# Patient Record
Sex: FEMALE | Race: WHITE | Employment: OTHER | URBAN - METROPOLITAN AREA
[De-identification: names, ages, dates, MRNs, and addresses within clinical notes are randomized per-mention and may not be internally consistent; named-entity substitution may affect disease eponyms.]

---

## 2018-08-20 ENCOUNTER — OP HISTORICAL/CONVERTED ENCOUNTER (OUTPATIENT)
Dept: OTHER | Age: 68
End: 2018-08-20

## 2018-09-04 ENCOUNTER — OP HISTORICAL/CONVERTED ENCOUNTER (OUTPATIENT)
Dept: OTHER | Age: 68
End: 2018-09-04

## 2018-10-02 ENCOUNTER — OP HISTORICAL/CONVERTED ENCOUNTER (OUTPATIENT)
Dept: OTHER | Age: 68
End: 2018-10-02

## 2018-11-01 ENCOUNTER — OP HISTORICAL/CONVERTED ENCOUNTER (OUTPATIENT)
Dept: OTHER | Age: 68
End: 2018-11-01

## 2018-12-04 ENCOUNTER — OP HISTORICAL/CONVERTED ENCOUNTER (OUTPATIENT)
Dept: OTHER | Age: 68
End: 2018-12-04

## 2019-01-03 ENCOUNTER — OP HISTORICAL/CONVERTED ENCOUNTER (OUTPATIENT)
Dept: OTHER | Age: 69
End: 2019-01-03

## 2019-02-05 ENCOUNTER — OP HISTORICAL/CONVERTED ENCOUNTER (OUTPATIENT)
Dept: OTHER | Age: 69
End: 2019-02-05

## 2019-03-01 ENCOUNTER — OP HISTORICAL/CONVERTED ENCOUNTER (OUTPATIENT)
Dept: OTHER | Age: 69
End: 2019-03-01

## 2019-05-20 ENCOUNTER — OP HISTORICAL/CONVERTED ENCOUNTER (OUTPATIENT)
Dept: OTHER | Age: 69
End: 2019-05-20

## 2019-05-27 ENCOUNTER — OP HISTORICAL/CONVERTED ENCOUNTER (OUTPATIENT)
Dept: OTHER | Age: 69
End: 2019-05-27

## 2020-01-01 ENCOUNTER — TELEPHONE (OUTPATIENT)
Dept: CARDIOLOGY CLINIC | Age: 70
End: 2020-01-01

## 2020-01-01 ENCOUNTER — TELEPHONE ANTICOAG (OUTPATIENT)
Dept: CARDIOLOGY CLINIC | Age: 70
End: 2020-01-01

## 2020-01-01 ENCOUNTER — HOSPITAL ENCOUNTER (INPATIENT)
Age: 70
LOS: 11 days | Discharge: HOME HEALTH | DRG: 673 | End: 2020-11-20
Attending: EMERGENCY MEDICINE | Admitting: INTERNAL MEDICINE
Payer: MEDICARE

## 2020-01-01 ENCOUNTER — HOSPITAL ENCOUNTER (OUTPATIENT)
Dept: INFUSION THERAPY | Age: 70
Discharge: HOME | End: 2020-11-03

## 2020-01-01 ENCOUNTER — OFFICE VISIT (OUTPATIENT)
Dept: CARDIOLOGY CLINIC | Age: 70
End: 2020-01-01
Payer: MEDICARE

## 2020-01-01 ENCOUNTER — HOSPITAL ENCOUNTER (OUTPATIENT)
Dept: INFUSION THERAPY | Age: 70
Discharge: HOME | End: 2020-10-27
Payer: MEDICARE

## 2020-01-01 ENCOUNTER — APPOINTMENT (OUTPATIENT)
Dept: GENERAL RADIOLOGY | Age: 70
DRG: 287 | End: 2020-01-01
Attending: NURSE PRACTITIONER
Payer: MEDICARE

## 2020-01-01 ENCOUNTER — APPOINTMENT (OUTPATIENT)
Dept: NON INVASIVE DIAGNOSTICS | Age: 70
DRG: 287 | End: 2020-01-01
Attending: NURSE PRACTITIONER
Payer: MEDICARE

## 2020-01-01 ENCOUNTER — APPOINTMENT (OUTPATIENT)
Dept: NUCLEAR MEDICINE | Age: 70
DRG: 287 | End: 2020-01-01
Attending: NURSE PRACTITIONER
Payer: MEDICARE

## 2020-01-01 ENCOUNTER — HOSPITAL ENCOUNTER (OUTPATIENT)
Dept: CT IMAGING | Age: 70
Discharge: HOME | DRG: 287 | End: 2020-10-27
Attending: NURSE PRACTITIONER
Payer: MEDICARE

## 2020-01-01 ENCOUNTER — TELEPHONE (OUTPATIENT)
Dept: PALLATIVE CARE | Age: 70
End: 2020-01-01

## 2020-01-01 ENCOUNTER — APPOINTMENT (OUTPATIENT)
Dept: GENERAL RADIOLOGY | Age: 70
End: 2020-01-01
Attending: EMERGENCY MEDICINE
Payer: MEDICARE

## 2020-01-01 ENCOUNTER — APPOINTMENT (OUTPATIENT)
Dept: GENERAL RADIOLOGY | Age: 70
DRG: 673 | End: 2020-01-01
Attending: EMERGENCY MEDICINE
Payer: MEDICARE

## 2020-01-01 ENCOUNTER — APPOINTMENT (OUTPATIENT)
Dept: CT IMAGING | Age: 70
DRG: 673 | End: 2020-01-01
Attending: NURSE PRACTITIONER
Payer: MEDICARE

## 2020-01-01 ENCOUNTER — HOSPITAL ENCOUNTER (EMERGENCY)
Age: 70
Discharge: HOME | End: 2020-11-09
Attending: EMERGENCY MEDICINE
Payer: MEDICARE

## 2020-01-01 ENCOUNTER — HOSPITAL ENCOUNTER (OUTPATIENT)
Dept: NON INVASIVE DIAGNOSTICS | Age: 70
Discharge: HOME | DRG: 287 | End: 2020-10-27
Attending: INTERNAL MEDICINE
Payer: MEDICARE

## 2020-01-01 ENCOUNTER — APPOINTMENT (OUTPATIENT)
Dept: GENERAL RADIOLOGY | Age: 70
DRG: 673 | End: 2020-01-01
Attending: INTERNAL MEDICINE
Payer: MEDICARE

## 2020-01-01 ENCOUNTER — APPOINTMENT (OUTPATIENT)
Dept: CT IMAGING | Age: 70
End: 2020-01-01
Attending: EMERGENCY MEDICINE
Payer: MEDICARE

## 2020-01-01 ENCOUNTER — HOSPITAL ENCOUNTER (INPATIENT)
Age: 70
LOS: 11 days | Discharge: HOME HEALTH | DRG: 287 | End: 2020-11-07
Attending: EMERGENCY MEDICINE | Admitting: INTERNAL MEDICINE
Payer: MEDICARE

## 2020-01-01 VITALS
TEMPERATURE: 98 F | WEIGHT: 286.6 LBS | HEART RATE: 70 BPM | HEIGHT: 67 IN | SYSTOLIC BLOOD PRESSURE: 90 MMHG | OXYGEN SATURATION: 95 % | DIASTOLIC BLOOD PRESSURE: 58 MMHG | RESPIRATION RATE: 18 BRPM | BODY MASS INDEX: 44.98 KG/M2

## 2020-01-01 VITALS
HEART RATE: 89 BPM | WEIGHT: 284 LBS | OXYGEN SATURATION: 97 % | BODY MASS INDEX: 52.26 KG/M2 | HEIGHT: 62 IN | TEMPERATURE: 98.3 F | SYSTOLIC BLOOD PRESSURE: 138 MMHG | RESPIRATION RATE: 24 BRPM

## 2020-01-01 VITALS
BODY MASS INDEX: 45.36 KG/M2 | HEART RATE: 68 BPM | RESPIRATION RATE: 24 BRPM | TEMPERATURE: 98.1 F | WEIGHT: 289 LBS | OXYGEN SATURATION: 98 % | SYSTOLIC BLOOD PRESSURE: 66 MMHG | HEIGHT: 67 IN

## 2020-01-01 VITALS
BODY MASS INDEX: 36.8 KG/M2 | OXYGEN SATURATION: 98 % | HEIGHT: 62 IN | SYSTOLIC BLOOD PRESSURE: 147 MMHG | HEART RATE: 83 BPM | WEIGHT: 200 LBS | DIASTOLIC BLOOD PRESSURE: 82 MMHG | RESPIRATION RATE: 22 BRPM | TEMPERATURE: 98.4 F

## 2020-01-01 VITALS
WEIGHT: 293 LBS | RESPIRATION RATE: 24 BRPM | BODY MASS INDEX: 45.99 KG/M2 | OXYGEN SATURATION: 97 % | SYSTOLIC BLOOD PRESSURE: 100 MMHG | HEART RATE: 71 BPM | HEIGHT: 67 IN | TEMPERATURE: 98.2 F

## 2020-01-01 VITALS
RESPIRATION RATE: 18 BRPM | HEIGHT: 62 IN | OXYGEN SATURATION: 98 % | BODY MASS INDEX: 51.93 KG/M2 | TEMPERATURE: 98.2 F | WEIGHT: 282.19 LBS | HEART RATE: 95 BPM

## 2020-01-01 VITALS — TEMPERATURE: 97.3 F | OXYGEN SATURATION: 99 % | RESPIRATION RATE: 18 BRPM | HEART RATE: 69 BPM

## 2020-01-01 VITALS — WEIGHT: 288.8 LBS | HEIGHT: 67 IN | BODY MASS INDEX: 45.33 KG/M2

## 2020-01-01 DIAGNOSIS — D50.0 BLOOD LOSS ANEMIA: ICD-10-CM

## 2020-01-01 DIAGNOSIS — R41.0 CONFUSION: ICD-10-CM

## 2020-01-01 DIAGNOSIS — E55.9 VITAMIN D DEFICIENCY: ICD-10-CM

## 2020-01-01 DIAGNOSIS — Z95.811 LVAD (LEFT VENTRICULAR ASSIST DEVICE) PRESENT (HCC): ICD-10-CM

## 2020-01-01 DIAGNOSIS — E11.8 DM (DIABETES MELLITUS) WITH COMPLICATIONS (HCC): ICD-10-CM

## 2020-01-01 DIAGNOSIS — E66.01 OBESITY, MORBID (HCC): ICD-10-CM

## 2020-01-01 DIAGNOSIS — G93.40 ACUTE ENCEPHALOPATHY: Primary | ICD-10-CM

## 2020-01-01 DIAGNOSIS — R06.02 SHORTNESS OF BREATH: ICD-10-CM

## 2020-01-01 DIAGNOSIS — I50.9 DECOMPENSATED HEART FAILURE (HCC): ICD-10-CM

## 2020-01-01 DIAGNOSIS — L97.521 ULCER OF LEFT FOOT, LIMITED TO BREAKDOWN OF SKIN (HCC): ICD-10-CM

## 2020-01-01 DIAGNOSIS — S81.802D OPEN WOUND OF LEFT LOWER LEG, SUBSEQUENT ENCOUNTER: ICD-10-CM

## 2020-01-01 DIAGNOSIS — S81.802A OPEN WOUND OF LEFT LOWER LEG, INITIAL ENCOUNTER: ICD-10-CM

## 2020-01-01 DIAGNOSIS — L03.115 CELLULITIS OF RIGHT LEG: Primary | ICD-10-CM

## 2020-01-01 DIAGNOSIS — Z91.89: ICD-10-CM

## 2020-01-01 DIAGNOSIS — R32 INCONTINENCE IN FEMALE: ICD-10-CM

## 2020-01-01 DIAGNOSIS — S21.101A STERNAL WOUND INFECTION: ICD-10-CM

## 2020-01-01 DIAGNOSIS — I25.10 CORONARY ARTERY DISEASE INVOLVING NATIVE CORONARY ARTERY OF NATIVE HEART WITHOUT ANGINA PECTORIS: ICD-10-CM

## 2020-01-01 DIAGNOSIS — Z99.89 OSA ON CPAP: ICD-10-CM

## 2020-01-01 DIAGNOSIS — Z51.81 ANTICOAGULATION MANAGEMENT ENCOUNTER: ICD-10-CM

## 2020-01-01 DIAGNOSIS — N39.498 OTHER URINARY INCONTINENCE: ICD-10-CM

## 2020-01-01 DIAGNOSIS — L03.90 CELLULITIS, UNSPECIFIED CELLULITIS SITE: ICD-10-CM

## 2020-01-01 DIAGNOSIS — N17.9 ACUTE RENAL FAILURE, UNSPECIFIED ACUTE RENAL FAILURE TYPE (HCC): ICD-10-CM

## 2020-01-01 DIAGNOSIS — T82.7XXA INFECTION AND INFLAMMATORY REACTION DUE TO CARDIAC DEVICE, IMPLANT, AND GRAFT, INITIAL ENCOUNTER (HCC): Primary | ICD-10-CM

## 2020-01-01 DIAGNOSIS — I47.29 NSVT (NONSUSTAINED VENTRICULAR TACHYCARDIA): ICD-10-CM

## 2020-01-01 DIAGNOSIS — Z76.89 ENCOUNTER TO ESTABLISH CARE: ICD-10-CM

## 2020-01-01 DIAGNOSIS — L08.9 STERNAL WOUND INFECTION: ICD-10-CM

## 2020-01-01 DIAGNOSIS — Z09 HOSPITAL DISCHARGE FOLLOW-UP: Primary | ICD-10-CM

## 2020-01-01 DIAGNOSIS — Z95.810 AICD (AUTOMATIC CARDIOVERTER/DEFIBRILLATOR) PRESENT: ICD-10-CM

## 2020-01-01 DIAGNOSIS — G47.33 OSA ON CPAP: ICD-10-CM

## 2020-01-01 DIAGNOSIS — Z79.01 CHRONIC ANTICOAGULATION: ICD-10-CM

## 2020-01-01 DIAGNOSIS — N17.9 AKI (ACUTE KIDNEY INJURY) (HCC): ICD-10-CM

## 2020-01-01 DIAGNOSIS — E03.9 ACQUIRED HYPOTHYROIDISM: ICD-10-CM

## 2020-01-01 DIAGNOSIS — E87.79 CARDIAC VOLUME OVERLOAD: ICD-10-CM

## 2020-01-01 DIAGNOSIS — S81.802A WOUND OF LEFT LOWER EXTREMITY, INITIAL ENCOUNTER: ICD-10-CM

## 2020-01-01 DIAGNOSIS — I50.9 CHRONIC CONGESTIVE HEART FAILURE, UNSPECIFIED HEART FAILURE TYPE (HCC): ICD-10-CM

## 2020-01-01 DIAGNOSIS — R60.0 LOWER EXTREMITY EDEMA: ICD-10-CM

## 2020-01-01 DIAGNOSIS — Z95.811 LVAD (LEFT VENTRICULAR ASSIST DEVICE) PRESENT (HCC): Primary | ICD-10-CM

## 2020-01-01 DIAGNOSIS — I87.2 CHRONIC VENOUS INSUFFICIENCY: ICD-10-CM

## 2020-01-01 DIAGNOSIS — D50.9 IRON DEFICIENCY ANEMIA, UNSPECIFIED IRON DEFICIENCY ANEMIA TYPE: ICD-10-CM

## 2020-01-01 DIAGNOSIS — S20.419A ABRASION OF BACK, UNSPECIFIED LATERALITY, INITIAL ENCOUNTER: Primary | ICD-10-CM
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
## 2020-01-01 DIAGNOSIS — Z79.01 CHRONIC ANTICOAGULATION: Primary | ICD-10-CM

## 2020-01-01 DIAGNOSIS — N18.32 CHRONIC RENAL FAILURE, STAGE 3B (HCC): ICD-10-CM

## 2020-01-01 DIAGNOSIS — I42.8 NICM (NONISCHEMIC CARDIOMYOPATHY) (HCC): ICD-10-CM

## 2020-01-01 DIAGNOSIS — E85.4 CARDIAC AMYLOIDOSIS (HCC): Primary | ICD-10-CM

## 2020-01-01 DIAGNOSIS — R54 FRAILTY SYNDROME IN GERIATRIC PATIENT: ICD-10-CM

## 2020-01-01 DIAGNOSIS — E88.09 HYPOALBUMINEMIA: ICD-10-CM

## 2020-01-01 DIAGNOSIS — D50.0 IRON DEFICIENCY ANEMIA DUE TO CHRONIC BLOOD LOSS: ICD-10-CM

## 2020-01-01 DIAGNOSIS — I42.9 CARDIOMYOPATHY, UNSPECIFIED TYPE (HCC): ICD-10-CM

## 2020-01-01 DIAGNOSIS — I50.23 ACUTE ON CHRONIC SYSTOLIC CHF (CONGESTIVE HEART FAILURE) (HCC): ICD-10-CM

## 2020-01-01 DIAGNOSIS — I43 CARDIAC AMYLOIDOSIS (HCC): Primary | ICD-10-CM

## 2020-01-01 DIAGNOSIS — Z45.02 ICD (IMPLANTABLE CARDIOVERTER-DEFIBRILLATOR) BATTERY DEPLETION: ICD-10-CM

## 2020-01-01 DIAGNOSIS — D68.9 COAGULOPATHY (HCC): ICD-10-CM

## 2020-01-01 DIAGNOSIS — I47.20 VT (VENTRICULAR TACHYCARDIA): ICD-10-CM

## 2020-01-01 DIAGNOSIS — R53.81 PHYSICAL DECONDITIONING: ICD-10-CM

## 2020-01-01 DIAGNOSIS — Z79.01 ANTICOAGULATION MANAGEMENT ENCOUNTER: ICD-10-CM

## 2020-01-01 DIAGNOSIS — Z71.89 ACP (ADVANCE CARE PLANNING): ICD-10-CM

## 2020-01-01 DIAGNOSIS — R06.02 SOB (SHORTNESS OF BREATH): ICD-10-CM

## 2020-01-01 DIAGNOSIS — T82.7XXA INFECTION AND INFLAMMATORY REACTION DUE TO CARDIAC DEVICE, IMPLANT, AND GRAFT, INITIAL ENCOUNTER (HCC): ICD-10-CM

## 2020-01-01 DIAGNOSIS — D50.8 OTHER IRON DEFICIENCY ANEMIA: ICD-10-CM

## 2020-01-01 DIAGNOSIS — G62.9 NEUROPATHY: ICD-10-CM

## 2020-01-01 LAB
25(OH)D3+25(OH)D2 SERPL-MCNC: 7.8 NG/ML (ref 30–100)
ABO + RH BLD: NORMAL
ALBUMIN SERPL ELPH-MCNC: 3.3 G/DL (ref 2.9–4.4)
ALBUMIN SERPL ELPH-MCNC: 3.4 G/DL (ref 2.9–4.4)
ALBUMIN SERPL-MCNC: 2.7 G/DL (ref 3.5–5)
ALBUMIN SERPL-MCNC: 2.8 G/DL (ref 3.5–5)
ALBUMIN SERPL-MCNC: 2.8 G/DL (ref 3.5–5)
ALBUMIN SERPL-MCNC: 2.9 G/DL (ref 3.5–5)
ALBUMIN SERPL-MCNC: 3 G/DL (ref 3.5–5)
ALBUMIN SERPL-MCNC: 3.1 G/DL (ref 3.5–5)
ALBUMIN SERPL-MCNC: 3.2 G/DL (ref 3.5–5)
ALBUMIN SERPL-MCNC: 3.3 G/DL (ref 3.5–5)
ALBUMIN SERPL-MCNC: 3.8 G/DL (ref 3.8–4.8)
ALBUMIN/GLOB SERPL: 0.6 {RATIO} (ref 1.1–2.2)
ALBUMIN/GLOB SERPL: 0.7 {RATIO} (ref 1.1–2.2)
ALBUMIN/GLOB SERPL: 0.8 {RATIO} (ref 1.1–2.2)
ALBUMIN/GLOB SERPL: 0.9 {RATIO} (ref 0.7–1.7)
ALBUMIN/GLOB SERPL: 1.1 {RATIO} (ref 0.7–1.7)
ALBUMIN/GLOB SERPL: 1.1 {RATIO} (ref 1.2–2.2)
ALP SERPL-CCNC: 61 U/L (ref 45–117)
ALP SERPL-CCNC: 65 U/L (ref 45–117)
ALP SERPL-CCNC: 66 U/L (ref 45–117)
ALP SERPL-CCNC: 67 U/L (ref 45–117)
ALP SERPL-CCNC: 69 U/L (ref 45–117)
ALP SERPL-CCNC: 69 U/L (ref 45–117)
ALP SERPL-CCNC: 70 U/L (ref 45–117)
ALP SERPL-CCNC: 71 U/L (ref 45–117)
ALP SERPL-CCNC: 73 U/L (ref 45–117)
ALP SERPL-CCNC: 74 U/L (ref 45–117)
ALP SERPL-CCNC: 74 U/L (ref 45–117)
ALP SERPL-CCNC: 76 U/L (ref 45–117)
ALP SERPL-CCNC: 77 U/L (ref 45–117)
ALP SERPL-CCNC: 77 U/L (ref 45–117)
ALP SERPL-CCNC: 79 U/L (ref 45–117)
ALP SERPL-CCNC: 81 U/L (ref 45–117)
ALP SERPL-CCNC: 83 U/L (ref 45–117)
ALP SERPL-CCNC: 84 U/L (ref 45–117)
ALP SERPL-CCNC: 85 U/L (ref 45–117)
ALP SERPL-CCNC: 85 U/L (ref 45–117)
ALP SERPL-CCNC: 89 U/L (ref 45–117)
ALP SERPL-CCNC: 89 U/L (ref 45–117)
ALP SERPL-CCNC: 93 U/L (ref 45–117)
ALP SERPL-CCNC: 93 U/L (ref 45–117)
ALP SERPL-CCNC: 97 IU/L (ref 39–117)
ALPHA1 GLOB SERPL ELPH-MCNC: 0.3 G/DL (ref 0–0.4)
ALPHA1 GLOB SERPL ELPH-MCNC: 0.3 G/DL (ref 0–0.4)
ALPHA2 GLOB SERPL ELPH-MCNC: 0.6 G/DL (ref 0.4–1)
ALPHA2 GLOB SERPL ELPH-MCNC: 0.9 G/DL (ref 0.4–1)
ALT SERPL-CCNC: 10 U/L (ref 12–78)
ALT SERPL-CCNC: 11 U/L (ref 12–78)
ALT SERPL-CCNC: 11 U/L (ref 12–78)
ALT SERPL-CCNC: 12 U/L (ref 12–78)
ALT SERPL-CCNC: 4 IU/L (ref 0–32)
ALT SERPL-CCNC: 7 U/L (ref 12–78)
ALT SERPL-CCNC: 8 U/L (ref 12–78)
ALT SERPL-CCNC: 9 U/L (ref 12–78)
AMMONIA PLAS-SCNC: 24 UMOL/L
ANION GAP SERPL CALC-SCNC: 11 MMOL/L (ref 5–15)
ANION GAP SERPL CALC-SCNC: 2 MMOL/L (ref 5–15)
ANION GAP SERPL CALC-SCNC: 4 MMOL/L (ref 5–15)
ANION GAP SERPL CALC-SCNC: 5 MMOL/L (ref 5–15)
ANION GAP SERPL CALC-SCNC: 5 MMOL/L (ref 5–15)
ANION GAP SERPL CALC-SCNC: 6 MMOL/L (ref 5–15)
ANION GAP SERPL CALC-SCNC: 7 MMOL/L (ref 5–15)
ANION GAP SERPL CALC-SCNC: 8 MMOL/L (ref 5–15)
ANION GAP SERPL CALC-SCNC: 8 MMOL/L (ref 5–15)
ANION GAP SERPL CALC-SCNC: 9 MMOL/L (ref 5–15)
ANTIGENS PRESENT BLD: NORMAL
ANTIGENS PRESENT RBC DONR: NORMAL
APPEARANCE UR: ABNORMAL
APTT PPP: 46.2 SEC (ref 22.1–32)
APTT PPP: 47.3 SEC (ref 22.1–32)
APTT PPP: 47.7 SEC (ref 22.1–32)
APTT PPP: 48.6 SEC (ref 22.1–32)
APTT PPP: 48.8 SEC (ref 22.1–32)
APTT PPP: 50.5 SEC (ref 22.1–32)
APTT PPP: 51.3 SEC (ref 22.1–32)
APTT PPP: 51.8 SEC (ref 22.1–32)
APTT PPP: 52.1 SEC (ref 22.1–32)
ARTERIAL PATENCY WRIST A: YES
AST SERPL W P-5'-P-CCNC: 13 U/L (ref 15–37)
AST SERPL-CCNC: 11 U/L (ref 15–37)
AST SERPL-CCNC: 13 IU/L (ref 0–40)
AST SERPL-CCNC: 13 U/L (ref 15–37)
AST SERPL-CCNC: 13 U/L (ref 15–37)
AST SERPL-CCNC: 14 U/L (ref 15–37)
AST SERPL-CCNC: 15 U/L (ref 15–37)
AST SERPL-CCNC: 16 U/L (ref 15–37)
AST SERPL-CCNC: 17 U/L (ref 15–37)
AST SERPL-CCNC: 18 U/L (ref 15–37)
AST SERPL-CCNC: 18 U/L (ref 15–37)
AST SERPL-CCNC: 19 U/L (ref 15–37)
AST SERPL-CCNC: 22 U/L (ref 15–37)
AST SERPL-CCNC: 28 U/L (ref 15–37)
AST SERPL-CCNC: 33 U/L (ref 15–37)
AST SERPL-CCNC: 37 U/L (ref 15–37)
AST SERPL-CCNC: 46 U/L (ref 15–37)
AST SERPL-CCNC: 56 U/L (ref 15–37)
ATRIAL RATE: 102 BPM
ATRIAL RATE: 300 BPM
ATRIAL RATE: 326 BPM
ATRIAL RATE: 42 BPM
ATRIAL RATE: 70 BPM
ATRIAL RATE: 78 BPM
ATRIAL RATE: 79 BPM
B-GLOBULIN SERPL ELPH-MCNC: 1 G/DL (ref 0.7–1.3)
B-GLOBULIN SERPL ELPH-MCNC: 1.1 G/DL (ref 0.7–1.3)
BACTERIA SPEC CULT: ABNORMAL
BACTERIA SPEC CULT: NORMAL
BACTERIA URNS QL MICRO: NEGATIVE /HPF
BASE EXCESS BLD CALC-SCNC: 3 MMOL/L
BASE EXCESS BLD CALC-SCNC: 4 MMOL/L
BASE EXCESS BLD CALC-SCNC: 5 MMOL/L
BASOPHILS # BLD: 0 K/UL (ref 0–0.1)
BASOPHILS NFR BLD: 0 % (ref 0–1)
BASOPHILS NFR BLD: 1 % (ref 0–1)
BASOPHILS NFR BLD: 1 % (ref 0–1)
BDY SITE: ABNORMAL
BILIRUB SERPL-MCNC: 0.2 MG/DL (ref 0.2–1)
BILIRUB SERPL-MCNC: 0.2 MG/DL (ref 0–1.2)
BILIRUB SERPL-MCNC: 0.3 MG/DL (ref 0.2–1)
BILIRUB SERPL-MCNC: 0.4 MG/DL (ref 0.2–1)
BILIRUB SERPL-MCNC: 0.5 MG/DL (ref 0.2–1)
BILIRUB UR QL: NEGATIVE
BLD PROD TYP BPU: NORMAL
BLOOD GROUP ANTIBODIES SERPL: NORMAL
BLOOD GROUP ANTIBODIES SERPL: NORMAL
BNP SERPL-MCNC: 1032 PG/ML
BNP SERPL-MCNC: 1432 PG/ML
BNP SERPL-MCNC: 1454 PG/ML
BNP SERPL-MCNC: 1604 PG/ML
BNP SERPL-MCNC: 1626 PG/ML
BNP SERPL-MCNC: 1749 PG/ML
BNP SERPL-MCNC: 2431 PG/ML
BNP SERPL-MCNC: 3727 PG/ML
BNP SERPL-MCNC: 3794 PG/ML
BNP SERPL-MCNC: 3961 PG/ML
BNP SERPL-MCNC: 5911 PG/ML
BNP SERPL-MCNC: 6184 PG/ML
BNP SERPL-MCNC: 6530 PG/ML
BNP SERPL-MCNC: 745 PG/ML
BNP SERPL-MCNC: 852 PG/ML
BNP SERPL-MCNC: 915 PG/ML
BNP SERPL-MCNC: 929 PG/ML
BNP SERPL-MCNC: 963 PG/ML
BNP SERPL-MCNC: 974 PG/ML
BNP SERPL-MCNC: ABNORMAL PG/ML
BPU ID: NORMAL
BUN SERPL-MCNC: 24 MG/DL (ref 6–20)
BUN SERPL-MCNC: 26 MG/DL (ref 6–20)
BUN SERPL-MCNC: 26 MG/DL (ref 6–20)
BUN SERPL-MCNC: 27 MG/DL (ref 8–27)
BUN SERPL-MCNC: 28 MG/DL (ref 6–20)
BUN SERPL-MCNC: 30 MG/DL (ref 6–20)
BUN SERPL-MCNC: 35 MG/DL (ref 6–20)
BUN SERPL-MCNC: 43 MG/DL (ref 6–20)
BUN SERPL-MCNC: 47 MG/DL (ref 6–20)
BUN SERPL-MCNC: 50 MG/DL (ref 6–20)
BUN SERPL-MCNC: 51 MG/DL (ref 6–20)
BUN SERPL-MCNC: 52 MG/DL (ref 6–20)
BUN SERPL-MCNC: 52 MG/DL (ref 6–20)
BUN SERPL-MCNC: 53 MG/DL (ref 6–20)
BUN SERPL-MCNC: 55 MG/DL (ref 6–20)
BUN SERPL-MCNC: 59 MG/DL (ref 6–20)
BUN SERPL-MCNC: 61 MG/DL (ref 6–20)
BUN SERPL-MCNC: 64 MG/DL (ref 6–20)
BUN SERPL-MCNC: 69 MG/DL (ref 6–20)
BUN SERPL-MCNC: 70 MG/DL (ref 6–20)
BUN SERPL-MCNC: 75 MG/DL (ref 6–20)
BUN SERPL-MCNC: 79 MG/DL (ref 6–20)
BUN SERPL-MCNC: 90 MG/DL (ref 6–20)
BUN SERPL-MCNC: 92 MG/DL (ref 6–20)
BUN SERPL-MCNC: 94 MG/DL (ref 6–20)
BUN SERPL-MCNC: 96 MG/DL (ref 6–20)
BUN SERPL-MCNC: 98 MG/DL (ref 6–20)
BUN/CREAT SERPL: 16 (ref 12–20)
BUN/CREAT SERPL: 16 (ref 12–20)
BUN/CREAT SERPL: 17 (ref 12–20)
BUN/CREAT SERPL: 17 (ref 12–20)
BUN/CREAT SERPL: 18 (ref 12–20)
BUN/CREAT SERPL: 19 (ref 12–20)
BUN/CREAT SERPL: 20 (ref 12–20)
BUN/CREAT SERPL: 20 (ref 12–28)
BUN/CREAT SERPL: 21 (ref 12–20)
BUN/CREAT SERPL: 21 (ref 12–20)
BUN/CREAT SERPL: 23 (ref 12–20)
BUN/CREAT SERPL: 23 (ref 12–20)
BUN/CREAT SERPL: 24 (ref 12–20)
BUN/CREAT SERPL: 25 (ref 12–20)
BUN/CREAT SERPL: 26 (ref 12–20)
BUN/CREAT SERPL: 27 (ref 12–20)
BUN/CREAT SERPL: 28 (ref 12–20)
BUN/CREAT SERPL: 29 (ref 12–20)
CA-I BLD-MCNC: 8.8 MG/DL (ref 8.5–10.1)
CA-I BLD-SCNC: 1.16 MMOL/L (ref 1.12–1.32)
CA-I BLD-SCNC: 1.18 MMOL/L (ref 1.12–1.32)
CA-I BLD-SCNC: 1.19 MMOL/L (ref 1.12–1.32)
CALCIUM SERPL-MCNC: 8 MG/DL (ref 8.5–10.1)
CALCIUM SERPL-MCNC: 8.4 MG/DL (ref 8.5–10.1)
CALCIUM SERPL-MCNC: 8.4 MG/DL (ref 8.5–10.1)
CALCIUM SERPL-MCNC: 8.5 MG/DL (ref 8.5–10.1)
CALCIUM SERPL-MCNC: 8.6 MG/DL (ref 8.5–10.1)
CALCIUM SERPL-MCNC: 8.7 MG/DL (ref 8.5–10.1)
CALCIUM SERPL-MCNC: 8.8 MG/DL (ref 8.5–10.1)
CALCIUM SERPL-MCNC: 8.9 MG/DL (ref 8.5–10.1)
CALCIUM SERPL-MCNC: 9 MG/DL (ref 8.5–10.1)
CALCIUM SERPL-MCNC: 9 MG/DL (ref 8.5–10.1)
CALCIUM SERPL-MCNC: 9 MG/DL (ref 8.7–10.3)
CALCIUM SERPL-MCNC: 9.1 MG/DL (ref 8.5–10.1)
CALCIUM SERPL-MCNC: 9.5 MG/DL (ref 8.5–10.1)
CALCULATED P AXIS, ECG09: 43 DEGREES
CALCULATED P AXIS, ECG09: 55 DEGREES
CALCULATED P AXIS, ECG09: 90 DEGREES
CALCULATED R AXIS, ECG10: -66 DEGREES
CALCULATED R AXIS, ECG10: -67 DEGREES
CALCULATED R AXIS, ECG10: -67 DEGREES
CALCULATED R AXIS, ECG10: -68 DEGREES
CALCULATED R AXIS, ECG10: -69 DEGREES
CALCULATED R AXIS, ECG10: 0 DEGREES
CALCULATED R AXIS, ECG10: 0 DEGREES
CALCULATED T AXIS, ECG11: -100 DEGREES
CALCULATED T AXIS, ECG11: -70 DEGREES
CALCULATED T AXIS, ECG11: 105 DEGREES
CALCULATED T AXIS, ECG11: 107 DEGREES
CALCULATED T AXIS, ECG11: 89 DEGREES
CALCULATED T AXIS, ECG11: 91 DEGREES
CALCULATED T AXIS, ECG11: 98 DEGREES
CHLORIDE SERPL-SCNC: 100 MMOL/L (ref 97–108)
CHLORIDE SERPL-SCNC: 101 MMOL/L (ref 97–108)
CHLORIDE SERPL-SCNC: 102 MMOL/L (ref 97–108)
CHLORIDE SERPL-SCNC: 103 MMOL/L (ref 97–108)
CHLORIDE SERPL-SCNC: 104 MMOL/L (ref 96–106)
CHLORIDE SERPL-SCNC: 105 MMOL/L (ref 97–108)
CHLORIDE SERPL-SCNC: 106 MMOL/L (ref 97–108)
CHLORIDE SERPL-SCNC: 106 MMOL/L (ref 97–108)
CHLORIDE SERPL-SCNC: 107 MMOL/L (ref 97–108)
CHLORIDE SERPL-SCNC: 108 MMOL/L (ref 97–108)
CHLORIDE SERPL-SCNC: 109 MMOL/L (ref 97–108)
CHLORIDE SERPL-SCNC: 109 MMOL/L (ref 97–108)
CHLORIDE SERPL-SCNC: 110 MMOL/L (ref 97–108)
CHLORIDE UR-SCNC: <10 MMOL/L
CHOLEST SERPL-MCNC: 192 MG/DL (ref 100–199)
CK SERPL-CCNC: 135 U/L (ref 26–192)
CK SERPL-CCNC: 56 U/L (ref 32–182)
CK SERPL-CCNC: 63 U/L (ref 26–192)
CK SERPL-CCNC: 70 U/L (ref 26–192)
CO2 SERPL-SCNC: 23 MMOL/L (ref 20–29)
CO2 SERPL-SCNC: 25 MMOL/L (ref 21–32)
CO2 SERPL-SCNC: 26 MMOL/L (ref 21–32)
CO2 SERPL-SCNC: 27 MMOL/L (ref 21–32)
CO2 SERPL-SCNC: 28 MMOL/L (ref 21–32)
CO2 SERPL-SCNC: 29 MMOL/L (ref 21–32)
CO2 SERPL-SCNC: 30 MMOL/L (ref 21–32)
CO2 SERPL-SCNC: 30 MMOL/L (ref 21–32)
CO2 SERPL-SCNC: 31 MMOL/L (ref 21–32)
CO2 SERPL-SCNC: 31 MMOL/L (ref 21–32)
CO2 SERPL-SCNC: 32 MMOL/L (ref 21–32)
COLOR UR: ABNORMAL
COMMENT, HOLDF: NORMAL
CREAT SERPL-MCNC: 1.32 MG/DL (ref 0.57–1)
CREAT SERPL-MCNC: 1.38 MG/DL (ref 0.55–1.02)
CREAT SERPL-MCNC: 1.59 MG/DL (ref 0.55–1.02)
CREAT SERPL-MCNC: 1.6 MG/DL (ref 0.55–1.02)
CREAT SERPL-MCNC: 1.64 MG/DL (ref 0.55–1.02)
CREAT SERPL-MCNC: 1.72 MG/DL (ref 0.55–1.02)
CREAT SERPL-MCNC: 1.82 MG/DL (ref 0.55–1.02)
CREAT SERPL-MCNC: 2.04 MG/DL (ref 0.55–1.02)
CREAT SERPL-MCNC: 2.13 MG/DL (ref 0.55–1.02)
CREAT SERPL-MCNC: 2.14 MG/DL (ref 0.55–1.02)
CREAT SERPL-MCNC: 2.23 MG/DL (ref 0.55–1.02)
CREAT SERPL-MCNC: 2.24 MG/DL (ref 0.55–1.02)
CREAT SERPL-MCNC: 2.5 MG/DL (ref 0.55–1.02)
CREAT SERPL-MCNC: 2.51 MG/DL (ref 0.55–1.02)
CREAT SERPL-MCNC: 2.52 MG/DL (ref 0.55–1.02)
CREAT SERPL-MCNC: 2.57 MG/DL (ref 0.55–1.02)
CREAT SERPL-MCNC: 2.58 MG/DL (ref 0.55–1.02)
CREAT SERPL-MCNC: 2.6 MG/DL (ref 0.55–1.02)
CREAT SERPL-MCNC: 2.67 MG/DL (ref 0.55–1.02)
CREAT SERPL-MCNC: 2.69 MG/DL (ref 0.55–1.02)
CREAT SERPL-MCNC: 3.08 MG/DL (ref 0.55–1.02)
CREAT SERPL-MCNC: 3.46 MG/DL (ref 0.55–1.02)
CREAT SERPL-MCNC: 3.88 MG/DL (ref 0.55–1.02)
CREAT SERPL-MCNC: 4.58 MG/DL (ref 0.55–1.02)
CREAT SERPL-MCNC: 4.68 MG/DL (ref 0.55–1.02)
CREAT SERPL-MCNC: 4.89 MG/DL (ref 0.55–1.02)
CREAT SERPL-MCNC: 4.96 MG/DL (ref 0.55–1.02)
CREAT UR-MCNC: 124 MG/DL
CROSSMATCH RESULT,%XM: NORMAL
DATE LAST DOSE: ABNORMAL
DIAGNOSIS, 93000: NORMAL
DIFFERENTIAL METHOD BLD: ABNORMAL
ECHO AO ROOT DIAM: 3.46 CM
ECHO AO ROOT DIAM: 3.5 CM
ECHO AR MAX VEL PISA: 315.7 CM/S
ECHO AR MAX VEL PISA: 320.51 CM/S
ECHO AV REGURGITANT PHT: 3617.65 MS
ECHO AV REGURGITANT PHT: 5751.99 MS
ECHO LA MAJOR AXIS: 3.87 CM
ECHO LA MINOR AXIS: 1.6 CM
ECHO LV INTERNAL DIMENSION DIASTOLIC: 6.19 CM (ref 3.9–5.3)
ECHO LV INTERNAL DIMENSION DIASTOLIC: 7.26 CM (ref 3.9–5.3)
ECHO LV INTERNAL DIMENSION SYSTOLIC: 5.81 CM
ECHO LV INTERNAL DIMENSION SYSTOLIC: 6.87 CM
ECHO LV IVSD: 0.79 CM (ref 0.6–0.9)
ECHO LV IVSD: 1.21 CM (ref 0.6–0.9)
ECHO LV MASS 2D: 180.2 G (ref 67–162)
ECHO LV MASS 2D: 397.8 G (ref 67–162)
ECHO LV MASS INDEX 2D: 168.3 G/M2 (ref 43–95)
ECHO LV MASS INDEX 2D: 74.5 G/M2 (ref 43–95)
ECHO LV POSTERIOR WALL DIASTOLIC: 0.7 CM (ref 0.6–0.9)
ECHO LV POSTERIOR WALL DIASTOLIC: 1.04 CM (ref 0.6–0.9)
ECHO PV PEAK INSTANTANEOUS GRADIENT SYSTOLIC: 7.06 MMHG
ECHO PV PEAK INSTANTANEOUS GRADIENT SYSTOLIC: 7.06 MMHG
ECHO RV INTERNAL DIMENSION: 2.82 CM
ECHO RV INTERNAL DIMENSION: 3.76 CM
ECHO RV TAPSE: 1.14 CM (ref 1.5–2)
ECHO RV TAPSE: 1.28 CM (ref 1.5–2)
ECHO TV REGURGITANT MAX VELOCITY: 231.34 CM/S
ECHO TV REGURGITANT PEAK GRADIENT: 21.41 MMHG
EOSINOPHIL # BLD: 0.1 K/UL (ref 0–0.4)
EOSINOPHIL # BLD: 0.2 K/UL (ref 0–0.4)
EOSINOPHIL # BLD: 0.3 K/UL (ref 0–0.4)
EOSINOPHIL # BLD: 0.5 K/UL (ref 0–0.4)
EOSINOPHIL # BLD: 0.5 K/UL (ref 0–0.4)
EOSINOPHIL #/AREA URNS HPF: NEGATIVE /[HPF]
EOSINOPHIL NFR BLD: 1 % (ref 0–7)
EOSINOPHIL NFR BLD: 10 % (ref 0–7)
EOSINOPHIL NFR BLD: 2 % (ref 0–7)
EOSINOPHIL NFR BLD: 2 % (ref 0–7)
EOSINOPHIL NFR BLD: 3 % (ref 0–7)
ERYTHROCYTE [DISTWIDTH] IN BLOOD BY AUTOMATED COUNT: 16.1 % (ref 11.7–15.4)
ERYTHROCYTE [DISTWIDTH] IN BLOOD BY AUTOMATED COUNT: 17.1 % (ref 11.5–14.5)
ERYTHROCYTE [DISTWIDTH] IN BLOOD BY AUTOMATED COUNT: 17.2 % (ref 11.5–14.5)
ERYTHROCYTE [DISTWIDTH] IN BLOOD BY AUTOMATED COUNT: 17.3 % (ref 11.5–14.5)
ERYTHROCYTE [DISTWIDTH] IN BLOOD BY AUTOMATED COUNT: 17.3 % (ref 11.5–14.5)
ERYTHROCYTE [DISTWIDTH] IN BLOOD BY AUTOMATED COUNT: 17.5 % (ref 11.5–14.5)
ERYTHROCYTE [DISTWIDTH] IN BLOOD BY AUTOMATED COUNT: 17.6 % (ref 11.5–14.5)
ERYTHROCYTE [DISTWIDTH] IN BLOOD BY AUTOMATED COUNT: 17.7 % (ref 11.5–14.5)
ERYTHROCYTE [DISTWIDTH] IN BLOOD BY AUTOMATED COUNT: 17.8 % (ref 11.5–14.5)
ERYTHROCYTE [DISTWIDTH] IN BLOOD BY AUTOMATED COUNT: 17.9 % (ref 11.5–14.5)
ERYTHROCYTE [DISTWIDTH] IN BLOOD BY AUTOMATED COUNT: 18.1 % (ref 11.5–14.5)
ERYTHROCYTE [DISTWIDTH] IN BLOOD BY AUTOMATED COUNT: 18.1 % (ref 11.5–14.5)
ERYTHROCYTE [DISTWIDTH] IN BLOOD BY AUTOMATED COUNT: 18.2 % (ref 11.5–14.5)
ERYTHROCYTE [DISTWIDTH] IN BLOOD BY AUTOMATED COUNT: 18.3 % (ref 11.5–14.5)
ERYTHROCYTE [DISTWIDTH] IN BLOOD BY AUTOMATED COUNT: 18.4 % (ref 11.5–14.5)
ERYTHROCYTE [DISTWIDTH] IN BLOOD BY AUTOMATED COUNT: 18.5 % (ref 11.5–14.5)
ERYTHROCYTE [SEDIMENTATION RATE] IN BLOOD BY WESTERGREN METHOD: 55 MM/HR (ref 0–40)
ERYTHROCYTE [SEDIMENTATION RATE] IN BLOOD: 59 MM/HR (ref 0–30)
EST. AVERAGE GLUCOSE BLD GHB EST-MCNC: 194 MG/DL
FERRITIN SERPL-MCNC: 411 NG/ML (ref 8–252)
FERRITIN SERPL-MCNC: 431 NG/ML (ref 8–252)
FERRITIN SERPL-MCNC: 66 NG/ML (ref 15–150)
FERRITIN SERPL-MCNC: 72 NG/ML (ref 8–252)
FERRITIN SERPL-MCNC: NORMAL NG/ML (ref 8–252)
FOLATE SERPL-MCNC: 10 NG/ML (ref 5–21)
GAMMA GLOB SERPL ELPH-MCNC: 1.4 G/DL (ref 0.4–1.8)
GAMMA GLOB SERPL ELPH-MCNC: 1.6 G/DL (ref 0.4–1.8)
GAS FLOW.O2 O2 DELIVERY SYS: ABNORMAL L/MIN
GAS FLOW.O2 SETTING OXYMISER: 2 L/M
GLOBULIN SER CALC-MCNC: 3.4 G/DL (ref 1.5–4.5)
GLOBULIN SER CALC-MCNC: 3.8 G/DL (ref 2–4)
GLOBULIN SER CALC-MCNC: 3.8 G/DL (ref 2–4)
GLOBULIN SER CALC-MCNC: 3.9 G/DL (ref 2–4)
GLOBULIN SER CALC-MCNC: 4 G/DL (ref 2–4)
GLOBULIN SER CALC-MCNC: 4 G/DL (ref 2–4)
GLOBULIN SER CALC-MCNC: 4.1 G/DL (ref 2–4)
GLOBULIN SER CALC-MCNC: 4.2 G/DL (ref 2–4)
GLOBULIN SER CALC-MCNC: 4.2 G/DL (ref 2–4)
GLOBULIN SER CALC-MCNC: 4.3 G/DL (ref 2–4)
GLOBULIN SER CALC-MCNC: 4.4 G/DL (ref 2–4)
GLOBULIN SER CALC-MCNC: 4.5 G/DL (ref 2–4)
GLOBULIN SER-MCNC: 3.3 G/DL (ref 2.2–3.9)
GLOBULIN SER-MCNC: 3.8 G/DL (ref 2.2–3.9)
GLUCOSE BLD STRIP.AUTO-MCNC: 104 MG/DL (ref 65–100)
GLUCOSE BLD STRIP.AUTO-MCNC: 106 MG/DL (ref 65–100)
GLUCOSE BLD STRIP.AUTO-MCNC: 119 MG/DL (ref 65–100)
GLUCOSE BLD STRIP.AUTO-MCNC: 125 MG/DL (ref 65–100)
GLUCOSE BLD STRIP.AUTO-MCNC: 126 MG/DL (ref 65–100)
GLUCOSE BLD STRIP.AUTO-MCNC: 126 MG/DL (ref 65–100)
GLUCOSE BLD STRIP.AUTO-MCNC: 129 MG/DL (ref 65–100)
GLUCOSE BLD STRIP.AUTO-MCNC: 130 MG/DL (ref 65–100)
GLUCOSE BLD STRIP.AUTO-MCNC: 131 MG/DL (ref 65–100)
GLUCOSE BLD STRIP.AUTO-MCNC: 132 MG/DL (ref 65–100)
GLUCOSE BLD STRIP.AUTO-MCNC: 134 MG/DL (ref 65–100)
GLUCOSE BLD STRIP.AUTO-MCNC: 134 MG/DL (ref 65–100)
GLUCOSE BLD STRIP.AUTO-MCNC: 136 MG/DL (ref 65–100)
GLUCOSE BLD STRIP.AUTO-MCNC: 137 MG/DL (ref 65–100)
GLUCOSE BLD STRIP.AUTO-MCNC: 139 MG/DL (ref 65–100)
GLUCOSE BLD STRIP.AUTO-MCNC: 140 MG/DL (ref 65–100)
GLUCOSE BLD STRIP.AUTO-MCNC: 141 MG/DL (ref 65–100)
GLUCOSE BLD STRIP.AUTO-MCNC: 141 MG/DL (ref 65–100)
GLUCOSE BLD STRIP.AUTO-MCNC: 143 MG/DL (ref 65–100)
GLUCOSE BLD STRIP.AUTO-MCNC: 144 MG/DL (ref 65–100)
GLUCOSE BLD STRIP.AUTO-MCNC: 145 MG/DL (ref 65–100)
GLUCOSE BLD STRIP.AUTO-MCNC: 147 MG/DL (ref 65–100)
GLUCOSE BLD STRIP.AUTO-MCNC: 149 MG/DL (ref 65–100)
GLUCOSE BLD STRIP.AUTO-MCNC: 151 MG/DL (ref 65–100)
GLUCOSE BLD STRIP.AUTO-MCNC: 151 MG/DL (ref 65–100)
GLUCOSE BLD STRIP.AUTO-MCNC: 152 MG/DL (ref 65–100)
GLUCOSE BLD STRIP.AUTO-MCNC: 152 MG/DL (ref 65–100)
GLUCOSE BLD STRIP.AUTO-MCNC: 154 MG/DL (ref 65–100)
GLUCOSE BLD STRIP.AUTO-MCNC: 156 MG/DL (ref 65–100)
GLUCOSE BLD STRIP.AUTO-MCNC: 156 MG/DL (ref 65–100)
GLUCOSE BLD STRIP.AUTO-MCNC: 158 MG/DL (ref 65–100)
GLUCOSE BLD STRIP.AUTO-MCNC: 158 MG/DL (ref 65–100)
GLUCOSE BLD STRIP.AUTO-MCNC: 163 MG/DL (ref 65–100)
GLUCOSE BLD STRIP.AUTO-MCNC: 164 MG/DL (ref 65–100)
GLUCOSE BLD STRIP.AUTO-MCNC: 165 MG/DL (ref 65–100)
GLUCOSE BLD STRIP.AUTO-MCNC: 166 MG/DL (ref 65–100)
GLUCOSE BLD STRIP.AUTO-MCNC: 167 MG/DL (ref 65–100)
GLUCOSE BLD STRIP.AUTO-MCNC: 167 MG/DL (ref 65–100)
GLUCOSE BLD STRIP.AUTO-MCNC: 169 MG/DL (ref 65–100)
GLUCOSE BLD STRIP.AUTO-MCNC: 170 MG/DL (ref 65–100)
GLUCOSE BLD STRIP.AUTO-MCNC: 171 MG/DL (ref 65–100)
GLUCOSE BLD STRIP.AUTO-MCNC: 172 MG/DL (ref 65–100)
GLUCOSE BLD STRIP.AUTO-MCNC: 174 MG/DL (ref 65–100)
GLUCOSE BLD STRIP.AUTO-MCNC: 176 MG/DL (ref 65–100)
GLUCOSE BLD STRIP.AUTO-MCNC: 176 MG/DL (ref 65–100)
GLUCOSE BLD STRIP.AUTO-MCNC: 177 MG/DL (ref 65–100)
GLUCOSE BLD STRIP.AUTO-MCNC: 178 MG/DL (ref 65–100)
GLUCOSE BLD STRIP.AUTO-MCNC: 180 MG/DL (ref 65–100)
GLUCOSE BLD STRIP.AUTO-MCNC: 180 MG/DL (ref 65–100)
GLUCOSE BLD STRIP.AUTO-MCNC: 182 MG/DL (ref 65–100)
GLUCOSE BLD STRIP.AUTO-MCNC: 184 MG/DL (ref 65–100)
GLUCOSE BLD STRIP.AUTO-MCNC: 185 MG/DL (ref 65–100)
GLUCOSE BLD STRIP.AUTO-MCNC: 186 MG/DL (ref 65–100)
GLUCOSE BLD STRIP.AUTO-MCNC: 188 MG/DL (ref 65–100)
GLUCOSE BLD STRIP.AUTO-MCNC: 189 MG/DL (ref 65–100)
GLUCOSE BLD STRIP.AUTO-MCNC: 190 MG/DL (ref 65–100)
GLUCOSE BLD STRIP.AUTO-MCNC: 191 MG/DL (ref 65–100)
GLUCOSE BLD STRIP.AUTO-MCNC: 192 MG/DL (ref 65–100)
GLUCOSE BLD STRIP.AUTO-MCNC: 194 MG/DL (ref 65–100)
GLUCOSE BLD STRIP.AUTO-MCNC: 195 MG/DL (ref 65–100)
GLUCOSE BLD STRIP.AUTO-MCNC: 196 MG/DL (ref 65–100)
GLUCOSE BLD STRIP.AUTO-MCNC: 198 MG/DL (ref 65–100)
GLUCOSE BLD STRIP.AUTO-MCNC: 199 MG/DL (ref 65–100)
GLUCOSE BLD STRIP.AUTO-MCNC: 200 MG/DL (ref 65–100)
GLUCOSE BLD STRIP.AUTO-MCNC: 203 MG/DL (ref 65–100)
GLUCOSE BLD STRIP.AUTO-MCNC: 204 MG/DL (ref 65–100)
GLUCOSE BLD STRIP.AUTO-MCNC: 208 MG/DL (ref 65–100)
GLUCOSE BLD STRIP.AUTO-MCNC: 211 MG/DL (ref 65–100)
GLUCOSE BLD STRIP.AUTO-MCNC: 215 MG/DL (ref 65–100)
GLUCOSE BLD STRIP.AUTO-MCNC: 226 MG/DL (ref 65–100)
GLUCOSE BLD STRIP.AUTO-MCNC: 238 MG/DL (ref 65–100)
GLUCOSE BLD STRIP.AUTO-MCNC: 261 MG/DL (ref 65–100)
GLUCOSE BLD STRIP.AUTO-MCNC: 83 MG/DL (ref 65–100)
GLUCOSE BLD STRIP.AUTO-MCNC: 94 MG/DL (ref 65–100)
GLUCOSE BLD STRIP.AUTO-MCNC: 97 MG/DL (ref 65–100)
GLUCOSE BLD STRIP.AUTO-MCNC: 98 MG/DL (ref 65–100)
GLUCOSE SERPL-MCNC: 108 MG/DL (ref 65–100)
GLUCOSE SERPL-MCNC: 113 MG/DL (ref 65–100)
GLUCOSE SERPL-MCNC: 118 MG/DL (ref 65–100)
GLUCOSE SERPL-MCNC: 121 MG/DL (ref 65–100)
GLUCOSE SERPL-MCNC: 121 MG/DL (ref 65–100)
GLUCOSE SERPL-MCNC: 123 MG/DL (ref 65–100)
GLUCOSE SERPL-MCNC: 127 MG/DL (ref 65–100)
GLUCOSE SERPL-MCNC: 128 MG/DL (ref 65–100)
GLUCOSE SERPL-MCNC: 130 MG/DL (ref 65–100)
GLUCOSE SERPL-MCNC: 131 MG/DL (ref 65–100)
GLUCOSE SERPL-MCNC: 148 MG/DL (ref 65–100)
GLUCOSE SERPL-MCNC: 152 MG/DL (ref 65–100)
GLUCOSE SERPL-MCNC: 164 MG/DL (ref 65–100)
GLUCOSE SERPL-MCNC: 169 MG/DL (ref 65–100)
GLUCOSE SERPL-MCNC: 170 MG/DL (ref 65–100)
GLUCOSE SERPL-MCNC: 171 MG/DL (ref 65–100)
GLUCOSE SERPL-MCNC: 172 MG/DL (ref 65–100)
GLUCOSE SERPL-MCNC: 174 MG/DL (ref 65–100)
GLUCOSE SERPL-MCNC: 182 MG/DL (ref 65–99)
GLUCOSE SERPL-MCNC: 185 MG/DL (ref 65–100)
GLUCOSE SERPL-MCNC: 189 MG/DL (ref 65–100)
GLUCOSE SERPL-MCNC: 192 MG/DL (ref 65–100)
GLUCOSE SERPL-MCNC: 192 MG/DL (ref 65–100)
GLUCOSE SERPL-MCNC: 213 MG/DL (ref 65–100)
GLUCOSE SERPL-MCNC: 245 MG/DL (ref 65–100)
GLUCOSE SERPL-MCNC: 80 MG/DL (ref 65–100)
GLUCOSE SERPL-MCNC: 95 MG/DL (ref 65–100)
GLUCOSE UR STRIP.AUTO-MCNC: NEGATIVE MG/DL
GRAM STN SPEC: ABNORMAL
HAPTOGLOB SERPL-MCNC: 180 MG/DL (ref 30–200)
HBA1C MFR BLD: 8.4 % (ref 4–5.6)
HCO3 BLD-SCNC: 28.6 MMOL/L (ref 22–26)
HCO3 BLD-SCNC: 29.7 MMOL/L (ref 22–26)
HCO3 BLD-SCNC: 30.5 MMOL/L (ref 22–26)
HCT VFR BLD AUTO: 21 % (ref 35–47)
HCT VFR BLD AUTO: 22.6 % (ref 35–47)
HCT VFR BLD AUTO: 22.7 % (ref 35–47)
HCT VFR BLD AUTO: 23.1 % (ref 35–47)
HCT VFR BLD AUTO: 23.9 % (ref 35–47)
HCT VFR BLD AUTO: 24.3 % (ref 35–47)
HCT VFR BLD AUTO: 25.1 % (ref 35–47)
HCT VFR BLD AUTO: 25.4 % (ref 35–47)
HCT VFR BLD AUTO: 26.4 % (ref 35–47)
HCT VFR BLD AUTO: 26.4 % (ref 35–47)
HCT VFR BLD AUTO: 27.4 % (ref 35–47)
HCT VFR BLD AUTO: 27.7 % (ref 35–47)
HCT VFR BLD AUTO: 27.9 % (ref 35–47)
HCT VFR BLD AUTO: 28 % (ref 35–47)
HCT VFR BLD AUTO: 28.4 % (ref 35–47)
HCT VFR BLD AUTO: 29.7 % (ref 34–46.6)
HCT VFR BLD AUTO: 30.2 % (ref 35–47)
HCT VFR BLD AUTO: 30.8 % (ref 35–47)
HCT VFR BLD AUTO: 31.5 % (ref 35–47)
HCT VFR BLD AUTO: 31.5 % (ref 35–47)
HCT VFR BLD AUTO: 31.8 % (ref 35–47)
HCT VFR BLD AUTO: 31.8 % (ref 35–47)
HCT VFR BLD AUTO: 32 % (ref 35–47)
HCT VFR BLD AUTO: 32.1 % (ref 35–47)
HCT VFR BLD AUTO: 32.4 % (ref 35–47)
HCT VFR BLD AUTO: 32.5 % (ref 35–47)
HCT VFR BLD AUTO: 32.6 % (ref 35–47)
HCV AB S/CO SERPL IA: <0.1 S/CO RATIO (ref 0–0.9)
HCV AB SERPL QL IA: NORMAL
HDLC SERPL-MCNC: 29 MG/DL
HGB BLD-MCNC: 6.3 G/DL (ref 11.5–16)
HGB BLD-MCNC: 6.9 G/DL (ref 11.5–16)
HGB BLD-MCNC: 7 G/DL (ref 11.5–16)
HGB BLD-MCNC: 7 G/DL (ref 11.5–16)
HGB BLD-MCNC: 7.4 G/DL (ref 11.5–16)
HGB BLD-MCNC: 7.4 G/DL (ref 11.5–16)
HGB BLD-MCNC: 7.8 G/DL (ref 11.5–16)
HGB BLD-MCNC: 7.9 G/DL (ref 11.5–16)
HGB BLD-MCNC: 8.1 G/DL (ref 11.5–16)
HGB BLD-MCNC: 8.1 G/DL (ref 11.5–16)
HGB BLD-MCNC: 8.2 G/DL (ref 11.5–16)
HGB BLD-MCNC: 8.3 G/DL (ref 11.5–16)
HGB BLD-MCNC: 8.3 G/DL (ref 11.5–16)
HGB BLD-MCNC: 8.4 G/DL (ref 11.5–16)
HGB BLD-MCNC: 8.6 G/DL (ref 11.5–16)
HGB BLD-MCNC: 8.8 G/DL (ref 11.5–16)
HGB BLD-MCNC: 9.2 G/DL (ref 11.5–16)
HGB BLD-MCNC: 9.3 G/DL (ref 11.1–15.9)
HGB BLD-MCNC: 9.3 G/DL (ref 11.5–16)
HGB BLD-MCNC: 9.3 G/DL (ref 11.5–16)
HGB BLD-MCNC: 9.4 G/DL (ref 11.5–16)
HGB BLD-MCNC: 9.4 G/DL (ref 11.5–16)
HGB BLD-MCNC: 9.5 G/DL (ref 11.5–16)
HGB BLD-MCNC: 9.7 G/DL (ref 11.5–16)
HGB BLD-MCNC: 9.8 G/DL (ref 11.5–16)
HGB FREE PLAS-MCNC: 4 MG/DL (ref 0–4.9)
HGB UR QL STRIP: ABNORMAL
HISTORY CHECKED?,CKHIST: NORMAL
IGA SERPL-MCNC: 242 MG/DL (ref 87–352)
IGA SERPL-MCNC: 265 MG/DL (ref 87–352)
IGG SERPL-MCNC: 1486 MG/DL (ref 586–1602)
IGG SERPL-MCNC: 1716 MG/DL (ref 586–1602)
IGM SERPL-MCNC: 115 MG/DL (ref 26–217)
IGM SERPL-MCNC: 138 MG/DL (ref 26–217)
IMM GRANULOCYTES # BLD AUTO: 0 K/UL (ref 0–0.04)
IMM GRANULOCYTES # BLD AUTO: 0.1 K/UL (ref 0–0.04)
IMM GRANULOCYTES # BLD AUTO: 0.2 K/UL (ref 0–0.04)
IMM GRANULOCYTES NFR BLD AUTO: 0 % (ref 0–0.5)
IMM GRANULOCYTES NFR BLD AUTO: 0 % (ref 0–0.5)
IMM GRANULOCYTES NFR BLD AUTO: 1 % (ref 0–0.5)
IMM GRANULOCYTES NFR BLD AUTO: 2 % (ref 0–0.5)
INR PPP: 2 (ref 0.9–1.1)
INR PPP: 2.1 (ref 0.8–1.2)
INR PPP: 2.1 (ref 0.9–1.1)
INR PPP: 2.2 (ref 0.9–1.1)
INR PPP: 2.3 (ref 0.9–1.1)
INR PPP: 2.4 (ref 0.9–1.1)
INR PPP: 2.6 (ref 0.9–1.1)
INR PPP: 2.7 (ref 0.9–1.1)
INR PPP: 3 (ref 0.9–1.1)
INR, EXTERNAL: 1.3
INR, EXTERNAL: 1.3
INR, EXTERNAL: 1.4
INR, EXTERNAL: 1.5
INR, EXTERNAL: 1.9
INR, EXTERNAL: 2.1
INR, EXTERNAL: 2.2
INR, EXTERNAL: 2.3
INR, EXTERNAL: 2.4
INR, EXTERNAL: 2.8
INR, EXTERNAL: 2.9
INR, EXTERNAL: 3.6
INR, EXTERNAL: 3.7
INR, EXTERNAL: 3.9
INR, EXTERNAL: 4.2
INR, EXTERNAL: 4.9
INTERPRETATION SERPL IEP-IMP: ABNORMAL
INTERPRETATION SERPL IEP-IMP: ABNORMAL
IRON SATN MFR SERPL: 13 % (ref 20–50)
IRON SATN MFR SERPL: 20 % (ref 20–50)
IRON SATN MFR SERPL: 45 % (ref 20–50)
IRON SATN MFR SERPL: 63 % (ref 20–50)
IRON SATN MFR SERPL: 9 % (ref 15–55)
IRON SATN MFR SERPL: 98 % (ref 20–50)
IRON SERPL-MCNC: 134 UG/DL (ref 35–150)
IRON SERPL-MCNC: 219 UG/DL (ref 35–150)
IRON SERPL-MCNC: 25 UG/DL (ref 35–150)
IRON SERPL-MCNC: 27 UG/DL (ref 27–139)
IRON SERPL-MCNC: 341 UG/DL (ref 35–150)
IRON SERPL-MCNC: 353 UG/DL (ref 35–150)
IRON SERPL-MCNC: 47 UG/DL (ref 35–150)
KAPPA LC FREE SER-MCNC: 118.2 MG/L (ref 3.3–19.4)
KAPPA LC FREE SER-MCNC: 85.5 MG/L (ref 3.3–19.4)
KAPPA LC FREE/LAMBDA FREE SER: 1.42 {RATIO} (ref 0.26–1.65)
KAPPA LC FREE/LAMBDA FREE SER: 1.58 {RATIO} (ref 0.26–1.65)
KETONES UR QL STRIP.AUTO: NEGATIVE MG/DL
LACTATE SERPL-SCNC: 0.3 MMOL/L (ref 0.4–2)
LACTATE SERPL-SCNC: 0.4 MMOL/L (ref 0.4–2)
LACTATE SERPL-SCNC: 0.5 MMOL/L (ref 0.4–2)
LACTATE SERPL-SCNC: 0.6 MMOL/L (ref 0.4–2)
LACTATE SERPL-SCNC: 0.7 MMOL/L (ref 0.4–2)
LACTATE SERPL-SCNC: 0.7 MMOL/L (ref 0.4–2)
LACTATE SERPL-SCNC: 1 MMOL/L (ref 0.4–2)
LAMBDA LC FREE SERPL-MCNC: 54.2 MG/L (ref 5.7–26.3)
LAMBDA LC FREE SERPL-MCNC: 83.3 MG/L (ref 5.7–26.3)
LDH SERPL L TO P-CCNC: 191 U/L (ref 81–246)
LDH SERPL L TO P-CCNC: 201 U/L (ref 81–246)
LDH SERPL L TO P-CCNC: 205 U/L (ref 81–246)
LDH SERPL L TO P-CCNC: 217 U/L (ref 81–246)
LDH SERPL L TO P-CCNC: 219 U/L (ref 81–246)
LDH SERPL L TO P-CCNC: 226 U/L (ref 81–246)
LDH SERPL L TO P-CCNC: 240 U/L (ref 81–246)
LDH SERPL L TO P-CCNC: 246 U/L (ref 81–246)
LDH SERPL L TO P-CCNC: 266 U/L (ref 81–246)
LDH SERPL L TO P-CCNC: 298 U/L (ref 81–246)
LDH SERPL L TO P-CCNC: 309 U/L (ref 81–246)
LDH SERPL L TO P-CCNC: 312 U/L (ref 81–246)
LDH SERPL L TO P-CCNC: 316 U/L (ref 81–246)
LDH SERPL L TO P-CCNC: 319 U/L (ref 81–246)
LDH SERPL L TO P-CCNC: 321 U/L (ref 81–246)
LDH SERPL L TO P-CCNC: 324 U/L (ref 81–246)
LDH SERPL L TO P-CCNC: 329 U/L (ref 81–246)
LDH SERPL L TO P-CCNC: 330 U/L (ref 81–246)
LDH SERPL L TO P-CCNC: 391 U/L (ref 81–246)
LDH SERPL L TO P-CCNC: 517 U/L (ref 81–246)
LDH SERPL-CCNC: 227 IU/L (ref 119–226)
LDLC SERPL CALC-MCNC: 123 MG/DL (ref 0–99)
LEUKOCYTE ESTERASE UR QL STRIP.AUTO: ABNORMAL
LYMPHOCYTES # BLD: 0.8 K/UL (ref 0.8–3.5)
LYMPHOCYTES # BLD: 1.1 K/UL (ref 0.8–3.5)
LYMPHOCYTES # BLD: 1.2 K/UL (ref 0.8–3.5)
LYMPHOCYTES # BLD: 1.3 K/UL (ref 0.8–3.5)
LYMPHOCYTES # BLD: 1.4 K/UL (ref 0.8–3.5)
LYMPHOCYTES NFR BLD: 10 % (ref 12–49)
LYMPHOCYTES NFR BLD: 11 % (ref 12–49)
LYMPHOCYTES NFR BLD: 11 % (ref 12–49)
LYMPHOCYTES NFR BLD: 15 % (ref 12–49)
LYMPHOCYTES NFR BLD: 16 % (ref 12–49)
LYMPHOCYTES NFR BLD: 17 % (ref 12–49)
LYMPHOCYTES NFR BLD: 8 % (ref 12–49)
M PROTEIN SERPL ELPH-MCNC: ABNORMAL G/DL
M PROTEIN SERPL ELPH-MCNC: ABNORMAL G/DL
MAGNESIUM SERPL-MCNC: 1.8 MG/DL (ref 1.6–2.4)
MAGNESIUM SERPL-MCNC: 1.9 MG/DL (ref 1.6–2.4)
MAGNESIUM SERPL-MCNC: 1.9 MG/DL (ref 1.6–2.4)
MAGNESIUM SERPL-MCNC: 2 MG/DL (ref 1.6–2.4)
MAGNESIUM SERPL-MCNC: 2.1 MG/DL (ref 1.6–2.4)
MAGNESIUM SERPL-MCNC: 2.1 MG/DL (ref 1.6–2.4)
MAGNESIUM SERPL-MCNC: 2.2 MG/DL (ref 1.6–2.4)
MAGNESIUM SERPL-MCNC: 2.2 MG/DL (ref 1.6–2.4)
MAGNESIUM SERPL-MCNC: 2.3 MG/DL (ref 1.6–2.4)
MAGNESIUM SERPL-MCNC: 2.4 MG/DL (ref 1.6–2.4)
MAGNESIUM SERPL-MCNC: 2.4 MG/DL (ref 1.6–2.4)
MAGNESIUM SERPL-MCNC: 2.5 MG/DL (ref 1.6–2.4)
MAGNESIUM SERPL-MCNC: 2.6 MG/DL (ref 1.6–2.4)
MAGNESIUM SERPL-MCNC: 2.6 MG/DL (ref 1.6–2.4)
MAGNESIUM SERPL-MCNC: 2.7 MG/DL (ref 1.6–2.4)
MAGNESIUM SERPL-MCNC: 2.9 MG/DL (ref 1.6–2.4)
MAGNESIUM SERPL-MCNC: 3.3 MG/DL (ref 1.6–2.4)
MCH RBC QN AUTO: 24.6 PG (ref 26–34)
MCH RBC QN AUTO: 24.8 PG (ref 26.6–33)
MCH RBC QN AUTO: 24.9 PG (ref 26–34)
MCH RBC QN AUTO: 25.1 PG (ref 26–34)
MCH RBC QN AUTO: 25.2 PG (ref 26–34)
MCH RBC QN AUTO: 25.2 PG (ref 26–34)
MCH RBC QN AUTO: 25.3 PG (ref 26–34)
MCH RBC QN AUTO: 25.3 PG (ref 26–34)
MCH RBC QN AUTO: 25.4 PG (ref 26–34)
MCH RBC QN AUTO: 25.5 PG (ref 26–34)
MCH RBC QN AUTO: 25.5 PG (ref 26–34)
MCH RBC QN AUTO: 25.6 PG (ref 26–34)
MCH RBC QN AUTO: 25.7 PG (ref 26–34)
MCH RBC QN AUTO: 25.7 PG (ref 26–34)
MCH RBC QN AUTO: 25.8 PG (ref 26–34)
MCH RBC QN AUTO: 26.5 PG (ref 26–34)
MCH RBC QN AUTO: 27.5 PG (ref 26–34)
MCH RBC QN AUTO: 27.6 PG (ref 26–34)
MCH RBC QN AUTO: 27.8 PG (ref 26–34)
MCH RBC QN AUTO: 27.9 PG (ref 26–34)
MCH RBC QN AUTO: 27.9 PG (ref 26–34)
MCH RBC QN AUTO: 28 PG (ref 26–34)
MCH RBC QN AUTO: 28.6 PG (ref 26–34)
MCH RBC QN AUTO: 28.6 PG (ref 26–34)
MCHC RBC AUTO-ENTMCNC: 29 G/DL (ref 30–36.5)
MCHC RBC AUTO-ENTMCNC: 29.1 G/DL (ref 30–36.5)
MCHC RBC AUTO-ENTMCNC: 29.2 G/DL (ref 30–36.5)
MCHC RBC AUTO-ENTMCNC: 29.2 G/DL (ref 30–36.5)
MCHC RBC AUTO-ENTMCNC: 29.5 G/DL (ref 30–36.5)
MCHC RBC AUTO-ENTMCNC: 29.6 G/DL (ref 30–36.5)
MCHC RBC AUTO-ENTMCNC: 29.6 G/DL (ref 30–36.5)
MCHC RBC AUTO-ENTMCNC: 29.7 G/DL (ref 30–36.5)
MCHC RBC AUTO-ENTMCNC: 29.8 G/DL (ref 30–36.5)
MCHC RBC AUTO-ENTMCNC: 29.9 G/DL (ref 30–36.5)
MCHC RBC AUTO-ENTMCNC: 30 G/DL (ref 30–36.5)
MCHC RBC AUTO-ENTMCNC: 30.1 G/DL (ref 30–36.5)
MCHC RBC AUTO-ENTMCNC: 30.3 G/DL (ref 30–36.5)
MCHC RBC AUTO-ENTMCNC: 30.3 G/DL (ref 30–36.5)
MCHC RBC AUTO-ENTMCNC: 30.5 G/DL (ref 30–36.5)
MCHC RBC AUTO-ENTMCNC: 30.7 G/DL (ref 30–36.5)
MCHC RBC AUTO-ENTMCNC: 30.8 G/DL (ref 30–36.5)
MCHC RBC AUTO-ENTMCNC: 31 G/DL (ref 30–36.5)
MCHC RBC AUTO-ENTMCNC: 31.1 G/DL (ref 30–36.5)
MCHC RBC AUTO-ENTMCNC: 31.3 G/DL (ref 31.5–35.7)
MCV RBC AUTO: 79 FL (ref 79–97)
MCV RBC AUTO: 83.6 FL (ref 80–99)
MCV RBC AUTO: 84.6 FL (ref 80–99)
MCV RBC AUTO: 84.8 FL (ref 80–99)
MCV RBC AUTO: 84.9 FL (ref 80–99)
MCV RBC AUTO: 85.1 FL (ref 80–99)
MCV RBC AUTO: 85.3 FL (ref 80–99)
MCV RBC AUTO: 85.8 FL (ref 80–99)
MCV RBC AUTO: 86 FL (ref 80–99)
MCV RBC AUTO: 86.1 FL (ref 80–99)
MCV RBC AUTO: 86.1 FL (ref 80–99)
MCV RBC AUTO: 86.2 FL (ref 80–99)
MCV RBC AUTO: 86.4 FL (ref 80–99)
MCV RBC AUTO: 86.4 FL (ref 80–99)
MCV RBC AUTO: 86.5 FL (ref 80–99)
MCV RBC AUTO: 87.2 FL (ref 80–99)
MCV RBC AUTO: 89.2 FL (ref 80–99)
MCV RBC AUTO: 89.6 FL (ref 80–99)
MCV RBC AUTO: 90.7 FL (ref 80–99)
MCV RBC AUTO: 91 FL (ref 80–99)
MCV RBC AUTO: 91 FL (ref 80–99)
MCV RBC AUTO: 91.5 FL (ref 80–99)
MCV RBC AUTO: 91.9 FL (ref 80–99)
MCV RBC AUTO: 92 FL (ref 80–99)
MCV RBC AUTO: 92 FL (ref 80–99)
MCV RBC AUTO: 93.6 FL (ref 80–99)
MONOCYTES # BLD: 0.4 K/UL (ref 0–1)
MONOCYTES # BLD: 0.6 K/UL (ref 0–1)
MONOCYTES # BLD: 0.7 K/UL (ref 0–1)
MONOCYTES # BLD: 0.8 K/UL (ref 0–1)
MONOCYTES # BLD: 0.9 K/UL (ref 0–1)
MONOCYTES NFR BLD: 5 % (ref 5–13)
MONOCYTES NFR BLD: 6 % (ref 5–13)
MONOCYTES NFR BLD: 7 % (ref 5–13)
MONOCYTES NFR BLD: 8 % (ref 5–13)
MONOCYTES NFR BLD: 8 % (ref 5–13)
NEUTS SEG # BLD: 11.8 K/UL (ref 1.8–8)
NEUTS SEG # BLD: 3.3 K/UL (ref 1.8–8)
NEUTS SEG # BLD: 5.3 K/UL (ref 1.8–8)
NEUTS SEG # BLD: 6.3 K/UL (ref 1.8–8)
NEUTS SEG # BLD: 8 K/UL (ref 1.8–8)
NEUTS SEG # BLD: 8.9 K/UL (ref 1.8–8)
NEUTS SEG # BLD: 9.7 K/UL (ref 1.8–8)
NEUTS SEG NFR BLD: 64 % (ref 32–75)
NEUTS SEG NFR BLD: 73 % (ref 32–75)
NEUTS SEG NFR BLD: 77 % (ref 32–75)
NEUTS SEG NFR BLD: 78 % (ref 32–75)
NEUTS SEG NFR BLD: 79 % (ref 32–75)
NEUTS SEG NFR BLD: 82 % (ref 32–75)
NEUTS SEG NFR BLD: 83 % (ref 32–75)
NITRITE UR QL STRIP.AUTO: NEGATIVE
NRBC # BLD: 0 K/UL (ref 0–0.01)
NRBC BLD-RTO: 0 PER 100 WBC
NT-PROBNP SERPL-MCNC: 3207 PG/ML (ref 0–301)
P-R INTERVAL, ECG05: 136 MS
P-R INTERVAL, ECG05: 202 MS
P-R INTERVAL, ECG05: 48 MS
P-R INTERVAL, ECG05: 80 MS
PCO2 BLD: 48.7 MMHG (ref 35–45)
PCO2 BLD: 49.8 MMHG (ref 35–45)
PCO2 BLD: 61 MMHG (ref 35–45)
PH BLD: 7.31 [PH] (ref 7.35–7.45)
PH BLD: 7.38 [PH] (ref 7.35–7.45)
PH BLD: 7.38 [PH] (ref 7.35–7.45)
PH UR STRIP: 5 [PH] (ref 5–8)
PHOSPHATE SERPL-MCNC: 2.5 MG/DL (ref 2.6–4.7)
PHOSPHATE SERPL-MCNC: 3.4 MG/DL (ref 2.6–4.7)
PHOSPHATE SERPL-MCNC: 4.6 MG/DL (ref 2.6–4.7)
PHOSPHATE SERPL-MCNC: 5.2 MG/DL (ref 2.6–4.7)
PLATELET # BLD AUTO: 123 K/UL (ref 150–400)
PLATELET # BLD AUTO: 136 K/UL (ref 150–400)
PLATELET # BLD AUTO: 140 K/UL (ref 150–400)
PLATELET # BLD AUTO: 144 K/UL (ref 150–400)
PLATELET # BLD AUTO: 149 K/UL (ref 150–400)
PLATELET # BLD AUTO: 157 K/UL (ref 150–400)
PLATELET # BLD AUTO: 162 K/UL (ref 150–400)
PLATELET # BLD AUTO: 163 K/UL (ref 150–400)
PLATELET # BLD AUTO: 164 K/UL (ref 150–400)
PLATELET # BLD AUTO: 167 K/UL (ref 150–400)
PLATELET # BLD AUTO: 170 K/UL (ref 150–400)
PLATELET # BLD AUTO: 172 K/UL (ref 150–400)
PLATELET # BLD AUTO: 173 K/UL (ref 150–400)
PLATELET # BLD AUTO: 174 K/UL (ref 150–400)
PLATELET # BLD AUTO: 178 K/UL (ref 150–400)
PLATELET # BLD AUTO: 183 K/UL (ref 150–400)
PLATELET # BLD AUTO: 183 K/UL (ref 150–400)
PLATELET # BLD AUTO: 185 K/UL (ref 150–400)
PLATELET # BLD AUTO: 189 K/UL (ref 150–400)
PLATELET # BLD AUTO: 194 K/UL (ref 150–400)
PLATELET # BLD AUTO: 196 K/UL (ref 150–400)
PLATELET # BLD AUTO: 202 K/UL (ref 150–400)
PLATELET # BLD AUTO: 202 K/UL (ref 150–400)
PLATELET # BLD AUTO: 218 K/UL (ref 150–400)
PLATELET # BLD AUTO: 225 X10E3/UL (ref 150–450)
PLATELET # BLD AUTO: 248 K/UL (ref 150–400)
PLEASE NOTE:, 149534: ABNORMAL
PMV BLD AUTO: 10 FL (ref 8.9–12.9)
PMV BLD AUTO: 10.1 FL (ref 8.9–12.9)
PMV BLD AUTO: 10.2 FL (ref 8.9–12.9)
PMV BLD AUTO: 10.3 FL (ref 8.9–12.9)
PMV BLD AUTO: 10.4 FL (ref 8.9–12.9)
PMV BLD AUTO: 10.4 FL (ref 8.9–12.9)
PMV BLD AUTO: 10.5 FL (ref 8.9–12.9)
PMV BLD AUTO: 10.6 FL (ref 8.9–12.9)
PMV BLD AUTO: 10.7 FL (ref 8.9–12.9)
PMV BLD AUTO: 10.8 FL (ref 8.9–12.9)
PMV BLD AUTO: 10.8 FL (ref 8.9–12.9)
PMV BLD AUTO: 10.9 FL (ref 8.9–12.9)
PMV BLD AUTO: 11 FL (ref 8.9–12.9)
PMV BLD AUTO: 11.1 FL (ref 8.9–12.9)
PMV BLD AUTO: 11.1 FL (ref 8.9–12.9)
PMV BLD AUTO: 11.3 FL (ref 8.9–12.9)
PMV BLD AUTO: 9.7 FL (ref 8.9–12.9)
PMV BLD AUTO: 9.7 FL (ref 8.9–12.9)
PO2 BLD: 145 MMHG (ref 80–100)
PO2 BLD: 61 MMHG (ref 80–100)
PO2 BLD: 79 MMHG (ref 80–100)
POTASSIUM SERPL-SCNC: 3.8 MMOL/L (ref 3.5–5.1)
POTASSIUM SERPL-SCNC: 4 MMOL/L (ref 3.5–5.1)
POTASSIUM SERPL-SCNC: 4 MMOL/L (ref 3.5–5.1)
POTASSIUM SERPL-SCNC: 4.1 MMOL/L (ref 3.5–5.1)
POTASSIUM SERPL-SCNC: 4.1 MMOL/L (ref 3.5–5.1)
POTASSIUM SERPL-SCNC: 4.2 MMOL/L (ref 3.5–5.1)
POTASSIUM SERPL-SCNC: 4.2 MMOL/L (ref 3.5–5.1)
POTASSIUM SERPL-SCNC: 4.4 MMOL/L (ref 3.5–5.1)
POTASSIUM SERPL-SCNC: 4.5 MMOL/L (ref 3.5–5.1)
POTASSIUM SERPL-SCNC: 4.5 MMOL/L (ref 3.5–5.1)
POTASSIUM SERPL-SCNC: 4.6 MMOL/L (ref 3.5–5.1)
POTASSIUM SERPL-SCNC: 4.7 MMOL/L (ref 3.5–5.1)
POTASSIUM SERPL-SCNC: 4.7 MMOL/L (ref 3.5–5.1)
POTASSIUM SERPL-SCNC: 4.8 MMOL/L (ref 3.5–5.1)
POTASSIUM SERPL-SCNC: 4.9 MMOL/L (ref 3.5–5.1)
POTASSIUM SERPL-SCNC: 5 MMOL/L (ref 3.5–5.1)
POTASSIUM SERPL-SCNC: 5.1 MMOL/L (ref 3.5–5.1)
POTASSIUM SERPL-SCNC: 5.1 MMOL/L (ref 3.5–5.1)
POTASSIUM SERPL-SCNC: 5.2 MMOL/L (ref 3.5–5.1)
POTASSIUM SERPL-SCNC: 5.2 MMOL/L (ref 3.5–5.1)
POTASSIUM SERPL-SCNC: 5.4 MMOL/L (ref 3.5–5.2)
POTASSIUM SERPL-SCNC: 5.6 MMOL/L (ref 3.5–5.1)
POTASSIUM SERPL-SCNC: 6 MMOL/L (ref 3.5–5.1)
PREALB SERPL-MCNC: 16 MG/DL (ref 10–36)
PROCALCITONIN SERPL-MCNC: 0.05 NG/ML
PROCALCITONIN SERPL-MCNC: 0.13 NG/ML
PROCALCITONIN SERPL-MCNC: 0.14 NG/ML
PROCALCITONIN SERPL-MCNC: 0.14 NG/ML
PROCALCITONIN SERPL-MCNC: 0.19 NG/ML
PROCALCITONIN SERPL-MCNC: 0.27 NG/ML
PROCALCITONIN SERPL-MCNC: 0.42 NG/ML
PROCALCITONIN SERPL-MCNC: 0.95 NG/ML
PROT SERPL-MCNC: 6.6 G/DL (ref 6–8.5)
PROT SERPL-MCNC: 6.7 G/DL (ref 6.4–8.2)
PROT SERPL-MCNC: 6.8 G/DL (ref 6.4–8.2)
PROT SERPL-MCNC: 6.9 G/DL (ref 6.4–8.2)
PROT SERPL-MCNC: 6.9 G/DL (ref 6.4–8.2)
PROT SERPL-MCNC: 7 G/DL (ref 6.4–8.2)
PROT SERPL-MCNC: 7.1 G/DL (ref 6.4–8.2)
PROT SERPL-MCNC: 7.2 G/DL (ref 6.4–8.2)
PROT SERPL-MCNC: 7.2 G/DL (ref 6–8.5)
PROT SERPL-MCNC: 7.3 G/DL (ref 6.4–8.2)
PROT SERPL-MCNC: 7.4 G/DL (ref 6.4–8.2)
PROT SERPL-MCNC: 7.5 G/DL (ref 6.4–8.2)
PROT SERPL-MCNC: 7.5 G/DL (ref 6.4–8.2)
PROT SERPL-MCNC: 7.6 G/DL (ref 6.4–8.2)
PROT UR STRIP-MCNC: 100 MG/DL
PROTHROMBIN TIME: 20 SEC (ref 9–11.1)
PROTHROMBIN TIME: 20.3 SEC (ref 9–11.1)
PROTHROMBIN TIME: 20.4 SEC (ref 9–11.1)
PROTHROMBIN TIME: 20.8 SEC (ref 9–11.1)
PROTHROMBIN TIME: 21.7 SEC (ref 9.1–12)
PROTHROMBIN TIME: 22.3 SEC (ref 9–11.1)
PROTHROMBIN TIME: 22.7 SEC (ref 9–11.1)
PROTHROMBIN TIME: 22.8 SEC (ref 9–11.1)
PROTHROMBIN TIME: 23.5 SEC (ref 9–11.1)
PROTHROMBIN TIME: 23.6 SEC (ref 9–11.1)
PROTHROMBIN TIME: 23.7 SEC (ref 9–11.1)
PROTHROMBIN TIME: 24 SEC (ref 9–11.1)
PROTHROMBIN TIME: 24.3 SEC (ref 9–11.1)
PROTHROMBIN TIME: 24.5 SEC (ref 9–11.1)
PROTHROMBIN TIME: 24.5 SEC (ref 9–11.1)
PROTHROMBIN TIME: 25.7 SEC (ref 9–11.1)
PROTHROMBIN TIME: 25.8 SEC (ref 11.9–14.7)
PROTHROMBIN TIME: 25.8 SEC (ref 9–11.1)
PROTHROMBIN TIME: 25.8 SEC (ref 9–11.1)
PROTHROMBIN TIME: 25.9 SEC (ref 9–11.1)
PROTHROMBIN TIME: 26.5 SEC (ref 9–11.1)
PROTHROMBIN TIME: 26.7 SEC (ref 9–11.1)
PROTHROMBIN TIME: 27 SEC (ref 9–11.1)
PROTHROMBIN TIME: 27.4 SEC (ref 9–11.1)
PROTHROMBIN TIME: 29.9 SEC (ref 9–11.1)
Q-T INTERVAL, ECG07: 180 MS
Q-T INTERVAL, ECG07: 180 MS
Q-T INTERVAL, ECG07: 434 MS
Q-T INTERVAL, ECG07: 446 MS
Q-T INTERVAL, ECG07: 450 MS
Q-T INTERVAL, ECG07: 494 MS
Q-T INTERVAL, ECG07: 520 MS
QRS DURATION, ECG06: 136 MS
QRS DURATION, ECG06: 170 MS
QRS DURATION, ECG06: 180 MS
QRS DURATION, ECG06: 190 MS
QRS DURATION, ECG06: 198 MS
QRS DURATION, ECG06: 20 MS
QRS DURATION, ECG06: 26 MS
QTC CALCULATION (BEZET), ECG08: 262 MS
QTC CALCULATION (BEZET), ECG08: 268 MS
QTC CALCULATION (BEZET), ECG08: 488 MS
QTC CALCULATION (BEZET), ECG08: 508 MS
QTC CALCULATION (BEZET), ECG08: 516 MS
QTC CALCULATION (BEZET), ECG08: 551 MS
QTC CALCULATION (BEZET), ECG08: 677 MS
RBC # BLD AUTO: 2.44 M/UL (ref 3.8–5.2)
RBC # BLD AUTO: 2.64 M/UL (ref 3.8–5.2)
RBC # BLD AUTO: 2.68 M/UL (ref 3.8–5.2)
RBC # BLD AUTO: 2.76 M/UL (ref 3.8–5.2)
RBC # BLD AUTO: 2.8 M/UL (ref 3.8–5.2)
RBC # BLD AUTO: 2.87 M/UL (ref 3.8–5.2)
RBC # BLD AUTO: 2.9 M/UL (ref 3.8–5.2)
RBC # BLD AUTO: 2.96 M/UL (ref 3.8–5.2)
RBC # BLD AUTO: 3.01 M/UL (ref 3.8–5.2)
RBC # BLD AUTO: 3.06 M/UL (ref 3.8–5.2)
RBC # BLD AUTO: 3.09 M/UL (ref 3.8–5.2)
RBC # BLD AUTO: 3.2 M/UL (ref 3.8–5.2)
RBC # BLD AUTO: 3.57 M/UL (ref 3.8–5.2)
RBC # BLD AUTO: 3.59 M/UL (ref 3.8–5.2)
RBC # BLD AUTO: 3.64 M/UL (ref 3.8–5.2)
RBC # BLD AUTO: 3.66 M/UL (ref 3.8–5.2)
RBC # BLD AUTO: 3.68 M/UL (ref 3.8–5.2)
RBC # BLD AUTO: 3.68 M/UL (ref 3.8–5.2)
RBC # BLD AUTO: 3.75 X10E6/UL (ref 3.77–5.28)
RBC # BLD AUTO: 3.77 M/UL (ref 3.8–5.2)
RBC # BLD AUTO: 3.77 M/UL (ref 3.8–5.2)
RBC # BLD AUTO: 3.81 M/UL (ref 3.8–5.2)
RBC # BLD AUTO: 3.82 M/UL (ref 3.8–5.2)
RBC # BLD AUTO: 3.9 M/UL (ref 3.8–5.2)
RBC #/AREA URNS HPF: ABNORMAL /HPF (ref 0–5)
RBC MORPH BLD: ABNORMAL
REPORTED DOSE,DOSE: ABNORMAL UNITS
REPORTED DOSE/TIME,TMG: ABNORMAL
SAMPLES BEING HELD,HOLD: NORMAL
SAO2 % BLD: 90 % (ref 92–97)
SAO2 % BLD: 95 % (ref 92–97)
SAO2 % BLD: 99 % (ref 92–97)
SERVICE CMNT-IMP: ABNORMAL
SERVICE CMNT-IMP: NORMAL
SODIUM SERPL-SCNC: 136 MMOL/L (ref 136–145)
SODIUM SERPL-SCNC: 137 MMOL/L (ref 136–145)
SODIUM SERPL-SCNC: 137 MMOL/L (ref 136–145)
SODIUM SERPL-SCNC: 138 MMOL/L (ref 136–145)
SODIUM SERPL-SCNC: 139 MMOL/L (ref 136–145)
SODIUM SERPL-SCNC: 140 MMOL/L (ref 134–144)
SODIUM SERPL-SCNC: 140 MMOL/L (ref 136–145)
SODIUM SERPL-SCNC: 141 MMOL/L (ref 136–145)
SODIUM SERPL-SCNC: 142 MMOL/L (ref 136–145)
SODIUM SERPL-SCNC: 142 MMOL/L (ref 136–145)
SODIUM SERPL-SCNC: 143 MMOL/L (ref 136–145)
SODIUM UR-SCNC: 32 MMOL/L
SP GR UR REFRACTOMETRY: 1.01 (ref 1–1.03)
SPECIAL REQUESTS,SREQ: NORMAL
SPECIMEN EXP DATE BLD: NORMAL
SPECIMEN TYPE: ABNORMAL
STATUS OF UNIT,%ST: NORMAL
T3FREE SERPL-MCNC: 2 PG/ML (ref 2.2–4)
T4 FREE SERPL-MCNC: 1.46 NG/DL (ref 0.82–1.77)
THERAPEUTIC RANGE,PTTT: ABNORMAL SECS (ref 58–77)
TIBC SERPL-MCNC: 197 UG/DL (ref 250–450)
TIBC SERPL-MCNC: 236 UG/DL (ref 250–450)
TIBC SERPL-MCNC: 294 UG/DL (ref 250–450)
TIBC SERPL-MCNC: 301 UG/DL (ref 250–450)
TIBC SERPL-MCNC: 348 UG/DL (ref 250–450)
TIBC SERPL-MCNC: 560 UG/DL (ref 250–450)
TOTAL RESP. RATE, ITRR: 16
TOTAL RESP. RATE, ITRR: 20
TRIGL SERPL-MCNC: 222 MG/DL (ref 0–149)
TSH SERPL DL<=0.005 MIU/L-ACNC: 2.98 UIU/ML (ref 0.45–4.5)
UA: UC IF INDICATED,UAUC: ABNORMAL
UIBC SERPL-MCNC: 267 UG/DL (ref 118–369)
UNIT DIVISION, %UDIV: 0
URATE SERPL-MCNC: 12.4 MG/DL (ref 2.6–6)
URATE SERPL-MCNC: 7.3 MG/DL (ref 2.5–7.1)
URATE SERPL-MCNC: 8.2 MG/DL (ref 2.6–6)
URATE UR-MCNC: 48.5 MG/DL
UROBILINOGEN UR QL STRIP.AUTO: 0.1 EU/DL (ref 0.1–1)
UUN UR-MCNC: 737 MG/DL
VANCOMYCIN TROUGH SERPL-MCNC: 26.6 UG/ML (ref 5–10)
VENTRICULAR RATE, ECG03: 102 BPM
VENTRICULAR RATE, ECG03: 128 BPM
VENTRICULAR RATE, ECG03: 134 BPM
VENTRICULAR RATE, ECG03: 75 BPM
VENTRICULAR RATE, ECG03: 76 BPM
VENTRICULAR RATE, ECG03: 78 BPM
VENTRICULAR RATE, ECG03: 79 BPM
VIT B12 SERPL-MCNC: 283 PG/ML (ref 193–986)
VLDLC SERPL CALC-MCNC: 40 MG/DL (ref 5–40)
WBC # BLD AUTO: 10.1 K/UL (ref 3.6–11)
WBC # BLD AUTO: 11.4 K/UL (ref 3.6–11)
WBC # BLD AUTO: 11.8 K/UL (ref 3.6–11)
WBC # BLD AUTO: 14.1 K/UL (ref 3.6–11)
WBC # BLD AUTO: 5.1 K/UL (ref 3.6–11)
WBC # BLD AUTO: 5.4 K/UL (ref 3.6–11)
WBC # BLD AUTO: 5.5 K/UL (ref 3.6–11)
WBC # BLD AUTO: 6.2 K/UL (ref 3.6–11)
WBC # BLD AUTO: 6.3 K/UL (ref 3.6–11)
WBC # BLD AUTO: 6.4 K/UL (ref 3.6–11)
WBC # BLD AUTO: 6.5 K/UL (ref 3.6–11)
WBC # BLD AUTO: 6.6 K/UL (ref 3.6–11)
WBC # BLD AUTO: 6.7 K/UL (ref 3.6–11)
WBC # BLD AUTO: 6.8 K/UL (ref 3.6–11)
WBC # BLD AUTO: 6.8 K/UL (ref 3.6–11)
WBC # BLD AUTO: 6.9 K/UL (ref 3.6–11)
WBC # BLD AUTO: 6.9 K/UL (ref 3.6–11)
WBC # BLD AUTO: 7 K/UL (ref 3.6–11)
WBC # BLD AUTO: 7.1 K/UL (ref 3.6–11)
WBC # BLD AUTO: 7.2 X10E3/UL (ref 3.4–10.8)
WBC # BLD AUTO: 7.4 K/UL (ref 3.6–11)
WBC # BLD AUTO: 7.6 K/UL (ref 3.6–11)
WBC # BLD AUTO: 7.7 K/UL (ref 3.6–11)
WBC # BLD AUTO: 8.5 K/UL (ref 3.6–11)
WBC # BLD AUTO: 9.1 K/UL (ref 3.6–11)
WBC # BLD AUTO: 9.6 K/UL (ref 3.6–11)
WBC URNS QL MICRO: ABNORMAL /HPF (ref 0–4)

## 2020-01-01 PROCEDURE — 80053 COMPREHEN METABOLIC PANEL: CPT

## 2020-01-01 PROCEDURE — 93750 INTERROGATION VAD IN PERSON: CPT | Performed by: THORACIC SURGERY (CARDIOTHORACIC VASCULAR SURGERY)

## 2020-01-01 PROCEDURE — 83735 ASSAY OF MAGNESIUM: CPT

## 2020-01-01 PROCEDURE — 74011000258 HC RX REV CODE- 258: Performed by: NURSE PRACTITIONER

## 2020-01-01 PROCEDURE — 85730 THROMBOPLASTIN TIME PARTIAL: CPT

## 2020-01-01 PROCEDURE — 65270000029 HC RM PRIVATE

## 2020-01-01 PROCEDURE — 99222 1ST HOSP IP/OBS MODERATE 55: CPT | Performed by: NURSE PRACTITIONER

## 2020-01-01 PROCEDURE — 74011250637 HC RX REV CODE- 250/637: Performed by: INTERNAL MEDICINE

## 2020-01-01 PROCEDURE — 85610 PROTHROMBIN TIME: CPT

## 2020-01-01 PROCEDURE — 36415 COLL VENOUS BLD VENIPUNCTURE: CPT

## 2020-01-01 PROCEDURE — 0JBP0ZZ EXCISION OF LEFT LOWER LEG SUBCUTANEOUS TISSUE AND FASCIA, OPEN APPROACH: ICD-10-PCS | Performed by: SURGERY

## 2020-01-01 PROCEDURE — 85027 COMPLETE CBC AUTOMATED: CPT

## 2020-01-01 PROCEDURE — 82962 GLUCOSE BLOOD TEST: CPT

## 2020-01-01 PROCEDURE — 97530 THERAPEUTIC ACTIVITIES: CPT

## 2020-01-01 PROCEDURE — 74011250636 HC RX REV CODE- 250/636: Performed by: NURSE PRACTITIONER

## 2020-01-01 PROCEDURE — 74011250636 HC RX REV CODE- 250/636: Performed by: INTERNAL MEDICINE

## 2020-01-01 PROCEDURE — 83880 ASSAY OF NATRIURETIC PEPTIDE: CPT

## 2020-01-01 PROCEDURE — 74011250637 HC RX REV CODE- 250/637: Performed by: NURSE PRACTITIONER

## 2020-01-01 PROCEDURE — 93451 RIGHT HEART CATH: CPT | Performed by: INTERNAL MEDICINE

## 2020-01-01 PROCEDURE — 94760 N-INVAS EAR/PLS OXIMETRY 1: CPT

## 2020-01-01 PROCEDURE — 83615 LACTATE (LD) (LDH) ENZYME: CPT

## 2020-01-01 PROCEDURE — 94640 AIRWAY INHALATION TREATMENT: CPT

## 2020-01-01 PROCEDURE — 74011250637 HC RX REV CODE- 250/637: Performed by: HOSPITALIST

## 2020-01-01 PROCEDURE — 74011636637 HC RX REV CODE- 636/637: Performed by: NURSE PRACTITIONER

## 2020-01-01 PROCEDURE — 93005 ELECTROCARDIOGRAM TRACING: CPT

## 2020-01-01 PROCEDURE — 83051 HEMOGLOBIN PLASMA: CPT

## 2020-01-01 PROCEDURE — 93750 INTERROGATION VAD IN PERSON: CPT | Performed by: INTERNAL MEDICINE

## 2020-01-01 PROCEDURE — 83605 ASSAY OF LACTIC ACID: CPT

## 2020-01-01 PROCEDURE — 86920 COMPATIBILITY TEST SPIN: CPT

## 2020-01-01 PROCEDURE — 99233 SBSQ HOSP IP/OBS HIGH 50: CPT | Performed by: INTERNAL MEDICINE

## 2020-01-01 PROCEDURE — 99223 1ST HOSP IP/OBS HIGH 75: CPT | Performed by: SURGERY

## 2020-01-01 PROCEDURE — 87040 BLOOD CULTURE FOR BACTERIA: CPT

## 2020-01-01 PROCEDURE — 74011250637 HC RX REV CODE- 250/637: Performed by: PSYCHIATRY & NEUROLOGY

## 2020-01-01 PROCEDURE — 97535 SELF CARE MNGMENT TRAINING: CPT

## 2020-01-01 PROCEDURE — 99231 SBSQ HOSP IP/OBS SF/LOW 25: CPT | Performed by: SURGERY

## 2020-01-01 PROCEDURE — 74011000258 HC RX REV CODE- 258: Performed by: INTERNAL MEDICINE

## 2020-01-01 PROCEDURE — 86922 COMPATIBILITY TEST ANTIGLOB: CPT

## 2020-01-01 PROCEDURE — 93750 INTERROGATION VAD IN PERSON: CPT | Performed by: NURSE PRACTITIONER

## 2020-01-01 PROCEDURE — P9045 ALBUMIN (HUMAN), 5%, 250 ML: HCPCS | Performed by: NURSE PRACTITIONER

## 2020-01-01 PROCEDURE — 97116 GAIT TRAINING THERAPY: CPT

## 2020-01-01 PROCEDURE — 90471 IMMUNIZATION ADMIN: CPT

## 2020-01-01 PROCEDURE — 82803 BLOOD GASES ANY COMBINATION: CPT

## 2020-01-01 PROCEDURE — 84132 ASSAY OF SERUM POTASSIUM: CPT

## 2020-01-01 PROCEDURE — 74011636637 HC RX REV CODE- 636/637: Performed by: INTERNAL MEDICINE

## 2020-01-01 PROCEDURE — 99000 SPECIMEN HANDLING OFFICE-LAB: CPT | Performed by: INTERNAL MEDICINE

## 2020-01-01 PROCEDURE — 65660000000 HC RM CCU STEPDOWN

## 2020-01-01 PROCEDURE — 2709999900 HC NON-CHARGEABLE SUPPLY

## 2020-01-01 PROCEDURE — 86902 BLOOD TYPE ANTIGEN DONOR EA: CPT

## 2020-01-01 PROCEDURE — 99231 SBSQ HOSP IP/OBS SF/LOW 25: CPT | Performed by: THORACIC SURGERY (CARDIOTHORACIC VASCULAR SURGERY)

## 2020-01-01 PROCEDURE — G8510 SCR DEP NEG, NO PLAN REQD: HCPCS | Performed by: INTERNAL MEDICINE

## 2020-01-01 PROCEDURE — 85025 COMPLETE CBC W/AUTO DIFF WBC: CPT

## 2020-01-01 PROCEDURE — 87205 SMEAR GRAM STAIN: CPT

## 2020-01-01 PROCEDURE — 99215 OFFICE O/P EST HI 40 MIN: CPT | Performed by: NURSE PRACTITIONER

## 2020-01-01 PROCEDURE — 99285 EMERGENCY DEPT VISIT HI MDM: CPT

## 2020-01-01 PROCEDURE — G8427 DOCREV CUR MEDS BY ELIG CLIN: HCPCS | Performed by: INTERNAL MEDICINE

## 2020-01-01 PROCEDURE — 74011000250 HC RX REV CODE- 250: Performed by: INTERNAL MEDICINE

## 2020-01-01 PROCEDURE — 86334 IMMUNOFIX E-PHORESIS SERUM: CPT

## 2020-01-01 PROCEDURE — 99232 SBSQ HOSP IP/OBS MODERATE 35: CPT | Performed by: INTERNAL MEDICINE

## 2020-01-01 PROCEDURE — 84100 ASSAY OF PHOSPHORUS: CPT

## 2020-01-01 PROCEDURE — 93306 TTE W/DOPPLER COMPLETE: CPT | Performed by: SPECIALIST

## 2020-01-01 PROCEDURE — 83540 ASSAY OF IRON: CPT

## 2020-01-01 PROCEDURE — 82436 ASSAY OF URINE CHLORIDE: CPT

## 2020-01-01 PROCEDURE — G8427 DOCREV CUR MEDS BY ELIG CLIN: HCPCS | Performed by: NURSE PRACTITIONER

## 2020-01-01 PROCEDURE — 99205 OFFICE O/P NEW HI 60 MIN: CPT | Performed by: INTERNAL MEDICINE

## 2020-01-01 PROCEDURE — 3017F COLORECTAL CA SCREEN DOC REV: CPT | Performed by: INTERNAL MEDICINE

## 2020-01-01 PROCEDURE — 11042 DBRDMT SUBQ TIS 1ST 20SQCM/<: CPT | Performed by: SURGERY

## 2020-01-01 PROCEDURE — 36430 TRANSFUSION BLD/BLD COMPNT: CPT

## 2020-01-01 PROCEDURE — 65660000001 HC RM ICU INTERMED STEPDOWN

## 2020-01-01 PROCEDURE — 77030038269 HC DRN EXT URIN PURWCK BARD -A

## 2020-01-01 PROCEDURE — 77030013797 HC KT TRNSDUC PRSSR EDWD -A: Performed by: INTERNAL MEDICINE

## 2020-01-01 PROCEDURE — 80202 ASSAY OF VANCOMYCIN: CPT

## 2020-01-01 PROCEDURE — 71250 CT THORAX DX C-: CPT

## 2020-01-01 PROCEDURE — 99223 1ST HOSP IP/OBS HIGH 75: CPT | Performed by: INTERNAL MEDICINE

## 2020-01-01 PROCEDURE — 90732 PPSV23 VACC 2 YRS+ SUBQ/IM: CPT | Performed by: NURSE PRACTITIONER

## 2020-01-01 PROCEDURE — 86870 RBC ANTIBODY IDENTIFICATION: CPT

## 2020-01-01 PROCEDURE — 86905 BLOOD TYPING RBC ANTIGENS: CPT

## 2020-01-01 PROCEDURE — C1751 CATH, INF, PER/CENT/MIDLINE: HCPCS | Performed by: INTERNAL MEDICINE

## 2020-01-01 PROCEDURE — 70450 CT HEAD/BRAIN W/O DYE: CPT

## 2020-01-01 PROCEDURE — 84145 PROCALCITONIN (PCT): CPT

## 2020-01-01 PROCEDURE — 77030013406 HC CATH CTRL EDWD -B: Performed by: INTERNAL MEDICINE

## 2020-01-01 PROCEDURE — 94664 DEMO&/EVAL PT USE INHALER: CPT

## 2020-01-01 PROCEDURE — 3017F COLORECTAL CA SCREEN DOC REV: CPT | Performed by: NURSE PRACTITIONER

## 2020-01-01 PROCEDURE — 1111F DSCHRG MED/CURRENT MED MERGE: CPT | Performed by: NURSE PRACTITIONER

## 2020-01-01 PROCEDURE — 81001 URINALYSIS AUTO W/SCOPE: CPT

## 2020-01-01 PROCEDURE — 99233 SBSQ HOSP IP/OBS HIGH 50: CPT | Performed by: NURSE PRACTITIONER

## 2020-01-01 PROCEDURE — 85652 RBC SED RATE AUTOMATED: CPT

## 2020-01-01 PROCEDURE — 99232 SBSQ HOSP IP/OBS MODERATE 35: CPT | Performed by: NURSE PRACTITIONER

## 2020-01-01 PROCEDURE — 1090F PRES/ABSN URINE INCON ASSESS: CPT | Performed by: INTERNAL MEDICINE

## 2020-01-01 PROCEDURE — 87086 URINE CULTURE/COLONY COUNT: CPT

## 2020-01-01 PROCEDURE — 82728 ASSAY OF FERRITIN: CPT

## 2020-01-01 PROCEDURE — 77030013744: Performed by: INTERNAL MEDICINE

## 2020-01-01 PROCEDURE — 74011250636 HC RX REV CODE- 250/636: Performed by: HOSPITALIST

## 2020-01-01 PROCEDURE — 77030037442 HC TY LVAD MGMT SYST CMP-B

## 2020-01-01 PROCEDURE — G8417 CALC BMI ABV UP PARAM F/U: HCPCS | Performed by: INTERNAL MEDICINE

## 2020-01-01 PROCEDURE — 84550 ASSAY OF BLOOD/URIC ACID: CPT

## 2020-01-01 PROCEDURE — 84481 FREE ASSAY (FT-3): CPT

## 2020-01-01 PROCEDURE — 82550 ASSAY OF CK (CPK): CPT

## 2020-01-01 PROCEDURE — 90715 TDAP VACCINE 7 YRS/> IM: CPT | Performed by: EMERGENCY MEDICINE

## 2020-01-01 PROCEDURE — 74011250636 HC RX REV CODE- 250/636: Performed by: EMERGENCY MEDICINE

## 2020-01-01 PROCEDURE — 87077 CULTURE AEROBIC IDENTIFY: CPT

## 2020-01-01 PROCEDURE — 93306 TTE W/DOPPLER COMPLETE: CPT

## 2020-01-01 PROCEDURE — 74011000258 HC RX REV CODE- 258: Performed by: HOSPITALIST

## 2020-01-01 PROCEDURE — P9047 ALBUMIN (HUMAN), 25%, 50ML: HCPCS | Performed by: INTERNAL MEDICINE

## 2020-01-01 PROCEDURE — 71045 X-RAY EXAM CHEST 1 VIEW: CPT

## 2020-01-01 PROCEDURE — 99223 1ST HOSP IP/OBS HIGH 75: CPT | Performed by: NURSE PRACTITIONER

## 2020-01-01 PROCEDURE — G8400 PT W/DXA NO RESULTS DOC: HCPCS | Performed by: INTERNAL MEDICINE

## 2020-01-01 PROCEDURE — 99223 1ST HOSP IP/OBS HIGH 75: CPT | Performed by: PSYCHIATRY & NEUROLOGY

## 2020-01-01 PROCEDURE — 80069 RENAL FUNCTION PANEL: CPT

## 2020-01-01 PROCEDURE — 36600 WITHDRAWAL OF ARTERIAL BLOOD: CPT

## 2020-01-01 PROCEDURE — 99152 MOD SED SAME PHYS/QHP 5/>YRS: CPT | Performed by: INTERNAL MEDICINE

## 2020-01-01 PROCEDURE — 82140 ASSAY OF AMMONIA: CPT

## 2020-01-01 PROCEDURE — 90686 IIV4 VACC NO PRSV 0.5 ML IM: CPT | Performed by: INTERNAL MEDICINE

## 2020-01-01 PROCEDURE — 82746 ASSAY OF FOLIC ACID SERUM: CPT

## 2020-01-01 PROCEDURE — 1090F PRES/ABSN URINE INCON ASSESS: CPT | Performed by: NURSE PRACTITIONER

## 2020-01-01 PROCEDURE — 97535 SELF CARE MNGMENT TRAINING: CPT | Performed by: OCCUPATIONAL THERAPIST

## 2020-01-01 PROCEDURE — 93000 ELECTROCARDIOGRAM COMPLETE: CPT | Performed by: INTERNAL MEDICINE

## 2020-01-01 PROCEDURE — 4A023N6 MEASUREMENT OF CARDIAC SAMPLING AND PRESSURE, RIGHT HEART, PERCUTANEOUS APPROACH: ICD-10-PCS | Performed by: INTERNAL MEDICINE

## 2020-01-01 PROCEDURE — 86921 COMPATIBILITY TEST INCUBATE: CPT

## 2020-01-01 PROCEDURE — 99222 1ST HOSP IP/OBS MODERATE 55: CPT | Performed by: INTERNAL MEDICINE

## 2020-01-01 PROCEDURE — 99231 SBSQ HOSP IP/OBS SF/LOW 25: CPT | Performed by: NURSE PRACTITIONER

## 2020-01-01 PROCEDURE — 82607 VITAMIN B-12: CPT

## 2020-01-01 PROCEDURE — 99153 MOD SED SAME PHYS/QHP EA: CPT | Performed by: INTERNAL MEDICINE

## 2020-01-01 PROCEDURE — 74176 CT ABD & PELVIS W/O CONTRAST: CPT

## 2020-01-01 PROCEDURE — 99282 EMERGENCY DEPT VISIT SF MDM: CPT

## 2020-01-01 PROCEDURE — 82570 ASSAY OF URINE CREATININE: CPT

## 2020-01-01 PROCEDURE — P9016 RBC LEUKOCYTES REDUCED: HCPCS

## 2020-01-01 PROCEDURE — 87186 SC STD MICRODIL/AGAR DIL: CPT

## 2020-01-01 PROCEDURE — G8432 DEP SCR NOT DOC, RNG: HCPCS | Performed by: NURSE PRACTITIONER

## 2020-01-01 PROCEDURE — 97166 OT EVAL MOD COMPLEX 45 MIN: CPT

## 2020-01-01 PROCEDURE — 84300 ASSAY OF URINE SODIUM: CPT

## 2020-01-01 PROCEDURE — 74011250637 HC RX REV CODE- 250/637: Performed by: EMERGENCY MEDICINE

## 2020-01-01 PROCEDURE — 1101F PT FALLS ASSESS-DOCD LE1/YR: CPT | Performed by: NURSE PRACTITIONER

## 2020-01-01 PROCEDURE — 1101F PT FALLS ASSESS-DOCD LE1/YR: CPT | Performed by: INTERNAL MEDICINE

## 2020-01-01 PROCEDURE — 85018 HEMOGLOBIN: CPT

## 2020-01-01 PROCEDURE — P9047 ALBUMIN (HUMAN), 25%, 50ML: HCPCS | Performed by: NURSE PRACTITIONER

## 2020-01-01 PROCEDURE — 86900 BLOOD TYPING SEROLOGIC ABO: CPT

## 2020-01-01 PROCEDURE — 97165 OT EVAL LOW COMPLEX 30 MIN: CPT

## 2020-01-01 PROCEDURE — 84560 ASSAY OF URINE/URIC ACID: CPT

## 2020-01-01 PROCEDURE — 73590 X-RAY EXAM OF LOWER LEG: CPT

## 2020-01-01 PROCEDURE — 99232 SBSQ HOSP IP/OBS MODERATE 35: CPT | Performed by: SPECIALIST

## 2020-01-01 PROCEDURE — 84540 ASSAY OF URINE/UREA-N: CPT

## 2020-01-01 PROCEDURE — G8536 NO DOC ELDER MAL SCRN: HCPCS | Performed by: INTERNAL MEDICINE

## 2020-01-01 PROCEDURE — 97161 PT EVAL LOW COMPLEX 20 MIN: CPT

## 2020-01-01 PROCEDURE — 77030041071 HC DRSG COLLGN MDII -A

## 2020-01-01 PROCEDURE — C1760 CLOSURE DEV, VASC: HCPCS | Performed by: INTERNAL MEDICINE

## 2020-01-01 PROCEDURE — G8400 PT W/DXA NO RESULTS DOC: HCPCS | Performed by: NURSE PRACTITIONER

## 2020-01-01 PROCEDURE — 96374 THER/PROPH/DIAG INJ IV PUSH: CPT

## 2020-01-01 PROCEDURE — A9538 TC99M PYROPHOSPHATE: HCPCS

## 2020-01-01 PROCEDURE — 83036 HEMOGLOBIN GLYCOSYLATED A1C: CPT

## 2020-01-01 PROCEDURE — G8417 CALC BMI ABV UP PARAM F/U: HCPCS | Performed by: NURSE PRACTITIONER

## 2020-01-01 PROCEDURE — 83010 ASSAY OF HAPTOGLOBIN QUANT: CPT

## 2020-01-01 PROCEDURE — G8536 NO DOC ELDER MAL SCRN: HCPCS | Performed by: NURSE PRACTITIONER

## 2020-01-01 PROCEDURE — 99215 OFFICE O/P EST HI 40 MIN: CPT | Performed by: INTERNAL MEDICINE

## 2020-01-01 RX ORDER — DOCUSATE SODIUM 100 MG/1
100 CAPSULE, LIQUID FILLED ORAL 2 TIMES DAILY
Status: DISCONTINUED | OUTPATIENT
Start: 2020-01-01 | End: 2020-01-01 | Stop reason: SDUPTHER

## 2020-01-01 RX ORDER — HYDRALAZINE HYDROCHLORIDE 25 MG/1
25 TABLET, FILM COATED ORAL 3 TIMES DAILY
Status: DISCONTINUED | OUTPATIENT
Start: 2020-01-01 | End: 2020-01-01 | Stop reason: HOSPADM

## 2020-01-01 RX ORDER — RANOLAZINE 500 MG/1
500 TABLET, EXTENDED RELEASE ORAL 2 TIMES DAILY
Status: DISCONTINUED | OUTPATIENT
Start: 2020-01-01 | End: 2020-01-01

## 2020-01-01 RX ORDER — GABAPENTIN 300 MG/1
300 CAPSULE ORAL DAILY
Status: DISCONTINUED | OUTPATIENT
Start: 2020-01-01 | End: 2020-01-01

## 2020-01-01 RX ORDER — ACETAMINOPHEN 325 MG/1
650 TABLET ORAL
Status: DISCONTINUED | OUTPATIENT
Start: 2020-01-01 | End: 2020-01-01 | Stop reason: HOSPADM

## 2020-01-01 RX ORDER — HYDRALAZINE HYDROCHLORIDE 20 MG/ML
10 INJECTION INTRAMUSCULAR; INTRAVENOUS
Status: DISCONTINUED | OUTPATIENT
Start: 2020-01-01 | End: 2020-01-01 | Stop reason: HOSPADM

## 2020-01-01 RX ORDER — LANOLIN ALCOHOL/MO/W.PET/CERES
400 CREAM (GRAM) TOPICAL DAILY
Status: DISCONTINUED | OUTPATIENT
Start: 2020-01-01 | End: 2020-01-01

## 2020-01-01 RX ORDER — WARFARIN 4 MG/1
4 TABLET ORAL ONCE
Status: COMPLETED | OUTPATIENT
Start: 2020-01-01 | End: 2020-01-01

## 2020-01-01 RX ORDER — BUMETANIDE 1 MG/1
1 TABLET ORAL DAILY
Status: DISCONTINUED | OUTPATIENT
Start: 2020-01-01 | End: 2020-01-01

## 2020-01-01 RX ORDER — ALBUTEROL SULFATE 90 UG/1
2 AEROSOL, METERED RESPIRATORY (INHALATION)
Qty: 1 INHALER | Refills: 1 | Status: SHIPPED | OUTPATIENT
Start: 2020-01-01 | End: 2021-01-01 | Stop reason: ALTCHOICE

## 2020-01-01 RX ORDER — CARVEDILOL 12.5 MG/1
25 TABLET ORAL 2 TIMES DAILY WITH MEALS
Status: DISCONTINUED | OUTPATIENT
Start: 2020-01-01 | End: 2020-01-01

## 2020-01-01 RX ORDER — BUMETANIDE 1 MG/1
1 TABLET ORAL DAILY
Status: DISCONTINUED | OUTPATIENT
Start: 2020-01-01 | End: 2020-01-01 | Stop reason: HOSPADM

## 2020-01-01 RX ORDER — DOCUSATE SODIUM 100 MG/1
100 CAPSULE, LIQUID FILLED ORAL 2 TIMES DAILY
Status: DISCONTINUED | OUTPATIENT
Start: 2020-01-01 | End: 2020-01-01 | Stop reason: HOSPADM

## 2020-01-01 RX ORDER — WARFARIN 1 MG/1
2 TABLET ORAL ONCE
Status: COMPLETED | OUTPATIENT
Start: 2020-01-01 | End: 2020-01-01

## 2020-01-01 RX ORDER — ONDANSETRON 2 MG/ML
4 INJECTION INTRAMUSCULAR; INTRAVENOUS
Status: DISCONTINUED | OUTPATIENT
Start: 2020-01-01 | End: 2020-01-01 | Stop reason: HOSPADM

## 2020-01-01 RX ORDER — ACETAMINOPHEN 650 MG/1
650 SUPPOSITORY RECTAL
Status: DISCONTINUED | OUTPATIENT
Start: 2020-01-01 | End: 2020-01-01 | Stop reason: HOSPADM

## 2020-01-01 RX ORDER — AMLODIPINE BESYLATE 5 MG/1
2.5 TABLET ORAL DAILY
Status: DISCONTINUED | OUTPATIENT
Start: 2020-01-01 | End: 2020-01-01

## 2020-01-01 RX ORDER — HYDRALAZINE HYDROCHLORIDE 25 MG/1
50 TABLET, FILM COATED ORAL 3 TIMES DAILY
Qty: 180 TAB | Refills: 0 | Status: SHIPPED | OUTPATIENT
Start: 2020-01-01 | End: 2020-01-01

## 2020-01-01 RX ORDER — MEXILETINE HYDROCHLORIDE 150 MG/1
150 CAPSULE ORAL 3 TIMES DAILY
Status: DISCONTINUED | OUTPATIENT
Start: 2020-01-01 | End: 2020-01-01 | Stop reason: HOSPADM

## 2020-01-01 RX ORDER — POLYETHYLENE GLYCOL 3350 17 G/17G
17 POWDER, FOR SOLUTION ORAL DAILY PRN
Status: DISCONTINUED | OUTPATIENT
Start: 2020-01-01 | End: 2020-01-01 | Stop reason: HOSPADM

## 2020-01-01 RX ORDER — AMMONIUM LACTATE 12 G/100G
LOTION TOPICAL 2 TIMES DAILY
Status: DISCONTINUED | OUTPATIENT
Start: 2020-01-01 | End: 2020-01-01 | Stop reason: HOSPADM

## 2020-01-01 RX ORDER — ICOSAPENT ETHYL 1000 MG/1
1 CAPSULE ORAL 2 TIMES DAILY WITH MEALS
Status: DISCONTINUED | OUTPATIENT
Start: 2020-01-01 | End: 2020-01-01 | Stop reason: HOSPADM

## 2020-01-01 RX ORDER — RANOLAZINE 500 MG/1
1000 TABLET, EXTENDED RELEASE ORAL 2 TIMES DAILY
Status: DISCONTINUED | OUTPATIENT
Start: 2020-01-01 | End: 2020-01-01 | Stop reason: HOSPADM

## 2020-01-01 RX ORDER — SODIUM CHLORIDE 0.9 % (FLUSH) 0.9 %
5-40 SYRINGE (ML) INJECTION AS NEEDED
Status: DISCONTINUED | OUTPATIENT
Start: 2020-01-01 | End: 2020-01-01

## 2020-01-01 RX ORDER — AMOXICILLIN AND CLAVULANATE POTASSIUM 875; 125 MG/1; MG/1
1 TABLET, FILM COATED ORAL 2 TIMES DAILY
COMMUNITY
End: 2020-01-01 | Stop reason: SDUPTHER

## 2020-01-01 RX ORDER — HYDRALAZINE HYDROCHLORIDE 25 MG/1
100 TABLET, FILM COATED ORAL 3 TIMES DAILY
Qty: 360 TAB | Refills: 2 | Status: SHIPPED | OUTPATIENT
Start: 2020-01-01 | End: 2021-01-01 | Stop reason: SDUPTHER

## 2020-01-01 RX ORDER — ACETAMINOPHEN 325 MG/1
650 TABLET ORAL ONCE
Status: ACTIVE | OUTPATIENT
Start: 2020-01-01 | End: 2020-01-01

## 2020-01-01 RX ORDER — WARFARIN 1 MG/1
4 TABLET ORAL ONCE
Status: COMPLETED | OUTPATIENT
Start: 2020-01-01 | End: 2020-01-01

## 2020-01-01 RX ORDER — CEPHALEXIN 500 MG/1
500 CAPSULE ORAL 2 TIMES DAILY
Status: DISCONTINUED | OUTPATIENT
Start: 2020-01-01 | End: 2020-01-01 | Stop reason: DRUGHIGH

## 2020-01-01 RX ORDER — HYDROXYZINE HYDROCHLORIDE 10 MG/1
10 TABLET, FILM COATED ORAL
Status: DISCONTINUED | OUTPATIENT
Start: 2020-01-01 | End: 2020-01-01 | Stop reason: HOSPADM

## 2020-01-01 RX ORDER — WARFARIN SODIUM 5 MG/1
5 TABLET ORAL ONCE
Status: DISCONTINUED | OUTPATIENT
Start: 2020-01-01 | End: 2020-01-01 | Stop reason: DRUGHIGH

## 2020-01-01 RX ORDER — ICOSAPENT ETHYL 1000 MG/1
1 CAPSULE ORAL 2 TIMES DAILY WITH MEALS
Qty: 60 CAP | Refills: 1 | Status: SHIPPED | OUTPATIENT
Start: 2020-01-01 | End: 2020-01-01

## 2020-01-01 RX ORDER — PAROXETINE HYDROCHLORIDE 20 MG/1
30 TABLET, FILM COATED ORAL DAILY
Qty: 45 TAB | Refills: 0 | Status: SHIPPED | OUTPATIENT
Start: 2020-01-01 | End: 2020-01-01

## 2020-01-01 RX ORDER — LANOLIN ALCOHOL/MO/W.PET/CERES
400 CREAM (GRAM) TOPICAL 2 TIMES DAILY
Qty: 60 TAB | Refills: 2 | Status: SHIPPED | OUTPATIENT
Start: 2020-01-01 | End: 2020-01-01

## 2020-01-01 RX ORDER — DEXTROSE MONOHYDRATE 100 MG/ML
250 INJECTION, SOLUTION INTRAVENOUS ONCE
Status: COMPLETED | OUTPATIENT
Start: 2020-01-01 | End: 2020-01-01

## 2020-01-01 RX ORDER — AMLODIPINE BESYLATE 5 MG/1
2.5 TABLET ORAL 2 TIMES DAILY
COMMUNITY
End: 2020-01-01

## 2020-01-01 RX ORDER — LIDOCAINE HYDROCHLORIDE 10 MG/ML
INJECTION INFILTRATION; PERINEURAL AS NEEDED
Status: DISCONTINUED | OUTPATIENT
Start: 2020-01-01 | End: 2020-01-01 | Stop reason: HOSPADM

## 2020-01-01 RX ORDER — ERGOCALCIFEROL 1.25 MG/1
50000 CAPSULE ORAL
Status: DISCONTINUED | OUTPATIENT
Start: 2020-01-01 | End: 2020-01-01 | Stop reason: HOSPADM

## 2020-01-01 RX ORDER — SODIUM CHLORIDE 0.9 % (FLUSH) 0.9 %
5-40 SYRINGE (ML) INJECTION AS NEEDED
Status: DISCONTINUED | OUTPATIENT
Start: 2020-01-01 | End: 2020-01-01 | Stop reason: HOSPADM

## 2020-01-01 RX ORDER — ALBUMIN HUMAN 50 G/1000ML
12.5 SOLUTION INTRAVENOUS ONCE
Status: COMPLETED | OUTPATIENT
Start: 2020-01-01 | End: 2020-01-01

## 2020-01-01 RX ORDER — HYDRALAZINE HYDROCHLORIDE 20 MG/ML
5 INJECTION INTRAMUSCULAR; INTRAVENOUS
Status: DISCONTINUED | OUTPATIENT
Start: 2020-01-01 | End: 2020-01-01 | Stop reason: HOSPADM

## 2020-01-01 RX ORDER — SODIUM CHLORIDE 0.9 % (FLUSH) 0.9 %
5-40 SYRINGE (ML) INJECTION EVERY 8 HOURS
Status: DISCONTINUED | OUTPATIENT
Start: 2020-01-01 | End: 2020-01-01

## 2020-01-01 RX ORDER — ALBUTEROL SULFATE 0.83 MG/ML
2.5 SOLUTION RESPIRATORY (INHALATION)
Status: DISCONTINUED | OUTPATIENT
Start: 2020-01-01 | End: 2020-01-01 | Stop reason: HOSPADM

## 2020-01-01 RX ORDER — CEPHALEXIN 500 MG/1
500 CAPSULE ORAL 2 TIMES DAILY
Qty: 60 CAP | Refills: 0 | Status: SHIPPED | OUTPATIENT
Start: 2020-01-01 | End: 2021-01-01

## 2020-01-01 RX ORDER — WARFARIN 1 MG/1
4 TABLET ORAL DAILY
COMMUNITY
End: 2021-01-01 | Stop reason: SDUPTHER

## 2020-01-01 RX ORDER — CARVEDILOL 12.5 MG/1
12.5 TABLET ORAL 2 TIMES DAILY WITH MEALS
Status: DISCONTINUED | OUTPATIENT
Start: 2020-01-01 | End: 2020-01-01 | Stop reason: HOSPADM

## 2020-01-01 RX ORDER — ACETAMINOPHEN 325 MG/1
650 TABLET ORAL ONCE
Status: COMPLETED | OUTPATIENT
Start: 2020-01-01 | End: 2020-01-01

## 2020-01-01 RX ORDER — GABAPENTIN 300 MG/1
300 CAPSULE ORAL 3 TIMES DAILY
Status: DISCONTINUED | OUTPATIENT
Start: 2020-01-01 | End: 2020-01-01 | Stop reason: HOSPADM

## 2020-01-01 RX ORDER — BUMETANIDE 0.25 MG/ML
1.5 INJECTION INTRAMUSCULAR; INTRAVENOUS ONCE
Status: COMPLETED | OUTPATIENT
Start: 2020-01-01 | End: 2020-01-01

## 2020-01-01 RX ORDER — ALBUMIN HUMAN 250 G/1000ML
25 SOLUTION INTRAVENOUS EVERY 6 HOURS
Status: COMPLETED | OUTPATIENT
Start: 2020-01-01 | End: 2020-01-01

## 2020-01-01 RX ORDER — BUMETANIDE 1 MG/1
1 TABLET ORAL 2 TIMES DAILY
Status: DISCONTINUED | OUTPATIENT
Start: 2020-01-01 | End: 2020-01-01

## 2020-01-01 RX ORDER — AMLODIPINE BESYLATE 5 MG/1
2.5 TABLET ORAL 2 TIMES DAILY
Status: DISCONTINUED | OUTPATIENT
Start: 2020-01-01 | End: 2020-01-01 | Stop reason: HOSPADM

## 2020-01-01 RX ORDER — CARVEDILOL 25 MG/1
12.5 TABLET ORAL 2 TIMES DAILY WITH MEALS
Qty: 30 TAB | Refills: 2 | Status: SHIPPED | OUTPATIENT
Start: 2020-01-01 | End: 2020-01-01

## 2020-01-01 RX ORDER — RANOLAZINE 500 MG/1
1000 TABLET, EXTENDED RELEASE ORAL 2 TIMES DAILY
Qty: 120 TAB | Refills: 1 | Status: SHIPPED | OUTPATIENT
Start: 2020-01-01 | End: 2020-01-01

## 2020-01-01 RX ORDER — SODIUM BICARBONATE 84 MG/ML
50 INJECTION, SOLUTION INTRAVENOUS ONCE
Status: COMPLETED | OUTPATIENT
Start: 2020-01-01 | End: 2020-01-01

## 2020-01-01 RX ORDER — SODIUM CHLORIDE 9 MG/ML
75 INJECTION, SOLUTION INTRAVENOUS CONTINUOUS
Status: DISPENSED | OUTPATIENT
Start: 2020-01-01 | End: 2020-01-01

## 2020-01-01 RX ORDER — CEPHALEXIN 250 MG/1
250 CAPSULE ORAL 2 TIMES DAILY
Status: DISCONTINUED | OUTPATIENT
Start: 2020-01-01 | End: 2020-01-01 | Stop reason: HOSPADM

## 2020-01-01 RX ORDER — TOLTERODINE TARTRATE 2 MG/1
8 TABLET, EXTENDED RELEASE ORAL 2 TIMES DAILY
COMMUNITY
End: 2020-01-01

## 2020-01-01 RX ORDER — GABAPENTIN 300 MG/1
300 CAPSULE ORAL DAILY
Status: DISCONTINUED | OUTPATIENT
Start: 2020-01-01 | End: 2020-01-01 | Stop reason: HOSPADM

## 2020-01-01 RX ORDER — CLONIDINE HYDROCHLORIDE 0.1 MG/1
0.1 TABLET ORAL
Status: DISCONTINUED | OUTPATIENT
Start: 2020-01-01 | End: 2020-01-01 | Stop reason: HOSPADM

## 2020-01-01 RX ORDER — HYDRALAZINE HYDROCHLORIDE 10 MG/1
10 TABLET, FILM COATED ORAL ONCE
Status: COMPLETED | OUTPATIENT
Start: 2020-01-01 | End: 2020-01-01

## 2020-01-01 RX ORDER — MAGNESIUM SULFATE 100 %
4 CRYSTALS MISCELLANEOUS AS NEEDED
Status: DISCONTINUED | OUTPATIENT
Start: 2020-01-01 | End: 2020-01-01 | Stop reason: HOSPADM

## 2020-01-01 RX ORDER — TOLTERODINE TARTRATE 2 MG/1
8 TABLET, EXTENDED RELEASE ORAL 2 TIMES DAILY
Status: DISCONTINUED | OUTPATIENT
Start: 2020-01-01 | End: 2020-01-01 | Stop reason: HOSPADM

## 2020-01-01 RX ORDER — RANOLAZINE 500 MG/1
1000 TABLET, EXTENDED RELEASE ORAL 2 TIMES DAILY
Qty: 120 TAB | Refills: 0 | Status: SHIPPED | OUTPATIENT
Start: 2020-01-01 | End: 2020-01-01 | Stop reason: SDUPTHER

## 2020-01-01 RX ORDER — BUMETANIDE 1 MG/1
1 TABLET ORAL DAILY
Qty: 30 TAB | Refills: 0 | Status: SHIPPED | OUTPATIENT
Start: 2020-01-01 | End: 2020-01-01

## 2020-01-01 RX ORDER — PAROXETINE HYDROCHLORIDE 20 MG/1
30 TABLET, FILM COATED ORAL DAILY
Status: DISCONTINUED | OUTPATIENT
Start: 2020-01-01 | End: 2020-01-01

## 2020-01-01 RX ORDER — HYDROXYZINE HYDROCHLORIDE 10 MG/1
10 TABLET, FILM COATED ORAL
COMMUNITY
End: 2021-01-01

## 2020-01-01 RX ORDER — MAGNESIUM SULFATE 1 G/100ML
1 INJECTION INTRAVENOUS ONCE
Status: COMPLETED | OUTPATIENT
Start: 2020-01-01 | End: 2020-01-01

## 2020-01-01 RX ORDER — VANCOMYCIN 2 GRAM/500 ML IN 0.9 % SODIUM CHLORIDE INTRAVENOUS
2000 ONCE
Status: COMPLETED | OUTPATIENT
Start: 2020-01-01 | End: 2020-01-01

## 2020-01-01 RX ORDER — DIPHENHYDRAMINE HCL 25 MG
25 CAPSULE ORAL ONCE
Status: ACTIVE | OUTPATIENT
Start: 2020-01-01 | End: 2020-01-01

## 2020-01-01 RX ORDER — GABAPENTIN 300 MG/1
300 CAPSULE ORAL DAILY
Qty: 30 CAP | Refills: 2 | Status: SHIPPED | OUTPATIENT
Start: 2020-01-01 | End: 2020-01-01

## 2020-01-01 RX ORDER — CEPHALEXIN 500 MG/1
500 CAPSULE ORAL 2 TIMES DAILY
Qty: 60 CAP | Refills: 0 | Status: SHIPPED | OUTPATIENT
Start: 2020-01-01 | End: 2020-01-01 | Stop reason: SDUPTHER

## 2020-01-01 RX ORDER — HYDRALAZINE HYDROCHLORIDE 20 MG/ML
10 INJECTION INTRAMUSCULAR; INTRAVENOUS EVERY 6 HOURS
Status: DISCONTINUED | OUTPATIENT
Start: 2020-01-01 | End: 2020-01-01

## 2020-01-01 RX ORDER — INSULIN GLARGINE 100 [IU]/ML
40 INJECTION, SOLUTION SUBCUTANEOUS
Status: DISCONTINUED | OUTPATIENT
Start: 2020-01-01 | End: 2020-01-01

## 2020-01-01 RX ORDER — SODIUM CHLORIDE 0.9 % (FLUSH) 0.9 %
5-40 SYRINGE (ML) INJECTION EVERY 8 HOURS
Status: DISCONTINUED | OUTPATIENT
Start: 2020-01-01 | End: 2020-01-01 | Stop reason: HOSPADM

## 2020-01-01 RX ORDER — LANOLIN ALCOHOL/MO/W.PET/CERES
400 CREAM (GRAM) TOPICAL DAILY
Qty: 30 TAB | Refills: 5
Start: 2020-01-01 | End: 2021-01-01 | Stop reason: ALTCHOICE

## 2020-01-01 RX ORDER — PAROXETINE HYDROCHLORIDE 20 MG/1
20 TABLET, FILM COATED ORAL DAILY
Status: DISCONTINUED | OUTPATIENT
Start: 2020-01-01 | End: 2020-01-01 | Stop reason: HOSPADM

## 2020-01-01 RX ORDER — PAROXETINE 30 MG/1
30 TABLET, FILM COATED ORAL DAILY
Status: ON HOLD | COMMUNITY
End: 2020-01-01 | Stop reason: SDUPTHER

## 2020-01-01 RX ORDER — MICONAZOLE NITRATE 2 %
POWDER (GRAM) TOPICAL 2 TIMES DAILY
Status: DISCONTINUED | OUTPATIENT
Start: 2020-01-01 | End: 2020-01-01 | Stop reason: HOSPADM

## 2020-01-01 RX ORDER — WARFARIN 1 MG/1
4 TABLET ORAL
Status: COMPLETED | OUTPATIENT
Start: 2020-01-01 | End: 2020-01-01

## 2020-01-01 RX ORDER — INSULIN LISPRO 100 [IU]/ML
INJECTION, SOLUTION INTRAVENOUS; SUBCUTANEOUS
Status: DISCONTINUED | OUTPATIENT
Start: 2020-01-01 | End: 2020-01-01

## 2020-01-01 RX ORDER — FENTANYL CITRATE 50 UG/ML
INJECTION, SOLUTION INTRAMUSCULAR; INTRAVENOUS AS NEEDED
Status: DISCONTINUED | OUTPATIENT
Start: 2020-01-01 | End: 2020-01-01 | Stop reason: HOSPADM

## 2020-01-01 RX ORDER — ASPIRIN 81 MG/1
81 TABLET ORAL DAILY
Status: DISCONTINUED | OUTPATIENT
Start: 2020-01-01 | End: 2020-01-01 | Stop reason: HOSPADM

## 2020-01-01 RX ORDER — ALLOPURINOL 100 MG/1
100 TABLET ORAL DAILY
Qty: 90 TAB | Refills: 1 | Status: SHIPPED | OUTPATIENT
Start: 2020-01-01 | End: 2020-01-01

## 2020-01-01 RX ORDER — WARFARIN 1 MG/1
0.5 TABLET ORAL ONCE
Status: COMPLETED | OUTPATIENT
Start: 2020-01-01 | End: 2020-01-01

## 2020-01-01 RX ORDER — DEXTROSE MONOHYDRATE 100 MG/ML
0-250 INJECTION, SOLUTION INTRAVENOUS AS NEEDED
Status: DISCONTINUED | OUTPATIENT
Start: 2020-01-01 | End: 2020-01-01 | Stop reason: HOSPADM

## 2020-01-01 RX ORDER — VANCOMYCIN 2 GRAM/500 ML IN 0.9 % SODIUM CHLORIDE INTRAVENOUS
2000 EVERY 24 HOURS
Status: DISCONTINUED | OUTPATIENT
Start: 2020-01-01 | End: 2020-01-01

## 2020-01-01 RX ORDER — TAFAMIDIS 61 MG/1
61 CAPSULE, LIQUID FILLED ORAL DAILY
Qty: 30 CAP | Refills: 2 | Status: SHIPPED | OUTPATIENT
Start: 2020-01-01 | End: 2021-01-01 | Stop reason: ALTCHOICE

## 2020-01-01 RX ORDER — DEXTROSE MONOHYDRATE 100 MG/ML
125-250 INJECTION, SOLUTION INTRAVENOUS AS NEEDED
Status: DISCONTINUED | OUTPATIENT
Start: 2020-01-01 | End: 2020-01-01 | Stop reason: SDUPTHER

## 2020-01-01 RX ORDER — SODIUM CHLORIDE 0.9 % (FLUSH) 0.9 %
5-40 SYRINGE (ML) INJECTION EVERY 8 HOURS
Status: CANCELLED | OUTPATIENT
Start: 2020-01-01

## 2020-01-01 RX ORDER — ERGOCALCIFEROL 1.25 MG/1
50000 CAPSULE ORAL
Qty: 14 CAP | Refills: 0 | Status: SHIPPED | OUTPATIENT
Start: 2020-01-01 | End: 2020-01-01

## 2020-01-01 RX ORDER — MAGNESIUM SULFATE 100 %
4 CRYSTALS MISCELLANEOUS AS NEEDED
Status: DISCONTINUED | OUTPATIENT
Start: 2020-01-01 | End: 2020-01-01 | Stop reason: SDUPTHER

## 2020-01-01 RX ORDER — INSULIN LISPRO 100 [IU]/ML
INJECTION, SOLUTION INTRAVENOUS; SUBCUTANEOUS
Status: DISCONTINUED | OUTPATIENT
Start: 2020-01-01 | End: 2020-01-01 | Stop reason: HOSPADM

## 2020-01-01 RX ORDER — LANOLIN ALCOHOL/MO/W.PET/CERES
1 CREAM (GRAM) TOPICAL
Status: DISCONTINUED | OUTPATIENT
Start: 2020-01-01 | End: 2020-01-01 | Stop reason: HOSPADM

## 2020-01-01 RX ORDER — HYDRALAZINE HYDROCHLORIDE 10 MG/1
10 TABLET, FILM COATED ORAL 3 TIMES DAILY
Status: DISCONTINUED | OUTPATIENT
Start: 2020-01-01 | End: 2020-01-01

## 2020-01-01 RX ORDER — LANOLIN ALCOHOL/MO/W.PET/CERES
325 CREAM (GRAM) TOPICAL
COMMUNITY
End: 2020-01-01

## 2020-01-01 RX ORDER — SODIUM CHLORIDE 9 MG/ML
25 INJECTION, SOLUTION INTRAVENOUS CONTINUOUS
Status: DISCONTINUED | OUTPATIENT
Start: 2020-01-01 | End: 2020-01-01 | Stop reason: HOSPADM

## 2020-01-01 RX ORDER — LANOLIN ALCOHOL/MO/W.PET/CERES
325 CREAM (GRAM) TOPICAL
Qty: 30 TAB | Refills: 1 | Status: SHIPPED | OUTPATIENT
Start: 2020-01-01 | End: 2021-01-01

## 2020-01-01 RX ORDER — INSULIN LISPRO 100 [IU]/ML
INJECTION, SOLUTION INTRAVENOUS; SUBCUTANEOUS
Status: DISCONTINUED | OUTPATIENT
Start: 2020-01-01 | End: 2020-01-01 | Stop reason: SDUPTHER

## 2020-01-01 RX ORDER — DEXTROSE MONOHYDRATE 100 MG/ML
0-250 INJECTION, SOLUTION INTRAVENOUS AS NEEDED
Status: DISCONTINUED | OUTPATIENT
Start: 2020-01-01 | End: 2020-01-01 | Stop reason: SDUPTHER

## 2020-01-01 RX ORDER — WARFARIN 1 MG/1
2 TABLET ORAL
Status: COMPLETED | OUTPATIENT
Start: 2020-01-01 | End: 2020-01-01

## 2020-01-01 RX ORDER — BUMETANIDE 0.25 MG/ML
2 INJECTION INTRAMUSCULAR; INTRAVENOUS 2 TIMES DAILY
Status: DISCONTINUED | OUTPATIENT
Start: 2020-01-01 | End: 2020-01-01

## 2020-01-01 RX ORDER — BUMETANIDE 1 MG/1
2 TABLET ORAL DAILY
Status: DISCONTINUED | OUTPATIENT
Start: 2020-01-01 | End: 2020-01-01

## 2020-01-01 RX ORDER — ACETAMINOPHEN 500 MG
1000 TABLET ORAL ONCE
Status: COMPLETED | OUTPATIENT
Start: 2020-01-01 | End: 2020-01-01

## 2020-01-01 RX ORDER — LANOLIN ALCOHOL/MO/W.PET/CERES
325 CREAM (GRAM) TOPICAL
Status: DISCONTINUED | OUTPATIENT
Start: 2020-01-01 | End: 2020-01-01 | Stop reason: HOSPADM

## 2020-01-01 RX ORDER — ALBUTEROL SULFATE 90 UG/1
1 AEROSOL, METERED RESPIRATORY (INHALATION)
Status: DISCONTINUED | OUTPATIENT
Start: 2020-01-01 | End: 2020-01-01 | Stop reason: CLARIF

## 2020-01-01 RX ORDER — INSULIN LISPRO 100 [IU]/ML
6 INJECTION, SOLUTION INTRAVENOUS; SUBCUTANEOUS
Status: DISCONTINUED | OUTPATIENT
Start: 2020-01-01 | End: 2020-01-01 | Stop reason: HOSPADM

## 2020-01-01 RX ORDER — LEVOFLOXACIN 750 MG/1
750 TABLET ORAL EVERY OTHER DAY
Qty: 2 TAB | Refills: 0 | Status: SHIPPED | OUTPATIENT
Start: 2020-01-01 | End: 2020-01-01

## 2020-01-01 RX ORDER — AMLODIPINE BESYLATE 5 MG/1
5 TABLET ORAL DAILY
Status: DISCONTINUED | OUTPATIENT
Start: 2020-01-01 | End: 2020-01-01

## 2020-01-01 RX ORDER — WARFARIN 2.5 MG/1
2.5 TABLET ORAL ONCE
Status: COMPLETED | OUTPATIENT
Start: 2020-01-01 | End: 2020-01-01

## 2020-01-01 RX ORDER — CARVEDILOL 12.5 MG/1
12.5 TABLET ORAL 2 TIMES DAILY WITH MEALS
Status: DISCONTINUED | OUTPATIENT
Start: 2020-01-01 | End: 2020-01-01

## 2020-01-01 RX ORDER — ALBUMIN HUMAN 250 G/1000ML
12.5 SOLUTION INTRAVENOUS ONCE
Status: COMPLETED | OUTPATIENT
Start: 2020-01-01 | End: 2020-01-01

## 2020-01-01 RX ORDER — HYDRALAZINE HYDROCHLORIDE 25 MG/1
25 TABLET, FILM COATED ORAL 3 TIMES DAILY
Qty: 90 TAB | Refills: 0 | Status: SHIPPED | OUTPATIENT
Start: 2020-01-01 | End: 2020-01-01

## 2020-01-01 RX ORDER — BUMETANIDE 2 MG/1
2 TABLET ORAL DAILY
Qty: 30 TAB | Refills: 2 | Status: SHIPPED | OUTPATIENT
Start: 2020-01-01 | End: 2020-01-01

## 2020-01-01 RX ORDER — BUMETANIDE 1 MG/1
2 TABLET ORAL 2 TIMES DAILY
Status: DISCONTINUED | OUTPATIENT
Start: 2020-01-01 | End: 2020-01-01

## 2020-01-01 RX ORDER — BUMETANIDE 1 MG/1
2 TABLET ORAL DAILY
Status: DISCONTINUED | OUTPATIENT
Start: 2020-01-01 | End: 2020-01-01 | Stop reason: HOSPADM

## 2020-01-01 RX ORDER — LANOLIN ALCOHOL/MO/W.PET/CERES
325 CREAM (GRAM) TOPICAL
Qty: 30 TAB | Refills: 0 | Status: SHIPPED | OUTPATIENT
Start: 2020-01-01 | End: 2020-01-01 | Stop reason: SDUPTHER

## 2020-01-01 RX ORDER — GABAPENTIN 300 MG/1
300 CAPSULE ORAL DAILY
COMMUNITY
End: 2020-01-01 | Stop reason: SDUPTHER

## 2020-01-01 RX ORDER — BUMETANIDE 1 MG/1
1 TABLET ORAL DAILY
Qty: 30 TAB | Refills: 0 | Status: SHIPPED | OUTPATIENT
Start: 2020-01-01 | End: 2021-01-01 | Stop reason: SINTOL

## 2020-01-01 RX ORDER — SODIUM CHLORIDE 0.9 % (FLUSH) 0.9 %
SYRINGE (ML) INJECTION AS NEEDED
Status: DISCONTINUED | OUTPATIENT
Start: 2020-01-01 | End: 2020-01-01 | Stop reason: HOSPADM

## 2020-01-01 RX ORDER — PROMETHAZINE HYDROCHLORIDE 25 MG/1
12.5 TABLET ORAL
Status: DISCONTINUED | OUTPATIENT
Start: 2020-01-01 | End: 2020-01-01 | Stop reason: HOSPADM

## 2020-01-01 RX ORDER — IPRATROPIUM BROMIDE AND ALBUTEROL SULFATE 2.5; .5 MG/3ML; MG/3ML
3 SOLUTION RESPIRATORY (INHALATION)
Status: DISCONTINUED | OUTPATIENT
Start: 2020-01-01 | End: 2020-01-01 | Stop reason: HOSPADM

## 2020-01-01 RX ORDER — BUDESONIDE AND FORMOTEROL FUMARATE DIHYDRATE 160; 4.5 UG/1; UG/1
2 AEROSOL RESPIRATORY (INHALATION)
COMMUNITY
End: 2021-01-01 | Stop reason: SDUPTHER

## 2020-01-01 RX ORDER — MEXILETINE HYDROCHLORIDE 150 MG/1
150 CAPSULE ORAL 3 TIMES DAILY
Qty: 90 CAP | Refills: 0 | Status: SHIPPED | OUTPATIENT
Start: 2020-01-01 | End: 2020-01-01

## 2020-01-01 RX ORDER — SODIUM CHLORIDE 0.9 % (FLUSH) 0.9 %
5-40 SYRINGE (ML) INJECTION AS NEEDED
Status: CANCELLED | OUTPATIENT
Start: 2020-01-01

## 2020-01-01 RX ORDER — HEPARIN SODIUM 200 [USP'U]/100ML
INJECTION, SOLUTION INTRAVENOUS
Status: COMPLETED | OUTPATIENT
Start: 2020-01-01 | End: 2020-01-01

## 2020-01-01 RX ORDER — PAROXETINE HYDROCHLORIDE 20 MG/1
30 TABLET, FILM COATED ORAL DAILY
Status: DISCONTINUED | OUTPATIENT
Start: 2020-01-01 | End: 2020-01-01 | Stop reason: HOSPADM

## 2020-01-01 RX ORDER — LANOLIN ALCOHOL/MO/W.PET/CERES
400 CREAM (GRAM) TOPICAL 2 TIMES DAILY
Status: DISCONTINUED | OUTPATIENT
Start: 2020-01-01 | End: 2020-01-01

## 2020-01-01 RX ORDER — SODIUM CHLORIDE 9 MG/ML
250 INJECTION, SOLUTION INTRAVENOUS AS NEEDED
Status: DISCONTINUED | OUTPATIENT
Start: 2020-01-01 | End: 2020-01-01

## 2020-01-01 RX ORDER — DEXTROSE 50 % IN WATER (D50W) INTRAVENOUS SYRINGE
12.5-25 AS NEEDED
Status: DISCONTINUED | OUTPATIENT
Start: 2020-01-01 | End: 2020-01-01 | Stop reason: CLARIF

## 2020-01-01 RX ORDER — AMOXICILLIN AND CLAVULANATE POTASSIUM 875; 125 MG/1; MG/1
1 TABLET, FILM COATED ORAL 2 TIMES DAILY
Status: DISCONTINUED | OUTPATIENT
Start: 2020-01-01 | End: 2020-01-01

## 2020-01-01 RX ORDER — AMOXICILLIN AND CLAVULANATE POTASSIUM 875; 125 MG/1; MG/1
1 TABLET, FILM COATED ORAL 2 TIMES DAILY
Qty: 60 TAB | Refills: 2 | Status: ON HOLD | OUTPATIENT
Start: 2020-01-01 | End: 2020-01-01

## 2020-01-01 RX ADMIN — MICONAZOLE NITRATE 2 % TOPICAL POWDER: at 10:56

## 2020-01-01 RX ADMIN — FERROUS SULFATE TAB 325 MG (65 MG ELEMENTAL FE) 325 MG: 325 (65 FE) TAB at 08:23

## 2020-01-01 RX ADMIN — Medication 10 ML: at 06:27

## 2020-01-01 RX ADMIN — CARVEDILOL 25 MG: 12.5 TABLET, FILM COATED ORAL at 08:16

## 2020-01-01 RX ADMIN — Medication 10 ML: at 15:14

## 2020-01-01 RX ADMIN — AMLODIPINE BESYLATE 2.5 MG: 5 TABLET ORAL at 17:52

## 2020-01-01 RX ADMIN — INSULIN LISPRO 2 UNITS: 100 INJECTION, SOLUTION INTRAVENOUS; SUBCUTANEOUS at 12:54

## 2020-01-01 RX ADMIN — PAROXETINE HYDROCHLORIDE 30 MG: 20 TABLET, FILM COATED ORAL at 09:02

## 2020-01-01 RX ADMIN — INSULIN LISPRO 2 UNITS: 100 INJECTION, SOLUTION INTRAVENOUS; SUBCUTANEOUS at 12:18

## 2020-01-01 RX ADMIN — DOCUSATE SODIUM 100 MG: 100 CAPSULE, LIQUID FILLED ORAL at 09:45

## 2020-01-01 RX ADMIN — Medication 10 ML: at 15:54

## 2020-01-01 RX ADMIN — CALCIUM GLUCONATE 1 G: 98 INJECTION, SOLUTION INTRAVENOUS at 23:49

## 2020-01-01 RX ADMIN — AMLODIPINE BESYLATE 2.5 MG: 5 TABLET ORAL at 08:16

## 2020-01-01 RX ADMIN — INSULIN LISPRO 2 UNITS: 100 INJECTION, SOLUTION INTRAVENOUS; SUBCUTANEOUS at 17:44

## 2020-01-01 RX ADMIN — BUMETANIDE 2 MG: 1 TABLET ORAL at 08:52

## 2020-01-01 RX ADMIN — BUMETANIDE 2 MG: 1 TABLET ORAL at 09:20

## 2020-01-01 RX ADMIN — FERROUS SULFATE TAB 325 MG (65 MG ELEMENTAL FE) 325 MG: 325 (65 FE) TAB at 09:45

## 2020-01-01 RX ADMIN — Medication: at 17:39

## 2020-01-01 RX ADMIN — Medication 10 ML: at 06:47

## 2020-01-01 RX ADMIN — BUMETANIDE 2 MG: 1 TABLET ORAL at 17:34

## 2020-01-01 RX ADMIN — Medication 10 ML: at 07:09

## 2020-01-01 RX ADMIN — DOCUSATE SODIUM 100 MG: 100 CAPSULE, LIQUID FILLED ORAL at 08:09

## 2020-01-01 RX ADMIN — Medication 10 ML: at 15:58

## 2020-01-01 RX ADMIN — AMLODIPINE BESYLATE 2.5 MG: 5 TABLET ORAL at 09:16

## 2020-01-01 RX ADMIN — PAROXETINE HYDROCHLORIDE 30 MG: 20 TABLET, FILM COATED ORAL at 09:13

## 2020-01-01 RX ADMIN — IRON SUCROSE 200 MG: 20 INJECTION, SOLUTION INTRAVENOUS at 18:24

## 2020-01-01 RX ADMIN — WARFARIN SODIUM 4 MG: 4 TABLET ORAL at 17:59

## 2020-01-01 RX ADMIN — Medication: at 17:29

## 2020-01-01 RX ADMIN — HUMAN INSULIN 10 UNITS: 100 INJECTION, SUSPENSION SUBCUTANEOUS at 09:23

## 2020-01-01 RX ADMIN — BUMETANIDE 1 MG: 1 TABLET ORAL at 08:46

## 2020-01-01 RX ADMIN — AMLODIPINE BESYLATE 2.5 MG: 5 TABLET ORAL at 17:25

## 2020-01-01 RX ADMIN — Medication 10 ML: at 23:55

## 2020-01-01 RX ADMIN — FERROUS SULFATE TAB 325 MG (65 MG ELEMENTAL FE) 325 MG: 325 (65 FE) TAB at 09:20

## 2020-01-01 RX ADMIN — BUMETANIDE 2 MG: 0.25 INJECTION INTRAMUSCULAR; INTRAVENOUS at 17:21

## 2020-01-01 RX ADMIN — CEFEPIME HYDROCHLORIDE 2 G: 2 INJECTION, POWDER, FOR SOLUTION INTRAVENOUS at 17:38

## 2020-01-01 RX ADMIN — ASPIRIN 81 MG: 81 TABLET, COATED ORAL at 08:53

## 2020-01-01 RX ADMIN — CARVEDILOL 25 MG: 12.5 TABLET, FILM COATED ORAL at 09:20

## 2020-01-01 RX ADMIN — AMLODIPINE BESYLATE 2.5 MG: 5 TABLET ORAL at 08:52

## 2020-01-01 RX ADMIN — WARFARIN SODIUM 4 MG: 4 TABLET ORAL at 17:24

## 2020-01-01 RX ADMIN — CEFEPIME HYDROCHLORIDE 2 G: 2 INJECTION, POWDER, FOR SOLUTION INTRAVENOUS at 14:20

## 2020-01-01 RX ADMIN — CARVEDILOL 25 MG: 12.5 TABLET, FILM COATED ORAL at 09:12

## 2020-01-01 RX ADMIN — DAPTOMYCIN 550 MG: 500 INJECTION, POWDER, LYOPHILIZED, FOR SOLUTION INTRAVENOUS at 15:00

## 2020-01-01 RX ADMIN — Medication: at 08:53

## 2020-01-01 RX ADMIN — BUMETANIDE 2 MG: 0.25 INJECTION INTRAMUSCULAR; INTRAVENOUS at 08:23

## 2020-01-01 RX ADMIN — CARVEDILOL 25 MG: 12.5 TABLET, FILM COATED ORAL at 17:34

## 2020-01-01 RX ADMIN — Medication 10 ML: at 06:29

## 2020-01-01 RX ADMIN — CEFEPIME HYDROCHLORIDE 2 G: 2 INJECTION, POWDER, FOR SOLUTION INTRAVENOUS at 15:33

## 2020-01-01 RX ADMIN — RANOLAZINE 1000 MG: 500 TABLET, FILM COATED, EXTENDED RELEASE ORAL at 22:01

## 2020-01-01 RX ADMIN — GABAPENTIN 300 MG: 300 CAPSULE ORAL at 09:20

## 2020-01-01 RX ADMIN — WARFARIN SODIUM 2 MG: 1 TABLET ORAL at 18:22

## 2020-01-01 RX ADMIN — INSULIN LISPRO 2 UNITS: 100 INJECTION, SOLUTION INTRAVENOUS; SUBCUTANEOUS at 18:11

## 2020-01-01 RX ADMIN — MEXILETINE HYDROCHLORIDE 150 MG: 150 CAPSULE ORAL at 09:07

## 2020-01-01 RX ADMIN — ACETAMINOPHEN 650 MG: 325 TABLET ORAL at 13:55

## 2020-01-01 RX ADMIN — Medication 10 ML: at 15:00

## 2020-01-01 RX ADMIN — SACUBITRIL AND VALSARTAN 1 TABLET: 97; 103 TABLET, FILM COATED ORAL at 10:15

## 2020-01-01 RX ADMIN — SACUBITRIL AND VALSARTAN 1 TABLET: 97; 103 TABLET, FILM COATED ORAL at 09:10

## 2020-01-01 RX ADMIN — CEPHALEXIN 250 MG: 250 CAPSULE ORAL at 08:09

## 2020-01-01 RX ADMIN — DOCUSATE SODIUM 100 MG: 100 CAPSULE, LIQUID FILLED ORAL at 17:24

## 2020-01-01 RX ADMIN — Medication 10 ML: at 16:18

## 2020-01-01 RX ADMIN — INSULIN LISPRO 2 UNITS: 100 INJECTION, SOLUTION INTRAVENOUS; SUBCUTANEOUS at 17:39

## 2020-01-01 RX ADMIN — INSULIN LISPRO 2 UNITS: 100 INJECTION, SOLUTION INTRAVENOUS; SUBCUTANEOUS at 06:30

## 2020-01-01 RX ADMIN — FERROUS SULFATE TAB 325 MG (65 MG ELEMENTAL FE) 325 MG: 325 (65 FE) TAB at 10:17

## 2020-01-01 RX ADMIN — Medication 10 ML: at 06:34

## 2020-01-01 RX ADMIN — INSULIN LISPRO 6 UNITS: 100 INJECTION, SOLUTION INTRAVENOUS; SUBCUTANEOUS at 12:31

## 2020-01-01 RX ADMIN — HYDRALAZINE HYDROCHLORIDE 25 MG: 25 TABLET, FILM COATED ORAL at 15:26

## 2020-01-01 RX ADMIN — ALBUMIN (HUMAN) 12.5 G: 12.5 INJECTION, SOLUTION INTRAVENOUS at 10:00

## 2020-01-01 RX ADMIN — HUMAN INSULIN 20 UNITS: 100 INJECTION, SUSPENSION SUBCUTANEOUS at 09:19

## 2020-01-01 RX ADMIN — Medication 10 ML: at 16:14

## 2020-01-01 RX ADMIN — PIPERACILLIN AND TAZOBACTAM 3.38 G: 3; .375 INJECTION, POWDER, LYOPHILIZED, FOR SOLUTION INTRAVENOUS at 23:00

## 2020-01-01 RX ADMIN — INSULIN LISPRO 2 UNITS: 100 INJECTION, SOLUTION INTRAVENOUS; SUBCUTANEOUS at 07:11

## 2020-01-01 RX ADMIN — FERROUS SULFATE TAB 325 MG (65 MG ELEMENTAL FE) 325 MG: 325 (65 FE) TAB at 08:56

## 2020-01-01 RX ADMIN — HYDRALAZINE HYDROCHLORIDE 25 MG: 25 TABLET, FILM COATED ORAL at 23:06

## 2020-01-01 RX ADMIN — HUMAN INSULIN 20 UNITS: 100 INJECTION, SUSPENSION SUBCUTANEOUS at 17:25

## 2020-01-01 RX ADMIN — AMLODIPINE BESYLATE 2.5 MG: 5 TABLET ORAL at 08:22

## 2020-01-01 RX ADMIN — PAROXETINE HYDROCHLORIDE 20 MG: 20 TABLET, FILM COATED ORAL at 08:40

## 2020-01-01 RX ADMIN — INSULIN LISPRO 2 UNITS: 100 INJECTION, SOLUTION INTRAVENOUS; SUBCUTANEOUS at 12:26

## 2020-01-01 RX ADMIN — GABAPENTIN 300 MG: 300 CAPSULE ORAL at 10:15

## 2020-01-01 RX ADMIN — Medication 10 ML: at 22:25

## 2020-01-01 RX ADMIN — CEFEPIME HYDROCHLORIDE 2 G: 2 INJECTION, POWDER, FOR SOLUTION INTRAVENOUS at 10:39

## 2020-01-01 RX ADMIN — Medication 10 ML: at 05:40

## 2020-01-01 RX ADMIN — WARFARIN SODIUM 4 MG: 4 TABLET ORAL at 15:11

## 2020-01-01 RX ADMIN — INSULIN LISPRO 3 UNITS: 100 INJECTION, SOLUTION INTRAVENOUS; SUBCUTANEOUS at 06:33

## 2020-01-01 RX ADMIN — Medication 10 ML: at 21:50

## 2020-01-01 RX ADMIN — INSULIN LISPRO 3 UNITS: 100 INJECTION, SOLUTION INTRAVENOUS; SUBCUTANEOUS at 16:50

## 2020-01-01 RX ADMIN — CEFEPIME HYDROCHLORIDE 2 G: 2 INJECTION, POWDER, FOR SOLUTION INTRAVENOUS at 15:13

## 2020-01-01 RX ADMIN — Medication: at 18:09

## 2020-01-01 RX ADMIN — PAROXETINE HYDROCHLORIDE 30 MG: 20 TABLET, FILM COATED ORAL at 09:04

## 2020-01-01 RX ADMIN — DAPTOMYCIN 550 MG: 500 INJECTION, POWDER, LYOPHILIZED, FOR SOLUTION INTRAVENOUS at 12:41

## 2020-01-01 RX ADMIN — Medication 10 ML: at 21:15

## 2020-01-01 RX ADMIN — IRON SUCROSE 200 MG: 20 INJECTION, SOLUTION INTRAVENOUS at 11:21

## 2020-01-01 RX ADMIN — INSULIN LISPRO 2 UNITS: 100 INJECTION, SOLUTION INTRAVENOUS; SUBCUTANEOUS at 17:24

## 2020-01-01 RX ADMIN — Medication: at 09:00

## 2020-01-01 RX ADMIN — WARFARIN SODIUM 4 MG: 4 TABLET ORAL at 18:16

## 2020-01-01 RX ADMIN — Medication 10 ML: at 22:03

## 2020-01-01 RX ADMIN — SACUBITRIL AND VALSARTAN 1 TABLET: 97; 103 TABLET, FILM COATED ORAL at 08:22

## 2020-01-01 RX ADMIN — PAROXETINE HYDROCHLORIDE 30 MG: 20 TABLET, FILM COATED ORAL at 09:16

## 2020-01-01 RX ADMIN — Medication 10 ML: at 09:23

## 2020-01-01 RX ADMIN — FERROUS SULFATE TAB 325 MG (65 MG ELEMENTAL FE) 325 MG: 325 (65 FE) TAB at 09:05

## 2020-01-01 RX ADMIN — SACUBITRIL AND VALSARTAN 1 TABLET: 97; 103 TABLET, FILM COATED ORAL at 17:34

## 2020-01-01 RX ADMIN — CARVEDILOL 25 MG: 12.5 TABLET, FILM COATED ORAL at 18:09

## 2020-01-01 RX ADMIN — ASPIRIN 81 MG: 81 TABLET, COATED ORAL at 09:21

## 2020-01-01 RX ADMIN — ARFORMOTEROL TARTRATE: 15 SOLUTION RESPIRATORY (INHALATION) at 07:18

## 2020-01-01 RX ADMIN — DAPTOMYCIN 550 MG: 500 INJECTION, POWDER, LYOPHILIZED, FOR SOLUTION INTRAVENOUS at 16:35

## 2020-01-01 RX ADMIN — ARFORMOTEROL TARTRATE: 15 SOLUTION RESPIRATORY (INHALATION) at 19:25

## 2020-01-01 RX ADMIN — PIPERACILLIN AND TAZOBACTAM 3.38 G: 3; .375 INJECTION, POWDER, LYOPHILIZED, FOR SOLUTION INTRAVENOUS at 15:54

## 2020-01-01 RX ADMIN — Medication 10 ML: at 06:56

## 2020-01-01 RX ADMIN — PAROXETINE HYDROCHLORIDE 30 MG: 20 TABLET, FILM COATED ORAL at 08:46

## 2020-01-01 RX ADMIN — AMLODIPINE BESYLATE 2.5 MG: 5 TABLET ORAL at 17:24

## 2020-01-01 RX ADMIN — HYDRALAZINE HYDROCHLORIDE 25 MG: 25 TABLET, FILM COATED ORAL at 21:13

## 2020-01-01 RX ADMIN — PAROXETINE HYDROCHLORIDE 30 MG: 20 TABLET, FILM COATED ORAL at 09:05

## 2020-01-01 RX ADMIN — GABAPENTIN 300 MG: 300 CAPSULE ORAL at 09:01

## 2020-01-01 RX ADMIN — HYDRALAZINE HYDROCHLORIDE 25 MG: 25 TABLET, FILM COATED ORAL at 22:01

## 2020-01-01 RX ADMIN — HUMAN INSULIN 20 UNITS: 100 INJECTION, SUSPENSION SUBCUTANEOUS at 17:26

## 2020-01-01 RX ADMIN — GABAPENTIN 300 MG: 300 CAPSULE ORAL at 08:16

## 2020-01-01 RX ADMIN — INSULIN LISPRO 2 UNITS: 100 INJECTION, SOLUTION INTRAVENOUS; SUBCUTANEOUS at 13:03

## 2020-01-01 RX ADMIN — Medication: at 09:14

## 2020-01-01 RX ADMIN — INSULIN LISPRO 6 UNITS: 100 INJECTION, SOLUTION INTRAVENOUS; SUBCUTANEOUS at 17:50

## 2020-01-01 RX ADMIN — GABAPENTIN 300 MG: 300 CAPSULE ORAL at 09:04

## 2020-01-01 RX ADMIN — Medication 10 ML: at 14:23

## 2020-01-01 RX ADMIN — CEPHALEXIN 250 MG: 250 CAPSULE ORAL at 17:41

## 2020-01-01 RX ADMIN — SACUBITRIL AND VALSARTAN 1 TABLET: 97; 103 TABLET, FILM COATED ORAL at 17:25

## 2020-01-01 RX ADMIN — INSULIN LISPRO 2 UNITS: 100 INJECTION, SOLUTION INTRAVENOUS; SUBCUTANEOUS at 12:40

## 2020-01-01 RX ADMIN — HUMAN INSULIN 25 UNITS: 100 INJECTION, SUSPENSION SUBCUTANEOUS at 18:10

## 2020-01-01 RX ADMIN — SODIUM CHLORIDE 25 ML/HR: 900 INJECTION, SOLUTION INTRAVENOUS at 14:13

## 2020-01-01 RX ADMIN — CEPHALEXIN 250 MG: 250 CAPSULE ORAL at 17:24

## 2020-01-01 RX ADMIN — CEFEPIME HYDROCHLORIDE 1 G: 1 INJECTION, POWDER, FOR SOLUTION INTRAMUSCULAR; INTRAVENOUS at 11:38

## 2020-01-01 RX ADMIN — CARVEDILOL 12.5 MG: 12.5 TABLET, FILM COATED ORAL at 09:16

## 2020-01-01 RX ADMIN — INSULIN LISPRO 2 UNITS: 100 INJECTION, SOLUTION INTRAVENOUS; SUBCUTANEOUS at 22:24

## 2020-01-01 RX ADMIN — HUMAN INSULIN 20 UNITS: 100 INJECTION, SUSPENSION SUBCUTANEOUS at 17:22

## 2020-01-01 RX ADMIN — AMLODIPINE BESYLATE 2.5 MG: 5 TABLET ORAL at 08:46

## 2020-01-01 RX ADMIN — ARFORMOTEROL TARTRATE: 15 SOLUTION RESPIRATORY (INHALATION) at 08:13

## 2020-01-01 RX ADMIN — INSULIN LISPRO 2 UNITS: 100 INJECTION, SOLUTION INTRAVENOUS; SUBCUTANEOUS at 12:01

## 2020-01-01 RX ADMIN — RANOLAZINE 1000 MG: 500 TABLET, FILM COATED, EXTENDED RELEASE ORAL at 08:09

## 2020-01-01 RX ADMIN — FERROUS SULFATE TAB 325 MG (65 MG ELEMENTAL FE) 325 MG: 325 (65 FE) TAB at 09:03

## 2020-01-01 RX ADMIN — INSULIN LISPRO 2 UNITS: 100 INJECTION, SOLUTION INTRAVENOUS; SUBCUTANEOUS at 09:15

## 2020-01-01 RX ADMIN — ASPIRIN 81 MG: 81 TABLET, COATED ORAL at 09:20

## 2020-01-01 RX ADMIN — HYDRALAZINE HYDROCHLORIDE 25 MG: 25 TABLET, FILM COATED ORAL at 08:54

## 2020-01-01 RX ADMIN — INSULIN LISPRO 2 UNITS: 100 INJECTION, SOLUTION INTRAVENOUS; SUBCUTANEOUS at 07:30

## 2020-01-01 RX ADMIN — INSULIN LISPRO 6 UNITS: 100 INJECTION, SOLUTION INTRAVENOUS; SUBCUTANEOUS at 07:30

## 2020-01-01 RX ADMIN — FERROUS SULFATE TAB 325 MG (65 MG ELEMENTAL FE) 325 MG: 325 (65 FE) TAB at 08:16

## 2020-01-01 RX ADMIN — FERROUS SULFATE TAB 325 MG (65 MG ELEMENTAL FE) 325 MG: 325 (65 FE) TAB at 10:23

## 2020-01-01 RX ADMIN — RANOLAZINE 1000 MG: 500 TABLET, FILM COATED, EXTENDED RELEASE ORAL at 10:02

## 2020-01-01 RX ADMIN — INSULIN LISPRO 2 UNITS: 100 INJECTION, SOLUTION INTRAVENOUS; SUBCUTANEOUS at 11:38

## 2020-01-01 RX ADMIN — HYDRALAZINE HYDROCHLORIDE 25 MG: 25 TABLET, FILM COATED ORAL at 16:20

## 2020-01-01 RX ADMIN — AMLODIPINE BESYLATE 2.5 MG: 5 TABLET ORAL at 17:39

## 2020-01-01 RX ADMIN — PAROXETINE HYDROCHLORIDE 30 MG: 20 TABLET, FILM COATED ORAL at 08:52

## 2020-01-01 RX ADMIN — PAROXETINE HYDROCHLORIDE 30 MG: 20 TABLET, FILM COATED ORAL at 09:01

## 2020-01-01 RX ADMIN — INSULIN LISPRO 2 UNITS: 100 INJECTION, SOLUTION INTRAVENOUS; SUBCUTANEOUS at 17:26

## 2020-01-01 RX ADMIN — AMLODIPINE BESYLATE 2.5 MG: 5 TABLET ORAL at 08:56

## 2020-01-01 RX ADMIN — CARVEDILOL 25 MG: 12.5 TABLET, FILM COATED ORAL at 16:46

## 2020-01-01 RX ADMIN — HUMAN INSULIN 20 UNITS: 100 INJECTION, SUSPENSION SUBCUTANEOUS at 17:34

## 2020-01-01 RX ADMIN — ASPIRIN 81 MG: 81 TABLET, COATED ORAL at 09:11

## 2020-01-01 RX ADMIN — ACETAMINOPHEN 650 MG: 325 TABLET ORAL at 06:50

## 2020-01-01 RX ADMIN — MICONAZOLE NITRATE 2 % TOPICAL POWDER: at 09:00

## 2020-01-01 RX ADMIN — BUMETANIDE 1 MG: 1 TABLET ORAL at 08:54

## 2020-01-01 RX ADMIN — HUMAN INSULIN 20 UNITS: 100 INJECTION, SUSPENSION SUBCUTANEOUS at 08:22

## 2020-01-01 RX ADMIN — CEFEPIME HYDROCHLORIDE 2 G: 2 INJECTION, POWDER, FOR SOLUTION INTRAVENOUS at 17:48

## 2020-01-01 RX ADMIN — BUMETANIDE 1 MG: 1 TABLET ORAL at 08:29

## 2020-01-01 RX ADMIN — PAROXETINE HYDROCHLORIDE 30 MG: 20 TABLET, FILM COATED ORAL at 08:56

## 2020-01-01 RX ADMIN — HUMAN INSULIN 10 UNITS: 100 INJECTION, SOLUTION SUBCUTANEOUS at 22:58

## 2020-01-01 RX ADMIN — SODIUM CHLORIDE 2000 MG: 900 INJECTION, SOLUTION INTRAVENOUS at 04:55

## 2020-01-01 RX ADMIN — Medication 10 ML: at 22:35

## 2020-01-01 RX ADMIN — BUMETANIDE 2 MG: 1 TABLET ORAL at 09:02

## 2020-01-01 RX ADMIN — INSULIN LISPRO 6 UNITS: 100 INJECTION, SOLUTION INTRAVENOUS; SUBCUTANEOUS at 12:41

## 2020-01-01 RX ADMIN — BUMETANIDE 2 MG: 1 TABLET ORAL at 09:10

## 2020-01-01 RX ADMIN — BUMETANIDE 1.5 MG: 0.25 INJECTION, SOLUTION INTRAMUSCULAR; INTRAVENOUS at 17:37

## 2020-01-01 RX ADMIN — Medication 10 ML: at 22:39

## 2020-01-01 RX ADMIN — FERROUS SULFATE TAB 325 MG (65 MG ELEMENTAL FE) 325 MG: 325 (65 FE) TAB at 09:04

## 2020-01-01 RX ADMIN — MEXILETINE HYDROCHLORIDE 150 MG: 150 CAPSULE ORAL at 15:07

## 2020-01-01 RX ADMIN — FERROUS SULFATE TAB 325 MG (65 MG ELEMENTAL FE) 325 MG: 325 (65 FE) TAB at 08:29

## 2020-01-01 RX ADMIN — BUMETANIDE 2 MG: 1 TABLET ORAL at 17:26

## 2020-01-01 RX ADMIN — ARFORMOTEROL TARTRATE: 15 SOLUTION RESPIRATORY (INHALATION) at 09:58

## 2020-01-01 RX ADMIN — CEFEPIME 2 G: 2 INJECTION, POWDER, FOR SOLUTION INTRAVENOUS at 06:30

## 2020-01-01 RX ADMIN — CEPHALEXIN 250 MG: 250 CAPSULE ORAL at 08:29

## 2020-01-01 RX ADMIN — CARVEDILOL 25 MG: 12.5 TABLET, FILM COATED ORAL at 08:53

## 2020-01-01 RX ADMIN — GABAPENTIN 300 MG: 300 CAPSULE ORAL at 08:46

## 2020-01-01 RX ADMIN — SACUBITRIL AND VALSARTAN 1 TABLET: 97; 103 TABLET, FILM COATED ORAL at 17:50

## 2020-01-01 RX ADMIN — CEFEPIME 2 G: 2 INJECTION, POWDER, FOR SOLUTION INTRAVENOUS at 06:28

## 2020-01-01 RX ADMIN — INSULIN LISPRO 2 UNITS: 100 INJECTION, SOLUTION INTRAVENOUS; SUBCUTANEOUS at 13:08

## 2020-01-01 RX ADMIN — Medication 400 MG: at 09:03

## 2020-01-01 RX ADMIN — FERROUS SULFATE TAB 325 MG (65 MG ELEMENTAL FE) 325 MG: 325 (65 FE) TAB at 08:47

## 2020-01-01 RX ADMIN — CEPHALEXIN 250 MG: 250 CAPSULE ORAL at 17:00

## 2020-01-01 RX ADMIN — Medication 10 ML: at 21:49

## 2020-01-01 RX ADMIN — BUMETANIDE 1 MG: 1 TABLET ORAL at 08:40

## 2020-01-01 RX ADMIN — WARFARIN SODIUM 0.5 MG: 1 TABLET ORAL at 17:15

## 2020-01-01 RX ADMIN — CARVEDILOL 25 MG: 12.5 TABLET, FILM COATED ORAL at 17:26

## 2020-01-01 RX ADMIN — ARFORMOTEROL TARTRATE: 15 SOLUTION RESPIRATORY (INHALATION) at 21:51

## 2020-01-01 RX ADMIN — Medication 10 ML: at 07:00

## 2020-01-01 RX ADMIN — AMLODIPINE BESYLATE 2.5 MG: 5 TABLET ORAL at 09:02

## 2020-01-01 RX ADMIN — SACUBITRIL AND VALSARTAN 1 TABLET: 97; 103 TABLET, FILM COATED ORAL at 09:17

## 2020-01-01 RX ADMIN — MEXILETINE HYDROCHLORIDE 150 MG: 150 CAPSULE ORAL at 22:40

## 2020-01-01 RX ADMIN — WARFARIN SODIUM 4 MG: 4 TABLET ORAL at 17:49

## 2020-01-01 RX ADMIN — CARVEDILOL 12.5 MG: 12.5 TABLET, FILM COATED ORAL at 09:03

## 2020-01-01 RX ADMIN — SACUBITRIL AND VALSARTAN 1 TABLET: 97; 103 TABLET, FILM COATED ORAL at 17:26

## 2020-01-01 RX ADMIN — Medication: at 17:51

## 2020-01-01 RX ADMIN — WARFARIN SODIUM 4 MG: 4 TABLET ORAL at 18:07

## 2020-01-01 RX ADMIN — INSULIN LISPRO 3 UNITS: 100 INJECTION, SOLUTION INTRAVENOUS; SUBCUTANEOUS at 09:00

## 2020-01-01 RX ADMIN — INSULIN LISPRO 6 UNITS: 100 INJECTION, SOLUTION INTRAVENOUS; SUBCUTANEOUS at 13:41

## 2020-01-01 RX ADMIN — ASPIRIN 81 MG: 81 TABLET, COATED ORAL at 08:56

## 2020-01-01 RX ADMIN — MICONAZOLE NITRATE 2 % TOPICAL POWDER: at 17:53

## 2020-01-01 RX ADMIN — PNEUMOCOCCAL VACCINE POLYVALENT 0.5 ML
25; 25; 25; 25; 25; 25; 25; 25; 25; 25; 25; 25; 25; 25; 25; 25; 25; 25; 25; 25; 25; 25; 25 INJECTION, SOLUTION INTRAMUSCULAR; SUBCUTANEOUS at 12:22

## 2020-01-01 RX ADMIN — INSULIN LISPRO 6 UNITS: 100 INJECTION, SOLUTION INTRAVENOUS; SUBCUTANEOUS at 09:03

## 2020-01-01 RX ADMIN — Medication 10 ML: at 15:20

## 2020-01-01 RX ADMIN — Medication 400 MG: at 08:53

## 2020-01-01 RX ADMIN — INSULIN LISPRO 6 UNITS: 100 INJECTION, SOLUTION INTRAVENOUS; SUBCUTANEOUS at 08:15

## 2020-01-01 RX ADMIN — ARFORMOTEROL TARTRATE: 15 SOLUTION RESPIRATORY (INHALATION) at 20:27

## 2020-01-01 RX ADMIN — Medication 400 MG: at 17:50

## 2020-01-01 RX ADMIN — Medication 10 ML: at 21:33

## 2020-01-01 RX ADMIN — ASPIRIN 81 MG: 81 TABLET, COATED ORAL at 08:16

## 2020-01-01 RX ADMIN — INSULIN LISPRO 2 UNITS: 100 INJECTION, SOLUTION INTRAVENOUS; SUBCUTANEOUS at 06:51

## 2020-01-01 RX ADMIN — Medication 400 MG: at 18:09

## 2020-01-01 RX ADMIN — INSULIN LISPRO 2 UNITS: 100 INJECTION, SOLUTION INTRAVENOUS; SUBCUTANEOUS at 11:56

## 2020-01-01 RX ADMIN — DEXTROSE MONOHYDRATE 250 ML: 10 INJECTION, SOLUTION INTRAVENOUS at 22:59

## 2020-01-01 RX ADMIN — CARVEDILOL 12.5 MG: 12.5 TABLET, FILM COATED ORAL at 17:24

## 2020-01-01 RX ADMIN — Medication 10 ML: at 21:42

## 2020-01-01 RX ADMIN — RANOLAZINE 1000 MG: 500 TABLET, FILM COATED, EXTENDED RELEASE ORAL at 10:23

## 2020-01-01 RX ADMIN — WARFARIN SODIUM 2 MG: 1 TABLET ORAL at 10:22

## 2020-01-01 RX ADMIN — BUMETANIDE 1 MG: 1 TABLET ORAL at 11:56

## 2020-01-01 RX ADMIN — FERROUS SULFATE TAB 325 MG (65 MG ELEMENTAL FE) 325 MG: 325 (65 FE) TAB at 09:17

## 2020-01-01 RX ADMIN — HUMAN INSULIN 25 UNITS: 100 INJECTION, SUSPENSION SUBCUTANEOUS at 08:56

## 2020-01-01 RX ADMIN — CARVEDILOL 12.5 MG: 12.5 TABLET, FILM COATED ORAL at 08:47

## 2020-01-01 RX ADMIN — PAROXETINE HYDROCHLORIDE 20 MG: 20 TABLET, FILM COATED ORAL at 09:46

## 2020-01-01 RX ADMIN — HUMAN INSULIN 25 UNITS: 100 INJECTION, SUSPENSION SUBCUTANEOUS at 09:03

## 2020-01-01 RX ADMIN — RANOLAZINE 1000 MG: 500 TABLET, FILM COATED, EXTENDED RELEASE ORAL at 23:09

## 2020-01-01 RX ADMIN — CARVEDILOL 25 MG: 12.5 TABLET, FILM COATED ORAL at 09:16

## 2020-01-01 RX ADMIN — Medication 10 ML: at 23:00

## 2020-01-01 RX ADMIN — Medication 10 ML: at 17:41

## 2020-01-01 RX ADMIN — CEFEPIME 2 G: 2 INJECTION, POWDER, FOR SOLUTION INTRAVENOUS at 09:51

## 2020-01-01 RX ADMIN — FERROUS SULFATE TAB 325 MG (65 MG ELEMENTAL FE) 325 MG: 325 (65 FE) TAB at 08:54

## 2020-01-01 RX ADMIN — INSULIN LISPRO 6 UNITS: 100 INJECTION, SOLUTION INTRAVENOUS; SUBCUTANEOUS at 14:59

## 2020-01-01 RX ADMIN — RANOLAZINE 1000 MG: 500 TABLET, FILM COATED, EXTENDED RELEASE ORAL at 08:54

## 2020-01-01 RX ADMIN — Medication 10 ML: at 06:45

## 2020-01-01 RX ADMIN — SODIUM CHLORIDE 2000 MG: 900 INJECTION, SOLUTION INTRAVENOUS at 07:32

## 2020-01-01 RX ADMIN — CARVEDILOL 25 MG: 12.5 TABLET, FILM COATED ORAL at 10:23

## 2020-01-01 RX ADMIN — INSULIN LISPRO 2 UNITS: 100 INJECTION, SOLUTION INTRAVENOUS; SUBCUTANEOUS at 18:22

## 2020-01-01 RX ADMIN — Medication: at 09:17

## 2020-01-01 RX ADMIN — HYDRALAZINE HYDROCHLORIDE 10 MG: 20 INJECTION INTRAMUSCULAR; INTRAVENOUS at 16:58

## 2020-01-01 RX ADMIN — Medication 10 ML: at 16:39

## 2020-01-01 RX ADMIN — WARFARIN SODIUM 4 MG: 1 TABLET ORAL at 17:32

## 2020-01-01 RX ADMIN — HYDRALAZINE HYDROCHLORIDE 10 MG: 10 TABLET, FILM COATED ORAL at 08:56

## 2020-01-01 RX ADMIN — SACUBITRIL AND VALSARTAN 1 TABLET: 97; 103 TABLET, FILM COATED ORAL at 17:21

## 2020-01-01 RX ADMIN — Medication 10 ML: at 21:32

## 2020-01-01 RX ADMIN — CEPHALEXIN 250 MG: 250 CAPSULE ORAL at 08:40

## 2020-01-01 RX ADMIN — INSULIN LISPRO 6 UNITS: 100 INJECTION, SOLUTION INTRAVENOUS; SUBCUTANEOUS at 11:38

## 2020-01-01 RX ADMIN — PAROXETINE HYDROCHLORIDE 20 MG: 20 TABLET, FILM COATED ORAL at 08:53

## 2020-01-01 RX ADMIN — WARFARIN SODIUM 4 MG: 4 TABLET ORAL at 17:54

## 2020-01-01 RX ADMIN — INSULIN LISPRO 2 UNITS: 100 INJECTION, SOLUTION INTRAVENOUS; SUBCUTANEOUS at 22:21

## 2020-01-01 RX ADMIN — RANOLAZINE 1000 MG: 500 TABLET, FILM COATED, EXTENDED RELEASE ORAL at 21:14

## 2020-01-01 RX ADMIN — FERROUS SULFATE TAB 325 MG (65 MG ELEMENTAL FE) 325 MG: 325 (65 FE) TAB at 08:09

## 2020-01-01 RX ADMIN — GABAPENTIN 300 MG: 300 CAPSULE ORAL at 13:51

## 2020-01-01 RX ADMIN — ACETAMINOPHEN 1000 MG: 500 TABLET, FILM COATED ORAL at 03:16

## 2020-01-01 RX ADMIN — DOCUSATE SODIUM 100 MG: 100 CAPSULE, LIQUID FILLED ORAL at 13:51

## 2020-01-01 RX ADMIN — HUMAN INSULIN 20 UNITS: 100 INJECTION, SUSPENSION SUBCUTANEOUS at 10:20

## 2020-01-01 RX ADMIN — DOCUSATE SODIUM 100 MG: 100 CAPSULE, LIQUID FILLED ORAL at 08:41

## 2020-01-01 RX ADMIN — HUMAN INSULIN 25 UNITS: 100 INJECTION, SUSPENSION SUBCUTANEOUS at 08:47

## 2020-01-01 RX ADMIN — PAROXETINE HYDROCHLORIDE 30 MG: 20 TABLET, FILM COATED ORAL at 09:21

## 2020-01-01 RX ADMIN — HYDRALAZINE HYDROCHLORIDE 25 MG: 25 TABLET, FILM COATED ORAL at 08:46

## 2020-01-01 RX ADMIN — RANOLAZINE 1000 MG: 500 TABLET, FILM COATED, EXTENDED RELEASE ORAL at 21:53

## 2020-01-01 RX ADMIN — BUMETANIDE 2 MG: 1 TABLET ORAL at 17:50

## 2020-01-01 RX ADMIN — DOCUSATE SODIUM 100 MG: 100 CAPSULE, LIQUID FILLED ORAL at 17:41

## 2020-01-01 RX ADMIN — BUMETANIDE 2 MG: 0.25 INJECTION INTRAMUSCULAR; INTRAVENOUS at 09:21

## 2020-01-01 RX ADMIN — DOCUSATE SODIUM 100 MG: 100 CAPSULE, LIQUID FILLED ORAL at 08:46

## 2020-01-01 RX ADMIN — GABAPENTIN 300 MG: 300 CAPSULE ORAL at 09:03

## 2020-01-01 RX ADMIN — WARFARIN SODIUM 4 MG: 4 TABLET ORAL at 17:52

## 2020-01-01 RX ADMIN — ALBUMIN (HUMAN) 25 G: 0.25 INJECTION, SOLUTION INTRAVENOUS at 17:43

## 2020-01-01 RX ADMIN — INSULIN LISPRO 6 UNITS: 100 INJECTION, SOLUTION INTRAVENOUS; SUBCUTANEOUS at 17:25

## 2020-01-01 RX ADMIN — WARFARIN SODIUM 4 MG: 1 TABLET ORAL at 16:46

## 2020-01-01 RX ADMIN — DAPTOMYCIN 550 MG: 500 INJECTION, POWDER, LYOPHILIZED, FOR SOLUTION INTRAVENOUS at 13:09

## 2020-01-01 RX ADMIN — CEFEPIME HYDROCHLORIDE 2 G: 2 INJECTION, POWDER, FOR SOLUTION INTRAVENOUS at 17:39

## 2020-01-01 RX ADMIN — Medication 10 ML: at 15:07

## 2020-01-01 RX ADMIN — SACUBITRIL AND VALSARTAN 1 TABLET: 97; 103 TABLET, FILM COATED ORAL at 18:09

## 2020-01-01 RX ADMIN — HYDRALAZINE HYDROCHLORIDE 10 MG: 20 INJECTION INTRAMUSCULAR; INTRAVENOUS at 17:53

## 2020-01-01 RX ADMIN — Medication 10 ML: at 06:40

## 2020-01-01 RX ADMIN — INSULIN LISPRO 2 UNITS: 100 INJECTION, SOLUTION INTRAVENOUS; SUBCUTANEOUS at 12:31

## 2020-01-01 RX ADMIN — CARVEDILOL 12.5 MG: 12.5 TABLET, FILM COATED ORAL at 16:51

## 2020-01-01 RX ADMIN — Medication 10 ML: at 07:32

## 2020-01-01 RX ADMIN — ARFORMOTEROL TARTRATE: 15 SOLUTION RESPIRATORY (INHALATION) at 20:47

## 2020-01-01 RX ADMIN — SODIUM CHLORIDE 2000 MG: 900 INJECTION, SOLUTION INTRAVENOUS at 05:40

## 2020-01-01 RX ADMIN — SODIUM CHLORIDE 75 ML/HR: 900 INJECTION, SOLUTION INTRAVENOUS at 12:41

## 2020-01-01 RX ADMIN — BUMETANIDE 1 MG: 1 TABLET ORAL at 09:45

## 2020-01-01 RX ADMIN — INSULIN LISPRO 2 UNITS: 100 INJECTION, SOLUTION INTRAVENOUS; SUBCUTANEOUS at 09:01

## 2020-01-01 RX ADMIN — Medication 10 ML: at 05:10

## 2020-01-01 RX ADMIN — AMLODIPINE BESYLATE 2.5 MG: 5 TABLET ORAL at 17:50

## 2020-01-01 RX ADMIN — GABAPENTIN 300 MG: 300 CAPSULE ORAL at 02:29

## 2020-01-01 RX ADMIN — ACETAMINOPHEN 650 MG: 325 TABLET ORAL at 21:23

## 2020-01-01 RX ADMIN — ARFORMOTEROL TARTRATE: 15 SOLUTION RESPIRATORY (INHALATION) at 07:29

## 2020-01-01 RX ADMIN — INSULIN LISPRO 6 UNITS: 100 INJECTION, SOLUTION INTRAVENOUS; SUBCUTANEOUS at 18:11

## 2020-01-01 RX ADMIN — AMLODIPINE BESYLATE 2.5 MG: 5 TABLET ORAL at 18:09

## 2020-01-01 RX ADMIN — Medication 10 ML: at 13:42

## 2020-01-01 RX ADMIN — BUMETANIDE 2 MG: 1 TABLET ORAL at 08:56

## 2020-01-01 RX ADMIN — INSULIN LISPRO 3 UNITS: 100 INJECTION, SOLUTION INTRAVENOUS; SUBCUTANEOUS at 11:58

## 2020-01-01 RX ADMIN — AMLODIPINE BESYLATE 2.5 MG: 5 TABLET ORAL at 09:19

## 2020-01-01 RX ADMIN — WARFARIN SODIUM 2.5 MG: 2.5 TABLET ORAL at 17:57

## 2020-01-01 RX ADMIN — Medication 10 ML: at 07:02

## 2020-01-01 RX ADMIN — MAGNESIUM SULFATE HEPTAHYDRATE 1 G: 1 INJECTION, SOLUTION INTRAVENOUS at 10:17

## 2020-01-01 RX ADMIN — ACETAMINOPHEN 650 MG: 325 TABLET ORAL at 23:09

## 2020-01-01 RX ADMIN — HYDRALAZINE HYDROCHLORIDE 25 MG: 25 TABLET, FILM COATED ORAL at 15:58

## 2020-01-01 RX ADMIN — Medication 10 ML: at 21:13

## 2020-01-01 RX ADMIN — RANOLAZINE 1000 MG: 500 TABLET, FILM COATED, EXTENDED RELEASE ORAL at 11:39

## 2020-01-01 RX ADMIN — FERUMOXYTOL 510 MG: 510 INJECTION INTRAVENOUS at 14:16

## 2020-01-01 RX ADMIN — DOCUSATE SODIUM 100 MG: 100 CAPSULE, LIQUID FILLED ORAL at 09:05

## 2020-01-01 RX ADMIN — CARVEDILOL 25 MG: 12.5 TABLET, FILM COATED ORAL at 17:38

## 2020-01-01 RX ADMIN — BUMETANIDE 2 MG: 1 TABLET ORAL at 08:16

## 2020-01-01 RX ADMIN — IRON SUCROSE 200 MG: 20 INJECTION, SOLUTION INTRAVENOUS at 17:36

## 2020-01-01 RX ADMIN — Medication 10 ML: at 23:11

## 2020-01-01 RX ADMIN — HYDRALAZINE HYDROCHLORIDE 25 MG: 25 TABLET, FILM COATED ORAL at 09:45

## 2020-01-01 RX ADMIN — INSULIN LISPRO 2 UNITS: 100 INJECTION, SOLUTION INTRAVENOUS; SUBCUTANEOUS at 17:22

## 2020-01-01 RX ADMIN — INSULIN LISPRO 2 UNITS: 100 INJECTION, SOLUTION INTRAVENOUS; SUBCUTANEOUS at 07:42

## 2020-01-01 RX ADMIN — AMLODIPINE BESYLATE 2.5 MG: 5 TABLET ORAL at 10:17

## 2020-01-01 RX ADMIN — ARFORMOTEROL TARTRATE: 15 SOLUTION RESPIRATORY (INHALATION) at 07:40

## 2020-01-01 RX ADMIN — Medication 10 ML: at 06:57

## 2020-01-01 RX ADMIN — WARFARIN SODIUM 4 MG: 1 TABLET ORAL at 15:54

## 2020-01-01 RX ADMIN — Medication: at 17:52

## 2020-01-01 RX ADMIN — Medication 10 ML: at 22:36

## 2020-01-01 RX ADMIN — INSULIN LISPRO 2 UNITS: 100 INJECTION, SOLUTION INTRAVENOUS; SUBCUTANEOUS at 16:12

## 2020-01-01 RX ADMIN — PAROXETINE HYDROCHLORIDE 30 MG: 20 TABLET, FILM COATED ORAL at 08:22

## 2020-01-01 RX ADMIN — Medication 400 MG: at 08:23

## 2020-01-01 RX ADMIN — RANOLAZINE 500 MG: 500 TABLET, FILM COATED, EXTENDED RELEASE ORAL at 16:04

## 2020-01-01 RX ADMIN — CEPHALEXIN 250 MG: 250 CAPSULE ORAL at 08:54

## 2020-01-01 RX ADMIN — Medication 400 MG: at 08:16

## 2020-01-01 RX ADMIN — INSULIN LISPRO 2 UNITS: 100 INJECTION, SOLUTION INTRAVENOUS; SUBCUTANEOUS at 12:41

## 2020-01-01 RX ADMIN — INSULIN LISPRO 2 UNITS: 100 INJECTION, SOLUTION INTRAVENOUS; SUBCUTANEOUS at 07:01

## 2020-01-01 RX ADMIN — Medication 10 ML: at 23:27

## 2020-01-01 RX ADMIN — RANOLAZINE 1000 MG: 500 TABLET, FILM COATED, EXTENDED RELEASE ORAL at 08:29

## 2020-01-01 RX ADMIN — SACUBITRIL AND VALSARTAN 1 TABLET: 97; 103 TABLET, FILM COATED ORAL at 09:21

## 2020-01-01 RX ADMIN — Medication 10 ML: at 15:04

## 2020-01-01 RX ADMIN — ASPIRIN 81 MG: 81 TABLET, COATED ORAL at 10:18

## 2020-01-01 RX ADMIN — Medication 10 ML: at 07:16

## 2020-01-01 RX ADMIN — MEXILETINE HYDROCHLORIDE 150 MG: 150 CAPSULE ORAL at 09:06

## 2020-01-01 RX ADMIN — Medication 400 MG: at 17:25

## 2020-01-01 RX ADMIN — ARFORMOTEROL TARTRATE: 15 SOLUTION RESPIRATORY (INHALATION) at 19:33

## 2020-01-01 RX ADMIN — ASPIRIN 81 MG: 81 TABLET, COATED ORAL at 08:23

## 2020-01-01 RX ADMIN — DOCUSATE SODIUM 100 MG: 100 CAPSULE, LIQUID FILLED ORAL at 08:54

## 2020-01-01 RX ADMIN — Medication 10 ML: at 16:20

## 2020-01-01 RX ADMIN — FERROUS SULFATE TAB 325 MG (65 MG ELEMENTAL FE) 325 MG: 325 (65 FE) TAB at 08:46

## 2020-01-01 RX ADMIN — Medication 10 ML: at 23:03

## 2020-01-01 RX ADMIN — Medication 400 MG: at 09:20

## 2020-01-01 RX ADMIN — ARFORMOTEROL TARTRATE: 15 SOLUTION RESPIRATORY (INHALATION) at 10:08

## 2020-01-01 RX ADMIN — CEFEPIME HYDROCHLORIDE 2 G: 2 INJECTION, POWDER, FOR SOLUTION INTRAVENOUS at 15:24

## 2020-01-01 RX ADMIN — MEXILETINE HYDROCHLORIDE 150 MG: 150 CAPSULE ORAL at 00:24

## 2020-01-01 RX ADMIN — ERGOCALCIFEROL 50000 UNITS: 1.25 CAPSULE ORAL at 08:22

## 2020-01-01 RX ADMIN — GABAPENTIN 300 MG: 300 CAPSULE ORAL at 09:16

## 2020-01-01 RX ADMIN — CEPHALEXIN 250 MG: 250 CAPSULE ORAL at 08:46

## 2020-01-01 RX ADMIN — HUMAN INSULIN 25 UNITS: 100 INJECTION, SUSPENSION SUBCUTANEOUS at 17:52

## 2020-01-01 RX ADMIN — DOCUSATE SODIUM 100 MG: 100 CAPSULE, LIQUID FILLED ORAL at 17:37

## 2020-01-01 RX ADMIN — Medication 10 ML: at 09:22

## 2020-01-01 RX ADMIN — PAROXETINE HYDROCHLORIDE 20 MG: 20 TABLET, FILM COATED ORAL at 08:29

## 2020-01-01 RX ADMIN — Medication 10 ML: at 08:17

## 2020-01-01 RX ADMIN — PAROXETINE HYDROCHLORIDE 30 MG: 20 TABLET, FILM COATED ORAL at 09:19

## 2020-01-01 RX ADMIN — Medication 10 ML: at 15:01

## 2020-01-01 RX ADMIN — RANOLAZINE 1000 MG: 500 TABLET, FILM COATED, EXTENDED RELEASE ORAL at 08:41

## 2020-01-01 RX ADMIN — ASPIRIN 81 MG: 81 TABLET, COATED ORAL at 09:02

## 2020-01-01 RX ADMIN — SODIUM CHLORIDE 2000 MG: 900 INJECTION, SOLUTION INTRAVENOUS at 06:07

## 2020-01-01 RX ADMIN — ACETAMINOPHEN 650 MG: 325 TABLET ORAL at 15:26

## 2020-01-01 RX ADMIN — Medication: at 10:18

## 2020-01-01 RX ADMIN — ARFORMOTEROL TARTRATE: 15 SOLUTION RESPIRATORY (INHALATION) at 19:54

## 2020-01-01 RX ADMIN — HUMAN INSULIN 25 UNITS: 100 INJECTION, SUSPENSION SUBCUTANEOUS at 08:15

## 2020-01-01 RX ADMIN — HYDRALAZINE HYDROCHLORIDE 10 MG: 10 TABLET, FILM COATED ORAL at 09:24

## 2020-01-01 RX ADMIN — PIPERACILLIN AND TAZOBACTAM 3.38 G: 3; .375 INJECTION, POWDER, LYOPHILIZED, FOR SOLUTION INTRAVENOUS at 04:05

## 2020-01-01 RX ADMIN — PIPERACILLIN AND TAZOBACTAM 3.38 G: 3; .375 INJECTION, POWDER, LYOPHILIZED, FOR SOLUTION INTRAVENOUS at 07:17

## 2020-01-01 RX ADMIN — Medication 400 MG: at 17:27

## 2020-01-01 RX ADMIN — Medication 10 ML: at 16:13

## 2020-01-01 RX ADMIN — RANOLAZINE 500 MG: 500 TABLET, FILM COATED, EXTENDED RELEASE ORAL at 09:05

## 2020-01-01 RX ADMIN — HYDRALAZINE HYDROCHLORIDE 10 MG: 10 TABLET, FILM COATED ORAL at 22:31

## 2020-01-01 RX ADMIN — Medication 400 MG: at 09:11

## 2020-01-01 RX ADMIN — FERROUS SULFATE TAB 325 MG (65 MG ELEMENTAL FE) 325 MG: 325 (65 FE) TAB at 08:53

## 2020-01-01 RX ADMIN — IRON SUCROSE 200 MG: 20 INJECTION, SOLUTION INTRAVENOUS at 12:42

## 2020-01-01 RX ADMIN — DOCUSATE SODIUM 100 MG: 100 CAPSULE, LIQUID FILLED ORAL at 09:04

## 2020-01-01 RX ADMIN — WARFARIN SODIUM 2.5 MG: 2.5 TABLET ORAL at 17:24

## 2020-01-01 RX ADMIN — PAROXETINE HYDROCHLORIDE 20 MG: 20 TABLET, FILM COATED ORAL at 08:46

## 2020-01-01 RX ADMIN — Medication 10 ML: at 15:11

## 2020-01-01 RX ADMIN — GABAPENTIN 300 MG: 300 CAPSULE ORAL at 10:18

## 2020-01-01 RX ADMIN — AMLODIPINE BESYLATE 2.5 MG: 5 TABLET ORAL at 09:11

## 2020-01-01 RX ADMIN — RANOLAZINE 1000 MG: 500 TABLET, FILM COATED, EXTENDED RELEASE ORAL at 09:55

## 2020-01-01 RX ADMIN — ARFORMOTEROL TARTRATE: 15 SOLUTION RESPIRATORY (INHALATION) at 09:39

## 2020-01-01 RX ADMIN — DOCUSATE SODIUM 100 MG: 100 CAPSULE, LIQUID FILLED ORAL at 17:32

## 2020-01-01 RX ADMIN — Medication 10 ML: at 06:44

## 2020-01-01 RX ADMIN — MICONAZOLE NITRATE 2 % TOPICAL POWDER: at 17:25

## 2020-01-01 RX ADMIN — ARFORMOTEROL TARTRATE: 15 SOLUTION RESPIRATORY (INHALATION) at 07:23

## 2020-01-01 RX ADMIN — SODIUM CHLORIDE 75 ML/HR: 900 INJECTION, SOLUTION INTRAVENOUS at 08:56

## 2020-01-01 RX ADMIN — INSULIN LISPRO 2 UNITS: 100 INJECTION, SOLUTION INTRAVENOUS; SUBCUTANEOUS at 16:45

## 2020-01-01 RX ADMIN — RANOLAZINE 1000 MG: 500 TABLET, FILM COATED, EXTENDED RELEASE ORAL at 21:52

## 2020-01-01 RX ADMIN — IPRATROPIUM BROMIDE AND ALBUTEROL SULFATE 3 ML: .5; 3 SOLUTION RESPIRATORY (INHALATION) at 15:49

## 2020-01-01 RX ADMIN — CARVEDILOL 25 MG: 12.5 TABLET, FILM COATED ORAL at 17:21

## 2020-01-01 RX ADMIN — BUMETANIDE 2 MG: 1 TABLET ORAL at 10:18

## 2020-01-01 RX ADMIN — INSULIN LISPRO 2 UNITS: 100 INJECTION, SOLUTION INTRAVENOUS; SUBCUTANEOUS at 17:34

## 2020-01-01 RX ADMIN — CEPHALEXIN 250 MG: 250 CAPSULE ORAL at 09:45

## 2020-01-01 RX ADMIN — WARFARIN SODIUM 4.5 MG: 2.5 TABLET ORAL at 17:27

## 2020-01-01 RX ADMIN — ALBUMIN (HUMAN) 25 G: 0.25 INJECTION, SOLUTION INTRAVENOUS at 12:35

## 2020-01-01 RX ADMIN — GABAPENTIN 300 MG: 300 CAPSULE ORAL at 09:10

## 2020-01-01 RX ADMIN — CEFEPIME HYDROCHLORIDE 2 G: 2 INJECTION, POWDER, FOR SOLUTION INTRAVENOUS at 16:14

## 2020-01-01 RX ADMIN — CEFEPIME HYDROCHLORIDE 2 G: 2 INJECTION, POWDER, FOR SOLUTION INTRAVENOUS at 15:07

## 2020-01-01 RX ADMIN — PAROXETINE HYDROCHLORIDE 30 MG: 20 TABLET, FILM COATED ORAL at 10:18

## 2020-01-01 RX ADMIN — CEFEPIME HYDROCHLORIDE 1 G: 1 INJECTION, POWDER, FOR SOLUTION INTRAMUSCULAR; INTRAVENOUS at 10:18

## 2020-01-01 RX ADMIN — DOCUSATE SODIUM 100 MG: 100 CAPSULE, LIQUID FILLED ORAL at 17:00

## 2020-01-01 RX ADMIN — ERGOCALCIFEROL 50000 UNITS: 1.25 CAPSULE ORAL at 08:57

## 2020-01-01 RX ADMIN — SACUBITRIL AND VALSARTAN 1 TABLET: 97; 103 TABLET, FILM COATED ORAL at 08:52

## 2020-01-01 RX ADMIN — BUMETANIDE 2 MG: 1 TABLET ORAL at 09:16

## 2020-01-01 RX ADMIN — SACUBITRIL AND VALSARTAN 1 TABLET: 49; 51 TABLET, FILM COATED ORAL at 23:55

## 2020-01-01 RX ADMIN — INSULIN LISPRO 2 UNITS: 100 INJECTION, SOLUTION INTRAVENOUS; SUBCUTANEOUS at 07:40

## 2020-01-01 RX ADMIN — Medication 400 MG: at 09:16

## 2020-01-01 RX ADMIN — HYDRALAZINE HYDROCHLORIDE 25 MG: 25 TABLET, FILM COATED ORAL at 16:45

## 2020-01-01 RX ADMIN — AMLODIPINE BESYLATE 2.5 MG: 5 TABLET ORAL at 16:52

## 2020-01-01 RX ADMIN — Medication 10 ML: at 22:34

## 2020-01-01 RX ADMIN — SACUBITRIL AND VALSARTAN 1 TABLET: 49; 51 TABLET, FILM COATED ORAL at 08:16

## 2020-01-01 RX ADMIN — SODIUM BICARBONATE 50 MEQ: 84 INJECTION, SOLUTION INTRAVENOUS at 22:47

## 2020-01-01 RX ADMIN — DOCUSATE SODIUM 100 MG: 100 CAPSULE, LIQUID FILLED ORAL at 08:29

## 2020-01-01 RX ADMIN — HUMAN INSULIN 25 UNITS: 100 INJECTION, SUSPENSION SUBCUTANEOUS at 17:25

## 2020-01-01 RX ADMIN — INSULIN LISPRO 2 UNITS: 100 INJECTION, SOLUTION INTRAVENOUS; SUBCUTANEOUS at 11:30

## 2020-01-01 RX ADMIN — PAROXETINE HYDROCHLORIDE 30 MG: 20 TABLET, FILM COATED ORAL at 10:17

## 2020-01-01 RX ADMIN — GABAPENTIN 300 MG: 300 CAPSULE ORAL at 09:05

## 2020-01-01 RX ADMIN — FERROUS SULFATE TAB 325 MG (65 MG ELEMENTAL FE) 325 MG: 325 (65 FE) TAB at 09:11

## 2020-01-01 RX ADMIN — ASPIRIN 81 MG: 81 TABLET, COATED ORAL at 08:47

## 2020-01-01 RX ADMIN — GABAPENTIN 300 MG: 300 CAPSULE ORAL at 08:52

## 2020-01-01 RX ADMIN — MEXILETINE HYDROCHLORIDE 150 MG: 150 CAPSULE ORAL at 17:24

## 2020-01-01 RX ADMIN — ARFORMOTEROL TARTRATE: 15 SOLUTION RESPIRATORY (INHALATION) at 07:52

## 2020-01-01 RX ADMIN — CARVEDILOL 25 MG: 12.5 TABLET, FILM COATED ORAL at 16:50

## 2020-01-01 RX ADMIN — INSULIN LISPRO 2 UNITS: 100 INJECTION, SOLUTION INTRAVENOUS; SUBCUTANEOUS at 12:36

## 2020-01-01 RX ADMIN — CARVEDILOL 12.5 MG: 12.5 TABLET, FILM COATED ORAL at 08:56

## 2020-01-01 RX ADMIN — Medication 10 ML: at 18:24

## 2020-01-01 RX ADMIN — INSULIN LISPRO 6 UNITS: 100 INJECTION, SOLUTION INTRAVENOUS; SUBCUTANEOUS at 17:52

## 2020-01-01 RX ADMIN — Medication 20 ML: at 22:07

## 2020-01-01 RX ADMIN — ASPIRIN 81 MG: 81 TABLET, COATED ORAL at 09:17

## 2020-01-01 RX ADMIN — Medication 400 MG: at 10:17

## 2020-01-01 RX ADMIN — HUMAN INSULIN 25 UNITS: 100 INJECTION, SUSPENSION SUBCUTANEOUS at 17:50

## 2020-01-01 RX ADMIN — Medication 20 ML: at 05:26

## 2020-01-01 RX ADMIN — INSULIN LISPRO 2 UNITS: 100 INJECTION, SOLUTION INTRAVENOUS; SUBCUTANEOUS at 12:48

## 2020-01-01 RX ADMIN — Medication 400 MG: at 08:56

## 2020-01-01 RX ADMIN — INFLUENZA VIRUS VACCINE 0.5 ML: 15; 15; 15; 15 SUSPENSION INTRAMUSCULAR at 12:46

## 2020-01-01 RX ADMIN — HUMAN INSULIN 25 UNITS: 100 INJECTION, SUSPENSION SUBCUTANEOUS at 17:38

## 2020-01-01 RX ADMIN — Medication: at 17:26

## 2020-01-01 RX ADMIN — CARVEDILOL 25 MG: 12.5 TABLET, FILM COATED ORAL at 08:23

## 2020-01-01 RX ADMIN — HUMAN INSULIN 20 UNITS: 100 INJECTION, SUSPENSION SUBCUTANEOUS at 17:49

## 2020-01-01 RX ADMIN — PAROXETINE HYDROCHLORIDE 20 MG: 20 TABLET, FILM COATED ORAL at 08:09

## 2020-01-01 RX ADMIN — FERROUS SULFATE TAB 325 MG (65 MG ELEMENTAL FE) 325 MG: 325 (65 FE) TAB at 08:40

## 2020-01-01 RX ADMIN — INSULIN LISPRO 6 UNITS: 100 INJECTION, SOLUTION INTRAVENOUS; SUBCUTANEOUS at 17:38

## 2020-01-01 RX ADMIN — WARFARIN SODIUM 4 MG: 1 TABLET ORAL at 16:50

## 2020-01-01 RX ADMIN — RANOLAZINE 500 MG: 500 TABLET, FILM COATED, EXTENDED RELEASE ORAL at 17:15

## 2020-01-01 RX ADMIN — ARFORMOTEROL TARTRATE: 15 SOLUTION RESPIRATORY (INHALATION) at 21:09

## 2020-01-01 RX ADMIN — HYDRALAZINE HYDROCHLORIDE 10 MG: 20 INJECTION INTRAMUSCULAR; INTRAVENOUS at 02:21

## 2020-01-01 RX ADMIN — GABAPENTIN 300 MG: 300 CAPSULE ORAL at 08:22

## 2020-01-01 RX ADMIN — ALBUMIN (HUMAN) 12.5 G: 0.25 INJECTION, SOLUTION INTRAVENOUS at 11:54

## 2020-01-01 RX ADMIN — INSULIN LISPRO 2 UNITS: 100 INJECTION, SOLUTION INTRAVENOUS; SUBCUTANEOUS at 12:32

## 2020-01-01 RX ADMIN — Medication 10 ML: at 06:08

## 2020-01-01 RX ADMIN — CARVEDILOL 12.5 MG: 12.5 TABLET, FILM COATED ORAL at 17:52

## 2020-01-01 RX ADMIN — FERROUS SULFATE TAB 325 MG (65 MG ELEMENTAL FE) 325 MG: 325 (65 FE) TAB at 09:01

## 2020-01-01 RX ADMIN — PAROXETINE HYDROCHLORIDE 30 MG: 20 TABLET, FILM COATED ORAL at 08:16

## 2020-01-01 RX ADMIN — Medication 10 ML: at 21:52

## 2020-01-01 RX ADMIN — HUMAN INSULIN 20 UNITS: 100 INJECTION, SUSPENSION SUBCUTANEOUS at 09:17

## 2020-01-01 RX ADMIN — CEPHALEXIN 250 MG: 250 CAPSULE ORAL at 17:32

## 2020-01-01 RX ADMIN — Medication 10 ML: at 09:18

## 2020-01-01 RX ADMIN — INSULIN LISPRO 5 UNITS: 100 INJECTION, SOLUTION INTRAVENOUS; SUBCUTANEOUS at 12:44

## 2020-01-01 RX ADMIN — RANOLAZINE 1000 MG: 500 TABLET, FILM COATED, EXTENDED RELEASE ORAL at 22:22

## 2020-01-01 RX ADMIN — SACUBITRIL AND VALSARTAN 1 TABLET: 97; 103 TABLET, FILM COATED ORAL at 09:20

## 2020-01-01 RX ADMIN — HUMAN INSULIN 20 UNITS: 100 INJECTION, SUSPENSION SUBCUTANEOUS at 09:14

## 2020-09-01 ENCOUNTER — TELEPHONE (OUTPATIENT)
Dept: CARDIOLOGY CLINIC | Age: 70
End: 2020-09-01

## 2020-09-01 NOTE — TELEPHONE ENCOUNTER
Patient's daughter, Tracey Su called. She requested to speak to Jeffery Penn. I informed her that Jeffery Penn wasn't in the office today, but should be back in tomorrow. She states she was expecting a call back from our LVAD coordinator but wasn't sure of her name. She states she had not heard back and would like an update if her mom can be transferred to our clinic to be seen as an LVAD patient.   Patient is currently an LVAD patient with [facility].    Her call back number is [phone]

## 2020-09-01 NOTE — TELEPHONE ENCOUNTER
Called and spoke with patient's daughter. Noelle Garnett verbalized understanding that we will discuss her mother's case at [facility] on Friday.

## 2020-09-10 NOTE — TELEPHONE ENCOUNTER
New VAD patient transferring from [facility].    Left voice message for patients daughter [facility] regarding appointment on Thursday October 1st at 1pm.

## 2020-10-01 PROBLEM — E66.01 OBESITY, MORBID (HCC): Status: ACTIVE | Noted: 2020-01-01

## 2020-10-01 NOTE — PATIENT INSTRUCTIONS
No Medication changes: 
 
 
Testing Ordered: 
Labs were drawn in clinic. We will call with any abnormal results. EKG - Completed in clinic. Echo - We will schedule for 2 hours before your next appointment. ICD interrogation - completed in clinic. Other Recommendations:  
Office - [phone] [address] - [phone] LVAD Emergency pager - [phone] Wound Vac removed from Sternal Wound. Wound lightly packed and covered. Continue to change drive line exit site dressing 3 times a week using sterile technique, Ensure you are drinking an adequate amount of water with a goal of 6-8 eight ounce glasses (1.5-2 liters) of fluid daily. Your urine should be clear and light yellow straw colored. Follow up in 2- 4 weeks with Jalil Ward Please bring your daily sheets and medications to your next appointment. Please monitor your weights daily upon waking and after using the bathroom. Keep a written records of your weights and bring to your next appointment. If you have a weight gain of 3 or more pounds overnight OR 5 or more pounds in one week please contact our office. Thank you for allowing us the privilege of being a part of your healthcare team! Please do not hesitate to contact our office at [phone] with any questions or concerns.

## 2020-10-01 NOTE — PROGRESS NOTES
[address] [facility] in [place], [facility] Heart Failure Outpatient Clinic Note Patient name: Ezequiel Amin Patient : 1950 Patient MRN: [id number] Date of service: 10/01/20 Primary care physician: Carlos Alberto Madrigal NP 
Primary [facility] cardiologist: Chance Cronin MD 
 
CHIEF COMPLAINT: 
Chronic systolic heart failure PLAN: 
Continue current device speed Continue current medical therapy for heart failure Continue current dose of coreg 25mg twice daily Decrease entresto to 49/51mg twice daily Decrease norvasc to 5mg daily Review records why not on spironolactone Continue current dose of diuretic Continue mg 400mg daily Not on allopurinol or uloric, check uric acid level Continue synthroid, check TSH Chronic anticoagulation with coumadin, check INR Stop oral iron, check level and order venofer Continue baby ASA Patient not taking statin, check lipid profile, CPK and LFT 
ICD interrogation today and establish care with EP cardiologist at [facility] Continue CPAP for JED Schedule echocardiogram and EKG Labs: CBC, BMP, LFT, pro-NT-BNP, iron profile, thyroid profile, vitamin D level, lipid profile, CPK, SFC, SPEP/IF, UA with culture, LDH Will provide referral to nutritionist 
Reinforced low salt diet Reinforced fluid restriction to 6 x 8oz glasses per day Provided educational materials \"NUNGQH with heart failure\" Provided advanced care plan forms to be filled out Referral to urogynecologist 
Referral to Dr. Natalie Munoz re: sternal wound infection Follow-up with EP cardiologist 
Patient to establish PCP Recommend flu and pneumonia vaccine Return to [facility] Clinic in one month with NP/MD and  consultation (intake) IMPRESSION: 
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 Eddie Dumont at [facility]) · C/b delayed extubation due to severe COPD 
· C/b critical illness polyneuropathy · C/b prolonged hospitalization post-LVAD, 55 days · C/b sternal wound infection s/p debridement (by Dr. Eddie Dumont) s/p wound vac · C/b sacral ulcer · Would culture positive for Staph aureus, not MRSA · C/b LVAD site drainage, improved S/p CRT-ICD · ICD fired due to electrolyte imbalance () H/o breast cancer () · s/p bilateral mastectomy/chemo and endometrial cancer s/p hysterectomy · Lymphedema of LLE due to cancer treatment Severe COPD with FEV1 50% Depression Atrial fibrillation H/o \"two mini strokes\" S/p fall with hip facture · Right hip hemmiarthroplasty (18) by Dr. Thomas Ortega) · S/p removed hip hardwarare due to pain (4/15/19) COPD severe CKD, stage 3 Pulmonary hypertension Morbid obesity, BMI 45 DM2 insulin dependent Urinary incontinence, severe · no procedures due to anticoagulation · conservative management with Detrol 4 pills bid Endometrial cancer () HTN 
HL 
JED CARDIAC IMAGING: 
Echo (19) LVEF 20-25%, AV opens 1:1, no AR Echo (18) LVEF 10-%, ramps study done, report in [facility] Echo (18) ramp study done, LVEDD 7.1cm C (18) 2 vessel disease with 90% OM, 80% PDA, DSA to PDA branch HEMODYNAMICS: 
RHC not done CPEST not done 6MW not done OTHER IMAGING: 
EGD/C-scope (19) no active bleeding and polyp removed. FAMILY HISTORY: 
Mother  76 years, diagnosed with DM, HTN, CAD Father  [de-identified]years old, HTN, CAD, MI 
Positive for DM and heart disease in the family, brother with valve replacement SOCIAL HISTORY: 
Never alcohol, never smoked, , lives alone, no illicit drug use, lives in house, ambulatory status indpendedn, children: daughter, occupation retired Lives alone. ALLERGIES: benzodiazepines and quetiapine fumerate (lethargy, resp failure to both) HISTORY OF PRESENT ILLNESS: 
I had the pleasure of seeing Miguel Nguyen in [address] [facility] at [address] [facility] in [place]. Briefly, Miguel Nguyen is a 78 y.o. female with multiple comorbidities listed above presents to our clinic requesting transfer of care from [facility] LVAD program. 
 
INTERVAL HISTORY: 
Today, patient presents for initial clinic visit accompanied by her daughter. Patient is doing fairly well. She is content with her quality of life despite many medical problems. She enjoys meeting friends and going out to mall. Patient walked to our clinic with a walker for stability. Patient can walk short distance without dyspnea. Patient feels quite tired recently, which is new. Her MAP in clinic is 77. Patient can perform home activities without problem and routinely participates in daily walking for more than 15 minutes. Patient would like to remain independent as long as she can; she lives alone. Patient denies symptoms of volume overload or leg edema. Patient denies abdominal bloating or change of appetite. Patient's weight remained stable. Patient denies orthopnea, PND or nocturia. Sleeps in recliner. Patient denies irregular heart rate or palpitations. ICD has not fired this year. Patient denies other cardiac symptoms such as chest pain or leg pain with walking. Patient is compliant with fluid restriction and taking medications as prescribed. Patient manages his own medications. REVIEW OF SYSTEMS: 
General: Denies fever, night sweats. Ambulating with walker. Ear, nose and throat: Denies difficulty hearing, sinus problems, runny nose, post-nasal drip, ringing in ears, mouth sores, loose teeth, ear pain, nosebleeds, sore throate, facial pain or numbess Cardiovascular: see above in the interval history Respiratory: Denies cough, wheezing, sputum production, hemoptysis. Dyspnea at times. Gastrointestinal: Denies heartburn, constipation, intolerance to certain foods, diarrhea, abdominal pain, nausea, vomiting, difficulty swallowing, blood in stool Kidney and bladder: Denies painful urination, frequent urination, urgency, prostate problems and impotence, urinary incontinence Musculoskeletal: Denies joint pain, muscle weakness Skin and hair: Denies change in existing skin lesions, hair loss or increase, breast changes chronic skin discoloration. PHYSICAL EXAM: 
Visit Vitals BP (!) 66/0 (BP 1 Location: Right arm, BP Patient Position: Sitting) Pulse 68 Temp 98.1 °F (36.7 °C) (Oral) Resp 24 Ht 5' 7\" (1.702 m) Wt 289 lb (131.1 kg) SpO2 98% BMI 45.26 kg/m² LVAD Pump Speed (RPM): 9400 Pump Flow (LPM): 4.6 PI (Pulsitility Index): 7.2 Power: 5.5 Outpatient: Yes Testing Alarms Reviewed: Yes 
Back up SC speed: 9400 Back up Low Speed Limit: 9000 General: Patient is well developed, well-nourished in no acute distress HEENT: Normocephalic and atraumatic. No scleral icterus. Pupils are equal, round and reactive to light and accomodation. No conjunctival injection. Oropharynx is clear. Neck: Supple. No evidence of thyroid enlargements or lymphadenopathy. JVD: Cannot be appreciated Lungs: Breath sounds are equal and clear bilaterally. No wheezes, rhonchi, or rales. Heart: Regular rate and rhythm with normal S1 and S2. No murmurs, gallops or rubs. Abdomen: Soft, no mass or tenderness. No organomegaly or hernia. Bowel sounds present. Genitourinary and rectal: deferred Extremities: No cyanosis, clubbing, or edema. Neurologic: No focal sensory or motor deficits are noted. Grossly intact. Psychiatric: Awake, alert an doriented x 3. Appropriate mood and affect. Skin: Warm, dry and well perfused. No lesions, nodules or rashes are noted.  
 
PAST MEDICAL HISTORY: 
Past Medical History:  
Diagnosis Date  Asthma  Cancer ([facility] breast  
 Cancer [facility])   
 endometrial  
 Congestive heart failure, unspecified ([facility]  CRI (chronic renal insufficiency)  Depression  [facility]  Hypertension PAST SURGICAL HISTORY: 
Past Surgical History:  
Procedure Laterality Date  HX HERNIA REPAIR    
 HX HYSTERECTOMY  HX MASTECTOMY FAMILY HISTORY: 
No family history on file. SOCIAL HISTORY: 
Social History Socioeconomic History  Marital status:  Spouse name: Not on file  Number of children: Not on file  Years of education: Not on file  Highest education level: Not on file LABORATORY RESULTS: 
No flowsheet data found. ALLERGY: 
Allergies no known allergies CURRENT MEDICATIONS: 
 
Current Outpatient Medications:  
  valsartan-hydrochlorothiazide (DIOVAN HCT) 80-12.5 mg per tablet, TAKE ONE TABLET BY MOUTH ONCE A DAY, Disp: 30 Tab, Rfl: 6 
  Insulin Lisp & Lisp Prot, Hum, (HUMALOG MIX 75-25 KWIKPEN) 100 unit/mL (75-25) flexpen, INJECT 70 UNITS WITH BREAKFAST AND 80 UNITS WITH DINNER, Disp: 45 mL, Rfl: 6 
  digoxin (LANOXIN) 0.25 mg tablet, Take  by mouth daily. , Disp: , Rfl:  
  furosemide (LASIX) 40 mg tablet, Take  by mouth daily. , Disp: , Rfl:  
  carvedilol (COREG) 12.5 mg tablet, Take  by mouth two (2) times daily (with meals). , Disp: , Rfl:  
  glyBURIDE (DIABETA) 2.5 mg tablet, Take 5 mg by mouth daily (before breakfast). , Disp: , Rfl:  
  sitagliptin (JANUVIA) 100 mg tablet, Take 100 mg by mouth daily. , Disp: , Rfl:  
  aspirin 81 mg tablet, Take  by mouth., Disp: , Rfl:  
  PYRIDOXINE HCL (PYRIDOXINE PO), Take  by mouth., Disp: , Rfl:  
 
Thank you for your referral and allowing me to participate in this patient's care. Gareth Fernandez MD PhD 
[facility] [address], [address] Phone: [phone] Fax: [phone] PATIENT CARE TEAM: 
Patient Care Team: 
Graciela Oleary NP as PCP - General (Nurse Practitioner) Total visit time: 85 minutes (> 50% spent face-to-face counseling)

## 2020-10-02 NOTE — TELEPHONE ENCOUNTER
Per Loren Avendano - DAVID called patient to obtain information regarding [employer]. She states [employer] is the company she calls her test results to.  [phone].

## 2020-10-02 NOTE — PROGRESS NOTES
Tonnie Brunner is a 78 y.o. female with a history of NICM with Heartmate II implant date of 04/15/2017 [facility]). Patient was seen in clinic for a new patient visit at which time it was noted that her 11 volt Li-ion back up batteries in her  and back up  were . The 11 volt li-ion back up batteries were replaced to ensure safe operation of device per  guidelines. Drive line dressing change completed per policy. Patient stated she does not use the SorbaView dressing as she has skin irritation. Patient had a small amount of drainage and crusting at drive line exit site which patient stated was typical for her dressing changes. Patient provided a silicone foam dressing to use as a cover dressing and stat lock anchor. Wound vac removed from sternal wound. Wound measured 0.3x0. 4x0.5. Patient stated wound tunneled at 1:00, but was unable to assess tunnel without further opening wound. Reviewed wound with Pascual Noel NP (Cardiac Surgery) who agreed with decision to remove wound vac. Wound lightly packed with 1/4\" packing strip and covered with silicone foam dressing. Patient asked to have RN assess wound on buttocks. Wound appears to be a combination of pressure and moisture. Unable to fully assess. Called [employer], switched patient's account to [facility], and ordered 2 replacement back up borders. [address] and patient's remote INR services switched to [facility].

## 2020-10-02 NOTE — TELEPHONE ENCOUNTER
Lab work received notable for potassium of 5.4 and iron sat of 9%. Entresto decreased at 10/1/20 office visit. Reviewed with Dr. Mundo Gary who recommended patient be set up for Feraheme infusion 510mg X2 doses. Orders completed and signed by Dr. Mundo Gary. No further changes recommended. Garo Rebollar RN.

## 2020-10-05 NOTE — PROGRESS NOTES
Called and spoke with patient. Patient verbalized understanding of coumadin dosing and repeat inr date. Patient requested a copy of her labs and a Copay care for Vascepa. Will mail out to patient today.

## 2020-10-07 NOTE — TELEPHONE ENCOUNTER
Telephone Call RE:  Lab Reminder      Outcome:     [x] Patient verbalizes understanding    [] Unable to reach   [] Left message              []     Patient is reporting an INR today of 2.8    Will inform our nurse       Ubaldo Cassidy

## 2020-10-13 NOTE — TELEPHONE ENCOUNTER
Telephone Call RE:  Lab Reminder      Outcome:     [x] Patient verbalizes understanding    [] Unable to reach   [] Left message              []     Patient will check her INR in the AM    Monique Mcgraw

## 2020-10-20 NOTE — TELEPHONE ENCOUNTER
Telephone Call RE:  Lab Reminder      Outcome:     [x] Patient verbalizes understanding    [] Unable to reach   [] Left message              []     Patient will check her INR in the AM      [facility]

## 2020-10-21 NOTE — TELEPHONE ENCOUNTER
10/21/20 (2131) Patient states the Allopurinol that was started in 10/5/20 is causing her legs to swell and weep. She wants to know if there is an alternative. 10/22/20 (8215) Patient verbalized understanding that she may stop Allopurinol. Reminded patient of CT scan to be scheduled. Michelet Glaser asked that the CT be scheduled on the date of next appointment. Will attempt to schedule CT scan. [id number] - spoke with patient. Informed that there were no openings for CT on her appointment day. Phone number given for patient's daughter to call and schedule CT.

## 2020-10-22 NOTE — TELEPHONE ENCOUNTER
Telephone Call RE:  Lab Reminder      Outcome:     [x] Patient verbalizes understanding    [] Unable to reach   [] Left message              []     Patient will check her INR on Monday    Tish Byrne

## 2020-10-27 PROBLEM — I50.23 ACUTE ON CHRONIC SYSTOLIC CHF (CONGESTIVE HEART FAILURE) (HCC): Status: ACTIVE | Noted: 2020-01-01

## 2020-10-27 NOTE — Clinical Note
Right chest prepped with ChloraPrep and draped. Wet prep solution applied at: 1230. Wet prep solution dried at: 1234. Wet prep elapsed drying time: 4 mins.

## 2020-10-27 NOTE — PROGRESS NOTES
Outpatient Infusion Center Progress Note 1310 Pt admit to [facility] for Feraheme (1/2)  ambulatory in stable condition. Assessment completed. No new concerns voiced. PIV established to R hand; NS started at Jerardo Michelle. 1st dose education provided. Visit Vitals Pulse 69 Temp 97.3 °F (36.3 °C) Resp 18 SpO2 99% Breastfeeding No  
 
 
Medications: 
Medications Administered 0.9% sodium chloride infusion Admin Date 
10/27/2020 Action New Bag Dose 25 mL/hr Rate 25 mL/hr Route IntraVENous Administered By Barbara Cardenas, PADILLA  
  
  
 acetaminophen (TYLENOL) tablet 650 mg   
 Admin Date 
10/27/2020 Action Given Dose 650 mg Route Oral Administered By Barbara Cardenas, PADILLA  
  
  
 ferumoxytoL [facility]) 510 mg in 0.9% sodium chloride 100 mL, overfill volume 10 mL IVPB Admin Date 
10/27/2020 Action Given Dose 510 mg Rate 508 mL/hr Route IntraVENous Administered By Barbara Cardenas, PADILLA  
  
  
  
*Pt declined PO benadryl 1445 Pt tolerated treatment well. Pt unable to stay for post observation as she has a MD appt at the Heart Failure Clinic. D/c home ambulatory in no distress. Pt aware of next appointment scheduled for Future Appointments Date Time Provider Mello Pacheco 11/3/2020  2:00 PM [address]

## 2020-10-27 NOTE — PROGRESS NOTES
[address] [facility] [place] [address] [facility] [place] in [facility], [facility] Heart Failure Outpatient Clinic Note Patient name: Miguel Nguyen Patient : 1950 Patient MRN: [id number] Date of service: 10/27/20 Primary care physician: Beto Valenzuela NP 
Primary [facility] cardiologist: Steven Oquendo MD 
  
CHIEF COMPLAINT: 
Chronic systolic heart failure 
  PLAN: 
Admit patient to CVSU for acute RV failure, volume overload and RLE cellulitis · ID consult for RLE cellulitis and chronic sternal wound infection · Start antibiotics after blood cultures obtained · CT surgery consult to review chest CT for sternal wound infection · Start bumex 2mg IV twice daily; patient is not orthostatic in clinic today · Hydralazine 5mg IV q4h prn MAP >90 
· Labs: CBC, BMP, LFT, pro-NT-BNP, iron profile, thyroid profile, procalcitonin, sed rate, blood culture, check hemoccult · D/w Dr. Carla Liang, hospitalist service Continue current device speed 9400rpm; increase speed once MAPs under control Continue current medical therapy for heart failure Continue current dose of coreg 25mg twice daily Continue entresto to 49/51mg twice daily Continue norvasc 5mg daily No spironolactone due to hyperkalemia 5.4 Continue magnesium oxide 400mg daily, check Mg with labs Patient states she has reaction to allopurinol (leg edema), will hold and try colchicine or uloric Continue synthroid, TSH at goal 
Change vitamin D to high dose weekly x 12 weeks Chronic anticoagulation with coumadin, dosage per pharmacy (goal 2-2.5) Check iron level after second venofer infusion Continue baby ASA Patient not taking statin, retrieve medical records for reason ICD interrogation per routine Continue CPAP for JED Note: Needs multiple referrals prior to hospital discharge: nutritionist, GYN and urogynecologist, pulmonologist, EP cardiologist; also patient needs flu shot and review records if has pneumonia vaccine at [facility] 
  IMPRESSION: 
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 Ludwin Bedolla at [facility]) · C/b delayed extubation due to severe COPD 
· C/b critical illness polyneuropathy · C/b prolonged hospitalization post-LVAD, 55 days · C/b sternal wound infection s/p debridement (by Dr. Ludwin Bedolla) s/p wound vac · C/b sacral ulcer · Would culture positive for Staph aureus, not MRSA · C/b LVAD site drainage, improved S/p CRT-ICD · ICD fired due to electrolyte imbalance (2011) H/o breast cancer (1992) · s/p bilateral mastectomy/chemo and endometrial cancer s/p hysterectomy · Lymphedema of LLE due to cancer treatment Severe COPD with FEV1 50% Depression Atrial fibrillation H/o \"two mini strokes\" S/p fall with hip facture · Right hip hemmiarthroplasty (5/23/18) by Dr. Neena Colmenares) · S/p removed hip hardwarare due to pain (4/15/19) COPD severe CKD, stage 3 Hyperkalemia Pulmonary hypertension Cardiac risk factors: · Morbid obesity, BMI 45 
· DM2 insulin dependent · JED on CPAP 
· HL Urinary incontinence, severe · no procedures due to anticoagulation · conservative management with Detrol 4 pills bid Endometrial cancer (2002) HTN 
HL 
  
CARDIAC IMAGING: 
Echo (9/24/19) LVEF 20-25%, AV opens 1:1, no AR Echo (5/29/18) LVEF 10-%, ramps study done, report in epic Echo (1/9/18) ramp study done, LVEDD 7.1cm [facility] (11/9/18) 2 vessel disease with 90% OM, 80% PDA, DSA to PDA branch 
  
HEMODYNAMICS: 
RHC not done CPEST not done 6MW not done 
  
OTHER IMAGING: 
EGD/C-scope (4/17/19) no active bleeding and polyp removed. 
  
FAMILY HISTORY: 
Mother  76 years, diagnosed with DM, HTN, CAD Father  [de-identified]years old, HTN, CAD, MI 
Positive for DM and heart disease in the family, brother with valve replacement 
  
SOCIAL HISTORY: 
Never alcohol, never smoked, , lives alone, no illicit drug use, lives in house, ambulatory status indpendedn, children: daughter, occupation retired Lives alone. 
  
ALLERGIES: benzodiazepines and quetiapine fumerate (lethargy, resp failure to both) 
  
HISTORY OF PRESENT ILLNESS: 
I had the pleasure of seeing Minal Joaquin in [address] [facility] at [facility] in [address] [place] [address], Minal Joaquin is a 78 y.o. female with multiple comorbidities listed above presents to our clinic requesting transfer of care from [facility] LVAD program. 
  
INTERVAL HISTORY: 
Today, patient presents for second clinic visit accompanied by her daughter. 
  
Patient is doing fairly well. She is content with her quality of life despite many medical problems. She enjoys meeting friends and going out to mall. Patient reports \"reaction to allopurinol\". She noticed 5 days of RLE swelling and edema. She had no fever. 
  
Patient walked to our clinic with a walker for stability. Patient can walk short distance without dyspnea. Patient feels quite tired recently, which is new. Her MAP in clinic is in high 90s. Patient can perform home activities without problem and routinely participates in daily walking for more than 15 minutes. Patient would like to remain independent as long as she can; she lives alone. 
  
Patient states she also has more abdominal bloating. Patient's weight today is 289lbs, but she did not take her weights before.  
  
Patient denies orthopnea, PND or nocturia. Sleeps in recliner. 
  
Patient denies irregular heart rate or palpitations.  ICD has not fired this year. 
  
Patient denies other cardiac symptoms such as chest pain or leg pain with walking.  
  
Patient is compliant with fluid restriction and taking medications as prescribed. Patient manages his own medications. REVIEW OF SYSTEMS: 
General: Denies fever, night sweats. Ear, nose and throat: Denies difficulty hearing, sinus problems, runny nose, post-nasal drip, ringing in ears, mouth sores, loose teeth, ear pain, nosebleeds, sore throate, facial pain or numbess Cardiovascular: see above in the interval history Respiratory: Denies cough, wheezing, sputum production, hemoptysis. Gastrointestinal: Denies heartburn, constipation, intolerance to certain foods, diarrhea, abdominal pain, nausea, vomiting, difficulty swallowing, blood in stool Kidney and bladder: Denies painful urination, frequent urination, urgency, prostate problems and impotence Musculoskeletal: Denies joint pain, muscle weakness Skin and hair: Denies change in hair loss or increase, breast changes Redness and edema, skin breakdown RLE. PHYSICAL EXAM: 
Visit Vitals BP (!) 100/0 (BP 1 Location: Right arm, BP Patient Position: Sitting) Pulse 71 Temp 98.2 °F (36.8 °C) (Oral) Resp 24 Ht 5' 7\" (1.702 m) Wt 299 lb (135.6 kg) SpO2 97% BMI 46.83 kg/m² LVAD Pump Speed (RPM): 9400 Pump Flow (LPM): 5.1 PI (Pulsitility Index): 6.3 Power: 5.7 General: Patient is morbidly obese in no acute distress HEENT: Normocephalic and atraumatic. No scleral icterus. Pupils are equal, round and reactive to light and accomodation. No conjunctival injection. Oropharynx is clear. Neck: Supple. No evidence of thyroid enlargements or lymphadenopathy. JVD: Cannot be appreciated Lungs: Breath sounds are equal and clear bilaterally. No wheezes, rhonchi, or rales. Heart: VAD hum Abdomen: Soft, no mass or tenderness. No organomegaly or hernia. Bowel sounds present. Genitourinary and rectal: deferred Extremities: No cyanosis, clubbing, Redness and edema, skin breakdown RLE; BLE edema 3+, venous stasis Neurologic: No focal sensory or motor deficits are noted. Grossly intact. Psychiatric: Awake, alert an doriented x 3. Appropriate mood and affect. Skin: Warm, dry and well perfused. No lesions, nodules or rashes are noted. PAST MEDICAL HISTORY: 
Past Medical History:  
Diagnosis Date  Asthma  Cancer ([facility] breast  
 Cancer ([facility]   
 endometrial  
 Congestive heart failure, unspecified  CRI (chronic renal insufficiency)  Depression  Diabetes ([facility]  Hypertension PAST SURGICAL HISTORY: 
Past Surgical History:  
Procedure Laterality Date  HX HERNIA REPAIR    
 HX HYSTERECTOMY  HX MASTECTOMY FAMILY HISTORY: 
No family history on file. SOCIAL HISTORY: 
Social History Socioeconomic History  Marital status:  Spouse name: Not on file  Number of children: Not on file  Years of education: Not on file  Highest education level: Not on file Tobacco Use  Smoking status: Never Smoker  Smokeless tobacco: Never Used Substance and Sexual Activity  Alcohol use: Not Currently LABORATORY RESULTS: 
Labs Latest Ref Rng & Units 10/1/2020 WBC 3.4 - 10.8 x10E3/uL 7.2  
RBC 3.77 - 5.28 x10E6/uL 3.75(L) Hemoglobin 11.1 - 15.9 g/dL 9.3(L) Hematocrit 34.0 - 46.6 % 29. 7(L) MCV 79 - 97 fL 79 Platelets 012 - 111 O57C6/ Albumin 3.8 - 4.8 g/dL 3.8 Calcium 8.7 - 10.3 mg/dL 9.0 Glucose 65 - 99 mg/dL 182(H) BUN 8 - 27 mg/dL 27  
Creatinine 0.57 - 1.00 mg/dL 1.32(H) Sodium 134 - 144 mmol/L 140 Potassium 3.5 - 5.2 mmol/L 5.4(H) TSH 0.450 - 4.500 uIU/mL 2.980  - 226 IU/L 227(H) Some recent data might be hidden ALLERGY: 
No Known Allergies CURRENT MEDICATIONS: 
 
Current Outpatient Medications:  
  allopurinoL (ZYLOPRIM) 100 mg tablet, Take 1 Tab by mouth daily. , Disp: 90 Tab, Rfl: 1   ergocalciferol (ERGOCALCIFEROL) 1,250 mcg (50,000 unit) capsule, Take 1 Cap by mouth every seven (7) days. , Disp: 14 Cap, Rfl: 0 
  icosapent ethyL (VASCEPA) 1 gram capsule, Take 1 Cap by mouth two (2) times daily (with meals). , Disp: 60 Cap, Rfl: 1 
  amLODIPine (NORVASC) 5 mg tablet, Take 5 mg by mouth daily. , Disp: , Rfl:  
  ferrous sulfate (Iron) 325 mg (65 mg iron) tablet, Take  by mouth Daily (before breakfast). , Disp: , Rfl:  
  tolterodine (DETROL) 2 mg tablet, Take 8 mg by mouth two (2) times a day., Disp: , Rfl:  
  albuterol sulfate (PROVENTIL IN), Take 1 Puff by inhalation two (2) times daily as needed. , Disp: , Rfl:  
  budesonide-formoteroL (Symbicort) 160-4.5 mcg/actuation HFAA, Take 2 Puffs by inhalation ACB/HS. , Disp: , Rfl:  
  hydrOXYzine HCL (ATARAX) 10 mg tablet, Take  by mouth three (3) times daily as needed. , Disp: , Rfl:  
  warfarin (COUMADIN) 1 mg tablet, Take 5 mg by mouth daily. , Disp: , Rfl:  
  sacubitriL-valsartan (Entresto)  mg tablet, Take 1 Tab by mouth two (2) times a day., Disp: , Rfl:  
  PARoxetine (PAXIL) 30 mg tablet, Take 30 mg by mouth daily. , Disp: , Rfl:  
  gabapentin (NEURONTIN) 300 mg capsule, Take 1 Cap by mouth daily. Max Daily Amount: 300 mg., Disp: 30 Cap, Rfl: 2 
  amoxicillin-clavulanate (Augmentin) 875-125 mg per tablet, Take 1 Tab by mouth two (2) times a day., Disp: 60 Tab, Rfl: 2 
  insulin detemir U-100 (LEVEMIR FLEXTOUCH) 100 unit/mL (3 mL) inpn, 40 units at bedtime, Disp: 1 Adjustable Dose Pre-filled Pen Syringe, Rfl: 2   carvedilol (COREG) 12.5 mg tablet, Take 25 mg by mouth two (2) times daily (with meals). , Disp: , Rfl:  
  aspirin 81 mg tablet, Take  by mouth., Disp: , Rfl:  
No current facility-administered medications for this visit.   
 
Facility-Administered Medications Ordered in Other Visits:  
  [START ON 11/3/2020] ferumoxytoL (FERAHEME) 510 mg in 0.9% sodium chloride 100 mL, overfill volume 10 mL IVPB, 510 mg, IntraVENous, ONCE, MD Rosie Peace  [START ON 11/3/2020] cloNIDine HCL (CATAPRES) tablet 0.1 mg, 0.1 mg, Oral, Q4H PRN, Deanna Valdes MD 
[address] [place] [address]  [START ON 11/3/2020] diphenhydrAMINE (BENADRYL) capsule 25 mg, 25 mg, Oral, ONCE, Deanna Valdes MD 
  [START ON 11/3/2020] acetaminophen (TYLENOL) tablet 650 mg, 650 mg, Oral, ONCE, Deanna Valdes MD 
  0.9% sodium chloride infusion, 25 mL/hr, IntraVENous, CONTINUOUS, Deanna Valdes MD, Stopped at 10/27/20 1440   diphenhydrAMINE (BENADRYL) capsule 25 mg, 25 mg, Oral, ONCE, Deanna Valdes MD 
  cloNIDine HCL (CATAPRES) tablet 0.1 mg, 0.1 mg, Oral, Q4H PRN, Deanna Valdes MD 
 
Thank you for your referral and allowing me to participate in this patient's care. Rodney Hair MD PhD 
Beatriz Villasenor [address] [place] [address], [address] Phone: [phone] Fax: [phone] PATIENT CARE TEAM: 
Patient Care Team: 
Camila Sanchez NP as PCP - General (Nurse Practitioner) 40 min

## 2020-10-27 NOTE — ED TRIAGE NOTES
Triage Note: Patient is coming in with cellulitis to the right lower leg x 3 weeks. Patient was seen by Heart Failure and sent down to ED for further evaluation.

## 2020-10-27 NOTE — ED PROVIDER NOTES
Sandy Son is a 78 y.o. female with Hx of CHF (LVAD placement), breast/ endometrial CA, CRI, DMII, Depression, HTN, asthma who presents via WC to [facility] ED with cc of RLE cellulitis and decompensated HF admission. Pt was evaluated by HF clinic today and referred for direct admission (see Dr. Dnate Parmar note 10/27/20/ admission orders). Per Dr. Dante Parmar, she spoke w/ hospitalist team prior to patient arrival in ED. There were no beds inpatient, so the patient came to the ED. Progressively worsening RLE cellulitis w/ concern for evolving sternal wound infection based off CT Chest from today. Additionally, increased weight gain at home. Echo done today @ clinic. Patient has no acute concerns at this time. Has been self isolating since March, 2020. PCP: Suni Perkins NP There are no other complaints, changes or physical findings at this time. Past Medical History:  
Diagnosis Date  Asthma  Cancer ([facility] breast  
 Cancer ([facility]   
 endometrial  
 Congestive heart failure, unspecified  CRI (chronic renal insufficiency)  Depression  Diabetes ([facility]  Hypertension Past Surgical History:  
Procedure Laterality Date  HX HERNIA REPAIR    
 HX HYSTERECTOMY  HX MASTECTOMY History reviewed. No pertinent family history. Social History Socioeconomic History  Marital status:  Spouse name: Not on file  Number of children: Not on file  Years of education: Not on file  Highest education level: Not on file Occupational History  Not on file Social Needs  Financial resource strain: Not on file  Food insecurity Worry: Not on file Inability: Not on file  Transportation needs Medical: Not on file Non-medical: Not on file Tobacco Use  Smoking status: Never Smoker  Smokeless tobacco: Never Used Substance and Sexual Activity  Alcohol use: Not Currently  Drug use: Not on file  Sexual activity: Not on file Lifestyle  Physical activity Days per week: Not on file Minutes per session: Not on file  Stress: Not on file Relationships  Social connections Talks on phone: Not on file Gets together: Not on file Attends Yarsani service: Not on file Active member of club or organization: Not on file Attends meetings of clubs or organizations: Not on file Relationship status: Not on file  Intimate partner violence Fear of current or ex partner: Not on file Emotionally abused: Not on file Physically abused: Not on file Forced sexual activity: Not on file Other Topics Concern  Not on file Social History Narrative  Not on file ALLERGIES: Patient has no known allergies. Review of Systems Constitutional: Negative for activity change, appetite change, chills and fever. HENT: Negative for congestion, rhinorrhea, sinus pressure, sneezing and sore throat. Eyes: Negative for pain, discharge and visual disturbance. Respiratory: Positive for chest tightness and shortness of breath. Negative for cough. Cardiovascular: Positive for leg swelling. Negative for chest pain. Gastrointestinal: Negative for abdominal pain, diarrhea, nausea and vomiting. Genitourinary: Negative for dysuria, flank pain, frequency and urgency. Musculoskeletal: Positive for arthralgias and joint swelling. Negative for back pain, gait problem, myalgias and neck pain. Skin: Negative for color change and rash. Neurological: Negative for dizziness, speech difficulty, weakness, light-headedness, numbness and headaches. Psychiatric/Behavioral: Negative for agitation, behavioral problems and confusion. All other systems reviewed and are negative. Vitals:  
 10/27/20 1654 Pulse: 67 Resp: 22 Temp: 97.9 °F (36.6 °C) SpO2: 96% Weight: 131 kg (288 lb 12.8 oz) Height: 5' 7\" (1.702 m) Physical Exam 
Vitals signs and nursing note reviewed. Constitutional:   
   General: She is not in acute distress. Appearance: She is well-developed. HENT:  
   Head: Normocephalic and atraumatic. Right Ear: External ear normal.  
   Left Ear: External ear normal.  
   Nose: Nose normal.  
Eyes:  
   Conjunctiva/sclera: Conjunctivae normal.  
   Pupils: Pupils are equal, round, and reactive to light. Neck: Musculoskeletal: Normal range of motion and neck supple. Cardiovascular:  
   Comments: Mechanical hum Pulmonary:  
   Effort: Pulmonary effort is normal.  
Musculoskeletal: Normal range of motion. Skin: 
   General: Skin is warm and dry. Findings: Erythema (RLE erythema w/ swelling present; C/D/I bandage present) present. Neurological:  
   Mental Status: She is alert and oriented to person, place, and time. Psychiatric:     
   Behavior: Behavior normal.     
   Thought Content: Thought content normal.     
   Judgment: Judgment normal.  
 
  
 
MDM Number of Diagnoses or Management Options Cellulitis of right leg:  
Decompensated heart failure ([address]): LVAD (left ventricular assist device) present [facility]):  
Diagnosis management comments: DDx: cellulitis, sternal wound infection, decompensated HF Referred from cardiology for direct admission 2/2 HF exacerbation, RLE cellulitis, possible sternal wound infection. Consults/ orders for admission were placed prior to patient arrival by HF team. Hospitalist team was contacted once labs were reviewed and agreed to admission. Amount and/or Complexity of Data Reviewed Clinical lab tests: ordered and reviewed Review and summarize past medical records: yes Discuss the patient with other providers: yes (Hospitalist team ) Procedures 6:38 PM  
Lab orders placed per Dr. Jessica Valera admission note. Perfect serve note sent to hospitalist, making them aware patient in ED awaiting admission.  Placed in room 28, patient/ daughter were upset/ had concerns about ongoing time in the [address] [place] [address]. LABORATORY TESTS: 
Recent Results (from the past 12 hour(s)) CBC WITH AUTOMATED DIFF Collection Time: 10/27/20  6:31 PM  
Result Value Ref Range WBC 8.5 3.6 - 11.0 K/uL  
 RBC 3.81 3.80 - 5.20 M/uL HGB 9.5 (L) 11.5 - 16.0 g/dL HCT 32.5 (L) 35.0 - 47.0 % MCV 85.3 80.0 - 99.0 FL  
 MCH 24.9 (L) 26.0 - 34.0 PG  
 MCHC 29.2 (L) 30.0 - 36.5 g/dL  
 RDW 17.2 (H) 11.5 - 14.5 % PLATELET 471 782 - 697 K/uL MPV 10.4 8.9 - 12.9 FL  
 NRBC 0.0 0  WBC ABSOLUTE NRBC 0.00 0.00 - 0.01 K/uL NEUTROPHILS 73 32 - 75 % LYMPHOCYTES 17 12 - 49 % MONOCYTES 5 5 - 13 % EOSINOPHILS 3 0 - 7 % BASOPHILS 1 0 - 1 % IMMATURE GRANULOCYTES 1 (H) 0.0 - 0.5 % ABS. NEUTROPHILS 6.3 1.8 - 8.0 K/UL  
 ABS. LYMPHOCYTES 1.4 0.8 - 3.5 K/UL  
 ABS. MONOCYTES 0.4 0.0 - 1.0 K/UL  
 ABS. EOSINOPHILS 0.3 0.0 - 0.4 K/UL  
 ABS. BASOPHILS 0.0 0.0 - 0.1 K/UL  
 ABS. IMM. GRANS. 0.0 0.00 - 0.04 K/UL  
 DF AUTOMATED METABOLIC PANEL, COMPREHENSIVE Collection Time: 10/27/20  6:31 PM  
Result Value Ref Range Sodium 140 136 - 145 mmol/L Potassium 4.2 3.5 - 5.1 mmol/L Chloride 108 97 - 108 mmol/L  
 CO2 28 21 - 32 mmol/L Anion gap 4 (L) 5 - 15 mmol/L Glucose 192 (H) 65 - 100 mg/dL BUN 28 (H) 6 - 20 MG/DL Creatinine 1.60 (H) 0.55 - 1.02 MG/DL  
 BUN/Creatinine ratio 18 12 - 20 GFR est AA 39 (L) >60 ml/min/1.73m2 GFR est non-AA 32 (L) >60 ml/min/1.73m2 Calcium 8.7 8.5 - 10.1 MG/DL Bilirubin, total 0.2 0.2 - 1.0 MG/DL  
 ALT (SGPT) 7 (L) 12 - 78 U/L  
 AST (SGOT) 15 15 - 37 U/L Alk. phosphatase 93 45 - 117 U/L Protein, total 7.6 6.4 - 8.2 g/dL Albumin 3.3 (L) 3.5 - 5.0 g/dL Globulin 4.3 (H) 2.0 - 4.0 g/dL A-G Ratio 0.8 (L) 1.1 - 2.2 SAMPLES BEING HELD Collection Time: 10/27/20  6:31 PM  
Result Value Ref Range SAMPLES BEING HELD 1BLU   
 COMMENT   Add-on orders for these samples will be processed based on acceptable specimen integrity and analyte stability, which may vary by analyte. T3, FREE Collection Time: 10/27/20  6:31 PM  
Result Value Ref Range Free Triiodothyronine (T3) 2.0 (L) 2.2 - 4.0 pg/mL LD Collection Time: 10/27/20  6:31 PM  
Result Value Ref Range  (H) 81 - 246 U/L  
NT-PRO BNP Collection Time: 10/27/20  6:31 PM  
Result Value Ref Range NT pro-BNP 1,604 (H) <125 PG/ML  
IRON PROFILE Collection Time: 10/27/20  6:31 PM  
Result Value Ref Range Iron 353 (H) 35 - 150 ug/dL TIBC 560 (H) 250 - 450 ug/dL Iron % saturation 63 (H) 20 - 50 % PROCALCITONIN Collection Time: 10/27/20  6:31 PM  
Result Value Ref Range Procalcitonin 0.05 ng/mL SED RATE (ESR) Collection Time: 10/27/20  6:31 PM  
Result Value Ref Range Sed rate, automated 59 (H) 0 - 30 mm/hr LACTIC ACID Collection Time: 10/27/20  6:45 PM  
Result Value Ref Range Lactic acid 1.0 0.4 - 2.0 MMOL/L IMAGING RESULTS: 
No orders to display MEDICATIONS GIVEN: 
Medications  
albuterol (PROVENTIL HFA, VENTOLIN HFA, PROAIR HFA) inhaler 1 Puff (has no administration in time range) amLODIPine (NORVASC) tablet 5 mg (has no administration in time range)  
amoxicillin-clavulanate (AUGMENTIN) 875-125 mg per tablet 1 Tab (has no administration in time range) aspirin delayed-release tablet 81 mg (has no administration in time range)  
arformoterol 15 mcg/budesonide 0.5 mg neb solution (has no administration in time range)  
carvediloL (COREG) tablet 25 mg (has no administration in time range) ergocalciferol capsule 50,000 Units (has no administration in time range)  
ferrous sulfate tablet 325 mg (has no administration in time range)  
gabapentin (NEURONTIN) capsule 300 mg (has no administration in time range)  
hydrOXYzine HCL (ATARAX) tablet 10 mg (has no administration in time range)  
icosapent ethyL (VASCEPA) capsule 1 Cap (has no administration in time range)  
insulin glargine (LANTUS) injection 40 Units (has no administration in time range) PARoxetine (PAXIL) tablet 30 mg (has no administration in time range)  
sacubitriL-valsartan (ENTRESTO)  mg tablet 1 Tab (has no administration in time range)  
tolterodine (DETROL) tablet 8 mg (has no administration in time range)  
warfarin (COUMADIN) tablet 5 mg (has no administration in time range)  
hydrALAZINE (APRESOLINE) 20 mg/mL injection 5 mg (has no administration in time range) bumetanide (BUMEX) injection 2 mg (has no administration in time range)  
acetaminophen (TYLENOL) tablet 650 mg (has no administration in time range) IMPRESSION: 
1. Cellulitis of right leg 2. Decompensated heart failure ([facility] 3. [facility] (left ventricular [facility] device[facility] PLAN FOR ADMISSION: 
 
The patient is being admitted to the hospital. The results of their tests and reasons for their admission have been discussed with the patient and/or available family. The patient/family has conveyed agreement and understanding for the need to be admitted and for their admission diagnosis. Consultation has been made with the inpatient physician specialist for hospitalization.   
Jamar Hendrickson NP

## 2020-10-28 NOTE — PROGRESS NOTES
Pharmacist Note  Warfarin Dosing Consult provided for this 78 y. o.female to manage warfarin for LVAD, AFib INR Goal: 2 - 2.5 INR Trend (1 Year) Recent Labs 10/28/20 
0400 10/26/20 10/21/20 10/14/20 10/07/20 10/01/20 
0000 INR 2.7* 2.9 4.2 3.7 2.8 2.1* Home regimen/ tablet size: 1 mg tabs (patient alternates 4 mg qPM and 5 mg qPM) -- 10/21: Patient instructed to hold dose on 10/21 and then start \"to 4 mg all nights and repeat on Monday [10/26]. \" 
 -- 10/26: \"No change. Check in one week. \" (4 mg qPM) Drugs that may increase INR: None Drugs that may decrease INR: None Other current anticoagulants/ drugs that may increase bleeding risk: None Risk factors: Age > 72, Decompensated Heart Failure, LVAD Daily INR ordered: YES Recent Labs 10/28/20 
0400 10/28/20 
0330 10/27/20 
1831 10/26/20 HGB  --  8.8* 9.5*  --   
INR 2.7*  --   --  2.9 Date               INR                  Dose 
10/26  2.9  4 mg 
10/27  --  -- 
10/28  2.7  4 mg Assessment/ Plan: Will order warfarin 4 mg PO x 1 dose. WIll give dose now vs. 1700 to hopefully attenuate INR decreasing slope from held dose yesterday Pharmacy will continue to monitor daily and adjust therapy as indicated. Please contact the pharmacist at x [phone] or  for outpatient recommendations if needed. Karen Acevedo PharmD Clinical Staff Pharmacist 
[facility] ()

## 2020-10-28 NOTE — PROGRESS NOTES
Pharmacist Note - Vancomycin Dosing Consult provided for this 78 y.o. female for indication of cellulitis of the right lower extremity. Antibiotic regimen(s): Zosyn and Vancomycin Patient on vancomycin PTA? NO Recent Labs 10/28/20 
0330 10/27/20 
1831 WBC 7.0 8.5 CREA 1.38* 1.60* BUN 24* 28* Frequency of BMP: daily through 10/31/2020 Height: 170.2 cm Weight: 131 kg Est CrCl: ~55 ml/min Temp (24hrs), Av.8 °F (36.6 °C), Min:97.3 °F (36.3 °C), Max:98.2 °F (36.8 °C) Cultures: 
10/27 Blood pending Goal trough = 10 - 15 mcg/mL Therapy will be initiated with a loading dose of 2000 mg IV x 1 to be followed by a maintenance dose of 2000 mg IV every 24 hours. Pharmacy to follow patient daily and order levels / make dose adjustments as appropriate.

## 2020-10-28 NOTE — PROGRESS NOTES
[id number] [facility] Adult  Hospitalist Group Hospitalist Progress Note Aly Gaines MD 
Answering service: [phone] OR [phone] from in house phone Date of Service:  10/28/2020 NAME:  Carly Herndon :  1950 MRN:  [id number] This documentation was facilitated by a Voice Recognition software and may contain inadvertent typographical errors. Admission Summary: This is a 78year old female with a significant past medical history of chronic systolic heart failure due to ischemic cardiomyopathy with implanted left ventricular assist device. Other significant PMH:  type 2 diabetes, hypertension, obesity, COPD, depression invasion Interval history / Subjective:  
    
Patient is seen in CVICU, lying in bed comfortably without any distress. She admits some pain to the  light leg wound. She denies shortness of breath or chest pain presently. She denies fever or chills as. Assessment & Plan:  
Acute-on-chronic systolic congestive heart failure,, NYHA class IV. Ischemic cardiomyopathy supported by left ventricular assist device Hypertension 
-Being diuresed with IV Bumex. Continue other cardiac regimen: Carvedilol, Entresto. 
-Daily weight. Strict input and output. 
-Further management by advanced heart failure team. 
 
 
Cellulitis of the right lower extremity in the background of venous stasis and small wound on the right lower leg most probable portal of entry of infection 
-Continue IV Zosyn and vancomycin. -ID consulted. Suspect sternal wound infection: Antibiotics as above. Cardiac surgery consulted Chronic normocytic anemia. Hemoglobin stable between 89 Type 2 diabetes with hyperglycemia 
-Monitor blood sugar: Before meals and at bedtime 
-Sliding scale hemoglobin: AC&HS 
-Continue NPH 20 units twice daily COPD without bronchospasm 
-Continue maintenance inhaled steroids. Bronchodilators as needed. CKD 3, appears baseline. Depression, stable. Continue  Paxil Morbid obesity Body mass index is 47.68 kg/m². -Discussed healthy lifestyle healthy eating and weight loss, encouraged her to increase physical activity within her limits. Code status: Full code DVT prophylaxis: On warfarin Care Plan discussed with: Patient/Family and Nurse Anticipated Disposition: Home w/Family Anticipated Discharge: Greater than 48 hours Hospital Problems  Date Reviewed: 10/28/2020 Codes Class Noted POA * (Principal) Acute on chronic systolic CHF (congestive heart failure) (HCC) ICD-10-CM: E47.16 ICD-9-CM: 428.23, 428.0  10/27/2020 Yes Review of Systems: A comprehensive review of systems was negative except for that written in the HPI. Vital Signs:  
 Last 24hrs VS reviewed since prior progress note. Most recent are: 
Visit Vitals BP (!) 90/58 (BP 1 Location: Right arm, BP Patient Position: At rest) Pulse 63 Temp 98.1 °F (36.7 °C) Resp 22 Ht 5' 7\" (1.702 m) Wt 138.1 kg (304 lb 7.3 oz) SpO2 95% BMI 47.68 kg/m² Intake/Output Summary (Last 24 hours) at 10/28/2020 1753 Last data filed at 10/28/2020 1333 Gross per 24 hour Intake 200 ml Output  Net 200 ml Physical Examination:  
 
 
     
Constitutional:  Patient's lying flat in bed without any distress, on room air. HEENT:  Atraumatic. Oral mucosa moist,. Non icteric sclera. No pallor. Resp:  On room air. CTA bilaterally. No wheezing/rhonchi/rales. No accessory muscle use Chest Wall: No deformity CV:  Regular rhythm, normal rate, no murmurs, gallops, rubs GI:  Obese. LVAD exit to the epigastric area. Soft, non distended, non tender. normoactive bowel sounds, no hepatosplenomegaly :  No CVA or suprapubic tenderness Musculoskeletal:  Purplish erythema on the right lower extremity. Small superficial wound without discharge.   
 Neurologic:  Mental status:AAOx3,  
Cranial nerves II-XII : WNL Motor exam:Moves all extremities symmetrically Data Review:  
 Review and/or order of clinical lab test 
Review and/or order of tests in the radiology section of CPT Review and/or order of tests in the medicine section of CPT Labs:  
 
Recent Labs 10/28/20 
0330 10/27/20 
1831 WBC 7.0 8.5 HGB 8.8* 9.5* HCT 30.2* 32.5*  
 218 Recent Labs 10/28/20 
0330 10/27/20 
1831  140  
K 4.1 4.2 * 108 CO2 26 28 BUN 24* 28* CREA 1.38* 1.60* * 192* CA 8.5 8.7 MG 2.0  --   
PHOS 3.4  --   
 
Recent Labs 10/28/20 
0330 10/27/20 
1831 ALT 7* 7* AP 93 93 TBILI 0.3 0.2 TP 6.9 7.6 ALB 2.9* 3.3*  
GLOB 4.0 4.3* Recent Labs 10/28/20 
1119 10/28/20 
0400 10/26/20 INR 2.7* 2.7* 2.9 PTP 27.0* 26.7*  --   
APTT 52.1*  --   --   
  
Recent Labs 10/28/20 
0356 10/28/20 
0330 10/27/20 
1831 TIBC 348  --  560* PSAT 98*  --  63* FERR 72 PLEASE DISREGARD RESULTS  --   
  
Lab Results Component Value Date/Time Folate 10.0 10/28/2020 03:56 AM  
  
No results for input(s): PH, PCO2, PO2 in the last 72 hours. No results for input(s): CPK, CKNDX, TROIQ in the last 72 hours. No lab exists for component: CPKMB Lab Results Component Value Date/Time Cholesterol, total 192 10/01/2020 12:00 AM  
 HDL Cholesterol 29 (L) 10/01/2020 12:00 AM  
 LDL Chol Calc (NIH) 123 (H) 10/01/2020 12:00 AM  
 Triglyceride 222 (H) 10/01/2020 12:00 AM  
 
Lab Results Component Value Date/Time Glucose (POC) 154 (H) 10/28/2020 04:25 PM  
 Glucose (POC) 164 (H) 10/28/2020 11:45 AM  
 Glucose (POC) 226 (H) 10/28/2020 08:15 AM  
 
No results found for: COLOR, APPRN, SPGRU, REFSG, GENOVEVA, PROTU, GLUCU, KETU, BILU, UROU, DAVIE, LEUKU, GLUKE, EPSU, BACTU, WBCU, RBCU, CASTS, UCRY Medications Reviewed:  
 
Current Facility-Administered Medications Medication Dose Route Frequency  albuterol (PROVENTIL VENTOLIN) nebulizer solution 2.5 mg  2.5 mg Nebulization BID PRN  
 sodium chloride (NS) flush 5-40 mL  5-40 mL IntraVENous Q8H  
 sodium chloride (NS) flush 5-40 mL  5-40 mL IntraVENous PRN  polyethylene glycol (MIRALAX) packet 17 g  17 g Oral DAILY PRN  
 insulin lispro (HUMALOG) injection   SubCUTAneous AC&HS  
 glucose chewable tablet 16 g  4 Tab Oral PRN  
 glucagon (GLUCAGEN) injection 1 mg  1 mg IntraMUSCular PRN  Vancomycin dosing per pharmacy   Other Rx Dosing/Monitoring  [START ON 10/29/2020] vancomycin (VANCOCIN) 2000 mg in  ml infusion  2,000 mg IntraVENous Q24H  
 amLODIPine (NORVASC) tablet 2.5 mg  2.5 mg Oral DAILY  sodium chloride (NS) flush 5-40 mL  5-40 mL IntraVENous Q8H  
 sodium chloride (NS) flush 5-40 mL  5-40 mL IntraVENous PRN  
 Warfarin - Pharmacy to Dose   Other Rx Dosing/Monitoring  [START ON 10/29/2020] magnesium oxide (MAG-OX) tablet 400 mg  400 mg Oral DAILY  dextrose 10% infusion 0-250 mL  0-250 mL IntraVENous PRN  
 insulin NPH (NOVOLIN N, HUMULIN N) injection 20 Units  0.15 Units/kg SubCUTAneous BID  insulin lispro (HUMALOG) injection   SubCUTAneous AC&HS  piperacillin-tazobactam (ZOSYN) 3.375 g in 0.9% sodium chloride (MBP/ADV) 100 mL  3.375 g IntraVENous Q8H  
 aspirin delayed-release tablet 81 mg  81 mg Oral DAILY  arformoterol 15 mcg/budesonide 0.5 mg neb solution   Nebulization BID RT  
 carvediloL (COREG) tablet 25 mg  25 mg Oral BID WITH MEALS  
 [START ON 10/29/2020] ergocalciferol capsule 50,000 Units  50,000 Units Oral Q7D  
 ferrous sulfate tablet 325 mg  1 Tab Oral DAILY WITH BREAKFAST  gabapentin (NEURONTIN) capsule 300 mg  300 mg Oral DAILY  hydrOXYzine HCL (ATARAX) tablet 10 mg  10 mg Oral TID PRN  
 icosapent ethyL (VASCEPA) capsule 1 Cap  1 Cap Oral BID WITH MEALS  
 PARoxetine (PAXIL) tablet 30 mg  30 mg Oral DAILY  sacubitriL-valsartan (ENTRESTO)  mg tablet 1 Tab  1 Tab Oral BID  tolterodine (DETROL) tablet 8 mg  8 mg Oral BID  hydrALAZINE (APRESOLINE) 20 mg/mL injection 5 mg  5 mg IntraVENous Q4H PRN  
 bumetanide (BUMEX) injection 2 mg  2 mg IntraVENous BID  acetaminophen (TYLENOL) tablet 650 mg  650 mg Oral Q4H PRN Facility-Administered Medications Ordered in Other Encounters Medication Dose Route Frequency  [START ON 11/3/2020] ferumoxytoL (FERAHEME) 510 mg in 0.9% sodium chloride 100 mL, overfill volume 10 mL IVPB  510 mg IntraVENous ONCE  
 [START ON 11/3/2020] cloNIDine HCL (CATAPRES) tablet 0.1 mg  0.1 mg Oral Q4H PRN  
 [START ON 11/3/2020] diphenhydrAMINE (BENADRYL) capsule 25 mg  25 mg Oral ONCE  
 [START ON 11/3/2020] acetaminophen (TYLENOL) tablet 650 mg  650 mg Oral ONCE  
 
______________________________________________________________________ EXPECTED LENGTH OF STAY: - - - 
ACTUAL LENGTH OF STAY:          1 Osmin Arrington MD

## 2020-10-28 NOTE — PROGRESS NOTES
[address] [facility] [address] [facility] in [address], [place] Heart Failure Inpatient Clinic Note Patient name: Gena King Patient : 1950 Patient MRN: [id number] Date of service: 10/28/20 Primary care physician: Cade Hernandes NP 
Primary F cardiologist: Sixto Ponce MD 
  
CHIEF COMPLAINT: 
Chief Complaint Patient presents with  
 Skin Problem Chronic systolic heart failure 
  PLAN: 
Admit patient to CVSU for acute RV failure, volume overload and RLE cellulitis · ID consult for RLE cellulitis and chronic sternal wound infection · Start antibiotics after blood cultures obtained · CT surgery consult to review chest CT for sternal wound infection · Bumex 2mg IV twice daily · Hydralazine 5mg IV q4h prn MAP >90 
· Trend daily: CBC, CMP, pro-NT-BNP, mag, LD, INR · Check iron profile, ferritin, procalcitonin, sed rate, blood culture, hemoccult, U/A · Blood cultures (10/27/20) NGTD · TTE (10/27/20) LVEF 15-20% LVIDd 7.26cm RVIDd 2.82cm Tapse 1.14cm · D/w Dr. Prema Williamson, hospitalist service Continue current device speed 9400rpm; increase speed once MAPs under control Continue current medical therapy for heart failure Continue current dose of coreg 25mg twice daily Continue entresto to 49/51mg twice daily Decrease Norvasc to 2.5mg daily No spironolactone due to hyperkalemia Magnesium oxide 400mg daily, check Mg with labs Patient states she has reaction to allopurinol (leg edema), will hold and try colchicine or uloric Continue synthroid, TSH & Free T4 at goal 
Change vitamin D to high dose weekly x 12 weeks Chronic anticoagulation with coumadin, dosage per pharmacy (goal 2-2.5) Check iron level after second venofer infusion Continue baby ASA Patient not taking statin, retrieve medical records for reason ICD interrogation per routine Continue CPAP for JED   
DTC consult recs appreciated, HgbA1C -8.4% D/C lantus, started NPH and SSI per DTC recs Note: Needs multiple referrals prior to hospital discharge: nutritionist, GYN and urogynecologist, pulmonologist, EP cardiologist; also patient needs flu shot and review records if has pneumonia vaccine at [facility]: 
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 Eddie Dumont at [facility]) · C/b delayed extubation due to severe COPD 
· C/b critical illness polyneuropathy · C/b prolonged hospitalization post-LVAD, 55 days · C/b sternal wound infection s/p debridement (by Dr. Eddie Dumont) s/p wound vac · C/b sacral ulcer · Would culture positive for Staph aureus, not MRSA · C/b LVAD site drainage, improved S/p CRT-ICD · ICD fired due to electrolyte imbalance (2011) H/o breast cancer (1992) · s/p bilateral mastectomy/chemo and endometrial cancer s/p hysterectomy · Lymphedema of LLE due to cancer treatment Severe COPD with FEV1 50% Depression Atrial fibrillation H/o \"two mini strokes\" S/p fall with hip facture · Right hip hemmiarthroplasty (5/23/18) by Dr. Thomas Ortega) · S/p removed hip hardwarare due to pain (4/15/19) COPD severe CKD, stage 3 Hyperkalemia Pulmonary hypertension Cardiac risk factors: · Morbid obesity, Body mass index is 45.23 kg/m². · DM2 insulin dependent · JED on CPAP 
· HL Urinary incontinence, severe · no procedures due to anticoagulation · conservative management with Detrol 4 pills bid Endometrial cancer (2002) HTN 
HL 
  
CARDIAC IMAGING: 
Echo (9/24/19) LVEF 20-25%, AV opens 1:1, no AR Echo (5/29/18) LVEF 10-%, ramps study done, report in [facility] Echo (18) ramp study done, LVEDD 7.1cm [facility] (18) 2 vessel disease with 90% OM, 80% PDA, DSA to PDA branch 
  
HEMODYNAMICS: 
RHC not done CPEST not done 6MW not done 
  
OTHER IMAGING: 
EGD/C-scope (19) no active bleeding and polyp removed. 
  
FAMILY HISTORY: 
Mother  76 years, diagnosed with DM, HTN, CAD Father  [de-identified]years old, HTN, CAD, MI 
Positive for DM and heart disease in the family, brother with valve replacement 
  
SOCIAL HISTORY: 
Never alcohol, never smoked, , lives alone, no illicit drug use, lives in house, ambulatory status indpendedn, children: daughter, occupation retired Lives alone. 
  
ALLERGIES: benzodiazepines and quetiapine fumerate (lethargy, resp failure to both) 
  
HISTORY OF PRESENT ILLNESS: 
I had the pleasure of seeing Brisa Vasquez in [address] [place] [address] at [address] [place] [address] in [address] [place] [address], Brisa Vasquez is a 78 y.o. female with multiple comorbidities listed above presents to our clinic requesting transfer of care from [facility] LVAD program. 
  
INTERVAL HISTORY: 
Today, patient presents for second clinic visit accompanied by her daughter. 
  
Patient is doing fairly well. She is content with her quality of life despite many medical problems. She enjoys meeting friends and going out to mall. Patient reports \"reaction to allopurinol\". She noticed 5 days of RLE swelling and edema. She had no fever. 
  
Patient walked to our clinic with a walker for stability. Patient can walk short distance without dyspnea. Patient feels quite tired recently, which is new. Her MAP in clinic is in high 90s. Patient can perform home activities without problem and routinely participates in daily walking for more than 15 minutes. Patient would like to remain independent as long as she can; she lives alone. 
  
Patient states she also has more abdominal bloating.  Patient's weight today is 289lbs, but she did not take her weights before.  
  
Patient denies orthopnea, PND or nocturia. Sleeps in recliner. 
  
Patient denies irregular heart rate or palpitations. ICD has not fired this year. 
  
Patient denies other cardiac symptoms such as chest pain or leg pain with walking.  
  
Patient is compliant with fluid restriction and taking medications as prescribed. Patient manages his own medications. REVIEW OF SYSTEMS: 
General: Denies fever, night sweats, unhappy about being in ED overnight Ear, nose and throat: Denies difficulty hearing, sinus problems, runny nose, post-nasal drip, ringing in ears, mouth sores, loose teeth, ear pain, nosebleeds, sore throate, facial pain or numbess Cardiovascular: see above in the interval history Respiratory: + congested cough, denies wheezing, sputum production, or hemoptysis. Gastrointestinal: Denies heartburn, constipation, intolerance to certain foods, diarrhea, abdominal pain, nausea, vomiting, difficulty swallowing, blood in stool Kidney and bladder: Denies painful urination, frequent urination, or urgency Musculoskeletal: Denies joint pain, muscle weakness Skin and hair: Denies change in hair loss or increase, breast changes Redness and edema, skin breakdown RLE. PHYSICAL EXAM: 
Visit Vitals BP (!) 90/58 (BP 1 Location: Right arm, BP Patient Position: At rest) Pulse 71 Temp 98.1 °F (36.7 °C) Resp 19 Ht 5' 7\" (1.702 m) Wt 288 lb 12.8 oz (131 kg) SpO2 100% BMI 45.23 kg/m² General: Patient is morbidly obese in no acute distress HEENT: Normocephalic and atraumatic. No scleral icterus. Pupils are equal, round and reactive to light and accomodation. No conjunctival injection. Oropharynx is clear. Neck: Supple. No evidence of thyroid enlargements or lymphadenopathy. JVD: Cannot be appreciated Lungs: Breath sounds are equal and clear bilaterally. No wheezes, rhonchi, or rales. Heart: VAD hum Abdomen: Soft, obese, no mass or tenderness.  No organomegaly or hernia. Bowel sounds present. Genitourinary and rectal: deferred Extremities: No cyanosis, clubbing, Redness and edema, skin breakdown RLE; BLE edema 3+, venous stasis Neurologic: No focal sensory or motor deficits are noted. Grossly intact. Psychiatric: Awake, alert and oriented x 3. Appropriate mood and affect. Skin: Warm, dry, no nodules or rashes are noted. PAST MEDICAL HISTORY: 
Past Medical History:  
Diagnosis Date  Asthma  [facility]  
 [facility] ([facility]   
 endometrial  
 Congestive heart failure, unspecified  CRI (chronic renal insufficiency)  Depression  Diabetes ([facility]  Hypertension PAST SURGICAL HISTORY: 
Past Surgical History:  
Procedure Laterality Date  HX HERNIA REPAIR    
 HX HYSTERECTOMY  HX MASTECTOMY FAMILY HISTORY: 
History reviewed. No pertinent family history. SOCIAL HISTORY: 
Social History Socioeconomic History  Marital status:  Spouse name: Not on file  Number of children: Not on file  Years of education: Not on file  Highest education level: Not on file Tobacco Use  Smoking status: Never Smoker  Smokeless tobacco: Never Used Substance and Sexual Activity  Alcohol use: Not Currently LABORATORY RESULTS: 
Labs Latest Ref Rng & Units 10/28/2020 10/27/2020 10/1/2020 WBC 3.6 - 11.0 K/uL 7.0 8.5 7.2 RBC 3.80 - 5.20 M/uL 3.57(L) 3.81 3.75(L) Hemoglobin 11.5 - 16.0 g/dL 8.8(L) 9.5(L) 9.3(L) Hematocrit 35.0 - 47.0 % 30. 2(L) 32. 5(L) 29. 7(L) MCV 80.0 - 99.0 FL 84.6 85.3 79 Platelets 291 - 073 K/uL 189 218 225 Lymphocytes 12 - 49 % 15 17 - Monocytes 5 - 13 % 5 5 - Eosinophils 0 - 7 % 3 3 - Basophils 0 - 1 % 0 1 - Albumin 3.5 - 5.0 g/dL 2. 9(L) 3. 3(L) 3.8 Calcium 8.5 - 10.1 MG/DL 8.5 8.7 9.0 Glucose 65 - 100 mg/dL 245(H) 192(H) 182(H) BUN 6 - 20 MG/DL 24(H) 28(H) 27 Creatinine 0.55 - 1.02 MG/DL 1.38(H) 1.60(H) 1.32(H) Sodium 136 - 145 mmol/L 142 140 140 Potassium 3.5 - 5.1 mmol/L 4.1 4.2 5.4(H) TSH 0.450 - 4.500 uIU/mL - - 2.980 LDH 81 - 246 U/L - 324(H) 227(H) Some recent data might be hidden ALLERGY: 
No Known Allergies CURRENT MEDICATIONS: 
 
Current Facility-Administered Medications:  
  albuterol (PROVENTIL VENTOLIN) nebulizer solution 2.5 mg, 2.5 mg, Nebulization, BID PRN, Disha Hawley MD 
  sodium chloride (NS) flush 5-40 mL, 5-40 mL, IntraVENous, Q8H, Disha Hawley MD, 10 mL at 10/28/20 9376   sodium chloride (NS) flush 5-40 mL, 5-40 mL, IntraVENous, PRN, Disha Hawley MD 
  polyethylene glycol (MIRALAX) packet 17 g, 17 g, Oral, DAILY PRN, Disha Hawley MD 
  piperacillin-tazobactam (ZOSYN) 3.375 g in 0.9% sodium chloride (MBP/ADV) 100 mL, 3.375 g, IntraVENous, Q6H, Disha Hawley MD, Last Rate: 200 mL/hr at 10/28/20 0405, 3.375 g at 10/28/20 0405   insulin lispro (HUMALOG) injection, , SubCUTAneous, AC&HS, Dsiha Hawley MD, 2 Units at 10/28/20 1241 
  glucose chewable tablet 16 g, 4 Tab, Oral, PRN, Disha Hawley MD 
  glucagon (GLUCAGEN) injection 1 mg, 1 mg, IntraMUSCular, PRN, Disha Hawley MD 
  dextrose 10 % infusion 125-250 mL, 125-250 mL, IntraVENous, PRN, Disha Hawley MD 
  Vancomycin dosing per pharmacy, , Other, Rx Dosing/Monitoring, Disha Hawley MD 
  piperacillin-tazobactam (ZOSYN) 3.375 g in 0.9% sodium chloride (MBP/ADV) 100 mL, 3.375 g, IntraVENous, ONCE, Disha Hawley MD, Stopped at 10/28/20 0339 
  [START ON 10/29/2020] vancomycin (VANCOCIN) 2000 mg in  ml infusion, 2,000 mg, IntraVENous, Q24H, Disha Hawley MD 
  amLODIPine (NORVASC) tablet 2.5 mg, 2.5 mg, Oral, DAILY, Mar Flores NP 
  sodium chloride (NS) flush 5-40 mL, 5-40 mL, IntraVENous, Q8H, Mar Flores E, NP, 10 mL at 10/28/20 2875   sodium chloride (NS) flush 5-40 mL, 5-40 mL, IntraVENous, PRN, aMr Flores NP 
  Warfarin - Pharmacy to Dose, , Other, Rx Dosing/Monitoring, Naila Levy MD 
  warfarin (COUMADIN) tablet 4 mg, 4 mg, Oral, NOW, Rosemarie Ward MD 
  aspirin delayed-release tablet 81 mg, 81 mg, Oral, DAILY, Disha Hawley MD, 81 mg at 10/28/20 0921 
  arformoterol 15 mcg/budesonide 0.5 mg neb solution, , Nebulization, BID RT, Disha Hawley MD, Stopped at 10/28/20 0800   carvediloL (COREG) tablet 25 mg, 25 mg, Oral, BID WITH MEALS, Disha Hawley MD, 25 mg at 10/28/20 9779   [START ON 10/29/2020] ergocalciferol capsule 50,000 Units, 50,000 Units, Oral, Q7D, Disha Hawley MD 
  ferrous sulfate tablet 325 mg, 1 Tab, Oral, DAILY WITH BREAKFAST, Disha Hawley MD, 325 mg at 10/28/20 0920 
  gabapentin (NEURONTIN) capsule 300 mg, 300 mg, Oral, DAILY, Disha Hawley MD, 300 mg at 10/28/20 6216   hydrOXYzine HCL (ATARAX) tablet 10 mg, 10 mg, Oral, TID PRN, Disha Hawley MD 
  icosapent ethyL (VASCEPA) capsule 1 Cap, 1 Cap, Oral, BID WITH MEALS, Disha Hawley MD 
  insulin glargine (LANTUS) injection 40 Units, 40 Units, SubCUTAneous, QHS, Disha Hawley MD 
  PARoxetine (PAXIL) tablet 30 mg, 30 mg, Oral, DAILY, Disha Hawley MD, 30 mg at 10/28/20 0921 
  sacubitriL-valsartan (ENTRESTO)  mg tablet 1 Tab, 1 Tab, Oral, BID, Disha Hawley MD, 1 Tab at 10/28/20 9212   tolterodine (DETROL) tablet 8 mg, 8 mg, Oral, BID, Disha Hawley MD 
  hydrALAZINE (APRESOLINE) 20 mg/mL injection 5 mg, 5 mg, IntraVENous, Q4H PRN, Disha Hawley MD 
  bumetanide (BUMEX) injection 2 mg, 2 mg, IntraVENous, BID, Disha Hawley MD, 2 mg at 10/28/20 0321   acetaminophen (TYLENOL) tablet 650 mg, 650 mg, Oral, Q4H PRN, Disha Hawley MD 
 
Current Outpatient Medications:  
  tolterodine (DETROL) 2 mg tablet, Take 8 mg by mouth two (2) times a day., Disp: , Rfl:  
  ergocalciferol (ERGOCALCIFEROL) 1,250 mcg (50,000 unit) capsule, Take 1 Cap by mouth every seven (7) days. , Disp: 14 Cap, Rfl: 0 
  icosapent ethyL (VASCEPA) 1 gram capsule, Take 1 Cap by mouth two (2) times daily (with meals). , Disp: 60 Cap, Rfl: 1 
  amLODIPine (NORVASC) 5 mg tablet, Take 5 mg by mouth daily. , Disp: , Rfl:  
  ferrous sulfate (Iron) 325 mg (65 mg iron) tablet, Take  by mouth Daily (before breakfast). , Disp: , Rfl:  
  albuterol sulfate (PROVENTIL IN), Take 1 Puff by inhalation two (2) times daily as needed. , Disp: , Rfl:  
  budesonide-formoteroL (Symbicort) 160-4.5 mcg/actuation HFAA, Take 2 Puffs by inhalation ACB/HS. , Disp: , Rfl:  
  hydrOXYzine HCL (ATARAX) 10 mg tablet, Take  by mouth three (3) times daily as needed. , Disp: , Rfl:  
  warfarin (COUMADIN) 1 mg tablet, Take 4 mg by mouth daily. , Disp: , Rfl:  
  sacubitriL-valsartan (Entresto)  mg tablet, Take 1 Tab by mouth two (2) times a day., Disp: , Rfl:  
  PARoxetine (PAXIL) 30 mg tablet, Take 30 mg by mouth daily. , Disp: , Rfl:  
  gabapentin (NEURONTIN) 300 mg capsule, Take 1 Cap by mouth daily. Max Daily Amount: 300 mg., Disp: 30 Cap, Rfl: 2 
  amoxicillin-clavulanate (Augmentin) 875-125 mg per tablet, Take 1 Tab by mouth two (2) times a day., Disp: 60 Tab, Rfl: 2 
  insulin detemir U-100 (LEVEMIR FLEXTOUCH) 100 unit/mL (3 mL) inpn, 40 units at bedtime, Disp: 1 Adjustable Dose Pre-filled Pen Syringe, Rfl: 2   carvedilol (COREG) 12.5 mg tablet, Take 25 mg by mouth two (2) times daily (with meals). , Disp: , Rfl:  
  aspirin 81 mg tablet, Take 81 mg by mouth daily. , Disp: , Rfl:  
 
Facility-Administered Medications Ordered in Other Encounters:  
  [START ON 11/3/2020] ferumoxytoL (FERAHEME) 510 mg in 0.9% sodium chloride 100 mL, overfill volume 10 mL IVPB, 510 mg, IntraVENous, ONCE, Swati Huerta MD 
[address] [place] [address]  [START ON 11/3/2020] cloNIDine HCL (CATAPRES) tablet 0.1 mg, 0.1 mg, Oral, Q4H PRN, Swati Huerta MD 
[address] [place] [address]  [START ON 11/3/2020] diphenhydrAMINE (BENADRYL) capsule 25 mg, 25 mg, Oral, ONCE, Swati Huerta MD 
  [START ON 11/3/2020] acetaminophen (TYLENOL) tablet 650 mg, 650 mg, Oral, ONCE, Mainor Augustine MD 
  0.9% sodium chloride infusion, 25 mL/hr, IntraVENous, CONTINUOUS, Mainor Augustine MD, Stopped at 10/27/20 3953 Thank you for your referral and allowing me to participate in this patient's care. Aliyah Jay, TEA [place]as[place] [address], [address] Phone: [phone] Fax: [phone] PATIENT CARE TEAM: 
Patient Care Team: 
Fan Corrales, NP as PCP - General (Nurse Practitioner) AHF ATTENDING ADDENDUM Patient was seen and examined in person. Data and notes were reviewed. I have discussed and agree with the plan as noted in the NP note above without further additions. Maikel Romano MD PhD 
Beatriz [place] [phone] [facility]

## 2020-10-28 NOTE — PROGRESS NOTES
1335: pt arrived to unit. Tele placed and verified with monitor, assessment completed on arrival, VSS 
 
1930: Bedside and Verbal shift change report given to 1812 Cristal Phoenix (oncoming nurse) by Charly Wallace (offgoing nurse). Report included the following information SBAR, Kardex, Intake/Output, MAR, Recent Results and Cardiac Rhythm paced. Problem: Diabetes Self-Management Goal: *Disease process and treatment process Description: Define diabetes and identify own type of diabetes; list 3 options for treating diabetes. 10/28/2020 1535 by Adalberto Reliance Outcome: Progressing Towards Goal 
10/28/2020 1534 by Adalberto Reliance Outcome: Progressing Towards Goal 
Goal: *Incorporating nutritional management into lifestyle Description: Describe effect of type, amount and timing of food on blood glucose; list 3 methods for planning meals. 10/28/2020 1535 by Adalberto Reliance Outcome: Progressing Towards Goal 
10/28/2020 1534 by Adalberto Reliance Outcome: Progressing Towards Goal 
Goal: *Incorporating physical activity into lifestyle Description: State effect of exercise on blood glucose levels. 10/28/2020 1535 by Adalberto Reliance Outcome: Progressing Towards Goal 
10/28/2020 1534 by Adalberto Reliance Outcome: Progressing Towards Goal 
Goal: *Developing strategies to promote health/change behavior Description: Define the ABC's of diabetes; identify appropriate screenings, schedule and personal plan for screenings. 10/28/2020 1535 by Adalberto Reliance Outcome: Progressing Towards Goal 
10/28/2020 1534 by Adalberto Reliance Outcome: Progressing Towards Goal 
Goal: *Using medications safely Description: State effect of diabetes medications on diabetes; name diabetes medication taking, action and side effects. 10/28/2020 1535 by Adalberto Reliance Outcome: Progressing Towards Goal 
10/28/2020 1534 by Adalberto Reliance Outcome: Progressing Towards Goal 
Goal: *Monitoring blood glucose, interpreting and using results Description: Identify recommended blood glucose targets  and personal targets. 10/28/2020 1535 by Gretchen Carmona Outcome: Progressing Towards Goal 
10/28/2020 1534 by Gretchen Carmona Outcome: Progressing Towards Goal 
Goal: *Prevention, detection, treatment of acute complications Description: List symptoms of hyper- and hypoglycemia; describe how to treat low blood sugar and actions for lowering  high blood glucose level. 10/28/2020 1535 by Gretchen Carmona Outcome: Progressing Towards Goal 
10/28/2020 1534 by Gretchen Carmona Outcome: Progressing Towards Goal 
Goal: *Prevention, detection and treatment of chronic complications Description: Define the natural course of diabetes and describe the relationship of blood glucose levels to long term complications of diabetes. 10/28/2020 1535 by Gretchen Carmona Outcome: Progressing Towards Goal 
10/28/2020 1534 by Gretchen Carmona Outcome: Progressing Towards Goal 
Goal: *Developing strategies to address psychosocial issues Description: Describe feelings about living with diabetes; identify support needed and support network 10/28/2020 1535 by Gretchen Carmona Outcome: Progressing Towards Goal 
10/28/2020 1534 by Gretchen Carmona Outcome: Progressing Towards Goal 
  
Problem: Patient Education: Go to Patient Education Activity Goal: Patient/Family Education Outcome: Progressing Towards Goal 
  
Problem: Pressure Injury - Risk of 
Goal: *Prevention of pressure injury Description: Document Jaime Scale and appropriate interventions in the flowsheet. 10/28/2020 1535 by Gretchen Carmona Outcome: Progressing Towards Goal 
Note: Pressure Injury Interventions: 
Sensory Interventions: Pressure redistribution bed/mattress (bed type) Moisture Interventions: Absorbent underpads Activity Interventions: Pressure redistribution bed/mattress(bed type), Increase time out of bed Mobility Interventions: Pressure redistribution bed/mattress (bed type) Nutrition Interventions: Document food/fluid/supplement intake 10/28/2020 1534 by Shellia Mom Outcome: Progressing Towards Goal 
Note: Pressure Injury Interventions: 
Sensory Interventions: Pressure redistribution bed/mattress (bed type) Moisture Interventions: Absorbent underpads Activity Interventions: Pressure redistribution bed/mattress(bed type), Increase time out of bed Mobility Interventions: Pressure redistribution bed/mattress (bed type) Nutrition Interventions: Document food/fluid/supplement intake Problem: Patient Education: Go to Patient Education Activity Goal: Patient/Family Education 10/28/2020 1535 by Shellia Mom Outcome: Progressing Towards Goal 
10/28/2020 1534 by Shellia Mom Outcome: Progressing Towards Goal 
  
Problem: Discharge Planning Goal: *Discharge to safe environment 10/28/2020 1535 by Shellia Mom Outcome: Progressing Towards Goal 
10/28/2020 1534 by Shellia Mom Outcome: Progressing Towards Goal 
  
Problem: Patient Education: Go to Patient Education Activity Goal: Patient/Family Education Outcome: Progressing Towards Goal 
  
Problem: Heart Failure: Day 1 Goal: Activity/Safety Outcome: Progressing Towards Goal 
Goal: Consults, if ordered Outcome: Progressing Towards Goal 
Goal: Diagnostic Test/Procedures Outcome: Progressing Towards Goal 
Goal: Nutrition/Diet Outcome: Progressing Towards Goal 
Goal: Discharge Planning Outcome: Progressing Towards Goal 
Goal: Medications Outcome: Progressing Towards Goal 
Goal: Respiratory Outcome: Progressing Towards Goal 
Goal: Treatments/Interventions/Procedures Outcome: Progressing Towards Goal 
Goal: Psychosocial 
Outcome: Progressing Towards Goal 
Goal: *Oxygen saturation within defined limits Outcome: Progressing Towards Goal 
Goal: *Hemodynamically stable Outcome: Progressing Towards Goal 
Goal: *Optimal pain control at patient's stated goal 
Outcome: Progressing Towards Goal 
Goal: *Anxiety reduced or absent Outcome: Progressing Towards Goal

## 2020-10-28 NOTE — CONSULTS
[address] INITIAL NOTE Presentation Miguel Nguyen is a 78 y.o. female who was seen at the outpatient [facility] for follow of on chronic systolic heart failure. In the clinic she was noted to be in volume overload with concern for cellulitis. She was admitted for IV antibiotics and diuresis. HX: Chronic systolic heart failure s/p LVAD implantation with Heartmate II, Breast Cancer (1992) s/p bilateral mastectomy, endometrial cancer s/p hysterectomy, severe COPD, Depression, A Fib, CVA, s/p R hemiarthroplasty, CKD, pulmonary HTN, Hyperlipidemia, Diabetes DX: RV Failure, volume overload, RLE cellulitis, chronic sternal wound infection TX: ID consult, CT surgery for sternal wound consult, Diuresis Current clinical course has been uncomplicated. Diabetes: Patient has known Type two diabetes, treated with Levemir PTA. A1C 8.4% on admission. Family history positive for diabetes. Consulted by Provider for advanced diabetes nursing assessment and care, specifically related to  
[x] Inpatient management strategy [x] Home management assessment Diabetes-related medical history Acute complications: None Neurological complications Peripheral neuropathy Microvascular disease Nephropathy Macrovascular disease Cerebral vascular accident Diabetes medication history Drug class Currently in use Discontinued Never used Biguanide DDP-4 inhibitor Sulfonylurea Thiazolidinedione GLP-1 RA SGLT-2 inhibitors Basal insulin Levemir 40 units at bedtime Fixed Dose  Combinations Bolus insulin Subjective Patient has a 20+ year history of type two diabetes Patient was seen at the [address] [place] [address] and diabetes was managed internally within the group.  
Does have a PCP, Nila Abad NP who the patient has not seen in quite some time but wishes to follow up with. Has had a chronic sternal wound over the last 3 years- has had multiple I&D, tunneled, wound vac x2 (last removed 2 wks ago) Patient reports the following home diabetes self-care practices: 
Eating pattern 
[x] Breakfast Cinnamon Rasion Bagel, Banana [x] Lunch  Fruit, Cottage cheese, potato roll [x] [facility] [x] Bedtime None 
[x] Snacks Cheese Its, Fruit [x] Beverages Diet CranGrape, Milk, Water Physical activity pattern 
[x] Limited Monitoring pattern 
[x] Breakfast 180-220 Taking medications pattern 
[x] Consistent administration 
[x] Affordable Social determinants of health impacting diabetes self-management practices Concerned that you need to know more about how to stay healthy with diabetes Admitting A1C 8.4% Glucose now 226 Appetite ok GFR 30 Objective Physical exam 
General Alert, oriented and in no acute distress/ill-appearing. Conversant and cooperative. Vital Signs Visit Vitals /60 (BP 1 Location: Right arm, BP Patient Position: At rest) Pulse 82 Temp 98.1 °F (36.7 °C) Resp 24 Ht 5' 7\" (1.702 m) Wt 131 kg (288 lb 12.8 oz) SpO2 100% BMI 45.23 kg/m² Skin  Warm and dry. Acanthosis noted along neckline. No lipohypertrophy or lipoatrophy noted at injection sites Heart   Regular rate and rhythm. No murmurs, rubs or gallops Lungs  Clear to auscultation without rales or rhonchi Extremities Sacral Wound POA, small healing sternal wound, RLE cellulitis Diabetic foot exam: 
  Left Foot Visual Exam: ulcer- LLE very dry, thin, fragile skin, pitting edema Pulse DP: 1+ (weak) Filament test: 3/6 Vibratory sensation: diminished Right Foot Visual Exam: ulcer- RLE cellulitis with weaping skin, pitting edema Pulse DP: 1+ (weak) Filament test: 3/6 Vibratory sensation: diminished DP & PT pulses +2. Laboratory Lab Results Component Value Date/Time  Hemoglobin A1c 8.4 (H) 10/28/2020 03:30 AM  
 
Lab Results Component Value Date/Time LDL Chol Calc (NIH) 123 (H) 10/01/2020 12:00 AM  
 
Lab Results Component Value Date/Time Creatinine 1.38 (H) 10/28/2020 03:30 AM  
 
Lab Results Component Value Date/Time Sodium 142 10/28/2020 03:30 AM  
 Potassium 4.1 10/28/2020 03:30 AM  
 Chloride 110 (H) 10/28/2020 03:30 AM  
 CO2 26 10/28/2020 03:30 AM  
 Anion gap 6 10/28/2020 03:30 AM  
 Glucose 245 (H) 10/28/2020 03:30 AM  
 BUN 24 (H) 10/28/2020 03:30 AM  
 Creatinine 1.38 (H) 10/28/2020 03:30 AM  
 BUN/Creatinine ratio 17 10/28/2020 03:30 AM  
 GFR est AA 46 (L) 10/28/2020 03:30 AM  
 GFR est non-AA 38 (L) 10/28/2020 03:30 AM  
 Calcium 8.5 10/28/2020 03:30 AM  
 Bilirubin, total 0.3 10/28/2020 03:30 AM  
 Alk. phosphatase 93 10/28/2020 03:30 AM  
 Protein, total 6.9 10/28/2020 03:30 AM  
 Albumin 2.9 (L) 10/28/2020 03:30 AM  
 Globulin 4.0 10/28/2020 03:30 AM  
 A-G Ratio 0.7 (L) 10/28/2020 03:30 AM  
 ALT (SGPT) 7 (L) 10/28/2020 03:30 AM  
 
Lab Results Component Value Date/Time ALT (SGPT) 7 (L) 10/28/2020 03:30 AM  
 
 
Factors affecting BG pattern Factor Dose Comments Nutrition: 
Carb-controlled meals 60 grams/meal On cardiac consistent carb diet Drugs: 
IV antibiotics On Zosyn, Vancomycin Pain Sacral pain Infection Sternal wound infection RLE cellulitis Other: CKD GFR 38 Blood glucose pattern Assessment and Plan Nursing Diagnosis Risk for unstable blood glucose pattern Nursing Intervention Domain 4930 Decision-making Support Nursing Interventions Examined current inpatient diabetes control Explored factors facilitating and impeding inpatient management Identified self-management practices impeding diabetes control Explored corrective strategies with patient and responsible inpatient provider Informed patient of rational for insulin strategy while hospitalized Instructed patient in correlation with elevated glucose and wound heeling, A1C and long term complications Evaluation Rachel Ryan is a 78year old female with uncontrolled diabetes on once daily Levemir with LVAD who is admitted for RV failure, volume overload, RLE cellulitis and work-up for chronic sternal wound infection. A1C on admission is elevated at 8.4% and patient reports once daily glucose monitoring. Glucose certainly impacted by impaired insulin delivery due to perfusion, chronic infection and oral intake. Discussed the impact of elevated glucose on wound healing. It will be essential for her to have glucose control at this time due to current wounds and preventions of additional long term complications. Recommendations Recommend: 
Initiate the subcutaneous insulin order set with: 
1. Moderate dose basal insulin: 0.15 units/kg/BID. Would use NPH as this is similar in action/duration to Levemir. 18 units BID NPH.   
a. Increase to 0.2 units/kg BID for fasting glucose over 200 
b. Decrease to 0.1 units/kg BID for fasting glucose under 100 2. Correctional insulin ACHS at resistant sensitivity, start at a glucose of 200 
 
3. Add Low dose bolus insulin. 0.05 units/kg/meal. Hold if patient consumes less than 50% of carbohydrates on meal tray On Discharge: 
1. Insulin adjustment based on glucose pattern in the inpatient setting. 2. Patient to obtain glucose more frequently than fasting. Please check before meals and bedtime to ensure a rise in glucose throughout the day. A1C goal 7-7.5% 2. Patient needs a FUV with PCP within 1-2 weeks of hospital discharge for ongoing diabetes management. Patient does prefer to be seen by PCP which is reasonable at this time. If patient does not have glucose control at PCP, may benefit from specialty care. Billing Code(s) I personally reviewed chart, notes, data and current medications in the medical record, and examined the patient at bedside before making care recommendations.     
 
Thank you for including us in their care. I spent 55 minutes in direct patient care today for this patient. Time includes chart review, face to face with patient and collaboration with interdisciplinary care team. 
 
Addis Strickland, CNS [facility] Access via [facility] [phone]

## 2020-10-28 NOTE — WOUND CARE
Wound Care Note:  
 
New consult placed by physician request for leg wound. Chart shows: 
Admitted for acute on chronic systolic CHF Past Medical History:  
Diagnosis Date  Asthma  Cancer ([facility] [address] breast  
 Cancer ([facility] [address]   
 endometrial  
 Congestive heart failure, unspecified  CRI (chronic renal insufficiency)  Depression  Diabetes ([facility] [address]  Hypertension WBC = 7.0 on 10/28/20 Admitted from Heart Failure clinic Assessment:  
Patient is A&O x 4, communicative, incontinent with some assistance needed in repositioning. Bed: Total Care Diet:  Cardiac regular; 2 gm na, consistent carb 1800 kcal; FR 1800 ml Patient reports some pain in buttock depending on position. Bilateral heels and sacral skin intact and without erythema. Palpable DP pulses bilaterally. 1. POA right anterior lower leg wound measures 1.5 cm x 1.5 cm x 0.2 cm, wound bed is 60% pink and 40% superficial slough, moist, scant serous drainage, wound edges are open, tino-wound with edema and hemosiderin staining. Opticell AG and Optifoam Gentle applied. 2.  POA right medial lower leg with crusted wound measuring 3 cm x 1.5 cm, no drainage, wound edges are open, tino-wound with edema and hemosiderin staining. Left open to air. 3.  POA right buttock with area measuring 6.5 cm x 2.5 cm, wound bed is purple, slightly elevated with dry, flaky skin/crusting, no open area, tino-wound intact, not able to iron with crusted area. Patient has been using zinc oxide. Will switch to hydrocolloid and continue to monitor. 4.  POA left buttock also has a purple area but it is blanchable without any elevation or crusted area. This nurse believes both of these are from chronic moisture. Spoke with Dr. Marlena Ozuna, wound care orders obtained. Patient repositioned supine.  
  
Recommendations:   
Right anterior lower leg wound- Daily cleanse with normal saline, wipe wound bed clean and dry, apply a piece of  Opticell AG and cover. Right buttock- Please maintain Exuderm Hydrocolloid up to one week or change as needed with soiling or rolled edges. Please use adhesive remover wipes when changing. Skin Care & Pressure Prevention: 
Minimize layers of linen/pads under patient to optimize support surface. Turn/reposition approximately every 2 hours and offload heels. Manage incontinence / promote continence Nourishing Skin Cream to dry skin, minimize use of briefs when able Discussed above plan with patient & Deisy Grullon RN Transition of Care: Plan to follow as needed while admitted to hospital. 
 
Beryl Schmidt" Page ANGY Oro, RN, [facility], [facility]. 
office [phone] 
pager [phone] or call  to page

## 2020-10-28 NOTE — NURSE NAVIGATOR
Chart reviewed by Heart Failure Nurse Navigator. Heart Failure database completed. EF:  15-20% ACEi/ARB/ARNi: Entresto 49/51mg Q12hr BB: Coreg 25mg BID Aldosterone Antagonist: contraindicated d/t hyperkalemia Obstructive Sleep Apnea Screening: STOP-BANG score: 
 Referred to Sleep Medicine: previously referred; pt uses cpap CRT ICD implanted. NYHA Functional Class IV on admission. Heart Failure Teach Back in Patient Education. Heart Failure Avoiding Triggers on Discharge Instructions. Cardiologist: Dr. Narayan Bell [address] Post discharge follow up phone call to be made within 48-72 hours of discharge.

## 2020-10-28 NOTE — H&P
[address] [facility] HISTORY AND PHYSICAL Name:  Mike Amos 
MR#:  [id number] :  1950 ACCOUNT #:  [de-identified] ADMIT DATE:  10/27/2020 The patient was seen, evaluated, and admitted by me on 10/27/2020. PRIMARY CARE PHYSICIAN:  Arcenio Mendoza NP 
 
SOURCE OF INFORMATION:  The patient. CHIEF COMPLAINT:  Right lower extremity redness and swelling. 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 This became associated with significant redness and swelling, but no fever, no rigors, no chills. The patient also stated that she has worsening shortness of breath. She was seen by her cardiologist at the heart failure clinic today. She was sent from the heart failure clinic to the emergency room to be evaluated for admission. When the patient arrived at the emergency room, the patient was found to have an elevated troponin level. The LVAD team has already put in some of the home medication for the patient and CT scan of the chest was obtained. The CT scan is showing evidence of sternal wound infection. The patient was started on antibiotics and was referred to the hospitalist service for evaluation for admission. No record of prior admission to this hospital.  The patient denies sick contact or contact with any person with COVID-19 virus infection. The patient stated that she has been taking her medication as prescribed. The patient reported weight gain despite taking her oral medication for the congestive heart failure.  
 
PAST MEDICAL HISTORY:  Breast cancer status post mastectomy, endometrial cancer, type 2 diabetes, depression, hypertension, COPD, chronic kidney disease, ischemic and nonischemic cardiomyopathy status post LVAD placement, bilateral lower extremity venous stasis. ALLERGIES:  NO KNOWN DRUG ALLERGIES. MEDICATIONS: 
1. Albuterol inhalation twice daily as needed. 2.  Norvasc 5 mg daily. 3.  Augmentin 875/25 1 tablet twice daily. 4.  Aspirin 81 mg daily. 5.  Symbicort 160/4.5 2 puffs twice daily. 6.  Coreg 25 mg twice daily. 7.  Ferrous sulfate 325 mg daily. 8.  Neurontin 300 mg daily. 9.  Atarax 10 mg 3 times daily as needed. 10.  Levemir 40 units subcutaneously daily at bedtime. 11.  Paxil 30 mg daily. 12.  Entresto 1 tablet twice daily. 13.  Coumadin dosage as directed. FAMILY HISTORY:  This was reviewed. Her mother had diabetes and heart disease. Her father had heart disease. PAST SURGICAL HISTORY:  This is significant for hysterectomy, mastectomy, hernia repair. SOCIAL HISTORY:  No history of alcohol or tobacco abuse. REVIEW OF SYSTEMS: 
HEAD, EYES, EARS, NOSE, AND THROAT:  No headache, no dizziness, no blurring of vision, no photophobia. RESPIRATORY SYSTEM:  This is positive for shortness of breath. No cough, no hemoptysis. CARDIOVASCULAR SYSTEM:  This is positive for orthopnea. No chest pain, no palpitation. GASTROINTESTINAL SYSTEM:  No nausea or vomiting. No diarrhea. No constipation. GENITOURINARY SYSTEM:  No dysuria, no urgency, and no frequency. All other systems are reviewed and they are negative. PHYSICAL EXAMINATION: 
GENERAL APPEARANCE:  The patient appeared ill, in moderate distress. VITAL SIGNS:  On arrival at the emergency room, temperature 97.9, pulse 67, respiratory rate 22, oxygen saturation 96%, blood pressure not available because of the patient's LVAD. HEENT:  Head:  Normocephalic, atraumatic. Eyes:  Normal eye movement.   No redness, no drainage, no discharge. Ears:  Normal external ears with no evidence of drainage. Nose:  No deformity and no drainage. Mouth and Throat:  No visible oral lesion. NECK:  Neck is supple. Mild JVD. No thyromegaly. CHEST:  Bilateral basilar crackles and few expiratory wheezing. HEART:  LVAD sound noted. ABDOMEN:  Soft, obese, nontender. Normal bowel sounds. CNS:  Alert and oriented x3. No gross focal neurological deficit. EXTREMITIES:  Edema 2+. Pulses 2+ bilaterally. MUSCULOSKELETAL SYSTEM:  No evidence of joint deformity or swelling. SKIN:  Bilateral lower extremity venous stasis noted. Redness of the right lower extremity noted, softness at the LVAD placement suggestive of sternal wound infection noted. PSYCHIATRY:  Normal mood and affect. LYMPHATIC SYSTEM:  No cervical lymphadenopathy. DIAGNOSTIC DATA:  CT scan of the chest without contrast shows findings suggestive of sternal wound infection. LABORATORY DATA:  Hematology:  WBC 8.5, hemoglobin at 9.5, hematocrit 32.5, platelets 954. Lactic acid level 1.0. Chemistry:  Sodium 140, potassium 4.2, chloride 108, CO2 28, glucose 192, BUN 28, creatinine 1.60, calcium 8.7, total bilirubin 0.2, ALT 7, AST 15, alkaline phosphatase 93, total protein at 7.6, albumin level 3.3, globulin at 4.3. Pro-BNP level 1604. ASSESSMENT: 
1. Acute-on-chronic systolic congestive heart failure. 2.  Cellulitis, right lower extremity. 3.  Bilateral lower extremity venous stasis. 4.  Morbid obesity. 5.  Hypertension. 6.  Combined ischemic and nonischemic cardiomyopathy, status post left ventricular assist device placement. 7.  Suspected sternal wound infection. 8.  Anemia. 9.  Chronic kidney disease, stage III. 10.  Chronic obstructive pulmonary disease. 11.  Depression. 12.  Type 2 diabetes with hyperglycemia. PLAN: 
1. Acute-on-chronic systolic congestive heart failure.   Management of acute-on-chronic systolic congestive heart failure as per heart failure specialist/team. 
2.  Cellulitis, right lower extremity. We will start the patient on vancomycin and Zosyn. Infectious Disease consult has been requested. We will await further recommendation from the Infectious Disease consultant. 3.  Bilateral lower extremity venous stasis. Wound Care consult will be requested. The patient is on Coumadin, because of that, we will not order ultrasound of the lower extremity for DVT. 4.  Morbid obesity. Dietary consult will be requested for dietary counseling. 5.  Hypertension. We will continue with preadmission medication. 6.  Combined ischemic and nonischemic cardiomyopathy, status post LVAD. Management as per LVAD team. 
7.  Suspected sternal wound infection. The patient will be started on vancomycin and Zosyn as stated above. Infectious Disease consult has been requested. Thoracic Surgery consult has also been requested. We will await further recommendation from the specialist. 
8.  Anemia, most likely due to chronic disease. We will carry out anemia workup including checking a stool guaiac to rule out occult GI bleed. 9.  Chronic kidney disease, stage III. We will continue to monitor the patient's renal function. 10.  COPD. We will continue with supplemental oxygen and DuoNeb as needed. 11.  Depression. We will resume home medication. 12.  Type 2 diabetes with hyperglycemia. The patient will be placed on sliding scale with insulin coverage. We will check hemoglobin A1c level. 13.  Other Issues:  Code Status: The patient is a full code. The patient is on Coumadin, because of that, there is no need for DVT prophylaxis. FUNCTIONAL STATUS PRIOR TO ADMISSION:  The patient came from home. The patient is ambulatory with a rollator. COVID PRECAUTION:  The patient was wearing a face mask. I was wearing a face mask and gloves for this patient's encounter.  
 
 
 
Aliyah Galarza MD 
 
 
[id number] 
D:  10/28/2020 3:44 
T:  10/28/2020 5:34 JOB #:  [id number] CC:  Mackenzie Small NP

## 2020-10-28 NOTE — PROGRESS NOTES
GMT-BDG-Ljegyp home RUR-20% Moderate Reason for Admission:   Right lower extremity redness and swelling. RUR Score:     20% Moderate PCP: First and Last name:  Luis Manuel Tenorio NP Name of Practice: [facility] Are you a current patient: Yes/No: Yes Approximate date of last visit: NA 
 Can you participate in a virtual visit if needed: NA 
 
Do you (patient/family) have any concerns for transition/discharge? None Plan for utilizing home health:   TBD Current Advanced Directive/Advance Care Plan:  Patient is a Full code, there are no ACP docs on file. Patient's daughter Bessy Dumont [phone] is identified as primary medical decision maker. Transition of Care Plan:      CM met with patient to inform of CM role and to assess needs. Demographic info verified. Patient lives at home alone. She reports independence with self-care and ADLs. Patient has an LVAD, trilogy, rollator, cane and walker. Patient drives but daughter will provide transport home at discharge. Patient states that she has someone to clean her home every other week but otherwise she does not receive any care at home. Preferred pharmacy is [facility]. CM to monitor for any transitional care planning needs.   
 
Silas Avilez, MS

## 2020-10-29 NOTE — DIABETES MGMT
[facility] FOR DIABETES HEALTH 
 
DIABETES FOLLOW UP NOTE Presentation Chandra Lesch is a 78 y.o. female who was seen at the outpatient [facility] 1721 for follow of on chronic systolic heart failure. In the clinic she was noted to be in volume overload with concern for cellulitis. She was admitted for IV antibiotics and diuresis. HX: Chronic systolic heart failure s/p LVAD implantation with Heartmate II, Breast Cancer (1992) s/p bilateral mastectomy, endometrial cancer s/p hysterectomy, severe COPD, Depression, A Fib, CVA, s/p R hemiarthroplasty, CKD, pulmonary HTN, Hyperlipidemia, Diabetes DX: RV Failure, volume overload, RLE cellulitis, chronic sternal wound infection TX: ID consult, CT surgery for sternal wound consult, Diuresis Current clinical course has been uncomplicated. Diabetes: Patient has known Type two diabetes, treated with Levemir PTA. A1C 8.4% on admission. Family history positive for diabetes. Consulted by Provider for advanced diabetes nursing assessment and care, specifically related to  
[x] Inpatient management strategy [x] Home management assessment Diabetes-related medical history Acute complications: None Neurological complications Peripheral neuropathy Microvascular disease Nephropathy Macrovascular disease Cerebral vascular accident Diabetes medication history Drug class Currently in use Discontinued Never used Biguanide DDP-4 inhibitor Sulfonylurea Thiazolidinedione GLP-1 RA SGLT-2 inhibitors Basal insulin Levemir 40 units at bedtime Fixed Dose  Combinations Bolus insulin Subjective Admitting A1C 8.4% Glucose 154-194 NPH 20 units BID 
6 units correctional insulin in the past 24 hours Appetite ok GFR 30 
IV antibiotics started, Blood cultures NGTD Objective Physical exam 
General Alert, oriented and in no acute distress/ill-appearing. Conversant and cooperative. Vital Signs Visit Vitals BP (!) 90/58 (BP 1 Location: Right arm, BP Patient Position: At rest) Pulse 64 Temp 98.4 °F (36.9 °C) Resp 18 Ht 5' 7\" (1.702 m) Wt 135.8 kg (299 lb 6.2 oz) SpO2 93% BMI 46.89 kg/m² Skin  Warm and dry. Acanthosis noted along neckline. No lipohypertrophy or lipoatrophy noted at injection sites Heart   Regular rate and rhythm. No murmurs, rubs or gallops Lungs  Clear to auscultation without rales or rhonchi Extremities Sacral Wound POA, small healing sternal wound, RLE cellulitis Laboratory Lab Results Component Value Date/Time Hemoglobin A1c 8.4 (H) 10/28/2020 03:30 AM  
 
Lab Results Component Value Date/Time LDL Chol Calc (NIH) 123 (H) 10/01/2020 12:00 AM  
 
Lab Results Component Value Date/Time Creatinine 1.64 (H) 10/29/2020 06:11 AM  
 
Lab Results Component Value Date/Time Sodium 143 10/29/2020 06:11 AM  
 Potassium 4.2 10/29/2020 06:11 AM  
 Chloride 110 (H) 10/29/2020 06:11 AM  
 CO2 28 10/29/2020 06:11 AM  
 Anion gap 5 10/29/2020 06:11 AM  
 Glucose 169 (H) 10/29/2020 06:11 AM  
 BUN 26 (H) 10/29/2020 06:11 AM  
 Creatinine 1.64 (H) 10/29/2020 06:11 AM  
 BUN/Creatinine ratio 16 10/29/2020 06:11 AM  
 GFR est AA 38 (L) 10/29/2020 06:11 AM  
 GFR est non-AA 31 (L) 10/29/2020 06:11 AM  
 Calcium 8.0 (L) 10/29/2020 06:11 AM  
 Bilirubin, total 0.3 10/29/2020 06:11 AM  
 Alk. phosphatase 79 10/29/2020 06:11 AM  
 Protein, total 6.7 10/29/2020 06:11 AM  
 Albumin 2.7 (L) 10/29/2020 06:11 AM  
 Globulin 4.0 10/29/2020 06:11 AM  
 A-G Ratio 0.7 (L) 10/29/2020 06:11 AM  
 ALT (SGPT) 7 (L) 10/29/2020 06:11 AM  
 
Lab Results Component Value Date/Time ALT (SGPT) 7 (L) 10/29/2020 06:11 AM  
 
 
Factors affecting BG pattern Factor Dose Comments Nutrition: 
Carb-controlled meals 60 grams/meal On cardiac consistent carb diet Drugs: 
IV antibiotics On Zosyn, Vancomycin Pain Sacral pain Infection Sternal wound infection RLE cellulitis Other: CKD GFR 31 Blood glucose pattern Assessment and Plan Nursing Diagnosis Risk for unstable blood glucose pattern Nursing Intervention Domain 6436 Decision-making Support Nursing Interventions Examined current inpatient diabetes control Explored factors facilitating and impeding inpatient management Identified self-management practices impeding diabetes control Explored corrective strategies with patient and responsible inpatient provider Informed patient of rational for insulin strategy while hospitalized Instructed patient in correlation with elevated glucose and wound heeling, A1C and long term complications Evaluation Anisha Beltran is a 78year old female with uncontrolled diabetes on once daily Levemir with LVAD who is admitted for RV failure, volume overload, RLE cellulitis and work-up for chronic sternal wound infection. A1C on admission is elevated at 8.4% and patient reports once daily glucose monitoring. Glucose certainly impacted by impaired insulin delivery due to perfusion, chronic infection and oral intake. Discussed the impact of elevated glucose on wound healing. It will be essential for her to have glucose control at this time due to current wounds and preventions of additional long term complications. Basal and correctional insulin restarted and glucose in goal of 100-180 in the inpatient setting. Recommendations Recommend: 1. Continue moderate dose basal insulin: NPH 20 units BID (0.15 units/kg/BID). a. Increase to 0.2 units/kg BID for fasting glucose over 200 
b. Decrease to 0.1 units/kg BID for fasting glucose under 100 2. Correctional insulin ACHS at resistant sensitivity, start at a glucose of 200 
 
3. If patient experiencing pre-prandial hyperglycemia, add low dose bolus insulin.  0.05 units/kg/meal. Hold if patient consumes less than 50% of carbohydrates on meal tray On Discharge: 
1. Insulin adjustment based on glucose pattern in the inpatient setting. 2. Patient to obtain glucose more frequently than fasting. Please check before meals and bedtime to ensure a rise in glucose throughout the day. A1C goal 7-7.5% 2. Patient needs a FUV with PCP within 1-2 weeks of hospital discharge for ongoing diabetes management. Patient does prefer to be seen by PCP which is reasonable at this time. If patient does not have glucose control at PCP, may benefit from specialty care. Billing Code(s) I personally reviewed chart, notes, data and current medications in the medical record, and examined the patient at bedside before making care recommendations. Thank you for including us in their care. I spent 15 minutes in direct patient care today for this patient. Time includes chart review, face to face with patient and collaboration with interdisciplinary care team. 
 
[facility] Access via [address] [place] [address] [phone]

## 2020-10-29 NOTE — PROGRESS NOTES
0730: Bedside and Verbal shift change report given to vijay bustamante (oncoming nurse) by Venita Brand rn (offgoing nurse). Report included the following information SBAR, Kardex, Intake/Output, MAR, Recent Results and Cardiac Rhythm paced. 1430: unable to flush PIV. IV removed and RN attempted placement x3, Delilah RN attempted x2. PICC team paged with no answer 1512: pt unable to use LVAD dressing change kit provided in hospital, pt reports it creates a skin rash. Family to bring home dressing change supplies 1551:PICC team paged, message left for return call 
 
1930: Bedside and Verbal shift change report given to 1812 Cristal Phoenix (oncoming nurse) by Ruiz Kaplan (offgoing nurse). Report included the following information SBAR, Kardex, Intake/Output, MAR and Recent Results. Problem: Diabetes Self-Management Goal: *Disease process and treatment process Description: Define diabetes and identify own type of diabetes; list 3 options for treating diabetes. Outcome: Progressing Towards Goal 
Goal: *Incorporating nutritional management into lifestyle Description: Describe effect of type, amount and timing of food on blood glucose; list 3 methods for planning meals. Outcome: Progressing Towards Goal 
Goal: *Incorporating physical activity into lifestyle Description: State effect of exercise on blood glucose levels. Outcome: Progressing Towards Goal 
Goal: *Developing strategies to promote health/change behavior Description: Define the ABC's of diabetes; identify appropriate screenings, schedule and personal plan for screenings. Outcome: Progressing Towards Goal 
Goal: *Using medications safely Description: State effect of diabetes medications on diabetes; name diabetes medication taking, action and side effects. Outcome: Progressing Towards Goal 
Goal: *Monitoring blood glucose, interpreting and using results Description: Identify recommended blood glucose targets  and personal targets.  
Outcome: Progressing Towards Goal 
Goal: *Prevention, detection, treatment of acute complications Description: List symptoms of hyper- and hypoglycemia; describe how to treat low blood sugar and actions for lowering  high blood glucose level. Outcome: Progressing Towards Goal 
Goal: *Prevention, detection and treatment of chronic complications Description: Define the natural course of diabetes and describe the relationship of blood glucose levels to long term complications of diabetes. Outcome: Progressing Towards Goal 
Goal: *Developing strategies to address psychosocial issues Description: Describe feelings about living with diabetes; identify support needed and support network Outcome: Progressing Towards Goal 
Goal: *Patient Specific Goal (EDIT GOAL, INSERT TEXT) Outcome: Progressing Towards Goal 
  
Problem: Patient Education: Go to Patient Education Activity Goal: Patient/Family Education Outcome: Progressing Towards Goal 
  
Problem: Pressure Injury - Risk of 
Goal: *Prevention of pressure injury Description: Document Jaime Scale and appropriate interventions in the flowsheet. Outcome: Progressing Towards Goal 
Note: Pressure Injury Interventions: 
Sensory Interventions: Pressure redistribution bed/mattress (bed type) Moisture Interventions: Absorbent underpads Activity Interventions: Pressure redistribution bed/mattress(bed type) Mobility Interventions: Pressure redistribution bed/mattress (bed type) Nutrition Interventions: Document food/fluid/supplement intake Problem: Patient Education: Go to Patient Education Activity Goal: Patient/Family Education Outcome: Progressing Towards Goal 
  
Problem: Patient Education: Go to Patient Education Activity Goal: Patient/Family Education Outcome: Progressing Towards Goal 
  
Problem: Heart Failure: Day 2 Goal: Activity/Safety Outcome: Progressing Towards Goal 
Goal: Consults, if ordered Outcome: Progressing Towards Goal 
Goal: Diagnostic Test/Procedures Outcome: Progressing Towards Goal 
Goal: Nutrition/Diet Outcome: Progressing Towards Goal 
Goal: Discharge Planning Outcome: Progressing Towards Goal 
Goal: Medications Outcome: Progressing Towards Goal 
Goal: Respiratory Outcome: Progressing Towards Goal 
Goal: Treatments/Interventions/Procedures Outcome: Progressing Towards Goal 
Goal: Psychosocial 
Outcome: Progressing Towards Goal 
Goal: *Oxygen saturation within defined limits Outcome: Progressing Towards Goal 
Goal: *Hemodynamically stable Outcome: Progressing Towards Goal 
Goal: *Optimal pain control at patient's stated goal 
Outcome: Progressing Towards Goal 
Goal: *Anxiety reduced or absent Outcome: Progressing Towards Goal 
Goal: *Demonstrates progressive activity Outcome: Progressing Towards Goal 
  
Problem: Falls - Risk of 
Goal: *Absence of Falls Description: Document Lemboyd Muse Fall Risk and appropriate interventions in the flowsheet. Outcome: Progressing Towards Goal 
Note: Fall Risk Interventions: 
Mobility Interventions: Patient to call before getting OOB Medication Interventions: Teach patient to arise slowly, Patient to call before getting OOB Elimination Interventions: Patient to call for help with toileting needs, Call light in reach Problem: Patient Education: Go to Patient Education Activity Goal: Patient/Family Education Outcome: Progressing Towards Goal

## 2020-10-29 NOTE — PROGRESS NOTES
[address] [facility] [address] [facility] in [place], [place] Heart Failure Inpatient Clinic Note Patient name: Chandra Lesch Patient : 1950 Patient MRN: [id number] Date of service: 10/29/20 Primary care physician: Jailene Zhao NP 
Primary [facility] cardiologist: Mikaela Katz MD 
  
CHIEF COMPLAINT: 
Chief Complaint Patient presents with  
 Skin Problem Chronic systolic heart failure 
  PLAN: 
Admit patient to CVSU for acute RV failure, volume overload and RLE cellulitis · ID consult for RLE cellulitis and chronic sternal wound infection, appreciate recs · Continue antibiotics per ID- IV Zosyn changed to IV cefepime, cont IV Vanc · CT surgery consult to review chest CT for sternal wound infection · Bumex 2mg IV twice daily · Hydralazine 5mg IV q4h prn MAP >90 
· Trend daily: CBC, CMP, pro-NT-BNP, mag, LD, INR · procalcitonin 0.05, sed rate 59 · Blood cultures (10/27/20) NGTD · TTE (10/27/20) LVEF 15-20% LVIDd 7.26cm RVIDd 2.82cm Tapse 1.14cm · D/w Dr. Johnson Jones, hospitalist service Continue current device speed 9400rpm; increase speed once MAPs under control Continue current medical therapy for heart failure Continue current dose of coreg 25mg twice daily Continue entresto to 49/51mg twice daily Norvasc to 2.5mg daily No spironolactone due to hyperkalemia Magnesium oxide 400mg daily, check Mg with labs Patient states she has reaction to allopurinol (leg edema), will hold and try colchicine or uloric Continue synthroid, TSH & Free T4 at goal 
vitamin D,  high dose weekly x 12 weeks Chronic anticoagulation with coumadin, dosage per pharmacy (goal 2-2.5) Check iron profile/ferritin after second venofer infusion Continue baby ASA Patient not taking statin, retrieve medical records for reason ICD interrogation per routine Continue CPAP for JED   
DTC consult recs appreciated, HgbA1C -8.4% NPH and SSI per DTC recs Note: Needs multiple referrals prior to hospital discharge: nutritionist, GYN and urogynecologist, pulmonologist, EP cardiologist; also patient needs flu shot and review records if has pneumonia vaccine at [facility]: 
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 Radha Fritz at [facility]) · C/b delayed extubation due to severe COPD 
· C/b critical illness polyneuropathy · C/b prolonged hospitalization post-LVAD, 55 days · C/b sternal wound infection s/p debridement (by Dr. Radha Fritz) s/p wound vac · C/b sacral ulcer · Would culture positive for Staph aureus, not MRSA · C/b LVAD site drainage, improved S/p CRT-ICD · ICD fired due to electrolyte imbalance (2011) H/o breast cancer (1992) · s/p bilateral mastectomy/chemo and endometrial cancer s/p hysterectomy · Lymphedema of LLE due to cancer treatment Severe COPD with FEV1 50% Depression Atrial fibrillation H/o \"two mini strokes\" S/p fall with hip facture · Right hip hemmiarthroplasty (5/23/18) by Dr. Favian Jose) · S/p removed hip hardwarare due to pain (4/15/19) COPD severe CKD, stage 3 Hyperkalemia Pulmonary hypertension Cardiac risk factors: · Morbid obesity, Body mass index is 46.89 kg/m². · DM2 insulin dependent · JED on CPAP 
· HL Urinary incontinence, severe · no procedures due to anticoagulation · conservative management with Detrol 4 pills bid Endometrial cancer (2002) HTN 
HL 
  
CARDIAC IMAGING: 
Echo (9/24/19) LVEF 20-25%, AV opens 1:1, no AR Echo (5/29/18) LVEF 10-%, ramps study done, report in epic Echo (18) ramp study done, LVEDD 7.1cm [facility] (18) 2 vessel disease with 90% OM, 80% PDA, DSA to PDA branch 
  
HEMODYNAMICS: 
RHC not done CPEST not done 6MW not done 
  
OTHER IMAGING: 
EGD/C-scope (19) no active bleeding and polyp removed. 
  
FAMILY HISTORY: 
Mother  76 years, diagnosed with DM, HTN, CAD Father  [de-identified]years old, HTN, CAD, MI 
Positive for DM and heart disease in the family, brother with valve replacement 
  
SOCIAL HISTORY: 
Never alcohol, never smoked, , lives alone, no illicit drug use, lives in house, ambulatory status indpendedn, children: daughter, occupation retired Lives alone. 
  
ALLERGIES: benzodiazepines and quetiapine fumerate (lethargy, resp failure to both) 
  
HISTORY OF PRESENT ILLNESS: 
I had the pleasure of seeing Iraida Cruz in [address] [place] [address] at [address] [place] [address] in [address] [place] [address], Iraida Cruz is a 78 y.o. female with multiple comorbidities listed above presents to our clinic requesting transfer of care from [facility] LVAD program. 
  
INTERVAL HISTORY: 
Today, patient presents for second clinic visit accompanied by her daughter. 
  
Patient is doing fairly well. She is content with her quality of life despite many medical problems. She enjoys meeting friends and going out to [facility]. Patient reports \"reaction to allopurinol\". She noticed 5 days of RLE swelling and edema. She had no fever. 
  
Patient walked to our clinic with a walker for stability. Patient can walk short distance without dyspnea. Patient feels quite tired recently, which is new. Her MAP in clinic is in high 90s. Patient can perform home activities without problem and routinely participates in daily walking for more than 15 minutes. Patient would like to remain independent as long as she can; she lives alone. 
  
Patient states she also has more abdominal bloating. Patient's weight today is 289lbs, but she did not take her weights before.   
Patient denies orthopnea, PND or nocturia. Sleeps in recliner. 
  
Patient denies irregular heart rate or palpitations. ICD has not fired this year. 
  
Patient denies other cardiac symptoms such as chest pain or leg pain with walking.  
  
Patient is compliant with fluid restriction and taking medications as prescribed. Patient manages his own medications. REVIEW OF SYSTEMS: 
General: Denies fever, night sweats, unhappy about being in ED overnight Ear, nose and throat: Denies difficulty hearing, sinus problems, runny nose, post-nasal drip, ringing in ears, mouth sores, loose teeth, ear pain, nosebleeds, sore throate, facial pain or numbess Cardiovascular: see above in the interval history Respiratory: denies cough, wheezing, sputum production, or hemoptysis. Gastrointestinal: Denies heartburn, constipation, intolerance to certain foods, diarrhea, abdominal pain, nausea, vomiting, difficulty swallowing, blood in stool Kidney and bladder: Denies painful urination, frequent urination, or urgency Musculoskeletal: Denies joint pain, muscle weakness Skin and hair: Denies change in hair loss or increase, breast changes Redness and edema, skin breakdown RLE. PHYSICAL EXAM: 
Visit Vitals BP (!) 90/58 (BP 1 Location: Right arm, BP Patient Position: At rest) Pulse 65 Temp 98.3 °F (36.8 °C) Resp 18 Ht 5' 7\" (1.702 m) Wt 299 lb 6.2 oz (135.8 kg) SpO2 94% BMI 46.89 kg/m² LVAD Pump Speed (RPM): 9400 Pump Flow (LPM): 4.5 MAP: 69 
PI (Pulsitility Index): 2.5 Power: 5.7  Test: No 
Back Up  at Bedside & Labeled: Yes Power Module Test: No 
Driveline Site Care: No 
Driveline Dressing: Clean, Dry, and Intact Outpatient: No 
MAP in Therapeutic Range (Outpatient): Yes Testing Alarms Reviewed: Yes 
Back up SC speed: 9400 Back up Low Speed Limit: 9000 Emergency Equipment with Patient?: Yes Emergency procedures reviewed?: Yes Drive line site inspected?: No 
Drive line intergrity inspected?: Yes Drive line dressing changed?: No 
 
 
General: Patient is morbidly obese in no acute distress, sitting up in chair HEENT: Normocephalic and atraumatic. No scleral icterus. Pupils are equal, round and reactive to light and accomodation. No conjunctival injection. Oropharynx is clear. Neck: Supple. No evidence of thyroid enlargements or lymphadenopathy. JVD: Cannot be appreciated Lungs: Breath sounds are equal and clear bilaterally. No wheezes, rhonchi, or rales. Heart: VAD hum Abdomen: Soft, obese, no mass or tenderness. No organomegaly or hernia. Bowel sounds present. Genitourinary and rectal: deferred Extremities: No cyanosis, clubbing, Redness and edema, skin breakdown RLE; BLE edema 3+, venous stasis Neurologic: No focal sensory or motor deficits are noted. Grossly intact. Psychiatric: Awake, alert and oriented x 3. Appropriate mood and affect. Skin: Warm, dry, no nodules + petechiae on extremities PAST MEDICAL HISTORY: 
Past Medical History:  
Diagnosis Date  Asthma  Cancer ([facility] breast  
 Cancer ([facility]   
 endometrial  
 Congestive heart failure, unspecified  CRI (chronic renal insufficiency)  Depression  Diabetes ([facility]  Hypertension PAST SURGICAL HISTORY: 
Past Surgical History:  
Procedure Laterality Date  HX HERNIA REPAIR    
 HX HYSTERECTOMY  HX MASTECTOMY FAMILY HISTORY: 
History reviewed. No pertinent family history. SOCIAL HISTORY: 
Social History Socioeconomic History  Marital status:  Spouse name: Not on file  Number of children: Not on file  Years of education: Not on file  Highest education level: Not on file Tobacco Use  Smoking status: Never Smoker  Smokeless tobacco: Never Used Substance and Sexual Activity  Alcohol use: Not Currently LABORATORY RESULTS: 
Labs Latest Ref Rng & Units 10/29/2020 10/28/2020 10/27/2020 10/1/2020 WBC 3.6 - 11.0 K/uL - 7.0 8.5 7.2 RBC 3.80 - 5.20 M/uL - 3.57(L) 3.81 3.75(L) Hemoglobin 11.5 - 16.0 g/dL - 8. 8(L) 9.5(L) 9.3(L) Hematocrit 35.0 - 47.0 % - 30. 2(L) 32. 5(L) 29. 7(L) MCV 80.0 - 99.0 FL - 84.6 85.3 79 Platelets 572 - 242 K/uL - 189 218 225 Lymphocytes 12 - 49 % - 15 17 - Monocytes 5 - 13 % - 5 5 - Eosinophils 0 - 7 % - 3 3 - Basophils 0 - 1 % - 0 1 - Albumin 3.5 - 5.0 g/dL 2. 7(L) 2. 9(L) 3. 3(L) 3.8 Calcium 8.5 - 10.1 MG/DL 8. 0(L) 8.5 8.7 9.0 Glucose 65 - 100 mg/dL 169(H) 245(H) 192(H) 182(H) BUN 6 - 20 MG/DL 26(H) 24(H) 28(H) 27 Creatinine 0.55 - 1.02 MG/DL 1.64(H) 1.38(H) 1.60(H) 1.32(H) Sodium 136 - 145 mmol/L 143 142 140 140 Potassium 3.5 - 5.1 mmol/L 4.2 4.1 4.2 5.4(H) TSH 0.450 - 4.500 uIU/mL - - - 2.980 LDH 81 - 246 U/L 309(H) - 324(H) 227(H) Some recent data might be hidden ALLERGY: 
No Known Allergies CURRENT MEDICATIONS: 
 
Current Facility-Administered Medications:  
  bumetanide (BUMEX) tablet 2 mg, 2 mg, Oral, BID, Mihaela Christiansen NP 
  warfarin (COUMADIN) tablet 4 mg, 4 mg, Oral, ONCE, Festus Ribeiro MD 
  cefepime (MAXIPIME) 2 g in 0.9% sodium chloride (MBP/ADV) 100 mL, 2 g, IntraVENous, Q24H, Puneet Méndez MD 
  ammonium lactate (LAC-HYDRIN) 12 % lotion, , Topical, BID, Oly Belle, TEA 
  albuterol (PROVENTIL VENTOLIN) nebulizer solution 2.5 mg, 2.5 mg, Nebulization, BID PRN, Disha Hawley MD 
  sodium chloride (NS) flush 5-40 mL, 5-40 mL, IntraVENous, Q8H, Disha Hawley MD, 10 mL at 10/29/20 1423   sodium chloride (NS) flush 5-40 mL, 5-40 mL, IntraVENous, PRN, Disha Hawley MD 
  polyethylene glycol (MIRALAX) packet 17 g, 17 g, Oral, DAILY PRN, Disha Hawley MD 
  glucose chewable tablet 16 g, 4 Tab, Oral, PRN, Disha Hawley MD 
  glucagon (GLUCAGEN) injection 1 mg, 1 mg, IntraMUSCular, PRN, Disha Curiel MD 
  Vancomycin dosing per pharmacy, , Other, Rx Dosing/Monitoring, MD Nadia Gonzalez.Prows vancomycin (VANCOCIN) 2000 mg in  ml infusion, 2,000 mg, IntraVENous, Q24H, Disha Hawley MD, Last Rate: 250 mL/hr at 10/29/20 0540, 2,000 mg at 10/29/20 0540 
  amLODIPine (NORVASC) tablet 2.5 mg, 2.5 mg, Oral, DAILY, Stafford Balzarine, NP, 2.5 mg at 10/29/20 4448   sodium chloride (NS) flush 5-40 mL, 5-40 mL, IntraVENous, Q8H, Stafford Balzarine E, NP, 10 mL at 10/29/20 1423   sodium chloride (NS) flush 5-40 mL, 5-40 mL, IntraVENous, PRN, Veronica Balzarine, NP 
  Warfarin - Pharmacy to Dose, , Other, Rx Dosing/Monitoring, Troy Ward MD 
  magnesium oxide (MAG-OX) tablet 400 mg, 400 mg, Oral, DAILY, Stafford Balzarine, NP, 400 mg at 10/29/20 0128   dextrose 10% infusion 0-250 mL, 0-250 mL, IntraVENous, PRN, Veronica Balzarine, NP 
  insulin NPH (NOVOLIN N, HUMULIN N) injection 20 Units, 0.15 Units/kg, SubCUTAneous, BID, Stafford Balzarine, NP, 20 Units at 10/29/20 2178 
  insulin lispro (HUMALOG) injection, , SubCUTAneous, AC&HS, Stafford Balzarine, NP, 2 Units at 10/29/20 1201   aspirin delayed-release tablet 81 mg, 81 mg, Oral, DAILY, Disha Hawley MD, 81 mg at 10/29/20 0823 
  arformoterol 15 mcg/budesonide 0.5 mg neb solution, , Nebulization, BID RT, Disha Hawley MD 
  carvediloL (COREG) tablet 25 mg, 25 mg, Oral, BID WITH MEALS, Disha Hawley MD, 25 mg at 10/29/20 5644   ergocalciferol capsule 50,000 Units, 50,000 Units, Oral, Q7D, Disha Hawley MD, 50,000 Units at 10/29/20 7481   ferrous sulfate tablet 325 mg, 1 Tab, Oral, DAILY WITH BREAKFAST, Disha Hawley MD, 325 mg at 10/29/20 0823 
  gabapentin (NEURONTIN) capsule 300 mg, 300 mg, Oral, DAILY, Disha Hawley MD, 300 mg at 10/29/20 3520   hydrOXYzine HCL (ATARAX) tablet 10 mg, 10 mg, Oral, TID PRN, Disha Hawley MD 
  icosapent ethyL (VASCEPA) capsule 1 Cap, 1 Cap, Oral, BID WITH MEALS, Disha Hawley MD 
  PARoxetine (PAXIL) tablet 30 mg, 30 mg, Oral, DAILY, Jana Venegas MD, 30 mg at 10/29/20 4814 
  sacubitriL-valsartan (ENTRESTO)  mg tablet 1 Tab, 1 Tab, Oral, BID, Jana Venegas MD, 1 Tab at 10/29/20 8330   tolterodine (DETROL) tablet 8 mg, 8 mg, Oral, BID, Disha Hawley MD 
  hydrALAZINE (APRESOLINE) 20 mg/mL injection 5 mg, 5 mg, IntraVENous, Q4H PRN, Disha Hawley MD 
  acetaminophen (TYLENOL) tablet 650 mg, 650 mg, Oral, Q4H PRN, Disha Hawley MD 
 
Facility-Administered Medications Ordered in Other Encounters:  
  [START ON 11/3/2020] ferumoxytoL (FERAHEME) 510 mg in 0.9% sodium chloride 100 mL, overfill volume 10 mL IVPB, 510 mg, IntraVENous, ONCE, Lucero Singh MD 
[address] [place] [address]  [START ON 11/3/2020] cloNIDine HCL (CATAPRES) tablet 0.1 mg, 0.1 mg, Oral, Q4H PRN, Lucero Singh MD 
[address] [place] [address]  [START ON 11/3/2020] diphenhydrAMINE (BENADRYL) capsule 25 mg, 25 mg, Oral, ONCE, Lucero Singh MD 
[address] [place] [address]  [START ON 11/3/2020] acetaminophen (TYLENOL) tablet 650 mg, 650 mg, Oral, ONCE, Lucero Singh MD 
 
Thank you for your referral and allowing me to participate in this patient's care. TEA Herrera [address], [address] Phone: [phone] Fax: [phone] PATIENT CARE TEAM: 
Patient Care Team: 
Amber Leija NP as PCP - General (Nurse Practitioner) AHF ATTENDING ADDENDUM Patient was seen and examined in person. Data and notes were reviewed. I have discussed and agree with the plan as noted in the NP note above without further additions. Jagdish Patel MD PhD 
Beatriz Villasenor [address] [place] [address]

## 2020-10-29 NOTE — PROGRESS NOTES
Admission Medication Reconciliation: 
 
Information obtained from:  patient, RX query RxQuery data available¹:  YES Comments/Recommendations: Updated PTA meds/reviewed patient's allergies. 1)  patient is a good historian 2)  Medication changes (since last review): Added 
- n/a Adjusted 
- ferrous sulfate to 325mg every day, albuterol inhaler to qid prn Removed 
- n/a 
 
3)  verified tolterodine and Entresto doses ¹RxQuery pharmacy benefit data reflects medications filled and processed through the patient's insurance, however  
this data does NOT capture whether the medication was picked up or is currently being taken by the patient. Allergies:  Patient has no known allergies. Significant PMH/Disease States:  
Past Medical History:  
Diagnosis Date Asthma Cancer ([facility] breast  
 Cancer ([facility]   
 endometrial  
 Congestive heart failure, unspecified CRI (chronic renal insufficiency) Depression Diabetes ([facility] Hypertension Chief Complaint for this Admission: Chief Complaint Patient presents with Skin Problem Prior to Admission Medications:  
Prior to Admission Medications Prescriptions Last Dose Informant Taking? ENTRESTO 97 MG-103 MG TABLET   Yes Sig: Take 1 Tab by mouth two (2) times a day. PARoxetine (PAXIL) 30 mg tablet   Yes Sig: Take 30 mg by mouth daily. albuterol sulfate (PROVENTIL IN)   Yes Sig: Take 1 Puff by inhalation four (4) times daily as needed. amLODIPine (NORVASC) 5 mg tablet   Yes Sig: Take 5 mg by mouth daily. aspirin 81 mg tablet   Yes Sig: Take 81 mg by mouth daily. budesonide-formoteroL (Symbicort) 160-4.5 mcg/actuation HFAA   Yes Sig: Take 2 Puffs by inhalation ACB/HS. carvediloL (COREG) 25 mg tablet   Yes Sig: Take 25 mg by mouth two (2) times daily (with meals). ergocalciferol (ERGOCALCIFEROL) 1,250 mcg (50,000 unit) capsule   Yes Sig: Take 1 Cap by mouth every seven (7) days.   
ferrous sulfate (Iron) 325 mg (65 mg iron) tablet   Yes Sig: Take 325 mg by mouth Daily (before breakfast). gabapentin (NEURONTIN) 300 mg capsule   Yes Sig: Take 1 Cap by mouth daily. Max Daily Amount: 300 mg.  
hydrOXYzine HCL (ATARAX) 10 mg tablet   No  
Sig: Take 10 mg by mouth three (3) times daily as needed for Itching (hives). icosapent ethyL (VASCEPA) 1 gram capsule   No  
Sig: Take 1 Cap by mouth two (2) times daily (with meals). insulin detemir U-100 (LEVEMIR FLEXTOUCH) 100 unit/mL (3 mL) inpn   No  
Si units at bedtime  
tolterodine (DETROL) 2 mg tablet 10/27/2020 at Unknown time  Yes Sig: Take 8 mg by mouth two (2) times a day. warfarin (COUMADIN) 1 mg tablet   Yes Sig: Take 4 mg by mouth daily. Facility-Administered Medications: None Please contact the main inpatient pharmacy with any questions or concerns at [phone] and we will direct you to the clinical pharmacist covering this patient's care while in-house.   
Laquita Ahumada, [facility]

## 2020-10-29 NOTE — PROGRESS NOTES
1930: Bedside shift change report given to [address] [place] [address] [facility] by Brayan (offgoing nurse). Report included the following information SBAR, Intake/Output, MAR, Accordion, Recent Results, Med Rec Status and Cardiac Rhythm Paced. 0740: Bedside shift change report given [facility] by [address] [place] [address] (offgoing nurse). Report included the following information SBAR, Intake/Output, MAR, Accordion, Recent Results, Med Rec Status and Cardiac Rhythm Paced.

## 2020-10-29 NOTE — PROGRESS NOTES
Pharmacist Note  Warfarin Dosing Consult provided for this 78 y. o.female to manage warfarin for Atrial Fibrillation, LVAD INR Goal: 2 - 2.5 Home regimen/ tablet size: 4mg qd Drugs that may increase INR: None Drugs that may decrease INR: None Other current anticoagulants/ drugs that may increase bleeding risk: None Risk factors: Age > 65 and Decompensated Heart Failure Daily INR ordered: YES Recent Labs 10/29/20 
0611 10/28/20 
1119 10/28/20 
0400 10/28/20 
0330 10/27/20 
1831 HGB  --   --   --  8.8* 9.5* INR 2.6* 2.7* 2.7*  --   --   
 
Date               INR                  Dose 
10/26  2.9  4 mg  
10/27  --  --  
10/28  2.7  4 mg  
10/29  2.6  4 mg Assessment/ Plan: Will order warfarin 4 mg PO x 1 dose. Pharmacy will continue to monitor daily and adjust therapy as indicated. Please contact the pharmacist at x [phone] for outpatient recommendations if needed.

## 2020-10-29 NOTE — CONSULTS
Infectious Disease Consult Today's Date: 10/29/2020 Admit Date: 10/27/2020 Impression:  
Right lower extremity cellulitis in presence of venous stasis ? Sternal wound infection 
- blood cx (10/27) no growth so far Please send wound cx from sternal and right lower extremity wound LVAD/ICD Acute on chronic systolic heart failure - cardiology following EF 15-20% Type III DM with neuropathy - A1C 8.4. continue with insulin therapy Continue with neurontin ROCIO on CKD 
- creat 1.38->1.64; continue to monitor Management per primary care team 
 
Plan:  
IV Zosyn changed to IV cefepime Continue with IV Vancomycin Wound care management per wound care team 
Will adjust ABX prn Will follow up on wound and blood cx results Anti-infectives: · IV Zosyn (8/28 to present) and IV vancomycin (8/28 to present) Subjective:  
Date of Consultation:  October 29, 2020 Referring Physician: Dr. Doe Madden Patient is a 78 y.o. female was admitted to the hospital on 10/27 with the worsening of right lower extremity cellulitis and sternal wound infection. Patient has been receiving her medical care at the [address] [place] [address], this is her first time admission to [facility]. Pt's medical history include CAD, chronic systolic heart failure, cardiomyopathy, s/p LVAD and ICD, hx of breast (s/p mastoidectomies, chemo) and endometrial cancer (s/p hysterectomy), type II DM, and COPD. Pt has been dealing with sternal wound infection for the past couple months. Pt informed me that the wound has gotten smaller and there's not much drainage at this time. Pt punctured Right anterior lower leg while using the grabber reacher tool about couple weeks ago, initially there's a lot of purulent drainage which brought her to the hospital. No associated fever/chills, no unusual numbness/tingling sensation. Her sensation has not been 'good' for sometimes per pt. ID team was consulted for right lower extremity cellulitis.  
 
Patient Active Problem List  
Diagnosis Code  Obesity, morbid ([facility] E66.01  
 Acute on chronic systolic CHF (congestive heart failure) ([facility]) I50.23 Past Medical History:  
Diagnosis Date  Asthma  Cancer ([facility] breast  
 Cancer ([facility]   
 endometrial  
 Congestive heart failure, unspecified  CRI (chronic renal insufficiency)  Depression  Diabetes ([facility]  Hypertension History reviewed. No pertinent family history. Social History Tobacco Use  Smoking status: Never Smoker  Smokeless tobacco: Never Used Substance Use Topics  Alcohol use: Not Currently Past Surgical History:  
Procedure Laterality Date  HX HERNIA REPAIR    
 HX HYSTERECTOMY  HX MASTECTOMY Prior to Admission medications Medication Sig Start Date End Date Taking? Authorizing Provider  
tolterodine (DETROL) 2 mg tablet Take 8 mg by mouth two (2) times a day. Yes Provider, Historical  
ergocalciferol (ERGOCALCIFEROL) 1,250 mcg (50,000 unit) capsule Take 1 Cap by mouth every seven (7) days. 10/5/20   Niranjan Limon NP  
icosapent ethyL (VASCEPA) 1 gram capsule Take 1 Cap by mouth two (2) times daily (with meals). 10/5/20   Sandro Limon NP  
amLODIPine (NORVASC) 5 mg tablet Take 5 mg by mouth daily. Provider, Historical  
ferrous sulfate (Iron) 325 mg (65 mg iron) tablet Take  by mouth Daily (before breakfast). Provider, Historical  
albuterol sulfate (PROVENTIL IN) Take 1 Puff by inhalation two (2) times daily as needed. Provider, Historical  
budesonide-formoteroL (Symbicort) 160-4.5 mcg/actuation HFAA Take 2 Puffs by inhalation ACB/HS. Provider, Historical  
hydrOXYzine HCL (ATARAX) 10 mg tablet Take  by mouth three (3) times daily as needed. Provider, Historical  
warfarin (COUMADIN) 1 mg tablet Take 4 mg by mouth daily. Provider, Historical  
sacubitriL-valsartan (Entresto)  mg tablet Take 1 Tab by mouth two (2) times a day.     Provider, Historical PARoxetine (PAXIL) 30 mg tablet Take 30 mg by mouth daily. Provider, Historical  
gabapentin (NEURONTIN) 300 mg capsule Take 1 Cap by mouth daily. Max Daily Amount: 300 mg. 10/1/20   Sofia Walter NP  
amoxicillin-clavulanate (Augmentin) 875-125 mg per tablet Take 1 Tab by mouth two (2) times a day. 10/1/20   Sofia Walter NP  
insulin detemir U-100 (LEVEMIR FLEXTOUCH) 100 unit/mL (3 mL) inpn 40 units at bedtime 10/1/20   Sofia Walter NP  
carvedilol (COREG) 12.5 mg tablet Take 25 mg by mouth two (2) times daily (with meals). Provider, Historical  
aspirin 81 mg tablet Take 81 mg by mouth daily. Provider, Historical  
 
a No Known Allergies REVIEW OF SYSTEMS:    
Total of 12 systems reviewed as follows:  
I am not able to complete the review of systems because: The patient is intubated and sedated The patient has altered mental status due to his acute medical problems The patient has baseline aphasia from prior stroke(s) The patient has baseline dementia and is not reliable historian POSITIVE= underlined text  Negative = text not underlined General:  fever, chills, sweats, generalized weakness, weight loss/gain,  
   loss of appetite Eyes:    blurred vision, eye pain, loss of vision, double vision ENT:    rhinorrhea, pharyngitis Respiratory:   cough, sputum production, SOB, wheezing, HAIRSTON, pleuritic pain  
Cardiology:   chest pain, palpitations, orthopnea, PND, edema, syncope Gastrointestinal:  abdominal pain , N/V, dysphagia, diarrhea, constipation, bleeding Genitourinary:  frequency, urgency, dysuria, hematuria, incontinence Muskuloskeletal :  arthralgia, myalgia Hematology:  easy bruising, nose or gum bleeding, lymphadenopathy Dermatological: rash, ulceration, pruritis Endocrine:   hot flashes or polydipsia Neurological:  headache, dizziness, confusion, focal weakness, paresthesia,    Speech difficulties, memory loss, gait disturbance Psychological: Feelings of anxiety, depression, agitation Objective:  
 
Visit Vitals BP (!) 90/58 (BP 1 Location: Right arm, BP Patient Position: At rest) Pulse 67 Temp 99 °F (37.2 °C) Resp 18 Ht 5' 7\" (1.702 m) Wt 135.8 kg (299 lb 6.2 oz) SpO2 98% BMI 46.89 kg/m² Temp (24hrs), Av °F (36.7 °C), Min:97.6 °F (36.4 °C), Max:99 °F (37.2 °C) PHYSICAL EXAM:  
General:    morbidly obese, Alert, cooperative, no distress, appears older than stated age. HEENT: Atraumatic, anicteric sclerae, pink conjunctivae No oral ulcers, mucosa moist, throat clear Neck:  Supple, symmetrical,  thyroid: non tender Lungs:   Clear in apex with decreased breath sounds at bases, VAD hum,  No Wheezing or Rhonchi. No rales. Chest wall:  No tenderness  No Accessory muscle use. Clear drainage from sternal wound; size - base of Q-tip Heart:   Regular  rhythm,  No  Murmur, dependent pitting edema Abdomen:   Soft, non-tender. Not distended. Bowel sounds normal 
Extremities: No cyanosis. No clubbing Skin:     Not pale. Not Jaundiced, bumpy, dusky below knees, right anterior wound packed with nu guaze, diffuse erythema, not tender to touch Psych:  Fair insight. Not depressed. Not anxious or agitated. Neurologic: EOMs intact. No facial asymmetry. No aphasia or slurred speech. Symmetrical strength, Alert and oriented X 4. Data Review: CBC: 
Recent Labs 10/28/20 
0330 10/27/20 
183 WBC 7.0 8.5 GRANS 77* 73 MONOS 5 5 EOS 3 3 ANEU 5.3 6.3 ABL 1.1 1.4 HGB 8.8* 9.5* HCT 30.2* 32.5*  
 218 BMP: 
Recent Labs 10/29/20 
3517 10/28/20 
0330 10/27/20 
1831 CREA 1.64* 1.38* 1.60* BUN 26* 24* 28*  142 140  
K 4.2 4.1 4.2 * 110* 108 CO2 28 26 28 AGAP 5 6 4* * 245* 192* LFTS: 
Recent Labs 10/29/20 
2706 10/28/20 
0330 10/27/20 
1831 TBILI 0.3 0.3 0.2 ALT 7* 7* 7* AP 79 93 93  
TP 6.7 6.9 7.6 ALB 2.7* 2.9* 3.3*  
 
 Microbiology:  
 
All Micro Results Procedure Component Value Units Date/Time CULTURE, BLOOD, PAIRED [[id number]] Collected:  10/27/20 1845 Order Status:  Completed Specimen:  Blood Updated:  10/29/20 0557 Special Requests: NO SPECIAL REQUESTS Culture result: NO GROWTH 2 DAYS Imaging:  
Chest CT (10/27): In the midline inferior to the sternum and expected location of the manubrium 
and apparently socially with skin defect is a subcutaneous oval-shaped density 
measuring between 35 and 54 Hounsfield units. This has well-defined margins 
without adjacent stranding. This could represent high density infected fluid. This could represent granulation tissue this could represent a combination of 
both. This does extend down to the level of the left ventricular assist device 
outflow tract anteriorly. There is no associated gas collection. Slightly more 
superiorly and along the anterior aspect of the outflow tract cannula is a small 
triangular area of soft tissue density measuring 54 Hounsfield units that could 
represent granulation tissue or inflammatory reaction. This is not associated 
with fluid density or air density Signed By: Ruth Nixon NP October 29, 2020

## 2020-10-29 NOTE — PROGRESS NOTES
[facility] [facility]  Hospitalist Group Hospitalist Progress Note Dara Cerna MD 
Answering service: [phone] OR [phone] from in house phone Date of Service:  10/29/2020 NAME:  Gena King :  1950 MRN:  [id number] Admission Summary: This is a 78year old female with a significant past medical history of chronic systolic heart failure due to ischemic cardiomyopathy with implanted left ventricular assist device. Other significant PMH:  type 2 diabetes, hypertension, obesity, COPD, depression invasion Interval history / Subjective:  
Patient is seen and examined. Feels pretty well in herself. No acute events. RLE was oozing clear fluid- due to severe edema on admission. Not oozing anymore now. Feels ok with no sig sob/cp/dizziness/ n/v. Assessment & Plan:  
Acute-on-chronic systolic congestive heart failure,, NYHA class IV. Ischemic cardiomyopathy- s/p LVAD 
-Being diuresed with IV Bumex. Continue other cardiac regimen: Carvedilol, Entresto. 
-Daily weight. Strict input and output.-Further management by advanced CHF team. 
 
Suspect sternal wound infection: Antibiotics as above. Cardiac surgery consulted Cellulitis of RLE: background of venous stasis and small wound - Continue IV Zosyn and vancomycin. -ID following, blood cultures 10/27 NGTD 
 
HTN: chronic, stable, Home regimen, PRN BP meds. Monitor Chronic normocytic anemia. Hemoglobin stable between 89, iron/B12/folate wnl Type 2 diabetes with hyperglycemia: SSI- NPH 20 units twice daily. monitor COPD: stable-Continue maintenance inhaled steroids. Bronchodilators as needed. CKD 3, appears baseline Depression, stable. Continue  Paxil Morbid obesityBody mass index is 46.89 kg/m². Counseled on weight loss Code status: Full code DVT prophylaxis: On warfarin Care Plan discussed with: Patient/Family and Nurse Anticipated Disposition: Home w/Family Anticipated Discharge: Greater than 48 hours Hospital Problems  Date Reviewed: 10/28/2020 Codes Class Noted POA * (Principal) Acute on chronic systolic CHF (congestive heart failure) (HCC) ICD-10-CM: F59.10 ICD-9-CM: 428.23, 428.0  10/27/2020 Yes Review of Systems: A comprehensive review of systems was negative except for that written in the HPI. Vital Signs:  
 Last 24hrs VS reviewed since prior progress note. Most recent are: 
Visit Vitals BP (!) 90/58 (BP 1 Location: Right arm, BP Patient Position: At rest) Pulse 67 Temp 99 °F (37.2 °C) Resp 18 Ht 5' 7\" (1.702 m) Wt 135.8 kg (299 lb 6.2 oz) SpO2 98% BMI 46.89 kg/m² Intake/Output Summary (Last 24 hours) at 10/29/2020 9368 Last data filed at 10/29/2020 0962 Gross per 24 hour Intake 1320 ml Output 800 ml Net 520 ml Physical Examination:  
 
 
     
Constitutional:  Patient's lying flat in bed without any distress, on room air. HEENT:  Atraumatic. Oral mucosa moist,. Non icteric sclera. No pallor. Resp:  On room air. CTA bilaterally. No wheezing/rhonchi/rales. No accessory muscle use Chest Wall: No deformity CV:  Regular rhythm, normal rate, no murmurs, gallops, rubs GI:  Obese. LVAD exit to the epigastric area. Soft, non distended, non tender. normoactive bowel sounds, no hepatosplenomegaly :  No CVA or suprapubic tenderness Musculoskeletal:  Purplish erythema on the right lower extremity. Small superficial wound without discharge. Neurologic:  Mental status:AAOx3,  
Cranial nerves II-XII : WNL Motor exam:Moves all extremities symmetrically Data Review:  
 Review and/or order of clinical lab test 
Review and/or order of tests in the radiology section of CPT Review and/or order of tests in the medicine section of CPT Labs:  
 
Recent Labs 10/28/20 
0330 10/27/20 
1831 WBC 7.0 8.5 HGB 8.8* 9.5* HCT 30.2* 32.5*  
 218 Recent Labs 10/29/20 
0306 10/28/20 
0330 10/27/20 
1831  142 140  
K 4.2 4.1 4.2 * 110* 108 CO2 28 26 28 BUN 26* 24* 28* CREA 1.64* 1.38* 1.60* * 245* 192* CA 8.0* 8.5 8.7 MG 1.9 2.0  --   
PHOS  --  3.4  --   
 
Recent Labs 10/29/20 
2641 10/28/20 
0330 10/27/20 
1831 ALT 7* 7* 7* AP 79 93 93 TBILI 0.3 0.3 0.2 TP 6.7 6.9 7.6 ALB 2.7* 2.9* 3.3*  
GLOB 4.0 4.0 4.3* Recent Labs 10/29/20 
0611 10/28/20 
1119 10/28/20 
0400 INR 2.6* 2.7* 2.7* PTP 25.9* 27.0* 26.7* APTT 51.3* 52.1*  --   
  
Recent Labs 10/28/20 
0356 10/28/20 
0330 10/27/20 
1831 TIBC 348  --  560* PSAT 98*  --  63* FERR 72 PLEASE DISREGARD RESULTS  --   
  
Lab Results Component Value Date/Time Folate 10.0 10/28/2020 03:56 AM  
  
No results for input(s): PH, PCO2, PO2 in the last 72 hours. No results for input(s): CPK, CKNDX, TROIQ in the last 72 hours. No lab exists for component: CPKMB Lab Results Component Value Date/Time Cholesterol, total 192 10/01/2020 12:00 AM  
 HDL Cholesterol 29 (L) 10/01/2020 12:00 AM  
 LDL Chol Calc (NIH) 123 (H) 10/01/2020 12:00 AM  
 Triglyceride 222 (H) 10/01/2020 12:00 AM  
 
Lab Results Component Value Date/Time Glucose (POC) 167 (H) 10/29/2020 07:21 AM  
 Glucose (POC) 194 (H) 10/28/2020 09:35 PM  
 Glucose (POC) 154 (H) 10/28/2020 04:25 PM  
 Glucose (POC) 164 (H) 10/28/2020 11:45 AM  
 Glucose (POC) 226 (H) 10/28/2020 08:15 AM  
 
No results found for: COLOR, APPRN, SPGRU, REFSG, GENOVEVA, PROTU, GLUCU, Mary Ip, BILU, UROU, DAVIE, LEUKU, GLUKE, EPSU, BACTU, WBCU, RBCU, CASTS, UCRY Medications Reviewed:  
 
Current Facility-Administered Medications Medication Dose Route Frequency  bumetanide (BUMEX) tablet 2 mg  2 mg Oral BID  albuterol (PROVENTIL VENTOLIN) nebulizer solution 2.5 mg  2.5 mg Nebulization BID PRN  
 sodium chloride (NS) flush 5-40 mL  5-40 mL IntraVENous Q8H  
 sodium chloride (NS) flush 5-40 mL  5-40 mL IntraVENous PRN  polyethylene glycol (MIRALAX) packet 17 g  17 g Oral DAILY PRN  
 glucose chewable tablet 16 g  4 Tab Oral PRN  
 glucagon (GLUCAGEN) injection 1 mg  1 mg IntraMUSCular PRN  Vancomycin dosing per pharmacy   Other Rx Dosing/Monitoring  vancomycin (VANCOCIN) 2000 mg in  ml infusion  2,000 mg IntraVENous Q24H  
 amLODIPine (NORVASC) tablet 2.5 mg  2.5 mg Oral DAILY  sodium chloride (NS) flush 5-40 mL  5-40 mL IntraVENous Q8H  
 sodium chloride (NS) flush 5-40 mL  5-40 mL IntraVENous PRN  
 Warfarin - Pharmacy to Dose   Other Rx Dosing/Monitoring  magnesium oxide (MAG-OX) tablet 400 mg  400 mg Oral DAILY  dextrose 10% infusion 0-250 mL  0-250 mL IntraVENous PRN  
 insulin NPH (NOVOLIN N, HUMULIN N) injection 20 Units  0.15 Units/kg SubCUTAneous BID  insulin lispro (HUMALOG) injection   SubCUTAneous AC&HS  piperacillin-tazobactam (ZOSYN) 3.375 g in 0.9% sodium chloride (MBP/ADV) 100 mL  3.375 g IntraVENous Q8H  
 aspirin delayed-release tablet 81 mg  81 mg Oral DAILY  arformoterol 15 mcg/budesonide 0.5 mg neb solution   Nebulization BID RT  
 carvediloL (COREG) tablet 25 mg  25 mg Oral BID WITH MEALS  ergocalciferol capsule 50,000 Units  50,000 Units Oral Q7D  
 ferrous sulfate tablet 325 mg  1 Tab Oral DAILY WITH BREAKFAST  gabapentin (NEURONTIN) capsule 300 mg  300 mg Oral DAILY  hydrOXYzine HCL (ATARAX) tablet 10 mg  10 mg Oral TID PRN  
 icosapent ethyL (VASCEPA) capsule 1 Cap  1 Cap Oral BID WITH MEALS  
 PARoxetine (PAXIL) tablet 30 mg  30 mg Oral DAILY  sacubitriL-valsartan (ENTRESTO)  mg tablet 1 Tab  1 Tab Oral BID  tolterodine (DETROL) tablet 8 mg  8 mg Oral BID  hydrALAZINE (APRESOLINE) 20 mg/mL injection 5 mg  5 mg IntraVENous Q4H PRN  
 acetaminophen (TYLENOL) tablet 650 mg  650 mg Oral Q4H PRN Facility-Administered Medications Ordered in Other Encounters Medication Dose Route Frequency  [START ON 11/3/2020] ferumoxytoL (FERAHEME) 510 mg in 0.9% sodium chloride 100 mL, overfill volume 10 mL IVPB  510 mg IntraVENous ONCE  
 [START ON 11/3/2020] cloNIDine HCL (CATAPRES) tablet 0.1 mg  0.1 mg Oral Q4H PRN  
 [START ON 11/3/2020] diphenhydrAMINE (BENADRYL) capsule 25 mg  25 mg Oral ONCE  
 [START ON 11/3/2020] acetaminophen (TYLENOL) tablet 650 mg  650 mg Oral ONCE  
 
______________________________________________________________________ EXPECTED LENGTH OF STAY: - - - 
ACTUAL LENGTH OF STAY:          2 Isidra Hay MD

## 2020-10-29 NOTE — PROGRESS NOTES
Pharmacy renal dosing protocol Day #1 of cefepime Indication:   RLE cellulitis, sternal wound infection Current regimen:  2g Iv q8h Abx regimen:  Vanc, cefepime Recent Labs 10/29/20 
0233 10/28/20 
0330 10/27/20 
1831 WBC  --  7.0 8.5 CREA 1.64* 1.38* 1.60* BUN 26* 24* 28* Est CrCl: 47 ml/min; Temp (24hrs), Av.1 °F (36.7 °C), Min:97.6 °F (36.4 °C), Max:99 °F (37.2 °C) 
 
10/27 blood: NGTD - pend Plan: Change to 2 g IV q24h for CrCl 30-60 ml/min

## 2020-10-29 NOTE — PROGRESS NOTES
2025: Bedside shift change report given to Sherron (oncoming nurse) by Maegan Genao (offgoing nurse). Report included the following information SBAR, Intake/Output, MAR, Accordion, Recent Results, Med Rec Status and Cardiac Rhythm Paced. 0740: Bedside shift change report given to Maegan Genao (oncoming nurse) by Phillip Johnson (offgoing nurse). Report included the following information SBAR, Intake/Output, MAR, Accordion, Recent Results, Med Rec Status and Cardiac Rhythm Paced.

## 2020-10-30 NOTE — PROGRESS NOTES
Bedside and Verbal shift change report given to [facility] (oncoming nurse) by Russell Hawk RN (offgoing nurse). Report included the following information SBAR, Kardex, Procedure Summary, Intake/Output, MAR, Recent Results and Cardiac Rhythm PACED. Bedside and Verbal shift change report given to Nini (oncoming nurse) by Ella Hendrickson RN (offgoing nurse). Report included the following information SBAR, Kardex, Procedure Summary, Intake/Output, MAR, Recent Results and Cardiac Rhythm PACED.

## 2020-10-30 NOTE — PROGRESS NOTES
[facility] VAD/Heart Failure Progress Note Admit Date: 10/27/2020 POD:  * No surgery found * Procedure:      
 
 Subjective:  
Chronic sternal wound infection; getting CT scan Objective:  
Vitals: 
Blood pressure (!) 90/58, pulse 60, temperature 97.8 °F (36.6 °C), resp. rate 18, height 5' 7\" (1.702 m), weight 296 lb 1.2 oz (134.3 kg), SpO2 95 %. Temp (24hrs), Av.3 °F (36.8 °C), Min:97.8 °F (36.6 °C), Max:98.8 °F (37.1 °C) Hemodynamics: 
 CO:   
 CI:   
 CVP:   
 SVR:   
 PAP Systolic:   
 PAP Diastolic:   
 PVR:   
 KA05:   
 SCV02:   
 
VAD Interrogation: LVAD (Heartmate) Pump Speed (RPM): 9400 Pump Flow (LPM): 4.4 PI (Pulsitility Index): 3.4 Power: 5.4 MAP: 68  Test: No 
Back Up  at Bedside & Labeled: Yes Power Module Test: No 
Driveline Site Care: No 
Driveline Dressing: Clean, Dry, and Intact EKG/Rhythm:   
 
Extubation Date / Time:  
 
CT Output:  
 
Ventilator: 
  
 
Oxygen Therapy: 
Oxygen Therapy O2 Sat (%): 95 % (10/30/20 1528) Pulse via Oximetry: 75 beats per minute (10/30/20 0940) O2 Device: Room air (10/30/20 1530) CXR: 
 
Admission Weight: Last Weight Weight: 288 lb 12.8 oz (131 kg) Weight: 296 lb 1.2 oz (134.3 kg) Intake / Output / Drain: 
Current Shift: 10/30 0701 - 10/30 1900 In: 1000 [P.O.:400; I.V.:600] Out: 800 [Urine:800] Last 24 hrs.:  
 
Intake/Output Summary (Last 24 hours) at 10/30/2020 1814 Last data filed at 10/30/2020 1530 Gross per 24 hour Intake 1350 ml Output 2200 ml Net -850 ml No results for input(s): CPK, CKMB, TROIQ in the last 72 hours. Recent Labs 10/30/20 
1230 10/30/20 
0355 10/29/20 
4361 10/28/20 
0330 10/27/20 
1831 NA  --  140 143 142 140 K  --  4.1 4.2 4.1 4.2 CO2  --   28 26 28 BUN  --  26* 26* 24* 28* CREA  --  1.59* 1.64* 1.38* 1.60* GLU  --  189* 169* 245* 192* PHOS  --   --   --  3.4  --   
MG  --  1.8 1.9 2.0  --   
WBC 9.1  --   -- 7.0 8.5 HGB 9.8*  --   --  8.8* 9.5* HCT 32.6*  --   --  30.2* 32.5*  
  --   --  189 218 Recent Labs 10/30/20 
0355 10/29/20 
0359 10/28/20 
1119 INR 2.4* 2.6* 2.7* PTP 23.7* 25.9* 27.0* APTT 47.3* 51.3* 52.1* No lab exists for component: PBNP Current Facility-Administered Medications:  
  magnesium oxide (MAG-OX) tablet 400 mg, 400 mg, Oral, BID, Aniya Palin E, NP, 400 mg at 10/30/20 1727 
  [START ON 10/31/2020] Vancomycin trough at 0500 on 10/31, , Other, ONCE, Festus Ribeiro MD 
  bumetanide (BUMEX) tablet 2 mg, 2 mg, Oral, BID, Aniya Palin, NP, 2 mg at 10/30/20 1726   cefepime (MAXIPIME) 2 g in 0.9% sodium chloride (MBP/ADV) 100 mL, 2 g, IntraVENous, Q24H, Puneet Méndez MD, Last Rate: 200 mL/hr at 10/30/20 1513, 2 g at 10/30/20 1513   ammonium lactate (LAC-HYDRIN) 12 % lotion, , Topical, BID, Oly Belle, TEA 
  albuterol (PROVENTIL VENTOLIN) nebulizer solution 2.5 mg, 2.5 mg, Nebulization, BID PRN, Disha Hawley MD 
  sodium chloride (NS) flush 5-40 mL, 5-40 mL, IntraVENous, Q8H, Disha Hawley MD, 10 mL at 10/30/20 1520 
  sodium chloride (NS) flush 5-40 mL, 5-40 mL, IntraVENous, PRN, Disha Hawley MD 
  polyethylene glycol (MIRALAX) packet 17 g, 17 g, Oral, DAILY PRN, Disha Hawley MD 
  glucose chewable tablet 16 g, 4 Tab, Oral, PRN, Disha aHwley MD 
  glucagon (GLUCAGEN) injection 1 mg, 1 mg, IntraMUSCular, PRN, Disha Hawley MD 
  Vancomycin dosing per pharmacy, , Other, Rx Dosing/Monitoring, Disha Hawley MD 
  vancomycin (VANCOCIN) 2000 mg in  ml infusion, 2,000 mg, IntraVENous, Q24H, Disha Hawley MD, Last Rate: 250 mL/hr at 10/30/20 0732, 2,000 mg at 10/30/20 6863   amLODIPine (NORVASC) tablet 2.5 mg, 2.5 mg, Oral, DAILY, Aniya Palin, NP, 2.5 mg at 10/30/20 4236   sodium chloride (NS) flush 5-40 mL, 5-40 mL, IntraVENous, Q8H, Aniya Palin E, NP, 10 mL at 10/30/20 1520  sodium chloride (NS) flush 5-40 mL, 5-40 mL, IntraVENous, PRN, aSira Marie NP 
  Warfarin - Pharmacy to Dose, , Other, Rx Dosing/Monitoring, Jose R Ward MD 
  dextrose 10% infusion 0-250 mL, 0-250 mL, IntraVENous, PRN, Saira Marie NP 
  insulin NPH (NOVOLIN N, HUMULIN N) injection 20 Units, 0.15 Units/kg, SubCUTAneous, BID, Saira Marie NP, 20 Units at 10/30/20 1726 
  insulin lispro (HUMALOG) injection, , SubCUTAneous, AC&HS, Saira Marie NP, 2 Units at 10/30/20 1726   aspirin delayed-release tablet 81 mg, 81 mg, Oral, DAILY, Disha Hawley MD, 81 mg at 10/30/20 0917 
  arformoterol 15 mcg/budesonide 0.5 mg neb solution, , Nebulization, BID RT, Disha Hawley MD 
  carvediloL (COREG) tablet 25 mg, 25 mg, Oral, BID WITH MEALS, Disha Hawley MD, 25 mg at 10/30/20 1726   ergocalciferol capsule 50,000 Units, 50,000 Units, Oral, Q7D, Disha Hawley MD, 50,000 Units at 10/29/20 3535   ferrous sulfate tablet 325 mg, 1 Tab, Oral, DAILY WITH BREAKFAST, Disha Hawley MD, 325 mg at 10/30/20 0917 
  gabapentin (NEURONTIN) capsule 300 mg, 300 mg, Oral, DAILY, Disha Hawley MD, 300 mg at 10/30/20 8929   hydrOXYzine HCL (ATARAX) tablet 10 mg, 10 mg, Oral, TID PRN, Disha Hawley MD 
  icosapent ethyL (VASCEPA) capsule 1 Cap, 1 Cap, Oral, BID WITH MEALS, Disha Hawley MD 
  PARoxetine (PAXIL) tablet 30 mg, 30 mg, Oral, DAILY, Disha Hawley MD, 30 mg at 10/30/20 0916 
  sacubitriL-valsartan (ENTRESTO)  mg tablet 1 Tab, 1 Tab, Oral, BID, Disha Hawley MD, 1 Tab at 10/30/20 1726   tolterodine (DETROL) tablet 8 mg, 8 mg, Oral, BID, Disha Hawley MD 
  hydrALAZINE (APRESOLINE) 20 mg/mL injection 5 mg, 5 mg, IntraVENous, Q4H PRN, Disha Hawley MD 
  acetaminophen (TYLENOL) tablet 650 mg, 650 mg, Oral, Q4H PRN, Disha Hawley MD 
 
Facility-Administered Medications Ordered in Other Encounters:  
  [START ON 11/3/2020] ferumoxytoL (FERAHEME) 510 mg in 0.9% sodium chloride 100 mL, overfill volume 10 mL IVPB, 510 mg, IntraVENous, ONCE, MD Marques Caceres  [START ON 11/3/2020] cloNIDine HCL (CATAPRES) tablet 0.1 mg, 0.1 mg, Oral, Q4H PRN, MD Marques Caceres  [START ON 11/3/2020] diphenhydrAMINE (BENADRYL) capsule 25 mg, 25 mg, Oral, ONCE, MD Marques Caceres  [START ON 11/3/2020] acetaminophen (TYLENOL) tablet 650 mg, 650 mg, Oral, ONCE, Shira Lieberman MD 
 
A/P 
 
S/P LVAD - good flows Need for anticoagulation - coumadin Depression - home meds Chronic wound infection - Abx's Risk of morbidity and mortality - high Medical decision making - high complexity Signed By: Aj Shaffer MD

## 2020-10-30 NOTE — PROGRESS NOTES
[address] [facility] 904 [facility] in [address], [place] Heart Failure Inpatient Progress Note Patient name: Gena King Patient : 1950 Patient MRN: [id number] Date of service: 10/30/20 CHIEF COMPLAINT: 
Cellulitis 
  
PLAN: 
Continue current device speed 9400rpm; increase speed once MAPs under control Continue current medical therapy for heart failure Continue current dose of coreg 25mg twice daily Continue entresto to 49/51mg twice daily Increase norvasc to 2.5mg twice daily No spironolactone due to hyperkalemia Magnesium oxide 400mg daily, check Mg with labs Patient states she has reaction to allopurinol (leg edema), will hold and try colchicine or uloric Continue synthroid, TSH & Free T4 at goal 
vitamin D,  high dose weekly x 12 weeks Chronic anticoagulation with coumadin, dosage per pharmacy (goal 2-2.5) Check iron profile/ferritin after second venofer infusion, obtain hemoccult Continue baby ASA Patient not taking statin, retrieve medical records for reason ICD interrogation per routine Continue CPAP for JED Antibiotics per ID consult/primary team, wound cx pending Sternal wound infection per CTS 
DTC consult recs appreciated, HgbA1C -8.4% NPH and SSI per DTC recs Ambulate daily/PT/OT Plan on [address] early next week prior to discharge Note: Needs multiple referrals prior to hospital discharge: nutritionist, GYN and urogynecologist, pulmonologist, EP cardiologist; also patient needs flu shot and review records if has pneumonia vaccine at [place]: 
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 Kathrin Wells at [facility]) · C/b delayed extubation due to severe COPD 
· C/b critical illness polyneuropathy · C/b prolonged hospitalization post-LVAD, 55 days · C/b sternal wound infection s/p debridement (by Dr. Kathrin Wells) s/p wound vac · C/b sacral ulcer · Would culture positive for Staph aureus, not MRSA · C/b LVAD site drainage, improved S/p CRT-ICD · ICD fired due to electrolyte imbalance () H/o breast cancer () · s/p bilateral mastectomy/chemo and endometrial cancer s/p hysterectomy · Lymphedema of LLE due to cancer treatment Severe COPD with FEV1 50% Depression Atrial fibrillation H/o \"two mini strokes\" S/p fall with hip facture · Right hip hemmiarthroplasty (18) by Dr. Manuel Lombardo) · S/p removed hip hardwarare due to pain (4/15/19) COPD severe CKD, stage 3 Hyperkalemia Pulmonary hypertension Cardiac risk factors: · Morbid obesity, Body mass index is 46.37 kg/m². · DM2 insulin dependent · JED on CPAP 
· HL Urinary incontinence, severe · no procedures due to anticoagulation · conservative management with Detrol 4 pills bid Endometrial cancer () HTN 
HL 
  
CARDIAC IMAGING: 
Echo (19) LVEF 20-25%, AV opens 1:1, no AR Echo (18) LVEF 10-%, ramps study done, report in [facility] Echo (18) ramp study done, LVEDD 7.1cm LHC (18) 2 vessel disease with 90% OM, 80% PDA, DSA to PDA branch 
TTE (10/27/20) LVEF 15-20% LVIDd 7.26cm RVIDd 2.82cm Tapse 1.14cm 
  
HEMODYNAMICS: 
RHC not done CPEST not done 6MW not done 
  
OTHER IMAGING: 
EGD/C-scope (19) no active bleeding and polyp removed. 
  
FAMILY HISTORY: 
Mother  76 years, diagnosed with DM, HTN, CAD Father  [de-identified]years old, HTN, CAD, MI 
Positive for DM and heart disease in the family, brother with valve replacement 
  
SOCIAL HISTORY: 
Never alcohol, never smoked, , lives alone, no illicit drug use, lives in house, ambulatory status indpendedn, children: daughter, occupation retired Lives alone. 
  
ALLERGIES: benzodiazepines and quetiapine fumerate (lethargy, resp failure to both) 
  
HISTORY OF PRESENT ILLNESS: 
Briefly, Reece Ramirez is a 78 y.o. female with multiple comorbidities listed above presents to our clinic requesting transfer of care from [facility] LVAD program. 
  
REVIEW OF SYSTEMS: 
General: Denies fever Ear, nose and throat: Denies difficulty hearing, sinus problems, runny nose, post-nasal drip, ringing in ears, mouth sores, loose teeth, ear pain, nosebleeds, sore throate, facial pain or numbess Cardiovascular: see above in the interval history Respiratory: denies cough, wheezing, sputum production, or hemoptysis. Gastrointestinal: Denies heartburn, constipation, intolerance to certain foods, diarrhea, abdominal pain, nausea, vomiting, difficulty swallowing, blood in stool Kidney and bladder: Denies painful urination, frequent urination, or urgency Musculoskeletal: Denies joint pain, muscle weakness Skin and hair: Denies change in hair loss or increase, breast changes Redness and edema, skin breakdown RLE. PHYSICAL EXAM: 
Visit Vitals BP (!) 90/58 (BP 1 Location: Right arm, BP Patient Position: At rest) Pulse 60 Temp 97.8 °F (36.6 °C) Resp 18 Ht 5' 7\" (1.702 m) Wt 296 lb 1.2 oz (134.3 kg) SpO2 95% BMI 46.37 kg/m² LVAD Pump Speed (RPM): 9400 Pump Flow (LPM): 4.4 MAP: 72 
PI (Pulsitility Index): 3.4 Power: 5.4  Test: No 
Back Up  at Bedside & Labeled: Yes Power Module Test: No 
Driveline Site Care: No 
Driveline Dressing: Clean, Dry, and Intact Outpatient: No 
MAP in Therapeutic Range (Outpatient): Yes Testing Alarms Reviewed: Yes 
Back up SC speed: 9400 Back up Low Speed Limit: 9000 Emergency Equipment with Patient?: Yes Emergency procedures reviewed?: Yes Drive line site inspected?: No 
Drive line intergrity inspected?: Yes Drive line dressing changed?: No 
 
 
General: Patient is morbidly obese in no acute distress, sitting up in chair HEENT: Normocephalic and atraumatic. No scleral icterus. Pupils are equal, round and reactive to light and accomodation. No conjunctival injection. Oropharynx is clear. Neck: Supple. No evidence of thyroid enlargements or lymphadenopathy. JVD: Cannot be appreciated Lungs: Breath sounds are equal and clear bilaterally. No wheezes, rhonchi, or rales. Heart: VAD hum Abdomen: Soft, obese, no mass or tenderness. No organomegaly or hernia. Bowel sounds present. Genitourinary and rectal: deferred Extremities: No cyanosis, clubbing, Redness and edema, skin breakdown RLE; BLE edema 3+, venous stasis Neurologic: No focal sensory or motor deficits are noted. Grossly intact. Psychiatric: Awake, alert and oriented x 3. Appropriate mood and affect. Skin: Warm, dry, no nodules + petechiae on extremities PAST MEDICAL HISTORY: 
Past Medical History:  
Diagnosis Date  Asthma  Cancer ([facility] breast  
 Cancer ([facility]   
 endometrial  
 Congestive heart failure, unspecified  CRI (chronic renal insufficiency)  Depression  Diabetes ([facility]  Hypertension PAST SURGICAL HISTORY: 
Past Surgical History:  
Procedure Laterality Date  HX HERNIA REPAIR    
 HX HYSTERECTOMY  HX MASTECTOMY FAMILY HISTORY: 
History reviewed. No pertinent family history. SOCIAL HISTORY: 
Social History Socioeconomic History  Marital status:  Spouse name: Not on file  Number of children: Not on file  Years of education: Not on file  Highest education level: Not on file Tobacco Use  Smoking status: Never Smoker  Smokeless tobacco: Never Used Substance and Sexual Activity  Alcohol use: Not Currently LABORATORY RESULTS: 
Labs Latest Ref Rng & Units 10/30/2020 10/29/2020 10/28/2020 10/27/2020 10/1/2020 WBC 3.6 - 11.0 K/uL 9.1 - 7.0 8.5 7.2 RBC 3.80 - 5.20 M/uL 3.90 - 3.57(L) 3.81 3.75(L) Hemoglobin 11.5 - 16.0 g/dL 9.8(L) - 8. 8(L) 9.5(L) 9.3(L) Hematocrit 35.0 - 47.0 % 32. 6(L) - 30. 2(L) 32. 5(L) 29. 7(L) MCV 80.0 - 99.0 FL 83.6 - 84.6 85.3 79 Platelets 401 - 290 K/uL 248 - 189 218 225 Lymphocytes 12 - 49 % - - 15 17 - Monocytes 5 - 13 % - - 5 5 - Eosinophils 0 - 7 % - - 3 3 - Basophils 0 - 1 % - - 0 1 - Albumin 3.5 - 5.0 g/dL 2. 9(L) 2. 7(L) 2. 9(L) 3. 3(L) 3.8 Calcium 8.5 - 10.1 MG/DL 8.9 8. 0(L) 8.5 8.7 9.0 Glucose 65 - 100 mg/dL 189(H) 169(H) 245(H) 192(H) 182(H) BUN 6 - 20 MG/DL 26(H) 26(H) 24(H) 28(H) 27 Creatinine 0.55 - 1.02 MG/DL 1.59(H) 1.64(H) 1.38(H) 1.60(H) 1.32(H) Sodium 136 - 145 mmol/L 140 143 142 140 140 Potassium 3.5 - 5.1 mmol/L 4.1 4.2 4.1 4.2 5.4(H) TSH 0.450 - 4.500 uIU/mL - - - - 2.980 LDH 81 - 246 U/L 330(H) 309(H) - 324(H) 227(H) Some recent data might be hidden ALLERGY: 
No Known Allergies CURRENT MEDICATIONS: 
 
Current Facility-Administered Medications:  
  magnesium oxide (MAG-OX) tablet 400 mg, 400 mg, Oral, BID, Shonda OWENS NP, 400 mg at 10/30/20 0916 
  [START ON 10/31/2020] Vancomycin trough at 0500 on 10/31, , Other, ONCE, Festus Ribeiro MD 
  warfarin (COUMADIN) tablet 4.5 mg, 4.5 mg, Oral, ONCE, Festus Ribeiro MD 
  bumetanide (BUMEX) tablet 2 mg, 2 mg, Oral, BID, Shonda France NP, 2 mg at 10/30/20 4977   cefepime (MAXIPIME) 2 g in 0.9% sodium chloride (MBP/ADV) 100 mL, 2 g, IntraVENous, Q24H, Puneet Méndez MD, Last Rate: 200 mL/hr at 10/30/20 1513, 2 g at 10/30/20 1513   ammonium lactate (LAC-HYDRIN) 12 % lotion, , Topical, BID, Oly Belle NP 
  albuterol (PROVENTIL VENTOLIN) nebulizer solution 2.5 mg, 2.5 mg, Nebulization, BID PRN, Disha Hawley MD 
  sodium chloride (NS) flush 5-40 mL, 5-40 mL, IntraVENous, Q8H, Disha Hawley MD, 10 mL at 10/30/20 1520 
  sodium chloride (NS) flush 5-40 mL, 5-40 mL, IntraVENous, PRN, Disha Hawley MD 
  polyethylene glycol (MIRALAX) packet 17 g, 17 g, Oral, DAILY PRN, Disha Hawley MD 
  glucose chewable tablet 16 g, 4 Tab, Oral, PRN, Disha Hawley MD 
  glucagon (GLUCAGEN) injection 1 mg, 1 mg, IntraMUSCular, PRN, Disha Hawley MD 
  Vancomycin dosing per pharmacy, , Other, Rx Dosing/Monitoring, Disha Hawley MD 
  vancomycin (VANCOCIN) 2000 mg in  ml infusion, 2,000 mg, IntraVENous, Q24H, Disha Hawley MD, Last Rate: 250 mL/hr at 10/30/20 0732, 2,000 mg at 10/30/20 1297   amLODIPine (NORVASC) tablet 2.5 mg, 2.5 mg, Oral, DAILY, Will Dress, NP, 2.5 mg at 10/30/20 2973   sodium chloride (NS) flush 5-40 mL, 5-40 mL, IntraVENous, Q8H, Will Dress E, NP, 10 mL at 10/30/20 1520 
  sodium chloride (NS) flush 5-40 mL, 5-40 mL, IntraVENous, PRN, Will Dress, NP 
  Warfarin - Pharmacy to Dose, , Other, Rx Dosing/Monitoring, Sheri Ward MD 
  dextrose 10% infusion 0-250 mL, 0-250 mL, IntraVENous, PRN, Will Dress, NP 
  insulin NPH (NOVOLIN N, HUMULIN N) injection 20 Units, 0.15 Units/kg, SubCUTAneous, BID, Will Dress, NP, 20 Units at 10/30/20 0596 
  insulin lispro (HUMALOG) injection, , SubCUTAneous, AC&HS, Will Dress, NP, 2 Units at 10/30/20 1226   aspirin delayed-release tablet 81 mg, 81 mg, Oral, DAILY, Disha Hawley MD, 81 mg at 10/30/20 0917 
  arformoterol 15 mcg/budesonide 0.5 mg neb solution, , Nebulization, BID RT, Disha Hawley MD 
  carvediloL (COREG) tablet 25 mg, 25 mg, Oral, BID WITH MEALS, Disha Hawley MD, 25 mg at 10/30/20 2242   ergocalciferol capsule 50,000 Units, 50,000 Units, Oral, Q7D, Disha Hawley MD, 50,000 Units at 10/29/20 6031   ferrous sulfate tablet 325 mg, 1 Tab, Oral, DAILY WITH BREAKFAST, Disha Hawley MD, 325 mg at 10/30/20 0917 
  gabapentin (NEURONTIN) capsule 300 mg, 300 mg, Oral, DAILY, Disha Hawley MD, 300 mg at 10/30/20 2732   hydrOXYzine HCL (ATARAX) tablet 10 mg, 10 mg, Oral, TID PRN, Disha Hawley MD 
  icosapent ethyL (VASCEPA) capsule 1 Cap, 1 Cap, Oral, BID WITH MEALS, Disha Hawley MD 
  PARoxetine (PAXIL) tablet 30 mg, 30 mg, Oral, DAILY, Disha Hawley MD, 30 mg at 10/30/20 0916 
  sacubitriL-valsartan (ENTRESTO)  mg tablet 1 Tab, 1 Tab, Oral, BID, Mainor Koo MD, 1 Tab at 10/30/20 3750   tolterodine (DETROL) tablet 8 mg, 8 mg, Oral, BID, Disha Hawley MD 
  hydrALAZINE (APRESOLINE) 20 mg/mL injection 5 mg, 5 mg, IntraVENous, Q4H PRN, Disha Hawley MD 
  acetaminophen (TYLENOL) tablet 650 mg, 650 mg, Oral, Q4H PRN, Disha Hawley MD 
 
Facility-Administered Medications Ordered in Other Encounters:  
  [START ON 11/3/2020] ferumoxytoL (FERAHEME) 510 mg in 0.9% sodium chloride 100 mL, overfill volume 10 mL IVPB, 510 mg, IntraVENous, ONCE, Cara Rubio MD 
[facility]  [START ON 11/3/2020] cloNIDine HCL (CATAPRES) tablet 0.1 mg, 0.1 mg, Oral, Q4H PRN, Cara Rubio MD 
[facility]  [START ON 11/3/2020] diphenhydrAMINE (BENADRYL) capsule 25 mg, 25 mg, Oral, ONCE, Cara Rubio MD 
[facility]  [START ON 11/3/2020] acetaminophen (TYLENOL) tablet 650 mg, 650 mg, Oral, ONCE, Cara Rubio MD 
 
Thank you for your referral and allowing me to participate in this patient's care. TEA Blanco [address], [address] Phone: [phone] Fax: [phone] PATIENT CARE TEAM: 
Patient Care Team: 
Oliva Caldera NP as PCP - General (Nurse Practitioner) AHF ATTENDING ADDENDUM Patient was seen and examined in person. Data and notes were reviewed. I have discussed and agree with the plan as noted in the NP note above without further additions. Terence Denson MD PhD 
[facility] [address] [facility]

## 2020-10-30 NOTE — CONSULTS
Nutrition Education · Verbally reviewed information with Patient. Pt educated on Heart Failure MNT, especially in regard to sodium and fluid restriction. Also reinforced nutrition aspect of diabetes management related to diabetes education received earlier in the day. Pt had questions about her insulin, RD was able to answer. Pt understood reason for diet order and need for diabetes management. Pt was involved and attentive for education. Melvina Henry heart failure [employer] provided. Electronically signed by Pati Braxton MS, NDTR, Dietetic Intern, on 10/30/2020 at 4:23 PM 
 
Contact: Lolita Malik RD, via [facility]

## 2020-10-30 NOTE — PROGRESS NOTES
[address] [facility] Adult  Hospitalist Group Hospitalist Progress Note Lilia Lance MD 
Answering service: [phone] OR [phone] from in house phone Date of Service:  10/30/2020 NAME:  Tonnie Brunner :  1950 MRN:  [id number] Admission Summary: This is a 78year old female with a significant past medical history of chronic systolic heart failure due to ischemic cardiomyopathy with implanted left ventricular assist device. Other significant PMH:  type 2 diabetes, hypertension, obesity, COPD, depression invasion Interval history / Subjective:  
Patient is seen and examined. Feels well, says she is independently mobile, though RNs raised concern about her safety getting up, will consult PT for eval. Also RN noted dark blotch on her genital area, will examine with chaperone when RN ready. Assessment & Plan:  
Acute-on-chronic systolic congestive heart failure,, NYHA class IV. Ischemic cardiomyopathy- s/p LVAD 
-Being diuresed with IV Bumex. Continue other cardiac regimen: Carvedilol, Entresto. 
-Daily weight. Strict I&O- Further management by advanced CHF team. 
 
Suspect sternal wound infection: Antibiotics as above. Cardiac surgery consulted Cellulitis of RLE: background of venous stasis and small wound - ID following, cefepime and vancomycin- following cultures NGTD 
 
HTN: chronic, stable, Home regimen, PRN BP meds. Monitor Chronic normocytic anemia. Hemoglobin stable between 89, iron/B12/folate wnl Type 2 diabetes with hyperglycemia: SSI- NPH 20 units twice daily. monitor COPD: stable-Continue maintenance inhaled steroids. Bronchodilators as needed. CKD 3, appears baseline Depression, stable. Continue  Paxil Morbid obesityBody mass index is 46.89 kg/m². Counseled on weight loss Code status: Full code DVT prophylaxis: On warfarin Care Plan discussed with: Patient/Family and Nurse Anticipated Disposition: Home w/Family: Greater than 48 hours Hospital Problems  Date Reviewed: 10/28/2020 Codes Class Noted POA * (Principal) Acute on chronic systolic CHF (congestive heart failure) (HCC) ICD-10-CM: U84.01 ICD-9-CM: 428.23, 428.0  10/27/2020 Yes Review of Systems: A comprehensive review of systems was negative except for that written in the HPI. Vital Signs:  
 Last 24hrs VS reviewed since prior progress note. Most recent are: 
Visit Vitals BP (!) 90/58 (BP 1 Location: Right arm, BP Patient Position: At rest) Pulse 62 Temp 98.4 °F (36.9 °C) Resp 18 Ht 5' 7\" (1.702 m) Wt 135.8 kg (299 lb 6.2 oz) SpO2 96% BMI 46.89 kg/m² Intake/Output Summary (Last 24 hours) at 10/30/2020 0850 Last data filed at 10/30/2020 5057 Gross per 24 hour Intake 790 ml Output 2300 ml Net -1510 ml Physical Examination:  
 
 
     
Constitutional:  Patient's lying flat in bed without any distress, on room air. HEENT:  Atraumatic. Oral mucosa moist,. Non icteric sclera. No pallor. Resp:  On room air. CTA bilaterally. No wheezing/rhonchi/rales. No accessory muscle use Chest Wall: No deformity CV:  Regular rhythm, normal rate, no murmurs, gallops, rubs GI:  Obese. LVAD exit to the epigastric area. Soft, non distended, non tender. normoactive bowel sounds, no hepatosplenomegaly :  No CVA or suprapubic tenderness Musculoskeletal:  Purplish erythema on the right lower extremity. Small superficial wound without discharge. Neurologic:  Mental status:AAOx3,  
Cranial nerves II-XII : WNL Motor exam:Moves all extremities symmetrically Data Review:  
 Review and/or order of clinical lab test 
Review and/or order of tests in the radiology section of CPT Review and/or order of tests in the medicine section of CPT Labs:  
 
Recent Labs 10/28/20 
0330 10/27/20 
1831 WBC 7.0 8.5 HGB 8.8* 9.5* HCT 30.2* 32.5*  218 Recent Labs 10/30/20 
0355 10/29/20 
0103 10/28/20 
0330  143 142  
K 4.1 4.2 4.1  110* 110* CO2 28 28 26 BUN 26* 26* 24* CREA 1.59* 1.64* 1.38* * 169* 245* CA 8.9 8.0* 8.5 MG 1.8 1.9 2.0 PHOS  --   --  3.4 Recent Labs 10/30/20 
0355 10/29/20 
1026 10/28/20 
0330 ALT 7* 7* 7* AP 76 79 93 TBILI 0.3 0.3 0.3 TP 7.0 6.7 6.9 ALB 2.9* 2.7* 2.9*  
GLOB 4.1* 4.0 4.0 Recent Labs 10/30/20 
0355 10/29/20 
2303 10/28/20 
1119 INR 2.4* 2.6* 2.7* PTP 23.7* 25.9* 27.0* APTT 47.3* 51.3* 52.1* Recent Labs 10/28/20 
0356 10/28/20 
0330 10/27/20 
1831 TIBC 348  --  560* PSAT 98*  --  63* FERR 72 PLEASE DISREGARD RESULTS  --   
  
Lab Results Component Value Date/Time Folate 10.0 10/28/2020 03:56 AM  
  
No results for input(s): PH, PCO2, PO2 in the last 72 hours. No results for input(s): CPK, CKNDX, TROIQ in the last 72 hours. No lab exists for component: CPKMB Lab Results Component Value Date/Time Cholesterol, total 192 10/01/2020 12:00 AM  
 HDL Cholesterol 29 (L) 10/01/2020 12:00 AM  
 LDL Chol Calc (NIH) 123 (H) 10/01/2020 12:00 AM  
 Triglyceride 222 (H) 10/01/2020 12:00 AM  
 
Lab Results Component Value Date/Time Glucose (POC) 174 (H) 10/30/2020 07:31 AM  
 Glucose (POC) 167 (H) 10/29/2020 10:36 PM  
 Glucose (POC) 169 (H) 10/29/2020 04:57 PM  
 Glucose (POC) 177 (H) 10/29/2020 11:21 AM  
 Glucose (POC) 167 (H) 10/29/2020 07:21 AM  
 
No results found for: COLOR, APPRN, SPGRU, REFSG, GENOVEVA, PROTU, GLUCU, KETU, BILU, UROU, DAVIE, LEUKU, GLUKE, EPSU, BACTU, WBCU, RBCU, CASTS, UCRY Medications Reviewed:  
 
Current Facility-Administered Medications Medication Dose Route Frequency  magnesium oxide (MAG-OX) tablet 400 mg  400 mg Oral BID  magnesium sulfate 1 g/100 ml IVPB (premix or compounded)  1 g IntraVENous ONCE  
 bumetanide (BUMEX) tablet 2 mg  2 mg Oral BID  cefepime (MAXIPIME) 2 g in 0.9% sodium chloride (MBP/ADV) 100 mL  2 g IntraVENous Q24H  
 ammonium lactate (LAC-HYDRIN) 12 % lotion   Topical BID  albuterol (PROVENTIL VENTOLIN) nebulizer solution 2.5 mg  2.5 mg Nebulization BID PRN  
 sodium chloride (NS) flush 5-40 mL  5-40 mL IntraVENous Q8H  
 sodium chloride (NS) flush 5-40 mL  5-40 mL IntraVENous PRN  polyethylene glycol (MIRALAX) packet 17 g  17 g Oral DAILY PRN  
 glucose chewable tablet 16 g  4 Tab Oral PRN  
 glucagon (GLUCAGEN) injection 1 mg  1 mg IntraMUSCular PRN  Vancomycin dosing per pharmacy   Other Rx Dosing/Monitoring  vancomycin (VANCOCIN) 2000 mg in  ml infusion  2,000 mg IntraVENous Q24H  
 amLODIPine (NORVASC) tablet 2.5 mg  2.5 mg Oral DAILY  sodium chloride (NS) flush 5-40 mL  5-40 mL IntraVENous Q8H  
 sodium chloride (NS) flush 5-40 mL  5-40 mL IntraVENous PRN  
 Warfarin - Pharmacy to Dose   Other Rx Dosing/Monitoring  dextrose 10% infusion 0-250 mL  0-250 mL IntraVENous PRN  
 insulin NPH (NOVOLIN N, HUMULIN N) injection 20 Units  0.15 Units/kg SubCUTAneous BID  insulin lispro (HUMALOG) injection   SubCUTAneous AC&HS  aspirin delayed-release tablet 81 mg  81 mg Oral DAILY  arformoterol 15 mcg/budesonide 0.5 mg neb solution   Nebulization BID RT  
 carvediloL (COREG) tablet 25 mg  25 mg Oral BID WITH MEALS  ergocalciferol capsule 50,000 Units  50,000 Units Oral Q7D  
 ferrous sulfate tablet 325 mg  1 Tab Oral DAILY WITH BREAKFAST  gabapentin (NEURONTIN) capsule 300 mg  300 mg Oral DAILY  hydrOXYzine HCL (ATARAX) tablet 10 mg  10 mg Oral TID PRN  
 icosapent ethyL (VASCEPA) capsule 1 Cap  1 Cap Oral BID WITH MEALS  
 PARoxetine (PAXIL) tablet 30 mg  30 mg Oral DAILY  sacubitriL-valsartan (ENTRESTO)  mg tablet 1 Tab  1 Tab Oral BID  tolterodine (DETROL) tablet 8 mg  8 mg Oral BID  hydrALAZINE (APRESOLINE) 20 mg/mL injection 5 mg  5 mg IntraVENous Q4H PRN  
 acetaminophen (TYLENOL) tablet 650 mg  650 mg Oral Q4H PRN Facility-Administered Medications Ordered in Other Encounters Medication Dose Route Frequency  [START ON 11/3/2020] ferumoxytoL (FERAHEME) 510 mg in 0.9% sodium chloride 100 mL, overfill volume 10 mL IVPB  510 mg IntraVENous ONCE  
 [START ON 11/3/2020] cloNIDine HCL (CATAPRES) tablet 0.1 mg  0.1 mg Oral Q4H PRN  
 [START ON 11/3/2020] diphenhydrAMINE (BENADRYL) capsule 25 mg  25 mg Oral ONCE  
 [START ON 11/3/2020] acetaminophen (TYLENOL) tablet 650 mg  650 mg Oral ONCE  
 
______________________________________________________________________ EXPECTED LENGTH OF STAY: - - - 
ACTUAL LENGTH OF STAY:          3 Sterling Levi MD

## 2020-10-30 NOTE — PROGRESS NOTES
[facility] VAD/Heart Failure Progress Note Admit Date: 10/27/2020 POD:  * No surgery found * Procedure:      
 
 Subjective:  
Mild fatigue; will review CT scan Objective:  
Vitals: 
Blood pressure (!) 90/58, pulse 60, temperature 97.8 °F (36.6 °C), resp. rate 18, height 5' 7\" (1.702 m), weight 296 lb 1.2 oz (134.3 kg), SpO2 95 %. Temp (24hrs), Av.3 °F (36.8 °C), Min:97.8 °F (36.6 °C), Max:98.8 °F (37.1 °C) Hemodynamics: 
 CO:   
 CI:   
 CVP:   
 SVR:   
 PAP Systolic:   
 PAP Diastolic:   
 PVR:   
 WG73:   
 SCV02:   
 
VAD Interrogation: LVAD (Heartmate) Pump Speed (RPM): 9400 Pump Flow (LPM): 4.4 PI (Pulsitility Index): 3.4 Power: 5.4 MAP: 68  Test: No 
Back Up  at Bedside & Labeled: Yes Power Module Test: No 
Driveline Site Care: No 
Driveline Dressing: Clean, Dry, and Intact EKG/Rhythm:   
 
Extubation Date / Time:  
 
CT Output:  
 
Ventilator: 
  
 
Oxygen Therapy: 
Oxygen Therapy O2 Sat (%): 95 % (10/30/20 1528) Pulse via Oximetry: 75 beats per minute (10/30/20 0940) O2 Device: Room air (10/30/20 1530) CXR: 
 
Admission Weight: Last Weight Weight: 288 lb 12.8 oz (131 kg) Weight: 296 lb 1.2 oz (134.3 kg) Intake / Output / Drain: 
Current Shift: No intake/output data recorded. Last 24 hrs.:  
 
Intake/Output Summary (Last 24 hours) at 10/30/2020 1916 Last data filed at 10/30/2020 1530 Gross per 24 hour Intake 1350 ml Output 2200 ml Net -850 ml No results for input(s): CPK, CKMB, TROIQ in the last 72 hours. Recent Labs 10/30/20 
1230 10/30/20 
0355 10/29/20 
0871 10/28/20 
0330 NA  --  140 143 142 K  --  4.1 4.2 4.1  
CO2  --  28 28 26 BUN  --  26* 26* 24* CREA  --  1.59* 1.64* 1.38* GLU  --  189* 169* 245* PHOS  --   --   --  3.4 MG  --  1.8 1.9 2.0 WBC 9.1  --   --  7.0 HGB 9.8*  --   --  8.8* HCT 32.6*  --   --  30.2*   --   --  189 Recent Labs   10/30/20 7941 10/29/20 
8557 10/28/20 
1119 INR 2.4* 2.6* 2.7* PTP 23.7* 25.9* 27.0* APTT 47.3* 51.3* 52.1* No lab exists for component: PBNP Current Facility-Administered Medications:  
  magnesium oxide (MAG-OX) tablet 400 mg, 400 mg, Oral, BID, Adolfo Philippe E, NP, 400 mg at 10/30/20 1727 
  [START ON 10/31/2020] Vancomycin trough at 0500 on 10/31, , Other, ONCE, Festus Ribeiro MD 
  bumetanide (BUMEX) tablet 2 mg, 2 mg, Oral, BID, Adolfo Philippe, NP, 2 mg at 10/30/20 1726   cefepime (MAXIPIME) 2 g in 0.9% sodium chloride (MBP/ADV) 100 mL, 2 g, IntraVENous, Q24H, Puneet Méndez MD, Last Rate: 200 mL/hr at 10/30/20 1513, 2 g at 10/30/20 1513   ammonium lactate (LAC-HYDRIN) 12 % lotion, , Topical, BID, Oly Belle, NP 
  albuterol (PROVENTIL VENTOLIN) nebulizer solution 2.5 mg, 2.5 mg, Nebulization, BID PRN, Disha Hawley MD 
  sodium chloride (NS) flush 5-40 mL, 5-40 mL, IntraVENous, Q8H, Disha Hawley MD, 10 mL at 10/30/20 1520 
  sodium chloride (NS) flush 5-40 mL, 5-40 mL, IntraVENous, PRN, Disha Hawley MD 
  polyethylene glycol (MIRALAX) packet 17 g, 17 g, Oral, DAILY PRN, Disha Hawley MD 
  glucose chewable tablet 16 g, 4 Tab, Oral, PRN, Disha Hawley MD 
  glucagon (GLUCAGEN) injection 1 mg, 1 mg, IntraMUSCular, PRN, Disha Hawley MD 
  Vancomycin dosing per pharmacy, , Other, Rx Dosing/Monitoring, Disha Hawley MD 
  vancomycin (VANCOCIN) 2000 mg in  ml infusion, 2,000 mg, IntraVENous, Q24H, Disha Hawley MD, Last Rate: 250 mL/hr at 10/30/20 0732, 2,000 mg at 10/30/20 0930   amLODIPine (NORVASC) tablet 2.5 mg, 2.5 mg, Oral, DAILY, Adolfo Philippe NP, 2.5 mg at 10/30/20 7276   sodium chloride (NS) flush 5-40 mL, 5-40 mL, IntraVENous, Q8H, Adolfo OWENS, TEA, 10 mL at 10/30/20 1520 
  sodium chloride (NS) flush 5-40 mL, 5-40 mL, IntraVENous, PRN, Adolfo Philippe NP 
  Warfarin - Pharmacy to Dose, , Other, Rx Dosing/Monitoring, Rosemarie Ward MD 
  dextrose 10% infusion 0-250 mL, 0-250 mL, IntraVENous, PRN, Jori Esteban NP 
  insulin NPH (NOVOLIN N, HUMULIN N) injection 20 Units, 0.15 Units/kg, SubCUTAneous, BID, Jori Esteban NP, 20 Units at 10/30/20 1726 
  insulin lispro (HUMALOG) injection, , SubCUTAneous, AC&HS, Jori Esteban, NP, 2 Units at 10/30/20 1726   aspirin delayed-release tablet 81 mg, 81 mg, Oral, DAILY, Disha Hawley MD, 81 mg at 10/30/20 0917 
  arformoterol 15 mcg/budesonide 0.5 mg neb solution, , Nebulization, BID RT, Disha Hawley MD 
  carvediloL (COREG) tablet 25 mg, 25 mg, Oral, BID WITH MEALS, Disha Hawley MD, 25 mg at 10/30/20 1726   ergocalciferol capsule 50,000 Units, 50,000 Units, Oral, Q7D, Disha Hawley MD, 50,000 Units at 10/29/20 9668   ferrous sulfate tablet 325 mg, 1 Tab, Oral, DAILY WITH BREAKFAST, Disha Hawley MD, 325 mg at 10/30/20 0917 
  gabapentin (NEURONTIN) capsule 300 mg, 300 mg, Oral, DAILY, Disha Hawley MD, 300 mg at 10/30/20 5308   hydrOXYzine HCL (ATARAX) tablet 10 mg, 10 mg, Oral, TID PRN, Disha Hawley MD 
  icosapent ethyL (VASCEPA) capsule 1 Cap, 1 Cap, Oral, BID WITH MEALS, Disha Hawley MD 
  PARoxetine (PAXIL) tablet 30 mg, 30 mg, Oral, DAILY, Disha Hawley MD, 30 mg at 10/30/20 0916 
  sacubitriL-valsartan (ENTRESTO)  mg tablet 1 Tab, 1 Tab, Oral, BID, Disha Hawley MD, 1 Tab at 10/30/20 1726   tolterodine (DETROL) tablet 8 mg, 8 mg, Oral, BID, Disha Hawley MD 
  hydrALAZINE (APRESOLINE) 20 mg/mL injection 5 mg, 5 mg, IntraVENous, Q4H PRN, Disha Hawley MD 
  acetaminophen (TYLENOL) tablet 650 mg, 650 mg, Oral, Q4H PRN, Disha Hawley MD 
 
Facility-Administered Medications Ordered in Other Encounters:  
  [START ON 11/3/2020] ferumoxytoL (FERAHEME) 510 mg in 0.9% sodium chloride 100 mL, overfill volume 10 mL IVPB, 510 mg, IntraVENous, ONCE, Gaetano Weston MD 
[facility]  José Sow ON 11/3/2020] cloNIDine HCL (CATAPRES) tablet 0.1 mg, 0.1 mg, Oral, Q4H PRN, Gaetano Weston MD 
[facility]  [START ON 11/3/2020] diphenhydrAMINE (BENADRYL) capsule 25 mg, 25 mg, Oral, ONCE, Gaetano Weston MD 
[facility]  [START ON 11/3/2020] acetaminophen (TYLENOL) tablet 650 mg, 650 mg, Oral, ONCE, Gaetano Weston MD 
 
A/P 
  
S/P LVAD - good flows Need for anticoagulation - coumadin Depression - home meds Chronic wound infection - Abx's  
  
Risk of morbidity and mortality - high Medical decision making - high complexity 
  
 
Signed By: Serena Peng MD

## 2020-10-30 NOTE — PROGRESS NOTES
[facility] VAD/Heart Failure Progress Note Admit Date: 10/27/2020 POD:  * No surgery found * Procedure:      
 
 Subjective:  
Sternal wound infection; will review CT scan Objective:  
Vitals: 
Blood pressure (!) 90/58, pulse 60, temperature 97.8 °F (36.6 °C), resp. rate 18, height 5' 7\" (1.702 m), weight 296 lb 1.2 oz (134.3 kg), SpO2 95 %. Temp (24hrs), Av.3 °F (36.8 °C), Min:97.8 °F (36.6 °C), Max:98.8 °F (37.1 °C) Hemodynamics: 
 CO:   
 CI:   
 CVP:   
 SVR:   
 PAP Systolic:   
 PAP Diastolic:   
 PVR:   
 FA32:   
 SCV02:   
 
VAD Interrogation: LVAD (Heartmate) Pump Speed (RPM): 9400 Pump Flow (LPM): 4.4 PI (Pulsitility Index): 3.4 Power: 5.4 MAP: 68  Test: No 
Back Up  at Bedside & Labeled: Yes Power Module Test: No 
Driveline Site Care: No 
Driveline Dressing: Clean, Dry, and Intact EKG/Rhythm:   
 
Extubation Date / Time:  
 
CT Output:  
 
Ventilator: 
  
 
Oxygen Therapy: 
Oxygen Therapy O2 Sat (%): 95 % (10/30/20 1528) Pulse via Oximetry: 75 beats per minute (10/30/20 0940) O2 Device: Room air (10/30/20 1530) CXR: 
 
Admission Weight: Last Weight Weight: 288 lb 12.8 oz (131 kg) Weight: 296 lb 1.2 oz (134.3 kg) Intake / Output / Drain: 
Current Shift: 10/30 0701 - 10/30 1900 In: 1000 [P.O.:400; I.V.:600] Out: 800 [Urine:800] Last 24 hrs.:  
 
Intake/Output Summary (Last 24 hours) at 10/30/2020 1817 Last data filed at 10/30/2020 1530 Gross per 24 hour Intake 1350 ml Output 2200 ml Net -850 ml No results for input(s): CPK, CKMB, TROIQ in the last 72 hours. Recent Labs 10/30/20 
1230 10/30/20 
0355 10/29/20 
3918 10/28/20 
0330 10/27/20 
1831 NA  --  140 143 142 140 K  --  4.1 4.2 4.1 4.2 CO2  --  28 28 26 28 BUN  --  26* 26* 24* 28* CREA  --  1.59* 1.64* 1.38* 1.60* GLU  --  189* 169* 245* 192* PHOS  --   --   --  3.4  --   
MG  --  1.8 1.9 2.0  --   
WBC 9.1  --   --  7.0 8. 5 HGB 9.8*  --   --  8.8* 9.5* HCT 32.6*  --   --  30.2* 32.5*  
  --   --  189 218 Recent Labs 10/30/20 
0355 10/29/20 
5541 10/28/20 
1119 INR 2.4* 2.6* 2.7* PTP 23.7* 25.9* 27.0* APTT 47.3* 51.3* 52.1* No lab exists for component: PBNP Current Facility-Administered Medications:  
  magnesium oxide (MAG-OX) tablet 400 mg, 400 mg, Oral, BID, Chantal Chantel E, NP, 400 mg at 10/30/20 1727 
  [START ON 10/31/2020] Vancomycin trough at 0500 on 10/31, , Other, ONCE, Festus Ribeiro MD 
  bumetanide (BUMEX) tablet 2 mg, 2 mg, Oral, BID, Chantal Chantel, NP, 2 mg at 10/30/20 1726   cefepime (MAXIPIME) 2 g in 0.9% sodium chloride (MBP/ADV) 100 mL, 2 g, IntraVENous, Q24H, Puneet Méndez MD, Last Rate: 200 mL/hr at 10/30/20 1513, 2 g at 10/30/20 1513   ammonium lactate (LAC-HYDRIN) 12 % lotion, , Topical, BID, Oly Belle NP 
  albuterol (PROVENTIL VENTOLIN) nebulizer solution 2.5 mg, 2.5 mg, Nebulization, BID PRN, Disha Hawley MD 
  sodium chloride (NS) flush 5-40 mL, 5-40 mL, IntraVENous, Q8H, Disha Hawley MD, 10 mL at 10/30/20 1520 
  sodium chloride (NS) flush 5-40 mL, 5-40 mL, IntraVENous, PRN, Disha Hawley MD 
  polyethylene glycol (MIRALAX) packet 17 g, 17 g, Oral, DAILY PRN, Disha Hawley MD 
  glucose chewable tablet 16 g, 4 Tab, Oral, PRN, Disha Hawley MD 
  glucagon (GLUCAGEN) injection 1 mg, 1 mg, IntraMUSCular, PRN, Disha Hawley MD 
  Vancomycin dosing per pharmacy, , Other, Rx Dosing/Monitoring, Disha Hawley MD 
  vancomycin (VANCOCIN) 2000 mg in  ml infusion, 2,000 mg, IntraVENous, Q24H, Disha Hawley MD, Last Rate: 250 mL/hr at 10/30/20 0732, 2,000 mg at 10/30/20 3969   amLODIPine (NORVASC) tablet 2.5 mg, 2.5 mg, Oral, DAILY, Chantal Golden, NP, 2.5 mg at 10/30/20 3336   sodium chloride (NS) flush 5-40 mL, 5-40 mL, IntraVENous, Q8H, Chantal OWENS NP, 10 mL at 10/30/20 1520  sodium chloride (NS) flush 5-40 mL, 5-40 mL, IntraVENous, PRN, Otilia Blunt NP 
  Warfarin - Pharmacy to Dose, , Other, Rx Dosing/Monitoring, Feliberto Ward MD 
  dextrose 10% infusion 0-250 mL, 0-250 mL, IntraVENous, PRN, Otilia Blunt NP 
  insulin NPH (NOVOLIN N, HUMULIN N) injection 20 Units, 0.15 Units/kg, SubCUTAneous, BID, Otilia Blunt NP, 20 Units at 10/30/20 1726 
  insulin lispro (HUMALOG) injection, , SubCUTAneous, AC&HS, Otilia Blunt NP, 2 Units at 10/30/20 1726   aspirin delayed-release tablet 81 mg, 81 mg, Oral, DAILY, Disha Hawley MD, 81 mg at 10/30/20 0917 
  arformoterol 15 mcg/budesonide 0.5 mg neb solution, , Nebulization, BID RT, Disha Hawley MD 
  carvediloL (COREG) tablet 25 mg, 25 mg, Oral, BID WITH MEALS, Disha Hawley MD, 25 mg at 10/30/20 1726   ergocalciferol capsule 50,000 Units, 50,000 Units, Oral, Q7D, iDsha Hawley MD, 50,000 Units at 10/29/20 3567   ferrous sulfate tablet 325 mg, 1 Tab, Oral, DAILY WITH BREAKFAST, Disha Hawley MD, 325 mg at 10/30/20 0917 
  gabapentin (NEURONTIN) capsule 300 mg, 300 mg, Oral, DAILY, Disha Hawley MD, 300 mg at 10/30/20 2334   hydrOXYzine HCL (ATARAX) tablet 10 mg, 10 mg, Oral, TID PRN, Disha Hawley MD 
  icosapent ethyL (VASCEPA) capsule 1 Cap, 1 Cap, Oral, BID WITH MEALS, Disha Hawely MD 
  PARoxetine (PAXIL) tablet 30 mg, 30 mg, Oral, DAILY, Disha Hawley MD, 30 mg at 10/30/20 0916 
  sacubitriL-valsartan (ENTRESTO)  mg tablet 1 Tab, 1 Tab, Oral, BID, Disha Hawley MD, 1 Tab at 10/30/20 1726   tolterodine (DETROL) tablet 8 mg, 8 mg, Oral, BID, Disha Hawley MD 
  hydrALAZINE (APRESOLINE) 20 mg/mL injection 5 mg, 5 mg, IntraVENous, Q4H PRN, Disha Hawley MD 
  acetaminophen (TYLENOL) tablet 650 mg, 650 mg, Oral, Q4H PRN, Disha Hawley MD 
 
Facility-Administered Medications Ordered in Other Encounters:  
  [START ON 11/3/2020] ferumoxytoL (FERAHEME) 510 mg in 0.9% sodium chloride 100 mL, overfill volume 10 mL IVPB, 510 mg, IntraVENous, ONCE, MD Chanel Calhoun  [START ON 11/3/2020] cloNIDine HCL (CATAPRES) tablet 0.1 mg, 0.1 mg, Oral, Q4H PRN, MD Chanel Calhoun  [START ON 11/3/2020] diphenhydrAMINE (BENADRYL) capsule 25 mg, 25 mg, Oral, ONCE, MD Chanel Calhoun  [START ON 11/3/2020] acetaminophen (TYLENOL) tablet 650 mg, 650 mg, Oral, ONCE, Gaetano Weston MD 
 
A/P 
  
S/P LVAD - good flows Need for anticoagulation - coumadin Depression - home meds Chronic wound infection - Abx's  
  
Risk of morbidity and mortality - high Medical decision making - high complexity Signed By: Serena Peng MD

## 2020-10-30 NOTE — PROGRESS NOTES
0730: Bedside and Verbal shift change report given to Estrella Luna rn (oncoming nurse) by Chris Roman (offgoing nurse). Report included the following information SBAR, Kardex, Intake/Output, MAR, Recent Results and Cardiac Rhythm paced. 1130: Bedside and Verbal shift change report given to Brent Mcdermott (oncoming nurse) by Iraida Vaughn (offgoing nurse). Report included the following information SBAR, Kardex, Intake/Output, MAR, Recent Results and Cardiac Rhythm paced. Problem: Diabetes Self-Management Goal: *Disease process and treatment process Description: Define diabetes and identify own type of diabetes; list 3 options for treating diabetes. Outcome: Progressing Towards Goal 
Goal: *Incorporating nutritional management into lifestyle Description: Describe effect of type, amount and timing of food on blood glucose; list 3 methods for planning meals. Outcome: Progressing Towards Goal 
Goal: *Incorporating physical activity into lifestyle Description: State effect of exercise on blood glucose levels. Outcome: Progressing Towards Goal 
Goal: *Developing strategies to promote health/change behavior Description: Define the ABC's of diabetes; identify appropriate screenings, schedule and personal plan for screenings. Outcome: Progressing Towards Goal 
Goal: *Using medications safely Description: State effect of diabetes medications on diabetes; name diabetes medication taking, action and side effects. Outcome: Progressing Towards Goal 
Goal: *Monitoring blood glucose, interpreting and using results Description: Identify recommended blood glucose targets  and personal targets. Outcome: Progressing Towards Goal 
Goal: *Prevention, detection, treatment of acute complications Description: List symptoms of hyper- and hypoglycemia; describe how to treat low blood sugar and actions for lowering  high blood glucose level.  
Outcome: Progressing Towards Goal 
Goal: *Prevention, detection and treatment of chronic complications Description: Define the natural course of diabetes and describe the relationship of blood glucose levels to long term complications of diabetes. Outcome: Progressing Towards Goal 
Goal: *Developing strategies to address psychosocial issues Description: Describe feelings about living with diabetes; identify support needed and support network Outcome: Progressing Towards Goal 
Goal: *Patient Specific Goal (EDIT GOAL, INSERT TEXT) Outcome: Progressing Towards Goal 
  
Problem: Patient Education: Go to Patient Education Activity Goal: Patient/Family Education Outcome: Progressing Towards Goal 
  
Problem: Pressure Injury - Risk of 
Goal: *Prevention of pressure injury Description: Document Jaime Scale and appropriate interventions in the flowsheet. Outcome: Progressing Towards Goal 
Note: Pressure Injury Interventions: 
Sensory Interventions: Pressure redistribution bed/mattress (bed type) Moisture Interventions: Apply protective barrier, creams and emollients, Internal/External urinary devices Activity Interventions: Pressure redistribution bed/mattress(bed type) Mobility Interventions: HOB 30 degrees or less, Pressure redistribution bed/mattress (bed type) Nutrition Interventions: Document food/fluid/supplement intake Friction and Shear Interventions: Apply protective barrier, creams and emollients, Foam dressings/transparent film/skin sealants, HOB 30 degrees or less, Minimize layers Problem: Patient Education: Go to Patient Education Activity Goal: Patient/Family Education Outcome: Progressing Towards Goal 
  
Problem: Patient Education: Go to Patient Education Activity Goal: Patient/Family Education Outcome: Progressing Towards Goal 
  
Problem: Heart Failure: Day 3 Goal: Activity/Safety Outcome: Progressing Towards Goal 
Goal: Diagnostic Test/Procedures Outcome: Progressing Towards Goal 
Goal: Nutrition/Diet Outcome: Progressing Towards Goal 
Goal: Discharge Planning Outcome: Progressing Towards Goal 
Goal: Medications Outcome: Progressing Towards Goal 
Goal: Respiratory Outcome: Progressing Towards Goal 
Goal: Treatments/Interventions/Procedures Outcome: Progressing Towards Goal 
Goal: Psychosocial 
Outcome: Progressing Towards Goal 
Goal: *Oxygen saturation within defined limits Outcome: Progressing Towards Goal 
Goal: *Hemodynamically stable Outcome: Progressing Towards Goal 
Goal: *Optimal pain control at patient's stated goal 
Outcome: Progressing Towards Goal 
Goal: *Anxiety reduced or absent Outcome: Progressing Towards Goal 
Goal: *Demonstrates progressive activity Outcome: Progressing Towards Goal 
  
Problem: Falls - Risk of 
Goal: *Absence of Falls Description: Document Amber Valdivia Fall Risk and appropriate interventions in the flowsheet. Outcome: Progressing Towards Goal 
Note: Fall Risk Interventions: 
Mobility Interventions: Patient to call before getting OOB, Communicate number of staff needed for ambulation/transfer Medication Interventions: Teach patient to arise slowly, Patient to call before getting OOB Elimination Interventions: Patient to call for help with toileting needs, Call light in reach Problem: Patient Education: Go to Patient Education Activity Goal: Patient/Family Education Outcome: Progressing Towards Goal

## 2020-10-30 NOTE — DIABETES MGMT
15[address] [facility] PROGRAM FOR DIABETES HEALTH 
 
DIABETES FOLLOW UP NOTE Presentation Kanu Barger is a 78 y.o. female who was seen at the outpatient [facility] 1721 for follow of on chronic systolic heart failure. In the clinic she was noted to be in volume overload with concern for cellulitis. She was admitted for IV antibiotics and diuresis. HX: Chronic systolic heart failure s/p LVAD implantation with Heartmate II, Breast Cancer (1992) s/p bilateral mastectomy, endometrial cancer s/p hysterectomy, severe COPD, Depression, A Fib, CVA, s/p R hemiarthroplasty, CKD, pulmonary HTN, Hyperlipidemia, Diabetes DX: RV Failure, volume overload, RLE cellulitis, chronic sternal wound infection TX: ID consult, CT surgery for sternal wound consult, Diuresis Current clinical course has been uncomplicated. Diabetes: Patient has known Type two diabetes, treated with Levemir PTA. A1C 8.4% on admission. Family history positive for diabetes. Consulted by Provider for advanced diabetes nursing assessment and care, specifically related to  
[x] Inpatient management strategy [x] Home management assessment Diabetes-related medical history Acute complications: None Neurological complications Peripheral neuropathy Microvascular disease Nephropathy Macrovascular disease Cerebral vascular accident Diabetes medication history Drug class Currently in use Discontinued Never used Biguanide DDP-4 inhibitor Sulfonylurea Thiazolidinedione GLP-1 RA SGLT-2 inhibitors Basal insulin Levemir 40 units at bedtime Fixed Dose  Combinations Bolus insulin Subjective Admitting A1C 8.4% Glucose 167-199 NPH 20 units BID 
6 units correctional insulin in the past 24 hours Appetite ok GFR 30 
IV antibiotics started, Blood cultures NGTD Objective Physical exam 
General Alert, oriented and in no acute distress/ill-appearing. Conversant and cooperative. Vital Signs Visit Vitals BP (!) 90/58 (BP 1 Location: Right arm, BP Patient Position: At rest) Pulse 61 Temp 98.3 °F (36.8 °C) Resp 18 Ht 5' 7\" (1.702 m) Wt 134.3 kg (296 lb 1.2 oz) SpO2 94% BMI 46.37 kg/m² Skin  Warm and dry. Acanthosis noted along neckline. No lipohypertrophy or lipoatrophy noted at injection sites Heart   Regular rate and rhythm. No murmurs, rubs or gallops Lungs  Clear to auscultation without rales or rhonchi Extremities Sacral Wound POA, small healing sternal wound, RLE cellulitis Laboratory Lab Results Component Value Date/Time Hemoglobin A1c 8.4 (H) 10/28/2020 03:30 AM  
 
Lab Results Component Value Date/Time LDL Chol Calc (NIH) 123 (H) 10/01/2020 12:00 AM  
 
Lab Results Component Value Date/Time Creatinine 1.59 (H) 10/30/2020 03:55 AM  
 
Lab Results Component Value Date/Time Sodium 140 10/30/2020 03:55 AM  
 Potassium 4.1 10/30/2020 03:55 AM  
 Chloride 106 10/30/2020 03:55 AM  
 CO2 28 10/30/2020 03:55 AM  
 Anion gap 6 10/30/2020 03:55 AM  
 Glucose 189 (H) 10/30/2020 03:55 AM  
 BUN 26 (H) 10/30/2020 03:55 AM  
 Creatinine 1.59 (H) 10/30/2020 03:55 AM  
 BUN/Creatinine ratio 16 10/30/2020 03:55 AM  
 GFR est AA 39 (L) 10/30/2020 03:55 AM  
 GFR est non-AA 32 (L) 10/30/2020 03:55 AM  
 Calcium 8.9 10/30/2020 03:55 AM  
 Bilirubin, total 0.3 10/30/2020 03:55 AM  
 Alk. phosphatase 76 10/30/2020 03:55 AM  
 Protein, total 7.0 10/30/2020 03:55 AM  
 Albumin 2.9 (L) 10/30/2020 03:55 AM  
 Globulin 4.1 (H) 10/30/2020 03:55 AM  
 A-G Ratio 0.7 (L) 10/30/2020 03:55 AM  
 ALT (SGPT) 7 (L) 10/30/2020 03:55 AM  
 
Lab Results Component Value Date/Time ALT (SGPT) 7 (L) 10/30/2020 03:55 AM  
 
 
Factors affecting BG pattern Factor Dose Comments Nutrition: 
Carb-controlled meals 60 grams/meal On cardiac consistent carb diet Drugs: 
IV antibiotics On Zosyn, Vancomycin Pain Sacral pain   
Infection Sternal wound infection RLE cellulitis Other: CKD GFR 31 Blood glucose pattern Assessment and Plan Nursing Diagnosis Risk for unstable blood glucose pattern Nursing Intervention Domain 9633 Decision-making Support Nursing Interventions Examined current inpatient diabetes control Explored factors facilitating and impeding inpatient management Identified self-management practices impeding diabetes control Explored corrective strategies with patient and responsible inpatient provider Informed patient of rational for insulin strategy while hospitalized Instructed patient in correlation with elevated glucose and wound heeling, A1C and long term complications Evaluation King Ginette is a 78year old female with uncontrolled diabetes on once daily Levemir with LVAD who is admitted for RV failure, volume overload, RLE cellulitis and work-up for chronic sternal wound infection. A1C on admission is elevated at 8.4% and patient reports once daily glucose monitoring. Glucose certainly impacted by impaired insulin delivery due to perfusion, chronic infection and oral intake. Discussed the impact of elevated glucose on wound healing. It will be essential for her to have glucose control at this time due to current wounds and preventions of additional long term complications. Basal and correctional insulin restarted and glucose in goal of 100-180 in the inpatient setting. Recommendations Recommend: 1. Continue moderate dose basal insulin: NPH 20 units BID (0.15 units/kg/BID). a. Increase to 0.2 units/kg BID for fasting glucose over 200 
b. Decrease to 0.1 units/kg BID for fasting glucose under 100 2. Correctional insulin ACHS at resistant sensitivity, start at a glucose of 200 
 
3. If patient experiencing pre-prandial hyperglycemia, add low dose bolus insulin. 0.05 units/kg/meal. Hold if patient consumes less than 50% of carbohydrates on meal tray On Discharge: 
1. Insulin adjustment based on glucose pattern in the inpatient setting. 2. Patient to obtain glucose more frequently than fasting. Please check before meals and bedtime to ensure a rise in glucose throughout the day. A1C goal 7-7.5% 2. Patient needs a FUV with PCP within 1-2 weeks of hospital discharge for ongoing diabetes management. Patient does prefer to be seen by PCP which is reasonable at this time. If patient does not have glucose control at PCP, may benefit from specialty care. Billing Code(s) I personally reviewed chart, notes, data and current medications in the medical record, and examined the patient at bedside before making care recommendations. Thank you for including us in their care. I spent 15 minutes in direct patient care today for this patient. Time includes chart review, face to face with patient and collaboration with interdisciplinary care team. 
 
Yahaira Boles, CNS Program for Diabetes Health Access via [address] [place] [address] [phone]

## 2020-10-30 NOTE — PROGRESS NOTES
Pharmacist Note  Warfarin Dosing Consult provided for this 78 y. o.female to manage warfarin for Atrial Fibrillation, LVAD INR Goal: 2 - 2.5 Home regimen/ tablet size: 4mg qd Drugs that may increase INR: None Drugs that may decrease INR: None Other current anticoagulants/ drugs that may increase bleeding risk: None Risk factors: Age > 65 and Decompensated Heart Failure Daily INR ordered: YES Recent Labs 10/30/20 
0355 10/29/20 
2324 10/28/20 
1119  10/28/20 
0330 10/27/20 
1831 HGB  --   --   --   --  8.8* 9.5* INR 2.4* 2.6* 2.7*   < >  --   --   
 < > = values in this interval not displayed. Date               INR                  Dose 
10/26  2.9  4 mg  
10/27  --  --  
10/28  2.7  4 mg  
10/29  2.6  4 mg  
10/30  2.4  4.5 mg 
                                                                            
Assessment/ Plan: Will order warfarin 4.5 mg PO x 1 dose. Pharmacy will continue to monitor daily and adjust therapy as indicated. Please contact the pharmacist at [phone] for outpatient recommendations if needed.

## 2020-10-30 NOTE — PROGRESS NOTES
1130 Bedside shift change report given to Anthony Joyce RN (oncoming nurse) by Estrella Luna RN (offgoing nurse). Report included the following information SBAR, Kardex, Intake/Output, MAR and Recent Results. 1530 Bedside shift change report given to PADILLA Teixeira (oncoming nurse) by Antohny Joyce RN (offgoing nurse). Report included the following information SBAR, Kardex, Intake/Output, MAR and Recent Results.

## 2020-10-30 NOTE — PROGRESS NOTES
Problem: Mobility Impaired (Adult and Pediatric) Goal: *Acute Goals and Plan of Care (Insert Text) Description: FUNCTIONAL STATUS PRIOR TO ADMISSION: Patient was modified independent using a rollator walker for functional mobility. HOME SUPPORT PRIOR TO ADMISSION: The patient lived alone with daughter lives in the area available to provide assistance. Patient has groceries delivered, cleaning person every other week. Physical Therapy Goals Initiated 10/30/2020 1. Patient will move from supine to sit and sit to supine , scoot up and down, and roll side to side in bed with modified independence within 7 day(s). 2.  Patient will transfer from bed to chair and chair to bed with modified independence using the least restrictive device within 7 day(s). 3.  Patient will ambulate with modified independence for 300 feet with the least restrictive device within 7 day(s). Outcome: Progressing Towards Goal 
 PHYSICAL THERAPY EVALUATION Patient: Raf Pena (22 y.o. female) Date: 10/30/2020 Primary Diagnosis: Acute on chronic systolic CHF (congestive heart failure) ([facility] [address] [I50.23] Precautions: LVAD; fall ASSESSMENT Based on the objective data described below, the patient presents with decreased activity tolerance, increased SOB w/ activity, impaired balance (baseline), and weakness (primarily right hip d/t a prior hip surgery), BLE edema, wounds (fresh blood noted on her briefs - RN aware). Patient is independent with LVAD management. Functionally, she is modified independent with sit<->stand, supervision for bed<->chair and to ambulate. She ambulated 150 ft with one standing rest break due to fatigue, sob after walking about 100 ft. She was able to complete the distance back to the room with cues to slow down to pace self. Patient endorses improvement in her mobility as compared with yesterday. Therapy will continue to follow.   Anticipate she will be able to return home with no skilled therapy follow up needs. Current Level of Function Impacting Discharge (mobility/balance): supervision Functional Outcome Measure: The patient scored 19/28 on the Tinetti outcome measure which is indicative of moderate fall risk. Other factors to consider for discharge: lives alone Patient will benefit from skilled therapy intervention to address the above noted impairments. PLAN : 
Recommendations and Planned Interventions: bed mobility training, transfer training, gait training, patient and family training/education, and therapeutic activities Frequency/Duration: Patient will be followed by physical therapy:  3 times a week to address goals. Recommendation for discharge: (in order for the patient to meet his/her long term goals) Anticipate no skilled physical therapy/ follow up rehabilitation needs This discharge recommendation: 
Has not yet been discussed the attending provider and/or case management IF patient discharges home will need the following DME: patient owns DME required for discharge SUBJECTIVE:  
Patient stated I'm doing much better today.  OBJECTIVE DATA SUMMARY:  
HISTORY:   
Past Medical History:  
Diagnosis Date  Asthma  Cancer ([facility] [address] breast  
 [facility]   
 endometrial  
 Congestive heart failure, unspecified  CRI (chronic renal insufficiency)  Depression  Diabetes ([facility]  Hypertension Past Surgical History:  
Procedure Laterality Date  HX HERNIA REPAIR    
 HX HYSTERECTOMY  HX MASTECTOMY Personal factors and/or comorbidities impacting plan of care: PMH Home Situation Home Environment: Private residence # Steps to Enter: 1(about 3 inches high) One/Two Story Residence: One story Living Alone: Yes Support Systems: Child(ivania) Patient Expects to be Discharged to[de-identified] Private residence Current DME Used/Available at Home: Gudelia Davenport, rollator, Shower chair, Grab bars( LVAD, Trilogy, adjustable bed) Tub or Shower Type: Shower EXAMINATION/PRESENTATION/DECISION MAKING:  
Critical Behavior: 
Neurologic State: Alert Orientation Level: Oriented X4 Cognition: Appropriate decision making, Appropriate for age attention/concentration, Appropriate safety awareness, Follows commands Safety/Judgement: Awareness of environment Hearing: Auditory Auditory Impairment: None Skin:  Observed fresh blood on depends near right groin. RN made aware. Edema: BLE (pt notes improvement in swelling since admission) Range Of Motion: 
AROM: Generally decreased, functional 
  
  
  
  
  
  
  
Strength:   
Strength: Generally decreased, functional 
  
  
  
  
  
  
Tone & Sensation:  
Tone: Normal 
  
  
  
  
  
  
  
  
   
Coordination: 
Coordination: Within functional limits Vision:  
  
Functional Mobility: 
Bed Mobility: 
N/t* pt sitting up in the chair on arrival and post session. Patient notes h/o difficulty getting LE's into the bed for which she uses the grabber to assist at home. Transfers: 
Sit to Stand: Modified independent Stand to Sit: Modified independent Balance:  
Sitting: Intact; Without support Standing: Impaired; Without support Standing - Static: Fair Standing - Dynamic : Fair Ambulation/Gait Training: 
Distance (ft): 150 Feet (ft) Assistive Device: Walker, rollator;Gait belt Ambulation - Level of Assistance: Supervision;Assist x1;Adaptive equipment Gait Description (WDL): Exceptions to [facility] [place] Gait Abnormalities: Decreased step clearance; Other(slight limp from old hip replacement) Base of Support: Widened Speed/Ivana: Slow Step Length: Right shortened;Left shortened Functional Measure: 
Tinetti test: 
 
Sitting Balance: 1 Arises: 1 Attempts to Rise: 2 Immediate Standing Balance: 2 Standing Balance: 1 Nudged: 1(inferred) Eyes Closed: 0(inferred0) Turn 360 Degrees - Continuous/Discontinuous: 1 Turn 360 Degrees - Steady/Unsteady: 1(w/ walker1) Sitting Down: 2 Balance Score: 12 Balance total score Indication of Gait: 1 
R Step Length/Height: 1 L Step Length/Height: 1 
R Foot Clearance: 1 L Foot Clearance: 1 Step Symmetry: 0 Step Continuity: 1 Path: 1 Trunk: 0 Walking Time: 0 Gait Score: 7 Gait total score Total Score: 19/28 Overall total score Tinetti Tool Score Risk of Falls 
<19 = High Fall Risk 19-24 = Moderate Fall Risk 25-28 = Low Fall Risk Tinetti ME. Performance-Oriented Assessment of Mobility Problems in Elderly Patients. [facility]; [id number]. (Scoring Description: PT Bulletin Feb. 10, 1993) Older adults: Darlenemary alice Thompson et al, 2009; n = [address] [place] elderly evaluated with ABC, FELIZ, ADL, and IADL) · Mean FELIZ score for males aged 69-68 years = 26.21(3.40) · Mean FELIZ score for females age 69-68 years = 25.16(4.30) · Mean FELIZ score for males over 80 years = 23.29(6.02) · Mean FELIZ score for females over 80 years = 17.20(8.32) Physical Therapy Evaluation Charge Determination History Examination Presentation Decision-Making MEDIUM  Complexity : 1-2 comorbidities / personal factors will impact the outcome/ POC  LOW Complexity : 1-2 Standardized tests and measures addressing body structure, function, activity limitation and / or participation in recreation  LOW Complexity : Stable, uncomplicated  LOW Complexity : FOTO score of  Based on the above components, the patient evaluation is determined to be of the following complexity level: LOW Pain Rating: 
Voiced no pain (0/10) Activity Tolerance:  
Fair, requires rest breaks, and observed SOB with activity Please refer to the flowsheet for vital signs taken during this treatment. After treatment patient left in no apparent distress:  
Sitting in chair, Call bell within reach, and RN in the room COMMUNICATION/EDUCATION:  
The patients plan of care was discussed with: Registered nurse.   
 
Fall prevention education was provided and the patient/caregiver indicated understanding., Patient/family have participated as able in goal setting and plan of care. , and Patient/family agree to work toward stated goals and plan of care. Thank you for this referral. 
Dao Abernathy, PT Time Calculation: 34 mins

## 2020-10-30 NOTE — PROGRESS NOTES
ID Progress Note 10/30/2020 Subjective:  
Denies any discomfort at present time. Review of Systems: 
         
Symptom Y/N Comments   Symptom Y/N Comments Fever/Chills n      Chest Pain n      
Poor Appetite       Edema Cough       Abdominal Pain Sputum       Joint Pain SOB/HAIRSTON  n     Pruritis/Rash Nausea/vomit  n     Tolerating PT/OT Diarrhea  n     Tolerating Diet Constipation  n     Other Could NOT obtain due to:    
 
Objective:  
 
Vitals:  
Visit Vitals BP (!) 90/58 (BP 1 Location: Right arm, BP Patient Position: At rest) Pulse 64 Temp 98.4 °F (36.9 °C) Resp 16 Ht 5' 7\" (1.702 m) Wt 135.8 kg (299 lb 6.2 oz) SpO2 94% BMI 46.89 kg/m² Tmax:  Temp (24hrs), Av.5 °F (36.9 °C), Min:98.3 °F (36.8 °C), Max:99 °F (37.2 °C) PHYSICAL EXAM: 
General: Morbidly obese. Alert, cooperative, no acute distress EENT:  EOMI. Anicteric sclerae. MMM Resp:  Clear in apex with decreased breath sounds at bases, no wheezing or rales. No accessory muscle use CV:  Regular  rhythm,  trace of pitting edema, VAD hum, Clear drainage from sternal wound; size - base of Q-tip GI:  Soft, Non distended, Non tender. +Bowel sounds Neurologic:  Alert and oriented X 3, normal speech, Psych:   Good insight. Not anxious nor agitated Skin:  bumpy, dusky below knees, right anterior wound packed with nu guaze, diffuse erythema, not tender to touch . No jaundice Labs:  
Lab Results Component Value Date/Time WBC 7.0 10/28/2020 03:30 AM  
 HGB 8.8 (L) 10/28/2020 03:30 AM  
 HCT 30.2 (L) 10/28/2020 03:30 AM  
 PLATELET 972  03:30 AM  
 MCV 84.6 10/28/2020 03:30 AM  
 
Lab Results Component Value Date/Time  Sodium 140 10/30/2020 03:55 AM  
 Potassium 4.1 10/30/2020 03:55 AM  
 Chloride 106 10/30/2020 03:55 AM  
 CO2 28 10/30/2020 03:55 AM  
 Anion gap 6 10/30/2020 03:55 AM  
 Glucose 189 (H) 10/30/2020 03:55 AM  
 BUN 26 (H) 10/30/2020 03:55 AM  
 Creatinine 1.59 (H) 10/30/2020 03:55 AM  
 BUN/Creatinine ratio 16 10/30/2020 03:55 AM  
 GFR est AA 39 (L) 10/30/2020 03:55 AM  
 GFR est non-AA 32 (L) 10/30/2020 03:55 AM  
 Calcium 8.9 10/30/2020 03:55 AM  
 Bilirubin, total 0.3 10/30/2020 03:55 AM  
 Alk. phosphatase 76 10/30/2020 03:55 AM  
 Protein, total 7.0 10/30/2020 03:55 AM  
 Albumin 2.9 (L) 10/30/2020 03:55 AM  
 Globulin 4.1 (H) 10/30/2020 03:55 AM  
 A-G Ratio 0.7 (L) 10/30/2020 03:55 AM  
 ALT (SGPT) 7 (L) 10/30/2020 03:55 AM  
 
Chest CT (10/27/2020): In the midline inferior to the sternum and expected location of the manubrium 
and apparently socially with skin defect is a subcutaneous oval-shaped density 
measuring between 35 and 54 Hounsfield units. This has well-defined margins 
without adjacent stranding. This could represent high density infected fluid. This could represent granulation tissue this could represent a combination of 
both. This does extend down to the level of the left ventricular assist device 
outflow tract anteriorly. There is no associated gas collection. Slightly more 
superiorly and along the anterior aspect of the outflow tract cannula is a small 
triangular area of soft tissue density measuring 54 Hounsfield units that could 
represent granulation tissue or inflammatory reaction. This is not associated 
with fluid density or air density Assessment and Plan Right lower extremity cellulitis in presence of venous stasis ? Sternal wound infection 
- blood cx (10/27) no growth so far 
  wound cx from sternal and right lower extremity wound to be sent Continue with IV cefepime and Vancomycin 
  
LVAD/ICD Acute on chronic systolic heart failure - cardiology following EF 15-20% 
  
Type III DM with neuropathy - A1C 8.4. continue with insulin therapy Continue with neurontin 
  
ROCIO on CKD 
- creat 1.38->1.64->1.59; continue to monitor   Management per primary care team 
 
 
 
Oly Casas, NP

## 2020-10-31 NOTE — PROGRESS NOTES
[address] [facility] Heart Failure Inpatient Progress Note Patient name:   Patient : 1950 Patient MRN: [id number] Date of service: 10/31/20 CHIEF COMPLAINT: 
Cellulitis 
  
PLAN: 
Continue current device speed 9400rpm; increase speed once MAPs under control TTE today due to elevated flows (>8lpm) Continue current medical therapy for heart failure Continue current dose of coreg 25mg twice daily Continue entresto to 49/51mg twice daily Increase norvasc to 2.5mg twice daily No spironolactone due to hyperkalemia Magnesium oxide 400mg daily, check Mg with labs Patient states she has reaction to allopurinol (leg edema), will hold and try colchicine or uloric Continue synthroid, TSH & Free T4 at goal 
vitamin D,  high dose weekly x 12 weeks Chronic anticoagulation with coumadin, dosage per pharmacy (goal 2-2.5) Check iron profile/ferritin after second venofer infusion, obtain hemoccult Continue baby ASA Patient not taking statin, retrieve medical records for reason PPM interrogation today - ?inappropriately sensing Continue CPAP for JED Antibiotics per ID consult/primary team, wound cx pending Sternal wound infection per CTS 
DTC consult recs appreciated, HgbA1C -8.4% NPH and SSI per DTC recs Ambulate daily/PT/OT Plan on [address] early next week prior to discharge Note: Needs multiple referrals prior to hospital discharge: nutritionist, GYN and urogynecologist, pulmonologist, EP cardiologist; also patient needs flu shot and review records if has pneumonia vaccine at [facility]: 
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 Jay Jay Lau [facility]) · C/b delayed extubation due to severe COPD 
· C/b critical illness polyneuropathy · C/b prolonged hospitalization post-LVAD, 55 days · C/b sternal wound infection s/p debridement (by Dr. Jay Jay Lau) s/p wound vac · C/b sacral ulcer · Would culture positive for Staph aureus, not MRSA · C/b LVAD site drainage, improved S/p CRT-ICD · ICD fired due to electrolyte imbalance () H/o breast cancer () · s/p bilateral mastectomy/chemo and endometrial cancer s/p hysterectomy · Lymphedema of LLE due to cancer treatment Severe COPD with FEV1 50% Depression Atrial fibrillation H/o \"two mini strokes\" S/p fall with hip facture · Right hip hemmiarthroplasty (18) by Dr. Sheeba Greenwood) · S/p removed hip hardwarare due to pain (4/15/19) COPD severe CKD, stage 3 Hyperkalemia Pulmonary hypertension Cardiac risk factors: · Morbid obesity, Body mass index is 46.1 kg/m². · DM2 insulin dependent · JED on CPAP 
· HL Urinary incontinence, severe · no procedures due to anticoagulation · conservative management with Detrol 4 pills bid Endometrial cancer () HTN 
HL 
  
CARDIAC IMAGING: 
Echo (19) LVEF 20-25%, AV opens 1:1, no AR Echo (18) LVEF 10-%, ramps study done, report in epic Echo (18) ramp study done, LVEDD 7.1cm LHC (18) 2 vessel disease with 90% OM, 80% PDA, DSA to PDA branch 
TTE (10/27/20) LVEF 15-20% LVIDd 7.26cm RVIDd 2.82cm Tapse 1.14cm 
  
HEMODYNAMICS: 
RHC not done CPEST not done 6MW not done 
  
OTHER IMAGING: 
EGD/C-scope (19) no active bleeding and polyp removed. 
  
FAMILY HISTORY: 
Mother  76 years, diagnosed with DM, HTN, CAD Father  [de-identified]years old, HTN, CAD, MI 
Positive for DM and heart disease in the family, brother with valve replacement 
  
SOCIAL HISTORY: 
Never alcohol, never smoked, , lives alone, no illicit drug use, lives in house, ambulatory status indpendedn, children: daughter, occupation retired Lives alone. 
  
ALLERGIES: benzodiazepines and quetiapine fumerate (lethargy, resp failure to both) 
  
HISTORY OF PRESENT ILLNESS: 
Briefly, Abdirashid Gutierrez is a 78 y.o. female with multiple comorbidities listed above presents to our clinic requesting transfer of care from [facility] LVAD program. 
  
REVIEW OF SYSTEMS: 
General: Denies fever Ear, nose and throat: Denies difficulty hearing, sinus problems, runny nose, post-nasal drip, ringing in ears, mouth sores, loose teeth, ear pain, nosebleeds, sore throate, facial pain or numbess Cardiovascular: see above in the interval history Respiratory: denies cough, wheezing, sputum production, or hemoptysis. Gastrointestinal: Denies heartburn, constipation, intolerance to certain foods, diarrhea, abdominal pain, nausea, vomiting, difficulty swallowing, blood in stool Kidney and bladder: Denies painful urination, frequent urination, or urgency Musculoskeletal: Denies joint pain, muscle weakness Skin and hair: Denies change in hair loss or increase, breast changes Redness and edema, skin breakdown RLE. PHYSICAL EXAM: 
Visit Vitals BP (!) 90/58 (BP 1 Location: Right arm, BP Patient Position: At rest) Pulse 67 Temp 97.9 °F (36.6 °C) Resp 16 Ht 5' 7\" (1.702 m) Wt 294 lb 5 oz (133.5 kg) SpO2 100% BMI 46.10 kg/m² LVAD Pump Speed (RPM): 9400 Pump Flow (LPM): 7 MAP: 72 
PI (Pulsitility Index): 5.1 Power: 6.8  Test: No 
Back Up  at Bedside & Labeled: Yes Power Module Test: No 
Driveline Site Care: No 
Driveline Dressing: Clean, Dry, and Intact Outpatient: No 
MAP in Therapeutic Range (Outpatient): Yes Testing Alarms Reviewed: Yes 
Back up SC speed: 9400 Back up Low Speed Limit: 9000 Emergency Equipment with Patient?: Yes Emergency procedures reviewed?: Yes Drive line site inspected?: No 
Drive line intergrity inspected?: Yes Drive line dressing changed?: No 
 
 
General: Patient is morbidly obese in no acute distress, sitting up in chair HEENT: Normocephalic and atraumatic. No scleral icterus. Pupils are equal, round and reactive to light and accomodation. No conjunctival injection. Oropharynx is clear. Neck: Supple. No evidence of thyroid enlargements or lymphadenopathy. JVD: Cannot be appreciated Lungs: Breath sounds are equal and clear bilaterally. No wheezes, rhonchi, or rales. Heart: VAD hum Abdomen: Soft, obese, no mass or tenderness. No organomegaly or hernia. Bowel sounds present. Genitourinary and rectal: deferred Extremities: No cyanosis, clubbing, Redness and edema, skin breakdown RLE; BLE edema 3+, venous stasis Neurologic: No focal sensory or motor deficits are noted. Grossly intact. Psychiatric: Awake, alert and oriented x 3. Appropriate mood and affect. Skin: Warm, dry, no nodules + petechiae on extremities PAST MEDICAL HISTORY: 
Past Medical History:  
Diagnosis Date  Asthma  Cancer ([facility] [address] breast  
 Cancer ([facility] [address]   
 endometrial  
 Congestive heart failure, unspecified  CRI (chronic renal insufficiency)  Depression  Diabetes ([facility] [address]  Hypertension PAST SURGICAL HISTORY: 
Past Surgical History:  
Procedure Laterality Date  HX HERNIA REPAIR    
 HX HYSTERECTOMY  HX MASTECTOMY FAMILY HISTORY: 
History reviewed. No pertinent family history. SOCIAL HISTORY: 
Social History Socioeconomic History  Marital status:  Spouse name: Not on file  Number of children: Not on file  Years of education: Not on file  Highest education level: Not on file Tobacco Use  Smoking status: Never Smoker  Smokeless tobacco: Never Used Substance and Sexual Activity  Alcohol use: Not Currently LABORATORY RESULTS: 
Labs Latest Ref Rng & Units 10/31/2020 10/30/2020 10/29/2020 10/28/2020 10/27/2020 10/1/2020 WBC 3.6 - 11.0 K/uL 7.1 9.1 - 7.0 8.5 7.2 RBC 3.80 - 5.20 M/uL 3.77(L) 3.90 - 3.57(L) 3.81 3.75(L) Hemoglobin 11.5 - 16.0 g/dL 9.5(L) 9.8(L) - 8. 8(L) 9.5(L) 9.3(L) Hematocrit 35.0 - 47.0 % 32. 0(L) 32. 6(L) - 30. 2(L) 32. 5(L) 29. 7(L) MCV 80.0 - 99.0 FL 84.9 83.6 - 84.6 85.3 79 Platelets 224 - 294 K/uL 202 248 - 189 218 225 Lymphocytes 12 - 49 % - - - 15 17 - Monocytes 5 - 13 % - - - 5 5 - Eosinophils 0 - 7 % - - - 3 3 - Basophils 0 - 1 % - - - 0 1 - Albumin 3.5 - 5.0 g/dL 3. 1(L) 2. 9(L) 2. 7(L) 2. 9(L) 3. 3(L) 3.8 Calcium 8.5 - 10.1 MG/DL 8.7 8.9 8. 0(L) 8.5 8.7 9.0 Glucose 65 - 100 mg/dL 172(H) 189(H) 169(H) 245(H) 192(H) 182(H) BUN 6 - 20 MG/DL 30(H) 26(H) 26(H) 24(H) 28(H) 27 Creatinine 0.55 - 1.02 MG/DL 1.72(H) 1.59(H) 1.64(H) 1.38(H) 1.60(H) 1.32(H) Sodium 136 - 145 mmol/L 141 140 143 142 140 140 Potassium 3.5 - 5.1 mmol/L 4.0 4.1 4.2 4.1 4.2 5.4(H) TSH 0.450 - 4.500 uIU/mL - - - - - 2.980 LDH 81 - 246 U/L 316(H) 330(H) 309(H) - 324(H) 227(H) Some recent data might be hidden ALLERGY: 
No Known Allergies CURRENT MEDICATIONS: 
 
Current Facility-Administered Medications:  
  warfarin (COUMADIN) tablet 4 mg, 4 mg, Oral, ONCE, Festus Ribeiro MD 
  DAPTOmycin (CUBICIN) 550 mg in 0.9% sodium chloride 50 mL IVPB RF formulation, 4 mg/kg, IntraVENous, Q24H, Oly Belle NP, Last Rate: 100 mL/hr at 10/31/20 1309, 550 mg at 10/31/20 1309   magnesium oxide (MAG-OX) tablet 400 mg, 400 mg, Oral, BID, Ranjit OWENS NP, 400 mg at 10/31/20 1017   bumetanide (BUMEX) tablet 2 mg, 2 mg, Oral, BID, Ranjit OWENS NP, 2 mg at 10/31/20 1018   cefepime (MAXIPIME) 2 g in 0.9% sodium chloride (MBP/ADV) 100 mL, 2 g, IntraVENous, Q24H, Puneet Méndez MD, Last Rate: 200 mL/hr at 10/31/20 1308, 2 g at 10/31/20 1308   ammonium lactate (LAC-HYDRIN) 12 % lotion, , Topical, BID, Oly Belle NP 
 albuterol (PROVENTIL VENTOLIN) nebulizer solution 2.5 mg, 2.5 mg, Nebulization, BID PRN, Disha Hawley MD 
  sodium chloride (NS) flush 5-40 mL, 5-40 mL, IntraVENous, Q8H, Disha Hawley MD, 10 mL at 10/31/20 0783   sodium chloride (NS) flush 5-40 mL, 5-40 mL, IntraVENous, PRN, Disha Hawley MD 
  polyethylene glycol (MIRALAX) packet 17 g, 17 g, Oral, DAILY PRN, Disha Hawley MD 
  glucose chewable tablet 16 g, 4 Tab, Oral, PRN, Disha Hawley MD 
  glucagon (GLUCAGEN) injection 1 mg, 1 mg, IntraMUSCular, PRN, Disha Hawley MD 
  amLODIPine (NORVASC) tablet 2.5 mg, 2.5 mg, Oral, DAILY, Jannette OWENS NP, 2.5 mg at 10/31/20 1017 
  sodium chloride (NS) flush 5-40 mL, 5-40 mL, IntraVENous, Q8H, Jannette OWENS NP, 10 mL at 10/30/20 2149   sodium chloride (NS) flush 5-40 mL, 5-40 mL, IntraVENous, PRN, Jannette Ornelas NP 
  Warfarin - Pharmacy to Dose, , Other, Rx Dosing/Monitoring, Rebecca Ward MD 
  dextrose 10% infusion 0-250 mL, 0-250 mL, IntraVENous, PRN, Jannette Ornelas NP 
  insulin NPH (NOVOLIN N, HUMULIN N) injection 20 Units, 0.15 Units/kg, SubCUTAneous, BID, Jannette Ornelas NP, 20 Units at 10/31/20 1020 
  insulin lispro (HUMALOG) injection, , SubCUTAneous, AC&HS, Jannette Ornelas NP, 2 Units at 10/31/20 1308   aspirin delayed-release tablet 81 mg, 81 mg, Oral, DAILY, Disha Hawley MD, 81 mg at 10/31/20 1018   arformoterol 15 mcg/budesonide 0.5 mg neb solution, , Nebulization, BID RT, Disha Hawley MD 
  carvediloL (COREG) tablet 25 mg, 25 mg, Oral, BID WITH MEALS, Disha Hawley MD, 25 mg at 10/31/20 1023   ergocalciferol capsule 50,000 Units, 50,000 Units, Oral, Q7D, Disha Hawley MD, 50,000 Units at 10/29/20 3990   ferrous sulfate tablet 325 mg, 1 Tab, Oral, DAILY WITH BREAKFAST, Disha Hawley MD, 325 mg at 10/31/20 1023 
  gabapentin (NEURONTIN) capsule 300 mg, 300 mg, Oral, DAILY, Marleen Disha ENCARNACION MD, 300 mg at 10/31/20 1015   hydrOXYzine HCL (ATARAX) tablet 10 mg, 10 mg, Oral, TID PRN, Disha Hawley MD 
  icosapent ethyL (VASCEPA) capsule 1 Cap, 1 Cap, Oral, BID WITH MEALS, Disha Hawley MD, Stopped at 10/31/20 0800   PARoxetine (PAXIL) tablet 30 mg, 30 mg, Oral, DAILY, Disha Hawley MD, 30 mg at 10/31/20 1017 
  sacubitriL-valsartan (ENTRESTO)  mg tablet 1 Tab, 1 Tab, Oral, BID, Marian ENCARNACION MD, 1 Tab at 10/31/20 1015   tolterodine (DETROL) tablet 8 mg, 8 mg, Oral, BID, Disha Hawley MD, Stopped at 10/31/20 0900   hydrALAZINE (APRESOLINE) 20 mg/mL injection 5 mg, 5 mg, IntraVENous, Q4H PRN, Disha Hawley MD 
  acetaminophen (TYLENOL) tablet 650 mg, 650 mg, Oral, Q4H PRN, Disha Hawley MD 
 
Facility-Administered Medications Ordered in Other Encounters:  
  [START ON 11/3/2020] ferumoxytoL (FERAHEME) 510 mg in 0.9% sodium chloride 100 mL, overfill volume 10 mL IVPB, 510 mg, IntraVENous, ONCE, Mainor Augustine MD 
[address] [place] [address]  [START ON 11/3/2020] cloNIDine HCL (CATAPRES) tablet 0.1 mg, 0.1 mg, Oral, Q4H PRN, Mainor Augustine MD 
[address] [place] [address]  [START ON 11/3/2020] diphenhydrAMINE (BENADRYL) capsule 25 mg, 25 mg, Oral, ONCE, Mainor Augustine MD 
[address] [place] [address]  [START ON 11/3/2020] acetaminophen (TYLENOL) tablet 650 mg, 650 mg, Oral, ONCE, Mainor Augustine MD 
 
Thank you for your referral and allowing me to participate in this patient's care. TEA Escalante [address] [facility] 
[address] [facility], [address] Phone: [phone] Fax: [phone] PATIENT CARE TEAM: 
Patient Care Team: 
Fan Corrales, NP as PCP - General (Nurse Practitioner)

## 2020-10-31 NOTE — PROGRESS NOTES
[facility] Adult  Hospitalist Group Hospitalist Progress Note Elmira Ambriz MD 
Answering service: [phone] OR [phone] from in house phone Date of Service:  10/31/2020 NAME:  Kesha Root :  1950 MRN:  [id number] Admission Summary: This is a 78year old female with a significant past medical history of chronic systolic heart failure due to ischemic cardiomyopathy with implanted left ventricular assist device. Other significant PMH:  type 2 diabetes, hypertension, obesity, COPD, depression invasion Interval history / Subjective:  
Patient is seen and examined. Feels well, no acute events. No fever. ID took new cultures from sternal wound. Assessment & Plan:  
Acute-on-chronic systolic congestive heart failure,, NYHA class IV. Ischemic cardiomyopathy- s/p LVAD 
-Being diuresed with IV Bumex. Continue other cardiac regimen: Carvedilol, Entresto. 
-Daily weight. Strict I&O- Further management by advanced CHF team. 
 
Suspect sternal wound infection: Antibiotics as above. Cardiac surgery consulted Cellulitis of RLE: background of venous stasis and small wound - ID following, cefepime and vancomycin- following cultures NGTD 
 
HTN: chronic, stable, Home regimen, PRN BP meds. Monitor Chronic normocytic anemia. Hemoglobin stable between 89, iron/B12/folate wnl Type 2 diabetes with hyperglycemia: SSI- NPH 20 units twice daily. monitor COPD: stable-Continue maintenance inhaled steroids. Bronchodilators as needed. CKD 3, appears baseline Depression, stable. Continue  Paxil Morbid obesityBody mass index is 46.37 kg/m². Counseled on weight loss #. L Labial hematoma: 2cm ovale bruise, likely hematoma from purewick use. Monitor, f/u op with gynecology if not resolving. Code status: Full code DVT prophylaxis: On warfarin Care Plan discussed with: Patient/Family and Nurse Anticipated Disposition: Home w/Family: Greater than 48 hours Hospital Problems  Date Reviewed: 10/28/2020 Codes Class Noted POA * (Principal) Acute on chronic systolic CHF (congestive heart failure) (HCC) ICD-10-CM: O07.61 ICD-9-CM: 428.23, 428.0  10/27/2020 Yes Review of Systems: A comprehensive review of systems was negative except for that written in the HPI. Vital Signs:  
 Last 24hrs VS reviewed since prior progress note. Most recent are: 
Visit Vitals BP (!) 90/58 (BP 1 Location: Right arm, BP Patient Position: At rest) Pulse 63 Temp 98 °F (36.7 °C) Resp 18 Ht 5' 7\" (1.702 m) Wt 134.3 kg (296 lb 1.2 oz) SpO2 99% BMI 46.37 kg/m² Intake/Output Summary (Last 24 hours) at 10/31/2020 6868 Last data filed at 10/31/2020 0084 Gross per 24 hour Intake 850 ml Output 800 ml Net 50 ml Physical Examination:  
 
Constitutional:  Patient's lying flat in bed without any distress, on room air. Resp: No wheezing/rhonchi/rales. No accessory muscle use Sternal  Small wound site, covered. GI:  Obese. LVAD exit to the epigastric area. Soft, non distended, non tender. normoactive bowel sounds, no hepatosplenomegaly Musculoskeletal:  Purplish erythema on the right lower extremity. Small superficial wound without discharge. Neurologic:  Mental status:AAOx3, Motor exam:Moves all extremities symmetrically Data Review:  
 Review and/or order of clinical lab test 
Review and/or order of tests in the radiology section of CPT Review and/or order of tests in the medicine section of CPT Labs:  
 
Recent Labs 10/31/20 
0510 10/30/20 
1230 WBC 7.1 9.1 HGB 9.5* 9.8* HCT 32.0* 32.6*  
 248 Recent Labs 10/31/20 
0510 10/30/20 
0355 10/29/20 
6313  140 143 K 4.0 4.1 4.2  106 110* CO2 29 28 28 BUN 30* 26* 26* CREA 1.72* 1.59* 1.64* * 189* 169* CA 8.7 8.9 8.0*  
MG 2.1 1.8 1.9 Recent Labs 10/31/20 
0510 10/30/20 
0355 10/29/20 
0800 ALT 7* 7* 7* AP 85 76 79 TBILI 0.4 0.3 0.3 TP 7.5 7.0 6.7 ALB 3.1* 2.9* 2.7*  
GLOB 4.4* 4.1* 4.0 Recent Labs 10/31/20 
0518 10/30/20 
0355 10/29/20 
0706 10/28/20 
1119 INR 2.3* 2.4* 2.6* 2.7* PTP 23.5* 23.7* 25.9* 27.0* APTT  --  47.3* 51.3* 52.1* Recent Labs 10/30/20 
0355 TIBC 301 PSAT 45 Lab Results Component Value Date/Time Folate 10.0 10/28/2020 03:56 AM  
  
No results for input(s): PH, PCO2, PO2 in the last 72 hours. No results for input(s): CPK, CKNDX, TROIQ in the last 72 hours. No lab exists for component: CPKMB Lab Results Component Value Date/Time Cholesterol, total 192 10/01/2020 12:00 AM  
 HDL Cholesterol 29 (L) 10/01/2020 12:00 AM  
 LDL Chol Calc (NIH) 123 (H) 10/01/2020 12:00 AM  
 Triglyceride 222 (H) 10/01/2020 12:00 AM  
 
Lab Results Component Value Date/Time Glucose (POC) 182 (H) 10/31/2020 07:10 AM  
 Glucose (POC) 186 (H) 10/30/2020 09:48 PM  
 Glucose (POC) 152 (H) 10/30/2020 04:40 PM  
 Glucose (POC) 199 (H) 10/30/2020 11:29 AM  
 Glucose (POC) 174 (H) 10/30/2020 07:31 AM  
 
No results found for: COLOR, APPRN, SPGRU, REFSG, GENOVEVA, PROTU, GLUCU, Ethelle Frohlich, BILU, UROU, DAVIE, LEUKU, GLUKE, EPSU, BACTU, WBCU, RBCU, CASTS, UCRY Medications Reviewed:  
 
Current Facility-Administered Medications Medication Dose Route Frequency  warfarin (COUMADIN) tablet 4 mg  4 mg Oral ONCE  
 [START ON 11/1/2020] Vancomycin Random - Please draw level on 11/1 with AM labs, thanks! Other ONCE  
 magnesium oxide (MAG-OX) tablet 400 mg  400 mg Oral BID  bumetanide (BUMEX) tablet 2 mg  2 mg Oral BID  cefepime (MAXIPIME) 2 g in 0.9% sodium chloride (MBP/ADV) 100 mL  2 g IntraVENous Q24H  
 ammonium lactate (LAC-HYDRIN) 12 % lotion   Topical BID  albuterol (PROVENTIL VENTOLIN) nebulizer solution 2.5 mg  2.5 mg Nebulization BID PRN  
 sodium chloride (NS) flush 5-40 mL  5-40 mL IntraVENous Q8H  
 sodium chloride (NS) flush 5-40 mL  5-40 mL IntraVENous PRN  polyethylene glycol (MIRALAX) packet 17 g  17 g Oral DAILY PRN  
 glucose chewable tablet 16 g  4 Tab Oral PRN  
 glucagon (GLUCAGEN) injection 1 mg  1 mg IntraMUSCular PRN  Vancomycin dosing per pharmacy   Other Rx Dosing/Monitoring  amLODIPine (NORVASC) tablet 2.5 mg  2.5 mg Oral DAILY  sodium chloride (NS) flush 5-40 mL  5-40 mL IntraVENous Q8H  
 sodium chloride (NS) flush 5-40 mL  5-40 mL IntraVENous PRN  
 Warfarin - Pharmacy to Dose   Other Rx Dosing/Monitoring  dextrose 10% infusion 0-250 mL  0-250 mL IntraVENous PRN  
 insulin NPH (NOVOLIN N, HUMULIN N) injection 20 Units  0.15 Units/kg SubCUTAneous BID  insulin lispro (HUMALOG) injection   SubCUTAneous AC&HS  aspirin delayed-release tablet 81 mg  81 mg Oral DAILY  arformoterol 15 mcg/budesonide 0.5 mg neb solution   Nebulization BID RT  
 carvediloL (COREG) tablet 25 mg  25 mg Oral BID WITH MEALS  ergocalciferol capsule 50,000 Units  50,000 Units Oral Q7D  
 ferrous sulfate tablet 325 mg  1 Tab Oral DAILY WITH BREAKFAST  gabapentin (NEURONTIN) capsule 300 mg  300 mg Oral DAILY  hydrOXYzine HCL (ATARAX) tablet 10 mg  10 mg Oral TID PRN  
 icosapent ethyL (VASCEPA) capsule 1 Cap  1 Cap Oral BID WITH MEALS  
 PARoxetine (PAXIL) tablet 30 mg  30 mg Oral DAILY  sacubitriL-valsartan (ENTRESTO)  mg tablet 1 Tab  1 Tab Oral BID  tolterodine (DETROL) tablet 8 mg  8 mg Oral BID  hydrALAZINE (APRESOLINE) 20 mg/mL injection 5 mg  5 mg IntraVENous Q4H PRN  
 acetaminophen (TYLENOL) tablet 650 mg  650 mg Oral Q4H PRN Facility-Administered Medications Ordered in Other Encounters Medication Dose Route Frequency  [START ON 11/3/2020] ferumoxytoL (FERAHEME) 510 mg in 0.9% sodium chloride 100 mL, overfill volume 10 mL IVPB  510 mg IntraVENous ONCE  
 [START ON 11/3/2020] cloNIDine HCL (CATAPRES) tablet 0.1 mg  0.1 mg Oral Q4H PRN  
 [START ON 11/3/2020] diphenhydrAMINE (BENADRYL) capsule 25 mg  25 mg Oral ONCE  
 [START ON 11/3/2020] acetaminophen (TYLENOL) tablet 650 mg  650 mg Oral ONCE  
 
______________________________________________________________________ EXPECTED LENGTH OF STAY: - - - 
ACTUAL LENGTH OF STAY:          4 Sterling Levi MD

## 2020-10-31 NOTE — PROGRESS NOTES
ID Progress Note 10/31/2020 Subjective:  
Denies any discomfort at present time. Review of Systems: 
         
Symptom Y/N Comments   Symptom Y/N Comments Fever/Chills n      Chest Pain n      
Poor Appetite       Edema Cough       Abdominal Pain Sputum       Joint Pain SOB/HAIRSTON  n     Pruritis/Rash Nausea/vomit  n     Tolerating PT/OT Diarrhea  n     Tolerating Diet Constipation  n     Other Could NOT obtain due to:    
 
Objective:  
 
Vitals:  
Visit Vitals BP (!) 90/58 (BP 1 Location: Right arm, BP Patient Position: At rest) Pulse 63 Temp 98 °F (36.7 °C) Resp 18 Ht 5' 7\" (1.702 m) Wt 133.5 kg (294 lb 5 oz) SpO2 99% BMI 46.10 kg/m² Tmax:  Temp (24hrs), Av.7 °F (36.5 °C), Min:97.3 °F (36.3 °C), Max:98 °F (36.7 °C) PHYSICAL EXAM: 
General: Morbidly obese. Alert, cooperative, no acute distress EENT:  EOMI. Anicteric sclerae. MMM Resp:  Clear in apex with decreased breath sounds at bases, no wheezing or rales. No accessory muscle use CV:  Regular  rhythm,  trace of pitting edema, VAD hum, Clear drainage from sternal wound; size - base of Q-tip GI:  Soft, Non distended, Non tender. +Bowel sounds Neurologic:  Alert and oriented X 3, normal speech, Psych:   Good insight. Not anxious nor agitated Skin:  bumpy, dusky below knees, right anterior wound packed with nu guaze, diffuse erythema, not tender to touch . No jaundice Labs:  
Lab Results Component Value Date/Time WBC 7.1 10/31/2020 05:10 AM  
 HGB 9.5 (L) 10/31/2020 05:10 AM  
 HCT 32.0 (L) 10/31/2020 05:10 AM  
 PLATELET 706 43/10/5251 05:10 AM  
 MCV 84.9 10/31/2020 05:10 AM  
 
Lab Results Component Value Date/Time  Sodium 141 10/31/2020 05:10 AM  
 Potassium 4.0 10/31/2020 05:10 AM  
 Chloride 105 10/31/2020 05:10 AM  
 CO2 29 10/31/2020 05:10 AM  
 Anion gap 7 10/31/2020 05:10 AM  
 Glucose 172 (H) 10/31/2020 05:10 AM  
 BUN 30 (H) 10/31/2020 05:10 AM  
 Creatinine 1.72 (H) 10/31/2020 05:10 AM  
 BUN/Creatinine ratio 17 10/31/2020 05:10 AM  
 GFR est AA 36 (L) 10/31/2020 05:10 AM  
 GFR est non-AA 29 (L) 10/31/2020 05:10 AM  
 Calcium 8.7 10/31/2020 05:10 AM  
 Bilirubin, total 0.4 10/31/2020 05:10 AM  
 Alk. phosphatase 85 10/31/2020 05:10 AM  
 Protein, total 7.5 10/31/2020 05:10 AM  
 Albumin 3.1 (L) 10/31/2020 05:10 AM  
 Globulin 4.4 (H) 10/31/2020 05:10 AM  
 A-G Ratio 0.7 (L) 10/31/2020 05:10 AM  
 ALT (SGPT) 7 (L) 10/31/2020 05:10 AM  
 
Chest CT (10/27/2020): In the midline inferior to the sternum and expected location of the manubrium 
and apparently socially with skin defect is a subcutaneous oval-shaped density 
measuring between 35 and 54 Hounsfield units. This has well-defined margins 
without adjacent stranding. This could represent high density infected fluid. This could represent granulation tissue this could represent a combination of 
both. This does extend down to the level of the left ventricular assist device 
outflow tract anteriorly. There is no associated gas collection. Slightly more 
superiorly and along the anterior aspect of the outflow tract cannula is a small 
triangular area of soft tissue density measuring 54 Hounsfield units that could 
represent granulation tissue or inflammatory reaction. This is not associated 
with fluid density or air density Assessment and Plan Right lower extremity cellulitis in presence of venous stasis ? Sternal wound infection 
- blood cx (10/27) no growth so far 
  wound cx from sternal and right lower extremity wound ;beta hemolytic strep Continue with IV cefepime IV Vancomycin changed to IV daptomycin due to worsening of creat. Ordered CK level 
  
LVAD/ICD Acute on chronic systolic heart failure - cardiology following EF 15-20% 
  
Type III DM with neuropathy - A1C 8.4. continue with insulin therapy   Continue with neurontin 
  
ROCIO on CKD 
- creat 1.38->1.64->1.59->1.7 continue to monitor   Management per primary care team 
 
 
 
Oly Vasquez, NP

## 2020-10-31 NOTE — PROGRESS NOTES
Pharmacist Note - Vancomycin Dosing Therapy day 4 Indication: RLE cellulitis, possible sternal wound infection 
-LVAD Current regimen: 2000mg every 24 hours A Trough Level resulted at 26.6 mcg/mL which was obtained 22.25 hrs post-dose. The extrapolated \"true\" trough is approximately 25.4 mcg/mL based on the patient's known kinetics. Goal trough: 10 - 15 mcg/mL Plan: Patient received ~1g of the 10/31 AM dose. Due to supratherapeutic level, will hold next dose until a follow-up can be drawn 11/1 AM. Will likely need 2g IV every 36 hours dosing. Pharmacy will continue to monitor this patient daily for changes in clinical status and renal function. Jackie Baca, PharmD, BCPP, BCPS Clinical Pharmacy Specialist, [address] [place], [place] [address]

## 2020-10-31 NOTE — PROGRESS NOTES
0730: Bedside and Verbal shift change report given to Emily Prado, [facility] and Joni Ren RN (oncoming nurse) by Josi Abrams RN (offgoing nurse). Report included the following information SBAR, Kardex, Intake/Output, MAR, Recent Results and Cardiac Rhythm paced. 1930: Bedside and Verbal shift change report given to Josi Abrams RN (oncoming nurse) by Emily Prado RN and Joni Ren RN (offgoing nurse). Report included the following information SBAR, Kardex, Intake/Output, MAR, Accordion, Recent Results and Cardiac Rhythm paced. Faculty or Preceptor Review of RN Work 
 
10/31/2020  - Shift times - 0730 to Gunnar Russell The RN documentation of patient care for Sahil Gave by Joni Ren RN has been reviewed and approved. All medications have been administered under the direct supervision of the faculty or preceptor. All LVAD care and documentation completed by LVAD trained Denna Charnley Herman Lesch, RN 
 
Problem: Diabetes Self-Management Goal: *Disease process and treatment process Description: Define diabetes and identify own type of diabetes; list 3 options for treating diabetes. Outcome: Progressing Towards Goal 
Goal: *Incorporating nutritional management into lifestyle Description: Describe effect of type, amount and timing of food on blood glucose; list 3 methods for planning meals. Outcome: Progressing Towards Goal 
Goal: *Incorporating physical activity into lifestyle Description: State effect of exercise on blood glucose levels. Outcome: Progressing Towards Goal 
Goal: *Developing strategies to promote health/change behavior Description: Define the ABC's of diabetes; identify appropriate screenings, schedule and personal plan for screenings. Outcome: Progressing Towards Goal 
Goal: *Using medications safely Description: State effect of diabetes medications on diabetes; name diabetes medication taking, action and side effects.  
Outcome: Progressing Towards Goal 
Goal: *Monitoring blood glucose, interpreting and using results Description: Identify recommended blood glucose targets  and personal targets. Outcome: Progressing Towards Goal 
Goal: *Prevention, detection, treatment of acute complications Description: List symptoms of hyper- and hypoglycemia; describe how to treat low blood sugar and actions for lowering  high blood glucose level. Outcome: Progressing Towards Goal 
Goal: *Prevention, detection and treatment of chronic complications Description: Define the natural course of diabetes and describe the relationship of blood glucose levels to long term complications of diabetes. Outcome: Progressing Towards Goal 
Goal: *Developing strategies to address psychosocial issues Description: Describe feelings about living with diabetes; identify support needed and support network Outcome: Progressing Towards Goal 
Goal: *Patient Specific Goal (EDIT GOAL, INSERT TEXT) Outcome: Progressing Towards Goal 
  
Problem: Patient Education: Go to Patient Education Activity Goal: Patient/Family Education Outcome: Progressing Towards Goal 
  
Problem: Pressure Injury - Risk of 
Goal: *Prevention of pressure injury Description: Document Jaime Scale and appropriate interventions in the flowsheet. Outcome: Progressing Towards Goal 
Note: Pressure Injury Interventions: 
Sensory Interventions: Keep linens dry and wrinkle-free, Minimize linen layers Moisture Interventions: Absorbent underpads Activity Interventions: Pressure redistribution bed/mattress(bed type) Mobility Interventions: Pressure redistribution bed/mattress (bed type) Nutrition Interventions: Document food/fluid/supplement intake Friction and Shear Interventions: Apply protective barrier, creams and emollients, Foam dressings/transparent film/skin sealants, HOB 30 degrees or less, Minimize layers Problem: Patient Education: Go to Patient Education Activity Goal: Patient/Family Education Outcome: Progressing Towards Goal 
  
Problem: Discharge Planning Goal: *Discharge to safe environment Outcome: Progressing Towards Goal 
  
Problem: Heart Failure: Day 4 Goal: Activity/Safety Outcome: Progressing Towards Goal 
Goal: Diagnostic Test/Procedures Outcome: Progressing Towards Goal 
Goal: Nutrition/Diet Outcome: Progressing Towards Goal 
Goal: Discharge Planning Outcome: Progressing Towards Goal 
Goal: Medications Outcome: Progressing Towards Goal 
Goal: Respiratory Outcome: Progressing Towards Goal 
Goal: Treatments/Interventions/Procedures Outcome: Progressing Towards Goal 
Goal: Psychosocial 
Outcome: Progressing Towards Goal 
Goal: *Oxygen saturation within defined limits Outcome: Progressing Towards Goal 
Goal: *Hemodynamically stable Outcome: Progressing Towards Goal 
Goal: *Optimal pain control at patient's stated goal 
Outcome: Progressing Towards Goal 
Goal: *Anxiety reduced or absent Outcome: Progressing Towards Goal 
Goal: *Demonstrates progressive activity Outcome: Progressing Towards Goal 
  
Problem: Falls - Risk of 
Goal: *Absence of Falls Description: Document Marina Logan Fall Risk and appropriate interventions in the flowsheet. Outcome: Progressing Towards Goal 
Note: Fall Risk Interventions: 
Mobility Interventions: Communicate number of staff needed for ambulation/transfer Medication Interventions: Teach patient to arise slowly Elimination Interventions: Call light in reach, Patient to call for help with toileting needs Problem: Patient Education: Go to Patient Education Activity Goal: Patient/Family Education Outcome: Progressing Towards Goal 
  
Problem: Breathing Pattern - Ineffective Goal: *Absence of hypoxia Outcome: Progressing Towards Goal 
Goal: *Use of effective breathing techniques Outcome: Progressing Towards Goal 
Goal: *PALLIATIVE CARE:  Alleviation of Dyspnea Outcome: Progressing Towards Goal 
  
Problem: Patient Education: Go to Patient Education Activity Goal: Patient/Family Education Outcome: Progressing Towards Goal 
  
Problem: Patient Education: Go to Patient Education Activity Goal: Patient/Family Education Outcome: Progressing Towards Goal

## 2020-10-31 NOTE — PROGRESS NOTES
0730: Bedside shift change report given to [id number] Lisa Phoenix RN (oncoming nurse) by Valentina Guillen (offgoing nurse). Report included the following information SBAR, Kardex, Intake/Output, Recent Results, Med Rec Status and Cardiac Rhythm Ventricular Pacing. 1100: Infectious Disease  NP at bedside. Continue  Cefepim and change from vancomycin to Daptomycin. 1340: York Fent NP at bedside. Plan for repeat echo. Increase Norvasc 2.5mg from once a day to BID.   
 
1400: Pacemaker Interrogation at bedside. Per [employer] device functioning appropriately. 1930: Bedside shift change report given to [address] [place] Lacy (oncoming nurse) by Jenae Chester RN (offgoing nurse). Report included the following information SBAR, Kardex, Intake/Output, MAR, Recent Results, Med Rec Status and Cardiac Rhythm Ventricular Paced.

## 2020-10-31 NOTE — PROGRESS NOTES
Pharmacist Note  Warfarin Dosing Consult provided for this 78 y. o.female to manage warfarin for Atrial Fibrillation, LVAD INR Goal: 2 - 2.5 Home regimen/ tablet size: 4mg qd Drugs that may increase INR: None Drugs that may decrease INR: None Other current anticoagulants/ drugs that may increase bleeding risk: None Risk factors: Age > 65 and Decompensated Heart Failure Daily INR ordered: YES Recent Labs 10/31/20 
0518 10/31/20 
0510 10/30/20 
1230 10/30/20 
0355 10/29/20 
1526 HGB  --  9.5* 9.8*  --   --   
INR 2.3*  --   --  2.4* 2.6* Date               INR                  Dose 
10/26  2.9  4 mg  
10/27  --  --  
10/28  2.7  4 mg  
10/29  2.6  4 mg  
10/30  2.4  4.5 mg 
10/31  2.3  4 mg Assessment/ Plan: Will order warfarin 4 mg PO x 1 dose. Pharmacy will continue to monitor daily and adjust therapy as indicated. Please contact the pharmacist at x [phone] for outpatient recommendations if needed. Kate Jc, PharmD, BCPP, BCPS Clinical Pharmacy Specialist, [facility]

## 2020-10-31 NOTE — PROGRESS NOTES
Problem: Pressure Injury - Risk of 
Goal: *Prevention of pressure injury Description: Document Jaime Scale and appropriate interventions in the flowsheet. Outcome: Progressing Towards Goal 
Note: Pressure Injury Interventions: 
Sensory Interventions: Assess changes in LOC Moisture Interventions: Absorbent underpads Activity Interventions: Increase time out of bed Mobility Interventions: HOB 30 degrees or less Nutrition Interventions: Document food/fluid/supplement intake Friction and Shear Interventions: Apply protective barrier, creams and emollients, Foam dressings/transparent film/skin sealants, HOB 30 degrees or less, Minimize layers

## 2020-10-31 NOTE — PROGRESS NOTES
[facility] VAD/Heart Failure Progress Note Admit Date: 10/27/2020 POD:  * No surgery found * Procedure:      
 
 Subjective:  
Reviewed CT scan; outflow graft looks to be involved in infection; heart TXP really best option Objective:  
Vitals: 
Blood pressure (!) 90/58, pulse 65, temperature 97.8 °F (36.6 °C), resp. rate 16, height 5' 7\" (1.702 m), weight 296 lb 1.2 oz (134.3 kg), SpO2 98 %. Temp (24hrs), Av.7 °F (36.5 °C), Min:97.3 °F (36.3 °C), Max:98.3 °F (36.8 °C) Hemodynamics: 
 CO:   
 CI:   
 CVP:   
 SVR:   
 PAP Systolic:   
 PAP Diastolic:   
 PVR:   
 QT16:   
 SCV02:   
 
VAD Interrogation: LVAD (Heartmate) Pump Speed (RPM): 6.8 Pump Flow (LPM): 9400 PI (Pulsitility Index): 5 Power: 6.7 MAP: 80  Test: No 
Back Up  at Bedside & Labeled: Yes Power Module Test: No 
Driveline Site Care: No 
Driveline Dressing: (no change) EKG/Rhythm:   
 
Extubation Date / Time:  
 
CT Output:  
 
Ventilator: 
  
 
Oxygen Therapy: 
Oxygen Therapy O2 Sat (%): 98 % (10/31/20 0450) Pulse via Oximetry: 63 beats per minute (10/31/20 0724) O2 Device: Room air (10/31/20 07) CXR: 
 
Admission Weight: Last Weight Weight: 288 lb 12.8 oz (131 kg) Weight: 296 lb 1.2 oz (134.3 kg) Intake / Output / Drain: 
Current Shift: No intake/output data recorded. Last 24 hrs.:  
 
Intake/Output Summary (Last 24 hours) at 10/31/2020 0831 Last data filed at 10/31/2020 4619 Gross per 24 hour Intake 850 ml Output 800 ml Net 50 ml No results for input(s): CPK, CKMB, TROIQ in the last 72 hours. Recent Labs 10/31/20 
0510 10/30/20 
1230 10/30/20 
0355 10/29/20 
5814   --  140 143 K 4.0  --  4.1 4.2 CO2 29  --  28 28 BUN 30*  --  26* 26* CREA 1.72*  --  1.59* 1.64* *  --  189* 169* MG 2.1  --  1.8 1.9 WBC 7.1 9.1  --   --   
HGB 9.5* 9.8*  --   --   
HCT 32.0* 32.6*  --   --   
 248  --   --   
 Recent Labs 10/31/20 
0518 10/30/20 
0355 10/29/20 
3657 10/28/20 
1119 INR 2.3* 2.4* 2.6* 2.7* PTP 23.5* 23.7* 25.9* 27.0* APTT  --  47.3* 51.3* 52.1* No lab exists for component: PBNP Current Facility-Administered Medications:  
  warfarin (COUMADIN) tablet 4 mg, 4 mg, Oral, ONCE, Festus Ribeiro MD 
  [START ON 11/1/2020] Vancomycin Random - Please draw level on 11/1 with AM labs, thanks!, , Other, ONCE, Festus Ribeiro MD 
  magnesium oxide (MAG-OX) tablet 400 mg, 400 mg, Oral, BID, Jori Esteban E, NP, 400 mg at 10/30/20 1727   bumetanide (BUMEX) tablet 2 mg, 2 mg, Oral, BID, Jori Esteban NP, 2 mg at 10/30/20 1726   cefepime (MAXIPIME) 2 g in 0.9% sodium chloride (MBP/ADV) 100 mL, 2 g, IntraVENous, Q24H, Puneet Méndez MD, Last Rate: 200 mL/hr at 10/30/20 1513, 2 g at 10/30/20 1513   ammonium lactate (LAC-HYDRIN) 12 % lotion, , Topical, BID, Oly Belle, NP 
  albuterol (PROVENTIL VENTOLIN) nebulizer solution 2.5 mg, 2.5 mg, Nebulization, BID PRN, Disha Hawley MD 
  sodium chloride (NS) flush 5-40 mL, 5-40 mL, IntraVENous, Q8H, Disha Hawley MD, 10 mL at 10/31/20 6861   sodium chloride (NS) flush 5-40 mL, 5-40 mL, IntraVENous, PRN, Disha Hawley MD 
  polyethylene glycol (MIRALAX) packet 17 g, 17 g, Oral, DAILY PRN, Disha Hawley MD 
  glucose chewable tablet 16 g, 4 Tab, Oral, PRN, Disha Hawley MD 
  glucagon (GLUCAGEN) injection 1 mg, 1 mg, IntraMUSCular, PRN, Disha Hawley MD 
  Vancomycin dosing per pharmacy, , Other, Rx Dosing/Monitoring, Disha Hawley MD 
  amLODIPine (NORVASC) tablet 2.5 mg, 2.5 mg, Oral, DAILY, Jori Esteban NP, 2.5 mg at 10/30/20 1372   sodium chloride (NS) flush 5-40 mL, 5-40 mL, IntraVENous, Q8H, Jori Esteban NP, 10 mL at 10/30/20 2149   sodium chloride (NS) flush 5-40 mL, 5-40 mL, IntraVENous, PRN, Jori Esteban NP 
  Warfarin - Pharmacy to Dose, , Other, Rx Dosing/Monitoring, Rosemarie Ward MD 
  dextrose 10% infusion 0-250 mL, 0-250 mL, IntraVENous, PRN, Otilia Blunt NP 
  insulin NPH (NOVOLIN N, HUMULIN N) injection 20 Units, 0.15 Units/kg, SubCUTAneous, BID, Otilia Blunt NP, 20 Units at 10/30/20 1726 
  insulin lispro (HUMALOG) injection, , SubCUTAneous, AC&HS, Otilia Blunt NP, 2 Units at 10/31/20 0730   aspirin delayed-release tablet 81 mg, 81 mg, Oral, DAILY, Disha Hawley MD, 81 mg at 10/30/20 0917 
  arformoterol 15 mcg/budesonide 0.5 mg neb solution, , Nebulization, BID RT, Disha Hawley MD 
  carvediloL (COREG) tablet 25 mg, 25 mg, Oral, BID WITH MEALS, Disha Hawley MD, 25 mg at 10/30/20 1726   ergocalciferol capsule 50,000 Units, 50,000 Units, Oral, Q7D, Disha Hawley MD, 50,000 Units at 10/29/20 1075   ferrous sulfate tablet 325 mg, 1 Tab, Oral, DAILY WITH BREAKFAST, Disha Hawley MD, 325 mg at 10/30/20 0917 
  gabapentin (NEURONTIN) capsule 300 mg, 300 mg, Oral, DAILY, Disha Hawley MD, 300 mg at 10/30/20 8395   hydrOXYzine HCL (ATARAX) tablet 10 mg, 10 mg, Oral, TID PRN, Disha Hawley MD 
  icosapent ethyL (VASCEPA) capsule 1 Cap, 1 Cap, Oral, BID WITH MEALS, Disha Hawley MD 
  PARoxetine (PAXIL) tablet 30 mg, 30 mg, Oral, DAILY, Disha Hawley MD, 30 mg at 10/30/20 0916 
  sacubitriL-valsartan (ENTRESTO)  mg tablet 1 Tab, 1 Tab, Oral, BID, Disha Hawley MD, 1 Tab at 10/30/20 1726   tolterodine (DETROL) tablet 8 mg, 8 mg, Oral, BID, Disha Hawley MD 
  hydrALAZINE (APRESOLINE) 20 mg/mL injection 5 mg, 5 mg, IntraVENous, Q4H PRN, Disha Hawley MD 
  acetaminophen (TYLENOL) tablet 650 mg, 650 mg, Oral, Q4H PRN, Disha Hawley MD 
 
Facility-Administered Medications Ordered in Other Encounters:  
  [START ON 11/3/2020] ferumoxytoL (FERAHEME) 510 mg in 0.9% sodium chloride 100 mL, overfill volume 10 mL IVPB, 510 mg, IntraVENous, ONCE, Lois Delvalle, Leanna Patel MD 
[address] [place] [address]  Devonte Logan ON 11/3/2020] cloNIDine HCL (CATAPRES) tablet 0.1 mg, 0.1 mg, Oral, Q4H PRN, Deanna Valdes MD 
[address] [place] [address]  [START ON 11/3/2020] diphenhydrAMINE (BENADRYL) capsule 25 mg, 25 mg, Oral, ONCE, Deanna Valdes MD 
[address] [place] [address]  [START ON 11/3/2020] acetaminophen (TYLENOL) tablet 650 mg, 650 mg, Oral, ONCE, Deanna Valdes MD 
 
A/P 
  
S/P LVAD - good flows Need for anticoagulation - coumadin Depression - home meds Chronic wound infection - Abx's  
  
Risk of morbidity and mortality - high Medical decision making - high complexity 
  
Signed By: Carolina Cosby MD

## 2020-11-01 NOTE — PROGRESS NOTES
0630  MAP has been 98 all night. Pt is declining prn Hydralazine. Pt states her MAPs tend to run high when she is on antibiotics and she would prefer not to take anything for it.

## 2020-11-01 NOTE — PROGRESS NOTES
[address] Heart Failure Inpatient Progress Note Patient name: Brisa Vasquez Patient : 1950 Patient MRN: [id number] Date of service: 20 CHIEF COMPLAINT: 
Cellulitis 
  
PLAN: 
Continue current device speed 9400rpm; increase speed once MAPs under control TTE today due to elevated flows (>8lpm) Continue current medical therapy for heart failure Continue current dose of coreg 25mg twice daily Continue Entresto 97/103 mg PO BID Continue Norvasc 2.5 mg PO BID No spironolactone due to hyperkalemia Decrease Bumex to 2 mg PO daily Magnesium oxide 400mg daily, check Mg with labs Patient states she has reaction to allopurinol (leg edema), will hold Continue synthroid, TSH & Free T4 at goal 
vitamin D,  high dose weekly x 12 weeks Chronic anticoagulation with coumadin, dosage per pharmacy (goal 2-2.5) Continue baby ASA Patient not taking statin, retrieve medical records for reason Continue CPAP for JED Antibiotics per ID consult/primary team, wound cx with scant GNRs Sternal wound infection per CTS; continue medical management for now DTC consult recs appreciated, HgbA1C -8.4% NPH and SSI per DTC recs Ambulate daily/PT/OT Plan on [address] early next week prior to discharge Nutrition consult ordered Note: Needs multiple referrals prior to hospital discharge: GYN and urogynecologist, pulmonologist, EP cardiologist; also patient needs flu shot and [facility] 
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 Rufino Gomes at [facility]) · C/b delayed extubation due to severe COPD 
· C/b critical illness polyneuropathy · C/b prolonged hospitalization post-LVAD, 55 days · C/b sternal wound infection s/p debridement (by Dr. Rufino Gomes) s/p wound vac · C/b sacral ulcer · Would culture positive for Staph aureus, not MRSA · C/b LVAD site drainage, improved S/p CRT-ICD · ICD fired due to electrolyte imbalance () H/o breast cancer () · s/p bilateral mastectomy/chemo and endometrial cancer s/p hysterectomy · Lymphedema of LLE due to cancer treatment Severe COPD with FEV1 50% Depression Atrial fibrillation H/o \"two mini strokes\" S/p fall with hip facture · Right hip hemmiarthroplasty (18) by Dr. Andres Huber) · S/p removed hip hardwarare due to pain (4/15/19) COPD severe CKD, stage 3 Hyperkalemia Pulmonary hypertension Cardiac risk factors: · Morbid obesity, Body mass index is 45.75 kg/m². · DM2 insulin dependent · JED on CPAP 
· HL Urinary incontinence, severe · no procedures due to anticoagulation · conservative management with Detrol 4 pills bid Endometrial cancer () HTN 
HL 
  
CARDIAC IMAGING: 
Echo (19) LVEF 20-25%, AV opens 1:1, no AR Echo (18) LVEF 10-%, ramps study done, report in [facility] Echo (18) ramp study done, LVEDD 7.1cm LHC (18) 2 vessel disease with 90% OM, 80% PDA, DSA to PDA branch 
TTE (10/27/20) LVEF 15-20% LVIDd 7.26cm RVIDd 2.82cm Tapse 1.14cm 
  
HEMODYNAMICS: 
RHC not done CPEST not done 6MW not done 
  
OTHER IMAGING: 
EGD/C-scope (19) no active bleeding and polyp removed. 
  
FAMILY HISTORY: 
Mother  76 years, diagnosed with DM, HTN, CAD Father  [de-identified]years old, HTN, CAD, MI 
Positive for DM and heart disease in the family, brother with valve replacement 
  
SOCIAL HISTORY: 
Never alcohol, never smoked, , lives alone, no illicit drug use, lives in house, ambulatory status indpendedn, children: daughter, occupation retired Lives alone. 
  
ALLERGIES: benzodiazepines and quetiapine fumerate (lethargy, resp failure to both) 
  
HISTORY OF PRESENT ILLNESS: 
Briefly, Kai Ross is a 78 y.o. female with multiple comorbidities listed above presents to our clinic requesting transfer of care from [facility] LVAD program. 
  
REVIEW OF SYSTEMS: 
General: Denies fever Ear, nose and throat: Denies difficulty hearing, sinus problems, runny nose, post-nasal drip, ringing in ears, mouth sores, loose teeth, ear pain, nosebleeds, sore throate, facial pain or numbess Cardiovascular: see above in the interval history Respiratory: denies cough, wheezing, sputum production, or hemoptysis. Gastrointestinal: Denies heartburn, constipation, intolerance to certain foods, diarrhea, abdominal pain, nausea, vomiting, difficulty swallowing, blood in stool Kidney and bladder: Denies painful urination, frequent urination, or urgency Musculoskeletal: Denies joint pain, muscle weakness Skin and hair: Denies change in hair loss or increase, breast changes Redness and edema, skin breakdown RLE. PHYSICAL EXAM: 
Visit Vitals BP (!) 90/58 (BP 1 Location: Right arm, BP Patient Position: At rest) Pulse 64 Temp 98.1 °F (36.7 °C) Resp 18 Ht 5' 7\" (1.702 m) Wt 292 lb 1.8 oz (132.5 kg) SpO2 96% BMI 45.75 kg/m² LVAD Pump Speed (RPM): 9400 Pump Flow (LPM): 4.7 MAP: 72 
PI (Pulsitility Index): 5.9 Power: 5.4  Test: No 
Back Up  at Bedside & Labeled: Yes Power Module Test: No 
Driveline Site Care: No 
Driveline Dressing: Clean Outpatient: No 
MAP in Therapeutic Range (Outpatient): Yes Testing Alarms Reviewed: Yes 
Back up SC speed: 9400 Back up Low Speed Limit: 9000 Emergency Equipment with Patient?: Yes Emergency procedures reviewed?: Yes Drive line site inspected?: No 
Drive line intergrity inspected?: Yes 
Drive line dressing changed?: No 
 
 
General: Patient is morbidly obese in no acute distress, sitting up in chair HEENT: Normocephalic and atraumatic. No scleral icterus. Pupils are equal, round and reactive to light and accomodation. No conjunctival injection. Oropharynx is clear. Neck: Supple. No evidence of thyroid enlargements or lymphadenopathy. JVD: Cannot be appreciated Lungs: Breath sounds are equal and clear bilaterally. No wheezes, rhonchi, or rales. Heart: VAD hum Abdomen: Soft, obese, no mass or tenderness. No organomegaly or hernia. Bowel sounds present. Genitourinary and rectal: deferred Extremities: No cyanosis, clubbing, Redness and edema, skin breakdown RLE; BLE edema 3+, venous stasis Neurologic: No focal sensory or motor deficits are noted. Grossly intact. Psychiatric: Awake, alert and oriented x 3. Appropriate mood and affect. Skin: Warm, dry, no nodules + petechiae on extremities PAST MEDICAL HISTORY: 
Past Medical History:  
Diagnosis Date  Asthma  Cancer ([facility] [address] breast  
 Cancer ([facility] [address]   
 endometrial  
 Congestive heart failure, unspecified  CRI (chronic renal insufficiency)  Depression  Diabetes ([facility] [address]  Hypertension PAST SURGICAL HISTORY: 
Past Surgical History:  
Procedure Laterality Date  HX HERNIA REPAIR    
 HX HYSTERECTOMY  HX MASTECTOMY FAMILY HISTORY: 
History reviewed. No pertinent family history. SOCIAL HISTORY: 
Social History Socioeconomic History  Marital status:  Spouse name: Not on file  Number of children: Not on file  Years of education: Not on file  Highest education level: Not on file Tobacco Use  Smoking status: Never Smoker  Smokeless tobacco: Never Used Substance and Sexual Activity  Alcohol use: Not Currently LABORATORY RESULTS: 
Labs Latest Ref Rng & Units 11/1/2020 10/31/2020 10/30/2020 10/29/2020 10/28/2020 10/27/2020 10/1/2020 WBC 3.6 - 11.0 K/uL 6.9 7.1 9.1 - 7.0 8.5 7.2 RBC 3.80 - 5.20 M/uL 3.77(L) 3.77(L) 3.90 - 3.57(L) 3.81 3.75(L) Hemoglobin 11.5 - 16.0 g/dL 9.5(L) 9.5(L) 9.8(L) - 8. 8(L) 9.5(L) 9.3(L) Hematocrit 35.0 - 47.0 % 32. 1(L) 32. 0(L) 32. 6(L) - 30. 2(L) 32. 5(L) 29. 7(L) MCV 80.0 - 99.0 FL 85.1 84.9 83.6 - 84.6 85.3 79 Platelets 450 - 843 K/uL 196 202 248 - 189 218 225 Lymphocytes 12 - 49 % - - - - 15 17 - Monocytes 5 - 13 % - - - - 5 5 - Eosinophils 0 - 7 % - - - - 3 3 - Basophils 0 - 1 % - - - - 0 1 - Albumin 3.5 - 5.0 g/dL 3.2(L) 3. 1(L) 2. 9(L) 2. 7(L) 2. 9(L) 3. 3(L) 3.8 Calcium 8.5 - 10.1 MG/DL 9.1 8.7 8.9 8. 0(L) 8.5 8.7 9.0 Glucose 65 - 100 mg/dL 148(H) 172(H) 189(H) 169(H) 245(H) 192(H) 182(H) BUN 6 - 20 MG/DL 35(H) 30(H) 26(H) 26(H) 24(H) 28(H) 27 Creatinine 0.55 - 1.02 MG/DL 1.82(H) 1.72(H) 1.59(H) 1.64(H) 1.38(H) 1.60(H) 1.32(H) Sodium 136 - 145 mmol/L 138 141 140 143 142 140 140 Potassium 3.5 - 5.1 mmol/L 3.8 4.0 4.1 4.2 4.1 4.2 5.4(H) TSH 0.450 - 4.500 uIU/mL - - - - - - 2.980 LDH 81 - 246 U/L 319(H) 316(H) 330(H) 309(H) - 324(H) 227(H) Some recent data might be hidden ALLERGY: 
No Known Allergies CURRENT MEDICATIONS: 
 
Current Facility-Administered Medications:  
  warfarin (COUMADIN) tablet 4 mg, 4 mg, Oral, ONCE, Festus Ribeiro MD 
  DAPTOmycin (CUBICIN) 550 mg in 0.9% sodium chloride 50 mL IVPB RF formulation, 4 mg/kg, IntraVENous, Q24H, Oly Belle NP, Last Rate: 100 mL/hr at 10/31/20 1309, 550 mg at 10/31/20 1309 
  amLODIPine (NORVASC) tablet 2.5 mg, 2.5 mg, Oral, BID, Polliard, Cyndra Sours T, NP, 2.5 mg at 11/01/20 0919 
  magnesium oxide (MAG-OX) tablet 400 mg, 400 mg, Oral, BID, Myrla Vale E, NP, 400 mg at 11/01/20 2957   bumetanide (BUMEX) tablet 2 mg, 2 mg, Oral, BID, Myrla Vale, NP, 2 mg at 11/01/20 3158   cefepime (MAXIPIME) 2 g in 0.9% sodium chloride (MBP/ADV) 100 mL, 2 g, IntraVENous, Q24H, Puneet Méndez MD, Last Rate: 200 mL/hr at 10/31/20 1420, 2 g at 10/31/20 1420   ammonium lactate (LAC-HYDRIN) 12 % lotion, , Topical, BID, Oly Belle, TEA 
  albuterol (PROVENTIL VENTOLIN) nebulizer solution 2.5 mg, 2.5 mg, Nebulization, BID PRN, Disha Hawley MD 
  sodium chloride (NS) flush 5-40 mL, 5-40 mL, IntraVENous, Q8H, Disha Hawley MD, 10 mL at 11/01/20 0627 
  sodium chloride (NS) flush 5-40 mL, 5-40 mL, IntraVENous, PRN, Disha Hawley MD 
  polyethylene glycol (MIRALAX) packet 17 g, 17 g, Oral, DAILY PRN, Disha Hawley MD 
  glucose chewable tablet 16 g, 4 Tab, Oral, PRN, Disha Hawley MD 
  glucagon (GLUCAGEN) injection 1 mg, 1 mg, IntraMUSCular, PRN, Disha Hawley MD 
  sodium chloride (NS) flush 5-40 mL, 5-40 mL, IntraVENous, Q8H, Ivan OWENS, TEA, 10 mL at 10/31/20 4827   sodium chloride (NS) flush 5-40 mL, 5-40 mL, IntraVENous, PRN, Ivan Solorzano NP 
  Warfarin - Pharmacy to Dose, , Other, Rx Dosing/Monitoring, Юлия Ward MD 
  dextrose 10% infusion 0-250 mL, 0-250 mL, IntraVENous, PRN, Ivan Solorzano NP 
  insulin NPH (NOVOLIN N, HUMULIN N) injection 20 Units, 0.15 Units/kg, SubCUTAneous, BID, Ivan Solorzano NP, 20 Units at 11/01/20 0919 
  insulin lispro (HUMALOG) injection, , SubCUTAneous, AC&HS, Ivan Solorzano NP, 2 Units at 11/01/20 0630   aspirin delayed-release tablet 81 mg, 81 mg, Oral, DAILY, Disha Hawley MD, 81 mg at 11/01/20 0920 
  arformoterol 15 mcg/budesonide 0.5 mg neb solution, , Nebulization, BID RT, Disha Hawley MD 
  carvediloL (COREG) tablet 25 mg, 25 mg, Oral, BID WITH MEALS, Disha Hawley MD, 25 mg at 11/01/20 6988   ergocalciferol capsule 50,000 Units, 50,000 Units, Oral, Q7D, Disha Hawley MD, 50,000 Units at 10/29/20 9340   ferrous sulfate tablet 325 mg, 1 Tab, Oral, DAILY WITH BREAKFAST, Disha Hawley MD, 325 mg at 11/01/20 0920 
  gabapentin (NEURONTIN) capsule 300 mg, 300 mg, Oral, DAILY, Disha Hawley MD, 300 mg at 11/01/20 0920   hydrOXYzine HCL (ATARAX) tablet 10 mg, 10 mg, Oral, TID PRN, Disha Hawley MD 
  icosapent ethyL (VASCEPA) capsule 1 Cap, 1 Cap, Oral, BID WITH MEALS, Disha Hawley MD, Stopped at 10/31/20 0800   PARoxetine (PAXIL) tablet 30 mg, 30 mg, Oral, DAILY, Disha Hawley MD, 30 mg at 11/01/20 0919 
  sacubitriL-valsartan (ENTRESTO)  mg tablet 1 Tab, 1 Tab, Oral, BID, Michaela ENCARNACION MD, 1 Tab at 11/01/20 4259   tolterodine (DETROL) tablet 8 mg, 8 mg, Oral, BID, Disha Hawley MD, Stopped at 10/31/20 0900   hydrALAZINE (APRESOLINE) 20 mg/mL injection 5 mg, 5 mg, IntraVENous, Q4H PRN, Disha Hawley MD 
  acetaminophen (TYLENOL) tablet 650 mg, 650 mg, Oral, Q4H PRN, Disha Hawley MD 
 
Facility-Administered Medications Ordered in Other Encounters:  
  [START ON 11/3/2020] ferumoxytoL (FERAHEME) 510 mg in 0.9% sodium chloride 100 mL, overfill volume 10 mL IVPB, 510 mg, IntraVENous, ONCE, MD Azul Arreguin  [START ON 11/3/2020] cloNIDine HCL (CATAPRES) tablet 0.1 mg, 0.1 mg, Oral, Q4H PRN, MD Azul Arreguin  [START ON 11/3/2020] diphenhydrAMINE (BENADRYL) capsule 25 mg, 25 mg, Oral, ONCE, MD Azul Arreguin  [START ON 11/3/2020] acetaminophen (TYLENOL) tablet 650 mg, 650 mg, Oral, ONCE, Kenyatta Lal MD 
 
Thank you for your referral and allowing me to participate in this patient's care. TEA Jin [id number] [place] [address] Street, [address] Phone: [phone] Fax: [phone] PATIENT CARE TEAM: 
Patient Care Team: 
Suni Perkins NP as PCP - General (Nurse Practitioner)

## 2020-11-01 NOTE — PROGRESS NOTES
[facility] VAD/Heart Failure Progress Note Admit Date: 10/27/2020 POD:  * No surgery found * Procedure:      
 
 Subjective:  
Feels well; setting up for RHC Objective:  
Vitals: 
Blood pressure (!) 90/58, pulse 66, temperature 97.7 °F (36.5 °C), resp. rate 18, height 5' 7\" (1.702 m), weight 292 lb 1.8 oz (132.5 kg), SpO2 96 %. Temp (24hrs), Av.1 °F (36.7 °C), Min:97.7 °F (36.5 °C), Max:98.5 °F (36.9 °C) Hemodynamics: 
 CO:   
 CI:   
 CVP:   
 SVR:   
 PAP Systolic:   
 PAP Diastolic:   
 PVR:   
 OW91:   
 SCV02:   
 
VAD Interrogation: LVAD (Heartmate) Pump Speed (RPM): 9400 Pump Flow (LPM): 4.6 PI (Pulsitility Index): 5.1 Power: 5.5 MAP: 78  Test: No 
Back Up  at Bedside & Labeled: Yes Power Module Test: No 
Driveline Site Care: No 
Driveline Dressing: Clean EKG/Rhythm:   
 
Extubation Date / Time:  
 
CT Output:  
 
Ventilator: 
  
 
Oxygen Therapy: 
Oxygen Therapy O2 Sat (%): 96 % (20 1546) Pulse via Oximetry: 63 beats per minute (10/31/20 0724) O2 Device: Room air (20 1546) CXR: 
 
Admission Weight: Last Weight Weight: 288 lb 12.8 oz (131 kg) Weight: 292 lb 1.8 oz (132.5 kg) Intake / Mitesh Ode / Drain: 
Current Shift: 701 -  190 In: 240 [P.O.:240] Out: - Last 24 hrs.:  
 
Intake/Output Summary (Last 24 hours) at 2020 1710 Last data filed at 2020 6976 Gross per 24 hour Intake 570 ml Output 1450 ml Net -880 ml Recent Labs 10/31/20 
4196 CPK 63 Recent Labs 20 
0330 10/31/20 
0510 10/30/20 
1230 10/30/20 
0355  141  --  140  
K 3.8 4.0  --  4.1 CO2 31 29  --  28 BUN 35* 30*  --  26* CREA 1.82* 1.72*  --  1.59* * 172*  --  189* MG 2.0 2.1  --  1.8 WBC 6.9 7.1 9.1  --   
HGB 9.5* 9.5* 9.8*  --   
HCT 32.1* 32.0* 32.6*  --   
 202 248  --   
 
Recent Labs 20 
7803 10/31/20 
0518 10/30/20 
0355 INR 2.4* 2. 3* 2.4* PTP 24.0* 23.5* 23.7* APTT 48.8*  --  47.3* No lab exists for component: PBNP Current Facility-Administered Medications:  
  [START ON 11/2/2020] bumetanide (BUMEX) tablet 2 mg, 2 mg, Oral, DAILY, Sofia Walter NP 
  DAPTOmycin (CUBICIN) 550 mg in 0.9% sodium chloride 50 mL IVPB RF formulation, 4 mg/kg, IntraVENous, Q24H, Oly Belle NP, Last Rate: 100 mL/hr at 11/01/20 1635, 550 mg at 11/01/20 1635 
  amLODIPine (NORVASC) tablet 2.5 mg, 2.5 mg, Oral, BID, Sterling Walter NP, 2.5 mg at 11/01/20 0919 
  magnesium oxide (MAG-OX) tablet 400 mg, 400 mg, Oral, BID, Wood OWENS NP, 400 mg at 11/01/20 1536   cefepime (MAXIPIME) 2 g in 0.9% sodium chloride (MBP/ADV) 100 mL, 2 g, IntraVENous, Q24H, Puneet Méndez MD, Last Rate: 200 mL/hr at 10/31/20 1420, 2 g at 10/31/20 1420   ammonium lactate (LAC-HYDRIN) 12 % lotion, , Topical, BID, Oly Belle NP, Stopped at 11/01/20 0900 
  albuterol (PROVENTIL VENTOLIN) nebulizer solution 2.5 mg, 2.5 mg, Nebulization, BID PRN, Disha Hawley MD 
  sodium chloride (NS) flush 5-40 mL, 5-40 mL, IntraVENous, Q8H, Dsiha Hawley MD, 10 mL at 11/01/20 1639 
  sodium chloride (NS) flush 5-40 mL, 5-40 mL, IntraVENous, PRN, Disha Hawley MD 
  polyethylene glycol (MIRALAX) packet 17 g, 17 g, Oral, DAILY PRN, Disha Hawley MD 
  glucose chewable tablet 16 g, 4 Tab, Oral, PRN, Disha Hawley MD 
  glucagon (GLUCAGEN) injection 1 mg, 1 mg, IntraMUSCular, PRN, Disha Hawley MD 
  sodium chloride (NS) flush 5-40 mL, 5-40 mL, IntraVENous, Q8H, Wood OWENS NP, 10 mL at 11/01/20 1639 
  sodium chloride (NS) flush 5-40 mL, 5-40 mL, IntraVENous, PRN, Wood Fregoso NP 
  Warfarin - Pharmacy to Dose, , Other, Rx Dosing/Monitoring, Jayla Ward MD 
  dextrose 10% infusion 0-250 mL, 0-250 mL, IntraVENous, PRN, Wood Fregoso NP 
  insulin NPH (NOVOLIN N, HUMULIN N) injection 20 Units, 0.15 Units/kg, SubCUTAneous, BID, Sally Torres NP, 20 Units at 11/01/20 0919 
  insulin lispro (HUMALOG) injection, , SubCUTAneous, AC&HS, Sally Torres NP, 3 Units at 11/01/20 1650   aspirin delayed-release tablet 81 mg, 81 mg, Oral, DAILY, Disha Hawley MD, 81 mg at 11/01/20 0920 
  arformoterol 15 mcg/budesonide 0.5 mg neb solution, , Nebulization, BID RT, Disha Hawley MD 
  carvediloL (COREG) tablet 25 mg, 25 mg, Oral, BID WITH MEALS, Disha Hawley MD, 25 mg at 11/01/20 1650   ergocalciferol capsule 50,000 Units, 50,000 Units, Oral, Q7D, Disha Hawley MD, 50,000 Units at 10/29/20 7129   ferrous sulfate tablet 325 mg, 1 Tab, Oral, DAILY WITH BREAKFAST, Disha Hawley MD, 325 mg at 11/01/20 0920 
  gabapentin (NEURONTIN) capsule 300 mg, 300 mg, Oral, DAILY, Disha Hawley MD, 300 mg at 11/01/20 0920   hydrOXYzine HCL (ATARAX) tablet 10 mg, 10 mg, Oral, TID PRN, Disha Hawley MD 
  icosapent ethyL (VASCEPA) capsule 1 Cap, 1 Cap, Oral, BID WITH MEALS, Disha Hawley MD, Stopped at 10/31/20 0800   PARoxetine (PAXIL) tablet 30 mg, 30 mg, Oral, DAILY, Disha Hawley MD, 30 mg at 11/01/20 0919 
  sacubitriL-valsartan (ENTRESTO)  mg tablet 1 Tab, 1 Tab, Oral, BID, Bull ENCARNACION MD, 1 Tab at 11/01/20 3682   tolterodine (DETROL) tablet 8 mg, 8 mg, Oral, BID, Disha Hawley MD, Stopped at 10/31/20 0900   hydrALAZINE (APRESOLINE) 20 mg/mL injection 5 mg, 5 mg, IntraVENous, Q4H PRN, Disha Hawley MD 
  acetaminophen (TYLENOL) tablet 650 mg, 650 mg, Oral, Q4H PRN, Disha Hawley MD 
 
Facility-Administered Medications Ordered in Other Encounters:  
  [START ON 11/3/2020] ferumoxytoL (FERAHEME) 510 mg in 0.9% sodium chloride 100 mL, overfill volume 10 mL IVPB, 510 mg, IntraVENous, ONCE, Arnva Hough MD 
[address] [place] [address]  [START ON 11/3/2020] cloNIDine HCL (CATAPRES) tablet 0.1 mg, 0.1 mg, Oral, Q4H PRN, Arnav Hough MD 
  [START ON 11/3/2020] diphenhydrAMINE (BENADRYL) capsule 25 mg, 25 mg, Oral, ONCE, Sesar Richards MD 
  [START ON 11/3/2020] acetaminophen (TYLENOL) tablet 650 mg, 650 mg, Oral, ONCE, Sesar Richards MD 
 
A/P 
  
S/P LVAD - good flows Need for anticoagulation - coumadin Depression - home meds Chronic wound infection - Abx's  
  
Risk of morbidity and mortality - high Medical decision making - high complexity Signed By: Lorena Haider MD

## 2020-11-01 NOTE — PROGRESS NOTES
[facility] Hospitalist Progress Note Dragan Strange MD 
Answering service: [phone] OR [phone] from in house phone Date of Service:  2020 NAME:  Bienvenido Booker :  1950 MRN:  [id number] Admission Summary: This is a 78year old female with a significant past medical history of chronic systolic heart failure due to ischemic cardiomyopathy with implanted left ventricular assist device. Other significant PMH:  type 2 diabetes, hypertension, obesity, COPD, depression invasion Interval history / Subjective:  
Patient is seen and examined. Feels well in herself, no acute events overnight. Cardio plan for RHC next days Assessment & Plan:  
Acute-on-chronic systolic congestive heart failure,, NYHA class IV. Ischemic cardiomyopathy- s/p LVAD 
- Being diuresed with IV Bumex. Continue other cardiac regimen: Carvedilol, Entresto. - Daily weight. Strict I&O- Further management by advanced CHF team. 
- plans for RHC ? Monday Suspect sternal wound infection: Antibiotics as above. Cardiac surgery consulted Cellulitis of RLE: background of venous stasis and small wound - ID following, cefepime and vancomycin 'switched to Dapto due to Cr rise 
- following cultures NGTD, ID took more cultures from sternum on 10/31 NGTD 
 
HTN: chronic, stable, Home regimen, PRN BP meds. Monitor Chronic normocytic anemia. Hemoglobin stable between 89, iron/B12/folate wnl Type 2 diabetes with hyperglycemia: SSI- NPH 20 units twice daily. monitor COPD: stable-Continue maintenance inhaled steroids. Bronchodilators as needed. CKD 3, appears baseline Depression, stable. Continue  Paxil Morbid obesityBody mass index is 45.75 kg/m². Counseled on weight loss #. L Labial hematoma: 2cm ovale bruise, likely hematoma from purewick use. Monitor, f/u op with gynecology if not resolving. Code status: Full code DVT prophylaxis: On warfarin Care Plan discussed with: Patient/Family and Nurse Anticipated Disposition: Home w/Family: Greater than 48 hours Hospital Problems  Date Reviewed: 10/28/2020 Codes Class Noted POA * (Principal) Acute on chronic systolic CHF (congestive heart failure) (HCC) ICD-10-CM: D35.01 ICD-9-CM: 428.23, 428.0  10/27/2020 Yes Review of Systems: A comprehensive review of systems was negative except for that written in the HPI. Vital Signs:  
 Last 24hrs VS reviewed since prior progress note. Most recent are: 
Visit Vitals BP (!) 90/58 (BP 1 Location: Right arm, BP Patient Position: At rest) Pulse 68 Temp 98.2 °F (36.8 °C) Resp 17 Ht 5' 7\" (1.702 m) Wt 132.5 kg (292 lb 1.8 oz) SpO2 99% BMI 45.75 kg/m² Intake/Output Summary (Last 24 hours) at 11/1/2020 9405 Last data filed at 11/1/2020 4671 Gross per 24 hour Intake 1390 ml Output 2625 ml Net -1235 ml Physical Examination:  
 
Constitutional:  Patient's lying flat in bed without any distress, on room air. Resp: No wheezing/rhonchi/rales. No accessory muscle use Sternal  Small wound site, covered. GI:  Obese. LVAD exit to the epigastric area. Soft, non distended, non tender. normoactive bowel sounds, no hepatosplenomegaly Musculoskeletal:  Purplish erythema on the right lower extremity. Small superficial wound without discharge. Neurologic:  Mental status:AAOx3, Motor exam:Moves all extremities symmetrically Data Review:  
 Review and/or order of clinical lab test 
Review and/or order of tests in the radiology section of CPT Review and/or order of tests in the medicine section of CPT Labs:  
 
Recent Labs 11/01/20 
0330 10/31/20 
0510 WBC 6.9 7.1 HGB 9.5* 9.5* HCT 32.1* 32.0*  
 202 Recent Labs 11/01/20 
0330 10/31/20 
0510 10/30/20 
0355  141 140  
K 3.8 4.0 4.1  105 106 CO2 31 29 28 BUN 35* 30* 26* CREA 1.82* 1.72* 1.59* * 172* 189* CA 9.1 8.7 8.9 MG 2.0 2.1 1.8 Recent Labs 11/01/20 
0330 10/31/20 
0510 10/30/20 
0355 ALT 8* 7* 7* AP 77 85 76 TBILI 0.4 0.4 0.3 TP 7.6 7.5 7.0 ALB 3.2* 3.1* 2.9*  
GLOB 4.4* 4.4* 4.1* Recent Labs 11/01/20 
0345 10/31/20 
0518 10/30/20 
0355 INR 2.4* 2.3* 2.4* PTP 24.0* 23.5* 23.7* APTT 48.8*  --  47.3* Recent Labs 10/30/20 
0355 TIBC 301 PSAT 45 Lab Results Component Value Date/Time Folate 10.0 10/28/2020 03:56 AM  
  
No results for input(s): PH, PCO2, PO2 in the last 72 hours. Recent Labs 10/31/20 
3115 CPK 63 Lab Results Component Value Date/Time Cholesterol, total 192 10/01/2020 12:00 AM  
 HDL Cholesterol 29 (L) 10/01/2020 12:00 AM  
 LDL Chol Calc (NIH) 123 (H) 10/01/2020 12:00 AM  
 Triglyceride 222 (H) 10/01/2020 12:00 AM  
 
Lab Results Component Value Date/Time Glucose (POC) 163 (H) 11/01/2020 06:09 AM  
 Glucose (POC) 165 (H) 10/31/2020 08:33 PM  
 Glucose (POC) 186 (H) 10/31/2020 04:15 PM  
 Glucose (POC) 166 (H) 10/31/2020 12:29 PM  
 Glucose (POC) 182 (H) 10/31/2020 07:10 AM  
 
No results found for: COLOR, APPRN, SPGRU, REFSG, GENOVEVA, PROTU, GLUCU, [place], BILU, UROU, DAVIE, LEUKU, GLUKE, EPSU, BACTU, WBCU, RBCU, CASTS, UCRY Medications Reviewed:  
 
Current Facility-Administered Medications Medication Dose Route Frequency  warfarin (COUMADIN) tablet 4 mg  4 mg Oral ONCE  
 DAPTOmycin (CUBICIN) 550 mg in 0.9% sodium chloride 50 mL IVPB RF formulation  4 mg/kg IntraVENous Q24H  
 amLODIPine (NORVASC) tablet 2.5 mg  2.5 mg Oral BID  magnesium oxide (MAG-OX) tablet 400 mg  400 mg Oral BID  bumetanide (BUMEX) tablet 2 mg  2 mg Oral BID  cefepime (MAXIPIME) 2 g in 0.9% sodium chloride (MBP/ADV) 100 mL  2 g IntraVENous Q24H  
 ammonium lactate (LAC-HYDRIN) 12 % lotion   Topical BID  albuterol (PROVENTIL VENTOLIN) nebulizer solution 2.5 mg  2.5 mg Nebulization BID PRN  
 sodium chloride (NS) flush 5-40 mL  5-40 mL IntraVENous Q8H  
 sodium chloride (NS) flush 5-40 mL  5-40 mL IntraVENous PRN  polyethylene glycol (MIRALAX) packet 17 g  17 g Oral DAILY PRN  
 glucose chewable tablet 16 g  4 Tab Oral PRN  
 glucagon (GLUCAGEN) injection 1 mg  1 mg IntraMUSCular PRN  
 sodium chloride (NS) flush 5-40 mL  5-40 mL IntraVENous Q8H  
 sodium chloride (NS) flush 5-40 mL  5-40 mL IntraVENous PRN  
 Warfarin - Pharmacy to Dose   Other Rx Dosing/Monitoring  dextrose 10% infusion 0-250 mL  0-250 mL IntraVENous PRN  
 insulin NPH (NOVOLIN N, HUMULIN N) injection 20 Units  0.15 Units/kg SubCUTAneous BID  insulin lispro (HUMALOG) injection   SubCUTAneous AC&HS  aspirin delayed-release tablet 81 mg  81 mg Oral DAILY  arformoterol 15 mcg/budesonide 0.5 mg neb solution   Nebulization BID RT  
 carvediloL (COREG) tablet 25 mg  25 mg Oral BID WITH MEALS  ergocalciferol capsule 50,000 Units  50,000 Units Oral Q7D  
 ferrous sulfate tablet 325 mg  1 Tab Oral DAILY WITH BREAKFAST  gabapentin (NEURONTIN) capsule 300 mg  300 mg Oral DAILY  hydrOXYzine HCL (ATARAX) tablet 10 mg  10 mg Oral TID PRN  
 icosapent ethyL (VASCEPA) capsule 1 Cap  1 Cap Oral BID WITH MEALS  
 PARoxetine (PAXIL) tablet 30 mg  30 mg Oral DAILY  sacubitriL-valsartan (ENTRESTO)  mg tablet 1 Tab  1 Tab Oral BID  tolterodine (DETROL) tablet 8 mg  8 mg Oral BID  hydrALAZINE (APRESOLINE) 20 mg/mL injection 5 mg  5 mg IntraVENous Q4H PRN  
 acetaminophen (TYLENOL) tablet 650 mg  650 mg Oral Q4H PRN Facility-Administered Medications Ordered in Other Encounters Medication Dose Route Frequency  [START ON 11/3/2020] ferumoxytoL (FERAHEME) 510 mg in 0.9% sodium chloride 100 mL, overfill volume 10 mL IVPB  510 mg IntraVENous ONCE  
 [START ON 11/3/2020] cloNIDine HCL (CATAPRES) tablet 0.1 mg  0.1 mg Oral Q4H PRN  
 [START ON 11/3/2020] diphenhydrAMINE (BENADRYL) capsule 25 mg  25 mg Oral ONCE  
 [START ON 11/3/2020] acetaminophen (TYLENOL) tablet 650 mg  650 mg Oral ONCE  
 
______________________________________________________________________ EXPECTED LENGTH OF STAY: - - - 
ACTUAL LENGTH OF STAY:          5 Codie Gay MD

## 2020-11-01 NOTE — PROGRESS NOTES
Pharmacist Note  Warfarin Dosing Consult provided for this 78 y. o.female to manage warfarin for Atrial Fibrillation, LVAD INR Goal: 2 - 2.5 Home regimen/ tablet size: 4mg qd Drugs that may increase INR: None Drugs that may decrease INR: None Other current anticoagulants/ drugs that may increase bleeding risk: None Risk factors: Age > 65 and Decompensated Heart Failure Daily INR ordered: YES Recent Labs 11/01/20 
1746 11/01/20 
0330 10/31/20 
0518 10/31/20 
0510 10/30/20 
1230 10/30/20 
0355 HGB  --  9.5*  --  9.5* 9.8*  --   
INR 2.4*  --  2.3*  --   --  2.4* Date               INR                  Dose 
10/26  2.9  4 mg  
10/27  --  --  
10/28  2.7  4 mg  
10/29  2.6  4 mg  
10/30  2.4  4.5 mg 
10/31  2.3  4 mg           
11/1  2.4  4 mg Assessment/ Plan: Will order warfarin 4 mg PO x 1 dose. Pharmacy will continue to monitor daily and adjust therapy as indicated. Please contact the pharmacist at [phone] for outpatient recommendations if needed. Nacho Sanford, PharmD, BCPP, BCPS Clinical Pharmacy Specialist, [facility]

## 2020-11-01 NOTE — PROGRESS NOTES
ID Progress Note 
2020 Subjective:  
Denies any discomfort at present time. Review of Systems: 
         
Symptom Y/N Comments   Symptom Y/N Comments Fever/Chills n      Chest Pain n      
Poor Appetite       Edema Cough       Abdominal Pain Sputum       Joint Pain SOB/HAIRSTON  n     Pruritis/Rash Nausea/vomit  n     Tolerating PT/OT Diarrhea  n     Tolerating Diet Constipation  n     Other Could NOT obtain due to:    
 
Objective:  
 
Vitals:  
Visit Vitals BP (!) 90/58 (BP 1 Location: Right arm, BP Patient Position: At rest) Pulse 66 Temp 97.7 °F (36.5 °C) Resp 18 Ht 5' 7\" (1.702 m) Wt 132.5 kg (292 lb 1.8 oz) SpO2 96% BMI 45.75 kg/m² Tmax:  Temp (24hrs), Av.1 °F (36.7 °C), Min:97.7 °F (36.5 °C), Max:98.5 °F (36.9 °C) PHYSICAL EXAM: 
General: Morbidly obese. Alert, cooperative, no acute distress EENT:  EOMI. Anicteric sclerae. MMM Resp:  Clear in apex with decreased breath sounds at bases, no wheezing or rales. No accessory muscle use CV:  Regular  rhythm,  trace of pitting edema, VAD hum, Clear drainage from sternal wound; size - base of Q-tip GI:  Soft, Non distended, Non tender. +Bowel sounds Neurologic:  Alert and oriented X 3, normal speech, Psych:   Good insight. Not anxious nor agitated Skin:  bumpy, dusky below knees, right anterior wound packed with nu guaze, diffuse erythema, not tender to touch . No jaundice Labs:  
Lab Results Component Value Date/Time WBC 6.9 2020 03:30 AM  
 HGB 9.5 (L) 2020 03:30 AM  
 HCT 32.1 (L) 2020 03:30 AM  
 PLATELET 037  03:30 AM  
 MCV 85.1 2020 03:30 AM  
 
Lab Results Component Value Date/Time  Sodium 138 2020 03:30 AM  
 Potassium 3.8 2020 03:30 AM  
 Chloride 103 2020 03:30 AM  
 CO2 31 2020 03:30 AM  
 Anion gap 4 (L) 2020 03:30 AM  
 Glucose 148 (H) 2020 03:30 AM  
 BUN 35 (H) 11/01/2020 03:30 AM  
 Creatinine 1.82 (H) 11/01/2020 03:30 AM  
 BUN/Creatinine ratio 19 11/01/2020 03:30 AM  
 GFR est AA 33 (L) 11/01/2020 03:30 AM  
 GFR est non-AA 28 (L) 11/01/2020 03:30 AM  
 Calcium 9.1 11/01/2020 03:30 AM  
 Bilirubin, total 0.4 11/01/2020 03:30 AM  
 Alk. phosphatase 77 11/01/2020 03:30 AM  
 Protein, total 7.6 11/01/2020 03:30 AM  
 Albumin 3.2 (L) 11/01/2020 03:30 AM  
 Globulin 4.4 (H) 11/01/2020 03:30 AM  
 A-G Ratio 0.7 (L) 11/01/2020 03:30 AM  
 ALT (SGPT) 8 (L) 11/01/2020 03:30 AM  
 
Chest CT (10/27/2020): In the midline inferior to the sternum and expected location of the manubrium 
and apparently socially with skin defect is a subcutaneous oval-shaped density 
measuring between 35 and 54 Hounsfield units. This has well-defined margins 
without adjacent stranding. This could represent high density infected fluid. This could represent granulation tissue this could represent a combination of 
both. This does extend down to the level of the left ventricular assist device 
outflow tract anteriorly. There is no associated gas collection. Slightly more 
superiorly and along the anterior aspect of the outflow tract cannula is a small 
triangular area of soft tissue density measuring 54 Hounsfield units that could 
represent granulation tissue or inflammatory reaction. This is not associated 
with fluid density or air density Assessment and Plan Right lower extremity cellulitis in presence of venous stasis ? Sternal wound infection 
- blood cx (10/27) no growth  
  wound cx from sternal and right lower extremity wound ; RARE DIPHTHEROIDS & GNR (waiting for identification and sensitivity) Continue with IV cefepime & daptomycin. IV Vancomycin changed to IV daptomycin due to worsening of creat. ; will recheck in am. 
  
LVAD/ICD Acute on chronic systolic heart failure - cardiology following EF 15-20% 
  
Type III DM with neuropathy - A1C 8.4. continue with insulin therapy Continue with neurontin 
  
ROCIO on CKD 
- creat 1.8 continue to monitor   Management per primary care team 
 
 
 
Oly Casas, NP

## 2020-11-01 NOTE — PROGRESS NOTES
6862 Bedside and Verbal shift change report given to AARON Collins (oncoming nurse) by Benjamin Higginbotham (offgoing nurse). Report included the following information SBAR, Kardex, Intake/Output, MAR, Recent Results, Med Rec Status and Cardiac Rhythm Paced. 1215 Orthostatic MAPs obtained. See flowsheet for LVAD information. Supine MAP: 78 Sitting MAP: 78 Standing MAP: 68 
 
1300 IV access unable to flush. Attempted IV placement x2. Additional assistance requested. 1630 22g IV obtained. 1950 Bedside and Verbal shift change report given to Jacob Reese RN (oncoming nurse) by Naya Peterson (offgoing nurse). Report included the following information SBAR, Kardex, Intake/Output, MAR, Recent Results and Med Rec Status. Problem: Diabetes Self-Management Goal: *Using medications safely Description: State effect of diabetes medications on diabetes; name diabetes medication taking, action and side effects. Outcome: Progressing Towards Goal 
Goal: *Monitoring blood glucose, interpreting and using results Description: Identify recommended blood glucose targets  and personal targets. Outcome: Progressing Towards Goal 
Goal: *Prevention, detection, treatment of acute complications Description: List symptoms of hyper- and hypoglycemia; describe how to treat low blood sugar and actions for lowering  high blood glucose level. Outcome: Progressing Towards Goal 
Goal: *Prevention, detection and treatment of chronic complications Description: Define the natural course of diabetes and describe the relationship of blood glucose levels to long term complications of diabetes. Outcome: Progressing Towards Goal 
  
Problem: Pressure Injury - Risk of 
Goal: *Prevention of pressure injury Description: Document Jaime Scale and appropriate interventions in the flowsheet.  
Outcome: Progressing Towards Goal 
Note: Pressure Injury Interventions: 
Sensory Interventions: Chair cushion, Float heels, Minimize linen layers, Maintain/enhance activity level, Keep linens dry and wrinkle-free, Turn and reposition approx. every two hours (pillows and wedges if needed) Moisture Interventions: Absorbent underpads, Apply protective barrier, creams and emollients, Check for incontinence Q2 hours and as needed, Internal/External urinary devices, Minimize layers, Offer toileting Q_hr Activity Interventions: Chair cushion, Increase time out of bed, Pressure redistribution bed/mattress(bed type), PT/OT evaluation Mobility Interventions: Chair cushion, HOB 30 degrees or less, Pressure redistribution bed/mattress (bed type), PT/OT evaluation Nutrition Interventions: Document food/fluid/supplement intake Friction and Shear Interventions: Lift sheet, Minimize layers, Apply protective barrier, creams and emollients Problem: Patient Education: Go to Patient Education Activity Goal: Patient/Family Education Outcome: Progressing Towards Goal 
  
Problem: Heart Failure: Day 5 Goal: Discharge Planning Outcome: Progressing Towards Goal 
Goal: Medications Outcome: Progressing Towards Goal 
Goal: Respiratory Outcome: Progressing Towards Goal 
Goal: Treatments/Interventions/Procedures Outcome: Progressing Towards Goal 
Goal: Psychosocial 
Outcome: Progressing Towards Goal

## 2020-11-02 NOTE — PROGRESS NOTES
0957hrs:  RN spoke with Nuclear Med. New order for amyloid study in place, however, unable to perform today as Nuc. Med is booked. Test can be performed around 1000hrs tomorrow (11/3/2020). 1430hrs:  RN spoke with Alessia Willingham NP. Peripheral IV access lost.  Left limb alert. Ok for midline when PICC team calls back. Attempts at placing peripheral IV unsuccessful. PICC team paged by RN and NP. RN left voicemail. Dapto completed but patient has other IV medications that will need to be administered. 1452hrs:  RN spoke with Leyla Gorman PICC RN regarding IV access. The PICC team will be by to assess for access. Afternoon antibiotic re-timed for 1600hrs. 1600hrs:  Leyla Gorman PICC RN able to place two peripheral IV's via US in right arm. Patient may benefit from an EJ but is not a candidate for midline for dapto administration. 1613hrs:  Dr. Lopez Shoulders at bedside. 1850hrs:  RLQ LVAD driveline dressing changed using sterile technique. Site noted to have small amount of bloody drainage and dry crust.  Site cleaned with CHG and normal saline. Sterile dressing applied over site. New anchor applied. Patient encouraged to secure her .   Patient encouraged to bring personal dressing kit from home as the hospital kits have caused her some irritation in the past.

## 2020-11-02 NOTE — PROGRESS NOTES
Problem: Mobility Impaired (Adult and Pediatric) Goal: *Acute Goals and Plan of Care (Insert Text) Description: FUNCTIONAL STATUS PRIOR TO ADMISSION: Patient was modified independent using a rollator walker for functional mobility. HOME SUPPORT PRIOR TO ADMISSION: The patient lived alone with daughter lives in the area available to provide assistance. Patient has groceries delivered, cleaning person every other week. Physical Therapy Goals Initiated 10/30/2020 1. Patient will move from supine to sit and sit to supine , scoot up and down, and roll side to side in bed with modified independence within 7 day(s). 2.  Patient will transfer from bed to chair and chair to bed with modified independence using the least restrictive device within 7 day(s). 3.  Patient will ambulate with modified independence for 300 feet with the least restrictive device within 7 day(s). Outcome: Progressing Towards Goal 
 PHYSICAL THERAPY TREATMENT Patient: Raf Pena (54 y.o. female) Date: 11/2/2020 Diagnosis: Acute on chronic systolic CHF (congestive heart failure) ([facility] [I50.23] Acute on chronic systolic CHF (congestive heart failure) ([facility] [address] [facility] Precautions:   
Chart, physical therapy assessment, plan of care and goals were reviewed. ASSESSMENT Patient continues with skilled PT services and is progressing towards goals. Received patient sitting up in the chair. Patient is indep w/ LVAD management. She demonstrates independence with transfers (sit<->stand) and ambulation with her rollator walker. Therapy did not assess bed mobility though pt voiced independence with this since this hospitalization. Patient feels she is functioning at her baseline mobility. With he exception of gait distance, patient has achieved therapy goals. She voiced possibly having pacemaker implant this week. Therapy will continue to follow patient peripherally for follow up/ reassessment after.    At this time, therapy will decrease frequency to 2x/ wk. Recommend with nursing patient to complete as able in order to maintain strength, endurance and independence: OOB to chair 3x/day and walking daily in the woods with her rollator walker. Thank you for your assistance. Current Level of Function Impacting Discharge (mobility/balance): Mod Indep w/ transfers and ambulation 200 ft w/ rollator walker. Pt voiced indep getting in/ out of bed Other factors to consider for discharge: lives alone PLAN : 
Patient continues to benefit from skilled intervention to address the above impairments. Continue treatment per established plan of care. to address goals. Recommendation for discharge: (in order for the patient to meet his/her long term goals) No skilled physical therapy/ follow up rehabilitation needs identified at this time. This discharge recommendation: A follow-up discussion with the attending provider and/or case management is planned IF patient discharges home will need the following DME: patient owns DME required for discharge SUBJECTIVE:  
Patient stated I'm doing fine.  OBJECTIVE DATA SUMMARY:  
Critical Behavior: 
Neurologic State: Alert, Appropriate for age Orientation Level: Oriented X4, Appropriate for age Cognition: Appropriate decision making, Appropriate for age attention/concentration, Appropriate safety awareness, Follows commands, Recognition of people/places Safety/Judgement: Awareness of environment Functional Mobility Training: 
Bed Mobility: 
N/t* sitting up in the chair on arrival.  Pt voiced indep getting in and out of bed intermittently using belt/ cord as a leg grabber. Transfers: 
Sit to Stand: Modified independent Stand to Sit: Modified independent Balance: 
Sitting: Intact; Without support Standing: Intact; With support Ambulation/Gait Training: 
Distance (ft): 200 Feet (ft)(standing rest at 150 ft) Assistive Device: Gait belt;Walker, rollator Ambulation - Level of Assistance: Modified independent; Adaptive equipment Gait Abnormalities: Decreased step clearance;Trunk sway increased Base of Support: Widened Speed/Ivana: Slow Pain Rating: 
Voiced no pain Activity Tolerance:  
Fair Please refer to the flowsheet for vital signs taken during this treatment. After treatment patient left in no apparent distress:  
Sitting in chair and Call bell within reach COMMUNICATION/COLLABORATION:  
The patients plan of care was discussed with: Registered nurse. Ryley Patel PT Time Calculation: 20 mins

## 2020-11-02 NOTE — PROGRESS NOTES
ID Progress Note 
2020 Subjective: She is feeling ok today--for cardiac cath tomorrow Objective: Antibiotics: 1. Daptomycin 2. Cefepime Vitals:  
Visit Vitals BP (!) 90/58 Pulse 70 Temp 97.9 °F (36.6 °C) Resp 20 Ht 5' 7\" (1.702 m) Wt 138 kg (304 lb 3.8 oz) SpO2 98% BMI 47.65 kg/m² Tmax:  Temp (24hrs), Av.1 °F (36.7 °C), Min:97.9 °F (36.6 °C), Max:98.3 °F (36.8 °C) Exam:  Sternal wound is dry, still with some drainage from the right shin wound. Minimal cellulitis present. Labs:     
Recent Labs 20 
0342 20 
0330 10/31/20 
0510 WBC 7.6 6.9 7.1 HGB 9.4* 9.5* 9.5*  196 202 BUN 43* 35* 30* CREA 2.14* 1.82* 1.72* AP 89 77 85 TBILI 0.3 0.4 0.4 Cultures: No results found for: SDES Lab Results Component Value Date/Time Culture result: SCANT Morganella morganii ssp morganii (A) 10/30/2020 11:30 AM  
 Culture result: SCANT Morganella morganii ssp morganii (A) 10/30/2020 11:30 AM  
 Culture result: RARE DIPHTHEROIDS (A) 10/30/2020 11:30 AM  
 
 
Radiology:  
 
Line/Insert Date:        
 
Assessment: 1. SWI--chronic 2. Right shin wound--Morganella from culture 3. LVAD Objective: 1. Continue current therapy Opal Calabrese MD

## 2020-11-02 NOTE — PROGRESS NOTES
[facility] VAD/Heart Failure Progress Note Admit Date: 10/27/2020 POD:  * No surgery date entered * Procedure:  Procedure(s): RIGHT HEART CATH Subjective:  
Planning for RHC; want to take a look at wound at some point Objective:  
Vitals: 
Blood pressure (!) 90/58, pulse 70, temperature 97.9 °F (36.6 °C), resp. rate 20, height 5' 7\" (1.702 m), weight 304 lb 3.8 oz (138 kg), SpO2 98 %. Temp (24hrs), Av.1 °F (36.7 °C), Min:97.9 °F (36.6 °C), Max:98.3 °F (36.8 °C) Hemodynamics: 
 CO:   
 CI:   
 CVP:   
 SVR:   
 PAP Systolic:   
 PAP Diastolic:   
 PVR:   
 IE27:   
 SCV02:   
 
VAD Interrogation: LVAD (Heartmate) Pump Speed (RPM): 9400 Pump Flow (LPM): 4.4 PI (Pulsitility Index): 3.1 Power: 5.3 MAP: 90  Test: No 
Back Up  at Bedside & Labeled: Yes Power Module Test: No 
Driveline Site Care: No 
Driveline Dressing: Clean, Dry, and Intact EKG/Rhythm:   
 
Extubation Date / Time:  
 
CT Output:  
 
Ventilator: 
  
 
Oxygen Therapy: 
Oxygen Therapy O2 Sat (%): 98 % (20 1500) Pulse via Oximetry: 62 beats per minute (20 0813) O2 Device: Room air (20 1500) CXR: 
 
Admission Weight: Last Weight Weight: 288 lb 12.8 oz (131 kg) Weight: 304 lb 3.8 oz (138 kg) Intake / Output / Drain: 
Current Shift:  0701 -  1900 In: 900 [P.O.:900] Out: 650 [Urine:650] Last 24 hrs.:  
 
Intake/Output Summary (Last 24 hours) at 2020 1639 Last data filed at 2020 1500 Gross per 24 hour Intake 1310 ml Output 1300 ml Net 10 ml Recent Labs 20 
0488 10/31/20 
3475  63 Recent Labs 20 
0342 20 
0330 10/31/20 
0510  138 141  
K 4.0 3.8 4.0  
CO2 31 31 29 BUN 43* 35* 30* CREA 2.14* 1.82* 1.72* * 148* 172* MG 2.1 2.0 2.1 WBC 7.6 6.9 7.1 HGB 9.4* 9.5* 9.5* HCT 31.8* 32.1* 32.0*  
 196 202 Recent Labs   20 
0158 11/01/20 
0344 10/31/20 
0518 INR 2.4* 2.4* 2.3* PTP 24.5* 24.0* 23.5* APTT 50.5* 48.8*  -- No lab exists for component: PBNP Current Facility-Administered Medications:  
  warfarin (COUMADIN) tablet 4 mg, 4 mg, Oral, ONCE, Festus Ribeiro MD 
  influenza vaccine 2020-21 (6 mos+)(PF) (FLUARIX/FLULAVAL/FLUZONE QUAD) injection 0.5 mL, 0.5 mL, IntraMUSCular, PRIOR TO DISCHARGE, Braxton, Triny B, NP 
  pneumococcal 23-valent (PNEUMOVAX 23) injection 0.5 mL, 0.5 mL, IntraMUSCular, PRIOR TO DISCHARGE, Braxton, Triny B, NP 
  insulin NPH (NOVOLIN N, HUMULIN N) injection 25 Units, 25 Units, SubCUTAneous, BID, Braxton, Triny B, NP 
  insulin lispro (HUMALOG) injection 6 Units, 6 Units, SubCUTAneous, TIDAC, Braxton, Triny B, NP, 6 Units at 11/02/20 1231   bumetanide (BUMEX) tablet 2 mg, 2 mg, Oral, DAILY, Javi Waltern Susanne T, NP, 2 mg at 11/02/20 2078   DAPTOmycin (CUBICIN) 550 mg in 0.9% sodium chloride 50 mL IVPB RF formulation, 4 mg/kg, IntraVENous, Q24H, Oly Belle NP, Last Rate: 100 mL/hr at 11/02/20 1241, 550 mg at 11/02/20 1241 
  amLODIPine (NORVASC) tablet 2.5 mg, 2.5 mg, Oral, BID, Toni Walter T, NP, 2.5 mg at 11/02/20 0911 
  magnesium oxide (MAG-OX) tablet 400 mg, 400 mg, Oral, BID, Teja Cornejo NP, 400 mg at 11/02/20 0451   cefepime (MAXIPIME) 2 g in 0.9% sodium chloride (MBP/ADV) 100 mL, 2 g, IntraVENous, Q24H, Puneet Méndez MD, Last Rate: 200 mL/hr at 11/02/20 1614, 2 g at 11/02/20 1614   ammonium lactate (LAC-HYDRIN) 12 % lotion, , Topical, BID, Oly Belle, TEA 
  albuterol (PROVENTIL VENTOLIN) nebulizer solution 2.5 mg, 2.5 mg, Nebulization, BID PRN, Disha Hawley MD 
  sodium chloride (NS) flush 5-40 mL, 5-40 mL, IntraVENous, Q8H, Disha Hawley MD, 10 mL at 11/02/20 1614 
  sodium chloride (NS) flush 5-40 mL, 5-40 mL, IntraVENous, PRN, Disha Hawley MD 
  polyethylene glycol (MIRALAX) packet 17 g, 17 g, Oral, DAILY PRN, Marleen, Disha ENCARNACION MD 
  glucose chewable tablet 16 g, 4 Tab, Oral, PRN, Disha Hawley MD 
  glucagon (GLUCAGEN) injection 1 mg, 1 mg, IntraMUSCular, PRN, Disha Hawley MD 
  sodium chloride (NS) flush 5-40 mL, 5-40 mL, IntraVENous, Q8H, Stephanie Baires E, NP, 10 mL at 11/02/20 1613 
  sodium chloride (NS) flush 5-40 mL, 5-40 mL, IntraVENous, PRN, Stephanie Baires, TEA 
  Warfarin - Pharmacy to Dose, , Other, Rx Dosing/Monitoring, Jorge Ward MD 
  dextrose 10% infusion 0-250 mL, 0-250 mL, IntraVENous, PRN, Stephanie Baires, TEA 
  insulin lispro (HUMALOG) injection, , SubCUTAneous, AC&HS, Stephanie Baires, TEA, 2 Units at 11/02/20 1231   aspirin delayed-release tablet 81 mg, 81 mg, Oral, DAILY, Disha Hawley MD, 81 mg at 11/02/20 0911 
  arformoterol 15 mcg/budesonide 0.5 mg neb solution, , Nebulization, BID RT, Disha Hawley MD 
  carvediloL (COREG) tablet 25 mg, 25 mg, Oral, BID WITH MEALS, Disha Hawley MD, 25 mg at 11/02/20 6174   ergocalciferol capsule 50,000 Units, 50,000 Units, Oral, Q7D, Disha Hawley MD, 50,000 Units at 10/29/20 4927   ferrous sulfate tablet 325 mg, 1 Tab, Oral, DAILY WITH BREAKFAST, Disha Hawley MD, 325 mg at 11/02/20 0911 
  gabapentin (NEURONTIN) capsule 300 mg, 300 mg, Oral, DAILY, Disha Hawley MD, 300 mg at 11/02/20 4277   hydrOXYzine HCL (ATARAX) tablet 10 mg, 10 mg, Oral, TID PRN, Disha Hawley MD 
  icosapent ethyL (VASCEPA) capsule 1 Cap, 1 Cap, Oral, BID WITH MEALS, Disha Hawley MD, Stopped at 10/31/20 0800   PARoxetine (PAXIL) tablet 30 mg, 30 mg, Oral, DAILY, Disha Hawley MD, 30 mg at 11/02/20 2336   sacubitriL-valsartan (ENTRESTO)  mg tablet 1 Tab, 1 Tab, Oral, BID, [facility] Fede ENCARNACION MD, 1 Tab at 11/02/20 6407   tolterodine (DETROL) tablet 8 mg, 8 mg, Oral, BID, Disha Hawley MD, Stopped at 10/31/20 0900   hydrALAZINE (APRESOLINE) 20 mg/mL injection 5 mg, 5 mg, IntraVENous, Q4H PRN, Geo Isaac MD 
  acetaminophen (TYLENOL) tablet 650 mg, 650 mg, Oral, Q4H PRN, Disha Hawley MD 
 
Facility-Administered Medications Ordered in Other Encounters:  
  [START ON 11/3/2020] ferumoxytoL (FERAHEME) 510 mg in 0.9% sodium chloride 100 mL, overfill volume 10 mL IVPB, 510 mg, IntraVENous, ONCE, MD [facility]  [START ON 11/3/2020] cloNIDine HCL (CATAPRES) tablet 0.1 mg, 0.1 mg, Oral, Q4H PRN, MD [facility]  [START ON 11/3/2020] diphenhydrAMINE (BENADRYL) capsule 25 mg, 25 mg, Oral, ONCE, MD [facility]  [START ON 11/3/2020] acetaminophen (TYLENOL) tablet 650 mg, 650 mg, Oral, ONCE, Luis Crockett MD 
 
A/P 
  
S/P LVAD - good flows Need for anticoagulation - coumadin Depression - home meds Chronic wound infection - Abx's  
  
Risk of morbidity and mortality - high Medical decision making - high complexity 
  
 
Signed By: May Momin MD

## 2020-11-02 NOTE — CONSULTS
Cardiology Consult Note Patient Name: Modesto Bustos  : 1950 MRN: [id number] Date: 2020  Time: 2:20 PM 
 
Admit Diagnosis: Acute on chronic systolic CHF (congestive heart failure) ([facility] [address] [I50.23] Primary Cardiologist:Dr. Jannis Mcburney, MD   Consulting Cardiologist: JULIO Bernstein Miss for Consult: RHC, establish general cardiologist and EP Requesting MD: Kayli Epstein MD 
 
HPI: 
Modesto Bustos is a 78 y.o. female admitted on 10/27/2020  for Acute on chronic systolic CHF (congestive [facility] [I50.23]. Has past medical history of chronic systolic CHF status post AICD, statuscardiogenic shock, s/p impella bridge to LVAD, post LVAD implantation (4/15/2017 by Dr. Laura Tim), RV failure, CAD (left heart catheterization 2016 with high-grade ramus and small PDA disease borderline disease of LAD and takeoff of pRCA), A. fib, pulmonary hypertension, JED on CPAP, diabetes type 2, HLD, severe COPD, CKD, morbid obesity, hypertension, breast cancer status post bilateral mastectomy/chemo, and endometrial cancer status post hysterectomy, lymphedema left lower extremity, severe COPD. Had previously been followed by advanced heart failure at [facility] but now followed by Dr. Jose Diaz. Reports she has been being treated for abscess of sternal wound since late . She presented on 10/32 2020 with right lower extremity cellulitis and sternal wound infection is suspected. ID is following and patient is on daptomycin. Cardiology has been asked to perform right heart cath and to establish general cardiology care. Patient is on chronic anticoagulation for LVAD with Coumadin. Patient denies any history of MI. She does report history of Plavix resistance. Her daughter reports patient has coronary stent she believes in perhaps 2017.   Patient reports she is able to walk in the hallways without chest pain does develop shoulder pain which she attributes to leaning on walker. Has intermittent shortness of breath. Currently denies chest pain shortness of breath. States she is able to lay flat. No recent history of AICD firing she is agreeable to right heart cath tomorrow. SH: never smoker, no ETOH. ,   8 years ago FH: Mother had DM, HTN, CAD  at at age 76. Father  at [de-identified], HTN, CAD, MI. Brother valve replacement Assessment and Plan 1. Acute on chronic systolic CHF and RV failure/Cardiomyopathy -NYHA IV 
 -s/p LVAD   
 -s/p ICD  
 -Management per AHF  
 -Coreg, entresto,bumex 
 -RHC tomorrow at 8:15AM per AHF request. 
 
2. Hx of CAD:  
  -[facility] 2016 with high-grade ramus and small PDA disease borderline disease of LAD and takeoff of pRCA) -Request records from [facility]. ?hx of stents. -CAD Appears stable 
 -continue ASA, not on statin PTA (await records for reason) 3. Venous stasis, RLE venous stasis wound, RLE cellulitis 
 -plan outpatient venous ultrasound for reflux evaluation 4. Hx of Afib 
 -currently v paced 
 -on Coumadin for LVAD 5. HLD: , HDL 29. Consider statin/ await records 6. Hx of AICD: [employer] 
 -Will ask Dr. Teena Link to see to establish care/follow up 
 -Had PPM interrogated this admission- normal pacing and sensing. 7. Sternal wound infection 
 -management per ID and CT surgery. 78 y.o. female with hx of CAD, HFrEF, RV failure s/p ICD and LVAD, now presents with RLE cellulitis,venous stasis wound and ?sternal wound infection. Will proceed with RHC tomorrow at 8:15 AM. Will ask Dr. Teena Link to see to establish EP care with [facility]. CARDIAC IMAGING: 
Echo 20- pending Echo (19) LVEF 20-25%, AV opens 1:1, no AR Echo (18) LVEF 10-%, ramps study done, report in [facility] Echo (18) ramp study done, LVEDD 7.1cm LHC (18) 2 vessel disease with 90% OM, 80% PDA, DSA to PDA branch 
TTE (10/27/20) LVEF 15-20% LVIDd 7.26cm RVIDd 2.82cm Tapse 1.14cm 
  
Review of Symptoms: 
Constitutional: No fevers or chills. HEET: No trauma. No visual changes. No hearing loss. +scratchy throat Heart: No chest pain or palpitations. Lungs:No shortness of breath. No cough. Abdomen: No pain. No nausea of vomiting. No BRBPR or melena. No diarrhea. Urology: No dysuria or hematuria. Ext: +RLE edemaNo edema. Skin: +sternal wound and RLE wound Endocrine: No heat or cold intolerance. Neuro: No transient neurologic deficit Past Medical History:  
Diagnosis Date  Asthma  Cancer ([facility] [address] breast  
 [facility]   
 endometrial  
 Congestive heart failure, unspecified  CRI (chronic renal insufficiency)  Depression  Diabetes ([facility]  Hypertension Past Surgical History:  
Procedure Laterality Date  HX HERNIA REPAIR    
 HX HYSTERECTOMY  HX MASTECTOMY Current Facility-Administered Medications Medication Dose Route Frequency  warfarin (COUMADIN) tablet 4 mg  4 mg Oral ONCE  
 influenza vaccine 2020-21 (6 mos+)(PF) (FLUARIX/FLULAVAL/FLUZONE QUAD) injection 0.5 mL  0.5 mL IntraMUSCular PRIOR TO DISCHARGE  pneumococcal 23-valent (PNEUMOVAX 23) injection 0.5 mL  0.5 mL IntraMUSCular PRIOR TO DISCHARGE  insulin NPH (NOVOLIN N, HUMULIN N) injection 25 Units  25 Units SubCUTAneous BID  insulin lispro (HUMALOG) injection 6 Units  6 Units SubCUTAneous TIDAC  bumetanide (BUMEX) tablet 2 mg  2 mg Oral DAILY  DAPTOmycin (CUBICIN) 550 mg in 0.9% sodium chloride 50 mL IVPB RF formulation  4 mg/kg IntraVENous Q24H  
 amLODIPine (NORVASC) tablet 2.5 mg  2.5 mg Oral BID  magnesium oxide (MAG-OX) tablet 400 mg  400 mg Oral BID  cefepime (MAXIPIME) 2 g in 0.9% sodium chloride (MBP/ADV) 100 mL  2 g IntraVENous Q24H  
 ammonium lactate (LAC-HYDRIN) 12 % lotion   Topical BID  albuterol (PROVENTIL VENTOLIN) nebulizer solution 2.5 mg  2.5 mg Nebulization BID PRN  
 sodium chloride (NS) flush 5-40 mL  5-40 mL IntraVENous Q8H  
 sodium chloride (NS) flush 5-40 mL  5-40 mL IntraVENous PRN  polyethylene glycol (MIRALAX) packet 17 g  17 g Oral DAILY PRN  
 glucose chewable tablet 16 g  4 Tab Oral PRN  
 glucagon (GLUCAGEN) injection 1 mg  1 mg IntraMUSCular PRN  
 sodium chloride (NS) flush 5-40 mL  5-40 mL IntraVENous Q8H  
 sodium chloride (NS) flush 5-40 mL  5-40 mL IntraVENous PRN  
 Warfarin - Pharmacy to Dose   Other Rx Dosing/Monitoring  dextrose 10% infusion 0-250 mL  0-250 mL IntraVENous PRN  
 insulin lispro (HUMALOG) injection   SubCUTAneous AC&HS  aspirin delayed-release tablet 81 mg  81 mg Oral DAILY  arformoterol 15 mcg/budesonide 0.5 mg neb solution   Nebulization BID RT  
 carvediloL (COREG) tablet 25 mg  25 mg Oral BID WITH MEALS  ergocalciferol capsule 50,000 Units  50,000 Units Oral Q7D  
 ferrous sulfate tablet 325 mg  1 Tab Oral DAILY WITH BREAKFAST  gabapentin (NEURONTIN) capsule 300 mg  300 mg Oral DAILY  hydrOXYzine HCL (ATARAX) tablet 10 mg  10 mg Oral TID PRN  
 icosapent ethyL (VASCEPA) capsule 1 Cap  1 Cap Oral BID WITH MEALS  
 PARoxetine (PAXIL) tablet 30 mg  30 mg Oral DAILY  sacubitriL-valsartan (ENTRESTO)  mg tablet 1 Tab  1 Tab Oral BID  tolterodine (DETROL) tablet 8 mg  8 mg Oral BID  hydrALAZINE (APRESOLINE) 20 mg/mL injection 5 mg  5 mg IntraVENous Q4H PRN  
 acetaminophen (TYLENOL) tablet 650 mg  650 mg Oral Q4H PRN Facility-Administered Medications Ordered in Other Encounters Medication Dose Route Frequency  [START ON 11/3/2020] ferumoxytoL (FERAHEME) 510 mg in 0.9% sodium chloride 100 mL, overfill volume 10 mL IVPB  510 mg IntraVENous ONCE  
 [START ON 11/3/2020] cloNIDine HCL (CATAPRES) tablet 0.1 mg  0.1 mg Oral Q4H PRN  
 [START ON 11/3/2020] diphenhydrAMINE (BENADRYL) capsule 25 mg  25 mg Oral ONCE  
 [START ON 11/3/2020] acetaminophen (TYLENOL) tablet 650 mg  650 mg Oral ONCE No Known Allergies History reviewed. No pertinent family history. Social History Socioeconomic History  Marital status:  Spouse name: Not on file  Number of children: Not on file  Years of education: Not on file  Highest education level: Not on file Tobacco Use  Smoking status: Never Smoker  Smokeless tobacco: Never Used Substance and Sexual Activity  Alcohol use: Not Currently Objective: 
  Physical Exam 
 
Vitals:  
Vitals:  
 11/02/20 0327 11/02/20 0813 11/02/20 0820 11/02/20 1134 BP:      
Pulse: 66  68 66 Resp: 18  18 18 Temp: 98.2 °F (36.8 °C)  98.1 °F (36.7 °C) 98.3 °F (36.8 °C) SpO2: 98% 97% 98% 98% Weight: 306 lb (138.8 kg) Height:      
 
 
General:    Alert, cooperative, no distress, appears stated age. Morbidly obese Neck:   Supple, no carotid bruit and no JVD. Back:     Symmetric,   
Lungs:     Clear to auscultation bilaterally. Heart[de-identified]    VAD hum. Abdomen:     Soft, obese, non-tender. Bowel sounds normal. No masses,  No   
  organomegaly. Extremities:   RLE small wound. 1-2+BLE edema, venous stasis. Vascular:   Pulses -   
Skin:   Skin color normal. No rashes or lesions Neurologic:   MIJARES, Normal strength throughout. Telemetry:  vpaced ECG:  
 
Data Review:  
 
Radiology: XR Results (most recent): 
Results from Hospital Encounter encounter on 10/27/20 XR CHEST PORT Narrative EXAM: XR CHEST PORT INDICATION: heart failure COMPARISON: CT scan 10/27/2020 FINDINGS: A portable AP radiograph of the chest was obtained at 0932 hours. The 
patient is on a cardiac monitor. Patient status post median sternotomy. LVAD is 
in place. ICD is in place. There is moderate cardiomegaly. The lungs are clear. No pneumonia. No pulmonary edema. Impression IMPRESSION: 
1. The lungs are clear. There is no pneumonia and no pulmonary edema Recent Labs 11/02/20 
7117 10/31/20 
4679  63 Recent Labs   11/02/20 
0342 11/01/20 
0330  138  
K 4.0 3.8  103 CO2 31 31 BUN 43* 35* CREA 2.14* 1.82* * 148* CA 8.8 9.1 Recent Labs 11/02/20 
0342 11/01/20 
0330 WBC 7.6 6.9 HGB 9.4* 9.5* HCT 31.8* 32.1*  
 196 Recent Labs 11/02/20 
0342 11/01/20 0344 11/01/20 
0330 PTP 24.5* 24.0*  --   
INR 2.4* 2.4*  --   
AP 89  --  77 No results for input(s): CHOL, LDLC in the last 72 hours. No lab exists for component: TGL, HDLC,  HBA1C No results for input(s): CRP, TSH, TSHEXT in the last 72 hours. No lab exists for component: ESR Thank you very much for this referral. I appreciate the opportunity to participate in this patient's care. I will follow along with above stated plan. Zoë Swan. TEA [facility], [address] [facility] 
   [phone] Anjali Zaidi NP

## 2020-11-02 NOTE — DIABETES MGMT
[address] [facility] PROGRAM FOR DIABETES HEALTH 
 
DIABETES FOLLOW UP NOTE Presentation King Ginette is a 78 y.o. female who [facility] for follow of on chronic systolic heart failure. In the clinic she was noted to be in volume overload with concern for cellulitis. She was admitted for IV antibiotics and diuresis. HX: Chronic systolic heart failure s/p LVAD implantation with Heartmate II, Breast Cancer (1992) s/p bilateral mastectomy, endometrial cancer s/p hysterectomy, severe COPD, Depression, A Fib, CVA, s/p R hemiarthroplasty, CKD, pulmonary HTN, Hyperlipidemia, Diabetes DX: RV Failure, volume overload, RLE cellulitis, chronic sternal wound infection TX: ID consult, CT surgery for sternal wound consult, Diuresis Current clinical course has been uncomplicated. Diabetes: Patient has known Type two diabetes, treated with Levemir PTA. A1C 8.4% on admission. Family history positive for diabetes. Consulted by Provider for advanced diabetes nursing assessment and care, specifically related to  
[x] Inpatient management strategy [x] Home management assessment Diabetes-related medical history Acute complications: None Neurological complications Peripheral neuropathy Microvascular disease Nephropathy Macrovascular disease Cerebral vascular accident Diabetes medication history Drug class Currently in use Discontinued Never used Biguanide DDP-4 inhibitor Sulfonylurea Thiazolidinedione GLP-1 RA SGLT-2 inhibitors Basal insulin Levemir 40 units at bedtime Fixed Dose  Combinations Bolus insulin Subjective Admitting A1C 8.4% Glucose 104-261 NPH 20 units BID 10 units correctional insulin in the past 24 hours Appetite ok GFR 23 
IV antibiotics started, Blood cultures NGTD Plans for a RHC- today Objective Physical exam 
General Alert, oriented and in no acute distress/ill-appearing. Conversant and cooperative. Vital Signs Visit Vitals BP (!) 90/58 (BP 1 Location: Right arm, BP Patient Position: At rest) Pulse 68 Temp 98.1 °F (36.7 °C) Resp 18 Ht 5' 7\" (1.702 m) Wt 138.8 kg (306 lb) SpO2 98% BMI 47.93 kg/m² Skin  Warm and dry. Acanthosis noted along neckline. No lipohypertrophy or lipoatrophy noted at injection sites Heart   Regular rate and rhythm. No murmurs, rubs or gallops Lungs  Clear to auscultation without rales or rhonchi Extremities Sacral Wound POA, small healing sternal wound, RLE cellulitis Laboratory Lab Results Component Value Date/Time Hemoglobin A1c 8.4 (H) 10/28/2020 03:30 AM  
 
Lab Results Component Value Date/Time LDL Chol Calc (NIH) 123 (H) 10/01/2020 12:00 AM  
 
Lab Results Component Value Date/Time Creatinine 2.14 (H) 11/02/2020 03:42 AM  
 
Lab Results Component Value Date/Time Sodium 140 11/02/2020 03:42 AM  
 Potassium 4.0 11/02/2020 03:42 AM  
 Chloride 102 11/02/2020 03:42 AM  
 CO2 31 11/02/2020 03:42 AM  
 Anion gap 7 11/02/2020 03:42 AM  
 Glucose 185 (H) 11/02/2020 03:42 AM  
 BUN 43 (H) 11/02/2020 03:42 AM  
 Creatinine 2.14 (H) 11/02/2020 03:42 AM  
 BUN/Creatinine ratio 20 11/02/2020 03:42 AM  
 GFR est AA 28 (L) 11/02/2020 03:42 AM  
 GFR est non-AA 23 (L) 11/02/2020 03:42 AM  
 Calcium 8.8 11/02/2020 03:42 AM  
 Bilirubin, total 0.3 11/02/2020 03:42 AM  
 Alk. phosphatase 89 11/02/2020 03:42 AM  
 Protein, total 7.4 11/02/2020 03:42 AM  
 Albumin 3.1 (L) 11/02/2020 03:42 AM  
 Globulin 4.3 (H) 11/02/2020 03:42 AM  
 A-G Ratio 0.7 (L) 11/02/2020 03:42 AM  
 ALT (SGPT) 8 (L) 11/02/2020 03:42 AM  
 
Lab Results Component Value Date/Time ALT (SGPT) 8 (L) 11/02/2020 03:42 AM  
 
 
Factors affecting BG pattern Factor Dose Comments Nutrition: 
Carb-controlled meals 60 grams/meal On cardiac consistent carb diet Drugs: 
IV antibiotics On Zosyn, Vancomycin Pain Sacral pain Infection Sternal wound infection RLE cellulitis Other: CKD GFR 31 Blood glucose pattern Assessment and Plan Nursing Diagnosis Risk for unstable blood glucose pattern Nursing Intervention Domain 8851 Decision-making Support Nursing Interventions Examined current inpatient diabetes control Explored factors facilitating and impeding inpatient management Identified self-management practices impeding diabetes control Explored corrective strategies with patient and responsible inpatient provider Informed patient of rational for insulin strategy while hospitalized Instructed patient in correlation with elevated glucose and wound heeling, A1C and long term complications Evaluation Anisha  is a 78year old female with uncontrolled diabetes on once daily Levemir with LVAD who is admitted for RV failure, volume overload, RLE cellulitis and work-up for chronic sternal wound infection. A1C on admission is elevated at 8.4% and patient reports once daily glucose monitoring. Glucose certainly impacted by impaired insulin delivery due to perfusion, chronic infection and oral intake. Discussed the impact of elevated glucose on wound healing. It will be essential for her to have glucose control at this time due to current wounds and preventions of additional long term complications. Basal and correctional insulin restarted and glucose near goal of 100-180 in the inpatient setting. Recommendations Recommend: 1. Adjust basal insulin: NPH 25 units BID (0.2 units/kg/BID). 2. Correctional insulin ACHS at resistant sensitivity, start at a glucose of 200 
 
3. Add low dose bolus insulin. 0.05 units/kg/meal: 6 units Humalog with meals. Hold if patient consumes less than 50% of carbohydrates on meal tray. Can increase up to 14 units with meals as needed. On Discharge: 
1. Insulin adjustment based on glucose pattern in the inpatient setting.   
 
2. Patient to obtain glucose more frequently than fasting. Please check before meals and bedtime to ensure a rise in glucose throughout the day. A1C goal 7-7.5% 2. Patient needs a FUV with PCP within 1-2 weeks of hospital discharge for ongoing diabetes management. Patient does prefer to be seen by PCP which is reasonable at this time. If patient does not have glucose control at PCP, may benefit from specialty care. Billing Code(s) I personally reviewed chart, notes, data and current medications in the medical record, and examined the patient at bedside before making care recommendations. Thank you for including us in their care. I spent 15 minutes in direct patient care today for this patient. Time includes chart review, face to face with patient and collaboration with interdisciplinary care team. 
 
Krystal Rice, CNS Program for Diabetes Health Access via [facility] [phone]

## 2020-11-02 NOTE — PROGRESS NOTES
[facility] [facility]  Hospitalist Group Hospitalist Progress Note Nakita Mercado MD 
Answering service: [phone] OR [phone] from in house phone Date of Service:  2020 NAME:  Sedrick Faust :  1950 MRN:  [id number] Admission Summary: This is a 78year old female with a significant past medical history of chronic systolic heart failure due to ischemic cardiomyopathy with implanted left ventricular assist device. Other significant PMH:  type 2 diabetes, hypertension, obesity, COPD, depression invasion Interval history / Subjective:  
Patient is seen and examined. Feels pretty well, no acute events overnight, slept well. Final cultures results pending from sternum wound- scant GNR. cardio plan for RHC Assessment & Plan:  
Acute-on-chronic systolic congestive heart failure,, NYHA class IV. Ischemic cardiomyopathy- s/p LVAD 
- Being diuresed with IV Bumex. Continue other cardiac regimen: Carvedilol, Entresto. - Daily weight. Strict I&O- Further management by advanced CHF team. 
- plans for RHC ? Monday Suspect sternal wound infection: Antibiotics as above. Cardiac surgery consulted Cellulitis of RLE: background of venous stasis and small wound - ID following, cefepime and vancomycin 'switched to Dapto due to Cr rise 
- following cultures NGTD, ID took more cultures from sternum on 10/31 scant GNR 
 
HTN: chronic, stable, Home regimen, PRN BP meds. Monitor Chronic normocytic anemia. Hemoglobin stable between 89, iron/B12/folate wnl Type 2 diabetes with hyperglycemia: SSI- NPH 20 units twice daily. monitor COPD: stable-Continue maintenance inhaled steroids. Bronchodilators as needed. CKD3: avoid nephrotoxics. F/u with nephro op. Cr bumped up, titrating diuretics down Depression, stable. Continue  Paxil Morbid obesityBody mass index is 47.93 kg/m².   Counseled on weight loss #. L Labial hematoma: 2cm ovale bruise, likely hematoma from purewick use. Monitor, f/u op with gynecology if not resolving. Code status: Full code DVT prophylaxis: On warfarin Care Plan discussed with: Patient/Family and Nurse Anticipated Disposition: Home w/Family: Greater than 48 hours Hospital Problems  Date Reviewed: 10/28/2020 Codes Class Noted POA * (Principal) Acute on chronic systolic CHF (congestive heart failure) (HCC) ICD-10-CM: X01.14 ICD-9-CM: 428.23, 428.0  10/27/2020 Yes Review of Systems: A comprehensive review of systems was negative except for that written in the HPI. Vital Signs:  
 Last 24hrs VS reviewed since prior progress note. Most recent are: 
Visit Vitals BP (!) 90/58 (BP 1 Location: Right arm, BP Patient Position: At rest) Pulse 68 Temp 98.1 °F (36.7 °C) Resp 18 Ht 5' 7\" (1.702 m) Wt 138.8 kg (306 lb) SpO2 98% BMI 47.93 kg/m² Intake/Output Summary (Last 24 hours) at 11/2/2020 5689 Last data filed at 11/2/2020 0900 Gross per 24 hour Intake 650 ml Output 850 ml Net -200 ml Physical Examination:  
 
Constitutional:  Patient's lying flat in bed without any distress, on room air. Resp: No wheezing/rhonchi/rales. No accessory muscle use Sternal  Small wound site, covered. GI:  Obese. LVAD exit to the epigastric area. Soft, non distended, non tender. normoactive bowel sounds, no hepatosplenomegaly Musculoskeletal:  Purplish erythema on the right lower extremity. Small superficial wound without discharge. Neurologic:  Mental status:AAOx3, Motor exam:Moves all extremities symmetrically Data Review:  
 Review and/or order of clinical lab test 
Review and/or order of tests in the radiology section of CPT Review and/or order of tests in the medicine section of CPT Labs:  
 
Recent Labs 11/02/20 
0342 11/01/20 
0330 WBC 7.6 6.9 HGB 9.4* 9.5* HCT 31.8* 32.1*  
 196 Recent Labs 11/02/20 
0342 11/01/20 
0330 10/31/20 
0510  138 141  
K 4.0 3.8 4.0  
 103 105 CO2 31 31 29 BUN 43* 35* 30* CREA 2.14* 1.82* 1.72* * 148* 172* CA 8.8 9.1 8.7 MG 2.1 2.0 2.1 Recent Labs 11/02/20 
0342 11/01/20 
0330 10/31/20 
0510 ALT 8* 8* 7* AP 89 77 85 TBILI 0.3 0.4 0.4 TP 7.4 7.6 7.5 ALB 3.1* 3.2* 3.1*  
GLOB 4.3* 4.4* 4.4* Recent Labs 11/02/20 
4036 11/01/20 
0344 10/31/20 
0518 INR 2.4* 2.4* 2.3* PTP 24.5* 24.0* 23.5* APTT 50.5* 48.8*  -- No results for input(s): FE, TIBC, PSAT, FERR in the last 72 hours. Lab Results Component Value Date/Time Folate 10.0 10/28/2020 03:56 AM  
  
No results for input(s): PH, PCO2, PO2 in the last 72 hours. Recent Labs 11/02/20 
6652 10/31/20 
1836  63 Lab Results Component Value Date/Time Cholesterol, total 192 10/01/2020 12:00 AM  
 HDL Cholesterol 29 (L) 10/01/2020 12:00 AM  
 LDL Chol Calc (NIH) 123 (H) 10/01/2020 12:00 AM  
 Triglyceride 222 (H) 10/01/2020 12:00 AM  
 
Lab Results Component Value Date/Time Glucose (POC) 238 (H) 11/02/2020 06:19 AM  
 Glucose (POC) 104 (H) 11/01/2020 09:27 PM  
 Glucose (POC) 208 (H) 11/01/2020 04:34 PM  
 Glucose (POC) 261 (H) 11/01/2020 12:13 PM  
 Glucose (POC) 163 (H) 11/01/2020 06:09 AM  
 
No results found for: COLOR, APPRN, SPGRU, REFSG, GENOVEVA, PROTU, GLUCU, Miki Nilo, BILU, UROU, DAVIE, LEUKU, GLUKE, EPSU, BACTU, WBCU, RBCU, CASTS, UCRY Medications Reviewed:  
 
Current Facility-Administered Medications Medication Dose Route Frequency  warfarin (COUMADIN) tablet 4 mg  4 mg Oral ONCE  
 bumetanide (BUMEX) tablet 2 mg  2 mg Oral DAILY  DAPTOmycin (CUBICIN) 550 mg in 0.9% sodium chloride 50 mL IVPB RF formulation  4 mg/kg IntraVENous Q24H  
 amLODIPine (NORVASC) tablet 2.5 mg  2.5 mg Oral BID  magnesium oxide (MAG-OX) tablet 400 mg  400 mg Oral BID  cefepime (MAXIPIME) 2 g in 0.9% sodium chloride (MBP/ADV) 100 mL  2 g IntraVENous Q24H  
 ammonium lactate (LAC-HYDRIN) 12 % lotion   Topical BID  albuterol (PROVENTIL VENTOLIN) nebulizer solution 2.5 mg  2.5 mg Nebulization BID PRN  
 sodium chloride (NS) flush 5-40 mL  5-40 mL IntraVENous Q8H  
 sodium chloride (NS) flush 5-40 mL  5-40 mL IntraVENous PRN  polyethylene glycol (MIRALAX) packet 17 g  17 g Oral DAILY PRN  
 glucose chewable tablet 16 g  4 Tab Oral PRN  
 glucagon (GLUCAGEN) injection 1 mg  1 mg IntraMUSCular PRN  
 sodium chloride (NS) flush 5-40 mL  5-40 mL IntraVENous Q8H  
 sodium chloride (NS) flush 5-40 mL  5-40 mL IntraVENous PRN  
 Warfarin - Pharmacy to Dose   Other Rx Dosing/Monitoring  dextrose 10% infusion 0-250 mL  0-250 mL IntraVENous PRN  
 insulin NPH (NOVOLIN N, HUMULIN N) injection 20 Units  0.15 Units/kg SubCUTAneous BID  insulin lispro (HUMALOG) injection   SubCUTAneous AC&HS  aspirin delayed-release tablet 81 mg  81 mg Oral DAILY  arformoterol 15 mcg/budesonide 0.5 mg neb solution   Nebulization BID RT  
 carvediloL (COREG) tablet 25 mg  25 mg Oral BID WITH MEALS  ergocalciferol capsule 50,000 Units  50,000 Units Oral Q7D  
 ferrous sulfate tablet 325 mg  1 Tab Oral DAILY WITH BREAKFAST  gabapentin (NEURONTIN) capsule 300 mg  300 mg Oral DAILY  hydrOXYzine HCL (ATARAX) tablet 10 mg  10 mg Oral TID PRN  
 icosapent ethyL (VASCEPA) capsule 1 Cap  1 Cap Oral BID WITH MEALS  
 PARoxetine (PAXIL) tablet 30 mg  30 mg Oral DAILY  sacubitriL-valsartan (ENTRESTO)  mg tablet 1 Tab  1 Tab Oral BID  tolterodine (DETROL) tablet 8 mg  8 mg Oral BID  hydrALAZINE (APRESOLINE) 20 mg/mL injection 5 mg  5 mg IntraVENous Q4H PRN  
 acetaminophen (TYLENOL) tablet 650 mg  650 mg Oral Q4H PRN Facility-Administered Medications Ordered in Other Encounters Medication Dose Route Frequency  [START ON 11/3/2020] ferumoxytoL (FERAHEME) 510 mg in 0.9% sodium chloride 100 mL, overfill volume 10 mL IVPB  510 mg IntraVENous ONCE  
 [START ON 11/3/2020] cloNIDine HCL (CATAPRES) tablet 0.1 mg  0.1 mg Oral Q4H PRN  
 [START ON 11/3/2020] diphenhydrAMINE (BENADRYL) capsule 25 mg  25 mg Oral ONCE  
 [START ON 11/3/2020] acetaminophen (TYLENOL) tablet 650 mg  650 mg Oral ONCE  
 
______________________________________________________________________ EXPECTED LENGTH OF STAY: - - - 
ACTUAL LENGTH OF STAY:          6 Meghana Noble MD

## 2020-11-02 NOTE — PROGRESS NOTES
[address] [facility] in [place], [place] Heart Failure Inpatient Progress Note Patient name: Gena King Patient : 1950 Patient MRN: [id number] Date of service: 20 CHIEF COMPLAINT: 
Cellulitis 24Hr Events No acute overnight events PPM interrogated  
  PLAN: 
Continue current device speed 9400rpm 
TTE today Continue current medical therapy for heart failure Continue current dose of coreg 25mg twice daily Continue Entresto 97/103 mg PO BID Continue Norvasc 2.5 mg PO BID No spironolactone due to hyperkalemia Bumex to 2 mg PO daily Magnesium oxide 400mg daily Patient states she has reaction to allopurinol (leg edema), will hold Continue synthroid, TSH & Free T4 at goal 
vitamin D,  high dose weekly x 12 weeks Chronic anticoagulation with coumadin, dosage per pharmacy (goal 2-2.5) Continue baby ASA Patient not taking statin, retrieve medical records for reason Continue CPAP for JED Antibiotics per ID consult/primary team, wound cx with scant GNRs- will need discharge recommendations. DC in the next few days Sternal wound infection per CTS; continue medical management for now DTC consult recs appreciated, HgbA1C -8.4% NPH and SSI per DTC recs Ambulate daily/PT/OT 
RHC tomorrow with CAV Nutrition consult ordered Vaccines pre discharge Note: Needs multiple referrals prior to hospital discharge: Palliative as OP, Endocrine, GI as OP, GYN and urogynecologist, pulmonologist, EP cardiologist 
  
IMPRESSION: 
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 Malinda Collet at [facility]) · C/b delayed extubation due to severe COPD 
· C/b critical illness polyneuropathy · C/b prolonged hospitalization post-LVAD, 55 days · C/b sternal wound infection s/p debridement (by Dr. Malinda Collet) s/p wound vac · C/b sacral ulcer · Would culture positive for Staph aureus, not MRSA · C/b LVAD site drainage, improved S/p CRT-ICD · ICD fired due to electrolyte imbalance () H/o breast cancer () · s/p bilateral mastectomy/chemo and endometrial cancer s/p hysterectomy · Lymphedema of LLE due to cancer treatment Severe COPD with FEV1 50% Depression Atrial fibrillation H/o \"two mini strokes\" S/p fall with hip facture · Right hip hemmiarthroplasty (18) by Dr. Roe Reynolds) · S/p removed hip hardwarare due to pain (4/15/19) COPD severe CKD, stage 3 Hyperkalemia Pulmonary hypertension Cardiac risk factors: · Morbid obesity, Body mass index is 47.93 kg/m². · DM2 insulin dependent · JED on CPAP 
· HL Urinary incontinence, severe · no procedures due to anticoagulation · conservative management with Detrol 4 pills bid Endometrial cancer () HTN 
HL 
  
CARDIAC IMAGING: 
Echo 20- pending Echo (19) LVEF 20-25%, AV opens 1:1, no AR Echo (18) LVEF 10-%, ramps study done, report in [facility] Echo (18) ramp study done, LVEDD 7.1cm LHC (18) 2 vessel disease with 90% OM, 80% PDA, DSA to PDA branch 
TTE (10/27/20) LVEF 15-20% LVIDd 7.26cm RVIDd 2.82cm Tapse 1.14cm 
  
HEMODYNAMICS: 
RHC not done CPEST not done 6MW not done 
  
OTHER IMAGING: 
EGD/C-scope (19) no active bleeding and polyp removed. 
  
FAMILY HISTORY: 
Mother  76 years, diagnosed with DM, HTN, CAD Father  [de-identified]years old, HTN, CAD, MI 
Positive for DM and heart disease in the family, brother with valve replacement 
  
SOCIAL HISTORY: 
Never alcohol, never smoked, , lives alone, no illicit drug use, lives in house, ambulatory status indpendedn, children: daughter, occupation retired Lives alone. 
  
ALLERGIES: benzodiazepines and quetiapine fumerate (lethargy, resp failure to both) 
  
HISTORY OF PRESENT ILLNESS: 
Briefly, Anisha Creston is a 78 y.o. female with multiple comorbidities listed above presents to our clinic requesting transfer of care from [facility] program. 
  
REVIEW OF SYSTEMS: 
General: Denies fever Ear, nose and throat: Denies difficulty hearing, sinus problems, runny nose, post-nasal drip, ringing in ears, mouth sores, loose teeth, ear pain, nosebleeds, sore throate, facial pain or numbess Cardiovascular: see above in the interval history Respiratory: denies cough, wheezing, sputum production, or hemoptysis. Gastrointestinal: Denies heartburn, constipation, intolerance to certain foods, diarrhea, abdominal pain, nausea, vomiting, difficulty swallowing, blood in stool Kidney and bladder: Denies painful urination, frequent urination, or urgency Musculoskeletal: Denies joint pain, muscle weakness Skin and hair: Denies change in hair loss or increase, breast changes Redness and edema, skin breakdown RLE. PHYSICAL EXAM: 
Visit Vitals BP (!) 90/58 (BP 1 Location: Right arm, BP Patient Position: At rest) Pulse 66 Temp 98.3 °F (36.8 °C) Resp 18 Ht 5' 7\" (1.702 m) Wt 306 lb (138.8 kg) SpO2 98% BMI 47.93 kg/m² LVAD Pump Speed (RPM): 9400 Pump Flow (LPM): 4.5 MAP: 72 
PI (Pulsitility Index): 4.6 Power: 5.5  Test: Yes 
Back Up  at Bedside & Labeled: Yes Power Module Test: Yes Driveline Site Care: No 
Driveline Dressing: Clean, Dry, and Intact Outpatient: No 
MAP in Therapeutic Range (Outpatient): Yes Testing Alarms Reviewed: Yes 
Back up SC speed: 9400 Back up Low Speed Limit: 9000 Emergency Equipment with Patient?: Yes Emergency procedures reviewed?: Yes Drive line site inspected?: No 
Drive line intergrity inspected?: Yes Drive line dressing changed?: No 
 
 
General: Patient is morbidly obese in no acute distress, sitting up in chair HEENT: Normocephalic and atraumatic. No scleral icterus. Pupils are equal, round and reactive to light and accomodation. No conjunctival injection. Oropharynx is clear. Neck: Supple. No evidence of thyroid enlargements or lymphadenopathy. JVD: Cannot be appreciated Lungs: Breath sounds are equal and clear bilaterally. No wheezes, rhonchi, or rales. Heart: VAD hum Abdomen: Soft, obese, no mass or tenderness. No organomegaly or hernia. Bowel sounds present. Genitourinary and rectal: deferred Extremities: No cyanosis, clubbing, Redness and edema, skin breakdown RLE; BLE edema 3+, venous stasis Neurologic: No focal sensory or motor deficits are noted. Grossly intact. Psychiatric: Awake, alert and oriented x 3. Appropriate mood and affect. Skin: Warm, dry, no nodules + petechiae on extremities PAST MEDICAL HISTORY: 
Past Medical History:  
Diagnosis Date  Asthma  Cancer ([facility] [address] breast  
 Cancer ([facility] [address]   
 endometrial  
 Congestive heart failure, unspecified  CRI (chronic renal insufficiency)  Depression  Diabetes ([facility] [address]  Hypertension PAST SURGICAL HISTORY: 
Past Surgical History:  
Procedure Laterality Date  HX HERNIA REPAIR    
 HX HYSTERECTOMY  HX MASTECTOMY FAMILY HISTORY: 
History reviewed. No pertinent family history. SOCIAL HISTORY: 
Social History Socioeconomic History  Marital status:  Spouse name: Not on file  Number of children: Not on file  Years of education: Not on file  Highest education level: Not on file Tobacco Use  Smoking status: Never Smoker  Smokeless tobacco: Never Used Substance and Sexual Activity  Alcohol use: Not Currently LABORATORY RESULTS: 
Labs Latest Ref Rng & Units 11/2/2020 11/1/2020 10/31/2020 10/30/2020 10/29/2020 10/28/2020 10/27/2020 WBC 3.6 - 11.0 K/uL 7.6 6.9 7.1 9.1 - 7.0 8.5  
RBC 3.80 - 5.20 M/uL 3.68(L) 3.77(L) 3.77(L) 3.90 - 3.57(L) 3.81 Hemoglobin 11.5 - 16.0 g/dL 9.4(L) 9.5(L) 9.5(L) 9.8(L) - 8. 8(L) 9.5(L) Hematocrit 35.0 - 47.0 % 31. 8(L) 32. 1(L) 32. 0(L) 32. 6(L) - 30. 2(L) 32. 5(L) MCV 80.0 - 99.0 FL 86.4 85.1 84.9 83.6 - 84.6 85.3 Platelets 944 - 631 K/uL 194 196 202 248 - 189 218 Lymphocytes 12 - 49 % - - - - - 15 17 Monocytes 5 - 13 % - - - - - 5 5 Eosinophils 0 - 7 % - - - - - 3 3 Basophils 0 - 1 % - - - - - 0 1 Albumin 3.5 - 5.0 g/dL 3. 1(L) 3. 2(L) 3. 1(L) 2. 9(L) 2. 7(L) 2. 9(L) 3. 3(L) Calcium 8.5 - 10.1 MG/DL 8.8 9.1 8.7 8.9 8. 0(L) 8.5 8.7 Glucose 65 - 100 mg/dL 185(H) 148(H) 172(H) 189(H) 169(H) 245(H) 192(H) BUN 6 - 20 MG/DL 43(H) 35(H) 30(H) 26(H) 26(H) 24(H) 28(H) Creatinine 0.55 - 1.02 MG/DL 2.14(H) 1.82(H) 1.72(H) 1.59(H) 1.64(H) 1.38(H) 1.60(H) Sodium 136 - 145 mmol/L 140 138 141 140 143 142 140 Potassium 3.5 - 5.1 mmol/L 4.0 3.8 4.0 4.1 4.2 4.1 4.2 TSH 0.450 - 4.500 uIU/mL - - - - - - -  
LDH 81 - 246 U/L 321(H) 319(H) 316(H) 330(H) 309(H) - 324(H) Some recent data might be hidden ALLERGY: 
No Known Allergies CURRENT MEDICATIONS: 
 
Current Facility-Administered Medications:  
  warfarin (COUMADIN) tablet 4 mg, 4 mg, Oral, ONCE, Festus Ribeiro MD 
  influenza vaccine 2020-21 (6 mos+)(PF) (FLUARIX/FLULAVAL/FLUZONE QUAD) injection 0.5 mL, 0.5 mL, IntraMUSCular, PRIOR TO DISCHARGE, Braxton Triny B, NP 
  pneumococcal 23-valent (PNEUMOVAX 23) injection 0.5 mL, 0.5 mL, IntraMUSCular, PRIOR TO DISCHARGE, Braxton Triny B, NP 
  insulin NPH (NOVOLIN N, HUMULIN N) injection 25 Units, 25 Units, SubCUTAneous, BID, Braxton Triny B, NP 
  insulin lispro (HUMALOG) injection 6 Units, 6 Units, SubCUTAneous, TIDAC, Braxton, Triny B, NP 
  bumetanide (BUMEX) tablet 2 mg, 2 mg, Oral, DAILY, Jeison Walter NP, 2 mg at 11/02/20 2896   DAPTOmycin (CUBICIN) 550 mg in 0.9% sodium chloride 50 mL IVPB RF formulation, 4 mg/kg, IntraVENous, Q24H, Oly Belle NP, Last Rate: 100 mL/hr at 11/01/20 1635, 550 mg at 11/01/20 1635 
  amLODIPine (NORVASC) tablet 2.5 mg, 2.5 mg, Oral, BID, Rogelio Walter NP, 2.5 mg at 11/02/20 0911 
  magnesium oxide (MAG-OX) tablet 400 mg, 400 mg, Oral, BID, Brad Liang NP, 400 mg at 11/02/20 2343   cefepime (MAXIPIME) 2 g in 0.9% sodium chloride (MBP/ADV) 100 mL, 2 g, IntraVENous, Q24H, Puneet Méndez MD, Last Rate: 200 mL/hr at 11/01/20 1748, 2 g at 11/01/20 1748   ammonium lactate (LAC-HYDRIN) 12 % lotion, , Topical, BID, Oly Belle, TEA 
  albuterol (PROVENTIL VENTOLIN) nebulizer solution 2.5 mg, 2.5 mg, Nebulization, BID PRN, Disha Hawley MD 
  sodium chloride (NS) flush 5-40 mL, 5-40 mL, IntraVENous, Q8H, Disha Hawley MD, 10 mL at 11/02/20 5556   sodium chloride (NS) flush 5-40 mL, 5-40 mL, IntraVENous, PRN, Disha aHwley MD 
  polyethylene glycol (MIRALAX) packet 17 g, 17 g, Oral, DAILY PRN, Dsiha Hawley MD 
  glucose chewable tablet 16 g, 4 Tab, Oral, PRN, Disha Hawley MD 
  glucagon (GLUCAGEN) injection 1 mg, 1 mg, IntraMUSCular, PRN, Disha Hawley MD 
  sodium chloride (NS) flush 5-40 mL, 5-40 mL, IntraVENous, Q8H, Brad OWENS NP, 10 mL at 11/02/20 7612   sodium chloride (NS) flush 5-40 mL, 5-40 mL, IntraVENous, PRN, Brad Liang NP 
  Warfarin - Pharmacy to Dose, , Other, Rx Dosing/Monitoring, Debeb, Teena Snide, MD 
  dextrose 10% infusion 0-250 mL, 0-250 mL, IntraVENous, PRN, Brad Liang NP 
  insulin lispro (HUMALOG) injection, , SubCUTAneous, AC&HS, Brad Liang NP, 3 Units at 11/02/20 7452   aspirin delayed-release tablet 81 mg, 81 mg, Oral, DAILY, Disha Hawley MD, 81 mg at 11/02/20 0911 
  arformoterol 15 mcg/budesonide 0.5 mg neb solution, , Nebulization, BID RT, Disha Hawley MD 
  carvediloL (COREG) tablet 25 mg, 25 mg, Oral, BID WITH MEALS, Felecia Landry MD, 25 mg at 11/02/20 7189   ergocalciferol capsule 50,000 Units, 50,000 Units, Oral, Q7D, Disha Hawley MD, 50,000 Units at 10/29/20 8169   ferrous sulfate tablet 325 mg, 1 Tab, Oral, DAILY WITH BREAKFAST, Disha Hawley MD, 325 mg at 11/02/20 0911 
  gabapentin (NEURONTIN) capsule 300 mg, 300 mg, Oral, DAILY, Disha Hawley MD, 300 mg at 11/02/20 2179   hydrOXYzine HCL (ATARAX) tablet 10 mg, 10 mg, Oral, TID PRN, Disha Hawley MD 
  icosapent ethyL (VASCEPA) capsule 1 Cap, 1 Cap, Oral, BID WITH MEALS, Disha Hawley MD, Stopped at 10/31/20 0800   PARoxetine (PAXIL) tablet 30 mg, 30 mg, Oral, DAILY, Disha Hawley MD, 30 mg at 11/02/20 0795   sacubitriL-valsartan (ENTRESTO)  mg tablet 1 Tab, 1 Tab, Oral, BID, Adrienne ENCARNACION MD, 1 Tab at 11/02/20 8034   tolterodine (DETROL) tablet 8 mg, 8 mg, Oral, BID, Disha Hawley MD, Stopped at 10/31/20 0900   hydrALAZINE (APRESOLINE) 20 mg/mL injection 5 mg, 5 mg, IntraVENous, Q4H PRN, Disha Hawley MD 
  acetaminophen (TYLENOL) tablet 650 mg, 650 mg, Oral, Q4H PRN, Disha Hawley MD 
 
Facility-Administered Medications Ordered in Other Encounters:  
  [START ON 11/3/2020] ferumoxytoL (FERAHEME) 510 mg in 0.9% sodium chloride 100 mL, overfill volume 10 mL IVPB, 510 mg, IntraVENous, ONCE, Lisa Lee MD 
[address] [place] [address]  [START ON 11/3/2020] cloNIDine HCL (CATAPRES) tablet 0.1 mg, 0.1 mg, Oral, Q4H PRN, Lisa Lee MD 
[address] [place] [address]  [START ON 11/3/2020] diphenhydrAMINE (BENADRYL) capsule 25 mg, 25 mg, Oral, ONCE, Lisa Lee MD 
[address] [place] [address]  [START ON 11/3/2020] acetaminophen (TYLENOL) tablet 650 mg, 650 mg, Oral, ONCE, Lisa Lee MD 
 
Thank you for your referral and allowing me to participate in this patient's care. Damian Arboleda, TEA Villasenor [address] [place] [address], [address] Phone: [phone] PATIENT CARE TEAM: 
Patient Care Team: 
Lizette Steven Cheney, NP as PCP - General (Nurse Practitioner) AHF ATTENDING ADDENDUM Patient was seen and examined in person. Data and notes were reviewed. I have discussed and agree with the plan as noted in the NP note above without further additions. Nikita Branch MD PhD 
Beatriz Villasenor [address] [facility]

## 2020-11-02 NOTE — PROGRESS NOTES
Pharmacist Note  Warfarin Dosing Consult provided for this 78 y. o.female to manage warfarin for Atrial Fibrillation, LVAD INR Goal: 2 - 2.5 Home regimen/ tablet size: 4mg qd Drugs that may increase INR: None Drugs that may decrease INR: None Other current anticoagulants/ drugs that may increase bleeding risk: None Risk factors: Age > 65 and Decompensated Heart Failure Daily INR ordered: YES Recent Labs 11/02/20 
4791 11/01/20 
0344 11/01/20 
0330 10/31/20 
0518 10/31/20 
0510 HGB 9.4*  --  9.5*  --  9.5* INR 2.4* 2.4*  --  2.3*  --   
 
Date               INR                  Dose 
10/26  2.9  4 mg  
10/27  --  --  
10/28  2.7  4 mg  
10/29  2.6  4 mg  
10/30  2.4  4.5 mg 
10/31  2.3  4 mg           
11/1  2.4  4 mg    
11/2                2.4                   4 mg Assessment/ Plan: Will order warfarin 4 mg PO x 1 dose. Pharmacy will continue to monitor daily and adjust therapy as indicated. Please contact the pharmacist at x [phone] for outpatient recommendations if needed.

## 2020-11-02 NOTE — CONSULTS
Cardiac Electrophysiology Hospital Consultation Note Referring physician: Dr. Shama De Oliveira, NP Subjective:  
  
Kanu Barger is a 78 y.o. patient who is seen to establish care for management of Nathaniel Collins 82. Admitted 10/27/2020 with RLE cellulitis, venous stasis wound, possible sternal wound infection, & acute on chronic CHF. RV & LV failure, is s/p LVAD in 2017 at [facility].  NYHA III chronic systolic CHF. On appropriate GDMT. ICD check during admission showed normal device function, adequate generator longevity. Complained of chest tightness PTA, HAIRSTON, & lower extremity edema. She denies orthopnea. Receiving daptomycin & cefepime for wound infections. Wound culture showed scant Morganella morganii. 100% ventricular pacing on telemetry. Nuclear amyloid scan pending, repeat echo pending. RHC planned for tomorrow. Anticoagulated with warfarin. Previous: 
Echo (10/27/2020): LVEF 15-20%, severely dilated LV, LVAD present. Dilated LA. Mild MR. Treated for abscess of sternal wound since late . LHC (2018): 2 vessel disease with 90% OM, 80% PDA, DSA to PDA branch S/p PCI approx 2017. Reports history of Plavix resistance. LHC (2016): High grade ramus & small PDA disease, borderline disease of LAD & takeoff of pRCA. Previously followed by [facility] at [facility], now sees Dr. Cem Stuart. Mother with CAD,  age 76. Father  age [de-identified], CAD & MI.  Brother with valve replacement. Problem List  Date Reviewed: 10/28/2020 Codes Class Noted * (Principal) Acute on chronic systolic CHF (congestive heart failure) (HCC) ICD-10-CM: W07.50 ICD-9-CM: 428.23, 428.0  10/27/2020 Obesity, morbid ([facility] [address] ICD-10-CM: E66.01 
ICD-9-CM: 278.01  10/1/2020 Current Facility-Administered Medications Medication Dose Route Frequency Provider Last Rate Last Dose  warfarin (COUMADIN) tablet 4 mg  4 mg Oral ONCE Sonido Salomon MD      
 influenza vaccine 2020-21 (6 mos+)(PF) (FLUARIX/FLULAVAL/FLUZONE QUAD) injection 0.5 mL  0.5 mL IntraMUSCular PRIOR TO DISCHARGE Triny Limon NP      
 pneumococcal 23-valent (PNEUMOVAX 23) injection 0.5 mL  0.5 mL IntraMUSCular PRIOR TO DISCHARGE Triny Lmion NP      
 insulin NPH (NOVOLIN N, HUMULIN N) injection 25 Units  25 Units SubCUTAneous BID Triny Limon NP      
 insulin lispro (HUMALOG) injection 6 Units  6 Units SubCUTAneous TIDAC Triny Limon NP   6 Units at 11/02/20 1231  bumetanide (BUMEX) tablet 2 mg  2 mg Oral DAILY Polliard, Stan Homans, NP   2 mg at 11/02/20 7141  DAPTOmycin (CUBICIN) 550 mg in 0.9% sodium chloride 50 mL IVPB RF formulation  4 mg/kg IntraVENous Q24H Oly Belle  mL/hr at 11/02/20 1241 550 mg at 11/02/20 1241  
 amLODIPine (NORVASC) tablet 2.5 mg  2.5 mg Oral BID Polliard, Stan Homans, NP   2.5 mg at 11/02/20 0911  
 magnesium oxide (MAG-OX) tablet 400 mg  400 mg Oral BID Tucker Hopkins NP   400 mg at 11/02/20 4040  cefepime (MAXIPIME) 2 g in 0.9% sodium chloride (MBP/ADV) 100 mL  2 g IntraVENous Q24H Alec Cantor  mL/hr at 11/01/20 1748 2 g at 11/01/20 1748  ammonium lactate (LAC-HYDRIN) 12 % lotion   Topical BID Oly Belle NP      
 albuterol (PROVENTIL VENTOLIN) nebulizer solution 2.5 mg  2.5 mg Nebulization BID PRN Disha Hawley MD      
 sodium chloride (NS) flush 5-40 mL  5-40 mL IntraVENous Q8H Disha Hawley MD   10 mL at 11/02/20 6298  sodium chloride (NS) flush 5-40 mL  5-40 mL IntraVENous PRN Disha Hawley MD      
 polyethylene glycol (MIRALAX) packet 17 g  17 g Oral DAILY PRN Disha Hawley MD      
 glucose chewable tablet 16 g  4 Tab Oral PRN Disha Halwey MD      
 glucagon (GLUCAGEN) injection 1 mg  1 mg IntraMUSCular PRN Disha Hawley MD      
 sodium chloride (NS) flush 5-40 mL  5-40 mL IntraVENous Q8H Tucker OWENS NP   10 mL at 11/02/20 3822  sodium chloride (NS) flush 5-40 mL  5-40 mL IntraVENous PRN Melvin Martinez, TEA      
 Warfarin - Pharmacy to Dose   Other Rx Dosing/Monitoring James Cabral MD      
 dextrose 10% infusion 0-250 mL  0-250 mL IntraVENous PRN Melvin Martinez NP      
 insulin lispro (HUMALOG) injection   SubCUTAneous AC&HS Melvin Martinez, NP   2 Units at 11/02/20 1231  aspirin delayed-release tablet 81 mg  81 mg Oral DAILY Disha Hawley MD   81 mg at 11/02/20 0911  
 arformoterol 15 mcg/budesonide 0.5 mg neb solution   Nebulization BID RT Disha Hawley MD      
 carvediloL (COREG) tablet 25 mg  25 mg Oral BID WITH MEALS Disha Hawley MD   25 mg at 11/02/20 8919  ergocalciferol capsule 50,000 Units  50,000 Units Oral Q7D Kory ENCARNACION MD   50,000 Units at 10/29/20 1970  ferrous sulfate tablet 325 mg  1 Tab Oral DAILY WITH BREAKFAST Disha Hawley MD   325 mg at 11/02/20 0911  
 gabapentin (NEURONTIN) capsule 300 mg  300 mg Oral DAILY Disha Hawley MD   300 mg at 11/02/20 3487  hydrOXYzine HCL (ATARAX) tablet 10 mg  10 mg Oral TID PRN Reshma Jones MD      
 icosapent ethyL (VASCEPA) capsule 1 Cap  1 Cap Oral BID WITH MEALS Reshma Jones MD   Stopped at 10/31/20 0800  PARoxetine (PAXIL) tablet 30 mg  30 mg Oral DAILY Disha Hawley MD   30 mg at 11/02/20 6054  sacubitriL-valsartan (ENTRESTO)  mg tablet 1 Tab  1 Tab Oral BID Reshma Jones MD   1 Tab at 11/02/20 5257  tolterodine (DETROL) tablet 8 mg  8 mg Oral BID Reshma Jones MD   Stopped at 10/31/20 0900  hydrALAZINE (APRESOLINE) 20 mg/mL injection 5 mg  5 mg IntraVENous Q4H PRN Disha Hawley MD      
 acetaminophen (TYLENOL) tablet 650 mg  650 mg Oral Q4H PRN Disha Hawley A, MD      
 
Facility-Administered Medications Ordered in Other Encounters Medication Dose Route Frequency Provider Last Rate Last Dose  [START ON 11/3/2020] ferumoxytoL [facility]) 510 mg in 0.9% sodium chloride 100 mL, overfill volume 10 mL IVPB  510 mg IntraVENous Melida Fortune MD      
 [START ON 11/3/2020] cloNIDine HCL (CATAPRES) tablet 0.1 mg  0.1 mg Oral Q4H PRN Pushpa Jernigan MD      
 [START ON 11/3/2020] diphenhydrAMINE (BENADRYL) capsule 25 mg  25 mg Oral Melida Fortune MD      
 [START ON 11/3/2020] acetaminophen (TYLENOL) tablet 650 mg  650 mg Oral Melida Fortune MD      
 
No Known Allergies Past Medical History:  
Diagnosis Date  Asthma  Cancer ([facility] [address] breast  
 Cancer ([facility]   
 endometrial  
 Congestive heart failure, unspecified  CRI (chronic renal insufficiency)  Depression  Diabetes ([facility]  Hypertension Past Surgical History:  
Procedure Laterality Date  HX HERNIA REPAIR    
 HX HYSTERECTOMY  HX MASTECTOMY History reviewed. No pertinent family history. Social History Tobacco Use  Smoking status: Never Smoker  Smokeless tobacco: Never Used Substance Use Topics  Alcohol use: Not Currently Review of Systems: Review of all other systems otherwise negative. Constitutional: Negative for fever, chills, weight loss, + malaise/fatigue. HEENT: Negative for nosebleeds, vision changes. Respiratory: Negative for cough, hemoptysis. Cardiovascular: Negative for chest pain, palpitations, orthopnea, claudication, leg swelling, syncope, and PND. Gastrointestinal: Negative for nausea, vomiting, diarrhea, blood in stool and melena. Genitourinary: Negative for dysuria, and hematuria. Musculoskeletal: Negative for myalgias, arthralgia. Skin: Negative for rash. Heme: Does not bleed or bruise easily. Neurological: Negative for speech change and focal weakness Objective:  
 
Visit Vitals BP (!) 90/58 (BP 1 Location: Right arm, BP Patient Position: At rest) Pulse 70 Temp 97.9 °F (36.6 °C) Resp 20 Ht 5' 7\" (1.702 m) Wt 306 lb (138.8 kg) SpO2 98% BMI 47.93 kg/m² Physical Exam:  
Constitutional: Well-developed and well-nourished. No respiratory distress. Head: Normocephalic and atraumatic. Eyes: Pupils are equal, round. ENT: Hearing grossly normal. 
Neck: Supple. No JVD present. Cardiovascular: LVAD hum. Exam reveals no gallop and no friction rub. No murmur heard. Pulmonary/Chest: Effort normal and breath sounds normal. No wheezes. Abdominal: Soft, no tenderness. Morbidly obese. Musculoskeletal: Moves extremities independently. Vasc/lymphatic: + 1 bilateral edema Neurological: Alert,oriented. Skin: Sternal wound, RLE wound with dressing C/D/I. Psychiatric: Normal mood and affect. Behavior is normal. Judgment and thought content normal.   
 
 
Assessment/Plan: Ms. Yosi Andrea has complicated cardiac history, is admitted with bilateral heart failure (NYHA IV on admission), LVEF 15-20%, sternal wound infection, RLE venous stasis wound. [address] with proper function & adequate longevity. She plans to transfer device surveillance to [facility], previously followed at [facility].  We will arrange for outpatient monitoring. LVAD. Continue appropriate GDMT as managed by Dr. Raina Weeks' team. 
 
Continue management of wound infections per Infectious Disease. Continue anticoagulation with warfarin. DAI Bal 9 [facility] 11/02/20 Addendum from EP attending: 
 I have seen, examined patient, and discussed with nurse practitioner, registered nurse, reviewed, updated note and agree with the assessment and plan I have talked to her this pm. She said she is transferring care to [address] [place] [address] Previous EP specialist is Dr Anny Sheridan at [facility] signs as above with LVAD Exam shows regular rhythm Chest wall with right side ICD Assessment and Plan: 
Her [address] ICD was checked today and showed 20% RV pacing and 92% RV pacing with 2 years of battery left NSVT in September and July No shock She has not done remote L[place]itude check before so she agrees to have this set up in office and at home later Thank you for involving me in this patient's care and please call with further concerns or questions. Wenceslao Cardenas M.D. Electrophysiology/Cardiology 9[address] [facility] Vascular [place] [facility], [facility] [address] [place] [address], [address] [place] [address] 
[phone]

## 2020-11-03 NOTE — PROGRESS NOTES
[address] [facility] 904 [facility] [place] in [place], [place] Heart Failure Inpatient Progress Note Patient name: Chandra Lesch Patient : 1950 Patient MRN: [id number] Date of service: 20 CHIEF COMPLAINT: 
Cellulitis 
  
PLAN: 
[address] 11/3 shows CVP 14 mmHg, PA 36/18 mmHg, PCWP 15 mmHg, Sal CI 2.4 Continue current device speed increased to 9600 with low speed limit of 9200 Decrease Coreg to 12.5 mg PO BID due to RV failure Decrease Entresto to 49/51 mg PO BID due to ROCIO, orthostasis Continue Norvasc 2.5 mg PO BID No spironolactone due to hyperkalemia Decrease Bumex to 2 mg PO daily Magnesium oxide 400mg daily, check Mg with labs Patient states she has reaction to allopurinol (leg edema), will hold Continue synthroid, TSH & Free T4 at goal 
vitamin D,  high dose weekly x 12 weeks Chronic anticoagulation with coumadin, dosage per pharmacy (goal 2-2.5) Continue baby ASA Patient not taking statin, retrieve medical records for reason Continue CPAP for JED Antibiotics per ID consult/primary team, wound cx with scant Morganella Sternal wound infection per CTS; continue medical management for now DTC consult recs appreciated, HgbA1C -8.4% NPH and SSI per DTC recs Ambulate daily/PT/OT Nutrition consult ordered GI, urogynecology, and pulmonary consults as OP Note: Needs multiple referrals prior to hospital discharge: GYN and urogynecologist, pulmonologist, ; also patient needs flu shot and [facility] 
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 Moy Rizvi at [facility]) · C/b delayed extubation due to severe COPD 
· C/b critical illness polyneuropathy · C/b prolonged hospitalization post-LVAD, 55 days · C/b sternal wound infection s/p debridement (by Dr. Moy Rizvi) s/p wound vac · C/b sacral ulcer · Would culture positive for Staph aureus, not MRSA · C/b LVAD site drainage, improved S/p CRT-ICD · ICD fired due to electrolyte imbalance () H/o breast cancer () · s/p bilateral mastectomy/chemo and endometrial cancer s/p hysterectomy · Lymphedema of LLE due to cancer treatment Severe COPD with FEV1 50% Depression Atrial fibrillation H/o \"two mini strokes\" S/p fall with hip facture · Right hip hemmiarthroplasty (18) by Dr. Elizabeth Santoyo) · S/p removed hip hardwarare due to pain (4/15/19) COPD severe CKD, stage 3 Hyperkalemia Pulmonary hypertension Cardiac risk factors: · Morbid obesity, Body mass index is 46.2 kg/m². · DM2 insulin dependent · JED on CPAP 
· HL Urinary incontinence, severe · no procedures due to anticoagulation · conservative management with Detrol 4 pills bid Endometrial cancer () HTN 
HL 
  
CARDIAC IMAGING: 
Echo (19) LVEF 20-25%, AV opens 1:1, no AR Echo (18) LVEF 10-%, ramps study done, report [facility] Echo (18) ramp study done, LVEDD 7.1cm LHC (18) 2 vessel disease with 90% OM, 80% PDA, DSA to PDA branch 
TTE (10/27/20) LVEF 15-20% LVIDd 7.26cm RVIDd 2.82cm Tapse 1.14cm 
  
HEMODYNAMICS: 
RHC not done CPEST not done 6MW not done 
  
OTHER IMAGING: 
EGD/C-scope (19) no active bleeding and polyp removed. 
  
FAMILY HISTORY: 
Mother  76 years, diagnosed with DM, HTN, CAD Father  [de-identified]years old, HTN, CAD, MI 
Positive for DM and heart disease in the family, brother with valve replacement 
  
SOCIAL HISTORY: 
Never alcohol, never smoked, , lives alone, no illicit drug use, lives in house, ambulatory status indpendedn, children: daughter, occupation retired Lives alone. 
  
ALLERGIES: benzodiazepines and quetiapine fumerate (lethargy, resp failure to both) 
  
HISTORY OF PRESENT ILLNESS: 
Briefly, Kevin Ryan is a 78 y.o. female with multiple comorbidities listed above presents to our clinic requesting transfer of care from [facility] LVAD program. 
  
REVIEW OF SYSTEMS: 
General: Denies fever Ear, nose and throat: Denies difficulty hearing, sinus problems, runny nose, post-nasal drip, ringing in ears, mouth sores, loose teeth, ear pain, nosebleeds, sore throate, facial pain or numbess Cardiovascular: see above in the interval history Respiratory: denies cough, wheezing, sputum production, or hemoptysis. Gastrointestinal: Denies heartburn, constipation, intolerance to certain foods, diarrhea, abdominal pain, nausea, vomiting, difficulty swallowing, blood in stool Kidney and bladder: Denies painful urination, frequent urination, or urgency Musculoskeletal: Denies joint pain, muscle weakness Skin and hair: Denies change in hair loss or increase, breast changes Redness and edema, skin breakdown RLE. PHYSICAL EXAM: 
Visit Vitals BP (!) 90/58 Pulse 70 Temp 98 °F (36.7 °C) Resp 16 Ht 5' 7\" (1.702 m) Wt 294 lb 15.6 oz (133.8 kg) SpO2 100% BMI 46.20 kg/m² LVAD Pump Speed (RPM): 9400 Pump Flow (LPM): 4.1 MAP: 78 
PI (Pulsitility Index): 3.6 Power: 5.2  Test: Yes 
Back Up  at Bedside & Labeled: Yes Power Module Test: Yes Driveline Site Care: No 
Driveline Dressing: Clean, Dry, and Intact Outpatient: No 
MAP in Therapeutic Range (Outpatient): Yes Testing Alarms Reviewed: Yes 
Back up SC speed: 9400 Back up Low Speed Limit: 9000 Emergency Equipment with Patient?: Yes Emergency procedures reviewed?: Yes Drive line site inspected?: No(covered by dressing) Drive line intergrity inspected?: Yes Drive line dressing changed?: No 
 
 
General: Patient is morbidly obese in no acute distress, sitting up in chair HEENT: Normocephalic and atraumatic. No scleral icterus. Pupils are equal, round and reactive to light and accomodation. No conjunctival injection. Oropharynx is clear. Neck: Supple. No evidence of thyroid enlargements or lymphadenopathy. JVD: Cannot be appreciated Lungs: Breath sounds are equal and clear bilaterally. No wheezes, rhonchi, or rales. Heart: VAD hum Abdomen: Soft, obese, no mass or tenderness. No organomegaly or hernia. Bowel sounds present. Genitourinary and rectal: deferred Extremities: No cyanosis, clubbing, Redness and edema, skin breakdown RLE; BLE edema 3+, venous stasis Neurologic: No focal sensory or motor deficits are noted. Grossly intact. Psychiatric: Awake, alert and oriented x 3. Appropriate mood and affect. Skin: Warm, dry, no nodules + petechiae on extremities PAST MEDICAL HISTORY: 
Past Medical History:  
Diagnosis Date  Asthma  Cancer ([facility] breast  
 Cancer ([facility]   
 endometrial  
 Congestive heart failure, unspecified  CRI (chronic renal insufficiency)  Depression  Diabetes ([facility]  Hypertension PAST SURGICAL HISTORY: 
Past Surgical History:  
Procedure Laterality Date  HX HERNIA REPAIR    
 HX HYSTERECTOMY  HX MASTECTOMY FAMILY HISTORY: 
History reviewed. No pertinent family history. SOCIAL HISTORY: 
Social History Socioeconomic History  Marital status:  Spouse name: Not on file  Number of children: Not on file  Years of education: Not on file  Highest education level: Not on file Tobacco Use  Smoking status: Never Smoker  Smokeless tobacco: Never Used Substance and Sexual Activity  Alcohol use: Not Currently LABORATORY RESULTS: 
Labs Latest Ref Rng & Units 11/3/2020 11/2/2020 11/1/2020 10/31/2020 10/30/2020 10/29/2020 10/28/2020 WBC 3.6 - 11.0 K/uL 6.8 7.6 6.9 7.1 9.1 - 7.0 RBC 3.80 - 5.20 M/uL 3.64(L) 3.68(L) 3.77(L) 3.77(L) 3.90 - 3.57(L) Hemoglobin 11.5 - 16.0 g/dL 9.3(L) 9.4(L) 9.5(L) 9.5(L) 9.8(L) - 8. 8(L) Hematocrit 35.0 - 47.0 % 31. 5(L) 31. 8(L) 32. 1(L) 32. 0(L) 32. 6(L) - 30. 2(L) MCV 80.0 - 99.0 FL 86.5 86.4 85.1 84.9 83.6 - 84.6 Platelets 259 - 856 K/uL 163 194 196 202 248 - 189 Lymphocytes 12 - 49 % - - - - - - 15 Monocytes 5 - 13 % - - - - - - 5 Eosinophils 0 - 7 % - - - - - - 3 Basophils 0 - 1 % - - - - - - 0 Albumin 3.5 - 5.0 g/dL 2. 9(L) 3. 1(L) 3. 2(L) 3. 1(L) 2. 9(L) 2. 7(L) 2. 9(L) Calcium 8.5 - 10.1 MG/DL 8.9 8.8 9.1 8.7 8.9 8. 0(L) 8.5 Glucose 65 - 100 mg/dL 121(H) 185(H) 148(H) 172(H) 189(H) 169(H) 245(H) BUN 6 - 20 MG/DL 47(H) 43(H) 35(H) 30(H) 26(H) 26(H) 24(H) Creatinine 0.55 - 1.02 MG/DL 2.04(H) 2.14(H) 1.82(H) 1.72(H) 1.59(H) 1.64(H) 1.38(H) Sodium 136 - 145 mmol/L 138 140 138 141 140 143 142 Potassium 3.5 - 5.1 mmol/L 5.1 4.0 3.8 4.0 4.1 4.2 4.1 TSH 0.450 - 4.500 uIU/mL - - - - - - -  
LDH 81 - 246 U/L 517(H) 321(H) 319(H) 316(H) 330(H) 309(H) - Some recent data might be hidden ALLERGY: 
No Known Allergies CURRENT MEDICATIONS: 
 
Current Facility-Administered Medications:  
  warfarin (COUMADIN) tablet 4 mg, 4 mg, Oral, ONCE, Festus Ribeiro MD 
  sacubitriL-valsartan (ENTRESTO) 49-51 mg tablet 1 Tab, 1 Tab, Oral, Q12H, Nila Walter Bi, NP 
  influenza vaccine 2020-21 (6 mos+)(PF) (FLUARIX/FLULAVAL/FLUZONE QUAD) injection 0.5 mL, 0.5 mL, IntraMUSCular, PRIOR TO DISCHARGE, Everardo Limonin B, NP 
  pneumococcal 23-valent (PNEUMOVAX 23) injection 0.5 mL, 0.5 mL, IntraMUSCular, PRIOR TO DISCHARGE, Braxton Triny B, NP 
  insulin NPH (NOVOLIN N, HUMULIN N) injection 25 Units, 25 Units, SubCUTAneous, BID, Braxton Triny B, NP, 10 Units at 11/03/20 0923 
  insulin lispro (HUMALOG) injection 6 Units, 6 Units, SubCUTAneous, TIDAC, Braxton, Triny B, NP, 6 Units at 11/03/20 1459   bumetanide (BUMEX) tablet 2 mg, 2 mg, Oral, DAILY, Polliard, Raul Minder, NP, 2 mg at 11/03/20 0566   amLODIPine (NORVASC) tablet 2.5 mg, 2.5 mg, Oral, BID, Lm Walter, NP, 2.5 mg at 11/03/20 1559   magnesium oxide (MAG-OX) tablet 400 mg, 400 mg, Oral, BID, Drucilla Diver, NP, 400 mg at 11/03/20 8072   cefepime (MAXIPIME) 2 g in 0.9% sodium chloride (MBP/ADV) 100 mL, 2 g, IntraVENous, Q24H, Oly Blele, NP, Last Rate: 200 mL/hr at 11/03/20 1524, 2 g at 11/03/20 1524   ammonium lactate (LAC-HYDRIN) 12 % lotion, , Topical, BID, Oly Belle, NP 
  albuterol (PROVENTIL VENTOLIN) nebulizer solution 2.5 mg, 2.5 mg, Nebulization, BID PRN, Disha Hawley MD 
  sodium chloride (NS) flush 5-40 mL, 5-40 mL, IntraVENous, Q8H, Disha Hawley MD, 10 mL at 11/03/20 1501 
  sodium chloride (NS) flush 5-40 mL, 5-40 mL, IntraVENous, PRN, Disha Hawley MD 
  polyethylene glycol (MIRALAX) packet 17 g, 17 g, Oral, DAILY PRN, Disha Hawley MD 
  glucose chewable tablet 16 g, 4 Tab, Oral, PRN, Disha Hawley MD 
  glucagon (GLUCAGEN) injection 1 mg, 1 mg, IntraMUSCular, PRN, Disha Hawley MD 
  sodium chloride (NS) flush 5-40 mL, 5-40 mL, IntraVENous, Q8H, Clara Moy E, NP, 10 mL at 11/03/20 1500 
  sodium chloride (NS) flush 5-40 mL, 5-40 mL, IntraVENous, PRN, uckim Moy, NP 
  Warfarin - Pharmacy to Dose, , Other, Rx Dosing/Monitoring, Ruth Ward MD 
  dextrose 10% infusion 0-250 mL, 0-250 mL, IntraVENous, PRN, Clara Moy NP 
  insulin lispro (HUMALOG) injection, , SubCUTAneous, AC&HS, Clara Moy NP, Stopped at 11/03/20 0730   aspirin delayed-release tablet 81 mg, 81 mg, Oral, DAILY, Disha Hawley MD, 81 mg at 11/03/20 9469   arformoterol 15 mcg/budesonide 0.5 mg neb solution, , Nebulization, BID RT, Disha Hawley MD 
  carvediloL (COREG) tablet 25 mg, 25 mg, Oral, BID WITH MEALS, Disha Hawley MD, 25 mg at 11/03/20 2122   ergocalciferol capsule 50,000 Units, 50,000 Units, Oral, Q7D, Disha Hawley MD, 50,000 Units at 10/29/20 5783   ferrous sulfate tablet 325 mg, 1 Tab, Oral, DAILY WITH BREAKFAST, Disha Hawley MD, 325 mg at 11/03/20 0853 
  gabapentin (NEURONTIN) capsule 300 mg, 300 mg, Oral, DAILY, Disha Hawley MD, 300 mg at 11/03/20 7956   hydrOXYzine HCL (ATARAX) tablet 10 mg, 10 mg, Oral, TID PRN, Disha Hawley MD 
  icosapent ethyL (VASCEPA) capsule 1 Cap, 1 Cap, Oral, BID WITH MEALS, Disha Hawley MD, Stopped at 10/31/20 0800   PARoxetine (PAXIL) tablet 30 mg, 30 mg, Oral, DAILY, Disha Hawley MD, 30 mg at 11/03/20 1220   tolterodine (DETROL) tablet 8 mg, 8 mg, Oral, BID, Disha Hawley MD, Stopped at 10/31/20 0900   hydrALAZINE (APRESOLINE) 20 mg/mL injection 5 mg, 5 mg, IntraVENous, Q4H PRN, Disha Hawley MD 
  acetaminophen (TYLENOL) tablet 650 mg, 650 mg, Oral, Q4H PRN, Disha Hawley MD 
 
Facility-Administered Medications Ordered in Other Encounters:  
  ferumoxytoL (FERAHEME) 510 mg in 0.9% sodium chloride 100 mL, overfill volume 10 mL IVPB, 510 mg, IntraVENous, ONCE, Mainor Augustine MD 
  cloNIDine HCL (CATAPRES) tablet 0.1 mg, 0.1 mg, Oral, Q4H PRN, Mainor Augustine MD 
  diphenhydrAMINE (BENADRYL) capsule 25 mg, 25 mg, Oral, ONCE, Mainor Augustine MD 
  acetaminophen (TYLENOL) tablet 650 mg, 650 mg, Oral, ONCE, Mainor Augustine MD 
 
Thank you for your referral and allowing me to participate in this patient's care. Concepc[place] Cano, TEA [place] [address] [facility] 
[address] [facility], [address] Phone: [phone] Fax: [phone] PATIENT CARE TEAM: 
Patient Care Team: 
Fan Corrales, NP as PCP - General (Nurse Practitioner)

## 2020-11-03 NOTE — PROGRESS NOTES
[address] [place] [address] 180 [facility] in [place], [place] Procedure: Right Heart Cath 
  
Pre Procedure: 5187 Speed: 9400 Flow: 4.6 MAP: 86 
PI: 6.6 Power: 5.5 Post Procedure: 7409 [id number] Flow:4.5 
MAP: 78 
PI:6.6 Power: 5.4 VAD Coordinator managed LVAD equipment during procedure.

## 2020-11-03 NOTE — PROGRESS NOTES
ID Progress Note 
11/3/2020 Subjective:  
Denies any discomfort at present time. Review of Systems: 
         
Symptom Y/N Comments   Symptom Y/N Comments Fever/Chills n      Chest Pain n      
Poor Appetite       Edema Cough       Abdominal Pain Sputum       Joint Pain SOB/HAIRSTON  n     Pruritis/Rash Nausea/vomit  n     Tolerating PT/OT Diarrhea  n     Tolerating Diet Constipation  n     Other Could NOT obtain due to:    
 
Objective:  
 
Vitals:  
Visit Vitals BP (!) 90/58 Pulse 67 Temp 98.1 °F (36.7 °C) Resp 18 Ht 5' 7\" (1.702 m) Wt 133.8 kg (294 lb 15.6 oz) SpO2 98% BMI 46.20 kg/m² Tmax:  Temp (24hrs), Av.1 °F (36.7 °C), Min:97.9 °F (36.6 °C), Max:98.4 °F (36.9 °C) PHYSICAL EXAM: 
General: Morbidly obese. Alert, cooperative, no acute distress EENT:  EOMI. Anicteric sclerae. MMM Resp:  Clear in apex with decreased breath sounds at bases, no wheezing or rales. No accessory muscle use CV:  Regular  rhythm,  trace of pitting edema, VAD hum, Clear drainage from sternal wound; size - base of Q-tip GI:  Soft, Non distended, Non tender. +Bowel sounds Neurologic:  Alert and oriented X 3, normal speech, Psych:   Good insight. Not anxious nor agitated Skin:  bumpy, dusky below knees, right anterior wound packed with nu guaze, diffuse erythema, not tender to touch . No jaundice Labs:  
Lab Results Component Value Date/Time WBC 6.8 2020 05:28 AM  
 HGB 9.3 (L) 2020 05:28 AM  
 HCT 31.5 (L) 2020 05:28 AM  
 PLATELET 023 72/10/5477 05:28 AM  
 MCV 86.5 2020 05:28 AM  
 
Lab Results Component Value Date/Time  Sodium 138 2020 05:28 AM  
 Potassium 5.1 2020 05:28 AM  
 Chloride 102 2020 05:28 AM  
 CO2 32 2020 05:28 AM  
 Anion gap 4 (L) 2020 05:28 AM  
 Glucose 121 (H) 2020 05:28 AM  
 BUN 47 (H) 2020 05:28 AM  
 Creatinine 2.04 (H) 2020 05:28 AM BUN/Creatinine ratio 23 (H) 11/03/2020 05:28 AM  
 GFR est AA 29 (L) 11/03/2020 05:28 AM  
 GFR est non-AA 24 (L) 11/03/2020 05:28 AM  
 Calcium 8.9 11/03/2020 05:28 AM  
 Bilirubin, total 0.3 11/03/2020 05:28 AM  
 Alk. phosphatase 83 11/03/2020 05:28 AM  
 Protein, total 7.4 11/03/2020 05:28 AM  
 Albumin 2.9 (L) 11/03/2020 05:28 AM  
 Globulin 4.5 (H) 11/03/2020 05:28 AM  
 A-G Ratio 0.6 (L) 11/03/2020 05:28 AM  
 ALT (SGPT) 10 (L) 11/03/2020 05:28 AM  
 
Chest CT (10/27/2020): In the midline inferior to the sternum and expected location of the manubrium 
and apparently socially with skin defect is a subcutaneous oval-shaped density 
measuring between 35 and 54 Hounsfield units. This has well-defined margins 
without adjacent stranding. This could represent high density infected fluid. This could represent granulation tissue this could represent a combination of 
both. This does extend down to the level of the left ventricular assist device 
outflow tract anteriorly. There is no associated gas collection. Slightly more 
superiorly and along the anterior aspect of the outflow tract cannula is a small 
triangular area of soft tissue density measuring 54 Hounsfield units that could 
represent granulation tissue or inflammatory reaction. This is not associated 
with fluid density or air density Assessment and Plan Right lower extremity cellulitis in presence of venous stasis Sternal wound infection 
- blood cx (10/27) no growth  
  wound cx from sternal and right lower extremity wound ; RARE DIPHTHEROIDS & Morganella morganii ssp morganii Continue with IV cefepim, IV daptomycin was d/c'd on 11/3 . If there's no future procedure is planned then pt can complete 2 weeks of ABX with Levo 750mg every other day, last dose should be 11/11/2020. Pt's daughter asked about taking the antibiotic therapy after completion of Levofloxacin.  She was told from Dr. Lisa Morin that the pt will be on daily antibiotic therapy to prevent further sternal wound infection. Perhaps, pt can take keflex 500mg twice a day after the completion of Levo as suppressive therapy. However, this will not prevent future infection if she truly has sternal infection that involves LAVD. Cardiology team can contact me at [phone] with specific concerns.  
  
LVAD/ICD Acute on chronic systolic heart failure - cardiology following, s/p cardiac cath 11/3 EF 15-20% 
  
Type III DM with neuropathy - A1C 8.4. continue with insulin therapy Continue with neurontin 
  
ROCIO on CKD 
- creat 2.1->2.0continue to monitor Oly Macias NP

## 2020-11-03 NOTE — PROGRESS NOTES
Pharmacist Note  Warfarin Dosing Consult provided for this 78 y. o.female to manage warfarin for Atrial Fibrillation, LVAD INR Goal: 2 - 2.5 Home regimen/ tablet size: 4mg qd Drugs that may increase INR: None Drugs that may decrease INR: None Other current anticoagulants/ drugs that may increase bleeding risk: None Risk factors: Age > 65 and Decompensated Heart Failure Daily INR ordered: YES Recent Labs 11/03/20 
0528 11/02/20 
0342 11/01/20 
0344 11/01/20 
0330 HGB 9.3* 9.4*  --  9.5* INR 2.6* 2.4* 2.4*  --   
 
Date               INR                  Dose 
10/26  2.9  4 mg  
10/27  --  --  
10/28  2.7  4 mg  
10/29  2.6  4 mg  
10/30  2.4  4.5 mg 
10/31  2.3  4 mg           
11/1  2.4  4 mg    
11/2                2.4                   4 mg  
11/3                2.6                   4 mg Assessment/ Plan: Will order warfarin 4 mg PO x 1 dose. Pharmacy will continue to monitor daily and adjust therapy as indicated. Please contact the pharmacist at x [phone] for outpatient recommendations if needed.

## 2020-11-03 NOTE — TELEPHONE ENCOUNTER
Patient is scheduled to see   Dr. Jamie Marcos   (gynecology/pelvic medicine)   11-19-20  2:50pm  [address]  [phone]  Patient will be seeing him to establish as new patient as well as for urinary incontinence. This information was left on patient's voicemail.

## 2020-11-03 NOTE — PROGRESS NOTES
TRANSFER - IN REPORT: 
 
Verbal report received from randal Kirkland on Elder Johnson  being received from procedure for routine progression of care. Report consisted of patients Situation, Background, Assessment and Recommendations(SBAR). Information from the following report(s) Procedure Summary, MAR, Recent Results and Med Rec Status was reviewed with the receiving clinician. Opportunity for questions and clarification was provided. Assessment completed upon patients arrival to [address] [place] [address] and care assumed. Cardiac Cath Lab Recovery Arrival Note: 
 
Elder Johnson arrived to [facility] recovery area. Patient procedure= RHC. Patient on cardiac monitor, non-invasive blood pressure, SPO2 monitor. On room air. IV  of nacl flush right AC cath site. + blood return, fluids d/c'd. Patient status doing well without problems. Patient is A&Ox 4. Patient reports no complaints. PROCEDURE SITE CHECK: 
 
Procedure site:without any bleeding and or hematoma, no pain/discomfort reported at procedure site. No change in patient status. Continue to monitor patient and status. 1330 right IJ damp with bloody drainage, 15 min hand held compression, redressed with quik clot and gauge dsg and tegaderm 1400 no bleeding noted, right IJ dsg D&I

## 2020-11-03 NOTE — PROGRESS NOTES
TRANSFER - OUT REPORT: 
 
Verbal report given to PADILLA Mendoza(name) on Carly Herndon  being transferred to [facility] (unit) for routine progression of care Report consisted of patients Situation, Background, Assessment and  
Recommendations(SBAR). Information from the following report(s) Procedure Summary was reviewed with the receiving nurse. Lines:  
Peripheral IV 11/02/20 Antecubital (Active) Site Assessment Clean, dry, & intact 11/03/20 7686 Phlebitis Assessment 0 11/03/20 0841 Infiltration Assessment 0 11/03/20 0841 Dressing Status Clean, dry, & intact 11/03/20 0841 Dressing Type Transparent;Tape 11/03/20 0841 Hub Color/Line Status Blue;Capped 11/03/20 0841 Action Taken Open ports on tubing capped 11/03/20 0841 Alcohol Cap Used Yes 11/03/20 0841 Opportunity for questions and clarification was provided. Patient transported with: 
 Monitor Registered Nurse

## 2020-11-03 NOTE — PROGRESS NOTES
Cardiac Cath Lab Procedure Area Arrival Note: 
 
Nataly Murphy arrived to Cardiac Cath Lab, Procedure Area. Patient identifiers verified with NAME and DATE OF BIRTH. Procedure verified with patient. Consent forms verified. Allergies verified. Patient informed of procedure and plan of care. Questions answered with review. Patient voiced understanding of procedure and plan of care. Patient on cardiac monitor, non-invasive blood pressure, SPO2 monitor. On RA. IV of NS on pump at 10 ml/hr. Patient status doing well without problems. Patient is A&Ox 4. Patient reports no pain. Patient medicated during procedure with orders obtained and verified by Dr. Lakesha De La Torre. Refer to patients Cardiac Cath Lab PROCEDURE REPORT for vital signs, assessment, status, and response during procedure, printed at end of case. Printed report on chart or scanned into chart. TRANSFER - OUT REPORT: 
 
Verbal report given to LEON KirklandT on Nataly Murphy being transferred to [facility] recovery for routine progression of care Report consisted of patients Situation, Background, Assessment and  
Recommendations(SBAR). Information from the following report(s) Procedure Summary was reviewed with the receiving nurse. Opportunity for questions and clarification was provided.

## 2020-11-03 NOTE — PROCEDURES
Right Heart Catheterization Preliminary Note Patient: Sandy Son  MRN: [id number]  SSN: [id number] YOB: 1950 Age: 78 y.o. Sex: female Date of Procedure: 11/3/2020 Indications: This is a 78 y. o.  female with LVAD in situ who presents with acute on chronic systolic heart failure. Procedure: 
Informed consent was obtained. Time out was performed. The patient was brought to the cardiac catheterization lab and the right neck prepped and draped in the usual sterile fashion. The skin was infiltrated with lidocaine, and the internal jugular vein accessed using the modified Seldinger technique with ultrasound guidance. . A sheath was advanced without difficulty. A 5 Fr PA catheter was then advanced into the right heart. Hemodynamic measurements were obtained. Cardiac output measurement were obtained. At the termination of the case, the catheter and sheath were removed and a Vascade closure device deployed to facilitate manual pressure until hemostasis was achieved. The patient tolerated the procedure well and was transferred to recovery in stable condition. Blood Loss: Minimal. 
Complications: None Findings:  
RA = 17/16/14 RV = 37/11/17 PA = 36/18/26 PCWP = 17/18/15 PA Sat = 53.8 % Ao Sat = 91 % Hgb = 10.3 mg/dl CO/CI = 4.7/2.0 (Thermo) CO/CI = 5.7/2.4 (Sal) Dori Buck MD 
11/3/2020, 12:55 PM 
 
[facility] Office:   [address] [place] [address] Office: 
[address]    [address] [facility] [address]     [address] [place] [address] P: [id number]    P: [phone] F: [phone]    F: [phone]

## 2020-11-03 NOTE — PROGRESS NOTES
Cardiac Cath Lab Recovery Arrival Note: 
 
 
Cha Jones arrived to Cardiac Cath Lab, Recovery Area. Staff introduced to patient. Patient identifiers verified with NAME and DATE OF BIRTH. Procedure verified with patient. Consent forms reviewed and signed by patient or authorized representative and verified. Allergies verified. Patient and family oriented to department. Patient and family informed of procedure and plan of care. Questions answered with review. Patient prepped for procedure, per orders from physician, prior to arrival. 
 
Patient on cardiac monitor, non-invasive blood pressure, SPO2 monitor. On room air. Patient is A&Ox 4. Patient reports no complaints. Patient in stretcher, in low position, with side rails up, call bell within reach, patient instructed to call if assistance as needed. Patient prep in: [address], [place] [address]. Patient family has pager # [phone] Family in: outside hospital.  
Prep by: Vicki Young RN Pre RHC teaching completed. Attempts to get 2nd IV site blood return unable to thread. Right AC cath 22 G + blood return

## 2020-11-03 NOTE — PROGRESS NOTES
OCCUPATIONAL THERAPY EVALUATION/DISCHARGE Patient: Iraida Cruz (41 y.o. female) Date: 11/3/2020 Primary Diagnosis: Acute on chronic systolic CHF (congestive heart failure) ([facility] [I50.23] Procedure(s) (LRB): 
RIGHT HEART CATH (N/A) Day of Surgery Precautions: none ASSESSMENT Based on the objective data described below, the patient presents at functional baseline and is modified independent for ADLs and mobility using rollator. Patient's daughter present and supportive. Patient has no additional OT needs. Recommend d/c home when medically stable. Current Level of Function (ADLs/self-care): modified independent Functional Outcome Measure: The patient scored 95/100 on the Barthel Index outcome measure which is indicative of ~5% impairment in functional performance. PLAN : 
 
Recommendation for discharge: (in order for the patient to meet his/her long term goals) No skilled occupational therapy/ follow up rehabilitation needs identified at this time. This discharge recommendation: 
Has been made in collaboration with the attending provider and/or case management SUBJECTIVE:  
Patient stated I feel okay.  OBJECTIVE DATA SUMMARY:  
HISTORY:  
Past Medical History:  
Diagnosis Date  Asthma  Cancer ([facility] breast  
 Cancer ([facility]   
 endometrial  
 Congestive heart failure, unspecified  CRI (chronic renal insufficiency)  Depression  Diabetes ([facility]  Hypertension Past Surgical History:  
Procedure Laterality Date  HX HERNIA REPAIR    
 HX HYSTERECTOMY  HX MASTECTOMY Prior Level of Function/Environment/Context: Modified independent. Ambulatory with rollator. Sleeps in recliner chair primarily. Performs sponge bathing standing. Uses AE for LB dressing Expanded or extensive additional review of patient history:  
Home Situation Home Environment: Private residence # Steps to Enter: 1(about 3 inches high) One/Two Story Residence: One story Living Alone: Yes Support Systems: Child(ivania) Patient Expects to be Discharged to[de-identified] Patient room Current DME Used/Available at Home: Walker, rollator, Shower chair, Grab bars( LVAD, Trilogy, adjustable bed) Tub or Shower Type: Shower EXAMINATION OF PERFORMANCE DEFICITS: 
Cognitive/Behavioral Status: 
Neurologic State: Alert Orientation Level: Oriented X4 Cognition: Appropriate decision making; Appropriate for age attention/concentration; Appropriate safety awareness; Follows commands Perception: Appears intact Perseveration: No perseveration noted Safety/Judgement: Awareness of environment; Insight into deficits Skin: visible skin appears intact Edema: none noted Hearing: Auditory Auditory Impairment: None Vision/Perceptual:   
    
    
    
  
    
Acuity: Within Defined Limits;Able to read clock/calendar on wall without difficulty; Able to read employee name badge without difficulty Range of Motion: 
 
AROM: Generally decreased, functional 
  
  
  
  
  
  
  
 
Strength: 
 
Strength: Generally decreased, functional 
  
  
  
  
 
Coordination: 
Coordination: Within functional limits Fine Motor Skills-Upper: Left Intact; Right Intact Gross Motor Skills-Upper: Left Intact; Right Intact Tone & Sensation: 
 
Tone: Normal 
  
  
  
  
  
  
  
 
Balance: 
Sitting: Intact Standing: Intact; With support(rollator) Standing - Static: Good Standing - Dynamic : Good Functional Mobility and Transfers for ADLs: 
Bed Mobility: 
Supine to Sit: Minimum assistance(sleeps in a recliner chair at home) Transfers: 
Stand to Sit: Modified independent Bed to Chair: Modified independent ADL Assessment: 
Feeding: Independent(inferred) Oral Facial Hygiene/Grooming: Modified Independent(inferred) Bathing: Modified independent(inferred) Upper Body Dressing: Modified independent(inferred) Lower Body Dressing: Modified independent(inferred, uses LB AE at home) Toileting: Modified independent(inferred) Patient cleared for mobility s/p C earlier today. Received supine and agreeable. Patient's daughter present and supportive. Patient required minimum assistance for supine-sit but reports she sleeps in a recliner chair at home. Performed LVAD PM> battery switchover independently. Donned starr pack + holster for . Performed sit-stand and ambulated ~20 feet and to chair with modified independence using rollator. Patient reports she is at her baseline with no new concerns for ADLs/ mobility and daughter confirmed. ADL Intervention and task modifications: 
  
 
 Cognitive Retraining Safety/Judgement: Awareness of environment; Insight into deficits Functional Measure: 
Barthel Index: 
 
Bathin Bladder: 10 Bowels: 10 
Groomin Dressing: 10 Feeding: 10 Mobility: 15 
Stairs: 5 Toilet Use: 10 Transfer (Bed to Chair and Back): 15 Total: 95/100 The Barthel ADL Index: Guidelines 1. The index should be used as a record of what a patient does, not as a record of what a patient could do. 2. The main aim is to establish degree of independence from any help, physical or verbal, however minor and for whatever reason. 3. The need for supervision renders the patient not independent. 4. A patient's performance should be established using the best available evidence. Asking the patient, friends/relatives and nurses are the usual sources, but direct observation and common sense are also important. However direct testing is not needed. 5. Usually the patient's performance over the preceding 24-48 hours is important, but occasionally longer periods will be relevant. 6. Middle categories imply that the patient supplies over 50 per cent of the effort. 7. Use of aids to be independent is allowed. Stefan Ponce., Barthel, D.W. (1901). Functional evaluation: the Barthel Index. [address] [facility] (14)2.  
Florentin Deaner isaac FORREST Eagle, Abdias, FABIOLA Arguello (1999). Measuring the change indisability after inpatient rehabilitation; comparison of the responsiveness of the Barthel Index and Functional Noble Measure. Journal of Neurology, Neurosurgery, and Psychiatry, 66(4), 222-298. WILDA Mcarthur, ROSLYN Still, & Joseph Brambila M.A. (2004.) Assessment of post-stroke quality of life in cost-effectiveness studies: The usefulness of the Barthel Index and the EuroQoL-5D. [facility], 13, 486-10 Occupational Therapy Evaluation Charge Determination History Examination Decision-Making LOW Complexity : Brief history review  LOW Complexity : 1-3 performance deficits relating to physical, cognitive , or psychosocial skils that result in activity limitations and / or participation restrictions  MEDIUM Complexity : Patient may present with comorbidities that affect occupational performnce. Miniml to moderate modification of tasks or assistance (eg, physical or verbal ) with assesment(s) is necessary to enable patient to complete evaluation Based on the above components, the patient evaluation is determined to be of the following complexity level: LOW Pain Rating: 
Patient did not report pain Activity Tolerance:  
VSS, good After treatment patient left in no apparent distress:   
Sitting in chair, Call bell within reach and Caregiver / family present COMMUNICATION/EDUCATION:  
The patients plan of care was discussed with: Physical therapist and Registered nurse. Thank you for this referral. 
Santiago Houser OT Time Calculation: 34 mins

## 2020-11-03 NOTE — PROGRESS NOTES
[address] [facility] Adult  Hospitalist Group Hospitalist Progress Note Arlet Johns MD 
Answering service: [phone] OR [phone] from in house phone Date of Service:  11/3/2020 NAME:  King Ginette :  1950 MRN:  [id number] Admission Summary: This is a 78year old female with a significant past medical history of chronic systolic heart failure due to ischemic cardiomyopathy with implanted left ventricular assist device. Other significant PMH:  type 2 diabetes, hypertension, obesity, COPD, depression invasion Interval history / Subjective:  
Patient is seen and examined. Feels well, no acute events overnight, going for RHC this am, cultures growing lasha rosario Assessment & Plan:  
Acute-on-chronic systolic congestive heart failure,, NYHA class IV. Ischemic cardiomyopathy- s/p LVAD 
- Being diuresed with IV Bumex. Continue other cardiac regimen: Carvedilol, Entresto. - Daily weight. Strict I&O- Further management by advanced CHF team. 
- plans for [address] 11/3 Suspect sternal wound infection: Antibiotics as above. Cardiac surgery consulted Cellulitis of RLE: background of venous stasis and small wound - ID following, cefepime and vancomycin 'switched to Dapto due to Cr rise 
- sternum cultures on 10/31: spencer rosario HTN: chronic, stable, Home regimen, PRN BP meds. Monitor Chronic normocytic anemia. Hemoglobin stable between 89, iron/B12/folate wnl Type 2 diabetes with hyperglycemia: SSI- NPH 20 units twice daily. monitor COPD: stable-Continue maintenance inhaled steroids. Bronchodilators as needed. CKD3: avoid nephrotoxics. F/u with nephro op. Cr bumped up, titrating diuretics down Depression, stable. Continue  Paxil Morbid obesityBody mass index is 46.2 kg/m². Counseled on weight loss #. L Labial hematoma: 2cm ovale bruise, likely hematoma from purewick use. Monitor, f/u op with gynecology if not resolving. Code status: Full code DVT prophylaxis: On warfarin Care Plan discussed with: Patient/Family and Nurse Anticipated Disposition: Home w/Family: Greater than 48 hours Hospital Problems  Date Reviewed: 10/28/2020 Codes Class Noted POA * (Principal) Acute on chronic systolic CHF (congestive heart failure) (HCC) ICD-10-CM: S72.44 ICD-9-CM: 428.23, 428.0  10/27/2020 Yes Review of Systems: A comprehensive review of systems was negative except for that written in the HPI. Vital Signs:  
 Last 24hrs VS reviewed since prior progress note. Most recent are: 
Visit Vitals BP (!) 90/58 Pulse 62 Temp 98 °F (36.7 °C) Resp 18 Ht 5' 7\" (1.702 m) Wt 133.8 kg (294 lb 15.6 oz) SpO2 98% BMI 46.20 kg/m² Intake/Output Summary (Last 24 hours) at 11/3/2020 1480 Last data filed at 11/3/2020 8066 Gross per 24 hour Intake 930 ml Output 2050 ml Net -1120 ml Physical Examination:  
 
Constitutional:  Patient's lying flat in bed without any distress, on room air. Morbidly obese Sternal  Small wound site, covered. GI:  Obese. LVAD exit to the epigastric area. Soft, non distended, non tender Musculoskeletal:  signs of chronic venous insufficiency. Neurologic:  Mental status:AAOx3, Motor exam:Moves all extremities symmetrically Data Review:  
 Review and/or order of clinical lab test 
Review and/or order of tests in the radiology section of CPT Review and/or order of tests in the medicine section of CPT Labs:  
 
Recent Labs 11/03/20 
9879 11/02/20 
6673 WBC 6.8 7.6 HGB 9.3* 9.4* HCT 31.5* 31.8*  
 194 Recent Labs 11/03/20 
0528 11/02/20 
0342 11/01/20 
0330  140 138  
K 5.1 4.0 3.8  102 103 CO2 32 31 31 BUN 47* 43* 35* CREA 2.04* 2.14* 1.82* * 185* 148* CA 8.9 8.8 9.1 MG 2.3 2.1 2.0 Recent Labs   11/03/20 
0528 11/02/20 5866 11/01/20 
0330 ALT 10* 8* 8* AP 83 89 77 TBILI 0.3 0.3 0.4 TP 7.4 7.4 7.6 ALB 2.9* 3.1* 3.2*  
GLOB 4.5* 4.3* 4.4* Recent Labs 11/03/20 
6450 11/02/20 
0342 11/01/20 
0344 INR 2.6* 2.4* 2.4* PTP 26.5* 24.5* 24.0* APTT 47.7* 50.5* 48.8* No results for input(s): FE, TIBC, PSAT, FERR in the last 72 hours. Lab Results Component Value Date/Time Folate 10.0 10/28/2020 03:56 AM  
  
No results for input(s): PH, PCO2, PO2 in the last 72 hours. Recent Labs 11/02/20 
4840  Lab Results Component Value Date/Time Cholesterol, total 192 10/01/2020 12:00 AM  
 HDL Cholesterol 29 (L) 10/01/2020 12:00 AM  
 LDL Chol Calc (NIH) 123 (H) 10/01/2020 12:00 AM  
 Triglyceride 222 (H) 10/01/2020 12:00 AM  
 
Lab Results Component Value Date/Time Glucose (POC) 126 (H) 11/03/2020 06:21 AM  
 Glucose (POC) 204 (H) 11/02/2020 10:11 PM  
 Glucose (POC) 190 (H) 11/02/2020 04:44 PM  
 Glucose (POC) 188 (H) 11/02/2020 11:29 AM  
 Glucose (POC) 238 (H) 11/02/2020 06:19 AM  
 
No results found for: COLOR, APPRN, SPGRU, REFSG, GENOVEVA, PROTU, GLUCU, Pascual Noun, BILU, UROU, DAVIE, LEUKU, GLUKE, EPSU, BACTU, WBCU, RBCU, CASTS, UCRY Medications Reviewed:  
 
Current Facility-Administered Medications Medication Dose Route Frequency  influenza vaccine 2020-21 (6 mos+)(PF) (FLUARIX/FLULAVAL/FLUZONE QUAD) injection 0.5 mL  0.5 mL IntraMUSCular PRIOR TO DISCHARGE  pneumococcal 23-valent (PNEUMOVAX 23) injection 0.5 mL  0.5 mL IntraMUSCular PRIOR TO DISCHARGE  insulin NPH (NOVOLIN N, HUMULIN N) injection 25 Units  25 Units SubCUTAneous BID  insulin lispro (HUMALOG) injection 6 Units  6 Units SubCUTAneous TIDAC  bumetanide (BUMEX) tablet 2 mg  2 mg Oral DAILY  DAPTOmycin (CUBICIN) 550 mg in 0.9% sodium chloride 50 mL IVPB RF formulation  4 mg/kg IntraVENous Q24H  
 amLODIPine (NORVASC) tablet 2.5 mg  2.5 mg Oral BID  magnesium oxide (MAG-OX) tablet 400 mg  400 mg Oral BID  cefepime (MAXIPIME) 2 g in 0.9% sodium chloride (MBP/ADV) 100 mL  2 g IntraVENous Q24H  
 ammonium lactate (LAC-HYDRIN) 12 % lotion   Topical BID  albuterol (PROVENTIL VENTOLIN) nebulizer solution 2.5 mg  2.5 mg Nebulization BID PRN  
 sodium chloride (NS) flush 5-40 mL  5-40 mL IntraVENous Q8H  
 sodium chloride (NS) flush 5-40 mL  5-40 mL IntraVENous PRN  polyethylene glycol (MIRALAX) packet 17 g  17 g Oral DAILY PRN  
 glucose chewable tablet 16 g  4 Tab Oral PRN  
 glucagon (GLUCAGEN) injection 1 mg  1 mg IntraMUSCular PRN  
 sodium chloride (NS) flush 5-40 mL  5-40 mL IntraVENous Q8H  
 sodium chloride (NS) flush 5-40 mL  5-40 mL IntraVENous PRN  
 Warfarin - Pharmacy to Dose   Other Rx Dosing/Monitoring  dextrose 10% infusion 0-250 mL  0-250 mL IntraVENous PRN  
 insulin lispro (HUMALOG) injection   SubCUTAneous AC&HS  aspirin delayed-release tablet 81 mg  81 mg Oral DAILY  arformoterol 15 mcg/budesonide 0.5 mg neb solution   Nebulization BID RT  
 carvediloL (COREG) tablet 25 mg  25 mg Oral BID WITH MEALS  ergocalciferol capsule 50,000 Units  50,000 Units Oral Q7D  
 ferrous sulfate tablet 325 mg  1 Tab Oral DAILY WITH BREAKFAST  gabapentin (NEURONTIN) capsule 300 mg  300 mg Oral DAILY  hydrOXYzine HCL (ATARAX) tablet 10 mg  10 mg Oral TID PRN  
 icosapent ethyL (VASCEPA) capsule 1 Cap  1 Cap Oral BID WITH MEALS  
 PARoxetine (PAXIL) tablet 30 mg  30 mg Oral DAILY  sacubitriL-valsartan (ENTRESTO)  mg tablet 1 Tab  1 Tab Oral BID  tolterodine (DETROL) tablet 8 mg  8 mg Oral BID  hydrALAZINE (APRESOLINE) 20 mg/mL injection 5 mg  5 mg IntraVENous Q4H PRN  
 acetaminophen (TYLENOL) tablet 650 mg  650 mg Oral Q4H PRN Facility-Administered Medications Ordered in Other Encounters Medication Dose Route Frequency  ferumoxytoL (FERAHEME) 510 mg in 0.9% sodium chloride 100 mL, overfill volume 10 mL IVPB  510 mg IntraVENous ONCE  cloNIDine HCL (CATAPRES) tablet 0.1 mg  0.1 mg Oral Q4H PRN  
 diphenhydrAMINE (BENADRYL) capsule 25 mg  25 mg Oral ONCE  
 acetaminophen (TYLENOL) tablet 650 mg  650 mg Oral ONCE  
 
______________________________________________________________________ EXPECTED LENGTH OF STAY: - - - 
ACTUAL LENGTH OF STAY:          7 Nevin Matos MD

## 2020-11-03 NOTE — DIABETES MGMT
15[address] [facility] PROGRAM FOR DIABETES HEALTH 
 
DIABETES FOLLOW UP NOTE Presentation Iraida Cruz is a 78 y.o. female who was seen at the outpatient [facility] 1721 for follow of on chronic systolic heart failure. In the clinic she was noted to be in volume overload with concern for cellulitis. She was admitted for IV antibiotics and diuresis. HX: Chronic systolic heart failure s/p LVAD implantation with Heartmate II, Breast Cancer (1992) s/p bilateral mastectomy, endometrial cancer s/p hysterectomy, severe COPD, Depression, A Fib, CVA, s/p R hemiarthroplasty, CKD, pulmonary HTN, Hyperlipidemia, Diabetes DX: RV Failure, volume overload, RLE cellulitis, chronic sternal wound infection TX: ID consult, CT surgery for sternal wound consult, Diuresis Current clinical course has been uncomplicated. Diabetes: Patient has known Type two diabetes, treated with Levemir PTA. A1C 8.4% on admission. Family history positive for diabetes. Consulted by Provider for advanced diabetes nursing assessment and care, specifically related to  
[x] Inpatient management strategy [x] Home management assessment Diabetes-related medical history Acute complications: None Neurological complications Peripheral neuropathy Microvascular disease Nephropathy Macrovascular disease Cerebral vascular accident Diabetes medication history Drug class Currently in use Discontinued Never used Biguanide DDP-4 inhibitor Sulfonylurea Thiazolidinedione GLP-1 RA SGLT-2 inhibitors Basal insulin Levemir 40 units at bedtime Fixed Dose  Combinations Bolus insulin Subjective Admitting A1C 8.4% Glucose 126-204 NPH 25 units BID- 10 units given this am prior to cath 4 units correctional insulin in the past 24 hours Appetite  good GFR 23 
IV antibiotics started, cultures growing blairemagi palmaluz marina [facility]- today Objective Physical exam 
General Alert, oriented and in no acute distress/ill-appearing. Conversant and cooperative. Vital Signs Visit Vitals BP (!) 90/58 Pulse 66 Temp 97.6 °F (36.4 °C) Resp 18 Ht 5' 7\" (1.702 m) Wt 133.8 kg (294 lb 15.6 oz) SpO2 98% BMI 46.20 kg/m² Skin  Warm and dry. Acanthosis noted along neckline. No lipohypertrophy or lipoatrophy noted at injection sites Heart   Regular rate and rhythm. No murmurs, rubs or gallops Lungs  Clear to auscultation without rales or rhonchi Extremities Sacral Wound POA, small healing sternal wound, RLE cellulitis Laboratory Lab Results Component Value Date/Time Hemoglobin A1c 8.4 (H) 10/28/2020 03:30 AM  
 
Lab Results Component Value Date/Time LDL Chol Calc (NIH) 123 (H) 10/01/2020 12:00 AM  
 
Lab Results Component Value Date/Time Creatinine 2.04 (H) 11/03/2020 05:28 AM  
 
Lab Results Component Value Date/Time Sodium 138 11/03/2020 05:28 AM  
 Potassium 5.1 11/03/2020 05:28 AM  
 Chloride 102 11/03/2020 05:28 AM  
 CO2 32 11/03/2020 05:28 AM  
 Anion gap 4 (L) 11/03/2020 05:28 AM  
 Glucose 121 (H) 11/03/2020 05:28 AM  
 BUN 47 (H) 11/03/2020 05:28 AM  
 Creatinine 2.04 (H) 11/03/2020 05:28 AM  
 BUN/Creatinine ratio 23 (H) 11/03/2020 05:28 AM  
 GFR est AA 29 (L) 11/03/2020 05:28 AM  
 GFR est non-AA 24 (L) 11/03/2020 05:28 AM  
 Calcium 8.9 11/03/2020 05:28 AM  
 Bilirubin, total 0.3 11/03/2020 05:28 AM  
 Alk. phosphatase 83 11/03/2020 05:28 AM  
 Protein, total 7.4 11/03/2020 05:28 AM  
 Albumin 2.9 (L) 11/03/2020 05:28 AM  
 Globulin 4.5 (H) 11/03/2020 05:28 AM  
 A-G Ratio 0.6 (L) 11/03/2020 05:28 AM  
 ALT (SGPT) 10 (L) 11/03/2020 05:28 AM  
 
Lab Results Component Value Date/Time ALT (SGPT) 10 (L) 11/03/2020 05:28 AM  
 
 
Factors affecting BG pattern Factor Dose Comments Nutrition: 
Carb-controlled meals 60 grams/meal On cardiac consistent carb diet Drugs: 
IV antibiotics On Zosyn, Vancomycin Pain Sacral pain Infection Sternal wound infection RLE cellulitis Other: CKD GFR 31 Blood glucose pattern Assessment and Plan Nursing Diagnosis Risk for unstable blood glucose pattern Nursing Intervention Domain 3032 Decision-making Support Nursing Interventions Examined current inpatient diabetes control Explored factors facilitating and impeding inpatient management Identified self-management practices impeding diabetes control Explored corrective strategies with patient and responsible inpatient provider Informed patient of rational for insulin strategy while hospitalized Instructed patient in correlation with elevated glucose and wound heeling, A1C and long term complications Evaluation Sandy Son is a 78year old female with uncontrolled diabetes on once daily Levemir with LVAD who is admitted for RV failure, volume overload, RLE cellulitis and work-up for chronic sternal wound infection. A1C on admission is elevated at 8.4% and patient reports once daily glucose monitoring. Glucose certainly impacted by impaired insulin delivery due to perfusion, chronic infection and oral intake. Discussed the impact of elevated glucose on wound healing. It will be essential for her to have glucose control at this time due to current wounds and preventions of additional long term complications. Basal and correctional insulin restarted and glucose near goal of 100-180 in the inpatient setting. Did receive a smaller dose of NPH this morning with eating a late evening snack. May have a transiently elevated glucose this evening as a result. Recommendations Recommend: 1. Adjust basal insulin: NPH 25 units BID (0.2 units/kg/BID). Can continue current dose, even if NPO. 
 
2. Correctional insulin ACHS at resistant sensitivity, start at a glucose of 200 
 
3. Continue low dose bolus insulin. 0.05 units/kg/meal: 6 units Humalog with meals.  Hold if patient consumes less than 50% of carbohydrates on meal tray. Can increase up to 14 units with meals as needed. On Discharge: 
1. Insulin adjustment based on glucose pattern in the inpatient setting. 2. Patient to obtain glucose more frequently than fasting. Please check before meals and bedtime to ensure a rise in glucose throughout the day. A1C goal 7-7.5% 2. Patient needs a FUV with PCP within 1-2 weeks of hospital discharge for ongoing diabetes management. Patient does prefer to be seen by PCP which is reasonable at this time. If patient does not have glucose control at PCP, may benefit from specialty care. Billing Code(s) I personally reviewed chart, notes, data and current medications in the medical record, and examined the patient at bedside before making care recommendations. Thank you for including us in their care. I spent 15 minutes in direct patient care today for this patient. Time includes chart review, face to face with patient and collaboration with interdisciplinary care team. 
 
Shaina Lott, [facility] Program for Diabetes Health Access via [address] [place] [address] [phone]

## 2020-11-04 PROBLEM — I25.10 CORONARY ARTERY DISEASE INVOLVING NATIVE CORONARY ARTERY OF NATIVE HEART WITHOUT ANGINA PECTORIS: Status: ACTIVE | Noted: 2020-01-01

## 2020-11-04 PROBLEM — I87.2 CHRONIC VENOUS INSUFFICIENCY: Status: ACTIVE | Noted: 2020-01-01

## 2020-11-04 PROBLEM — Z99.89 OSA ON CPAP: Status: ACTIVE | Noted: 2020-01-01

## 2020-11-04 PROBLEM — I42.8 NICM (NONISCHEMIC CARDIOMYOPATHY) (HCC): Status: ACTIVE | Noted: 2020-01-01

## 2020-11-04 PROBLEM — L97.521 ULCER OF LEFT FOOT, LIMITED TO BREAKDOWN OF SKIN (HCC): Status: ACTIVE | Noted: 2020-01-01

## 2020-11-04 PROBLEM — G47.33 OSA ON CPAP: Status: ACTIVE | Noted: 2020-01-01

## 2020-11-04 NOTE — DIABETES MGMT
1545 [facility] PROGRAM FOR DIABETES HEALTH 
 
DIABETES FOLLOW UP NOTE Presentation Brisa Vasquez is a 78 y.o. female who was seen at the outpatient [facility] 1721 for follow of on chronic systolic heart failure. In the clinic she was noted to be in volume overload with concern for cellulitis. She was admitted for IV antibiotics and diuresis. HX: Chronic systolic heart failure s/p LVAD implantation with Heartmate II, Breast Cancer (1992) s/p bilateral mastectomy, endometrial cancer s/p hysterectomy, severe COPD, Depression, A Fib, CVA, s/p R hemiarthroplasty, CKD, pulmonary HTN, Hyperlipidemia, Diabetes DX: RV Failure, volume overload, RLE cellulitis, chronic sternal wound infection TX: ID consult, CT surgery for sternal wound consult, Diuresis Current clinical course has been uncomplicated. Diabetes: Patient has known Type two diabetes, treated with Levemir PTA. A1C 8.4% on admission. Family history positive for diabetes. Consulted by Provider for advanced diabetes nursing assessment and care, specifically related to  
[x] Inpatient management strategy [x] Home management assessment Diabetes-related medical history Acute complications: None Neurological complications Peripheral neuropathy Microvascular disease Nephropathy Macrovascular disease Cerebral vascular accident Diabetes medication history Drug class Currently in use Discontinued Never used Biguanide DDP-4 inhibitor Sulfonylurea Thiazolidinedione GLP-1 RA SGLT-2 inhibitors Basal insulin Levemir 40 units at bedtime Fixed Dose  Combinations Bolus insulin Subjective 'I feel so dizzy\" Patient with intermittent runs of PVCs Admitting A1C 8.4% Glucose 126-156 NPH 25 units BID 
6 units Humalog with meals 2 units correctional insulin in the past 24 hours Appetite  good GFR 23 
IV antibiotics started, cultures growing lasha rosario [facility]- yesterday LVAD speeds adjusted Objective Physical exam 
General Alert, oriented and in no acute distress/ill-appearing. Conversant and cooperative. Vital Signs Visit Vitals BP (!) 90/58 Pulse 73 Temp 98.2 °F (36.8 °C) Resp 17 Ht 5' 7\" (1.702 m) Wt 133.4 kg (294 lb 1.5 oz) SpO2 97% BMI 46.06 kg/m² Skin  Warm and dry. Acanthosis noted along neckline. No lipohypertrophy or lipoatrophy noted at injection sites Heart   Regular rate and rhythm. No murmurs, rubs or gallops Lungs  Clear to auscultation without rales or rhonchi Extremities Sacral Wound POA, small healing sternal wound, RLE cellulitis Laboratory Lab Results Component Value Date/Time Hemoglobin A1c 8.4 (H) 10/28/2020 03:30 AM  
 
Lab Results Component Value Date/Time LDL Chol Calc (NIH) 123 (H) 10/01/2020 12:00 AM  
 
Lab Results Component Value Date/Time Creatinine 2.13 (H) 11/04/2020 05:49 AM  
 
Lab Results Component Value Date/Time Sodium 138 11/04/2020 05:49 AM  
 Potassium 4.5 11/04/2020 05:49 AM  
 Chloride 103 11/04/2020 05:49 AM  
 CO2 29 11/04/2020 05:49 AM  
 Anion gap 6 11/04/2020 05:49 AM  
 Glucose 123 (H) 11/04/2020 05:49 AM  
 BUN 52 (H) 11/04/2020 05:49 AM  
 Creatinine 2.13 (H) 11/04/2020 05:49 AM  
 BUN/Creatinine ratio 24 (H) 11/04/2020 05:49 AM  
 GFR est AA 28 (L) 11/04/2020 05:49 AM  
 GFR est non-AA 23 (L) 11/04/2020 05:49 AM  
 Calcium 9.0 11/04/2020 05:49 AM  
 Bilirubin, total 0.3 11/04/2020 05:49 AM  
 Alk. phosphatase 89 11/04/2020 05:49 AM  
 Protein, total 7.3 11/04/2020 05:49 AM  
 Albumin 2.9 (L) 11/04/2020 05:49 AM  
 Globulin 4.4 (H) 11/04/2020 05:49 AM  
 A-G Ratio 0.7 (L) 11/04/2020 05:49 AM  
 ALT (SGPT) 8 (L) 11/04/2020 05:49 AM  
 
Lab Results Component Value Date/Time ALT (SGPT) 8 (L) 11/04/2020 05:49 AM  
 
 
Factors affecting BG pattern Factor Dose Comments Nutrition: 
Carb-controlled meals 60 grams/meal On cardiac consistent carb diet Drugs: 
IV antibiotics On Zosyn, Vancomycin Pain Sacral pain Infection Sternal wound infection RLE cellulitis Other: CKD GFR 31 Blood glucose pattern Assessment and Plan Nursing Diagnosis Risk for unstable blood glucose pattern Nursing Intervention Domain 5255 Decision-making Support Nursing Interventions Examined current inpatient diabetes control Explored factors facilitating and impeding inpatient management Identified self-management practices impeding diabetes control Explored corrective strategies with patient and responsible inpatient provider Informed patient of rational for insulin strategy while hospitalized Instructed patient in correlation with elevated glucose and wound heeling, A1C and long term complications Evaluation Modesto Bustos is a 78year old female with uncontrolled diabetes on once daily Levemir with LVAD who is admitted for RV failure, volume overload, RLE cellulitis and work-up for chronic sternal wound infection. A1C on admission is elevated at 8.4% and patient reports once daily glucose monitoring. Glucose certainly impacted by impaired insulin delivery due to perfusion, chronic infection and oral intake. Discussed the impact of elevated glucose on wound healing. It will be essential for her to have glucose control at this time due to current wounds and preventions of additional long term complications. Basal and correctional insulin restarted and glucose near goal of 100-180 in the inpatient setting. Did receive a smaller dose of NPH this morning with eating a late evening snack. May have a transiently elevated glucose this evening as a result. Recommendations Recommend: 1. Continue basal insulin: NPH 25 units BID (0.2 units/kg/BID). Can continue current dose, even if NPO. 
 
2. Correctional insulin ACHS at resistant sensitivity, start at a glucose of 200 
 
3. Continue low dose bolus insulin.  0.05 units/kg/meal: 6 units Humalog with meals. Hold if patient consumes less than 50% of carbohydrates on meal tray. Can increase up to 14 units with meals as needed. On Discharge: 1. Levemir 25 units BID 2. Patient to obtain glucose more frequently than fasting. Please check before meals and bedtime to ensure a rise in glucose throughout the day. A1C goal 7-7.5% 2. Patient needs a FUV with PCP within 1-2 weeks of hospital discharge for ongoing diabetes management. Patient does prefer to be seen by PCP which is reasonable at this time. If patient does not have glucose control at PCP, may benefit from specialty care. Billing Code(s) I personally reviewed chart, notes, data and current medications in the medical record, and examined the patient at bedside before making care recommendations. Thank you for including us in their care. I spent 15 minutes in direct patient care today for this patient. Time includes chart review, face to face with patient and collaboration with interdisciplinary care team. 
 
Doe Lindsay, [facility] Program for Diabetes Health Access via [address] [place] [address] [phone]

## 2020-11-04 NOTE — PROGRESS NOTES
Problem: Pressure Injury - Risk of 
Goal: *Prevention of pressure injury Description: Document Jaime Scale and appropriate interventions in the flowsheet. Outcome: Progressing Towards Goal 
Note: Pressure Injury Interventions: 
Sensory Interventions: Chair cushion, Discuss PT/OT consult with provider, Keep linens dry and wrinkle-free, Float heels, Maintain/enhance activity level, Minimize linen layers, Pressure redistribution bed/mattress (bed type) Moisture Interventions: Absorbent underpads, Apply protective barrier, creams and emollients, Check for incontinence Q2 hours and as needed, Internal/External urinary devices, Maintain skin hydration (lotion/cream), Minimize layers Activity Interventions: Chair cushion, Increase time out of bed, PT/OT evaluation, Pressure redistribution bed/mattress(bed type) Mobility Interventions: Chair cushion, Float heels, Pressure redistribution bed/mattress (bed type), HOB 30 degrees or less, PT/OT evaluation Nutrition Interventions: Document food/fluid/supplement intake, Offer support with meals,snacks and hydration Friction and Shear Interventions: Apply protective barrier, creams and emollients, HOB 30 degrees or less, Minimize layers Problem: Patient Education: Go to Patient Education Activity Goal: Patient/Family Education Outcome: Progressing Towards Goal 
  
Problem: Heart Failure: Day 5 Goal: Activity/Safety Outcome: Progressing Towards Goal 
Goal: Diagnostic Test/Procedures Outcome: Progressing Towards Goal 
Goal: Nutrition/Diet Outcome: Progressing Towards Goal 
Goal: Discharge Planning Outcome: Progressing Towards Goal 
Goal: Medications Outcome: Progressing Towards Goal 
Goal: Respiratory Outcome: Progressing Towards Goal 
Goal: Treatments/Interventions/Procedures Outcome: Progressing Towards Goal 
Goal: Psychosocial 
Outcome: Progressing Towards Goal 
  
Problem: Falls - Risk of 
Goal: *Absence of Falls Description: Document Veatrice Marker Fall Risk and appropriate interventions in the flowsheet. Outcome: Progressing Towards Goal 
Note: Fall Risk Interventions: 
Mobility Interventions: Patient to call before getting OOB Medication Interventions: Patient to call before getting OOB Elimination Interventions: Patient to call for help with toileting needs Problem: Patient Education: Go to Patient Education Activity Goal: Patient/Family Education Outcome: Progressing Towards Goal

## 2020-11-04 NOTE — TELEPHONE ENCOUNTER
Patient is scheduled to see  Dr. Rosario Clayton (endocrinology)    2-3-21  2:50pm  [address] [place] [address] [address]  [phone]    This information was left on patient's voicemail.

## 2020-11-04 NOTE — PROGRESS NOTES
[facility] VAD/Heart Failure Progress Note Admit Date: 10/27/2020 POD:  1 Day Post-Op Procedure:  Procedure(s): RIGHT HEART CATH Subjective:  
Feels well this am; need to check would at some point Objective:  
Vitals: 
Blood pressure (!) 90/58, pulse 68, temperature 98.1 °F (36.7 °C), resp. rate 16, height 5' 7\" (1.702 m), weight 133.8 kg (294 lb 15.6 oz), SpO2 97 %. Temp (24hrs), Av.2 °F (36.8 °C), Min:97.6 °F (36.4 °C), Max:98.9 °F (37.2 °C) Hemodynamics: 
 CO:   
 CI:   
 CVP:   
 SVR:   
 PAP Systolic:   
 PAP Diastolic:   
 PVR:   
 LP15:   
 SCV02:   
 
VAD Interrogation: LVAD (Heartmate) Pump Speed (RPM): 1066 Pump Flow (LPM): 4.7 PI (Pulsitility Index): 4.4 Power: 5.8 MAP: 84  Test: No 
Back Up  at Bedside & Labeled: Yes Power Module Test: Yes Driveline Site Care: No 
Driveline Dressing: Clean, Dry, and Intact EKG/Rhythm:   
 
Extubation Date / Time:  
 
CT Output:  
 
Ventilator: 
  
 
Oxygen Therapy: 
Oxygen Therapy O2 Sat (%): 97 % (20 2333) Pulse via Oximetry: 65 beats per minute (20 1925) O2 Device: Room air (20 0500) CXR: 
 
Admission Weight: Last Weight Weight: 131 kg (288 lb 12.8 oz) Weight: 133.8 kg (294 lb 15.6 oz) Intake / Output / Drain: 
Current Shift:  1901 -  0700 In: 720 [P.O.:720] Out: 550 [Urine:550] Last 24 hrs.:  
 
Intake/Output Summary (Last 24 hours) at 2020 3701 Last data filed at 2020 0500 Gross per 24 hour Intake 720 ml Output 1900 ml Net -1180 ml Recent Labs 20 
0288  Recent Labs 20 
1844 20 
0528 20 
9971 NA  --  138 140  
K 4.7 5.1 4.0  
CO2  --  32 31 BUN  --  47* 43* CREA  --  2.04* 2.14* GLU  --  121* 185* MG 2.2 2.3 2.1 WBC  --  6.8 7.6 HGB  --  9.3* 9.4* HCT  --  31.5* 31.8* PLT  --  163 194 Recent Labs 20 
0577 20 
5281 INR 2.6* 2.4*  
PTP 26.5* 24.5* APTT 47.7* 50.5* No lab exists for component: PBNP Current Facility-Administered Medications:  
  sacubitriL-valsartan (ENTRESTO) 49-51 mg tablet 1 Tab, 1 Tab, Oral, Q12H, Mario Walter, NP, 1 Tab at 11/03/20 2355   influenza vaccine 2020-21 (6 mos+)(PF) (FLUARIX/FLULAVAL/FLUZONE QUAD) injection 0.5 mL, 0.5 mL, IntraMUSCular, PRIOR TO DISCHARGE, Braxton Triny B, NP 
  pneumococcal 23-valent (PNEUMOVAX 23) injection 0.5 mL, 0.5 mL, IntraMUSCular, PRIOR TO DISCHARGE, Triny Limon B, NP 
  insulin NPH (NOVOLIN N, HUMULIN N) injection 25 Units, 25 Units, SubCUTAneous, BID, Braxton Triny B, NP, 25 Units at 11/03/20 1738   insulin lispro (HUMALOG) injection 6 Units, 6 Units, SubCUTAneous, TIDAC, Braxton Triny B, NP, 6 Units at 11/03/20 1738   bumetanide (BUMEX) tablet 2 mg, 2 mg, Oral, DAILY, Sara Walter NP, 2 mg at 11/03/20 8233   amLODIPine (NORVASC) tablet 2.5 mg, 2.5 mg, Oral, BID, Sara Walter NP, 2.5 mg at 11/03/20 1739 
  magnesium oxide (MAG-OX) tablet 400 mg, 400 mg, Oral, BID, Luke Monteiro NP, Stopped at 11/03/20 1739   cefepime (MAXIPIME) 2 g in 0.9% sodium chloride (MBP/ADV) 100 mL, 2 g, IntraVENous, Q24H, Oly Belle NP, Last Rate: 200 mL/hr at 11/03/20 1524, 2 g at 11/03/20 1524   ammonium lactate (LAC-HYDRIN) 12 % lotion, , Topical, BID, Oly Belle, NP 
  albuterol (PROVENTIL VENTOLIN) nebulizer solution 2.5 mg, 2.5 mg, Nebulization, BID PRN, Marleen, Razaak A, MD 
  sodium chloride (NS) flush 5-40 mL, 5-40 mL, IntraVENous, Q8H, Disha Hawley MD, 10 mL at 11/03/20 1501 
  sodium chloride (NS) flush 5-40 mL, 5-40 mL, IntraVENous, PRN, Disha Hawley MD 
  polyethylene glycol (MIRALAX) packet 17 g, 17 g, Oral, DAILY PRN, Disha Hawley MD 
  glucose chewable tablet 16 g, 4 Tab, Oral, PRN, Disha Hawley MD 
  glucagon (GLUCAGEN) injection 1 mg, 1 mg, IntraMUSCular, PRN, Franki Rodríguez MD 
 sodium chloride (NS) flush 5-40 mL, 5-40 mL, IntraVENous, Q8H, Oral Quentin E, NP, 10 mL at 11/03/20 2355   sodium chloride (NS) flush 5-40 mL, 5-40 mL, IntraVENous, PRN, Oral Alondra, NP 
  Warfarin - Pharmacy to Dose, , Other, Rx Dosing/Monitoring, Kai Ward MD 
  dextrose 10% infusion 0-250 mL, 0-250 mL, IntraVENous, PRN, Oral Alondra, TEA 
  insulin lispro (HUMALOG) injection, , SubCUTAneous, AC&HS, Oral Alondra, NP, Stopped at 11/03/20 2200   aspirin delayed-release tablet 81 mg, 81 mg, Oral, DAILY, Disha Hawley MD, 81 mg at 11/03/20 1400   arformoterol 15 mcg/budesonide 0.5 mg neb solution, , Nebulization, BID RT, Disha Hawley MD 
  carvediloL (COREG) tablet 25 mg, 25 mg, Oral, BID WITH MEALS, Disha Hawley MD, 25 mg at 11/03/20 1738   ergocalciferol capsule 50,000 Units, 50,000 Units, Oral, Q7D, Disha Hawley MD, 50,000 Units at 10/29/20 8700   ferrous sulfate tablet 325 mg, 1 Tab, Oral, DAILY WITH BREAKFAST, Disha Hawley MD, 325 mg at 11/03/20 0853 
  gabapentin (NEURONTIN) capsule 300 mg, 300 mg, Oral, DAILY, Disha Hawley MD, 300 mg at 11/03/20 4632   hydrOXYzine HCL (ATARAX) tablet 10 mg, 10 mg, Oral, TID PRN, Disha Hawley MD 
  icosapent ethyL (VASCEPA) capsule 1 Cap, 1 Cap, Oral, BID WITH MEALS, Disha Hawley MD, Stopped at 10/31/20 0800   PARoxetine (PAXIL) tablet 30 mg, 30 mg, Oral, DAILY, Disha Hawley MD, 30 mg at 11/03/20 9722   tolterodine (DETROL) tablet 8 mg, 8 mg, Oral, BID, Disha Hawley MD, Stopped at 10/31/20 0900   hydrALAZINE (APRESOLINE) 20 mg/mL injection 5 mg, 5 mg, IntraVENous, Q4H PRN, Disha Hawley MD 
  acetaminophen (TYLENOL) tablet 650 mg, 650 mg, Oral, Q4H PRN, Disha Hawley MD 
 
Facility-Administered Medications Ordered in Other Encounters:  
  cloNIDine HCL (CATAPRES) tablet 0.1 mg, 0.1 mg, Oral, Q4H PRN, Heide Maldonado MD 
 
A/P 
  
S/P LVAD - good flows Need for anticoagulation - coumadin Depression - home meds Chronic wound infection - Abx's  
  
Risk of morbidity and mortality - high Medical decision making - high complexity 
  
 
Signed By: Joyce Franz MD

## 2020-11-04 NOTE — PROGRESS NOTES
[facility]WASI-Home RUR-23% Moderate CM continuing to follow patient for transitional care planning needs. Patient remains on IV antibiotics. Will likely discharge home pending further treatment and recommendations. CM to monitor.  
 
Carrington Bhatia MS

## 2020-11-04 NOTE — PROGRESS NOTES
Pharmacist Note  Warfarin Dosing Consult provided for this 78 y. o.female to manage warfarin for Atrial Fibrillation, LVAD INR Goal: 2 - 2.5 Home regimen/ tablet size: 4mg qd Drugs that may increase INR: None Drugs that may decrease INR: None Other current anticoagulants/ drugs that may increase bleeding risk: None Risk factors: Age > 65 and Decompensated Heart Failure Daily INR ordered: YES Recent Labs 11/04/20 
0860 11/03/20 
0528 11/02/20 
0024 HGB 9.2* 9.3* 9.4* INR 2.6* 2.6* 2.4* Date               INR                  Dose 
10/26  2.9  4 mg  
10/27  --  --  
10/28  2.7  4 mg  
10/29  2.6  4 mg  
10/30  2.4  4.5 mg 
10/31  2.3  4 mg           
11/1  2.4  4 mg    
11/2                2.4                   4 mg  
11/3                2.6                   4 mg    
11/4                2.6                    4 mg Assessment/ Plan: Will order warfarin 4 mg PO x 1 dose. Pharmacy will continue to monitor daily and adjust therapy as indicated. Please contact the pharmacist at x [phone] for outpatient recommendations if needed.

## 2020-11-04 NOTE — PROGRESS NOTES
[facility] VAD/Heart Failure Progress Note Admit Date: 10/27/2020 POD:  1 Day Post-Op Procedure:  Procedure(s): RIGHT HEART CATH Subjective: No issues overnight; need to take a look at wound at some point Objective:  
Vitals: 
Blood pressure (!) 90/58, pulse 70, temperature 98.1 °F (36.7 °C), resp. rate 18, height 5' 7\" (1.702 m), weight 294 lb 1.5 oz (133.4 kg), SpO2 98 %. Temp (24hrs), Av.2 °F (36.8 °C), Min:97.6 °F (36.4 °C), Max:98.9 °F (37.2 °C) Hemodynamics: 
 CO:   
 CI:   
 CVP:   
 SVR:   
 PAP Systolic:   
 PAP Diastolic:   
 PVR:   
 CV31:   
 SCV02:   
 
VAD Interrogation: LVAD (Heartmate) Pump Speed (RPM): 0668 Pump Flow (LPM): 5 
PI (Pulsitility Index): 5.7 Power: 6 MAP: 90  Test: Yes 
Back Up  at Bedside & Labeled: Yes Power Module Test: Yes Driveline Site Care: No 
Driveline Dressing: Clean, Dry, and Intact EKG/Rhythm:   
 
Extubation Date / Time:  
 
CT Output:  
 
Ventilator: 
  
 
Oxygen Therapy: 
Oxygen Therapy O2 Sat (%): 98 % (20 1230) Pulse via Oximetry: 69 beats per minute (20 0729) O2 Device: Room air (20 1230) CXR: 
 
Admission Weight: Last Weight Weight: 288 lb 12.8 oz (131 kg) Weight: 294 lb 1.5 oz (133.4 kg) Intake / Yadira Caulk / Drain: 
Current Shift:  0701 -  1900 In: 240 [P.O.:240] Out: 250 [Urine:250] Last 24 hrs.:  
 
Intake/Output Summary (Last 24 hours) at 2020 1417 Last data filed at 2020 3691 Gross per 24 hour Intake 960 ml Output 1300 ml Net -340 ml Recent Labs 20 
0549 20 
7391 CPK 70 135 Recent Labs 20 
7959 20 
1844 20 
0528 20 
4363   --  138 140  
K 4.5 4.7 5.1 4.0  
CO2 29  --  32 31 BUN 52*  --  47* 43* CREA 2.13*  --  2.04* 2.14* *  --  121* 185* MG 2.4 2.2 2.3 2.1 WBC 6.8  --  6.8 7.6 HGB 9.2*  --  9.3* 9.4* HCT 30.8*  --  31.5* 31.8*   --  163 194 Recent Labs 11/04/20 
9953 11/03/20 
0528 11/02/20 
3435 INR 2.6* 2.6* 2.4* PTP 25.8* 26.5* 24.5* APTT 51.8* 47.7* 50.5* No lab exists for component: PBNP Current Facility-Administered Medications:  
  warfarin (COUMADIN) tablet 4 mg, 4 mg, Oral, ONCE, AlmsallamFestus MD 
  carvediloL (COREG) tablet 12.5 mg, 12.5 mg, Oral, BID WITH MEALS, Laxmi Walter NP 
  sacubitriL-valsartan (ENTRESTO) 49-51 mg tablet 1 Tab, 1 Tab, Oral, Q12H, Laxmi Walter NP, 1 Tab at 11/04/20 0816 
  influenza vaccine 2020-21 (6 mos+)(PF) (FLUARIX/FLULAVAL/FLUZONE QUAD) injection 0.5 mL, 0.5 mL, IntraMUSCular, PRIOR TO DISCHARGE, Triny Limon, NP 
  pneumococcal 23-valent (PNEUMOVAX 23) injection 0.5 mL, 0.5 mL, IntraMUSCular, PRIOR TO DISCHARGE, Triny Limon, NP 
  insulin NPH (NOVOLIN N, HUMULIN N) injection 25 Units, 25 Units, SubCUTAneous, BID, Braxton, Triny B, NP, 25 Units at 11/04/20 0815 
  insulin lispro (HUMALOG) injection 6 Units, 6 Units, SubCUTAneous, TIDAC, Triny Limon, NP, 6 Units at 11/04/20 1341 
  amLODIPine (NORVASC) tablet 2.5 mg, 2.5 mg, Oral, BID, Edna Walter Elk T, NP, 2.5 mg at 11/04/20 0816 
  magnesium oxide (MAG-OX) tablet 400 mg, 400 mg, Oral, BID, Chantal Golden [employer], 400 mg at 11/04/20 3699   cefepime (MAXIPIME) 2 g in 0.9% sodium chloride (MBP/ADV) 100 mL, 2 g, IntraVENous, Q24H, Oly Belle NP, Last Rate: 200 mL/hr at 11/03/20 1524, 2 g at 11/03/20 1524   ammonium lactate (LAC-HYDRIN) 12 % lotion, , Topical, BID, Oly Belle, TEA 
  albuterol (PROVENTIL VENTOLIN) nebulizer solution 2.5 mg, 2.5 mg, Nebulization, BID PRN, Disha Hawley MD 
  sodium chloride (NS) flush 5-40 mL, 5-40 mL, IntraVENous, Q8H, Disha Hawley MD, 10 mL at 11/04/20 1342   sodium chloride (NS) flush 5-40 mL, 5-40 mL, IntraVENous, PRN, Disha Hawley MD 
  polyethylene glycol (MIRALAX) packet 17 g, 17 g, Oral, DAILY PRN, Reshma Jones MD 
  glucose chewable tablet 16 g, 4 Tab, Oral, PRN, Disha Hawley MD 
  glucagon (GLUCAGEN) injection 1 mg, 1 mg, IntraMUSCular, PRN, Disha Hawley MD 
  sodium chloride (NS) flush 5-40 mL, 5-40 mL, IntraVENous, Q8H, Melvin Shadow E, NP, 10 mL at 11/04/20 1342   sodium chloride (NS) flush 5-40 mL, 5-40 mL, IntraVENous, PRN, Melvin Martinez, NP 
  Warfarin - Pharmacy to Dose, , Other, Rx Dosing/Monitoring, Larry Ward MD 
  dextrose 10% infusion 0-250 mL, 0-250 mL, IntraVENous, PRN, Melvin Martinez, TEA 
  insulin lispro (HUMALOG) injection, , SubCUTAneous, AC&HS, Melvin Martinez, TEA, Stopped at 11/03/20 2200   aspirin delayed-release tablet 81 mg, 81 mg, Oral, DAILY, Disha Hawley MD, 81 mg at 11/04/20 0816 
  arformoterol 15 mcg/budesonide 0.5 mg neb solution, , Nebulization, BID RT, Disha Hawley MD 
  ergocalciferol capsule 50,000 Units, 50,000 Units, Oral, Q7D, Disha Hawley MD, 50,000 Units at 10/29/20 1411   ferrous sulfate tablet 325 mg, 1 Tab, Oral, DAILY WITH BREAKFAST, Disha Hawley MD, 325 mg at 11/04/20 0816 
  gabapentin (NEURONTIN) capsule 300 mg, 300 mg, Oral, DAILY, Disha Hawley MD, 300 mg at 11/04/20 0931   hydrOXYzine HCL (ATARAX) tablet 10 mg, 10 mg, Oral, TID PRN, Disha Hawley MD 
  icosapent ethyL (VASCEPA) capsule 1 Cap, 1 Cap, Oral, BID WITH MEALS, Disha Hawley MD, Stopped at 10/31/20 0800   PARoxetine (PAXIL) tablet 30 mg, 30 mg, Oral, DAILY, Disha Hawley MD, 30 mg at 11/04/20 0000   tolterodine (DETROL) tablet 8 mg, 8 mg, Oral, BID, Disha Hawley MD, Stopped at 10/31/20 0900   hydrALAZINE (APRESOLINE) 20 mg/mL injection 5 mg, 5 mg, IntraVENous, Q4H PRN, Disha Hawley MD 
  acetaminophen (TYLENOL) tablet 650 mg, 650 mg, Oral, Q4H PRN, Disha Hawley MD 
 
 
A/P 
  
S/P LVAD - good flows Need for anticoagulation - coumadin Depression - home meds Chronic wound infection - Abx's  
  
Risk of morbidity and mortality - high Medical decision making - high complexity Signed By: Aj Shaffer MD

## 2020-11-04 NOTE — PROGRESS NOTES
1930: Bedside and Verbal shift change report given to Bethany Booker (oncoming nurse) by Meghna Rodriguez(offgoing nurse). Report included the following information SBAR, Kardex, Procedure Summary, Intake/Output, MAR, Recent Results and Cardiac Rhythm Paced. 0730: Bedside and Verbal shift change report given to Kath White (oncoming nurse) by Bethany Booker (offgoing nurse). Report included the following information SBAR, Kardex, Procedure Summary, Intake/Output, MAR, Recent Results and Cardiac Rhythm Paced. Problem: Pressure Injury - Risk of 
Goal: *Prevention of pressure injury Description: Document Jaime Scale and appropriate interventions in the flowsheet. Outcome: Progressing Towards Goal 
Note: Pressure Injury Interventions: 
Sensory Interventions: Check visual cues for pain, Avoid rigorous massage over bony prominences, Minimize linen layers, Keep linens dry and wrinkle-free, Maintain/enhance activity level Moisture Interventions: Check for incontinence Q2 hours and as needed, Internal/External urinary devices, Maintain skin hydration (lotion/cream), Offer toileting Q_hr Activity Interventions: Increase time out of bed, Pressure redistribution bed/mattress(bed type), PT/OT evaluation Mobility Interventions: Pressure redistribution bed/mattress (bed type), PT/OT evaluation, Turn and reposition approx. every two hours(pillow and wedges) Nutrition Interventions: Document food/fluid/supplement intake Friction and Shear Interventions: Apply protective barrier, creams and emollients, HOB 30 degrees or less Turns self at appropriate intervals; skin assessed Q4H; blood glucose controlled Problem: Heart Failure: Day 5 Goal: Medications Outcome: Progressing Towards Goal 
Goal: Respiratory Outcome: Progressing Towards Goal 
Goal: Treatments/Interventions/Procedures Outcome: Progressing Towards Goal 
  
Problem: Falls - Risk of 
Goal: *Absence of Falls Description: Document Godfrey Fall Risk and appropriate interventions in the flowsheet. Outcome: Progressing Towards Goal 
Note: Fall Risk Interventions: 
Mobility Interventions: OT consult for ADLs, Patient to call before getting OOB, PT Consult for mobility concerns, Strengthening exercises (ROM-active/passive), Utilize walker, cane, or other assistive device Medication Interventions: Patient to call before getting OOB, Teach patient to arise slowly Elimination Interventions: Call light in reach, Patient to call for help with toileting needs, Stay With Me (per policy) Call bell and personal effects within reach. Bed locked and in low position. Non skid footwear in place.

## 2020-11-04 NOTE — PROGRESS NOTES
0730: Bedside shift change report given to Manpreet Sutton (oncoming nurse) by Tana Esparza (offgoing nurse). Report included the following information SBAR, Kardex, Intake/Output, MAR, Accordion, Recent Results and Cardiac Rhythm paced. 0940: Patient complaining of extreme dizziness when sitting on side of bed. Frequent PVCs and occasional v tach on the monitor. Assisted patient back into bed. HR in 100s. MAP 62.  
 
0945: MAP 58. 
 
1000: LVAD pump speed decreased back to 9400.  
 
1030: MAP retaken. Albumin infusing. MAP 62. Patient states feeling dizzy and unable to complete orthostatics at this time. Less ectopy on monitor. 1115: MAP retaken while attempting to complete orthostatics. MAP while resting is 84. Unable to complete rest of orthostatics at this time d/t transport on unit for nuc med. Will complete orthostatics when returned to unit. 1230: Patient back on unit from nuc med. Orthostatics taken: 
Lying: MAP 96, HR 70 Sitting: MAP 82, HR 71 Standing: MAP 78, HR 89 
 
1720: LVAD dressing changed. Some drainage around driveline with mild redness on surrounding skin. New dressing placed via sterile technique. 1930: Bedside shift change report given to Tana Esparza (oncoming nurse) by Chris Cordoba RN (offgoing nurse). Report included the following information SBAR, Kardex, Intake/Output, MAR, Accordion, Recent Results and Cardiac Rhythm paced. Problem: Pressure Injury - Risk of 
Goal: *Prevention of pressure injury Description: Document Jaime Scale and appropriate interventions in the flowsheet. Outcome: Progressing Towards Goal 
Note: Pressure Injury Interventions: 
Sensory Interventions: Check visual cues for pain, Avoid rigorous massage over bony prominences, Minimize linen layers, Keep linens dry and wrinkle-free, Maintain/enhance activity level Moisture Interventions: Absorbent underpads, Apply protective barrier, creams and emollients, Check for incontinence Q2 hours and as needed, Internal/External urinary devices Activity Interventions: Increase time out of bed Mobility Interventions: Pressure redistribution bed/mattress (bed type) Nutrition Interventions: Document food/fluid/supplement intake Friction and Shear Interventions: HOB 30 degrees or less, Lift sheet, Minimize layers Problem: Patient Education: Go to Patient Education Activity Goal: Patient/Family Education Outcome: Progressing Towards Goal 
  
Problem: Discharge Planning Goal: *Discharge to safe environment Outcome: Progressing Towards Goal 
  
Problem: Heart Failure: Day 5 Goal: Off Pathway (Use only if patient is Off Pathway) Outcome: Progressing Towards Goal 
Goal: Activity/Safety Outcome: Progressing Towards Goal 
Goal: Diagnostic Test/Procedures Outcome: Progressing Towards Goal 
Goal: Nutrition/Diet Outcome: Progressing Towards Goal 
Goal: Discharge Planning Outcome: Progressing Towards Goal 
Goal: Medications Outcome: Progressing Towards Goal 
Goal: Respiratory Outcome: Progressing Towards Goal 
Goal: Treatments/Interventions/Procedures Outcome: Progressing Towards Goal 
Goal: Psychosocial 
Outcome: Progressing Towards Goal 
  
Problem: Falls - Risk of 
Goal: *Absence of Falls Description: Document Nguyen Yen Fall Risk and appropriate interventions in the flowsheet. Outcome: Progressing Towards Goal 
Note: Fall Risk Interventions: 
Mobility Interventions: Communicate number of staff needed for ambulation/transfer, Patient to call before getting OOB Medication Interventions: Patient to call before getting OOB, Teach patient to arise slowly Elimination Interventions: Call light in reach, Patient to call for help with toileting needs, Toileting schedule/hourly rounds Problem: Patient Education: Go to Patient Education Activity Goal: Patient/Family Education Outcome: Progressing Towards Goal

## 2020-11-04 NOTE — PROGRESS NOTES
[address] [facility] [place] [address] [facility] [place] in [place], [place] Heart Failure Inpatient Progress Note Patient name: Shyanne Thompson Patient : 1950 Patient MRN: [id number] Date of service: 20 CHIEF COMPLAINT: 
Cellulitis 
  
PLAN: 
[address] 11/3 shows CVP 14 mmHg, PA 36/18 mmHg, PCWP 15 mmHg, Sal CI 2.4 Continue current device speed increased to 9400 with low speed limit of 9000; intolerant to higher speed due to VT Appreciate Dr. Maribeth Sanchez input re: higher PPM rate (70 bpm) and VT 
PYP strongly suggestive of aTTR amyloidosis; will repeat gammopathy and if negative, will consider addition of tafamidis as OP (will require patient assistance) Decrease Coreg to 12.5 mg PO BID due to RV failure Intolerant of ACEi/ARB/ARNI due to aTTR Continue Norvasc 2.5 mg PO BID No spironolactone due to hyperkalemia Decrease Bumex to 2 mg PO daily Begin hydralazine 10 mg PO TID for MAP control Magnesium oxide 400mg daily, check Mg with labs Patient states she has reaction to allopurinol (leg edema), will hold Continue synthroid, TSH & Free T4 at goal 
vitamin D,  high dose weekly x 12 weeks Chronic anticoagulation with coumadin, dosage per pharmacy (goal 2-2.5) Continue baby ASA Patient not taking statin, retrieve medical records for reason Continue CPAP for JED Antibiotics per ID consult/primary team, wound cx with scant Morganella; plan to d/c on PO levaquin and will require lifelong suppression with Keflex Sternal wound infection per CTS; continue medical management for now DTC consult recs appreciated, HgbA1C -8.4% NPH and SSI per DTC recs Ambulate daily/PT/OT Nutrition consult ordered GI, urogynecology, and pulmonary consults as OP Note: Needs multiple referrals prior to hospital discharge: GYN and urogynecologist, pulmonologist, ; also patient needs flu shot and review records if has pneumonia vaccine at [facility]: 
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 Ludwin Bedolla at [facility]) · C/b delayed extubation due to severe COPD 
· C/b critical illness polyneuropathy · C/b prolonged hospitalization post-LVAD, 55 days · C/b sternal wound infection s/p debridement (by Dr. Ludwin Bedolla) s/p wound vac · C/b sacral ulcer · Would culture positive for Staph aureus, not MRSA · C/b LVAD site drainage, improved S/p CRT-ICD · ICD fired due to electrolyte imbalance (2011) H/o breast cancer (1992) · s/p bilateral mastectomy/chemo and endometrial cancer s/p hysterectomy · Lymphedema of LLE due to cancer treatment Severe COPD with FEV1 50% Depression Atrial fibrillation H/o \"two mini strokes\" S/p fall with hip facture · Right hip hemmiarthroplasty (5/23/18) by Dr. Neena Colmenares) · S/p removed hip hardwarare due to pain (4/15/19) COPD severe CKD, stage 3 Hyperkalemia Pulmonary hypertension Cardiac risk factors: · Morbid obesity, Body mass index is 46.06 kg/m². · DM2 insulin dependent · JED on CPAP 
· HL Urinary incontinence, severe · no procedures due to anticoagulation · conservative management with Detrol 4 pills bid Endometrial cancer (2002) HTN 
HL 
  
CARDIAC IMAGING: 
Echo (9/24/19) LVEF 20-25%, AV opens 1:1, no AR Echo (5/29/18) LVEF 10-%, ramps study done, report in [facility] Echo (1/9/18) ramp study done, LVEDD 7.1cm [facility] (11/9/18) 2 vessel disease with 90% OM, 80% PDA, DSA to PDA branch 
TTE (10/27/20) LVEF 15-20% LVIDd 7.26cm RVIDd 2.82cm Tapse 1.14cm 
  
HEMODYNAMICS: 
RHC not done CPEST not done 6MW not done   
OTHER IMAGING: 
EGD/C-scope (19) no active bleeding and polyp removed. 
  
FAMILY HISTORY: 
Mother  76 years, diagnosed with DM, HTN, CAD Father  [de-identified]years old, HTN, CAD, MI 
Positive for DM and heart disease in the family, brother with valve replacement 
  
SOCIAL HISTORY: 
Never alcohol, never smoked, , lives alone, no illicit drug use, lives in house, ambulatory status indpendedn, children: daughter, occupation retired Lives alone. 
  
ALLERGIES: benzodiazepines and quetiapine fumerate (lethargy, resp failure to both) 
  
HISTORY OF PRESENT ILLNESS: 
Briefly, Tianna Hdz is a 78 y.o. female with multiple comorbidities listed above presents to our clinic requesting transfer of care from [facility] LVAD program. 
  
REVIEW OF SYSTEMS: 
General: Denies fever Ear, nose and throat: Denies difficulty hearing, sinus problems, runny nose, post-nasal drip, ringing in ears, mouth sores, loose teeth, ear pain, nosebleeds, sore throate, facial pain or numbess Cardiovascular: see above in the interval history Respiratory: denies cough, wheezing, sputum production, or hemoptysis. Gastrointestinal: Denies heartburn, constipation, intolerance to certain foods, diarrhea, abdominal pain, nausea, vomiting, difficulty swallowing, blood in stool Kidney and bladder: Denies painful urination, frequent urination, or urgency Musculoskeletal: Denies joint pain, muscle weakness Skin and hair: Denies change in hair loss or increase, breast changes Redness and edema, skin breakdown RLE. PHYSICAL EXAM: 
Visit Vitals BP (!) 90/58 Pulse 70 Temp 98 °F (36.7 °C) Resp 18 Ht 5' 7\" (1.702 m) Wt 294 lb 1.5 oz (133.4 kg) SpO2 97% BMI 46.06 kg/m² LVAD Pump Speed (RPM): (P) 9400 Pump Flow (LPM): 5 MAP: 84 
PI (Pulsitility Index): 5.7 Power: 6  Test: Yes 
Back Up  at Bedside & Labeled: Yes Power Module Test: Yes Driveline Site Care: No 
Driveline Dressing: Clean, Dry, and Intact Outpatient: No 
MAP in Therapeutic Range (Outpatient): Yes Testing Alarms Reviewed: Yes 
Back up SC speed: 9400 Back up Low Speed Limit: 9000 Emergency Equipment with Patient?: Yes Emergency procedures reviewed?: Yes Drive line site inspected?: No(covered by dressing) Drive line intergrity inspected?: Yes Drive line dressing changed?: No 
 
 
General: Patient is morbidly obese in no acute distress, sitting up in chair HEENT: Normocephalic and atraumatic. No scleral icterus. Pupils are equal, round and reactive to light and accomodation. No conjunctival injection. Oropharynx is clear. Neck: Supple. No evidence of thyroid enlargements or lymphadenopathy. JVD: Cannot be appreciated Lungs: Breath sounds are equal and clear bilaterally. No wheezes, rhonchi, or rales. Heart: VAD hum Abdomen: Soft, obese, no mass or tenderness. No organomegaly or hernia. Bowel sounds present. Genitourinary and rectal: deferred Extremities: No cyanosis, clubbing, Redness and edema, skin breakdown RLE; BLE edema 3+, venous stasis Neurologic: No focal sensory or motor deficits are noted. Grossly intact. Psychiatric: Awake, alert and oriented x 3. Appropriate mood and affect. Skin: Warm, dry, no nodules + petechiae on extremities PAST MEDICAL HISTORY: 
Past Medical History:  
Diagnosis Date  Asthma  Cancer ([facility] [address] breast  
 Cancer ([facility] [address]   
 endometrial  
 Congestive heart failure, unspecified  CRI (chronic renal insufficiency)  Depression  Diabetes ([facility] [address]  Hypertension PAST SURGICAL HISTORY: 
Past Surgical History:  
Procedure Laterality Date  HX HERNIA REPAIR    
 HX HYSTERECTOMY  HX MASTECTOMY FAMILY HISTORY: 
History reviewed. No pertinent family history. SOCIAL HISTORY: 
Social History Socioeconomic History  Marital status:  Spouse name: Not on file  Number of children: Not on file  Years of education: Not on file  Highest education level: Not on file Tobacco Use  Smoking status: Never Smoker  Smokeless tobacco: Never Used Substance and Sexual Activity  Alcohol use: Not Currently LABORATORY RESULTS: 
Labs Latest Ref Rng & Units 11/4/2020 11/3/2020 11/3/2020 11/2/2020 11/1/2020 10/31/2020 10/30/2020 WBC 3.6 - 11.0 K/uL 6.8 - 6.8 7.6 6.9 7.1 9.1  
RBC 3.80 - 5.20 M/uL 3.59(L) - 3.64(L) 3.68(L) 3.77(L) 3.77(L) 3.90 Hemoglobin 11.5 - 16.0 g/dL 9.2(L) - 9. 3(L) 9.4(L) 9.5(L) 9.5(L) 9.8(L) Hematocrit 35.0 - 47.0 % 30. 8(L) - 31. 5(L) 31. 8(L) 32. 1(L) 32. 0(L) 32. 6(L) MCV 80.0 - 99.0 FL 85.8 - 86.5 86.4 85.1 84.9 83.6 Platelets 747 - 270 K/uL 173 - 163 194 196 202 248 Lymphocytes 12 - 49 % - - - - - - - Monocytes 5 - 13 % - - - - - - - Eosinophils 0 - 7 % - - - - - - - Basophils 0 - 1 % - - - - - - - Albumin 3.5 - 5.0 g/dL 2. 9(L) - 2. 9(L) 3. 1(L) 3. 2(L) 3. 1(L) 2. 9(L) Calcium 8.5 - 10.1 MG/DL 9.0 - 8.9 8.8 9.1 8.7 8.9 Glucose 65 - 100 mg/dL 123(H) - 121(H) 185(H) 148(H) 172(H) 189(H) BUN 6 - 20 MG/DL 52(H) - 47(H) 43(H) 35(H) 30(H) 26(H) Creatinine 0.55 - 1.02 MG/DL 2.13(H) - 2.04(H) 2.14(H) 1.82(H) 1.72(H) 1.59(H) Sodium 136 - 145 mmol/L 138 - 138 140 138 141 140 Potassium 3.5 - 5.1 mmol/L 4.5 4.7 5.1 4.0 3.8 4.0 4.1 TSH 0.450 - 4.500 uIU/mL - - - - - - -  
LDH 81 - 246 U/L 329(H) - 517(H) 321(H) 319(H) 316(H) 330(H) Some recent data might be hidden ALLERGY: 
No Known Allergies CURRENT MEDICATIONS: 
 
Current Facility-Administered Medications:  
  warfarin (COUMADIN) tablet 4 mg, 4 mg, Oral, ONCE, Festus Ribeiro MD 
  carvediloL (COREG) tablet 12.5 mg, 12.5 mg, Oral, BID WITH MEALS, Saturnino Walter NP, 12.5 mg at 11/04/20 1651   [START ON 11/5/2020] bumetanide (BUMEX) tablet 2 mg, 2 mg, Oral, DAILY, Jose Daniel Walter NP 
  hydrALAZINE (APRESOLINE) tablet 10 mg, 10 mg, Oral, TID, Jose Daniel Walter NP 
  influenza vaccine 2020-21 (6 mos+)(PF) (FLUARIX/FLULAVAL/FLUZONE QUAD) injection 0.5 mL, 0.5 mL, IntraMUSCular, PRIOR TO DISCHARGE, Triny Limon, NP 
  pneumococcal 23-valent (PNEUMOVAX 23) injection 0.5 mL, 0.5 mL, IntraMUSCular, PRIOR TO DISCHARGE, Triny Limon, NP 
  insulin NPH (NOVOLIN N, HUMULIN N) injection 25 Units, 25 Units, SubCUTAneous, BID, Braxton, Triny B, NP, 25 Units at 11/04/20 0815 
  insulin lispro (HUMALOG) injection 6 Units, 6 Units, SubCUTAneous, TIDAC, Everardo Limonin B, NP, 6 Units at 11/04/20 1341 
  amLODIPine (NORVASC) tablet 2.5 mg, 2.5 mg, Oral, BID, Kannan Walter, NP, 2.5 mg at 11/04/20 1652   magnesium oxide (MAG-OX) tablet 400 mg, 400 mg, Oral, BID, Sherif Paredes NP, 400 mg at 11/04/20 8394   cefepime (MAXIPIME) 2 g in 0.9% sodium chloride (MBP/ADV) 100 mL, 2 g, IntraVENous, Q24H, Oly Belle NP, Last Rate: 200 mL/hr at 11/04/20 1533, 2 g at 11/04/20 1533   ammonium lactate (LAC-HYDRIN) 12 % lotion, , Topical, BID, Oly Belle, TEA 
  albuterol (PROVENTIL VENTOLIN) nebulizer solution 2.5 mg, 2.5 mg, Nebulization, BID PRN, Disha Hawley MD 
  sodium chloride (NS) flush 5-40 mL, 5-40 mL, IntraVENous, Q8H, Disha Hawley MD, 10 mL at 11/04/20 1342   sodium chloride (NS) flush 5-40 mL, 5-40 mL, IntraVENous, PRN, Disha Hawley MD 
  polyethylene glycol (MIRALAX) packet 17 g, 17 g, Oral, DAILY PRN, Disha Hawley MD 
  glucose chewable tablet 16 g, 4 Tab, Oral, PRN, Disha Hawley MD 
  glucagon (GLUCAGEN) injection 1 mg, 1 mg, IntraMUSCular, PRN, Disha Hawley MD 
  sodium chloride (NS) flush 5-40 mL, 5-40 mL, IntraVENous, Q8H, Sherif OWENS NP, 10 mL at 11/04/20 1342   sodium chloride (NS) flush 5-40 mL, 5-40 mL, IntraVENous, PRN, Sherif Paredes NP 
  Warfarin - Pharmacy to Dose, , Other, Rx Dosing/Monitoring, Madelyn Ward MD 
  dextrose 10% infusion 0-250 mL, 0-250 mL, IntraVENous, PRN, Sherif Paredes NP 
  insulin lispro (HUMALOG) injection, , SubCUTAneous, AC&HS, Chantelle Solano NP, Stopped at 11/03/20 2200   aspirin delayed-release tablet 81 mg, 81 mg, Oral, DAILY, Disha Hawley MD, 81 mg at 11/04/20 0816 
  arformoterol 15 mcg/budesonide 0.5 mg neb solution, , Nebulization, BID RT, Disha Hawley MD 
  ergocalciferol capsule 50,000 Units, 50,000 Units, Oral, Q7D, Disha Hawley MD, 50,000 Units at 10/29/20 9525   ferrous sulfate tablet 325 mg, 1 Tab, Oral, DAILY WITH BREAKFAST, Disha Hawley MD, 325 mg at 11/04/20 0816 
  gabapentin (NEURONTIN) capsule 300 mg, 300 mg, Oral, DAILY, Disha Hawley MD, 300 mg at 11/04/20 8866   hydrOXYzine HCL (ATARAX) tablet 10 mg, 10 mg, Oral, TID PRN, Disha Hawley MD 
  icosapent ethyL (VASCEPA) capsule 1 Cap, 1 Cap, Oral, BID WITH MEALS, Disha Hawley MD, Stopped at 10/31/20 0800   PARoxetine (PAXIL) tablet 30 mg, 30 mg, Oral, DAILY, Disha Hawley MD, 30 mg at 11/04/20 9919   tolterodine (DETROL) tablet 8 mg, 8 mg, Oral, BID, Disha Hawley MD, Stopped at 10/31/20 0900   hydrALAZINE (APRESOLINE) 20 mg/mL injection 5 mg, 5 mg, IntraVENous, Q4H PRN, Disha Hawley MD 
  acetaminophen (TYLENOL) tablet 650 mg, 650 mg, Oral, Q4H PRN, Disha Hawley MD 
 
Thank you for your referral and allowing me to participate in this patient's care. TEA Baumann 0[address], [address] Phone: [phone] Fax: [phone] PATIENT CARE TEAM: 
Patient Care Team: 
Sujey Champion NP as PCP - General (Nurse Practitioner) AHF ATTENDING ADDENDUM Patient was seen and examined in person. Data and notes were reviewed. I have discussed and agree with the plan as noted in the NP note above without further additions. Margot Shafer MD PhD 
Beatriz Villasenor [phone] [facility]

## 2020-11-04 NOTE — PROGRESS NOTES
0730: Bedside shift change report given to Ana Tim RN (oncoming nurse) by Antonio Dickerson (offgoing nurse). Report included the following information SBAR, Kardex, Intake/Output, MAR, Accordion, Recent Results and Cardiac Rhythm paced. 0945: TRANSFER - OUT REPORT: 
 
Verbal report given to Lakisha Desouza (name) on Shyanne Thompson  being transferred to cath lab (unit) for ordered procedure Report consisted of patients Situation, Background, Assessment and  
Recommendations(SBAR). Information from the following report(s) SBAR, Kardex, Intake/Output, MAR, Accordion, Recent Results and Cardiac Rhythm paced was reviewed with the receiving nurse. Lines:  
Peripheral IV 11/02/20 Antecubital (Active) Site Assessment Clean, dry, & intact 11/03/20 3940 Phlebitis Assessment 0 11/03/20 0841 Infiltration Assessment 0 11/03/20 0841 Dressing Status Clean, dry, & intact 11/03/20 0841 Dressing Type Transparent;Tape 11/03/20 0841 Hub Color/Line Status Blue;Capped 11/03/20 0841 Action Taken Open ports on tubing capped 11/03/20 0841 Alcohol Cap Used Yes 11/03/20 0841 Opportunity for questions and clarification was provided. Patient transported with: 
 Monitor Registered Nurse 1415: TRANSFER - IN REPORT: 
 
Verbal report received from Kylee CAUSEY (name) on Shyanne Thompson  being received from cath lab (unit) for routine progression of care Report consisted of patients Situation, Background, Assessment and  
Recommendations(SBAR). Information from the following report(s) SBAR, Kardex, Procedure Summary, Intake/Output, MAR, Accordion, Recent Results and Cardiac Rhythm paced was reviewed with the receiving nurse. Opportunity for questions and clarification was provided. Assessment completed upon patients arrival to unit and care assumed. 1645: RT attempting to get blood for stat labs. 1715: Not enough blood in tube. Will follow up with RT to attempt again.  
 
1845: RT able to get blood. Sending lab down. 1930: Bedside shift change report given to [address] [place] [address] (oncoming nurse) by Nancy Levy RN (offgoing nurse). Report included the following information SBAR, Kardex, Intake/Output, MAR, Accordion, Recent Results and Cardiac Rhythm paced.

## 2020-11-04 NOTE — PROGRESS NOTES
ID Progress Note 
2020 Subjective:  
Denies any discomfort at present time. She felt dizzy earlier in the morning when she got up, but the symptoms have been resolved during our visit. Review of Systems: 
         
Symptom Y/N Comments   Symptom Y/N Comments Fever/Chills n      Chest Pain n      
Poor Appetite       Edema Cough       Abdominal Pain Sputum       Joint Pain SOB/HAIRSTON  n     Pruritis/Rash Nausea/vomit  n     Tolerating PT/OT Diarrhea  n     Tolerating Diet Constipation  n     Other Could NOT obtain due to:    
 
Objective:  
 
Vitals:  
Visit Vitals BP (!) 90/58 Pulse 68 Temp 98.1 °F (36.7 °C) Resp 16 Ht 5' 7\" (1.702 m) Wt 133.4 kg (294 lb 1.5 oz) SpO2 96% BMI 46.06 kg/m² Tmax:  Temp (24hrs), Av.2 °F (36.8 °C), Min:97.6 °F (36.4 °C), Max:98.9 °F (37.2 °C) PHYSICAL EXAM: 
General: Morbidly obese. Alert, cooperative, no acute distress EENT:  EOMI. Anicteric sclerae. MMM Resp:  Clear in apex with decreased breath sounds at bases, no wheezing or rales. No accessory muscle use CV:  Regular  rhythm,  trace of pitting edema, VAD hum, Clear drainage from sternal wound; size - base of Q-tip GI:  Soft, Non distended, Non tender. +Bowel sounds Neurologic:  Alert and oriented X 3, normal speech, Psych:   Good insight. Not anxious nor agitated Skin:  bumpy, dusky below knees, right anterior wound packed with nu guaze, diffuse erythema, not tender to touch . No jaundice Labs:  
Lab Results Component Value Date/Time WBC 6.8 2020 05:49 AM  
 HGB 9.2 (L) 2020 05:49 AM  
 HCT 30.8 (L) 2020 05:49 AM  
 PLATELET 720  05:49 AM  
 MCV 85.8 2020 05:49 AM  
 
Lab Results Component Value Date/Time  Sodium 138 2020 05:49 AM  
 Potassium 4.5 2020 05:49 AM  
 Chloride 103 2020 05:49 AM  
 CO2 29 2020 05:49 AM  
 Anion gap 6 2020 05:49 AM  
 Glucose 123 (H) 11/04/2020 05:49 AM  
 BUN 52 (H) 11/04/2020 05:49 AM  
 Creatinine 2.13 (H) 11/04/2020 05:49 AM  
 BUN/Creatinine ratio 24 (H) 11/04/2020 05:49 AM  
 GFR est AA 28 (L) 11/04/2020 05:49 AM  
 GFR est non-AA 23 (L) 11/04/2020 05:49 AM  
 Calcium 9.0 11/04/2020 05:49 AM  
 Bilirubin, total 0.3 11/04/2020 05:49 AM  
 Alk. phosphatase 89 11/04/2020 05:49 AM  
 Protein, total 7.3 11/04/2020 05:49 AM  
 Albumin 2.9 (L) 11/04/2020 05:49 AM  
 Globulin 4.4 (H) 11/04/2020 05:49 AM  
 A-G Ratio 0.7 (L) 11/04/2020 05:49 AM  
 ALT (SGPT) 8 (L) 11/04/2020 05:49 AM  
 
Chest CT (10/27/2020): In the midline inferior to the sternum and expected location of the manubrium 
and apparently socially with skin defect is a subcutaneous oval-shaped density 
measuring between 35 and 54 Hounsfield units. This has well-defined margins 
without adjacent stranding. This could represent high density infected fluid. This could represent granulation tissue this could represent a combination of 
both. This does extend down to the level of the left ventricular assist device 
outflow tract anteriorly. There is no associated gas collection. Slightly more 
superiorly and along the anterior aspect of the outflow tract cannula is a small 
triangular area of soft tissue density measuring 54 Hounsfield units that could 
represent granulation tissue or inflammatory reaction. This is not associated 
with fluid density or air density Assessment and Plan Right lower extremity cellulitis in presence of venous stasis Sternal wound infection 
- blood cx (10/27) no growth  
  wound cx from sternal and right lower extremity wound ; RARE DIPHTHEROIDS & Morganella morganii ssp morganii Continue with IV cefepim, 
  IV daptomycin was d/c'd on 11/3 . If there's no future procedure is planned then pt can complete 2 weeks of ABX with Levo 750mg every other day, last dose should be 11/11/2020.   
 
  Pt's daughter asked about taking the antibiotic therapy after completion of Levofloxacin to prevent future infection which was recommended by Dr. Andrei Zhou. Pt may take keflex 500mg twice a day after the completion of Levo as suppressive therapy. However, this will not prevent future infection if she truly has sternal infection that involves LAVD. Cardiology team can contact me at [phone] with specific concerns.  
  
LVAD/ICD Acute on chronic systolic heart failure - cardiology following, s/p cardiac cath 11/3 EF 15-20% 
  
Type III DM with neuropathy - A1C 8.4. continue with insulin therapy Continue with neurontin 
  
ROCIO on CKD 
- creat 2.1 Oly Mercado NP

## 2020-11-04 NOTE — PROGRESS NOTES
Progress Note Patient: King Ginette MRN: [id number]  SSN: [id number] YOB: 1950  Age: 78 y.o. Sex: female Admit Date: 10/27/2020 LOS: 8 days Subjective:  
 
King Ginette is a 78 y.o. female admitted on 10/27/2020  for non-ischemic cardiomyopathy,  acute on chronic systolic CHF (congestive heart failure) ([facility] [address] [I50.23]. Has past medical history of chronic systolic CHF status post AICD, cardiogenic shock, s/p impella bridge to LVAD, post LVAD implantation (4/15/2017 by Dr. Tez Catherine), RV failure, CAD (left heart catheterization 8/2016 with high-grade ramus and small PDA disease borderline disease of LAD and takeoff of pRCA), A. fib, pulmonary hypertension, JED on CPAP, diabetes type 2, HLD, severe COPD, CKD, morbid obesity, hypertension, breast cancer status post bilateral mastectomy/chemo, and endometrial cancer status post hysterectomy, lymphedema left lower extremity, severe COPD. Had previously been followed by advanced heart failure at [facility] but now followed by Dr. Michelle Mesa. Reports she has been being treated for abscess of sternal wound since late 2019. She presented on 10/32 2020 with right lower extremity cellulitis and sternal wound infection is suspected. ID is following and patient is on daptomycin. Cardiology has been asked to perform right heart cath and to establish general cardiology care. Patient is on chronic anticoagulation for LVAD with Coumadin. Patient denies any history of MI. She does report history of Plavix resistance. Her daughter reports patient has coronary stent she believes in perhaps 2017. Patient reports she is able to walk in the hallways without chest pain does develop shoulder pain which she attributes to leaning on walker. Has intermittent shortness of breath. Currently denies chest pain shortness of breath. States she is able to lay flat. No recent history of AICD firing. Today she reports feeling dizzy.  LVAD pump speed increased. No chest pain or shortness of breath.  
  
 
Objective:  
 
Vitals:  
 20 2333 20 0500 20 0729 20 0809 BP:      
Pulse: 67 68  73 Resp: 16 16  17 Temp: 98.8 °F (37.1 °C) 98.1 °F (36.7 °C)  98.2 °F (36.8 °C) SpO2: 97% 96% 96% 97% Weight:  133.4 kg (294 lb 1.5 oz) Height:      
  
Temp (24hrs), Av.2 °F (36.8 °C), Min:97.6 °F (36.4 °C), Max:98.9 °F (37.2 °C) Intake and Output: 
Current Shift: No intake/output data recorded. Last three shifts:  1901 -  0700 In: 840 [P.O.:840] Out: 3150 [FMNDF:4632] Physical Exam: Obese General:  Alert, cooperative, well nourished, well developed, appears stated age Eyes:  Conjunctivae/corneas clear Nose: Nares normal. Septum midline. Mucosa normal. No drainage or sinus tenderness. Neck: Supple, symmetrical, trachea midline, no JVD Lungs:   Clear to auscultation bilaterally, good effort Heart:  Regular rate and rhythm, no murmur, click, rub, or gallop Abdomen:   Soft, non-tender, bowel sounds normal, non-distended Extremities: 3+ edema with stasis dermatitis Skin: Skin color, texture, turgor normal.  
Neurologic: Non focal 
  
 
Current Facility-Administered Medications:  
  warfarin (COUMADIN) tablet 4 mg, 4 mg, Oral, ONCE, Festus Ribeiro MD 
  carvediloL (COREG) tablet 12.5 mg, 12.5 mg, Oral, BID WITH MEALS, Nila Walter NP 
  technetium pyrophosphate (PYP) injection 45.4 millicurie, 93.0 millicurie, IntraVENous, RAD ONCE, Festus Ribeiro MD 
  sacubitriL-valsartan (ENTRESTO) 49-51 mg tablet 1 Tab, 1 Tab, Oral, Q12H, Alpesh Walter NP, 1 Tab at 20 0816 
  influenza vaccine - (6 mos+)(PF) (FLUARIX/FLULAVAL/FLUZONE QUAD) injection 0.5 mL, 0.5 mL, IntraMUSCular, PRIOR TO DISCHARGE, Triny Limon B, NP 
  pneumococcal 23-valent (PNEUMOVAX 23) injection 0.5 mL, 0.5 mL, IntraMUSCular, PRIOR TO DISCHARGE, Triny Limon, NP 
  insulin NPH (NOVOLIN N, HUMULIN N) injection 25 Units, 25 Units, SubCUTAneous, BID, Braxton, Triny B, NP, 25 Units at 11/04/20 0815 
  insulin lispro (HUMALOG) injection 6 Units, 6 Units, SubCUTAneous, TIDAC, Braxton, Triny B, NP, 6 Units at 11/04/20 4684   bumetanide (BUMEX) tablet 2 mg, 2 mg, Oral, DAILY, Polliard, Rosalie Kanabec T, NP, 2 mg at 11/04/20 0816 
  amLODIPine (NORVASC) tablet 2.5 mg, 2.5 mg, Oral, BID, Polliard, Rosalie Kanabec T, NP, 2.5 mg at 11/04/20 0816 
  magnesium oxide (MAG-OX) tablet 400 mg, 400 mg, Oral, BID, Basil Few, NP, 400 mg at 11/04/20 3263   cefepime (MAXIPIME) 2 g in 0.9% sodium chloride (MBP/ADV) 100 mL, 2 g, IntraVENous, Q24H, Oly Belle, NP, Last Rate: 200 mL/hr at 11/03/20 1524, 2 g at 11/03/20 1524   ammonium lactate (LAC-HYDRIN) 12 % lotion, , Topical, BID, Oly Belle, TEA 
  albuterol (PROVENTIL VENTOLIN) nebulizer solution 2.5 mg, 2.5 mg, Nebulization, BID PRN, Disha Hawley MD 
  sodium chloride (NS) flush 5-40 mL, 5-40 mL, IntraVENous, Q8H, Disha Hawley MD, 10 mL at 11/03/20 1501 
  sodium chloride (NS) flush 5-40 mL, 5-40 mL, IntraVENous, PRN, Disha Hawley MD 
  polyethylene glycol (MIRALAX) packet 17 g, 17 g, Oral, DAILY PRN, Disha Hawley MD 
  glucose chewable tablet 16 g, 4 Tab, Oral, PRN, Disha Hawley MD 
  glucagon (GLUCAGEN) injection 1 mg, 1 mg, IntraMUSCular, PRN, Disha Hawley MD 
  sodium chloride (NS) flush 5-40 mL, 5-40 mL, IntraVENous, Q8H, Cr Yeboah E, NP, 10 mL at 11/04/20 0817 
  sodium chloride (NS) flush 5-40 mL, 5-40 mL, IntraVENous, PRN, Cr Yeboah NP 
  Warfarin - Pharmacy to Dose, , Other, Rx Dosing/Monitoring, Russel Ward MD 
  dextrose 10% infusion 0-250 mL, 0-250 mL, IntraVENous, PRN, Cr Yeboah NP 
  insulin lispro (HUMALOG) injection, , SubCUTAneous, AC&HS, Cr Yeboah NP, Stopped at 11/03/20 2200   aspirin delayed-release tablet 81 mg, 81 mg, Oral, DAILY, Disha Hawley, MD, 81 mg at 11/04/20 0816 
  arformoterol 15 mcg/budesonide 0.5 mg neb solution, , Nebulization, BID RT, Disha Hawley MD 
  ergocalciferol capsule 50,000 Units, 50,000 Units, Oral, Q7D, Disha Hawley MD, 50,000 Units at 10/29/20 3723   ferrous sulfate tablet 325 mg, 1 Tab, Oral, DAILY WITH BREAKFAST, Disha Hawley MD, 325 mg at 11/04/20 0816 
  gabapentin (NEURONTIN) capsule 300 mg, 300 mg, Oral, DAILY, Disha Hawley MD, 300 mg at 11/04/20 6606   hydrOXYzine HCL (ATARAX) tablet 10 mg, 10 mg, Oral, TID PRN, Disha Hawley MD 
  icosapent ethyL (VASCEPA) capsule 1 Cap, 1 Cap, Oral, BID WITH MEALS, Disha Hawley MD, Stopped at 10/31/20 0800   PARoxetine (PAXIL) tablet 30 mg, 30 mg, Oral, DAILY, Disha Hawley MD, 30 mg at 11/04/20 3316   tolterodine (DETROL) tablet 8 mg, 8 mg, Oral, BID, Disha Hawley MD, Stopped at 10/31/20 0900   hydrALAZINE (APRESOLINE) 20 mg/mL injection 5 mg, 5 mg, IntraVENous, Q4H PRN, Disha Hawley MD 
  acetaminophen (TYLENOL) tablet 650 mg, 650 mg, Oral, Q4H PRN, Aniyah Edwards MD 
 
Lab/Data Review: 
Labs Latest Ref Rng & Units 11/4/2020 11/3/2020 11/3/2020 11/2/2020 11/1/2020 10/31/2020 10/30/2020 WBC 3.6 - 11.0 K/uL 6.8 - 6.8 7.6 6.9 7.1 9.1  
RBC 3.80 - 5.20 M/uL 3.59(L) - 3.64(L) 3.68(L) 3.77(L) 3.77(L) 3.90 Hemoglobin 11.5 - 16.0 g/dL 9.2(L) - 9. 3(L) 9.4(L) 9.5(L) 9.5(L) 9.8(L) Hematocrit 35.0 - 47.0 % 30. 8(L) - 31. 5(L) 31. 8(L) 32. 1(L) 32. 0(L) 32. 6(L) MCV 80.0 - 99.0 FL 85.8 - 86.5 86.4 85.1 84.9 83.6 Platelets 611 - 227 K/uL 173 - 163 194 196 202 248 Lymphocytes 12 - 49 % - - - - - - - Monocytes 5 - 13 % - - - - - - - Eosinophils 0 - 7 % - - - - - - - Basophils 0 - 1 % - - - - - - - Albumin 3.5 - 5.0 g/dL 2. 9(L) - 2. 9(L) 3. 1(L) 3. 2(L) 3. 1(L) 2. 9(L) Calcium 8.5 - 10.1 MG/DL 9.0 - 8.9 8.8 9.1 8.7 8.9 Glucose 65 - 100 mg/dL 123(H) - 121(H) 185(H) 148(H) 172(H) 189(H) BUN 6 - 20 MG/DL 52(H) - 47(H) 43(H) 35(H) 30(H) 26(H) Creatinine 0.55 - 1.02 MG/DL 2.13(H) - 2.04(H) 2.14(H) 1.82(H) 1.72(H) 1.59(H) Sodium 136 - 145 mmol/L 138 - 138 140 138 141 140 Potassium 3.5 - 5.1 mmol/L 4.5 4.7 5.1 4.0 3.8 4.0 4.1 TSH 0.450 - 4.500 uIU/mL - - - - - - -  
LDH 81 - 246 U/L 329(H) - 517(H) 321(H) 319(H) 316(H) 330(H) Some recent data might be hidden 10/27/20 ECHO ADULT COMPLETE 11/02/2020 11/2/2020 Narrative · LVAD speed 9400-AV opens each beat, MV with decreased excursion LVIDd  
6.2 cm LVIDs 5.8 cm · LV: Estimated LVEF is 15 - 20%. · LA: Mildly dilated left atrium. · AV: Sclerosis with no significant stenosis. · RV: Mildly dilated right ventricle. Mildly reduced systolic function. Pacer/ICD present. · Image quality for this study was technically difficult. Signed by: Naomi Acevedo MD  
 
 
10/27/20 CARDIAC PROCEDURE 11/03/2020 11/3/2020 Narrative · CO/CI = 5.7/2.2 (Sal) · Wedge mean = 15 mmHg. · LVAD in situ 6Fr right internal jugular Vascade closure to facilitate manual pressure INR 2.6 on warfarin Tolerated well. Signed by: Miguelito Solano MD  
 
 
 
 
 
Assessment:  
 
Principal Problem: 
  Acute on chronic systolic CHF (congestive heart failure) ([facility] [address] (10/27/2020) Active Problems: 
  Obesity, morbid ([address] (10/1/2020) NICM (nonischemic cardiomyopathy) ([facility] [address] (11/4/2020) Coronary artery disease involving native coronary artery of native heart without angina pectoris (11/4/2020) JED on CPAP (11/4/2020) Chronic venous insufficiency (11/4/2020) Ulcer of right foot, limited to breakdown of skin ([facility] [address] (11/4/2020) Plan: 1. Acute on chronic systolic CHF and RV failure/Cardiomyopathy -NYHA IV 
            -s/p LVAD 2017  
            -s/p ICD  
            -Management per AHF  
            -Coreg, entresto,bumex 
  
2.  Hx of CAD:  
             -[facility] 8/2016 with high-grade ramus and small PDA disease borderline disease of LAD and [facility] -[facility] from [facility]. ?hx of stents. -CAD Appears stable 
            -continue ASA, not on statin PTA (await records for reason) 
  
3. Venous stasis, RLE venous stasis wound, RLE cellulitis 
            -plan outpatient venous ultrasound for reflux evaluation 4. Hx of Afib 
            -currently v paced 
            -on Coumadin for LVAD  
  
5. HLD: , HDL 29. Consider statin/ await records 
  6. Hx of AICD: [employer] device 
            -Dr. Belinda Melgar to establish care/follow up 
            -Had PPM interrogated this admission- normal pacing and sensing.  
  
7. Sternal wound infection 
            -management per ID and CT surgery. Signed By: Latoya Valdez MD   
 November 4, 2020 Interventional Cardiology [facility] [address], [address] P: [phone] F: [phone]

## 2020-11-04 NOTE — PROGRESS NOTES
[facility]  Hospitalist Group Hospitalist Progress Note Yan Mckenna MD 
Answering service: [phone] OR [phone] from in house phone Date of Service:  2020 NAME:  Minal oJaquin :  1950 MRN:  [id number] Admission Summary: This is a 78year old female with a significant past medical history of chronic systolic heart failure due to ischemic cardiomyopathy with implanted left ventricular assist device. Other significant PMH:  type 2 diabetes, hypertension, obesity, COPD, depression invasion Interval history / Subjective:  
Patient is seen and examined. Feels well in herself, though this morning when got up to toilet got frequent PVCs, and felt dizzy, cardiology increased VAD speed yesterday, now back to prev speed, feels better. ID and CT surgery following, once clarified plan for sternal chronic wound infection, will work on dc. Assessment & Plan:  
Acute-on-chronic systolic congestive heart failure,, NYHA class IV. Ischemic cardiomyopathy- s/p LVAD 
- Being diuresed with IV Bumex. Continue other cardiac regimen: Carvedilol, Entresto. - Daily weight. Strict I&O- Further management by advanced CHF team. 
- S/p RHC 11/3, reduced dose of coreg and entresto, increased speed of VAD- patient felt dizzy and had frequent PVCs, now speed back to previous level. Suspect sternal wound infection: Antibiotics as above. Cardiac surgery consulted Cellulitis of RLE: background of venous stasis and small wound - ID following, cefepime and vancomycin 'switched to Dapto due to Cr rise 
- sternum cultures on 10/31: spencer rosario HTN: chronic, stable, Home regimen, PRN BP meds. Monitor Chronic normocytic anemia. Hemoglobin stable between 89, iron/B12/folate wnl Type 2 diabetes with hyperglycemia: SSI- NPH 20 units twice daily. monitor COPD: stable-Continue maintenance inhaled steroids. Bronchodilators as needed. CKD3: avoid nephrotoxics. F/u with nephro op. Cr bumped up, titrating diuretics down Depression, stable. Continue  Paxil Morbid obesityBody mass index is 46.06 kg/m². Counseled on weight loss #. L Labial hematoma: 2cm ovale bruise, likely hematoma from purewick use. Monitor, f/u op with gynecology if not resolving. Code status: Full code DVT prophylaxis: On warfarin Care Plan discussed with: Patient/Family and Nurse Anticipated Disposition: Home w/Family: 1-2days Hospital Problems  Date Reviewed: 10/28/2020 Codes Class Noted POA  
 NICM (nonischemic cardiomyopathy) ([facility] ICD-10-CM: I42.8 ICD-9-CM: 425.4  11/4/2020 Yes Coronary artery disease involving native coronary artery of native heart without angina pectoris ICD-10-CM: I25.10 ICD-9-CM: 414.01  11/4/2020 Yes JED on CPAP ICD-10-CM: G47.33, Z99.89 ICD-9-CM: 327.23, V46.8  11/4/2020 Yes Chronic venous insufficiency ICD-10-CM: I87.2 ICD-9-CM: 459.81  11/4/2020 Yes Ulcer of right foot, limited to breakdown of skin ([facility] ICD-10-CM: G87.429 ICD-9-CM: 707.15  11/4/2020 Yes * (Principal) Acute on chronic systolic CHF (congestive heart failure) (HCC) ICD-10-CM: S29.43 ICD-9-CM: 428.23, 428.0  10/27/2020 Yes Obesity, morbid ([facility] ICD-10-CM: E66.01 
ICD-9-CM: 278.01  10/1/2020 Yes Review of Systems: A comprehensive review of systems was negative except for that written in the HPI. Vital Signs:  
 Last 24hrs VS reviewed since prior progress note. Most recent are: 
Visit Vitals BP (!) 90/58 Pulse 73 Temp 98.2 °F (36.8 °C) Resp 17 Ht 5' 7\" (1.702 m) Wt 133.4 kg (294 lb 1.5 oz) SpO2 97% BMI 46.06 kg/m² Intake/Output Summary (Last 24 hours) at 11/4/2020 1040 Last data filed at 11/4/2020 0500 Gross per 24 hour Intake 720 ml Output 1700 ml Net -980 ml Physical Examination:  
 
Constitutional:  Patient's lying flat in bed without any distress, on room air. Morbidly obese Sternal  Small wound site, covered. GI:  Obese. LVAD exit to the epigastric area. Soft, non distended, non tender Musculoskeletal:  signs of chronic venous insufficiency. Neurologic:  Mental status:AAOx3, Motor exam:Moves all extremities symmetrically Data Review:  
 Review and/or order of clinical lab test 
Review and/or order of tests in the radiology section of CPT Review and/or order of tests in the medicine section of CPT Labs:  
 
Recent Labs 11/04/20 
0549 11/03/20 
6899 WBC 6.8 6.8 HGB 9.2* 9.3* HCT 30.8* 31.5*  163 Recent Labs 11/04/20 
6416 11/03/20 
1844 11/03/20 0528 11/02/20 
7078   --  138 140  
K 4.5 4.7 5.1 4.0  
  --  102 102 CO2 29  --  32 31 BUN 52*  --  47* 43* CREA 2.13*  --  2.04* 2.14* *  --  121* 185* CA 9.0  --  8.9 8.8 MG 2.4 2.2 2.3 2.1 Recent Labs 11/04/20 
4003 11/03/20 0528 11/02/20 
1732 ALT 8* 10* 8* AP 89 83 89 TBILI 0.3 0.3 0.3 TP 7.3 7.4 7.4 ALB 2.9* 2.9* 3.1*  
GLOB 4.4* 4.5* 4.3* Recent Labs 11/04/20 
9221 11/03/20 0528 11/02/20 
7694 INR 2.6* 2.6* 2.4* PTP 25.8* 26.5* 24.5* APTT 51.8* 47.7* 50.5* No results for input(s): FE, TIBC, PSAT, FERR in the last 72 hours. Lab Results Component Value Date/Time Folate 10.0 10/28/2020 03:56 AM  
  
No results for input(s): PH, PCO2, PO2 in the last 72 hours. Recent Labs 11/04/20 
0549 11/02/20 
9492 CPK 70 135 Lab Results Component Value Date/Time Cholesterol, total 192 10/01/2020 12:00 AM  
 HDL Cholesterol 29 (L) 10/01/2020 12:00 AM  
 LDL Chol Calc (NIH) 123 (H) 10/01/2020 12:00 AM  
 Triglyceride 222 (H) 10/01/2020 12:00 AM  
 
Lab Results Component Value Date/Time  Glucose (POC) 126 (H) 11/04/2020 06:37 AM  
 Glucose (POC) 156 (H) 11/03/2020 09:43 PM  
 Glucose (POC) 141 (H) 11/03/2020 05:18 PM  
 Glucose (POC) 126 (H) 11/03/2020 06:21 AM  
 Glucose (POC) 204 (H) 11/02/2020 10:11 PM  
 
No results found for: COLOR, APPRN, SPGRU, REFSG, GENOVEVA, PROTU, GLUCU, KETU, BILU, UROU, DAVIE, LEUKU, GLUKE, EPSU, BACTU, WBCU, RBCU, CASTS, UCRY Medications Reviewed:  
 
Current Facility-Administered Medications Medication Dose Route Frequency  warfarin (COUMADIN) tablet 4 mg  4 mg Oral ONCE  carvediloL (COREG) tablet 12.5 mg  12.5 mg Oral BID WITH MEALS  
 technetium pyrophosphate (PYP) injection 86.0 millicurie  45.2 millicurie IntraVENous RAD ONCE  
 sacubitriL-valsartan (ENTRESTO) 49-51 mg tablet 1 Tab  1 Tab Oral Q12H  
 influenza vaccine 2020-21 (6 mos+)(PF) (FLUARIX/FLULAVAL/FLUZONE QUAD) injection 0.5 mL  0.5 mL IntraMUSCular PRIOR TO DISCHARGE  pneumococcal 23-valent (PNEUMOVAX 23) injection 0.5 mL  0.5 mL IntraMUSCular PRIOR TO DISCHARGE  insulin NPH (NOVOLIN N, HUMULIN N) injection 25 Units  25 Units SubCUTAneous BID  insulin lispro (HUMALOG) injection 6 Units  6 Units SubCUTAneous TIDAC  bumetanide (BUMEX) tablet 2 mg  2 mg Oral DAILY  amLODIPine (NORVASC) tablet 2.5 mg  2.5 mg Oral BID  magnesium oxide (MAG-OX) tablet 400 mg  400 mg Oral BID  cefepime (MAXIPIME) 2 g in 0.9% sodium chloride (MBP/ADV) 100 mL  2 g IntraVENous Q24H  
 ammonium lactate (LAC-HYDRIN) 12 % lotion   Topical BID  albuterol (PROVENTIL VENTOLIN) nebulizer solution 2.5 mg  2.5 mg Nebulization BID PRN  
 sodium chloride (NS) flush 5-40 mL  5-40 mL IntraVENous Q8H  
 sodium chloride (NS) flush 5-40 mL  5-40 mL IntraVENous PRN  polyethylene glycol (MIRALAX) packet 17 g  17 g Oral DAILY PRN  
 glucose chewable tablet 16 g  4 Tab Oral PRN  
 glucagon (GLUCAGEN) injection 1 mg  1 mg IntraMUSCular PRN  
 sodium chloride (NS) flush 5-40 mL  5-40 mL IntraVENous Q8H  
 sodium chloride (NS) flush 5-40 mL  5-40 mL IntraVENous PRN  
 Warfarin - Pharmacy to Dose   Other Rx Dosing/Monitoring  dextrose 10% infusion 0-250 mL  0-250 mL IntraVENous PRN  
 insulin lispro (HUMALOG) injection   SubCUTAneous AC&HS  aspirin delayed-release tablet 81 mg  81 mg Oral DAILY  arformoterol 15 mcg/budesonide 0.5 mg neb solution   Nebulization BID RT  
 ergocalciferol capsule 50,000 Units  50,000 Units Oral Q7D  
 ferrous sulfate tablet 325 mg  1 Tab Oral DAILY WITH BREAKFAST  gabapentin (NEURONTIN) capsule 300 mg  300 mg Oral DAILY  hydrOXYzine HCL (ATARAX) tablet 10 mg  10 mg Oral TID PRN  
 icosapent ethyL (VASCEPA) capsule 1 Cap  1 Cap Oral BID WITH MEALS  
 PARoxetine (PAXIL) tablet 30 mg  30 mg Oral DAILY  tolterodine (DETROL) tablet 8 mg  8 mg Oral BID  hydrALAZINE (APRESOLINE) 20 mg/mL injection 5 mg  5 mg IntraVENous Q4H PRN  
 acetaminophen (TYLENOL) tablet 650 mg  650 mg Oral Q4H PRN  
 
______________________________________________________________________ EXPECTED LENGTH OF STAY: 2d 7h 
ACTUAL LENGTH OF STAY:          8 Shelly Estrada MD

## 2020-11-05 NOTE — PROGRESS NOTES
Progress Note Patient: Marina Figueroa MRN: [id number]  SSN: [id number] YOB: 1950  Age: 78 y.o. Sex: female Admit Date: 10/27/2020 LOS: 9 days Subjective:  
 
Marina Figueroa is a 78 y.o. female admitted on 10/27/2020  for non-ischemic cardiomyopathy,  acute on chronic systolic CHF (congestive heart failure) ([facility] [address] [I50.23]. Has past medical history of chronic systolic CHF status post AICD, cardiogenic shock, s/p impella bridge to LVAD, post LVAD implantation (4/15/2017 by Dr. Radha Fritz), RV failure, CAD (left heart catheterization 8/2016 with high-grade ramus and small PDA disease borderline disease of LAD and takeoff of pRCA), A. fib, pulmonary hypertension, JED on CPAP, diabetes type 2, HLD, severe COPD, CKD, morbid obesity, hypertension, breast cancer status post bilateral mastectomy/chemo, and endometrial cancer status post hysterectomy, lymphedema left lower extremity, severe COPD. Had previously been followed by advanced heart failure at [facility] but now followed by Dr. John Osborne. Reports she has been being treated for abscess of sternal wound since late 2019. She presented on 10/32 2020 with right lower extremity cellulitis and sternal wound infection is suspected. ID is following and patient is on daptomycin. Cardiology has been asked to perform right heart cath and to establish general cardiology care. Patient is on chronic anticoagulation for LVAD with Coumadin. Patient denies any history of MI. She does report history of Plavix resistance. Her daughter reports patient has coronary stent she believes in perhaps 2017. Patient reports she is able to walk in the hallways without chest pain does develop shoulder pain which she attributes to leaning on walker. Has intermittent shortness of breath. Currently denies chest pain shortness of breath. States she is able to lay flat. No recent history of AICD firing.  
 
Unable to tolerate increase in LVAD pump speed from 9400 to 9600. Now back to 9400 and she feels better. No dizziness. No chest pain or shortness of breath. Anxious for discharge. 
  
 
Objective:  
 
Vitals:  
 20 0332 20 0701 20 0818 20 1230 BP:      
Pulse: 70  70 71 Resp: 18  18 16 Temp: 97.9 °F (36.6 °C)  98 °F (36.7 °C) 98.2 °F (36.8 °C) SpO2: 95%  93% 94% Weight:  133.7 kg (294 lb 12.1 oz) Height:      
  
Temp (24hrs), Av.1 °F (36.7 °C), Min:97.9 °F (36.6 °C), Max:98.2 °F (36.8 °C) Intake and Output: 
Current Shift: 701 - 1900 In: 640 [P.O.:440; I.V.:200] Out: 900 [Urine:900] Last three shifts: 1901 - 700 In: 1560 [P.O.:1560] Out: 1950 [URMNX:6416] Physical Exam: Obese General:  Alert, cooperative, well nourished, well developed, appears stated age Eyes:  Conjunctivae/corneas clear Nose: Nares normal. Septum midline. Mucosa normal. No drainage or sinus tenderness. Neck: Supple, symmetrical, trachea midline, no JVD Lungs:   Clear to auscultation bilaterally, good effort Heart:  Regular rate and rhythm, no murmur, click, rub, or gallop Abdomen:   Soft, non-tender, bowel sounds normal, non-distended Extremities: 2+ edema with stasis dermatitis Skin: Skin color, texture, turgor normal.  
Neurologic: Non focal 
  
 
Current Facility-Administered Medications:  
  warfarin (COUMADIN) tablet 4 mg, 4 mg, Oral, ONCE, CHARLENE [employer] M, DO 
  miconazole (MICOTIN) 2 % powder, , Topical, BID, CHARLENE [employer] M, DO 
  carvediloL (COREG) tablet 12.5 mg, 12.5 mg, Oral, BID WITH MEALS, Pa Walter NP, 12.5 mg at 20 8457   bumetanide (BUMEX) tablet 2 mg, 2 mg, Oral, DAILY, Pa Walter NP, 2 mg at 20 5153   influenza vaccine 2020- (6 mos+)(PF) (FLUARIX/FLULAVAL/FLUZONE QUAD) injection 0.5 mL, 0.5 mL, IntraMUSCular, PRIOR TO DISCHARGE, Triny Limon NP 
  pneumococcal 23-valent (PNEUMOVAX 23) injection 0.5 mL, 0.5 mL, IntraMUSCular, PRIOR TO DISCHARGE, Braxton Triny B, NP 
  insulin NPH (NOVOLIN N, HUMULIN N) injection 25 Units, 25 Units, SubCUTAneous, BID, Braxton Triny B, NP, 25 Units at 11/05/20 9317   insulin lispro (HUMALOG) injection 6 Units, 6 Units, SubCUTAneous, TIDAC, Braxton, Triny B, NP, 6 Units at 11/05/20 1241 
  amLODIPine (NORVASC) tablet 2.5 mg, 2.5 mg, Oral, BID, Polliard, Crow Sarna, NP, 2.5 mg at 11/05/20 1463   magnesium oxide (MAG-OX) tablet 400 mg, 400 mg, Oral, BID, Valora Forte, NP, 400 mg at 11/05/20 6957   cefepime (MAXIPIME) 2 g in 0.9% sodium chloride (MBP/ADV) 100 mL, 2 g, IntraVENous, Q24H, Oly Belle, NP, Last Rate: 200 mL/hr at 11/04/20 1533, 2 g at 11/04/20 1533   ammonium lactate (LAC-HYDRIN) 12 % lotion, , Topical, BID, Oly Belle, NP 
  albuterol (PROVENTIL VENTOLIN) nebulizer solution 2.5 mg, 2.5 mg, Nebulization, BID PRN, Disha Hawley MD 
  sodium chloride (NS) flush 5-40 mL, 5-40 mL, IntraVENous, Q8H, Disha Hawley MD, 10 mL at 11/04/20 2236   sodium chloride (NS) flush 5-40 mL, 5-40 mL, IntraVENous, PRN, Disha Hawley MD 
  polyethylene glycol (MIRALAX) packet 17 g, 17 g, Oral, DAILY PRN, Disha Hawley MD 
  glucose chewable tablet 16 g, 4 Tab, Oral, PRN, Disha Hawley MD 
  glucagon (GLUCAGEN) injection 1 mg, 1 mg, IntraMUSCular, PRN, Disha Hawely MD 
  sodium chloride (NS) flush 5-40 mL, 5-40 mL, IntraVENous, Q8H, Valora Forte E, NP, 10 mL at 11/05/20 0700 
  sodium chloride (NS) flush 5-40 mL, 5-40 mL, IntraVENous, PRN, Larry Traore NP 
  Warfarin - Pharmacy to Dose, , Other, Rx Dosing/Monitoring, Humble Ward MD 
  dextrose 10% infusion 0-250 mL, 0-250 mL, IntraVENous, PRN, Larry Traore, NP 
  insulin lispro (HUMALOG) injection, , SubCUTAneous, AC&HS, Larry Traore NP, Stopped at 11/03/20 2200   aspirin delayed-release tablet 81 mg, 81 mg, Oral, DAILY, Disha Hawley MD, 81 mg at 11/05/20 9410 
  arformoterol 15 mcg/budesonide 0.5 mg neb solution, , Nebulization, BID RT, Disha Hawley MD 
  ergocalciferol capsule 50,000 Units, 50,000 Units, Oral, Q7D, Disha Hawley MD, 50,000 Units at 11/05/20 7839   ferrous sulfate tablet 325 mg, 1 Tab, Oral, DAILY WITH BREAKFAST, Disha Hawley MD, 325 mg at 11/05/20 0856 
  gabapentin (NEURONTIN) capsule 300 mg, 300 mg, Oral, DAILY, Disha Hawley MD, Stopped at 11/05/20 0900   hydrOXYzine HCL (ATARAX) tablet 10 mg, 10 mg, Oral, TID PRN, Disha Hawley MD 
  icosapent ethyL (VASCEPA) capsule 1 Cap, 1 Cap, Oral, BID WITH MEALS, Disha Hawley MD, Stopped at 10/31/20 0800   PARoxetine (PAXIL) tablet 30 mg, 30 mg, Oral, DAILY, Disha Hawley MD, 30 mg at 11/05/20 5942   tolterodine (DETROL) tablet 8 mg, 8 mg, Oral, BID, Disha Hawley MD, Stopped at 10/31/20 0900   hydrALAZINE (APRESOLINE) 20 mg/mL injection 5 mg, 5 mg, IntraVENous, Q4H PRN, Disha Hawley MD 
  acetaminophen (TYLENOL) tablet 650 mg, 650 mg, Oral, Q4H PRN, Clifton Cantor MD 
 
Lab/Data Review: 
Labs Latest Ref Rng & Units 11/5/2020 11/4/2020 11/3/2020 11/3/2020 11/2/2020 11/1/2020 10/31/2020 WBC 3.6 - 11.0 K/uL 6.6 6.8 - 6.8 7.6 6.9 7.1  
RBC 3.80 - 5.20 M/uL 3.66(L) 3.59(L) - 3.64(L) 3.68(L) 3.77(L) 3.77(L) Hemoglobin 11.5 - 16.0 g/dL 9.4(L) 9.2(L) - 9. 3(L) 9.4(L) 9.5(L) 9.5(L) Hematocrit 35.0 - 47.0 % 31. 5(L) 30. 8(L) - 31. 5(L) 31. 8(L) 32. 1(L) 32. 0(L) MCV 80.0 - 99.0 FL 86.1 85.8 - 86.5 86.4 85.1 84.9 Platelets 151 - 073 K/uL 170 173 - 163 194 196 202 Lymphocytes 12 - 49 % - - - - - - - Monocytes 5 - 13 % - - - - - - - Eosinophils 0 - 7 % - - - - - - - Basophils 0 - 1 % - - - - - - - Albumin 3.5 - 5.0 g/dL 3. 0(L) 2. 9(L) - 2. 9(L) 3. 1(L) 3. 2(L) 3. 1(L) Calcium 8.5 - 10.1 MG/DL 9.1 9.0 - 8.9 8.8 9.1 8.7 Glucose 65 - 100 mg/dL 95 123(H) - 121(H) 185(H) 148(H) 172(H) BUN 6 - 20 MG/DL 61(H) 52(H) - 47(H) 43(H) 35(H) 30(H) Creatinine 0.55 - 1.02 MG/DL 2.24(H) 2.13(H) - 2.04(H) 2.14(H) 1.82(H) 1.72(H) Sodium 136 - 145 mmol/L 140 138 - 138 140 138 141 Potassium 3.5 - 5.1 mmol/L 4.5 4.5 4.7 5.1 4.0 3.8 4.0 TSH 0.450 - 4.500 uIU/mL - - - - - - -  
LDH 81 - 246 U/L 312(H) 329(H) - 517(H) 321(H) 319(H) 316(H) Some recent data might be hidden 10/27/20 ECHO ADULT COMPLETE 11/02/2020 11/2/2020 Narrative · LVAD speed 9400-AV opens each beat, MV with decreased excursion LVIDd  
6.2 cm LVIDs 5.8 cm · LV: Estimated LVEF is 15 - 20%. · LA: Mildly dilated left atrium. · AV: Sclerosis with no significant stenosis. · RV: Mildly dilated right ventricle. Mildly reduced systolic function. Pacer/ICD present. · Image quality for this study was technically difficult. Signed by: Giacomo Smith MD  
 
 
10/27/20 CARDIAC PROCEDURE 11/03/2020 11/3/2020 Narrative · CO/CI = 5.7/2.2 (Sal) · Wedge mean = 15 mmHg. · LVAD in situ 6Fr right internal jugular Vascade closure to facilitate manual pressure INR 2.6 on warfarin Tolerated well. Signed by: Veronica Saenz MD  
 
 
Assessment:  
 
Principal Problem: 
  Acute on chronic systolic CHF (congestive heart failure) ([facility] [address] (10/27/2020) Active Problems: 
  Obesity, morbid ([facility] [address] (10/1/2020) NICM (nonischemic cardiomyopathy) ([facility] [address] (11/4/2020) Coronary artery disease involving native coronary artery of native heart without angina pectoris (11/4/2020) JED on CPAP (11/4/2020) Chronic venous insufficiency (11/4/2020) Ulcer of right foot, limited to breakdown of skin ([facility] [address] (11/4/2020) Plan: 1. Acute on chronic systolic CHF and RV failure/Cardiomyopathy; Non-ischemic Cardiomyopathy -NYHA IV 
            -s/p LVAD 2017  
            -s/p ICD  
            -Management per AHF  
            -Coreg, entresto, bumex 
  
2.  Hx of CAD:  
             -[facility] 8/2016 with high-grade ramus and small PDA disease borderline disease of [facility] -[facility] from [facility]. ?hx of stents. -CAD Appears stable 
            -continue ASA, not on statin PTA (await records for reason) 
  
3. Venous stasis, RLE venous stasis wound, RLE cellulitis 
            -plan outpatient venous ultrasound for reflux evaluation 4. Hx of Afib 
            -currently v paced 
            -on Coumadin for LVAD  
  
5. HLD: , HDL 29. Consider statin/ await records 
  6. Hx of AICD: [email or web address] device 
            -Dr. John Almanza to establish care/follow up 
            -Had PPM interrogated this admission- normal pacing and sensing.  
  
7. Sternal wound infection 
            -chronic antibiotics Plans for discharge noted. Will follow up in clinic. Signed By: Chele Rodrigez MD   
 November 5, 2020 Interventional Cardiology [facility] [address], [address] P: [phone] F: [phone]

## 2020-11-05 NOTE — PROGRESS NOTES
[address] [facility] in [place], [place] Heart Failure Inpatient Progress Note Patient name: Sahil Don Patient : 1950 Patient MRN: [id number] Date of service: 20 CHIEF COMPLAINT: 
Cellulitis 
  
PLAN: 
[address] 11/3 shows CVP 14 mmHg, PA 36/18 mmHg, PCWP 15 mmHg, Sal CI 2.4 Continue current device speed increased to 9400 with low speed limit of 9000; intolerant to higher speed due to VT Appreciate Dr. Monsivais April input re: higher PPM rate (70 bpm) and VT; begin mexiletine and monitor overnight PYP strongly suggestive of aTTR amyloidosis; repeat gammopathy pending, and if negative, will consider addition of tafamidis as OP (will require patient assistance) Continue Coreg 12.5 mg PO BID; intolerant of higher dose due to RV failure Intolerant of ACEi/ARB/ARNI due to aTTR Continue Norvasc 2.5 mg PO BID No spironolactone due to hyperkalemia Bumex to 2 mg PO daily Discontinue hydralazine Decrease Mag Ox to 400 mg PO daily (2.6 today) Patient states she has reaction to allopurinol (leg edema), will hold Continue synthroid, TSH & Free T4 at goal 
Vtamin D,  high dose weekly x 12 weeks Chronic anticoagulation with coumadin, dosage per pharmacy (goal 2-2.5) Continue baby ASA Patient not taking statin, retrieve medical records for reason Continue CPAP for JED Antibiotics per ID; plan to d/c on PO levaquin and will require lifelong suppression with Keflex Sternal wound infection per CTS; continue medical management for now DTC consult recs appreciated, HgbA1C -8.4% NPH and SSI per DTC recs Ambulate daily/PT/OT Nutrition consult ordered GI, urogynecology, and pulmonary consults as OP Note: Needs multiple referrals prior to hospital discharge: GYN and urogynecologist, pulmonologist, ; also patient needs flu shot and review records if has pneumonia vaccine at [facility]: 
R leg cellulitis BLE edema Acute on chronic RV failure Coronary artery disease · [facility] (8/2016) high grade ramus and small PDA disease, borderline disease of LAD and takeoff of pRCA Chronic systolic heart failure · Stage C, NYHA class IV improved to IIIA symptoms with LVAD · Combined ischemic and non-ischemic cardiomyopathy, LVEF 15% · Mitral regurtigation, moderate to severe, resolved C/b cardiogenic shock s/p Impella bridge to LVAD S/p HeartMate 2 LVAD implantation (4/5/17 by Dr. Shantel Almanza at [facility]) · C/b delayed extubation due to severe COPD 
· C/b critical illness polyneuropathy · C/b prolonged hospitalization post-LVAD, 55 days · C/b sternal wound infection s/p debridement (by Dr. Shantel Almanza) s/p wound vac · C/b sacral ulcer · Would culture positive for Staph aureus, not MRSA · C/b LVAD site drainage, improved S/p CRT-ICD · ICD fired due to electrolyte imbalance (2011) H/o breast cancer (1992) · s/p bilateral mastectomy/chemo and endometrial cancer s/p hysterectomy · Lymphedema of LLE due to cancer treatment Severe COPD with FEV1 50% Depression Atrial fibrillation H/o \"two mini strokes\" S/p fall with hip facture · Right hip hemmiarthroplasty (5/23/18) by Dr. Joe Rosen) · S/p removed hip hardwarare due to pain (4/15/19) COPD severe CKD, stage 3 Hyperkalemia Pulmonary hypertension Cardiac risk factors: · Morbid obesity, Body mass index is 46.17 kg/m². · DM2 insulin dependent · JED on CPAP 
· HL Urinary incontinence, severe · no procedures due to anticoagulation · conservative management with Detrol 4 pills bid Endometrial cancer (2002) HTN 
HL 
aTTR amyloidosis  
  
CARDIAC IMAGING: 
Echo (9/24/19) LVEF 20-25%, AV opens 1:1, no AR Echo (5/29/18) LVEF 10-%, ramps study done, report in [facility] Echo (1/9/18) ramp study done, LVEDD 7.1cm [facility] (11/9/18) 2 vessel disease with 90% OM, 80% PDA, DSA to PDA branch 
TTE (10/27/20) LVEF 15-20% LVIDd 7.26cm RVIDd 2.82cm Tapse 1.14cm 
  
HEMODYNAMICS: 
RHC not done CPEST not done 6MW not done   
OTHER IMAGING: 
EGD/C-scope (19) no active bleeding and polyp removed. 
  
FAMILY HISTORY: 
Mother  76 years, diagnosed with DM, HTN, CAD Father  [de-identified]years old, HTN, CAD, MI 
Positive for DM and heart disease in the family, brother with valve replacement 
  
SOCIAL HISTORY: 
Never alcohol, never smoked, , lives alone, no illicit drug use, lives in house, ambulatory status indpendedn, children: daughter, occupation retired Lives alone. 
  
ALLERGIES: benzodiazepines and quetiapine fumerate (lethargy, resp failure to both) 
  
HISTORY OF PRESENT ILLNESS: 
Briefly, Beryle Baars is a 78 y.o. female with multiple comorbidities listed above presents to our clinic requesting transfer of care from [facility] LVAD program. 
  
REVIEW OF SYSTEMS: 
General: Denies fever Ear, nose and throat: Denies difficulty hearing, sinus problems, runny nose, post-nasal drip, ringing in ears, mouth sores, loose teeth, ear pain, nosebleeds, sore throate, facial pain or numbess Cardiovascular: see above in the interval history Respiratory: denies cough, wheezing, sputum production, or hemoptysis. Gastrointestinal: Denies heartburn, constipation, intolerance to certain foods, diarrhea, abdominal pain, nausea, vomiting, difficulty swallowing, blood in stool Kidney and bladder: Denies painful urination, frequent urination, or urgency Musculoskeletal: Denies joint pain, muscle weakness Skin and hair: Denies change in hair loss or increase, breast changes Redness and edema, skin breakdown RLE. PHYSICAL EXAM: 
Visit Vitals BP (!) 90/58 Pulse 71 Temp 98.2 °F (36.8 °C) Resp 16 Ht 5' 7\" (1.702 m) Wt 294 lb 12.1 oz (133.7 kg) SpO2 94% BMI 46.17 kg/m² LVAD Pump Speed (RPM): 9400 Pump Flow (LPM): 5.1 MAP: 82 
PI (Pulsitility Index): 5.7 Power: 5.7  Test: Yes 
Back Up  at Bedside & Labeled: Yes Power Module Test: Yes Driveline Site Care:  No Driveline Dressing: Clean, Dry, and Intact Outpatient: No 
MAP in Therapeutic Range (Outpatient): Yes Testing Alarms Reviewed: Yes 
Back up SC speed: 9400 Back up Low Speed Limit: 9000 Emergency Equipment with Patient?: Yes Emergency procedures reviewed?: Yes Drive line site inspected?: Yes Drive line intergrity inspected?: Yes Drive line dressing changed?: No 
 
 
General: Patient is morbidly obese in no acute distress, sitting up in chair HEENT: Normocephalic and atraumatic. No scleral icterus. Pupils are equal, round and reactive to light and accomodation. No conjunctival injection. Oropharynx is clear. Neck: Supple. No evidence of thyroid enlargements or lymphadenopathy. JVD: Cannot be appreciated Lungs: Breath sounds are equal and clear bilaterally. No wheezes, rhonchi, or rales. Heart: VAD hum Abdomen: Soft, obese, no mass or tenderness. No organomegaly or hernia. Bowel sounds present. Genitourinary and rectal: deferred Extremities: No cyanosis, clubbing, Redness and edema, skin breakdown RLE; BLE edema 2+, venous stasis Neurologic: No focal sensory or motor deficits are noted. Grossly intact. Psychiatric: Awake, alert and oriented x 3. Appropriate mood and affect. Skin: Warm, dry, no nodules + petechiae on extremities PAST MEDICAL HISTORY: 
Past Medical History:  
Diagnosis Date  Asthma  Cancer ([address] breast  
 Cancer ([facility] [address]   
 endometrial  
 Congestive heart failure, unspecified  CRI (chronic renal insufficiency)  Depression  Diabetes ([facility] [address]  Hypertension PAST SURGICAL HISTORY: 
Past Surgical History:  
Procedure Laterality Date  HX HERNIA REPAIR    
 HX HYSTERECTOMY  HX MASTECTOMY FAMILY HISTORY: 
History reviewed. No pertinent family history. SOCIAL HISTORY: 
Social History Socioeconomic History  Marital status:  Spouse name: Not on file  Number of children: Not on file  Years of education: Not on file  Highest education level: Not on file Tobacco Use  Smoking status: Never Smoker  Smokeless tobacco: Never Used Substance and Sexual Activity  Alcohol use: Not Currently LABORATORY RESULTS: 
Labs Latest Ref Rng & Units 11/5/2020 11/4/2020 11/3/2020 11/3/2020 11/2/2020 11/1/2020 10/31/2020 WBC 3.6 - 11.0 K/uL 6.6 6.8 - 6.8 7.6 6.9 7.1  
RBC 3.80 - 5.20 M/uL 3.66(L) 3.59(L) - 3.64(L) 3.68(L) 3.77(L) 3.77(L) Hemoglobin 11.5 - 16.0 g/dL 9.4(L) 9.2(L) - 9. 3(L) 9.4(L) 9.5(L) 9.5(L) Hematocrit 35.0 - 47.0 % 31. 5(L) 30. 8(L) - 31. 5(L) 31. 8(L) 32. 1(L) 32. 0(L) MCV 80.0 - 99.0 FL 86.1 85.8 - 86.5 86.4 85.1 84.9 Platelets 548 - 598 K/uL 170 173 - 163 194 196 202 Lymphocytes 12 - 49 % - - - - - - - Monocytes 5 - 13 % - - - - - - - Eosinophils 0 - 7 % - - - - - - - Basophils 0 - 1 % - - - - - - - Albumin 3.5 - 5.0 g/dL 3. 0(L) 2. 9(L) - 2. 9(L) 3. 1(L) 3. 2(L) 3. 1(L) Calcium 8.5 - 10.1 MG/DL 9.1 9.0 - 8.9 8.8 9.1 8.7 Glucose 65 - 100 mg/dL 95 123(H) - 121(H) 185(H) 148(H) 172(H) BUN 6 - 20 MG/DL 61(H) 52(H) - 47(H) 43(H) 35(H) 30(H) Creatinine 0.55 - 1.02 MG/DL 2.24(H) 2.13(H) - 2.04(H) 2.14(H) 1.82(H) 1.72(H) Sodium 136 - 145 mmol/L 140 138 - 138 140 138 141 Potassium 3.5 - 5.1 mmol/L 4.5 4.5 4.7 5.1 4.0 3.8 4.0 TSH 0.450 - 4.500 uIU/mL - - - - - - -  
LDH 81 - 246 U/L 312(H) 329(H) - 517(H) 321(H) 319(H) 316(H) Some recent data might be hidden ALLERGY: 
No Known Allergies CURRENT MEDICATIONS: 
 
Current Facility-Administered Medications:  
  warfarin (COUMADIN) tablet 4 mg, 4 mg, Oral, ONCE, CHARLENE [employer] M, DO 
  miconazole (MICOTIN) 2 % powder, , Topical, BID, CHARLENE [employer] M, DO 
  mexiletine (MEXITIL) capsule 150 mg, 150 mg, Oral, TID, Tolu Maldonado MD 
  carvediloL (COREG) tablet 12.5 mg, 12.5 mg, Oral, BID WITH MEALS, Anjali Walter NP, 12.5 mg at 11/05/20 2120   bumetanide (BUMEX) tablet 2 mg, 2 mg, Oral, DAILY, Anjali Wlater NP, 2 mg at 11/05/20 2329   influenza vaccine 2020-21 (6 mos+)(PF) (FLUARIX/FLULAVAL/FLUZONE QUAD) injection 0.5 mL, 0.5 mL, IntraMUSCular, PRIOR TO DISCHARGE, Braxton Triny B, NP 
  pneumococcal 23-valent (PNEUMOVAX 23) injection 0.5 mL, 0.5 mL, IntraMUSCular, PRIOR TO DISCHARGE, Braxton, Triny B, NP 
  insulin NPH (NOVOLIN N, HUMULIN N) injection 25 Units, 25 Units, SubCUTAneous, BID, Braxton, Triny B, NP, 25 Units at 11/05/20 5514   insulin lispro (HUMALOG) injection 6 Units, 6 Units, SubCUTAneous, TIDAC, Braxton, Triny B, NP, 6 Units at 11/05/20 1241 
  amLODIPine (NORVASC) tablet 2.5 mg, 2.5 mg, Oral, BID, Polliard, Delta Cunas, NP, 2.5 mg at 11/05/20 4874   magnesium oxide (MAG-OX) tablet 400 mg, 400 mg, Oral, BID, Will Dress, NP, 400 mg at 11/05/20 6058   cefepime (MAXIPIME) 2 g in 0.9% sodium chloride (MBP/ADV) 100 mL, 2 g, IntraVENous, Q24H, Oly Belle, NP, Last Rate: 200 mL/hr at 11/04/20 1533, 2 g at 11/04/20 1533   ammonium lactate (LAC-HYDRIN) 12 % lotion, , Topical, BID, Oly Belle, NP 
  albuterol (PROVENTIL VENTOLIN) nebulizer solution 2.5 mg, 2.5 mg, Nebulization, BID PRN, Disha Hawley MD 
  sodium chloride (NS) flush 5-40 mL, 5-40 mL, IntraVENous, Q8H, Disha Hawley MD, 10 mL at 11/04/20 2236   sodium chloride (NS) flush 5-40 mL, 5-40 mL, IntraVENous, PRN, Disha Hawley MD 
  polyethylene glycol (MIRALAX) packet 17 g, 17 g, Oral, DAILY PRN, Disha Hawley MD 
  glucose chewable tablet 16 g, 4 Tab, Oral, PRN, Disha Hawley MD 
  glucagon (GLUCAGEN) injection 1 mg, 1 mg, IntraMUSCular, PRN, Disha Hawley MD 
  sodium chloride (NS) flush 5-40 mL, 5-40 mL, IntraVENous, Q8H, Teena James NP, 10 mL at 11/05/20 0700 
  sodium chloride (NS) flush 5-40 mL, 5-40 mL, IntraVENous, PRN, Isael Isabel NP 
  Warfarin - Pharmacy to Dose, , Other, Rx Dosing/Monitoring, Sheri Ward MD 
  dextrose 10% infusion 0-250 mL, 0-250 mL, IntraVENous, PRN, Aniya Mansfield NP 
  insulin lispro (HUMALOG) injection, , SubCUTAneous, AC&HS, Aniya Mansfield NP, Stopped at 11/03/20 2200   aspirin delayed-release tablet 81 mg, 81 mg, Oral, DAILY, Disha Hawley MD, 81 mg at 11/05/20 9016   arformoterol 15 mcg/budesonide 0.5 mg neb solution, , Nebulization, BID RT, Disha Hawley MD 
  ergocalciferol capsule 50,000 Units, 50,000 Units, Oral, Q7D, Disha Hawley MD, 50,000 Units at 11/05/20 4246   ferrous sulfate tablet 325 mg, 1 Tab, Oral, DAILY WITH BREAKFAST, Disha Hawley MD, 325 mg at 11/05/20 0856 
  gabapentin (NEURONTIN) capsule 300 mg, 300 mg, Oral, DAILY, Disha Hawley MD, Stopped at 11/05/20 0900   hydrOXYzine HCL (ATARAX) tablet 10 mg, 10 mg, Oral, TID PRN, Disha Hawley MD 
  icosapent ethyL (VASCEPA) capsule 1 Cap, 1 Cap, Oral, BID WITH MEALS, Disha Hawley MD, Stopped at 10/31/20 0800   PARoxetine (PAXIL) tablet 30 mg, 30 mg, Oral, DAILY, Disha Hawley MD, 30 mg at 11/05/20 8273   tolterodine (DETROL) tablet 8 mg, 8 mg, Oral, BID, Disha Hawley MD, Stopped at 10/31/20 0900   hydrALAZINE (APRESOLINE) 20 mg/mL injection 5 mg, 5 mg, IntraVENous, Q4H PRN, Disha Hawley MD 
  acetaminophen (TYLENOL) tablet 650 mg, 650 mg, Oral, Q4H PRN, Disha Hawley MD 
 
Thank you for your referral and allowing me to participate in this patient's care. TEA Otto [address], [address] Phone: [phone] Fax: [phone] PATIENT CARE TEAM: 
Patient Care Team: 
Campos Marcano NP as PCP - General (Nurse Practitioner) AHF ATTENDING ADDENDUM Patient was seen and examined in person. Data and notes were reviewed. I have discussed and agree with the plan as noted in the NP note above without further additions. Gabe Zhou MD PhD 
Beatriz Villasenor [address] [facility] [place]

## 2020-11-05 NOTE — PROGRESS NOTES
Her nurse called that she was dizzy with 18 beat VT I start mexiletine 150 mg tid Monitor overnight ICD battery 2 years Lower pacing rate 70 bpm

## 2020-11-05 NOTE — PROGRESS NOTES
Bedside and Verbal shift change report given to [employer] (oncoming nurse) by Ugo Means (offgoing nurse). Report included the following information SBAR, Kardex, Procedure Summary, Intake/Output, MAR, Recent Results and Cardiac Rhythm Paced.

## 2020-11-05 NOTE — PROGRESS NOTES
KWASI-Home with Home Health RUR-25% Moderate CM received home health orders. Placed call to patient to discuss. Patient provided [facility]. CM to follow up in the morning. Transition of Care Plan: The Plan for Transition of Care is related to the following treatment goals: [address] [facility] The Patient was provided with a choice of provider and agrees  with the discharge plan. Yes [x] No [] A Freedom of choice list was provided with basic dialogue that supports the patient's individualized plan of care/goals and shares the quality data associated with the providers. Yes [x] No [] * Marcia Avilez

## 2020-11-05 NOTE — PROGRESS NOTES
Bedside and Verbal shift change report given to [facility] (oncoming nurse) by Teri Light (offgoing nurse). Report included the following information SBAR, Kardex, Procedure Summary, Intake/Output, MAR, Recent Results, and Cardiac Rhythm PACED . 1100: ORTHOS: supine: 76   Sittin  standin 
 
1200: Pt with some runs of VTACH post-walking to bathroom. Pt c/o SOB. [facility] team aware. ..currently in room speaking with pt. Orders given to make MD Samuel aware. 1400: Pt now complaining of dizziness while having VT. MAP: 60. Helped pt back into bed.  
 
1500: MAP: 70. Pt feeling much better. VT also less while pt is in bed. Bedside and Verbal shift change report given to Teri Light (oncoming nurse) by Adriana Balbuena RN (offgoing nurse). Report included the following information SBAR, Kardex, Procedure Summary, Intake/Output, MAR, Recent Results and Cardiac Rhythm PACED. Problem: Diabetes Self-Management Goal: *Disease process and treatment process Description: Define diabetes and identify own type of diabetes; list 3 options for treating diabetes. Outcome: Progressing Towards Goal 
Goal: *Incorporating nutritional management into lifestyle Description: Describe effect of type, amount and timing of food on blood glucose; list 3 methods for planning meals. Outcome: Progressing Towards Goal 
Goal: *Incorporating physical activity into lifestyle Description: State effect of exercise on blood glucose levels. Outcome: Progressing Towards Goal 
Goal: *Developing strategies to promote health/change behavior Description: Define the ABC's of diabetes; identify appropriate screenings, schedule and personal plan for screenings. Outcome: Progressing Towards Goal 
Goal: *Using medications safely Description: State effect of diabetes medications on diabetes; name diabetes medication taking, action and side effects.  
Outcome: Progressing Towards Goal 
  
Problem: Pressure Injury - Risk of 
Goal: *Prevention of pressure injury Description: Document Jaime Scale and appropriate interventions in the flowsheet. Outcome: Progressing Towards Goal 
Note: Pressure Injury Interventions: 
Sensory Interventions: Check visual cues for pain, Avoid rigorous massage over bony prominences, Minimize linen layers, Keep linens dry and wrinkle-free, Maintain/enhance activity level Moisture Interventions: Absorbent underpads, Internal/External urinary devices Activity Interventions: Increase time out of bed, Pressure redistribution bed/mattress(bed type), PT/OT evaluation Mobility Interventions: HOB 30 degrees or less, Pressure redistribution bed/mattress (bed type), PT/OT evaluation Nutrition Interventions: Document food/fluid/supplement intake Friction and Shear Interventions: HOB 30 degrees or less Problem: Heart Failure: Discharge Outcomes Goal: *Demonstrates ability to perform prescribed activity without shortness of breath or discomfort Outcome: Progressing Towards Goal 
Goal: *Left ventricular function assessment completed prior to or during stay, or planned for post-discharge Outcome: Progressing Towards Goal 
Goal: *ACEI prescribed if LVEF less than 40% and no contraindications or ARB prescribed Outcome: Progressing Towards Goal 
Goal: *Verbalizes understanding and describes prescribed diet Outcome: Progressing Towards Goal 
Goal: *Describes available resources and support systems Description: (eg: Home Health, Palliative Care, Advanced Medical Directive) Outcome: Progressing Towards Goal 
  
Problem: Falls - Risk of 
Goal: *Absence of Falls Description: Document Constanza Diaz Fall Risk and appropriate interventions in the flowsheet.  
Outcome: Progressing Towards Goal 
Note: Fall Risk Interventions: 
Mobility Interventions: Communicate number of staff needed for ambulation/transfer, OT consult for ADLs, Patient to call before getting OOB, PT Consult for mobility concerns, PT Consult for assist device competence, Utilize walker, cane, or other assistive device Medication Interventions: Evaluate medications/consider consulting pharmacy, Patient to call before getting OOB, Teach patient to arise slowly Elimination Interventions: Call light in reach, Patient to call for help with toileting needs Problem: Breathing Pattern - Ineffective Goal: *Absence of hypoxia Outcome: Progressing Towards Goal 
Goal: *Use of effective breathing techniques Outcome: Progressing Towards Goal

## 2020-11-05 NOTE — PROGRESS NOTES
[address] [facility] Hospitalist Progress Note [address] [facility],  Answering service: [phone] OR [phone] from in house phone Date of Service:  2020 NAME:  Flaco Davis :  1950 MRN:  [id number] Admission Summary: This is a 78year old female with a significant past medical history of chronic systolic heart failure due to ischemic cardiomyopathy with implanted left ventricular assist device. Other significant PMH:  type 2 diabetes, hypertension, obesity, COPD, depression invasion Interval history / Subjective: Follow up heart failure. Patient seen and examined earlier today. First encounter. During encounter, having recurrent Vtach. Had some intermittent dizziness. EP consulted and initiated mexiletine. Assessment & Plan:  
Acute-on-chronic systolic congestive heart failure, NYHA class IV Ischemic cardiomyopathy- s/p LVAD 
-Transitioned to oral bumex 2 mg daily. Continue Carvedilol, Entresto 
-Daily weight. Strict I&O- Further management by advanced CHF team. 
-S/p RHC 11/3, reduced dose of coreg and entresto, increased speed of VAD- patient felt dizzy and had frequent PVCs, now speed back to previous level. Recurrent Vtach, : 
-Dr. Alec Pollack with EP consulted. Initiated on mexiletine. Suspect sternal wound infection: Antibiotics as above. Cardiac surgery consulted Cellulitis of RLE: background of venous stasis and small wound - ID following, cefepime while inpatient. Plans to discharge on [address] [place] [address] then transition to long term keflex 
- sternum cultures on 10/31: Kamla Krishnamurthy HTN: chronic, stable, Home regimen, PRN BP meds. Monitor Chronic normocytic anemia. Hemoglobin stable between 89, iron/B12/folate wnl Type 2 diabetes with hyperglycemia: SSI- NPH 20 units twice daily. monitor COPD: stable-Continue maintenance inhaled steroids. Bronchodilators as needed. CKD3: avoid nephrotoxics. F/u with nephro op. Cr bumped up, titrating diuretics down Depression, stable. Continue  Paxil Morbid obesityBody mass index is 46.17 kg/m². Counseled on weight loss L Labial hematoma: 2cm ovale bruise, likely hematoma from purewick use. Monitor, f/u op with gynecology if not resolving. Code status: Full code DVT prophylaxis: On warfarin Care Plan discussed with: Patient/Family and Nurse Anticipated Disposition: Home w/Family: 1-2days Hospital Problems  Date Reviewed: 10/28/2020 Codes Class Noted POA  
 NICM (nonischemic cardiomyopathy) ([facility] ICD-10-CM: I42.8 ICD-9-CM: 425.4  11/4/2020 Yes Coronary artery disease involving native coronary artery of native heart without angina pectoris ICD-10-CM: I25.10 ICD-9-CM: 414.01  11/4/2020 Yes JED on CPAP ICD-10-CM: G47.33, Z99.89 ICD-9-CM: 327.23, V46.8  11/4/2020 Yes Chronic venous insufficiency ICD-10-CM: I87.2 ICD-9-CM: 459.81  11/4/2020 Yes Ulcer of right foot, limited to breakdown of skin ([facility] ICD-10-CM: O52.303 ICD-9-CM: 707.15  11/4/2020 Yes * (Principal) Acute on chronic systolic CHF (congestive heart failure) (HCC) ICD-10-CM: N66.47 ICD-9-CM: 428.23, 428.0  10/27/2020 Yes Obesity, morbid ([facility] ICD-10-CM: E66.01 
ICD-9-CM: 278.01  10/1/2020 Yes Review of Systems:  
Negative unless stated above Vital Signs:  
 Last 24hrs VS reviewed since prior progress note. Most recent are: 
Visit Vitals BP (!) 90/58 Pulse 72 Temp 98.1 °F (36.7 °C) Resp 18 Ht 5' 7\" (1.702 m) Wt 133.7 kg (294 lb 12.1 oz) SpO2 96% BMI 46.17 kg/m² Intake/Output Summary (Last 24 hours) at 11/5/2020 1610 Last data filed at 11/5/2020 1230 Gross per 24 hour Intake 1240 ml Output 1500 ml Net -260 ml Physical Examination:  
 
Constitutional:  sitting in chair, on room air. Morbidly obese Cardic: irregularly Lung:    
Clear auscultation bilaterally, no wheeze, no increased work of breathing Sternal  Small wound site without erythema GI:  Obese. LVAD exit to the epigastric area. Soft, non distended, non tender Musculoskeletal:  signs of chronic venous insufficiency. No edema Neurologic:  Mental status:AAOx3, Motor exam:Moves all extremities symmetrically Data Review:  
 Review and/or order of clinical lab test 
Review and/or order of tests in the radiology section of CPT Review and/or order of tests in the medicine section of CPT Labs:  
 
Recent Labs 11/05/20 
0456 11/04/20 
7340 WBC 6.6 6.8 HGB 9.4* 9.2* HCT 31.5* 30.8*  173 Recent Labs 11/05/20 
2985 11/04/20 
0549 11/03/20 
1844 11/03/20 
9146  138  --  138  
K 4.5 4.5 4.7 5.1  103  --  102 CO2 29 29  --  32 BUN 61* 52*  --  47* CREA 2.24* 2.13*  --  2.04* GLU 95 123*  --  121* CA 9.1 9.0  --  8.9 MG 2.6* 2.4 2.2 2.3 Recent Labs 11/05/20 
5983 11/04/20 
0549 11/03/20 
2529 ALT 10* 8* 10* AP 81 89 83 TBILI 0.4 0.3 0.3 TP 7.4 7.3 7.4 ALB 3.0* 2.9* 2.9*  
GLOB 4.4* 4.4* 4.5* Recent Labs 11/05/20 
1008 11/04/20 
0549 11/03/20 
5939 INR 2.3* 2.6* 2.6* PTP 22.8* 25.8* 26.5* APTT  --  51.8* 47.7* No results for input(s): FE, TIBC, PSAT, FERR in the last 72 hours. Lab Results Component Value Date/Time Folate 10.0 10/28/2020 03:56 AM  
  
No results for input(s): PH, PCO2, PO2 in the last 72 hours. Recent Labs 11/04/20 
2039 CPK 70 Lab Results Component Value Date/Time Cholesterol, total 192 10/01/2020 12:00 AM  
 HDL Cholesterol 29 (L) 10/01/2020 12:00 AM  
 LDL Chol Calc (NIH) 123 (H) 10/01/2020 12:00 AM  
 Triglyceride 222 (H) 10/01/2020 12:00 AM  
 
Lab Results Component Value Date/Time  Glucose (POC) 94 11/05/2020 12:11 PM  
 Glucose (POC) 97 11/05/2020 06:57 AM  
 Glucose (POC) 149 (H) 11/04/2020 09:27 PM  
 Glucose (POC) 130 (H) 11/04/2020 04:48 PM  
 Glucose (POC) 131 (H) 11/04/2020 11:33 AM  
 
No results found for: COLOR, APPRN, SPGRU, REFSG, GENOVEVA, PROTU, GLUCU, KETU, BILU, UROU, DAVIE, LEUKU, GLUKE, EPSU, BACTU, WBCU, RBCU, CASTS, UCRY Medications Reviewed:  
 
Current Facility-Administered Medications Medication Dose Route Frequency  warfarin (COUMADIN) tablet 4 mg  4 mg Oral ONCE  
 miconazole (MICOTIN) 2 % powder   Topical BID  mexiletine (MEXITIL) capsule 150 mg  150 mg Oral TID  [START ON 11/6/2020] magnesium oxide (MAG-OX) tablet 400 mg  400 mg Oral DAILY  carvediloL (COREG) tablet 12.5 mg  12.5 mg Oral BID WITH MEALS  
 bumetanide (BUMEX) tablet 2 mg  2 mg Oral DAILY  influenza vaccine 2020-21 (6 mos+)(PF) (FLUARIX/FLULAVAL/FLUZONE QUAD) injection 0.5 mL  0.5 mL IntraMUSCular PRIOR TO DISCHARGE  pneumococcal 23-valent (PNEUMOVAX 23) injection 0.5 mL  0.5 mL IntraMUSCular PRIOR TO DISCHARGE  insulin NPH (NOVOLIN N, HUMULIN N) injection 25 Units  25 Units SubCUTAneous BID  insulin lispro (HUMALOG) injection 6 Units  6 Units SubCUTAneous TIDAC  amLODIPine (NORVASC) tablet 2.5 mg  2.5 mg Oral BID  cefepime (MAXIPIME) 2 g in 0.9% sodium chloride (MBP/ADV) 100 mL  2 g IntraVENous Q24H  
 ammonium lactate (LAC-HYDRIN) 12 % lotion   Topical BID  albuterol (PROVENTIL VENTOLIN) nebulizer solution 2.5 mg  2.5 mg Nebulization BID PRN  
 sodium chloride (NS) flush 5-40 mL  5-40 mL IntraVENous Q8H  
 sodium chloride (NS) flush 5-40 mL  5-40 mL IntraVENous PRN  polyethylene glycol (MIRALAX) packet 17 g  17 g Oral DAILY PRN  
 glucose chewable tablet 16 g  4 Tab Oral PRN  
 glucagon (GLUCAGEN) injection 1 mg  1 mg IntraMUSCular PRN  
 sodium chloride (NS) flush 5-40 mL  5-40 mL IntraVENous Q8H  
 sodium chloride (NS) flush 5-40 mL  5-40 mL IntraVENous PRN  
 Warfarin - Pharmacy to Dose   Other Rx Dosing/Monitoring  dextrose 10% infusion 0-250 mL  0-250 mL IntraVENous PRN  
 insulin lispro (HUMALOG) injection   SubCUTAneous AC&HS  aspirin delayed-release tablet 81 mg  81 mg Oral DAILY  arformoterol 15 mcg/budesonide 0.5 mg neb solution   Nebulization BID RT  
 ergocalciferol capsule 50,000 Units  50,000 Units Oral Q7D  
 ferrous sulfate tablet 325 mg  1 Tab Oral DAILY WITH BREAKFAST  gabapentin (NEURONTIN) capsule 300 mg  300 mg Oral DAILY  hydrOXYzine HCL (ATARAX) tablet 10 mg  10 mg Oral TID PRN  
 icosapent ethyL (VASCEPA) capsule 1 Cap  1 Cap Oral BID WITH MEALS  
 PARoxetine (PAXIL) tablet 30 mg  30 mg Oral DAILY  tolterodine (DETROL) tablet 8 mg  8 mg Oral BID  hydrALAZINE (APRESOLINE) 20 mg/mL injection 5 mg  5 mg IntraVENous Q4H PRN  
 acetaminophen (TYLENOL) tablet 650 mg  650 mg Oral Q4H PRN  
 
______________________________________________________________________ EXPECTED LENGTH OF STAY: 2d 7h 
ACTUAL LENGTH OF STAY:          9 0060 [facility]

## 2020-11-05 NOTE — PROGRESS NOTES
ID Progress Note 
2020 Subjective:  
Denies any discomfort at present time. She is looking forward to going home today. Review of Systems: 
         
Symptom Y/N Comments   Symptom Y/N Comments Fever/Chills n      Chest Pain n      
Poor Appetite       Edema Cough       Abdominal Pain Sputum       Joint Pain SOB/HAIRSTON  n     Pruritis/Rash Nausea/vomit  n     Tolerating PT/OT Diarrhea  n     Tolerating Diet Constipation  n     Other Could NOT obtain due to:    
 
Objective:  
 
Vitals:  
Visit Vitals BP (!) 90/58 Pulse 70 Temp 97.9 °F (36.6 °C) Resp 18 Ht 5' 7\" (1.702 m) Wt 133.7 kg (294 lb 12.1 oz) SpO2 95% BMI 46.17 kg/m² Tmax:  Temp (24hrs), Av.1 °F (36.7 °C), Min:97.9 °F (36.6 °C), Max:98.2 °F (36.8 °C) PHYSICAL EXAM: 
General: Morbidly obese. Alert, cooperative, no acute distress EENT:  EOMI. Anicteric sclerae. MMM Resp:  Clear in apex with decreased breath sounds at bases, no wheezing or rales. No accessory muscle use CV:  Regular  rhythm,  trace of pitting edema, VAD hum, No drainage from sternal wound; size - base of Q-tip GI:  Soft, Non distended, Non tender. +Bowel sounds Neurologic:  Alert and oriented X 3, normal speech, Psych:   Good insight. Not anxious nor agitated Skin:  bumpy, dusky below knees, right anterior wound healing slowly, no drainage, with decreased erythema and edema . No jaundice Labs:  
Lab Results Component Value Date/Time WBC 6.6 2020 04:56 AM  
 HGB 9.4 (L) 2020 04:56 AM  
 HCT 31.5 (L) 2020 04:56 AM  
 PLATELET 046  04:56 AM  
 MCV 86.1 2020 04:56 AM  
 
Lab Results Component Value Date/Time  Sodium 140 2020 04:56 AM  
 Potassium 4.5 2020 04:56 AM  
 Chloride 103 2020 04:56 AM  
 CO2 29 2020 04:56 AM  
 Anion gap 8 2020 04:56 AM  
 Glucose 95 2020 04:56 AM  
 BUN 61 (H) 2020 04:56 AM  
 Creatinine 2.24 (H) 11/05/2020 04:56 AM  
 BUN/Creatinine ratio 27 (H) 11/05/2020 04:56 AM  
 GFR est AA 26 (L) 11/05/2020 04:56 AM  
 GFR est non-AA 22 (L) 11/05/2020 04:56 AM  
 Calcium 9.1 11/05/2020 04:56 AM  
 Bilirubin, total 0.4 11/05/2020 04:56 AM  
 Alk. phosphatase 81 11/05/2020 04:56 AM  
 Protein, total 7.4 11/05/2020 04:56 AM  
 Albumin 3.0 (L) 11/05/2020 04:56 AM  
 Globulin 4.4 (H) 11/05/2020 04:56 AM  
 A-G Ratio 0.7 (L) 11/05/2020 04:56 AM  
 ALT (SGPT) 10 (L) 11/05/2020 04:56 AM  
 
Chest CT (10/27/2020): In the midline inferior to the sternum and expected location of the manubrium 
and apparently socially with skin defect is a subcutaneous oval-shaped density 
measuring between 35 and 54 Hounsfield units. This has well-defined margins 
without adjacent stranding. This could represent high density infected fluid. This could represent granulation tissue this could represent a combination of 
both. This does extend down to the level of the left ventricular assist device 
outflow tract anteriorly. There is no associated gas collection. Slightly more 
superiorly and along the anterior aspect of the outflow tract cannula is a small 
triangular area of soft tissue density measuring 54 Hounsfield units that could 
represent granulation tissue or inflammatory reaction. This is not associated 
with fluid density or air density Assessment and Plan Right lower extremity cellulitis in presence of venous stasis Sternal wound infection 
- blood cx (10/27) no growth  
  wound cx from sternal and right lower extremity wound ; RARE DIPHTHEROIDS & Morganella morganii ssp morganii Continue with IV cefepime; please administer 3pm dose prior to discharge. Start taking Levo 750mg every other day, starting 11/6, last dose should be 11/11/2020.   Reviewed Dr. Goran Hua' note; Keflex 500mg twice a day, starting 11/12/2020 indefinitely as suppressive therapy--this will not resolve the infection if the LVAD is truly infected, however. Continue with daily wound care. LVAD/ICD Acute on chronic systolic heart failure - cardiology following, s/p cardiac cath 11/3 EF 15-20% 
  
Type III DM with neuropathy - A1C 8.4. continue with insulin therapy Continue with neurontin 
  
ROCIO on CKD 
- creat 2.2 ID team singing off. Please contact us with any concerns.  
 
Oly Bowers NP

## 2020-11-05 NOTE — TELEPHONE ENCOUNTER
I called Dr. Adonis Shannon office to schedule patient's appointment. Per Nadira Douglas - patient information will be forwarded to Dr. Adonis Shannon . They will contact patient and set up her appointment.

## 2020-11-05 NOTE — PROGRESS NOTES
[facility] VAD/Heart Failure Progress Note Admit Date: 10/27/2020 POD:  2 Days Post-Op Procedure:  Procedure(s): RIGHT HEART CATH Subjective:  
Minimal sternal drainage Objective:  
Vitals: 
Blood pressure (!) 90/58, pulse 70, temperature 98 °F (36.7 °C), resp. rate 18, height 5' 7\" (1.702 m), weight 294 lb 12.1 oz (133.7 kg), SpO2 93 %. Temp (24hrs), Av °F (36.7 °C), Min:97.9 °F (36.6 °C), Max:98.2 °F (36.8 °C) Hemodynamics: 
 CO:   
 CI:   
 CVP:   
 SVR:   
 PAP Systolic:   
 PAP Diastolic:   
 PVR:   
 OY39:   
 SCV02:   
 
VAD Interrogation: LVAD (Heartmate) Pump Speed (RPM): 9400 Pump Flow (LPM): 4.8 PI (Pulsitility Index): 5.1 Power: 5.7 MAP: 82  Test: Yes 
Back Up  at Bedside & Labeled: Yes Power Module Test: Yes Driveline Site Care: No 
Driveline Dressing: Clean, Dry, and Intact EKG/Rhythm:   
 
Extubation Date / Time:  
 
CT Output:  
 
Ventilator: 
  
 
Oxygen Therapy: 
Oxygen Therapy O2 Sat (%): 93 % (20 0818) Pulse via Oximetry: 70 beats per minute (20 0740) O2 Device: Room air (20 0900) CXR: 
 
Admission Weight: Last Weight Weight: 288 lb 12.8 oz (131 kg) Weight: 294 lb 12.1 oz (133.7 kg) Intake / Output / Drain: 
Current Shift: 701 -  1900 In: 400 [P.O.:200; I.V.:200] Out: - Last 24 hrs.:  
 
Intake/Output Summary (Last 24 hours) at 2020 1149 Last data filed at 2020 0900 Gross per 24 hour Intake 1000 ml Output 1150 ml Net -150 ml Recent Labs 20 
7312 CPK 70 Recent Labs 20 
2825 20 
0549 20 
1844 20 
4957  138  --  138  
K 4.5 4.5 4.7 5.1 CO2 29 29  --  32 BUN 61* 52*  --  47* CREA 2.24* 2.13*  --  2.04* GLU 95 123*  --  121* MG 2.6* 2.4 2.2 2.3 WBC 6.6 6.8  --  6.8 HGB 9.4* 9.2*  --  9.3* HCT 31.5* 30.8*  --  31.5*  173  --  163 Recent Labs   20 
1004 11/04/20 
0549 11/03/20 
5792 INR 2.3* 2.6* 2.6* PTP 22.8* 25.8* 26.5* APTT  --  51.8* 47.7* No lab exists for component: PBNP Current Facility-Administered Medications:  
  warfarin (COUMADIN) tablet 4 mg, 4 mg, Oral, ONCE, CHARLENE [employer] M, DO 
  carvediloL (COREG) tablet 12.5 mg, 12.5 mg, Oral, BID WITH MEALS, Abeba Walter NP, 12.5 mg at 11/05/20 4129   bumetanide (BUMEX) tablet 2 mg, 2 mg, Oral, DAILY, Abeba Waletr NP, 2 mg at 11/05/20 1972   influenza vaccine 2020-21 (6 mos+)(PF) (FLUARIX/FLULAVAL/FLUZONE QUAD) injection 0.5 mL, 0.5 mL, IntraMUSCular, PRIOR TO DISCHARGE, Braxton, Erin B, NP 
  pneumococcal 23-valent (PNEUMOVAX 23) injection 0.5 mL, 0.5 mL, IntraMUSCular, PRIOR TO DISCHARGE, Braxton Triny B, NP 
  insulin NPH (NOVOLIN N, HUMULIN N) injection 25 Units, 25 Units, SubCUTAneous, BID, Braxton, Triny B, NP, 25 Units at 11/05/20 6474   insulin lispro (HUMALOG) injection 6 Units, 6 Units, SubCUTAneous, TIDAC, Braxton Triny B, NP, 6 Units at 11/05/20 0730 
  amLODIPine (NORVASC) tablet 2.5 mg, 2.5 mg, Oral, BID, Mar Walter, NP, 2.5 mg at 11/05/20 9902   magnesium oxide (MAG-OX) tablet 400 mg, 400 mg, Oral, BID, Gina Rastafarian, NP, 400 mg at 11/05/20 7771   cefepime (MAXIPIME) 2 g in 0.9% sodium chloride (MBP/ADV) 100 mL, 2 g, IntraVENous, Q24H, Oly Belle NP, Last Rate: 200 mL/hr at 11/04/20 1533, 2 g at 11/04/20 1533   ammonium lactate (LAC-HYDRIN) 12 % lotion, , Topical, BID, Oly Belle, NP 
  albuterol (PROVENTIL VENTOLIN) nebulizer solution 2.5 mg, 2.5 mg, Nebulization, BID PRN, Disha Hawley MD 
  sodium chloride (NS) flush 5-40 mL, 5-40 mL, IntraVENous, Q8H, Disha Hawley MD, 10 mL at 11/04/20 2236   sodium chloride (NS) flush 5-40 mL, 5-40 mL, IntraVENous, PRN, Disha Hawley MD 
  polyethylene glycol (MIRALAX) packet 17 g, 17 g, Oral, DAILY PRN, Disha Hawley MD 
  glucose chewable tablet 16 g, 4 Tab, Oral, PRN, Disha Hawley MD 
  glucagon (GLUCAGEN) injection 1 mg, 1 mg, IntraMUSCular, PRN, Disha Hawley MD 
  sodium chloride (NS) flush 5-40 mL, 5-40 mL, IntraVENous, Q8H, Teena Nuñez NP, 10 mL at 11/05/20 0700 
  sodium chloride (NS) flush 5-40 mL, 5-40 mL, IntraVENous, PRN, Deyvi Roman NP 
  Warfarin - Pharmacy to Dose, , Other, Rx Dosing/Monitoring, Berna Ward MD 
  dextrose 10% infusion 0-250 mL, 0-250 mL, IntraVENous, PRN, Deyvi Roman NP 
  insulin lispro (HUMALOG) injection, , SubCUTAneous, AC&HS, Deyvi Roman NP, Stopped at 11/03/20 2200   aspirin delayed-release tablet 81 mg, 81 mg, Oral, DAILY, Disha Hawley MD, 81 mg at 11/05/20 1846   arformoterol 15 mcg/budesonide 0.5 mg neb solution, , Nebulization, BID RT, Disha Hawley MD 
  ergocalciferol capsule 50,000 Units, 50,000 Units, Oral, Q7D, Disha Hawley MD, 50,000 Units at 11/05/20 5947   ferrous sulfate tablet 325 mg, 1 Tab, Oral, DAILY WITH BREAKFAST, Disha Hawley MD, 325 mg at 11/05/20 0856 
  gabapentin (NEURONTIN) capsule 300 mg, 300 mg, Oral, DAILY, Disha Hawley MD, Stopped at 11/05/20 0900   hydrOXYzine HCL (ATARAX) tablet 10 mg, 10 mg, Oral, TID PRN, Disha Hawley MD 
  icosapent ethyL (VASCEPA) capsule 1 Cap, 1 Cap, Oral, BID WITH MEALS, Disha Hawley MD, Stopped at 10/31/20 0800   PARoxetine (PAXIL) tablet 30 mg, 30 mg, Oral, DAILY, Disha Hawley MD, 30 mg at 11/05/20 5855   tolterodine (DETROL) tablet 8 mg, 8 mg, Oral, BID, Disha Hawley MD, Stopped at 10/31/20 0900   hydrALAZINE (APRESOLINE) 20 mg/mL injection 5 mg, 5 mg, IntraVENous, Q4H PRN, Disha Hawley MD 
  acetaminophen (TYLENOL) tablet 650 mg, 650 mg, Oral, Q4H PRN, Disha Hawley MD 
 
A/P 
  
S/P LVAD - good flows Need for anticoagulation - coumadin Depression - home meds Chronic wound infection - Abx's  
  
Risk of morbidity and mortality - high Medical decision making - high complexity 
  
 
 
Signed By: Gabriela Roth MD

## 2020-11-05 NOTE — TELEPHONE ENCOUNTER
Verified with Dr. America Mejia that patient device was checked in the hospital and again verified with rep that patient's device has 2 years left on her battery. Patient with appropriately scheduled follow up appointments. Verified patient with two types of identifiers. Spoke with Sonia Armas and notified of above information. Notified Sonia Armas of patient's follow up appointments. Sonia Armas verbalized understanding and will call with any other questions.       Future Appointments   Date Time Provider Mello Tara   11/19/2020  2:50 PM Chrystal Mario MD [facility] BS AMB   2/2/2021  3:20 PM [id number] BS AMB   2/2/2021  4:00 PM MD ROBE Ratliff BS AMB   2/3/2021  2:50 PM Shoaib Cruz MD RDE [place] [address] BS AMB

## 2020-11-05 NOTE — TELEPHONE ENCOUNTER
Jimbo Brock with the [facility] is calling to get the patient scheduled with Dr. Susannah Benjamin for ICD battery replacement. Please advise.     Phone #: [phone]  Thanks

## 2020-11-05 NOTE — WOUND CARE
WOCN Note: Follow-up visit for assessment of abdominal fold, right low leg and buttocks. Assessed in room 458. PPE: face shield, mask and gloves. Chart reviewed. Admitted DX:  Acute on chronic systolic CHF Past Medical History:  
Diagnosis Date  Asthma  Cancer ([facility] breast  
 Cancer ([facility]   
 endometrial  
 Congestive heart failure, unspecified  CRI (chronic renal insufficiency)  Depression  Diabetes ([facility]  Hypertension Assessment:  
Patient is A&O x 4, communicative and turns independently. Bed: foam mattress on total care frame Patient reports no pain. Heels intact without erythema. Sacrum intact without erythema. 1. POA Right low leg, partial thickness wound:  1 x 1.5 x 0.1 cm; 100% red; scant serous exudate; no odor; tino wound intact. [employer] and bordered foam. 
2.  POA Right buttock, mildly raw denuded area:  5 x 5 x 0.1 cm; 2 areas of scant bleeding with an area blanching purple and raised flaky skin. [employer]. 3.  Abdominal fold:  Red moist rash consistent with Candidiasis. Wound, Pressure Prevention & Skin Care Recommendations: 1. Minimize layers of linen/pads under patient to optimize support surface. 2.  Turn/reposition approximately every 2 hours and offload heels. 3.  Manage moisture/ Keep skin folds clean and dry. 4.  Right low leg:  Daily cleanse and apply Opticel AG 
5. Right buttock:  Every 3 days cleanse and apply Marathon. 6.  Abdominal fold:  Miconazole antifungal ointment BID. Discussed above plan with patient and Oscar 6. Transition of Care: Plan to follow as needed while admitted to hospital. 
 
Sosa Xiong, HEMAN RN [facility] Inpatient Wound Care Available on [employer] Pager [phone] Office [phone]

## 2020-11-06 PROBLEM — I47.29 NSVT (NONSUSTAINED VENTRICULAR TACHYCARDIA): Status: ACTIVE | Noted: 2020-01-01

## 2020-11-06 NOTE — PROGRESS NOTES
Cardiac Electrophysiology Hospital Progress Note Referring physician: Dr. Nataly Junior, NP Subjective:  
  
Baljeet Montejo is a 78 y.o. patient who is seen to establish care for management of [address]. She is dizzy today with NSVT episodes, RBBB and superior axis Admitted 10/27/2020 with RLE cellulitis, venous stasis wound, possible sternal wound infection, & acute on chronic CHF. RV & LV failure, is s/p LVAD in 2017 at [facility].  NYHA III chronic systolic CHF. On appropriate GDMT. ICD check during admission showed normal device function, adequate generator longevity. Complained of chest tightness PTA, HAIRSTON, & lower extremity edema. She denies orthopnea. Receiving daptomycin & cefepime for wound infections. Wound culture showed scant Morganella morganii. 100% ventricular pacing on telemetry. Nuclear amyloid scan pending, repeat echo pending. RHC planned for tomorrow. Anticoagulated with warfarin. Previous: 
Echo (10/27/2020): LVEF 15-20%, severely dilated LV, LVAD present. Dilated LA. Mild MR. Treated for abscess of sternal wound since late . LHC (2018): 2 vessel disease with 90% OM, 80% PDA, DSA to PDA branch S/p PCI approx 2017. Reports history of Plavix resistance. LHC (2016): High grade ramus & small PDA disease, borderline disease of LAD & takeoff of pRCA. Previously followed by [facility] at [facility], now sees Dr. Benjamin Ku. Mother with CAD,  age 76. Father  age [de-identified], CAD & MI.  Brother with valve replacement. Problem List  Date Reviewed: 10/28/2020 Codes Class Noted NICM (nonischemic cardiomyopathy) ([facility] ICD-10-CM: I42.8 ICD-9-CM: 425.4  2020 Coronary artery disease involving native coronary artery of native heart without angina pectoris ICD-10-CM: I25.10 ICD-9-CM: 414.01  2020 JED on CPAP ICD-10-CM: G47.33, Z99.89 ICD-9-CM: 327.23, V46.8  2020 Chronic venous insufficiency ICD-10-CM: I87.2 ICD-9-CM: 459.81  11/4/2020 Ulcer of right foot, limited to breakdown of skin ([facility] ICD-10-CM: A48.889 ICD-9-CM: 707.15  11/4/2020 * (Principal) Acute on chronic systolic CHF (congestive heart failure) (HCC) ICD-10-CM: Q56.45 ICD-9-CM: 428.23, 428.0  10/27/2020 Obesity, morbid ([facility] ICD-10-CM: E66.01 
ICD-9-CM: 278.01  10/1/2020 Current Facility-Administered Medications Medication Dose Route Frequency Provider Last Rate Last Dose  miconazole (MICOTIN) 2 % powder   Topical BID Remy KATE DO      
 mexiletine (MEXITIL) capsule 150 mg  150 mg Oral TID Franklin Myles MD   150 mg at 11/05/20 1724  [START ON 11/6/2020] magnesium oxide (MAG-OX) tablet 400 mg  400 mg Oral DAILY Elizabeth Walter NP      
 carvediloL (COREG) tablet 12.5 mg  12.5 mg Oral BID WITH MEALS Elizabeth Walter NP   12.5 mg at 11/05/20 1724  bumetanide (BUMEX) tablet 2 mg  2 mg Oral DAILY Elizabeth Walter NP   2 mg at 11/05/20 9840  influenza vaccine 2020-21 (6 mos+)(PF) (FLUARIX/FLULAVAL/FLUZONE QUAD) injection 0.5 mL  0.5 mL IntraMUSCular PRIOR TO DISCHARGE Triny Limon NP      
 pneumococcal 23-valent (PNEUMOVAX 23) injection 0.5 mL  0.5 mL IntraMUSCular PRIOR TO DISCHARGE Triny Limon NP      
 insulin NPH (NOVOLIN N, HUMULIN N) injection 25 Units  25 Units SubCUTAneous BID Braxton, Triny B NP   25 Units at 11/05/20 1725  
 insulin lispro (HUMALOG) injection 6 Units  6 Units SubCUTAneous TIDAC Triny Limon NP   6 Units at 11/05/20 1725  
 amLODIPine (NORVASC) tablet 2.5 mg  2.5 mg Oral BID Elizabeth Walter NP   2.5 mg at 11/05/20 1724  cefepime (MAXIPIME) 2 g in 0.9% sodium chloride (MBP/ADV) 100 mL  2 g IntraVENous Q24H Oly Belle  mL/hr at 11/05/20 1739 2 g at 11/05/20 1739  ammonium lactate (LAC-HYDRIN) 12 % lotion   Topical BID Oly Belle NP      
 albuterol (PROVENTIL VENTOLIN) nebulizer solution 2.5 mg  2.5 mg Nebulization BID PRN Disha Hawley MD      
 sodium chloride (NS) flush 5-40 mL  5-40 mL IntraVENous Q8H Disha Hawley MD   10 mL at 11/04/20 2236  sodium chloride (NS) flush 5-40 mL  5-40 mL IntraVENous PRN Disha Hawley MD      
 polyethylene glycol (MIRALAX) packet 17 g  17 g Oral DAILY PRN Disha Hawley MD      
 glucose chewable tablet 16 g  4 Tab Oral PRN Disha Hawley MD      
 glucagon (GLUCAGEN) injection 1 mg  1 mg IntraMUSCular PRN Disha Hawley MD      
 sodium chloride (NS) flush 5-40 mL  5-40 mL IntraVENous Q8H Jina OWENS NP   10 mL at 11/05/20 0700  
 sodium chloride (NS) flush 5-40 mL  5-40 mL IntraVENous PRN Jina Villagomez NP      
 Warfarin - Pharmacy to Dose   Other Rx Dosing/Monitoring Gordo Ward MD      
 dextrose 10% infusion 0-250 mL  0-250 mL IntraVENous PRN Jina Villagomez NP      
 insulin lispro (HUMALOG) injection   SubCUTAneous AC&HS Jina Villagomez NP   Stopped at 11/03/20 2200  aspirin delayed-release tablet 81 mg  81 mg Oral DAILY Disha Hawley MD   81 mg at 11/05/20 4125  arformoterol 15 mcg/budesonide 0.5 mg neb solution   Nebulization BID RT Disha Hawley MD      
 ergocalciferol capsule 50,000 Units  50,000 Units Oral Q7D Disha Ramos MD   50,000 Units at 11/05/20 3777  ferrous sulfate tablet 325 mg  1 Tab Oral DAILY WITH BREAKFAST Disha Hawley MD   325 mg at 11/05/20 0856  
 gabapentin (NEURONTIN) capsule 300 mg  300 mg Oral DAILY Stephanie Bell MD   Stopped at 11/05/20 0900  hydrOXYzine HCL (ATARAX) tablet 10 mg  10 mg Oral TID PRN Stephanie Bell MD      
 icosapent ethyL (VASCEPA) capsule 1 Cap  1 Cap Oral BID WITH MEALS Stephanie Bell MD   Stopped at 10/31/20 0800  PARoxetine (PAXIL) tablet 30 mg  30 mg Oral DAILY Disha Hawley MD   30 mg at 11/05/20 4492  tolterodine (DETROL) tablet 8 mg  8 mg Oral BID Marleen, Analia Moya MD   Stopped at 10/31/20 0900  hydrALAZINE (APRESOLINE) 20 mg/mL injection 5 mg  5 mg IntraVENous Q4H PRN Disha Hawley MD      
 acetaminophen (TYLENOL) tablet 650 mg  650 mg Oral Q4H PRN Disha Hawley MD      
 
No Known Allergies Past Medical History:  
Diagnosis Date  Asthma  Cancer ([facility] breast  
 Cancer ([facility]   
 endometrial  
 Congestive heart failure, unspecified  CRI (chronic renal insufficiency)  Depression  Diabetes ([facility]  Hypertension Past Surgical History:  
Procedure Laterality Date  HX HERNIA REPAIR    
 HX HYSTERECTOMY  HX MASTECTOMY History reviewed. No pertinent family history. Social History Tobacco Use  Smoking status: Never Smoker  Smokeless tobacco: Never Used Substance Use Topics  Alcohol use: Not Currently Review of Systems: Review of all other systems otherwise negative. Constitutional: Negative for fever, chills, weight loss, + malaise/fatigue. HEENT: Negative for nosebleeds, vision changes. Respiratory: Negative for cough, hemoptysis. Cardiovascular: Negative for chest pain, palpitations, orthopnea, claudication, leg swelling, + near syncope, and no PND. Gastrointestinal: Negative for nausea, vomiting, diarrhea, blood in stool and melena. Genitourinary: Negative for dysuria, and hematuria. Musculoskeletal: Negative for myalgias, arthralgia. Skin: Negative for rash. Heme: Does not bleed or bruise easily. Neurological: Negative for speech change and focal weakness Objective:  
 
Visit Vitals BP (!) 90/58 Pulse 72 Temp 98.1 °F (36.7 °C) Resp 18 Ht 5' 7\" (1.702 m) Wt 294 lb 12.1 oz (133.7 kg) SpO2 96% BMI 46.17 kg/m² Physical Exam:  
Constitutional: Well-developed and well-nourished. No respiratory distress. Head: Normocephalic and atraumatic. Eyes: Pupils are equal, round. ENT: Hearing grossly normal. 
Neck: Supple. No JVD present.   
Cardiovascular: LVAD hum. Exam reveals no gallop and no friction rub. No murmur heard. Pulmonary/Chest: Effort normal and breath sounds normal. No wheezes. Abdominal: Soft, no tenderness. Morbidly obese. Musculoskeletal: Moves extremities independently. Vasc/lymphatic: + 1 bilateral edema Neurological: Alert,oriented. Skin: Sternal wound, RLE wound with dressing C/D/I. Psychiatric: Normal mood and affect. Behavior is normal. Judgment and thought content normal.   
 
 
Assessment/Plan: Ms. Sola Cummings has complicated cardiac history, is admitted with bilateral heart failure (NYHA IV on admission), LVEF 15-20%, sternal wound infection, RLE venous stasis wound. [address] with proper function & adequate longevity. 2 year battery Pacing rate is 70 bpm 
Added mexiletine 150 mg tid today for NSVT and dizziness related to it She is RV pacing LVAD. Continue appropriate GDMT as managed by Dr. Leon Lieberman' team. 
 
Continue management of wound infections per Infectious Disease. Continue anticoagulation with warfarin. Thank you for involving me in this patient's care and please call with further concerns or questions. Sp Hodge M.D. Electrophysiology/Cardiology [facility], [place] [address] [place] [address] 
[phone]

## 2020-11-06 NOTE — PROGRESS NOTES
0730:Bedside shift change report given to Tessa Alexandre and Axel Dempsey (oncoming nurse) by Sarmad Swan (offgoing nurse). Report included the following information SBAR, Kardex, [facility], Recent Results and Cardiac Rhythm Paced. 1930:Bedside shift change report given to Miko Pickens (oncoming nurse) by Tessa Alexandre (offgoing nurse). Report included the following information SBAR, Kardex, [facility], Recent Results and Cardiac Rhythm Paced. Problem: Diabetes Self-Management Goal: *Disease process and treatment process Description: Define diabetes and identify own type of diabetes; list 3 options for treating diabetes. Outcome: Progressing Towards Goal 
Goal: *Incorporating nutritional management into lifestyle Description: Describe effect of type, amount and timing of food on blood glucose; list 3 methods for planning meals. Outcome: Progressing Towards Goal 
Goal: *Incorporating physical activity into lifestyle Description: State effect of exercise on blood glucose levels. Outcome: Progressing Towards Goal 
Goal: *Developing strategies to promote health/change behavior Description: Define the ABC's of diabetes; identify appropriate screenings, schedule and personal plan for screenings. Outcome: Progressing Towards Goal 
Goal: *Using medications safely Description: State effect of diabetes medications on diabetes; name diabetes medication taking, action and side effects. Outcome: Progressing Towards Goal 
Goal: *Monitoring blood glucose, interpreting and using results Description: Identify recommended blood glucose targets  and personal targets. Outcome: Progressing Towards Goal 
Goal: *Prevention, detection, treatment of acute complications Description: List symptoms of hyper- and hypoglycemia; describe how to treat low blood sugar and actions for lowering  high blood glucose level. Outcome: Progressing Towards Goal 
Goal: *Prevention, detection and treatment of chronic complications Description: Define the natural course of diabetes and describe the relationship of blood glucose levels to long term complications of diabetes. Outcome: Progressing Towards Goal 
Goal: *Developing strategies to address psychosocial issues Description: Describe feelings about living with diabetes; identify support needed and support network Outcome: Progressing Towards Goal 
Goal: *Insulin pump training Outcome: Progressing Towards Goal 
Goal: *Sick day guidelines Outcome: Progressing Towards Goal 
Goal: *Patient Specific Goal (EDIT GOAL, INSERT TEXT) Outcome: Progressing Towards Goal 
  
Problem: Patient Education: Go to Patient Education Activity Goal: Patient/Family Education Outcome: Progressing Towards Goal 
  
Problem: Pressure Injury - Risk of 
Goal: *Prevention of pressure injury Description: Document Jaime Scale and appropriate interventions in the flowsheet. Outcome: Progressing Towards Goal 
Note: Pressure Injury Interventions: 
Sensory Interventions: Check visual cues for pain, Avoid rigorous massage over bony prominences, Minimize linen layers, Keep linens dry and wrinkle-free, Maintain/enhance activity level Moisture Interventions: Absorbent underpads, Internal/External urinary devices Activity Interventions: Increase time out of bed, Pressure redistribution bed/mattress(bed type), PT/OT evaluation Mobility Interventions: Pressure redistribution bed/mattress (bed type), PT/OT evaluation Nutrition Interventions: Document food/fluid/supplement intake, Offer support with meals,snacks and hydration Friction and Shear Interventions: HOB 30 degrees or less Problem: Patient Education: Go to Patient Education Activity Goal: Patient/Family Education Outcome: Progressing Towards Goal 
  
Problem: Discharge Planning Goal: *Discharge to safe environment Outcome: Progressing Towards Goal 
  
Problem: Heart Failure: Discharge Outcomes Goal: *Demonstrates ability to perform prescribed activity without shortness of breath or discomfort Outcome: Progressing Towards Goal 
Goal: *Left ventricular function assessment completed prior to or during stay, or planned for post-discharge Outcome: Progressing Towards Goal 
Goal: *ACEI prescribed if LVEF less than 40% and no contraindications or ARB prescribed Outcome: Progressing Towards Goal 
Goal: *Verbalizes understanding and describes prescribed diet Outcome: Progressing Towards Goal 
Goal: *Verbalizes understanding/describes prescribed medications Outcome: Progressing Towards Goal 
Goal: *Describes available resources and support systems Description: (eg: Home Health, Palliative Care, Advanced Medical Directive) Outcome: Progressing Towards Goal 
Goal: *Describes smoking cessation resources Outcome: Progressing Towards Goal 
Goal: *Understands and describes signs and symptoms to report to providers(Stroke Metric) Outcome: Progressing Towards Goal 
Goal: *Describes/verbalizes understanding of follow-up/return appt Description: (eg: to physicians, diabetes treatment coordinator, and other resources Outcome: Progressing Towards Goal 
Goal: *Describes importance of continuing daily weights and changes to report to physician Outcome: Progressing Towards Goal 
  
Problem: Falls - Risk of 
Goal: *Absence of Falls Description: Document Steven Powers Fall Risk and appropriate interventions in the flowsheet. Outcome: Progressing Towards Goal 
Note: Fall Risk Interventions: 
Mobility Interventions: OT consult for ADLs, Patient to call before getting OOB, PT Consult for mobility concerns Medication Interventions: Patient to call before getting OOB, Teach patient to arise slowly Elimination Interventions: Call light in reach, Patient to call for help with toileting needs Problem: Patient Education: Go to Patient Education Activity Goal: Patient/Family Education Outcome: Progressing Towards Goal 
  
Problem: Breathing Pattern - Ineffective Goal: *Absence of hypoxia Outcome: Progressing Towards Goal 
Goal: *Use of effective breathing techniques Outcome: Progressing Towards Goal 
Goal: *PALLIATIVE CARE:  Alleviation of Dyspnea Outcome: Progressing Towards Goal 
  
Problem: Breathing Pattern - Ineffective Goal: *Absence of hypoxia Outcome: Progressing Towards Goal 
Goal: *Use of effective breathing techniques Outcome: Progressing Towards Goal 
Goal: *PALLIATIVE CARE:  Alleviation of Dyspnea Outcome: Progressing Towards Goal 
  
Problem: Patient Education: Go to Patient Education Activity Goal: Patient/Family Education Outcome: Progressing Towards Goal

## 2020-11-06 NOTE — PROGRESS NOTES
NUTRITION Reason for Rescreen: Provider Consult and LOS       
RECOMMENDATIONS:  
Continue cardiac diet with avoidance of high potassium foods at home Interventions  
- diet order adjusted to: consistent CHO, 2200kcal, 3-4 g Na, 1800ml FR 
- diet education provided Information obtained from:   patient Past Medical History:  
Diagnosis Date  Asthma  Cancer ([facility] breast  
 [facility]   
 endometrial  
 Congestive heart failure, unspecified  CRI (chronic renal insufficiency)  Depression  Diabetes [facility]  Hypertension Pt visited for rescreen and diet education. Heart Failure Team requesting low potassium diet ed. Borderline elevated potassium noted but still WNL. Diet:  cardiac, consistent CHO, 2gm sodium 1800ml FR Supplements: None Meal intake:  
Patient Vitals for the past 168 hrs: 
 % Diet Eaten 11/06/20 0830 100 % 11/05/20 1600 100 % 11/05/20 0900 0 % 11/04/20 0809 100 % 11/03/20 0841 0 % 11/02/20 1430 100 % 11/02/20 1241 90 % 11/02/20 0900 100 % 11/01/20 0908 90 % 10/31/20 1613 90 % 10/31/20 1200 90 % 10/31/20 0832 95 % 10/30/20 1530 100 % Weight Changes:  
Gain Wt Readings from Last 10 Encounters:  
11/06/20 134.2 kg (295 lb 13.7 oz) 10/27/20 131 kg (288 lb 12.8 oz) 10/27/20 135.6 kg (299 lb) 10/01/20 131.1 kg (289 lb) Nutrition-Related Concerns Identified: 
None PLAN:  
- Follow wt trends Will revisit per policy Veronica eMyer, RD [address] [place] , Pager #[phone] or via [email or web address]

## 2020-11-06 NOTE — PROGRESS NOTES
KWASI-Home with home health RUR-24% Moderate Patient is planned for discharge tomorrow. [facility] pending with [facility]  to monitor.   
 
Kesha Hung MS

## 2020-11-06 NOTE — PROGRESS NOTES
[address]81 [facility] [place] [facility] [facility] [place] in [address], [place] Heart Failure Inpatient Progress Note Patient name: Abdirashid Gutierrez Patient : 1950 Patient MRN: [id number] Date of service: 20 CHIEF COMPLAINT: 
Cellulitis 
  
PLAN: 
[address] 11/3 shows CVP 14 mmHg, PA 36/18 mmHg, PCWP 15 mmHg, Sal CI 2.4 Continue current device speed increased to 9400 with low speed limit of 9000; intolerant to higher speed due to VT Appreciate Dr. Mariama Velasco input re: higher PPM rate (70 bpm) and VT; continue mexiletine PYP strongly suggestive of aTTR amyloidosis; repeat gammopathy pending, and if negative, will consider addition of tafamidis as OP (will require patient assistance) Continue Coreg 12.5 mg PO BID; intolerant of higher dose due to RV failure Intolerant of ACEi/ARB/ARNI due to aTTR Continue Norvasc 2.5 mg PO BID No spironolactone due to hyperkalemia Bumex 2 mg PO daily D/C mg ox Patient states she has reaction to allopurinol (leg edema), will hold Continue synthroid, TSH & Free T4 at goal 
Vtamin D,  high dose weekly x 12 weeks Chronic anticoagulation with coumadin, dosage per pharmacy (goal 2-2.5) Continue baby ASA Patient not taking statin, retrieve medical records for reason Continue CPAP for JED Antibiotics per ID; plan to d/c on PO levaquin and will require lifelong suppression with Keflex Sternal wound infection per CTS; continue medical management for now DTC consult recs appreciated, HgbA1C -8.4% NPH and SSI per DTC recs Ambulate daily/PT/OT Nutrition consult ordered GI, urogynecology, and pulmonary consults as OP Note: Needs multiple referrals prior to hospital discharge: GYN and urogynecologist, pulmonologist, ; also patient needs flu shot and review records if has pneumonia vaccine at [facility]: 
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 Iris Riggins at [facility]) · C/b delayed extubation due to severe COPD 
· C/b critical illness polyneuropathy · C/b prolonged hospitalization post-LVAD, 55 days · C/b sternal wound infection s/p debridement (by Dr. Iris Riggins) s/p wound vac · C/b sacral ulcer · Would culture positive for Staph aureus, not MRSA · C/b LVAD site drainage, improved S/p CRT-ICD · ICD fired due to electrolyte imbalance (2011) H/o breast cancer (1992) · s/p bilateral mastectomy/chemo and endometrial cancer s/p hysterectomy · Lymphedema of LLE due to cancer treatment Severe COPD with FEV1 50% Depression Atrial fibrillation H/o \"two mini strokes\" S/p fall with hip facture · Right hip hemmiarthroplasty (5/23/18) by Dr. Meredith Soares) · S/p removed hip hardwarare due to pain (4/15/19) COPD severe CKD, stage 3 Hyperkalemia Pulmonary hypertension Cardiac risk factors: · Morbid obesity, Body mass index is 46.34 kg/m². · DM2 insulin dependent · JED on CPAP 
· HL Urinary incontinence, severe · no procedures due to anticoagulation · conservative management with Detrol 4 pills bid Endometrial cancer (2002) HTN 
HL 
aTTR amyloidosis  
  
CARDIAC IMAGING: 
Echo (9/24/19) LVEF 20-25%, AV opens 1:1, no AR Echo (5/29/18) LVEF 10-%, ramps study done, report in epic Echo (1/9/18) ramp study done, LVEDD 7.1cm LHC (11/9/18) 2 vessel disease with 90% OM, 80% PDA, DSA to PDA branch 
TTE (10/27/20) LVEF 15-20% LVIDd 7.26cm RVIDd 2.82cm Tapse 1.14cm 
  
HEMODYNAMICS: 
RHC not done CPEST not done 6MW not done 
  
OTHER IMAGING: 
EGD/C-scope (4/17/19) no active bleeding and polyp removed. 
  FAMILY HISTORY: 
Mother  76 years, diagnosed with DM, HTN, CAD Father  [de-identified]years old, HTN, CAD, MI 
Positive for DM and heart disease in the family, brother with valve replacement 
  
SOCIAL HISTORY: 
Never alcohol, never smoked, , lives alone, no illicit drug use, lives in house, ambulatory status indpendedn, children: daughter, occupation retired Lives alone. 
  
ALLERGIES: benzodiazepines and quetiapine fumerate (lethargy, resp failure to both) 
  
HISTORY OF PRESENT ILLNESS: 
Briefly, Raf Pena is a 78 y.o. female with multiple comorbidities listed above presents to our clinic requesting transfer of care from [facility] LVAD [facility]. 
  
REVIEW OF SYSTEMS: 
General: Denies fever Ear, nose and throat: Denies difficulty hearing, sinus problems, runny nose, post-nasal drip, ringing in ears, mouth sores, loose teeth, ear pain, nosebleeds, sore throate, facial pain or numbess Cardiovascular: see above in the interval history Respiratory: denies cough, wheezing, sputum production, or hemoptysis. Gastrointestinal: Denies heartburn, constipation, intolerance to certain foods, diarrhea, abdominal pain, nausea, vomiting, difficulty swallowing, blood in stool Kidney and bladder: Denies painful urination, frequent urination, or urgency Musculoskeletal: Denies joint pain, muscle weakness Skin and hair: Denies change in hair loss or increase, breast changes Redness and edema, skin breakdown RLE. PHYSICAL EXAM: 
Visit Vitals BP (!) 90/58 Pulse 70 Temp 97.9 °F (36.6 °C) Resp 18 Ht 5' 7\" (1.702 m) Wt 295 lb 13.7 oz (134.2 kg) SpO2 94% BMI 46.34 kg/m² LVAD Pump Speed (RPM): 9400 Pump Flow (LPM): 4.6 MAP: 86 
PI (Pulsitility Index): 5.6 Power: 5.5  Test: No 
Back Up  at Bedside & Labeled: Yes Power Module Test: No 
Driveline Site Care: No 
Driveline Dressing: Clean, Dry, and Intact Outpatient: No 
MAP in Therapeutic Range (Outpatient): Yes Testing Alarms Reviewed: Yes 
Back up SC speed: 9400 Back up Low Speed Limit: 9200 Emergency Equipment with Patient?: Yes Emergency procedures reviewed?: Yes Drive line site inspected?: Yes Drive line intergrity inspected?: Yes Drive line dressing changed?: No 
 
 
General: Patient is morbidly obese in no acute distress, sitting up in chair HEENT: Normocephalic and atraumatic. No scleral icterus. Pupils are equal, round and reactive to light and accomodation. No conjunctival injection. Oropharynx is clear. Neck: Supple. No evidence of thyroid enlargements or lymphadenopathy. JVD: Cannot be appreciated Lungs: Breath sounds are equal and clear bilaterally. No wheezes, rhonchi, or rales. Heart: VAD hum Abdomen: Soft, obese, no mass or tenderness. No organomegaly or hernia. Bowel sounds present. Genitourinary and rectal: deferred Extremities: No cyanosis, clubbing, Redness and edema, skin breakdown RLE; BLE edema 2+, venous stasis Neurologic: No focal sensory or motor deficits are noted. Grossly intact. Psychiatric: Awake, alert and oriented x 3. Appropriate mood and affect. Skin: Warm, dry, no nodules + petechiae on extremities PAST MEDICAL HISTORY: 
Past Medical History:  
Diagnosis Date  Asthma  Cancer ([facility] breast  
 Cancer ([facility]   
 endometrial  
 Congestive heart failure, unspecified  CRI (chronic renal insufficiency)  Depression  Diabetes ([facility]  Hypertension PAST SURGICAL HISTORY: 
Past Surgical History:  
Procedure Laterality Date  HX HERNIA REPAIR    
 HX HYSTERECTOMY  HX MASTECTOMY FAMILY HISTORY: 
History reviewed. No pertinent family history. SOCIAL HISTORY: 
Social History Socioeconomic History  Marital status:  Spouse name: Not on file  Number of children: Not on file  Years of education: Not on file  Highest education level: Not on file Tobacco Use  Smoking status: Never Smoker  Smokeless tobacco: Never Used Substance and Sexual Activity  Alcohol use: Not Currently LABORATORY RESULTS: 
Labs Latest Ref Rng & Units 11/6/2020 11/5/2020 11/4/2020 11/3/2020 11/3/2020 11/2/2020 11/1/2020 WBC 3.6 - 11.0 K/uL 6.4 6.6 6.8 - 6.8 7.6 6.9  
RBC 3.80 - 5.20 M/uL 3.82 3.66(L) 3.59(L) - 3.64(L) 3.68(L) 3.77(L) Hemoglobin 11.5 - 16.0 g/dL 9.7(L) 9.4(L) 9.2(L) - 9. 3(L) 9.4(L) 9.5(L) Hematocrit 35.0 - 47.0 % 32. 4(L) 31. 5(L) 30. 8(L) - 31. 5(L) 31. 8(L) 32. 1(L) MCV 80.0 - 99.0 FL 84.8 86.1 85.8 - 86.5 86.4 85.1 Platelets 646 - 862 K/uL 183 170 173 - 163 194 196 Lymphocytes 12 - 49 % - - - - - - - Monocytes 5 - 13 % - - - - - - - Eosinophils 0 - 7 % - - - - - - - Basophils 0 - 1 % - - - - - - - Albumin 3.5 - 5.0 g/dL 3. 0(L) 3.0(L) 2. 9(L) - 2. 9(L) 3. 1(L) 3. 2(L) Calcium 8.5 - 10.1 MG/DL 9.5 9.1 9.0 - 8.9 8.8 9.1 Glucose 65 - 100 mg/dL 108(H) 95 123(H) - 121(H) 185(H) 148(H) BUN 6 - 20 MG/DL 64(H) 61(H) 52(H) - 47(H) 43(H) 35(H) Creatinine 0.55 - 1.02 MG/DL 2.23(H) 2.24(H) 2.13(H) - 2.04(H) 2.14(H) 1.82(H) Sodium 136 - 145 mmol/L 137 140 138 - 138 140 138 Potassium 3.5 - 5.1 mmol/L 4.8 4.5 4.5 4.7 5.1 4.0 3.8 TSH 0.450 - 4.500 uIU/mL - - - - - - -  
LDH 81 - 246 U/L 391(H) 312(H) 329(H) - 517(H) 321(H) 319(H) Some recent data might be hidden ALLERGY: 
No Known Allergies CURRENT MEDICATIONS: 
 
Current Facility-Administered Medications:  
  warfarin (COUMADIN) tablet 4 mg, 4 mg, Oral, ONCE, CHARLENE [employer] M, DO 
  miconazole (MICOTIN) 2 % powder, , Topical, BID, CHARLENE Ventura M, DO 
  mexiletine (MEXITIL) capsule 150 mg, 150 mg, Oral, TID, Vasiliy Karimi MD, 150 mg at 11/06/20 1507   carvediloL (COREG) tablet 12.5 mg, 12.5 mg, Oral, BID WITH MEALS, Janine Walter NP, 12.5 mg at 11/06/20 1372   bumetanide (BUMEX) tablet 2 mg, 2 mg, Oral, DAILY, Janine Walter NP, 2 mg at 11/06/20 2790   influenza vaccine 2020-21 (6 mos+)(PF) (FLUARIX/FLULAVAL/FLUZONE QUAD) injection 0.5 mL, 0.5 mL, IntraMUSCular, PRIOR TO DISCHARGE, Triny Limon, NP 
  pneumococcal 23-valent (PNEUMOVAX 23) injection 0.5 mL, 0.5 mL, IntraMUSCular, PRIOR TO DISCHARGE, Braxton Triny B, NP 
  insulin NPH (NOVOLIN N, HUMULIN N) injection 25 Units, 25 Units, SubCUTAneous, BID, Braxton Triny B, NP, 25 Units at 11/06/20 9670   insulin lispro (HUMALOG) injection 6 Units, 6 Units, SubCUTAneous, TIDAC, Braxton, Triny B, NP, 6 Units at 11/06/20 1138   amLODIPine (NORVASC) tablet 2.5 mg, 2.5 mg, Oral, BID, Polliard, Rosalie [place] T, NP, 2.5 mg at 11/06/20 6436   cefepime (MAXIPIME) 2 g in 0.9% sodium chloride (MBP/ADV) 100 mL, 2 g, IntraVENous, Q24H, Oly Belle, NP, Last Rate: 200 mL/hr at 11/06/20 1507, 2 g at 11/06/20 1507   ammonium lactate (LAC-HYDRIN) 12 % lotion, , Topical, BID, Oly Belle, TEA 
  albuterol (PROVENTIL VENTOLIN) nebulizer solution 2.5 mg, 2.5 mg, Nebulization, BID PRN, Disha Hawley MD 
  sodium chloride (NS) flush 5-40 mL, 5-40 mL, IntraVENous, Q8H, Disha Hawley MD, 10 mL at 11/06/20 1507   sodium chloride (NS) flush 5-40 mL, 5-40 mL, IntraVENous, PRN, Disha Hawley MD 
  polyethylene glycol (MIRALAX) packet 17 g, 17 g, Oral, DAILY PRN, Disha Hawley MD 
  glucose chewable tablet 16 g, 4 Tab, Oral, PRN, Disha Hawley MD 
  glucagon (GLUCAGEN) injection 1 mg, 1 mg, IntraMUSCular, PRN, Josh White MD 
  Warfarin - Pharmacy to Dose, , Other, Rx Dosing/Monitoring, Russel Ward MD 
  dextrose 10% infusion 0-250 mL, 0-250 mL, IntraVENous, PRN, Cr Yeboah NP 
  insulin lispro (HUMALOG) injection, , SubCUTAneous, AC&HS, Cr Yeboah NP, 2 Units at 11/06/20 0973   aspirin delayed-release tablet 81 mg, 81 mg, Oral, DAILY, Disha Hawley MD, 81 mg at 11/06/20 7277   arformoterol 15 mcg/budesonide 0.5 mg neb solution, , Nebulization, BID RT, Disha Hawley MD 
  ergocalciferol capsule 50,000 Units, 50,000 Units, Oral, Q7D, Disha Hawley MD, 50,000 Units at 11/05/20 9079   ferrous sulfate tablet 325 mg, 1 Tab, Oral, DAILY WITH BREAKFAST, Disha Hawley MD, 325 mg at 11/06/20 0903 
  gabapentin (NEURONTIN) capsule 300 mg, 300 mg, Oral, DAILY, Disha Hawley MD, 300 mg at 11/06/20 9014   hydrOXYzine HCL (ATARAX) tablet 10 mg, 10 mg, Oral, TID PRN, Disha Hawley MD 
  icosapent ethyL (VASCEPA) capsule 1 Cap, 1 Cap, Oral, BID WITH MEALS, Disha Hawley MD, Stopped at 10/31/20 0800   PARoxetine (PAXIL) tablet 30 mg, 30 mg, Oral, DAILY, Disha Hawley MD, 30 mg at 11/06/20 6804   tolterodine (DETROL) tablet 8 mg, 8 mg, Oral, BID, Disha Hawley MD, Stopped at 10/31/20 0900   hydrALAZINE (APRESOLINE) 20 mg/mL injection 5 mg, 5 mg, IntraVENous, Q4H PRN, Disha Hawley MD 
  acetaminophen (TYLENOL) tablet 650 mg, 650 mg, Oral, Q4H PRN, Disha Hawley MD 
 
Thank you for your referral and allowing me to participate in this patient's care. TEA [address] [place] [address], [address] Phone: [phone] Fax: [phone] PATIENT CARE TEAM: 
Patient Care Team: 
Claudia Ku NP as PCP - General (Nurse Practitioner) AHF ATTENDING ADDENDUM Patient was seen and examined in person. Data and notes were reviewed. I have discussed and agree with the plan as noted in the NP note above without further additions. Judith Molina MD PhD 
Beatriz Villasenor [address] [place] [address]

## 2020-11-06 NOTE — PROGRESS NOTES
0730: Bedside and Verbal shift change report given to Zeke (oncoming nurse) by Joaquín Garcia (offgoing nurse). Report included the following information SBAR, Kardex, Procedure Summary, Intake/Output, MAR, Recent Results and Cardiac Rhythm Paced. Problem: Pressure Injury - Risk of 
Goal: *Prevention of pressure injury Description: Document Jaime Scale and appropriate interventions in the flowsheet. Outcome: Progressing Towards Goal 
Note: Pressure Injury Interventions: 
Sensory Interventions: Check visual cues for pain, Avoid rigorous massage over bony prominences, Minimize linen layers, Keep linens dry and wrinkle-free, Maintain/enhance activity level Moisture Interventions: Absorbent underpads, Assess need for specialty bed, Internal/External urinary devices, Maintain skin hydration (lotion/cream) Activity Interventions: Increase time out of bed, Pressure redistribution bed/mattress(bed type), PT/OT evaluation Mobility Interventions: Pressure redistribution bed/mattress (bed type), PT/OT evaluation, Turn and reposition approx. every two hours(pillow and wedges) Nutrition Interventions: Document food/fluid/supplement intake Friction and Shear Interventions: HOB 30 degrees or less Problem: Heart Failure: Day 5 Goal: Diagnostic Test/Procedures Outcome: Progressing Towards Goal 
Note: Labs drawn as ordered and monitored for results. Goal: Medications Outcome: Progressing Towards Goal 
Goal: Respiratory Outcome: Progressing Towards Goal 
Goal: Treatments/Interventions/Procedures Outcome: Progressing Towards Goal 
Goal: Psychosocial 
Outcome: Progressing Towards Goal

## 2020-11-06 NOTE — PROGRESS NOTES
Physical Therapy Chart reviewed, RN cleared for activity. Noted episodes of dizziness and nausea. Received patient sitting up in the chair with eyes closed, easily wakes. Patient politely declined to participate in activity citing fatigue and dizziness. Of note, patient voiced she has not walked in the woods outside of therapy visits. Encouraged patient to allow staff to assist her with this and to perform later this evening if able. D/w RN. For the weekend, recommend with nursing patient to complete as able in order to maintain strength, endurance and independence once Egress Test complete: OOB to chair 3x/day and walking daily with walker assist. Thank you for your assistance.

## 2020-11-06 NOTE — PROGRESS NOTES
6818 [facility] Adult  Hospitalist Group Hospitalist Progress Note 4146 [facility],  Answering service: [phone] OR [phone] from in house phone Date of Service:  2020 NAME:  Carly Herndon :  1950 MRN:  [id number] Admission Summary: This is a 78year old female with a significant past medical history of chronic systolic heart failure due to ischemic cardiomyopathy with implanted left ventricular assist device. Other significant PMH:  type 2 diabetes, hypertension, obesity, COPD, depression invasion Interval history / Subjective: Follow up heart failure. Patient seen and examined. Doing well. No recurrent Vtach. Assessment & Plan:  
Acute-on-chronic systolic congestive heart failure, NYHA class IV Ischemic cardiomyopathy- s/p LVAD 
-Transitioned to oral bumex 2 mg daily. Continue Carvedilol, Entresto 
-Daily weight. Strict I&O- Further management by advanced CHF team. 
-S/p RHC 11/3, reduced dose of coreg and entresto, increased speed of VAD- patient felt dizzy and had frequent PVCs, now speed back to previous level. Recurrent Vtach, : 
-Dr. Scott Wheeler with EP consulted. Initiated on mexiletine. Will follow recommendations. Suspect sternal wound infection: 
Cellulitis of RLE: background of venous stasis and small wound - ID following, cefepime while inpatient. Plans to discharge on [address] [place] [address] Road then transition to long term keflex 
- sternum cultures on 10/31: Bennie Juliana HTN: chronic, stable, Home regimen, PRN BP meds. Monitor Chronic normocytic anemia. Hemoglobin stable between 89, iron/B12/folate wnl Type 2 diabetes with hyperglycemia: SSI- NPH 25 units twice daily, 6 units premeal, monitor COPD: stable-Continue maintenance inhaled steroids. Bronchodilators as needed. CKD3: avoid nephrotoxics. F/u with nephro op.  Cr bumped up, titrating diuretics down Depression, stable. Continue  Paxil Morbid obesityBody mass index is 46.34 kg/m². Counseled on weight loss L Labial hematoma: 2cm ovale bruise, likely hematoma from purewick use. Monitor, f/u op with gynecology if not resolving. Code status: Full code DVT prophylaxis: On warfarin Care Plan discussed with: Patient/Family and Nurse Anticipated Disposition: Home w/Family: 1-2days Hospital Problems  Date Reviewed: 10/28/2020 Codes Class Noted POA  
 NICM (nonischemic cardiomyopathy) ([facility] ICD-10-CM: I42.8 ICD-9-CM: 425.4  11/4/2020 Yes Coronary artery disease involving native coronary artery of native heart without angina pectoris ICD-10-CM: I25.10 ICD-9-CM: 414.01  11/4/2020 Yes JED on CPAP ICD-10-CM: G47.33, Z99.89 ICD-9-CM: 327.23, V46.8  11/4/2020 Yes Chronic venous insufficiency ICD-10-CM: I87.2 ICD-9-CM: 459.81  11/4/2020 Yes Ulcer of right foot, limited to breakdown of skin ([facility] ICD-10-CM: R75.425 ICD-9-CM: 707.15  11/4/2020 Yes * (Principal) Acute on chronic systolic CHF (congestive heart failure) (HCC) ICD-10-CM: M51.15 ICD-9-CM: 428.23, 428.0  10/27/2020 Yes Obesity, morbid ([facility] ICD-10-CM: E66.01 
ICD-9-CM: 278.01  10/1/2020 Yes Review of Systems:  
Negative unless stated above Vital Signs:  
 Last 24hrs VS reviewed since prior progress note. Most recent are: 
Visit Vitals BP (!) 90/58 Pulse 73 Temp 98 °F (36.7 °C) Resp 18 Ht 5' 7\" (1.702 m) Wt 134.2 kg (295 lb 13.7 oz) SpO2 98% BMI 46.34 kg/m² Intake/Output Summary (Last 24 hours) at 11/6/2020 1237 Last data filed at 11/6/2020 1892 Gross per 24 hour Intake 1640 ml Output 1200 ml Net 440 ml Physical Examination:  
 
Constitutional:  sitting in chair, on room air. Morbidly obese Cardic: irregularly Lung:    
Clear auscultation bilaterally, no wheeze, no increased work of breathing Sternal  Small wound site without erythema GI:  Obese. LVAD exit to the epigastric area. Soft, non distended, non tender Musculoskeletal:  signs of chronic venous insufficiency. No edema Neurologic:  Mental status:AAOx3, Motor exam:Moves all extremities symmetrically Data Review:  
 Review and/or order of clinical lab test 
Review and/or order of tests in the radiology section of CPT Review and/or order of tests in the medicine section of CPT Labs:  
 
Recent Labs 11/06/20 
3970 11/05/20 
1368 WBC 6.4 6.6 HGB 9.7* 9.4* HCT 32.4* 31.5*  170 Recent Labs 11/06/20 
4473 11/05/20 
0456 11/04/20 
5527  140 138  
K 4.8 4.5 4.5  
 103 103 CO2 28 29 29 BUN 64* 61* 52* CREA 2.23* 2.24* 2.13* * 95 123* CA 9.5 9.1 9.0 MG 2.5* 2.6* 2.4 Recent Labs 11/06/20 
0384 11/05/20 
0456 11/04/20 
6464 ALT 8* 10* 8* AP 84 81 89 TBILI 0.4 0.4 0.3 TP 7.4 7.4 7.3 ALB 3.0* 3.0* 2.9*  
GLOB 4.4* 4.4* 4.4* Recent Labs 11/06/20 
4914 11/05/20 
1008 11/04/20 
5654 INR  --  2.3* 2.6* PTP  --  22.8* 25.8* APTT 48.6*  --  51.8* No results for input(s): FE, TIBC, PSAT, FERR in the last 72 hours. Lab Results Component Value Date/Time Folate 10.0 10/28/2020 03:56 AM  
  
No results for input(s): PH, PCO2, PO2 in the last 72 hours. Recent Labs 11/04/20 
2743 CPK 70 Lab Results Component Value Date/Time Cholesterol, total 192 10/01/2020 12:00 AM  
 HDL Cholesterol 29 (L) 10/01/2020 12:00 AM  
 LDL Chol Calc (NIH) 123 (H) 10/01/2020 12:00 AM  
 Triglyceride 222 (H) 10/01/2020 12:00 AM  
 
Lab Results Component Value Date/Time  Glucose (POC) 186 (H) 11/06/2020 11:05 AM  
 Glucose (POC) 125 (H) 11/06/2020 06:43 AM  
 Glucose (POC) 180 (H) 11/05/2020 09:40 PM  
 Glucose (POC) 119 (H) 11/05/2020 04:23 PM  
 Glucose (POC) 94 11/05/2020 12:11 PM  
 
No results found for: COLOR, APPRN, SPGRU, REFSG, GENOVEVA, PROTU, GLUCU, Pascual Noun, BILU, Rachelle Reagin, DAVIE, Rianna January, [facility], EPSU, BACTU, WBCU, RBCU, CASTS, UCRY Medications Reviewed:  
 
Current Facility-Administered Medications Medication Dose Route Frequency  warfarin (COUMADIN) tablet 4 mg  4 mg Oral ONCE  
 miconazole (MICOTIN) 2 % powder   Topical BID  mexiletine (MEXITIL) capsule 150 mg  150 mg Oral TID  carvediloL (COREG) tablet 12.5 mg  12.5 mg Oral BID WITH MEALS  
 bumetanide (BUMEX) tablet 2 mg  2 mg Oral DAILY  influenza vaccine 2020-21 (6 mos+)(PF) (FLUARIX/FLULAVAL/FLUZONE QUAD) injection 0.5 mL  0.5 mL IntraMUSCular PRIOR TO DISCHARGE  pneumococcal 23-valent (PNEUMOVAX 23) injection 0.5 mL  0.5 mL IntraMUSCular PRIOR TO DISCHARGE  insulin NPH (NOVOLIN N, HUMULIN N) injection 25 Units  25 Units SubCUTAneous BID  insulin lispro (HUMALOG) injection 6 Units  6 Units SubCUTAneous TIDAC  amLODIPine (NORVASC) tablet 2.5 mg  2.5 mg Oral BID  cefepime (MAXIPIME) 2 g in 0.9% sodium chloride (MBP/ADV) 100 mL  2 g IntraVENous Q24H  
 ammonium lactate (LAC-HYDRIN) 12 % lotion   Topical BID  albuterol (PROVENTIL VENTOLIN) nebulizer solution 2.5 mg  2.5 mg Nebulization BID PRN  
 sodium chloride (NS) flush 5-40 mL  5-40 mL IntraVENous Q8H  
 sodium chloride (NS) flush 5-40 mL  5-40 mL IntraVENous PRN  polyethylene glycol (MIRALAX) packet 17 g  17 g Oral DAILY PRN  
 glucose chewable tablet 16 g  4 Tab Oral PRN  
 glucagon (GLUCAGEN) injection 1 mg  1 mg IntraMUSCular PRN  
 sodium chloride (NS) flush 5-40 mL  5-40 mL IntraVENous Q8H  
 sodium chloride (NS) flush 5-40 mL  5-40 mL IntraVENous PRN  
 Warfarin - Pharmacy to Dose   Other Rx Dosing/Monitoring  dextrose 10% infusion 0-250 mL  0-250 mL IntraVENous PRN  
 insulin lispro (HUMALOG) injection   SubCUTAneous AC&HS  aspirin delayed-release tablet 81 mg  81 mg Oral DAILY  arformoterol 15 mcg/budesonide 0.5 mg neb solution   Nebulization BID RT  
 ergocalciferol capsule 50,000 Units  50,000 Units Oral Q7D  
 ferrous sulfate tablet 325 mg  1 Tab Oral DAILY WITH BREAKFAST  gabapentin (NEURONTIN) capsule 300 mg  300 mg Oral DAILY  hydrOXYzine HCL (ATARAX) tablet 10 mg  10 mg Oral TID PRN  
 icosapent ethyL (VASCEPA) capsule 1 Cap  1 Cap Oral BID WITH MEALS  
 PARoxetine (PAXIL) tablet 30 mg  30 mg Oral DAILY  tolterodine (DETROL) tablet 8 mg  8 mg Oral BID  hydrALAZINE (APRESOLINE) 20 mg/mL injection 5 mg  5 mg IntraVENous Q4H PRN  
 acetaminophen (TYLENOL) tablet 650 mg  650 mg Oral Q4H PRN  
 
______________________________________________________________________ EXPECTED LENGTH OF STAY: 2d 7h 
ACTUAL LENGTH OF STAY:          10 Frandy Willams DO

## 2020-11-06 NOTE — PROGRESS NOTES
Progress Note Patient: Reece Ramirez MRN: [id number]  SSN: [id number] YOB: 1950  Age: 78 y.o. Sex: female Admit Date: 10/27/2020 LOS: 10 days Subjective:  
 
Reece Ramirez is a 78 y.o. female admitted on 10/27/2020  for non-ischemic cardiomyopathy,  acute on chronic systolic CHF (congestive heart failure) ([facility] [address] [I50.23]. Has past medical history of chronic systolic CHF status post AICD, cardiogenic shock, s/p impella bridge to LVAD, post LVAD implantation (4/15/2017 by Dr. Kashif Dimas), RV failure, CAD (left heart catheterization 8/2016 with high-grade ramus and small PDA disease borderline disease of LAD and takeoff of pRCA), A. fib, pulmonary hypertension, JED on CPAP, diabetes type 2, HLD, severe COPD, CKD, morbid obesity, hypertension, breast cancer status post bilateral mastectomy/chemo, and endometrial cancer status post hysterectomy, lymphedema left lower extremity, severe COPD. Had previously been followed by advanced heart failure at [facility] but now followed by Dr. Beau Jones. Reports she has been being treated for abscess of sternal wound since late 2019. She presented on 10/32 2020 with right lower extremity cellulitis and sternal wound infection is suspected. ID is following and patient is on daptomycin. Cardiology has been asked to perform right heart cath and to establish general cardiology care. Patient is on chronic anticoagulation for LVAD with Coumadin. Patient denies any history of MI. She does report history of Plavix resistance. Her daughter reports patient has coronary stent she believes in perhaps 2017. Patient reports she is able to walk in the hallways without chest pain does develop shoulder pain which she attributes to leaning on walker. Has intermittent shortness of breath. Currently denies chest pain shortness of breath. States she is able to lay flat. No recent history of AICD firing. Some dizziness and nausea this morning.  No chest pain or shortness of breath. Thinks it may be new medication (mexiletine). 
  
 
Objective:  
 
Vitals:  
 20 0518 20 0830 20 0954 20 1111 BP:      
Pulse: 70 75  73 Resp: 16 22  18 Temp: 98.1 °F (36.7 °C) 98.1 °F (36.7 °C)  98 °F (36.7 °C) SpO2: 95% 95% 95% 98% Weight:      
Height:      
  
Temp (24hrs), Av.1 °F (36.7 °C), Min:98 °F (36.7 °C), Max:98.1 °F (36.7 °C) Intake and Output: 
Current Shift: 701 - 1900 In: 700 [P.O.:700] Out: 200 [Urine:200] Last three shifts: 1901 - 700 In: 2180 [P.O.:1880; I.V.:300] Out: 2500 [Urine:2500] Physical Exam: Obese General:  Alert, cooperative, well nourished, well developed, appears stated age Eyes:  Conjunctivae/corneas clear Nose: Nares normal. Septum midline. Mucosa normal. No drainage or sinus tenderness. Neck: Supple, symmetrical, trachea midline, no JVD Lungs:   Clear to auscultation bilaterally, good effort Heart:  Regular rate and rhythm, no murmur, click, rub, or gallop Abdomen:   Soft, non-tender, bowel sounds normal, non-distended Extremities: 2+ edema with stasis dermatitis Skin: Skin color, texture, turgor normal.  
Neurologic: Non focal 
  
 
Current Facility-Administered Medications:  
  warfarin (COUMADIN) tablet 4 mg, 4 mg, Oral, ONCE, CHARLENE [employer] M, DO 
  miconazole (MICOTIN) 2 % powder, , Topical, BID, CHARLENE [employer] M, DO 
  mexiletine (MEXITIL) capsule 150 mg, 150 mg, Oral, TID, Mariaa Greer MD, 150 mg at 20 7925   carvediloL (COREG) tablet 12.5 mg, 12.5 mg, Oral, BID WITH MEALS, Rosalie Walter NP, 12.5 mg at 20 0758   bumetanide (BUMEX) tablet 2 mg, 2 mg, Oral, DAILY, Rosalie Walter NP, 2 mg at 20 3450   influenza vaccine 2020- (6 mos+)(PF) (FLUARIX/FLULAVAL/FLUZONE QUAD) injection 0.5 mL, 0.5 mL, IntraMUSCular, PRIOR TO DISCHARGE, Triny Limon NP 
  pneumococcal 23-valent (PNEUMOVAX 23) injection 0.5 mL, 0.5 mL, IntraMUSCular, PRIOR TO DISCHARGE, Everardo Limonin B, NP 
  insulin NPH (NOVOLIN N, HUMULIN N) injection 25 Units, 25 Units, SubCUTAneous, BID, Braxton, Triny B, NP, 25 Units at 11/06/20 2767   insulin lispro (HUMALOG) injection 6 Units, 6 Units, SubCUTAneous, TIDAC, Braxton, Triny B, NP, 6 Units at 11/06/20 1138   amLODIPine (NORVASC) tablet 2.5 mg, 2.5 mg, Oral, BID, Polliard, [place] Monk T, NP, 2.5 mg at 11/06/20 9343   cefepime (MAXIPIME) 2 g in 0.9% sodium chloride (MBP/ADV) 100 mL, 2 g, IntraVENous, Q24H, Oly Belle, NP, Last Rate: 200 mL/hr at 11/05/20 1739, 2 g at 11/05/20 1739   ammonium lactate (LAC-HYDRIN) 12 % lotion, , Topical, BID, Oly Belle, NP 
  albuterol (PROVENTIL VENTOLIN) nebulizer solution 2.5 mg, 2.5 mg, Nebulization, BID PRN, Disha Hawley MD 
  sodium chloride (NS) flush 5-40 mL, 5-40 mL, IntraVENous, Q8H, Disha Hawley MD, 10 mL at 11/05/20 2142   sodium chloride (NS) flush 5-40 mL, 5-40 mL, IntraVENous, PRN, Disha Hawley MD 
  polyethylene glycol (MIRALAX) packet 17 g, 17 g, Oral, DAILY PRN, Disha Hawley MD 
  glucose chewable tablet 16 g, 4 Tab, Oral, PRN, Disha Hawley MD 
  glucagon (GLUCAGEN) injection 1 mg, 1 mg, IntraMUSCular, PRN, Disha Hawley MD 
  sodium chloride (NS) flush 5-40 mL, 5-40 mL, IntraVENous, Q8H, Ezio Beets E, NP, 10 mL at 11/06/20 8580   sodium chloride (NS) flush 5-40 mL, 5-40 mL, IntraVENous, PRN, [place] Beets, NP 
  Warfarin - Pharmacy to Dose, , Other, Rx Dosing/Monitoring, Fide Ward MD 
  dextrose 10% infusion 0-250 mL, 0-250 mL, IntraVENous, PRN, Ezio Delaney NP 
  insulin lispro (HUMALOG) injection, , SubCUTAneous, AC&HS, Ezio Delaney NP, 2 Units at 11/06/20 6667   aspirin delayed-release tablet 81 mg, 81 mg, Oral, DAILY, Disha Hawley MD, 81 mg at 11/06/20 3910   arformoterol 15 mcg/budesonide 0.5 mg neb solution, , Nebulization, BID RT, Lise Purcell MD 
  ergocalciferol capsule 50,000 Units, 50,000 Units, Oral, Q7D, Disha Hawley MD, 50,000 Units at 11/05/20 7122   ferrous sulfate tablet 325 mg, 1 Tab, Oral, DAILY WITH BREAKFAST, Disha Hawley MD, 325 mg at 11/06/20 0903 
  gabapentin (NEURONTIN) capsule 300 mg, 300 mg, Oral, DAILY, Disha Hawley MD, 300 mg at 11/06/20 6279   hydrOXYzine HCL (ATARAX) tablet 10 mg, 10 mg, Oral, TID PRN, Disha Hawley MD 
  icosapent ethyL (VASCEPA) capsule 1 Cap, 1 Cap, Oral, BID WITH MEALS, Disha Hawley MD, Stopped at 10/31/20 0800   PARoxetine (PAXIL) tablet 30 mg, 30 mg, Oral, DAILY, Disha Hawley MD, 30 mg at 11/06/20 3865   tolterodine (DETROL) tablet 8 mg, 8 mg, Oral, BID, Disha Hawley MD, Stopped at 10/31/20 0900   hydrALAZINE (APRESOLINE) 20 mg/mL injection 5 mg, 5 mg, IntraVENous, Q4H PRN, Disha Hawley MD 
  acetaminophen (TYLENOL) tablet 650 mg, 650 mg, Oral, Q4H PRN, Lise Purcell MD 
 
Lab/Data Review: 
Labs Latest Ref Rng & Units 11/6/2020 11/5/2020 11/4/2020 11/3/2020 11/3/2020 11/2/2020 11/1/2020 WBC 3.6 - 11.0 K/uL 6.4 6.6 6.8 - 6.8 7.6 6.9  
RBC 3.80 - 5.20 M/uL 3.82 3.66(L) 3.59(L) - 3.64(L) 3.68(L) 3.77(L) Hemoglobin 11.5 - 16.0 g/dL 9.7(L) 9.4(L) 9.2(L) - 9. 3(L) 9.4(L) 9.5(L) Hematocrit 35.0 - 47.0 % 32. 4(L) 31. 5(L) 30. 8(L) - 31. 5(L) 31. 8(L) 32. 1(L) MCV 80.0 - 99.0 FL 84.8 86.1 85.8 - 86.5 86.4 85.1 Platelets 390 - 435 K/uL 183 170 173 - 163 194 196 Lymphocytes 12 - 49 % - - - - - - - Monocytes 5 - 13 % - - - - - - - Eosinophils 0 - 7 % - - - - - - - Basophils 0 - 1 % - - - - - - - Albumin 3.5 - 5.0 g/dL 3. 0(L) 3.0(L) 2. 9(L) - 2. 9(L) 3. 1(L) 3. 2(L) Calcium 8.5 - 10.1 MG/DL 9.5 9.1 9.0 - 8.9 8.8 9.1 Glucose 65 - 100 mg/dL 108(H) 95 123(H) - 121(H) 185(H) 148(H) BUN 6 - 20 MG/DL 64(H) 61(H) 52(H) - 47(H) 43(H) 35(H) Creatinine 0.55 - 1.02 MG/DL 2.23(H) 2.24(H) 2.13(H) - 2.04(H) 2.14(H) 1.82(H) Sodium 136 - 145 mmol/L 137 140 138 - 138 140 138 Potassium 3.5 - 5.1 mmol/L 4.8 4.5 4.5 4.7 5.1 4.0 3.8 TSH 0.450 - 4.500 uIU/mL - - - - - - -  
LDH 81 - 246 U/L 391(H) 312(H) 329(H) - 517(H) 321(H) 319(H) Some recent data might be hidden 10/27/20 ECHO ADULT COMPLETE 11/02/2020 11/2/2020 Narrative · LVAD speed 9400-AV opens each beat, MV with decreased excursion LVIDd  
6.2 cm LVIDs 5.8 cm · LV: Estimated LVEF is 15 - 20%. · LA: Mildly dilated left atrium. · AV: Sclerosis with no significant stenosis. · RV: Mildly dilated right ventricle. Mildly reduced systolic function. Pacer/ICD present. · Image quality for this study was technically difficult. Signed by: Augusta Kwong MD  
 
 
10/27/20 CARDIAC PROCEDURE 11/03/2020 11/3/2020 Narrative · CO/CI = 5.7/2.2 (Sal) · Wedge mean = 15 mmHg. · LVAD in situ 6Fr right internal jugular Vascade closure to facilitate manual pressure INR 2.6 on warfarin Tolerated well. Signed by: Danielle Canchola MD  
 
 
Assessment:  
 
Principal Problem: 
  Acute on chronic systolic CHF (congestive heart failure) ([facility] [address] (10/27/2020) Active Problems: 
  Obesity, morbid ([facility] [address] (10/1/2020) NICM (nonischemic cardiomyopathy) ([facility] [address] (11/4/2020) Coronary artery disease involving native coronary artery of native heart without angina pectoris (11/4/2020) JED on CPAP (11/4/2020) Chronic venous insufficiency (11/4/2020) Ulcer of right foot, limited to breakdown of skin ([facility] [address] (11/4/2020) NSVT (nonsustained ventricular tachycardia) ([facility] [address] (11/6/2020) Plan: 1. Acute on chronic systolic CHF and RV failure/Cardiomyopathy; Non-ischemic Cardiomyopathy -NYHA IV 
            -s/p LVAD 2017  
            -s/p ICD  
            -Management per AHF  
            -Coreg, entresto, bumex 
  
2. Hx of CAD:  
             -[facility] 8/2016 with high-grade ramus and small PDA disease borderline disease of LAD and takeoff of pRCA) -Request records from [facility]. ?hx of stents. -CAD Appears stable 
            -continue ASA, not on statin PTA (await records for reason) 
  
3. Venous stasis, RLE venous stasis wound, RLE cellulitis 
            -plan outpatient venous ultrasound for reflux evaluation 4. Hx of Afib 
            -currently v paced 
            -on Coumadin for LVAD  
  
5. HLD: , HDL 29. Consider statin/ await records 
  6. Hx of AICD: Thinker Thing device 
            -Dr. Scott Wheeler to establish care/follow up 
            -Had PPM interrogated this admission- normal pacing and sensing.  
  
7. Sternal wound infection 
            -chronic antibiotics 8. NS-VT 
 - Started Mexiteline. 
 - Nausea and dizziness- May need time to adjust to medication. Signed By: Latesha Cortez MD   
 November 6, 2020 Interventional Cardiology [facility] [address], [address] P: [phone] F: [phone]

## 2020-11-06 NOTE — DIABETES MGMT
[address] [facility] PROGRAM FOR DIABETES HEALTH 
 
DIABETES FOLLOW UP NOTE Presentation Flaco Davis is a 78 y.o. female who was seen at the outpatient [facility] 1721 for follow of on chronic systolic heart failure. In the clinic she was noted to be in volume overload with concern for cellulitis. She was admitted for IV antibiotics and diuresis. HX: Chronic systolic heart failure s/p LVAD implantation with Heartmate II, Breast Cancer (1992) s/p bilateral mastectomy, endometrial cancer s/p hysterectomy, severe COPD, Depression, A Fib, CVA, s/p R hemiarthroplasty, CKD, pulmonary HTN, Hyperlipidemia, Diabetes DX: RV Failure, volume overload, RLE cellulitis, chronic sternal wound infection TX: ID consult, CT surgery for sternal wound consult, Diuresis Current clinical course has been uncomplicated. Diabetes: Patient has known Type two diabetes, treated with Levemir PTA. A1C 8.4% on admission. Family history positive for diabetes. Consulted by Provider for advanced diabetes nursing assessment and care, specifically related to  
[x] Inpatient management strategy [x] Home management assessment Diabetes-related medical history Acute complications: None Neurological complications Peripheral neuropathy Microvascular disease Nephropathy Macrovascular disease Cerebral vascular accident Diabetes medication history Drug class Currently in use Discontinued Never used Biguanide DDP-4 inhibitor Sulfonylurea Thiazolidinedione GLP-1 RA SGLT-2 inhibitors Basal insulin Levemir 40 units at bedtime Fixed Dose  Combinations Bolus insulin Subjective 'I feel so dizzy\" Patient with intermittent runs of PVCs- LVAD speeds and medications adjusted Admitting A1C 8.4% Glucose 125-186 NPH 25 units BID 
6 units Humalog with meals 2 units correctional insulin in the past 24 hours Appetite  good GFR 22 
IV antibiotics started, cultures growing lasha rosario Objective Physical exam 
General Alert, oriented and in no acute distress/ill-appearing. Conversant and cooperative. Vital Signs Visit Vitals BP (!) 90/58 Pulse 73 Temp 98 °F (36.7 °C) Resp 18 Ht 5' 7\" (1.702 m) Wt 134.2 kg (295 lb 13.7 oz) SpO2 98% BMI 46.34 kg/m² Skin  Warm and dry. Acanthosis noted along neckline. No lipohypertrophy or lipoatrophy noted at injection sites Heart   Regular rate and rhythm. No murmurs, rubs or gallops Lungs  Clear to auscultation without rales or rhonchi Extremities Sacral Wound POA, small healing sternal wound, RLE cellulitis Laboratory Lab Results Component Value Date/Time Hemoglobin A1c 8.4 (H) 10/28/2020 03:30 AM  
 
Lab Results Component Value Date/Time LDL Chol Calc (NIH) 123 (H) 10/01/2020 12:00 AM  
 
Lab Results Component Value Date/Time Creatinine 2.23 (H) 11/06/2020 05:23 AM  
 
Lab Results Component Value Date/Time Sodium 137 11/06/2020 05:23 AM  
 Potassium 4.8 11/06/2020 05:23 AM  
 Chloride 102 11/06/2020 05:23 AM  
 CO2 28 11/06/2020 05:23 AM  
 Anion gap 7 11/06/2020 05:23 AM  
 Glucose 108 (H) 11/06/2020 05:23 AM  
 BUN 64 (H) 11/06/2020 05:23 AM  
 Creatinine 2.23 (H) 11/06/2020 05:23 AM  
 BUN/Creatinine ratio 29 (H) 11/06/2020 05:23 AM  
 GFR est AA 26 (L) 11/06/2020 05:23 AM  
 GFR est non-AA 22 (L) 11/06/2020 05:23 AM  
 Calcium 9.5 11/06/2020 05:23 AM  
 Bilirubin, total 0.4 11/06/2020 05:23 AM  
 Alk. phosphatase 84 11/06/2020 05:23 AM  
 Protein, total 7.4 11/06/2020 05:23 AM  
 Albumin 3.0 (L) 11/06/2020 05:23 AM  
 Globulin 4.4 (H) 11/06/2020 05:23 AM  
 A-G Ratio 0.7 (L) 11/06/2020 05:23 AM  
 ALT (SGPT) 8 (L) 11/06/2020 05:23 AM  
 
Lab Results Component Value Date/Time ALT (SGPT) 8 (L) 11/06/2020 05:23 AM  
 
 
Factors affecting BG pattern Factor Dose Comments Nutrition: 
Carb-controlled meals 60 grams/meal On cardiac consistent carb diet Drugs: 
IV antibiotics On Zosyn, Vancomycin Pain Sacral pain Infection Sternal wound infection RLE cellulitis Other: CKD GFR 31 Blood glucose pattern Assessment and Plan Nursing Diagnosis Risk for unstable blood glucose pattern Nursing Intervention Domain 5253 Decision-making Support Nursing Interventions Examined current inpatient diabetes control Explored factors facilitating and impeding inpatient management Identified self-management practices impeding diabetes control Explored corrective strategies with patient and responsible inpatient provider Informed patient of rational for insulin strategy while hospitalized Instructed patient in correlation with elevated glucose and wound heeling, A1C and long term complications Evaluation Ezequiel Amin is a 78year old female with uncontrolled diabetes on once daily Levemir with LVAD who is admitted for RV failure, volume overload, RLE cellulitis and work-up for chronic sternal wound infection. A1C on admission is elevated at 8.4% and patient reports once daily glucose monitoring. Glucose certainly impacted by impaired insulin delivery due to perfusion, chronic infection and oral intake. Discussed the impact of elevated glucose on wound healing. It will be essential for her to have glucose control at this time due to current wounds and preventions of additional long term complications. Basal, bolus and correctional insulin started and glucose in goal of 100-180 in the inpatient setting. Recommendations Recommend: 1. Continue basal insulin: NPH 25 units BID (0.2 units/kg/BID). 2. Correctional insulin ACHS at resistant sensitivity, start at a glucose of 200 
 
3. Continue low dose bolus insulin. 0.05 units/kg/meal: 6 units Humalog with meals. Hold if patient consumes less than 50% of carbohydrates on meal tray. Can increase up to 14 units with meals as needed. On Discharge: 1.  Levemir 25 units BID 2. Patient to obtain glucose more frequently than fasting. Please check before meals and bedtime to ensure a rise in glucose throughout the day. A1C goal 7-7.5% 2. Patient needs a FUV with PCP within 1-2 weeks of hospital discharge for ongoing diabetes management. Patient does prefer to be seen by PCP which is reasonable at this time. If patient does not have glucose control at PCP, may benefit from specialty care. Billing Code(s) I personally reviewed chart, notes, data and current medications in the medical record, and examined the patient at bedside before making care recommendations. Thank you for including us in their care. I spent 15 minutes in direct patient care today for this patient. Time includes chart review, face to face with patient and collaboration with interdisciplinary care team. 
 
[facility] Access via [address] [place] [address] [phone]

## 2020-11-07 NOTE — PROGRESS NOTES
[address] [facility] in [place], [place] Heart Failure Inpatient Progress Note Patient name: Kai Ross Patient : 1950 Patient MRN: [id number] Date of service: 20 CHIEF COMPLAINT: 
Cellulitis 
  
PLAN: 
[address] 11/3 shows CVP 14 mmHg, PA 36/18 mmHg, PCWP 15 mmHg, Sal CI 2.4 Continue current device speed increased to 9400 with low speed limit of 9000; intolerant to higher speed due to VT Appreciate Dr. Estella Rodriguez input re: higher PPM rate (70 bpm) and VT; continue mexiletine PYP strongly suggestive of aTTR amyloidosis; repeat gammopathy pending, and if negative, will consider addition of tafamidis as OP (will require patient assistance) Continue Coreg 12.5 mg PO BID; intolerant of higher dose due to RV failure Intolerant of ACEi/ARB/ARNI due to aTTR Continue Norvasc 2.5 mg PO BID No spironolactone due to hyperkalemia Bumex 2 mg PO daily Albumin 12.5mg IV x1 Patient states she has reaction to allopurinol (leg edema), will hold Continue synthroid, TSH & Free T4 at goal 
Vtamin D, high dose weekly x 12 weeks Chronic anticoagulation with coumadin, dosage per pharmacy (goal 2-2.5) Continue baby ASA Patient not taking statin, retrieve medical records for reason Continue CPAP for JED Antibiotics per ID; plan to d/c on PO levaquin and will require lifelong suppression with Keflex Sternal wound infection per CTS; continue medical management for now DTC consult recs appreciated, HgbA1C -8.4% NPH and SSI per DTC recs Ambulate daily/PT/OT Nutrition consult ordered GI, urogynecology, and pulmonary consults as OP Plan for discharge home today and follow-up in [facility] clinic on 20 Note: Needs multiple referrals prior to hospital discharge: GYN and urogynecologist, pulmonologist, ; also patient needs flu shot and [facility] 
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 Shavonne Sosa at [facility]) · C/b delayed extubation due to severe COPD 
· C/b critical illness polyneuropathy · C/b prolonged hospitalization post-LVAD, 55 days · C/b sternal wound infection s/p debridement (by Dr. Shavonne Sosa) s/p wound vac · C/b sacral ulcer · Would culture positive for Staph aureus, not MRSA · C/b LVAD site drainage, improved S/p CRT-ICD · ICD fired due to electrolyte imbalance (2011) H/o breast cancer (1992) · s/p bilateral mastectomy/chemo and endometrial cancer s/p hysterectomy · Lymphedema of LLE due to cancer treatment Severe COPD with FEV1 50% Depression Atrial fibrillation H/o \"two mini strokes\" S/p fall with hip facture · Right hip hemmiarthroplasty (5/23/18) by Dr. Diane Townsend) · S/p removed hip hardwarare due to pain (4/15/19) COPD severe CKD, stage 3 Hyperkalemia Pulmonary hypertension Cardiac risk factors: · Morbid obesity, Body mass index is 44.89 kg/m². · DM2 insulin dependent · JED on CPAP 
· HL Urinary incontinence, severe · no procedures due to anticoagulation · conservative management with Detrol 4 pills bid Endometrial cancer (2002) HTN 
HL 
aTTR amyloidosis  
  
CARDIAC IMAGING: 
Echo (9/24/19) LVEF 20-25%, AV opens 1:1, no AR Echo (5/29/18) LVEF 10-%, ramps study done, report in [facility] (1/9/18) ramp study done, LVEDD 7.1cm [facility] (11/9/18) 2 vessel disease with 90% OM, 80% PDA, DSA to PDA branch 
TTE (10/27/20) LVEF 15-20% LVIDd 7.26cm RVIDd 2.82cm Tapse 1.14cm 
  
HEMODYNAMICS: 
RHC not done CPEST not done 6MW not done   
OTHER IMAGING: 
EGD/C-scope (19) no active bleeding and polyp removed. 
  
FAMILY HISTORY: 
Mother  76 years, diagnosed with DM, HTN, CAD Father  [de-identified]years old, HTN, CAD, MI 
Positive for DM and heart disease in the family, brother with valve replacement 
  
SOCIAL HISTORY: 
Never alcohol, never smoked, , lives alone, no illicit drug use, lives in house, ambulatory status indpendedn, children: daughter, occupation retired Lives alone. 
  
ALLERGIES: benzodiazepines and quetiapine fumerate (lethargy, resp failure to both) 
  
HISTORY OF PRESENT ILLNESS: 
Briefly, Nataly Murphy is a 78 y.o. female with multiple comorbidities listed above presents to our clinic requesting transfer of care from [facility] LVAD program. 
  
REVIEW OF SYSTEMS: 
General: Denies fever, feels better today Ear, nose and throat: Denies difficulty hearing, sinus problems, runny nose, post-nasal drip, ringing in ears, mouth sores, loose teeth, ear pain, nosebleeds, sore throate, facial pain or numbess Cardiovascular: see above in the interval history Respiratory: denies cough, wheezing, sputum production, or hemoptysis. Gastrointestinal: Denies heartburn, constipation, intolerance to certain foods, diarrhea, abdominal pain, nausea, vomiting, difficulty swallowing, blood in stool Kidney and bladder: Denies painful urination, frequent urination, or urgency Musculoskeletal: Denies joint pain, muscle weakness, +hand tremors Skin and hair: Denies change in hair loss or increase, breast changes Redness and edema, skin breakdown RLE. PHYSICAL EXAM: 
Visit Vitals BP (!) 90/58 Pulse 70 Temp 98 °F (36.7 °C) Resp 18 Ht 5' 7\" (1.702 m) Wt 286 lb 9.6 oz (130 kg) SpO2 94% BMI 44.89 kg/m² LVAD Pump Speed (RPM): 9400 Pump Flow (LPM): 5.1 MAP: 88 
PI (Pulsitility Index): 5.8 Power: 5.8  Test: No 
Back Up  at Bedside & Labeled: Yes Power Module Test: No Driveline Site Care: No 
Driveline Dressing: Clean, Dry, and Intact Outpatient: No 
MAP in Therapeutic Range (Outpatient): Yes Testing Alarms Reviewed: Yes 
Back up SC speed: 9400 Back up Low Speed Limit: 9200 Emergency Equipment with Patient?: Yes Emergency procedures reviewed?: No 
Drive line site inspected?: Yes Drive line intergrity inspected?: Yes Drive line dressing changed?: Yes General: Patient is morbidly obese in no acute distress, sitting up in chair HEENT: Normocephalic and atraumatic. No scleral icterus. Pupils are equal, round and reactive to light and accomodation. No conjunctival injection. Oropharynx is clear. Neck: Supple. No evidence of thyroid enlargements or lymphadenopathy. JVD: Cannot be appreciated Lungs: Breath sounds are equal and clear bilaterally. No wheezes, rhonchi, or rales. Heart: VAD hum Abdomen: Soft, obese, no mass or tenderness. No organomegaly or hernia. Bowel sounds present. Genitourinary and rectal: deferred Extremities: No cyanosis, clubbing, Redness and edema, skin breakdown RLE; BLE edema 2+, venous stasis Neurologic: No focal sensory or motor deficits are noted. Grossly intact. Psychiatric: Awake, alert and oriented x 3. Appropriate mood and affect. Skin: Warm, dry, no nodules + petechiae on extremities PAST MEDICAL HISTORY: 
Past Medical History:  
Diagnosis Date  Asthma  Cancer ([facility] breast  
 Cancer ([facility] [address]   
 endometrial  
 Congestive heart failure, unspecified  CRI (chronic renal insufficiency)  Depression  Diabetes ([facility]  Hypertension PAST SURGICAL HISTORY: 
Past Surgical History:  
Procedure Laterality Date  HX HERNIA REPAIR    
 HX HYSTERECTOMY  HX MASTECTOMY FAMILY HISTORY: 
History reviewed. No pertinent family history. SOCIAL HISTORY: 
Social History Socioeconomic History  Marital status:  Spouse name: Not on file  Number of children: Not on file  Years of education: Not on file  Highest education level: Not on file Tobacco Use  Smoking status: Never Smoker  Smokeless tobacco: Never Used Substance and Sexual Activity  Alcohol use: Not Currently LABORATORY RESULTS: 
Labs Latest Ref Rng & Units 11/7/2020 11/6/2020 11/5/2020 11/4/2020 11/3/2020 11/3/2020 11/2/2020 WBC 3.6 - 11.0 K/uL 7.4 6.4 6.6 6.8 - 6.8 7.6 RBC 3.80 - 5.20 M/uL 3.68(L) 3.82 3.66(L) 3.59(L) - 3.64(L) 3.68(L) Hemoglobin 11.5 - 16.0 g/dL 9.3(L) 9.7(L) 9.4(L) 9.2(L) - 9. 3(L) 9.4(L) Hematocrit 35.0 - 47.0 % 31. 8(L) 32. 4(L) 31. 5(L) 30. 8(L) - 31. 5(L) 31. 8(L) MCV 80.0 - 99.0 FL 86.4 84.8 86.1 85.8 - 86.5 86.4 Platelets 019 - 132 K/uL 185 183 170 173 - 163 194 Lymphocytes 12 - 49 % - - - - - - - Monocytes 5 - 13 % - - - - - - - Eosinophils 0 - 7 % - - - - - - - Basophils 0 - 1 % - - - - - - - Albumin 3.5 - 5.0 g/dL 3.2(L) 3.0(L) 3.0(L) 2. 9(L) - 2. 9(L) 3. 1(L) Calcium 8.5 - 10.1 MG/DL 9.1 9.5 9.1 9.0 - 8.9 8.8 Glucose 65 - 100 mg/dL 80 108(H) 95 123(H) - 121(H) 185(H) BUN 6 - 20 MG/DL 70(H) 64(H) 61(H) 52(H) - 47(H) 43(H) Creatinine 0.55 - 1.02 MG/DL 2.52(H) 2.23(H) 2.24(H) 2.13(H) - 2.04(H) 2.14(H) Sodium 136 - 145 mmol/L 138 137 140 138 - 138 140 Potassium 3.5 - 5.1 mmol/L 4.8 4.8 4.5 4.5 4.7 5.1 4.0 TSH 0.450 - 4.500 uIU/mL - - - - - - -  
LDH 81 - 246 U/L 298(H) 391(H) 312(H) 329(H) - 517(H) 321(H) Some recent data might be hidden ALLERGY: 
No Known Allergies CURRENT MEDICATIONS: 
 
Current Facility-Administered Medications:  
  albumin human 25% (BUMINATE) solution 12.5 g, 12.5 g, IntraVENous, ONCE, Will Maame, NP 
  cefepime (MAXIPIME) 2 g in 0.9% sodium chloride (MBP/ADV) 100 mL, 2 g, IntraVENous, Q24H, Meche Ventura DO, Last Rate: 200 mL/hr at 11/07/20 1039, 2 g at 11/07/20 1039 
  miconazole (MICOTIN) 2 % powder, , Topical, BID, CHARLENE Ventura M, DO 
  mexiletine (MEXITIL) capsule 150 mg, 150 mg, Oral, TID, Samuel, Jak Shook MD, 150 mg at 11/07/20 1719   carvediloL (COREG) tablet 12.5 mg, 12.5 mg, Oral, BID WITH MEALS, Jeison Walter NP, 12.5 mg at 11/07/20 9300   bumetanide (BUMEX) tablet 2 mg, 2 mg, Oral, DAILY, Jeison Walter NP, 2 mg at 11/06/20 1415   influenza vaccine 2020-21 (6 mos+)(PF) (FLUARIX/FLULAVAL/FLUZONE QUAD) injection 0.5 mL, 0.5 mL, IntraMUSCular, PRIOR TO DISCHARGE, Braxton, Triny B, NP 
  pneumococcal 23-valent (PNEUMOVAX 23) injection 0.5 mL, 0.5 mL, IntraMUSCular, PRIOR TO DISCHARGE, Braxton Triny B, NP 
  insulin NPH (NOVOLIN N, HUMULIN N) injection 25 Units, 25 Units, SubCUTAneous, BID, Braxton Triny B, NP, 25 Units at 11/07/20 0847 
  insulin lispro (HUMALOG) injection 6 Units, 6 Units, SubCUTAneous, TIDAC, Braxton, Triny B, NP, 6 Units at 11/07/20 0730 
  amLODIPine (NORVASC) tablet 2.5 mg, 2.5 mg, Oral, BID, Inna Walter NP, 2.5 mg at 11/07/20 6425   ammonium lactate (LAC-HYDRIN) 12 % lotion, , Topical, BID, Oly Belle, NP 
  albuterol (PROVENTIL VENTOLIN) nebulizer solution 2.5 mg, 2.5 mg, Nebulization, BID PRN, Disha Hawley MD 
  sodium chloride (NS) flush 5-40 mL, 5-40 mL, IntraVENous, Q8H, Disha Hawley MD, 10 mL at 11/07/20 1801   sodium chloride (NS) flush 5-40 mL, 5-40 mL, IntraVENous, PRN, Disha Hawley MD 
  polyethylene glycol (MIRALAX) packet 17 g, 17 g, Oral, DAILY PRN, Disha Hawley MD 
  glucose chewable tablet 16 g, 4 Tab, Oral, PRN, Disha Hawley MD 
  glucagon (GLUCAGEN) injection 1 mg, 1 mg, IntraMUSCular, PRN, Oswald Lundberg MD 
  Warfarin - Pharmacy to Dose, , Other, Rx Dosing/Monitoring, Charisse Ward MD 
  dextrose 10% infusion 0-250 mL, 0-250 mL, IntraVENous, PRN, Adolfo Philippe NP 
  insulin lispro (HUMALOG) injection, , SubCUTAneous, AC&HS, Adolfo Philippe NP, Stopped at 11/06/20 1630   aspirin delayed-release tablet 81 mg, 81 mg, Oral, DAILY, Disha Hawley MD, 81 mg at 11/07/20 0847 
  arformoterol 15 mcg/budesonide 0.5 mg neb solution, , Nebulization, BID RT, Disha Hawley MD 
  ergocalciferol capsule 50,000 Units, 50,000 Units, Oral, Q7D, Disha Hawley MD, 50,000 Units at 11/05/20 1794   ferrous sulfate tablet 325 mg, 1 Tab, Oral, DAILY WITH BREAKFAST, Disha Hawley MD, 325 mg at 11/07/20 0847 
  gabapentin (NEURONTIN) capsule 300 mg, 300 mg, Oral, DAILY, Disha Hawley MD, 300 mg at 11/07/20 3078   hydrOXYzine HCL (ATARAX) tablet 10 mg, 10 mg, Oral, TID PRN, Disha Hawley MD 
  icosapent ethyL (VASCEPA) capsule 1 Cap, 1 Cap, Oral, BID WITH MEALS, Disha Hawley MD, Stopped at 10/31/20 0800   PARoxetine (PAXIL) tablet 30 mg, 30 mg, Oral, DAILY, Disha Hawley MD, 30 mg at 11/07/20 3615   tolterodine (DETROL) tablet 8 mg, 8 mg, Oral, BID, Disha Hawley MD, Stopped at 10/31/20 0900   hydrALAZINE (APRESOLINE) 20 mg/mL injection 5 mg, 5 mg, IntraVENous, Q4H PRN, Disha Hawley MD 
  acetaminophen (TYLENOL) tablet 650 mg, 650 mg, Oral, Q4H PRN, Disha Hawley MD 
 
Thank you for your referral and allowing me to participate in this patient's care. TEA Riddle [address], [address] Phone: [phone] Fax: [phone] PATIENT CARE TEAM: 
Patient Care Team: 
Graciela Oleary NP as PCP - General (Nurse Practitioner)

## 2020-11-07 NOTE — PROGRESS NOTES
Cards fu 
Requested evaluation of dc meds including anti-arrhythmic drug(s) prior to dc Complex hx of NICM and possible amyloid On GDMT with AHFS managing Pt ready for dc Denies cp, has less sob and reduced edema Had some nausea with mexilitene yesterday but that seems resolved today Hx of mild CAD, CHF , NICM 
non-ischemic cardiomyopathy,  acute on chronic systolic CHF (congestive heart failure) ([facility] [address] [I50.23].   Has past medical history of chronic systolic CHF status post AICD, cardiogenic shock, s/p impella bridge to LVAD, post LVAD implantation (4/15/2017 by Dr. Carlene Underwood failure, CAD (left heart catheterization 8/2016 with high-grade ramus and small PDA disease borderline disease of LAD and takeoff of pRCA), A. fib, pulmonary hypertension, JED on CPAP, diabetes type 2, HLD, severe COPD, CKD, morbid obesity, hypertension, breast cancer status post bilateral mastectomy/chemo, and endometrial cancer status post hysterectomy, lymphedema left lower extremity, severe COPD.  Had previously been followed by advanced heart failure at [facility] but now followed by Dr. Zulma Espitia 
Physical Exam: Obese Patient Vitals for the past 12 hrs: 
 Temp Pulse Resp SpO2  
11/07/20 0741 98 °F (36.7 °C) 70 18 94 % 11/07/20 0331 98.1 °F (36.7 °C) 70 20 99 % General:  Alert, cooperative, well nourished, well developed, appears stated age Neck: Supple, symmetrical, trachea midline, no JVD Lungs:   Clear to auscultation bilaterally, good effort Heart:  Regular rate and rhythm, no murmur, click, rub, or gallop Abdomen:   Soft,  non-distended Extremities: 1+ edema with stasis dermatitis Skin: Skin color, texture, turgor normal.  
Neurologic: Non focal 
 
 reports that she has never smoked. She has never used smokeless tobacco. She reports previous alcohol use. Lab Results Component Value Date/Time Creatinine 2.52 (H) 11/07/2020 03:54 AM  
 
Lab Results Component Value Date/Time  HGB 9.3 (L) 11/07/2020 03:54 AM  
 
Lab Results Component Value Date/Time NT pro-BNP 1,454 (H) 11/07/2020 03:54 AM  
 NT pro-BNP 1,626 (H) 11/06/2020 05:23 AM  
 NT pro-BNP 1,432 (H) 11/05/2020 04:56 AM  
 NT pro- (H) 11/04/2020 05:49 AM  
 NT pro- (H) 11/03/2020 05:28 AM  
 
  
 
 
Recs Agree that dose of 150 mg TID at dc ok for mexilitene Already on dc plans for GDMT for CHF Has fu for CHF I will clarify fu for Dr Johana Lu on Monday Discussed with Dr Samuel Estrada Future Appointments Date Time Provider Mello Pacheco 11/11/2020 10:00 AM Teja Cornejo NP [facility] BS AMB  
11/19/2020  2:50 PM MD BARRY Galaviz BS AMB  
2/2/2021  3:20 PM PACEMAKER3DORIS BS AMB  
2/2/2021  4:00 PM MD ROBE Troy BS AMB  
2/3/2021  2:50 PM Ayesha Enrique MD RDE JEFFRY [facility] BS AMB

## 2020-11-07 NOTE — PROGRESS NOTES
1930: Bedside and Verbal shift change report given to Vinicius (oncoming nurse) by Sage Bonilla (offgoing nurse). Report included the following information SBAR, Kardex, ED Summary, OR Summary, Procedure Summary, Intake/Output, MAR, Recent Results, Cardiac Rhythm Paced, and Alarm Parameters . 0730: Bedside and Verbal shift change report given to Garry Padilla (oncoming nurse) by Vinicius (offgoing nurse). Report included the following information SBAR, Kardex, ED Summary, OR Summary, Procedure Summary, Intake/Output, MAR, Recent Results, Cardiac Rhythm PACED W/ PVCs and Alarm Parameters . Problem: Pressure Injury - Risk of 
Goal: *Prevention of pressure injury Description: Document Jaime Scale and appropriate interventions in the flowsheet. Outcome: Progressing Towards Goal 
Note: Pressure Injury Interventions: 
Sensory Interventions: Check visual cues for pain, Discuss PT/OT consult with provider, Keep linens dry and wrinkle-free Moisture Interventions: Absorbent underpads, Internal/External urinary devices Activity Interventions: Increase time out of bed, Pressure redistribution bed/mattress(bed type), PT/OT evaluation Mobility Interventions: Pressure redistribution bed/mattress (bed type), PT/OT evaluation Nutrition Interventions: Document food/fluid/supplement intake, Offer support with meals,snacks and hydration Friction and Shear Interventions: HOB 30 degrees or less, Minimize layers, Foam dressings/transparent film/skin sealants Problem: Heart Failure: Day 5 Goal: Activity/Safety Outcome: Progressing Towards Goal 
Goal: Diagnostic Test/Procedures Outcome: Progressing Towards Goal 
Goal: Nutrition/Diet Outcome: Progressing Towards Goal 
Goal: Discharge Planning Outcome: Progressing Towards Goal 
Goal: Medications Outcome: Progressing Towards Goal 
Goal: Respiratory Outcome: Progressing Towards Goal 
Goal: Treatments/Interventions/Procedures Outcome: Progressing Towards Goal 
Goal: Psychosocial 
Outcome: Progressing Towards Goal

## 2020-11-07 NOTE — PROGRESS NOTES
0730 Bedside shift change report given to [facility] (oncoming nurse) by Kathryn Weiner RN (offgoing nurse). Report included the following information SBAR, Kardex, Intake/Output, MAR and Recent Results. 1300 Discharge medications reviewed with patient and appropriate educational materials and side effects teaching were provided. I have reviewed discharge instructions with the patient. The patient verbalized understanding. Problem: Diabetes Self-Management Goal: *Disease process and treatment process Description: Define diabetes and identify own type of diabetes; list 3 options for treating diabetes. Outcome: Resolved/Met Goal: *Incorporating nutritional management into lifestyle Description: Describe effect of type, amount and timing of food on blood glucose; list 3 methods for planning meals. Outcome: Resolved/Met Goal: *Incorporating physical activity into lifestyle Description: State effect of exercise on blood glucose levels. Outcome: Resolved/Met Goal: *Developing strategies to promote health/change behavior Description: Define the ABC's of diabetes; identify appropriate screenings, schedule and personal plan for screenings. Outcome: Resolved/Met Goal: *Using medications safely Description: State effect of diabetes medications on diabetes; name diabetes medication taking, action and side effects. Outcome: Resolved/Met Goal: *Monitoring blood glucose, interpreting and using results Description: Identify recommended blood glucose targets  and personal targets. Outcome: Resolved/Met Goal: *Prevention, detection, treatment of acute complications Description: List symptoms of hyper- and hypoglycemia; describe how to treat low blood sugar and actions for lowering  high blood glucose level. Outcome: Resolved/Met Goal: *Prevention, detection and treatment of chronic complications Description: Define the natural course of diabetes and describe the relationship of blood glucose levels to long term complications of diabetes. Outcome: Resolved/Met Goal: *Developing strategies to address psychosocial issues Description: Describe feelings about living with diabetes; identify support needed and support network Outcome: Resolved/Met Goal: *Insulin pump training Outcome: Resolved/Met Goal: *Sick day guidelines Outcome: Resolved/Met Goal: *Patient Specific Goal (EDIT GOAL, INSERT TEXT) Outcome: Resolved/Met Problem: Patient Education: Go to Patient Education Activity Goal: Patient/Family Education Outcome: Resolved/Met Problem: Pressure Injury - Risk of 
Goal: *Prevention of pressure injury Description: Document Jaime Scale and appropriate interventions in the flowsheet. Outcome: Resolved/Met Note: Pressure Injury Interventions: 
Sensory Interventions: Check visual cues for pain, Discuss PT/OT consult with provider, Keep linens dry and wrinkle-free Moisture Interventions: Absorbent underpads, Internal/External urinary devices Activity Interventions: Increase time out of bed, Pressure redistribution bed/mattress(bed type), PT/OT evaluation Mobility Interventions: Pressure redistribution bed/mattress (bed type), PT/OT evaluation Nutrition Interventions: Document food/fluid/supplement intake, Offer support with meals,snacks and hydration Friction and Shear Interventions: HOB 30 degrees or less, Minimize layers, Foam dressings/transparent film/skin sealants Problem: Patient Education: Go to Patient Education Activity Goal: Patient/Family Education Outcome: Resolved/Met Problem: Discharge Planning Goal: *Discharge to safe environment Outcome: Resolved/Met Problem: Patient Education: Go to Patient Education Activity Goal: Patient/Family Education Outcome: Resolved/Met Problem: Heart Failure: Day 1 Goal: Off Pathway (Use only if patient is Off Pathway) Outcome: Resolved/Met Goal: Activity/Safety Outcome: Resolved/Met Goal: Consults, if ordered Outcome: Resolved/Met Goal: Diagnostic Test/Procedures Outcome: Resolved/Met Goal: Nutrition/Diet Outcome: Resolved/Met Goal: Discharge Planning Outcome: Resolved/Met Goal: Medications Outcome: Resolved/Met Goal: Respiratory Outcome: Resolved/Met Goal: Treatments/Interventions/Procedures Outcome: Resolved/Met Goal: Psychosocial 
Outcome: Resolved/Met Goal: *Oxygen saturation within defined limits Outcome: Resolved/Met Goal: *Hemodynamically stable Outcome: Resolved/Met Goal: *Optimal pain control at patient's stated goal 
Outcome: Resolved/Met Goal: *Anxiety reduced or absent Outcome: Resolved/Met Problem: Heart Failure: Day 2 Goal: Off Pathway (Use only if patient is Off Pathway) Outcome: Resolved/Met Goal: Activity/Safety Outcome: Resolved/Met Goal: Consults, if ordered Outcome: Resolved/Met Goal: Diagnostic Test/Procedures Outcome: Resolved/Met Goal: Nutrition/Diet Outcome: Resolved/Met Goal: Discharge Planning Outcome: Resolved/Met Goal: Medications Outcome: Resolved/Met Goal: Respiratory Outcome: Resolved/Met Goal: Treatments/Interventions/Procedures Outcome: Resolved/Met Goal: Psychosocial 
Outcome: Resolved/Met Goal: *Oxygen saturation within defined limits Outcome: Resolved/Met Goal: *Hemodynamically stable Outcome: Resolved/Met Goal: *Optimal pain control at patient's stated goal 
Outcome: Resolved/Met Goal: *Anxiety reduced or absent Outcome: Resolved/Met Goal: *Demonstrates progressive activity Outcome: Resolved/Met Problem: Heart Failure: Day 3 Goal: Off Pathway (Use only if patient is Off Pathway) Outcome: Resolved/Met Goal: Activity/Safety Outcome: Resolved/Met Goal: Diagnostic Test/Procedures Outcome: Resolved/Met Goal: Nutrition/Diet Outcome: Resolved/Met Goal: Discharge Planning Outcome: Resolved/Met Goal: Medications Outcome: Resolved/Met Goal: Respiratory Outcome: Resolved/Met Goal: Treatments/Interventions/Procedures Outcome: Resolved/Met Goal: Psychosocial 
Outcome: Resolved/Met Goal: *Oxygen saturation within defined limits Outcome: Resolved/Met Goal: *Hemodynamically stable Outcome: Resolved/Met Goal: *Optimal pain control at patient's stated goal 
Outcome: Resolved/Met Goal: *Anxiety reduced or absent Outcome: Resolved/Met Goal: *Demonstrates progressive activity Outcome: Resolved/Met Problem: Heart Failure: Day 4 Goal: Off Pathway (Use only if patient is Off Pathway) Outcome: Resolved/Met Goal: Activity/Safety Outcome: Resolved/Met Goal: Diagnostic Test/Procedures Outcome: Resolved/Met Goal: Nutrition/Diet Outcome: Resolved/Met Goal: Discharge Planning Outcome: Resolved/Met Goal: Medications Outcome: Resolved/Met Goal: Respiratory Outcome: Resolved/Met Goal: Treatments/Interventions/Procedures Outcome: Resolved/Met Goal: Psychosocial 
Outcome: Resolved/Met Goal: *Oxygen saturation within defined limits Outcome: Resolved/Met Goal: *Hemodynamically stable Outcome: Resolved/Met Goal: *Optimal pain control at patient's stated goal 
Outcome: Resolved/Met Goal: *Anxiety reduced or absent Outcome: Resolved/Met Goal: *Demonstrates progressive activity Outcome: Resolved/Met Problem: Heart Failure: Day 5 Goal: Off Pathway (Use only if patient is Off Pathway) Outcome: Resolved/Met Goal: Activity/Safety Outcome: Resolved/Met Goal: Diagnostic Test/Procedures Outcome: Resolved/Met Goal: Nutrition/Diet Outcome: Resolved/Met Goal: Discharge Planning Outcome: Resolved/Met Goal: Medications Outcome: Resolved/Met Goal: Respiratory Outcome: Resolved/Met Goal: Treatments/Interventions/Procedures Outcome: Resolved/Met Goal: Psychosocial 
Outcome: Resolved/Met Problem: Heart Failure: Discharge Outcomes Goal: *Demonstrates ability to perform prescribed activity without shortness of breath or discomfort Outcome: Resolved/Met Goal: *Left ventricular function assessment completed prior to or during stay, or planned for post-discharge Outcome: Resolved/Met Goal: *ACEI prescribed if LVEF less than 40% and no contraindications or ARB prescribed Outcome: Resolved/Met Goal: *Verbalizes understanding and describes prescribed diet Outcome: Resolved/Met Goal: *Verbalizes understanding/describes prescribed medications Outcome: Resolved/Met Goal: *Describes available resources and support systems Description: (eg: Home Health, Palliative Care, Advanced Medical Directive) Outcome: Resolved/Met Goal: *Describes smoking cessation resources Outcome: Resolved/Met Goal: *Understands and describes signs and symptoms to report to providers(Stroke Metric) Outcome: Resolved/Met Goal: *Describes/verbalizes understanding of follow-up/return appt Description: (eg: to physicians, diabetes treatment coordinator, and other resources Outcome: Resolved/Met Goal: *Describes importance of continuing daily weights and changes to report to physician Outcome: Resolved/Met

## 2020-11-07 NOTE — DISCHARGE INSTRUCTIONS
Future Appointments   Date Time Provider Mello Pacheco   2/2/2021  3:20 PM Edie ELLISON   2/2/2021  4:00 PM MD Evelyn Jimenez M.D. [facility]  Electrophysiology/Cardiology  [facility]  [address] [place] [address]                              [phone]                                             Discharge Instructions       PATIENT ID: Sandy Son  MRN: [id number]   YOB: 1950    DATE OF ADMISSION: 10/27/2020  6:09 PM    DATE OF DISCHARGE: 11/7/2020    PRIMARY CARE PROVIDER: Suni Perkins NP     ATTENDING PHYSICIAN: Alyssa Johnson DO  DISCHARGING PROVIDER: Robyn Garland DO    To contact this individual call [phone] and ask the  to page. If unavailable ask to be transferred the Adult Hospitalist Department. DISCHARGE DIAGNOSES     Heart failure exacerbation  Sternal wound    CONSULTATIONS: IP CONSULT TO INFECTIOUS DISEASES  IP CONSULT TO ELECTROPHYSIOLOGY  IP CONSULT TO CARDIAC SURGERY  IP CONSULT TO CARDIOLOGY    PROCEDURES/SURGERIES: Procedure(s):  RIGHT HEART CATH    PENDING TEST RESULTS:   At the time of discharge the following test results are still pending: none    FOLLOW UP APPOINTMENTS:   Follow-up Information     Follow up With Specialties Details Why Contact Info    Suni Perkins NP Nurse Practitioner Schedule an appointment as soon as possible for a visit in 1 week Please call to schedule a FUV for ongoing diabetes management. Please bring a copy of your glucose numbers or bring glucometer for review. [address] [place] [phone]      [employer] Beatriz Villasenor 1721 Cardiology On 11/11/2020 10:00 am with Gonzalo Thurston NP [address] [place] [address]  [phone]           ADDITIONAL CARE RECOMMENDATIONS:     New medications:   1.  Take Levaquin 750 mg on 11/8/2020 and then 11/10/2020. Then start Keflex twice daily on 11/11/2020. You will need to be on Keflex long term. Check your INR Monday morning and then notify heart failure clinic with results. DISCHARGE MEDICATIONS:   See Medication Reconciliation Form    · It is important that you take the medication exactly as they are prescribed. · Keep your medication in the bottles provided by the pharmacist and keep a list of the medication names, dosages, and times to be taken in your wallet. · Do not take other medications without consulting your doctor. NOTIFY YOUR PHYSICIAN FOR ANY OF THE FOLLOWING:   Fever over 101 degrees for 24 hours. Chest pain, shortness of breath, fever, chills, nausea, vomiting, diarrhea, change in mentation, falling, weakness, bleeding. Severe pain or pain not relieved by medications. Or, any other signs or symptoms that you may have questions about.         Signed:   [address] [facility], DO  11/7/2020  9:48 AM

## 2020-11-07 NOTE — DISCHARGE SUMMARY
Discharge Summary       PATIENT ID: Marina Figueroa  MRN: [id number]   YOB: 1950    DATE OF ADMISSION: 10/27/2020  6:09 PM    DATE OF DISCHARGE: 11/7/2020  PRIMARY CARE PROVIDER: Myna Schaumann, NP     ATTENDING PHYSICIAN: Shakira Bradley DO   DIS[facility]GING PROVIDER: [facility]    To contact this individual call [phone] and ask the  to page. If unavailable ask to be transferred the Adult Hospitalist Department. CONSULTATIONS: IP CONSULT TO INFECTIOUS DISEASES  IP CONSULT TO ELECTROPHYSIOLOGY  IP CONSULT TO CARDIOLOGY    PROCEDURES/SURGERIES: Procedure(s):  RIGHT HEART CATH    ADMITTING DIAGNOSES & HOSPITAL COURSE:   78year old female with a significant past medical history of chronic systolic heart failure due to ischemic cardiomyopathy with implanted left ventricular assist device presenting to [facility] on 10/27/2020 for lower extremity swelling and redness. Found to have acute on chronic systolic heart failure. Further details below. CT Scan:   IMPRESSION:  1. In the midline inferior to the sternum and expected location of the manubrium  and apparently socially with skin defect is a subcutaneous oval-shaped density  measuring between 35 and 54 Hounsfield units. This has well-defined margins  without adjacent stranding. This could represent high density infected fluid. This could represent granulation tissue this could represent a combination of  both. This does extend down to the level of the left ventricular assist device  outflow tract anteriorly. There is no associated gas collection. Slightly more  superiorly and along the anterior aspect of the outflow tract cannula is a small  triangular area of soft tissue density measuring 54 Hounsfield units that could  represent granulation tissue or inflammatory reaction. This is not associated  with fluid density or air density. . Please see above text    DISCHARGE DIAGNOSES / PLAN:      Acute-on-chronic systolic congestive heart failure, NYHA class IV  Ischemic cardiomyopathy- s/p LVAD  -Transitioned to oral bumex 2 mg daily. Continue Carvedilol  -S/p RHC 11/3, reduced dose of coreg and entresto, increased speed of VAD- patient felt dizzy and had frequent PVCs, now speed back to previous level.     Recurrent Vtach, 11/5:  -Dr. Lukasz Salomon with EP consulted. Initiated on mexiletine.      Suspect sternal wound infection:  Cellulitis of RLE: background of venous stasis and small wound  - sternum cultures on 10/31: spencer rosario  - ID following, cefepime while inpatient. Discharge on [address] then transition to long term keflex for suppressive therapy     HTN: chronic, stable, Home regimen, PRN BP meds. Monitor  Chronic normocytic anemia. Hemoglobin stable between 89, iron/B12/folate wnl  Type 2 diabetes with hyperglycemia: home medications on discharge  COPD: stable-Continue maintenance inhaled steroids. Bronchodilators as needed. CKD3: avoid nephrotoxics. F/u with nephro op  Depression, stable. Continue  Paxil  Morbid obesity: Body mass index is 46.34 kg/m². Counseled on weight loss   L Labial hematoma: 2cm ovale bruise, likely hematoma from purewick use. Monitor, f/u op with gynecology if not resolving. ADDITIONAL CARE RECOMMENDATIONS:   New medications:   1. Take Levaquin 750 mg on 11/8/2020 and then 11/10/2020. Then start Keflex twice daily on 11/11/2020. You will need to be on Keflex long term. Check your INR Monday morning and then notify heart failure clinic with results. PENDING TEST RESULTS:   At the time of discharge the following test results are still pending: none    FOLLOW UP APPOINTMENTS:    Follow-up Information     Follow up With Specialties Details Why Contact Alexis Zhao NP Nurse Practitioner Schedule an appointment as soon as possible for a visit in 1 week Please call to schedule a FUV for ongoing diabetes management.   Please bring a copy of your glucose numbers or bring glucometer for review. [address] [address] Lizett Villasenor 1721 Cardiology On 11/11/2020 10:00 am with Lina Steele,  [place] [id number]  [address] Services   [address] [place] [address] Chester Cuellar. [address] [place] [address]  [phone]             DISCHARGE MEDICATIONS:  Discharge Medication List as of 11/7/2020 12:27 PM      START taking these medications    Details   levoFLOXacin (Levaquin) 750 mg tablet Take 1 Tab by mouth every other day for 4 days. , Print, Disp-2 Tab,R-0      cephALEXin (Keflex) 500 mg capsule Take 1 Cap by mouth two (2) times a day., Print, Disp-60 Cap,R-0      mexiletine (MEXITIL) 150 mg capsule Take 1 Cap by mouth three (3) times daily. Indications: ventricular arrhythmias, a type of abnormal heart rhythm, Print, Disp-90 Cap,R-0      bumetanide (BUMEX) 2 mg tablet Take 1 Tab by mouth daily. , Normal, Disp-30 Tab,R-2      magnesium oxide (MAG-OX) 400 mg tablet Take 1 Tab by mouth two (2) times a day., Normal, Disp-60 Tab,R-2         CONTINUE these medications which have CHANGED    Details   ferrous sulfate (Iron) 325 mg (65 mg iron) tablet Take 1 Tab by mouth daily (with breakfast). , Print, Disp-30 Tab,R-0      carvediloL (COREG) 25 mg tablet Take 0.5 Tabs by mouth two (2) times daily (with meals). , Normal, Disp-30 Tab,R-2         CONTINUE these medications which have NOT CHANGED    Details   ergocalciferol (ERGOCALCIFEROL) 1,250 mcg (50,000 unit) capsule Take 1 Cap by mouth every seven (7) days. , Normal, Disp-14 Cap,R-0      icosapent ethyL (VASCEPA) 1 gram capsule Take 1 Cap by mouth two (2) times daily (with meals). , Normal, Disp-60 Cap,R-1      amLODIPine (NORVASC) 5 mg tablet Take 5 mg by mouth daily. , Historical Med      tolterodine (DETROL) 2 mg tablet Take 8 mg by mouth two (2) times a day., Historical Med      albuterol sulfate (PROVENTIL IN) Take 1 Puff by inhalation four (4) times daily as needed., Historical Med      budesonide-formoteroL (Symbicort) 160-4.5 mcg/actuation HFAA Take 2 Puffs by inhalation ACB/HS. , Historical Med      hydrOXYzine HCL (ATARAX) 10 mg tablet Take 10 mg by mouth three (3) times daily as needed for Itching (hives). , Historical Med      warfarin (COUMADIN) 1 mg tablet Take 4 mg by mouth daily. , Historical Med      PARoxetine (PAXIL) 30 mg tablet Take 30 mg by mouth daily. , Historical Med      gabapentin (NEURONTIN) 300 mg capsule Take 1 Cap by mouth daily. Max Daily Amount: 300 mg., Normal, Disp-30 Cap,R-2      insulin detemir U-100 (LEVEMIR FLEXTOUCH) 100 unit/mL (3 mL) inpn 40 units at bedtime, Normal, Disp-1 Adjustable Dose Pre-filled Pen Syringe,R-2      aspirin 81 mg tablet Take 81 mg by mouth daily. , Historical Med         STOP taking these medications       allopurinoL (ZYLOPRIM) 100 mg tablet Comments:   Reason for Stopping:         ENTRESTO 97 MG-103 MG TABLET Comments:   Reason for Stopping:                 NOTIFY YOUR PHYSICIAN FOR ANY OF THE FOLLOWING:   Fever over 101 degrees for 24 hours. Chest pain, shortness of breath, fever, chills, nausea, vomiting, diarrhea, change in mentation, falling, weakness, bleeding. Severe pain or pain not relieved by medications. Or, any other signs or symptoms that you may have questions about. DISPOSITION:  x  Home With:   OT  PT  HH  RN       Long term SNF/Inpatient Rehab    Independent/assisted living    Hospice    Other:       PATIENT CONDITION AT DISCHARGE:     Functional status    Poor     Deconditioned    x Independent      Cognition   x  Lucid     Forgetful     Dementia      Catheters/lines (plus indication)    Fletcher     PICC     PEG    x LVAD      Code status    x Full code     DNR      PHYSICAL EXAMINATION AT DISCHARGE:  Constitutional:  sitting in chair, on room air.  Morbidly obese               Cardic: irregular, LVAD hum    Lung:        Clear auscultation bilaterally, no wheeze, no increased work of breathing   Sternal  Small wound site without erythema    GI:  Obese. LVAD exit to the epigastric area. Soft, non distended, non tender          Musculoskeletal:  signs of chronic venous insufficiency.  No edema     Neurologic:  Mental status:AAOx3,   Motor exam:Moves all extremities symmetrically           CHRONIC MEDICAL DIAGNOSES:  Problem List as of 11/7/2020 Date Reviewed: 10/28/2020          Codes Class Noted - Resolved    NSVT (nonsustained ventricular tachycardia) ([facility]) ICD-10-CM: I47.2  ICD-9-CM: 427.1  11/6/2020 - Present        NICM (nonischemic cardiomyopathy) ([facility] ICD-10-CM: I42.8  ICD-9-CM: 425.4  11/4/2020 - Present        Coronary artery disease involving native coronary artery of native heart without angina pectoris ICD-10-CM: I25.10  ICD-9-CM: 414.01  11/4/2020 - Present        JED on CPAP ICD-10-CM: G47.33, Z99.89  ICD-9-CM: 327.23, V46.8  11/4/2020 - Present        Chronic venous insufficiency ICD-10-CM: I87.2  ICD-9-CM: 459.81  11/4/2020 - Present        Ulcer of right foot, limited to breakdown of skin ([facility] ICD-10-CM: U84.506  ICD-9-CM: 707.15  11/4/2020 - Present        * (Principal) Acute on chronic systolic CHF (congestive heart failure) ([facility]) ICD-10-CM: I50.23  ICD-9-CM: 428.23, 428.0  10/27/2020 - Present        Obesity, morbid ([facility] ICD-10-CM: E66.01  ICD-9-CM: 278.01  10/1/2020 - Present              Greater than 30 minutes were spent with the patient on counseling and coordination of care    Signed:   Maria Ines Treviño DO  11/7/2020  6:48 PM

## 2020-11-07 NOTE — PROGRESS NOTES
Cm contacted [facility] ([phone]) and spoke to Blanca Idalia and she stated they can begin services on 11.8.2020. No other needs identified at this time. Medicare pt has received, reviewed, and signed 2nd IM letter informing them of their right to appeal the discharge. Signed copy has been placed on pt bedside chart.

## 2020-11-07 NOTE — PROGRESS NOTES
Faculty or Preceptor Review of Student Work 
 
11/7/2020  - Shift times - 1930 to 0800 The student documentation of patient care for Nataly Murphy has been reviewed and approved. All medications have been administered under the direct supervision of the faculty or preceptor.  
 
Lexa Medina RN

## 2020-11-07 NOTE — PROGRESS NOTES
Problem: Diabetes Self-Management Goal: *Disease process and treatment process Description: Define diabetes and identify own type of diabetes; list 3 options for treating diabetes. Outcome: Resolved/Met Goal: *Incorporating nutritional management into lifestyle Description: Describe effect of type, amount and timing of food on blood glucose; list 3 methods for planning meals. Outcome: Resolved/Met Goal: *Incorporating physical activity into lifestyle Description: State effect of exercise on blood glucose levels. Outcome: Resolved/Met Goal: *Developing strategies to promote health/change behavior Description: Define the ABC's of diabetes; identify appropriate screenings, schedule and personal plan for screenings. Outcome: Resolved/Met Goal: *Using medications safely Description: State effect of diabetes medications on diabetes; name diabetes medication taking, action and side effects. Outcome: Resolved/Met Goal: *Monitoring blood glucose, interpreting and using results Description: Identify recommended blood glucose targets  and personal targets. Outcome: Resolved/Met Goal: *Prevention, detection, treatment of acute complications Description: List symptoms of hyper- and hypoglycemia; describe how to treat low blood sugar and actions for lowering  high blood glucose level. Outcome: Resolved/Met Goal: *Prevention, detection and treatment of chronic complications Description: Define the natural course of diabetes and describe the relationship of blood glucose levels to long term complications of diabetes. Outcome: Resolved/Met Goal: *Developing strategies to address psychosocial issues Description: Describe feelings about living with diabetes; identify support needed and support network Outcome: Resolved/Met Goal: *Insulin pump training Outcome: Resolved/Met Goal: *Sick day guidelines Outcome: Resolved/Met Goal: *Patient Specific Goal (EDIT GOAL, INSERT TEXT) Outcome: Resolved/Met Problem: Patient Education: Go to Patient Education Activity Goal: Patient/Family Education Outcome: Resolved/Met Problem: Pressure Injury - Risk of 
Goal: *Prevention of pressure injury Description: Document Jaime Scale and appropriate interventions in the flowsheet. Outcome: Resolved/Met Note: Pressure Injury Interventions: 
Sensory Interventions: Check visual cues for pain, Discuss PT/OT consult with provider, Keep linens dry and wrinkle-free Moisture Interventions: Absorbent underpads, Internal/External urinary devices Activity Interventions: Increase time out of bed, Pressure redistribution bed/mattress(bed type), PT/OT evaluation Mobility Interventions: Pressure redistribution bed/mattress (bed type), PT/OT evaluation Nutrition Interventions: Document food/fluid/supplement intake, Offer support with meals,snacks and hydration Friction and Shear Interventions: HOB 30 degrees or less, Minimize layers, Foam dressings/transparent film/skin sealants Problem: Patient Education: Go to Patient Education Activity Goal: Patient/Family Education Outcome: Resolved/Met Problem: Discharge Planning Goal: *Discharge to safe environment Outcome: Resolved/Met Problem: Patient Education: Go to Patient Education Activity Goal: Patient/Family Education Outcome: Resolved/Met Problem: Heart Failure: Day 1 Goal: Off Pathway (Use only if patient is Off Pathway) Outcome: Resolved/Met Goal: Activity/Safety Outcome: Resolved/Met Goal: Consults, if ordered Outcome: Resolved/Met Goal: Diagnostic Test/Procedures Outcome: Resolved/Met Goal: Nutrition/Diet Outcome: Resolved/Met Goal: Discharge Planning Outcome: Resolved/Met Goal: Medications Outcome: Resolved/Met Goal: Respiratory Outcome: Resolved/Met Goal: Treatments/Interventions/Procedures Outcome: Resolved/Met Goal: Psychosocial 
Outcome: Resolved/Met Goal: *Oxygen saturation within defined limits Outcome: Resolved/Met Goal: *Hemodynamically stable Outcome: Resolved/Met Goal: *Optimal pain control at patient's stated goal 
Outcome: Resolved/Met Goal: *Anxiety reduced or absent Outcome: Resolved/Met Problem: Heart Failure: Day 2 Goal: Off Pathway (Use only if patient is Off Pathway) Outcome: Resolved/Met Goal: Activity/Safety Outcome: Resolved/Met Goal: Consults, if ordered Outcome: Resolved/Met Goal: Diagnostic Test/Procedures Outcome: Resolved/Met Goal: Nutrition/Diet Outcome: Resolved/Met Goal: Discharge Planning Outcome: Resolved/Met Goal: Medications Outcome: Resolved/Met Goal: Respiratory Outcome: Resolved/Met Goal: Treatments/Interventions/Procedures Outcome: Resolved/Met Goal: Psychosocial 
Outcome: Resolved/Met Goal: *Oxygen saturation within defined limits Outcome: Resolved/Met Goal: *Hemodynamically stable Outcome: Resolved/Met Goal: *Optimal pain control at patient's stated goal 
Outcome: Resolved/Met Goal: *Anxiety reduced or absent Outcome: Resolved/Met Goal: *Demonstrates progressive activity Outcome: Resolved/Met Problem: Heart Failure: Day 3 Goal: Off Pathway (Use only if patient is Off Pathway) Outcome: Resolved/Met Goal: Activity/Safety Outcome: Resolved/Met Goal: Diagnostic Test/Procedures Outcome: Resolved/Met Goal: Nutrition/Diet Outcome: Resolved/Met Goal: Discharge Planning Outcome: Resolved/Met Goal: Medications Outcome: Resolved/Met Goal: Respiratory Outcome: Resolved/Met Goal: Treatments/Interventions/Procedures Outcome: Resolved/Met Goal: Psychosocial 
Outcome: Resolved/Met Goal: *Oxygen saturation within defined limits Outcome: Resolved/Met Goal: *Hemodynamically stable Outcome: Resolved/Met Goal: *Optimal pain control at patient's stated goal 
Outcome: Resolved/Met Goal: *Anxiety reduced or absent Outcome: Resolved/Met Goal: *Demonstrates progressive activity Outcome: Resolved/Met Problem: Heart Failure: Day 4 Goal: Off Pathway (Use only if patient is Off Pathway) Outcome: Resolved/Met Goal: Activity/Safety Outcome: Resolved/Met Goal: Diagnostic Test/Procedures Outcome: Resolved/Met Goal: Nutrition/Diet Outcome: Resolved/Met Goal: Discharge Planning Outcome: Resolved/Met Goal: Medications Outcome: Resolved/Met Goal: Respiratory Outcome: Resolved/Met Goal: Treatments/Interventions/Procedures Outcome: Resolved/Met Goal: Psychosocial 
Outcome: Resolved/Met Goal: *Oxygen saturation within defined limits Outcome: Resolved/Met Goal: *Hemodynamically stable Outcome: Resolved/Met Goal: *Optimal pain control at patient's stated goal 
Outcome: Resolved/Met Goal: *Anxiety reduced or absent Outcome: Resolved/Met Goal: *Demonstrates progressive activity Outcome: Resolved/Met Problem: Heart Failure: Day 5 Goal: Off Pathway (Use only if patient is Off Pathway) Outcome: Resolved/Met Goal: Activity/Safety Outcome: Resolved/Met Goal: Diagnostic Test/Procedures Outcome: Resolved/Met Goal: Nutrition/Diet Outcome: Resolved/Met Goal: Discharge Planning Outcome: Resolved/Met Goal: Medications Outcome: Resolved/Met Goal: Respiratory Outcome: Resolved/Met Goal: Treatments/Interventions/Procedures Outcome: Resolved/Met Goal: Psychosocial 
Outcome: Resolved/Met Problem: Heart Failure: Discharge Outcomes Goal: *Demonstrates ability to perform prescribed activity without shortness of breath or discomfort Outcome: Resolved/Met Goal: *Left ventricular function assessment completed prior to or during stay, or planned for post-discharge Outcome: Resolved/Met Goal: *ACEI prescribed if LVEF less than 40% and no contraindications or ARB prescribed Outcome: Resolved/Met Goal: *Verbalizes understanding and describes prescribed diet Outcome: Resolved/Met Goal: *Verbalizes understanding/describes prescribed medications Outcome: Resolved/Met Goal: *Describes available resources and support systems Description: (eg: Home Health, Palliative Care, Advanced Medical Directive) Outcome: Resolved/Met Goal: *Describes smoking cessation resources Outcome: Resolved/Met Goal: *Understands and describes signs and symptoms to report to providers(Stroke Metric) Outcome: Resolved/Met Goal: *Describes/verbalizes understanding of follow-up/return appt Description: (eg: to physicians, diabetes treatment coordinator, and other resources Outcome: Resolved/Met Goal: *Describes importance of continuing daily weights and changes to report to physician Outcome: Resolved/Met Problem: Falls - Risk of 
Goal: *Absence of Falls Description: Document Jovanni Apo Fall Risk and appropriate interventions in the flowsheet. Outcome: Resolved/Met Note: Fall Risk Interventions: 
Mobility Interventions: Communicate number of staff needed for ambulation/transfer, Patient to call before getting OOB, Utilize walker, cane, or other assistive device, Utilize gait belt for transfers/ambulation Medication Interventions: Evaluate medications/consider consulting pharmacy, Patient to call before getting OOB, Teach patient to arise slowly Elimination Interventions: Call light in reach, Patient to call for help with toileting needs, Toilet paper/wipes in reach Problem: Patient Education: Go to Patient Education Activity Goal: Patient/Family Education Outcome: Resolved/Met Problem: Breathing Pattern - Ineffective Goal: *Absence of hypoxia Outcome: Resolved/Met Goal: *Use of effective breathing techniques Outcome: Resolved/Met Goal: *PALLIATIVE CARE:  Alleviation of Dyspnea Outcome: Resolved/Met Problem: Patient Education: Go to Patient Education Activity Goal: Patient/Family Education Outcome: Resolved/Met Problem: Breathing Pattern - Ineffective Goal: *Absence of hypoxia Outcome: Resolved/Met Goal: *Use of effective breathing techniques Outcome: Resolved/Met Goal: *PALLIATIVE CARE:  Alleviation of Dyspnea Outcome: Resolved/Met Problem: Patient Education: Go to Patient Education Activity Goal: Patient/Family Education Outcome: Resolved/Met Problem: Discharge Planning Goal: *Discharge to safe environment Outcome: Resolved/Met

## 2020-11-09 PROBLEM — N17.9 AKI (ACUTE KIDNEY INJURY) (HCC): Status: ACTIVE | Noted: 2020-01-01

## 2020-11-09 PROBLEM — D64.9 ANEMIA: Status: ACTIVE | Noted: 2020-01-01

## 2020-11-09 NOTE — ED TRIAGE NOTES
Pt arrives to triage with dressing to left and right lower legs with left leg bleeding thru dressing bulky pressure dressing applied, family also stated she has been lethargic , +LVAD pt

## 2020-11-09 NOTE — DISCHARGE INSTRUCTIONS
Patient Education        Scrapes (Abrasions): Care Instructions  Your Care Instructions  Scrapes (abrasions) are wounds where your skin has been rubbed or torn off. Most scrapes do not go deep into the skin, but some may remove several layers of skin. Scrapes usually don't bleed much, but they may ooze pinkish fluid. Scrapes on the head or face may appear worse than they are. They may bleed a lot because of the good blood supply to this area. Most scrapes heal well and may not need a bandage. They usually heal within 3 to 7 days. A large, deep scrape may take 1 to 2 weeks or longer to heal. A scab may form on some scrapes. Follow-up care is a key part of your treatment and safety. Be sure to make and go to all appointments, and call your doctor if you are having problems. It's also a good idea to know your test results and keep a list of the medicines you take. How can you care for yourself at home? · If your doctor told you how to care for your wound, follow your doctor's instructions. If you did not get instructions, follow this general advice:  ? Wash the scrape with clean water 2 times a day. Don't use hydrogen peroxide or alcohol, which can slow healing. ? You may cover the scrape with a thin layer of petroleum jelly, such as Vaseline, and a nonstick bandage. ? Apply more petroleum jelly and replace the bandage as needed. · Prop up the injured area on a pillow anytime you sit or lie down during the next 3 days. Try to keep it above the level of your heart. This will help reduce swelling. · Be safe with medicines. Take pain medicines exactly as directed. ? If the doctor gave you a prescription medicine for pain, take it as prescribed. ? If you are not taking a prescription pain medicine, ask your doctor if you can take an over-the-counter medicine. When should you call for help?    Call your doctor now or seek immediate medical care if:    · You have signs of infection, such as:  ? Increased pain, swelling, warmth, or redness around the scrape. ? Red streaks leading from the scrape. ? Pus draining from the scrape. ? A fever.     · The scrape starts to bleed, and blood soaks through the bandage. Oozing small amounts of blood is normal.   Watch closely for changes in your health, and be sure to contact your doctor if the scrape is not getting better each day. Where can you learn more? Go to http://www.gray.com/  Enter A374 in the search box to learn more about \"Scrapes (Abrasions): Care Instructions. \"  Current as of: June 26, 2019               Content Version: 12.6  © 8876-3026 [employer]. Care instructions adapted under license by [employer] (which disclaims liability or warranty for this information). If you have questions about a medical condition or this instruction, always ask your healthcare professional. [place] [address] any warranty or liability for your use of this information. Patient Education        Preventing Falls: Care Instructions  Your Care Instructions    Getting around your home safely can be a challenge if you have injuries or health problems that make it easy for you to fall. Loose rugs and furniture in walkways are among the dangers for many older people who have problems walking or who have poor eyesight. People who have conditions such as arthritis, osteoporosis, or dementia also have to be careful not to fall. You can make your home safer with a few simple measures. Follow-up care is a key part of your treatment and safety. Be sure to make and go to all appointments, and call your doctor if you are having problems. It's also a good idea to know your test results and keep a list of the medicines you take. How can you care for yourself at home? Taking care of yourself  · You may get dizzy if you do not drink enough water.  To prevent dehydration, drink plenty of fluids, enough so that your urine is light yellow or clear like water. Choose water and other caffeine-free clear liquids. If you have kidney, heart, or liver disease and have to limit fluids, talk with your doctor before you increase the amount of fluids you drink. · Exercise regularly to improve your strength, muscle tone, and balance. Walk if you can. Swimming may be a good choice if you cannot walk easily. · Have your vision and hearing checked each year or any time you notice a change. If you have trouble seeing and hearing, you might not be able to avoid objects and could lose your balance. · Know the side effects of the medicines you take. Ask your doctor or pharmacist whether the medicines you take can affect your balance. Sleeping pills or sedatives can affect your balance. · Limit the amount of alcohol you drink. Alcohol can impair your balance and other senses. · Ask your doctor whether calluses or corns on your feet need to be removed. If you wear loose-fitting shoes because of calluses or corns, you can lose your balance and fall. · Talk to your doctor if you have numbness in your feet. Preventing falls at home  · Remove raised doorway thresholds, throw rugs, and clutter. Repair loose carpet or raised areas in the floor. · Move furniture and electrical cords to keep them out of walking paths. · Use nonskid floor wax, and wipe up spills right away, especially on ceramic tile floors. · If you use a walker or cane, put rubber tips on it. If you use crutches, clean the bottoms of them regularly with an abrasive pad, such as steel wool. · Keep your house well lit, especially David Paget, and outside walkways. Use night-lights in areas such as hallways and bathrooms. Add extra light switches or use remote switches (such as switches that go on or off when you clap your hands) to make it easier to turn lights on if you have to get up during the night. · Install sturdy handrails on stairways.   · Move items in your cabinets so that the things you use a lot are on the lower shelves (about waist level). · Keep a cordless phone and a flashlight with new batteries by your bed. If possible, put a phone in each of the main rooms of your house, or carry a cell phone in case you fall and cannot reach a phone. Or, you can wear a device around your neck or wrist. You push a button that sends a signal for help. · Wear low-heeled shoes that fit well and give your feet good support. Use footwear with nonskid soles. Check the heels and soles of your shoes for wear. Repair or replace worn heels or soles. · Do not wear socks without shoes on wood floors. · Walk on the grass when the sidewalks are slippery. If you live in an area that gets snow and ice in the winter, sprinkle salt on slippery steps and sidewalks. Preventing falls in the bath  · Install grab bars and nonskid mats inside and outside your shower or tub and near the toilet and sinks. · Use shower chairs and bath benches. · Use a hand-held shower head that will allow you to sit while showering. · Get into a tub or shower by putting the weaker leg in first. Get out of a tub or shower with your strong side first.  · Repair loose toilet seats and consider installing a raised toilet seat to make getting on and off the toilet easier. · Keep your bathroom door unlocked while you are in the shower. Where can you learn more? Go to http://www.avery.com/. Enter [id number] [id number] in the search box to learn more about \"Preventing Falls: Care Instructions. \"  Current as of: March 16, 2018  Content Version: 11.8  © 4738-6945 [employer]. Care instructions adapted under license by [employer] (which disclaims liability or warranty for this information). If you have questions about a medical condition or this instruction, always ask your healthcare professional. [place] [address] any warranty or liability for your use of this information.

## 2020-11-09 NOTE — TELEPHONE ENCOUNTER
Received call from Nayeli Alvarez (RN From [facility]). She has concerns about patient's leg wound (from fall over the weekend). She states the wound is actively bleeding. She states she put a heavy dressing on the would. She states she is concerns about severe tremors, lethargy and a flat affect. Called patient's home and spoke with patient's daughter. Isacc Luis states patient's leg wound has bleed through the dressing in the 30 minutes since [facility] has left. Remamary alice Smith is concerned about \"tremors so severe she is unable to walk unassisted, even with rollator\". She states her mother is having periods where she is disoriented. Sameer Gan to bring her mother to the ED for active bleeding of leg wound. Gilford Gores NP and Rene Llanes NP made aware.

## 2020-11-09 NOTE — ED PROVIDER NOTES
EMERGENCY DEPARTMENT HISTORY AND PHYSICAL EXAM 
 
 
Date: 11/8/2020 Patient Name: Lawyer Machuca History of Presenting Illness Chief Complaint Patient presents with  Fall History Provided By: Patient and EMS 
 
HPI: Lawyer Machuca, 78 y.o. female with a past medical history significant LVAD presents to the ED with chief complaint of Fall Juan Carlos Kraft Patient has a long LVAD device. She had a mechanical fall while walking with a Rollator causing her leg to get caught on a precut. Significant bleeding on scene. Is on Coumadin. Patient presents as a trauma bravo. When she fell she did not hit her head. No loss of consciousness. No chest pain shortness of breath. Did hit her back on a dresser where she has an abrasion. No difficulty breathing. There are no other complaints, changes, or physical findings at this time. PCP: Cara Quesada NP Current Facility-Administered Medications Medication Dose Route Frequency Provider Last Rate Last Dose  gabapentin (NEURONTIN) capsule 300 mg  300 mg Oral TID Christy Negro MD   300 mg at 11/09/20 3413  diph,Perthira(AC),Tet Vac-PF (BOOSTRIX) suspension 0.5 mL  0.5 mL IntraMUSCular Konstantin Jessica MD   Stopped at 11/09/20 1422 Current Outpatient Medications Medication Sig Dispense Refill  ferrous sulfate (Iron) 325 mg (65 mg iron) tablet Take 1 Tab by mouth daily (with breakfast). 30 Tab 0  
 levoFLOXacin (Levaquin) 750 mg tablet Take 1 Tab by mouth every other day for 4 days. 2 Tab 0  cephALEXin (Keflex) 500 mg capsule Take 1 Cap by mouth two (2) times a day. 60 Cap 0  
 mexiletine (MEXITIL) 150 mg capsule Take 1 Cap by mouth three (3) times daily. Indications: ventricular arrhythmias, a type of abnormal heart rhythm 90 Cap 0  carvediloL (COREG) 25 mg tablet Take 0.5 Tabs by mouth two (2) times daily (with meals). 30 Tab 2  
 bumetanide (BUMEX) 2 mg tablet Take 1 Tab by mouth daily.  30 Tab 2  
  magnesium oxide (MAG-OX) 400 mg tablet Take 1 Tab by mouth two (2) times a day. 60 Tab 2  
 ergocalciferol (ERGOCALCIFEROL) 1,250 mcg (50,000 unit) capsule Take 1 Cap by mouth every seven (7) days. 14 Cap 0  
 icosapent ethyL (VASCEPA) 1 gram capsule Take 1 Cap by mouth two (2) times daily (with meals). 60 Cap 1  
 amLODIPine (NORVASC) 5 mg tablet Take 5 mg by mouth daily.  tolterodine (DETROL) 2 mg tablet Take 8 mg by mouth two (2) times a day.  albuterol sulfate (PROVENTIL IN) Take 1 Puff by inhalation four (4) times daily as needed.  budesonide-formoteroL (Symbicort) 160-4.5 mcg/actuation HFAA Take 2 Puffs by inhalation ACB/HS.  hydrOXYzine HCL (ATARAX) 10 mg tablet Take 10 mg by mouth three (3) times daily as needed for Itching (hives).  warfarin (COUMADIN) 1 mg tablet Take 4 mg by mouth daily.  PARoxetine (PAXIL) 30 mg tablet Take 30 mg by mouth daily.  gabapentin (NEURONTIN) 300 mg capsule Take 1 Cap by mouth daily. Max Daily Amount: 300 mg. 30 Cap 2  
 insulin detemir U-100 (LEVEMIR FLEXTOUCH) 100 unit/mL (3 mL) inpn 40 units at bedtime 1 Adjustable Dose Pre-filled Pen Syringe 2  
 aspirin 81 mg tablet Take 81 mg by mouth daily. Past History Past Medical History: 
Past Medical History:  
Diagnosis Date  Asthma  Cancer ([facility] breast  
 [facility]   
 endometrial  
 Congestive heart failure, unspecified  CRI (chronic renal insufficiency)  Depression  Diabetes [facility]  Hypertension Past Surgical History: 
Past Surgical History:  
Procedure Laterality Date  HX HERNIA REPAIR    
 HX HYSTERECTOMY  HX MASTECTOMY Family History: No family history on file. Social History: 
Social History Tobacco Use  Smoking status: Never Smoker  Smokeless tobacco: Never Used Substance Use Topics  Alcohol use: Not Currently  Drug use: Not on file Allergies: 
No Known Allergies Review of Systems Review of Systems Constitutional: Negative. Negative for chills, fatigue and fever. HENT: Negative. Negative for congestion, nosebleeds and sore throat. Eyes: Negative. Negative for pain, discharge and visual disturbance. Respiratory: Negative. Negative for cough, chest tightness and shortness of breath. Cardiovascular: Negative for chest pain, palpitations and leg swelling. Gastrointestinal: Negative for abdominal pain, blood in stool, constipation, diarrhea, nausea and vomiting. Endocrine: Negative. Genitourinary: Negative. Negative for difficulty urinating, dysuria, pelvic pain and vaginal bleeding. Musculoskeletal: Negative. Negative for arthralgias, back pain and myalgias. Skin: Positive for wound. Negative for rash. Allergic/Immunologic: Negative. Neurological: Negative. Negative for dizziness, syncope, weakness, numbness and headaches. Hematological: Negative. Psychiatric/Behavioral: Negative. Negative for agitation, confusion and suicidal ideas. All other systems reviewed and are negative. Physical Exam  
Physical Exam 
Vitals signs and nursing note reviewed. Exam conducted with a chaperone present. Constitutional:   
   Appearance: Normal appearance. She is normal weight. HENT:  
   Head: Normocephalic and atraumatic. Nose: Nose normal.  
   Mouth/Throat:  
   Mouth: Mucous membranes are moist.  
   Pharynx: Oropharynx is clear. Eyes:  
   Extraocular Movements: Extraocular movements intact. Conjunctiva/sclera: Conjunctivae normal.  
   Pupils: Pupils are equal, round, and reactive to light. Neck: Musculoskeletal: Normal range of motion and neck supple. Cardiovascular:  
   Rate and Rhythm: Normal rate and regular rhythm. Pulses: Normal pulses. Heart sounds: Normal heart sounds. Pulmonary:  
   Effort: Pulmonary effort is normal. No respiratory distress. Breath sounds: Normal breath sounds. Abdominal: General: Abdomen is flat. Bowel sounds are normal. There is no distension. Palpations: Abdomen is soft. Tenderness: There is no abdominal tenderness. There is no guarding. Musculoskeletal: Normal range of motion. General: No swelling, tenderness, deformity or signs of injury. Right lower leg: No edema. Left lower leg: No edema. Skin: 
   General: Skin is warm and dry. Capillary Refill: Capillary refill takes less than 2 seconds. Findings: No lesion or rash. Comments: Abrasion back R, LLE abulsion oozing wound Neurological:  
   General: No focal deficit present. Mental Status: She is alert and oriented to person, place, and time. Mental status is at baseline. Cranial Nerves: No cranial nerve deficit. Psychiatric:     
   Mood and Affect: Mood normal.     
   Behavior: Behavior normal.     
   Thought Content: Thought content normal.     
   Judgment: Judgment normal.  
 
 
 
Diagnostic Study Results Labs - Recent Results (from the past 12 hour(s)) URINALYSIS W/ REFLEX CULTURE Collection Time: 11/09/20 12:05 AM  
 Specimen: Urine Result Value Ref Range Color Yellow/Straw Appearance Turbid (A) Clear Specific gravity 1.013 1.003 - 1.030    
 pH (UA) 5.0 5.0 - 8.0 Protein 100 (A) Negative mg/dL Glucose Negative Negative mg/dL Ketone Negative Negative mg/dL Bilirubin Negative Negative Blood Moderate (A) Negative Urobilinogen 0.1 0.1 - 1.0 EU/dL Nitrites Negative Negative Leukocyte Esterase Trace (A) Negative UA:UC IF INDICATED Urine Culture Ordered (A) Culture not indicated by UA result WBC 0-5 0 - 4 /hpf  
 RBC 0-5 0 - 5 /hpf Bacteria Negative Negative /hpf  
CBC WITH AUTOMATED DIFF Collection Time: 11/09/20  1:15 AM  
Result Value Ref Range WBC 14.1 (H) 3.6 - 11.0 K/uL  
 RBC 3.20 (L) 3.80 - 5.20 M/uL HGB 8.1 (L) 11.5 - 16.0 g/dL HCT 27.9 (L) 35.0 - 47.0 % MCV 87.2 80.0 - 99.0 FL  
 MCH 25.3 (L) 26.0 - 34.0 PG  
 MCHC 29.0 (L) 30.0 - 36.5 g/dL  
 RDW 18.4 (H) 11.5 - 14.5 % PLATELET 327 090 - 525 K/uL MPV 11.3 8.9 - 12.9 FL  
 NEUTROPHILS 83 (H) 32 - 75 % LYMPHOCYTES 8 (L) 12 - 49 % MONOCYTES 6 5 - 13 % EOSINOPHILS 3 0 - 7 % BASOPHILS 0 0 - 1 % IMMATURE GRANULOCYTES 0 0.0 - 0.5 % ABS. NEUTROPHILS 11.8 (H) 1.8 - 8.0 K/UL  
 ABS. LYMPHOCYTES 1.1 0.8 - 3.5 K/UL  
 ABS. MONOCYTES 0.8 0.0 - 1.0 K/UL  
 ABS. EOSINOPHILS 0.5 (H) 0.0 - 0.4 K/UL  
 ABS. BASOPHILS 0.0 0.0 - 0.1 K/UL  
 ABS. IMM. GRANS. 0.1 (H) 0.00 - 0.04 K/UL  
 DF AUTOMATED METABOLIC PANEL, COMPREHENSIVE Collection Time: 11/09/20  1:15 AM  
Result Value Ref Range Sodium 138 136 - 145 mmol/L Potassium 5.6 (H) 3.5 - 5.1 mmol/L Chloride 102 97 - 108 mmol/L  
 CO2 30 21 - 32 mmol/L Anion gap 6 5 - 15 mmol/L Glucose 164 (H) 65 - 100 mg/dL BUN 79 (H) 6 - 20 mg/dL Creatinine 3.46 (H) 0.55 - 1.02 mg/dL BUN/Creatinine ratio 23 (H) 12 - 20 GFR est AA 16 (L) >60 ml/min/1.73m2 GFR est non-AA 13 (L) >60 ml/min/1.73m2 Calcium 8.8 8.5 - 10.1 mg/dL Bilirubin, total 0.3 0.2 - 1.0 mg/dL AST (SGOT) 13 (L) 15 - 37 U/L  
 ALT (SGPT) 8 (L) 12 - 78 U/L Alk. phosphatase 77 45 - 117 U/L Protein, total 7.3 6.4 - 8.2 g/dL Albumin 3.2 (L) 3.5 - 5.0 g/dL Globulin 4.1 (H) 2.0 - 4.0 g/dL A-G Ratio 0.8 (L) 1.1 - 2.2 PROTHROMBIN TIME + INR Collection Time: 11/09/20  1:15 AM  
Result Value Ref Range Prothrombin time 25.8 (H) 11.9 - 14.7 sec INR 2.4 (H) 0.9 - 1.1 Radiologic Studies -  
CT ABD PELV WO CONT Final Result IMPRESSION: No specific acute abnormality. Distended stool-filled rectum. Impaction? No evidence of obstruction proximal to  
this point. Mild colonic diverticulosis. Minimal subcutaneous fat changes right lateral abdominal wall. This may  
represent benign dependent changes if the patient lays on her right.  A mild  
contusion could also give this appearance. Correlate with history and physical  
exam.   
  
  
  
  
  
XR TIB/FIB LT Final Result IMPRESSION: No specific acute or active process. XR CHEST SNGL V Final Result Impression: No acute findings. CT Results  (Last 48 hours) 11/09/20 0158  CT ABD PELV WO CONT Final result Impression:  IMPRESSION: No specific acute abnormality. Distended stool-filled rectum. Impaction? No evidence of obstruction proximal to  
this point. Mild colonic diverticulosis. Minimal subcutaneous fat changes right lateral abdominal wall. This may  
represent benign dependent changes if the patient lays on her right. A mild  
contusion could also give this appearance. Correlate with history and physical  
exam.   
   
   
   
   
  
 Narrative:  PROCEDURE: CT ABD PELV WO CONT  
   
HISTORY:Flank pain COMPARISON:None Department policy stipulates all CT scans at this facility are performed using  
dose reduction optimization techniques as appropriate to the performed exam,  
including the following: Automated exposure control, adjustments of the mA  
and/or KVP according to the patient size, and the use of iterative  
reconstruction technique. LIMITATIONS: Respiratory motion in the upper abdomen TECHNIQUE: Axial images with multiplanar reconstruction. No IV contrast.  
   
CHEST: No acute airspace process or pleural effusion seen at the lung bases. Heart remains enlarged with left ventricular assist device in place. LIVER: Normal  
GALLBLADDER: Normal  
BILIARY TREE: Normal  
PANCREAS: Normal  
SPLEEN: Normal  
ADRENAL GLANDS: Normal  
KIDNEYS/URETERS: Both kidneys show an element of cortical thinning. No  
hydronephrosis, stone disease or renal mass. Urinary bladder is unremarkable RETROPERITONEUM/AORTA: Atherosclerotic changes in the aorta and its branches. No  
aneurysm or periaortic pathology BOWEL/MESENTERY: Stomach and small bowel are unremarkable. The colon shows mild  
diverticulosis without evidence of diverticulitis. The rectum is somewhat  
distended with fecal material. There is no bowel wall thickening. There is no  
dilatation of the colon proximal to the rectum. APPENDIX: Identified and normal  
PERITONEAL CAVITY: No free air or fluid REPRODUCTIVE: Removed BONE/TISSUES: No acute process. On the right there is mild stranding in the  
subcutaneous fat along the lateral abdominal wall at the level of the lower  
margin of the rib cage down to the iliac region. No similar finding is seen on  
the left. OTHER: None CXR Results  (Last 48 hours) 11/08/20 2248  XR CHEST SNGL V Final result Impression:  Impression: No acute findings. Narrative:  XR CHEST SNGL V    
11/8/2020 10:48 PM ; [facility] Indication: 78years old; Female ; fall. R flank pain ;  
   
Technique: Single frontal radiograph view of the chest.  
   
Comparison: 10/28/2020 Findings: Monitoring contacts and leads overlie the anterior chest wall. Median  
sternotomy wires. AICD and leads in stable position. Left ventricular assist  
device. Lungs are clear. Cardiac silhouette mildly enlarged. No large pneumothorax or  
pneumoperitoneum, or significant pleural fluid. The osseous structures are unremarkable for age. Medical Decision Making and ED Course I am the first provider for this patient. I reviewed the vital signs, available nursing notes, past medical history, past surgical history, family history and social history. Vital Signs-Reviewed the patient's vital signs. Patient Vitals for the past 12 hrs: 
 Temp Pulse Resp 11/09/20 0228  80 24  
11/08/20 2235 98.4 °F (36.9 °C) 81 22 EKG interpretation:  
 
 
 
Records Reviewed: Previous Hospital chart. EMS run report ED Course: Initial assessment performed. The patients presenting problems have been discussed, and they are in agreement with the care plan formulated and outlined with them. I have encouraged them to ask questions as they arise throughout their visit. Orders Placed This Encounter  CULTURE, URINE Standing Status:   Standing Number of Occurrences:   1  XR CHEST SNGL V Standing Status:   Standing Number of Occurrences:   1 Order Specific Question:   Transport Answer:   BED [2] Order Specific Question:   Reason for Exam  
  Answer:   fall. R flank pain  XR TIB/FIB LT  
  Standing Status:   Standing Number of Occurrences:   1 Order Specific Question:   Transport Answer:   BED [2] Order Specific Question:   Reason for Exam  
  Answer:   gen  CT ABD PELV WO CONT Standing Status:   Standing Number of Occurrences:   1 Order Specific Question:   Transport Answer:   BED [2] Order Specific Question:   Reason for Exam  
  Answer:   flank pain Order Specific Question:   Type of contrast.  PLEASE NOTE: IV contrast is NOT utilized with this order. Answer:   None  URINALYSIS W/ REFLEX CULTURE Standing Status:   Standing Number of Occurrences:   1  CBC WITH AUTOMATED DIFF Standing Status:   Standing Number of Occurrences:   1  METABOLIC PANEL, COMPREHENSIVE Standing Status:   Standing Number of Occurrences:   1  PROTHROMBIN TIME + INR Standing Status:   Standing Number of Occurrences:   1  diph,Pertuss(AC),Tet Vac-PF (BOOSTRIX) suspension 0.5 mL  gabapentin (NEURONTIN) capsule 300 mg  
 acetaminophen (TYLENOL) tablet 1,000 mg  
 
 
 
  
 
CONSULTANTS: 
Consults Provider Notes (Medical Decision Making):  
62-year-old female presents as a trauma bravo after ground-level fall.   Differential diagnosis includes fracture, wound bleeding, arterial injury, pneumothorax, internal bleeding. Negative work-up including lab work with stable vitals. Discharge stable. Procedures Disposition Emergency Department Disposition:  Discharged DISCHARGE PLAN: 
 
Patient is discharged home. Discharge instructions provided. Patient is stable and improved at time of disposition. Vitals are stable. 1.  
Current Discharge Medication List  
  
CONTINUE these medications which have NOT CHANGED Details  
ferrous sulfate (Iron) 325 mg (65 mg iron) tablet Take 1 Tab by mouth daily (with breakfast). Qty: 30 Tab, Refills: 0  
  
levoFLOXacin (Levaquin) 750 mg tablet Take 1 Tab by mouth every other day for 4 days. Qty: 2 Tab, Refills: 0  
  
cephALEXin (Keflex) 500 mg capsule Take 1 Cap by mouth two (2) times a day. Qty: 60 Cap, Refills: 0  
  
mexiletine (MEXITIL) 150 mg capsule Take 1 Cap by mouth three (3) times daily. Indications: ventricular arrhythmias, a type of abnormal heart rhythm 
Qty: 90 Cap, Refills: 0  
  
carvediloL (COREG) 25 mg tablet Take 0.5 Tabs by mouth two (2) times daily (with meals). Qty: 30 Tab, Refills: 2  
  
bumetanide (BUMEX) 2 mg tablet Take 1 Tab by mouth daily. Qty: 30 Tab, Refills: 2  
  
magnesium oxide (MAG-OX) 400 mg tablet Take 1 Tab by mouth two (2) times a day. Qty: 60 Tab, Refills: 2  
  
ergocalciferol (ERGOCALCIFEROL) 1,250 mcg (50,000 unit) capsule Take 1 Cap by mouth every seven (7) days. Qty: 14 Cap, Refills: 0  
  
icosapent ethyL (VASCEPA) 1 gram capsule Take 1 Cap by mouth two (2) times daily (with meals). Qty: 60 Cap, Refills: 1  
  
amLODIPine (NORVASC) 5 mg tablet Take 5 mg by mouth daily. tolterodine (DETROL) 2 mg tablet Take 8 mg by mouth two (2) times a day. albuterol sulfate (PROVENTIL IN) Take 1 Puff by inhalation four (4) times daily as needed. budesonide-formoteroL (Symbicort) 160-4.5 mcg/actuation HFAA Take 2 Puffs by inhalation ACB/HS. hydrOXYzine HCL (ATARAX) 10 mg tablet Take 10 mg by mouth three (3) times daily as needed for Itching (hives). warfarin (COUMADIN) 1 mg tablet Take 4 mg by mouth daily. PARoxetine (PAXIL) 30 mg tablet Take 30 mg by mouth daily. gabapentin (NEURONTIN) 300 mg capsule Take 1 Cap by mouth daily. Max Daily Amount: 300 mg. Qty: 30 Cap, Refills: 2 Associated Diagnoses: Neuropathy  
  
insulin detemir U-100 (LEVEMIR FLEXTOUCH) 100 unit/mL (3 mL) inpn 40 units at bedtime 
Qty: 1 Adjustable Dose Pre-filled Pen Syringe, Refills: 2  
  
aspirin 81 mg tablet Take 81 mg by mouth daily. 2.  
Follow-up Information Follow up With Specialties Details Why Contact Info Jailene Zhao NP Nurse Practitioner Schedule an appointment as soon as possible for a visit  [address] [place], [place] [id number] [phone] 3. Return to ED if worse Pt voiced they understand they plan and do not have questions at this time Diagnosis Clinical Impression: 1. Abrasion of back, unspecified laterality, initial encounter 2. Wound of left lower extremity, initial encounter 3. Fall, initial encounter Attestations: 
 
Olive Loco MD 
 
Please note that this dictation was completed with [employer], the computer voice recognition software. Quite often unanticipated grammatical, syntax, homophones, and other interpretive errors are inadvertently transcribed by the computer software. Please disregard these errors. Please excuse any errors that have escaped final proofreading. Thank you.

## 2020-11-09 NOTE — TELEPHONE ENCOUNTER
Received a message from patient's daughter. Deborah Reis was unable to get patient to car as her leg was bleeding and patient was unable to assist with walking. Melinda called [phone]. EMS moved patient to Melinda's car but declined to transport patient to ED. Called ED to advise of patient's pending arrival and need for assistance from the car.

## 2020-11-09 NOTE — ED PROVIDER NOTES
Ms. Kaylen Galvin is a 67yo female who presents to the ER with complaints of altered mental status and bleeding from the leg. She was discharged 2 days ago from the hospital.  Over the last 2 days, she is becoming drowsy and drowsier and less active. She has had several falls. She fell yesterday and had a cut to her left leg. She went to an outside hospital ER. Given the nature of the wound, it was not sutured. However, she has had considerable bleeding from the wound since then. No complaints of pain. No fevers. No cough. Daughter states that she has began to have his mental status changes while in the hospital but they worsened when she went home. The patient's history is noted by her current mental status. Most of the history was obtained by the daughter. Past Medical History:  
Diagnosis Date  Asthma  Cancer ([facility] [facility] breast  
 Cancer ([facility] [facility]   
 endometrial  
 Congestive heart failure, unspecified  CRI (chronic renal insufficiency)  Depression  Diabetes ([facility]  Hypertension Past Surgical History:  
Procedure Laterality Date  HX HERNIA REPAIR    
 HX HYSTERECTOMY  HX MASTECTOMY History reviewed. No pertinent family history. Social History Socioeconomic History  Marital status:  Spouse name: Not on file  Number of children: Not on file  Years of education: Not on file  Highest education level: Not on file Occupational History  Not on file Social Needs  Financial resource strain: Not on file  Food insecurity Worry: Not on file Inability: Not on file  Transportation needs Medical: Not on file Non-medical: Not on file Tobacco Use  Smoking status: Never Smoker  Smokeless tobacco: Never Used Substance and Sexual Activity  Alcohol use: Not Currently  Drug use: Not on file  Sexual activity: Not on file Lifestyle  Physical activity Days per week: Not on file Minutes per session: Not on file  Stress: Not on file Relationships  Social connections Talks on phone: Not on file Gets together: Not on file Attends Protestant service: Not on file Active member of club or organization: Not on file Attends meetings of clubs or organizations: Not on file Relationship status: Not on file  Intimate partner violence Fear of current or ex partner: Not on file Emotionally abused: Not on file Physically abused: Not on file Forced sexual activity: Not on file Other Topics Concern  Not on file Social History Narrative  Not on file ALLERGIES: Patient has no known allergies. Review of Systems Unable to perform ROS: Mental status change Constitutional: Positive for fatigue. Skin: Positive for wound. Vitals:  
 11/09/20 1435 Pulse: 67 Resp: 20 Temp: 98 °F (36.7 °C) SpO2: 96% Physical Exam  
 
Vital signs reviewed. Nursing notes reviewed. Const:  No acute distress, well developed, well nourished Head:  Atraumatic, normocephalic Eyes:  PERRL, conjunctiva normal, no scleral icterus Neck:  Supple, trachea midline Cardiovascular: Regular rate Resp:  No resp distress, no increased work of breathing Abd:  Soft, non-tender, non-distended, no rebound MSK:  No pedal edema, normal ROM Neuro: Drowsy but arousable, no cranial nerve defect Skin: Large open wound to the left leg with approximately 7 cm x 3 cm with active oozing of blood Psych: normal mood and affect, behavior is normal, judgement and thought content is normal 
 
 
 
 
MDM Number of Diagnoses or Management Options Amount and/or Complexity of Data Reviewed Clinical lab tests: ordered and reviewed Tests in the radiology section of CPT®: ordered and reviewed Review and summarize past medical records: yes Patient Progress Patient progress: stable Hemostasis Date/Time: 11/9/2020 5:10 PM 
 Performed by: Collin Ritchie MD 
Authorized by: Collin Ritchie MD  
 
Consent:  
  Consent obtained:  Verbal 
  Consent given by:  Patient Risks discussed:  Pain and bleeding Indications:  
  Indications:  Bleeding from leg wound Anesthesia (see MAR for exact dosages): Anesthesia method:  None Comments:  
   Dressings were removed from the left leg. She continued to have wheezing from that leg. The wound was quite large and when it is did not approximate easily. There may have been a partial avulsion of the skin. Quick clot was placed over the wound and was covered with Surgicel. There is no bleeding through this. The wound was covered with gauze and wrapped with an Ace wrap. Perfect Serve Consult for Admission 7:42 PM 
 
ED Room Number: ER10/10 Patient Name and age:  Tianna Hdz 78 y.o.  female Working Diagnosis: 1. Acute encephalopathy 2. Blood loss anemia 3. Acute renal failure, unspecified acute renal failure type ([facility] [address] COVID-19 Suspicion:  no 
Sepsis present:  no  Reassessment needed: no 
Code Status:  Full Code Readmission: yes Isolation Requirements:  no 
Recommended Level of Care:  med/surg Department:[facility] Adult ED - [phone] Other:  I spoke with the heart failure team.  They will follow the patient. 7:49 PM 
Her dressing is still in place without any further bleeding after several hours. Ms. Laurence Smith is a 67yo female who presents to the ER with complaints of AMS. Pt. has continued to have bleeding from her wound in her left leg. The injury occurred a little bit less than 24 hours prior to me seeing her in the ER. The wound edges do not approximate easily and there is some avulsed skin. I think that she has a considerable risk for infection if it were to close it. I was able to achieve hemostasis with quick clot gauze over the wound.   She is anemic with a hemoglobin of 6, which is likely related to bleeding. Pt. Also found to have worsening renal function. I have spoken with the heart failure team, who will follow the patient. I have begun to transfuse her with 1 unit of prbcs. Pt.  To be evaluated for admission by the hospitalist.

## 2020-11-10 PROBLEM — S81.802A OPEN WOUND OF LEFT LOWER LEG: Status: ACTIVE | Noted: 2020-01-01

## 2020-11-10 PROBLEM — D68.9 COAGULOPATHY (HCC): Status: ACTIVE | Noted: 2020-01-01

## 2020-11-10 NOTE — PROGRESS NOTES
Admission Medication Reconciliation: 
 
Information obtained from:  Medication list, family member RxQuery data available¹:  YES Comments/Recommendations: Updated PTA meds/reviewed patient's allergies. 1)  Medication list updated 2)  Medication changes (since last review): Added 
- None Adjusted - Amlodipine to 2.5 mg bid - Levemir from 40 units to 25 units - Symbicort is prn Removed 
- None 3)  Patient has stopped taking levofloxacin due to current condition 4)  Cephalexin is supposed to start tomorrow as outpatient. ¹RxQuery pharmacy benefit data reflects medications filled and processed through the patient's insurance, however  
this data does NOT capture whether the medication was picked up or is currently being taken by the patient. Allergies:  Patient has no known allergies. Significant PMH/Disease States:  
Past Medical History:  
Diagnosis Date Asthma Cancer ([facility] [address] breast  
 [facility]   
 endometrial  
 Congestive heart failure, unspecified CRI (chronic renal insufficiency) Depression Diabetes ([facility] Hypertension Chief Complaint for this Admission: Chief Complaint Patient presents with Bleeding/Bruising Lethargy Prior to Admission Medications:  
Prior to Admission Medications Prescriptions Last Dose Informant Taking? PARoxetine (PAXIL) 30 mg tablet   Yes Sig: Take 30 mg by mouth daily. albuterol sulfate (PROVENTIL IN)   Yes Sig: Take 1 Puff by inhalation four (4) times daily as needed. amLODIPine (NORVASC) 5 mg tablet 11/9/2020 at Unknown time  Yes Sig: Take 2.5 mg by mouth two (2) times a day. aspirin 81 mg tablet 11/9/2020 at Unknown time  Yes Sig: Take 81 mg by mouth daily. budesonide-formoteroL (Symbicort) 160-4.5 mcg/actuation HFAA   Yes Sig: Take 2 Puffs by inhalation two (2) times daily as needed. bumetanide (BUMEX) 2 mg tablet   Yes Sig: Take 1 Tab by mouth daily. carvediloL (COREG) 25 mg tablet   Yes Sig: Take 0.5 Tabs by mouth two (2) times daily (with meals). cephALEXin (Keflex) 500 mg capsule  at Unknown time  Yes Sig: Take 1 Cap by mouth two (2) times a day. ergocalciferol (ERGOCALCIFEROL) 1,250 mcg (50,000 unit) capsule 2020  Yes Sig: Take 1 Cap by mouth every seven (7) days. ferrous sulfate (Iron) 325 mg (65 mg iron) tablet 2020 at Unknown time  Yes Sig: Take 1 Tab by mouth daily (with breakfast). gabapentin (NEURONTIN) 300 mg capsule 2020 at Unknown time  Yes Sig: Take 1 Cap by mouth daily. Max Daily Amount: 300 mg.  
hydrOXYzine HCL (ATARAX) 10 mg tablet   Yes Sig: Take 10 mg by mouth three (3) times daily as needed for Itching (hives). icosapent ethyL (VASCEPA) 1 gram capsule 2020 at Unknown time  Yes Sig: Take 1 Cap by mouth two (2) times daily (with meals). insulin detemir U-100 (Levemir U-100 Insulin) 100 unit/mL injection   Yes Si Units by SubCUTAneous route nightly. levoFLOXacin (Levaquin) 750 mg tablet   Yes Sig: Take 1 Tab by mouth every other day for 4 days. magnesium oxide (MAG-OX) 400 mg tablet   Yes Sig: Take 1 Tab by mouth two (2) times a day. mexiletine (MEXITIL) 150 mg capsule   Yes Sig: Take 1 Cap by mouth three (3) times daily. Indications: ventricular arrhythmias, a type of abnormal heart rhythm  
tolterodine (DETROL) 2 mg tablet   Yes Sig: Take 8 mg by mouth two (2) times a day. warfarin (COUMADIN) 1 mg tablet 2020 at Unknown time  Yes Sig: Take 4 mg by mouth daily. Facility-Administered Medications: None Please contact the main inpatient pharmacy with any questions or concerns at [phone] and we will direct you to the clinical pharmacist covering this patient's care while in-house.   
Rosamaria Amanda, LUZ ELENAD

## 2020-11-10 NOTE — CONSULTS
[address] [facility] in [place], [place] Heart Failure Inpatient Progress Note Patient name: Kai Ross Patient : 1950 Patient MRN: [id number] Date of service: 11/10/20 CHIEF COMPLAINT: 
Cellulitis 
  
PLAN: 
Admitted for suspected worsening of pump infection, ROCIO on CKD, and acute blood loss anemia due to traumatic leg injury s/p fall [address] 11/3 shows CVP 14 mmHg, PA 36/18 mmHg, PCWP 15 mmHg, Sal CI 2.4 Continue current device speed increased to 9400 with low speed limit of 9000; intolerant to higher speed due to VT 
PYP strongly suggestive of aTTR amyloidosis; will consider addition of tafamidis as OP (will require patient assistance) Hold Coreg due to hypotension Intolerant of ACEi/ARB/ARNI due to aTTR Intolerant of spironolactone due to hyperkalemia Hold diuretics Renal consult pending Hold ASA and warfarin due to acute wound bleeding Goal INR 2-2.5 (2.4 today) Note General Surgery recs to hold [facility] to allow for wound healing - in setting of active infection and increased risk of thrombosis prefer to maintain some level of AC (maybe lower goal?) Transfuse to maintain Hgb > 7; management per Hospitalist  
Antibiotics per ID - consulted; trend LA and PCT, blood cultures DTC consult recs appreciated, HgbA1C -8.4% NPH and SSI per DTC recs Ambulate daily/PT/OT Nutrition consult ordered GI, urogynecology, and pulmonary consults as OP IMPRESSION: 
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 
 S/p HeartMate 2 LVAD implantation (17 by Dr. José Nichole at [facility]) · C/b delayed extubation due to severe COPD 
· C/b critical illness polyneuropathy · C/b prolonged hospitalization post-LVAD, 55 days · C/b sternal wound infection s/p debridement (by Dr. José Nichole) s/p wound vac · C/b sacral ulcer · Would culture positive for Staph aureus, not MRSA · C/b LVAD site drainage, improved S/p CRT-ICD · ICD fired due to electrolyte imbalance () H/o breast cancer () · s/p bilateral mastectomy/chemo and endometrial cancer s/p hysterectomy · Lymphedema of LLE due to cancer treatment Severe COPD with FEV1 50% Depression Atrial fibrillation H/o \"two mini strokes\" S/p fall with hip facture · Right hip hemmiarthroplasty (18) by Dr. Chaka Choe) · S/p removed hip hardwarare due to pain (4/15/19) COPD severe CKD, stage 3 Hyperkalemia Pulmonary hypertension Cardiac risk factors: · Morbid obesity, There is no height or weight on file to calculate BMI. · DM2 insulin dependent · JED on CPAP 
· HL Urinary incontinence, severe · no procedures due to anticoagulation · conservative management with Detrol 4 pills bid Endometrial cancer () HTN 
HL 
aTTR amyloidosis  
  
CARDIAC IMAGING: 
Echo (19) LVEF 20-25%, AV opens 1:1, no AR Echo (18) LVEF 10-%, ramps study done, report in [facility] Echo (18) ramp study done, LVEDD 7.1cm LHC (18) 2 vessel disease with 90% OM, 80% PDA, DSA to PDA branch 
TTE (10/27/20) LVEF 15-20% LVIDd 7.26cm RVIDd 2.82cm Tapse 1.14cm 
  
HEMODYNAMICS: 
RHC not done CPEST not done 6MW not done 
  
OTHER IMAGING: 
EGD/C-scope (19) no active bleeding and polyp removed. 
  
FAMILY HISTORY: 
Mother  76 years, diagnosed with DM, HTN, CAD Father  [de-identified]years old, HTN, CAD, MI 
Positive for DM and heart disease in the family, brother with valve replacement 
  
SOCIAL HISTORY: 
 Never alcohol, never smoked, , lives alone, no illicit drug use, lives in house, ambulatory status indpendedn, children: daughter, occupation retired Lives alone. 
  
ALLERGIES: benzodiazepines and quetiapine fumerate (lethargy, resp failure to both) 
  
HISTORY OF PRESENT ILLNESS: 
Susanne Jarquin a 78 y. o. female with multiple comorbidities who was transferred from [facility] LVAD [facility].  She recently had a prolonged hospitalization at [facility] from 10/27/20-11/7/20 for treatment of volume overload, LVAD pump infection, VT, and debility. She was discharged home on 11/7 and shortly thereafter became dizzy and fell, severely injuring her left shin. She was seen in the ER for wound care and discharged home. She continued to complain of weakness, dizziness, fatigue, and altered balance. She was brought via personal vehicle to [facility] ED where she was noted to have significant ROCIO on CKD, acute blood loss anemia, and suspected worsening pump infection. [facility] has been consulted for assistance with the management of her LVAD.  
  
REVIEW OF SYSTEMS: 
General: Denies fever, fatigue Ear, nose and throat: Denies difficulty hearing, sinus problems, runny nose, post-nasal drip, ringing in ears, mouth sores, loose teeth, ear pain, nosebleeds, sore throate, facial pain or numbess Cardiovascular: see above in the interval history Respiratory: denies cough, wheezing, sputum production, or hemoptysis. Gastrointestinal: Denies heartburn, constipation, intolerance to certain foods, diarrhea, abdominal pain, nausea, vomiting, difficulty swallowing, blood in stool Kidney and bladder: Denies painful urination, frequent urination, or urgency Musculoskeletal: Denies joint pain, muscle weakness, +hand tremors Skin and hair: Denies change in hair loss or increase, breast changes Redness and edema, LLE wound s/p fall, skin breakdown RLE. PHYSICAL EXAM: 
Visit Vitals Pulse 75 Temp 99.6 °F (37.6 °C) Resp 20 SpO2 95% LVAD Pump Speed (RPM): 9400 Pump Flow (LPM): 5.9 PI (Pulsitility Index): 4.5 Power: 6.2 General: Patient is morbidly obese in no acute distress, sitting up in chair HEENT: Normocephalic and atraumatic. No scleral icterus. Pupils are equal, round and reactive to light and accomodation. No conjunctival injection. Oropharynx is clear. Neck: Supple. No evidence of thyroid enlargements or lymphadenopathy. JVD: Cannot be appreciated Lungs: Breath sounds are equal and clear bilaterally. No wheezes, rhonchi, or rales. Heart: VAD hum Abdomen: Soft, obese, no mass or tenderness. No organomegaly or hernia. Bowel sounds present. Genitourinary and rectal: deferred Extremities: No cyanosis, clubbing, Redness and edema, skin breakdown RLE; BLE edema 2+, venous stasis, +LLE wound Neurologic: No focal sensory or motor deficits are noted. Grossly intact. Psychiatric: Awake, alert and oriented x 3. Appropriate mood and affect. Skin: Warm, dry, no nodules + petechiae on extremities PAST MEDICAL HISTORY: 
Past Medical History:  
Diagnosis Date  Asthma  Cancer ([address] breast  
 Cancer ([address]   
 endometrial  
 Congestive heart failure, unspecified  CRI (chronic renal insufficiency)  Depression  Diabetes ([address]  Hypertension PAST SURGICAL HISTORY: 
Past Surgical History:  
Procedure Laterality Date  HX HERNIA REPAIR    
 HX HYSTERECTOMY  HX MASTECTOMY FAMILY HISTORY: 
History reviewed. No pertinent family history. SOCIAL HISTORY: 
Social History Socioeconomic History  Marital status:  Spouse name: Not on file  Number of children: Not on file  Years of education: Not on file  Highest education level: Not on file Tobacco Use  Smoking status: Never Smoker  Smokeless tobacco: Never Used Substance and Sexual Activity  Alcohol use: Not Currently LABORATORY RESULTS: 
 Labs Latest Ref Rng & Units 11/10/2020 11/10/2020 11/9/2020 11/9/2020 11/7/2020 11/6/2020 11/5/2020 WBC 3.6 - 11.0 K/uL 10.1 11. 4(H) 11. 8(H) 14. 1(H) 7.4 6.4 6.6 RBC 3.80 - 5.20 M/uL 2.64(L) 2.44(L) 2.68(L) 3.20(L) 3.68(L) 3.82 3.66(L) Hemoglobin 11.5 - 16.0 g/dL 7. 0(L) 6. 3(L) 6. 9(L) 8. 1(L) 9.3(L) 9.7(L) 9.4(L) Hematocrit 35.0 - 47.0 % 22. 7(L) 21. 0(L) 23. 1(L) 27. 9(L) 31. 8(L) 32. 4(L) 31. 5(L) MCV 80.0 - 99.0 FL 86.0 86.1 86.2 87.2 86.4 84.8 86.1 Platelets 379 - 761 K/uL 144(L) 157 202 183 185 183 170 Lymphocytes 12 - 49 % 11(L) 11(L) 10(L) 8(L) - - - Monocytes 5 - 13 % 7 8 5 6 - - - Eosinophils 0 - 7 % 2 2 1 3 - - - Basophils 0 - 1 % 0 0 0 0 - - - Albumin 3.5 - 5.0 g/dL - 2. 9(L) 3.0(L) 3. 2(L) 3. 2(L) 3.0(L) 3.0(L) Calcium 8.5 - 10.1 MG/DL 8.6 9.0 8.8 8.8 9.1 9.5 9.1 Glucose 65 - 100 mg/dL 174(H) 170(H) 192(H) 164(H) 80 108(H) 95 BUN 6 - 20 MG/DL 94(H) 92(H) 90(H) 79(H) 70(H) 64(H) 61(H) Creatinine 0.55 - 1.02 MG/DL 4.89(H) 4.96(H) 4.58(H) 3.46(H) 2.52(H) 2.23(H) 2.24(H) Sodium 136 - 145 mmol/L 137 138 136 138 138 137 140 Potassium 3.5 - 5.1 mmol/L 5.0 5.2(H) 6. 0(H) 5. 6(H) 4.8 4.8 4.5 TSH 0.450 - 4.500 uIU/mL - - - - - - -  
LDH 81 - 246 U/L - - - - 298(H) 391(H) 312(H) Some recent data might be hidden ALLERGY: 
No Known Allergies CURRENT MEDICATIONS: 
 
Current Facility-Administered Medications:  
  carvediloL (COREG) tablet 12.5 mg, 12.5 mg, Oral, BID WITH MEALS, Jarred El MD, 12.5 mg at 11/10/20 8464   ferrous sulfate tablet 325 mg, 325 mg, Oral, DAILY WITH BREAKFAST, CHARLENE Ventura M, DO 
  docusate sodium (COLACE) capsule 100 mg, 100 mg, Oral, BID, Jayme KATE DO, 100 mg at 11/10/20 1351 
  gabapentin (NEURONTIN) capsule 300 mg, 300 mg, Oral, DAILY, Meche Ventura DO, 300 mg at 11/10/20 1351   hydrOXYzine HCL (ATARAX) tablet 10 mg, 10 mg, Oral, TID PRN, Meche Garcia DO 
  PARoxetine (PAXIL) tablet 30 mg, 30 mg, Oral, DAILY, Shadi Garcia, DO, Stopped at 11/10/20 1021 
  docusate sodium (COLACE) capsule 100 mg, 100 mg, Oral, BID, Bayron Ventura , DO 
  glucose chewable tablet 16 g, 4 Tab, Oral, PRN, [facility], DO 
  glucagon (GLUCAGEN) injection 1 mg, 1 mg, IntraMUSCular, PRN, [facility], DO 
  dextrose 10% infusion 0-250 mL, 0-250 mL, IntraVENous, PRN, Pedro Luis Freeman [facility], DO 
  insulin lispro (HUMALOG) injection, , SubCUTAneous, AC&HS, [facility], DO, 2 Units at 11/10/20 1130   [START ON 11/11/2020] cefepime (MAXIPIME) 2 g in 0.9% sodium chloride (MBP/ADV) 100 mL MBP, 2 g, IntraVENous, Q24H, Bayron Ventura , DO 
  0.9% sodium chloride infusion 250 mL, 250 mL, IntraVENous, PRN, Cj Harrell MD 
  sodium chloride (NS) flush 5-40 mL, 5-40 mL, IntraVENous, Q8H, Linda Mcbride MD, 10 mL at 11/10/20 3951   sodium chloride (NS) flush 5-40 mL, 5-40 mL, IntraVENous, PRN, Linda Mcbride MD 
  acetaminophen (TYLENOL) tablet 650 mg, 650 mg, Oral, Q6H PRN **OR** acetaminophen (TYLENOL) suppository 650 mg, 650 mg, Rectal, Q6H PRN, Linda Mcbride MD 
  polyethylene glycol (MIRALAX) packet 17 g, 17 g, Oral, DAILY PRN, Linda Mcbride MD 
  promethazine (PHENERGAN) tablet 12.5 mg, 12.5 mg, Oral, Q6H PRN **OR** ondansetron (ZOFRAN) injection 4 mg, 4 mg, IntraVENous, Q6H PRN, Linda Mcbride MD 
 
Current Outpatient Medications:  
  insulin detemir U-100 (Levemir U-100 Insulin) 100 unit/mL injection, 25 Units by SubCUTAneous route nightly., Disp: , Rfl:  
  ferrous sulfate (Iron) 325 mg (65 mg iron) tablet, Take 1 Tab by mouth daily (with breakfast). , Disp: 30 Tab, Rfl: 0 
  levoFLOXacin (Levaquin) 750 mg tablet, Take 1 Tab by mouth every other day for 4 days. , Disp: 2 Tab, Rfl: 0 
  cephALEXin (Keflex) 500 mg capsule, Take 1 Cap by mouth two (2) times a day., Disp: 60 Cap, Rfl: 0 
  mexiletine (MEXITIL) 150 mg capsule, Take 1 Cap by mouth three (3) times daily.  Indications: ventricular arrhythmias, a type of abnormal heart rhythm, Disp: 90 Cap, Rfl: 0 
  carvediloL (COREG) 25 mg tablet, Take 0.5 Tabs by mouth two (2) times daily (with meals). , Disp: 30 Tab, Rfl: 2 
  bumetanide (BUMEX) 2 mg tablet, Take 1 Tab by mouth daily. , Disp: 30 Tab, Rfl: 2 
  magnesium oxide (MAG-OX) 400 mg tablet, Take 1 Tab by mouth two (2) times a day., Disp: 60 Tab, Rfl: 2 
  ergocalciferol (ERGOCALCIFEROL) 1,250 mcg (50,000 unit) capsule, Take 1 Cap by mouth every seven (7) days. , Disp: 14 Cap, Rfl: 0 
  icosapent ethyL (VASCEPA) 1 gram capsule, Take 1 Cap by mouth two (2) times daily (with meals). , Disp: 60 Cap, Rfl: 1 
  amLODIPine (NORVASC) 5 mg tablet, Take 2.5 mg by mouth two (2) times a day., Disp: , Rfl:  
  tolterodine (DETROL) 2 mg tablet, Take 8 mg by mouth two (2) times a day., Disp: , Rfl:  
  albuterol sulfate (PROVENTIL IN), Take 1 Puff by inhalation four (4) times daily as needed. , Disp: , Rfl:  
  budesonide-formoteroL (Symbicort) 160-4.5 mcg/actuation HFAA, Take 2 Puffs by inhalation two (2) times daily as needed. , Disp: , Rfl:  
  hydrOXYzine HCL (ATARAX) 10 mg tablet, Take 10 mg by mouth three (3) times daily as needed for Itching (hives). , Disp: , Rfl:  
  warfarin (COUMADIN) 1 mg tablet, Take 4 mg by mouth daily. , Disp: , Rfl:  
  PARoxetine (PAXIL) 30 mg tablet, Take 30 mg by mouth daily. , Disp: , Rfl:  
  gabapentin (NEURONTIN) 300 mg capsule, Take 1 Cap by mouth daily. Max Daily Amount: 300 mg., Disp: 30 Cap, Rfl: 2 
  aspirin 81 mg tablet, Take 81 mg by mouth daily. , Disp: , Rfl:  
 
Thank you for your referral and allowing me to participate in this patient's care. [facility] [address], [address] Phone: [phone] Fax: [phone] PATIENT CARE TEAM: 
Patient Care Team: 
Eloisa Harris NP as PCP - General (Nurse Practitioner)

## 2020-11-10 NOTE — H&P
[facility] History and Physical 
 
Primary Care Provider: Brayden Hardy NP Date of Service:  11/9/2020 Subjective:  
 
Endy Ash is a 78 y.o. female with past medical history significant for chronic systolic heart failure, ischemic cardiomyopathy with LVAD in place, hypertension, diabetes mellitus type 2 presented emergency room with increased fatigue and fall. Patient was recently admitted to the hospital and discharged on November 7, 2020 for similar hospital after being treated for cellulitis of the right lower extremity. Daughter states that since he was discharged patient has been more fatigue and having involuntary movement of her body. During that hospital stay she was started on 2 new medications including Levaquin and mexiletine. Daughter said that yesterday she fell at home injuring her left leg. She has an open wound and to the emergency room. She states that she had wound packed and sent home. Today daughter noticed that the wound started bleeding significantly for which she came to the emergency room. In the emergency room her work-up showed a hemoglobin of 6.9 down from8. As well she was found to have with worsening renal function. Admission was requested for ROCIO and anemia. In the ed wound was significantly bleeding. ED physician stopped bleeding with quick clot and its currently wrapped. Review of Systems: 
 
Review of Systems Constitutional: Positive for malaise/fatigue. Negative for chills, fever and weight loss. HENT: Negative for congestion, ear discharge and hearing loss. Eyes: Negative for blurred vision and double vision. Respiratory: Negative for cough, sputum production, shortness of breath and wheezing. Cardiovascular: Negative for chest pain, palpitations, orthopnea, leg swelling and PND. Gastrointestinal: Negative for abdominal pain, constipation, diarrhea, melena, nausea and vomiting. Genitourinary: Negative for dysuria, frequency and urgency. Musculoskeletal: Positive for falls. Negative for back pain, joint pain, myalgias and neck pain. Leg pain Skin: Negative for itching and rash. Neurological: Positive for weakness (generalized weakness). Negative for dizziness, sensory change, speech change, focal weakness and headaches. Endo/Heme/Allergies: Negative for polydipsia. Does not bruise/bleed easily. Past Medical History:  
Diagnosis Date  Asthma  Cancer ([facility] breast  
 Cancer ([facility]   
 endometrial  
 Congestive heart failure, unspecified  CRI (chronic renal insufficiency)  Depression  Diabetes ([facility]  Hypertension Past Surgical History:  
Procedure Laterality Date  HX HERNIA REPAIR    
 HX HYSTERECTOMY  HX MASTECTOMY Prior to Admission medications Medication Sig Start Date End Date Taking? Authorizing Provider  
ferrous sulfate (Iron) 325 mg (65 mg iron) tablet Take 1 Tab by mouth daily (with breakfast). 11/8/20   Meche Frausto, DO  
levoFLOXacin (Levaquin) 750 mg tablet Take 1 Tab by mouth every other day for 4 days. 11/8/20 11/12/20  Mau KATE, DO  
cephALEXin (Keflex) 500 mg capsule Take 1 Cap by mouth two (2) times a day. 11/7/20   Meche Frausto, DO  
mexiletine (MEXITIL) 150 mg capsule Take 1 Cap by mouth three (3) times daily. Indications: ventricular arrhythmias, a type of abnormal heart rhythm 11/7/20   CHARLENE Frausto M, DO  
carvediloL (COREG) 25 mg tablet Take 0.5 Tabs by mouth two (2) times daily (with meals). 11/5/20   Sophia Walter NP  
bumetanide (BUMEX) 2 mg tablet Take 1 Tab by mouth daily. 11/6/20   Sophia Walter NP  
magnesium oxide (MAG-OX) 400 mg tablet Take 1 Tab by mouth two (2) times a day. 11/5/20   Sophia Walter NP  
ergocalciferol (ERGOCALCIFEROL) 1,250 mcg (50,000 unit) capsule Take 1 Cap by mouth every seven (7) days.  10/5/20   Wilson Romero NP  
 icosapent ethyL (VASCEPA) 1 gram capsule Take 1 Cap by mouth two (2) times daily (with meals). 10/5/20   Jaime Limon, NP  
amLODIPine (NORVASC) 5 mg tablet Take 5 mg by mouth daily. Provider, Historical  
tolterodine (DETROL) 2 mg tablet Take 8 mg by mouth two (2) times a day. Provider, Historical  
albuterol sulfate (PROVENTIL IN) Take 1 Puff by inhalation four (4) times daily as needed. Provider, Historical  
budesonide-formoteroL (Symbicort) 160-4.5 mcg/actuation HFAA Take 2 Puffs by inhalation ACB/HS. Provider, Historical  
hydrOXYzine HCL (ATARAX) 10 mg tablet Take 10 mg by mouth three (3) times daily as needed for Itching (hives). Provider, Historical  
warfarin (COUMADIN) 1 mg tablet Take 4 mg by mouth daily. Provider, Historical  
PARoxetine (PAXIL) 30 mg tablet Take 30 mg by mouth daily. Provider, Historical  
gabapentin (NEURONTIN) 300 mg capsule Take 1 Cap by mouth daily. Max Daily Amount: 300 mg. 10/1/20   Anthony Walter NP  
insulin detemir U-100 (LEVEMIR FLEXTOUCH) 100 unit/mL (3 mL) inpn 40 units at bedtime 10/1/20   Anthony Walter NP  
aspirin 81 mg tablet Take 81 mg by mouth daily. Provider, Historical  
 
No Known Allergies History reviewed. No pertinent family history. SOCIAL HISTORY: 
Patient resides at home with family. Patient ambulates with assistance Smoking history: None Alcohol history: none Objective:  
 
 
Physical Exam:  
Patient Vitals for the past 12 hrs: 
 Temp Pulse Resp BP SpO2  
11/09/20 2030  77 23  95 % 11/09/20 2000  84 18  95 % 11/09/20 1930  78 22  90 % 11/09/20 1900  98 22  97 % 11/09/20 1830  79 27  97 % 11/09/20 1800  79 22    
11/09/20 1730  75 26  98 % 11/09/20 1700  75 22  100 % 11/09/20 1630  83 27  94 % 11/09/20 1435 98 °F (36.7 °C) 67 20  96 % GEN APPEARANCE: Ill appearing HEENT: Conjunctiva Clear CVS: LVAD Hum heard. LUNGS: CTAB; No Wheezes; No Rhonchi: No rales ABD: Soft; No TTP; + Normoactive BS 
EXT: No edema of the right leg. Edema of the left leg, Leg is wrapped. 1+ DP bilarally. Skin exam: Hemosiderin deposits on bilateral extremities consistent with venous stasis. Bruising of the distal aspect of the left leg. MENTAL STATUS: Lethargic but arousable and responds to commands. Neuro: No gross motor or sensory deficits Data Review:  
Recent Results (from the past 24 hour(s)) AMB PT/INR EXTERNAL Collection Time: 11/09/20 12:00 AM  
Result Value Ref Range INR, External 2.4 URINALYSIS W/ REFLEX CULTURE Collection Time: 11/09/20 12:05 AM  
 Specimen: Urine Result Value Ref Range Color Yellow/Straw Appearance Turbid (A) Clear Specific gravity 1.013 1.003 - 1.030    
 pH (UA) 5.0 5.0 - 8.0 Protein 100 (A) Negative mg/dL Glucose Negative Negative mg/dL Ketone Negative Negative mg/dL Bilirubin Negative Negative Blood Moderate (A) Negative Urobilinogen 0.1 0.1 - 1.0 EU/dL Nitrites Negative Negative Leukocyte Esterase Trace (A) Negative UA:UC IF INDICATED Urine Culture Ordered (A) Culture not indicated by UA result WBC 0-5 0 - 4 /hpf  
 RBC 0-5 0 - 5 /hpf Bacteria Negative Negative /hpf  
CBC WITH AUTOMATED DIFF Collection Time: 11/09/20  1:15 AM  
Result Value Ref Range WBC 14.1 (H) 3.6 - 11.0 K/uL  
 RBC 3.20 (L) 3.80 - 5.20 M/uL HGB 8.1 (L) 11.5 - 16.0 g/dL HCT 27.9 (L) 35.0 - 47.0 % MCV 87.2 80.0 - 99.0 FL  
 MCH 25.3 (L) 26.0 - 34.0 PG  
 MCHC 29.0 (L) 30.0 - 36.5 g/dL  
 RDW 18.4 (H) 11.5 - 14.5 % PLATELET 593 316 - 610 K/uL MPV 11.3 8.9 - 12.9 FL  
 NEUTROPHILS 83 (H) 32 - 75 % LYMPHOCYTES 8 (L) 12 - 49 % MONOCYTES 6 5 - 13 % EOSINOPHILS 3 0 - 7 % BASOPHILS 0 0 - 1 % IMMATURE GRANULOCYTES 0 0.0 - 0.5 % ABS. NEUTROPHILS 11.8 (H) 1.8 - 8.0 K/UL  
 ABS. LYMPHOCYTES 1.1 0.8 - 3.5 K/UL  
 ABS. MONOCYTES 0.8 0.0 - 1.0 K/UL ABS. EOSINOPHILS 0.5 (H) 0.0 - 0.4 K/UL  
 ABS. BASOPHILS 0.0 0.0 - 0.1 K/UL  
 ABS. IMM. GRANS. 0.1 (H) 0.00 - 0.04 K/UL  
 DF AUTOMATED METABOLIC PANEL, COMPREHENSIVE Collection Time: 11/09/20  1:15 AM  
Result Value Ref Range Sodium 138 136 - 145 mmol/L Potassium 5.6 (H) 3.5 - 5.1 mmol/L Chloride 102 97 - 108 mmol/L  
 CO2 30 21 - 32 mmol/L Anion gap 6 5 - 15 mmol/L Glucose 164 (H) 65 - 100 mg/dL BUN 79 (H) 6 - 20 mg/dL Creatinine 3.46 (H) 0.55 - 1.02 mg/dL BUN/Creatinine ratio 23 (H) 12 - 20 GFR est AA 16 (L) >60 ml/min/1.73m2 GFR est non-AA 13 (L) >60 ml/min/1.73m2 Calcium 8.8 8.5 - 10.1 mg/dL Bilirubin, total 0.3 0.2 - 1.0 mg/dL AST (SGOT) 13 (L) 15 - 37 U/L  
 ALT (SGPT) 8 (L) 12 - 78 U/L Alk. phosphatase 77 45 - 117 U/L Protein, total 7.3 6.4 - 8.2 g/dL Albumin 3.2 (L) 3.5 - 5.0 g/dL Globulin 4.1 (H) 2.0 - 4.0 g/dL A-G Ratio 0.8 (L) 1.1 - 2.2 PROTHROMBIN TIME + INR Collection Time: 11/09/20  1:15 AM  
Result Value Ref Range Prothrombin time 25.8 (H) 11.9 - 14.7 sec INR 2.4 (H) 0.9 - 1.1 EKG, 12 LEAD, INITIAL Collection Time: 11/09/20  2:55 PM  
Result Value Ref Range Ventricular Rate 134 BPM  
 Atrial Rate 300 BPM  
 QRS Duration 26 ms  
 Q-T Interval 180 ms QTC Calculation (Bezet) 268 ms Calculated R Axis 0 degrees Calculated T Axis -100 degrees Diagnosis Ventricular paced rhythm Indeterminate axis When compared with ECG of 04-NOV-2020 13:56, 
 
Vent. rate has increased BY   6 BPM 
Confirmed by Pat Montiel MD, Shar Dixon ([id number]) on 11/9/2020 4:46:00 PM 
  
CBC WITH AUTOMATED DIFF Collection Time: 11/09/20  6:19 PM  
Result Value Ref Range WBC 11.8 (H) 3.6 - 11.0 K/uL  
 RBC 2.68 (L) 3.80 - 5.20 M/uL HGB 6.9 (L) 11.5 - 16.0 g/dL HCT 23.1 (L) 35.0 - 47.0 % MCV 86.2 80.0 - 99.0 FL  
 MCH 25.7 (L) 26.0 - 34.0 PG  
 MCHC 29.9 (L) 30.0 - 36.5 g/dL  
 RDW 18.4 (H) 11.5 - 14.5 % PLATELET 155 958 - 683 K/uL MPV 10.8 8.9 - 12.9 FL  
 NRBC 0.0 0  WBC ABSOLUTE NRBC 0.00 0.00 - 0.01 K/uL NEUTROPHILS 82 (H) 32 - 75 % LYMPHOCYTES 10 (L) 12 - 49 % MONOCYTES 5 5 - 13 % EOSINOPHILS 1 0 - 7 % BASOPHILS 0 0 - 1 % IMMATURE GRANULOCYTES 2 (H) 0.0 - 0.5 % ABS. NEUTROPHILS 9.7 (H) 1.8 - 8.0 K/UL  
 ABS. LYMPHOCYTES 1.2 0.8 - 3.5 K/UL  
 ABS. MONOCYTES 0.6 0.0 - 1.0 K/UL  
 ABS. EOSINOPHILS 0.1 0.0 - 0.4 K/UL  
 ABS. BASOPHILS 0.0 0.0 - 0.1 K/UL  
 ABS. IMM. GRANS. 0.2 (H) 0.00 - 0.04 K/UL  
 DF SMEAR SCANNED    
 RBC COMMENTS ANISOCYTOSIS 
1+ METABOLIC PANEL, COMPREHENSIVE Collection Time: 11/09/20  6:19 PM  
Result Value Ref Range Sodium 136 136 - 145 mmol/L Potassium 6.0 (H) 3.5 - 5.1 mmol/L Chloride 100 97 - 108 mmol/L  
 CO2 25 21 - 32 mmol/L Anion gap 11 5 - 15 mmol/L Glucose 192 (H) 65 - 100 mg/dL BUN 90 (H) 6 - 20 MG/DL Creatinine 4.58 (H) 0.55 - 1.02 MG/DL  
 BUN/Creatinine ratio 20 12 - 20 GFR est AA 12 (L) >60 ml/min/1.73m2 GFR est non-AA 10 (L) >60 ml/min/1.73m2 Calcium 8.8 8.5 - 10.1 MG/DL Bilirubin, total 0.4 0.2 - 1.0 MG/DL  
 ALT (SGPT) 7 (L) 12 - 78 U/L  
 AST (SGOT) 17 15 - 37 U/L Alk. phosphatase 74 45 - 117 U/L Protein, total 7.2 6.4 - 8.2 g/dL Albumin 3.0 (L) 3.5 - 5.0 g/dL Globulin 4.2 (H) 2.0 - 4.0 g/dL A-G Ratio 0.7 (L) 1.1 - 2.2 SAMPLES BEING HELD Collection Time: 11/09/20  6:19 PM  
Result Value Ref Range SAMPLES BEING HELD 1RED COMMENT Add-on orders for these samples will be processed based on acceptable specimen integrity and analyte stability, which may vary by analyte. PTT Collection Time: 11/09/20  6:19 PM  
Result Value Ref Range aPTT 46.2 (H) 22.1 - 32.0 sec  
 aPTT, therapeutic range     58.0 - 77.0 SECS  
PROTHROMBIN TIME + INR Collection Time: 11/09/20  6:19 PM  
Result Value Ref Range INR 2.6 (H) 0.9 - 1.1 Prothrombin time 25.7 (H) 9.0 - 11.1 sec TYPE & SCREEN Collection Time: 11/09/20  6:46 PM  
Result Value Ref Range Crossmatch Expiration 11/12/2020,2359 ABO/Rh(D) O NEGATIVE Antibody screen PENDING Antibody ID PENDING   
RBC, ALLOCATE Collection Time: 11/09/20  7:00 PM  
Result Value Ref Range HISTORY CHECKED? Historical check performed Xr Chest Sngl V Result Date: 11/8/2020 Impression: No acute findings. Xr Tib/fib Lt Result Date: 11/8/2020 IMPRESSION: No specific acute or active process. Ct Abd Pelv Wo Cont Result Date: 11/9/2020 IMPRESSION: No specific acute abnormality. Distended stool-filled rectum. Impaction? No evidence of obstruction proximal to this point. Mild colonic diverticulosis. Minimal subcutaneous fat changes right lateral abdominal wall. This may represent benign dependent changes if the patient lays on her right. A mild contusion could also give this appearance. Correlate with history and physical exam.  
 
Xr Chest [employer] Result Date: 11/9/2020 IMPRESSION: No change. Assessment:  
 
Active Problems: 
  Anemia (11/9/2020) ROCIO (acute kidney injury) ([facility] [address] (11/9/2020) Plan: 1. Acute on chronic anemia: Likely secondary to bleed from 1. The ED provider ordered 1 unit of PRBC to be given now. Repeat H&H after blood infusion complete and representative. Monitor volume status closely. 2.  Acute on chronic renal failure: Multifactorial likely secondary to hypovolemia due to bleed, versus medication induced ie levaquin - Will give fluid with blood transfusion 
- monitor UOP 4. Hyperkalemia: due to ROCIO 
- I have ordered, regular insulin plus glucose, calcium gluconate, amp of bicarb. Repeat potassium level once meds given. 5.  Chronic systolic heart failure NYHA class IV, ischemic cardiomyopathy status post LVAD Hold Bumex due to ROCIO.   Patient appears more hypovolemic on exam. 
 Continue with Coreg 12.5 mg twice daily. Patient on Coumadin. INR 2.6. Recheck in the morning. If subtherapeutic will start patient on a heparin drip given bleeding from the wound at this time.  Not on Entresto ACE inhibitor, ARB is due to CKD 6. Open wound of the left leg: Traumatic wound. No active signs of bleeding at this time. Given the erythema and swelling of the leg will cover empirically with antibiotic. Will have surgery evaluate him for a short leg. Obtain  x-ray to rule out fracture. Wound care consult placed. 7.  Diabetes mellitus type 2: 
Sliding scale insulin before meals and at bedtime. 8. H/o Recurrent Vtach, 11/5: 
-. Initiated on mexiletine which daughter thinks is causing her involuntary movement. 
-Will consult Dr. Raj Romo 
 
9. COPD: stable-Continue maintenance inhaled steroids. Bronchodilators as needed. 10. Depression: stable.  Continue  Paxil 11. Morbid obesity: Body mass index is 46.34 kg/m².  OP management DVT prophy; on coumadin Code status: full code Plan discussed with patient and daughter who is at bedside. FUNCTIONAL STATUS PRIOR TO HOSPITALIZATION Ambulatory with Use of Assistive Devices (including history of recent falls): yesterday Signed By: [place] Jaime MD   
 November 9, 2020

## 2020-11-10 NOTE — PROGRESS NOTES
[facility] [facility] Adult  Hospitalist Group Hospitalist Progress Note [address] [facility] [address],  Answering service: [phone] OR [phone] from in house phone Date of Service:  11/10/2020 NAME:  Iraida Cruz :  1950 MRN:  [id number] Admission Summary:  
78 y.o. female with past medical history significant for chronic systolic heart failure, ischemic cardiomyopathy with LVAD in place, hypertension, diabetes mellitus type 2 presented emergency room with increased fatigue and fall. Patient was recently admitted to the hospital and discharged on 2020 for similar hospital after being treated for cellulitis of the right lower extremity. Daughter states that since he was discharged patient has been more fatigue and having involuntary movement of her body. During that hospital stay she was started on 2 new medications including Levaquin and mexiletine. Daughter said that yesterday she fell at home injuring her left leg. She has an open wound and to the emergency room. She states that she had wound packed and sent home. Today daughter noticed that the wound started bleeding significantly for which she came to the emergency room. In the emergency room her work-up showed a hemoglobin of 6.9 down from8. As well she was found to have with worsening renal function. Admission was requested for ROCIO and anemia. In the ed wound was significantly bleeding. ED physician stopped bleeding with quick clot and its currently wrapped.  
  
 
Interval history / Subjective: Follow up acute anemia, wound. Patient seen and examined. Weaker from baseline. No active bleeding. Has bilateral lower extremity abnormal movements. Daughter at bedside and provides history. Reviewed hospitalization. Infusing 1 unit pRBCs today. Awaiting additional input from subspecialities. Assessment & Plan: New right leg wound, s/p fall Acute on chronic anemia: Likely secondary to bleed from leg wound -s/p 1 unit pRBCs, recheck post transfusion 
-continue cefepime  
-wound care and general surgery consult 
-Xray negative for fracture Acute on chronic renal failure: Multifactorial likely secondary to hypovolemia due to bleed 
-s/p 1 unit pRBCs 
-hold diuretics, will follow F recommendations regarding fluid management  
  
Hyperkalemia: due to ROCIO 
-s/p regular insulin plus glucose, calcium gluconate, amp of bicarb 
-repeat labs later today 
  
Chronic systolic heart failure NYHA class IV, ischemic cardiomyopathy status post LVAD Hold Bumex due to ROCIO. Patient appears more hypovolemic on exam. 
Continue with Coreg 12.5 mg twice daily. INR stable, will monitor  Not on Entresto ACE inhibitor, ARB is due to CKD 
  
Diabetes mellitus type 2: 
Sliding scale insulin before meals and at bedtime. 
  
 H/o Recurrent Vtach, 11/5: 
- Initiated on mexiletine which daughter thinks is causing her involuntary movement. 
-Will consult Dr. Lukasz Salomon 
  
COPD: stable-Continue maintenance inhaled steroids. Bronchodilators as needed. 
  
Depression: stable.  Continue Paxil Morbid obesity: Body mass index is 46.34 kg/m².  OP management 
  
Code status: full DVT prophylaxis: theurapeutic INR Care Plan discussed with: Patient/Family Anticipated Disposition: Home w/Family Anticipated Discharge: Greater than 48 hours Hospital Problems  Date Reviewed: 10/28/2020 Codes Class Noted POA Anemia ICD-10-CM: D64.9 ICD-9-CM: 285.9  11/9/2020 Unknown [facility] ICD-10-CM: N17.9 ICD-9-CM: 584.9  11/9/2020 Unknown Review of Systems:  
Negative unless stated above Vital Signs:  
 Last 24hrs VS reviewed since prior progress note. Most recent are: 
Visit Vitals Pulse 91 Temp 99.7 °F (37.6 °C) Resp 23 SpO2 93% No intake or output data in the 24 hours ending 11/10/20 0855 Physical Examination:  
 
I had a face to face encounter with this patient and independently examined them on 11/10/2020 as outlined below: 
 
     
Constitutional:  No acute distress, cooperative, pleasant, appears drowsy ENT:  Oral mucosa moist, oropharynx benign. Resp:  CTA bilaterally. Limited due to body habitus. No accessory muscle use CV:  LVAD hum, no murmurs, gallops, rubs GI:  Soft, non distended, obese, non tender. normoactive bowel sounds, no hepatosplenomegaly Musculoskeletal:  No edema, left leg wrapped, reviewed open wound pictures Neurologic:  Moves all extremities, random twitches in extremities, more prominent in lower extremities Data Review:  
 Review and/or order of clinical lab test 
Review and/or order of tests in the radiology section of CPT Review and/or order of tests in the medicine section of CPT Labs:  
 
Recent Labs 11/10/20 
0511 11/09/20 
1819 WBC 11.4* 11.8* HGB 6.3* 6.9*  
HCT 21.0* 23.1*  
 202 Recent Labs 11/10/20 
0511 11/09/20 
1819 11/09/20 
0115  136 138  
K 5.2* 6.0* 5.6*  
 100 102 CO2 28 25 30 BUN 92* 90* 79* CREA 4.96* 4.58* 3.46* * 192* 164* CA 9.0 8.8 8.8 Recent Labs 11/10/20 
0511 11/09/20 
1819 11/09/20 
0115 ALT 8* 7* 8* AP 69 74 77 TBILI 0.3 0.4 0.3 TP 6.7 7.2 7.3 ALB 2.9* 3.0* 3.2*  
GLOB 3.8 4.2* 4.1* Recent Labs 11/10/20 
0511 11/09/20 
1819 11/09/20 
0115 INR 2.4* 2.6* 2.4* PTP 24.5* 25.7* 25.8* APTT  --  46.2*  -- No results for input(s): FE, TIBC, PSAT, FERR in the last 72 hours. Lab Results Component Value Date/Time Folate 10.0 10/28/2020 03:56 AM  
  
No results for input(s): PH, PCO2, PO2 in the last 72 hours. No results for input(s): CPK, CKNDX, TROIQ in the last 72 hours. No lab exists for component: CPKMB Lab Results Component Value Date/Time Cholesterol, total 192 10/01/2020 12:00 AM  
 HDL Cholesterol 29 (L) 10/01/2020 12:00 AM  
 LDL Chol Calc (NIH) 123 (H) 10/01/2020 12:00 AM  
 Triglyceride 222 (H) 10/01/2020 12:00 AM  
 
Lab Results Component Value Date/Time Glucose (POC) 215 (H) 11/10/2020 05:06 AM  
 Glucose (POC) 184 (H) 11/10/2020 03:13 AM  
 Glucose (POC) 170 (H) 11/10/2020 01:48 AM  
 Glucose (POC) 163 (H) 11/10/2020 12:42 AM  
 Glucose (POC) 163 (H) 11/09/2020 10:44 PM  
 
Lab Results Component Value Date/Time Color Yellow/Straw 11/09/2020 12:05 AM  
 Appearance Turbid (A) 11/09/2020 12:05 AM  
 Specific gravity 1.013 11/09/2020 12:05 AM  
 pH (UA) 5.0 11/09/2020 12:05 AM  
 Protein 100 (A) 11/09/2020 12:05 AM  
 Glucose Negative 11/09/2020 12:05 AM  
 Ketone Negative 11/09/2020 12:05 AM  
 Bilirubin Negative 11/09/2020 12:05 AM  
 Urobilinogen 0.1 11/09/2020 12:05 AM  
 Nitrites Negative 11/09/2020 12:05 AM  
 Leukocyte Esterase Trace (A) 11/09/2020 12:05 AM  
 Bacteria Negative 11/09/2020 12:05 AM  
 WBC 0-5 11/09/2020 12:05 AM  
 RBC 0-5 11/09/2020 12:05 AM  
 
 
 
Medications Reviewed:  
 
Current Facility-Administered Medications Medication Dose Route Frequency  carvediloL (COREG) tablet 12.5 mg  12.5 mg Oral BID WITH MEALS  glucose chewable tablet 16 g  4 Tab Oral PRN  
 glucagon (GLUCAGEN) injection 1 mg  1 mg IntraMUSCular PRN  
 dextrose 10% infusion 0-250 mL  0-250 mL IntraVENous PRN  
 insulin lispro (HUMALOG) injection   SubCUTAneous AC&HS  [START ON 11/11/2020] cefepime (MAXIPIME) 2 g in 0.9% sodium chloride (MBP/ADV) 100 mL  2 g IntraVENous Q24H  cefepime (MAXIPIME) 2 g in 0.9% sodium chloride (MBP/ADV) 100 mL  2 g IntraVENous NOW  
 0.9% sodium chloride infusion 250 mL  250 mL IntraVENous PRN  
 sodium chloride (NS) flush 5-40 mL  5-40 mL IntraVENous Q8H  
 sodium chloride (NS) flush 5-40 mL  5-40 mL IntraVENous PRN  
 acetaminophen (TYLENOL) tablet 650 mg  650 mg Oral Q6H PRN  Or  
  acetaminophen (TYLENOL) suppository 650 mg  650 mg Rectal Q6H PRN  polyethylene glycol (MIRALAX) packet 17 g  17 g Oral DAILY PRN  promethazine (PHENERGAN) tablet 12.5 mg  12.5 mg Oral Q6H PRN Or  
 ondansetron (ZOFRAN) injection 4 mg  4 mg IntraVENous Q6H PRN  
 dextrose 10% infusion 125-250 mL  125-250 mL IntraVENous PRN Current Outpatient Medications Medication Sig  
 ferrous sulfate (Iron) 325 mg (65 mg iron) tablet Take 1 Tab by mouth daily (with breakfast).  levoFLOXacin (Levaquin) 750 mg tablet Take 1 Tab by mouth every other day for 4 days.  cephALEXin (Keflex) 500 mg capsule Take 1 Cap by mouth two (2) times a day.  mexiletine (MEXITIL) 150 mg capsule Take 1 Cap by mouth three (3) times daily. Indications: ventricular arrhythmias, a type of abnormal heart rhythm  carvediloL (COREG) 25 mg tablet Take 0.5 Tabs by mouth two (2) times daily (with meals).  bumetanide (BUMEX) 2 mg tablet Take 1 Tab by mouth daily.  magnesium oxide (MAG-OX) 400 mg tablet Take 1 Tab by mouth two (2) times a day.  ergocalciferol (ERGOCALCIFEROL) 1,250 mcg (50,000 unit) capsule Take 1 Cap by mouth every seven (7) days.  icosapent ethyL (VASCEPA) 1 gram capsule Take 1 Cap by mouth two (2) times daily (with meals).  amLODIPine (NORVASC) 5 mg tablet Take 5 mg by mouth daily.  tolterodine (DETROL) 2 mg tablet Take 8 mg by mouth two (2) times a day.  albuterol sulfate (PROVENTIL IN) Take 1 Puff by inhalation four (4) times daily as needed.  budesonide-formoteroL (Symbicort) 160-4.5 mcg/actuation HFAA Take 2 Puffs by inhalation ACB/HS.  hydrOXYzine HCL (ATARAX) 10 mg tablet Take 10 mg by mouth three (3) times daily as needed for Itching (hives).  warfarin (COUMADIN) 1 mg tablet Take 4 mg by mouth daily.  PARoxetine (PAXIL) 30 mg tablet Take 30 mg by mouth daily.  gabapentin (NEURONTIN) 300 mg capsule Take 1 Cap by mouth daily. Max Daily Amount: 300 mg.  insulin detemir U-100 (LEVEMIR FLEXTOUCH) 100 unit/mL (3 mL) inpn 40 units at bedtime  aspirin 81 mg tablet Take 81 mg by mouth daily. ______________________________________________________________________ EXPECTED LENGTH OF STAY: - - - 
ACTUAL LENGTH OF STAY:          1 [address] [facility],

## 2020-11-10 NOTE — ED NOTES
1049 Shift change report given to [facility], RN  (oncoming nurse) by Maki Amezcua RN  (offgoing nurse). Report included the following information SBAR and ED Summary. 1138 pt had BM in brief; tino care performed; linens, bed pad, socks changed 1256 Bedside shift change report given to Jose Nair RN  (oncoming nurse) by [facility], RN (offgoing nurse). Report included the following information SBAR and ED Summary.

## 2020-11-10 NOTE — PROGRESS NOTES
1300: Bedside shift change report given to Mo Ventura (oncoming nurse) by Katie Mclaughlin (offgoing nurse). Report included the following information SBAR and Kardex.

## 2020-11-10 NOTE — CONSULTS
Consult Date: 11/10/2020 Consults #1-avulsion injury left anterior calf pretibial area from fall and shear injury, leaving open wound with peripheral nonviable skin. This cannot be closed due to 48 hours of being open but in addition there is no way to close this wound at this point given its location and medical issues. #2-chronic venous stasis changes bilateral legs #3 chronic heart failure with LVAD in place and on chronic anticoagulation Coumadin #4-acute blood loss anemia from leg wound #5-history of diabetes mellitus, hypertension, morbid obesity, mobility issues Recommendations: Ideal we would hold the Coumadin for some period of time but I understand this is rather critical with her heart failure and LVAD issues. It is just that being anticoagulated will create additional wound care problems with dressing changes but probably can manage if the anticoagulation is critical.  Regarding the open wound there is nothing much surgical we can do for this at this point, I debrided the clearly nonviable skin and subcutaneous tissue see below procedure note. Reviewed with patient and daughter, will apply Xeroform gauze, roll gauze and Ace wrap from the foot up, this wound will probably take a couple of months to heal based on venous hypertension venous stasis changes diabetes chronic edema and overall poor mobility and medical issues. Should not need antibiotics no signs of infection at this point. May take months for this to heal patient already has home health from what I understand. If wound not healing may need Doppler studies of the leg but does not look grossly ischemic. I have put in for wound care consult as well. Could consider Aquacel but I am concerned that it could stick to the wound and on removal make cause more bleeding. Procedure:  With appropriate verbal consent, sharp scissors and pickups were used to excisionally debride skin and subcutaneous tissue for surgical debridement of total 5.5 cm x 2 cm of skin and nonviable subcutaneous tissue back to what appeared to be healthy tissue. I expect some of the peripheral tissue to continue with epidermal lysis over time. May need further surgical debridement later. Face-to-face time and care of patient, 70 minutes Subjective Patient is an unfortunate 80-year-old female with multiple medical problems including chronic renal disease, obesity, venous stasis changes, diabetes, poor mobility, congestive heart failure chronic, on Coumadin, who fell and sheared left anterior calf 48 hours ago, seen at outside hospital thought the bleeding was controlled but was transferred to [facility] for ongoing bleeding to the left leg wound controlled with clotting material in the emergency room. She has lost a fair amount of blood hemoglobin down to 6.9 g being admitted for blood transfusion and further management. Surgery was consulted for the wound. Past Medical History:  
Diagnosis Date  Asthma  Cancer ([facility] [address] breast  
 Cancer ([facility] [address]   
 endometrial  
 Congestive heart failure, unspecified  CRI (chronic renal insufficiency)  Depression  Diabetes ([facility] [address]  Hypertension Past Surgical History:  
Procedure Laterality Date  HX HERNIA REPAIR    
 HX HYSTERECTOMY  HX MASTECTOMY History reviewed. No pertinent family history. Social History Tobacco Use  Smoking status: Never Smoker  Smokeless tobacco: Never Used Substance Use Topics  Alcohol use: Not Currently Current Facility-Administered Medications Medication Dose Route Frequency Provider Last Rate Last Dose  carvediloL (COREG) tablet 12.5 mg  12.5 mg Oral BID WITH MEALS Ludivina Richards MD   12.5 mg at 11/10/20 0916  
 glucose chewable tablet 16 g  4 Tab Oral PRN Ludivina Richards MD      
 glucagon [facility]) injection 1 mg  1 mg IntraMUSCular PRN Ludivina Richards MD      
  dextrose 10% infusion 0-250 mL  0-250 mL IntraVENous PRN Danette Walsh MD      
 insulin lispro (HUMALOG) injection   SubCUTAneous AC&HS Danette Walsh MD   2 Units at 11/10/20 3829  [START ON 11/11/2020] cefepime (MAXIPIME) 2 g in 0.9% sodium chloride (MBP/ADV) 100 mL  2 g IntraVENous Q24H Danette Walsh MD      
 cefepime (MAXIPIME) 2 g in 0.9% sodium chloride (MBP/ADV) 100 mL  2 g IntraVENous NOW Danette Walsh  mL/hr at 11/10/20 0951 2 g at 11/10/20 7023  ferrous sulfate tablet 325 mg  325 mg Oral DAILY WITH BREAKFAST Bob Ventura,       
 gabapentin (NEURONTIN) capsule 300 mg  300 mg Oral DAILY Melvi KATE,       
 hydrOXYzine HCL (ATARAX) tablet 10 mg  10 mg Oral TID PRN Melvi KATE,       
 PARoxetine (PAXIL) tablet 30 mg  30 mg Oral DAILY Meche Funes,       
 docusate sodium (COLACE) capsule 100 mg  100 mg Oral BID Meche Ventura DO      
 0.9% sodium chloride infusion 250 mL  250 mL IntraVENous PRN Raghu Flannery MD      
 sodium chloride (NS) flush 5-40 mL  5-40 mL IntraVENous Q8H Danette Walsh MD   10 mL at 11/10/20 8128  sodium chloride (NS) flush 5-40 mL  5-40 mL IntraVENous PRN Danette Walsh MD      
 acetaminophen (TYLENOL) tablet 650 mg  650 mg Oral Q6H PRN Danette Walsh MD      
 Or  
 acetaminophen (TYLENOL) suppository 650 mg  650 mg Rectal Q6H PRN Danette Walsh MD      
 polyethylene glycol (MIRALAX) packet 17 g  17 g Oral DAILY PRN Danette Walsh MD      
 promethazine (PHENERGAN) tablet 12.5 mg  12.5 mg Oral Q6H PRN Danette Walsh MD      
 Or  
 ondansetron [facility]) injection 4 mg  4 mg IntraVENous Q6H PRN Danette Walsh MD      
 dextrose 10% infusion 125-250 mL  125-250 mL IntraVENous PRN Danette Walsh MD      
 
Current Outpatient Medications Medication Sig Dispense Refill  ferrous sulfate (Iron) 325 mg (65 mg iron) tablet Take 1 Tab by mouth daily (with breakfast).  30 Tab 0  
  levoFLOXacin (Levaquin) 750 mg tablet Take 1 Tab by mouth every other day for 4 days. 2 Tab 0  cephALEXin (Keflex) 500 mg capsule Take 1 Cap by mouth two (2) times a day. 60 Cap 0  
 mexiletine (MEXITIL) 150 mg capsule Take 1 Cap by mouth three (3) times daily. Indications: ventricular arrhythmias, a type of abnormal heart rhythm 90 Cap 0  carvediloL (COREG) 25 mg tablet Take 0.5 Tabs by mouth two (2) times daily (with meals). 30 Tab 2  
 bumetanide (BUMEX) 2 mg tablet Take 1 Tab by mouth daily. 30 Tab 2  
 magnesium oxide (MAG-OX) 400 mg tablet Take 1 Tab by mouth two (2) times a day. 60 Tab 2  
 ergocalciferol (ERGOCALCIFEROL) 1,250 mcg (50,000 unit) capsule Take 1 Cap by mouth every seven (7) days. 14 Cap 0  
 icosapent ethyL (VASCEPA) 1 gram capsule Take 1 Cap by mouth two (2) times daily (with meals). 60 Cap 1  
 amLODIPine (NORVASC) 5 mg tablet Take 5 mg by mouth daily.  tolterodine (DETROL) 2 mg tablet Take 8 mg by mouth two (2) times a day.  albuterol sulfate (PROVENTIL IN) Take 1 Puff by inhalation four (4) times daily as needed.  budesonide-formoteroL (Symbicort) 160-4.5 mcg/actuation HFAA Take 2 Puffs by inhalation ACB/HS.  hydrOXYzine HCL (ATARAX) 10 mg tablet Take 10 mg by mouth three (3) times daily as needed for Itching (hives).  warfarin (COUMADIN) 1 mg tablet Take 4 mg by mouth daily.  PARoxetine (PAXIL) 30 mg tablet Take 30 mg by mouth daily.  gabapentin (NEURONTIN) 300 mg capsule Take 1 Cap by mouth daily. Max Daily Amount: 300 mg. 30 Cap 2  
 insulin detemir U-100 (LEVEMIR FLEXTOUCH) 100 unit/mL (3 mL) inpn 40 units at bedtime 1 Adjustable Dose Pre-filled Pen Syringe 2  
 aspirin 81 mg tablet Take 81 mg by mouth daily. Review of Systems Constitutional: Positive for fatigue. Overall very poor mobility walks with a rolling walker Respiratory: Negative. Cardiovascular: Positive for leg swelling. Gastrointestinal: Negative. Genitourinary: Negative. Musculoskeletal: Positive for arthralgias and myalgias. Neurological: Negative. Psychiatric/Behavioral: Negative. Objective Vital signs for last 24 hours: 
Visit Vitals Pulse 90 Temp 99.8 °F (37.7 °C) Resp 22 SpO2 93% Intake/Output this shift: 
Current Shift: 11/10 0701 - 11/10 1900 In: 373.8 Out: - Last 3 Shifts: No intake/output data recorded. Data Review:  
Recent Results (from the past 24 hour(s)) EKG, 12 LEAD, INITIAL Collection Time: 11/09/20  2:55 PM  
Result Value Ref Range Ventricular Rate 134 BPM  
 Atrial Rate 300 BPM  
 QRS Duration 26 ms  
 Q-T Interval 180 ms QTC Calculation (Bezet) 268 ms Calculated R Axis 0 degrees Calculated T Axis -100 degrees Diagnosis Ventricular paced rhythm Indeterminate axis When compared with ECG of 04-NOV-2020 13:56, 
 
Vent. rate has increased BY   6 BPM 
Confirmed by Jaron Parks MD, Trena Adorno ([id number]) on 11/9/2020 4:46:00 PM 
  
CBC WITH AUTOMATED DIFF Collection Time: 11/09/20  6:19 PM  
Result Value Ref Range WBC 11.8 (H) 3.6 - 11.0 K/uL  
 RBC 2.68 (L) 3.80 - 5.20 M/uL HGB 6.9 (L) 11.5 - 16.0 g/dL HCT 23.1 (L) 35.0 - 47.0 % MCV 86.2 80.0 - 99.0 FL  
 MCH 25.7 (L) 26.0 - 34.0 PG  
 MCHC 29.9 (L) 30.0 - 36.5 g/dL  
 RDW 18.4 (H) 11.5 - 14.5 % PLATELET 871 409 - 944 K/uL MPV 10.8 8.9 - 12.9 FL  
 NRBC 0.0 0  WBC ABSOLUTE NRBC 0.00 0.00 - 0.01 K/uL NEUTROPHILS 82 (H) 32 - 75 % LYMPHOCYTES 10 (L) 12 - 49 % MONOCYTES 5 5 - 13 % EOSINOPHILS 1 0 - 7 % BASOPHILS 0 0 - 1 % IMMATURE GRANULOCYTES 2 (H) 0.0 - 0.5 % ABS. NEUTROPHILS 9.7 (H) 1.8 - 8.0 K/UL  
 ABS. LYMPHOCYTES 1.2 0.8 - 3.5 K/UL  
 ABS. MONOCYTES 0.6 0.0 - 1.0 K/UL  
 ABS. EOSINOPHILS 0.1 0.0 - 0.4 K/UL  
 ABS. BASOPHILS 0.0 0.0 - 0.1 K/UL  
 ABS. IMM.  GRANS. 0.2 (H) 0.00 - 0.04 K/UL  
 DF SMEAR SCANNED    
 RBC COMMENTS ANISOCYTOSIS 
1+ 
    
 METABOLIC PANEL, COMPREHENSIVE Collection Time: 11/09/20  6:19 PM  
Result Value Ref Range Sodium 136 136 - 145 mmol/L Potassium 6.0 (H) 3.5 - 5.1 mmol/L Chloride 100 97 - 108 mmol/L  
 CO2 25 21 - 32 mmol/L Anion gap 11 5 - 15 mmol/L Glucose 192 (H) 65 - 100 mg/dL BUN 90 (H) 6 - 20 MG/DL Creatinine 4.58 (H) 0.55 - 1.02 MG/DL  
 BUN/Creatinine ratio 20 12 - 20 GFR est AA 12 (L) >60 ml/min/1.73m2 GFR est non-AA 10 (L) >60 ml/min/1.73m2 Calcium 8.8 8.5 - 10.1 MG/DL Bilirubin, total 0.4 0.2 - 1.0 MG/DL  
 ALT (SGPT) 7 (L) 12 - 78 U/L  
 AST (SGOT) 17 15 - 37 U/L Alk. phosphatase 74 45 - 117 U/L Protein, total 7.2 6.4 - 8.2 g/dL Albumin 3.0 (L) 3.5 - 5.0 g/dL Globulin 4.2 (H) 2.0 - 4.0 g/dL A-G Ratio 0.7 (L) 1.1 - 2.2 SAMPLES BEING HELD Collection Time: 11/09/20  6:19 PM  
Result Value Ref Range SAMPLES BEING HELD 1RED COMMENT Add-on orders for these samples will be processed based on acceptable specimen integrity and analyte stability, which may vary by analyte. PTT Collection Time: 11/09/20  6:19 PM  
Result Value Ref Range aPTT 46.2 (H) 22.1 - 32.0 sec  
 aPTT, therapeutic range     58.0 - 77.0 SECS  
PROTHROMBIN TIME + INR Collection Time: 11/09/20  6:19 PM  
Result Value Ref Range INR 2.6 (H) 0.9 - 1.1 Prothrombin time 25.7 (H) 9.0 - 11.1 sec TYPE & SCREEN Collection Time: 11/09/20  6:46 PM  
Result Value Ref Range Crossmatch Expiration 11/12/2020,2359 ABO/Rh(D) O NEGATIVE Antibody screen POS Antibody ID ANTI-JK(B) ANTIGEN TYPING Jk(B) NEGATIVE, Unit number [id number] Blood component type RC LR,2 Unit division 00 Status of unit ISSUED ANTIGEN/ANTIBODY INFO Jk(B) NEGATIVE, Crossmatch result Compatible Unit number [id number] Blood component type RC LR,2 Unit division 00 Status of unit ALLOCATED ANTIGEN/ANTIBODY INFO Jk(B) NEGATIVE, Crossmatch result Compatible RBC, ALLOCATE Collection Time: 11/09/20  7:00 PM  
Result Value Ref Range HISTORY CHECKED? Historical check performed GLUCOSE, POC Collection Time: 11/09/20 10:44 PM  
Result Value Ref Range Glucose (POC) 163 (H) 65 - 100 mg/dL Performed by [employer] Haley GLUCOSE, POC Collection Time: 11/10/20 12:42 AM  
Result Value Ref Range Glucose (POC) 163 (H) 65 - 100 mg/dL Performed by [employer] Haley GLUCOSE, POC Collection Time: 11/10/20  1:48 AM  
Result Value Ref Range Glucose (POC) 170 (H) 65 - 100 mg/dL Performed by [employer] Haley GLUCOSE, POC Collection Time: 11/10/20  3:13 AM  
Result Value Ref Range Glucose (POC) 184 (H) 65 - 100 mg/dL Performed by [employer] Haley GLUCOSE, POC Collection Time: 11/10/20  5:06 AM  
Result Value Ref Range Glucose (POC) 215 (H) 65 - 100 mg/dL Performed by [employer]aley CBC WITH AUTOMATED DIFF Collection Time: 11/10/20  5:11 AM  
Result Value Ref Range WBC 11.4 (H) 3.6 - 11.0 K/uL  
 RBC 2.44 (L) 3.80 - 5.20 M/uL HGB 6.3 (L) 11.5 - 16.0 g/dL HCT 21.0 (L) 35.0 - 47.0 % MCV 86.1 80.0 - 99.0 FL  
 MCH 25.8 (L) 26.0 - 34.0 PG  
 MCHC 30.0 30.0 - 36.5 g/dL  
 RDW 18.4 (H) 11.5 - 14.5 % PLATELET 843 533 - 664 K/uL MPV 11.3 8.9 - 12.9 FL  
 NRBC 0.0 0  WBC ABSOLUTE NRBC 0.00 0.00 - 0.01 K/uL NEUTROPHILS 78 (H) 32 - 75 % LYMPHOCYTES 11 (L) 12 - 49 % MONOCYTES 8 5 - 13 % EOSINOPHILS 2 0 - 7 % BASOPHILS 0 0 - 1 % IMMATURE GRANULOCYTES 1 (H) 0.0 - 0.5 % ABS. NEUTROPHILS 8.9 (H) 1.8 - 8.0 K/UL  
 ABS. LYMPHOCYTES 1.3 0.8 - 3.5 K/UL  
 ABS. MONOCYTES 0.9 0.0 - 1.0 K/UL  
 ABS. EOSINOPHILS 0.2 0.0 - 0.4 K/UL  
 ABS. BASOPHILS 0.0 0.0 - 0.1 K/UL  
 ABS. IMM. GRANS. 0.1 (H) 0.00 - 0.04 K/UL  
 DF SMEAR SCANNED    
 RBC COMMENTS HYPOCHROMIA 1+ 
    
 RBC COMMENTS MICROCYTOSIS 1+ 
    
 RBC COMMENTS ANISOCYTOSIS 1+ 
    
 RBC COMMENTS SCHISTOCYTES 1+ 
    
 RBC COMMENTS OVALOCYTES 1+ RBC COMMENTS POLYCHROMASIA PRESENT 
    
 RBC COMMENTS TEARDROP CELLS 
PRESENT 
    
METABOLIC PANEL, COMPREHENSIVE Collection Time: 11/10/20  5:11 AM  
Result Value Ref Range Sodium 138 136 - 145 mmol/L Potassium 5.2 (H) 3.5 - 5.1 mmol/L Chloride 101 97 - 108 mmol/L  
 CO2 28 21 - 32 mmol/L Anion gap 9 5 - 15 mmol/L Glucose 170 (H) 65 - 100 mg/dL BUN 92 (H) 6 - 20 MG/DL Creatinine 4.96 (H) 0.55 - 1.02 MG/DL  
 BUN/Creatinine ratio 19 12 - 20 GFR est AA 11 (L) >60 ml/min/1.73m2 GFR est non-AA 9 (L) >60 ml/min/1.73m2 Calcium 9.0 8.5 - 10.1 MG/DL Bilirubin, total 0.3 0.2 - 1.0 MG/DL  
 ALT (SGPT) 8 (L) 12 - 78 U/L  
 AST (SGOT) 15 15 - 37 U/L Alk. phosphatase 69 45 - 117 U/L Protein, total 6.7 6.4 - 8.2 g/dL Albumin 2.9 (L) 3.5 - 5.0 g/dL Globulin 3.8 2.0 - 4.0 g/dL A-G Ratio 0.8 (L) 1.1 - 2.2 PROTHROMBIN TIME + INR Collection Time: 11/10/20  5:11 AM  
Result Value Ref Range INR 2.4 (H) 0.9 - 1.1 Prothrombin time 24.5 (H) 9.0 - 11.1 sec GLUCOSE, POC Collection Time: 11/10/20  8:57 AM  
Result Value Ref Range Glucose (POC) 189 (H) 65 - 100 mg/dL Performed by [facility] Physical Exam 
Vitals signs and nursing note reviewed. Constitutional:   
   Appearance: She is obese. Comments: Patient appears chronically ill and in overall poor functional status HENT:  
   Head: Normocephalic. Cardiovascular:  
   Rate and Rhythm: Normal rate. Pulmonary:  
   Effort: Pulmonary effort is normal.  
Abdominal:  
   General: Abdomen is flat. Palpations: Abdomen is soft. Comments: Obese, soft Musculoskeletal:  
   Comments: Chronic tonic jerks Skin: 
   General: Skin is warm. Neurological:  
   General: No focal deficit present. Mental Status: She is oriented to person, place, and time.   
Psychiatric:     
   Mood and Affect: Mood normal.     
 Behavior: Behavior normal.     
   Thought Content:  Thought content normal.     
   Judgment: Judgment normal.

## 2020-11-10 NOTE — CONSULTS
[address] [facility], [address] [facility] Phone: [phone]   [phone] NEPHROLOGY CONSULT NOTE Patient: King Ginette MRN: [id number]  PCP: Camila Sanchez NP  
:     1950  Age:   78 y.o. Sex:  female Referring physician: Rowdy Polo DO Reason for consultation: 78 y.o. female with Anemia [D64.9] ROCIO (acute kidney injury) ([facility] [address] [N17.9] Admission Date: 2020  4:14 PM  LOS: 1 day ASSESSMENT and PLAN :  
ROCIO on CKD 
-Etiology to be determined. -Differential diagnosis includes 1. Hypovolemia secondary to diuretics, blood loss 2. Sepsis from infection. Need to exclude bacteremia. 3.  Acute interstitial nephritis from antibiotics versus postinfectious GN. -Given her body habitus, it is difficult to assess her volume status. After discussion with advanced heart failure team we concluded that she is probably intravascularly dry. -CT abdomen and pelvis without any obstruction or pyelonephritis. -Urine analysis shows proteinuria and hematuria without any bacteriuria. 
-She appears euvolemic and also appears to have myoclonic jerks possibly from uremia. 
-No emergent need for dialysis but if she fails to improve with IV fluids overnight she would be requiring emergent dialysis. -Start NS at 75 cc an hour. Hold Bumex. 
-Avoid all potential nephrotoxic medications. CKD stage III. -Baseline creatinine prior to last admission was 1.3 mg/dL 
-Creatinine close to 2.0-2.1 when she was euvolemic. 
-Etiology of CKD is likely diabetes hypertension and cardiomyopathy. However she does have a positive PYP testing which raises the possibility of amyloidosis. Chronic systolic CHF, stage C NYHA class IV 
-Combined ischemic and nonischemic cardiomyopathy 
-S/p HM 2 LVAD implant 4/15/2017 by Dr. Klaus Rock at [facility] 
-Recent acute on chronic RV failure. -Recent sternal wound infection with staph aureus and Morganella.   Has LVAD site drainage. Severe COPD. Pulmonary hypertension. Chronic A. fib. History of endometrial answer ATTR amyloidosis Care Plan discussed with: Advanced heart failure team     
 
Thank you for consulting [facility] in the care of your patient. Subjective: HPI: Endy Ash is a 78 y.o.  female with  has a past medical history of Asthma, Cancer ([address] Cancer ([facility] [address] Congestive heart failure, unspecified, CRI (chronic renal insufficiency), Depression, Diabetes ([address] and Hypertension. She has a history of HeartMate 2 left ventricular assist device implantation for chronic systolic congestive heart failure that was secondary to combined ischemic and nonischemic cardiomyopathy. Her LVAD was implanted at [facility] in April 2017 and she was followed at the heart failure clinic at [facility] until it was closed recently. Subsequently she transferred her care to [facility] heart failure center. She presented to ER, brought in by her daughter after she had a fall and extensive bleeding from her right calf wound. On admission she was found to be in severe degree acute renal failure for which nephrology has been consulted for evaluation and management. She has a very long and complicated medical history. Patient was not able to provide any medical history during my evaluation. Henceforth I have talked to advanced heart failure clinic and obtained her information pertaining to her LVAD as well as her recent admission. She was discharged 2 days prior to this readmission. Last admission she was treated for severe degree RV failure with severe bilateral lower extremity edema where she had aggressive diuresis with significant improvement in her volume overload. Her creatinine which was 1.3 mg/dL on admission was rising at the time of discharge.   Her creatinine for 4 days prior to the discharge was 2.0, 2.1, 2.3 and 2.5 mg/dL. She was taken off her spironolactone as well as Entresto on discharge due to hyperkalemia and renal dysfunction. She was discharged on Bumex 2 mg daily. She was treated for sternal wound infection as well as lower extremity cellulitis and was discharged on Levaquin. She also had ventricular tachycardia for which she was placed on mexiletine. She has been on Coumadin for LVAD. Upon discharge from the hospital she sustained a fall and developed a deep laceration on her calf from which her she has suffered extensive bleeding. Her hemoglobin which was 9.3 g prior to the discharge was down to 6.9 on admission. She has received a unit of blood transfusion so far. Past Medical Hx:  
Past Medical History:  
Diagnosis Date  Asthma  Cancer ([facility] [address] breast  
 Cancer ([facility] [address]   
 endometrial  
 Congestive heart failure, unspecified  CRI (chronic renal insufficiency)  Depression  Diabetes ([facility] [address]  Hypertension Past Surgical Hx: 
  
Past Surgical History:  
Procedure Laterality Date  HX HERNIA REPAIR    
 HX HYSTERECTOMY  HX MASTECTOMY No Known Allergies Social Hx:  reports that she has never smoked. She has never used smokeless tobacco. She reports previous alcohol use. History reviewed. No pertinent family history. Review of Systems: A thorough twelve point review of system was performed today. Pertinent positives and negatives are mentioned in the HPI. The reminder of the ROS is negative and noncontributory. Objective:   
Vitals:   
Vitals:  
 11/10/20 0900 11/10/20 1000 11/10/20 1100 11/10/20 1325 Pulse: 90 84 76 75 Resp: 22 23 25 20 Temp: 99.8 °F (37.7 °C)   99.6 °F (37.6 °C) SpO2: 93% 92% 95% 95% I&O cancer:  No intake/output data recorded. Visit Vitals Pulse 75 Temp 99.6 °F (37.6 °C) Resp 20 SpO2 95% Physical Exam: 
General: Morbidly obese, lethargic HEENT: PERRL,  No Pallor , No Icterus Neck: Supple,no mass palpable Lungs : CTA 
CVS: RRR, VAD sounds Abdomen: Soft, NT, BS + Extremities: Bilateral lower extremity edema 1+, Ace wraps around left lower extremity. Skin: No rash or lesions. MS: No joint swelling, erythema, warmth Neurologic: Confused, myoclonic jerks Psych: Unable to assess Laboratory Results: 
 
Recent Labs 11/10/20 
7293 11/10/20 
0511 11/09/20 
1819 11/09/20 
0115  138 136 138  
K 5.0 5.2* 6.0* 5.6*  
 101 100 102 CO2 28 28 25 30 * 170* 192* 164* BUN 94* 92* 90* 79* CREA 4.89* 4.96* 4.58* 3.46* CA 8.6 9.0 8.8 8.8 ALB  --  2.9* 3.0* 3.2* ALT  --  8* 7* 8* INR  --  2.4* 2.6* 2.4* Recent Labs 11/10/20 
663 032 403 11/10/20 
0511 11/09/20 
1819 WBC 10.1 11.4* 11.8* HGB 7.0* 6.3* 6.9*  
HCT 22.7* 21.0* 23.1*  
* 157 202 No results found for: SDES Lab Results Component Value Date/Time Culture result: No Growth (<1000 cfu/mL) 11/09/2020 12:05 AM  
 Culture result: SCANT Morganella morganii ssp morganii (A) 10/30/2020 11:30 AM  
 Culture result: SCANT Morganella morganii ssp morganii (A) 10/30/2020 11:30 AM  
 Culture result: RARE DIPHTHEROIDS (A) 10/30/2020 11:30 AM  
 
Recent Results (from the past 24 hour(s)) CBC WITH AUTOMATED DIFF Collection Time: 11/09/20  6:19 PM  
Result Value Ref Range WBC 11.8 (H) 3.6 - 11.0 K/uL  
 RBC 2.68 (L) 3.80 - 5.20 M/uL HGB 6.9 (L) 11.5 - 16.0 g/dL HCT 23.1 (L) 35.0 - 47.0 % MCV 86.2 80.0 - 99.0 FL  
 MCH 25.7 (L) 26.0 - 34.0 PG  
 MCHC 29.9 (L) 30.0 - 36.5 g/dL  
 RDW 18.4 (H) 11.5 - 14.5 % PLATELET 113 334 - 304 K/uL MPV 10.8 8.9 - 12.9 FL  
 NRBC 0.0 0  WBC ABSOLUTE NRBC 0.00 0.00 - 0.01 K/uL NEUTROPHILS 82 (H) 32 - 75 % LYMPHOCYTES 10 (L) 12 - 49 % MONOCYTES 5 5 - 13 % EOSINOPHILS 1 0 - 7 % BASOPHILS 0 0 - 1 % IMMATURE GRANULOCYTES 2 (H) 0.0 - 0.5 % ABS. NEUTROPHILS 9.7 (H) 1.8 - 8.0 K/UL  
 ABS. LYMPHOCYTES 1.2 0.8 - 3.5 K/UL ABS. MONOCYTES 0.6 0.0 - 1.0 K/UL  
 ABS. EOSINOPHILS 0.1 0.0 - 0.4 K/UL  
 ABS. BASOPHILS 0.0 0.0 - 0.1 K/UL  
 ABS. IMM. GRANS. 0.2 (H) 0.00 - 0.04 K/UL  
 DF SMEAR SCANNED    
 RBC COMMENTS ANISOCYTOSIS 
1+ METABOLIC PANEL, COMPREHENSIVE Collection Time: 11/09/20  6:19 PM  
Result Value Ref Range Sodium 136 136 - 145 mmol/L Potassium 6.0 (H) 3.5 - 5.1 mmol/L Chloride 100 97 - 108 mmol/L  
 CO2 25 21 - 32 mmol/L Anion gap 11 5 - 15 mmol/L Glucose 192 (H) 65 - 100 mg/dL BUN 90 (H) 6 - 20 MG/DL Creatinine 4.58 (H) 0.55 - 1.02 MG/DL  
 BUN/Creatinine ratio 20 12 - 20 GFR est AA 12 (L) >60 ml/min/1.73m2 GFR est non-AA 10 (L) >60 ml/min/1.73m2 Calcium 8.8 8.5 - 10.1 MG/DL Bilirubin, total 0.4 0.2 - 1.0 MG/DL  
 ALT (SGPT) 7 (L) 12 - 78 U/L  
 AST (SGOT) 17 15 - 37 U/L Alk. phosphatase 74 45 - 117 U/L Protein, total 7.2 6.4 - 8.2 g/dL Albumin 3.0 (L) 3.5 - 5.0 g/dL Globulin 4.2 (H) 2.0 - 4.0 g/dL A-G Ratio 0.7 (L) 1.1 - 2.2 SAMPLES BEING HELD Collection Time: 11/09/20  6:19 PM  
Result Value Ref Range SAMPLES BEING HELD 1RED COMMENT Add-on orders for these samples will be processed based on acceptable specimen integrity and analyte stability, which may vary by analyte. PTT Collection Time: 11/09/20  6:19 PM  
Result Value Ref Range aPTT 46.2 (H) 22.1 - 32.0 sec  
 aPTT, therapeutic range     58.0 - 77.0 SECS  
PROTHROMBIN TIME + INR Collection Time: 11/09/20  6:19 PM  
Result Value Ref Range INR 2.6 (H) 0.9 - 1.1 Prothrombin time 25.7 (H) 9.0 - 11.1 sec TYPE & SCREEN Collection Time: 11/09/20  6:46 PM  
Result Value Ref Range Crossmatch Expiration 11/12/2020,2359 ABO/Rh(D) O NEGATIVE Antibody screen POS Antibody ID ANTI-JK(B) ANTIGEN TYPING Jk(B) NEGATIVE, Unit number [id number] Blood component type RC LR,2 Unit division 00  Status of unit ISSUED   
 ANTIGEN/ANTIBODY INFO Jk(B) NEGATIVE, Crossmatch result Compatible Unit number [id number] Blood component type RC LR,2 Unit division 00 Status of unit ALLOCATED ANTIGEN/ANTIBODY INFO TITOk(B) NEGATIVE, Crossmatch result Compatible RBC, ALLOCATE Collection Time: 11/09/20  7:00 PM  
Result Value Ref Range HISTORY CHECKED? Historical check performed GLUCOSE, POC Collection Time: 11/09/20 10:44 PM  
Result Value Ref Range Glucose (POC) 163 (H) 65 - 100 mg/dL Performed by Real [employer] GLUCOSE, POC Collection Time: 11/10/20 12:42 AM  
Result Value Ref Range Glucose (POC) 163 (H) 65 - 100 mg/dL Performed by [email or web address] GLUCOSE, POC Collection Time: 11/10/20  1:48 AM  
Result Value Ref Range Glucose (POC) 170 (H) 65 - 100 mg/dL Performed by [employer] GLUCOSE, POC Collection Time: 11/10/20  3:13 AM  
Result Value Ref Range Glucose (POC) 184 (H) 65 - 100 mg/dL Performed by [email or web address] GLUCOSE, POC Collection Time: 11/10/20  5:06 AM  
Result Value Ref Range Glucose (POC) 215 (H) 65 - 100 mg/dL Performed by [email or web address] CBC WITH AUTOMATED DIFF Collection Time: 11/10/20  5:11 AM  
Result Value Ref Range WBC 11.4 (H) 3.6 - 11.0 K/uL  
 RBC 2.44 (L) 3.80 - 5.20 M/uL HGB 6.3 (L) 11.5 - 16.0 g/dL HCT 21.0 (L) 35.0 - 47.0 % MCV 86.1 80.0 - 99.0 FL  
 MCH 25.8 (L) 26.0 - 34.0 PG  
 MCHC 30.0 30.0 - 36.5 g/dL  
 RDW 18.4 (H) 11.5 - 14.5 % PLATELET 040 652 - 462 K/uL MPV 11.3 8.9 - 12.9 FL  
 NRBC 0.0 0  WBC ABSOLUTE NRBC 0.00 0.00 - 0.01 K/uL NEUTROPHILS 78 (H) 32 - 75 % LYMPHOCYTES 11 (L) 12 - 49 % MONOCYTES 8 5 - 13 % EOSINOPHILS 2 0 - 7 % BASOPHILS 0 0 - 1 % IMMATURE GRANULOCYTES 1 (H) 0.0 - 0.5 % ABS. NEUTROPHILS 8.9 (H) 1.8 - 8.0 K/UL  
 ABS. LYMPHOCYTES 1.3 0.8 - 3.5 K/UL  
 ABS. MONOCYTES 0.9 0.0 - 1.0 K/UL  
 ABS. EOSINOPHILS 0.2 0.0 - 0.4 K/UL ABS. BASOPHILS 0.0 0.0 - 0.1 K/UL  
 ABS. IMM. GRANS. 0.1 (H) 0.00 - 0.04 K/UL  
 DF SMEAR SCANNED    
 RBC COMMENTS HYPOCHROMIA 1+ 
    
 RBC COMMENTS MICROCYTOSIS 1+ 
    
 RBC COMMENTS ANISOCYTOSIS 1+ 
    
 RBC COMMENTS SCHISTOCYTES 1+ RBC COMMENTS OVALOCYTES 1+ RBC COMMENTS POLYCHROMASIA PRESENT 
    
 RBC COMMENTS TEARDROP CELLS 
PRESENT 
    
METABOLIC PANEL, COMPREHENSIVE Collection Time: 11/10/20  5:11 AM  
Result Value Ref Range Sodium 138 136 - 145 mmol/L Potassium 5.2 (H) 3.5 - 5.1 mmol/L Chloride 101 97 - 108 mmol/L  
 CO2 28 21 - 32 mmol/L Anion gap 9 5 - 15 mmol/L Glucose 170 (H) 65 - 100 mg/dL BUN 92 (H) 6 - 20 MG/DL Creatinine 4.96 (H) 0.55 - 1.02 MG/DL  
 BUN/Creatinine ratio 19 12 - 20 GFR est AA 11 (L) >60 ml/min/1.73m2 GFR est non-AA 9 (L) >60 ml/min/1.73m2 Calcium 9.0 8.5 - 10.1 MG/DL Bilirubin, total 0.3 0.2 - 1.0 MG/DL  
 ALT (SGPT) 8 (L) 12 - 78 U/L  
 AST (SGOT) 15 15 - 37 U/L Alk. phosphatase 69 45 - 117 U/L Protein, total 6.7 6.4 - 8.2 g/dL Albumin 2.9 (L) 3.5 - 5.0 g/dL Globulin 3.8 2.0 - 4.0 g/dL A-G Ratio 0.8 (L) 1.1 - 2.2 PROTHROMBIN TIME + INR Collection Time: 11/10/20  5:11 AM  
Result Value Ref Range INR 2.4 (H) 0.9 - 1.1 Prothrombin time 24.5 (H) 9.0 - 11.1 sec GLUCOSE, POC Collection Time: 11/10/20  8:57 AM  
Result Value Ref Range Glucose (POC) 189 (H) 65 - 100 mg/dL Performed by Nirav Richard ACID Collection Time: 11/10/20  9:47 AM  
Result Value Ref Range Lactic acid 0.6 0.4 - 2.0 MMOL/L  
PROCALCITONIN Collection Time: 11/10/20  9:47 AM  
Result Value Ref Range Procalcitonin 0.95 ng/mL SAMPLES BEING HELD Collection Time: 11/10/20  9:47 AM  
Result Value Ref Range SAMPLES BEING HELD 1PST,1LAV   
 COMMENT   Add-on orders for these samples will be processed based on acceptable specimen integrity and analyte stability, which may vary by analyte. METABOLIC PANEL, BASIC Collection Time: 11/10/20  9:47 AM  
Result Value Ref Range Sodium 137 136 - 145 mmol/L Potassium 5.0 3.5 - 5.1 mmol/L Chloride 102 97 - 108 mmol/L  
 CO2 28 21 - 32 mmol/L Anion gap 7 5 - 15 mmol/L Glucose 174 (H) 65 - 100 mg/dL BUN 94 (H) 6 - 20 MG/DL Creatinine 4.89 (H) 0.55 - 1.02 MG/DL  
 BUN/Creatinine ratio 19 12 - 20 GFR est AA 11 (L) >60 ml/min/1.73m2 GFR est non-AA 9 (L) >60 ml/min/1.73m2 Calcium 8.6 8.5 - 10.1 MG/DL  
CBC WITH AUTOMATED DIFF Collection Time: 11/10/20  9:47 AM  
Result Value Ref Range WBC 10.1 3.6 - 11.0 K/uL  
 RBC 2.64 (L) 3.80 - 5.20 M/uL HGB 7.0 (L) 11.5 - 16.0 g/dL HCT 22.7 (L) 35.0 - 47.0 % MCV 86.0 80.0 - 99.0 FL  
 MCH 26.5 26.0 - 34.0 PG  
 MCHC 30.8 30.0 - 36.5 g/dL  
 RDW 17.9 (H) 11.5 - 14.5 % PLATELET 941 (L) 148 - 400 K/uL MPV 11.1 8.9 - 12.9 FL  
 NRBC 0.0 0  WBC ABSOLUTE NRBC 0.00 0.00 - 0.01 K/uL NEUTROPHILS 79 (H) 32 - 75 % LYMPHOCYTES 11 (L) 12 - 49 % MONOCYTES 7 5 - 13 % EOSINOPHILS 2 0 - 7 % BASOPHILS 0 0 - 1 % IMMATURE GRANULOCYTES 1 (H) 0.0 - 0.5 % ABS. NEUTROPHILS 8.0 1.8 - 8.0 K/UL  
 ABS. LYMPHOCYTES 1.1 0.8 - 3.5 K/UL  
 ABS. MONOCYTES 0.7 0.0 - 1.0 K/UL  
 ABS. EOSINOPHILS 0.2 0.0 - 0.4 K/UL  
 ABS. BASOPHILS 0.0 0.0 - 0.1 K/UL  
 ABS. IMM. GRANS. 0.1 (H) 0.00 - 0.04 K/UL  
 DF AUTOMATED    
GLUCOSE, POC Collection Time: 11/10/20  1:50 PM  
Result Value Ref Range Glucose (POC) 192 (H) 65 - 100 mg/dL Performed by Huyen Stevenson Urine dipstick:  
Lab Results Component Value Date/Time  Color Yellow/Straw 11/09/2020 12:05 AM  
 Appearance Turbid (A) 11/09/2020 12:05 AM  
 Specific gravity 1.013 11/09/2020 12:05 AM  
 pH (UA) 5.0 11/09/2020 12:05 AM  
 Protein 100 (A) 11/09/2020 12:05 AM  
 Glucose Negative 11/09/2020 12:05 AM  
 Ketone Negative 11/09/2020 12:05 AM  
 Bilirubin Negative 11/09/2020 12:05 AM  
 Urobilinogen 0.1 11/09/2020 12:05 AM  
 Nitrites Negative 11/09/2020 12:05 AM  
 Leukocyte Esterase Trace (A) 11/09/2020 12:05 AM  
 Bacteria Negative 11/09/2020 12:05 AM  
 WBC 0-5 11/09/2020 12:05 AM  
 RBC 0-5 11/09/2020 12:05 AM  
 
 
I have reviewed the following: All pertinent labs, microbiology data, radiology imaging for my assessment Medications list Personally Reviewed   [x]      Yes     []               No    
 
Medications: 
Prior to Admission medications Medication Sig Start Date End Date Taking? Authorizing Provider  
insulin detemir U-100 (Levemir U-100 Insulin) 100 unit/mL injection 25 Units by SubCUTAneous route nightly. Yes Provider, Historical  
ferrous sulfate (Iron) 325 mg (65 mg iron) tablet Take 1 Tab by mouth daily (with breakfast). 11/8/20  Yes Meche Ventura, DO  
levoFLOXacin (Levaquin) 750 mg tablet Take 1 Tab by mouth every other day for 4 days. 11/8/20 11/12/20 Yes Meche Ventura, DO  
cephALEXin (Keflex) 500 mg capsule Take 1 Cap by mouth two (2) times a day. 11/7/20  Yes Meche Ventura, DO  
mexiletine (MEXITIL) 150 mg capsule Take 1 Cap by mouth three (3) times daily. Indications: ventricular arrhythmias, a type of abnormal heart rhythm 11/7/20  Yes CHARLENE Ventura,   
carvediloL (COREG) 25 mg tablet Take 0.5 Tabs by mouth two (2) times daily (with meals). 11/5/20  Yes Flavio Rouse NP  
bumetanide (BUMEX) 2 mg tablet Take 1 Tab by mouth daily. 11/6/20  Yes Flavio Rouse NP  
magnesium oxide (MAG-OX) 400 mg tablet Take 1 Tab by mouth two (2) times a day. 11/5/20  Yes Flavio Rouse NP  
ergocalciferol (ERGOCALCIFEROL) 1,250 mcg (50,000 unit) capsule Take 1 Cap by mouth every seven (7) days. 10/5/20  Yes Braxton, Franchesca Searing, NP  
icosapent ethyL (VASCEPA) 1 gram capsule Take 1 Cap by mouth two (2) times daily (with meals).  10/5/20  Yes Yale Cooks, NP  
 amLODIPine (NORVASC) 5 mg tablet Take 2.5 mg by mouth two (2) times a day. Yes Provider, Historical  
tolterodine (DETROL) 2 mg tablet Take 8 mg by mouth two (2) times a day. Yes Provider, Historical  
albuterol sulfate (PROVENTIL IN) Take 1 Puff by inhalation four (4) times daily as needed. Yes Provider, Historical  
budesonide-formoteroL (Symbicort) 160-4.5 mcg/actuation HFAA Take 2 Puffs by inhalation two (2) times daily as needed. Yes Provider, Historical  
hydrOXYzine HCL (ATARAX) 10 mg tablet Take 10 mg by mouth three (3) times daily as needed for Itching (hives). Yes Provider, Historical  
warfarin (COUMADIN) 1 mg tablet Take 4 mg by mouth daily. Yes Provider, Historical  
PARoxetine (PAXIL) 30 mg tablet Take 30 mg by mouth daily. Yes Provider, Historical  
gabapentin (NEURONTIN) 300 mg capsule Take 1 Cap by mouth daily. Max Daily Amount: 300 mg. 10/1/20  Yes Washington Boast, NP  
aspirin 81 mg tablet Take 81 mg by mouth daily. Yes Provider, Historical  
  
 
Thank you for allowing us to participate in the care of this patient. We will follow patient. Please dont hesitate to call with any questions Madai Briones S [facility] [id number] [place] [address], [address] [facility] Phone - [phone] Fax - [phone] 
[email or web address] [email or web address]

## 2020-11-11 NOTE — PROGRESS NOTES
Admit Date: 2020 POD * No surgery found * Procedure:  * No surgery found * Subjective:  
 
Patient feeling much better, more animated, no significant leg pain Review of Systems Pain much improved Objective:  
 
Pulse 75, temperature 98.4 °F (36.9 °C), resp. rate 22, weight 297 lb 9.9 oz (135 kg), SpO2 96 %. Temp (24hrs), Av °F (37.2 °C), Min:98.4 °F (36.9 °C), Max:99.7 °F (37.6 °C) Physical Exam 
Left calf wound healthy fatty tissue with minimal partially viable skin at the periphery not loose any further, no signs of cellulitis. Chronic lower extremity bilateral venous stasis hyperpigmentation Patient alert awake and oriented Labs:  
Recent Results (from the past 24 hour(s)) GLUCOSE, POC Collection Time: 11/10/20  1:50 PM  
Result Value Ref Range Glucose (POC) 192 (H) 65 - 100 mg/dL Performed by Janusz COLEMAN, [employer] Collection Time: 11/10/20  5:15 PM  
Result Value Ref Range HISTORY CHECKED? Historical check performed CULTURE, BLOOD, PAIRED Collection Time: 11/10/20  6:42 PM  
 Specimen: Blood Result Value Ref Range Special Requests: NO SPECIAL REQUESTS Culture result: NO GROWTH AFTER 9 HOURS    
GLUCOSE, POC Collection Time: 11/10/20 10:21 PM  
Result Value Ref Range Glucose (POC) 203 (H) 65 - 100 mg/dL Performed by Dov HAJI PCT   
PHOSPHORUS Collection Time: 20  5:49 AM  
Result Value Ref Range Phosphorus 5.2 (H) 2.6 - 4.7 MG/DL  
METABOLIC PANEL, COMPREHENSIVE Collection Time: 20  5:49 AM  
Result Value Ref Range Sodium 138 136 - 145 mmol/L Potassium 4.6 3.5 - 5.1 mmol/L Chloride 103 97 - 108 mmol/L  
 CO2 27 21 - 32 mmol/L Anion gap 8 5 - 15 mmol/L Glucose 171 (H) 65 - 100 mg/dL BUN 98 (H) 6 - 20 MG/DL Creatinine 4.68 (H) 0.55 - 1.02 MG/DL  
 BUN/Creatinine ratio 21 (H) 12 - 20 GFR est AA 11 (L) >60 ml/min/1.73m2 GFR est non-AA 9 (L) >60 ml/min/1.73m2 Calcium 8.4 (L) 8.5 - 10.1 MG/DL Bilirubin, total 0.4 0.2 - 1.0 MG/DL  
 ALT (SGPT) 11 (L) 12 - 78 U/L  
 AST (SGOT) 56 (H) 15 - 37 U/L Alk. phosphatase 67 45 - 117 U/L Protein, total 6.7 6.4 - 8.2 g/dL Albumin 2.8 (L) 3.5 - 5.0 g/dL Globulin 3.9 2.0 - 4.0 g/dL A-G Ratio 0.7 (L) 1.1 - 2.2 LACTIC ACID Collection Time: 11/11/20  5:49 AM  
Result Value Ref Range Lactic acid 0.7 0.4 - 2.0 MMOL/L  
PROTHROMBIN TIME + INR Collection Time: 11/11/20  5:49 AM  
Result Value Ref Range INR 2.3 (H) 0.9 - 1.1 Prothrombin time 23.6 (H) 9.0 - 11.1 sec CBC W/O DIFF Collection Time: 11/11/20  5:50 AM  
Result Value Ref Range WBC 9.6 3.6 - 11.0 K/uL  
 RBC 2.68 (L) 3.80 - 5.20 M/uL HGB 7.4 (L) 11.5 - 16.0 g/dL HCT 23.9 (L) 35.0 - 47.0 % MCV 89.2 80.0 - 99.0 FL  
 MCH 27.6 26.0 - 34.0 PG  
 MCHC 31.0 30.0 - 36.5 g/dL  
 RDW 17.6 (H) 11.5 - 14.5 % PLATELET 393 (L) 018 - 400 K/uL MPV 11.1 8.9 - 12.9 FL  
 NRBC 0.0 0  WBC ABSOLUTE NRBC 0.00 0.00 - 0.01 K/uL GLUCOSE, POC Collection Time: 11/11/20  6:35 AM  
Result Value Ref Range Glucose (POC) 182 (H) 65 - 100 mg/dL Performed by Marina Malagon   
RBC, ALLOCATE Collection Time: 11/11/20 11:00 AM  
Result Value Ref Range HISTORY CHECKED? Historical check performed Data Review images and reports reviewed Assessment:  
 
Active Problems: 
  Obesity, morbid ([address] (10/1/2020) Acute on chronic systolic CHF (congestive heart failure) ([facility] [address] (10/27/2020) NICM (nonischemic cardiomyopathy) ([facility] [address] (11/4/2020) Chronic venous insufficiency (11/4/2020) Anemia (11/9/2020) ROCIO (acute kidney injury) ([facility] [address] (11/9/2020) Coagulopathy ([facility] [address] (11/10/2020) Open wound of left lower leg (11/10/2020) Plan/Recommendations/Medical Decision Making:  
 
Continue present treatment Reviewed with wound care, collagen dressing, dry gauze change every several days, we are available as needed we will have follow-up surgically next week or sooner as needed. This wound will take quite a while to heal and expect some epidermal lysis of the peripheral skin Lauren Fish MD , [facility]

## 2020-11-11 NOTE — CONSULTS
Infectious Disease Consult Today's Date: 2020 Admit Date: 2020 Impression: Metabolic encephalopathy Sternal wound, lower extremity infection Venous stasis 
- normal WBC, afebrile. Urine cx (-) Blood cx (11/10) pending CXR (-) 
  CT of ABD/PEL: Minimal subcutaneous fat changes right lateral abdominal wall. This may 
represent benign dependent changes if the patient lays on her right. A mild 
contusion could also give this appearance Debridement done at bedside on 11/10 by Dr. Maximiliano King, may need further surgical debridement later LVAD/ICD Acute on chronic systolic heart failure - cardiology following, s/p cardiac cath 11/3 
  EF 15-20% Type II DM with neuropathy - A1C 8.4. continue with insulin therapy 
  Continue with Neurontin ROCIO on CKD 
- creat 4.6 Avoid nephrotoxic agents, nephrology following Plan:  
Continue with IV cefepime Fever work up if temp >= 100.4 Head CT w/o contrast 
Left lower extremity wound; wound care was done by the wound care team by the time I saw the pt. Will re asses the wound with wound care team, will get wound cx if appropriate Anti-infectives: · IV cefepime 10/29 to , resumed 11/10 to present · Levo 750mg every other day; pt took one dose since the discharge Subjective:  
Date of Consultation:  2020 Referring Physician: Ms. Carlus Paget, NP Patient is a 78 y.o. female was admitted to the hospital on  with AMS and injured left lower extremity after the fall. The incident occurred while the pt was alone at home for 2 hours while pt's daughter was taking care of her  baby. According to the daughter, pt felt unsteady on her feet, nauseated, shaky after took levaquin and mexiletine.  Since the fall, pt also presenting involuntary muscular movements at rest. 
 
Pt was recently hospitalized between 10/27- after being treated for with the worsening of right lower extremity cellulitis and sternal wound infection. Wound cx grew from 10/30 morganella morganii and rare diptheroids. She was treated with IV cefepime while hospitalized then continued with recommend to complete the ABX therapy with Levofloxacin umtil 11/11. At that time, Dr. Jaime Jones requested for her to be on suppressive therapy for sternal wound infection, so we recommended her to continue with Keflex 500mg twice a day starting 11/12. No drainage from mid sternal wound, site closed with scab, right anterior leg wound has mostly healed, no exudate, no erythema, and left lower extremity was covered with ACE bandage. Daughter provided the photo of left lower extremity wound, reviewed the wound care team's note. Patient has been receiving her medical care at the [address] [place] [address], this is her second admission to [facility].  
  
Pt's medical history include CAD, chronic systolic heart failure, cardiomyopathy, s/p LVAD and ICD, hx of breast (s/p mastoidectomies, chemo) and endometrial cancer (s/p hysterectomy), type II DM, and COPD.   
  
ID team was consulted for sternal and leg wound infection. Patient Active Problem List  
Diagnosis Code  Obesity, morbid ([facility] E66.01  
 Acute on chronic systolic CHF (congestive heart failure) ([facility]) I50.23  
 NICM (nonischemic cardiomyopathy) ([facility] I42.8  Coronary artery disease involving native coronary artery of native heart without angina pectoris I25.10  
 JED on CPAP G47.33, Z99.89  Chronic venous insufficiency I87.2  Ulcer of right foot, limited to breakdown of skin ([facility]) L97.521  
 NSVT (nonsustained ventricular tachycardia) ([facility]) I47.2  Anemia D64.9  ROCIO (acute kidney injury) ([facility] N17.9  Coagulopathy ([facility] D68.9  
 Open wound of left lower leg S81.802A Past Medical History:  
Diagnosis Date  Asthma  Cancer ([address] breast  
 Cancer ([address]   
 endometrial  
 Congestive heart failure, unspecified  CRI (chronic renal insufficiency)  Depression  Diabetes ([address]  Hypertension History reviewed. No pertinent family history. Social History Tobacco Use  Smoking status: Never Smoker  Smokeless tobacco: Never Used Substance Use Topics  Alcohol use: Not Currently Past Surgical History:  
Procedure Laterality Date  HX HERNIA REPAIR    
 HX HYSTERECTOMY  HX MASTECTOMY Prior to Admission medications Medication Sig Start Date End Date Taking? Authorizing Provider  
insulin detemir U-100 (Levemir U-100 Insulin) 100 unit/mL injection 25 Units by SubCUTAneous route nightly. Yes Provider, Historical  
ferrous sulfate (Iron) 325 mg (65 mg iron) tablet Take 1 Tab by mouth daily (with breakfast). 11/8/20  Yes Meche Ventura, DO  
levoFLOXacin (Levaquin) 750 mg tablet Take 1 Tab by mouth every other day for 4 days. 11/8/20 11/12/20 Yes Meche Ventura, DO  
cephALEXin (Keflex) 500 mg capsule Take 1 Cap by mouth two (2) times a day. 11/7/20  Yes Meche Ventura, DO  
mexiletine (MEXITIL) 150 mg capsule Take 1 Cap by mouth three (3) times daily. Indications: ventricular arrhythmias, a type of abnormal heart rhythm 11/7/20  Yes CHARLENE Ventura, DO  
carvediloL (COREG) 25 mg tablet Take 0.5 Tabs by mouth two (2) times daily (with meals). 11/5/20  Yes Felipe De Dios NP  
bumetanide (BUMEX) 2 mg tablet Take 1 Tab by mouth daily. 11/6/20  Yes Felipe De Dios NP  
magnesium oxide (MAG-OX) 400 mg tablet Take 1 Tab by mouth two (2) times a day. 11/5/20  Yes Felipe De Dios NP  
ergocalciferol (ERGOCALCIFEROL) 1,250 mcg (50,000 unit) capsule Take 1 Cap by mouth every seven (7) days. 10/5/20  Yes Kris Limon NP  
icosapent ethyL (VASCEPA) 1 gram capsule Take 1 Cap by mouth two (2) times daily (with meals). 10/5/20  Yes Paris Limon NP  
amLODIPine (NORVASC) 5 mg tablet Take 2.5 mg by mouth two (2) times a day. Yes Provider, Historical  
tolterodine (DETROL) 2 mg tablet Take 8 mg by mouth two (2) times a day.    Yes Provider, Historical  
 albuterol sulfate (PROVENTIL IN) Take 1 Puff by inhalation four (4) times daily as needed. Yes Provider, Historical  
budesonide-formoteroL (Symbicort) 160-4.5 mcg/actuation HFAA Take 2 Puffs by inhalation two (2) times daily as needed. Yes Provider, Historical  
hydrOXYzine HCL (ATARAX) 10 mg tablet Take 10 mg by mouth three (3) times daily as needed for Itching (hives). Yes Provider, Historical  
warfarin (COUMADIN) 1 mg tablet Take 4 mg by mouth daily. Yes Provider, Historical  
PARoxetine (PAXIL) 30 mg tablet Take 30 mg by mouth daily. Yes Provider, Historical  
gabapentin (NEURONTIN) 300 mg capsule Take 1 Cap by mouth daily. Max Daily Amount: 300 mg. 10/1/20  Yes Jonothan Goldberg, NP  
aspirin 81 mg tablet Take 81 mg by mouth daily. Yes Provider, Historical  
 
a No Known Allergies REVIEW OF SYSTEMS:    
Total of 12 systems reviewed as follows:  
I am not able to complete the review of systems because: The patient is intubated and sedated Y The patient has altered mental status due to his acute medical problems The patient has baseline aphasia from prior stroke(s) The patient has baseline dementia and is not reliable historian POSITIVE= underlined text  Negative = text not underlined General:  fever, chills, sweats, generalized weakness, weight loss/gain,  
   loss of appetite Eyes:    blurred vision, eye pain, loss of vision, double vision ENT:    rhinorrhea, pharyngitis Respiratory:   cough, sputum production, SOB, wheezing, HAIRSTON, pleuritic pain  
Cardiology:   chest pain, palpitations, orthopnea, PND, edema, syncope Gastrointestinal:  abdominal pain , N/V, dysphagia, diarrhea, constipation, bleeding Genitourinary:  frequency, urgency, dysuria, hematuria, incontinence Muskuloskeletal :  arthralgia, myalgia Hematology:  easy bruising, nose or gum bleeding, lymphadenopathy Dermatological: rash, ulceration, pruritis Endocrine:   hot flashes or polydipsia Neurological:  headache, dizziness, confusion, focal weakness, paresthesia, Speech difficulties, memory loss, gait disturbance Psychological: Feelings of anxiety, depression, agitation Objective:  
 
Visit Vitals Pulse 75 Temp 98.4 °F (36.9 °C) Resp 22 Wt 135 kg (297 lb 9.9 oz) SpO2 96% BMI 54.44 kg/m² Temp (24hrs), Av °F (37.2 °C), Min:98.4 °F (36.9 °C), Max:99.7 °F (37.6 °C) PHYSICAL EXAM:  
General:    Chronically ill appearing, Alert, lethargic, not following commends, no distress, appears older than stated age. HEENT: Atraumatic, anicteric sclerae, pink conjunctivae,  mucosa dry Neck:  Supple, symmetrical,  thyroid: non tender Lungs:   Clear in apex with decreased breath sounds at bases. No Wheezing or Rhonchi. No rales. Chest wall:  No tenderness  No Accessory muscle use. LVAD hum, LVAD catheter site; dressing dry and intact Heart:   Regular  rhythm,  No  murmur , trace of pitting edema Abdomen:   Soft, non-tender. obese. Bowel sounds normal 
Extremities: No cyanosis. No clubbing Skin:     Not pale. Not Jaundiced  No rashes Sternal wound; base of q-tip size, scabbed over, no drainage Right anterior lower extremity wound; no exudate, very mild erythema, flat,healing Left anterior low extremity wound; 
                         
 
Psych:  Unable to assess insight. Not depressed. Appear not anxious or agitated. Neurologic: Pupils are reactive to light. Pt not following commends, involuntary muscular twitching Data Review: CBC: 
Recent Labs 20 
0550 11/10/20 
4543 11/10/20 
6732 WBC 9.6 10.1 11.4* GRANS  --  79* 78* MONOS  --  7 8 EOS  --  2 2 ANEU  --  8.0 8.9* ABL  --  1.1 1.3 HGB 7.4* 7.0* 6.3* HCT 23.9* 22.7* 21.0*  
* 144* 157 BMP: 
Recent Labs 20 
1413 11/10/20 
5778 11/10/20 
3461 CREA 4.68* 4.89* 4.96* BUN 98* 94* 92*  137 138  
K 4.6 5.0 5.2*  
 102 101 CO2 27 28 28 AGAP 8 7 9 * 174* 170* LFTS: 
Recent Labs 11/11/20 
0549 11/10/20 
0511 11/09/20 1819 TBILI 0.4 0.3 0.4 ALT 11* 8* 7* AP 67 69 74  
TP 6.7 6.7 7.2 ALB 2.8* 2.9* 3.0* Microbiology:  
 
All Micro Results Procedure Component Value Units Date/Time CULTURE, BLOOD, PAIRED [[id number]] Collected:  11/10/20 1842 Order Status:  Completed Specimen:  Blood Updated:  11/11/20 7995 Special Requests: NO SPECIAL REQUESTS Culture result: NO GROWTH AFTER 9 HOURS Signed By: Paris Ferrari NP November 11, 2020

## 2020-11-11 NOTE — WOUND CARE
WOCN Note:  
 
New consult for leg avulsion; seen with Dr. Maico Lennon at bedside today. Chart shows: 
Admitted for leg avulsion post fall with some superficial debridement done by Dr. Maico Lennon History of LVAD, DM Daughter at bedside. Assessment:  
Appropriately conversational, reports pain with wound care but tolerates it fairly well. Needs assists in repositioning fully and turned onto left side. Bed: total care with foam mattress and alarm pad Pure Wick in use. Bilateral heel and sacral skin intact and without erythema. Heels offloaded with pillows. No edema or erythema to lower legs and feet.  hemosiderin staining to bilateral lower legs. 1. POA, left lower anterior leg = 7.5 x 6 x 0.7 cm  100% moist red newly traumatized tissue; non-granular. Small amount of bleeding with clotting. Immediate periwound skin with darkening that may be lost tissue but will wait for it to declare itself. Irregular/tattered margins. Rest of periwound with no redness or calor, no edema. Cleaned with saline, applied collagen with silver, covered with Mepitel One and dry bandage. ACE wrap form toes to knee. 2. POA, right lower anterior leg from previous edema per daughter's report = 1 x 0.6 x 0.2 cm  No exudate and base is moist red. Periwound with no redness. Margins are flat and wound appears ready to resurface with some light pink epithelialization at margin. Foam dressing applied. POA, bilateral buttocks with soft bulging purple tissue that fully blanches. 3. Right = 8 x 6 x 0 cm 4. Left = 5 x 3 x 0 cm Some dry desquamation over this area. Foam dressing applied to protect. 5. POA, right lower labia majora also has purple bulging tissue that is pliable and not taut, fully blanches. Recommendations: LLL: clean with saline, apply Collagen dressing (left in room), secure collagen with Mepitel One and then cover with dry gauze. Change every 3 days. RLL: clean with saline, apply petrolatum and foam dressing. Change every 3 days. Buttocks: protect with foam dressing and change as needed with soiling Turn/reposition approximately every 2 hours and offload heels with pillows at all times in bed. EHOB cushion placed over bed alarm pad. Transition of Care: Plan to follow as needed while admitted to hospital.  Plan to check again on Friday. Margretta Osgood, BSN, RN, [employer] Certified Wound, Ostomy, Continence Nurse 
office [phone] 
pager [phone] or call  to page

## 2020-11-11 NOTE — PROGRESS NOTES
[facility] VAD/Heart Failure Progress Note Admit Date: 2020 POD:  * No surgery found * Procedure:      
 
 Subjective:  
Here for bite infection; feels good otherwise Objective:  
Vitals: 
Pulse 72, temperature 98.8 °F (37.1 °C), resp. rate 20, weight 297 lb 9.9 oz (135 kg), SpO2 94 %. Temp (24hrs), Av.9 °F (37.2 °C), Min:98.4 °F (36.9 °C), Max:99.7 °F (37.6 °C) Hemodynamics: 
 CO:   
 CI:   
 CVP:   
 SVR:   
 PAP Systolic:   
 PAP Diastolic:   
 PVR:   
 ZQ35:   
 SCV02:   
 
VAD Interrogation: LVAD (Heartmate) Pump Speed (RPM): 9400 Pump Flow (LPM): 6.1 PI (Pulsitility Index): 4.4 Power: 6.2  Test: Yes 
Back Up  at Bedside & Labeled: Yes Power Module Test: Yes Driveline Site Care: No 
Driveline Dressing: Clean, Dry, and Intact EKG/Rhythm:   
 
Extubation Date / Time:  
 
CT Output:  
 
Ventilator: 
  
 
Oxygen Therapy: 
Oxygen Therapy O2 Sat (%): 94 % (20 1231) Pulse via Oximetry: 97 beats per minute (11/10/20 1100) O2 Device: Room air (20 0802) O2 Flow Rate (L/min): 2 l/min (20 0313) CXR: 
 
Admission Weight: Last Weight Weight: 297 lb 8 oz (134.9 kg) Weight: 297 lb 9.9 oz (135 kg) Intake / Output / Drain: 
Current Shift:  0701 -  1900 In: 200 [P.O.:100; I.V.:100] Out: 200 [Urine:200] Last 24 hrs.:  
 
Intake/Output Summary (Last 24 hours) at 2020 1358 Last data filed at 2020 2142 Gross per 24 hour Intake 527.9 ml Output 550 ml Net -22.1 ml No results for input(s): CPK, CKMB, TROIQ in the last 72 hours. Recent Labs 20 
1216 20 
0550 20 
0549 11/10/20 
4387 11/10/20 
0767 NA  --   --  138 137 138 K  --   --  4.6 5.0 5.2*  
CO2  --   --  27 28 28 BUN  --   --  98* 94* 92* CREA  --   --  4.68* 4.89* 4.96* GLU  --   --  171* 174* 170* PHOS  --   --  5.2*  --   --   
WBC  --  9.6  --  10.1 11.4*  
 HGB 7.0* 7.4*  --  7.0* 6.3* HCT 22.6* 23.9*  --  22.7* 21.0*  
PLT  --  140*  --  144* 157 Recent Labs 11/11/20 
0549 11/10/20 
0511 11/09/20 
1819 INR 2.3* 2.4* 2.6* PTP 23.6* 24.5* 25.7* APTT  --   --  46.2* No lab exists for component: PBNP Current Facility-Administered Medications:  
  albumin human 25% (BUMINATE) solution 25 g, 25 g, IntraVENous, Q6H, Jaspreet Mendoza MD, 25 g at 11/11/20 1235 
  0.9% sodium chloride infusion 250 mL, 250 mL, IntraVENous, PRN, Saima Scott DO 
  [Held by provider] carvediloL (COREG) tablet 12.5 mg, 12.5 mg, Oral, BID WITH MEALS, Александр Acevedo MD, 12.5 mg at 11/10/20 3980   ferrous sulfate tablet 325 mg, 325 mg, Oral, DAILY WITH BREAKFAST, Cally KATE DO, 325 mg at 11/11/20 0901 
  docusate sodium (COLACE) capsule 100 mg, 100 mg, Oral, BID, Cally KATE DO, 100 mg at 11/10/20 1351 
  gabapentin (NEURONTIN) capsule 300 mg, 300 mg, Oral, DAILY, Cally KATE DO, 300 mg at 11/11/20 0901   hydrOXYzine HCL (ATARAX) tablet 10 mg, 10 mg, Oral, TID PRN, Saima Scott DO 
  PARoxetine (PAXIL) tablet 30 mg, 30 mg, Oral, DAILY, Cally KATE DO, 30 mg at 11/11/20 0901 
  docusate sodium (COLACE) capsule 100 mg, 100 mg, Oral, BID, Saima Ventura DO 
  glucose chewable tablet 16 g, 4 Tab, Oral, PRN, Cally KATE DO 
  glucagon (GLUCAGEN) injection 1 mg, 1 mg, IntraMUSCular, PRN, Cally KATE DO 
  dextrose 10% infusion 0-250 mL, 0-250 mL, IntraVENous, PRN, Meche Scott,  
  insulin lispro (HUMALOG) injection, , SubCUTAneous, AC&HS, Cally KATE, DO, 2 Units at 11/11/20 1236   cefepime (MAXIPIME) 2 g in 0.9% sodium chloride (MBP/ADV) 100 mL MBP, 2 g, IntraVENous, Q24H, Meche Ventura, , Last Rate: 200 mL/hr at 11/11/20 0628, 2 g at 11/11/20 0628 
  0.9% sodium chloride infusion, 75 mL/hr, IntraVENous, CONTINUOUS, Jose Miguel Lebron MD 
   0.9% sodium chloride infusion 250 mL, 250 mL, IntraVENous, PRN, Casimiro Torres MD 
  sodium chloride (NS) flush 5-40 mL, 5-40 mL, IntraVENous, Q8H, Scott Bedolla MD, 10 mL at 11/11/20 3542 
  sodium chloride (NS) flush 5-40 mL, 5-40 mL, IntraVENous, PRN, Scott Bedolla MD 
  acetaminophen (TYLENOL) tablet 650 mg, 650 mg, Oral, Q6H PRN **OR** acetaminophen (TYLENOL) suppository 650 mg, 650 mg, Rectal, Q6H PRN, Scott Bedolla MD 
  polyethylene glycol (MIRALAX) packet 17 g, 17 g, Oral, DAILY PRN, Scott Bedolla MD 
  promethazine (PHENERGAN) tablet 12.5 mg, 12.5 mg, Oral, Q6H PRN **OR** ondansetron (ZOFRAN) injection 4 mg, 4 mg, IntraVENous, Q6H PRN, Scott Bedolla MD 
 
A/P 
 
S/P LVAD - good flows Need for anticoagulation - coumadin Sternal wound infecion - abx's Bite - abx's Risk of morbidity and mortality - high Medical decision making - high complexity Signed By: Selvin Diaz MD

## 2020-11-11 NOTE — PROGRESS NOTES
[facility] 
 [address] [facility], [facility] [address] [facility] Phone: [phone]  
  
Nephrology Progress Note 
Deanna Fontenot     1950     [id number] Date of Admission : 11/9/2020 11/11/20 CC: Follow up for ARF Assessment and Plan ROCIO on CKD - DD includes : Hypovolemia, ATN, Post Infectious GN  
- No Obstruction on imaging  
- UOP and Cr improving w/ IVF. Urine lytes still pending  
- continue NS at 75 cc/ hr and hold Bumex for another 24 hrs  
- avoid all potential nephrotoxins and dose vanc by levels  
  
CKD stage III. -Baseline creatinine prior to last admission was 1.3 mg/dL 
-Creatinine close to 2.0-2.1 when she was euvolemic. 
-Etiology of CKD is likely diabetes hypertension and cardiomyopathy. However she does have a positive PYP testing which raises the possibility of amyloidosis. 
  
Chronic systolic CHF, stage C NYHA class IV 
-Combined ischemic and nonischemic cardiomyopathy 
-S/p HM 2 LVAD implant 4/15/2017 by Dr. Jay Jay Lau at [facility] 
-Recent acute on chronic RV failure. -Recent sternal wound infection with staph aureus and Morganella. Has LVAD site drainage. 
  
Left Calf laceration Blood loss anemia - per primary team/ surgery Severe COPD. Pulmonary hypertension. Chronic A. fib. History of endometrial answer ATTR amyloidosis 
  
Care Plan discussed with: Pt Interval History: 
Seen and examined She reports feeling better She is more awake and con versive today Improving UOP Still confused about events leading up to re-admission Review of Systems: A comprehensive review of systems was negative except for that written in the HPI. Current Medications:  
Current Facility-Administered Medications Medication Dose Route Frequency  albumin human 25% (BUMINATE) solution 25 g  25 g IntraVENous Q6H  
 [Held by provider] carvediloL (COREG) tablet 12.5 mg  12.5 mg Oral BID WITH MEALS  
  ferrous sulfate tablet 325 mg  325 mg Oral DAILY WITH BREAKFAST  docusate sodium (COLACE) capsule 100 mg  100 mg Oral BID  
 gabapentin (NEURONTIN) capsule 300 mg  300 mg Oral DAILY  hydrOXYzine HCL (ATARAX) tablet 10 mg  10 mg Oral TID PRN  
 PARoxetine (PAXIL) tablet 30 mg  30 mg Oral DAILY  docusate sodium (COLACE) capsule 100 mg  100 mg Oral BID  
 glucose chewable tablet 16 g  4 Tab Oral PRN  
 glucagon (GLUCAGEN) injection 1 mg  1 mg IntraMUSCular PRN  
 dextrose 10% infusion 0-250 mL  0-250 mL IntraVENous PRN  
 insulin lispro (HUMALOG) injection   SubCUTAneous AC&HS  cefepime (MAXIPIME) 2 g in 0.9% sodium chloride (MBP/ADV) 100 mL MBP  2 g IntraVENous Q24H  
 0.9% sodium chloride infusion  75 mL/hr IntraVENous CONTINUOUS  
 0.9% sodium chloride infusion 250 mL  250 mL IntraVENous PRN  
 sodium chloride (NS) flush 5-40 mL  5-40 mL IntraVENous Q8H  
 sodium chloride (NS) flush 5-40 mL  5-40 mL IntraVENous PRN  
 acetaminophen (TYLENOL) tablet 650 mg  650 mg Oral Q6H PRN Or  
 acetaminophen (TYLENOL) suppository 650 mg  650 mg Rectal Q6H PRN  polyethylene glycol (MIRALAX) packet 17 g  17 g Oral DAILY PRN  promethazine (PHENERGAN) tablet 12.5 mg  12.5 mg Oral Q6H PRN Or  
 ondansetron (ZOFRAN) injection 4 mg  4 mg IntraVENous Q6H PRN No Known Allergies Objective: 
Vitals:   
Vitals:  
 11/11/20 0145 11/11/20 0200 11/11/20 0313 11/11/20 0802 Pulse: 78 76 78 75 Resp: 18 18 18 22 Temp: 99.1 °F (37.3 °C) 99 °F (37.2 °C) 98.5 °F (36.9 °C) 98.4 °F (36.9 °C) SpO2: 100% 100% 100% 96% Weight:   135 kg (297 lb 9.9 oz) Intake and Output: 
No intake/output data recorded. 11/09 1901 - 11/11 0700 In: 701.7 Out: 350 [Urine:350] Physical Examination: 
General: Morbidly obese, HEENT: PERRL,  No Pallor , No Icterus Neck: Supple,no mass palpable Lungs : CTA 
CVS: RRR, VAD sounds Abdomen: Soft, NT, BS + 
 Extremities: Bilateral lower extremity edema 1+, Ace wraps around left lower extremity. Skin: No rash or lesions. MS: No joint swelling, erythema, warmth Neurologic: Confused, jerking movements Psych: Unable to assess 
 
[]    High complexity decision making was performed 
[]    Patient is at high-risk of decompensation with multiple organ involvement Lab Data Personally Reviewed: I have reviewed all the pertinent labs, microbiology data and radiology studies during assessment. Recent Labs 11/11/20 
0549 11/10/20 
0947 11/10/20 
0511 11/09/20 1819 11/09/20 
0115 11/09/20  137 138 136 138  --   
K 4.6 5.0 5.2* 6.0* 5.6*  --   
 102 101 100 102  --   
CO2 27 28 28 25 30  --   
* 174* 170* 192* 164*  --   
BUN 98* 94* 92* 90* 79*  --   
CREA 4.68* 4.89* 4.96* 4.58* 3.46*  --   
CA 8.4* 8.6 9.0 8.8 8.8  --   
PHOS 5.2*  --   --   --   --   --   
ALB 2.8*  --  2.9* 3.0* 3.2*  --   
ALT 11*  --  8* 7* 8*  --   
INR 2.3*  --  2.4* 2.6* 2.4* 2.4 Recent Labs 11/11/20 
0550 11/10/20 
1827 11/10/20 
0511 11/09/20 
1819 11/09/20 
0115 WBC 9.6 10.1 11.4* 11.8* 14.1* HGB 7.4* 7.0* 6.3* 6.9* 8.1* HCT 23.9* 22.7* 21.0* 23.1* 27.9*  
* 144* 157 202 183 No results found for: SDES Lab Results Component Value Date/Time Culture result: NO GROWTH AFTER 9 HOURS 11/10/2020 06:42 PM  
 Culture result: No Growth (<1000 cfu/mL) 11/09/2020 12:05 AM  
 Culture result: SCANT Morganella morganii ssp morganii (A) 10/30/2020 11:30 AM  
 Culture result: SCANT Morganella morganii ssp morganii (A) 10/30/2020 11:30 AM  
 Culture result: RARE DIPHTHEROIDS (A) 10/30/2020 11:30 AM  
 
Recent Results (from the past 24 hour(s)) GLUCOSE, POC Collection Time: 11/10/20  8:57 AM  
Result Value Ref Range Glucose (POC) 189 (H) 65 - 100 mg/dL Performed by Adalberto Gonzales ACID Collection Time: 11/10/20  9:47 AM  
Result Value Ref Range  Lactic acid 0.6 0.4 - 2.0 MMOL/L  
 PROCALCITONIN Collection Time: 11/10/20  9:47 AM  
Result Value Ref Range Procalcitonin 0.95 ng/mL SAMPLES BEING HELD Collection Time: 11/10/20  9:47 AM  
Result Value Ref Range SAMPLES BEING HELD 1PST,1LAV   
 COMMENT Add-on orders for these samples will be processed based on acceptable specimen integrity and analyte stability, which may vary by analyte. METABOLIC PANEL, BASIC Collection Time: 11/10/20  9:47 AM  
Result Value Ref Range Sodium 137 136 - 145 mmol/L Potassium 5.0 3.5 - 5.1 mmol/L Chloride 102 97 - 108 mmol/L  
 CO2 28 21 - 32 mmol/L Anion gap 7 5 - 15 mmol/L Glucose 174 (H) 65 - 100 mg/dL BUN 94 (H) 6 - 20 MG/DL Creatinine 4.89 (H) 0.55 - 1.02 MG/DL  
 BUN/Creatinine ratio 19 12 - 20 GFR est AA 11 (L) >60 ml/min/1.73m2 GFR est non-AA 9 (L) >60 ml/min/1.73m2 Calcium 8.6 8.5 - 10.1 MG/DL  
CBC WITH AUTOMATED DIFF Collection Time: 11/10/20  9:47 AM  
Result Value Ref Range WBC 10.1 3.6 - 11.0 K/uL  
 RBC 2.64 (L) 3.80 - 5.20 M/uL HGB 7.0 (L) 11.5 - 16.0 g/dL HCT 22.7 (L) 35.0 - 47.0 % MCV 86.0 80.0 - 99.0 FL  
 MCH 26.5 26.0 - 34.0 PG  
 MCHC 30.8 30.0 - 36.5 g/dL  
 RDW 17.9 (H) 11.5 - 14.5 % PLATELET 247 (L) 382 - 400 K/uL MPV 11.1 8.9 - 12.9 FL  
 NRBC 0.0 0  WBC ABSOLUTE NRBC 0.00 0.00 - 0.01 K/uL NEUTROPHILS 79 (H) 32 - 75 % LYMPHOCYTES 11 (L) 12 - 49 % MONOCYTES 7 5 - 13 % EOSINOPHILS 2 0 - 7 % BASOPHILS 0 0 - 1 % IMMATURE GRANULOCYTES 1 (H) 0.0 - 0.5 % ABS. NEUTROPHILS 8.0 1.8 - 8.0 K/UL  
 ABS. LYMPHOCYTES 1.1 0.8 - 3.5 K/UL  
 ABS. MONOCYTES 0.7 0.0 - 1.0 K/UL  
 ABS. EOSINOPHILS 0.2 0.0 - 0.4 K/UL  
 ABS. BASOPHILS 0.0 0.0 - 0.1 K/UL  
 ABS. IMM. GRANS. 0.1 (H) 0.00 - 0.04 K/UL  
 DF AUTOMATED    
GLUCOSE, POC Collection Time: 11/10/20  1:50 PM  
Result Value Ref Range Glucose (POC) 192 (H) 65 - 100 mg/dL  Performed by Lizette Sheffield   
RBC, ALLOCATE  
 Collection Time: 11/10/20  5:15 PM  
Result Value Ref Range HISTORY CHECKED? Historical check performed CULTURE, BLOOD, PAIRED Collection Time: 11/10/20  6:42 PM  
 Specimen: Blood Result Value Ref Range Special Requests: NO SPECIAL REQUESTS Culture result: NO GROWTH AFTER 9 HOURS    
GLUCOSE, POC Collection Time: 11/10/20 10:21 PM  
Result Value Ref Range Glucose (POC) 203 (H) 65 - 100 mg/dL Performed by Belinda HAJI PCT   
PHOSPHORUS Collection Time: 11/11/20  5:49 AM  
Result Value Ref Range Phosphorus 5.2 (H) 2.6 - 4.7 MG/DL  
METABOLIC PANEL, COMPREHENSIVE Collection Time: 11/11/20  5:49 AM  
Result Value Ref Range Sodium 138 136 - 145 mmol/L Potassium 4.6 3.5 - 5.1 mmol/L Chloride 103 97 - 108 mmol/L  
 CO2 27 21 - 32 mmol/L Anion gap 8 5 - 15 mmol/L Glucose 171 (H) 65 - 100 mg/dL BUN 98 (H) 6 - 20 MG/DL Creatinine 4.68 (H) 0.55 - 1.02 MG/DL  
 BUN/Creatinine ratio 21 (H) 12 - 20 GFR est AA 11 (L) >60 ml/min/1.73m2 GFR est non-AA 9 (L) >60 ml/min/1.73m2 Calcium 8.4 (L) 8.5 - 10.1 MG/DL Bilirubin, total 0.4 0.2 - 1.0 MG/DL  
 ALT (SGPT) 11 (L) 12 - 78 U/L  
 AST (SGOT) 56 (H) 15 - 37 U/L Alk. phosphatase 67 45 - 117 U/L Protein, total 6.7 6.4 - 8.2 g/dL Albumin 2.8 (L) 3.5 - 5.0 g/dL Globulin 3.9 2.0 - 4.0 g/dL A-G Ratio 0.7 (L) 1.1 - 2.2 LACTIC ACID Collection Time: 11/11/20  5:49 AM  
Result Value Ref Range Lactic acid 0.7 0.4 - 2.0 MMOL/L  
PROTHROMBIN TIME + INR Collection Time: 11/11/20  5:49 AM  
Result Value Ref Range INR 2.3 (H) 0.9 - 1.1 Prothrombin time 23.6 (H) 9.0 - 11.1 sec CBC W/O DIFF Collection Time: 11/11/20  5:50 AM  
Result Value Ref Range WBC 9.6 3.6 - 11.0 K/uL  
 RBC 2.68 (L) 3.80 - 5.20 M/uL HGB 7.4 (L) 11.5 - 16.0 g/dL HCT 23.9 (L) 35.0 - 47.0 % MCV 89.2 80.0 - 99.0 FL  
 MCH 27.6 26.0 - 34.0 PG  
 MCHC 31.0 30.0 - 36.5 g/dL  
 RDW 17.6 (H) 11.5 - 14.5 % PLATELET 461 (L) 867 - 400 K/uL MPV 11.1 8.9 - 12.9 FL  
 NRBC 0.0 0  WBC ABSOLUTE NRBC 0.00 0.00 - 0.01 K/uL GLUCOSE, POC Collection Time: 11/11/20  6:35 AM  
Result Value Ref Range Glucose (POC) 182 (H) 65 - 100 mg/dL Performed by Jeronimo Mckenzie Total time spent with patient:  xxx   min. Care Plan discussed with: 
Patient Family RN Consulting Physician [facility]     
 
I have reviewed the flowsheets. Chart and Pertinent Notes have been reviewed. No change in PMH ,family and social history from Consult note.  
 
 
Jocelynn Myers MD

## 2020-11-11 NOTE — CONSULTS
Cardiac Electrophysiology Hospital Consultation Note REFERRING PROVIDER: Dr Blanca Del Cid Subjective:  
  
Nataly Murphy is a 78 y.o. patient who is seen for evaluation of mexiletine intolerance Her daughter said she was unsteady, nauseated and shaky from taking levaquin and mexiletine She fell and suffered leg wound bleeding and is readmitted to hospital  
She is treated for leg cellulitis and has renal failure, advanced CHF systolic and anemia I had seen her 11/2/2020 at the last hospital admission for recurrent VT and dizziness She has [address] RV pacing > 90% Battery 2 years left Pacing rate was 70 bpm to suppress PVCs 
  
Admitted 10/27/2020 with RLE cellulitis, venous stasis wound, possible sternal wound infection, & acute on chronic CHF. 
  
RV & LV failure, is s/p LVAD in 2017 at [facility].  NYHA III chronic systolic CHF. On appropriate GDMT. 
  
ICD check during admission showed normal device function, adequate generator longevity. 
  
Complained of chest tightness PTA, HAIRSTON, & lower extremity edema. She denies orthopnea.   
  
Receiving daptomycin & cefepime for wound infections. Wound culture showed scant Morganella morganii. 
  
DC on levaquin 
  
Nuclear amyloid scan amyloid ATTR by PYP scan 
10/27/20 ECHO ADULT COMPLETE 11/02/2020 11/2/2020 Narrative · LVAD speed 9400-AV opens each beat, MV with decreased excursion LVIDd  
6.2 cm LVIDs 5.8 cm · LV: Estimated LVEF is 15 - 20%. · LA: Mildly dilated left atrium. · AV: Sclerosis with no significant stenosis. · RV: Mildly dilated right ventricle. Mildly reduced systolic function. Pacer/ICD present. · Image quality for this study was technically difficult. Signed by: Lynn Lui MD  
 
 
   
Previous: 
Echo (10/27/2020): LVEF 15-20%, severely dilated LV, LVAD present. Dilated LA.   Mild MR.   
  
Treated for abscess of sternal wound since late 2019. 
  
 [facility] (2018): 2 vessel disease with 90% OM, 80% PDA, DSA to PDA branch 
  
S/p PCI approx 2017. Reports history of Plavix resistance. 
  
[facility] (2016): High grade ramus & small PDA disease, borderline disease of LAD & takeoff of pRCA. 
  
Previously followed by [facility], now sees Dr. Benjamin Ku. 
  
Mother with CAD,  age 76. Father  age [de-identified], CAD & MI.  Brother with valve replacement. 
  
   
 
Patient Active Problem List  
Diagnosis Code  Obesity, morbid ([facility] E66.01  
 Acute on chronic systolic CHF (congestive heart failure) ([facility]) I50.23  
 NICM (nonischemic cardiomyopathy) ([facility] I42.8  Coronary artery disease involving native coronary artery of native heart without angina pectoris I25.10  
 JED on CPAP G47.33, Z99.89  Chronic venous insufficiency I87.2  Ulcer of right foot, limited to breakdown of skin ([facility]) L97.521  
 NSVT (nonsustained ventricular tachycardia) ([facility]) I47.2  Anemia D64.9  ROCIO (acute kidney injury) ([facility] N17.9  Coagulopathy ([facility] D68.9  
 Open wound of left lower leg S81.802A Current Facility-Administered Medications Medication Dose Route Frequency Provider Last Rate Last Dose  albumin human 25% (BUMINATE) solution 25 g  25 g IntraVENous Q6H Jaspreet Mendoza MD   25 g at 20 1235  
 0.9% sodium chloride infusion 250 mL  250 mL IntraVENous PRN Adin Nearing M, DO      
 ranolazine ER (RANEXA) tablet 500 mg  500 mg Oral BID Ryanne Johnosn MD      
 [Held by provider] carvediloL (COREG) tablet 12.5 mg  12.5 mg Oral BID WITH MEALS Dori Chapman MD   12.5 mg at 11/10/20 5059  ferrous sulfate tablet 325 mg  325 mg Oral DAILY WITH BREAKFAST Adin Nearing M, [place]   325 mg at 20 0901  
 docusate sodium (COLACE) capsule 100 mg  100 mg Oral BID Adin Nearing M, DO   100 mg at 11/10/20 1351  
 gabapentin (NEURONTIN) capsule 300 mg  300 mg Oral DAILY Adin Nearing M, DO   300 mg at 20 0579  hydrOXYzine HCL (ATARAX) tablet 10 mg  10 mg Oral TID PRN Cally Ronny M, DO      
 PARoxetine (PAXIL) tablet 30 mg  30 mg Oral DAILY Starks Ronny , DO   30 mg at 11/11/20 0901  
 docusate sodium (COLACE) capsule 100 mg  100 mg Oral BID Starks Ronny KATE, DO      
 glucose chewable tablet 16 g  4 Tab Oral PRN Cally Ronny M, DO      
 glucagon (GLUCAGEN) injection 1 mg  1 mg IntraMUSCular PRN Starks Ronny , DO      
 dextrose 10% infusion 0-250 mL  0-250 mL IntraVENous PRN Starks Ronny , DO      
 insulin lispro (HUMALOG) injection   SubCUTAneous AC&HS Starks Ronny , DO   2 Units at 11/11/20 1236  cefepime (MAXIPIME) 2 g in 0.9% sodium chloride (MBP/ADV) 100 mL MBP  2 g IntraVENous Q24H Mehce Scott,  mL/hr at 11/11/20 0628 2 g at 11/11/20 2269  
 0.9% sodium chloride infusion  75 mL/hr IntraVENous CONTINUOUS Jaspreet Mendoza MD      
 0.9% sodium chloride infusion 250 mL  250 mL IntraVENous PRN Concepcion Sullivan MD      
 sodium chloride (NS) flush 5-40 mL  5-40 mL IntraVENous Q8H Александр Acevedo MD   10 mL at 11/11/20 0629  
 sodium chloride (NS) flush 5-40 mL  5-40 mL IntraVENous PRN Александр Acevedo MD      
 acetaminophen (TYLENOL) tablet 650 mg  650 mg Oral Q6H PRN Александр Acevedo MD      
 Or  
[address] [place] [address] acetaminophen (TYLENOL) suppository 650 mg  650 mg Rectal Q6H PRN Александр Acevedo MD      
 polyethylene glycol (MIRALAX) packet 17 g  17 g Oral DAILY PRN Александр Acevedo MD      
 promethazine (PHENERGAN) tablet 12.5 mg  12.5 mg Oral Q6H PRN Александр Acevedo MD      
 Or  
 ondansetron [facility]) injection 4 mg  4 mg IntraVENous Q6H PRN Александр Acevedo MD      
 
No Known Allergies Past Medical History:  
Diagnosis Date  Asthma  Cancer ([facility] breast  
 Cancer ([facility]   
 endometrial  
 Congestive heart failure, unspecified  CRI (chronic renal insufficiency)  Depression  Diabetes ([address]  Hypertension Past Surgical History: Procedure Laterality Date  HX HERNIA REPAIR    
 HX HYSTERECTOMY  HX MASTECTOMY See HPI for family history. Social History Tobacco Use  Smoking status: Never Smoker  Smokeless tobacco: Never Used Substance Use Topics  Alcohol use: Not Currently Review of Systems:  
Constitutional: Negative for fever, chills, weight loss, + malaise/fatigue. HEENT: Negative for nosebleeds, vision changes. Respiratory: Negative for cough, hemoptysis Cardiovascular: Negative for chest pain, palpitations, orthopnea, claudication, + leg swelling, no syncope, and PND. Gastrointestinal: Negative for nausea, vomiting, diarrhea, blood in stool and melena. Genitourinary: Negative for dysuria, and hematuria. Musculoskeletal: Negative for myalgias, + arthralgia. Skin: Negative for rash. Heme: + bleed or bruise easily. Neurological: Negative for speech change and + general weakness Objective: LVAD Visit Vitals Pulse 72 Temp 98.8 °F (37.1 °C) Resp 20 Wt 297 lb 9.9 oz (135 kg) SpO2 94% BMI 54.44 kg/m² Physical Exam:  
Constitutional: well-developed and well-nourished. No respiratory distress. Head: Normocephalic and atraumatic. Eyes: Pupils are equal, round ENT: hearing normal 
Neck: supple. No JVD present. Cardiovascular: LVAD sound Pulmonary/Chest: Effort normal and breath sounds normal. No wheezes. Abdominal: morbidly obese Musculoskeletal: legs with wound dressing on Neurological: alert,oriented. Skin: Skin is warm and dry Psychiatric: normal mood and affect. Behavior is normal. Judgment and thought content normal.   
 
CMP:  
Lab Results Component Value Date/Time   11/11/2020 05:49 AM  
 K 4.6 11/11/2020 05:49 AM  
  11/11/2020 05:49 AM  
 CO2 27 11/11/2020 05:49 AM  
 AGAP 8 11/11/2020 05:49 AM  
  (H) 11/11/2020 05:49 AM  
 BUN 98 (H) 11/11/2020 05:49 AM  
 CREA 4.68 (H) 11/11/2020 05:49 AM  
 GFRAA 11 (L) 11/11/2020 05:49 AM  
 GFRNA 9 (L) 11/11/2020 05:49 AM  
 CA 8.4 (L) 11/11/2020 05:49 AM  
 PHOS 5.2 (H) 11/11/2020 05:49 AM  
 ALB 2.8 (L) 11/11/2020 05:49 AM  
 TP 6.7 11/11/2020 05:49 AM  
 GLOB 3.9 11/11/2020 05:49 AM  
 AGRAT 0.7 (L) 11/11/2020 05:49 AM  
 ALT 11 (L) 11/11/2020 05:49 AM  
 
CBC:  
Lab Results Component Value Date/Time WBC 9.6 11/11/2020 05:50 AM  
 HGB 7.0 (L) 11/11/2020 12:16 PM  
 HCT 22.6 (L) 11/11/2020 12:16 PM  
  (L) 11/11/2020 05:50 AM  
  
COAGS:  
Lab Results Component Value Date/Time PTP 23.6 (H) 11/11/2020 05:49 AM  
 INR 2.3 (H) 11/11/2020 05:49 AM  
 
 
Assessment/Plan: ICD-10-CM ICD-9-CM 1. Acute encephalopathy  G93.40 348.30   
2. Blood loss anemia  D50.0 280.0 3. Acute renal failure, unspecified acute renal failure type ([facility] [address]  N17.9 584.9 4. Open wound of left lower leg, initial encounter  S81.802A 891.0   
  891.0   
5 Acute on chronic systolic CHF (congestive heart failure) ([facility])  I50.23 428.23   
  428.0   
6       VT with dizziness She did not tolerate mexiletine last time given nausea Now it is questionable if she had neurologic side effect. Usually mexiletine would be related to GERD and ataxia. She has involuntary jerking while lying in bed I spoke to her and her daughter at length today about side effects and to know if she has these, I will have to stop mexiletine She is not good candidate for amiodarone Off label medication is ranexa 500-1000 mg BID They agree to try this for VT Ablation is not possible given her high risks with infection, LVAD, morbid obesity, severe anemia and JED Thank you for involving me in this patient's care and please call with further concerns or questions. Edy Perry M.D. Electrophysiology/Cardiology [facility] [place] [address], [address] [phone]

## 2020-11-11 NOTE — PROGRESS NOTES
Transition of Care Plan:     
Ongoing medical evaluation/ work-up. Cardiology/ Nephrology consulted. Awaiting recommendations. Anticipated LOS due to medical concerns. Open to [facility] PT/OT and SN. Need resumption orders for Home Health upon discharge. DISPOSITION--Return home when stable [place] [facility] Readmission Assessment Number of days since last admission?: 1-7 days Previous disposition: Home with Home Health Who is being interviewed?: Caregiver, Patient What was the patient's/caregiver's perception as to why they think they needed to return back to the hospital?: Did not realize care needs would be so extensive, Did not understand their medication regimen, Not enough help at home Did you visit your Primary Care Physician after you left the hospital, before you returned this time?: No 
Why weren't you able to visit your PCP?: Did not have an appointment Did you see a specialist, such as Cardiac, Pulmonary, Orthopedic Physician, etc. after you left the hospital?: Yes Who advised the patient to return to the hospital?: Physician Does the patient report anything that got in the way of taking their medications?: No 
In our efforts to provide the best possible care to you and others like you, can you think of anything that we could have done to help you after you left the hospital the first time, so that you might not have needed to return so soon?: Arrange for more help when leaving the hospital, Identify patient's health literacy needs, Education on how to continue taking medications upon discharge, Discharge instructions that are concise, clear, and non contradictory Reason for Readmission:  Increased fatigue and fall; Inpatient status 11/9 RUR Score/Risk Level:   30%/HIGH RISK   
 
PCP:    First and Last name:  Kasia Garcia NP Name of Practice: NA             Are you a current patient: Yes/No: Yes 
 Approximate date of last visit: NA 
            Can you participate in a virtual visit if needed: NA Is a Care Conference indicated: N/A Did you attend your follow up appointment (s): If not, why not: No, Patient was re-admitted prior to scheduled appointment. Resources/supports as identified by patient/family: Identified her daughter, Amparo Vance [phone] as primary Medical Decision Maker. Top Challenges facing patient (as identified by patient/family and CM): Finances/Medication cost?     Medicare Part/B and [employer]. Fills Rx at [email or web address]. Transportation      Herself and/or daughter Support system or lack thereof? Her daughter, Napoleon Leon Living arrangements? Lives alone in her own home Self-care/ADLs/Cognition? Independent with ADLs/IADLs and has an LVAD, trilogy, rollator, cane and walker. Current Advanced Directive/Advance Care Plan:  Full code Plan for utilizing home health:   Patient discharged on 11/7 with [facility]. Will need resumption orders upon discharge. Care Management Interventions PCP Verified by CM: Yes 
Palliative Care Criteria Met (RRAT>21 & CHF Dx)?: No 
Mode of Transport at Discharge: Other (see comment) Transition of Care Consult (CM Consult): Discharge Planning, Home Health [facility] - INPATIENT: No 
Reason Outside [place]: Patient already serviced by other home care/hospice agency(Open to [facility] ) Discharge Durable Medical Equipment: No 
Physical Therapy Consult: No 
Occupational Therapy Consult: No 
Speech Therapy Consult: No 
Current Support Network: Lives Alone Confirm Follow Up Transport: Family Discharge Location Discharge Placement: Home with home health(Resume HH with [facility] ) CM to follow. ENMA Ashton,CRM

## 2020-11-11 NOTE — PROGRESS NOTES
[facility] [facility] Adult  Hospitalist Group Hospitalist Progress Note [address] [facility],  Answering service: [phone] OR [phone] from in house phone Date of Service:  2020 NAME:  Shyanne Thompson :  1950 MRN:  [id number] Admission Summary:  
78 y.o. female with past medical history significant for chronic systolic heart failure, ischemic cardiomyopathy with LVAD in place, hypertension, diabetes mellitus type 2 presented emergency room with increased fatigue and fall. Patient was recently admitted to the hospital and discharged on 2020 for similar hospital after being treated for cellulitis of the right lower extremity. Daughter states that since he was discharged patient has been more fatigue and having involuntary movement of her body. During that hospital stay she was started on 2 new medications including Levaquin and mexiletine. Daughter said that yesterday she fell at home injuring her left leg. She has an open wound and to the emergency room. She states that she had wound packed and sent home. Today daughter noticed that the wound started bleeding significantly for which she came to the emergency room. In the emergency room her work-up showed a hemoglobin of 6.9 down from8. As well she was found to have with worsening renal function. Admission was requested for ROCIO and anemia. In the ed wound was significantly bleeding. ED physician stopped bleeding with quick clot and its currently wrapped.  
  
 
Interval history / Subjective: Follow up acute anemia, wound. Patient seen and examined earlier today. Awakens to voice and falls asleep easily. Hgb not responding well.  1 unit pRBCs today. Continues to have bilateral abnormal leg movements. Assessment & Plan:  
 
New right leg wound, s/p fall Acute on chronic anemia: Likely secondary to bleed from leg wound -s/p 3 unit pRBCs, no significant increase post transfusion, unable to  
-no other source of bleeding identified  
-s/p general surgery consult with bedside debridement 11/10 
-Xray negative for fracture  
-continue antibiotics Acute on chronic renal failure: Multifactorial likely secondary to hypovolemia due to bleed 
-hold diuretics 
-nephrology and Advanced heart failure following   
-continue IVFs Metabolic encephalopathy: persisent 
-likely due to multiple acute issues, consider CT head 
-ammonia negative, ABG checked with pH fine 
  
Hyperkalemia: resolved, due to ROCIO 
-s/p regular insulin plus glucose, calcium gluconate, amp of bicarb 
  
Chronic systolic heart failure NYHA class IV, ischemic cardiomyopathy status post LVAD Hold Bumex due to ROCIO. Continue with Coreg 12.5 mg twice daily. INR stable, will monitor Not on Entresto ACE inhibitor, ARB is due to CKD 
-Advanced heart failure following NSVT: recurrent 
-previously on mexiltine but patient reports dizziness 
-EP consulted. Placed on ranexa Chronic LVAD infection:  
-ID consulted, continues on cefepime 
  
Diabetes mellitus type 2: 
Sliding scale insulin before meals and at bedtime. 
  
COPD: stable-Continue maintenance inhaled steroids. Bronchodilators as needed. 
  
Depression: stable.  Continue Paxil Morbid obesity: Body mass index is 46.34 kg/m².  OP management 
  
Code status: full DVT prophylaxis: theurapeutic INR Care Plan discussed with: Patient/Family Anticipated Disposition: Home w/Family Anticipated Discharge: Greater than 48 hours Hospital Problems  Date Reviewed: 10/28/2020 Codes Class Noted POA Coagulopathy ([facility] ICD-10-CM: G84.4 ICD-9-CM: 286.9  11/10/2020 Unknown Open wound of left lower leg ICD-10-CM: M10.867Y ICD-9-CM: 891.0  11/10/2020 Unknown Anemia ICD-10-CM: D64.9 ICD-9-CM: 285.9  11/9/2020 Unknown ROCIO (acute kidney injury) ([facility] ICD-10-CM: N17.9 ICD-9-CM: 584.9  11/9/2020 Unknown NICM (nonischemic cardiomyopathy) ([facility] ICD-10-CM: I42.8 ICD-9-CM: 425.4  11/4/2020 Yes Chronic venous insufficiency ICD-10-CM: I87.2 ICD-9-CM: 459.81  11/4/2020 Yes Acute on chronic systolic CHF (congestive heart failure) (HCC) ICD-10-CM: G71.33 ICD-9-CM: 428.23, 428.0  10/27/2020 Yes Obesity, morbid ([facility] ICD-10-CM: E66.01 
ICD-9-CM: 278.01  10/1/2020 Yes Review of Systems:  
Negative unless stated above Vital Signs:  
 Last 24hrs VS reviewed since prior progress note. Most recent are: 
Visit Vitals Pulse 73 Temp 98.2 °F (36.8 °C) Resp 20 Wt 135 kg (297 lb 9.9 oz) SpO2 100% BMI 54.44 kg/m² Intake/Output Summary (Last 24 hours) at 11/11/2020 1607 Last data filed at 11/11/2020 8004 Gross per 24 hour Intake 527.9 ml Output 550 ml Net -22.1 ml Physical Examination:  
 
I had a face to face encounter with this patient and independently examined them on 11/11/2020 as outlined below: 
 
     
Constitutional:  awakens to voice, not alert ENT:  Oral mucosa moist, oropharynx benign. Resp:  CTA bilaterally. Limited due to body habitus. No accessory muscle use CV:  LVAD hum, no murmurs, gallops, rubs GI:  Soft, non distended, obese, non tender. normoactive bowel sounds, no hepatosplenomegaly Musculoskeletal:  No edema, left leg wrapped, reviewed open wound pictures Neurologic:  Moves all extremities, random twitches in extremities, more prominent in lower extremities Data Review:  
 Review and/or order of clinical lab test 
Review and/or order of tests in the radiology section of CPT Review and/or order of tests in the medicine section of CPT Labs:  
 
Recent Labs 11/11/20 
1216 11/11/20 
0550 11/10/20 
0195 WBC  --  9.6 10.1 HGB 7.0* 7.4* 7.0*  
HCT 22.6* 23.9* 22.7*  
PLT  --  140* 144* Recent Labs 11/11/20 
1712 11/10/20 
6418 11/10/20 
4888  137 138  
K 4.6 5.0 5.2*  
 102 101 CO2 27 28 28 BUN 98* 94* 92* CREA 4.68* 4.89* 4.96* * 174* 170* CA 8.4* 8.6 9.0 PHOS 5.2*  --   --   
 
Recent Labs 11/11/20 
0549 11/10/20 
0511 11/09/20 
1819 ALT 11* 8* 7* AP 67 69 74 TBILI 0.4 0.3 0.4 TP 6.7 6.7 7.2 ALB 2.8* 2.9* 3.0*  
GLOB 3.9 3.8 4.2* Recent Labs 11/11/20 0549 11/10/20 
0511 11/09/20 
1819 INR 2.3* 2.4* 2.6* PTP 23.6* 24.5* 25.7* APTT  --   --  46.2* No results for input(s): FE, TIBC, PSAT, FERR in the last 72 hours. Lab Results Component Value Date/Time Folate 10.0 10/28/2020 03:56 AM  
  
No results for input(s): PH, PCO2, PO2 in the last 72 hours. No results for input(s): CPK, CKNDX, TROIQ in the last 72 hours. No lab exists for component: CPKMB Lab Results Component Value Date/Time Cholesterol, total 192 10/01/2020 12:00 AM  
 HDL Cholesterol 29 (L) 10/01/2020 12:00 AM  
 LDL Chol Calc (NIH) 123 (H) 10/01/2020 12:00 AM  
 Triglyceride 222 (H) 10/01/2020 12:00 AM  
 
Lab Results Component Value Date/Time Glucose (POC) 195 (H) 11/11/2020 12:05 PM  
 Glucose (POC) 182 (H) 11/11/2020 06:35 AM  
 Glucose (POC) 203 (H) 11/10/2020 10:21 PM  
 Glucose (POC) 192 (H) 11/10/2020 01:50 PM  
 Glucose (POC) 189 (H) 11/10/2020 08:57 AM  
 
Lab Results Component Value Date/Time Color Yellow/Straw 11/09/2020 12:05 AM  
 Appearance Turbid (A) 11/09/2020 12:05 AM  
 Specific gravity 1.013 11/09/2020 12:05 AM  
 pH (UA) 5.0 11/09/2020 12:05 AM  
 Protein 100 (A) 11/09/2020 12:05 AM  
 Glucose Negative 11/09/2020 12:05 AM  
 Ketone Negative 11/09/2020 12:05 AM  
 Bilirubin Negative 11/09/2020 12:05 AM  
 Urobilinogen 0.1 11/09/2020 12:05 AM  
 Nitrites Negative 11/09/2020 12:05 AM  
 Leukocyte Esterase Trace (A) 11/09/2020 12:05 AM  
 Bacteria Negative 11/09/2020 12:05 AM  
 WBC 0-5 11/09/2020 12:05 AM  
 RBC 0-5 11/09/2020 12:05 AM  
 
 
 
Medications Reviewed: Current Facility-Administered Medications Medication Dose Route Frequency  albumin human 25% (BUMINATE) solution 25 g  25 g IntraVENous Q6H  
 0.9% sodium chloride infusion 250 mL  250 mL IntraVENous PRN  
 ranolazine ER (RANEXA) tablet 500 mg  500 mg Oral BID  [Held by provider] carvediloL (COREG) tablet 12.5 mg  12.5 mg Oral BID WITH MEALS  ferrous sulfate tablet 325 mg  325 mg Oral DAILY WITH BREAKFAST  docusate sodium (COLACE) capsule 100 mg  100 mg Oral BID  
 gabapentin (NEURONTIN) capsule 300 mg  300 mg Oral DAILY  hydrOXYzine HCL (ATARAX) tablet 10 mg  10 mg Oral TID PRN  
 PARoxetine (PAXIL) tablet 30 mg  30 mg Oral DAILY  docusate sodium (COLACE) capsule 100 mg  100 mg Oral BID  
 glucose chewable tablet 16 g  4 Tab Oral PRN  
 glucagon (GLUCAGEN) injection 1 mg  1 mg IntraMUSCular PRN  
 dextrose 10% infusion 0-250 mL  0-250 mL IntraVENous PRN  
 insulin lispro (HUMALOG) injection   SubCUTAneous AC&HS  cefepime (MAXIPIME) 2 g in 0.9% sodium chloride (MBP/ADV) 100 mL MBP  2 g IntraVENous Q24H  
 0.9% sodium chloride infusion  75 mL/hr IntraVENous CONTINUOUS  
 0.9% sodium chloride infusion 250 mL  250 mL IntraVENous PRN  
 sodium chloride (NS) flush 5-40 mL  5-40 mL IntraVENous Q8H  
 sodium chloride (NS) flush 5-40 mL  5-40 mL IntraVENous PRN  
 acetaminophen (TYLENOL) tablet 650 mg  650 mg Oral Q6H PRN Or  
 acetaminophen (TYLENOL) suppository 650 mg  650 mg Rectal Q6H PRN  polyethylene glycol (MIRALAX) packet 17 g  17 g Oral DAILY PRN  promethazine (PHENERGAN) tablet 12.5 mg  12.5 mg Oral Q6H PRN Or  
 ondansetron (ZOFRAN) injection 4 mg  4 mg IntraVENous Q6H PRN  
 
______________________________________________________________________ EXPECTED LENGTH OF STAY: 4d 0h 
ACTUAL LENGTH OF STAY:          2 Crystal Pounds, DO

## 2020-11-11 NOTE — ROUTINE PROCESS
TRANSFER - OUT REPORT: 
 
Verbal report [facility] to [facility](unit) for routine progression of care Report consisted of patients Situation, Background, Assessment and  
Recommendations(SBAR). Information from the following report(s) SBAR was reviewed with the receiving nurse. Lines:  
Peripheral IV 11/09/20 Right Antecubital (Active) Site Assessment Clean, dry, & intact 11/10/20 1325 Phlebitis Assessment 0 11/10/20 1325 Infiltration Assessment 0 11/10/20 1325 Dressing Status Clean, dry, & intact 11/10/20 1325 Dressing Type Transparent;Tape 11/10/20 1325 Hub Color/Line Status Pink;Capped 11/10/20 1325 Action Taken Open ports on tubing capped 11/10/20 1325 Alcohol Cap Used Yes 11/10/20 1325 Opportunity for questions and clarification was provided. Patient transported with: 
 [employer]

## 2020-11-11 NOTE — PROGRESS NOTES
[address] TRANSFER - IN REPORT: 
 
Verbal report received from Kadeem(name) on Beryle Baars  being received from ED(unit) for routine progression of care Report consisted of patients Situation, Background, Assessment and  
Recommendations(SBAR). Information from the following report(s) SBAR, Kardex, Intake/Output, MAR, Accordion, Recent Results and Cardiac Rhythm Paced was reviewed with the receiving nurse. Opportunity for questions and clarification was provided. Assessment completed upon patients arrival to unit and care assumed. [facility] Bedside shift change report given to Alfonzo Ramírez (oncoming nurse) by Mao Manzano (offgoing nurse). Report included the following information SBAR, Kardex, Intake/Output, MAR, Accordion, Recent Results and Cardiac Rhythm Paced.

## 2020-11-11 NOTE — PROGRESS NOTES
[address] [facility] [address] [facility] in [place], [place] Heart Failure Inpatient Progress Note Patient name: Chandra Lesch Patient : 1950 Patient MRN: [id number] Date of service: 20 CHIEF COMPLAINT: 
Cellulitis 
  
PLAN: 
Admitted for suspected worsening of pump infection, ROCIO on CKD, and acute blood loss anemia due to traumatic leg injury s/p fall [address] 11/3 shows CVP 14 mmHg, PA 36/18 mmHg, PCWP 15 mmHg, Sal CI 2.4 Continue current device speed increased to 9400 with low speed limit of 9000; intolerant to higher speed due to VT 
PYP strongly suggestive of aTTR amyloidosis; will consider addition of tafamidis as OP (will require patient assistance) Hold Coreg due to hypotension Intolerant of ACEi/ARB/ARNI due to aTTR Intolerant of spironolactone due to hyperkalemia Hold diuretics Renal consult appreciated; continue IVF Hold ASA and warfarin due to acute wound bleeding Goal INR 2-2.5 (2.4 today) Note General Surgery recs to hold [facility] to allow for wound healing - in setting of active infection and increased risk of thrombosis prefer to maintain some level of AC (maybe lower goal?) Transfuse to maintain Hgb > 7; management per Hospitalist  
Antibiotics per ID - consulted; trend LA and PCT, blood cultures Avoid benzos in setting of AMS  
ABG reviewed; placed on NC 2lpm  
DTC consult recs appreciated, HgbA1C -8.4% NPH and SSI per DTC recs Ambulate daily/PT/OT Nutrition consult ordered GI, urogynecology, and pulmonary consults as OP IMPRESSION: 
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 Marian Mehta at [facility]) · C/b delayed extubation due to severe COPD 
· C/b critical illness polyneuropathy · C/b prolonged hospitalization post-LVAD, 55 days · C/b sternal wound infection s/p debridement (by Dr. Marian Mehta) s/p wound vac · C/b sacral ulcer · Would culture positive for Staph aureus, not MRSA · C/b LVAD site drainage, improved S/p CRT-ICD · ICD fired due to electrolyte imbalance () H/o breast cancer () · s/p bilateral mastectomy/chemo and endometrial cancer s/p hysterectomy · Lymphedema of LLE due to cancer treatment Severe COPD with FEV1 50% Depression Atrial fibrillation H/o \"two mini strokes\" S/p fall with hip facture · Right hip hemmiarthroplasty (18) by Dr. Miguel Rivera) · S/p removed hip hardwarare due to pain (4/15/19) COPD severe CKD, stage 3 Hyperkalemia Pulmonary hypertension Cardiac risk factors: · Morbid obesity, Body mass index is 54.44 kg/m². · DM2 insulin dependent · JED on CPAP 
· HL Urinary incontinence, severe · no procedures due to anticoagulation · conservative management with Detrol 4 pills bid Endometrial cancer () HTN 
HL 
aTTR amyloidosis  
  
CARDIAC IMAGING: 
Echo (19) LVEF 20-25%, AV opens 1:1, no AR Echo (18) LVEF 10-%, ramps study done, report in [facility] Echo (18) ramp study done, LVEDD 7.1cm LHC (18) 2 vessel disease with 90% OM, 80% PDA, DSA to PDA branch 
TTE (10/27/20) LVEF 15-20% LVIDd 7.26cm RVIDd 2.82cm Tapse 1.14cm 
  
HEMODYNAMICS: 
RHC not done CPEST not done 6MW not done 
  
OTHER IMAGING: 
EGD/C-scope (19) no active bleeding and polyp removed. 
  
FAMILY HISTORY: 
Mother  76 years, diagnosed with DM, HTN, CAD Father  [de-identified]years old, HTN, CAD, MI 
Positive for DM and heart disease in the family, brother with valve replacement 
  
SOCIAL HISTORY: 
 Never alcohol, never smoked, , lives alone, no illicit drug use, lives in house, ambulatory status indpendedn, children: daughter, occupation retired Lives alone. 
  
ALLERGIES: benzodiazepines and quetiapine fumerate (lethargy, resp failure to both) 
  
HISTORY OF PRESENT ILLNESS: 
Jose Martin Copeland a 78 y. o. female with multiple comorbidities who was transferred from [facility] LV[facility].  She recently had a prolonged hospitalization at [facility] from 10/27/20-11/7/20 for treatment of volume overload, LVAD pump infection, VT, and debility. She was discharged home on 11/7 and shortly thereafter became dizzy and fell, severely injuring her left shin. She was seen in the ER for wound care and discharged home. She continued to complain of weakness, dizziness, fatigue, and altered balance. She was brought via personal vehicle to [facility] ED where she was noted to have significant ROCIO on CKD, acute blood loss anemia, and suspected worsening pump infection. The [facility] has been consulted for assistance with the management of her LVAD.  
  
REVIEW OF SYSTEMS: 
General: Denies fever, fatigue Ear, nose and throat: Denies difficulty hearing, sinus problems, runny nose, post-nasal drip, ringing in ears, mouth sores, loose teeth, ear pain, nosebleeds, sore throate, facial pain or numbess Cardiovascular: see above in the interval history Respiratory: denies cough, wheezing, sputum production, or hemoptysis. Gastrointestinal: Denies heartburn, constipation, intolerance to certain foods, diarrhea, abdominal pain, nausea, vomiting, difficulty swallowing, blood in stool Kidney and bladder: Denies painful urination, frequent urination, or urgency Musculoskeletal: Denies joint pain, muscle weakness, +hand tremors Skin and hair: Denies change in hair loss or increase, breast changes Redness and edema, LLE wound s/p fall, skin breakdown RLE. PHYSICAL EXAM: 
Visit Vitals Pulse 73 Temp 98.2 °F (36.8 °C) Resp 20 Wt 297 lb 9.9 oz (135 kg) SpO2 100% BMI 54.44 kg/m² LVAD Pump Speed (RPM): 9400 Pump Flow (LPM): 6 MAP: 70 
PI (Pulsitility Index): 3.7 Power: 6.2  Test: No 
Back Up  at Bedside & Labeled: Yes Power Module Test: No 
Driveline Site Care: No 
Driveline Dressing: Clean, Dry, and Intact Outpatient: No 
MAP in Therapeutic Range (Outpatient): Yes General: Patient is morbidly obese, lethargic, confused, +myoclonal jerking HEENT: Normocephalic and atraumatic. No scleral icterus. Pupils are equal, round and reactive to light and accomodation. No conjunctival injection. Oropharynx is clear. Neck: Supple. No evidence of thyroid enlargements or lymphadenopathy. JVD: Cannot be appreciated Lungs: Breath sounds are equal and clear bilaterally. No wheezes, rhonchi, or rales. Heart: VAD hum Abdomen: Soft, obese, no mass or tenderness. No organomegaly or hernia. Bowel sounds present. Genitourinary and rectal: deferred Extremities: No cyanosis, clubbing, Redness and edema, skin breakdown RLE; BLE edema 2+, venous stasis, +LLE wound Neurologic: No focal sensory or motor deficits are noted. Grossly intact. Psychiatric: Awake, alert and oriented x 3. Appropriate mood and affect. Skin: Warm, dry, no nodules + petechiae on extremities PAST MEDICAL HISTORY: 
Past Medical History:  
Diagnosis Date  Asthma  Cancer ([facility] breast  
 Cancer ([facility]   
 endometrial  
 Congestive heart failure, unspecified  CRI (chronic renal insufficiency)  Depression  Diabetes ([facility]  Hypertension PAST SURGICAL HISTORY: 
Past Surgical History:  
Procedure Laterality Date  HX HERNIA REPAIR    
 HX HYSTERECTOMY  HX MASTECTOMY FAMILY HISTORY: 
History reviewed. No pertinent family history. SOCIAL HISTORY: 
Social History Socioeconomic History  Marital status:  Spouse name: Not on file  Number of children: Not on file  Years of education: Not on file  Highest education level: Not on file Tobacco Use  Smoking status: Never Smoker  Smokeless tobacco: Never Used Substance and Sexual Activity  Alcohol use: Not Currently LABORATORY RESULTS: 
Labs Latest Ref Rng & Units 11/11/2020 11/11/2020 11/11/2020 11/10/2020 11/10/2020 11/9/2020 11/9/2020 WBC 3.6 - 11.0 K/uL - 9.6 - 10.1 11. 4(H) 11. 8(H) 14. 1(H)  
RBC 3.80 - 5.20 M/uL - 2.68(L) - 2.64(L) 2.44(L) 2.68(L) 3.20(L) Hemoglobin 11.5 - 16.0 g/dL 7. 0(L) 7. 4(L) - 7. 0(L) 6. 3(L) 6. 9(L) 8. 1(L) Hematocrit 35.0 - 47.0 % 22. 6(L) 23. 9(L) - 22. 7(L) 21. 0(L) 23. 1(L) 27. 9(L) MCV 80.0 - 99.0 FL - 89.2 - 86.0 86.1 86.2 87.2 Platelets 209 - 183 K/uL - 140(L) - 144(L) 157 202 183 Lymphocytes 12 - 49 % - - - 11(L) 11(L) 10(L) 8(L) Monocytes 5 - 13 % - - - 7 8 5 6 Eosinophils 0 - 7 % - - - 2 2 1 3 Basophils 0 - 1 % - - - 0 0 0 0 Albumin 3.5 - 5.0 g/dL - - 2. 8(L) - 2. 9(L) 3.0(L) 3. 2(L) Calcium 8.5 - 10.1 MG/DL - - 8. 4(L) 8.6 9.0 8.8 8.8 Glucose 65 - 100 mg/dL - - 171(H) 174(H) 170(H) 192(H) 164(H) BUN 6 - 20 MG/DL - - 98(H) 94(H) 92(H) 90(H) 79(H) Creatinine 0.55 - 1.02 MG/DL - - 4.68(H) 4.89(H) 4.96(H) 4.58(H) 3.46(H) Sodium 136 - 145 mmol/L - - 138 137 138 136 138 Potassium 3.5 - 5.1 mmol/L - - 4.6 5.0 5.2(H) 6. 0(H) 5. 6(H) TSH 0.450 - 4.500 uIU/mL - - - - - - -  
LDH 81 - 246 U/L - - - - - - - Some recent data might be hidden ALLERGY: 
No Known Allergies CURRENT MEDICATIONS: 
 
Current Facility-Administered Medications:  
  albumin human 25% (BUMINATE) solution 25 g, 25 g, IntraVENous, Q6H, Jaspreet Mendoza MD, 25 g at 11/11/20 1235 
  0.9% sodium chloride infusion 250 mL, 250 mL, IntraVENous, PRN, CHARLENE Thibodeaux M, DO 
  ranolazine ER (RANEXA) tablet 500 mg, 500 mg, Oral, BID, Jamila Chang MD, 500 mg at 11/11/20 1604   [Held by provider] carvediloL (COREG) tablet 12.5 mg, 12.5 mg, Oral, BID WITH MEALS, Yobani Gauthier MD, 12.5 mg at 11/10/20 5682   ferrous sulfate tablet 325 mg, 325 mg, Oral, DAILY WITH BREAKFAST, [facility], DO, 325 mg at 11/11/20 0901 
  docusate sodium (COLACE) capsule 100 mg, 100 mg, Oral, BID, [facility] M, DO, 100 mg at 11/10/20 1351 
  gabapentin (NEURONTIN) capsule 300 mg, 300 mg, Oral, DAILY, [facility] M, DO, 300 mg at 11/11/20 0901   hydrOXYzine HCL (ATARAX) tablet 10 mg, 10 mg, Oral, TID PRN, CHARLENE Downs M, DO 
  PARoxetine (PAXIL) tablet 30 mg, 30 mg, Oral, DAILY, Gearld Creeks M, DO, 30 mg at 11/11/20 0901 
  docusate sodium (COLACE) capsule 100 mg, 100 mg, Oral, BID, CHARLENE Ventura M, DO 
  glucose chewable tablet 16 g, 4 Tab, Oral, PRN, Gearld Creeks M, DO 
  glucagon (GLUCAGEN) injection 1 mg, 1 mg, IntraMUSCular, PRN, [facility], DO 
  dextrose 10% infusion 0-250 mL, 0-250 mL, IntraVENous, PRN, Meche Downs,  
  insulin lispro (HUMALOG) injection, , SubCUTAneous, AC&HS, [facility] M, DO, 2 Units at 11/11/20 1236   cefepime (MAXIPIME) 2 g in 0.9% sodium chloride (MBP/ADV) 100 mL MBP, 2 g, IntraVENous, Q24H, Meche Ventura, DO, Last Rate: 200 mL/hr at 11/11/20 0628, 2 g at 11/11/20 0628 
  0.9% sodium chloride infusion, 75 mL/hr, IntraVENous, CONTINUOUS, Jaspreet Mendoza MD 
  0.9% sodium chloride infusion 250 mL, 250 mL, IntraVENous, PRN, Deanna Kessler MD 
  sodium chloride (NS) flush 5-40 mL, 5-40 mL, IntraVENous, Q8H, Yobani Gauthier MD, 10 mL at 11/11/20 8375 
  sodium chloride (NS) flush 5-40 mL, 5-40 mL, IntraVENous, PRN, Yobani Gauthier MD 
  acetaminophen (TYLENOL) tablet 650 mg, 650 mg, Oral, Q6H PRN **OR** acetaminophen (TYLENOL) suppository 650 mg, 650 mg, Rectal, Q6H PRN, Yobani Gauthier MD 
  polyethylene glycol (MIRALAX) packet 17 g, 17 g, Oral, DAILY PRN, Yobani Gauthier MD 
  promethazine (PHENERGAN) tablet 12.5 mg, 12.5 mg, Oral, Q6H PRN **OR** ondansetron (ZOFRAN) injection 4 mg, 4 mg, IntraVENous, Q6H PRN, Vanessa Lujan MD 
 
Thank you for your referral and allowing me to participate in this patient's care. TEA Cohen [address] [place] [address], [address] Phone: [phone] Fax: [phone] PATIENT CARE TEAM: 
Patient Care Team: 
Kofi Jasso NP as PCP - General (Nurse Practitioner) AHF ATTENDING ADDENDUM Patient was seen and examined in person. Data and notes were reviewed. I have discussed and agree with the plan as noted in the NP note above without further additions. Adonis Lanes, MD PhD 
Beatriz Villasenor 5089 [address] [facility]

## 2020-11-11 NOTE — PROGRESS NOTES
0730: Bedside shift change report given to Mo Ventura (oncoming nurse) by Arlet No (offgoing nurse). Report included the following information SBAR and Kardex. 1000: NP Janine Vazquez aware of patient having frequent vtach. Patient asymptomatic. Dr. Cruz Borne to see patient. 1500: hospitalist is aware of patients confusion. Called respiratory to set up trilogy for sleep. Will likely get CT tonight. Continue to monitor. 1930: Bedside shift change report given to Arlet No (oncoming nurse) by Carmelo Polo RN (offgoing nurse). Report included the following information SBAR and Kardex. Problem: Diabetes Self-Management Goal: *Disease process and treatment process Description: Define diabetes and identify own type of diabetes; list 3 options for treating diabetes. Outcome: Progressing Towards Goal 
  
Problem: Falls - Risk of 
Goal: *Absence of Falls Description: Document Geovanny Chopra Fall Risk and appropriate interventions in the flowsheet. Outcome: Progressing Towards Goal 
Note: Fall Risk Interventions: 
  
 
  
 
Medication Interventions: Bed/chair exit alarm, Patient to call before getting OOB Elimination Interventions: Call light in reach History of Falls Interventions: Door open when patient unattended, Bed/chair exit alarm Problem: Pressure Injury - Risk of 
Goal: *Prevention of pressure injury Description: Document Jaime Scale and appropriate interventions in the flowsheet. Outcome: Progressing Towards Goal 
Note: Pressure Injury Interventions: 
Sensory Interventions: Assess need for specialty bed Moisture Interventions: Apply protective barrier, creams and emollients, Internal/External urinary devices Activity Interventions: Increase time out of bed, Pressure redistribution bed/mattress(bed type), PT/OT evaluation Mobility Interventions: HOB 30 degrees or less, Pressure redistribution bed/mattress (bed type), PT/OT evaluation Nutrition Interventions: Document food/fluid/supplement intake

## 2020-11-12 NOTE — PROGRESS NOTES
[facility] VAD/Heart Failure Progress Note Admit Date: 2020 POD:  * No surgery found * Procedure:      
 
 Subjective:  
Mental status improved Objective:  
Vitals: 
Pulse 77, temperature 97 °F (36.1 °C), resp. rate 19, weight 293 lb 3.4 oz (133 kg), SpO2 100 %. Temp (24hrs), Av.2 °F (36.8 °C), Min:97 °F (36.1 °C), Max:98.8 °F (37.1 °C) Hemodynamics: 
 CO:   
 CI:   
 CVP:   
 SVR:   
 PAP Systolic:   
 PAP Diastolic:   
 PVR:   
 PA68:   
 SCV02:   
 
VAD Interrogation: LVAD (Heartmate) Pump Speed (RPM): 9400 Pump Flow (LPM): 6 PI (Pulsitility Index): 4.3 Power: 6.2 MAP: 80  Test: Yes 
Back Up  at Bedside & Labeled: Yes Power Module Test: Yes Driveline Site Care: No 
Driveline Dressing: Clean, Dry, and Intact EKG/Rhythm:   
 
Extubation Date / Time:  
 
CT Output:  
 
Ventilator: 
  
 
Oxygen Therapy: 
Oxygen Therapy O2 Sat (%): 100 % (20) Pulse via Oximetry: 97 beats per minute (11/10/20 1100) O2 Device: Nasal cannula (20) O2 Flow Rate (L/min): 2 l/min (20) CXR: 
 
Admission Weight: Last Weight Weight: 297 lb 8 oz (134.9 kg) Weight: 293 lb 3.4 oz (133 kg) Intake / Output / Drain: 
Current Shift:  0701 -  190 In: 180 [P.O.:180] Out: 250 [Urine:250] Last 24 hrs.:  
 
Intake/Output Summary (Last 24 hours) at 2020 1036 Last data filed at 2020 5457 Gross per 24 hour Intake 501.3 ml Output 750 ml Net -248.7 ml No results for input(s): CPK, CKMB, TROIQ in the last 72 hours. Recent Labs 20 
2456 20 
0520 20 
1216 20 
0550 20 
0549 11/10/20 
5446 NA  --  140  --   --  138 137 K  --  4.6  --   --  4.6 5.0  
CO2  --  26  --   --  27 28 BUN  --  96*  --   --  98* 94* CREA  --  3.88*  --   --  4.68* 4.89* GLU  --  113*  --   --  171* 174* PHOS  --   --   --   --  5.2*  --   
 MG  --  3.3*  --   --   --   --   
WBC 6.5  --   --  9.6  --  10.1 HGB 7.4*  --  7.0* 7.4*  --  7.0*  
HCT 24.3*  --  22.6* 23.9*  --  22.7*  
*  --   --  140*  --  144* Recent Labs 11/12/20 
0544 11/11/20 
0549 11/10/20 
0511 11/09/20 
1819 INR 2.3* 2.3* 2.4* 2.6* PTP 22.7* 23.6* 24.5* 25.7* APTT  --   --   --  46.2* No lab exists for component: PBNP Current Facility-Administered Medications:  
  0.9% sodium chloride infusion, 75 mL/hr, IntraVENous, CONTINUOUS, Jaspreet Mendoza MD, Last Rate: 75 mL/hr at 11/12/20 0856, 75 mL/hr at 11/12/20 0856 
  0.9% sodium chloride infusion 250 mL, 250 mL, IntraVENous, PRN,  [place] M, DO 
  ranolazine ER (RANEXA) tablet 500 mg, 500 mg, Oral, BID, Kirit Guerra MD, 500 mg at 11/12/20 9835   [Held by provider] carvediloL (COREG) tablet 12.5 mg, 12.5 mg, Oral, BID WITH MEALS, Darwin Maddox MD, 12.5 mg at 11/10/20 0609   ferrous sulfate tablet 325 mg, 325 mg, Oral, DAILY WITH BREAKFAST, James KATE [place], 325 mg at 11/12/20 1148   docusate sodium (COLACE) capsule 100 mg, 100 mg, Oral, BID,  [place] M, DO, 100 mg at 11/12/20 0260   gabapentin (NEURONTIN) capsule 300 mg, 300 mg, Oral, DAILY,  [place] M, DO, 300 mg at 11/12/20 2556   hydrOXYzine HCL (ATARAX) tablet 10 mg, 10 mg, Oral, TID PRN,  [place] M, DO 
  PARoxetine (PAXIL) tablet 30 mg, 30 mg, Oral, DAILY,  [place] M, DO, 30 mg at 11/12/20 6901   glucose chewable tablet 16 g, 4 Tab, Oral, PRN, James Orozco M,  
  glucagon (GLUCAGEN) injection 1 mg, 1 mg, IntraMUSCular, PRN, James KATE,  
  dextrose 10% infusion 0-250 mL, 0-250 mL, IntraVENous, PRN, James KATE, DO 
  insulin lispro (HUMALOG) injection, , SubCUTAneous, AC&HS, James KATE, , Stopped at 11/11/20 2200   cefepime (MAXIPIME) 2 g in 0.9% sodium chloride (MBP/ADV) 100 mL MBP, 2 g, IntraVENous, Q24H, Shadi Ventura, , Last Rate: 200 mL/hr at 11/12/20 0630, 2 g at 11/12/20 0630 
  0.9% sodium chloride infusion 250 mL, 250 mL, IntraVENous, PRN, Pete Schmidt MD 
  sodium chloride (NS) flush 5-40 mL, 5-40 mL, IntraVENous, Q8H, Ventura Gerard MD, 10 mL at 11/12/20 5547   sodium chloride (NS) flush 5-40 mL, 5-40 mL, IntraVENous, PRN, Ventura Gerard MD 
  acetaminophen (TYLENOL) tablet 650 mg, 650 mg, Oral, Q6H PRN **OR** acetaminophen (TYLENOL) suppository 650 mg, 650 mg, Rectal, Q6H PRN, Ventura Gerard MD 
  polyethylene glycol (MIRALAX) packet 17 g, 17 g, Oral, DAILY PRN, Ventura Gerard MD 
  promethazine (PHENERGAN) tablet 12.5 mg, 12.5 mg, Oral, Q6H PRN **OR** ondansetron (ZOFRAN) injection 4 mg, 4 mg, IntraVENous, Q6H PRN, Ventura Gerard MD 
 
A/P 
  
S/P LVAD - good flows Need for anticoagulation - coumadin Sternal wound infecion - abx's Bite - abx's  
  
Risk of morbidity and mortality - high Medical decision making - high complexity Signed By: Colin Ramos MD

## 2020-11-12 NOTE — PROGRESS NOTES
Transition of Care Plan RUR 30% Disposition   Return home when stable/TBD Transportation  Daughter Jesica Gann [phone] [place]  [phone]   Will need resumption orders  Patient was receiving  SN-wound care and PT Medicare pt has received, reviewed, and signed 1ST IM letter informing them of their right to appeal the discharge. Signed copy has been placed on pt bedside chart. Cm reviewed CM assessment 11/11/20-- Prior to admission, patient was living independently in her own one level home. Independent with ADL's and IADL's. Has LVAD, Trilogy, rollator, and walker. Daughter, Jesica Gann, [phone] is supportive CM met with daughter, Meño Bradley, in patient's room, patient was sleeping. She confirmed the above-- Meño Bradley lives 2 hours away and just had a baby 4 weeks ago. She is probably going to hire caretakers for patient when discharged. 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  Cm provided her with a list of Private Duty agencies. Meño Bradley is also talking with the home health nurse from [facility] about private duty referrals. Patient has been to [facility] and [address] [place] [address] IPR in the past.  6 Tony Bradley is in agreement if patient is recommended for IPR when discharged. Cm will follow and assist with transition of care needs.

## 2020-11-12 NOTE — PROGRESS NOTES
[address] [facility] [address] [facility] in [place], [place] Heart Failure Inpatient Progress Note Patient name: Chandra Lesch Patient : 1950 Patient MRN: [id number] Date of service: 20 CHIEF COMPLAINT: 
Cellulitis 
  
PLAN: 
Admitted for suspected worsening of pump infection, ROCIO on CKD, and acute blood loss anemia due to traumatic leg injury s/p fall [address] 11/3 shows CVP 14 mmHg, PA 36/18 mmHg, PCWP 15 mmHg, Sal CI 2.4 Continue current device speed increased to 9400 with low speed limit of 9000; intolerant to higher speed due to VT 
PYP strongly suggestive of aTTR amyloidosis; will consider addition of tafamidis as OP (will require patient assistance) Hold Coreg due to hypotension Intolerant of ACEi/ARB/ARNI due to aTTR Intolerant of spironolactone due to hyperkalemia Continue to hold diuretics Renal consult appreciated; continue IVF Resume low dose warfarin tonight; 0.5 mg PO x 1 Goal INR 2-2.5 (2.3 today) Note General Surgery recs to hold [facility] to allow for wound healing - in setting of active infection and increased risk of thrombosis prefer to maintain some level of AC (maybe lower goal?) Check iron profile Transfuse to maintain Hgb > 7; management per Hospitalist  
Antibiotics per ID - consulted; trend LA and PCT, blood cultures Avoid benzos in setting of AMS Worsening respiratory acidosis following admin of O2 in setting of COPD; recommend avoiding supplemental O2 and using Trilogy for respiratory support PRN 
DTC consult recs appreciated, HgbA1C -8.4% NPH and SSI per DTC recs PT/OT when stable Nutrition consult ordered GI, urogynecology, and pulmonary consults as OP IMPRESSION: 
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 Jina Nielsen at [facility]) · C/b delayed extubation due to severe COPD 
· C/b critical illness polyneuropathy · C/b prolonged hospitalization post-LVAD, 55 days · C/b sternal wound infection s/p debridement (by Dr. Jina Nielsen) s/p wound vac · C/b sacral ulcer · Would culture positive for Staph aureus, not MRSA · C/b LVAD site drainage, improved S/p CRT-ICD · ICD fired due to electrolyte imbalance (2011) H/o breast cancer (1992) · s/p bilateral mastectomy/chemo and endometrial cancer s/p hysterectomy · Lymphedema of LLE due to cancer treatment Severe COPD with FEV1 50% Depression Atrial fibrillation H/o \"two mini strokes\" S/p fall with hip facture · Right hip hemmiarthroplasty (5/23/18) by Dr. Armendariz Shock) · S/p removed hip hardwarare due to pain (4/15/19) COPD severe CKD, stage 3 Hyperkalemia Pulmonary hypertension Cardiac risk factors: · Morbid obesity, Body mass index is 53.63 kg/m². · DM2 insulin dependent · JED on CPAP 
· HL Urinary incontinence, severe · no procedures due to anticoagulation · conservative management with Detrol 4 pills bid Endometrial cancer (2002) HTN 
HL 
aTTR amyloidosis Left leg wound following fall Encephalopathy Renal failure  
  
CARDIAC IMAGING: 
Echo (9/24/19) LVEF 20-25%, AV opens 1:1, no AR Echo (5/29/18) LVEF 10-%, ramps study done, report in epic Echo (1/9/18) ramp study done, LVEDD 7.1cm LHC (11/9/18) 2 vessel disease with 90% OM, 80% PDA, DSA to PDA branch 
TTE (10/27/20) LVEF 15-20% LVIDd 7.26cm RVIDd 2.82cm Tapse 1.14cm 
  
HEMODYNAMICS: 
RHC not done CPEST not done 6MW not done 
  
OTHER IMAGING: 
EGD/C-scope (4/17/19) no active bleeding and polyp removed. 
  
 FAMILY HISTORY: 
Mother  76 years, diagnosed with DM, HTN, CAD Father  [de-identified]years old, HTN, CAD, MI 
Positive for DM and heart disease in the family, brother with valve replacement 
  
SOCIAL HISTORY: 
Never alcohol, never smoked, , lives alone, no illicit drug use, lives in house, ambulatory status indpendedn, children: daughter, occupation retired Lives alone. 
  
ALLERGIES: benzodiazepines and quetiapine fumerate (lethargy, resp failure to both) 
  
HISTORY OF PRESENT ILLNESS: 
Chrissy Scot a 78 y. o. female with multiple comorbidities who was transferred from [facility] LVAD program.  She recently had a prolonged hospitalization at [facility] from 10/27/20-20 for treatment of volume overload, LVAD pump infection, VT, and debility. She was discharged home on  and shortly thereafter became dizzy and fell, severely injuring her left shin. She was seen in the ER for wound care and discharged home. She continued to complain of weakness, dizziness, fatigue, and altered balance. She was brought via personal vehicle to [facility] ED where she was noted to have significant ROCIO on CKD, acute blood loss anemia, and suspected worsening pump infection. The [facility] has been consulted for assistance with the management of her LVAD.  
  
REVIEW OF SYSTEMS: 
General: Denies fever, fatigue Ear, nose and throat: Denies difficulty hearing, sinus problems, runny nose, post-nasal drip, ringing in ears, mouth sores, loose teeth, ear pain, nosebleeds, sore throate, facial pain or numbess Cardiovascular: see above in the interval history Respiratory: denies cough, wheezing, sputum production, or hemoptysis. Gastrointestinal: Denies heartburn, constipation, intolerance to certain foods, diarrhea, abdominal pain, nausea, vomiting, difficulty swallowing, blood in stool Kidney and bladder: Denies painful urination, frequent urination, or urgency Musculoskeletal: Denies joint pain, muscle weakness, +hand tremors Skin and hair: Denies change in hair loss or increase, breast changes Redness and edema, LLE wound s/p fall, skin breakdown RLE. PHYSICAL EXAM: 
Visit Vitals Pulse 79 Temp 97.8 °F (36.6 °C) Resp 18 Wt 293 lb 3.4 oz (133 kg) SpO2 95% BMI 53.63 kg/m² LVAD Pump Speed (RPM): 9400 Pump Flow (LPM): 6 MAP: 86 
PI (Pulsitility Index): 4.2 Power: 6.3  Test: No 
Back Up  at Bedside & Labeled: Yes Power Module Test: No 
Driveline Site Care: No 
Driveline Dressing: Clean, Dry, and Intact Outpatient: No 
MAP in Therapeutic Range (Outpatient): Yes General: Patient is morbidly obese, lethargic, confused, +myoclonal jerking HEENT: Normocephalic and atraumatic. No scleral icterus. Pupils are equal, round and reactive to light and accomodation. No conjunctival injection. Oropharynx is clear. Neck: Supple. No evidence of thyroid enlargements or lymphadenopathy. JVD: Cannot be appreciated Lungs: Breath sounds are equal and clear bilaterally. No wheezes, rhonchi, or rales. Heart: VAD hum Abdomen: Soft, obese, no mass or tenderness. No organomegaly or hernia. Bowel sounds present. Genitourinary and rectal: deferred Extremities: No cyanosis, clubbing, Redness and edema, skin breakdown RLE; BLE edema 2+, venous stasis, +LLE wound Neurologic: No focal sensory or motor deficits are noted. Grossly intact. Psychiatric: Awake, alert and oriented x 3. Appropriate mood and affect. Skin: Warm, dry, no nodules + petechiae on extremities PAST MEDICAL HISTORY: 
Past Medical History:  
Diagnosis Date  Asthma  Cancer ([facility] [address] breast  
 Cancer ([facility] [address]   
 endometrial  
 Congestive heart failure, unspecified  CRI (chronic renal insufficiency)  Depression  Diabetes ([facility] [address]  Hypertension PAST SURGICAL HISTORY: 
Past Surgical History:  
Procedure Laterality Date  HX HERNIA REPAIR    
 HX HYSTERECTOMY  HX MASTECTOMY FAMILY HISTORY: 
History reviewed. No pertinent family history. SOCIAL HISTORY: 
Social History Socioeconomic History  Marital status:  Spouse name: Not on file  Number of children: Not on file  Years of education: Not on file  Highest education level: Not on file Tobacco Use  Smoking status: Never Smoker  Smokeless tobacco: Never Used Substance and Sexual Activity  Alcohol use: Not Currently LABORATORY RESULTS: 
Labs Latest Ref Rng & Units 11/12/2020 11/11/2020 11/11/2020 11/11/2020 11/10/2020 11/10/2020 11/9/2020 WBC 3.6 - 11.0 K/uL 6.5 - 9.6 - 10.1 11. 4(H) 11. 8(H)  
RBC 3.80 - 5.20 M/uL 2.68(L) - 2.68(L) - 2.64(L) 2.44(L) 2.68(L) Hemoglobin 11.5 - 16.0 g/dL 7. 4(L) 7. 0(L) 7. 4(L) - 7. 0(L) 6. 3(L) 6. 9(L) Hematocrit 35.0 - 47.0 % 24. 3(L) 22. 6(L) 23. 9(L) - 22. 7(L) 21. 0(L) 23. 1(L) MCV 80.0 - 99.0 FL 90.7 - 89.2 - 86.0 86.1 86.2 Platelets 842 - 506 K/uL 123(L) - 140(L) - 144(L) 157 202 Lymphocytes 12 - 49 % - - - - 11(L) 11(L) 10(L) Monocytes 5 - 13 % - - - - 7 8 5 Eosinophils 0 - 7 % - - - - 2 2 1 Basophils 0 - 1 % - - - - 0 0 0 Albumin 3.5 - 5.0 g/dL 3. 0(L) - - 2. 8(L) - 2. 9(L) 3.0(L) Calcium 8.5 - 10.1 MG/DL 8.7 - - 8. 4(L) 8.6 9.0 8.8 Glucose 65 - 100 mg/dL 113(H) - - 171(H) 174(H) 170(H) 192(H) BUN 6 - 20 MG/DL 96(H) - - 98(H) 94(H) 92(H) 90(H) Creatinine 0.55 - 1.02 MG/DL 3.88(H) - - 4.68(H) 4.89(H) 4.96(H) 4.58(H) Sodium 136 - 145 mmol/L 140 - - 138 137 138 136 Potassium 3.5 - 5.1 mmol/L 4.6 - - 4.6 5.0 5.2(H) 6. 0(H) TSH 0.450 - 4.500 uIU/mL - - - - - - -  
LDH 81 - 246 U/L 266(H) - - - - - - Some recent data might be hidden ALLERGY: 
No Known Allergies CURRENT MEDICATIONS: 
 
Current Facility-Administered Medications:  
  0.9% sodium chloride infusion, 75 mL/hr, IntraVENous, CONTINUOUS, Mahendra Copeland MD, Last Rate: 75 mL/hr at 11/12/20 1241, 75 mL/hr at 11/12/20 1241 
  [START ON 11/13/2020] cefepime (MAXIPIME) 1 g in 0.9% sodium chloride (MBP/ADV) 50 mL MBP, 1 g, IntraVENous, Q24H, Carolina Bills MD 
  0.9% sodium chloride infusion 250 mL, 250 mL, IntraVENous, PRN, Gustabo Mike [employer] M, DO 
  ranolazine ER (RANEXA) tablet 500 mg, 500 mg, Oral, BID, Lady Kodak MD, 500 mg at 11/12/20 8519   [Held by provider] carvediloL (COREG) tablet 12.5 mg, 12.5 mg, Oral, BID WITH MEALS, Freddie Bearden MD, 12.5 mg at 11/10/20 5361   ferrous sulfate tablet 325 mg, 325 mg, Oral, DAILY WITH BREAKFAST, [facility] [place], 325 mg at 11/12/20 4354   docusate sodium (COLACE) capsule 100 mg, 100 mg, Oral, BID, [facility], 100 mg at 11/12/20 4593   gabapentin (NEURONTIN) capsule 300 mg, 300 mg, Oral, DAILY, Fargo Pack , DO, 300 mg at 11/12/20 6152   hydrOXYzine HCL (ATARAX) tablet 10 mg, 10 mg, Oral, TID PRN, Fargo Pack M, DO 
  PARoxetine (PAXIL) tablet 30 mg, 30 mg, Oral, DAILY, [facility], DO, 30 mg at 11/12/20 5362   glucose chewable tablet 16 g, 4 Tab, Oral, PRN, Gustabo Mike, [employer] M, DO 
  glucagon (GLUCAGEN) injection 1 mg, 1 mg, IntraMUSCular, PRN, Fargo Pack , DO 
  dextrose 10% infusion 0-250 mL, 0-250 mL, IntraVENous, PRN, Gustabo Mike [employer] M, DO 
  insulin lispro (HUMALOG) injection, , SubCUTAneous, AC&HS, Fargo Pack M, DO, 2 Units at 11/12/20 1240 
  0.9% sodium chloride infusion 250 mL, 250 mL, IntraVENous, PRN, Rosa M Campa MD 
  sodium chloride (NS) flush 5-40 mL, 5-40 mL, IntraVENous, Q8H, Freddie Bearden MD, 10 mL at 11/12/20 0626   sodium chloride (NS) flush 5-40 mL, 5-40 mL, IntraVENous, PRN, Freddie Bearden MD 
  acetaminophen (TYLENOL) tablet 650 mg, 650 mg, Oral, Q6H PRN **OR** acetaminophen (TYLENOL) suppository 650 mg, 650 mg, Rectal, Q6H PRN, Freddie Bearden MD 
   polyethylene glycol (MIRALAX) packet 17 g, 17 g, Oral, DAILY PRN, Evan Wakefield MD 
  promethazine (PHENERGAN) tablet 12.5 mg, 12.5 mg, Oral, Q6H PRN **OR** ondansetron (ZOFRAN) injection 4 mg, 4 mg, IntraVENous, Q6H PRN, Evan Wakefield MD 
 
Thank you for your referral and allowing me to participate in this patient's care. TEA Epps [address] [place] [address], [address] Phone: [phone] Fax: [phone] PATIENT CARE TEAM: 
Patient Care Team: 
Jeanette Mata NP as PCP - General (Nurse Practitioner) AHF ATTENDING ADDENDUM Patient was seen and examined in person. Data and notes were reviewed. I have discussed and agree with the plan as noted in the NP note above without further additions. Golden Kurtz MD PhD 
Beatriz Villasenor [address] [place] [address]

## 2020-11-12 NOTE — PROGRESS NOTES
0730: Bedside shift change report given to 129 Rosa Sutton (oncoming nurse) by Shaw Parry (offgoing nurse). Report included the following information SBAR, Kardex, Procedure Summary, Intake/Output, MAR, Accordion, Recent Results, and Cardiac Rhythm paced . 1200: Patient had 8 beats of v tach. Additionally, HR previously increased to 130's-140's for aprox 10 seconds. Paged cardiology 1220: Informed cardiology about arrhthymias. No new orders at this time. Will continue to follow up. 
 
1930: Bedside shift change report given to [id number] [facility] (oncoming nurse) by Marley Joyce RN (offgoing nurse). Report included the following information SBAR, Kardex, MAR, Accordion, Recent Results and Cardiac Rhythm paced. Problem: Falls - Risk of 
Goal: *Absence of Falls Description: Document Marcello Noeccasin Fall Risk and appropriate interventions in the flowsheet. 11/12/2020 1001 by Gwendolyn Faulkner Outcome: Progressing Towards Goal 
Note: Fall Risk Interventions: 
Mobility Interventions: Communicate number of staff needed for ambulation/transfer, Patient to call before getting OOB, PT Consult for mobility concerns, Utilize walker, cane, or other assistive device Medication Interventions: Patient to call before getting OOB, Teach patient to arise slowly Elimination Interventions: Call light in reach, Toileting schedule/hourly rounds, Stay With Me (per policy), Patient to call for help with toileting needs History of Falls Interventions: Door open when patient unattended, Room close to nurse's station, Utilize gait belt for transfer/ambulation, Assess for delayed presentation/identification of injury for 48 hrs (comment for end date) 
 
 
11/12/2020 1000 by Gwendolyn Faulkner Outcome: Progressing Towards Goal 
Note: Fall Risk Interventions: 
Mobility Interventions: Communicate number of staff needed for ambulation/transfer, Patient to call before getting OOB, PT Consult for mobility concerns, Utilize walker, cane, or other assistive device Medication Interventions: Patient to call before getting OOB, Teach patient to arise slowly Elimination Interventions: Call light in reach, Toileting schedule/hourly rounds, Stay With Me (per policy), Patient to call for help with toileting needs History of Falls Interventions: Door open when patient unattended, Room close to nurse's station, Utilize gait belt for transfer/ambulation, Assess for delayed presentation/identification of injury for 48 hrs (comment for end date) Problem: Patient Education: Go to Patient Education Activity Goal: Patient/Family Education 11/12/2020 1001 by [facility] Outcome: Progressing Towards Goal 
11/12/2020 1000 by [facility] Outcome: Progressing Towards Goal 
  
Problem: Pressure Injury - Risk of 
Goal: *Prevention of pressure injury Description: Document Jaime Scale and appropriate interventions in the flowsheet. 11/12/2020 1001 by [facility] Outcome: Progressing Towards Goal 
Note: Pressure Injury Interventions: 
Sensory Interventions: Assess need for specialty bed Moisture Interventions: Apply protective barrier, creams and emollients, Limit adult briefs, Maintain skin hydration (lotion/cream), Internal/External urinary devices, Minimize layers, Moisture barrier Activity Interventions: Increase time out of bed, PT/OT evaluation Mobility Interventions: HOB 30 degrees or less, Pressure redistribution bed/mattress (bed type) Nutrition Interventions: Document food/fluid/supplement intake Friction and Shear Interventions: Lift sheet, Minimize layers, Apply protective barrier, creams and emollients 11/12/2020 1000 by [facility] Outcome: Progressing Towards Goal 
Note: Pressure Injury Interventions: 
Sensory Interventions: Assess need for specialty bed Moisture Interventions: Apply protective barrier, creams and emollients, Limit adult briefs, Maintain skin hydration (lotion/cream), Internal/External urinary devices, Minimize layers, Moisture barrier Activity Interventions: Increase time out of bed, PT/OT evaluation Mobility Interventions: HOB 30 degrees or less, Pressure redistribution bed/mattress (bed type) Nutrition Interventions: Document food/fluid/supplement intake Friction and Shear Interventions: Lift sheet, Minimize layers, Apply protective barrier, creams and emollients Problem: Patient Education: Go to Patient Education Activity Goal: Patient/Family Education 11/12/2020 1001 by Waleska Carrasco Outcome: Progressing Towards Goal 
11/12/2020 1000 by Waleska Carrasco Outcome: Progressing Towards Goal 
  
Problem: Discharge Planning Goal: *Discharge to safe environment 11/12/2020 1001 by Waleska Carrasco Outcome: Progressing Towards Goal 
11/12/2020 1000 by Waleska Carrasco Outcome: Progressing Towards Goal 
  
Problem: Heart Failure: Day 4 Goal: Activity/Safety Outcome: Progressing Towards Goal 
Goal: Diagnostic Test/Procedures Outcome: Progressing Towards Goal 
Goal: Nutrition/Diet Outcome: Progressing Towards Goal 
Goal: Discharge Planning Outcome: Progressing Towards Goal 
Goal: Medications Outcome: Progressing Towards Goal 
Goal: Respiratory Outcome: Progressing Towards Goal 
Goal: Treatments/Interventions/Procedures Outcome: Progressing Towards Goal 
Goal: Psychosocial 
Outcome: Progressing Towards Goal 
Goal: *Oxygen saturation within defined limits Outcome: Progressing Towards Goal 
Goal: *Hemodynamically stable Outcome: Progressing Towards Goal 
Goal: *Optimal pain control at patient's stated goal 
Outcome: Progressing Towards Goal 
Goal: *Anxiety reduced or absent Outcome: Progressing Towards Goal 
Goal: *Demonstrates progressive activity Outcome: Progressing Towards Goal

## 2020-11-12 NOTE — PROGRESS NOTES
1930 
Bedside shift change report given to Adiel Meyer (oncoming nurse) by Albania Bonilla (offgoing nurse). Report included the following information SBAR, Kardex, Intake/Output, MAR, Accordion, Recent Results and Cardiac Rhythm Paced. [facility] Bedside shift change report given to Rodrigo Forde (oncoming nurse) by Adiel Meyer (offgoing nurse). Report included the following information SBAR, Kardex, Intake/Output, MAR, Accordion, Recent Results and Cardiac Rhythm Paced.

## 2020-11-12 NOTE — PROGRESS NOTES
[facility] 
 [address] [facility], [address] [facility], [facility] Phone: [phone]  
  
Nephrology Progress Note 
Fam Jordan     1950     [id number] Date of Admission : 11/9/2020 11/12/20 CC: Follow up for ARF Assessment and Plan ROCIO on CKD 
- Possible ATN from IV volume depletion, blood loss. Less likely Post infectious GN   
- No pyuria : less likely AIN. Urine eos: pending - No obstruction on imaging  
- UOP and Cr improving w/ IVF. Urine lytes still pending. Re-ordered today  
- she was uremic on admission w/ encephalopathy, involuntary movements etc...but holding off on HD for now. D/w daughter at length this morning. She is agreeable to dialysis if needed 
- avoid all potential nephrotoxins - daily labs  
  
CKD stage III. -Baseline creatinine prior to last admission was 1.3 mg/dL 
-Creatinine close to 2.0-2.1 when she was euvolemic. 
-Etiology of CKD is likely diabetes hypertension and cardiomyopathy. However she does have a positive PYP testing which raises the possibility of amyloidosis. 
  
Chronic systolic CHF, stage C NYHA class IV 
-Combined ischemic and nonischemic cardiomyopathy 
-S/p HM 2 LVAD implant 4/15/2017 by Dr. Marlyn Pedro at [facility] 
- hx of recurrent VT : off mexiletine 
-Recent acute on chronic RV failure. -Recent sternal wound infection with staph aureus and Morganella. Has LVAD site drainage. 
  
Left Calf laceration Blood loss anemia - per primary team/ surgery Severe COPD. Pulmonary hypertension. Chronic A. fib. History of endometrial answer ATTR amyloidosis 
  
Care Plan discussed with: Pt Interval History: 
Seen and examined Sleeping w/ CPAP now No edema Confusion improving per daughter but she was hallucinating yesterday Has been off Mexiletine and no recurrence of VT  
BUN/ CR trending down UOP improving Review of Systems: A comprehensive review of systems was negative except for that written in the HPI. Current Medications:  
Current Facility-Administered Medications Medication Dose Route Frequency  0.9% sodium chloride infusion  75 mL/hr IntraVENous CONTINUOUS  
 0.9% sodium chloride infusion 250 mL  250 mL IntraVENous PRN  
 ranolazine ER (RANEXA) tablet 500 mg  500 mg Oral BID  [Held by provider] carvediloL (COREG) tablet 12.5 mg  12.5 mg Oral BID WITH MEALS  ferrous sulfate tablet 325 mg  325 mg Oral DAILY WITH BREAKFAST  docusate sodium (COLACE) capsule 100 mg  100 mg Oral BID  
 gabapentin (NEURONTIN) capsule 300 mg  300 mg Oral DAILY  hydrOXYzine HCL (ATARAX) tablet 10 mg  10 mg Oral TID PRN  
 PARoxetine (PAXIL) tablet 30 mg  30 mg Oral DAILY  docusate sodium (COLACE) capsule 100 mg  100 mg Oral BID  
 glucose chewable tablet 16 g  4 Tab Oral PRN  
 glucagon (GLUCAGEN) injection 1 mg  1 mg IntraMUSCular PRN  
 dextrose 10% infusion 0-250 mL  0-250 mL IntraVENous PRN  
 insulin lispro (HUMALOG) injection   SubCUTAneous AC&HS  cefepime (MAXIPIME) 2 g in 0.9% sodium chloride (MBP/ADV) 100 mL MBP  2 g IntraVENous Q24H  
 0.9% sodium chloride infusion 250 mL  250 mL IntraVENous PRN  
 sodium chloride (NS) flush 5-40 mL  5-40 mL IntraVENous Q8H  
 sodium chloride (NS) flush 5-40 mL  5-40 mL IntraVENous PRN  
 acetaminophen (TYLENOL) tablet 650 mg  650 mg Oral Q6H PRN Or  
 acetaminophen (TYLENOL) suppository 650 mg  650 mg Rectal Q6H PRN  polyethylene glycol (MIRALAX) packet 17 g  17 g Oral DAILY PRN  promethazine (PHENERGAN) tablet 12.5 mg  12.5 mg Oral Q6H PRN Or  
 ondansetron (ZOFRAN) injection 4 mg  4 mg IntraVENous Q6H PRN No Known Allergies Objective: 
Vitals:   
Vitals:  
 11/11/20 2030 11/11/20 2340 11/12/20 0435 11/12/20 6152 Pulse:  70 76 Resp:  19 20 Temp:  98.6 °F (37 °C) 97.9 °F (36.6 °C) SpO2: 98% 91% 100% Weight:    133 kg (293 lb 3.4 oz) Intake and Output: 
No intake/output data recorded. 11/10 1901 - 11/12 0700 In: 849.2 [P.O.:200; I.V.:100] Out: 4812 [[facility] hospitalDH:1541] Physical Examination: 
General: Morbidly obese,on CPAP HEENT: PERRL,  ++ Pallor , No Icterus Neck: Supple,no mass palpable Lungs : CTA 
CVS: RRR, VAD sounds Abdomen: Soft, NT, BS + Extremities: Bilateral lower extremity edema 1+, Ace wraps around left lower extremity. Skin: No rash or lesions. MS: No joint swelling, erythema, warmth Neurologic: Confused Psych: Unable to assess [x]    High complexity decision making was performed 
[x]    Patient is at high-risk of decompensation with multiple organ involvement Lab Data Personally Reviewed: I have reviewed all the pertinent labs, microbiology data and radiology studies during assessment. Recent Labs 11/12/20 
0498 11/12/20 
0520 11/11/20 
0549 11/10/20 
0947 11/10/20 
0511 11/09/20 
1819 NA  --  140 138 137 138 136 K  --  4.6 4.6 5.0 5.2* 6.0*  
CL  --  108 103 102 101 100 CO2  --  26 27 28 28 25 GLU  --  113* 171* 174* 170* 192* BUN  --  96* 98* 94* 92* 90* CREA  --  3.88* 4.68* 4.89* 4.96* 4.58* CA  --  8.7 8.4* 8.6 9.0 8.8 MG  --  3.3*  --   --   --   --   
PHOS  --   --  5.2*  --   --   --   
ALB  --  3.0* 2.8*  --  2.9* 3.0* ALT  --  10* 11*  --  8* 7* INR 2.3*  --  2.3*  --  2.4* 2.6* Recent Labs 11/12/20 
0544 11/11/20 
1216 11/11/20 
0550 11/10/20 
0947 11/10/20 
0511 11/09/20 
1819 WBC 6.5  --  9.6 10.1 11.4* 11.8* HGB 7.4* 7.0* 7.4* 7.0* 6.3* 6.9*  
HCT 24.3* 22.6* 23.9* 22.7* 21.0* 23.1*  
*  --  140* 144* 157 202 No results found for: SDES Lab Results Component Value Date/Time  Culture result: NO GROWTH 2 DAYS 11/10/2020 06:42 PM  
 Culture result: No Growth (<1000 cfu/mL) 11/09/2020 12:05 AM  
 Culture result: SCANT Morganella morganii ssp morganii (A) 10/30/2020 11:30 AM  
 Culture result: SCANT Morganella morganii ssp morganii (A) 10/30/2020 11:30 AM  
 Culture result: RARE DIPHTHEROIDS (A) 10/30/2020 11:30 AM  
 
Recent Results (from the past 24 hour(s)) RBC, ALLOCATE Collection Time: 11/11/20 11:00 AM  
Result Value Ref Range HISTORY CHECKED? Historical check performed GLUCOSE, POC Collection Time: 11/11/20 12:05 PM  
Result Value Ref Range Glucose (POC) 195 (H) 65 - 100 mg/dL Performed by [facility] AMMONIA Collection Time: 11/11/20 12:16 PM  
Result Value Ref Range Ammonia 24 <32 UMOL/L  
HGB & HCT Collection Time: 11/11/20 12:16 PM  
Result Value Ref Range HGB 7.0 (L) 11.5 - 16.0 g/dL HCT 22.6 (L) 35.0 - 47.0 % POC EG7 Collection Time: 11/11/20 12:35 PM  
Result Value Ref Range Calcium, ionized (POC) 1.16 1.12 - 1.32 mmol/L  
 pH (POC) 7.38 7.35 - 7.45    
 pCO2 (POC) 49.8 (H) 35.0 - 45.0 MMHG  
 pO2 (POC) 61 (L) 80 - 100 MMHG  
 HCO3 (POC) 29.7 (H) 22 - 26 MMOL/L Base excess (POC) 5 mmol/L  
 sO2 (POC) 90 (L) 92 - 97 % Site RIGHT RADIAL Device: ROOM AIR Allens test (POC) YES Specimen type (POC) ARTERIAL    
GLUCOSE, POC Collection Time: 11/11/20  5:14 PM  
Result Value Ref Range Glucose (POC) 182 (H) 65 - 100 mg/dL Performed by Jordan Townsend Collection Time: 11/11/20  8:26 PM  
Result Value Ref Range Calcium, ionized (POC) 1.18 1.12 - 1.32 mmol/L  
 pH (POC) 7.31 (L) 7.35 - 7.45    
 pCO2 (POC) 61.0 (H) 35.0 - 45.0 MMHG  
 pO2 (POC) 145 (H) 80 - 100 MMHG  
 HCO3 (POC) 30.5 (H) 22 - 26 MMOL/L Base excess (POC) 4 mmol/L  
 sO2 (POC) 99 (H) 92 - 97 % Site LEFT RADIAL Device: NASAL CANNULA Flow rate (POC) 2 L/M Allens test (POC) YES Specimen type (POC) ARTERIAL Total resp. rate 16 GLUCOSE, POC Collection Time: 11/11/20  9:41 PM  
Result Value Ref Range Glucose (POC) 158 (H) 65 - 100 mg/dL Performed by Aarti HAJI PCT METABOLIC PANEL, COMPREHENSIVE Collection Time: 11/12/20  5:20 AM  
Result Value Ref Range Sodium 140 136 - 145 mmol/L Potassium 4.6 3.5 - 5.1 mmol/L Chloride 108 97 - 108 mmol/L  
 CO2 26 21 - 32 mmol/L Anion gap 6 5 - 15 mmol/L Glucose 113 (H) 65 - 100 mg/dL BUN 96 (H) 6 - 20 MG/DL Creatinine 3.88 (H) 0.55 - 1.02 MG/DL  
 BUN/Creatinine ratio 25 (H) 12 - 20 GFR est AA 14 (L) >60 ml/min/1.73m2 GFR est non-AA 12 (L) >60 ml/min/1.73m2 Calcium 8.7 8.5 - 10.1 MG/DL Bilirubin, total 0.5 0.2 - 1.0 MG/DL  
 ALT (SGPT) 10 (L) 12 - 78 U/L  
 AST (SGOT) 46 (H) 15 - 37 U/L Alk. phosphatase 69 45 - 117 U/L Protein, total 6.8 6.4 - 8.2 g/dL Albumin 3.0 (L) 3.5 - 5.0 g/dL Globulin 3.8 2.0 - 4.0 g/dL A-G Ratio 0.8 (L) 1.1 - 2.2 LD Collection Time: 11/12/20  5:20 AM  
Result Value Ref Range  (H) 81 - 246 U/L  
MAGNESIUM Collection Time: 11/12/20  5:20 AM  
Result Value Ref Range Magnesium 3.3 (H) 1.6 - 2.4 mg/dL PROTHROMBIN TIME + INR Collection Time: 11/12/20  5:44 AM  
Result Value Ref Range INR 2.3 (H) 0.9 - 1.1 Prothrombin time 22.7 (H) 9.0 - 11.1 sec CBC W/O DIFF Collection Time: 11/12/20  5:44 AM  
Result Value Ref Range WBC 6.5 3.6 - 11.0 K/uL  
 RBC 2.68 (L) 3.80 - 5.20 M/uL HGB 7.4 (L) 11.5 - 16.0 g/dL HCT 24.3 (L) 35.0 - 47.0 % MCV 90.7 80.0 - 99.0 FL  
 MCH 27.6 26.0 - 34.0 PG  
 MCHC 30.5 30.0 - 36.5 g/dL  
 RDW 17.1 (H) 11.5 - 14.5 % PLATELET 948 (L) 188 - 400 K/uL MPV 10.2 8.9 - 12.9 FL  
 NRBC 0.0 0  WBC ABSOLUTE NRBC 0.00 0.00 - 0.01 K/uL HAPTOGLOBIN Collection Time: 11/12/20  5:44 AM  
Result Value Ref Range Haptoglobin 180 30 - 200 mg/dL GLUCOSE, POC Collection Time: 11/12/20  7:04 AM  
Result Value Ref Range Glucose (POC) 134 (H) 65 - 100 mg/dL Performed by [facility] [place] Total time spent with patient:  xxx   min. Care Plan discussed with: 
Patient Family RN Consulting Physician Juana Bonilla     
 
 I have reviewed the flowsheets. Chart and Pertinent Notes have been reviewed. No change in PMH ,family and social history from Consult note.  
 
 
Asher Williamson MD

## 2020-11-12 NOTE — PROGRESS NOTES
[facility] Progress Note REFERRING PROVIDER: Dr Lisa Morin Subjective:  
  
Marina Figueroa is a 78 y.o. patient who is seen for evaluation/management of VT & mexiletine intolerance. She was readmitted on 11/09/2020 after a fall, suffered leg wound bleeding. She had been discharged on Levaquin, still being treated for leg cellulitis (Morganella morganii), has renal failure, advanced systolic CHF, & anemia (Hgb 7.4 today). She had started mexiletine on 11/02/2020 during a recent admission for control of recurrent VT. Her daughter then reported that she was unsteady, fatigued, nauseated, & shaky with Levaquin & mexiletine. Antibiotic changed to cefepime IV. Stopped mexiletine this admission, started ranolazine 500 mg po bid on 11/11/2020. No recurrent VT noted overnight on telemetry. Atrial & ventricular pacing.   
  
RV & LV failure, is s/p LVAD in 2017 at [facility].  NYHA III chronic systolic CHF. On appropriate GDMT. 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 
  
[place] CMP.LY AV dual chamber AICD had been checked earlier this month, showed proper function. Pacing rate 70 bpm to suppress PVCs. Anticoagulated with warfarin, but recommendation of general surgery to hold to allow for leg wound healing. [facility] has lowered target INR to 2-2.5 (2.3 today). 
 
   
Previous: 
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 Echo (11/02/2020): LVEF 15-20%, LVAD in place. Mildly dilated RV with mildly reduced RVEF. Mildly dilated LA. Aortic valve sclerosis without stenosis. 
  
Treated for abscess of sternal wound since late 2019. 
  
[facility] (11/09/2018): 2 vessel disease with 90% OM, 80% PDA, DSA to PDA branch 
  
S/p PCI approx 2017. Reports history of Plavix resistance. 
  
[facility] (08/2016): High grade ramus & small PDA disease, borderline disease of LAD & takeoff of pRCA.  
  
 Previously followed by [facility], now sees Dr. Raina Weeks. 
  
Mother with CAD,  age 76. Father  age [de-identified], CAD & MI.  Brother with valve replacement. 
  
 
Patient Active Problem List  
Diagnosis Code  Obesity, morbid ([facility] E66.01  
 Acute on chronic systolic CHF (congestive heart failure) ([facility]) I50.23  
 NICM (nonischemic cardiomyopathy) ([facility] I42.8  Coronary artery disease involving native coronary artery of native heart without angina pectoris I25.10  
 JED on CPAP G47.33, Z99.89  Chronic venous insufficiency I87.2  Ulcer of right foot, limited to breakdown of skin ([facility]) L97.521  
 NSVT (nonsustained ventricular tachycardia) ([facility]) I47.2  Anemia D64.9  ROCIO (acute kidney injury) ([facility] N17.9  Coagulopathy ([facility] D68.9  
 Open wound of left lower leg S81.802A Current Facility-Administered Medications Medication Dose Route Frequency Provider Last Rate Last Dose  
 0.9% sodium chloride infusion 250 mL  250 mL IntraVENous PRN Melvi KATE DO      
 ranolazine ER (RANEXA) tablet 500 mg  500 mg Oral BID Shama Nova MD   500 mg at 20 1604  [Held by provider] carvediloL (COREG) tablet 12.5 mg  12.5 mg Oral BID WITH MEALS Danette Walsh MD   12.5 mg at 11/10/20 2651  ferrous sulfate tablet 325 mg  325 mg Oral DAILY WITH BREAKFAST Melvi KATE [place]   325 mg at 20 0901  
 docusate sodium (COLACE) capsule 100 mg  100 mg Oral BID Melvi KATE DO   100 mg at 11/10/20 1351  
 gabapentin (NEURONTIN) capsule 300 mg  300 mg Oral DAILY Melvi KATE DO   300 mg at 20 7316  hydrOXYzine HCL (ATARAX) tablet 10 mg  10 mg Oral TID PRN Melvi KATE DO      
 PARoxetine (PAXIL) tablet 30 mg  30 mg Oral DAILY Melvi KATE DO   30 mg at 20 0901  
 docusate sodium (COLACE) capsule 100 mg  100 mg Oral BID Melvi KATE DO      
 glucose chewable tablet 16 g  4 Tab Oral PRN Lazarus Shropshire, DO      
  glucagon (GLUCAGEN) injection 1 mg  1 mg IntraMUSCular PRN Rosalba Cousin M, DO      
 dextrose 10% infusion 0-250 mL  0-250 mL IntraVENous PRN Rosalba Cousin M, DO      
 insulin lispro (HUMALOG) injection   SubCUTAneous AC&HS Rosalba Cousin M, DO   Stopped at 11/11/20 2200  cefepime (MAXIPIME) 2 g in 0.9% sodium chloride (MBP/ADV) 100 mL MBP  2 g IntraVENous Q24H Meche Wheeler,  mL/hr at 11/12/20 0630 2 g at 11/12/20 0630  
 0.9% sodium chloride infusion 250 mL  250 mL IntraVENous PRN Janelle Philip MD      
 sodium chloride (NS) flush 5-40 mL  5-40 mL IntraVENous Q8H Madeleine Cisneros MD   10 mL at 11/12/20 9338  sodium chloride (NS) flush 5-40 mL  5-40 mL IntraVENous PRN Madeleine Cisneros MD      
 acetaminophen (TYLENOL) tablet 650 mg  650 mg Oral Q6H PRN Madeleine Cisneros MD      
 Or  
 acetaminophen (TYLENOL) suppository 650 mg  650 mg Rectal Q6H PRN Madeleine Cisneros MD      
 polyethylene glycol (MIRALAX) packet 17 g  17 g Oral DAILY PRN Madeleine Cisneros MD      
 promethazine (PHENERGAN) tablet 12.5 mg  12.5 mg Oral Q6H PRN Madeeline Cisneros MD      
 Or  
 ondansetron [facility]) injection 4 mg  4 mg IntraVENous Q6H PRN Madeleine Cisneros MD      
 
No Known Allergies Past Medical History:  
Diagnosis Date  Asthma  Cancer ([facility] breast  
 Cancer ([facility]   
 endometrial  
 Congestive heart failure, unspecified  CRI (chronic renal insufficiency)  Depression  Diabetes ([facility]  Hypertension Past Surgical History:  
Procedure Laterality Date  HX HERNIA REPAIR    
 HX HYSTERECTOMY  HX MASTECTOMY See HPI for family history. Social History Tobacco Use  Smoking status: Never Smoker  Smokeless tobacco: Never Used Substance Use Topics  Alcohol use: Not Currently Review of Systems: All other review of systems otherwise negative. Constitutional: Negative for fever, chills, weight loss, + malaise/fatigue. HEENT: Negative for nosebleeds, vision changes. Respiratory: Negative for cough, hemoptysis Cardiovascular: Negative for chest pain, palpitations, orthopnea, claudication, + leg swelling, no syncope, and PND. Gastrointestinal: Negative for nausea, vomiting, diarrhea, blood in stool and melena. Genitourinary: Negative for dysuria, and hematuria. Musculoskeletal: Negative for myalgias, + arthralgia. + involuntary muscle jerking Skin: Negative for rash. + left leg wound. Heme: + bleed or bruise easily. Neurological: Negative for speech change and + general weakness Objective: LVAD Visit Vitals Pulse 76 Temp 97.9 °F (36.6 °C) Resp 20 Wt 293 lb 3.4 oz (133 kg) SpO2 100% BMI 53.63 kg/m² Physical Exam:  
Constitutional: Well-developed and well-nourished. No respiratory distress. Head: Normocephalic and atraumatic. Eyes: Pupils are equal, round ENT: Hearing grossly normal 
Neck: Supple. No JVD present. Cardiovascular: LVAD sounds. Pulmonary/Chest: Effort normal and breath sounds normal. No wheezes. Abdominal: Morbidly obese Musculoskeletal: Moves extremities independently. Neurological: Alert,oriented. Skin: Skin is warm and dry. Dressings to leg wounds. Psychiatric: Normal mood and affect. Behavior is normal. Judgment and thought content normal.   
 
CMP:  
Lab Results Component Value Date/Time  11/12/2020 05:20 AM  
 K 4.6 11/12/2020 05:20 AM  
  11/12/2020 05:20 AM  
 CO2 26 11/12/2020 05:20 AM  
 AGAP 6 11/12/2020 05:20 AM  
  (H) 11/12/2020 05:20 AM  
 BUN 96 (H) 11/12/2020 05:20 AM  
 CREA 3.88 (H) 11/12/2020 05:20 AM  
 GFRAA 14 (L) 11/12/2020 05:20 AM  
 GFRNA 12 (L) 11/12/2020 05:20 AM  
 CA 8.7 11/12/2020 05:20 AM  
 ALB 3.0 (L) 11/12/2020 05:20 AM  
 TP 6.8 11/12/2020 05:20 AM  
 GLOB 3.8 11/12/2020 05:20 AM  
 AGRAT 0.8 (L) 11/12/2020 05:20 AM  
 ALT 10 (L) 11/12/2020 05:20 AM  
 
CBC:  
Lab Results Component Value Date/Time WBC 6.5 11/12/2020 05:44 AM  
 HGB 7.4 (L) 11/12/2020 05:44 AM  
 HCT 24.3 (L) 11/12/2020 05:44 AM  
  (L) 11/12/2020 05:44 AM  
  
COAGS:  
Lab Results Component Value Date/Time PTP 22.7 (H) 11/12/2020 05:44 AM  
 INR 2.3 (H) 11/12/2020 05:44 AM  
 
 
Assessment/Plan: ICD-10-CM ICD-9-CM 1. Acute encephalopathy  G93.40 348.30   
2. Blood loss anemia  D50.0 280.0 3. Acute renal failure, unspecified acute renal failure type ([facility]  N17.9 584.9 4. Open wound of left lower leg, initial encounter  S81.802A 891.0   
  891.0   
5 Acute on chronic systolic CHF (congestive heart failure) ([facility])  I50.23 428.23   
  428.0   
6       VT with dizziness Ms. Nina Tran has RV & LV failure, is s/p LVAD in 2017 at [facility].  NYHA III chronic systolic CHF. No ACEi/ARB due to CKD, but otherwise on appropriate GDMT. 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 Previously noted nausea with mexiletine for recurrent VT, now with questionable neurologic side effect (involuntary muscle jerking). Mexiletine discontinued. Not a good candidate for amiodarone. Ablation is not possible given high risk with infection, LVAD, morbid obesity, severe anemia, & JED. No recurrent VT noted since switching to ranolazine 500 mg po bid (off label use). Continue at current dose for now, could increase to 1000 mg po bid if current dose is not effective. Atrial & biventricular pacing, has [address]; recent check showed proper function.  Yue Myers, DAI [address] 11/12/20 Addendum from EP attending: 
 I have seen, examined patient, and discussed with nurse practitioner, registered nurse, reviewed, updated note and agree with the assessment and plan I have talked to her this am. She is feeling fine, no concerns, her daughter thinks medication works since she has not had more NSVT Vital signs are stable LVAD 
 Exam shows regular rhythm and LVAD sound Morbidly obese Leg dressing wrapped Assessment and Plan: 
Continue to monitor VT and effectiveness of ranexa. It is used off label because there are no other alternative meds for her It is probably too early to tell if it works Continue low dose today Thank you for involving me in this patient's care and please call with further concerns or questions. Real Kirby M.D. Electrophysiology/Cardiology [facility] [address], [address] [facility], [address] [place] [address]                               
[phone]

## 2020-11-12 NOTE — PROGRESS NOTES
ID Progress Note 2020 Subjective:  
 
Open eyes, follow commends. On Trilogy machine Review of Systems: 
         
Symptom Y/N Comments   Symptom Y/N Comments Fever/Chills       Chest Pain Poor Appetite       Edema Cough       Abdominal Pain Sputum       Joint Pain SOB/HAIRSTON       Pruritis/Rash Nausea/vomit       Tolerating PT/OT Diarrhea       Tolerating Diet Constipation       Other Could NOT obtain due to: On trilogy, not talkative Objective:  
 
Vitals:  
Visit Vitals Pulse 76 Temp 97.9 °F (36.6 °C) Resp 20 Wt 135 kg (297 lb 9.9 oz) SpO2 100% BMI 54.44 kg/m² Tmax:  Temp (24hrs), Av.3 °F (36.8 °C), Min:97.9 °F (36.6 °C), Max:98.8 °F (37.1 °C) PHYSICAL EXAM: 
General: Chronically ill appearing. WD, WN. Alert, cooperative, no acute distress EENT:  EOMI. Anicteric sclerae. MMM Resp:  Clear in apex with decreased breath sounds at bases, no wheezing or rales. No accessory muscle use CV:  Regular  rhythm,  1+ edema GI:  Soft, distended, Non tender. +Bowel sounds Neurologic:  Alert, follows commends Psych:   Difficult to assess insight this morning. Not anxious nor agitated Skin:  No rashes. No jaundice Sternal wound; base of q-tip size, scabbed over, no drainage Right anterior lower extremity wound; no exudate, very mild erythema, flat,healing Left anterior low extremity wound; 
                         Sternal wound; base of q-tip size, scabbed over, no drainage Right anterior lower extremity wound; no exudate, very mild erythema, flat,healing Left anterior low extremity wound; 
 
Labs:  
Lab Results Component Value Date/Time  WBC 9.6 2020 05:50 AM  
 HGB 7.0 (L) 2020 12:16 PM  
 HCT 22.6 (L) 2020 12:16 PM  
 PLATELET 725 (L)  05:50 AM  
 MCV 89.2 11/11/2020 05:50 AM  
 
Lab Results Component Value Date/Time Sodium 138 11/11/2020 05:49 AM  
 Potassium 4.6 11/11/2020 05:49 AM  
 Chloride 103 11/11/2020 05:49 AM  
 CO2 27 11/11/2020 05:49 AM  
 Anion gap 8 11/11/2020 05:49 AM  
 Glucose 171 (H) 11/11/2020 05:49 AM  
 BUN 98 (H) 11/11/2020 05:49 AM  
 Creatinine 4.68 (H) 11/11/2020 05:49 AM  
 BUN/Creatinine ratio 21 (H) 11/11/2020 05:49 AM  
 GFR est AA 11 (L) 11/11/2020 05:49 AM  
 GFR est non-AA 9 (L) 11/11/2020 05:49 AM  
 Calcium 8.4 (L) 11/11/2020 05:49 AM  
 Bilirubin, total 0.4 11/11/2020 05:49 AM  
 Alk. phosphatase 67 11/11/2020 05:49 AM  
 Protein, total 6.7 11/11/2020 05:49 AM  
 Albumin 2.8 (L) 11/11/2020 05:49 AM  
 Globulin 3.9 11/11/2020 05:49 AM  
 A-G Ratio 0.7 (L) 11/11/2020 05:49 AM  
 ALT (SGPT) 11 (L) 11/11/2020 05:49 AM  
 
 CT of ABD/PEL: Minimal subcutaneous fat changes right lateral abdominal wall. This may 
represent benign dependent changes if the patient lays on her right. A mild 
contusion could also give this appearance Assessment and Plan Metabolic encephalopathy (mild improvement this morning) - Head CT: No acute abnormality identified. Incidentally noted is a 1.1 cm meningioma. Sternal wound, lower extremity infection Venous stasis 
- normal WBC, afebrile. Urine cx (-) Blood cx (11/10) no growth so far CXR (-) Debridement done at bedside on 11/10 by Dr. Francine Hurtado, may need further surgical debridement later. D/w with Dr. Francine Hurtado; not concerned with infection in left anterior lower extremity wound at this time. Continue with IV cefepime Pt will resume keflex upon discharge as suppressive therapy 
  
LVAD/ICD Acute on chronic systolic heart failure - cardiology following, s/p cardiac cath 11/3 
  EF 15-20% 
  
Type II DM with neuropathy - A1C 8.4. continue with insulin therapy 
  Continue with Neurontin 
  
ROCIO on CKD 
- creat 4.6->3.8 Avoid nephrotoxic agents, nephrology following Oly Anna, NP

## 2020-11-12 NOTE — PROGRESS NOTES
Day #1 of Cefepime Indication:  SSTI Current regimen:  2 grams Q24hr Recent Labs 20 
7902 20 
0520 20 
0550 20 
0549 11/10/20 
3105 WBC 6.5  --  9.6  --  10.1 CREA  --  3.88*  --  4.68* 4.89* BUN  --  96*  --  98* 94* Est CrCl: <20 ml/min; Temp (24hrs), Av.1 °F (36.7 °C), Min:97 °F (36.1 °C), Max:98.8 °F (37.1 °C) Plan: Change to 1 gram Q 24hr per renal dosing protocol

## 2020-11-13 NOTE — PROGRESS NOTES
ID Progress Note 2020 Subjective:  
 
Sitting up in the chair, denies any discomfort. Review of Systems: 
         
Symptom Y/N Comments   Symptom Y/N Comments Fever/Chills  n     Chest Pain n      
Poor Appetite       Edema Cough       Abdominal Pain Sputum       Joint Pain y      
SOB/HAIRSTON  n     Pruritis/Rash  y     
Nausea/vomit  n     Tolerating PT/OT Diarrhea  n     Tolerating Diet Constipation  n     Other Could NOT obtain due to:   
 
Objective:  
 
Vitals:  
Visit Vitals Pulse 74 Temp 98.6 °F (37 °C) Resp 18 Wt 130.3 kg (287 lb 4.8 oz) SpO2 97% BMI 52.55 kg/m² Tmax:  Temp (24hrs), Av.2 °F (36.8 °C), Min:97 °F (36.1 °C), Max:98.7 °F (37.1 °C) PHYSICAL EXAM: 
General: Chronically ill appearing. WD, WN. Alert, cooperative, no acute distress EENT:  EOMI. Anicteric sclerae. MMM Resp:  Clear in apex with decreased breath sounds at bases, no wheezing or rales. No accessory muscle use CV:  Regular  rhythm,  1+ edema GI:  Soft, distended, Non tender. +Bowel sounds Neurologic:  Alert and orient x 4 Psych:   Fair insight. Not anxious nor agitated Skin:  No rashes. No jaundice Sternal wound; base of q-tip size, scabbed over, no drainage Right anterior lower extremity wound; dressing dry and itnact Left anterior low extremity wound; dressing dry and intact Sternal wound; base of q-tip size, scabbed over, no drainage Labs:  
Lab Results Component Value Date/Time WBC 6.7 2020 05:13 AM  
 HGB 7.8 (L) 2020 05:13 AM  
 HCT 25.1 (L) 2020 05:13 AM  
 PLATELET 115 (L)  05:13 AM  
 MCV 89.6 2020 05:13 AM  
 
Lab Results Component Value Date/Time  Sodium 139 2020 05:11 AM  
 Potassium 4.6 2020 05:11 AM  
 Chloride 108 2020 05:11 AM  
 CO2 25 11/13/2020 05:11 AM  
 Anion gap 6 11/13/2020 05:11 AM  
 Glucose 213 (H) 11/13/2020 05:11 AM  
 BUN 75 (H) 11/13/2020 05:11 AM  
 Creatinine 3.08 (H) 11/13/2020 05:11 AM  
 BUN/Creatinine ratio 24 (H) 11/13/2020 05:11 AM  
 GFR est AA 18 (L) 11/13/2020 05:11 AM  
 GFR est non-AA 15 (L) 11/13/2020 05:11 AM  
 Calcium 8.4 (L) 11/13/2020 05:11 AM  
 Bilirubin, total 0.4 11/13/2020 05:11 AM  
 Alk. phosphatase 74 11/13/2020 05:11 AM  
 Protein, total 6.7 11/13/2020 05:11 AM  
 Albumin 2.8 (L) 11/13/2020 05:11 AM  
 Globulin 3.9 11/13/2020 05:11 AM  
 A-G Ratio 0.7 (L) 11/13/2020 05:11 AM  
 ALT (SGPT) 10 (L) 11/13/2020 05:11 AM  
 
 CT of ABD/PEL: Minimal subcutaneous fat changes right lateral abdominal wall. This may 
represent benign dependent changes if the patient lays on her right. A mild 
contusion could also give this appearance Assessment and Plan Metabolic encephalopathy (resolved) - Head CT: No acute abnormality identified. Incidentally noted is a 1.1 cm meningioma. Pt is back to herself. Sternal wound, lower extremity infection Venous stasis 
- normal WBC, afebrile. Urine cx (-) Blood cx (11/10) no growth so far CXR (-) Debridement done at bedside on 11/10 by Dr. Brittany Levy, may need further surgical debridement later, but not concerned with infection in left anterior lower extremity wound at this time. Continue with IV cefepime while she is hospitalized. No concerns of active infection at this time Please resume keflex upon discharge as suppressive therapy 
  
LVAD/ICD Acute on chronic systolic heart failure - cardiology following, s/p cardiac cath 11/3 
  EF 15-20% 
  
Type II DM with neuropathy - A1C 8.4. continue with insulin therapy 
  Continue with Neurontin 
  
ROCIO on CKD 
- creat 4.6->3.0 Avoid nephrotoxic agents, nephrology following Pt is clear to be discharge in ID stand point. Recommend to continue with life long Keflex as suppressive therapy. Group will see prn this weekend, call if issues arise.  
 
 
Oly Florez Clarity, NP

## 2020-11-13 NOTE — PROGRESS NOTES
Problem: Self Care Deficits Care Plan (Adult) Goal: *Acute Goals and Plan of Care (Insert Text) Description: FUNCTIONAL STATUS PRIOR TO ADMISSION: Patient was modified independent using a rollator for functional mobility. Patient with recent hospital admission with discharge home however returned s/p GLF with left leg wound. HOME SUPPORT: The patient lived alone with daughter to provide assistance occasionally, however daughter is caring for her baby and is looking into hiring caregivers for patient per  note. Occupational Therapy Goals Initiated 11/13/2020 1. Patient will perform ADLs standing 5 mins without fatigue or LOB with contact guard assistance within 5 day(s). 2.  Patient will perform lower body ADLs with supervision/set-up within 5 day(s) using AE PRN. 3.  Patient will perform sponge bathing x supervision/set-up within 5 day(s). 4.  Patient will perform toilet transfers with contact guard assistance within 5 day(s). 5.  Patient will perform all aspects of toileting with contact guard assistance within 7 day(s). 6.  Patient will complete LVAD switchover from wall <> portable battery pack with independence within 7 day(s)  
  
11/13/2020 0959 by Velvet Coma Outcome: Not Met OCCUPATIONAL THERAPY EVALUATION Patient: Kai Ross (87 y.o. female) Date: 11/13/2020 Primary Diagnosis: Anemia [D64.9] ROCIO (acute kidney injury) ([facility] [address] [N17.9] Precautions: Fall ASSESSMENT Based on the objective data described below, the patient presents with generalized weakness, decreased endurance (episodes of VTACH in session 118-125 bpm seated EOB), decreased insight into deficits, decreased safety awareness, and with newly developed involuntary jerky and spontaneous body movements. Patient was recently hospitalized at [facility] in late October and was discharged home.  This admission, patient returned s/p GLF at home in which she reports she tripped (unaware of details of fall) and must have fallen on her rollator, resulting in a left leg wound. Noted patient also with large bruise on midback. OT initiated LVAD switchover from wall power <> portable battery power with patient repeatedly dropping batteries on the floor due to involuntary jerky movements. Patient required verbal cues for sequencing through LVAD switchover as she attempted to pull out batteries prior to switching over her leads. Patient also with lack of awareness to driveline positioning and  hanging from her abdomen, requiring OT support to manage for safety. Patient also noted to have HAIRSTON during session and unable to utilize rollator for functional mobility to the bathroom at this time due to involuntary movements. Patient returned to bed with bed pan due to safety concerns. Current Level of Function Impacting Discharge (ADLs/self-care): MIN A-MOD A for UB and LB ADLs at bed level due to safety concerns with functional mobility at this time Functional Outcome Measure: The patient scored 30/100 on the Barthel Index outcome measure which is indicative of 70% ADL impairment. Other factors to consider for discharge: LVAD management; patient lives alone; high fall risk due to new involuntary movements; HAIRSTON with episodes of VTACH in session Patient will benefit from skilled therapy intervention to address the above noted impairments. PLAN : 
Recommendations and Planned Interventions: self care training, functional mobility training, therapeutic exercise, balance training, therapeutic activities, endurance activities, patient education, home safety training, and family training/education Frequency/Duration: Patient will be followed by occupational therapy 5 times a week to address goals. Recommendation for discharge: (in order for the patient to meet his/her long term goals) Therapy 3 hours per day 5-7 days per week This discharge recommendation: Has been made in collaboration with the attending provider and/or case management IF patient discharges home will need the following DME: patient has all necessary DME, however will need assistance with all ADL tasks at bed level due to involuntary movements at this time SUBJECTIVE:  
Patient stated I am very particular about that (patient stated regarding location of LVAD batteries and clips).  
 
OBJECTIVE DATA SUMMARY:  
HISTORY:  
Past Medical History:  
Diagnosis Date Asthma Cancer ([facility] [address] breast  
 Cancer ([facility] [address]   
 endometrial  
 Congestive heart failure, unspecified CRI (chronic renal insufficiency) Depression Diabetes ([facility] [address] Hypertension Past Surgical History:  
Procedure Laterality Date HX HERNIA REPAIR    
 HX HYSTERECTOMY    
 HX MASTECTOMY Expanded or extensive additional review of patient history:  
 
Home Situation Home Environment: Private residence # Steps to Enter: 1 One/Two Story Residence: One story Living Alone: Yes Support Systems: Child(ivania)(daughter nearby) Patient Expects to be Discharged to[de-identified] Private residence Current DME Used/Available at Home: Walker, rollator, Raised toilet seat, Shower chair, Grab bars(LVAD; trilogy) Tub or Shower Type: Shower EXAMINATION OF PERFORMANCE DEFICITS: 
Cognitive/Behavioral Status: 
Neurologic State: Alert Orientation Level: Appropriate for age Cognition: Follows commands;Poor safety awareness;Decreased attention/concentration; Impaired decision making Perception: Appears intact Perseveration: No perseveration noted Safety/Judgement: Decreased insight into deficits; Decreased awareness of need for safety;Decreased awareness of need for assistance Skin: bandage on LLE; large bruise mid back Edema: none noted Hearing: Auditory Auditory Impairment: None Range of Motion: 
BUE3 
AROM: Generally decreased, functional 
  
  
  
  
  
  
  
 
Strength: BUE 
 Strength: Generally decreased, functional 
  
  
  
  
 
Coordination: 
Coordination: Generally decreased, functional(involuntary jerky movements of all extremities) Fine Motor Skills-Upper: Left Intact; Right Intact Gross Motor Skills-Upper: Left Intact; Right Intact Tone & Sensation: BUE Tone: Normal 
  
  
  
  
  
  
  
 
Balance: 
Sitting: Impaired; Without support Sitting - Static: Good (unsupported) Sitting - Dynamic: Fair (occasional)(due to involuntary spastic movements) Standing: Impaired; Without support Standing - Static: Fair;Constant support Standing - Dynamic : Fair;Constant support Functional Mobility and Transfers for ADLs: 
Bed Mobility: 
Supine to Sit: Stand-by assistance Sit to Supine: Contact guard assistance Scooting: Stand-by assistance Transfers: 
Sit to Stand: Minimum assistance Stand to Sit: Minimum assistance(A for steadying due to involuntary movements) ADL Assessment: 
Feeding: Setup Oral Facial Hygiene/Grooming: Setup;Supervision(seated at bed level) Bathing: Minimum assistance(infer A for bilateral lower legs) Upper Body Dressing: Minimum assistance(for LVAD management due to cues for sequencing) Lower Body Dressing: Moderate assistance(A for bilateral socks however reports using sockaid PTA) Toileting: Minimum assistance; Other (comment)(using bed pan due to inability to get to commode) ADL Intervention and task modifications: 
  
Patient received supine in bed agreeable to participate in OT intervention. Patient required contact guard assist for supine > sit transfer EOB. Patient completed LVAD switchover x MIN A however with maximal verbal cues for sequencing through task and assistance for picking batteries up when patient dropped due to involuntary movements. Patient attempted sit <> stand transfer x MIN A with voluntary jerky movements of BLE noted.  OT with safety concerns and patient verbalizing need to have BM. OT assisted patient with sit > supine transfer x CGA and patient required MIN A for rolling to left side for bed pan positioning. Patient left supine on bed pan with call bell in reach. Upper Body Dressing Assistance Dressing Assistance: Minimum assistance(for LVAD management) Cognitive Retraining Safety/Judgement: Decreased insight into deficits; Decreased awareness of need for safety;Decreased awareness of need for assistance Increase activity tolerance for home, work, and sexual intercourse by pacing self with increasing the arm exercises, sitting duration, frequency OOB, walking, standing, and ADLs. Instructed and indicated understanding of s/s of too much activity, how to respond to s/s safely. Patient demonstrated switchover from power module to battery in prep for ADL routine with MIN A. Demonstrated the following tasks:  
 Independent Verbal cues Physical assistance Comments Check PM & SC  x Ensure  clipped onto stabilization belt   x Remove front (green lighted) batteries from , place back batteries into front slots   x Check batteries  x Position batteries into battery clips x Don holster vest   x Disconnect power leads  x Insert power lead into battery clip x [place] batteries in holster x Secure batteries with Velcro and clips x Functional Measure: 
Barthel Index: 
 
Bathin Bladder: 0(using Purewick 2* safety concerns) Bowels: 5(smear on bed pad and requiring bed pan) Groomin Dressin Feedin Mobility: 0 Stairs: 0 Toilet Use: 5 Transfer (Bed to Chair and Back): 5 Total: 30/100 The Barthel ADL Index: Guidelines 1. The index should be used as a record of what a patient does, not as a record of what a patient could do. 2. The main aim is to establish degree of independence from any help, physical or verbal, however minor and for whatever reason. 3. The need for supervision renders the patient not independent. 4. A patient's performance should be established using the best available evidence. Asking the patient, friends/relatives and nurses are the usual sources, but direct observation and common sense are also important. However direct testing is not needed. 5. Usually the patient's performance over the preceding 24-48 hours is important, but occasionally longer periods will be relevant. 6. Middle categories imply that the patient supplies over 50 per cent of the effort. 7. Use of aids to be independent is allowed. Chris Mackenzie, Barthel, DLacieW. (2170). Functional evaluation: the Barthel Index. [address] [facility] (14)2. Adrian Thompson isaac HILDA EagleF, Dara Celestin., Mag Gan., Som Mensah, [address] [facility] (1999). Measuring the change indisability after inpatient rehabilitation; comparison of the responsiveness of the Barthel Index and Functional [place] Measure. Journal of Neurology, Neurosurgery, and Psychiatry, 66(4), 020-549. WILDA Estrella, ROSLYN Still, & Trena Ornelas MKERWIN. (2004.) Assessment of post-stroke quality of life in cost-effectiveness studies: The usefulness of the Barthel Index and the EuroQoL-5D. [facility], 22, [phone] Occupational Therapy Evaluation Charge Determination History Examination Decision-Making LOW Complexity : Brief history review  MEDIUM Complexity : 3-5 performance deficits relating to physical, cognitive , or psychosocial skils that result in activity limitations and / or participation restrictions MEDIUM Complexity : Patient may present with comorbidities that affect occupational performnce. Miniml to moderate modification of tasks or assistance (eg, physical or verbal ) with assesment(s) is necessary to enable patient to complete evaluation Based on the above components, the patient evaluation is determined to be of the following complexity level: LOW Activity Tolerance: Poor, requires rest breaks, and elevated HR with episodes of VTAC in session After treatment patient left in no apparent distress:   
Supine in bed and Call bell within reach COMMUNICATION/EDUCATION:  
The patients plan of care was discussed with: Registered nurse and Case management. Home safety education was provided and the patient/caregiver indicated understanding., Patient/family have participated as able in goal setting and plan of care. , and Patient/family agree to work toward stated goals and plan of care. This patients plan of care is appropriate for delegation to [facility] [place]. Thank you for this referral. 
Liya Khan Time Calculation: 38 mins

## 2020-11-13 NOTE — PROGRESS NOTES
[address] [facility] [place] 904 [facility] [place] in [address],  E [facility] Heart Failure Inpatient Progress Note Patient name: Deanna Fontenot Patient : 1950 Patient MRN: [id number] Date of service: 20 CHIEF COMPLAINT: 
Cellulitis 
  
PLAN: 
Admitted for suspected worsening of pump infection, ROCIO on CKD, and acute blood loss anemia due to traumatic leg injury s/p fall Continue current device speed increased to 9400 with low speed limit of 9000; intolerant to higher speed due to VT 
PYP strongly suggestive of aTTR amyloidosis; will consider addition of tafamidis as OP (will require patient assistance) Intolerant of Coreg due to hypotension Intolerant of ACEi/ARB/ARNI due to aTTR Intolerant of spironolactone due to hyperkalemia Intolerant of mexiletine; continue Ranexa (1,000 mg PO BID) per EP; will need to monitor QTc Continue to hold diuretics Renal consult appreciated; IVF D/C'd  
Continue warfarin; 2 mg this AM and 2 mg tonight Goal INR 2-2.5 (2.0 today) Note General Surgery recs to hold [facility] to allow for wound healing - can not d/c all together but can lower goal to 2-2.5) Venofer x 2 Transfuse to maintain Hgb > 7; management per Hospitalist  
Antibiotics per ID - consulted; trend LA and PCT, blood cultures Avoid benzos in setting of AMS Worsening respiratory acidosis following admin of O2 in setting of COPD; recommend avoiding supplemental O2 and using Trilogy for respiratory support PRN 
DTC consult recs appreciated, HgbA1C -8.4% NPH and SSI per DTC recs PT/OT; note OT recs for rehab - CM following Nutrition consult ordered GI, urogynecology, and pulmonary consults as OP  
 
RECENT EVENTS: 
Cr improving Mentation improved VAD flows stable Hgb stable INR 2.0 IMPRESSION: 
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 Sanna Samano at [facility]) · C/b delayed extubation due to severe COPD 
· C/b critical illness polyneuropathy · C/b prolonged hospitalization post-LVAD, 55 days · C/b sternal wound infection s/p debridement (by Dr. Sanna Samano) s/p wound vac · C/b sacral ulcer · Would culture positive for Staph aureus, not MRSA · C/b LVAD site drainage, improved S/p CRT-ICD · ICD fired due to electrolyte imbalance (2011) H/o breast cancer (1992) · s/p bilateral mastectomy/chemo and endometrial cancer s/p hysterectomy · Lymphedema of LLE due to cancer treatment Severe COPD with FEV1 50% Depression Atrial fibrillation H/o \"two mini strokes\" S/p fall with hip facture · Right hip hemmiarthroplasty (5/23/18) by Dr. Aarti Horton) · S/p removed hip hardwarare due to pain (4/15/19) COPD severe CKD, stage 3 Hyperkalemia Pulmonary hypertension Cardiac risk factors: · Morbid obesity, Body mass index is 52.55 kg/m². · DM2 insulin dependent · JED on CPAP 
· HL Urinary incontinence, severe · no procedures due to anticoagulation · conservative management with Detrol 4 pills bid Endometrial cancer (2002) HTN 
HL 
aTTR amyloidosis Left leg wound following fall Encephalopathy Renal failure  
  
CARDIAC IMAGING: 
Echo (9/24/19) LVEF 20-25%, AV opens 1:1, no AR Echo (5/29/18) LVEF 10-%, ramps study done, report in epic Echo (1/9/18) ramp study done, LVEDD 7.1cm LHC (11/9/18) 2 vessel disease with 90% OM, 80% PDA, DSA to PDA branch 
TTE (10/27/20) LVEF 15-20% LVIDd 7.26cm RVIDd 2.82cm Tapse 1.14cm 
  
HEMODYNAMICS: 
RHC not done CPEST not done 6MW not done 
  
OTHER IMAGING: 
 EGD/C-scope (19) no active bleeding and polyp removed. 
  
FAMILY HISTORY: 
Mother  76 years, diagnosed with DM, HTN, CAD Father  [de-identified]years old, HTN, CAD, MI 
Positive for DM and heart disease in the family, brother with valve replacement 
  
SOCIAL HISTORY: 
Never alcohol, never smoked, , lives alone, no illicit drug use, lives in house, ambulatory status indpendedn, children: daughter, occupation retired Lives alone. 
  
ALLERGIES: benzodiazepines and quetiapine fumerate (lethargy, resp failure to both) 
  
HISTORY OF PRESENT ILLNESS: 
Conrado Taylor a 78 y. o. female with multiple comorbidities who was transferred from [facility] LVAD [facility]  She recently had a prolonged hospitalization at [facility] from 10/27/20-20 for treatment of volume overload, LVAD pump infection, VT, and debility. She was discharged home on  and shortly thereafter became dizzy and fell, severely injuring her left shin. She was seen in the ER for wound care and discharged home. She continued to complain of weakness, dizziness, fatigue, and altered balance. She was brought via personal vehicle to [facility] ED where she was noted to have significant ROCIO on CKD, acute blood loss anemia, and suspected worsening pump infection. [facility] has been consulted for assistance with the management of her LVAD.  
  
REVIEW OF SYSTEMS: 
General: Denies fever, fatigue Ear, nose and throat: Denies difficulty hearing, sinus problems, runny nose, post-nasal drip, ringing in ears, mouth sores, loose teeth, ear pain, nosebleeds, sore throate, facial pain or numbess Cardiovascular: see above in the interval history Respiratory: denies cough, wheezing, sputum production, or hemoptysis. Gastrointestinal: Denies heartburn, constipation, intolerance to certain foods, diarrhea, abdominal pain, nausea, vomiting, difficulty swallowing, blood in stool Kidney and bladder: Denies painful urination, frequent urination, or urgency Musculoskeletal: Denies joint pain, muscle weakness, +hand tremors Skin and hair: Denies change in hair loss or increase, breast changes Redness and edema, LLE wound s/p fall, skin breakdown RLE. PHYSICAL EXAM: 
Visit Vitals Pulse 72 Temp 97.7 °F (36.5 °C) Resp 18 Wt 287 lb 4.8 oz (130.3 kg) SpO2 94% BMI 52.55 kg/m² LVAD Pump Speed (RPM): 9400 Pump Flow (LPM): 4.6 MAP: 80 
PI (Pulsitility Index): 7 Power: 5.5  Test: No 
Back Up  at Bedside & Labeled: Yes Power Module Test: No 
Driveline Site Care: Yes Driveline Dressing: Changed per order Outpatient: No 
MAP in Therapeutic Range (Outpatient): Yes General: Patient is morbidly obese, lethargic, confused, +myoclonal jerking HEENT: Normocephalic and atraumatic. No scleral icterus. Pupils are equal, round and reactive to light and accomodation. No conjunctival injection. Oropharynx is clear. Neck: Supple. No evidence of thyroid enlargements or lymphadenopathy. JVD: Cannot be appreciated Lungs: Breath sounds are equal and clear bilaterally. No wheezes, rhonchi, or rales. Heart: VAD hum Abdomen: Soft, obese, no mass or tenderness. No organomegaly or hernia. Bowel sounds present. Genitourinary and rectal: deferred Extremities: No cyanosis, clubbing, Redness and edema, skin breakdown RLE; BLE edema 2+, venous stasis, +LLE wound Neurologic: No focal sensory or motor deficits are noted. Grossly intact. Psychiatric: Awake, alert and oriented x 3. Appropriate mood and affect. Skin: Warm, dry, no nodules + petechiae on extremities PAST MEDICAL HISTORY: 
Past Medical History:  
Diagnosis Date  Asthma  Cancer ([facility] [address] breast  
 Cancer ([facility] [address]   
 endometrial  
 Congestive heart failure, unspecified  CRI (chronic renal insufficiency)  Depression  Diabetes ([facility] [address]  Hypertension PAST SURGICAL HISTORY: 
Past Surgical History:  
Procedure Laterality Date  HX HERNIA REPAIR    
 HX HYSTERECTOMY  HX MASTECTOMY FAMILY HISTORY: 
History reviewed. No pertinent family history. SOCIAL HISTORY: 
Social History Socioeconomic History  Marital status:  Spouse name: Not on file  Number of children: Not on file  Years of education: Not on file  Highest education level: Not on file Tobacco Use  Smoking status: Never Smoker  Smokeless tobacco: Never Used Substance and Sexual Activity  Alcohol use: Not Currently LABORATORY RESULTS: 
Labs Latest Ref Rng & Units 11/13/2020 11/12/2020 11/11/2020 11/11/2020 11/11/2020 11/10/2020 11/10/2020 WBC 3.6 - 11.0 K/uL 6.7 6.5 - 9.6 - 10.1 11. 4(H)  
RBC 3.80 - 5.20 M/uL 2.80(L) 2.68(L) - 2.68(L) - 2.64(L) 2.44(L) Hemoglobin 11.5 - 16.0 g/dL 7. 8(L) 7. 4(L) 7. 0(L) 7. 4(L) - 7. 0(L) 6. 3(L) Hematocrit 35.0 - 47.0 % 25. 1(L) 24. 3(L) 22. 6(L) 23. 9(L) - 22. 7(L) 21. 0(L) MCV 80.0 - 99.0 FL 89.6 90.7 - 89.2 - 86.0 86.1 Platelets 537 - 291 K/uL 136(L) 123(L) - 140(L) - 144(L) 157 Lymphocytes 12 - 49 % - - - - - 11(L) 11(L) Monocytes 5 - 13 % - - - - - 7 8 Eosinophils 0 - 7 % - - - - - 2 2 Basophils 0 - 1 % - - - - - 0 0 Albumin 3.5 - 5.0 g/dL 2. 8(L) 3.0(L) - - 2. 8(L) - 2. 9(L) Calcium 8.5 - 10.1 MG/DL 8.4(L) 8.7 - - 8. 4(L) 8.6 9.0 Glucose 65 - 100 mg/dL 213(H) 113(H) - - 171(H) 174(H) 170(H) BUN 6 - 20 MG/DL 75(H) 96(H) - - 98(H) 94(H) 92(H) Creatinine 0.55 - 1.02 MG/DL 3.08(H) 3.88(H) - - 4.68(H) 4.89(H) 4.96(H) Sodium 136 - 145 mmol/L 139 140 - - 138 137 138 Potassium 3.5 - 5.1 mmol/L 4.6 4.6 - - 4.6 5.0 5.2(H) TSH 0.450 - 4.500 uIU/mL - - - - - - -  
LDH 81 - 246 U/L 219 266(H) - - - - - Some recent data might be hidden ALLERGY: 
No Known Allergies CURRENT MEDICATIONS: 
 
Current Facility-Administered Medications:  
  ranolazine ER (RANEXA) tablet 1,000 mg, 1,000 mg, Oral, BID, Meme Easton MD, 1,000 mg at 11/13/20 5199   cefepime (MAXIPIME) 1 g in 0.9% sodium chloride (MBP/ADV) 50 mL MBP, 1 g, IntraVENous, Q24H, Krissy Thompson MD, Last Rate: 100 mL/hr at 11/13/20 1018, 1 g at 11/13/20 1018   Warfarin- MD/NP to Dose , , Other, Rx Dosing/Monitoring, Sophia Walter, TEA 
  ferrous sulfate tablet 325 mg, 325 mg, Oral, DAILY WITH BREAKFAST, Mau KATE, DO, 325 mg at 11/13/20 1017 
  docusate sodium (COLACE) capsule 100 mg, 100 mg, Oral, BID, Meche Ventura, DO, Stopped at 11/13/20 1017 
  gabapentin (NEURONTIN) capsule 300 mg, 300 mg, Oral, DAILY, Meche Ventura, DO, 300 mg at 11/13/20 1018   hydrOXYzine HCL (ATARAX) tablet 10 mg, 10 mg, Oral, TID PRN, CHARLENE Frausto M, DO 
  PARoxetine (PAXIL) tablet 30 mg, 30 mg, Oral, DAILY, CHARLENE Ventura, DO, 30 mg at 11/13/20 1018 
  glucose chewable tablet 16 g, 4 Tab, Oral, PRN, CHARLENE Frausto M, DO 
  glucagon (GLUCAGEN) injection 1 mg, 1 mg, IntraMUSCular, PRN, Mau KATE DO 
  dextrose 10% infusion 0-250 mL, 0-250 mL, IntraVENous, PRN, CHARLENE Frausto, DO 
  insulin lispro (HUMALOG) injection, , SubCUTAneous, AC&HS, Mau KATE DO, 2 Units at 11/13/20 1254   sodium chloride (NS) flush 5-40 mL, 5-40 mL, IntraVENous, Q8H, Fortino Chinchilla MD, 10 mL at 11/13/20 0640 
  sodium chloride (NS) flush 5-40 mL, 5-40 mL, IntraVENous, PRN, Fortino Chinchilla MD 
  acetaminophen (TYLENOL) tablet 650 mg, 650 mg, Oral, Q6H PRN, 650 mg at 11/13/20 0650 **OR** acetaminophen (TYLENOL) suppository 650 mg, 650 mg, Rectal, Q6H PRN, Fortino Chinchilla MD 
  polyethylene glycol (MIRALAX) packet 17 g, 17 g, Oral, DAILY PRN, Fortino Chinchilla MD 
  promethazine (PHENERGAN) tablet 12.5 mg, 12.5 mg, Oral, Q6H PRN **OR** ondansetron (ZOFRAN) injection 4 mg, 4 mg, IntraVENous, Q6H PRN, Fortino Chinchilla MD 
 
Thank you for your referral and allowing me to participate in this patient's care. Tree Loza NP [facility] [address] [address] [facility] 
 [address], [address] Phone: [phone] Fax: [phone] PATIENT CARE TEAM: 
Patient Care Team: 
Amber Leija NP as PCP - General (Nurse Practitioner) AHF ATTENDING ADDENDUM Patient was seen and examined in person. Data and notes were reviewed. I have discussed and agree with the plan as noted in the NP note above without further additions. Jagdish Patel MD PhD 
Sameer Griffin 9 [facility]

## 2020-11-13 NOTE — PROGRESS NOTES
0730: Bedside shift change report given to [address] (oncoming nurse) by Radha Valle RN (offgoing nurse). Report included the following information SBAR, Kardex, Intake/Output, MAR, Accordion, Recent Results, and Cardiac Rhythm paced . 1200: LVAD dressing changed with sterile technique. Scant draining around driveline. 1930: Bedside shift change report given to [id number] [facility] (oncoming nurse) by Estee Palacios RN (offgoing nurse). Report included the following information SBAR, Kardex, Intake/Output, MAR, Accordion, Recent Results and Cardiac Rhythm paced. Problem: Falls - Risk of 
Goal: *Absence of Falls Description: Document [place] Fall Risk and appropriate interventions in the flowsheet. Outcome: Progressing Towards Goal 
Note: Fall Risk Interventions: 
Mobility Interventions: Communicate number of staff needed for ambulation/transfer, Patient to call before getting OOB, Utilize walker, cane, or other assistive device, Utilize gait belt for transfers/ambulation Medication Interventions: Patient to call before getting OOB, Teach patient to arise slowly Elimination Interventions: Call light in reach, Patient to call for help with toileting needs, Urinal in reach, Stay With Me (per policy) History of Falls Interventions: Room close to nurse's station, Utilize gait belt for transfer/ambulation, Door open when patient unattended Problem: Patient Education: Go to Patient Education Activity Goal: Patient/Family Education Outcome: Progressing Towards Goal 
  
Problem: Pressure Injury - Risk of 
Goal: *Prevention of pressure injury Description: Document Jaime Scale and appropriate interventions in the flowsheet.  
Outcome: Progressing Towards Goal 
Note: Pressure Injury Interventions: 
Sensory Interventions: Assess changes in LOC, Assess need for specialty bed, Check visual cues for pain, Discuss PT/OT consult with provider, Keep linens dry and wrinkle-free, Maintain/enhance activity level, Pressure redistribution bed/mattress (bed type) Moisture Interventions: Check for incontinence Q2 hours and as needed, Apply protective barrier, creams and emollients, Absorbent underpads, Limit adult briefs, Maintain skin hydration (lotion/cream), Minimize layers Activity Interventions: Increase time out of bed, PT/OT evaluation Mobility Interventions: HOB 30 degrees or less, Pressure redistribution bed/mattress (bed type) Nutrition Interventions: Document food/fluid/supplement intake Friction and Shear Interventions: Lift sheet, Lift team/patient mobility team, Minimize layers Problem: Patient Education: Go to Patient Education Activity Goal: Patient/Family Education Outcome: Progressing Towards Goal 
  
Problem: Heart Failure: Day 5 Goal: Activity/Safety Outcome: Progressing Towards Goal 
Goal: Diagnostic Test/Procedures Outcome: Progressing Towards Goal 
Goal: Nutrition/Diet Outcome: Progressing Towards Goal 
Goal: Discharge Planning Outcome: Progressing Towards Goal 
Goal: Medications Outcome: Progressing Towards Goal 
Goal: Respiratory Outcome: Progressing Towards Goal 
Goal: Treatments/Interventions/Procedures Outcome: Progressing Towards Goal 
Goal: Psychosocial 
Outcome: Progressing Towards Goal

## 2020-11-13 NOTE — PROGRESS NOTES
[facility] Progress Note REFERRING PROVIDER: Dr Valerie Varghese Subjective:  
  
Rachel Ryan is a 78 y.o. patient who is seen for evaluation/management of VT & mexiletine intolerance. She was readmitted on 11/09/2020 after a fall, suffered leg wound bleeding. She had been discharged on Levaquin, still being treated for leg cellulitis (Morganella morganii), has renal failure, advanced systolic CHF, & anemia She had started mexiletine on 11/02/2020 during a recent admission for control of recurrent VT. Her daughter then reported that she was unsteady, fatigued, nauseated, & shaky with Levaquin & mexiletine. Antibiotic changed to cefepime IV. Stopped mexiletine this admission, started ranolazine 500 mg po bid on 11/11/2020. 
 
+ non sustained 8-15 beats of very wide complex VT at rate 150 bpm noted 8 20 pm on telemetry. Atrial & ventricular pacing.   
  
RV & LV failure, is s/p LVAD in 2017 at [facility].  NYHA III chronic systolic CHF. On appropriate GDMT. 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 
  
[employer] dual chamber AICD had been checked earlier this month, showed proper function. Pacing rate 70 bpm to suppress PVCs. Anticoagulated with warfarin, but recommendation of general surgery to hold to allow for leg wound healing. [facility] has lowered target INR to 2-2.5 (2.3 today). 
 
   
Previous: 
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 Echo (11/02/2020): LVEF 15-20%, LVAD in place. Mildly dilated RV with mildly reduced RVEF. Mildly dilated LA. Aortic valve sclerosis without stenosis. 
  
Treated for abscess of sternal wound since late 2019. 
  
[facility] (11/09/2018): 2 vessel disease with 90% OM, 80% PDA, DSA to PDA branch 
  
S/p PCI approx 2017. Reports history of Plavix resistance. 
  
[facility] (08/2016): High grade ramus & small PDA disease, borderline disease of LAD & takeoff of pRCA.  
  
 Previously followed by [facility], now sees Dr. Dante Parmar. 
  
Mother with CAD,  age 76. Father  age [de-identified], CAD & MI.  Brother with valve replacement. 
  
 
Patient Active Problem List  
Diagnosis Code  Obesity, morbid [facility] E66.01  
 Acute on chronic systolic CHF (congestive heart failure) ([facility]) I50.23  
 NICM (nonischemic cardiomyopathy) ([facility] I42.8  Coronary artery disease involving native coronary artery of native heart without angina pectoris I25.10  
 JED on CPAP G47.33, Z99.89  Chronic venous insufficiency I87.2  Ulcer of right foot, limited to breakdown of skin ([facility]) L97.521  
 NSVT (nonsustained ventricular tachycardia) ([facility]) I47.2  Anemia D64.9  ROCIO (acute kidney injury) ([facility] N17.9  Coagulopathy ([facility] D68.9  
 Open wound of left lower leg S81.802A Current Facility-Administered Medications Medication Dose Route Frequency Provider Last Rate Last Dose  
 0.9% sodium chloride infusion  75 mL/hr IntraVENous CONTINUOUS Jaspreet Mendoza MD 75 mL/hr at 20 1241 75 mL/hr at 20 1241  cefepime (MAXIPIME) 1 g in 0.9% sodium chloride (MBP/ADV) 50 mL MBP  1 g IntraVENous Q24H Sea Garcia MD      
 Warfarin- MD/NP to Dose    Other Rx Dosing/Monitoring Lane Walter NP      
 0.9% sodium chloride infusion 250 mL  250 mL IntraVENous PRN Nahdi KATE, DO      
 ranolazine ER (RANEXA) tablet 500 mg  500 mg Oral BID Elroy Becerra MD   500 mg at 20 1715  [Held by provider] carvediloL (COREG) tablet 12.5 mg  12.5 mg Oral BID WITH MEALS Pedrito Denise MD   12.5 mg at 11/10/20 4380  ferrous sulfate tablet 325 mg  325 mg Oral DAILY WITH Kelsy Meche Padron DO   325 mg at 20 3643  docusate sodium (COLACE) capsule 100 mg  100 mg Oral BID Nahid KATE DO   100 mg at 20 0490  gabapentin (NEURONTIN) capsule 300 mg  300 mg Oral DAILY Nahid KATE DO   300 mg at 20 9982  hydrOXYzine HCL (ATARAX) tablet 10 mg  10 mg Oral TID PRN Víctor Reynolds M, DO      
 PARoxetine (PAXIL) tablet 30 mg  30 mg Oral DAILY Víctor Reynolds M, DO   30 mg at 11/12/20 7825  glucose chewable tablet 16 g  4 Tab Oral PRN Víctor Reynolds M, DO      
 glucagon (GLUCAGEN) injection 1 mg  1 mg IntraMUSCular PRN Víctor Reynolds M, DO      
 dextrose 10% infusion 0-250 mL  0-250 mL IntraVENous PRN Arthur Thibodeaux , DO      
 insulin lispro (HUMALOG) injection   SubCUTAneous AC&HS [facility] Star[facility], DO   2 Units at 11/13/20 4673  
 0.9% sodium chloride infusion 250 mL  250 mL IntraVENous PRN Adryan Miramontes MD      
 sodium chloride (NS) flush 5-40 mL  5-40 mL IntraVENous Q8H Evan Wakefield MD   10 mL at 11/13/20 0640  
 sodium chloride (NS) flush 5-40 mL  5-40 mL IntraVENous PRN Evan Wakefield MD      
 acetaminophen (TYLENOL) tablet 650 mg  650 mg Oral Q6H PRN Evan Wakefield MD   650 mg at 11/13/20 7602 Or  acetaminophen (TYLENOL) suppository 650 mg  650 mg Rectal Q6H PRN Evan Wakefield MD      
 polyethylene glycol [facility]) packet 17 g  17 g Oral DAILY PRN Evan Wakefield MD      
 promethazine (PHENERGAN) tablet 12.5 mg  12.5 mg Oral Q6H PRN Evan Wakefield MD      
 Or  
 ondansetron [facility]) injection 4 mg  4 mg IntraVENous Q6H PRN Evan Wakefield MD      
 
No Known Allergies Past Medical History:  
Diagnosis Date  Asthma  Cancer ([facility] breast  
 Cancer ([facility]   
 endometrial  
 Congestive heart failure, unspecified  CRI (chronic renal insufficiency)  Depression  Diabetes ([facility]  Hypertension Past Surgical History:  
Procedure Laterality Date  HX HERNIA REPAIR    
 HX HYSTERECTOMY  HX MASTECTOMY See HPI for family history. Social History Tobacco Use  Smoking status: Never Smoker  Smokeless tobacco: Never Used Substance Use Topics  Alcohol use: Not Currently Review of Systems: All other review of systems otherwise negative. Constitutional: Negative for fever, chills, weight loss, + malaise/fatigue. HEENT: Negative for nosebleeds, vision changes. Respiratory: Negative for cough, hemoptysis Cardiovascular: Negative for chest pain, palpitations, orthopnea, claudication, + leg swelling, no syncope, and PND. Gastrointestinal: Negative for nausea, vomiting, diarrhea, blood in stool and melena. Genitourinary: Negative for dysuria, and hematuria. Musculoskeletal: Negative for myalgias, + arthralgia. + involuntary muscle jerking but has been less often Skin: Negative for rash. + left leg wound. Heme: + bleed or bruise easily. Neurological: Negative for speech change and + general weakness Objective: LVAD Visit Vitals Pulse 74 Temp 97.9 °F (36.6 °C) Resp 18 Wt 287 lb 4.8 oz (130.3 kg) SpO2 98% BMI 52.55 kg/m² Physical Exam:  
Constitutional: Well-developed and well-nourished. No respiratory distress. Head: Normocephalic and atraumatic. Eyes: Pupils are equal, round ENT: Hearing grossly normal 
Neck: Supple. No JVD present. Cardiovascular: LVAD sounds. Pulmonary/Chest: Effort normal and breath sounds normal. No wheezes. Abdominal: Morbidly obese Musculoskeletal: Moves extremities independently. Neurological: Alert,oriented. Skin: Skin is warm and dry. Dressings to leg wounds are clean Psychiatric: Normal mood and affect. Behavior is normal. Judgment and thought content normal.   
 
CMP:  
Lab Results Component Value Date/Time   11/13/2020 05:11 AM  
 K 4.6 11/13/2020 05:11 AM  
  11/13/2020 05:11 AM  
 CO2 25 11/13/2020 05:11 AM  
 AGAP 6 11/13/2020 05:11 AM  
  (H) 11/13/2020 05:11 AM  
 BUN 75 (H) 11/13/2020 05:11 AM  
 CREA 3.08 (H) 11/13/2020 05:11 AM  
 GFRAA 18 (L) 11/13/2020 05:11 AM  
 GFRNA 15 (L) 11/13/2020 05:11 AM  
 CA 8.4 (L) 11/13/2020 05:11 AM  
 MG 2.9 (H) 11/13/2020 05:13 AM  
 ALB 2.8 (L) 11/13/2020 05:11 AM  
 TP 6.7 11/13/2020 05:11 AM  
 GLOB 3.9 11/13/2020 05:11 AM  
 AGRAT 0.7 (L) 11/13/2020 05:11 AM  
 ALT 10 (L) 11/13/2020 05:11 AM  
 
CBC:  
Lab Results Component Value Date/Time WBC 6.7 11/13/2020 05:13 AM  
 HGB 7.8 (L) 11/13/2020 05:13 AM  
 HCT 25.1 (L) 11/13/2020 05:13 AM  
  (L) 11/13/2020 05:13 AM  
  
COAGS:  
Lab Results Component Value Date/Time PTP 20.3 (H) 11/13/2020 05:11 AM  
 INR 2.0 (H) 11/13/2020 05:11 AM  
 
 
Assessment/Plan: Ms. Astrid Baker has RV & LV failure, is s/p LVAD in 2017 at [facility].   
No ACEi/ARB due to CKD, but otherwise on appropriate GDMT. Nuclear PYP scan indicated amyloid ATTR type-await medical therapy if can get assistance with drug cost per Dr Benjamin Ku Previously noted nausea with mexiletine for recurrent VT, now with questionable neurologic side effect (involuntary muscle jerking). Mexiletine has been discontinued. Not a good candidate for amiodarone given RV failure. Ablation is not possible given ongoing infection, LVAD, morbid obesity, severe anemia, & JED. Non sustained and asymptomatic recurrent VT noted day 1 after switching mexiletine to ranolazine 500 mg po bid (off label use). will increase to 1000 mg po bid but given her chronic renal failure and creatinine 3, 12 lead ECG will be ordered today for QT measurement She had ECG 11/9 with AV pacing and QT interval 420 ms at 70 bpm pacing If QTc is longer than 550 ms with ventricular pacing, ranexa dose has to be reduced or even stopped Coreg has been stopped due to low pressure and RV failure NSVT rate was slow 150 bpm and QRS complexes are wide indicating epicardial type, potassium was normal 
She has LVAD so epicardial ablation will not be feasible even if she is stable enough for the procedure Right Atrial & right ventricular pacing, she has [employer] dual chamber ICD; recent check showed proper function. 2 years of battery left. Upgrading to [facility] ICD would not be likely helping her as she has LVAD in place and amyloid cardiomyopathy Thank you for involving me in this patient's care and please call with further concerns or questions. Payton Ibarra M.D. Electrophysiology/Cardiology [facility] and [facility] [place] [address] [facility] [address]                               
[phone]

## 2020-11-13 NOTE — WOUND CARE
WOCN Note: Follow-up visit for leg wounds. Chart shows: 
Admitted for leg avulsion post fall with some superficial debridement done by Dr. Maico Lennon History of LVAD, DM  
  
Assessment:  
Appropriately conversational; tolerates wound care well. Needs assists in repositioning fully and turned onto left side to remove bedpan. Bed: total care with foam mattress, alarm pad, & EHOB/waffle cushion 
  
Bilateral heel and sacral skin intact and without erythema. Heels offloaded with pillows. No edema or erythema to lower legs and feet.  hemosiderin staining to bilateral lower legs.  
  
1. POA, left lower anterior leg = 7.5 x 6 x 0.7 cm  100% moist red newly traumatized tissue; non-granular. Small amount of bleeding with clotting. Immediate periwound skin with darkening that may be lost tissue but will wait for it to declare itself. Irregular/tattered margins. Rest of periwound with no redness or calor, no edema. Cleaned with saline, applied collagen with silver, covered with Mepitel One and dry bandage. ACE wrap from toes to knee. 
  
2. POA, right lower anterior leg from previous edema per daughter's report = 1 x 0.6 x 0.2 cm  No exudate and base is moist red. Periwound with no redness. Margins are flat and wound appears ready to resurface with some light pink epithelialization at margin. Foam dressing applied.  
  
POA, bilateral buttocks with soft bulging purple tissue that fully blanches - no change. 3. Right = 8 x 6 x 0 cm 4. Left = 5 x 3 x 0 cm Some dry desquamation over this area. Foam dressing applied to protect. 
  
5. POA, right lower labia majora also has purple bulging tissue that is pliable and not taut, fully blanches - seen by Dr. Sarah Suarez today.  
  
Recommendations: LLL: clean with saline, apply Collagen dressing (left in room), secure collagen with Mepitel One and then cover with dry gauze. Change every 3 days. RLL: clean with saline, apply petrolatum and foam dressing. Change every 3 days. Buttocks: protect with foam dressing and change as needed with soiling Turn/reposition approximately every 2 hours and offload heels with pillows at all times in bed. EHOB cushion placed over bed alarm pad. 
  
Transition of Care: Plan to follow as needed while admitted to hospital.  
 
HEMA ReedN, RN, [facility] Certified Wound, Ostomy, Continence Nurse 
office [phone] 
pager [phone] or call  to page

## 2020-11-13 NOTE — PROGRESS NOTES
[facility] 
 [address] [facility], [address] [facility] Phone: [phone]  
  
Nephrology Progress Note 
Ezequiel Amin     1950     [id number] Date of Admission : 11/9/2020 11/13/20 CC: Follow up for ARF Assessment and Plan ROCIO on CKD 
- Possible ATN from IV volume depletion, blood loss. - No obstruction on imaging, urine eos neg 
- UOP and Cr improving  
- can d/c IVF and monitor 
- no urgent need for RRT at this time 
  
CKD stage III. -Baseline creatinine prior to last admission was 1.3 mg/dL 
-Creatinine close to 2.0-2.1 when she was euvolemic. 
-Etiology of CKD is likely diabetes hypertension and cardiomyopathy. However she does have a positive PYP testing which raises the possibility of amyloidosis. 
  
Chronic systolic CHF, stage C NYHA class IV 
-Combined ischemic and nonischemic cardiomyopathy 
-S/p HM 2 LVAD implant 4/15/2017 by Dr. Kadie Peter at [facility] 
- hx of recurrent VT : off mexiletine 
-Recent acute on chronic RV failure. -Recent sternal wound infection with staph aureus and Morganella. Has LVAD site drainage. 
  
Left Calf laceration Blood loss anemia - per primary team/ surgery Severe COPD. Pulmonary hypertension. Chronic A. fib. History of endometrial answer ATTR amyloidosis Interval History: 
Seen and examined. Awake, feeling better. Cr down to 3, UOP 1 L in the past 24 hrs. No reported cp, sob, n/v/d. Review of Systems: A comprehensive review of systems was negative except for that written in the HPI. Current Medications:  
Current Facility-Administered Medications Medication Dose Route Frequency  ranolazine ER (RANEXA) tablet 1,000 mg  1,000 mg Oral BID  cefepime (MAXIPIME) 1 g in 0.9% sodium chloride (MBP/ADV) 50 mL MBP  1 g IntraVENous Q24H  Warfarin- MD/NP to Dose    Other Rx Dosing/Monitoring  0.9% sodium chloride infusion 250 mL  250 mL IntraVENous PRN  
  [Held by provider] carvediloL (COREG) tablet 12.5 mg  12.5 mg Oral BID WITH MEALS  ferrous sulfate tablet 325 mg  325 mg Oral DAILY WITH BREAKFAST  docusate sodium (COLACE) capsule 100 mg  100 mg Oral BID  
 gabapentin (NEURONTIN) capsule 300 mg  300 mg Oral DAILY  hydrOXYzine HCL (ATARAX) tablet 10 mg  10 mg Oral TID PRN  
 PARoxetine (PAXIL) tablet 30 mg  30 mg Oral DAILY  glucose chewable tablet 16 g  4 Tab Oral PRN  
 glucagon (GLUCAGEN) injection 1 mg  1 mg IntraMUSCular PRN  
 dextrose 10% infusion 0-250 mL  0-250 mL IntraVENous PRN  
 insulin lispro (HUMALOG) injection   SubCUTAneous AC&HS  
 0.9% sodium chloride infusion 250 mL  250 mL IntraVENous PRN  
 sodium chloride (NS) flush 5-40 mL  5-40 mL IntraVENous Q8H  
 sodium chloride (NS) flush 5-40 mL  5-40 mL IntraVENous PRN  
 acetaminophen (TYLENOL) tablet 650 mg  650 mg Oral Q6H PRN Or  
 acetaminophen (TYLENOL) suppository 650 mg  650 mg Rectal Q6H PRN  polyethylene glycol (MIRALAX) packet 17 g  17 g Oral DAILY PRN  promethazine (PHENERGAN) tablet 12.5 mg  12.5 mg Oral Q6H PRN Or  
 ondansetron (ZOFRAN) injection 4 mg  4 mg IntraVENous Q6H PRN No Known Allergies Objective: 
Vitals:   
Vitals:  
 11/12/20 2319 11/13/20 7489 11/13/20 5336 11/13/20 9452 Pulse: 74 74  74 Resp: 20 18  18 Temp: 98.5 °F (36.9 °C) 98.6 °F (37 °C)  97.9 °F (36.6 °C) SpO2: 96% 97%  98% Weight:   130.3 kg (287 lb 4.8 oz) Intake and Output: 
No intake/output data recorded. 11/11 1901 - 11/13 0700 In: 2574 [P.O.:1140; I.V.:1250] Out: 1150 [KYARN:0874] Physical Examination: 
General: Morbidly obese, in NAD HEENT: PERRL, EOMI Neck: Supple,no mass palpable Lungs : CTA 
CVS: RRR, VAD sounds Abdomen: Soft, NT, BS +, obese Extremities: trace b/l LE edema Skin: No rash or lesions. MS: No joint swelling, erythema, warmth Neurologic: awake and alert [x]    High complexity decision making was performed [x]    Patient is at high-risk of decompensation with multiple organ involvement Lab Data Personally Reviewed: I have reviewed all the pertinent labs, microbiology data and radiology studies during assessment. Recent Labs 11/13/20 
1872 11/13/20 
3853 11/12/20 
0544 11/12/20 
0520 11/11/20 
0320 NA  --  139  --  140 138 K  --  4.6  --  4.6 4.6 CL  --  108  --  108 103 CO2  --  25  --  26 27 GLU  --  213*  --  113* 171* BUN  --  75*  --  96* 98* CREA  --  3.08*  --  3.88* 4.68* CA  --  8.4*  --  8.7 8.4* MG 2.9*  --   --  3.3*  --   
PHOS  --   --   --   --  5.2* ALB  --  2.8*  --  3.0* 2.8* ALT  --  10*  --  10* 11* INR  --  2.0* 2.3*  --  2.3* Recent Labs 11/13/20 
3719 11/12/20 
0544 11/11/20 
1216 11/11/20 
0550 WBC 6.7 6.5  --  9.6 HGB 7.8* 7.4* 7.0* 7.4* HCT 25.1* 24.3* 22.6* 23.9*  
* 123*  --  140* No results found for: SDES Lab Results Component Value Date/Time Culture result: NO GROWTH 3 DAYS 11/10/2020 06:42 PM  
 Culture result: No Growth (<1000 cfu/mL) 11/09/2020 12:05 AM  
 Culture result: SCANT Morganella morganii ssp morganii (A) 10/30/2020 11:30 AM  
 Culture result: SCANT Morganella morganii ssp morganii (A) 10/30/2020 11:30 AM  
 Culture result: RARE DIPHTHEROIDS (A) 10/30/2020 11:30 AM  
 
Recent Results (from the past 24 hour(s)) GLUCOSE, POC Collection Time: 11/12/20 11:55 AM  
Result Value Ref Range Glucose (POC) 185 (H) 65 - 100 mg/dL Performed by Ramya JOHNSON   
IRON PROFILE Collection Time: 11/12/20  2:46 PM  
Result Value Ref Range Iron 25 (L) 35 - 150 ug/dL TIBC 197 (L) 250 - 450 ug/dL Iron % saturation 13 (L) 20 - 50 % NT-PRO BNP Collection Time: 11/12/20  2:46 PM  
Result Value Ref Range NT pro-BNP 3,794 (H) <125 PG/ML  
PROCALCITONIN Collection Time: 11/12/20  2:46 PM  
Result Value Ref Range Procalcitonin 0.42 ng/mL SAMPLES BEING HELD  Collection Time: 11/12/20  2:46 PM  
 Result Value Ref Range SAMPLES BEING HELD 1LAV,1BLU   
 COMMENT Add-on orders for these samples will be processed based on acceptable specimen integrity and analyte stability, which may vary by analyte. LACTIC ACID Collection Time: 11/12/20  2:48 PM  
Result Value Ref Range Lactic acid 0.3 (L) 0.4 - 2.0 MMOL/L  
EOSINOPHILS, URINE Collection Time: 11/12/20  3:03 PM  
Result Value Ref Range Eosinophils,urine Negative CHLORIDE, URINE RANDOM Collection Time: 11/12/20  3:03 PM  
Result Value Ref Range Chloride,urine random <10 MMOL/L  
SODIUM, UR, RANDOM Collection Time: 11/12/20  3:03 PM  
Result Value Ref Range Sodium,urine random 32 MMOL/L  
CREATININE, UR, RANDOM Collection Time: 11/12/20  3:03 PM  
Result Value Ref Range Creatinine, urine 124.00 mg/dL UREA NITROGEN, UR, RANDOM Collection Time: 11/12/20  3:03 PM  
Result Value Ref Range Urea Nitrogen,urine random 737 MG/DL  
URIC ACID, UR, RANDOM Collection Time: 11/12/20  3:03 PM  
Result Value Ref Range Uric Acid,urine random 48.5 MG/DL  
GLUCOSE, POC Collection Time: 11/12/20  5:13 PM  
Result Value Ref Range Glucose (POC) 130 (H) 65 - 100 mg/dL Performed by Evita Santoyo GLUCOSE, POC Collection Time: 11/12/20  9:58 PM  
Result Value Ref Range Glucose (POC) 178 (H) 65 - 100 mg/dL Performed by Shital Nose METABOLIC PANEL, COMPREHENSIVE Collection Time: 11/13/20  5:11 AM  
Result Value Ref Range Sodium 139 136 - 145 mmol/L Potassium 4.6 3.5 - 5.1 mmol/L Chloride 108 97 - 108 mmol/L  
 CO2 25 21 - 32 mmol/L Anion gap 6 5 - 15 mmol/L Glucose 213 (H) 65 - 100 mg/dL BUN 75 (H) 6 - 20 MG/DL Creatinine 3.08 (H) 0.55 - 1.02 MG/DL  
 BUN/Creatinine ratio 24 (H) 12 - 20 GFR est AA 18 (L) >60 ml/min/1.73m2 GFR est non-AA 15 (L) >60 ml/min/1.73m2 Calcium 8.4 (L) 8.5 - 10.1 MG/DL  Bilirubin, total 0.4 0.2 - 1.0 MG/DL  
 ALT (SGPT) 10 (L) 12 - 78 U/L  
 AST (SGOT) 33 15 - 37 U/L Alk. phosphatase 74 45 - 117 U/L Protein, total 6.7 6.4 - 8.2 g/dL Albumin 2.8 (L) 3.5 - 5.0 g/dL Globulin 3.9 2.0 - 4.0 g/dL A-G Ratio 0.7 (L) 1.1 - 2.2 LACTIC ACID Collection Time: 11/13/20  5:11 AM  
Result Value Ref Range Lactic acid 0.7 0.4 - 2.0 MMOL/L  
LD Collection Time: 11/13/20  5:11 AM  
Result Value Ref Range  81 - 246 U/L  
PROTHROMBIN TIME + INR Collection Time: 11/13/20  5:11 AM  
Result Value Ref Range INR 2.0 (H) 0.9 - 1.1 Prothrombin time 20.3 (H) 9.0 - 11.1 sec NT-PRO BNP Collection Time: 11/13/20  5:11 AM  
Result Value Ref Range NT pro-BNP 6,530 (H) <125 PG/ML  
PROCALCITONIN Collection Time: 11/13/20  5:11 AM  
Result Value Ref Range Procalcitonin 0.27 ng/mL CBC W/O DIFF Collection Time: 11/13/20  5:13 AM  
Result Value Ref Range WBC 6.7 3.6 - 11.0 K/uL  
 RBC 2.80 (L) 3.80 - 5.20 M/uL HGB 7.8 (L) 11.5 - 16.0 g/dL HCT 25.1 (L) 35.0 - 47.0 % MCV 89.6 80.0 - 99.0 FL  
 MCH 27.9 26.0 - 34.0 PG  
 MCHC 31.1 30.0 - 36.5 g/dL  
 RDW 17.2 (H) 11.5 - 14.5 % PLATELET 515 (L) 715 - 400 K/uL MPV 10.4 8.9 - 12.9 FL  
 NRBC 0.0 0  WBC ABSOLUTE NRBC 0.00 0.00 - 0.01 K/uL MAGNESIUM Collection Time: 11/13/20  5:13 AM  
Result Value Ref Range Magnesium 2.9 (H) 1.6 - 2.4 mg/dL GLUCOSE, POC Collection Time: 11/13/20  6:39 AM  
Result Value Ref Range Glucose (POC) 196 (H) 65 - 100 mg/dL Performed by [facility] POC EG7 Collection Time: 11/13/20  8:36 AM  
Result Value Ref Range Calcium, ionized (POC) 1.19 1.12 - 1.32 mmol/L  
 pH (POC) 7.38 7.35 - 7.45    
 pCO2 (POC) 48.7 (H) 35.0 - 45.0 MMHG  
 pO2 (POC) 79 (L) 80 - 100 MMHG  
 HCO3 (POC) 28.6 (H) 22 - 26 MMOL/L Base excess (POC) 3 mmol/L  
 sO2 (POC) 95 92 - 97 % Site RIGHT RADIAL Device: ROOM AIR Allens test (POC) YES  Specimen type (POC) ARTERIAL    
 Total resp. rate 20 Total time spent with patient:  xxx   min. Care Plan discussed with: 
Patient Family RN Consulting Physician [id number] [facility],      
 
I have reviewed the flowsheets. Chart and Pertinent Notes have been reviewed. No change in PMH ,family and social history from Consult note.  
 
 
Yoon Bloom MD

## 2020-11-13 NOTE — PROGRESS NOTES
[address] [facility] Adult  Hospitalist Group Hospitalist Progress Note Brayden Osullivan MD 
Answering service: [phone] OR [phone] from in house phone Date of Service:  2020 NAME:  Carly Herndon :  1950 MRN:  [id number] Admission Summary:  
78 y.o. female with past medical history significant for chronic systolic heart failure, ischemic cardiomyopathy with LVAD in place, hypertension, diabetes mellitus type 2 presented emergency room with increased fatigue and fall. Patient was recently admitted to the hospital and discharged on 2020 for similar hospital after being treated for cellulitis of the right lower extremity. Daughter states that since he was discharged patient has been more fatigue and having involuntary movement of her body. During that hospital stay she was started on 2 new medications including Levaquin and mexiletine. Daughter said that yesterday she fell at home injuring her left leg. She has an open wound and to the emergency room. She states that she had wound packed and sent home. Today daughter noticed that the wound started bleeding significantly for which she came to the emergency room. In the emergency room her work-up showed a hemoglobin of 6.9 down from8. As well she was found to have with worsening renal function. Admission was requested for ROCIO and anemia. In the ed wound was significantly bleeding. ED physician stopped bleeding with quick clot and its currently wrapped.  
  
 
Interval history / Subjective:  
Patient seen and examined  She has . some abnormal UE and LE movements intermittently Assessment & Plan:  
 
New right leg wound, s/p fall Acute on chronic anemia: Likely secondary to bleed from leg wound -s/p 3 unit pRBCs 
-no other source of bleeding identified  
-s/p general surgery consult with bedside debridement 11/10 -Xray negative for fracture On cefepime. Acute on chronic renal failure: Multifactorial likely secondary to hypovolemia due to bleed 
-hold diuretics 
-nephrology and Advanced heart failure following   
-continue IVFs Metabolic encephalopathy:  
-ammonia negative, 
-she is alert and oriented today 
  
Hyperkalemia: resolved, due to ROCIO 
-s/p regular insulin plus glucose, calcium gluconate, amp of bicarb 
  
Chronic systolic heart failure NYHA class IV, ischemic cardiomyopathy status post LVAD Hold Bumex due to ROCIO. Continue with Coreg 12.5 mg twice daily. INR stable, will monitor Not on Entresto ACE inhibitor, ARB is due to CKD 
-Advanced heart failure following NSVT: recurrent 
-previously on mexiltine but patient reports dizziness 
-EP consulted. Placed on ranexa Chronic LVAD infection:  
-ID consulted, continues on cefepime 
  
Diabetes mellitus type 2: 
Sliding scale insulin before meals and at bedtime. 
  
COPD: stable-Continue maintenance inhaled steroids. Bronchodilators as needed. 
  
Depression: stable.  Continue Paxil Morbid obesity: Body mass index is 46.34 kg/m².  OP management 
  
Code status: full DVT prophylaxis: theurapeutic INR Care Plan discussed with: Patient/Family Anticipated Disposition: Home w/Family Anticipated Discharge: Greater than 48 hours Hospital Problems  Date Reviewed: 10/28/2020 Codes Class Noted POA Coagulopathy ([facility].) ICD-10-CM: A23.0 ICD-9-CM: 286.9  11/10/2020 Unknown Open wound of left lower leg ICD-10-CM: M02.032Q ICD-9-CM: 891.0  11/10/2020 Unknown Anemia ICD-10-CM: D64.9 ICD-9-CM: 285.9  11/9/2020 Unknown ROCIO (acute kidney injury) ([facility] ICD-10-CM: N17.9 ICD-9-CM: 584.9  11/9/2020 Unknown NICM (nonischemic cardiomyopathy) ([facility] ICD-10-CM: I42.8 ICD-9-CM: 425.4  11/4/2020 Yes Chronic venous insufficiency ICD-10-CM: I87.2 ICD-9-CM: 459.81  11/4/2020 Yes Acute on chronic systolic CHF (congestive heart failure) (HCC) ICD-10-CM: Y35.99 ICD-9-CM: 428.23, 428.0  10/27/2020 Yes Obesity, morbid ([facility] ICD-10-CM: E66.01 
ICD-9-CM: 278.01  10/1/2020 Yes Review of Systems:  
Negative unless stated above Vital Signs:  
 Last 24hrs VS reviewed since prior progress note. Most recent are: 
Visit Vitals Pulse 74 Temp 98.5 °F (36.9 °C) Resp 20 Wt 133 kg (293 lb 3.4 oz) SpO2 96% BMI 53.63 kg/m² Intake/Output Summary (Last 24 hours) at 11/13/2020 0044 Last data filed at 11/12/2020 2006 Gross per 24 hour Intake 880 ml Output 500 ml Net 380 ml Physical Examination:  
 
 
 
     
Constitutional:  no acute distress ENT:  Oral mucosa moist, oropharynx benign. Resp:  CTA bilaterally. CV:  LVAD hum, no murmurs, gallops, rubs GI:  Soft, non distended, obese, non tender. normoactive bowel sounds, no hepatosplenomegaly Musculoskeletal:  No edema, left leg wrapped, reviewed open wound pictures Neurologic:  Moves all extremities, random twitches in extremities, more prominent in lower extremities Alert and awake. Data Review:  
 Review and/or order of clinical lab test 
Review and/or order of tests in the radiology section of CPT Review and/or order of tests in the medicine section of CPT Labs:  
 
Recent Labs 11/12/20 
0544 11/11/20 
1216 11/11/20 
0550 WBC 6.5  --  9.6 HGB 7.4* 7.0* 7.4* HCT 24.3* 22.6* 23.9*  
*  --  140* Recent Labs 11/12/20 
2836 11/11/20 
0549 11/10/20 
3230  138 137  
K 4.6 4.6 5.0  
 103 102 CO2 26 27 28 BUN 96* 98* 94* CREA 3.88* 4.68* 4.89* * 171* 174* CA 8.7 8.4* 8.6 MG 3.3*  --   --   
PHOS  --  5.2*  --   
URICA 12.4*  --   --   
 
Recent Labs 11/12/20 
5486 11/11/20 
0549 11/10/20 
5659 ALT 10* 11* 8* AP 69 67 69 TBILI 0.5 0.4 0.3 TP 6.8 6.7 6.7 ALB 3.0* 2.8* 2.9*  
GLOB 3.8 3.9 3.8 Recent Labs 11/12/20 
4540 11/11/20 
0549 11/10/20 
8664 INR 2.3* 2.3* 2.4* PTP 22.7* 23.6* 24.5* Recent Labs 11/12/20 
1446 11/12/20 
0544 TIBC 197*  --   
PSAT 13*  --   
FERR  --  431* Lab Results Component Value Date/Time Folate 10.0 10/28/2020 03:56 AM  
  
No results for input(s): PH, PCO2, PO2 in the last 72 hours. No results for input(s): CPK, CKNDX, TROIQ in the last 72 hours. No lab exists for component: CPKMB Lab Results Component Value Date/Time Cholesterol, total 192 10/01/2020 12:00 AM  
 HDL Cholesterol 29 (L) 10/01/2020 12:00 AM  
 LDL Chol Calc (NIH) 123 (H) 10/01/2020 12:00 AM  
 Triglyceride 222 (H) 10/01/2020 12:00 AM  
 
Lab Results Component Value Date/Time Glucose (POC) 178 (H) 11/12/2020 09:58 PM  
 Glucose (POC) 130 (H) 11/12/2020 05:13 PM  
 Glucose (POC) 185 (H) 11/12/2020 11:55 AM  
 Glucose (POC) 134 (H) 11/12/2020 07:04 AM  
 Glucose (POC) 158 (H) 11/11/2020 09:41 PM  
 
Lab Results Component Value Date/Time Color Yellow/Straw 11/09/2020 12:05 AM  
 Appearance Turbid (A) 11/09/2020 12:05 AM  
 Specific gravity 1.013 11/09/2020 12:05 AM  
 pH (UA) 5.0 11/09/2020 12:05 AM  
 Protein 100 (A) 11/09/2020 12:05 AM  
 Glucose Negative 11/09/2020 12:05 AM  
 Ketone Negative 11/09/2020 12:05 AM  
 Bilirubin Negative 11/09/2020 12:05 AM  
 Urobilinogen 0.1 11/09/2020 12:05 AM  
 Nitrites Negative 11/09/2020 12:05 AM  
 Leukocyte Esterase Trace (A) 11/09/2020 12:05 AM  
 Bacteria Negative 11/09/2020 12:05 AM  
 WBC 0-5 11/09/2020 12:05 AM  
 RBC 0-5 11/09/2020 12:05 AM  
 
 
 
Medications Reviewed:  
 
Current Facility-Administered Medications Medication Dose Route Frequency  0.9% sodium chloride infusion  75 mL/hr IntraVENous CONTINUOUS  
 cefepime (MAXIPIME) 1 g in 0.9% sodium chloride (MBP/ADV) 50 mL MBP  1 g IntraVENous Q24H  Warfarin- MD/NP to Dose    Other Rx Dosing/Monitoring  0.9% sodium chloride infusion 250 mL  250 mL IntraVENous PRN  
 ranolazine ER (RANEXA) tablet 500 mg  500 mg Oral BID  [Held by provider] carvediloL (COREG) tablet 12.5 mg  12.5 mg Oral BID WITH MEALS  ferrous sulfate tablet 325 mg  325 mg Oral DAILY WITH BREAKFAST  docusate sodium (COLACE) capsule 100 mg  100 mg Oral BID  
 gabapentin (NEURONTIN) capsule 300 mg  300 mg Oral DAILY  hydrOXYzine HCL (ATARAX) tablet 10 mg  10 mg Oral TID PRN  
 PARoxetine (PAXIL) tablet 30 mg  30 mg Oral DAILY  glucose chewable tablet 16 g  4 Tab Oral PRN  
 glucagon (GLUCAGEN) injection 1 mg  1 mg IntraMUSCular PRN  
 dextrose 10% infusion 0-250 mL  0-250 mL IntraVENous PRN  
 insulin lispro (HUMALOG) injection   SubCUTAneous AC&HS  
 0.9% sodium chloride infusion 250 mL  250 mL IntraVENous PRN  
 sodium chloride (NS) flush 5-40 mL  5-40 mL IntraVENous Q8H  
 sodium chloride (NS) flush 5-40 mL  5-40 mL IntraVENous PRN  
 acetaminophen (TYLENOL) tablet 650 mg  650 mg Oral Q6H PRN Or  
 acetaminophen (TYLENOL) suppository 650 mg  650 mg Rectal Q6H PRN  polyethylene glycol (MIRALAX) packet 17 g  17 g Oral DAILY PRN  promethazine (PHENERGAN) tablet 12.5 mg  12.5 mg Oral Q6H PRN Or  
 ondansetron (ZOFRAN) injection 4 mg  4 mg IntraVENous Q6H PRN  
 
______________________________________________________________________ EXPECTED LENGTH OF STAY: 4d 0h 
ACTUAL LENGTH OF STAY:          4 Chanelle Fox MD

## 2020-11-14 NOTE — PROGRESS NOTES
[facility] 
 [address] [facility], [address] [facility] Phone: [phone]  
  
Nephrology Progress Note 
Sedrick Faust     1950     [id number] Date of Admission : 11/9/2020 11/14/20 CC: Follow up for ARF Assessment and Plan ROCIO on CKD 
- Possible ATN from IV volume depletion, blood loss. - No obstruction on imaging, urine eos neg 
- UOP and Cr improving  
- sob and will add bumex  
- no urgent need for RRT at this time 
  
CKD stage III. -Baseline creatinine prior to last admission was 1.3 mg/dL 
-Creatinine close to 2.0-2.1 when she was euvolemic. 
-Etiology of CKD is likely diabetes hypertension and cardiomyopathy. However she does have a positive PYP testing which raises the possibility of amyloidosis. 
  
Chronic systolic CHF, stage C NYHA class IV 
-Combined ischemic and nonischemic cardiomyopathy 
-S/p HM 2 LVAD implant 4/15/2017 by Dr. Emeka Adan at [facility] 
- hx of recurrent VT : off mexiletine 
-Recent acute on chronic RV failure. -Recent sternal wound infection with staph aureus and Morganella. Has LVAD site drainage. 
  
Left Calf laceration Blood loss anemia - per primary team/ surgery Severe COPD. Pulmonary hypertension. Chronic A. fib. History of endometrial answer ATTR amyloidosis Interval History: 
Seen and examined. Awake,sob  And has wheeze on exam . Cr down to 2.6, UOP 1 .3 L in the past 24 hrs. No reported cp, sob, n/v/d. Review of Systems: A comprehensive review of systems was negative except for that written in the HPI. Current Medications:  
Current Facility-Administered Medications Medication Dose Route Frequency  [START ON 11/15/2020] PARoxetine (PAXIL) tablet 20 mg  20 mg Oral DAILY  Warfarin Pharmacy Dosing   Other PRN  
 ranolazine ER (RANEXA) tablet 1,000 mg  1,000 mg Oral BID  iron sucrose (VENOFER) 200 mg in 0.9% sodium chloride 100 mL IVPB  200 mg IntraVENous Q24H  cefepime (MAXIPIME) 1 g in 0.9% sodium chloride (MBP/ADV) 50 mL MBP  1 g IntraVENous Q24H  Warfarin- MD/NP to Dose    Other Rx Dosing/Monitoring  ferrous sulfate tablet 325 mg  325 mg Oral DAILY WITH BREAKFAST  docusate sodium (COLACE) capsule 100 mg  100 mg Oral BID  hydrOXYzine HCL (ATARAX) tablet 10 mg  10 mg Oral TID PRN  
 glucose chewable tablet 16 g  4 Tab Oral PRN  
 glucagon (GLUCAGEN) injection 1 mg  1 mg IntraMUSCular PRN  
 dextrose 10% infusion 0-250 mL  0-250 mL IntraVENous PRN  
 insulin lispro (HUMALOG) injection   SubCUTAneous AC&HS  sodium chloride (NS) flush 5-40 mL  5-40 mL IntraVENous Q8H  
 sodium chloride (NS) flush 5-40 mL  5-40 mL IntraVENous PRN  
 acetaminophen (TYLENOL) tablet 650 mg  650 mg Oral Q6H PRN Or  
 acetaminophen (TYLENOL) suppository 650 mg  650 mg Rectal Q6H PRN  polyethylene glycol (MIRALAX) packet 17 g  17 g Oral DAILY PRN  promethazine (PHENERGAN) tablet 12.5 mg  12.5 mg Oral Q6H PRN Or  
 ondansetron (ZOFRAN) injection 4 mg  4 mg IntraVENous Q6H PRN No Known Allergies Objective: 
Vitals:   
Vitals:  
 11/14/20 0824 11/14/20 1134 11/14/20 1510 11/14/20 1535 Pulse: 78 88 89 Resp: 18 18 18 Temp: 98.1 °F (36.7 °C) 98.6 °F (37 °C) 98.5 °F (36.9 °C) SpO2: 93% 91% 92% 98% Weight:      
 
Intake and Output: 
11/14 0701 - 11/14 1900 In: 300 [P.O.:250; I.V.:50] Out: 950 [Urine:950] 11/12 1901 - 11/14 0700 In: 2030 [P.O.:1080; I.V.:950] Out: 1948 [RZHAP:6387] Physical Examination: 
General: Morbidly obese, in NAD HEENT: PERRL, EOMI Neck: Supple,no mass palpable Lungs : CTA 
CVS: RRR, VAD sounds Abdomen: Soft, NT, BS +, obese Extremities: trace b/l LE edema Skin: No rash or lesions. MS: No joint swelling, erythema, warmth Neurologic: awake and alert [x]    High complexity decision making was performed 
[x]    Patient is at high-risk of decompensation with multiple organ involvement Lab Data Personally Reviewed: I have reviewed all the pertinent labs, microbiology data and radiology studies during assessment. Recent Labs 11/14/20 
4780 11/13/20 2207 11/13/20 
1524 11/12/20 
0544 11/12/20 
7206   --  139  --  140  
K 5.1  --  4.6  --  4.6 *  --  108  --  108 CO2 25  --  25  --  26 *  --  213*  --  113* BUN 69*  --  75*  --  96* CREA 2.69*  --  3.08*  --  3.88* CA 8.6  --  8.4*  --  8.7 MG 2.7* 2.9*  --   --  3.3* ALB 2.9*  --  2.8*  --  3.0* ALT 12  --  10*  --  10* INR 2.0*  --  2.0* 2.3*  --   
 
Recent Labs 11/14/20 
2460 11/13/20 
2224 11/12/20 
0544 WBC 5.5 6.7 6.5 HGB 8.2* 7.8* 7.4* HCT 26.4* 25.1* 24.3*  
 136* 123* No results found for: SDES Lab Results Component Value Date/Time Culture result: NO GROWTH 4 DAYS 11/10/2020 06:42 PM  
 Culture result: No Growth (<1000 cfu/mL) 11/09/2020 12:05 AM  
 Culture result: SCANT Morganella morganii ssp morganii (A) 10/30/2020 11:30 AM  
 Culture result: SCANT Morganella morganii ssp morganii (A) 10/30/2020 11:30 AM  
 Culture result: RARE DIPHTHEROIDS (A) 10/30/2020 11:30 AM  
 
Recent Results (from the past 24 hour(s)) GLUCOSE, POC Collection Time: 11/13/20  3:57 PM  
Result Value Ref Range Glucose (POC) 191 (H) 65 - 100 mg/dL Performed by Sarah Maldonado GLUCOSE, POC Collection Time: 11/13/20 10:22 PM  
Result Value Ref Range Glucose (POC) 211 (H) 65 - 100 mg/dL Performed by Damian Dickerson   
METABOLIC PANEL, COMPREHENSIVE Collection Time: 11/14/20  4:33 AM  
Result Value Ref Range Sodium 142 136 - 145 mmol/L Potassium 5.1 3.5 - 5.1 mmol/L Chloride 110 (H) 97 - 108 mmol/L  
 CO2 25 21 - 32 mmol/L Anion gap 7 5 - 15 mmol/L Glucose 130 (H) 65 - 100 mg/dL BUN 69 (H) 6 - 20 MG/DL Creatinine 2.69 (H) 0.55 - 1.02 MG/DL  
 BUN/Creatinine ratio 26 (H) 12 - 20 GFR est AA 21 (L) >60 ml/min/1.73m2 GFR est non-AA 18 (L) >60 ml/min/1.73m2 Calcium 8.6 8.5 - 10.1 MG/DL Bilirubin, total 0.4 0.2 - 1.0 MG/DL  
 ALT (SGPT) 12 12 - 78 U/L  
 AST (SGOT) 28 15 - 37 U/L Alk. phosphatase 73 45 - 117 U/L Protein, total 7.2 6.4 - 8.2 g/dL Albumin 2.9 (L) 3.5 - 5.0 g/dL Globulin 4.3 (H) 2.0 - 4.0 g/dL A-G Ratio 0.7 (L) 1.1 - 2.2 LACTIC ACID Collection Time: 11/14/20  4:33 AM  
Result Value Ref Range Lactic acid 0.4 0.4 - 2.0 MMOL/L  
LD Collection Time: 11/14/20  4:33 AM  
Result Value Ref Range  81 - 246 U/L  
PROTHROMBIN TIME + INR Collection Time: 11/14/20  4:33 AM  
Result Value Ref Range INR 2.0 (H) 0.9 - 1.1 Prothrombin time 20.0 (H) 9.0 - 11.1 sec CBC W/O DIFF Collection Time: 11/14/20  4:33 AM  
Result Value Ref Range WBC 5.5 3.6 - 11.0 K/uL  
 RBC 2.87 (L) 3.80 - 5.20 M/uL HGB 8.2 (L) 11.5 - 16.0 g/dL HCT 26.4 (L) 35.0 - 47.0 % MCV 92.0 80.0 - 99.0 FL  
 MCH 28.6 26.0 - 34.0 PG  
 MCHC 31.1 30.0 - 36.5 g/dL  
 RDW 17.3 (H) 11.5 - 14.5 % PLATELET 485 424 - 651 K/uL MPV 10.9 8.9 - 12.9 FL  
 NRBC 0.0 0  WBC ABSOLUTE NRBC 0.00 0.00 - 0.01 K/uL MAGNESIUM Collection Time: 11/14/20  4:33 AM  
Result Value Ref Range Magnesium 2.7 (H) 1.6 - 2.4 mg/dL NT-PRO BNP Collection Time: 11/14/20  4:33 AM  
Result Value Ref Range NT pro-BNP 12,685 (H) <125 PG/ML  
GLUCOSE, POC Collection Time: 11/14/20  6:56 AM  
Result Value Ref Range Glucose (POC) 151 (H) 65 - 100 mg/dL Performed by Dru Andrews GLUCOSE, POC Collection Time: 11/14/20 11:25 AM  
Result Value Ref Range Glucose (POC) 171 (H) 65 - 100 mg/dL Performed by Aleksandr Mackey EKG, 12 LEAD, INITIAL Collection Time: 11/14/20 12:08 PM  
Result Value Ref Range Ventricular Rate 76 BPM  
 Atrial Rate 42 BPM  
 QRS Duration 136 ms  
 Q-T Interval 434 ms QTC Calculation (Bezet) 488 ms Calculated P Axis 90 degrees Calculated R Axis -68 degrees Calculated T Axis 91 degrees Diagnosis Marked sinus bradycardia with AV dissociation and Wide QRS rhythm with  
occasional premature ventricular complexes Left axis deviation Nonspecific intraventricular block Inferior infarct , age undetermined Anterolateral infarct , age undetermined When compared with ECG of 13-NOV-2020 12:27, 
MANUAL COMPARISON REQUIRED, DATA IS UNCONFIRMED Total time spent with patient:  xxx   min. Care Plan discussed with: 
Patient Family RN Consulting Physician [address] [facility],      
 
I have reviewed the flowsheets. Chart and Pertinent Notes have been reviewed. No change in PMH ,family and social history from Consult note.  
 
 
Tejinder Rowe MD

## 2020-11-14 NOTE — PROGRESS NOTES
0730: Bedside and Verbal shift change report given to vijay bustamante (oncoming nurse) by Kip Sousa (offgoing nurse). Report included the following information SBAR, Kardex, Intake/Output, MAR, Recent Results and Cardiac Rhythm paced. 1040: RN observed pt attempt to transfer from wall to battery power with Physical therapy. Pt needed assistance disconnecting and reconnecting power cords, reminders to first transfer white cord then black cord. Phys therapy placed pt in chair x1 assist 
 
1150: RN attempted to do ordered EKG. monitor displayed \"power cord interference\", so accuracy is unknown. EKG placed on chart and transmitted to system 1539: notified by PAULINE Spain MD of pt reporting SOB. Upon entering, pt noted to have audible wheezing, O2 sat stable >90% on RA. RN encouraged pt to cough to clear secretions. Pt had a short episode of V tach with coughing. Respiratory paged for PRN breathing treatment 1815: Attempted stand-pivot to recliner with 2 person assist, rollator, and gait belt but pt was unable to stand due to tremors, knees buckling, increased HAIRSTON, tremors, and weakness from earlier work with Phys therapy. Multiple runs of v tach noted during attempted ambulation and immediately after. Melly Marie MD paged, upon return call RN requested for attending to reassess the pt.  
 
1930: Bedside and Verbal shift change report given to [address] [place] [address],[address] (oncoming nurse) by Brigette Koehler (offgoing nurse). Report included the following information SBAR, Kardex, Intake/Output, MAR and Recent Results. Problem: Diabetes Self-Management Goal: *Disease process and treatment process Description: Define diabetes and identify own type of diabetes; list 3 options for treating diabetes. Outcome: Progressing Towards Goal 
Goal: *Incorporating nutritional management into lifestyle Description: Describe effect of type, amount and timing of food on blood glucose; list 3 methods for planning meals. Outcome: Progressing Towards Goal 
Goal: *Incorporating physical activity into lifestyle Description: State effect of exercise on blood glucose levels. Outcome: Progressing Towards Goal 
Goal: *Developing strategies to promote health/change behavior Description: Define the ABC's of diabetes; identify appropriate screenings, schedule and personal plan for screenings. Outcome: Progressing Towards Goal 
Goal: *Using medications safely Description: State effect of diabetes medications on diabetes; name diabetes medication taking, action and side effects. Outcome: Progressing Towards Goal 
Goal: *Monitoring blood glucose, interpreting and using results Description: Identify recommended blood glucose targets  and personal targets. Outcome: Progressing Towards Goal 
Goal: *Prevention, detection, treatment of acute complications Description: List symptoms of hyper- and hypoglycemia; describe how to treat low blood sugar and actions for lowering  high blood glucose level. Outcome: Progressing Towards Goal 
Goal: *Developing strategies to address psychosocial issues Description: Describe feelings about living with diabetes; identify support needed and support network Outcome: Progressing Towards Goal 
Goal: *Insulin pump training Outcome: Progressing Towards Goal 
  
Problem: Patient Education: Go to Patient Education Activity Goal: Patient/Family Education Outcome: Progressing Towards Goal 
  
Problem: Falls - Risk of 
Goal: *Absence of Falls Description: Document Morton Pipes Fall Risk and appropriate interventions in the flowsheet. Outcome: Progressing Towards Goal 
Note: Fall Risk Interventions: 
Mobility Interventions: Patient to call before getting OOB, Communicate number of staff needed for ambulation/transfer Medication Interventions: Teach patient to arise slowly, Patient to call before getting OOB Elimination Interventions: Patient to call for help with toileting needs, Call light in reach History of Falls Interventions: Door open when patient unattended, Bed/chair exit alarm Problem: Patient Education: Go to Patient Education Activity Goal: Patient/Family Education Outcome: Progressing Towards Goal 
  
Problem: Pressure Injury - Risk of 
Goal: *Prevention of pressure injury Description: Document Jaime Scale and appropriate interventions in the flowsheet. Outcome: Progressing Towards Goal 
Note: Pressure Injury Interventions: 
Sensory Interventions: Pressure redistribution bed/mattress (bed type) Moisture Interventions: Absorbent underpads, Internal/External urinary devices Activity Interventions: Increase time out of bed, Pressure redistribution bed/mattress(bed type) Mobility Interventions: Pressure redistribution bed/mattress (bed type) Nutrition Interventions: Document food/fluid/supplement intake Friction and Shear Interventions: Apply protective barrier, creams and emollients, HOB 30 degrees or less, Lift sheet Problem: Patient Education: Go to Patient Education Activity Goal: Patient/Family Education Outcome: Progressing Towards Goal 
  
Problem: Patient Education: Go to Patient Education Activity Goal: Patient/Family Education Outcome: Progressing Towards Goal 
  
Problem: Heart Failure: Day 5 Goal: Activity/Safety Outcome: Progressing Towards Goal 
Goal: Diagnostic Test/Procedures Outcome: Progressing Towards Goal 
Goal: Nutrition/Diet Outcome: Progressing Towards Goal 
Goal: Discharge Planning Outcome: Progressing Towards Goal 
Goal: Medications Outcome: Progressing Towards Goal 
Goal: Respiratory Outcome: Progressing Towards Goal 
Goal: Treatments/Interventions/Procedures Outcome: Progressing Towards Goal 
Goal: Psychosocial 
Outcome: Progressing Towards Goal

## 2020-11-14 NOTE — CONSULTS
INPATIENT NEUROLOGY CONSULTATION 
11/14/2020 Consulted by: Carolina Bills MD   
 
 
Patient ID: 
Lawyer Machuca [id number] 
78 y.o. 
1950 CC: Abnormal movements HPI Aristeo Griffin is a 45-year-old woman who has advanced heart failure with LVAD, diabetes and associated neuropathy, renal dysfunction, hypertension who was admitted to the hospital on November 9 after having a fall with increasing fatigue. I spoke with her personally and reviewed the medical record. Her hospital course has been remarkable for left lower extremity wound debridement requiring cefepime IV. She is also had some toxic metabolic encephalopathy while admitted which now has resolved and she is back to her baseline. Head CT did not show any acute issue but there was a very small right incidental meningioma noted. Neurology was consulted because she continues to have involuntary movements of the body. She denies any unusual numbness or weakness. She has no pain at the wound in question. Review of Systems Eyes: Negative for double vision. Neurological: Positive for sensory change. Twitching All other systems reviewed and are negative. Past Medical History:  
Diagnosis Date  Asthma  Cancer ([facility] breast  
 Cancer ([facility]   
 endometrial  
 Congestive heart failure, unspecified  CRI (chronic renal insufficiency)  Depression  Diabetes ([facility]  Hypertension History reviewed. No pertinent family history. Social History Socioeconomic History  Marital status:  Spouse name: Not on file  Number of children: Not on file  Years of education: Not on file  Highest education level: Not on file Occupational History  Not on file Social Needs  Financial resource strain: Not on file  Food insecurity Worry: Not on file Inability: Not on file  Transportation needs Medical: Not on file Non-medical: Not on file Tobacco Use  
  Smoking status: Never Smoker  Smokeless tobacco: Never Used Substance and Sexual Activity  Alcohol use: Not Currently  Drug use: Not on file  Sexual activity: Not on file Lifestyle  Physical activity Days per week: Not on file Minutes per session: Not on file  Stress: Not on file Relationships  Social connections Talks on phone: Not on file Gets together: Not on file Attends Yazdanism service: Not on file Active member of club or organization: Not on file Attends meetings of clubs or organizations: Not on file Relationship status: Not on file  Intimate partner violence Fear of current or ex partner: Not on file Emotionally abused: Not on file Physically abused: Not on file Forced sexual activity: Not on file Other Topics Concern  Not on file Social History Narrative  Not on file Current Facility-Administered Medications Medication Dose Route Frequency  [START ON 11/15/2020] PARoxetine (PAXIL) tablet 20 mg  20 mg Oral DAILY  ranolazine ER (RANEXA) tablet 1,000 mg  1,000 mg Oral BID  iron sucrose (VENOFER) 200 mg in 0.9% sodium chloride 100 mL IVPB  200 mg IntraVENous Q24H  cefepime (MAXIPIME) 1 g in 0.9% sodium chloride (MBP/ADV) 50 mL MBP  1 g IntraVENous Q24H  Warfarin- MD/NP to Dose    Other Rx Dosing/Monitoring  ferrous sulfate tablet 325 mg  325 mg Oral DAILY WITH BREAKFAST  docusate sodium (COLACE) capsule 100 mg  100 mg Oral BID  hydrOXYzine HCL (ATARAX) tablet 10 mg  10 mg Oral TID PRN  
 glucose chewable tablet 16 g  4 Tab Oral PRN  
 glucagon (GLUCAGEN) injection 1 mg  1 mg IntraMUSCular PRN  
 dextrose 10% infusion 0-250 mL  0-250 mL IntraVENous PRN  
 insulin lispro (HUMALOG) injection   SubCUTAneous AC&HS  sodium chloride (NS) flush 5-40 mL  5-40 mL IntraVENous Q8H  
 sodium chloride (NS) flush 5-40 mL  5-40 mL IntraVENous PRN  
  acetaminophen (TYLENOL) tablet 650 mg  650 mg Oral Q6H PRN Or  
 acetaminophen (TYLENOL) suppository 650 mg  650 mg Rectal Q6H PRN  polyethylene glycol (MIRALAX) packet 17 g  17 g Oral DAILY PRN  promethazine (PHENERGAN) tablet 12.5 mg  12.5 mg Oral Q6H PRN Or  
 ondansetron (ZOFRAN) injection 4 mg  4 mg IntraVENous Q6H PRN No Known Allergies Visit Vitals Pulse 88 Temp 98.6 °F (37 °C) Resp 18 Wt 130.9 kg (288 lb 9.6 oz) SpO2 91% BMI 52.79 kg/m² Physical Exam  
Constitutional: She appears well-developed and well-nourished. Cardiovascular: Normal rate. Pulmonary/Chest: Effort normal.  
Skin: Skin is warm and dry. There is pallor. Vitals reviewed. Neurologic Exam  
 
Mental Status Adult woman in bed who wakes up easily. She is pleasant. She can follow commands but she is hearing impaired. Pupils are equal but appear disconjugate in primary gaze however she denies diplopia Face is grossly symmetric tongue is midline speech is clear She is globally weak I would grade 4+ throughout but she can activate both lower extremities against gravity in bed. She can lift both legs. She has a wound to the left leg below the knee. Feet feel numb. Intermittently she will display head twitching or shoulder twitching or abdominal twitching nonrhythmic Gait deferred due to condition Lab Results Component Value Date/Time WBC 5.5 11/14/2020 04:33 AM  
 HGB 8.2 (L) 11/14/2020 04:33 AM  
 HCT 26.4 (L) 11/14/2020 04:33 AM  
 PLATELET 145 49/02/1563 04:33 AM  
 MCV 92.0 11/14/2020 04:33 AM  
 
Lab Results Component Value Date/Time Hemoglobin A1c 8.4 (H) 10/28/2020 03:30 AM  
 Glucose 130 (H) 11/14/2020 04:33 AM  
 Glucose (POC) 171 (H) 11/14/2020 11:25 AM  
 LDL Chol Calc (NIH) 123 (H) 10/01/2020 12:00 AM  
 Creatinine 2.69 (H) 11/14/2020 04:33 AM  
  
Lab Results Component Value Date/Time  Cholesterol, total 192 10/01/2020 12:00 AM  
 HDL Cholesterol 29 (L) 10/01/2020 12:00 AM  
 LDL Chol Calc (NIH) 123 (H) 10/01/2020 12:00 AM  
 Triglyceride 222 (H) 10/01/2020 12:00 AM  
 
Lab Results Component Value Date/Time ALT (SGPT) 12 11/14/2020 04:33 AM  
 Alk. phosphatase 73 11/14/2020 04:33 AM  
 Bilirubin, total 0.4 11/14/2020 04:33 AM  
 Albumin 2.9 (L) 11/14/2020 04:33 AM  
 Protein, total 7.2 11/14/2020 04:33 AM  
 Ammonia 24 11/11/2020 12:16 PM  
 INR 2.0 (H) 11/14/2020 04:33 AM  
 Prothrombin time 20.0 (H) 11/14/2020 04:33 AM  
 PLATELET 483 86/36/1949 04:33 AM  
  
 
CT Results (maximum last 3): Results from Hospital Encounter encounter on 11/09/20 CT HEAD WO CONT Narrative EXAM: CT HEAD WO CONT INDICATION: AMS, involuntary muscular movement COMPARISON: None. CONTRAST: None. TECHNIQUE: Unenhanced CT of the head was performed using 5 mm images. Brain and 
bone windows were generated. Coronal and sagittal reformats. CT dose reduction 
was achieved through use of a standardized protocol tailored for this 
examination and automatic exposure control for dose modulation. FINDINGS: 
Ventricles and sulci are within normal limits. No hemorrhage or acute infarction 
is identified. Is 1.1 cm high density lesion based against the posterior frontal 
bone most likely meningioma. The bone windows demonstrate no abnormalities. The visualized portions of the 
paranasal sinuses and mastoid air cells are clear. Impression IMPRESSION:  
No acute abnormality identified. Incidentally noted is a 1.1 cm meningioma. Results [place] [facility] [place] on 11/08/20 CT ABD PELV WO CONT Narrative PROCEDURE: CT ABD PELV WO CONT 
 
HISTORY:Flank pain COMPARISON:None Department policy stipulates all CT scans at this facility are performed using 
dose reduction optimization techniques as appropriate to the performed exam, 
including the following: Automated exposure control, adjustments of the mA and/or KVP according to the patient size, and the use of iterative 
reconstruction technique. LIMITATIONS: Respiratory motion in the upper abdomen TECHNIQUE: Axial images with multiplanar reconstruction. No IV contrast. 
 
CHEST: No acute airspace process or pleural effusion seen at the lung bases. Heart remains enlarged with left ventricular assist device in place. LIVER: Normal 
GALLBLADDER: Normal 
BILIARY TREE: Normal 
PANCREAS: Normal 
SPLEEN: Normal 
ADRENAL GLANDS: Normal 
KIDNEYS/URETERS: Both kidneys show an element of cortical thinning. No 
hydronephrosis, stone disease or renal mass. Urinary bladder is unremarkable RETROPERITONEUM/AORTA: Atherosclerotic changes in the aorta and its branches. No 
aneurysm or periaortic pathology BOWEL/MESENTERY: Stomach and small bowel are unremarkable. The colon shows mild 
diverticulosis without evidence of diverticulitis. The rectum is somewhat 
distended with fecal material. There is no bowel wall thickening. There is no 
dilatation of the colon proximal to the rectum. APPENDIX: Identified and normal 
PERITONEAL CAVITY: No free air or fluid REPRODUCTIVE: Removed BONE/TISSUES: No acute process. On the right there is mild stranding in the 
subcutaneous fat along the lateral abdominal wall at the level of the lower 
margin of the rib cage down to the iliac region. No similar finding is seen on 
the left. OTHER: None Impression IMPRESSION: No specific acute abnormality. Distended stool-filled rectum. Impaction? No evidence of obstruction proximal to 
this point. Mild colonic diverticulosis. Minimal subcutaneous fat changes right lateral abdominal wall. This may 
represent benign dependent changes if the patient lays on her right. A mild 
contusion could also give this appearance. Correlate with history and physical 
exam.  
 
 
 
  
 
 
MRI Results (maximum last 3): No results found for this or any previous visit. VAS/US/Carotid Doppler Results (maximum last 3): No results found for this or any previous visit. PET Results (maximum last 3): No results found for this or any previous visit. Assessment and Plan 28-year-old woman with multiple chronic medical conditions notably advanced heart failure who has been having involuntary movements. I was able to witness these events at the bedside prior to talking with her. She has intermittent nonrhythmic myoclonic twitching. No change in mentation. I do not think this is epileptic. I have suspicion this could be related to gabapentin in the setting of heart failure and renal dysfunction. I am going to stop that medication. It seems that she is taking that for diabetic neuropathy but she is having more numbness than neuropathic pain at this time. I am also going to reduce Paxil to 20 mg.  I discussed this with her. She understands why redoing this. We will follow-up. This clinical note was dictated with an electronic dictation software that can make unintentional errors. If there are any questions, please contact me directly for clarification. 2 [facility],  
NEUROLOGIST Diplomate TERRA 
11/14/2020

## 2020-11-14 NOTE — PROGRESS NOTES
[address] in [place], [place] Inpatient Progress Note Patient name: Nataly Murphy Patient : 1950 Patient MRN: [id number] Attending MD: Isidro Gonzalez MD 
Date of service: 20 CHIEF COMPLAINT: 
Cellulitis 
  
PLAN: 
Continue current device speed 9400 with low speed limit of 9000; intolerant to higher speed due to VT Expect increasing intolerance to BB/ACEi/ARB/ARNI due to aTTR; on hold for now Application for patient assistance for tafamidis as OP Intolerant of spironolactone due to hyperkalemia Diuretic management per nephrology Coumadin per pharmacy; goal INR 1.8-2.5 Transfuse to keep HGB>7 Antibiotics per ID Use home CPAP 
DTC consult appreciated PT/OT Nutrition consult GI, urogynecology, and pulmonary consults as OP  
  
IMPRESSION: 
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 
C/b cardiogenic shock s/p Impella bridge to LVAD S/p HeartMate 2 LVAD implantation (17 by Dr. James Jones) · C/b delayed extubation due to severe COPD 
· C/b critical illness polyneuropathy · C/b prolonged hospitalization post-LVAD, 55 days · C/b sternal wound infection s/p debridement (by Dr. Benjy Townsend) s/p wound vac · C/b sacral ulcer · Would culture positive for Staph aureus, not MRSA · C/b LVAD site drainage, improved S/p CRT-ICD · ICD fired due to electrolyte imbalance () H/o breast cancer () · s/p bilateral mastectomy/chemo and endometrial cancer s/p hysterectomy · Lymphedema of LLE due to cancer treatment Severe COPD with FEV1 50% Depression Atrial fibrillation H/o \"two mini strokes\" S/p fall with hip facture · Right hip hemmiarthroplasty (18) by Dr. Opal Sahu) · S/p removed hip hardwarare due to pain (4/15/19) COPD severe CKD, stage 3 Hyperkalemia Pulmonary hypertension Cardiac risk factors: · Morbid obesity, Body mass index is 53.63 kg/m². · DM2 insulin dependent · JED on CPAP 
· HL Urinary incontinence, severe · no procedures due to anticoagulation · conservative management with Detrol 4 pills bid Endometrial cancer () HTN 
HL 
aTTR amyloidosis Left leg wound following fall Encephalopathy Renal failure  
  
CARDIAC IMAGING: 
Echo (19) LVEF 20-25%, AV opens 1:1, no AR Echo (18) LVEF 10-%, ramps study done, report in [facility] Echo (18) ramp study done, LVEDD 7.1cm LHC (18) 2 vessel disease with 90% OM, 80% PDA, DSA to PDA branch 
TTE (10/27/20) LVEF 15-20% LVIDd 7.26cm RVIDd 2.82cm Tapse 1.14cm 
  
HEMODYNAMICS: 
RHC 11/3 shows CVP 14 mmHg, PA 36/18 mmHg, PCWP 15 mmHg, Sal CI 2.4 
CPEST not done 6MW not done 
  
OTHER IMAGING: 
EGD/C-scope (19) no active bleeding and polyp removed. 
  
INTERVAL HISTORY: 
Doing well Feels great Labs improving FAMILY HISTORY: 
Mother  76 years, diagnosed with DM, HTN, CAD Father  [de-identified]years old, HTN, CAD, MI 
Positive for DM and heart disease in the family, brother with valve replacement 
  
SOCIAL HISTORY: 
Never alcohol, never smoked, , lives alone, no illicit drug use, lives in house, ambulatory status indpendedn, children: daughter, occupation retired Lives alone. 
  
ALLERGIES: benzodiazepines and quetiapine fumerate (lethargy, resp failure to both) 
  
LVAD INTERROGATION: 
Device interrogated in person Device function normal, normal flow, no events LVAD Pump Speed (RPM): 9400 Pump Flow (LPM): 4.6 MAP: 90 
PI (Pulsitility Index): 6.5 Power: 5.7  Test: No 
 Back Up  at Bedside & Labeled: Yes Power Module Test: No 
Driveline Site Care: No 
Driveline Dressing: Clean, Dry, and Intact Outpatient: No 
MAP in Therapeutic Range (Outpatient): Yes Testing Back up SC speed: 9400 Back up Low Speed Limit: 9200 PHYSICAL EXAM: 
Visit Vitals Pulse 88 Temp 98.6 °F (37 °C) Resp 18 Wt 288 lb 9.6 oz (130.9 kg) SpO2 91% BMI 52.79 kg/m² General: Patient is well developed, well-nourished in no acute distress HEENT: Normocephalic and atraumatic. No scleral icterus. Pupils are equal, round and reactive to light and accomodation. No conjunctival injection. Oropharynx is clear. Neck: Supple. No evidence of thyroid enlargements or lymphadenopathy. JVD: Cannot be appreciated Lungs: Breath sounds are equal and clear bilaterally. No wheezes, rhonchi, or rales. Heart: Regular rate and rhythm with normal S1 and S2. No murmurs, gallops or rubs. Abdomen: Soft, no mass or tenderness. No organomegaly or hernia. Bowel sounds present. Genitourinary and rectal: deferred Extremities: No cyanosis, clubbing, or edema. Neurologic: No focal sensory or motor deficits are noted. Grossly intact. Psychiatric: Awake, alert an doriented x 3. Appropriate mood and affect. Skin: Warm, dry and well perfused. No lesions, nodules or rashes are noted. REVIEW OF SYSTEMS: 
General: Denies fever, night sweats. Ear, nose and throat: Denies difficulty hearing, sinus problems, runny nose, post-nasal drip, ringing in ears, mouth sores, loose teeth, ear pain, nosebleeds, sore throate, facial pain or numbess Cardiovascular: see above in the interval history Respiratory: Denies cough, wheezing, sputum production, hemoptysis. Gastrointestinal: Denies heartburn, constipation, intolerance to certain foods, diarrhea, abdominal pain, nausea, vomiting, difficulty swallowing, blood in stool Kidney and bladder: Denies painful urination, frequent urination, urgency, prostate problems and impotence Musculoskeletal: Denies joint pain, muscle weakness Skin and hair: Denies change in existing skin lesions, hair loss or increase, breast changes PAST MEDICAL HISTORY: 
Past Medical History:  
Diagnosis Date  Asthma  [facility]  
 Cancer ([facility]   
 endometrial  
 Congestive heart failure, unspecified  CRI (chronic renal insufficiency)  Depression  Diabetes ([facility]  Hypertension PAST SURGICAL HISTORY: 
Past Surgical History:  
Procedure Laterality Date  HX HERNIA REPAIR    
 HX HYSTERECTOMY  HX MASTECTOMY FAMILY HISTORY: 
History reviewed. No pertinent family history. SOCIAL HISTORY: 
Social History Socioeconomic History  Marital status:  Spouse name: Not on file  Number of children: Not on file  Years of education: Not on file  Highest education level: Not on file Tobacco Use  Smoking status: Never Smoker  Smokeless tobacco: Never Used Substance and Sexual Activity  Alcohol use: Not Currently LABORATORY RESULTS: 
  
Labs Latest Ref Rng & Units 11/14/2020 11/13/2020 11/12/2020 11/11/2020 11/11/2020 11/11/2020 11/10/2020 WBC 3.6 - 11.0 K/uL 5.5 6.7 6.5 - 9.6 - 10.1 RBC 3.80 - 5.20 M/uL 2.87(L) 2.80(L) 2.68(L) - 2.68(L) - 2.64(L) Hemoglobin 11.5 - 16.0 g/dL 8. 2(L) 7. 8(L) 7. 4(L) 7. 0(L) 7. 4(L) - 7. 0(L) Hematocrit 35.0 - 47.0 % 26. 4(L) 25. 1(L) 24. 3(L) 22. 6(L) 23. 9(L) - 22. 7(L) MCV 80.0 - 99.0 FL 92.0 89.6 90.7 - 89.2 - 86.0 Platelets 951 - 740 K/uL 164 136(L) 123(L) - 140(L) - 144(L) Lymphocytes 12 - 49 % - - - - - - 11(L) Monocytes 5 - 13 % - - - - - - 7 Eosinophils 0 - 7 % - - - - - - 2 Basophils 0 - 1 % - - - - - - 0 Albumin 3.5 - 5.0 g/dL 2. 9(L) 2. 8(L) 3.0(L) - - 2. 8(L) -  
Calcium 8.5 - 10.1 MG/DL 8.6 8.4(L) 8.7 - - 8. 4(L) 8.6 Glucose 65 - 100 mg/dL 130(H) 213(H) 113(H) - - 171(H) 174(H) BUN 6 - 20 MG/DL 69(H) 75(H) 96(H) - - 98(H) 94(H) Creatinine 0.55 - 1.02 MG/DL 2.69(H) 3.08(H) 3.88(H) - - 4.68(H) 4.89(H) Sodium 136 - 145 mmol/L 142 139 140 - - 138 137 Potassium 3.5 - 5.1 mmol/L 5.1 4.6 4.6 - - 4.6 5.0 TSH 0.450 - 4.500 uIU/mL - - - - - - -  
LDH 81 - 246 U/L 240 219 266(H) - - - - Some recent data might be hidden Lab Results Component Value Date/Time TSH 2.980 10/01/2020 12:00 AM  
 
 
ALLERGY: 
No Known Allergies CURRENT MEDICATIONS: 
 
Current Facility-Administered Medications:  
  [START ON 11/15/2020] PARoxetine (PAXIL) tablet 20 mg, 20 mg, Oral, DAILY, Albert Akhtar DO 
  ranolazine ER (RANEXA) tablet 1,000 mg, 1,000 mg, Oral, BID, Taina Mcallister MD, 1,000 mg at 11/14/20 1139 
  iron sucrose (VENOFER) 200 mg in 0.9% sodium chloride 100 mL IVPB, 200 mg, IntraVENous, Q24H, Marie Walter NP, Last Rate: 440 mL/hr at 11/13/20 1824, 200 mg at 11/13/20 1824   cefepime (MAXIPIME) 1 g in 0.9% sodium chloride (MBP/ADV) 50 mL MBP, 1 g, IntraVENous, Q24H, Pooja Huang MD, Last Rate: 100 mL/hr at 11/14/20 1138, 1 g at 11/14/20 1138   Warfarin- MD/NP to Dose , , Other, Rx Dosing/Monitoring, Marie Walter NP 
  ferrous sulfate tablet 325 mg, 325 mg, Oral, DAILY WITH BREAKFAST, Edinson Shani M, DO, 325 mg at 11/14/20 9083   docusate sodium (COLACE) capsule 100 mg, 100 mg, Oral, BID, Edinson Shani M, DO, 100 mg at 11/14/20 3560   hydrOXYzine HCL (ATARAX) tablet 10 mg, 10 mg, Oral, TID PRN, Wilhelmena Hawking, [employer] M, DO 
  glucose chewable tablet 16 g, 4 Tab, Oral, PRN, Edinson Mcleod M, DO 
  glucagon (GLUCAGEN) injection 1 mg, 1 mg, IntraMUSCular, PRN, Edinson KATE, DO 
  dextrose 10% infusion 0-250 mL, 0-250 mL, IntraVENous, PRN, CHARLENE [employer] M, DO 
  insulin lispro (HUMALOG) injection, , SubCUTAneous, AC&HS, Edinson KATE, DO, 2 Units at 11/14/20 2509   sodium chloride (NS) flush 5-40 mL, 5-40 mL, IntraVENous, Q8H, Jaki Zaidi MD, 10 mL at 11/14/20 4796   sodium chloride (NS) flush 5-40 mL, 5-40 mL, IntraVENous, PRN, Vivienne Mckeon MD 
  acetaminophen (TYLENOL) tablet 650 mg, 650 mg, Oral, Q6H PRN, 650 mg at 11/13/20 0650 **OR** acetaminophen (TYLENOL) suppository 650 mg, 650 mg, Rectal, Q6H PRN, Vivienne Mckeon MD 
  polyethylene glycol (MIRALAX) packet 17 g, 17 g, Oral, DAILY PRN, Vivienne Mckeon MD 
  promethazine (PHENERGAN) tablet 12.5 mg, 12.5 mg, Oral, Q6H PRN **OR** ondansetron (ZOFRAN) injection 4 mg, 4 mg, IntraVENous, Q6H PRN, Vivienne Mckeon MD 
 
PATIENT CARE TEAM: 
Patient Care Team: 
Myna Schaumann, NP as PCP - General (Nurse Practitioner) Thank you for allowing me to participate in this patient's care. Mando Smith MD PhD 
[address], [address] Phone: [phone] Fax: [phone]

## 2020-11-14 NOTE — PROGRESS NOTES
[facility] Adult  Hospitalist Group Hospitalist Progress Note Terril Cranker, MD 
Answering service: [phone] OR [phone] from in house phone Date of Service:  2020 NAME:  Nancy Childs :  1950 MRN:  [id number] Admission Summary:  
78 y.o. female with past medical history significant for chronic systolic heart failure, ischemic cardiomyopathy with LVAD in place, hypertension, diabetes mellitus type 2 presented emergency room with increased fatigue and fall. Patient was recently admitted to the hospital and discharged on 2020 for similar hospital after being treated for cellulitis of the right lower extremity. Daughter states that since he was discharged patient has been more fatigue and having involuntary movement of her body. During that hospital stay she was started on 2 new medications including Levaquin and mexiletine. Daughter said that yesterday she fell at home injuring her left leg. She has an open wound and to the emergency room. She states that she had wound packed and sent home. Today daughter noticed that the wound started bleeding significantly for which she came to the emergency room. In the emergency room her work-up showed a hemoglobin of 6.9 down from8. As well she was found to have with worsening renal function. Admission was requested for ROCIO and anemia. In the ed wound was significantly bleeding. ED physician stopped bleeding with quick clot and its currently wrapped.  
  
 
Interval history / Subjective:  
Patient seen and examined  Remains alert and oriented X 3,involuntary movements less today while I was in the room. Assessment & Plan:  
 
New right leg wound, s/p fall Acute on chronic anemia: Likely secondary to bleed from leg wound -s/p 3 unit pRBCs 
-no other source of bleeding identified -s/p general surgery consult with bedside debridement 11/10 
-Xray negative for fracture On cefepime- can switch to keflex per ID Acute on chronic renal failure: Multifactorial likely secondary to hypovolemia due to bleed 
-hold diuretics 
-nephrology and Advanced heart failure following   
-IVF discontinued today Metabolic encephalopathy: improved  
-ammonia wnl, CT head - no acute abnormalities except 1 cm meningioma 
-she is alert and oriented today 
  
Hyperkalemia: resolved, due to ROCIO 
-s/p regular insulin plus glucose, calcium gluconate, amp of bicarb 
  
Chronic systolic heart failure NYHA class IV, ischemic cardiomyopathy status post LVAD Hold Bumex due to ROCIO. Continue with Coreg 12.5 mg twice daily. INR stable, will monitor Not on Entresto ACE inhibitor, ARB is due to CKD 
-Advanced heart failure following NSVT: recurrent 
-previously on mexiltine but patient reports dizziness 
-EP consulted. Placed on ranexa - QTC elevated today, will discuss with cardiology tomorrow prior to ranexa. Chronic LVAD infection:  
-ID consulted, recommended keflex suppressive therapy at discharge 
  
Diabetes mellitus type 2: 
Sliding scale insulin before meals and at bedtime. 
  
COPD: stable-Continue maintenance inhaled steroids. Bronchodilators as needed. 
  
Depression: stable.  Continue Paxil Morbid obesity: Body mass index is 46.34 kg/m².  OP management Involuntary jerky movements: 
-? Metabolic encephalopathy 
-will consult neurology for further eval. 
  
Code status: full DVT prophylaxis: theurapeutic INR Care Plan discussed with: Patient/Family Anticipated Disposition: Home w/Family Anticipated Discharge: Greater than 48 hours Hospital Problems  Date Reviewed: 10/28/2020 Codes Class Noted POA Coagulopathy ([facility] ICD-10-CM: P90.0 ICD-9-CM: 286.9  11/10/2020 Unknown Open wound of left lower leg ICD-10-CM: S64.762H ICD-9-CM: 891.0  11/10/2020 Unknown Anemia ICD-10-CM: D64.9 ICD-9-CM: 285.9  11/9/2020 Unknown ROCIO (acute kidney injury) ([facility] ICD-10-CM: N17.9 ICD-9-CM: 584.9  11/9/2020 Unknown NICM (nonischemic cardiomyopathy) ([facility] ICD-10-CM: I42.8 ICD-9-CM: 425.4  11/4/2020 Yes Chronic venous insufficiency ICD-10-CM: I87.2 ICD-9-CM: 459.81  11/4/2020 Yes Acute on chronic systolic CHF (congestive heart failure) (HCC) ICD-10-CM: W76.74 ICD-9-CM: 428.23, 428.0  10/27/2020 Yes Obesity, morbid ([facility] ICD-10-CM: E66.01 
ICD-9-CM: 278.01  10/1/2020 Yes Review of Systems:  
Negative unless stated above Vital Signs:  
 Last 24hrs VS reviewed since prior progress note. Most recent are: 
Visit Vitals Pulse 86 Temp 97.8 °F (36.6 °C) Resp 18 Wt 130.3 kg (287 lb 4.8 oz) SpO2 91% BMI 52.55 kg/m² Intake/Output Summary (Last 24 hours) at 11/13/2020 2137 Last data filed at 11/13/2020 2010 Gross per 24 hour Intake 1412.5 ml Output 1250 ml Net 162.5 ml Physical Examination:  
 
 
 
     
Constitutional:  no acute distress ENT:  Oral mucosa moist, oropharynx benign. Resp:  CTA bilaterally. CV:  LVAD hum, no murmurs, gallops, rubs GI:  Soft, non distended, obese, non tender. normoactive bowel sounds, no hepatosplenomegaly Musculoskeletal:  No edema, left leg wrapped, reviewed open wound pictures Neurologic:  alert and oriented X 3,moving all extremities, less jerky movements Skin: left leg skin tear Data Review:  
 Review and/or order of clinical lab test 
Review and/or order of tests in the radiology section of CPT Review and/or order of tests in the medicine section of CPT Labs:  
 
Recent Labs 11/13/20 
4353 11/12/20 
0544 WBC 6.7 6.5 HGB 7.8* 7.4* HCT 25.1* 24.3*  
* 123* Recent Labs 11/13/20 
8308 11/13/20 
9301 11/12/20 
0520 11/11/20 
1846 NA  --  139 140 138 K  --  4.6 4.6 4.6 CL  --  108 108 103 CO2  --  25 26 27 BUN  --  75* 96* 98* CREA  --  3.08* 3.88* 4.68* GLU  --  213* 113* 171* CA  --  8.4* 8.7 8.4* MG 2.9*  --  3.3*  --   
PHOS  --   --   --  5.2*  
URICA  --   --  12.4*  --   
 
Recent Labs 11/13/20 
4930 11/12/20 
0520 11/11/20 
6141 ALT 10* 10* 11* AP 74 69 67 TBILI 0.4 0.5 0.4 TP 6.7 6.8 6.7 ALB 2.8* 3.0* 2.8*  
GLOB 3.9 3.8 3.9 Recent Labs 11/13/20 
6806 11/12/20 
0544 11/11/20 
1973 INR 2.0* 2.3* 2.3* PTP 20.3* 22.7* 23.6* Recent Labs 11/12/20 
1446 11/12/20 
0544 TIBC 197*  --   
PSAT 13*  --   
FERR  --  431* Lab Results Component Value Date/Time Folate 10.0 10/28/2020 03:56 AM  
  
No results for input(s): PH, PCO2, PO2 in the last 72 hours. No results for input(s): CPK, CKNDX, TROIQ in the last 72 hours. No lab exists for component: CPKMB Lab Results Component Value Date/Time Cholesterol, total 192 10/01/2020 12:00 AM  
 HDL Cholesterol 29 (L) 10/01/2020 12:00 AM  
 LDL Chol Calc (NIH) 123 (H) 10/01/2020 12:00 AM  
 Triglyceride 222 (H) 10/01/2020 12:00 AM  
 
Lab Results Component Value Date/Time Glucose (POC) 191 (H) 11/13/2020 03:57 PM  
 Glucose (POC) 198 (H) 11/13/2020 12:10 PM  
 Glucose (POC) 196 (H) 11/13/2020 06:39 AM  
 Glucose (POC) 178 (H) 11/12/2020 09:58 PM  
 Glucose (POC) 130 (H) 11/12/2020 05:13 PM  
 
Lab Results Component Value Date/Time  Color Yellow/Straw 11/09/2020 12:05 AM  
 Appearance Turbid (A) 11/09/2020 12:05 AM  
 Specific gravity 1.013 11/09/2020 12:05 AM  
 pH (UA) 5.0 11/09/2020 12:05 AM  
 Protein 100 (A) 11/09/2020 12:05 AM  
 Glucose Negative 11/09/2020 12:05 AM  
 Ketone Negative 11/09/2020 12:05 AM  
 Bilirubin Negative 11/09/2020 12:05 AM  
 Urobilinogen 0.1 11/09/2020 12:05 AM  
 Nitrites Negative 11/09/2020 12:05 AM  
 Leukocyte Esterase Trace (A) 11/09/2020 12:05 AM  
 Bacteria Negative 11/09/2020 12:05 AM  
 WBC 0-5 11/09/2020 12:05 AM  
 RBC 0-5 11/09/2020 12:05 AM  
 
 
 
 Medications Reviewed:  
 
Current Facility-Administered Medications Medication Dose Route Frequency  [Held by provider] ranolazine ER (RANEXA) tablet 1,000 mg  1,000 mg Oral BID  iron sucrose (VENOFER) 200 mg in 0.9% sodium chloride 100 mL IVPB  200 mg IntraVENous Q24H  cefepime (MAXIPIME) 1 g in 0.9% sodium chloride (MBP/ADV) 50 mL MBP  1 g IntraVENous Q24H  Warfarin- MD/NP to Dose    Other Rx Dosing/Monitoring  ferrous sulfate tablet 325 mg  325 mg Oral DAILY WITH BREAKFAST  docusate sodium (COLACE) capsule 100 mg  100 mg Oral BID  
 gabapentin (NEURONTIN) capsule 300 mg  300 mg Oral DAILY  hydrOXYzine HCL (ATARAX) tablet 10 mg  10 mg Oral TID PRN  
 PARoxetine (PAXIL) tablet 30 mg  30 mg Oral DAILY  glucose chewable tablet 16 g  4 Tab Oral PRN  
 glucagon (GLUCAGEN) injection 1 mg  1 mg IntraMUSCular PRN  
 dextrose 10% infusion 0-250 mL  0-250 mL IntraVENous PRN  
 insulin lispro (HUMALOG) injection   SubCUTAneous AC&HS  sodium chloride (NS) flush 5-40 mL  5-40 mL IntraVENous Q8H  
 sodium chloride (NS) flush 5-40 mL  5-40 mL IntraVENous PRN  
 acetaminophen (TYLENOL) tablet 650 mg  650 mg Oral Q6H PRN Or  
 acetaminophen (TYLENOL) suppository 650 mg  650 mg Rectal Q6H PRN  polyethylene glycol (MIRALAX) packet 17 g  17 g Oral DAILY PRN  promethazine (PHENERGAN) tablet 12.5 mg  12.5 mg Oral Q6H PRN Or  
 ondansetron (ZOFRAN) injection 4 mg  4 mg IntraVENous Q6H PRN  
 
______________________________________________________________________ EXPECTED LENGTH OF STAY: 4d 0h 
ACTUAL LENGTH OF STAY:          4 William Ayoub MD

## 2020-11-14 NOTE — PROGRESS NOTES
Problem: Mobility Impaired (Adult and Pediatric) Goal: *Acute Goals and Plan of Care (Insert Text) Description: FUNCTIONAL STATUS PRIOR TO ADMISSION: Patient was modified independent using a rollator for functional mobility. HOME SUPPORT PRIOR TO ADMISSION: The patient lived alone with daughter available to provide assistance. Physical Therapy Goals Initiated 11/13/2020 1. Patient will move from supine to sit and sit to supine , scoot up and down, and roll side to side in bed with modified independence within 7 day(s). 2.  Patient will transfer from bed to chair and chair to bed with modified independence using the least restrictive device within 7 day(s). 3.  Patient will perform sit to stand with modified independence within 7 day(s). 4.  Patient will ambulate with modified independence for 150 feet with the least restrictive device within 7 day(s). Outcome: Progressing Towards Goal 
 PHYSICAL THERAPY TREATMENT Patient: Baljeet Montejo (10 y.o. female) Date: 11/14/2020 Diagnosis: Anemia [D64.9] ROCIO (acute kidney injury) ([facility] [N17.9] <principal problem not specified> Precautions: Fall Chart, physical therapy assessment, plan of care and goals were reviewed. ASSESSMENT Patient continues with skilled PT services and is progressing towards goals. She is received supine in bed with LVAD on wall [employer]. Patient reports independence with LVAD switch over but per documentation she has been having difficulty. RN assists in LVAD change over and patient requires assistance unscrewing lines as well as VC for which line to disconnect first. She demonstrates significant UE tremors increasing difficulty of task. On first attempt at standing patient demonstrates bilateral LE buckling and jumping requiring her to sit back down at EOB. After short seated rest break patient is able to transfer from bed to bedside chair with use of RW instead of patients rollator. Current Level of Function Impacting Discharge (mobility/balance): Min A due to tremors, use of RW Other factors to consider for discharge: medical stability, increased need for assistance, decreased balance, PLOF, increased risk for falls PLAN : 
Patient continues to benefit from skilled intervention to address the above impairments. Continue treatment per established plan of care. to address goals. Recommendation for discharge: (in order for the patient to meet his/her long term goals) Therapy 3 hours per day 5-7 days per week This discharge recommendation: 
Has been made in collaboration with the attending provider and/or case management IF patient discharges home will need the following DME: to be determined (TBD) SUBJECTIVE:  
Patient stated it makes it kind of difficult if you're home alone.   - in response to tremors OBJECTIVE DATA SUMMARY:  
Critical Behavior: 
Neurologic State: Alert Orientation Level: Oriented X4 Cognition: Appropriate for age attention/concentration Safety/Judgement: Decreased insight into deficits, Decreased awareness of need for safety, Decreased awareness of need for assistance Functional Mobility Training: 
Bed Mobility: 
  
Supine to Sit: Contact guard assistance Scooting: Stand-by assistance Transfers: 
Sit to Stand: Minimum assistance Stand to Sit: Minimum assistance Balance: 
Sitting: Impaired; Without support Sitting - Static: Good (unsupported) Sitting - Dynamic: Fair (occasional) Standing: Impaired; With support Standing - Static: Fair;Constant support Standing - Dynamic : Fair;Constant support Ambulation/Gait Training: 
  
  
  
  
  
  
  
  
  
  
  
  
  
  
  
  
  
  
Stairs: 
  
  
   
Skin: Patient demonstrates R UE itching and small open wounds from scratching. Education is provided not scratching in order to reduce the risk of infection. Therapeutic Exercises:  
 
Pain Rating: Activity Tolerance:  
Fair After treatment patient left in no apparent distress:  
Sitting in chair, Heels elevated for pressure relief, and Call bell within reach COMMUNICATION/COLLABORATION:  
The patients plan of care was discussed with: Registered nurse. Freddie Mclaughlin, PT Time Calculation: 36 mins

## 2020-11-14 NOTE — PROGRESS NOTES
Cardiac   Hospital Progress Note REFERRING PROVIDER: Dr Misty Cool Subjective:  
  
Bienvenido Booker is a 78 y.o. patient who is seen for evaluation/management of VT & mexiletine intolerance. She was readmitted on 11/09/2020 after a fall, suffered leg wound bleeding. She had been discharged on Levaquin, still being treated for leg cellulitis (Morganella morganii), has renal failure, advanced systolic CHF, & anemia She had started mexiletine on 11/02/2020 during a recent admission for control of recurrent VT. Her daughter then reported that she was unsteady, fatigued, nauseated, & shaky with Levaquin & mexiletine. Antibiotic changed to cefepime IV. Stopped mexiletine this admission, started ranolazine 500 mg po bid on 11/11/2020. 
 
+ non sustained 8-15 beats of very wide complex VT at rate 150 bpm noted 8 20 pm on telemetry. Atrial & ventricular pacing.   
  
RV & LV failure, is s/p LVAD in 2017 at [facility].  NYHA III chronic systolic CHF. On appropriate GDMT. 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 
  
[employer] dual chamber AICD had been checked earlier this month, showed proper function. Pacing rate 70 bpm to suppress PVCs. Anticoagulated with warfarin, but recommendation of general surgery to hold to allow for leg wound healing. [facility] has lowered target INR to 2-2.5 (2.3 today). 
 
   
Previous: 
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 Echo (11/02/2020): LVEF 15-20%, LVAD in place. Mildly dilated RV with mildly reduced RVEF. Mildly dilated LA. Aortic valve sclerosis without stenosis. 
  
Treated for abscess of sternal wound since late 2019. 
  
[facility] (11/09/2018): 2 vessel disease with 90% OM, 80% PDA, DSA to PDA branch 
  
S/p PCI approx 2017. Reports history of Plavix resistance. 
  
[facility] (08/2016): High grade ramus & small PDA disease, borderline disease of LAD & takeoff of pRCA.  
  
 Previously followed by [facility], now sees Dr. Leon Lieberman. 
  
Mother with CAD,  age 76. Father  age [de-identified], CAD & MI.  Brother with valve replacement. 
  
 
Patient Active Problem List  
Diagnosis Code  Obesity, morbid ([facility] E66.01  
 Acute on chronic systolic CHF (congestive heart failure) ([facility]) I50.23  
 NICM (nonischemic cardiomyopathy) ([facility] I42.8  Coronary artery disease involving native coronary artery of native heart without angina pectoris I25.10  
 JED on CPAP G47.33, Z99.89  Chronic venous insufficiency I87.2  Ulcer of right foot, limited to breakdown of skin ([facility]) L97.521  
 NSVT (nonsustained ventricular tachycardia) ([facility]) I47.2  Anemia D64.9  ROCIO (acute kidney injury) ([facility] N17.9  Coagulopathy ([facility] D68.9  
 Open wound of left lower leg S81.802A Current Facility-Administered Medications Medication Dose Route Frequency Provider Last Rate Last Dose  ranolazine ER (RANEXA) tablet 1,000 mg  1,000 mg Oral BID Schuyler Cerda MD   1,000 mg at 20 1139  
 iron sucrose (VENOFER) 200 mg in 0.9% sodium chloride 100 mL IVPB  200 mg IntraVENous Q24H Rosmery Walter  mL/hr at 20 1824 200 mg at 20 1824  cefepime (MAXIPIME) 1 g in 0.9% sodium chloride (MBP/ADV) 50 mL MBP  1 g IntraVENous Q24H Sita Muñoz  mL/hr at 20 1138 1 g at 20 1138  Warfarin- MD/NP to Dose    Other Rx Dosing/Monitoring Rosmery Walter NP      
 ferrous sulfate tablet 325 mg  325 mg Oral DAILY WITH BREAKFAST Bob Ventura DO   325 mg at 20 0414  docusate sodium (COLACE) capsule 100 mg  100 mg Oral BID Jesica KATE, DO   100 mg at 20 8202  gabapentin (NEURONTIN) capsule 300 mg  300 mg Oral DAILY Jesica KTAE, DO   300 mg at 20 8334  hydrOXYzine HCL (ATARAX) tablet 10 mg  10 mg Oral TID PRN Jesica KATE DO      
 PARoxetine (PAXIL) tablet 30 mg  30 mg Oral DAILY Bob Gomez, DO 30 mg at 11/14/20 3225  
 glucose chewable tablet 16 g  4 Tab Oral PRN Forestine Buddle M, DO      
 glucagon [facility]) injection 1 mg  1 mg IntraMUSCular PRN Forestine Buddle M, DO      
 dextrose 10% infusion 0-250 mL  0-250 mL IntraVENous PRN Forestine Buddle M, DO      
 insulin lispro (HUMALOG) injection   SubCUTAneous AC&HS Porsche Knighta, DO   2 Units at 11/14/20 5252  sodium chloride (NS) flush 5-40 mL  5-40 mL IntraVENous Q8H Alpesh Toledo MD   10 mL at 11/14/20 5737  sodium chloride (NS) flush 5-40 mL  5-40 mL IntraVENous PRN Alpesh Toledo MD      
 acetaminophen (TYLENOL) tablet 650 mg  650 mg Oral Q6H PRN Alpesh Toledo MD   650 mg at 11/13/20 0262 Or  acetaminophen (TYLENOL) suppository 650 mg  650 mg Rectal Q6H PRN Alpesh Toledo MD      
 polyethylene glycol [facility]) packet 17 g  17 g Oral DAILY PRN Alpesh Toledo MD      
 promethazine (PHENERGAN) tablet 12.5 mg  12.5 mg Oral Q6H PRN Alpesh Toledo MD      
 Or  
 ondansetron [facility]) injection 4 mg  4 mg IntraVENous Q6H PRN Alpesh Toledo MD      
 
No Known Allergies Past Medical History:  
Diagnosis Date  Asthma  Cancer ([facility] breast  
 Cancer ([facility]   
 endometrial  
 Congestive heart failure, unspecified  CRI (chronic renal insufficiency)  Depression  Diabetes ([facility]  Hypertension Past Surgical History:  
Procedure Laterality Date  HX HERNIA REPAIR    
 HX HYSTERECTOMY  HX MASTECTOMY See HPI for family history. Social History Tobacco Use  Smoking status: Never Smoker  Smokeless tobacco: Never Used Substance Use Topics  Alcohol use: Not Currently Review of Systems: All other review of systems otherwise negative. Constitutional: Negative for fever, chills, weight loss, + malaise/fatigue. HEENT: Negative for nosebleeds, vision changes. Respiratory: Negative for cough, hemoptysis Cardiovascular: Negative for chest pain, palpitations, orthopnea, claudication, + leg swelling, no syncope, and PND. Gastrointestinal: Negative for nausea, vomiting, diarrhea, blood in stool and melena. Genitourinary: Negative for dysuria, and hematuria. Musculoskeletal: Negative for myalgias, + arthralgia. + involuntary muscle jerking but has been less often Skin: Negative for rash. + left leg wound. Heme: + bleed or bruise easily. Neurological: Negative for speech change and + general weakness Objective: LVAD Visit Vitals Pulse 78 Temp 98.1 °F (36.7 °C) Resp 18 Wt 288 lb 9.6 oz (130.9 kg) SpO2 93% BMI 52.79 kg/m² Physical Exam:  
Constitutional: Well-developed and well-nourished. No respiratory distress. Head: Normocephalic and atraumatic. Eyes: Pupils are equal, round ENT: Hearing grossly normal 
Neck: Supple. No JVD present. Cardiovascular: LVAD sounds. Pulmonary/Chest: Effort normal and breath sounds normal. No wheezes. Abdominal: Morbidly obese Musculoskeletal: Moves extremities independently. Neurological: Alert,oriented. Skin: Skin is warm and dry. Dressings to leg wounds are clean Psychiatric: Normal mood and affect. Behavior is normal. Judgment and thought content normal.   
 
CMP:  
Lab Results Component Value Date/Time  11/14/2020 04:33 AM  
 K 5.1 11/14/2020 04:33 AM  
  (H) 11/14/2020 04:33 AM  
 CO2 25 11/14/2020 04:33 AM  
 AGAP 7 11/14/2020 04:33 AM  
  (H) 11/14/2020 04:33 AM  
 BUN 69 (H) 11/14/2020 04:33 AM  
 CREA 2.69 (H) 11/14/2020 04:33 AM  
 GFRAA 21 (L) 11/14/2020 04:33 AM  
 GFRNA 18 (L) 11/14/2020 04:33 AM  
 CA 8.6 11/14/2020 04:33 AM  
 MG 2.7 (H) 11/14/2020 04:33 AM  
 ALB 2.9 (L) 11/14/2020 04:33 AM  
 TP 7.2 11/14/2020 04:33 AM  
 GLOB 4.3 (H) 11/14/2020 04:33 AM  
 AGRAT 0.7 (L) 11/14/2020 04:33 AM  
 ALT 12 11/14/2020 04:33 AM  
 
CBC:  
Lab Results Component Value Date/Time  WBC 5.5 11/14/2020 04:33 AM  
 HGB 8.2 (L) 11/14/2020 04:33 AM  
 HCT 26.4 (L) 11/14/2020 04:33 AM  
  11/14/2020 04:33 AM  
  
COAGS:  
Lab Results Component Value Date/Time PTP 20.0 (H) 11/14/2020 04:33 AM  
 INR 2.0 (H) 11/14/2020 04:33 AM  
 
 
Assessment/Plan: Ms. Ragini Lloyd has RV & LV failure, is s/p LVAD in 2017 at [facility].   
No ACEi/ARB due to CKD, but otherwise on appropriate GDMT. Nuclear PYP scan indicated amyloid ATTR type-await medical therapy if can get assistance with drug cost per Dr Annmarie Gonzales Previously noted nausea with mexiletine for recurrent VT, now with questionable neurologic side effect (involuntary muscle jerking). Mexiletine has been discontinued. Not a good candidate for amiodarone given RV failure. Ablation is not possible given ongoing infection, LVAD, morbid obesity, severe anemia, & JED. Non sustained and asymptomatic recurrent VT noted day 1 after switching mexiletine to ranolazine 500 mg po bid (off label use). will increase to 1000 mg po bid but given her chronic renal failure and creatinine 3, 12 lead ECG will be ordered today for QT measurement She had ECG 11/9 with AV pacing and QT interval 420 ms at 70 bpm pacing If QTc is longer than 550 ms with ventricular pacing, ranexa dose has to be reduced or even stopped->550 on telemetry measurement, EKG pending. Coreg has been stopped due to low pressure and RV failure NSVT rate was slow 150 bpm and QRS complexes are wide indicating epicardial type, potassium was normal 
She has LVAD so epicardial ablation will not be feasible even if she is stable enough for the procedure Right Atrial & right ventricular pacing, she has [employer] dual chamber ICD; recent check showed proper function. 2 years of battery left. Upgrading to [facility] ICD would not be likely helping her as she has LVAD in place and amyloid cardiomyopathy Thank you for involving me in this patient's care and please call with further concerns or questions.  
 
Barbi Heath MD 
 [phone]

## 2020-11-14 NOTE — PROGRESS NOTES
Pharmacist Note  Warfarin Dosing Consult provided for this 78 y. o.female to manage warfarin for Atrial Fibrillation INR Goal: [de-identified] Home regimen/ tablet size: 4 mg daily Drugs that may increase INR: None Drugs that may decrease INR: None Other current anticoagulants/ drugs that may increase bleeding risk: None Risk factors: Age > 72 Daily INR ordered: YES Recent Labs 11/14/20 
7040 11/13/20 
1507 11/13/20 
0511 11/12/20 
0544 HGB 8.2* 7.8*  --  7.4* INR 2.0*  --  2.0* 2.3* Date               INR                  Dose 11/14       2   4 mg Assessment/ Plan: Will order warfarin 4 mg PO x 1 dose. Pharmacy will continue to monitor daily and adjust therapy as indicated. Please contact the pharmacist at x [phone] or  for outpatient recommendations if needed.

## 2020-11-15 NOTE — PROGRESS NOTES
Day #1 of 1 Indication:  surgical prophylaxis Current regimen:  500 mg bid Abx regimen: Cephalexin Recent Labs 20 
5614 20 
0657 20 
6856 20 
0544 20 
5114 WBC 5.5 6.7  --  6.5  --   
CREA 2.69*  --  3.08*  --  3.88* BUN 69*  --  75*  --  96* Est CrCl: 26 ml/min Temp (24hrs), Av.1 °F (36.7 °C), Min:97.5 °F (36.4 °C), Max:98.6 °F (37 °C) Cultures: no new Plan: Change to 250 mg bid

## 2020-11-15 NOTE — PROGRESS NOTES
Pharmacist Note  Warfarin Dosing Consult provided for this 78 y. o.female to manage warfarin for Atrial Fibrillation; LVAD INR Goal: 1.8-2.5 Home regimen/ tablet size: 4 mg daily Drugs that may increase INR: None Drugs that may decrease INR: None Other current anticoagulants/ drugs that may increase bleeding risk: None Risk factors: Age > 72 Daily INR ordered: YES Recent Labs 11/15/20 
0335 11/14/20 
0078 11/13/20 
0341 11/13/20 
2867 HGB 8.3* 8.2* 7.8*  --   
INR 2.1* 2.0*  --  2.0* Date               INR                  Dose 11/14       2   4 mg  
11/15              2.1                   4 mg Assessment/ Plan: Will order warfarin 4 mg PO x 1 dose. Pharmacy will continue to monitor daily and adjust therapy as indicated. Please contact the pharmacist at x [phone] or  for outpatient recommendations if needed.

## 2020-11-15 NOTE — PROGRESS NOTES
[address] [facility] [address] [facility] in [address], [place] Inpatient Progress Note Patient name: Tianna Hdz Patient : 1950 Patient MRN: [id number] Attending MD: Nathaniel Cantrell MD 
Date of service: 11/15/20 CHIEF COMPLAINT: 
Cellulitis 
  
PLAN: 
Continue current device speed 9400rpm; intolerant to higher speed due to VT Expect increasing intolerance to BB/ACEi/ARB/ARNI due to aTTR; on hold for now Application for patient assistance for tafamidis as OP Intolerant of spironolactone due to hyperkalemia  
Diuretic management per nephrology; awaiting their input; gave one dose of bumex 1mg today due to proNTBNP rising > 17K Coumadin per pharmacy; goal INR lowered to 1.8-2.5 EP cardiology consult appreciate; no ectopy on ranolazine Transfuse to keep HGB>7 Antibiotics per ID Use home CPAP 
DTC consult appreciated PT/OT Nutrition consult Neurology consult appreciated; held gabapentin with ROCIO 
GI, urogynecology, and pulmonary consults as OP Palliative care consult early this week to discuss code status and goals of care, d/w patient and daughter 
  
IMPRESSION: 
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 
C/b cardiogenic shock s/p Impella bridge to LVAD S/p HeartMate 2 LVAD implantation (17 by Dr. Amirah Brand) · C/b delayed extubation due to severe COPD 
· C/b critical illness polyneuropathy · C/b prolonged hospitalization post-LVAD, 55 days · C/b sternal wound infection s/p debridement (by Dr. Errol Yeh) s/p wound vac · C/b sacral ulcer · Would culture positive for Staph aureus, not MRSA · C/b LVAD site drainage, improved S/p CRT-ICD · ICD fired due to electrolyte imbalance () H/o breast cancer () · s/p bilateral mastectomy/chemo and endometrial cancer s/p hysterectomy · Lymphedema of LLE due to cancer treatment Severe COPD with FEV1 50% Depression Atrial fibrillation H/o \"two mini strokes\" S/p fall with hip facture · Right hip hemmiarthroplasty (18) by Dr. Raquel Cai) · S/p removed hip hardwarare due to pain (4/15/19) COPD severe CKD, stage 3 Hyperkalemia Pulmonary hypertension Cardiac risk factors: · Morbid obesity, Body mass index is 53.63 kg/m². · DM2 insulin dependent · JED on CPAP 
· HL Urinary incontinence, severe · no procedures due to anticoagulation · conservative management with Detrol 4 pills bid Endometrial cancer () HTN 
HL 
aTTR amyloidosis  
Left leg wound following fall Encephalopathy Renal failure  
  
CARDIAC IMAGING: 
Echo (19) LVEF 20-25%, AV opens 1:1, no AR Echo (18) LVEF 10-%, ramps study done, report in [facility] Echo (18) ramp study done, LVEDD 7.1cm LHC (18) 2 vessel disease with 90% OM, 80% PDA, DSA to PDA branch 
TTE (10/27/20) LVEF 15-20% LVIDd 7.26cm RVIDd 2.82cm Tapse 1.14cm 
  
HEMODYNAMICS: 
[facility] 11/3 shows CVP 14 mmHg, PA 36/18 mmHg, PCWP 15 mmHg, Sal CI 2.4 
CPEST not done 6MW not done 
  
OTHER IMAGING: 
EGD/C-scope (19) no active bleeding and polyp removed. 
  
INTERVAL HISTORY: 
Doing well Feels great Labs improving 
  
FAMILY HISTORY: 
Mother  76 years, diagnosed with DM, HTN, CAD Father  [de-identified]years old, HTN, CAD, MI 
Positive for DM and heart disease in the family, brother with valve replacement 
  
SOCIAL HISTORY: 
Never alcohol, never smoked, , lives alone, no illicit drug use, lives in house, ambulatory status indpendedn, children: daughter, occupation retired Lives alone.  
 
INTERVAL HISTORY: 
 \"Not feeling well in her head\" Admits she did not use CPAP Hemodynamics stable No ectopy on ranolazine LVAD INTERROGATION: 
Device interrogated in person Device function normal, normal flow, no events LVAD Pump Speed (RPM): 3771 Pump Flow (LPM): 5.1 MAP: 70 
PI (Pulsitility Index): 6.8 Power: 5.7  Test: No 
Back Up  at Bedside & Labeled: Yes Power Module Test: No 
Driveline Site Care: No 
Driveline Dressing: Clean, Dry, and Intact Outpatient: No 
MAP in Therapeutic Range (Outpatient): Yes Testing Alarms Reviewed: Yes 
Back up SC speed: 9400 Back up Low Speed Limit: 9200 Emergency Equipment with Patient?: Yes Emergency procedures reviewed?: Yes Drive line site inspected?: No 
Drive line intergrity inspected?: Yes Drive line dressing changed?: No 
 
PHYSICAL EXAM: 
Visit Vitals Pulse 92 Temp 98.6 °F (37 °C) Resp 18 Wt 287 lb 0.6 oz (130.2 kg) SpO2 97% BMI 52.50 kg/m² General: Patient is well developed, well-nourished in no acute distress HEENT: Normocephalic and atraumatic. No scleral icterus. Pupils are equal, round and reactive to light and accomodation. No conjunctival injection. Oropharynx is clear. Neck: Supple. No evidence of thyroid enlargements or lymphadenopathy. JVD: Cannot be appreciated Lungs: Breath sounds are equal and clear bilaterally. No wheezes, rhonchi, or rales. Heart: Regular rate and rhythm with normal S1 and S2. No murmurs, gallops or rubs. Abdomen: Soft, no mass or tenderness. No organomegaly or hernia. Bowel sounds present. Genitourinary and rectal: deferred Extremities: No cyanosis, clubbing, or edema. Neurologic: No focal sensory or motor deficits are noted. Grossly intact. Psychiatric: Awake, alert an doriented x 3. Appropriate mood and affect. Skin: Warm, dry and well perfused. No lesions, nodules or rashes are noted. REVIEW OF SYSTEMS: 
General: Denies fever, night sweats. Ear, nose and throat: Denies difficulty hearing, sinus problems, runny nose, post-nasal drip, ringing in ears, mouth sores, loose teeth, ear pain, nosebleeds, sore throate, facial pain or numbess Cardiovascular: see above in the interval history Respiratory: Denies cough, wheezing, sputum production, hemoptysis. Gastrointestinal: Denies heartburn, constipation, intolerance to certain foods, diarrhea, abdominal pain, nausea, vomiting, difficulty swallowing, blood in stool Kidney and bladder: Denies painful urination, frequent urination, urgency, prostate problems and impotence Musculoskeletal: Denies joint pain, muscle weakness Skin and hair: Denies change in existing skin lesions, hair loss or increase, breast changes PAST MEDICAL HISTORY: 
Past Medical History:  
Diagnosis Date  Asthma  Cancer ([facility] [address] [facility] breast  
 [facility]   
 endometrial  
 Congestive heart failure, unspecified  CRI (chronic renal insufficiency)  Depression  [facility]  Hypertension PAST SURGICAL HISTORY: 
Past Surgical History:  
Procedure Laterality Date  HX HERNIA REPAIR    
 HX HYSTERECTOMY  HX MASTECTOMY FAMILY HISTORY: 
History reviewed. No pertinent family history. SOCIAL HISTORY: 
Social History Socioeconomic History  Marital status:  Spouse name: Not on file  Number of children: Not on file  Years of education: Not on file  Highest education level: Not on file Tobacco Use  Smoking status: Never Smoker  Smokeless tobacco: Never Used Substance and Sexual Activity  Alcohol use: Not Currently LABORATORY RESULTS: 
  
Labs Latest Ref Rng & Units 11/15/2020 11/14/2020 11/13/2020 11/12/2020 11/11/2020 11/11/2020 11/11/2020 WBC 3.6 - 11.0 K/uL 5.4 5.5 6.7 6.5 - 9.6 -  
RBC 3.80 - 5.20 M/uL 3.01(L) 2.87(L) 2.80(L) 2.68(L) - 2.68(L) - Hemoglobin 11.5 - 16.0 g/dL 8. 3(L) 8.2(L) 7. 8(L) 7. 4(L) 7. 0(L) 7. 4(L) -  
 Hematocrit 35.0 - 47.0 % 27. 4(L) 26. 4(L) 25. 1(L) 24. 3(L) 22. 6(L) 23. 9(L) -  
MCV 80.0 - 99.0 FL 91.0 92.0 89.6 90.7 - 89.2 - Platelets 973 - 219 K/uL 172 164 136(L) 123(L) - 140(L) - Lymphocytes 12 - 49 % - - - - - - - Monocytes 5 - 13 % - - - - - - - Eosinophils 0 - 7 % - - - - - - - Basophils 0 - 1 % - - - - - - - Albumin 3.5 - 5.0 g/dL 2. 9(L) 2. 9(L) 2. 8(L) 3.0(L) - - 2. 8(L) Calcium 8.5 - 10.1 MG/DL 9.1 8.6 8.4(L) 8.7 - - 8. 4(L) Glucose 65 - 100 mg/dL 127(H) 130(H) 213(H) 113(H) - - 171(H) BUN 6 - 20 MG/DL 59(H) 69(H) 75(H) 96(H) - - 98(H) Creatinine 0.55 - 1.02 MG/DL 2.50(H) 2.69(H) 3.08(H) 3.88(H) - - 4.68(H) Sodium 136 - 145 mmol/L 141 142 139 140 - - 138 Potassium 3.5 - 5.1 mmol/L 4.9 5.1 4.6 4.6 - - 4.6 TSH 0.450 - 4.500 uIU/mL - - - - - - -  
LDH 81 - 246 U/L 217 240 219 266(H) - - - Some recent data might be hidden Lab Results Component Value Date/Time TSH 2.980 10/01/2020 12:00 AM  
 
 
ALLERGY: 
No Known Allergies CURRENT MEDICATIONS: 
 
Current Facility-Administered Medications:  
  warfarin (COUMADIN) tablet 4 mg, 4 mg, Oral, ONCE, Nathalie Gr MD 
  Warfarin - Pharmacy Dosing, , Other, Rx Dosing/Monitoring, Nathalie Gr MD 
  PARoxetine (PAXIL) tablet 20 mg, 20 mg, Oral, DAILY, Albert Akhtar, DO, 20 mg at 11/15/20 0809 
  albuterol-ipratropium (DUO-NEB) 2.5 MG-0.5 MG/3 ML, 3 mL, Nebulization, Q6H PRN, Lorenzo Bailey MD, 3 mL at 11/14/20 1549   cephALEXin (KEFLEX) capsule 250 mg, 250 mg, Oral, BID, Oneal Quinn MD, 250 mg at 11/15/20 0809 
  ranolazine ER (RANEXA) tablet 1,000 mg, 1,000 mg, Oral, BID, Mariaa Greer MD, 1,000 mg at 11/15/20 3233   ferrous sulfate tablet 325 mg, 325 mg, Oral, DAILY WITH BREAKFAST, Jayme Dago M, DO, 325 mg at 11/15/20 0809 
  docusate sodium (COLACE) capsule 100 mg, 100 mg, Oral, BID, Jayme Dago M, DO, 100 mg at 11/15/20 8862   hydrOXYzine HCL (ATARAX) tablet 10 mg, 10 mg, Oral, TID PRN, Anabel Sin, [employer] M, DO 
  glucose chewable tablet 16 g, 4 Tab, Oral, PRN, Cowden Sin, [employer] M, DO 
  glucagon (GLUCAGEN) injection 1 mg, 1 mg, IntraMUSCular, PRN, Adin Nearing M, DO 
  dextrose 10% infusion 0-250 mL, 0-250 mL, IntraVENous, PRN, Cowden Sin, [employer] M, DO 
  insulin lispro (HUMALOG) injection, , SubCUTAneous, AC&HS, Adin Nearing M, DO, Stopped at 11/14/20 1630 
  sodium chloride (NS) flush 5-40 mL, 5-40 mL, IntraVENous, Q8H, Dori Chapman MD, 10 mL at 11/15/20 7984   sodium chloride (NS) flush 5-40 mL, 5-40 mL, IntraVENous, PRN, Dori Chapman MD 
  acetaminophen (TYLENOL) tablet 650 mg, 650 mg, Oral, Q6H PRN, 650 mg at 11/13/20 0650 **OR** acetaminophen (TYLENOL) suppository 650 mg, 650 mg, Rectal, Q6H PRN, Dori Chapman MD 
  polyethylene glycol (MIRALAX) packet 17 g, 17 g, Oral, DAILY PRN, Dori Chapman MD 
  promethazine (PHENERGAN) tablet 12.5 mg, 12.5 mg, Oral, Q6H PRN **OR** ondansetron (ZOFRAN) injection 4 mg, 4 mg, IntraVENous, Q6H PRN, Dori Chapman MD 
 
PATIENT CARE TEAM: 
Patient Care Team: 
Marika Mcmillan NP as PCP - General (Nurse Practitioner) Thank you for allowing me to participate in this patient's care. Charmaine Claude, MD PhD 
Beatriz Villasenor [facility] Formerly [facility] [address] 
[address] [place] [address], [address] Phone: [phone] Fax: [phone]

## 2020-11-15 NOTE — PROGRESS NOTES
0730: Bedside shift change report given to Angus Adams rn (oncoming nurse) by Huma Chavez (offgoing nurse). Report included the following information SBAR, Kardex, Intake/Output, MAR, and Recent Results. 1734: pt reporting interest in adaptive utensils to help with her eating. OT may be able to provide these as the patient is unable to obtain hers from home 
 
1930: Bedside and Verbal shift change report given to Azul (oncoming nurse) by Naomy Burgos (offgoing nurse). Report included the following information SBAR, Kardex, Intake/Output, MAR and Recent Results. Problem: Diabetes Self-Management Goal: *Disease process and treatment process Description: Define diabetes and identify own type of diabetes; list 3 options for treating diabetes. Outcome: Progressing Towards Goal 
Goal: *Incorporating nutritional management into lifestyle Description: Describe effect of type, amount and timing of food on blood glucose; list 3 methods for planning meals. Outcome: Progressing Towards Goal 
Goal: *Incorporating physical activity into lifestyle Description: State effect of exercise on blood glucose levels. Outcome: Progressing Towards Goal 
Goal: *Developing strategies to promote health/change behavior Description: Define the ABC's of diabetes; identify appropriate screenings, schedule and personal plan for screenings. Outcome: Progressing Towards Goal 
Goal: *Using medications safely Description: State effect of diabetes medications on diabetes; name diabetes medication taking, action and side effects. Outcome: Progressing Towards Goal 
Goal: *Monitoring blood glucose, interpreting and using results Description: Identify recommended blood glucose targets  and personal targets. Outcome: Progressing Towards Goal 
Goal: *Prevention, detection, treatment of acute complications Description: List symptoms of hyper- and hypoglycemia; describe how to treat low blood sugar and actions for lowering  high blood glucose level. Outcome: Progressing Towards Goal 
  
Problem: Patient Education: Go to Patient Education Activity Goal: Patient/Family Education Outcome: Progressing Towards Goal 
  
Problem: Falls - Risk of 
Goal: *Absence of Falls Description: Document Yue Heads Fall Risk and appropriate interventions in the flowsheet. Outcome: Progressing Towards Goal 
Note: Fall Risk Interventions: 
Mobility Interventions: Communicate number of staff needed for ambulation/transfer, OT consult for ADLs, Patient to call before getting OOB, PT Consult for mobility concerns Medication Interventions: Teach patient to arise slowly, Patient to call before getting OOB, Evaluate medications/consider consulting pharmacy Elimination Interventions: Call light in reach, Patient to call for help with toileting needs, Toileting schedule/hourly rounds History of Falls Interventions: Consult care management for discharge planning, Bed/chair exit alarm, Door open when patient unattended, Investigate reason for fall Problem: Patient Education: Go to Patient Education Activity Goal: Patient/Family Education Outcome: Progressing Towards Goal 
  
Problem: Pressure Injury - Risk of 
Goal: *Prevention of pressure injury Description: Document Jaime Scale and appropriate interventions in the flowsheet. Outcome: Progressing Towards Goal 
Note: Pressure Injury Interventions: 
Sensory Interventions: Check visual cues for pain, Float heels, Keep linens dry and wrinkle-free, Minimize linen layers Moisture Interventions: Check for incontinence Q2 hours and as needed, Internal/External urinary devices, Minimize layers Activity Interventions: Increase time out of bed, Pressure redistribution bed/mattress(bed type) Mobility Interventions: Pressure redistribution bed/mattress (bed type) Nutrition Interventions: Document food/fluid/supplement intake, Offer support with meals,snacks and hydration Friction and Shear Interventions: Apply protective barrier, creams and emollients, HOB 30 degrees or less, Lift sheet Problem: Patient Education: Go to Patient Education Activity Goal: Patient/Family Education Outcome: Progressing Towards Goal 
  
Problem: Patient Education: Go to Patient Education Activity Goal: Patient/Family Education Outcome: Progressing Towards Goal 
  
Problem: Heart Failure: Day 5 Goal: Activity/Safety Outcome: Progressing Towards Goal 
Goal: Diagnostic Test/Procedures Outcome: Progressing Towards Goal 
Goal: Nutrition/Diet Outcome: Progressing Towards Goal 
Goal: Discharge Planning Outcome: Progressing Towards Goal 
Goal: Medications Outcome: Progressing Towards Goal 
Goal: Respiratory Outcome: Progressing Towards Goal 
Goal: Treatments/Interventions/Procedures Outcome: Progressing Towards Goal 
Goal: Psychosocial 
Outcome: Progressing Towards Goal 
  
Problem: Heart Failure: Discharge Outcomes Goal: *Demonstrates ability to perform prescribed activity without shortness of breath or discomfort Outcome: Progressing Towards Goal 
Goal: *ACEI prescribed if LVEF less than 40% and no contraindications or ARB prescribed Outcome: Progressing Towards Goal 
Goal: *Describes available resources and support systems Description: (eg: Home Health, Palliative Care, Advanced Medical Directive) Outcome: Progressing Towards Goal 
Goal: *Describes/verbalizes understanding of follow-up/return appt Description: (eg: to physicians, diabetes treatment coordinator, and other resources Outcome: Progressing Towards Goal 
Goal: *Describes importance of continuing daily weights and changes to report to physician Outcome: Progressing Towards Goal

## 2020-11-15 NOTE — PROGRESS NOTES
[facility] 
 [address] [facility], [facility] [address] [facility] Phone: [phone]  
  
Nephrology Progress Note 
Sahil Don     1950     [id number] Date of Admission : 11/9/2020 
11/15/20 CC: Follow up for ARF Assessment and Plan ROCIO on CKD 
- Possible ATN from IV volume depletion, blood loss. - No obstruction on imaging, urine eos neg 
- UOP and Cr improving  
- SOB better and received IV bumex 1 mg in am and  1 mg po in afternnon  
-may needmore diuretic in am tomorrow  
- no urgent need for RRT at this time 
  
CKD stage III. -Baseline creatinine prior to last admission was 1.3 mg/dL 
-Creatinine close to 2.0-2.1 when she was euvolemic. 
-Etiology of CKD is likely diabetes hypertension and cardiomyopathy. However she does have a positive PYP testing which raises the possibility of amyloidosis. 
  
Chronic systolic CHF, stage C NYHA class IV 
-Combined ischemic and nonischemic cardiomyopathy 
-S/p HM 2 LVAD implant 4/15/2017 by Dr. Ludwin Bedolla at [facility] 
- hx of recurrent VT : off mexiletine 
-Recent acute on chronic RV failure. -Recent sternal wound infection with staph aureus and Morganella. Has LVAD site drainage. 
  
Left Calf laceration Blood loss anemia - per primary team/ surgery Severe COPD. Pulmonary hypertension. Chronic A. fib. History of endometrial answer ATTR amyloidosis Interval History: 
Seen and examined. Awake,sob  And has wheeze on exam . Cr down to 2.5, UOP 2 liters  L in the past 24 hrs. No reported cp, sob, n/v/d. Review of Systems: A comprehensive review of systems was negative except for that written in the HPI. Current Medications:  
Current Facility-Administered Medications Medication Dose Route Frequency  warfarin (COUMADIN) tablet 4 mg  4 mg Oral ONCE  Warfarin - Pharmacy Dosing   Other Rx Dosing/Monitoring  bumetanide (BUMEX) tablet 1 mg  1 mg Oral DAILY  PARoxetine (PAXIL) tablet 20 mg  20 mg Oral DAILY  albuterol-ipratropium (DUO-NEB) 2.5 MG-0.5 MG/3 ML  3 mL Nebulization Q6H PRN  
 cephALEXin (KEFLEX) capsule 250 mg  250 mg Oral BID  ranolazine ER (RANEXA) tablet 1,000 mg  1,000 mg Oral BID  ferrous sulfate tablet 325 mg  325 mg Oral DAILY WITH BREAKFAST  docusate sodium (COLACE) capsule 100 mg  100 mg Oral BID  hydrOXYzine HCL (ATARAX) tablet 10 mg  10 mg Oral TID PRN  
 glucose chewable tablet 16 g  4 Tab Oral PRN  
 glucagon (GLUCAGEN) injection 1 mg  1 mg IntraMUSCular PRN  
 dextrose 10% infusion 0-250 mL  0-250 mL IntraVENous PRN  
 insulin lispro (HUMALOG) injection   SubCUTAneous AC&HS  sodium chloride (NS) flush 5-40 mL  5-40 mL IntraVENous Q8H  
 sodium chloride (NS) flush 5-40 mL  5-40 mL IntraVENous PRN  
 acetaminophen (TYLENOL) tablet 650 mg  650 mg Oral Q6H PRN Or  
 acetaminophen (TYLENOL) suppository 650 mg  650 mg Rectal Q6H PRN  polyethylene glycol (MIRALAX) packet 17 g  17 g Oral DAILY PRN  promethazine (PHENERGAN) tablet 12.5 mg  12.5 mg Oral Q6H PRN Or  
 ondansetron (ZOFRAN) injection 4 mg  4 mg IntraVENous Q6H PRN No Known Allergies Objective: 
Vitals:   
Vitals:  
 11/15/20 0318 11/15/20 0743 11/15/20 1111 11/15/20 1514 Pulse: 80 88 92 80 Resp: 18 18 18 18 Temp: 98.2 °F (36.8 °C) 98.8 °F (37.1 °C) 98.6 °F (37 °C) 98.5 °F (36.9 °C) SpO2: 96% 97% 97% 96% Weight: 130.2 kg (287 lb 0.6 oz) Intake and Output: 
11/15 0701 - 11/15 1900 In: 100 [P.O.:100] Out: 800 [Urine:800] 11/13 1901 - 11/15 0700 In: 65 [P.O.:370; I.V.:160] Out: 2300 [ENZBN:[id number]] Physical Examination: 
General: Morbidly obese, in NAD HEENT: PERRL, EOMI Neck: Supple,no mass palpable Lungs : CTA 
CVS: RRR, VAD sounds Abdomen: Soft, NT, BS +, obese Extremities: trace b/l LE edema Skin: No rash or lesions. MS: No joint swelling, erythema, warmth Neurologic: awake and alert [x]    High complexity decision making was performed 
[x]    Patient is at high-risk of decompensation with multiple organ involvement Lab Data Personally Reviewed: I have reviewed all the pertinent labs, microbiology data and radiology studies during assessment. Recent Labs 11/15/20 
0335 11/14/20 
7341 11/13/20 
8105 11/13/20 
2777  142  --  139  
K 4.9 5.1  --  4.6 * 110*  --  108 CO2 29 25  --  25 * 130*  --  213* BUN 59* 69*  --  75* CREA 2.50* 2.69*  --  3.08* CA 9.1 8.6  --  8.4* MG 2.4 2.7* 2.9*  --   
ALB 2.9* 2.9*  --  2.8* ALT 11* 12  --  10* INR 2.1* 2.0*  --  2.0* Recent Labs 11/15/20 
0335 11/14/20 
8516 11/13/20 
2198 WBC 5.4 5.5 6.7 HGB 8.3* 8.2* 7.8* HCT 27.4* 26.4* 25.1*  
 164 136* No results found for: SDES Lab Results Component Value Date/Time Culture result: NO GROWTH 5 DAYS 11/10/2020 06:42 PM  
 Culture result: No Growth (<1000 cfu/mL) 11/09/2020 12:05 AM  
 Culture result: SCANT Morganella morganii ssp morganii (A) 10/30/2020 11:30 AM  
 Culture result: SCANT Morganella morganii ssp morganii (A) 10/30/2020 11:30 AM  
 Culture result: RARE DIPHTHEROIDS (A) 10/30/2020 11:30 AM  
 
Recent Results (from the past 24 hour(s)) GLUCOSE, POC Collection Time: 11/14/20  4:47 PM  
Result Value Ref Range Glucose (POC) 136 (H) 65 - 100 mg/dL Performed by Conrad Wright GLUCOSE, POC Collection Time: 11/14/20  9:12 PM  
Result Value Ref Range Glucose (POC) 152 (H) 65 - 100 mg/dL Performed by Corie Barbosa   
PROCALCITONIN Collection Time: 11/15/20  3:30 AM  
Result Value Ref Range Procalcitonin 0.19 ng/mL METABOLIC PANEL, COMPREHENSIVE Collection Time: 11/15/20  3:35 AM  
Result Value Ref Range Sodium 141 136 - 145 mmol/L Potassium 4.9 3.5 - 5.1 mmol/L Chloride 110 (H) 97 - 108 mmol/L  
 CO2 29 21 - 32 mmol/L Anion gap 2 (L) 5 - 15 mmol/L Glucose 127 (H) 65 - 100 mg/dL BUN 59 (H) 6 - 20 MG/DL Creatinine 2.50 (H) 0.55 - 1.02 MG/DL  
 BUN/Creatinine ratio 24 (H) 12 - 20 GFR est AA 23 (L) >60 ml/min/1.73m2 GFR est non-AA 19 (L) >60 ml/min/1.73m2 Calcium 9.1 8.5 - 10.1 MG/DL Bilirubin, total 0.4 0.2 - 1.0 MG/DL  
 ALT (SGPT) 11 (L) 12 - 78 U/L  
 AST (SGOT) 22 15 - 37 U/L Alk. phosphatase 70 45 - 117 U/L Protein, total 7.2 6.4 - 8.2 g/dL Albumin 2.9 (L) 3.5 - 5.0 g/dL Globulin 4.3 (H) 2.0 - 4.0 g/dL A-G Ratio 0.7 (L) 1.1 - 2.2 LACTIC ACID Collection Time: 11/15/20  3:35 AM  
Result Value Ref Range Lactic acid 0.5 0.4 - 2.0 MMOL/L  
LD Collection Time: 11/15/20  3:35 AM  
Result Value Ref Range  81 - 246 U/L  
PROTHROMBIN TIME + INR Collection Time: 11/15/20  3:35 AM  
Result Value Ref Range INR 2.1 (H) 0.9 - 1.1 Prothrombin time 20.8 (H) 9.0 - 11.1 sec CBC W/O DIFF Collection Time: 11/15/20  3:35 AM  
Result Value Ref Range WBC 5.4 3.6 - 11.0 K/uL  
 RBC 3.01 (L) 3.80 - 5.20 M/uL HGB 8.3 (L) 11.5 - 16.0 g/dL HCT 27.4 (L) 35.0 - 47.0 % MCV 91.0 80.0 - 99.0 FL  
 MCH 27.6 26.0 - 34.0 PG  
 MCHC 30.3 30.0 - 36.5 g/dL  
 RDW 17.7 (H) 11.5 - 14.5 % PLATELET 536 090 - 183 K/uL MPV 10.1 8.9 - 12.9 FL  
 NRBC 0.0 0  WBC ABSOLUTE NRBC 0.00 0.00 - 0.01 K/uL MAGNESIUM Collection Time: 11/15/20  3:35 AM  
Result Value Ref Range Magnesium 2.4 1.6 - 2.4 mg/dL NT-PRO BNP Collection Time: 11/15/20  3:35 AM  
Result Value Ref Range NT pro-BNP 17,291 (H) <125 PG/ML  
GLUCOSE, POC Collection Time: 11/15/20  7:08 AM  
Result Value Ref Range Glucose (POC) 130 (H) 65 - 100 mg/dL Performed by Ning Mijares EKG, 12 LEAD, INITIAL Collection Time: 11/15/20 10:09 AM  
Result Value Ref Range Ventricular Rate 79 BPM  
 Atrial Rate 79 BPM  
 P-R Interval 136 ms QRS Duration 190 ms Q-T Interval 450 ms QTC Calculation (Bezet) 516 ms Calculated R Axis -66 degrees Calculated T Axis 89 degrees Diagnosis Normal sinus rhythm Left axis deviation Nonspecific intraventricular block When compared with ECG of 14-NOV-2020 12:08, Previous ECG has undetermined rhythm, needs review Questionable change in QRS duration Criteria for Anterolateral infarct are no longer present GLUCOSE, POC Collection Time: 11/15/20 11:32 AM  
Result Value Ref Range Glucose (POC) 151 (H) 65 - 100 mg/dL Performed by Darryn Trinh  PCT Total time spent with patient:  xxx   min. Care Plan discussed with: 
Patient Family RN Consulting Physician [facility],      
 
I have reviewed the flowsheets. Chart and Pertinent Notes have been reviewed. No change in PMH ,family and social history from Consult note.  
 
 
Deisy Conrad MD

## 2020-11-15 NOTE — PROGRESS NOTES
[address] [facility]  Hospitalist Group Hospitalist Progress Note Isabella Jarrett MD 
Answering service: [phone] OR [phone] from in house phone Date of Service:  2020 NAME:  Kesha Root :  1950 MRN:  [id number] Admission Summary:  
78 y.o. female with past medical history significant for chronic systolic heart failure, ischemic cardiomyopathy with LVAD in place, hypertension, diabetes mellitus type 2 presented emergency room with increased fatigue and fall. Patient was recently admitted to the hospital and discharged on 2020 for similar hospital after being treated for cellulitis of the right lower extremity. Daughter states that since he was discharged patient has been more fatigue and having involuntary movement of her body. During that hospital stay she was started on 2 new medications including Levaquin and mexiletine. Daughter said that yesterday she fell at home injuring her left leg. She has an open wound and to the emergency room. She states that she had wound packed and sent home. Today daughter noticed that the wound started bleeding significantly for which she came to the emergency room. In the emergency room her work-up showed a hemoglobin of 6.9 down from8. As well she was found to have with worsening renal function. Admission was requested for ROCIO and anemia. In the ed wound was significantly bleeding. ED physician stopped bleeding with quick clot and its currently wrapped.  
  
 
Interval history / Subjective:  
Patient seen and examined  Continues to have involuntary movements. Otherwise,denied any chest pain,SOB. Assessment & Plan:  
 
New right leg wound, s/p fall Acute on chronic anemia: Likely secondary to bleed from leg wound -s/p 3 unit pRBCs 
-no other source of bleeding identified -s/p general surgery consult with bedside debridement 11/10 
-Xray negative for fracture Completed course of  cefepime-  switch to keflex  
-Hb stable. Acute on chronic renal failure: Multifactorial likely secondary to hypovolemia due to bleed 
-nephrology and Advanced heart failure following   
-diuretics resumed today by nephrology team -on bumex Involuntary movements: 
-reviewed PT/OT note 
-neurology consulted, appreciate recommendations Metabolic encephalopathy:resolved 
 
  
Hyperkalemia: resolved, due to ROCIO 
-s/p regular insulin plus glucose, calcium gluconate, amp of bicarb 
  
Chronic systolic heart failure NYHA class IV, ischemic cardiomyopathy status post LVAD Continue with Coreg 12.5 mg twice daily. On bumex INR stable Not on Entresto ACE inhibitor, ARB is due to CKD 
-Advanced heart failure following NSVT: recurrent 
-previously on mexiltine but patient reports dizziness 
-EP consulted. Placed on ranexa 
-Discussed with , manual measurement of Q tc wnl- continue ranexa. Chronic LVAD infection:  
-ID consulted, resume keflex 
  
Diabetes mellitus type 2: 
Sliding scale insulin before meals and at bedtime,blood glucose controlled 
  
COPD: stable-Continue maintenance inhaled steroids. Bronchodilators as needed. 
  
Depression: stable.  paxil dose decreased Morbid obesity: Body mass index is 46.34 kg/m².  OP management 
 
  
Code status: full DVT prophylaxis: theurapeutic INR Care Plan discussed with: Patient/Family Anticipated Disposition: Home w/Family Anticipated Discharge: Greater than 48 hours Hospital Problems  Date Reviewed: 10/28/2020 Codes Class Noted POA Coagulopathy ([facility] [address] ICD-10-CM: X33.3 ICD-9-CM: 286.9  11/10/2020 Unknown Open wound of left lower leg ICD-10-CM: V90.041W ICD-9-CM: 891.0  11/10/2020 Unknown Anemia ICD-10-CM: D64.9 ICD-9-CM: 285.9  11/9/2020 Unknown ROCIO (acute kidney injury) ([facility] ICD-10-CM: N17.9 ICD-9-CM: 584.9  11/9/2020 Unknown NICM (nonischemic cardiomyopathy) ([facility] ICD-10-CM: I42.8 ICD-9-CM: 425.4  11/4/2020 Yes Chronic venous insufficiency ICD-10-CM: I87.2 ICD-9-CM: 459.81  11/4/2020 Yes Acute on chronic systolic CHF (congestive heart failure) (HCC) ICD-10-CM: N41.66 ICD-9-CM: 428.23, 428.0  10/27/2020 Yes Obesity, morbid ([facility] ICD-10-CM: E66.01 
ICD-9-CM: 278.01  10/1/2020 Yes Review of Systems:  
Negative unless stated above Vital Signs:  
 Last 24hrs VS reviewed since prior progress note. Most recent are: 
Visit Vitals Pulse 80 Temp 97.5 °F (36.4 °C) Resp 18 Wt 130.9 kg (288 lb 9.6 oz) SpO2 95% BMI 52.79 kg/m² Intake/Output Summary (Last 24 hours) at 11/14/2020 2155 Last data filed at 11/14/2020 1510 Gross per 24 hour Intake 300 ml Output 950 ml Net -650 ml Physical Examination:  
 
 
 
     
Constitutional:  no acute distress ENT:  Oral mucosa moist, oropharynx benign. Resp:  CTA bilaterally. No wheezing,no accessory muscle use CV:  LVAD hum, no murmurs, gallops, rubs GI:  Soft, non distended, obese, non tender. Musculoskeletal:  No edema, left leg wrapped, reviewed open wound pictures Neurologic:  alert and oriented X 3,moving all extremities, invontary jerky movements UE and LE Skin: left leg skin tear Data Review:  
 Review and/or order of clinical lab test 
 
Review and/or order of tests in the medicine section of CPT Labs:  
 
Recent Labs 11/14/20 
0245 11/13/20 
0398 WBC 5.5 6.7 HGB 8.2* 7.8* HCT 26.4* 25.1*  
 136* Recent Labs 11/14/20 
0121 11/13/20 
1967 11/13/20 
2286 11/12/20 
9559   --  139 140  
K 5.1  --  4.6 4.6 *  --  108 108 CO2 25  --  25 26 BUN 69*  --  75* 96* CREA 2.69*  --  3.08* 3.88* *  --  213* 113* CA 8.6  --  8.4* 8.7 MG 2.7* 2.9*  --  3.3*  
 URICA  --   --   --  12.4* Recent Labs 11/14/20 
1829 11/13/20 
3644 11/12/20 
4592 ALT 12 10* 10* AP 73 74 69 TBILI 0.4 0.4 0.5 TP 7.2 6.7 6.8 ALB 2.9* 2.8* 3.0*  
GLOB 4.3* 3.9 3.8 Recent Labs 11/14/20 
2790 11/13/20 
3212 11/12/20 
0544 INR 2.0* 2.0* 2.3* PTP 20.0* 20.3* 22.7* Recent Labs 11/12/20 
1446 11/12/20 
0544 TIBC 197*  --   
PSAT 13*  --   
FERR  --  431* Lab Results Component Value Date/Time Folate 10.0 10/28/2020 03:56 AM  
  
No results for input(s): PH, PCO2, PO2 in the last 72 hours. No results for input(s): CPK, CKNDX, TROIQ in the last 72 hours. No lab exists for component: CPKMB Lab Results Component Value Date/Time Cholesterol, total 192 10/01/2020 12:00 AM  
 HDL Cholesterol 29 (L) 10/01/2020 12:00 AM  
 LDL Chol Calc (NIH) 123 (H) 10/01/2020 12:00 AM  
 Triglyceride 222 (H) 10/01/2020 12:00 AM  
 
Lab Results Component Value Date/Time Glucose (POC) 152 (H) 11/14/2020 09:12 PM  
 Glucose (POC) 136 (H) 11/14/2020 04:47 PM  
 Glucose (POC) 171 (H) 11/14/2020 11:25 AM  
 Glucose (POC) 151 (H) 11/14/2020 06:56 AM  
 Glucose (POC) 211 (H) 11/13/2020 10:22 PM  
 
Lab Results Component Value Date/Time Color Yellow/Straw 11/09/2020 12:05 AM  
 Appearance Turbid (A) 11/09/2020 12:05 AM  
 Specific gravity 1.013 11/09/2020 12:05 AM  
 pH (UA) 5.0 11/09/2020 12:05 AM  
 Protein 100 (A) 11/09/2020 12:05 AM  
 Glucose Negative 11/09/2020 12:05 AM  
 Ketone Negative 11/09/2020 12:05 AM  
 Bilirubin Negative 11/09/2020 12:05 AM  
 Urobilinogen 0.1 11/09/2020 12:05 AM  
 Nitrites Negative 11/09/2020 12:05 AM  
 Leukocyte Esterase Trace (A) 11/09/2020 12:05 AM  
 Bacteria Negative 11/09/2020 12:05 AM  
 WBC 0-5 11/09/2020 12:05 AM  
 RBC 0-5 11/09/2020 12:05 AM  
 
 
 
Medications Reviewed:  
 
Current Facility-Administered Medications Medication Dose Route Frequency  [START ON 11/15/2020] PARoxetine (PAXIL) tablet 20 mg  20 mg Oral DAILY  Warfarin Pharmacy Dosing   Other PRN  
 albuterol-ipratropium (DUO-NEB) 2.5 MG-0.5 MG/3 ML  3 mL Nebulization Q6H PRN  
 ranolazine ER (RANEXA) tablet 1,000 mg  1,000 mg Oral BID  cefepime (MAXIPIME) 1 g in 0.9% sodium chloride (MBP/ADV) 50 mL MBP  1 g IntraVENous Q24H  Warfarin- MD/NP to Dose    Other Rx Dosing/Monitoring  ferrous sulfate tablet 325 mg  325 mg Oral DAILY WITH BREAKFAST  docusate sodium (COLACE) capsule 100 mg  100 mg Oral BID  hydrOXYzine HCL (ATARAX) tablet 10 mg  10 mg Oral TID PRN  
 glucose chewable tablet 16 g  4 Tab Oral PRN  
 glucagon (GLUCAGEN) injection 1 mg  1 mg IntraMUSCular PRN  
 dextrose 10% infusion 0-250 mL  0-250 mL IntraVENous PRN  
 insulin lispro (HUMALOG) injection   SubCUTAneous AC&HS  sodium chloride (NS) flush 5-40 mL  5-40 mL IntraVENous Q8H  
 sodium chloride (NS) flush 5-40 mL  5-40 mL IntraVENous PRN  
 acetaminophen (TYLENOL) tablet 650 mg  650 mg Oral Q6H PRN Or  
 acetaminophen (TYLENOL) suppository 650 mg  650 mg Rectal Q6H PRN  polyethylene glycol (MIRALAX) packet 17 g  17 g Oral DAILY PRN  promethazine (PHENERGAN) tablet 12.5 mg  12.5 mg Oral Q6H PRN Or  
 ondansetron (ZOFRAN) injection 4 mg  4 mg IntraVENous Q6H PRN  
 
______________________________________________________________________ EXPECTED LENGTH OF STAY: 4d 0h 
ACTUAL LENGTH OF STAY:          5 Jagjit Krause MD

## 2020-11-15 NOTE — PROGRESS NOTES
1930: Bedside shift change report given to [employer] (oncoming nurse) by Hugh Maurer (offgoing nurse). Report included the following information SBAR, Kardex, Intake/Output, MAR, Recent Results, and Cardiac Rhythm paced . Problem: Falls - Risk of 
Goal: *Absence of Falls Description: Document Jovanni Apo Fall Risk and appropriate interventions in the flowsheet. Outcome: Progressing Towards Goal 
Note: Fall Risk Interventions: 
Mobility Interventions: Communicate number of staff needed for ambulation/transfer, PT Consult for mobility concerns, Patient to call before getting OOB, OT consult for ADLs Medication Interventions: Patient to call before getting OOB, Teach patient to arise slowly Elimination Interventions: Call light in reach, Patient to call for help with toileting needs, Toileting schedule/hourly rounds History of Falls Interventions: Consult care management for discharge planning, Bed/chair exit alarm, Door open when patient unattended, Investigate reason for fall Problem: Pressure Injury - Risk of 
Goal: *Prevention of pressure injury Description: Document Jaime Scale and appropriate interventions in the flowsheet. Outcome: Progressing Towards Goal 
Note: Pressure Injury Interventions: 
Sensory Interventions: Assess need for specialty bed, Minimize linen layers, Keep linens dry and wrinkle-free, Monitor skin under medical devices Moisture Interventions: Check for incontinence Q2 hours and as needed, Internal/External urinary devices Activity Interventions: Increase time out of bed, Pressure redistribution bed/mattress(bed type) Mobility Interventions: Pressure redistribution bed/mattress (bed type) Nutrition Interventions: Document food/fluid/supplement intake, Offer support with meals,snacks and hydration Friction and Shear Interventions: Apply protective barrier, creams and emollients, HOB 30 degrees or less, Lift sheet

## 2020-11-15 NOTE — PROGRESS NOTES
[address] [facility] Adult  Hospitalist Group Hospitalist Progress Note Usha Boswell MD 
Answering service: [phone] OR [phone] from in house phone Date of Service:  11/15/2020 NAME:  Chandra Lesch :  1950 MRN:  [id number] Admission Summary:  
78 y.o. female with past medical history significant for chronic systolic heart failure, ischemic cardiomyopathy with LVAD in place, hypertension, diabetes mellitus type 2 presented emergency room with increased fatigue and fall. Patient was recently admitted to the hospital and discharged on 2020 for similar hospital after being treated for cellulitis of the right lower extremity. Daughter states that since he was discharged patient has been more fatigue and having involuntary movement of her body. During that hospital stay she was started on 2 new medications including Levaquin and mexiletine. Daughter said that yesterday she fell at home injuring her left leg. She has an open wound and to the emergency room. She states that she had wound packed and sent home. Today daughter noticed that the wound started bleeding significantly for which she came to the emergency room. In the emergency room her work-up showed a hemoglobin of 6.9 down from8. As well she was found to have with worsening renal function. Admission was requested for ROCIO and anemia. In the ed wound was significantly bleeding. ED physician stopped bleeding with quick clot and its currently wrapped.  
  
 
Interval history / Subjective:  
Patient seen and examined 11/15 She still has jerky movements when attempting to holding objects. Denied chest pain,SOB. Assessment & Plan:  
 
Right leg wound, s/p fall prior to admisison Acute on chronic anemia: Likely secondary to bleed from leg wound -s/p 3 unit pRBCs 
-no other source of bleeding identified -s/p general surgery consult with bedside debridement 11/10 
-Xray negative for fracture Completed course of  cefepime-  switch to keflex  
-Hb stable. Acute on chronic renal failure:  
-ATN secondary to hypovolemia due to bleed 
-nephrology and Advanced heart failure following   
-she is on IV bumex 1 mg Involuntary movements: 
--neurology consulted, appreciate recommendations- gabapentin held and paxil dose reduced. Metabolic encephalopathy:resolved 
 
  
Hyperkalemia: resolved, due to ROCIO 
 
  
Chronic systolic heart failure NYHA class IV, ischemic cardiomyopathy status post LVAD Continue with Coreg 12.5 mg twice daily. On bumex INR stable Not on Entresto ACE inhibitor, ARB is due to CKD 
-Advanced heart failure following NSVT: recurrent 
-previously on mexiltine but patient reported dizziness 
-EP consulted-continue  Ranexa, Qtc acceptable. Chronic LVAD infection:  
-ID consulted, resumed keflex 
  
Diabetes mellitus type 2: 
Sliding scale insulin before meals and at bedtime,blood glucose controlled 
  
COPD: stable-Continue maintenance inhaled steroids. Bronchodilators as needed. 
  
Depression: stable.  paxil dose decreased Morbid obesity: Body mass index is 46.34 kg/m².  OP management 
 
  
Code status: full DVT prophylaxis: theurapeutic INR Care Plan discussed with: Patient/Family Anticipated Disposition: rehab Anticipated Discharge: Greater than 48 hours Hospital Problems  Date Reviewed: 10/28/2020 Codes Class Noted POA [facility] ICD-10-CM: K86.6 ICD-9-CM: 286.9  11/10/2020 Unknown Open wound of left lower leg ICD-10-CM: B67.227Q ICD-9-CM: 891.0  11/10/2020 Unknown Anemia ICD-10-CM: D64.9 ICD-9-CM: 285.9  11/9/2020 Unknown ROCIO (acute kidney injury) ([facility] ICD-10-CM: N17.9 ICD-9-CM: 584.9  11/9/2020 Unknown NICM (nonischemic cardiomyopathy) ([facility] ICD-10-CM: I42.8 ICD-9-CM: 425.4  11/4/2020 Yes Chronic venous insufficiency ICD-10-CM: I87.2 ICD-9-CM: 459.81  11/4/2020 Yes Acute on chronic systolic CHF (congestive heart failure) (HCC) ICD-10-CM: E90.58 ICD-9-CM: 428.23, 428.0  10/27/2020 Yes Obesity, morbid ([facility] ICD-10-CM: E66.01 
ICD-9-CM: 278.01  10/1/2020 Yes Review of Systems:  
Negative unless stated above Vital Signs:  
 Last 24hrs VS reviewed since prior progress note. Most recent are: 
Visit Vitals Pulse 80 Temp 98.5 °F (36.9 °C) Resp 18 Wt 130.2 kg (287 lb 0.6 oz) SpO2 96% BMI 52.50 kg/m² Intake/Output Summary (Last 24 hours) at 11/15/2020 1757 Last data filed at 11/15/2020 1514 Gross per 24 hour Intake 100 ml Output 1850 ml Net -1750 ml Physical Examination:  
 
 
 
     
Constitutional:  no acute distress ENT:  Oral mucosa moist, oropharynx benign. Resp:  CTA bilaterally. No wheezing,no accessory muscle use CV:  LVAD hum, no murmurs, gallops, rubs GI:  Soft, non distended, obese, non tender. Musculoskeletal:  No edema, left leg wrapped, reviewed open wound pictures Neurologic:  alert and oriented X 3,moving all extremities, jerky movements UE and LE with action Skin: left leg skin tear Data Review:  
 Review and/or order of clinical lab test 
 
Review and/or order of tests in the medicine section of [facility] Labs:  
 
Recent Labs 11/15/20 
0335 11/14/20 
9807 WBC 5.4 5.5 HGB 8.3* 8.2* HCT 27.4* 26.4*  
 164 Recent Labs 11/15/20 
0335 11/14/20 
3363 11/13/20 
4406 11/13/20 
7329  142  --  139  
K 4.9 5.1  --  4.6 * 110*  --  108 CO2 29 25  --  25  
BUN 59* 69*  --  75* CREA 2.50* 2.69*  --  3.08* * 130*  --  213* CA 9.1 8.6  --  8.4* MG 2.4 2.7* 2.9*  --   
 
Recent Labs 11/15/20 
0335 11/14/20 
6424 11/13/20 
6493 ALT 11* 12 10* AP 70 73 74 TBILI 0.4 0.4 0.4 TP 7.2 7.2 6.7 ALB 2.9* 2.9* 2.8*  
GLOB 4.3* 4.3* 3.9 Recent Labs 11/15/20 
0335 11/14/20 
2428 11/13/20 
5823 INR 2.1* 2.0* 2.0*  
PTP 20.8* 20.0* 20.3* No results for input(s): FE, TIBC, PSAT, FERR in the last 72 hours. Lab Results Component Value Date/Time Folate 10.0 10/28/2020 03:56 AM  
  
No results for input(s): PH, PCO2, PO2 in the last 72 hours. No results for input(s): CPK, CKNDX, TROIQ in the last 72 hours. No lab exists for component: CPKMB Lab Results Component Value Date/Time Cholesterol, total 192 10/01/2020 12:00 AM  
 HDL Cholesterol 29 (L) 10/01/2020 12:00 AM  
 LDL Chol Calc (NIH) 123 (H) 10/01/2020 12:00 AM  
 Triglyceride 222 (H) 10/01/2020 12:00 AM  
 
Lab Results Component Value Date/Time Glucose (POC) 140 (H) 11/15/2020 04:34 PM  
 Glucose (POC) 151 (H) 11/15/2020 11:32 AM  
 Glucose (POC) 130 (H) 11/15/2020 07:08 AM  
 Glucose (POC) 152 (H) 11/14/2020 09:12 PM  
 Glucose (POC) 136 (H) 11/14/2020 04:47 PM  
 
Lab Results Component Value Date/Time Color Yellow/Straw 11/09/2020 12:05 AM  
 Appearance Turbid (A) 11/09/2020 12:05 AM  
 Specific gravity 1.013 11/09/2020 12:05 AM  
 pH (UA) 5.0 11/09/2020 12:05 AM  
 Protein 100 (A) 11/09/2020 12:05 AM  
 Glucose Negative 11/09/2020 12:05 AM  
 Ketone Negative 11/09/2020 12:05 AM  
 Bilirubin Negative 11/09/2020 12:05 AM  
 Urobilinogen 0.1 11/09/2020 12:05 AM  
 Nitrites Negative 11/09/2020 12:05 AM  
 Leukocyte Esterase Trace (A) 11/09/2020 12:05 AM  
 Bacteria Negative 11/09/2020 12:05 AM  
 WBC 0-5 11/09/2020 12:05 AM  
 RBC 0-5 11/09/2020 12:05 AM  
 
 
 
Medications Reviewed:  
 
Current Facility-Administered Medications Medication Dose Route Frequency  Warfarin - Pharmacy Dosing   Other Rx Dosing/Monitoring  bumetanide (BUMEX) tablet 1 mg  1 mg Oral DAILY  PARoxetine (PAXIL) tablet 20 mg  20 mg Oral DAILY  albuterol-ipratropium (DUO-NEB) 2.5 MG-0.5 MG/3 ML  3 mL Nebulization Q6H PRN  
 cephALEXin (KEFLEX) capsule 250 mg  250 mg Oral BID  
  ranolazine ER (RANEXA) tablet 1,000 mg  1,000 mg Oral BID  ferrous sulfate tablet 325 mg  325 mg Oral DAILY WITH BREAKFAST  docusate sodium (COLACE) capsule 100 mg  100 mg Oral BID  hydrOXYzine HCL (ATARAX) tablet 10 mg  10 mg Oral TID PRN  
 glucose chewable tablet 16 g  4 Tab Oral PRN  
 glucagon (GLUCAGEN) injection 1 mg  1 mg IntraMUSCular PRN  
 dextrose 10% infusion 0-250 mL  0-250 mL IntraVENous PRN  
 insulin lispro (HUMALOG) injection   SubCUTAneous AC&HS  sodium chloride (NS) flush 5-40 mL  5-40 mL IntraVENous Q8H  
 sodium chloride (NS) flush 5-40 mL  5-40 mL IntraVENous PRN  
 acetaminophen (TYLENOL) tablet 650 mg  650 mg Oral Q6H PRN Or  
 acetaminophen (TYLENOL) suppository 650 mg  650 mg Rectal Q6H PRN  polyethylene glycol (MIRALAX) packet 17 g  17 g Oral DAILY PRN  promethazine (PHENERGAN) tablet 12.5 mg  12.5 mg Oral Q6H PRN Or  
 ondansetron (ZOFRAN) injection 4 mg  4 mg IntraVENous Q6H PRN  
 
______________________________________________________________________ EXPECTED LENGTH OF STAY: 4d 0h 
ACTUAL LENGTH OF STAY:          6 Rose Platt MD

## 2020-11-15 NOTE — PROGRESS NOTES
Cardiac   Hospital Progress Note REFERRING PROVIDER: Dr Nettie Landeros Subjective:  
  
Reece Ramirez is a 78 y.o. patient who is seen for evaluation/management of VT & mexiletine intolerance. She was readmitted on 11/09/2020 after a fall, suffered leg wound bleeding. She had been discharged on Levaquin, still being treated for leg cellulitis (Morganella morganii), has renal failure, advanced systolic CHF, & anemia She had started mexiletine on 11/02/2020 during a recent admission for control of recurrent VT. Her daughter then reported that she was unsteady, fatigued, nauseated, & shaky with Levaquin & mexiletine. Antibiotic changed to cefepime IV. Stopped mexiletine this admission, started ranolazine 500 mg po bid on 11/11/2020. No more NSVT last night, and QTc .51 only jsut occ PVC. Atrial & ventricular pacing.   
  
RV & LV failure, is s/p LVAD in 2017 at [employer] [facility].  NYHA III chronic systolic CHF. On appropriate GDMT. 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 
  
[employer] dual chamber AICD had been checked earlier this month, showed proper function. Pacing rate 70 bpm to suppress PVCs. Anticoagulated with warfarin, but recommendation of general surgery to hold to allow for leg wound healing. [facility] has lowered target INR to 2-2.5 (2.3 today). 
 
   
Previous: 
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 Echo (11/02/2020): LVEF 15-20%, LVAD in place. Mildly dilated RV with mildly reduced RVEF. Mildly dilated LA. Aortic valve sclerosis without stenosis. 
  
Treated for abscess of sternal wound since late 2019. 
  
[facility] (11/09/2018): 2 vessel disease with 90% OM, 80% PDA, DSA to PDA branch 
  
S/p PCI approx 2017. Reports history of Plavix resistance. 
  
[facility] (08/2016): High grade ramus & small PDA disease, borderline disease of LAD & takeoff of pRCA.  
  
 Previously followed by [facility], now sees Dr. Sydelle Bosworth. 
  
Mother with CAD,  age 76. Father  age [de-identified], CAD & MI.  Brother with valve replacement. 
  
 
Patient Active Problem List  
Diagnosis Code  Ob[facility], morbid [facility] E66.01  
 Acute on chronic systolic CHF (congestive heart failure) ([facility]) I50.23  
 NICM (nonischemic cardiomyopathy) ([facility] I42.8  Coronary artery disease involving native coronary artery of native heart without angina pectoris I25.10  
 JED on CPAP G47.33, Z99.89  Chronic venous insufficiency I87.2  Ulcer of right foot, limited to breakdown of skin ([facility]) L97.521  
 NSVT (nonsustained ventricular tachycardia) ([facility]) I47.2  Anemia D64.9  ROCIO (acute kidney injury) ([facility] N17.9  Coagulopathy ([facility] D68.9  
 Open wound of left lower leg S81.802A Current Facility-Administered Medications Medication Dose Route Frequency Provider Last Rate Last Dose  PARoxetine (PAXIL) tablet 20 mg  20 mg Oral DAILY Albetr Akhtar K, DO   20 mg at 11/15/20 0809  Warfarin Pharmacy Dosing   Other PRN Sailaja Martinez MD      
 albuterol-ipratropium (DUO-NEB) 2.5 MG-0.5 MG/3 ML  3 mL Nebulization Q6H PRN Lam Gipson MD   3 mL at 20   cephALEXin (KEFLEX) capsule 250 mg  250 mg Oral BID Damaso Castillo MD   250 mg at 11/15/20 0809  
 ranolazine ER (RANEXA) tablet 1,000 mg  1,000 mg Oral BID Denise Ford MD   1,000 mg at 11/15/20 3170  Warfarin- MD/NP to Dose    Other Rx Dosing/Monitoring Kenny Walter NP      
 ferrous sulfate tablet 325 mg  325 mg Oral DAILY WITH BREAKFAST Meche Ventura, DO   325 mg at 11/15/20 0809  
 docusate sodium (COLACE) capsule 100 mg  100 mg Oral BID Algbrett Dumont M, DO   100 mg at 11/15/20 5778  hydrOXYzine HCL (ATARAX) tablet 10 mg  10 mg Oral TID PRN Algbrett Rocker M, DO      
 glucose chewable tablet 16 g  4 Tab Oral PRN Ruth Brooks, DO      
  glucagon (GLUCAGEN) injection 1 mg  1 mg IntraMUSCular PRN Adin Nearing M, DO      
 dextrose 10% infusion 0-250 mL  0-250 mL IntraVENous PRN Adin Nearing M, DO      
 insulin lispro (HUMALOG) injection   SubCUTAneous AC&HS Adin Nearing M, DO   Stopped at 11/14/20 1630  
 sodium chloride (NS) flush 5-40 mL  5-40 mL IntraVENous Q8H Dori Chapman MD   10 mL at 11/15/20 8255  sodium chloride (NS) flush 5-40 mL  5-40 mL IntraVENous PRN Dori Chapman MD      
 acetaminophen (TYLENOL) tablet 650 mg  650 mg Oral Q6H PRN Dori Chapman MD   650 mg at 11/13/20 1004 Or  acetaminophen (TYLENOL) suppository 650 mg  650 mg Rectal Q6H PRN Dori Chapman MD      
 polyethylene glycol [facility]) packet 17 g  17 g Oral DAILY PRN Dori Chapman MD      
 promethazine (PHENERGAN) tablet 12.5 mg  12.5 mg Oral Q6H PRN Dori Chapman MD      
 Or  
 ondansetron [facility]) injection 4 mg  4 mg IntraVENous Q6H PRN Dori Chapman MD      
 
No Known Allergies Past Medical History:  
Diagnosis Date  Asthma  Cancer ([facility] breast  
 Cancer ([facility]   
 endometrial  
 Congestive heart failure, unspecified  CRI (chronic renal insufficiency)  Depression  Diabetes ([facility]  Hypertension Past Surgical History:  
Procedure Laterality Date  HX HERNIA REPAIR    
 HX HYSTERECTOMY  HX MASTECTOMY See HPI for family history. Social History Tobacco Use  Smoking status: Never Smoker  Smokeless tobacco: Never Used Substance Use Topics  Alcohol use: Not Currently Review of Systems: All other review of systems otherwise negative. Constitutional: Negative for fever, chills, weight loss, + malaise/fatigue. HEENT: Negative for nosebleeds, vision changes. Respiratory: Negative for cough, hemoptysis Cardiovascular: Negative for chest pain, palpitations, orthopnea, claudication, + leg swelling, no syncope, and PND. Gastrointestinal: Negative for nausea, vomiting, diarrhea, blood in stool and melena. Genitourinary: Negative for dysuria, and hematuria. Musculoskeletal: Negative for myalgias, + arthralgia. + involuntary muscle jerking but has been less often Skin: Negative for rash. + left leg wound. Heme: + bleed or bruise easily. Neurological: Negative for speech change and + general weakness Objective: LVAD Visit Vitals Pulse 88 Temp 98.8 °F (37.1 °C) Resp 18 Wt 287 lb 0.6 oz (130.2 kg) SpO2 97% BMI 52.50 kg/m² Physical Exam:  
Constitutional: Well-developed and well-nourished. No respiratory distress. Head: Normocephalic and atraumatic. Eyes: Pupils are equal, round ENT: Hearing grossly normal 
Neck: Supple. No JVD present. Cardiovascular: LVAD sounds. Pulmonary/Chest: Effort normal and breath sounds normal. No wheezes. Abdominal: Morbidly obese Musculoskeletal: Moves extremities independently. Neurological: Alert,oriented. Skin: Skin is warm and dry. Dressings to leg wounds are clean Psychiatric: Normal mood and affect. Behavior is normal. Judgment and thought content normal.   
 
CMP:  
Lab Results Component Value Date/Time  11/15/2020 03:35 AM  
 K 4.9 11/15/2020 03:35 AM  
  (H) 11/15/2020 03:35 AM  
 CO2 29 11/15/2020 03:35 AM  
 AGAP 2 (L) 11/15/2020 03:35 AM  
  (H) 11/15/2020 03:35 AM  
 BUN 59 (H) 11/15/2020 03:35 AM  
 CREA 2.50 (H) 11/15/2020 03:35 AM  
 GFRAA 23 (L) 11/15/2020 03:35 AM  
 GFRNA 19 (L) 11/15/2020 03:35 AM  
 CA 9.1 11/15/2020 03:35 AM  
 MG 2.4 11/15/2020 03:35 AM  
 ALB 2.9 (L) 11/15/2020 03:35 AM  
 TP 7.2 11/15/2020 03:35 AM  
 GLOB 4.3 (H) 11/15/2020 03:35 AM  
 AGRAT 0.7 (L) 11/15/2020 03:35 AM  
 ALT 11 (L) 11/15/2020 03:35 AM  
 
CBC:  
Lab Results Component Value Date/Time  WBC 5.4 11/15/2020 03:35 AM  
 HGB 8.3 (L) 11/15/2020 03:35 AM  
 HCT 27.4 (L) 11/15/2020 03:35 AM  
  11/15/2020 03:35 AM  
  
COAGS:  
 Lab Results Component Value Date/Time PTP 20.8 (H) 11/15/2020 03:35 AM  
 INR 2.1 (H) 11/15/2020 03:35 AM  
 
 
Assessment/Plan: Ms. Jayant Underwood has RV & LV failure, is s/p LVAD in 2017 at [facility].   
No ACEi/ARB due to CKD, but otherwise on appropriate GDMT. Nuclear PYP scan indicated amyloid ATTR type-await medical therapy if can get assistance with drug cost per Dr Jessica Valera Previously noted nausea with mexiletine for recurrent VT, now with questionable neurologic side effect (involuntary muscle jerking). Mexiletine has been discontinued. Not a good candidate for amiodarone given RV failure. Ablation is not possible given ongoing infection, LVAD, morbid obesity, severe anemia, & JED. Non sustained and asymptomatic recurrent VT noted day 1 after switching mexiletine to ranolazine 500 mg po bid (off label use). QT ok today on 1000 mg po bid but given her chronic renal failure and creatinine 3, 12 lead ECG will be ordered today for QT measurement She had ECG 11/9 with AV pacing and QT interval 420 ms at 70 bpm pacing If QTc is longer than 550 ms with ventricular pacing, ranexa dose has to be reduced or even stopped->550 on telemetry measurement, EKG pending again today but tele . 51measurement. Coreg has been stopped due to low pressure and RV failure NSVT rate was slow 150 bpm and QRS complexes are wide indicating epicardial type, potassium was normal 
She has LVAD so epicardial ablation will not be feasible even if she is stable enough for the procedure Right Atrial & right ventricular pacing, she has [employer] dual chamber ICD; recent check showed proper function. 2 years of battery left. Upgrading to [facility] ICD would not be likely helping her as she has LVAD in place and amyloid cardiomyopathy Thank you for involving me in this patient's care and please call with further concerns or questions.  
 
Andrew Wetzel MD 
            
 [phone]

## 2020-11-16 NOTE — PROGRESS NOTES
0730: Bedside and Verbal shift change report given to vijay bustamante (oncoming nurse) by cordelia rn (offgoing nurse). Report included the following information SBAR, Kardex, Intake/Output, MAR and Recent Results. 1225: LVAD dressing changed using sterile procedure, pt tolerated well, no abnormalities noted Problem: Diabetes Self-Management Goal: *Disease process and treatment process Description: Define diabetes and identify own type of diabetes; list 3 options for treating diabetes. Outcome: Progressing Towards Goal 
Goal: *Incorporating nutritional management into lifestyle Description: Describe effect of type, amount and timing of food on blood glucose; list 3 methods for planning meals. Outcome: Progressing Towards Goal 
Goal: *Incorporating physical activity into lifestyle Description: State effect of exercise on blood glucose levels. Outcome: Progressing Towards Goal 
Goal: *Developing strategies to promote health/change behavior Description: Define the ABC's of diabetes; identify appropriate screenings, schedule and personal plan for screenings. Outcome: Progressing Towards Goal 
Goal: *Using medications safely Description: State effect of diabetes medications on diabetes; name diabetes medication taking, action and side effects. Outcome: Progressing Towards Goal 
Goal: *Monitoring blood glucose, interpreting and using results Description: Identify recommended blood glucose targets  and personal targets. Outcome: Progressing Towards Goal 
Goal: *Prevention, detection, treatment of acute complications Description: List symptoms of hyper- and hypoglycemia; describe how to treat low blood sugar and actions for lowering  high blood glucose level. Outcome: Progressing Towards Goal 
Goal: *Prevention, detection and treatment of chronic complications Description: Define the natural course of diabetes and describe the relationship of blood glucose levels to long term complications of diabetes. Outcome: Progressing Towards Goal 
Goal: *Developing strategies to address psychosocial issues Description: Describe feelings about living with diabetes; identify support needed and support network Outcome: Progressing Towards Goal 
Goal: *Patient Specific Goal (EDIT GOAL, INSERT TEXT) Outcome: Progressing Towards Goal 
  
Problem: Patient Education: Go to Patient Education Activity Goal: Patient/Family Education Outcome: Progressing Towards Goal 
  
Problem: Falls - Risk of 
Goal: *Absence of Falls Description: Document Pati Yao Fall Risk and appropriate interventions in the flowsheet. Outcome: Progressing Towards Goal 
Note: Fall Risk Interventions: 
Mobility Interventions: Patient to call before getting OOB, Communicate number of staff needed for ambulation/transfer Medication Interventions: Teach patient to arise slowly, Patient to call before getting OOB Elimination Interventions: Patient to call for help with toileting needs, Call light in reach History of Falls Interventions: Door open when patient unattended, Investigate reason for fall Problem: Patient Education: Go to Patient Education Activity Goal: Patient/Family Education Outcome: Progressing Towards Goal 
  
Problem: Pressure Injury - Risk of 
Goal: *Prevention of pressure injury Description: Document Jaime Scale and appropriate interventions in the flowsheet. Outcome: Progressing Towards Goal 
Note: Pressure Injury Interventions: 
Sensory Interventions: Pressure redistribution bed/mattress (bed type) Moisture Interventions: Internal/External urinary devices Activity Interventions: Increase time out of bed, Pressure redistribution bed/mattress(bed type) Mobility Interventions: Pressure redistribution bed/mattress (bed type) Nutrition Interventions: Document food/fluid/supplement intake Friction and Shear Interventions: HOB 30 degrees or less, Foam dressings/transparent film/skin sealants, Minimize layers Problem: Patient Education: Go to Patient Education Activity Goal: Patient/Family Education Outcome: Progressing Towards Goal 
  
Problem: Heart Failure: Day 5 Goal: Activity/Safety Outcome: Progressing Towards Goal 
Goal: Diagnostic Test/Procedures Outcome: Progressing Towards Goal 
Goal: Nutrition/Diet Outcome: Progressing Towards Goal 
Goal: Discharge Planning Outcome: Progressing Towards Goal 
Goal: Medications Outcome: Progressing Towards Goal 
Goal: Respiratory Outcome: Progressing Towards Goal 
Goal: Treatments/Interventions/Procedures Outcome: Progressing Towards Goal 
Goal: Psychosocial 
Outcome: Progressing Towards Goal 
  
Problem: Heart Failure: Discharge Outcomes Goal: *Demonstrates ability to perform prescribed activity without shortness of breath or discomfort Outcome: Progressing Towards Goal 
Goal: *Left ventricular function assessment completed prior to or during stay, or planned for post-discharge Outcome: Progressing Towards Goal 
Goal: *ACEI prescribed if LVEF less than 40% and no contraindications or ARB prescribed Outcome: Progressing Towards Goal 
Goal: *Verbalizes understanding and describes prescribed diet Outcome: Progressing Towards Goal 
Goal: *Verbalizes understanding/describes prescribed medications Outcome: Progressing Towards Goal 
Goal: *Describes available resources and support systems Description: (eg: Home Health, Palliative Care, Advanced Medical Directive) Outcome: Progressing Towards Goal 
Goal: *Describes smoking cessation resources Outcome: Progressing Towards Goal 
Goal: *Understands and describes signs and symptoms to report to providers(Stroke Metric) Outcome: Progressing Towards Goal 
Goal: *Describes/verbalizes understanding of follow-up/return appt Description: (eg: to physicians, diabetes treatment coordinator, and other resources Outcome: Progressing Towards Goal 
 Goal: *Describes importance of continuing daily weights and changes to report to physician Outcome: Progressing Towards Goal

## 2020-11-16 NOTE — PROGRESS NOTES
[facility] Progress Note REFERRING PROVIDER: Dr Jannis Mcburney Subjective:  
  
Modesto Bustos is a 78 y.o. patient who is seen for evaluation/management of VT & mexiletine intolerance. She was readmitted on 11/09/2020 after a fall, suffered leg wound bleeding. She had been discharged on Levaquin, still being treated for leg cellulitis (Morganella morganii), has renal failure, advanced systolic CHF, & anemia She had started mexiletine on 11/02/2020 during a recent admission for control of recurrent VT. Her daughter then reported that she was unsteady, fatigued, nauseated, & shaky with Levaquin & mexiletine. Antibiotic changed to cefepime IV. Stopped mexiletine this admission, started ranolazine 500 mg po bid on 11/11/2020. She did have subsequent run NSVT (8-15 beats, very wide complex, rate 150 bpm). 
  
RV & LV failure, is s/p LVAD in 2017 at [facility].  NYHA III chronic systolic CHF. On appropriate GDMT. 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 
  
Air[employer] dual chamber AICD had been checked earlier this month, showed proper function. Pacing rate 70 bpm to suppress PVCs. No recurrent VT over the last couple days. ECG on 11/15/2020 showed QTc 516 ms, but QRS approx 200 ms with AV pacing. Anticoagulated with warfarin, but recommendation of general surgery to hold to allow for leg wound healing. [facility] has lowered target INR to 1.8-2.5 (2.2 today). 
 
   
Previous: 
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 Echo (11/02/2020): LVEF 15-20%, LVAD in place. Mildly dilated RV with mildly reduced RVEF. Mildly dilated LA. Aortic valve sclerosis without stenosis. 
  
Treated for abscess of sternal wound since late 2019. 
  
LHC (11/09/2018): 2 vessel disease with 90% OM, 80% PDA, DSA to PDA branch 
  
S/p PCI approx 2017.   Reports history of Plavix resistance. 
  
 [facility] (2016): High grade ramus & small PDA disease, borderline disease of LAD & takeoff of pRCA. 
  
Previously followed by [facility], now sees Dr. Tommy Kelly. 
  
Mother with CAD,  age 76. Father  age [de-identified], CAD & MI.  Brother with valve replacement. 
  
 
Patient Active Problem List  
Diagnosis Code  Obesity, morbid ([facility] E66.01  
 Acute on chronic systolic CHF (congestive heart failure) ([facility]) I50.23  
 NICM (nonischemic cardiomyopathy) ([facility] I42.8  Coronary artery disease involving native coronary artery of native heart without angina pectoris I25.10  
 JED on CPAP G47.33, Z99.89  Chronic venous insufficiency I87.2  Ulcer of right foot, limited to breakdown of skin ([facility]) L97.521  
 NSVT (nonsustained ventricular tachycardia) ([facility]) I47.2  Anemia D64.9  ROCIO (acute kidney injury) ([facility] N17.9  Coagulopathy [facility] D68.9  
 Open wound of left lower leg S81.802A Current Facility-Administered Medications Medication Dose Route Frequency Provider Last Rate Last Dose  warfarin (COUMADIN) tablet 4 mg  4 mg Oral ONCE Charity Collins MD      
 Warfarin - Pharmacy Dosing   Other Rx Dosing/Monitoring Charity Collins MD      
 [facility]) tablet 1 mg  1 mg Oral DAILY Charity Collins MD   1 mg at 20 8931  PARoxetine (PAXIL) tablet 20 mg  20 mg Oral DAILY Albert Akhtar, DO   20 mg at 20 0829  
 albuterol-ipratropium (DUO-NEB) 2.5 MG-0.5 MG/3 ML  3 mL Nebulization Q6H PRN Eliana Hernandez MD   3 mL at 20 1549  cephALEXin (KEFLEX) capsule 250 mg  250 mg Oral BID Meryle Sluder, MD   250 mg at 20 5441  
 ranolazine ER (RANEXA) tablet 1,000 mg  1,000 mg Oral BID Vasiliy Karimi MD   1,000 mg at 20 5238  ferrous sulfate tablet 325 mg  325 mg Oral DAILY WITH BREAKFAST Meche Ventura DO   325 mg at 20 0701  
 docusate sodium (COLACE) capsule 100 mg  100 mg Oral BID Cally KATE DO   100 mg at 20 7358  hydrOXYzine HCL (ATARAX) tablet 10 mg  10 mg Oral TID PRN Maradan Palacios M, DO      
 glucose chewable tablet 16 g  4 Tab Oral PRN Sharona Palacios M, DO      
 glucagon (GLUCAGEN) injection 1 mg  1 mg IntraMUSCular PRN Shaorna Palacios M, DO      
 dextrose 10% infusion 0-250 mL  0-250 mL IntraVENous PRN Sharona Palacios M, DO      
 insulin lispro (HUMALOG) injection   SubCUTAneous AC&HS Sharona Palacios M, DO   Stopped at 11/15/20 1630  
 sodium chloride (NS) flush 5-40 mL  5-40 mL IntraVENous Q8H Eleni Webb MD   10 mL at 11/16/20 1769  sodium chloride (NS) flush 5-40 mL  5-40 mL IntraVENous PRN Eleni Webb MD      
 acetaminophen (TYLENOL) tablet 650 mg  650 mg Oral Q6H PRN Eleni Webb MD   650 mg at 11/13/20 3493 Or  acetaminophen (TYLENOL) suppository 650 mg  650 mg Rectal Q6H PRN Eleni Webb MD      
 polyethylene glycol [facility]) packet 17 g  17 g Oral DAILY PRN Eleni Webb MD      
 promethazine (PHENERGAN) tablet 12.5 mg  12.5 mg Oral Q6H PRN Eleni Webb MD      
 Or  
 ondansetron [facility]) injection 4 mg  4 mg IntraVENous Q6H PRN Eleni Webb MD      
 
No Known Allergies Past Medical History:  
Diagnosis Date  Asthma  Cancer ([facility] breast  
 Cancer ([facility]   
 endometrial  
 Congestive heart failure, unspecified  CRI (chronic renal insufficiency)  Depression  Diabetes ([facility]  Hypertension Past Surgical History:  
Procedure Laterality Date  HX HERNIA REPAIR    
 HX HYSTERECTOMY  HX MASTECTOMY See HPI for family history. Social History Tobacco Use  Smoking status: Never Smoker  Smokeless tobacco: Never Used Substance Use Topics  Alcohol use: Not Currently Review of Systems: All other review of systems otherwise negative. Constitutional: Negative for fever, chills, weight loss, + malaise/fatigue. HEENT: Negative for nosebleeds, vision changes. Respiratory: Negative for cough, hemoptysis Cardiovascular: Negative for chest pain, palpitations, orthopnea, claudication, + leg swelling, no syncope, and PND. Gastrointestinal: Negative for nausea, vomiting, diarrhea, blood in stool and melena. Genitourinary: Negative for dysuria, and hematuria. Musculoskeletal: Negative for myalgias, + arthralgia. + involuntary muscle jerking Skin: Negative for rash. + bilat leg wounds, + sternal wound. Heme: + bleed or bruise easily. Neurological: Negative for speech change and + general weakness Objective: LVAD Visit Vitals Pulse 79 Temp 98.6 °F (37 °C) Resp 20 Wt 287 lb (130.2 kg) SpO2 94% BMI 52.49 kg/m² Physical Exam:  
Constitutional: Well-developed and well-nourished. No respiratory distress. Head: Normocephalic and atraumatic. Eyes: Pupils are equal, round ENT: Hearing grossly normal 
Neck: Supple. No JVD present. Cardiovascular: LVAD sounds. Pulmonary/Chest: Effort normal and breath sounds normal. No wheezes. Abdominal: Morbidly obese. Musculoskeletal: Moves extremities independently. Neurological: Alert,oriented. Skin: Skin is warm and dry. Dressings to leg wounds are clean & dry. Psychiatric: Normal mood and affect. Behavior is normal. Judgment and thought content normal.   
 
CMP:  
Lab Results Component Value Date/Time  11/16/2020 04:59 AM  
 K 5.2 (H) 11/16/2020 04:59 AM  
  (H) 11/16/2020 04:59 AM  
 CO2 26 11/16/2020 04:59 AM  
 AGAP 4 (L) 11/16/2020 04:59 AM  
  (H) 11/16/2020 04:59 AM  
 BUN 55 (H) 11/16/2020 04:59 AM  
 CREA 2.67 (H) 11/16/2020 04:59 AM  
 GFRAA 22 (L) 11/16/2020 04:59 AM  
 GFRNA 18 (L) 11/16/2020 04:59 AM  
 CA 8.9 11/16/2020 04:59 AM  
 MG 2.2 11/16/2020 04:58 AM  
 ALB 3.1 (L) 11/16/2020 04:59 AM  
 TP 7.5 11/16/2020 04:59 AM  
 GLOB 4.4 (H) 11/16/2020 04:59 AM  
 AGRAT 0.7 (L) 11/16/2020 04:59 AM  
 ALT 10 (L) 11/16/2020 04:59 AM  
 
CBC:  
Lab Results Component Value Date/Time WBC 7.7 11/16/2020 04:58 AM  
 HGB 8.6 (L) 11/16/2020 04:58 AM  
 HCT 28.4 (L) 11/16/2020 04:58 AM  
  11/16/2020 04:58 AM  
  
COAGS:  
Lab Results Component Value Date/Time PTP 22.3 (H) 11/16/2020 04:59 AM  
 INR 2.2 (H) 11/16/2020 04:59 AM  
 
 
Assessment/Plan: Ms. Denia Marie has RV & LV failure, is s/p LVAD in 2017 at [facility].   
No ACEi/ARB due to CKD, but otherwise on appropriate GDMT. Nuclear PYP scan indicated amyloid ATTR type-awaiting medical therapy if she can get assistance with drug cost per Dr Nettie Landeros. Previously noted nausea with mexiletine for recurrent VT, now with questionable neurologic side effect (involuntary muscle jerking). Mexiletine has been discontinued. Not a good candidate for amiodarone given RV failure. Ablation is not possible given ongoing infection, LVAD, morbid obesity, severe anemia, & JED. Very wide complex slow VT indicates epicardial type. Though she is now stable enough for procedure, LVAD makes epicardial ablation unfeasible. OK to continue off label use of ranolazine 1000 mg po bid to suppress asymptomatic VT. She does, however, have CKD (Cr 2.67). ECG on 11/15/2020 showed AV pacing with QTc 516 ms, QRS approx 200 ms. Continue to monitor, repeat ECG ordered for today. If QTc is longer than 550 ms with ventricular pacing, would have to reduce ranolazine or discontinue. Coreg has been stopped due to low pressure and RV failure. Continuing warfarin with lower INR target (1.8-2.5 per [facility]) to allow for better wound healing. Anemia improved (Hgb 8.6 today). Right Atrial & right ventricular pacing, she has [employer] dual chamber ICD; recent check showed proper function. 2 years of battery left. 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 DAI Jones [address] [facility] 11/16/20 Addendum from EP attending: I have seen, examined patient, and discussed with nurse practitioner, registered nurse, reviewed, updated note and agree with the assessment and plan I have talked to her this am. Her daughter said she still has leg jerking at times LVAD Exam shows regular rhythm Assessment and Plan: 
RV pacing with QRS widening leading to QTc less than 550 ms. Continue with current dose of ranexa No further significant VT I did not think she had mexiletine induced neuropathy but daughter thought she might have had She had GERD and nausea so mexiletine was stopped I will see her in clinic for ICD check ups Future Appointments Date Time Provider Mello Pacheco 11/19/2020  2:50 PM Meryle Ohm, MD [id number] BS AMB  
2/2/2021  3:20 PM PACEMAKER3, DORIS NAGEL BS AMB  
2/2/2021  4:00 PM MD ROBE Walker BS AMB  
2/3/2021  2:50 PM Haleigh Armendariz MD RDE JEFFRY 332 BS AMB Thank you for involving me in this patient's care and please call with further concerns or questions. Vivian Garcia M.D. Electrophysiology/Cardiology [facility] [place] [address]                               
[phone]

## 2020-11-16 NOTE — PROGRESS NOTES
[facility] VAD/Heart Failure Progress Note Admit Date: 2020 POD:  * No surgery found * Procedure:      
 
 Subjective:  
Feels better;less tremors; Abx's Objective:  
Vitals: 
Pulse 83, temperature 97.6 °F (36.4 °C), resp. rate 20, weight 287 lb (130.2 kg), SpO2 94 %. Temp (24hrs), Av.5 °F (36.9 °C), Min:97.6 °F (36.4 °C), Max:99.4 °F (37.4 °C) Hemodynamics: 
 CO:   
 CI:   
 CVP:   
 SVR:   
 PAP Systolic:   
 PAP Diastolic:   
 PVR:   
 FB05:   
 SCV02:   
 
VAD Interrogation: LVAD (Heartmate) Pump Speed (RPM): 9400 Pump Flow (LPM): 5.1 PI (Pulsitility Index): 6.5 Power: 5.7 MAP: 88  Test: No 
Back Up  at Bedside & Labeled: Yes Power Module Test: No 
Driveline Site Care: No 
Driveline Dressing: Clean, Dry, and Intact EKG/Rhythm:   
 
Extubation Date / Time:  
 
CT Output:  
 
Ventilator: 
  
 
Oxygen Therapy: 
Oxygen Therapy O2 Sat (%): 94 % (20 1532) Pulse via Oximetry: 81 beats per minute (20 1550) O2 Device: Room air (20 1112) O2 Flow Rate (L/min): 2 l/min (20 0848) CXR: 
 
Admission Weight: Last Weight Weight: 297 lb 8 oz (134.9 kg) Weight: 287 lb (130.2 kg) Intake / Denice Stovall / Drain: 
Current Shift:  0701 -  1900 In: 316 [P.O.:316] Out: 300 [Urine:300] Last 24 hrs.:  
 
Intake/Output Summary (Last 24 hours) at 2020 1750 Last data filed at 2020 1112 Gross per 24 hour Intake 316 ml Output 900 ml Net -584 ml No results for input(s): CPK, CKMB, TROIQ in the last 72 hours. Recent Labs 20 
5831 20 
0458 11/15/20 
0335 20 
1450   --  141 142  
K 5.2*  --  4.9 5.1 CO2 26  --  29 25 BUN 55*  --  59* 69* CREA 2.67*  --  2.50* 2.69* *  --  127* 130* MG  --  2.2 2.4 2.7* WBC  --  7.7 5.4 5.5 HGB  --  8.6* 8.3* 8.2* HCT  --  28.4* 27.4* 26.4*  
PLT  --  174 172 164 Recent Labs 20 
0459 11/15/20 7920 11/14/20 
5280 INR 2.2* 2.1* 2.0*  
PTP 22.3* 20.8* 20.0* No lab exists for component: PBNP Current Facility-Administered Medications:  
  Warfarin - Pharmacy Dosing, , Other, Rx Dosing/Monitoring, Kaden Painting MD 
  bumetanide (BUMEX) tablet 1 mg, 1 mg, Oral, DAILY, Kaden Painting MD, 1 mg at 11/16/20 7854   PARoxetine (PAXIL) tablet 20 mg, 20 mg, Oral, DAILY, Albert Akhtar K, DO, 20 mg at 11/16/20 0829 
  albuterol-ipratropium (DUO-NEB) 2.5 MG-0.5 MG/3 ML, 3 mL, Nebulization, Q6H PRN, Daniella Rivera MD, 3 mL at 11/14/20 1549   cephALEXin (KEFLEX) capsule 250 mg, 250 mg, Oral, BID, Marie Reveles MD, 250 mg at 11/16/20 1724 
  ranolazine ER (RANEXA) tablet 1,000 mg, 1,000 mg, Oral, BID, Kirit Guerra MD, 1,000 mg at 11/16/20 5297   ferrous sulfate tablet 325 mg, 325 mg, Oral, DAILY WITH BREAKFAST,  [place] M, DO, 325 mg at 11/16/20 7025 
  docusate sodium (COLACE) capsule 100 mg, 100 mg, Oral, BID,  [place] M, DO, 100 mg at 11/16/20 1724   hydrOXYzine HCL (ATARAX) tablet 10 mg, 10 mg, Oral, TID PRN, Flora Padgettu, [employer] M, DO 
  glucose chewable tablet 16 g, 4 Tab, Oral, PRN, Sherkiera Miu, [employer] M, DO 
  glucagon (GLUCAGEN) injection 1 mg, 1 mg, IntraMUSCular, PRN,  [place] M, DO 
  dextrose 10% infusion 0-250 mL, 0-250 mL, IntraVENous, PRN, Sherlialana Miu, [employer] M, DO 
  insulin lispro (HUMALOG) injection, , SubCUTAneous, AC&HS,  [place] M, DO, 2 Units at 11/16/20 1724 
  sodium chloride (NS) flush 5-40 mL, 5-40 mL, IntraVENous, Q8H, Darwin Maddox MD, 10 mL at 11/16/20 1520 
  sodium chloride (NS) flush 5-40 mL, 5-40 mL, IntraVENous, PRN, Darwin Maddox MD 
  acetaminophen (TYLENOL) tablet 650 mg, 650 mg, Oral, Q6H PRN, 650 mg at 11/13/20 0650 **OR** acetaminophen (TYLENOL) suppository 650 mg, 650 mg, Rectal, Q6H PRN, Darwin Maddox MD 
  polyethylene glycol (MIRALAX) packet 17 g, 17 g, Oral, DAILY PRN, Darwin Maddox MD 
   promethazine (PHENERGAN) tablet 12.5 mg, 12.5 mg, Oral, Q6H PRN **OR** ondansetron (ZOFRAN) injection 4 mg, 4 mg, IntraVENous, Q6H PRN, Ranjan Crook MD 
 
A/P 
  
S/P LVAD - good flows Need for anticoagulation - coumadin Sternal wound infecion - abx's Bite - abx's  
  
Risk of morbidity and mortality - high Medical decision making - high complexity Signed By: Mary Salmeron MD

## 2020-11-16 NOTE — PROGRESS NOTES
Pharmacist Note  Warfarin Dosing Consult provided for this 78 y. o.female to manage warfarin for Atrial Fibrillation; LVAD INR Goal: 1.8-2.5 Home regimen/ tablet size: 4 mg daily Drugs that may increase INR: None Drugs that may decrease INR: None Other current anticoagulants/ drugs that may increase bleeding risk: None Risk factors: Age > 72 Daily INR ordered: YES Recent Labs 11/16/20 
2456 11/16/20 
0458 11/15/20 
0335 11/14/20 
8647 HGB  --  8.6* 8.3* 8.2* INR 2.2*  --  2.1* 2.0* Date               INR                  Dose 11/14       2   4 mg  
11/15              2.1                   4 mg       
11/16              2.2                   4 mg Assessment/ Plan: Will order warfarin 4 mg PO x 1 dose. Pharmacy will continue to monitor daily and adjust therapy as indicated. Please contact the pharmacist at p [phone] or  for outpatient recommendations if needed.

## 2020-11-16 NOTE — WOUND CARE
WOCN Note: Follow-up visit for leg wounds.  
  
Chart shows: 
Admitted for leg avulsion post fall with some superficial debridement done by Dr. Denisha Boogie History of LVAD, DM  
  
Assessment:  
Appropriately conversational; tolerates wound care well. Sitting up in chair with legs elevated Bed: total care with foam mattress, alarm pad, & EHOB/waffle cushion 
  
1. POA, left lower anterior leg = 7.5 x 6 x 0.7 cm  100% moist burgundy of traumatized tissue; non-granular - appears to still be evolving from primary trauma. no bleeding with small clots to wound bed. Immediate periwound skin with darkening that may be lost tissue but will wait for it to declare itself - no extension of this tissue. Irregular/tattered margins.  Rest of periwound with no redness or calor, no edema. Cleaned with saline, applied collagen with silver, covered with Mepitel One and dry bandage. ACE wrap from toes to knee. 
  
2. POA, right lower anterior leg from previous edema per daughter's report = 1 x 0.6 x 0.2 cm  No exudate and base is moist red. Periwound with no redness.  Margins are flat and wound appears ready to resurface with some light pink epithelialization at margin. Foam dressing applied.  
  
Recommendations:   
LLL: clean with saline, apply Collagen dressing (left in room), secure collagen with Mepitel One and then cover with dry gauze. Change every 3 days. RLL: clean with saline, apply petrolatum and foam dressing.  Change every 3 days. Buttocks: protect with foam dressing and change as needed with soiling Turn/reposition approximately every 2 hours and offload heels with pillows at all times in bed. EHOB cushion placed over bed alarm pad. 
  
Transition of Care: Plan to follow as needed while admitted to hospital.  
 
ANGY Frankel, RN, [facility] Certified Wound, Ostomy, Continence Nurse 
office [phone] 
pager [phone] or call  to page

## 2020-11-16 NOTE — PROGRESS NOTES
[address] [facility] [address] [facility] in [place], [place] Inpatient Progress Note Patient name: Marina Figueroa Patient : 1950 Patient MRN: [id number] Attending MD: Ga Wen MD 
Date of service: 20 CHIEF COMPLAINT: 
Cellulitis 
  
24 hr events Feels better today Tremors better this morning Hemodynamics stable No ectopy on ranolazine PLAN: 
Continue current device speed 9400rpm; intolerant to higher speed due to VT Expect increasing intolerance to BB/ACEi/ARB/ARNI due to aTTR; on hold for now Application for patient assistance for tafamidis as OP Intolerant of spironolactone due to hyperkalemia  
Continue Bumex 1mg daily;  Diuretic management per nephrology Coumadin per pharmacy; goal INR lowered to 1.8-2.5 EP cardiology consult appreciate; no ectopy on ranolazine Transfuse to keep HGB>7 Antibiotics per ID Use home CPAP when napping and QHS 
DTC consult appreciated PT/OT Nutrition consult appreciated Neurology consult appreciated; held gabapentin with ROCIO 
GI, urogynecology, and pulmonary consults as OP Case management to assist with discharge planning Palliative care consult today to discuss code status and goals of care, d/w patient and daughter 
  
IMPRESSION: 
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 
C/b cardiogenic shock s/p Impella bridge to LVAD S/p HeartMate 2 LVAD implantation (17 by Dr. Jeo Oates) · C/b delayed extubation due to severe COPD 
· C/b critical illness polyneuropathy · C/b prolonged hospitalization post-LVAD, 55 days · C/b sternal wound infection s/p debridement (by Dr. Kadie Peter) s/p wound vac · C/b sacral ulcer · Would culture positive for Staph aureus, not MRSA · C/b LVAD site drainage, improved S/p CRT-ICD · ICD fired due to electrolyte imbalance () H/o breast cancer () · s/p bilateral mastectomy/chemo and endometrial cancer s/p hysterectomy · Lymphedema of LLE due to cancer treatment Severe COPD with FEV1 50% Depression Atrial fibrillation H/o \"two mini strokes\" S/p fall with hip facture · Right hip hemmiarthroplasty (18) by Dr. Bennett Gaming) · S/p removed hip hardwarare due to pain (4/15/19) COPD severe CKD, stage 3 Hyperkalemia Pulmonary hypertension Cardiac risk factors: · Morbid obesity, Body mass index is 53.63 kg/m². · DM2 insulin dependent · JED on CPAP 
· HL Urinary incontinence, severe · no procedures due to anticoagulation · conservative management with Detrol 4 pills bid Endometrial cancer () HTN 
HL 
aTTR amyloidosis  
Left leg wound following fall Encephalopathy Renal failure  
  
CARDIAC IMAGING: 
Echo (19) LVEF 20-25%, AV opens 1:1, no AR Echo (18) LVEF 10-%, ramps study done, report in [facility] Echo (18) ramp study done, LVEDD 7.1cm LHC (18) 2 vessel disease with 90% OM, 80% PDA, DSA to PDA branch 
TTE (10/27/20) LVEF 15-20% LVIDd 7.26cm RVIDd 2.82cm Tapse 1.14cm 
  
HEMODYNAMICS: 
RHC 11/3 shows CVP 14 mmHg, PA 36/18 mmHg, PCWP 15 mmHg, Sal CI 2.4 
CPEST not done 6MW not done 
  
OTHER IMAGING: 
EGD/C-scope (19) no active bleeding and polyp removed. 
  
INTERVAL HISTORY: 
Doing well Feels great Labs improving 
  
FAMILY HISTORY: 
Mother  76 years, diagnosed with DM, HTN, CAD Father  [de-identified]years old, HTN, CAD, MI 
Positive for DM and heart disease in the family, brother with valve replacement 
  
SOCIAL HISTORY: 
 Never alcohol, never smoked, , lives alone, no illicit drug use, lives in house, ambulatory status indpendedn, children: daughter, occupation retired Lives alone. LVAD INTERROGATION: 
Device interrogated in person Device function normal, normal flow, no events LVAD Pump Speed (RPM): 9400 Pump Flow (LPM): 5.8 MAP: 88 
PI (Pulsitility Index): 5.9 Power: 5.9  Test: No 
Back Up  at Bedside & Labeled: Yes Power Module Test: No 
Driveline Site Care: No 
Driveline Dressing: Clean, Dry, and Intact Outpatient: No 
MAP in Therapeutic Range (Outpatient): Yes Testing Alarms Reviewed: Yes 
Back up SC speed: 9400 Back up Low Speed Limit: 9200 Emergency Equipment with Patient?: Yes Emergency procedures reviewed?: Yes Drive line site inspected?: No 
Drive line intergrity inspected?: Yes Drive line dressing changed?: No 
 
PHYSICAL EXAM: 
Visit Vitals Pulse 83 Temp 98.6 °F (37 °C) Resp 20 Wt 287 lb (130.2 kg) SpO2 94% BMI 52.49 kg/m² General: Patient is well developed, well-nourished in no acute distress, sitting up in chair HEENT: Normocephalic and atraumatic. No scleral icterus. Pupils are equal, round and reactive to light and accomodation. No conjunctival injection. Oropharynx is clear. Neck: Supple. No evidence of thyroid enlargements or lymphadenopathy. JVD: Cannot be appreciated Lungs: Breath sounds are equal and clear bilaterally. No wheezes, rhonchi, or rales. Heart: Regular rate and rhythm with normal S1 and S2. No murmurs, gallops or rubs. +ICD DARRELL chest  
Abdomen: Soft, obese, no mass or tenderness. No organomegaly or hernia. Bowel sounds present. Genitourinary and rectal: deferred Extremities: No cyanosis, clubbing, + edema. Neurologic:  +hand tremors Psychiatric: Awake, alert and oriented x 3. Appropriate mood and affect.  
Skin: Redness, ruddiness and edema, LLE wound s/p fall, skin breakdown RLE. 
  
 
REVIEW OF SYSTEMS: 
 General: Denies fever, night sweats. Ear, nose and throat: Denies difficulty hearing, sinus problems, runny nose, post-nasal drip, ringing in ears, mouth sores, loose teeth, ear pain, nosebleeds, sore throate, facial pain or numbess Cardiovascular: see above in the interval history Respiratory: Denies cough, wheezing, sputum production, hemoptysis. Gastrointestinal: Denies heartburn, constipation, intolerance to certain foods, diarrhea, abdominal pain, nausea, vomiting, difficulty swallowing, blood in stool Kidney and bladder: Denies painful urination, frequent urination, urgency, prostate problems and impotence Musculoskeletal: Denies joint pain, +muscle weakness Skin and hair: Denies change in existing skin lesions, hair loss or increase, breast changes PAST MEDICAL HISTORY: 
Past Medical History:  
Diagnosis Date  Asthma  Cancer ([facility] [address] breast  
 Cancer ([facility] [address]   
 endometrial  
 Congestive heart failure, unspecified  CRI (chronic renal insufficiency)  Depression  [facility]  Hypertension PAST SURGICAL HISTORY: 
Past Surgical History:  
Procedure Laterality Date  HX HERNIA REPAIR    
 HX HYSTERECTOMY  HX MASTECTOMY FAMILY HISTORY: 
History reviewed. No pertinent family history. SOCIAL HISTORY: 
Social History Socioeconomic History  Marital status:  Spouse name: Not on file  Number of children: Not on file  Years of education: Not on file  Highest education level: Not on file Tobacco Use  Smoking status: Never Smoker  Smokeless tobacco: Never Used Substance and Sexual Activity  Alcohol use: Not Currently LABORATORY RESULTS: 
  
Labs Latest Ref Rng & Units 11/16/2020 11/15/2020 11/14/2020 11/13/2020 11/12/2020 11/11/2020 11/11/2020 WBC 3.6 - 11.0 K/uL 7.7 5.4 5.5 6.7 6.5 - 9.6  
RBC 3.80 - 5.20 M/uL 3.09(L) 3.01(L) 2.87(L) 2.80(L) 2.68(L) - 2.68(L) Hemoglobin 11.5 - 16.0 g/dL 8.6(L) 8. 3(L) 8.2(L) 7. 8(L) 7. 4(L) 7. 0(L) 7. 4(L) Hematocrit 35.0 - 47.0 % 28. 4(L) 27. 4(L) 26. 4(L) 25. 1(L) 24. 3(L) 22. 6(L) 23. 9(L) MCV 80.0 - 99.0 FL 91.9 91.0 92.0 89.6 90.7 - 89.2 Platelets 645 - 415 K/uL 174 172 164 136(L) 123(L) - 140(L) Lymphocytes 12 - 49 % - - - - - - - Monocytes 5 - 13 % - - - - - - - Eosinophils 0 - 7 % - - - - - - - Basophils 0 - 1 % - - - - - - - Albumin 3.5 - 5.0 g/dL 3. 1(L) 2. 9(L) 2. 9(L) 2. 8(L) 3.0(L) - -  
Calcium 8.5 - 10.1 MG/DL 8.9 9.1 8.6 8.4(L) 8.7 - -  
Glucose 65 - 100 mg/dL 128(H) 127(H) 130(H) 213(H) 113(H) - -  
BUN 6 - 20 MG/DL 55(H) 59(H) 69(H) 75(H) 96(H) - - Creatinine 0.55 - 1.02 MG/DL 2.67(H) 2.50(H) 2.69(H) 3.08(H) 3.88(H) - - Sodium 136 - 145 mmol/L 140 141 142 139 140 - - Potassium 3.5 - 5.1 mmol/L 5.2(H) 4.9 5.1 4.6 4.6 - -  
TSH 0.450 - 4.500 uIU/mL - - - - - - -  
LDH 81 - 246 U/L 226 217 240 219 266(H) - - Some recent data might be hidden Lab Results Component Value Date/Time TSH 2.980 10/01/2020 12:00 AM  
 
 
ALLERGY: 
No Known Allergies CURRENT MEDICATIONS: 
 
Current Facility-Administered Medications:  
  warfarin (COUMADIN) tablet 4 mg, 4 mg, Oral, ONCE, Gaetano Weston MD 
  Warfarin - Pharmacy Dosing, , Other, Rx Dosing/Monitoring, Gaetano Weston MD 
  [facility]) tablet 1 mg, 1 mg, Oral, DAILY, Gaetano Weston MD, 1 mg at 11/16/20 3581   PARoxetine (PAXIL) tablet 20 mg, 20 mg, Oral, DAILY, Albert Akhtar DO, 20 mg at 11/16/20 0829 
  albuterol-ipratropium (DUO-NEB) 2.5 MG-0.5 MG/3 ML, 3 mL, Nebulization, Q6H PRN, Jenni Torres MD, 3 mL at 11/14/20 1549   cephALEXin (KEFLEX) capsule 250 mg, 250 mg, Oral, BID, Catherine Kwong MD, 250 mg at 11/16/20 1372 
  ranolazine ER (RANEXA) tablet 1,000 mg, 1,000 mg, Oral, BID, Shama Nova MD, 1,000 mg at 11/16/20 4099   ferrous sulfate tablet 325 mg, 325 mg, Oral, DAILY WITH BREAKFAST, Meche Jean M, DO, 325 mg at 11/16/20 8524 
  docusate sodium (COLACE) capsule 100 mg, 100 mg, Oral, BID, Curtistine Righter M, DO, 100 mg at 11/16/20 1669   hydrOXYzine HCL (ATARAX) tablet 10 mg, 10 mg, Oral, TID PRN, Paulla Lemon, [employer] M, DO 
  glucose chewable tablet 16 g, 4 Tab, Oral, PRN, Curtistine Righter M, DO 
  glucagon (GLUCAGEN) injection 1 mg, 1 mg, IntraMUSCular, PRN, Curtistine Righter M, DO 
  dextrose 10% infusion 0-250 mL, 0-250 mL, IntraVENous, PRN, Mesha Lemon, [employer] M, DO 
  insulin lispro (HUMALOG) injection, , SubCUTAneous, AC&HS, Bernis [place], DO, 3 Units at 11/16/20 1158   sodium chloride (NS) flush 5-40 mL, 5-40 mL, IntraVENous, Q8H, Ivanna Wright MD, 10 mL at 11/16/20 9620   sodium chloride (NS) flush 5-40 mL, 5-40 mL, IntraVENous, PRN, Ivanna Wright MD 
  acetaminophen (TYLENOL) tablet 650 mg, 650 mg, Oral, Q6H PRN, 650 mg at 11/13/20 0650 **OR** acetaminophen (TYLENOL) suppository 650 mg, 650 mg, Rectal, Q6H PRN, Ivanna Wright MD 
  polyethylene glycol (MIRALAX) packet 17 g, 17 g, Oral, DAILY PRN, Ivanna Wright MD 
  promethazine (PHENERGAN) tablet 12.5 mg, 12.5 mg, Oral, Q6H PRN **OR** ondansetron (ZOFRAN) injection 4 mg, 4 mg, IntraVENous, Q6H PRN, Ivanna Wright MD 
 
PATIENT CARE TEAM: 
Patient Care Team: 
Oliva Caldera NP as PCP - General (Nurse Practitioner) Thank you for allowing me to participate in this patient's care. TEA [place] [address], [address] Phone: [phone] Fax: [phone]

## 2020-11-16 NOTE — PROGRESS NOTES
Transition of Care Plan RUR 30% Palliative Care Consult Disposition  IPR recommended. Referral sent to [facility] Transportation BLS to [employer]   [facility] Shekhar Ontiveros) phone [phone] Home Health  Open to [facility]-- If patient returns home CM will need resumption order for SN  Wound care and PT Contact  Cherie Jones [phone] Patient having involuntary movements and cognition is not at baseline Note   Prior to admission, patient was livin independently in her own one level home. Had LVAD, trilogy , rollator and and walker. Daughter Chencho Alston is supportive. She lives 2 hours away and had a baby 4 weeks ago. She is willing to hire caretakers for patient when discharged if she returns home. A list of private duty agencies was provided to daughter. Patient has been to  [facility] IPR in the past.  
 
Therapy evaluated and  IPR recommended. [facility] accepts LVAD patient's. CM talked with patient and freedom of choice signed for referral to be sent to [facility]. UPDATE 3 pm 
 
[phone] informing CM that [facility] does accept LAVD patient's but no Trilogy She stated that if  patient can be placed on V 60 Bipap , [facility] would likely  be able accept patient for rehab. Malaika talked with Corey Wayne, with heart failure about this and she is going to let CM know if this would be acceptable

## 2020-11-16 NOTE — PROGRESS NOTES
Problem: Mobility Impaired (Adult and Pediatric) Goal: *Acute Goals and Plan of Care (Insert Text) Description: FUNCTIONAL STATUS PRIOR TO ADMISSION: Patient was modified independent using a rollator for functional mobility. HOME SUPPORT PRIOR TO ADMISSION: The patient lived alone with daughter available to provide assistance. Physical Therapy Goals Initiated 2020 1. Patient will move from supine to sit and sit to supine , scoot up and down, and roll side to side in bed with modified independence within 7 day(s). 2.  Patient will transfer from bed to chair and chair to bed with modified independence using the least restrictive device within 7 day(s). 3.  Patient will perform sit to stand with modified independence within 7 day(s). 4.  Patient will ambulate with modified independence for 150 feet with the least restrictive device within 7 day(s). Outcome: Progressing Towards Goal 
  
PHYSICAL THERAPY TREATMENT Patient: Anisha  (18 y.o. female) Date: 2020 Diagnosis: Anemia [D64.9] ROCIO (acute kidney injury) ([facility] [address] [N17.9] <principal problem not specified> Precautions: Fall Chart, physical therapy assessment, plan of care and goals were reviewed. ASSESSMENT Patient continues with skilled PT services and is progressing towards goals. Received patient sitting up in the chair, LVAD switchover completed during OT session this morning. Noted pt continues to have difficulty with switchover. Patient agreeable to progression to gait training though endorses ambulation was attempted yesterday evening with staff assist and that involuntary jerking movements and legs giving way made walking to the bathroom difficult. Jerking movements were not appreciated during this PT visit.   Patient was able to complete sit<->stand with min assist and ambulate 75 ft with her rollator walker with min assist x1 to 2 with no LE giving way and no involuntary jerking movements. Assist of a second person was for safety d/t reports of legs giving way yesterday evening. Gait was slow with shortened strides, mildly unsteady. Assist was needed to manage the walker, gian with turns. Patient was fatigued, SOB and tachycardic with distance walked. Also note, pt's dtr plans to purchase another rollator for pt. Given patient's ongoing difficulty with LVAD management, her fall risk, decreased activity tolerance, weakness, memory impairment, and gait impairment, she is not safe to return home alone. Continue to recommend inpatient rehab prior to returning home where she lives alone. Acute therapy will continue to follow. Current Level of Function Impacting Discharge (mobility/balance): Min assist x1-2 Other factors to consider for discharge: PLOF indep, fall risk/ history, intermittent involuntary jerking movements, lives alone PLAN : 
Patient continues to benefit from skilled intervention to address the above impairments. Continue treatment per established plan of care. to address goals. Recommendation for discharge: (in order for the patient to meet his/her long term goals) Therapy 3 hours per day 5-7 days per week This discharge recommendation: 
Has been made in collaboration with the attending provider and/or case management IF patient discharges home will need the following DME: patient owns DME required for discharge SUBJECTIVE:  
Patient stated I tried to walk last night. I didn't do so good OBJECTIVE DATA SUMMARY:  
Critical Behavior: 
Neurologic State: Alert Orientation Level: Oriented to person, Oriented to place Cognition: Poor safety awareness, Decreased attention/concentration Safety/Judgement: Decreased awareness of need for safety, Decreased insight into deficits, Decreased awareness of need for assistance Functional Mobility Training: 
Bed Mobility: N/t - pt sitting up in chair Transfers: 
Sit to Stand: Minimum assistance;Assist x2 Stand to Sit: Minimum assistance;Assist x2 Bed to Chair: Minimum assistance;Assist x2; Additional time; Adaptive equipment Cues provided to push up from chair, to control descent, and for appropriate body position in front of the chair before sitting Balance: 
Sitting: Impaired; Without support Sitting - Static: Fair (occasional) Sitting - Dynamic: Fair (occasional)(high guard) Standing: Impaired; Without support Standing - Static: Fair;Constant support Standing - Dynamic : Fair;Constant support Ambulation/Gait Training: 
Distance (ft): 75 Feet (ft) Assistive Device: Walker, rollator;Gait belt Ambulation - Level of Assistance: Minimal assistance;Assist x1(w/ SBA of another for safety d/t h/o tremors) Gait Abnormalities: Decreased step clearance;Trunk sway increased Base of Support: Widened Speed/Ivana: Slow Step Length: Right shortened;Left shortened Instructed in activity pacing taking rest breaks when fatigued. Cues provided not to rush gait speed when fatigued but rather to pace self or take standing rest breaks. Pain Rating: 
Voiced no pain Activity Tolerance:  
Fair and requires rest breaks, SOB w/ activity, tachycardic (up to 130's) during this visit. After treatment patient left in no apparent distress:  
Sitting in chair, Call bell within reach, and Caregiver / family present COMMUNICATION/COLLABORATION:  
The patients plan of care was discussed with: Registered nurse. Nohemy Hooper, PT Time Calculation: 30 mins

## 2020-11-16 NOTE — PROGRESS NOTES
ID Progress Note 2020 Subjective:  
 
Sitting up in the chair, denies any discomfort. Review of Systems: 
         
Symptom Y/N Comments   Symptom Y/N Comments Fever/Chills  n     Chest Pain n      
Poor Appetite       Edema Cough       Abdominal Pain Sputum       Joint Pain y      
SOB/HAIRSTON  n     Pruritis/Rash  y     
Nausea/vomit  n     Tolerating PT/OT Diarrhea  n     Tolerating Diet Constipation  n     Other Could NOT obtain due to:   
 
Objective:  
 
Vitals:  
Visit Vitals Pulse 83 Temp 99.4 °F (37.4 °C) Resp 20 Wt 130.2 kg (287 lb) SpO2 93% BMI 52.49 kg/m² Tmax:  Temp (24hrs), Av.7 °F (37.1 °C), Min:98.4 °F (36.9 °C), Max:99.4 °F (37.4 °C) PHYSICAL EXAM: 
General: Chronically ill appearing. WD, WN. Alert, cooperative, no acute distress EENT:  EOMI. Anicteric sclerae. MMM Resp:  Clear in apex with decreased breath sounds at bases, no wheezing or rales. No accessory muscle use CV:  Regular  rhythm,  1+ edema GI:  Soft, distended, Non tender. +Bowel sounds Neurologic:  Alert and orient x 4 Psych:   Fair insight. Not anxious nor agitated Skin:  No rashes. No jaundice Sternal wound; base of q-tip size, scabbed over, no drainage Right anterior lower extremity wound; incision healed Left anterior low extremity wound; dressing dry and intact Sternal wound; base of q-tip size, scabbed over, no drainage Labs:  
Lab Results Component Value Date/Time WBC 7.7 2020 04:58 AM  
 HGB 8.6 (L) 2020 04:58 AM  
 HCT 28.4 (L) 2020 04:58 AM  
 PLATELET 028  04:58 AM  
 MCV 91.9 2020 04:58 AM  
 
Lab Results Component Value Date/Time  Sodium 140 2020 04:59 AM  
 Potassium 5.2 (H) 2020 04:59 AM  
 Chloride 110 (H) 2020 04:59 AM  
 CO2 26 11/16/2020 04:59 AM  
 Anion gap 4 (L) 11/16/2020 04:59 AM  
 Glucose 128 (H) 11/16/2020 04:59 AM  
 BUN 55 (H) 11/16/2020 04:59 AM  
 Creatinine 2.67 (H) 11/16/2020 04:59 AM  
 BUN/Creatinine ratio 21 (H) 11/16/2020 04:59 AM  
 GFR est AA 22 (L) 11/16/2020 04:59 AM  
 GFR est non-AA 18 (L) 11/16/2020 04:59 AM  
 Calcium 8.9 11/16/2020 04:59 AM  
 Bilirubin, total 0.5 11/16/2020 04:59 AM  
 Alk. phosphatase 71 11/16/2020 04:59 AM  
 Protein, total 7.5 11/16/2020 04:59 AM  
 Albumin 3.1 (L) 11/16/2020 04:59 AM  
 Globulin 4.4 (H) 11/16/2020 04:59 AM  
 A-G Ratio 0.7 (L) 11/16/2020 04:59 AM  
 ALT (SGPT) 10 (L) 11/16/2020 04:59 AM  
 
 CT of ABD/PEL: Minimal subcutaneous fat changes right lateral abdominal wall. This may 
represent benign dependent changes if the patient lays on her right. A mild 
contusion could also give this appearance Assessment and Plan Metabolic encephalopathy (resolved) - Head CT: No acute abnormality identified. Incidentally noted is a 1.1 cm meningioma. Pt is back to herself. Daughter at bedside Sternal wound, lower extremity infection Venous stasis 
- normal WBC, afebrile. Urine cx (-) Blood cx (11/10) no growth so far CXR (-) Debridement done at bedside on 11/10 by Dr. Radha Contreras, may need further surgical debridement later, but not concerned with infection in left anterior lower extremity wound at this time. Continue with IV cefepime while she is hospitalized. No concerns of active infection at this time Please resume keflex upon discharge as suppressive therapy; agree with 250mg twice a day 
  
LVAD/ICD Acute on chronic systolic heart failure - cardiology following, s/p cardiac cath 11/3 
  EF 15-20% 
  
Type II DM with neuropathy - A1C 8.4. continue with insulin therapy 
  Continue with Neurontin 
  
ROCIO on CKD 
- creat 4.6->3.0->2. 6 Avoid nephrotoxic agents, nephrology following ID signing off. Please contact us with any questions or concerns.   
 
Oly Steward NP

## 2020-11-16 NOTE — PROGRESS NOTES
[facility] 
 [address] [facility], [facility] [address] [facility] Phone: [phone]  
  
Nephrology Progress Note 
Carly Herndon     1950     [id number] Date of Admission : 11/9/2020 11/16/20 CC: Follow up for ARF Assessment and Plan ROCIO on CKD 
- Possible ATN from IV volume depletion, blood loss. - No obstruction on imaging, urine eos neg 
- UOP and Cr stable 
- cont current diuretics 
- daily labs 
  
CKD stage III. - baseline around 2 
-Etiology of CKD is likely diabetes hypertension and cardiomyopathy. However she does have a positive PYP testing which raises the possibility of amyloidosis. 
  
Chronic systolic CHF, stage C NYHA class IV 
-Combined ischemic and nonischemic cardiomyopathy 
-S/p HM 2 LVAD implant 4/15/2017 by Dr. Umberto Jimenez at [facility] 
- hx of recurrent VT : off mexiletine 
-Recent acute on chronic RV failure. -Recent sternal wound infection with staph aureus and Morganella. Has LVAD site drainage. 
  
Left Calf laceration Blood loss anemia - per primary team/ surgery Severe COPD. Pulmonary hypertension. Chronic A. fib. History of endometrial answer ATTR amyloidosis Interval History: 
Seen and examined. Cr stable. Stable UOP. Oxygenation stable. No cp, sob, n/v/d reported this AM.  Daughter at bedside. Review of Systems: A comprehensive review of systems was negative except for that written in the HPI. Current Medications:  
Current Facility-Administered Medications Medication Dose Route Frequency  warfarin (COUMADIN) tablet 4 mg  4 mg Oral ONCE  Warfarin - Pharmacy Dosing   Other Rx Dosing/Monitoring  bumetanide (BUMEX) tablet 1 mg  1 mg Oral DAILY  PARoxetine (PAXIL) tablet 20 mg  20 mg Oral DAILY  albuterol-ipratropium (DUO-NEB) 2.5 MG-0.5 MG/3 ML  3 mL Nebulization Q6H PRN  
 cephALEXin (KEFLEX) capsule 250 mg  250 mg Oral BID  ranolazine ER (RANEXA) tablet 1,000 mg  1,000 mg Oral BID  
  ferrous sulfate tablet 325 mg  325 mg Oral DAILY WITH BREAKFAST  docusate sodium (COLACE) capsule 100 mg  100 mg Oral BID  hydrOXYzine HCL (ATARAX) tablet 10 mg  10 mg Oral TID PRN  
 glucose chewable tablet 16 g  4 Tab Oral PRN  
 glucagon (GLUCAGEN) injection 1 mg  1 mg IntraMUSCular PRN  
 dextrose 10% infusion 0-250 mL  0-250 mL IntraVENous PRN  
 insulin lispro (HUMALOG) injection   SubCUTAneous AC&HS  sodium chloride (NS) flush 5-40 mL  5-40 mL IntraVENous Q8H  
 sodium chloride (NS) flush 5-40 mL  5-40 mL IntraVENous PRN  
 acetaminophen (TYLENOL) tablet 650 mg  650 mg Oral Q6H PRN Or  
 acetaminophen (TYLENOL) suppository 650 mg  650 mg Rectal Q6H PRN  polyethylene glycol (MIRALAX) packet 17 g  17 g Oral DAILY PRN  promethazine (PHENERGAN) tablet 12.5 mg  12.5 mg Oral Q6H PRN Or  
 ondansetron (ZOFRAN) injection 4 mg  4 mg IntraVENous Q6H PRN No Known Allergies Objective: 
Vitals:   
Vitals:  
 11/16/20 0475 11/16/20 0430 11/16/20 0746 11/16/20 8194 Pulse: 80 83 80 79 Resp: 18 20  20 Temp: 98.4 °F (36.9 °C) 99.4 °F (37.4 °C)  98.6 °F (37 °C) SpO2: 99% 93%  94% Weight:  130.2 kg (287 lb) Intake and Output: 
11/16 0701 - 11/16 1900 In: 18 [P.O.:216] Out: 200 [Urine:200] 11/14 1901 - 11/16 0700 In: 100 [P.O.:100] Out: 2450 [Urine:2450] Physical Examination: 
General: Morbidly obese, in NAD HEENT: PERRL, EOMI Neck: Supple,no mass palpable Lungs : CTA b/l CVS: RRR, VAD sounds Abdomen: Soft, NT, BS +, obese Extremities: 1+ b/l LE edema Skin: No rash or lesions. MS: No joint swelling, erythema, warmth Neurologic: awake and alert [x]    High complexity decision making was performed 
[x]    Patient is at high-risk of decompensation with multiple organ involvement Lab Data Personally Reviewed: I have reviewed all the pertinent labs, microbiology data and radiology studies during assessment. Recent Labs 11/16/20 
0459 11/16/20 8683 11/15/20 
0335 11/14/20 
6858   --  141 142  
K 5.2*  --  4.9 5.1 *  --  110* 110* CO2 26  --  29 25 *  --  127* 130* BUN 55*  --  59* 69* CREA 2.67*  --  2.50* 2.69* CA 8.9  --  9.1 8.6 MG  --  2.2 2.4 2.7* ALB 3.1*  --  2.9* 2.9* ALT 10*  --  11* 12 INR 2.2*  --  2.1* 2.0* Recent Labs 11/16/20 
2028 11/15/20 
0335 11/14/20 
3537 WBC 7.7 5.4 5.5 HGB 8.6* 8.3* 8.2* HCT 28.4* 27.4* 26.4*  
 172 164 No results found for: SDES Lab Results Component Value Date/Time Culture result: NO GROWTH 5 DAYS 11/10/2020 06:42 PM  
 Culture result: No Growth (<1000 cfu/mL) 11/09/2020 12:05 AM  
 Culture result: SCANT Morganella morganii ssp morganii (A) 10/30/2020 11:30 AM  
 Culture result: SCANT Morganella morganii ssp morganii (A) 10/30/2020 11:30 AM  
 Culture result: RARE DIPHTHEROIDS (A) 10/30/2020 11:30 AM  
 
Recent Results (from the past 24 hour(s)) GLUCOSE, POC Collection Time: 11/15/20 11:32 AM  
Result Value Ref Range Glucose (POC) 151 (H) 65 - 100 mg/dL Performed by Prashant JOHNSON GLUCOSE, POC Collection Time: 11/15/20  4:34 PM  
Result Value Ref Range Glucose (POC) 140 (H) 65 - 100 mg/dL Performed by Lisette Hassan GLUCOSE, POC Collection Time: 11/15/20 10:21 PM  
Result Value Ref Range Glucose (POC) 189 (H) 65 - 100 mg/dL Performed by Kenji Hoskins LACTIC ACID Collection Time: 11/16/20  4:58 AM  
Result Value Ref Range Lactic acid 0.5 0.4 - 2.0 MMOL/L  
CBC W/O DIFF Collection Time: 11/16/20  4:58 AM  
Result Value Ref Range WBC 7.7 3.6 - 11.0 K/uL  
 RBC 3.09 (L) 3.80 - 5.20 M/uL HGB 8.6 (L) 11.5 - 16.0 g/dL HCT 28.4 (L) 35.0 - 47.0 % MCV 91.9 80.0 - 99.0 FL  
 MCH 27.8 26.0 - 34.0 PG  
 MCHC 30.3 30.0 - 36.5 g/dL  
 RDW 17.8 (H) 11.5 - 14.5 % PLATELET 552 561 - 346 K/uL MPV 9.7 8.9 - 12.9 FL  
 NRBC 0.0 0  WBC ABSOLUTE NRBC 0.00 0.00 - 0.01 K/uL MAGNESIUM Collection Time: 11/16/20  4:58 AM  
Result Value Ref Range Magnesium 2.2 1.6 - 2.4 mg/dL METABOLIC PANEL, COMPREHENSIVE Collection Time: 11/16/20  4:59 AM  
Result Value Ref Range Sodium 140 136 - 145 mmol/L Potassium 5.2 (H) 3.5 - 5.1 mmol/L Chloride 110 (H) 97 - 108 mmol/L  
 CO2 26 21 - 32 mmol/L Anion gap 4 (L) 5 - 15 mmol/L Glucose 128 (H) 65 - 100 mg/dL BUN 55 (H) 6 - 20 MG/DL Creatinine 2.67 (H) 0.55 - 1.02 MG/DL  
 BUN/Creatinine ratio 21 (H) 12 - 20 GFR est AA 22 (L) >60 ml/min/1.73m2 GFR est non-AA 18 (L) >60 ml/min/1.73m2 Calcium 8.9 8.5 - 10.1 MG/DL Bilirubin, total 0.5 0.2 - 1.0 MG/DL  
 ALT (SGPT) 10 (L) 12 - 78 U/L  
 AST (SGOT) 18 15 - 37 U/L Alk. phosphatase 71 45 - 117 U/L Protein, total 7.5 6.4 - 8.2 g/dL Albumin 3.1 (L) 3.5 - 5.0 g/dL Globulin 4.4 (H) 2.0 - 4.0 g/dL A-G Ratio 0.7 (L) 1.1 - 2.2 LD Collection Time: 11/16/20  4:59 AM  
Result Value Ref Range  81 - 246 U/L  
PROTHROMBIN TIME + INR Collection Time: 11/16/20  4:59 AM  
Result Value Ref Range INR 2.2 (H) 0.9 - 1.1 Prothrombin time 22.3 (H) 9.0 - 11.1 sec NT-PRO BNP Collection Time: 11/16/20  4:59 AM  
Result Value Ref Range NT pro-BNP 16,776 (H) <125 PG/ML  
GLUCOSE, POC Collection Time: 11/16/20  7:15 AM  
Result Value Ref Range Glucose (POC) 139 (H) 65 - 100 mg/dL Performed by Kenji Hoskins Total time spent with patient:  xxx   min. Care Plan discussed with: 
Patient Family RN Consulting Physician [address] [place] [address],      
 
I have reviewed the flowsheets. Chart and Pertinent Notes have been reviewed. No change in PMH ,family and social history from Consult note.  
 
 
Briseyda Griffiths MD

## 2020-11-16 NOTE — PROGRESS NOTES
Admit Date: 2020 POD * No surgery found * Procedure:  * No surgery found * ANTIBIOTICS:  
 
Subjective:  
 
Patient with no significant leg pain. Review of Systems No significant left leg pain. Patient is ambulatory with walker by her report Objective:  
 
Pulse 83, temperature 98.6 °F (37 °C), resp. rate 20, weight 287 lb (130.2 kg), SpO2 94 %. Temp (24hrs), Av.7 °F (37.1 °C), Min:98.4 °F (36.9 °C), Max:99.4 °F (37.4 °C) Physical Exam 
Patient alert awake oriented, daughter present, patient no acute distress, left anterior calf wound visualized prior to wound care dressing change, has normal fibrinous exudate from collagen dressing, some tattered irregular wound edges with some probably partial thickness tissue loss, no evidence of cellulitis ecchymotic peripheral skin Labs:  
Recent Results (from the past 24 hour(s)) GLUCOSE, POC Collection Time: 11/15/20 11:32 AM  
Result Value Ref Range Glucose (POC) 151 (H) 65 - 100 mg/dL Performed by Sarah JOHNSON GLUCOSE, POC Collection Time: 11/15/20  4:34 PM  
Result Value Ref Range Glucose (POC) 140 (H) 65 - 100 mg/dL Performed by David Amado GLUCOSE, POC Collection Time: 11/15/20 10:21 PM  
Result Value Ref Range Glucose (POC) 189 (H) 65 - 100 mg/dL Performed by Leena Jameson LACTIC ACID Collection Time: 20  4:58 AM  
Result Value Ref Range Lactic acid 0.5 0.4 - 2.0 MMOL/L  
CBC W/O DIFF Collection Time: 20  4:58 AM  
Result Value Ref Range WBC 7.7 3.6 - 11.0 K/uL  
 RBC 3.09 (L) 3.80 - 5.20 M/uL HGB 8.6 (L) 11.5 - 16.0 g/dL HCT 28.4 (L) 35.0 - 47.0 % MCV 91.9 80.0 - 99.0 FL  
 MCH 27.8 26.0 - 34.0 PG  
 MCHC 30.3 30.0 - 36.5 g/dL  
 RDW 17.8 (H) 11.5 - 14.5 % PLATELET 011 605 - 910 K/uL MPV 9.7 8.9 - 12.9 FL  
 NRBC 0.0 0  WBC ABSOLUTE NRBC 0.00 0.00 - 0.01 K/uL MAGNESIUM  Collection Time: 20  4:58 AM  
 Result Value Ref Range Magnesium 2.2 1.6 - 2.4 mg/dL METABOLIC PANEL, COMPREHENSIVE Collection Time: 11/16/20  4:59 AM  
Result Value Ref Range Sodium 140 136 - 145 mmol/L Potassium 5.2 (H) 3.5 - 5.1 mmol/L Chloride 110 (H) 97 - 108 mmol/L  
 CO2 26 21 - 32 mmol/L Anion gap 4 (L) 5 - 15 mmol/L Glucose 128 (H) 65 - 100 mg/dL BUN 55 (H) 6 - 20 MG/DL Creatinine 2.67 (H) 0.55 - 1.02 MG/DL  
 BUN/Creatinine ratio 21 (H) 12 - 20 GFR est AA 22 (L) >60 ml/min/1.73m2 GFR est non-AA 18 (L) >60 ml/min/1.73m2 Calcium 8.9 8.5 - 10.1 MG/DL Bilirubin, total 0.5 0.2 - 1.0 MG/DL  
 ALT (SGPT) 10 (L) 12 - 78 U/L  
 AST (SGOT) 18 15 - 37 U/L Alk. phosphatase 71 45 - 117 U/L Protein, total 7.5 6.4 - 8.2 g/dL Albumin 3.1 (L) 3.5 - 5.0 g/dL Globulin 4.4 (H) 2.0 - 4.0 g/dL A-G Ratio 0.7 (L) 1.1 - 2.2 LD Collection Time: 11/16/20  4:59 AM  
Result Value Ref Range  81 - 246 U/L  
PROTHROMBIN TIME + INR Collection Time: 11/16/20  4:59 AM  
Result Value Ref Range INR 2.2 (H) 0.9 - 1.1 Prothrombin time 22.3 (H) 9.0 - 11.1 sec NT-PRO BNP Collection Time: 11/16/20  4:59 AM  
Result Value Ref Range NT pro-BNP 16,776 (H) <125 PG/ML  
GLUCOSE, POC Collection Time: 11/16/20  7:15 AM  
Result Value Ref Range Glucose (POC) 139 (H) 65 - 100 mg/dL Performed by Kenji Hoskins GLUCOSE, POC Collection Time: 11/16/20 11:17 AM  
Result Value Ref Range Glucose (POC) 200 (H) 65 - 100 mg/dL Performed by [facility] Data Review images and reports reviewed Assessment:  
 
Active Problems: 
  Obesity, morbid ([facility] [address] (10/1/2020) Acute on chronic systolic CHF (congestive heart failure) ([facility] [address] (10/27/2020) NICM (nonischemic cardiomyopathy) ([facility] [address] (11/4/2020) Chronic venous insufficiency (11/4/2020) Anemia (11/9/2020) ROCIO (acute kidney injury) ([facility] [address] (11/9/2020) Coagulopathy ([facility] [address] (11/10/2020) Open wound of left lower leg (11/10/2020) Plan/Recommendations/Medical Decision Making:  
 
Continue present treatment Left leg wound , smaller, overall improving, still some evidence of tissue loss, defer to wound care recommendations as to whether any Santyl should be applied at the wound edges but will likely auto desquamate and auto debride, continue local wound care Rivera Miles MD , [facility]

## 2020-11-17 NOTE — PROGRESS NOTES
Comprehensive Nutrition Assessment Type and Reason for Visit: Initial, Wound, RD nutrition re-screen/LOS Nutrition Recommendations/Plan: 1. Encourage PO with meals 
 - family may bring foods from home 2. Consider appetite stimulant if poor intake remains on issue for several weeks 3. Consider Nepro-cap for wound healing Nutrition Assessment:   
Pt admitted for CHF. PMHx: LVAD, COPD, afib, depression, HTN, DM. Cardiac meds being adjusted. LE wound on admit with wound care following. Plans to follow with renal after d/c for CKD. Borderline high K+ but Phos low - diet liberalized for more options. Pt visited today. Known from recent admit with education provided at that time. Eating very well then but appetite down this admit. Some nausea initially on admit but has since been resolved. Pt attributes poor intake to poor mood/malise this admit since she is missing out on visiting her new grandchild. Hx of depression noted. Paxil started on 11/15. Encouraged focus on good intake to work toward goal of discharge. Importance of protein for wound healing discussed. Pt acknowledging understanding of need to work on intake. Declining supplements/snacks at this time. Malnutrition Assessment: 
Malnutrition Status: At risk for malnutrition (specify) Context:  Acute illness Nutritionally Significant Medications: bumex, colace, iron, paxil, ranexa, coumadin Estimated Daily Nutrient Needs: 
Energy (kcal): 5074-1190(MSJ x 1.2 - 250kcal); Weight Used for Energy Requirements: Current(129kg) Protein (g): 103-129g (0.8-1g/kg); Weight Used for Protein Requirements: Current(129kg) Fluid (ml/day): 1900; Method Used for Fluid Requirements: 1 ml/kcal 
 
Nutrition Related Findings:      
BM: 11/15 Edema: none Wounds:  Skin tears, Open wounds Current Nutrition Therapies:  
Diet: renal 
Supplements: none Meal intake:  
Patient Vitals for the past 168 hrs: 
 % Diet Eaten 11/17/20 0830 50 % 11/16/20 0746 50 % 11/15/20 1514 50 % 11/15/20 0743 0 % 11/14/20 1134 50 % 11/14/20 0824 100 % 11/12/20 0848 100 % 11/11/20 1556 25 % 11/11/20 0802 50 % Anthropometric Measures: 
· Height:  5' 2\" (157.5 cm) · Current Body Wt:  129 kg (284 lb 6.3 oz) · Admission Body Wt:  295 lb 6.7 oz · Usual Body Wt:       
· Ideal Body Wt:   :  258.5 % Wt Readings from Last 10 Encounters:  
11/17/20 129 kg (284 lb 6.3 oz) 11/08/20 90.7 kg (200 lb) 11/07/20 130 kg (286 lb 9.6 oz) 10/27/20 131 kg (288 lb 12.8 oz) 10/27/20 135.6 kg (299 lb) 10/01/20 131.1 kg (289 lb) Nutrition Diagnosis:  
· Inadequate protein-energy intake related to (poor appetite) as evidenced by intake 26-50%(pt reports) Nutrition Interventions:  
Food and/or Nutrient Delivery: Modify current diet Nutrition Education and Counseling: Education initiated Coordination of Nutrition Care: Continue to monitor while inpatient Goals: 
Consumption of at least 75% meals Nutrition Monitoring and Evaluation:  
Behavioral-Environmental Outcomes: Beliefs and attitudes Food/Nutrient Intake Outcomes: Food and nutrient intake Physical Signs/Symptoms Outcomes: Biochemical data, Weight, Skin Discharge Planning: Too soon to determine Raghavendra Samano, RD  [address] , Pager #[phone] or via [employer]

## 2020-11-17 NOTE — PROGRESS NOTES
Pharmacist Note  Warfarin Dosing Consult provided for this 78 y. o.female to manage warfarin for Atrial Fibrillation; LVAD INR Goal: 1.8-2.5 Home regimen/ tablet size: 4 mg daily Drugs that may increase INR: None Drugs that may decrease INR: None Other current anticoagulants/ drugs that may increase bleeding risk: None Risk factors: Age > 72 Daily INR ordered: YES Recent Labs 11/17/20 
0510 11/16/20 
0459 11/16/20 
0458 11/15/20 
0335 HGB 8.1*  --  8.6* 8.3* INR 2.4* 2.2*  --  2.1* Date               INR                  Dose 11/14       2   4 mg  
11/15              2.1                   4 mg       
11/16              2.2                   4 mg      
11/17              2.4                   4 mg Assessment/ Plan: Will order warfarin 4 mg PO x 1 dose. Pharmacy will continue to monitor daily and adjust therapy as indicated. Please contact the pharmacist at x [phone] or  for outpatient recommendations if needed.

## 2020-11-17 NOTE — PROGRESS NOTES
Problem: Falls - Risk of 
Goal: *Absence of Falls Description: Document Shawn Click Fall Risk and appropriate interventions in the flowsheet. Outcome: Progressing Towards Goal 
Note: Fall Risk Interventions: 
Mobility Interventions: Patient to call before getting OOB, Communicate number of staff needed for ambulation/transfer, Utilize walker, cane, or other assistive device, Utilize gait belt for transfers/ambulation Medication Interventions: Teach patient to arise slowly, Patient to call before getting OOB Elimination Interventions: Call light in reach, Toileting schedule/hourly rounds, Stay With Me (per policy), Patient to call for help with toileting needs History of Falls Interventions: Utilize gait belt for transfer/ambulation, Door open when patient unattended, Evaluate medications/consider consulting pharmacy Problem: Patient Education: Go to Patient Education Activity Goal: Patient/Family Education Outcome: Progressing Towards Goal 
  
Problem: Pressure Injury - Risk of 
Goal: *Prevention of pressure injury Description: Document Jaime Scale and appropriate interventions in the flowsheet. Outcome: Progressing Towards Goal 
Note: Pressure Injury Interventions: 
Sensory Interventions: Assess changes in LOC, Float heels, Keep linens dry and wrinkle-free, Maintain/enhance activity level Moisture Interventions: Minimize layers, Moisture barrier, Maintain skin hydration (lotion/cream), Limit adult briefs, Internal/External urinary devices Activity Interventions: Increase time out of bed Mobility Interventions: HOB 30 degrees or less, Float heels, Chair cushion Nutrition Interventions: Document food/fluid/supplement intake Friction and Shear Interventions: HOB 30 degrees or less, Lift sheet, Minimize layers, Apply protective barrier, creams and emollients Problem: Patient Education: Go to Patient Education Activity Goal: Patient/Family Education Outcome: Progressing Towards Goal 
  
Problem: Heart Failure: Day 5 Goal: Off Pathway (Use only if patient is Off Pathway) Outcome: Progressing Towards Goal 
Goal: Activity/Safety Outcome: Progressing Towards Goal 
Goal: Diagnostic Test/Procedures Outcome: Progressing Towards Goal 
Goal: Nutrition/Diet Outcome: Progressing Towards Goal 
Goal: Discharge Planning Outcome: Progressing Towards Goal 
Goal: Medications Outcome: Progressing Towards Goal 
Goal: Respiratory Outcome: Progressing Towards Goal 
Goal: Treatments/Interventions/Procedures Outcome: Progressing Towards Goal 
Goal: Psychosocial 
Outcome: Progressing Towards Goal

## 2020-11-17 NOTE — PROGRESS NOTES
1530: Bedside and Verbal shift change report given to Daiana Mcdonald RN (oncoming nurse) by Nohemi Quezada RN (offgoing nurse). Report included the following information SBAR, Kardex, ED Summary, Procedure Summary, Intake/Output, MAR, Recent Results and Med Rec Status. 0000: Bedside and Verbal shift change report given to Casper Elaine RN (oncoming nurse) by Daiana Mcdonald RN (offgoing nurse). Report included the following information SBAR, Kardex, ED Summary, Procedure Summary, Intake/Output, MAR, Accordion, Recent Results and Med Rec Status.

## 2020-11-17 NOTE — CONSULTS
Palliative Medicine Consult Charlie: [phone]) Patient Name: Lorraine Parnell YOB: 1950 Date of Initial Consult: November 17, 2020 Reason for Consult: Care Decisions Requesting Provider: David Mackey NP Primary Care Physician: Aliza Reynoso NP 
 
 SUMMARY:  
Lorraine Parnell is a 78 y.o. female admitted on 11/9/2020 from home of note was dc from the hospital 2 days prior to readmission. Pt had a fall injuring left leg prior to admission and was seen in the Ed for the wound and it was packed and the pt was released home. Admitting diagnosis: bleeding of left leg wound, anemia (hgb 6.9), ROCIO, tremors, chronic SCHF NYHA IV, NSVT, metabolic encephalopathy, hyperkalemia, coagulopathy. PMH: LVAD (2017), COPD, afib, systolic heart failure, ICM, chronic LVAD infection, recurrent admission, cellulitis of right lower leg,DM2,  
 
Current medical issues leading to Palliative Medicine involvement include: support with care decions given functional decline and recurrent admission after 2 days at home and per CHF protocol. Social: , one daughter, lives at home alone, daughter lives 2 hours away,  Worked at [address] [place] [address] before leaving to care for her ill  PALLIATIVE DIAGNOSES:  
1. Hypoalbuminemia 2. Leg wound 3. Confusion~ resolved 4. ACP 5. Goals of care PLAN:  
1. Pt seen with daughter Levis Meigs at the bedside. Services introduced 2. Pt and daughter have a clear understanding of the patient's condition. They feel changes in medications which lead to side effects contributed to her fall at home 3. We discussed care goals, ACP, code status, questions, concerns 4. Goals: continue current level of care. They are waiting to hear if she qualifies for inpatient rehab 5. Code status: we discussed in detail and full code status affirmed 6. ACP: suggested AMD completion.   Daughter says that they already have mpoa docs completed by attorneys. Document explained in detail. They asked for copies to review before agreeing to complete. I provided them with a blank AMD as requested. 7. Dispo: rehab then home. The daughter's home is an option but not ideal due to stairs and renovations that are needed. Daughter still works and has an infant child. They have already discussed some options of hiring additional care in the home for assistance with meals, bathing, and other needs. 6. Daughter will contact me if the pt decides to complete the AMD 
9. Initial consult note routed to primary continuity provider and/or primary health care team members 10. Communicated plan of care with: Palliative IDT, Milan Garnett Team 
 
 GOALS OF CARE / TREATMENT PREFERENCES:  
 
GOALS OF CARE: 
Patient/Health Care Proxy Stated Goals: Prolong life TREATMENT PREFERENCES:  
Code Status: Full Code Advance Care Planning: 
[x] [facility] Interdisciplinary Team has updated the ACP Navigator with [place] and Patient Capacity Primary Decision Maker: Ani Kwon - Daughter - [phone] Advance Care Planning 11/10/2020 Patient's Healthcare Decision Maker is: - Confirm Advance Directive None Patient Would Like to Complete Advance Directive No  
 
 
Medical Interventions: Full interventions Other Instructions:  
Artificially Administered Nutrition: No feeding tube Other: As far as possible, the palliative care team has discussed with patient / health care proxy about goals of care / treatment preferences for patient. HISTORY:  
 
History obtained from: chart, pt, team, dtr CHIEF COMPLAINT: pt admitted with aforementioned history and issues HPI/SUBJECTIVE: The patient is:  
[x] Verbal and participatory [] Non-participatory due to: No complaints. Says tremors are better Clinical Pain Assessment (nonverbal scale for severity on nonverbal patients):  
 Clinical Pain Assessment Severity: 0 Duration: for how long has pt been experiencing pain (e.g., 2 days, 1 month, years) Frequency: how often pain is an issue (e.g., several times per day, once every few days, constant) FUNCTIONAL ASSESSMENT:  
 
Palliative Performance Scale (PPS): PPS: 60 PSYCHOSOCIAL/SPIRITUAL SCREENING:  
 
Palliative IDT has assessed this patient for cultural preferences / practices and a referral made as appropriate to needs (Cultural Services, Patient Advocacy, Ethics, etc.) Any spiritual / Latter-day concerns: 
[] Yes /  [x] No 
 
Caregiver Burnout: 
[] Yes /  [x] No /  [] No Caregiver Present Anticipatory grief assessment:  
[x] Normal  / [] Maladaptive ESAS Anxiety: Anxiety: 0 
 
ESAS Depression: Depression: 0 REVIEW OF SYSTEMS:  
 
Positive and pertinent negative findings in ROS are noted above in HPI. The following systems were [x] reviewed / [] unable to be reviewed as noted in HPI Other findings are noted below. Systems: constitutional, ears/nose/mouth/throat, respiratory, gastrointestinal, genitourinary, musculoskeletal, integumentary, neurologic, psychiatric, endocrine. Positive findings noted below. Modified ESAS Completed by: provider Fatigue: 0 Drowsiness: 0 Depression: 0 Pain: 0 Anxiety: 0 Nausea: 0 Anorexia: 0 Dyspnea: 0 Stool Occurrence(s): 1 PHYSICAL EXAM:  
 
From RN flowsheet: 
Wt Readings from Last 3 Encounters:  
11/17/20 284 lb 6.3 oz (129 kg) 11/08/20 200 lb (90.7 kg) 11/07/20 286 lb 9.6 oz (130 kg) Pulse 82, temperature 98.1 °F (36.7 °C), resp. rate 17, weight 284 lb 6.3 oz (129 kg), SpO2 96 %. Pain Scale 1: Numeric (0 - 10) Pain Intensity 1: 0 Last bowel movement, if known:  
 
Constitutional: alert, conversant, nad, obese Eyes: pupils equal, anicteric ENMT: no nasal discharge, moist mucous membranes Cardiovascular:  
Respiratory: breathing not labored, symmetric Gastrointestinal: gross Musculoskeletal: no deformity Skin: chronic lvad infection, left leg wound Neurologic: following commands, moving all extremities Psychiatric: full affect, no hallucinations Other: 
 
 
 HISTORY:  
 
Active Problems: 
  Obesity, morbid ([facility] (10/1/2020) Acute on chronic systolic CHF (congestive heart failure) ([facility] (10/27/2020) NICM (nonischemic cardiomyopathy) ([facility] (11/4/2020) Chronic venous insufficiency (11/4/2020) Anemia (11/9/2020) ROCIO (acute kidney injury) ([facility] (11/9/2020) Coagulopathy ([facility] (11/10/2020) Open wound of left lower leg (11/10/2020) Past Medical History:  
Diagnosis Date  Asthma  Cancer ([facility] breast  
 Cancer ([facility]   
 endometrial  
 Congestive heart failure, unspecified  CRI (chronic renal insufficiency)  Depression  Diabetes ([facility]  Hypertension Past Surgical History:  
Procedure Laterality Date  HX HERNIA REPAIR    
 HX HYSTERECTOMY  HX MASTECTOMY History reviewed. No pertinent family history. History reviewed, no pertinent family history. Social History Tobacco Use  Smoking status: Never Smoker  Smokeless tobacco: Never Used Substance Use Topics  Alcohol use: Not Currently No Known Allergies Current Facility-Administered Medications Medication Dose Route Frequency  hydrALAZINE (APRESOLINE) 20 mg/mL injection 10 mg  10 mg IntraVENous Q6H PRN  
 warfarin (COUMADIN) tablet 4 mg  4 mg Oral ONCE  hydrALAZINE (APRESOLINE) tablet 25 mg  25 mg Oral TID  Warfarin - Pharmacy Dosing   Other Rx Dosing/Monitoring  bumetanide (BUMEX) tablet 1 mg  1 mg Oral DAILY  PARoxetine (PAXIL) tablet 20 mg  20 mg Oral DAILY  albuterol-ipratropium (DUO-NEB) 2.5 MG-0.5 MG/3 ML  3 mL Nebulization Q6H PRN  
 cephALEXin (KEFLEX) capsule 250 mg  250 mg Oral BID  ranolazine ER (RANEXA) tablet 1,000 mg  1,000 mg Oral BID  
  ferrous sulfate tablet 325 mg  325 mg Oral DAILY WITH BREAKFAST  docusate sodium (COLACE) capsule 100 mg  100 mg Oral BID  hydrOXYzine HCL (ATARAX) tablet 10 mg  10 mg Oral TID PRN  
 glucose chewable tablet 16 g  4 Tab Oral PRN  
 glucagon (GLUCAGEN) injection 1 mg  1 mg IntraMUSCular PRN  
 dextrose 10% infusion 0-250 mL  0-250 mL IntraVENous PRN  
 insulin lispro (HUMALOG) injection   SubCUTAneous AC&HS  sodium chloride (NS) flush 5-40 mL  5-40 mL IntraVENous Q8H  
 sodium chloride (NS) flush 5-40 mL  5-40 mL IntraVENous PRN  
 acetaminophen (TYLENOL) tablet 650 mg  650 mg Oral Q6H PRN Or  
 acetaminophen (TYLENOL) suppository 650 mg  650 mg Rectal Q6H PRN  polyethylene glycol (MIRALAX) packet 17 g  17 g Oral DAILY PRN  promethazine (PHENERGAN) tablet 12.5 mg  12.5 mg Oral Q6H PRN Or  
 ondansetron (ZOFRAN) injection 4 mg  4 mg IntraVENous Q6H PRN  
 
 
 
 LAB AND IMAGING FINDINGS:  
 
Lab Results Component Value Date/Time WBC 6.9 11/17/2020 05:10 AM  
 HGB 8.1 (L) 11/17/2020 05:10 AM  
 PLATELET 358 08/31/6693 05:10 AM  
 
Lab Results Component Value Date/Time Sodium 140 11/17/2020 05:15 AM  
 Potassium 4.6 11/17/2020 05:15 AM  
 Chloride 107 11/17/2020 05:15 AM  
 CO2 29 11/17/2020 05:15 AM  
 BUN 50 (H) 11/17/2020 05:15 AM  
 Creatinine 2.51 (H) 11/17/2020 05:15 AM  
 Calcium 8.8 11/17/2020 05:15 AM  
 Magnesium 2.0 11/17/2020 05:15 AM  
 Phosphorus 2.5 (L) 11/17/2020 05:15 AM  
  
Lab Results Component Value Date/Time Alk. phosphatase 71 11/16/2020 04:59 AM  
 Protein, total 7.5 11/16/2020 04:59 AM  
 Albumin 2.9 (L) 11/17/2020 05:15 AM  
 Globulin 4.4 (H) 11/16/2020 04:59 AM  
 
Lab Results Component Value Date/Time INR 2.4 (H) 11/17/2020 05:10 AM  
 Prothrombin time 24.3 (H) 11/17/2020 05:10 AM  
 aPTT 46.2 (H) 11/09/2020 06:19 PM  
  
Lab Results Component Value Date/Time  Iron 25 (L) 11/12/2020 02:46 PM  
 TIBC 197 (L) 11/12/2020 02:46 PM  
 Iron % saturation 13 (L) 11/12/2020 02:46 PM  
 Ferritin 431 (H) 11/12/2020 05:44 AM  
  
No results found for: PH, PCO2, PO2 No components found for: [place] Lab Results Component Value Date/Time CK 70 11/04/2020 05:49 AM  
  
 
 
   
 
Total time:  70 min Counseling / coordination time, spent as noted above: 60 min 
> 50% counseling / coordination?: y 
 
Prolonged service was provided for  []30 min   []75 min in face to face time in the presence of the patient, spent as noted above. Time Start:  
Time End:  
Note: this can only be billed with 29699 (initial) or 50398 (follow up). If multiple start / stop times, list each separately.

## 2020-11-17 NOTE — PROGRESS NOTES
[facility] 
 [address] [address] [facility] Phone: [phone]  
  
Nephrology Progress Note 
Minal Joaquin     1950     [id number] Date of Admission : 11/9/2020 11/17/20 CC: Follow up for ARF Assessment and Plan ROCIO on CKD 
- resolving ATN  
- continue Bumex 1 mg daily on d/c - I will see her in f/u in 1 week as telehealth visit. She needs to continue w/ weekly labs through home health - we can transition her to our CKD clinic in [address] [place] [address] area once she is deemed stable   
  
CKD stage III. - baseline around 2 
-Etiology of CKD is likely diabetes hypertension and cardiomyopathy. However she does have a positive PYP testing which raises the possibility of amyloidosis. 
  
Chronic systolic CHF, stage C NYHA class IV 
-Combined ischemic and nonischemic cardiomyopathy 
-S/p HM 2 LVAD implant 4/15/2017 by Dr. Mariano Clayton at [facility] 
- hx of recurrent VT : off mexiletine 
-Recent acute on chronic RV failure. -Recent sternal wound infection with staph aureus and Morganella. Has LVAD site drainage. 
  
Left Calf laceration Blood loss anemia - per primary team/ surgery Severe COPD. Pulmonary hypertension. Chronic A. fib. History of endometrial answer ATTR amyloidosis Interval History: 
Seen and examined. Cr stable. Stable UOP. Oxygenation stable. No cp, sob, n/v/d reported this AM.  Daughter at bedside. Jerking movements significantly better today Review of Systems: A comprehensive review of systems was negative except for that written in the HPI. Current Medications:  
Current Facility-Administered Medications Medication Dose Route Frequency  hydrALAZINE (APRESOLINE) 20 mg/mL injection 10 mg  10 mg IntraVENous Q6H PRN  
 warfarin (COUMADIN) tablet 4 mg  4 mg Oral ONCE  hydrALAZINE (APRESOLINE) tablet 25 mg  25 mg Oral TID  Warfarin - Pharmacy Dosing   Other Rx Dosing/Monitoring  bumetanide (BUMEX) tablet 1 mg  1 mg Oral DAILY  PARoxetine (PAXIL) tablet 20 mg  20 mg Oral DAILY  albuterol-ipratropium (DUO-NEB) 2.5 MG-0.5 MG/3 ML  3 mL Nebulization Q6H PRN  
 cephALEXin (KEFLEX) capsule 250 mg  250 mg Oral BID  ranolazine ER (RANEXA) tablet 1,000 mg  1,000 mg Oral BID  ferrous sulfate tablet 325 mg  325 mg Oral DAILY WITH BREAKFAST  docusate sodium (COLACE) capsule 100 mg  100 mg Oral BID  hydrOXYzine HCL (ATARAX) tablet 10 mg  10 mg Oral TID PRN  
 glucose chewable tablet 16 g  4 Tab Oral PRN  
 glucagon (GLUCAGEN) injection 1 mg  1 mg IntraMUSCular PRN  
 dextrose 10% infusion 0-250 mL  0-250 mL IntraVENous PRN  
 insulin lispro (HUMALOG) injection   SubCUTAneous AC&HS  sodium chloride (NS) flush 5-40 mL  5-40 mL IntraVENous Q8H  
 sodium chloride (NS) flush 5-40 mL  5-40 mL IntraVENous PRN  
 acetaminophen (TYLENOL) tablet 650 mg  650 mg Oral Q6H PRN Or  
 acetaminophen (TYLENOL) suppository 650 mg  650 mg Rectal Q6H PRN  polyethylene glycol (MIRALAX) packet 17 g  17 g Oral DAILY PRN  promethazine (PHENERGAN) tablet 12.5 mg  12.5 mg Oral Q6H PRN Or  
 ondansetron (ZOFRAN) injection 4 mg  4 mg IntraVENous Q6H PRN No Known Allergies Objective: 
Vitals:   
Vitals:  
 11/17/20 0340 11/17/20 7273 11/17/20 0659 11/17/20 0830 Pulse: 90 83  82 Resp: 20 18 17 Temp: 96.9 °F (36.1 °C) 98 °F (36.7 °C)  98.1 °F (36.7 °C) SpO2: 96% 96% Weight:   129 kg (284 lb 6.3 oz) Intake and Output: 
11/17 0701 - 11/17 1900 In: -  
Out: 200 [Urine:200] 11/15 1901 - 11/17 0700 In: 796 [P.O.:796] Out: 900 [Urine:900] Physical Examination: 
General: Morbidly obese, in NAD HEENT: PERRL, EOMI Neck: Supple,no mass palpable Lungs : CTA b/l CVS: RRR, VAD sounds Abdomen: Soft, NT, BS +, obese Extremities: 1+ b/l LE edema Skin: No rash or lesions. MS: No joint swelling, erythema, warmth Neurologic: awake and alert [x]    High complexity decision making was performed 
[x]    Patient is at high-risk of decompensation with multiple organ involvement Lab Data Personally Reviewed: I have reviewed all the pertinent labs, microbiology data and radiology studies during assessment. Recent Labs 11/17/20 
0137 11/17/20 
0510 11/16/20 
0459 11/16/20 
0458 11/15/20 
0335   --  140  --  141  
K 4.6  --  5.2*  --  4.9   --  110*  --  110* CO2 29  --  26  --  29 *  --  128*  --  127* BUN 50*  --  55*  --  59* CREA 2.51*  --  2.67*  --  2.50* CA 8.8  --  8.9  --  9.1 MG 2.0  --   --  2.2 2.4 PHOS 2.5*  --   --   --   --   
ALB 2.9*  --  3.1*  --  2.9* ALT  --   --  10*  --  11* INR  --  2.4* 2.2*  --  2.1* Recent Labs 11/17/20 
0510 11/16/20 
0458 11/15/20 
0335 WBC 6.9 7.7 5.4 HGB 8.1* 8.6* 8.3* HCT 26.4* 28.4* 27.4*  
 174 172 No results found for: SDES Lab Results Component Value Date/Time Culture result: NO GROWTH 5 DAYS 11/10/2020 06:42 PM  
 Culture result: No Growth (<1000 cfu/mL) 11/09/2020 12:05 AM  
 Culture result: SCANT Morganella morganii ssp morganii (A) 10/30/2020 11:30 AM  
 Culture result: SCANT Morganella morganii ssp morganii (A) 10/30/2020 11:30 AM  
 Culture result: RARE DIPHTHEROIDS (A) 10/30/2020 11:30 AM  
 
Recent Results (from the past 24 hour(s)) GLUCOSE, POC Collection Time: 11/16/20 11:17 AM  
Result Value Ref Range Glucose (POC) 200 (H) 65 - 100 mg/dL Performed by Viwilliam Randle   
EKG, 12 LEAD, INITIAL Collection Time: 11/16/20 12:03 PM  
Result Value Ref Range Ventricular Rate 78 BPM  
 Atrial Rate 78 BPM  
 P-R Interval 202 ms QRS Duration 170 ms Q-T Interval 446 ms  
 QTC Calculation (Bezet) 508 ms Calculated P Axis 55 degrees Calculated R Axis -67 degrees Calculated T Axis 98 degrees Diagnosis Sinus rhythm with occasional premature ventricular complexes and ventricular  
pacing When compared with ECG of 15-NOV-2020 10:09, 
Ventricular premature beats are now present Confirmed by [facility], MD, Eduarda Yang ([id number]) on 11/16/2020 3:21:02 PM 
  
GLUCOSE, POC Collection Time: 11/16/20  5:02 PM  
Result Value Ref Range Glucose (POC) 189 (H) 65 - 100 mg/dL Performed by Franklin Huerta, POC Collection Time: 11/16/20  9:12 PM  
Result Value Ref Range Glucose (POC) 176 (H) 65 - 100 mg/dL Performed by PORSCHE ALDRICH   
LACTIC ACID Collection Time: 11/17/20  5:10 AM  
Result Value Ref Range Lactic acid 0.3 (L) 0.4 - 2.0 MMOL/L  
CBC W/O DIFF Collection Time: 11/17/20  5:10 AM  
Result Value Ref Range WBC 6.9 3.6 - 11.0 K/uL  
 RBC 2.90 (L) 3.80 - 5.20 M/uL HGB 8.1 (L) 11.5 - 16.0 g/dL HCT 26.4 (L) 35.0 - 47.0 % MCV 91.0 80.0 - 99.0 FL  
 MCH 27.9 26.0 - 34.0 PG  
 MCHC 30.7 30.0 - 36.5 g/dL  
 RDW 17.8 (H) 11.5 - 14.5 % PLATELET 391 132 - 019 K/uL MPV 9.7 8.9 - 12.9 FL  
 NRBC 0.0 0  WBC ABSOLUTE NRBC 0.00 0.00 - 0.01 K/uL PROTHROMBIN TIME + INR Collection Time: 11/17/20  5:10 AM  
Result Value Ref Range INR 2.4 (H) 0.9 - 1.1 Prothrombin time 24.3 (H) 9.0 - 11.1 sec LD Collection Time: 11/17/20  5:15 AM  
Result Value Ref Range  81 - 246 U/L  
MAGNESIUM Collection Time: 11/17/20  5:15 AM  
Result Value Ref Range Magnesium 2.0 1.6 - 2.4 mg/dL NT-PRO BNP Collection Time: 11/17/20  5:15 AM  
Result Value Ref Range NT pro-BNP 10,382 (H) <125 PG/ML  
RENAL FUNCTION PANEL Collection Time: 11/17/20  5:15 AM  
Result Value Ref Range Sodium 140 136 - 145 mmol/L Potassium 4.6 3.5 - 5.1 mmol/L Chloride 107 97 - 108 mmol/L  
 CO2 29 21 - 32 mmol/L Anion gap 4 (L) 5 - 15 mmol/L Glucose 131 (H) 65 - 100 mg/dL BUN 50 (H) 6 - 20 MG/DL Creatinine 2.51 (H) 0.55 - 1.02 MG/DL  
 BUN/Creatinine ratio 20 12 - 20 GFR est AA 23 (L) >60 ml/min/1.73m2 GFR est non-AA 19 (L) >60 ml/min/1.73m2 Calcium 8.8 8.5 - 10.1 MG/DL Phosphorus 2.5 (L) 2.6 - 4.7 MG/DL Albumin 2.9 (L) 3.5 - 5.0 g/dL GLUCOSE, POC Collection Time: 11/17/20  6:47 AM  
Result Value Ref Range Glucose (POC) 137 (H) 65 - 100 mg/dL Performed by Rachell Bryant Total time spent with patient:  xxx   min. Care Plan discussed with: 
Patient Family RN Consulting Physician [facility]     
 
I have reviewed the flowsheets. Chart and Pertinent Notes have been reviewed. No change in PMH ,family and social history from Consult note.  
 
 
Shawn Dubon MD

## 2020-11-17 NOTE — PROGRESS NOTES
2330 Bedside and Verbal shift change report given to Noah Pepe (oncoming nurse) by Radha Nixon RN (offgoing nurse). Report included the following information SBAR, Kardex, MAR and Alarm Parameters . 0025 paged on call MD for MAP of 102. Spoke with Anthony Head, received order for hydralazine prn. 
 
0730 Bedside and Verbal shift change report given to RN (oncoming nurse) by Erik Hensley RN (offgoing nurse). Report included the following information SBAR, Kardex, MAR and Alarm Parameters .

## 2020-11-17 NOTE — PROGRESS NOTES
[address] [facility] Adult  Hospitalist Group Hospitalist Progress Note Rose Platt MD 
Answering service: [phone] OR [phone] from in house phone Date of Service:  2020 NAME:  Miguel Nguyen :  1950 MRN:  [id number] Admission Summary:  
78 y.o. female with past medical history significant for chronic systolic heart failure, ischemic cardiomyopathy with LVAD in place, hypertension, diabetes mellitus type 2 presented emergency room with increased fatigue and fall. Patient was recently admitted to the hospital and discharged on 2020 for similar hospital after being treated for cellulitis of the right lower extremity. Daughter states that since he was discharged patient has been more fatigue and having involuntary movement of her body. During that hospital stay she was started on 2 new medications including Levaquin and mexiletine. Daughter said that yesterday she fell at home injuring her left leg. She has an open wound and to the emergency room. She states that she had wound packed and sent home. Today daughter noticed that the wound started bleeding significantly for which she came to the emergency room. In the emergency room her work-up showed a hemoglobin of 6.9 down from8. As well she was found to have with worsening renal function. Admission was requested for ROCIO and anemia. In the ed wound was significantly bleeding. ED physician stopped bleeding with quick clot and its currently wrapped.  
  
 
Interval history / Subjective:  
Patient seen and examined  Daughter at bedside. She intermittently has action tremors. Some cognitive difficulties  while performing tasks per occupational therapist. 
Carol Primes pain,nausea/vomiting Assessment & Plan:  
 
Acute on chronic renal failure:  
-ATN secondary to hypovolemia due to bleed -nephrology and Advanced heart failure following   
-she is on IV bumex 1 mg  
-renal function stable, potassium high normal 
 
Involuntary movements: 
-Some improvement in tremors but still has difficulty in performing ADLs. Per daughter,she was independent prior to 2 admissions Right leg wound, s/p fall prior to [facility] Acute on chronic anemia: Likely secondary to bleed from leg wound -s/p 3 unit pRBCs 
-no other source of bleeding identified  
-s/p general surgery consult with bedside debridement 11/10 
-Xray negative for fracture Completed course of  cefepime-  switch to keflex  
-Hb stable. Chronic systolic heart failure NYHA class IV, ischemic cardiomyopathy status post LVAD Continue with Coreg 12.5 mg twice daily. On bumex INR stable Not on Entresto ACE inhibitor, ARB is due to CKD 
-Advanced heart failure following NSVT: improved 
-previously on mexiltine but patient reported nausea/GERD 
-EP consulted-continue  Ranexa, Qtc acceptable. Chronic LVAD infection:  
-ID consulted, resumed keflex 
  
Diabetes mellitus type 2: 
Sliding scale insulin before meals and at bedtime,blood glucose controlled 
  
COPD: stable-Continue maintenance inhaled steroids. Bronchodilators as needed. 
  
Depression: stable.  paxil dose decreased Morbid obesity: Body mass index is 46.34 kg/m².  OP management 
 
 Metabolic encephalopathy:resolved 
 
  
Hyperkalemia: resolved, due to ROCIO Code status: full DVT prophylaxis: theurapeutic INR Care Plan discussed with: Patient/Family Anticipated Disposition: rehab Anticipated Discharge: Greater than 48 hours Hospital Problems  Date Reviewed: 10/28/2020 Codes Class Noted POA Coagulopathy ([facility] ICD-10-CM: D69.4 ICD-9-CM: 286.9  11/10/2020 Unknown Open wound of left lower leg ICD-10-CM: S57.028E ICD-9-CM: 891.0  11/10/2020 Unknown Anemia ICD-10-CM: D64.9 ICD-9-CM: 285.9  11/9/2020 Unknown ROCIO (acute kidney injury) ([facility] ICD-10-CM: N17.9 ICD-9-CM: 584.9  11/9/2020 Unknown NICM (nonischemic cardiomyopathy) ([facility] ICD-10-CM: I42.8 ICD-9-CM: 425.4  11/4/2020 Yes Chronic venous insufficiency ICD-10-CM: I87.2 ICD-9-CM: 459.81  11/4/2020 Yes Acute on chronic systolic CHF (congestive heart failure) (HCC) ICD-10-CM: G85.47 ICD-9-CM: 428.23, 428.0  10/27/2020 Yes Obesity, morbid ([facility] ICD-10-CM: E66.01 
ICD-9-CM: 278.01  10/1/2020 Yes Review of Systems:  
Negative unless stated above Vital Signs:  
 Last 24hrs VS reviewed since prior progress note. Most recent are: 
Visit Vitals Pulse 85 Temp 97.9 °F (36.6 °C) Resp 20 Wt 130.2 kg (287 lb) SpO2 95% BMI 52.49 kg/m² Intake/Output Summary (Last 24 hours) at 11/16/2020 2321 Last data filed at 11/16/2020 2219 Gross per 24 hour Intake 796 ml Output 500 ml Net 296 ml Physical Examination:  
 
 
 
     
Constitutional:  no acute distress ENT:  Oral mucosa moist, oropharynx benign. Resp:  CTA bilaterally. No wheezing,no accessory muscle use CV:  LVAD hum, no murmurs, gallops, rubs GI:  Soft, non distended, obese, non tender. Musculoskeletal:  No edema, left leg wrapped, reviewed open wound pictures Neurologic:  alert and oriented X 3,moving all extremities,action tremors + Skin: left leg skin tear, dry dressing on wound Data Review:  
 Review and/or order of clinical lab test 
 
Review and/or order of tests in the medicine section of CPT Labs:  
 
Recent Labs 11/16/20 
0458 11/15/20 
0335 WBC 7.7 5.4 HGB 8.6* 8.3* HCT 28.4* 27.4*  
 172 Recent Labs 11/16/20 
4743 11/16/20 
0458 11/15/20 
0335 11/14/20 
5336   --  141 142  
K 5.2*  --  4.9 5.1 *  --  110* 110* CO2 26  --  29 25 BUN 55*  --  59* 69* CREA 2.67*  --  2.50* 2.69* *  --  127* 130* CA 8.9  --  9.1 8.6 MG  --  2.2 2.4 2.7*  
 
 Recent Labs 11/16/20 
3707 11/15/20 
0335 11/14/20 
0406 ALT 10* 11* 12  
AP 71 70 73 TBILI 0.5 0.4 0.4 TP 7.5 7.2 7.2 ALB 3.1* 2.9* 2.9*  
GLOB 4.4* 4.3* 4.3* Recent Labs 11/16/20 
3873 11/15/20 
0335 11/14/20 
2211 INR 2.2* 2.1* 2.0*  
PTP 22.3* 20.8* 20.0* No results for input(s): FE, TIBC, PSAT, FERR in the last 72 hours. Lab Results Component Value Date/Time Folate 10.0 10/28/2020 03:56 AM  
  
No results for input(s): PH, PCO2, PO2 in the last 72 hours. No results for input(s): CPK, CKNDX, TROIQ in the last 72 hours. No lab exists for component: CPKMB Lab Results Component Value Date/Time Cholesterol, total 192 10/01/2020 12:00 AM  
 HDL Cholesterol 29 (L) 10/01/2020 12:00 AM  
 LDL Chol Calc (NIH) 123 (H) 10/01/2020 12:00 AM  
 Triglyceride 222 (H) 10/01/2020 12:00 AM  
 
Lab Results Component Value Date/Time Glucose (POC) 176 (H) 11/16/2020 09:12 PM  
 Glucose (POC) 189 (H) 11/16/2020 05:02 PM  
 Glucose (POC) 200 (H) 11/16/2020 11:17 AM  
 Glucose (POC) 139 (H) 11/16/2020 07:15 AM  
 Glucose (POC) 189 (H) 11/15/2020 10:21 PM  
 
Lab Results Component Value Date/Time Color Yellow/Straw 11/09/2020 12:05 AM  
 Appearance Turbid (A) 11/09/2020 12:05 AM  
 Specific gravity 1.013 11/09/2020 12:05 AM  
 pH (UA) 5.0 11/09/2020 12:05 AM  
 Protein 100 (A) 11/09/2020 12:05 AM  
 Glucose Negative 11/09/2020 12:05 AM  
 Ketone Negative 11/09/2020 12:05 AM  
 Bilirubin Negative 11/09/2020 12:05 AM  
 Urobilinogen 0.1 11/09/2020 12:05 AM  
 Nitrites Negative 11/09/2020 12:05 AM  
 Leukocyte Esterase Trace (A) 11/09/2020 12:05 AM  
 Bacteria Negative 11/09/2020 12:05 AM  
 WBC 0-5 11/09/2020 12:05 AM  
 RBC 0-5 11/09/2020 12:05 AM  
 
 
 
Medications Reviewed:  
 
Current Facility-Administered Medications Medication Dose Route Frequency  Warfarin - Pharmacy Dosing   Other Rx Dosing/Monitoring  bumetanide (BUMEX) tablet 1 mg  1 mg Oral DAILY  PARoxetine (PAXIL) tablet 20 mg  20 mg Oral DAILY  albuterol-ipratropium (DUO-NEB) 2.5 MG-0.5 MG/3 ML  3 mL Nebulization Q6H PRN  
 cephALEXin (KEFLEX) capsule 250 mg  250 mg Oral BID  ranolazine ER (RANEXA) tablet 1,000 mg  1,000 mg Oral BID  ferrous sulfate tablet 325 mg  325 mg Oral DAILY WITH BREAKFAST  docusate sodium (COLACE) capsule 100 mg  100 mg Oral BID  hydrOXYzine HCL (ATARAX) tablet 10 mg  10 mg Oral TID PRN  
 glucose chewable tablet 16 g  4 Tab Oral PRN  
 glucagon (GLUCAGEN) injection 1 mg  1 mg IntraMUSCular PRN  
 dextrose 10% infusion 0-250 mL  0-250 mL IntraVENous PRN  
 insulin lispro (HUMALOG) injection   SubCUTAneous AC&HS  sodium chloride (NS) flush 5-40 mL  5-40 mL IntraVENous Q8H  
 sodium chloride (NS) flush 5-40 mL  5-40 mL IntraVENous PRN  
 acetaminophen (TYLENOL) tablet 650 mg  650 mg Oral Q6H PRN Or  
 acetaminophen (TYLENOL) suppository 650 mg  650 mg Rectal Q6H PRN  polyethylene glycol (MIRALAX) packet 17 g  17 g Oral DAILY PRN  promethazine (PHENERGAN) tablet 12.5 mg  12.5 mg Oral Q6H PRN Or  
 ondansetron (ZOFRAN) injection 4 mg  4 mg IntraVENous Q6H PRN  
 
______________________________________________________________________ EXPECTED LENGTH OF STAY: 4d 0h 
ACTUAL LENGTH OF STAY:          7 Mikel Mota MD

## 2020-11-17 NOTE — PROGRESS NOTES
Neurology Progress Note Patient ID: 
Lawyer Machuca [id number] 
78 y.o. 
1950 Chief Complaint: 
 
Subjective:  
Aristeo Griffin is a 45-year-old woman who has advanced heart failure with LVAD, diabetes and associated neuropathy, renal dysfunction, hypertension who was admitted to the hospital on November 9 after having a fall with increasing fatigue. Neurology was consulted for involuntary movements of the body. Gabapentin was discontinued and Paxil was decreased to 20 mg daily. Patient reports that involuntary movement has resolved. No other neurologic concerns Objective: All records in [facility] reviewed and noted ROS: 
Per HPI All other 12 pt ROS negative Meds: 
Current Facility-Administered Medications Medication Dose Route Frequency  hydrALAZINE (APRESOLINE) 20 mg/mL injection 10 mg  10 mg IntraVENous Q6H PRN  
 warfarin (COUMADIN) tablet 4 mg  4 mg Oral ONCE  hydrALAZINE (APRESOLINE) tablet 25 mg  25 mg Oral TID  Warfarin - Pharmacy Dosing   Other Rx Dosing/Monitoring  bumetanide (BUMEX) tablet 1 mg  1 mg Oral DAILY  PARoxetine (PAXIL) tablet 20 mg  20 mg Oral DAILY  albuterol-ipratropium (DUO-NEB) 2.5 MG-0.5 MG/3 ML  3 mL Nebulization Q6H PRN  
 cephALEXin (KEFLEX) capsule 250 mg  250 mg Oral BID  ranolazine ER (RANEXA) tablet 1,000 mg  1,000 mg Oral BID  ferrous sulfate tablet 325 mg  325 mg Oral DAILY WITH BREAKFAST  docusate sodium (COLACE) capsule 100 mg  100 mg Oral BID  hydrOXYzine HCL (ATARAX) tablet 10 mg  10 mg Oral TID PRN  
 glucose chewable tablet 16 g  4 Tab Oral PRN  
 glucagon (GLUCAGEN) injection 1 mg  1 mg IntraMUSCular PRN  
 dextrose 10% infusion 0-250 mL  0-250 mL IntraVENous PRN  
 insulin lispro (HUMALOG) injection   SubCUTAneous AC&HS  sodium chloride (NS) flush 5-40 mL  5-40 mL IntraVENous Q8H  
 sodium chloride (NS) flush 5-40 mL  5-40 mL IntraVENous PRN  
 acetaminophen (TYLENOL) tablet 650 mg  650 mg Oral Q6H PRN  
 Or  
 acetaminophen (TYLENOL) suppository 650 mg  650 mg Rectal Q6H PRN  polyethylene glycol (MIRALAX) packet 17 g  17 g Oral DAILY PRN  promethazine (PHENERGAN) tablet 12.5 mg  12.5 mg Oral Q6H PRN Or  
 ondansetron (ZOFRAN) injection 4 mg  4 mg IntraVENous Q6H PRN Imaging: 
 
 
Lab Review Recent Results (from the past 24 hour(s)) EKG, 12 LEAD, INITIAL Collection Time: 11/16/20 12:03 PM  
Result Value Ref Range Ventricular Rate 78 BPM  
 Atrial Rate 78 BPM  
 P-R Interval 202 ms QRS Duration 170 ms Q-T Interval 446 ms  
 QTC Calculation (Bezet) 508 ms Calculated P Axis 55 degrees Calculated R Axis -67 degrees Calculated T Axis 98 degrees Diagnosis Sinus rhythm with occasional premature ventricular complexes and ventricular  
pacing When compared with ECG of 15-NOV-2020 10:09, 
Ventricular premature beats are now present Confirmed by Florentin Booth MD, Mitesh Del Rosario ([id number]) on 11/16/2020 3:21:02 PM 
  
GLUCOSE, POC Collection Time: 11/16/20  5:02 PM  
Result Value Ref Range Glucose (POC) 189 (H) 65 - 100 mg/dL Performed by Daniel Griffith, POC Collection Time: 11/16/20  9:12 PM  
Result Value Ref Range Glucose (POC) 176 (H) 65 - 100 mg/dL Performed by PORSCHE ALDRICH   
LACTIC ACID Collection Time: 11/17/20  5:10 AM  
Result Value Ref Range Lactic acid 0.3 (L) 0.4 - 2.0 MMOL/L  
CBC W/O DIFF Collection Time: 11/17/20  5:10 AM  
Result Value Ref Range WBC 6.9 3.6 - 11.0 K/uL  
 RBC 2.90 (L) 3.80 - 5.20 M/uL HGB 8.1 (L) 11.5 - 16.0 g/dL HCT 26.4 (L) 35.0 - 47.0 % MCV 91.0 80.0 - 99.0 FL  
 MCH 27.9 26.0 - 34.0 PG  
 MCHC 30.7 30.0 - 36.5 g/dL  
 RDW 17.8 (H) 11.5 - 14.5 % PLATELET 819 253 - 762 K/uL MPV 9.7 8.9 - 12.9 FL  
 NRBC 0.0 0  WBC ABSOLUTE NRBC 0.00 0.00 - 0.01 K/uL PROTHROMBIN TIME + INR Collection Time: 11/17/20  5:10 AM  
Result Value Ref Range INR 2.4 (H) 0.9 - 1.1 Prothrombin time 24.3 (H) 9.0 - 11.1 sec LD Collection Time: 11/17/20  5:15 AM  
Result Value Ref Range  81 - 246 U/L  
MAGNESIUM Collection Time: 11/17/20  5:15 AM  
Result Value Ref Range Magnesium 2.0 1.6 - 2.4 mg/dL NT-PRO BNP Collection Time: 11/17/20  5:15 AM  
Result Value Ref Range NT pro-BNP 10,382 (H) <125 PG/ML  
RENAL FUNCTION PANEL Collection Time: 11/17/20  5:15 AM  
Result Value Ref Range Sodium 140 136 - 145 mmol/L Potassium 4.6 3.5 - 5.1 mmol/L Chloride 107 97 - 108 mmol/L  
 CO2 29 21 - 32 mmol/L Anion gap 4 (L) 5 - 15 mmol/L Glucose 131 (H) 65 - 100 mg/dL BUN 50 (H) 6 - 20 MG/DL Creatinine 2.51 (H) 0.55 - 1.02 MG/DL  
 BUN/Creatinine ratio 20 12 - 20 GFR est AA 23 (L) >60 ml/min/1.73m2 GFR est non-AA 19 (L) >60 ml/min/1.73m2 Calcium 8.8 8.5 - 10.1 MG/DL Phosphorus 2.5 (L) 2.6 - 4.7 MG/DL Albumin 2.9 (L) 3.5 - 5.0 g/dL GLUCOSE, POC Collection Time: 11/17/20  6:47 AM  
Result Value Ref Range Glucose (POC) 137 (H) 65 - 100 mg/dL Performed by [facility] Manny Exam: 
Visit Vitals Pulse 82 Temp 98.1 °F (36.7 °C) Resp 17 Wt 129 kg (284 lb 6.3 oz) SpO2 96% BMI 52.02 kg/m² Gen: Well developed CV: RRR Lungs: non labored breathing Abd: non distending Neuro: A&O x 3, no dysarthria or aphasia CN II-XII: PERRL, EOMI, face symmetric, tongue/palate midline Motor: strength 5/5 all four ext Gait: deferred Assessment:  
 
Patient Active Problem List  
Diagnosis Code  Ob[facility], morbid ([facility] E66.01  
 Acute on chronic systolic CHF (congestive heart failure) (HCC) I50.23  
 NICM (nonischemic cardiomyopathy) ([facility] I42.8  Coronary artery disease involving native coronary artery of native heart without angina pectoris I25.10  
 JED on CPAP G47.33, Z99.89  Chronic venous insufficiency I87.2  Ulcer of right foot, limited to breakdown of skin ([facility] L97.525  NSVT (nonsustained ventricular tachycardia) ([facility]) I47.2  Anemia D64.9  ROCIO (acute kidney injury) ([facility] N17.9  Coagulopathy ([facility] D68.9  
 Open wound of left lower leg S81.802A Plan:  
21-year-old woman with multiple chronic medical conditions notably advanced heart failure with involuntary movement which have resolved after gabapentin was discontinue. Involuntary movement was related to gabapentin in the setting of heart failure and renal dysfunction. We discussed continuing Paxil 20 mg daily for now. If there is a need later for an  increased dose, would monitor for tremors. If tremors were to return she would need to decrease the dose again and try another antidepressant, under the care of her PCP. We discuss she should try to void using gabapentin altogether. Thank you very much for this consultation. No further neurologic recommendations at this time. Will sign off but please call with questions.  
 
. 
 
 
Signed: 
Ashley Ramirez NP 
11/17/2020 
11:32 AM

## 2020-11-17 NOTE — PROGRESS NOTES
0730: Bedside shift change report given to [address] Wesley[address] Sutton (oncoming nurse) by Chester Rosas (offgoing nurse). Report included the following information SBAR, Kardex, Intake/Output, MAR, Accordion, Recent Results and Cardiac Rhythm paced. 6449:  this AM. No BP medications scheduled but has prn hydralazine. Peripheral IV infiltrated when attempting to give IV hydralazine. Paged HF.  
 
3506: NP ordered PO hydralazine 10mg for now. To attempt to regain IV access. 0940: Requested PICC team to come by if able to attempt IV access. 1030: MAP 84 upon recheck after PO hydralazine. Will continue to follow up. 
 
1555:  up 1600 VS check. Scheduled PO hydralazine given. 1700: Retook MAP: 110. PRN hydralazine IV given. 1759: Retook MAP: 88.  
 
1930: Bedside shift change report given to [id number] [facility] (oncoming nurse) by Nancy Levy RN (offgoing nurse). Report included the following information SBAR, Kardex, Intake/Output, MAR, Accordion, Recent Results and Cardiac Rhythm paced.

## 2020-11-17 NOTE — PROGRESS NOTES
[address] [facility] [place] [address] [facility] [place] in [place], [place] Inpatient Progress Note Patient name: Baljeet Montejo Patient : 1950 Patient MRN: [id number] Attending MD: Opal Carrera MD 
Date of service: 20 CHIEF COMPLAINT: 
Cellulitis 
  
24 hr events Hypertensive, MAPs 100s Tremors continue to improve No ectopy on ranolazine PLAN: 
Continue current device speed 9400rpm; intolerant to higher speed due to VT Expect increasing intolerance to BB/ACEi/ARB/ARNI due to aTTR; on hold for now Application for patient assistance for tafamidis as OP Start Hydralazine 25mg po TID, maintain MAP 70-90mmHg Intolerant of spironolactone due to hyperkalemia  
Continue Bumex 1mg daily;  Diuretic management per nephrology Coumadin per pharmacy; goal INR lowered to 1.8-2.5 EP cardiology consult appreciate; no ectopy on ranolazine Transfuse to keep HGB>7 Antibiotics per ID Use home Trilogy when napping and QHS 
DTC consult appreciated PT/OT- recommendations for discharge Nutrition consult appreciated Neurology consult appreciated; held gabapentin with ROCIO 
GI, urogynecology, and pulmonary consults as OP Palliative care consult appreciated; patient remains full code, AMD given to patient/daughter to complete Case management to assist with discharge planning Pulmonary consult to assist with discharge planning- patient uses Trilogy for COPD, IPR does not accept Trilogy but will accept V60 BiPAP? Hematology consult; elevated free light chains, evaluation for possible BM bx 
  
IMPRESSION: 
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 
C/b cardiogenic shock s/p Impella bridge to LVAD S/p HeartMate 2 LVAD implantation (4/5/17 by Dr. Serg Bates) · C/b delayed extubation due to severe COPD 
· C/b critical illness polyneuropathy · C/b prolonged hospitalization post-LVAD, 55 days · C/b sternal wound infection s/p debridement (by Dr. Harjeet Amador) s/p wound vac · C/b sacral ulcer · Would culture positive for Staph aureus, not MRSA · C/b LVAD site drainage, improved S/p CRT-ICD · ICD fired due to electrolyte imbalance (2011) H/o breast cancer (1992) · s/p bilateral mastectomy/chemo and endometrial cancer s/p hysterectomy · Lymphedema of LLE due to cancer treatment Severe COPD with FEV1 50% Depression Atrial fibrillation H/o \"two mini strokes\" S/p fall with hip facture · Right hip hemmiarthroplasty (5/23/18) by Dr. Yosvany Ha) · S/p removed hip hardwarare due to pain (4/15/19) COPD severe CKD, stage 3 Hyperkalemia Pulmonary hypertension Cardiac risk factors: · Morbid obesity, Body mass index is 53.63 kg/m². · DM2 insulin dependent · JED on CPAP 
· HL Urinary incontinence, severe · no procedures due to anticoagulation · conservative management with Detrol 4 pills bid Endometrial cancer (2002) HTN 
HL 
aTTR amyloidosis  
Left leg wound following fall Encephalopathy Renal failure  
  
CARDIAC IMAGING: 
Echo (9/24/19) LVEF 20-25%, AV opens 1:1, no AR Echo (5/29/18) LVEF 10-%, ramps study done, report in [facility] Echo (1/9/18) ramp study done, LVEDD 7.1cm LHC (11/9/18) 2 vessel disease with 90% OM, 80% PDA, DSA to PDA branch 
TTE (10/27/20) LVEF 15-20% LVIDd 7.26cm RVIDd 2.82cm Tapse 1.14cm 
  
HEMODYNAMICS: 
RHC 11/3 shows CVP 14 mmHg, PA 36/18 mmHg, PCWP 15 mmHg, Sal CI 2.4 
CPEST not done 6MW not done 
  
OTHER IMAGING: 
EGD/C-scope (4/17/19) no active bleeding and polyp removed. 
  
INTERVAL HISTORY: 
Doing well Feels great Labs improving 
  
FAMILY HISTORY: 
Mother  76 years, diagnosed with DM, HTN, CAD Father  [de-identified]years old, HTN, CAD, MI 
Positive for DM and heart disease in the family, brother with valve replacement 
  
SOCIAL HISTORY: 
Never alcohol, never smoked, , lives alone, no illicit drug use, lives in house, ambulatory status indpendedn, children: daughter, occupation retired Lives alone. LVAD INTERROGATION: 
Device interrogated in person Device function normal, normal flow, no events LVAD Pump Speed (RPM): 9400 Pump Flow (LPM): 5.1 MAP: 100 PI (Pulsitility Index): 6.1 Power: 5.7  Test: Yes 
Back Up  at Bedside & Labeled: Yes Power Module Test: Yes Driveline Site Care: No 
Driveline Dressing: Clean, Dry, and Intact Outpatient: No 
MAP in Therapeutic Range (Outpatient): Yes Testing Alarms Reviewed: Yes 
Back up SC speed: 9400 Back up Low Speed Limit: 9200 Emergency Equipment with Patient?: Yes Emergency procedures reviewed?: Yes Drive line site inspected?: No 
Drive line intergrity inspected?: Yes Drive line dressing changed?: No 
 
PHYSICAL EXAM: 
Visit Vitals Pulse (P) 88 Temp 98.4 °F (36.9 °C) Resp (P) 18 Wt 284 lb 6.3 oz (129 kg) SpO2 (P) 97% BMI 52.02 kg/m² General: Patient is well developed, well-nourished in no acute distress,getting ready to work w/ PT/OT 
HEENT: Normocephalic and atraumatic. No scleral icterus. Pupils are equal, round and reactive to light and accomodation. No conjunctival injection. Oropharynx is clear. Neck: Supple. No evidence of thyroid enlargements or lymphadenopathy. JVD: Cannot be appreciated Lungs: Breath sounds are equal and clear bilaterally. No wheezes, rhonchi, or rales. Heart: Regular rate and rhythm with normal S1 and S2. No murmurs, gallops or rubs. +ICD DARRELL chest  
Abdomen: Soft, obese, no mass or tenderness. No organomegaly or hernia. Bowel sounds present. Genitourinary and rectal: deferred Extremities: No cyanosis, clubbing, + edema. Neurologic:  +hand tremors Psychiatric: Awake, alert and oriented x 3. Appropriate mood and affect. Skin: Redness, ruddiness and edema, LLE wound s/p fall, skin breakdown RLE. 
  
 
REVIEW OF SYSTEMS: 
General: Denies fever, night sweats. Ear, nose and throat: Denies difficulty hearing, sinus problems, runny nose, post-nasal drip, ringing in ears, mouth sores, loose teeth, ear pain, nosebleeds, sore throate, facial pain or numbess Cardiovascular: see above in the interval history Respiratory: Denies cough, wheezing, sputum production, hemoptysis. Gastrointestinal: Denies heartburn, constipation, intolerance to certain foods, diarrhea, abdominal pain, nausea, vomiting, difficulty swallowing, blood in stool Kidney and bladder: Denies painful urination, frequent urination, urgency, prostate problems and impotence Musculoskeletal: Denies joint pain, +muscle weakness Skin and hair: Denies change in existing skin lesions, hair loss or increase, breast changes PAST MEDICAL HISTORY: 
Past Medical History:  
Diagnosis Date  Asthma  Cancer ([facility] [address] breast  
 Cancer ([facility] [address]   
 endometrial  
 Congestive heart failure, unspecified  CRI (chronic renal insufficiency)  Depression  Diabetes ([facility] [address]  Hypertension PAST SURGICAL HISTORY: 
Past Surgical History:  
Procedure Laterality Date  HX HERNIA REPAIR    
 HX HYSTERECTOMY  HX MASTECTOMY FAMILY HISTORY: 
History reviewed. No pertinent family history. SOCIAL HISTORY: 
Social History Socioeconomic History  Marital status:  Spouse name: Not on file  Number of children: Not on file  Years of education: Not on file  Highest education level: Not on file Tobacco Use  Smoking status: Never Smoker  Smokeless tobacco: Never Used Substance and Sexual Activity  Alcohol use: Not Currently LABORATORY RESULTS: 
  
Labs Latest Ref Rng & Units 11/17/2020 11/16/2020 11/15/2020 11/14/2020 11/13/2020 11/12/2020 11/11/2020 WBC 3.6 - 11.0 K/uL 6.9 7.7 5.4 5.5 6.7 6.5 -  
RBC 3.80 - 5.20 M/uL 2.90(L) 3.09(L) 3.01(L) 2.87(L) 2.80(L) 2.68(L) - Hemoglobin 11.5 - 16.0 g/dL 8. 1(L) 8.6(L) 8. 3(L) 8.2(L) 7. 8(L) 7. 4(L) 7. 0(L) Hematocrit 35.0 - 47.0 % 26. 4(L) 28. 4(L) 27. 4(L) 26. 4(L) 25. 1(L) 24. 3(L) 22. 6(L) MCV 80.0 - 99.0 FL 91.0 91.9 91.0 92.0 89.6 90.7 - Platelets 452 - 243 K/uL 162 174 172 164 136(L) 123(L) - Lymphocytes 12 - 49 % - - - - - - - Monocytes 5 - 13 % - - - - - - - Eosinophils 0 - 7 % - - - - - - - Basophils 0 - 1 % - - - - - - - Albumin 3.5 - 5.0 g/dL 2. 9(L) 3. 1(L) 2. 9(L) 2. 9(L) 2. 8(L) 3.0(L) -  
Calcium 8.5 - 10.1 MG/DL 8.8 8.9 9.1 8.6 8.4(L) 8.7 - Glucose 65 - 100 mg/dL 131(H) 128(H) 127(H) 130(H) 213(H) 113(H) -  
BUN 6 - 20 MG/DL 50(H) 55(H) 59(H) 69(H) 75(H) 96(H) -  
Creatinine 0.55 - 1.02 MG/DL 2.51(H) 2.67(H) 2.50(H) 2.69(H) 3.08(H) 3.88(H) - Sodium 136 - 145 mmol/L 140 140 141 142 139 140 - Potassium 3.5 - 5.1 mmol/L 4.6 5.2(H) 4.9 5.1 4.6 4.6 -  
TSH 0.450 - 4.500 uIU/mL - - - - - - -  
LDH 81 - 246 U/L 205 226 217 240 219 266(H) - Some recent data might be hidden Lab Results Component Value Date/Time TSH 2.980 10/01/2020 12:00 AM  
 
 
ALLERGY: 
No Known Allergies CURRENT MEDICATIONS: 
 
Current Facility-Administered Medications:  
  hydrALAZINE (APRESOLINE) 20 mg/mL injection 10 mg, 10 mg, IntraVENous, Q6H PRN, Triny Limon NP, 10 mg at 11/17/20 0221   warfarin (COUMADIN) tablet 4 mg, 4 mg, Oral, ONCE, Arnav Hough MD 
  hydrALAZINE (APRESOLINE) tablet 25 mg, 25 mg, Oral, TID, Sally Torres NP, Stopped at 11/17/20 1100   Warfarin - Pharmacy Dosing, , Other, Rx Dosing/Monitoring, Arnav Hough MD 
  bumetanide (BUMEX) tablet 1 mg, 1 mg, Oral, DAILY, Arnav Hough MD, 1 mg at 11/17/20 0840   PARoxetine (PAXIL) tablet 20 mg, 20 mg, Oral, DAILY, Albert Akhtar, DO, 20 mg at 11/17/20 0840 
  albuterol-ipratropium (DUO-NEB) 2.5 MG-0.5 MG/3 ML, 3 mL, Nebulization, Q6H PRN, Mike Roberson MD, 3 mL at 11/14/20 1549   cephALEXin (KEFLEX) capsule 250 mg, 250 mg, Oral, BID, Noah Spaulding MD, 250 mg at 11/17/20 0840 
  ranolazine ER (RANEXA) tablet 1,000 mg, 1,000 mg, Oral, BID, Nikko Bates MD, 1,000 mg at 11/17/20 8347   ferrous sulfate tablet 325 mg, 325 mg, Oral, DAILY WITH BREAKFAST, Britta KATE DO, 325 mg at 11/17/20 0840 
  docusate sodium (COLACE) capsule 100 mg, 100 mg, Oral, BID, Britta KATE DO, 100 mg at 11/17/20 0749   hydrOXYzine HCL (ATARAX) tablet 10 mg, 10 mg, Oral, TID PRN, Ezequiel [employer] M, DO 
  glucose chewable tablet 16 g, 4 Tab, Oral, PRN, Britta KATE DO 
  glucagon (GLUCAGEN) injection 1 mg, 1 mg, IntraMUSCular, PRN, Britta KATE DO 
  dextrose 10% infusion 0-250 mL, 0-250 mL, IntraVENous, PRN, Ezequiel [employer] M, DO 
  insulin lispro (HUMALOG) injection, , SubCUTAneous, AC&HS, Britta KATE DO, 2 Units at 11/17/20 1218   sodium chloride (NS) flush 5-40 mL, 5-40 mL, IntraVENous, Q8H, Nilson Mina MD, 10 mL at 11/17/20 0647 
  sodium chloride (NS) flush 5-40 mL, 5-40 mL, IntraVENous, PRN, Nilson Mina MD 
  acetaminophen (TYLENOL) tablet 650 mg, 650 mg, Oral, Q6H PRN, 650 mg at 11/13/20 0650 **OR** acetaminophen (TYLENOL) suppository 650 mg, 650 mg, Rectal, Q6H PRN, Nilson Mina MD 
  polyethylene glycol (MIRALAX) packet 17 g, 17 g, Oral, DAILY PRN, Nilson Mina MD 
  promethazine (PHENERGAN) tablet 12.5 mg, 12.5 mg, Oral, Q6H PRN **OR** ondansetron (ZOFRAN) injection 4 mg, 4 mg, IntraVENous, Q6H PRN, Nilson Mina MD 
 
PATIENT CARE TEAM: 
Patient Care Team: 
Claudia Ku NP as PCP - General (Nurse Practitioner) Thank you for allowing me to participate in this patient's care. TEA Guzman [phone] [facility] 
[address] [facility], [address] Phone: [phone] Fax: [phone] [facility] ATTENDING ADDENDUM Patient was seen and examined in person. Data and notes were reviewed. I have discussed and agree with the plan as noted in the NP note above without further additions. Golden Kurtz MD PhD 
Beatriz Villasenor [phone] [facility]

## 2020-11-17 NOTE — CONSULTS
Name: [facility]: [facility]  
: 1950 Admit Date: 2020 Phone:   Room: 45/01 PCP: Amber Leija NP  MRN: [id number] Date: 2020  Code: Full Code HPI: 
 
1:12 PM    
 
History was obtained from patient. I was asked by Anna Quiros MD to see Nancy Childs in consultation for Trelegy. History of Present Illness: 78year old female with past medical history of NICMP (suspect to be amyloid heart disease - ATTR based on Pema PYP scan) s/p LVAD (2017 by Dr Sanna Samano), COPD, afib, chronic LVAD infection admitted with cellulitis of right lower leg. Pulmonary requested for trelegy. Patient has Trelegy machine. Last time seen in the [address] office in [facility]. Does not remember Trelegy settings. Initial Trelegy seems like was ordered by advanced heart failure team at [address] [place] [address] (Dr Mau Vidales). Past Medical History:  
Diagnosis Date  Asthma  Cancer ([address] breast  
 Cancer ([address]   
 endometrial  
 Congestive heart failure, unspecified  CRI (chronic renal insufficiency)  Depression  Diabetes ([address]  Hypertension Past Surgical History:  
Procedure Laterality Date  HX HERNIA REPAIR    
 HX HYSTERECTOMY  HX MASTECTOMY History reviewed. No pertinent family history. Social History Tobacco Use  Smoking status: Never Smoker  Smokeless tobacco: Never Used Substance Use Topics  Alcohol use: Not Currently No Known Allergies Current Facility-Administered Medications Medication Dose Route Frequency  hydrALAZINE (APRESOLINE) 20 mg/mL injection 10 mg  10 mg IntraVENous Q6H PRN  
 warfarin (COUMADIN) tablet 4 mg  4 mg Oral ONCE  hydrALAZINE (APRESOLINE) tablet 25 mg  25 mg Oral TID  Warfarin - Pharmacy Dosing   Other Rx Dosing/Monitoring  bumetanide (BUMEX) tablet 1 mg  1 mg Oral DAILY  PARoxetine (PAXIL) tablet 20 mg  20 mg Oral DAILY  albuterol-ipratropium (DUO-NEB) 2.5 MG-0.5 MG/3 ML  3 mL Nebulization Q6H PRN  
 cephALEXin (KEFLEX) capsule 250 mg  250 mg Oral BID  ranolazine ER (RANEXA) tablet 1,000 mg  1,000 mg Oral BID  ferrous sulfate tablet 325 mg  325 mg Oral DAILY WITH BREAKFAST  docusate sodium (COLACE) capsule 100 mg  100 mg Oral BID  hydrOXYzine HCL (ATARAX) tablet 10 mg  10 mg Oral TID PRN  
 glucose chewable tablet 16 g  4 Tab Oral PRN  
 glucagon (GLUCAGEN) injection 1 mg  1 mg IntraMUSCular PRN  
 dextrose 10% infusion 0-250 mL  0-250 mL IntraVENous PRN  
 insulin lispro (HUMALOG) injection   SubCUTAneous AC&HS  sodium chloride (NS) flush 5-40 mL  5-40 mL IntraVENous Q8H  
 sodium chloride (NS) flush 5-40 mL  5-40 mL IntraVENous PRN  
 acetaminophen (TYLENOL) tablet 650 mg  650 mg Oral Q6H PRN Or  
 acetaminophen (TYLENOL) suppository 650 mg  650 mg Rectal Q6H PRN  polyethylene glycol (MIRALAX) packet 17 g  17 g Oral DAILY PRN  promethazine (PHENERGAN) tablet 12.5 mg  12.5 mg Oral Q6H PRN Or  
 ondansetron (ZOFRAN) injection 4 mg  4 mg IntraVENous Q6H PRN  
 
 
 
REVIEW OF SYSTEMS Negative except as stated in the HPI. Physical Exam:  
Visit Vitals Pulse (P) 88 Temp 98.4 °F (36.9 °C) Resp (P) 18 Wt 129 kg (284 lb 6.3 oz) SpO2 97% BMI 52.02 kg/m² General:  Alert, cooperative, no distress, appears stated age. Head:  Normocephalic, without obvious abnormality, atraumatic. Eyes:  Conjunctivae/corneas clear. PERRL, EOMs intact. Nose: Nares normal. Septum midline. Mucosa normal. No drainage or sinus tenderness. Throat: Lips, mucosa, and tongue normal. Teeth and gums normal.  
Neck: Supple, symmetrical, trachea midline, no adenopathy, thyroid: no enlargment/tenderness/nodules, no carotid bruit and no JVD. Back:   Symmetric, no curvature. ROM normal.  
Lungs:   Clear to auscultation bilaterally. Chest wall:  No tenderness or deformity. Heart:  Regular rate and rhythm, S1, S2 normal, no murmur, click, rub or gallop. Abdomen:   Soft, non-tender. Bowel sounds normal. No masses,  No organomegaly. Extremities: Extremities normal, atraumatic, no cyanosis or edema. Pulses: 2+ and symmetric all extremities. Skin: Skin color, texture, turgor normal. No rashes or lesions Lymph nodes: Cervical, supraclavicular, and axillary nodes normal.  
Neurologic: Grossly nonfocal  
 
 
Lab Results Component Value Date/Time Sodium 140 11/17/2020 05:15 AM  
 Potassium 4.6 11/17/2020 05:15 AM  
 Chloride 107 11/17/2020 05:15 AM  
 CO2 29 11/17/2020 05:15 AM  
 BUN 50 (H) 11/17/2020 05:15 AM  
 Creatinine 2.51 (H) 11/17/2020 05:15 AM  
 Glucose 131 (H) 11/17/2020 05:15 AM  
 Calcium 8.8 11/17/2020 05:15 AM  
 Magnesium 2.0 11/17/2020 05:15 AM  
 Phosphorus 2.5 (L) 11/17/2020 05:15 AM  
 Lactic acid 0.3 (L) 11/17/2020 05:10 AM  
 
 
Lab Results Component Value Date/Time WBC 6.9 11/17/2020 05:10 AM  
 HGB 8.1 (L) 11/17/2020 05:10 AM  
 PLATELET 238 83/69/8299 05:10 AM  
 MCV 91.0 11/17/2020 05:10 AM  
 
 
Lab Results Component Value Date/Time INR 2.4 (H) 11/17/2020 05:10 AM  
 aPTT 46.2 (H) 11/09/2020 06:19 PM  
 Alk. phosphatase 71 11/16/2020 04:59 AM  
 Protein, total 7.5 11/16/2020 04:59 AM  
 Albumin 2.9 (L) 11/17/2020 05:15 AM  
 Globulin 4.4 (H) 11/16/2020 04:59 AM  
 
 
Lab Results Component Value Date/Time Iron 25 (L) 11/12/2020 02:46 PM  
 TIBC 197 (L) 11/12/2020 02:46 PM  
 Iron % saturation 13 (L) 11/12/2020 02:46 PM  
 Ferritin 431 (H) 11/12/2020 05:44 AM  
 
 
Lab Results Component Value Date/Time Sed rate (ESR) 55 (H) 10/01/2020 12:00 AM  
 TSH 2.980 10/01/2020 12:00 AM  
  
 
No results found for: PH, PHI, PCO2, PCO2I, PO2, PO2I, HCO3, HCO3I, FIO2, FIO2I Lab Results Component Value Date/Time CK 70 11/04/2020 05:49 AM  
  
 
Lab Results Component Value Date/Time Culture result: NO GROWTH 5 DAYS 11/10/2020 06:42 PM  
 Culture result: No Growth (<1000 cfu/mL) 11/09/2020 12:05 AM  
 Culture result: SCANT Morganella morganii ssp morganii (A) 10/30/2020 11:30 AM  
 Culture result: SCANT Morganella morganii ssp morganii (A) 10/30/2020 11:30 AM  
 Culture result: RARE DIPHTHEROIDS (A) 10/30/2020 11:30 AM  
 
 
No results found for: TOXA1, RPR, HBCM, HBSAG, HAAB, HCAB1, HAAT, G6PD, CRYAC, HIVGT, HIVR, HIV1, HIV12, HIVPC, HIVRPI Lab Results Component Value Date/Time Vancomycin,trough 26.6 (HH) 10/31/2020 05:18 AM  
 CK 70 11/04/2020 05:49 AM  
  11/02/2020 03:42 AM  
 
 
Lab Results Component Value Date/Time Color Yellow/Straw 11/09/2020 12:05 AM  
 Appearance Turbid (A) 11/09/2020 12:05 AM  
 Specific gravity 1.013 11/09/2020 12:05 AM  
 pH (UA) 5.0 11/09/2020 12:05 AM  
 Protein 100 (A) 11/09/2020 12:05 AM  
 Glucose Negative 11/09/2020 12:05 AM  
 Ketone Negative 11/09/2020 12:05 AM  
 Bilirubin Negative 11/09/2020 12:05 AM  
 Blood Moderate (A) 11/09/2020 12:05 AM  
 Urobilinogen 0.1 11/09/2020 12:05 AM  
 Nitrites Negative 11/09/2020 12:05 AM  
 Leukocyte Esterase Trace (A) 11/09/2020 12:05 AM  
 WBC 0-5 11/09/2020 12:05 AM  
 RBC 0-5 11/09/2020 12:05 AM  
 Bacteria Negative 11/09/2020 12:05 AM  
 
 
IMPRESSION: 
=========== 
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 -Chronic respiratory failure: on trelegy as out patient. 
-Asthma. 
-afib 
-DM2 
-Hx of Breast cancer. PLAN: 
===== 
-Trelegy provided by [facility]. Will get the settings from [facility]. Refer to Trelogy clinic with Dr Sarah Ornelas at [facility] office. Patient lives [place] and would like to follow at [facility]. Thank you for the consult. No objection to discharge from pulmonary stand point.  
 
Matt Avery MD

## 2020-11-17 NOTE — PROGRESS NOTES
Problem: Mobility Impaired (Adult and Pediatric) Goal: *Acute Goals and Plan of Care (Insert Text) Description: FUNCTIONAL STATUS PRIOR TO ADMISSION: Patient was modified independent using a rollator for functional mobility. HOME SUPPORT PRIOR TO ADMISSION: The patient lived alone with daughter available to provide assistance. Physical Therapy Goals Initiated 11/13/2020 1. Patient will move from supine to sit and sit to supine , scoot up and down, and roll side to side in bed with modified independence within 7 day(s). 2.  Patient will transfer from bed to chair and chair to bed with modified independence using the least restrictive device within 7 day(s). 3.  Patient will perform sit to stand with modified independence within 7 day(s). 4.  Patient will ambulate with modified independence for 150 feet with the least restrictive device within 7 day(s). Outcome: Progressing Towards Goal 
 
PHYSICAL THERAPY TREATMENT Patient: Kevin Ryan (12 y.o. female) Date: 11/17/2020 Diagnosis: Anemia [D64.9] ROCIO (acute kidney injury) ([facility] [address] [N17.9] <principal problem not specified> Precautions: Fall Chart, physical therapy assessment, plan of care and goals were reviewed. ASSESSMENT Patient continues with skilled PT services and is progressing towards goals. Pt reports feeling better with less tremors today. Pt required CGA to min A to mobilize. Pt changed over with OT noted required assistance. Pt had difficulties standing requiring several attempts  and an elevated surface. Gait tolerance was limited by increase 's BPM. PTA pt was Mod I with rollator with a fall at home. Pt does have daughter that can assist some. Pt nay benefit from inpt rehab to optimize mobility and independence prior to returning home. Discussed with pt's daughter about rollator. She is aware of poor rollator brakes and needs replacement or to be fixed.  It will be an out of pocket cost due to amount of time of owning rollator Current Level of Function Impacting Discharge (mobility/balance): min A Other factors to consider for discharge: lives alone, LVAD, recent fall, resolving tremors PLAN : 
Patient continues to benefit from skilled intervention to address the above impairments. Continue treatment per established plan of care. to address goals. Recommendation for discharge: (in order for the patient to meet his/her long term goals) Therapy 3 hours per day 5-7 days per week if unable HHPT with assistance This discharge recommendation: 
Has not yet been discussed the attending provider and/or case management IF patient discharges home will need the following DME: new rollator- daughter is addressing SUBJECTIVE:  
Patient stated I am better.  OBJECTIVE DATA SUMMARY:  
Critical Behavior: 
Neurologic State: Alert Orientation Level: Oriented to person, Oriented to place, Oriented to situation, Disoriented to time Cognition: Other (comment), Follows commands(decreased insight into deficits) Safety/Judgement: Decreased insight into deficits Functional Mobility Training: 
Bed Mobility: 
  
Supine to Sit: Contact guard assistance Transfers: 
Sit to Stand: Contact guard assistance(elevated surface, required several attmpts) Stand to Sit: Contact guard assistance(decrease control) Bed to Chair: Assist x1;Additional time; Adaptive equipment;Minimum assistance(using rollator) Balance: 
Sitting: Without support; Intact Sitting - Static: Good (unsupported) Sitting - Dynamic: Good (unsupported) Standing: Impaired; With support Standing - Static: Fair Standing - Dynamic : Fair;Constant support Ambulation/Gait Training: 
Distance (ft): 40 Feet (ft) Assistive Device: Gait belt;Walker, rollator Ambulation - Level of Assistance: Contact guard assistance;Minimal assistance Gait Abnormalities: Decreased step clearance Base of Support: Widened Step Length: Left shortened;Right shortened Stairs: Therapeutic Exercises:  
 
Pain Rating: No complaints Activity Tolerance:  
Limited by HR After treatment patient left in no apparent distress:  
Sitting in chair, Call bell within reach, and Caregiver / family present COMMUNICATION/COLLABORATION:  
The patients plan of care was discussed with: Registered nurse. YENIFER Gaspar, RAINER Time Calculation: 25 mins

## 2020-11-17 NOTE — PROGRESS NOTES
Problem: Self Care Deficits Care Plan (Adult) Goal: *Acute Goals and Plan of Care (Insert Text) Description: FUNCTIONAL STATUS PRIOR TO ADMISSION: Patient was modified independent using a rollator for functional mobility. Patient with recent hospital admission with discharge home however returned s/p GLF with left leg wound. HOME SUPPORT: The patient lived alone with daughter to provide assistance occasionally, however daughter is caring for her baby and is looking into hiring caregivers for patient per  note. Occupational Therapy Goals Initiated 11/13/2020 1. Patient will perform ADLs standing 5 mins without fatigue or LOB with contact guard assistance within 5 day(s). 2.  Patient will perform lower body ADLs with supervision/set-up within 5 day(s) using AE PRN. 3.  Patient will perform sponge bathing x supervision/set-up within 5 day(s). 4.  Patient will perform toilet transfers with contact guard assistance within 5 day(s). 5.  Patient will perform all aspects of toileting with contact guard assistance within 7 day(s). 6.  Patient will complete LVAD switchover from wall <> portable battery pack with independence within 7 day(s) Outcome: Progressing Towards Goal 
 OCCUPATIONAL THERAPY TREATMENT Patient: Deanna Fontenot (88 y.o. female) Date: 11/17/2020 Diagnosis: Anemia [D64.9] ROCIO (acute kidney injury) ([facility] [address] [N17.9] <principal problem not specified> Precautions: Fall Chart, occupational therapy assessment, plan of care, and goals were reviewed. ASSESSMENT Patient continues with skilled OT services and is progressing towards goals. Patient not limited by tremors this session and demonstrating improved balance with functional mobility and transfers.  Patient continues to be limited by decreased insight into deficits, difficulty sequencing through LVAD switchovers, and decreased cardiopulmonary tolerance with episodes of VTAC this session and HR ranging from 90s-160s. Patient's daughter present and voicing concern regarding patient's long term plan. Continue to recommend inpatient rehab at this time to maximize patient safety and cardiopulmonary tolerance for participation in ADL and IADL tasks. If patient unable to discharge to IPR facility, recommend SNF or home with supervision for ADL tasks and physical assistance for IADL tasks due to patient's heart rate and decreased insight into deficits. Current Level of Function Impacting Discharge (ADLs): MIN A for functional transfers; SBA for ADLs seated; CGA for LB dressing at chair level using AE Other factors to consider for discharge: activity tolerance; cognition; safety awareness PLAN : 
Patient continues to benefit from skilled intervention to address the above impairments. Continue treatment per established plan of care. to address goals. Recommend with staff: OOB x 3 to chair w/ 2 assist for safety; bedside commode transfers x 2 for bowel movements Recommend next OT session: standing tolerance for grooming tasks Recommendation for discharge: (in order for the patient to meet his/her long term goals) Therapy 3 hours per day 5-7 days per week; if discharge to IPR facility is not possible, recommend SNF vs home with caregiver to provide assistance with ADL tasks and IADL tasks as needed This discharge recommendation: 
Has been made in collaboration with the attending provider and/or case management IF patient discharges home will need the following DME: patient will need a new rollator (daughter aware) SUBJECTIVE:  
Patient stated Camilla boyce (in regards to improved balance today).  OBJECTIVE DATA SUMMARY:  
Cognitive/Behavioral Status: 
Neurologic State: Alert Orientation Level: Oriented to person;Oriented to place;Oriented to situation;Disoriented to time Cognition: Other (comment); Follows commands(decreased insight into deficits) Perception: Appears intact Perseveration: No perseveration noted Safety/Judgement: Decreased insight into deficits Functional Mobility and Transfers for ADLs: 
Bed Mobility: 
Supine to Sit: Contact guard assistance Transfers: 
Sit to Stand: Contact guard assistance(from elevated surface) Bed to Chair: Assist x1;Additional time; Adaptive equipment;Minimum assistance(using rollator) Balance: 
Sitting: Without support; Intact Sitting - Static: Good (unsupported) Sitting - Dynamic: Good (unsupported) Standing: Impaired; With support Standing - Static: Fair Standing - Dynamic : Fair;Constant support ADL Intervention: 
  
 
Grooming Brushing Teeth: Contact guard assistance(standing at table top) Upper Body Dressing Assistance Dressing Assistance: Stand-by assistance(heavy verbal and visual cueing for LVAD management) Lower Body Dressing Assistance Underpants: Contact guard assistance(using reacher) Socks: Total assistance (dependent)(patient utilizes a sockaid at home) Toileting Toileting Assistance: (urinary urgency; utilizing purewick at this time) Cognitive Retraining Safety/Judgement: Decreased insight into deficits Activity Tolerance:  
Fair, requires rest breaks, and tachycardia with little activity (patient with no awareness of this happening) After treatment patient left in no apparent distress:  
Sitting in chair, Call bell within reach, and Caregiver / family present COMMUNICATION/COLLABORATION:  
The patients plan of care was discussed with: Physical therapist and Registered nurse. [employer] Time Calculation: 42 mins

## 2020-11-17 NOTE — CONSULTS
Labs reviewed and chart reviewed Consulted for abnormal gammopathy evaluation The immunofixation pattern appears unremarkable. Evidence of  
monoclonal protein is not apparent Component Latest Ref Rng & Units 11/5/2020  
 
      4:56 AM  
Free Kappa Lt Chains, serum 3.3 - 19.4 mg/L 118.2 (H) Free Lambda Lt Chains, serum 5.7 - 26.3 mg/L 83.3 (H) Kappa/Lambda ratio, serum 
    0.26 - 1.65   1.42 His labs do not indicate a paraproteinemia His light chains are elevated but the ratio is normal and hence the labs are secondary to his renal disease No further evaluation is necessary Thank you for the consult Courtney Blunt MD, MS [facility]

## 2020-11-18 NOTE — PROGRESS NOTES
[facility] Adult  Hospitalist Group Hospitalist Progress Note Kim Louis MD 
Answering service: [phone] OR [phone] from in house phone Date of Service:  2020 NAME:  Elder Johnson :  1950 MRN:  [id number] Admission Summary:  
78 y.o. female with past medical history significant for chronic systolic heart failure, ischemic cardiomyopathy with LVAD in place, hypertension, diabetes mellitus type 2 presented emergency room with increased fatigue and fall. Patient was recently admitted to the hospital and discharged on 2020 for similar hospital after being treated for cellulitis of the right lower extremity. Daughter states that since he was discharged patient has been more fatigue and having involuntary movement of her body. During that hospital stay she was started on 2 new medications including Levaquin and mexiletine. Daughter said that yesterday she fell at home injuring her left leg. She has an open wound and to the emergency room. She states that she had wound packed and sent home. Today daughter noticed that the wound started bleeding significantly for which she came to the emergency room. In the emergency room her work-up showed a hemoglobin of 6.9 down from8. As well she was found to have with worsening renal function. Admission was requested for ROCIO and anemia. In the ed wound was significantly bleeding. ED physician stopped bleeding with quick clot and its currently wrapped.  
  
 
Interval history / Subjective:  
Patient seen and examined  States that she is doing better and tremors has decreased signficantly. Assessment & Plan:  
 
Acute on chronic renal failure: stable 
-ATN secondary to hypovolemia due to bleed 
-nephrology and Advanced heart failure following   
-she is on oral bumex 1 mg Involuntary movements:improved -appreciate neurology input 
-gabapentin stopped and paxil dose reduced Right leg wound, s/p fall prior to [facility] Acute on chronic anemia: Likely secondary to bleed from leg wound -s/p 3 unit pRBCs 
-no other source of bleeding identified  
-s/p general surgery consult with bedside debridement 11/10 
-Xray negative for fracture Completed course of  cefepime-  switch to keflex  
-Hb stable. Chronic systolic heart failure NYHA class IV, ischemic cardiomyopathy status post LVAD Continue with Coreg 12.5 mg twice daily. On bumex INR stable Not on Entresto ACE inhibitor, ARB is due to CKD 
-Advanced heart failure following 
-Nuclear scan showed cardiac amyloidosis,hematology consulted. NSVT: improved 
-previously on mexiltine but patient reported nausea/GERD 
-EP consulted-continue  Ranexa Chronic LVAD infection:  
-ID consulted, resumed keflex 
  
Diabetes mellitus type 2: 
Sliding scale insulin before meals and at bedtime,blood glucose controlled 
  
COPD: stable-Continue maintenance inhaled steroids. Bronchodilators as needed. 
  
Depression: stable.  paxil dose decreased Morbid obesity: Body mass index is 46.34 kg/m².  OP management 
 
 Metabolic encephalopathy:resolved 
 
  
Hyperkalemia: resolved, due to ROCIO Code status: full DVT prophylaxis: theurapeutic INR Care Plan discussed with: Patient/Family Anticipated Disposition: rehab Anticipated Discharge: Greater than 48 hours Hospital Problems  Date Reviewed: 10/28/2020 Codes Class Noted POA Coagulopathy ([facility] [address] ICD-10-CM: A84.8 ICD-9-CM: 286.9  11/10/2020 Unknown Open wound of left lower leg ICD-10-CM: X00.681W ICD-9-CM: 891.0  11/10/2020 Unknown Anemia ICD-10-CM: D64.9 ICD-9-CM: 285.9  11/9/2020 Unknown ROCIO (acute kidney injury) ([facility] [address] ICD-10-CM: N17.9 ICD-9-CM: 584.9  11/9/2020 Unknown NICM (nonischemic cardiomyopathy) ([facility] [address] ICD-10-CM: I42.8 ICD-9-CM: 425.4  11/4/2020 Yes Chronic venous insufficiency ICD-10-CM: I87.2 ICD-9-CM: 459.81  11/4/2020 Yes Acute on chronic systolic CHF (congestive heart failure) (HCC) ICD-10-CM: M96.48 ICD-9-CM: 428.23, 428.0  10/27/2020 Yes Obesity, morbid ([facility] [address] ICD-10-CM: E66.01 
ICD-9-CM: 278.01  10/1/2020 Yes Review of Systems:  
Negative unless stated above Vital Signs:  
 Last 24hrs VS reviewed since prior progress note. Most recent are: 
Visit Vitals Pulse 84 Temp 98.6 °F (37 °C) Resp 16 Ht 5' 2\" (1.575 m) Wt 129 kg (284 lb 6.3 oz) SpO2 99% BMI 52.02 kg/m² Intake/Output Summary (Last 24 hours) at 11/17/2020 2052 Last data filed at 11/17/2020 1600 Gross per 24 hour Intake 730 ml Output 450 ml Net 280 ml Physical Examination:  
 
 
 
     
Constitutional:  no acute distress ENT:  Oral mucosa moist, oropharynx benign. Resp:  CTA bilaterally. No wheezing,no accessory muscle use CV:  LVAD hum GI:  Soft, non distended, obese, non tender. Musculoskeletal:  No edema, left leg wrapped, reviewed open wound pictures Neurologic:  alert and oriented X 3,moving all extremities, 
Skin: left leg skin tear, dry dressing on wound Data Review:  
 Review and/or order of clinical lab test 
 
Review and/or order of tests in the medicine section of [facility] Labs:  
 
Recent Labs 11/17/20 
0510 11/16/20 
2270 WBC 6.9 7.7 HGB 8.1* 8.6* HCT 26.4* 28.4*  
 174 Recent Labs 11/17/20 
0515 11/16/20 
0459 11/16/20 
0458 11/15/20 
0335  140  --  141  
K 4.6 5.2*  --  4.9  110*  --  110* CO2 29 26  --  29  
BUN 50* 55*  --  59* CREA 2.51* 2.67*  --  2.50* * 128*  --  127* CA 8.8 8.9  --  9.1 MG 2.0  --  2.2 2.4 PHOS 2.5*  --   --   --   
 
Recent Labs 11/17/20 
0515 11/16/20 
0459 11/15/20 
0335 ALT  --  10* 11* AP  --  71 70 TBILI  --  0.5 0.4 TP  --  7.5 7.2 ALB 2.9* 3.1* 2.9*  
GLOB  --  4.4* 4.3*  
 
 Recent Labs 11/17/20 
0510 11/16/20 
0459 11/15/20 
0335 INR 2.4* 2.2* 2.1* PTP 24.3* 22.3* 20.8* No results for input(s): FE, TIBC, PSAT, FERR in the last 72 hours. Lab Results Component Value Date/Time Folate 10.0 10/28/2020 03:56 AM  
  
No results for input(s): PH, PCO2, PO2 in the last 72 hours. No results for input(s): CPK, CKNDX, TROIQ in the last 72 hours. No lab exists for component: CPKMB Lab Results Component Value Date/Time Cholesterol, total 192 10/01/2020 12:00 AM  
 HDL Cholesterol 29 (L) 10/01/2020 12:00 AM  
 LDL Chol Calc (NIH) 123 (H) 10/01/2020 12:00 AM  
 Triglyceride 222 (H) 10/01/2020 12:00 AM  
 
Lab Results Component Value Date/Time Glucose (POC) 172 (H) 11/17/2020 03:59 PM  
 Glucose (POC) 180 (H) 11/17/2020 11:48 AM  
 Glucose (POC) 137 (H) 11/17/2020 06:47 AM  
 Glucose (POC) 176 (H) 11/16/2020 09:12 PM  
 Glucose (POC) 189 (H) 11/16/2020 05:02 PM  
 
Lab Results Component Value Date/Time Color Yellow/Straw 11/09/2020 12:05 AM  
 Appearance Turbid (A) 11/09/2020 12:05 AM  
 Specific gravity 1.013 11/09/2020 12:05 AM  
 pH (UA) 5.0 11/09/2020 12:05 AM  
 Protein 100 (A) 11/09/2020 12:05 AM  
 Glucose Negative 11/09/2020 12:05 AM  
 Ketone Negative 11/09/2020 12:05 AM  
 Bilirubin Negative 11/09/2020 12:05 AM  
 Urobilinogen 0.1 11/09/2020 12:05 AM  
 Nitrites Negative 11/09/2020 12:05 AM  
 Leukocyte Esterase Trace (A) 11/09/2020 12:05 AM  
 Bacteria Negative 11/09/2020 12:05 AM  
 WBC 0-5 11/09/2020 12:05 AM  
 RBC 0-5 11/09/2020 12:05 AM  
 
 
 
Medications Reviewed:  
 
Current Facility-Administered Medications Medication Dose Route Frequency  hydrALAZINE (APRESOLINE) 20 mg/mL injection 10 mg  10 mg IntraVENous Q6H PRN  
 hydrALAZINE (APRESOLINE) tablet 25 mg  25 mg Oral TID  Warfarin - Pharmacy Dosing   Other Rx Dosing/Monitoring  bumetanide (BUMEX) tablet 1 mg  1 mg Oral DAILY  PARoxetine (PAXIL) tablet 20 mg  20 mg Oral DAILY  albuterol-ipratropium (DUO-NEB) 2.5 MG-0.5 MG/3 ML  3 mL Nebulization Q6H PRN  
 cephALEXin (KEFLEX) capsule 250 mg  250 mg Oral BID  ranolazine ER (RANEXA) tablet 1,000 mg  1,000 mg Oral BID  ferrous sulfate tablet 325 mg  325 mg Oral DAILY WITH BREAKFAST  docusate sodium (COLACE) capsule 100 mg  100 mg Oral BID  hydrOXYzine HCL (ATARAX) tablet 10 mg  10 mg Oral TID PRN  
 glucose chewable tablet 16 g  4 Tab Oral PRN  
 glucagon (GLUCAGEN) injection 1 mg  1 mg IntraMUSCular PRN  
 dextrose 10% infusion 0-250 mL  0-250 mL IntraVENous PRN  
 insulin lispro (HUMALOG) injection   SubCUTAneous AC&HS  sodium chloride (NS) flush 5-40 mL  5-40 mL IntraVENous Q8H  
 sodium chloride (NS) flush 5-40 mL  5-40 mL IntraVENous PRN  
 acetaminophen (TYLENOL) tablet 650 mg  650 mg Oral Q6H PRN Or  
 acetaminophen (TYLENOL) suppository 650 mg  650 mg Rectal Q6H PRN  polyethylene glycol (MIRALAX) packet 17 g  17 g Oral DAILY PRN  promethazine (PHENERGAN) tablet 12.5 mg  12.5 mg Oral Q6H PRN Or  
 ondansetron (ZOFRAN) injection 4 mg  4 mg IntraVENous Q6H PRN  
 
______________________________________________________________________ EXPECTED LENGTH OF STAY: 4d 0h 
ACTUAL LENGTH OF STAY:          8 Jagjit Krause MD

## 2020-11-18 NOTE — PROGRESS NOTES
Pharmacist Note  Warfarin Dosing Consult provided for this 78 y. o.female to manage warfarin for Atrial Fibrillation; LVAD INR Goal: 1.8-2.5 Home regimen/ tablet size: 4 mg daily Drugs that may increase INR: None Drugs that may decrease INR: None Other current anticoagulants/ drugs that may increase bleeding risk: None Risk factors: Age > 72 Daily INR ordered: YES Recent Labs 11/18/20 
0515 11/17/20 
0510 11/16/20 
0459 11/16/20 
6511 HGB  --  8.1*  --  8.6* INR 2.6* 2.4* 2.2*  --   
 
Date               INR                  Dose 11/14       2   4 mg  
11/15              2.1                   4 mg       
11/16              2.2                   4 mg      
11/17              2.4                   4 mg  
11/18              2.6                   2.5 mg    
                                                                            
Assessment/ Plan: Will order warfarin 2.5 mg PO x 1 dose. Pharmacy will continue to monitor daily and adjust therapy as indicated. Please contact the pharmacist at f [phone] or  for outpatient recommendations if needed.

## 2020-11-18 NOTE — PROGRESS NOTES
[address] [facility]  Hospitalist Group Hospitalist Progress Note Casandra Negrete MD 
Answering service: [phone] or [phone] from in house phone Date of Service:  2020 NAME:  Tianna Hdz :  1950 MRN:  [id number] Admission Summary:  
78 y.o. female with past medical history significant for chronic systolic heart failure, ischemic cardiomyopathy with LVAD in place, hypertension, diabetes mellitus type 2 presented emergency room with increased fatigue and fall. Patient was recently admitted to the hospital and discharged on 2020 for similar hospital after being treated for cellulitis of the right lower extremity. Daughter states that since he was discharged patient has been more fatigue and having involuntary movement of her body. During that hospital stay she was started on 2 new medications including Levaquin and mexiletine. Daughter said that yesterday she fell at home injuring her left leg. She has an open wound and to the emergency room. She states that she had wound packed and sent home. Today daughter noticed that the wound started bleeding significantly for which she came to the emergency room. In the emergency room her work-up showed a hemoglobin of 6.9 down from8. As well she was found to have with worsening renal function. Admission was requested for ROCIO and anemia. In the ed wound was significantly bleeding. ED physician stopped bleeding with quick clot and its currently wrapped.  
  
 
Interval history / Subjective:  
Patient seen and examined  She was sitting in the chair,denied any chest pain,SOB,nausea/vomiting. Did not have tremors today Assessment & Plan:  
 
Acute on chronic renal failure: stable 
-ATN secondary to hypovolemia due to bleed 
-nephrology and Advanced heart failure following   
-she is on oral bumex 1 mg Involuntary movements:resolved -appreciate neurology input 
-gabapentin stopped and paxil dose reduced Right leg wound, s/p fall prior to [facility] Acute on chronic anemia: Likely secondary to bleed from leg wound -s/p 3 unit pRBCs 
-no other source of bleeding identified  
-s/p general surgery consult with bedside debridement 11/10 
-Xray negative for fracture Completed course of  cefepime-  switch to keflex  
-Hb stable. Chronic systolic heart failure NYHA class IV, ischemic cardiomyopathy status post LVAD Continue with Coreg 12.5 mg twice daily. On bumex INR stable Not on Entresto ACE inhibitor, ARB is due to CKD 
-Advanced heart failure following 
-Nuclear scan showed cardiac amyloidosis,hematology consulted. NSVT: improved 
-previously on mexiltine but patient reported nausea/GERD 
-EP consulted-continue  Ranexa Chronic LVAD infection:  
-ID consulted, resumed keflex 
  
Diabetes mellitus type 2: 
Sliding scale insulin before meals and at bedtime,blood glucose controlled 
  
COPD: stable-Continue maintenance inhaled steroids. Bronchodilators as needed. 
  
Depression: stable.  paxil dose decreased Morbid obesity: Body mass index is 46.34 kg/m².  OP management 
 
 Metabolic encephalopathy:resolved 
 
  
Hyperkalemia: resolved, due to ROCIO Code status: full DVT prophylaxis: theurapeutic INR Care Plan discussed with: Patient/Family Anticipated Disposition: rehab Anticipated Discharge: Greater than 48 hours Hospital Problems  Date Reviewed: 10/28/2020 Codes Class Noted POA Coagulopathy ([facility] [address] ICD-10-CM: X21.6 ICD-9-CM: 286.9  11/10/2020 Unknown Open wound of left lower leg ICD-10-CM: F08.469X ICD-9-CM: 891.0  11/10/2020 Unknown Anemia ICD-10-CM: D64.9 ICD-9-CM: 285.9  11/9/2020 Unknown ROCIO (acute kidney injury) ([facility] [address] ICD-10-CM: N17.9 ICD-9-CM: 584.9  11/9/2020 Unknown NICM (nonischemic cardiomyopathy) ([facility] [address] ICD-10-CM: I42.8 ICD-9-CM: 425.4  11/4/2020 Yes Chronic venous insufficiency ICD-10-CM: I87.2 ICD-9-CM: 459.81  11/4/2020 Yes Acute on chronic systolic CHF (congestive heart failure) (HCC) ICD-10-CM: Y22.90 ICD-9-CM: 428.23, 428.0  10/27/2020 Yes Obesity, morbid ([facility] [address] ICD-10-CM: E66.01 
ICD-9-CM: 278.01  10/1/2020 Yes Review of Systems:  
Negative unless stated above Vital Signs:  
 Last 24hrs VS reviewed since prior progress note. Most recent are: 
Visit Vitals Pulse 87 Temp 98.1 °F (36.7 °C) Resp 18 Ht 5' 2\" (1.575 m) Wt 128.2 kg (282 lb 10.1 oz) SpO2 95% BMI 51.69 kg/m² Intake/Output Summary (Last 24 hours) at 11/18/2020 1518 Last data filed at 11/18/2020 1215 Gross per 24 hour Intake 830 ml Output 425 ml Net 405 ml Physical Examination:  
 
 
 
     
Constitutional:  no acute distress ENT:  Oral mucosa moist, oropharynx benign. Resp:  CTA bilaterally. No wheezing,no accessory muscle use CV:  LVAD hum GI:  Soft, non distended, obese, non tender. Musculoskeletal:  No edema, left leg wrapped, reviewed open wound pictures Neurologic:  alert and oriented X 3,moving all extremities, 
Skin: left leg skin tear, dry dressing on wound Data Review:  
 Review and/or order of clinical lab test 
 
Review and/or order of tests in the medicine section of CPT Labs:  
 
Recent Labs 11/18/20 
0374 11/17/20 
0510 WBC 6.3 6.9 HGB 8.3* 8.1* HCT 27.7* 26.4*  
 162 Recent Labs 11/18/20 
1358 11/18/20 
2659 11/18/20 
0515 11/17/20 
0515 11/16/20 
0459 11/16/20 
6954 NA  --   --  140 140 140  --   
K  --   --  4.8 4.6 5.2*  --   
CL  --   --  109* 107 110*  --   
CO2  --   --  27 29 26  --   
BUN  --   --  51* 50* 55*  --   
CREA  --   --  2.58* 2.51* 2.67*  --   
GLU  --   --  121* 131* 128*  --   
CA  --   --  8.6 8.8 8.9  --   
MG  --  2.0  --  2.0  --  2.2 PHOS  --   --   --  2.5*  --   --   
 URICA 8.2*  --   --   --   --   --   
 
Recent Labs 11/18/20 
0515 11/17/20 
0515 11/16/20 
0459 ALT 9*  --  10* AP 66  --  71  
TBILI 0.4  --  0.5 TP 6.9  --  7.5 ALB 3.0* 2.9* 3.1*  
GLOB 3.9  --  4.4* Recent Labs 11/18/20 
0515 11/17/20 
0510 11/16/20 
0459 INR 2.6* 2.4* 2.2* PTP 25.8* 24.3* 22.3* Recent Labs 11/18/20 
1358 TIBC 236* PSAT 20 FERR 411* Lab Results Component Value Date/Time Folate 10.0 10/28/2020 03:56 AM  
  
No results for input(s): PH, PCO2, PO2 in the last 72 hours. No results for input(s): CPK, CKNDX, TROIQ in the last 72 hours. No lab exists for component: CPKMB Lab Results Component Value Date/Time Cholesterol, total 192 10/01/2020 12:00 AM  
 HDL Cholesterol 29 (L) 10/01/2020 12:00 AM  
 LDL Chol Calc (NIH) 123 (H) 10/01/2020 12:00 AM  
 Triglyceride 222 (H) 10/01/2020 12:00 AM  
 
Lab Results Component Value Date/Time Glucose (POC) 158 (H) 11/18/2020 11:57 AM  
 Glucose (POC) 141 (H) 11/18/2020 07:07 AM  
 Glucose (POC) 145 (H) 11/17/2020 09:16 PM  
 Glucose (POC) 172 (H) 11/17/2020 03:59 PM  
 Glucose (POC) 180 (H) 11/17/2020 11:48 AM  
 
Lab Results Component Value Date/Time Color Yellow/Straw 11/09/2020 12:05 AM  
 Appearance Turbid (A) 11/09/2020 12:05 AM  
 Specific gravity 1.013 11/09/2020 12:05 AM  
 pH (UA) 5.0 11/09/2020 12:05 AM  
 Protein 100 (A) 11/09/2020 12:05 AM  
 Glucose Negative 11/09/2020 12:05 AM  
 Ketone Negative 11/09/2020 12:05 AM  
 Bilirubin Negative 11/09/2020 12:05 AM  
 Urobilinogen 0.1 11/09/2020 12:05 AM  
 Nitrites Negative 11/09/2020 12:05 AM  
 Leukocyte Esterase Trace (A) 11/09/2020 12:05 AM  
 Bacteria Negative 11/09/2020 12:05 AM  
 WBC 0-5 11/09/2020 12:05 AM  
 RBC 0-5 11/09/2020 12:05 AM  
 
 
 
Medications Reviewed:  
 
Current Facility-Administered Medications Medication Dose Route Frequency  warfarin (COUMADIN) tablet 2.5 mg  2.5 mg Oral ONCE  
  [START ON 11/19/2020] bumetanide (BUMEX) tablet 1 mg  1 mg Oral DAILY  hydrALAZINE (APRESOLINE) 20 mg/mL injection 10 mg  10 mg IntraVENous Q6H PRN  
 hydrALAZINE (APRESOLINE) tablet 25 mg  25 mg Oral TID  Warfarin - Pharmacy Dosing   Other Rx Dosing/Monitoring  PARoxetine (PAXIL) tablet 20 mg  20 mg Oral DAILY  albuterol-ipratropium (DUO-NEB) 2.5 MG-0.5 MG/3 ML  3 mL Nebulization Q6H PRN  
 cephALEXin (KEFLEX) capsule 250 mg  250 mg Oral BID  ranolazine ER (RANEXA) tablet 1,000 mg  1,000 mg Oral BID  ferrous sulfate tablet 325 mg  325 mg Oral DAILY WITH BREAKFAST  docusate sodium (COLACE) capsule 100 mg  100 mg Oral BID  hydrOXYzine HCL (ATARAX) tablet 10 mg  10 mg Oral TID PRN  
 glucose chewable tablet 16 g  4 Tab Oral PRN  
 glucagon (GLUCAGEN) injection 1 mg  1 mg IntraMUSCular PRN  
 dextrose 10% infusion 0-250 mL  0-250 mL IntraVENous PRN  
 insulin lispro (HUMALOG) injection   SubCUTAneous AC&HS  sodium chloride (NS) flush 5-40 mL  5-40 mL IntraVENous Q8H  
 sodium chloride (NS) flush 5-40 mL  5-40 mL IntraVENous PRN  
 acetaminophen (TYLENOL) tablet 650 mg  650 mg Oral Q6H PRN Or  
 acetaminophen (TYLENOL) suppository 650 mg  650 mg Rectal Q6H PRN  polyethylene glycol (MIRALAX) packet 17 g  17 g Oral DAILY PRN  promethazine (PHENERGAN) tablet 12.5 mg  12.5 mg Oral Q6H PRN Or  
 ondansetron (ZOFRAN) injection 4 mg  4 mg IntraVENous Q6H PRN  
 
______________________________________________________________________ EXPECTED LENGTH OF STAY: 6d 12h ACTUAL LENGTH OF STAY:          9 Usha Boswell MD

## 2020-11-18 NOTE — PROGRESS NOTES
[address] [facility] [address] [facility] in [place], [place] Inpatient Progress Note Patient name: Gena King Patient : 1950 Patient MRN: [id number] Attending MD: Demond Aguilar MD 
Date of service: 20 CHIEF COMPLAINT: 
Cellulitis 
  
24 hr events Hypertensive, MAPs 100s Tremors continue to improve No ectopy on ranolazine PLAN: 
Continue current device speed 9400rpm; intolerant to higher speed due to VT Expect increasing intolerance to BB/ACEi/ARB/ARNI due to aTTR; on hold for now Application for patient assistance for tafamidis as OP Hydralazine 25mg po TID Hydralazine 10mg prn IV Q6hr, maintain MAP 70-90mmHg Intolerant of spironolactone due to hyperkalemia  
Continue Bumex 1mg daily;  Diuretic management per nephrology Coumadin per pharmacy; goal INR lowered to 1.8-2.5 EP cardiology consult appreciate; no ectopy on ranolazine Transfuse to keep HGB>7 Antibiotics per ID- discussed discharge ABX regimen Use home Trilogy when napping and QHS 
DTC consult appreciated PT/OT- recommendations for discharge Check uric acid, iron profile, ferritin, invitae Nutrition consult appreciated Neurology consult appreciated; held gabapentin with ROCIO 
GI, urogynecology, and pulmonary consults as OP Palliative care consult appreciated; patient remains full code, AMD given to patient/daughter to complete Hematology consult appreciate input; elevated free light chains Pulmonary consult appreciated; Need assistance with discharge planning- patient uses Trilogy for COPD, IPR does not accept Trilogy but will accept V60 BiPAP? Case management to assist with discharge planning- No IPR will accept Trilogy machines and LVAD. Discharge TBD 
 
  
IMPRESSION: 
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 
C/b cardiogenic shock s/p Impella bridge to LVAD S/p HeartMate 2 LVAD implantation (4/5/17 by Dr. Alvarez Lara) · C/b delayed extubation due to severe COPD 
· C/b critical illness polyneuropathy · C/b prolonged hospitalization post-LVAD, 55 days · C/b sternal wound infection s/p debridement (by Dr. Kathrin Wells) s/p wound vac · C/b sacral ulcer · Would culture positive for Staph aureus, not MRSA · C/b LVAD site drainage, improved S/p CRT-ICD · ICD fired due to electrolyte imbalance (2011) H/o breast cancer (1992) · s/p bilateral mastectomy/chemo and endometrial cancer s/p hysterectomy · Lymphedema of LLE due to cancer treatment Severe COPD with FEV1 50% Depression Atrial fibrillation H/o \"two mini strokes\" S/p fall with hip facture · Right hip hemmiarthroplasty (5/23/18) by Dr. Manuel Lombardo) · S/p removed hip hardwarare due to pain (4/15/19) COPD severe CKD, stage 3 Hyperkalemia Pulmonary hypertension Cardiac risk factors: · Morbid obesity, Body mass index is 53.63 kg/m². · DM2 insulin dependent · JED on CPAP 
· HL Urinary incontinence, severe · no procedures due to anticoagulation · conservative management with Detrol 4 pills bid Endometrial cancer (2002) HTN 
HL 
aTTR amyloidosis  
Left leg wound following fall Encephalopathy Renal failure  
  
CARDIAC IMAGING: 
Echo (9/24/19) LVEF 20-25%, AV opens 1:1, no AR Echo (5/29/18) LVEF 10-%, ramps study done, report in [facility] Echo (1/9/18) ramp study done, LVEDD 7.1cm LHC (11/9/18) 2 vessel disease with 90% OM, 80% PDA, DSA to PDA branch 
TTE (10/27/20) LVEF 15-20% LVIDd 7.26cm RVIDd 2.82cm Tapse 1.14cm 
  
HEMODYNAMICS: 
 [address] 11/3 shows CVP 14 mmHg, PA 36/18 mmHg, PCWP 15 mmHg, Sal CI 2.4 
CPEST not done 6MW not done 
  
OTHER IMAGING: 
EGD/C-scope (19) no active bleeding and polyp removed. 
  
INTERVAL HISTORY: 
Doing well Feels great Labs improving 
  
FAMILY HISTORY: 
Mother  76 years, diagnosed with DM, HTN, CAD Father  [de-identified]years old, HTN, CAD, MI 
Positive for DM and heart disease in the family, brother with valve replacement 
  
SOCIAL HISTORY: 
Never alcohol, never smoked, , lives alone, no illicit drug use, lives in house, ambulatory status indpendedn, children: daughter, occupation retired Lives alone. LVAD INTERROGATION: 
Device interrogated in person Device function normal, normal flow, no events LVAD Pump Speed (RPM): (P) 9400 Pump Flow (LPM): (P) 4.9 MAP: 80 
PI (Pulsitility Index): (P) 6.5 Power: (P) 5.7  Test: No 
Back Up  at Bedside & Labeled: Yes Power Module Test: No 
Driveline Site Care: No 
Driveline Dressing: Clean, Dry, and Intact Outpatient: No 
MAP in Therapeutic Range (Outpatient): Yes Testing Alarms Reviewed: Yes 
Back up SC speed: 9400 Back up Low Speed Limit: 9200 Emergency Equipment with Patient?: Yes Emergency procedures reviewed?: Yes Drive line site inspected?: No 
Drive line intergrity inspected?: Yes Drive line dressing changed?: No 
 
PHYSICAL EXAM: 
Visit Vitals Pulse 85 Temp 97.7 °F (36.5 °C) Resp 19 Ht 5' 2\" (1.575 m) Wt 282 lb 10.1 oz (128.2 kg) SpO2 99% BMI 51.69 kg/m² General: Patient is well developed, well-nourished in no acute distress,getting ready to work w/ PT/OT 
HEENT: Normocephalic and atraumatic. No scleral icterus. Pupils are equal, round and reactive to light and accomodation. No conjunctival injection. Oropharynx is clear. Neck: Supple. No evidence of thyroid enlargements or lymphadenopathy. JVD: Cannot be appreciated Lungs: Breath sounds are equal and clear bilaterally. No wheezes, rhonchi, or rales. Heart: Regular rate and rhythm with normal S1 and S2. No murmurs, gallops or rubs. +ICD DARRELL chest  
Abdomen: Soft, obese, no mass or tenderness. No organomegaly or hernia. Bowel sounds present. Genitourinary and rectal: deferred Extremities: No cyanosis, clubbing, + edema. Neurologic:  +hand tremors Psychiatric: Awake, alert and oriented x 3. Appropriate mood and affect. Skin: Redness, ruddiness and edema, LLE wound s/p fall, skin breakdown RLE. 
  
 
REVIEW OF SYSTEMS: 
General: Denies fever, night sweats. Ear, nose and throat: Denies difficulty hearing, sinus problems, runny nose, post-nasal drip, ringing in ears, mouth sores, loose teeth, ear pain, nosebleeds, sore throate, facial pain or numbess Cardiovascular: see above in the interval history Respiratory: Denies cough, wheezing, sputum production, hemoptysis. Gastrointestinal: Denies heartburn, constipation, intolerance to certain foods, diarrhea, abdominal pain, nausea, vomiting, difficulty swallowing, blood in stool Kidney and bladder: Denies painful urination, frequent urination, urgency, prostate problems and impotence Musculoskeletal: Denies joint pain, +muscle weakness Skin and hair: Denies change in existing skin lesions, hair loss or increase, breast changes PAST MEDICAL HISTORY: 
Past Medical History:  
Diagnosis Date  Asthma  Cancer ([facility] [address] breast  
 Cancer ([facility] [address]   
 endometrial  
 Congestive heart failure, unspecified  CRI (chronic renal insufficiency)  Depression  Diabetes ([facility] [address]  Hypertension PAST SURGICAL HISTORY: 
Past Surgical History:  
Procedure Laterality Date  HX HERNIA REPAIR    
 HX HYSTERECTOMY  HX MASTECTOMY FAMILY HISTORY: 
History reviewed. No pertinent family history. SOCIAL HISTORY: 
Social History Socioeconomic History  Marital status:  Spouse name: Not on file  Number of children: Not on file  Years of education: Not on file  Highest education level: Not on file Tobacco Use  Smoking status: Never Smoker  Smokeless tobacco: Never Used Substance and Sexual Activity  Alcohol use: Not Currently LABORATORY RESULTS: 
  
Labs Latest Ref Rng & Units 11/18/2020 11/17/2020 11/16/2020 11/15/2020 11/14/2020 11/13/2020 11/12/2020 WBC 3.6 - 11.0 K/uL 6.3 6.9 7.7 5.4 5.5 6.7 6.5  
RBC 3.80 - 5.20 M/uL 2.96(L) 2.90(L) 3.09(L) 3.01(L) 2.87(L) 2.80(L) 2.68(L) Hemoglobin 11.5 - 16.0 g/dL 8. 3(L) 8. 1(L) 8.6(L) 8. 3(L) 8.2(L) 7. 8(L) 7. 4(L) Hematocrit 35.0 - 47.0 % 27. 7(L) 26. 4(L) 28. 4(L) 27. 4(L) 26. 4(L) 25. 1(L) 24. 3(L) MCV 80.0 - 99.0 FL 93.6 91.0 91.9 91.0 92.0 89.6 90.7 Platelets 752 - 395 K/uL 167 162 174 172 164 136(L) 123(L) Lymphocytes 12 - 49 % - - - - - - - Monocytes 5 - 13 % - - - - - - - Eosinophils 0 - 7 % - - - - - - - Basophils 0 - 1 % - - - - - - - Albumin 3.5 - 5.0 g/dL 3. 0(L) 2. 9(L) 3. 1(L) 2. 9(L) 2. 9(L) 2. 8(L) 3.0(L) Calcium 8.5 - 10.1 MG/DL 8.6 8.8 8.9 9.1 8.6 8.4(L) 8.7 Glucose 65 - 100 mg/dL 121(H) 131(H) 128(H) 127(H) 130(H) 213(H) 113(H) BUN 6 - 20 MG/DL 51(H) 50(H) 55(H) 59(H) 69(H) 75(H) 96(H) Creatinine 0.55 - 1.02 MG/DL 2.58(H) 2.51(H) 2.67(H) 2.50(H) 2.69(H) 3.08(H) 3.88(H) Sodium 136 - 145 mmol/L 140 140 140 141 142 139 140 Potassium 3.5 - 5.1 mmol/L 4.8 4.6 5.2(H) 4.9 5.1 4.6 4.6 TSH 0.450 - 4.500 uIU/mL - - - - - - -  
LDH 81 - 246 U/L 246 205 226 217 240 219 266(H) Some recent data might be hidden Lab Results Component Value Date/Time TSH 2.980 10/01/2020 12:00 AM  
 
 
ALLERGY: 
No Known Allergies CURRENT MEDICATIONS: 
 
Current Facility-Administered Medications:  
  warfarin (COUMADIN) tablet 2.5 mg, 2.5 mg, Oral, ONCE, Charan Corbett MD 
  [START ON 11/19/2020] bumetanide (BUMEX) tablet 1 mg, 1 mg, Oral, DAILY, Yandy Davies MD 
   hydrALAZINE (APRESOLINE) 20 mg/mL injection 10 mg, 10 mg, IntraVENous, Q6H PRN, Triny Limon NP, 10 mg at 11/17/20 1658   hydrALAZINE (APRESOLINE) tablet 25 mg, 25 mg, Oral, TID, Mihaela Christiansen NP, 25 mg at 11/18/20 1620   Warfarin - Pharmacy Dosing, , Other, Rx Dosing/Monitoring, Chrissy Henriquez MD 
  PARoxetine (PAXIL) tablet 20 mg, 20 mg, Oral, DAILY, Albert Akhtar, DO, 20 mg at 11/18/20 1459   albuterol-ipratropium (DUO-NEB) 2.5 MG-0.5 MG/3 ML, 3 mL, Nebulization, Q6H PRN, Lelia Johnson MD, 3 mL at 11/14/20 1549   cephALEXin (KEFLEX) capsule 250 mg, 250 mg, Oral, BID, Nicholas Rubio MD, 250 mg at 11/18/20 5661   ranolazine ER (RANEXA) tablet 1,000 mg, 1,000 mg, Oral, BID, Joe Morales MD, 1,000 mg at 11/18/20 2265   ferrous sulfate tablet 325 mg, 325 mg, Oral, DAILY WITH BREAKFAST, Rosalba Valenzuela M, DO, 325 mg at 11/18/20 9697   docusate sodium (COLACE) capsule 100 mg, 100 mg, Oral, BID, Rosalba Valenzuela M, DO, 100 mg at 11/18/20 3506   hydrOXYzine HCL (ATARAX) tablet 10 mg, 10 mg, Oral, TID PRN, Phuc [employer] M, DO 
  glucose chewable tablet 16 g, 4 Tab, Oral, PRN, Rosalba Valenzuela M, DO 
  glucagon (GLUCAGEN) injection 1 mg, 1 mg, IntraMUSCular, PRN, Rosalba KATE, DO 
  dextrose 10% infusion 0-250 mL, 0-250 mL, IntraVENous, PRN, Phuc Guy, [employer] M, DO 
  insulin lispro (HUMALOG) injection, , SubCUTAneous, AC&HS, Rosalba KATE, DO, 2 Units at 11/18/20 1248   sodium chloride (NS) flush 5-40 mL, 5-40 mL, IntraVENous, Q8H, Madeleine Cisneros MD, 10 mL at 11/18/20 1620 
  sodium chloride (NS) flush 5-40 mL, 5-40 mL, IntraVENous, PRN, Madeleine Cisneros MD 
  acetaminophen (TYLENOL) tablet 650 mg, 650 mg, Oral, Q6H PRN, 650 mg at 11/17/20 2123 **OR** acetaminophen (TYLENOL) suppository 650 mg, 650 mg, Rectal, Q6H PRN, Madeleine Cisneros MD 
  polyethylene glycol (MIRALAX) packet 17 g, 17 g, Oral, DAILY PRN, Madeleine Cisneros MD 
   promethazine (PHENERGAN) tablet 12.5 mg, 12.5 mg, Oral, Q6H PRN **OR** ondansetron (ZOFRAN) injection 4 mg, 4 mg, IntraVENous, Q6H PRN, Dori Chapman MD 
 
PATIENT CARE TEAM: 
Patient Care Team: 
Marika Mcmillan NP as PCP - General (Nurse Practitioner) Thank you for allowing me to participate in this patient's care. TEA Lawrence [address] [place] [address], [address] Phone: [phone] Fax: [phone] [facility] ATTENDING ADDENDUM Patient was seen and examined in person. Data and notes were reviewed. I have discussed and agree with the plan as noted in the NP note above without further additions. Charmaine Claude, MD PhD 
Beatriz Villasenor [phone] [facility]

## 2020-11-18 NOTE — PROGRESS NOTES
[facility] will not accept trelegy- no other IPRs in the [facility] are able to accept LVAD patient's- other option is SNF, CM will need to update daughter and talk further with [facility] team. ENMA Cummings

## 2020-11-18 NOTE — PROGRESS NOTES
Transitions of Care: 32% risk of re-admission  
 
 -[facility] referral, pending- issues concerning Trelegy machine  
 -ALS transport (LVAD) The CM reviewed previous CM notes- patient at baseline lives independently in own one-level home, however, has a history of multiple falls, daughter Nohemi Saini is very involved- owns rollator, walker, and trelegy. [facility] is following patient, however, they cannot accept Trelegy- inquiring if patient can be placed on V 60 BiPAP,  
 
CM discussed with providers- Per AHF, hospitalist, and Pulmonary- BiPAP is not medically equivlent, patient will need Trelegy-  
 
CM called [facility], and discussed above- she will call CM back, TBD if they can accept patient due to trelegy barriers. The patient requires rehab- no other IPR in the [facility] can accept due to LVAD needs- [facility] no longer is accept LVADs at this time, CM called Liaison to confirm they are not currently accepting LVADs. CM will await call from Patricia Leonard with [address] [place] [address] regarding trelegy- if accepted, patient will need COVID-19 testing. Patient was open to [facility] prior to admission, skilled nursing, PT/OT. 11:38 a.m.- The CM called Melinda, the patient's daughter, and provided update- she is aware that [facility] is still pending due to trelegy needs.   
 
CM discussed resources s/p rehab- Nohemi Saini lives in [place] [place], she comes to [place] frequently to help her mom, has groceries delivered for the patient, Laura Melendez is interested/looking into hiring additional in-home help, also exploring the idea of the patient coming to live with her in [place] eventually (this would not be immediate- she would have to get the basement finished with a contractor), etc. The CM will e-mail Melinda resources on private duty, transportation resources in [facility], and Qnary resources for possible OTILIO placement in the future (may be difficult due to LVAD, but will provide the free resource to family). Neftali Newsome is supportive and involved in patient's care, needs rehab placement- CM will send resources to family for LTC planning in the future. 12:36 p.m.- The CM received call from [facility] with [facility]- they are unable to accept the trelegy. [facility] is hoping that they can start accepting in 2021, but currently cannot accept the patient due to trelegy need. CM will need to discuss with AHF- no other IPR in [facility] is LVAD trained. Disposition TBD- will need to discuss with AHF.

## 2020-11-18 NOTE — PROGRESS NOTES
[facility] 
 [address] [facility], [address] [facility] Phone: [phone]  
  
Nephrology Progress Note 
Flaco Davis     1950     [id number] Date of Admission : 11/9/2020 11/18/20 CC: Follow up for ARF Assessment and Plan ROCIO on CKD 
- resolving ATN  
- continue Bumex 1 mg daily on d/c - I will see her in f/u in 1 week as telehealth visit. She needs to continue w/ weekly labs through home health - we can transition her to our CKD clinic in [facility] area once she is deemed stable   
  
CKD stage III. - baseline around 2 
-Etiology of CKD is likely diabetes hypertension and cardiomyopathy. However she does have a positive PYP testing which raises the possibility of amyloidosis. 
  
Chronic systolic CHF, stage C NYHA class IV 
-Combined ischemic and nonischemic cardiomyopathy 
-S/p HM 2 LVAD implant 4/15/2017 by Dr. Estella Fried at [facility] 
- hx of recurrent VT : off mexiletine - Recent acute on chronic RV failure. - Recent sternal wound infection with staph aureus and Morganella. Has LVAD site drainage. 
  
Left Calf laceration Blood loss anemia - per primary team 
 
Chronic anemia : +ve PYP testing. Consider Bone marrow Bx Severe COPD. Pulmonary hypertension. Chronic A. fib. History of endometrial Ca ATTR amyloidosis Interval History: 
Seen and examined. No more involuntary movements Cr stable Cough + 
SOB + Has R basal rales on exam today Review of Systems: A comprehensive review of systems was negative except for that written in the HPI. Current Medications:  
Current Facility-Administered Medications Medication Dose Route Frequency  warfarin (COUMADIN) tablet 2.5 mg  2.5 mg Oral ONCE  hydrALAZINE (APRESOLINE) 20 mg/mL injection 10 mg  10 mg IntraVENous Q6H PRN  
 hydrALAZINE (APRESOLINE) tablet 25 mg  25 mg Oral TID  Warfarin - Pharmacy Dosing   Other Rx Dosing/Monitoring  bumetanide (BUMEX) tablet 1 mg  1 mg Oral DAILY  PARoxetine (PAXIL) tablet 20 mg  20 mg Oral DAILY  albuterol-ipratropium (DUO-NEB) 2.5 MG-0.5 MG/3 ML  3 mL Nebulization Q6H PRN  
 cephALEXin (KEFLEX) capsule 250 mg  250 mg Oral BID  ranolazine ER (RANEXA) tablet 1,000 mg  1,000 mg Oral BID  ferrous sulfate tablet 325 mg  325 mg Oral DAILY WITH BREAKFAST  docusate sodium (COLACE) capsule 100 mg  100 mg Oral BID  hydrOXYzine HCL (ATARAX) tablet 10 mg  10 mg Oral TID PRN  
 glucose chewable tablet 16 g  4 Tab Oral PRN  
 glucagon (GLUCAGEN) injection 1 mg  1 mg IntraMUSCular PRN  
 dextrose 10% infusion 0-250 mL  0-250 mL IntraVENous PRN  
 insulin lispro (HUMALOG) injection   SubCUTAneous AC&HS  sodium chloride (NS) flush 5-40 mL  5-40 mL IntraVENous Q8H  
 sodium chloride (NS) flush 5-40 mL  5-40 mL IntraVENous PRN  
 acetaminophen (TYLENOL) tablet 650 mg  650 mg Oral Q6H PRN Or  
 acetaminophen (TYLENOL) suppository 650 mg  650 mg Rectal Q6H PRN  polyethylene glycol (MIRALAX) packet 17 g  17 g Oral DAILY PRN  promethazine (PHENERGAN) tablet 12.5 mg  12.5 mg Oral Q6H PRN Or  
 ondansetron (ZOFRAN) injection 4 mg  4 mg IntraVENous Q6H PRN No Known Allergies Objective: 
Vitals:   
Vitals:  
 11/17/20 2317 11/18/20 0701 11/18/20 2270 11/18/20 0845 Pulse: 84 79  82 Resp: 16 16  17 Temp: 98 °F (36.7 °C) 97.8 °F (36.6 °C)  98 °F (36.7 °C) SpO2: 99% 95%  95% Weight:   128.2 kg (282 lb 10.1 oz) Height:      
 
Intake and Output: 
No intake/output data recorded. 11/16 1901 - 11/18 0700 In: 80 [P.O.:930] Out: 550 [Urine:550] Physical Examination: 
General: Morbidly obese, in NAD HEENT: PERRL, EOMI Neck: Supple,no mass palpable Lungs : R basal rales + CVS: RRR, VAD sounds Abdomen: Soft, NT, BS +, obese Extremities: no b/l LE edema Skin: No rash or lesions. MS: No joint swelling, erythema, warmth Neurologic: awake and alert [x]    High complexity decision making was performed 
[x]    Patient is at high-risk of decompensation with multiple organ involvement Lab Data Personally Reviewed: I have reviewed all the pertinent labs, microbiology data and radiology studies during assessment. Recent Labs 11/18/20 
1426 11/18/20 
0515 11/17/20 
0515 11/17/20 
0510 11/16/20 
0459 11/16/20 
4462 NA  --  140 140  --  140  --   
K  --  4.8 4.6  --  5.2*  --   
CL  --  109* 107  --  110*  --   
CO2  --  27 29  --  26  --   
GLU  --  121* 131*  --  128*  --   
BUN  --  51* 50*  --  55*  --   
CREA  --  2.58* 2.51*  --  2.67*  --   
CA  --  8.6 8.8  --  8.9  --   
MG 2.0  --  2.0  --   --  2.2 PHOS  --   --  2.5*  --   --   --   
ALB  --  3.0* 2.9*  --  3.1*  --   
ALT  --  9*  --   --  10*  --   
INR  --  2.6*  --  2.4* 2.2*  --   
 
Recent Labs 11/18/20 
9822 11/17/20 
0510 11/16/20 
5709 WBC 6.3 6.9 7.7 HGB 8.3* 8.1* 8.6* HCT 27.7* 26.4* 28.4*  
 162 174 No results found for: SDES Lab Results Component Value Date/Time Culture result: NO GROWTH 5 DAYS 11/10/2020 06:42 PM  
 Culture result: No Growth (<1000 cfu/mL) 11/09/2020 12:05 AM  
 Culture result: SCANT Morganella morganii ssp morganii (A) 10/30/2020 11:30 AM  
 Culture result: SCANT Morganella morganii ssp morganii (A) 10/30/2020 11:30 AM  
 Culture result: RARE DIPHTHEROIDS (A) 10/30/2020 11:30 AM  
 
Recent Results (from the past 24 hour(s)) GLUCOSE, POC Collection Time: 11/17/20 11:48 AM  
Result Value Ref Range Glucose (POC) 180 (H) 65 - 100 mg/dL Performed by Carrington JOHNSON GLUCOSE, POC Collection Time: 11/17/20  3:59 PM  
Result Value Ref Range Glucose (POC) 172 (H) 65 - 100 mg/dL Performed by Erik Payne GLUCOSE, POC Collection Time: 11/17/20  9:16 PM  
Result Value Ref Range Glucose (POC) 145 (H) 65 - 100 mg/dL Performed by [facility] METABOLIC PANEL, COMPREHENSIVE  
 Collection Time: 11/18/20  5:15 AM  
Result Value Ref Range Sodium 140 136 - 145 mmol/L Potassium 4.8 3.5 - 5.1 mmol/L Chloride 109 (H) 97 - 108 mmol/L  
 CO2 27 21 - 32 mmol/L Anion gap 4 (L) 5 - 15 mmol/L Glucose 121 (H) 65 - 100 mg/dL BUN 51 (H) 6 - 20 MG/DL Creatinine 2.58 (H) 0.55 - 1.02 MG/DL  
 BUN/Creatinine ratio 20 12 - 20 GFR est AA 22 (L) >60 ml/min/1.73m2 GFR est non-AA 18 (L) >60 ml/min/1.73m2 Calcium 8.6 8.5 - 10.1 MG/DL Bilirubin, total 0.4 0.2 - 1.0 MG/DL  
 ALT (SGPT) 9 (L) 12 - 78 U/L  
 AST (SGOT) 13 (L) 15 - 37 U/L Alk. phosphatase 66 45 - 117 U/L Protein, total 6.9 6.4 - 8.2 g/dL Albumin 3.0 (L) 3.5 - 5.0 g/dL Globulin 3.9 2.0 - 4.0 g/dL A-G Ratio 0.8 (L) 1.1 - 2.2 LD Collection Time: 11/18/20  5:15 AM  
Result Value Ref Range  81 - 246 U/L  
PROTHROMBIN TIME + INR Collection Time: 11/18/20  5:15 AM  
Result Value Ref Range INR 2.6 (H) 0.9 - 1.1 Prothrombin time 25.8 (H) 9.0 - 11.1 sec NT-PRO BNP Collection Time: 11/18/20  5:15 AM  
Result Value Ref Range NT pro-BNP 6,184 (H) <125 PG/ML  
LACTIC ACID Collection Time: 11/18/20  5:23 AM  
Result Value Ref Range Lactic acid 0.3 (L) 0.4 - 2.0 MMOL/L  
CBC W/O DIFF Collection Time: 11/18/20  5:23 AM  
Result Value Ref Range WBC 6.3 3.6 - 11.0 K/uL  
 RBC 2.96 (L) 3.80 - 5.20 M/uL HGB 8.3 (L) 11.5 - 16.0 g/dL HCT 27.7 (L) 35.0 - 47.0 % MCV 93.6 80.0 - 99.0 FL  
 MCH 28.0 26.0 - 34.0 PG  
 MCHC 30.0 30.0 - 36.5 g/dL  
 RDW 18.3 (H) 11.5 - 14.5 % PLATELET 015 832 - 195 K/uL NRBC 0.0 0  WBC ABSOLUTE NRBC 0.00 0.00 - 0.01 K/uL MAGNESIUM Collection Time: 11/18/20  5:23 AM  
Result Value Ref Range Magnesium 2.0 1.6 - 2.4 mg/dL PROCALCITONIN Collection Time: 11/18/20  5:25 AM  
Result Value Ref Range Procalcitonin 0.14 ng/mL GLUCOSE, POC Collection Time: 11/18/20  7:07 AM  
Result Value Ref Range Glucose (POC) 141 (H) 65 - 100 mg/dL Performed by [facility] Total time spent with patient:  xxx   min. Care Plan discussed with: 
Patient Family RN Consulting Physician [address] [facility],      
 
I have reviewed the flowsheets. Chart and Pertinent Notes have been reviewed. No change in PMH ,family and social history from Consult note.  
 
 
Robert Valenzuela MD

## 2020-11-18 NOTE — PROGRESS NOTES
[facility] VAD/Heart Failure Progress Note Admit Date: 2020 POD:  * No surgery found * Procedure:      
 
 Subjective:  
Some HTN overnight; abx's Objective:  
Vitals: 
Pulse 82, temperature 98 °F (36.7 °C), resp. rate 17, height 5' 2\" (1.575 m), weight 282 lb 10.1 oz (128.2 kg), SpO2 95 %. Temp (24hrs), Av.1 °F (36.7 °C), Min:97.8 °F (36.6 °C), Max:98.6 °F (37 °C) Hemodynamics: 
 CO:   
 CI:   
 CVP:   
 SVR:   
 PAP Systolic:   
 PAP Diastolic:   
 PVR:   
 IQ82:   
 SCV02:   
 
VAD Interrogation: LVAD (Heartmate) Pump Speed (RPM): 9400 Pump Flow (LPM): 5.2 PI (Pulsitility Index): 6 Power: 5.8 MAP: 108  Test: Yes 
Back Up  at Bedside & Labeled: Yes Power Module Test: Yes Driveline Site Care: No 
Driveline Dressing: Clean, Dry, and Intact EKG/Rhythm:   
 
Extubation Date / Time:  
 
CT Output:  
 
Ventilator: 
  
 
Oxygen Therapy: 
Oxygen Therapy O2 Sat (%): 95 % (20 08) Pulse via Oximetry: 81 beats per minute (20 1550) O2 Device: Room air (20 0845) O2 Flow Rate (L/min): 2 l/min (20 0848) CXR: 
 
Admission Weight: Last Weight Weight: 297 lb 8 oz (134.9 kg) Weight: 282 lb 10.1 oz (128.2 kg) Intake / Laurie Quiver / Clearnce Lever: 
Current Shift:  0701 -  1900 In: 240 [P.O.:240] Out: 125 [Urine:125] Last 24 hrs.:  
 
Intake/Output Summary (Last 24 hours) at 2020 1045 Last data filed at 2020 6025 Gross per 24 hour Intake 690 ml Output 475 ml Net 215 ml No results for input(s): CPK, CKMB, TROIQ in the last 72 hours. Recent Labs 20 
5095 20 
0515 20 
0515 20 
0510 20 
0459 20 
4976 NA  --  140 140  --  140  --   
K  --  4.8 4.6  --  5.2*  --   
CO2  --  27 29  --  26  --   
BUN  --  51* 50*  --  55*  --   
CREA  --  2.58* 2.51*  --  2.67*  --   
GLU  --  121* 131*  --  128*  --   
PHOS  --   --  2.5*  --   --   --   
 MG 2.0  --  2.0  --   --  2.2 WBC 6.3  --   --  6.9  --  7.7 HGB 8.3*  --   --  8.1*  --  8.6* HCT 27.7*  --   --  26.4*  --  28.4*  
  --   --  162  --  174 Recent Labs 11/18/20 
0515 11/17/20 
0510 11/16/20 
0459 INR 2.6* 2.4* 2.2* PTP 25.8* 24.3* 22.3* No lab exists for component: PBNP Current Facility-Administered Medications:  
  warfarin (COUMADIN) tablet 2.5 mg, 2.5 mg, Oral, ONCE, Yissel Rivera MD 
[address] [place] [address]  [START ON 11/19/2020] bumetanide (BUMEX) tablet 1 mg, 1 mg, Oral, DAILY, Jaspreet Mendoza MD 
  hydrALAZINE (APRESOLINE) 20 mg/mL injection 10 mg, 10 mg, IntraVENous, Q6H PRN, Triny Limon NP, 10 mg at 11/17/20 1658   hydrALAZINE (APRESOLINE) tablet 25 mg, 25 mg, Oral, TID, Chantelle Solano NP, 25 mg at 11/18/20 2884   Warfarin - Pharmacy Dosing, , Other, Rx Dosing/Monitoring, Yissel Rivera MD 
  PARoxetine (PAXIL) tablet 20 mg, 20 mg, Oral, DAILY, Albert Akhtar, DO, 20 mg at 11/18/20 0849   albuterol-ipratropium (DUO-NEB) 2.5 MG-0.5 MG/3 ML, 3 mL, Nebulization, Q6H PRN, Andi Oropeza MD, 3 mL at 11/14/20 1549   cephALEXin (KEFLEX) capsule 250 mg, 250 mg, Oral, BID, Lanette Wade MD, 250 mg at 11/18/20 7333   ranolazine ER (RANEXA) tablet 1,000 mg, 1,000 mg, Oral, BID, Amy Marion MD, 1,000 mg at 11/18/20 3644   ferrous sulfate tablet 325 mg, 325 mg, Oral, DAILY WITH BREAKFAST, Eugene KATE DO, 325 mg at 11/18/20 2109   docusate sodium (COLACE) capsule 100 mg, 100 mg, Oral, BID, Eugene KATE DO, 100 mg at 11/18/20 8412   hydrOXYzine HCL (ATARAX) tablet 10 mg, 10 mg, Oral, TID PRN, Gayla Sainz DO 
  glucose chewable tablet 16 g, 4 Tab, Oral, PRN, Eugene KATE DO 
  glucagon (GLUCAGEN) injection 1 mg, 1 mg, IntraMUSCular, PRN, Eugene KATE DO 
  dextrose 10% infusion 0-250 mL, 0-250 mL, IntraVENous, PRN, Shadi Sainz DO 
   insulin lispro (HUMALOG) injection, , SubCUTAneous, AC&HS, Cally KATE, DO, 2 Units at 11/18/20 0711 
  sodium chloride (NS) flush 5-40 mL, 5-40 mL, IntraVENous, Q8H, Александр Acevedo MD, 10 mL at 11/18/20 4965   sodium chloride (NS) flush 5-40 mL, 5-40 mL, IntraVENous, PRN, Александр Acevedo MD 
  acetaminophen (TYLENOL) tablet 650 mg, 650 mg, Oral, Q6H PRN, 650 mg at 11/17/20 2123 **OR** acetaminophen (TYLENOL) suppository 650 mg, 650 mg, Rectal, Q6H PRN, Александр Acevedo MD 
  polyethylene glycol (MIRALAX) packet 17 g, 17 g, Oral, DAILY PRN, Александр Acevedo MD 
  promethazine (PHENERGAN) tablet 12.5 mg, 12.5 mg, Oral, Q6H PRN **OR** ondansetron (ZOFRAN) injection 4 mg, 4 mg, IntraVENous, Q6H PRN, Александр Acevedo MD 
 
A/P 
  
S/P LVAD - good flows Need for anticoagulation - coumadin Sternal wound infecion - abx's Bite - abx's  
  
Risk of morbidity and mortality - high Medical decision making - high complexity 
  
 
Signed By: Adrián Way MD

## 2020-11-18 NOTE — PROGRESS NOTES
Name: [facility]: [address]  
: 1950 Admit Date: 2020 Phone:   Room: [facility]/01 PCP: Sujey Champion NP  MRN: [id number] Date: 2020  Code: Full Code HPI: 
 
 Doing ok. On room air  
1:12 PM    
 
History was obtained from patient. I was asked by Mariella Avina MD to see Beryle Baars in consultation for Trilogy. History of Present Illness: 78year old female with past medical history of NICMP (suspect to be amyloid heart disease - ATTR based on Pema PYP scan) s/p LVAD (2017 by Dr Nadia Kowalski), COPD, afib, chronic LVAD infection admitted with cellulitis of right lower leg. Pulmonary requested for trelegy. Patient has Trelegy machine. Last time seen in the [facility] office in [facility] 2018. Does not remember Trelegy settings. Initial Trelegy seems like was ordered by advanced heart failure team at [facility] (Dr Eugene Medina). Past Medical History:  
Diagnosis Date  Asthma  Cancer ([address] breast  
 Cancer ([address]   
 endometrial  
 Congestive heart failure, unspecified  CRI (chronic renal insufficiency)  Depression  Diabetes ([address]  Hypertension Past Surgical History:  
Procedure Laterality Date  HX HERNIA REPAIR    
 HX HYSTERECTOMY  HX MASTECTOMY History reviewed. No pertinent family history. Social History Tobacco Use  Smoking status: Never Smoker  Smokeless tobacco: Never Used Substance Use Topics  Alcohol use: Not Currently No Known Allergies Current Facility-Administered Medications Medication Dose Route Frequency  warfarin (COUMADIN) tablet 2.5 mg  2.5 mg Oral ONCE  
 [START ON 2020] bumetanide (BUMEX) tablet 1 mg  1 mg Oral DAILY  hydrALAZINE (APRESOLINE) 20 mg/mL injection 10 mg  10 mg IntraVENous Q6H PRN  
 hydrALAZINE (APRESOLINE) tablet 25 mg  25 mg Oral TID  Warfarin - Pharmacy Dosing   Other Rx Dosing/Monitoring  PARoxetine (PAXIL) tablet 20 mg  20 mg Oral DAILY  albuterol-ipratropium (DUO-NEB) 2.5 MG-0.5 MG/3 ML  3 mL Nebulization Q6H PRN  
 cephALEXin (KEFLEX) capsule 250 mg  250 mg Oral BID  ranolazine ER (RANEXA) tablet 1,000 mg  1,000 mg Oral BID  ferrous sulfate tablet 325 mg  325 mg Oral DAILY WITH BREAKFAST  docusate sodium (COLACE) capsule 100 mg  100 mg Oral BID  hydrOXYzine HCL (ATARAX) tablet 10 mg  10 mg Oral TID PRN  
 glucose chewable tablet 16 g  4 Tab Oral PRN  
 glucagon (GLUCAGEN) injection 1 mg  1 mg IntraMUSCular PRN  
 dextrose 10% infusion 0-250 mL  0-250 mL IntraVENous PRN  
 insulin lispro (HUMALOG) injection   SubCUTAneous AC&HS  sodium chloride (NS) flush 5-40 mL  5-40 mL IntraVENous Q8H  
 sodium chloride (NS) flush 5-40 mL  5-40 mL IntraVENous PRN  
 acetaminophen (TYLENOL) tablet 650 mg  650 mg Oral Q6H PRN Or  
 acetaminophen (TYLENOL) suppository 650 mg  650 mg Rectal Q6H PRN  polyethylene glycol (MIRALAX) packet 17 g  17 g Oral DAILY PRN  promethazine (PHENERGAN) tablet 12.5 mg  12.5 mg Oral Q6H PRN Or  
 ondansetron (ZOFRAN) injection 4 mg  4 mg IntraVENous Q6H PRN  
 
 
 
REVIEW OF SYSTEMS Negative except as stated in the HPI. Physical Exam:  
Visit Vitals Pulse 82 Temp 98 °F (36.7 °C) Resp 17 Ht 5' 2\" (1.575 m) Wt 128.2 kg (282 lb 10.1 oz) SpO2 95% BMI 51.69 kg/m² General:  Alert, cooperative, no distress, appears stated age. Head:  Normocephalic, without obvious abnormality, atraumatic. Eyes:  Conjunctivae/corneas clear. PERRL, EOMs intact. Nose: Nares normal. Septum midline. Mucosa normal. No drainage or sinus tenderness. Throat: Lips, mucosa, and tongue normal. Teeth and gums normal.  
Neck: Supple, symmetrical, trachea midline, no adenopathy, thyroid: no enlargment/tenderness/nodules, no carotid bruit and no JVD. Back:   Symmetric, no curvature. ROM normal.  
Lungs:   Clear to auscultation bilaterally. Chest wall:  No tenderness or deformity. Heart:  Regular rate and rhythm, S1, S2 normal, no murmur, click, rub or gallop. Abdomen:   Soft, non-tender. Bowel sounds normal. No masses,  No organomegaly. Extremities: Extremities normal, atraumatic, no cyanosis or edema. Pulses: 2+ and symmetric all extremities. Skin: Skin color, texture, turgor normal. No rashes or lesions Lymph nodes: Cervical, supraclavicular, and axillary nodes normal.  
Neurologic: Grossly nonfocal  
 
 
Lab Results Component Value Date/Time Sodium 140 11/18/2020 05:15 AM  
 Potassium 4.8 11/18/2020 05:15 AM  
 Chloride 109 (H) 11/18/2020 05:15 AM  
 CO2 27 11/18/2020 05:15 AM  
 BUN 51 (H) 11/18/2020 05:15 AM  
 Creatinine 2.58 (H) 11/18/2020 05:15 AM  
 Glucose 121 (H) 11/18/2020 05:15 AM  
 Calcium 8.6 11/18/2020 05:15 AM  
 Magnesium 2.0 11/18/2020 05:23 AM  
 Phosphorus 2.5 (L) 11/17/2020 05:15 AM  
 Lactic acid 0.3 (L) 11/18/2020 05:23 AM  
 
 
Lab Results Component Value Date/Time WBC 6.3 11/18/2020 05:23 AM  
 HGB 8.3 (L) 11/18/2020 05:23 AM  
 PLATELET 412 33/72/5621 05:23 AM  
 MCV 93.6 11/18/2020 05:23 AM  
 
 
Lab Results Component Value Date/Time INR 2.6 (H) 11/18/2020 05:15 AM  
 aPTT 46.2 (H) 11/09/2020 06:19 PM  
 Alk. phosphatase 66 11/18/2020 05:15 AM  
 Protein, total 6.9 11/18/2020 05:15 AM  
 Albumin 3.0 (L) 11/18/2020 05:15 AM  
 Globulin 3.9 11/18/2020 05:15 AM  
 
 
Lab Results Component Value Date/Time Iron 25 (L) 11/12/2020 02:46 PM  
 TIBC 197 (L) 11/12/2020 02:46 PM  
 Iron % saturation 13 (L) 11/12/2020 02:46 PM  
 Ferritin 431 (H) 11/12/2020 05:44 AM  
 
 
Lab Results Component Value Date/Time Sed rate (ESR) 55 (H) 10/01/2020 12:00 AM  
 TSH 2.980 10/01/2020 12:00 AM  
  
 
No results found for: PH, PHI, PCO2, PCO2I, PO2, PO2I, HCO3, HCO3I, FIO2, FIO2I Lab Results Component Value Date/Time CK 70 11/04/2020 05:49 AM  
  
 
Lab Results Component Value Date/Time Culture result: NO GROWTH 5 DAYS 11/10/2020 06:42 PM  
 Culture result: No Growth (<1000 cfu/mL) 11/09/2020 12:05 AM  
 Culture result: SCANT Morganella morganii ssp morganii (A) 10/30/2020 11:30 AM  
 Culture result: SCANT Morganella morganii ssp morganii (A) 10/30/2020 11:30 AM  
 Culture result: RARE DIPHTHEROIDS (A) 10/30/2020 11:30 AM  
 
 
No results found for: TOXA1, RPR, HBCM, HBSAG, HAAB, HCAB1, HAAT, G6PD, CRYAC, HIVGT, HIVR, HIV1, HIV12, HIVPC, HIVRPI Lab Results Component Value Date/Time Vancomycin,trough 26.6 (HH) 10/31/2020 05:18 AM  
 CK 70 11/04/2020 05:49 AM  
  11/02/2020 03:42 AM  
 
 
Lab Results Component Value Date/Time Color Yellow/Straw 11/09/2020 12:05 AM  
 Appearance Turbid (A) 11/09/2020 12:05 AM  
 Specific gravity 1.013 11/09/2020 12:05 AM  
 pH (UA) 5.0 11/09/2020 12:05 AM  
 Protein 100 (A) 11/09/2020 12:05 AM  
 Glucose Negative 11/09/2020 12:05 AM  
 Ketone Negative 11/09/2020 12:05 AM  
 Bilirubin Negative 11/09/2020 12:05 AM  
 Blood Moderate (A) 11/09/2020 12:05 AM  
 Urobilinogen 0.1 11/09/2020 12:05 AM  
 Nitrites Negative 11/09/2020 12:05 AM  
 Leukocyte Esterase Trace (A) 11/09/2020 12:05 AM  
 WBC 0-5 11/09/2020 12:05 AM  
 RBC 0-5 11/09/2020 12:05 AM  
 Bacteria Negative 11/09/2020 12:05 AM  
 
 
IMPRESSION: 
=========== 
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 -Chronic respiratory failure: on trilogy as out patient. 
-Asthma. 
-afib 
-DM2 
-Hx of Breast cancer. PLAN: 
===== 
-Trilogy provided by [facility]. Will refer to Dr Phuong Bell [facility]. [facility]. -we will be available again to see if needed Thomas Paulino PA-C

## 2020-11-18 NOTE — PROGRESS NOTES
[facility] VAD/Heart Failure Progress Note Admit Date: 2020 POD:  * No surgery found * Procedure:      
 
 Subjective:  
Tremors improved; abx's Objective:  
Vitals: 
Pulse 85, temperature 97.7 °F (36.5 °C), resp. rate 19, height 5' 2\" (1.575 m), weight 282 lb 10.1 oz (128.2 kg), SpO2 99 %. Temp (24hrs), Av.9 °F (36.6 °C), Min:97.7 °F (36.5 °C), Max:98.1 °F (36.7 °C) Hemodynamics: 
 CO:   
 CI:   
 CVP:   
 SVR:   
 PAP Systolic:   
 PAP Diastolic:   
 PVR:   
 DT76:   
 SCV02:   
 
VAD Interrogation: LVAD (Heartmate) Pump Speed (RPM): 9400 Pump Flow (LPM): 4.9 PI (Pulsitility Index): 6.5 Power: 5.7 MAP: 106  Test: No 
Back Up  at Bedside & Labeled: Yes Power Module Test: No 
Driveline Site Care: No 
Driveline Dressing: Clean, Dry, and Intact EKG/Rhythm:   
 
Extubation Date / Time:  
 
CT Output:  
 
Ventilator: 
  
 
Oxygen Therapy: 
Oxygen Therapy O2 Sat (%): 99 % (20 1549) Pulse via Oximetry: 81 beats per minute (20 1550) O2 Device: Room air (20 1215) O2 Flow Rate (L/min): 2 l/min (20 0848) CXR: 
 
Admission Weight: Last Weight Weight: 297 lb 8 oz (134.9 kg) Weight: 282 lb 10.1 oz (128.2 kg) Intake / [place] / Rayne Ishaan: 
Current Shift:  0701 -  1900 In: 480 [P.O.:480] Out: 175 [Urine:175] Last 24 hrs.:  
 
Intake/Output Summary (Last 24 hours) at 2020 1757 Last data filed at 2020 1215 Gross per 24 hour Intake 680 ml Output 275 ml Net 405 ml No results for input(s): CPK, CKMB, TROIQ in the last 72 hours. Recent Labs 20 
3150 20 
0515 20 
0515 20 
0510 20 
0459 20 
5100 NA  --  140 140  --  140  --   
K  --  4.8 4.6  --  5.2*  --   
CO2  --  27 29  --  26  --   
BUN  --  51* 50*  --  55*  --   
CREA  --  2.58* 2.51*  --  2.67*  --   
GLU  --  121* 131*  --  128*  --   
PHOS  --   --  2.5*  --   --   --   
 MG 2.0  --  2.0  --   --  2.2 WBC 6.3  --   --  6.9  --  7.7 HGB 8.3*  --   --  8.1*  --  8.6* HCT 27.7*  --   --  26.4*  --  28.4*  
  --   --  162  --  174 Recent Labs 11/18/20 
0515 11/17/20 
0510 11/16/20 
0459 INR 2.6* 2.4* 2.2* PTP 25.8* 24.3* 22.3* No lab exists for component: PBNP Current Facility-Administered Medications:  
  [START ON 11/19/2020] bumetanide (BUMEX) tablet 1 mg, 1 mg, Oral, DAILY, Jaspreet Mendoza MD 
  hydrALAZINE (APRESOLINE) 20 mg/mL injection 10 mg, 10 mg, IntraVENous, Q6H PRN, Triny Limon NP, 10 mg at 11/18/20 1753   hydrALAZINE (APRESOLINE) tablet 25 mg, 25 mg, Oral, TID, Jannette Ornelas NP, 25 mg at 11/18/20 1620   Warfarin - Pharmacy Dosing, , Other, Rx Dosing/Monitoring, Sisi Ahmadi MD 
  PARoxetine (PAXIL) tablet 20 mg, 20 mg, Oral, DAILY, Albert Akhtar DO, 20 mg at 11/18/20 6410   albuterol-ipratropium (DUO-NEB) 2.5 MG-0.5 MG/3 ML, 3 mL, Nebulization, Q6H PRN, Damion Lovell MD, 3 mL at 11/14/20 1549   cephALEXin (KEFLEX) capsule 250 mg, 250 mg, Oral, BID, Jana Gutierrez MD, 250 mg at 11/18/20 1724 
  ranolazine ER (RANEXA) tablet 1,000 mg, 1,000 mg, Oral, BID, Lucila Peace MD, 1,000 mg at 11/18/20 5382   ferrous sulfate tablet 325 mg, 325 mg, Oral, DAILY WITH BREAKFAST, Vik KATE DO, 325 mg at 11/18/20 7250   docusate sodium (COLACE) capsule 100 mg, 100 mg, Oral, BID, Vik Tran M, DO, 100 mg at 11/18/20 1724   hydrOXYzine HCL (ATARAX) tablet 10 mg, 10 mg, Oral, TID PRN, Tim Conway, [employer] M, DO 
  glucose chewable tablet 16 g, 4 Tab, Oral, PRN, Maxmiguel Tran M, DO 
  glucagon (GLUCAGEN) injection 1 mg, 1 mg, IntraMUSCular, PRN, Vik KATE, DO 
  dextrose 10% infusion 0-250 mL, 0-250 mL, IntraVENous, PRN, Tim Conway, [employer] M, DO 
  insulin lispro (HUMALOG) injection, , SubCUTAneous, AC&HS, Vik KATE, DO, 2 Units at 11/18/20 1724   sodium chloride (NS) flush 5-40 mL, 5-40 mL, IntraVENous, Q8H, Leslie Saleh MD, 10 mL at 11/18/20 1620 
  sodium chloride (NS) flush 5-40 mL, 5-40 mL, IntraVENous, PRN, Leslie Saleh MD 
  acetaminophen (TYLENOL) tablet 650 mg, 650 mg, Oral, Q6H PRN, 650 mg at 11/17/20 2123 **OR** acetaminophen (TYLENOL) suppository 650 mg, 650 mg, Rectal, Q6H PRN, Leslie Saleh MD 
  polyethylene glycol (MIRALAX) packet 17 g, 17 g, Oral, DAILY PRN, Leslie Saleh MD 
  promethazine (PHENERGAN) tablet 12.5 mg, 12.5 mg, Oral, Q6H PRN **OR** ondansetron (ZOFRAN) injection 4 mg, 4 mg, IntraVENous, Q6H PRN, Leslie Saleh MD 
 
A/P 
  
S/P LVAD - good flows Need for anticoagulation - coumadin Sternal wound infecion - abx's Bite - abx's  
  
Risk of morbidity and mortality - high Medical decision making - high complexity Signed By: Shayan Quiroz MD

## 2020-11-18 NOTE — PROGRESS NOTES
Problem: Mobility Impaired (Adult and Pediatric) Goal: *Acute Goals and Plan of Care (Insert Text) Description: FUNCTIONAL STATUS PRIOR TO ADMISSION: Patient was modified independent using a rollator for functional mobility. HOME SUPPORT PRIOR TO ADMISSION: The patient lived alone with daughter available to provide assistance. Physical Therapy Goals Initiated 11/13/2020 1. Patient will move from supine to sit and sit to supine , scoot up and down, and roll side to side in bed with modified independence within 7 day(s). 2.  Patient will transfer from bed to chair and chair to bed with modified independence using the least restrictive device within 7 day(s). 3.  Patient will perform sit to stand with modified independence within 7 day(s). 4.  Patient will ambulate with modified independence for 150 feet with the least restrictive device within 7 day(s). Outcome: Progressing Towards Goal 
 
PHYSICAL THERAPY TREATMENT Patient: Kanu Barger (47 y.o. female) Date: 11/18/2020 Diagnosis: Anemia [D64.9] ROCIO (acute kidney injury) ([facility] [address] [N17.9] <principal problem not specified> Precautions: Fall Chart, physical therapy assessment, plan of care and goals were reviewed. ASSESSMENT Patient continues with skilled PT services and is progressing towards goals. Pt in the chair on arrival agreeable to mobilize. Pt had better coordination with change over with improved recall. Pt was able to increase gait tolerance but with noticeable fatigue. 's with gait. Pt is improving but could continue to benefit from inpt rehab to progress activity tolerance, strength and mobility. No tremors noted Current Level of Function Impacting Discharge (mobility/balance): Duy Other factors to consider for discharge: lives alone, has a supportive daughter, fall history.   
    
 
PLAN : 
Patient continues to benefit from skilled intervention to address the above impairments. Continue treatment per established plan of care. to address goals. Recommendation for discharge: (in order for the patient to meet his/her long term goals) Therapy 3 hours per day 5-7 days per week This discharge recommendation: 
Has not yet been discussed the attending provider and/or case management IF patient discharges home will need the following DME: needs a replacement rollator. Pt's daughter is aware SUBJECTIVE:  
Patient stated I am loveable .  OBJECTIVE DATA SUMMARY:  
 
 
Critical Behavior: 
Neurologic State: Alert Orientation Level: Oriented X4 Cognition: Follows commands Safety/Judgement: Decreased awareness of need for assistance, Decreased awareness of need for safety Functional Mobility Training: 
Bed Mobility: 
  
  
  
  
  
  
Transfers: 
Sit to Stand: Contact guard assistance Stand to Sit: Contact guard assistance Balance: 
Sitting: Intact; With support Sitting - Static: Good (unsupported) Sitting - Dynamic: Good (unsupported) Standing: Impaired; With support Standing - Static: Good;Fair 
Standing - Dynamic : Fair;Constant support Ambulation/Gait Training: 
Distance (ft): 90 Feet (ft) Assistive Device: Gait belt;Walker, rollator Ambulation - Level of Assistance: Contact guard assistance;Minimal assistance Gait Abnormalities: Decreased step clearance Base of Support: Widened Step Length: Left shortened;Right shortened Stairs: VAD power transition Patient performed switchover from power module to battery. Completed the following tasks: 
 Independent Verbal cues Physical assist Comments Don holster vest      
Check batteries x Check  Ensure  clipped onto stabilization belt   x Pt utilizing Bum Bag Position batteries in clips x Disconnect power leads x Insert power lead into battery x [place] batteries in holster  x Secure batteries with velcro and clips x Pain Rating: No complaints Activity Tolerance:  
requires frequent rest breaks After treatment patient left in no apparent distress:  
Sitting in chair and Call bell within reach COMMUNICATION/COLLABORATION:  
The patients plan of care was discussed with: Occupational therapist and Registered nurse. Ricky Rosario PTA Time Calculation: 32 mins

## 2020-11-18 NOTE — PROGRESS NOTES
0730: Bedside shift change report given to [employer] (oncoming nurse) by Parker Mario (offgoing nurse). Report included the following information SBAR, Kardex, Intake/Output, MAR, Accordion, Recent Results and Cardiac Rhythm paced. 1025: Spoke with Dr. Juan Jose Ernst with pulmonolgoy. Stating that he would prefer patient continue to use Trelegy and not thgge V60 bipap that would be available at [facility]. 1620: Spoke with HF regarding MAP of 106. Stated to give hydralazine ordered and follow up if not improved. 1630: LVAD dressing changed with patient's sterile dressing change kit, per patient preference, using sterile technique. . Small amount of drainage at site, fully cleaned during dressing change. Some redness at site around driveline. 1750: MAP not improved after PO hydralazine. Administering prn hydralazine. 1830: MAP 90. 
 
1930: Bedside shift change report given to Parker Mario (oncoming nurse) by Fam Chacon RN (offgoing nurse). Report included the following information SBAR, Kardex, Intake/Output, MAR, Accordion, Recent Results and Cardiac Rhythm paced. Problem: Falls - Risk of 
Goal: *Absence of Falls Description: Document Eladio Prado Fall Risk and appropriate interventions in the flowsheet. Outcome: Progressing Towards Goal 
Note: Fall Risk Interventions: 
Mobility Interventions: Communicate number of staff needed for ambulation/transfer, OT consult for ADLs, Patient to call before getting OOB, PT Consult for mobility concerns, PT Consult for assist device competence, Strengthening exercises (ROM-active/passive), Utilize walker, cane, or other assistive device Medication Interventions: Evaluate medications/consider consulting pharmacy, Patient to call before getting OOB, Teach patient to arise slowly, Utilize gait belt for transfers/ambulation Elimination Interventions: Call light in reach, Patient to call for help with toileting needs, Stay With Me (per policy), Toilet paper/wipes in reach History of Falls Interventions: Consult care management for discharge planning, Evaluate medications/consider consulting pharmacy, Door open when patient unattended, Utilize gait belt for transfer/ambulation Problem: Patient Education: Go to Patient Education Activity Goal: Patient/Family Education Outcome: Progressing Towards Goal 
  
Problem: Pressure Injury - Risk of 
Goal: *Prevention of pressure injury Description: Document Jaime Scale and appropriate interventions in the flowsheet. Outcome: Progressing Towards Goal 
Note: Pressure Injury Interventions: 
Sensory Interventions: Assess changes in LOC, Assess need for specialty bed, Check visual cues for pain, Keep linens dry and wrinkle-free, Maintain/enhance activity level, Pressure redistribution bed/mattress (bed type) Moisture Interventions: Absorbent underpads, Assess need for specialty bed, Apply protective barrier, creams and emollients, Internal/External urinary devices, Maintain skin hydration (lotion/cream) Activity Interventions: Assess need for specialty bed, Increase time out of bed, Pressure redistribution bed/mattress(bed type), PT/OT evaluation Mobility Interventions: Assess need for specialty bed, Pressure redistribution bed/mattress (bed type), PT/OT evaluation Nutrition Interventions: Document food/fluid/supplement intake, Discuss nutritional consult with provider, Offer support with meals,snacks and hydration Friction and Shear Interventions: Apply protective barrier, creams and emollients, Foam dressings/transparent film/skin sealants, Lift sheet, Lift team/patient mobility team 
 
  
 
 
 
  
Problem: Patient Education: Go to Patient Education Activity Goal: Patient/Family Education Outcome: Progressing Towards Goal 
  
Problem: Discharge Planning Goal: *Discharge to safe environment Outcome: Progressing Towards Goal 
  
 Problem: Heart Failure: Day 5 Goal: Off Pathway (Use only if patient is Off Pathway) Outcome: Progressing Towards Goal 
Goal: Activity/Safety Outcome: Progressing Towards Goal 
Goal: Diagnostic Test/Procedures Outcome: Progressing Towards Goal 
Goal: Nutrition/Diet Outcome: Progressing Towards Goal 
Goal: Discharge Planning Outcome: Progressing Towards Goal 
Goal: Medications Outcome: Progressing Towards Goal 
Goal: Respiratory Outcome: Progressing Towards Goal 
Goal: Treatments/Interventions/Procedures Outcome: Progressing Towards Goal 
Goal: Psychosocial 
Outcome: Progressing Towards Goal

## 2020-11-18 NOTE — TELEPHONE ENCOUNTER
Genetic testing ordered. Sample and order shipped by [employer] to [facility].  Jocelin Ghosh, RN

## 2020-11-18 NOTE — PROGRESS NOTES
Problem: Self Care Deficits Care Plan (Adult) Goal: *Acute Goals and Plan of Care (Insert Text) Description: FUNCTIONAL STATUS PRIOR TO ADMISSION: Patient was modified independent using a rollator for functional mobility. Patient with recent hospital admission with discharge home however returned s/p GLF with left leg wound. HOME SUPPORT: The patient lived alone with daughter to provide assistance occasionally, however daughter is caring for her baby and is looking into hiring caregivers for patient per  note. Occupational Therapy Goals Initiated 11/13/2020 1. Patient will perform ADLs standing 5 mins without fatigue or LOB with contact guard assistance within 5 day(s). 2.  Patient will perform lower body ADLs with supervision/set-up within 5 day(s) using AE PRN. 3.  Patient will perform sponge bathing x supervision/set-up within 5 day(s). 4.  Patient will perform toilet transfers with contact guard assistance within 5 day(s). 5.  Patient will perform all aspects of toileting with contact guard assistance within 7 day(s). 6.  Patient will complete LVAD switchover from wall <> portable battery pack with independence within 7 day(s) Outcome: Progressing Towards Goal 
 OCCUPATIONAL THERAPY TREATMENT Patient: Fam Jordan (86 y.o. female) Date: 11/18/2020 Diagnosis: Anemia [D64.9] ROCIO (acute kidney injury) ([facility] [address] [N17.9] <principal problem not specified> Precautions: Fall Chart, occupational therapy assessment, plan of care, and goals were reviewed. ASSESSMENT Patient continues with skilled OT services and is progressing towards goals. Patient A&Ox4 today and seemingly more energetic than previous sessions. Patient required <2 verbal cues for sequencing through LVAD switchover today and required minimal assistance for managing LVAD waistbag.  Patient also with improved cardiopulmonary tolerance demonstrating ability to utilize rollator for functional mobility with hospital room distances and activity tolerance for grooming tasks standing at the sink with standby assistance. Patient continues to require largely CGA for transfers and HR ranged from 90s-130s in session. Continue to recommend IPR if triology issue can be resolved; if unable to discharge to rehab facility, [place] [facility] [place] caregiver for assistance with ADL and IADL tasks as needed for safety. Current Level of Function Impacting Discharge (ADLs): MIN A UB dressing (with A for LVAD waistbag); Other factors to consider for discharge: LVAD management (namely safety with driveline/ and battery exchange); endurance; activity tolerance PLAN : 
Patient continues to benefit from skilled intervention to address the above impairments. Continue treatment per established plan of care. to address goals. Recommend with staff: OOB x 3 to chair; A x 2 for safety Recommend next OT session: UB dressing with no verbal cues; toileting tasks Recommendation for discharge: (in order for the patient to meet his/her long term goals) Therapy 3 hours per day 5-7 days per week; if unable, patient will need HHOT and a caregiver daily for assistance with ADL and IADL tasks as needed This discharge recommendation: 
Has been made in collaboration with the attending provider and/or case management IF patient discharges home will need the following DME: spoke to daughter about purchasing new rollator (brakes broken) SUBJECTIVE:  
Patient stated His name is Laquita Portillo (regarding grandchild).  OBJECTIVE DATA SUMMARY:  
Cognitive/Behavioral Status: 
Neurologic State: Alert Orientation Level: Oriented X4 Cognition: Follows commands Perception: Appears intact Perseveration: No perseveration noted Safety/Judgement: Decreased awareness of need for assistance;Decreased awareness of need for safety Functional Mobility and Transfers for ADLs: 
Bed Mobility: Not observed this session. Transfers: 
Sit to Stand: Contact guard assistance Balance: 
Sitting: Intact; With support Sitting - Static: Good (unsupported) Sitting - Dynamic: Good (unsupported) Standing: Impaired; With support Standing - Static: Good;Fair 
Standing - Dynamic : Fair;Constant support ADL Intervention: 
  
 
Grooming Brushing Teeth: Stand-by assistance(standing at the sink) Upper Body Dressing Assistance Dressing Assistance: Minimum assistance(for management of LVAD waistbag) Cognitive Retraining Safety/Judgement: Decreased awareness of need for assistance;Decreased awareness of need for safety Activity Tolerance:  
Fair, requires rest breaks, and elevated HR with activity (90s-130s this session) After treatment patient left in no apparent distress:  
Sitting in chair and Call bell within reach COMMUNICATION/COLLABORATION:  
The patients plan of care was discussed with: Physical therapist and Registered nurse. Ely Crockett Time Calculation: 33 mins

## 2020-11-18 NOTE — PROGRESS NOTES
ID Progress Note 2020 Subjective:  
 
Sitting up in the chair, denies any discomfort. Review of Systems: 
         
Symptom Y/N Comments   Symptom Y/N Comments Fever/Chills  n     Chest Pain n      
Poor Appetite       Edema Cough       Abdominal Pain Sputum       Joint Pain n     
SOB/HAIRSTON  n     Pruritis/Rash  y     
Nausea/vomit  n     Tolerating PT/OT Diarrhea  n     Tolerating Diet Constipation  n     Other Could NOT obtain due to:   
 
Objective:  
 
Vitals:  
Visit Vitals Pulse 87 Temp 98.1 °F (36.7 °C) Resp 18 Ht 5' 2\" (1.575 m) Wt 128.2 kg (282 lb 10.1 oz) SpO2 95% BMI 51.69 kg/m² Tmax:  Temp (24hrs), Av.1 °F (36.7 °C), Min:97.8 °F (36.6 °C), Max:98.6 °F (37 °C) PHYSICAL EXAM: 
General: Chronically ill appearing. WD, WN. Alert, cooperative, no acute distress EENT:  EOMI. Anicteric sclerae. MMM Resp:  Clear in apex with decreased breath sounds at bases, no wheezing or rales. No accessory muscle use CV:  Regular  rhythm,  1+ edema GI:  Soft, distended, Non tender. +Bowel sounds Neurologic:  Alert and orient x 4 Psych:   Fair insight. Not anxious nor agitated Skin:  No rashes. No jaundice Sternal wound; base of q-tip size, scabbed over, healed. no drainage Right anterior lower extremity wound; incision healed Left anterior low extremity wound; dressing dry and intact Sternal wound; base of q-tip size, scabbed over, no drainage Labs:  
Lab Results Component Value Date/Time WBC 6.3 2020 05:23 AM  
 HGB 8.3 (L) 2020 05:23 AM  
 HCT 27.7 (L) 2020 05:23 AM  
 PLATELET 199  05:23 AM  
 MCV 93.6 2020 05:23 AM  
 
Lab Results Component Value Date/Time  Sodium 140 2020 05:15 AM  
 Potassium 4.8 2020 05:15 AM  
 Chloride 109 (H) 11/18/2020 05:15 AM  
 CO2 27 11/18/2020 05:15 AM  
 Anion gap 4 (L) 11/18/2020 05:15 AM  
 Glucose 121 (H) 11/18/2020 05:15 AM  
 BUN 51 (H) 11/18/2020 05:15 AM  
 Creatinine 2.58 (H) 11/18/2020 05:15 AM  
 BUN/Creatinine ratio 20 11/18/2020 05:15 AM  
 GFR est AA 22 (L) 11/18/2020 05:15 AM  
 GFR est non-AA 18 (L) 11/18/2020 05:15 AM  
 Calcium 8.6 11/18/2020 05:15 AM  
 Bilirubin, total 0.4 11/18/2020 05:15 AM  
 Alk. phosphatase 66 11/18/2020 05:15 AM  
 Protein, total 6.9 11/18/2020 05:15 AM  
 Albumin 3.0 (L) 11/18/2020 05:15 AM  
 Globulin 3.9 11/18/2020 05:15 AM  
 A-G Ratio 0.8 (L) 11/18/2020 05:15 AM  
 ALT (SGPT) 9 (L) 11/18/2020 05:15 AM  
 
 CT of ABD/PEL: Minimal subcutaneous fat changes right lateral abdominal wall. This may 
represent benign dependent changes if the patient lays on her right. A mild 
contusion could also give this appearance Assessment and Plan Metabolic encephalopathy (resolved) - Head CT: No acute abnormality identified. Incidentally noted is a 1.1 cm meningioma. Pt is alert and orient x 4 Sternal wound, lower extremity infection Venous stasis 
- normal WBC, afebrile. Urine cx (-) Blood cx (11/10) no growth so far CXR (-) Debridement done at bedside on 11/10 by Dr. Pushpa Mckeon, may need further surgical debridement later, but not concerned with infection in left anterior lower extremity wound at this time. Received IV cefepime while she is hospitalization. No concerns of active infection at this time Please resume keflex upon discharge as suppressive therapy; agree with 250mg twice a day, CrCl 26. 8mL/min now. The dose can increase to 500mg twice a day as renal function continue to improve. Discussed with Ms. Marilyn Tate, heart failure practice NP. 
  
LVAD/ICD Acute on chronic systolic heart failure - cardiology following, s/p cardiac cath 11/3 
  EF 15-20% 
  
Type II DM with neuropathy - A1C 8.4. continue with insulin therapy   Continue with Neurontin 
  
ROCIO on CKD 
- creat 4.6->3.0->2. 5 Avoid nephrotoxic agents, nephrology following Pt was seen today to address on suppressive therapy with the team. 
 
ID signing off. Please contact us with any questions or concerns.   
 
Oly Steward, TEA

## 2020-11-19 NOTE — PROGRESS NOTES
[address] [facility] Adult  Hospitalist Group Hospitalist Progress Note Bjorn Verduzco MD 
Answering service: [phone] or [phone] from in house phone Date of Service:  2020 NAME:  Deanna Fontenot :  1950 MRN:  [id number] Admission Summary:  
78 y.o. female with past medical history significant for chronic systolic heart failure, ischemic cardiomyopathy with LVAD in place, hypertension, diabetes mellitus type 2 presented emergency room with increased fatigue and fall. Patient was recently admitted to the hospital and discharged on 2020 for similar hospital after being treated for cellulitis of the right lower extremity. Daughter states that since he was discharged patient has been more fatigue and having involuntary movement of her body. During that hospital stay she was started on 2 new medications including Levaquin and mexiletine. Daughter said that yesterday she fell at home injuring her left leg. She has an open wound and to the emergency room. She states that she had wound packed and sent home. Today daughter noticed that the wound started bleeding significantly for which she came to the emergency room. In the emergency room her work-up showed a hemoglobin of 6.9 down from8. As well she was found to have with worsening renal function. Admission was requested for ROCIO and anemia. In the ed wound was significantly bleeding. ED physician stopped bleeding with quick clot and its currently wrapped.  
  
 
Interval history / Subjective:  
Patient seen and examined  F/U for Acute on chronic CHF, renal failure No complaints today, denies any SOB. Sharon Khan to leave the hospital. David Mayst me today she does not want to go to SNF or [facility], but wants to go home.    
 
Assessment & Plan:  
 
Chronic systolic heart failure NYHA class IV, ischemic cardiomyopathy status post LVAD Continue with Coreg 12.5 mg twice daily. On bumex 1mg daily INR stable, continue anticoagulation Not on Entresto ACE inhibitor, ARB is due to CKD 
-Advanced heart failure following 
-Nuclear scan showed cardiac amyloidosis,hematology consulted. Acute on CKD stage 3: stable 
-ATN secondary to hypovolemia due to bleed 
-nephrology and Advanced heart failure following   
-she is on oral bumex 1 mg - Will need weekly labs post DC Acute on chronic anemia: Likely secondary to bleed from leg wound -s/p 3 unit pRBCs 
-no other source of bleeding identified  
-s/p general surgery consult with bedside debridement 11/10 
-Xray negative for fracture Completed course of  cefepime-  switch to keflex  
-Hb stable. Right leg wound, s/p fall prior to [facility] NSVT: improved 
-previously on mexiltine but patient reported nausea/GERD 
-EP consulted-continue  Ranexa Chronic LVAD infection/Sternal wound: Staph and [place]ganella  
-ID consulted, resumed keflex, and should continue at home.  
  
Diabetes mellitus type 2: A1c 8.4% Sliding scale insulin before meals and at bedtime,blood glucose controlled 
  
COPD: stable-Continue maintenance inhaled steroids. Bronchodilators as needed. Depression: stable.  paxil dose decreased Morbid obesity: Body mass index is 46.34 kg/m².  OP management Metabolic encephalopathy:resolved Involuntary movements: resolved -appreciate neurology input 
-gabapentin stopped and paxil dose reduced Hyperkalemia: resolved, due to ROCIO Code status: full DVT prophylaxis: theurapeutic INR Care Plan discussed with: Patient/Family Anticipated Disposition: HH vs. SNF, need to discuss with daughter tomorrow Anticipated Discharge: Greater than 48 hours, Hospital Problems  Date Reviewed: 10/28/2020 Codes Class Noted POA Coagulopathy ([facility] [address] ICD-10-CM: H95.3 ICD-9-CM: 286.9  11/10/2020 Unknown Open wound of left lower leg ICD-10-CM: D56.972Z ICD-9-CM: 891.0  11/10/2020 Unknown Anemia ICD-10-CM: D64.9 ICD-9-CM: 285.9  11/9/2020 Unknown ROCIO (acute kidney injury) ([facility] ICD-10-CM: N17.9 ICD-9-CM: 584.9  11/9/2020 Unknown NICM (nonischemic cardiomyopathy) ([facility] ICD-10-CM: I42.8 ICD-9-CM: 425.4  11/4/2020 Yes Chronic venous insufficiency ICD-10-CM: I87.2 ICD-9-CM: 459.81  11/4/2020 Yes Acute on chronic systolic CHF (congestive heart failure) ([facility]) ICD-10-CM: H43.06 ICD-9-CM: 428.23, 428.0  10/27/2020 Yes Obesity, morbid ([facility] ICD-10-CM: E66.01 
ICD-9-CM: 278.01  10/1/2020 Yes Review of Systems:  
Negative unless stated above Vital Signs:  
 Last 24hrs VS reviewed since prior progress note. Most recent are: 
Visit Vitals Pulse 89 Temp 98.2 °F (36.8 °C) Resp 18 Ht 5' 2\" (1.575 m) Wt 128.8 kg (283 lb 15.2 oz) SpO2 95% BMI 51.94 kg/m² Intake/Output Summary (Last 24 hours) at 11/19/2020 1600 Last data filed at 11/18/2020 2102 Gross per 24 hour Intake 462 ml Output 300 ml Net 162 ml Physical Examination:  
 
Seen and examined on 11/19 Constitutional:  no acute distress ENT:  Oral mucosa moist, oropharynx benign. Resp:  CTA bilaterally. No wheezing,no accessory muscle use CV:  LVAD hum GI:  Soft, non distended, obese, non tender. Musculoskeletal:  No edema, left leg wrapped, reviewed open wound pictures Neurologic:  alert and oriented X 3,moving all extremities, answering all questions appropriately Skin: left leg skin tear, dry dressing on wound Data Review:  
 Review and/or order of clinical lab test 
 
Review and/or order of tests in the medicine section of CPT Labs:  
 
Recent Labs 11/19/20 
2896 11/18/20 
1968 WBC 6.2 6.3 HGB 8.4* 8.3* HCT 28.0* 27.7*  
 167 Recent Labs 11/19/20 
5751 11/18/20 
1358 11/18/20 
0523 11/18/20 
0515 11/17/20 
0515   --   --  140 140  
K 4.7  --   --  4.8 4.6 *  --   --  109* 107 CO2 27  --   --  27 29 BUN 52*  --   --  51* 50* CREA 2.57*  --   --  2.58* 2.51* *  --   --  121* 131* CA 8.9  --   --  8.6 8.8 MG 1.9  --  2.0  --  2.0 PHOS  --   --   --   --  2.5* URICA  --  8.2*  --   --   --   
 
Recent Labs 11/19/20 
8716 11/18/20 
0515 11/17/20 
0515 ALT 8* 9*  --   
AP 65 66  --   
TBILI 0.4 0.4  --   
TP 7.3 6.9  --   
ALB 3.1* 3.0* 2.9*  
GLOB 4.2* 3.9  --   
 
Recent Labs 11/19/20 
0290 11/18/20 
0515 11/17/20 
0510 INR 2.7* 2.6* 2.4* PTP 27.4* 25.8* 24.3* Recent Labs 11/18/20 
1358 TIBC 236* PSAT 20 FERR 411* Lab Results Component Value Date/Time Folate 10.0 10/28/2020 03:56 AM  
  
No results for input(s): PH, PCO2, PO2 in the last 72 hours. No results for input(s): CPK, CKNDX, TROIQ in the last 72 hours. No lab exists for component: CPKMB Lab Results Component Value Date/Time Cholesterol, total 192 10/01/2020 12:00 AM  
 HDL Cholesterol 29 (L) 10/01/2020 12:00 AM  
 LDL Chol Calc (NIH) 123 (H) 10/01/2020 12:00 AM  
 Triglyceride 222 (H) 10/01/2020 12:00 AM  
 
Lab Results Component Value Date/Time Glucose (POC) 176 (H) 11/19/2020 12:00 PM  
 Glucose (POC) 134 (H) 11/19/2020 07:15 AM  
 Glucose (POC) 129 (H) 11/18/2020 11:01 PM  
 Glucose (POC) 144 (H) 11/18/2020 04:19 PM  
 Glucose (POC) 158 (H) 11/18/2020 11:57 AM  
 
Lab Results Component Value Date/Time  Color Yellow/Straw 11/09/2020 12:05 AM  
 Appearance Turbid (A) 11/09/2020 12:05 AM  
 Specific gravity 1.013 11/09/2020 12:05 AM  
 pH (UA) 5.0 11/09/2020 12:05 AM  
 Protein 100 (A) 11/09/2020 12:05 AM  
 Glucose Negative 11/09/2020 12:05 AM  
 Ketone Negative 11/09/2020 12:05 AM  
 Bilirubin Negative 11/09/2020 12:05 AM  
 Urobilinogen 0.1 11/09/2020 12:05 AM  
 Nitrites Negative 11/09/2020 12:05 AM  
 Leukocyte Esterase Trace (A) 11/09/2020 12:05 AM  
 Bacteria Negative 11/09/2020 12:05 AM  
 WBC 0-5 11/09/2020 12:05 AM  
 RBC 0-5 11/09/2020 12:05 AM  
 
 
 
Medications Reviewed:  
 
Current Facility-Administered Medications Medication Dose Route Frequency  warfarin (COUMADIN) tablet 2.5 mg  2.5 mg Oral ONCE  
 iron sucrose (VENOFER) 200 mg in 0.9% sodium chloride 100 mL IVPB  200 mg IntraVENous Q24H  
 bumetanide (BUMEX) tablet 1 mg  1 mg Oral DAILY  hydrALAZINE (APRESOLINE) 20 mg/mL injection 10 mg  10 mg IntraVENous Q6H PRN  
 hydrALAZINE (APRESOLINE) tablet 25 mg  25 mg Oral TID  Warfarin - Pharmacy Dosing   Other Rx Dosing/Monitoring  PARoxetine (PAXIL) tablet 20 mg  20 mg Oral DAILY  albuterol-ipratropium (DUO-NEB) 2.5 MG-0.5 MG/3 ML  3 mL Nebulization Q6H PRN  
 cephALEXin (KEFLEX) capsule 250 mg  250 mg Oral BID  ranolazine ER (RANEXA) tablet 1,000 mg  1,000 mg Oral BID  ferrous sulfate tablet 325 mg  325 mg Oral DAILY WITH BREAKFAST  docusate sodium (COLACE) capsule 100 mg  100 mg Oral BID  hydrOXYzine HCL (ATARAX) tablet 10 mg  10 mg Oral TID PRN  
 glucose chewable tablet 16 g  4 Tab Oral PRN  
 glucagon (GLUCAGEN) injection 1 mg  1 mg IntraMUSCular PRN  
 dextrose 10% infusion 0-250 mL  0-250 mL IntraVENous PRN  
 insulin lispro (HUMALOG) injection   SubCUTAneous AC&HS  sodium chloride (NS) flush 5-40 mL  5-40 mL IntraVENous Q8H  
 sodium chloride (NS) flush 5-40 mL  5-40 mL IntraVENous PRN  
 acetaminophen (TYLENOL) tablet 650 mg  650 mg Oral Q6H PRN Or  
 acetaminophen (TYLENOL) suppository 650 mg  650 mg Rectal Q6H PRN  polyethylene glycol (MIRALAX) packet 17 g  17 g Oral DAILY PRN  promethazine (PHENERGAN) tablet 12.5 mg  12.5 mg Oral Q6H PRN Or  
 ondansetron (ZOFRAN) injection 4 mg  4 mg IntraVENous Q6H PRN  
 
______________________________________________________________________ EXPECTED LENGTH OF STAY: 6d 12h ACTUAL LENGTH OF STAY:          10 
 
            
 Sergey Castro MD

## 2020-11-19 NOTE — PROGRESS NOTES
Problem: Self Care Deficits Care Plan (Adult) Goal: *Acute Goals and Plan of Care (Insert Text) Description: FUNCTIONAL STATUS PRIOR TO ADMISSION: Patient was modified independent using a rollator for functional mobility. Patient with recent hospital admission with discharge home however returned s/p GLF with left leg wound. HOME SUPPORT: The patient lived alone with daughter to provide assistance occasionally, however daughter is caring for her baby and is looking into hiring caregivers for patient per  note. Occupational Therapy Goals Initiated 11/13/2020 1. Patient will perform ADLs standing 5 mins without fatigue or LOB with contact guard assistance within 5 day(s). 2.  Patient will perform lower body ADLs with supervision/set-up within 5 day(s) using AE PRN. 3.  Patient will perform sponge bathing x supervision/set-up within 5 day(s). 4.  Patient will perform toilet transfers with contact guard assistance within 5 day(s). 5.  Patient will perform all aspects of toileting with contact guard assistance within 7 day(s). 6.  Patient will complete LVAD switchover from wall <> portable battery pack with independence within 7 day(s) Outcome: Progressing Towards Goal 
 OCCUPATIONAL THERAPY TREATMENT Patient: Ezequiel Amin (86 y.o. female) Date: 11/19/2020 Diagnosis: Anemia [D64.9] ROCIO (acute kidney injury) ([facility] [address] [N17.9] <principal problem not specified> Precautions: Fall Chart, occupational therapy assessment, plan of care, and goals were reviewed. ASSESSMENT Patient continues with skilled OT services and is progressing towards goals.   This session, patient demonstrated improved balance and improved cognition with sequencing of LVAD equipment during switchover, however patient continues to be limited by cardiopulmonary tolerance (HR 120s-130s with functional mobility short distances), decreased safety awareness, and difficulty problem-solving through complex tasks and emergency scenarios. Patient adamant about returning home this session, stating she will have the things she needs. If patient returns home, recommend a daily caregiver to assist with LVAD switchover, management of LVAD waistbag, and all other ADL and IADL tasks including changing out bedside commode pan, assist with bathing, dressing, laundry, cooking and cleaning. Patient will also need a rollator that has an intact braking system. If this cannot be provided, recommend inpatient rehab to maximize patient safety and endurance with AD Current Level of Function Impacting Discharge (ADLs): MIN A for seated dressing tasks; supervision for all basic ADLs; anticipate inability to complete any higher level IADL task due to poor activity tolerance and safety awareness Other factors to consider for discharge: patient has good support from daughter; patient needs assistance managing LVAD equipment PLAN : 
Patient continues to benefit from skilled intervention to address the above impairments. Continue treatment per established plan of care. to address goals. Recommend with staff: OOB x 3 to chair Recommend next OT session: continued progression with ADL tolerance Recommendation for discharge: (in order for the patient to meet his/her long term goals) Occupational therapy at least 2 days/week in the home AND ensure assist and/or supervision for safety with all ADL and IADL tasks; if daily supervision/physical assistance with ADL and IADL tasks cannot be provided by hired caregiver, patient will need IPR to maximize safety and independence with daily tasks This discharge recommendation: 
Has been made in collaboration with the attending provider and/or case management IF patient discharges home will need the following DME: walker: rollator SUBJECTIVE:  
Patient stated I need to eat my own food.  OBJECTIVE DATA SUMMARY:  
 Cognitive/Behavioral Status: 
Neurologic State: Alert Orientation Level: Oriented X4 Cognition: Appropriate for age attention/concentration Perception: Appears intact Perseveration: No perseveration noted Safety/Judgement: Decreased insight into deficits; Decreased awareness of need for safety Functional Mobility and Transfers for ADLs: 
Bed Mobility: 
 Not observed this session. Transfers: 
Sit to Stand: Stand-by assistance Functional Transfers Bathroom Mobility: Stand-by assistance(with rollator) Balance: 
Sitting: Intact; Without support Sitting - Static: Good (unsupported) Sitting - Dynamic: Good (unsupported) Standing: Impaired; With support Standing - Static: Good;Occasional 
Standing - Dynamic : Fair;Constant support ADL Intervention: 
  
 
Grooming Brushing Teeth: Stand-by assistance(HR elevated) Upper Body Dressing Assistance Dressing Assistance: Minimum assistance Patient continues to require verbal cues for management of LVAD waistbag. Patient continues to require verbal cues for complex problem-solving. Cognitive Retraining Safety/Judgement: Decreased insight into deficits; Decreased awareness of need for safety; Patient verbalized understanding of need for reaching back for seat prior to sitting however did not demonstrate understanding. Patient verbalizing that she plans to utilize bedside commodes at home and will have a better time managing LVAD equipment when she is not wearing a hospital gown. OT agrees that patient will be more acclimated in her home environment, however patient continues to present with safety concerns during session, specifically with complex problem-solving. However, balance and basic sequencing with LVAD equipment has significantly improved to date. Activity Tolerance:  
Poor, requires frequent rest breaks, observed SOB with activity and HR elevates to 120s with minimal activity After treatment patient left in no apparent distress:  
Sitting in chair and Call bell within reach COMMUNICATION/COLLABORATION:  
The patients plan of care was discussed with: Physical therapist and Registered nurse. Shaneka Odom Time Calculation: 28 mins

## 2020-11-19 NOTE — PROGRESS NOTES
Transitions of Care: 33% risk of re-admission  
 
 -TBD, [facility] cannot accept due to trilogy needs- [facility] is on quarantine, [facility] endorses they accept LVADs and Trilogy The CM discussed with [facility] team- will pursue SNF options, will also need to continue to discuss LTC plan with daughter Lisa Munguia further (CM provided resources to family yesterday), will need to further discuss plan. CM called [facility]- they endorse they are able to accept LVADs as well as Trilogy, will discuss with daughter to confirm patient/daughter agreeable for [facility] referral.  
 
10:16 a.m.- CM attempted to call daughter Lisa Munguia and provide update that [facility] cannot accept patient, and discuss alternative options and again discuss LTC plan for the patient. Voicemail box is full- unable to leave voicemail, will make additional attempts 11:31 a.m.- CM made additional attempt to call patient's daughter Lisa Munguia- unable to reach, voicemail box full. [facility] plans to establish family meeting with patient and daughter to discuss needs, etc. Awaiting further PT/OT evaluations to assist in determining specific needs. CM has made 2x attempts to reach daughter, unable to reach at this time and voicemail box full. CM will send referral to [facility] to review as an option for SNF placement- to review clinically, will need to discuss further with daughter. The patient endorses that she wants to go home. 12:51 p.m.- The CM called Irlanda Sullivan with [facility]- they have accepted LVADs in the past from [facility], however, she does not know if they would be willing to consider from other hospitals outside of [facility] system- CM inquired about Trilogy, [facility] is unable to accept any patient that requires Trilogy- not an option.

## 2020-11-19 NOTE — PROGRESS NOTES
Problem: Mobility Impaired (Adult and Pediatric) Goal: *Acute Goals and Plan of Care (Insert Text) Description: FUNCTIONAL STATUS PRIOR TO ADMISSION: Patient was modified independent using a rollator for functional mobility. HOME SUPPORT PRIOR TO ADMISSION: The patient lived alone with daughter available to provide assistance. Physical Therapy Goals Initiated 11/13/2020 1. Patient will move from supine to sit and sit to supine , scoot up and down, and roll side to side in bed with modified independence within 7 day(s). 2.  Patient will transfer from bed to chair and chair to bed with modified independence using the least restrictive device within 7 day(s). 3.  Patient will perform sit to stand with modified independence within 7 day(s). 4.  Patient will ambulate with modified independence for 150 feet with the least restrictive device within 7 day(s). Outcome: Progressing Towards Goal 
 
PHYSICAL THERAPY TREATMENT Patient: Cha Jones (56 y.o. female) Date: 11/19/2020 Diagnosis: Anemia [D64.9] ROCIO (acute kidney injury) ([facility] [address] [N17.9] <principal problem not specified> Precautions: Fall Chart, physical therapy assessment, plan of care and goals were reviewed. ASSESSMENT Patient continues with skilled PT services and is progressing towards goals. Pt was able to progress to supervision level with. Pt does have decrease gait tolerance requiring standing rest breaks. Attempted to able to reviewed low battery scenario and power disconnect. Pt had inability to recall the correct action. Pt stating that she would change to backup . Pt describing how she would do it by unscrewing a screw to change over to new controller. Pt unable to Identity power leads. Pt did report she would look in her LVAD handbook and contact the hospital. Reviewed system control failure.   Pt uses power module at a very limited basis due to concerns of having to leave the house in emergency. She verbalized how to check batteries and that she change them [id number]. At this point pt could benefit from supervision with HHPT. Concerns with decrease activity tolerance, recent fall, and pt ability to manage LVAD in emergency situations. Current Level of Function Impacting Discharge (mobility/balance): supervision Other factors to consider for discharge: Safety, recent fall PLAN : 
Patient continues to benefit from skilled intervention to address the above impairments. Continue treatment per established plan of care. to address goals. Recommendation for discharge: (in order for the patient to meet his/her long term goals) HHPT with supervision if unable then SNF Pt is too high level for inpt rehab This discharge recommendation: 
Has not yet been discussed the attending provider and/or case management IF patient discharges home will need the following DME: pt is in the need for a new rollator. Unable to go through insurance. Pt daughter is aware and is preparing to order through [employer] SUBJECTIVE:  
Patient stated I am going home.  OBJECTIVE DATA SUMMARY:  
Critical Behavior: 
Neurologic State: Alert Orientation Level: Oriented X4 Cognition: Appropriate for age attention/concentration Safety/Judgement: Decreased insight into deficits, Decreased awareness of need for safety Functional Mobility Training: 
Bed Mobility: 
  
  
  
  
  
  
Transfers: 
Sit to Stand: Stand-by assistance Stand to Sit: Stand-by assistance Balance: 
Sitting: Intact; Without support Sitting - Static: Good (unsupported) Sitting - Dynamic: Good (unsupported) Standing: Impaired; With support Standing - Static: Good;Occasional 
Standing - Dynamic : Fair;Constant support Ambulation/Gait Training: 
Distance (ft): 90 Feet (ft)(3 standing rest breaks) Assistive Device: Gait belt;Walker, rollator Ambulation - Level of Assistance: Stand-by assistance Gait Abnormalities: Decreased step clearance Base of Support: Widened Step Length: Left shortened;Right shortened Stairs: Therapeutic Exercises:  
 
Pain Rating: No complaints Activity Tolerance:  
requires rest breaks and elevated 's with gait After treatment patient left in no apparent distress:  
Sitting in chair and Call bell within reach COMMUNICATION/COLLABORATION:  
The patients plan of care was discussed with: Registered nurse. Wily Soliman PTA Time Calculation: 24 mins

## 2020-11-19 NOTE — PROGRESS NOTES
6954 Bedside and Verbal shift change report given to Steven Baugh (oncoming nurse) by Ayde Fish RN (offgoing nurse). Report included the following information SBAR, Kardex, Intake/Output, MAR, Recent Results and Med Rec Status. [facility] [address] [facility] for IV placement assistance. 1145 22g IV obtained. 1525 MAP: 100. Scheduled Hydralazine administered. 1935 Bedside and Verbal shift change report given to Ayde Fish RN (oncoming nurse) by Steven Baugh (offgoing nurse). Report included the following information SBAR, Kardex, Intake/Output, MAR, Recent Results and Med Rec Status. Problem: Diabetes Self-Management Goal: *Monitoring blood glucose, interpreting and using results Description: Identify recommended blood glucose targets  and personal targets. Outcome: Progressing Towards Goal 
Goal: *Prevention, detection, treatment of acute complications Description: List symptoms of hyper- and hypoglycemia; describe how to treat low blood sugar and actions for lowering  high blood glucose level. Outcome: Progressing Towards Goal 
Goal: *Prevention, detection and treatment of chronic complications Description: Define the natural course of diabetes and describe the relationship of blood glucose levels to long term complications of diabetes. Outcome: Progressing Towards Goal 
Goal: *Developing strategies to address psychosocial issues Description: Describe feelings about living with diabetes; identify support needed and support network Outcome: Progressing Towards Goal 
  
Problem: Falls - Risk of 
Goal: *Absence of Falls Description: Document Jovanni Apo Fall Risk and appropriate interventions in the flowsheet.  
Outcome: Progressing Towards Goal 
Note: Fall Risk Interventions: 
Mobility Interventions: Communicate number of staff needed for ambulation/transfer, OT consult for ADLs, Patient to call before getting OOB, PT Consult for mobility concerns, PT Consult for assist device competence, Strengthening exercises (ROM-active/passive), Utilize walker, cane, or other assistive device, Utilize gait belt for transfers/ambulation Medication Interventions: Patient to call before getting OOB, Evaluate medications/consider consulting pharmacy, Teach patient to arise slowly, Utilize gait belt for transfers/ambulation Elimination Interventions: Call light in reach, Patient to call for help with toileting needs, Stay With Me (per policy), Toilet paper/wipes in reach, Toileting schedule/hourly rounds History of Falls Interventions: Consult care management for discharge planning, Evaluate medications/consider consulting pharmacy, Door open when patient unattended, Investigate reason for fall, Utilize gait belt for transfer/ambulation Problem: Patient Education: Go to Patient Education Activity Goal: Patient/Family Education Outcome: Progressing Towards Goal 
  
Problem: Pressure Injury - Risk of 
Goal: *Prevention of pressure injury Description: Document Jaime Scale and appropriate interventions in the flowsheet. Outcome: Progressing Towards Goal 
Note: Pressure Injury Interventions: 
Sensory Interventions: Assess changes in LOC, Assess need for specialty bed, Check visual cues for pain, Discuss PT/OT consult with provider, Keep linens dry and wrinkle-free, Maintain/enhance activity level, Pressure redistribution bed/mattress (bed type) Moisture Interventions: Absorbent underpads, Apply protective barrier, creams and emollients, Assess need for specialty bed, Check for incontinence Q2 hours and as needed, Internal/External urinary devices, Maintain skin hydration (lotion/cream) Activity Interventions: Assess need for specialty bed, Increase time out of bed, Pressure redistribution bed/mattress(bed type), PT/OT evaluation Mobility Interventions: Assess need for specialty bed, Pressure redistribution bed/mattress (bed type), PT/OT evaluation Nutrition Interventions: Document food/fluid/supplement intake, Discuss nutritional consult with provider, Offer support with meals,snacks and hydration Friction and Shear Interventions: Lift sheet Problem: Patient Education: Go to Patient Education Activity Goal: Patient/Family Education Outcome: Progressing Towards Goal

## 2020-11-19 NOTE — PROGRESS NOTES
Pharmacist Note  Warfarin Dosing Consult provided for this 78 y. o.female to manage warfarin for Atrial Fibrillation; LVAD INR Goal: 1.8-2.5 Home regimen/ tablet size: 4 mg daily Drugs that may increase INR: None Drugs that may decrease INR: None Other current anticoagulants/ drugs that may increase bleeding risk: None Risk factors: Age > 72 Daily INR ordered: YES Recent Labs 11/19/20 
6247 11/18/20 
3114 11/18/20 
0515 11/17/20 
0510 HGB 8.4* 8.3*  --  8.1* INR 2.7*  --  2.6* 2.4* Date               INR                  Dose 11/14       2   4 mg  
11/15              2.1                   4 mg       
11/16              2.2                   4 mg      
11/17              2.4                   4 mg  
11/18              2.6                   2.5 mg    
 11/19             2.7                   2.5 mg                                                                          
Assessment/ Plan: Will order warfarin 2.5 mg PO x 1 dose. Pharmacy will continue to monitor daily and adjust therapy as indicated. Please contact the pharmacist at x [phone] or  for outpatient recommendations if needed.

## 2020-11-19 NOTE — PROGRESS NOTES
[address] [facility] [place] [address] in  [place], [place] Inpatient Progress Note Patient name: Nataly Murphy Patient : 1950 Patient MRN: [id number] Attending MD: Gladys Da Silva I* Date of service: 20 CHIEF COMPLAINT: 
Cellulitis 
  
24 hr events MAPs 88-100s mmHg Tremors continue to improve No ectopy on ranolazine Wants to go home PLAN: 
Continue current device speed 9400rpm; intolerant to higher speed due to VT Expect increasing intolerance to BB/ACEi/ARB/ARNI due to aTTR; on hold for now Application for patient assistance for tafamidis as OP Hydralazine 25mg po TID Hydralazine 10mg prn IV Q6hr, maintain MAP 70-90mmHg Intolerant of spironolactone due to hyperkalemia  
Continue Bumex 1mg daily;  Diuretic management per nephrology Coumadin per pharmacy; goal INR lowered to 1.8-2.5 EP cardiology consult appreciate; no ectopy on ranolazine Transfuse to keep HGB>7 Antibiotics per ID- Keflex po, monitor CBC Use home Trilogy when napping and QHS 
DTC consult appreciated PT/OT- specific recommendations for discharge Check uric acid 8.2 Venofer 200mg IV x 2 doses - iron sat 20%, ferritin 411 [employer] pending Nutrition consult appreciated Neurology consult appreciated; held gabapentin with ROCIO 
GI, urogynecology, and pulmonary consults as OP Palliative care consult appreciated; patient remains full code, AMD given to patient/daughter to complete Hematology consult appreciate input; elevated free light chains Pulmonary consult appreciated; assistance with discharge planning- patient uses Trilogy for COPD, no IPR in [facility] area will accept Trilogy DEBORAH, accepts V60 BiPAP Case management to assist with discharge planning- No IPR will accept Trilogy machines and LVAD Discharge TBD- will plan for family meeting with daughter in next few days to discuss goals of care and discharge planning.   
 
  
IMPRESSION: 
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 
C/b cardiogenic shock s/p Impella bridge to LVAD S/p HeartMate 2 LVAD implantation (4/5/17 by Dr. Charlotte Castillo) · C/b delayed extubation due to severe COPD 
· C/b critical illness polyneuropathy · C/b prolonged hospitalization post-LVAD, 55 days · C/b sternal wound infection s/p debridement (by Dr. Ed Vaughan) s/p wound vac · C/b sacral ulcer · Would culture positive for Staph aureus, not MRSA · C/b LVAD site drainage, improved S/p CRT-ICD · ICD fired due to electrolyte imbalance (2011) H/o breast cancer (1992) · s/p bilateral mastectomy/chemo and endometrial cancer s/p hysterectomy · Lymphedema of LLE due to cancer treatment Severe COPD with FEV1 50% Depression Atrial fibrillation H/o \"two mini strokes\" S/p fall with hip facture · Right hip hemmiarthroplasty (5/23/18) by Dr. Mcgovern December) · S/p removed hip hardwarare due to pain (4/15/19) COPD severe CKD, stage 3 Hyperkalemia Pulmonary hypertension Cardiac risk factors: · Morbid obesity, Body mass index is 53.63 kg/m². · DM2 insulin dependent · JED on CPAP 
· HL Urinary incontinence, severe · no procedures due to anticoagulation · conservative management with Detrol 4 pills bid Endometrial cancer (2002) HTN 
HL 
aTTR amyloidosis  
Left leg wound following fall Encephalopathy Renal failure  
  
CARDIAC IMAGING: 
Echo (9/24/19) LVEF 20-25%, AV opens 1:1, no AR Echo (5/29/18) LVEF 10-%, ramps study done, report in [facility] Echo (1/9/18) ramp study done, LVEDD 7.1cm C (18) 2 vessel disease with 90% OM, 80% PDA, DSA to PDA branch 
TTE (10/27/20) LVEF 15-20% LVIDd 7.26cm RVIDd 2.82cm Tapse 1.14cm 
  
HEMODYNAMICS: 
[facility] 11/3 shows CVP 14 mmHg, PA 36/18 mmHg, PCWP 15 mmHg, Sal CI 2.4 
CPEST not done 6MW not done 
  
OTHER IMAGING: 
EGD/C-scope (19) no active bleeding and polyp removed. 
  
INTERVAL HISTORY: 
Doing well Feels great Labs improving 
  
FAMILY HISTORY: 
Mother  76 years, diagnosed with DM, HTN, CAD Father  [de-identified]years old, HTN, CAD, MI 
Positive for DM and heart disease in the family, brother with valve replacement 
  
SOCIAL HISTORY: 
Never alcohol, never smoked, , lives alone, no illicit drug use, lives in house, ambulatory status indpendedn, children: daughter, occupation retired Lives alone. LVAD INTERROGATION: 
Device interrogated in person Device function normal, normal flow, no events LVAD Pump Speed (RPM): 9400 Pump Flow (LPM): 5.3 MAP: 110 PI (Pulsitility Index): 5.9 Power: 5.8  Test: No 
Back Up  at Bedside & Labeled: Yes Power Module Test: No 
Driveline Site Care: No 
Driveline Dressing: Clean, Dry, and Intact Outpatient: No 
MAP in Therapeutic Range (Outpatient): Yes Testing Alarms Reviewed: Yes 
Back up SC speed: 9400 Back up Low Speed Limit: 9200 Emergency Equipment with Patient?: Yes Emergency procedures reviewed?: Yes Drive line site inspected?: No 
Drive line intergrity inspected?: Yes Drive line dressing changed?: No 
 
PHYSICAL EXAM: 
Visit Vitals Pulse 90 Temp 98.3 °F (36.8 °C) Resp 18 Ht 5' 2\" (1.575 m) Wt 283 lb 15.2 oz (128.8 kg) SpO2 93% BMI 51.94 kg/m² General: Patient is well developed, well-nourished in no acute distress HEENT: Normocephalic and atraumatic. No scleral icterus. Pupils are equal, round and reactive to light and accomodation. No conjunctival injection. Oropharynx is clear. Neck: Supple. No evidence of thyroid enlargements or lymphadenopathy. JVD: Cannot be appreciated Lungs: Breath sounds are equal and clear bilaterally. No wheezes, rhonchi, or rales. Heart: Regular rate and rhythm with normal S1 and S2. No murmurs, gallops or rubs. +ICD DARRELL chest  
Abdomen: Soft, obese, no mass or tenderness. No organomegaly or hernia. Bowel sounds present. Genitourinary and rectal: deferred Extremities: No cyanosis, clubbing, + edema. Neurologic:  +hand tremors Psychiatric: Awake, alert and oriented x 3. Appropriate mood and affect. Skin: Redness, ruddiness and trace edema, LLE wound s/p fall, skin breakdown RLE. BLE covered with ace wraps 
  
 
REVIEW OF SYSTEMS: 
General: Denies fever, night sweats. Ready to go home Ear, nose and throat: Denies difficulty hearing, sinus problems, runny nose, post-nasal drip, ringing in ears, mouth sores, loose teeth, ear pain, nosebleeds, sore throate, facial pain or numbess Cardiovascular: see above in the interval history Respiratory: Denies cough, wheezing, sputum production, hemoptysis. Gastrointestinal: Denies heartburn, constipation, intolerance to certain foods, diarrhea, abdominal pain, nausea, vomiting, difficulty swallowing, blood in stool Kidney and bladder: Denies painful urination, frequent urination Musculoskeletal: Denies joint pain, +muscle weakness Skin and hair: Denies change in existing skin lesions, hair loss or increase, breast changes PAST MEDICAL HISTORY: 
Past Medical History:  
Diagnosis Date  Asthma  Cancer ([facility] [address] breast  
 Cancer ([facility] [address]   
 endometrial  
 Congestive heart failure, unspecified  CRI (chronic renal insufficiency)  Depression  Diabetes ([facility] [address]  Hypertension PAST SURGICAL HISTORY: 
Past Surgical History:  
Procedure Laterality Date  HX HERNIA REPAIR    
 HX HYSTERECTOMY  HX MASTECTOMY FAMILY HISTORY: 
History reviewed. No pertinent family history. SOCIAL HISTORY: 
Social History Socioeconomic History  Marital status:  Spouse name: Not on file  Number of children: Not on file  Years of education: Not on file  Highest education level: Not on file Tobacco Use  Smoking status: Never Smoker  Smokeless tobacco: Never Used Substance and Sexual Activity  Alcohol use: Not Currently LABORATORY RESULTS: 
  
Labs Latest Ref Rng & Units 11/19/2020 11/18/2020 11/17/2020 11/16/2020 11/15/2020 11/14/2020 11/13/2020 WBC 3.6 - 11.0 K/uL 6.2 6.3 6.9 7.7 5.4 5.5 6.7  
RBC 3.80 - 5.20 M/uL 3.06(L) 2.96(L) 2.90(L) 3.09(L) 3.01(L) 2.87(L) 2.80(L) Hemoglobin 11.5 - 16.0 g/dL 8.4(L) 8. 3(L) 8. 1(L) 8.6(L) 8. 3(L) 8.2(L) 7. 8(L) Hematocrit 35.0 - 47.0 % 28. 0(L) 27. 7(L) 26. 4(L) 28. 4(L) 27. 4(L) 26. 4(L) 25. 1(L) MCV 80.0 - 99.0 FL 91.5 93.6 91.0 91.9 91.0 92.0 89.6 Platelets 147 - 545 K/uL 178 167 162 174 172 164 136(L) Lymphocytes 12 - 49 % - - - - - - - Monocytes 5 - 13 % - - - - - - - Eosinophils 0 - 7 % - - - - - - - Basophils 0 - 1 % - - - - - - - Albumin 3.5 - 5.0 g/dL 3. 1(L) 3.0(L) 2. 9(L) 3. 1(L) 2. 9(L) 2. 9(L) 2. 8(L) Calcium 8.5 - 10.1 MG/DL 8.9 8.6 8.8 8.9 9.1 8.6 8.4(L) Glucose 65 - 100 mg/dL 118(H) 121(H) 131(H) 128(H) 127(H) 130(H) 213(H) BUN 6 - 20 MG/DL 52(H) 51(H) 50(H) 55(H) 59(H) 69(H) 75(H) Creatinine 0.55 - 1.02 MG/DL 2.57(H) 2.58(H) 2.51(H) 2.67(H) 2.50(H) 2.69(H) 3.08(H) Sodium 136 - 145 mmol/L 141 140 140 140 141 142 139 Potassium 3.5 - 5.1 mmol/L 4.7 4.8 4.6 5.2(H) 4.9 5.1 4.6 TSH 0.450 - 4.500 uIU/mL - - - - - - -  
LDH 81 - 246 U/L 201 246 205 226 217 240 219 Some recent data might be hidden Lab Results Component Value Date/Time TSH 2.980 10/01/2020 12:00 AM  
 
 
ALLERGY: 
No Known Allergies CURRENT MEDICATIONS: 
 
Current Facility-Administered Medications:  
  warfarin (COUMADIN) tablet 2.5 mg, 2.5 mg, Oral, ONCE, Concetta Alex MD 
   iron sucrose (VENOFER) 200 mg in 0.9% sodium chloride 100 mL IVPB, 200 mg, IntraVENous, Q24H, Teena Franco NP 
  bumetanide (BUMEX) tablet 1 mg, 1 mg, Oral, DAILY, Jaspreet Mendoza MD, 1 mg at 11/19/20 0945   hydrALAZINE (APRESOLINE) 20 mg/mL injection 10 mg, 10 mg, IntraVENous, Q6H PRN, Triny Limon NP, 10 mg at 11/18/20 1753   hydrALAZINE (APRESOLINE) tablet 25 mg, 25 mg, Oral, TID, Chantelle Solano NP, 25 mg at 11/19/20 0945   Warfarin - Pharmacy Dosing, , Other, Rx Dosing/Monitoring, Yissel Rivera MD 
  PARoxetine (PAXIL) tablet 20 mg, 20 mg, Oral, DAILY, Albert Akhtar DO, 20 mg at 11/19/20 4403   albuterol-ipratropium (DUO-NEB) 2.5 MG-0.5 MG/3 ML, 3 mL, Nebulization, Q6H PRN, Andi Oropeza MD, 3 mL at 11/14/20 1549   cephALEXin (KEFLEX) capsule 250 mg, 250 mg, Oral, BID, Lanette Wade MD, 250 mg at 11/19/20 0945   ranolazine ER (RANEXA) tablet 1,000 mg, 1,000 mg, Oral, BID, Amy Marion MD, 1,000 mg at 11/19/20 1079   ferrous sulfate tablet 325 mg, 325 mg, Oral, DAILY WITH BREAKFAST, Eugene KATE DO, 325 mg at 11/19/20 0945   docusate sodium (COLACE) capsule 100 mg, 100 mg, Oral, BID, Eugene KATE DO, 100 mg at 11/19/20 0945   hydrOXYzine HCL (ATARAX) tablet 10 mg, 10 mg, Oral, TID PRN, CHARLENE [employer] M, DO 
  glucose chewable tablet 16 g, 4 Tab, Oral, PRN, CHARLENE Sainz M, DO 
  glucagon (GLUCAGEN) injection 1 mg, 1 mg, IntraMUSCular, PRN, Eugene KATE DO 
  dextrose 10% infusion 0-250 mL, 0-250 mL, IntraVENous, PRN, CHARLENE Sainz MDO 
  insulin lispro (HUMALOG) injection, , SubCUTAneous, AC&HS, Eugene KATE DO, Stopped at 11/18/20 2200 
  sodium chloride (NS) flush 5-40 mL, 5-40 mL, IntraVENous, Q8H, Scott Bedolla MD, 10 mL at 11/19/20 0510 
  sodium chloride (NS) flush 5-40 mL, 5-40 mL, IntraVENous, PRN, Scott Bedolla MD 
  acetaminophen (TYLENOL) tablet 650 mg, 650 mg, Oral, Q6H PRN, 650 mg at 11/18/20 2309 **OR** acetaminophen (TYLENOL) suppository 650 mg, 650 mg, Rectal, Q6H PRN, Elizabeth Iverson MD 
  polyethylene glycol (MIRALAX) packet 17 g, 17 g, Oral, DAILY PRN, Elizabeth Iverson MD 
  promethazine (PHENERGAN) tablet 12.5 mg, 12.5 mg, Oral, Q6H PRN **OR** ondansetron (ZOFRAN) injection 4 mg, 4 mg, IntraVENous, Q6H PRN, Elizabeth Iverson MD 
 
PATIENT CARE TEAM: 
Patient Care Team: 
Joann Birch NP as PCP - General (Nurse Practitioner) Thank you for allowing me to participate in this patient's care. ETA [place] [address] [place] [address], [address] Phone: [phone] Fax: [phone] [facility] ATTENDING ADDENDUM Patient was seen and examined in person. Data and notes were reviewed. I have discussed and agree with the plan as noted in the NP note above without further additions. Yuliana Hernández MD PhD 
Beatriz Villasenor [address] [place] [address]

## 2020-11-19 NOTE — PROGRESS NOTES
[facility] VAD/Heart Failure Progress Note Admit Date: 2020 POD:  * No surgery found * Procedure:      
 
 Subjective:  
Some issues with HTN; Abx's Objective:  
Vitals: 
Pulse 96, temperature 98.1 °F (36.7 °C), resp. rate 18, height 5' 2\" (1.575 m), weight 283 lb 15.2 oz (128.8 kg), SpO2 94 %. Temp (24hrs), Av °F (36.7 °C), Min:97.6 °F (36.4 °C), Max:98.3 °F (36.8 °C) Hemodynamics: 
 CO:   
 CI:   
 CVP:   
 SVR:   
 PAP Systolic:   
 PAP Diastolic:   
 PVR:   
 MK86:   
 SCV02:   
 
VAD Interrogation: LVAD (Heartmate) Pump Speed (RPM): 9400 Pump Flow (LPM): 5.1 PI (Pulsitility Index): 5.1 Power: 5.7 MAP: 88  Test: No 
Back Up  at Bedside & Labeled: Yes Power Module Test: No 
Driveline Site Care: No 
Driveline Dressing: Clean, Dry, and Intact EKG/Rhythm:   
 
Extubation Date / Time:  
 
CT Output:  
 
Ventilator: 
  
 
Oxygen Therapy: 
Oxygen Therapy O2 Sat (%): 94 % (20 1236) Pulse via Oximetry: 81 beats per minute (20 1550) O2 Device: Room air (20 0831) O2 Flow Rate (L/min): 2 l/min (20 0848) CXR: 
 
Admission Weight: Last Weight Weight: 297 lb 8 oz (134.9 kg) Weight: 283 lb 15.2 oz (128.8 kg) Intake / Output / Drain: 
Current Shift: No intake/output data recorded. Last 24 hrs.:  
 
Intake/Output Summary (Last 24 hours) at 2020 1404 Last data filed at 2020 2102 Gross per 24 hour Intake 462 ml Output 600 ml Net -138 ml No results for input(s): CPK, CKMB, TROIQ in the last 72 hours. Recent Labs 20 
1092 20 
3416 20 
0515 20 
0515 20 
0510   --  140 140  --   
K 4.7  --  4.8 4.6  --   
CO2 27  --  27 29  --   
BUN 52*  --  51* 50*  --   
CREA 2.57*  --  2.58* 2.51*  --   
*  --  121* 131*  --   
PHOS  --   --   --  2.5*  --   
MG 1.9 2.0  --  2.0  --   
WBC 6.2 6.3  --   --  6.9 HGB 8.4* 8.3*  --   --  8.1*  
 HCT 28.0* 27.7*  --   --  26.4*  
 167  --   --  162 Recent Labs 11/19/20 
3517 11/18/20 
0515 11/17/20 
0510 INR 2.7* 2.6* 2.4* PTP 27.4* 25.8* 24.3* No lab exists for component: PBNP Current Facility-Administered Medications:  
  warfarin (COUMADIN) tablet 2.5 mg, 2.5 mg, Oral, ONCE, Cara Rubio MD 
  iron sucrose (VENOFER) 200 mg in 0.9% sodium chloride 100 mL IVPB, 200 mg, IntraVENous, Q24H, Roxy Lacey NP, Last Rate: 440 mL/hr at 11/19/20 1242, 200 mg at 11/19/20 1242   bumetanide (BUMEX) tablet 1 mg, 1 mg, Oral, DAILY, Jaspreet Mendoza MD, 1 mg at 11/19/20 0945   hydrALAZINE (APRESOLINE) 20 mg/mL injection 10 mg, 10 mg, IntraVENous, Q6H PRN, Triny Limon NP, 10 mg at 11/18/20 1753   hydrALAZINE (APRESOLINE) tablet 25 mg, 25 mg, Oral, TID, Roxy Lacey NP, 25 mg at 11/19/20 0945   Warfarin - Pharmacy Dosing, , Other, Rx Dosing/Monitoring, Cara Rubio MD 
  PARoxetine (PAXIL) tablet 20 mg, 20 mg, Oral, DAILY, Albert Akhtar DO, 20 mg at 11/19/20 0893   albuterol-ipratropium (DUO-NEB) 2.5 MG-0.5 MG/3 ML, 3 mL, Nebulization, Q6H PRN, Daniel Galeano MD, 3 mL at 11/14/20 1549   cephALEXin (KEFLEX) capsule 250 mg, 250 mg, Oral, BID, Yazmin Wolf MD, 250 mg at 11/19/20 0945   ranolazine ER (RANEXA) tablet 1,000 mg, 1,000 mg, Oral, BID, Jerry Mosher MD, 1,000 mg at 11/19/20 9271   ferrous sulfate tablet 325 mg, 325 mg, Oral, DAILY WITH BREAKFAST, Stephanetine Cheslea M, DO, 325 mg at 11/19/20 0945   docusate sodium (COLACE) capsule 100 mg, 100 mg, Oral, BID, Marcotistine Righter M, DO, 100 mg at 11/19/20 0945   hydrOXYzine HCL (ATARAX) tablet 10 mg, 10 mg, Oral, TID PRN, Meche Jean M, DO 
  glucose chewable tablet 16 g, 4 Tab, Oral, PRN, Ya Tran M, DO 
  glucagon (GLUCAGEN) injection 1 mg, 1 mg, IntraMUSCular, PRN, Shadi Jean, DO 
   dextrose 10% infusion 0-250 mL, 0-250 mL, IntraVENous, PRN, Alpa KATE DO 
  insulin lispro (HUMALOG) injection, , SubCUTAneous, AC&HS, Caitlin Gallagher DO, 2 Units at 11/19/20 1232   sodium chloride (NS) flush 5-40 mL, 5-40 mL, IntraVENous, Q8H, Shanae Lay MD, 10 mL at 11/19/20 0510 
  sodium chloride (NS) flush 5-40 mL, 5-40 mL, IntraVENous, PRN, Shanae Lay MD 
  acetaminophen (TYLENOL) tablet 650 mg, 650 mg, Oral, Q6H PRN, 650 mg at 11/18/20 2309 **OR** acetaminophen (TYLENOL) suppository 650 mg, 650 mg, Rectal, Q6H PRN, Shanae Lay MD 
  polyethylene glycol (MIRALAX) packet 17 g, 17 g, Oral, DAILY PRN, Shanae Lay MD 
  promethazine (PHENERGAN) tablet 12.5 mg, 12.5 mg, Oral, Q6H PRN **OR** ondansetron (ZOFRAN) injection 4 mg, 4 mg, IntraVENous, Q6H PRN, Shanae Lay MD 
 
A/P 
  
S/P LVAD - good flows Need for anticoagulation - coumadin Sternal wound infecion - abx's Bite - abx's  
  
Risk of morbidity and mortality - high Medical decision making - high complexity Signed By: Ave Harmon MD

## 2020-11-19 NOTE — WOUND CARE
WOCN Note: Follow-up visit for leg wounds.  
  
Chart shows: 
Admitted for leg avulsion post fall with some superficial debridement done by Dr. Jesus Frank History of LVAD, DM  
  
Assessment:  
Appropriately conversational; tolerates wound care well. Moved from bedside commode to chair with legs elevated Bed: total care with foam mattress, alarm pad, & EHOB/waffle cushion 
  
1. POA, left lower anterior leg = 7.5 x 6 x 0.5 cm  90% moist red/burgundy of traumatized tissue with a few areas of granulation buds noted 10% scattered yellow. Still evolving from primary trauma. No active bleeding. Immediate periwound skin with devitalized tissue declaring itself - able to gently removed some of devitalized tissue with saline and gauze. Anticipate the wound to get bigger with loss of dead tissue. Irregular/tattered margins.  Rest of periwound with no redness or calor, no edema. Cleaned with saline, applied collagen with silver, covered with Mepitel One and dry bandage. ACE wrap from toes to knee. 
  
2. POA, right lower anterior leg from previous edema per daughter's report = 0.6 x 0.3 x 0.1 cm  No exudate and base is moist red. Periwound with no redness.  Margins are flat and wound appears ready to resurface with some light pink epithelialization at margin. Foam dressing applied. Bilateral buttocks and labia are unchanged from previous assessment. Foam dressings in use to buttocks for protection. 
  
Recommendations:   
LLL: clean with saline, apply Collagen dressing (left in room), secure collagen with Mepitel One and then cover with dry gauze. Change every 3 days. RLL: clean with saline, apply petrolatum and foam dressing.  Change every 3 days. Buttocks: protect with foam dressing and change as needed with soiling Turn/reposition approximately every 2 hours and offload heels with pillows at all times in bed. EHOB cushion placed over bed alarm pad. 
  
Transition of Care: Plan to follow as needed while admitted to hospital. Discussed need to be followed at [facility] with patient and . [address] [facility] [address] is likely the closest @ [phone] & she can make appointment herself. HEMA LevyN, RN, [employer] Certified Wound, Ostomy, Continence Nurse 
office [phone] 
pager [phone] or call  to page

## 2020-11-19 NOTE — PROGRESS NOTES
[facility] 
 [address] [facility], [facility] [address] [facility] Phone: [phone]  
  
Nephrology Progress Note 
Kesha Root     1950     [id number] Date of Admission : 11/9/2020 11/19/20 CC: Follow up for ARF Assessment and Plan ROCIO on CKD 
- resolving ATN  
- continue Bumex 1 mg daily on d/c - I will see her in f/u in 1 week as telehealth visit. She needs to continue w/ weekly labs through home health - we can transition her to our CKD clinic in [address] [place] [address] area once she is deemed stable   
  
CKD stage III. - baseline around 2 
-Etiology of CKD is likely diabetes hypertension and cardiomyopathy. However she does have a positive PYP testing which raises the possibility of amyloidosis. 
  
Chronic systolic CHF, stage C NYHA class IV 
-Combined ischemic and nonischemic cardiomyopathy 
-S/p HM 2 LVAD implant 4/15/2017 by Dr. Sukumar Olmos at [facility] 
- hx of recurrent VT : off mexiletine - Recent acute on chronic RV failure. - Recent sternal wound infection with staph aureus and Morganella.  
  
Left Calf laceration Blood loss anemia - per primary team 
 
Chronic anemia : +ve PYP testing. Consider Bone marrow Bx Severe COPD. Pulmonary hypertension. Chronic A. fib. History of endometrial Ca ATTR amyloidosis Interval History: 
Seen and examined. No new sx Resolved involuntary movements Good UOP Labs pending Review of Systems: A comprehensive review of systems was negative except for that written in the HPI. Current Medications:  
Current Facility-Administered Medications Medication Dose Route Frequency  bumetanide (BUMEX) tablet 1 mg  1 mg Oral DAILY  hydrALAZINE (APRESOLINE) 20 mg/mL injection 10 mg  10 mg IntraVENous Q6H PRN  
 hydrALAZINE (APRESOLINE) tablet 25 mg  25 mg Oral TID  Warfarin - Pharmacy Dosing   Other Rx Dosing/Monitoring  PARoxetine (PAXIL) tablet 20 mg  20 mg Oral DAILY  albuterol-ipratropium (DUO-NEB) 2.5 MG-0.5 MG/3 ML  3 mL Nebulization Q6H PRN  
 cephALEXin (KEFLEX) capsule 250 mg  250 mg Oral BID  ranolazine ER (RANEXA) tablet 1,000 mg  1,000 mg Oral BID  ferrous sulfate tablet 325 mg  325 mg Oral DAILY WITH BREAKFAST  docusate sodium (COLACE) capsule 100 mg  100 mg Oral BID  hydrOXYzine HCL (ATARAX) tablet 10 mg  10 mg Oral TID PRN  
 glucose chewable tablet 16 g  4 Tab Oral PRN  
 glucagon (GLUCAGEN) injection 1 mg  1 mg IntraMUSCular PRN  
 dextrose 10% infusion 0-250 mL  0-250 mL IntraVENous PRN  
 insulin lispro (HUMALOG) injection   SubCUTAneous AC&HS  sodium chloride (NS) flush 5-40 mL  5-40 mL IntraVENous Q8H  
 sodium chloride (NS) flush 5-40 mL  5-40 mL IntraVENous PRN  
 acetaminophen (TYLENOL) tablet 650 mg  650 mg Oral Q6H PRN Or  
 acetaminophen (TYLENOL) suppository 650 mg  650 mg Rectal Q6H PRN  polyethylene glycol (MIRALAX) packet 17 g  17 g Oral DAILY PRN  promethazine (PHENERGAN) tablet 12.5 mg  12.5 mg Oral Q6H PRN Or  
 ondansetron (ZOFRAN) injection 4 mg  4 mg IntraVENous Q6H PRN No Known Allergies Objective: 
Vitals:   
Vitals:  
 11/18/20 1549 11/18/20 2102 11/18/20 2302 11/19/20 0430 Pulse: 85 91 93 95 Resp: 19 20 18 18 Temp: 97.7 °F (36.5 °C) 97.6 °F (36.4 °C) 97.9 °F (36.6 °C) 98.3 °F (36.8 °C) SpO2: 99% 100% 100% 96% Weight:      
Height:      
 
Intake and Output: 
11/18 1901 - 11/19 0700 In: 222 [P.O.:222] Out: 300 [Urine:300] 11/17 0701 - 11/18 1900 In: 1410 [P.O.:1410] Out: 1025 [Urine:1025] Physical Examination: 
General: Morbidly obese, in NAD HEENT: PERRL, EOMI Neck: Supple,no mass palpable Lungs : R basal rales + CVS: RRR, VAD sounds Abdomen: Soft, NT, BS +, obese Extremities: no b/l LE edema Skin: No rash or lesions. MS: No joint swelling, erythema, warmth Neurologic: awake and alert [x]    High complexity decision making was performed [x]    Patient is at high-risk of decompensation with multiple organ involvement Lab Data Personally Reviewed: I have reviewed all the pertinent labs, microbiology data and radiology studies during assessment. Recent Labs 11/19/20 
5379 11/18/20 
1777 11/18/20 
0515 11/17/20 
0515 11/17/20 
0510 NA  --   --  140 140  --   
K  --   --  4.8 4.6  --   
CL  --   --  109* 107  --   
CO2  --   --  27 29  --   
GLU  --   --  121* 131*  --   
BUN  --   --  51* 50*  --   
CREA  --   --  2.58* 2.51*  --   
CA  --   --  8.6 8.8  --   
MG  --  2.0  --  2.0  --   
PHOS  --   --   --  2.5*  --   
ALB  --   --  3.0* 2.9*  --   
ALT  --   --  9*  --   --   
INR 2.7*  --  2.6*  --  2.4* Recent Labs 11/19/20 
7524 11/18/20 
6868 11/17/20 
0510 WBC 6.2 6.3 6.9 HGB 8.4* 8.3* 8.1* HCT 28.0* 27.7* 26.4*  
 167 162 No results found for: SDES Lab Results Component Value Date/Time Culture result: NO GROWTH 5 DAYS 11/10/2020 06:42 PM  
 Culture result: No Growth (<1000 cfu/mL) 11/09/2020 12:05 AM  
 Culture result: SCANT Morganella morganii ssp morganii (A) 10/30/2020 11:30 AM  
 Culture result: SCANT Morganella morganii ssp morganii (A) 10/30/2020 11:30 AM  
 Culture result: RARE DIPHTHEROIDS (A) 10/30/2020 11:30 AM  
 
Recent Results (from the past 24 hour(s)) GLUCOSE, POC Collection Time: 11/18/20  7:07 AM  
Result Value Ref Range Glucose (POC) 141 (H) 65 - 100 mg/dL Performed by [facility] GLUCOSE, POC Collection Time: 11/18/20 11:57 AM  
Result Value Ref Range Glucose (POC) 158 (H) 65 - 100 mg/dL Performed by Norman Li (CON) SAMPLES BEING HELD Collection Time: 11/18/20  1:57 PM  
Result Value Ref Range SAMPLES BEING HELD 1LAV,1RED COMMENT Add-on orders for these samples will be processed based on acceptable specimen integrity and analyte stability, which may vary by analyte. IRON PROFILE  Collection Time: 11/18/20  1:58 PM  
 Result Value Ref Range Iron 47 35 - 150 ug/dL TIBC 236 (L) 250 - 450 ug/dL Iron % saturation 20 20 - 50 % FERRITIN Collection Time: 11/18/20  1:58 PM  
Result Value Ref Range Ferritin 411 (H) 8 - 252 NG/ML  
URIC ACID Collection Time: 11/18/20  1:58 PM  
Result Value Ref Range Uric acid 8.2 (H) 2.6 - 6.0 MG/DL  
GLUCOSE, POC Collection Time: 11/18/20  4:19 PM  
Result Value Ref Range Glucose (POC) 144 (H) 65 - 100 mg/dL Performed by Walt Quintanilla GLUCOSE, POC Collection Time: 11/18/20 11:01 PM  
Result Value Ref Range Glucose (POC) 129 (H) 65 - 100 mg/dL Performed by [facility] LACTIC ACID Collection Time: 11/19/20  5:20 AM  
Result Value Ref Range Lactic acid 0.6 0.4 - 2.0 MMOL/L  
PROTHROMBIN TIME + INR Collection Time: 11/19/20  5:20 AM  
Result Value Ref Range INR 2.7 (H) 0.9 - 1.1 Prothrombin time 27.4 (H) 9.0 - 11.1 sec CBC W/O DIFF Collection Time: 11/19/20  5:20 AM  
Result Value Ref Range WBC 6.2 3.6 - 11.0 K/uL  
 RBC 3.06 (L) 3.80 - 5.20 M/uL HGB 8.4 (L) 11.5 - 16.0 g/dL HCT 28.0 (L) 35.0 - 47.0 % MCV 91.5 80.0 - 99.0 FL  
 MCH 27.5 26.0 - 34.0 PG  
 MCHC 30.0 30.0 - 36.5 g/dL  
 RDW 18.5 (H) 11.5 - 14.5 % PLATELET 818 201 - 659 K/uL MPV 10.0 8.9 - 12.9 FL  
 NRBC 0.0 0  WBC ABSOLUTE NRBC 0.00 0.00 - 0.01 K/uL Total time spent with patient:  xxx   min. Care Plan discussed with: 
Patient Family RN Consulting Physician [id number] [facility],      
 
I have reviewed the flowsheets. Chart and Pertinent Notes have been reviewed. No change in PMH ,family and social history from Consult note.  
 
 
Beto Mckeon MD

## 2020-11-19 NOTE — PROGRESS NOTES
The CM received call back from Bouchra Boswell, the patient's daughter- CM provided update that [facility] due to Trilogy needs- Bouchra Boswell has talked to her mother and endorses that her mother wants to go home. Bouchra Boswell asked about F recommendations, etc.- CM explained that AHF will follow-up with family directly, AHF and CM awaiting therapy evaluations for today. Melinda plans to come to [address] [place] [address] will notify F team that daughter plans to come to [place]. CM did discuss the concern that patient lives alone and that the patient will need supervision. Bouchra Navageoff endorses they have financial resources and can hire help- she confirmed that they received the resources provided by this writer. CM did mention [facility] may be an option- they have accepted LVAD patients, Bouchra Navageoff said that it is good to know this is an option. CM notified F NP that daughter plans to come to [place] tomorrow. Bouchra Boswell confirmed patient was open to [facility] prior to admission for home health. [facility] referral has been sent just for clinical review given patient's LVAD and trilogy needs.   
 
Feliberto Lua, MSW

## 2020-11-20 NOTE — PROGRESS NOTES
Transitions of Care: 33% risk of re-admission  
 
 -TBD, home with home health and family hire assistance vs. SNF (IPR unable to accept patient, how too high level) The CM spoke with F NP/MD- they spoke with patient and daughter Layla Co at bedside regarding concerns about patient living home alone, LVAD management, etc.- family and patient are deciding what they would like to do. Patient has verbalized she wants to return home- CM called Niki Noe, spoke with [facility], she confirmed they can continue LVAD education at home and can resume home health services with patient. [facility] accepted patient- will need COVID-19 testing. CM called [facility]- she confirmed ability to accept Trilogy and LVAD, no bed available until Monday however. Ness checked patient's SNF days- per Kecia Kunz, the patient has her 100 days- 20 days covered at 100%, day 21 covered at 80%. Patient has her full 100 days as long as there remains a skilled need for SNF. CM notified NP of above, awaiting family decision. CM also called and left voicemail for Rosalia hSeehan, [facility] liaison, inquiring about personal care/LVAD training through their agency. 12:37 p.m.- CM received call from [facility]- their staff members that were LVAD trained are no longer with the company, however, they are willing to get re-trained.   
 
 
ENMA Sahu

## 2020-11-20 NOTE — DISCHARGE INSTRUCTIONS
Dear Gena King,    Thank you for choosing [address] [place] [address] for your healthcare needs. We strive to provide EXCELLENT care to you and your family. In an effort to explain clearly why you were here in the hospital, I've also written a very brief summary below. Other details including formal diagnosis, medication changes, and follow up appointment recommendations can also be found in this packet. You were admitted for bleeding in yoru leg after a fall due to CHF exacerbation and bleeding from elevated INR for which you were started on blood thinners and monitored closely. You also received care from specialist physicians in the following specialties:  [address] [place] [address]  IP CONSULT TO INFECTIOUS DISEASES  IP CONSULT TO NEPHROLOGY  IP CONSULT TO NEUROLOGY  IP CONSULT TO PALLIATIVE CARE - PROVIDER  IP CONSULT TO HEMATOLOGY  IP CONSULT TO INFECTIOUS DISEASES  IP CONSULT TO GENERAL SURGERY  IP CONSULT TO CARDIOLOGY  IP CONSULT TO PULMONOLOGY    Here are the updates to your medication list:  ** discontinue your Gabapentin, Coreg, aspirin, amlodipine, Levemir, levofloxacin, mexiletine, Detrol. **Start hydralazine, ranolazine  **Reduce your Paxil dose, reduce your Bumex to 1 mg daily    Remember that it is important for you to take your medications exactly as they are prescribed. It is helpful to keep a list of your medication with the names, dosages, and times to be taken in your wallet. Additionally,   - Please make sure to follow up with your primary care physician within 1-2 weeks of discharge for hospital follow up. You also need to follow-up with your advanced heart failure team.  - Please make sure to continue to monitor for: Worsening shortness of breath, worsening abdominal pain, worsening lower extremity swelling, increased falling and return to the Emergency Department with any of these symptoms:   - Please get up slowly from a seated or laying position, avoid falls. - Avoid tobacco, alcohol and other illicit drug use. -Regarding your INR, your level was 3.0 today. Please hold your dose of Coumadin today, and resume tomorrow pending recommendations from your INR team.  Garry Gonzalez should check your INR tomorrow, and then call the advanced heart failure team on call for further recommendations. Make sure to also see your primary care doctor for follow-up. Bring these papers with you and be sure to review your medication list with your doctor. I cannot stress the importance of follow up enough. I've included the information for your follow-up appointments below: Follow-up Information     Follow up With Specialties Details Why Contact Info    Claudia Ku NP Nurse Practitioner In 1 week  [address] [facility]  [phone]      [facility] [address] Cardiology  Call tomorrow for follow up appt, and for repeat INR check  [address] [place]  [phone]          Should you have any fever over 101 degrees for 24 hours, chest pain, shortness of breath, fever, chills, nausea, vomiting, diarrhea, change in mentation, falling, weakness, bleeding, or worsening pain, please seek medical attention immediately. Finally, as your discharging physician, you may be receiving a survey regarding my care. I would greatly value and appreciate your input in the survey as we strive for excellence. If you have any questions, I can be reached at [phone].   Thank you so much again for allowing me to care for you at [facility].    Respectfully yours,  Leighann Miranda MD

## 2020-11-20 NOTE — PROGRESS NOTES
Palliative Medicine Consult Charlie: [phone]) Patient Name: Modesto Bustos YOB: 1950 Date of Initial Consult: November 17, 2020 Reason for Consult: Care Decisions Requesting Provider: Trinity Cooley NP Primary Care Physician: Chantelle Vázquez NP 
 
 SUMMARY:  
Modesto Bustos is a 78 y.o. female admitted on 11/9/2020 from home of note was dc from the hospital 2 days prior to readmission. Pt had a fall injuring left leg prior to admission and was seen in the Ed for the wound and it was packed and the pt was released home. Admitting diagnosis: bleeding of left leg wound, anemia (hgb 6.9), ROCIO, tremors, chronic SCHF NYHA IV, NSVT, metabolic encephalopathy, hyperkalemia, coagulopathy. PMH: LVAD (2017), COPD, afib, systolic heart failure, ICM, chronic LVAD infection, recurrent admission, cellulitis of right lower leg,DM2,  
 
Current medical issues leading to Palliative Medicine involvement include: support with care decions given functional decline and recurrent admission after 2 days at home and per CHF protocol. Social: , one daughter, lives at home alone, daughter lives 2 hours away,  Worked at [employer] before leaving to care for her ill  PALLIATIVE DIAGNOSES:  
1. Hypoalbuminemia 2. Leg wound 3. shortness of breath 4. Lower ext edema bilat 5. ACP 6. Goals of care PLAN:  
1. Pt seen with daughter Ina Alvarado at the bedside. Meeting held along with Dr. Jannis Mcburney and Veronica Majano NP 2. Pt and daughter have a clear understanding of the patient's condition. 3. Patient's condition reviewed and concerns regarding discharge discussed in great detail 4. Presented options for [facility] or home with 24 hour care. Pt also has the option to switch to [facility] 5. All questions and concerns address 6. Pt and daughter asked for time to reflect on the options before making decisions 7. Initial consult note routed to primary continuity provider and/or primary health care team members 8. Communicated plan of care with: Palliative IDTCyndee 192 Team 
 
 GOALS OF CARE / TREATMENT PREFERENCES:  
 
GOALS OF CARE: 
Patient/Health Care Proxy Stated Goals: Prolong life TREATMENT PREFERENCES:  
Code Status: Full Code Advance Care Planning: 
[x] The [facility] Interdisciplinary Team has updated the ACP Navigator with [place] and Patient Capacity Primary Decision Maker: Jen Cortes - Daughter - [phone] Advance Care Planning 11/10/2020 Patient's Healthcare Decision Maker is: - Confirm Advance Directive None Patient Would Like to Complete Advance Directive No  
 
 
Medical Interventions: Full interventions Other Instructions:  
Artificially Administered Nutrition: No feeding tube Other: As far as possible, the palliative care team has discussed with patient / health care proxy about goals of care / treatment preferences for patient. HISTORY:  
 
History obtained from: chart, pt, team, dtr CHIEF COMPLAINT: pt admitted with aforementioned history and issues HPI/SUBJECTIVE: The patient is:  
[x] Verbal and participatory [] Non-participatory due to: No complaints. Wants to go home Clinical Pain Assessment (nonverbal scale for severity on nonverbal patients):  
Clinical Pain Assessment Severity: 0 Duration: for how long has pt been experiencing pain (e.g., 2 days, 1 month, years) Frequency: how often pain is an issue (e.g., several times per day, once every few days, constant) FUNCTIONAL ASSESSMENT:  
 
Palliative Performance Scale (PPS): PPS: 40 PSYCHOSOCIAL/SPIRITUAL SCREENING:  
 
Palliative IDT has assessed this patient for cultural preferences / practices and a referral made as appropriate to needs (Cultural Services, Patient Advocacy, Ethics, etc.) Any spiritual / Faith concerns: [] Yes /  [x] No 
 
Caregiver Burnout: 
[] Yes /  [x] No /  [] No Caregiver Present Anticipatory grief assessment:  
[x] Normal  / [] Maladaptive ESAS Anxiety: Anxiety: 0 
 
ESAS Depression: Depression: 0 REVIEW OF SYSTEMS:  
 
Positive and pertinent negative findings in ROS are noted above in HPI. The following systems were [x] reviewed / [] unable to be reviewed as noted in HPI Other findings are noted below. Systems: constitutional, ears/nose/mouth/throat, respiratory, gastrointestinal, genitourinary, musculoskeletal, integumentary, neurologic, psychiatric, endocrine. Positive findings noted below. Modified ESAS Completed by: provider Fatigue: 1 Drowsiness: 0 Depression: 0 Pain: 0 Anxiety: 0 Nausea: 0 Anorexia: 0 Dyspnea: 0 Stool Occurrence(s): 1 PHYSICAL EXAM:  
 
From RN flowsheet: 
Wt Readings from Last 3 Encounters:  
11/20/20 282 lb 3 oz (128 kg) 11/08/20 200 lb (90.7 kg) 11/07/20 286 lb 9.6 oz (130 kg) Pulse 95, temperature 98.2 °F (36.8 °C), resp. rate 18, height 5' 2\" (1.575 m), weight 282 lb 3 oz (128 kg), SpO2 98 %. Pain Scale 1: Numeric (0 - 10) Pain Intensity 1: 0 Pain Onset 1: acute Pain Location 1: Head 
Pain Orientation 1: Anterior Pain Description 1: Aching Pain Intervention(s) 1: Medication (see MAR) Last bowel movement, if known:  
 
Constitutional: alert, conversant, nad, obese Eyes: pupils equal, anicteric ENMT: no nasal discharge, moist mucous membranes Cardiovascular:  
Respiratory: breathing not labored, symmetric Gastrointestinal: gross Musculoskeletal: no deformity Skin: chronic lvad infection, left leg wound Neurologic: following commands, moving all extremities Psychiatric: full affect, no hallucinations Other: 
 
 
 HISTORY:  
 
Active Problems: 
  Obesity, morbid ([facility] (10/1/2020) Acute on chronic systolic CHF (congestive heart failure) ([facility] (10/27/2020) NICM (nonischemic cardiomyopathy) ([facility] (11/4/2020) Chronic venous insufficiency (11/4/2020) Anemia (11/9/2020) ROCIO (acute kidney injury) ([facility] (11/9/2020) Coagulopathy ([facility] (11/10/2020) Open wound of left lower leg (11/10/2020) Past Medical History:  
Diagnosis Date  Asthma  Cancer ([facility] breast  
 Cancer ([place] [address].)   
 endometrial  
 Congestive heart failure, unspecified  CRI (chronic renal insufficiency)  Depression  Diabetes ([facility]  Hypertension Past Surgical History:  
Procedure Laterality Date  HX HERNIA REPAIR    
 HX HYSTERECTOMY  HX MASTECTOMY History reviewed. No pertinent family history. History reviewed, no pertinent family history. Social History Tobacco Use  Smoking status: Never Smoker  Smokeless tobacco: Never Used Substance Use Topics  Alcohol use: Not Currently No Known Allergies Current Facility-Administered Medications Medication Dose Route Frequency  bumetanide (BUMEX) tablet 1 mg  1 mg Oral DAILY  hydrALAZINE (APRESOLINE) 20 mg/mL injection 10 mg  10 mg IntraVENous Q6H PRN  
 hydrALAZINE (APRESOLINE) tablet 25 mg  25 mg Oral TID  Warfarin - Pharmacy Dosing   Other Rx Dosing/Monitoring  PARoxetine (PAXIL) tablet 20 mg  20 mg Oral DAILY  albuterol-ipratropium (DUO-NEB) 2.5 MG-0.5 MG/3 ML  3 mL Nebulization Q6H PRN  
 cephALEXin (KEFLEX) capsule 250 mg  250 mg Oral BID  ranolazine ER (RANEXA) tablet 1,000 mg  1,000 mg Oral BID  ferrous sulfate tablet 325 mg  325 mg Oral DAILY WITH BREAKFAST  docusate sodium (COLACE) capsule 100 mg  100 mg Oral BID  hydrOXYzine HCL (ATARAX) tablet 10 mg  10 mg Oral TID PRN  
 glucose chewable tablet 16 g  4 Tab Oral PRN  
 glucagon (GLUCAGEN) injection 1 mg  1 mg IntraMUSCular PRN  
 dextrose 10% infusion 0-250 mL  0-250 mL IntraVENous PRN  
 insulin lispro (HUMALOG) injection   SubCUTAneous AC&HS  
  sodium chloride (NS) flush 5-40 mL  5-40 mL IntraVENous Q8H  
 sodium chloride (NS) flush 5-40 mL  5-40 mL IntraVENous PRN  
 acetaminophen (TYLENOL) tablet 650 mg  650 mg Oral Q6H PRN Or  
 acetaminophen (TYLENOL) suppository 650 mg  650 mg Rectal Q6H PRN  polyethylene glycol (MIRALAX) packet 17 g  17 g Oral DAILY PRN  promethazine (PHENERGAN) tablet 12.5 mg  12.5 mg Oral Q6H PRN Or  
 ondansetron (ZOFRAN) injection 4 mg  4 mg IntraVENous Q6H PRN  
 
 
 
 LAB AND IMAGING FINDINGS:  
 
Lab Results Component Value Date/Time WBC 5.1 11/20/2020 05:47 AM  
 HGB 7.9 (L) 11/20/2020 05:47 AM  
 PLATELET 812 (L) 55/96/2214 05:47 AM  
 
Lab Results Component Value Date/Time Sodium 140 11/20/2020 05:47 AM  
 Potassium 4.4 11/20/2020 05:47 AM  
 Chloride 106 11/20/2020 05:47 AM  
 CO2 27 11/20/2020 05:47 AM  
 BUN 53 (H) 11/20/2020 05:47 AM  
 Creatinine 2.60 (H) 11/20/2020 05:47 AM  
 Calcium 8.9 11/20/2020 05:47 AM  
 Magnesium 2.0 11/20/2020 05:47 AM  
 Phosphorus 4.6 11/20/2020 05:47 AM  
  
Lab Results Component Value Date/Time Alk. phosphatase 61 11/20/2020 05:47 AM  
 Protein, total 7.1 11/20/2020 05:47 AM  
 Albumin 3.0 (L) 11/20/2020 05:47 AM  
 Globulin 4.1 (H) 11/20/2020 05:47 AM  
 
Lab Results Component Value Date/Time INR 3.0 (H) 11/20/2020 05:47 AM  
 Prothrombin time 29.9 (H) 11/20/2020 05:47 AM  
 aPTT 46.2 (H) 11/09/2020 06:19 PM  
  
Lab Results Component Value Date/Time Iron 47 11/18/2020 01:58 PM  
 TIBC 236 (L) 11/18/2020 01:58 PM  
 Iron % saturation 20 11/18/2020 01:58 PM  
 Ferritin 411 (H) 11/18/2020 01:58 PM  
  
No results found for: PH, PCO2, PO2 No components found for: [place] Lab Results Component Value Date/Time CK 70 11/04/2020 05:49 AM  
  
 
 
   
 
Total time:45 min Counseling / coordination time, spent as noted above: 40 min 
> 50% counseling / coordination?: y 
 
Prolonged service was provided for  []30 min   []75 min in face to face time in the presence of the patient, spent as noted above. Time Start:  
Time End:  
Note: this can only be billed with 07036 (initial) or 03421 (follow up). If multiple start / stop times, list each separately.

## 2020-11-20 NOTE — PROGRESS NOTES
11/19/20 1954 CPAP/BIPAP  
CPAP/BIPAP Start/Stop Off (See Progress Note) Pt's Home Machine Yes (type/vendor) (Trilogy) Patient has home Trilogy in house to use during her hospital stay. Patient states she is self sufficient. RT offered to help if she needed anything.

## 2020-11-20 NOTE — PROGRESS NOTES
46- assumed care of patient. She is in bed without complaints at this time. 0830- this RN assisted patient out of bed to chair. She required assistance to sit up and sat on the side of the bed to regain balance before standing. She was able to shuffle her feet to the recliner (3 feet away). Tolerated moderately (slight dyspneic), VSS. 
 
1130- patient and daughter voiced their concerns with future care of patient. This RN asked her to place herself on battery to show competence. Patient requested her bag that has her batteries and clips. Daughter told her the batteries are on the charge and not her bag; RN retrieved the batteries as requested. Patient then was able to put her batteries into the clip without complications, but then hooked them up to the backup . Patient stated she did that so she didn't have to take herself off the power module. This RN stated she still needs to place herself on battery to prove she can. Patient did not know how to turn off back up  when batteries were unplugged; daughter struggled with turning it off and stated others have had trouble turning controller off as well. Patient was able to then place herself on battery power; however she was slightly confused when the cords where tangled together and pulled on her driveline \"trying to untangle it\". This RN intervened and assisted her in untangled the cords. Daughter was present the whole time and stated patient does not have trouble normally, but is not in her normal environment. [address] in room discussing plan of care. She stated multiple times, that if patient was discharged home today that 24/7 home care has to be available. Daughter and patient both stated it will not be an issue to arrange care after discharge. Daughter will be staying with patient until 24/7 care is available.  
 
1555- orthostatics completed;laying 86 MAP PI 6, sitting 88 MAP PI 6.2, standing 82 MAP PI 4.6. Patient tolerated well. 65- Dr. Melissa Miranda will discharge home with daughter. HF team aware and okay with discharge. 1715- patient's IV removed without difficulty. Instructions, medications, and follow up went over and questions addressed. Copy of discharge instructions and prescription medications given. Patient dressed and taken to ED entrance by wheelchair.

## 2020-11-20 NOTE — DISCHARGE SUMMARY
Discharge Summary PATIENT ID: Baljeet Montejo MRN: [id number] YOB: 1950 DATE OF ADMISSION: 11/9/2020  4:14 PM   
DATE OF DISCHARGE: 11/20/2020 PRIMARY CARE PROVIDER: Marika Mcmillan NP  
 
DISCHARGING PROVIDER: Fadumo Boyd MD   
To contact this individual call [phone] and ask the  to page. If unavailable ask to be transferred the Adult Hospitalist Department. CONSULTATIONS: IP CONSULT TO ADVANCED HEART FAILURE 
IP CONSULT TO INFECTIOUS DISEASES 
IP CONSULT TO NEPHROLOGY 
IP CONSULT TO NEUROLOGY 
IP CONSULT TO PALLIATIVE CARE - PROVIDER 
IP CONSULT TO HEMATOLOGY 
IP CONSULT TO INFECTIOUS DISEASES 
IP CONSULT TO GENERAL SURGERY 
IP CONSULT TO CARDIOLOGY 
IP CONSULT TO PULMONOLOGY PROCEDURES/SURGERIES: * No surgery found * ADMITTING DIAGNOSES & HOSPITAL COURSE:  
Chronic systolic heart failure status post LVAD Acute kidney injury on chronic kidney disease Acute on chronic anemia Chronic sternal wound infection Type 2 diabetes Per H and P  
78 y. o. female with past medical history significant for chronic systolic heart failure, ischemic cardiomyopathy with LVAD in place, hypertension, diabetes mellitus type 2 presented emergency room with increased fatigue and fall.  Patient was recently admitted to the hospital and discharged on November 7, 2020 for similar hospital after being treated for cellulitis of the right lower extremity.  Daughter states that since he was discharged patient has been more fatigue and having involuntary movement of her body.  During that hospital stay she was started on 2 new medications including Levaquin and mexiletine.  Daughter said that yesterday she fell at home injuring her left leg.  She has an open wound and to the emergency room. Stefani Jose states that she had wound packed and sent home. Rico Rockwell daughter noticed that the wound started bleeding significantly for which she came to the emergency room.  In the emergency room her work-up showed a hemoglobin of 6.9 down from8.  As well she was found to have with worsening renal function.  Admission was requested for ROCIO and anemia. In the ed wound was significantly bleeding. ED physician stopped bleeding with quick clot and its currently wrapped.  
 
Patient was admitted and hospital stay was complicated by acute kidney injury, required down titrating her Bumex. There was concern for patient being able to care for self at home without 24-hour day supervision. Some concern that she would be able to attend her LVAD and change her batteries appropriately. Daughter is willing to stay with her in her home, provide support until they can hire help. She had some involuntary movements on admission which have resolved after discontinuing her gabapentin and reducing her home Paxil dose. 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  She is off aspirin due to her bleeding on admission. Her INR is at 3 today, she should check her INR at home tonight hold the dose tonight and then discuss with advanced heart failure team on-call over the weekend. DISCHARGE DIAGNOSES / PLAN:   
 
Chronic systolic heart failure NYHA class IV on admit and DC, ischemic cardiomyopathy status post LVAD 
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 On bumex 1mg daily INR stable, continue anticoagulation, hold tonight. Not on Entresto ACE inhibitor, ARB is due to CKD 
-Advanced heart failure following 
-Nuclear scan showed cardiac amyloidosis,hematology consulted. 
  
Acute on CKD stage 3: stable 
-ATN secondary to hypovolemia due to bleed 
-nephrology and Advanced heart failure following   
-she is on oral bumex 1 mg - Will need weekly labs post DC, and follow up with Dr. Fox Moore 
  
Acute on chronic anemia: Likely secondary to bleed from leg wound -s/p 3 unit pRBCs 
-no other source of bleeding identified -s/p general surgery consult with bedside debridement 11/10 
-Xray negative for fracture Completed course of  cefepime-  switch to keflex  
-Hb stable. 
  
Right leg wound, s/p fall prior to admisison 
  
NSVT: improved 
-previously on mexiltine but patient reported nausea/GERD, now DC 
-EP consulted-start  Ranexa 
  
Chronic LVAD infection/Sternal wound: Kyle and Tavo  
-ID consulted, resumed keflex, and should continue at home.  
  
Diabetes mellitus type 2: A1c 8.4% Sliding scale insulin before meals and at bedtime,blood glucose controlled 
- hold home levemir  
  
COPD: stable-Continue maintenance inhaled steroids. Bronchodilators as needed. Depression: stable.  paxil dose decreased Morbid obesity: Body mass index is 46.34 kg/m².  OP management Metabolic encephalopathy:resolved 
  
Involuntary movements: resolved -appreciate neurology input 
-gabapentin stopped and paxil dose reduced Hyperkalemia: resolved, due to ROCIO ADDITIONAL CARE RECOMMENDATIONS:  
- Please take all medications as prescribed. Note changes as below. ** discontinue your Gabapentin, Coreg, aspirin, amlodipine, Levemir, levofloxacin, mexiletine, Detrol. **Start hydralazine, ranolazine **Reduce your Paxil dose, reduce your Bumex to 1 mg daily - Please make sure to follow up with your primary care physician within 1-2 weeks of discharge for hospital follow up. You also need to follow-up with your advanced heart failure team. 
- Please make sure to continue to monitor for: Worsening shortness of breath, worsening abdominal pain, worsening lower extremity swelling, increased falling and return to the Emergency Department with any of these symptoms:  
- Please get up slowly from a seated or laying position, avoid falls. - Avoid tobacco, alcohol and other illicit drug use. -Regarding your INR, your level was 3.0 today.   Please hold your dose of Coumadin today, and resume tomorrow pending recommendations from your INR team.  Young Perez should check your INR tomorrow, and then call the advanced heart failure team on call for further recommendations. PENDING TEST RESULTS:  
At the time of discharge the following test results are still pending: None FOLLOW UP APPOINTMENTS:   
Follow-up Information Follow up With Specialties Details Why Contact Alexis Birch NP Nurse Practitioner   [address] [facility] [address] [phone] DIET: Diabetic Diet ACTIVITY: Activity as tolerated WOUND CARE: None EQUIPMENT needed: rollator DISCHARGE MEDICATIONS: 
Current Discharge Medication List  
  
START taking these medications Details  
hydrALAZINE (APRESOLINE) 25 mg tablet Take 1 Tab by mouth three (3) times daily for 30 days. Qty: 90 Tab, Refills: 0  
  
ranolazine ER (RANEXA) 500 mg SR tablet Take 2 Tabs by mouth two (2) times a day for 30 days. Indications: rapid ventricular heartbeat Qty: 120 Tab, Refills: 0 CONTINUE these medications which have CHANGED Details  
bumetanide (BUMEX) 1 mg tablet Take 1 Tab by mouth daily for 30 days. Qty: 30 Tab, Refills: 0 PARoxetine (PAXIL) 20 mg tablet Take 1.5 Tabs by mouth daily for 30 days. Qty: 45 Tab, Refills: 0 CONTINUE these medications which have NOT CHANGED Details  
ferrous sulfate (Iron) 325 mg (65 mg iron) tablet Take 1 Tab by mouth daily (with breakfast). Qty: 30 Tab, Refills: 0  
  
cephALEXin (Keflex) 500 mg capsule Take 1 Cap by mouth two (2) times a day. Qty: 60 Cap, Refills: 0  
  
albuterol sulfate (PROVENTIL IN) Take 1 Puff by inhalation four (4) times daily as needed. budesonide-formoteroL (Symbicort) 160-4.5 mcg/actuation HFAA Take 2 Puffs by inhalation two (2) times daily as needed. hydrOXYzine HCL (ATARAX) 10 mg tablet Take 10 mg by mouth three (3) times daily as needed for Itching (hives). warfarin (COUMADIN) 1 mg tablet Take 4 mg by mouth daily. STOP taking these medications  
  
 insulin detemir U-100 (Levemir U-100 Insulin) 100 unit/mL injection Comments:  
Reason for Stopping:   
   
 levoFLOXacin (Levaquin) 750 mg tablet Comments:  
Reason for Stopping:   
   
 mexiletine (MEXITIL) 150 mg capsule Comments:  
Reason for Stopping:   
   
 carvediloL (COREG) 25 mg tablet Comments:  
Reason for Stopping:   
   
 magnesium oxide (MAG-OX) 400 mg tablet Comments:  
Reason for Stopping:   
   
 ergocalciferol (ERGOCALCIFEROL) 1,250 mcg (50,000 unit) capsule Comments:  
Reason for Stopping:   
   
 icosapent ethyL (VASCEPA) 1 gram capsule Comments:  
Reason for Stopping:   
   
 amLODIPine (NORVASC) 5 mg tablet Comments:  
Reason for Stopping:   
   
 tolterodine (DETROL) 2 mg tablet Comments:  
Reason for Stopping:   
   
 gabapentin (NEURONTIN) 300 mg capsule Comments:  
Reason for Stopping:   
   
 aspirin 81 mg tablet Comments:  
Reason for Stopping:   
   
  
 
 
 
NOTIFY YOUR PHYSICIAN FOR ANY OF THE FOLLOWING:  
Fever over 101 degrees for 24 hours. Chest pain, shortness of breath, fever, chills, nausea, vomiting, diarrhea, change in mentation, falling, weakness, bleeding. Severe pain or pain not relieved by medications. Or, any other signs or symptoms that you may have questions about. DISPOSITION: 
X  Home With: 
 OT X PT X HH  RN  
  
 Long term SNF/Inpatient Rehab Independent/assisted living Hospice Other:  
 
 
PATIENT CONDITION AT DISCHARGE:  
 
Functional status X Poor Deconditioned Independent Cognition X  Lucid Forgetful Dementia Catheters/lines (plus indication) Fletcher PICC   
 PEG   
X None Code status X Full code DNR   
 
PHYSICAL EXAMINATION AT DISCHARGE: 
Visit Vitals Pulse 95 Temp 98.2 °F (36.8 °C) Resp 18 Ht 5' 2\" (1.575 m) Wt 128 kg (282 lb 3 oz) SpO2 98% BMI 51.61 kg/m² Gen: NAD, awake in bed HEENT: NC/AT, sclera anicteric, PERRL, EOMI CV: VAD humm, no pedal edema Resp: CTA b/l no increased work of breathing, no wheezing or rhonchi, speaking in full sentences Abd: NT/ND, normal bowel sounds, no rebound or guarding Ext: 2+ pulses, no edema Skin: No rashes or lesions CHRONIC MEDICAL DIAGNOSES: 
Problem List as of 11/20/2020 Date Reviewed: 10/28/2020 Codes Class Noted - Resolved Coagulopathy ([facility] ICD-10-CM: G76.3 ICD-9-CM: 286.9  11/10/2020 - Present Open wound of left lower leg ICD-10-CM: X72.758C ICD-9-CM: 891.0  11/10/2020 - Present Anemia ICD-10-CM: D64.9 ICD-9-CM: 285.9  11/9/2020 - Present ROCIO (acute kidney injury) ([facility] ICD-10-CM: N17.9 ICD-9-CM: 584.9  11/9/2020 - Present NSVT (nonsustained ventricular tachycardia) ([facility]) ICD-10-CM: I47.2 ICD-9-CM: 427.1  11/6/2020 - Present NICM (nonischemic cardiomyopathy) ([facility] ICD-10-CM: I42.8 ICD-9-CM: 425.4  11/4/2020 - Present Coronary artery disease involving native coronary artery of native heart without angina pectoris ICD-10-CM: I25.10 ICD-9-CM: 414.01  11/4/2020 - Present JED on CPAP ICD-10-CM: G47.33, Z99.89 ICD-9-CM: 327.23, V46.8  11/4/2020 - Present Chronic venous insufficiency ICD-10-CM: I87.2 ICD-9-CM: 459.81  11/4/2020 - Present Ulcer of right foot, limited to breakdown of skin ([facility] ICD-10-CM: U18.809 ICD-9-CM: 707.15  11/4/2020 - Present Acute on chronic systolic CHF (congestive heart failure) ([facility]) ICD-10-CM: D80.29 ICD-9-CM: 428.23, 428.0  10/27/2020 - Present Obesity, morbid ([facility] ICD-10-CM: E66.01 
ICD-9-CM: 278.01  10/1/2020 - Present Greater than 30 minutes were spent with the patient on counseling and coordination of care Signed:  
Brenda Arias MD 
11/20/2020 
4:04 PM

## 2020-11-20 NOTE — PROGRESS NOTES
[address] in [place], [place] Inpatient Progress Note Patient name: Nataly Murphy Patient : 1950 Patient MRN: [id number] Attending MD: Gladys Da Silva I* Date of service: 20 CHIEF COMPLAINT: 
Cellulitis 
  
24 hr events MAPs 88-100s mmHg Tremors resolved No ectopy on ranolazine Wants to go home Family meeting today PLAN: 
Continue current device speed 9400rpm; intolerant to higher speed due to VT Expect increasing intolerance to BB/ACEi/ARB/ARNI due to aTTR; on hold for now Application for patient assistance for tafamidis as OP Hydralazine 25mg po TID Hydralazine 10mg prn IV Q6hr, maintain MAP 70-90mmHg Intolerant of spironolactone due to hyperkalemia  
Continue Bumex 1mg daily;  Diuretic management per nephrology Coumadin per pharmacy; goal INR lowered to 1.8-2.5; INR 3, hold dose today EP cardiology consult appreciate; no ectopy on ranolazine Transfuse to keep HGB>7 Antibiotics per ID- Keflex po, monitor CBC Use home Trilogy when napping and QHS 
DTC consult appreciated PT/OT- specific recommendations for discharge [facility] pending Nutrition consult appreciated Neurology consult appreciated; held gabapentin with ROCIO 
GI, urogynecology, and pulmonary consults as OP Palliative care consult appreciated; patient remains full code, AMD given to patient/daughter to complete Hematology consult appreciate input; elevated free light chains Pulmonary consult appreciated; assistance with discharge planning- patient uses Trilogy for COPD, no IPR in [facility] will accept Trilogy DEBORAH, accepts V60 BiPAP Case management to assist with discharge planning- No IPR will accept Trilogy machines and LVAD, Discharge TBD- will plan for  
  
Family meeting with daughter Brianna Horton) today; [facility] (Dr. Grabiel Baker.  Maryann Manriquez NP) and Palliative care Quoc Hall NP) in attendance  to discuss goals of care and discharge planning. Noted that Michelet Glaser had difficulty with LVAD exchanges with PT/OT, high risk falls, decreased insight into her deficits, decreased awareness of need for safety. Team recommending 24/7 care upon discharge either home with 24/7 caregiver support or to SNF. Time given to ask and answer questions. Michelet Glaser and Adonis Siegel would like to think about options and get back to team.   
 
IMPRESSION: 
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 
C/b cardiogenic shock s/p Impella bridge to LVAD S/p HeartMate 2 LVAD implantation (4/5/17 by Dr. Nadia Kelley) · C/b delayed extubation due to severe COPD 
· C/b critical illness polyneuropathy · C/b prolonged hospitalization post-LVAD, 55 days · C/b sternal wound infection s/p debridement (by Dr. Emeka Adan) s/p wound vac · C/b sacral ulcer · Would culture positive for Staph aureus, not MRSA · C/b LVAD site drainage, improved S/p CRT-ICD · ICD fired due to electrolyte imbalance (2011) H/o breast cancer (1992) · s/p bilateral mastectomy/chemo and endometrial cancer s/p hysterectomy · Lymphedema of LLE due to cancer treatment Severe COPD with FEV1 50% Depression Atrial fibrillation H/o \"two mini strokes\" S/p fall with hip facture · Right hip hemmiarthroplasty (5/23/18) by Dr. Mónica Cortez) · S/p removed hip hardwarare due to pain (4/15/19) COPD severe CKD, stage 3 Hyperkalemia Pulmonary hypertension Cardiac risk factors: · Morbid obesity, Body mass index is 53.63 kg/m². · DM2 insulin dependent · JED on CPAP 
· HL 
 Urinary incontinence, severe · no procedures due to anticoagulation · conservative management with Detrol 4 pills bid Endometrial cancer () HTN 
HL 
aTTR amyloidosis  
Left leg wound following fall Encephalopathy Renal failure  
  
CARDIAC IMAGING: 
Echo (19) LVEF 20-25%, AV opens 1:1, no AR Echo (18) LVEF 10-%, ramps study done, report in [facility] Echo (18) ramp study done, LVEDD 7.1cm LHC (18) 2 vessel disease with 90% OM, 80% PDA, DSA to PDA branch 
TTE (10/27/20) LVEF 15-20% LVIDd 7.26cm RVIDd 2.82cm Tapse 1.14cm 
  
HEMODYNAMICS: 
RHC 11/3 shows CVP 14 mmHg, PA 36/18 mmHg, PCWP 15 mmHg, Sal CI 2.4 
CPEST not done 6MW not done 
  
OTHER IMAGING: 
EGD/C-scope (19) no active bleeding and polyp removed. 
  
INTERVAL HISTORY: 
Doing well Feels great Labs improving 
  
FAMILY HISTORY: 
Mother  76 years, diagnosed with DM, HTN, CAD Father  [de-identified]years old, HTN, CAD, MI 
Positive for DM and heart disease in the family, brother with valve replacement 
  
SOCIAL HISTORY: 
Never alcohol, never smoked, , lives alone, no illicit drug use, lives in house, ambulatory status indpendedn, children: daughter, occupation retired Lives alone. LVAD INTERROGATION: 
Device interrogated in person Device function normal, normal flow, no events LVAD Pump Speed (RPM): 9400 Pump Flow (LPM): 4.5 MAP: 78 
PI (Pulsitility Index): 6.5 Power: 5.8  Test: Yes 
Back Up  at Bedside & Labeled: Yes Power Module Test: Yes Driveline Site Care: No 
Driveline Dressing: Clean, Dry, and Intact Outpatient: No 
MAP in Therapeutic Range (Outpatient): Yes Testing Alarms Reviewed: Yes 
Back up SC speed: 9400 Back up Low Speed Limit: 9200 Emergency Equipment with Patient?: Yes Emergency procedures reviewed?: Yes Drive line site inspected?: No 
Drive line intergrity inspected?: Yes Drive line dressing changed?: No 
 
PHYSICAL EXAM: 
Visit Vitals Pulse 95 Temp 98.2 °F (36.8 °C) Resp 18 Ht 5' 2\" (1.575 m) Wt 282 lb 3 oz (128 kg) SpO2 98% BMI 51.61 kg/m² General: Patient is well developed, well-nourished in no acute distress HEENT: Normocephalic and atraumatic. No scleral icterus. Pupils are equal, round and reactive to light and accomodation. No conjunctival injection. Oropharynx is clear. Neck: Supple. No evidence of thyroid enlargements or lymphadenopathy. JVD: Cannot be appreciated Lungs: Breath sounds are equal and clear bilaterally. No wheezes, rhonchi, or rales. Heart: Regular rate and rhythm with normal S1 and S2. No murmurs, gallops or rubs. +ICD DARRELL chest  
Abdomen: Soft, obese, no mass or tenderness. No organomegaly or hernia. Bowel sounds present. Genitourinary and rectal: deferred Extremities: No cyanosis, clubbing, + edema. Neurologic:  +hand tremors Psychiatric: Awake, alert and oriented x 3. Appropriate mood and affect. Skin: Redness, ruddiness and trace edema, LLE wound s/p fall, skin breakdown RLE. BLE covered with ace wraps 
  
 
REVIEW OF SYSTEMS: 
General: Denies fever, night sweats. Ready to go home Ear, nose and throat: Denies difficulty hearing, sinus problems, runny nose, post-nasal drip, ringing in ears, mouth sores, loose teeth, ear pain, nosebleeds, sore throate, facial pain or numbess Cardiovascular: see above in the interval history Respiratory: Denies cough, wheezing, sputum production, hemoptysis. Gastrointestinal: Denies heartburn, constipation, intolerance to certain foods, diarrhea, abdominal pain, nausea, vomiting, difficulty swallowing, blood in stool Kidney and bladder: Denies painful urination, frequent urination Musculoskeletal: Denies joint pain, +muscle weakness Skin and hair: Denies change in existing skin lesions, hair loss or increase, breast changes PAST MEDICAL HISTORY: 
Past Medical History:  
Diagnosis Date  Asthma  [facility] [address] [facility]  
 [facility]  
 Congestive heart failure, unspecified  CRI (chronic renal insufficiency)  Depression  Diabetes ([facility] [address]  Hypertension PAST SURGICAL HISTORY: 
Past Surgical History:  
Procedure Laterality Date  HX HERNIA REPAIR    
 HX HYSTERECTOMY  HX MASTECTOMY FAMILY HISTORY: 
History reviewed. No pertinent family history. SOCIAL HISTORY: 
Social History Socioeconomic History  Marital status:  Spouse name: Not on file  Number of children: Not on file  Years of education: Not on file  Highest education level: Not on file Tobacco Use  Smoking status: Never Smoker  Smokeless tobacco: Never Used Substance and Sexual Activity  Alcohol use: Not Currently LABORATORY RESULTS: 
  
Labs Latest Ref Rng & Units 11/20/2020 11/19/2020 11/18/2020 11/17/2020 11/16/2020 11/15/2020 11/14/2020 WBC 3.6 - 11.0 K/uL 5.1 6.2 6.3 6.9 7.7 5.4 5.5  
RBC 3.80 - 5.20 M/uL 2.76(L) 3.06(L) 2.96(L) 2.90(L) 3.09(L) 3.01(L) 2.87(L) Hemoglobin 11.5 - 16.0 g/dL 7. 9(L) 8.4(L) 8. 3(L) 8. 1(L) 8.6(L) 8. 3(L) 8.2(L) Hematocrit 35.0 - 47.0 % 25. 4(L) 28. 0(L) 27. 7(L) 26. 4(L) 28. 4(L) 27. 4(L) 26. 4(L) MCV 80.0 - 99.0 FL 92.0 91.5 93.6 91.0 91.9 91.0 92.0 Platelets 425 - 290 K/uL 149(L) 178 167 162 174 172 164 Lymphocytes 12 - 49 % 16 - - - - - - Monocytes 5 - 13 % 8 - - - - - - Eosinophils 0 - 7 % 10(H) - - - - - - Basophils 0 - 1 % 1 - - - - - - Albumin 3.5 - 5.0 g/dL 3. 0(L) 3. 1(L) 3.0(L) 2. 9(L) 3. 1(L) 2. 9(L) 2. 9(L) Calcium 8.5 - 10.1 MG/DL 8.9 8.9 8.6 8.8 8.9 9.1 8.6 Glucose 65 - 100 mg/dL 152(H) 118(H) 121(H) 131(H) 128(H) 127(H) 130(H) BUN 6 - 20 MG/DL 53(H) 52(H) 51(H) 50(H) 55(H) 59(H) 69(H) Creatinine 0.55 - 1.02 MG/DL 2.60(H) 2.57(H) 2.58(H) 2.51(H) 2.67(H) 2.50(H) 2.69(H) Sodium 136 - 145 mmol/L 140 141 140 140 140 141 142 Potassium 3.5 - 5.1 mmol/L 4.4 4.7 4.8 4.6 5.2(H) 4.9 5.1 TSH 0.450 - 4.500 uIU/mL - - - - - - -  
 LDH 81 - 246 U/L 191 201 246 205 226 217 240 Some recent data might be hidden Lab Results Component Value Date/Time TSH 2.980 10/01/2020 12:00 AM  
 
 
ALLERGY: 
No Known Allergies CURRENT MEDICATIONS: 
 
Current Facility-Administered Medications:  
  bumetanide (BUMEX) tablet 1 mg, 1 mg, Oral, DAILY, Jaspreet Mendoza MD, 1 mg at 11/20/20 7902   hydrALAZINE (APRESOLINE) 20 mg/mL injection 10 mg, 10 mg, IntraVENous, Q6H PRN, Triny Limon, NP, 10 mg at 11/18/20 1753   hydrALAZINE (APRESOLINE) tablet 25 mg, 25 mg, Oral, TID, Gina Jones, NP, 25 mg at 11/20/20 3743   Warfarin - Pharmacy Dosing, , Other, Rx Dosing/Monitoring, Florentino Xavier MD 
  PARoxetine (PAXIL) tablet 20 mg, 20 mg, Oral, DAILY, Albert Akhtar DO, 20 mg at 11/20/20 0846 
  albuterol-ipratropium (DUO-NEB) 2.5 MG-0.5 MG/3 ML, 3 mL, Nebulization, Q6H PRN, Luci Pereira MD, 3 mL at 11/14/20 1549   cephALEXin (KEFLEX) capsule 250 mg, 250 mg, Oral, BID, Isidro Gonzalez MD, 250 mg at 11/20/20 0846 
  ranolazine ER (RANEXA) tablet 1,000 mg, 1,000 mg, Oral, BID, Hitesh Gaston MD, 1,000 mg at 11/20/20 1002   ferrous sulfate tablet 325 mg, 325 mg, Oral, DAILY WITH BREAKFAST, Nelta Louisa M, DO, 325 mg at 11/20/20 0846 
  docusate sodium (COLACE) capsule 100 mg, 100 mg, Oral, BID, Nelta Louisa M, DO, 100 mg at 11/20/20 7242   hydrOXYzine HCL (ATARAX) tablet 10 mg, 10 mg, Oral, TID PRN, CHARLENE Montenegro M, DO 
  glucose chewable tablet 16 g, 4 Tab, Oral, PRN, Nelta Katiana M, DO 
  glucagon (GLUCAGEN) injection 1 mg, 1 mg, IntraMUSCular, PRN, Manjeet KATE, DO 
  dextrose 10% infusion 0-250 mL, 0-250 mL, IntraVENous, PRN, CHARLENE Montenegro M, DO 
  insulin lispro (HUMALOG) injection, , SubCUTAneous, AC&HS, Manjeet KATE, DO, 2 Units at 11/20/20 1303   sodium chloride (NS) flush 5-40 mL, 5-40 mL, IntraVENous, Q8H, Enzo Cortez MD, 10 mL at 11/20/20 7069   sodium chloride (NS) flush 5-40 mL, 5-40 mL, IntraVENous, PRN, Nilson Mina MD 
  acetaminophen (TYLENOL) tablet 650 mg, 650 mg, Oral, Q6H PRN, 650 mg at 11/19/20 1526 **OR** acetaminophen (TYLENOL) suppository 650 mg, 650 mg, Rectal, Q6H PRN, Nilson Mina MD 
  polyethylene glycol (MIRALAX) packet 17 g, 17 g, Oral, DAILY PRN, Nilson Mina MD 
  promethazine (PHENERGAN) tablet 12.5 mg, 12.5 mg, Oral, Q6H PRN **OR** ondansetron (ZOFRAN) injection 4 mg, 4 mg, IntraVENous, Q6H PRN, Nilson Mina MD 
 
PATIENT CARE TEAM: 
Patient Care Team: 
Claudia Ku NP as PCP - General (Nurse Practitioner) Thank you for allowing me to participate in this patient's care. TEA Mcwilliams [address] [place] [address], [address] Phone: [phone] Fax: [phone] [facility] ATTENDING ADDENDUM Patient was seen and examined in person. Data and notes were reviewed. I have discussed and agree with the plan as noted in the NP note above without further additions. Judith Molina MD PhD 
Beatriz Villasenor [address] [place] [address]

## 2020-11-20 NOTE — PROGRESS NOTES
Problem: Self Care Deficits Care Plan (Adult) Goal: *Acute Goals and Plan of Care (Insert Text) Description: FUNCTIONAL STATUS PRIOR TO ADMISSION: Patient was modified independent using a rollator for functional mobility. Patient with recent hospital admission with discharge home however returned s/p GLF with left leg wound. HOME SUPPORT: The patient lived alone with daughter to provide assistance occasionally, however daughter is caring for her baby and is looking into hiring caregivers for patient per  note. Occupational Therapy Goals Initiated 11/13/2020, Weekly goals 11/20/20, goals updated below 1. Patient will perform ADLs standing 5 mins without fatigue or LOB with contact guard assistance within 5 day(s). -met increase to Mod I consistently 2. Patient will perform lower body ADLs with supervision/set-up within 5 day(s) using AE PRN. -continue 3. Patient will perform sponge bathing x supervision/set-up within 5 day(s). -met 4. Patient will perform toilet transfers with contact guard assistance within 5 day(s). -Met, increase to mod I 
5. Patient will perform all aspects of toileting with contact guard assistance within 7 day(s). -met, increase to mod I 
6. Patient will complete LVAD switchover from wall <> portable battery pack with independence within 7 day(s) -continue Outcome: Progressing Towards Goal 
 
OCCUPATIONAL THERAPY TREATMENT Patient: Tianna Hdz (47 y.o. female) Date: 11/20/2020 Diagnosis: Anemia [D64.9] ROCIO (acute kidney injury) ([facility] [address] [N17.9] <principal problem not specified> Precautions: Fall Chart, occupational therapy assessment, plan of care, and goals were reviewed. ASSESSMENT Patient continues with skilled OT services and is progressing towards goals.   Patient demonstrated increased activity tolerance, increased safety awareness during functional transfers and increased independence with basic ADL's. Patient on batteries on arrival and recommend re-assess her changing over. Patient verbalized understanding she needs right brake on rollator fixed. Patient feeling like she is close to baseline and verbalized plan to initially go to her daughter's. Current Level of Function Impacting Discharge (ADLs): mod I to supervision for functional transfers, mod I standing at the sink for grooming. Other factors to consider for discharge: has supportive daughter who is working on finding assist at home PLAN : 
Patient continues to benefit from skilled intervention to address the above impairments. Continue treatment per established plan of care. to address goals. Recommend with staff: toileting schedule and transfer into and out of bathroom Recommend next OT session: reassess her ability to change to battery and back Recommendation for discharge: (in order for the patient to meet his/her long term goals) Occupational therapy at least 2 days/week in the home AND ensure assist and/or supervision for safety with LVAD management, ADL's and IADL's. This discharge recommendation: 
Has been made in collaboration with the attending provider and/or case management IF patient discharges home will need the following DME: needs maintenance on her rollator or new one SUBJECTIVE:  
Patient stated I do all that at home, been doing it for 4 years.  when asked about switching to battery and back OBJECTIVE DATA SUMMARY:  
Cognitive/Behavioral Status: 
Neurologic State: Alert Orientation Level: Oriented X4 Perseveration: No perseveration noted Safety/Judgement: Awareness of environment; Fall prevention;Home safety; Insight into deficits Functional Mobility and Transfers for ADLs: 
Bed Mobility: 
  
 
Transfers: 
Sit to Stand: Supervision Functional Transfers Bathroom Mobility: Supervision/set up(with rollator) Bed to Chair: Supervision Balance: Sitting: Intact; Without support Standing: Impaired; Without support Standing - Static: Good;Constant support Standing - Dynamic : Good;Constant support ADL Intervention: 
  educated on fall prevention, benefit of toileting schedule due to hx of incontinence. Re-educated on need to have brake on rollator fixed Grooming Grooming Assistance: Supervision;Modified independent Position Performed: Standing Washing Face: Modified independent Washing Hands: Modified independent Brushing Teeth: Modified independent No sign or symptoms of shortness of breath Toileting Toileting Assistance: Stand-by assistance Clothing Management: Stand-by assistance Cognitive Retraining Safety/Judgement: Awareness of environment; Fall prevention;Home safety; Insight into deficits Pain: No complaint of pain Activity Tolerance:  
Good After treatment patient left in no apparent distress:  
Sitting in chair, Call bell within reach, and Caregiver / family present COMMUNICATION/COLLABORATION:  
The patients plan of care was discussed with: Registered nurse. ORESTES Kimbrough/BOAZ Time Calculation: 21 mins

## 2020-11-20 NOTE — PROGRESS NOTES
Medicare pt has received, reviewed, and signed 2nd IM letter informing them of their right to appeal the discharge. Signed copied has been placed on pt bedside chart.  
ENMA Mcdonnell

## 2020-11-20 NOTE — PROGRESS NOTES
Pharmacist Note  Warfarin Dosing Consult provided for this 78 y. o.female to manage warfarin for Atrial Fibrillation; LVAD INR Goal: 1.8-2.5 Home regimen/ tablet size: 4 mg daily Drugs that may increase INR: None Drugs that may decrease INR: None Other current anticoagulants/ drugs that may increase bleeding risk: None Risk factors: Age > 72 Daily INR ordered: YES Recent Labs  
  11/20/20 
0547 11/19/20 
0520 11/18/20 
0523 11/18/20 
0515 HGB 7.9* 8.4* 8.3*  --   
INR 3.0* 2.7*  --  2.6* Date               INR                  Dose 11/14       2   4 mg  
11/15              2.1                   4 mg       
11/16              2.2                   4 mg      
11/17              2.4                   4 mg  
11/18              2.6                   2.5 mg    
 11/19             2.7                   2.5 mg           
11/20              3                       hold Assessment/ Plan: Will hold  warfarin today Pharmacy will continue to monitor daily and adjust therapy as indicated. Please contact the pharmacist at h [phone] or  for outpatient recommendations if needed.

## 2020-11-20 NOTE — PROGRESS NOTES
Problem: Breathing Pattern - Ineffective Goal: *Absence of hypoxia Outcome: Resolved/Met Goal: *Use of effective breathing techniques Outcome: Resolved/Met Problem: Patient Education: Go to Patient Education Activity Goal: Patient/Family Education Outcome: Resolved/Met

## 2020-11-20 NOTE — ROUTINE PROCESS
Attempted to schedule [facility] PCP appt with TEA Welch,  informed me that the patient or patient family must call to make appt. [facility] does not service the patients home address.

## 2020-11-20 NOTE — PROGRESS NOTES
[facility] 
 [address] [facility], [facility] [address] [facility] Phone: [phone]  
  
Nephrology Progress Note 
Iraida Cruz     1950     [id number] Date of Admission : 11/9/2020 11/20/20 CC: Follow up for ARF Assessment and Plan ROCIO on CKD 
- resolving ATN  
- continue Bumex 1 mg daily on d/c  
- f/u with me in 1-2 weeks post D/C  
- we can transition her to our CKD clinic in [facility] area eventually   
  
CKD stage III. - baseline around 2 
- presumed 2/2 DM, HTN. Amyloidosis needs to be excluded. 
  
Chronic systolic CHF, stage C NYHA class IV 
-Combined ischemic and nonischemic cardiomyopathy 
-S/p HM 2 LVAD implant 4/15/2017 by Dr. Jina Nielsen at [facility] 
- hx of recurrent VT : off mexiletine - Recent acute on chronic RV failure. - Recent sternal wound infection with staph aureus and Morganella.  
  
Left Calf laceration Blood loss anemia 
-improving Chronic anemia : +ve PYP testing. Consider Bone marrow Bx  
- iron def : Mild Severe COPD. Pulmonary hypertension. Chronic A. fib. History of endometrial Ca ATTR amyloidosis Interval History: 
Seen and examined. No new sx UOP not charted Weights not accurate Overall doing well Got one dose venofer yesterday LLE wounds got debrided Review of Systems: A comprehensive review of systems was negative except for that written in the HPI. Current Medications:  
Current Facility-Administered Medications Medication Dose Route Frequency  iron sucrose (VENOFER) 200 mg in 0.9% sodium chloride 100 mL IVPB  200 mg IntraVENous Q24H  
 bumetanide (BUMEX) tablet 1 mg  1 mg Oral DAILY  hydrALAZINE (APRESOLINE) 20 mg/mL injection 10 mg  10 mg IntraVENous Q6H PRN  
 hydrALAZINE (APRESOLINE) tablet 25 mg  25 mg Oral TID  Warfarin - Pharmacy Dosing   Other Rx Dosing/Monitoring  PARoxetine (PAXIL) tablet 20 mg  20 mg Oral DAILY  albuterol-ipratropium (DUO-NEB) 2.5 MG-0.5 MG/3 ML  3 mL Nebulization Q6H PRN  
 cephALEXin (KEFLEX) capsule 250 mg  250 mg Oral BID  ranolazine ER (RANEXA) tablet 1,000 mg  1,000 mg Oral BID  ferrous sulfate tablet 325 mg  325 mg Oral DAILY WITH BREAKFAST  docusate sodium (COLACE) capsule 100 mg  100 mg Oral BID  hydrOXYzine HCL (ATARAX) tablet 10 mg  10 mg Oral TID PRN  
 glucose chewable tablet 16 g  4 Tab Oral PRN  
 glucagon (GLUCAGEN) injection 1 mg  1 mg IntraMUSCular PRN  
 dextrose 10% infusion 0-250 mL  0-250 mL IntraVENous PRN  
 insulin lispro (HUMALOG) injection   SubCUTAneous AC&HS  sodium chloride (NS) flush 5-40 mL  5-40 mL IntraVENous Q8H  
 sodium chloride (NS) flush 5-40 mL  5-40 mL IntraVENous PRN  
 acetaminophen (TYLENOL) tablet 650 mg  650 mg Oral Q6H PRN Or  
 acetaminophen (TYLENOL) suppository 650 mg  650 mg Rectal Q6H PRN  polyethylene glycol (MIRALAX) packet 17 g  17 g Oral DAILY PRN  promethazine (PHENERGAN) tablet 12.5 mg  12.5 mg Oral Q6H PRN Or  
 ondansetron (ZOFRAN) injection 4 mg  4 mg IntraVENous Q6H PRN No Known Allergies Objective: 
Vitals:   
Vitals:  
 11/19/20 2012 11/19/20 2311 11/20/20 1064 11/20/20 8145 Pulse: 90 85 86 Resp: 18 16 16 Temp: 98.2 °F (36.8 °C) 98.1 °F (36.7 °C) 98.1 °F (36.7 °C) SpO2: 96% 99% 98% Weight:    128 kg (282 lb 3 oz) Height:      
 
Intake and Output: 
No intake/output data recorded. 11/18 1901 - 11/20 0700 In: 80 [P.O.:666; I.V.:110] Out: 600 [Urine:600] Physical Examination: 
General: Morbidly obese, in NAD HEENT: PERRL, EOMI Neck: Supple,no mass palpable Lungs : CTA 
CVS: RRR, VAD sounds Abdomen: Soft, NT, BS +, obese Extremities: no b/l LE edema, legs in dressing Skin: No rash or lesions. MS: No joint swelling, erythema, warmth Neurologic: awake and alert [x]    High complexity decision making was performed [x]    Patient is at high-risk of decompensation with multiple organ involvement Lab Data Personally Reviewed: I have reviewed all the pertinent labs, microbiology data and radiology studies during assessment. Recent Labs  
  11/20/20 0547 11/19/20 0520 11/18/20 0523 11/18/20 
0515  141  --  140  
K 4.4 4.7  --  4.8  
 109*  --  109* CO2 27 27  --  27 * 118*  --  121* BUN 53* 52*  --  51* CREA 2.60* 2.57*  --  2.58* CA 8.9 8.9  --  8.6 MG  --  1.9 2.0  --   
PHOS 4.6  --   --   --   
ALB 3.0* 3.1*  --  3.0* ALT 8* 8*  --  9* INR 3.0* 2.7*  --  2.6* Recent Labs  
  11/20/20 
0547 11/19/20 0520 11/18/20 
3444 WBC 5.1 6.2 6.3 HGB 7.9* 8.4* 8.3* HCT 25.4* 28.0* 27.7*  
* 178 167 No results found for: SDES Lab Results Component Value Date/Time Culture result: NO GROWTH 5 DAYS 11/10/2020 06:42 PM  
 Culture result: No Growth (<1000 cfu/mL) 11/09/2020 12:05 AM  
 Culture result: SCANT Morganella morganii ssp morganii (A) 10/30/2020 11:30 AM  
 Culture result: SCANT Morganella morganii ssp morganii (A) 10/30/2020 11:30 AM  
 Culture result: RARE DIPHTHEROIDS (A) 10/30/2020 11:30 AM  
 
Recent Results (from the past 24 hour(s)) GLUCOSE, POC Collection Time: 11/19/20  7:15 AM  
Result Value Ref Range Glucose (POC) 134 (H) 65 - 100 mg/dL Performed by Sung Thurston, POC Collection Time: 11/19/20 12:00 PM  
Result Value Ref Range Glucose (POC) 176 (H) 65 - 100 mg/dL Performed by [facility] GLUCOSE, POC Collection Time: 11/19/20  5:11 PM  
Result Value Ref Range Glucose (POC) 147 (H) 65 - 100 mg/dL Performed by Jobie Boast GLUCOSE, POC Collection Time: 11/19/20  9:24 PM  
Result Value Ref Range Glucose (POC) 132 (H) 65 - 100 mg/dL Performed by Apoorva Rice METABOLIC PANEL, COMPREHENSIVE Collection Time: 11/20/20  5:47 AM  
Result Value Ref Range  Sodium 140 136 - 145 mmol/L  
 Potassium 4.4 3.5 - 5.1 mmol/L Chloride 106 97 - 108 mmol/L  
 CO2 27 21 - 32 mmol/L Anion gap 7 5 - 15 mmol/L Glucose 152 (H) 65 - 100 mg/dL BUN 53 (H) 6 - 20 MG/DL Creatinine 2.60 (H) 0.55 - 1.02 MG/DL  
 BUN/Creatinine ratio 20 12 - 20 GFR est AA 22 (L) >60 ml/min/1.73m2 GFR est non-AA 18 (L) >60 ml/min/1.73m2 Calcium 8.9 8.5 - 10.1 MG/DL Bilirubin, total 0.5 0.2 - 1.0 MG/DL  
 ALT (SGPT) 8 (L) 12 - 78 U/L  
 AST (SGOT) 11 (L) 15 - 37 U/L Alk. phosphatase 61 45 - 117 U/L Protein, total 7.1 6.4 - 8.2 g/dL Albumin 3.0 (L) 3.5 - 5.0 g/dL Globulin 4.1 (H) 2.0 - 4.0 g/dL A-G Ratio 0.7 (L) 1.1 - 2.2 LACTIC ACID Collection Time: 11/20/20  5:47 AM  
Result Value Ref Range Lactic acid 0.6 0.4 - 2.0 MMOL/L  
LD Collection Time: 11/20/20  5:47 AM  
Result Value Ref Range  81 - 246 U/L  
PROTHROMBIN TIME + INR Collection Time: 11/20/20  5:47 AM  
Result Value Ref Range INR 3.0 (H) 0.9 - 1.1 Prothrombin time 29.9 (H) 9.0 - 11.1 sec NT-PRO BNP Collection Time: 11/20/20  5:47 AM  
Result Value Ref Range NT pro-BNP 3,727 (H) <125 PG/ML  
PHOSPHORUS Collection Time: 11/20/20  5:47 AM  
Result Value Ref Range Phosphorus 4.6 2.6 - 4.7 MG/DL  
CBC WITH AUTOMATED DIFF Collection Time: 11/20/20  5:47 AM  
Result Value Ref Range WBC 5.1 3.6 - 11.0 K/uL  
 RBC 2.76 (L) 3.80 - 5.20 M/uL HGB 7.9 (L) 11.5 - 16.0 g/dL HCT 25.4 (L) 35.0 - 47.0 % MCV 92.0 80.0 - 99.0 FL  
 MCH 28.6 26.0 - 34.0 PG  
 MCHC 31.1 30.0 - 36.5 g/dL  
 RDW 18.2 (H) 11.5 - 14.5 % PLATELET 729 (L) 230 - 400 K/uL MPV 10.3 8.9 - 12.9 FL  
 NRBC 0.0 0  WBC ABSOLUTE NRBC 0.00 0.00 - 0.01 K/uL NEUTROPHILS 64 32 - 75 % LYMPHOCYTES 16 12 - 49 % MONOCYTES 8 5 - 13 % EOSINOPHILS 10 (H) 0 - 7 % BASOPHILS 1 0 - 1 % IMMATURE GRANULOCYTES 1 (H) 0.0 - 0.5 % ABS. NEUTROPHILS 3.3 1.8 - 8.0 K/UL  
 ABS. LYMPHOCYTES 0.8 0.8 - 3.5 K/UL ABS. MONOCYTES 0.4 0.0 - 1.0 K/UL  
 ABS. EOSINOPHILS 0.5 (H) 0.0 - 0.4 K/UL  
 ABS. BASOPHILS 0.0 0.0 - 0.1 K/UL  
 ABS. IMM. GRANS. 0.0 0.00 - 0.04 K/UL  
 DF AUTOMATED    
GLUCOSE, POC Collection Time: 11/20/20  6:45 AM  
Result Value Ref Range Glucose (POC) 143 (H) 65 - 100 mg/dL Performed by [facility] Total time spent with patient:  xxx   min. Care Plan discussed with: 
Patient Family RN Consulting Physician [address] [facility],      
 
I have reviewed the flowsheets. Chart and Pertinent Notes have been reviewed. No change in PMH ,family and social history from Consult note.  
 
 
Jossue Ace MD

## 2020-11-20 NOTE — PROGRESS NOTES
Problem: Mobility Impaired (Adult and Pediatric) Goal: *Acute Goals and Plan of Care (Insert Text) Description: FUNCTIONAL STATUS PRIOR TO ADMISSION: Patient was modified independent using a rollator for functional mobility. HOME SUPPORT PRIOR TO ADMISSION: The patient lived alone with daughter available to provide assistance. Physical Therapy Goals Initiated 11/13/2020 1. Patient will move from supine to sit and sit to supine , scoot up and down, and roll side to side in bed with modified independence within 7 day(s). 2.  Patient will transfer from bed to chair and chair to bed with modified independence using the least restrictive device within 7 day(s). 3.  Patient will perform sit to stand with modified independence within 7 day(s). 4.  Patient will ambulate with modified independence for 150 feet with the least restrictive device within 7 day(s). Outcome: Progressing Towards Goal 
 PHYSICAL THERAPY TREATMENT: WEEKLY REASSESSMENT Patient: Reece Ramirez (71 y.o. female) Date: 11/20/2020 Primary Diagnosis: Anemia [D64.9] ROCIO (acute kidney injury) ([facility] [N17.9] Precautions:  Fall ASSESSMENT Patient continues with skilled PT services and is progressing towards goals. RN assisted with LVAD switchover. Received patient sitting up in the chair, on battery power, dtr in the room. Discussed w/ pt and her dtr, patient's desire to return home. Daughter plans to stay with patient until additional help is in place in the home. On assessment today, patient is demonstrating overall improvement in mobility as compared with initial evaluation. She is no longer presenting with tremors, able to perform sit<->stand and ambulate with SBA/ supervision. She continues to be most limited by her decreased endurance and fall risk when fatigued.  She tolerated 100 ft ambulation with one standing rest break at which time she leaned forward onto her unlocked rollator followed by rushed walking back to her room. Educated pt in her fall risk with this and with the malfunctioning rollator wheel and brake. Pt's daughter plans to purchase a new rollator. Agree that home with caregiver assist and home health therapy to address endurance, safety concerns and endurance. Therapy will continue to follow. Patient's progression toward goals since last assessment: Ambulating 100 ft w/ CGA with one standing rest; no longer having tremors Current Level of Function Impacting Discharge (mobility/balance): Decreased activity tolerance: SOB w/ activity, elevated HR (130's) rest break Functional Outcome Measure: The patient scored 19/28 on the Tinetti outcome measure which is indicative of moderate fall risk. Other factors to consider for discharge: Daughter planning to stay with her until caregivers are hired and LVAD trained PLAN : 
Goals have been updated based on progression since last assessment. Patient continues to benefit from skilled intervention to address the above impairments. Recommendations and Planned Interventions: gait training, therapeutic exercises, patient and family training/education, and therapeutic activities Frequency/Duration: Patient will be followed by physical therapy:  5 times a week to address goals. Recommendation for discharge: (in order for the patient to meet his/her long term goals) Physical therapy at least 2 days/week in the home AND ensure assist and/or supervision for safety with LVAD switchover and functional mobility This discharge recommendation: 
Has been made in collaboration with the attending provider and/or case management IF patient discharges home will need the following DME: Dtr planning to purchase a new rollator walker SUBJECTIVE:  
Patient stated I'm mad today.   Referring to outcome of care meeting.  
 
OBJECTIVE DATA SUMMARY:  
HISTORY:   
 Past Medical History:  
Diagnosis Date  Asthma  Cancer ([facility] breast  
 Cancer ([facility]   
 endometrial  
 Congestive heart failure, unspecified  CRI (chronic renal insufficiency)  Depression  [facility]  Hypertension Past Surgical History:  
Procedure Laterality Date  HX HERNIA REPAIR    
 HX HYSTERECTOMY  HX MASTECTOMY Personal factors and/or comorbidities impacting plan of care:  
 
Home Situation Home Environment: Private residence # Steps to Enter: 1 One/Two Story Residence: One story Living Alone: Yes Support Systems: Child(ivania)(daughter nearby) Patient Expects to be Discharged to[de-identified] Private residence Current DME Used/Available at Home: Walker, rollator, Raised toilet seat, Shower chair, Grab bars(LVAD; trilogy) Tub or Shower Type: Shower EXAMINATION/PRESENTATION/DECISION MAKING:  
Critical Behavior: 
Neurologic State: Alert Orientation Level: Oriented X4 Cognition: Appropriate decision making, Appropriate for age attention/concentration, Appropriate safety awareness, Follows commands Safety/Judgement: Decreased insight into deficits, Decreased awareness of need for safety Hearing: Auditory Auditory Impairment: None Range Of Motion: 
Decreased, funcitonal 
  
  
  
  
  
  
  
Strength:   
Decreased, functional 
  
  
  
  
  
  
  
  
  
  
  
  
   
Coordination: 
Functional (writer did not observed LVAD switchover) Functional Mobility: 
Bed Mobility: N/t - sitting up pre and post session Transfers: 
Sit to Stand: Stand-by assistance to gil LVAD belt with cues to sit for safety. Stand to Sit: Supervision Balance:  
Sitting: Intact; Without support Standing: Impaired; Without support Standing - Static: Good;Constant support Standing - Dynamic : Good;Constant support Ambulation/Gait Training: 
Distance (ft): 100 Feet (ft)(one standing rest break leaning on walker) Assistive Device: Gait belt;Walker, rollator Ambulation - Level of Assistance: Contact guard assistance;Stand-by assistance Gait Abnormalities: Decreased step clearance Base of Support: Widened Step Length: Right shortened;Left shortened Educated in benefit of paced activity and taking rest breaks when fatigued vs rushing to return to her room. Took one standing rest break leaning forward onto the walker. Educated in fall risk with this. Observed right walker wheel malfunctioning in addition to brake not engaging. Made pt and her dtr aware. Dtr to purchase a new walker. Functional Measure: 
Tinetti test: 
 
Sitting Balance: 1 Arises: 1 Attempts to Rise: 2 Immediate Standing Balance: 2 Standing Balance: 1 Nudged: 1 Eyes Closed: 0(inferred) Turn 360 Degrees - Continuous/Discontinuous: 1 Turn 360 Degrees - Steady/Unsteady: 1(w/ walker) Sitting Down: 1 Balance Score: 11 Balance total score Indication of Gait: 1 
R Step Length/Height: 1 L Step Length/Height: 1 
R Foot Clearance: 1 L Foot Clearance: 1 Step Symmetry: 1 Step Continuity: 1 Path: 1 Trunk: 0 Walking Time: 0 Gait Score: 8 Gait total score Total Score: 19/28 Overall total score Tinetti Tool Score Risk of Falls 
<19 = High Fall Risk 19-24 = Moderate Fall Risk 25-28 = Low Fall Risk Tinetti ME. Performance-Oriented Assessment of Mobility Problems in Elderly Patients. [address]; [id number]. (Scoring Description: PT Bulletin Feb. 10, 1993) Older adults: Moncho Long et al, 2009; n = [address] [facility] elderly evaluated with ABC, FELIZ, ADL, and IADL) · Mean FELIZ score for males aged 69-68 years = 26.21(3.40) · Mean FELIZ score for females age 69-68 years = 25.16(4.30) · Mean FELIZ score for males over 80 years = 23.29(6.02) · Mean FLEIZ score for females over 80 years = 17.20(8.32) Pain Rating: 
Voiced no pain Activity Tolerance:  
Fair, requires rest breaks, and observed SOB with activity After treatment patient left in no apparent distress:  
Sitting in chair, Call bell within reach, Caregiver / family present, and RN arrived into the room COMMUNICATION/EDUCATION:  
The patients plan of care was discussed with: Physical therapy assistant, Registered nurse, and Case management. Fall prevention education was provided and the patient/caregiver indicated understanding., Patient/family have participated as able in goal setting and plan of care. , and Patient/family agree to work toward stated goals and plan of care. Thank you for this referral. 
Lou Siegel, PT Time Calculation: 25 mins

## 2020-11-20 NOTE — PROGRESS NOTES
The CM met with the patient and daughter Saima Barnes at bedside- they are deciding between SNF vs. Home with home health. Patient and family verbalized frustration, they are concerned about COVID-19 exposure at SNF, and have multiple questions regarding getting caregivers trained in the LVAD- they want to speak further with [facility] team, CM notified team of concerns. The patient and family are leaning towards discharging home with additional support/help- daughter Saima Barnes endorses she could stay with patient until the hired help could be in-place, CM suggested possible [facility] or [employer] [facility] (both would need refreshing on LVAD training/ [employer] [facility] would need to be re-trained, their LVAD staff are no longer with the company). CM notified [facility] of patient/family questions. CM also called [facility]- asked them to call CM back regarding PT/OT benefits. 13:15 p.m.- CM received call back from [facility] with Surinder Dwyer- endorses patient does have PT benefits through [address] 15:26 p.m.- CM met with patient and daughter at bedside- patient and daughter Saima Barnes prefer to go home, Saima Barnes endorses that she will be willing to stay with patient to provide support even up to 10 days until help is hired. She asked if her mother has to stay in the hospital until help is hired- will need to defer to MD. Saima Barnes endorses she will be with the patient upon discharge, and willing to hire additional help. Family would also like home health services- Surinder Dwyer has accepted (will need orders), and CM called intake in the case patient discharges over-the-weekend- if patient discharges over-the-weekend, CM will need to call main number and ask to speak with RN on call- will need to update referral with orders. CM notified NP of above- Saima Barnes endorses she will be staying with the patient, actively working on hiring additional in-home help, also has information on [employer] as an option. 16:04 p.m.- The CM notified NP of above- endorses that if patient wants to discharge medically okay to discharge, patient will need the 24/7-  
 
CM met with the patient and daughter at bedside- CM again reiterated the need for 24/7 care, patient wants to discharge home. Rema Navarro endorses she will stay with patient until the help is hired- RN also present. CM called [facility]- also faxed orders via [employer]- they will see patient on 11/21 for Alec. CM made [facility] NP aware and Hospitalist- daughter endorses that she will be with patient, working on hiring additional help- aware that she will need to be with patient until help is hired. [facility]-spoke with Marilin, confirmed receipt of orders and all necessary medication, will see patient 11/21 ENMA Cueto

## 2020-11-23 NOTE — TELEPHONE ENCOUNTER
[facility] Palliative Medicine Office  Nursing Note  [phone]  Fax [phone]      Name:  King Ginette  YOB: 1950    Received outpatient Palliative Medicine referral from Dariela Head RN, [facility] coordinator to see pt for symptom management and supportive care. The referral was sent while pt was in the hospital.  After the outpatient referral was ordered, an inpatient Palliative consult was ordered and pt was seen prior to her hospital discharge by Silvestre Gloria NP with the inpatient Palliative Medicine team.       Chart  reviewed. King Ginette is a 78 y.o. female with chronic systolic heart failure status post LVAD, chronic kidney disease, chronic anemia, chronic sternal wound infection, type 2 diabetes. Pt's most recent hospitalization was 11/9/20-11/20/20 at [facility]. ACP:     No ACP documents on file    Nurse called pt to introduce outpatient Palliative Medicine services and to offer appt. No answer, left message. Nurse called pt's daughter and current caregiver Nesha Vásquez. Nurse explained outpatient Palliative Medicine services and the role of Palliative Medicine (\"extra layer of support\", symptom management, care decisions, improving quality of life, etc.)  Ms. Uli Mosqueda states that they met with Silvestre Gloria NP on 11/20/20. At this time she would like to hold off on scheduling an outpatient Palliative Medicine appt. She states she will call our office if pt would like to schedule and appt.

## 2020-12-04 NOTE — TELEPHONE ENCOUNTER
Telephone Call RE:  Lab Reminder      Outcome:     [x] Patient verbalizes understanding    [] Unable to reach   [] Left message              []     Requesting patient check her INR on Monday      Monique Mcgraw

## 2020-12-08 PROBLEM — Z09 HOSPITAL DISCHARGE FOLLOW-UP: Status: ACTIVE | Noted: 2020-01-01

## 2020-12-08 PROBLEM — R32 INCONTINENCE IN FEMALE: Status: ACTIVE | Noted: 2020-01-01

## 2020-12-08 PROBLEM — Z45.02 ICD (IMPLANTABLE CARDIOVERTER-DEFIBRILLATOR) BATTERY DEPLETION: Status: ACTIVE | Noted: 2020-01-01

## 2020-12-08 PROBLEM — Z95.811 LVAD (LEFT VENTRICULAR ASSIST DEVICE) PRESENT (HCC): Status: ACTIVE | Noted: 2020-01-01

## 2020-12-08 NOTE — TELEPHONE ENCOUNTER
Telephone Call RE:  Lab Reminder      Outcome:     [] Patient verbalizes understanding    [] Unable to reach   [x] Left message              []     Requesting patient check her INR in the AM    [facility]

## 2020-12-08 NOTE — PATIENT INSTRUCTIONS
Medication changes:    Increase hydralazine to 50mg three times daily  Check hold coumadin tonight, INR tomorrow    Please take this to your pharmacy to notify them of the change in medications. Other Recommendations:     Ensure your drinking an adequate amount of water with a goal of 6-8 eight ounce glasses (1.5-2 liters) of fluid daily. Your urine should be clear and light yellow straw colored. If your blood pressure begins to consistently run below 90/60 and/or you begin to experience dizziness or lightheadedness, please contact the [place] [address] at [phone]. Follow up in 1 month with [facility]    Please monitor your weights daily upon waking and after using the bathroom. Keep a written records of your weights and bring to your next appointment. If you have a weight gain of 3 or more pounds overnight OR 5 or more pounds in one week please contact our office. Thank you for allowing us the privilege of being a part of your healthcare team! Please do not hesitate to contact our office at [phone] with any questions or concerns. Virtual Heart Failure [address] invites you to learn more about heart failure and to share your questions, ideas, and experiences with others. Each month, the Heart Failure Support Group features a new educational topic and a guest speaker, followed by an interactive discussion. Our Heart Failure Nurse Navigator will moderate each session. You will be able to participate by phone, tablet or computer through [address] [place] [address]. This support group takes place on the 3rd Thursday of each month from 6:00-7:30PM. All individuals living with heart failure and their caregivers are welcome to join. If you are interested in participating, please contact us at [email or web address] and you will be sent the link to join the [facility].      MD Myrna Little             Needs fuv with dr holloway for [employer] for qt in the next week. Could be swap[facility]   Then fuv with him in Feb   Then npv with me on a morning when Sabra Crook is here with me in march thanks!       12/28/2020 at 10:50 left message for patient to call to schedule as requested by Juan Martinez MD

## 2020-12-08 NOTE — LETTER
12/8/20 Patient: Fam Jordan YOB: 1950 Date of Visit: 12/8/2020 Marlene Zepeda NP 
[address] [place] [id number] VIA Facsimile: [phone] Dear Marlene Zepeda NP, Thank you for referring Ms. Fam Jordan to [address] [place] [address] for evaluation. My notes for this consultation are attached. If you have questions, please do not hesitate to call me. I look forward to following your patient along with you. Sincerely, Momo Betancourt NP

## 2020-12-08 NOTE — Clinical Note
Needs fuv with dr holloway for [employer] for qt in the next week. Could be swapnil  Then fuv with him in Feb  Then npv with me on a morning when [place] is here with me in march thanks!

## 2020-12-08 NOTE — PROGRESS NOTES
[facility] in [place], [facility] Note    Patient name: Raf Pena  Patient : 1950  Patient MRN: [id number]  Date of service: 20    Primary care physician: Meredith Houston NP  Primary [facility] cardiologist: Devan Boogie MD     CHIEF COMPLAINT:  Chronic systolic heart failure     PLAN:  Continue current device speed 9400rpm; intolerant to higher speed due to VT  Expect increasing intolerance to BB/ACEi/ARB/ARNI due to aTTR; on hold for now  Application for patient assistance for tafamidis as OP- will mail to patient to complete  Increase Hydralazine to 50 mg po TID, MAPs today 124-138mmHg  Intolerant of spironolactone due to hyperkalemia   Continue Bumex 1mg daily;  Diuretic management per nephrology  Coumadin per pharmacy; goal INR lowered to 1.8-2.5; INR 4.9, hold dose today  EP cardiology consult appreciate; no ectopy on ranolazine  Antibiotics per ID- continue Keflex po, monitor CBC  Use home Trilogy when napping and QHS  Continue home health for wound care and weekly labs  Continue home PT/OT 3 times weekly  Invitae pending  Will send referral for Nutrition   held gabapentin with ROCIO  Referral for urogynecology; persistent urinary incontinence h/o high doses of detrol,   Follow up with pulmonary re: COPD, trilogy management   Palliative care consult appreciated; patient remains full code, AMD given to patient/daughter to complete  Follow up with Meredith Houston NP re:blood glucose management  Received flu/PNA vaccines at discharge  Patient requires 24hr care giver as discussed at discharge, daughter is staying with patient at this time but has also hired EMT as a caregiver. Will need basic LVAD education.     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 Joe Yoo at [facility])  · C/b delayed extubation due to severe COPD  · C/b critical illness polyneuropathy  · C/b prolonged hospitalization post-LVAD, 55 days  · C/b sternal wound infection s/p debridement (by Dr. Joe Yoo) s/p wound vac  · C/b sacral ulcer  · Would culture positive for Staph aureus, not MRSA  · C/b LVAD site drainage, improved  S/p CRT-ICD  · ICD fired due to electrolyte imbalance ()  H/o breast cancer ()   · s/p bilateral mastectomy/chemo and endometrial cancer s/p hysterectomy  · Lymphedema of LLE due to cancer treatment  Severe COPD with FEV1 50%  Depression  Atrial fibrillation  H/o \"two mini strokes\"  S/p fall with hip facture   · Right hip hemmiarthroplasty (18) by Dr. Puneet Long)  · S/p removed hip hardwarare due to pain (4/15/19)  COPD severe  CKD, stage 3  Hyperkalemia  Pulmonary hypertension  Cardiac risk factors:  · Morbid obesity, Body mass index is 51.94 kg/m².   · DM2 insulin dependent  · JED on Trilogy  · HL  Urinary incontinence, severe  · no procedures due to anticoagulation  · conservative management with Detrol 4 pills bid   Endometrial cancer ()  HTN  HL     CARDIAC IMAGING:  Echo (19) LVEF 20-25%, AV opens 1:1, no AR  Echo (18) LVEF 10-%, ramps study done, report in epic  Echo (18) ramp study done, LVEDD 7.1cm  LHC (18) 2 vessel disease with 90% OM, 80% PDA, DSA to PDA branch     HEMODYNAMICS:  RHC not done  CPEST not done  6MW not done     OTHER IMAGING:  EGD/C-scope (19) no active bleeding and polyp removed.     FAMILY HISTORY:  Mother  76 years, diagnosed with DM, HTN, CAD  Father  [de-identified] years old, HTN, CAD, MI  Positive for DM and heart disease in the family, brother with valve replacement     SOCIAL HISTORY:  Never alcohol, never smoked, , lives alone, no illicit drug use, lives in house, ambulatory status indpendedn, children: daughter, occupation retired  Lives alone.     ALLERGIES: benzodiazepines and quetiapine fumerate (lethargy, resp failure to both)     HISTORY OF PRESENT ILLNESS:  I had the pleasure of seeing Lawyer Machuca in [facility] [address] [place] [address], Lawyer Machuca is a 78 y.o. female with multiple comorbidities listed above presents to our clinic requesting transfer of care from [facility] LVAD program.     INTERVAL HISTORY:  Today, patient presents for hospital follow up accompanied by her daughter. She was recently hospitalized following a fall at home resulting in significant blood loss from a wound in her leg secondary from the fall.       Patient is doing fairly well. She is happy to be home. She arrived in a wheel chair and required 3 people to assist getting on [facility] standing scale. She was fairly dyspneic after obtaining weight.      She reports she is able to walk around her house with w walker and has home PT coming 3 days a week and home OT coming 2 days a week. Patient is unable to walk short distances without dyspnea. Her MAP in clinic is in 138mmHg, she has not had her afternoon hydralazine. Patient reports she can perform home activities without problem. Patient would like to remain independent as long as she can; she lives alone. Her daughter has been staying with her for 24/7 care and has recently hired a care giver trained as an EMT.        Patient denies abdominal bloating or BLE edema. Patient's weight today is 284lbs, she has increased appetite.     Patient denies orthopnea, PND or nocturia. Sleeps in recliner.     Patient denies irregular heart rate or palpitations. ICD has not fired.    Patient denies other cardiac symptoms such as chest pain or leg pain with walking.      Patient is compliant with fluid restriction and taking medications as prescribed. Patient manages her own medications. REVIEW OF SYSTEMS:  General: Denies fever, night sweats. Ear, nose and throat: Denies difficulty hearing, sinus problems, runny nose, post-nasal drip, ringing in ears, mouth sores, loose teeth, ear pain, nosebleeds, sore throate, facial pain or numbess  Cardiovascular: see above in the interval history  Respiratory: Denies cough, wheezing, sputum production, hemoptysis. Gastrointestinal: Denies heartburn, constipation, intolerance to certain foods, diarrhea, abdominal pain, nausea, vomiting, difficulty swallowing, blood in stool  Kidney and bladder: Denies painful urination, frequent urination   Musculoskeletal: Denies joint pain, muscle weakness  Skin and hair: Denies change in hair loss or increase, breast changes Redness and edema, skin breakdown RLE, LLE with wound    PHYSICAL EXAM:  Visit Vitals  BP (!) 138/0 (BP 1 Location: Right arm, BP Patient Position: Sitting) Comment: using forearm and radial pulse   Pulse 89   Temp 98.3 °F (36.8 °C) (Oral)   Resp 24   Ht 5' 2\" (1.575 m)   Wt 284 lb (128.8 kg)   SpO2 97%   BMI 51.94 kg/m²        LVAD   Pump Speed (RPM): 9400  Pump Flow (LPM): 4.7  PI (Pulsitility Index): 7.1  Power: 5.5     General: Patient is morbidly obese in no acute distress  HEENT: Normocephalic and atraumatic. No scleral icterus. Pupils are equal, round and reactive to light and accomodation. No conjunctival injection. Oropharynx is clear. Neck: Supple. No evidence of thyroid enlargements or lymphadenopathy. JVD: Cannot be appreciated   Lungs: Breath sounds are equal and clear bilaterally. No wheezes, rhonchi, or rales. Heart: VAD hum  Abdomen: Soft, pannus, no mass or tenderness. No organomegaly or hernia. Bowel sounds present.   Genitourinary and rectal: deferred  Extremities: No cyanosis, clubbing, Redness and edema, skin breakdown RLE; BLE edema 2+, venous stasis, LLE wrapped with ACE wrap  Neurologic: No focal sensory or motor deficits are noted. Grossly intact. Psychiatric: Awake, alert an doriented x 3. Appropriate mood and affect. Skin: Warm, dry and well perfused. No lesions, nodules or rashes are noted. PAST MEDICAL HISTORY:  Past Medical History:   Diagnosis Date    Asthma     [facility] ([facility]     breast    [facility]     endometrial    Congestive heart failure, unspecified     CRI (chronic renal insufficiency)     Depression     Diabetes (HCC)     Hypertension        PAST SURGICAL HISTORY:  Past Surgical History:   Procedure Laterality Date    HX HERNIA REPAIR      HX HYSTERECTOMY      HX MASTECTOMY         FAMILY HISTORY:  No family history on file. SOCIAL HISTORY:  Social History     Socioeconomic History    Marital status:      Spouse name: Not on file    Number of children: Not on file    Years of education: Not on file    Highest education level: Not on file   Tobacco Use    Smoking status: Never Smoker    Smokeless tobacco: Never Used   Substance and Sexual Activity    Alcohol use: Not Currently       LABORATORY RESULTS:  Labs Latest Ref Rng & Units 11/20/2020 11/19/2020 11/18/2020 11/17/2020 11/16/2020 11/15/2020 11/14/2020   WBC 3.6 - 11.0 K/uL 5.1 6.2 6.3 6.9 7.7 5.4 5.5   RBC 3.80 - 5.20 M/uL 2.76(L) 3.06(L) 2.96(L) 2.90(L) 3.09(L) 3.01(L) 2.87(L)   Hemoglobin 11.5 - 16.0 g/dL 7. 9(L) 8.4(L) 8. 3(L) 8. 1(L) 8.6(L) 8. 3(L) 8.2(L)   Hematocrit 35.0 - 47.0 % 25. 4(L) 28. 0(L) 27. 7(L) 26. 4(L) 28. 4(L) 27. 4(L) 26. 4(L)   MCV 80.0 - 99.0 FL 92.0 91.5 93.6 91.0 91.9 91.0 92.0   Platelets 761 - 613 K/uL 149(L) 178 167 162 174 172 164   Lymphocytes 12 - 49 % 16 - - - - - -   Monocytes 5 - 13 % 8 - - - - - -   Eosinophils 0 - 7 % 10(H) - - - - - -   Basophils 0 - 1 % 1 - - - - - -   Albumin 3.5 - 5.0 g/dL 3. 0(L) 3. 1(L) 3.0(L) 2. 9(L) 3. 1(L) 2. 9(L) 2. 9(L) Calcium 8.5 - 10.1 MG/DL 8.9 8.9 8.6 8.8 8.9 9.1 8.6   Glucose 65 - 100 mg/dL 152(H) 118(H) 121(H) 131(H) 128(H) 127(H) 130(H)   BUN 6 - 20 MG/DL 53(H) 52(H) 51(H) 50(H) 55(H) 59(H) 69(H)   Creatinine 0.55 - 1.02 MG/DL 2.60(H) 2.57(H) 2.58(H) 2.51(H) 2.67(H) 2.50(H) 2.69(H)   Sodium 136 - 145 mmol/L 140 141 140 140 140 141 142   Potassium 3.5 - 5.1 mmol/L 4.4 4.7 4.8 4.6 5.2(H) 4.9 5.1   LDH 81 - 246 U/L 191 201 246 205 226 217 240   Some recent data might be hidden       ALLERGY:  No Known Allergies     CURRENT MEDICATIONS:    Current Outpatient Medications:     hydrALAZINE (APRESOLINE) 25 mg tablet, Take 2 Tabs by mouth three (3) times daily for 30 days. , Disp: 180 Tab, Rfl: 0    bumetanide (BUMEX) 1 mg tablet, Take 1 Tab by mouth daily for 30 days. , Disp: 30 Tab, Rfl: 0    PARoxetine (PAXIL) 20 mg tablet, Take 1.5 Tabs by mouth daily for 30 days. , Disp: 45 Tab, Rfl: 0    ranolazine ER (RANEXA) 500 mg SR tablet, Take 2 Tabs by mouth two (2) times a day for 30 days. Indications: rapid ventricular heartbeat, Disp: 120 Tab, Rfl: 0    ferrous sulfate (Iron) 325 mg (65 mg iron) tablet, Take 1 Tab by mouth daily (with breakfast). , Disp: 30 Tab, Rfl: 0    cephALEXin (Keflex) 500 mg capsule, Take 1 Cap by mouth two (2) times a day., Disp: 60 Cap, Rfl: 0    albuterol sulfate (PROVENTIL IN), Take 1 Puff by inhalation four (4) times daily as needed. , Disp: , Rfl:     budesonide-formoteroL (Symbicort) 160-4.5 mcg/actuation HFAA, Take 2 Puffs by inhalation two (2) times daily as needed. , Disp: , Rfl:     hydrOXYzine HCL (ATARAX) 10 mg tablet, Take 10 mg by mouth three (3) times daily as needed for Itching (hives). , Disp: , Rfl:     warfarin (COUMADIN) 1 mg tablet, Take 4 mg by mouth daily. , Disp: , Rfl:     magnesium oxide (MAG-OX) 400 mg tablet, Take 1 Tab by mouth daily. , Disp: 30 Tab, Rfl: 5    Thank you for your referral and allowing me to participate in this patient's care.     Alida Lopez, TEA  [address] [place] [address], [address]  Phone: [phone]  Fax: [phone]    PATIENT CARE TEAM:  Patient Care Team:  Suni Perkins NP as PCP - General (Nurse Practitioner)     40 min

## 2020-12-09 NOTE — TELEPHONE ENCOUNTER
Reached out to [facility] per KASSIDY Maria NP to request labs be redrawn STAT as only CBC and PT/INR received from 12/3/20 lab draw. According to lab results specimens for ordered CMP, LDH, and NT proBNP were not received and therefore could not be ran. Ara David also requested clarification from home health on patient's MAP as it was brought up by patient they have been checking regular BP. Per KASSIDY Maria NP home health company to be educated MAP must be done on patient's with LVAD to ensure accuracy. She stated that training can be provided by [facility] if needed. Patient's MAP in clinic high yesterday. Additionally, KASSIDY Maria requested home health PT/OT notes to monitor patient's progress. Spoke with Tameka Bellamy with [facility] sent. Informed her of above. She stated she will ensure labs redrawn. In regards to MAP, Tameka Bellamy stated she was unsure if visiting nurse was checking MAPs. Inquired if nursing staff had been trained on taking a MAP. Tameka Bellamy stated, \"Honestly, I don't think so. \" Requested she verify with nursing staff and report back. Informed her that training is available from the [address] [place] [address] team and can be set up to ensure appropriate care of patient. She stated she will clarify with nursing staff and return call. She also stated she will sent PT/OT notes and ensure weekly updates sent. She had no further questions at this time. Per KASSIDY Maria NP [employer]  patient to be contacted to discuss new prescription for tafamidis 61mg by mouth daily for treatment of cardiac amyloid. Contacted patient to discuss. Informed her medication will likely require insurance authorization which will be completed by the [facility]. She was also informed that, even after authorization, the medication may be expensive. Informed her will mail patient assistance application for her to complete and return in the event it is needed.  Requested she also include a copy of financial verification (W2, previous year's tax return, social security letter, or last 3 months of bank statements) as information is needed to determine qualification. She verbalized understanding and had no further questions at this time. Chelsey Ornelas RN.

## 2020-12-09 NOTE — TELEPHONE ENCOUNTER
Spoke with patient's daughter Ganesh Patel release auth verified. She requested the [employer] patient assistance application be emailed to her at [email or web address]. Informed her would send encrypted email with application as well as informational handout on medication. Encouraged her to contact the office with any further questions. She verbalized understanding and had no further questions. Application emailed this date. Sienna Beatty RN.

## 2020-12-09 NOTE — TELEPHONE ENCOUNTER
PT/OT notes from [facility] sent received via fax and given to KASSIDY Santamaria NP for review. Cristhian Pillai RN.

## 2020-12-10 NOTE — TELEPHONE ENCOUNTER
Prior auth for Vyndamax completed via [email or web address] com this date. Will await return response. Julien Don RN.

## 2020-12-10 NOTE — TELEPHONE ENCOUNTER
"Subjective     Andree Kennedy is a 46 year old female who presents to clinic today for the following health issues:    HPI   Back Pain       Duration: Thursday         Specific cause: none    Description:   Location of pain: low back both sides  Character of pain: dull ache, fullness and constant  Pain radiation:radiates into the right leg and radiates into the left leg. Right now tightness in her hips  New numbness or weakness in legs, not attributed to pain:  no - but feeling muscles spasms    Intensity: Currently 7-8/10, At its worst 10+/10    History:   Pain interferes with job: Not applicable. She's doesn't work.   History of back problems: previous degenerative joint disease and previous herniated disc. Years ago though nothing recent.  Any previous MRI or X-rays: Yes-  Sees a specialist for back pain:  No  Therapies tried without relief: biofreeze, high powered hand held massaging unit, ibuprofen, stretching    Alleviating factors:   Improved by: none      Precipitating factors:  Worsened by: Lifting, Bending, Standing, Sitting, Lying Flat and Walking      Accompanying Signs & Symptoms:  Risk of Fracture:  None  Risk of Cauda Equina:  None  Risk of Infection:  None  Risk of Cancer:  None  Risk of Ankylosing Spondylitis:  Onset at age <35, male, AND morning back stiffness. no     -Patient went to [facility] on Wednesday and used the elliptical and bike. She was unsure whether she \"twisted something.\"   -She then woke up on Thursday morning in immense pain. Since then, her pain has slowly improved, but is still very painful.   -Her pain is primarily located in her low back and right buttocks region. She also endorses pain in her thighs with movement. She describes her pain as more of a \"pulling and spasm\" sensation.   -Her pain is aggravated by standing, walking, and twisting motions.   -She denies fevers, chills, numbness, tingling, paresthesias, or LE weakness. She denies loss of bowel/bladder function or saddle " Received an email @ [phone]  from patient's daughter. She states she spoke with Dr. Raina Weeks last evening. She states Dr. Raina Weeks wants her MAP under 110. Patient's MAP was 115 so Hydralazine was increase to 75mg last evening and this morning. She was instructed to call around noon with MAP. MAP this morning was 120. Further pt was instructed to take additional Bumex dose yesterday to to check weight today. She is requesting refill on Keflex 500 (originally ordered by Dr. Melvi Pelaez), Bumex 1mg and Coumadin 5mg. Received 2nd email at [phone] - Map is 110-112. Please advise on Hydralazine.  Weight is down 2.5lbs - aslking if she should return to Bumex 1mg daily ( or continue 2mg in am and 1 mg in PM) paresthesias.   -She denies any recent back trauma.     -Patient endorses a history of back pain in the past that responded to deep heat, massage, tens unit, and chiropractic.     Mood:  -In completing her PHQ-9, patient endorsed having suicidal ideations. She did not want to discuss this further in much detail. She denied any active suicidal ideations and stated that she had resources if she developed these. She sees a therapist weekly.     Patient Active Problem List   Diagnosis     Anxiety state     Headache, post-traumatic     Neck pain     Headaches, tension     Cervical radiculopathy     Neural foraminal stenosis of cervical spine     Cervical spondylosis     Postpartum hypertension     HTN, goal below 140/90     Obesity     Papanicolaou smear of cervix with low grade squamous intraepithelial lesion (LGSIL)     Hyperlipidemia LDL goal <130     Mild episode of recurrent major depressive disorder (H)     Bipolar I disorder (H)     Seasonal affective disorder (H)     Morbid obesity (H)     Chronic seasonal allergic rhinitis     Past Surgical History:   Procedure Laterality Date     NO HISTORY OF SURGERY         Social History     Tobacco Use     Smoking status: Never Smoker     Smokeless tobacco: Never Used   Substance Use Topics     Alcohol use: Yes     Comment: social     Family History   Problem Relation Age of Onset     Cancer - colorectal Mother         polyp removal     Diabetes Father      Cancer Father         Skin     Hypertension Father      Heart Disease Father      Unknown/Adopted Maternal Grandmother      Unknown/Adopted Maternal Grandfather      Unknown/Adopted Paternal Grandmother      Unknown/Adopted Paternal Grandfather          Current Outpatient Medications   Medication Sig Dispense Refill     ALPRAZolam (XANAX PO) Take by mouth every 4 hours as needed for anxiety       cyclobenzaprine (FLEXERIL) 5 MG tablet Take 1-2 tablets (5-10 mg) by mouth 3 times daily as needed for muscle spasms 30 tablet  "0     FLUoxetine (PROZAC) 20 MG capsule TAKE 1 CAPSULE BY MOUTH ONCE DAILY  1     naproxen (NAPROSYN) 500 MG tablet Take 1 tablet (500 mg) by mouth 2 times daily (with meals) 60 tablet 0     Sertraline HCl (ZOLOFT PO) Take 100 mg by mouth daily        VRAYLAR 3 MG CAPS capsule TAKE 1 CAPSULE BY MOUTH ONCE DAILY  1     Lurasidone HCl (LATUDA PO) Take 40 mg by mouth daily        No Known Allergies    Reviewed and updated as needed this visit by Provider  Tobacco  Allergies  Meds  Problems  Med Hx  Surg Hx  Fam Hx         Review of Systems   ROS COMP: Constitutional, GI, , musculoskeletal, neuro, skin, and psych systems are negative, except as otherwise noted.      Objective    BP (!) 132/92   Pulse 85   Temp 98.1  F (36.7  C) (Temporal)   Resp 16   Ht 1.695 m (5' 6.73\")   Wt 109.2 kg (240 lb 12.8 oz)   LMP 10/01/2019 (Exact Date)   SpO2 98%   BMI 38.02 kg/m    Body mass index is 38.02 kg/m .  Physical Exam   GENERAL: healthy, alert and no distress  EYES: Eyes grossly normal to inspection, PERRL and conjunctivae and sclerae normal  RESP: lungs clear to auscultation - no rales, rhonchi or wheezes  CV: regular rate and rhythm, normal S1 S2, no S3 or S4, no murmur, click or rub  MS: TTP in low back (bilateral paraspinal region) and right SI joint. No spinous process tenderness. Lower extremity strength and sensation intact. Patellar and plantar reflex 2+ bilaterally. Left straight leg raise illicited tenderness in right gluteal region. SYLVAIN illicited mild tenderness in SI joints bilaterally. 5/5 lower extremities bilaterally.  Full active ROM of the lumbar spine in all directions with pain elicited with rotation bilaterally.   NEURO: mentation intact and speech normal  PSYCH: mentation appears normal, appropriate affect     Diagnostic Test Results:  Labs reviewed in Epic  none         Assessment & Plan       ICD-10-CM    1. Acute bilateral low back pain without sciatica M54.5 cyclobenzaprine (FLEXERIL) 5 " MG tablet     naproxen (NAPROSYN) 500 MG tablet     [facility]   2. Mild episode of recurrent major depressive disorder (H) F33.0      1. The etiology of the patient's back pain is likely musculoskeletal in origin, as her pain developed after exercise (ellipital and bike). Nerve impingement is less likely as the patient denies any radicular symptoms nor was there any appreciable weakness noted on exam. Cauda equina is less likely as the patient denies any bowel/bladder dysfunction or saddle paresthesia. Systemic illness is less likely, as the patient denies fevers or chills. Advised the patient to take 5-10 mg cyclobenzaprine as needed three times a day for muscle spasms. Informed patient that this medication causes drowsiness and to not take while driving or operating machinery. Also, instructed her to take 500 mg naproxen twice per day with food and monitor for upset stomach and dark tarry stools. Informed her that taking this medication while on SSRIs increases her risk for a upper GI bleed. Informed patient that she can take tyenlol 500 mg every 4-6 hours as needed for pain and to continue with ice, heat, stretching exercises (as provided). Lastly, placed referral for [facility]. Instructed patient to notify clinic in 2-3 weeks if her symptoms do not improve.     2. Patient sees psychiatry and a therapist for management of her depression. She did not want to further discuss her SI or depressive symptoms today. She denied any active thoughts of suicide and endorsed having resources that she can refer to if these develop.     Return in about 2 weeks (around 10/21/2019) for if not improving, sooner if worse .    Patient seen with DILEEP Gill-S3.     I, Barbara Zamudio PA-C, was present with the Physician Assistant student who participated in the service and in the documentation of the note.  I have verified the history and personally performed the physical  exam and medical decision making.  I agree with the assessment and plan of care as documented in the note.     Options for treatment and follow-up care were reviewed with the patient and/or guardian. Patient and/or guardian engaged in the decision making process and verbalized understanding of the options discussed and agreed with the final plan.     Barbara Zamudio PA-C  [facility]    Answers for HPI/ROS submitted by the patient on 10/7/2019   If you checked off any problems, how difficult have these problems made it for you to do your work, take care of things at home, or get along with other people?: Extremely difficult  PHQ9 TOTAL SCORE: 27  MICA 7 TOTAL SCORE: 21

## 2020-12-10 NOTE — TELEPHONE ENCOUNTER
Prior [facility] approval received from [facility]. Contacted [facility] to verify Copay. Copay $60 for 30 day supply. Per Sophia Rosario at the pharmacy medication not available through local pharmacy and [facility] will be reaching out directly to patient. Inquired about copay for 90 day supply. Per [employer] will only authorize 30 days at a time. Contacted patient's daughter Lois Ramos to notify. She stated they would like to proceed with the patient assistance application and did receive it via email. She stated she will complete and return. She had no further questions at this time. Will await completed application. Lorie Williamson RN.

## 2020-12-10 NOTE — TELEPHONE ENCOUNTER
Telephone Call RE:  Lab Reminder      Outcome:     [] Patient verbalizes understanding    [] Unable to reach   [x] Left message              []     Requesting patient check her INR in the AM      [facility]

## 2020-12-10 NOTE — TELEPHONE ENCOUNTER
I called patient's daughter and advised her per KASSIDY James:  Continue hydralazine 75mg three times daily, she can take extra 25mg of hydralazine prn for MAP greater than 110mmHg.  She can resume her normal Bumex dose of 1mg po BID, take extra dose for weight gain 2lbs over night or 5 lbs in 7 days.  Continue daily weights and low Na+ diet. Daughter states \"normal\" daily bumex dose is 1 mg daily-please confirm what dose you would like patient to take. Also, daughter would like refill of Keflex. Please advise, thank you.

## 2020-12-11 NOTE — TELEPHONE ENCOUNTER
Sally Torres NP  You 49 minutes ago (10:24 AM)       Can we call Mrs. Franklin to see how her weight is today on the bumex 1 mg BID?  I sent refill yesterday for her keflex.      Message text      Contacted patient for update. Spoke with patient's daughter Layla Barkley. She stated patient's weight is down another 2lbs today. She stated patient did take bumex 1mg BID yesterday and 2mg in AM and 1mg in PM as advised on Wednesday. She is inquire if patient is to continue on Bumex 1mg BID or reduce back to regular dosing of Bumex 1mg daily as she was previously doing. She reported INR 2.3 today. She is inquiring if patient to continue 2mg of coumadin daily or increase back to previous dosing of 4mg daily. Anticoag tracker forwarded to KASSIDY Quach NP. See telephone anticoag encounter 12/11/20. Layla Barkley then reported that she had an extensive conversation with Dr. Valerie Varghese earlier this week who recommended patient take hydralazine 75mg TID with an additional 25mg for MAP greater than 110. Per Layla Barkley patient's Map has been \"consistently above that,\" at about \"120-122. \" Layla Barkley reported patient has been taking hydralazine 100mg for the last 4 doses. Layla Barkley reported patient \"feels great,\" and denied any swelling or shortness of breath. She is wondering if they should continue the hydralazine 100mg TID. Informed her will review information with provider and will contact her with further recommendations. She verbalized understanding and had no further questions. Routed to KASSIDY Quach NP for review. Fan Anderson RN.

## 2020-12-11 NOTE — TELEPHONE ENCOUNTER
Spoke with patient's daughter who inquired if they would still be eligible for patient assistance if they picked up the first dose of Vyndamax from the pharmacy to trial the medication. She stated they are concerned about receiving too much of the medication as they have had experiences in the past where patient is started on the medication and it is discontinued after only a short period of time. Informed her they will still be eligible to apply for assistance if they receive the first prescription from the pharmacy. Encouraged her to complete the application and will send in when they request. She verbalized understanding and had no further questions. Maria D Alva RN.

## 2020-12-15 NOTE — TELEPHONE ENCOUNTER
Telephone Call RE:  Lab Reminder      Outcome:     [x] Patient verbalizes understanding    [] Unable to reach   [] Left message              []     Requesting patient to check her INR in the AM    [facility]

## 2020-12-16 NOTE — PATIENT INSTRUCTIONS
No Medication changes. Testing Ordered: 
 
Lab work has been drawn today. You will be contacted with any abnormal results requiring changes to your current plan of care. Other Recommendations: [facility] genetic testing results have been reviewed today. Your testing results qualify for complimentary genetic counseling through [facility]. Please visit [email or web address] to schedule your telephone genetic counseling appointment. Your RQ number from your test is [id number]. You will be asked for this number when you schedule the appointment. Your [facility] Genetic Testing results qualify for complimentary family testing for up to two blood relatives. If you would like to proceed with family testing, please contact the [address] [place] [address] at [phone] Option 2 to provide the following information: 
 
-Name of Family Member 
-Date of Birth 
-Home Address 
-Phone Number 
-Email (Optional) -Any cardiac history (Optional) Our office will order the test and a testing kit will be sent directly to the home of your family member. Instructions for specimen collection and shipping material to return the specimen to [employer] will be provided with the kit. This test is complimentary and is at no cost to you or your family members. Ensure your drinking an adequate amount of water with a goal of 6-8 eight ounce glasses (1.5-2 liters) of fluid daily. Your urine should be clear and light yellow straw colored. Follow up in 1 month with Jalil Ward. Please monitor your blood pressures daily prior to medications and 2 hours after taking medications. Bring a written record of your blood pressures to your next appointment. Please monitor your weights daily upon waking and after using the bathroom. Keep a written records of your weights and bring to your next appointment. If you have a weight gain of 3 or more pounds overnight OR 5 or more pounds in one week please contact our office. Thank you for allowing us the privilege of being a part of your healthcare team! Please do not hesitate to contact our office at [phone] with any questions or concerns. [employer] Thank you for requesting access to [employer]. Please follow the instructions below to securely access and download your online medical record. [employer] allows you to send messages to your doctor, view your test results, renew your prescriptions, schedule appointments, and more. How Do I Sign Up? 1. In your internet browser, go to [email or web address]. 2. Click on the First Time User? Click Here link in the Sign In box. You will see the New Member Sign Up page. 3. Enter your [employer] Access Code exactly as it appears below. You will not need to use this code after youve completed the sign-up process. If you do not sign up before the expiration date, you must request a new code. [employer] Access Code: [email or web address] Expires: 2021 10:06 AM (This is the date your [employer] access code will ) 4. Enter the last four digits of your Social Security Number (xxxx) and Date of Birth (mm/dd/yyyy) as indicated and click Submit. You will be taken to the next sign-up page. 5. Create a [employer]ine ID. This will be your [employer] login ID and cannot be changed, so think of one that is secure and easy to remember. 6. Create a [employer] password. You can change your password at any time. 7. Enter your Password Reset Question and Answer. This can be used at a later time if you forget your password. 8. Enter your e-mail address. You will receive e-mail notification when new information is available in [address] 9. Click Sign Up. You can now view and download portions of your medical record. 10. Click the Download Summary menu link to download a portable copy of your medical information. Additional Information If you have questions, please visit the Frequently Asked Questions section of the [employer] website at [email or web address] Exhibition A. The Mother [email or web address] Remember, [employer] is NOT to be used for urgent needs. For medical emergencies, dial 911.

## 2020-12-22 NOTE — TELEPHONE ENCOUNTER
Telephone Call RE:  Lab Reminder      Outcome:     [x] Patient verbalizes understanding    [] Unable to reach   [] Left message              []     Patient will check her INR in the AM    [facility]

## 2020-12-27 NOTE — PROGRESS NOTES
Patient's daughter called the on-call for general cardiology today. She has been unable to obtain refills on her prescriptions from the advanced heart failure team and is little bit frustrated with trying to transition her care over to [facility]. She is a very complicated case with some unusual features including refractory VT requiring mexiletine for management. She was placed on Ranexa after perhaps having side effects to the mexiletine this was monitored during her last hospital visit. However she was discharged home with prescription for Ranexa 1000 mg twice daily and is now out of that and unable to get refills. This was prescribed by Dr. Wilfrido Moya and she does need to have a repeat EKG to check her QT interval.  I will refill it for now make her a appointment sooner. And then verify her upcoming visits with advanced heart failure and with EP clinic. Her daughter is also interested in establishing general cardiology care with me so I will make that arrangement. For now today she needs her Ranexa her albuterol and her iron supplement refilled.

## 2020-12-28 NOTE — TELEPHONE ENCOUNTER
Hilda Armstrong from [facility] called to let Dr Eamon Dumont know that the patient's MAP pressure was 120 today, she stated that it has been in normal range lately but a little high today.     She can be reached at  [phone]

## 2020-12-28 NOTE — PROGRESS NOTES
MD David Salazar, [address] with dr holloway for ekg for qt in the next week. Could be [employer] Then fuv with him in Feb  
Then npv with me on a morning when Remy Canada is here with me in march thanks!    
 
12/28/2020 at 10:50 left message on patients voicemail to please call to schedule appointment with Remy Canada for possibly week of 12/28/2020 and at appointment will need to have EKG for QT - needs to keep scheduled appointments in February 2021 - needs established patient appointment with Dr. Sonya Messina, not new patient due to saw in hospital

## 2020-12-28 NOTE — PROGRESS NOTES
Appointments scheduled with patients daughter. Unable to schedule to see [facility] or Dr. Alec Pollack for week of 12/28/2020. Patients daughter requested to schedule to see Dr. Suresh Marmolejo when seeing Advanced Heart Failure while daughter is in town on 1/7/2021. Future Appointments Date Time Provider Mello Pacheco 1/7/2021 10:00 AM Zen Antunez NP [facility] BS AMB  
1/7/2021  1:00 PM MD ROBE Walker BS AMB  
2/2/2021  3:20 PM [email or web address] BS AMB  
2/2/2021  4:00 PM MD ROBE Walker BS AMB  
2/3/2021  2:50 PM Haleigh Armendariz MD [place]DE [place] [id number] BS AMB  
3/16/2021  2:20 PM MD ROBE Walker BS AMB

## 2020-12-28 NOTE — TELEPHONE ENCOUNTER
Spoke with Ok Rollins from [facility]. She states MAP of 120 was after exertion and not patient's norm. Will continue to monitor.

## 2021-01-01 ENCOUNTER — TELEPHONE ANTICOAG (OUTPATIENT)
Dept: CARDIOLOGY CLINIC | Age: 71
End: 2021-01-01

## 2021-01-01 ENCOUNTER — HOME CARE VISIT (OUTPATIENT)
Dept: SCHEDULING | Facility: HOME HEALTH | Age: 71
End: 2021-01-01
Payer: MEDICARE

## 2021-01-01 ENCOUNTER — APPOINTMENT (OUTPATIENT)
Dept: NON INVASIVE DIAGNOSTICS | Age: 71
DRG: 314 | End: 2021-01-01
Attending: INTERNAL MEDICINE
Payer: MEDICARE

## 2021-01-01 ENCOUNTER — TELEPHONE (OUTPATIENT)
Dept: CARDIOLOGY CLINIC | Age: 71
End: 2021-01-01

## 2021-01-01 ENCOUNTER — APPOINTMENT (OUTPATIENT)
Dept: VASCULAR SURGERY | Age: 71
DRG: 291 | End: 2021-01-01
Attending: NURSE PRACTITIONER
Payer: MEDICARE

## 2021-01-01 ENCOUNTER — DOCUMENTATION ONLY (OUTPATIENT)
Dept: CARDIOLOGY CLINIC | Age: 71
End: 2021-01-01

## 2021-01-01 ENCOUNTER — APPOINTMENT (OUTPATIENT)
Dept: GENERAL RADIOLOGY | Age: 71
DRG: 314 | End: 2021-01-01
Attending: STUDENT IN AN ORGANIZED HEALTH CARE EDUCATION/TRAINING PROGRAM
Payer: MEDICARE

## 2021-01-01 ENCOUNTER — APPOINTMENT (OUTPATIENT)
Dept: ULTRASOUND IMAGING | Age: 71
DRG: 286 | End: 2021-01-01
Attending: NURSE PRACTITIONER
Payer: MEDICARE

## 2021-01-01 ENCOUNTER — HOME CARE VISIT (OUTPATIENT)
Dept: HOSPICE | Facility: HOSPICE | Age: 71
End: 2021-01-01
Payer: MEDICARE

## 2021-01-01 ENCOUNTER — HOSPITAL ENCOUNTER (INPATIENT)
Age: 71
LOS: 20 days | Discharge: HOME | DRG: 286 | End: 2021-05-06
Attending: INTERNAL MEDICINE | Admitting: FAMILY MEDICINE
Payer: MEDICARE

## 2021-01-01 ENCOUNTER — OFFICE VISIT (OUTPATIENT)
Dept: CARDIOLOGY CLINIC | Age: 71
End: 2021-01-01
Payer: MEDICARE

## 2021-01-01 ENCOUNTER — HOSPITAL ENCOUNTER (INPATIENT)
Age: 71
LOS: 17 days | Discharge: HOME | DRG: 314 | End: 2021-05-28
Attending: EMERGENCY MEDICINE | Admitting: INTERNAL MEDICINE
Payer: MEDICARE

## 2021-01-01 ENCOUNTER — APPOINTMENT (OUTPATIENT)
Dept: GENERAL RADIOLOGY | Age: 71
DRG: 286 | End: 2021-01-01
Attending: NURSE PRACTITIONER
Payer: MEDICARE

## 2021-01-01 ENCOUNTER — HOSPICE ADMISSION (OUTPATIENT)
Dept: HOSPICE | Facility: HOSPICE | Age: 71
End: 2021-01-01
Payer: MEDICARE

## 2021-01-01 ENCOUNTER — APPOINTMENT (OUTPATIENT)
Dept: GENERAL RADIOLOGY | Age: 71
DRG: 314 | End: 2021-01-01
Attending: EMERGENCY MEDICINE
Payer: MEDICARE

## 2021-01-01 ENCOUNTER — APPOINTMENT (OUTPATIENT)
Dept: NON INVASIVE DIAGNOSTICS | Age: 71
DRG: 291 | End: 2021-01-01
Attending: NURSE PRACTITIONER
Payer: MEDICARE

## 2021-01-01 ENCOUNTER — HOSPITAL ENCOUNTER (OUTPATIENT)
Dept: INTERVENTIONAL RADIOLOGY/VASCULAR | Age: 71
Discharge: HOME | End: 2021-05-04
Attending: INTERNAL MEDICINE

## 2021-01-01 ENCOUNTER — APPOINTMENT (OUTPATIENT)
Dept: NON INVASIVE DIAGNOSTICS | Age: 71
DRG: 286 | End: 2021-01-01
Attending: NURSE PRACTITIONER
Payer: MEDICARE

## 2021-01-01 ENCOUNTER — APPOINTMENT (OUTPATIENT)
Dept: ULTRASOUND IMAGING | Age: 71
DRG: 291 | End: 2021-01-01
Attending: INTERNAL MEDICINE
Payer: MEDICARE

## 2021-01-01 ENCOUNTER — PATIENT OUTREACH (OUTPATIENT)
Dept: CASE MANAGEMENT | Age: 71
End: 2021-01-01

## 2021-01-01 ENCOUNTER — CLINICAL SUPPORT (OUTPATIENT)
Dept: CARDIOLOGY CLINIC | Age: 71
End: 2021-01-01
Payer: MEDICARE

## 2021-01-01 ENCOUNTER — HOSPICE ADMISSION (OUTPATIENT)
Dept: HOSPICE | Facility: HOSPICE | Age: 71
End: 2021-01-01

## 2021-01-01 ENCOUNTER — APPOINTMENT (OUTPATIENT)
Dept: VASCULAR SURGERY | Age: 71
DRG: 286 | End: 2021-01-01
Attending: INTERNAL MEDICINE
Payer: MEDICARE

## 2021-01-01 ENCOUNTER — APPOINTMENT (OUTPATIENT)
Dept: GENERAL RADIOLOGY | Age: 71
DRG: 314 | End: 2021-01-01
Attending: ANESTHESIOLOGY
Payer: MEDICARE

## 2021-01-01 ENCOUNTER — APPOINTMENT (OUTPATIENT)
Dept: NON INVASIVE DIAGNOSTICS | Age: 71
DRG: 291 | End: 2021-01-01
Attending: HOSPITALIST
Payer: MEDICARE

## 2021-01-01 ENCOUNTER — APPOINTMENT (OUTPATIENT)
Dept: GENERAL RADIOLOGY | Age: 71
DRG: 291 | End: 2021-01-01
Attending: HOSPITALIST
Payer: MEDICARE

## 2021-01-01 ENCOUNTER — APPOINTMENT (OUTPATIENT)
Dept: GENERAL RADIOLOGY | Age: 71
DRG: 286 | End: 2021-01-01
Attending: INTERNAL MEDICINE
Payer: MEDICARE

## 2021-01-01 ENCOUNTER — APPOINTMENT (OUTPATIENT)
Dept: CT IMAGING | Age: 71
DRG: 314 | End: 2021-01-01
Attending: EMERGENCY MEDICINE
Payer: MEDICARE

## 2021-01-01 ENCOUNTER — APPOINTMENT (OUTPATIENT)
Dept: GENERAL RADIOLOGY | Age: 71
DRG: 286 | End: 2021-01-01
Attending: FAMILY MEDICINE
Payer: MEDICARE

## 2021-01-01 ENCOUNTER — HOSPITAL ENCOUNTER (INPATIENT)
Age: 71
LOS: 5 days | DRG: 291 | End: 2021-09-07
Attending: EMERGENCY MEDICINE | Admitting: FAMILY MEDICINE
Payer: MEDICARE

## 2021-01-01 ENCOUNTER — APPOINTMENT (OUTPATIENT)
Dept: INTERVENTIONAL RADIOLOGY/VASCULAR | Age: 71
DRG: 286 | End: 2021-01-01
Attending: INTERNAL MEDICINE
Payer: MEDICARE

## 2021-01-01 ENCOUNTER — APPOINTMENT (OUTPATIENT)
Dept: ULTRASOUND IMAGING | Age: 71
DRG: 286 | End: 2021-01-01
Attending: INTERNAL MEDICINE
Payer: MEDICARE

## 2021-01-01 ENCOUNTER — PATIENT MESSAGE (OUTPATIENT)
Dept: CARDIOLOGY CLINIC | Age: 71
End: 2021-01-01

## 2021-01-01 ENCOUNTER — VIRTUAL VISIT (OUTPATIENT)
Dept: CARDIOLOGY CLINIC | Age: 71
End: 2021-01-01
Payer: OTHER MISCELLANEOUS

## 2021-01-01 ENCOUNTER — APPOINTMENT (OUTPATIENT)
Dept: GENERAL RADIOLOGY | Age: 71
DRG: 291 | End: 2021-01-01
Attending: STUDENT IN AN ORGANIZED HEALTH CARE EDUCATION/TRAINING PROGRAM
Payer: MEDICARE

## 2021-01-01 ENCOUNTER — APPOINTMENT (OUTPATIENT)
Dept: GENERAL RADIOLOGY | Age: 71
DRG: 314 | End: 2021-01-01
Attending: HOSPITALIST
Payer: MEDICARE

## 2021-01-01 ENCOUNTER — APPOINTMENT (OUTPATIENT)
Dept: CT IMAGING | Age: 71
DRG: 314 | End: 2021-01-01
Attending: NURSE PRACTITIONER
Payer: MEDICARE

## 2021-01-01 ENCOUNTER — OFFICE VISIT (OUTPATIENT)
Dept: CARDIOLOGY CLINIC | Age: 71
DRG: 286 | End: 2021-01-01
Payer: MEDICARE

## 2021-01-01 VITALS
OXYGEN SATURATION: 99 % | TEMPERATURE: 98 F | HEIGHT: 62 IN | WEIGHT: 282 LBS | HEART RATE: 91 BPM | RESPIRATION RATE: 20 BRPM | BODY MASS INDEX: 51.89 KG/M2 | SYSTOLIC BLOOD PRESSURE: 98 MMHG

## 2021-01-01 VITALS — RESPIRATION RATE: 20 BRPM

## 2021-01-01 VITALS — WEIGHT: 271 LBS | BODY MASS INDEX: 43.74 KG/M2 | HEART RATE: 72 BPM | OXYGEN SATURATION: 94 %

## 2021-01-01 VITALS
OXYGEN SATURATION: 96 % | BODY MASS INDEX: 44.2 KG/M2 | SYSTOLIC BLOOD PRESSURE: 100 MMHG | RESPIRATION RATE: 18 BRPM | HEIGHT: 66 IN | WEIGHT: 275 LBS | TEMPERATURE: 97.7 F | HEART RATE: 88 BPM

## 2021-01-01 VITALS
RESPIRATION RATE: 18 BRPM | BODY MASS INDEX: 38.72 KG/M2 | OXYGEN SATURATION: 95 % | HEIGHT: 67 IN | SYSTOLIC BLOOD PRESSURE: 80 MMHG | TEMPERATURE: 98.1 F | WEIGHT: 246.69 LBS | HEART RATE: 85 BPM

## 2021-01-01 VITALS
SYSTOLIC BLOOD PRESSURE: 82 MMHG | BODY MASS INDEX: 42.53 KG/M2 | RESPIRATION RATE: 20 BRPM | HEIGHT: 67 IN | TEMPERATURE: 97.7 F | WEIGHT: 271 LBS | OXYGEN SATURATION: 97 % | HEART RATE: 75 BPM

## 2021-01-01 VITALS — OXYGEN SATURATION: 94 % | TEMPERATURE: 98.5 F | RESPIRATION RATE: 14 BRPM | HEART RATE: 71 BPM

## 2021-01-01 VITALS
WEIGHT: 293 LBS | BODY MASS INDEX: 45.99 KG/M2 | RESPIRATION RATE: 31 BRPM | DIASTOLIC BLOOD PRESSURE: 82 MMHG | OXYGEN SATURATION: 86 % | TEMPERATURE: 103.8 F | HEART RATE: 134 BPM | SYSTOLIC BLOOD PRESSURE: 135 MMHG | HEIGHT: 67 IN

## 2021-01-01 VITALS
WEIGHT: 282 LBS | WEIGHT: 234.1 LBS | TEMPERATURE: 98.5 F | RESPIRATION RATE: 20 BRPM | RESPIRATION RATE: 18 BRPM | SYSTOLIC BLOOD PRESSURE: 82 MMHG | SYSTOLIC BLOOD PRESSURE: 78 MMHG | OXYGEN SATURATION: 95 % | HEART RATE: 86 BPM | HEART RATE: 88 BPM | OXYGEN SATURATION: 98 % | TEMPERATURE: 98.7 F | BODY MASS INDEX: 36.74 KG/M2 | BODY MASS INDEX: 51.58 KG/M2 | HEIGHT: 67 IN

## 2021-01-01 VITALS — HEART RATE: 94 BPM | RESPIRATION RATE: 18 BRPM | TEMPERATURE: 98.7 F

## 2021-01-01 VITALS
HEART RATE: 81 BPM | HEART RATE: 56 BPM | RESPIRATION RATE: 14 BRPM | TEMPERATURE: 98.3 F | HEIGHT: 66 IN | OXYGEN SATURATION: 92 % | RESPIRATION RATE: 16 BRPM | SYSTOLIC BLOOD PRESSURE: 88 MMHG | WEIGHT: 275 LBS | OXYGEN SATURATION: 96 % | BODY MASS INDEX: 44.2 KG/M2

## 2021-01-01 VITALS — OXYGEN SATURATION: 93 % | HEART RATE: 95 BPM | RESPIRATION RATE: 20 BRPM | TEMPERATURE: 97.8 F

## 2021-01-01 VITALS — RESPIRATION RATE: 20 BRPM | TEMPERATURE: 97.7 F | HEART RATE: 81 BPM | OXYGEN SATURATION: 88 %

## 2021-01-01 VITALS — HEART RATE: 100 BPM

## 2021-01-01 VITALS — OXYGEN SATURATION: 93 % | HEART RATE: 105 BPM | TEMPERATURE: 98.3 F | RESPIRATION RATE: 20 BRPM

## 2021-01-01 VITALS — TEMPERATURE: 97.8 F | HEART RATE: 41 BPM | OXYGEN SATURATION: 91 % | RESPIRATION RATE: 20 BRPM

## 2021-01-01 VITALS
SYSTOLIC BLOOD PRESSURE: 130 MMHG | HEART RATE: 99 BPM | BODY MASS INDEX: 35.05 KG/M2 | TEMPERATURE: 98.9 F | HEIGHT: 67 IN | DIASTOLIC BLOOD PRESSURE: 93 MMHG | OXYGEN SATURATION: 100 % | RESPIRATION RATE: 28 BRPM | WEIGHT: 223.3 LBS

## 2021-01-01 VITALS — TEMPERATURE: 98 F | HEART RATE: 86 BPM | RESPIRATION RATE: 20 BRPM | OXYGEN SATURATION: 96 %

## 2021-01-01 VITALS — OXYGEN SATURATION: 96 % | RESPIRATION RATE: 18 BRPM | TEMPERATURE: 97.7 F | HEART RATE: 96 BPM

## 2021-01-01 VITALS — OXYGEN SATURATION: 93 % | HEART RATE: 67 BPM | TEMPERATURE: 98.7 F | RESPIRATION RATE: 20 BRPM

## 2021-01-01 VITALS — RESPIRATION RATE: 20 BRPM | OXYGEN SATURATION: 95 % | TEMPERATURE: 98.7 F | HEART RATE: 95 BPM

## 2021-01-01 VITALS — RESPIRATION RATE: 20 BRPM | HEART RATE: 91 BPM | OXYGEN SATURATION: 95 % | TEMPERATURE: 98.3 F

## 2021-01-01 DIAGNOSIS — R32 INCONTINENCE IN FEMALE: ICD-10-CM

## 2021-01-01 DIAGNOSIS — I43 CARDIAC AMYLOIDOSIS (HCC): ICD-10-CM

## 2021-01-01 DIAGNOSIS — Z97.8 ENDOTRACHEALLY INTUBATED: ICD-10-CM

## 2021-01-01 DIAGNOSIS — Z95.811 LVAD (LEFT VENTRICULAR ASSIST DEVICE) PRESENT (HCC): ICD-10-CM

## 2021-01-01 DIAGNOSIS — R63.30 FEEDING DIFFICULTIES: ICD-10-CM

## 2021-01-01 DIAGNOSIS — D61.818 PANCYTOPENIA (HCC): ICD-10-CM

## 2021-01-01 DIAGNOSIS — Z79.01 CHRONIC ANTICOAGULATION: ICD-10-CM

## 2021-01-01 DIAGNOSIS — Z45.02 ICD (IMPLANTABLE CARDIOVERTER-DEFIBRILLATOR) BATTERY DEPLETION: ICD-10-CM

## 2021-01-01 DIAGNOSIS — N39.0 URINARY TRACT INFECTION WITHOUT HEMATURIA, SITE UNSPECIFIED: ICD-10-CM

## 2021-01-01 DIAGNOSIS — R53.81 PHYSICAL DEBILITY: ICD-10-CM

## 2021-01-01 DIAGNOSIS — I25.83 CORONARY ARTERY DISEASE DUE TO LIPID RICH PLAQUE: ICD-10-CM

## 2021-01-01 DIAGNOSIS — R06.02 SHORTNESS OF BREATH: ICD-10-CM

## 2021-01-01 DIAGNOSIS — N17.9 ACUTE RENAL FAILURE, UNSPECIFIED ACUTE RENAL FAILURE TYPE (HCC): ICD-10-CM

## 2021-01-01 DIAGNOSIS — I50.9 CHRONIC CONGESTIVE HEART FAILURE, UNSPECIFIED HEART FAILURE TYPE (HCC): ICD-10-CM

## 2021-01-01 DIAGNOSIS — I49.3 PVC (PREMATURE VENTRICULAR CONTRACTION): ICD-10-CM

## 2021-01-01 DIAGNOSIS — I50.9 CONGESTIVE HEART FAILURE, UNSPECIFIED HF CHRONICITY, UNSPECIFIED HEART FAILURE TYPE (HCC): ICD-10-CM

## 2021-01-01 DIAGNOSIS — Z99.2 ESRD (END STAGE RENAL DISEASE) ON DIALYSIS (HCC): ICD-10-CM

## 2021-01-01 DIAGNOSIS — J96.01 ACUTE RESPIRATORY FAILURE WITH HYPOXIA (HCC): ICD-10-CM

## 2021-01-01 DIAGNOSIS — R56.9 SEIZURE (HCC): ICD-10-CM

## 2021-01-01 DIAGNOSIS — R06.02 SOB (SHORTNESS OF BREATH): ICD-10-CM

## 2021-01-01 DIAGNOSIS — I25.10 CORONARY ARTERY DISEASE DUE TO LIPID RICH PLAQUE: ICD-10-CM

## 2021-01-01 DIAGNOSIS — I50.43 ACUTE ON CHRONIC COMBINED SYSTOLIC AND DIASTOLIC ACC/AHA STAGE C CONGESTIVE HEART FAILURE (HCC): Primary | ICD-10-CM

## 2021-01-01 DIAGNOSIS — I47.29 NSVT (NONSUSTAINED VENTRICULAR TACHYCARDIA): ICD-10-CM

## 2021-01-01 DIAGNOSIS — I89.0 SECONDARY LYMPHEDEMA: ICD-10-CM

## 2021-01-01 DIAGNOSIS — R65.20 SEVERE SEPSIS WITH ACUTE ORGAN DYSFUNCTION (HCC): ICD-10-CM

## 2021-01-01 DIAGNOSIS — Z95.811 LVAD (LEFT VENTRICULAR ASSIST DEVICE) PRESENT (HCC): Primary | ICD-10-CM

## 2021-01-01 DIAGNOSIS — Z79.01 CHRONIC ANTICOAGULATION: Primary | ICD-10-CM

## 2021-01-01 DIAGNOSIS — I50.23 ACUTE ON CHRONIC SYSTOLIC CHF (CONGESTIVE HEART FAILURE) (HCC): ICD-10-CM

## 2021-01-01 DIAGNOSIS — R50.9 FEBRILE ILLNESS, ACUTE: ICD-10-CM

## 2021-01-01 DIAGNOSIS — E66.01 OBESITY, MORBID (HCC): ICD-10-CM

## 2021-01-01 DIAGNOSIS — I50.22 CHRONIC SYSTOLIC CONGESTIVE HEART FAILURE (HCC): Primary | ICD-10-CM

## 2021-01-01 DIAGNOSIS — R06.09 DOE (DYSPNEA ON EXERTION): ICD-10-CM

## 2021-01-01 DIAGNOSIS — I47.20 VT (VENTRICULAR TACHYCARDIA): ICD-10-CM

## 2021-01-01 DIAGNOSIS — E87.79 CARDIAC VOLUME OVERLOAD: ICD-10-CM

## 2021-01-01 DIAGNOSIS — G93.40 ACUTE ENCEPHALOPATHY: ICD-10-CM

## 2021-01-01 DIAGNOSIS — I50.23 ACUTE ON CHRONIC SYSTOLIC (CONGESTIVE) HEART FAILURE (HCC): ICD-10-CM

## 2021-01-01 DIAGNOSIS — Z99.89 OSA ON CPAP: ICD-10-CM

## 2021-01-01 DIAGNOSIS — G93.40 ENCEPHALOPATHY: ICD-10-CM

## 2021-01-01 DIAGNOSIS — I42.8 NICM (NONISCHEMIC CARDIOMYOPATHY) (HCC): ICD-10-CM

## 2021-01-01 DIAGNOSIS — I87.2 CHRONIC VENOUS INSUFFICIENCY: ICD-10-CM

## 2021-01-01 DIAGNOSIS — E85.4 CARDIAC AMYLOIDOSIS (HCC): ICD-10-CM

## 2021-01-01 DIAGNOSIS — G62.9 NEUROPATHY: ICD-10-CM

## 2021-01-01 DIAGNOSIS — Z51.81 ANTICOAGULATION MANAGEMENT ENCOUNTER: ICD-10-CM

## 2021-01-01 DIAGNOSIS — I50.23 ACUTE ON CHRONIC SYSTOLIC CHF (CONGESTIVE HEART FAILURE) (HCC): Primary | ICD-10-CM

## 2021-01-01 DIAGNOSIS — D68.9 COAGULOPATHY (HCC): ICD-10-CM

## 2021-01-01 DIAGNOSIS — G47.33 OSA ON CPAP: ICD-10-CM

## 2021-01-01 DIAGNOSIS — E03.9 ACQUIRED HYPOTHYROIDISM: ICD-10-CM

## 2021-01-01 DIAGNOSIS — N18.6 ESRD (END STAGE RENAL DISEASE) ON DIALYSIS (HCC): ICD-10-CM

## 2021-01-01 DIAGNOSIS — Z71.89 GOALS OF CARE, COUNSELING/DISCUSSION: ICD-10-CM

## 2021-01-01 DIAGNOSIS — J96.01 SEPSIS WITH ACUTE HYPOXIC RESPIRATORY FAILURE WITHOUT SEPTIC SHOCK, DUE TO UNSPECIFIED ORGANISM (HCC): Primary | ICD-10-CM

## 2021-01-01 DIAGNOSIS — R13.10 DYSPHAGIA, UNSPECIFIED TYPE: ICD-10-CM

## 2021-01-01 DIAGNOSIS — N30.00 ACUTE CYSTITIS WITHOUT HEMATURIA: ICD-10-CM

## 2021-01-01 DIAGNOSIS — R41.82 ALTERED MENTAL STATUS, UNSPECIFIED ALTERED MENTAL STATUS TYPE: ICD-10-CM

## 2021-01-01 DIAGNOSIS — Z99.2 ESRD NEEDING DIALYSIS (HCC): ICD-10-CM

## 2021-01-01 DIAGNOSIS — Z95.810 BIVENTRICULAR ICD (IMPLANTABLE CARDIOVERTER-DEFIBRILLATOR) IN PLACE: Primary | ICD-10-CM

## 2021-01-01 DIAGNOSIS — R65.20 SEPSIS WITH ACUTE HYPOXIC RESPIRATORY FAILURE WITHOUT SEPTIC SHOCK, DUE TO UNSPECIFIED ORGANISM (HCC): Primary | ICD-10-CM

## 2021-01-01 DIAGNOSIS — N18.4 ACUTE RENAL FAILURE SUPERIMPOSED ON STAGE 4 CHRONIC KIDNEY DISEASE, UNSPECIFIED ACUTE RENAL FAILURE TYPE (HCC): Primary | ICD-10-CM

## 2021-01-01 DIAGNOSIS — N17.9 ACUTE RENAL FAILURE SUPERIMPOSED ON STAGE 4 CHRONIC KIDNEY DISEASE, UNSPECIFIED ACUTE RENAL FAILURE TYPE (HCC): Primary | ICD-10-CM

## 2021-01-01 DIAGNOSIS — D50.0 IRON DEFICIENCY ANEMIA DUE TO CHRONIC BLOOD LOSS: ICD-10-CM

## 2021-01-01 DIAGNOSIS — A41.9 SEVERE SEPSIS WITH ACUTE ORGAN DYSFUNCTION (HCC): ICD-10-CM

## 2021-01-01 DIAGNOSIS — R06.09 DYSPNEA ON EXERTION: ICD-10-CM

## 2021-01-01 DIAGNOSIS — R14.0 ABDOMINAL DISTENSION: ICD-10-CM

## 2021-01-01 DIAGNOSIS — S81.802D OPEN WOUND OF LEFT LOWER LEG, SUBSEQUENT ENCOUNTER: ICD-10-CM

## 2021-01-01 DIAGNOSIS — Z79.01 ANTICOAGULATION MANAGEMENT ENCOUNTER: ICD-10-CM

## 2021-01-01 DIAGNOSIS — N18.6 ESRD NEEDING DIALYSIS (HCC): ICD-10-CM

## 2021-01-01 DIAGNOSIS — R41.0 DELIRIUM: ICD-10-CM

## 2021-01-01 DIAGNOSIS — A41.9 SEPSIS WITH ACUTE HYPOXIC RESPIRATORY FAILURE WITHOUT SEPTIC SHOCK, DUE TO UNSPECIFIED ORGANISM (HCC): Primary | ICD-10-CM

## 2021-01-01 DIAGNOSIS — I50.22 CHRONIC SYSTOLIC CONGESTIVE HEART FAILURE (HCC): ICD-10-CM

## 2021-01-01 DIAGNOSIS — E88.09 HYPOALBUMINEMIA: ICD-10-CM

## 2021-01-01 DIAGNOSIS — I25.5 ISCHEMIC CARDIOMYOPATHY: ICD-10-CM

## 2021-01-01 DIAGNOSIS — G25.3 MYOCLONUS: ICD-10-CM

## 2021-01-01 LAB
25(OH)D3 SERPL-MCNC: 11 NG/ML (ref 30–100)
ABO + RH BLD: NORMAL
ALBUMIN SERPL-MCNC: 1.5 G/DL (ref 3.5–5)
ALBUMIN SERPL-MCNC: 1.5 G/DL (ref 3.5–5)
ALBUMIN SERPL-MCNC: 1.6 G/DL (ref 3.5–5)
ALBUMIN SERPL-MCNC: 1.6 G/DL (ref 3.5–5)
ALBUMIN SERPL-MCNC: 1.9 G/DL (ref 3.5–5)
ALBUMIN SERPL-MCNC: 2.5 G/DL (ref 3.8–4.8)
ALBUMIN SERPL-MCNC: 2.7 G/DL (ref 3.8–4.8)
ALBUMIN SERPL-MCNC: 2.9 G/DL (ref 3.5–5)
ALBUMIN SERPL-MCNC: 2.9 G/DL (ref 3.5–5)
ALBUMIN SERPL-MCNC: 3 G/DL (ref 3.5–5)
ALBUMIN SERPL-MCNC: 3.1 G/DL (ref 3.5–5)
ALBUMIN SERPL-MCNC: 3.1 G/DL (ref 3.5–5)
ALBUMIN SERPL-MCNC: 3.2 G/DL (ref 3.5–5)
ALBUMIN SERPL-MCNC: 3.3 G/DL (ref 3.5–5)
ALBUMIN SERPL-MCNC: 3.4 G/DL (ref 3.5–5)
ALBUMIN SERPL-MCNC: 3.5 G/DL (ref 3.5–5)
ALBUMIN SERPL-MCNC: 3.6 G/DL (ref 3.5–5)
ALBUMIN SERPL-MCNC: 3.8 G/DL (ref 3.5–5)
ALBUMIN SERPL-MCNC: 3.9 G/DL (ref 3.5–5)
ALBUMIN SERPL-MCNC: 3.9 G/DL (ref 3.5–5)
ALBUMIN SERPL-MCNC: 3.9 G/DL (ref 3.8–4.8)
ALBUMIN SERPL-MCNC: 4 G/DL (ref 3.5–5)
ALBUMIN SERPL-MCNC: 4 G/DL (ref 3.8–4.8)
ALBUMIN SERPL-MCNC: 4.2 G/DL (ref 3.5–5)
ALBUMIN SERPL-MCNC: 4.2 G/DL (ref 3.8–4.8)
ALBUMIN/GLOB SERPL: 0.3 {RATIO} (ref 1.1–2.2)
ALBUMIN/GLOB SERPL: 0.6 {RATIO} (ref 1.2–2.2)
ALBUMIN/GLOB SERPL: 0.6 {RATIO} (ref 1.2–2.2)
ALBUMIN/GLOB SERPL: 0.7 {RATIO} (ref 1.1–2.2)
ALBUMIN/GLOB SERPL: 0.8 {RATIO} (ref 1.1–2.2)
ALBUMIN/GLOB SERPL: 0.9 {RATIO} (ref 1.1–2.2)
ALBUMIN/GLOB SERPL: 0.9 {RATIO} (ref 1.1–2.2)
ALBUMIN/GLOB SERPL: 1 {RATIO} (ref 1.1–2.2)
ALBUMIN/GLOB SERPL: 1 {RATIO} (ref 1.2–2.2)
ALBUMIN/GLOB SERPL: 1.1 {RATIO} (ref 1.2–2.2)
ALBUMIN/GLOB SERPL: 1.1 {RATIO} (ref 1.2–2.2)
ALP SERPL-CCNC: 100 IU/L (ref 48–121)
ALP SERPL-CCNC: 43 U/L (ref 45–117)
ALP SERPL-CCNC: 46 U/L (ref 45–117)
ALP SERPL-CCNC: 46 U/L (ref 45–117)
ALP SERPL-CCNC: 47 U/L (ref 45–117)
ALP SERPL-CCNC: 47 U/L (ref 45–117)
ALP SERPL-CCNC: 48 U/L (ref 45–117)
ALP SERPL-CCNC: 49 U/L (ref 45–117)
ALP SERPL-CCNC: 50 U/L (ref 45–117)
ALP SERPL-CCNC: 50 U/L (ref 45–117)
ALP SERPL-CCNC: 52 U/L (ref 45–117)
ALP SERPL-CCNC: 53 U/L (ref 45–117)
ALP SERPL-CCNC: 54 IU/L (ref 39–117)
ALP SERPL-CCNC: 54 U/L (ref 45–117)
ALP SERPL-CCNC: 55 IU/L (ref 39–117)
ALP SERPL-CCNC: 55 U/L (ref 45–117)
ALP SERPL-CCNC: 56 U/L (ref 45–117)
ALP SERPL-CCNC: 58 U/L (ref 45–117)
ALP SERPL-CCNC: 59 U/L (ref 45–117)
ALP SERPL-CCNC: 59 U/L (ref 45–117)
ALP SERPL-CCNC: 60 U/L (ref 45–117)
ALP SERPL-CCNC: 61 U/L (ref 45–117)
ALP SERPL-CCNC: 66 U/L (ref 45–117)
ALP SERPL-CCNC: 66 U/L (ref 45–117)
ALP SERPL-CCNC: 68 U/L (ref 45–117)
ALP SERPL-CCNC: 71 U/L (ref 45–117)
ALP SERPL-CCNC: 77 IU/L (ref 39–117)
ALP SERPL-CCNC: 80 U/L (ref 45–117)
ALP SERPL-CCNC: 83 U/L (ref 45–117)
ALP SERPL-CCNC: 95 IU/L (ref 48–121)
ALT SERPL-CCNC: 10 U/L (ref 12–78)
ALT SERPL-CCNC: 11 U/L (ref 12–78)
ALT SERPL-CCNC: 12 U/L (ref 12–78)
ALT SERPL-CCNC: 12 U/L (ref 12–78)
ALT SERPL-CCNC: 6 IU/L (ref 0–32)
ALT SERPL-CCNC: 6 U/L (ref 12–78)
ALT SERPL-CCNC: 6 U/L (ref 12–78)
ALT SERPL-CCNC: 7 IU/L (ref 0–32)
ALT SERPL-CCNC: 7 U/L (ref 12–78)
ALT SERPL-CCNC: 8 U/L (ref 12–78)
ALT SERPL-CCNC: 9 U/L (ref 12–78)
ALT SERPL-CCNC: <6 U/L (ref 12–78)
ALT SERPL-CCNC: ABNORMAL IU/L (ref 0–32)
AMMONIA PLAS-SCNC: 17 UMOL/L
AMMONIA PLAS-SCNC: 18 UMOL/L
AMMONIA PLAS-SCNC: 22 UMOL/L
AMMONIA PLAS-SCNC: 23 UMOL/L
AMMONIA PLAS-SCNC: 25 UMOL/L
AMMONIA PLAS-SCNC: 25 UMOL/L
AMMONIA PLAS-SCNC: 26 UMOL/L
AMMONIA PLAS-SCNC: 30 UMOL/L
AMMONIA PLAS-SCNC: 33 UMOL/L
AMMONIA PLAS-SCNC: 38 UMOL/L
AMMONIA PLAS-SCNC: 38 UMOL/L
AMMONIA PLAS-SCNC: 56 UMOL/L
AMMONIA PLAS-SCNC: <10 UMOL/L
ANION GAP SERPL CALC-SCNC: 10 MMOL/L (ref 5–15)
ANION GAP SERPL CALC-SCNC: 11 MMOL/L (ref 5–15)
ANION GAP SERPL CALC-SCNC: 11 MMOL/L (ref 5–15)
ANION GAP SERPL CALC-SCNC: 12 MMOL/L (ref 5–15)
ANION GAP SERPL CALC-SCNC: 13 MMOL/L (ref 5–15)
ANION GAP SERPL CALC-SCNC: 3 MMOL/L (ref 5–15)
ANION GAP SERPL CALC-SCNC: 4 MMOL/L (ref 5–15)
ANION GAP SERPL CALC-SCNC: 5 MMOL/L (ref 5–15)
ANION GAP SERPL CALC-SCNC: 6 MMOL/L (ref 5–15)
ANION GAP SERPL CALC-SCNC: 7 MMOL/L (ref 5–15)
ANION GAP SERPL CALC-SCNC: 8 MMOL/L (ref 5–15)
ANION GAP SERPL CALC-SCNC: 9 MMOL/L (ref 5–15)
ANTI-COMPLEMENT (C3B,C3D): NORMAL
ANTIGENS PRESENT BLD: NORMAL
ANTIGENS PRESENT RBC DONR: NORMAL
APPEARANCE UR: ABNORMAL
APPEARANCE UR: CLEAR
APPEARANCE UR: CLEAR
APTT 1H P INC PPP: 36.2 SEC
APTT IMM NP PPP: 33 SEC
APTT IMM NP PPP: ABNORMAL SEC
APTT IMM NP PPP: NORMAL SEC
APTT PPP: 32.8 SEC (ref 22.1–31)
APTT PPP: 33.3 SEC (ref 22.1–31)
APTT PPP: 36.5 SEC (ref 22.1–31)
APTT PPP: 40.7 SEC (ref 22.1–31)
APTT PPP: 61.1 SEC (ref 22.1–31)
APTT PPP: 74.5 SEC (ref 22.1–31)
APTT PPP: 76.7 SEC (ref 22.1–31)
APTT PPP: NORMAL SEC (ref 26.5–37.3)
ARTERIAL PATENCY WRIST A: ABNORMAL
ARTERIAL PATENCY WRIST A: ABNORMAL
ARTERIAL PATENCY WRIST A: POSITIVE
ARTERIAL PATENCY WRIST A: YES
AST SERPL-CCNC: 12 U/L (ref 15–37)
AST SERPL-CCNC: 12 U/L (ref 15–37)
AST SERPL-CCNC: 13 IU/L (ref 0–40)
AST SERPL-CCNC: 13 U/L (ref 15–37)
AST SERPL-CCNC: 14 IU/L (ref 0–40)
AST SERPL-CCNC: 14 U/L (ref 15–37)
AST SERPL-CCNC: 15 U/L (ref 15–37)
AST SERPL-CCNC: 16 U/L (ref 15–37)
AST SERPL-CCNC: 17 IU/L (ref 0–40)
AST SERPL-CCNC: 17 U/L (ref 15–37)
AST SERPL-CCNC: 18 U/L (ref 15–37)
AST SERPL-CCNC: 19 IU/L (ref 0–40)
AST SERPL-CCNC: 19 U/L (ref 15–37)
AST SERPL-CCNC: 20 U/L (ref 15–37)
AST SERPL-CCNC: 20 U/L (ref 15–37)
AST SERPL-CCNC: 21 U/L (ref 15–37)
AST SERPL-CCNC: 22 IU/L (ref 0–40)
AST SERPL-CCNC: 22 U/L (ref 15–37)
AST SERPL-CCNC: 23 U/L (ref 15–37)
AST SERPL-CCNC: 25 U/L (ref 15–37)
AST SERPL-CCNC: 26 U/L (ref 15–37)
AST SERPL-CCNC: 30 U/L (ref 15–37)
AST SERPL-CCNC: 46 U/L (ref 15–37)
ATRIAL RATE: 102 BPM
ATRIAL RATE: 110 BPM
ATRIAL RATE: 117 BPM
ATRIAL RATE: 170 BPM
ATRIAL RATE: 71 BPM
ATRIAL RATE: 96 BPM
ATRIAL RATE: 98 BPM
AV R PG: 40.24 MMHG
B PERT DNA SPEC QL NAA+PROBE: NOT DETECTED
BACTERIA SPEC CULT: ABNORMAL
BACTERIA SPEC CULT: NORMAL
BACTERIA URNS QL MICRO: ABNORMAL /HPF
BACTERIA URNS QL MICRO: ABNORMAL /HPF
BACTERIA URNS QL MICRO: NEGATIVE /HPF
BASE DEFICIT BLD-SCNC: 0.7 MMOL/L
BASE DEFICIT BLD-SCNC: 0.8 MMOL/L
BASE DEFICIT BLD-SCNC: 1 MMOL/L
BASE DEFICIT BLDA-SCNC: 6 MMOL/L
BASE EXCESS BLD CALC-SCNC: 0 MMOL/L
BASE EXCESS BLD CALC-SCNC: 0 MMOL/L
BASE EXCESS BLD CALC-SCNC: 0.5 MMOL/L
BASE EXCESS BLD CALC-SCNC: 2 MMOL/L
BASE EXCESS BLD CALC-SCNC: 2 MMOL/L
BASE EXCESS BLD CALC-SCNC: 3 MMOL/L
BASE EXCESS BLD CALC-SCNC: 7 MMOL/L
BASE EXCESS BLD CALC-SCNC: 8 MMOL/L
BASE EXCESS BLD CALC-SCNC: 8 MMOL/L
BASOPHILS # BLD AUTO: 0 X10E3/UL (ref 0–0.2)
BASOPHILS # BLD: 0 K/UL (ref 0–0.1)
BASOPHILS # BLD: 0.1 K/UL (ref 0–0.1)
BASOPHILS # BLD: 0.1 K/UL (ref 0–0.1)
BASOPHILS NFR BLD AUTO: 1 %
BASOPHILS NFR BLD: 0 % (ref 0–1)
BASOPHILS NFR BLD: 1 % (ref 0–1)
BDY SITE: ABNORMAL
BILIRUB SERPL-MCNC: 0.3 MG/DL (ref 0–1.2)
BILIRUB SERPL-MCNC: 0.4 MG/DL (ref 0.2–1)
BILIRUB SERPL-MCNC: 0.4 MG/DL (ref 0.2–1)
BILIRUB SERPL-MCNC: 0.4 MG/DL (ref 0–1.2)
BILIRUB SERPL-MCNC: 0.4 MG/DL (ref 0–1.2)
BILIRUB SERPL-MCNC: 0.5 MG/DL (ref 0.2–1)
BILIRUB SERPL-MCNC: 0.5 MG/DL (ref 0.2–1)
BILIRUB SERPL-MCNC: 0.6 MG/DL (ref 0.2–1)
BILIRUB SERPL-MCNC: 0.6 MG/DL (ref 0–1.2)
BILIRUB SERPL-MCNC: 0.7 MG/DL (ref 0.2–1)
BILIRUB SERPL-MCNC: 0.7 MG/DL (ref 0–1.2)
BILIRUB SERPL-MCNC: 0.8 MG/DL (ref 0.2–1)
BILIRUB SERPL-MCNC: 0.9 MG/DL (ref 0.2–1)
BILIRUB UR QL CFM: NEGATIVE
BILIRUB UR QL CFM: NEGATIVE
BILIRUB UR QL: NEGATIVE
BLD GP AB SCN SERPL QL ELUTION: NORMAL
BLD PROD TYP BPU: NORMAL
BLOOD BANK CMNT PATIENT-IMP: NORMAL
BLOOD GROUP ANTIBODIES SERPL: NORMAL
BNP SERPL-MCNC: 7545 PG/ML
BNP SERPL-MCNC: 7688 PG/ML
BNP SERPL-MCNC: 8182 PG/ML
BNP SERPL-MCNC: 8751 PG/ML
BNP SERPL-MCNC: 9005 PG/ML
BNP SERPL-MCNC: 9282 PG/ML
BNP SERPL-MCNC: 9464 PG/ML
BNP SERPL-MCNC: 9473 PG/ML
BNP SERPL-MCNC: 9994 PG/ML
BNP SERPL-MCNC: 9994 PG/ML
BNP SERPL-MCNC: ABNORMAL PG/ML
BODY TEMPERATURE: 37
BORDETELLA PARAPERTUSSIS PCR, BORPAR: NOT DETECTED
BPU ID: NORMAL
BUN SERPL-MCNC: 10 MG/DL (ref 6–20)
BUN SERPL-MCNC: 103 MG/DL (ref 6–20)
BUN SERPL-MCNC: 14 MG/DL (ref 6–20)
BUN SERPL-MCNC: 16 MG/DL (ref 6–20)
BUN SERPL-MCNC: 20 MG/DL (ref 6–20)
BUN SERPL-MCNC: 20 MG/DL (ref 6–20)
BUN SERPL-MCNC: 22 MG/DL (ref 6–20)
BUN SERPL-MCNC: 22 MG/DL (ref 6–20)
BUN SERPL-MCNC: 23 MG/DL (ref 6–20)
BUN SERPL-MCNC: 23 MG/DL (ref 6–20)
BUN SERPL-MCNC: 25 MG/DL (ref 6–20)
BUN SERPL-MCNC: 27 MG/DL (ref 6–20)
BUN SERPL-MCNC: 28 MG/DL (ref 6–20)
BUN SERPL-MCNC: 28 MG/DL (ref 6–20)
BUN SERPL-MCNC: 31 MG/DL (ref 6–20)
BUN SERPL-MCNC: 31 MG/DL (ref 6–20)
BUN SERPL-MCNC: 31 MG/DL (ref 8–27)
BUN SERPL-MCNC: 33 MG/DL (ref 6–20)
BUN SERPL-MCNC: 33 MG/DL (ref 6–20)
BUN SERPL-MCNC: 34 MG/DL (ref 6–20)
BUN SERPL-MCNC: 35 MG/DL (ref 6–20)
BUN SERPL-MCNC: 35 MG/DL (ref 8–27)
BUN SERPL-MCNC: 39 MG/DL (ref 6–20)
BUN SERPL-MCNC: 41 MG/DL (ref 6–20)
BUN SERPL-MCNC: 42 MG/DL (ref 6–20)
BUN SERPL-MCNC: 43 MG/DL (ref 6–20)
BUN SERPL-MCNC: 43 MG/DL (ref 8–27)
BUN SERPL-MCNC: 44 MG/DL (ref 6–20)
BUN SERPL-MCNC: 45 MG/DL (ref 6–20)
BUN SERPL-MCNC: 45 MG/DL (ref 6–20)
BUN SERPL-MCNC: 47 MG/DL (ref 6–20)
BUN SERPL-MCNC: 49 MG/DL (ref 6–20)
BUN SERPL-MCNC: 49 MG/DL (ref 6–20)
BUN SERPL-MCNC: 50 MG/DL (ref 6–20)
BUN SERPL-MCNC: 50 MG/DL (ref 6–20)
BUN SERPL-MCNC: 51 MG/DL (ref 6–20)
BUN SERPL-MCNC: 52 MG/DL (ref 6–20)
BUN SERPL-MCNC: 55 MG/DL (ref 6–20)
BUN SERPL-MCNC: 56 MG/DL (ref 6–20)
BUN SERPL-MCNC: 56 MG/DL (ref 6–20)
BUN SERPL-MCNC: 57 MG/DL (ref 6–20)
BUN SERPL-MCNC: 60 MG/DL (ref 6–20)
BUN SERPL-MCNC: 63 MG/DL (ref 6–20)
BUN SERPL-MCNC: 66 MG/DL (ref 6–20)
BUN SERPL-MCNC: 66 MG/DL (ref 6–20)
BUN SERPL-MCNC: 66 MG/DL (ref 8–27)
BUN SERPL-MCNC: 69 MG/DL (ref 8–27)
BUN SERPL-MCNC: 75 MG/DL (ref 6–20)
BUN SERPL-MCNC: 77 MG/DL (ref 6–20)
BUN SERPL-MCNC: 97 MG/DL (ref 6–20)
BUN SERPL-MCNC: 98 MG/DL (ref 6–20)
BUN SERPL-MCNC: 99 MG/DL (ref 6–20)
BUN/CREAT SERPL: 10 (ref 12–20)
BUN/CREAT SERPL: 11 (ref 12–20)
BUN/CREAT SERPL: 12 (ref 12–20)
BUN/CREAT SERPL: 13 (ref 12–20)
BUN/CREAT SERPL: 13 (ref 12–20)
BUN/CREAT SERPL: 14 (ref 12–20)
BUN/CREAT SERPL: 14 (ref 12–28)
BUN/CREAT SERPL: 15 (ref 12–20)
BUN/CREAT SERPL: 15 (ref 12–28)
BUN/CREAT SERPL: 15 (ref 12–28)
BUN/CREAT SERPL: 16 (ref 12–20)
BUN/CREAT SERPL: 17 (ref 12–20)
BUN/CREAT SERPL: 17 (ref 12–20)
BUN/CREAT SERPL: 18 (ref 12–20)
BUN/CREAT SERPL: 18 (ref 12–20)
BUN/CREAT SERPL: 19 (ref 12–20)
BUN/CREAT SERPL: 32 (ref 12–28)
BUN/CREAT SERPL: 33 (ref 12–28)
BUN/CREAT SERPL: 4 (ref 12–20)
BUN/CREAT SERPL: 4 (ref 12–20)
BUN/CREAT SERPL: 5 (ref 12–20)
BUN/CREAT SERPL: 6 (ref 12–20)
BUN/CREAT SERPL: 7 (ref 12–20)
BUN/CREAT SERPL: 8 (ref 12–20)
BUN/CREAT SERPL: 9 (ref 12–20)
C PNEUM DNA SPEC QL NAA+PROBE: NOT DETECTED
CA-I BLD-SCNC: 1.06 MMOL/L (ref 1.12–1.32)
CA-I BLD-SCNC: 1.13 MMOL/L (ref 1.12–1.32)
CA-I BLD-SCNC: 1.14 MMOL/L (ref 1.12–1.32)
CA-I BLD-SCNC: 1.16 MMOL/L (ref 1.12–1.32)
CA-I BLD-SCNC: 1.16 MMOL/L (ref 1.12–1.32)
CA-I BLD-SCNC: 1.17 MMOL/L (ref 1.12–1.32)
CA-I BLD-SCNC: 1.18 MMOL/L (ref 1.12–1.32)
CA-I BLD-SCNC: 1.19 MMOL/L (ref 1.13–1.32)
CA-I BLD-SCNC: 1.22 MMOL/L (ref 1.12–1.32)
CA-I BLD-SCNC: 1.22 MMOL/L (ref 1.12–1.32)
CA-I BLD-SCNC: 1.23 MMOL/L (ref 1.12–1.32)
CALCIUM SERPL-MCNC: 10 MG/DL (ref 8.5–10.1)
CALCIUM SERPL-MCNC: 6.4 MG/DL (ref 8.5–10.1)
CALCIUM SERPL-MCNC: 6.7 MG/DL (ref 8.5–10.1)
CALCIUM SERPL-MCNC: 6.8 MG/DL (ref 8.5–10.1)
CALCIUM SERPL-MCNC: 7.2 MG/DL (ref 8.5–10.1)
CALCIUM SERPL-MCNC: 7.3 MG/DL (ref 8.5–10.1)
CALCIUM SERPL-MCNC: 7.5 MG/DL (ref 8.5–10.1)
CALCIUM SERPL-MCNC: 7.8 MG/DL (ref 8.7–10.3)
CALCIUM SERPL-MCNC: 8.2 MG/DL (ref 8.5–10.1)
CALCIUM SERPL-MCNC: 8.2 MG/DL (ref 8.7–10.3)
CALCIUM SERPL-MCNC: 8.4 MG/DL (ref 8.5–10.1)
CALCIUM SERPL-MCNC: 8.6 MG/DL (ref 8.5–10.1)
CALCIUM SERPL-MCNC: 8.7 MG/DL (ref 8.5–10.1)
CALCIUM SERPL-MCNC: 8.8 MG/DL (ref 8.5–10.1)
CALCIUM SERPL-MCNC: 8.9 MG/DL (ref 8.5–10.1)
CALCIUM SERPL-MCNC: 8.9 MG/DL (ref 8.7–10.3)
CALCIUM SERPL-MCNC: 9 MG/DL (ref 8.5–10.1)
CALCIUM SERPL-MCNC: 9 MG/DL (ref 8.7–10.3)
CALCIUM SERPL-MCNC: 9.1 MG/DL (ref 8.5–10.1)
CALCIUM SERPL-MCNC: 9.2 MG/DL (ref 8.5–10.1)
CALCIUM SERPL-MCNC: 9.3 MG/DL (ref 8.5–10.1)
CALCIUM SERPL-MCNC: 9.4 MG/DL (ref 8.5–10.1)
CALCIUM SERPL-MCNC: 9.5 MG/DL (ref 8.7–10.3)
CALCULATED P AXIS, ECG09: 113 DEGREES
CALCULATED R AXIS, ECG10: -69 DEGREES
CALCULATED R AXIS, ECG10: -71 DEGREES
CALCULATED R AXIS, ECG10: -71 DEGREES
CALCULATED R AXIS, ECG10: -73 DEGREES
CALCULATED R AXIS, ECG10: -73 DEGREES
CALCULATED R AXIS, ECG10: -78 DEGREES
CALCULATED R AXIS, ECG10: 180 DEGREES
CALCULATED T AXIS, ECG11: -63 DEGREES
CALCULATED T AXIS, ECG11: 100 DEGREES
CALCULATED T AXIS, ECG11: 106 DEGREES
CALCULATED T AXIS, ECG11: 86 DEGREES
CALCULATED T AXIS, ECG11: 88 DEGREES
CALCULATED T AXIS, ECG11: 91 DEGREES
CALCULATED T AXIS, ECG11: 99 DEGREES
CC UR VC: ABNORMAL
CC UR VC: ABNORMAL
CHLORIDE SERPL-SCNC: 100 MMOL/L (ref 97–108)
CHLORIDE SERPL-SCNC: 101 MMOL/L (ref 97–108)
CHLORIDE SERPL-SCNC: 102 MMOL/L (ref 97–108)
CHLORIDE SERPL-SCNC: 103 MMOL/L (ref 96–106)
CHLORIDE SERPL-SCNC: 103 MMOL/L (ref 97–108)
CHLORIDE SERPL-SCNC: 104 MMOL/L (ref 97–108)
CHLORIDE SERPL-SCNC: 105 MMOL/L (ref 97–108)
CHLORIDE SERPL-SCNC: 105 MMOL/L (ref 97–108)
CHLORIDE SERPL-SCNC: 106 MMOL/L (ref 97–108)
CHLORIDE SERPL-SCNC: 107 MMOL/L (ref 97–108)
CHLORIDE SERPL-SCNC: 109 MMOL/L (ref 97–108)
CHLORIDE SERPL-SCNC: 94 MMOL/L (ref 96–106)
CHLORIDE SERPL-SCNC: 96 MMOL/L (ref 96–106)
CHLORIDE SERPL-SCNC: 97 MMOL/L (ref 97–108)
CHLORIDE SERPL-SCNC: 98 MMOL/L (ref 96–106)
CHLORIDE SERPL-SCNC: 98 MMOL/L (ref 97–108)
CHLORIDE SERPL-SCNC: 98 MMOL/L (ref 97–108)
CHLORIDE SERPL-SCNC: 99 MMOL/L (ref 96–106)
CHLORIDE SERPL-SCNC: 99 MMOL/L (ref 97–108)
CHLORIDE UR-SCNC: 44 MMOL/L
CHOLEST SERPL-MCNC: 129 MG/DL
CK SERPL-CCNC: 15 U/L (ref 26–192)
CK SERPL-CCNC: 20 U/L (ref 26–192)
CK SERPL-CCNC: 25 U/L (ref 26–192)
CK SERPL-CCNC: 27 U/L (ref 26–192)
CK SERPL-CCNC: 61 U/L (ref 26–192)
CK SERPL-CCNC: 72 U/L (ref 26–192)
CO2 SERPL-SCNC: 20 MMOL/L (ref 21–32)
CO2 SERPL-SCNC: 21 MMOL/L (ref 21–32)
CO2 SERPL-SCNC: 22 MMOL/L (ref 20–29)
CO2 SERPL-SCNC: 22 MMOL/L (ref 21–32)
CO2 SERPL-SCNC: 22 MMOL/L (ref 21–32)
CO2 SERPL-SCNC: 23 MMOL/L (ref 21–32)
CO2 SERPL-SCNC: 24 MMOL/L (ref 21–32)
CO2 SERPL-SCNC: 25 MMOL/L (ref 21–32)
CO2 SERPL-SCNC: 26 MMOL/L (ref 21–32)
CO2 SERPL-SCNC: 27 MMOL/L (ref 20–29)
CO2 SERPL-SCNC: 27 MMOL/L (ref 21–32)
CO2 SERPL-SCNC: 28 MMOL/L (ref 20–29)
CO2 SERPL-SCNC: 28 MMOL/L (ref 21–32)
CO2 SERPL-SCNC: 29 MMOL/L (ref 20–29)
CO2 SERPL-SCNC: 29 MMOL/L (ref 20–29)
CO2 SERPL-SCNC: 29 MMOL/L (ref 21–32)
CO2 SERPL-SCNC: 30 MMOL/L (ref 21–32)
CO2 SERPL-SCNC: 31 MMOL/L (ref 21–32)
CO2 SERPL-SCNC: 32 MMOL/L (ref 21–32)
CO2 SERPL-SCNC: 33 MMOL/L (ref 21–32)
CO2 SERPL-SCNC: 33 MMOL/L (ref 21–32)
CO2 SERPL-SCNC: 34 MMOL/L (ref 21–32)
CO2 SERPL-SCNC: 34 MMOL/L (ref 21–32)
CO2 SERPL-SCNC: 35 MMOL/L (ref 21–32)
CO2 SERPL-SCNC: 35 MMOL/L (ref 21–32)
COLOR UR: ABNORMAL
COMMENT, HOLDF: NORMAL
CORTIS SERPL-MCNC: 12.3 UG/DL
COVID-19 RAPID TEST, COVR: NOT DETECTED
COVID-19 RAPID TEST, COVR: NOT DETECTED
CREAT SERPL-MCNC: 1.98 MG/DL (ref 0.57–1)
CREAT SERPL-MCNC: 2.04 MG/DL (ref 0.55–1.02)
CREAT SERPL-MCNC: 2.1 MG/DL (ref 0.57–1)
CREAT SERPL-MCNC: 2.18 MG/DL (ref 0.57–1)
CREAT SERPL-MCNC: 2.25 MG/DL (ref 0.55–1.02)
CREAT SERPL-MCNC: 2.27 MG/DL (ref 0.55–1.02)
CREAT SERPL-MCNC: 2.44 MG/DL (ref 0.57–1)
CREAT SERPL-MCNC: 2.5 MG/DL (ref 0.55–1.02)
CREAT SERPL-MCNC: 2.52 MG/DL (ref 0.55–1.02)
CREAT SERPL-MCNC: 2.64 MG/DL (ref 0.55–1.02)
CREAT SERPL-MCNC: 2.76 MG/DL (ref 0.55–1.02)
CREAT SERPL-MCNC: 2.77 MG/DL (ref 0.55–1.02)
CREAT SERPL-MCNC: 2.87 MG/DL (ref 0.55–1.02)
CREAT SERPL-MCNC: 2.88 MG/DL (ref 0.55–1.02)
CREAT SERPL-MCNC: 2.89 MG/DL (ref 0.57–1)
CREAT SERPL-MCNC: 2.9 MG/DL (ref 0.55–1.02)
CREAT SERPL-MCNC: 2.94 MG/DL (ref 0.55–1.02)
CREAT SERPL-MCNC: 3.02 MG/DL (ref 0.55–1.02)
CREAT SERPL-MCNC: 3.03 MG/DL (ref 0.55–1.02)
CREAT SERPL-MCNC: 3.13 MG/DL (ref 0.55–1.02)
CREAT SERPL-MCNC: 3.14 MG/DL (ref 0.55–1.02)
CREAT SERPL-MCNC: 3.16 MG/DL (ref 0.55–1.02)
CREAT SERPL-MCNC: 3.16 MG/DL (ref 0.55–1.02)
CREAT SERPL-MCNC: 3.22 MG/DL (ref 0.55–1.02)
CREAT SERPL-MCNC: 3.28 MG/DL (ref 0.55–1.02)
CREAT SERPL-MCNC: 3.38 MG/DL (ref 0.55–1.02)
CREAT SERPL-MCNC: 3.45 MG/DL (ref 0.55–1.02)
CREAT SERPL-MCNC: 3.47 MG/DL (ref 0.55–1.02)
CREAT SERPL-MCNC: 3.48 MG/DL (ref 0.55–1.02)
CREAT SERPL-MCNC: 3.51 MG/DL (ref 0.55–1.02)
CREAT SERPL-MCNC: 3.63 MG/DL (ref 0.55–1.02)
CREAT SERPL-MCNC: 3.63 MG/DL (ref 0.55–1.02)
CREAT SERPL-MCNC: 3.67 MG/DL (ref 0.55–1.02)
CREAT SERPL-MCNC: 3.67 MG/DL (ref 0.55–1.02)
CREAT SERPL-MCNC: 3.68 MG/DL (ref 0.55–1.02)
CREAT SERPL-MCNC: 3.76 MG/DL (ref 0.55–1.02)
CREAT SERPL-MCNC: 3.79 MG/DL (ref 0.55–1.02)
CREAT SERPL-MCNC: 3.86 MG/DL (ref 0.55–1.02)
CREAT SERPL-MCNC: 3.91 MG/DL (ref 0.55–1.02)
CREAT SERPL-MCNC: 4.02 MG/DL (ref 0.55–1.02)
CREAT SERPL-MCNC: 4.04 MG/DL (ref 0.55–1.02)
CREAT SERPL-MCNC: 4.11 MG/DL (ref 0.55–1.02)
CREAT SERPL-MCNC: 4.15 MG/DL (ref 0.55–1.02)
CREAT SERPL-MCNC: 4.15 MG/DL (ref 0.55–1.02)
CREAT SERPL-MCNC: 4.29 MG/DL (ref 0.55–1.02)
CREAT SERPL-MCNC: 4.77 MG/DL (ref 0.55–1.02)
CREAT SERPL-MCNC: 4.78 MG/DL (ref 0.55–1.02)
CREAT SERPL-MCNC: 4.79 MG/DL (ref 0.55–1.02)
CREAT SERPL-MCNC: 4.88 MG/DL (ref 0.55–1.02)
CREAT SERPL-MCNC: 4.97 MG/DL (ref 0.55–1.02)
CREAT SERPL-MCNC: 4.98 MG/DL (ref 0.55–1.02)
CREAT SERPL-MCNC: 5.11 MG/DL (ref 0.55–1.02)
CREAT SERPL-MCNC: 5.74 MG/DL (ref 0.55–1.02)
CREAT SERPL-MCNC: 6.5 MG/DL (ref 0.55–1.02)
CREAT SERPL-MCNC: 6.51 MG/DL (ref 0.55–1.02)
CREAT SERPL-MCNC: 7.12 MG/DL (ref 0.55–1.02)
CREAT SERPL-MCNC: 7.38 MG/DL (ref 0.55–1.02)
CREAT SERPL-MCNC: 7.91 MG/DL (ref 0.55–1.02)
CREAT SERPL-MCNC: 8 MG/DL (ref 0.55–1.02)
CREAT UR-MCNC: 90.1 MG/DL
CROSSMATCH RESULT,%XM: NORMAL
DAT IGG-SP REAG RBC QL: NORMAL
DAT POLY-SP REAG RBC QL: NORMAL
DIAGNOSIS, 93000: NORMAL
DIFFERENTIAL METHOD BLD: ABNORMAL
ECHO AO ROOT DIAM: 2.94 CM
ECHO AO ROOT DIAM: 2.97 CM
ECHO AO ROOT DIAM: 3.24 CM
ECHO AO ROOT DIAM: 3.63 CM
ECHO AO ROOT DIAM: 3.65 CM
ECHO AR MAX VEL PISA: 317.18 CM/S
ECHO AV PEAK GRADIENT: 1.24 MMHG
ECHO AV PEAK VELOCITY: 55.78 CM/S
ECHO AV REGURGITANT PHT: 1917.15 MS
ECHO EST RA PRESSURE: 5 MMHG
ECHO EST RA PRESSURE: 8 MMHG
ECHO EST RA PRESSURE: 8 MMHG
ECHO LA AREA 4C: 27.11 CM2
ECHO LA AREA 4C: 28.81 CM2
ECHO LA AREA 4C: 31.9 CM2
ECHO LA MAJOR AXIS: 3.22 CM
ECHO LA MAJOR AXIS: 4.27 CM
ECHO LA MAJOR AXIS: 4.46 CM
ECHO LA MAJOR AXIS: 5.73 CM
ECHO LA MINOR AXIS: 1.4 CM
ECHO LA MINOR AXIS: 1.91 CM
ECHO LA MINOR AXIS: 1.92 CM
ECHO LA MINOR AXIS: 2.49 CM
ECHO LA VOL 2C: 100.55 ML (ref 22–52)
ECHO LA VOL 2C: 114.55 ML (ref 22–52)
ECHO LA VOL 2C: 84.18 ML (ref 22–52)
ECHO LA VOL 4C: 109.46 ML (ref 22–52)
ECHO LA VOL 4C: 90.67 ML (ref 22–52)
ECHO LA VOL 4C: 91.65 ML (ref 22–52)
ECHO LA VOL BP: 101.48 ML (ref 22–52)
ECHO LA VOL BP: 115 ML (ref 22–52)
ECHO LA VOL BP: 115.48 ML (ref 22–52)
ECHO LA VOL/BSA BIPLANE: 44.89 ML/M2 (ref 16–28)
ECHO LA VOL/BSA BIPLANE: 50.27 ML/M2 (ref 16–28)
ECHO LA VOL/BSA BIPLANE: 51.57 ML/M2 (ref 16–28)
ECHO LA VOLUME INDEX A2C: 36.65 ML/M2 (ref 16–28)
ECHO LA VOLUME INDEX A2C: 44.48 ML/M2 (ref 16–28)
ECHO LA VOLUME INDEX A2C: 51.37 ML/M2 (ref 16–28)
ECHO LA VOLUME INDEX A4C: 40.11 ML/M2 (ref 16–28)
ECHO LA VOLUME INDEX A4C: 41.1 ML/M2 (ref 16–28)
ECHO LA VOLUME INDEX A4C: 47.65 ML/M2 (ref 16–28)
ECHO LV E' LATERAL VELOCITY: 5.12 CM/S
ECHO LV E' SEPTAL VELOCITY: 3.67 CM/S
ECHO LV EDV A2C: 216.01 ML
ECHO LV EDV A4C: 158.24 ML
ECHO LV EDV BP: 193.47 ML (ref 56–104)
ECHO LV EDV INDEX A4C: 68.9 ML/M2
ECHO LV EDV INDEX BP: 84.2 ML/M2
ECHO LV EDV NDEX A2C: 94 ML/M2
ECHO LV EJECTION FRACTION A2C: 19 PERCENT
ECHO LV EJECTION FRACTION A4C: 19 PERCENT
ECHO LV EJECTION FRACTION BIPLANE: 21.4 PERCENT (ref 55–100)
ECHO LV ESV A2C: 175.55 ML
ECHO LV ESV A4C: 128.13 ML
ECHO LV ESV BP: 152.13 ML (ref 19–49)
ECHO LV ESV INDEX A2C: 76.4 ML/M2
ECHO LV ESV INDEX A4C: 55.8 ML/M2
ECHO LV ESV INDEX BP: 66.2 ML/M2
ECHO LV INTERNAL DIMENSION DIASTOLIC: 5.96 CM (ref 3.9–5.3)
ECHO LV INTERNAL DIMENSION DIASTOLIC: 6.68 CM (ref 3.9–5.3)
ECHO LV INTERNAL DIMENSION DIASTOLIC: 6.76 CM (ref 3.9–5.3)
ECHO LV INTERNAL DIMENSION DIASTOLIC: 6.82 CM (ref 3.9–5.3)
ECHO LV INTERNAL DIMENSION DIASTOLIC: 7.37 CM (ref 3.9–5.3)
ECHO LV INTERNAL DIMENSION DIASTOLIC: 8.24 CM (ref 3.9–5.3)
ECHO LV INTERNAL DIMENSION SYSTOLIC: 5.49 CM
ECHO LV INTERNAL DIMENSION SYSTOLIC: 5.72 CM
ECHO LV INTERNAL DIMENSION SYSTOLIC: 6.06 CM
ECHO LV INTERNAL DIMENSION SYSTOLIC: 6.49 CM
ECHO LV INTERNAL DIMENSION SYSTOLIC: 6.88 CM
ECHO LV INTERNAL DIMENSION SYSTOLIC: 6.91 CM
ECHO LV IVSD: 0.7 CM (ref 0.6–0.9)
ECHO LV IVSD: 0.79 CM (ref 0.6–0.9)
ECHO LV IVSD: 0.82 CM (ref 0.6–0.9)
ECHO LV IVSD: 0.98 CM (ref 0.6–0.9)
ECHO LV IVSD: 1.09 CM (ref 0.6–0.9)
ECHO LV IVSD: 1.09 CM (ref 0.6–0.9)
ECHO LV MASS 2D: 252 G (ref 67–162)
ECHO LV MASS 2D: 267.3 G (ref 67–162)
ECHO LV MASS 2D: 284.6 G (ref 67–162)
ECHO LV MASS 2D: 298.7 G (ref 67–162)
ECHO LV MASS 2D: 318.2 G (ref 67–162)
ECHO LV MASS 2D: 321.5 G (ref 67–162)
ECHO LV MASS INDEX 2D: 109.9 G/M2 (ref 43–95)
ECHO LV MASS INDEX 2D: 117.1 G/M2 (ref 43–95)
ECHO LV MASS INDEX 2D: 119.8 G/M2 (ref 43–95)
ECHO LV MASS INDEX 2D: 132.1 G/M2 (ref 43–95)
ECHO LV MASS INDEX 2D: 138.5 G/M2 (ref 43–95)
ECHO LV MASS INDEX 2D: 138.8 G/M2 (ref 43–95)
ECHO LV POSTERIOR WALL DIASTOLIC: 0.79 CM (ref 0.6–0.9)
ECHO LV POSTERIOR WALL DIASTOLIC: 0.86 CM (ref 0.6–0.9)
ECHO LV POSTERIOR WALL DIASTOLIC: 0.93 CM (ref 0.6–0.9)
ECHO LV POSTERIOR WALL DIASTOLIC: 1.02 CM (ref 0.6–0.9)
ECHO LV POSTERIOR WALL DIASTOLIC: 1.04 CM (ref 0.6–0.9)
ECHO LV POSTERIOR WALL DIASTOLIC: 1.07 CM (ref 0.6–0.9)
ECHO LVOT DIAM: 2.28 CM
ECHO LVOT DIAM: 2.3 CM
ECHO MV A VELOCITY: 48.97 CM/S
ECHO MV A VELOCITY: 84.87 CM/S
ECHO MV AREA PHT: 3 CM2
ECHO MV AREA PHT: 3.83 CM2
ECHO MV E DECELERATION TIME (DT): 197.98 MS
ECHO MV E DECELERATION TIME (DT): 252.57 MS
ECHO MV E VELOCITY: 83.13 CM/S
ECHO MV E VELOCITY: 83.96 CM/S
ECHO MV E/A RATIO: 0.99
ECHO MV E/A RATIO: 1.7
ECHO MV E/E' LATERAL: 16.24
ECHO MV E/E' RATIO (AVERAGED): 19.44
ECHO MV E/E' SEPTAL: 22.65
ECHO MV PRESSURE HALF TIME (PHT): 57.41 MS
ECHO MV PRESSURE HALF TIME (PHT): 73.24 MS
ECHO PV MAX VELOCITY: 108.51 CM/S
ECHO PV MAX VELOCITY: 113.94 CM/S
ECHO PV PEAK INSTANTANEOUS GRADIENT SYSTOLIC: 2.2 MMHG
ECHO PV PEAK INSTANTANEOUS GRADIENT SYSTOLIC: 4.71 MMHG
ECHO PV PEAK INSTANTANEOUS GRADIENT SYSTOLIC: 4.75 MMHG
ECHO PV PEAK INSTANTANEOUS GRADIENT SYSTOLIC: 5.19 MMHG
ECHO PV REGURGITANT MAX VELOCITY: 129.88 CM/S
ECHO RIGHT VENTRICULAR SYSTOLIC PRESSURE (RVSP): 39.31 MMHG
ECHO RIGHT VENTRICULAR SYSTOLIC PRESSURE (RVSP): 39.72 MMHG
ECHO RIGHT VENTRICULAR SYSTOLIC PRESSURE (RVSP): 40.62 MMHG
ECHO RV INTERNAL DIMENSION: 4.14 CM
ECHO RV INTERNAL DIMENSION: 5.14 CM
ECHO RV INTERNAL DIMENSION: 5.27 CM
ECHO RV INTERNAL DIMENSION: 5.73 CM
ECHO RV INTERNAL DIMENSION: 6.66 CM
ECHO RV TAPSE: 0.88 CM (ref 1.5–2)
ECHO RV TAPSE: 0.99 CM (ref 1.5–2)
ECHO RV TAPSE: 1.1 CM (ref 1.5–2)
ECHO RV TAPSE: 1.44 CM (ref 1.5–2)
ECHO RV TAPSE: 1.91 CM (ref 1.5–2)
ECHO RV TAPSE: 2.51 CM (ref 1.5–2)
ECHO TV REGURGITANT MAX VELOCITY: 262.49 CM/S
ECHO TV REGURGITANT MAX VELOCITY: 274.87 CM/S
ECHO TV REGURGITANT MAX VELOCITY: 279.78 CM/S
ECHO TV REGURGITANT MAX VELOCITY: 285.57 CM/S
ECHO TV REGURGITANT MAX VELOCITY: 294.62 CM/S
ECHO TV REGURGITANT MAX VELOCITY: 310.45 CM/S
ECHO TV REGURGITANT PEAK GRADIENT: 27.56 MMHG
ECHO TV REGURGITANT PEAK GRADIENT: 30.22 MMHG
ECHO TV REGURGITANT PEAK GRADIENT: 31.31 MMHG
ECHO TV REGURGITANT PEAK GRADIENT: 32.62 MMHG
ECHO TV REGURGITANT PEAK GRADIENT: 34.72 MMHG
ECHO TV REGURGITANT PEAK GRADIENT: 38.55 MMHG
EOSINOPHIL # BLD AUTO: 0 X10E3/UL (ref 0–0.4)
EOSINOPHIL # BLD: 0 K/UL (ref 0–0.4)
EOSINOPHIL NFR BLD AUTO: 0 %
EOSINOPHIL NFR BLD: 0 % (ref 0–7)
EPITH CASTS URNS QL MICRO: ABNORMAL /LPF
EPO SERPL-ACNC: 39.8 MIU/ML (ref 2.6–18.5)
ERYTHROCYTE [DISTWIDTH] IN BLOOD BY AUTOMATED COUNT: 14 % (ref 11.7–15.4)
ERYTHROCYTE [DISTWIDTH] IN BLOOD BY AUTOMATED COUNT: 16.2 % (ref 11.7–15.4)
ERYTHROCYTE [DISTWIDTH] IN BLOOD BY AUTOMATED COUNT: 16.9 % (ref 11.7–15.4)
ERYTHROCYTE [DISTWIDTH] IN BLOOD BY AUTOMATED COUNT: 17.2 % (ref 11.5–14.5)
ERYTHROCYTE [DISTWIDTH] IN BLOOD BY AUTOMATED COUNT: 17.3 % (ref 11.5–14.5)
ERYTHROCYTE [DISTWIDTH] IN BLOOD BY AUTOMATED COUNT: 17.3 % (ref 11.7–15.4)
ERYTHROCYTE [DISTWIDTH] IN BLOOD BY AUTOMATED COUNT: 17.4 % (ref 11.5–14.5)
ERYTHROCYTE [DISTWIDTH] IN BLOOD BY AUTOMATED COUNT: 17.4 % (ref 11.5–14.5)
ERYTHROCYTE [DISTWIDTH] IN BLOOD BY AUTOMATED COUNT: 17.5 % (ref 11.5–14.5)
ERYTHROCYTE [DISTWIDTH] IN BLOOD BY AUTOMATED COUNT: 17.8 % (ref 11.5–14.5)
ERYTHROCYTE [DISTWIDTH] IN BLOOD BY AUTOMATED COUNT: 17.9 % (ref 11.5–14.5)
ERYTHROCYTE [DISTWIDTH] IN BLOOD BY AUTOMATED COUNT: 18 % (ref 11.5–14.5)
ERYTHROCYTE [DISTWIDTH] IN BLOOD BY AUTOMATED COUNT: 18.1 % (ref 11.5–14.5)
ERYTHROCYTE [DISTWIDTH] IN BLOOD BY AUTOMATED COUNT: 18.2 % (ref 11.5–14.5)
ERYTHROCYTE [DISTWIDTH] IN BLOOD BY AUTOMATED COUNT: 18.2 % (ref 11.5–14.5)
ERYTHROCYTE [DISTWIDTH] IN BLOOD BY AUTOMATED COUNT: 18.4 % (ref 11.5–14.5)
ERYTHROCYTE [DISTWIDTH] IN BLOOD BY AUTOMATED COUNT: 18.5 % (ref 11.5–14.5)
ERYTHROCYTE [DISTWIDTH] IN BLOOD BY AUTOMATED COUNT: 18.6 % (ref 11.5–14.5)
ERYTHROCYTE [DISTWIDTH] IN BLOOD BY AUTOMATED COUNT: 18.6 % (ref 11.5–14.5)
ERYTHROCYTE [DISTWIDTH] IN BLOOD BY AUTOMATED COUNT: 19.4 % (ref 11.5–14.5)
ERYTHROCYTE [DISTWIDTH] IN BLOOD BY AUTOMATED COUNT: 19.5 % (ref 11.5–14.5)
ERYTHROCYTE [DISTWIDTH] IN BLOOD BY AUTOMATED COUNT: 19.5 % (ref 11.5–14.5)
ERYTHROCYTE [DISTWIDTH] IN BLOOD BY AUTOMATED COUNT: 19.6 % (ref 11.5–14.5)
ERYTHROCYTE [DISTWIDTH] IN BLOOD BY AUTOMATED COUNT: 19.6 % (ref 11.5–14.5)
ERYTHROCYTE [DISTWIDTH] IN BLOOD BY AUTOMATED COUNT: 19.7 % (ref 11.5–14.5)
ERYTHROCYTE [DISTWIDTH] IN BLOOD BY AUTOMATED COUNT: 19.7 % (ref 11.5–14.5)
ERYTHROCYTE [DISTWIDTH] IN BLOOD BY AUTOMATED COUNT: 19.8 % (ref 11.5–14.5)
ERYTHROCYTE [DISTWIDTH] IN BLOOD BY AUTOMATED COUNT: 19.8 % (ref 11.5–14.5)
ERYTHROCYTE [DISTWIDTH] IN BLOOD BY AUTOMATED COUNT: 19.9 % (ref 11.5–14.5)
ERYTHROCYTE [DISTWIDTH] IN BLOOD BY AUTOMATED COUNT: 20 % (ref 11.5–14.5)
ERYTHROCYTE [DISTWIDTH] IN BLOOD BY AUTOMATED COUNT: 20.1 % (ref 11.5–14.5)
ERYTHROCYTE [DISTWIDTH] IN BLOOD BY AUTOMATED COUNT: 20.1 % (ref 11.5–14.5)
ERYTHROCYTE [DISTWIDTH] IN BLOOD BY AUTOMATED COUNT: 20.2 % (ref 11.5–14.5)
ERYTHROCYTE [DISTWIDTH] IN BLOOD BY AUTOMATED COUNT: 20.4 % (ref 11.5–14.5)
ERYTHROCYTE [DISTWIDTH] IN BLOOD BY AUTOMATED COUNT: 20.5 % (ref 11.5–14.5)
ERYTHROCYTE [DISTWIDTH] IN BLOOD BY AUTOMATED COUNT: 20.6 % (ref 11.5–14.5)
ERYTHROCYTE [DISTWIDTH] IN BLOOD BY AUTOMATED COUNT: 20.7 % (ref 11.5–14.5)
ERYTHROCYTE [DISTWIDTH] IN BLOOD BY AUTOMATED COUNT: 20.8 % (ref 11.5–14.5)
ERYTHROCYTE [DISTWIDTH] IN BLOOD BY AUTOMATED COUNT: 20.8 % (ref 11.5–14.5)
ERYTHROCYTE [DISTWIDTH] IN BLOOD BY AUTOMATED COUNT: 20.9 % (ref 11.5–14.5)
ERYTHROCYTE [DISTWIDTH] IN BLOOD BY AUTOMATED COUNT: 21.2 % (ref 11.5–14.5)
ERYTHROCYTE [SEDIMENTATION RATE] IN BLOOD: 59 MM/HR (ref 0–30)
ERYTHROCYTE [SEDIMENTATION RATE] IN BLOOD: 71 MM/HR (ref 0–30)
EST. AVERAGE GLUCOSE BLD GHB EST-MCNC: 160 MG/DL
FERRITIN SERPL-MCNC: 150 NG/ML (ref 8–252)
FERRITIN SERPL-MCNC: 212 NG/ML (ref 8–252)
FERRITIN SERPL-MCNC: 78 NG/ML (ref 26–388)
FIO2 ON VENT: 100 %
FLUAV H1 2009 PAND RNA SPEC QL NAA+PROBE: NOT DETECTED
FLUAV H1 RNA SPEC QL NAA+PROBE: NOT DETECTED
FLUAV H3 RNA SPEC QL NAA+PROBE: NOT DETECTED
FLUAV SUBTYP SPEC NAA+PROBE: NOT DETECTED
FLUBV RNA SPEC QL NAA+PROBE: NOT DETECTED
GAS FLOW.O2 O2 DELIVERY SYS: ABNORMAL L/MIN
GAS FLOW.O2 SETTING OXYMISER: 1.5 L/M
GAS FLOW.O2 SETTING OXYMISER: 18 L/MIN
GAS FLOW.O2 SETTING OXYMISER: 24 BPM
GAS FLOW.O2 SETTING OXYMISER: 3 L/M
GAS FLOW.O2 SETTING OXYMISER: 30 BPM
GAS FLOW.O2 SETTING OXYMISER: 5 L/M
GLOBULIN SER CALC-MCNC: 3.4 G/DL (ref 1.5–4.5)
GLOBULIN SER CALC-MCNC: 3.6 G/DL (ref 2–4)
GLOBULIN SER CALC-MCNC: 3.8 G/DL (ref 2–4)
GLOBULIN SER CALC-MCNC: 3.9 G/DL (ref 1.5–4.5)
GLOBULIN SER CALC-MCNC: 3.9 G/DL (ref 1.5–4.5)
GLOBULIN SER CALC-MCNC: 3.9 G/DL (ref 2–4)
GLOBULIN SER CALC-MCNC: 3.9 G/DL (ref 2–4)
GLOBULIN SER CALC-MCNC: 4 G/DL (ref 2–4)
GLOBULIN SER CALC-MCNC: 4.2 G/DL (ref 2–4)
GLOBULIN SER CALC-MCNC: 4.3 G/DL (ref 2–4)
GLOBULIN SER CALC-MCNC: 4.4 G/DL (ref 2–4)
GLOBULIN SER CALC-MCNC: 4.5 G/DL (ref 1.5–4.5)
GLOBULIN SER CALC-MCNC: 4.5 G/DL (ref 2–4)
GLOBULIN SER CALC-MCNC: 4.6 G/DL (ref 1.5–4.5)
GLOBULIN SER CALC-MCNC: 4.6 G/DL (ref 2–4)
GLOBULIN SER CALC-MCNC: 4.6 G/DL (ref 2–4)
GLOBULIN SER CALC-MCNC: 4.7 G/DL (ref 2–4)
GLOBULIN SER CALC-MCNC: 4.7 G/DL (ref 2–4)
GLOBULIN SER CALC-MCNC: 5 G/DL (ref 2–4)
GLOBULIN SER CALC-MCNC: 5.1 G/DL (ref 2–4)
GLOBULIN SER CALC-MCNC: 5.1 G/DL (ref 2–4)
GLOBULIN SER CALC-MCNC: 5.3 G/DL (ref 2–4)
GLOBULIN SER CALC-MCNC: 5.3 G/DL (ref 2–4)
GLOBULIN SER CALC-MCNC: 5.8 G/DL (ref 2–4)
GLUCOSE BLD STRIP.AUTO-MCNC: 100 MG/DL (ref 65–100)
GLUCOSE BLD STRIP.AUTO-MCNC: 100 MG/DL (ref 65–100)
GLUCOSE BLD STRIP.AUTO-MCNC: 102 MG/DL (ref 65–100)
GLUCOSE BLD STRIP.AUTO-MCNC: 103 MG/DL (ref 65–100)
GLUCOSE BLD STRIP.AUTO-MCNC: 104 MG/DL (ref 65–100)
GLUCOSE BLD STRIP.AUTO-MCNC: 105 MG/DL (ref 65–117)
GLUCOSE BLD STRIP.AUTO-MCNC: 108 MG/DL (ref 65–100)
GLUCOSE BLD STRIP.AUTO-MCNC: 108 MG/DL (ref 65–117)
GLUCOSE BLD STRIP.AUTO-MCNC: 109 MG/DL (ref 65–100)
GLUCOSE BLD STRIP.AUTO-MCNC: 110 MG/DL (ref 65–100)
GLUCOSE BLD STRIP.AUTO-MCNC: 110 MG/DL (ref 65–117)
GLUCOSE BLD STRIP.AUTO-MCNC: 111 MG/DL (ref 65–100)
GLUCOSE BLD STRIP.AUTO-MCNC: 111 MG/DL (ref 65–117)
GLUCOSE BLD STRIP.AUTO-MCNC: 112 MG/DL (ref 65–100)
GLUCOSE BLD STRIP.AUTO-MCNC: 113 MG/DL (ref 65–100)
GLUCOSE BLD STRIP.AUTO-MCNC: 113 MG/DL (ref 65–117)
GLUCOSE BLD STRIP.AUTO-MCNC: 114 MG/DL (ref 65–100)
GLUCOSE BLD STRIP.AUTO-MCNC: 114 MG/DL (ref 65–117)
GLUCOSE BLD STRIP.AUTO-MCNC: 115 MG/DL (ref 65–100)
GLUCOSE BLD STRIP.AUTO-MCNC: 115 MG/DL (ref 65–117)
GLUCOSE BLD STRIP.AUTO-MCNC: 116 MG/DL (ref 65–117)
GLUCOSE BLD STRIP.AUTO-MCNC: 118 MG/DL (ref 65–117)
GLUCOSE BLD STRIP.AUTO-MCNC: 119 MG/DL (ref 65–100)
GLUCOSE BLD STRIP.AUTO-MCNC: 119 MG/DL (ref 65–117)
GLUCOSE BLD STRIP.AUTO-MCNC: 120 MG/DL (ref 65–100)
GLUCOSE BLD STRIP.AUTO-MCNC: 120 MG/DL (ref 65–117)
GLUCOSE BLD STRIP.AUTO-MCNC: 121 MG/DL (ref 65–100)
GLUCOSE BLD STRIP.AUTO-MCNC: 122 MG/DL (ref 65–100)
GLUCOSE BLD STRIP.AUTO-MCNC: 122 MG/DL (ref 65–117)
GLUCOSE BLD STRIP.AUTO-MCNC: 123 MG/DL (ref 65–117)
GLUCOSE BLD STRIP.AUTO-MCNC: 124 MG/DL (ref 65–100)
GLUCOSE BLD STRIP.AUTO-MCNC: 124 MG/DL (ref 65–100)
GLUCOSE BLD STRIP.AUTO-MCNC: 124 MG/DL (ref 65–117)
GLUCOSE BLD STRIP.AUTO-MCNC: 125 MG/DL (ref 65–100)
GLUCOSE BLD STRIP.AUTO-MCNC: 125 MG/DL (ref 65–117)
GLUCOSE BLD STRIP.AUTO-MCNC: 127 MG/DL (ref 65–100)
GLUCOSE BLD STRIP.AUTO-MCNC: 127 MG/DL (ref 65–117)
GLUCOSE BLD STRIP.AUTO-MCNC: 128 MG/DL (ref 65–100)
GLUCOSE BLD STRIP.AUTO-MCNC: 128 MG/DL (ref 65–117)
GLUCOSE BLD STRIP.AUTO-MCNC: 128 MG/DL (ref 65–117)
GLUCOSE BLD STRIP.AUTO-MCNC: 129 MG/DL (ref 65–100)
GLUCOSE BLD STRIP.AUTO-MCNC: 129 MG/DL (ref 65–117)
GLUCOSE BLD STRIP.AUTO-MCNC: 130 MG/DL (ref 65–100)
GLUCOSE BLD STRIP.AUTO-MCNC: 131 MG/DL (ref 65–117)
GLUCOSE BLD STRIP.AUTO-MCNC: 132 MG/DL (ref 65–100)
GLUCOSE BLD STRIP.AUTO-MCNC: 132 MG/DL (ref 65–117)
GLUCOSE BLD STRIP.AUTO-MCNC: 136 MG/DL (ref 65–100)
GLUCOSE BLD STRIP.AUTO-MCNC: 136 MG/DL (ref 65–100)
GLUCOSE BLD STRIP.AUTO-MCNC: 136 MG/DL (ref 65–117)
GLUCOSE BLD STRIP.AUTO-MCNC: 138 MG/DL (ref 65–100)
GLUCOSE BLD STRIP.AUTO-MCNC: 139 MG/DL (ref 65–100)
GLUCOSE BLD STRIP.AUTO-MCNC: 139 MG/DL (ref 65–100)
GLUCOSE BLD STRIP.AUTO-MCNC: 141 MG/DL (ref 65–100)
GLUCOSE BLD STRIP.AUTO-MCNC: 141 MG/DL (ref 65–117)
GLUCOSE BLD STRIP.AUTO-MCNC: 143 MG/DL (ref 65–117)
GLUCOSE BLD STRIP.AUTO-MCNC: 144 MG/DL (ref 65–117)
GLUCOSE BLD STRIP.AUTO-MCNC: 145 MG/DL (ref 65–117)
GLUCOSE BLD STRIP.AUTO-MCNC: 146 MG/DL (ref 65–100)
GLUCOSE BLD STRIP.AUTO-MCNC: 147 MG/DL (ref 65–100)
GLUCOSE BLD STRIP.AUTO-MCNC: 150 MG/DL (ref 65–100)
GLUCOSE BLD STRIP.AUTO-MCNC: 152 MG/DL (ref 65–100)
GLUCOSE BLD STRIP.AUTO-MCNC: 152 MG/DL (ref 65–117)
GLUCOSE BLD STRIP.AUTO-MCNC: 154 MG/DL (ref 65–117)
GLUCOSE BLD STRIP.AUTO-MCNC: 156 MG/DL (ref 65–100)
GLUCOSE BLD STRIP.AUTO-MCNC: 156 MG/DL (ref 65–117)
GLUCOSE BLD STRIP.AUTO-MCNC: 158 MG/DL (ref 65–100)
GLUCOSE BLD STRIP.AUTO-MCNC: 161 MG/DL (ref 65–117)
GLUCOSE BLD STRIP.AUTO-MCNC: 162 MG/DL (ref 65–117)
GLUCOSE BLD STRIP.AUTO-MCNC: 166 MG/DL (ref 65–100)
GLUCOSE BLD STRIP.AUTO-MCNC: 168 MG/DL (ref 65–100)
GLUCOSE BLD STRIP.AUTO-MCNC: 168 MG/DL (ref 65–100)
GLUCOSE BLD STRIP.AUTO-MCNC: 168 MG/DL (ref 65–117)
GLUCOSE BLD STRIP.AUTO-MCNC: 170 MG/DL (ref 65–117)
GLUCOSE BLD STRIP.AUTO-MCNC: 175 MG/DL (ref 65–117)
GLUCOSE BLD STRIP.AUTO-MCNC: 178 MG/DL (ref 65–100)
GLUCOSE BLD STRIP.AUTO-MCNC: 178 MG/DL (ref 65–117)
GLUCOSE BLD STRIP.AUTO-MCNC: 180 MG/DL (ref 65–100)
GLUCOSE BLD STRIP.AUTO-MCNC: 184 MG/DL (ref 65–100)
GLUCOSE BLD STRIP.AUTO-MCNC: 190 MG/DL (ref 65–100)
GLUCOSE BLD STRIP.AUTO-MCNC: 199 MG/DL (ref 65–100)
GLUCOSE BLD STRIP.AUTO-MCNC: 216 MG/DL (ref 65–100)
GLUCOSE BLD STRIP.AUTO-MCNC: 273 MG/DL (ref 65–117)
GLUCOSE BLD STRIP.AUTO-MCNC: 65 MG/DL (ref 65–100)
GLUCOSE BLD STRIP.AUTO-MCNC: 68 MG/DL (ref 65–100)
GLUCOSE BLD STRIP.AUTO-MCNC: 72 MG/DL (ref 65–100)
GLUCOSE BLD STRIP.AUTO-MCNC: 79 MG/DL (ref 65–100)
GLUCOSE BLD STRIP.AUTO-MCNC: 83 MG/DL (ref 65–100)
GLUCOSE BLD STRIP.AUTO-MCNC: 83 MG/DL (ref 65–100)
GLUCOSE BLD STRIP.AUTO-MCNC: 83 MG/DL (ref 65–117)
GLUCOSE BLD STRIP.AUTO-MCNC: 85 MG/DL (ref 65–100)
GLUCOSE BLD STRIP.AUTO-MCNC: 86 MG/DL (ref 65–100)
GLUCOSE BLD STRIP.AUTO-MCNC: 87 MG/DL (ref 65–100)
GLUCOSE BLD STRIP.AUTO-MCNC: 87 MG/DL (ref 65–100)
GLUCOSE BLD STRIP.AUTO-MCNC: 89 MG/DL (ref 65–100)
GLUCOSE BLD STRIP.AUTO-MCNC: 89 MG/DL (ref 65–100)
GLUCOSE BLD STRIP.AUTO-MCNC: 90 MG/DL (ref 65–100)
GLUCOSE BLD STRIP.AUTO-MCNC: 91 MG/DL (ref 65–100)
GLUCOSE BLD STRIP.AUTO-MCNC: 92 MG/DL (ref 65–100)
GLUCOSE BLD STRIP.AUTO-MCNC: 92 MG/DL (ref 65–117)
GLUCOSE BLD STRIP.AUTO-MCNC: 93 MG/DL (ref 65–100)
GLUCOSE BLD STRIP.AUTO-MCNC: 94 MG/DL (ref 65–100)
GLUCOSE BLD STRIP.AUTO-MCNC: 96 MG/DL (ref 65–100)
GLUCOSE BLD STRIP.AUTO-MCNC: 96 MG/DL (ref 65–117)
GLUCOSE BLD STRIP.AUTO-MCNC: 97 MG/DL (ref 65–100)
GLUCOSE BLD STRIP.AUTO-MCNC: 98 MG/DL (ref 65–100)
GLUCOSE BLD STRIP.AUTO-MCNC: 98 MG/DL (ref 65–117)
GLUCOSE BLD STRIP.AUTO-MCNC: 99 MG/DL (ref 65–100)
GLUCOSE BLD STRIP.AUTO-MCNC: 99 MG/DL (ref 65–117)
GLUCOSE SERPL-MCNC: 100 MG/DL (ref 65–100)
GLUCOSE SERPL-MCNC: 101 MG/DL (ref 65–100)
GLUCOSE SERPL-MCNC: 104 MG/DL (ref 65–100)
GLUCOSE SERPL-MCNC: 105 MG/DL (ref 65–99)
GLUCOSE SERPL-MCNC: 107 MG/DL (ref 65–100)
GLUCOSE SERPL-MCNC: 108 MG/DL (ref 65–100)
GLUCOSE SERPL-MCNC: 109 MG/DL (ref 65–100)
GLUCOSE SERPL-MCNC: 110 MG/DL (ref 65–100)
GLUCOSE SERPL-MCNC: 110 MG/DL (ref 65–100)
GLUCOSE SERPL-MCNC: 111 MG/DL (ref 65–100)
GLUCOSE SERPL-MCNC: 112 MG/DL (ref 65–100)
GLUCOSE SERPL-MCNC: 113 MG/DL (ref 65–100)
GLUCOSE SERPL-MCNC: 116 MG/DL (ref 65–100)
GLUCOSE SERPL-MCNC: 118 MG/DL (ref 65–100)
GLUCOSE SERPL-MCNC: 119 MG/DL (ref 65–100)
GLUCOSE SERPL-MCNC: 119 MG/DL (ref 65–100)
GLUCOSE SERPL-MCNC: 121 MG/DL (ref 65–100)
GLUCOSE SERPL-MCNC: 123 MG/DL (ref 65–100)
GLUCOSE SERPL-MCNC: 124 MG/DL (ref 65–100)
GLUCOSE SERPL-MCNC: 125 MG/DL (ref 65–100)
GLUCOSE SERPL-MCNC: 125 MG/DL (ref 65–100)
GLUCOSE SERPL-MCNC: 130 MG/DL (ref 65–100)
GLUCOSE SERPL-MCNC: 130 MG/DL (ref 65–100)
GLUCOSE SERPL-MCNC: 134 MG/DL (ref 65–100)
GLUCOSE SERPL-MCNC: 135 MG/DL (ref 65–100)
GLUCOSE SERPL-MCNC: 136 MG/DL (ref 65–100)
GLUCOSE SERPL-MCNC: 141 MG/DL (ref 65–100)
GLUCOSE SERPL-MCNC: 142 MG/DL (ref 65–100)
GLUCOSE SERPL-MCNC: 147 MG/DL (ref 65–100)
GLUCOSE SERPL-MCNC: 155 MG/DL (ref 65–99)
GLUCOSE SERPL-MCNC: 159 MG/DL (ref 65–99)
GLUCOSE SERPL-MCNC: 164 MG/DL (ref 65–99)
GLUCOSE SERPL-MCNC: 172 MG/DL (ref 65–100)
GLUCOSE SERPL-MCNC: 172 MG/DL (ref 65–100)
GLUCOSE SERPL-MCNC: 179 MG/DL (ref 65–99)
GLUCOSE SERPL-MCNC: 54 MG/DL (ref 65–100)
GLUCOSE SERPL-MCNC: 54 MG/DL (ref 65–100)
GLUCOSE SERPL-MCNC: 62 MG/DL (ref 65–100)
GLUCOSE SERPL-MCNC: 73 MG/DL (ref 65–100)
GLUCOSE SERPL-MCNC: 77 MG/DL (ref 65–100)
GLUCOSE SERPL-MCNC: 79 MG/DL (ref 65–100)
GLUCOSE SERPL-MCNC: 80 MG/DL (ref 65–100)
GLUCOSE SERPL-MCNC: 81 MG/DL (ref 65–100)
GLUCOSE SERPL-MCNC: 83 MG/DL (ref 65–100)
GLUCOSE SERPL-MCNC: 84 MG/DL (ref 65–100)
GLUCOSE SERPL-MCNC: 84 MG/DL (ref 65–100)
GLUCOSE SERPL-MCNC: 86 MG/DL (ref 65–100)
GLUCOSE SERPL-MCNC: 86 MG/DL (ref 65–100)
GLUCOSE SERPL-MCNC: 87 MG/DL (ref 65–100)
GLUCOSE SERPL-MCNC: 89 MG/DL (ref 65–100)
GLUCOSE SERPL-MCNC: 89 MG/DL (ref 65–100)
GLUCOSE SERPL-MCNC: 91 MG/DL (ref 65–100)
GLUCOSE SERPL-MCNC: 91 MG/DL (ref 65–100)
GLUCOSE SERPL-MCNC: 92 MG/DL (ref 65–100)
GLUCOSE SERPL-MCNC: 96 MG/DL (ref 65–100)
GLUCOSE UR STRIP.AUTO-MCNC: NEGATIVE MG/DL
GRAM STN SPEC: ABNORMAL
HADV DNA SPEC QL NAA+PROBE: NOT DETECTED
HAV IGM SER QL: NONREACTIVE
HBA1C MFR BLD: 7.2 % (ref 4–5.6)
HBV CORE IGM SER QL: NONREACTIVE
HBV SURFACE AB SER QL: NONREACTIVE
HBV SURFACE AB SER-ACNC: <3.1 MIU/ML
HBV SURFACE AG SER QL: <0.1 INDEX
HBV SURFACE AG SER QL: NEGATIVE
HCO3 BLD-SCNC: 23.9 MMOL/L (ref 22–26)
HCO3 BLD-SCNC: 23.9 MMOL/L (ref 22–26)
HCO3 BLD-SCNC: 24.3 MMOL/L (ref 22–26)
HCO3 BLD-SCNC: 24.3 MMOL/L (ref 22–26)
HCO3 BLD-SCNC: 24.5 MMOL/L (ref 22–26)
HCO3 BLD-SCNC: 25.2 MMOL/L (ref 22–26)
HCO3 BLD-SCNC: 27.4 MMOL/L (ref 22–26)
HCO3 BLD-SCNC: 27.8 MMOL/L (ref 22–26)
HCO3 BLD-SCNC: 28.1 MMOL/L (ref 22–26)
HCO3 BLD-SCNC: 31.5 MMOL/L (ref 22–26)
HCO3 BLD-SCNC: 31.5 MMOL/L (ref 22–26)
HCO3 BLD-SCNC: 33.3 MMOL/L (ref 22–26)
HCO3 BLDA-SCNC: 22 MMOL/L (ref 22–26)
HCOV 229E RNA SPEC QL NAA+PROBE: NOT DETECTED
HCOV HKU1 RNA SPEC QL NAA+PROBE: NOT DETECTED
HCOV NL63 RNA SPEC QL NAA+PROBE: NOT DETECTED
HCOV OC43 RNA SPEC QL NAA+PROBE: NOT DETECTED
HCT VFR BLD AUTO: 20.1 % (ref 35–47)
HCT VFR BLD AUTO: 20.2 % (ref 35–47)
HCT VFR BLD AUTO: 20.5 % (ref 35–47)
HCT VFR BLD AUTO: 21.4 % (ref 35–47)
HCT VFR BLD AUTO: 23 % (ref 35–47)
HCT VFR BLD AUTO: 23.5 % (ref 35–47)
HCT VFR BLD AUTO: 24.5 % (ref 35–47)
HCT VFR BLD AUTO: 25 % (ref 35–47)
HCT VFR BLD AUTO: 25.5 % (ref 35–47)
HCT VFR BLD AUTO: 25.5 % (ref 35–47)
HCT VFR BLD AUTO: 26 % (ref 35–47)
HCT VFR BLD AUTO: 26.3 % (ref 35–47)
HCT VFR BLD AUTO: 26.4 % (ref 35–47)
HCT VFR BLD AUTO: 26.8 % (ref 35–47)
HCT VFR BLD AUTO: 26.8 % (ref 35–47)
HCT VFR BLD AUTO: 27 % (ref 35–47)
HCT VFR BLD AUTO: 27.2 % (ref 35–47)
HCT VFR BLD AUTO: 27.5 % (ref 35–47)
HCT VFR BLD AUTO: 27.6 % (ref 35–47)
HCT VFR BLD AUTO: 27.6 % (ref 35–47)
HCT VFR BLD AUTO: 27.7 % (ref 35–47)
HCT VFR BLD AUTO: 27.8 % (ref 35–47)
HCT VFR BLD AUTO: 28.1 % (ref 35–47)
HCT VFR BLD AUTO: 28.3 % (ref 34–46.6)
HCT VFR BLD AUTO: 28.3 % (ref 35–47)
HCT VFR BLD AUTO: 28.5 % (ref 34–46.6)
HCT VFR BLD AUTO: 28.5 % (ref 35–47)
HCT VFR BLD AUTO: 28.6 % (ref 34–46.6)
HCT VFR BLD AUTO: 28.7 % (ref 34–46.6)
HCT VFR BLD AUTO: 28.7 % (ref 35–47)
HCT VFR BLD AUTO: 28.9 % (ref 35–47)
HCT VFR BLD AUTO: 29 % (ref 35–47)
HCT VFR BLD AUTO: 29.2 % (ref 35–47)
HCT VFR BLD AUTO: 29.5 % (ref 35–47)
HCT VFR BLD AUTO: 29.7 % (ref 35–47)
HCT VFR BLD AUTO: 29.8 % (ref 35–47)
HCT VFR BLD AUTO: 29.9 % (ref 35–47)
HCT VFR BLD AUTO: 29.9 % (ref 35–47)
HCT VFR BLD AUTO: 30 % (ref 35–47)
HCT VFR BLD AUTO: 30.1 % (ref 35–47)
HCT VFR BLD AUTO: 30.2 % (ref 35–47)
HCT VFR BLD AUTO: 30.4 % (ref 35–47)
HCT VFR BLD AUTO: 30.4 % (ref 35–47)
HCT VFR BLD AUTO: 31.1 % (ref 35–47)
HCT VFR BLD AUTO: 31.2 % (ref 35–47)
HCT VFR BLD AUTO: 31.5 % (ref 35–47)
HCT VFR BLD AUTO: 31.9 % (ref 35–47)
HCT VFR BLD AUTO: 35.7 % (ref 35–47)
HCT VFR BLD AUTO: 36.8 % (ref 35–47)
HCV AB SERPL QL IA: NONREACTIVE
HCV COMMENT,HCGAC: NORMAL
HDLC SERPL-MCNC: 33 MG/DL
HDLC SERPL: 3.9 {RATIO} (ref 0–5)
HEMOCCULT STL QL: NEGATIVE
HGB BLD-MCNC: 10.5 G/DL (ref 11.5–16)
HGB BLD-MCNC: 10.9 G/DL (ref 11.5–16)
HGB BLD-MCNC: 6.3 G/DL (ref 11.5–16)
HGB BLD-MCNC: 6.4 G/DL (ref 11.5–16)
HGB BLD-MCNC: 6.5 G/DL (ref 11.5–16)
HGB BLD-MCNC: 6.7 G/DL (ref 11.5–16)
HGB BLD-MCNC: 6.8 G/DL (ref 11.5–16)
HGB BLD-MCNC: 7.4 G/DL (ref 11.5–16)
HGB BLD-MCNC: 7.4 G/DL (ref 11.5–16)
HGB BLD-MCNC: 7.5 G/DL (ref 11.5–16)
HGB BLD-MCNC: 7.6 G/DL (ref 11.5–16)
HGB BLD-MCNC: 7.7 G/DL (ref 11.5–16)
HGB BLD-MCNC: 7.8 G/DL (ref 11.5–16)
HGB BLD-MCNC: 8 G/DL (ref 11.5–16)
HGB BLD-MCNC: 8.1 G/DL (ref 11.5–16)
HGB BLD-MCNC: 8.2 G/DL (ref 11.5–16)
HGB BLD-MCNC: 8.3 G/DL (ref 11.5–16)
HGB BLD-MCNC: 8.4 G/DL (ref 11.5–16)
HGB BLD-MCNC: 8.5 G/DL (ref 11.5–16)
HGB BLD-MCNC: 8.6 G/DL (ref 11.5–16)
HGB BLD-MCNC: 8.7 G/DL (ref 11.5–16)
HGB BLD-MCNC: 8.8 G/DL (ref 11.5–16)
HGB BLD-MCNC: 8.9 G/DL (ref 11.5–16)
HGB BLD-MCNC: 9 G/DL (ref 11.1–15.9)
HGB BLD-MCNC: 9 G/DL (ref 11.5–16)
HGB BLD-MCNC: 9 G/DL (ref 11.5–16)
HGB BLD-MCNC: 9.1 G/DL (ref 11.1–15.9)
HGB BLD-MCNC: 9.1 G/DL (ref 11.1–15.9)
HGB BLD-MCNC: 9.1 G/DL (ref 11.5–16)
HGB BLD-MCNC: 9.1 G/DL (ref 11.5–16)
HGB BLD-MCNC: 9.3 G/DL (ref 11.5–16)
HGB BLD-MCNC: 9.3 G/DL (ref 11.5–16)
HGB BLD-MCNC: 9.4 G/DL (ref 11.1–15.9)
HGB FREE PLAS-MCNC: 0.6 MG/DL (ref 0–4.9)
HGB UR QL STRIP: ABNORMAL
HGB UR QL STRIP: NEGATIVE
HISTORY CHECKED?,CKHIST: NORMAL
HIV 1+2 AB+HIV1 P24 AG SERPL QL IA: NONREACTIVE
HIV12 RESULT COMMENT, HHIVC: NORMAL
HMPV RNA SPEC QL NAA+PROBE: NOT DETECTED
HPIV1 RNA SPEC QL NAA+PROBE: NOT DETECTED
HPIV2 RNA SPEC QL NAA+PROBE: NOT DETECTED
HPIV3 RNA SPEC QL NAA+PROBE: NOT DETECTED
HPIV4 RNA SPEC QL NAA+PROBE: NOT DETECTED
HYALINE CASTS URNS QL MICRO: ABNORMAL /LPF (ref 0–5)
HYALINE CASTS URNS QL MICRO: ABNORMAL /LPF (ref 0–5)
IMM GRANULOCYTES # BLD AUTO: 0 K/UL
IMM GRANULOCYTES # BLD AUTO: 0 K/UL (ref 0–0.04)
IMM GRANULOCYTES # BLD AUTO: 0.1 K/UL (ref 0–0.04)
IMM GRANULOCYTES NFR BLD AUTO: 0 %
IMM GRANULOCYTES NFR BLD AUTO: 0 % (ref 0–0.5)
IMM GRANULOCYTES NFR BLD AUTO: 1 % (ref 0–0.5)
IMM GRANULOCYTES NFR BLD AUTO: 2 % (ref 0–0.5)
INR PPP: 1.3 (ref 0.9–1.1)
INR PPP: 1.3 (ref 0.9–1.1)
INR PPP: 1.4 (ref 0.9–1.1)
INR PPP: 1.4 (ref 0.9–1.1)
INR PPP: 1.5 (ref 0.9–1.1)
INR PPP: 1.5 (ref 0.9–1.1)
INR PPP: 1.6 (ref 0.9–1.1)
INR PPP: 1.7 (ref 0.9–1.2)
INR PPP: 1.8 (ref 0.9–1.1)
INR PPP: 1.9 (ref 0.9–1.1)
INR PPP: 2 (ref 0.9–1.1)
INR PPP: 2 (ref 0.9–1.1)
INR PPP: 2 (ref 0.9–1.2)
INR PPP: 2.1 (ref 0.9–1.1)
INR PPP: 2.2 (ref 0.9–1.1)
INR PPP: 2.3 (ref 0.9–1.1)
INR PPP: 2.4 (ref 0.9–1.1)
INR PPP: 2.4 (ref 0.9–1.1)
INR PPP: 2.5 (ref 0.9–1.1)
INR PPP: 2.5 (ref 0.9–1.1)
INR PPP: 2.6 (ref 0.9–1.1)
INR PPP: 2.7 (ref 0.9–1.1)
INR PPP: 2.9 (ref 0.9–1.1)
INR PPP: 3 (ref 0.9–1.1)
INR PPP: 3.1 (ref 0.9–1.1)
INR PPP: 3.2 (ref 0.9–1.1)
INR PPP: 3.3 (ref 0.9–1.1)
INR PPP: 3.3 (ref 0.9–1.1)
INR PPP: 3.4 (ref 0.9–1.1)
INR PPP: 3.4 (ref 0.9–1.2)
INR PPP: 3.6 (ref 0.9–1.1)
INR PPP: 3.6 (ref 0.9–1.1)
INR PPP: 3.8 (ref 0.9–1.1)
INR PPP: 3.8 (ref 0.9–1.1)
INR PPP: 3.9 (ref 0.9–1.1)
INR PPP: 3.9 (ref 0.9–1.1)
INR PPP: 4.1 (ref 0.9–1.1)
INR PPP: 4.2 (ref 0.9–1.1)
INR, EXTERNAL: 1.4
INR, EXTERNAL: 1.5
INR, EXTERNAL: 1.6
INR, EXTERNAL: 1.7
INR, EXTERNAL: 1.8
INR, EXTERNAL: 2
INR, EXTERNAL: 2.1
INR, EXTERNAL: 2.2
INR, EXTERNAL: 2.2
INR, EXTERNAL: 2.8
INR, EXTERNAL: 3.2
INR, EXTERNAL: 3.2
INR, EXTERNAL: 3.4
INR, EXTERNAL: 3.5
INR, EXTERNAL: 3.9
INR, EXTERNAL: 4.9
IRON SATN MFR SERPL: 10 % (ref 20–50)
IRON SATN MFR SERPL: 10 % (ref 20–50)
IRON SATN MFR SERPL: 40 % (ref 20–50)
IRON SATN MFR SERPL: 9 % (ref 20–50)
IRON SERPL-MCNC: 22 UG/DL (ref 35–150)
IRON SERPL-MCNC: 22 UG/DL (ref 35–150)
IRON SERPL-MCNC: 25 UG/DL (ref 35–150)
IRON SERPL-MCNC: 51 UG/DL (ref 35–150)
KETONES UR QL STRIP.AUTO: ABNORMAL MG/DL
KETONES UR QL STRIP.AUTO: NEGATIVE MG/DL
L PNEUMO1 AG UR QL IA: NEGATIVE
LACTATE SERPL-SCNC: 0.4 MMOL/L (ref 0.4–2)
LACTATE SERPL-SCNC: 0.4 MMOL/L (ref 0.4–2)
LACTATE SERPL-SCNC: 0.5 MMOL/L (ref 0.4–2)
LACTATE SERPL-SCNC: 0.6 MMOL/L (ref 0.4–2)
LACTATE SERPL-SCNC: 0.7 MMOL/L (ref 0.4–2)
LACTATE SERPL-SCNC: 0.8 MMOL/L (ref 0.4–2)
LACTATE SERPL-SCNC: 0.9 MMOL/L (ref 0.4–2)
LACTATE SERPL-SCNC: 1 MMOL/L (ref 0.4–2)
LACTATE SERPL-SCNC: 1.1 MMOL/L (ref 0.4–2)
LACTATE SERPL-SCNC: 1.1 MMOL/L (ref 0.4–2)
LACTATE SERPL-SCNC: 1.4 MMOL/L (ref 0.4–2)
LACTATE SERPL-SCNC: 1.9 MMOL/L (ref 0.4–2)
LACTATE SERPL-SCNC: 2.2 MMOL/L (ref 0.4–2)
LDH SERPL L TO P-CCNC: 151 U/L (ref 81–246)
LDH SERPL L TO P-CCNC: 156 U/L (ref 81–246)
LDH SERPL L TO P-CCNC: 156 U/L (ref 81–246)
LDH SERPL L TO P-CCNC: 160 U/L (ref 81–246)
LDH SERPL L TO P-CCNC: 164 U/L (ref 81–246)
LDH SERPL L TO P-CCNC: 168 U/L (ref 81–246)
LDH SERPL L TO P-CCNC: 169 U/L (ref 81–246)
LDH SERPL L TO P-CCNC: 172 U/L (ref 81–246)
LDH SERPL L TO P-CCNC: 173 U/L (ref 81–246)
LDH SERPL L TO P-CCNC: 174 U/L (ref 81–246)
LDH SERPL L TO P-CCNC: 176 U/L (ref 81–246)
LDH SERPL L TO P-CCNC: 176 U/L (ref 81–246)
LDH SERPL L TO P-CCNC: 177 U/L (ref 81–246)
LDH SERPL L TO P-CCNC: 178 U/L (ref 81–246)
LDH SERPL L TO P-CCNC: 178 U/L (ref 81–246)
LDH SERPL L TO P-CCNC: 179 U/L (ref 81–246)
LDH SERPL L TO P-CCNC: 185 U/L (ref 81–246)
LDH SERPL L TO P-CCNC: 186 U/L (ref 81–246)
LDH SERPL L TO P-CCNC: 188 U/L (ref 81–246)
LDH SERPL L TO P-CCNC: 189 U/L (ref 81–246)
LDH SERPL L TO P-CCNC: 189 U/L (ref 81–246)
LDH SERPL L TO P-CCNC: 192 U/L (ref 81–246)
LDH SERPL L TO P-CCNC: 194 U/L (ref 81–246)
LDH SERPL L TO P-CCNC: 195 U/L (ref 81–246)
LDH SERPL L TO P-CCNC: 197 U/L (ref 81–246)
LDH SERPL L TO P-CCNC: 199 U/L (ref 81–246)
LDH SERPL L TO P-CCNC: 204 U/L (ref 81–246)
LDH SERPL L TO P-CCNC: 210 U/L (ref 81–246)
LDH SERPL L TO P-CCNC: 214 U/L (ref 81–246)
LDH SERPL L TO P-CCNC: 215 U/L (ref 81–246)
LDH SERPL L TO P-CCNC: 216 U/L (ref 81–246)
LDH SERPL L TO P-CCNC: 228 U/L (ref 81–246)
LDH SERPL L TO P-CCNC: 231 U/L (ref 81–246)
LDH SERPL L TO P-CCNC: 279 U/L (ref 81–246)
LDH SERPL L TO P-CCNC: 299 U/L (ref 81–246)
LDH SERPL L TO P-CCNC: 319 U/L (ref 81–246)
LDH SERPL L TO P-CCNC: 354 U/L (ref 81–246)
LDH SERPL L TO P-CCNC: 402 U/L (ref 81–246)
LDH SERPL-CCNC: 208 IU/L (ref 119–226)
LDH SERPL-CCNC: 226 IU/L (ref 119–226)
LDH SERPL-CCNC: 297 IU/L (ref 119–226)
LDLC SERPL CALC-MCNC: 74 MG/DL (ref 0–100)
LEUKOCYTE ESTERASE UR QL STRIP.AUTO: ABNORMAL
LEUKOCYTE ESTERASE UR QL STRIP.AUTO: NEGATIVE
LIPID PROFILE,FLP: NORMAL
LYMPHOCYTES # BLD AUTO: 0.4 X10E3/UL (ref 0.7–3.1)
LYMPHOCYTES # BLD: 0.2 K/UL (ref 0.8–3.5)
LYMPHOCYTES # BLD: 0.3 K/UL (ref 0.8–3.5)
LYMPHOCYTES # BLD: 0.3 K/UL (ref 0.8–3.5)
LYMPHOCYTES # BLD: 0.4 K/UL (ref 0.8–3.5)
LYMPHOCYTES # BLD: 0.5 K/UL (ref 0.8–3.5)
LYMPHOCYTES # BLD: 0.6 K/UL (ref 0.8–3.5)
LYMPHOCYTES # BLD: 0.7 K/UL (ref 0.8–3.5)
LYMPHOCYTES # BLD: 0.7 K/UL (ref 0.8–3.5)
LYMPHOCYTES # BLD: 0.8 K/UL (ref 0.8–3.5)
LYMPHOCYTES # BLD: 0.9 K/UL (ref 0.8–3.5)
LYMPHOCYTES # BLD: 1.3 K/UL (ref 0.8–3.5)
LYMPHOCYTES NFR BLD AUTO: 16 %
LYMPHOCYTES NFR BLD: 10 % (ref 12–49)
LYMPHOCYTES NFR BLD: 11 % (ref 12–49)
LYMPHOCYTES NFR BLD: 12 % (ref 12–49)
LYMPHOCYTES NFR BLD: 13 % (ref 12–49)
LYMPHOCYTES NFR BLD: 14 % (ref 12–49)
LYMPHOCYTES NFR BLD: 15 % (ref 12–49)
LYMPHOCYTES NFR BLD: 15 % (ref 12–49)
LYMPHOCYTES NFR BLD: 16 % (ref 12–49)
LYMPHOCYTES NFR BLD: 17 % (ref 12–49)
LYMPHOCYTES NFR BLD: 18 % (ref 12–49)
LYMPHOCYTES NFR BLD: 22 % (ref 12–49)
LYMPHOCYTES NFR BLD: 24 % (ref 12–49)
LYMPHOCYTES NFR BLD: 4 % (ref 12–49)
LYMPHOCYTES NFR BLD: 5 % (ref 12–49)
LYMPHOCYTES NFR BLD: 5 % (ref 12–49)
LYMPHOCYTES NFR BLD: 7 % (ref 12–49)
LYMPHOCYTES NFR BLD: 9 % (ref 12–49)
LYMPHOCYTES NFR BLD: 9 % (ref 12–49)
M PNEUMO DNA SPEC QL NAA+PROBE: NOT DETECTED
MAGNESIUM SERPL-MCNC: 1.8 MG/DL (ref 1.6–2.3)
MAGNESIUM SERPL-MCNC: 1.9 MG/DL (ref 1.6–2.3)
MAGNESIUM SERPL-MCNC: 1.9 MG/DL (ref 1.6–2.4)
MAGNESIUM SERPL-MCNC: 2.1 MG/DL (ref 1.6–2.3)
MAGNESIUM SERPL-MCNC: 2.1 MG/DL (ref 1.6–2.3)
MAGNESIUM SERPL-MCNC: 2.1 MG/DL (ref 1.6–2.4)
MAGNESIUM SERPL-MCNC: 2.1 MG/DL (ref 1.6–2.4)
MAGNESIUM SERPL-MCNC: 2.2 MG/DL (ref 1.6–2.4)
MAGNESIUM SERPL-MCNC: 2.2 MG/DL (ref 1.6–2.4)
MAGNESIUM SERPL-MCNC: 2.3 MG/DL (ref 1.6–2.3)
MAGNESIUM SERPL-MCNC: 2.3 MG/DL (ref 1.6–2.4)
MAGNESIUM SERPL-MCNC: 2.4 MG/DL (ref 1.6–2.4)
MAGNESIUM SERPL-MCNC: 2.5 MG/DL (ref 1.6–2.4)
MAGNESIUM SERPL-MCNC: 2.6 MG/DL (ref 1.6–2.4)
MAGNESIUM SERPL-MCNC: 2.7 MG/DL (ref 1.6–2.4)
MAGNESIUM SERPL-MCNC: 2.8 MG/DL (ref 1.6–2.4)
MAGNESIUM SERPL-MCNC: 2.8 MG/DL (ref 1.6–2.4)
MAGNESIUM SERPL-MCNC: 2.9 MG/DL (ref 1.6–2.4)
MAGNESIUM SERPL-MCNC: 3.2 MG/DL (ref 1.6–2.4)
MAGNESIUM SERPL-MCNC: 3.4 MG/DL (ref 1.6–2.4)
MCH RBC QN AUTO: 25.5 PG (ref 26–34)
MCH RBC QN AUTO: 25.8 PG (ref 26–34)
MCH RBC QN AUTO: 25.8 PG (ref 26–34)
MCH RBC QN AUTO: 25.9 PG (ref 26–34)
MCH RBC QN AUTO: 26 PG (ref 26–34)
MCH RBC QN AUTO: 26.1 PG (ref 26.6–33)
MCH RBC QN AUTO: 26.1 PG (ref 26–34)
MCH RBC QN AUTO: 26.2 PG (ref 26–34)
MCH RBC QN AUTO: 26.3 PG (ref 26–34)
MCH RBC QN AUTO: 26.3 PG (ref 26–34)
MCH RBC QN AUTO: 26.4 PG (ref 26–34)
MCH RBC QN AUTO: 26.4 PG (ref 26–34)
MCH RBC QN AUTO: 26.6 PG (ref 26–34)
MCH RBC QN AUTO: 26.7 PG (ref 26–34)
MCH RBC QN AUTO: 26.8 PG (ref 26–34)
MCH RBC QN AUTO: 26.9 PG (ref 26–34)
MCH RBC QN AUTO: 26.9 PG (ref 26–34)
MCH RBC QN AUTO: 27 PG (ref 26–34)
MCH RBC QN AUTO: 27.1 PG (ref 26–34)
MCH RBC QN AUTO: 27.3 PG (ref 26–34)
MCH RBC QN AUTO: 27.4 PG (ref 26–34)
MCH RBC QN AUTO: 27.4 PG (ref 26–34)
MCH RBC QN AUTO: 27.5 PG (ref 26–34)
MCH RBC QN AUTO: 27.6 PG (ref 26–34)
MCH RBC QN AUTO: 27.6 PG (ref 26–34)
MCH RBC QN AUTO: 27.9 PG (ref 26–34)
MCH RBC QN AUTO: 28.4 PG (ref 26.6–33)
MCH RBC QN AUTO: 28.5 PG (ref 26.6–33)
MCH RBC QN AUTO: 29.6 PG (ref 26.6–33)
MCH RBC QN AUTO: 29.8 PG (ref 26–34)
MCH RBC QN AUTO: 29.9 PG (ref 26–34)
MCH RBC QN AUTO: 30.3 PG (ref 26–34)
MCH RBC QN AUTO: 30.5 PG (ref 26–34)
MCHC RBC AUTO-ENTMCNC: 28.5 G/DL (ref 30–36.5)
MCHC RBC AUTO-ENTMCNC: 28.7 G/DL (ref 30–36.5)
MCHC RBC AUTO-ENTMCNC: 28.8 G/DL (ref 30–36.5)
MCHC RBC AUTO-ENTMCNC: 28.8 G/DL (ref 30–36.5)
MCHC RBC AUTO-ENTMCNC: 28.9 G/DL (ref 30–36.5)
MCHC RBC AUTO-ENTMCNC: 29 G/DL (ref 30–36.5)
MCHC RBC AUTO-ENTMCNC: 29 G/DL (ref 30–36.5)
MCHC RBC AUTO-ENTMCNC: 29.1 G/DL (ref 30–36.5)
MCHC RBC AUTO-ENTMCNC: 29.2 G/DL (ref 30–36.5)
MCHC RBC AUTO-ENTMCNC: 29.3 G/DL (ref 30–36.5)
MCHC RBC AUTO-ENTMCNC: 29.4 G/DL (ref 30–36.5)
MCHC RBC AUTO-ENTMCNC: 29.5 G/DL (ref 30–36.5)
MCHC RBC AUTO-ENTMCNC: 29.5 G/DL (ref 30–36.5)
MCHC RBC AUTO-ENTMCNC: 29.6 G/DL (ref 30–36.5)
MCHC RBC AUTO-ENTMCNC: 29.7 G/DL (ref 30–36.5)
MCHC RBC AUTO-ENTMCNC: 29.8 G/DL (ref 30–36.5)
MCHC RBC AUTO-ENTMCNC: 29.9 G/DL (ref 30–36.5)
MCHC RBC AUTO-ENTMCNC: 29.9 G/DL (ref 30–36.5)
MCHC RBC AUTO-ENTMCNC: 30 G/DL (ref 30–36.5)
MCHC RBC AUTO-ENTMCNC: 30 G/DL (ref 30–36.5)
MCHC RBC AUTO-ENTMCNC: 30.1 G/DL (ref 30–36.5)
MCHC RBC AUTO-ENTMCNC: 30.4 G/DL (ref 30–36.5)
MCHC RBC AUTO-ENTMCNC: 31.3 G/DL (ref 30–36.5)
MCHC RBC AUTO-ENTMCNC: 31.4 G/DL (ref 31.5–35.7)
MCHC RBC AUTO-ENTMCNC: 31.8 G/DL (ref 30–36.5)
MCHC RBC AUTO-ENTMCNC: 31.8 G/DL (ref 31.5–35.7)
MCHC RBC AUTO-ENTMCNC: 32.2 G/DL (ref 30–36.5)
MCHC RBC AUTO-ENTMCNC: 32.2 G/DL (ref 30–36.5)
MCHC RBC AUTO-ENTMCNC: 32.2 G/DL (ref 31.5–35.7)
MCHC RBC AUTO-ENTMCNC: 32.4 G/DL (ref 30–36.5)
MCHC RBC AUTO-ENTMCNC: 32.7 G/DL (ref 30–36.5)
MCHC RBC AUTO-ENTMCNC: 33 G/DL (ref 31.5–35.7)
MCV RBC AUTO: 83 FL (ref 79–97)
MCV RBC AUTO: 88.2 FL (ref 80–99)
MCV RBC AUTO: 88.3 FL (ref 80–99)
MCV RBC AUTO: 88.7 FL (ref 80–99)
MCV RBC AUTO: 88.8 FL (ref 80–99)
MCV RBC AUTO: 88.9 FL (ref 80–99)
MCV RBC AUTO: 89 FL (ref 79–97)
MCV RBC AUTO: 89 FL (ref 79–97)
MCV RBC AUTO: 89.4 FL (ref 80–99)
MCV RBC AUTO: 89.5 FL (ref 80–99)
MCV RBC AUTO: 89.5 FL (ref 80–99)
MCV RBC AUTO: 89.6 FL (ref 80–99)
MCV RBC AUTO: 90 FL (ref 79–97)
MCV RBC AUTO: 90.1 FL (ref 80–99)
MCV RBC AUTO: 90.2 FL (ref 80–99)
MCV RBC AUTO: 90.3 FL (ref 80–99)
MCV RBC AUTO: 90.3 FL (ref 80–99)
MCV RBC AUTO: 90.5 FL (ref 80–99)
MCV RBC AUTO: 90.6 FL (ref 80–99)
MCV RBC AUTO: 90.7 FL (ref 80–99)
MCV RBC AUTO: 90.7 FL (ref 80–99)
MCV RBC AUTO: 90.9 FL (ref 80–99)
MCV RBC AUTO: 91 FL (ref 80–99)
MCV RBC AUTO: 91.1 FL (ref 80–99)
MCV RBC AUTO: 91.2 FL (ref 80–99)
MCV RBC AUTO: 91.2 FL (ref 80–99)
MCV RBC AUTO: 91.4 FL (ref 80–99)
MCV RBC AUTO: 91.4 FL (ref 80–99)
MCV RBC AUTO: 91.7 FL (ref 80–99)
MCV RBC AUTO: 91.8 FL (ref 80–99)
MCV RBC AUTO: 91.9 FL (ref 80–99)
MCV RBC AUTO: 92 FL (ref 80–99)
MCV RBC AUTO: 92 FL (ref 80–99)
MCV RBC AUTO: 92.2 FL (ref 80–99)
MCV RBC AUTO: 92.3 FL (ref 80–99)
MCV RBC AUTO: 92.3 FL (ref 80–99)
MCV RBC AUTO: 92.8 FL (ref 80–99)
MCV RBC AUTO: 92.9 FL (ref 80–99)
MCV RBC AUTO: 93.1 FL (ref 80–99)
MCV RBC AUTO: 93.3 FL (ref 80–99)
MCV RBC AUTO: 93.9 FL (ref 80–99)
MCV RBC AUTO: 94 FL (ref 80–99)
MCV RBC AUTO: 94.1 FL (ref 80–99)
MCV RBC AUTO: 94.3 FL (ref 80–99)
MCV RBC AUTO: 94.7 FL (ref 80–99)
MCV RBC AUTO: 94.8 FL (ref 80–99)
MCV RBC AUTO: 95.3 FL (ref 80–99)
METAMYELOCYTES NFR BLD MANUAL: 1 %
MICROALBUMIN UR-MCNC: 16.5 MG/DL
MICROALBUMIN/CREAT UR-RTO: 183 MG/G (ref 0–30)
MIXING STUDIES PPP-IMP: ABNORMAL
MONOCYTES # BLD AUTO: 0.2 X10E3/UL (ref 0.1–0.9)
MONOCYTES # BLD: 0.1 K/UL (ref 0–1)
MONOCYTES # BLD: 0.2 K/UL (ref 0–1)
MONOCYTES # BLD: 0.3 K/UL (ref 0–1)
MONOCYTES # BLD: 0.4 K/UL (ref 0–1)
MONOCYTES # BLD: 0.5 K/UL (ref 0–1)
MONOCYTES # BLD: 0.6 K/UL (ref 0–1)
MONOCYTES # BLD: 0.6 K/UL (ref 0–1)
MONOCYTES # BLD: 0.9 K/UL (ref 0–1)
MONOCYTES NFR BLD AUTO: 7 %
MONOCYTES NFR BLD: 10 % (ref 5–13)
MONOCYTES NFR BLD: 11 % (ref 5–13)
MONOCYTES NFR BLD: 12 % (ref 5–13)
MONOCYTES NFR BLD: 13 % (ref 5–13)
MONOCYTES NFR BLD: 4 % (ref 5–13)
MONOCYTES NFR BLD: 5 % (ref 5–13)
MONOCYTES NFR BLD: 6 % (ref 5–13)
MONOCYTES NFR BLD: 7 % (ref 5–13)
MONOCYTES NFR BLD: 7 % (ref 5–13)
MONOCYTES NFR BLD: 8 % (ref 5–13)
MONOCYTES NFR BLD: 9 % (ref 5–13)
NEUTROPHILS # BLD AUTO: 2 X10E3/UL (ref 1.4–7)
NEUTROPHILS NFR BLD AUTO: 76 %
NEUTS BAND NFR BLD MANUAL: 11 % (ref 0–6)
NEUTS BAND NFR BLD MANUAL: 8 % (ref 0–6)
NEUTS BAND NFR BLD MANUAL: 8 % (ref 0–6)
NEUTS SEG # BLD: 1.9 K/UL (ref 1.8–8)
NEUTS SEG # BLD: 1.9 K/UL (ref 1.8–8)
NEUTS SEG # BLD: 2.1 K/UL (ref 1.8–8)
NEUTS SEG # BLD: 2.3 K/UL (ref 1.8–8)
NEUTS SEG # BLD: 2.4 K/UL (ref 1.8–8)
NEUTS SEG # BLD: 2.5 K/UL (ref 1.8–8)
NEUTS SEG # BLD: 2.6 K/UL (ref 1.8–8)
NEUTS SEG # BLD: 2.7 K/UL (ref 1.8–8)
NEUTS SEG # BLD: 2.8 K/UL (ref 1.8–8)
NEUTS SEG # BLD: 2.9 K/UL (ref 1.8–8)
NEUTS SEG # BLD: 3 K/UL (ref 1.8–8)
NEUTS SEG # BLD: 3.2 K/UL (ref 1.8–8)
NEUTS SEG # BLD: 3.2 K/UL (ref 1.8–8)
NEUTS SEG # BLD: 3.4 K/UL (ref 1.8–8)
NEUTS SEG # BLD: 3.5 K/UL (ref 1.8–8)
NEUTS SEG # BLD: 3.6 K/UL (ref 1.8–8)
NEUTS SEG # BLD: 3.7 K/UL (ref 1.8–8)
NEUTS SEG # BLD: 3.8 K/UL (ref 1.8–8)
NEUTS SEG # BLD: 3.8 K/UL (ref 1.8–8)
NEUTS SEG # BLD: 3.9 K/UL (ref 1.8–8)
NEUTS SEG # BLD: 6.2 K/UL (ref 1.8–8)
NEUTS SEG # BLD: 6.3 K/UL (ref 1.8–8)
NEUTS SEG # BLD: 6.5 K/UL (ref 1.8–8)
NEUTS SEG # BLD: 7 K/UL (ref 1.8–8)
NEUTS SEG # BLD: 9.9 K/UL (ref 1.8–8)
NEUTS SEG NFR BLD: 62 % (ref 32–75)
NEUTS SEG NFR BLD: 65 % (ref 32–75)
NEUTS SEG NFR BLD: 68 % (ref 32–75)
NEUTS SEG NFR BLD: 70 % (ref 32–75)
NEUTS SEG NFR BLD: 70 % (ref 32–75)
NEUTS SEG NFR BLD: 71 % (ref 32–75)
NEUTS SEG NFR BLD: 71 % (ref 32–75)
NEUTS SEG NFR BLD: 72 % (ref 32–75)
NEUTS SEG NFR BLD: 73 % (ref 32–75)
NEUTS SEG NFR BLD: 74 % (ref 32–75)
NEUTS SEG NFR BLD: 75 % (ref 32–75)
NEUTS SEG NFR BLD: 76 % (ref 32–75)
NEUTS SEG NFR BLD: 78 % (ref 32–75)
NEUTS SEG NFR BLD: 79 % (ref 32–75)
NEUTS SEG NFR BLD: 80 % (ref 32–75)
NEUTS SEG NFR BLD: 80 % (ref 32–75)
NEUTS SEG NFR BLD: 82 % (ref 32–75)
NEUTS SEG NFR BLD: 83 % (ref 32–75)
NEUTS SEG NFR BLD: 87 % (ref 32–75)
NEUTS SEG NFR BLD: 88 % (ref 32–75)
NITRITE UR QL STRIP.AUTO: NEGATIVE
NITRITE UR QL STRIP.AUTO: POSITIVE
NITRITE UR QL STRIP.AUTO: POSITIVE
NRBC # BLD: 0 K/UL (ref 0–0.01)
NRBC BLD-RTO: 0 PER 100 WBC
NT-PROBNP SERPL-MCNC: 5272 PG/ML (ref 0–301)
NT-PROBNP SERPL-MCNC: 7361 PG/ML (ref 0–301)
NT-PROBNP SERPL-MCNC: 7720 PG/ML (ref 0–301)
NT-PROBNP SERPL-MCNC: 8957 PG/ML (ref 0–301)
NT-PROBNP SERPL-MCNC: ABNORMAL PG/ML (ref 0–301)
O2/TOTAL GAS SETTING VFR VENT: 21 %
O2/TOTAL GAS SETTING VFR VENT: 21 %
O2/TOTAL GAS SETTING VFR VENT: 40 %
O2/TOTAL GAS SETTING VFR VENT: 40 %
P-R INTERVAL, ECG05: 232 MS
PCO2 BLD: 32 MMHG (ref 35–45)
PCO2 BLD: 37.5 MMHG (ref 35–45)
PCO2 BLD: 38.3 MMHG (ref 35–45)
PCO2 BLD: 40.2 MMHG (ref 35–45)
PCO2 BLD: 40.2 MMHG (ref 35–45)
PCO2 BLD: 41.1 MMHG (ref 35–45)
PCO2 BLD: 44.3 MMHG (ref 35–45)
PCO2 BLD: 44.8 MMHG (ref 35–45)
PCO2 BLD: 47.7 MMHG (ref 35–45)
PCO2 BLD: 48.9 MMHG (ref 35–45)
PCO2 BLD: 49.5 MMHG (ref 35–45)
PCO2 BLD: 57.7 MMHG (ref 35–45)
PCO2 BLDA: 56 MMHG (ref 35–45)
PEEP RESPIRATORY: 10 CMH2O
PEEP RESPIRATORY: 10 CM[H2O]
PEEP RESPIRATORY: 5 CMH2O
PEEP RESPIRATORY: 8 CMH2O
PERIPHERAL SMEAR,PSM: NORMAL
PH BLD: 7.35 [PH] (ref 7.35–7.45)
PH BLD: 7.37 [PH] (ref 7.35–7.45)
PH BLD: 7.37 [PH] (ref 7.35–7.45)
PH BLD: 7.38 [PH] (ref 7.35–7.45)
PH BLD: 7.39 [PH] (ref 7.35–7.45)
PH BLD: 7.41 [PH] (ref 7.35–7.45)
PH BLD: 7.42 [PH] (ref 7.35–7.45)
PH BLD: 7.46 [PH] (ref 7.35–7.45)
PH BLD: 7.48 [PH] (ref 7.35–7.45)
PH BLDA: 7.22 [PH] (ref 7.35–7.45)
PH UR STRIP: 5 [PH] (ref 5–8)
PH UR STRIP: 6.5 [PH] (ref 5–8)
PH UR STRIP: 6.5 [PH] (ref 5–8)
PHOSPHATE SERPL-MCNC: 1.8 MG/DL (ref 2.6–4.7)
PHOSPHATE SERPL-MCNC: 2.2 MG/DL (ref 2.6–4.7)
PHOSPHATE SERPL-MCNC: 2.7 MG/DL (ref 2.6–4.7)
PHOSPHATE SERPL-MCNC: 2.8 MG/DL (ref 2.6–4.7)
PHOSPHATE SERPL-MCNC: 3.3 MG/DL (ref 2.6–4.7)
PHOSPHATE SERPL-MCNC: 3.4 MG/DL (ref 2.6–4.7)
PHOSPHATE SERPL-MCNC: 3.5 MG/DL (ref 2.6–4.7)
PHOSPHATE SERPL-MCNC: 3.5 MG/DL (ref 2.6–4.7)
PHOSPHATE SERPL-MCNC: 3.6 MG/DL (ref 2.6–4.7)
PHOSPHATE SERPL-MCNC: 3.7 MG/DL (ref 2.6–4.7)
PHOSPHATE SERPL-MCNC: 3.8 MG/DL (ref 2.6–4.7)
PHOSPHATE SERPL-MCNC: 3.8 MG/DL (ref 2.6–4.7)
PHOSPHATE SERPL-MCNC: 3.9 MG/DL (ref 2.6–4.7)
PHOSPHATE SERPL-MCNC: 3.9 MG/DL (ref 2.6–4.7)
PHOSPHATE SERPL-MCNC: 4.1 MG/DL (ref 2.6–4.7)
PHOSPHATE SERPL-MCNC: 4.2 MG/DL (ref 2.6–4.7)
PHOSPHATE SERPL-MCNC: 4.4 MG/DL (ref 2.6–4.7)
PHOSPHATE SERPL-MCNC: 4.5 MG/DL (ref 2.6–4.7)
PHOSPHATE SERPL-MCNC: 4.5 MG/DL (ref 2.6–4.7)
PHOSPHATE SERPL-MCNC: 4.6 MG/DL (ref 2.6–4.7)
PHOSPHATE SERPL-MCNC: 4.7 MG/DL (ref 2.6–4.7)
PHOSPHATE SERPL-MCNC: 4.7 MG/DL (ref 2.6–4.7)
PHOSPHATE SERPL-MCNC: 4.8 MG/DL (ref 2.6–4.7)
PHOSPHATE SERPL-MCNC: 5 MG/DL (ref 2.6–4.7)
PHOSPHATE SERPL-MCNC: 5.1 MG/DL (ref 2.6–4.7)
PHOSPHATE SERPL-MCNC: 5.8 MG/DL (ref 2.6–4.7)
PHOSPHATE SERPL-MCNC: 6 MG/DL (ref 2.6–4.7)
PLATELET # BLD AUTO: 109 K/UL (ref 150–400)
PLATELET # BLD AUTO: 114 K/UL (ref 150–400)
PLATELET # BLD AUTO: 117 K/UL (ref 150–400)
PLATELET # BLD AUTO: 118 K/UL (ref 150–400)
PLATELET # BLD AUTO: 121 K/UL (ref 150–400)
PLATELET # BLD AUTO: 122 K/UL (ref 150–400)
PLATELET # BLD AUTO: 123 K/UL (ref 150–400)
PLATELET # BLD AUTO: 125 K/UL (ref 150–400)
PLATELET # BLD AUTO: 126 K/UL (ref 150–400)
PLATELET # BLD AUTO: 127 K/UL (ref 150–400)
PLATELET # BLD AUTO: 127 K/UL (ref 150–400)
PLATELET # BLD AUTO: 128 K/UL (ref 150–400)
PLATELET # BLD AUTO: 128 K/UL (ref 150–400)
PLATELET # BLD AUTO: 129 K/UL (ref 150–400)
PLATELET # BLD AUTO: 129 K/UL (ref 150–400)
PLATELET # BLD AUTO: 130 K/UL (ref 150–400)
PLATELET # BLD AUTO: 132 K/UL (ref 150–400)
PLATELET # BLD AUTO: 133 K/UL (ref 150–400)
PLATELET # BLD AUTO: 134 K/UL (ref 150–400)
PLATELET # BLD AUTO: 137 K/UL (ref 150–400)
PLATELET # BLD AUTO: 138 K/UL (ref 150–400)
PLATELET # BLD AUTO: 139 K/UL (ref 150–400)
PLATELET # BLD AUTO: 139 K/UL (ref 150–400)
PLATELET # BLD AUTO: 140 K/UL (ref 150–400)
PLATELET # BLD AUTO: 143 K/UL (ref 150–400)
PLATELET # BLD AUTO: 143 K/UL (ref 150–400)
PLATELET # BLD AUTO: 145 K/UL (ref 150–400)
PLATELET # BLD AUTO: 149 K/UL (ref 150–400)
PLATELET # BLD AUTO: 150 X10E3/UL (ref 150–450)
PLATELET # BLD AUTO: 152 K/UL (ref 150–400)
PLATELET # BLD AUTO: 152 X10E3/UL (ref 150–450)
PLATELET # BLD AUTO: 154 K/UL (ref 150–400)
PLATELET # BLD AUTO: 155 K/UL (ref 150–400)
PLATELET # BLD AUTO: 155 K/UL (ref 150–400)
PLATELET # BLD AUTO: 160 K/UL (ref 150–400)
PLATELET # BLD AUTO: 160 K/UL (ref 150–400)
PLATELET # BLD AUTO: 163 K/UL (ref 150–400)
PLATELET # BLD AUTO: 167 K/UL (ref 150–400)
PLATELET # BLD AUTO: 169 K/UL (ref 150–400)
PLATELET # BLD AUTO: 179 K/UL (ref 150–400)
PLATELET # BLD AUTO: 181 X10E3/UL (ref 150–450)
PLATELET # BLD AUTO: 193 K/UL (ref 150–400)
PLATELET # BLD AUTO: 213 X10E3/UL (ref 150–450)
PLATELET # BLD AUTO: 248 K/UL (ref 150–400)
PLATELET COMMENTS,PCOM: ABNORMAL
PMV BLD AUTO: 8.6 FL (ref 8.9–12.9)
PMV BLD AUTO: 8.7 FL (ref 8.9–12.9)
PMV BLD AUTO: 8.8 FL (ref 8.9–12.9)
PMV BLD AUTO: 8.9 FL (ref 8.9–12.9)
PMV BLD AUTO: 9 FL (ref 8.9–12.9)
PMV BLD AUTO: 9.1 FL (ref 8.9–12.9)
PMV BLD AUTO: 9.1 FL (ref 8.9–12.9)
PMV BLD AUTO: 9.2 FL (ref 8.9–12.9)
PMV BLD AUTO: 9.3 FL (ref 8.9–12.9)
PMV BLD AUTO: 9.4 FL (ref 8.9–12.9)
PMV BLD AUTO: 9.5 FL (ref 8.9–12.9)
PMV BLD AUTO: 9.7 FL (ref 8.9–12.9)
PMV BLD AUTO: 9.8 FL (ref 8.9–12.9)
PMV BLD AUTO: 9.9 FL (ref 8.9–12.9)
PO2 BLD: 17 MMHG (ref 80–100)
PO2 BLD: 49 MMHG (ref 80–100)
PO2 BLD: 60 MMHG (ref 80–100)
PO2 BLD: 62 MMHG (ref 80–100)
PO2 BLD: 63 MMHG (ref 80–100)
PO2 BLD: 64 MMHG (ref 80–100)
PO2 BLD: 65 MMHG (ref 80–100)
PO2 BLD: 72 MMHG (ref 80–100)
PO2 BLD: 74 MMHG (ref 80–100)
PO2 BLD: 76 MMHG (ref 80–100)
PO2 BLD: 84 MMHG (ref 80–100)
PO2 BLD: 90 MMHG (ref 80–100)
PO2 BLDA: 297 MMHG (ref 80–100)
POTASSIUM SERPL-SCNC: 3.2 MMOL/L (ref 3.5–5.1)
POTASSIUM SERPL-SCNC: 3.3 MMOL/L (ref 3.5–5.1)
POTASSIUM SERPL-SCNC: 3.4 MMOL/L (ref 3.5–5.1)
POTASSIUM SERPL-SCNC: 3.4 MMOL/L (ref 3.5–5.2)
POTASSIUM SERPL-SCNC: 3.6 MMOL/L (ref 3.5–5.1)
POTASSIUM SERPL-SCNC: 3.7 MMOL/L (ref 3.5–5.1)
POTASSIUM SERPL-SCNC: 3.7 MMOL/L (ref 3.5–5.1)
POTASSIUM SERPL-SCNC: 3.8 MMOL/L (ref 3.5–5.1)
POTASSIUM SERPL-SCNC: 3.9 MMOL/L (ref 3.5–5.1)
POTASSIUM SERPL-SCNC: 4 MMOL/L (ref 3.5–5.1)
POTASSIUM SERPL-SCNC: 4 MMOL/L (ref 3.5–5.2)
POTASSIUM SERPL-SCNC: 4.1 MMOL/L (ref 3.5–5.1)
POTASSIUM SERPL-SCNC: 4.2 MMOL/L (ref 3.5–5.1)
POTASSIUM SERPL-SCNC: 4.3 MMOL/L (ref 3.5–5.1)
POTASSIUM SERPL-SCNC: 4.4 MMOL/L (ref 3.5–5.1)
POTASSIUM SERPL-SCNC: 4.5 MMOL/L (ref 3.5–5.1)
POTASSIUM SERPL-SCNC: 4.6 MMOL/L (ref 3.5–5.1)
POTASSIUM SERPL-SCNC: 4.6 MMOL/L (ref 3.5–5.1)
POTASSIUM SERPL-SCNC: 4.7 MMOL/L (ref 3.5–5.1)
POTASSIUM SERPL-SCNC: 4.9 MMOL/L (ref 3.5–5.2)
POTASSIUM SERPL-SCNC: 4.9 MMOL/L (ref 3.5–5.2)
POTASSIUM SERPL-SCNC: 5 MMOL/L (ref 3.5–5.1)
POTASSIUM SERPL-SCNC: 5 MMOL/L (ref 3.5–5.2)
PRESSURE SUPPORT SETTING VENT: 15 CMH2O
PRESSURE SUPPORT SETTING VENT: 5 CMH2O
PROCALCITONIN SERPL-MCNC: 0.23 NG/ML
PROCALCITONIN SERPL-MCNC: 0.24 NG/ML
PROCALCITONIN SERPL-MCNC: 0.25 NG/ML
PROCALCITONIN SERPL-MCNC: 0.27 NG/ML
PROCALCITONIN SERPL-MCNC: 0.28 NG/ML
PROCALCITONIN SERPL-MCNC: 0.29 NG/ML
PROCALCITONIN SERPL-MCNC: 0.29 NG/ML
PROCALCITONIN SERPL-MCNC: 0.43 NG/ML
PROCALCITONIN SERPL-MCNC: 0.43 NG/ML
PROCALCITONIN SERPL-MCNC: 0.55 NG/ML
PROCALCITONIN SERPL-MCNC: 0.57 NG/ML
PROCALCITONIN SERPL-MCNC: 0.85 NG/ML
PROCALCITONIN SERPL-MCNC: 0.88 NG/ML
PROCALCITONIN SERPL-MCNC: 0.91 NG/ML
PROCALCITONIN SERPL-MCNC: 1 NG/ML
PROCALCITONIN SERPL-MCNC: 1.08 NG/ML
PROCALCITONIN SERPL-MCNC: 1.55 NG/ML
PROCALCITONIN SERPL-MCNC: 13.21 NG/ML
PROCALCITONIN SERPL-MCNC: 14.94 NG/ML
PROCALCITONIN SERPL-MCNC: 2.21 NG/ML
PROCALCITONIN SERPL-MCNC: 22.72 NG/ML
PROCALCITONIN SERPL-MCNC: 3.56 NG/ML
PROCALCITONIN SERPL-MCNC: 6.19 NG/ML
PROCALCITONIN SERPL-MCNC: 8.89 NG/ML
PROT SERPL-MCNC: 6.6 G/DL (ref 6.4–8.2)
PROT SERPL-MCNC: 6.8 G/DL (ref 6.4–8.2)
PROT SERPL-MCNC: 6.9 G/DL (ref 6.4–8.2)
PROT SERPL-MCNC: 7 G/DL (ref 6–8.5)
PROT SERPL-MCNC: 7.1 G/DL (ref 6.4–8.2)
PROT SERPL-MCNC: 7.2 G/DL (ref 6.4–8.2)
PROT SERPL-MCNC: 7.2 G/DL (ref 6.4–8.2)
PROT SERPL-MCNC: 7.3 G/DL (ref 6.4–8.2)
PROT SERPL-MCNC: 7.3 G/DL (ref 6–8.5)
PROT SERPL-MCNC: 7.3 G/DL (ref 6–8.5)
PROT SERPL-MCNC: 7.4 G/DL (ref 6.4–8.2)
PROT SERPL-MCNC: 7.5 G/DL (ref 6.4–8.2)
PROT SERPL-MCNC: 7.6 G/DL (ref 6.4–8.2)
PROT SERPL-MCNC: 7.6 G/DL (ref 6.4–8.2)
PROT SERPL-MCNC: 7.7 G/DL (ref 6.4–8.2)
PROT SERPL-MCNC: 7.7 G/DL (ref 6.4–8.2)
PROT SERPL-MCNC: 7.8 G/DL (ref 6.4–8.2)
PROT SERPL-MCNC: 7.9 G/DL (ref 6.4–8.2)
PROT SERPL-MCNC: 7.9 G/DL (ref 6–8.5)
PROT SERPL-MCNC: 8 G/DL (ref 6.4–8.2)
PROT SERPL-MCNC: 8.1 G/DL (ref 6.4–8.2)
PROT SERPL-MCNC: 8.1 G/DL (ref 6–8.5)
PROT SERPL-MCNC: 8.6 G/DL (ref 6.4–8.2)
PROT SERPL-MCNC: 9.5 G/DL (ref 6.4–8.2)
PROT UR STRIP-MCNC: 100 MG/DL
PROT UR STRIP-MCNC: 100 MG/DL
PROT UR STRIP-MCNC: 30 MG/DL
PROT UR STRIP-MCNC: 300 MG/DL
PROT UR STRIP-MCNC: ABNORMAL MG/DL
PROTHROMBIN TIME: 13.5 SEC (ref 9–11.1)
PROTHROMBIN TIME: 13.8 SEC (ref 9–11.1)
PROTHROMBIN TIME: 14.5 SEC (ref 9–11.1)
PROTHROMBIN TIME: 14.9 SEC (ref 9–11.1)
PROTHROMBIN TIME: 15.6 SEC (ref 9–11.1)
PROTHROMBIN TIME: 15.8 SEC (ref 9–11.1)
PROTHROMBIN TIME: 16.5 SEC (ref 9–11.1)
PROTHROMBIN TIME: 17.9 SEC (ref 9–11.1)
PROTHROMBIN TIME: 18 SEC (ref 9–11.1)
PROTHROMBIN TIME: 18.1 SEC (ref 9–11.1)
PROTHROMBIN TIME: 18.2 SEC (ref 9.1–12)
PROTHROMBIN TIME: 18.3 SEC (ref 9–11.1)
PROTHROMBIN TIME: 18.7 SEC (ref 9–11.1)
PROTHROMBIN TIME: 18.9 SEC (ref 9–11.1)
PROTHROMBIN TIME: 18.9 SEC (ref 9–11.1)
PROTHROMBIN TIME: 19.1 SEC (ref 9–11.1)
PROTHROMBIN TIME: 19.8 SEC (ref 9–11.1)
PROTHROMBIN TIME: 20.3 SEC (ref 9–11.1)
PROTHROMBIN TIME: 20.4 SEC (ref 9.1–12)
PROTHROMBIN TIME: 21 SEC (ref 9–11.1)
PROTHROMBIN TIME: 21.2 SEC (ref 9–11.1)
PROTHROMBIN TIME: 21.3 SEC (ref 9–11.1)
PROTHROMBIN TIME: 22.4 SEC (ref 9–11.1)
PROTHROMBIN TIME: 22.9 SEC (ref 9–11.1)
PROTHROMBIN TIME: 23.4 SEC (ref 9–11.1)
PROTHROMBIN TIME: 23.5 SEC (ref 9–11.1)
PROTHROMBIN TIME: 24 SEC (ref 9–11.1)
PROTHROMBIN TIME: 24.2 SEC (ref 9–11.1)
PROTHROMBIN TIME: 24.9 SEC (ref 9–11.1)
PROTHROMBIN TIME: 24.9 SEC (ref 9–11.1)
PROTHROMBIN TIME: 25.6 SEC (ref 9–11.1)
PROTHROMBIN TIME: 26.6 SEC (ref 9–11.1)
PROTHROMBIN TIME: 28.5 SEC (ref 9–11.1)
PROTHROMBIN TIME: 29.5 SEC (ref 9–11.1)
PROTHROMBIN TIME: 31 SEC (ref 9–11.1)
PROTHROMBIN TIME: 32 SEC (ref 9–11.1)
PROTHROMBIN TIME: 32.7 SEC (ref 9–11.1)
PROTHROMBIN TIME: 33 SEC (ref 9–11.1)
PROTHROMBIN TIME: 33.5 SEC (ref 9–11.1)
PROTHROMBIN TIME: 33.7 SEC (ref 9.1–12)
PROTHROMBIN TIME: 35.4 SEC (ref 9–11.1)
PROTHROMBIN TIME: 35.6 SEC (ref 9–11.1)
PROTHROMBIN TIME: 37.6 SEC (ref 9–11.1)
PROTHROMBIN TIME: 37.6 SEC (ref 9–11.1)
PROTHROMBIN TIME: 38.2 SEC (ref 9–11.1)
PROTHROMBIN TIME: 38.7 SEC (ref 9–11.1)
PROTHROMBIN TIME: 40.6 SEC (ref 9–11.1)
PROTHROMBIN TIME: 40.9 SEC (ref 9–11.1)
PTT MIXING STUDY,CMS2: ABNORMAL
PTT MIXING STUDY,CMS2: NORMAL
Q-T INTERVAL, ECG07: 260 MS
Q-T INTERVAL, ECG07: 430 MS
Q-T INTERVAL, ECG07: 442 MS
Q-T INTERVAL, ECG07: 464 MS
Q-T INTERVAL, ECG07: 468 MS
Q-T INTERVAL, ECG07: 490 MS
Q-T INTERVAL, ECG07: 506 MS
QRS DURATION, ECG06: 158 MS
QRS DURATION, ECG06: 162 MS
QRS DURATION, ECG06: 180 MS
QRS DURATION, ECG06: 202 MS
QTC CALCULATION (BEZET), ECG08: 396 MS
QTC CALCULATION (BEZET), ECG08: 549 MS
QTC CALCULATION (BEZET), ECG08: 564 MS
QTC CALCULATION (BEZET), ECG08: 595 MS
QTC CALCULATION (BEZET), ECG08: 599 MS
QTC CALCULATION (BEZET), ECG08: 688 MS
QTC CALCULATION (BEZET), ECG08: 726 MS
RBC # BLD AUTO: 2.11 M/UL (ref 3.8–5.2)
RBC # BLD AUTO: 2.13 M/UL (ref 3.8–5.2)
RBC # BLD AUTO: 2.28 M/UL (ref 3.8–5.2)
RBC # BLD AUTO: 2.43 M/UL (ref 3.8–5.2)
RBC # BLD AUTO: 2.63 M/UL (ref 3.8–5.2)
RBC # BLD AUTO: 2.64 M/UL (ref 3.8–5.2)
RBC # BLD AUTO: 2.66 M/UL (ref 3.8–5.2)
RBC # BLD AUTO: 2.85 M/UL (ref 3.8–5.2)
RBC # BLD AUTO: 2.87 M/UL (ref 3.8–5.2)
RBC # BLD AUTO: 2.88 M/UL (ref 3.8–5.2)
RBC # BLD AUTO: 2.88 M/UL (ref 3.8–5.2)
RBC # BLD AUTO: 2.89 M/UL (ref 3.8–5.2)
RBC # BLD AUTO: 2.94 M/UL (ref 3.8–5.2)
RBC # BLD AUTO: 2.97 M/UL (ref 3.8–5.2)
RBC # BLD AUTO: 2.97 M/UL (ref 3.8–5.2)
RBC # BLD AUTO: 2.98 M/UL (ref 3.8–5.2)
RBC # BLD AUTO: 3.02 M/UL (ref 3.8–5.2)
RBC # BLD AUTO: 3.02 M/UL (ref 3.8–5.2)
RBC # BLD AUTO: 3.03 M/UL (ref 3.8–5.2)
RBC # BLD AUTO: 3.08 M/UL (ref 3.8–5.2)
RBC # BLD AUTO: 3.08 M/UL (ref 3.8–5.2)
RBC # BLD AUTO: 3.12 M/UL (ref 3.8–5.2)
RBC # BLD AUTO: 3.13 M/UL (ref 3.8–5.2)
RBC # BLD AUTO: 3.14 M/UL (ref 3.8–5.2)
RBC # BLD AUTO: 3.15 M/UL (ref 3.8–5.2)
RBC # BLD AUTO: 3.18 M/UL (ref 3.8–5.2)
RBC # BLD AUTO: 3.18 X10E6/UL (ref 3.77–5.28)
RBC # BLD AUTO: 3.19 X10E6/UL (ref 3.77–5.28)
RBC # BLD AUTO: 3.2 X10E6/UL (ref 3.77–5.28)
RBC # BLD AUTO: 3.22 M/UL (ref 3.8–5.2)
RBC # BLD AUTO: 3.22 M/UL (ref 3.8–5.2)
RBC # BLD AUTO: 3.24 M/UL (ref 3.8–5.2)
RBC # BLD AUTO: 3.24 M/UL (ref 3.8–5.2)
RBC # BLD AUTO: 3.26 M/UL (ref 3.8–5.2)
RBC # BLD AUTO: 3.29 M/UL (ref 3.8–5.2)
RBC # BLD AUTO: 3.29 M/UL (ref 3.8–5.2)
RBC # BLD AUTO: 3.32 M/UL (ref 3.8–5.2)
RBC # BLD AUTO: 3.35 M/UL (ref 3.8–5.2)
RBC # BLD AUTO: 3.37 M/UL (ref 3.8–5.2)
RBC # BLD AUTO: 3.38 M/UL (ref 3.8–5.2)
RBC # BLD AUTO: 3.45 X10E6/UL (ref 3.77–5.28)
RBC # BLD AUTO: 3.48 M/UL (ref 3.8–5.2)
RBC # BLD AUTO: 3.49 M/UL (ref 3.8–5.2)
RBC # BLD AUTO: 3.89 M/UL (ref 3.8–5.2)
RBC # BLD AUTO: 4.06 M/UL (ref 3.8–5.2)
RBC #/AREA URNS HPF: ABNORMAL /HPF (ref 0–5)
RBC MORPH BLD: ABNORMAL
RETICS # AUTO: 0.05 M/UL (ref 0.02–0.08)
RETICS/RBC NFR AUTO: 1.8 % (ref 0.7–2.1)
RSV RNA SPEC QL NAA+PROBE: NOT DETECTED
RV+EV RNA SPEC QL NAA+PROBE: NOT DETECTED
SAMPLES BEING HELD,HOLD: NORMAL
SAO2 % BLD: 100 % (ref 92–97)
SAO2 % BLD: 24.5 % (ref 92–97)
SAO2 % BLD: 82.3 % (ref 92–97)
SAO2 % BLD: 91 % (ref 92–97)
SAO2 % BLD: 92 % (ref 92–97)
SAO2 % BLD: 94 % (ref 92–97)
SAO2 % BLD: 94 % (ref 92–97)
SAO2 % BLD: 94.1 % (ref 92–97)
SAO2 % BLD: 95 % (ref 92–97)
SAO2 % BLD: 96 % (ref 92–97)
SAO2 % BLD: 96 % (ref 92–97)
SAO2% DEVICE SAO2% SENSOR NAME: ABNORMAL
SARS-COV-2 PCR, COVPCR: NOT DETECTED
SARS-COV-2, COV2: NORMAL
SARS-COV-2, COV2: NORMAL
SARS-COV-2, XPLCVT: NOT DETECTED
SERVICE CMNT-IMP: ABNORMAL
SERVICE CMNT-IMP: NORMAL
SODIUM SERPL-SCNC: 133 MMOL/L (ref 136–145)
SODIUM SERPL-SCNC: 134 MMOL/L (ref 136–145)
SODIUM SERPL-SCNC: 135 MMOL/L (ref 134–144)
SODIUM SERPL-SCNC: 135 MMOL/L (ref 136–145)
SODIUM SERPL-SCNC: 136 MMOL/L (ref 136–145)
SODIUM SERPL-SCNC: 137 MMOL/L (ref 136–145)
SODIUM SERPL-SCNC: 138 MMOL/L (ref 134–144)
SODIUM SERPL-SCNC: 138 MMOL/L (ref 136–145)
SODIUM SERPL-SCNC: 139 MMOL/L (ref 136–145)
SODIUM SERPL-SCNC: 140 MMOL/L (ref 134–144)
SODIUM SERPL-SCNC: 140 MMOL/L (ref 134–144)
SODIUM SERPL-SCNC: 140 MMOL/L (ref 136–145)
SODIUM SERPL-SCNC: 140 MMOL/L (ref 136–145)
SODIUM SERPL-SCNC: 142 MMOL/L (ref 134–144)
SODIUM UR-SCNC: 41 MMOL/L
SOURCE, COVRS: NORMAL
SP GR UR REFRACTOMETRY: 1.01 (ref 1–1.03)
SP GR UR REFRACTOMETRY: 1.01 (ref 1–1.03)
SP GR UR REFRACTOMETRY: 1.02 (ref 1–1.03)
SP GR UR REFRACTOMETRY: 1.02 (ref 1–1.03)
SP GR UR REFRACTOMETRY: 1.03 (ref 1–1.03)
SP1: NORMAL
SP2: NORMAL
SP3: NORMAL
SPECIMEN EXP DATE BLD: NORMAL
SPECIMEN SITE: ABNORMAL
SPECIMEN SOURCE: NORMAL
SPECIMEN STATUS REPORT, ROLRST: NORMAL
SPECIMEN TYPE: ABNORMAL
STATUS OF UNIT,%ST: NORMAL
STRESS TARGET HR: 152 BPM
T4 FREE SERPL-MCNC: 1.1 NG/DL (ref 0.8–1.5)
T4 FREE SERPL-MCNC: 1.2 NG/DL (ref 0.8–1.5)
T4 FREE SERPL-MCNC: 1.3 NG/DL (ref 0.8–1.5)
THERAPEUTIC RANGE,PTTT: ABNORMAL SECS (ref 58–77)
TIBC SERPL-MCNC: 129 UG/DL (ref 250–450)
TIBC SERPL-MCNC: 213 UG/DL (ref 250–450)
TIBC SERPL-MCNC: 246 UG/DL (ref 250–450)
TIBC SERPL-MCNC: 252 UG/DL (ref 250–450)
TOTAL RESP. RATE, ITRR: 23
TRIGL SERPL-MCNC: 110 MG/DL (ref ?–150)
TROPONIN I SERPL-MCNC: 0.09 NG/ML
TSH SERPL DL<=0.05 MIU/L-ACNC: 1.6 UIU/ML (ref 0.36–3.74)
TSH SERPL DL<=0.05 MIU/L-ACNC: 3.99 UIU/ML (ref 0.36–3.74)
TSH SERPL DL<=0.05 MIU/L-ACNC: 5.24 UIU/ML (ref 0.36–3.74)
TSH SERPL DL<=0.05 MIU/L-ACNC: 5.36 UIU/ML (ref 0.36–3.74)
UA: UC IF INDICATED,UAUC: ABNORMAL
UNIT DIVISION, %UDIV: 0
UR CULT HOLD, URHOLD: NORMAL
UR CULT HOLD, URHOLD: NORMAL
URATE SERPL-MCNC: 10.9 MG/DL (ref 2.6–6)
URATE SERPL-MCNC: 4.6 MG/DL (ref 2.6–6)
UROBILINOGEN UR QL STRIP.AUTO: 0.2 EU/DL (ref 0.2–1)
VANCOMYCIN SERPL-MCNC: 18 UG/ML
VANCOMYCIN SERPL-MCNC: 19.4 UG/ML
VENTILATION MODE VENT: ABNORMAL
VENTRICULAR RATE, ECG03: 117 BPM
VENTRICULAR RATE, ECG03: 130 BPM
VENTRICULAR RATE, ECG03: 132 BPM
VENTRICULAR RATE, ECG03: 140 BPM
VENTRICULAR RATE, ECG03: 71 BPM
VENTRICULAR RATE, ECG03: 98 BPM
VENTRICULAR RATE, ECG03: 99 BPM
VLDLC SERPL CALC-MCNC: 22 MG/DL
VT SETTING VENT: 450 ML
VT SETTING VENT: 450 ML
VT SETTING VENT: 516 ML
WBC # BLD AUTO: 1.8 X10E3/UL (ref 3.4–10.8)
WBC # BLD AUTO: 1.9 K/UL (ref 3.6–11)
WBC # BLD AUTO: 11.4 K/UL (ref 3.6–11)
WBC # BLD AUTO: 2.6 X10E3/UL (ref 3.4–10.8)
WBC # BLD AUTO: 2.7 K/UL (ref 3.6–11)
WBC # BLD AUTO: 2.7 K/UL (ref 3.6–11)
WBC # BLD AUTO: 2.8 K/UL (ref 3.6–11)
WBC # BLD AUTO: 2.8 K/UL (ref 3.6–11)
WBC # BLD AUTO: 2.9 K/UL (ref 3.6–11)
WBC # BLD AUTO: 3 K/UL (ref 3.6–11)
WBC # BLD AUTO: 3.1 K/UL (ref 3.6–11)
WBC # BLD AUTO: 3.2 K/UL (ref 3.6–11)
WBC # BLD AUTO: 3.3 K/UL (ref 3.6–11)
WBC # BLD AUTO: 3.4 K/UL (ref 3.6–11)
WBC # BLD AUTO: 3.4 K/UL (ref 3.6–11)
WBC # BLD AUTO: 3.5 K/UL (ref 3.6–11)
WBC # BLD AUTO: 3.6 K/UL (ref 3.6–11)
WBC # BLD AUTO: 3.6 K/UL (ref 3.6–11)
WBC # BLD AUTO: 3.7 K/UL (ref 3.6–11)
WBC # BLD AUTO: 4.1 K/UL (ref 3.6–11)
WBC # BLD AUTO: 4.1 K/UL (ref 3.6–11)
WBC # BLD AUTO: 4.2 K/UL (ref 3.6–11)
WBC # BLD AUTO: 4.2 K/UL (ref 3.6–11)
WBC # BLD AUTO: 4.5 K/UL (ref 3.6–11)
WBC # BLD AUTO: 4.6 K/UL (ref 3.6–11)
WBC # BLD AUTO: 4.7 K/UL (ref 3.6–11)
WBC # BLD AUTO: 4.8 K/UL (ref 3.6–11)
WBC # BLD AUTO: 4.9 K/UL (ref 3.6–11)
WBC # BLD AUTO: 4.9 X10E3/UL (ref 3.4–10.8)
WBC # BLD AUTO: 5.4 K/UL (ref 3.6–11)
WBC # BLD AUTO: 6.5 X10E3/UL (ref 3.4–10.8)
WBC # BLD AUTO: 7.4 K/UL (ref 3.6–11)
WBC # BLD AUTO: 7.8 K/UL (ref 3.6–11)
WBC # BLD AUTO: 8 K/UL (ref 3.6–11)
WBC # BLD AUTO: 8.5 K/UL (ref 3.6–11)
WBC # BLD AUTO: 9 K/UL (ref 3.6–11)
WBC URNS QL MICRO: >100 /HPF (ref 0–4)
WBC URNS QL MICRO: ABNORMAL /HPF (ref 0–4)
YEAST BUDDING URNS QL: PRESENT
YEAST URNS QL MICRO: PRESENT
YEAST URNS QL MICRO: PRESENT

## 2021-01-01 PROCEDURE — 86870 RBC ANTIBODY IDENTIFICATION: CPT

## 2021-01-01 PROCEDURE — 83605 ASSAY OF LACTIC ACID: CPT

## 2021-01-01 PROCEDURE — 74011636637 HC RX REV CODE- 636/637: Performed by: NURSE PRACTITIONER

## 2021-01-01 PROCEDURE — 80053 COMPREHEN METABOLIC PANEL: CPT

## 2021-01-01 PROCEDURE — 99231 SBSQ HOSP IP/OBS SF/LOW 25: CPT | Performed by: THORACIC SURGERY (CARDIOTHORACIC VASCULAR SURGERY)

## 2021-01-01 PROCEDURE — 74011250636 HC RX REV CODE- 250/636: Performed by: NURSE PRACTITIONER

## 2021-01-01 PROCEDURE — 83735 ASSAY OF MAGNESIUM: CPT

## 2021-01-01 PROCEDURE — G0156 HHCP-SVS OF AIDE,EA 15 MIN: HCPCS

## 2021-01-01 PROCEDURE — 74011250636 HC RX REV CODE- 250/636: Performed by: INTERNAL MEDICINE

## 2021-01-01 PROCEDURE — 84100 ASSAY OF PHOSPHORUS: CPT

## 2021-01-01 PROCEDURE — 77030019905 HC CATH URETH INTMIT MDII -A

## 2021-01-01 PROCEDURE — 85610 PROTHROMBIN TIME: CPT

## 2021-01-01 PROCEDURE — 99233 SBSQ HOSP IP/OBS HIGH 50: CPT | Performed by: NURSE PRACTITIONER

## 2021-01-01 PROCEDURE — 94640 AIRWAY INHALATION TREATMENT: CPT

## 2021-01-01 PROCEDURE — 74011250637 HC RX REV CODE- 250/637: Performed by: INTERNAL MEDICINE

## 2021-01-01 PROCEDURE — G8427 DOCREV CUR MEDS BY ELIG CLIN: HCPCS | Performed by: NURSE PRACTITIONER

## 2021-01-01 PROCEDURE — 80202 ASSAY OF VANCOMYCIN: CPT

## 2021-01-01 PROCEDURE — 82140 ASSAY OF AMMONIA: CPT

## 2021-01-01 PROCEDURE — 74011000258 HC RX REV CODE- 258: Performed by: NURSE PRACTITIONER

## 2021-01-01 PROCEDURE — 77030037442 HC TY LVAD MGMT SYST CMP-B

## 2021-01-01 PROCEDURE — 36415 COLL VENOUS BLD VENIPUNCTURE: CPT

## 2021-01-01 PROCEDURE — 65610000003 HC RM ICU SURGICAL

## 2021-01-01 PROCEDURE — 0651 HSPC ROUTINE HOME CARE

## 2021-01-01 PROCEDURE — 3017F COLORECTAL CA SCREEN DOC REV: CPT | Performed by: NURSE PRACTITIONER

## 2021-01-01 PROCEDURE — 94664 DEMO&/EVAL PT USE INHALER: CPT

## 2021-01-01 PROCEDURE — 82550 ASSAY OF CK (CPK): CPT

## 2021-01-01 PROCEDURE — 36591 DRAW BLOOD OFF VENOUS DEVICE: CPT

## 2021-01-01 PROCEDURE — 86706 HEP B SURFACE ANTIBODY: CPT

## 2021-01-01 PROCEDURE — 83615 LACTATE (LD) (LDH) ENZYME: CPT

## 2021-01-01 PROCEDURE — 74011250636 HC RX REV CODE- 250/636: Performed by: ANESTHESIOLOGY

## 2021-01-01 PROCEDURE — 85025 COMPLETE CBC W/AUTO DIFF WBC: CPT

## 2021-01-01 PROCEDURE — 82728 ASSAY OF FERRITIN: CPT

## 2021-01-01 PROCEDURE — C1894 INTRO/SHEATH, NON-LASER: HCPCS | Performed by: INTERNAL MEDICINE

## 2021-01-01 PROCEDURE — 82803 BLOOD GASES ANY COMBINATION: CPT

## 2021-01-01 PROCEDURE — 77030038269 HC DRN EXT URIN PURWCK BARD -A

## 2021-01-01 PROCEDURE — 84145 PROCALCITONIN (PCT): CPT

## 2021-01-01 PROCEDURE — 36430 TRANSFUSION BLD/BLD COMPNT: CPT

## 2021-01-01 PROCEDURE — 77010033678 HC OXYGEN DAILY

## 2021-01-01 PROCEDURE — 83880 ASSAY OF NATRIURETIC PEPTIDE: CPT

## 2021-01-01 PROCEDURE — 77030013797 HC KT TRNSDUC PRSSR EDWD -A

## 2021-01-01 PROCEDURE — 36600 WITHDRAWAL OF ARTERIAL BLOOD: CPT

## 2021-01-01 PROCEDURE — 74011250636 HC RX REV CODE- 250/636: Performed by: HOSPITALIST

## 2021-01-01 PROCEDURE — P9047 ALBUMIN (HUMAN), 25%, 50ML: HCPCS | Performed by: INTERNAL MEDICINE

## 2021-01-01 PROCEDURE — 80069 RENAL FUNCTION PANEL: CPT

## 2021-01-01 PROCEDURE — 86880 COOMBS TEST DIRECT: CPT

## 2021-01-01 PROCEDURE — 99233 SBSQ HOSP IP/OBS HIGH 50: CPT | Performed by: INTERNAL MEDICINE

## 2021-01-01 PROCEDURE — C9113 INJ PANTOPRAZOLE SODIUM, VIA: HCPCS | Performed by: HOSPITALIST

## 2021-01-01 PROCEDURE — G8417 CALC BMI ABV UP PARAM F/U: HCPCS | Performed by: NURSE PRACTITIONER

## 2021-01-01 PROCEDURE — G8536 NO DOC ELDER MAL SCRN: HCPCS | Performed by: NURSE PRACTITIONER

## 2021-01-01 PROCEDURE — 97530 THERAPEUTIC ACTIVITIES: CPT

## 2021-01-01 PROCEDURE — 65660000001 HC RM ICU INTERMED STEPDOWN

## 2021-01-01 PROCEDURE — G8427 DOCREV CUR MEDS BY ELIG CLIN: HCPCS | Performed by: INTERNAL MEDICINE

## 2021-01-01 PROCEDURE — 82962 GLUCOSE BLOOD TEST: CPT

## 2021-01-01 PROCEDURE — 74011250637 HC RX REV CODE- 250/637: Performed by: NURSE PRACTITIONER

## 2021-01-01 PROCEDURE — 93005 ELECTROCARDIOGRAM TRACING: CPT

## 2021-01-01 PROCEDURE — 97535 SELF CARE MNGMENT TRAINING: CPT

## 2021-01-01 PROCEDURE — 84439 ASSAY OF FREE THYROXINE: CPT

## 2021-01-01 PROCEDURE — 93750 INTERROGATION VAD IN PERSON: CPT | Performed by: NURSE PRACTITIONER

## 2021-01-01 PROCEDURE — 74011000250 HC RX REV CODE- 250: Performed by: INTERNAL MEDICINE

## 2021-01-01 PROCEDURE — 77030010507 HC ADH SKN DERMBND J&J -B

## 2021-01-01 PROCEDURE — 85027 COMPLETE CBC AUTOMATED: CPT

## 2021-01-01 PROCEDURE — 74011000250 HC RX REV CODE- 250: Performed by: NURSE PRACTITIONER

## 2021-01-01 PROCEDURE — 99223 1ST HOSP IP/OBS HIGH 75: CPT | Performed by: INTERNAL MEDICINE

## 2021-01-01 PROCEDURE — 77030037878 HC DRSG MEPILEX >48IN BORD MOLN -B

## 2021-01-01 PROCEDURE — 93971 EXTREMITY STUDY: CPT

## 2021-01-01 PROCEDURE — 93750 INTERROGATION VAD IN PERSON: CPT | Performed by: INTERNAL MEDICINE

## 2021-01-01 PROCEDURE — 99222 1ST HOSP IP/OBS MODERATE 55: CPT | Performed by: INTERNAL MEDICINE

## 2021-01-01 PROCEDURE — 93750 INTERROGATION VAD IN PERSON: CPT | Performed by: THORACIC SURGERY (CARDIOTHORACIC VASCULAR SURGERY)

## 2021-01-01 PROCEDURE — P9045 ALBUMIN (HUMAN), 5%, 250 ML: HCPCS | Performed by: NURSE PRACTITIONER

## 2021-01-01 PROCEDURE — 93325 DOPPLER ECHO COLOR FLOW MAPG: CPT | Performed by: SPECIALIST

## 2021-01-01 PROCEDURE — 99231 SBSQ HOSP IP/OBS SF/LOW 25: CPT | Performed by: INTERNAL MEDICINE

## 2021-01-01 PROCEDURE — 77030029065 HC DRSG HEMO QCLOT ZMED -B

## 2021-01-01 PROCEDURE — 99233 SBSQ HOSP IP/OBS HIGH 50: CPT | Performed by: PSYCHIATRY & NEUROLOGY

## 2021-01-01 PROCEDURE — 71045 X-RAY EXAM CHEST 1 VIEW: CPT

## 2021-01-01 PROCEDURE — 74011250636 HC RX REV CODE- 250/636: Performed by: EMERGENCY MEDICINE

## 2021-01-01 PROCEDURE — 97166 OT EVAL MOD COMPLEX 45 MIN: CPT

## 2021-01-01 PROCEDURE — 1101F PT FALLS ASSESS-DOCD LE1/YR: CPT | Performed by: NURSE PRACTITIONER

## 2021-01-01 PROCEDURE — 87106 FUNGI IDENTIFICATION YEAST: CPT

## 2021-01-01 PROCEDURE — 74011250636 HC RX REV CODE- 250/636: Performed by: STUDENT IN AN ORGANIZED HEALTH CARE EDUCATION/TRAINING PROGRAM

## 2021-01-01 PROCEDURE — 05HM33Z INSERTION OF INFUSION DEVICE INTO RIGHT INTERNAL JUGULAR VEIN, PERCUTANEOUS APPROACH: ICD-10-PCS | Performed by: ANESTHESIOLOGY

## 2021-01-01 PROCEDURE — 90935 HEMODIALYSIS ONE EVALUATION: CPT

## 2021-01-01 PROCEDURE — 74011000258 HC RX REV CODE- 258: Performed by: STUDENT IN AN ORGANIZED HEALTH CARE EDUCATION/TRAINING PROGRAM

## 2021-01-01 PROCEDURE — 84484 ASSAY OF TROPONIN QUANT: CPT

## 2021-01-01 PROCEDURE — 74011000250 HC RX REV CODE- 250: Performed by: FAMILY MEDICINE

## 2021-01-01 PROCEDURE — 86922 COMPATIBILITY TEST ANTIGLOB: CPT

## 2021-01-01 PROCEDURE — 87086 URINE CULTURE/COLONY COUNT: CPT

## 2021-01-01 PROCEDURE — 93321 DOPPLER ECHO F-UP/LMTD STD: CPT

## 2021-01-01 PROCEDURE — 99232 SBSQ HOSP IP/OBS MODERATE 35: CPT | Performed by: INTERNAL MEDICINE

## 2021-01-01 PROCEDURE — 81001 URINALYSIS AUTO W/SCOPE: CPT

## 2021-01-01 PROCEDURE — 80048 BASIC METABOLIC PNL TOTAL CA: CPT

## 2021-01-01 PROCEDURE — 1101F PT FALLS ASSESS-DOCD LE1/YR: CPT | Performed by: INTERNAL MEDICINE

## 2021-01-01 PROCEDURE — HOSPICE MEDICATION HC HH HOSPICE MEDICATION

## 2021-01-01 PROCEDURE — 74011000250 HC RX REV CODE- 250: Performed by: RADIOLOGY

## 2021-01-01 PROCEDURE — 83036 HEMOGLOBIN GLYCOSYLATED A1C: CPT

## 2021-01-01 PROCEDURE — 70450 CT HEAD/BRAIN W/O DYE: CPT

## 2021-01-01 PROCEDURE — 74011636637 HC RX REV CODE- 636/637: Performed by: HOSPITALIST

## 2021-01-01 PROCEDURE — 74011250636 HC RX REV CODE- 250/636

## 2021-01-01 PROCEDURE — 85730 THROMBOPLASTIN TIME PARTIAL: CPT

## 2021-01-01 PROCEDURE — 77030013406 HC CATH CTRL EDWD -B: Performed by: INTERNAL MEDICINE

## 2021-01-01 PROCEDURE — 83540 ASSAY OF IRON: CPT

## 2021-01-01 PROCEDURE — 95714 VEEG EA 12-26 HR UNMNTR: CPT | Performed by: PSYCHIATRY & NEUROLOGY

## 2021-01-01 PROCEDURE — 77030014008 HC SPNG HEMSTAT J&J -C

## 2021-01-01 PROCEDURE — P9047 ALBUMIN (HUMAN), 25%, 50ML: HCPCS | Performed by: FAMILY MEDICINE

## 2021-01-01 PROCEDURE — G8400 PT W/DXA NO RESULTS DOC: HCPCS | Performed by: NURSE PRACTITIONER

## 2021-01-01 PROCEDURE — G0299 HHS/HOSPICE OF RN EA 15 MIN: HCPCS

## 2021-01-01 PROCEDURE — 93306 TTE W/DOPPLER COMPLETE: CPT

## 2021-01-01 PROCEDURE — 94760 N-INVAS EAR/PLS OXIMETRY 1: CPT

## 2021-01-01 PROCEDURE — 2709999900 HC NON-CHARGEABLE SUPPLY

## 2021-01-01 PROCEDURE — 74011000636 HC RX REV CODE- 636: Performed by: INTERNAL MEDICINE

## 2021-01-01 PROCEDURE — 02HV33Z INSERTION OF INFUSION DEVICE INTO SUPERIOR VENA CAVA, PERCUTANEOUS APPROACH: ICD-10-PCS | Performed by: RADIOLOGY

## 2021-01-01 PROCEDURE — P9016 RBC LEUKOCYTES REDUCED: HCPCS

## 2021-01-01 PROCEDURE — 76705 ECHO EXAM OF ABDOMEN: CPT

## 2021-01-01 PROCEDURE — 77030022017 HC DRSG HEMO QCLOT ZMED -A

## 2021-01-01 PROCEDURE — 02HV33Z INSERTION OF INFUSION DEVICE INTO SUPERIOR VENA CAVA, PERCUTANEOUS APPROACH: ICD-10-PCS | Performed by: STUDENT IN AN ORGANIZED HEALTH CARE EDUCATION/TRAINING PROGRAM

## 2021-01-01 PROCEDURE — 86920 COMPATIBILITY TEST SPIN: CPT

## 2021-01-01 PROCEDURE — 86921 COMPATIBILITY TEST INCUBATE: CPT

## 2021-01-01 PROCEDURE — 74011250637 HC RX REV CODE- 250/637: Performed by: FAMILY MEDICINE

## 2021-01-01 PROCEDURE — 97116 GAIT TRAINING THERAPY: CPT

## 2021-01-01 PROCEDURE — 80051 ELECTROLYTE PANEL: CPT

## 2021-01-01 PROCEDURE — 99232 SBSQ HOSP IP/OBS MODERATE 35: CPT | Performed by: PSYCHIATRY & NEUROLOGY

## 2021-01-01 PROCEDURE — 87040 BLOOD CULTURE FOR BACTERIA: CPT

## 2021-01-01 PROCEDURE — 94660 CPAP INITIATION&MGMT: CPT

## 2021-01-01 PROCEDURE — C1769 GUIDE WIRE: HCPCS

## 2021-01-01 PROCEDURE — 77030002996 HC SUT SLK J&J -A

## 2021-01-01 PROCEDURE — 74011000250 HC RX REV CODE- 250: Performed by: STUDENT IN AN ORGANIZED HEALTH CARE EDUCATION/TRAINING PROGRAM

## 2021-01-01 PROCEDURE — 77030013797 HC KT TRNSDUC PRSSR EDWD -A: Performed by: INTERNAL MEDICINE

## 2021-01-01 PROCEDURE — 74011000250 HC RX REV CODE- 250: Performed by: HOSPITALIST

## 2021-01-01 PROCEDURE — 93451 RIGHT HEART CATH: CPT | Performed by: INTERNAL MEDICINE

## 2021-01-01 PROCEDURE — 93308 TTE F-UP OR LMTD: CPT | Performed by: SPECIALIST

## 2021-01-01 PROCEDURE — 74011000258 HC RX REV CODE- 258: Performed by: INTERNAL MEDICINE

## 2021-01-01 PROCEDURE — 0202U NFCT DS 22 TRGT SARS-COV-2: CPT

## 2021-01-01 PROCEDURE — 86900 BLOOD TYPING SEROLOGIC ABO: CPT

## 2021-01-01 PROCEDURE — 51798 US URINE CAPACITY MEASURE: CPT

## 2021-01-01 PROCEDURE — 99215 OFFICE O/P EST HI 40 MIN: CPT | Performed by: NURSE PRACTITIONER

## 2021-01-01 PROCEDURE — P9045 ALBUMIN (HUMAN), 5%, 250 ML: HCPCS | Performed by: HOSPITALIST

## 2021-01-01 PROCEDURE — 84300 ASSAY OF URINE SODIUM: CPT

## 2021-01-01 PROCEDURE — 99291 CRITICAL CARE FIRST HOUR: CPT

## 2021-01-01 PROCEDURE — 84443 ASSAY THYROID STIM HORMONE: CPT

## 2021-01-01 PROCEDURE — 93503 INSERT/PLACE HEART CATHETER: CPT | Performed by: INTERNAL MEDICINE

## 2021-01-01 PROCEDURE — C1750 CATH, HEMODIALYSIS,LONG-TERM: HCPCS

## 2021-01-01 PROCEDURE — 80074 ACUTE HEPATITIS PANEL: CPT

## 2021-01-01 PROCEDURE — 5A1945Z RESPIRATORY VENTILATION, 24-96 CONSECUTIVE HOURS: ICD-10-PCS | Performed by: EMERGENCY MEDICINE

## 2021-01-01 PROCEDURE — G8400 PT W/DXA NO RESULTS DOC: HCPCS | Performed by: INTERNAL MEDICINE

## 2021-01-01 PROCEDURE — 87635 SARS-COV-2 COVID-19 AMP PRB: CPT

## 2021-01-01 PROCEDURE — 93308 TTE F-UP OR LMTD: CPT

## 2021-01-01 PROCEDURE — 99292 CRITICAL CARE ADDL 30 MIN: CPT | Performed by: INTERNAL MEDICINE

## 2021-01-01 PROCEDURE — 92610 EVALUATE SWALLOWING FUNCTION: CPT

## 2021-01-01 PROCEDURE — 5A09357 ASSISTANCE WITH RESPIRATORY VENTILATION, LESS THAN 24 CONSECUTIVE HOURS, CONTINUOUS POSITIVE AIRWAY PRESSURE: ICD-10-PCS | Performed by: INTERNAL MEDICINE

## 2021-01-01 PROCEDURE — 99231 SBSQ HOSP IP/OBS SF/LOW 25: CPT | Performed by: NURSE PRACTITIONER

## 2021-01-01 PROCEDURE — 1090F PRES/ABSN URINE INCON ASSESS: CPT | Performed by: NURSE PRACTITIONER

## 2021-01-01 PROCEDURE — 5A1D70Z PERFORMANCE OF URINARY FILTRATION, INTERMITTENT, LESS THAN 6 HOURS PER DAY: ICD-10-PCS | Performed by: INTERNAL MEDICINE

## 2021-01-01 PROCEDURE — 82668 ASSAY OF ERYTHROPOIETIN: CPT

## 2021-01-01 PROCEDURE — 74011250637 HC RX REV CODE- 250/637: Performed by: STUDENT IN AN ORGANIZED HEALTH CARE EDUCATION/TRAINING PROGRAM

## 2021-01-01 PROCEDURE — 86850 RBC ANTIBODY SCREEN: CPT

## 2021-01-01 PROCEDURE — 74011000250 HC RX REV CODE- 250: Performed by: ANESTHESIOLOGY

## 2021-01-01 PROCEDURE — 94003 VENT MGMT INPAT SUBQ DAY: CPT

## 2021-01-01 PROCEDURE — 74011250636 HC RX REV CODE- 250/636: Performed by: SURGERY

## 2021-01-01 PROCEDURE — 3336500001 HSPC ELECTION

## 2021-01-01 PROCEDURE — 99214 OFFICE O/P EST MOD 30 MIN: CPT | Performed by: NURSE PRACTITIONER

## 2021-01-01 PROCEDURE — 74011250636 HC RX REV CODE- 250/636: Performed by: RADIOLOGY

## 2021-01-01 PROCEDURE — 97161 PT EVAL LOW COMPLEX 20 MIN: CPT

## 2021-01-01 PROCEDURE — G8417 CALC BMI ABV UP PARAM F/U: HCPCS | Performed by: INTERNAL MEDICINE

## 2021-01-01 PROCEDURE — 74011250637 HC RX REV CODE- 250/637: Performed by: HOSPITALIST

## 2021-01-01 PROCEDURE — 85045 AUTOMATED RETICULOCYTE COUNT: CPT

## 2021-01-01 PROCEDURE — 74011250637 HC RX REV CODE- 250/637

## 2021-01-01 PROCEDURE — 65660000000 HC RM CCU STEPDOWN

## 2021-01-01 PROCEDURE — 87205 SMEAR GRAM STAIN: CPT

## 2021-01-01 PROCEDURE — 95816 EEG AWAKE AND DROWSY: CPT | Performed by: NURSE PRACTITIONER

## 2021-01-01 PROCEDURE — 86978 RBC PRETREATMENT SERUM: CPT

## 2021-01-01 PROCEDURE — G8432 DEP SCR NOT DOC, RNG: HCPCS | Performed by: INTERNAL MEDICINE

## 2021-01-01 PROCEDURE — 84132 ASSAY OF SERUM POTASSIUM: CPT

## 2021-01-01 PROCEDURE — 99214 OFFICE O/P EST MOD 30 MIN: CPT | Performed by: INTERNAL MEDICINE

## 2021-01-01 PROCEDURE — 77030011746 HC SEAL HEMSTAT TELE -B

## 2021-01-01 PROCEDURE — C1751 CATH, INF, PER/CENT/MIDLINE: HCPCS | Performed by: INTERNAL MEDICINE

## 2021-01-01 PROCEDURE — 36558 INSERT TUNNELED CV CATH: CPT

## 2021-01-01 PROCEDURE — P9045 ALBUMIN (HUMAN), 5%, 250 ML: HCPCS

## 2021-01-01 PROCEDURE — G0463 HOSPITAL OUTPT CLINIC VISIT: HCPCS | Performed by: INTERNAL MEDICINE

## 2021-01-01 PROCEDURE — G8432 DEP SCR NOT DOC, RNG: HCPCS | Performed by: NURSE PRACTITIONER

## 2021-01-01 PROCEDURE — 82043 UR ALBUMIN QUANTITATIVE: CPT

## 2021-01-01 PROCEDURE — 86901 BLOOD TYPING SEROLOGIC RH(D): CPT

## 2021-01-01 PROCEDURE — 74011000258 HC RX REV CODE- 258: Performed by: ANESTHESIOLOGY

## 2021-01-01 PROCEDURE — 82272 OCCULT BLD FECES 1-3 TESTS: CPT

## 2021-01-01 PROCEDURE — 77030040830 HC CATH URETH FOL MDII -A

## 2021-01-01 PROCEDURE — 74011000250 HC RX REV CODE- 250: Performed by: PSYCHIATRY & NEUROLOGY

## 2021-01-01 PROCEDURE — G0155 HHCP-SVS OF CSW,EA 15 MIN: HCPCS

## 2021-01-01 PROCEDURE — 85018 HEMOGLOBIN: CPT

## 2021-01-01 PROCEDURE — 85732 THROMBOPLASTIN TIME PARTIAL: CPT

## 2021-01-01 PROCEDURE — 94761 N-INVAS EAR/PLS OXIMETRY MLT: CPT

## 2021-01-01 PROCEDURE — 99291 CRITICAL CARE FIRST HOUR: CPT | Performed by: THORACIC SURGERY (CARDIOTHORACIC VASCULAR SURGERY)

## 2021-01-01 PROCEDURE — 74018 RADEX ABDOMEN 1 VIEW: CPT

## 2021-01-01 PROCEDURE — 87186 SC STD MICRODIL/AGAR DIL: CPT

## 2021-01-01 PROCEDURE — 99232 SBSQ HOSP IP/OBS MODERATE 35: CPT | Performed by: NURSE PRACTITIONER

## 2021-01-01 PROCEDURE — 82533 TOTAL CORTISOL: CPT

## 2021-01-01 PROCEDURE — 65270000029 HC RM PRIVATE

## 2021-01-01 PROCEDURE — 74011250636 HC RX REV CODE- 250/636: Performed by: FAMILY MEDICINE

## 2021-01-01 PROCEDURE — 96374 THER/PROPH/DIAG INJ IV PUSH: CPT

## 2021-01-01 PROCEDURE — 99000 SPECIMEN HANDLING OFFICE-LAB: CPT | Performed by: NURSE PRACTITIONER

## 2021-01-01 PROCEDURE — C1751 CATH, INF, PER/CENT/MIDLINE: HCPCS

## 2021-01-01 PROCEDURE — 99233 SBSQ HOSP IP/OBS HIGH 50: CPT | Performed by: THORACIC SURGERY (CARDIOTHORACIC VASCULAR SURGERY)

## 2021-01-01 PROCEDURE — 87340 HEPATITIS B SURFACE AG IA: CPT

## 2021-01-01 PROCEDURE — 86902 BLOOD TYPE ANTIGEN DONOR EA: CPT

## 2021-01-01 PROCEDURE — 74011636637 HC RX REV CODE- 636/637: Performed by: INTERNAL MEDICINE

## 2021-01-01 PROCEDURE — 99284 EMERGENCY DEPT VISIT MOD MDM: CPT

## 2021-01-01 PROCEDURE — G0463 HOSPITAL OUTPT CLINIC VISIT: HCPCS | Performed by: NURSE PRACTITIONER

## 2021-01-01 PROCEDURE — C1769 GUIDE WIRE: HCPCS | Performed by: INTERNAL MEDICINE

## 2021-01-01 PROCEDURE — 80061 LIPID PANEL: CPT

## 2021-01-01 PROCEDURE — 77030013744: Performed by: INTERNAL MEDICINE

## 2021-01-01 PROCEDURE — 99223 1ST HOSP IP/OBS HIGH 75: CPT | Performed by: PSYCHIATRY & NEUROLOGY

## 2021-01-01 PROCEDURE — 96375 TX/PRO/DX INJ NEW DRUG ADDON: CPT

## 2021-01-01 PROCEDURE — 93306 TTE W/DOPPLER COMPLETE: CPT | Performed by: SPECIALIST

## 2021-01-01 PROCEDURE — 93284 PRGRMG EVAL IMPLANTABLE DFB: CPT | Performed by: INTERNAL MEDICINE

## 2021-01-01 PROCEDURE — 4A023N6 MEASUREMENT OF CARDIAC SAMPLING AND PRESSURE, RIGHT HEART, PERCUTANEOUS APPROACH: ICD-10-PCS | Performed by: INTERNAL MEDICINE

## 2021-01-01 PROCEDURE — 51702 INSERT TEMP BLADDER CATH: CPT

## 2021-01-01 PROCEDURE — 31500 INSERT EMERGENCY AIRWAY: CPT

## 2021-01-01 PROCEDURE — 03HY32Z INSERTION OF MONITORING DEVICE INTO UPPER ARTERY, PERCUTANEOUS APPROACH: ICD-10-PCS | Performed by: STUDENT IN AN ORGANIZED HEALTH CARE EDUCATION/TRAINING PROGRAM

## 2021-01-01 PROCEDURE — 86905 BLOOD TYPING RBC ANTIGENS: CPT

## 2021-01-01 PROCEDURE — C1752 CATH,HEMODIALYSIS,SHORT-TERM: HCPCS

## 2021-01-01 PROCEDURE — 94002 VENT MGMT INPAT INIT DAY: CPT

## 2021-01-01 PROCEDURE — 87389 HIV-1 AG W/HIV-1&-2 AB AG IA: CPT

## 2021-01-01 PROCEDURE — 76770 US EXAM ABDO BACK WALL COMP: CPT

## 2021-01-01 PROCEDURE — 87077 CULTURE AEROBIC IDENTIFY: CPT

## 2021-01-01 PROCEDURE — 93306 TTE W/DOPPLER COMPLETE: CPT | Performed by: INTERNAL MEDICINE

## 2021-01-01 PROCEDURE — 74011000250 HC RX REV CODE- 250

## 2021-01-01 PROCEDURE — 84550 ASSAY OF BLOOD/URIC ACID: CPT

## 2021-01-01 PROCEDURE — C1894 INTRO/SHEATH, NON-LASER: HCPCS

## 2021-01-01 PROCEDURE — 36556 INSERT NON-TUNNEL CV CATH: CPT

## 2021-01-01 PROCEDURE — U0005 INFEC AGEN DETEC AMPLI PROBE: HCPCS

## 2021-01-01 PROCEDURE — 85652 RBC SED RATE AUTOMATED: CPT

## 2021-01-01 PROCEDURE — 77030005513 HC CATH URETH FOL11 MDII -B

## 2021-01-01 PROCEDURE — 74011250637 HC RX REV CODE- 250/637: Performed by: ANESTHESIOLOGY

## 2021-01-01 PROCEDURE — 86860 RBC ANTIBODY ELUTION: CPT

## 2021-01-01 PROCEDURE — 99223 1ST HOSP IP/OBS HIGH 75: CPT | Performed by: NURSE PRACTITIONER

## 2021-01-01 PROCEDURE — 74011000250 HC RX REV CODE- 250: Performed by: EMERGENCY MEDICINE

## 2021-01-01 PROCEDURE — 87070 CULTURE OTHR SPECIMN AEROBIC: CPT

## 2021-01-01 PROCEDURE — G0300 HHS/HOSPICE OF LPN EA 15 MIN: HCPCS

## 2021-01-01 PROCEDURE — 99212 OFFICE O/P EST SF 10 MIN: CPT | Performed by: NURSE PRACTITIONER

## 2021-01-01 PROCEDURE — 82306 VITAMIN D 25 HYDROXY: CPT

## 2021-01-01 PROCEDURE — 3017F COLORECTAL CA SCREEN DOC REV: CPT | Performed by: INTERNAL MEDICINE

## 2021-01-01 PROCEDURE — 0BH17EZ INSERTION OF ENDOTRACHEAL AIRWAY INTO TRACHEA, VIA NATURAL OR ARTIFICIAL OPENING: ICD-10-PCS | Performed by: EMERGENCY MEDICINE

## 2021-01-01 PROCEDURE — 74011250637 HC RX REV CODE- 250/637: Performed by: EMERGENCY MEDICINE

## 2021-01-01 PROCEDURE — 30233N1 TRANSFUSION OF NONAUTOLOGOUS RED BLOOD CELLS INTO PERIPHERAL VEIN, PERCUTANEOUS APPROACH: ICD-10-PCS | Performed by: INTERNAL MEDICINE

## 2021-01-01 PROCEDURE — 0JH63XZ INSERTION OF TUNNELED VASCULAR ACCESS DEVICE INTO CHEST SUBCUTANEOUS TISSUE AND FASCIA, PERCUTANEOUS APPROACH: ICD-10-PCS | Performed by: RADIOLOGY

## 2021-01-01 PROCEDURE — 97110 THERAPEUTIC EXERCISES: CPT

## 2021-01-01 PROCEDURE — 86904 BLOOD TYPING PATIENT SERUM: CPT

## 2021-01-01 PROCEDURE — APPSS60 APP SPLIT SHARED TIME 46-60 MINUTES: Performed by: NURSE PRACTITIONER

## 2021-01-01 PROCEDURE — 82436 ASSAY OF URINE CHLORIDE: CPT

## 2021-01-01 PROCEDURE — G8428 CUR MEDS NOT DOCUMENT: HCPCS | Performed by: NURSE PRACTITIONER

## 2021-01-01 PROCEDURE — 99219 PR INITIAL OBSERVATION CARE/DAY 50 MINUTES: CPT | Performed by: NURSE PRACTITIONER

## 2021-01-01 PROCEDURE — 93321 DOPPLER ECHO F-UP/LMTD STD: CPT | Performed by: SPECIALIST

## 2021-01-01 PROCEDURE — 83051 HEMOGLOBIN PLASMA: CPT

## 2021-01-01 PROCEDURE — 95816 EEG AWAKE AND DROWSY: CPT | Performed by: PSYCHIATRY & NEUROLOGY

## 2021-01-01 PROCEDURE — 92526 ORAL FUNCTION THERAPY: CPT

## 2021-01-01 PROCEDURE — 99222 1ST HOSP IP/OBS MODERATE 55: CPT | Performed by: NURSE PRACTITIONER

## 2021-01-01 PROCEDURE — 74011000258 HC RX REV CODE- 258: Performed by: EMERGENCY MEDICINE

## 2021-01-01 PROCEDURE — 1090F PRES/ABSN URINE INCON ASSESS: CPT | Performed by: INTERNAL MEDICINE

## 2021-01-01 PROCEDURE — 95720 EEG PHY/QHP EA INCR W/VEEG: CPT | Performed by: PSYCHIATRY & NEUROLOGY

## 2021-01-01 PROCEDURE — APPSS45 APP SPLIT SHARED TIME 31-45 MINUTES: Performed by: NURSE PRACTITIONER

## 2021-01-01 PROCEDURE — 87449 NOS EACH ORGANISM AG IA: CPT

## 2021-01-01 PROCEDURE — 30233N1 TRANSFUSION OF NONAUTOLOGOUS RED BLOOD CELLS INTO PERIPHERAL VEIN, PERCUTANEOUS APPROACH: ICD-10-PCS | Performed by: NURSE PRACTITIONER

## 2021-01-01 PROCEDURE — P9045 ALBUMIN (HUMAN), 5%, 250 ML: HCPCS | Performed by: INTERNAL MEDICINE

## 2021-01-01 PROCEDURE — 86970 RBC PRETX INCUBATJ W/CHEMICL: CPT

## 2021-01-01 PROCEDURE — 97165 OT EVAL LOW COMPLEX 30 MIN: CPT

## 2021-01-01 PROCEDURE — G8536 NO DOC ELDER MAL SCRN: HCPCS | Performed by: INTERNAL MEDICINE

## 2021-01-01 RX ORDER — WARFARIN SODIUM 5 MG/1
5 TABLET ORAL DAILY
COMMUNITY
End: 2021-01-01 | Stop reason: SDUPTHER

## 2021-01-01 RX ORDER — ISOSORBIDE MONONITRATE 30 MG/1
30 TABLET, EXTENDED RELEASE ORAL DAILY
Status: DISCONTINUED | OUTPATIENT
Start: 2021-01-01 | End: 2021-01-01

## 2021-01-01 RX ORDER — WARFARIN 4 MG/1
4 TABLET ORAL EVERY OTHER DAY
COMMUNITY
End: 2021-01-01 | Stop reason: SDUPTHER

## 2021-01-01 RX ORDER — ALBUMIN HUMAN 50 G/1000ML
12.5 SOLUTION INTRAVENOUS ONCE
Status: COMPLETED | OUTPATIENT
Start: 2021-01-01 | End: 2021-01-01

## 2021-01-01 RX ORDER — WARFARIN 1 MG/1
4 TABLET ORAL ONCE
Status: COMPLETED | OUTPATIENT
Start: 2021-01-01 | End: 2021-01-01

## 2021-01-01 RX ORDER — ACETAMINOPHEN 325 MG/1
650 TABLET ORAL
Status: DISCONTINUED | OUTPATIENT
Start: 2021-01-01 | End: 2021-01-01 | Stop reason: HOSPADM

## 2021-01-01 RX ORDER — LEVOTHYROXINE SODIUM 25 UG/1
25 TABLET ORAL
Status: DISCONTINUED | OUTPATIENT
Start: 2021-01-01 | End: 2021-01-01

## 2021-01-01 RX ORDER — ALBUMIN HUMAN 50 G/1000ML
12.5 SOLUTION INTRAVENOUS EVERY 4 HOURS
Status: COMPLETED | OUTPATIENT
Start: 2021-01-01 | End: 2021-01-01

## 2021-01-01 RX ORDER — HYDROXYZINE HYDROCHLORIDE 10 MG/1
10 TABLET, FILM COATED ORAL
Status: DISCONTINUED | OUTPATIENT
Start: 2021-01-01 | End: 2021-01-01 | Stop reason: HOSPADM

## 2021-01-01 RX ORDER — MAGNESIUM SULFATE 100 %
4 CRYSTALS MISCELLANEOUS AS NEEDED
Status: DISCONTINUED | OUTPATIENT
Start: 2021-01-01 | End: 2021-01-01 | Stop reason: HOSPADM

## 2021-01-01 RX ORDER — BUDESONIDE AND FORMOTEROL FUMARATE DIHYDRATE 160; 4.5 UG/1; UG/1
1 AEROSOL RESPIRATORY (INHALATION)
Status: DISCONTINUED | OUTPATIENT
Start: 2021-01-01 | End: 2021-01-01 | Stop reason: HOSPADM

## 2021-01-01 RX ORDER — POLYETHYLENE GLYCOL 3350 17 G/17G
17 POWDER, FOR SOLUTION ORAL DAILY PRN
Status: DISCONTINUED | OUTPATIENT
Start: 2021-01-01 | End: 2021-01-01

## 2021-01-01 RX ORDER — ONDANSETRON 2 MG/ML
4 INJECTION INTRAMUSCULAR; INTRAVENOUS
Status: DISPENSED | OUTPATIENT
Start: 2021-01-01 | End: 2021-01-01

## 2021-01-01 RX ORDER — HYDROCORTISONE 1 %
CREAM (GRAM) TOPICAL AS NEEDED
Status: DISCONTINUED | OUTPATIENT
Start: 2021-01-01 | End: 2021-01-01 | Stop reason: HOSPADM

## 2021-01-01 RX ORDER — WARFARIN 2.5 MG/1
2.5 TABLET ORAL ONCE
Status: COMPLETED | OUTPATIENT
Start: 2021-01-01 | End: 2021-01-01

## 2021-01-01 RX ORDER — SODIUM CHLORIDE 0.9 % (FLUSH) 0.9 %
5-10 SYRINGE (ML) INJECTION AS NEEDED
Status: DISCONTINUED | OUTPATIENT
Start: 2021-01-01 | End: 2021-01-01 | Stop reason: HOSPADM

## 2021-01-01 RX ORDER — SODIUM CHLORIDE 9 MG/ML
250 INJECTION, SOLUTION INTRAVENOUS AS NEEDED
Status: DISCONTINUED | OUTPATIENT
Start: 2021-01-01 | End: 2021-01-01 | Stop reason: HOSPADM

## 2021-01-01 RX ORDER — SODIUM CHLORIDE 9 MG/ML
50 INJECTION, SOLUTION INTRAVENOUS CONTINUOUS
Status: DISCONTINUED | OUTPATIENT
Start: 2021-01-01 | End: 2021-01-01 | Stop reason: HOSPADM

## 2021-01-01 RX ORDER — HYDRALAZINE HYDROCHLORIDE 20 MG/ML
20 INJECTION INTRAMUSCULAR; INTRAVENOUS
Status: DISCONTINUED | OUTPATIENT
Start: 2021-01-01 | End: 2021-01-01 | Stop reason: HOSPADM

## 2021-01-01 RX ORDER — IPRATROPIUM BROMIDE AND ALBUTEROL SULFATE 2.5; .5 MG/3ML; MG/3ML
3 SOLUTION RESPIRATORY (INHALATION)
Status: DISCONTINUED | OUTPATIENT
Start: 2021-01-01 | End: 2021-01-01

## 2021-01-01 RX ORDER — LIDOCAINE HCL/PF 100 MG/5ML
SYRINGE (ML) INTRAVENOUS
Status: DISPENSED
Start: 2021-01-01 | End: 2021-01-01

## 2021-01-01 RX ORDER — BUMETANIDE 0.25 MG/ML
2 INJECTION INTRAMUSCULAR; INTRAVENOUS 2 TIMES DAILY
Status: DISCONTINUED | OUTPATIENT
Start: 2021-01-01 | End: 2021-01-01

## 2021-01-01 RX ORDER — ROCURONIUM BROMIDE 10 MG/ML
100 INJECTION, SOLUTION INTRAVENOUS
Status: COMPLETED | OUTPATIENT
Start: 2021-01-01 | End: 2021-01-01

## 2021-01-01 RX ORDER — BUMETANIDE 2 MG/1
2 TABLET ORAL 3 TIMES DAILY
Qty: 90 TAB | Refills: 0 | Status: SHIPPED | OUTPATIENT
Start: 2021-01-01 | End: 2021-01-01

## 2021-01-01 RX ORDER — HYDROCODONE BITARTRATE AND ACETAMINOPHEN 5; 325 MG/1; MG/1
1 TABLET ORAL
Status: DISCONTINUED | OUTPATIENT
Start: 2021-01-01 | End: 2021-01-01

## 2021-01-01 RX ORDER — HEPARIN SODIUM 200 [USP'U]/100ML
INJECTION, SOLUTION INTRAVENOUS
Status: COMPLETED | OUTPATIENT
Start: 2021-01-01 | End: 2021-01-01

## 2021-01-01 RX ORDER — SODIUM CHLORIDE 0.9 % (FLUSH) 0.9 %
5-40 SYRINGE (ML) INJECTION AS NEEDED
Status: DISCONTINUED | OUTPATIENT
Start: 2021-01-01 | End: 2021-09-08 | Stop reason: HOSPADM

## 2021-01-01 RX ORDER — HYDROCODONE BITARTRATE AND ACETAMINOPHEN 5; 325 MG/1; MG/1
2 TABLET ORAL
Status: DISCONTINUED | OUTPATIENT
Start: 2021-01-01 | End: 2021-01-01

## 2021-01-01 RX ORDER — WARFARIN 1 MG/1
3 TABLET ORAL ONCE
Status: COMPLETED | OUTPATIENT
Start: 2021-01-01 | End: 2021-01-01

## 2021-01-01 RX ORDER — HYDROCODONE BITARTRATE AND ACETAMINOPHEN 5; 325 MG/1; MG/1
1 TABLET ORAL
Status: COMPLETED | OUTPATIENT
Start: 2021-01-01 | End: 2021-01-01

## 2021-01-01 RX ORDER — SODIUM CHLORIDE 9 MG/ML
250 INJECTION, SOLUTION INTRAVENOUS AS NEEDED
Status: DISCONTINUED | OUTPATIENT
Start: 2021-01-01 | End: 2021-01-01

## 2021-01-01 RX ORDER — PAROXETINE HYDROCHLORIDE 20 MG/1
20 TABLET, FILM COATED ORAL DAILY
Qty: 30 TABLET | Refills: 2 | Status: SHIPPED | OUTPATIENT
Start: 2021-01-01

## 2021-01-01 RX ORDER — HYDRALAZINE HYDROCHLORIDE 20 MG/ML
10 INJECTION INTRAMUSCULAR; INTRAVENOUS EVERY 6 HOURS
Status: DISCONTINUED | OUTPATIENT
Start: 2021-01-01 | End: 2021-09-08 | Stop reason: HOSPADM

## 2021-01-01 RX ORDER — ROCURONIUM BROMIDE 10 MG/ML
INJECTION, SOLUTION INTRAVENOUS
Status: COMPLETED
Start: 2021-01-01 | End: 2021-01-01

## 2021-01-01 RX ORDER — BUMETANIDE 0.25 MG/ML
2 INJECTION INTRAMUSCULAR; INTRAVENOUS 3 TIMES DAILY
Status: DISCONTINUED | OUTPATIENT
Start: 2021-01-01 | End: 2021-01-01

## 2021-01-01 RX ORDER — FERROUS SULFATE TAB 325 MG (65 MG ELEMENTAL FE) 325 (65 FE) MG
TAB ORAL
Qty: 30 TAB | Refills: 1 | Status: SHIPPED | OUTPATIENT
Start: 2021-01-01 | End: 2021-01-01

## 2021-01-01 RX ORDER — VANCOMYCIN 2 GRAM/500 ML IN 0.9 % SODIUM CHLORIDE INTRAVENOUS
2000 ONCE
Status: COMPLETED | OUTPATIENT
Start: 2021-01-01 | End: 2021-01-01

## 2021-01-01 RX ORDER — ENOXAPARIN SODIUM 150 MG/ML
120 INJECTION SUBCUTANEOUS EVERY 12 HOURS
Qty: 12 SYRINGE | Refills: 2 | Status: ON HOLD | OUTPATIENT
Start: 2021-01-01 | End: 2021-01-01

## 2021-01-01 RX ORDER — WARFARIN 4 MG/1
4 TABLET ORAL ONCE
Status: COMPLETED | OUTPATIENT
Start: 2021-01-01 | End: 2021-01-01

## 2021-01-01 RX ORDER — LEVETIRACETAM 500 MG/1
250 TABLET ORAL 2 TIMES DAILY
Qty: 60 TABLET | Refills: 2 | Status: SHIPPED | OUTPATIENT
Start: 2021-01-01

## 2021-01-01 RX ORDER — SODIUM CHLORIDE 0.9 % (FLUSH) 0.9 %
5-40 SYRINGE (ML) INJECTION EVERY 8 HOURS
Status: DISCONTINUED | OUTPATIENT
Start: 2021-01-01 | End: 2021-01-01 | Stop reason: HOSPADM

## 2021-01-01 RX ORDER — LORAZEPAM 2 MG/ML
INJECTION INTRAMUSCULAR
Status: DISCONTINUED
Start: 2021-01-01 | End: 2021-01-01 | Stop reason: WASHOUT

## 2021-01-01 RX ORDER — HYDROCODONE BITARTRATE AND ACETAMINOPHEN 5; 325 MG/1; MG/1
1 TABLET ORAL ONCE
Status: COMPLETED | OUTPATIENT
Start: 2021-01-01 | End: 2021-01-01

## 2021-01-01 RX ORDER — FACIAL-BODY WIPES
10 EACH TOPICAL DAILY PRN
Status: DISCONTINUED | OUTPATIENT
Start: 2021-01-01 | End: 2021-01-01 | Stop reason: SDUPTHER

## 2021-01-01 RX ORDER — HEPARIN SODIUM 1000 [USP'U]/ML
4000 INJECTION, SOLUTION INTRAVENOUS; SUBCUTANEOUS ONCE
Status: COMPLETED | OUTPATIENT
Start: 2021-01-01 | End: 2021-01-01

## 2021-01-01 RX ORDER — ISOSORBIDE MONONITRATE 30 MG/1
30 TABLET, EXTENDED RELEASE ORAL DAILY
Qty: 30 TABLET | Refills: 2 | Status: SHIPPED | OUTPATIENT
Start: 2021-01-01

## 2021-01-01 RX ORDER — CEPHALEXIN 500 MG/1
500 CAPSULE ORAL 2 TIMES DAILY
Status: DISCONTINUED | OUTPATIENT
Start: 2021-01-01 | End: 2021-01-01

## 2021-01-01 RX ORDER — FLUCONAZOLE 100 MG/1
100 TABLET ORAL DAILY
Status: DISCONTINUED | OUTPATIENT
Start: 2021-01-01 | End: 2021-09-08 | Stop reason: HOSPADM

## 2021-01-01 RX ORDER — FAMOTIDINE 20 MG/1
20 TABLET, FILM COATED ORAL DAILY
Status: DISCONTINUED | OUTPATIENT
Start: 2021-01-01 | End: 2021-01-01

## 2021-01-01 RX ORDER — ALBUMIN HUMAN 250 G/1000ML
50 SOLUTION INTRAVENOUS
Status: DISCONTINUED | OUTPATIENT
Start: 2021-01-01 | End: 2021-01-01 | Stop reason: HOSPADM

## 2021-01-01 RX ORDER — MORPHINE SULFATE 4 MG/ML
4 INJECTION INTRAVENOUS
Status: DISCONTINUED | OUTPATIENT
Start: 2021-01-01 | End: 2021-01-01

## 2021-01-01 RX ORDER — AMOXICILLIN 250 MG
1 CAPSULE ORAL DAILY
Status: DISCONTINUED | OUTPATIENT
Start: 2021-01-01 | End: 2021-01-01

## 2021-01-01 RX ORDER — ALBUTEROL SULFATE 0.83 MG/ML
2.5 SOLUTION RESPIRATORY (INHALATION)
Status: DISCONTINUED | OUTPATIENT
Start: 2021-01-01 | End: 2021-01-01

## 2021-01-01 RX ORDER — HEPARIN SODIUM 1000 [USP'U]/ML
10000 INJECTION, SOLUTION INTRAVENOUS; SUBCUTANEOUS
Status: COMPLETED | OUTPATIENT
Start: 2021-01-01 | End: 2021-01-01

## 2021-01-01 RX ORDER — RANOLAZINE 500 MG/1
1000 TABLET, EXTENDED RELEASE ORAL 2 TIMES DAILY
Refills: 3 | Status: DISCONTINUED | OUTPATIENT
Start: 2021-01-01 | End: 2021-01-01 | Stop reason: HOSPADM

## 2021-01-01 RX ORDER — ALBUMIN HUMAN 250 G/1000ML
25 SOLUTION INTRAVENOUS EVERY 6 HOURS
Status: COMPLETED | OUTPATIENT
Start: 2021-01-01 | End: 2021-01-01

## 2021-01-01 RX ORDER — SODIUM CHLORIDE 0.9 % (FLUSH) 0.9 %
5-40 SYRINGE (ML) INJECTION AS NEEDED
Status: DISCONTINUED | OUTPATIENT
Start: 2021-01-01 | End: 2021-01-01 | Stop reason: HOSPADM

## 2021-01-01 RX ORDER — HYDROCODONE BITARTRATE AND ACETAMINOPHEN 5; 325 MG/1; MG/1
2 TABLET ORAL
Status: DISCONTINUED | OUTPATIENT
Start: 2021-01-01 | End: 2021-09-08 | Stop reason: HOSPADM

## 2021-01-01 RX ORDER — HEPARIN SODIUM 10000 [USP'U]/100ML
9-25 INJECTION, SOLUTION INTRAVENOUS
Status: DISCONTINUED | OUTPATIENT
Start: 2021-01-01 | End: 2021-01-01

## 2021-01-01 RX ORDER — HYDROXYZINE 25 MG/1
25 TABLET, FILM COATED ORAL
Status: DISCONTINUED | OUTPATIENT
Start: 2021-01-01 | End: 2021-01-01

## 2021-01-01 RX ORDER — PAROXETINE HYDROCHLORIDE 20 MG/1
20 TABLET, FILM COATED ORAL DAILY
Status: DISCONTINUED | OUTPATIENT
Start: 2021-01-01 | End: 2021-01-01 | Stop reason: HOSPADM

## 2021-01-01 RX ORDER — WARFARIN SODIUM 5 MG/1
5 TABLET ORAL DAILY
Qty: 45 TAB | Refills: 5 | Status: SHIPPED | OUTPATIENT
Start: 2021-01-01 | End: 2021-01-01 | Stop reason: SDUPTHER

## 2021-01-01 RX ORDER — ACETAMINOPHEN 650 MG/1
650 SUPPOSITORY RECTAL
Status: DISCONTINUED | OUTPATIENT
Start: 2021-01-01 | End: 2021-01-01 | Stop reason: HOSPADM

## 2021-01-01 RX ORDER — SODIUM CHLORIDE 0.9 % (FLUSH) 0.9 %
5-40 SYRINGE (ML) INJECTION EVERY 8 HOURS
Status: DISCONTINUED | OUTPATIENT
Start: 2021-01-01 | End: 2021-09-08 | Stop reason: HOSPADM

## 2021-01-01 RX ORDER — ONDANSETRON 2 MG/ML
4 INJECTION INTRAMUSCULAR; INTRAVENOUS
Status: DISCONTINUED | OUTPATIENT
Start: 2021-01-01 | End: 2021-09-08 | Stop reason: HOSPADM

## 2021-01-01 RX ORDER — HYDROCORTISONE 1 %
CREAM (GRAM) TOPICAL 2 TIMES DAILY
Status: DISCONTINUED | OUTPATIENT
Start: 2021-01-01 | End: 2021-01-01 | Stop reason: HOSPADM

## 2021-01-01 RX ORDER — SODIUM CHLORIDE 9 MG/ML
50 INJECTION, SOLUTION INTRAVENOUS CONTINUOUS
Status: DISCONTINUED | OUTPATIENT
Start: 2021-01-01 | End: 2021-01-01

## 2021-01-01 RX ORDER — ALBUMIN HUMAN 50 G/1000ML
SOLUTION INTRAVENOUS
Status: COMPLETED
Start: 2021-01-01 | End: 2021-01-01

## 2021-01-01 RX ORDER — DIPHENHYDRAMINE HYDROCHLORIDE 50 MG/ML
25 INJECTION, SOLUTION INTRAMUSCULAR; INTRAVENOUS ONCE
Status: COMPLETED | OUTPATIENT
Start: 2021-01-01 | End: 2021-01-01

## 2021-01-01 RX ORDER — CEPHALEXIN 500 MG/1
500 CAPSULE ORAL 2 TIMES DAILY
Status: DISCONTINUED | OUTPATIENT
Start: 2021-01-01 | End: 2021-09-08 | Stop reason: HOSPADM

## 2021-01-01 RX ORDER — GABAPENTIN 100 MG/1
100 CAPSULE ORAL 3 TIMES DAILY
Status: DISCONTINUED | OUTPATIENT
Start: 2021-01-01 | End: 2021-01-01

## 2021-01-01 RX ORDER — WARFARIN 1 MG/1
4 TABLET ORAL DAILY
Qty: 180 TAB | Refills: 5 | Status: ON HOLD | OUTPATIENT
Start: 2021-01-01 | End: 2021-01-01 | Stop reason: DRUGHIGH

## 2021-01-01 RX ORDER — ALBUMIN HUMAN 50 G/1000ML
12.5 SOLUTION INTRAVENOUS ONCE
Status: DISCONTINUED | OUTPATIENT
Start: 2021-01-01 | End: 2021-01-01

## 2021-01-01 RX ORDER — KETAMINE HYDROCHLORIDE 50 MG/ML
100 INJECTION, SOLUTION INTRAMUSCULAR; INTRAVENOUS
Status: COMPLETED | OUTPATIENT
Start: 2021-01-01 | End: 2021-01-01

## 2021-01-01 RX ORDER — OXYMETAZOLINE HCL 0.05 %
2 SPRAY, NON-AEROSOL (ML) NASAL
Status: DISCONTINUED | OUTPATIENT
Start: 2021-01-01 | End: 2021-01-01 | Stop reason: HOSPADM

## 2021-01-01 RX ORDER — GABAPENTIN 100 MG/1
200 CAPSULE ORAL 3 TIMES DAILY
Status: DISCONTINUED | OUTPATIENT
Start: 2021-01-01 | End: 2021-01-01

## 2021-01-01 RX ORDER — IPRATROPIUM BROMIDE AND ALBUTEROL SULFATE 2.5; .5 MG/3ML; MG/3ML
3 SOLUTION RESPIRATORY (INHALATION)
Status: COMPLETED | OUTPATIENT
Start: 2021-01-01 | End: 2021-01-01

## 2021-01-01 RX ORDER — WARFARIN 3 MG/1
3 TABLET ORAL EVERY OTHER DAY
COMMUNITY
End: 2021-01-01 | Stop reason: SDUPTHER

## 2021-01-01 RX ORDER — MICONAZOLE NITRATE 2 %
POWDER (GRAM) TOPICAL 2 TIMES DAILY
Status: DISCONTINUED | OUTPATIENT
Start: 2021-01-01 | End: 2021-01-01 | Stop reason: HOSPADM

## 2021-01-01 RX ORDER — HYDROCODONE BITARTRATE AND ACETAMINOPHEN 5; 325 MG/1; MG/1
1 TABLET ORAL
Status: DISCONTINUED | OUTPATIENT
Start: 2021-01-01 | End: 2021-09-08 | Stop reason: HOSPADM

## 2021-01-01 RX ORDER — TORSEMIDE 100 MG/1
50 TABLET ORAL DAILY
Qty: 30 TAB | Refills: 2 | Status: SHIPPED | OUTPATIENT
Start: 2021-01-01 | End: 2021-01-01

## 2021-01-01 RX ORDER — RANOLAZINE 500 MG/1
TABLET, EXTENDED RELEASE ORAL
Qty: 360 TAB | Refills: 0 | Status: SHIPPED | OUTPATIENT
Start: 2021-01-01 | End: 2021-01-01

## 2021-01-01 RX ORDER — FENTANYL CITRATE 50 UG/ML
200 INJECTION, SOLUTION INTRAMUSCULAR; INTRAVENOUS
Status: DISCONTINUED | OUTPATIENT
Start: 2021-01-01 | End: 2021-01-01

## 2021-01-01 RX ORDER — CEPHALEXIN 250 MG/1
250 CAPSULE ORAL 2 TIMES DAILY
Status: DISCONTINUED | OUTPATIENT
Start: 2021-01-01 | End: 2021-01-01 | Stop reason: HOSPADM

## 2021-01-01 RX ORDER — WARFARIN 2 MG/1
2 TABLET ORAL ONCE
Status: COMPLETED | OUTPATIENT
Start: 2021-01-01 | End: 2021-01-01

## 2021-01-01 RX ORDER — LIDOCAINE HYDROCHLORIDE 10 MG/ML
INJECTION INFILTRATION; PERINEURAL AS NEEDED
Status: DISCONTINUED | OUTPATIENT
Start: 2021-01-01 | End: 2021-01-01 | Stop reason: HOSPADM

## 2021-01-01 RX ORDER — PROPOFOL 10 MG/ML
0-50 VIAL (ML) INTRAVENOUS
Status: DISCONTINUED | OUTPATIENT
Start: 2021-01-01 | End: 2021-01-01

## 2021-01-01 RX ORDER — LEVETIRACETAM 250 MG/1
250 TABLET ORAL 2 TIMES DAILY
Status: DISCONTINUED | OUTPATIENT
Start: 2021-01-01 | End: 2021-09-08 | Stop reason: HOSPADM

## 2021-01-01 RX ORDER — CALCIUM GLUCONATE 20 MG/ML
2 INJECTION, SOLUTION INTRAVENOUS ONCE
Status: COMPLETED | OUTPATIENT
Start: 2021-01-01 | End: 2021-01-01

## 2021-01-01 RX ORDER — INSULIN GLARGINE 100 [IU]/ML
12 INJECTION, SOLUTION SUBCUTANEOUS
Status: DISCONTINUED | OUTPATIENT
Start: 2021-01-01 | End: 2021-01-01 | Stop reason: HOSPADM

## 2021-01-01 RX ORDER — PAROXETINE HYDROCHLORIDE 20 MG/1
10 TABLET, FILM COATED ORAL DAILY
Status: DISCONTINUED | OUTPATIENT
Start: 2021-01-01 | End: 2021-01-01 | Stop reason: HOSPADM

## 2021-01-01 RX ORDER — ONDANSETRON 4 MG/1
4 TABLET, ORALLY DISINTEGRATING ORAL
Status: DISCONTINUED | OUTPATIENT
Start: 2021-01-01 | End: 2021-09-08 | Stop reason: HOSPADM

## 2021-01-01 RX ORDER — ISOSORBIDE MONONITRATE 30 MG/1
30 TABLET, EXTENDED RELEASE ORAL 2 TIMES DAILY
Status: DISCONTINUED | OUTPATIENT
Start: 2021-01-01 | End: 2021-01-01 | Stop reason: HOSPADM

## 2021-01-01 RX ORDER — ISOSORBIDE MONONITRATE 30 MG/1
30 TABLET, EXTENDED RELEASE ORAL DAILY
Qty: 30 TAB | Refills: 0 | Status: SHIPPED | OUTPATIENT
Start: 2021-01-01 | End: 2021-01-01 | Stop reason: ALTCHOICE

## 2021-01-01 RX ORDER — BUMETANIDE 0.25 MG/ML
1 INJECTION INTRAMUSCULAR; INTRAVENOUS 2 TIMES DAILY
Status: DISCONTINUED | OUTPATIENT
Start: 2021-01-01 | End: 2021-01-01

## 2021-01-01 RX ORDER — PROPOFOL 10 MG/ML
INJECTION, EMULSION INTRAVENOUS
Status: COMPLETED
Start: 2021-01-01 | End: 2021-01-01

## 2021-01-01 RX ORDER — LIDOCAINE HYDROCHLORIDE 20 MG/ML
20 INJECTION, SOLUTION INFILTRATION; PERINEURAL
Status: COMPLETED | OUTPATIENT
Start: 2021-01-01 | End: 2021-01-01

## 2021-01-01 RX ORDER — SIMETHICONE 80 MG
80 TABLET,CHEWABLE ORAL
Status: DISCONTINUED | OUTPATIENT
Start: 2021-01-01 | End: 2021-01-01 | Stop reason: HOSPADM

## 2021-01-01 RX ORDER — INSULIN LISPRO 100 [IU]/ML
INJECTION, SOLUTION INTRAVENOUS; SUBCUTANEOUS
Status: DISCONTINUED | OUTPATIENT
Start: 2021-01-01 | End: 2021-01-01 | Stop reason: HOSPADM

## 2021-01-01 RX ORDER — DEXTROSE 50 % IN WATER (D50W) INTRAVENOUS SYRINGE
12.5-25 AS NEEDED
Status: DISCONTINUED | OUTPATIENT
Start: 2021-01-01 | End: 2021-01-01 | Stop reason: HOSPADM

## 2021-01-01 RX ORDER — HYDRALAZINE HYDROCHLORIDE 50 MG/1
100 TABLET, FILM COATED ORAL 3 TIMES DAILY
Status: DISCONTINUED | OUTPATIENT
Start: 2021-01-01 | End: 2021-01-01 | Stop reason: HOSPADM

## 2021-01-01 RX ORDER — RANOLAZINE 500 MG/1
TABLET, EXTENDED RELEASE ORAL
Qty: 120 TAB | Refills: 3 | Status: SHIPPED | OUTPATIENT
Start: 2021-01-01 | End: 2021-01-01

## 2021-01-01 RX ORDER — BUMETANIDE 1 MG/1
1 TABLET ORAL DAILY
Qty: 45 TABLET | Refills: 1 | Status: SHIPPED | OUTPATIENT
Start: 2021-01-01

## 2021-01-01 RX ORDER — ALBUMIN HUMAN 250 G/1000ML
12.5 SOLUTION INTRAVENOUS EVERY 6 HOURS
Status: COMPLETED | OUTPATIENT
Start: 2021-01-01 | End: 2021-01-01

## 2021-01-01 RX ORDER — NYSTATIN 100000 [USP'U]/G
POWDER TOPICAL AS NEEDED
Status: DISCONTINUED | OUTPATIENT
Start: 2021-01-01 | End: 2021-01-01 | Stop reason: HOSPADM

## 2021-01-01 RX ORDER — ACETAMINOPHEN 325 MG/1
650 TABLET ORAL
Status: DISCONTINUED | OUTPATIENT
Start: 2021-01-01 | End: 2021-09-08 | Stop reason: HOSPADM

## 2021-01-01 RX ORDER — LOPERAMIDE HYDROCHLORIDE 2 MG/1
2 CAPSULE ORAL
Status: DISCONTINUED | OUTPATIENT
Start: 2021-01-01 | End: 2021-01-01 | Stop reason: HOSPADM

## 2021-01-01 RX ORDER — ALBUTEROL SULFATE 90 UG/1
2 AEROSOL, METERED RESPIRATORY (INHALATION)
Status: DISCONTINUED | OUTPATIENT
Start: 2021-01-01 | End: 2021-01-01 | Stop reason: HOSPADM

## 2021-01-01 RX ORDER — LANOLIN ALCOHOL/MO/W.PET/CERES
400 CREAM (GRAM) TOPICAL DAILY
Qty: 30 TABLET | Refills: 2 | Status: SHIPPED | OUTPATIENT
Start: 2021-01-01

## 2021-01-01 RX ORDER — HEPARIN SODIUM 1000 [USP'U]/ML
5000 INJECTION, SOLUTION INTRAVENOUS; SUBCUTANEOUS
Status: COMPLETED | OUTPATIENT
Start: 2021-01-01 | End: 2021-01-01

## 2021-01-01 RX ORDER — PAROXETINE HYDROCHLORIDE 20 MG/1
20 TABLET, FILM COATED ORAL DAILY
Status: ON HOLD | COMMUNITY
End: 2021-01-01 | Stop reason: SDUPTHER

## 2021-01-01 RX ORDER — POTASSIUM CHLORIDE 750 MG/1
20 TABLET, FILM COATED, EXTENDED RELEASE ORAL DAILY
Status: DISCONTINUED | OUTPATIENT
Start: 2021-01-01 | End: 2021-01-01 | Stop reason: HOSPADM

## 2021-01-01 RX ORDER — HYDRALAZINE HYDROCHLORIDE 50 MG/1
100 TABLET, FILM COATED ORAL 3 TIMES DAILY
Status: DISCONTINUED | OUTPATIENT
Start: 2021-01-01 | End: 2021-09-08 | Stop reason: HOSPADM

## 2021-01-01 RX ORDER — PAROXETINE HYDROCHLORIDE 20 MG/1
20 TABLET, FILM COATED ORAL DAILY
Status: DISCONTINUED | OUTPATIENT
Start: 2021-01-01 | End: 2021-01-01

## 2021-01-01 RX ORDER — WARFARIN 1 MG/1
4 TABLET ORAL DAILY
Qty: 180 TAB | Refills: 5 | Status: SHIPPED | OUTPATIENT
Start: 2021-01-01 | End: 2021-01-01 | Stop reason: SDUPTHER

## 2021-01-01 RX ORDER — HYDRALAZINE HYDROCHLORIDE 20 MG/ML
10 INJECTION INTRAMUSCULAR; INTRAVENOUS
Status: DISCONTINUED | OUTPATIENT
Start: 2021-01-01 | End: 2021-01-01 | Stop reason: HOSPADM

## 2021-01-01 RX ORDER — DEXTROSE 50 % IN WATER (D50W) INTRAVENOUS SYRINGE
12.5-25 AS NEEDED
Status: DISCONTINUED | OUTPATIENT
Start: 2021-01-01 | End: 2021-01-01 | Stop reason: SDUPTHER

## 2021-01-01 RX ORDER — NOREPINEPHRINE BITARTRATE/D5W 8 MG/250ML
.5-16 PLASTIC BAG, INJECTION (ML) INTRAVENOUS
Status: DISCONTINUED | OUTPATIENT
Start: 2021-01-01 | End: 2021-01-01

## 2021-01-01 RX ORDER — DEXMEDETOMIDINE HYDROCHLORIDE 4 UG/ML
.1-1.5 INJECTION, SOLUTION INTRAVENOUS
Status: DISCONTINUED | OUTPATIENT
Start: 2021-01-01 | End: 2021-01-01 | Stop reason: HOSPADM

## 2021-01-01 RX ORDER — FENTANYL CITRATE 50 UG/ML
25 INJECTION, SOLUTION INTRAMUSCULAR; INTRAVENOUS ONCE
Status: COMPLETED | OUTPATIENT
Start: 2021-01-01 | End: 2021-01-01

## 2021-01-01 RX ORDER — LANOLIN ALCOHOL/MO/W.PET/CERES
3 CREAM (GRAM) TOPICAL
Qty: 30 TABLET | Refills: 1 | Status: SHIPPED | OUTPATIENT
Start: 2021-01-01

## 2021-01-01 RX ORDER — THERA TABS 400 MCG
1 TAB ORAL DAILY
Qty: 30 TAB | Refills: 0 | Status: SHIPPED | OUTPATIENT
Start: 2021-01-01 | End: 2021-01-01

## 2021-01-01 RX ORDER — WARFARIN 1 MG/1
2 TABLET ORAL ONCE
Status: DISCONTINUED | OUTPATIENT
Start: 2021-01-01 | End: 2021-01-01

## 2021-01-01 RX ORDER — THERA TABS 400 MCG
1 TAB ORAL DAILY
Status: DISCONTINUED | OUTPATIENT
Start: 2021-01-01 | End: 2021-01-01 | Stop reason: HOSPADM

## 2021-01-01 RX ORDER — CEPHALEXIN 250 MG/1
250 CAPSULE ORAL 2 TIMES DAILY
Qty: 60 CAP | Refills: 0 | Status: SHIPPED | OUTPATIENT
Start: 2021-01-01 | End: 2021-01-01

## 2021-01-01 RX ORDER — SODIUM CHLORIDE 9 MG/ML
250 INJECTION, SOLUTION INTRAVENOUS AS NEEDED
Status: DISCONTINUED | OUTPATIENT
Start: 2021-01-01 | End: 2021-09-08 | Stop reason: HOSPADM

## 2021-01-01 RX ORDER — DEXTROSE 50 % IN WATER (D50W) INTRAVENOUS SYRINGE
25-50 AS NEEDED
Status: DISCONTINUED | OUTPATIENT
Start: 2021-01-01 | End: 2021-01-01 | Stop reason: HOSPADM

## 2021-01-01 RX ORDER — INSULIN GLARGINE 100 [IU]/ML
20 INJECTION, SOLUTION SUBCUTANEOUS 2 TIMES DAILY
Status: DISCONTINUED | OUTPATIENT
Start: 2021-01-01 | End: 2021-01-01

## 2021-01-01 RX ORDER — IPRATROPIUM BROMIDE AND ALBUTEROL SULFATE 2.5; .5 MG/3ML; MG/3ML
3 SOLUTION RESPIRATORY (INHALATION)
Status: DISCONTINUED | OUTPATIENT
Start: 2021-01-01 | End: 2021-01-01 | Stop reason: HOSPADM

## 2021-01-01 RX ORDER — ONDANSETRON 2 MG/ML
4 INJECTION INTRAMUSCULAR; INTRAVENOUS
Status: DISCONTINUED | OUTPATIENT
Start: 2021-01-01 | End: 2021-01-01 | Stop reason: HOSPADM

## 2021-01-01 RX ORDER — HYDRALAZINE HYDROCHLORIDE 25 MG/1
100 TABLET, FILM COATED ORAL 3 TIMES DAILY
Qty: 1080 TAB | Refills: 0 | Status: SHIPPED | OUTPATIENT
Start: 2021-01-01 | End: 2021-01-01

## 2021-01-01 RX ORDER — FACIAL-BODY WIPES
10 EACH TOPICAL
Status: COMPLETED | OUTPATIENT
Start: 2021-01-01 | End: 2021-01-01

## 2021-01-01 RX ORDER — POTASSIUM CHLORIDE 29.8 MG/ML
20 INJECTION INTRAVENOUS ONCE
Status: COMPLETED | OUTPATIENT
Start: 2021-01-01 | End: 2021-01-01

## 2021-01-01 RX ORDER — WARFARIN 2 MG/1
4 TABLET ORAL ONCE
Status: COMPLETED | OUTPATIENT
Start: 2021-01-01 | End: 2021-01-01

## 2021-01-01 RX ORDER — WARFARIN SODIUM 5 MG/1
5 TABLET ORAL DAILY
Qty: 45 TAB | Refills: 5 | Status: ON HOLD | OUTPATIENT
Start: 2021-01-01 | End: 2021-01-01 | Stop reason: DRUGHIGH

## 2021-01-01 RX ORDER — PAROXETINE HYDROCHLORIDE 20 MG/1
10 TABLET, FILM COATED ORAL DAILY
Qty: 30 TABLET | Refills: 1 | Status: SHIPPED | OUTPATIENT
Start: 2021-01-01 | End: 2021-01-01 | Stop reason: ALTCHOICE

## 2021-01-01 RX ORDER — DEXTROMETHORPHAN POLISTIREX 30 MG/5 ML
SUSPENSION, EXTENDED RELEASE 12 HR ORAL AS NEEDED
Status: DISCONTINUED | OUTPATIENT
Start: 2021-01-01 | End: 2021-01-01 | Stop reason: HOSPADM

## 2021-01-01 RX ORDER — GUAIFENESIN 600 MG/1
600 TABLET, EXTENDED RELEASE ORAL EVERY 12 HOURS
Status: DISCONTINUED | OUTPATIENT
Start: 2021-01-01 | End: 2021-09-08 | Stop reason: HOSPADM

## 2021-01-01 RX ORDER — PSEUDOEPHED/ACETAMINOPHEN/CPM 30-500-2MG
2 TABLET ORAL
Status: DISCONTINUED | OUTPATIENT
Start: 2021-01-01 | End: 2021-01-01

## 2021-01-01 RX ORDER — HYDROCODONE BITARTRATE AND ACETAMINOPHEN 5; 325 MG/1; MG/1
1 TABLET ORAL
Status: DISCONTINUED | OUTPATIENT
Start: 2021-01-01 | End: 2021-01-01 | Stop reason: SDUPTHER

## 2021-01-01 RX ORDER — RANOLAZINE 500 MG/1
1000 TABLET, EXTENDED RELEASE ORAL 2 TIMES DAILY
Refills: 3 | Status: DISCONTINUED | OUTPATIENT
Start: 2021-01-01 | End: 2021-01-01

## 2021-01-01 RX ORDER — HEPARIN SODIUM 1000 [USP'U]/ML
INJECTION, SOLUTION INTRAVENOUS; SUBCUTANEOUS
Status: COMPLETED
Start: 2021-01-01 | End: 2021-01-01

## 2021-01-01 RX ORDER — POTASSIUM CHLORIDE 750 MG/1
40 TABLET, EXTENDED RELEASE ORAL DAILY
Qty: 60 TABLET | Refills: 2 | Status: SHIPPED | OUTPATIENT
Start: 2021-01-01

## 2021-01-01 RX ORDER — HEPARIN SODIUM 1000 [USP'U]/ML
3800 INJECTION, SOLUTION INTRAVENOUS; SUBCUTANEOUS
Status: DISCONTINUED | OUTPATIENT
Start: 2021-01-01 | End: 2021-01-01 | Stop reason: DRUGHIGH

## 2021-01-01 RX ORDER — WARFARIN 2 MG/1
4 TABLET ORAL ONCE
Status: ACTIVE | OUTPATIENT
Start: 2021-01-01 | End: 2021-01-01

## 2021-01-01 RX ORDER — SODIUM CHLORIDE 9 MG/ML
11 INJECTION, SOLUTION INTRAVENOUS CONTINUOUS
Status: DISCONTINUED | OUTPATIENT
Start: 2021-01-01 | End: 2021-01-01

## 2021-01-01 RX ORDER — MAGNESIUM SULFATE 1 G/100ML
1 INJECTION INTRAVENOUS ONCE
Status: COMPLETED | OUTPATIENT
Start: 2021-01-01 | End: 2021-01-01

## 2021-01-01 RX ORDER — BALSAM PERU/CASTOR OIL
OINTMENT (GRAM) TOPICAL 2 TIMES DAILY
Status: DISCONTINUED | OUTPATIENT
Start: 2021-01-01 | End: 2021-01-01 | Stop reason: HOSPADM

## 2021-01-01 RX ORDER — ACETAMINOPHEN 650 MG/1
650 SUPPOSITORY RECTAL
Status: DISCONTINUED | OUTPATIENT
Start: 2021-01-01 | End: 2021-09-08 | Stop reason: HOSPADM

## 2021-01-01 RX ORDER — PAROXETINE HYDROCHLORIDE 20 MG/1
20 TABLET, FILM COATED ORAL DAILY
Status: DISCONTINUED | OUTPATIENT
Start: 2021-01-01 | End: 2021-09-08 | Stop reason: HOSPADM

## 2021-01-01 RX ORDER — ISOSORBIDE MONONITRATE 30 MG/1
30 TABLET, EXTENDED RELEASE ORAL DAILY
Status: DISCONTINUED | OUTPATIENT
Start: 2021-01-01 | End: 2021-01-01 | Stop reason: HOSPADM

## 2021-01-01 RX ORDER — SENNOSIDES 8.6 MG/1
1 TABLET ORAL DAILY PRN
Status: DISCONTINUED | OUTPATIENT
Start: 2021-01-01 | End: 2021-09-08 | Stop reason: HOSPADM

## 2021-01-01 RX ORDER — POLYETHYLENE GLYCOL 3350 17 G/17G
17 POWDER, FOR SOLUTION ORAL DAILY
Status: DISCONTINUED | OUTPATIENT
Start: 2021-01-01 | End: 2021-01-01

## 2021-01-01 RX ORDER — BUMETANIDE 1 MG/1
2 TABLET ORAL 3 TIMES DAILY
Status: DISCONTINUED | OUTPATIENT
Start: 2021-01-01 | End: 2021-01-01 | Stop reason: HOSPADM

## 2021-01-01 RX ORDER — HYDRALAZINE HYDROCHLORIDE 20 MG/ML
10 INJECTION INTRAMUSCULAR; INTRAVENOUS
Status: DISCONTINUED | OUTPATIENT
Start: 2021-01-01 | End: 2021-09-08 | Stop reason: HOSPADM

## 2021-01-01 RX ORDER — BACITRACIN 500 UNIT/G
PACKET (EA) TOPICAL
Status: COMPLETED
Start: 2021-01-01 | End: 2021-01-01

## 2021-01-01 RX ORDER — HYDRALAZINE HYDROCHLORIDE 20 MG/ML
20 INJECTION INTRAMUSCULAR; INTRAVENOUS
Status: DISCONTINUED | OUTPATIENT
Start: 2021-01-01 | End: 2021-01-01

## 2021-01-01 RX ORDER — ACETAMINOPHEN 650 MG/1
650 SUPPOSITORY RECTAL
Status: COMPLETED | OUTPATIENT
Start: 2021-01-01 | End: 2021-01-01

## 2021-01-01 RX ADMIN — Medication 10 ML: at 08:23

## 2021-01-01 RX ADMIN — DIPHENHYDRAMINE HYDROCHLORIDE 25 MG: 50 INJECTION, SOLUTION INTRAMUSCULAR; INTRAVENOUS at 16:00

## 2021-01-01 RX ADMIN — ACETAMINOPHEN 650 MG: 325 TABLET ORAL at 14:13

## 2021-01-01 RX ADMIN — PIPERACILLIN AND TAZOBACTAM 3.38 G: 3; .375 INJECTION, POWDER, LYOPHILIZED, FOR SOLUTION INTRAVENOUS at 06:42

## 2021-01-01 RX ADMIN — PAROXETINE 20 MG: 20 TABLET, FILM COATED ORAL at 09:41

## 2021-01-01 RX ADMIN — CEPHALEXIN 500 MG: 500 CAPSULE ORAL at 21:55

## 2021-01-01 RX ADMIN — HEPARIN SODIUM 3800 UNITS: 1000 INJECTION, SOLUTION INTRAVENOUS; SUBCUTANEOUS at 13:41

## 2021-01-01 RX ADMIN — RANOLAZINE 1000 MG: 500 TABLET, FILM COATED, EXTENDED RELEASE ORAL at 10:27

## 2021-01-01 RX ADMIN — WARFARIN SODIUM 4 MG: 2 TABLET ORAL at 16:59

## 2021-01-01 RX ADMIN — CEPHALEXIN 250 MG: 250 CAPSULE ORAL at 09:30

## 2021-01-01 RX ADMIN — POLYETHYLENE GLYCOL 3350 17 G: 17 POWDER, FOR SOLUTION ORAL at 10:09

## 2021-01-01 RX ADMIN — HEPARIN SODIUM 1400 UNITS: 1000 INJECTION INTRAVENOUS; SUBCUTANEOUS at 19:42

## 2021-01-01 RX ADMIN — Medication 10 ML: at 23:02

## 2021-01-01 RX ADMIN — CALCIUM GLUCONATE 2 G: 20 INJECTION, SOLUTION INTRAVENOUS at 10:29

## 2021-01-01 RX ADMIN — RANOLAZINE 1000 MG: 500 TABLET, FILM COATED, EXTENDED RELEASE ORAL at 19:25

## 2021-01-01 RX ADMIN — WARFARIN SODIUM 2.5 MG: 2.5 TABLET ORAL at 17:44

## 2021-01-01 RX ADMIN — ARFORMOTEROL TARTRATE: 15 SOLUTION RESPIRATORY (INHALATION) at 07:17

## 2021-01-01 RX ADMIN — Medication 1 CAPSULE: at 16:32

## 2021-01-01 RX ADMIN — HYDRALAZINE HYDROCHLORIDE 100 MG: 50 TABLET, FILM COATED ORAL at 09:32

## 2021-01-01 RX ADMIN — Medication 10 ML: at 12:16

## 2021-01-01 RX ADMIN — PROPOFOL 35 MCG/KG/MIN: 10 INJECTION, EMULSION INTRAVENOUS at 02:07

## 2021-01-01 RX ADMIN — Medication 30 ML: at 07:16

## 2021-01-01 RX ADMIN — BRIVARACETAM 100 MG: 50 INJECTION, SUSPENSION INTRAVENOUS at 05:15

## 2021-01-01 RX ADMIN — Medication: at 10:28

## 2021-01-01 RX ADMIN — INSULIN GLARGINE 20 UNITS: 100 INJECTION, SOLUTION SUBCUTANEOUS at 09:58

## 2021-01-01 RX ADMIN — POTASSIUM CHLORIDE 20 MEQ: 750 TABLET, FILM COATED, EXTENDED RELEASE ORAL at 10:15

## 2021-01-01 RX ADMIN — CEPHALEXIN 250 MG: 250 CAPSULE ORAL at 10:16

## 2021-01-01 RX ADMIN — RANOLAZINE 1000 MG: 500 TABLET, FILM COATED, EXTENDED RELEASE ORAL at 17:34

## 2021-01-01 RX ADMIN — BUMETANIDE 2 MG: 0.25 INJECTION INTRAMUSCULAR; INTRAVENOUS at 12:37

## 2021-01-01 RX ADMIN — BUMETANIDE 1 MG/HR: 0.25 INJECTION INTRAMUSCULAR; INTRAVENOUS at 11:47

## 2021-01-01 RX ADMIN — Medication 10 ML: at 07:53

## 2021-01-01 RX ADMIN — ISOSORBIDE MONONITRATE 30 MG: 30 TABLET, EXTENDED RELEASE ORAL at 08:20

## 2021-01-01 RX ADMIN — FAMOTIDINE 20 MG: 20 TABLET ORAL at 08:40

## 2021-01-01 RX ADMIN — MICONAZOLE NITRATE 2 % TOPICAL POWDER: at 17:57

## 2021-01-01 RX ADMIN — RANOLAZINE 1000 MG: 500 TABLET, FILM COATED, EXTENDED RELEASE ORAL at 21:17

## 2021-01-01 RX ADMIN — ALBUMIN (HUMAN) 25 G: 12.5 INJECTION, SOLUTION INTRAVENOUS at 11:47

## 2021-01-01 RX ADMIN — ACETAMINOPHEN 650 MG: 325 TABLET ORAL at 01:13

## 2021-01-01 RX ADMIN — HEPARIN SODIUM 4000 UNITS: 1000 INJECTION INTRAVENOUS; SUBCUTANEOUS at 18:00

## 2021-01-01 RX ADMIN — RANOLAZINE 1000 MG: 500 TABLET, FILM COATED, EXTENDED RELEASE ORAL at 09:02

## 2021-01-01 RX ADMIN — HYDRALAZINE HYDROCHLORIDE 100 MG: 50 TABLET, FILM COATED ORAL at 17:01

## 2021-01-01 RX ADMIN — Medication 10 ML: at 21:49

## 2021-01-01 RX ADMIN — Medication 40 ML: at 13:04

## 2021-01-01 RX ADMIN — HYDROCODONE BITARTRATE AND ACETAMINOPHEN 1 TABLET: 5; 325 TABLET ORAL at 04:49

## 2021-01-01 RX ADMIN — PIPERACILLIN AND TAZOBACTAM 3.38 G: 3; .375 INJECTION, POWDER, LYOPHILIZED, FOR SOLUTION INTRAVENOUS at 18:35

## 2021-01-01 RX ADMIN — PROPOFOL 25 MCG/KG/MIN: 10 INJECTION, EMULSION INTRAVENOUS at 06:04

## 2021-01-01 RX ADMIN — CEPHALEXIN 250 MG: 250 CAPSULE ORAL at 09:20

## 2021-01-01 RX ADMIN — EPOETIN ALFA-EPBX 20000 UNITS: 20000 INJECTION, SOLUTION INTRAVENOUS; SUBCUTANEOUS at 23:10

## 2021-01-01 RX ADMIN — ISOSORBIDE MONONITRATE 30 MG: 30 TABLET ORAL at 09:41

## 2021-01-01 RX ADMIN — Medication: at 18:52

## 2021-01-01 RX ADMIN — PIPERACILLIN AND TAZOBACTAM 3.38 G: 3; .375 INJECTION, POWDER, LYOPHILIZED, FOR SOLUTION INTRAVENOUS at 18:04

## 2021-01-01 RX ADMIN — CEPHALEXIN 250 MG: 250 CAPSULE ORAL at 21:20

## 2021-01-01 RX ADMIN — Medication 10 ML: at 21:24

## 2021-01-01 RX ADMIN — BRIVARACETAM 100 MG: 50 INJECTION, SUSPENSION INTRAVENOUS at 19:38

## 2021-01-01 RX ADMIN — ISOSORBIDE MONONITRATE 30 MG: 30 TABLET ORAL at 10:51

## 2021-01-01 RX ADMIN — WATER 2 G: 1 INJECTION INTRAMUSCULAR; INTRAVENOUS; SUBCUTANEOUS at 13:00

## 2021-01-01 RX ADMIN — ISOSORBIDE MONONITRATE 30 MG: 30 TABLET ORAL at 08:36

## 2021-01-01 RX ADMIN — POLYETHYLENE GLYCOL 3350 17 G: 17 POWDER, FOR SOLUTION ORAL at 10:05

## 2021-01-01 RX ADMIN — HYDRALAZINE HYDROCHLORIDE 100 MG: 50 TABLET, FILM COATED ORAL at 17:51

## 2021-01-01 RX ADMIN — CEPHALEXIN 250 MG: 250 CAPSULE ORAL at 17:00

## 2021-01-01 RX ADMIN — Medication: at 08:39

## 2021-01-01 RX ADMIN — GABAPENTIN 200 MG: 100 CAPSULE ORAL at 12:28

## 2021-01-01 RX ADMIN — Medication: at 17:22

## 2021-01-01 RX ADMIN — Medication: at 08:57

## 2021-01-01 RX ADMIN — HYDRALAZINE HYDROCHLORIDE 100 MG: 50 TABLET, FILM COATED ORAL at 16:48

## 2021-01-01 RX ADMIN — CEPHALEXIN 250 MG: 250 CAPSULE ORAL at 17:51

## 2021-01-01 RX ADMIN — MICONAZOLE NITRATE 2 % TOPICAL POWDER: at 17:54

## 2021-01-01 RX ADMIN — PAROXETINE 20 MG: 20 TABLET, FILM COATED ORAL at 08:03

## 2021-01-01 RX ADMIN — BUDESONIDE AND FORMOTEROL FUMARATE DIHYDRATE 1 PUFF: 160; 4.5 AEROSOL RESPIRATORY (INHALATION) at 09:35

## 2021-01-01 RX ADMIN — HYDRALAZINE HYDROCHLORIDE 100 MG: 50 TABLET, FILM COATED ORAL at 22:26

## 2021-01-01 RX ADMIN — HYDRALAZINE HYDROCHLORIDE 100 MG: 50 TABLET, FILM COATED ORAL at 21:16

## 2021-01-01 RX ADMIN — RANOLAZINE 1000 MG: 500 TABLET, FILM COATED, EXTENDED RELEASE ORAL at 08:20

## 2021-01-01 RX ADMIN — MICONAZOLE NITRATE 2 % TOPICAL POWDER: at 09:54

## 2021-01-01 RX ADMIN — HYDRALAZINE HYDROCHLORIDE 100 MG: 50 TABLET, FILM COATED ORAL at 16:19

## 2021-01-01 RX ADMIN — Medication: at 08:03

## 2021-01-01 RX ADMIN — BUMETANIDE 2 MG: 0.25 INJECTION INTRAMUSCULAR; INTRAVENOUS at 08:48

## 2021-01-01 RX ADMIN — INSULIN GLARGINE 20 UNITS: 100 INJECTION, SOLUTION SUBCUTANEOUS at 20:59

## 2021-01-01 RX ADMIN — PAROXETINE 20 MG: 20 TABLET, FILM COATED ORAL at 10:15

## 2021-01-01 RX ADMIN — HYDROCORTISONE: 1 CREAM TOPICAL at 09:00

## 2021-01-01 RX ADMIN — FLUCONAZOLE 100 MG: 200 INJECTION, SOLUTION INTRAVENOUS at 12:46

## 2021-01-01 RX ADMIN — BUMETANIDE 2 MG: 0.25 INJECTION INTRAMUSCULAR; INTRAVENOUS at 22:31

## 2021-01-01 RX ADMIN — HEPARIN SODIUM 1900 UNITS: 1000 INJECTION INTRAVENOUS; SUBCUTANEOUS at 11:15

## 2021-01-01 RX ADMIN — WARFARIN SODIUM 4 MG: 1 TABLET ORAL at 17:29

## 2021-01-01 RX ADMIN — FAMOTIDINE 20 MG: 20 TABLET ORAL at 09:55

## 2021-01-01 RX ADMIN — BUMETANIDE 2 MG: 0.25 INJECTION INTRAMUSCULAR; INTRAVENOUS at 17:43

## 2021-01-01 RX ADMIN — CEPHALEXIN 500 MG: 500 CAPSULE ORAL at 00:02

## 2021-01-01 RX ADMIN — HYDRALAZINE HYDROCHLORIDE 100 MG: 50 TABLET, FILM COATED ORAL at 17:11

## 2021-01-01 RX ADMIN — ACETAMINOPHEN 650 MG: 325 TABLET, FILM COATED ORAL at 15:55

## 2021-01-01 RX ADMIN — MICONAZOLE NITRATE 2 % TOPICAL POWDER: at 17:29

## 2021-01-01 RX ADMIN — ACETAMINOPHEN 650 MG: 325 TABLET, FILM COATED ORAL at 00:09

## 2021-01-01 RX ADMIN — LEVETIRACETAM 250 MG: 250 TABLET, FILM COATED ORAL at 17:20

## 2021-01-01 RX ADMIN — Medication 10 ML: at 21:37

## 2021-01-01 RX ADMIN — IPRATROPIUM BROMIDE AND ALBUTEROL SULFATE 3 ML: .5; 3 SOLUTION RESPIRATORY (INHALATION) at 10:45

## 2021-01-01 RX ADMIN — PAROXETINE HYDROCHLORIDE 20 MG: 20 TABLET, FILM COATED ORAL at 09:56

## 2021-01-01 RX ADMIN — ALTEPLASE 0.5 MG: 2.2 INJECTION, POWDER, LYOPHILIZED, FOR SOLUTION INTRAVENOUS at 05:20

## 2021-01-01 RX ADMIN — IPRATROPIUM BROMIDE AND ALBUTEROL SULFATE 3 ML: .5; 3 SOLUTION RESPIRATORY (INHALATION) at 21:03

## 2021-01-01 RX ADMIN — INSULIN LISPRO 2 UNITS: 100 INJECTION, SOLUTION INTRAVENOUS; SUBCUTANEOUS at 17:24

## 2021-01-01 RX ADMIN — SODIUM CHLORIDE 100 MG: 9 INJECTION, SOLUTION INTRAVENOUS at 20:46

## 2021-01-01 RX ADMIN — BUDESONIDE AND FORMOTEROL FUMARATE DIHYDRATE 1 PUFF: 160; 4.5 AEROSOL RESPIRATORY (INHALATION) at 19:56

## 2021-01-01 RX ADMIN — IRON SUCROSE 200 MG: 20 INJECTION, SOLUTION INTRAVENOUS at 09:42

## 2021-01-01 RX ADMIN — ARFORMOTEROL TARTRATE: 15 SOLUTION RESPIRATORY (INHALATION) at 21:26

## 2021-01-01 RX ADMIN — ISOSORBIDE MONONITRATE 30 MG: 30 TABLET, EXTENDED RELEASE ORAL at 12:44

## 2021-01-01 RX ADMIN — HYDRALAZINE HYDROCHLORIDE 100 MG: 50 TABLET, FILM COATED ORAL at 17:48

## 2021-01-01 RX ADMIN — LEVOTHYROXINE SODIUM 25 MCG: 0.03 TABLET ORAL at 05:58

## 2021-01-01 RX ADMIN — POTASSIUM CHLORIDE 20 MEQ: 750 TABLET, FILM COATED, EXTENDED RELEASE ORAL at 08:36

## 2021-01-01 RX ADMIN — ISOSORBIDE MONONITRATE 30 MG: 30 TABLET, EXTENDED RELEASE ORAL at 17:13

## 2021-01-01 RX ADMIN — HYDRALAZINE HYDROCHLORIDE 100 MG: 50 TABLET, FILM COATED ORAL at 21:17

## 2021-01-01 RX ADMIN — HYDROCODONE BITARTRATE AND ACETAMINOPHEN 1 TABLET: 5; 325 TABLET ORAL at 00:23

## 2021-01-01 RX ADMIN — PROPOFOL 25 MCG/KG/MIN: 10 INJECTION, EMULSION INTRAVENOUS at 19:15

## 2021-01-01 RX ADMIN — CEFAZOLIN 1 G: 1 INJECTION, POWDER, FOR SOLUTION INTRAMUSCULAR; INTRAVENOUS at 13:00

## 2021-01-01 RX ADMIN — Medication 10 ML: at 17:18

## 2021-01-01 RX ADMIN — BUDESONIDE AND FORMOTEROL FUMARATE DIHYDRATE 1 PUFF: 160; 4.5 AEROSOL RESPIRATORY (INHALATION) at 07:26

## 2021-01-01 RX ADMIN — RANOLAZINE 1000 MG: 500 TABLET, FILM COATED, EXTENDED RELEASE ORAL at 09:58

## 2021-01-01 RX ADMIN — MICONAZOLE NITRATE 2 % TOPICAL POWDER: at 18:34

## 2021-01-01 RX ADMIN — EPOETIN ALFA-EPBX 20000 UNITS: 20000 INJECTION, SOLUTION INTRAVENOUS; SUBCUTANEOUS at 20:38

## 2021-01-01 RX ADMIN — POLYETHYLENE GLYCOL 3350 17 G: 17 POWDER, FOR SOLUTION ORAL at 16:18

## 2021-01-01 RX ADMIN — PAROXETINE HYDROCHLORIDE 20 MG: 20 TABLET, FILM COATED ORAL at 09:32

## 2021-01-01 RX ADMIN — ISOSORBIDE MONONITRATE 30 MG: 30 TABLET, EXTENDED RELEASE ORAL at 17:48

## 2021-01-01 RX ADMIN — Medication: at 08:26

## 2021-01-01 RX ADMIN — HYDRALAZINE HYDROCHLORIDE 100 MG: 50 TABLET, FILM COATED ORAL at 21:38

## 2021-01-01 RX ADMIN — GABAPENTIN 200 MG: 100 CAPSULE ORAL at 10:04

## 2021-01-01 RX ADMIN — Medication 10 ML: at 05:58

## 2021-01-01 RX ADMIN — CEPHALEXIN 500 MG: 500 CAPSULE ORAL at 08:07

## 2021-01-01 RX ADMIN — HYDRALAZINE HYDROCHLORIDE 100 MG: 50 TABLET, FILM COATED ORAL at 10:04

## 2021-01-01 RX ADMIN — ALBUMIN (HUMAN) 12.5 G: 0.25 INJECTION, SOLUTION INTRAVENOUS at 12:17

## 2021-01-01 RX ADMIN — Medication 10 ML: at 06:00

## 2021-01-01 RX ADMIN — BUMETANIDE 2 MG: 0.25 INJECTION INTRAMUSCULAR; INTRAVENOUS at 23:10

## 2021-01-01 RX ADMIN — HYDRALAZINE HYDROCHLORIDE 20 MG: 20 INJECTION INTRAMUSCULAR; INTRAVENOUS at 00:10

## 2021-01-01 RX ADMIN — RANOLAZINE 1000 MG: 500 TABLET, FILM COATED, EXTENDED RELEASE ORAL at 21:35

## 2021-01-01 RX ADMIN — INSULIN GLARGINE 20 UNITS: 100 INJECTION, SOLUTION SUBCUTANEOUS at 21:33

## 2021-01-01 RX ADMIN — INSULIN GLARGINE 20 UNITS: 100 INJECTION, SOLUTION SUBCUTANEOUS at 21:38

## 2021-01-01 RX ADMIN — IPRATROPIUM BROMIDE AND ALBUTEROL SULFATE 3 ML: .5; 3 SOLUTION RESPIRATORY (INHALATION) at 14:10

## 2021-01-01 RX ADMIN — SODIUM CHLORIDE 50 ML/HR: 9 INJECTION, SOLUTION INTRAVENOUS at 09:46

## 2021-01-01 RX ADMIN — FLUCONAZOLE 100 MG: 100 TABLET ORAL at 08:41

## 2021-01-01 RX ADMIN — ARFORMOTEROL TARTRATE: 15 SOLUTION RESPIRATORY (INHALATION) at 07:20

## 2021-01-01 RX ADMIN — EPOETIN ALFA-EPBX 20000 UNITS: 20000 INJECTION, SOLUTION INTRAVENOUS; SUBCUTANEOUS at 22:57

## 2021-01-01 RX ADMIN — HYDRALAZINE HYDROCHLORIDE 100 MG: 50 TABLET, FILM COATED ORAL at 17:00

## 2021-01-01 RX ADMIN — Medication 10 ML: at 06:52

## 2021-01-01 RX ADMIN — INSULIN GLARGINE 20 UNITS: 100 INJECTION, SOLUTION SUBCUTANEOUS at 21:41

## 2021-01-01 RX ADMIN — BUDESONIDE AND FORMOTEROL FUMARATE DIHYDRATE 1 PUFF: 160; 4.5 AEROSOL RESPIRATORY (INHALATION) at 19:52

## 2021-01-01 RX ADMIN — BRIVARACETAM 100 MG: 50 INJECTION, SUSPENSION INTRAVENOUS at 17:09

## 2021-01-01 RX ADMIN — CEPHALEXIN 250 MG: 250 CAPSULE ORAL at 12:39

## 2021-01-01 RX ADMIN — HYDRALAZINE HYDROCHLORIDE 100 MG: 50 TABLET, FILM COATED ORAL at 09:13

## 2021-01-01 RX ADMIN — HYDRALAZINE HYDROCHLORIDE 100 MG: 50 TABLET, FILM COATED ORAL at 17:14

## 2021-01-01 RX ADMIN — POLYETHYLENE GLYCOL 3350 17 G: 17 POWDER, FOR SOLUTION ORAL at 09:35

## 2021-01-01 RX ADMIN — RANOLAZINE 1000 MG: 500 TABLET, FILM COATED, EXTENDED RELEASE ORAL at 17:00

## 2021-01-01 RX ADMIN — THERA TABS 1 TABLET: TAB at 09:20

## 2021-01-01 RX ADMIN — INSULIN LISPRO 2 UNITS: 100 INJECTION, SOLUTION INTRAVENOUS; SUBCUTANEOUS at 11:52

## 2021-01-01 RX ADMIN — BUDESONIDE AND FORMOTEROL FUMARATE DIHYDRATE 1 PUFF: 160; 4.5 AEROSOL RESPIRATORY (INHALATION) at 09:00

## 2021-01-01 RX ADMIN — ARFORMOTEROL TARTRATE: 15 SOLUTION RESPIRATORY (INHALATION) at 21:05

## 2021-01-01 RX ADMIN — INSULIN GLARGINE 20 UNITS: 100 INJECTION, SOLUTION SUBCUTANEOUS at 09:41

## 2021-01-01 RX ADMIN — ISOSORBIDE MONONITRATE 30 MG: 30 TABLET, EXTENDED RELEASE ORAL at 10:27

## 2021-01-01 RX ADMIN — MICONAZOLE NITRATE 2 % TOPICAL POWDER: at 08:53

## 2021-01-01 RX ADMIN — BUMETANIDE 2 MG: 0.25 INJECTION INTRAMUSCULAR; INTRAVENOUS at 21:20

## 2021-01-01 RX ADMIN — Medication: at 08:38

## 2021-01-01 RX ADMIN — PIPERACILLIN AND TAZOBACTAM 3.38 G: 3; .375 INJECTION, POWDER, LYOPHILIZED, FOR SOLUTION INTRAVENOUS at 18:52

## 2021-01-01 RX ADMIN — MICONAZOLE NITRATE 2 % TOPICAL POWDER: at 17:44

## 2021-01-01 RX ADMIN — INSULIN GLARGINE 20 UNITS: 100 INJECTION, SOLUTION SUBCUTANEOUS at 21:00

## 2021-01-01 RX ADMIN — HEPARIN SODIUM 1100 UNITS: 1000 INJECTION INTRAVENOUS; SUBCUTANEOUS at 19:41

## 2021-01-01 RX ADMIN — Medication 10 ML: at 06:49

## 2021-01-01 RX ADMIN — GABAPENTIN 200 MG: 100 CAPSULE ORAL at 17:57

## 2021-01-01 RX ADMIN — HYDROCORTISONE: 1 CREAM TOPICAL at 18:00

## 2021-01-01 RX ADMIN — PAROXETINE HYDROCHLORIDE 20 MG: 20 TABLET, FILM COATED ORAL at 08:20

## 2021-01-01 RX ADMIN — INSULIN GLARGINE 12 UNITS: 100 INJECTION, SOLUTION SUBCUTANEOUS at 21:27

## 2021-01-01 RX ADMIN — Medication 1 CAPSULE: at 09:55

## 2021-01-01 RX ADMIN — Medication 10 ML: at 13:21

## 2021-01-01 RX ADMIN — THERA TABS 1 TABLET: TAB at 12:41

## 2021-01-01 RX ADMIN — HYDRALAZINE HYDROCHLORIDE 100 MG: 50 TABLET, FILM COATED ORAL at 22:07

## 2021-01-01 RX ADMIN — LIDOCAINE HYDROCHLORIDE 6 ML: 20 INJECTION, SOLUTION INFILTRATION; PERINEURAL at 13:00

## 2021-01-01 RX ADMIN — PIPERACILLIN AND TAZOBACTAM 3.38 G: 3; .375 INJECTION, POWDER, LYOPHILIZED, FOR SOLUTION INTRAVENOUS at 07:27

## 2021-01-01 RX ADMIN — Medication 10 ML: at 05:17

## 2021-01-01 RX ADMIN — HEPARIN SODIUM 1900 UNITS: 1000 INJECTION INTRAVENOUS; SUBCUTANEOUS at 15:42

## 2021-01-01 RX ADMIN — BISACODYL 10 MG: 10 SUPPOSITORY RECTAL at 15:20

## 2021-01-01 RX ADMIN — ACETAMINOPHEN 650 MG: 325 TABLET, FILM COATED ORAL at 23:27

## 2021-01-01 RX ADMIN — WARFARIN SODIUM 3 MG: 1 TABLET ORAL at 17:40

## 2021-01-01 RX ADMIN — RANOLAZINE 1000 MG: 500 TABLET, FILM COATED, EXTENDED RELEASE ORAL at 11:54

## 2021-01-01 RX ADMIN — PAROXETINE HYDROCHLORIDE 10 MG: 20 TABLET, FILM COATED ORAL at 09:16

## 2021-01-01 RX ADMIN — FAMOTIDINE 20 MG: 20 TABLET ORAL at 09:46

## 2021-01-01 RX ADMIN — Medication 40 ML: at 15:28

## 2021-01-01 RX ADMIN — HEPARIN SODIUM 2500 UNITS: 1000 INJECTION, SOLUTION INTRAVENOUS; SUBCUTANEOUS at 13:00

## 2021-01-01 RX ADMIN — Medication 40 ML: at 16:33

## 2021-01-01 RX ADMIN — RANOLAZINE 1000 MG: 500 TABLET, FILM COATED, EXTENDED RELEASE ORAL at 17:49

## 2021-01-01 RX ADMIN — Medication 10 ML: at 16:19

## 2021-01-01 RX ADMIN — PAROXETINE 20 MG: 20 TABLET, FILM COATED ORAL at 10:51

## 2021-01-01 RX ADMIN — WARFARIN SODIUM 3 MG: 2 TABLET ORAL at 21:37

## 2021-01-01 RX ADMIN — PIPERACILLIN AND TAZOBACTAM 3.38 G: 3; .375 INJECTION, POWDER, LYOPHILIZED, FOR SOLUTION INTRAVENOUS at 18:16

## 2021-01-01 RX ADMIN — RANOLAZINE 1000 MG: 500 TABLET, FILM COATED, EXTENDED RELEASE ORAL at 16:59

## 2021-01-01 RX ADMIN — Medication 10 ML: at 06:06

## 2021-01-01 RX ADMIN — DEXTROSE MONOHYDRATE 25 G: 25 INJECTION, SOLUTION INTRAVENOUS at 22:46

## 2021-01-01 RX ADMIN — CEPHALEXIN 250 MG: 250 CAPSULE ORAL at 17:50

## 2021-01-01 RX ADMIN — Medication 10 ML: at 06:29

## 2021-01-01 RX ADMIN — THERA TABS 1 TABLET: TAB at 09:35

## 2021-01-01 RX ADMIN — CEPHALEXIN 250 MG: 250 CAPSULE ORAL at 08:25

## 2021-01-01 RX ADMIN — RANOLAZINE 1000 MG: 500 TABLET, FILM COATED, EXTENDED RELEASE ORAL at 21:38

## 2021-01-01 RX ADMIN — BUMETANIDE 2 MG: 0.25 INJECTION INTRAMUSCULAR; INTRAVENOUS at 21:25

## 2021-01-01 RX ADMIN — HYDRALAZINE HYDROCHLORIDE 100 MG: 50 TABLET, FILM COATED ORAL at 17:15

## 2021-01-01 RX ADMIN — HYDROCODONE BITARTRATE AND ACETAMINOPHEN 1 TABLET: 5; 325 TABLET ORAL at 08:40

## 2021-01-01 RX ADMIN — POTASSIUM CHLORIDE 20 MEQ: 750 TABLET, FILM COATED, EXTENDED RELEASE ORAL at 12:41

## 2021-01-01 RX ADMIN — Medication 10 ML: at 04:46

## 2021-01-01 RX ADMIN — HYDRALAZINE HYDROCHLORIDE 100 MG: 50 TABLET, FILM COATED ORAL at 21:25

## 2021-01-01 RX ADMIN — BUDESONIDE AND FORMOTEROL FUMARATE DIHYDRATE 1 PUFF: 160; 4.5 AEROSOL RESPIRATORY (INHALATION) at 21:00

## 2021-01-01 RX ADMIN — CEPHALEXIN 500 MG: 500 CAPSULE ORAL at 21:44

## 2021-01-01 RX ADMIN — Medication 1 CAPSULE: at 09:16

## 2021-01-01 RX ADMIN — MICONAZOLE NITRATE 2 % TOPICAL POWDER: at 09:23

## 2021-01-01 RX ADMIN — Medication 10 ML: at 17:40

## 2021-01-01 RX ADMIN — BACITRACIN 1 PACKET: 500 OINTMENT TOPICAL at 13:14

## 2021-01-01 RX ADMIN — POLYETHYLENE GLYCOL 3350 17 G: 17 POWDER, FOR SOLUTION ORAL at 09:00

## 2021-01-01 RX ADMIN — BUMETANIDE 1 MG: 0.25 INJECTION, SOLUTION INTRAMUSCULAR; INTRAVENOUS at 08:05

## 2021-01-01 RX ADMIN — Medication 10 ML: at 05:16

## 2021-01-01 RX ADMIN — ISOSORBIDE MONONITRATE 30 MG: 30 TABLET, EXTENDED RELEASE ORAL at 16:59

## 2021-01-01 RX ADMIN — SODIUM CHLORIDE 100 MG: 9 INJECTION, SOLUTION INTRAVENOUS at 20:40

## 2021-01-01 RX ADMIN — RANOLAZINE 1000 MG: 500 TABLET, FILM COATED, EXTENDED RELEASE ORAL at 10:15

## 2021-01-01 RX ADMIN — Medication 10 ML: at 05:11

## 2021-01-01 RX ADMIN — Medication 10 ML: at 21:35

## 2021-01-01 RX ADMIN — HYDRALAZINE HYDROCHLORIDE 100 MG: 50 TABLET, FILM COATED ORAL at 08:38

## 2021-01-01 RX ADMIN — Medication 10 ML: at 21:38

## 2021-01-01 RX ADMIN — POLYETHYLENE GLYCOL 3350 17 G: 17 POWDER, FOR SOLUTION ORAL at 12:46

## 2021-01-01 RX ADMIN — WARFARIN SODIUM 4 MG: 1 TABLET ORAL at 17:49

## 2021-01-01 RX ADMIN — MICONAZOLE NITRATE 2 % TOPICAL POWDER: at 08:10

## 2021-01-01 RX ADMIN — LACTULOSE 30 ML: 20 SOLUTION ORAL at 18:41

## 2021-01-01 RX ADMIN — LACTULOSE 30 ML: 20 SOLUTION ORAL at 08:38

## 2021-01-01 RX ADMIN — MICONAZOLE NITRATE 2 % TOPICAL POWDER: at 08:19

## 2021-01-01 RX ADMIN — Medication: at 09:00

## 2021-01-01 RX ADMIN — RANOLAZINE 1000 MG: 500 TABLET, FILM COATED, EXTENDED RELEASE ORAL at 10:33

## 2021-01-01 RX ADMIN — CEPHALEXIN 250 MG: 250 CAPSULE ORAL at 21:30

## 2021-01-01 RX ADMIN — IPRATROPIUM BROMIDE AND ALBUTEROL SULFATE 3 ML: .5; 3 SOLUTION RESPIRATORY (INHALATION) at 10:43

## 2021-01-01 RX ADMIN — LEVOTHYROXINE SODIUM 25 MCG: 0.03 TABLET ORAL at 06:58

## 2021-01-01 RX ADMIN — PIPERACILLIN AND TAZOBACTAM 3.38 G: 3; .375 INJECTION, POWDER, LYOPHILIZED, FOR SOLUTION INTRAVENOUS at 07:54

## 2021-01-01 RX ADMIN — HYDRALAZINE HYDROCHLORIDE 100 MG: 50 TABLET, FILM COATED ORAL at 08:52

## 2021-01-01 RX ADMIN — RANOLAZINE 1000 MG: 500 TABLET, FILM COATED, EXTENDED RELEASE ORAL at 09:28

## 2021-01-01 RX ADMIN — RANOLAZINE 1000 MG: 500 TABLET, FILM COATED, EXTENDED RELEASE ORAL at 17:22

## 2021-01-01 RX ADMIN — IPRATROPIUM BROMIDE AND ALBUTEROL SULFATE 3 ML: .5; 3 SOLUTION RESPIRATORY (INHALATION) at 04:37

## 2021-01-01 RX ADMIN — Medication: at 17:14

## 2021-01-01 RX ADMIN — Medication 10 ML: at 07:09

## 2021-01-01 RX ADMIN — PAROXETINE HYDROCHLORIDE 20 MG: 20 TABLET, FILM COATED ORAL at 09:07

## 2021-01-01 RX ADMIN — PIPERACILLIN AND TAZOBACTAM 3.38 G: 3; .375 INJECTION, POWDER, LYOPHILIZED, FOR SOLUTION INTRAVENOUS at 18:40

## 2021-01-01 RX ADMIN — ACETAMINOPHEN 650 MG: 325 TABLET, FILM COATED ORAL at 11:38

## 2021-01-01 RX ADMIN — FAMOTIDINE 20 MG: 10 INJECTION, SOLUTION INTRAVENOUS at 08:28

## 2021-01-01 RX ADMIN — Medication 20 ML: at 06:26

## 2021-01-01 RX ADMIN — ACETAMINOPHEN 650 MG: 650 SUPPOSITORY RECTAL at 02:41

## 2021-01-01 RX ADMIN — MICONAZOLE NITRATE 2 % TOPICAL POWDER: at 17:09

## 2021-01-01 RX ADMIN — RANOLAZINE 1000 MG: 500 TABLET, FILM COATED, EXTENDED RELEASE ORAL at 19:16

## 2021-01-01 RX ADMIN — WARFARIN SODIUM 4 MG: 1 TABLET ORAL at 17:15

## 2021-01-01 RX ADMIN — HYDROCODONE BITARTRATE AND ACETAMINOPHEN 1 TABLET: 5; 325 TABLET ORAL at 23:37

## 2021-01-01 RX ADMIN — BUMETANIDE 1 MG/HR: 0.25 INJECTION INTRAMUSCULAR; INTRAVENOUS at 12:17

## 2021-01-01 RX ADMIN — RANOLAZINE 1000 MG: 500 TABLET, FILM COATED, EXTENDED RELEASE ORAL at 18:21

## 2021-01-01 RX ADMIN — POTASSIUM CHLORIDE 20 MEQ: 750 TABLET, FILM COATED, EXTENDED RELEASE ORAL at 09:35

## 2021-01-01 RX ADMIN — PIPERACILLIN AND TAZOBACTAM 3.38 G: 3; .375 INJECTION, POWDER, LYOPHILIZED, FOR SOLUTION INTRAVENOUS at 06:22

## 2021-01-01 RX ADMIN — PAROXETINE 20 MG: 20 TABLET, FILM COATED ORAL at 08:36

## 2021-01-01 RX ADMIN — CEPHALEXIN 250 MG: 250 CAPSULE ORAL at 09:55

## 2021-01-01 RX ADMIN — FENTANYL CITRATE 25 MCG: 50 INJECTION, SOLUTION INTRAMUSCULAR; INTRAVENOUS at 10:51

## 2021-01-01 RX ADMIN — Medication: at 10:35

## 2021-01-01 RX ADMIN — RANOLAZINE 1000 MG: 500 TABLET, FILM COATED, EXTENDED RELEASE ORAL at 11:00

## 2021-01-01 RX ADMIN — THERA TABS 1 TABLET: TAB at 09:00

## 2021-01-01 RX ADMIN — Medication 10 ML: at 21:28

## 2021-01-01 RX ADMIN — SODIUM CHLORIDE 100 MG: 9 INJECTION, SOLUTION INTRAVENOUS at 18:40

## 2021-01-01 RX ADMIN — ACETAMINOPHEN 650 MG: 325 TABLET, FILM COATED ORAL at 01:13

## 2021-01-01 RX ADMIN — HYDRALAZINE HYDROCHLORIDE 100 MG: 50 TABLET, FILM COATED ORAL at 22:14

## 2021-01-01 RX ADMIN — CEPHALEXIN 500 MG: 500 CAPSULE ORAL at 11:59

## 2021-01-01 RX ADMIN — RANOLAZINE 1000 MG: 500 TABLET, FILM COATED, EXTENDED RELEASE ORAL at 20:37

## 2021-01-01 RX ADMIN — RANOLAZINE 1000 MG: 500 TABLET, FILM COATED, EXTENDED RELEASE ORAL at 10:05

## 2021-01-01 RX ADMIN — WARFARIN SODIUM 2.5 MG: 2.5 TABLET ORAL at 17:51

## 2021-01-01 RX ADMIN — INSULIN GLARGINE 20 UNITS: 100 INJECTION, SOLUTION SUBCUTANEOUS at 08:01

## 2021-01-01 RX ADMIN — Medication 10 ML: at 06:31

## 2021-01-01 RX ADMIN — Medication 10 ML: at 06:19

## 2021-01-01 RX ADMIN — IRON SUCROSE 200 MG: 20 INJECTION, SOLUTION INTRAVENOUS at 09:50

## 2021-01-01 RX ADMIN — POTASSIUM CHLORIDE 20 MEQ: 750 TABLET, FILM COATED, EXTENDED RELEASE ORAL at 10:03

## 2021-01-01 RX ADMIN — ARFORMOTEROL TARTRATE: 15 SOLUTION RESPIRATORY (INHALATION) at 08:46

## 2021-01-01 RX ADMIN — PIPERACILLIN AND TAZOBACTAM 3.38 G: 3; .375 INJECTION, POWDER, LYOPHILIZED, FOR SOLUTION INTRAVENOUS at 18:45

## 2021-01-01 RX ADMIN — EPOETIN ALFA-EPBX 20000 UNITS: 20000 INJECTION, SOLUTION INTRAVENOUS; SUBCUTANEOUS at 21:30

## 2021-01-01 RX ADMIN — INSULIN GLARGINE 20 UNITS: 100 INJECTION, SOLUTION SUBCUTANEOUS at 08:52

## 2021-01-01 RX ADMIN — THERA TABS 1 TABLET: TAB at 10:04

## 2021-01-01 RX ADMIN — WARFARIN SODIUM 4 MG: 4 TABLET ORAL at 18:04

## 2021-01-01 RX ADMIN — Medication 10 ML: at 12:38

## 2021-01-01 RX ADMIN — HYDROCODONE BITARTRATE AND ACETAMINOPHEN 1 TABLET: 5; 325 TABLET ORAL at 17:40

## 2021-01-01 RX ADMIN — ISOSORBIDE MONONITRATE 30 MG: 30 TABLET ORAL at 12:38

## 2021-01-01 RX ADMIN — Medication 10 ML: at 07:07

## 2021-01-01 RX ADMIN — ACETAMINOPHEN 650 MG: 325 TABLET, FILM COATED ORAL at 21:18

## 2021-01-01 RX ADMIN — Medication 10 ML: at 13:17

## 2021-01-01 RX ADMIN — HYDRALAZINE HYDROCHLORIDE 100 MG: 50 TABLET, FILM COATED ORAL at 21:26

## 2021-01-01 RX ADMIN — Medication 10 ML: at 21:48

## 2021-01-01 RX ADMIN — FENTANYL CITRATE 25 MCG: 50 INJECTION, SOLUTION INTRAMUSCULAR; INTRAVENOUS at 13:29

## 2021-01-01 RX ADMIN — HYDRALAZINE HYDROCHLORIDE 100 MG: 50 TABLET, FILM COATED ORAL at 08:36

## 2021-01-01 RX ADMIN — LACTULOSE 30 ML: 20 SOLUTION ORAL at 09:13

## 2021-01-01 RX ADMIN — Medication: at 09:46

## 2021-01-01 RX ADMIN — POLYETHYLENE GLYCOL 3350 17 G: 17 POWDER, FOR SOLUTION ORAL at 09:58

## 2021-01-01 RX ADMIN — RANOLAZINE 1000 MG: 500 TABLET, FILM COATED, EXTENDED RELEASE ORAL at 10:09

## 2021-01-01 RX ADMIN — DEXMEDETOMIDINE HYDROCHLORIDE 0.8 MCG/KG/HR: 400 INJECTION INTRAVENOUS at 12:38

## 2021-01-01 RX ADMIN — Medication 10 ML: at 15:21

## 2021-01-01 RX ADMIN — WARFARIN SODIUM 2.5 MG: 2.5 TABLET ORAL at 17:13

## 2021-01-01 RX ADMIN — WARFARIN SODIUM 4 MG: 1 TABLET ORAL at 18:42

## 2021-01-01 RX ADMIN — AMIODARONE HYDROCHLORIDE 1 MG/MIN: 50 INJECTION, SOLUTION INTRAVENOUS at 10:38

## 2021-01-01 RX ADMIN — ACETAMINOPHEN 650 MG: 325 TABLET, FILM COATED ORAL at 11:03

## 2021-01-01 RX ADMIN — Medication 10 ML: at 21:27

## 2021-01-01 RX ADMIN — CEPHALEXIN 250 MG: 250 CAPSULE ORAL at 11:54

## 2021-01-01 RX ADMIN — Medication: at 08:25

## 2021-01-01 RX ADMIN — Medication: at 08:28

## 2021-01-01 RX ADMIN — Medication 10 ML: at 12:39

## 2021-01-01 RX ADMIN — BUMETANIDE 2 MG: 0.25 INJECTION INTRAMUSCULAR; INTRAVENOUS at 15:09

## 2021-01-01 RX ADMIN — BUMETANIDE 1 MG: 0.25 INJECTION, SOLUTION INTRAMUSCULAR; INTRAVENOUS at 17:28

## 2021-01-01 RX ADMIN — FAMOTIDINE 20 MG: 20 TABLET ORAL at 08:25

## 2021-01-01 RX ADMIN — CEPHALEXIN 250 MG: 250 CAPSULE ORAL at 21:36

## 2021-01-01 RX ADMIN — INSULIN GLARGINE 20 UNITS: 100 INJECTION, SOLUTION SUBCUTANEOUS at 10:51

## 2021-01-01 RX ADMIN — Medication: at 08:05

## 2021-01-01 RX ADMIN — INSULIN GLARGINE 20 UNITS: 100 INJECTION, SOLUTION SUBCUTANEOUS at 09:54

## 2021-01-01 RX ADMIN — PROPOFOL 30 MCG/KG/MIN: 10 INJECTION, EMULSION INTRAVENOUS at 06:23

## 2021-01-01 RX ADMIN — ALBUMIN (HUMAN) 12.5 G: 12.5 INJECTION, SOLUTION INTRAVENOUS at 16:42

## 2021-01-01 RX ADMIN — FAMOTIDINE 20 MG: 20 TABLET ORAL at 10:27

## 2021-01-01 RX ADMIN — ISOSORBIDE MONONITRATE 30 MG: 30 TABLET ORAL at 09:35

## 2021-01-01 RX ADMIN — HYDRALAZINE HYDROCHLORIDE 10 MG: 20 INJECTION INTRAMUSCULAR; INTRAVENOUS at 03:57

## 2021-01-01 RX ADMIN — HYDRALAZINE HYDROCHLORIDE 100 MG: 50 TABLET, FILM COATED ORAL at 17:57

## 2021-01-01 RX ADMIN — CEPHALEXIN 250 MG: 250 CAPSULE ORAL at 09:34

## 2021-01-01 RX ADMIN — HYDROCORTISONE: 1 CREAM TOPICAL at 10:06

## 2021-01-01 RX ADMIN — POTASSIUM CHLORIDE 20 MEQ: 750 TABLET, FILM COATED, EXTENDED RELEASE ORAL at 12:39

## 2021-01-01 RX ADMIN — DEXMEDETOMIDINE HYDROCHLORIDE 1 MCG/KG/HR: 400 INJECTION INTRAVENOUS at 23:34

## 2021-01-01 RX ADMIN — RANOLAZINE 1000 MG: 500 TABLET, FILM COATED, EXTENDED RELEASE ORAL at 22:14

## 2021-01-01 RX ADMIN — RANOLAZINE 1000 MG: 500 TABLET, FILM COATED, EXTENDED RELEASE ORAL at 19:03

## 2021-01-01 RX ADMIN — RANOLAZINE 1000 MG: 500 TABLET, FILM COATED, EXTENDED RELEASE ORAL at 18:01

## 2021-01-01 RX ADMIN — HYDRALAZINE HYDROCHLORIDE 100 MG: 50 TABLET, FILM COATED ORAL at 09:58

## 2021-01-01 RX ADMIN — CEPHALEXIN 250 MG: 250 CAPSULE ORAL at 09:46

## 2021-01-01 RX ADMIN — PROPOFOL 40 MCG/KG/MIN: 10 INJECTION, EMULSION INTRAVENOUS at 12:11

## 2021-01-01 RX ADMIN — HYDRALAZINE HYDROCHLORIDE 100 MG: 50 TABLET, FILM COATED ORAL at 16:33

## 2021-01-01 RX ADMIN — MICONAZOLE NITRATE 2 % TOPICAL POWDER: at 10:52

## 2021-01-01 RX ADMIN — PAROXETINE 20 MG: 20 TABLET, FILM COATED ORAL at 10:04

## 2021-01-01 RX ADMIN — CEPHALEXIN 250 MG: 250 CAPSULE ORAL at 20:27

## 2021-01-01 RX ADMIN — RANOLAZINE 1000 MG: 500 TABLET, FILM COATED, EXTENDED RELEASE ORAL at 12:54

## 2021-01-01 RX ADMIN — BUMETANIDE 2 MG: 1 TABLET ORAL at 17:11

## 2021-01-01 RX ADMIN — INSULIN GLARGINE 20 UNITS: 100 INJECTION, SOLUTION SUBCUTANEOUS at 21:19

## 2021-01-01 RX ADMIN — CEPHALEXIN 250 MG: 250 CAPSULE ORAL at 17:09

## 2021-01-01 RX ADMIN — RANOLAZINE 1000 MG: 500 TABLET, FILM COATED, EXTENDED RELEASE ORAL at 09:46

## 2021-01-01 RX ADMIN — ALBUMIN (HUMAN) 12.5 G: 12.5 INJECTION, SOLUTION INTRAVENOUS at 12:59

## 2021-01-01 RX ADMIN — BUMETANIDE 2 MG: 1 TABLET ORAL at 10:01

## 2021-01-01 RX ADMIN — BUDESONIDE AND FORMOTEROL FUMARATE DIHYDRATE 1 PUFF: 160; 4.5 AEROSOL RESPIRATORY (INHALATION) at 20:46

## 2021-01-01 RX ADMIN — RANOLAZINE 1000 MG: 500 TABLET, FILM COATED, EXTENDED RELEASE ORAL at 09:36

## 2021-01-01 RX ADMIN — HYDRALAZINE HYDROCHLORIDE 100 MG: 50 TABLET, FILM COATED ORAL at 08:41

## 2021-01-01 RX ADMIN — Medication 10 ML: at 21:39

## 2021-01-01 RX ADMIN — WARFARIN SODIUM 2 MG: 2 TABLET ORAL at 16:42

## 2021-01-01 RX ADMIN — ARFORMOTEROL TARTRATE: 15 SOLUTION RESPIRATORY (INHALATION) at 08:37

## 2021-01-01 RX ADMIN — ALBUMIN (HUMAN) 12.5 G: 12.5 INJECTION, SOLUTION INTRAVENOUS at 08:41

## 2021-01-01 RX ADMIN — RANOLAZINE 1000 MG: 500 TABLET, FILM COATED, EXTENDED RELEASE ORAL at 10:07

## 2021-01-01 RX ADMIN — Medication: at 08:42

## 2021-01-01 RX ADMIN — CEPHALEXIN 250 MG: 250 CAPSULE ORAL at 10:51

## 2021-01-01 RX ADMIN — SODIUM CHLORIDE 100 ML/HR: 9 INJECTION, SOLUTION INTRAVENOUS at 14:58

## 2021-01-01 RX ADMIN — HYDRALAZINE HYDROCHLORIDE 100 MG: 50 TABLET, FILM COATED ORAL at 09:59

## 2021-01-01 RX ADMIN — SODIUM CHLORIDE 500 ML: 9 INJECTION, SOLUTION INTRAVENOUS at 03:26

## 2021-01-01 RX ADMIN — RANOLAZINE 1000 MG: 500 TABLET, FILM COATED, EXTENDED RELEASE ORAL at 17:09

## 2021-01-01 RX ADMIN — INSULIN GLARGINE 20 UNITS: 100 INJECTION, SOLUTION SUBCUTANEOUS at 22:28

## 2021-01-01 RX ADMIN — ARFORMOTEROL TARTRATE: 15 SOLUTION RESPIRATORY (INHALATION) at 10:30

## 2021-01-01 RX ADMIN — BUMETANIDE 2 MG: 0.25 INJECTION INTRAMUSCULAR; INTRAVENOUS at 15:20

## 2021-01-01 RX ADMIN — Medication: at 08:43

## 2021-01-01 RX ADMIN — MICONAZOLE NITRATE 2 % TOPICAL POWDER: at 09:58

## 2021-01-01 RX ADMIN — Medication 10 ML: at 22:48

## 2021-01-01 RX ADMIN — RANOLAZINE 1000 MG: 500 TABLET, FILM COATED, EXTENDED RELEASE ORAL at 21:47

## 2021-01-01 RX ADMIN — ISOSORBIDE MONONITRATE 30 MG: 30 TABLET, EXTENDED RELEASE ORAL at 08:25

## 2021-01-01 RX ADMIN — Medication 40 ML: at 13:11

## 2021-01-01 RX ADMIN — HYDRALAZINE HYDROCHLORIDE 100 MG: 50 TABLET, FILM COATED ORAL at 15:55

## 2021-01-01 RX ADMIN — PAROXETINE 20 MG: 20 TABLET, FILM COATED ORAL at 09:13

## 2021-01-01 RX ADMIN — IPRATROPIUM BROMIDE AND ALBUTEROL SULFATE 3 ML: .5; 3 SOLUTION RESPIRATORY (INHALATION) at 20:01

## 2021-01-01 RX ADMIN — INSULIN GLARGINE 20 UNITS: 100 INJECTION, SOLUTION SUBCUTANEOUS at 09:51

## 2021-01-01 RX ADMIN — ISOSORBIDE MONONITRATE 30 MG: 30 TABLET, EXTENDED RELEASE ORAL at 09:45

## 2021-01-01 RX ADMIN — WARFARIN SODIUM 4 MG: 4 TABLET ORAL at 17:49

## 2021-01-01 RX ADMIN — HYDRALAZINE HYDROCHLORIDE 100 MG: 50 TABLET, FILM COATED ORAL at 21:12

## 2021-01-01 RX ADMIN — RANOLAZINE 1000 MG: 500 TABLET, FILM COATED, EXTENDED RELEASE ORAL at 08:38

## 2021-01-01 RX ADMIN — MICONAZOLE NITRATE 2 % TOPICAL POWDER: at 10:04

## 2021-01-01 RX ADMIN — Medication: at 09:56

## 2021-01-01 RX ADMIN — CHLOROTHIAZIDE SODIUM 500 MG: 500 INJECTION, POWDER, LYOPHILIZED, FOR SOLUTION INTRAVENOUS at 17:53

## 2021-01-01 RX ADMIN — Medication 100 MCG/HR: at 06:18

## 2021-01-01 RX ADMIN — THERA TABS 1 TABLET: TAB at 09:40

## 2021-01-01 RX ADMIN — BUMETANIDE 2 MG: 0.25 INJECTION INTRAMUSCULAR; INTRAVENOUS at 21:17

## 2021-01-01 RX ADMIN — DEXMEDETOMIDINE HYDROCHLORIDE 0.3 MCG/KG/HR: 400 INJECTION INTRAVENOUS at 20:47

## 2021-01-01 RX ADMIN — ACETAMINOPHEN 650 MG: 325 TABLET, FILM COATED ORAL at 09:58

## 2021-01-01 RX ADMIN — HYDRALAZINE HYDROCHLORIDE 100 MG: 50 TABLET, FILM COATED ORAL at 22:23

## 2021-01-01 RX ADMIN — ACETAMINOPHEN 650 MG: 325 TABLET, FILM COATED ORAL at 07:11

## 2021-01-01 RX ADMIN — ALBUMIN (HUMAN) 12.5 G: 12.5 INJECTION, SOLUTION INTRAVENOUS at 21:20

## 2021-01-01 RX ADMIN — BUDESONIDE AND FORMOTEROL FUMARATE DIHYDRATE 1 PUFF: 160; 4.5 AEROSOL RESPIRATORY (INHALATION) at 20:48

## 2021-01-01 RX ADMIN — Medication: at 12:00

## 2021-01-01 RX ADMIN — PAROXETINE 20 MG: 20 TABLET, FILM COATED ORAL at 09:00

## 2021-01-01 RX ADMIN — ISOSORBIDE MONONITRATE 30 MG: 30 TABLET, EXTENDED RELEASE ORAL at 09:16

## 2021-01-01 RX ADMIN — ACETAMINOPHEN 650 MG: 325 TABLET, FILM COATED ORAL at 08:23

## 2021-01-01 RX ADMIN — ALBUMIN (HUMAN) 12.5 G: 12.5 INJECTION, SOLUTION INTRAVENOUS at 01:02

## 2021-01-01 RX ADMIN — ALBUMIN (HUMAN) 12.5 G: 12.5 INJECTION, SOLUTION INTRAVENOUS at 12:06

## 2021-01-01 RX ADMIN — HYDRALAZINE HYDROCHLORIDE 100 MG: 50 TABLET, FILM COATED ORAL at 09:41

## 2021-01-01 RX ADMIN — Medication: at 22:08

## 2021-01-01 RX ADMIN — IRON SUCROSE 200 MG: 20 INJECTION, SOLUTION INTRAVENOUS at 09:54

## 2021-01-01 RX ADMIN — WARFARIN SODIUM 2.5 MG: 2.5 TABLET ORAL at 16:32

## 2021-01-01 RX ADMIN — HEPARIN SODIUM 1900 UNITS: 1000 INJECTION INTRAVENOUS; SUBCUTANEOUS at 15:21

## 2021-01-01 RX ADMIN — WARFARIN SODIUM 4 MG: 4 TABLET ORAL at 17:09

## 2021-01-01 RX ADMIN — HYDRALAZINE HYDROCHLORIDE 100 MG: 50 TABLET, FILM COATED ORAL at 15:40

## 2021-01-01 RX ADMIN — Medication 10 ML: at 14:05

## 2021-01-01 RX ADMIN — ISOSORBIDE MONONITRATE 30 MG: 30 TABLET, EXTENDED RELEASE ORAL at 09:56

## 2021-01-01 RX ADMIN — PROPOFOL 35 MCG/KG/MIN: 10 INJECTION, EMULSION INTRAVENOUS at 09:43

## 2021-01-01 RX ADMIN — INSULIN GLARGINE 20 UNITS: 100 INJECTION, SOLUTION SUBCUTANEOUS at 08:00

## 2021-01-01 RX ADMIN — BUMETANIDE 2 MG: 0.25 INJECTION INTRAMUSCULAR; INTRAVENOUS at 09:52

## 2021-01-01 RX ADMIN — ACETAMINOPHEN 650 MG: 325 TABLET, FILM COATED ORAL at 07:14

## 2021-01-01 RX ADMIN — Medication 10 ML: at 06:37

## 2021-01-01 RX ADMIN — BUMETANIDE 2 MG: 1 TABLET ORAL at 12:39

## 2021-01-01 RX ADMIN — FAMOTIDINE 20 MG: 10 INJECTION, SOLUTION INTRAVENOUS at 08:45

## 2021-01-01 RX ADMIN — INSULIN GLARGINE 20 UNITS: 100 INJECTION, SOLUTION SUBCUTANEOUS at 21:25

## 2021-01-01 RX ADMIN — Medication: at 20:26

## 2021-01-01 RX ADMIN — INSULIN GLARGINE 20 UNITS: 100 INJECTION, SOLUTION SUBCUTANEOUS at 08:05

## 2021-01-01 RX ADMIN — BUDESONIDE AND FORMOTEROL FUMARATE DIHYDRATE 1 PUFF: 160; 4.5 AEROSOL RESPIRATORY (INHALATION) at 21:02

## 2021-01-01 RX ADMIN — THERA TABS 1 TABLET: TAB at 09:13

## 2021-01-01 RX ADMIN — ONDANSETRON 4 MG: 2 INJECTION INTRAMUSCULAR; INTRAVENOUS at 17:10

## 2021-01-01 RX ADMIN — HYDRALAZINE HYDROCHLORIDE 100 MG: 50 TABLET, FILM COATED ORAL at 16:32

## 2021-01-01 RX ADMIN — THERA TABS 1 TABLET: TAB at 10:01

## 2021-01-01 RX ADMIN — RANOLAZINE 1000 MG: 500 TABLET, FILM COATED, EXTENDED RELEASE ORAL at 09:22

## 2021-01-01 RX ADMIN — HYDRALAZINE HYDROCHLORIDE 100 MG: 50 TABLET, FILM COATED ORAL at 08:20

## 2021-01-01 RX ADMIN — HEPARIN SODIUM 1900 UNITS: 1000 INJECTION INTRAVENOUS; SUBCUTANEOUS at 18:13

## 2021-01-01 RX ADMIN — INSULIN LISPRO 2 UNITS: 100 INJECTION, SOLUTION INTRAVENOUS; SUBCUTANEOUS at 12:23

## 2021-01-01 RX ADMIN — Medication 10 ML: at 21:30

## 2021-01-01 RX ADMIN — HYDRALAZINE HYDROCHLORIDE 100 MG: 50 TABLET, FILM COATED ORAL at 15:27

## 2021-01-01 RX ADMIN — Medication 40 ML: at 08:06

## 2021-01-01 RX ADMIN — Medication 10 ML: at 06:38

## 2021-01-01 RX ADMIN — HEPARIN SODIUM 1100 UNITS: 1000 INJECTION INTRAVENOUS; SUBCUTANEOUS at 10:04

## 2021-01-01 RX ADMIN — Medication 10 ML: at 06:45

## 2021-01-01 RX ADMIN — ISOSORBIDE MONONITRATE 30 MG: 30 TABLET ORAL at 10:04

## 2021-01-01 RX ADMIN — SODIUM CHLORIDE 100 MG: 9 INJECTION, SOLUTION INTRAVENOUS at 18:23

## 2021-01-01 RX ADMIN — EPOETIN ALFA-EPBX 20000 UNITS: 10000 INJECTION, SOLUTION INTRAVENOUS; SUBCUTANEOUS at 21:49

## 2021-01-01 RX ADMIN — DEXMEDETOMIDINE HYDROCHLORIDE 1 MCG/KG/HR: 400 INJECTION INTRAVENOUS at 20:21

## 2021-01-01 RX ADMIN — BRIVARACETAM 100 MG: 50 INJECTION, SUSPENSION INTRAVENOUS at 05:02

## 2021-01-01 RX ADMIN — BUMETANIDE 1 MG: 0.25 INJECTION, SOLUTION INTRAMUSCULAR; INTRAVENOUS at 08:00

## 2021-01-01 RX ADMIN — HYDRALAZINE HYDROCHLORIDE 100 MG: 50 TABLET, FILM COATED ORAL at 09:21

## 2021-01-01 RX ADMIN — HEPARIN SODIUM 1900 UNITS: 1000 INJECTION INTRAVENOUS; SUBCUTANEOUS at 10:41

## 2021-01-01 RX ADMIN — HYDRALAZINE HYDROCHLORIDE 100 MG: 50 TABLET, FILM COATED ORAL at 15:30

## 2021-01-01 RX ADMIN — FAMOTIDINE 20 MG: 10 INJECTION, SOLUTION INTRAVENOUS at 09:01

## 2021-01-01 RX ADMIN — HYDRALAZINE HYDROCHLORIDE 100 MG: 50 TABLET, FILM COATED ORAL at 09:35

## 2021-01-01 RX ADMIN — SODIUM CHLORIDE 100 MG: 9 INJECTION, SOLUTION INTRAVENOUS at 18:30

## 2021-01-01 RX ADMIN — MICONAZOLE NITRATE 2 % TOPICAL POWDER: at 17:51

## 2021-01-01 RX ADMIN — ALBUMIN (HUMAN) 12.5 G: 12.5 INJECTION, SOLUTION INTRAVENOUS at 11:50

## 2021-01-01 RX ADMIN — MICONAZOLE NITRATE 2 % TOPICAL POWDER: at 17:40

## 2021-01-01 RX ADMIN — CEPHALEXIN 250 MG: 250 CAPSULE ORAL at 21:27

## 2021-01-01 RX ADMIN — Medication: at 21:25

## 2021-01-01 RX ADMIN — BUMETANIDE 2 MG: 0.25 INJECTION INTRAMUSCULAR; INTRAVENOUS at 21:00

## 2021-01-01 RX ADMIN — HYDRALAZINE HYDROCHLORIDE 100 MG: 50 TABLET, FILM COATED ORAL at 21:39

## 2021-01-01 RX ADMIN — CEPHALEXIN 250 MG: 250 CAPSULE ORAL at 17:08

## 2021-01-01 RX ADMIN — Medication 10 ML: at 14:34

## 2021-01-01 RX ADMIN — HYDRALAZINE HYDROCHLORIDE 100 MG: 50 TABLET, FILM COATED ORAL at 17:20

## 2021-01-01 RX ADMIN — CEPHALEXIN 250 MG: 250 CAPSULE ORAL at 17:40

## 2021-01-01 RX ADMIN — Medication: at 09:21

## 2021-01-01 RX ADMIN — FAMOTIDINE 20 MG: 20 TABLET ORAL at 09:27

## 2021-01-01 RX ADMIN — PROPOFOL 35 MCG/KG/MIN: 10 INJECTION, EMULSION INTRAVENOUS at 22:00

## 2021-01-01 RX ADMIN — BUMETANIDE 1 MG/HR: 0.25 INJECTION INTRAMUSCULAR; INTRAVENOUS at 18:31

## 2021-01-01 RX ADMIN — Medication 40 ML: at 13:08

## 2021-01-01 RX ADMIN — HYDRALAZINE HYDROCHLORIDE 100 MG: 50 TABLET, FILM COATED ORAL at 17:49

## 2021-01-01 RX ADMIN — HEPARIN SODIUM 1100 UNITS: 1000 INJECTION INTRAVENOUS; SUBCUTANEOUS at 16:55

## 2021-01-01 RX ADMIN — HYDRALAZINE HYDROCHLORIDE 100 MG: 50 TABLET, FILM COATED ORAL at 22:43

## 2021-01-01 RX ADMIN — HYDRALAZINE HYDROCHLORIDE 100 MG: 50 TABLET, FILM COATED ORAL at 09:29

## 2021-01-01 RX ADMIN — RANOLAZINE 1000 MG: 500 TABLET, FILM COATED, EXTENDED RELEASE ORAL at 08:03

## 2021-01-01 RX ADMIN — HYDRALAZINE HYDROCHLORIDE 100 MG: 50 TABLET, FILM COATED ORAL at 08:07

## 2021-01-01 RX ADMIN — RANOLAZINE 1000 MG: 500 TABLET, FILM COATED, EXTENDED RELEASE ORAL at 09:21

## 2021-01-01 RX ADMIN — CEPHALEXIN 250 MG: 250 CAPSULE ORAL at 09:35

## 2021-01-01 RX ADMIN — EPOETIN ALFA-EPBX 20000 UNITS: 20000 INJECTION, SOLUTION INTRAVENOUS; SUBCUTANEOUS at 22:23

## 2021-01-01 RX ADMIN — Medication 10 ML: at 05:18

## 2021-01-01 RX ADMIN — POTASSIUM CHLORIDE 20 MEQ: 750 TABLET, FILM COATED, EXTENDED RELEASE ORAL at 09:03

## 2021-01-01 RX ADMIN — HEPARIN SODIUM 1900 UNITS: 1000 INJECTION INTRAVENOUS; SUBCUTANEOUS at 10:44

## 2021-01-01 RX ADMIN — LEVOTHYROXINE SODIUM 25 MCG: 0.03 TABLET ORAL at 06:33

## 2021-01-01 RX ADMIN — HEPARIN SODIUM 1900 UNITS: 1000 INJECTION INTRAVENOUS; SUBCUTANEOUS at 14:30

## 2021-01-01 RX ADMIN — Medication 10 ML: at 14:29

## 2021-01-01 RX ADMIN — CEPHALEXIN 500 MG: 500 CAPSULE ORAL at 21:26

## 2021-01-01 RX ADMIN — Medication 10 ML: at 13:00

## 2021-01-01 RX ADMIN — HYDRALAZINE HYDROCHLORIDE 100 MG: 50 TABLET, FILM COATED ORAL at 21:46

## 2021-01-01 RX ADMIN — SODIUM CHLORIDE 40 MG: 9 INJECTION, SOLUTION INTRAMUSCULAR; INTRAVENOUS; SUBCUTANEOUS at 11:51

## 2021-01-01 RX ADMIN — MICONAZOLE NITRATE 2 % TOPICAL POWDER: at 09:38

## 2021-01-01 RX ADMIN — HYDRALAZINE HYDROCHLORIDE 100 MG: 50 TABLET, FILM COATED ORAL at 09:49

## 2021-01-01 RX ADMIN — IPRATROPIUM BROMIDE AND ALBUTEROL SULFATE 3 ML: .5; 3 SOLUTION RESPIRATORY (INHALATION) at 20:12

## 2021-01-01 RX ADMIN — PROPOFOL 45 MCG/KG/MIN: 10 INJECTION, EMULSION INTRAVENOUS at 18:23

## 2021-01-01 RX ADMIN — THERA TABS 1 TABLET: TAB at 08:52

## 2021-01-01 RX ADMIN — Medication: at 10:13

## 2021-01-01 RX ADMIN — FLUCONAZOLE 100 MG: 100 TABLET ORAL at 09:07

## 2021-01-01 RX ADMIN — Medication 10 ML: at 23:44

## 2021-01-01 RX ADMIN — ISOSORBIDE MONONITRATE 30 MG: 30 TABLET ORAL at 09:20

## 2021-01-01 RX ADMIN — HYDRALAZINE HYDROCHLORIDE 100 MG: 50 TABLET, FILM COATED ORAL at 21:27

## 2021-01-01 RX ADMIN — BUMETANIDE 1 MG/HR: 0.25 INJECTION INTRAMUSCULAR; INTRAVENOUS at 02:42

## 2021-01-01 RX ADMIN — Medication 10 ML: at 22:00

## 2021-01-01 RX ADMIN — PROPOFOL 45 MCG/KG/MIN: 10 INJECTION, EMULSION INTRAVENOUS at 06:07

## 2021-01-01 RX ADMIN — BUMETANIDE 2 MG: 1 TABLET ORAL at 21:25

## 2021-01-01 RX ADMIN — INSULIN GLARGINE 20 UNITS: 100 INJECTION, SOLUTION SUBCUTANEOUS at 10:05

## 2021-01-01 RX ADMIN — CEPHALEXIN 250 MG: 250 CAPSULE ORAL at 08:20

## 2021-01-01 RX ADMIN — ISOSORBIDE MONONITRATE 30 MG: 30 TABLET ORAL at 09:00

## 2021-01-01 RX ADMIN — HYDROCODONE BITARTRATE AND ACETAMINOPHEN 1 TABLET: 5; 325 TABLET ORAL at 05:13

## 2021-01-01 RX ADMIN — ACETAMINOPHEN 650 MG: 325 TABLET ORAL at 17:45

## 2021-01-01 RX ADMIN — HYDRALAZINE HYDROCHLORIDE 100 MG: 50 TABLET, FILM COATED ORAL at 16:59

## 2021-01-01 RX ADMIN — ISOSORBIDE MONONITRATE 30 MG: 30 TABLET, EXTENDED RELEASE ORAL at 09:01

## 2021-01-01 RX ADMIN — HYDROXYZINE HYDROCHLORIDE 25 MG: 25 TABLET, FILM COATED ORAL at 04:31

## 2021-01-01 RX ADMIN — HYDRALAZINE HYDROCHLORIDE 100 MG: 50 TABLET, FILM COATED ORAL at 10:02

## 2021-01-01 RX ADMIN — HYDRALAZINE HYDROCHLORIDE 100 MG: 50 TABLET, FILM COATED ORAL at 21:00

## 2021-01-01 RX ADMIN — LEVOTHYROXINE SODIUM 25 MCG: 0.03 TABLET ORAL at 06:47

## 2021-01-01 RX ADMIN — MAGNESIUM SULFATE HEPTAHYDRATE 1 G: 1 INJECTION, SOLUTION INTRAVENOUS at 10:20

## 2021-01-01 RX ADMIN — HEPARIN SODIUM 1900 UNITS: 1000 INJECTION INTRAVENOUS; SUBCUTANEOUS at 10:40

## 2021-01-01 RX ADMIN — BUDESONIDE AND FORMOTEROL FUMARATE DIHYDRATE 1 PUFF: 160; 4.5 AEROSOL RESPIRATORY (INHALATION) at 10:20

## 2021-01-01 RX ADMIN — CEPHALEXIN 250 MG: 250 CAPSULE ORAL at 09:13

## 2021-01-01 RX ADMIN — HEPARIN SODIUM 9 UNITS/KG/HR: 10000 INJECTION, SOLUTION INTRAVENOUS at 10:56

## 2021-01-01 RX ADMIN — HYDRALAZINE HYDROCHLORIDE 100 MG: 50 TABLET, FILM COATED ORAL at 16:51

## 2021-01-01 RX ADMIN — WARFARIN SODIUM 2.5 MG: 2.5 TABLET ORAL at 17:09

## 2021-01-01 RX ADMIN — HYDRALAZINE HYDROCHLORIDE 100 MG: 50 TABLET, FILM COATED ORAL at 09:56

## 2021-01-01 RX ADMIN — Medication 10 ML: at 13:08

## 2021-01-01 RX ADMIN — BISACODYL 10 MG: 10 SUPPOSITORY RECTAL at 19:50

## 2021-01-01 RX ADMIN — BUMETANIDE 2 MG: 0.25 INJECTION INTRAMUSCULAR; INTRAVENOUS at 17:01

## 2021-01-01 RX ADMIN — CEPHALEXIN 250 MG: 250 CAPSULE ORAL at 17:29

## 2021-01-01 RX ADMIN — RANOLAZINE 1000 MG: 500 TABLET, FILM COATED, EXTENDED RELEASE ORAL at 09:31

## 2021-01-01 RX ADMIN — BUDESONIDE AND FORMOTEROL FUMARATE DIHYDRATE 1 PUFF: 160; 4.5 AEROSOL RESPIRATORY (INHALATION) at 10:19

## 2021-01-01 RX ADMIN — WARFARIN SODIUM 3 MG: 1 TABLET ORAL at 17:50

## 2021-01-01 RX ADMIN — POTASSIUM CHLORIDE 20 MEQ: 750 TABLET, FILM COATED, EXTENDED RELEASE ORAL at 08:14

## 2021-01-01 RX ADMIN — RANOLAZINE 1000 MG: 500 TABLET, FILM COATED, EXTENDED RELEASE ORAL at 17:08

## 2021-01-01 RX ADMIN — HEPARIN SODIUM 1400 UNITS: 1000 INJECTION INTRAVENOUS; SUBCUTANEOUS at 11:05

## 2021-01-01 RX ADMIN — BUMETANIDE 2 MG: 0.25 INJECTION INTRAMUSCULAR; INTRAVENOUS at 09:13

## 2021-01-01 RX ADMIN — HEPARIN SODIUM 1900 UNITS: 1000 INJECTION INTRAVENOUS; SUBCUTANEOUS at 10:57

## 2021-01-01 RX ADMIN — ROCURONIUM BROMIDE 100 MG: 10 INJECTION, SOLUTION INTRAVENOUS at 03:26

## 2021-01-01 RX ADMIN — HYDRALAZINE HYDROCHLORIDE 100 MG: 50 TABLET, FILM COATED ORAL at 09:26

## 2021-01-01 RX ADMIN — Medication 40 ML: at 09:57

## 2021-01-01 RX ADMIN — HYDRALAZINE HYDROCHLORIDE 100 MG: 50 TABLET, FILM COATED ORAL at 00:02

## 2021-01-01 RX ADMIN — Medication 10 ML: at 21:12

## 2021-01-01 RX ADMIN — CEPHALEXIN 250 MG: 250 CAPSULE ORAL at 21:12

## 2021-01-01 RX ADMIN — LEVETIRACETAM 250 MG: 250 TABLET, FILM COATED ORAL at 10:06

## 2021-01-01 RX ADMIN — PAROXETINE 20 MG: 20 TABLET, FILM COATED ORAL at 08:07

## 2021-01-01 RX ADMIN — CEPHALEXIN 250 MG: 250 CAPSULE ORAL at 09:03

## 2021-01-01 RX ADMIN — Medication 10 ML: at 06:44

## 2021-01-01 RX ADMIN — ONDANSETRON 4 MG: 2 INJECTION INTRAMUSCULAR; INTRAVENOUS at 12:16

## 2021-01-01 RX ADMIN — CEFAZOLIN 1 G: 1 INJECTION, POWDER, FOR SOLUTION INTRAMUSCULAR; INTRAVENOUS at 14:33

## 2021-01-01 RX ADMIN — ACETAMINOPHEN 650 MG: 325 TABLET, FILM COATED ORAL at 08:07

## 2021-01-01 RX ADMIN — Medication: at 17:09

## 2021-01-01 RX ADMIN — Medication 10 ML: at 16:58

## 2021-01-01 RX ADMIN — ARFORMOTEROL TARTRATE: 15 SOLUTION RESPIRATORY (INHALATION) at 22:16

## 2021-01-01 RX ADMIN — Medication 10 ML: at 21:16

## 2021-01-01 RX ADMIN — ISOSORBIDE MONONITRATE 30 MG: 30 TABLET ORAL at 09:51

## 2021-01-01 RX ADMIN — BUMETANIDE 2 MG: 0.25 INJECTION INTRAMUSCULAR; INTRAVENOUS at 21:26

## 2021-01-01 RX ADMIN — RANOLAZINE 1000 MG: 500 TABLET, FILM COATED, EXTENDED RELEASE ORAL at 17:47

## 2021-01-01 RX ADMIN — ISOSORBIDE MONONITRATE 30 MG: 30 TABLET, EXTENDED RELEASE ORAL at 09:59

## 2021-01-01 RX ADMIN — INSULIN GLARGINE 20 UNITS: 100 INJECTION, SOLUTION SUBCUTANEOUS at 22:46

## 2021-01-01 RX ADMIN — ACETAMINOPHEN 650 MG: 325 TABLET, FILM COATED ORAL at 15:20

## 2021-01-01 RX ADMIN — HEPARIN SODIUM 13 UNITS/KG/HR: 10000 INJECTION, SOLUTION INTRAVENOUS at 07:58

## 2021-01-01 RX ADMIN — ARFORMOTEROL TARTRATE: 15 SOLUTION RESPIRATORY (INHALATION) at 19:47

## 2021-01-01 RX ADMIN — BUMETANIDE 2 MG: 0.25 INJECTION INTRAMUSCULAR; INTRAVENOUS at 19:16

## 2021-01-01 RX ADMIN — BRIVARACETAM 100 MG: 50 INJECTION, SUSPENSION INTRAVENOUS at 17:53

## 2021-01-01 RX ADMIN — GABAPENTIN 100 MG: 100 CAPSULE ORAL at 10:15

## 2021-01-01 RX ADMIN — Medication 10 ML: at 21:42

## 2021-01-01 RX ADMIN — PAROXETINE 20 MG: 20 TABLET, FILM COATED ORAL at 09:03

## 2021-01-01 RX ADMIN — Medication: at 22:19

## 2021-01-01 RX ADMIN — THERA TABS 1 TABLET: TAB at 09:51

## 2021-01-01 RX ADMIN — POTASSIUM CHLORIDE 20 MEQ: 750 TABLET, FILM COATED, EXTENDED RELEASE ORAL at 08:52

## 2021-01-01 RX ADMIN — Medication: at 21:00

## 2021-01-01 RX ADMIN — BUMETANIDE 2 MG: 0.25 INJECTION INTRAMUSCULAR; INTRAVENOUS at 11:54

## 2021-01-01 RX ADMIN — HYDRALAZINE HYDROCHLORIDE 100 MG: 50 TABLET, FILM COATED ORAL at 21:44

## 2021-01-01 RX ADMIN — BUMETANIDE 2 MG: 1 TABLET ORAL at 21:27

## 2021-01-01 RX ADMIN — INSULIN GLARGINE 12 UNITS: 100 INJECTION, SOLUTION SUBCUTANEOUS at 22:19

## 2021-01-01 RX ADMIN — Medication 10 ML: at 21:11

## 2021-01-01 RX ADMIN — SODIUM CHLORIDE 11 ML/HR: 9 INJECTION, SOLUTION INTRAVENOUS at 20:00

## 2021-01-01 RX ADMIN — RANOLAZINE 1000 MG: 500 TABLET, FILM COATED, EXTENDED RELEASE ORAL at 09:03

## 2021-01-01 RX ADMIN — Medication 50 MCG/HR: at 07:13

## 2021-01-01 RX ADMIN — CEPHALEXIN 250 MG: 250 CAPSULE ORAL at 17:53

## 2021-01-01 RX ADMIN — Medication 40 ML: at 21:17

## 2021-01-01 RX ADMIN — HYDRALAZINE HYDROCHLORIDE 100 MG: 50 TABLET, FILM COATED ORAL at 23:30

## 2021-01-01 RX ADMIN — BUMETANIDE 2 MG: 0.25 INJECTION INTRAMUSCULAR; INTRAVENOUS at 15:25

## 2021-01-01 RX ADMIN — Medication 10 ML: at 22:16

## 2021-01-01 RX ADMIN — GABAPENTIN 200 MG: 100 CAPSULE ORAL at 15:24

## 2021-01-01 RX ADMIN — LEVETIRACETAM 250 MG: 250 TABLET, FILM COATED ORAL at 17:35

## 2021-01-01 RX ADMIN — ACETAMINOPHEN 650 MG: 325 TABLET, FILM COATED ORAL at 21:24

## 2021-01-01 RX ADMIN — RANOLAZINE 1000 MG: 500 TABLET, FILM COATED, EXTENDED RELEASE ORAL at 17:51

## 2021-01-01 RX ADMIN — ACETAMINOPHEN 650 MG: 325 TABLET ORAL at 11:16

## 2021-01-01 RX ADMIN — RANOLAZINE 1000 MG: 500 TABLET, FILM COATED, EXTENDED RELEASE ORAL at 17:59

## 2021-01-01 RX ADMIN — HEPARIN SODIUM 1400 UNITS: 1000 INJECTION INTRAVENOUS; SUBCUTANEOUS at 10:04

## 2021-01-01 RX ADMIN — PAROXETINE 20 MG: 20 TABLET, FILM COATED ORAL at 09:34

## 2021-01-01 RX ADMIN — POTASSIUM CHLORIDE 20 MEQ: 750 TABLET, FILM COATED, EXTENDED RELEASE ORAL at 09:20

## 2021-01-01 RX ADMIN — HYDROCORTISONE: 1 CREAM TOPICAL at 17:46

## 2021-01-01 RX ADMIN — HYDRALAZINE HYDROCHLORIDE 10 MG: 20 INJECTION INTRAMUSCULAR; INTRAVENOUS at 09:55

## 2021-01-01 RX ADMIN — HYDRALAZINE HYDROCHLORIDE 100 MG: 50 TABLET, FILM COATED ORAL at 22:19

## 2021-01-01 RX ADMIN — HYDRALAZINE HYDROCHLORIDE 100 MG: 50 TABLET, FILM COATED ORAL at 16:54

## 2021-01-01 RX ADMIN — Medication: at 21:38

## 2021-01-01 RX ADMIN — POLYETHYLENE GLYCOL 3350 17 G: 17 POWDER, FOR SOLUTION ORAL at 09:13

## 2021-01-01 RX ADMIN — Medication 10 ML: at 21:05

## 2021-01-01 RX ADMIN — CEPHALEXIN 250 MG: 250 CAPSULE ORAL at 17:47

## 2021-01-01 RX ADMIN — ALTEPLASE 2 MG: 2.2 INJECTION, POWDER, LYOPHILIZED, FOR SOLUTION INTRAVENOUS at 10:54

## 2021-01-01 RX ADMIN — ALBUMIN (HUMAN) 12.5 G: 0.25 INJECTION, SOLUTION INTRAVENOUS at 17:22

## 2021-01-01 RX ADMIN — Medication: at 08:55

## 2021-01-01 RX ADMIN — BUMETANIDE 1 MG: 0.25 INJECTION, SOLUTION INTRAMUSCULAR; INTRAVENOUS at 17:49

## 2021-01-01 RX ADMIN — Medication: at 21:22

## 2021-01-01 RX ADMIN — LEVETIRACETAM 250 MG: 250 TABLET, FILM COATED ORAL at 08:41

## 2021-01-01 RX ADMIN — METHYLPREDNISOLONE SODIUM SUCCINATE 40 MG: 40 INJECTION, POWDER, FOR SOLUTION INTRAMUSCULAR; INTRAVENOUS at 08:45

## 2021-01-01 RX ADMIN — Medication: at 22:11

## 2021-01-01 RX ADMIN — NOREPINEPHRINE BITARTRATE 2 MCG/MIN: 1 INJECTION, SOLUTION, CONCENTRATE INTRAVENOUS at 14:52

## 2021-01-01 RX ADMIN — FLUCONAZOLE 100 MG: 100 TABLET ORAL at 11:51

## 2021-01-01 RX ADMIN — CEPHALEXIN 500 MG: 500 CAPSULE ORAL at 11:09

## 2021-01-01 RX ADMIN — GABAPENTIN 200 MG: 100 CAPSULE ORAL at 22:43

## 2021-01-01 RX ADMIN — ISOSORBIDE MONONITRATE 30 MG: 30 TABLET, EXTENDED RELEASE ORAL at 17:09

## 2021-01-01 RX ADMIN — MICONAZOLE NITRATE 2 % TOPICAL POWDER: at 09:51

## 2021-01-01 RX ADMIN — PROPOFOL 25 MCG/KG/MIN: 10 INJECTION, EMULSION INTRAVENOUS at 15:32

## 2021-01-01 RX ADMIN — FAMOTIDINE 20 MG: 10 INJECTION, SOLUTION INTRAVENOUS at 20:52

## 2021-01-01 RX ADMIN — ALBUMIN (HUMAN) 12.5 G: 12.5 INJECTION, SOLUTION INTRAVENOUS at 13:22

## 2021-01-01 RX ADMIN — CEPHALEXIN 250 MG: 250 CAPSULE ORAL at 17:44

## 2021-01-01 RX ADMIN — HYDRALAZINE HYDROCHLORIDE 100 MG: 50 TABLET, FILM COATED ORAL at 10:15

## 2021-01-01 RX ADMIN — HYDRALAZINE HYDROCHLORIDE 100 MG: 50 TABLET, FILM COATED ORAL at 08:40

## 2021-01-01 RX ADMIN — ALBUMIN (HUMAN) 12.5 G: 0.25 INJECTION, SOLUTION INTRAVENOUS at 23:28

## 2021-01-01 RX ADMIN — RANOLAZINE 1000 MG: 500 TABLET, FILM COATED, EXTENDED RELEASE ORAL at 21:23

## 2021-01-01 RX ADMIN — HYDRALAZINE HYDROCHLORIDE 10 MG: 20 INJECTION INTRAMUSCULAR; INTRAVENOUS at 10:52

## 2021-01-01 RX ADMIN — INSULIN LISPRO 2 UNITS: 100 INJECTION, SOLUTION INTRAVENOUS; SUBCUTANEOUS at 12:17

## 2021-01-01 RX ADMIN — HYDRALAZINE HYDROCHLORIDE 100 MG: 50 TABLET, FILM COATED ORAL at 10:51

## 2021-01-01 RX ADMIN — HYDRALAZINE HYDROCHLORIDE 100 MG: 50 TABLET, FILM COATED ORAL at 16:00

## 2021-01-01 RX ADMIN — WARFARIN SODIUM 2.5 MG: 2.5 TABLET ORAL at 17:57

## 2021-01-01 RX ADMIN — MICONAZOLE NITRATE 2 % TOPICAL POWDER: at 11:21

## 2021-01-01 RX ADMIN — RANOLAZINE 1000 MG: 500 TABLET, FILM COATED, EXTENDED RELEASE ORAL at 18:04

## 2021-01-01 RX ADMIN — EPOETIN ALFA-EPBX 20000 UNITS: 10000 INJECTION, SOLUTION INTRAVENOUS; SUBCUTANEOUS at 21:06

## 2021-01-01 RX ADMIN — HYDRALAZINE HYDROCHLORIDE 100 MG: 50 TABLET, FILM COATED ORAL at 20:27

## 2021-01-01 RX ADMIN — PAROXETINE 20 MG: 20 TABLET, FILM COATED ORAL at 09:20

## 2021-01-01 RX ADMIN — RANOLAZINE 1000 MG: 500 TABLET, FILM COATED, EXTENDED RELEASE ORAL at 21:21

## 2021-01-01 RX ADMIN — Medication 10 ML: at 17:49

## 2021-01-01 RX ADMIN — FAMOTIDINE 20 MG: 10 INJECTION, SOLUTION INTRAVENOUS at 11:59

## 2021-01-01 RX ADMIN — LEVETIRACETAM 250 MG: 250 TABLET, FILM COATED ORAL at 09:32

## 2021-01-01 RX ADMIN — Medication 20 ML: at 22:18

## 2021-01-01 RX ADMIN — VANCOMYCIN HYDROCHLORIDE 750 MG: 750 INJECTION, POWDER, LYOPHILIZED, FOR SOLUTION INTRAVENOUS at 12:00

## 2021-01-01 RX ADMIN — RANOLAZINE 1000 MG: 500 TABLET, FILM COATED, EXTENDED RELEASE ORAL at 09:00

## 2021-01-01 RX ADMIN — RANOLAZINE 1000 MG: 500 TABLET, FILM COATED, EXTENDED RELEASE ORAL at 21:16

## 2021-01-01 RX ADMIN — HYDRALAZINE HYDROCHLORIDE 100 MG: 50 TABLET, FILM COATED ORAL at 09:20

## 2021-01-01 RX ADMIN — PAROXETINE HYDROCHLORIDE 20 MG: 20 TABLET, FILM COATED ORAL at 08:25

## 2021-01-01 RX ADMIN — DIPHENHYDRAMINE HYDROCHLORIDE AND LIDOCAINE HYDROCHLORIDE AND ALUMINUM HYDROXIDE AND MAGNESIUM HYDRO 5 ML: KIT at 05:04

## 2021-01-01 RX ADMIN — Medication 10 ML: at 06:22

## 2021-01-01 RX ADMIN — Medication 10 ML: at 17:51

## 2021-01-01 RX ADMIN — VANCOMYCIN HYDROCHLORIDE 500 MG: 500 INJECTION, POWDER, LYOPHILIZED, FOR SOLUTION INTRAVENOUS at 12:21

## 2021-01-01 RX ADMIN — LEVOTHYROXINE SODIUM 25 MCG: 0.03 TABLET ORAL at 07:33

## 2021-01-01 RX ADMIN — Medication 1 CAPSULE: at 09:01

## 2021-01-01 RX ADMIN — Medication: at 23:31

## 2021-01-01 RX ADMIN — WARFARIN SODIUM 4 MG: 1 TABLET ORAL at 17:53

## 2021-01-01 RX ADMIN — INSULIN GLARGINE 20 UNITS: 100 INJECTION, SOLUTION SUBCUTANEOUS at 09:00

## 2021-01-01 RX ADMIN — CEPHALEXIN 250 MG: 250 CAPSULE ORAL at 09:41

## 2021-01-01 RX ADMIN — POTASSIUM CHLORIDE 20 MEQ: 750 TABLET, FILM COATED, EXTENDED RELEASE ORAL at 09:13

## 2021-01-01 RX ADMIN — INSULIN LISPRO 2 UNITS: 100 INJECTION, SOLUTION INTRAVENOUS; SUBCUTANEOUS at 12:12

## 2021-01-01 RX ADMIN — IPRATROPIUM BROMIDE AND ALBUTEROL SULFATE 3 ML: .5; 3 SOLUTION RESPIRATORY (INHALATION) at 07:54

## 2021-01-01 RX ADMIN — PIPERACILLIN AND TAZOBACTAM 3.38 G: 3; .375 INJECTION, POWDER, LYOPHILIZED, FOR SOLUTION INTRAVENOUS at 07:33

## 2021-01-01 RX ADMIN — CEPHALEXIN 250 MG: 250 CAPSULE ORAL at 09:49

## 2021-01-01 RX ADMIN — ACETAMINOPHEN 650 MG: 325 TABLET, FILM COATED ORAL at 21:33

## 2021-01-01 RX ADMIN — ISOSORBIDE MONONITRATE 30 MG: 30 TABLET, EXTENDED RELEASE ORAL at 08:40

## 2021-01-01 RX ADMIN — BUDESONIDE AND FORMOTEROL FUMARATE DIHYDRATE 1 PUFF: 160; 4.5 AEROSOL RESPIRATORY (INHALATION) at 21:08

## 2021-01-01 RX ADMIN — ARFORMOTEROL TARTRATE: 15 SOLUTION RESPIRATORY (INHALATION) at 19:46

## 2021-01-01 RX ADMIN — HYDRALAZINE HYDROCHLORIDE 100 MG: 50 TABLET, FILM COATED ORAL at 17:29

## 2021-01-01 RX ADMIN — Medication 10 ML: at 15:57

## 2021-01-01 RX ADMIN — HYDROCODONE BITARTRATE AND ACETAMINOPHEN 1 TABLET: 5; 325 TABLET ORAL at 13:35

## 2021-01-01 RX ADMIN — RANOLAZINE 1000 MG: 500 TABLET, FILM COATED, EXTENDED RELEASE ORAL at 09:49

## 2021-01-01 RX ADMIN — HYDRALAZINE HYDROCHLORIDE 100 MG: 50 TABLET, FILM COATED ORAL at 17:52

## 2021-01-01 RX ADMIN — Medication 10 ML: at 13:22

## 2021-01-01 RX ADMIN — Medication 10 ML: at 09:12

## 2021-01-01 RX ADMIN — PAROXETINE 20 MG: 20 TABLET, FILM COATED ORAL at 09:49

## 2021-01-01 RX ADMIN — BUMETANIDE 2 MG: 0.25 INJECTION INTRAMUSCULAR; INTRAVENOUS at 18:05

## 2021-01-01 RX ADMIN — Medication 10 ML: at 09:33

## 2021-01-01 RX ADMIN — GUAIFENESIN 600 MG: 600 TABLET, EXTENDED RELEASE ORAL at 13:36

## 2021-01-01 RX ADMIN — ACETAMINOPHEN 650 MG: 325 TABLET ORAL at 13:24

## 2021-01-01 RX ADMIN — RANOLAZINE 1000 MG: 500 TABLET, FILM COATED, EXTENDED RELEASE ORAL at 08:24

## 2021-01-01 RX ADMIN — EPOETIN ALFA-EPBX 20000 UNITS: 20000 INJECTION, SOLUTION INTRAVENOUS; SUBCUTANEOUS at 22:43

## 2021-01-01 RX ADMIN — KETAMINE HYDROCHLORIDE 100 MG: 50 INJECTION INTRAMUSCULAR; INTRAVENOUS at 03:01

## 2021-01-01 RX ADMIN — PAROXETINE HYDROCHLORIDE 20 MG: 20 TABLET, FILM COATED ORAL at 10:27

## 2021-01-01 RX ADMIN — OXYMETAZOLINE HCL 2 SPRAY: 0.05 SPRAY NASAL at 18:00

## 2021-01-01 RX ADMIN — CEPHALEXIN 250 MG: 250 CAPSULE ORAL at 08:36

## 2021-01-01 RX ADMIN — PAROXETINE HYDROCHLORIDE 20 MG: 20 TABLET, FILM COATED ORAL at 10:06

## 2021-01-01 RX ADMIN — BRIVARACETAM 100 MG: 50 INJECTION, SUSPENSION INTRAVENOUS at 11:46

## 2021-01-01 RX ADMIN — Medication 10 ML: at 13:13

## 2021-01-01 RX ADMIN — HYDRALAZINE HYDROCHLORIDE 100 MG: 50 TABLET, FILM COATED ORAL at 21:19

## 2021-01-01 RX ADMIN — Medication 10 ML: at 06:21

## 2021-01-01 RX ADMIN — Medication 10 ML: at 06:20

## 2021-01-01 RX ADMIN — METHYLPREDNISOLONE SODIUM SUCCINATE 40 MG: 40 INJECTION, POWDER, FOR SOLUTION INTRAMUSCULAR; INTRAVENOUS at 09:21

## 2021-01-01 RX ADMIN — HYDROCODONE BITARTRATE AND ACETAMINOPHEN 1 TABLET: 5; 325 TABLET ORAL at 05:04

## 2021-01-01 RX ADMIN — BUDESONIDE AND FORMOTEROL FUMARATE DIHYDRATE 1 PUFF: 160; 4.5 AEROSOL RESPIRATORY (INHALATION) at 08:25

## 2021-01-01 RX ADMIN — Medication 40 ML: at 07:28

## 2021-01-01 RX ADMIN — DOCUSATE SODIUM 50 MG AND SENNOSIDES 8.6 MG 1 TABLET: 8.6; 5 TABLET, FILM COATED ORAL at 12:37

## 2021-01-01 RX ADMIN — RANOLAZINE 1000 MG: 500 TABLET, FILM COATED, EXTENDED RELEASE ORAL at 08:02

## 2021-01-01 RX ADMIN — LIDOCAINE HYDROCHLORIDE 9 ML: 20 INJECTION, SOLUTION INFILTRATION; PERINEURAL at 13:40

## 2021-01-01 RX ADMIN — POTASSIUM CHLORIDE 20 MEQ: 750 TABLET, FILM COATED, EXTENDED RELEASE ORAL at 09:41

## 2021-01-01 RX ADMIN — INSULIN GLARGINE 20 UNITS: 100 INJECTION, SOLUTION SUBCUTANEOUS at 09:50

## 2021-01-01 RX ADMIN — Medication 10 ML: at 08:44

## 2021-01-01 RX ADMIN — INSULIN GLARGINE 20 UNITS: 100 INJECTION, SOLUTION SUBCUTANEOUS at 10:23

## 2021-01-01 RX ADMIN — INSULIN GLARGINE 20 UNITS: 100 INJECTION, SOLUTION SUBCUTANEOUS at 22:41

## 2021-01-01 RX ADMIN — CEPHALEXIN 250 MG: 250 CAPSULE ORAL at 18:34

## 2021-01-01 RX ADMIN — MICONAZOLE NITRATE 2 % TOPICAL POWDER: at 10:05

## 2021-01-01 RX ADMIN — Medication 10 ML: at 13:10

## 2021-01-01 RX ADMIN — IPRATROPIUM BROMIDE AND ALBUTEROL SULFATE 3 ML: .5; 3 SOLUTION RESPIRATORY (INHALATION) at 07:13

## 2021-01-01 RX ADMIN — Medication: at 09:58

## 2021-01-01 RX ADMIN — PAROXETINE 20 MG: 20 TABLET, FILM COATED ORAL at 09:51

## 2021-01-01 RX ADMIN — PAROXETINE 20 MG: 20 TABLET, FILM COATED ORAL at 09:35

## 2021-01-01 RX ADMIN — INSULIN GLARGINE 20 UNITS: 100 INJECTION, SOLUTION SUBCUTANEOUS at 22:07

## 2021-01-01 RX ADMIN — HYDROCODONE BITARTRATE AND ACETAMINOPHEN 1 TABLET: 5; 325 TABLET ORAL at 16:54

## 2021-01-01 RX ADMIN — RANOLAZINE 1000 MG: 500 TABLET, FILM COATED, EXTENDED RELEASE ORAL at 21:36

## 2021-01-01 RX ADMIN — PAROXETINE 20 MG: 20 TABLET, FILM COATED ORAL at 08:52

## 2021-01-01 RX ADMIN — MICONAZOLE NITRATE 2 % TOPICAL POWDER: at 17:00

## 2021-01-01 RX ADMIN — MICONAZOLE NITRATE 2 % TOPICAL POWDER: at 17:30

## 2021-01-01 RX ADMIN — SODIUM CHLORIDE 100 MG: 9 INJECTION, SOLUTION INTRAVENOUS at 21:43

## 2021-01-01 RX ADMIN — HEPARIN SODIUM 1900 UNITS: 1000 INJECTION INTRAVENOUS; SUBCUTANEOUS at 18:12

## 2021-01-01 RX ADMIN — HYDRALAZINE HYDROCHLORIDE 10 MG: 20 INJECTION INTRAMUSCULAR; INTRAVENOUS at 03:32

## 2021-01-01 RX ADMIN — BUMETANIDE 2 MG: 0.25 INJECTION INTRAMUSCULAR; INTRAVENOUS at 17:51

## 2021-01-01 RX ADMIN — ACETAMINOPHEN 650 MG: 325 TABLET, FILM COATED ORAL at 11:41

## 2021-01-01 RX ADMIN — Medication 10 ML: at 15:07

## 2021-01-01 RX ADMIN — ISOSORBIDE MONONITRATE 30 MG: 30 TABLET, EXTENDED RELEASE ORAL at 09:21

## 2021-01-01 RX ADMIN — ISOSORBIDE MONONITRATE 30 MG: 30 TABLET ORAL at 09:58

## 2021-01-01 RX ADMIN — ISOSORBIDE MONONITRATE 30 MG: 30 TABLET ORAL at 09:13

## 2021-01-01 RX ADMIN — Medication: at 17:42

## 2021-01-01 RX ADMIN — HYDRALAZINE HYDROCHLORIDE 10 MG: 20 INJECTION INTRAMUSCULAR; INTRAVENOUS at 23:30

## 2021-01-01 RX ADMIN — HEPARIN SODIUM 3800 UNITS: 1000 INJECTION INTRAVENOUS; SUBCUTANEOUS at 19:26

## 2021-01-01 RX ADMIN — Medication: at 18:28

## 2021-01-01 RX ADMIN — INSULIN LISPRO 2 UNITS: 100 INJECTION, SOLUTION INTRAVENOUS; SUBCUTANEOUS at 07:24

## 2021-01-01 RX ADMIN — THERA TABS 1 TABLET: TAB at 09:03

## 2021-01-01 RX ADMIN — HYDRALAZINE HYDROCHLORIDE 100 MG: 50 TABLET, FILM COATED ORAL at 22:03

## 2021-01-01 RX ADMIN — HYDRALAZINE HYDROCHLORIDE 100 MG: 50 TABLET, FILM COATED ORAL at 15:20

## 2021-01-01 RX ADMIN — RANOLAZINE 1000 MG: 500 TABLET, FILM COATED, EXTENDED RELEASE ORAL at 17:44

## 2021-01-01 RX ADMIN — Medication 10 ML: at 21:00

## 2021-01-01 RX ADMIN — Medication: at 17:15

## 2021-01-01 RX ADMIN — THERA TABS 1 TABLET: TAB at 08:36

## 2021-01-01 RX ADMIN — WARFARIN SODIUM 3 MG: 1 TABLET ORAL at 16:59

## 2021-01-01 RX ADMIN — ALBUMIN (HUMAN) 12.5 G: 12.5 INJECTION, SOLUTION INTRAVENOUS at 16:08

## 2021-01-01 RX ADMIN — MICONAZOLE NITRATE 2 % TOPICAL POWDER: at 17:48

## 2021-01-01 RX ADMIN — FAMOTIDINE 20 MG: 20 TABLET ORAL at 08:38

## 2021-01-01 RX ADMIN — DEXMEDETOMIDINE HYDROCHLORIDE 1 MCG/KG/HR: 400 INJECTION INTRAVENOUS at 03:41

## 2021-01-01 RX ADMIN — INSULIN GLARGINE 20 UNITS: 100 INJECTION, SOLUTION SUBCUTANEOUS at 23:09

## 2021-01-01 RX ADMIN — Medication 10 ML: at 21:19

## 2021-01-01 RX ADMIN — Medication 10 ML: at 22:32

## 2021-01-01 RX ADMIN — ARFORMOTEROL TARTRATE: 15 SOLUTION RESPIRATORY (INHALATION) at 21:17

## 2021-01-01 RX ADMIN — ARFORMOTEROL TARTRATE: 15 SOLUTION RESPIRATORY (INHALATION) at 20:09

## 2021-01-01 RX ADMIN — ACETAMINOPHEN 650 MG: 325 TABLET, FILM COATED ORAL at 08:36

## 2021-01-01 RX ADMIN — Medication: at 17:41

## 2021-01-01 RX ADMIN — BUMETANIDE 2 MG: 1 TABLET ORAL at 16:58

## 2021-01-01 RX ADMIN — Medication 10 ML: at 12:45

## 2021-01-01 RX ADMIN — ISOSORBIDE MONONITRATE 30 MG: 30 TABLET, EXTENDED RELEASE ORAL at 08:38

## 2021-01-01 RX ADMIN — HYDRALAZINE HYDROCHLORIDE 100 MG: 50 TABLET, FILM COATED ORAL at 21:23

## 2021-01-01 RX ADMIN — CEPHALEXIN 250 MG: 250 CAPSULE ORAL at 09:58

## 2021-01-01 RX ADMIN — BUMETANIDE 1 MG/HR: 0.25 INJECTION INTRAMUSCULAR; INTRAVENOUS at 04:48

## 2021-01-01 RX ADMIN — ACETAMINOPHEN 650 MG: 325 TABLET, FILM COATED ORAL at 20:42

## 2021-01-01 RX ADMIN — Medication: at 10:29

## 2021-01-01 RX ADMIN — DEXTROSE MONOHYDRATE 150 MG: 50 INJECTION, SOLUTION INTRAVENOUS at 10:38

## 2021-01-01 RX ADMIN — RANOLAZINE 1000 MG: 500 TABLET, FILM COATED, EXTENDED RELEASE ORAL at 17:02

## 2021-01-01 RX ADMIN — Medication 10 ML: at 23:11

## 2021-01-01 RX ADMIN — CEPHALEXIN 250 MG: 250 CAPSULE ORAL at 08:02

## 2021-01-01 RX ADMIN — FAMOTIDINE 20 MG: 10 INJECTION, SOLUTION INTRAVENOUS at 09:21

## 2021-01-01 RX ADMIN — Medication 10 ML: at 14:00

## 2021-01-01 RX ADMIN — Medication 40 ML: at 15:18

## 2021-01-01 RX ADMIN — DEXMEDETOMIDINE HYDROCHLORIDE 0.3 MCG/KG/HR: 400 INJECTION INTRAVENOUS at 14:43

## 2021-01-01 RX ADMIN — ISOSORBIDE MONONITRATE 30 MG: 30 TABLET ORAL at 10:15

## 2021-01-01 RX ADMIN — PIPERACILLIN AND TAZOBACTAM 3.38 G: 3; .375 INJECTION, POWDER, LYOPHILIZED, FOR SOLUTION INTRAVENOUS at 07:44

## 2021-01-01 RX ADMIN — DIPHENHYDRAMINE HYDROCHLORIDE AND LIDOCAINE HYDROCHLORIDE AND ALUMINUM HYDROXIDE AND MAGNESIUM HYDRO 5 ML: KIT at 15:07

## 2021-01-01 RX ADMIN — CEPHALEXIN 250 MG: 250 CAPSULE ORAL at 10:27

## 2021-01-01 RX ADMIN — ACETAMINOPHEN 650 MG: 325 TABLET, FILM COATED ORAL at 23:40

## 2021-01-01 RX ADMIN — BUMETANIDE 2 MG: 0.25 INJECTION INTRAMUSCULAR; INTRAVENOUS at 10:15

## 2021-01-01 RX ADMIN — ISOSORBIDE MONONITRATE 30 MG: 30 TABLET ORAL at 08:52

## 2021-01-01 RX ADMIN — Medication: at 18:18

## 2021-01-01 RX ADMIN — BUMETANIDE 2 MG: 0.25 INJECTION INTRAMUSCULAR; INTRAVENOUS at 17:57

## 2021-01-01 RX ADMIN — MICONAZOLE NITRATE 2 % TOPICAL POWDER: at 09:00

## 2021-01-01 RX ADMIN — INSULIN LISPRO 2 UNITS: 100 INJECTION, SOLUTION INTRAVENOUS; SUBCUTANEOUS at 12:41

## 2021-01-01 RX ADMIN — BUMETANIDE 2 MG: 0.25 INJECTION INTRAMUSCULAR; INTRAVENOUS at 09:48

## 2021-01-01 RX ADMIN — MICONAZOLE NITRATE 2 % TOPICAL POWDER: at 12:11

## 2021-01-01 RX ADMIN — HYDRALAZINE HYDROCHLORIDE 100 MG: 50 TABLET, FILM COATED ORAL at 08:02

## 2021-01-01 RX ADMIN — BUMETANIDE 2 MG: 0.25 INJECTION INTRAMUSCULAR; INTRAVENOUS at 21:46

## 2021-01-01 RX ADMIN — FAMOTIDINE 20 MG: 10 INJECTION, SOLUTION INTRAVENOUS at 09:16

## 2021-01-01 RX ADMIN — CEFEPIME 2 G: 2 INJECTION, POWDER, FOR SOLUTION INTRAVENOUS at 03:23

## 2021-01-01 RX ADMIN — Medication 10 ML: at 22:22

## 2021-01-01 RX ADMIN — Medication 20 ML: at 06:46

## 2021-01-01 RX ADMIN — Medication 10 ML: at 21:29

## 2021-01-01 RX ADMIN — Medication 10 ML: at 15:10

## 2021-01-01 RX ADMIN — ALBUMIN (HUMAN) 25 G: 0.25 INJECTION, SOLUTION INTRAVENOUS at 06:40

## 2021-01-01 RX ADMIN — HYDRALAZINE HYDROCHLORIDE 100 MG: 50 TABLET, FILM COATED ORAL at 16:58

## 2021-01-01 RX ADMIN — PAROXETINE 20 MG: 20 TABLET, FILM COATED ORAL at 09:58

## 2021-01-01 RX ADMIN — MICONAZOLE NITRATE 2 % TOPICAL POWDER: at 19:22

## 2021-01-01 RX ADMIN — Medication 10 ML: at 21:34

## 2021-01-01 RX ADMIN — HYDRALAZINE HYDROCHLORIDE 100 MG: 50 TABLET, FILM COATED ORAL at 22:30

## 2021-01-01 RX ADMIN — BUDESONIDE AND FORMOTEROL FUMARATE DIHYDRATE 1 PUFF: 160; 4.5 AEROSOL RESPIRATORY (INHALATION) at 08:55

## 2021-01-01 RX ADMIN — CEPHALEXIN 500 MG: 500 CAPSULE ORAL at 17:49

## 2021-01-01 RX ADMIN — Medication 50 MCG/HR: at 12:38

## 2021-01-01 RX ADMIN — HYDRALAZINE HYDROCHLORIDE 100 MG: 50 TABLET, FILM COATED ORAL at 12:38

## 2021-01-01 RX ADMIN — HEPARIN SODIUM 1900 UNITS: 1000 INJECTION INTRAVENOUS; SUBCUTANEOUS at 10:45

## 2021-01-01 RX ADMIN — BUMETANIDE 2 MG: 0.25 INJECTION INTRAMUSCULAR; INTRAVENOUS at 22:20

## 2021-01-01 RX ADMIN — WARFARIN SODIUM 4 MG: 4 TABLET ORAL at 17:48

## 2021-01-01 RX ADMIN — EPOETIN ALFA-EPBX 20000 UNITS: 10000 INJECTION, SOLUTION INTRAVENOUS; SUBCUTANEOUS at 20:41

## 2021-01-01 RX ADMIN — FLUCONAZOLE 100 MG: 100 TABLET ORAL at 09:31

## 2021-01-01 RX ADMIN — HYDRALAZINE HYDROCHLORIDE 10 MG: 20 INJECTION INTRAMUSCULAR; INTRAVENOUS at 04:40

## 2021-01-01 RX ADMIN — Medication: at 21:41

## 2021-01-01 RX ADMIN — PROPOFOL 45 MCG/KG/MIN: 10 INJECTION, EMULSION INTRAVENOUS at 21:28

## 2021-01-01 RX ADMIN — ISOSORBIDE MONONITRATE 30 MG: 30 TABLET, EXTENDED RELEASE ORAL at 09:26

## 2021-01-01 RX ADMIN — PAROXETINE HYDROCHLORIDE 20 MG: 20 TABLET, FILM COATED ORAL at 15:55

## 2021-01-01 RX ADMIN — MICONAZOLE NITRATE 2 % TOPICAL POWDER: at 17:01

## 2021-01-01 RX ADMIN — LEVETIRACETAM 250 MG: 250 TABLET, FILM COATED ORAL at 17:45

## 2021-01-01 RX ADMIN — RANOLAZINE 1000 MG: 500 TABLET, FILM COATED, EXTENDED RELEASE ORAL at 09:35

## 2021-01-01 RX ADMIN — METHYLPREDNISOLONE SODIUM SUCCINATE 40 MG: 40 INJECTION, POWDER, FOR SOLUTION INTRAMUSCULAR; INTRAVENOUS at 12:44

## 2021-01-01 RX ADMIN — EPOETIN ALFA-EPBX 20000 UNITS: 20000 INJECTION, SOLUTION INTRAVENOUS; SUBCUTANEOUS at 22:14

## 2021-01-01 RX ADMIN — Medication 40 ML: at 13:59

## 2021-01-01 RX ADMIN — BUMETANIDE 2 MG: 0.25 INJECTION INTRAMUSCULAR; INTRAVENOUS at 08:37

## 2021-01-01 RX ADMIN — ALBUMIN (HUMAN) 25 G: 0.25 INJECTION, SOLUTION INTRAVENOUS at 01:02

## 2021-01-01 RX ADMIN — ALBUMIN (HUMAN) 12.5 G: 12.5 INJECTION, SOLUTION INTRAVENOUS at 09:29

## 2021-01-01 RX ADMIN — IPRATROPIUM BROMIDE AND ALBUTEROL SULFATE 3 ML: .5; 3 SOLUTION RESPIRATORY (INHALATION) at 07:39

## 2021-01-01 RX ADMIN — LEVETIRACETAM 250 MG: 250 TABLET, FILM COATED ORAL at 09:07

## 2021-01-01 RX ADMIN — Medication: at 18:22

## 2021-01-01 RX ADMIN — HYDROCORTISONE: 1 CREAM TOPICAL at 17:50

## 2021-01-01 RX ADMIN — CEFAZOLIN 1 G: 1 INJECTION, POWDER, FOR SOLUTION INTRAMUSCULAR; INTRAVENOUS at 14:39

## 2021-01-01 RX ADMIN — BUMETANIDE 2 MG: 0.25 INJECTION INTRAMUSCULAR; INTRAVENOUS at 10:02

## 2021-01-01 RX ADMIN — POTASSIUM CHLORIDE 20 MEQ: 400 INJECTION, SOLUTION INTRAVENOUS at 10:29

## 2021-01-01 RX ADMIN — MICONAZOLE NITRATE 2 % TOPICAL POWDER: at 09:57

## 2021-01-01 RX ADMIN — CEPHALEXIN 250 MG: 250 CAPSULE ORAL at 17:01

## 2021-01-01 RX ADMIN — HYDRALAZINE HYDROCHLORIDE 100 MG: 50 TABLET, FILM COATED ORAL at 21:41

## 2021-01-01 RX ADMIN — DEXMEDETOMIDINE HYDROCHLORIDE 0.2 MCG/KG/HR: 400 INJECTION INTRAVENOUS at 22:41

## 2021-01-01 RX ADMIN — Medication: at 18:00

## 2021-01-01 RX ADMIN — ACETAMINOPHEN 650 MG: 325 TABLET, FILM COATED ORAL at 12:41

## 2021-01-01 RX ADMIN — ARFORMOTEROL TARTRATE: 15 SOLUTION RESPIRATORY (INHALATION) at 10:58

## 2021-01-01 RX ADMIN — HYDROCODONE BITARTRATE AND ACETAMINOPHEN 1 TABLET: 5; 325 TABLET ORAL at 11:09

## 2021-01-01 RX ADMIN — ACETAMINOPHEN 650 MG: 325 TABLET ORAL at 21:55

## 2021-01-01 RX ADMIN — RANOLAZINE 1000 MG: 500 TABLET, FILM COATED, EXTENDED RELEASE ORAL at 09:55

## 2021-01-01 RX ADMIN — HYDRALAZINE HYDROCHLORIDE 10 MG: 20 INJECTION INTRAMUSCULAR; INTRAVENOUS at 23:37

## 2021-01-01 RX ADMIN — CEPHALEXIN 250 MG: 250 CAPSULE ORAL at 08:52

## 2021-01-01 RX ADMIN — HYDRALAZINE HYDROCHLORIDE 100 MG: 50 TABLET, FILM COATED ORAL at 11:54

## 2021-01-01 RX ADMIN — PIPERACILLIN AND TAZOBACTAM 3.38 G: 3; .375 INJECTION, POWDER, LYOPHILIZED, FOR SOLUTION INTRAVENOUS at 07:24

## 2021-01-01 RX ADMIN — Medication 10 ML: at 16:55

## 2021-01-01 RX ADMIN — GABAPENTIN 200 MG: 100 CAPSULE ORAL at 21:00

## 2021-01-01 RX ADMIN — PAROXETINE HYDROCHLORIDE 20 MG: 20 TABLET, FILM COATED ORAL at 09:46

## 2021-01-01 RX ADMIN — HYDRALAZINE HYDROCHLORIDE 100 MG: 50 TABLET, FILM COATED ORAL at 22:16

## 2021-01-01 RX ADMIN — Medication 10 ML: at 09:03

## 2021-01-01 RX ADMIN — MICONAZOLE NITRATE 2 % TOPICAL POWDER: at 17:03

## 2021-01-01 RX ADMIN — IPRATROPIUM BROMIDE AND ALBUTEROL SULFATE 3 ML: .5; 3 SOLUTION RESPIRATORY (INHALATION) at 02:35

## 2021-01-01 RX ADMIN — EPOETIN ALFA-EPBX 20000 UNITS: 10000 INJECTION, SOLUTION INTRAVENOUS; SUBCUTANEOUS at 21:17

## 2021-01-01 RX ADMIN — IPRATROPIUM BROMIDE AND ALBUTEROL SULFATE 3 ML: .5; 3 SOLUTION RESPIRATORY (INHALATION) at 05:05

## 2021-01-01 RX ADMIN — VANCOMYCIN HYDROCHLORIDE 2000 MG: 10 INJECTION, POWDER, LYOPHILIZED, FOR SOLUTION INTRAVENOUS at 04:29

## 2021-01-01 RX ADMIN — WARFARIN SODIUM 4 MG: 2 TABLET ORAL at 16:06

## 2021-01-01 RX ADMIN — MICONAZOLE NITRATE 2 % TOPICAL POWDER: at 10:09

## 2021-01-01 RX ADMIN — FENTANYL CITRATE 25 MCG: 50 INJECTION, SOLUTION INTRAMUSCULAR; INTRAVENOUS at 13:20

## 2021-01-01 RX ADMIN — HYDRALAZINE HYDROCHLORIDE 20 MG: 20 INJECTION INTRAMUSCULAR; INTRAVENOUS at 23:35

## 2021-01-01 RX ADMIN — HYDRALAZINE HYDROCHLORIDE 100 MG: 50 TABLET, FILM COATED ORAL at 17:53

## 2021-01-01 RX ADMIN — METHYLPREDNISOLONE SODIUM SUCCINATE 40 MG: 40 INJECTION, POWDER, FOR SOLUTION INTRAMUSCULAR; INTRAVENOUS at 08:29

## 2021-01-01 RX ADMIN — HEPARIN SODIUM 13 UNITS/KG/HR: 10000 INJECTION, SOLUTION INTRAVENOUS at 03:50

## 2021-01-01 RX ADMIN — Medication 10 ML: at 07:51

## 2021-01-01 RX ADMIN — RANOLAZINE 1000 MG: 500 TABLET, FILM COATED, EXTENDED RELEASE ORAL at 17:40

## 2021-01-01 RX ADMIN — WARFARIN SODIUM 6 MG: 5 TABLET ORAL at 16:48

## 2021-01-01 RX ADMIN — Medication 10 ML: at 06:56

## 2021-01-01 RX ADMIN — Medication 10 ML: at 21:40

## 2021-01-01 RX ADMIN — WARFARIN SODIUM 3 MG: 2 TABLET ORAL at 17:45

## 2021-01-01 RX ADMIN — POTASSIUM CHLORIDE 20 MEQ: 750 TABLET, FILM COATED, EXTENDED RELEASE ORAL at 09:51

## 2021-01-01 RX ADMIN — POLYETHYLENE GLYCOL 3350 17 G: 17 POWDER, FOR SOLUTION ORAL at 08:36

## 2021-01-01 RX ADMIN — Medication: at 10:04

## 2021-01-01 RX ADMIN — MICONAZOLE NITRATE 2 % TOPICAL POWDER: at 09:36

## 2021-01-01 RX ADMIN — Medication 10 ML: at 21:46

## 2021-01-01 RX ADMIN — BUMETANIDE 2 MG: 0.25 INJECTION INTRAMUSCULAR; INTRAVENOUS at 09:18

## 2021-01-01 RX ADMIN — BUMETANIDE 2 MG: 0.25 INJECTION INTRAMUSCULAR; INTRAVENOUS at 17:00

## 2021-01-01 RX ADMIN — SODIUM CHLORIDE 50 ML/HR: 9 INJECTION, SOLUTION INTRAVENOUS at 12:13

## 2021-01-01 RX ADMIN — IPRATROPIUM BROMIDE AND ALBUTEROL SULFATE 3 ML: .5; 3 SOLUTION RESPIRATORY (INHALATION) at 19:59

## 2021-01-01 RX ADMIN — MICONAZOLE NITRATE 2 % TOPICAL POWDER: at 09:42

## 2021-01-01 RX ADMIN — ONDANSETRON 4 MG: 2 INJECTION INTRAMUSCULAR; INTRAVENOUS at 05:13

## 2021-01-01 RX ADMIN — HYDROCODONE BITARTRATE AND ACETAMINOPHEN 1 TABLET: 5; 325 TABLET ORAL at 21:44

## 2021-01-01 RX ADMIN — Medication 10 ML: at 15:41

## 2021-01-01 RX ADMIN — HYDRALAZINE HYDROCHLORIDE 100 MG: 50 TABLET, FILM COATED ORAL at 21:30

## 2021-01-01 RX ADMIN — BUDESONIDE AND FORMOTEROL FUMARATE DIHYDRATE 1 PUFF: 160; 4.5 AEROSOL RESPIRATORY (INHALATION) at 10:15

## 2021-01-01 RX ADMIN — PROPOFOL 35 MCG/KG/MIN: 10 INJECTION, EMULSION INTRAVENOUS at 03:05

## 2021-01-01 RX ADMIN — Medication 1 CAPSULE: at 09:47

## 2021-01-01 RX ADMIN — BUDESONIDE AND FORMOTEROL FUMARATE DIHYDRATE 1 PUFF: 160; 4.5 AEROSOL RESPIRATORY (INHALATION) at 21:32

## 2021-01-01 RX ADMIN — IRON SUCROSE 200 MG: 20 INJECTION, SOLUTION INTRAVENOUS at 11:03

## 2021-01-01 RX ADMIN — SODIUM CHLORIDE 200 MG: 9 INJECTION, SOLUTION INTRAVENOUS at 19:47

## 2021-01-01 RX ADMIN — CEPHALEXIN 250 MG: 250 CAPSULE ORAL at 17:57

## 2021-01-01 RX ADMIN — HYDRALAZINE HYDROCHLORIDE 100 MG: 50 TABLET, FILM COATED ORAL at 10:27

## 2021-01-01 RX ADMIN — SODIUM CHLORIDE 100 MG: 9 INJECTION, SOLUTION INTRAVENOUS at 18:52

## 2021-01-01 RX ADMIN — CEPHALEXIN 250 MG: 250 CAPSULE ORAL at 19:22

## 2021-01-01 RX ADMIN — HYDRALAZINE HYDROCHLORIDE 10 MG: 20 INJECTION INTRAMUSCULAR; INTRAVENOUS at 04:02

## 2021-01-01 RX ADMIN — Medication 10 ML: at 23:31

## 2021-01-01 RX ADMIN — IPRATROPIUM BROMIDE AND ALBUTEROL SULFATE 3 ML: .5; 3 SOLUTION RESPIRATORY (INHALATION) at 21:51

## 2021-01-01 RX ADMIN — HYDRALAZINE HYDROCHLORIDE 100 MG: 50 TABLET, FILM COATED ORAL at 08:25

## 2021-01-01 RX ADMIN — SODIUM CHLORIDE 100 MG: 9 INJECTION, SOLUTION INTRAVENOUS at 18:04

## 2021-01-01 RX ADMIN — FAMOTIDINE 20 MG: 20 TABLET ORAL at 08:20

## 2021-01-01 RX ADMIN — CEPHALEXIN 250 MG: 250 CAPSULE ORAL at 10:05

## 2021-01-01 RX ADMIN — Medication 10 ML: at 22:52

## 2021-01-01 RX ADMIN — RANOLAZINE 1000 MG: 500 TABLET, FILM COATED, EXTENDED RELEASE ORAL at 08:41

## 2021-01-01 RX ADMIN — INSULIN GLARGINE 20 UNITS: 100 INJECTION, SOLUTION SUBCUTANEOUS at 21:28

## 2021-01-01 RX ADMIN — PAROXETINE 20 MG: 20 TABLET, FILM COATED ORAL at 10:02

## 2021-01-01 RX ADMIN — Medication 10 ML: at 06:41

## 2021-01-01 RX ADMIN — CEPHALEXIN 250 MG: 250 CAPSULE ORAL at 17:15

## 2021-01-01 RX ADMIN — Medication 10 ML: at 07:13

## 2021-01-01 RX ADMIN — INSULIN LISPRO 2 UNITS: 100 INJECTION, SOLUTION INTRAVENOUS; SUBCUTANEOUS at 09:56

## 2021-01-01 RX ADMIN — HYDRALAZINE HYDROCHLORIDE 100 MG: 50 TABLET, FILM COATED ORAL at 17:09

## 2021-01-01 RX ADMIN — METHYLPREDNISOLONE SODIUM SUCCINATE 40 MG: 40 INJECTION, POWDER, FOR SOLUTION INTRAMUSCULAR; INTRAVENOUS at 11:59

## 2021-01-01 RX ADMIN — HYDRALAZINE HYDROCHLORIDE 10 MG: 20 INJECTION INTRAMUSCULAR; INTRAVENOUS at 20:23

## 2021-01-01 RX ADMIN — LEVETIRACETAM 250 MG: 250 TABLET, FILM COATED ORAL at 18:00

## 2021-01-01 RX ADMIN — BUMETANIDE 2 MG: 0.25 INJECTION INTRAMUSCULAR; INTRAVENOUS at 16:32

## 2021-01-01 RX ADMIN — WARFARIN SODIUM 4 MG: 1 TABLET ORAL at 19:22

## 2021-01-01 RX ADMIN — THERA TABS 1 TABLET: TAB at 08:14

## 2021-01-01 RX ADMIN — CEPHALEXIN 250 MG: 250 CAPSULE ORAL at 17:12

## 2021-01-01 RX ADMIN — Medication 10 ML: at 06:58

## 2021-01-01 RX ADMIN — DEXMEDETOMIDINE HYDROCHLORIDE 0.8 MCG/KG/HR: 400 INJECTION INTRAVENOUS at 08:25

## 2021-01-01 RX ADMIN — ISOSORBIDE MONONITRATE 30 MG: 30 TABLET ORAL at 11:40

## 2021-01-01 RX ADMIN — INSULIN GLARGINE 20 UNITS: 100 INJECTION, SOLUTION SUBCUTANEOUS at 09:34

## 2021-01-01 RX ADMIN — BUDESONIDE AND FORMOTEROL FUMARATE DIHYDRATE 1 PUFF: 160; 4.5 AEROSOL RESPIRATORY (INHALATION) at 07:31

## 2021-01-01 RX ADMIN — RANOLAZINE 1000 MG: 500 TABLET, FILM COATED, EXTENDED RELEASE ORAL at 08:23

## 2021-01-01 RX ADMIN — HEPARIN SODIUM 1100 UNITS: 1000 INJECTION INTRAVENOUS; SUBCUTANEOUS at 18:18

## 2021-01-01 RX ADMIN — HEPARIN SODIUM 1900 UNITS: 1000 INJECTION INTRAVENOUS; SUBCUTANEOUS at 15:22

## 2021-01-01 RX ADMIN — Medication 10 ML: at 22:47

## 2021-01-01 RX ADMIN — HYDRALAZINE HYDROCHLORIDE 100 MG: 50 TABLET, FILM COATED ORAL at 21:33

## 2021-01-01 RX ADMIN — INSULIN GLARGINE 20 UNITS: 100 INJECTION, SOLUTION SUBCUTANEOUS at 22:14

## 2021-01-01 RX ADMIN — FAMOTIDINE 20 MG: 20 TABLET ORAL at 09:59

## 2021-01-01 RX ADMIN — PROPOFOL 45 MCG/KG/MIN: 10 INJECTION, EMULSION INTRAVENOUS at 15:21

## 2021-01-01 RX ADMIN — THERA TABS 1 TABLET: TAB at 10:15

## 2021-01-01 RX ADMIN — Medication 10 ML: at 22:13

## 2021-01-01 RX ADMIN — MICONAZOLE NITRATE 2 % TOPICAL POWDER: at 18:00

## 2021-01-01 RX ADMIN — HYDROCODONE BITARTRATE AND ACETAMINOPHEN 1 TABLET: 5; 325 TABLET ORAL at 11:59

## 2021-01-01 RX ADMIN — Medication: at 09:17

## 2021-01-01 RX ADMIN — RANOLAZINE 1000 MG: 500 TABLET, FILM COATED, EXTENDED RELEASE ORAL at 21:19

## 2021-01-01 RX ADMIN — CEPHALEXIN 250 MG: 250 CAPSULE ORAL at 18:04

## 2021-01-01 RX ADMIN — RANOLAZINE 1000 MG: 500 TABLET, FILM COATED, EXTENDED RELEASE ORAL at 09:57

## 2021-01-01 RX ADMIN — Medication 10 ML: at 21:20

## 2021-01-01 RX ADMIN — PAROXETINE HYDROCHLORIDE 20 MG: 20 TABLET, FILM COATED ORAL at 08:40

## 2021-01-01 RX ADMIN — IPRATROPIUM BROMIDE AND ALBUTEROL SULFATE 3 ML: .5; 3 SOLUTION RESPIRATORY (INHALATION) at 23:17

## 2021-01-01 RX ADMIN — THERA TABS 1 TABLET: TAB at 09:58

## 2021-01-01 RX ADMIN — HYDRALAZINE HYDROCHLORIDE 100 MG: 50 TABLET, FILM COATED ORAL at 10:05

## 2021-01-01 RX ADMIN — Medication: at 21:17

## 2021-01-01 RX ADMIN — HYDRALAZINE HYDROCHLORIDE 100 MG: 50 TABLET, FILM COATED ORAL at 15:24

## 2021-01-01 RX ADMIN — ALBUMIN (HUMAN) 12.5 G: 0.25 INJECTION, SOLUTION INTRAVENOUS at 07:07

## 2021-01-01 RX ADMIN — HYDRALAZINE HYDROCHLORIDE 100 MG: 50 TABLET, FILM COATED ORAL at 21:24

## 2021-01-01 RX ADMIN — HEPARIN SODIUM 1400 UNITS: 1000 INJECTION INTRAVENOUS; SUBCUTANEOUS at 18:18

## 2021-01-01 RX ADMIN — Medication: at 21:48

## 2021-01-01 RX ADMIN — HYDROXYZINE HYDROCHLORIDE 25 MG: 25 TABLET, FILM COATED ORAL at 04:36

## 2021-01-01 RX ADMIN — LEVETIRACETAM 250 MG: 250 TABLET, FILM COATED ORAL at 21:37

## 2021-01-01 RX ADMIN — HYDRALAZINE HYDROCHLORIDE 100 MG: 50 TABLET, FILM COATED ORAL at 09:46

## 2021-01-01 RX ADMIN — Medication: at 09:59

## 2021-01-01 RX ADMIN — BUMETANIDE 2 MG: 0.25 INJECTION INTRAMUSCULAR; INTRAVENOUS at 09:57

## 2021-01-01 RX ADMIN — PAROXETINE 20 MG: 20 TABLET, FILM COATED ORAL at 08:14

## 2021-01-01 RX ADMIN — HYDRALAZINE HYDROCHLORIDE 100 MG: 50 TABLET, FILM COATED ORAL at 23:27

## 2021-01-01 RX ADMIN — ALTEPLASE 2 MG: 2.2 INJECTION, POWDER, LYOPHILIZED, FOR SOLUTION INTRAVENOUS at 10:55

## 2021-01-01 RX ADMIN — RANOLAZINE 1000 MG: 500 TABLET, FILM COATED, EXTENDED RELEASE ORAL at 20:40

## 2021-01-01 RX ADMIN — INSULIN GLARGINE 20 UNITS: 100 INJECTION, SOLUTION SUBCUTANEOUS at 09:13

## 2021-01-01 RX ADMIN — Medication: at 21:05

## 2021-01-01 RX ADMIN — Medication 10 ML: at 06:40

## 2021-01-01 RX ADMIN — FAMOTIDINE 20 MG: 10 INJECTION, SOLUTION INTRAVENOUS at 12:45

## 2021-01-01 RX ADMIN — IPRATROPIUM BROMIDE AND ALBUTEROL SULFATE 3 ML: .5; 3 SOLUTION RESPIRATORY (INHALATION) at 07:36

## 2021-01-01 RX ADMIN — PAROXETINE HYDROCHLORIDE 10 MG: 20 TABLET, FILM COATED ORAL at 09:01

## 2021-01-01 RX ADMIN — PAROXETINE 20 MG: 20 TABLET, FILM COATED ORAL at 12:40

## 2021-01-01 RX ADMIN — ISOSORBIDE MONONITRATE 30 MG: 30 TABLET ORAL at 10:01

## 2021-01-01 RX ADMIN — BUMETANIDE 2 MG: 0.25 INJECTION INTRAMUSCULAR; INTRAVENOUS at 22:14

## 2021-01-01 RX ADMIN — CEPHALEXIN 250 MG: 250 CAPSULE ORAL at 17:43

## 2021-01-01 RX ADMIN — HYDRALAZINE HYDROCHLORIDE 100 MG: 50 TABLET, FILM COATED ORAL at 21:15

## 2021-01-01 RX ADMIN — PAROXETINE HYDROCHLORIDE 20 MG: 20 TABLET, FILM COATED ORAL at 09:27

## 2021-01-01 RX ADMIN — Medication 10 ML: at 14:36

## 2021-01-01 RX ADMIN — BUMETANIDE 2 MG: 0.25 INJECTION INTRAMUSCULAR; INTRAVENOUS at 15:30

## 2021-01-01 RX ADMIN — FAMOTIDINE 20 MG: 20 TABLET ORAL at 08:03

## 2021-01-01 RX ADMIN — BUDESONIDE AND FORMOTEROL FUMARATE DIHYDRATE 1 PUFF: 160; 4.5 AEROSOL RESPIRATORY (INHALATION) at 21:44

## 2021-01-01 RX ADMIN — HEPARIN SODIUM 2500 UNITS: 1000 INJECTION INTRAVENOUS; SUBCUTANEOUS at 13:00

## 2021-01-01 RX ADMIN — Medication 10 ML: at 07:54

## 2021-01-01 RX ADMIN — HEPARIN SODIUM 3800 UNITS: 1000 INJECTION INTRAVENOUS; SUBCUTANEOUS at 13:41

## 2021-01-01 RX ADMIN — ACETAMINOPHEN 650 MG: 325 TABLET, FILM COATED ORAL at 17:16

## 2021-01-01 RX ADMIN — WARFARIN SODIUM 4 MG: 4 TABLET ORAL at 17:01

## 2021-01-01 RX ADMIN — LEVETIRACETAM 250 MG: 100 INJECTION, SOLUTION INTRAVENOUS at 12:45

## 2021-01-01 RX ADMIN — INSULIN GLARGINE 20 UNITS: 100 INJECTION, SOLUTION SUBCUTANEOUS at 09:20

## 2021-01-01 RX ADMIN — HEPARIN SODIUM 1400 UNITS: 1000 INJECTION INTRAVENOUS; SUBCUTANEOUS at 16:55

## 2021-01-01 RX ADMIN — SODIUM CHLORIDE 100 MG: 9 INJECTION, SOLUTION INTRAVENOUS at 18:07

## 2021-01-01 RX ADMIN — CEPHALEXIN 250 MG: 250 CAPSULE ORAL at 10:02

## 2021-01-01 RX ADMIN — HYDROCORTISONE: 1 CREAM TOPICAL at 08:09

## 2021-01-01 RX ADMIN — EPOETIN ALFA-EPBX 20000 UNITS: 20000 INJECTION, SOLUTION INTRAVENOUS; SUBCUTANEOUS at 21:20

## 2021-01-01 RX ADMIN — SODIUM CHLORIDE 50 ML/HR: 9 INJECTION, SOLUTION INTRAVENOUS at 13:00

## 2021-01-01 RX ADMIN — HEPARIN SODIUM 1100 UNITS: 1000 INJECTION INTRAVENOUS; SUBCUTANEOUS at 11:05

## 2021-01-01 RX ADMIN — MICONAZOLE NITRATE 2 % TOPICAL POWDER: at 18:05

## 2021-01-01 RX ADMIN — Medication: at 22:09

## 2021-01-01 RX ADMIN — BUMETANIDE 2 MG: 1 TABLET ORAL at 22:19

## 2021-01-05 NOTE — TELEPHONE ENCOUNTER
1/5/2021 at 2:49 left message call to reschedule 1/7/2021 1:00 with Dr. Vivian Mejia she's out of the office for the week of 1/4 - can reschedule to Gilson Purcell NP or Dr. Melissa Greco next available opening

## 2021-01-07 PROBLEM — R06.02 SOB (SHORTNESS OF BREATH): Status: ACTIVE | Noted: 2021-01-01

## 2021-01-07 PROBLEM — R06.09 DOE (DYSPNEA ON EXERTION): Status: ACTIVE | Noted: 2021-01-01

## 2021-01-07 NOTE — LETTER
1/7/2021 Patient: Franklin Calix YOB: 1950 Date of Visit: 1/7/2021 Manpreet Corbett NP 
[address] [place] [address] Via Fax: [phone] Iraida Barcenas MD 
[address] [place] [address] [address] [facility] [id number] Via In [employer] Dear TEA Best MD, Thank you for referring Ms. Franklin Calix to [address] [place] [address] for evaluation. My notes for this consultation are attached. If you have questions, please do not hesitate to call me. I look forward to following your patient along with you. Sincerely, Sharon Ceron NP

## 2021-01-07 NOTE — PROGRESS NOTES
[address] [facility] [place] [address] [facility] [place] in [facility], [address] [facility] Note Patient name: Teagan Elliott Patient : 1950 Patient MRN: [id number] Date of service: 21 Primary care physician: Jessica Martinez NP 
Primary F cardiologist: Wolf Bautista MD 
  
CHIEF COMPLAINT: 
Chief Complaint Patient presents with  Follow-up LVAD  
 
  
PLAN: 
Continue current device speed 9400rpm; intolerant to higher speed due to VT Expect increasing intolerance to BB/ACEi/ARB/ARNI due to aTTR; on hold for now Application for patient assistance for tafamidis as OP- will mail to patient to complete Hydralazine 100 mg po TID, MAPs today 124-138mmHg Intolerant of spironolactone due to hyperkalemia  
Continue Bumex 1mg daily;  Diuretic management per nephrology Chronic anticoagulation on Coumadin; goal INR lowered to 1.8-2.5 Continue lovenox until INR >2 Continue ranolazine, no ectopy - followed by Dr. 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 
referral for Nutrition sent  
held gabapentin with ROCIO Referral for urogynecology; persistent urinary incontinence h/o high doses of detrol,  
Palliative care consult appreciated; patient remains full code, AMD given to patient/daughter to complete Follow up with Jessica Martinez NP re:blood glucose management Received flu/PNA vaccines at discharge Patient requires 24hr care giver as discussed at discharge, daughter is splitting time with hired EMT as a caregiver. Will need basic LVAD education Follow up with Primary Cardiology, Dr. Cristian Fitzptarick Follow up with pulmonology Dr. Hayley Ayala, COPD, trilogy management, on (2/2/21) Follow up with Douglas Staley NP appointment today Follow up with ID, Dr. Zacarias Anand; chronic Keflex suppression Follow up with [facility] in 1 month IMPRESSION: 
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 Leonela Lui at [facility]) · C/b delayed extubation due to severe COPD 
· C/b critical illness polyneuropathy · C/b prolonged hospitalization post-LVAD, 55 days · C/b sternal wound infection s/p debridement (by Dr. Leonela Lui) s/p wound vac · C/b sacral ulcer · Would culture positive for Staph aureus, not MRSA · C/b LVAD site drainage, improved S/p CRT-ICD · ICD fired due to electrolyte imbalance (2011) H/o breast cancer (1992) · s/p bilateral mastectomy/chemo and endometrial cancer s/p hysterectomy · Lymphedema of LLE due to cancer treatment Severe COPD with FEV1 50% Depression Atrial fibrillation H/o \"two mini strokes\" S/p fall with hip facture · Right hip hemmiarthroplasty (5/23/18) by Dr. Ale Martinez) · S/p removed hip hardwarare due to pain (4/15/19) COPD severe CKD, stage 3 Hyperkalemia Pulmonary hypertension Cardiac risk factors: · Morbid obesity, Body mass index is 51.58 kg/m². · DM2 insulin dependent · JED on Trilogy · HL Urinary incontinence, severe · no procedures due to anticoagulation · conservative management with Detrol 4 pills bid Endometrial cancer (2002) HTN 
HL 
  
CARDIAC IMAGING: 
Echo (9/24/19) LVEF 20-25%, AV opens 1:1, no AR Echo (18) LVEF 10-%, ramps study done, report in [facility] Echo (18) ramp study done, LVEDD 7.1cm C (18) 2 vessel disease with 90% OM, 80% PDA, DSA to PDA branch 
  
HEMODYNAMICS: 
RHC not done CPEST not done 6MW not done 
  
OTHER IMAGING: 
EGD/C-scope (19) no active bleeding and polyp removed. 
  
FAMILY HISTORY: 
Mother  76 years, diagnosed with DM, HTN, CAD Father  [de-identified]years old, HTN, CAD, MI 
Positive for DM and heart disease in the family, brother with valve replacement 
  
SOCIAL HISTORY: 
Never alcohol, never smoked, , lives alone, no illicit drug use, lives in house, ambulatory status indpendedn, children: daughter, occupation retired Lives alone. 
  
ALLERGIES: benzodiazepines and quetiapine fumerate (lethargy, resp failure to both) 
  
HISTORY OF PRESENT ILLNESS: 
I had the pleasure of seeing Flavio Choi in [address] [place] [address] at [facility] in [address] [place] [address], Flavio Choi is a 78 y.o. female with multiple comorbidities listed above presents to our clinic requesting transfer of care from [facility] LVAD program. 
  
INTERVAL HISTORY: 
Today, Mrs. Germain presents for hospital follow up accompanied by her daughter. Overall she is doing fairly well. She has been out of her Symbicort for a few days and is wheezing, she has an appointment with her PCP today. She continues to work with PT 3 days a week at home and has completed OT therapy. Reports they will return once she gets a shower bar to assist with how to use it. She still remains dyspneic walking short distances and reliant on her walker. Her MAP in clinic today is 98mmHg, she is  pulsatile . Patient reports she can perform home activities without problem. Patient would like to remain independent as long as she can; she lives alone.   Her daughter is alternating with another EMT to provide her for  care and is looking into hiring more care givers for assistance.        
  
Mrs. Sathya Shankar denies abdominal bloating, has improved BLE edema. Patient's weight today is 282lbs, she with good appetite. Reports occasional constipation, she associates with her iron tablets.     
Patient denies orthopnea, PND or nocturia. Sleeps in recliner. 
  
Patient denies irregular heart rate or palpitations. ICD has not fired.   
  
Patient denies other cardiac symptoms such as chest pain or leg pain with walking.  
  
Patient is compliant with fluid restriction and taking medications as prescribed. Patient manages her own medications. REVIEW OF SYSTEMS: 
General: Denies fever, night sweats. Ear, nose and throat: Denies difficulty hearing, sinus problems, runny nose, post-nasal drip, ringing in ears, mouth sores, loose teeth, ear pain, nosebleeds, sore throate, facial pain or numbess Cardiovascular: see above in the interval history Respiratory: Denies cough, wheezing, sputum production, hemoptysis. Gastrointestinal: Denies heartburn, constipation, intolerance to certain foods, diarrhea, abdominal pain, nausea, vomiting, difficulty swallowing, blood in stool Kidney and bladder: Denies painful urination, frequent urination Musculoskeletal: Denies joint pain, muscle weakness Skin and hair: Denies change in hair loss or increase, breast changes improved Redness and edema, skin breakdown RLE, LLE with healing wound PHYSICAL EXAM: 
Visit Vitals BP (!) 98/0 (BP 1 Location: Right arm, BP Patient Position: Sitting) Comment (BP 1 Location): using forearm, radial pulse present Pulse 91 Temp 98 °F (36.7 °C) (Oral) Resp 20 Ht 5' 2\" (1.575 m) Wt 282 lb (127.9 kg) SpO2 99% BMI 51.58 kg/m² LVAD Pump Speed (RPM): 9400 Pump Flow (LPM): 5.5 MAP: 98 
PI (Pulsitility Index): 5.8 Power: 5.9 Back Up  at Bedside & Labeled: Yes Outpatient: Yes 
MAP in Therapeutic Range (Outpatient): Yes Testing Alarms Reviewed: Yes 
Back up SC speed: 9400 Back up Low Speed Limit: 9200 Emergency Equipment with Patient?: Yes Emergency procedures reviewed?: Yes General: Patient is morbidly obese in no acute distress HEENT: Normocephalic and atraumatic. No scleral icterus. Pupils are equal, round and reactive to light and accomodation. No conjunctival injection. Oropharynx is clear. Neck: Supple. No evidence of thyroid enlargements or lymphadenopathy. JVD: Cannot be appreciated Lungs: Breath sounds are equal and clear bilaterally. No wheezes, rhonchi, or rales. Heart: VAD hum Abdomen: Soft, pannus, no mass or tenderness. No organomegaly or hernia. Bowel sounds present. Genitourinary and rectal: deferred Extremities: No cyanosis, clubbing, improved Redness and edema, skin breakdown RLE; BLE edema 2+, venous stasis, LLE wrapped with ACE wrap Neurologic: No focal sensory or motor deficits are noted. Grossly intact. Psychiatric: Awake, alert and oriented x 3. Appropriate mood and affect. Skin: Warm, dry and well perfused. No lesions, nodules or rashes are noted. PAST MEDICAL HISTORY: 
Past Medical History:  
Diagnosis Date  Asthma  Cancer ([facility] [address] breast  
 Cancer ([facility] [address] [facility]   
 endometrial  
 Congestive heart failure, unspecified  CRI (chronic renal insufficiency)  Depression  Diabetes ([facility] [address] [facility]  Hypertension PAST SURGICAL HISTORY: 
Past Surgical History:  
Procedure Laterality Date  HX HERNIA REPAIR    
 HX HYSTERECTOMY  HX MASTECTOMY FAMILY HISTORY: 
No family history on file. SOCIAL HISTORY: 
Social History Socioeconomic History  Marital status:  Spouse name: Not on file  Number of children: Not on file  Years of education: Not on file  Highest education level: Not on file Tobacco Use  Smoking status: Never Smoker  Smokeless tobacco: Never Used Substance and Sexual Activity  Alcohol use: Not Currently LABORATORY RESULTS: 
Labs Latest Ref Rng & Units 11/20/2020 11/19/2020 11/18/2020 11/17/2020 11/16/2020 11/15/2020 11/14/2020 WBC 3.6 - 11.0 K/uL 5.1 6.2 6.3 6.9 7.7 5.4 5.5  
RBC 3.80 - 5.20 M/uL 2.76(L) 3.06(L) 2.96(L) 2.90(L) 3.09(L) 3.01(L) 2.87(L) Hemoglobin 11.5 - 16.0 g/dL 7. 9(L) 8.4(L) 8. 3(L) 8. 1(L) 8.6(L) 8. 3(L) 8.2(L) Hematocrit 35.0 - 47.0 % 25. 4(L) 28. 0(L) 27. 7(L) 26. 4(L) 28. 4(L) 27. 4(L) 26. 4(L) MCV 80.0 - 99.0 FL 92.0 91.5 93.6 91.0 91.9 91.0 92.0 Platelets 821 - 708 K/uL 149(L) 178 167 162 174 172 164 Lymphocytes 12 - 49 % 16 - - - - - - Monocytes 5 - 13 % 8 - - - - - - Eosinophils 0 - 7 % 10(H) - - - - - - Basophils 0 - 1 % 1 - - - - - - Albumin 3.5 - 5.0 g/dL 3. 0(L) 3. 1(L) 3.0(L) 2. 9(L) 3. 1(L) 2. 9(L) 2. 9(L) Calcium 8.5 - 10.1 MG/DL 8.9 8.9 8.6 8.8 8.9 9.1 8.6 Glucose 65 - 100 mg/dL 152(H) 118(H) 121(H) 131(H) 128(H) 127(H) 130(H) BUN 6 - 20 MG/DL 53(H) 52(H) 51(H) 50(H) 55(H) 59(H) 69(H) Creatinine 0.55 - 1.02 MG/DL 2.60(H) 2.57(H) 2.58(H) 2.51(H) 2.67(H) 2.50(H) 2.69(H) Sodium 136 - 145 mmol/L 140 141 140 140 140 141 142 Potassium 3.5 - 5.1 mmol/L 4.4 4.7 4.8 4.6 5.2(H) 4.9 5.1 LDH 81 - 246 U/L 191 201 246 205 226 217 240 Some recent data might be hidden ALLERGY: 
No Known Allergies CURRENT MEDICATIONS: 
 
Current Outpatient Medications:  
  PARoxetine (PAXIL) 30 mg tablet, Take 30 mg by mouth daily. , Disp: , Rfl:  
  warfarin (COUMADIN) 5 mg tablet, Take 1 Tab by mouth daily. Take per INR tracker, Disp: 45 Tab, Rfl: 5 
  enoxaparin (Lovenox) 120 mg/0.8 mL injection, 120 mg by SubCUTAneous route every twelve (12) hours. , Disp: 12 Syringe, Rfl: 2 
  ferrous sulfate (Iron) 325 mg (65 mg iron) tablet, Take 1 Tab by mouth daily (with breakfast). , Disp: 30 Tab, Rfl: 1 
  albuterol (PROVENTIL HFA, VENTOLIN HFA, PROAIR HFA) 90 mcg/actuation inhaler, Take 2 Puffs by inhalation every four (4) hours as needed for Wheezing., Disp: 1 Inhaler, Rfl: 1   ranolazine ER (RANEXA) 500 mg SR tablet, TAKE 2 TABLETS BY MOUTH TWICE DAILY, Disp: 360 Tab, Rfl: 0 
  hydrALAZINE (APRESOLINE) 25 mg tablet, Take 4 Tabs by mouth three (3) times daily. , Disp: 360 Tab, Rfl: 2 
  bumetanide (BUMEX) 1 mg tablet, Take 1 Tab by mouth daily. May take an additonal 1mg daily PRN for weight gain of 2-3lbs overnight or 5lbs in one week., Disp: 30 Tab, Rfl: 0 
  cephALEXin (Keflex) 500 mg capsule, Take 1 Cap by mouth two (2) times a day., Disp: 60 Cap, Rfl: 0 
  tafamidis (Vyndamax) 61 mg cap, Take 61 mg by mouth daily. , Disp: 30 Cap, Rfl: 2 
  magnesium oxide (MAG-OX) 400 mg tablet, Take 1 Tab by mouth daily. , Disp: 30 Tab, Rfl: 5 
  budesonide-formoteroL (Symbicort) 160-4.5 mcg/actuation HFAA, Take 2 Puffs by inhalation two (2) times daily as needed. , Disp: , Rfl:  
  hydrOXYzine HCL (ATARAX) 10 mg tablet, Take 10 mg by mouth three (3) times daily as needed for Itching (hives). , Disp: , Rfl:  
  warfarin (COUMADIN) 1 mg tablet, Take 4 mg by mouth daily. , Disp: , Rfl:  
 
Thank you for your referral and allowing me to participate in this patient's care. TEA Perea [address], [address] Phone: [phone] Fax: [phone] PATIENT CARE TEAM: 
Patient Care Team: 
Maisha Cisneros NP as PCP - General (Nurse Practitioner) 40 min

## 2021-01-08 NOTE — TELEPHONE ENCOUNTER
Called and reviewed labs with patient. Ty Stack verbalized understanding to continue taking Magnesium supplement, and to follow a low Na+ diet (avoiding fast food/processed food).

## 2021-01-08 NOTE — TELEPHONE ENCOUNTER
Labs reviewed: NTproBNP 5272- monitor, ensure she is following low Na+ diet , avoiding fast food/processed food.  mag 2.1 continue taking magnesium daily.  Rest of labs stable

## 2021-01-13 NOTE — TELEPHONE ENCOUNTER
Telephone Call RE:  Lab Reminder      Outcome:     [x] Patient verbalizes understanding    [] Unable to reach   [] Left message              []     Requesting patient check her INR in the AM      [facility]

## 2021-01-18 NOTE — PROGRESS NOTES
Braxton, Triny B, NP  You 15 minutes ago (12:24 PM) Hold x 2 nights, resume 4mg on wed and check on thurs Routing comment Patient notified. She had no further questions or concerns. Benito Jones RN.

## 2021-01-20 NOTE — TELEPHONE ENCOUNTER
Telephone Call RE:  Lab Reminder      Outcome:     [x] Patient verbalizes understanding    [] Unable to reach   [] Left message              []     Requesting patient check her INR in the AM      [facility]

## 2021-01-26 NOTE — TELEPHONE ENCOUNTER
Telephone Call RE:  Lab Reminder      Outcome:     [x] Patient verbalizes understanding    [] Unable to reach   [] Left message              []     Requesting patient check her INR in the AM      [facility]

## 2021-02-02 NOTE — TELEPHONE ENCOUNTER
Telephone Call RE:  Lab Reminder      Outcome:     [x] Patient verbalizes understanding    [] Unable to reach   [] Left message              []     Requesting patient to check her INR in the AM      [facility]

## 2021-02-02 NOTE — TELEPHONE ENCOUNTER
Reviewed recent labs. Notable for Cr 2.09 (2.10 on 1/7) and elevated proBNP (6669, most recently 5272 on 1/7). Will inquire re: weight, symptoms. INR not done, will be checked tomorrow.

## 2021-02-03 NOTE — TELEPHONE ENCOUNTER
Spoke with patient who reported weight has been fluctuating between 276lb-278lbs and is at 278lbs today. She denied any shortness of breath, swelling, dizziness, or lightheadedness. She did report earlier this week she felt \"drained,\" and had cramping in the legs so she took half of a bumex that day, felt better and resumed her regular bumex dosing the next day. She stated she has felt well since and feels \"really good today. \" She did report loss of appetite lately. She stated she only ate a small hamburger yesterday and nothing else. She stated she has been trying to eat, but when she goes to cook something she \"feels sick to my stomach. \" She reported on average she has been drinking two bottles of water daily, but yesterday drank mostly cranberry grape 50 calorie drink. She stated [facility] RN visited today and commented on \"how good my ankles looked. \" BP by home health 120/98 per patient. She confirmed she is taking all medications as written. Informed her will review with provider and contact her only if changes recommended. Patient educated to continue current plan of care unless otherwise advised. Patient verbalized understanding and had no further questions. Warren Strauss RN.

## 2021-02-03 NOTE — TELEPHONE ENCOUNTER
Yoel Vázquez NP  You 42 minutes ago (12:10 PM)     If she feels well, let's continue current regimen and repeat labs next week please. Michelle Fletcher you! Message text      [address] home health to request repeat labs next week. Orders placed. Per [facility] sent staff orders to be faxed to [phone]. Orders faxed. Attempted to contact patient to notify of plan. Voicemail full. Unable to leave message. Patient returned call. Informed her of recommendations. She verbalized understanding and had no further questions. Kathryn Irvin RN.

## 2021-02-04 NOTE — TELEPHONE ENCOUNTER
Telephone Call RE:  Lab Reminder      Outcome:     [x] Patient verbalizes understanding    [] Unable to reach   [] Left message              []     I called patient to remind her to check her INR in the morning.     Noé Hong

## 2021-02-05 NOTE — TELEPHONE ENCOUNTER
1206 - Received message from Florencio Jesus (patient's Orion Hunter RN) who stated that patient's BP is 124/90 and \"doppler BP\" is 116, 3+ pitting edema to BLE, and 2 lb weight gain. Called and spoke with patient who states she has had a 4 lb weight gain since Monday. She denies sob, but states \"I haven't really been up and around\". Patient asked if she could take an extra Bumex tablet. Advised patient that as prescribed she can take an extra 1mg of Bumex for weight gain of 2-3 lbs over night or 5 lbs in 1 week. Patient states she would like to check in with the NP before taking the extra Bumex. 5636 - patient verbalized understanding to take additional bumex tablet.

## 2021-02-09 NOTE — PROGRESS NOTES
[address] [place] [address] [place] [address] [place] [address] [place] [address] [facility] Note Patient name: Saud Ivna Patient : 1950 Patient MRN: [id number] Date of service: 21 Primary care physician: Tim Youssef NP 
Primary [facility] cardiologist: Nisa Davenport MD 
  
CHIEF COMPLAINT: 
Chief Complaint Patient presents with  CHF  Leg Swelling  Shortness of Breath  
 
  PLAN: 
Continue current device speed 9400rpm; intolerant to higher speed due to VT Expect increasing intolerance to BB/ACEi/ARB/ARNI due to aTTR; on hold for now Application for patient assistance for tafamidis as OP- will mail to patient to complete Hydralazine 100 mg po TID, MAPs today 124-138mmHg Intolerant of spironolactone due to hyperkalemia  
Continue Bumex 1mg daily;  Diuretic management per nephrology Chronic anticoagulation on Coumadin; goal INR lowered to 1.8-2.5 Continue ranolazine, no ectopy - followed by Dr. 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 Will need basic LVAD education Follow up with Primary Cardiology, Dr. Gautam Cunha Follow up with pulmonology Dr. Thomas Rucker, COPD, trilogy management, on (2/21) Follow up with Yaritza Larose NP Follow up with ID, Dr. Peg Homans; chronic Keflex suppression Follow up with [facility] in 1 month  
TTE in April 2021 IMPRESSION: 
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 Ragini Spencer at [facility]) · C/b delayed extubation due to severe COPD 
· C/b critical illness polyneuropathy · C/b prolonged hospitalization post-LVAD, 55 days · C/b sternal wound infection s/p debridement (by Dr. Ragini Spencer) s/p wound vac · C/b sacral ulcer · Would culture positive for Staph aureus, not MRSA · C/b LVAD site drainage, improved S/p CRT-ICD · ICD fired due to electrolyte imbalance (2011) H/o breast cancer (1992) · s/p bilateral mastectomy/chemo and endometrial cancer s/p hysterectomy · Lymphedema of LLE due to cancer treatment Severe COPD with FEV1 50% Depression Atrial fibrillation H/o \"two mini strokes\" S/p fall with hip facture · Right hip hemmiarthroplasty (5/23/18) by Dr. Jono Gage) · S/p removed hip hardwarare due to pain (4/15/19) COPD severe CKD, stage 3 Hyperkalemia Pulmonary hypertension Cardiac risk factors: · Morbid obesity, Body mass index is 51.58 kg/m². · DM2 insulin dependent · JED on Trilogy · HL Urinary incontinence, severe · no procedures due to anticoagulation · conservative management with Detrol 4 pills bid Endometrial cancer (2002) HTN 
HL 
  
CARDIAC IMAGING: 
Echo (9/24/19) LVEF 20-25%, AV opens 1:1, no AR Echo (5/29/18) LVEF 10-%, ramps study done, report in epic Echo (18) ramp study done, LVEDD 7.1cm [facility] (18) 2 vessel disease with 90% OM, 80% PDA, DSA to PDA branch 
  
HEMODYNAMICS: 
RHC not done CPEST not done 6MW not done 
  
OTHER IMAGING: 
EGD/C-scope (19) no active bleeding and polyp removed. 
  
FAMILY HISTORY: 
Mother  76 years, diagnosed with DM, HTN, CAD Father  [de-identified]years old, HTN, CAD, MI 
Positive for DM and heart disease in the family, brother with valve replacement 
  
SOCIAL HISTORY: 
Never alcohol, never smoked, , lives alone, no illicit drug use, lives in house, ambulatory status indpendedn, children: daughter, occupation retired Lives alone. 
  
ALLERGIES: benzodiazepines and quetiapine fumerate (lethargy, resp failure to both) 
  
HISTORY OF PRESENT ILLNESS: 
I had the pleasure of seeing Gloria Negrete in [address] [place] [address] at [address] [place] [address] in [address] [place] [address], Gloria Negrete is a 78 y.o. female with multiple comorbidities listed above presents to our clinic requesting transfer of care from [facility] LVAD program. 
  
INTERVAL HISTORY: 
Today, Mrs. Germain presents for LVAD follow up accompanied by her daughter. She states she is doing well overall except for her R leg which has opened up again on her shin. She is working with PT weekly, completed her time with OT. She is compliant with her medications, denies acute HF symptoms. Drive line site in intact at this time. She notes a decreased appetite but weights are stable between 276-280 at home. She continues to have home health.   
 
 
REVIEW OF SYSTEMS: 
General: Denies fever, night sweats. Ear, nose and throat: Denies difficulty hearing, sinus problems, runny nose, post-nasal drip, ringing in ears, mouth sores, loose teeth, ear pain, nosebleeds, sore throate, facial pain or numbess Cardiovascular: see above in the interval history Respiratory: Denies cough, wheezing, sputum production, hemoptysis.  
Gastrointestinal: Denies heartburn, constipation, intolerance to certain foods, diarrhea, abdominal pain, nausea, vomiting, difficulty swallowing, blood in stool Kidney and bladder: Denies painful urination, frequent urination Musculoskeletal: Denies joint pain, muscle weakness Skin and hair: Denies change in hair loss or increase, breast changes improved Redness and edema, skin breakdown RLE, LLE with healing wound PHYSICAL EXAM: 
Visit Vitals BP (!) 82/0 (BP 1 Location: Right upper arm, BP Patient Position: Sitting, BP Cuff Size: Large adult) Pulse 88 Temp 98.5 °F (36.9 °C) (Oral) Resp 18 Wt 282 lb (127.9 kg) SpO2 95% BMI 51.58 kg/m² LVAD Pump Speed (RPM): 9400 Pump Flow (LPM): 5.7 PI (Pulsitility Index): 5.1 Power: 6.1 Back Up  at Bedside & Labeled: Yes Testing Alarms Reviewed: Yes 
Back up SC speed: 9400 Back up Low Speed Limit: 9200 Emergency Equipment with Patient?: Yes Emergency procedures reviewed?: Yes Results for orders placed or performed in visit on 02/09/21 NE INTERROGATION VAD IN PRSON W/PHYS/QHP ANALYSIS Narrative Alarm history reviewed. Please see VAD flow sheet for readings. No PI events or adverse alarms noted. Settings reviewed, but no changes made. General: Patient is morbidly obese in no acute distress HEENT: Normocephalic and atraumatic. No scleral icterus. Pupils are equal, round and reactive to light and accomodation. No conjunctival injection. Oropharynx is clear. Neck: Supple. No evidence of thyroid enlargements or lymphadenopathy. JVD: Cannot be appreciated Lungs: Breath sounds are equal and clear bilaterally. No wheezes, rhonchi, or rales. Heart: VAD hum Abdomen: Soft, pannus, no mass or tenderness. No organomegaly or hernia. Bowel sounds present. Genitourinary and rectal: deferred Extremities: No cyanosis, clubbing, improved Redness and edema, skin breakdown RLE; BLE edema 2+, venous stasis, LLE wrapped with ACE wrap Neurologic: No focal sensory or motor deficits are noted. Grossly intact. Psychiatric: Awake, alert and oriented x 3. Appropriate mood and affect. Skin: Warm, dry and well perfused. No lesions, nodules or rashes are noted. PAST MEDICAL HISTORY: 
Past Medical History:  
Diagnosis Date  Asthma  Cancer ([facility] breast  
 [facility]   
 endometrial  
 Congestive heart failure, unspecified  CRI (chronic renal insufficiency)  Depression  Diabetes ([facility]  Hypertension PAST SURGICAL HISTORY: 
Past Surgical History:  
Procedure Laterality Date  HX HERNIA REPAIR    
 HX HYSTERECTOMY  HX MASTECTOMY FAMILY HISTORY: 
No family history on file. SOCIAL HISTORY: 
Social History Socioeconomic History  Marital status:  Spouse name: Not on file  Number of children: Not on file  Years of education: Not on file  Highest education level: Not on file Tobacco Use  Smoking status: Never Smoker  Smokeless tobacco: Never Used Substance and Sexual Activity  Alcohol use: Not Currently LABORATORY RESULTS: 
Labs Latest Ref Rng & Units 1/7/2021 WBC 3.4 - 10.8 x10E3/uL 6.5  
RBC 3.77 - 5.28 x10E6/uL 3.18(L) Hemoglobin 11.1 - 15.9 g/dL 9.4(L) Hematocrit 34.0 - 46.6 % 28. 5(L) MCV 79 - 97 fL 90 Platelets 471 - 727 N94H0/ Albumin 3.8 - 4.8 g/dL 3.9 Calcium 8.7 - 10.3 mg/dL 9.0 Glucose 65 - 99 mg/dL 159(H) BUN 8 - 27 mg/dL 31(H) Creatinine 0.57 - 1.00 mg/dL 2.10(H) Sodium 134 - 144 mmol/L 142 Potassium 3.5 - 5.2 mmol/L 5.0  - 226 IU/L 226 Some recent data might be hidden ALLERGY: 
No Known Allergies CURRENT MEDICATIONS: 
 
Current Outpatient Medications:  
  insulin detemir (LEVEMIR U-100 INSULIN SC), 10 Units by SubCUTAneous route two (2) times a day., Disp: , Rfl:  
  ranolazine ER (RANEXA) 500 mg SR tablet, TAKE 2 TABLETS BY MOUTH TWICE DAILY, Disp: 360 Tab, Rfl: 0 
  PARoxetine (PAXIL) 30 mg tablet, Take 20 mg by mouth daily. , Disp: , Rfl:  
  warfarin (COUMADIN) 5 mg tablet, Take 1 Tab by mouth daily. Take per INR tracker, Disp: 45 Tab, Rfl: 5 
  enoxaparin (Lovenox) 120 mg/0.8 mL injection, 120 mg by SubCUTAneous route every twelve (12) hours. , Disp: 12 Syringe, Rfl: 2 
  ferrous sulfate (Iron) 325 mg (65 mg iron) tablet, Take 1 Tab by mouth daily (with breakfast). , Disp: 30 Tab, Rfl: 1 
  albuterol (PROVENTIL HFA, VENTOLIN HFA, PROAIR HFA) 90 mcg/actuation inhaler, Take 2 Puffs by inhalation every four (4) hours as needed for Wheezing., Disp: 1 Inhaler, Rfl: 1 
  hydrALAZINE (APRESOLINE) 25 mg tablet, Take 4 Tabs by mouth three (3) times daily. , Disp: 360 Tab, Rfl: 2 
  bumetanide (BUMEX) 1 mg tablet, Take 1 Tab by mouth daily. May take an additonal 1mg daily PRN for weight gain of 2-3lbs overnight or 5lbs in one week., Disp: 30 Tab, Rfl: 0 
  cephALEXin (Keflex) 500 mg capsule, Take 1 Cap by mouth two (2) times a day., Disp: 60 Cap, Rfl: 0 
  tafamidis (Vyndamax) 61 mg cap, Take 61 mg by mouth daily. , Disp: 30 Cap, Rfl: 2 
  magnesium oxide (MAG-OX) 400 mg tablet, Take 1 Tab by mouth daily. , Disp: 30 Tab, Rfl: 5 
  budesonide-formoteroL (Symbicort) 160-4.5 mcg/actuation HFAA, Take 2 Puffs by inhalation two (2) times daily as needed. , Disp: , Rfl:  
  hydrOXYzine HCL (ATARAX) 10 mg tablet, Take 10 mg by mouth three (3) times daily as needed for Itching (hives). , Disp: , Rfl:  
  warfarin (COUMADIN) 1 mg tablet, Take 4 mg by mouth daily. , Disp: , Rfl:  
 
Thank you for your referral and allowing me to participate in this patient's care. TEA Alvarez [address] [place] [address], [address] Phone: [phone] PATIENT CARE TEAM: 
Patient Care Team: 
Douglas Staley NP as PCP - General (Nurse Practitioner)

## 2021-02-09 NOTE — PROGRESS NOTES
Spoke with Laura Vasquez at [facility]. Labs ordered to be drawn tomorrow (2/10/21). Wound care orders reviewed for existing wound(s) on LLE and new wounds on RLE. Called and spoke with PCR at Dr. Ita Fabian office requesting an appointment for patient.

## 2021-02-09 NOTE — PATIENT INSTRUCTIONS
Medication changes: Please take an extra bumex this evening Take Coumadin 2mg ( 2 tabs) every evening No other changes today Please take this to your pharmacy to notify them of the change in medications. Testing Ordered: 
 
Labs with home health RN tomorrow INR on 2/15 Other Recommendations:  
 
Please increase your protein intake- try the Ensure Clears Continue to change drive line exit site dressing 3 times a week using sterile technique, Ensure you are drinking an adequate amount of water with a goal of 6-8 eight ounce glasses (1.5-2 liters) of fluid daily. Your urine should be clear and light yellow straw colored. Follow up in 4 weeks with Beatriz Amador1 Please bring your daily sheets and medications to your next appointment. Please monitor your weights daily upon waking and after using the bathroom. Keep a written records of your weights and bring to your next appointment. If you have a weight gain of 3 or more pounds overnight OR 5 or more pounds in one week please contact our office. Thank you for allowing us the privilege of being a part of your healthcare team! Please do not hesitate to contact our office at [phone] with any questions or concerns.

## 2021-02-17 NOTE — TELEPHONE ENCOUNTER
Contacted patient to discuss the following: In anticipation of the upcoming inclement weather, we would like to communicate an emergency plan regarding your HeartMate II / HeartMate 3 LVAD. A. In preparation for the upcoming storm:  1. Have all batteries fully charged. 2. Have a cell phone (fully charged) and flashlight available. 3. Know the contact number for a local place that will definitely have power if you lose power in your home (friend with a generator, fire station, shelter, hospital, etc.). 4. In the event of a snow emergency, please identify your snow removal plan. If you must shovel the snow yourself please take frequent breaks and keep controller/batteries dry. B. In the event of a power outage and you are connected to your power module:  1. Place yourself onto battery. 2. If the power does not come back in 1-2 hours, your power module battery is at risk. You have two options to preserve it:  a. Find a power source and plug-in. OR  b. Remove the internal battery. To remove your internal battery,  follow these steps  i. Unplug power module, press silence button. ii. Use [employer] head screwdriver on the back panel to remove plate.  iii. Continue to use screwdriver on metal cage that holds the big black battery in place. iv. Remove big black battery and disconnect from the small clip on the left side. This will save the big black battery from losing power. v. After power is restored, you will reconnect the battery to the small clip inside. You will then screw the battery back in the cage and secure the gray outer plate. vi. Plug power module back into the wall. You should see a green [place] and a yellow battery on the front of the power module. If this does not work, try the procedure again. If it continues to fail after several attempts, call the [facility] Coordinator on-call at [phone]. C.  In the event of a power outage and you are connected to your mobile power unit:    Unplug from the wall and ensure you have an extra set of AA batteries in case they need to be replaced. D. In the event of an extended power outage and you find yourself with only 2 sets of fully charged batteries left,  it is time to find a place to charge your equipment:  1. Take ALL equipment (power module and or mobile power unit, , batteries, backup controller, etc.) to a place that definitely has power. a. You can use this place to recharge your batteries. b. Anika Bingham can also reassemble your power module and keep it plugged-in at this location. c. If you can stay overnight, hook up to your power module to preserve your battery life. 2. IF YOU CANNOT DRIVE, CALL [phone].   a. Explain that you have a Left Ventricular Assist Device--an electrically powered heart pump.   Tell dispatcher that you will need transportation to a place with power (i.e. EMS/Fire station or hospital) and that it is life-threatening.   b. If you have any issues, call the [facility] Coordinator on-call at [phone]. Patient notified. She verbalized understanding and had no further questions. Apple Chan RN.

## 2021-02-22 NOTE — TELEPHONE ENCOUNTER
2/22/2021 appointment rescheduled with patients daughter - in office appointment with Dr. Filomena Hammans on 4/8/2021    Future Appointments   Date Time Provider Mello Pacheco   3/15/2021 11:00 AM Sam Sue NP [facility] BS AMB   3/16/2021  1:20 PM [email or web address], DORIS NAGEL BS AMB   3/16/2021  1:40 PM MD ROBE Islas BS AMB   4/8/2021 10:40 AM MD ROBE Dee BS AMB

## 2021-02-22 NOTE — TELEPHONE ENCOUNTER
2/22/2021 at 2:32 spoke with patient she'll have her daughter call to reschedule 3/16/2021 appointment with Dr. Dian Riddle - I advised patient that we need to leave pacemaker and Dr. Rupesh Joseph appointments as scheduled on 3/16/2021 - Dr. Dian Riddle hospital coverage week of 3/15/2021 - Dr. Dian Riddle AM appointments only on 3/16/2021

## 2021-02-22 NOTE — TELEPHONE ENCOUNTER
Patient's daughter, Melinda, is requesting a call back from you at [phone].     Phone: [phone]

## 2021-02-25 NOTE — TELEPHONE ENCOUNTER
Requested Prescriptions     Signed Prescriptions Disp Refills    hydrALAZINE (APRESOLINE) 25 mg tablet 1080 Tab 0     Sig: Take 4 Tabs by mouth three (3) times daily.      Authorizing Provider: Gissell Boles     Ordering User: Candie Arriola

## 2021-02-26 NOTE — TELEPHONE ENCOUNTER
Telephone Call RE:  Lab Reminder      Outcome:     [x] Patient verbalizes understanding    [] Unable to reach   [] Left message              []     Requesting patient check her INR on Monday    Andie Saeed

## 2021-03-09 NOTE — TELEPHONE ENCOUNTER
[id number] - Received a call from Jake at [facility]. States patient's weight is better and down 5 lbs (275lbs), decreased swelling in BLE with aid of compression wraps, wounds to BLE are measuring smaller and MAP is in WNL. However, she states patient's HR has been 110-120 and patient c/o HAIRSTON. Called and spoke with patient who states she \"has a different kind of SOB than in the past\". She states \"this SOB feels heavy\". However, she takes frequent rest breaks and is ok. Asked patient if she would like to come in sooner than her 3/15/21 appointment. Patient declined to be seen sooner, but will call if her symptoms worsen. [place] and spoke with patient. Advised (per Joana Yan NP) to be seen and have ICD interrogated or EKG completed to check for Afib. Patient states she has an appointment with Dr. Mariano Hensley and will call. Message sent to Dr. Charlie Urban nurse to check in with her.

## 2021-03-15 NOTE — PROGRESS NOTES
[address] [place] [address] [facility] [place] [address] [place] [address] [facility] [address] [facility] Note Patient name: Ace Niece Patient : 1950 Patient MRN: [id number] Date of service: 03/15/21 Primary care physician: May Loaiza NP 
Primary [facility] cardiologist: Ariana Gillis MD 
  
CHIEF COMPLAINT: 
Chief Complaint Patient presents with  CHF  Breathing Problem Increasing. Noticing more on exertion  
 
  
PLAN: 
Continue current device speed 9400rpm; intolerant to higher speed due to VT Expect increasing intolerance to BB/ACEi/ARB/ARNI due to aTTR; on hold for now Continue tafamidis 61 mg PO daily; PA completed Hydralazine 100 mg po TID, MAPs today 100 mmHg (intermittently palpable radial pulse) Intolerant of spironolactone due to hyperkalemia  
Transition from Bumex to torsemide 50 mg PO daily d/t abdominal distention Chronic anticoagulation on Coumadin; goal INR lowered to 1.8-2.5 Continue ranolazine, no ectopy - followed by Dr. Shaun Quiroga Antibiotics per ID- continue Keflex po 
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 Raul Carrizales - has not been able to see her yet due to multiple provider initiated cancellations Follow up with pulmonology Dr. Frannie Gamino, COPD, trilogy management Follow up with Yaritza Larose, NP Follow up with ID, Dr. Peg Homans; chronic Keflex suppression Follow up with [facility] in 1 month 
TTE in April 2021 IMPRESSION: 
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 Ragini Spencer at [facility]) · C/b delayed extubation due to severe COPD 
· C/b critical illness polyneuropathy · C/b prolonged hospitalization post-LVAD, 55 days · C/b sternal wound infection s/p debridement (by Dr. Ragini Spencer) s/p wound vac · C/b sacral ulcer · Would culture positive for Staph aureus, not MRSA · C/b LVAD site drainage, improved S/p CRT-ICD · ICD fired due to electrolyte imbalance (2011) H/o breast cancer (1992) · s/p bilateral mastectomy/chemo and endometrial cancer s/p hysterectomy · Lymphedema of LLE due to cancer treatment Severe COPD with FEV1 50% Depression Atrial fibrillation H/o \"two mini strokes\" S/p fall with hip facture · Right hip hemmiarthroplasty (5/23/18) by Dr. Jono Gage) · S/p removed hip hardwarare due to pain (4/15/19) COPD severe CKD, stage 3 Hyperkalemia Pulmonary hypertension Cardiac risk factors: · Morbid obesity, Body mass index is 44.39 kg/m². · DM2 insulin dependent · JED on Trilogy · HL Urinary incontinence, severe · no procedures due to anticoagulation · conservative management with Detrol 4 pills bid Endometrial cancer (2002) HTN 
HL 
  
CARDIAC IMAGING: 
Echo (9/24/19) LVEF 20-25%, AV opens 1:1, no AR Echo (18) LVEF 10-%, ramps study done, report in [facility] Echo (18) ramp study done, LVEDD 7.1cm [facility] (18) 2 vessel disease with 90% OM, 80% PDA, DSA to PDA branch 
  
HEMODYNAMICS: 
RHC not done CPEST not done 6MW not done 
  
OTHER IMAGING: 
EGD/C-scope (19) no active bleeding and polyp removed. 
  
FAMILY HISTORY: 
Mother  76 years, diagnosed with DM, HTN, CAD Father  [de-identified]years old, HTN, CAD, MI 
Positive for DM and heart disease in the family, brother with valve replacement 
  
SOCIAL HISTORY: 
Never alcohol, never smoked, , lives alone, no illicit drug use, lives in house, ambulatory status indpendedn, children: daughter, occupation retired Lives alone. 
  
ALLERGIES: benzodiazepines and quetiapine fumerate (lethargy, resp failure to both) 
  
HISTORY OF PRESENT ILLNESS: 
I had the pleasure of seeing Franklin Calix in [address] [place] [address] at [facility] in [address] [place] [address], Franklin Calix is a 78 y.o. female with multiple comorbidities listed above presents to our clinic requesting transfer of care from [facility] LVAD program. 
  
INTERVAL HISTORY: 
Today, Mrs. Germain presents for LVAD follow up accompanied by her daughter. She states she has had increasing dyspnea, nausea, and abdominal distention. She also notes some dysphagia. She is compliant with her medications, denies acute HF symptoms. Drive line site in intact at this time. She continues to have home health.   
 
 
REVIEW OF SYSTEMS: 
General: Denies fever, night sweats. Ear, nose and throat: +dysphagia. Denies difficulty hearing, sinus problems, runny nose, post-nasal drip, ringing in ears, mouth sores, loose teeth, ear pain, nosebleeds, sore throate, facial pain or numbess Cardiovascular: see above in the interval history Respiratory: +dyspnea. Denies cough, wheezing, sputum production, hemoptysis.  
Gastrointestinal: Denies heartburn, constipation, intolerance to certain foods, diarrhea, abdominal pain, nausea, vomiting, difficulty swallowing, blood in stool Kidney and bladder: Denies painful urination, frequent urination Musculoskeletal: Denies joint pain, muscle weakness Skin and hair: Denies change in hair loss or increase, breast changes improved Redness and edema, skin breakdown RLE, LLE with healing wound PHYSICAL EXAM: 
Visit Vitals BP (!) 100/0 (BP 1 Location: Right arm, BP Patient Position: Sitting, BP Cuff Size: Adult) Pulse 88 Temp 97.7 °F (36.5 °C) (Oral) Resp 18 Ht 5' 6\" (1.676 m) Wt 275 lb (124.7 kg) SpO2 96% BMI 44.39 kg/m² LVAD Pump Speed (RPM): 9400 Pump Flow (LPM): 5.6 MAP: 100 PI (Pulsitility Index): 5.8 Power: 6 Back Up  at Bedside & Labeled: Yes Testing Alarms Reviewed: Yes 
Back up SC speed: 9400 Back up Low Speed Limit: 9200 Emergency Equipment with Patient?: Yes Emergency procedures reviewed?: Yes Results for orders placed or performed in visit on 03/15/21 [place] INTERROGATION VAD IN [place] W/PHYS/QHP ANALYSIS Narrative Alarm history reviewed. Please see VAD flow sheet for readings. No PI events or adverse alarms noted. Settings reviewed, but no changes made. General: Patient is morbidly obese in no acute distress HEENT: Normocephalic and atraumatic. No scleral icterus. Pupils are equal, round and reactive to light and accomodation. No conjunctival injection. Oropharynx is clear. Neck: Supple. No evidence of thyroid enlargements or lymphadenopathy. JVD: Cannot be appreciated Lungs: Breath sounds are equal and clear bilaterally. No wheezes, rhonchi, or rales. Heart: VAD hum Abdomen: Soft, pannus, no mass or tenderness. No organomegaly or hernia. Bowel sounds present. Genitourinary and rectal: deferred Extremities: No cyanosis, clubbing, improved Redness and edema, skin breakdown RLE; BLE edema 2+, venous stasis, LLE wrapped with ACE wrap Neurologic: No focal sensory or motor deficits are noted. Grossly intact. Psychiatric: Awake, alert and oriented x 3. Appropriate mood and affect. Skin: Warm, dry and well perfused. No lesions, nodules or rashes are noted. PAST MEDICAL HISTORY: 
Past Medical History:  
Diagnosis Date  Asthma  [facility] ([facility] breast  
 [facility]   
 endometrial  
 Congestive heart failure, unspecified  CRI (chronic renal insufficiency)  Depression  Diabetes ([facility]  Hypertension PAST SURGICAL HISTORY: 
Past Surgical History:  
Procedure Laterality Date  HX HERNIA REPAIR    
 HX HYSTERECTOMY  HX MASTECTOMY FAMILY HISTORY: 
No family history on file. SOCIAL HISTORY: 
Social History Socioeconomic History  Marital status:  Spouse name: Not on file  Number of children: Not on file  Years of education: Not on file  Highest education level: Not on file Tobacco Use  Smoking status: Never Smoker  Smokeless tobacco: Never Used Substance and Sexual Activity  Alcohol use: Not Currently LABORATORY RESULTS: 
No flowsheet data found. ALLERGY: 
Allergies Allergen Reactions  Benzodiazepines Other (comments) Respiratory issues CURRENT MEDICATIONS: 
 
Current Outpatient Medications:  
  hydrALAZINE (APRESOLINE) 25 mg tablet, Take 4 Tabs by mouth three (3) times daily. , Disp: 1080 Tab, Rfl: 0 
  insulin detemir (LEVEMIR U-100 INSULIN SC), 10 Units by SubCUTAneous route two (2) times a day., Disp: , Rfl:  
  ranolazine ER (RANEXA) 500 mg SR tablet, TAKE 2 TABLETS BY MOUTH TWICE DAILY, Disp: 360 Tab, Rfl: 0 
  PARoxetine (PAXIL) 30 mg tablet, Take 20 mg by mouth daily. , Disp: , Rfl:  
  warfarin (COUMADIN) 5 mg tablet, Take 1 Tab by mouth daily. Take per INR tracker, Disp: 45 Tab, Rfl: 5 
  enoxaparin (Lovenox) 120 mg/0.8 mL injection, 120 mg by SubCUTAneous route every twelve (12) hours. , Disp: 12 Syringe, Rfl: 2 
  ferrous sulfate (Iron) 325 mg (65 mg iron) tablet, Take 1 Tab by mouth daily (with breakfast). , Disp: 30 Tab, Rfl: 1 
  albuterol (PROVENTIL HFA, VENTOLIN HFA, PROAIR HFA) 90 mcg/actuation inhaler, Take 2 Puffs by inhalation every four (4) hours as needed for Wheezing., Disp: 1 Inhaler, Rfl: 1 
  bumetanide (BUMEX) 1 mg tablet, Take 1 Tab by mouth daily. May take an additonal 1mg daily PRN for weight gain of 2-3lbs overnight or 5lbs in one week., Disp: 30 Tab, Rfl: 0 
  cephALEXin (Keflex) 500 mg capsule, Take 1 Cap by mouth two (2) times a day., Disp: 60 Cap, Rfl: 0 
  tafamidis (Vyndamax) 61 mg cap, Take 61 mg by mouth daily. , Disp: 30 Cap, Rfl: 2 
  magnesium oxide (MAG-OX) 400 mg tablet, Take 1 Tab by mouth daily. , Disp: 30 Tab, Rfl: 5 
  budesonide-formoteroL (Symbicort) 160-4.5 mcg/actuation HFAA, Take 2 Puffs by inhalation two (2) times daily as needed. , Disp: , Rfl:  
  hydrOXYzine HCL (ATARAX) 10 mg tablet, Take 10 mg by mouth three (3) times daily as needed for Itching (hives). , Disp: , Rfl:  
  warfarin (COUMADIN) 1 mg tablet, Take 4 mg by mouth daily. , Disp: , Rfl:  
 
Thank you for your referral and allowing me to participate in this patient's care. TEA Yusuf [address], [address] Phone: [phone] PATIENT CARE TEAM: 
Patient Care Team: 
Ruth Cunningham NP as PCP - General (Nurse Practitioner)

## 2021-03-15 NOTE — PROGRESS NOTES
Francia Griamldo NP  You 1 hour ago (12:32 PM) No changes, please ask HH to draw full set on Friday! [facility] Routing comment Patient notified of coumadin instructions at 3/15/21 clinic appointment. [address] and spoke with Sheryle Dus. Requested full set of labs be drawn Friday. She requested orders be faxed to [phone]. She verbalized understanding and had no further questions. Orders faxed this date. Kajal Peace RN.

## 2021-03-15 NOTE — PATIENT INSTRUCTIONS
Medication changes: STOP taking Bumex. BEGIN taking torsemide, 1/2 tablet daily. Please call our office if you gain weight, feel more short of breath, or feel dizzy. Testing Ordered: An order for an echocardiogram has been placed to be done. You will be receiving an automated call from Coordination of Care to schedule this test. If you are unavailable to receive the call or would like to contact coordination of care yourself you may contact [phone] to schedule. You will need to contact coordination of care yourself if you miss their calls as they will only make 3 attempts to reach you. Other Recommendations: A referral for Speech Therapy has been placed to evaluate your swallowing. Continue to change drive line exit site dressing 3 times a week using sterile technique, Ensure you are drinking an adequate amount of water with a goal of 6-8 eight ounce glasses (1.5-2 liters) of fluid daily. Your urine should be clear and light yellow straw colored. Follow up in 1 month with Beatriz Rosas. Please bring your daily sheets and medications to your next appointment. Please monitor your weights daily upon waking and after using the bathroom. Keep a written records of your weights and bring to your next appointment. If you have a weight gain of 3 or more pounds overnight OR 5 or more pounds in one week please contact our office. Thank you for allowing us the privilege of being a part of your healthcare team! Please do not hesitate to contact our office at [phone] with any questions or concerns.

## 2021-03-16 NOTE — PROGRESS NOTES
Cardiac Electrophysiology OFFICE Note Subjective:  
  
Romain Cantor is a 78 y.o. patient who is seen for follow up of [facility] dual chamber ICD (DOI 07/09/2010). NICM with biventricular failure, s/p LVAD in 2017 at [facility].  NYHA III chronic systolic CHF. On appropriate GDMT. 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 Recently with increasing HAIRSTON, but more subtle than previous SOB exacerbations. Planning PFTs with pulmonology in the near future. She does note early satiety, abdominal distention. Advanced heart failure team switching from Bumex torsemide. Sleeps in a recliner mainly due to hip pain, but believes she would experience orthopnea if she were to lay flat. Still on Keflex for prior chronic sternal wound, leg wound. Leg wound is improving, sternal wound now healed. Anticoagulated with warfarin, denies bleeding issues, but does have slow wound healing if she injures herself. Goal INR had been lowered to 1.8-2.5. Daughter present during appointment today. LV lead is turned down so LV pacing < 1% No more left phrenic nerve stimulation   
 
Previous: 
Pacing rate 70 bpm to suppress PVCs. Intolerant of spironolactone due to hyperkalemia. 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 Her daughter then reported that she was unsteady, fatigued, nauseated, & shaky with Levaquin & mexiletine. Antibiotic changed to cefepime IV. Stopped mexiletine 11/2020, started ranolazine 500 mg po bid on 11/11/2020. She did have subsequent run NSVT (8-15 beats, very wide complex, rate 150 bpm). Admitted 11/09/2020 after a fall, suffered leg wound bleeding.   She had been discharged on Levaquin, still treated for leg cellulitis (Morganella morganii), has renal failure, advanced systolic CHF, & anemia. Treated for abscess of sternal wound since late . 
  
S/p PCI approx 2017.  Reports history of Plavix resistance. 
  
Previously followed by [facility], now sees Dr. Ara Naik. 
  
Mother with CAD,  age 76.  Father  age [de-identified], CAD & MI. Aliene July with valve replacement. 
  
 
 
Problem List  Date Reviewed: 3/15/2021 Codes Class Noted HAIRSTON (dyspnea on exertion) ICD-10-CM: R06.00 
ICD-9-CM: 786.09  2021 Incontinence in female ICD-10-CM: R32 
ICD-9-CM: 625.6  2020 LVAD (left ventricular assist device) present [facility]) ICD-10-CM: N41.838 ICD-9-CM: V43.21  2020 Hospital discharge follow-up ICD-10-CM: [facility] ICD-9-CM: V67.59  2020 ICD (implantable cardioverter-defibrillator) battery depletion ICD-10-CM: Z45.02 
ICD-9-CM: V53.32  2020 Coagulopathy ([facility] ICD-10-CM: H38.1 ICD-9-CM: 286.9  11/10/2020 Open wound of left lower leg ICD-10-CM: K68.784F ICD-9-CM: 891.0  11/10/2020 Anemia ICD-10-CM: D64.9 ICD-9-CM: 285.9  2020 ROCIO (acute kidney injury) ([facility] ICD-10-CM: N17.9 ICD-9-CM: 584.9  2020 NSVT (nonsustained ventricular tachycardia) (HCC) ICD-10-CM: I47.2 ICD-9-CM: 427.1  2020 NICM (nonischemic cardiomyopathy) ([facility] ICD-10-CM: I42.8 ICD-9-CM: 425.4  2020 Coronary artery disease involving native coronary artery of native heart without angina pectoris ICD-10-CM: I25.10 ICD-9-CM: 414.01  2020 JDE on CPAP ICD-10-CM: G47.33, Z99.89 ICD-9-CM: 327.23, V46.8  2020 Chronic venous insufficiency ICD-10-CM: I87.2 ICD-9-CM: 459.81  2020 Ulcer of right foot, limited to breakdown of skin [facility] ICD-10-CM: Y46.831 ICD-9-CM: 707.15  2020 Acute on chronic systolic CHF (congestive heart failure) (HCC) ICD-10-CM: B00.02 ICD-9-CM: 428.23, 428.0  10/27/2020  Obesity, morbid ([facility] ICD-10-CM: E66.01 
ICD-9-CM: 278.01 10/1/2020 Current Outpatient Medications Medication Sig Dispense Refill  ranolazine ER (RANEXA) 500 mg SR tablet TAKE 2 TABLETS BY MOUTH TWICE DAILY 120 Tab 3  
 torsemide (DEMADEX) 100 mg tablet Take 0.5 Tabs by mouth daily. 30 Tab 2  
 hydrALAZINE (APRESOLINE) 25 mg tablet Take 4 Tabs by mouth three (3) times daily. 1080 Tab 0  
 insulin detemir (LEVEMIR U-100 INSULIN SC) 10 Units by SubCUTAneous route two (2) times a day.  PARoxetine (PAXIL) 30 mg tablet Take 20 mg by mouth daily.  warfarin (COUMADIN) 5 mg tablet Take 1 Tab by mouth daily. Take per INR tracker 45 Tab 5  
 enoxaparin (Lovenox) 120 mg/0.8 mL injection 120 mg by SubCUTAneous route every twelve (12) hours. 12 Syringe 2  
 ferrous sulfate (Iron) 325 mg (65 mg iron) tablet Take 1 Tab by mouth daily (with breakfast). 30 Tab 1  
 albuterol (PROVENTIL HFA, VENTOLIN HFA, PROAIR HFA) 90 mcg/actuation inhaler Take 2 Puffs by inhalation every four (4) hours as needed for Wheezing. 1 Inhaler 1  
 cephALEXin (Keflex) 500 mg capsule Take 1 Cap by mouth two (2) times a day. 60 Cap 0  
 tafamidis (Vyndamax) 61 mg cap Take 61 mg by mouth daily. 30 Cap 2  
 magnesium oxide (MAG-OX) 400 mg tablet Take 1 Tab by mouth daily. 30 Tab 5  
 budesonide-formoteroL (Symbicort) 160-4.5 mcg/actuation HFAA Take 2 Puffs by inhalation two (2) times daily as needed.  hydrOXYzine HCL (ATARAX) 10 mg tablet Take 10 mg by mouth three (3) times daily as needed for Itching (hives).  warfarin (COUMADIN) 1 mg tablet Take 4 mg by mouth daily. Allergies Allergen Reactions  Benzodiazepines Other (comments) Respiratory issues Past Medical History:  
Diagnosis Date  Asthma  Cancer ([facility] breast  
 [facility]   
 endometrial  
 Congestive heart failure, unspecified  CRI (chronic renal insufficiency)  Depression  Diabetes ([facility]  Hypertension Past Surgical History:  
Procedure Laterality Date  HX HERNIA REPAIR    
 HX HYSTERECTOMY  HX MASTECTOMY No family history on file. Social History Tobacco Use  Smoking status: Never Smoker  Smokeless tobacco: Never Used Substance Use Topics  Alcohol use: Not Currently Review of Systems: Review of all other systems otherwise negative. Constitutional: Negative for fever, chills, weight loss, malaise/fatigue. HEENT: Negative for nosebleeds, vision changes. Respiratory: Negative for cough, hemoptysis. Cardiovascular: Negative for chest pain, palpitations, + orthopnea, no claudication, + leg swelling, no syncope, and PND. + HAIRSTON Gastrointestinal: Negative for nausea, vomiting, diarrhea, blood in stool and melena. + early satiety Genitourinary: Negative for dysuria, and hematuria. Musculoskeletal: Negative for myalgias, + hiparthralgia. Skin: Negative for rash. BLE wounds. Slow wound healing. Heme: Does not bleed or bruise easily. Neurological: Negative for speech change and focal weakness. Objective:  
 
Visit Vitals BP (!) 88/0 (BP 1 Location: Right arm, BP Patient Position: Sitting) Pulse 81 Resp 16 Ht 5' 6\" (1.676 m) Wt 275 lb (124.7 kg) SpO2 96% BMI 44.39 kg/m² Physical Exam:  
Constitutional: Well-developed and well-nourished. No respiratory distress. Head: Normocephalic and atraumatic. Eyes: Pupils are equal, round. ENT: Hearing grossly normal. 
Neck: Supple. No JVD present. Cardiovascular: LVAD sounds. Pulmonary/Chest: Effort normal and breath sounds normal. No wheezes. Abdominal morbidly bese. Musculoskeletal: Moves extremities independently. Vasc/lymphatic: 4+ bilat lower extremity edema. Neurological: Alert,oriented. Skin: Skin is warm and dry. Bandages clean, dry, & intact. Right chest ICD site well healed. Psychiatric: Normal mood and affect.  Behavior is normal. Judgment and thought content normal.   
 
 
Assessment/Plan:  
 
Imaging/Studies: 
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 
  
Echo (11/02/2020): LVEF 15-20%, LVAD in place. Mildly dilated RV with mildly reduced RVEF. Mildly dilated LA. Aortic valve sclerosis without stenosis. [facility] (11/09/2018): 2 vessel disease with 90% OM, 80% PDA, DSA to PDA branch ICD-10-CM ICD-9-CM 1. Biventricular ICD (implantable cardioverter-defibrillator) in place  Z95.810 V45.02   
2. Acute on chronic systolic CHF (congestive heart failure) ([facility])  I50.23 428.23   
  428.0 3. LVAD (left ventricular assist device) present ([facility]  Z95.811 V43.21   
4. Chronic anticoagulation  Z79.01 V58.61   
5. NICM (nonischemic cardiomyopathy) ([facility])  I42.8 425.4 6. Cardiac amyloidosis ([facility])  E85.4 277.39 I43 425.7 7. HAIRSTON (dyspnea on exertion)  R06.00 786.09   
8. Abdominal distension  R14.0 787.3 [employer] biventricular ICD (DOI 07/09/2010): Device check today shows proper lead & generator function. Generator longevity estimated 1 yr, 5 mos. RA 17%, RV 93%, LV <1%. Since 05/29/2018, 9 VT episodes & several ATR episodes recorded on device, EGM appears to be PAT. Will order new Latitude box today. Low LV pacing < 1% consistent with previous; LV lead output had to be turned down due to persistent phrenic nerve stim. Risk of infection with possible lead replacement outweighs benefit to revision it at this time, but would reconsider when it comes time for generator change, 1.5 years from now. NICM: Strong evidence for aTTR cardiac amyloidosis. NYHA III chronic systolic CHF. LVAD since 2017, speed limited by VT. Intolerant of beta blocker, ACEi/ARB/ARNi due to aTTR. Advanced heart failure is holding for now. She has not started long on demadex VT: off label ranolazine as previously prescribed, was intolerant of mexiletine. Anticoagulation: On warfarin due to LVAD, has target range 1.8-2.5 per advanced heart failure. Remote ICD checks q 3 months. EP clinic follow up in 1 year. Follow up with Laura Vernon NP & advanced heart failure team as previously scheduled. Future Appointments Date Time Provider Mello Pacheco 4/15/2021 11:00 AM Joyce Gray NP [facility] BS AMB  
4/15/2021  1:00 PM Juan C Walter NP [facility] BS AMB  
6/21/2021  3:15 PM REMOTE1, DORIS NAGEL BS AMB  
9/27/2021  8:45 AM REMOTE1, DORIS NAGEL BS AMB  
1/31/2022  8:15 AM REMOTE1, DORIS NAGEL BS AMB  
5/3/2022  1:20 PM PACEMAKER3, DORIS NAGEL BS AMB  
5/3/2022  2:00 PM MD ROBE Tirado BS AMB Thank you for involving me in this patient's care and please call with further concerns or questions. Haleigh Anna M.D. Electrophysiology/Cardiology [facility] [address], [facility] [address] [facility], [address] [place] [address] 
[phone]                                        [phone]

## 2021-03-30 NOTE — TELEPHONE ENCOUNTER
Telephone Call RE:  Lab Reminder      Outcome:     [x] Patient verbalizes understanding    [] Unable to reach   [] Left message              []     Requesting patient check her INR in the AM      [facility]

## 2021-04-01 NOTE — TELEPHONE ENCOUNTER
Ms. Elisa Moore,    Just following up to make sure you received your new Latitude home monitoring box for your ICD. If so can you please plug it in? Thank you!   [facility]

## 2021-04-01 NOTE — TELEPHONE ENCOUNTER
Patient's daughter called and stated patient has been dizzy, nauseated and losing 1 lb a day. Mitch Bosch states her maps are in the 63's. Spoke to Bernardo Zuluaga NP who recommended holding Torsemide, having HH draw a full set of labs, and to call over the weekend if she has SOB or weight gain. Melinda verbalized understanding. Called Candace at [facility] and requested a full set of labs.

## 2021-04-02 NOTE — TELEPHONE ENCOUNTER
Called patient to review recent labs, notable for Cr 2.89. Pt recently had torsemide d/c'd d/t rapid weight loss (>1 lb daily). Will cont to hold diuretics, increase PO fluid intake and reassess labs on Monday. Pt aware.

## 2021-04-05 NOTE — TELEPHONE ENCOUNTER
Contacted patient who requested orders be sent to [facility] in [place]. Orders faxed. Patient verbalized understanding and had no further questions. Shirley Epperson RN.

## 2021-04-06 NOTE — TELEPHONE ENCOUNTER
Contacted patient who reported she feels \"much better,\" today. She denied any swelling, shortness of breath, or dizziness. She confirmed she restarted torsemide 50mg daily yesterday. Weight yesterday was 269lb. She stated she did not obtain wt today. Reviewed with TIFFANY Walter NP who recommended patient continue current plan of care and call if she develops any dizziness or weight loss, or if she has weight gain, increased swelling, or shortness of breath. Pt notified. She verbalized understanding and had no further questions. Tyler De La Torre RN.

## 2021-04-06 NOTE — TELEPHONE ENCOUNTER
Labs reviewed; notable for improved Cr (2.44 from 2.89) and proBNP (7720 from 8957). Will inquire re: weight and symptoms to guide further diuretic dosing.

## 2021-04-07 NOTE — TELEPHONE ENCOUNTER
Telephone Call RE:  Lab Reminder      Outcome:     [x] Patient verbalizes understanding    [] Unable to reach   [] Left message              []     Requesting patient check her INR in the AM      [facility]

## 2021-04-07 NOTE — TELEPHONE ENCOUNTER
Spoke with patient to inquire if she received patient permission form from [facility]. She stated she has and she completed form. She confirmed she will mail today. She had no further questions. Charlette Steward from [facility] updated. Shannan Alanis RN.

## 2021-04-15 NOTE — TELEPHONE ENCOUNTER
Called patient to verify appointment with NP Gely Hawk this morning since she also has appointment at HF clinic at 1:00. Verified patient's identity with two identifiers. Patient stated she planned both appointments for same day since her daughter lives far. She will arrive early for appointment today. Patient verbalized understanding and denied further questions or concerns.

## 2021-04-15 NOTE — PATIENT INSTRUCTIONS
Medication changes: 
 
INCREASE your torsemide to 100 mg (1 tablet) daily. Please take this to your pharmacy to notify them of the change in medications. Testing Ordered: No testing today. Other Recommendations:  
  
Ensure your drinking an adequate amount of water with a goal of 6-8 eight ounce glasses (1.5-2 liters) of fluid daily. Your urine should be clear and light yellow straw colored. If your blood pressure begins to consistently run below 90/60 and/or you begin to experience dizziness or lightheadedness, please contact the [place] [address] at [phone]. A member of our team will contact you regarding admission on Friday, 4/16. Please monitor your weights daily upon waking and after using the bathroom. Keep a written records of your weights and bring to your next appointment. If you have a weight gain of 3 or more pounds overnight OR 5 or more pounds in one week please contact our office. Thank you for allowing us the privilege of being a part of your healthcare team! Please do not hesitate to contact our office at [phone] with any questions or concerns. Virtual Heart Failure Support Group [employer] invites you to learn more about heart failure and to share your questions, ideas, and experiences with others. Each month, the Heart Failure Support Group features a new educational topic and a guest speaker, followed by an interactive discussion. Our Heart Failure Nurse Navigator will moderate each session. You will be able to participate by phone, tablet or computer through [address] [place] [address]. This support group takes place on the 3rd Thursday of each month from 6:00-7:30PM. All individuals living with heart failure and their caregivers are welcome to join. If you are interested in participating, please contact us at [email or web address] and you will be sent the link to join the [employer].

## 2021-04-15 NOTE — PROGRESS NOTES
[facility] Patient [place] and spoke with NUZHAT. [facility] does not have a bed available today and was advised to go to ED. Arranged for a bed on [facility] tomorrow (4/16/21) morning. Rod Escobar NP made aware.

## 2021-04-15 NOTE — PROGRESS NOTES
[facility] Patient name: Lelia Reynoso Patient : 1950 Patient MRN: [id number] Date of service: 04/15/21 Primary F cardiologist: Stacey Collado MD 
  
CHIEF COMPLAINT: 
Chief Complaint Patient presents with  Peripheral Edema  Shortness of Breath  
 
  PLAN: 
Continue current device speed 9400rpm; intolerant to higher speed due to VT Expect increasing intolerance to BB/ACEi/ARB/ARNI due to aTTR; on hold for now Continue tafamidis 61 mg PO daily; PA completed Hydralazine 100 mg po TID, MAPs today 100 mmHg (intermittently palpable radial pulse) Intolerant of spironolactone due to hyperkalemia  
Transition from Bumex to torsemide 50 mg PO daily d/t abdominal distention Chronic anticoagulation on Coumadin; goal INR lowered to 1.8-2.5 Continue ranolazine, no ectopy - followed by Dr. Georgiana Velazquez Antibiotics per ID- continue Keflex po 
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 Eder Sun - has not been able to see her yet due to multiple provider initiated cancellations Follow up with pulmonology Dr. Shanae Morgan, COPD, trilogy management Follow up with Galindo Chisholm NP Follow up with ID, Dr. Olinda Barajas; chronic Keflex suppression Follow up with [facility] in 1 month 
TTE in April 2021 
 
-echo here 
-lymphapress 
-cath IMPRESSION: 
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 Dio Pelaez at [facility]) · C/b delayed extubation due to severe COPD 
· C/b critical illness polyneuropathy · C/b prolonged hospitalization post-LVAD, 55 days · C/b sternal wound infection s/p debridement (by Dr. Dio Pelaez) s/p wound vac · C/b sacral ulcer · Would culture positive for Staph aureus, not MRSA · C/b LVAD site drainage, improved S/p CRT-ICD · ICD fired due to electrolyte imbalance (2011) H/o breast cancer (1992) · s/p bilateral mastectomy/chemo and endometrial cancer s/p hysterectomy · Lymphedema of LLE due to cancer treatment Severe COPD with FEV1 50% Depression Atrial fibrillation H/o \"two mini strokes\" S/p fall with hip facture · Right hip hemmiarthroplasty (5/23/18) by Dr. Ryan Venegas) · S/p removed hip hardwarare due to pain (4/15/19) COPD severe CKD, stage 3 Hyperkalemia Pulmonary hypertension Cardiac risk factors: · Morbid obesity, Body mass index is 43.74 kg/m². · DM2 insulin dependent · JED on Trilogy · HL Urinary incontinence, severe · no procedures due to anticoagulation · conservative management with Detrol 4 pills bid Endometrial cancer (2002) HTN 
HL 
  
CARDIAC IMAGING: 
Echo (9/24/19) LVEF 20-25%, AV opens 1:1, no AR Echo (5/29/18) LVEF 10-%, ramps study done, report in epic Echo (18) ramp study done, LVEDD 7.1cm [facility] (18) 2 vessel disease with 90% OM, 80% PDA, DSA to PDA branch 
  
HEMODYNAMICS: 
RHC not done CPEST not done 6MW not done 
  
OTHER IMAGING: 
EGD/C-scope (19) no active bleeding and polyp removed. 
  
FAMILY HISTORY: 
Mother  76 years, diagnosed with DM, HTN, CAD Father  [de-identified]years old, HTN, CAD, MI 
Positive for DM and heart disease in the family, brother with valve replacement 
  
SOCIAL HISTORY: 
Never alcohol, never smoked, , lives alone, no illicit drug use, lives in house, ambulatory status indpendedn, children: daughter, occupation retired Lives alone. 
  
ALLERGIES: benzodiazepines and quetiapine fumerate (lethargy, resp failure to both) 
  
HISTORY OF PRESENT ILLNESS: 
I had the pleasure of seeing Judith Mart in [facility] at [facility] in [address] [place] [address] Judith Phoenix is a 78 y.o. female with multiple comorbidities listed above presents to our clinic requesting transfer of care from [facility] LVAD program. 
  
INTERVAL HISTORY: 
 
Chest tightness when she gets off trilogy machine Seen pulmonary who blames it on the heart Just changed from bumex to torsemide since last week Losing a pound a day and then got lightheaded Down 6-8 lbs No endurance to do PT Can only walk a few steps with rollator Has a lot of dyspnea with exertion Sleeps in a recliner Lots of swelling No compression stockings - told not never wear compression stockings Discussed Labs every week with Adv CHF Today, Mrs. Germain presents for LVAD follow up accompanied by her daughter. She states she has had increasing dyspnea, nausea, and abdominal distention. She also notes some dysphagia. She is compliant with her medications, denies acute HF symptoms. Drive line site in intact at this time.  She continues to have home health.   
 
 
REVIEW OF SYSTEMS: 
General: Denies fever, night sweats. Ear, nose and throat: +dysphagia. Denies difficulty hearing, sinus problems, runny nose, post-nasal drip, ringing in ears, mouth sores, loose teeth, ear pain, nosebleeds, sore throate, facial pain or numbess Cardiovascular: see above in the interval history Respiratory: +dyspnea. Denies cough, wheezing, sputum production, hemoptysis. Gastrointestinal: Denies heartburn, constipation, intolerance to certain foods, diarrhea, abdominal pain, nausea, vomiting, difficulty swallowing, blood in stool Kidney and bladder: Denies painful urination, frequent urination Musculoskeletal: Denies joint pain, muscle weakness Skin and hair: Denies change in hair loss or increase, breast changes improved Redness and edema, skin breakdown RLE, LLE with healing wound PHYSICAL EXAM: 
Visit Vitals Pulse 72 Wt 271 lb (122.9 kg) SpO2 94% BMI 43.74 kg/m² No results found for this visit on 04/15/21. General: Patient is morbidly obese in no acute distress HEENT: Normocephalic and atraumatic. No scleral icterus. Pupils are equal, round and reactive to light and accomodation. No conjunctival injection. Oropharynx is clear. Neck: Supple. No evidence of thyroid enlargements or lymphadenopathy. JVD: Cannot be appreciated Lungs: Breath sounds are equal and clear bilaterally. No wheezes, rhonchi, or rales. Heart: VAD hum Abdomen: Soft, pannus, no mass or tenderness. No organomegaly or hernia. Bowel sounds present. Genitourinary and rectal: deferred Extremities: No cyanosis, clubbing, improved Redness and edema, skin breakdown RLE; BLE edema 2+, venous stasis, LLE wrapped with ACE wrap Neurologic: No focal sensory or motor deficits are noted. Grossly intact. Psychiatric: Awake, alert and oriented x 3. Appropriate mood and affect. Skin: Warm, dry and well perfused. No lesions, nodules or rashes are noted. PAST MEDICAL HISTORY: 
Past Medical History:  
Diagnosis Date  Asthma  Cancer ([facility] breast  
 Cancer ([facility]   
 endometrial  
 Congestive heart failure, unspecified  CRI (chronic renal insufficiency)  Depression  Diabetes ([facility]  Hypertension PAST SURGICAL HISTORY: 
Past Surgical History:  
Procedure Laterality Date  HX HERNIA REPAIR    
 HX HYSTERECTOMY  HX MASTECTOMY FAMILY HISTORY: 
No family history on file. SOCIAL HISTORY: 
Social History Socioeconomic History  Marital status:  Spouse name: Not on file  Number of children: Not on file  Years of education: Not on file  Highest education level: Not on file Tobacco Use  Smoking status: Never Smoker  Smokeless tobacco: Never Used Substance and Sexual Activity  Alcohol use: Not Currently LABORATORY RESULTS: 
Labs Latest Ref Rng & Units 4/5/2021 4/2/2021 WBC 3.4 - 10.8 x10E3/uL - 4.9  
RBC 3.77 - 5.28 x10E6/uL - 3.45(L) Hemoglobin 11.1 - 15.9 g/dL - 9. 0(L) Hematocrit 34.0 - 46.6 % - 28. 7(L) MCV 79 - 97 fL - 83 Platelets 723 - 669 T31T1/MI - 181 Albumin 3.8 - 4.8 g/dL 4.0 4.2 Calcium 8.7 - 10.3 mg/dL 8.9 9.5 Glucose 65 - 99 mg/dL 179(H) 155(H) BUN 8 - 27 mg/dL 35(H) 43(H) Creatinine 0.57 - 1.00 mg/dL 2.44(H) 2.89(H) Sodium 134 - 144 mmol/L 140 140 Potassium 3.5 - 5.2 mmol/L 4.9 4.9  - 226 IU/L - 208 Some recent data might be hidden ALLERGY: 
Allergies Allergen Reactions  Benzodiazepines Other (comments) Respiratory issues CURRENT MEDICATIONS: 
 
Current Outpatient Medications:  
  warfarin (COUMADIN) 1 mg tablet, Take 4 Tabs by mouth daily. , Disp: 180 Tab, Rfl: 5 
  FeroSul 325 mg (65 mg iron) tablet, TAKE 1 TABLET BY MOUTH EVERY MORNING WITH BREAKFAST, Disp: 30 Tab, Rfl: 1   ranolazine ER (RANEXA) 500 mg SR tablet, TAKE 2 TABLETS BY MOUTH TWICE DAILY, Disp: 120 Tab, Rfl: 3 
  torsemide (DEMADEX) 100 mg tablet, Take 0.5 Tabs by mouth daily. , Disp: 30 Tab, Rfl: 2 
  hydrALAZINE (APRESOLINE) 25 mg tablet, Take 4 Tabs by mouth three (3) times daily. , Disp: 1080 Tab, Rfl: 0 
  insulin detemir (LEVEMIR U-100 INSULIN SC), 10 Units by SubCUTAneous route two (2) times a day., Disp: , Rfl:  
  PARoxetine (PAXIL) 30 mg tablet, Take 20 mg by mouth daily. , Disp: , Rfl:  
  warfarin (COUMADIN) 5 mg tablet, Take 1 Tab by mouth daily. Take per INR tracker, Disp: 45 Tab, Rfl: 5 
  enoxaparin (Lovenox) 120 mg/0.8 mL injection, 120 mg by SubCUTAneous route every twelve (12) hours. , Disp: 12 Syringe, Rfl: 2 
  albuterol (PROVENTIL HFA, VENTOLIN HFA, PROAIR HFA) 90 mcg/actuation inhaler, Take 2 Puffs by inhalation every four (4) hours as needed for Wheezing., Disp: 1 Inhaler, Rfl: 1   cephALEXin (Keflex) 500 mg capsule, Take 1 Cap by mouth two (2) times a day., Disp: 60 Cap, Rfl: 0 
  tafamidis (Vyndamax) 61 mg cap, Take 61 mg by mouth daily. , Disp: 30 Cap, Rfl: 2 
  magnesium oxide (MAG-OX) 400 mg tablet, Take 1 Tab by mouth daily. , Disp: 30 Tab, Rfl: 5 
  budesonide-formoteroL (Symbicort) 160-4.5 mcg/actuation HFAA, Take 2 Puffs by inhalation two (2) times daily as needed. , Disp: , Rfl:  
  hydrOXYzine HCL (ATARAX) 10 mg tablet, Take 10 mg by mouth three (3) times daily as needed for Itching (hives). , Disp: , Rfl:  
 
 
TEA Donaldson [id number], [address] Phone: [phone]

## 2021-04-16 PROBLEM — R06.00 DYSPNEA: Status: ACTIVE | Noted: 2021-01-01

## 2021-04-16 NOTE — PROGRESS NOTES
0955hrs:  Patient arrived via wheelchair with daughterZuleima. Vitals stable. MAP 98--per daughter this is typically where she runs. Patient is dyspneic on exertion, Paced 70's, SpO2 96% on room air. 1009hrs:  NP notified of patient's arrival and need for orders via perfect serve. Per daughter, patient took all of her morning medications except Torsemide today. 1015hrs:  RNs attempting IV access and lab draw. 1040hrs:  IV access obtained, labs sent. 1450hrs:  Per RT, unable to obtain ABG.

## 2021-04-16 NOTE — PROGRESS NOTES
[facility] VAD/Heart Failure Progress Note Admit Date: 2021 POD:  * No surgery found * Procedure:      
 
 Subjective:  
Admitted for dyspnea and fluid overload; good flows Objective:  
Vitals: 
Blood pressure (!) 80/0, pulse 78, temperature 97.9 °F (36.6 °C), resp. rate 22, height 5' 7\" (1.702 m), weight 270 lb (122.5 kg), SpO2 95 %. Temp (24hrs), Av.6 °F (36.4 °C), Min:97.5 °F (36.4 °C), Max:97.9 °F (36.6 °C) Hemodynamics: 
 CO:   
 CI:   
 CVP:   
 SVR:   
 PAP Systolic:   
 PAP Diastolic:   
 PVR:   
 GK42:   
 SCV02:   
 
VAD Interrogation: LVAD (Heartmate) Pump Speed (RPM): 9400 Pump Flow (LPM): 5.2 PI (Pulsitility Index): 5.4 Power: 5.8  Test: Yes 
Back Up  at Bedside & Labeled: Yes Power Module Test: No 
 
EKG/Rhythm:   
 
Extubation Date / Time:  
 
CT Output:  
 
Ventilator: 
  
 
Oxygen Therapy: 
Oxygen Therapy O2 Sat (%): 95 % (21 1537) O2 Device: None (Room air) (21 0955) CXR: 
 
Admission Weight: Last Weight Weight: 270 lb 6.4 oz (122.7 kg) Weight: 270 lb (122.5 kg) Intake / Output / Drain: 
Current Shift: No intake/output data recorded. Last 24 hrs.: No intake or output data in the 24 hours ending 21 1384 No results for input(s): CPK, CKMB, TROIQ in the last 72 hours. No results for input(s): NA, K, CO2, BUN, CREA, GLU, PHOS, MG, WBC, HGB, HCT, PLT, HGBEXT, HCTEXT, PLTEXT in the last 72 hours. No lab exists for component:  ALB Recent Labs 21 
1040 INR 1.6* PTP 16.5* No lab exists for component: PBNP Current Facility-Administered Medications:  
  sodium chloride (NS) flush 5-40 mL, 5-40 mL, IntraVENous, Q8H, Polliard, Imani Helms, NP 
  sodium chloride (NS) flush 5-40 mL, 5-40 mL, IntraVENous, PRN, Imani Walter NP 
  acetaminophen (TYLENOL) tablet 650 mg, 650 mg, Oral, Q4H PRN, Imani Walter, NP 
  hydrALAZINE (APRESOLINE) 20 mg/mL injection 10 mg, 10 mg, IntraVENous, Q4H PRN, Sandeep Walter NP 
  albuterol (PROVENTIL VENTOLIN) nebulizer solution 2.5 mg, 2.5 mg, Nebulization, Q4H PRN, Sandeep Walter NP 
  arformoterol 15 mcg/budesonide 0.5 mg neb solution, , Nebulization, BID RT, Sandeep Walter NP 
  cephALEXin (KEFLEX) capsule 500 mg, 500 mg, Oral, BID, Sandeep Walter NP 
  hydrALAZINE (APRESOLINE) tablet 100 mg, 100 mg, Oral, TID, Sandeep Walter NP 
  hydrOXYzine HCL (ATARAX) tablet 10 mg, 10 mg, Oral, TID PRN, Sandeep Walter NP 
  insulin glargine (LANTUS) injection 20 Units, 20 Units, SubCUTAneous, BID, Sandeep Walter NP 
  [START ON 4/17/2021] PARoxetine (PAXIL) tablet 20 mg, 20 mg, Oral, DAILY, Sandeep Walter NP 
  ranolazine ER (RANEXA) tablet 1,000 mg, 1,000 mg, Oral, BID, Sandeep Walter NP 
  [START ON 4/17/2021] tafamidis cap 61 mg, 61 mg, Oral, DAILY, Sandeep Walter NP 
  bumetanide (BUMEX) injection 1 mg, 1 mg, IntraVENous, BID, Sandeep Walter NP 
  warfarin dosing per pharmacy, , Other, Rx Dosing/Monitoring, Sandeep Walter NP 
  miconazole (MICOTIN) 2 % powder, , Topical, BID, Whitney Baig MD 
  warfarin (COUMADIN) tablet 4 mg, 4 mg, Oral, ONCE, Sandeep Walter NP 
 
A/P 
 
S/P LVAD - good flows Need for [facility] - coumadin Fluid overload - diuresis Risk of morbidity and mortality - high Medical decision making - high complexity Signed By: Jennifer Sol MD

## 2021-04-16 NOTE — PROGRESS NOTES
SPEECH PATHOLOGY BEDSIDE SWALLOW EVALUATION/DISCHARGE Patient: Marianne Roy (31 y.o. female) Date: 4/16/2021 Primary Diagnosis: heart failure Precautions: n/a ASSESSMENT : 
Swallow evaluation complete. No overt difficulty nor overt s/s of aspiration appreciated on this date. However, pt reports difficulty with solids getting stuck at the level of the thyroid consistent with globus sensation. Given this, hx of cardiac surgery, constipation, and cardiomegaly, would favor esophageal dysphagia over oropharyngeal as pt without any oropharyngeal deficits appreciated. Reviewed esophageal/reflux precautions. Could consider further GI work-up. Otherwise no SLP needs. Skilled acute therapy provided by a speech-language pathologist is not indicated at this time. PLAN : 
Recommendations: 
--regular diet/thin liquids --extra sauces and gravies --small sips/bites 
--alternate liquids/solids 
--fully upright  
--consider medical management of reflux 
--consider GI work-up Discharge Recommendations: None SUBJECTIVE:  
Patient stated, \"I'm over it. I'm ready to have things fixed. OBJECTIVE:  
 
Past Medical History:  
Diagnosis Date  Asthma  Cancer ([facility] breast  
 Cancer ([facility]   
 endometrial  
 Congestive heart failure, unspecified  CRI (chronic renal insufficiency)  Depression  Diabetes ([facility]  Hypertension Past Surgical History:  
Procedure Laterality Date  HX HERNIA REPAIR    
 HX HYSTERECTOMY  HX MASTECTOMY Diet prior to admission: regular diet/thin liquids Current Diet:  Regular diet/thin liquids Cognitive and Communication Status: 
Neurologic State: Alert Orientation Level: Oriented X4 Cognition: Appropriate decision making, Appropriate for age attention/concentration, Appropriate safety awareness Perception: Appears intact Perseveration: No perseveration noted Safety/Judgement: Awareness of environment Oral Assessment: 
Oral Assessment Labial: No impairment Dentition: Natural 
Oral Hygiene: oral mucosa moist and clear of secretions Lingual: No impairment Velum: No impairment Mandible: No impairment P.O. Trials: 
Patient Position: upright in bed Vocal quality prior to P.O.: No impairment Consistency Presented: Thin liquid; Solid How Presented: Self-fed/presented;Cup/sip;Spoon;Straw Bolus Acceptance: No impairment Bolus Formation/Control: No impairment Propulsion: No impairment Oral Residue: None Initiation of Swallow: No impairment Laryngeal Elevation: Functional 
Aspiration Signs/Symptoms: None Pharyngeal Phase Characteristics: No impairment, issues, or problems Oral Phase Severity: No impairment Pharyngeal Phase Severity : No impairment NOMS:  
The NOMS functional outcome measure was used to quantify this patient's level of swallowing impairment. Based on the NOMS, the patient was determined to be at level 7 for swallow function NOMS Swallowing Levels: 
Level 1 (CN): NPO Level 2 (CM): NPO but takes consistency in therapy Level 3 (CL): Takes less than 50% of nutrition p.o. and continues with nonoral feedings; and/or safe with mod cues; and/or max diet restriction Level 4 (CK): Safe swallow but needs mod cues; and/or mod diet restriction; and/or still requires some nonoral feeding/supplements Level 5 (CJ): Safe swallow with min diet restriction; and/or needs min cues Level 6 (CI): Independent with p.o.; rare cues; usually self cues; may need to avoid some foods or needs extra time Level 7 (CH): Independent for all p.o. NANCY (2003). National Outcomes Measurement System (NOMS): Adult Speech-Language Pathology User's Guide. Pain: 
Pain Scale 1: Numeric (0 - 10) Pain Intensity 1: 0 After treatment:  
Call bell within reach and Nursing notified COMMUNICATION/EDUCATION:  
 
The patient's plan of care including recommendations, planned interventions, and recommended diet changes were discussed with: Registered nurse and NP. Thank you for this referral. 
Tristan Roldan, SLP Time Calculation: 20 mins

## 2021-04-16 NOTE — PROGRESS NOTES
SLP Contact Note SLP evaluation complete. Pt reports difficulty with solids getting stuck at the level of the thyroid consistent with globus sensation. Given this, hx of cardiac surgery, constipation, and cardiomegaly, would favor esophageal dysphagia over oropharyngeal as pt without any oropharyngeal deficits appreciated. Reviewed esophageal/reflux precautions. Could consider further GI work-up. Otherwise no SLP needs. Full note to follow. Thank you, Maricarmen Mane M.Ed, CCC-SLP Speech-Language Pathologist

## 2021-04-16 NOTE — H&P
[address] [facility]  History and Physical    Primary Care Provider: Scottie Dutton NP  Date of Service:  4/16/2021    Subjective:     Earlene Perez is a 78 y.o. female with a past medical history of heart failure s/p LVAD, COPD, CKD stage 3, pulmonary HTN, Depression, HTN, and HLD who presented to [facility] appointment with complaints of chest tightness and increasing dyspnea. She was instructed to come to [facility] for admission for further treatment. At time of examination patient experiencing dyspnea but states she's feeling 100% better than what she felt yesterday. Hospitalist was consulted for admission. Review of Systems:    A comprehensive review of systems was negative except for that written in the History of Present Illness. Past Medical History:   Diagnosis Date    Asthma     Cancer ([facility] [address]     breast    Cancer ([facility] [address]     endometrial    Congestive heart failure, unspecified     CRI (chronic renal insufficiency)     Depression     Diabetes (HCC)     Hypertension       Past Surgical History:   Procedure Laterality Date    HX HERNIA REPAIR      HX HYSTERECTOMY      HX MASTECTOMY       Prior to Admission medications    Medication Sig Start Date End Date Taking? Authorizing Provider   warfarin (COUMADIN) 1 mg tablet Take 4 Tabs by mouth daily. 4/12/21   Stacy Chiang NP   FeroSul 325 mg (65 mg iron) tablet TAKE 1 TABLET BY MOUTH EVERY MORNING WITH BREAKFAST 4/6/21   Abelardo Quintanilla MD   ranolazine ER (RANEXA) 500 mg SR tablet TAKE 2 TABLETS BY MOUTH TWICE DAILY 3/16/21   Abelardo Quintanilla MD   torsemide [facility]) 100 mg tablet Take 0.5 Tabs by mouth daily. 3/15/21   Charan Walter NP   hydrALAZINE (APRESOLINE) 25 mg tablet Take 4 Tabs by mouth three (3) times daily. 2/25/21   Oleksandr Limon NP   insulin detemir (LEVEMIR U-100 INSULIN SC) 20 Units by SubCUTAneous route two (2) times a day.     Provider, Historical   PARoxetine (PAXIL) 30 mg tablet Take 20 mg by mouth daily. Provider, Historical   warfarin (COUMADIN) 5 mg tablet Take 1 Tab by mouth daily. Take per INR tracker 1/7/21   Mohan Linda NP   enoxaparin (Lovenox) 120 mg/0.8 mL injection 120 mg by SubCUTAneous route every twelve (12) hours. 1/4/21   Polliard, Romana Blazer, NP   albuterol (PROVENTIL HFA, VENTOLIN HFA, PROAIR HFA) 90 mcg/actuation inhaler Take 2 Puffs by inhalation every four (4) hours as needed for Wheezing. 12/27/20   Kim Parnell MD   cephALEXin (Keflex) 500 mg capsule Take 1 Cap by mouth two (2) times a day. 12/10/20   Mohan Linda NP   tafamidis (Vyndamax) 61 mg cap Take 61 mg by mouth daily. 12/9/20   Mohan Linda NP   magnesium oxide (MAG-OX) 400 mg tablet Take 1 Tab by mouth daily. 12/8/20   Mohan Linda NP   budesonide-formoteroL (Symbicort) 160-4.5 mcg/actuation HFAA Take 2 Puffs by inhalation two (2) times daily as needed. Provider, Historical   hydrOXYzine HCL (ATARAX) 10 mg tablet Take 10 mg by mouth three (3) times daily as needed for Itching (hives). Provider, Historical     Allergies   Allergen Reactions    Benzodiazepines Other (comments)     Respiratory issues      No family history on file. SOCIAL HISTORY:  Patient resides at Home. Patient ambulates with [facility] . Smoking history: Denies   Alcohol history: Denies           Objective:       Physical Exam:   Visit Vitals  BP (!) 80/0   Pulse 78   Temp 97.9 °F (36.6 °C)   Resp 22   Ht 5' 7\" (1.702 m)   Wt 122.5 kg (270 lb)   SpO2 95%   BMI 42.29 kg/m²     General appearance: alert, cooperative, no distress, appears stated age  Lungs: Dyspnea, lungs diminished   Heart: LVAD intact   Abdomen: soft, non-tender. Bowel sounds normal. No masses,  no organomegaly  Extremities: edema BLE   Pulses: 2+ and symmetric  Skin: BLE redness and edema.  Skin breakdown RLE, LLE with healing wounds  Neurologic: Grossly normal  Cap refill: Brisk, less than 3 seconds  Pulses: 2+, symmetric in all extremities         Data Review: All diagnostic labs and studies have been reviewed. Chest x-ray No radiographic evidence of acute cardiopulmonary disease. .    Assessment:     Active Problems:    Dyspnea (4/16/2021)        Plan:   Dyspnea   Chronic systolic congestive heart failure   - NYHA class IV   - s/p LVAD heartMate 2   - Advanced heart failure team following   - Continue Bumex  - Cardiac diet     COPD  - PRN nebs     DM   - A1c pending   - Monitor BG AC/HS   - Continue lantus 20 units     BLE edema with ulcers   - Elevate legs   - Wound care following   - Continue antibiotics     JED   - Continue home trilogy    Morbid Obesity   - BMI 42.29  - Weight loss management to be followed up outpatient      FUNCTIONAL STATUS PRIOR TO HOSPITALIZATION (including history of recent falls):Rollator    Signed By: Byron Joseph NP     April 16, 2021

## 2021-04-16 NOTE — PROGRESS NOTES
Pharmacist Note  Warfarin Dosing Consult provided for this 78 y. o.female to manage warfarin for LVAD INR Goal: 1.8-2.5 per NP Home regimen/ tablet size: 4mg daily per most recent fill on 4/12/21 Patient takes in evening. Verified with RN that she has not taken today PTA Drugs that may increase INR: None Drugs that may decrease INR: None Other current anticoagulants/ drugs that may increase bleeding risk: None Risk factors: Age > 72 Daily INR ordered: YES Recent Labs 04/16/21 
1040 INR 1.6* Date               INR                  Dose 4/16/21 1.6  4mg Assessment/ Plan: Will order warfarin 4 mg PO x 1 dose. Pharmacy will continue to monitor daily and adjust therapy as indicated. Please contact the pharmacist at  for outpatient recommendations if needed.

## 2021-04-16 NOTE — WOUND CARE
WOCN Note:  
 
New consult placed by RN for multiple wounds present on admission Chart shows: 
Patient admitted on 4/16/21, was direct admit from physicians office r/t heart failure Past Medical History:  
Diagnosis Date  Asthma  Cancer ([facility] [address] breast  
 Cancer ([facility] [address]   
 endometrial  
 Congestive heart failure, unspecified  CRI (chronic renal insufficiency)  Depression  Diabetes ([facility] [address]  Hypertension Admitted from home Assessment:  
A&O x 4, Appropriately conversational 
Reports no pain in 
Mobility: Moderate assistance with repositioning. Pateint needs to have HOB elevated high due to SOB and oxygen saturation Continence: Incontinent of urine and stool Last Jaime Score: not documented Surface: Versacare p500 mattress Diet: Cardiac regular Bilateral heel and sacral skin intact and without erythema Heels offloaded with pillows Patient has been receiving home care through [facility] for wound care 1. POA Right anterior lower leg wound Etiology: Venous Full thickness 0.2 x 0.2 x 0.2 cm Base is 100% pink  
serosang exudate, scant amount 
no odor  
attached edges Periwound intact with dry skin Image from 4/16/21 2. POA Right medial lower leg wound Etiology: Trauma, Pt hit leg on  door Full thickness 1 x 1 x 0.1 cm Base is 25% slough, 75% red  
serosang exudate, small amount 
no odor  
attached edges Periwound intact and skin dry Image from 4/16/21 3. POA Left anterior lower leg wound Etiology: Venous Full thickness 1.4 x 0.9 x 0.2 cm Base is 100% red  
serosang exudate, small amount 
no odor  
attached edges Periwound intact, skin dry Image from 4/16/21 4. POA Stage 3 pressure injury to right buttock Etiology: Pressure 3 x 1.5 x 0.1 cm Base is 100% red  
serosang exudate, small amount 
no odor  
attached edges Periwound intact, skin dry Image from 4/16/21 Verbal consent given for wound photography Wound Recommendations:   
 
Cleanse lower leg and right buttock wounds with normal saline, apply honey sheet, cover with optifoam border dressing every other day and prn if becomes soiled Miconazole powder under abdominal fold BID after cleansing area well with Remedy foaming cleanser and patting dry PI Prevention: 
Turn/reposition approximately every 2 hours Offload heels with pillows at all times in bed. Hydraguard Barrier Cream  to buttocks and sacrum TID  and as needed with incontinence care Minimize layers of linen/pads under patient to optimize support surface to one sheet and one incontinence pad Discussed with Dr. Paulie Fernandez and RN, Taran Tenorio Teaching completed with:  
[x] Patient          
[] Family member      
[] Caregiver         
[] Nursing 
[] Other Topics Discussed: Leg elevation, wound care, pressure relief Patient/Caregiver Teaching: 
Level of patient/caregiver understanding able to:  
[x] Indicates understanding       [] Needs reinforcement 
[] Unsuccessful      [] Verbal Understanding 
[] Demonstrated understanding       [] No evidence of learning 
[] Refused teaching         [] N/A Transition of Care: Plan to follow weekly and as needed while admitted to hospital.   
 
Chalo GARRIDON, RN, [facility] Certified Wound and Ostomy Nurse 
office [phone] 
pager [phone] or call  to page

## 2021-04-17 NOTE — PROGRESS NOTES
[address] [facility] in [place], [place] Inpatient Consult Progress Note Patient name: Ayanna De La O Patient : 1950 Patient MRN: [id number] Consulting MD: Ester Zhou MD 
Date of service: 21 CHIEF COMPLAINT: 
Dyspnea 
  
PLAN: 
Increased device speed to 9600rpm due to LVEDD increased to 8.3cm Previously was intolerant to higher speeds due to VT; will observe Intolerant to BB/ACEi/ARB/ARNI due to aTTR; on hold for now Bumex 1 mg IV BID; goal net negative 1-2L daily Continue tafamidis 61 mg PO daily Hydralazine 100 mg po TID, MAP goal 70-90 mmHg in absence of palpable radial pulse Intolerant of spironolactone due to hyperkalemia  
Chronic anticoagulation on Coumadin; goal INR lowered to 1.8-2.5 due to GIB Continue ranolazine, no ectopy - followed by Dr. James Linares as OP Antibiotics for chronic sternal wound infection per ID- continue Keflex po 
Use home Trilogy when napping and QHS PFTs when euvolemic SLP consult due to dysphagia appreciated WOCN consult for BLE wounds PT/OT eval 
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  
Up to date on flu, PNA, and COVID vaccinations CM to assist with dispo planning; [facility] aide already [facility] up IMPRESSION: 
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 Bella Myers) · C/b delayed extubation due to severe COPD 
· C/b critical illness polyneuropathy · C/b prolonged hospitalization post-LVAD, 55 days · C/b sternal wound infection s/p debridement (by Dr. Tito Louise) s/p wound vac · C/b sacral ulcer · Would culture positive for Staph aureus, not MRSA · C/b LVAD site drainage, improved S/p CRT-ICD · ICD fired due to electrolyte imbalance (2011) H/o breast cancer (1992) · s/p bilateral mastectomy/chemo and endometrial cancer s/p hysterectomy · Lymphedema of LLE due to cancer treatment Severe COPD with FEV1 50% Depression Atrial fibrillation H/o \"two mini strokes\" S/p fall with hip facture · Right hip hemmiarthroplasty (5/23/18) by Dr. Holland Velez) · S/p removed hip hardwarare due to pain (4/15/19) COPD severe CKD, stage 3 Hyperkalemia Pulmonary hypertension Cardiac risk factors: · Morbid obesity, Body mass index is 42.29 kg/m². · DM2 insulin dependent · JED on Trilogy · HL Urinary incontinence, severe · no procedures due to anticoagulation Endometrial cancer (2002) HTN 
HL 
  
CARDIAC IMAGING: 
Echo (9/24/19) LVEF 20-25%, AV opens 1:1, no AR Echo (5/29/18) LVEF 10-%, ramps study done, report in [facility] Echo (1/9/18) ramp study done, LVEDD 7.1cm C (11/9/18) 2 vessel disease with 90% OM, 80% PDA, DSA to PDA branch INTERVAL HISTORY: 
Feels much betters MAPs and HR at goal 
Left arm edema LVAD INTERROGATION: 
Device interrogated in person Device function normal, normal flow, no events LVAD Pump Speed (RPM): 0486 Pump Flow (LPM): 5.6 MAP: 82 
PI (Pulsitility Index): 4.1 Power: 6.3  Test: No 
Back Up  at Bedside & Labeled: Yes Power Module Test: No 
Driveline Site Care: No 
Driveline Dressing: Clean, Dry, and Intact Outpatient: No 
Testing Back up SC speed: 9400 Back up Low Speed Limit: 9200 PHYSICAL EXAM: 
Visit Vitals BP (!) 80/0 Pulse 81 Temp 97.9 °F (36.6 °C) Resp 19 Ht 5' 7\" (1.702 m) Wt 270 lb 15.1 oz (122.9 kg) SpO2 100% BMI 42.44 kg/m² General: Patient is well developed, well-nourished in no acute distress HEENT: Normocephalic and atraumatic. No scleral icterus. Pupils are equal, round and reactive to light and accomodation. No conjunctival injection. Oropharynx is clear. Neck: Supple. No evidence of thyroid enlargements or lymphadenopathy. JVD: Cannot be appreciated Lungs: Breath sounds are equal and clear bilaterally. No wheezes, rhonchi, or rales. Heart: Regular rate and rhythm with normal S1 and S2. No murmurs, gallops or rubs. Abdomen: Soft, no mass or tenderness. No organomegaly or hernia. Bowel sounds present. Genitourinary and rectal: deferred Extremities: No cyanosis, clubbing, or edema. Neurologic: No focal sensory or motor deficits are noted. Grossly intact. Psychiatric: Awake, alert an doriented x 3. Appropriate mood and affect. Skin: Warm, dry and well perfused. No lesions, nodules or rashes are noted. REVIEW OF SYSTEMS: 
General: Denies fever, night sweats. Ear, nose and throat: Denies difficulty hearing, sinus problems, runny nose, post-nasal drip, ringing in ears, mouth sores, loose teeth, ear pain, nosebleeds, sore throate, facial pain or numbess Cardiovascular: see above in the interval history Respiratory: Denies cough, wheezing, sputum production, hemoptysis. Gastrointestinal: Denies heartburn, constipation, intolerance to certain foods, diarrhea, abdominal pain, nausea, vomiting, difficulty swallowing, blood in stool Kidney and bladder: Denies painful urination, frequent urination, urgency, prostate problems and impotence Musculoskeletal: Denies joint pain, muscle weakness Skin and hair: Denies change in existing skin lesions, hair loss or increase, breast changes PAST MEDICAL HISTORY: 
Past Medical History:  
Diagnosis Date  Asthma  Cancer ([facility] [address] breast  
 Cancer ([facility]   
 endometrial  
 Congestive heart failure, unspecified  CRI (chronic renal insufficiency)  Depression  Diabetes ([facility]  Hypertension PAST SURGICAL HISTORY: 
Past Surgical History:  
Procedure Laterality Date  HX HERNIA REPAIR    
 HX HYSTERECTOMY  HX MASTECTOMY FAMILY HISTORY: 
No family history on file. SOCIAL HISTORY: 
Social History Socioeconomic History  Marital status:  Spouse name: Not on file  Number of children: Not on file  Years of education: Not on file  Highest education level: Not on file Tobacco Use  Smoking status: Never Smoker  Smokeless tobacco: Never Used Substance and Sexual Activity  Alcohol use: Not Currently LABORATORY RESULTS: 
  
Labs Latest Ref Rng & Units 4/17/2021 4/5/2021 4/2/2021 WBC 3.6 - 11.0 K/uL 2. 7(L) - 4.9  
RBC 3.80 - 5.20 M/uL 2.94(L) - 3.45(L) Hemoglobin 11.5 - 16.0 g/dL 7. 7(L) - 9. 0(L) Hematocrit 35.0 - 47.0 % 26. 3(L) - 28. 7(L) MCV 80.0 - 99.0 FL 89.5 - 83 Platelets 064 - 262 K/uL 128(L) - 181 Albumin 3.5 - 5.0 g/dL 3.6 4.0 4.2 Calcium 8.5 - 10.1 MG/DL 8.7 8.9 9.5 Glucose 65 - 100 mg/dL 130(H) 179(H) 155(H) BUN 6 - 20 MG/DL 42(H) 35(H) 43(H) Creatinine 0.55 - 1.02 MG/DL 2.64(H) 2.44(H) 2.89(H) Sodium 136 - 145 mmol/L 139 140 140 Potassium 3.5 - 5.1 mmol/L 4.6 4.9 4.9 TSH 0.36 - 3.74 uIU/mL 3.99(H) - -  
LDH 81 - 246 U/L 179 - 208 Some recent data might be hidden Lab Results Component Value Date/Time TSH 3.99 (H) 04/17/2021 04:54 AM  
 TSH 2.980 10/01/2020 12:00 AM  
 
 
ALLERGY: 
Allergies Allergen Reactions  Benzodiazepines Other (comments) Respiratory issues CURRENT MEDICATIONS: 
 
Current Facility-Administered Medications:  
  warfarin (COUMADIN) tablet 4 mg, 4 mg, Oral, ONCE, Devika Walter NP 
  cephALEXin (KEFLEX) capsule 250 mg, 250 mg, Oral, BID, Maggie Esquivel MD 
  sodium chloride (NS) flush 5-40 mL, 5-40 mL, IntraVENous, Q8H, Polliard, Dana T, NP, 10 mL at 04/17/21 1429 
  sodium chloride (NS) flush 5-40 mL, 5-40 mL, IntraVENous, PRN, Polliard, Deboraha Ebbing T, NP, 20 mL at 04/17/21 1955   acetaminophen (TYLENOL) tablet 650 mg, 650 mg, Oral, Q4H PRN, Polliard, Debe Galla, NP, 650 mg at 04/17/21 9357   hydrALAZINE (APRESOLINE) 20 mg/mL injection 10 mg, 10 mg, IntraVENous, Q4H PRN, Polliard, Debe Galla, NP 
  albuterol (PROVENTIL VENTOLIN) nebulizer solution 2.5 mg, 2.5 mg, Nebulization, Q4H PRN, Polliard, Karinae Galla, NP 
  arformoterol 15 mcg/budesonide 0.5 mg neb solution, , Nebulization, BID RT, Polliard, Debe Galla, NP, Given at 04/17/21 0654   hydrALAZINE (APRESOLINE) tablet 100 mg, 100 mg, Oral, TID, Polliard, Deboraha Ebbing T, NP, 100 mg at 04/17/21 1178   hydrOXYzine HCL (ATARAX) tablet 10 mg, 10 mg, Oral, TID PRN, Polliard, Debe Galla, NP 
  insulin glargine (LANTUS) injection 20 Units, 20 Units, SubCUTAneous, BID, Polliard, Debe Galla, NP, 20 Units at 04/17/21 5301   PARoxetine (PAXIL) tablet 20 mg, 20 mg, Oral, DAILY, Polliard, Deboraha Ebbing T, NP, 20 mg at 04/17/21 8231   ranolazine ER (RANEXA) tablet 1,000 mg, 1,000 mg, Oral, BID, Polliard, Debe Galla, NP, 1,000 mg at 04/17/21 7414   tafamidis cap 61 mg (Patient Supplied), 61 mg, Oral, DAILY, Polliard, Deboraha Ebbing T, NP, 61 mg at 04/17/21 0900   bumetanide (BUMEX) injection 1 mg, 1 mg, IntraVENous, BID, Kem Chao MD, 1 mg at 04/17/21 1894   warfarin dosing per pharmacy, , Other, Rx Dosing/Monitoring, Holly Walter NP 
  miconazole (MICOTIN) 2 % powder, , Topical, BID, Yarelis Cornelius MD, Given at 04/17/21 2477 PATIENT CARE TEAM: 
Patient Care Team: 
Shima Duvall NP as PCP - General (Nurse Practitioner) Jean Maher MD (Cardiology) Ana Torres MD as Physician (Cardiology) Thank you for allowing me to participate in this patient's care. Emre Ruiz MD PhD 
Minerva Castellanos [facility] [address], [address] Phone: [phone] Fax: [phone]

## 2021-04-17 NOTE — CONSULTS
[address] [facility] [address] [facility] in [place], [place] 
LVAD Inpatient Consult Note Patient name: eKshav Estrada Patient : 1950 Patient MRN: [id number] Date of service: 21 Primary care physician: Stephen Bobo NP 
Primary F cardiologist: Alexis Cordero MD 
  
CHIEF COMPLAINT: 
Dyspnea 
  
PLAN: 
Admitted to [facility] for further evaluation of dyspnea; ddx includes PNA, RV failure and subsequent volume overload, or pump thrombosis Continue current device speed 9400rpm; intolerant to higher speed due to VT 
TTE today Expect increasing intolerance to BB/ACEi/ARB/ARNI due to aTTR; on hold for now Bumex 1 mg IV BID; goal net negative 1-2L daily Continue tafamidis 61 mg PO daily Hydralazine 100 mg po TID, MAP goal 70-90 mmHg in absence of palpable radial pulse Intolerant of spironolactone due to hyperkalemia  
Chronic anticoagulation on Coumadin; goal INR lowered to 1.8-2.5 due to GIB Continue ranolazine, no ectopy - followed by Dr. Alessia Cary as OP Antibiotics for chronic sternal wound infection per ID- continue Keflex po 
Use home Trilogy when napping and QHS PFTs when euvolemic PCXR and ABG  
SLP consult due to dysphagia appreciated WOCN consult for BLE wounds PT/OT eval 
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  
Up to date on flu, PNA, and COVID vaccinations CM to assist with dispo planning; [facility] Follow up with Primary Cardiology, Dr. Miranda Edwards as OP Follow up with pulmonology Dr. Kaitlyn Torres, COPD, trilogy management Follow up with Wil Contreras, NP Follow up with ID, Dr. Dana Bhatt; chronic Keflex suppression IMPRESSION: 
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 Francia Mills at [facility]) · C/b delayed extubation due to severe COPD 
· C/b critical illness polyneuropathy · C/b prolonged hospitalization post-LVAD, 55 days · C/b sternal wound infection s/p debridement (by Dr. Francia Mills) s/p wound vac · C/b sacral ulcer · Would culture positive for Staph aureus, not MRSA · C/b LVAD site drainage, improved S/p CRT-ICD · ICD fired due to electrolyte imbalance (2011) H/o breast cancer (1992) · s/p bilateral mastectomy/chemo and endometrial cancer s/p hysterectomy · Lymphedema of LLE due to cancer treatment Severe COPD with FEV1 50% Depression Atrial fibrillation H/o \"two mini strokes\" S/p fall with hip facture · Right hip hemmiarthroplasty (5/23/18) by Dr. Celine Oshea) · S/p removed hip hardwarare due to pain (4/15/19) COPD severe CKD, stage 3 Hyperkalemia Pulmonary hypertension Cardiac risk factors: · Morbid obesity, Body mass index is 42.29 kg/m². · DM2 insulin dependent · JED on Trilogy · HL Urinary incontinence, severe · no procedures due to anticoagulation Endometrial cancer (2002) HTN 
HL 
  
CARDIAC IMAGING: 
Echo (9/24/19) LVEF 20-25%, AV opens 1:1, no AR Echo (5/29/18) LVEF 10-%, ramps study done, report in epic Echo (1/9/18) ramp study done, LVEDD 7.1cm C (11/9/18) 2 vessel disease with 90% OM, 80% PDA, DSA to PDA branch   HEMODYNAMICS: 
[facility] Panel Physicians Referring Physician Case Authorizing Physician Jamel Oqeundo MD (Primary)  Jamel Oquendo MD  
Assisting Physician None Procedures RIGHT HEART CATH Pre Procedure Diagnosis Cardiomyopathy, unspecified type ([address] [I42.9] Post Procedure Diagnosis Non-ischemic Cardiomyopathy LVAD in situ Indications Cardiomyopathy, unspecified type ([address] [I42.9 (ICD-10-CM)] Conclusion 
 
  
· CO/CI = 5.7/2.2 (Narda) · Wedge mean = 15 mmHg. · LVAD in situ 6Fr right internal jugular Vascade closure to facilitate manual pressure INR 2.6 on warfarin Tolerated well. 
   
Complications Complications documented before study signed (11/3/2020  5:42 PM) No complications were associated with this study. Documented by Jamel Oquendo MD - 11/3/2020  5:41 PM  
  
Right Heart Right Heart Cath [place]-Janae catheter inserted via right internal jugular vein. Mild pulmonary hypertension noted. PA pressure = 36/18 mmHg. PA mean = 26 mmHg. PA Sat = 53.8 %. Mid RA mean = 14 mmHg. RV pressure = 37/11 mmHg. RV mean = 17 mmHg. Wedge pressure = 15 mmHg. AO Sat = 91 %. TDCO = 4.7 L/min. Narda CO = 5.7 L/min. TDCI- 2 
NARDA CI- 2.4 Link to printable coronary/vascular diagram report Coronary/Vascular Diagrams Phase: Baseline Data Systolic (mmHg) Diastolic (mmHg) Mean (mmHg) dP/dt (mmHg/sec) A Wave (mmHg) V Wave (mmHg) RV Pressures 37  
 17  
  239 PA Pressures 36  
 18  
 26 AO Pressures   2 RA Pressures   14  
  17  
 16 PCW Pressures   15  
  17  
 18 Phase: Baseline Data HR (bpm) TDCO (L/min) TDCI (L/min/m2) Narda CI (L/min/m2) Cardiac Output Results  4.67  
 1.96  
 2.4 Phase: Baseline Resistance PVR  SVR  PVR-I (dsc-5/m2) SVR-I  PVR/SVR  TPR  TVR  TPR-I  TVR-I  TPR/TVR Resistance Results 153.89 dsc-5  
 -167.88 dsc-5  
 366.76  
 -400.1 dsc-5/m2  
 -0.92  
 363.73 dsc-5  
 27.98 dsc-5 223.68 dsc-5/m2 66.68 dsc-5/m2  
 13 Phase: Baseline Stroke Work LVSW (gm*m) LVSW-I (gm*m/m2) RVSW (gm*m) RVSW-I (gm*m/m2) Stroke Work Results -7.66  
 -3.21  
 15.05  
 6.31 Phase: Baseline Blood Flow Results Result  Indexed Values (L/min/m2) QP  2.4 QS  2.4 Blood Oximetry 11/3/20 1227--11/3/20 1315 before discharge PA O2 Sat 53.8 %  
 
 
 
  
OTHER IMAGING: 
EGD/C-scope (19) no active bleeding and polyp removed. 
  
FAMILY HISTORY: 
Mother  76 years, diagnosed with DM, HTN, CAD Father  [de-identified]years old, HTN, CAD, MI 
Positive for DM and heart disease in the family, brother with valve replacement 
  
SOCIAL HISTORY: 
Never alcohol, never smoked, , lives alone, no illicit drug use, lives in house, children: daughter, occupation retired Lives alone. 
  
ALLERGIES: benzodiazepines and quetiapine fumerate (lethargy, resp failure to both) 
  
HISTORY OF PRESENT ILLNESS: 
Briefly, Ayanna De La O is a 78 y.o. female with multiple comorbidities listed above who was seen at the [facility] on 4/15. Upon assessment, she c/o worsening dyspnea, fatigue, orthopnea, abdominal distention, LE edema, and dizziness. She was admitted to [facility] for further evaluation and treatment. REVIEW OF SYSTEMS: 
General: Denies fever, night sweats. Ear, nose and throat: +dysphagia. Denies difficulty hearing, sinus problems, runny nose, post-nasal drip, ringing in ears, mouth sores, loose teeth, ear pain, nosebleeds, sore throate, facial pain or numbess Cardiovascular: see above in the interval history Respiratory: +dyspnea. Denies cough, wheezing, sputum production, hemoptysis. Gastrointestinal: + abdominal distention. Denies heartburn, constipation, intolerance to certain foods, diarrhea, abdominal pain, nausea, vomiting, difficulty swallowing, blood in stool Kidney and bladder: Denies painful urination, frequent urination Musculoskeletal: Denies joint pain, muscle weakness Skin and hair: Denies change in hair loss or increase, breast changes improved Redness and edema, skin breakdown RLE, LLE with healing wound PHYSICAL EXAM: 
Visit Vitals BP (!) 80/0 Pulse 78 Temp 97.9 °F (36.6 °C) Resp 27 Ht 5' 7\" (1.702 m) Wt 270 lb (122.5 kg) SpO2 92% BMI 42.29 kg/m² LVAD Pump Speed (RPM): 9400 Pump Flow (LPM): 5.5 MAP: 92 
PI (Pulsitility Index): 5.4 Power: 5.9  Test: No 
Back Up  at Bedside & Labeled: Yes Power Module Test: No 
Driveline Site Care: No 
Driveline Dressing: New drainage Outpatient: No 
   
 
Results for orders placed or performed during the hospital encounter of 04/16/21 XR CHEST PORT Narrative INDICATION: . Dyspnea Additional history: COMPARISON: Previous chest xray, 11/11/2020. LIMITATIONS: Portable technique. simplifyMD FINDINGS: Single frontal view of the chest.  
. 
Lines/tubes/surgical: Cardiac assist device in the right chest does not appear 
significantly changed. LVAD does not appear changed. Cardiac monitor leads 
overly the patient. Heart/mediastinum: Status post median sternotomy. Lungs/pleura: Low lung volumes without definite acute process. No visualized 
pleural effusion or pneumothorax. Additional Comments: None. .  
 Impression 1. No radiographic evidence of acute cardiopulmonary disease. SAMPLES BEING HELD Result Value Ref Range SAMPLES BEING HELD 2SST,1RED,1LAV   
 COMMENT Add-on orders for these samples will be processed based on acceptable specimen integrity and analyte stability, which may vary by analyte. LACTIC ACID Result Value Ref Range Lactic acid 1.1 0.4 - 2.0 MMOL/L  
PROTHROMBIN TIME + INR Result Value Ref Range INR 1.6 (H) 0.9 - 1.1 Prothrombin time 16.5 (H) 9.0 - 11.1 sec NT-PRO BNP Result Value Ref Range NT pro-BNP 9,005 (H) <125 PG/ML  
LIPID PANEL Result Value Ref Range LIPID PROFILE  Cholesterol, total 129 <200 MG/DL Triglyceride 110 <150 MG/DL  
 HDL Cholesterol 33 MG/DL  
 LDL, calculated 74 0 - 100 MG/DL VLDL, calculated 22 MG/DL  
 CHOL/HDL Ratio 3.9 0.0 - 5.0 EKG, 12 LEAD, INITIAL Result Value Ref Range Ventricular Rate 130 BPM  
 Atrial Rate 102 BPM  
 QRS Duration 162 ms Q-T Interval 468 ms QTC Calculation (Bezet) 688 ms Calculated R Axis -71 degrees Calculated T Axis 86 degrees Diagnosis Undetermined rhythm Left axis deviation Nonspecific intraventricular block Lateral infarct (cited on or before 16-APR-2021) Inferior infarct (cited on or before 16-APR-2021) When compared with ECG of 16-NOV-2020 12:03, 
Current undetermined rhythm precludes rhythm comparison, needs review Serial changes of Lateral infarct present TYPE & SCREEN Result Value Ref Range Crossmatch Expiration 04/19/2021,2359 ABO/Rh(D) O NEGATIVE Antibody screen POS Antibody ID NO ADDITIONAL ANTIBODIES DETECTED Comment Previously identified Anti Jk(b) ELSIE Poly POS   
 ELSIE IgG NEG   
 ELSIE C3b/C3d NEG Unit number [id number] Blood component type [facility] Unit division 00 Status of unit ALLOCATED ANTIGEN/ANTIBODY INFO Jk(B) NEGATIVE, Crossmatch result Compatible Unit number [id number] Blood component type [facility] Unit division 00 Status of unit ALLOCATED ANTIGEN/ANTIBODY INFO Jk(B) NEGATIVE, Crossmatch result Compatible ECHO ADULT COMPLETE Result Value Ref Range IVSd 0.70 0.60 - 0.90 cm LVIDd 8.24 (A) 3.90 - 5.30 cm LVIDs 6.91 cm  
 LVOT d 2.30 cm LVPWd 0.86 0.60 - 0.90 cm  
 BP EF 21.4 (A) 55.0 - 100.0 percent LV Ejection Fraction MOD 2C 19 percent LV Ejection Fraction MOD 4C 19 percent LV ED Vol A2C 216.01 mL  
 LV ED Vol A4C 158. 24 mL  
 LV ED Vol .47 (A) 56.0 - 104.0 mL  
 LV ES Vol A2C 175.55 mL  
 LV ES Vol A4C 128.13 mL  
 LV ES Vol .13 (A) 19.0 - 49.0 mL  
 RVIDd 5.73 cm  Left Atrium Major Axis 5.73 cm  
 LA Volume 115.48 22.0 - 52.0 mL  
 LA Area 4C 31.90 cm2 LA Vol 2C 84.18 (A) 22.00 - 52.00 mL LA Vol 4C 109.46 (A) 22.00 - 52.00 mL  
 MV A Hi 48.97 cm/s Mitral Valve E Wave Deceleration Time 197.98 ms MV E Hi 83.13 cm/s  
 E/E' ratio (averaged) 19.44   
 E/E' lateral 16.24   
 E/E' septal 22.65 LV E' Lateral Velocity 5.12 cm/s LV E' Septal Velocity 3.67 cm/s Mitral Valve Pressure Half-time 57.41 ms  
 MVA (PHT) 3.83 cm2 Pulmonic Valve Systolic Peak Instantaneous Gradient 5.19 mmHg Pulmonic Valve Max Velocity 113.94 cm/s Tapse 2.51 (A) 1.50 - 2.00 cm Triscuspid Valve Regurgitation Peak Gradient 27.56 mmHg  
 TR Max Velocity 262.49 cm/s Ao Root D 2.97 cm  
 MV E/A 1.70 LV Mass .2 67.0 - 162.0 g  
 LV Mass AL Index 138.5 43.0 - 95.0 g/m2 LVES Vol Index BP 66.2 mL/m2 LVED Vol Index BP 84.2 mL/m2 Left Atrium Minor Axis 2.49 cm LA Vol Index 50.27 16.00 - 28.00 ml/m2 LA Vol Index 36.65 16.00 - 28.00 ml/m2 LA Vol Index 47.65 16.00 - 28.00 ml/m2 LVED Vol Index A4C 68.9 mL/m2 LVED Vol Index A2C 94.0 mL/m2 LVES Vol Index A4C 55.8 mL/m2 LVES Vol Index A2C 76.4 mL/m2 Narrative · LV: Estimated LVEF is 20 - 25%. Severely dilated left ventricle. Moderate concentric hypertrophy. · LA: Severely dilated left atrium. · RV: Mildly dilated right ventricle. Mildly reduced systolic function. Pacer/ICD present. · Image quality for this study was technically difficult. · TV: Mild tricuspid valve regurgitation is present. · MV: Mild mitral valve regurgitation is present. · RA: Moderately dilated right atrium. General: Patient is morbidly obese in no acute distress HEENT: Normocephalic and atraumatic. No scleral icterus. Pupils are equal, round and reactive to light and accomodation. No conjunctival injection. Oropharynx is clear. Neck: Supple. No evidence of thyroid enlargements or lymphadenopathy.  
JVD: Cannot be appreciated Lungs: Breath sounds are equal and clear bilaterally. No wheezes, rhonchi, or rales. Heart: VAD hum Abdomen: Soft, pannus, no mass or tenderness. No organomegaly or hernia. Bowel sounds present. Genitourinary and rectal: deferred Extremities: No cyanosis, clubbing, improved Redness and edema, skin breakdown RLE; BLE edema 2+, venous stasis, LLE wrapped with ACE wrap Neurologic: No focal sensory or motor deficits are noted. Grossly intact. Psychiatric: Awake, alert and oriented x 3. Appropriate mood and affect. Skin: Warm, dry and well perfused. No lesions, nodules or rashes are noted. PAST MEDICAL HISTORY: 
Past Medical History:  
Diagnosis Date  Asthma  Cancer ([facility] breast  
 Cancer ([facility]   
 endometrial  
 Congestive heart failure, unspecified  CRI (chronic renal insufficiency)  Depression  [facility]  Hypertension PAST SURGICAL HISTORY: 
Past Surgical History:  
Procedure Laterality Date  HX HERNIA REPAIR    
 HX HYSTERECTOMY  HX MASTECTOMY FAMILY HISTORY: 
No family history on file. SOCIAL HISTORY: 
Social History Socioeconomic History  Marital status:  Spouse name: Not on file  Number of children: Not on file  Years of education: Not on file  Highest education level: Not on file Tobacco Use  Smoking status: Never Smoker  Smokeless tobacco: Never Used Substance and Sexual Activity  Alcohol use: Not Currently LABORATORY RESULTS: 
Labs Latest Ref Rng & Units 4/5/2021 4/2/2021 WBC 3.4 - 10.8 x10E3/uL - 4.9  
RBC 3.77 - 5.28 x10E6/uL - 3.45(L) Hemoglobin 11.1 - 15.9 g/dL - 9. 0(L) Hematocrit 34.0 - 46.6 % - 28. 7(L) MCV 79 - 97 fL - 83 Platelets 776 - 182 C77C1/IP - 181 Albumin 3.8 - 4.8 g/dL 4.0 4.2 Calcium 8.7 - 10.3 mg/dL 8.9 9.5 Glucose 65 - 99 mg/dL 179(H) 155(H) BUN 8 - 27 mg/dL 35(H) 43(H) Creatinine 0.57 - 1.00 mg/dL 2.44(H) 2.89(H) Sodium 134 - 144 mmol/L 140 140  
Potassium 3.5 - 5.2 mmol/L 4.9 4.9  - 226 IU/L - 208 Some recent data might be hidden ALLERGY: 
Allergies Allergen Reactions  Benzodiazepines Other (comments) Respiratory issues CURRENT MEDICATIONS: 
 
Current Facility-Administered Medications:  
  sodium chloride (NS) flush 5-40 mL, 5-40 mL, IntraVENous, Q8H, Matd, Viry Gtz T, NP, 10 mL at 04/16/21 1749 
  sodium chloride (NS) flush 5-40 mL, 5-40 mL, IntraVENous, PRN, PollSofía torres, NP 
  acetaminophen (TYLENOL) tablet 650 mg, 650 mg, Oral, Q4H PRN, Sofía Walter, NP 
  hydrALAZINE (APRESOLINE) 20 mg/mL injection 10 mg, 10 mg, IntraVENous, Q4H PRN, Sofía Walter, NP 
  albuterol (PROVENTIL VENTOLIN) nebulizer solution 2.5 mg, 2.5 mg, Nebulization, Q4H PRN, Sofía Walter NP 
  arformoterol 15 mcg/budesonide 0.5 mg neb solution, , Nebulization, BID RT, Sofía Walter, NP, Given at 04/16/21 2105   cephALEXin (KEFLEX) capsule 500 mg, 500 mg, Oral, BID, Viry Walter, NP, 500 mg at 04/16/21 1749   hydrALAZINE (APRESOLINE) tablet 100 mg, 100 mg, Oral, TID, Viry Walter, NP, 100 mg at 04/16/21 1749   hydrOXYzine HCL (ATARAX) tablet 10 mg, 10 mg, Oral, TID PRN, Sofía Walter, NP 
  insulin glargine (LANTUS) injection 20 Units, 20 Units, SubCUTAneous, BID, Sofía Walter NP 
  [START ON 4/17/2021] PARoxetine (PAXIL) tablet 20 mg, 20 mg, Oral, DAILY, Sofía Walter, NP 
  ranolazine ER (RANEXA) tablet 1,000 mg, 1,000 mg, Oral, BID, Viry Walter, NP, 1,000 mg at 04/16/21 1749 
  [START ON 4/17/2021] tafamidis cap 61 mg (Patient Supplied), 61 mg, Oral, DAILY, Sofía Walter NP 
  bumetanide (BUMEX) injection 1 mg, 1 mg, IntraVENous, BID, Linda Choi MD, 1 mg at 04/16/21 1749   warfarin dosing per pharmacy, , Other, Rx Dosing/Monitoring, Sofía Walter NP 
  miconazole (MICOTIN) 2 % powder, , Topical, BID, Beth Aj MD, Given at 04/16/21 1205 Laura Rosario ON 4/17/2021] albumin human 25% (BUMINATE) solution 25 g, 25 g, IntraVENous, Q6H, Jessica Markham MD 
 
Thank you for your referral and allowing me to participate in this patient's care. Drew Swift, TEA [place] [id number] [facility] 
[address], [address] Phone: [phone] PATIENT CARE TEAM: 
Patient Care Team: 
Caleb Leyden, NP as PCP - General (Nurse Practitioner) Héctor Kwong MD (Cardiology) Josh Eli MD as Physician (Cardiology) [facility] ATTENDING ADDENDUM Patient was seen and examined in person. Data and notes were reviewed. I have discussed and agree with the plan as noted in the NP note above without further additions. Kell Conrad MD PhD 
Beatriz [place] [phone]

## 2021-04-17 NOTE — PROGRESS NOTES
Problem: Mobility Impaired (Adult and Pediatric) Goal: *Acute Goals and Plan of Care (Insert Text) Description: FUNCTIONAL STATUS PRIOR TO ADMISSION: Patient was modified independent using a rollator for functional mobility. HOME SUPPORT PRIOR TO ADMISSION: The patient lived alone with daughter to provide assistance. Will be staying with daughter once discharged. Physical Therapy Goals Initiated 4/17/2021 1. Patient will move from supine to sit and sit to supine  and scoot up and down in bed with modified independence within 7 day(s). 2.  Patient will transfer from bed to chair and chair to bed with modified independence using the least restrictive device within 7 day(s). 3.  Patient will perform sit to stand with modified independence within 7 day(s). 4.  Patient will ambulate with modified independence for 150 feet with the least restrictive device within 7 day(s). Outcome: Progressing Towards Goal 
 
PHYSICAL THERAPY EVALUATION Patient: Negro Jenkins (26 y.o. female) Date: 4/17/2021 Primary Diagnosis: Dyspnea [R06.00] Precautions:   (LVAD) ASSESSMENT Based on the objective data described below, the patient presents with slightly impaired functional mobility as compared to baseline level 2* generalized weakness and decreased tolerance to activity following admission for dyspnea and fluid overload. At baseline she reports that she is mod I with BADLs and mobility using a rollator. Performs all LVAD switches indep and will have assistance from her daughter. Received pt supine in bed, agreeable to participation in session. Did not complete switch over to battery power during session (defer to RN). She required up to min A for supine>sit and demos good sitting balance once up. Completed sit<>stands and very short distance ambulation with up to CGA for safety - no major LOB or concern in short distance. Remained up in recliner at end of session, NAD, RN aware. All lines in tact. Anticipate that she is likely near her baseline level - will follow for additional visit or 2 to ensure safety with LVAD and gait progression. Recommending home with assist and HHPT vs none. Current Level of Function Impacting Discharge (mobility/balance): Min A to CGA for safety Functional Outcome Measure: The patient scored  on the 17/28 outcome measure which is indicative of Tinetti. Other factors to consider for discharge: high falls risk Patient will benefit from skilled therapy intervention to address the above noted impairments. PLAN : 
Recommendations and Planned Interventions: bed mobility training, transfer training, gait training, therapeutic exercises, patient and family training/education, and therapeutic activities Frequency/Duration: Patient will be followed by physical therapy:  3 times a week to address goals. Recommendation for discharge: (in order for the patient to meet his/her long term goals) To be determined: HHPT vs none This discharge recommendation: A follow-up discussion with the attending provider and/or case management is planned IF patient discharges home will need the following DME: patient owns DME required for discharge SUBJECTIVE:  
Patient stated I feel so much better.  OBJECTIVE DATA SUMMARY:  
HISTORY:   
Past Medical History:  
Diagnosis Date Asthma Cancer ([facility] breast  
 Cancer ([facility]   
 endometrial  
 Congestive heart failure, unspecified CRI (chronic renal insufficiency) Depression Diabetes ([facility] Hypertension Past Surgical History:  
Procedure Laterality Date HX HERNIA REPAIR    
 HX HYSTERECTOMY    
 HX MASTECTOMY Personal factors and/or comorbidities impacting plan of care: PMHx Home Situation Home Environment: Private residence # Steps to Enter: 0 One/Two Story Residence: One story Living Alone: Yes Support Systems: Child(ivania) Patient Expects to be Discharged to[de-identified] Private residence Current DME Used/Available at Home: Nino Goddard, leonidmary, [address] [facility] Saw chair, Hospital bed, Commode, bedside, Wheelchair EXAMINATION/PRESENTATION/DECISION MAKING:  
Critical Behavior: 
Neurologic State: Alert Orientation Level: Oriented X4 Cognition: Appropriate decision making Safety/Judgement: Awareness of environment, Good awareness of safety precautions Hearing: 
  
Skin:   
Edema:  
Range Of Motion: 
AROM: Generally decreased, functional 
PROM: Generally decreased, functional 
  
Strength:   
Strength: Generally decreased, functional 
 
  
Tone & Sensation:  
Tone: Normal 
   
Coordination: 
Coordination: Within functional limits Vision: 
Functional Mobility: 
Bed Mobility: 
  
Supine to Sit: Minimum assistance Scooting: Contact guard assistance Transfers: 
Sit to Stand: Contact guard assistance Stand to Sit: Contact guard assistance Balance:  
Sitting: Intact Standing: Impaired; Without support Standing - Static: Good Standing - Dynamic : Good;Fair Ambulation/Gait Training: 
Distance (ft): 10 Feet (ft) Assistive Device: Gait belt Ambulation - Level of Assistance: Contact guard assistance Gait Description (WDL): Exceptions to [facility] Gait Abnormalities: Decreased step clearance;Shuffling gait Base of Support: Narrowed Speed/Ivana: Slow;Shuffled Step Length: Left shortened;Right shortened Functional Measure: 
Tinetti test: 
 
Sitting Balance: 1 Arises: 1 Attempts to Rise: 2 Immediate Standing Balance: 1 Standing Balance: 1 Nudged: 0 Eyes Closed: 0 Turn 360 Degrees - Continuous/Discontinuous: 0 Turn 360 Degrees - Steady/Unsteady: 0 Sitting Down: 1 Balance Score: 7 Balance total score Indication of Gait: 1 
R Step Length/Height: 1 L Step Length/Height: 1 
R Foot Clearance: 1 L Foot Clearance: 1 Step Symmetry: 1 Step Continuity: 1 Path: 1 Trunk: 1 Walking Time: 1 Gait Score: 10 Gait total score Total Score: 17/28 Overall total score Tinetti Tool Score Risk of Falls 
<19 = High Fall Risk 19-24 = Moderate Fall Risk 25-28 = Low Fall Risk Radha VALENTIN. Performance-Oriented Assessment of Mobility Problems in Elderly Patients. [facility] 66; [id number]. (Scoring Description: PT Bulletin Feb. 10, 1993) Older adults: Abhay Winters et al, 2009; n = [address] elderly evaluated with ABC, FELIZ, ADL, and IADL) · Mean FELIZ score for males aged 69-68 years = 26.21(3.40) · Mean FELIZ score for females age 69-68 years = 25.16(4.30) · Mean FELIZ score for males over 80 years = 23.29(6.02) · Mean FELIZ score for females over 80 years = 17.20(8.32) Physical Therapy Evaluation Charge Determination History Examination Presentation Decision-Making HIGH Complexity :3+ comorbidities / personal factors will impact the outcome/ POC  LOW Complexity : 1-2 Standardized tests and measures addressing body structure, function, activity limitation and / or participation in recreation  LOW Complexity : Stable, uncomplicated  HIGH Complexity : FOTO score of 1- 25 Based on the above components, the patient evaluation is determined to be of the following complexity level: LOW Activity Tolerance:  
SpO2 stable on RA After treatment patient left in no apparent distress:  
Sitting in chair and Call bell within reach COMMUNICATION/EDUCATION:  
The patients plan of care was discussed with: Registered nurse. Fall prevention education was provided and the patient/caregiver indicated understanding., Patient/family have participated as able in goal setting and plan of care. , and Patient/family agree to work toward stated goals and plan of care. Thank you for this referral. 
Alexandra Akins, PT, DPT Time Calculation: 22 mins

## 2021-04-17 NOTE — PROGRESS NOTES
[facility] Patient name: Tello Brock Patient : 1950 Date of service: 21 Primary AHF cardiologist: Loly Mistry MD 
  
CHIEF COMPLAINT: 
Chief Complaint Patient presents with  Peripheral Edema  Shortness of Breath  
 
  PLAN: 
Continue medical management of CHF per Adv CHF team 
Continue torsemide for diuresis Getting weekly labs with home health Continue LVAD management per Adv CHF team 
Repeat TTE pending She will follow up with Adv CHF team today at 1pm  
Consider lymphapress compression management for secondary lymphedema She will follow up here again in 1 month to reassess her lymphedema and see if this is advisable at that time If significant dyspnea persists once she is euvolemic may need to proceed with cardiac cath to assess for progression of CAD Not currently on a statin, will check fasting lipids now Chronic anticoagulation on Coumadin; goal INR lowered to 1.8-2.5 Follow up with pulmonology Dr. Thomas Marti, COPD, trilogy management Follow up with Arianne Dorantes, NP Follow up with ID, Dr. Gabriel Guerrier; chronic Keflex suppression IMPRESSION: 
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 Mina Roberts at [facility]) · C/b delayed extubation due to severe COPD 
· C/b critical illness polyneuropathy · C/b prolonged hospitalization post-LVAD, 55 days · C/b sternal wound infection s/p debridement (by Dr. Mina Roberts) s/p wound vac · C/b sacral ulcer · Would culture positive for Staph aureus, not MRSA · C/b LVAD site drainage, improved S/p CRT-ICD · ICD fired due to electrolyte imbalance () H/o breast cancer () · s/p bilateral mastectomy/chemo and endometrial cancer s/p hysterectomy · Lymphedema of LLE due to cancer treatment Severe COPD with FEV1 50% Atrial fibrillation Hx of TIA x 2 
CKD, stage 3 Pulmonary hypertension Dyslipidemia JED on Trilogy CARDIAC IMAGIN20: PYP scan is strongly suggestive for ATTR cardiac amyloidosis based on the H:CL ratio of  2 and semiquantitative score of 2. [address] 11/3/20 
· CO/CI = 5.7/2.2 (Sal) · Wedge mean = 15 mmHg. · LVAD in situ Echo 20 · LVAD speed 9400-AV opens each beat, MV with decreased excursion LVIDd 6.2 cm LVIDs 5.8 cm · LV: Estimated LVEF is 15 - 20%. · LA: Mildly dilated left atrium. · AV: Sclerosis with no significant stenosis. · RV: Mildly dilated right ventricle. Mildly reduced systolic function. Pacer/ICD present. Echo (19) LVEF 20-25%, AV opens 1:1, no AR Echo (18) LVEF 10-%, ramps study done, report in [facility] Echo (18) ramp study done, LVEDD 7.1cm LHC (18) 2 vessel disease with 90% OM, 80% PDA, DSA to PDA branch 
  
HEMODYNAMICS: 
RHC not done CPEST not done 6MW not done 
  
OTHER IMAGING: 
EGD/C-scope (19) no active bleeding and polyp removed. 
  
FAMILY HISTORY: 
Mother  76 years, diagnosed with DM, HTN, CAD Father  [de-identified]years old, HTN, CAD, MI 
Positive for DM and heart disease in the family, brother with valve replacement 
  
SOCIAL HISTORY: 
Never alcohol, never smoked, , lives alone, no illicit drug use, lives in house, ambulatory status indpendedn, children: daughter, occupation retired Lives alone. 
  
ALLERGIES: benzodiazepines and quetiapine fumerate (lethargy, resp failure to both) 
  
HISTORY OF PRESENT ILLNESS:  
Estella Oneill is a 78 y.o. female with multiple comorbidities listed above who presents to establish cardiovascular care.   She is currently followed by the Adv CHF team and has an LVAD in place.  
  
INTERVAL HISTORY: 
 
She gets chest tightness when she gets off trilogy machine Says that pulmonary attributes her symptoms to her cardiovascular issues She is having a lot of dyspnea with minimal exertion Says Adv CHF just changed her from from bumex to torsemide last week, says she was losing a pound/day and then they had to back off because she got lightheaded Says she is down 6-8 lbs No endurance to do PT Can only walk a few steps with rollator Sleeps in a recliner Lots of lower extremity swelling, has some weeping and two wounds from bump injuries Says she was told never to wear compression stockings No palpitations No exertional chest pain No current dizziness or lightheadedness Her daughter is with her today, daughter lives 2.5 hours away REVIEW OF SYSTEMS: 
General: Denies fever, night sweats, chills Ear, nose and throat: Denies sinus congestion, nosebleeds Cardiovascular: see above in the interval history Respiratory: +dyspnea. Denies cough, wheezing, sputum production, hemoptysis. Gastrointestinal: Denies heartburn, constipation, diarrhea, abdominal pain, nausea Kidney and bladder: Denies painful urination, frequent urination Musculoskeletal: Denies joint pain, muscle weakness Skin and hair: Denies change in hair loss, positive for LE wounds and edema with weeping PHYSICAL EXAM: 
Visit Vitals Pulse 72 Wt 271 lb (122.9 kg) SpO2 94% BMI 43.74 kg/m² General: Patient is morbidly obese in no acute distress HEENT: Normocephalic and atraumatic. No scleral icterus. .  
Neck: Supple. No carotid bruits JVD: Cannot be appreciated Lungs: Breath sounds are equal and clear bilaterally. No wheezes, rhonchi, or rales. Heart: VAD hum Abdomen: Soft, pannus, obese, + BS present. Genitourinary and rectal: deferred Extremities: No cyanosis, clubbing, bilateral redness and edema, some weeping, skin breakdown RLE and LLE; BLE edema 2+, venous stasis Neurologic: No focal sensory or motor deficits are noted. Grossly intact. Psychiatric: Awake, alert and oriented x 3. Appropriate mood and affect. Skin: Warm, dry and well perfused. PAST MEDICAL HISTORY: 
Past Medical History:  
Diagnosis Date  Asthma  [facility]  
 Cancer ([facility]   
 endometrial  
 Congestive heart failure, unspecified  CRI (chronic renal insufficiency)  Depression  Diabetes ([facility]  Hypertension PAST SURGICAL HISTORY: 
Past Surgical History:  
Procedure Laterality Date  HX HERNIA REPAIR    
 HX HYSTERECTOMY  HX MASTECTOMY    
 
 
 
SOCIAL HISTORY: 
Social History Socioeconomic History  Marital status:  Spouse name: Not on file  Number of children: Not on file  Years of education: Not on file  Highest education level: Not on file Tobacco Use  Smoking status: Never Smoker  Smokeless tobacco: Never Used Substance and Sexual Activity  Alcohol use: Not Currently LABORATORY RESULTS: 
Labs Latest Ref Rng & Units 4/18/2021 4/17/2021 4/5/2021 4/2/2021 WBC 3.6 - 11.0 K/uL 3.2(L) 2. 7(L) - 4.9  
RBC 3.80 - 5.20 M/uL 3.02(L) 2.94(L) - 3.45(L) Hemoglobin 11.5 - 16.0 g/dL 7. 8(L) 7. 7(L) - 9. 0(L) Hematocrit 35.0 - 47.0 % 26. 8(L) 26. 3(L) - 28. 7(L) MCV 80.0 - 99.0 FL 88.7 89.5 - 83 Platelets 836 - 966 K/uL 129(L) 128(L) - 181 Lymphocytes 12 - 49 % 13 - - - Monocytes 5 - 13 % 8 - - - Eosinophils 0 - 7 % 0 - - - Basophils 0 - 1 % 0 - - - Albumin 3.5 - 5.0 g/dL 3.6 3.6 4.0 4.2 Calcium 8.5 - 10.1 MG/DL 9.2 8.7 8.9 9.5 Glucose 65 - 100 mg/dL 147(H) 130(H) 179(H) 155(H) BUN 6 - 20 MG/DL 44(H) 42(H) 35(H) 43(H) Creatinine 0.55 - 1.02 MG/DL 3.13(H) 2.64(H) 2.44(H) 2.89(H) Sodium 136 - 145 mmol/L 136 139 140 140 Potassium 3.5 - 5.1 mmol/L 4.7 4.6 4.9 4.9 TSH 0.36 - 3.74 uIU/mL - 3.99(H) - -  
LDH 81 - 246 U/L 173 179 - 208 Some recent data might be hidden ALLERGY: 
Allergies Allergen Reactions  Benzodiazepines Other (comments) Respiratory issues CURRENT MEDICATIONS: 
No current facility-administered medications for this visit. No current outpatient medications on file. Facility-Administered Medications Ordered in Other Visits:  
  cephALEXin (KEFLEX) capsule 250 mg, 250 mg, Oral, BID, Shella Barthel, MD, 250 mg at 04/18/21 1699   sodium chloride (NS) flush 5-40 mL, 5-40 mL, IntraVENous, Q8H, PollDana torres, NP, 10 mL at 04/18/21 0649 
  sodium chloride (NS) flush 5-40 mL, 5-40 mL, IntraVENous, PRN, Polliard, Judy Sprague T, NP, 20 mL at 04/17/21 2932   acetaminophen (TYLENOL) tablet 650 mg, 650 mg, Oral, Q4H PRN, Luann Walter, NP, 650 mg at 04/18/21 8498   hydrALAZINE (APRESOLINE) 20 mg/mL injection 10 mg, 10 mg, IntraVENous, Q4H PRN, Luann Walter, NP 
  albuterol (PROVENTIL VENTOLIN) nebulizer solution 2.5 mg, 2.5 mg, Nebulization, Q4H PRN, Luann Walter, NP 
  arformoterol 15 mcg/budesonide 0.5 mg neb solution, , Nebulization, BID RT, Luann Walter, NP, Given at 04/17/21 1946   hydrALAZINE (APRESOLINE) tablet 100 mg, 100 mg, Oral, TID, Judy Walter, NP, 100 mg at 04/18/21 5189   hydrOXYzine HCL (ATARAX) tablet 10 mg, 10 mg, Oral, TID PRN, Luann Walter, NP 
  insulin glargine (LANTUS) injection 20 Units, 20 Units, SubCUTAneous, BID, Luann Walter NP, 20 Units at 04/18/21 1046   PARoxetine (PAXIL) tablet 20 mg, 20 mg, Oral, DAILY, PolliardJudy, NP, 20 mg at 04/18/21 0803 
  ranolazine ER (RANEXA) tablet 1,000 mg, 1,000 mg, Oral, BID, PollLuann torres NP, 1,000 mg at 04/18/21 7427   tafamidis cap 61 mg (Patient Supplied), 61 mg, Oral, DAILY, Luann Walter NP, 61 mg at 04/18/21 8512   bumetanide (BUMEX) injection 1 mg, 1 mg, IntraVENous, BID, Albert Sandoval MD, 1 mg at 04/18/21 0800   warfarin dosing per pharmacy, , Other, Rx Dosing/Monitoring, Luann Walter NP 
  miconazole (MICOTIN) 2 % powder, , Topical, BID, Luci Allen MD, Given at 04/17/21 1730 Brooke Meadows NP [facility] Phone: [phone]

## 2021-04-17 NOTE — PROGRESS NOTES
[facility] VAD/Heart Failure Progress Note Admit Date: 2021 POD:  * No surgery found * Procedure:      
 
 Subjective: Dyspnea improved; good flows Objective:  
Vitals: 
Blood pressure (!) 80/0, pulse 79, temperature 98.4 °F (36.9 °C), resp. rate 21, height 5' 7\" (1.702 m), weight 270 lb 15.1 oz (122.9 kg), SpO2 95 %. Temp (24hrs), Av.9 °F (36.6 °C), Min:97.5 °F (36.4 °C), Max:98.4 °F (36.9 °C) Hemodynamics: 
 CO:   
 CI:   
 CVP:   
 SVR:   
 PAP Systolic:   
 PAP Diastolic:   
 PVR:   
 PV41:   
 SCV02:   
 
VAD Interrogation: LVAD (Heartmate) Pump Speed (RPM): 9400 Pump Flow (LPM): 5.4 PI (Pulsitility Index): 5.3 Power: 5.8  Test: No 
Back Up  at Bedside & Labeled: Yes Power Module Test: No 
Driveline Site Care: No 
Driveline Dressing: Clean, Dry, and Intact EKG/Rhythm:   
 
Extubation Date / Time:  
 
CT Output:  
 
Ventilator: 
  
 
Oxygen Therapy: 
Oxygen Therapy O2 Sat (%): 95 % (21) Pulse via Oximetry: 88 beats per minute (21) O2 Device: None (Room air) (21) CXR: 
 
Admission Weight: Last Weight Weight: 270 lb 6.4 oz (122.7 kg) Weight: 270 lb 15.1 oz (122.9 kg) Intake / Output / Drain: 
Current Shift:  0701 -  190 In: 200 [I.V.:200] Out: 200 [Urine:200] Last 24 hrs.:  
 
Intake/Output Summary (Last 24 hours) at 2021 1555 Last data filed at 2021 5252 Gross per 24 hour Intake 680 ml Output 800 ml Net -120 ml No results for input(s): CPK, CKMB, TROIQ in the last 72 hours. Recent Labs 21 
7796 21 
8170 NA  --  139 K  --  4.6 CO2  --  30  
BUN  --  42* CREA  --  2.64* GLU  --  130* MG  --  2.3 WBC 2.7*  --   
HGB 7.7*  --   
HCT 26.3*  --   
*  --   
 
Recent Labs 21 
0454 21 
1040 INR 1.8* 1.6* PTP 17.9* 16.5* No lab exists for component: PBNP Current Facility-Administered Medications:  
  warfarin (COUMADIN) tablet 4 mg, 4 mg, Oral, ONCE, Sachi Walter, NP 
  sodium chloride (NS) flush 5-40 mL, 5-40 mL, IntraVENous, Q8H, Sachi Walter, NP, 10 mL at 04/17/21 3210   sodium chloride (NS) flush 5-40 mL, 5-40 mL, IntraVENous, PRN, Sachi Walter, NP, 20 mL at 04/17/21 5002   acetaminophen (TYLENOL) tablet 650 mg, 650 mg, Oral, Q4H PRN, Yady Walter, NP, 650 mg at 04/17/21 6741   hydrALAZINE (APRESOLINE) 20 mg/mL injection 10 mg, 10 mg, IntraVENous, Q4H PRN, Yady Walter, NP 
  albuterol (PROVENTIL VENTOLIN) nebulizer solution 2.5 mg, 2.5 mg, Nebulization, Q4H PRN, Yady Walter NP 
  arformoterol 15 mcg/budesonide 0.5 mg neb solution, , Nebulization, BID RT, Yady Walter NP, Given at 04/17/21 1539   cephALEXin (KEFLEX) capsule 500 mg, 500 mg, Oral, BID, Yady Walter, NP, 500 mg at 04/17/21 4531   hydrALAZINE (APRESOLINE) tablet 100 mg, 100 mg, Oral, TID, Sachi Walter, NP, 100 mg at 04/17/21 3204   hydrOXYzine HCL (ATARAX) tablet 10 mg, 10 mg, Oral, TID PRN, Yady Walter, ETA 
  insulin glargine (LANTUS) injection 20 Units, 20 Units, SubCUTAneous, BID, Yady Walter NP, 20 Units at 04/17/21 1881   PARoxetine (PAXIL) tablet 20 mg, 20 mg, Oral, DAILY, Sachi Walter, NP, 20 mg at 04/17/21 0304   ranolazine ER (RANEXA) tablet 1,000 mg, 1,000 mg, Oral, BID, Yady Walter NP, 1,000 mg at 04/17/21 4640   tafamidis cap 61 mg (Patient Supplied), 61 mg, Oral, DAILY, Sachi Walter NP, 61 mg at 04/17/21 0900   bumetanide (BUMEX) injection 1 mg, 1 mg, IntraVENous, BID, Deyanira Reagan MD, 1 mg at 04/17/21 240   warfarin dosing per pharmacy, , Other, Rx Dosing/Monitoring, Yady Walter NP 
  miconazole (MICOTIN) 2 % powder, , Topical, BID, Danyelle Richey MD, Given at 04/17/21 6948 A/P 
  
S/P LVAD - good flows [facility] Fluid overload - diuresis 
  
Risk of morbidity and mortality - high 
Medical decision making - high complexity Signed By: Evens Enriquez MD

## 2021-04-17 NOTE — PROGRESS NOTES
[facility] Hospitalist Progress Note Aislinn Sumner MD 
Answering service: [phone] OR [phone] from in house phone Date of Service:  2021 NAME:  Royal Valadez :  1950 MRN:  [id number] Please note that this dictation was completed with [employer], the computer voice recognition software. Quite often unanticipated grammatical, syntax, homophones, and other interpretive errors are inadvertently transcribed by the computer software. Please disregard these errors. Please excuse any errors that have escaped final proofreading. Admission Summary:  
Royal Valadez is a 78 y.o. female with a past medical history of heart failure s/p LVAD, COPD, CKD stage 3, pulmonary HTN, Depression, HTN, and HLD who presented to F appointment with complaints of chest tightness and increasing dyspnea. She was instructed to come to [facility] for admission for further treatment. At time of examination patient experiencing dyspnea but states she's feeling 100% better than what she felt yesterday. Hospitalist was consulted for admission.  
  
 
Interval history / Subjective:  
  Patient seen and examined She is doing ok on LVAD Assessment & Plan: Dyspnea due to acute on Chronic systolic congestive heart failure  
- NYHA class IV  
- s/p LVAD heartMate 2 , increased speed per HF team  
- Advanced heart failure team following  
- Continue Bumex, 1mg bid iv , strict ins and os , starte don albumin for volume - Cardiac diet  
tafamidis 61 mg PO daily Hydralazine 100 mg po TID and ranexa  
  
COPD 
- PRN nebs CKD stage 3 Avoid nephrotoxic agents and check BMP in am  
  
DM  
- A1c 7.2  
- Monitor BG AC/HS  
- Continue lantus 20 units  
  
BLE edema with ulcers - Elevate legs - Wound care following  
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  
  
JED - Continue home trilogy 
  
Morbid Obesity - BMI 42.29 
- Weight loss management to be followed up outpatient Depression Paroxetine H/o breast cancer (1992) · s/p bilateral mastectomy/chemo and endometrial cancer s/p hysterectomy Atrial fibrillation History of hyperkalemia Code status: full DVT prophylaxis:  
 
Care Plan discussed with: Patient/Family Anticipated Disposition: Home w/Family Anticipated Discharge: 24 hours to 48 hours Hospital Problems  Date Reviewed: 3/16/2021 Codes Class Noted POA Dyspnea ICD-10-CM: R06.00 
ICD-9-CM: 786.09  4/16/2021 Unknown Review of Systems: A comprehensive review of systems was negative except for that written in the HPI. Vital Signs:  
 Last 24hrs VS reviewed since prior progress note. Most recent are: 
Visit Vitals BP (!) 80/0 Pulse 81 Temp 97.9 °F (36.6 °C) Resp 19 Ht 5' 7\" (1.702 m) Wt 122.9 kg (270 lb 15.1 oz) SpO2 100% BMI 42.44 kg/m² Intake/Output Summary (Last 24 hours) at 4/17/2021 1546 Last data filed at 4/17/2021 9961 Gross per 24 hour Intake 680 ml Output 800 ml Net -120 ml Physical Examination:  
 
I had a face to face encounter with this patient and independently examined them on 04/17/21 as outlined below: 
     
Constitutional:  No acute distress, cooperative, pleasant ENT:  Oral mucosa moist, oropharynx benign. Resp:  CTA bilaterally. No wheezing/rhonchi/rales. No accessory muscle use CV:  Regular rhythm, normal rate, no murmurs, gallops, rubs, lvad hum GI:  Soft, non distended, non tender. normoactive bowel sounds, no hepatosplenomegaly Musculoskeletal:  No edema, warm, 2+ pulses throughout Neurologic:  Moves all extremities. AAOx3, CN II-XII reviewed Data Review:  
 Review and/or order of clinical lab test 
 
 
Labs:  
 
Recent Labs 04/17/21 
7864 WBC 2.7* HGB 7.7* HCT 26.3*  
* Recent Labs 04/17/21 0454   
K 4.6  CO2 30 BUN 42* CREA 2.64* * CA 8.7 MG 2.3 URICA 10.9* Recent Labs 04/17/21 0454 ALT 7* AP 52 TBILI 0.6 TP 7.4 ALB 3.6 GLOB 3.8 Recent Labs 04/17/21 
0454 04/16/21 
1040 INR 1.8* 1.6* PTP 17.9* 16.5* Recent Labs 04/17/21 0454 TIBC 252 PSAT 9*  
FERR 78 Lab Results Component Value Date/Time Folate 10.0 10/28/2020 03:56 AM  
  
No results for input(s): PH, PCO2, PO2 in the last 72 hours. No results for input(s): CPK, CKNDX, TROIQ in the last 72 hours. No lab exists for component: CPKMB Lab Results Component Value Date/Time Cholesterol, total 129 04/16/2021 10:40 AM  
 HDL Cholesterol 33 04/16/2021 10:40 AM  
 LDL, calculated 74 04/16/2021 10:40 AM  
 Triglyceride 110 04/16/2021 10:40 AM  
 CHOL/HDL Ratio 3.9 04/16/2021 10:40 AM  
 
Lab Results Component Value Date/Time Glucose (POC) 216 (H) 04/17/2021 11:29 AM  
 Glucose (POC) 150 (H) 04/17/2021 07:55 AM  
 Glucose (POC) 158 (H) 04/16/2021 09:37 PM  
 Glucose (POC) 156 (H) 11/20/2020 11:52 AM  
 Glucose (POC) 143 (H) 11/20/2020 06:45 AM  
 
Lab Results Component Value Date/Time Color Yellow/Straw 11/09/2020 12:05 AM  
 Appearance Turbid (A) 11/09/2020 12:05 AM  
 Specific gravity 1.013 11/09/2020 12:05 AM  
 pH (UA) 5.0 11/09/2020 12:05 AM  
 Protein 100 (A) 11/09/2020 12:05 AM  
 Glucose Negative 11/09/2020 12:05 AM  
 Ketone Negative 11/09/2020 12:05 AM  
 Bilirubin Negative 11/09/2020 12:05 AM  
 Urobilinogen 0.1 11/09/2020 12:05 AM  
 Nitrites Negative 11/09/2020 12:05 AM  
 Leukocyte Esterase Trace (A) 11/09/2020 12:05 AM  
 Bacteria Negative 11/09/2020 12:05 AM  
 WBC 0-5 11/09/2020 12:05 AM  
 RBC 0-5 11/09/2020 12:05 AM  
 
 
 
Medications Reviewed:  
 
Current Facility-Administered Medications Medication Dose Route Frequency  warfarin (COUMADIN) tablet 4 mg  4 mg Oral ONCE  
 cephALEXin (KEFLEX) capsule 250 mg  250 mg Oral BID  sodium chloride (NS) flush 5-40 mL  5-40 mL IntraVENous Q8H  
 sodium chloride (NS) flush 5-40 mL  5-40 mL IntraVENous PRN  
 acetaminophen (TYLENOL) tablet 650 mg  650 mg Oral Q4H PRN  
 hydrALAZINE (APRESOLINE) 20 mg/mL injection 10 mg  10 mg IntraVENous Q4H PRN  
 albuterol (PROVENTIL VENTOLIN) nebulizer solution 2.5 mg  2.5 mg Nebulization Q4H PRN  
 arformoterol 15 mcg/budesonide 0.5 mg neb solution   Nebulization BID RT  
 hydrALAZINE (APRESOLINE) tablet 100 mg  100 mg Oral TID  hydrOXYzine HCL (ATARAX) tablet 10 mg  10 mg Oral TID PRN  
 insulin glargine (LANTUS) injection 20 Units  20 Units SubCUTAneous BID  PARoxetine (PAXIL) tablet 20 mg  20 mg Oral DAILY  ranolazine ER (RANEXA) tablet 1,000 mg  1,000 mg Oral BID  tafamidis cap 61 mg (Patient Supplied)  61 mg Oral DAILY  bumetanide (BUMEX) injection 1 mg  1 mg IntraVENous BID  warfarin dosing per pharmacy   Other Rx Dosing/Monitoring  miconazole (MICOTIN) 2 % powder   Topical BID  
 
______________________________________________________________________ EXPECTED LENGTH OF STAY: - - - 
ACTUAL LENGTH OF STAY:          1 Aislinn Juan MD

## 2021-04-17 NOTE — PROGRESS NOTES
0730: Bedside and Verbal shift change report given to Tania Mcleod (oncoming nurse) by Stephy Miranda (offgoing nurse). Report included the following information SBAR, Kardex, Intake/Output, MAR, Recent Results and Cardiac Rhythm Paced. Problem: Falls - Risk of 
Goal: *Absence of Falls Description: Document Jerad Montejo Fall Risk and appropriate interventions in the flowsheet. Outcome: Progressing Towards Goal 
Note: Fall Risk Interventions: 
Mobility Interventions: Communicate number of staff needed for ambulation/transfer, OT consult for ADLs, Patient to call before getting OOB, PT Consult for mobility concerns, Strengthening exercises (ROM-active/passive), Utilize walker, cane, or other assistive device Medication Interventions: Patient to call before getting OOB, Teach patient to arise slowly Elimination Interventions: Call light in reach, Patient to call for help with toileting needs Call bell and personal effects within reach. Bed locked and in low position. Non skid footwear in place. Problem: Pressure Injury - Risk of 
Goal: *Prevention of pressure injury Description: Document Jaiem Scale and appropriate interventions in the flowsheet. Outcome: Progressing Towards Goal 
Note: Pressure Injury Interventions: 
  
 
Moisture Interventions: Apply protective barrier, creams and emollients, Check for incontinence Q2 hours and as needed, Internal/External urinary devices, Limit adult briefs, Maintain skin hydration (lotion/cream) Activity Interventions: Increase time out of bed, Pressure redistribution bed/mattress(bed type), PT/OT evaluation Mobility Interventions: Float heels, Pressure redistribution bed/mattress (bed type), PT/OT evaluation, Turn and reposition approx. every two hours(pillow and wedges) Nutrition Interventions: Document food/fluid/supplement intake Friction and Shear Interventions: Lift sheet Problem: Heart Failure: Day 1 Goal: Diagnostic Test/Procedures Outcome: Progressing Towards Goal 
Note: Labs drawn as ordered and monitored for results Goal: Nutrition/Diet Outcome: Progressing Towards Goal 
Goal: Medications Outcome: Progressing Towards Goal 
Goal: *Oxygen saturation within defined limits Outcome: Progressing Towards Goal 
Goal: *Hemodynamically stable Outcome: Progressing Towards Goal 
Goal: *Optimal pain control at patient's stated goal 
Outcome: Progressing Towards Goal 
Note: No complaints of pain Goal: *Anxiety reduced or absent Outcome: Progressing Towards Goal

## 2021-04-17 NOTE — PROGRESS NOTES
0730: Bedside shift change report received by Araceli Hopper (offgoing nurse). Report included the following information SBAR, Kardex, Procedure Summary, Intake/Output, MAR, Recent Results and Cardiac Rhythm paced. 0800: NICOM Hemodynamic Monitoring- laying in bed Visit Vitals BP (!) 80/0 Pulse 79 Temp 98.4 °F (36.9 °C) Resp 21 Ht 5' 7\" (1.702 m) Wt 122.9 kg (270 lb 15.1 oz) SpO2 96% BMI 42.44 kg/m² Baseline assessment: 
      CO 5.4 
      CI 2.3 SVI 32 
      SVV 74.7  
    
1511: NICOM Hemodynamic Monitoring- sitting up in the chair Visit Vitals BP (!) 80/0 Pulse 81 Temp 97.9 °F (36.6 °C) Resp 19 Ht 5' 7\" (1.702 m) Wt 122.9 kg (270 lb 15.1 oz) SpO2 100% BMI 42.44 kg/m² Baseline assessment: 
      CO 5.2 CI 2.3 SVI 29 
      SVV 67.6 TPR 1213

## 2021-04-17 NOTE — PROGRESS NOTES
Antimicrobial Monitoring- Cephalexin Indication:  On PTA for chronic sternal wound suppression (ID following outpt) Current regimen:  500 mg PO BID Abx regimen: Cephalexin monotherapy Recent Labs 21 
6028 21 
0901 WBC 2.7*  --   
CREA  --  2.64* BUN  --  42* Est CrCl: 27.7 ml/min; UO: 0.4 ml/kg/hr Temp (24hrs), Av.1 °F (36.7 °C), Min:97.9 °F (36.6 °C), Max:98.4 °F (36.9 °C) Cultures: None Plan: Change to cephalexin 250 mg PO BID per [facility] P&T Committee Protocol with respect to renal function (CrCl 15-29 ml/min). Pharmacy will continue to monitor patient daily and will make dosage adjustments based upon changing renal function.

## 2021-04-17 NOTE — PROGRESS NOTES
Pharmacist Note  Warfarin Dosing Consult provided for this 78 y. o.female to manage warfarin for LVAD INR Goal: 1.8-2.5 per NP (lowered due to GIB) Home regimen/ tablet size: 4mg daily per most recent fill on 4/12/21 Patient takes in evening. Drugs that may increase INR: None Drugs that may decrease INR: None Other current anticoagulants/ drugs that may increase bleeding risk: None Risk factors: Age > 72 Daily INR ordered: YES Recent Labs 04/17/21 
0458 04/17/21 
0454 04/16/21 
1040 HGB 7.7*  --   --   
INR  --  1.8* 1.6* Date               INR                  Dose 4/16       1.6  4mg 4/17                 1.8                  4mg Assessment/ Plan: Will order warfarin 4 mg PO x 1 dose. Pharmacy will continue to monitor daily and adjust therapy as indicated. Please contact the pharmacist at  for outpatient recommendations if needed.

## 2021-04-18 NOTE — PROGRESS NOTES
Bedside and Verbal shift change report given to Ko Noel (oncoming nurse) by Macie Brooks (offgoing nurse). Report included the following information SBAR, Kardex, Intake/Output, MAR, Recent Results and Cardiac Rhythm Paced.

## 2021-04-18 NOTE — PROGRESS NOTES
[facility] VAD/Heart Failure Progress Note Admit Date: 2021 POD:  * No surgery found * Procedure:      
 
 Subjective: Dyspnea somewhat improved; creatinine elevated this am so holding diuretics;  
 
 Objective:  
Vitals: 
Blood pressure (!) 80/0, pulse 84, temperature 98.2 °F (36.8 °C), resp. rate 21, height 5' 7\" (1.702 m), weight 277 lb 12.5 oz (126 kg), SpO2 100 %. Temp (24hrs), Av.1 °F (36.7 °C), Min:97.7 °F (36.5 °C), Max:98.3 °F (36.8 °C) Hemodynamics: 
 CO:   
 CI:   
 CVP:   
 SVR:   
 PAP Systolic:   
 PAP Diastolic:   
 PVR:   
 LZ76:   
 SCV02:   
 
VAD Interrogation: LVAD (Heartmate) Pump Speed (RPM): 9300 Pump Flow (LPM): 5.3 PI (Pulsitility Index): 5.9 Power: 6.1  Test: No 
Back Up  at Bedside & Labeled: Yes Power Module Test: No 
Driveline Site Care: No 
Driveline Dressing: Clean, Dry, and Intact EKG/Rhythm:   
 
Extubation Date / Time:  
 
CT Output:  
 
Ventilator: 
  
 
Oxygen Therapy: 
Oxygen Therapy O2 Sat (%): 100 % (21) Pulse via Oximetry: 84 beats per minute (21 1946) O2 Device: None (Room air) (21 110) CXR: 
 
Admission Weight: Last Weight Weight: 270 lb 6.4 oz (122.7 kg) Weight: 277 lb 12.5 oz (126 kg) Intake / Output / Drain: 
Current Shift:  0701 -  1900 In: 110 [I.V.:110] Out: - Last 24 hrs.:  
 
Intake/Output Summary (Last 24 hours) at 2021 1451 Last data filed at 2021 1107 Gross per 24 hour Intake 950 ml Output 450 ml Net 500 ml No results for input(s): CPK, CKMB, TROIQ in the last 72 hours. Recent Labs 21 
9921 21 
7012 21 
7373 21 
9653 NA  --  136  --  139 K  --  4.7  --  4.6 CO2  --  29  --  30  
BUN  --  44*  --  42* CREA  --  3.13*  --  2.64* GLU  --  147*  --  130* MG  --  2.4  --  2.3 WBC 3.2*  --  2.7*  --   
HGB 7.8*  --  7.7*  --   
HCT 26.8*  --  26.3*  --   
*  -- 128*  --   
 
Recent Labs 04/18/21 
4529 04/17/21 
0454 04/16/21 
1040 INR 1.8* 1.8* 1.6* PTP 18.7* 17.9* 16.5* No lab exists for component: PBNP Current Facility-Administered Medications:  
  iron sucrose (VENOFER) 200 mg in 0.9% sodium chloride 100 mL IVPB, 200 mg, IntraVENous, DAILY, Toribio Goodpasture, MD, Last Rate: 440 mL/hr at 04/18/21 0942, 200 mg at 04/18/21 4473   warfarin (COUMADIN) tablet 4 mg, 4 mg, Oral, ONCE, Lucina Walter NP 
  cephALEXin (KEFLEX) capsule 250 mg, 250 mg, Oral, BID, Toribio Goodpasture, MD, 250 mg at 04/18/21 0047   sodium chloride (NS) flush 5-40 mL, 5-40 mL, IntraVENous, Q8H, Dana Walter NP, 10 mL at 04/18/21 1436   sodium chloride (NS) flush 5-40 mL, 5-40 mL, IntraVENous, PRN, Gina Walter NP, 20 mL at 04/17/21 4295   acetaminophen (TYLENOL) tablet 650 mg, 650 mg, Oral, Q4H PRN, Lucina Walter NP, 650 mg at 04/18/21 2905   hydrALAZINE (APRESOLINE) 20 mg/mL injection 10 mg, 10 mg, IntraVENous, Q4H PRN, Lucina Walter NP 
  albuterol (PROVENTIL VENTOLIN) nebulizer solution 2.5 mg, 2.5 mg, Nebulization, Q4H PRN, Lucina Walter NP 
  arformoterol 15 mcg/budesonide 0.5 mg neb solution, , Nebulization, BID RT, Lucina Waltre NP, Given at 04/18/21 9495   hydrALAZINE (APRESOLINE) tablet 100 mg, 100 mg, Oral, TID, Gina Walter NP, 100 mg at 04/18/21 4271   hydrOXYzine HCL (ATARAX) tablet 10 mg, 10 mg, Oral, TID PRN, Lucina Walter NP 
  insulin glargine (LANTUS) injection 20 Units, 20 Units, SubCUTAneous, BID, Lucina Walter NP, 20 Units at 04/18/21 4856   PARoxetine (PAXIL) tablet 20 mg, 20 mg, Oral, DAILY, Gina Walter NP, 20 mg at 04/18/21 0803 
  ranolazine ER (RANEXA) tablet 1,000 mg, 1,000 mg, Oral, BID, Lucina Walter NP, 1,000 mg at 04/18/21 0686   tafamidis cap 61 mg (Patient Supplied), 61 mg, Oral, DAILY, Lucina Walter NP, 61 mg at 04/18/21 0815   [Held by provider] bumetanide (BUMEX) injection 1 mg, 1 mg, IntraVENous, BID, Albert Flower MD, 1 mg at 04/18/21 0800   warfarin dosing per pharmacy, , Other, Rx Dosing/Monitoring, Sarah Walter NP 
  miconazole (MICOTIN) 2 % powder, , Topical, BID, Martine Vallecillo MD, Given at 04/18/21 3270 A/P 
  
S/P LVAD - good flows Need for [facility] - coumadin Fluid overload - diuresis Acute kidney injury - monitor  
  
Risk of morbidity and mortality - high Medical decision making - high complexity Signed By: Shaina Huerta MD

## 2021-04-18 NOTE — PROGRESS NOTES
0730: Bedside report given received by Kranthi Marquez (offgoing nurse). Report included the following information SBAR, Kardex, Procedure Summary, Intake/Output, MAR, Recent Results, and Cardiac Rhythm Paced . 1530: Bedside shift change report given to [address] (oncoming nurse). Report included the following information SBAR, Kardex, Procedure Summary, Intake/Output, MAR, Recent Results and Cardiac Rhythm paced. ------------------ Care Plan [id number] Problem: Falls - Risk of 
Goal: *Absence of Falls Description: Document Anatoliy Gloriafelicitas Fall Risk and appropriate interventions in the flowsheet. Outcome: Progressing Towards Goal 
Note: Fall Risk Interventions: 
Mobility Interventions: Communicate number of staff needed for ambulation/transfer, OT consult for ADLs, Patient to call before getting OOB, PT Consult for mobility concerns Medication Interventions: Evaluate medications/consider consulting pharmacy, Patient to call before getting OOB, Teach patient to arise slowly Elimination Interventions: Call light in reach, Patient to call for help with toileting needs, Stay With Me (per policy), Toileting schedule/hourly rounds History of Falls Interventions: Room close to nurse's station Problem: Pressure Injury - Risk of 
Goal: *Prevention of pressure injury Description: Document Jaime Scale and appropriate interventions in the flowsheet. Outcome: Progressing Towards Goal 
Note: Pressure Injury Interventions: 
  
 
Moisture Interventions: Internal/External urinary devices, Limit adult briefs, Maintain skin hydration (lotion/cream), Minimize layers Activity Interventions: Increase time out of bed, Pressure redistribution bed/mattress(bed type), PT/OT evaluation Mobility Interventions: HOB 30 degrees or less, Pressure redistribution bed/mattress (bed type), PT/OT evaluation, Turn and reposition approx. every two hours(pillow and wedges) Nutrition Interventions: Document food/fluid/supplement intake Friction and Shear Interventions: Lift sheet Problem: Heart Failure: Day 2 Goal: Activity/Safety Outcome: Progressing Towards Goal 
Goal: Nutrition/Diet Outcome: Progressing Towards Goal 
Goal: Discharge Planning Outcome: Progressing Towards Goal 
Goal: Medications Outcome: Progressing Towards Goal 
Goal: Respiratory Outcome: Progressing Towards Goal 
Goal: Treatments/Interventions/Procedures Outcome: Progressing Towards Goal 
Goal: Psychosocial 
Outcome: Progressing Towards Goal 
Goal: *Oxygen saturation within defined limits Outcome: Progressing Towards Goal 
Goal: *Hemodynamically stable Outcome: Progressing Towards Goal 
Goal: *Optimal pain control at patient's stated goal 
Outcome: Progressing Towards Goal 
Goal: *Anxiety reduced or absent Outcome: Progressing Towards Goal 
Goal: *Demonstrates progressive activity Outcome: Progressing Towards Goal

## 2021-04-18 NOTE — PROCEDURES
Central Line Procedure Note Indication: Inadequate venous access Sepsis protocol Risks, benefits, alternatives explained and patient agrees to proceed. Patient positioned in Trendelenburg. An ultrasound was used and the vascular structures were identified and marked. 7-Step Sterility Protocol followed. (cap, mask sterile gown, sterile gloves, large sterile sheet, hand hygiene, 2% chlorhexidine for cutaneous antisepsis) 5 mL 1% Lidocaine placed at insertion site. Right internal jugular cannulated x 1 attempt(s) utilizing the Seldinger technique. An 8.5 Fr Quad lumen catheter was secured with a suture and a Biopatch and sterile Tegaderm were applied. All ports were aspirated and flushed. CXR pending.

## 2021-04-18 NOTE — PROGRESS NOTES
Pharmacist Note  Warfarin Dosing Consult provided for this 78 y. o.female to manage warfarin for LVAD INR Goal: 1.8-2.5 per NP (lowered due to GIB) Home regimen/ tablet size: 4mg daily per most recent fill on 4/12/21 Patient takes in evening. Drugs that may increase INR: None Drugs that may decrease INR: None Other current anticoagulants/ drugs that may increase bleeding risk: None Risk factors: Age > 72 Daily INR ordered: YES Recent Labs 04/18/21 
2778 04/18/21 
9201 04/17/21 
0458 04/17/21 
0454 04/16/21 
1040 HGB 7.8*  --  7.7*  --   --   
INR  --  1.8*  --  1.8* 1.6* Date               INR                  Dose 4/16       1.6  4mg 4/17                 1.8                  4mg 4/18  1.8  4 mg Assessment/ Plan: Will order warfarin 4 mg PO x 1 dose. Pharmacy will continue to monitor daily and adjust therapy as indicated. Please contact the pharmacist at  for outpatient recommendations if needed.

## 2021-04-18 NOTE — PROGRESS NOTES
[address] [facility]  Hospitalist Group Hospitalist Progress Note Kelsie Ga MD 
Answering service: [phone] OR [phone] from in house phone Date of Service:  2021 NAME:  Brandan Lerner :  1950 MRN:  [id number] Please note that this dictation was completed with [employer], the computer voice recognition software. Quite often unanticipated grammatical, syntax, homophones, and other interpretive errors are inadvertently transcribed by the computer software. Please disregard these errors. Please excuse any errors that have escaped final proofreading. Admission Summary:  
Brandan Lerner is a 78 y.o. female with a past medical history of heart failure s/p LVAD, COPD, CKD stage 3, pulmonary HTN, Depression, HTN, and HLD who presented to [facility] appointment with complaints of chest tightness and increasing dyspnea. She was instructed to come to [facility] for admission for further treatment. At time of examination patient experiencing dyspnea but states she's feeling 100% better than what she felt yesterday. Hospitalist was consulted for admission.  
  
 
Interval history / Subjective:  
  Patient seen and examined She has lot of fluid on the body and is short of breath today  
renla function has worsened and she is not diuresing well She doesnot have any peripheral access She needs central line and nephrologist to help Since iv blew out , wonder she never actually received iv bumex NICOM for today Assessment & Plan: Dyspnea due to acute on Chronic systolic congestive heart failure , s/p LVAD 
- NYHA class IV  
- s/p LVAD heartMate 2 , increased speed per HF team  
- Advanced heart failure team following  
- Continue Bumex, was on albumin , now both bumex and albumin is stopped - Cardiac diet  
tafamidis 61 mg PO daily Hydralazine 100 mg po TID and ranexa Worsening dyspnea today  
  
COPD 
- PRN nebs CKD stage 3 Avoid nephrotoxic agents and worsening kidney finction  
nephro has been consulted  
  
DM  
- A1c 7.2  
- Monitor BG AC/HS  
- Continue lantus 20 units  
  
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  
  
JED - Continue home trilogy 
  
Morbid Obesity - BMI 42.29 
- Weight loss management to be followed up outpatient Depression Paroxetine H/o breast cancer (1992) · s/p bilateral mastectomy/chemo and endometrial cancer s/p hysterectomy Atrial fibrillation Warfarin New onset pancytopenia Etiology unknown at this point Keflex induced ? ? Code status: full DVT prophylaxis:  
 
Care Plan discussed with: Patient/Family Anticipated Disposition: Home w/Family Anticipated Discharge: 24 hours to 48 hours Hospital Problems  Date Reviewed: 3/16/2021 Codes Class Noted POA Dyspnea ICD-10-CM: R06.00 
ICD-9-CM: 786.09  4/16/2021 Unknown Review of Systems: A comprehensive review of systems was negative except for that written in the HPI. Vital Signs:  
 Last 24hrs VS reviewed since prior progress note. Most recent are: 
Visit Vitals BP (!) 80/0 Pulse 84 Temp 98.2 °F (36.8 °C) Resp 21 Ht 5' 7\" (1.702 m) Wt 126 kg (277 lb 12.5 oz) SpO2 100% BMI 43.51 kg/m² Intake/Output Summary (Last 24 hours) at 4/18/2021 1218 Last data filed at 4/18/2021 0030 Gross per 24 hour Intake 840 ml Output 450 ml Net 390 ml Physical Examination:  
 
I had a face to face encounter with this patient and independently examined them on 04/18/21 as outlined below: 
     
Constitutional: Acute resp distress ENT:  Oral mucosa moist, oropharynx benign. Resp: Accessory muscle use , tachypnic CV:  Regular rhythm, normal rate, no murmurs, gallops, rubs, lvad hum GI:  Soft, non distended, non tender. normoactive bowel sounds, no hepatosplenomegaly Musculoskeletal:  No edema, warm, 2+ pulses throughout Neurologic:  Moves all extremities. AAOx3, CN II-XII reviewed Data Review:  
 Review and/or order of clinical lab test 
 
 
Labs:  
 
Recent Labs 04/18/21 
9841 04/17/21 
2325 WBC 3.2* 2.7* HGB 7.8* 7.7* HCT 26.8* 26.3*  
* 128* Recent Labs 04/18/21 
4918 04/17/21 
7432  139  
K 4.7 4.6  102 CO2 29 30 BUN 44* 42* CREA 3.13* 2.64* * 130* CA 9.2 8.7 MG 2.4 2.3 URICA  --  10.9* Recent Labs 04/18/21 
0644 04/17/21 
6575 ALT 8* 7* AP 56 52 TBILI 0.6 0.6 TP 7.9 7.4 ALB 3.6 3.6 GLOB 4.3* 3.8 Recent Labs 04/18/21 
1456 04/17/21 
0454 04/16/21 
1040 INR 1.8* 1.8* 1.6* PTP 18.7* 17.9* 16.5* Recent Labs 04/17/21 
0454 TIBC 252 PSAT 9*  
FERR 78 Lab Results Component Value Date/Time Folate 10.0 10/28/2020 03:56 AM  
  
No results for input(s): PH, PCO2, PO2 in the last 72 hours. No results for input(s): CPK, CKNDX, TROIQ in the last 72 hours. No lab exists for component: CPKMB Lab Results Component Value Date/Time Cholesterol, total 129 04/16/2021 10:40 AM  
 HDL Cholesterol 33 04/16/2021 10:40 AM  
 LDL, calculated 74 04/16/2021 10:40 AM  
 Triglyceride 110 04/16/2021 10:40 AM  
 CHOL/HDL Ratio 3.9 04/16/2021 10:40 AM  
 
Lab Results Component Value Date/Time Glucose (POC) 190 (H) 04/18/2021 11:14 AM  
 Glucose (POC) 168 (H) 04/18/2021 06:48 AM  
 Glucose (POC) 178 (H) 04/17/2021 10:07 PM  
 Glucose (POC) 124 (H) 04/17/2021 05:35 PM  
 Glucose (POC) 216 (H) 04/17/2021 11:29 AM  
 
Lab Results Component Value Date/Time  Color YELLOW/STRAW 04/18/2021 10:29 AM  
 Appearance CLEAR 04/18/2021 10:29 AM  
 Specific gravity 1.011 04/18/2021 10:29 AM  
 Specific gravity 1.013 11/09/2020 12:05 AM  
 pH (UA) 6.5 04/18/2021 10:29 AM  
 Protein TRACE (A) 04/18/2021 10:29 AM  
 Glucose Negative 04/18/2021 10:29 AM  
 Ketone Negative 04/18/2021 10:29 AM  
 Bilirubin Negative 04/18/2021 10:29 AM  
 Urobilinogen 0.2 04/18/2021 10:29 AM  
 Nitrites Negative 04/18/2021 10:29 AM  
 Leukocyte Esterase Negative 04/18/2021 10:29 AM  
 Epithelial cells FEW 04/18/2021 10:29 AM  
 Bacteria Negative 04/18/2021 10:29 AM  
 WBC 0-4 04/18/2021 10:29 AM  
 RBC 0-5 04/18/2021 10:29 AM  
 
 
 
Medications Reviewed:  
 
Current Facility-Administered Medications Medication Dose Route Frequency  iron sucrose (VENOFER) 200 mg in 0.9% sodium chloride 100 mL IVPB  200 mg IntraVENous DAILY  warfarin (COUMADIN) tablet 4 mg  4 mg Oral ONCE  
 cephALEXin (KEFLEX) capsule 250 mg  250 mg Oral BID  sodium chloride (NS) flush 5-40 mL  5-40 mL IntraVENous Q8H  
 sodium chloride (NS) flush 5-40 mL  5-40 mL IntraVENous PRN  
 acetaminophen (TYLENOL) tablet 650 mg  650 mg Oral Q4H PRN  
 hydrALAZINE (APRESOLINE) 20 mg/mL injection 10 mg  10 mg IntraVENous Q4H PRN  
 albuterol (PROVENTIL VENTOLIN) nebulizer solution 2.5 mg  2.5 mg Nebulization Q4H PRN  
 arformoterol 15 mcg/budesonide 0.5 mg neb solution   Nebulization BID RT  
 hydrALAZINE (APRESOLINE) tablet 100 mg  100 mg Oral TID  hydrOXYzine HCL (ATARAX) tablet 10 mg  10 mg Oral TID PRN  
 insulin glargine (LANTUS) injection 20 Units  20 Units SubCUTAneous BID  PARoxetine (PAXIL) tablet 20 mg  20 mg Oral DAILY  ranolazine ER (RANEXA) tablet 1,000 mg  1,000 mg Oral BID  tafamidis cap 61 mg (Patient Supplied)  61 mg Oral DAILY  [Held by provider] bumetanide (BUMEX) injection 1 mg  1 mg IntraVENous BID  warfarin dosing per pharmacy   Other Rx Dosing/Monitoring  miconazole (MICOTIN) 2 % powder   Topical BID  
 
______________________________________________________________________ EXPECTED LENGTH OF STAY: - - - 
ACTUAL LENGTH OF STAY:          2 Desiree Cormier MD

## 2021-04-18 NOTE — CONSULTS
[facility] 
 [address] [facility], [address] [facility] Phone: [phone] NOTE Patient: Marianne Roy MRN: [id number]  PCP: Misty Yao NP  
:     1950  Age:   78 y.o. Sex:  female Referring physician: Michelle Drake MD 
Reason for consultation: 78 y.o. female with Dyspnea [L84.41] complicated by ROCIO Admission Date: 2021  9:18 AM  LOS: 2 days ASSESSMENT and PLAN :  
1 ROCIO/CKD3B 
- ROCIO in setting of cardio renal syndrome  
- cr worse  
- cr baseline 2.1--2.6  
- cr now at 3.1  
- will give diuril and see if she responsds 
- needs strict In/out and will request sahu 2 LVAD/EF20% with exacerbation  
- was on bumex and held by cards  
- will give Diuril and add back Bumex 3 MG BID tomorrow 3 Pancytopenia  
- Hematology consulted 4 DM 
- on lantus 5 JED  
- on trilogy device at home 6 Nep will follow Care Plan discussed with:pt and nurse Thank you for consulting [facility] in the care of your patient. Subjective: HPI: Marianne Roy is a 78 y.o.  female who has been admitted to the hospital for chest tightness and sob. She has  past medical history of heart failure s/p LVAD, COPD, CKD stage 3, pulmonary HTN, Depression, HTN, and HLD. She has chest pain and sob. She has admitted and started on Bumex. Cr has increased and so nep has been consulted Past Medical Hx:  
Past Medical History:  
Diagnosis Date  Asthma  Cancer ([address] breast  
 Cancer ([address]   
 endometrial  
 Congestive heart failure, unspecified  CRI (chronic renal insufficiency)  Depression  Diabetes [address] [address]  Hypertension Past Surgical Hx: 
  
Past Surgical History:  
Procedure Laterality Date  HX HERNIA REPAIR    
 HX HYSTERECTOMY  HX MASTECTOMY Allergies Allergen Reactions  Benzodiazepines Other (comments) Respiratory issues Social Hx:  reports that she has never smoked. She has never used smokeless tobacco. She reports previous alcohol use. No family history on file. Review of Systems: A thorough twelve point review of system was performed today. Pertinent positives and negatives are mentioned in the HPI. The reminder of the ROS is negative and noncontributory. Objective:   
Vitals:   
Vitals:  
 04/18/21 1038 04/18/21 1040 04/18/21 1044 04/18/21 1107 BP:      
Pulse: 83 91 94 84 Resp:    21 Temp:    98.2 °F (36.8 °C) SpO2:    100% Weight:      
Height:      
 
I&O's:  04/17 0701 - 04/18 0700 In: 2957 [P.O.:840; I.V.:200] Out: 650 [Urine:650] Visit Vitals BP (!) 80/0 Pulse 84 Temp 98.2 °F (36.8 °C) Resp 21 Ht 5' 7\" (1.702 m) Wt 126 kg (277 lb 12.5 oz) SpO2 100% BMI 43.51 kg/m² Physical Exam: 
General:  No apparent Distress HEENT: PERRL,  No Pallor , No Icterus Neck: Supple,no mass palpable Lungs : CTA 
CVS: RRR, S1 S2 normal, No murmur Abdomen: Soft, NT, BS + Extremities: 1+ Edema Skin: No rash or lesions. MS: No joint swelling, erythema, warmth Neurologic: non focal, AAO x 3 Psych: normal affect Laboratory Results: 
 
Recent Labs 04/18/21 
9971 04/17/21 
0454 04/16/21 
1040  139  --   
K 4.7 4.6  --   
 102  --   
CO2 29 30  --   
* 130*  --   
BUN 44* 42*  --   
CREA 3.13* 2.64*  --   
CA 9.2 8.7  --   
MG 2.4 2.3  --   
ALB 3.6 3.6  --   
ALT 8* 7*  --   
INR 1.8* 1.8* 1.6* Recent Labs 04/18/21 
1950 04/17/21 
7125 WBC 3.2* 2.7* HGB 7.8* 7.7* HCT 26.8* 26.3*  
* 128* No results found for: SDES Lab Results Component Value Date/Time  Culture result: NO GROWTH 5 DAYS 11/10/2020 06:42 PM  
 Culture result: No Growth (<1000 cfu/mL) 11/09/2020 12:05 AM  
 Culture result: SCANT Morganella morganii ssp morganii (A) 10/30/2020 11:30 AM  
 Culture result: SCANT Morganella morganii ssp morganii (A) 10/30/2020 11:30 AM  
 Culture result: RARE DIPHTHEROIDS (A) 10/30/2020 11:30 AM  
 
Recent Results (from the past 24 hour(s)) GLUCOSE, POC Collection Time: 04/17/21  5:35 PM  
Result Value Ref Range Glucose (POC) 124 (H) 65 - 100 mg/dL Performed by Yvette Hylton GLUCOSE, POC Collection Time: 04/17/21 10:07 PM  
Result Value Ref Range Glucose (POC) 178 (H) 65 - 100 mg/dL Performed by Valerie Dior LD Collection Time: 04/18/21  5:38 AM  
Result Value Ref Range  81 - 246 U/L  
PROTHROMBIN TIME + INR Collection Time: 04/18/21  5:38 AM  
Result Value Ref Range INR 1.8 (H) 0.9 - 1.1 Prothrombin time 18.7 (H) 9.0 - 11.1 sec PROCALCITONIN Collection Time: 04/18/21  5:38 AM  
Result Value Ref Range Procalcitonin 0.24 ng/mL NT-PRO BNP Collection Time: 04/18/21  5:38 AM  
Result Value Ref Range NT pro-BNP 7,545 (H) <125 PG/ML  
LACTIC ACID Collection Time: 04/18/21  5:38 AM  
Result Value Ref Range Lactic acid 0.6 0.4 - 2.0 MMOL/L  
MAGNESIUM Collection Time: 04/18/21  5:38 AM  
Result Value Ref Range Magnesium 2.4 1.6 - 2.4 mg/dL METABOLIC PANEL, COMPREHENSIVE Collection Time: 04/18/21  5:38 AM  
Result Value Ref Range Sodium 136 136 - 145 mmol/L Potassium 4.7 3.5 - 5.1 mmol/L Chloride 100 97 - 108 mmol/L  
 CO2 29 21 - 32 mmol/L Anion gap 7 5 - 15 mmol/L Glucose 147 (H) 65 - 100 mg/dL BUN 44 (H) 6 - 20 MG/DL Creatinine 3.13 (H) 0.55 - 1.02 MG/DL  
 BUN/Creatinine ratio 14 12 - 20 GFR est AA 18 (L) >60 ml/min/1.73m2 GFR est non-AA 15 (L) >60 ml/min/1.73m2 Calcium 9.2 8.5 - 10.1 MG/DL Bilirubin, total 0.6 0.2 - 1.0 MG/DL  
 ALT (SGPT) 8 (L) 12 - 78 U/L  
 AST (SGOT) 13 (L) 15 - 37 U/L Alk. phosphatase 56 45 - 117 U/L Protein, total 7.9 6.4 - 8.2 g/dL Albumin 3.6 3.5 - 5.0 g/dL Globulin 4.3 (H) 2.0 - 4.0 g/dL A-G Ratio 0.8 (L) 1.1 - 2.2    
CBC WITH AUTOMATED DIFF  Collection Time: 04/18/21 5:47 AM  
Result Value Ref Range WBC 3.2 (L) 3.6 - 11.0 K/uL  
 RBC 3.02 (L) 3.80 - 5.20 M/uL HGB 7.8 (L) 11.5 - 16.0 g/dL HCT 26.8 (L) 35.0 - 47.0 % MCV 88.7 80.0 - 99.0 FL  
 MCH 25.8 (L) 26.0 - 34.0 PG  
 MCHC 29.1 (L) 30.0 - 36.5 g/dL  
 RDW 17.5 (H) 11.5 - 14.5 % PLATELET 889 (L) 862 - 400 K/uL MPV 9.8 8.9 - 12.9 FL  
 NRBC 0.0 0  WBC ABSOLUTE NRBC 0.00 0.00 - 0.01 K/uL NEUTROPHILS 79 (H) 32 - 75 % LYMPHOCYTES 13 12 - 49 % MONOCYTES 8 5 - 13 % EOSINOPHILS 0 0 - 7 % BASOPHILS 0 0 - 1 % IMMATURE GRANULOCYTES 0 0.0 - 0.5 % ABS. NEUTROPHILS 2.5 1.8 - 8.0 K/UL  
 ABS. LYMPHOCYTES 0.4 (L) 0.8 - 3.5 K/UL  
 ABS. MONOCYTES 0.3 0.0 - 1.0 K/UL  
 ABS. EOSINOPHILS 0.0 0.0 - 0.4 K/UL  
 ABS. BASOPHILS 0.0 0.0 - 0.1 K/UL  
 ABS. IMM. GRANS. 0.0 0.00 - 0.04 K/UL  
 DF SMEAR SCANNED    
 RBC COMMENTS ANISOCYTOSIS 1+ RETICULOCYTE COUNT Collection Time: 04/18/21  5:47 AM  
Result Value Ref Range Reticulocyte count 1.8 0.7 - 2.1 % Absolute Retic Cnt. 0.0544 0.0164 - 0.0776 M/ul SED RATE (ESR) Collection Time: 04/18/21  5:47 AM  
Result Value Ref Range Sed rate, automated 59 (H) 0 - 30 mm/hr GLUCOSE, POC Collection Time: 04/18/21  6:48 AM  
Result Value Ref Range Glucose (POC) 168 (H) 65 - 100 mg/dL Performed by Shawn Hawkins URINALYSIS W/ REFLEX CULTURE Collection Time: 04/18/21 10:29 AM  
 Specimen: Urine Result Value Ref Range Color YELLOW/STRAW Appearance CLEAR CLEAR Specific gravity 1.011 1.003 - 1.030    
 pH (UA) 6.5 5.0 - 8.0 Protein TRACE (A) NEG mg/dL Glucose Negative NEG mg/dL Ketone Negative NEG mg/dL Bilirubin Negative NEG Blood Negative NEG Urobilinogen 0.2 0.2 - 1.0 EU/dL Nitrites Negative NEG Leukocyte Esterase Negative NEG    
 UA:UC IF INDICATED CULTURE NOT INDICATED BY UA RESULT CNI    
 WBC 0-4 0 - 4 /hpf  
 RBC 0-5 0 - 5 /hpf Epithelial cells FEW FEW /lpf  Bacteria Negative NEG /hpf Hyaline cast 2-5 0 - 5 /lpf  
GLUCOSE, POC Collection Time: 04/18/21 11:14 AM  
Result Value Ref Range Glucose (POC) 190 (H) 65 - 100 mg/dL Performed by [facility]   
 
 
 
Urine dipstick:  
Lab Results Component Value Date/Time Color YELLOW/STRAW 04/18/2021 10:29 AM  
 Appearance CLEAR 04/18/2021 10:29 AM  
 Specific gravity 1.011 04/18/2021 10:29 AM  
 Specific gravity 1.013 11/09/2020 12:05 AM  
 pH (UA) 6.5 04/18/2021 10:29 AM  
 Protein TRACE (A) 04/18/2021 10:29 AM  
 Glucose Negative 04/18/2021 10:29 AM  
 Ketone Negative 04/18/2021 10:29 AM  
 Bilirubin Negative 04/18/2021 10:29 AM  
 Urobilinogen 0.2 04/18/2021 10:29 AM  
 Nitrites Negative 04/18/2021 10:29 AM  
 Leukocyte Esterase Negative 04/18/2021 10:29 AM  
 Epithelial cells FEW 04/18/2021 10:29 AM  
 Bacteria Negative 04/18/2021 10:29 AM  
 WBC 0-4 04/18/2021 10:29 AM  
 RBC 0-5 04/18/2021 10:29 AM  
 
 
I have reviewed the following: All pertinent labs, microbiology data, radiology imaging for my assessment Medications list Personally Reviewed   [x]      Yes     []               No    
 
Medications: 
Prior to Admission medications Medication Sig Start Date End Date Taking? Authorizing Provider  
warfarin (COUMADIN) 1 mg tablet Take 4 Tabs by mouth daily. 4/12/21   Justine Toledo NP FeroSul 325 mg (65 mg iron) tablet TAKE 1 TABLET BY MOUTH EVERY MORNING WITH BREAKFAST 4/6/21   Shelton Delgado MD  
ranolazine ER (RANEXA) 500 mg SR tablet TAKE 2 TABLETS BY MOUTH TWICE DAILY 3/16/21   Shelton Delgado MD  
torsemide [facility]) 100 mg tablet Take 0.5 Tabs by mouth daily. 3/15/21   Liliana Walter NP  
hydrALAZINE (APRESOLINE) 25 mg tablet Take 4 Tabs by mouth three (3) times daily. 2/25/21   Loki Limon NP  
insulin detemir (LEVEMIR U-100 INSULIN SC) 20 Units by SubCUTAneous route two (2) times a day.     Provider, Historical  
PARoxetine (PAXIL) 30 mg tablet Take 20 mg by mouth daily.    Provider, Historical  
warfarin (COUMADIN) 5 mg tablet Take 1 Tab by mouth daily. Take per INR tracker 1/7/21   Joni Allison NP  
enoxaparin (Lovenox) 120 mg/0.8 mL injection 120 mg by SubCUTAneous route every twelve (12) hours. 1/4/21   Favian Walter NP  
albuterol (PROVENTIL HFA, VENTOLIN HFA, PROAIR HFA) 90 mcg/actuation inhaler Take 2 Puffs by inhalation every four (4) hours as needed for Wheezing. 12/27/20   Josh Eli MD  
cephALEXin (Keflex) 500 mg capsule Take 1 Cap by mouth two (2) times a day. 12/10/20   Joni Allison NP  
tafamidis (Vyndamax) 61 mg cap Take 61 mg by mouth daily. 12/9/20   Joni Allison NP  
magnesium oxide (MAG-OX) 400 mg tablet Take 1 Tab by mouth daily. 12/8/20   Joni Allison NP  
budesonide-formoteroL (Symbicort) 160-4.5 mcg/actuation HFAA Take 2 Puffs by inhalation two (2) times daily as needed. Provider, Historical  
hydrOXYzine HCL (ATARAX) 10 mg tablet Take 10 mg by mouth three (3) times daily as needed for Itching (hives). Provider, Historical  
  
 
Thank you for allowing us to participate in the care of this patient. We will follow patient. Please dont hesitate to call with any questions Juliana Dumont MD 
[facility] [address] [place] [address], [address] [facility] Phone - [phone] Fax - [phone] 
www. [facility]

## 2021-04-18 NOTE — PROGRESS NOTES
NICOM Hemodynamic Monitoring Visit Vitals BP (!) 80/0 Pulse 79 Temp 97.7 °F (36.5 °C) Resp 22 Ht 5' 7\" (1.702 m) Wt 126 kg (277 lb 12.5 oz) SpO2 100% BMI 43.51 kg/m² Baseline assessment: 
      CO 5.9 CI 2.6 SVI 33 
      SVV 75.0 TPR 1250 MAP: 90 HR: 79 Paced

## 2021-04-18 NOTE — PROGRESS NOTES
[address] [facility] [address] [facility] in [place], [place] Inpatient Consult Progress Note Patient name: Reji Ojeda Patient : 1950 Patient MRN: [id number] Consulting MD: Radha Portillo MD 
Date of service: 21 CHIEF COMPLAINT: 
Dyspnea 
  
PLAN: 
· Nephrology consult for worsening renal function; hold diuretics, check UA/cx · ID consult for pancytopenia and persistent sed rate · Hematology consult for pancytopenia Continue increased device speed to 9600rpm due to LVEDD up to 8.3cm Previously was intolerant to higher speeds due to VT; will observe Check NICOM today Intolerant to BB/ACEi/ARB/ARNI due to aTTR; on hold for now Hold diuretics; check orthostatics; nephrology consult; check UA/cx  
Continue tafamidis 61 mg PO daily Hydralazine 100 mg po TID, MAP goal 70-90 mmHg in absence of palpable radial pulse   Intolerant of spironolactone due to hyperkalemia  
Coumadin dose per pharmacy; goal INR lowered to 1.8-2.5 due to GIB Continue ranolazine, no ectopy - followed by Dr. Margaret Webb OP Antibiotics for chronic sternal wound infection per ID- continue Keflex po 
Use home Trilogy when napping and QHS PFTs when euvolemic  
SLP consult due to dysphagia appreciated WOCN consult for BLE wounds PT/OT eval 
US of Lar Palliative care consult appreciated; patient remains full code, AMD given to patient/daughter to complete DM management per Hospitalist  
Up to date on flu, PNA, and COVID vaccinations CM to assist with dispo planning; patient [facility] and home care aide already set up  
  
IMPRESSION: 
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 Arias Amaya) · C/b delayed extubation due to severe COPD 
· C/b critical illness polyneuropathy · C/b prolonged hospitalization post-LVAD, 55 days · C/b sternal wound infection s/p debridement (by Dr. Porsche White) s/p wound vac · C/b sacral ulcer · Would culture positive for Staph aureus, not MRSA · C/b LVAD site drainage, improved S/p CRT-ICD · ICD fired due to electrolyte imbalance (2011) H/o breast cancer (1992) · s/p bilateral mastectomy/chemo and endometrial cancer s/p hysterectomy · Lymphedema of LLE due to cancer treatment Severe COPD with FEV1 50% Depression Atrial fibrillation H/o \"two mini strokes\" S/p fall with hip facture · Right hip hemmiarthroplasty (5/23/18) by Dr. Eve[facility]) · S/p removed hip hardwarare due to pain (4/15/19) COPD severe CKD, stage 3 Hyperkalemia Pulmonary hypertension Cardiac risk factors: · Morbid obesity, Body mass index is 42.29 kg/m². · DM2 insulin dependent · JED on Trilogy · HL Urinary incontinence, severe · no procedures due to anticoagulation Endometrial cancer (2002) HTN 
HL 
  
CARDIAC IMAGING: 
Echo (9/24/19) LVEF 20-25%, AV opens 1:1, no AR Echo (5/29/18) LVEF 10-%, ramps study done, report in [facility] Echo (1/9/18) ramp study done, LVEDD 7.1cm C (11/9/18) 2 vessel disease with 90% OM, 80% PDA, DSA to PDA branch 
  INTERVAL HISTORY: 
Feels much betters MAPs and HR at goal 
Left arm edema LVAD INTERROGATION: 
Device interrogated in person Device function normal, normal flow, no events LVAD Pump Speed (RPM): 9300 Pump Flow (LPM): 5.3 MAP: 88 
PI (Pulsitility Index): 5.9 Power: 6.1  Test: No 
Back Up  at Bedside & Labeled: Yes Power Module Test: No 
Driveline Site Care: No 
Driveline Dressing: Clean, Dry, and Intact Outpatient: No 
MAP in Therapeutic Range (Outpatient): Yes Testing Alarms Reviewed: Yes 
Back up SC speed: 9600 Back up Low Speed Limit: 9200 Emergency Equipment with Patient?: Yes Emergency procedures reviewed?: No 
Drive line site inspected?: No 
Drive line intergrity inspected?: Yes Drive line dressing changed?: No 
 
PHYSICAL EXAM: 
Visit Vitals BP (!) 80/0 Pulse 91 Temp 98.3 °F (36.8 °C) Resp 24 Ht 5' 7\" (1.702 m) Wt 277 lb 12.5 oz (126 kg) SpO2 92% BMI 43.51 kg/m² General: Patient is well developed, well-nourished in no acute distress HEENT: Normocephalic and atraumatic. No scleral icterus. Pupils are equal, round and reactive to light and accomodation. No conjunctival injection. Oropharynx is clear. Neck: Supple. No evidence of thyroid enlargements or lymphadenopathy. JVD: Cannot be appreciated Lungs: Breath sounds are equal and clear bilaterally. No wheezes, rhonchi, or rales. Heart: Regular rate and rhythm with normal S1 and S2. No murmurs, gallops or rubs. Abdomen: Soft, no mass or tenderness. No organomegaly or hernia. Bowel sounds present. Genitourinary and rectal: deferred Extremities: No cyanosis, clubbing, or edema. Neurologic: No focal sensory or motor deficits are noted. Grossly intact. Psychiatric: Awake, alert an doriented x 3. Appropriate mood and affect. Skin: Warm, dry and well perfused. No lesions, nodules or rashes are noted. REVIEW OF SYSTEMS: 
General: Denies fever, night sweats. Ear, nose and throat: Denies difficulty hearing, sinus problems, runny nose, post-nasal drip, ringing in ears, mouth sores, loose teeth, ear pain, nosebleeds, sore throate, facial pain or numbess Cardiovascular: see above in the interval history Respiratory: Denies cough, wheezing, sputum production, hemoptysis. Gastrointestinal: Denies heartburn, constipation, intolerance to certain foods, diarrhea, abdominal pain, nausea, vomiting, difficulty swallowing, blood in stool Kidney and bladder: Denies painful urination, frequent urination, urgency, prostate problems and impotence Musculoskeletal: Denies joint pain, muscle weakness Skin and hair: Denies change in existing skin lesions, hair loss or increase, breast changes PAST MEDICAL HISTORY: 
Past Medical History:  
Diagnosis Date  Asthma  [facility] ([facility] breast  
 Cancer ([facility]   
 endometrial  
 Congestive heart failure, unspecified  CRI (chronic renal insufficiency)  Depression  Diabetes ([facility]  Hypertension PAST SURGICAL HISTORY: 
Past Surgical History:  
Procedure Laterality Date  HX HERNIA REPAIR    
 HX HYSTERECTOMY  HX MASTECTOMY FAMILY HISTORY: 
No family history on file. SOCIAL HISTORY: 
Social History Socioeconomic History  Marital status:  Spouse name: Not on file  Number of children: Not on file  Years of education: Not on file  Highest education level: Not on file Tobacco Use  Smoking status: Never Smoker  Smokeless tobacco: Never Used Substance and Sexual Activity  Alcohol use: Not Currently LABORATORY RESULTS: 
  
Labs Latest Ref Rng & Units 4/18/2021 4/17/2021 4/5/2021 4/2/2021 WBC 3.6 - 11.0 K/uL 3.2(L) 2. 7(L) - 4.9  
RBC 3.80 - 5.20 M/uL 3.02(L) 2.94(L) - 3.45(L) Hemoglobin 11.5 - 16.0 g/dL 7. 8(L) 7. 7(L) - 9. 0(L) Hematocrit 35.0 - 47.0 % 26. 8(L) 26. 3(L) - 28. 7(L) MCV 80.0 - 99.0 FL 88.7 89.5 - 83 Platelets 144 - 014 K/uL 129(L) 128(L) - 181 Lymphocytes 12 - 49 % 13 - - - Monocytes 5 - 13 % 8 - - - Eosinophils 0 - 7 % 0 - - - Basophils 0 - 1 % 0 - - - Albumin 3.5 - 5.0 g/dL 3.6 3.6 4.0 4.2 Calcium 8.5 - 10.1 MG/DL 9.2 8.7 8.9 9.5 Glucose 65 - 100 mg/dL 147(H) 130(H) 179(H) 155(H) BUN 6 - 20 MG/DL 44(H) 42(H) 35(H) 43(H) Creatinine 0.55 - 1.02 MG/DL 3.13(H) 2.64(H) 2.44(H) 2.89(H) Sodium 136 - 145 mmol/L 136 139 140 140 Potassium 3.5 - 5.1 mmol/L 4.7 4.6 4.9 4.9 TSH 0.36 - 3.74 uIU/mL - 3.99(H) - -  
LDH 81 - 246 U/L 173 179 - 208 Some recent data might be hidden Lab Results Component Value Date/Time TSH 3.99 (H) 04/17/2021 04:54 AM  
 TSH 2.980 10/01/2020 12:00 AM  
 
 
ALLERGY: 
Allergies Allergen Reactions  Benzodiazepines Other (comments) Respiratory issues CURRENT MEDICATIONS: 
 
Current Facility-Administered Medications:  
  iron sucrose (VENOFER) 200 mg in 0.9% sodium chloride 100 mL IVPB, 200 mg, IntraVENous, DAILY, Macarena Martinez MD, Last Rate: 440 mL/hr at 04/18/21 0942, 200 mg at 04/18/21 6391   warfarin (COUMADIN) tablet 4 mg, 4 mg, Oral, ONCE, Vani Walter NP 
  cephALEXin (KEFLEX) capsule 250 mg, 250 mg, Oral, BID, Macarena Martinez MD, 250 mg at 04/18/21 0678   sodium chloride (NS) flush 5-40 mL, 5-40 mL, IntraVENous, Q8H, Dana Walter NP, 10 mL at 04/18/21 0649 
  sodium chloride (NS) flush 5-40 mL, 5-40 mL, IntraVENous, PRN, Jose Angel, Veto Plants T, NP, 20 mL at 04/17/21 0084   acetaminophen (TYLENOL) tablet 650 mg, 650 mg, Oral, Q4H PRN, Vani Walter NP, 650 mg at 04/18/21 1361   hydrALAZINE (APRESOLINE) 20 mg/mL injection 10 mg, 10 mg, IntraVENous, Q4H PRN, Vani Walter NP 
  albuterol (PROVENTIL VENTOLIN) nebulizer solution 2.5 mg, 2.5 mg, Nebulization, Q4H PRN, Vani Walter NP 
  arformoterol 15 mcg/budesonide 0.5 mg neb solution, , Nebulization, BID RT, Vani Walter NP, Given at 04/18/21 1870   hydrALAZINE (APRESOLINE) tablet 100 mg, 100 mg, Oral, TID, Polliard, Veto Plants T, NP, 100 mg at 04/18/21 6037   hydrOXYzine HCL (ATARAX) tablet 10 mg, 10 mg, Oral, TID PRN, Vani Walter NP 
  insulin glargine (LANTUS) injection 20 Units, 20 Units, SubCUTAneous, BID, Vani Walter NP, 20 Units at 04/18/21 2919   PARoxetine (PAXIL) tablet 20 mg, 20 mg, Oral, DAILY, Polliard, Veto Plants T, TEA, 20 mg at 04/18/21 0803 
  ranolazine ER (RANEXA) tablet 1,000 mg, 1,000 mg, Oral, BID, Vani Walter NP, 1,000 mg at 04/18/21 5487  tafamidis cap 61 mg (Patient Supplied), 61 mg, Oral, DAILY, Hans Walter NP, 61 mg at 04/18/21 6573   [Held by provider] bumetanide (BUMEX) injection 1 mg, 1 mg, IntraVENous, BID, Albert Lala MD, 1 mg at 04/18/21 0800   warfarin dosing per pharmacy, , Other, Rx Dosing/Monitoring, Hans Walter NP 
  miconazole (MICOTIN) 2 % powder, , Topical, BID, Vivien Cisneros MD, Given at 04/18/21 0580 PATIENT CARE TEAM: 
Patient Care Team: 
Mikie Bosworth, NP as PCP - General (Nurse Practitioner) Vicenta Del Castillo MD (Cardiology) Shefali Meng MD as Physician (Cardiology) Thank you for allowing me to participate in this patient's care. Hailey Encinas MD PhD 
Dianne Garcia [address] [place] [address], [address] Phone: [phone] Fax: [phone]

## 2021-04-18 NOTE — CONSULTS
Infectious Disease Consult Today's Date: 4/18/2021 Admit Date: 4/16/2021 Impression:  
Elevated sed rate - trending down 59 from 71 on admission Afebrile Wbc 3.2, anc 2.5, plt 129 Pancytopenia 
- hematologist has been consulted Acute on chronic systolic heart failure ROCIO on CKD Plan:  
Continue with keflex for now;  
      keflex was started as suppressive therapy for chronic sternal wound infection which has been completely healed. No active source of infection at this time. It is OK to stop the ABX if hematologist contribute pancytopenia is secondary to beta lactam. 
      Will continue to follow along during this hospitalization. Fever work up if temp >= 100.4 Anti-infectives: Po kelfex Subjective:  
Date of Consultation:  April 18, 2021 Referring Physician: Dr. Marina Gant 
 
 78 y.o. female was sent to [facility] for admission after seen Dr. Antwon Gruber office for worsening dyspnea. Pt was on CPAP over night, requested to have O2 supplement during our visit. She uses trilogy at night when she was at home. COVID 19 (-), CXR revealed no ASDZ. Pt was hospitalized between 11/9/2020 -11/20/2020 with AMS and injured left lower extremity after the fall. Pt was treated for sternal wound and lower extremity infection with IV cefepime then changed to PO keflex upon discharge. Pt was seen by us during 10/27/2020-11/7/2020 hospitalization after being treated for with the worsening of right lower extremity cellulitis and sternal wound infection. Wound cx grew from 10/30/2020 morganella morganii and rare diptheroids. She was treated with IV cefepime while hospitalized then continued with recommend to complete the ABX therapy with Levofloxacin umtil 11/11. At that time, Dr. Anabela Rowell requested for her to be on suppressive therapy for sternal wound infection, so we recommended her to continue with Keflex. During visit, pt c/o sob/ellison but no chest pain.  Reports no active drainage from lower extremity wound. Pt's medical history include CAD, chronic systolic heart failure, cardiomyopathy, s/p LVAD and ICD, hx of breast (s/p mastoidectomies, chemo) and endometrial cancer (s/p hysterectomy), type II DM, and COPD. ID team was consulted for pancytopenia and persistently elevated ESR evaluation. Patient Active Problem List  
Diagnosis Code Obesity, morbid ([facility] E66.01 Acute on chronic systolic CHF (congestive heart failure) ([facility]) I50.23 NICM (nonischemic cardiomyopathy) ([facility] I42.8 Coronary artery disease involving native coronary artery of native heart without angina pectoris I25.10 JED on CPAP G47.33, Z99.89 Chronic venous insufficiency I87.2 Ulcer of right foot, limited to breakdown of skin ([facility] L97.521 NSVT (nonsustained ventricular tachycardia) ([facility]) I47.2 Anemia D64.9 ROCIO (acute kidney injury) ([facility] N17.9 Coagulopathy ([facility] D68.9 Open wound of left lower leg S81.802A Incontinence in female R32 LVAD (left ventricular assist device) present ([facility] Z95.811 Hospital discharge follow-up Z09 ICD (implantable cardioverter-defibrillator) battery depletion Z45.02  
 HAIRSTON (dyspnea on exertion) R06.00 Dyspnea R06.00 Past Medical History:  
Diagnosis Date Asthma Cancer ([facility] breast  
 Cancer ([facility]   
 endometrial  
 Congestive heart failure, unspecified CRI (chronic renal insufficiency) Depression Diabetes ([facility] Hypertension No family history on file. Social History Tobacco Use Smoking status: Never Smoker Smokeless tobacco: Never Used Substance Use Topics Alcohol use: Not Currently Past Surgical History:  
Procedure Laterality Date HX HERNIA REPAIR    
 HX HYSTERECTOMY    
 HX MASTECTOMY Prior to Admission medications Medication Sig Start Date End Date Taking? Authorizing Provider  
warfarin (COUMADIN) 1 mg tablet Take 4 Tabs by mouth daily.  4/12/21   Stacy Chiang NP FeroSul 325 mg (65 mg iron) tablet TAKE 1 TABLET BY MOUTH EVERY MORNING WITH BREAKFAST 4/6/21   Shefali Meng MD  
ranolazine ER (RANEXA) 500 mg SR tablet TAKE 2 TABLETS BY MOUTH TWICE DAILY 3/16/21   Shefali Meng MD  
torsemide [facility]) 100 mg tablet Take 0.5 Tabs by mouth daily. 3/15/21   Hans Walter NP  
hydrALAZINE (APRESOLINE) 25 mg tablet Take 4 Tabs by mouth three (3) times daily. 2/25/21   Kasey Limon NP  
insulin detemir (LEVEMIR U-100 INSULIN SC) 20 Units by SubCUTAneous route two (2) times a day. Provider, Historical  
PARoxetine (PAXIL) 30 mg tablet Take 20 mg by mouth daily. Provider, Historical  
warfarin (COUMADIN) 5 mg tablet Take 1 Tab by mouth daily. Take per INR tracker 1/7/21   Sophie Ponce NP  
enoxaparin (Lovenox) 120 mg/0.8 mL injection 120 mg by SubCUTAneous route every twelve (12) hours. 1/4/21   Hans Walter NP  
albuterol (PROVENTIL HFA, VENTOLIN HFA, PROAIR HFA) 90 mcg/actuation inhaler Take 2 Puffs by inhalation every four (4) hours as needed for Wheezing. 12/27/20   Shefali Meng MD  
cephALEXin (Keflex) 500 mg capsule Take 1 Cap by mouth two (2) times a day. 12/10/20   Sophie Ponce NP  
tafamidis (Vyndamax) 61 mg cap Take 61 mg by mouth daily. 12/9/20   Sophie Ponce NP  
magnesium oxide (MAG-OX) 400 mg tablet Take 1 Tab by mouth daily. 12/8/20   Sophie Ponce NP  
budesonide-formoteroL (Symbicort) 160-4.5 mcg/actuation HFAA Take 2 Puffs by inhalation two (2) times daily as needed. Provider, Historical  
hydrOXYzine HCL (ATARAX) 10 mg tablet Take 10 mg by mouth three (3) times daily as needed for Itching (hives). Provider, Historical  
 
a Allergies Allergen Reactions Benzodiazepines Other (comments) Respiratory issues REVIEW OF SYSTEMS:    
Total of 12 systems reviewed as follows:  
I am not able to complete the review of systems because:  The patient is intubated and sedated The patient has altered mental status due to his acute medical problems The patient has baseline aphasia from prior stroke(s) The patient has baseline dementia and is not reliable historian POSITIVE= underlined text  Negative = text not underlined General:  fever, chills, sweats, generalized weakness, weight loss/gain,  
   loss of appetite Eyes:    blurred vision, eye pain, loss of vision, double vision ENT:    rhinorrhea, pharyngitis Respiratory:   cough, sputum production, SOB, wheezing, HAIRSTON, pleuritic pain  
Cardiology:   chest pain, palpitations, orthopnea, PND, edema, syncope Gastrointestinal:  abdominal pain , N/V, dysphagia, diarrhea, constipation, bleeding Genitourinary:  frequency, urgency, dysuria, hematuria, incontinence Muskuloskeletal :  arthralgia, myalgia Hematology:  easy bruising, nose or gum bleeding, lymphadenopathy Dermatological: rash, ulceration, pruritis Endocrine:   hot flashes or polydipsia Neurological:  headache, dizziness, confusion, focal weakness, paresthesia, Speech difficulties, memory loss, gait disturbance Psychological: Feelings of anxiety, depression, agitation Objective:  
 
Visit Vitals BP (!) 80/0 Pulse 84 Temp 98.2 °F (36.8 °C) Resp 21 Ht 5' 7\" (1.702 m) Wt 126 kg (277 lb 12.5 oz) SpO2 100% BMI 43.51 kg/m² Temp (24hrs), Av.1 °F (36.7 °C), Min:97.7 °F (36.5 °C), Max:98.3 °F (36.8 °C) Lines:  Peripheral line PHYSICAL EXAM:  
General:    morbidly obese, Alert, cooperative, no distress, appears stated age. HEENT: Atraumatic, anicteric sclerae, pink conjunctivae No oral ulcers, mucosa dry Neck:  Supple, symmetrical 
Lungs:   Clear in apex with a few crackles at bases. No Wheezing or Rhonchi. No rales. Chest wall:  No tenderness  No Accessory muscle use. LAVD HUM Heart:   Regular  rhythm,  No  murmur   Trace of pitting edema Abdomen:   Soft, non-tender. Not distended.   Bowel sounds normal 
Extremities: No cyanosis. No clubbing Skin:     Not pale. Not Jaundiced  No rashes, two ulcerated lesions in left lower extremity and one in right lower extremity; none appear infected. Psych:  Good insight. Not depressed. Not anxious or agitated. Neurologic: EOMs intact. No facial asymmetry. No aphasia or slurred speech. Alert and oriented X 4. Data Review: CBC: 
Recent Labs 04/18/21 
4047 04/17/21 
1073 WBC 3.2* 2.7* GRANS 79*  --   
MONOS 8  --   
EOS 0  --   
ANEU 2.5  --   
ABL 0.4*  --   
HGB 7.8* 7.7* HCT 26.8* 26.3*  
* 128* BMP: 
Recent Labs 04/18/21 
0368 04/17/21 
7290 CREA 3.13* 2.64* BUN 44* 42*  139  
K 4.7 4.6  102 CO2 29 30 AGAP 7 7 * 130* LFTS: 
Recent Labs 04/18/21 
3874 04/17/21 
0304 TBILI 0.6 0.6 ALT 8* 7* AP 56 52 TP 7.9 7.4 ALB 3.6 3.6 Microbiology:  
 
All Micro Results Procedure Component Value Units Date/Time URINE CULTURE HOLD SAMPLE [[id number]] Order Status: Sent Specimen: Urine Signed By: Favian Toth NP April 18, 2021

## 2021-04-19 NOTE — PROGRESS NOTES
Transitions of Care Plan:  RUR:  25%  Clinical Plan:  HF management; diureses; RHC  Consults: AHFC; Therapy; Nephrology  Baseline: ambulates with rollator; LVAD; resides alone  Disposition:  Home with home health vs daughter's home with home health    Reason for Admission:   Dyspnea                  RUR Score:     25%             PCP: First and Last name:   Phyllis Cordero NP     Name of Practice: [facility]   Are you a current patient: Yes/No: Yes   Approximate date of last visit: Recently   Can you participate in a virtual visit if needed: No    Do you (patient/family) have any concerns for transition/discharge? Patient concerned about returning home alone; daughter resides in [place], [place]; per patient - her daughter would like her to come stay with her after discharge              Plan for utilizing home health:   Open with [facility]    Current Advanced Directive/Advance Care Plan:  Full Code    Healthcare Decision Maker:  Daughter - Clarita Rubio  Click here to complete [address] including selection of the Healthcare Decision Maker Relationship (ie \"Primary\")            Primary Decision Maker: Audrey Garcia - Daughter - [phone]    Transition of Care Plan:          CM spoke with patient re initial assessment and discharge plan:    Baseline Assessment:  1) ADLs, self-care, orientation: rollator or walker for ambulation; independent with self-care; AOx3  2) Social: resides at address on file alone  3) Pharmacy: [facility] in [place], [place]  4) PCP and Insurance: Confirmed    Disposition Plan:  Patient will either discharge home with resumption of home health ([facility]) or will discharge to her daughter's home in [place], [place]. Patient will speak with her daughter today and tomorrow regarding plan. CM to reach out to daughter for details.   Should patient discharge to [facility], would likely be a permanent move - patient states daughter is renovating her home to accommodate her. CM will continue to follow. Linda Francis, MPH  Care Manager [facility]  Available via [address] [place] [address] or  [facility]    Care Management Interventions  PCP Verified by CM: Yes  Palliative Care Criteria Met (RRAT>21 & CHF Dx)?: Yes  Mode of Transport at Discharge:  Other (see comment)(Daughter, private car)  Transition of Care Consult (CM Consult): Discharge Planning, [address]: No  Reason Outside [place]: Patient already serviced by other home care/hospice agency  MyChart Signup: Yes  Discharge Durable Medical Equipment: No  Health Maintenance Reviewed: Yes  Physical Therapy Consult: Yes  Occupational Therapy Consult: Yes  Speech Therapy Consult: Yes  Current Support Network: Own Home, Lives Alone  Confirm Follow Up Transport: Family  The Plan for Transition of Care is Related to the Following Treatment Goals : Home Health - Resumption of Care  The Patient and/or Patient Representative was Provided with a Choice of Provider and Agrees with the Discharge Plan?: No  Name of the Patient Representative Who was Provided with a Choice of Provider and Agrees with the Discharge Plan: Negro Jenkins  Discharge Location  Discharge Placement: Home with home health

## 2021-04-19 NOTE — PROGRESS NOTES
1600: Bedside shift change report given to Mo Banks 90 (oncoming nurse) by Anisa Pathak RN (offgoing nurse). Report included the following information SBAR and Kardex. 1930: Bedside shift change report given to [address] [place] [address] (oncoming nurse) by Nakia Guzman RN (offgoing nurse). Report included the following information SBAR.

## 2021-04-19 NOTE — PROGRESS NOTES
Problem: Self Care Deficits Care Plan (Adult) Goal: *Acute Goals and Plan of Care (Insert Text) Description: FUNCTIONAL STATUS PRIOR TO ADMISSION: Patient was modified independent using a rollator for functional mobility. Pt lives alone, was able to perform dressing with AE (reacher.) She does not wear socks and has slip on shoes with pre tied laces. Pt was sponge bathing at home due to B LE wounds. Pt independently managed her LVAD 
 
HOME SUPPORT PRIOR TO ADMISSION: The patient lived alone with daughter to provide assistance. Will be staying with daughter once discharged. Occupational Therapy Goals Initiated 4/19/2021 1. Patient will perform upper body dressing and lower body dressing using AE PRN with modified independence within 7 day(s). 2.  Patient will perform bathing with modified independence within 7 day(s). 3.  Patient will perform standing ADLs at sink with modified independence within 7 day(s). 4.  Patient will perform toilet transfers in bathroom with least restrictive DME with modified independence within 7 day(s). 5.  Patient will perform all aspects of toileting with modified independence within 7 day(s). 6.  Patient will utilize energy conservation techniques during functional activities with verbal cues within 7 day(s). Outcome: Not Met OCCUPATIONAL THERAPY EVALUATION Patient: Earlene Perez (17 y.o. female) Date: 4/19/2021 Primary Diagnosis: Dyspnea [R06.00] Precautions:   (LVAD) ASSESSMENT Based on the objective data described below, the patient presents with HAIRSTON, decreased cardiopulmonary endurance, decreased fine motor coordination due to weakness/edema, hx LVAD x 4 years ago, and decline in functional status s/p admission for increased HAIRSTON/SOB as direct admit from MD office. Pt required mod A for supine to sit, min A x 2 for sit to stand without use of AD. Pt required min A for fine motor aspects to switch from power mode to batteries on LVAD. Will issue resistance sponges to assist. VSS, MAP 93 post activity. At thsi time, pt is below her ADL baseline, usually lives alone and can manage her ADLs and LVAD. Pending progress, may need HH with increased family assist vs short term rehab Current Level of Function Impacting Discharge (ADLs/self-care): mod A bed mobility, MIN A x 2 to stand, HAIRSTON/SOB Functional Outcome Measure: The patient scored Total: 50/100 on the Barthel Index outcome measure which is indicative of 50% impaired ability to care for basic self needs/dependency on others; inferred 100% dependency on others for instrumental ADLs. Other factors to consider for discharge: lives alone Patient will benefit from skilled therapy intervention to address the above noted impairments. PLAN : 
Recommendations and Planned Interventions: self care training, functional mobility training, therapeutic exercise, balance training, therapeutic activities, endurance activities, and patient education Frequency/Duration: Patient will be followed by occupational therapy 5 times a week to address goals. Recommendation for discharge: (in order for the patient to meet his/her long term goals) To be determined: HHOT /PT with increased assist from family vs SNF This discharge recommendation: 
Has not yet been discussed the attending provider and/or case management IF patient discharges home will need the following DME: none- has all needed DME SUBJECTIVE:  
Patient stated I do all that on my own at home.  OBJECTIVE DATA SUMMARY:  
HISTORY:  
Past Medical History:  
Diagnosis Date Asthma Cancer ([facility] [address] breast  
 [facility]   
 endometrial  
 Congestive heart failure, unspecified CRI (chronic renal insufficiency) Depression [facility] Hypertension Past Surgical History:  
Procedure Laterality Date HX HERNIA REPAIR    
 HX HYSTERECTOMY    
 HX MASTECTOMY Expanded or extensive additional review of patient history:  
 
Home Situation Home Environment: Private residence # Steps to Enter: 0 One/Two Story Residence: One story Living Alone: Yes Support Systems: Child(ivania) Patient Expects to be Discharged to[de-identified] Private residence Current DME Used/Available at Home: Duane Rosenberg, william, [address] [facility] Saw chair, Hospital bed, Commode, bedside, Wheelchair Tub or Shower Type: (sponge bathing) Hand dominance: Right EXAMINATION OF PERFORMANCE DEFICITS: 
Cognitive/Behavioral Status: 
Neurologic State: Alert; Appropriate for age Orientation Level: Oriented X4 Cognition: Appropriate decision making; Appropriate for age attention/concentration; Appropriate safety awareness; Follows commands;Recognition of people/places Perception: Appears intact Perseveration: No perseveration noted Safety/Judgement: Awareness of environment Skin: intact- drive line bandage C/D/I Edema: B LEs, hands improving Hearing: 
  
 
Vision/Perceptual:   
    
    
    
  
    
Acuity: Within Defined Limits Range of Motion: 
 
AROM: Generally decreased, functional 
  
  
  
  
  
  
  
 
Strength: 
 
Strength: Generally decreased, functional 
  
  
  
  
 
Coordination: 
Coordination: Generally decreased, functional 
Fine Motor Skills-Upper: Left Impaired;Right Impaired(decreased fine motor coordination) Gross Motor Skills-Upper: Left Intact; Right Intact Tone & Sensation: 
 
Tone: Normal 
  
  
  
  
  
  
  
 
Balance: 
Sitting: Intact Standing: Impaired; Without support Standing - Static: Good Standing - Dynamic : Fair Functional Mobility and Transfers for ADLs: 
Bed Mobility: 
Supine to Sit: Moderate assistance;Assist x1 Transfers: 
Sit to Stand: Minimum assistance;Assist x2; Additional time Stand to Sit: Minimum assistance Bed to Chair: Minimum assistance;Assist x2 Toilet Transfer : Minimum assistance(BSC, inferred) ADL Assessment: 
Feeding: Independent Oral Facial Hygiene/Grooming: Setup Bathing: Moderate assistance(decreased activity tolerance) Upper Body Dressing: Minimum assistance Lower Body Dressing: Moderate assistance Toileting: Moderate assistance ADL Intervention and task modifications: 
  
 
 Min verbal cues needed for sequencing of power leads (left loose, right tight) as well as physical A to remove. Cognitive Retraining Safety/Judgement: Awareness of environment Functional Measure: 
Barthel Index: 
 
Bathin Bladder: 5 Bowels: 10 
Groomin Dressin Feeding: 10 Mobility: 0 Stairs: 0 Toilet Use: 5 Transfer (Bed to Chair and Back): 10 Total: 50/100 The Barthel ADL Index: Guidelines 1. The index should be used as a record of what a patient does, not as a record of what a patient could do. 2. The main aim is to establish degree of independence from any help, physical or verbal, however minor and for whatever reason. 3. The need for supervision renders the patient not independent. 4. A patient's performance should be established using the best available evidence. Asking the patient, friends/relatives and nurses are the usual sources, but direct observation and common sense are also important. However direct testing is not needed. 5. Usually the patient's performance over the preceding 24-48 hours is important, but occasionally longer periods will be relevant. 6. Middle categories imply that the patient supplies over 50 per cent of the effort. 7. Use of aids to be independent is allowed. Kanchan Flanagan., Barthel, D.W. ([id number]). Functional evaluation: the Barthel Index. [address] [facility] (14)2. FORREST Gonzalez, Carlos Hernandez., Tatum Sy., [place], [address] [place], [place] [address] (). Measuring the change indisability after inpatient rehabilitation; comparison of the responsiveness of the Barthel Index and Functional [place] Measure. Journal of Neurology, Neurosurgery, and Psychiatry, 66(4), 128-811.  
Erika Keene WILDA, ROSLYN Still, & Nisa Ross M.A. (2004.) Assessment of post-stroke quality of life in cost-effectiveness studies: The usefulness of the Barthel Index and the EuroQoL-5D. [facility], 13, 120-84 Occupational Therapy Evaluation Charge Determination History Examination Decision-Making MEDIUM Complexity : Expanded review of history including physical, cognitive and psychosocial  history  MEDIUM Complexity : 3-5 performance deficits relating to physical, cognitive , or psychosocial skils that result in activity limitations and / or participation restrictions MEDIUM Complexity : Patient may present with comorbidities that affect occupational performnce. Miniml to moderate modification of tasks or assistance (eg, physical or verbal ) with assesment(s) is necessary to enable patient to complete evaluation Based on the above components, the patient evaluation is determined to be of the following complexity level: MEDIUM Pain Rating: 
none Activity Tolerance:  
Fair After treatment patient left in no apparent distress:   
Sitting in chair, Call bell within reach, and Bed / chair alarm activated COMMUNICATION/EDUCATION:  
The patients plan of care was discussed with: Physical therapy assistant, Registered nurse, and Rehabilitation technician. Patient/family have participated as able in goal setting and plan of care. This patients plan of care is appropriate for delegation to [facility] [place]. Thank you for this referral. 
Lorelei Casper OT Time Calculation: 30 mins

## 2021-04-19 NOTE — PROGRESS NOTES
[facility] 
 [address] [facility], [address] [facility] Phone: [phone]  
  
Nephrology Progress Note 
Brandan Lerner     1950     [id number] Date of Admission : 4/16/2021 04/19/21 CC: Follow up for ROCIO Assessment and Plan ROCIO on CKD 
- Progressive CKD from CRS vs IVVD from diuresis  
- renal US shows small and echogenic Kidneys consistent w/ progressive CKD 
- ordered Bumex 2 mg BID 
- consider RHC to assess volume status. Clinically difficult to assess. She is certainly fluid overloaded on exam  
- do not see a need for sahu and has hematuria likely from trauma and ? AC 
  
CKD stage III. - baseline around 2 
- presumed 2/2 DM, HTN. 
- Bone marrow Bx not done. Renal US appearance of Kidneys no suggestive of Amyloidosis  
  
Chronic systolic CHF, stage C NYHA class IV 
-Combined ischemic and nonischemic cardiomyopathy 
-S/p HM 2 LVAD implant 4/15/2017 by Dr. Noam Swanson at [facility] 
- hx of recurrent VT : off mexiletine - chronic RV failure. 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 Chronic anemia :  
+ve PYP testing.  
- anemia of chronic disease  
  
Severe COPD. Pulmonary hypertension. Chronic A. fib. History of endometrial Ca ATTR amyloidosis Interval History: 
Seen and examined Denies any N/V/CP/SOB Review of Systems: A comprehensive review of systems was negative except for that written in the HPI. Current Medications:  
Current Facility-Administered Medications Medication Dose Route Frequency  bumetanide (BUMEX) injection 2 mg  2 mg IntraVENous BID  iron sucrose (VENOFER) 200 mg in 0.9% sodium chloride 100 mL IVPB  200 mg IntraVENous DAILY  cephALEXin (KEFLEX) capsule 250 mg  250 mg Oral BID  sodium chloride (NS) flush 5-40 mL  5-40 mL IntraVENous Q8H  
 sodium chloride (NS) flush 5-40 mL  5-40 mL IntraVENous PRN  
 acetaminophen (TYLENOL) tablet 650 mg  650 mg Oral Q4H PRN  
 hydrALAZINE (APRESOLINE) 20 mg/mL injection 10 mg  10 mg IntraVENous Q4H PRN  
 albuterol (PROVENTIL VENTOLIN) nebulizer solution 2.5 mg  2.5 mg Nebulization Q4H PRN  
 arformoterol 15 mcg/budesonide 0.5 mg neb solution   Nebulization BID RT  
 hydrALAZINE (APRESOLINE) tablet 100 mg  100 mg Oral TID  hydrOXYzine HCL (ATARAX) tablet 10 mg  10 mg Oral TID PRN  
 insulin glargine (LANTUS) injection 20 Units  20 Units SubCUTAneous BID  PARoxetine (PAXIL) tablet 20 mg  20 mg Oral DAILY  ranolazine ER (RANEXA) tablet 1,000 mg  1,000 mg Oral BID  tafamidis cap 61 mg (Patient Supplied)  61 mg Oral DAILY  warfarin dosing per pharmacy   Other Rx Dosing/Monitoring  miconazole (MICOTIN) 2 % powder   Topical BID Allergies Allergen Reactions  Benzodiazepines Other (comments) Respiratory issues Objective: 
Vitals:   
Vitals:  
 04/19/21 0400 04/19/21 0717 04/19/21 0723 04/19/21 8110 BP:      
Pulse: 86   82 Resp: 20   22 Temp: 97.9 °F (36.6 °C)   97.8 °F (36.6 °C) SpO2: 97% 100%  96% Weight:   125 kg (275 lb 9.2 oz) Height:      
 
Intake and Output: 
04/19 0701 - 04/19 1900 In: -  
Out: 200 [Urine:200] 04/17 1901 - 04/19 0700 In: 950 [P.O.:840; I.V.:110] Out: 1550 [[id number]] Physical Examination: 
General: Morbidly obese, in NAD [place] Longest Neck: Supple,no mass palpable Lungs : CTA 
CVS: RRR, VAD sounds Abdomen: Soft, NT, BS +, obese Extremities: LUE lymphedema Skin: No rash or lesions. MS: No joint swelling, erythema, warmth Neurologic: awake and alert 
  
 
[]    High complexity decision making was performed 
[]    Patient is at high-risk of decompensation with multiple organ involvement Lab Data Personally Reviewed: I have reviewed all the pertinent labs, microbiology data and radiology studies during assessment. Recent Labs 04/19/21 
0544 04/18/21 
3397 04/17/21 
0454 04/16/21 
1040  136 139  --   
K 4.0 4.7 4.6  --   
 100 102  -- CO2 30 29 30  --   
* 147* 130*  --   
BUN 43* 44* 42*  --   
CREA 3.02* 3.13* 2.64*  --   
CA 8.9 9.2 8.7  --   
MG 2.4 2.4 2.3  --   
ALB 3.6 3.6 3.6  --   
ALT 6* 8* 7*  --   
INR 1.8* 1.8* 1.8* 1.6* Recent Labs 04/19/21 
7786 04/18/21 
9190 04/17/21 
3251 WBC 3.0* 3.2* 2.7* HGB 7.6* 7.8* 7.7* HCT 25.5* 26.8* 26.3*  
* 129* 128* No results found for: SDES Lab Results Component Value Date/Time Culture result: NO GROWTH 5 DAYS 11/10/2020 06:42 PM  
 Culture result: No Growth (<1000 cfu/mL) 11/09/2020 12:05 AM  
 Culture result: SCANT Morganella morganii ssp morganii (A) 10/30/2020 11:30 AM  
 Culture result: SCANT Morganella morganii ssp morganii (A) 10/30/2020 11:30 AM  
 Culture result: RARE DIPHTHEROIDS (A) 10/30/2020 11:30 AM  
 
Recent Results (from the past 24 hour(s)) URINALYSIS W/ REFLEX CULTURE Collection Time: 04/18/21 10:29 AM  
 Specimen: Urine Result Value Ref Range Color YELLOW/STRAW Appearance CLEAR CLEAR Specific gravity 1.011 1.003 - 1.030    
 pH (UA) 6.5 5.0 - 8.0 Protein TRACE (A) NEG mg/dL Glucose Negative NEG mg/dL Ketone Negative NEG mg/dL Bilirubin Negative NEG Blood Negative NEG Urobilinogen 0.2 0.2 - 1.0 EU/dL Nitrites Negative NEG Leukocyte Esterase Negative NEG    
 UA:UC IF INDICATED CULTURE NOT INDICATED BY UA RESULT CNI    
 WBC 0-4 0 - 4 /hpf  
 RBC 0-5 0 - 5 /hpf Epithelial cells FEW FEW /lpf Bacteria Negative NEG /hpf Hyaline cast 2-5 0 - 5 /lpf  
GLUCOSE, POC Collection Time: 04/18/21 11:14 AM  
Result Value Ref Range Glucose (POC) 190 (H) 65 - 100 mg/dL Performed by [facility] POC EG7 Collection Time: 04/18/21  4:08 PM  
Result Value Ref Range Calcium, ionized (POC) 1.22 1.12 - 1.32 mmol/L  
 pH (POC) 7.42 7.35 - 7.45    
 pCO2 (POC) 48.9 (H) 35.0 - 45.0 MMHG  
 pO2 (POC) 63 (L) 80 - 100 MMHG  
 HCO3 (POC) 31.5 (H) 22 - 26 MMOL/L  Base excess (POC) 7 mmol/L sO2 (POC) 92 92 - 97 % Site RIGHT RADIAL Device: NASAL CANNULA Flow rate (POC) 1.5 L/M Allens test (POC) YES Specimen type (POC) ARTERIAL    
GLUCOSE, POC Collection Time: 04/18/21  4:46 PM  
Result Value Ref Range Glucose (POC) 122 (H) 65 - 100 mg/dL Performed by Venkatesh Renee (CON) SODIUM, UR, RANDOM Collection Time: 04/18/21  5:08 PM  
Result Value Ref Range Sodium,urine random 41 MMOL/L  
MICROALBUMIN, UR, RAND W/ MICROALB/CREAT RATIO Collection Time: 04/18/21  5:08 PM  
Result Value Ref Range Microalbumin,urine random 16.50 MG/DL Creatinine, urine 90.10 mg/dL Microalbumin/Creat ratio (mg/g creat) 183 (H) 0 - 30 mg/g URINALYSIS W/MICROSCOPIC Collection Time: 04/18/21  5:08 PM  
Result Value Ref Range Color YELLOW/STRAW Appearance CLEAR CLEAR Specific gravity 1.016 1.003 - 1.030    
 pH (UA) 6.5 5.0 - 8.0 Protein 30 (A) NEG mg/dL Glucose Negative NEG mg/dL Ketone Negative NEG mg/dL Bilirubin Negative NEG Blood Negative NEG Urobilinogen 0.2 0.2 - 1.0 EU/dL Nitrites Negative NEG Leukocyte Esterase SMALL (A) NEG    
 WBC 0-4 0 - 4 /hpf  
 RBC 0-5 0 - 5 /hpf Epithelial cells FEW FEW /lpf Bacteria Negative NEG /hpf CHLORIDE, URINE RANDOM Collection Time: 04/18/21  5:09 PM  
Result Value Ref Range Chloride,urine random 44 MMOL/L  
URINE CULTURE HOLD SAMPLE Collection Time: 04/18/21  5:09 PM  
 Specimen: Serum Result Value Ref Range Urine culture hold Urine on hold in Microbiology dept for 2 days. If unpreserved urine is submitted, it cannot be used for addtional testing after 24 hours, recollection will be required. GLUCOSE, POC Collection Time: 04/18/21  9:27 PM  
Result Value Ref Range Glucose (POC) 156 (H) 65 - 100 mg/dL Performed by Tennille SALAZAR Collection Time: 04/19/21  4:28 AM  
Result Value Ref Range   81 - 246 U/L  
PROTHROMBIN TIME + INR  
 Collection Time: 04/19/21  4:28 AM  
Result Value Ref Range INR 1.8 (H) 0.9 - 1.1 Prothrombin time 18.0 (H) 9.0 - 11.1 sec PROCALCITONIN Collection Time: 04/19/21  4:28 AM  
Result Value Ref Range Procalcitonin 0.24 ng/mL NT-PRO BNP Collection Time: 04/19/21  4:28 AM  
Result Value Ref Range NT pro-BNP 9,464 (H) <125 PG/ML  
LACTIC ACID Collection Time: 04/19/21  4:28 AM  
Result Value Ref Range Lactic acid 0.4 0.4 - 2.0 MMOL/L  
MAGNESIUM Collection Time: 04/19/21  4:28 AM  
Result Value Ref Range Magnesium 2.4 1.6 - 2.4 mg/dL METABOLIC PANEL, COMPREHENSIVE Collection Time: 04/19/21  4:28 AM  
Result Value Ref Range Sodium 137 136 - 145 mmol/L Potassium 4.0 3.5 - 5.1 mmol/L Chloride 101 97 - 108 mmol/L  
 CO2 30 21 - 32 mmol/L Anion gap 6 5 - 15 mmol/L Glucose 125 (H) 65 - 100 mg/dL BUN 43 (H) 6 - 20 MG/DL Creatinine 3.02 (H) 0.55 - 1.02 MG/DL  
 BUN/Creatinine ratio 14 12 - 20 GFR est AA 19 (L) >60 ml/min/1.73m2 GFR est non-AA 15 (L) >60 ml/min/1.73m2 Calcium 8.9 8.5 - 10.1 MG/DL Bilirubin, total 0.5 0.2 - 1.0 MG/DL  
 ALT (SGPT) 6 (L) 12 - 78 U/L  
 AST (SGOT) 13 (L) 15 - 37 U/L Alk. phosphatase 54 45 - 117 U/L Protein, total 7.2 6.4 - 8.2 g/dL Albumin 3.6 3.5 - 5.0 g/dL Globulin 3.6 2.0 - 4.0 g/dL A-G Ratio 1.0 (L) 1.1 - 2.2    
CBC W/O DIFF Collection Time: 04/19/21  4:32 AM  
Result Value Ref Range WBC 3.0 (L) 3.6 - 11.0 K/uL  
 RBC 2.89 (L) 3.80 - 5.20 M/uL HGB 7.6 (L) 11.5 - 16.0 g/dL HCT 25.5 (L) 35.0 - 47.0 % MCV 88.2 80.0 - 99.0 FL  
 MCH 26.3 26.0 - 34.0 PG  
 MCHC 29.8 (L) 30.0 - 36.5 g/dL  
 RDW 17.3 (H) 11.5 - 14.5 % PLATELET 719 (L) 781 - 400 K/uL MPV 9.4 8.9 - 12.9 FL  
 NRBC 0.0 0  WBC ABSOLUTE NRBC 0.00 0.00 - 0.01 K/uL GLUCOSE, POC Collection Time: 04/19/21  7:14 AM  
Result Value Ref Range Glucose (POC) 139 (H) 65 - 100 mg/dL Performed by Valerie Dior I have reviewed the flowsheets. Chart and Pertinent Notes have been reviewed. No change in PMH ,family and social history from Consult note.  
 
 
Megha Mobley MD

## 2021-04-19 NOTE — PROGRESS NOTES
Interventional Cardiology Progress Note Name: Brandan Lerner : 1950 MRN: [id number] Date: 2021 Arya Stiles a 78 y. o. female admitted on 10/27/2020  for non-ischemic cardiomyopathy,  acute on chronic systolic CHF (congestive heart failure) ([facility] [address] [I50.23].   Has past medical history of chronic systolic CHF status post AICD, cardiogenic shock, s/p impella bridge to LVAD, post LVAD implantation (4/15/2017 by Dr. Solorzano Foil failure, CAD (left heart catheterization 2016 with high-grade ramus and small PDA disease borderline disease of LAD and takeoff of pRCA), A. fib, pulmonary hypertension, JED on CPAP, diabetes type 2, HLD, severe COPD, CKD, morbid obesity, hypertension, breast cancer status post bilateral mastectomy/chemo, and endometrial cancer status post hysterectomy, lymphedema left lower extremity, severe COPD.   
 
She is admitted with acute on chronic systolic heart failure with worsening renal dysfunction. Right heart catheterization requested to assess right sided pressures and cardiac output. The risks, benefits, and alternatives to right heart catheterization were discussed with the patient. She understands and wishes to proceed. All questions answered. Will place on cath schedule for 2021.  
 
Arnoldo Ibrahim MD 
21 
3:59 PM

## 2021-04-19 NOTE — PROGRESS NOTES
[facility] Hospitalist Progress Note Berna Aceves MD 
Answering service: [phone] OR [phone] from in house phone Date of Service:  2021 NAME:  Patrick Gallagher :  1950 MRN:  [id number] Please note that this dictation was completed with [employer], the computer voice recognition software. Quite often unanticipated grammatical, syntax, homophones, and other interpretive errors are inadvertently transcribed by the computer software. Please disregard these errors. Please excuse any errors that have escaped final proofreading. Admission Summary:  
Patrick Gallagher is a 78 y.o. female with a past medical history of heart failure s/p LVAD, COPD, CKD stage 3, pulmonary HTN, Depression, HTN, and HLD who presented to F appointment with complaints of chest tightness and increasing dyspnea. She was instructed to come to [facility] for admission for further treatment. At time of examination patient experiencing dyspnea but states she's feeling 100% better than what she felt yesterday. Hospitalist was consulted for admission.  
  
 
Interval history / Subjective:  
  Patient seen and examined Clinically fluid overload NICOM and or RHC will be helpful Diuretics per nephrology Assessment & Plan: Dyspnea due to acute on Chronic systolic congestive heart failure , s/p LVAD 
- NYHA class IV  
- s/p LVAD heartMate 2 , increased speed per HF team  
- Advanced heart failure team following  
- Continue Bumex, was on albumin , now both bumex and albumin is stopped  
bumex restarted on  
- Cardiac diet  
tafamidis 61 mg PO daily Hydralazine 100 mg po TID and ranexa RHC ? 
  
COPD 
- PRN nebs CKD stage 3 Avoid nephrotoxic agents and worsening kidney finction  
nephro has been consulted  
  
DM  
- A1c 7.2  
- Monitor BG AC/HS  
- Continue lantus 20 units   
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  
  
JED - Continue home trilogy 
  
Morbid Obesity - BMI 42.29 
- Weight loss management to be followed up outpatient Depression Paroxetine H/o breast cancer (1992) · s/p bilateral mastectomy/chemo and endometrial cancer s/p hysterectomy Atrial fibrillation Warfarin New onset pancytopenia Etiology unknown at this point Keflex induced ? ? Code status: full DVT prophylaxis:  
 
Care Plan discussed with: Patient/Family Anticipated Disposition: Home w/Family Anticipated Discharge: 24 hours to 48 hours Hospital Problems  Date Reviewed: 3/16/2021 Codes Class Noted POA Dyspnea ICD-10-CM: R06.00 
ICD-9-CM: 786.09  4/16/2021 Unknown Review of Systems: A comprehensive review of systems was negative except for that written in the HPI. Vital Signs:  
 Last 24hrs VS reviewed since prior progress note. Most recent are: 
Visit Vitals BP (!) 80/0 Pulse 83 Temp 97.7 °F (36.5 °C) Resp 22 Ht 5' 7\" (1.702 m) Wt 125 kg (275 lb 9.2 oz) SpO2 96% BMI 43.16 kg/m² Intake/Output Summary (Last 24 hours) at 4/19/2021 1526 Last data filed at 4/19/2021 1138 Gross per 24 hour Intake 840 ml Output 2025 ml Net -1185 ml Physical Examination:  
 
I had a face to face encounter with this patient and independently examined them on 04/19/21 as outlined below: 
     
Constitutional: Acute resp distress ENT:  Oral mucosa moist, oropharynx benign. Resp: Accessory muscle use , tachypnic CV:  Regular rhythm, normal rate, no murmurs, gallops, rubs, lvad hum GI:  Soft, non distended, non tender. normoactive bowel sounds, no hepatosplenomegaly Musculoskeletal:  No edema, warm, 2+ pulses throughout Neurologic:  Moves all extremities. AAOx3, CN II-XII reviewed Data Review:  
 Review and/or order of clinical lab test 
 
 
Labs:  
 
Recent Labs 04/19/21 
8290 04/18/21 
1869 WBC 3.0* 3.2* HGB 7.6* 7.8* HCT 25.5* 26.8*  
* 129* Recent Labs 04/19/21 
4664 04/18/21 
4197 04/17/21 
4441  136 139  
K 4.0 4.7 4.6  100 102 CO2 30 29 30 BUN 43* 44* 42* CREA 3.02* 3.13* 2.64* * 147* 130* CA 8.9 9.2 8.7 MG 2.4 2.4 2.3 URICA  --   --  10.9* Recent Labs 04/19/21 
1864 04/18/21 
9836 04/17/21 
0523 ALT 6* 8* 7* AP 54 56 52 TBILI 0.5 0.6 0.6 TP 7.2 7.9 7.4 ALB 3.6 3.6 3.6 GLOB 3.6 4.3* 3.8 Recent Labs 04/19/21 
4043 04/18/21 
8738 04/17/21 
3564 INR 1.8* 1.8* 1.8* PTP 18.0* 18.7* 17.9* Recent Labs 04/17/21 
0454 TIBC 252 PSAT 9*  
FERR 78 Lab Results Component Value Date/Time Folate 10.0 10/28/2020 03:56 AM  
  
No results for input(s): PH, PCO2, PO2 in the last 72 hours. No results for input(s): CPK, CKNDX, TROIQ in the last 72 hours. No lab exists for component: CPKMB Lab Results Component Value Date/Time Cholesterol, total 129 04/16/2021 10:40 AM  
 HDL Cholesterol 33 04/16/2021 10:40 AM  
 LDL, calculated 74 04/16/2021 10:40 AM  
 Triglyceride 110 04/16/2021 10:40 AM  
 CHOL/HDL Ratio 3.9 04/16/2021 10:40 AM  
 
Lab Results Component Value Date/Time Glucose (POC) 184 (H) 04/19/2021 11:08 AM  
 Glucose (POC) 139 (H) 04/19/2021 07:14 AM  
 Glucose (POC) 156 (H) 04/18/2021 09:27 PM  
 Glucose (POC) 122 (H) 04/18/2021 04:46 PM  
 Glucose (POC) 190 (H) 04/18/2021 11:14 AM  
 
Lab Results Component Value Date/Time  Color YELLOW/STRAW 04/18/2021 05:08 PM  
 Appearance CLEAR 04/18/2021 05:08 PM  
 Specific gravity 1.016 04/18/2021 05:08 PM  
 Specific gravity 1.013 11/09/2020 12:05 AM  
 pH (UA) 6.5 04/18/2021 05:08 PM  
 Protein 30 (A) 04/18/2021 05:08 PM  
 Glucose Negative 04/18/2021 05:08 PM  
 Ketone Negative 04/18/2021 05:08 PM  
 Bilirubin Negative 04/18/2021 05:08 PM  
 Urobilinogen 0.2 04/18/2021 05:08 PM  
 Nitrites Negative 04/18/2021 05:08 PM  
 Leukocyte Esterase SMALL (A) 04/18/2021 05:08 PM  
 Epithelial cells FEW 04/18/2021 05:08 PM  
 Bacteria Negative 04/18/2021 05:08 PM  
 WBC 0-4 04/18/2021 05:08 PM  
 RBC 0-5 04/18/2021 05:08 PM  
 
 
 
Medications Reviewed:  
 
Current Facility-Administered Medications Medication Dose Route Frequency  bumetanide (BUMEX) injection 2 mg  2 mg IntraVENous BID  [START ON 4/20/2021] levothyroxine (SYNTHROID) tablet 25 mcg  25 mcg Oral 6am  
 isosorbide mononitrate ER (IMDUR) tablet 30 mg  30 mg Oral DAILY  warfarin (COUMADIN) tablet 4 mg  4 mg Oral ONCE  
 cephALEXin (KEFLEX) capsule 250 mg  250 mg Oral BID  sodium chloride (NS) flush 5-40 mL  5-40 mL IntraVENous Q8H  
 sodium chloride (NS) flush 5-40 mL  5-40 mL IntraVENous PRN  
 acetaminophen (TYLENOL) tablet 650 mg  650 mg Oral Q4H PRN  
 hydrALAZINE (APRESOLINE) 20 mg/mL injection 10 mg  10 mg IntraVENous Q4H PRN  
 albuterol (PROVENTIL VENTOLIN) nebulizer solution 2.5 mg  2.5 mg Nebulization Q4H PRN  
 arformoterol 15 mcg/budesonide 0.5 mg neb solution   Nebulization BID RT  
 hydrALAZINE (APRESOLINE) tablet 100 mg  100 mg Oral TID  hydrOXYzine HCL (ATARAX) tablet 10 mg  10 mg Oral TID PRN  
 insulin glargine (LANTUS) injection 20 Units  20 Units SubCUTAneous BID  PARoxetine (PAXIL) tablet 20 mg  20 mg Oral DAILY  ranolazine ER (RANEXA) tablet 1,000 mg  1,000 mg Oral BID  tafamidis cap 61 mg (Patient Supplied)  61 mg Oral DAILY  warfarin dosing per pharmacy   Other Rx Dosing/Monitoring  miconazole (MICOTIN) 2 % powder   Topical BID  
 
______________________________________________________________________ EXPECTED LENGTH OF STAY: 4d 0h 
ACTUAL LENGTH OF STAY:          3 Kelsie Ga MD

## 2021-04-19 NOTE — PROGRESS NOTES
NUTRITION brief Diet: cardiac Supplements: none Meal Intake:  
Patient Vitals for the past 168 hrs: 
 % Diet Eaten 04/19/21 1006 51 - 75% 04/18/21 1618 76 - 100% MST for pressure injury received. Pt unavailable x 2 attempts today. Will revisit tomorrow for interview and full assessment. Pt known from past admits with limited interested in oral nutrition supplements but will trial Magic Cup BID (580kcal, 18g protein) for tonight to see if pt accepting. Per wound care notes: 1. POA Right anterior lower leg wound Etiology: Venous Full thickness 2. POA Right medial lower leg wound Etiology: Trauma, Pt hit leg on  door Full thickness 3. POA Left anterior lower leg wound Etiology: Venous Full thickness 4. POA Stage 3 pressure injury to right buttock Etiology: Pressure  
  
Raven Man, RD [address] [place] , Pager #[phone] or via [employer]

## 2021-04-19 NOTE — PROGRESS NOTES
ID Progress Note 2021 Subjective: No new complaints Objective: Antibiotics: 1. Cephalexin Vitals:  
Visit Vitals BP (!) 80/0 Pulse 83 Temp 97.7 °F (36.5 °C) Resp 22 Ht 5' 7\" (1.702 m) Wt 125 kg (275 lb 9.2 oz) SpO2 96% BMI 43.16 kg/m² Tmax:  Temp (24hrs), Av.9 °F (36.6 °C), Min:97.7 °F (36.5 °C), Max:98.1 °F (36.7 °C) Exam:  Lungs:  clear to auscultation bilaterally Heart:  regular rate and rhythm Abdomen:  soft, non-tender. Bowel sounds normal. No masses,  no organomegaly Labs:     
Recent Labs 21 
3079 21 
1798 21 
8041 21 
9520 21 
2602 21 
8283 WBC 3.0*  --  3.2*  --  2.7*  --   
HGB 7.6*  --  7.8*  --  7.7*  --   
*  --  129*  --  128*  --   
BUN  --  43*  --  44*  --  42* CREA  --  3.02*  --  3.13*  --  2.64* AP  --  54  --  56  --  52  
TBILI  --  0.5  --  0.6  --  0.6 Cultures: No results found for: [facility] Lab Results Component Value Date/Time Culture result: NO GROWTH 5 DAYS 11/10/2020 06:42 PM  
 Culture result: No Growth (<1000 cfu/mL) 2020 12:05 AM  
 Culture result: SCANT Morganella morganii ssp morganii (A) 10/30/2020 11:30 AM  
 Culture result: SCANT Morganella morganii ssp morganii (A) 10/30/2020 11:30 AM  
 Culture result: RARE DIPHTHEROIDS (A) 10/30/2020 11:30 AM  
 
 
Radiology:  
 
Line/Insert Date:        
 
Assessment: 1. Chronic LVAD infection 2. Cytopenias 3. Chronic antibiotic suppressive therapy 4. Fluid overload Objective: 1. Continue current therapy 2. Work up any new fevers Joelle Garnett MD

## 2021-04-19 NOTE — NURSE NAVIGATOR
Chart reviewed by Heart Failure Nurse Navigator. Heart Failure database completed. EF:  Prior ef 20/25; repeat echo pending ACEi/ARB/ARNi: held d/t aTTR BB: held d/t aTTR Aldosterone Antagonist: held d/t aTTR Obstructive Sleep Apnea Screening: Yes, on CPAP 
 STOP-BANG score: 
 Referred to Sleep Medicine: CRT: 
 
NYHA Functional Class  IIIA/IV. Heart Failure Teach Back in Patient Education. Heart Failure Avoiding Triggers on Discharge Instructions. Cardiologist: Dr. Ed Muñoz [facility]) Post discharge follow up phone call to be made within 48-72 hours of discharge.

## 2021-04-19 NOTE — PROGRESS NOTES
Problem: Mobility Impaired (Adult and Pediatric) Goal: *Acute Goals and Plan of Care (Insert Text) Description: FUNCTIONAL STATUS PRIOR TO ADMISSION: Patient was modified independent using a rollator for functional mobility. HOME SUPPORT PRIOR TO ADMISSION: The patient lived alone with daughter to provide assistance. Will be staying with daughter once discharged. Physical Therapy Goals Initiated 4/17/2021 1. Patient will move from supine to sit and sit to supine  and scoot up and down in bed with modified independence within 7 day(s). 2.  Patient will transfer from bed to chair and chair to bed with modified independence using the least restrictive device within 7 day(s). 3.  Patient will perform sit to stand with modified independence within 7 day(s). 4.  Patient will ambulate with modified independence for 150 feet with the least restrictive device within 7 day(s). Outcome: Progressing Towards Goal 
 
PHYSICAL THERAPY TREATMENT Patient: Ulisses Johnson (85 y.o. female) Date: 4/19/2021 Diagnosis: Dyspnea [R06.00] <principal problem not specified> Precautions: (LVAD) Chart, physical therapy assessment, plan of care and goals were reviewed. ASSESSMENT Patient continues with skilled PT services and is progressing towards goals. Pt in chair on arrival. Pt needing to return to bed for ECHO. Pt had decrease LE stability with upright mobility. Pt SOB/ fatigue with task. Will continue to progress mobility as able. .  
 
Current Level of Function Impacting Discharge (mobility/balance): CGA Other factors to consider for discharge: decrease LE stability, decrease mobility and activity tolerance PLAN : 
Patient continues to benefit from skilled intervention to address the above impairments. Continue treatment per established plan of care. to address goals. Recommendation for discharge: (in order for the patient to meet his/her long term goals) Physical therapy at least 2 days/week in the home AND ensure assist and/or supervision for safety with mobility This discharge recommendation: 
Has not yet been discussed the attending provider and/or case management IF patient discharges home will need the following DME: patient owns DME required for discharge SUBJECTIVE:  
Patient stated I am ready.  OBJECTIVE DATA SUMMARY:  
Critical Behavior: 
Neurologic State: Alert, Appropriate for age Orientation Level: Oriented X4 Cognition: Appropriate decision making, Appropriate for age attention/concentration, Appropriate safety awareness, Follows commands, Recognition of people/places Safety/Judgement: Awareness of environment Functional Mobility Training: 
Bed Mobility: 
  
Supine to Sit: Moderate assistance;Assist x1 Sit to Supine: Moderate assistance Transfers: 
Sit to Stand: Contact guard assistance Stand to Sit: Contact guard assistance Bed to Chair: Contact guard assistance(decrease LE stability) Balance: 
Sitting: Intact Standing: Impaired Standing - Static: Good Standing - Dynamic : Fair Ambulation/Gait Training: 
  
  
  
  
  
  
  
  
  
  
  
  
  
  
  
  
 Stairs: Therapeutic Exercises:  
 
Pain Rating: 
 
 
Activity Tolerance:  
Poor and observed SOB with activity After treatment patient left in no apparent distress:  
Supine in bed and Call bell within reach COMMUNICATION/COLLABORATION:  
The patients plan of care was discussed with: Registered nurse. Rufus Lay PTA Time Calculation: 18 mins

## 2021-04-19 NOTE — PROGRESS NOTES
Pharmacist Note  Warfarin Dosing Consult provided for this 78 y. o.female to manage warfarin for LVAD INR Goal: 1.8-2.5 per NP (lowered due to GIB) Home regimen/ tablet size: 4 mg daily per most recent prescription Drugs that may increase INR: None Drugs that may decrease INR: None Other current anticoagulants/ drugs that may increase bleeding risk: None Risk factors: Age > 72 Daily INR ordered: YES Recent Labs 04/19/21 
0752 04/19/21 
5814 04/18/21 
9098 04/18/21 
3790 04/17/21 
5286 04/17/21 
7790 HGB 7.6*  --  7.8*  --  7.7*  --   
INR  --  1.8*  --  1.8*  --  1.8* Date               INR                  Dose 4/16  1.6  4 mg 4/17  1.8  4 mg 4/18  1.8  4 mg 4/19  1.8  4 mg Assessment/ Plan: Will order warfarin 4 mg PO x 1 dose. Pharmacy will continue to monitor daily and adjust therapy as indicated. Please contact the pharmacist at  for outpatient recommendations if needed.

## 2021-04-19 NOTE — PROGRESS NOTES
Bedside and Verbal shift change report given to Launa Kawasaki (oncoming nurse) by Esvin Pham (offgoing nurse). Report included the following information SBAR, Kardex, Intake/Output, MAR, Recent Results and Cardiac Rhythm Paced.

## 2021-04-20 NOTE — PROGRESS NOTES
Physical Therapy Reviewed chart. Noted pt is currently off the floor for RHC. Will defer at this time and continue to follow.

## 2021-04-20 NOTE — PROGRESS NOTES
0730: Bedside shift change report given to Mello Pacheco (oncoming nurse) by Soraida Andrews (offgoing nurse). Report included the following information SBAR, Kardex, Intake/Output, MAR and Recent Results. 0745: Pt off unit to Cath Lab with RN. 
 
8411: TRANSFER - IN REPORT: 
 
Verbal report received from [place] [id number] (name) on Manuel Cartwright  being received from Cath Lab (unit) for routine post - op Report consisted of patients Situation, Background, Assessment and  
Recommendations(SBAR). Information from the following report(s) Procedure Summary was reviewed with the receiving nurse. Opportunity for questions and clarification was provided. Assessment completed upon patients arrival to unit and care assumed. 1021: Pt returned to unit and placed on tele. VS and assessment obtained. VS stable. 1930: Bedside shift change report given to Nely (oncoming nurse) by Mello Pacheco (offgoing nurse). Report included the following information SBAR, Kardex, Procedure Summary, Intake/Output, MAR and Recent Results.

## 2021-04-20 NOTE — PROGRESS NOTES
Comprehensive Nutrition Assessment Type and Reason for Visit: Initial, Positive nutrition screen, Wound Nutrition Recommendations/Plan: 1. Continue to encourage high protein foods with all meals 2. Add daily MVI for wound healing Nutrition Assessment:   
Pt admitted for dypsnea. PMHx: heart failure s/p LVAD, COPD, CKD stage 3, pulmonary HTN, Depression, HTN, and HLD Admitted [facility] Heart Failure clinic for SOB and chest pain. Cardiac cath completed today with Heart Failure Team following. Diuretics in place with LE edema present. WOCN following for multiple wound POA. Per wound care notes: 1. POA Right anterior lower leg wound  
Etiology: Venous Full thickness  
2. POA Right medial lower leg wound Etiology: Trauma, Pt hit leg on  door Full thickness 3. POA Left anterior lower leg wound  
Etiology: Venous Full thickness  
4. POA Stage 3 pressure injury to right buttock  
Etiology: Pressure  
 
Pt visited today. Know from previous admits with poor appetite at that time. Mood much improved from last admit with paxil rx, much more postivie outlook today. She reports eating well with meals and enjoying the food more. Reinforce protein for skin health and meal order taken. Will continue Magic Cup BID (580kcal, 18g protein) for now for added protein. Diet order adjusted slightly for more food options. Malnutrition Assessment: 
Malnutrition Status:  No malnutrition Nutritionally Significant Medications: lantus (20 units), synthroid, paxil, ranexa, coumadin, bumex drip. Estimated Daily Nutrient Needs: 
Energy (kcal): 9869-8139IIIS; Weight Used for Energy Requirements: Current(118kg) Protein (g): 105-130g (20-25% est needs); Weight Used for Protein Requirements: Current(118kg) Fluid (ml/day): 2100 or per cardio; Method Used for Fluid Requirements: 1 ml/kcal 
 
Nutrition Related Findings:      
BM: 4/18 Edema: 2+ BLLE Wounds:  (See WOCN note) Current Nutrition Therapies: Diet: consistent CHO Supplements: [employer] (added by RD yesterday) Additional Caloric Sources: none Meal intake:  
Patient Vitals for the past 168 hrs: 
 % Diet Eaten 04/20/21 1341 51 - 75% 04/19/21 1006 51 - 75% 04/18/21 1618 76 - 100% Anthropometric Measures: 
· Height:  5' 7\" (170.2 cm) · Current Body Wt:  118 kg (260 lb 2.3 oz) · Admission Body Wt:      
· Usual Body Wt:       
· Ideal Body Wt:  135:  192.7 % Wt Readings from Last 10 Encounters:  
04/20/21 118.8 kg (261 lb 14.5 oz) 04/15/21 122.9 kg (271 lb) 04/15/21 122.9 kg (271 lb)  
03/16/21 124.7 kg (275 lb)  
03/15/21 124.7 kg (275 lb) 02/09/21 127.9 kg (282 lb) 01/07/21 127.9 kg (282 lb) 12/08/20 128.8 kg (284 lb)  
11/20/20 128 kg (282 lb 3 oz) 11/08/20 90.7 kg (200 lb) Nutrition Diagnosis:  
· Increased nutrient needs related to (wound healing) as evidenced by wounds Nutrition Interventions:  
Food and/or Nutrient Delivery: Modify current diet, Continue oral nutrition supplement Nutrition Education and Counseling: Education initiated Coordination of Nutrition Care: Continue to monitor while inpatient Goals: 
Continued consumption of at leats 60% meals and 1-2 supplements/day; high protein food with each meal    
 
Nutrition Monitoring and Evaluation:  
Behavioral-Environmental Outcomes: Beliefs and attitudes Food/Nutrient Intake Outcomes: Food and nutrient intake, Supplement intake Physical Signs/Symptoms Outcomes: Biochemical data, Weight, Skin, Fluid status or edema Discharge Planning: Too soon to determine Samantha Bronson RD  [address] [place] , Pager #[phone] or via [employer]

## 2021-04-20 NOTE — PROGRESS NOTES
Cardiac Cath Lab Recovery Arrival Note: 
 
 
Vivien Arriaza arrived to Cardiac Cath Lab, Recovery Area. Staff introduced to patient. Patient identifiers verified with NAME and DATE OF BIRTH. Procedure verified with patient. Consent forms reviewed and signed by patient or authorized representative and verified. Allergies verified. Patient informed of procedure and plan of care. Questions answered with review. Patient prepped for procedure, per orders from physician, prior to arrival. 
 
Patient on cardiac monitor, non-invasive blood pressure, SPO2 monitor. Patient is A&Ox 4. Patient reports issues breathing when laying flat. Patient in stretcher, in low position, with side rails up, call bell within reach, patient instructed to call of assistance as needed. Patient prep in: [facility] Recovery Area, Bed# 4. Family in: upstairs in the patient's room 467. Prep by: LUNA Julien

## 2021-04-20 NOTE — PROGRESS NOTES
Pharmacist Note  Warfarin Dosing Consult provided for this 78 y. o.female to manage warfarin for LVAD INR Goal: 1.8-2.5 per NP (lowered due to GIB) Home regimen/ tablet size: 4 mg daily per most recent prescription Drugs that may increase INR: None Drugs that may decrease INR: None Other current anticoagulants/ drugs that may increase bleeding risk: None Risk factors: Age > 72 Daily INR ordered: YES Recent Labs  
  04/20/21 
0431 04/19/21 
4729 04/19/21 
0428 04/18/21 
0547 04/18/21 
6867 HGB 7.5* 7.6*  --  7.8*  --   
INR 2.0*  --  1.8*  --  1.8* Date               INR                  Dose 4/16  1.6  4 mg 4/17  1.8  4 mg 4/18  1.8  4 mg 4/19  1.8  4 mg  
4/20                 2.0                  4 mg Assessment/ Plan: Will order warfarin 4 mg PO x 1 dose. Pharmacy will continue to monitor daily and adjust therapy as indicated. Please contact the pharmacist at  for outpatient recommendations if needed.

## 2021-04-20 NOTE — PROGRESS NOTES
[address] [facility] [address] [facility] [place] in [facility] Procedure: Right Heart Cath  
  
Pre Procedure: 9499 Speed: 9600 Flow: 5.6 MAP: 98 
PI: 5.2 Power: 6.3 Fixed Speed: 9600 Low Speed Limit: 9200 Noted emergency bag with back up equipment not with patient. Contacted floor to request bag be brought to cath lab. Bag brought by KASSIDY Tovar RN LVAD coordinator. Post Procedure 0641 Speed: 9600 Flow: 5.4 MAP: 96 
PI: 5.5 Power: 6.2 VAD Coordinator managed LVAD equipment during procedure.

## 2021-04-20 NOTE — PROGRESS NOTES
Cardiac Cath Lab Procedure Area Arrival Note: 
 
Celestina Chester arrived to Cardiac Cath Lab, Procedure Area. Patient identifiers verified with NAME and DATE OF BIRTH. Procedure verified with patient. Consent forms verified. Allergies verified. Patient informed of procedure and plan of care. Questions answered with review. Patient voiced understanding of procedure and plan of care. Patient on cardiac monitor, non-invasive blood pressure, SPO2 monitor. On Oxygen 2 lpm via NC.  IV of NS on pump at Nellie Lathe ml/hr. Pt with LVAD- Heartmate 2.  5.5LPM, 9600RPM. Patient status doing well without problems. Patient is A&Ox 4. Patient reports no chest pain or dyspnea. Patient medicated during procedure with orders obtained and verified by Dr. Bonny Godfrey. Refer to patients Cardiac Cath Lab PROCEDURE REPORT for vital signs, assessment, status, and response during procedure, printed at end of case. Printed report on chart or scanned into chart. 09:20- Transfer to [facility] from Procedure Area Verbal report given to Italo gotti RN on Celestina Chester being transferred to Cardiac Cath Lab  for routine progression of care   Patient is post RHC procedure. Patient stable upon transfer to . Report consisted of patients Situation, Background, Assessment and  
Recommendations(SBAR). Information from the following report(s) SBAR and Procedure Summary was reviewed with the receiving nurse. Opportunity for questions and clarification was provided. Patient medicated during procedure with orders obtained and verified by Dr. Bonny Godfrey. Refer to patient PROCEDURE REPORT for vital signs, assessment, status, and response during procedure.

## 2021-04-20 NOTE — PROGRESS NOTES
0730: Bedside and Verbal shift change report given to Sal Iglesias RN and Layo Stallings (oncoming nurse) by [facility] (offgoing nurse). Report included the following information SBAR, Kardex, Intake/Output, MAR, Accordion and Recent Results. 0745: Patient off unit to cath lab 
 
1930: Bedside and Verbal shift change report given to Una Ojeda RN (oncoming nurse) by Sal Iglesias RN and PADILLA Colvin (offgoing nurse). Report included the following information SBAR, Kardex, Intake/Output, MAR, Accordion, Recent Results and Cardiac Rhythm paced. Preceptor Review of RN Work 4/20/2021  - Shift times - 0730 to 1930 The RN documentation of patient care for Manuel Cartwright by Sal Iglesias RN has been reviewed and approved. All medications have been administered under the direct supervision of the preceptor.  
 
Linda Desai RN

## 2021-04-20 NOTE — PROGRESS NOTES
TRANSFER - OUT REPORT: 
 
Verbal report given to PADILLA Blackmon(name) on Brandan Lerner  being transferred to CVSU(unit) for routine progression of care Report consisted of patients Situation, Background, Assessment and  
Recommendations(SBAR). Information from the following report(s) Procedure Summary, Intake/Output, MAR and Recent Results was reviewed with the receiving nurse. Lines:  
Quad Lumen RIJ 04/18/21 Right Internal jugular (Active) Central Line Being Utilized Yes 04/19/21 2010 Criteria for Appropriate Use Limited/no vessel suitable for conventional peripheral access 04/19/21 2010 Site Assessment Clean, dry, & intact 04/19/21 2010 Infiltration Assessment 0 04/19/21 2010 Affected Extremity/Extremities Color distal to insertion site pink (or appropriate for race); Pulses palpable 04/19/21 2010 Date of Last Dressing Change 04/18/21 04/19/21 2010 Dressing Status Clean, dry, & intact 04/19/21 2010 Dressing Type Disk with Chlorhexadine gluconate (CHG); Transparent 04/19/21 2010 Action Taken Open ports on tubing capped 04/19/21 2010 Proximal Hub Color/Line Status White;Capped 04/19/21 2010 Positive Blood Return (Medial Site) Yes 04/19/21 2010 Medial 1 Hub Color/Line Status Blue;Capped 04/19/21 2010 Positive Blood Return (Lateral Site) Yes 04/19/21 2010 Medial 2 Hub Color/Line Status Gray;Capped 04/19/21 2010 Positive Blood Return (Site #3) Yes 04/19/21 2010 Distal Hub Color/Line Status Brown;Capped 04/19/21 2010 Positive Blood Return (Site #4) Yes 04/19/21 2010 Alcohol Cap Used Yes 04/19/21 1006 Opportunity for questions and clarification was provided. Patient transported with: 
 Monitor Registered Nurse

## 2021-04-20 NOTE — PROGRESS NOTES
[address] [facility] [address] [facility] in [place], [place] Inpatient Consult Progress Note Patient name: Emiliano Guan Patient : 1950 Patient MRN: [id number] Consulting MD: Louie Hunter MD 
Date of service: 21 CHIEF COMPLAINT: 
Dyspnea 
  
PLAN: 
· RHC shows adequate Sal CI 3.0 but severely elevated RA pressure 24 mmHg · Nephrology consult for worsening renal function; will begin Bumex gtt · ID consult for pancytopenia and persistent sed rate · Hematology consult for pancytopenia Continue increased device speed to 9600rpm due to LVEDD up to 8.3cm TTE  shows improved LVIDd, 6.68 cm with RVIDd 5.14 cm and TAPSE 1.1 cm Previously was intolerant to higher speeds due to VT; observe closely Begin Bumex gtt 1 mg/hr due to severely elevated RA pressure Intolerant to BB/ACEi/ARB/ARNI due to aTTR; on hold for now Continue tafamidis 61 mg PO daily Hydralazine 100 mg po TID, MAP above goal 70-90 mmHg in absence of palpable radial pulse   
Continue Imdur 30 mg PO daily Intolerant of spironolactone due to hyperkalemia  
Coumadin dose per pharmacy; goal INR lowered to 1.8-2.5 due to GIB Continue ranolazine, no ectopy - followed by Dr. Sukumar Santoyo OP Antibiotics for chronic sternal wound infection per ID- continue Keflex po Check FOB due to anemia TSH 3.99; begin Synthroid 25 mcg qAM  
Use home Trilogy when napping and QHS PFTs when euvolemic  
SLP consult due to dysphagia appreciated Begin Miralax daily while aggressively diuresing WOCN consult for BLE wounds PT/OT eval 
US of L arm negative for DVT Palliative care consult appreciated; patient remains full code, AMD given to patient/daughter to complete DM management per Hospitalist  
Up to date on flu, PNA, and COVID vaccinations CM to assist with dispo planning; [facility] aide already [facility]  
  
IMPRESSION: 
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 Alma Mao) · C/b delayed extubation due to severe COPD 
· C/b critical illness polyneuropathy · C/b prolonged hospitalization post-LVAD, 55 days · C/b sternal wound infection s/p debridement (by Dr. Elvie Rios) s/p wound vac · C/b sacral ulcer · Would culture positive for Staph aureus, not MRSA · C/b LVAD site drainage, improved S/p CRT-ICD · ICD fired due to electrolyte imbalance (2011) H/o breast cancer (1992) · s/p bilateral mastectomy/chemo and endometrial cancer s/p hysterectomy · Lymphedema of LLE due to cancer treatment Severe COPD with FEV1 50% Depression Atrial fibrillation H/o \"two mini strokes\" S/p fall with hip facture · Right hip hemmiarthroplasty (5/23/18) by Dr. Kathe Amaya) · S/p removed hip hardwarare due to pain (4/15/19) COPD severe CKD, stage 3 Hyperkalemia Pulmonary hypertension Cardiac risk factors: · Morbid obesity, Body mass index is 42.29 kg/m². · DM2 insulin dependent · JED on Trilogy · HL Urinary incontinence, severe · no procedures due to anticoagulation Endometrial cancer (2002) HTN 
HL 
  
CARDIAC IMAGING: 
Echo (9/24/19) LVEF 20-25%, AV opens 1:1, no AR Echo (5/29/18) LVEF 10-%, ramps study done, report in epic Echo (1/9/18) ramp study done, LVEDD 7.1cm C (11/9/18) 2 vessel disease with 90% OM, 80% PDA, DSA to PDA branch 
  INTERVAL HISTORY: 
RHC shows elevated RA pressure 24, Sal CI 3.0 MAPs remain elevated Cr improved 2.87 from 3.03  
proBNP improved 8182 from 9464 INR 2.0 LVAD INTERROGATION: 
Device interrogated in person Device function normal, normal flow, no events LVAD Pump Speed (RPM): 7047 Pump Flow (LPM): 6.2 MAP: 84 
PI (Pulsitility Index): 4.7 Power: 6.6  Test: No 
Back Up  at Bedside & Labeled: Yes Power Module Test: No 
Driveline Site Care: No 
Driveline Dressing: Clean, Dry, and Intact Outpatient: No 
MAP in Therapeutic Range (Outpatient): Yes Testing Alarms Reviewed: Yes 
Back up SC speed: 9600 Back up Low Speed Limit: 9200 Emergency Equipment with Patient?: Yes Emergency procedures reviewed?: No 
Drive line site inspected?: Yes Drive line intergrity inspected?: Yes Drive line dressing changed?: Yes PHYSICAL EXAM: 
Visit Vitals BP (!) 80/0 Pulse 85 Temp 98.3 °F (36.8 °C) Resp 23 Ht 5' 7\" (1.702 m) Wt 261 lb 14.5 oz (118.8 kg) SpO2 98% BMI 41.02 kg/m² General: Patient is well developed, well-nourished in no acute distress HEENT: Normocephalic and atraumatic. No scleral icterus. Pupils are equal, round and reactive to light and accomodation. No conjunctival injection. Oropharynx is clear. Neck: Supple. No evidence of thyroid enlargements or lymphadenopathy. JVD: Cannot be appreciated Lungs: Breath sounds are equal and clear bilaterally. No wheezes, rhonchi, or rales. Heart: Regular rate and rhythm with normal S1 and S2. No murmurs, gallops or rubs. Abdomen: Soft, no mass or tenderness. No organomegaly or hernia. Bowel sounds present. Genitourinary and rectal: deferred Extremities: No cyanosis, clubbing, or edema. Neurologic: No focal sensory or motor deficits are noted. Grossly intact. Psychiatric: Awake, alert an doriented x 3. Appropriate mood and affect. Skin: Warm, dry and well perfused. No lesions, nodules or rashes are noted. REVIEW OF SYSTEMS: 
General: Denies fever, night sweats.  
Ear, nose and throat: Denies difficulty hearing, sinus problems, runny nose, post-nasal drip, ringing in ears, mouth sores, loose teeth, ear pain, nosebleeds, sore throate, facial pain or numbess Cardiovascular: see above in the interval history Respiratory: Denies cough, wheezing, sputum production, hemoptysis. Gastrointestinal: Denies heartburn, constipation, intolerance to certain foods, diarrhea, abdominal pain, nausea, vomiting, difficulty swallowing, blood in stool Kidney and bladder: Denies painful urination, frequent urination, urgency, prostate problems and impotence Musculoskeletal: Denies joint pain, muscle weakness Skin and hair: Denies change in existing skin lesions, hair loss or increase, breast changes PAST MEDICAL HISTORY: 
Past Medical History:  
Diagnosis Date  Asthma  Cancer ([facility] breast  
 [facility]   
 endometrial  
 Congestive heart failure, unspecified  CRI (chronic renal insufficiency)  Depression  Diabetes ([facility]  Hypertension PAST SURGICAL HISTORY: 
Past Surgical History:  
Procedure Laterality Date  HX HERNIA REPAIR    
 HX HYSTERECTOMY  HX MASTECTOMY FAMILY HISTORY: 
No family history on file. SOCIAL HISTORY: 
Social History Socioeconomic History  Marital status:  Spouse name: Not on file  Number of children: Not on file  Years of education: Not on file  Highest education level: Not on file Tobacco Use  Smoking status: Never Smoker  Smokeless tobacco: Never Used Substance and Sexual Activity  Alcohol use: Not Currently LABORATORY RESULTS: 
  
Labs Latest Ref Rng & Units 4/20/2021 4/20/2021 4/19/2021 4/18/2021 4/17/2021 4/5/2021 4/2/2021 WBC 3.6 - 11.0 K/uL - 2. 9(L) 3.0(L) 3. 2(L) 2. 7(L) - 4.9  
RBC 3.80 - 5.20 M/uL - 2.88(L) 2.89(L) 3.02(L) 2.94(L) - 3.45(L) Hemoglobin 11.5 - 16.0 g/dL - 7. 5(L) 7. 6(L) 7. 8(L) 7. 7(L) - 9. 0(L) Hematocrit 35.0 - 47.0 % - 26. 0(L) 25. 5(L) 26. 8(L) 26. 3(L) - 28. 7(L) MCV 80.0 - 99.0 FL - 90.3 88.2 88.7 89.5 - 83 Platelets 614 - 944 K/uL - 121(L) 122(L) 129(L) 128(L)  181 Lymphocytes 12 - 49 % - - - 13 - - - Monocytes 5 - 13 % - - - 8 - - - Eosinophils 0 - 7 % - - - 0 - - - Basophils 0 - 1 % - - - 0 - - - Albumin 3.5 - 5.0 g/dL 3.4(L) 3.5 3.6 3.6 3.6 4.0 4.2 Calcium 8.5 - 10.1 MG/DL 8.9 8.8 8.9 9.2 8.7 8.9 9.5 Glucose 65 - 100 mg/dL 104(H) 104(H) 125(H) 147(H) 130(H) 179(H) 155(H) BUN 6 - 20 MG/DL 44(H) 45(H) 43(H) 44(H) 42(H) 35(H) 43(H) Creatinine 0.55 - 1.02 MG/DL 2.87(H) 2.90(H) 3.02(H) 3.13(H) 2.64(H) 2.44(H) 2.89(H) Sodium 136 - 145 mmol/L 140 139 137 136 139 140 140 Potassium 3.5 - 5.1 mmol/L 4.2 4.2 4.0 4.7 4.6 4.9 4.9 TSH 0.36 - 3.74 uIU/mL - - - - 3.99(H) - -  
LDH 81 - 246 U/L - 172 169 173 179 - 208 Some recent data might be hidden Lab Results Component Value Date/Time TSH 3.99 (H) 04/17/2021 04:54 AM  
 TSH 2.980 10/01/2020 12:00 AM  
 
 
ALLERGY: 
Allergies Allergen Reactions  Benzodiazepines Other (comments) Respiratory issues CURRENT MEDICATIONS: 
 
Current Facility-Administered Medications:  
  warfarin (COUMADIN) tablet 4 mg, 4 mg, Oral, ONCE, Fabián Walter, NP 
  bumetanide (BUMEX) 0.25 mg/mL infusion, 1 mg/hr, IntraVENous, CONTINUOUS, Fabián Walter NP, Last Rate: 4 mL/hr at 04/20/21 1147, 1 mg/hr at 04/20/21 1147 
  [START ON 4/21/2021] therapeutic multivitamin (THERAGRAN) tablet 1 Tab, 1 Tab, Oral, DAILY, Polliard, Fabián Gails T, TEA 
  polyethylene glycol (MIRALAX) packet 17 g, 17 g, Oral, DAILY, Polliard, Gwen Bumpers, NP 
  levothyroxine (SYNTHROID) tablet 25 mcg, 25 mcg, Oral, 6am, Polliard, Gwen Bumpers, NP, 25 mcg at 04/20/21 2348   isosorbide mononitrate ER (IMDUR) tablet 30 mg, 30 mg, Oral, DAILY, Fabián Walter, NP, 30 mg at 04/20/21 1051   cephALEXin (KEFLEX) capsule 250 mg, 250 mg, Oral, BID, Duane Castaneda MD, 250 mg at 04/20/21 1051   sodium chloride (NS) flush 5-40 mL, 5-40 mL, IntraVENous, Q8H, Fabián Walter, TEA, 10 mL at 04/20/21 8249   sodium chloride (NS) flush 5-40 mL, 5-40 mL, IntraVENous, PRN, Jade Walter NP, 20 mL at 04/17/21 2707   acetaminophen (TYLENOL) tablet 650 mg, 650 mg, Oral, Q4H PRN, Jade Walter NP, 650 mg at 04/18/21 1657   hydrALAZINE (APRESOLINE) 20 mg/mL injection 10 mg, 10 mg, IntraVENous, Q4H PRN, Jade Walter NP 
  albuterol (PROVENTIL VENTOLIN) nebulizer solution 2.5 mg, 2.5 mg, Nebulization, Q4H PRN, Jade Walter NP 
  arformoterol 15 mcg/budesonide 0.5 mg neb solution, , Nebulization, BID RT, Jade Walter NP, Given at 04/20/21 1383   hydrALAZINE (APRESOLINE) tablet 100 mg, 100 mg, Oral, TID, Gucci Walter NP, 100 mg at 04/20/21 1051   hydrOXYzine HCL (ATARAX) tablet 10 mg, 10 mg, Oral, TID PRN, Jade Walter NP 
  insulin glargine (LANTUS) injection 20 Units, 20 Units, SubCUTAneous, BID, Jade Walter NP, 20 Units at 04/20/21 1051   PARoxetine (PAXIL) tablet 20 mg, 20 mg, Oral, DAILY, Gucci Walter NP, 20 mg at 04/20/21 1051 
  ranolazine ER (RANEXA) tablet 1,000 mg, 1,000 mg, Oral, BID, Gucci Walter NP, 1,000 mg at 04/20/21 1100   tafamidis cap 61 mg (Patient Supplied), 61 mg, Oral, DAILY, Gucci Wlater NP, 61 mg at 04/20/21 1055   warfarin dosing per pharmacy, , Other, Rx Dosing/Monitoring, Jade Walter NP 
  miconazole (MICOTIN) 2 % powder, , Topical, BID, Darlin Villalba MD, Given at 04/20/21 1052 PATIENT CARE TEAM: 
Patient Care Team: 
Radha Aj NP as PCP - General (Nurse Practitioner) Sarah Pat MD (Cardiology) Julio C Enriqeu MD as Physician (Cardiology) Thank you for allowing me to participate in this patient's care. TEA [place] [address], [address] Phone: [phone] Fax: [phone] [facility] ATTENDING ADDENDUM Patient was seen and examined in person. Data and notes were reviewed.  I have discussed and agree with the plan as noted in the NP note above without further additions. Addie Yusuf MD PhD 
[place]

## 2021-04-20 NOTE — PROGRESS NOTES
1930: Bedside shift change report given to Nely RN (oncoming nurse) by Mello Pacheco RN (offgoing nurse). Report included the following information SBAR, Kardex, Procedure Summary, Intake/Output, MAR and Recent Results. 0615: Pt bath using CHG. Fletcher care completed. Wound care completed. 0730: Bedside shift change report given to Miller RN (oncoming nurse) by Christa Saeed RN (offgoing nurse). Report included the following information SBAR, Kardex, Procedure Summary, Intake/Output, MAR and Recent Results.

## 2021-04-20 NOTE — PROGRESS NOTES
[facility] VAD/Heart Failure Progress Note Admit Date: 2021 POD:  Day of Surgery Procedure:  Procedure(s): RIGHT HEART CATH Subjective: Dyspnea improving; CVP still significantly elevated on RHC Objective:  
Vitals: 
Blood pressure (!) 80/0, pulse 85, temperature 98.3 °F (36.8 °C), resp. rate 23, height 5' 7\" (1.702 m), weight 261 lb 14.5 oz (118.8 kg), SpO2 98 %. Temp (24hrs), Av.1 °F (36.7 °C), Min:97.7 °F (36.5 °C), Max:98.3 °F (36.8 °C) Hemodynamics: 
 CO:   
 CI:   
 CVP:   
 SVR:   
 PAP Systolic:   
 PAP Diastolic:   
 PVR:   
 FQ09:   
 SCV02:   
 
VAD Interrogation: LVAD (Heartmate) Pump Speed (RPM): 6668 Pump Flow (LPM): 6.2 Chatter in Lines: No 
PI (Pulsitility Index): 4.7 Power: 6.6 MAP: 100  Test: No 
Back Up  at Bedside & Labeled: Yes Power Module Test: No 
Driveline Site Care: No 
Driveline Dressing: Clean, Dry, and Intact EKG/Rhythm:   
 
Extubation Date / Time:  
 
CT Output:  
 
Ventilator: 
  
 
Oxygen Therapy: 
Oxygen Therapy O2 Sat (%): 98 % (21 1121) Pulse via Oximetry: 91 beats per minute (21 1002) O2 Device: Nasal cannula (21 1121) O2 Flow Rate (L/min): 2 l/min (21 1121) CXR: 
 
Admission Weight: Last Weight Weight: 270 lb 6.4 oz (122.7 kg) Weight: 261 lb 14.5 oz (118.8 kg) Intake / Output / Drain: 
Current Shift:  0701 -  1900 In: 480 [P.O.:480] Out: 300 [Urine:300] Last 24 hrs.:  
 
Intake/Output Summary (Last 24 hours) at 2021 1533 Last data filed at 2021 1341 Gross per 24 hour Intake 600 ml Output 1440 ml Net -840 ml No results for input(s): CPK, CKMB, TROIQ in the last 72 hours. Recent Labs  
  21 
0431 21 
2807 21 
0428 21 
0547 21 
5501   139  --  137  --  136  
K 4.2  4.2  --  4.0  --  4.7 CO2 29  31  --  30  --  29 BUN 44*  45*  --  43*  --  44* CREA 2.87*  2.90*  -- 3.02*  --  3.13* *  104*  --  125*  --  147* PHOS 4.4  --   --   --   --   
MG 2.4  --  2.4  --  2.4 WBC 2.9* 3.0*  --  3.2*  --   
HGB 7.5* 7.6*  --  7.8*  --   
HCT 26.0* 25.5*  --  26.8*  --   
* 122*  --  129*  --   
 
Recent Labs  
  04/20/21 
0431 04/19/21 
0428 04/18/21 
8069 INR 2.0* 1.8* 1.8* PTP 19.8* 18.0* 18.7* No lab exists for component: PBNP Current Facility-Administered Medications:  
  warfarin (COUMADIN) tablet 4 mg, 4 mg, Oral, ONCE, Jose Angel, Melanielin Climes T, NP 
  bumetanide (BUMEX) 0.25 mg/mL infusion, 1 mg/hr, IntraVENous, CONTINUOUS, Melanie Walterlin Josees T, NP, Last Rate: 4 mL/hr at 04/20/21 1147, 1 mg/hr at 04/20/21 1147 
  [START ON 4/21/2021] therapeutic multivitamin (THERAGRAN) tablet 1 Tab, 1 Tab, Oral, DAILY, MatdMelanielin Climes T, NP 
  polyethylene glycol (MIRALAX) packet 17 g, 17 g, Oral, DAILY, Polliard, Brewster Roller, NP 
  levothyroxine (SYNTHROID) tablet 25 mcg, 25 mcg, Oral, 6am, Darius Walterford Roller, NP, 25 mcg at 04/20/21 5501   isosorbide mononitrate ER (IMDUR) tablet 30 mg, 30 mg, Oral, DAILY, Polliard, Norlin Climes T, NP, 30 mg at 04/20/21 1051   cephALEXin (KEFLEX) capsule 250 mg, 250 mg, Oral, BID, Rodolfo Wilkins MD, 250 mg at 04/20/21 1051   sodium chloride (NS) flush 5-40 mL, 5-40 mL, IntraVENous, Q8H, MatdMelanielin Josees T, NP, 10 mL at 04/20/21 4136   sodium chloride (NS) flush 5-40 mL, 5-40 mL, IntraVENous, PRN, Polliard, Norlin Climes T, NP, 20 mL at 04/17/21 4475   acetaminophen (TYLENOL) tablet 650 mg, 650 mg, Oral, Q4H PRN, Polliard, Brewster Roller, NP, 650 mg at 04/18/21 9411   hydrALAZINE (APRESOLINE) 20 mg/mL injection 10 mg, 10 mg, IntraVENous, Q4H PRN, Polliard, Brewster Roller, NP 
  albuterol (PROVENTIL VENTOLIN) nebulizer solution 2.5 mg, 2.5 mg, Nebulization, Q4H PRN, Frankiargeoff, Ney Burreller, NP 
  arformoterol 15 mcg/budesonide 0.5 mg neb solution, , Nebulization, BID RT, Frankiargeoff, Ney Long, NP, Given at 04/20/21 9641   hydrALAZINE (APRESOLINE) tablet 100 mg, 100 mg, Oral, TID, Polliard, Eleonora Grise T, NP, 100 mg at 04/20/21 1051   hydrOXYzine HCL (ATARAX) tablet 10 mg, 10 mg, Oral, TID PRN, Polliard, Clifford Slim, NP 
  insulin glargine (LANTUS) injection 20 Units, 20 Units, SubCUTAneous, BID, Polliard, Clifford Slim, NP, 20 Units at 04/20/21 1051   PARoxetine (PAXIL) tablet 20 mg, 20 mg, Oral, DAILY, Polliard, Eleonora Grise T, NP, 20 mg at 04/20/21 1051 
  ranolazine ER (RANEXA) tablet 1,000 mg, 1,000 mg, Oral, BID, Polliard, Eleonora Grise T, NP, 1,000 mg at 04/20/21 1100   tafamidis cap 61 mg (Patient Supplied), 61 mg, Oral, DAILY, Polliard, Eleonora Grise T, NP, 61 mg at 04/20/21 1055   warfarin dosing per pharmacy, , Other, Rx Dosing/Monitoring, Polliard, Clifford Slim, NP 
  miconazole (MICOTIN) 2 % powder, , Topical, BID, Grant Christian MD, Given at 04/20/21 1052 A/P 
  
S/P LVAD - good flows Need for [facility] - coumadin Fluid overload - diuresis Acute kidney injury - monitor  
  
Risk of morbidity and mortality - high Medical decision making - high complexity Signed By: Elgin Ventura MD

## 2021-04-20 NOTE — PROCEDURES
Right Heart Catheterization Preliminary Note Patient: Олег Gonzalez  MRN: [id number]  SSN: [id number] YOB: 1950 Age: 78 y.o. Sex: female Date of Procedure: 4/20/2021 PCP: Luke Vivar NP Cardiologist: Ludivina Stallings MD 
AHF: Maricarmen Gutierrez MD PhD 
 
Indications: This is a 78 y.o. female who presents with acute on chronic systolic heart failure. She has an LVAD at 9600 rpm.. Procedure: 
Informed consent was obtained. Time out was performed. The patient was brought to the cardiac catheterization lab and the right arm prepped and draped in the usual sterile fashion. The skin was infiltrated with lidocaine, and the brachial vein accessed using the modified Seldinger technique with ultrasound guidance. A 6 Fr sheath was advanced without difficulty. A 5.5 Fr PA catheter was then advanced into the right heart. Hemodynamic measurements were obtained. Cardiac output measurement were obtained. At the termination of the case, the catheter and sheath were removed and manual pressure applied until hemostasis was achieved. The patient tolerated the procedure well and was transferred to recovery in stable condition. Blood Loss: Minimal. 
Complications: None Findings:  
RA = 23/28/24 RV = 51/14/24 PA = 55/27/38 PCWP = 28/32/23 PA Sat = 53.9 % Ao Sat = 91 % Hgb = 8.2 mg/dl CO/CI = 6.9/3.0 (Thermo) CO/CI = 6.9/3.0 (Sal) Alpa Concepcion MD 
4/20/2021, 9:06 AM 
 
[facility] Office:   [address] [facility] Office: 
[address] [facility]    [address] [facility] [address]     [address] P: [id number]    P: [phone] F: [phone]    F: [phone]

## 2021-04-20 NOTE — PROGRESS NOTES
[address] [facility] Adult  Hospitalist Group Hospitalist Progress Note Loc Llanos MD 
Answering service: [phone] OR [phone] from in house phone Date of Service:  2021 NAME:  Manuel Cartwright :  1950 MRN:  [id number] Please note that this dictation was completed with [employer], the computer voice recognition software. Quite often unanticipated grammatical, syntax, homophones, and other interpretive errors are inadvertently transcribed by the computer software. Please disregard these errors. Please excuse any errors that have escaped final proofreading. Admission Summary:  
Manuel Cartwright is a 78 y.o. female with a past medical history of heart failure s/p LVAD, COPD, CKD stage 3, pulmonary HTN, Depression, HTN, and HLD who presented to F appointment with complaints of chest tightness and increasing dyspnea. She was instructed to come to [facility] [place] for admission for further treatment. At time of examination patient experiencing dyspnea but states she's feeling 100% better than what she felt yesterday. Hospitalist was consulted for admission.  
  
 
Interval history / Subjective:  
 
Patient seen and examined She is doing good today and is not short of breath today Assessment & Plan: Dyspnea due to acute on Chronic systolic congestive heart failure , s/p LVAD 
- NYHA class IV  
- s/p LVAD heartMate 2 , increased speed per HF team  
- Advanced heart failure team following  
- Continue Bumex, was on albumin , now both bumex and albumin is stopped  
bumex restarted on  
- Cardiac diet  
tafamidis 61 mg PO daily Hydralazine 100 mg po TID and ranexa RHC today Diuretics will be adjusted  
  
COPD 
- PRN nebs CKD stage 3 Avoid nephrotoxic agents and worsening kidney finction  
nephro has been consulted  
  
DM  
- A1c 7.2  
- Monitor BG AC/HS  
- Continue lantus 20 units   
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  
  
JED - Continue home trilogy 
  
Morbid Obesity - BMI 42.29 
- Weight loss management to be followed up outpatient Depression Paroxetine H/o breast cancer (1992) · s/p bilateral mastectomy/chemo and endometrial cancer s/p hysterectomy Atrial fibrillation Warfarin New onset pancytopenia Etiology unknown at this point Keflex induced ? ? Code status: full DVT prophylaxis:  
 
Care Plan discussed with: Patient/Family Anticipated Disposition: Home w/Family Anticipated Discharge: 24 hours to 48 hours Hospital Problems  Date Reviewed: 3/16/2021 Codes Class Noted POA Dyspnea ICD-10-CM: R06.00 
ICD-9-CM: 786.09  4/16/2021 Unknown Review of Systems: A comprehensive review of systems was negative except for that written in the HPI. Vital Signs:  
 Last 24hrs VS reviewed since prior progress note. Most recent are: 
Visit Vitals BP (!) 80/0 Pulse 85 Temp 98.3 °F (36.8 °C) Resp 23 Ht 5' 7\" (1.702 m) Wt 118.8 kg (261 lb 14.5 oz) SpO2 98% BMI 41.02 kg/m² Intake/Output Summary (Last 24 hours) at 4/20/2021 1421 Last data filed at 4/20/2021 1341 Gross per 24 hour Intake 600 ml Output 2015 ml Net -1415 ml Physical Examination:  
 
I had a face to face encounter with this patient and independently examined them on 04/20/21 as outlined below: 
     
Constitutional: Acute resp distress ENT:  Oral mucosa moist, oropharynx benign. Resp: Accessory muscle use , tachypnic CV:  Regular rhythm, normal rate, no murmurs, gallops, rubs, lvad hum GI:  Soft, non distended, non tender. normoactive bowel sounds, no hepatosplenomegaly Musculoskeletal:  No edema, warm, 2+ pulses throughout Neurologic:  Moves all extremities. AAOx3, CN II-XII reviewed Data Review:  
 Review and/or order of clinical lab test 
 
 
Labs:  
 
Recent Labs  
  04/20/21 0431 04/19/21 0432 WBC 2.9* 3.0* HGB 7.5* 7.6* HCT 26.0* 25.5*  
* 122* Recent Labs  
  04/20/21 0431 04/19/21 0428 04/18/21 
3812   139 137 136  
K 4.2  4.2 4.0 4.7   102 101 100 CO2 29  31 30 29 BUN 44*  45* 43* 44* CREA 2.87*  2.90* 3.02* 3.13* *  104* 125* 147* CA 8.9  8.8 8.9 9.2 MG 2.4 2.4 2.4 PHOS 4.4  --   --   
 
Recent Labs  
  04/20/21 0431 04/19/21 0428 04/18/21 
4617 ALT 8* 6* 8* AP 54 54 56 TBILI 0.5 0.5 0.6 TP 7.8 7.2 7.9 ALB 3.4*  3.5 3.6 3.6 GLOB 4.4* 3.6 4.3* Recent Labs  
  04/20/21 0431 04/19/21 0428 04/18/21 
6275 INR 2.0* 1.8* 1.8* PTP 19.8* 18.0* 18.7* No results for input(s): FE, TIBC, PSAT, FERR in the last 72 hours. Lab Results Component Value Date/Time Folate 10.0 10/28/2020 03:56 AM  
  
No results for input(s): PH, PCO2, PO2 in the last 72 hours. No results for input(s): CPK, CKNDX, TROIQ in the last 72 hours. No lab exists for component: CPKMB Lab Results Component Value Date/Time Cholesterol, total 129 04/16/2021 10:40 AM  
 HDL Cholesterol 33 04/16/2021 10:40 AM  
 LDL, calculated 74 04/16/2021 10:40 AM  
 Triglyceride 110 04/16/2021 10:40 AM  
 CHOL/HDL Ratio 3.9 04/16/2021 10:40 AM  
 
Lab Results Component Value Date/Time Glucose (POC) 127 (H) 04/20/2021 12:02 PM  
 Glucose (POC) 129 (H) 04/20/2021 07:00 AM  
 Glucose (POC) 138 (H) 04/19/2021 09:16 PM  
 Glucose (POC) 138 (H) 04/19/2021 04:38 PM  
 Glucose (POC) 184 (H) 04/19/2021 11:08 AM  
 
Lab Results Component Value Date/Time  Color YELLOW/STRAW 04/18/2021 05:08 PM  
 Appearance CLEAR 04/18/2021 05:08 PM  
 Specific gravity 1.016 04/18/2021 05:08 PM  
 Specific gravity 1.013 11/09/2020 12:05 AM  
 pH (UA) 6.5 04/18/2021 05:08 PM  
 Protein 30 (A) 04/18/2021 05:08 PM  
 Glucose Negative 04/18/2021 05:08 PM  
 Ketone Negative 04/18/2021 05:08 PM  
 Bilirubin Negative 04/18/2021 05:08 PM  
 Urobilinogen 0.2 04/18/2021 05:08 PM  
 Nitrites Negative 04/18/2021 05:08 PM  
 Leukocyte Esterase SMALL (A) 04/18/2021 05:08 PM  
 Epithelial cells FEW 04/18/2021 05:08 PM  
 Bacteria Negative 04/18/2021 05:08 PM  
 WBC 0-4 04/18/2021 05:08 PM  
 RBC 0-5 04/18/2021 05:08 PM  
 
 
 
Medications Reviewed:  
 
Current Facility-Administered Medications Medication Dose Route Frequency  warfarin (COUMADIN) tablet 4 mg  4 mg Oral ONCE  
 bumetanide (BUMEX) 0.25 mg/mL infusion  1 mg/hr IntraVENous CONTINUOUS  
 levothyroxine (SYNTHROID) tablet 25 mcg  25 mcg Oral 6am  
 isosorbide mononitrate ER (IMDUR) tablet 30 mg  30 mg Oral DAILY  cephALEXin (KEFLEX) capsule 250 mg  250 mg Oral BID  sodium chloride (NS) flush 5-40 mL  5-40 mL IntraVENous Q8H  
 sodium chloride (NS) flush 5-40 mL  5-40 mL IntraVENous PRN  
 acetaminophen (TYLENOL) tablet 650 mg  650 mg Oral Q4H PRN  
 hydrALAZINE (APRESOLINE) 20 mg/mL injection 10 mg  10 mg IntraVENous Q4H PRN  
 albuterol (PROVENTIL VENTOLIN) nebulizer solution 2.5 mg  2.5 mg Nebulization Q4H PRN  
 arformoterol 15 mcg/budesonide 0.5 mg neb solution   Nebulization BID RT  
 hydrALAZINE (APRESOLINE) tablet 100 mg  100 mg Oral TID  hydrOXYzine HCL (ATARAX) tablet 10 mg  10 mg Oral TID PRN  
 insulin glargine (LANTUS) injection 20 Units  20 Units SubCUTAneous BID  PARoxetine (PAXIL) tablet 20 mg  20 mg Oral DAILY  ranolazine ER (RANEXA) tablet 1,000 mg  1,000 mg Oral BID  tafamidis cap 61 mg (Patient Supplied)  61 mg Oral DAILY  warfarin dosing per pharmacy   Other Rx Dosing/Monitoring  miconazole (MICOTIN) 2 % powder   Topical BID  
 
______________________________________________________________________ EXPECTED LENGTH OF STAY: 4d 0h 
ACTUAL LENGTH OF STAY:          4 Shirin Mcelroy MD

## 2021-04-20 NOTE — PROGRESS NOTES
[facility] VAD/Heart Failure Progress Note Admit Date: 2021 POD:  Day of Surgery Procedure:  Procedure(s): RIGHT HEART CATH Subjective: Dyspnea improved; RHC tomorrow; good flows Objective:  
Vitals: 
Blood pressure (!) 80/0, pulse 88, temperature 97.7 °F (36.5 °C), resp. rate 20, height 5' 7\" (1.702 m), weight 261 lb 14.5 oz (118.8 kg), SpO2 97 %. Temp (24hrs), Av.9 °F (36.6 °C), Min:97.7 °F (36.5 °C), Max:98 °F (36.7 °C) Hemodynamics: 
 CO:   
 CI:   
 CVP:   
 SVR:   
 PAP Systolic:   
 PAP Diastolic:   
 PVR:   
 WO98:   
 SCV02:   
 
VAD Interrogation: LVAD (Heartmate) Pump Speed (RPM): 7483 Pump Flow (LPM): 5.6 Chatter in Lines: No 
PI (Pulsitility Index): 5.2 Power: 6.3 MAP: 80  Test: No 
Back Up  at Bedside & Labeled: Yes Power Module Test: No 
Driveline Site Care: No 
Driveline Dressing: Clean, Dry, and Intact EKG/Rhythm:   
 
Extubation Date / Time:  
 
CT Output:  
 
Ventilator: 
  
 
Oxygen Therapy: 
Oxygen Therapy O2 Sat (%): 97 % (21 0807) Pulse via Oximetry: 80 beats per minute (21 0717) O2 Device: Nasal cannula (21 08) O2 Flow Rate (L/min): 2.5 l/min (21 0807) CXR: 
 
Admission Weight: Last Weight Weight: 270 lb 6.4 oz (122.7 kg) Weight: 261 lb 14.5 oz (118.8 kg) Intake / Output / Drain: 
Current Shift: No intake/output data recorded. Last 24 hrs.:  
 
Intake/Output Summary (Last 24 hours) at 2021 Last data filed at 2021 2769 Gross per 24 hour Intake 480 ml Output 2440 ml Net -1960 ml No results for input(s): CPK, CKMB, TROIQ in the last 72 hours. Recent Labs  
  21 
04321 
0547 21 
6178   139  --  137  --  136  
K 4.2  4.2  --  4.0  --  4.7 CO2 29  31  --  30  --  29 BUN 44*  45*  --  43*  --  44* CREA 2.87*  2.90*  --  3.02*  --  3.13* *  104*  --  125* --  147* PHOS 4.4  --   --   --   --   
MG 2.4  --  2.4  --  2.4 WBC 2.9* 3.0*  --  3.2*  --   
HGB 7.5* 7.6*  --  7.8*  --   
HCT 26.0* 25.5*  --  26.8*  --   
* 122*  --  129*  --   
 
Recent Labs  
  04/20/21 
0431 04/19/21 
0428 04/18/21 
5579 INR 2.0* 1.8* 1.8* PTP 19.8* 18.0* 18.7* No lab exists for component: PBNP Current Facility-Administered Medications:  
  warfarin (COUMADIN) tablet 4 mg, 4 mg, Oral, ONCE, Ezekiel Walter NP 
  bumetanide (BUMEX) injection 2 mg, 2 mg, IntraVENous, BID, Reyes Fern, MD, 2 mg at 04/19/21 1805   levothyroxine (SYNTHROID) tablet 25 mcg, 25 mcg, Oral, 6am, Jocelynn Walter NP, 25 mcg at 04/20/21 0278   isosorbide mononitrate ER (IMDUR) tablet 30 mg, 30 mg, Oral, DAILY, Ezekiel Walter NP, 30 mg at 04/19/21 1140   cephALEXin (KEFLEX) capsule 250 mg, 250 mg, Oral, BID, Luci Allen MD, 250 mg at 04/19/21 2703   sodium chloride (NS) flush 5-40 mL, 5-40 mL, IntraVENous, Q8H, Ezekiel Walter NP, 10 mL at 04/20/21 1990   sodium chloride (NS) flush 5-40 mL, 5-40 mL, IntraVENous, PRN, Ezekiel Walter NP, 20 mL at 04/17/21 4024   acetaminophen (TYLENOL) tablet 650 mg, 650 mg, Oral, Q4H PRN, Jocelynn Walter NP, 650 mg at 04/18/21 7442   hydrALAZINE (APRESOLINE) 20 mg/mL injection 10 mg, 10 mg, IntraVENous, Q4H PRN, Polliard, Karenann Begun, NP 
  albuterol (PROVENTIL VENTOLIN) nebulizer solution 2.5 mg, 2.5 mg, Nebulization, Q4H PRN, Jocelynn Walter, NP 
  arformoterol 15 mcg/budesonide 0.5 mg neb solution, , Nebulization, BID RT, Jocelynn Walter, NP, Given at 04/20/21 5298   hydrALAZINE (APRESOLINE) tablet 100 mg, 100 mg, Oral, TID, Jocelynn Walter, NP, 100 mg at 04/19/21 2138   hydrOXYzine HCL (ATARAX) tablet 10 mg, 10 mg, Oral, TID PRN, Jocelynn Walter, NP 
  insulin glargine (LANTUS) injection 20 Units, 20 Units, SubCUTAneous, BID, Jocelynn Walter, NP, 20 Units at 04/19/21 2138   PARoxetine (PAXIL) tablet 20 mg, 20 mg, Oral, DAILY, Mikaela Walter NP, 20 mg at 04/19/21 0949 
  ranolazine ER (RANEXA) tablet 1,000 mg, 1,000 mg, Oral, BID, Dominique Walter NP, 1,000 mg at 04/19/21 5236   tafamidis cap 61 mg (Patient Supplied), 61 mg, Oral, DAILY, Mikaela Walter NP, 61 mg at 04/19/21 1005   warfarin dosing per pharmacy, , Other, Rx Dosing/Monitoring, Dominique Walter, NP 
  miconazole (MICOTIN) 2 % powder, , Topical, BID, Colby Cisse MD, Given at 04/19/21 1805 A/P 
  
S/P LVAD - good flows [facility] Fluid overload - diuresis Acute kidney injury - monitor  
  
Risk of morbidity and mortality - high Medical decision making - high complexity Signed By: Roxy Garcias MD

## 2021-04-20 NOTE — PROGRESS NOTES
SW met with patient to attempt to complete an AMD, patient stated her daughter Ellender Kayser was out of the room for business and may not return until  After 5:00 pm. SW will reach out to Ellender Kayser for a time that is convenient to her to complete the advance directive. Patient stated she may be moving to [facility] to live with Ellender Kayser, however patient stated she and her daughter need to discuss logistics before the move. SW will continue to follow.

## 2021-04-20 NOTE — PROGRESS NOTES
[facility] 
 [address] [facility], [address] [facility] Phone: [phone]  
  
Nephrology Progress Note 
Ulisses Johnson     1950     [id number] Date of Admission : 4/16/2021 04/20/21 CC: Follow up for ROCIO Assessment and Plan ROCIO on CKD 
- Progressive CKD from CRS vs IVVD from diuresis  
- renal US shows small and echogenic Kidneys consistent w/ progressive CKD 
- continue  Bumex 2 mg BID 
- adjust diuretics post RHC  
- remove sahu  
- daily labs  
  
CKD stage III. - baseline around 2 
- presumed 2/2 DM, HTN. 
- Bone marrow Bx not done. Renal US appearance of Kidneys no suggestive of Amyloidosis  
  
Chronic systolic CHF, stage C NYHA class IV 
-Combined ischemic and nonischemic cardiomyopathy 
-S/p HM 2 LVAD implant 4/15/2017 by Dr. Alize Zhang at [facility] 16 
- hx of recurrent VT : off mexiletine - chronic RV failure. 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 Chronic anemia :  
+ve PYP testing.  
- anemia of chronic disease  
  
Severe COPD. Pulmonary hypertension. Chronic A. fib. History of endometrial Ca ATTR amyloidosis Interval History: 
Seen and examined Good UOP Stable renal function Discussed w/ daughter at bedside. ..she is concerned about slight twitchy movements of arms and legs --- its def better than what she had during admission in October 2020 She has been off gabapentine since Denies any N/V/CP/SOB Review of Systems: A comprehensive review of systems was negative except for that written in the HPI. Current Medications:  
Current Facility-Administered Medications Medication Dose Route Frequency  warfarin (COUMADIN) tablet 4 mg  4 mg Oral ONCE  
 bumetanide (BUMEX) injection 2 mg  2 mg IntraVENous BID  levothyroxine (SYNTHROID) tablet 25 mcg  25 mcg Oral 6am  
 isosorbide mononitrate ER (IMDUR) tablet 30 mg  30 mg Oral DAILY  cephALEXin (KEFLEX) capsule 250 mg  250 mg Oral BID  sodium chloride (NS) flush 5-40 mL  5-40 mL IntraVENous Q8H  
 sodium chloride (NS) flush 5-40 mL  5-40 mL IntraVENous PRN  
 acetaminophen (TYLENOL) tablet 650 mg  650 mg Oral Q4H PRN  
 hydrALAZINE (APRESOLINE) 20 mg/mL injection 10 mg  10 mg IntraVENous Q4H PRN  
 albuterol (PROVENTIL VENTOLIN) nebulizer solution 2.5 mg  2.5 mg Nebulization Q4H PRN  
 arformoterol 15 mcg/budesonide 0.5 mg neb solution   Nebulization BID RT  
 hydrALAZINE (APRESOLINE) tablet 100 mg  100 mg Oral TID  hydrOXYzine HCL (ATARAX) tablet 10 mg  10 mg Oral TID PRN  
 insulin glargine (LANTUS) injection 20 Units  20 Units SubCUTAneous BID  PARoxetine (PAXIL) tablet 20 mg  20 mg Oral DAILY  ranolazine ER (RANEXA) tablet 1,000 mg  1,000 mg Oral BID  tafamidis cap 61 mg (Patient Supplied)  61 mg Oral DAILY  warfarin dosing per pharmacy   Other Rx Dosing/Monitoring  miconazole (MICOTIN) 2 % powder   Topical BID Allergies Allergen Reactions  Benzodiazepines Other (comments) Respiratory issues Objective: 
Vitals:   
Vitals:  
 04/20/21 7500 04/20/21 6954 04/20/21 2705 04/20/21 3691 BP:      
Pulse: 84   85 Resp: 19   26 Temp: 97.7 °F (36.5 °C) SpO2: 98%  98% 97% Weight:  118.8 kg (261 lb 14.5 oz) Height:      
 
Intake and Output: 
No intake/output data recorded. 04/18 1901 - 04/20 0700 In: 720 [P.O.:720] Out: 3240 [[facility]:7255] Physical Examination: 
General: Morbidly obese, in NAD Toy Holster Neck: Supple,no mass palpable Lungs : CTA 
CVS: RRR, VAD sounds Abdomen: Soft, NT, BS +, obese Extremities: LUE lymphedema Skin: No rash or lesions. MS: No joint swelling, erythema, warmth Neurologic: awake and alert 
  
 
[]    High complexity decision making was performed 
[]    Patient is at high-risk of decompensation with multiple organ involvement Lab Data Personally Reviewed: I have reviewed all the pertinent labs, microbiology data and radiology studies during assessment. Recent Labs  
  04/20/21 0431 04/19/21 0428 04/18/21 
7668   139 137 136  
K 4.2  4.2 4.0 4.7   102 101 100 CO2 29  31 30 29 *  104* 125* 147* BUN 44*  45* 43* 44* CREA 2.87*  2.90* 3.02* 3.13* CA 8.9  8.8 8.9 9.2 MG 2.4 2.4 2.4 PHOS 4.4  --   --   
ALB 3.4*  3.5 3.6 3.6 ALT 8* 6* 8* INR 2.0* 1.8* 1.8* Recent Labs  
  04/20/21 0431 04/19/21 0432 04/18/21 
0547 WBC 2.9* 3.0* 3.2* HGB 7.5* 7.6* 7.8* HCT 26.0* 25.5* 26.8*  
* 122* 129* No results found for: SDES Lab Results Component Value Date/Time Culture result: NO GROWTH 5 DAYS 11/10/2020 06:42 PM  
 Culture result: No Growth (<1000 cfu/mL) 11/09/2020 12:05 AM  
 Culture result: SCANT Morganella morganii ssp morganii (A) 10/30/2020 11:30 AM  
 Culture result: SCANT Morganella morganii ssp morganii (A) 10/30/2020 11:30 AM  
 Culture result: RARE DIPHTHEROIDS (A) 10/30/2020 11:30 AM  
 
Recent Results (from the past 24 hour(s)) GLUCOSE, POC Collection Time: 04/19/21 11:08 AM  
Result Value Ref Range Glucose (POC) 184 (H) 65 - 100 mg/dL Performed by Flavio Jain  PCT   
ECHO ADULT FOLLOW-UP OR LIMITED Collection Time: 04/19/21  3:47 PM  
Result Value Ref Range IVSd 1.09 (A) 0.60 - 0.90 cm LVIDd 6.68 (A) 3.90 - 5.30 cm LVIDs 6.06 cm  
 LVPWd 1.04 (A) 0.60 - 0.90 cm RVIDd 5.14 cm  
 RVSP 39.72 mmHg Left Atrium Major Axis 4.46 cm Est. RA Pressure 5.00 mmHg Tapse 1.10 (A) 1.50 - 2.00 cm Triscuspid Valve Regurgitation Peak Gradient 34.72 mmHg  
 TR Max Velocity 294.62 cm/s  
 LV Mass .5 67.0 - 162.0 g  
 LV Mass AL Index 138.8 43.0 - 95.0 g/m2 Left Atrium Minor Axis 1.92 cm GLUCOSE, POC Collection Time: 04/19/21  4:38 PM  
Result Value Ref Range Glucose (POC) 138 (H) 65 - 100 mg/dL Performed by Flavio JOHNSON   
GLUCOSE, POC Collection Time: 04/19/21  9:16 PM  
Result Value Ref Range  Glucose (POC) 138 (H) 65 - 100 mg/dL Performed by Chidi SALAZAR Collection Time: 04/20/21  4:31 AM  
Result Value Ref Range  81 - 246 U/L  
PROTHROMBIN TIME + INR Collection Time: 04/20/21  4:31 AM  
Result Value Ref Range INR 2.0 (H) 0.9 - 1.1 Prothrombin time 19.8 (H) 9.0 - 11.1 sec PROCALCITONIN Collection Time: 04/20/21  4:31 AM  
Result Value Ref Range Procalcitonin 0.24 ng/mL NT-PRO BNP Collection Time: 04/20/21  4:31 AM  
Result Value Ref Range NT pro-BNP 8,182 (H) <125 PG/ML  
LACTIC ACID Collection Time: 04/20/21  4:31 AM  
Result Value Ref Range Lactic acid 0.4 0.4 - 2.0 MMOL/L  
MAGNESIUM Collection Time: 04/20/21  4:31 AM  
Result Value Ref Range Magnesium 2.4 1.6 - 2.4 mg/dL METABOLIC PANEL, COMPREHENSIVE Collection Time: 04/20/21  4:31 AM  
Result Value Ref Range Sodium 140 136 - 145 mmol/L Potassium 4.2 3.5 - 5.1 mmol/L Chloride 103 97 - 108 mmol/L  
 CO2 29 21 - 32 mmol/L Anion gap 8 5 - 15 mmol/L Glucose 104 (H) 65 - 100 mg/dL BUN 44 (H) 6 - 20 MG/DL Creatinine 2.87 (H) 0.55 - 1.02 MG/DL  
 BUN/Creatinine ratio 15 12 - 20 GFR est AA 20 (L) >60 ml/min/1.73m2 GFR est non-AA 16 (L) >60 ml/min/1.73m2 Calcium 8.9 8.5 - 10.1 MG/DL Bilirubin, total 0.5 0.2 - 1.0 MG/DL  
 ALT (SGPT) 8 (L) 12 - 78 U/L  
 AST (SGOT) 16 15 - 37 U/L Alk. phosphatase 54 45 - 117 U/L Protein, total 7.8 6.4 - 8.2 g/dL Albumin 3.4 (L) 3.5 - 5.0 g/dL Globulin 4.4 (H) 2.0 - 4.0 g/dL A-G Ratio 0.8 (L) 1.1 - 2.2    
CBC W/O DIFF Collection Time: 04/20/21  4:31 AM  
Result Value Ref Range WBC 2.9 (L) 3.6 - 11.0 K/uL  
 RBC 2.88 (L) 3.80 - 5.20 M/uL HGB 7.5 (L) 11.5 - 16.0 g/dL HCT 26.0 (L) 35.0 - 47.0 % MCV 90.3 80.0 - 99.0 FL  
 MCH 26.0 26.0 - 34.0 PG  
 MCHC 28.8 (L) 30.0 - 36.5 g/dL  
 RDW 17.4 (H) 11.5 - 14.5 % PLATELET 306 (L) 444 - 400 K/uL MPV 9.5 8.9 - 12.9 FL  
 NRBC 0.0 0  WBC  ABSOLUTE NRBC 0.00 0.00 - 0.01 K/uL RENAL FUNCTION PANEL Collection Time: 04/20/21  4:31 AM  
Result Value Ref Range Sodium 139 136 - 145 mmol/L Potassium 4.2 3.5 - 5.1 mmol/L Chloride 102 97 - 108 mmol/L  
 CO2 31 21 - 32 mmol/L Anion gap 6 5 - 15 mmol/L Glucose 104 (H) 65 - 100 mg/dL BUN 45 (H) 6 - 20 MG/DL Creatinine 2.90 (H) 0.55 - 1.02 MG/DL  
 BUN/Creatinine ratio 16 12 - 20 GFR est AA 20 (L) >60 ml/min/1.73m2 GFR est non-AA 16 (L) >60 ml/min/1.73m2 Calcium 8.8 8.5 - 10.1 MG/DL Phosphorus 4.4 2.6 - 4.7 MG/DL Albumin 3.5 3.5 - 5.0 g/dL GLUCOSE, POC Collection Time: 04/20/21  7:00 AM  
Result Value Ref Range Glucose (POC) 129 (H) 65 - 100 mg/dL Performed by Henry Xiao I have reviewed the flowsheets. Chart and Pertinent Notes have been reviewed. No change in PMH ,family and social history from Consult note.  
 
 
Manny Greco MD

## 2021-04-20 NOTE — PROGRESS NOTES
Interventional Cardiology Progress Note Name: Atif Harrison : 1950 MRN: [id number] Date: 2021 Doing well s/p Right Heart Cath. Right arm soft without hematoma. No pain. Breathing OK. Diuresis on progress. Daughter updated.  
 
 
Andrzej Riggins MD 
21 
6:03 PM

## 2021-04-21 NOTE — PROGRESS NOTES
[facility] 
 [address] [facility] Phone: [phone]  
  
Nephrology Progress Note 
Lisa Cheney     1950     [id number] Date of Admission : 4/16/2021 04/21/21 CC: Follow up for ROCIO Assessment and Plan ROCIO on CKD 
- 2/2 CRS  
- continue  Bumex gtt at 1 mg / hr x 24 hrs more  
- labs again at 2 pm for electrolytes - remove sahu  
  
CKD stage III. - baseline around 2 
- presumed 2/2 DM, HTN. 
- Bone marrow Bx not done. - Renal US appearance of Kidneys no suggestive of Amyloidosis  
  
Chronic systolic CHF, stage C NYHA class IV 
-Combined ischemic and nonischemic cardiomyopathy 
-S/p HM 2 LVAD implant 4/15/2017 by Dr. Claudia Landin at [facility] 
- hx of recurrent VT : off mexiletine - chronic RV failure. 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 Chronic anemia :  
+ve PYP testing.  
- anemia of chronic disease  
- started Epogen Leucopenia, Thrombocytopenia   
Severe COPD. Pulmonary hypertension. Chronic A. fib. History of endometrial Ca ATTR amyloidosis Interval History: 
Seen and examined [facility] findings noted >3l uop w/ Bumex gtt Weight unchanged Edema improving including LUE No new sx Review of Systems: A comprehensive review of systems was negative except for that written in the HPI. Current Medications:  
Current Facility-Administered Medications Medication Dose Route Frequency  albumin human 25% (BUMINATE) solution 12.5 g  12.5 g IntraVENous Q6H  
 bumetanide (BUMEX) 0.25 mg/mL infusion  1 mg/hr IntraVENous CONTINUOUS  therapeutic multivitamin (THERAGRAN) tablet 1 Tab  1 Tab Oral DAILY  polyethylene glycol (MIRALAX) packet 17 g  17 g Oral DAILY  levothyroxine (SYNTHROID) tablet 25 mcg  25 mcg Oral 6am  
 isosorbide mononitrate ER (IMDUR) tablet 30 mg  30 mg Oral DAILY  cephALEXin (KEFLEX) capsule 250 mg  250 mg Oral BID  sodium chloride (NS) flush 5-40 mL 5-40 mL IntraVENous Q8H  
 sodium chloride (NS) flush 5-40 mL  5-40 mL IntraVENous PRN  
 acetaminophen (TYLENOL) tablet 650 mg  650 mg Oral Q4H PRN  
 hydrALAZINE (APRESOLINE) 20 mg/mL injection 10 mg  10 mg IntraVENous Q4H PRN  
 albuterol (PROVENTIL VENTOLIN) nebulizer solution 2.5 mg  2.5 mg Nebulization Q4H PRN  
 arformoterol 15 mcg/budesonide 0.5 mg neb solution   Nebulization BID RT  
 hydrALAZINE (APRESOLINE) tablet 100 mg  100 mg Oral TID  hydrOXYzine HCL (ATARAX) tablet 10 mg  10 mg Oral TID PRN  
 insulin glargine (LANTUS) injection 20 Units  20 Units SubCUTAneous BID  PARoxetine (PAXIL) tablet 20 mg  20 mg Oral DAILY  ranolazine ER (RANEXA) tablet 1,000 mg  1,000 mg Oral BID  tafamidis cap 61 mg (Patient Supplied)  61 mg Oral DAILY  warfarin dosing per pharmacy   Other Rx Dosing/Monitoring  miconazole (MICOTIN) 2 % powder   Topical BID Allergies Allergen Reactions  Benzodiazepines Other (comments) Respiratory issues Objective: 
Vitals:   
Vitals:  
 04/20/21 2318 04/21/21 0110 04/21/21 4092 04/21/21 4752 BP:      
Pulse: 96 87 80 Resp: 20 14 16 Temp: 98.6 °F (37 °C)  98.3 °F (36.8 °C) SpO2: 98% 98% 99% Weight:    118.4 kg (261 lb 0.4 oz) Height:      
 
Intake and Output: 
04/20 1901 - 04/21 0700 In: 360 [P.O.:360] Out: 3100 [Urine:3100] 04/19 0701 - 04/20 1900 In: 1216.9 [P.O.:1200; I.V.:16.9] Out: 3640 [BUQBR:5460] Physical Examination: 
General: Morbidly obese, in NAD Jairo  Neck: Supple,no mass palpable Lungs : CTA 
CVS: RRR, VAD sounds Abdomen: Soft, NT, BS +, obese Extremities: LUE lymphedema Skin: No rash or lesions. MS: No joint swelling, erythema, warmth Neurologic: AOOX 3  
 : sahu + 
  
 
[]    High complexity decision making was performed 
[]    Patient is at high-risk of decompensation with multiple organ involvement Lab Data Personally Reviewed: I have reviewed all the pertinent labs, microbiology data and radiology studies during assessment. Recent Labs  
  04/21/21 
0117 04/20/21 
0431 04/19/21 
7142  140  139 137  
K 4.0 4.2  4.2 4.0  
CL 99 103  102 101 CO2 31 29  31 30 GLU 84 104*  104* 125* BUN 42* 44*  45* 43* CREA 2.77* 2.87*  2.90* 3.02* CA 9.0 8.9  8.8 8.9 MG 2.3 2.4 2.4 PHOS  --  4.4  --   
ALB 3.4* 3.4*  3.5 3.6 ALT 7* 8* 6* INR 2.1* 2.0* 1.8* Recent Labs  
  04/20/21 
0431 04/19/21 
5223 WBC 2.9* 3.0* HGB 7.5* 7.6* HCT 26.0* 25.5*  
* 122* No results found for: SDES Lab Results Component Value Date/Time Culture result: NO GROWTH 5 DAYS 11/10/2020 06:42 PM  
 Culture result: No Growth (<1000 cfu/mL) 11/09/2020 12:05 AM  
 Culture result: SCANT Morganella morganii ssp morganii (A) 10/30/2020 11:30 AM  
 Culture result: SCANT Morganella morganii ssp morganii (A) 10/30/2020 11:30 AM  
 Culture result: RARE DIPHTHEROIDS (A) 10/30/2020 11:30 AM  
 
Recent Results (from the past 24 hour(s)) GLUCOSE, POC Collection Time: 04/20/21  7:00 AM  
Result Value Ref Range Glucose (POC) 129 (H) 65 - 100 mg/dL Performed by Colleen Cruz GLUCOSE, POC Collection Time: 04/20/21 12:02 PM  
Result Value Ref Range Glucose (POC) 127 (H) 65 - 100 mg/dL Performed by Rupesh Don GLUCOSE, POC Collection Time: 04/20/21  4:26 PM  
Result Value Ref Range Glucose (POC) 108 (H) 65 - 100 mg/dL Performed by Gavin JOHNSON   
GLUCOSE, POC Collection Time: 04/20/21  9:42 PM  
Result Value Ref Range Glucose (POC) 136 (H) 65 - 100 mg/dL Performed by Colleen Cruz LD Collection Time: 04/21/21  1:17 AM  
Result Value Ref Range  81 - 246 U/L  
PROTHROMBIN TIME + INR Collection Time: 04/21/21  1:17 AM  
Result Value Ref Range INR 2.1 (H) 0.9 - 1.1 Prothrombin time 21.0 (H) 9.0 - 11.1 sec PROCALCITONIN Collection Time: 04/21/21  1:17 AM  
Result Value Ref Range Procalcitonin 0.23 ng/mL NT-PRO BNP Collection Time: 04/21/21  1:17 AM  
Result Value Ref Range NT pro-BNP 9,473 (H) <125 PG/ML  
LACTIC ACID Collection Time: 04/21/21  1:17 AM  
Result Value Ref Range Lactic acid 0.5 0.4 - 2.0 MMOL/L  
SAMPLES BEING HELD Collection Time: 04/21/21  1:17 AM  
Result Value Ref Range SAMPLES BEING HELD 1LAV   
 COMMENT Add-on orders for these samples will be processed based on acceptable specimen integrity and analyte stability, which may vary by analyte. METABOLIC PANEL, COMPREHENSIVE Collection Time: 04/21/21  1:17 AM  
Result Value Ref Range Sodium 137 136 - 145 mmol/L Potassium 4.0 3.5 - 5.1 mmol/L Chloride 99 97 - 108 mmol/L  
 CO2 31 21 - 32 mmol/L Anion gap 7 5 - 15 mmol/L Glucose 84 65 - 100 mg/dL BUN 42 (H) 6 - 20 MG/DL Creatinine 2.77 (H) 0.55 - 1.02 MG/DL  
 BUN/Creatinine ratio 15 12 - 20 GFR est AA 21 (L) >60 ml/min/1.73m2 GFR est non-AA 17 (L) >60 ml/min/1.73m2 Calcium 9.0 8.5 - 10.1 MG/DL Bilirubin, total 0.6 0.2 - 1.0 MG/DL  
 ALT (SGPT) 7 (L) 12 - 78 U/L  
 AST (SGOT) 16 15 - 37 U/L Alk. phosphatase 54 45 - 117 U/L Protein, total 7.8 6.4 - 8.2 g/dL Albumin 3.4 (L) 3.5 - 5.0 g/dL Globulin 4.4 (H) 2.0 - 4.0 g/dL A-G Ratio 0.8 (L) 1.1 - 2.2 MAGNESIUM Collection Time: 04/21/21  1:17 AM  
Result Value Ref Range Magnesium 2.3 1.6 - 2.4 mg/dL GLUCOSE, POC Collection Time: 04/21/21  6:48 AM  
Result Value Ref Range Glucose (POC) 89 65 - 100 mg/dL Performed by Serene Guallpa I have reviewed the flowsheets. Chart and Pertinent Notes have been reviewed. No change in PMH ,family and social history from Consult note.  
 
 
Alysia Colby MD

## 2021-04-21 NOTE — PROGRESS NOTES
[facility] [place] [address] [address], [address] W: [phone]  F: [phone] Reason for Consult:  
Celestina Chester is a 78 y.o. female who is seen in hospital as a new patient at the request of TEA Walter for evaluation of pancytopenia. History of Present Illness:  
Patient is a 78 y.o. female who was admitted 4/16/21 from [address] [place] [address] with worsening shortness of breath. During hospitalization, labs significant for chronic anemia, thrombocytopenia, and leukopenia. ID consulted for leukopenia. Notes reviewed and patient with chronic sternal wound infection on Keflex. Iron profile shows iron deficiency. Patient states long hx of anemia. She has a mild nose bleed while I am at the bedside and she states she has not previously had nose bleeds. She denies hemoptysis, hematemesis, melena, hematochezia, hematuria. She states she takes PO iron and her stool is black due to this. She states her shortness of breath is much improved since admission. She denies fevers, chills, cough, nausea/vomiting, constipation/diarrhea. Past Medical History:  
Diagnosis Date  Asthma  Cancer ([facility] [address] breast  
 Cancer ([facility] [address]   
 endometrial  
 Congestive heart failure, unspecified  CRI (chronic renal insufficiency)  Depression  Diabetes ([facility] [address]  Hypertension Past Surgical History:  
Procedure Laterality Date  HX HERNIA REPAIR    
 HX HYSTERECTOMY  HX MASTECTOMY Social History Tobacco Use  Smoking status: Never Smoker  Smokeless tobacco: Never Used Substance Use Topics  Alcohol use: Not Currently No family history on file. Current Facility-Administered Medications Medication Dose Route Frequency  albumin human 25% (BUMINATE) solution 12.5 g  12.5 g IntraVENous Q6H  
 epoetin amalia-epbx (RETACRIT) injection 20,000 Units  20,000 Units SubCUTAneous Q MON, WED & FRI  warfarin (COUMADIN) tablet 4 mg  4 mg Oral ONCE  
 sodium chloride (OCEAN) 0.65 % nasal squeeze bottle 2 Spray  2 Spray Both Nostrils Q2H PRN  
 bumetanide (BUMEX) 0.25 mg/mL infusion  1 mg/hr IntraVENous CONTINUOUS  therapeutic multivitamin (THERAGRAN) tablet 1 Tab  1 Tab Oral DAILY  polyethylene glycol (MIRALAX) packet 17 g  17 g Oral DAILY  levothyroxine (SYNTHROID) tablet 25 mcg  25 mcg Oral 6am  
 isosorbide mononitrate ER (IMDUR) tablet 30 mg  30 mg Oral DAILY  cephALEXin (KEFLEX) capsule 250 mg  250 mg Oral BID  sodium chloride (NS) flush 5-40 mL  5-40 mL IntraVENous Q8H  
 sodium chloride (NS) flush 5-40 mL  5-40 mL IntraVENous PRN  
 acetaminophen (TYLENOL) tablet 650 mg  650 mg Oral Q4H PRN  
 hydrALAZINE (APRESOLINE) 20 mg/mL injection 10 mg  10 mg IntraVENous Q4H PRN  
 albuterol (PROVENTIL VENTOLIN) nebulizer solution 2.5 mg  2.5 mg Nebulization Q4H PRN  
 arformoterol 15 mcg/budesonide 0.5 mg neb solution   Nebulization BID RT  
 hydrALAZINE (APRESOLINE) tablet 100 mg  100 mg Oral TID  hydrOXYzine HCL (ATARAX) tablet 10 mg  10 mg Oral TID PRN  
 insulin glargine (LANTUS) injection 20 Units  20 Units SubCUTAneous BID  PARoxetine (PAXIL) tablet 20 mg  20 mg Oral DAILY  ranolazine ER (RANEXA) tablet 1,000 mg  1,000 mg Oral BID  tafamidis cap 61 mg (Patient Supplied)  61 mg Oral DAILY  warfarin dosing per pharmacy   Other Rx Dosing/Monitoring  miconazole (MICOTIN) 2 % powder   Topical BID Allergies Allergen Reactions  Benzodiazepines Other (comments) Respiratory issues Review of Systems: A complete review of systems was obtained, negative except as described above. Physical Exam:  
 
Visit Vitals BP (!) 80/0 Pulse 90 Temp 97.9 °F (36.6 °C) Resp 20 Ht 5' 7\" (1.702 m) Wt 261 lb 0.4 oz (118.4 kg) SpO2 90% BMI 40.88 kg/m² ECOG PS: 2-3 General: No distress Eyes: PERRL, anicteric sclerae HENT: Atraumatic, OP clear Neck: Supple Respiratory: expiratory wheeze, normal respiratory effort, supplemental oxygen CV: LVAD, non-pitting peripheral edema GI: Soft, nontender, obese MS: moves all extremities, Digits without clubbing or cyanosis. Skin: discoloration to bilateral LE, Normal temperature, turgor, and texture. Psych: Alert, oriented, appropriate affect, normal judgment/insight Results:  
 
Lab Results Component Value Date/Time WBC 3.3 (L) 04/21/2021 10:17 AM  
 HGB 8.0 (L) 04/21/2021 10:17 AM  
 HCT 27.2 (L) 04/21/2021 10:17 AM  
 PLATELET 559 (L) 85/98/0902 10:17 AM  
 MCV 88.3 04/21/2021 10:17 AM  
 ABS. NEUTROPHILS 2.5 04/18/2021 05:47 AM  
 
Lab Results Component Value Date/Time Sodium 134 (L) 04/21/2021 01:58 PM  
 Potassium 4.0 04/21/2021 01:58 PM  
 Chloride 97 04/21/2021 01:58 PM  
 CO2 34 (H) 04/21/2021 01:58 PM  
 Glucose 135 (H) 04/21/2021 01:58 PM  
 BUN 44 (H) 04/21/2021 01:58 PM  
 Creatinine 2.94 (H) 04/21/2021 01:58 PM  
 GFR est AA 19 (L) 04/21/2021 01:58 PM  
 GFR est non-AA 16 (L) 04/21/2021 01:58 PM  
 Calcium 9.2 04/21/2021 01:58 PM  
 Glucose (POC) 199 (H) 04/21/2021 04:13 PM  
 
Lab Results Component Value Date/Time Bilirubin, total 0.6 04/21/2021 01:17 AM  
 ALT (SGPT) 7 (L) 04/21/2021 01:17 AM  
 Alk. phosphatase 54 04/21/2021 01:17 AM  
 Protein, total 7.8 04/21/2021 01:17 AM  
 Albumin 3.9 04/21/2021 01:58 PM  
 Globulin 4.4 (H) 04/21/2021 01:17 AM  
 
4/21/21 US Abd  
IMPRESSION Cholelithiasis. Records reviewed and summarized above. Radiology report(s) reviewed above. Assessment/Plan:  
1) Leukopenia Records reviewed; normal WBC in the past 
Patient is afebrile and per ID no active infections. HIV, peripheral smear ordered Medications reviewed. Ranexa and Keflex can cause leukopenia/neutropenia. Leukopenia is not severe. Reviewed ID note; we can continue to monitor counts or if appropriate cant stop Keflex. I will leave this decision up to the primary team.  
Recommend CBC w. Diff daily.   
 
2) Chronic Anemia Most likely multifactorial related to CHF, CKD and iron deficiency Iron profile 4/21 with iron sat 10% and Iron 25 
IV iron ordered by cardiology; Retacrit per Nephrology Recommend GI consult to eval for source of bleeding which is causing iron deficiency. Recommend transfusion for Hgb < 7 or per Cardiology 3) Thrombocytopenia mild Records reviewed; Plts have fluctuated in the past.  
Plt 143 today and appears to be trending up. Hepatitis panel, HIV ordered. Abd US shows normal spleen and liver. Medications reviewed. Ranexa, Keflex and Imdur can cause thrombocytopenia. Due to slow improvement, believe it is reasonable to monitor for now. 4) ROCIO on CKD Nephrology following 5) CHF LVAD in place Cardiology and Cardiothoracic Surgery following 6) Chronic LVAD Infection? Per ID 7) Hx Breast Cancer (1992) Seen at [facility] 
 
8) Hx of Endometrial Cancer (2002) Seen at [facility] Will follow. Call for questions. This patient was seen in conjunction with DALTON Goel NP. I personally performed a face to face diagnostic evaluation on this patient. I personally reviewed the history and performed the key points on the exam.  
I personally reviewed all points in the assessment and created treatment plan with the patient. Specifically pt seen by me while in bed Reports long hx of anemia/ etiology likely multifactorial.  
Renal seeing pt. ? Need for EPO level given chronic RF. Labs ordered as above Transfuse as needed We will follow labs I appreciate the opportunity to participate in Ms. Zheng shepherd.  
 
Signed By: José Weinstein,

## 2021-04-21 NOTE — PROGRESS NOTES
ID Progress Note 2021 Subjective:  
 
Sitting up in chair, eating breakfast 
No complaints this morning Review of Systems: 
         
Symptom Y/N Comments   Symptom Y/N Comments Fever/Chills n      Chest Pain  n     
Poor Appetite       Edema Cough       Abdominal Pain Sputum       Joint Pain SOB/HAIRSTON y      Pruritis/Rash Nausea/vomit  n     Tolerating PT/OT Diarrhea  n     Tolerating Diet Constipation  n     Other Could NOT obtain due to:    
 
Objective:  
 
Vitals:  
Visit Vitals BP (!) 80/0 Pulse 85 Temp 98 °F (36.7 °C) Resp 18 Ht 5' 7\" (1.702 m) Wt 118.4 kg (261 lb 0.4 oz) SpO2 98% BMI 40.88 kg/m² Tmax:  Temp (24hrs), Av.3 °F (36.8 °C), Min:98 °F (36.7 °C), Max:98.7 °F (37.1 °C) PHYSICAL EXAM: 
General: Chronically ill appearing, Alert, cooperative, no acute distress EENT:  EOMI. Anicteric sclerae. MMM Resp:  Clear in apex with decreased breath sounds at bases, no wheezing or rales. No accessory muscle use CV:  Regular  rhythm,  trace of pitting edema, LVAD hum GI:  Soft, Non distended, Non tender. +Bowel sounds Neurologic:  Alert and oriented X 3, normal speech, Psych:   Good insight. Not anxious nor agitated Skin:  No rashes. No jaundice, Venous stasis Labs:  
Lab Results Component Value Date/Time WBC 2.9 (L) 2021 04:31 AM  
 HGB 7.5 (L) 2021 04:31 AM  
 HCT 26.0 (L) 2021 04:31 AM  
 PLATELET 306 (L)  04:31 AM  
 MCV 90.3 2021 04:31 AM  
 
Lab Results Component Value Date/Time  Sodium 136 2021 01:17 AM  
 Sodium 137 2021 01:17 AM  
 Potassium 4.1 2021 01:17 AM  
 Potassium 4.0 2021 01:17 AM  
 Chloride 99 2021 01:17 AM  
 Chloride 99 2021 01:17 AM  
 CO2 28 2021 01:17 AM  
 CO2 31 2021 01:17 AM  
 Anion gap 9 2021 01:17 AM  
 Anion gap 7 2021 01:17 AM  
 Glucose 80 2021 01:17 AM  
 Glucose 84 04/21/2021 01:17 AM  
 BUN 42 (H) 04/21/2021 01:17 AM  
 BUN 42 (H) 04/21/2021 01:17 AM  
 Creatinine 2.76 (H) 04/21/2021 01:17 AM  
 Creatinine 2.77 (H) 04/21/2021 01:17 AM  
 BUN/Creatinine ratio 15 04/21/2021 01:17 AM  
 BUN/Creatinine ratio 15 04/21/2021 01:17 AM  
 GFR est AA 21 (L) 04/21/2021 01:17 AM  
 GFR est AA 21 (L) 04/21/2021 01:17 AM  
 GFR est non-AA 17 (L) 04/21/2021 01:17 AM  
 GFR est non-AA 17 (L) 04/21/2021 01:17 AM  
 Calcium 9.0 04/21/2021 01:17 AM  
 Calcium 9.0 04/21/2021 01:17 AM  
 Bilirubin, total 0.6 04/21/2021 01:17 AM  
 Alk. phosphatase 54 04/21/2021 01:17 AM  
 Protein, total 7.8 04/21/2021 01:17 AM  
 Albumin 3.4 (L) 04/21/2021 01:17 AM  
 Albumin 3.4 (L) 04/21/2021 01:17 AM  
 Globulin 4.4 (H) 04/21/2021 01:17 AM  
 A-G Ratio 0.8 (L) 04/21/2021 01:17 AM  
 ALT (SGPT) 7 (L) 04/21/2021 01:17 AM  
 
 
 
Assessment and Plan Elevated sed rate - trending down 59 (4/18) from 71 on admission Afebrile Wbc 2.9 (4/20) no am lab to review Continue with keflex for now;  
   -> keflex was started as suppressive therapy for chronic sternal wound infection which has been completely healed. No active source of infection at this time. It is OK to stop the ABX if hematologist contribute leukopneia is secondary to beta lactam. 
      Will continue to follow along during this hospitalization. 
  
 Fever work up if temp >= 100.4 
  
Pancytopenia 
- hematologist has been consulted  
  
Acute on chronic systolic heart failure - cardiology following ROCIO on CKD 
- nephrology following Oly Walker NP

## 2021-04-21 NOTE — PROGRESS NOTES
Pharmacist Note  Warfarin Dosing Consult provided for this 78 y. o.female to manage warfarin for LVAD INR Goal: 1.8-2.5 per NP (lowered due to GIB) Home regimen/ tablet size: 4 mg daily per most recent prescription Drugs that may increase INR: None Drugs that may decrease INR: None Other current anticoagulants/ drugs that may increase bleeding risk: None Risk factors: Age > 72 Daily INR ordered: YES Recent Labs  
  04/21/21 
0117 04/20/21 
0431 04/19/21 
0432 04/19/21 
8422 HGB  --  7.5* 7.6*  --   
INR 2.1* 2.0*  --  1.8* Date               INR                  Dose 4/16  1.6  4 mg 4/17  1.8  4 mg 4/18  1.8  4 mg 4/19  1.8  4 mg  
4/20                 2.0                  4 mg 
4/21                 2.1                  4 mg Assessment/ Plan: Will order warfarin 4 mg PO x 1 dose. Pharmacy will continue to monitor daily and adjust therapy as indicated. Please contact the pharmacist at  for outpatient recommendations if needed.

## 2021-04-21 NOTE — PROGRESS NOTES
[facility] Hospitalist Progress Note Isabelle Pickens MD 
Answering service: [phone] OR [phone] from in house phone Date of Service:  2021 NAME:  Roxanne Orta :  1950 MRN:  [id number] Please note that this dictation was completed with Seniorlink, the computer voice recognition software. Quite often unanticipated grammatical, syntax, homophones, and other interpretive errors are inadvertently transcribed by the computer software. Please disregard these errors. Please excuse any errors that have escaped final proofreading. Admission Summary:  
Roxanne Orta is a 78 y.o. female with a past medical history of heart failure s/p LVAD, COPD, CKD stage 3, pulmonary HTN, Depression, HTN, and HLD who presented to F appointment with complaints of chest tightness and increasing dyspnea. She was instructed to come to [address] [place] [address] for admission for further treatment. At time of examination patient experiencing dyspnea but states she's feeling 100% better than what she felt yesterday. Hospitalist was consulted for admission.  
  
 
Interval history / Subjective:  
 
Patient seen and examined Better now , as she is on diuretic gtt Assessment & Plan: Dyspnea due to acute on Chronic systolic congestive heart failure , s/p LVAD 
- NYHA class IV  
- s/p LVAD heartMate 2 , increased speed per HF team  
- Advanced heart failure team following  
bumex restarted on  
- Cardiac diet  
tafamidis 61 mg PO daily Hydralazine 100 mg po TID and ranexa [address]  On  On bumex gtt  
  
COPD 
- PRN nebs CKD stage 3 Avoid nephrotoxic agents and worsening kidney finction  
nephro has been consulted  
  
DM  
- A1c 7.2  
- Monitor BG AC/HS  
- Continue lantus 20 units  
  
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  
  
JED - Continue home trilogy 
  
Morbid Obesity - BMI 42.29 
- Weight loss management to be followed up outpatient Depression Paroxetine H/o breast cancer (1992) · s/p bilateral mastectomy/chemo and endometrial cancer s/p hysterectomy Atrial fibrillation Warfarin New onset pancytopenia Etiology unknown at this point Keflex induced ? ? Code status: full DVT prophylaxis:  
 
Care Plan discussed with: Patient/Family Anticipated Disposition: Home w/Family Anticipated Discharge: 24 hours to 48 hours Hospital Problems  Date Reviewed: 3/16/2021 Codes Class Noted POA Dyspnea ICD-10-CM: R06.00 
ICD-9-CM: 786.09  4/16/2021 Unknown Review of Systems: A comprehensive review of systems was negative except for that written in the HPI. Vital Signs:  
 Last 24hrs VS reviewed since prior progress note. Most recent are: 
Visit Vitals BP (!) 80/0 Pulse 90 Temp 97.9 °F (36.6 °C) Resp 20 Ht 5' 7\" (1.702 m) Wt 118.4 kg (261 lb 0.4 oz) SpO2 90% BMI 40.88 kg/m² Intake/Output Summary (Last 24 hours) at 4/21/2021 1751 Last data filed at 4/21/2021 1235 Gross per 24 hour Intake 360 ml Output 3150 ml Net -2790 ml Physical Examination:  
 
I had a face to face encounter with this patient and independently examined them on 04/21/21 as outlined below: 
     
Constitutional: Acute resp distress ENT:  Oral mucosa moist, oropharynx benign. Resp: Accessory muscle use , tachypnic CV:  Regular rhythm, normal rate, no murmurs, gallops, rubs, lvad hum GI:  Soft, non distended, non tender. normoactive bowel sounds, no hepatosplenomegaly Musculoskeletal:  No edema, warm, 2+ pulses throughout Neurologic:  Moves all extremities. AAOx3, CN II-XII reviewed Data Review:  
 Review and/or order of clinical lab test 
 
 
Labs:  
 
Recent Labs  
  04/21/21 
1017 04/20/21 
0431 WBC 3.3* 2.9*  
HGB 8.0* 7.5* HCT 27.2* 26.0*  
* 121* Recent Labs  
  04/21/21 
1358 04/21/21 0117 04/20/21 0431 * 136  137 140  139  
K 4.0 4.1  4.0 4.2  4.2 CL 97 99  99 103  102 CO2 34* 28  31 29  31 BUN 44* 42*  42* 44*  45* CREA 2.94* 2.76*  2.77* 2.87*  2.90* * 80  84 104*  104* CA 9.2 9.0  9.0 8.9  8.8 MG 2.3 2.3 2.4 PHOS 3.6 3.3 4.4 Recent Labs  
  04/21/21 
1358 04/21/21 0117 04/20/21 0431 04/19/21 1998 ALT  --  7* 8* 6* AP  --  54 54 54 TBILI  --  0.6 0.5 0.5 TP  --  7.8 7.8 7.2 ALB 3.9 3.4*  3.4* 3.4*  3.5 3.6 GLOB  --  4.4* 4.4* 3.6 Recent Labs  
  04/21/21 0117 04/20/21 0431 04/19/21 
1614 INR 2.1* 2.0* 1.8* PTP 21.0* 19.8* 18.0* Recent Labs  
  04/21/21 0117 TIBC 246* PSAT 10* Lab Results Component Value Date/Time Folate 10.0 10/28/2020 03:56 AM  
  
No results for input(s): PH, PCO2, PO2 in the last 72 hours. No results for input(s): CPK, CKNDX, TROIQ in the last 72 hours. No lab exists for component: CPKMB Lab Results Component Value Date/Time Cholesterol, total 129 04/16/2021 10:40 AM  
 HDL Cholesterol 33 04/16/2021 10:40 AM  
 LDL, calculated 74 04/16/2021 10:40 AM  
 Triglyceride 110 04/16/2021 10:40 AM  
 CHOL/HDL Ratio 3.9 04/16/2021 10:40 AM  
 
Lab Results Component Value Date/Time Glucose (POC) 199 (H) 04/21/2021 04:13 PM  
 Glucose (POC) 119 (H) 04/21/2021 12:31 PM  
 Glucose (POC) 89 04/21/2021 06:48 AM  
 Glucose (POC) 136 (H) 04/20/2021 09:42 PM  
 Glucose (POC) 108 (H) 04/20/2021 04:26 PM  
 
Lab Results Component Value Date/Time  Color YELLOW/STRAW 04/18/2021 05:08 PM  
 Appearance CLEAR 04/18/2021 05:08 PM  
 Specific gravity 1.016 04/18/2021 05:08 PM  
 Specific gravity 1.013 11/09/2020 12:05 AM  
 pH (UA) 6.5 04/18/2021 05:08 PM  
 Protein 30 (A) 04/18/2021 05:08 PM  
 Glucose Negative 04/18/2021 05:08 PM  
 Ketone Negative 04/18/2021 05:08 PM  
 Bilirubin Negative 04/18/2021 05:08 PM  
 Urobilinogen 0.2 04/18/2021 05:08 PM  
 Nitrites Negative 04/18/2021 05:08 PM  
 Leukocyte Esterase SMALL (A) 04/18/2021 05:08 PM  
 Epithelial cells FEW 04/18/2021 05:08 PM  
 Bacteria Negative 04/18/2021 05:08 PM  
 WBC 0-4 04/18/2021 05:08 PM  
 RBC 0-5 04/18/2021 05:08 PM  
 
 
 
Medications Reviewed:  
 
Current Facility-Administered Medications Medication Dose Route Frequency  albumin human 25% (BUMINATE) solution 12.5 g  12.5 g IntraVENous Q6H  
 epoetin amalia-epbx (RETACRIT) injection 20,000 Units  20,000 Units SubCUTAneous Q MON, WED & FRI  sodium chloride (OCEAN) 0.65 % nasal squeeze bottle 2 Spray  2 Spray Both Nostrils Q2H PRN  
 bumetanide (BUMEX) 0.25 mg/mL infusion  1 mg/hr IntraVENous CONTINUOUS  therapeutic multivitamin (THERAGRAN) tablet 1 Tab  1 Tab Oral DAILY  polyethylene glycol (MIRALAX) packet 17 g  17 g Oral DAILY  levothyroxine (SYNTHROID) tablet 25 mcg  25 mcg Oral 6am  
 isosorbide mononitrate ER (IMDUR) tablet 30 mg  30 mg Oral DAILY  cephALEXin (KEFLEX) capsule 250 mg  250 mg Oral BID  sodium chloride (NS) flush 5-40 mL  5-40 mL IntraVENous Q8H  
 sodium chloride (NS) flush 5-40 mL  5-40 mL IntraVENous PRN  
 acetaminophen (TYLENOL) tablet 650 mg  650 mg Oral Q4H PRN  
 hydrALAZINE (APRESOLINE) 20 mg/mL injection 10 mg  10 mg IntraVENous Q4H PRN  
 albuterol (PROVENTIL VENTOLIN) nebulizer solution 2.5 mg  2.5 mg Nebulization Q4H PRN  
 arformoterol 15 mcg/budesonide 0.5 mg neb solution   Nebulization BID RT  
 hydrALAZINE (APRESOLINE) tablet 100 mg  100 mg Oral TID  hydrOXYzine HCL (ATARAX) tablet 10 mg  10 mg Oral TID PRN  
 insulin glargine (LANTUS) injection 20 Units  20 Units SubCUTAneous BID  PARoxetine (PAXIL) tablet 20 mg  20 mg Oral DAILY  ranolazine ER (RANEXA) tablet 1,000 mg  1,000 mg Oral BID  tafamidis cap 61 mg (Patient Supplied)  61 mg Oral DAILY  warfarin dosing per pharmacy   Other Rx Dosing/Monitoring  miconazole (MICOTIN) 2 % powder   Topical BID  
 
______________________________________________________________________ EXPECTED LENGTH OF STAY: 5d 7h 
ACTUAL LENGTH OF STAY:          Radha Cheatham MD

## 2021-04-21 NOTE — PROGRESS NOTES
[address] [facility] [address] [facility] in [place], [place] Inpatient Consult Progress Note Patient name: Ericka Anderson Patient : 1950 Patient MRN: [id number] Consulting MD: Edenilson Aragon MD 
Date of service: 21 CHIEF COMPLAINT: 
Dyspnea 
  
PLAN: 
· RHC  showed adequate Sal CI 3.0 but severely elevated RA pressure 24 mmHg · Nephrology consult for worsening renal function; will continue Bumex gtt x 24 hrs · ID consult for pancytopenia and persistent sed rate appreciated · Hematology consult for pancytopenia appreciated Continue increased device speed to 9600rpm due to LVEDD up to 8.3cm TTE  shows improved LVIDd, 6.68 cm with RVIDd 5.14 cm and TAPSE 1.1 cm Previously was intolerant to higher speeds due to VT; observe closely Continue Bumex gtt 1 mg/hr due to severely elevated RA pressure Intolerant to BB/ACEi/ARB/ARNI due to aTTR; on hold for now Continue tafamidis 61 mg PO daily Hydralazine 100 mg po TID, MAP above goal 70-90 mmHg in absence of palpable radial pulse   
Continue Imdur 30 mg PO daily Intolerant of spironolactone due to hyperkalemia  
Coumadin dose per pharmacy; goal INR lowered to 1.8-2.5 due to GIB Continue ranolazine, no ectopy - followed by Dr. Maldonado Been OP Antibiotics for chronic sternal wound infection per ID- continue Keflex po Check FOB due to anemia TSH 3.99; continue Synthroid 25 mcg qAM  
Use home Trilogy when napping and QHS PFTs when euvolemic  
SLP consult due to dysphagia appreciated Continue Miralax daily while aggressively diuresing WOCN consult for BLE wounds PT/OT eval 
US of L arm negative for DVT Palliative care consult appreciated; patient remains full code, AMD given to patient/daughter to complete DM management per Hospitalist  
Up to date on flu, PNA, and COVID vaccinations CM to assist with dispo planning; [facility]  
Telephone update provided to daughter, Crow Bowden 
  IMPRESSION: 
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 Loretta Loza) · C/b delayed extubation due to severe COPD 
· C/b critical illness polyneuropathy · C/b prolonged hospitalization post-LVAD, 55 days · C/b sternal wound infection s/p debridement (by Dr. Mina Roberts) s/p wound vac · C/b sacral ulcer · Would culture positive for Staph aureus, not MRSA · C/b LVAD site drainage, improved S/p CRT-ICD · ICD fired due to electrolyte imbalance (2011) H/o breast cancer (1992) · s/p bilateral mastectomy/chemo and endometrial cancer s/p hysterectomy · Lymphedema of LLE due to cancer treatment Severe COPD with FEV1 50% Depression Atrial fibrillation H/o \"two mini strokes\" S/p fall with hip facture · Right hip hemmiarthroplasty (5/23/18) by Dr. Minna Alva) · S/p removed hip hardwarare due to pain (4/15/19) COPD severe CKD, stage 3 Hyperkalemia Pulmonary hypertension Cardiac risk factors: · Morbid obesity, Body mass index is 42.29 kg/m². · DM2 insulin dependent · JED on Trilogy · HL Urinary incontinence, severe · no procedures due to anticoagulation Endometrial cancer (2002) HTN 
HL 
  
CARDIAC IMAGING: 
Echo (9/24/19) LVEF 20-25%, AV opens 1:1, no AR Echo (5/29/18) LVEF 10-%, ramps study done, report in [facility] Echo (1/9/18) ramp study done, LVEDD 7.1cm [facility] (11/9/18) 2 vessel disease with 90% OM, 80% PDA, DSA to PDA branch 
  INTERVAL HISTORY: 
RHC shows elevated RA pressure 24, Sal CI 3.0 MAPs stable Cr improved 2.76 from 2.87 
proBNP increased 9473 from 8182 INR 2.1 LVAD INTERROGATION: 
Device interrogated in person Device function normal, normal flow, no events LVAD Pump Speed (RPM): 1372 Pump Flow (LPM): 5.3 MAP: 82 
PI (Pulsitility Index): 4.5 Power: 6.2  Test: No 
Back Up  at Bedside & Labeled: No 
Power Module Test: No 
Driveline Site Care: No 
Driveline Dressing: Clean, Dry, and Intact Outpatient: No 
MAP in Therapeutic Range (Outpatient): Yes Testing Alarms Reviewed: Yes 
Back up SC speed: 9600 Back up Low Speed Limit: 9200 Emergency Equipment with Patient?: Yes Emergency procedures reviewed?: No 
Drive line site inspected?: No(site covered by dressing) Drive line intergrity inspected?: Yes Drive line dressing changed?: Yes PHYSICAL EXAM: 
Visit Vitals BP (!) 80/0 Pulse 85 Temp 98.2 °F (36.8 °C) Resp 18 Ht 5' 7\" (1.702 m) Wt 261 lb 0.4 oz (118.4 kg) SpO2 97% BMI 40.88 kg/m² General: Patient is well developed, well-nourished in no acute distress HEENT: Normocephalic and atraumatic. No scleral icterus. Pupils are equal, round and reactive to light and accomodation. No conjunctival injection. Oropharynx is clear. Neck: Supple. No evidence of thyroid enlargements or lymphadenopathy. JVD: Cannot be appreciated Lungs: Breath sounds are equal and clear bilaterally. No wheezes, rhonchi, or rales. Heart: Regular rate and rhythm with normal S1 and S2. No murmurs, gallops or rubs. Abdomen: Soft, no mass or tenderness. No organomegaly or hernia. Bowel sounds present. Genitourinary and rectal: deferred Extremities: No cyanosis, clubbing, or edema. Neurologic: No focal sensory or motor deficits are noted. Grossly intact. Psychiatric: Awake, alert an doriented x 3. Appropriate mood and affect. Skin: Warm, dry and well perfused. No lesions, nodules or rashes are noted. REVIEW OF SYSTEMS: 
General: Denies fever, night sweats.  
Ear, nose and throat: Denies difficulty hearing, sinus problems, runny nose, post-nasal drip, ringing in ears, mouth sores, loose teeth, ear pain, nosebleeds, sore throate, facial pain or numbess Cardiovascular: see above in the interval history Respiratory: Denies cough, wheezing, sputum production, hemoptysis. Gastrointestinal: Denies heartburn, constipation, intolerance to certain foods, diarrhea, abdominal pain, nausea, vomiting, difficulty swallowing, blood in stool Kidney and bladder: Denies painful urination, frequent urination, urgency, prostate problems and impotence Musculoskeletal: Denies joint pain, muscle weakness Skin and hair: Denies change in existing skin lesions, hair loss or increase, breast changes PAST MEDICAL HISTORY: 
Past Medical History:  
Diagnosis Date  Asthma  [facility] ([facility] breast  
 [facility]   
 endometrial  
 Congestive heart failure, unspecified  CRI (chronic renal insufficiency)  Depression  Diabetes [facility]  Hypertension PAST SURGICAL HISTORY: 
Past Surgical History:  
Procedure Laterality Date  HX HERNIA REPAIR    
 HX HYSTERECTOMY  HX MASTECTOMY FAMILY HISTORY: 
No family history on file. SOCIAL HISTORY: 
Social History Socioeconomic History  Marital status:  Spouse name: Not on file  Number of children: Not on file  Years of education: Not on file  Highest education level: Not on file Tobacco Use  Smoking status: Never Smoker  Smokeless tobacco: Never Used Substance and Sexual Activity  Alcohol use: Not Currently LABORATORY RESULTS: 
  
Labs Latest Ref Rng & Units 4/21/2021 4/21/2021 4/21/2021 4/20/2021 4/20/2021 4/19/2021 4/18/2021 WBC 3.6 - 11.0 K/uL 3. 3(L) - - - 2. 9(L) 3.0(L) 3. 2(L)  
RBC 3.80 - 5.20 M/uL 3.08(L) - - - 2.88(L) 2.89(L) 3.02(L) Hemoglobin 11.5 - 16.0 g/dL 8. 0(L) - - - 7. 5(L) 7. 6(L) 7. 8(L) Hematocrit 35.0 - 47.0 % 27. 2(L) - - - 26. 0(L) 25. 5(L) 26. 8(L) MCV 80.0 - 99.0 FL 88.3 - - - 90.3 88.2 88.7 Platelets 120 - 969 K/uL 143(L) - - - 121(L) 122(L) 129(L) Lymphocytes 12 - 49 % - - - - - - 13 Monocytes 5 - 13 % - - - - - - 8 Eosinophils 0 - 7 % - - - - - - 0 Basophils 0 - 1 % - - - - - - 0 Albumin 3.5 - 5.0 g/dL - 3.4(L) 3.4(L) 3.4(L) 3.5 3.6 3.6 Calcium 8.5 - 10.1 MG/DL - 9.0 9.0 8.9 8.8 8.9 9.2 Glucose 65 - 100 mg/dL - 80 84 104(H) 104(H) 125(H) 147(H) BUN 6 - 20 MG/DL - 42(H) 42(H) 44(H) 45(H) 43(H) 44(H) Creatinine 0.55 - 1.02 MG/DL - 2.76(H) 2.77(H) 2.87(H) 2.90(H) 3.02(H) 3.13(H) Sodium 136 - 145 mmol/L - 136 137 140 139 137 136 Potassium 3.5 - 5.1 mmol/L - 4.1 4.0 4.2 4.2 4.0 4.7 TSH 0.36 - 3.74 uIU/mL - - - - - - -  
LDH 81 - 246 U/L - - 168 - 172 169 173 Some recent data might be hidden Lab Results Component Value Date/Time TSH 3.99 (H) 04/17/2021 04:54 AM  
 TSH 2.980 10/01/2020 12:00 AM  
 
 
ALLERGY: 
Allergies Allergen Reactions  Benzodiazepines Other (comments) Respiratory issues CURRENT MEDICATIONS: 
 
Current Facility-Administered Medications:  
  albumin human 25% (BUMINATE) solution 12.5 g, 12.5 g, IntraVENous, Q6H, Jaspreet Mendoza MD, 12.5 g at 04/21/21 1217   epoetin amalia-epbx (RETACRIT) injection 20,000 Units, 20,000 Units, SubCUTAneous, Q MON, WED & FRI, Jaspreet Mendoza MD 
  warfarin (COUMADIN) tablet 4 mg, 4 mg, Oral, ONCE, Daren Walter, NP 
  bumetanide (BUMEX) 0.25 mg/mL infusion, 1 mg/hr, IntraVENous, CONTINUOUS, Daren Walter, NP, Last Rate: 4 mL/hr at 04/21/21 1217, 1 mg/hr at 04/21/21 1217   therapeutic multivitamin (THERAGRAN) tablet 1 Tab, 1 Tab, Oral, DAILY, Sarah Walter, NP, 1 Tab at 04/21/21 9123   polyethylene glycol (MIRALAX) packet 17 g, 17 g, Oral, DAILY, Daren Walter, NP, 17 g at 04/21/21 2154   levothyroxine (SYNTHROID) tablet 25 mcg, 25 mcg, Oral, 6am, Daren Walter, NP, 25 mcg at 04/21/21 0647 
  isosorbide mononitrate ER (IMDUR) tablet 30 mg, 30 mg, Oral, DAILY, Debbie Walter NP, 30 mg at 04/21/21 5336   cephALEXin (KEFLEX) capsule 250 mg, 250 mg, Oral, BID, Shane Del Cid MD, 250 mg at 04/21/21 8168   sodium chloride (NS) flush 5-40 mL, 5-40 mL, IntraVENous, Q8H, Dana Walter NP, 40 mL at 04/21/21 1359   sodium chloride (NS) flush 5-40 mL, 5-40 mL, IntraVENous, PRN, Debbie Walter NP, 20 mL at 04/17/21 7887   acetaminophen (TYLENOL) tablet 650 mg, 650 mg, Oral, Q4H PRN, Dillon Walter NP, 650 mg at 04/18/21 3119   hydrALAZINE (APRESOLINE) 20 mg/mL injection 10 mg, 10 mg, IntraVENous, Q4H PRN, Debbie Walter NP, 10 mg at 04/21/21 6173   albuterol (PROVENTIL VENTOLIN) nebulizer solution 2.5 mg, 2.5 mg, Nebulization, Q4H PRN, Dillon Walter NP 
  arformoterol 15 mcg/budesonide 0.5 mg neb solution, , Nebulization, BID RT, Dillon Walter NP, Given at 04/21/21 1030   hydrALAZINE (APRESOLINE) tablet 100 mg, 100 mg, Oral, TID, Debbie Walter NP, 100 mg at 04/21/21 8875   hydrOXYzine HCL (ATARAX) tablet 10 mg, 10 mg, Oral, TID PRN, Dillon Walter NP 
  insulin glargine (LANTUS) injection 20 Units, 20 Units, SubCUTAneous, BID, Dillon Walter NP, 20 Units at 04/21/21 9764   PARoxetine (PAXIL) tablet 20 mg, 20 mg, Oral, DAILY, Debbie Walter NP, 20 mg at 04/21/21 7158   ranolazine ER (RANEXA) tablet 1,000 mg, 1,000 mg, Oral, BID, Dillon Walter NP, 1,000 mg at 04/21/21 1709   tafamidis cap 61 mg (Patient Supplied), 61 mg, Oral, DAILY, Debbie Walter NP, 61 mg at 04/21/21 1003   warfarin dosing per pharmacy, , Other, Rx Dosing/Monitoring, Dillon Walter NP 
  miconazole (MICOTIN) 2 % powder, , Topical, BID, Shane Del Cid MD, Given at 04/21/21 1009 PATIENT CARE TEAM: 
Patient Care Team: 
Nick Luna NP as PCP - General (Nurse Practitioner) Alea Del Castillo MD (Cardiology) Zenaida Hu MD as Physician (Cardiology) Thank you for allowing me to participate in this patient's care. TEA Ramesh [address], [address] Phone: [phone] Fax: [phone] [facility] ATTENDING ADDENDUM Patient was seen and examined in person. Data and notes were reviewed. I have discussed and agree with the plan as noted in the NP note above without further additions. Emre Ruiz MD PhD 
Beatriz Villasenor [id number]

## 2021-04-21 NOTE — PROGRESS NOTES
[facility] VAD/Heart Failure Progress Note Admit Date: 2021 POD:  1 Day Post-Op Procedure:  Procedure(s): RIGHT HEART CATH Subjective:  
CVP elevated yesterday on RHC; continue to work on diuresis Objective:  
Vitals: 
Blood pressure (!) 80/0, pulse 85, temperature 98.2 °F (36.8 °C), resp. rate 18, height 5' 7\" (1.702 m), weight 261 lb 0.4 oz (118.4 kg), SpO2 97 %. Temp (24hrs), Av.3 °F (36.8 °C), Min:98 °F (36.7 °C), Max:98.7 °F (37.1 °C) Hemodynamics: 
 CO:   
 CI:   
 CVP:   
 SVR:   
 PAP Systolic:   
 PAP Diastolic:   
 PVR:   
 QN71:   
 SCV02:   
 
VAD Interrogation: LVAD (Heartmate) Pump Speed (RPM): 5016 Pump Flow (LPM): 5.6 Chatter in Lines: No 
PI (Pulsitility Index): 5.4 Power: 5.6 MAP: 100  Test: No 
Back Up  at Bedside & Labeled: Yes Power Module Test: No 
Driveline Site Care: No 
Driveline Dressing: Clean, Dry, and Intact EKG/Rhythm:   
 
Extubation Date / Time:  
 
CT Output:  
 
Ventilator: 
  
 
Oxygen Therapy: 
Oxygen Therapy O2 Sat (%): 97 % (21 0953) Pulse via Oximetry: 91 beats per minute (21 1002) O2 Device: Nasal cannula (21 0329) O2 Flow Rate (L/min): 2 l/min (21 0329) CXR: 
 
Admission Weight: Last Weight Weight: 270 lb 6.4 oz (122.7 kg) Weight: 261 lb 0.4 oz (118.4 kg) Intake / Output / Drain: 
Current Shift: No intake/output data recorded. Last 24 hrs.:  
 
Intake/Output Summary (Last 24 hours) at 2021 7977 Last data filed at 2021 4393 Gross per 24 hour Intake 1096.87 ml Output 4100 ml Net -3003.13 ml No results for input(s): CPK, CKMB, TROIQ in the last 72 hours. Recent Labs  
  21 
0117 21 
0431 21 
0432 21 
6471   137 140  139  --  137  
K 4.1  4.0 4.2  4.2  --  4.0  
CO2 28  31 29  31  --  30  
BUN 42*  42* 44*  45*  --  43* CREA 2.76*  2.77* 2.87*  2.90*  --  3.02* GLU 80  84 104*  104*  --  125* PHOS 3.3 4.4  --   --   
MG 2.3 2.4  --  2.4 WBC  --  2.9* 3.0*  --   
HGB  --  7.5* 7.6*  --   
HCT  --  26.0* 25.5*  --   
PLT  --  121* 122*  --   
 
Recent Labs  
  04/21/21 
0117 04/20/21 
0431 04/19/21 
0428 INR 2.1* 2.0* 1.8* PTP 21.0* 19.8* 18.0* No lab exists for component: PBNP Current Facility-Administered Medications:  
  albumin human 25% (BUMINATE) solution 12.5 g, 12.5 g, IntraVENous, Q6H, Jaspreet Mendoza MD, 12.5 g at 04/21/21 0707   epoetin amalia-epbx (RETACRIT) injection 20,000 Units, 20,000 Units, SubCUTAneous, Q MON, WED & FRI, Jaspreet Mendoza MD 
  warfarin (COUMADIN) tablet 4 mg, 4 mg, Oral, ONCE, Keila Walter NP 
  bumetanide (BUMEX) 0.25 mg/mL infusion, 1 mg/hr, IntraVENous, CONTINUOUS, Keila Walter NP, Last Rate: 4 mL/hr at 04/21/21 0800, 1 mg/hr at 04/21/21 0800   therapeutic multivitamin (THERAGRAN) tablet 1 Tab, 1 Tab, Oral, DAILY, Keila Walter NP 
  polyethylene glycol (MIRALAX) packet 17 g, 17 g, Oral, DAILY, PolliarKeila tellez, NP, 17 g at 04/20/21 1618   levothyroxine (SYNTHROID) tablet 25 mcg, 25 mcg, Oral, 6am, Keila Walter NP, 25 mcg at 04/21/21 7711 
  isosorbide mononitrate ER (IMDUR) tablet 30 mg, 30 mg, Oral, DAILY, PolliarKeila tellez, NP, 30 mg at 04/20/21 1051   cephALEXin (KEFLEX) capsule 250 mg, 250 mg, Oral, BID, Tarik Oswald MD, 250 mg at 04/20/21 1834 
  sodium chloride (NS) flush 5-40 mL, 5-40 mL, IntraVENous, Q8H, Dana Walter NP, 10 mL at 04/21/21 0649 
  sodium chloride (NS) flush 5-40 mL, 5-40 mL, IntraVENous, PRN, Keila Walter, NP, 20 mL at 04/17/21 9117   acetaminophen (TYLENOL) tablet 650 mg, 650 mg, Oral, Q4H PRN, Anabella Walter, NP, 650 mg at 04/18/21 7159   hydrALAZINE (APRESOLINE) 20 mg/mL injection 10 mg, 10 mg, IntraVENous, Q4H PRN, Keila Walter, NP, 10 mg at 04/21/21 1326   albuterol (PROVENTIL VENTOLIN) nebulizer solution 2.5 mg, 2.5 mg, Nebulization, Q4H PRN, Eric Walter NP 
  arformoterol 15 mcg/budesonide 0.5 mg neb solution, , Nebulization, BID RT, Eric Walter NP, Given at 04/20/21 2216   hydrALAZINE (APRESOLINE) tablet 100 mg, 100 mg, Oral, TID, Rosi Walter, NP, 100 mg at 04/20/21 2141   hydrOXYzine HCL (ATARAX) tablet 10 mg, 10 mg, Oral, TID PRN, Eric Walter NP 
  insulin glargine (LANTUS) injection 20 Units, 20 Units, SubCUTAneous, BID, Eric Walter NP, 20 Units at 04/20/21 2141   PARoxetine (PAXIL) tablet 20 mg, 20 mg, Oral, DAILY, Rosi Walter, TEA, 20 mg at 04/20/21 1051 
  ranolazine ER (RANEXA) tablet 1,000 mg, 1,000 mg, Oral, BID, Eric Walter NP, 1,000 mg at 04/20/21 9403   tafamidis cap 61 mg (Patient Supplied), 61 mg, Oral, DAILY, Rosi Walter NP, 61 mg at 04/20/21 1055   warfarin dosing per pharmacy, , Other, Rx Dosing/Monitoring, Eric Walter NP 
  miconazole (MICOTIN) 2 % powder, , Topical, BID, Madeleine Claros MD, Given at 04/20/21 6662 A/P 
  
S/P LVAD - good flows Need for [facility] - coumadin Fluid overload - diuresis Acute kidney injury - monitor  
  
Risk of morbidity and mortality - high Medical decision making - high complexity Signed By: Kavon Griffin MD

## 2021-04-21 NOTE — PROGRESS NOTES
1930: Bedside shift change report given to Nely CAUSEY (oncoming nurse) by Deisy Willard RN (offgoing nurse). Report included the following information SBAR, Kardex, Procedure Summary, Intake/Output, MAR and Recent Results. 0115: Pt had 10 beat run of Vtach. Pt asymtomatic. MAP 94. Labs drawn. 0330: PRN hydralazine given for .  
 
0710: Fletcher removed per Dr. Ruthell Holstein orders. 0730: Bedside shift change report given to Mau Ramos RN (oncoming nurse) by Prashant Archibald RN (offgoing nurse). Report included the following information SBAR, Kardex, Procedure Summary, Intake/Output, MAR and Recent Results.

## 2021-04-21 NOTE — PROGRESS NOTES
Problem: Falls - Risk of 
Goal: *Absence of Falls Description: Document Jerad Schimke Fall Risk and appropriate interventions in the flowsheet. 4/20/2021 2058 by Kathe Peng RN Outcome: Progressing Towards Goal 
Note: Fall Risk Interventions: 
Mobility Interventions: Bed/chair exit alarm Medication Interventions: Utilize gait belt for transfers/ambulation, Teach patient to arise slowly, Bed/chair exit alarm Elimination Interventions: Call light in reach, Bed/chair exit alarm History of Falls Interventions: Bed/chair exit alarm 4/20/2021 2058 by Kathe Peng RN Outcome: Progressing Towards Goal 
Note: Fall Risk Interventions: 
Mobility Interventions: Bed/chair exit alarm Medication Interventions: Utilize gait belt for transfers/ambulation, Teach patient to arise slowly, Bed/chair exit alarm Elimination Interventions: Call light in reach, Bed/chair exit alarm History of Falls Interventions: Bed/chair exit alarm Problem: Pressure Injury - Risk of 
Goal: *Prevention of pressure injury Description: Document Jaime Scale and appropriate interventions in the flowsheet. 4/20/2021 2058 by Kathe Peng RN Outcome: Progressing Towards Goal 
Note: Pressure Injury Interventions: 
Sensory Interventions: Minimize linen layers Moisture Interventions: Absorbent underpads, Minimize layers Activity Interventions: Increase time out of bed, PT/OT evaluation Mobility Interventions: Chair cushion, Turn and reposition approx. every two hours(pillow and wedges) Nutrition Interventions: Document food/fluid/supplement intake Friction and Shear Interventions: Feet elevated on foot rest 
 
  
 
 
 
4/20/2021 2058 by Kathe Peng RN Outcome: Progressing Towards Goal 
Note: Pressure Injury Interventions: 
Sensory Interventions: Minimize linen layers Moisture Interventions: Absorbent underpads, Minimize layers Activity Interventions: Increase time out of bed, PT/OT evaluation Mobility Interventions: Chair cushion, Turn and reposition approx. every two hours(pillow and wedges) Nutrition Interventions: Document food/fluid/supplement intake Friction and Shear Interventions: Feet elevated on foot rest 
 
  
 
 
 
  
Problem: Heart Failure: Day 4 Goal: Diagnostic Test/Procedures Outcome: Progressing Towards Goal 
Goal: Nutrition/Diet Outcome: Progressing Towards Goal 
Goal: Medications Outcome: Progressing Towards Goal 
  
Problem: Breathing Pattern - Ineffective Goal: *Absence of hypoxia Outcome: Progressing Towards Goal 
  
Problem: Nutrition Deficit Goal: *Optimize nutritional status Outcome: Progressing Towards Goal

## 2021-04-22 NOTE — PROGRESS NOTES
Problem: Self Care Deficits Care Plan (Adult) Goal: *Acute Goals and Plan of Care (Insert Text) Description: FUNCTIONAL STATUS PRIOR TO ADMISSION: Patient was modified independent using a rollator for functional mobility. Pt lives alone, was able to perform dressing with AE (reacher.) She does not wear socks and has slip on shoes with pre tied laces. Pt was sponge bathing at home due to B LE wounds. Pt independently managed her LVAD 
 
HOME SUPPORT PRIOR TO ADMISSION: The patient lived alone with daughter to provide assistance. Will be staying with daughter once discharged. Occupational Therapy Goals Initiated 4/19/2021 1. Patient will perform upper body dressing and lower body dressing using AE PRN with modified independence within 7 day(s). 2.  Patient will perform bathing with modified independence within 7 day(s). 3.  Patient will perform standing ADLs at sink with modified independence within 7 day(s). 4.  Patient will perform toilet transfers in bathroom with least restrictive DME with modified independence within 7 day(s). 5.  Patient will perform all aspects of toileting with modified independence within 7 day(s). 6.  Patient will utilize energy conservation techniques during functional activities with verbal cues within 7 day(s). Outcome: Progressing Towards Goal 
 
OCCUPATIONAL THERAPY TREATMENT Patient: Lisa Cheney (72 y.o. female) Date: 4/22/2021 Diagnosis: Dyspnea [R06.00] <principal problem not specified> Procedure(s) (LRB): 
RIGHT HEART CATH (N/A) 2 Days Post-Op Precautions: (LVAD) Chart, occupational therapy assessment, plan of care, and goals were reviewed. ASSESSMENT Patient continues with skilled OT services and is progressing towards goals. Pt limited today by generalized weakness, decreased standing tolerance, \"jumpiness\" in extremities (LEs> UEs), need for 1.5L and decline in fine motor skills.  Pt required min A to stand with rollator for about 1 minute for marching in place before needing seated rest break. Issued pt pink resistance sponge and yellow theraputty to assist with fine motor strengthening in prep for LVAD management. VSS with activity, although pt HAIRSTON. At this time concerned about pt being able to manage herself and her LVAD at home alone. Pt will likely need SNF rehab. Current Level of Function Impacting Discharge (ADLs): min A x 1 to stand, decreased activity tolerance, fall risk, decreased FM Other factors to consider for discharge: from home alone PLAN : 
Patient continues to benefit from skilled intervention to address the above impairments. Continue treatment per established plan of care to address goals. Recommend with staff: OOB to chair with A x 2 and rollator Recommend next OT session: dressing ADLs, bathroom mobility as able Recommendation for discharge: (in order for the patient to meet his/her long term goals) Therapy up to 5 days/week in SNF setting This discharge recommendation: 
Has been made in collaboration with the attending provider and/or case management IF patient discharges home will need the following DME: TBD SUBJECTIVE:  
Patient stated my legs are really jumping.  OBJECTIVE DATA SUMMARY:  
Cognitive/Behavioral Status: 
Neurologic State: Alert; Appropriate for age Orientation Level: Oriented X4 Cognition: Appropriate decision making; Appropriate for age attention/concentration; Appropriate safety awareness; Follows commands;Recognition of people/places Perception: Appears intact Perseveration: No perseveration noted Safety/Judgement: Awareness of environment Functional Mobility and Transfers for ADLs: 
Bed Mobility: 
  
RN assisted pt OOB earlier, reported pt needing max A x 2 Transfers: 
Sit to Stand: Minimum assistance; Additional time; Adaptive equipment;Assist x1 Balance: 
Sitting: Intact Standing: Impaired; With support Standing - Static: Constant support; José Miguel Mosqueda Standing - Dynamic : Fair ADL Intervention: 
  
 
  
 
  
 
  
 
  
 
  
 
  
 
Cognitive Retraining Safety/Judgement: Awareness of environment Therapeutic Exercises:  
Issued pink resistance sponge and yellow theraputty. Reviewed exercises Pain: 
none Activity Tolerance:  
Fair and requires rest breaks After treatment patient left in no apparent distress:  
Sitting in chair and Call bell within reach COMMUNICATION/COLLABORATION:  
The patients plan of care was discussed with: Physical therapist and Registered nurse. Jem Loco OT Time Calculation: 17 mins

## 2021-04-22 NOTE — PROGRESS NOTES
[address] [facility] [place] [address] [facility] [place] in [place], [place] Inpatient Consult Progress Note Patient name: Ki Hsu Patient : 1950 Patient MRN: [id number] Consulting MD: Cheyenne Monteiro MD 
Date of service: 21 CHIEF COMPLAINT: 
Dyspnea 
  
PLAN: 
· RHC  showed adequate Sal CI 3.0 but severely elevated RA pressure 24 mmHg · Nephrology consult for worsening renal function; Bumex gtt transitioned to intermittent diuretics · ID consult for pancytopenia and persistent sed rate appreciated; ?alterantive abx agent - recommend against stopping antibiotics altogether due to chronic SWI/suspected pump infection and high risk of clinical deterioration if infection is not suppressed · Hematology consult for pancytopenia appreciated Continue increased device speed to 9600rpm due to LVEDD up to 8.3cm TTE  shows improved LVIDd, 6.68 cm with RVIDd 5.14 cm and TAPSE 1.1 cm Previously was intolerant to higher speeds due to VT; observe closely Bumex 2 mg IV TID; strict I/O Per nephrology, no acute indication for RRT Intolerant to BB/ACEi/ARB/ARNI due to aTTR Continue tafamidis 61 mg PO daily Hydralazine 100 mg po TID, MAP above goal 70-90 mmHg in absence of palpable radial pulse   
Continue Imdur 30 mg PO daily Intolerant of spironolactone due to hyperkalemia  
Coumadin dose per pharmacy; goal INR lowered to 1.8-2.5 due to GIB Continue ranolazine, no ectopy - followed by Dr. Riddle Hamper OP Antibiotics for chronic sternal wound infection per ID - continue Keflex po Check FOB due to anemia TSH 3.99; continue Synthroid 25 mcg qAM  
Use home Trilogy when napping and QHS PFTs when euvolemic  
SLP consult due to dysphagia appreciated Continue Miralax daily while aggressively diuresing WOCN consult for BLE wounds PT/OT eval 
Palliative care consult appreciated; patient remains full code, AMD given to patient/daughter to complete DM management per Hospitalist  
Up to date on flu, PNA, and COVID vaccinations CM to assist with dispo planning; [facility]  
Telephone update provided to daughter, Sahil Bernal IMPRESSION: 
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 Thuy Redding) · C/b delayed extubation due to severe COPD 
· C/b critical illness polyneuropathy · C/b prolonged hospitalization post-LVAD, 55 days · C/b sternal wound infection s/p debridement (by Dr. Derrell Jose) s/p wound vac · C/b sacral ulcer · Would culture positive for Staph aureus, not MRSA · C/b LVAD site drainage, improved S/p CRT-ICD · ICD fired due to electrolyte imbalance (2011) H/o breast cancer (1992) · s/p bilateral mastectomy/chemo and endometrial cancer s/p hysterectomy · Lymphedema of LLE due to cancer treatment Severe COPD with FEV1 50% Depression Atrial fibrillation H/o \"two mini strokes\" S/p fall with hip facture · Right hip hemmiarthroplasty (5/23/18) by Dr. Adán Roger) · S/p removed hip hardwarare due to pain (4/15/19) COPD severe CKD, stage 3 Hyperkalemia, resolved Pulmonary hypertension Cardiac risk factors: · Morbid obesity, Body mass index is 42.29 kg/m². · DM2 insulin dependent · JED on Trilogy · HL Urinary incontinence, severe · no procedures due to anticoagulation Endometrial cancer (2002) HTN 
HL 
  
CARDIAC IMAGING: 
Echo (9/24/19) LVEF 20-25%, AV opens 1:1, no AR Echo (5/29/18) LVEF 10-%, ramps study done, report in [facility] Echo (1/9/18) ramp study done, LVEDD 7.1cm 
[facility] (11/9/18) 2 vessel disease with 90% OM, 80% PDA, DSA to PDA branch 
  INTERVAL HISTORY: 
I/O inaccurate due to incontinence Weight + 2 lbs MAPs stable Cr 3.28 from 3.38 
proBNP increased 98237 from 9473 INR 2.2 LVAD INTERROGATION: 
Device interrogated in person Device function normal, normal flow, no events LVAD Pump Speed (RPM): 9965 Pump Flow (LPM): 5.6 MAP: 94 
PI (Pulsitility Index): 5.5 Power: 6.3  Test: No 
Back Up  at Bedside & Labeled: Yes Power Module Test: No 
Driveline Site Care: No 
Driveline Dressing: Clean, Dry, and Intact Outpatient: No 
MAP in Therapeutic Range (Outpatient): Yes Testing Alarms Reviewed: Yes 
Back up SC speed: 9600 Back up Low Speed Limit: 9200 Emergency Equipment with Patient?: Yes Emergency procedures reviewed?: Yes Drive line site inspected?: No 
Drive line intergrity inspected?: Yes Drive line dressing changed?: No 
 
PHYSICAL EXAM: 
Visit Vitals BP (!) 80/0 Pulse 81 Temp 98.3 °F (36.8 °C) Resp 22 Ht 5' 7\" (1.702 m) Wt 263 lb 14.3 oz (119.7 kg) SpO2 99% BMI 41.33 kg/m² General: Patient is well developed, well-nourished in no acute distress HEENT: Normocephalic and atraumatic. No scleral icterus. Pupils are equal, round and reactive to light and accomodation. No conjunctival injection. Oropharynx is clear. Neck: Supple. No evidence of thyroid enlargements or lymphadenopathy. JVD: Cannot be appreciated Lungs: Breath sounds are equal and clear bilaterally. No wheezes, rhonchi, or rales. Heart: Regular rate and rhythm with normal S1 and S2. No murmurs, gallops or rubs. Abdomen: Soft, no mass or tenderness. No organomegaly or hernia. Bowel sounds present. Genitourinary and rectal: deferred Extremities: BLE + venous stasis Neurologic: No focal sensory or motor deficits are noted. Grossly intact. Psychiatric: Awake, alert an doriented x 3. Appropriate mood and affect.  
Skin: Warm, dry and well perfused. No lesions, nodules or rashes are noted. REVIEW OF SYSTEMS: 
General: Denies fever, night sweats. Ear, nose and throat: Denies difficulty hearing, sinus problems, runny nose, post-nasal drip, ringing in ears, mouth sores, loose teeth, ear pain, nosebleeds, sore throate, facial pain or numbess Cardiovascular: see above in the interval history Respiratory: Denies cough, wheezing, sputum production, hemoptysis. Gastrointestinal: Denies heartburn, constipation, intolerance to certain foods, diarrhea, abdominal pain, nausea, vomiting, difficulty swallowing, blood in stool Kidney and bladder: Denies painful urination, frequent urination, urgency, prostate problems and impotence Musculoskeletal: Denies joint pain, muscle weakness Skin and hair: Denies change in existing skin lesions, hair loss or increase, breast changes PAST MEDICAL HISTORY: 
Past Medical History:  
Diagnosis Date  Asthma  [facility]  
 Cancer ([facility]   
 endometrial  
 Congestive heart failure, unspecified  CRI (chronic renal insufficiency)  Depression  Diabetes ([facility]  Hypertension PAST SURGICAL HISTORY: 
Past Surgical History:  
Procedure Laterality Date  HX HERNIA REPAIR    
 HX HYSTERECTOMY  HX MASTECTOMY FAMILY HISTORY: 
No family history on file. SOCIAL HISTORY: 
Social History Socioeconomic History  Marital status:  Spouse name: Not on file  Number of children: Not on file  Years of education: Not on file  Highest education level: Not on file Tobacco Use  Smoking status: Never Smoker  Smokeless tobacco: Never Used Substance and Sexual Activity  Alcohol use: Not Currently LABORATORY RESULTS: 
  
Labs Latest Ref Rng & Units 4/22/2021 4/22/2021 4/22/2021 4/21/2021 4/21/2021 4/21/2021 4/21/2021 WBC 3.6 - 11.0 K/uL 3.2(L) - - - 3. 3(L) - -  
RBC 3.80 - 5.20 M/uL 2.97(L) - - - 3.08(L) - - Hemoglobin 11.5 - 16.0 g/dL 7. 7(L) - - - 8. 0(L) - - Hematocrit 35.0 - 47.0 % 26. 4(L) - - - 27. 2(L) - - MCV 80.0 - 99.0 FL 88.9 - - - 88.3 - - Platelets 351 - 441 K/uL 128(L) - - - 143(L) - - Lymphocytes 12 - 49 % - - - - - - - Monocytes 5 - 13 % - - - - - - - Eosinophils 0 - 7 % - - - - - - - Basophils 0 - 1 % - - - - - - - Albumin 3.5 - 5.0 g/dL - 4.0 4.0 3.9 - 3.4(L) 3.4(L) Calcium 8.5 - 10.1 MG/DL - 9.0 8.9 9.2 - 9.0 9.0 Glucose 65 - 100 mg/dL - 110(H) 113(H) 135(H) - 80 84 BUN 6 - 20 MG/DL - 50(H) 50(H) 44(H) - 42(H) 42(H) Creatinine 0.55 - 1.02 MG/DL - 3.28(H) 3.38(H) 2.94(H) - 2.76(H) 2.77(H) Sodium 136 - 145 mmol/L - 137 137 134(L) - 136 137 Potassium 3.5 - 5.1 mmol/L - 4.1 4.1 4.0 - 4.1 4.0 TSH 0.36 - 3.74 uIU/mL - - - - - - -  
LDH 81 - 246 U/L - - 176 - - - 168 Some recent data might be hidden Lab Results Component Value Date/Time TSH 3.99 (H) 04/17/2021 04:54 AM  
 TSH 2.980 10/01/2020 12:00 AM  
 
 
ALLERGY: 
Allergies Allergen Reactions  Benzodiazepines Other (comments) Respiratory issues CURRENT MEDICATIONS: 
 
Current Facility-Administered Medications:  
  warfarin (COUMADIN) tablet 4 mg, 4 mg, Oral, ONCE, Donato Walter NP 
  bumetanide (BUMEX) injection 2 mg, 2 mg, IntraVENous, TID, Alissa Jones MD 
  epoetin amalia-epbx (RETACRIT) injection 20,000 Units, 20,000 Units, SubCUTAneous, Q MON, WED & Edwardo Mensah MD, 20,000 Units at 04/21/21 2106   sodium chloride (OCEAN) 0.65 % nasal squeeze bottle 2 Spray, 2 Spray, Both Nostrils, Q2H PRN, Betito Walter NP 
  therapeutic multivitamin (THERAGRAN) tablet 1 Tab, 1 Tab, Oral, DAILY, Betito Walter NP, 1 Tab at 04/22/21 4227   polyethylene glycol (MIRALAX) packet 17 g, 17 g, Oral, DAILY, Donato Walter NP, 17 g at 04/22/21 2188   levothyroxine (SYNTHROID) tablet 25 mcg, 25 mcg, Oral, 6am, Betito Walter NP, 25 mcg at 04/22/21 2960   isosorbide mononitrate ER (IMDUR) tablet 30 mg, 30 mg, Oral, DAILY, Polliard, Judy Fang T, NP, 30 mg at 04/22/21 4051   cephALEXin (KEFLEX) capsule 250 mg, 250 mg, Oral, BID, Shella Barthel, MD, 250 mg at 04/22/21 7712   sodium chloride (NS) flush 5-40 mL, 5-40 mL, IntraVENous, Q8H, Polliard, Judy Fang T, NP, 10 mL at 04/22/21 5193   sodium chloride (NS) flush 5-40 mL, 5-40 mL, IntraVENous, PRN, Polliard, Judy Fang T, NP, 10 mL at 04/22/21 3946   acetaminophen (TYLENOL) tablet 650 mg, 650 mg, Oral, Q4H PRN, Luann Walter, NP, 650 mg at 04/18/21 4534   hydrALAZINE (APRESOLINE) 20 mg/mL injection 10 mg, 10 mg, IntraVENous, Q4H PRN, Jose Angel, Judy Marcosg T, NP, 10 mg at 04/21/21 5110   albuterol (PROVENTIL VENTOLIN) nebulizer solution 2.5 mg, 2.5 mg, Nebulization, Q4H PRN, Luann Walter, NP 
  arformoterol 15 mcg/budesonide 0.5 mg neb solution, , Nebulization, BID RT, Luann Walter, NP, Given at 04/22/21 2051   hydrALAZINE (APRESOLINE) tablet 100 mg, 100 mg, Oral, TID, MatdJudy, NP, 100 mg at 04/22/21 3068   hydrOXYzine HCL (ATARAX) tablet 10 mg, 10 mg, Oral, TID PRN, Luann Walter Manus, NP 
  insulin glargine (LANTUS) injection 20 Units, 20 Units, SubCUTAneous, BID, Luann Walter NP, 20 Units at 04/22/21 7725   PARoxetine (PAXIL) tablet 20 mg, 20 mg, Oral, DAILY, Polliard, Judy Marcosg T, NP, 20 mg at 04/22/21 1547   ranolazine ER (RANEXA) tablet 1,000 mg, 1,000 mg, Oral, BID, Polliard, Judy Fang T, NP, 1,000 mg at 04/22/21 9449   tafamidis cap 61 mg (Patient Supplied), 61 mg, Oral, DAILY, Luann Walter NP, 61 mg at 04/22/21 7896   warfarin dosing per pharmacy, , Other, Rx Dosing/Monitoring, Luann Walter NP 
  miconazole (MICOTIN) 2 % powder, , Topical, BID, Shella Barthel, MD, Given at 04/22/21 5496 PATIENT CARE TEAM: 
Patient Care Team: 
Toy Alpers, NP as PCP - General (Nurse Practitioner) Mary Brown MD (Cardiology) Gio Newsome MD as Physician (Cardiology) Thank you for allowing me to participate in this patient's care. TEA Vitale [address], [address] Phone: [phone] Fax: [phone] [facility] ATTENDING ADDENDUM Patient was seen and examined in person. Data and notes were reviewed. I have discussed and agree with the plan as noted in the NP note above without further additions. Jg Lopez MD PhD 
Beatriz Villasenor [id number]

## 2021-04-22 NOTE — PROGRESS NOTES
Pharmacist Note  Warfarin Dosing Consult provided for this 78 y. o.female to manage warfarin for LVAD INR Goal: 1.8-2.5 per NP (lowered due to GIB) Home regimen/ tablet size: 4 mg daily per most recent prescription Drugs that may increase INR: None Drugs that may decrease INR: None Other current anticoagulants/ drugs that may increase bleeding risk: None Risk factors: Age > 72 Daily INR ordered: YES Recent Labs  
  04/22/21 
5478 04/22/21 
0258 04/21/21 
1017 04/21/21 
0117 04/20/21 
0431 HGB 7.7*  --  8.0*  --  7.5* INR  --  2.2*  --  2.1* 2.0* Date               INR                  Dose 4/16  1.6  4 mg 4/17  1.8  4 mg 4/18  1.8  4 mg 4/19  1.8  4 mg  
4/20                 2.0                  4 mg 
4/21                 2.1                  4 mg 
4/22                 2.2                   4 mg Assessment/ Plan: Will order warfarin 4 mg PO x 1 dose. Pharmacy will continue to monitor daily and adjust therapy as indicated. Please contact the pharmacist at  for outpatient recommendations if needed.

## 2021-04-22 NOTE — PROGRESS NOTES
Problem: Mobility Impaired (Adult and Pediatric) Goal: *Acute Goals and Plan of Care (Insert Text) Description: FUNCTIONAL STATUS PRIOR TO ADMISSION: Patient was modified independent using a rollator for functional mobility. HOME SUPPORT PRIOR TO ADMISSION: The patient lived alone with daughter to provide assistance. Will be staying with daughter once discharged. Physical Therapy Goals Initiated 4/17/2021 1. Patient will move from supine to sit and sit to supine  and scoot up and down in bed with modified independence within 7 day(s). 2.  Patient will transfer from bed to chair and chair to bed with modified independence using the least restrictive device within 7 day(s). 3.  Patient will perform sit to stand with modified independence within 7 day(s). 4.  Patient will ambulate with modified independence for 150 feet with the least restrictive device within 7 day(s). Outcome: Progressing Towards Goal'' PHYSICAL THERAPY TREATMENT Patient: [place] (66 y.o. female) Date: 4/22/2021 Diagnosis: Dyspnea [R06.00] <principal problem not specified> Procedure(s) (LRB): 
RIGHT HEART CATH (N/A) 2 Days Post-Op Precautions: (LVAD) Chart, physical therapy assessment, plan of care and goals were reviewed. ASSESSMENT Patient continues with skilled PT services and is progressing towards goals. Pt reporting increase in \"jerky\" movement in UE and LE. Pt hesitent to ambulate due to the unpredictability. Pt also reports fatigue. Current Level of Function Impacting Discharge (mobility/balance): Min A Other factors to consider for discharge: decrease mobility PLAN : 
Patient continues to benefit from skilled intervention to address the above impairments. Continue treatment per established plan of care. to address goals. Recommendation for discharge: (in order for the patient to meet his/her long term goals) To be determined: HHPT with assistance vs rehab This discharge recommendation: 
Has not yet been discussed the attending provider and/or case management IF patient discharges home will need the following DME: patient owns DME required for discharge SUBJECTIVE:  
Patient stated my body does what it wants.  OBJECTIVE DATA SUMMARY:  
Critical Behavior: 
Neurologic State: Alert, Appropriate for age Orientation Level: Oriented X4 Cognition: Appropriate decision making, Appropriate for age attention/concentration, Appropriate safety awareness, Follows commands, Recognition of people/places Safety/Judgement: Awareness of environment Functional Mobility Training: 
Bed Mobility: 
  
  
  
  
  
  
Transfers: 
Sit to Stand: Minimum assistance; Additional time; Adaptive equipment;Assist x1 Stand to Sit: Minimum assistance; Additional time Balance: 
Sitting: Intact Standing: Impaired; With support Standing - Static: Constant support; Fair 
Standing - Dynamic : Fair Ambulation/Gait Training: 
  
  
  
  
  
  
  
  
  
  
  
  
  
  
  
  
  
  
Stairs: Therapeutic Exercises:  
 
Pain Rating: 
 
 
Activity Tolerance:  
Poor and requires frequent rest breaks After treatment patient left in no apparent distress:  
Sitting in chair and Call bell within reach COMMUNICATION/COLLABORATION:  
The patients plan of care was discussed with: Occupational therapist and Registered nurse. Antonio Vasquez PTA Time Calculation: 16 mins

## 2021-04-22 NOTE — PROGRESS NOTES
[facility] 
 [address] [facility] [address] [facility] Phone: [phone]  
  
Nephrology Progress Note 
Brandan Lerner     1950     [id number] Date of Admission : 4/16/2021 04/22/21 CC: Follow up for ROCIO Assessment and Plan ROCIO on CKD 
- 2/2 CRS  
- Cr up slightly 
- d/c bumex infusion and transition to bumex 2mg IV TID 
- daily labs and strict I/Os 
  
CKD stage III. - baseline around 2 
- presumed 2/2 DM, HTN. 
- Bone marrow Bx not done. - Renal US appearance of Kidneys no suggestive of Amyloidosis  
  
Chronic systolic CHF, stage C NYHA class IV 
-Combined ischemic and nonischemic cardiomyopathy 
-S/p HM 2 LVAD implant 4/15/2017 by Dr. Noam Swanson at [facility] 
- hx of recurrent VT : off mexiletine - chronic RV failure. 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 Chronic anemia :  
+ve PYP testing.  
- anemia of chronic disease  
- started Epogen Leucopenia, Thrombocytopenia   
Severe COPD. Pulmonary hypertension. Chronic A. fib. History of endometrial Ca ATTR amyloidosis Interval History: 
Seen and examined. Cr stable overall, up some from yesterday. UOP unable to be recorded as her purewick is not capturing all UOP. No cp, sob, n/v/d reported. Review of Systems: A comprehensive review of systems was negative except for that written in the HPI. Current Medications:  
Current Facility-Administered Medications Medication Dose Route Frequency  warfarin (COUMADIN) tablet 4 mg  4 mg Oral ONCE  
 epoetin amalia-epbx (RETACRIT) injection 20,000 Units  20,000 Units SubCUTAneous Q MON, WED & FRI  sodium chloride (OCEAN) 0.65 % nasal squeeze bottle 2 Spray  2 Spray Both Nostrils Q2H PRN  
 bumetanide (BUMEX) 0.25 mg/mL infusion  1 mg/hr IntraVENous CONTINUOUS  therapeutic multivitamin (THERAGRAN) tablet 1 Tab  1 Tab Oral DAILY  polyethylene glycol (MIRALAX) packet 17 g  17 g Oral DAILY  levothyroxine (SYNTHROID) tablet 25 mcg  25 mcg Oral 6am  
 isosorbide mononitrate ER (IMDUR) tablet 30 mg  30 mg Oral DAILY  cephALEXin (KEFLEX) capsule 250 mg  250 mg Oral BID  sodium chloride (NS) flush 5-40 mL  5-40 mL IntraVENous Q8H  
 sodium chloride (NS) flush 5-40 mL  5-40 mL IntraVENous PRN  
 acetaminophen (TYLENOL) tablet 650 mg  650 mg Oral Q4H PRN  
 hydrALAZINE (APRESOLINE) 20 mg/mL injection 10 mg  10 mg IntraVENous Q4H PRN  
 albuterol (PROVENTIL VENTOLIN) nebulizer solution 2.5 mg  2.5 mg Nebulization Q4H PRN  
 arformoterol 15 mcg/budesonide 0.5 mg neb solution   Nebulization BID RT  
 hydrALAZINE (APRESOLINE) tablet 100 mg  100 mg Oral TID  hydrOXYzine HCL (ATARAX) tablet 10 mg  10 mg Oral TID PRN  
 insulin glargine (LANTUS) injection 20 Units  20 Units SubCUTAneous BID  PARoxetine (PAXIL) tablet 20 mg  20 mg Oral DAILY  ranolazine ER (RANEXA) tablet 1,000 mg  1,000 mg Oral BID  tafamidis cap 61 mg (Patient Supplied)  61 mg Oral DAILY  warfarin dosing per pharmacy   Other Rx Dosing/Monitoring  miconazole (MICOTIN) 2 % powder   Topical BID Allergies Allergen Reactions  Benzodiazepines Other (comments) Respiratory issues Objective: 
Vitals:   
Vitals:  
 04/21/21 2349 04/22/21 9870 04/22/21 6963 04/22/21 7062 BP:      
Pulse: 85 87 Resp: 28 26 Temp: 98.3 °F (36.8 °C) 98.3 °F (36.8 °C) SpO2:  96% 97% Weight:    119.7 kg (263 lb 14.3 oz) Height:      
 
Intake and Output: 
No intake/output data recorded. 04/20 1901 - 04/22 0700 In: 952.2 [P.O.:610; I.V.:342.2] Out: 3700 [[id number]] Physical Examination: 
General: Morbidly obese, in NAD Sharon Areas Neck: Supple,no mass palpable Lungs : CTA 
CVS: RRR, VAD sounds Abdomen: Soft, NT, BS +, obese Extremities: LUE lymphedema Skin: No rash or lesions. MS: No joint swelling, erythema, warmth Neurologic: AOOX 3  
 : sahu + 
  
 
[]    High complexity decision making was performed 
[]    Patient is at high-risk of decompensation with multiple organ involvement Lab Data Personally Reviewed: I have reviewed all the pertinent labs, microbiology data and radiology studies during assessment. Recent Labs  
  04/22/21 
0258 04/21/21 
1359 04/21/21 
1358 04/21/21 
0117 04/20/21 
0431   137  --  134* 136  137 140  139  
K 4.1  4.1  --  4.0 4.1  4.0 4.2  4.2 CL 98  97  --  97 99  99 103  102 CO2 34*  31  --  34* 28  31 29  31 *  113*  --  135* 80  84 104*  104* BUN 50*  50*  --  44* 42*  42* 44*  45* CREA 3.28*  3.38*  --  2.94* 2.76*  2.77* 2.87*  2.90* CA 9.0  8.9  --  9.2 9.0  9.0 8.9  8.8 MG 2.4  --  2.3 2.3 2.4 PHOS 3.8 3.7 3.6 3.3 4.4 ALB 4.0  4.0  --  3.9 3.4*  3.4* 3.4*  3.5 ALT 9*  --   --  7* 8* INR 2.2*  --   --  2.1* 2.0* Recent Labs  
  04/22/21 
5691 04/21/21 
1017 04/20/21 
0431 WBC 3.2* 3.3* 2.9* HGB 7.7* 8.0* 7.5* HCT 26.4* 27.2* 26.0*  
* 143* 121* No results found for: SDES Lab Results Component Value Date/Time Culture result: NO GROWTH 5 DAYS 11/10/2020 06:42 PM  
 Culture result: No Growth (<1000 cfu/mL) 11/09/2020 12:05 AM  
 Culture result: SCANT Morganella morganii ssp morganii (A) 10/30/2020 11:30 AM  
 Culture result: SCANT Morganella morganii ssp morganii (A) 10/30/2020 11:30 AM  
 Culture result: RARE DIPHTHEROIDS (A) 10/30/2020 11:30 AM  
 
Recent Results (from the past 24 hour(s)) CBC W/O DIFF Collection Time: 04/21/21 10:17 AM  
Result Value Ref Range WBC 3.3 (L) 3.6 - 11.0 K/uL  
 RBC 3.08 (L) 3.80 - 5.20 M/uL HGB 8.0 (L) 11.5 - 16.0 g/dL HCT 27.2 (L) 35.0 - 47.0 % MCV 88.3 80.0 - 99.0 FL  
 MCH 26.0 26.0 - 34.0 PG  
 MCHC 29.4 (L) 30.0 - 36.5 g/dL  
 RDW 17.8 (H) 11.5 - 14.5 % PLATELET 652 (L) 419 - 400 K/uL MPV 9.8 8.9 - 12.9 FL  
 NRBC 0.0 0  WBC ABSOLUTE NRBC 0.00 0.00 - 0.01 K/uL  
HIV 1/2 AG/AB, 4TH GENERATION,W RFLX CONFIRM Collection Time: 04/21/21 10:17 AM  
Result Value Ref Range HIV 1/2 Interpretation NONREACTIVE NR    
 HIV 1/2 result comment SEE NOTE    
HEPATITIS PANEL, ACUTE Collection Time: 04/21/21 10:17 AM  
Result Value Ref Range Hepatitis A, IgM NONREACTIVE NR    
 __ Hepatitis B surface Ag <0.10 Index Hep B surface Ag Interp. Negative NEG    
 __ Hepatitis B core, IgM NONREACTIVE NR    
 __ Hep C virus Ab Interp. NONREACTIVE NR Hep C  virus Ab comment Method used is [employer] GLUCOSE, POC Collection Time: 04/21/21 12:31 PM  
Result Value Ref Range Glucose (POC) 119 (H) 65 - 100 mg/dL Performed by Nisha Pitt RENAL FUNCTION PANEL Collection Time: 04/21/21  1:58 PM  
Result Value Ref Range Sodium 134 (L) 136 - 145 mmol/L Potassium 4.0 3.5 - 5.1 mmol/L Chloride 97 97 - 108 mmol/L  
 CO2 34 (H) 21 - 32 mmol/L Anion gap 3 (L) 5 - 15 mmol/L Glucose 135 (H) 65 - 100 mg/dL BUN 44 (H) 6 - 20 MG/DL Creatinine 2.94 (H) 0.55 - 1.02 MG/DL  
 BUN/Creatinine ratio 15 12 - 20 GFR est AA 19 (L) >60 ml/min/1.73m2 GFR est non-AA 16 (L) >60 ml/min/1.73m2 Calcium 9.2 8.5 - 10.1 MG/DL Phosphorus 3.6 2.6 - 4.7 MG/DL Albumin 3.9 3.5 - 5.0 g/dL MAGNESIUM Collection Time: 04/21/21  1:58 PM  
Result Value Ref Range Magnesium 2.3 1.6 - 2.4 mg/dL PHOSPHORUS Collection Time: 04/21/21  1:59 PM  
Result Value Ref Range Phosphorus 3.7 2.6 - 4.7 MG/DL  
GLUCOSE, POC Collection Time: 04/21/21  4:13 PM  
Result Value Ref Range Glucose (POC) 199 (H) 65 - 100 mg/dL Performed by Ryan Mercado GLUCOSE, POC Collection Time: 04/21/21  8:59 PM  
Result Value Ref Range Glucose (POC) 166 (H) 65 - 100 mg/dL Performed by Rachell SALAZAR Collection Time: 04/22/21  2:58 AM  
Result Value Ref Range  81 - 246 U/L  
PROTHROMBIN TIME + INR  Collection Time: 04/22/21  2:58 AM  
Result Value Ref Range INR 2.2 (H) 0.9 - 1.1 Prothrombin time 22.4 (H) 9.0 - 11.1 sec PROCALCITONIN Collection Time: 04/22/21  2:58 AM  
Result Value Ref Range Procalcitonin 0.29 ng/mL NT-PRO BNP Collection Time: 04/22/21  2:58 AM  
Result Value Ref Range NT pro-BNP 11,263 (H) <125 PG/ML  
LACTIC ACID Collection Time: 04/22/21  2:58 AM  
Result Value Ref Range Lactic acid 0.5 0.4 - 2.0 MMOL/L  
MAGNESIUM Collection Time: 04/22/21  2:58 AM  
Result Value Ref Range Magnesium 2.4 1.6 - 2.4 mg/dL METABOLIC PANEL, COMPREHENSIVE Collection Time: 04/22/21  2:58 AM  
Result Value Ref Range Sodium 137 136 - 145 mmol/L Potassium 4.1 3.5 - 5.1 mmol/L Chloride 97 97 - 108 mmol/L  
 CO2 31 21 - 32 mmol/L Anion gap 9 5 - 15 mmol/L Glucose 113 (H) 65 - 100 mg/dL BUN 50 (H) 6 - 20 MG/DL Creatinine 3.38 (H) 0.55 - 1.02 MG/DL  
 BUN/Creatinine ratio 15 12 - 20 GFR est AA 16 (L) >60 ml/min/1.73m2 GFR est non-AA 14 (L) >60 ml/min/1.73m2 Calcium 8.9 8.5 - 10.1 MG/DL Bilirubin, total 0.7 0.2 - 1.0 MG/DL  
 ALT (SGPT) 9 (L) 12 - 78 U/L  
 AST (SGOT) 12 (L) 15 - 37 U/L Alk. phosphatase 52 45 - 117 U/L Protein, total 7.9 6.4 - 8.2 g/dL Albumin 4.0 3.5 - 5.0 g/dL Globulin 3.9 2.0 - 4.0 g/dL A-G Ratio 1.0 (L) 1.1 - 2.2 RENAL FUNCTION PANEL Collection Time: 04/22/21  2:58 AM  
Result Value Ref Range Sodium 137 136 - 145 mmol/L Potassium 4.1 3.5 - 5.1 mmol/L Chloride 98 97 - 108 mmol/L  
 CO2 34 (H) 21 - 32 mmol/L Anion gap 5 5 - 15 mmol/L Glucose 110 (H) 65 - 100 mg/dL BUN 50 (H) 6 - 20 MG/DL Creatinine 3.28 (H) 0.55 - 1.02 MG/DL  
 BUN/Creatinine ratio 15 12 - 20 GFR est AA 17 (L) >60 ml/min/1.73m2 GFR est non-AA 14 (L) >60 ml/min/1.73m2 Calcium 9.0 8.5 - 10.1 MG/DL Phosphorus 3.8 2.6 - 4.7 MG/DL Albumin 4.0 3.5 - 5.0 g/dL GLUCOSE, POC Collection Time: 04/22/21  6:00 AM  
Result Value Ref Range  Glucose (POC) 132 (H) 65 - 100 mg/dL Performed by [facility] CBC W/O DIFF Collection Time: 04/22/21  6:09 AM  
Result Value Ref Range WBC 3.2 (L) 3.6 - 11.0 K/uL  
 RBC 2.97 (L) 3.80 - 5.20 M/uL HGB 7.7 (L) 11.5 - 16.0 g/dL HCT 26.4 (L) 35.0 - 47.0 % MCV 88.9 80.0 - 99.0 FL  
 MCH 25.9 (L) 26.0 - 34.0 PG  
 MCHC 29.2 (L) 30.0 - 36.5 g/dL  
 RDW 17.9 (H) 11.5 - 14.5 % PLATELET 115 (L) 758 - 400 K/uL MPV 9.3 8.9 - 12.9 FL  
 NRBC 0.0 0  WBC ABSOLUTE NRBC 0.00 0.00 - 0.01 K/uL I have reviewed the flowsheets. Chart and Pertinent Notes have been reviewed. No change in PMH ,family and social history from Consult note.  
 
 
Lillian Ramon MD

## 2021-04-22 NOTE — PROGRESS NOTES
Problem: Pressure Injury - Risk of 
Goal: *Prevention of pressure injury Description: Document Jaime Scale and appropriate interventions in the flowsheet. Outcome: Progressing Towards Goal 
Note: Pressure Injury Interventions: 
Sensory Interventions: Keep linens dry and wrinkle-free, Discuss PT/OT consult with provider, Minimize linen layers Moisture Interventions: Absorbent underpads, Apply protective barrier, creams and emollients Activity Interventions: Increase time out of bed, Pressure redistribution bed/mattress(bed type), PT/OT evaluation Mobility Interventions: Pressure redistribution bed/mattress (bed type), Float heels Nutrition Interventions: Document food/fluid/supplement intake, Offer support with meals,snacks and hydration Friction and Shear Interventions: Apply protective barrier, creams and emollients, Minimize layers

## 2021-04-22 NOTE — PROGRESS NOTES
1930: Bedside and Verbal shift change report given to [facility], [address] [place] [address] (oncoming nurse) by Hardin Cushing, RN (offgoing nurse). Report included the following information SBAR, Kardex, ED Summary, MAR, Recent Results and Cardiac Rhythm VPACED.  
 
0730: Bedside and Verbal shift change report given to Hardin Cushing, RN (oncoming nurse) by Michael Jenkins RN (offgoing nurse). Report included the following information SBAR, Kardex, ED Summary, MAR, Recent Results and Cardiac Rhythm VPACED.

## 2021-04-22 NOTE — PROGRESS NOTES
0920hrs:  RN spoke with Dr. Carma Mohs regarding plan of care. Patient to be transitioned from IV bumex to IVP bumex and eventually to PO but he will follow up with Heart failure team first. RN informed MD that an accurate urine output was difficult to obtain due to purewick positioning. Patient assessment completed. Patient appears to have more muscle spasms today than previously noted. No complaints of pain. Vitals stable. MAP 94--am meds administered. Max two assist to chair. Patient declined to change over to batteries from power module at this time.

## 2021-04-23 NOTE — CONSULTS
Palliative Medicine Consult Charlie: [phone] ([phone]) Consult received and chart reviewed. Case discussed with heart failure team. 
 
I went to see the patient, but her daughter asked me to come back because she was in the middle of a virtual visit over the phone for her baby's pediatrician appointment. Dr. Amy Rodriguez then went to see patient and remained in the room for an extended time talking to the patient and her daughter. I was unable to see the patient after waiting for 30 minutes. Palliative team will follow up Monday for consult.

## 2021-04-23 NOTE — PROGRESS NOTES
Pharmacist Note  Warfarin Dosing Consult provided for this 78 y. o.female to manage warfarin for LVAD INR Goal: 1.8-2.5 per NP (lowered due to GIB) Home regimen/ tablet size: 4 mg daily per most recent prescription Drugs that may increase INR: None Drugs that may decrease INR: None Other current anticoagulants/ drugs that may increase bleeding risk: None Risk factors: Age > 72 Daily INR ordered: YES Recent Labs  
  04/23/21 
0447 04/22/21 
0609 04/22/21 
0258 04/21/21 
1017 04/21/21 
0117 HGB 7.4* 7.7*  --  8.0*  --   
INR 2.6*  --  2.2*  --  2.1* Date               INR                  Dose 4/16  1.6  4 mg 4/17  1.8  4 mg 4/18  1.8  4 mg 4/19  1.8  4 mg  
4/20                 2.0                  4 mg 
4/21                 2.1                  4 mg 
4/22                 2.2                  4 mg 
4/23                 2.6                  2.5 mg 
                                                                            
Assessment/ Plan: 
INR slightly above goal. Will order warfarin 2.5 mg PO x 1 dose today. Pharmacy will continue to monitor daily and adjust therapy as indicated. Please contact the pharmacist at  for outpatient recommendations if needed.

## 2021-04-23 NOTE — PROGRESS NOTES
ID Progress Note 2021 Subjective: No new complaints Review of Systems: 
         
Symptom Y/N Comments   Symptom Y/N Comments Fever/Chills n      Chest Pain  n     
Poor Appetite       Edema  y Cough       Abdominal Pain Sputum       Joint Pain SOB/HAIRSTON y      Pruritis/Rash Nausea/vomit  n     Tolerating PT/OT Diarrhea  n     Tolerating Diet Constipation  n     Other Could NOT obtain due to:    
 
Objective:  
 
Vitals:  
Visit Vitals BP (!) 80/0 Pulse 82 Temp 98.1 °F (36.7 °C) Resp 20 Ht 5' 7\" (1.702 m) Wt 118 kg (260 lb 2.3 oz) SpO2 93% BMI 40.74 kg/m² Tmax:  Temp (24hrs), Av.9 °F (36.6 °C), Min:97.5 °F (36.4 °C), Max:98.4 °F (36.9 °C) PHYSICAL EXAM: 
General: morbidly obese, Chronically ill appearing, Alert, cooperative, no acute distress EENT:  EOMI. Anicteric sclerae. MMM Resp:  Clear in apex with a few crackles at bases, no wheezing or rales. No accessory muscle use CV:  Regular  rhythm,  trace of pitting edema, LVAD hum GI:  Soft, Non distended, Non tender. +Bowel sounds Neurologic:  Alert and oriented X 3, normal speech, Psych:   Good insight. Not anxious nor agitated Skin:  No rashes. No jaundice, Venous stasis Labs:  
Lab Results Component Value Date/Time WBC 2.8 (L) 2021 04:47 AM  
 HGB 7.4 (L) 2021 04:47 AM  
 HCT 25.5 (L) 2021 04:47 AM  
 PLATELET 392 (L)  04:47 AM  
 MCV 89.5 2021 04:47 AM  
 
Lab Results Component Value Date/Time  Sodium 138 2021 04:47 AM  
 Sodium 138 2021 04:47 AM  
 Potassium 4.0 2021 04:47 AM  
 Potassium 3.9 2021 04:47 AM  
 Chloride 97 2021 04:47 AM  
 Chloride 97 2021 04:47 AM  
 CO2 32 2021 04:47 AM  
 CO2 33 (H) 2021 04:47 AM  
 Anion gap 9 2021 04:47 AM  
 Anion gap 8 2021 04:47 AM  
 Glucose 81 2021 04:47 AM  
 Glucose 81 2021 04:47 AM  
 BUN 56 (H) 04/23/2021 04:47 AM  
 BUN 57 (H) 04/23/2021 04:47 AM  
 Creatinine 3.68 (H) 04/23/2021 04:47 AM  
 Creatinine 3.63 (H) 04/23/2021 04:47 AM  
 BUN/Creatinine ratio 15 04/23/2021 04:47 AM  
 BUN/Creatinine ratio 16 04/23/2021 04:47 AM  
 GFR est AA 15 (L) 04/23/2021 04:47 AM  
 GFR est AA 15 (L) 04/23/2021 04:47 AM  
 GFR est non-AA 12 (L) 04/23/2021 04:47 AM  
 GFR est non-AA 12 (L) 04/23/2021 04:47 AM  
 Calcium 9.0 04/23/2021 04:47 AM  
 Calcium 9.3 04/23/2021 04:47 AM  
 Bilirubin, total 0.7 04/23/2021 04:47 AM  
 Alk. phosphatase 50 04/23/2021 04:47 AM  
 Protein, total 7.9 04/23/2021 04:47 AM  
 Albumin 4.0 04/23/2021 04:47 AM  
 Albumin 4.0 04/23/2021 04:47 AM  
 Globulin 3.9 04/23/2021 04:47 AM  
 A-G Ratio 1.0 (L) 04/23/2021 04:47 AM  
 ALT (SGPT) 8 (L) 04/23/2021 04:47 AM  
 
 
 
Assessment and Plan Elevated sed rate - trending down 59 (4/18) from 71 on admission Afebrile Wbc 3.2->2.8 Continue with keflex for now;  
   -> keflex was started as suppressive therapy for chronic sternal wound infection which has been completely healed. No active source of infection at this time. It is OK to stop the ABX if hematologist contribute leukopneia is secondary to beta lactam. 
   CBC with diff every other day has been ordered Fever work up if temp >= 100.4 
  
Pancytopenia 
- hematologist has been consulted  
  
Acute on chronic systolic heart failure - cardiology following ROCIO on CKD 
- nephrology following Pt will be seen as need. Please contact us with any questions or concerns.   
Oly Nguyen, NP

## 2021-04-23 NOTE — PROGRESS NOTES
[facility] 
 [address] [facility], [facility] [address] [facility] Phone: [phone]  
  
Nephrology Progress Note 
Juan Monroy     1950     [id number] Date of Admission : 4/16/2021 04/23/21 CC: Follow up for ROCIO Assessment and Plan ROCIO on CKD 
- 2/2 CRS but now worse from diuresis (which she needed) 
- labs pending and holding Bumex  
- if Cr stable , resume Bumex at 2 mg BID  
- if Cr worse, no diuretics today  
- she is at the verge of dialysis and she is expected to do poorly with dialysis. If Palliative care has not been involved, we should probably get them involved. If daughter Kaila Mendez comes by today, will d/w with her  
- labs daily. Dr Christina Hawk covering me over the w/e 
 
CKD stage III. - baseline around 2 
- presumed 2/2 DM, HTN. 
- Bone marrow Bx not done. - Renal US appearance of Kidneys no suggestive of Amyloidosis  
  
Chronic systolic CHF, stage C NYHA class IV 
-Combined ischemic and nonischemic cardiomyopathy 
-S/p HM 2 LVAD implant 4/15/2017 by Dr. Selin Lopez at [facility] 
- hx of recurrent VT : off mexiletine - chronic RV failure. 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 Chronic anemia :  
+ve PYP testing.  
- anemia of chronic disease  
- continue HEIKE Leucopenia, Thrombocytopenia   
Severe COPD. Pulmonary hypertension. Chronic A. fib. History of endometrial Ca ATTR amyloidosis Interval History: 
Seen and examined. Labs pending. UOP not accurate . 16 lb weight loss w/ Bumex gtt. Edema resolved including LUE Lymphedema . No cp, sob, n/v/d reported. Review of Systems: A comprehensive review of systems was negative except for that written in the HPI. Current Medications:  
Current Facility-Administered Medications Medication Dose Route Frequency  warfarin (COUMADIN) tablet 2.5 mg  2.5 mg Oral ONCE  
 oxymetazoline (AFRIN) 0.05 % nasal spray 2 Spray  2 Spray Both Nostrils BID PRN  
 epoetin amalia-epbx (RETACRIT) injection 20,000 Units  20,000 Units SubCUTAneous Q MON, WED & FRI  sodium chloride (OCEAN) 0.65 % nasal squeeze bottle 2 Spray  2 Spray Both Nostrils Q2H PRN  therapeutic multivitamin (THERAGRAN) tablet 1 Tab  1 Tab Oral DAILY  polyethylene glycol (MIRALAX) packet 17 g  17 g Oral DAILY  levothyroxine (SYNTHROID) tablet 25 mcg  25 mcg Oral 6am  
 isosorbide mononitrate ER (IMDUR) tablet 30 mg  30 mg Oral DAILY  cephALEXin (KEFLEX) capsule 250 mg  250 mg Oral BID  sodium chloride (NS) flush 5-40 mL  5-40 mL IntraVENous Q8H  
 sodium chloride (NS) flush 5-40 mL  5-40 mL IntraVENous PRN  
 acetaminophen (TYLENOL) tablet 650 mg  650 mg Oral Q4H PRN  
 hydrALAZINE (APRESOLINE) 20 mg/mL injection 10 mg  10 mg IntraVENous Q4H PRN  
 albuterol (PROVENTIL VENTOLIN) nebulizer solution 2.5 mg  2.5 mg Nebulization Q4H PRN  
 arformoterol 15 mcg/budesonide 0.5 mg neb solution   Nebulization BID RT  
 hydrALAZINE (APRESOLINE) tablet 100 mg  100 mg Oral TID  hydrOXYzine HCL (ATARAX) tablet 10 mg  10 mg Oral TID PRN  
 insulin glargine (LANTUS) injection 20 Units  20 Units SubCUTAneous BID  PARoxetine (PAXIL) tablet 20 mg  20 mg Oral DAILY  ranolazine ER (RANEXA) tablet 1,000 mg  1,000 mg Oral BID  tafamidis cap 61 mg (Patient Supplied)  61 mg Oral DAILY  warfarin dosing per pharmacy   Other Rx Dosing/Monitoring  miconazole (MICOTIN) 2 % powder   Topical BID Allergies Allergen Reactions  Benzodiazepines Other (comments) Respiratory issues Objective: 
Vitals:   
Vitals:  
 04/22/21 2126 04/22/21 2335 04/23/21 0251 04/23/21 0500 BP:      
Pulse:  85  84 Resp:  20  22 Temp:  97.6 °F (36.4 °C)  97.9 °F (36.6 °C) SpO2: 95% 97% 97% 96% Weight:    118 kg (260 lb 2.3 oz) Height:      
 
Intake and Output: 
No intake/output data recorded. 04/21 1901 - 04/23 0700 In: 1186.1 [P.O.:820; I.V.:366.1] Out: 850 [Urine:850] Physical Examination: General: Morbidly obese, in NAD Genevive [place] Neck: Supple,no mass palpable Lungs : CTA  
CVS: RRR, VAD sounds Abdomen: Soft, NT, BS +, obese Extremities: LE discoloration, LUE lymphedema Neurologic: AOOX 3  
 :purewick 
  
 
[]    High complexity decision making was performed 
[]    Patient is at high-risk of decompensation with multiple organ involvement Lab Data Personally Reviewed: I have reviewed all the pertinent labs, microbiology data and radiology studies during assessment. Recent Labs  
  04/23/21 0447 04/22/21 
0258 04/21/21 
1359 04/21/21 
1358 04/21/21 
0117 NA  --  137  137  --  134* 136  137 K  --  4.1  4.1  --  4.0 4.1  4.0  
CL  --  98  97  --  97 99  99 CO2  --  34*  31  --  34* 28  31 GLU  --  110*  113*  --  135* 80  84 BUN  --  50*  50*  --  44* 42*  42* CREA  --  3.28*  3.38*  --  2.94* 2.76*  2.77* CA  --  9.0  8.9  --  9.2 9.0  9.0 MG  --  2.4  --  2.3 2.3 PHOS  --  3.8 3.7 3.6 3.3 ALB  --  4.0  4.0  --  3.9 3.4*  3.4* ALT  --  9*  --   --  7* INR 2.6* 2.2*  --   --  2.1* Recent Labs  
  04/23/21 0447 04/22/21 
0609 04/21/21 
1017 WBC 2.8* 3.2* 3.3* HGB 7.4* 7.7* 8.0*  
HCT 25.5* 26.4* 27.2*  
* 128* 143* No results found for: SDES Lab Results Component Value Date/Time Culture result: NO GROWTH 5 DAYS 11/10/2020 06:42 PM  
 Culture result: No Growth (<1000 cfu/mL) 11/09/2020 12:05 AM  
 Culture result: SCANT Morganella morganii ssp morganii (A) 10/30/2020 11:30 AM  
 Culture result: SCANT Morganella morganii ssp morganii (A) 10/30/2020 11:30 AM  
 Culture result: RARE DIPHTHEROIDS (A) 10/30/2020 11:30 AM  
 
Recent Results (from the past 24 hour(s)) GLUCOSE, POC Collection Time: 04/22/21  5:17 PM  
Result Value Ref Range Glucose (POC) 86 65 - 100 mg/dL Performed by Tico Oliva GLUCOSE, POC Collection Time: 04/22/21  9:21 PM  
Result Value Ref Range  Glucose (POC) 111 (H) 65 - 100 mg/dL Performed by Arnold Lu   
LD Collection Time: 04/23/21  4:47 AM  
Result Value Ref Range  81 - 246 U/L  
PROTHROMBIN TIME + INR Collection Time: 04/23/21  4:47 AM  
Result Value Ref Range INR 2.6 (H) 0.9 - 1.1 Prothrombin time 25.6 (H) 9.0 - 11.1 sec PROCALCITONIN Collection Time: 04/23/21  4:47 AM  
Result Value Ref Range Procalcitonin 0.29 ng/mL NT-PRO BNP Collection Time: 04/23/21  4:47 AM  
Result Value Ref Range NT pro-BNP 14,369 (H) <125 PG/ML  
LACTIC ACID Collection Time: 04/23/21  4:47 AM  
Result Value Ref Range Lactic acid 0.5 0.4 - 2.0 MMOL/L  
CBC WITH AUTOMATED DIFF Collection Time: 04/23/21  4:47 AM  
Result Value Ref Range WBC 2.8 (L) 3.6 - 11.0 K/uL  
 RBC 2.85 (L) 3.80 - 5.20 M/uL HGB 7.4 (L) 11.5 - 16.0 g/dL HCT 25.5 (L) 35.0 - 47.0 % MCV 89.5 80.0 - 99.0 FL  
 MCH 26.0 26.0 - 34.0 PG  
 MCHC 29.0 (L) 30.0 - 36.5 g/dL  
 RDW 18.0 (H) 11.5 - 14.5 % PLATELET 769 (L) 138 - 400 K/uL MPV 9.8 8.9 - 12.9 FL  
 NRBC 0.0 0  WBC ABSOLUTE NRBC 0.00 0.00 - 0.01 K/uL NEUTROPHILS 75 32 - 75 % LYMPHOCYTES 15 12 - 49 % MONOCYTES 9 5 - 13 % EOSINOPHILS 0 0 - 7 % BASOPHILS 0 0 - 1 % IMMATURE GRANULOCYTES 1 (H) 0.0 - 0.5 % ABS. NEUTROPHILS 2.1 1.8 - 8.0 K/UL  
 ABS. LYMPHOCYTES 0.4 (L) 0.8 - 3.5 K/UL  
 ABS. MONOCYTES 0.3 0.0 - 1.0 K/UL  
 ABS. EOSINOPHILS 0.0 0.0 - 0.4 K/UL  
 ABS. BASOPHILS 0.0 0.0 - 0.1 K/UL  
 ABS. IMM. GRANS. 0.0 0.00 - 0.04 K/UL  
 DF SMEAR SCANNED    
 RBC COMMENTS ANISOCYTOSIS 1+ 
    
 RBC COMMENTS OVALOCYTES PRESENT 
    
GLUCOSE, POC Collection Time: 04/23/21  7:35 AM  
Result Value Ref Range Glucose (POC) 87 65 - 100 mg/dL Performed by Paola Mijares I have reviewed the flowsheets. Chart and Pertinent Notes have been reviewed. No change in PMH ,family and social history from Consult note.  
 
 
Renetta Coker MD

## 2021-04-23 NOTE — WOUND CARE
WOCN Note:  
  
Follow up visit to reassess lower leg wounds and right buttock wound 
  
Chart shows: 
Patient admitted on 4/16/21, was direct admit from physicians office r/t heart failure Past Medical History:  
Diagnosis Date  Asthma    
 Cancer ([facility] [address]    
  breast  
 Cancer ([facility] [address]    
  endometrial  
 Congestive heart failure, unspecified    
 CRI (chronic renal insufficiency)    
 Depression    
 Diabetes ([facility] [address]    
 Hypertension    
  
Admitted from home WBC = 2.8 
  
Assessment:  
A&O x 4, Appropriately conversational 
Reports no pain in 
Mobility: Moderate assistance with repositioning. Pateint needs to have HOB elevated high due to SOB and oxygen saturation Continence: Incontinent of urine and stool, purewick in use for urine containment Last Jaime Score: 15 
Surface: Versacare p500 mattress Diet: Diabetic consistent card with magic cup supplements Bilateral heel and sacral skin intact. Sacrum without erythema. Bilateral heels with blanchable erythema Heels were on mattress, offloaded with pillows  
  
Patient has been receiving home care through [facility] Sent for wound care 1. POA Right anterior lower leg wound Etiology: Venous Area healed, fully epithelialized Image from 4/23/21 2. POA Right medial lower leg wound Etiology: Trauma, Pt hit leg on  door Full thickness 1.1 x 0.8 x 0.1 cm Base is 100% red  
serosang exudate, small amount 
no odor Attached flat edges Periwound intact and skin dry Image from 4/23/21 3. POA Left anterior lower leg wound Etiology: Venous Area covered with thin crust 
Area moisturized, left open to air Image from 4/23/21 4. POA Stage 3 healing pressure injury to right buttock Etiology: Pressure Are significantly improved from last assessment on 4/16/21 
0.3 x 0.5 x <0.1cm Base is pink No exudate Flat open edges Periwound intact, skin dry/flaky Image from 4/23/21 Verbal consent given for photos Wound Recommendations:   
  
Continue to cleanse right medial lower leg wound with normal saline, apply honey sheet, cover with optifoam border dressing every other day and prn if becomes soiled 
  
Continue Miconazole powder under abdominal fold BID after cleansing area well with Remedy foaming cleanser and patting dry Apply Z-Guard skin barrier cream to buttocks after gently cleansing area with Remedy foaming cleanser and patting dry every 8 hours and prn after each incontinent episode Moisturize bilateral lower legs with remedy skin cream every shift PI Prevention: 
Turn/reposition approximately every 2 hours Offload heels with pillows at all times in bed. Minimize layers of linen/pads under patient to optimize support surface to one sheet and one incontinence pad  
  
Discussed with RNAngeles Teaching completed with:  
[x]? Patient                                 
[]? Family member                      
[]? Caregiver                                        
[]? Nursing 
[]? Other 
  
Topics Discussed: Leg elevation, wound care, pressure relief, turning and repositiong 
  
Patient/Caregiver Teaching: 
Level of patient/caregiver understanding able to:  
[x]? Indicates understanding                []? Needs reinforcement 
[]? Unsuccessful                                 []? Verbal Understanding 
[]? Demonstrated understanding        []? No evidence of learning 
[]? Refused teaching                           []? N/A 
  
Transition of Care: Plan to follow weekly and as needed while admitted to hospital.   
  
Elizabeth GARRIDON, RN, [facility] Certified Wound and Ostomy Nurse 
office [phone] 
pager [phone] or call  to page

## 2021-04-23 NOTE — PROGRESS NOTES
[address] [facility] [address] [place], [place] Inpatient Consult Progress Note Patient name: Keshav Estrada Patient : 1950 Patient MRN: [id number] Consulting MD: Yolette Upton MD 
Date of service: 21 CHIEF COMPLAINT: 
Dyspnea 
  
PLAN: 
· RHC  showed adequate Sal CI 3.0 but severely elevated RA pressure 24 mmHg · Nephrology consult for worsening renal function; holding diuretics today due to significantly worse Cr, 3.7 · ID consult for pancytopenia and persistent sed rate appreciated; ?alterantive abx agent - recommend against stopping antibiotics altogether due to chronic SWI/suspected pump infection and high risk of clinical deterioration if infection is not suppressed · Hematology consult for pancytopenia appreciated · Palliative Care Consult to discuss goals and limits of care including HD - patient is a poor candidate for HD due to:  
· End stage HF with LVAD and RV failure · Chronic, active infection which would likely seed HD line · Difficult disposition if patient were to require outpatient HD (woud likely have to travel to [facility] several times per week to accomplish this) Continue increased device speed to 9600rpm due to LVEDD up to 8.3cm TTE  shows improved LVIDd, 6.68 cm with RVIDd 5.14 cm and TAPSE 1.1 cm Previously was intolerant to higher speeds due to VT; observe closely Hold diuretics today and monitor Intolerant to BB/ACEi/ARB/ARNI due to aTTR Continue tafamidis 61 mg PO daily Hydralazine 100 mg po TID, MAP above goal 70-90 mmHg in absence of palpable radial pulse   
Continue Imdur 30 mg PO daily Intolerant of spironolactone due to hyperkalemia  
Coumadin dose per pharmacy; goal INR lowered to 1.8-2.5 due to GIB Continue ranolazine, no ectopy - followed by Dr. Albert Murphy OP Antibiotics for chronic sternal wound infection per ID - continue Keflex po Check FOB due to anemia TSH 3.99; continue Synthroid 25 mcg qAM Use home Trilogy when napping and QHS PFTs when euvolemic  
SLP consult due to dysphagia appreciated Continue Miralax daily while aggressively diuresing WOCN consult for BLE wounds PT/OT eval 
Palliative care consult appreciated; patient remains full code, AMD given to patient/daughter to complete DM management per Hospitalist  
Up to date on flu, PNA, and COVID vaccinations CM to assist with dispo planning; patient has [facility] and home care aide already set up  
Telephone update provided to daughter, Blayne Sagsatume IMPRESSION: 
R leg cellulitis BLE edema Acute on chronic RV failure Coronary artery disease · [facility] (8/2016) high grade ramus and small PDA disease, borderline disease of LAD and takeoff of pRCA Chronic systolic heart failure · Stage C, NYHA class IV improved to IIIA symptoms with LVAD · Combined ischemic and non-ischemic cardiomyopathy, LVEF 15% · Mitral regurtigation, moderate to severe, resolved C/b cardiogenic shock s/p Impella bridge to LVAD S/p HeartMate 2 LVAD implantation (4/5/17 by Dr. Cliff Oliver) · C/b delayed extubation due to severe COPD 
· C/b critical illness polyneuropathy · C/b prolonged hospitalization post-LVAD, 55 days · C/b sternal wound infection s/p debridement (by Dr. Noam Swanson) s/p wound vac · C/b sacral ulcer · Would culture positive for Staph aureus, not MRSA · C/b LVAD site drainage, improved S/p CRT-ICD · ICD fired due to electrolyte imbalance (2011) H/o breast cancer (1992) · s/p bilateral mastectomy/chemo and endometrial cancer s/p hysterectomy · Lymphedema of LLE due to cancer treatment Severe COPD with FEV1 50% Depression Atrial fibrillation H/o \"two mini strokes\" S/p fall with hip facture · Right hip hemmiarthroplasty (5/23/18) by Dr. Ursula Ravi) · S/p removed hip hardwarare due to pain (4/15/19) COPD severe CKD, stage 3 Hyperkalemia, resolved Pulmonary hypertension Cardiac risk factors: · Morbid obesity, Body mass index is 42.29 kg/m². 
· DM2 insulin dependent · JED on Trilogy · HL Urinary incontinence, severe · no procedures due to anticoagulation Endometrial cancer (2002) HTN 
HL 
  
CARDIAC IMAGING: 
Echo (9/24/19) LVEF 20-25%, AV opens 1:1, no AR Echo (5/29/18) LVEF 10-%, ramps study done, report in [facility] Echo (1/9/18) ramp study done, LVEDD 7.1cm LHC (11/9/18) 2 vessel disease with 90% OM, 80% PDA, DSA to PDA branch 
  INTERVAL HISTORY: 
Inadequate diuresis Cr up to 3.68 Hgb 7.4 MAPs stable 
proBNP increased 42175 from 23071 INR 2.6 LVAD INTERROGATION: 
Device interrogated in person Device function normal, normal flow, no events LVAD Pump Speed (RPM): 9222 Pump Flow (LPM): 6.3 MAP: 80 
PI (Pulsitility Index): 4.5 Power: 6.6  Test: No 
Back Up  at Bedside & Labeled: Yes Power Module Test: No 
Driveline Site Care: No 
Driveline Dressing: Clean, Dry, and Intact Outpatient: No 
MAP in Therapeutic Range (Outpatient): Yes Testing Alarms Reviewed: Yes 
Back up SC speed: 9600 Back up Low Speed Limit: 9200 Emergency Equipment with Patient?: Yes Emergency procedures reviewed?: Yes Drive line site inspected?: No 
Drive line intergrity inspected?: Yes Drive line dressing changed?: No 
 
PHYSICAL EXAM: 
Visit Vitals BP (!) 80/0 Pulse 82 Temp 98.1 °F (36.7 °C) Resp 20 Ht 5' 7\" (1.702 m) Wt 260 lb 2.3 oz (118 kg) SpO2 93% BMI 40.74 kg/m² General: Patient is well developed, well-nourished in no acute distress HEENT: Normocephalic and atraumatic. No scleral icterus. Pupils are equal, round and reactive to light and accomodation. No conjunctival injection. Oropharynx is clear. Neck: Supple. No evidence of thyroid enlargements or lymphadenopathy. JVD: Cannot be appreciated Lungs: Breath sounds are equal and clear bilaterally. No wheezes, rhonchi, or rales. Heart: Regular rate and rhythm with normal S1 and S2. No murmurs, gallops or rubs.  
Abdomen: Soft, no mass or tenderness. No organomegaly or hernia. Bowel sounds present. Genitourinary and rectal: deferred Extremities: BLE + venous stasis Neurologic: No focal sensory or motor deficits are noted. Grossly intact. Psychiatric: Awake, alert an doriented x 3. Appropriate mood and affect. Skin: Warm, dry and well perfused. No lesions, nodules or rashes are noted. REVIEW OF SYSTEMS: 
General: Denies fever, night sweats. Ear, nose and throat: Denies difficulty hearing, sinus problems, runny nose, post-nasal drip, ringing in ears, mouth sores, loose teeth, ear pain, nosebleeds, sore throate, facial pain or numbess Cardiovascular: see above in the interval history Respiratory: Denies cough, wheezing, sputum production, hemoptysis. Gastrointestinal: Denies heartburn, constipation, intolerance to certain foods, diarrhea, abdominal pain, nausea, vomiting, difficulty swallowing, blood in stool Kidney and bladder: Denies painful urination, frequent urination, urgency, prostate problems and impotence Musculoskeletal: Denies joint pain, muscle weakness Skin and hair: Denies change in existing skin lesions, hair loss or increase, breast changes PAST MEDICAL HISTORY: 
Past Medical History:  
Diagnosis Date  Asthma  Cancer ([facility] [address] breast  
 Cancer ([facility] [address]   
 endometrial  
 Congestive heart failure, unspecified  CRI (chronic renal insufficiency)  Depression  Diabetes ([facility] [address]  Hypertension PAST SURGICAL HISTORY: 
Past Surgical History:  
Procedure Laterality Date  HX HERNIA REPAIR    
 HX HYSTERECTOMY  HX MASTECTOMY FAMILY HISTORY: 
No family history on file. SOCIAL HISTORY: 
Social History Socioeconomic History  Marital status:  Spouse name: Not on file  Number of children: Not on file  Years of education: Not on file  Highest education level: Not on file Tobacco Use  Smoking status: Never Smoker  Smokeless tobacco: Never Used Substance and Sexual Activity  Alcohol use: Not Currently LABORATORY RESULTS: 
  
Labs Latest Ref Rng & Units 4/23/2021 4/23/2021 4/22/2021 4/22/2021 4/22/2021 4/21/2021 4/21/2021 WBC 3.6 - 11.0 K/uL - 2. 8(L) 3. 2(L) - - - 3. 3(L)  
RBC 3.80 - 5.20 M/uL - 2.85(L) 2.97(L) - - - 3.08(L) Hemoglobin 11.5 - 16.0 g/dL - 7. 4(L) 7. 7(L) - - - 8. 0(L) Hematocrit 35.0 - 47.0 % - 25. 5(L) 26. 4(L) - - - 27. 2(L) MCV 80.0 - 99.0 FL - 89.5 88.9 - - - 88.3 Platelets 448 - 870 K/uL - 130(L) 128(L) - - - 143(L) Lymphocytes 12 - 49 % - 15 - - - - - Monocytes 5 - 13 % - 9 - - - - - Eosinophils 0 - 7 % - 0 - - - - - Basophils 0 - 1 % - 0 - - - - - Albumin 3.5 - 5.0 g/dL 4.0 4.0 - 4.0 4.0 3.9 - Calcium 8.5 - 10.1 MG/DL 9.0 9.3 - 9.0 8.9 9.2 - Glucose 65 - 100 mg/dL 81 81 - 110(H) 113(H) 135(H) -  
BUN 6 - 20 MG/DL 56(H) 57(H) - 50(H) 50(H) 44(H) -  
Creatinine 0.55 - 1.02 MG/DL 3.68(H) 3.63(H) - 3.28(H) 3.38(H) 2.94(H) - Sodium 136 - 145 mmol/L 138 138 - 137 137 134(L) - Potassium 3.5 - 5.1 mmol/L 4.0 3.9 - 4.1 4.1 4.0 -  
TSH 0.36 - 3.74 uIU/mL - - - - - - -  
LDH 81 - 246 U/L - 177 - - 176 - - Some recent data might be hidden Lab Results Component Value Date/Time TSH 3.99 (H) 04/17/2021 04:54 AM  
 TSH 2.980 10/01/2020 12:00 AM  
 
 
ALLERGY: 
Allergies Allergen Reactions  Benzodiazepines Other (comments) Respiratory issues CURRENT MEDICATIONS: 
 
Current Facility-Administered Medications:  
  warfarin (COUMADIN) tablet 2.5 mg, 2.5 mg, Oral, ONCE, Holly Walter NP 
  gabapentin (NEURONTIN) capsule 200 mg, 200 mg, Oral, TID, Luis Brizuela MD, 200 mg at 04/23/21 1228   oxymetazoline (AFRIN) 0.05 % nasal spray 2 Spray, 2 Spray, Both Nostrils, BID PRN, Holly Walter NP, 2 [place] at 04/22/21 1800   epoetin amalia-epbx (RETACRIT) injection 20,000 Units, 20,000 Units, SubCUTAneous, Q MON, WED & FRI, Liliya Lamb MD, 20,000 Units at 04/21/21 2106   sodium chloride (OCEAN) 0.65 % nasal squeeze bottle 2 Spray, 2 Spray, Both Nostrils, Q2H PRN, Charan Walter NP 
  therapeutic multivitamin (THERAGRAN) tablet 1 Tab, 1 Tab, Oral, DAILY, Charan Walter NP, 1 Tab at 04/23/21 0900   polyethylene glycol (MIRALAX) packet 17 g, 17 g, Oral, DAILY, Matd Campbell Do T, NP, 17 g at 04/23/21 0900   levothyroxine (SYNTHROID) tablet 25 mcg, 25 mcg, Oral, 6am, Charan Walter NP, 25 mcg at 04/23/21 8521   isosorbide mononitrate ER (IMDUR) tablet 30 mg, 30 mg, Oral, DAILY, Jose Angel Campbell Do T, NP, 30 mg at 04/23/21 0900   cephALEXin (KEFLEX) capsule 250 mg, 250 mg, Oral, BID, Dirk Cockayne, MD, 250 mg at 04/23/21 0930 
  sodium chloride (NS) flush 5-40 mL, 5-40 mL, IntraVENous, Q8H, Jose Angel Joel Do T, NP, 10 mL at 04/23/21 0600 
  sodium chloride (NS) flush 5-40 mL, 5-40 mL, IntraVENous, PRN, Jose Angel Joel Do T, NP, 10 mL at 04/22/21 1238   acetaminophen (TYLENOL) tablet 650 mg, 650 mg, Oral, Q4H PRN, Charan Walter NP, 650 mg at 04/18/21 0764   hydrALAZINE (APRESOLINE) 20 mg/mL injection 10 mg, 10 mg, IntraVENous, Q4H PRN, Jose Angel, Joel Do T, NP, 10 mg at 04/21/21 6392   albuterol (PROVENTIL VENTOLIN) nebulizer solution 2.5 mg, 2.5 mg, Nebulization, Q4H PRN, Charan Walter NP 
  arformoterol 15 mcg/budesonide 0.5 mg neb solution, , Nebulization, BID RT, Charan Walter NP, Given at 04/23/21 1058   hydrALAZINE (APRESOLINE) tablet 100 mg, 100 mg, Oral, TID, Polliard, Joel Do T, NP, 100 mg at 04/23/21 6816   hydrOXYzine HCL (ATARAX) tablet 10 mg, 10 mg, Oral, TID PRN, Frankiargeoff, Charan Batrocío, NP 
  insulin glargine (LANTUS) injection 20 Units, 20 Units, SubCUTAneous, BID, Polliard, Charan Batotoniels, NP, 20 Units at 04/23/21 0800   PARoxetine (PAXIL) tablet 20 mg, 20 mg, Oral, DAILY, Polliard, Joel Do T, NP, 20 mg at 04/23/21 0900 
  ranolazine ER (RANEXA) tablet 1,000 mg, 1,000 mg, Oral, BID, Polliard, Campbell Do T, NP, 1,000 mg at 04/23/21 0900   tafamidis cap 61 mg (Patient Supplied), 61 mg, Oral, DAILY, Christa Walter NP, 61 mg at 04/23/21 0900   warfarin dosing per pharmacy, , Other, Rx Dosing/Monitoring, Geeta Walter NP 
  miconazole (MICOTIN) 2 % powder, , Topical, BID, Yazmin Villa MD, Given at 04/23/21 0900 PATIENT CARE TEAM: 
Patient Care Team: 
Gee Montano NP as PCP - General (Nurse Practitioner) Stephan Matthews MD (Cardiology) Jae Johns MD as Physician (Cardiology) Thank you for allowing me to participate in this patient's care. [address] [place] [address] [place] [address], [address] Phone: [phone] Fax: [phone]

## 2021-04-23 NOTE — PROGRESS NOTES
0730: Bedside shift change report given to Mo Banks 90 (oncoming nurse) by Juliana Carpenter RN (offgoing nurse). Report included the following information SBAR and Kardex. 1900: LVAD dressing changed with sterile procedure. 1930: Bedside shift change report given to Mo Banks 90 (oncoming nurse) by Juliana Carpenter RN (offgoing nurse). Report included the following information SBAR and Kardex. Problem: Falls - Risk of 
Goal: *Absence of Falls Description: Document [place]vere [place] Fall Risk and appropriate interventions in the flowsheet. Outcome: Progressing Towards Goal 
Note: Fall Risk Interventions: 
Mobility Interventions: Communicate number of staff needed for ambulation/transfer Medication Interventions: Evaluate medications/consider consulting pharmacy Elimination Interventions: Bed/chair exit alarm, Call light in reach History of Falls Interventions: Bed/chair exit alarm, Door open when patient unattended Problem: Pressure Injury - Risk of 
Goal: *Prevention of pressure injury Description: Document Jaime Scale and appropriate interventions in the flowsheet. Outcome: Progressing Towards Goal 
Note: Pressure Injury Interventions: 
Sensory Interventions: Assess need for specialty bed Moisture Interventions: Apply protective barrier, creams and emollients Activity Interventions: Increase time out of bed, Pressure redistribution bed/mattress(bed type), Trapeze to reposition, PT/OT evaluation Mobility Interventions: HOB 30 degrees or less, Float heels, Pressure redistribution bed/mattress (bed type), PT/OT evaluation Nutrition Interventions: Document food/fluid/supplement intake Friction and Shear Interventions: Apply protective barrier, creams and emollients, Feet elevated on foot rest, Foam dressings/transparent film/skin sealants Problem: Patient Education: Go to Patient Education Activity Goal: Patient/Family Education Outcome: Progressing Towards Goal 
  
Problem: Heart Failure: Day 1 Goal: Off Pathway (Use only if patient is Off Pathway) Outcome: Progressing Towards Goal

## 2021-04-23 NOTE — PROGRESS NOTES
04/23/21 0251 CPAP/BIPAP Device Mode AVAP 
(Home Trilogy) AVAP Set Resp Rate 5 AVAP Set VT (ml) 500 Mask Type and Size Full face Skin Condition WNL IPAP (cm H2O) 30 cm H2O (PS Max 15  Min 4) EPAP (cm H2O) (Max 20  Min 5) Vt Spont (ml) 435 ml Ve Observed (l/min) 6.7 l/min Backup Rate 5 Total RR (Spontaneous) 16 breaths per minute Insp Rise Time (sec) 3 Pt's Home Machine Yes (type/vendor)

## 2021-04-23 NOTE — PROGRESS NOTES
SW stopped in to see patient and was met by daughter, Meño Bradley in the anti room. 6 Tony Bradley was tearful, she reported she and the patient had just received a poor prognosis regarding patient's medical condition; patient is in end stage renal failure and is a poor candidate for HD. SW entered into patient's room and she to was visibly upset, she stated she was told she had only about 6 months to live, she became tearful and stated she would not get to see her grandson grow up. TIMOTHY offered emotional support. TIMOTHY will continue to follow.

## 2021-04-24 NOTE — PROGRESS NOTES
Spiritual Care Assessment/Progress Note [address] 
 
 
NAME: Lisa Cheney      MRN: [id number] AGE: 78 y.o. SEX: female Rastafari Affiliation: Aries Slater  
Language: [place] 4/24/2021 Spiritual Assessment begun in [facility] 4 CV SERVICES UNIT through conversation with: 
  
    [x]Patient        [] Family    [] Friend(s) Reason for Consult: Palliative Care, Initial/Spiritual Assessment Spiritual beliefs: (Please include comment if needed) [x] Identifies with a mayela tradition:     
   [] Supported by a mayela community:        
   [] Claims no spiritual orientation:       
   [] Seeking spiritual identity:            
   [] Adheres to an individual form of spirituality:       
   [] Not able to assess:                   
 
    
Identified resources for coping:  
   [x] Prayer                           
   [] Music                  [] Guided Imagery [x] Family/friends                 [] Pet visits [] Devotional reading                         [] Unknown 
   [] Other:                                          
 
 
Interventions offered during this visit: (See comments for more details) Patient Interventions: Affirmation of emotions/emotional suffering, Catharsis/review of pertinent events in supportive environment, Iconic (affirming the presence of God/Higher Power), Initial/Spiritual assessment, patient floor, Prayer (actual), Prayer (assurance of) Family/Friend(s): Affirmation of emotions/emotional suffering, Initial Assessment, Prayer (actual), Prayer (assurance of) Plan of Care: 
 
 [x] Support spiritual and/or cultural needs  
 [] Support AMD and/or advance care planning process    
 [] Support grieving process 
 [] Coordinate Rites and/or Rituals  
 [] Coordination with community clergy  [] No spiritual needs identified at this time 
 [] Detailed Plan of Care below (See Comments)  [] Make referral to Music Therapy 
[] Make referral to Pet Therapy    
[] Make referral to Addiction services 
[] Make referral to [facility] 
[] Make referral to Spiritual Care Partner 
[] No future visits requested       
[x] Follow up upon further referrals Comments: Visited Ms Bishop Celestin in room 467 for initial Palliative Care spiritual assessment. Ms Bishop Celestin was lying quietly in bed and her daughter was at her bedside. They appeared in pretty good spirits. Provided pastoral presence and active listening as Ms Bishop Celestin shared that she remembered  from a previous admission. She denied having any concerns to share; she requested that  pray for all of those caring for her. Had prayer as requested and assured patient and daughter of ongoing  availability for support. : Rev. Otto Meigs. Conrad Dior; [facility], to contact [phone] Derrell Martinez call: [phone]

## 2021-04-24 NOTE — PROGRESS NOTES
Pharmacist Note  Warfarin Dosing Consult provided for this 78 y. o.female to manage warfarin for LVAD INR Goal: 1.8-2.5 per NP (lowered due to GIB) Home regimen/ tablet size: 4 mg daily per most recent prescription Drugs that may increase INR: None Drugs that may decrease INR: None Other current anticoagulants/ drugs that may increase bleeding risk: None Risk factors: Age > 72 Daily INR ordered: YES Recent Labs  
  04/24/21 
0432 04/23/21 
0447 04/22/21 
6550 04/22/21 
0258 HGB  --  7.4* 7.7*  --   
INR 2.7* 2.6*  --  2.2* Date               INR                  Dose 4/16  1.6  4 mg 4/17  1.8  4 mg 4/18  1.8  4 mg 4/19  1.8  4 mg  
4/20                 2.0                  4 mg 
4/21                 2.1                  4 mg 
4/22                 2.2                  4 mg 
4/23                 2.6                  2.5 mg 
4/24  2.7  2.5 mg 
                                                                            
Assessment/ Plan: 
INR remains slightly above goal. Will order warfarin 2.5 mg PO x 1 dose today. Pharmacy will continue to monitor daily and adjust therapy as indicated. Please contact the pharmacist at  for outpatient recommendations if needed. Viktor Oro, PharmD Clinical Pharmacist 
[facility] ()

## 2021-04-24 NOTE — PROGRESS NOTES
[address] [facility] Adult  Hospitalist Group Hospitalist Progress Note Froylan Davenport MD 
Answering service: [phone] OR [phone] from in house phone NAME:  Mark Clarke :  1950 MRN:  [id number] Admission Summary:  
Mark Clarke is a 78 y.o. female with a past medical history of heart failure s/p LVAD, COPD, CKD stage 3, pulmonary HTN, Depression, HTN, and HLD who presented to [facility] appointment with complaints of chest tightness and increasing dyspnea. She was instructed to come to [facility] [place] for admission for further treatment. At time of examination patient experiencing dyspnea but states she's feeling 100% better than what she felt yesterday. Interval history / Subjective:  
 
Patient seen and examined Heart failure team adjusting meds Creatinine continues to rise with diuresis Assessment & Plan:  
 
 Acute on Chronic systolic congestive heart failure , s/p LVAD 
- NYHA class IV ;  s/p LVAD heartMate 2 , increased speed per HF team  
- Advanced heart failure team following , anticoagulation with warfarin RH-  to measure heart pressures Aggressive diuresis  
  
COPD-  PRN nebs CKD stage 3 Creatinine elevated due to diuresis Nephrology consulted and managing Adjusting bumex doses/regimen Discussed dialysis with patient today 
  
DM  
- A1c 7.2  
- Monitor BG AC/HS  
- Continue lantus 20 units  
  
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  
  
JED - Continue home trilogy Depression - cont Paroxetine H/o breast cancer () · s/p bilateral mastectomy/chemo and endometrial cancer s/p hysterectomy Pancytopenia Etiology unknown at this point Hematology consulted for eval and tx Code status: full DVT prophylaxis: warfarin- therapeutic INR Care Plan discussed with: Patient/Family Anticipated Disposition: Home w/Family Anticipated Discharge: 24 hours to 48 hours Hospital Problems  Date Reviewed: 3/16/2021 Codes Class Noted POA Dyspnea ICD-10-CM: R06.00 
ICD-9-CM: 786.09  4/16/2021 Unknown Review of Systems: A comprehensive review of systems was negative except for that written in the HPI. Vital Signs:  
 Last 24hrs VS reviewed since prior progress note. Most recent are: 
Visit Vitals BP (!) 80/0 Pulse 84 Temp 99 °F (37.2 °C) Resp 20 Ht 5' 7\" (1.702 m) Wt 118 kg (260 lb 2.3 oz) SpO2 93% BMI 40.74 kg/m² Patient Vitals for the past 24 hrs: 
 Temp Pulse Resp SpO2  
04/23/21 2118    93 % 04/23/21 2045 99 °F (37.2 °C) 84 20 94 % 04/23/21 1600 98.1 °F (36.7 °C) 85 20 95 % 04/23/21 1200 98.1 °F (36.7 °C) 82 20 93 % 04/23/21 1058    95 % 04/23/21 0940 98.1 °F (36.7 °C) 85 21 96 % 04/23/21 0500 97.9 °F (36.6 °C) 84 22 96 % 04/23/21 0251    97 % Intake/Output Summary (Last 24 hours) at 4/23/2021 2342 Last data filed at 4/23/2021 1600 Gross per 24 hour Intake 730 ml Output 900 ml Net -170 ml Physical Examination:  
 
I had a face to face encounter with this patient and independently examined them on 04/23/21 as outlined below: 
     
Constitutional: NAD, awake and alert ENT:  Oral mucosa moist, Resp: Decreased breath sounds, no wheezing CV:  audible LVAD motor GI:  Soft, non distended, non tender. normoactive bowel sounds, no hepatosplenomegaly Musculoskeletal:  mod to severe edema, warm, wounds on bilat LE Neurologic:  Moves all extremities. AAOx3, CN II-XII reviewed Data Review:  
 Review and/or order of clinical lab test 
 
 
Labs:  
 
Recent Labs  
  04/23/21 
0447 04/22/21 
0527 WBC 2.8* 3.2* HGB 7.4* 7.7* HCT 25.5* 26.4*  
* 128* Recent Labs  
  04/23/21 
0447 04/22/21 
0258 04/21/21 
1359 04/21/21 
1358   138 137  137 --  134* K 4.0  3.9 4.1  4.1  --  4.0  
CL 97  97 98  97  --  97  
CO2 32  33* 34*  31  --  34* BUN 56*  57* 50*  50*  --  44* CREA 3.68*  3.63* 3.28*  3.38*  --  2.94* GLU 81  81 110*  113*  --  135* CA 9.0  9.3 9.0  8.9  --  9.2 MG 2.5* 2.4  --  2.3 PHOS 4.7 3.8 3.7 3.6 Recent Labs  
  04/23/21 0447 04/22/21 
0258 04/21/21 
1358 04/21/21 
0117 ALT 8* 9*  --  7* AP 50 52  --  54 TBILI 0.7 0.7  --  0.6 TP 7.9 7.9  --  7.8 ALB 4.0  4.0 4.0  4.0 3.9 3.4*  3.4*  
GLOB 3.9 3.9  --  4.4* Recent Labs  
  04/23/21 0447 04/22/21 
0258 04/21/21 
0117 INR 2.6* 2.2* 2.1* PTP 25.6* 22.4* 21.0* Recent Labs  
  04/21/21 
0117 TIBC 246* PSAT 10* Lab Results Component Value Date/Time Folate 10.0 10/28/2020 03:56 AM  
  
No results for input(s): PH, PCO2, PO2 in the last 72 hours. No results for input(s): CPK, CKNDX, TROIQ in the last 72 hours. No lab exists for component: CPKMB Lab Results Component Value Date/Time Cholesterol, total 129 04/16/2021 10:40 AM  
 HDL Cholesterol 33 04/16/2021 10:40 AM  
 LDL, calculated 74 04/16/2021 10:40 AM  
 Triglyceride 110 04/16/2021 10:40 AM  
 CHOL/HDL Ratio 3.9 04/16/2021 10:40 AM  
 
Lab Results Component Value Date/Time Glucose (POC) 180 (H) 04/23/2021 10:45 PM  
 Glucose (POC) 94 04/23/2021 05:46 PM  
 Glucose (POC) 89 04/23/2021 12:22 PM  
 Glucose (POC) 87 04/23/2021 07:35 AM  
 Glucose (POC) 111 (H) 04/22/2021 09:21 PM  
 
Lab Results Component Value Date/Time  Color YELLOW/STRAW 04/18/2021 05:08 PM  
 Appearance CLEAR 04/18/2021 05:08 PM  
 Specific gravity 1.016 04/18/2021 05:08 PM  
 Specific gravity 1.013 11/09/2020 12:05 AM  
 pH (UA) 6.5 04/18/2021 05:08 PM  
 Protein 30 (A) 04/18/2021 05:08 PM  
 Glucose Negative 04/18/2021 05:08 PM  
 Ketone Negative 04/18/2021 05:08 PM  
 Bilirubin Negative 04/18/2021 05:08 PM  
 Urobilinogen 0.2 04/18/2021 05:08 PM  
 Nitrites Negative 04/18/2021 05:08 PM  
 Leukocyte Esterase SMALL (A) 04/18/2021 05:08 PM  
 Epithelial cells FEW 04/18/2021 05:08 PM  
 Bacteria Negative 04/18/2021 05:08 PM  
 WBC 0-4 04/18/2021 05:08 PM  
 RBC 0-5 04/18/2021 05:08 PM  
 
 
 
Medications Reviewed:  
 
Current Facility-Administered Medications Medication Dose Route Frequency  gabapentin (NEURONTIN) capsule 200 mg  200 mg Oral TID  epoetin amalia-epbx (RETACRIT) injection 20,000 Units  20,000 Units SubCUTAneous Q MON, WED & FRI  bumetanide (BUMEX) injection 2 mg  2 mg IntraVENous BID  
 oxymetazoline (AFRIN) 0.05 % nasal spray 2 Spray  2 Spray Both Nostrils BID PRN  
 sodium chloride (OCEAN) 0.65 % nasal squeeze bottle 2 Spray  2 Spray Both Nostrils Q2H PRN  therapeutic multivitamin (THERAGRAN) tablet 1 Tab  1 Tab Oral DAILY  polyethylene glycol (MIRALAX) packet 17 g  17 g Oral DAILY  levothyroxine (SYNTHROID) tablet 25 mcg  25 mcg Oral 6am  
 isosorbide mononitrate ER (IMDUR) tablet 30 mg  30 mg Oral DAILY  cephALEXin (KEFLEX) capsule 250 mg  250 mg Oral BID  sodium chloride (NS) flush 5-40 mL  5-40 mL IntraVENous Q8H  
 sodium chloride (NS) flush 5-40 mL  5-40 mL IntraVENous PRN  
 acetaminophen (TYLENOL) tablet 650 mg  650 mg Oral Q4H PRN  
 hydrALAZINE (APRESOLINE) 20 mg/mL injection 10 mg  10 mg IntraVENous Q4H PRN  
 albuterol (PROVENTIL VENTOLIN) nebulizer solution 2.5 mg  2.5 mg Nebulization Q4H PRN  
 arformoterol 15 mcg/budesonide 0.5 mg neb solution   Nebulization BID RT  
 hydrALAZINE (APRESOLINE) tablet 100 mg  100 mg Oral TID  hydrOXYzine HCL (ATARAX) tablet 10 mg  10 mg Oral TID PRN  
 insulin glargine (LANTUS) injection 20 Units  20 Units SubCUTAneous BID  PARoxetine (PAXIL) tablet 20 mg  20 mg Oral DAILY  ranolazine ER (RANEXA) tablet 1,000 mg  1,000 mg Oral BID  tafamidis cap 61 mg (Patient Supplied)  61 mg Oral DAILY  warfarin dosing per pharmacy   Other Rx Dosing/Monitoring  miconazole (MICOTIN) 2 % powder Topical BID  
 
______________________________________________________________________ EXPECTED LENGTH OF STAY: 5d 7h 
ACTUAL LENGTH OF STAY:          7 Javad Lainez MD

## 2021-04-24 NOTE — PROGRESS NOTES
[address] [facility] Adult  Hospitalist Group Hospitalist Progress Note Floyd Velazquez MD 
Answering service: [phone] from in house phone NAME:  Vivien Arriaza :  1950 MRN:  [id number] Admission Summary:  
Vivien Arriaza is a 78 y.o. female with a past medical history of heart failure s/p LVAD, COPD, CKD stage 3, pulmonary HTN, Depression, HTN, and HLD who presented to [facility] appointment with complaints of chest tightness and increasing dyspnea. She was instructed to come to [facility] [place] for admission for further treatment. At time of examination patient experiencing dyspnea but states she's feeling 100% better than what she felt yesterday. Interval history / Subjective:  
 
Patient seen and examined Heart failure team adjusting meds Creatinine continues to rise with diuresis Assessment & Plan: Dyspnea due to acute on Chronic systolic congestive heart failure , s/p LVAD 
- NYHA class IV ;  s/p LVAD heartMate 2 , increased speed per HF team  
- Advanced heart failure team following [address]  On  Aggressive diuresis  
  
COPD-  PRN nebs CKD stage 3 Creatinine elevated due to diuresis Nephrology consulted and managing Adjusting bumex doses/regimen 
  
DM  
- A1c 7.2  
- Monitor BG AC/HS  
- Continue lantus 20 units  
  
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  
  
JED - Continue home trilogy 
  
Morbid Obesity - BMI 42.29 
- Weight loss management to be followed up outpatient Depression - cont Paroxetine H/o breast cancer () · s/p bilateral mastectomy/chemo and endometrial cancer s/p hysterectomy Atrial fibrillation Warfarin Pancytopenia Etiology unknown at this point Hematology consulted for eval and tx Code status: full DVT prophylaxis: warfarin- therapeutic INR Care Plan discussed with: Patient/Family Anticipated Disposition: Home w/Family Anticipated Discharge: 24 hours to 48 hours Hospital Problems  Date Reviewed: 3/16/2021 Codes Class Noted POA Dyspnea ICD-10-CM: R06.00 
ICD-9-CM: 786.09  4/16/2021 Unknown Review of Systems: A comprehensive review of systems was negative except for that written in the HPI. Vital Signs:  
 Last 24hrs VS reviewed since prior progress note. Most recent are: 
Visit Vitals BP (!) 80/0 Pulse 84 Temp 99 °F (37.2 °C) Resp 20 Ht 5' 7\" (1.702 m) Wt 118 kg (260 lb 2.3 oz) SpO2 93% BMI 40.74 kg/m² Intake/Output Summary (Last 24 hours) at 4/23/2021 2329 Last data filed at 4/23/2021 1600 Gross per 24 hour Intake 730 ml Output 900 ml Net -170 ml Physical Examination:  
 
I had a face to face encounter with this patient and independently examined them on 04/23/21 as outlined below: 
     
Constitutional: NAD, awake and alert ENT:  Oral mucosa moist, Resp: Decreased breath sounds, no wheezing CV:  audible LVAD motor GI:  Soft, non distended, non tender. normoactive bowel sounds, no hepatosplenomegaly Musculoskeletal:  mod to severe edema, warm, wounds on bilat LE Neurologic:  Moves all extremities. AAOx3, CN II-XII reviewed Data Review:  
 Review and/or order of clinical lab test 
 
 
Labs:  
 
Recent Labs  
  04/23/21 
0447 04/22/21 
0759 WBC 2.8* 3.2* HGB 7.4* 7.7* HCT 25.5* 26.4*  
* 128* Recent Labs  
  04/23/21 
0447 04/22/21 
0258 04/21/21 
1359 04/21/21 
1358   138 137  137  --  134* K 4.0  3.9 4.1  4.1  --  4.0  
CL 97  97 98  97  --  97  
CO2 32  33* 34*  31  --  34* BUN 56*  57* 50*  50*  --  44* CREA 3.68*  3.63* 3.28*  3.38*  --  2.94* GLU 81  81 110*  113*  --  135* CA 9.0  9.3 9.0  8.9  --  9.2 MG 2.5* 2.4  --  2.3 PHOS 4.7 3.8 3.7 3.6 Recent Labs 04/23/21 
7436 04/22/21 
0258 04/21/21 
1358 04/21/21 
0117 ALT 8* 9*  --  7* AP 50 52  --  54 TBILI 0.7 0.7  --  0.6 TP 7.9 7.9  --  7.8 ALB 4.0  4.0 4.0  4.0 3.9 3.4*  3.4*  
GLOB 3.9 3.9  --  4.4* Recent Labs  
  04/23/21 
0447 04/22/21 
0258 04/21/21 
0117 INR 2.6* 2.2* 2.1* PTP 25.6* 22.4* 21.0* Recent Labs  
  04/21/21 
0117 TIBC 246* PSAT 10* Lab Results Component Value Date/Time Folate 10.0 10/28/2020 03:56 AM  
  
No results for input(s): PH, PCO2, PO2 in the last 72 hours. No results for input(s): CPK, CKNDX, TROIQ in the last 72 hours. No lab exists for component: CPKMB Lab Results Component Value Date/Time Cholesterol, total 129 04/16/2021 10:40 AM  
 HDL Cholesterol 33 04/16/2021 10:40 AM  
 LDL, calculated 74 04/16/2021 10:40 AM  
 Triglyceride 110 04/16/2021 10:40 AM  
 CHOL/HDL Ratio 3.9 04/16/2021 10:40 AM  
 
Lab Results Component Value Date/Time Glucose (POC) 180 (H) 04/23/2021 10:45 PM  
 Glucose (POC) 94 04/23/2021 05:46 PM  
 Glucose (POC) 89 04/23/2021 12:22 PM  
 Glucose (POC) 87 04/23/2021 07:35 AM  
 Glucose (POC) 111 (H) 04/22/2021 09:21 PM  
 
Lab Results Component Value Date/Time Color YELLOW/STRAW 04/18/2021 05:08 PM  
 Appearance CLEAR 04/18/2021 05:08 PM  
 Specific gravity 1.016 04/18/2021 05:08 PM  
 Specific gravity 1.013 11/09/2020 12:05 AM  
 pH (UA) 6.5 04/18/2021 05:08 PM  
 Protein 30 (A) 04/18/2021 05:08 PM  
 Glucose Negative 04/18/2021 05:08 PM  
 Ketone Negative 04/18/2021 05:08 PM  
 Bilirubin Negative 04/18/2021 05:08 PM  
 Urobilinogen 0.2 04/18/2021 05:08 PM  
 Nitrites Negative 04/18/2021 05:08 PM  
 Leukocyte Esterase SMALL (A) 04/18/2021 05:08 PM  
 Epithelial cells FEW 04/18/2021 05:08 PM  
 Bacteria Negative 04/18/2021 05:08 PM  
 WBC 0-4 04/18/2021 05:08 PM  
 RBC 0-5 04/18/2021 05:08 PM  
 
 
 
Medications Reviewed:  
 
Current Facility-Administered Medications Medication Dose Route Frequency  gabapentin (NEURONTIN) capsule 200 mg  200 mg Oral TID  epoetin amalia-epbx (RETACRIT) injection 20,000 Units  20,000 Units SubCUTAneous Q MON, WED & FRI  bumetanide (BUMEX) injection 2 mg  2 mg IntraVENous BID  
 oxymetazoline (AFRIN) 0.05 % nasal spray 2 Spray  2 Spray Both Nostrils BID PRN  
 sodium chloride (OCEAN) 0.65 % nasal squeeze bottle 2 Spray  2 Spray Both Nostrils Q2H PRN  therapeutic multivitamin (THERAGRAN) tablet 1 Tab  1 Tab Oral DAILY  polyethylene glycol (MIRALAX) packet 17 g  17 g Oral DAILY  levothyroxine (SYNTHROID) tablet 25 mcg  25 mcg Oral 6am  
 isosorbide mononitrate ER (IMDUR) tablet 30 mg  30 mg Oral DAILY  cephALEXin (KEFLEX) capsule 250 mg  250 mg Oral BID  sodium chloride (NS) flush 5-40 mL  5-40 mL IntraVENous Q8H  
 sodium chloride (NS) flush 5-40 mL  5-40 mL IntraVENous PRN  
 acetaminophen (TYLENOL) tablet 650 mg  650 mg Oral Q4H PRN  
 hydrALAZINE (APRESOLINE) 20 mg/mL injection 10 mg  10 mg IntraVENous Q4H PRN  
 albuterol (PROVENTIL VENTOLIN) nebulizer solution 2.5 mg  2.5 mg Nebulization Q4H PRN  
 arformoterol 15 mcg/budesonide 0.5 mg neb solution   Nebulization BID RT  
 hydrALAZINE (APRESOLINE) tablet 100 mg  100 mg Oral TID  hydrOXYzine HCL (ATARAX) tablet 10 mg  10 mg Oral TID PRN  
 insulin glargine (LANTUS) injection 20 Units  20 Units SubCUTAneous BID  PARoxetine (PAXIL) tablet 20 mg  20 mg Oral DAILY  ranolazine ER (RANEXA) tablet 1,000 mg  1,000 mg Oral BID  tafamidis cap 61 mg (Patient Supplied)  61 mg Oral DAILY  warfarin dosing per pharmacy   Other Rx Dosing/Monitoring  miconazole (MICOTIN) 2 % powder   Topical BID  
 
______________________________________________________________________ EXPECTED LENGTH OF STAY: 5d 7h 
ACTUAL LENGTH OF STAY:          7 Maricel Calle MD

## 2021-04-24 NOTE — PROGRESS NOTES
[facility] 
 [address] [facility], [facility] [address] [facility] Phone: [phone]  
  
Nephrology Progress Note 
Royal Courser     1950     [id number] Date of Admission : 4/16/2021 04/24/21 CC: Follow up for ROCIO Assessment and Plan ROCIO on CKD 
- 2/2 CRS but now worse from diuresis (which she needed) - Cr stable , non oliguric 
-Will restart bumex again today 
- she is at the verge of dialysis but there is no urgent need for RRT today. she is expected to do poorly with dialysis. -Spoke to daughter at bedside. 
- labs daily. CKD stage III. - baseline around 2 
- presumed 2/2 DM, HTN. 
- Bone marrow Bx not done. - Renal US appearance of Kidneys no suggestive of Amyloidosis  
  
Chronic systolic CHF, stage C NYHA class IV 
-Combined ischemic and nonischemic cardiomyopathy 
-S/p HM 2 LVAD implant 4/15/2017 by Dr. Derrell Jose at [facility] 
- hx of recurrent VT : off mexiletine - chronic RV failure. 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 Chronic anemia :  
+ve PYP testing.  
- anemia of chronic disease  
- continue HEIKE Leucopenia, Thrombocytopenia   
Severe COPD. Pulmonary hypertension. Chronic A. fib. History of endometrial Ca ATTR amyloidosis Interval History: 
Seen and examined. Cr stable Lytes stable Fletcher placement with 500 cc of UOP. Review of Systems: A comprehensive review of systems was negative except for that written in the HPI. Current Medications:  
Current Facility-Administered Medications Medication Dose Route Frequency  bumetanide (BUMEX) injection 2 mg  2 mg IntraVENous TID  potassium chloride SR (KLOR-CON 10) tablet 20 mEq  20 mEq Oral DAILY  gabapentin (NEURONTIN) capsule 200 mg  200 mg Oral TID  epoetin amalia-epbx (RETACRIT) injection 20,000 Units  20,000 Units SubCUTAneous Q MON, WED & FRI  
 oxymetazoline (AFRIN) 0.05 % nasal spray 2 Spray  2 Spray Both Nostrils BID PRN  
 sodium chloride (OCEAN) 0.65 % nasal squeeze bottle 2 Spray  2 Spray Both Nostrils Q2H PRN  therapeutic multivitamin (THERAGRAN) tablet 1 Tab  1 Tab Oral DAILY  polyethylene glycol (MIRALAX) packet 17 g  17 g Oral DAILY  levothyroxine (SYNTHROID) tablet 25 mcg  25 mcg Oral 6am  
 isosorbide mononitrate ER (IMDUR) tablet 30 mg  30 mg Oral DAILY  cephALEXin (KEFLEX) capsule 250 mg  250 mg Oral BID  sodium chloride (NS) flush 5-40 mL  5-40 mL IntraVENous Q8H  
 sodium chloride (NS) flush 5-40 mL  5-40 mL IntraVENous PRN  
 acetaminophen (TYLENOL) tablet 650 mg  650 mg Oral Q4H PRN  
 hydrALAZINE (APRESOLINE) 20 mg/mL injection 10 mg  10 mg IntraVENous Q4H PRN  
 albuterol (PROVENTIL VENTOLIN) nebulizer solution 2.5 mg  2.5 mg Nebulization Q4H PRN  
 arformoterol 15 mcg/budesonide 0.5 mg neb solution   Nebulization BID RT  
 hydrALAZINE (APRESOLINE) tablet 100 mg  100 mg Oral TID  hydrOXYzine HCL (ATARAX) tablet 10 mg  10 mg Oral TID PRN  
 insulin glargine (LANTUS) injection 20 Units  20 Units SubCUTAneous BID  PARoxetine (PAXIL) tablet 20 mg  20 mg Oral DAILY  ranolazine ER (RANEXA) tablet 1,000 mg  1,000 mg Oral BID  tafamidis cap 61 mg (Patient Supplied)  61 mg Oral DAILY  warfarin dosing per pharmacy   Other Rx Dosing/Monitoring  miconazole (MICOTIN) 2 % powder   Topical BID Allergies Allergen Reactions  Benzodiazepines Other (comments) Respiratory issues Objective: 
Vitals:   
Vitals:  
 04/23/21 2356 04/24/21 0444 04/24/21 0846 04/24/21 1043 BP:      
Pulse: 84 78 71 Resp: 20 20 19 Temp: 98.5 °F (36.9 °C) 98.6 °F (37 °C) 97.7 °F (36.5 °C) SpO2: 95% 95% 95% Weight:    116.7 kg (257 lb 4.8 oz) Height:      
 
Intake and Output: 
No intake/output data recorded. 04/22 1901 - 04/24 0700 In: 1500 [P.O.:1250; I.V.:250] Out: 1250 [[id number]] Physical Examination: 
General: Morbidly obese, in NAD Tianan Lofty Neck: Supple,no mass palpable Lungs : CTA  
CVS: RRR, VAD sounds Abdomen: Soft, NT, BS +, obese Extremities: LE discoloration, LUE lymphedema Neurologic: AOOX 3  
 :purewick 
  
 
[]    High complexity decision making was performed 
[]    Patient is at high-risk of decompensation with multiple organ involvement Lab Data Personally Reviewed: I have reviewed all the pertinent labs, microbiology data and radiology studies during assessment. Recent Labs  
  04/24/21 
0432 04/23/21 
0447 04/22/21 
0258 04/21/21 
1359 04/21/21 
1358   137 138  138 137  137  --  134* K 3.8  3.8 4.0  3.9 4.1  4.1  --  4.0  
CL 98  97 97  97 98  97  --  97  
CO2 35*  35* 32  33* 34*  31  --  34* GLU 87  91 81  81 110*  113*  --  135* BUN 63*  60* 56*  57* 50*  50*  --  44* CREA 3.79*  3.76* 3.68*  3.63* 3.28*  3.38*  --  2.94* CA 9.0  9.0 9.0  9.3 9.0  8.9  --  9.2 MG 2.5* 2.5* 2.4  --  2.3 PHOS 4.1 4.7 3.8 3.7 3.6 ALB 4.0  3.8 4.0  4.0 4.0  4.0  --  3.9 ALT 8* 8* 9*  --   --   
INR 2.7* 2.6* 2.2*  --   --   
 
Recent Labs  
  04/23/21 0447 04/22/21 
5402 WBC 2.8* 3.2* HGB 7.4* 7.7* HCT 25.5* 26.4*  
* 128* No results found for: SDES Lab Results Component Value Date/Time Culture result: NO GROWTH AFTER 11 HOURS 04/23/2021 06:51 PM  
 Culture result: NO GROWTH 5 DAYS 11/10/2020 06:42 PM  
 Culture result: No Growth (<1000 cfu/mL) 11/09/2020 12:05 AM  
 Culture result: SCANT Morganella morganii ssp morganii (A) 10/30/2020 11:30 AM  
 Culture result: SCANT Morganella morganii ssp morganii (A) 10/30/2020 11:30 AM  
 Culture result: RARE DIPHTHEROIDS (A) 10/30/2020 11:30 AM  
 
Recent Results (from the past 24 hour(s)) GLUCOSE, POC Collection Time: 04/23/21 12:22 PM  
Result Value Ref Range Glucose (POC) 89 65 - 100 mg/dL Performed by Ariana Mccracken GLUCOSE, POC Collection Time: 04/23/21  5:46 PM  
Result Value Ref Range Glucose (POC) 94 65 - 100 mg/dL Performed by Jesi Bautista CULTURE, BLOOD, PAIRED Collection Time: 04/23/21  6:51 PM  
 Specimen: Blood Result Value Ref Range Special Requests: NO SPECIAL REQUESTS Culture result: NO GROWTH AFTER 11 HOURS    
GLUCOSE, POC Collection Time: 04/23/21 10:45 PM  
Result Value Ref Range Glucose (POC) 180 (H) 65 - 100 mg/dL Performed by Israel SALAZAR Collection Time: 04/24/21  4:32 AM  
Result Value Ref Range  81 - 246 U/L  
PROTHROMBIN TIME + INR Collection Time: 04/24/21  4:32 AM  
Result Value Ref Range INR 2.7 (H) 0.9 - 1.1 Prothrombin time 26.6 (H) 9.0 - 11.1 sec PROCALCITONIN Collection Time: 04/24/21  4:32 AM  
Result Value Ref Range Procalcitonin 0.27 ng/mL NT-PRO BNP Collection Time: 04/24/21  4:32 AM  
Result Value Ref Range NT pro-BNP 13,550 (H) <125 PG/ML  
LACTIC ACID Collection Time: 04/24/21  4:32 AM  
Result Value Ref Range Lactic acid 0.6 0.4 - 2.0 MMOL/L  
MAGNESIUM Collection Time: 04/24/21  4:32 AM  
Result Value Ref Range Magnesium 2.5 (H) 1.6 - 2.4 mg/dL METABOLIC PANEL, COMPREHENSIVE Collection Time: 04/24/21  4:32 AM  
Result Value Ref Range Sodium 137 136 - 145 mmol/L Potassium 3.8 3.5 - 5.1 mmol/L Chloride 98 97 - 108 mmol/L  
 CO2 35 (H) 21 - 32 mmol/L Anion gap 4 (L) 5 - 15 mmol/L Glucose 87 65 - 100 mg/dL BUN 63 (H) 6 - 20 MG/DL Creatinine 3.79 (H) 0.55 - 1.02 MG/DL  
 BUN/Creatinine ratio 17 12 - 20 GFR est AA 14 (L) >60 ml/min/1.73m2 GFR est non-AA 12 (L) >60 ml/min/1.73m2 Calcium 9.0 8.5 - 10.1 MG/DL Bilirubin, total 0.6 0.2 - 1.0 MG/DL  
 ALT (SGPT) 8 (L) 12 - 78 U/L  
 AST (SGOT) 15 15 - 37 U/L Alk. phosphatase 52 45 - 117 U/L Protein, total 8.0 6.4 - 8.2 g/dL Albumin 4.0 3.5 - 5.0 g/dL Globulin 4.0 2.0 - 4.0 g/dL A-G Ratio 1.0 (L) 1.1 - 2.2 RENAL FUNCTION PANEL Collection Time: 04/24/21  4:32 AM  
Result Value Ref Range  Sodium 137 136 - 145 mmol/L Potassium 3.8 3.5 - 5.1 mmol/L Chloride 97 97 - 108 mmol/L  
 CO2 35 (H) 21 - 32 mmol/L Anion gap 5 5 - 15 mmol/L Glucose 91 65 - 100 mg/dL BUN 60 (H) 6 - 20 MG/DL Creatinine 3.76 (H) 0.55 - 1.02 MG/DL  
 BUN/Creatinine ratio 16 12 - 20 GFR est AA 14 (L) >60 ml/min/1.73m2 GFR est non-AA 12 (L) >60 ml/min/1.73m2 Calcium 9.0 8.5 - 10.1 MG/DL Phosphorus 4.1 2.6 - 4.7 MG/DL Albumin 3.8 3.5 - 5.0 g/dL GLUCOSE, POC Collection Time: 04/24/21  6:58 AM  
Result Value Ref Range Glucose (POC) 103 (H) 65 - 100 mg/dL Performed by Christy Monge I have reviewed the flowsheets. Chart and Pertinent Notes have been reviewed. No change in PMH ,family and social history from Consult note.  
 
 
Marley Hinds MD

## 2021-04-24 NOTE — PROGRESS NOTES
[address] in [place], [place] Inpatient Consult Progress Note Patient name: Patrick Gallagher Patient : 1950 Patient MRN: [id number] Consulting MD: Bobby Santiago MD 
Date of service: 21 CHIEF COMPLAINT: 
Dyspnea 
  
PLAN: 
· RHC  showed adequate Sal CI 3.0 but severely elevated RA pressure 24 mmHg · Nephrology consult for worsening renal function; d/w Dr. Mat Saab - will continue IV Bumex today and place sahu for accurate urine measurement; no urgent need for RRT at this time · ID consult for pancytopenia and persistent sed rate appreciated; ?alterantive abx agent - recommend against stopping antibiotics altogether due to chronic SWI/suspected pump infection and high risk of clinical deterioration if infection is not suppressed · Hematology consult for pancytopenia appreciated · Palliative Care Consult to discuss goals and limits of care including HD - patient is a poor candidate for HD due to:  
· End stage HF with LVAD and RV failure · Chronic, active infection which would likely seed HD line · Difficult disposition if patient were to require outpatient HD (woud likely have to travel to [facility] several times per week to accomplish this) · Discussed with daughter (Sarahi), Luz Elena Godfrey at the bedside this AM; she and the patient understand concerns regarding patient's multiple co-morbidities, each with life limiting prognosis; they wish to pursue aggressive treatment including HD (if indicated) to prolong patient's life at home; goal is to spend time with her grandson, even if this means frequent trips to HD center. Reviewed today's plan of care including close UOP and lab monitoring, IV diuretics; will reassess in AM 
 
Continue increased device speed to 9600rpm due to LVEDD up to 8.3cm TTE  shows improved LVIDd, 6.68 cm with RVIDd 5.14 cm and TAPSE 1.1 cm Previously was intolerant to higher speeds due to VT; observe closely CXR today Bumex 2 mg IV TID Fletcher for strict I/O Intolerant to BB/ACEi/ARB/ARNI due to aTTR Continue tafamidis 61 mg PO daily Hydralazine 100 mg po TID, MAP above goal 70-90 mmHg in absence of palpable radial pulse   
Continue Imdur 30 mg PO daily Intolerant of spironolactone due to hyperkalemia  
Coumadin dose per pharmacy; goal INR lowered to 1.8-2.5 due to GIB Continue ranolazine, no ectopy - followed by Dr. Sukumar Santoyo OP Antibiotics for chronic sternal wound infection per ID - continue Keflex po Check FOB due to anemia TSH 3.99; continue Synthroid 25 mcg qAM  
Use home Trilogy when napping and QHS PFTs when euvolemic  
SLP consult due to dysphagia appreciated Continue Miralax daily while aggressively diuresing WOCN consult for BLE wounds PT/OT eval 
Palliative care consult appreciated; patient remains full code DM management per Hospitalist  
Up to date on flu, PNA, and COVID vaccinations CM to assist with dispo planning; patient has [facility] and home care aide already set up  
 
 
IMPRESSION: 
R leg cellulitis BLE edema Acute on chronic RV failure Coronary artery disease · [facility] (8/2016) high grade ramus and small PDA disease, borderline disease of LAD and takeoff of pRCA Chronic systolic heart failure · Stage C, NYHA class IV improved to IIIA symptoms with LVAD · Combined ischemic and non-ischemic cardiomyopathy, LVEF 15% · Mitral regurtigation, moderate to severe, resolved C/b cardiogenic shock s/p Impella bridge to LVAD S/p HeartMate 2 LVAD implantation (4/5/17 by Dr. Daniel Park) · C/b delayed extubation due to severe COPD 
· C/b critical illness polyneuropathy · C/b prolonged hospitalization post-LVAD, 55 days · C/b sternal wound infection s/p debridement (by Dr. Rose Medina) s/p wound vac · C/b sacral ulcer · Would culture positive for Staph aureus, not MRSA · C/b LVAD site drainage, improved S/p CRT-ICD · ICD fired due to electrolyte imbalance (2011) H/o breast cancer (1992) · s/p bilateral mastectomy/chemo and endometrial cancer s/p hysterectomy · Lymphedema of LLE due to cancer treatment Severe COPD with FEV1 50% Depression Atrial fibrillation H/o \"two mini strokes\" S/p fall with hip facture · Right hip hemmiarthroplasty (5/23/18) by Dr. Katelyn Galvan) · S/p removed hip hardwarare due to pain (4/15/19) COPD severe CKD, stage 4 Hyperkalemia, resolved Pulmonary hypertension Cardiac risk factors: · Morbid obesity, Body mass index is 42.29 kg/m². · DM2 insulin dependent · JED on Trilogy · HL Urinary incontinence, severe · no procedures due to anticoagulation Endometrial cancer (2002) HTN 
HL 
  
CARDIAC IMAGING: 
Echo (9/24/19) LVEF 20-25%, AV opens 1:1, no AR Echo (5/29/18) LVEF 10-%, ramps study done, report in [facility] Echo (1/9/18) ramp study done, LVEDD 7.1cm LHC (11/9/18) 2 vessel disease with 90% OM, 80% PDA, DSA to PDA branch 
  INTERVAL HISTORY: 
Inadequate diuresis Marginal UOP  
MAP, HR stable Cr up to 3.79 Hgb 7.4  
proBNP 10075 from 63640 INR 2.7 LVAD INTERROGATION: 
Device interrogated in person Device function normal, normal flow, no events LVAD Pump Speed (RPM): 3396 Pump Flow (LPM): 5.6 MAP: 81 
PI (Pulsitility Index): 2.1 Power: 6.2  Test: No 
Back Up  at Bedside & Labeled: Yes Power Module Test: No 
Driveline Site Care: No 
Driveline Dressing: Clean, Dry, and Intact Outpatient: No 
MAP in Therapeutic Range (Outpatient): Yes Testing Alarms Reviewed: Yes 
Back up SC speed: 9600 Back up Low Speed Limit: 9200 Emergency Equipment with Patient?: No 
Emergency procedures reviewed?: No 
Drive line site inspected?: No 
Drive line intergrity inspected?: Yes Drive line dressing changed?: No 
 
PHYSICAL EXAM: 
Visit Vitals BP (!) 80/0 Pulse 71 Temp 97.7 °F (36.5 °C) Resp 19 Ht 5' 7\" (1.702 m) Wt 257 lb 4.8 oz (116.7 kg) SpO2 95% BMI 40.30 kg/m² General: Patient is well developed, well-nourished in no acute distress HEENT: Normocephalic and atraumatic. No scleral icterus. Pupils are equal, round and reactive to light and accomodation. No conjunctival injection. Oropharynx is clear. Neck: Supple. No evidence of thyroid enlargements or lymphadenopathy. JVD: Cannot be appreciated Lungs: Breath sounds are equal and clear bilaterally. No wheezes, rhonchi, or rales. Heart: Regular rate and rhythm with normal S1 and S2. No murmurs, gallops or rubs. Abdomen: Soft, no mass or tenderness. No organomegaly or hernia. Bowel sounds present. Genitourinary and rectal: deferred Extremities: BLE + venous stasis Neurologic: No focal sensory or motor deficits are noted. Grossly intact. Psychiatric: Awake, alert an doriented x 3. Appropriate mood and affect. Skin: Warm, dry and well perfused. No lesions, nodules or rashes are noted. REVIEW OF SYSTEMS: 
General: Denies fever, night sweats. Ear, nose and throat: Denies difficulty hearing, sinus problems, runny nose, post-nasal drip, ringing in ears, mouth sores, loose teeth, ear pain, nosebleeds, sore throate, facial pain or numbess Cardiovascular: see above in the interval history Respiratory: Denies cough, wheezing, sputum production, hemoptysis. Gastrointestinal: Denies heartburn, constipation, intolerance to certain foods, diarrhea, abdominal pain, nausea, vomiting, difficulty swallowing, blood in stool Kidney and bladder: Denies painful urination, frequent urination, urgency, prostate problems and impotence Musculoskeletal: Denies joint pain, muscle weakness Skin and hair: Denies change in existing skin lesions, hair loss or increase, breast changes PAST MEDICAL HISTORY: 
Past Medical History:  
Diagnosis Date  Asthma  Cancer ([facility] [address] breast  
 Cancer ([facility] [address]   
 endometrial  
 Congestive heart failure, unspecified  CRI (chronic renal insufficiency)  Depression  Diabetes ([facility] [address]  Hypertension PAST SURGICAL HISTORY: 
Past Surgical History:  
Procedure Laterality Date  HX HERNIA REPAIR    
 HX HYSTERECTOMY  HX MASTECTOMY FAMILY HISTORY: 
No family history on file. SOCIAL HISTORY: 
Social History Socioeconomic History  Marital status:  Spouse name: Not on file  Number of children: Not on file  Years of education: Not on file  Highest education level: Not on file Tobacco Use  Smoking status: Never Smoker  Smokeless tobacco: Never Used Substance and Sexual Activity  Alcohol use: Not Currently LABORATORY RESULTS: 
  
Labs Latest Ref Rng & Units 4/24/2021 4/24/2021 4/23/2021 4/23/2021 4/22/2021 4/22/2021 4/22/2021 WBC 3.6 - 11.0 K/uL - - - 2. 8(L) 3. 2(L) - -  
RBC 3.80 - 5.20 M/uL - - - 2.85(L) 2.97(L) - - Hemoglobin 11.5 - 16.0 g/dL - - - 7. 4(L) 7. 7(L) - - Hematocrit 35.0 - 47.0 % - - - 25. 5(L) 26. 4(L) - - MCV 80.0 - 99.0 FL - - - 89.5 88.9 - - Platelets 978 - 987 K/uL - - - 130(L) 128(L) - - Lymphocytes 12 - 49 % - - - 15 - - - Monocytes 5 - 13 % - - - 9 - - - Eosinophils 0 - 7 % - - - 0 - - - Basophils 0 - 1 % - - - 0 - - - Albumin 3.5 - 5.0 g/dL 4.0 3.8 4.0 4.0 - 4.0 4.0 Calcium 8.5 - 10.1 MG/DL 9.0 9.0 9.0 9.3 - 9.0 8.9 Glucose 65 - 100 mg/dL 87 91 81 81 - 110(H) 113(H) BUN 6 - 20 MG/DL 63(H) 60(H) 56(H) 57(H) - 50(H) 50(H) Creatinine 0.55 - 1.02 MG/DL 3.79(H) 3.76(H) 3.68(H) 3.63(H) - 3.28(H) 3.38(H) Sodium 136 - 145 mmol/L 137 137 138 138 - 137 137 Potassium 3.5 - 5.1 mmol/L 3.8 3.8 4.0 3.9 - 4.1 4.1 TSH 0.36 - 3.74 uIU/mL - - - - - - -  
LDH 81 - 246 U/L - 164 - 177 - - 176 Some recent data might be hidden Lab Results Component Value Date/Time TSH 3.99 (H) 04/17/2021 04:54 AM  
 TSH 2.980 10/01/2020 12:00 AM  
 
 
ALLERGY: 
Allergies Allergen Reactions  Benzodiazepines Other (comments) Respiratory issues CURRENT MEDICATIONS: 
 
Current Facility-Administered Medications:  bumetanide (BUMEX) injection 2 mg, 2 mg, IntraVENous, TID, PollWing Domingo torres NP 
  gabapentin (NEURONTIN) capsule 200 mg, 200 mg, Oral, TID, Olga De Leon MD, 200 mg at 04/24/21 1004   epoetin amalia-epbx (RETACRIT) injection 20,000 Units, 20,000 Units, SubCUTAneous, Q MON, WED & Gladis Ann MD, 20,000 Units at 04/23/21 2257   oxymetazoline (AFRIN) 0.05 % nasal spray 2 Spray, 2 Spray, Both Nostrils, BID PRN, Wing Domingo Walter NP, 2 Sells at 04/22/21 1800 
  sodium chloride (OCEAN) 0.65 % nasal squeeze bottle 2 Spray, 2 Spray, Both Nostrils, Q2H PRN, Wing Domingo Walter NP 
  therapeutic multivitamin (THERAGRAN) tablet 1 Tab, 1 Tab, Oral, DAILY, Wing Domingo Walter NP, 1 Tab at 04/24/21 1004   polyethylene glycol (MIRALAX) packet 17 g, 17 g, Oral, DAILY, PolliarBessy tellez NP, 17 g at 04/24/21 1005   levothyroxine (SYNTHROID) tablet 25 mcg, 25 mcg, Oral, 6am, Wing Domingo Walter NP, 25 mcg at 04/24/21 5632   isosorbide mononitrate ER (IMDUR) tablet 30 mg, 30 mg, Oral, DAILY, PollBessy torres NP, 30 mg at 04/24/21 1004   cephALEXin (KEFLEX) capsule 250 mg, 250 mg, Oral, BID, Elena Rojo MD, 250 mg at 04/24/21 1005   sodium chloride (NS) flush 5-40 mL, 5-40 mL, IntraVENous, Q8H, Bessy Walter NP, 10 mL at 04/24/21 1910   sodium chloride (NS) flush 5-40 mL, 5-40 mL, IntraVENous, PRN, Bessy Walter NP, 10 mL at 04/22/21 1238   acetaminophen (TYLENOL) tablet 650 mg, 650 mg, Oral, Q4H PRN, Wing Domingo Walter NP, 650 mg at 04/18/21 9486   hydrALAZINE (APRESOLINE) 20 mg/mL injection 10 mg, 10 mg, IntraVENous, Q4H PRN, Bessy Walter NP, 10 mg at 04/21/21 3351   albuterol (PROVENTIL VENTOLIN) nebulizer solution 2.5 mg, 2.5 mg, Nebulization, Q4H PRN, Wing Domingo Walter NP 
  arformoterol 15 mcg/budesonide 0.5 mg neb solution, , Nebulization, BID RT, Wing Domingo Walter NP, Stopped at 04/24/21 0520   hydrALAZINE (APRESOLINE) tablet 100 mg, 100 mg, Oral, TID, Crissy Man NP, 100 mg at 04/24/21 1004   hydrOXYzine HCL (ATARAX) tablet 10 mg, 10 mg, Oral, TID PRN, Anjali Walter NP 
  insulin glargine (LANTUS) injection 20 Units, 20 Units, SubCUTAneous, BID, Anjali Walter NP, 20 Units at 04/24/21 1005   PARoxetine (PAXIL) tablet 20 mg, 20 mg, Oral, DAILY, Dilcia Walter NP, 20 mg at 04/24/21 1004   ranolazine ER (RANEXA) tablet 1,000 mg, 1,000 mg, Oral, BID, Dilcia Walter, TEA, 1,000 mg at 04/24/21 1005   tafamidis cap 61 mg (Patient Supplied), 61 mg, Oral, DAILY, Dilcia Walter, NP, 61 mg at 04/24/21 1009   warfarin dosing per pharmacy, , Other, Rx Dosing/Monitoring, Anjali Walter NP 
  miconazole (MICOTIN) 2 % powder, , Topical, BID, Denise Lei MD, Given at 04/24/21 1005 PATIENT CARE TEAM: 
Patient Care Team: 
Galindo Chisholm NP as PCP - General (Nurse Practitioner) Narda Gonzales MD (Cardiology) Cherilyn Landau, MD as Physician (Cardiology) Thank you for allowing me to participate in this patient's care. [address], [address] Phone: [phone] Fax: [phone]

## 2021-04-24 NOTE — PROGRESS NOTES
0730: Bedside shift change report received by [address] [facility] [address] [facility] (offgoing nurse). Report included the following information SBAR, Kardex, Procedure Summary, Intake/Output, MAR, Recent Results, and Cardiac Rhythm paced . 1930: Bedside shift change report given to CVICU RN (oncoming nurse). Report included the following information SBAR, Kardex, Procedure Summary, Intake/Output, MAR, Recent Results and Cardiac Rhythm Paced.  
 
------------------- Care Plan 8168 Problem: Falls - Risk of 
Goal: *Absence of Falls Description: Document [place] Fall Risk and appropriate interventions in the flowsheet. Outcome: Progressing Towards Goal 
Note: Fall Risk Interventions: 
Mobility Interventions: Communicate number of staff needed for ambulation/transfer, OT consult for ADLs, Patient to call before getting OOB, PT Consult for mobility concerns, PT Consult for assist device competence Medication Interventions: Evaluate medications/consider consulting pharmacy, Patient to call before getting OOB, Teach patient to arise slowly Elimination Interventions: Call light in reach, Patient to call for help with toileting needs, Stay With Me (per policy), Toileting schedule/hourly rounds History of Falls Interventions: Consult care management for discharge planning Problem: Pressure Injury - Risk of 
Goal: *Prevention of pressure injury Description: Document Jaime Scale and appropriate interventions in the flowsheet. Outcome: Progressing Towards Goal 
Note: Pressure Injury Interventions: 
Sensory Interventions: Float heels, Keep linens dry and wrinkle-free, Minimize linen layers, Pressure redistribution bed/mattress (bed type), Turn and reposition approx. every two hours (pillows and wedges if needed) Moisture Interventions: Internal/External urinary devices, Maintain skin hydration (lotion/cream), Minimize layers Activity Interventions: Increase time out of bed, Pressure redistribution bed/mattress(bed type), PT/OT evaluation Mobility Interventions: HOB 30 degrees or less, Pressure redistribution bed/mattress (bed type), PT/OT evaluation, Turn and reposition approx. every two hours(pillow and wedges) Nutrition Interventions: Document food/fluid/supplement intake Friction and Shear Interventions: Lift sheet

## 2021-04-25 NOTE — PROGRESS NOTES
[address] [facility] Adult  Hospitalist Group Hospitalist Progress Note Shelly Smith MD 
Answering service: [phone] from in house phone NAME:  Lang Andrews :  1950 MRN:  [id number] Admission Summary:  
Lang Andrews is a 78 y.o. female with a past medical history of heart failure s/p LVAD, COPD, CKD stage 3, pulmonary HTN, Depression, HTN, and HLD who presented to F appointment with complaints of chest tightness and increasing dyspnea. She was instructed to come to [address] [place] [address] for admission for further treatment. At time of examination patient experiencing dyspnea but states she's feeling 100% better than what she felt yesterday. Interval history / Subjective:  
 
Patient seen and examined Heart failure team adjusting meds Creatinine elevated but stable Severe tremors- stopping nebs and synthroid- resume home inhalers Assessment & Plan:  
 
 Acute on Chronic systolic congestive heart failure , s/p LVAD 
- NYHA class IV ;  s/p LVAD heartMate 2 , increased speed per HF team  
- Advanced heart failure team following and adjusting meds , anticoagulation with warfarin [facility] [address]-  to measure heart pressures  
  
COPD-  Stopped nebs- resume home inhalers 
symbicort and albuterol Severe tremors- unclear etiology- stopped nebs and thyroid med Subclinical hypothyroidism- normal free T4 with minimally elevated TSH Do not recommend any treatment or thyroid meds for these labs- DC synthroid Constipation with KUB suggesting fecal impaction- suppository ordered CKD stage 3 Creatinine elevated due to diuresis Nephrology consulted and managing Adjusting bumex doses/regimen 
  
DM  
- A1c 7.2  
- Monitor BG AC/HS  
- Continue lantus 20 units  
  
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  
  JED  
- Continue home trilogy Depression - cont Paroxetine H/o breast cancer (1992) · s/p bilateral mastectomy/chemo and endometrial cancer s/p hysterectomy Pancytopenia Etiology unknown at this point Hematology consulted for eval and tx Code status: full DVT prophylaxis: warfarin- therapeutic INR Care Plan discussed with: Patient/Family Anticipated Disposition: Home w/Family Anticipated Discharge: 24 hours to 48 hours Hospital Problems  Date Reviewed: 3/16/2021 Codes Class Noted POA Dyspnea ICD-10-CM: R06.00 
ICD-9-CM: 786.09  4/16/2021 Unknown Review of Systems: A comprehensive review of systems was negative except for that written in the HPI. Vital Signs:  
 Last 24hrs VS reviewed since prior progress note. Most recent are: 
Visit Vitals BP (!) 80/0 Pulse 97 Temp 99.2 °F (37.3 °C) Resp 25 Ht 5' 7\" (1.702 m) Wt 116.7 kg (257 lb 4.8 oz) SpO2 93% BMI 40.30 kg/m² Patient Vitals for the past 24 hrs: 
 Temp Pulse Resp SpO2  
04/24/21 2000 99.2 °F (37.3 °C) 97 25 93 % 04/24/21 1505 98.6 °F (37 °C) 88 22 92 % 04/24/21 1143 98.2 °F (36.8 °C) 75 23 93 % 04/24/21 0846 97.7 °F (36.5 °C) 71 19 95 % 04/24/21 0444 98.6 °F (37 °C) 78 20 95 % 04/23/21 2356 98.5 °F (36.9 °C) 84 20 95 % Intake/Output Summary (Last 24 hours) at 4/24/2021 2334 Last data filed at 4/24/2021 2200 Gross per 24 hour Intake 720 ml Output 725 ml Net -5 ml Physical Examination:  
 
I had a face to face encounter with this patient and independently examined them on 04/24/21 as outlined below: 
     
Constitutional: NAD, awake and alert ENT:  Oral mucosa moist, Resp: Decreased breath sounds, no wheezing CV:  audible LVAD motor GI:  Soft, non distended, non tender. normoactive bowel sounds, no hepatosplenomegaly Musculoskeletal:  mild-mod edema, warm, wounds on bilat LE Neurologic:  Moves all extremities.   AAOx3, CN II-XII reviewed, diffuse tremors in upper and lower extremities. Data Review:  
 Review and/or order of clinical lab test 
 
 
Labs:  
 
Recent Labs  
  04/23/21 0447 04/22/21 
2297 WBC 2.8* 3.2* HGB 7.4* 7.7* HCT 25.5* 26.4*  
* 128* Recent Labs  
  04/24/21 0432 04/23/21 0447 04/22/21 
0258   137 138  138 137  137  
K 3.8  3.8 4.0  3.9 4.1  4.1 CL 98  97 97  97 98  97 CO2 35*  35* 32  33* 34*  31 BUN 63*  60* 56*  57* 50*  50* CREA 3.79*  3.76* 3.68*  3.63* 3.28*  3.38* GLU 87  91 81  81 110*  113* CA 9.0  9.0 9.0  9.3 9.0  8.9 MG 2.5* 2.5* 2.4 PHOS 4.1 4.7 3.8 Recent Labs  
  04/24/21 0432 04/23/21 0447 04/22/21 
0258 ALT 8* 8* 9* AP 52 50 52 TBILI 0.6 0.7 0.7 TP 8.0 7.9 7.9 ALB 4.0  3.8 4.0  4.0 4.0  4.0  
GLOB 4.0 3.9 3.9 Recent Labs  
  04/24/21 0432 04/23/21 0447 04/22/21 
0258 INR 2.7* 2.6* 2.2* PTP 26.6* 25.6* 22.4* No results for input(s): FE, TIBC, PSAT, FERR in the last 72 hours. Lab Results Component Value Date/Time Folate 10.0 10/28/2020 03:56 AM  
  
No results for input(s): PH, PCO2, PO2 in the last 72 hours. No results for input(s): CPK, CKNDX, TROIQ in the last 72 hours. No lab exists for component: CPKMB Lab Results Component Value Date/Time Cholesterol, total 129 04/16/2021 10:40 AM  
 HDL Cholesterol 33 04/16/2021 10:40 AM  
 LDL, calculated 74 04/16/2021 10:40 AM  
 Triglyceride 110 04/16/2021 10:40 AM  
 CHOL/HDL Ratio 3.9 04/16/2021 10:40 AM  
 
Lab Results Component Value Date/Time Glucose (POC) 130 (H) 04/24/2021 08:32 PM  
 Glucose (POC) 92 04/24/2021 04:52 PM  
 Glucose (POC) 103 (H) 04/24/2021 06:58 AM  
 Glucose (POC) 180 (H) 04/23/2021 10:45 PM  
 Glucose (POC) 94 04/23/2021 05:46 PM  
 
Lab Results Component Value Date/Time  Color YELLOW/STRAW 04/18/2021 05:08 PM  
 Appearance CLEAR 04/18/2021 05:08 PM  
 Specific gravity 1.016 04/18/2021 05:08 PM  
 Specific gravity 1.013 11/09/2020 12:05 AM  
 pH (UA) 6.5 04/18/2021 05:08 PM  
 Protein 30 (A) 04/18/2021 05:08 PM  
 Glucose Negative 04/18/2021 05:08 PM  
 Ketone Negative 04/18/2021 05:08 PM  
 Bilirubin Negative 04/18/2021 05:08 PM  
 Urobilinogen 0.2 04/18/2021 05:08 PM  
 Nitrites Negative 04/18/2021 05:08 PM  
 Leukocyte Esterase SMALL (A) 04/18/2021 05:08 PM  
 Epithelial cells FEW 04/18/2021 05:08 PM  
 Bacteria Negative 04/18/2021 05:08 PM  
 WBC 0-4 04/18/2021 05:08 PM  
 RBC 0-5 04/18/2021 05:08 PM  
 
 
 
Medications Reviewed:  
 
Current Facility-Administered Medications Medication Dose Route Frequency  bumetanide (BUMEX) injection 2 mg  2 mg IntraVENous TID  potassium chloride SR (KLOR-CON 10) tablet 20 mEq  20 mEq Oral DAILY  [START ON 4/25/2021] OTHER(NON-FORMULARY) 2 Puff  2 Puff Inhalation BID  albuterol (PROVENTIL HFA, VENTOLIN HFA, PROAIR HFA) inhaler 2 Puff  2 Puff Inhalation Q4H PRN  
 gabapentin (NEURONTIN) capsule 200 mg  200 mg Oral TID  epoetin amalia-epbx (RETACRIT) injection 20,000 Units  20,000 Units SubCUTAneous Q MON, WED & FRI  
 oxymetazoline (AFRIN) 0.05 % nasal spray 2 Spray  2 Spray Both Nostrils BID PRN  
 sodium chloride (OCEAN) 0.65 % nasal squeeze bottle 2 Spray  2 Spray Both Nostrils Q2H PRN  therapeutic multivitamin (THERAGRAN) tablet 1 Tab  1 Tab Oral DAILY  polyethylene glycol (MIRALAX) packet 17 g  17 g Oral DAILY  isosorbide mononitrate ER (IMDUR) tablet 30 mg  30 mg Oral DAILY  cephALEXin (KEFLEX) capsule 250 mg  250 mg Oral BID  sodium chloride (NS) flush 5-40 mL  5-40 mL IntraVENous Q8H  
 sodium chloride (NS) flush 5-40 mL  5-40 mL IntraVENous PRN  
 acetaminophen (TYLENOL) tablet 650 mg  650 mg Oral Q4H PRN  
 hydrALAZINE (APRESOLINE) 20 mg/mL injection 10 mg  10 mg IntraVENous Q4H PRN  
 hydrALAZINE (APRESOLINE) tablet 100 mg  100 mg Oral TID  hydrOXYzine HCL (ATARAX) tablet 10 mg  10 mg Oral TID PRN  
 insulin glargine (LANTUS) injection 20 Units  20 Units SubCUTAneous BID  PARoxetine (PAXIL) tablet 20 mg  20 mg Oral DAILY  ranolazine ER (RANEXA) tablet 1,000 mg  1,000 mg Oral BID  tafamidis cap 61 mg (Patient Supplied)  61 mg Oral DAILY  warfarin dosing per pharmacy   Other Rx Dosing/Monitoring  miconazole (MICOTIN) 2 % powder   Topical BID  
 
______________________________________________________________________ EXPECTED LENGTH OF STAY: 5d 7h 
ACTUAL LENGTH OF STAY:          8 Maricel Calle MD

## 2021-04-25 NOTE — PROGRESS NOTES
2000: Received report from [facility]. Pt's daughter at bedside. Pt having jerking movements, MD aware. 2100: Pt's daughter voicing concerns about plan of care and treatment team, opportunities for questions provided at this time. Pt's daughter requesting to restart home inhaler. Enquired to Armand Walker NP about Symbicort/proair inhaler pt uses at home- he will wait to read Dr. Dawit Dill note from today then place order accordingly. 0400: Aries Corbett, Hospitalist NP made aware of decreased H&H value. No obvious signs of bleeding, VSS, pt resting in bed with trilogy mask in place. NP also made aware of increase in jerk like motions although pt remains restful. No new orders. 0449: 1 unit PRBC order placed per Eddie Van NP. Will obtain blood consent and send T&C now. 0720: 1 unit PRBC transfusing now. 0745: Pt assisted onto bed pan, no BM passed just gas. Pt cleaned up. 
 
0800: Bedside shift change report given to Mo Ventura (oncoming nurse) by Frannie Soler (offgoing nurse). Report included the following information SBAR, ED Summary, Med Rec Status and Cardiac Rhythm Paced.

## 2021-04-25 NOTE — CONSULTS
NEUROLOGY  
INPATIENT EVALUATION/CONSULTATION 
  
 
PATIENT NAME: Roxanne Orta MRN: [id number] REASON FOR CONSULTATION:  
 
04/25/21 HISTORY OF PRESENT ILLNESS: 
Roxanne Orta is a 78 y.o. right-hand-dominant female history of systolic cardiomyopathy status post LVAD treatment admitted for complications related to this including worsening renal function. Patient was admitted for ongoing management with cardiology initially mentioning need for dialysis with nephrology attempting to hold off on this. Over the past few days patient has become more tremulous with daughter describing rather prominent tremors of her legs coming off the bed as well as significant head involvement last night. These tremors appear to resolved after patient got a transfusion of packed red blood cells. Daughter mentions that in the past in the setting of worsening renal function and anemia patient had similar symptoms which resolved with improvement in renal function as well as transfusion of packed red blood cells. Other than feeling a little bit \"jerky\" in the days before presentation, patient denies any history of tremor. Primary team had discontinued Synthroid as well as an inhaler thinking this may be contributing from medical standpoint without improvement noted. She remains with some degree of tremor though markedly improved from prior. PAST MEDICAL HISTORY: 
Past Medical History:  
Diagnosis Date  Asthma  Cancer ([facility] [address] breast  
 Cancer ([facility] [address]   
 endometrial  
 Congestive heart failure, unspecified  CRI (chronic renal insufficiency)  Depression  Diabetes ([facility] [address]  Hypertension PAST SURGICAL HISTORY: 
Past Surgical History:  
Procedure Laterality Date  HX HERNIA REPAIR    
 HX HYSTERECTOMY  HX MASTECTOMY FAMILY HISTORY:  
No family history on file. SOCIAL HISTORY: 
Social History Socioeconomic History  Marital status:    Spouse name: Not on file  Number of children: Not on file  Years of education: Not on file  Highest education level: Not on file Tobacco Use  Smoking status: Never Smoker  Smokeless tobacco: Never Used Substance and Sexual Activity  Alcohol use: Not Currently MEDICATIONS:  
Current Facility-Administered Medications Medication Dose Route Frequency Provider Last Rate Last Admin  
 0.9% sodium chloride infusion 250 mL  250 mL IntraVENous PRN Sameer Del Cid NP      
 warfarin (COUMADIN) tablet 2.5 mg  2.5 mg Oral ONCE Roxie Mcclellan MD      
 [facility]) injection 2 mg  2 mg IntraVENous TID Luann Walter NP   2 mg at 04/25/21 1015  potassium chloride SR (KLOR-CON 10) tablet 20 mEq  20 mEq Oral DAILY Luann Walter NP   20 mEq at 04/25/21 1015  albuterol (PROVENTIL HFA, VENTOLIN HFA, PROAIR HFA) inhaler 2 Puff  2 Puff Inhalation Q4H PRN Roxie Mcclellan MD      
 OTHER(NON-FORMULARY) 1 Puff  1 Puff Inhalation BID Roxie Mcclellan MD   1 Puff at 04/25/21 0900  epoetin amalia-epbx (RETACRIT) injection 20,000 Units  20,000 Units SubCUTAneous Q MON, WED & Katelyn Eric MD   20,000 Units at 04/23/21 2257  oxymetazoline (AFRIN) 0.05 % nasal spray 2 Spray  2 Spray Both Nostrils BID PRN Luann Walter NP   2 Spray at 04/22/21 1800  
 sodium chloride (OCEAN) 0.65 % nasal squeeze bottle 2 Spray  2 Spray Both Nostrils Q2H PRN Luann Walter NP      
 therapeutic multivitamin [facility]) tablet 1 Tab  1 Tab Oral DAILY Luann Walter NP   1 Tab at 04/25/21 1015  polyethylene glycol (MIRALAX) packet 17 g  17 g Oral DAILY Luann Walter NP   17 g at 04/24/21 1005  isosorbide mononitrate ER (IMDUR) tablet 30 mg  30 mg Oral DAILY Luann Walter NP   30 mg at 04/25/21 1015  cephALEXin (KEFLEX) capsule 250 mg  250 mg Oral BID Shella Barthel, MD   250 mg at 04/25/21 1016  sodium chloride (NS) flush 5-40 mL  5-40 mL IntraVENous Q8H Steve Grant, NP   10 mL at 04/24/21 1239  
 sodium chloride (NS) flush 5-40 mL  5-40 mL IntraVENous PRN Lucina Walter, NP   10 mL at 04/22/21 1238  acetaminophen (TYLENOL) tablet 650 mg  650 mg Oral Q4H PRN Lucina Walter, NP   650 mg at 04/18/21 7729  hydrALAZINE (APRESOLINE) 20 mg/mL injection 10 mg  10 mg IntraVENous Q4H PRN Lucina Walter, NP   10 mg at 04/21/21 9151  hydrALAZINE (APRESOLINE) tablet 100 mg  100 mg Oral TID Lucina Walter, NP   100 mg at 04/25/21 1015  hydrOXYzine HCL (ATARAX) tablet 10 mg  10 mg Oral TID PRN Lucina Walter, NP      
 insulin glargine (LANTUS) injection 20 Units  20 Units SubCUTAneous BID Lucina Walter NP   20 Units at 04/25/21 1023  PARoxetine (PAXIL) tablet 20 mg  20 mg Oral DAILY Lucina Walter, NP   20 mg at 04/25/21 1015  
 ranolazine ER (RANEXA) tablet 1,000 mg  1,000 mg Oral BID Lucina Walter, NP   1,000 mg at 04/25/21 1015  tafamidis cap 61 mg (Patient Supplied)  61 mg Oral DAILY Lucina Walter, NP   61 mg at 04/25/21 1027  warfarin dosing per pharmacy   Other Rx Dosing/Monitoring Lucina Walter NP      
 miconazole (MICOTIN) 2 % powder   Topical BID Toribio Goodpasture, MD   Given at 04/25/21 0900 ALLERGIES: 
Allergies Allergen Reactions  Benzodiazepines Other (comments) Respiratory issues REVIEW OF SYSTEMS: 
10 point ROS reviewed with patient and negative except for those listed above. PHYSICAL EXAM: 
Vital Signs:  
Visit Vitals BP (!) 80/0 Pulse 77 Temp 98.1 °F (36.7 °C) Resp 22 Ht 5' 7\" (1.702 m) Wt 257 lb 4.8 oz (116.7 kg) SpO2 97% BMI 40.30 kg/m² Physical examination: 
Pleasant female resting comfortably in bed with intermittent multifocal myoclonus noted throughout the encounter. HEENT appears grossly unremarkable. Neck appears supple. Cardiovascular is notable for electronic oral of LVAD otherwise unremarkable.   Pulmonary demonstrates equal entry bilaterally. Abdomen is nondistended. Extremities appear warm/dry. Neurologically patient appears alert and oriented attention appears intact. Speech is clear, language fluent. Cranials two through twelve appear grossly intact there is no myoclonus/adventitious movements affecting this area. Patient has grossly 5 out of 5 strength for condition in upper extremities with asterixis noted at the hands, multifocal myoclonus affecting the biceps and deltoid frequently. In the lower extremities, patient appears to have at least 4-5 strength bilaterally interrupted by myoclonic jerks. Sensation is grossly intact. Coordination is intact in the upper extremities fine moderate amplitude, limb to tremor noted with arms outstretched. Remainder of examination is deferred. PERTINENT DATA: 
[place] 38 CT Results (maximum last 3): Results from Hospital Encounter encounter on 11/09/20 CT HEAD WO CONT Narrative EXAM: CT HEAD WO CONT INDICATION: AMS, involuntary muscular movement COMPARISON: None. CONTRAST: None. TECHNIQUE: Unenhanced CT of the head was performed using 5 mm images. Brain and 
bone windows were generated. Coronal and sagittal reformats. CT dose reduction 
was achieved through use of a standardized protocol tailored for this 
examination and automatic exposure control for dose modulation. FINDINGS: 
Ventricles and sulci are within normal limits. No hemorrhage or acute infarction 
is identified. Is 1.1 cm high density lesion based against the posterior frontal 
bone most likely meningioma. The bone windows demonstrate no abnormalities. The visualized portions of the 
paranasal sinuses and mastoid air cells are clear. Impression IMPRESSION:  
No acute abnormality identified. Incidentally noted is a 1.1 cm meningioma. Results from [facility] Encounter encounter on 11/08/20 CT ABD PELV WO CONT Narrative PROCEDURE: CT ABD PELV WO CONT 
 
HISTORY:Flank pain COMPARISON:None Department policy stipulates all CT scans at this facility are performed using 
dose reduction optimization techniques as appropriate to the performed exam, 
including the following: Automated exposure control, adjustments of the mA 
and/or KVP according to the patient size, and the use of iterative 
reconstruction technique. LIMITATIONS: Respiratory motion in the upper abdomen TECHNIQUE: Axial images with multiplanar reconstruction. No IV contrast. 
 
CHEST: No acute airspace process or pleural effusion seen at the lung bases. Heart remains enlarged with left ventricular assist device in place. LIVER: Normal 
GALLBLADDER: Normal 
BILIARY TREE: Normal 
PANCREAS: Normal 
SPLEEN: Normal 
ADRENAL GLANDS: Normal 
KIDNEYS/URETERS: Both kidneys show an element of cortical thinning. No 
hydronephrosis, stone disease or renal mass. Urinary bladder is unremarkable RETROPERITONEUM/AORTA: Atherosclerotic changes in the aorta and its branches. No 
aneurysm or periaortic pathology BOWEL/MESENTERY: Stomach and small bowel are unremarkable. The colon shows mild 
diverticulosis without evidence of diverticulitis. The rectum is somewhat 
distended with fecal material. There is no bowel wall thickening. There is no 
dilatation of the colon proximal to the rectum. APPENDIX: Identified and normal 
PERITONEAL CAVITY: No free air or fluid REPRODUCTIVE: Removed BONE/TISSUES: No acute process. On the right there is mild stranding in the 
subcutaneous fat along the lateral abdominal wall at the level of the lower 
margin of the rib cage down to the iliac region. No similar finding is seen on 
the left. OTHER: None Impression IMPRESSION: No specific acute abnormality. Distended stool-filled rectum. Impaction? No evidence of obstruction proximal to 
this point. Mild colonic diverticulosis. Minimal subcutaneous fat changes right lateral abdominal wall.  This may 
represent benign dependent changes if the patient lays on her right. A mild 
contusion could also give this appearance. Correlate with history and physical 
exam.  
 
 
 
  
Results from Hospital Encounter encounter on 10/27/20 CT CHEST WO CONT Narrative INDICATION: Infection and inflammatory reaction due to cardiac device COMPARISON: None CONTRAST: None. TECHNIQUE:  5 mm axial images were obtained through the chest. Coronal and 
sagittal reconstructions were generated. The absence of intravenous contrast 
reduces the sensitivity for evaluation of the mediastinum and upper abdominal 
organs. CT dose reduction was achieved through use of a standardized protocol 
tailored for this examination and automatic exposure control for dose 
modulation. FINDINGS: 
 
THYROID: No nodule. MEDIASTINUM: No mass or lymphadenopathy. SHASHI: No mass or lymphadenopathy. THORACIC AORTA: No aneurysm. MAIN PULMONARY ARTERY: Normal in caliber. TRACHEA/BRONCHI: Patent. ESOPHAGUS: No wall thickening or dilatation. HEART: The patient is status post median sternotomy. There is an ICD in place 
with one lead in the right atrium the other in the right ventricle and one 
posterior to left ventricle. There is a heart mate cardiac assist device present. In the midline just 
inferior to the sternum and xiphoid process in the subcutaneous adipose tissues 
is a 2.9 x 2.0 x 3.2 cm relatively high density area measuring  35 and54 Hounsfield units. This is not simple fluid this could represent infected fluid 
or granulation material from inflammatory process. This extends from the skin 
where there is possibly skin defect and skin thickening to the chest and 
abdominal wall muscles at about the level inferior to the xiphoid process. This 
extends down to the level of the cardiac assist device at the level of the 
metallic connection with the outflow cannula. This has well-defined margins 
there is no surrounding stranding. No gas or air is within this.  Whether this 
represents granulation tissue or an infected material is uncertain. It could be 
combination of both. Again it appears to extend to the skin and there may be a 
skin defect. Clinical correlation is suggested. Just anterior to the proximal aspect of the outflow cannula is triangular  
shaped soft tissue density anterior to the cannula. No surrounding stranding. No 
adjacent air. Whether this represents granulation tissue or inflammatory 
reaction is uncertain. The remainder of the outflow cannula to the level of the superior aspect of the 
aorta images normally. The polyp in the peritoneal cavity causes significant 
streak artifact no fluid collections are round the pelvic. No fluid collections 
are seen around the inflow catheter No pericardial fluid. The 2 halves of the sternum are well apposed and there 
appears to be bony fusion. PLEURA: No effusion or pneumothorax. LUNGS: No nodule, mass, or airspace disease. INCIDENTALLY IMAGED UPPER ABDOMEN: No focal abnormality. BONES: No destructive bone lesion. Impression IMPRESSION: 
 
1. In the midline inferior to the sternum and expected location of the manubrium 
and apparently socially with skin defect is a subcutaneous oval-shaped density 
measuring between 35 and 54 Hounsfield units. This has well-defined margins 
without adjacent stranding. This could represent high density infected fluid. This could represent granulation tissue this could represent a combination of 
both. This does extend down to the level of the left ventricular assist device 
outflow tract anteriorly. There is no associated gas collection. Slightly more 
superiorly and along the anterior aspect of the outflow tract cannula is a small 
triangular area of soft tissue density measuring 54 Hounsfield units that could 
represent granulation tissue or inflammatory reaction. This is not associated 
with fluid density or air density. . Please see above text ASSESSMENT:   
 Royal Courser is a pleasant 30-year-old right-hand-dominant female with a history of end-stage systolic cardiomyopathy status post LVAD placement with worsening tremors prompting neurology consultation RECOMMENDATIONS: 
Multifocal myoclonus, diffuse tremors: 
While what was witnessed previously is unknown to me though is described as a more intense version of what is witnessed during my time in the room. Multifocal metabolic myoclonus is what is noted in the room along with classic asterixis Will discontinue gabapentin as this was recently reinitiated and is renally cleared in a patient with progressive/slowly declining renal function when comparing trend over time and can precipitate myoclonus Otherwise multifocal myoclonus is occurring the setting of slowly declining renal function, borderline/low ammonia elevation and chronic CO2 retention There is no athetotic or choreiform movements noted, no dyskinetic-like movements described which would be more classically expected for EPS/excessive dopaminergic tone Otherwise short of a limb shaking TIA which would be assumed to be unilateral there are no, nontoxic/metabolic causes of acute tremor evident in this patient Patient has had similar episodes in the past under similar circumstances as described by daughter who is a reliable historian If myoclonus remains bothersome could consider low-dose benzodiazepines with patient having a listed allergy to them though unclear what this is would need clarification Otherwise no further recommendations Addy Hendrickson MD

## 2021-04-25 NOTE — PROGRESS NOTES
[facility] 
 [address] [facility], [facility] [address] [facility] Phone: [phone]  
  
Nephrology Progress Note 
Ki Hsu     1950     [id number] Date of Admission : 4/16/2021 04/25/21 CC: Follow up for ROCIO Assessment and Plan ROCIO on CKD 
- 2/2 CRS but now worse from diuresis (which she needed) - Cr stable , non oliguric 
- Continue on bumex present dose, good response. 
- she is at the verge of dialysis but there is no urgent need for RRT today. she is expected to do poorly with dialysis. -Spoke to daughter at bedside. 
- labs daily. Involuntary/jerky movements: 
-needs neurology eval. 
-excessive dopaminergic activity 
-It is not from ROCIO/CKD 
-Avoid Reglan. CKD stage III. - baseline around 2 
- presumed 2/2 DM, HTN. 
- Bone marrow Bx not done. - Renal US appearance of Kidneys no suggestive of Amyloidosis  
  
Chronic systolic CHF, stage C NYHA class IV 
-Combined ischemic and nonischemic cardiomyopathy 
-S/p HM 2 LVAD implant 4/15/2017 by Dr. Cathy Cabrera at [facility] 16 
- hx of recurrent VT : off mexiletine - chronic RV failure. 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 Chronic anemia :  
+ve PYP testing.  
- anemia of chronic disease  
- continue HEIKE Leucopenia, Thrombocytopenia   
Severe COPD. Pulmonary hypertension. Chronic A. fib. History of endometrial Ca ATTR amyloidosis Interval History: 
Seen and examined. Cr stable Lytes stable Almost 1 liter UOP Review of Systems: A comprehensive review of systems was negative except for that written in the HPI. Current Medications:  
Current Facility-Administered Medications Medication Dose Route Frequency  0.9% sodium chloride infusion 250 mL  250 mL IntraVENous PRN  
 bumetanide (BUMEX) injection 2 mg  2 mg IntraVENous TID  potassium chloride SR (KLOR-CON 10) tablet 20 mEq  20 mEq Oral DAILY  albuterol (PROVENTIL HFA, VENTOLIN HFA, PROAIR HFA) inhaler 2 Puff  2 Puff Inhalation Q4H PRN  
 OTHER(NON-FORMULARY) 1 Puff  1 Puff Inhalation BID  
 gabapentin (NEURONTIN) capsule 100 mg  100 mg Oral TID  epoetin amalia-epbx (RETACRIT) injection 20,000 Units  20,000 Units SubCUTAneous Q MON, WED & FRI  
 oxymetazoline (AFRIN) 0.05 % nasal spray 2 Spray  2 Spray Both Nostrils BID PRN  
 sodium chloride (OCEAN) 0.65 % nasal squeeze bottle 2 Spray  2 Spray Both Nostrils Q2H PRN  therapeutic multivitamin (THERAGRAN) tablet 1 Tab  1 Tab Oral DAILY  polyethylene glycol (MIRALAX) packet 17 g  17 g Oral DAILY  isosorbide mononitrate ER (IMDUR) tablet 30 mg  30 mg Oral DAILY  cephALEXin (KEFLEX) capsule 250 mg  250 mg Oral BID  sodium chloride (NS) flush 5-40 mL  5-40 mL IntraVENous Q8H  
 sodium chloride (NS) flush 5-40 mL  5-40 mL IntraVENous PRN  
 acetaminophen (TYLENOL) tablet 650 mg  650 mg Oral Q4H PRN  
 hydrALAZINE (APRESOLINE) 20 mg/mL injection 10 mg  10 mg IntraVENous Q4H PRN  
 hydrALAZINE (APRESOLINE) tablet 100 mg  100 mg Oral TID  hydrOXYzine HCL (ATARAX) tablet 10 mg  10 mg Oral TID PRN  
 insulin glargine (LANTUS) injection 20 Units  20 Units SubCUTAneous BID  PARoxetine (PAXIL) tablet 20 mg  20 mg Oral DAILY  ranolazine ER (RANEXA) tablet 1,000 mg  1,000 mg Oral BID  tafamidis cap 61 mg (Patient Supplied)  61 mg Oral DAILY  warfarin dosing per pharmacy   Other Rx Dosing/Monitoring  miconazole (MICOTIN) 2 % powder   Topical BID Allergies Allergen Reactions  Benzodiazepines Other (comments) Respiratory issues Objective: 
Vitals:  
Vitals:  
 04/25/21 0002 04/25/21 0130 04/25/21 0720 04/25/21 9774 BP:      
Pulse: 97  78 78 Resp: 28  26 20 Temp: 98.2 °F (36.8 °C)  98.4 °F (36.9 °C) 98.5 °F (36.9 °C) SpO2: 94% 97% 96% 97% Weight:      
Height:      
 
Intake and Output: 
No intake/output data recorded. 04/23 1901 - 04/25 0700 In: 840 [P.O.:840] Out: 975 [Urine:975] Physical Examination: 
General: Morbidly obese, in NAD Gold Anchors Neck: Supple,no mass palpable Lungs : CTA  
CVS: RRR, VAD sounds Abdomen: Soft, NT, BS +, obese Extremities: LE discoloration, LUE lymphedema Neurologic: AOOX 3  
 : sahu 
  
 
[]    High complexity decision making was performed 
[]    Patient is at high-risk of decompensation with multiple organ involvement Lab Data Personally Reviewed: I have reviewed all the pertinent labs, microbiology data and radiology studies during assessment. Recent Labs  
  04/25/21 0343 04/25/21 0342 04/24/21 
0432 04/23/21 
0447  138 137  137 138  138  
K 3.9 3.9 3.8  3.8 4.0  3.9  101 98  97 97  97 CO2 32 31 35*  35* 32  33* GLU 54* 54* 87  91 81  81 BUN 66* 66* 63*  60* 56*  57* CREA 4.04* 4.02* 3.79*  3.76* 3.68*  3.63* CA 8.2* 8.6 9.0  9.0 9.0  9.3 MG 2.6*  --  2.5* 2.5* PHOS 3.7  --  4.1 4.7 ALB 3.6 3.5 4.0  3.8 4.0  4.0 ALT  --  8* 8* 8* INR 2.5*  --  2.7* 2.6* Recent Labs  
  04/25/21 0343 04/23/21 0447 WBC 3.5* 2.8* HGB 6.7* 7.4* HCT 23.5* 25.5*  
* 130* No results found for: SDES Lab Results Component Value Date/Time Culture result: NO GROWTH 2 DAYS 04/23/2021 06:51 PM  
 Culture result: NO GROWTH 5 DAYS 11/10/2020 06:42 PM  
 Culture result: No Growth (<1000 cfu/mL) 11/09/2020 12:05 AM  
 Culture result: SCANT Morganella morganii ssp morganii (A) 10/30/2020 11:30 AM  
 Culture result: SCANT Morganella morganii ssp morganii (A) 10/30/2020 11:30 AM  
 Culture result: RARE DIPHTHEROIDS (A) 10/30/2020 11:30 AM  
 
Recent Results (from the past 24 hour(s)) AMMONIA Collection Time: 04/24/21 12:40 PM  
Result Value Ref Range Ammonia 25 <32 UMOL/L  
GLUCOSE, POC Collection Time: 04/24/21  4:52 PM  
Result Value Ref Range Glucose (POC) 92 65 - 100 mg/dL Performed by Ba HAJI   
GLUCOSE, POC  Collection Time: 04/24/21  8:32 PM  
Result Value Ref Range Glucose (POC) 130 (H) 65 - 100 mg/dL Performed by Sunitha Ernandeziter   
METABOLIC PANEL, COMPREHENSIVE Collection Time: 04/25/21  3:42 AM  
Result Value Ref Range Sodium 138 136 - 145 mmol/L Potassium 3.9 3.5 - 5.1 mmol/L Chloride 101 97 - 108 mmol/L  
 CO2 31 21 - 32 mmol/L Anion gap 6 5 - 15 mmol/L Glucose 54 (L) 65 - 100 mg/dL BUN 66 (H) 6 - 20 MG/DL Creatinine 4.02 (H) 0.55 - 1.02 MG/DL  
 BUN/Creatinine ratio 16 12 - 20 GFR est AA 13 (L) >60 ml/min/1.73m2 GFR est non-AA 11 (L) >60 ml/min/1.73m2 Calcium 8.6 8.5 - 10.1 MG/DL Bilirubin, total 0.6 0.2 - 1.0 MG/DL  
 ALT (SGPT) 8 (L) 12 - 78 U/L  
 AST (SGOT) 17 15 - 37 U/L Alk. phosphatase 43 (L) 45 - 117 U/L Protein, total 7.3 6.4 - 8.2 g/dL Albumin 3.5 3.5 - 5.0 g/dL Globulin 3.8 2.0 - 4.0 g/dL A-G Ratio 0.9 (L) 1.1 - 2.2 PROTHROMBIN TIME + INR Collection Time: 04/25/21  3:43 AM  
Result Value Ref Range INR 2.5 (H) 0.9 - 1.1 Prothrombin time 24.9 (H) 9.0 - 11.1 sec CBC WITH AUTOMATED DIFF Collection Time: 04/25/21  3:43 AM  
Result Value Ref Range WBC 3.5 (L) 3.6 - 11.0 K/uL  
 RBC 2.63 (L) 3.80 - 5.20 M/uL HGB 6.7 (L) 11.5 - 16.0 g/dL HCT 23.5 (L) 35.0 - 47.0 % MCV 89.4 80.0 - 99.0 FL  
 MCH 25.5 (L) 26.0 - 34.0 PG  
 MCHC 28.5 (L) 30.0 - 36.5 g/dL  
 RDW 18.1 (H) 11.5 - 14.5 % PLATELET 323 (L) 346 - 400 K/uL MPV 9.2 8.9 - 12.9 FL  
 NRBC 0.0 0  WBC ABSOLUTE NRBC 0.00 0.00 - 0.01 K/uL NEUTROPHILS 74 32 - 75 % LYMPHOCYTES 16 12 - 49 % MONOCYTES 10 5 - 13 % EOSINOPHILS 0 0 - 7 % BASOPHILS 0 0 - 1 % IMMATURE GRANULOCYTES 0 0.0 - 0.5 % ABS. NEUTROPHILS 2.5 1.8 - 8.0 K/UL  
 ABS. LYMPHOCYTES 0.6 (L) 0.8 - 3.5 K/UL  
 ABS. MONOCYTES 0.4 0.0 - 1.0 K/UL  
 ABS. EOSINOPHILS 0.0 0.0 - 0.4 K/UL  
 ABS. BASOPHILS 0.0 0.0 - 0.1 K/UL  
 ABS. IMM.  GRANS. 0.0 0.00 - 0.04 K/UL  
 DF SMEAR SCANNED    
 RBC COMMENTS ANISOCYTOSIS 
1+ 
    
 RBC COMMENTS OVALOCYTES PRESENT 
    
 RBC COMMENTS HYPOCHROMIA 1+ RENAL FUNCTION PANEL Collection Time: 04/25/21  3:43 AM  
Result Value Ref Range Sodium 138 136 - 145 mmol/L Potassium 3.9 3.5 - 5.1 mmol/L Chloride 102 97 - 108 mmol/L  
 CO2 32 21 - 32 mmol/L Anion gap 4 (L) 5 - 15 mmol/L Glucose 54 (L) 65 - 100 mg/dL BUN 66 (H) 6 - 20 MG/DL Creatinine 4.04 (H) 0.55 - 1.02 MG/DL  
 BUN/Creatinine ratio 16 12 - 20 GFR est AA 13 (L) >60 ml/min/1.73m2 GFR est non-AA 11 (L) >60 ml/min/1.73m2 Calcium 8.2 (L) 8.5 - 10.1 MG/DL Phosphorus 3.7 2.6 - 4.7 MG/DL Albumin 3.6 3.5 - 5.0 g/dL AMMONIA Collection Time: 04/25/21  3:43 AM  
Result Value Ref Range Ammonia 38 (H) <32 UMOL/L  
CORTISOL Collection Time: 04/25/21  3:43 AM  
Result Value Ref Range Cortisol, random 12.3 ug/dL MAGNESIUM Collection Time: 04/25/21  3:43 AM  
Result Value Ref Range Magnesium 2.6 (H) 1.6 - 2.4 mg/dL NT-PRO BNP Collection Time: 04/25/21  3:43 AM  
Result Value Ref Range NT pro-BNP 11,193 (H) <125 PG/ML  
RBC, ALLOCATE Collection Time: 04/25/21  5:00 AM  
Result Value Ref Range HISTORY CHECKED? Historical check performed TYPE & SCREEN Collection Time: 04/25/21  5:15 AM  
Result Value Ref Range Crossmatch Expiration 04/28/2021,2359 ABO/Rh(D) O NEGATIVE Antibody screen POS Comment Previously identified anti Jkb Antibody ID NO ADDITIONAL ANTIBODIES DETECTED   
 ELSIE Poly NEG Unit number [id number] Blood component type RC LR Unit division 00 Status of unit ISSUED ANTIGEN/ANTIBODY INFO Jk(B) NEGATIVE, Crossmatch result Compatible I have reviewed the flowsheets. Chart and Pertinent Notes have been reviewed. No change in PMH ,family and social history from Consult note.  
 
 
Artem Anderson MD

## 2021-04-25 NOTE — PROGRESS NOTES
0730: Bedside shift change report given to Mo Ventura (oncoming nurse) by Sangita Briseno (offgoing nurse). Report included the following information SBAR and Kardex. Problem: Falls - Risk of 
Goal: *Absence of Falls Description: Document Errol Vasquez Fall Risk and appropriate interventions in the flowsheet. Outcome: Progressing Towards Goal 
Note: Fall Risk Interventions: 
Mobility Interventions: Bed/chair exit alarm Medication Interventions: Evaluate medications/consider consulting pharmacy Elimination Interventions: Call light in reach, Bed/chair exit alarm History of Falls Interventions: Door open when patient unattended, Bed/chair exit alarm Problem: Pressure Injury - Risk of 
Goal: *Prevention of pressure injury Description: Document Jaime Scale and appropriate interventions in the flowsheet. Outcome: Progressing Towards Goal 
Note: Pressure Injury Interventions: 
Sensory Interventions: Assess need for specialty bed, Float heels Moisture Interventions: Check for incontinence Q2 hours and as needed, Internal/External urinary devices Activity Interventions: Increase time out of bed, Trapeze to reposition Mobility Interventions: HOB 30 degrees or less, Turn and reposition approx. every two hours(pillow and wedges), Trapeze to reposition Nutrition Interventions: Document food/fluid/supplement intake Friction and Shear Interventions: Lift sheet Problem: Patient Education: Go to Patient Education Activity Goal: Patient/Family Education Outcome: Progressing Towards Goal

## 2021-04-25 NOTE — PROGRESS NOTES
[address] [facility] [address] [place] in [facility] Inpatient Consult Progress Note Patient name: Lang Andrews Patient : 1950 Patient MRN: [id number] Consulting MD: Abdirizak Velez MD 
Date of service: 21 CHIEF COMPLAINT: 
Dyspnea 
  
PLAN: 
· RHC  showed adequate Sal CI 3.0 but severely elevated RA pressure 24 mmHg · Nephrology consult for worsening renal function; d/w Dr. Robi Monsivais - will continue IV Bumex today, keep sahu for strict I/O; no urgent need for RRT at this time · ID consult for pancytopenia and persistent sed rate appreciated; ?alternative abx agent - recommend against stopping antibiotics altogether due to chronic SWI/suspected pump infection and high risk of clinical deterioration if infection is not suppressed · Neurology consult for severe myoclonic jerking, ?tardive dyskinesia · Palliative Care Consult to discuss goals and limits of care including HD - patient is a poor candidate for HD due to:  
· End stage HF with LVAD and RV failure · Chronic, active infection which would likely seed HD line · Difficult disposition if patient were to require outpatient HD (woud likely have to travel to [facility] several times per week to accomplish this) · Discussed with daughter (mPOA), Linda Huerta; she and the patient understand concerns regarding patient's multiple co-morbidities, each with life limiting prognosis; they wish to pursue aggressive treatment including HD (if indicated) to prolong patient's life at home; goal is to spend time with her grandson, even if this means frequent trips to HD center. Reviewed today's plan of care including close UOP and lab monitoring, IV diuretics; will reassess in AM 
 
Continue increased device speed to 9600rpm due to LVEDD up to 8.3cm TTE  shows improved LVIDd, 6.68 cm with RVIDd 5.14 cm and TAPSE 1.1 cm Repeat TTE in AM  
Previously was intolerant to higher speeds due to VT; observe closely Continue Bumex 2 mg IV TID Fletcher for strict I/O Intolerant to BB/ACEi/ARB/ARNI due to aTTR Continue tafamidis 61 mg PO daily Hydralazine 100 mg po TID, MAP above goal 70-90 mmHg in absence of palpable radial pulse   
Continue Imdur 30 mg PO daily Intolerant of spironolactone due to hyperkalemia  
Coumadin dose per pharmacy; goal INR lowered to 1.8-2.5 due to GIB Continue ranolazine, no ectopy - followed by Dr. Elisa Zamora OP Antibiotics for chronic sternal wound infection per ID - continue Keflex po Check FOB due to anemia  
1 unit PRBCs today; transfuse for Hgb < 7 
TSH 3.99; Synthroid on hold due to tremors Use home Trilogy when napping and QHS PFTs when euvolemic  
SLP consult due to dysphagia appreciated Continue Miralax daily while aggressively diuresing WOCN consult for BLE wounds PT/OT eval 
Palliative care consult appreciated; patient remains full code DM management per Hospitalist  
Up to date on flu, PNA, and COVID vaccinations CM to assist with dispo planning; patient has [facility] and home care aide already set up  
 
IMPRESSION: 
R leg cellulitis BLE edema Acute on chronic RV failure Coronary artery disease · [facility] (8/2016) high grade ramus and small PDA disease, borderline disease of LAD and takeoff of pRCA Chronic systolic heart failure · Stage C, NYHA class IV improved to IIIA symptoms with LVAD · Combined ischemic and non-ischemic cardiomyopathy, LVEF 15% · Mitral regurtigation, moderate to severe, resolved C/b cardiogenic shock s/p Impella bridge to LVAD S/p HeartMate 2 LVAD implantation (4/5/17 by Dr. Diego Paz) · C/b delayed extubation due to severe COPD 
· C/b critical illness polyneuropathy · C/b prolonged hospitalization post-LVAD, 55 days · C/b sternal wound infection s/p debridement (by Dr. Lupe Stallings) s/p wound vac · C/b sacral ulcer · Would culture positive for Staph aureus, not MRSA · C/b LVAD site drainage, improved S/p CRT-ICD · ICD fired due to electrolyte imbalance (2011) H/o breast cancer (1992) · s/p bilateral mastectomy/chemo and endometrial cancer s/p hysterectomy · Lymphedema of LLE due to cancer treatment Severe COPD with FEV1 50% Depression Atrial fibrillation H/o \"two mini strokes\" S/p fall with hip facture · Right hip hemmiarthroplasty (5/23/18) by Dr. Diego Oliver) · S/p removed hip hardwarare due to pain (4/15/19) COPD severe CKD, stage 4 Hyperkalemia, resolved Pulmonary hypertension Cardiac risk factors: · Morbid obesity, Body mass index is 42.29 kg/m². · DM2 insulin dependent · JED on Trilogy · HL Urinary incontinence, severe · no procedures due to anticoagulation Endometrial cancer (2002) HTN 
HL 
  
CARDIAC IMAGING: 
Echo (9/24/19) LVEF 20-25%, AV opens 1:1, no AR Echo (5/29/18) LVEF 10-%, ramps study done, report in [facility] Echo (1/9/18) ramp study done, LVEDD 7.1cm C (11/9/18) 2 vessel disease with 90% OM, 80% PDA, DSA to PDA branch 
  INTERVAL HISTORY: 
1L UOP Cr 4.02 from 3.79 Hgb 6.7 proBNP 59845 from 80275 INR 2.7 Ammonia 38 LVAD INTERROGATION: 
Device interrogated in person Device function normal, normal flow, no events LVAD Pump Speed (RPM): 2172 Pump Flow (LPM): 5.6 MAP: 81 
PI (Pulsitility Index): 5 Power: 6.4  Test: Yes 
Back Up  at Bedside & Labeled: Yes Power Module Test: Yes Driveline Site Care: No 
Driveline Dressing: Clean, Dry, and Intact Outpatient: No 
MAP in Therapeutic Range (Outpatient): Yes Testing Alarms Reviewed: Yes 
Back up SC speed: 9600 Back up Low Speed Limit: 9200 Emergency Equipment with Patient?: No 
Emergency procedures reviewed?: No 
Drive line site inspected?: No 
Drive line intergrity inspected?: Yes Drive line dressing changed?: No 
 
PHYSICAL EXAM: 
Visit Vitals BP (!) 80/0 Pulse 77 Temp 98.1 °F (36.7 °C) Resp 22 Ht 5' 7\" (1.702 m) Wt 257 lb 4.8 oz (116.7 kg) SpO2 97% BMI 40.30 kg/m² General: Patient is well developed, well-nourished in no acute distress. Frequent, significant myoclonal jerking HEENT: Normocephalic and atraumatic. No scleral icterus. Pupils are equal, round and reactive to light and accomodation. No conjunctival injection. Oropharynx is clear. Neck: Supple. No evidence of thyroid enlargements or lymphadenopathy. JVD: Cannot be appreciated Lungs: Breath sounds are equal and clear bilaterally. No wheezes, rhonchi, or rales. Heart: Regular rate and rhythm with normal S1 and S2. No murmurs, gallops or rubs. Abdomen: Soft, no mass or tenderness. No organomegaly or hernia. Bowel sounds present. Genitourinary and rectal: deferred Extremities: BLE + venous stasis Neurologic: No focal sensory or motor deficits are noted. Grossly intact. Psychiatric: Awake, alert an doriented x 3. Appropriate mood and affect. Skin: Warm, dry and well perfused. No lesions, nodules or rashes are noted. REVIEW OF SYSTEMS: 
General: Denies fever, night sweats. Ear, nose and throat: Denies difficulty hearing, sinus problems, runny nose, post-nasal drip, ringing in ears, mouth sores, loose teeth, ear pain, nosebleeds, sore throate, facial pain or numbess Cardiovascular: see above in the interval history Respiratory: Denies cough, wheezing, sputum production, hemoptysis. Gastrointestinal: Denies heartburn, constipation, intolerance to certain foods, diarrhea, abdominal pain, nausea, vomiting, difficulty swallowing, blood in stool Kidney and bladder: Denies painful urination, frequent urination, urgency, prostate problems and impotence Musculoskeletal: Denies joint pain, muscle weakness Skin and hair: Denies change in existing skin lesions, hair loss or increase, breast changes PAST MEDICAL HISTORY: 
Past Medical History:  
Diagnosis Date  Asthma  Cancer ([facility] [address] breast  
 Cancer ([facility] [address]   
 endometrial  
 Congestive heart failure, unspecified  CRI (chronic renal insufficiency)  Depression  [facility]  Hypertension PAST SURGICAL HISTORY: 
Past Surgical History:  
Procedure Laterality Date  HX HERNIA REPAIR    
 HX HYSTERECTOMY  HX MASTECTOMY FAMILY HISTORY: 
No family history on file. SOCIAL HISTORY: 
Social History Socioeconomic History  Marital status:  Spouse name: Not on file  Number of children: Not on file  Years of education: Not on file  Highest education level: Not on file Tobacco Use  Smoking status: Never Smoker  Smokeless tobacco: Never Used Substance and Sexual Activity  Alcohol use: Not Currently LABORATORY RESULTS: 
  
Labs Latest Ref Rng & Units 4/25/2021 4/25/2021 4/24/2021 4/24/2021 4/23/2021 4/23/2021 4/22/2021 WBC 3.6 - 11.0 K/uL 3.5(L) - - - - 2. 8(L) 3. 2(L)  
RBC 3.80 - 5.20 M/uL 2.63(L) - - - - 2.85(L) 2.97(L) Hemoglobin 11.5 - 16.0 g/dL 6. 7(L) - - - - 7. 4(L) 7. 7(L) Hematocrit 35.0 - 47.0 % 23. 5(L) - - - - 25. 5(L) 26. 4(L) MCV 80.0 - 99.0 FL 89.4 - - - - 89.5 88.9 Platelets 115 - 669 K/uL 127(L) - - - - 130(L) 128(L) Lymphocytes 12 - 49 % 16 - - - - 15 - Monocytes 5 - 13 % 10 - - - - 9 - Eosinophils 0 - 7 % 0 - - - - 0 - Basophils 0 - 1 % 0 - - - - 0 - Albumin 3.5 - 5.0 g/dL 3.6 3.5 4.0 3.8 4.0 4.0 - Calcium 8.5 - 10.1 MG/DL 8. 2(L) 8.6 9.0 9.0 9.0 9.3 - Glucose 65 - 100 mg/dL 54(L) 54(L) 87 91 81 81 -  
BUN 6 - 20 MG/DL 66(H) 66(H) 63(H) 60(H) 56(H) 57(H) -  
Creatinine 0.55 - 1.02 MG/DL 4.04(H) 4.02(H) 3.79(H) 3.76(H) 3.68(H) 3.63(H) - Sodium 136 - 145 mmol/L 138 138 137 137 138 138 - Potassium 3.5 - 5.1 mmol/L 3.9 3.9 3.8 3.8 4.0 3.9 -  
TSH 0.36 - 3.74 uIU/mL - - - 5.36(H) - - -  
LDH 81 - 246 U/L - - - 164 - 177 - Some recent data might be hidden Lab Results Component Value Date/Time TSH 5.36 (H) 04/24/2021 04:32 AM  
 TSH 3.99 (H) 04/17/2021 04:54 AM  
 TSH 2.980 10/01/2020 12:00 AM  
 
 
ALLERGY: 
Allergies Allergen Reactions  Benzodiazepines Other (comments) Respiratory issues CURRENT MEDICATIONS: 
 
Current Facility-Administered Medications:  
  0.9% sodium chloride infusion 250 mL, 250 mL, IntraVENous, PRN, Kraig Austin NP 
  warfarin (COUMADIN) tablet 2.5 mg, 2.5 mg, Oral, ONCE, Luis Brizuela MD 
  metanide [facility]) injection 2 mg, 2 mg, IntraVENous, TID, PollKarina torrese Oraa, NP, 2 mg at 04/25/21 1015   potassium chloride SR (KLOR-CON 10) tablet 20 mEq, 20 mEq, Oral, DAILY, Polliard, Deboraha Ebbing T, NP, 20 mEq at 04/25/21 1015   albuterol (PROVENTIL HFA, VENTOLIN HFA, PROAIR HFA) inhaler 2 Puff, 2 Puff, Inhalation, Q4H PRN, Luis Brizuela MD 
  OTHER(NON-FORMULARY) 1 Puff, 1 Puff, Inhalation, BID, Luis Brizuela MD, 1 Puff at 04/25/21 0900 
  gabapentin (NEURONTIN) capsule 100 mg, 100 mg, Oral, TID, Luis Brizuela MD, 100 mg at 04/25/21 1015   epoetin amalia-epbx (RETACRIT) injection 20,000 Units, 20,000 Units, SubCUTAneous, Q MON, WED & Silvia Yi MD, 20,000 Units at 04/23/21 2257   oxymetazoline (AFRIN) 0.05 % nasal spray 2 Spray, 2 Spray, Both Nostrils, BID PRN, Holly Walter, NP, 2 [place] at 04/22/21 1800 
  sodium chloride (OCEAN) 0.65 % nasal squeeze bottle 2 Spray, 2 Spray, Both Nostrils, Q2H PRN, PolliardKarinae Galla, NP 
  therapeutic multivitamin (THERAGRAN) tablet 1 Tab, 1 Tab, Oral, DAILY, PolliardKarinae Galla, NP, 1 Tab at 04/25/21 1015   polyethylene glycol (MIRALAX) packet 17 g, 17 g, Oral, DAILY, Polliard, Deboraha Ebbing T, NP, 17 g at 04/24/21 1005   isosorbide mononitrate ER (IMDUR) tablet 30 mg, 30 mg, Oral, DAILY, Polliard, Deborakeron Ebbing T, NP, 30 mg at 04/25/21 1015   cephALEXin (KEFLEX) capsule 250 mg, 250 mg, Oral, BID, Yarelis Cornelius MD, 250 mg at 04/25/21 1016   sodium chloride (NS) flush 5-40 mL, 5-40 mL, IntraVENous, Q8H, Dana Walter T, NP, 10 mL at 04/24/21 1239 
  sodium chloride (NS) flush 5-40 mL, 5-40 mL, IntraVENous, PRN, Polliargeoff, Karinaorakeron Ebbing T, NP, 10 mL at 04/22/21 1238  acetaminophen (TYLENOL) tablet 650 mg, 650 mg, Oral, Q4H PRN, Jocelynn Walter NP, 650 mg at 04/18/21 9649   hydrALAZINE (APRESOLINE) 20 mg/mL injection 10 mg, 10 mg, IntraVENous, Q4H PRN, Ezekiel Walter NP, 10 mg at 04/21/21 6524   hydrALAZINE (APRESOLINE) tablet 100 mg, 100 mg, Oral, TID, Ezekiel Walter NP, 100 mg at 04/25/21 1015   hydrOXYzine HCL (ATARAX) tablet 10 mg, 10 mg, Oral, TID PRN, Jocelynn Walter NP 
  insulin glargine (LANTUS) injection 20 Units, 20 Units, SubCUTAneous, BID, Jocelynn Walter NP, 20 Units at 04/25/21 1023   PARoxetine (PAXIL) tablet 20 mg, 20 mg, Oral, DAILY, Ezekiel Walter NP, 20 mg at 04/25/21 1015 
  ranolazine ER (RANEXA) tablet 1,000 mg, 1,000 mg, Oral, BID, Ezekiel Walter NP, 1,000 mg at 04/25/21 1015   tafamidis cap 61 mg (Patient Supplied), 61 mg, Oral, DAILY, Ezekiel Walter NP, 61 mg at 04/25/21 1027   warfarin dosing per pharmacy, , Other, Rx Dosing/Monitoring, Jocelynn Walter NP 
  miconazole (MICOTIN) 2 % powder, , Topical, BID, Luci Allen MD, Given at 04/25/21 0900 PATIENT CARE TEAM: 
Patient Care Team: 
Schuyler Rice NP as PCP - General (Nurse Practitioner) Eunice Gaxiola MD (Cardiology) Pravin Cifuentes MD as Physician (Cardiology) Thank you for allowing me to participate in this patient's care. TEA Cervantes [address], [address] Phone: [phone] Fax: [phone]

## 2021-04-25 NOTE — PROGRESS NOTES
Pharmacist Note  Warfarin Dosing Consult provided for this 78 y. o.female to manage warfarin for LVAD INR Goal: 1.8-2.5 per NP (lowered due to GIB) Home regimen/ tablet size: 4 mg daily per most recent prescription Drugs that may increase INR: None Drugs that may decrease INR: None Other current anticoagulants/ drugs that may increase bleeding risk: None Risk factors: Age > 72 Daily INR ordered: YES Recent Labs  
  04/25/21 
0343 04/24/21 
0432 04/23/21 
0447 HGB 6.7*  --  7.4* INR 2.5* 2.7* 2.6* Date               INR                  Dose 4/16  1.6  4 mg 4/17  1.8  4 mg 4/18  1.8  4 mg 4/19  1.8  4 mg  
4/20                 2.0                  4 mg 
4/21                 2.1                  4 mg 
4/22                 2.2                  4 mg 
4/23                 2.6                  2.5 mg 
4/24  2.7  2.5 mg 
4/25  2.5  2.5 mg 
                                                                            
Assessment/ Plan: INR within goal range. Will order warfarin 2.5 mg PO x 1 dose today. Pharmacy will continue to monitor daily and adjust therapy as indicated. Please contact the pharmacist at  for outpatient recommendations if needed.

## 2021-04-26 NOTE — PROGRESS NOTES
Problem: Self Care Deficits Care Plan (Adult) Goal: *Acute Goals and Plan of Care (Insert Text) Description: FUNCTIONAL STATUS PRIOR TO ADMISSION: Patient was modified independent using a rollator for functional mobility. Pt lives alone, was able to perform dressing with AE (reacher.) She does not wear socks and has slip on shoes with pre tied laces. Pt was sponge bathing at home due to B LE wounds. Pt independently managed her LVAD 
 
HOME SUPPORT PRIOR TO ADMISSION: The patient lived alone with daughter to provide assistance. Will be staying with daughter once discharged. Occupational Therapy Goals Initiated 4/19/2021; Goals reviewed 4/26/2021, goals downgraded due to myoclonic jerking/tremors impacting performance. 1.  Patient will perform upper body dressing and lower body dressing using AE PRN with modified independence within 7 day(s). Downgrade to only UB dressing with min A 2. Patient will perform bathing with modified independence within 7 day(s). Downgrade to min A.  
3.  Patient will perform standing ADLs at sink with modified independence within 7 day(s). Downgrade to EOB with CGA 4. Patient will perform toilet transfers in bathroom with least restrictive DME with modified independence within 7 day(s). Downgrade to [facility] t/f with min A 
5. Patient will perform all aspects of toileting with modified independence within 7 day(s). Downgrade to min A. 
6.  Patient will utilize energy conservation techniques during functional activities with verbal cues within 7 day(s). Continue. Outcome: Progressing Towards Goal 
  
OCCUPATIONAL THERAPY TREATMENT/WEEKLY RE-ASSESSMENT Patient: Geeta Lindsay (24 y.o. female) Date: 4/26/2021 Diagnosis: Dyspnea [R06.00] <principal problem not specified> Procedure(s) (LRB): 
RIGHT HEART CATH (N/A) 6 Days Post-Op Precautions: (LVAD) Chart, occupational therapy assessment, plan of care, and goals were reviewed. ASSESSMENT Patient continues with skilled OT services and is progressing towards goals. Patient continues to present with significant full body tremors/myoclonic jerking that is severely impacting pt's ability to participate in ADLs and mobility. Supportive dtr present and confirms this has worsened since last session. This session, she is received in bed, alert and oriented with occasional confusion, agreeable to participate. Pt is able to hold onto ADL item initially, but with impaired coordination to complete task, noted to drop items or spill due to tremors. During UE AROM, pt with poor control and extremities will fall and hit herself or the bedrail, requiring active assist to prevent injury. She offers good efforts and is able to assist with repositioning in bed by pulling her shoulders, but sitting EOB deferred due to poor coordination/control and high risk for falling. Pt becomes perseverative during activity, requiring increased assistance to terminate tasks and redirect to new task. Due to confusion, difficult to assess sensation and pt with 50% accuracy during blind sensation testing. Goals downgraded to reflect this change and pt will continue to benefit from skilled OT services to maximize highest level of function. Current Level of Function Impacting Discharge (ADLs): up to max/total A for ADLs; A x 2 for mobility Other factors to consider for discharge: high fall risk; supportive dtr; pt was living alone (dtr reports pt will be coming to move in with her at d/c); full body jerking/tremors PLAN : 
Goals have been updated based on progression since last assessment. Patient continues to benefit from skilled intervention to address the above impairments. Continue to follow patient 3 times a week to address goals. Recommend with staff: bed in chair position 3x/day; Assistance for all ADLs; allow pt to try first and participate as able Recommend next OT session: EOB as able; ADLs as able; UE coordination Recommendation for discharge: (in order for the patient to meet his/her long term goals) Therapy up to 5 days/week in SNF setting This discharge recommendation: A follow-up discussion with the attending provider and/or case management is planned IF patient discharges home will need the following DME: TBD SUBJECTIVE:  
Patient stated I want to get well.  OBJECTIVE DATA SUMMARY:  
Cognitive/Behavioral Status: 
Neurologic State: Alert Orientation Level: Oriented X4(intermittent confusion) Cognition: Decreased attention/concentration; Follows commands Perseveration: Perseverates during ADLS;Perseverates during mobility Safety/Judgement: Awareness of environment Functional Mobility and Transfers for ADLs: 
Bed Mobility: 
Scooting: Maximum assistance;Assist x2(to scoot to [address]) Balance: 
Sitting: Impaired; With support ADL Intervention: 
Feeding Food to Mouth: Moderate assistance;Maximum assistance(difficulty  maintaining hold of spoon due to tremors) Drink to Mouth: Minimum assistance(due to tremors; drops cup) Cues: Physical assistance Cognitive Retraining Safety/Judgement: Awareness of environment Functional Outcome Measure: 
Barthel Index: 
 
Bathin Bladder: 5 Bowels: 10 
Groomin Dressin Feedin Mobility: 0 Stairs: 0 Toilet Use: 0 Transfer (Bed to Chair and Back): 5 Total: 25/100 The Barthel ADL Index: Guidelines 1. The index should be used as a record of what a patient does, not as a record of what a patient could do. 2. The main aim is to establish degree of independence from any help, physical or verbal, however minor and for whatever reason. 3. The need for supervision renders the patient not independent. 4. A patient's performance should be established using the best available evidence. Asking the patient, friends/relatives and nurses are the usual sources, but direct observation and common sense are also important. However direct testing is not needed.  
5. Usually the patient's performance over the preceding 24-48 hours is important, but occasionally longer periods will be relevant. 6. Middle categories imply that the patient supplies over 50 per cent of the effort. 7. Use of aids to be independent is allowed. Areatha Negus., Barthel, D.W. (7675). Functional evaluation: the Barthel Index. [address] [facility] (14)2. FORREST Helms, Jennifer Edward, Edy Roe, Vipin [facility], [address] [facility] (1999). Measuring the change indisability after inpatient rehabilitation; comparison of the responsiveness of the Barthel Index and Functional [place] Measure. Journal of Neurology, Neurosurgery, and Psychiatry, 66(4), 624-976. Dameon Soriano, N.J.A, ROSLYN Still, & Lele Horn MKERWIN. (2004.) Assessment of post-stroke quality of life in cost-effectiveness studies: The usefulness of the Barthel Index and the EuroQoL-5D. [facility], 13, [phone] Pain: 
Pt reporting minimal pain Activity Tolerance:  
Fair and Poor After treatment patient left in no apparent distress:  
Supine in bed, Heels elevated for pressure relief, Call bell within reach, Bed / chair alarm activated, Caregiver / family present and Side rails x 3 
 
COMMUNICATION/COLLABORATION:  
The patients plan of care was discussed with: Physical therapist and Registered nurse. Leonel Mason OT Time Calculation: 23 mins

## 2021-04-26 NOTE — PROGRESS NOTES
Pharmacist Note  Warfarin Dosing Consult provided for this 78 y. o.female to manage warfarin for LVAD INR Goal: 1.8-2.5 per NP (lowered due to GIB) Home regimen/ tablet size: 4 mg daily per most recent prescription Drugs that may increase INR: None Drugs that may decrease INR: None Other current anticoagulants/ drugs that may increase bleeding risk: None Risk factors: Age > 72 Daily INR ordered: YES Recent Labs  
  04/26/21 
0245 04/25/21 
1752 04/25/21 
0343 04/24/21 
3545 HGB 8.1* 8.2* 6.7*  --   
INR 1.8*  --  2.5* 2.7* Date               INR                  Dose 4/16  1.6  4 mg 4/17  1.8  4 mg 4/18  1.8  4 mg 4/19  1.8  4 mg  
4/20                 2.0                  4 mg 
4/21                 2.1                  4 mg 
4/22                 2.2                  4 mg 
4/23                 2.6                  2.5 mg 
4/24  2.7  2.5 mg 
4/25  2.5  2.5 mg 
 4/26               1.8                   4 mg Assessment/ Plan: INR 1.8. Will order warfarin 4 mg PO x 1 dose today. Pharmacy will continue to monitor daily and adjust therapy as indicated. Please contact the pharmacist at  for outpatient recommendations if needed.

## 2021-04-26 NOTE — PROGRESS NOTES
[facility] VAD/Heart Failure Progress Note Admit Date: 2021 POD:  6 Days Post-Op Procedure:  Procedure(s): RIGHT HEART CATH Subjective: LE edema improve; issues with tremors; deciding on dialysis Objective:  
Vitals: 
Blood pressure (!) 80/0, pulse 88, temperature 98.3 °F (36.8 °C), resp. rate 20, height 5' 7\" (1.702 m), weight 260 lb (117.9 kg), SpO2 98 %. Temp (24hrs), Av.5 °F (36.9 °C), Min:98.1 °F (36.7 °C), Max:99.1 °F (37.3 °C) Hemodynamics: 
 CO:   
 CI:   
 CVP:   
 SVR:   
 PAP Systolic:   
 PAP Diastolic:   
 PVR:   
 WU51:   
 SCV02:   
 
VAD Interrogation: LVAD (Heartmate) Pump Speed (RPM): 2189 Pump Flow (LPM): 6 Chatter in Lines: No 
PI (Pulsitility Index): 4.7 Power: 6.6 MAP: 82  Test: No 
Back Up  at Bedside & Labeled: Yes Power Module Test: No 
Driveline Site Care: No 
Driveline Dressing: Clean, Dry, and Intact EKG/Rhythm:   
 
Extubation Date / Time:  
 
CT Output:  
 
Ventilator: 
Ventilator Volumes Vt Spont (ml): 435 ml (21 025) Ve Observed (l/min): 6.7 l/min (21 025) Oxygen Therapy: 
Oxygen Therapy O2 Sat (%): 98 % (21 1200) Pulse via Oximetry: 85 beats per minute (21) O2 Device: Nasal cannula (21 0800) O2 Flow Rate (L/min): 2 l/min (21 0800) CXR: 
 
Admission Weight: Last Weight Weight: 270 lb 6.4 oz (122.7 kg) Weight: 260 lb (117.9 kg) Intake / Output / Drain: 
Current Shift:  0701 -  1900 In: 375 [P.O.:375] Out: 400 [Urine:400] Last 24 hrs.:  
 
Intake/Output Summary (Last 24 hours) at 2021 1248 Last data filed at 2021 0800 Gross per 24 hour Intake 615 ml Output 1100 ml Net -485 ml No results for input(s): CPK, CKMB, TROIQ in the last 72 hours. Recent Labs  
  21 
0249 21 
0245 21 
1752 21 
0343 21 
0342 21 
6022   --   --  138 138 137  137  
K 4.2  -- --  3.9 3.9 3.8  3.8 CO2 32  --   --  32 31 35*  35* BUN 75*  --   --  66* 66* 63*  60* CREA 4.29*  --   --  4.04* 4.02* 3.79*  3.76* *  --   --  54* 54* 87  91 PHOS  --  4.2  --  3.7  --  4.1 MG 2.6*  --   --  2.6*  --  2.5* WBC  --  4.9  --  3.5*  --   --   
HGB  --  8.1* 8.2* 6.7*  --   --   
HCT  --  27.8* 27.5* 23.5*  --   --   
PLT  --  130*  --  127*  --   --   
 
Recent Labs  
  04/26/21 
0245 04/25/21 
0343 04/24/21 
0432 INR 1.8* 2.5* 2.7* PTP 18.3* 24.9* 26.6* No lab exists for component: PBNP Current Facility-Administered Medications:  
  warfarin (COUMADIN) tablet 4 mg, 4 mg, Oral, ONCE, Keshav Grier MD 
  lidocaine (XYLOCAINE) 20 mg/mL (2 %) injection 400 mg, 20 mL, SubCUTAneous, RAD ONCE, Simone Alas MD 
  heparin (porcine) 1,000 unit/mL injection 10,000 Units, 10,000 Units, InterCATHeter, RAD ONCE, Simone Alas MD 
  0.9% sodium chloride infusion 250 mL, 250 mL, IntraVENous, PRN, Darrell Marcano NP 
  budesonide-formoteroL (SYMBICORT) 160-4.5 mcg/actuation HFA inhaler 1 Puff (Patient Supplied), 1 Puff, Inhalation, BID RT, Keshav Grier MD, 1 Puff at 04/26/21 1015   bumetanide (BUMEX) injection 2 mg, 2 mg, IntraVENous, TID, Donato Walter NP, 2 mg at 04/26/21 2526   potassium chloride SR (KLOR-CON 10) tablet 20 mEq, 20 mEq, Oral, DAILY, Donato Walter NP, 20 mEq at 04/26/21 0941 
  albuterol (PROVENTIL HFA, VENTOLIN HFA, PROAIR HFA) inhaler 2 Puff (Patient Supplied), 2 Puff, Inhalation, Q4H PRN, Keshav Grier MD 
  epoetin amalia-epbx (RETACRIT) injection 20,000 Units, 20,000 Units, SubCUTAneous, Q MON, WED & Edwardo Mensah MD, 20,000 Units at 04/23/21 2257   oxymetazoline (AFRIN) 0.05 % nasal spray 2 Spray, 2 Spray, Both Nostrils, BID PRN, Betito Walter NP, 2 Scroggins at 04/22/21 1800 
  sodium chloride (OCEAN) 0.65 % nasal squeeze bottle 2 Spray, 2 Spray, Both Nostrils, Q2H PRN, Betito Walter NP Adrianna Abler therapeutic multivitamin (THERAGRAN) tablet 1 Tab, 1 Tab, Oral, DAILY, Polliard, [place] Rued, NP, 1 Tab at 04/26/21 5954   polyethylene glycol (MIRALAX) packet 17 g, 17 g, Oral, DAILY, Polliard, Eva Fang T, NP, 17 g at 04/26/21 1009   isosorbide mononitrate ER (IMDUR) tablet 30 mg, 30 mg, Oral, DAILY, Polliard, Eva Fang T, NP, 30 mg at 04/26/21 3509   cephALEXin (KEFLEX) capsule 250 mg, 250 mg, Oral, BID, Rexanne Krabbe, MD, 250 mg at 04/26/21 0941 
  sodium chloride (NS) flush 5-40 mL, 5-40 mL, IntraVENous, Q8H, Polliard, Dana T, NP, 10 mL at 04/26/21 0629 
  sodium chloride (NS) flush 5-40 mL, 5-40 mL, IntraVENous, PRN, Polliard, Eva Fang T, NP, 10 mL at 04/22/21 1238   acetaminophen (TYLENOL) tablet 650 mg, 650 mg, Oral, Q4H PRN, Polliard, Allen Rued, NP, 650 mg at 04/18/21 5211   hydrALAZINE (APRESOLINE) 20 mg/mL injection 10 mg, 10 mg, IntraVENous, Q4H PRN, Polliard, Eva Fang T, NP, 10 mg at 04/21/21 8882   hydrALAZINE (APRESOLINE) tablet 100 mg, 100 mg, Oral, TID, Polliard, Eva Fang T, NP, 100 mg at 04/26/21 1279   hydrOXYzine HCL (ATARAX) tablet 10 mg, 10 mg, Oral, TID PRN, Pollbrian, [place] Rued, NP 
  insulin glargine (LANTUS) injection 20 Units, 20 Units, SubCUTAneous, BID, Polliard, Allen Rued, NP, 20 Units at 04/26/21 0609   PARoxetine (PAXIL) tablet 20 mg, 20 mg, Oral, DAILY, Polliard, Eva Fang T, NP, 20 mg at 04/26/21 0941 
  ranolazine ER (RANEXA) tablet 1,000 mg, 1,000 mg, Oral, BID, Allen Walter NP, 1,000 mg at 04/26/21 7424   tafamidis cap 61 mg (Patient Supplied), 61 mg, Oral, DAILY, Eva Walter NP, 61 mg at 04/26/21 0900   warfarin dosing per pharmacy, , Other, Rx Dosing/Monitoring, Allen Walter NP 
  miconazole (MICOTIN) 2 % powder, , Topical, BID, Rexanne Krabbe, MD, Given at 04/26/21 6395 A/P 
  
S/P LVAD - good flows Need for [facility] - coumadin Fluid overload - diuresis Acute kidney injury - monitor  
  
Risk of morbidity and mortality - high Medical decision making - high complexity Signed By: Lucius Bland MD

## 2021-04-26 NOTE — PROGRESS NOTES
Progress Note Patient: Yoni Navas MRN: [id number]  SSN: [id number] YOB: 1950  Age: 78 y.o. Sex: female Admit Date: 4/16/2021 LOS: 10 days Subjective:  
 
Andre wheat 78 y. o. female admitted on 10/27/2020  for non-ischemic cardiomyopathy,  acute on chronic systolic CHF (congestive heart failure) ([facility] [I50.23].   Has past medical history of chronic systolic CHF status post AICD, cardiogenic shock, s/p impella bridge to LVAD, post LVAD implantation (4/15/2017 by Dr. Easton Love failure, CAD (left heart catheterization 8/2016 with high-grade ramus and small PDA disease borderline disease of LAD and takeoff of pRCA), A. fib, pulmonary hypertension, JED on CPAP, diabetes type 2, HLD, severe COPD, CKD, morbid obesity, hypertension, breast cancer status post bilateral mastectomy/chemo, and endometrial cancer status post hysterectomy, lymphedema left lower extremity, severe COPD.    
  
She is admitted with acute on chronic systolic heart failure with worsening renal dysfunction. She is due for initiation of hemodialysis. She has developed myoclonic jerks of unclear etiology. Daughter relates these of been associated with anemia and renal dysfunction the past and resolved with transfusions. Currently she denies chest pain or shortness of breath. Jerking movements of peripheral extremities with weakness that precludes standing or getting to a chair.  Received packed red blood cells on 4/25/2021. Past Medical History:  
Diagnosis Date  Asthma  Cancer ([facility] breast  
 Cancer ([facility]   
 endometrial  
 Congestive heart failure, unspecified  CRI (chronic renal insufficiency)  Depression  Diabetes ([facility]  Hypertension Objective:  
 
Vitals:  
 04/25/21 2300 04/26/21 0311 04/26/21 0744 04/26/21 1200 BP:      
Pulse: 93 88 90 88 Resp: 17 22 20 20 Temp: 98.3 °F (36.8 °C) 99.1 °F (37.3 °C) 98.4 °F (36.9 °C) 98.3 °F (36.8 °C) SpO2: 92% 91% 92% 98% Weight:  117.9 kg (260 lb) Height:      
  
Temp (24hrs), Av.5 °F (36.9 °C), Min:98.1 °F (36.7 °C), Max:99.1 °F (37.3 °C) Intake and Output: 
Current Shift: 701 - 1900 In: 375 [P.O.:375] Out: 400 [Urine:400] Last three shifts: 1901 -  0700 In: 822.5 [P.O.:480] Out: 2200 [Urine:2200] Physical Exam: Obese General:  Alert, cooperative, well nourished, well developed, appears stated age Eyes:  Conjunctivae/corneas clear Nose: Nares normal. Septum midline. Mucosa normal. No drainage or sinus tenderness. Neck: Supple, symmetrical, trachea midline, no JVD Lungs:    Diminished breath sounds bilaterally Heart:  Regular rate and rhythm, LVAD Abdomen:   Soft, non-tender, bowel sounds normal, non-distended Extremities:  2+ pitting edema of the extremities Skin:  Stasis dermatitis Neurologic:  Myoclonus Current Facility-Administered Medications:  
  warfarin (COUMADIN) tablet 4 mg, 4 mg, Oral, ONCE, Chris Howard MD 
  lidocaine (XYLOCAINE) 20 mg/mL (2 %) injection 400 mg, 20 mL, SubCUTAneous, RAD ONCE, Padmini Ruiz MD 
  heparin (porcine) 1,000 unit/mL injection 10,000 Units, 10,000 Units, InterCATHeter, RAD ONCE, Padmini Ruiz MD 
  0.9% sodium chloride infusion 250 mL, 250 mL, IntraVENous, PRN, Edison Chicas NP 
  budesonide-formoteroL (SYMBICORT) 160-4.5 mcg/actuation HFA inhaler 1 Puff (Patient Supplied), 1 Puff, Inhalation, BID RT, Chris Howard MD, 1 Puff at 21 1015   bumetanide (BUMEX) injection 2 mg, 2 mg, IntraVENous, TID, Polliard, Evlyn Apt T, NP, 2 mg at 21 2613   potassium chloride SR (KLOR-CON 10) tablet 20 mEq, 20 mEq, Oral, DAILY, Polliard, Evlyn Apt T, NP, 20 mEq at 21 0941 
  albuterol (PROVENTIL HFA, VENTOLIN HFA, PROAIR HFA) inhaler 2 Puff (Patient Supplied), 2 Puff, Inhalation, Q4H PRN, Chris Howard MD 
  epoetin amalia-epbx (RETACRIT) injection 20,000 Units, 20,000 Units, SubCUTAneous, Q MON, WED & Joshua Carroll MD, 20,000 Units at 04/23/21 2257   oxymetazoline (AFRIN) 0.05 % nasal spray 2 Spray, 2 Spray, Both Nostrils, BID PRN, PollCharan torres, NP, 2 [place] at 04/22/21 1800 
  sodium chloride (OCEAN) 0.65 % nasal squeeze bottle 2 Spray, 2 Spray, Both Nostrils, Q2H PRN, PollCharan torres NP 
  therapeutic multivitamin (THERAGRAN) tablet 1 Tab, 1 Tab, Oral, DAILY, Charan Walter, NP, 1 Tab at 04/26/21 4451   polyethylene glycol (MIRALAX) packet 17 g, 17 g, Oral, DAILY, Polliard, Irvine Do T, NP, 17 g at 04/26/21 1009   isosorbide mononitrate ER (IMDUR) tablet 30 mg, 30 mg, Oral, DAILY, Polliard, Irvine Do T, NP, 30 mg at 04/26/21 7076   cephALEXin (KEFLEX) capsule 250 mg, 250 mg, Oral, BID, Dirk Cockayne, MD, 250 mg at 04/26/21 0941 
  sodium chloride (NS) flush 5-40 mL, 5-40 mL, IntraVENous, Q8H, Pollbrian Dana T, NP, 10 mL at 04/26/21 0629 
  sodium chloride (NS) flush 5-40 mL, 5-40 mL, IntraVENous, PRN, Polliard Irvine Do T, NP, 10 mL at 04/22/21 1238   acetaminophen (TYLENOL) tablet 650 mg, 650 mg, Oral, Q4H PRN, PollCharan torres, NP, 650 mg at 04/18/21 5129   hydrALAZINE (APRESOLINE) 20 mg/mL injection 10 mg, 10 mg, IntraVENous, Q4H PRN, Polliard, Irvine Do T, NP, 10 mg at 04/21/21 3854   hydrALAZINE (APRESOLINE) tablet 100 mg, 100 mg, Oral, TID, Polliard, Joel Do T, NP, 100 mg at 04/26/21 3415   hydrOXYzine HCL (ATARAX) tablet 10 mg, 10 mg, Oral, TID PRN, Charan Walter, NP 
  insulin glargine (LANTUS) injection 20 Units, 20 Units, SubCUTAneous, BID, Charan Walter, NP, 20 Units at 04/26/21 5341   PARoxetine (PAXIL) tablet 20 mg, 20 mg, Oral, DAILY, Polliard, Irvine Do T, NP, 20 mg at 04/26/21 0941 
  ranolazine ER (RANEXA) tablet 1,000 mg, 1,000 mg, Oral, BID, Charan Walter, NP, 1,000 mg at 04/26/21 8338   tafamidis cap 61 mg (Patient Supplied), 61 mg, Oral, DAILY, Polliard, Irvine Do T, NP, 61 mg at 04/26/21 0900   warfarin dosing per pharmacy, , Other, Rx Dosing/Monitoring, Betito Walter, NP 
  miconazole (MICOTIN) 2 % powder, , Topical, BID, Fabio Horton MD, Given at 04/26/21 8323 Lab/Data Review: 
Labs Latest Ref Rng & Units 4/26/2021 4/25/2021 4/25/2021 4/25/2021 4/24/2021 4/24/2021 4/23/2021 WBC 3.6 - 11.0 K/uL 4.9 - 3. 5(L) - - - -  
RBC 3.80 - 5.20 M/uL 3.13(L) - 2.63(L) - - - - Hemoglobin 11.5 - 16.0 g/dL 8. 1(L) 8.2(L) 6. 7(L) - - - - Hematocrit 35.0 - 47.0 % 27. 8(L) 27. 5(L) 23. 5(L) - - - - MCV 80.0 - 99.0 FL 88.8 - 89.4 - - - - Platelets 648 - 905 K/uL 130(L) - 127(L) - - - - Lymphocytes 12 - 49 % 12 - 16 - - - - Monocytes 5 - 13 % 8 - 10 - - - - Eosinophils 0 - 7 % 0 - 0 - - - - Basophils 0 - 1 % 0 - 0 - - - - Albumin 3.5 - 5.0 g/dL - - 3.6 3.5 4.0 3.8 4.0 Calcium 8.5 - 10.1 MG/DL 9.0 - 8. 2(L) 8.6 9.0 9.0 9.0 Glucose 65 - 100 mg/dL 108(H) - 54(L) 54(L) 87 91 81 BUN 6 - 20 MG/DL 75(H) - 66(H) 66(H) 63(H) 60(H) 56(H) Creatinine 0.55 - 1.02 MG/DL 4.29(H) - 4.04(H) 4.02(H) 3.79(H) 3.76(H) 3.68(H) Sodium 136 - 145 mmol/L 136 - 138 138 137 137 138 Potassium 3.5 - 5.1 mmol/L 4.2 - 3.9 3.9 3.8 3.8 4.0 TSH 0.36 - 3.74 uIU/mL - - - - - 5.36(H) -  
LDH 81 - 246 U/L 185 - - - - 164 - Some recent data might be hidden 04/16/21 ECHO ADULT FOLLOW-UP OR LIMITED 04/19/2021 4/19/2021 Narrative · LV: Estimated LVEF is 20 - 25%. Normal wall thickness. Dilated left  
ventricle. Severely reduced systolic function. LVIDd appears smaller  
compared to previous study. · LA: Dilated left atrium. · MV: Mitral valve non-specific thickening. Mild mitral valve  
regurgitation is present. · RV: Not well visualized. Reduced systolic function. · TV: Mild to moderate tricuspid valve regurgitation is present. · PA: Mild pulmonary hypertension. Signed by: Jg Galvez MD  
 
 
04/16/21 CARDIAC PROCEDURE 04/20/2021 4/20/2021 Narrative Date of Procedure: 4/20/2021 PCP: William Montilla R, NP Cardiologist: Denver Fast, MD 
AHF: Rajat Dorantes MD PhD 
  
Indications: This is a 78 y.o. female who presents with acute on chronic  
systolic heart failure. She has an LVAD at 9600 rpm..  
  
Procedure: 
Informed consent was obtained. Time out was performed. The patient was  
brought to the cardiac catheterization lab and the right arm prepped and  
draped in the usual sterile fashion. The skin was infiltrated with  
lidocaine, and the brachial vein accessed using the modified Seldinger  
technique with ultrasound guidance. A 6 Fr sheath was advanced without  
difficulty. A 5.5 Fr PA catheter was then advanced into the right heart. Hemodynamic measurements were obtained. Cardiac output measurement were  
obtained.  
  
At the termination of the case, the catheter and sheath were removed and  
manual pressure applied until hemostasis was achieved. The patient  
tolerated the procedure well and was transferred to recovery in stable  
condition.   
  
Blood Loss: Minimal. 
Complications: None 
  
Findings:  
RA = 23/28/24 RV = 51/14/24 PA = 55/27/38 PCWP = 28/32/23  
  
PA Sat = 53.9 % Ao Sat = 91 % Hgb = 8.2 mg/dl 
  
CO/CI = 6.9/3.0 (Thermo) CO/CI = 6.9/3.0 (Sal) 
  Signed by: Donnell Butcher MD  
 
 
 
 
 
Assessment:  
 
Active Problems: 
  Obesity, morbid ([address] (10/1/2020) Acute on chronic systolic CHF (congestive heart failure) ([address] (10/27/2020) NICM (nonischemic cardiomyopathy) ([address] (11/4/2020) Coronary artery disease involving native coronary artery of native heart without angina pectoris (11/4/2020) Chronic venous insufficiency (11/4/2020) Anemia (11/9/2020) Coagulopathy ([address] (11/10/2020) Open wound of left lower leg (11/10/2020) Dyspnea (4/16/2021) Plan: Ms. Leon Crawford is a 77-year-old female with nonischemic cardiomyopathy, LVAD in situ, coronary artery disease, atrial fibrillation, diabetes mellitus, obstructive sleep apnea admitted with acute on chronic systolic heart failure. Advanced heart failure continues to adjust medications and left ventricular assist device to optimize cardiac output and volume status. Unfortunately worsening renal function has made this difficult. The patient and her daughter would like to try hemodialysis to see if this can remove toxins and turn her around. The patient's daughter understands that her mother is critically ill; however, she would like to try hemodialysis in the short-term before considering hospice care. She feels the patient did improve with blood transfusion yesterday. She is having myoclonus of unclear etiology. Medications have been simplified. Consider metabolic. Continue to follow and assess after hemodialysis. Signed By: Petros Vazquez MD   
 April 26, 2021 Interventional Cardiology [facility] [address], [address] P: [phone] F: [phone]

## 2021-04-26 NOTE — PROGRESS NOTES
[address] [facility] Adult  Hospitalist Group Hospitalist Progress Note Dana Sousa MD 
Answering service: [phone] from in house phone NAME:  Ayanna De La O :  1950 MRN:  [id number] Admission Summary:  
Ayanna De La O is a 78 y.o. female with a past medical history of heart failure s/p LVAD, COPD, CKD stage 3, pulmonary HTN, Depression, HTN, and HLD who presented to [facility] appointment with complaints of chest tightness and increasing dyspnea. She was instructed to come to [facility] for admission for further treatment. At time of examination patient experiencing dyspnea but states she's feeling 100% better than what she felt yesterday. Interval history / Subjective:  
 
Patient seen and examined Heart failure team adjusting meds Creatinine elevated with minimal change Severe tremors- stopped nebs and synthroid- resume home inhalers- eval by neurology Assessment & Plan:  
 
 Acute on Chronic systolic congestive heart failure , s/p LVAD 
- NYHA class IV ;  s/p LVAD heartMate 2 , increased speed per HF team  
- Advanced heart failure team following and adjusting meds , anticoagulation with warfarin RH-  to measure heart pressures  
  
COPD-  Stopped nebs- resume home inhalers 
symbicort and albuterol Severe tremors- unclear etiology- stopped nebs and thyroid med Improved after blood transfusion, neurology eval completed- neurontin discontinued Subclinical hypothyroidism- normal free T4 with minimally elevated TSH Do not recommend any treatment or thyroid meds for these labs- DC synthroid Constipation with KUB suggesting fecal impaction- resolved after suppository CKD stage 3 Creatinine elevated due to diuresis Nephrology consulted and managing Adjusting bumex doses/regimen 
  
DM  
- A1c 7.2  
- Monitor BG AC/HS  
- Continue lantus 20 units  
  
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  
  
JED - Continue home trilogy Depression - cont Paroxetine H/o breast cancer (1992) · s/p bilateral mastectomy/chemo and endometrial cancer s/p hysterectomy Pancytopenia Etiology unknown at this point Hematology consulted for eval and tx Hb < 7 today; transfusion ordered Code status: full DVT prophylaxis: warfarin- therapeutic INR Care Plan discussed with: Patient/Family Anticipated Disposition: Home w/Family Anticipated Discharge: 24 hours to 48 hours Hospital Problems  Date Reviewed: 4/25/2021 Codes Class Noted POA Dyspnea ICD-10-CM: R06.00 
ICD-9-CM: 786.09  4/16/2021 Unknown Review of Systems: A comprehensive review of systems was negative except for that written in the HPI. Vital Signs:  
 Last 24hrs VS reviewed since prior progress note. Most recent are: 
Visit Vitals BP (!) 80/0 Pulse 91 Temp 99 °F (37.2 °C) Resp 26 Ht 5' 7\" (1.702 m) Wt 117 kg (258 lb) SpO2 92% BMI 40.41 kg/m² Patient Vitals for the past 24 hrs: 
 Temp Pulse Resp SpO2  
04/25/21 2051 99 °F (37.2 °C) 91 26 92 % 04/25/21 1529 98.1 °F (36.7 °C) 75 20 95 % 04/25/21 1200 98.1 °F (36.7 °C) 70 20 95 % 04/25/21 1014 98.1 °F (36.7 °C) 77    
04/25/21 0930 98.2 °F (36.8 °C) 72    
04/25/21 0830 98.5 °F (36.9 °C) 77 22 97 % 04/25/21 0735 98.5 °F (36.9 °C) 78 20 97 % 04/25/21 0720 98.4 °F (36.9 °C) 78 26 96 % 04/25/21 0130    97 % 04/25/21 0002 98.2 °F (36.8 °C) 97 28 94 % Intake/Output Summary (Last 24 hours) at 4/25/2021 2327 Last data filed at 4/25/2021 1529 Gross per 24 hour Intake 822.5 ml Output 1150 ml Net -327.5 ml Physical Examination:  
 
I had a face to face encounter with this patient and independently examined them on 04/25/21 as outlined below: 
     
Constitutional: NAD, awake and alert ENT: Oral mucosa moist, Resp: Decreased breath sounds, no wheezing CV:  audible LVAD motor GI:  Soft, non distended, non tender. normoactive bowel sounds, no hepatosplenomegaly Musculoskeletal:  mild-mod edema, warm, wounds on bilat LE Neurologic:  Moves all extremities. AAOx3, CN II-XII reviewed, diffuse tremors in upper and lower extremities. Data Review:  
 Review and/or order of clinical lab test 
 
 
Labs:  
 
Recent Labs  
  04/25/21 1752 04/25/21 0343 04/23/21 0447 WBC  --  3.5* 2.8* HGB 8.2* 6.7* 7.4* HCT 27.5* 23.5* 25.5* PLT  --  127* 130* Recent Labs  
  04/25/21 0343 04/25/21 0342 04/24/21 0432 04/23/21 0447  138 137  137 138  138  
K 3.9 3.9 3.8  3.8 4.0  3.9  101 98  97 97  97 CO2 32 31 35*  35* 32  33* BUN 66* 66* 63*  60* 56*  57* CREA 4.04* 4.02* 3.79*  3.76* 3.68*  3.63* GLU 54* 54* 87  91 81  81  
CA 8.2* 8.6 9.0  9.0 9.0  9.3 MG 2.6*  --  2.5* 2.5* PHOS 3.7  --  4.1 4.7 Recent Labs  
  04/25/21 0343 04/25/21 0342 04/24/21 0432 04/23/21 0447 ALT  --  8* 8* 8* AP  --  43* 52 50 TBILI  --  0.6 0.6 0.7 TP  --  7.3 8.0 7.9 ALB 3.6 3.5 4.0  3.8 4.0  4.0  
GLOB  --  3.8 4.0 3.9 Recent Labs  
  04/25/21 0343 04/24/21 0432 04/23/21 0447 INR 2.5* 2.7* 2.6* PTP 24.9* 26.6* 25.6* No results for input(s): FE, TIBC, PSAT, FERR in the last 72 hours. Lab Results Component Value Date/Time Folate 10.0 10/28/2020 03:56 AM  
  
No results for input(s): PH, PCO2, PO2 in the last 72 hours. No results for input(s): CPK, CKNDX, TROIQ in the last 72 hours. No lab exists for component: CPKMB Lab Results Component Value Date/Time Cholesterol, total 129 04/16/2021 10:40 AM  
 HDL Cholesterol 33 04/16/2021 10:40 AM  
 LDL, calculated 74 04/16/2021 10:40 AM  
 Triglyceride 110 04/16/2021 10:40 AM  
 CHOL/HDL Ratio 3.9 04/16/2021 10:40 AM  
 
Lab Results Component Value Date/Time Glucose (POC) 152 (H) 04/25/2021 09:54 PM  
 Glucose (POC) 146 (H) 04/25/2021 04:43 PM  
 Glucose (POC) 104 (H) 04/25/2021 12:19 PM  
 Glucose (POC) 93 04/25/2021 10:26 AM  
 Glucose (POC) 130 (H) 04/24/2021 08:32 PM  
 
Lab Results Component Value Date/Time Color YELLOW/STRAW 04/18/2021 05:08 PM  
 Appearance CLEAR 04/18/2021 05:08 PM  
 Specific gravity 1.016 04/18/2021 05:08 PM  
 Specific gravity 1.013 11/09/2020 12:05 AM  
 pH (UA) 6.5 04/18/2021 05:08 PM  
 Protein 30 (A) 04/18/2021 05:08 PM  
 Glucose Negative 04/18/2021 05:08 PM  
 Ketone Negative 04/18/2021 05:08 PM  
 Bilirubin Negative 04/18/2021 05:08 PM  
 Urobilinogen 0.2 04/18/2021 05:08 PM  
 Nitrites Negative 04/18/2021 05:08 PM  
 Leukocyte Esterase SMALL (A) 04/18/2021 05:08 PM  
 Epithelial cells FEW 04/18/2021 05:08 PM  
 Bacteria Negative 04/18/2021 05:08 PM  
 WBC 0-4 04/18/2021 05:08 PM  
 RBC 0-5 04/18/2021 05:08 PM  
 
 
 
Medications Reviewed:  
 
Current Facility-Administered Medications Medication Dose Route Frequency  0.9% sodium chloride infusion 250 mL  250 mL IntraVENous PRN  
 budesonide-formoteroL (SYMBICORT) 160-4.5 mcg/actuation HFA inhaler 1 Puff (Patient Supplied)  1 Puff Inhalation BID RT  
 bumetanide (BUMEX) injection 2 mg  2 mg IntraVENous TID  potassium chloride SR (KLOR-CON 10) tablet 20 mEq  20 mEq Oral DAILY  albuterol (PROVENTIL HFA, VENTOLIN HFA, PROAIR HFA) inhaler 2 Puff (Patient Supplied)  2 Puff Inhalation Q4H PRN  
 epoetin amalia-epbx (RETACRIT) injection 20,000 Units  20,000 Units SubCUTAneous Q MON, WED & FRI  
 oxymetazoline (AFRIN) 0.05 % nasal spray 2 Spray  2 Spray Both Nostrils BID PRN  
 sodium chloride (OCEAN) 0.65 % nasal squeeze bottle 2 Spray  2 Spray Both Nostrils Q2H PRN  therapeutic multivitamin (THERAGRAN) tablet 1 Tab  1 Tab Oral DAILY  polyethylene glycol (MIRALAX) packet 17 g  17 g Oral DAILY  isosorbide mononitrate ER (IMDUR) tablet 30 mg  30 mg Oral DAILY  cephALEXin (KEFLEX) capsule 250 mg  250 mg Oral BID  sodium chloride (NS) flush 5-40 mL  5-40 mL IntraVENous Q8H  
 sodium chloride (NS) flush 5-40 mL  5-40 mL IntraVENous PRN  
 acetaminophen (TYLENOL) tablet 650 mg  650 mg Oral Q4H PRN  
 hydrALAZINE (APRESOLINE) 20 mg/mL injection 10 mg  10 mg IntraVENous Q4H PRN  
 hydrALAZINE (APRESOLINE) tablet 100 mg  100 mg Oral TID  hydrOXYzine HCL (ATARAX) tablet 10 mg  10 mg Oral TID PRN  
 insulin glargine (LANTUS) injection 20 Units  20 Units SubCUTAneous BID  PARoxetine (PAXIL) tablet 20 mg  20 mg Oral DAILY  ranolazine ER (RANEXA) tablet 1,000 mg  1,000 mg Oral BID  tafamidis cap 61 mg (Patient Supplied)  61 mg Oral DAILY  warfarin dosing per pharmacy   Other Rx Dosing/Monitoring  miconazole (MICOTIN) 2 % powder   Topical BID  
 
______________________________________________________________________ EXPECTED LENGTH OF STAY: 5d 7h 
ACTUAL LENGTH OF STAY:          9 Greg Obrien MD

## 2021-04-26 NOTE — PROGRESS NOTES
[address] in [place], [place] Inpatient Consult Progress Note Patient name: Negro Jenkins Patient : 1950 Patient MRN: [id number] Consulting MD: Diane Arboleda MD 
Date of service: 21 CHIEF COMPLAINT: 
Dyspnea 
  
PLAN: 
· RHC  showed adequate Sal CI 3.0 but severely elevated RA pressure 24 mmHg · Nephrology consult for worsening renal function; d/w Dr. Maya Quezada, may require initiation of HD today · ID consult for pancytopenia and persistent sed rate appreciated; ?alternative abx agent - recommend against stopping antibiotics altogether due to chronic SWI/suspected pump infection and high risk of clinical deterioration if infection is not suppressed · Neurology consult appreciated for severe myoclonal jerking; suspect metabolic encephalopathy - possible offending agents held · Palliative Care Consult to discuss goals and limits of care including HD - patient is a poor candidate for HD due to:  
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 Continue increased device speed to 9600rpm due to LVEDD up to 8.3cm TTE  shows improved LVIDd, 6.68 cm with RVIDd 5.14 cm and TAPSE 1.1 cm Repeat TTE today Previously was intolerant to higher speeds due to VT; observe closely Volume management per Nephrology Fletcher for strict I/O Intolerant to BB/ACEi/ARB/ARNI due to aTTR Continue tafamidis 61 mg PO daily Hydralazine 100 mg po TID Continue Imdur 30 mg PO daily Intolerant of spironolactone due to hyperkalemia, renal failure Coumadin dose per pharmacy; goal INR lowered to 1.8-2.5 due to GIB Continue ranolazine, no ectopy - followed by Dr. Tejinder Giles OP Antibiotics for chronic sternal wound infection per ID - continue Keflex po Check FOB due to anemia Repeat iron/ferritin in AM  
Daily ammonia levels TSH 3.99; Synthroid on hold due to tremors Use home Trilogy when napping and QHS PFTs when euvolemic  
Continue Miralax daily while aggressively diuresing PT/OT Palliative care consult appreciated; patient remains full code DM management per Hospitalist  
Up to date on flu, PNA, and COVID vaccinations IMPRESSION: 
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 Arias Amaya) · C/b delayed extubation due to severe COPD 
· C/b critical illness polyneuropathy · C/b prolonged hospitalization post-LVAD, 55 days · C/b sternal wound infection s/p debridement (by Dr. Porsche White) s/p wound vac · C/b sacral ulcer · Would culture positive for Staph aureus, not MRSA · C/b LVAD site drainage, improved S/p CRT-ICD · ICD fired due to electrolyte imbalance (2011) H/o breast cancer (1992) · s/p bilateral mastectomy/chemo and endometrial cancer s/p hysterectomy · Lymphedema of LLE due to cancer treatment Severe COPD with FEV1 50% Depression Atrial fibrillation H/o \"two mini strokes\" S/p fall with hip facture · Right hip hemmiarthroplasty (5/23/18) by Dr. Hakan Wade) · S/p removed hip hardwarare due to pain (4/15/19) COPD severe CKD, stage 4 Hyperkalemia, resolved Pulmonary hypertension Cardiac risk factors: · Morbid obesity, Body mass index is 42.29 kg/m². · DM2 insulin dependent · JED on Trilogy · HL Urinary incontinence, severe · no procedures due to anticoagulation Endometrial cancer (2002) HTN 
HL 
  
CARDIAC IMAGING: 
Echo (9/24/19) LVEF 20-25%, AV opens 1:1, no AR Echo (5/29/18) LVEF 10-%, ramps study done, report in [facility] Echo (1/9/18) ramp study done, LVEDD 7.1cm LHC (11/9/18) 2 vessel disease with 90% OM, 80% PDA, DSA to PDA branch 
  INTERVAL HISTORY: 
1.7L UOP Cr 4.3 from 4.02 Hgb 8.1 s/p 1 unit PRBCs 
proBNP [id number] from 68716 INR 1.8 LVAD INTERROGATION: 
Device interrogated in person Device function normal, normal flow, no events LVAD Pump Speed (RPM): 1292 Pump Flow (LPM): 6 MAP: 82 
PI (Pulsitility Index): 4.7 Power: 6.6  Test: No 
Back Up  at Bedside & Labeled: Yes Power Module Test: No 
Driveline Site Care: No 
Driveline Dressing: Clean, Dry, and Intact Outpatient: No 
MAP in Therapeutic Range (Outpatient): Yes Testing Alarms Reviewed: Yes 
Back up SC speed: 9600 Back up Low Speed Limit: 9200 Emergency Equipment with Patient?: No 
Emergency procedures reviewed?: No 
Drive line site inspected?: No 
Drive line intergrity inspected?: Yes Drive line dressing changed?: No 
 
PHYSICAL EXAM: 
Visit Vitals BP (!) 80/0 Pulse 90 Temp 98.4 °F (36.9 °C) Resp 20 Ht 5' 7\" (1.702 m) Wt 260 lb (117.9 kg) SpO2 92% BMI 40.72 kg/m² General: Patient is well developed, confused. Frequent, significant myoclonal jerking HEENT: Normocephalic and atraumatic. No scleral icterus. Pupils are equal, round and reactive to light and accomodation. No conjunctival injection. Oropharynx is clear. Flushed cheeks. Neck: Supple.  No evidence of thyroid enlargements or lymphadenopathy. JVD: Cannot be appreciated Lungs: Breath sounds are equal and clear bilaterally. + wheezing. Heart: Regular rate and rhythm with normal S1 and S2. No murmurs, gallops or rubs. Abdomen: Soft, no mass or tenderness. No organomegaly or hernia. Bowel sounds present. Genitourinary and rectal: deferred Extremities: BLE + venous stasis Neurologic: Generalized myoclonal jerking, intentional tremors, +asterixis Psychiatric: Confused, but awake Skin: Warm, dry and well perfused. No lesions, nodules or rashes are noted. REVIEW OF SYSTEMS: 
General: Denies fever, night sweats. Ear, nose and throat: Denies difficulty hearing, sinus problems, runny nose, post-nasal drip, ringing in ears, mouth sores, loose teeth, ear pain, nosebleeds, sore throate, facial pain or numbess Cardiovascular: see above in the interval history Respiratory: Denies cough, wheezing, sputum production, hemoptysis. Gastrointestinal: Denies heartburn, constipation, intolerance to certain foods, diarrhea, abdominal pain, nausea, vomiting, difficulty swallowing, blood in stool Kidney and bladder: Denies painful urination, frequent urination, urgency, prostate problems and impotence Musculoskeletal: Denies joint pain, muscle weakness Skin and hair: Denies change in existing skin lesions, hair loss or increase, breast changes PAST MEDICAL HISTORY: 
Past Medical History:  
Diagnosis Date  Asthma  Cancer ([facility] breast  
 Cancer ([facility]   
 endometrial  
 Congestive heart failure, unspecified  CRI (chronic renal insufficiency)  Depression  Diabetes ([facility]  Hypertension PAST SURGICAL HISTORY: 
Past Surgical History:  
Procedure Laterality Date  HX HERNIA REPAIR    
 HX HYSTERECTOMY  HX MASTECTOMY FAMILY HISTORY: 
No family history on file. SOCIAL HISTORY: 
Social History Socioeconomic History  Marital status:  Spouse name: Not on file  Number of children: Not on file  Years of education: Not on file  Highest education level: Not on file Tobacco Use  Smoking status: Never Smoker  Smokeless tobacco: Never Used Substance and Sexual Activity  Alcohol use: Not Currently LABORATORY RESULTS: 
  
Labs Latest Ref Rng & Units 4/26/2021 4/25/2021 4/25/2021 4/25/2021 4/24/2021 4/24/2021 4/23/2021 WBC 3.6 - 11.0 K/uL 4.9 - 3. 5(L) - - - -  
RBC 3.80 - 5.20 M/uL 3.13(L) - 2.63(L) - - - - Hemoglobin 11.5 - 16.0 g/dL 8. 1(L) 8.2(L) 6. 7(L) - - - - Hematocrit 35.0 - 47.0 % 27. 8(L) 27. 5(L) 23. 5(L) - - - - MCV 80.0 - 99.0 FL 88.8 - 89.4 - - - - Platelets 099 - 612 K/uL 130(L) - 127(L) - - - - Lymphocytes 12 - 49 % 12 - 16 - - - - Monocytes 5 - 13 % 8 - 10 - - - - Eosinophils 0 - 7 % 0 - 0 - - - - Basophils 0 - 1 % 0 - 0 - - - - Albumin 3.5 - 5.0 g/dL - - 3.6 3.5 4.0 3.8 4.0 Calcium 8.5 - 10.1 MG/DL 9.0 - 8. 2(L) 8.6 9.0 9.0 9.0 Glucose 65 - 100 mg/dL 108(H) - 54(L) 54(L) 87 91 81 BUN 6 - 20 MG/DL 75(H) - 66(H) 66(H) 63(H) 60(H) 56(H) Creatinine 0.55 - 1.02 MG/DL 4.29(H) - 4.04(H) 4.02(H) 3.79(H) 3.76(H) 3.68(H) Sodium 136 - 145 mmol/L 136 - 138 138 137 137 138 Potassium 3.5 - 5.1 mmol/L 4.2 - 3.9 3.9 3.8 3.8 4.0 TSH 0.36 - 3.74 uIU/mL - - - - - 5.36(H) -  
LDH 81 - 246 U/L 185 - - - - 164 - Some recent data might be hidden Lab Results Component Value Date/Time TSH 5.36 (H) 04/24/2021 04:32 AM  
 TSH 3.99 (H) 04/17/2021 04:54 AM  
 TSH 2.980 10/01/2020 12:00 AM  
 
 
ALLERGY: 
Allergies Allergen Reactions  Benzodiazepines Other (comments) Respiratory issues CURRENT MEDICATIONS: 
 
Current Facility-Administered Medications:  
  warfarin (COUMADIN) tablet 4 mg, 4 mg, Oral, ONCE, Olga De Leon MD 
  0.9% sodium chloride infusion 250 mL, 250 mL, IntraVENous, PRN, Ara Waters NP 
  budesonide-formoteroL (SYMBICORT) 160-4.5 mcg/actuation HFA inhaler 1 Puff (Patient Supplied), 1 Puff, Inhalation, BID RT, Nicholas Reno MD, 1 Puff at 04/26/21 1015   bumetanide (BUMEX) injection 2 mg, 2 mg, IntraVENous, TID, PollHiginio torres Lax T, NP, 2 mg at 04/26/21 4255   potassium chloride SR (KLOR-CON 10) tablet 20 mEq, 20 mEq, Oral, DAILY, Polliargeoff, Higinio Lax T, NP, 20 mEq at 04/26/21 0941 
  albuterol (PROVENTIL HFA, VENTOLIN HFA, PROAIR HFA) inhaler 2 Puff (Patient Supplied), 2 Puff, Inhalation, Q4H PRN, Nicholas Reno MD 
  epoetin amalia-epbx (RETACRIT) injection 20,000 Units, 20,000 Units, SubCUTAneous, Q MON, WED & Esperanza Pro MD, 20,000 Units at 04/23/21 2257   oxymetazoline (AFRIN) 0.05 % nasal spray 2 Spray, 2 Spray, Both Nostrils, BID PRN, Polliard, Jayla Repress, NP, 2 [place] at 04/22/21 1800 
  sodium chloride (OCEAN) 0.65 % nasal squeeze bottle 2 Spray, 2 Spray, Both Nostrils, Q2H PRN, Polliard, Jayla Repress, NP 
  therapeutic multivitamin (THERAGRAN) tablet 1 Tab, 1 Tab, Oral, DAILY, Polliard, Jayla Repress, NP, 1 Tab at 04/26/21 0716   polyethylene glycol (MIRALAX) packet 17 g, 17 g, Oral, DAILY, Polliard, Higinio Lax T, NP, 17 g at 04/26/21 1009   isosorbide mononitrate ER (IMDUR) tablet 30 mg, 30 mg, Oral, DAILY, Polliard, Higinio Lax T, NP, 30 mg at 04/26/21 6549   cephALEXin (KEFLEX) capsule 250 mg, 250 mg, Oral, BID, Ester Zhou MD, 250 mg at 04/26/21 0941 
  sodium chloride (NS) flush 5-40 mL, 5-40 mL, IntraVENous, Q8H, Pollbrian Dana T, NP, 10 mL at 04/26/21 0629 
  sodium chloride (NS) flush 5-40 mL, 5-40 mL, IntraVENous, PRN, Polliard, Higinio Lax T, NP, 10 mL at 04/22/21 1238   acetaminophen (TYLENOL) tablet 650 mg, 650 mg, Oral, Q4H PRN, Polliard, Jayla Repress, NP, 650 mg at 04/18/21 8201   hydrALAZINE (APRESOLINE) 20 mg/mL injection 10 mg, 10 mg, IntraVENous, Q4H PRN, Polliard, Higinio Lax T, NP, 10 mg at 04/21/21 8652   hydrALAZINE (APRESOLINE) tablet 100 mg, 100 mg, Oral, TID, Polliard, Higinio Lax T, NP, 100 mg at 04/26/21 6741   hydrOXYzine HCL (ATARAX) tablet 10 mg, 10 mg, Oral, TID PRN, Anjali Walter NP 
  insulin glargine (LANTUS) injection 20 Units, 20 Units, SubCUTAneous, BID, Anjali Walter NP, 20 Units at 04/26/21 5323   PARoxetine (PAXIL) tablet 20 mg, 20 mg, Oral, DAILY, iDlcia Walter NP, 20 mg at 04/26/21 0941 
  ranolazine ER (RANEXA) tablet 1,000 mg, 1,000 mg, Oral, BID, Anjali Walter NP, 1,000 mg at 04/26/21 0649   tafamidis cap 61 mg (Patient Supplied), 61 mg, Oral, DAILY, Dilcia Walter NP, 61 mg at 04/26/21 0900   warfarin dosing per pharmacy, , Other, Rx Dosing/Monitoring, Anjali Walter NP 
  miconazole (MICOTIN) 2 % powder, , Topical, BID, Denise Lei MD, Given at 04/26/21 0107 PATIENT CARE TEAM: 
Patient Care Team: 
Galindo Chisholm NP as PCP - General (Nurse Practitioner) Narda Gonzales MD (Cardiology) Cherilyn Landau, MD as Physician (Cardiology) Thank you for allowing me to participate in this patient's care. TEA [address], [address] Phone: [phone] Fax: [phone]

## 2021-04-26 NOTE — PROGRESS NOTES
ID Progress Note 2021 Subjective: No new complaints Worsening of SOB/HAIRSTON Review of Systems: 
         
Symptom Y/N Comments   Symptom Y/N Comments Fever/Chills n      Chest Pain  n     
Poor Appetite       Edema  y Cough       Abdominal Pain Sputum       Joint Pain SOB/HAIRSTON y      Pruritis/Rash Nausea/vomit  n     Tolerating PT/OT Diarrhea  n     Tolerating Diet Constipation  n     Other Could NOT obtain due to:    
 
Objective:  
 
Vitals:  
Visit Vitals BP (!) 80/0 Pulse 88 Temp 98.3 °F (36.8 °C) Resp 20 Ht 5' 7\" (1.702 m) Wt 117.9 kg (260 lb) SpO2 98% BMI 40.72 kg/m² Tmax:  Temp (24hrs), Av.6 °F (37 °C), Min:98.3 °F (36.8 °C), Max:99.1 °F (37.3 °C) PHYSICAL EXAM: 
General: morbidly obese, Chronically ill appearing, Alert, cooperative, no acute distress EENT:  EOMI. Anicteric sclerae. MMM Resp:  Clear in apex with a few crackles at bases, no wheezing or rales. No accessory muscle use CV:  Regular  rhythm,  trace of pitting edema, LVAD hum GI:  Soft, Non distended, Non tender. +Bowel sounds Neurologic:  Alert and oriented X 3, normal speech, Psych:   Good insight. Not anxious nor agitated Skin:  No rashes. No jaundice, Venous stasis Labs:  
Lab Results Component Value Date/Time WBC 4.9 2021 02:45 AM  
 HGB 8.1 (L) 2021 02:45 AM  
 HCT 27.8 (L) 2021 02:45 AM  
 PLATELET 920 (L)  02:45 AM  
 MCV 88.8 2021 02:45 AM  
 
Lab Results Component Value Date/Time  Sodium 136 2021 02:49 AM  
 Potassium 4.2 2021 02:49 AM  
 Chloride 99 2021 02:49 AM  
 CO2 32 2021 02:49 AM  
 Anion gap 5 2021 02:49 AM  
 Glucose 108 (H) 2021 02:49 AM  
 BUN 75 (H) 2021 02:49 AM  
 Creatinine 4.29 (H) 2021 02:49 AM  
 BUN/Creatinine ratio 17 2021 02:49 AM  
 GFR est AA 12 (L) 2021 02:49 AM  
 GFR est non-AA 10 (L) 2021 02:49 AM  
 Calcium 9.0 04/26/2021 02:49 AM  
 Bilirubin, total 0.6 04/25/2021 03:42 AM  
 Alk. phosphatase 43 (L) 04/25/2021 03:42 AM  
 Protein, total 7.3 04/25/2021 03:42 AM  
 Albumin 3.6 04/25/2021 03:43 AM  
 Globulin 3.8 04/25/2021 03:42 AM  
 A-G Ratio 0.9 (L) 04/25/2021 03:42 AM  
 ALT (SGPT) 8 (L) 04/25/2021 03:42 AM  
 
 
 
Assessment and Plan Elevated sed rate - trending down 59 (4/18) from 71 on admission Afebrile Wbc 4.9 Continue with keflex  
   -> keflex was started as suppressive therapy for chronic sternal wound infection which has been completely healed. No active source of infection at this time. It is OK to stop the ABX if hematologist contribute leukopneia is secondary to beta lactam. 
   CBC with diff every other day has been ordered Fever work up if temp >= 100.4 
  
Pancytopenia 
- hematologist has been consulted  
  
Acute on chronic systolic heart failure - cardiology following ROCIO on CKD 
- nephrology following Pt will be seen as need. Please contact us with any questions or concerns.   
 
Oly Ledezma NP

## 2021-04-26 NOTE — PROGRESS NOTES
[facility] 
 [id number] [facility], [facility] [address] [facility] Phone: [phone]  
  
Nephrology Progress Note 
Vivien Arriaza     1950     [id number] Date of Admission : 4/16/2021 04/26/21 CC: Follow up for ROCIO Assessment and Plan ROCIO on CKD: 
- likely 2/2 progressive CKD and CRS 
- appears uremic despite her BUN level 
- rising Cr due to diuretics - will likely not tolerate weaning of diuretics or transition to oral at this time - plan to give her a trial of HD starting today 
- cont IV diuretics for now 
- not a great long-term HD candidate and she may not even tolerate HD well in the short term- discussed at length with daughter and she wishes to proceed with dialysis at this time Involuntary/jerky movements: 
- appreciate neurology 
- all offending meds off 
- dialysis as above - will see if this improves her asterixis and encephalopathy CKD stage III. - baseline around 2 
- presumed 2/2 DM, HTN. 
- Bone marrow Bx not done. - Renal US appearance of Kidneys no suggestive of Amyloidosis  
  
Chronic systolic CHF, stage C NYHA class IV 
-Combined ischemic and nonischemic cardiomyopathy 
-S/p HM 2 LVAD implant 4/15/2017 by Dr. Héctor Crews at [facility] 
- hx of recurrent VT : off mexiletine - chronic RV failure. 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 Chronic anemia :  
+ve PYP testing.  
- anemia of chronic disease  
- continue HEIKE Leucopenia, Thrombocytopenia   
Severe COPD. Pulmonary hypertension. Chronic A. fib. History of endometrial Ca ATTR amyloidosis Interval History: 
Seen and examined. Pt confused. Cr rising. Nonoliguric. Still volume overloaded. Daughter at bedside today. Pt denies cp or sob, but she is confused. On supplemental O2. MAPs stable per RN. Review of Systems: A comprehensive review of systems was negative except for that written in the HPI.  
 
Current Medications:  
Current Facility-Administered Medications Medication Dose Route Frequency  warfarin (COUMADIN) tablet 4 mg  4 mg Oral ONCE  
 0.9% sodium chloride infusion 250 mL  250 mL IntraVENous PRN  
 budesonide-formoteroL (SYMBICORT) 160-4.5 mcg/actuation HFA inhaler 1 Puff (Patient Supplied)  1 Puff Inhalation BID RT  
 bumetanide (BUMEX) injection 2 mg  2 mg IntraVENous TID  potassium chloride SR (KLOR-CON 10) tablet 20 mEq  20 mEq Oral DAILY  albuterol (PROVENTIL HFA, VENTOLIN HFA, PROAIR HFA) inhaler 2 Puff (Patient Supplied)  2 Puff Inhalation Q4H PRN  
 epoetin amalia-epbx (RETACRIT) injection 20,000 Units  20,000 Units SubCUTAneous Q MON, WED & FRI  
 oxymetazoline (AFRIN) 0.05 % nasal spray 2 Spray  2 Spray Both Nostrils BID PRN  
 sodium chloride (OCEAN) 0.65 % nasal squeeze bottle 2 Spray  2 Spray Both Nostrils Q2H PRN  therapeutic multivitamin (THERAGRAN) tablet 1 Tab  1 Tab Oral DAILY  polyethylene glycol (MIRALAX) packet 17 g  17 g Oral DAILY  isosorbide mononitrate ER (IMDUR) tablet 30 mg  30 mg Oral DAILY  cephALEXin (KEFLEX) capsule 250 mg  250 mg Oral BID  sodium chloride (NS) flush 5-40 mL  5-40 mL IntraVENous Q8H  
 sodium chloride (NS) flush 5-40 mL  5-40 mL IntraVENous PRN  
 acetaminophen (TYLENOL) tablet 650 mg  650 mg Oral Q4H PRN  
 hydrALAZINE (APRESOLINE) 20 mg/mL injection 10 mg  10 mg IntraVENous Q4H PRN  
 hydrALAZINE (APRESOLINE) tablet 100 mg  100 mg Oral TID  hydrOXYzine HCL (ATARAX) tablet 10 mg  10 mg Oral TID PRN  
 insulin glargine (LANTUS) injection 20 Units  20 Units SubCUTAneous BID  PARoxetine (PAXIL) tablet 20 mg  20 mg Oral DAILY  ranolazine ER (RANEXA) tablet 1,000 mg  1,000 mg Oral BID  tafamidis cap 61 mg (Patient Supplied)  61 mg Oral DAILY  warfarin dosing per pharmacy   Other Rx Dosing/Monitoring  miconazole (MICOTIN) 2 % powder   Topical BID Allergies Allergen Reactions  Benzodiazepines Other (comments)   Respiratory issues Objective: 
Vitals:  
Vitals:  
 04/25/21 2051 04/25/21 2300 04/26/21 0311 04/26/21 4437 BP:      
Pulse: 91 93 88 90 Resp: 26 17 22 20 Temp: 99 °F (37.2 °C) 98.3 °F (36.8 °C) 99.1 °F (37.3 °C) 98.4 °F (36.9 °C) SpO2: 92% 92% 91% 92% Weight:   117.9 kg (260 lb) Height:      
 
Intake and Output: 
04/26 0701 - 04/26 1900 In: 375 [P.O.:375] Out: 400 [Urine:400] 04/24 1901 - 04/26 0700 In: 822.5 [P.O.:480] Out: 2200 [Urine:2200] Physical Examination: 
General: Morbidly obese, in NAD Jossy Patricia Neck: Supple,no mass palpable Lungs : CTA  
CVS: RRR, VAD sounds Abdomen: Soft, NT, BS +, obese Extremities: LE discoloration, LUE lymphedema Neurologic: AOOX 3  
 : sahu 
  
 
[]    High complexity decision making was performed 
[]    Patient is at high-risk of decompensation with multiple organ involvement Lab Data Personally Reviewed: I have reviewed all the pertinent labs, microbiology data and radiology studies during assessment. Recent Labs  
  04/26/21 
0249 04/26/21 
0245 04/25/21 
0343 04/25/21 
0342 04/24/21 
4266   --  138 138 137  137  
K 4.2  --  3.9 3.9 3.8  3.8 CL 99  --  102 101 98  97 CO2 32  --  32 31 35*  35* *  --  54* 54* 87  91 BUN 75*  --  66* 66* 63*  60* CREA 4.29*  --  4.04* 4.02* 3.79*  3.76* CA 9.0  --  8.2* 8.6 9.0  9.0 MG 2.6*  --  2.6*  --  2.5* PHOS  --  4.2 3.7  --  4.1 ALB  --   --  3.6 3.5 4.0  3.8 ALT  --   --   --  8* 8* INR  --  1.8* 2.5*  --  2.7* Recent Labs  
  04/26/21 
0245 04/25/21 
1752 04/25/21 
0343 WBC 4.9  --  3.5* HGB 8.1* 8.2* 6.7* HCT 27.8* 27.5* 23.5*  
*  --  127* No results found for: SDES Lab Results Component Value Date/Time  Culture result: NO GROWTH 3 DAYS 04/23/2021 06:51 PM  
 Culture result: NO GROWTH 5 DAYS 11/10/2020 06:42 PM  
 Culture result: No Growth (<1000 cfu/mL) 11/09/2020 12:05 AM  
 Culture result: SCANT Morganella morganii ssp morganii (A) 10/30/2020 11:30 AM  
 Culture result: SCANT Morganella morganii ssp morganii (A) 10/30/2020 11:30 AM  
 Culture result: RARE DIPHTHEROIDS (A) 10/30/2020 11:30 AM  
 
Recent Results (from the past 24 hour(s)) GLUCOSE, POC Collection Time: 04/25/21 12:19 PM  
Result Value Ref Range Glucose (POC) 104 (H) 65 - 100 mg/dL Performed by Carley HAJI   
GLUCOSE, POC Collection Time: 04/25/21  4:43 PM  
Result Value Ref Range Glucose (POC) 146 (H) 65 - 100 mg/dL Performed by Carley HAJI   
HGB & HCT Collection Time: 04/25/21  5:52 PM  
Result Value Ref Range HGB 8.2 (L) 11.5 - 16.0 g/dL HCT 27.5 (L) 35.0 - 47.0 % GLUCOSE, POC Collection Time: 04/25/21  9:54 PM  
Result Value Ref Range Glucose (POC) 152 (H) 65 - 100 mg/dL Performed by Yolette Marlow (CON) PROTHROMBIN TIME + INR Collection Time: 04/26/21  2:45 AM  
Result Value Ref Range INR 1.8 (H) 0.9 - 1.1 Prothrombin time 18.3 (H) 9.0 - 11.1 sec NT-PRO BNP Collection Time: 04/26/21  2:45 AM  
Result Value Ref Range NT pro-BNP 11,438 (H) <125 PG/ML  
LD Collection Time: 04/26/21  2:45 AM  
Result Value Ref Range  81 - 246 U/L  
CBC WITH AUTOMATED DIFF Collection Time: 04/26/21  2:45 AM  
Result Value Ref Range WBC 4.9 3.6 - 11.0 K/uL  
 RBC 3.13 (L) 3.80 - 5.20 M/uL HGB 8.1 (L) 11.5 - 16.0 g/dL HCT 27.8 (L) 35.0 - 47.0 % MCV 88.8 80.0 - 99.0 FL  
 MCH 25.9 (L) 26.0 - 34.0 PG  
 MCHC 29.1 (L) 30.0 - 36.5 g/dL  
 RDW 18.5 (H) 11.5 - 14.5 % PLATELET 059 (L) 524 - 400 K/uL MPV 9.0 8.9 - 12.9 FL  
 NRBC 0.0 0  WBC ABSOLUTE NRBC 0.00 0.00 - 0.01 K/uL NEUTROPHILS 80 (H) 32 - 75 % LYMPHOCYTES 12 12 - 49 % MONOCYTES 8 5 - 13 % EOSINOPHILS 0 0 - 7 % BASOPHILS 0 0 - 1 % IMMATURE GRANULOCYTES 0 0.0 - 0.5 % ABS. NEUTROPHILS 3.9 1.8 - 8.0 K/UL  
 ABS. LYMPHOCYTES 0.6 (L) 0.8 - 3.5 K/UL  
 ABS. MONOCYTES 0.4 0.0 - 1.0 K/UL  
 ABS. EOSINOPHILS 0.0 0.0 - 0.4 K/UL  
 ABS. BASOPHILS 0.0 0.0 - 0.1 K/UL  
 ABS. IMM. GRANS. 0.0 0.00 - 0.04 K/UL  
 DF SMEAR SCANNED    
 RBC COMMENTS ANISOCYTOSIS 1+ 
    
 RBC COMMENTS HYPOCHROMIA 1+ 
    
 RBC COMMENTS OVALOCYTES PRESENT 
    
PHOSPHORUS Collection Time: 04/26/21  2:45 AM  
Result Value Ref Range Phosphorus 4.2 2.6 - 4.7 MG/DL MAGNESIUM Collection Time: 04/26/21  2:49 AM  
Result Value Ref Range Magnesium 2.6 (H) 1.6 - 2.4 mg/dL METABOLIC PANEL, BASIC Collection Time: 04/26/21  2:49 AM  
Result Value Ref Range Sodium 136 136 - 145 mmol/L Potassium 4.2 3.5 - 5.1 mmol/L Chloride 99 97 - 108 mmol/L  
 CO2 32 21 - 32 mmol/L Anion gap 5 5 - 15 mmol/L Glucose 108 (H) 65 - 100 mg/dL BUN 75 (H) 6 - 20 MG/DL Creatinine 4.29 (H) 0.55 - 1.02 MG/DL  
 BUN/Creatinine ratio 17 12 - 20 GFR est AA 12 (L) >60 ml/min/1.73m2 GFR est non-AA 10 (L) >60 ml/min/1.73m2 Calcium 9.0 8.5 - 10.1 MG/DL  
GLUCOSE, POC Collection Time: 04/26/21  7:13 AM  
Result Value Ref Range Glucose (POC) 115 (H) 65 - 100 mg/dL Performed by Renee Zavala (CON) POC EG7 Collection Time: 04/26/21  9:35 AM  
Result Value Ref Range Calcium, ionized (POC) 1.16 1.12 - 1.32 mmol/L  
 pH (POC) 7.37 7.35 - 7.45    
 pCO2 (POC) 57.7 (H) 35.0 - 45.0 MMHG  
 pO2 (POC) 90 80 - 100 MMHG  
 HCO3 (POC) 33.3 (H) 22 - 26 MMOL/L Base excess (POC) 8 mmol/L  
 sO2 (POC) 96 92 - 97 % Site RIGHT RADIAL Device: NASAL CANNULA Flow rate (POC) 3 L/M Allens test (POC) YES Specimen type (POC) ARTERIAL I have reviewed the flowsheets. Chart and Pertinent Notes have been reviewed. No change in PMH ,family and social history from Consult note.  
 
 
Quita Gongora MD

## 2021-04-26 NOTE — PROGRESS NOTES
ABGs this Am Patient refused turning [facility] notified of BMP + Bumex dose NSVTAC noted on tele. [facility] paged , orders received

## 2021-04-26 NOTE — CONSULTS
Palliative Medicine Goals clear at this time to pursue HD. Will cancel Palliative consult.  
Can reconsult us later if not tolerating HD

## 2021-04-26 NOTE — PROCEDURES
[facility]  [phone] Vitals   Pre   Post   Assessment   Pre   Post    
Temp  Temp: 98.3 °F (36.8 °C) (04/26/21 1834)  98.1 axillary LOC  ALERT TO NAME ONLY/TREMORS NO CHANGE  
HR   Pulse (Heart Rate): (!) 105 (04/26/21 1845) HR 89/LVAD/PACED Lungs   3LPM 02 VIA NC SPO2 94%  NO CHANGE  
B/P   BP: (78) (04/26/21 1845) MAP 78 Cardiac   LVAD/PACED  NO CHANGE Resp   Resp Rate: 24 (04/26/21 1845) RR 23 Skin   W/D/I  NO CHANGE Pain level  Pain Intensity 1: 0 (04/26/21 0311) NO C/O PAIN Edema  GENERALIZED 
 NO CHANGE Orders: Duration:   Start:    1837 End:    2037 Total:   2 HRS Dialyzer:   Dialyzer/Set Up Inspection: Noah Branch (04/26/21 1834) K Bath:   Dialysate K (mEq/L): 2 (04/26/21 1834) Ca Bath:   Dialysate CA (mEq/L): 2.5 (04/26/21 1834) Na/Bicarb:   Dialysate NA (mEq/L): 140 (04/26/21 1834) Target Fluid Removal:   Goal/Amount of Fluid to Remove (mL): 0 mL (04/26/21 1834) [facility]. No s/s of infection. Both lumens aspirate & flush well. Running well at  Labs Obtained/Reviewed Critical Results Called   Date when labs were drawn- 
Hgb-   
HGB Date Value Ref Range Status 04/26/2021 8.1 (L) 11.5 - 16.0 g/dL Final  
 
K-   
Potassium Date Value Ref Range Status 04/26/2021 4.3 3.5 - 5.1 mmol/L Final  
 
Ca-  
Calcium Date Value Ref Range Status 04/26/2021 9.2 8.5 - 10.1 MG/DL Final  
 
Bun-  
BUN Date Value Ref Range Status 04/26/2021 77 (H) 6 - 20 MG/DL Final  
 
Creat-  
Creatinine Date Value Ref Range Status 04/26/2021 4.15 (H) 0.55 - 1.02 MG/DL Final  
 
  
Medications/ Blood Products Given Name   Dose   Route and Time HEPARIN 1:1000 1. 1ML ART/1.4ML PHILIPP CATH DWELL Blood Volume Processed (BVP):    28.8 Net Fluid Removed:  0ML Comments Time Out Done: 9070 Primary Nurse Rpt Pre: Erika Hawkins RN 
Primary Nurse Rpt PostRollamando Romero RN 
Pt Education: SAFETY DURING DIALYSIS Care Plan: ONGOING Tx Summary: MAP OBTAINED MANUALLY Q15 D/T LVAD 
SBAR received from Primary RN. Consent signed & on file. 1837: Each catheter limb disinfected per p&p, caps removed, hubs disinfected per p&p. Each lumen aspirated for blood return and flushed with Normal Saline per policy. Labs drawn per request/ order. VSS. Dialysis Tx initiated. 2037: Tx ended. VSS. Each dialysis catheter limb disinfected per p&p, all possible blood returned per p&p, and each dialysis hub disinfected per p&p. Each lumen flushed, post dialysis catheter Heparin dwell instilled per order, and caps applied. Bed locked and in the lowest position, call bell and belongings in reach. SBAR given to Primary, RN. Patient is stable at time of my departure. All Dialysis related medications have been reviewed. Admiting Diagnosis: ROCIO/CHF Pt's previous clinic-N/A Consent signed - Informed Consent Verified: Yes (04/26/21 1834) Hepatitis Status- NEG/SUSC 4/21/21 Machine #- Machine Number: [id number] (04/26/21 9900) Telemetry status-PACED/BSM

## 2021-04-27 NOTE — PROGRESS NOTES
[facility] VAD/Heart Failure Progress Note    Admit Date: 2021  POD:  7 Days Post-Op    Procedure:  Procedure(s):  RIGHT HEART CATH        Subjective:   Still having tremors; making urine; contemplating HD; good flows      Objective:   Vitals:  Blood pressure (!) 80/0, pulse 73, temperature 97.8 °F (36.6 °C), resp. rate 18, height 5' 7\" (1.702 m), weight 261 lb 0.4 oz (118.4 kg), SpO2 100 %. Temp (24hrs), Av.9 °F (36.6 °C), Min:97.6 °F (36.4 °C), Max:98.3 °F (36.8 °C)    Hemodynamics:   CO:     CI:     CVP:     SVR:     PAP Systolic:     PAP Diastolic:     PVR:     VN16:     SCV02:      VAD Interrogation: LVAD (Heartmate)  Pump Speed (RPM): 9600  Pump Flow (LPM): 5.4  Chatter in Lines: No  PI (Pulsitility Index): 4.4  Power: 6.2  MAP: 82   Test: Yes  Back Up  at Bedside & Labeled: Yes  Power Module Test: Yes  Driveline Site Care: No  Driveline Dressing: Clean, Dry, and Intact    EKG/Rhythm:      Extubation Date / Time:     CT Output:     Ventilator:  Ventilator Volumes  Vt Spont (ml): 435 ml (21)  Ve Observed (l/min): 6.7 l/min (21)    Oxygen Therapy:  Oxygen Therapy  O2 Sat (%): 100 % (21)  Pulse via Oximetry: 85 beats per minute (21)  O2 Device: Nasal cannula (21 115)  O2 Flow Rate (L/min): 2 l/min (21 115)    CXR:    Admission Weight: Last Weight   Weight: 270 lb 6.4 oz (122.7 kg) Weight: 261 lb 0.4 oz (118.4 kg)     Intake / Output / Drain:  Current Shift:  0701 -  1900  In: 240 [P.O.:240]  Out: 200 [Urine:200]  Last 24 hrs.:     Intake/Output Summary (Last 24 hours) at 2021 1328  Last data filed at 2021 1151  Gross per 24 hour   Intake 440 ml   Output 1150 ml   Net -710 ml           No results for input(s): CPK, CKMB, TROIQ in the last 72 hours.   Recent Labs     21  0944 21  0517 21  1711 21  0249 21  0245 21  1752 21  0343    140 138 136  --   --  138   K 3.9 3.8 4.3 4.2  --   --  3.9   CO2 33* 30 32 32  --   --  32   BUN 56* 55* 77* 75*  --   --  66*   CREA 3.16* 3.03* 4.15* 4.29*  --   --  4.04*   * 62* 86 108*  --   --  54*   PHOS 3.5 3.5  --   --  4.2  --  3.7   MG  --  2.6* 2.7* 2.6*  --   --  2.6*   WBC  --  3.4*  --   --  4.9  --  3.5*   HGB  --  8.0*  --   --  8.1* 8.2* 6.7*   HCT  --  27.6*  --   --  27.8* 27.5* 23.5*   PLT  --  125*  --   --  130*  --  127*     Recent Labs     04/27/21  0517 04/26/21  0245 04/25/21  0343   INR 1.8* 1.8* 2.5*   PTP 18.1* 18.3* 24.9*     No lab exists for component: PBNP        Current Facility-Administered Medications:     warfarin (COUMADIN) tablet 4 mg, 4 mg, Oral, ONCE, Caroline Baugh MD    benzocaine-zinc cl-benzalkonium cl (ORAJEL) 20-0.1-0.02 % mucosal gel, , Oral, PRN, Caroline Baugh MD    heparin (porcine) 1,000 unit/mL injection 1,100 Units, 1,100 Units, InterCATHeter, DIALYSIS PRN, 1,100 Units at 04/26/21 1941 **AND** heparin (porcine) 1,000 unit/mL injection 1,400 Units, 1,400 Units, InterCATHeter, DIALYSIS PRN, Estela Isidro MD, 1,400 Units at 04/26/21 1942    glucose chewable tablet 16 g, 4 Tab, Oral, PRN, Caroline Baugh MD    dextrose (D50W) injection syrg 12.5-25 g, 12.5-25 g, IntraVENous, PRN, Caroline Baugh MD, 25 g at 04/26/21 2246    glucagon (GLUCAGEN) injection 1 mg, 1 mg, IntraMUSCular, PRN, Caroline Baugh MD    0.9% sodium chloride infusion 250 mL, 250 mL, IntraVENous, PRN, Pepito De Leon NP    budesonide-formoteroL (SYMBICORT) 160-4.5 mcg/actuation HFA inhaler 1 Puff (Patient Supplied), 1 Puff, Inhalation, BID RT, Caroline Baugh MD, 1 Puff at 04/26/21 1015    bumetanide (BUMEX) injection 2 mg, 2 mg, IntraVENous, TID, Vishal Walter NP, 2 mg at 04/27/21 0948    potassium chloride SR (KLOR-CON 10) tablet 20 mEq, 20 mEq, Oral, DAILY, Vishal Walter NP, 20 mEq at 04/27/21 1241    albuterol (PROVENTIL HFA, VENTOLIN HFA, PROAIR HFA) inhaler 2 Puff (Patient Supplied), 2 Puff, Inhalation, Q4H PRN, Dawson Campos MD    epoetin amalia-epbx (RETACRIT) injection 20,000 Units, 20,000 Units, SubCUTAneous, Q MON, WED & FRI, Reji, Jaspreet ABBASI MD, 20,000 Units at 04/26/21 2310    oxymetazoline (AFRIN) 0.05 % nasal spray 2 Spray, 2 Spray, Both Nostrils, BID PRN, PollCharan torres NP, 2 Spray at 04/22/21 1800    sodium chloride (OCEAN) 0.65 % nasal squeeze bottle 2 Spray, 2 Spray, Both Nostrils, Q2H PRN, Charan Walter NP    therapeutic multivitamin (THERAGRAN) tablet 1 Tab, 1 Tab, Oral, DAILY, Polliard, Joel Do T, NP, 1 Tab at 04/27/21 1241    polyethylene glycol (MIRALAX) packet 17 g, 17 g, Oral, DAILY, Polliard, Joel Do T, NP, 17 g at 04/27/21 1246    isosorbide mononitrate ER (IMDUR) tablet 30 mg, 30 mg, Oral, DAILY, Charan Watler NP, Stopped at 04/27/21 0900    cephALEXin (KEFLEX) capsule 250 mg, 250 mg, Oral, BID, Dirk Cockayne, MD, 250 mg at 04/27/21 1239    sodium chloride (NS) flush 5-40 mL, 5-40 mL, IntraVENous, Q8H, PollDana torres, NP, 10 mL at 04/27/21 0600    sodium chloride (NS) flush 5-40 mL, 5-40 mL, IntraVENous, PRN, Polliard [place] Do T, NP, 10 mL at 04/22/21 1238    acetaminophen (TYLENOL) tablet 650 mg, 650 mg, Oral, Q4H PRN, PolliardAndrésa MECHE, NP, 650 mg at 04/18/21 0807    hydrALAZINE (APRESOLINE) 20 mg/mL injection 10 mg, 10 mg, IntraVENous, Q4H PRN, Polliard, [place] Do T, NP, 10 mg at 04/21/21 5083    hydrALAZINE (APRESOLINE) tablet 100 mg, 100 mg, Oral, TID, Charan Walter NP, Stopped at 04/26/21 1743    hydrOXYzine HCL (ATARAX) tablet 10 mg, 10 mg, Oral, TID PRN, Charan Walter NP    insulin glargine (LANTUS) injection 20 Units, 20 Units, SubCUTAneous, BID, Charan Walter NP, 20 Units at 04/27/21 0954    PARoxetine (PAXIL) tablet 20 mg, 20 mg, Oral, DAILY, Joel Walter, TEA, 20 mg at 04/27/21 1240    ranolazine ER (RANEXA) tablet 1,000 mg, 1,000 mg, Oral, BID, Charan Walter NP, Stopped at 04/27/21 0900    tafamidis cap 61 mg (Patient Supplied), 61 mg, Oral, DAILY, Zuleima Walter NP, 61 mg at 04/27/21 1242    warfarin dosing per pharmacy, , Other, Rx Dosing/Monitoring, James Alexander NP    miconazole (MICOTIN) 2 % powder, , Topical, BID, Whitney Baig MD, Given at 04/27/21 8693    A/P     S/P LVAD - good flows  [facility]  Fluid overload - diuresis  Acute kidney injury - monitor      Risk of morbidity and mortality - high  Medical decision making - high complexity    Signed By: Jennifer Sol MD

## 2021-04-27 NOTE — PROGRESS NOTES
[address] [facility] Hospitalist Progress Note Ruben Segal MD 
Answering service: [phone] from in house phone NAME:  Ericka Anderson :  1950 MRN:  [id number] Admission Summary:  
Ericka Anderson is a 78 y.o. female with a past medical history of heart failure s/p LVAD, COPD, CKD stage 3, pulmonary HTN, Depression, HTN, and HLD who presented to [facility] appointment with complaints of chest tightness and increasing dyspnea. She was instructed to come to [facility] for admission for further treatment. At time of examination patient experiencing dyspnea but states she's feeling 100% better than what she felt yesterday. Interval history / Subjective:  
 
Patient seen and examined Heart failure team adjusting meds Creatinine elevated with plans for dialysis Severe tremors-appreciate- eval by neurology Assessment & Plan:  
 
 Acute on Chronic systolic congestive heart failure , s/p LVAD 
- NYHA class IV ;  s/p LVAD heartMate 2 , increased speed per HF team  
- Advanced heart failure team following and adjusting meds , anticoagulation with warfarin [facility]-  to measure heart pressures  
  
COPD-  Stopped nebs- resume home inhalers 
symbicort and albuterol Severe tremors- unclear etiology- stopped nebs and thyroid med Improved after blood transfusion, neurology eval completed- neurontin discontinued Subclinical hypothyroidism- normal free T4 with minimally elevated TSH Do not recommend any treatment or thyroid meds for these labs- DC synthroid Constipation with KUB suggesting fecal impaction- resolved after suppository CKD stage 3 Creatinine elevated due to diuresis Nephrology consulted and managing Adjusting bumex doses/regimen Plans for starting dialysis noted 
  
DM  
- A1c 7.2  
- Monitor BG AC/HS  
- Continue lantus 20 units  
  
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  
  
JED - Continue home trilogy Depression - cont Paroxetine H/o breast cancer (1992) · s/p bilateral mastectomy/chemo and endometrial cancer s/p hysterectomy Pancytopenia Etiology unknown at this point Hematology consulted for eval and tx Hb < 7 today; transfusion ordered Code status: full DVT prophylaxis: warfarin- therapeutic INR Care Plan discussed with: Patient/Family Anticipated Disposition: Home w/Family Anticipated Discharge: 24 hours to 48 hours Hospital Problems  Date Reviewed: 4/25/2021 Codes Class Noted POA Dyspnea ICD-10-CM: R06.00 
ICD-9-CM: 786.09  4/16/2021 Yes Coagulopathy ([facility] ICD-10-CM: F35.3 ICD-9-CM: 286.9  11/10/2020 Yes Open wound of left lower leg ICD-10-CM: Z12.919Y ICD-9-CM: 891.0  11/10/2020 Yes Anemia ICD-10-CM: D64.9 ICD-9-CM: 285.9  11/9/2020 Yes NICM (nonischemic cardiomyopathy) ([facility] ICD-10-CM: I42.8 ICD-9-CM: 425.4  11/4/2020 Yes Coronary artery disease involving native coronary artery of native heart without angina pectoris ICD-10-CM: I25.10 ICD-9-CM: 414.01  11/4/2020 Yes Chronic venous insufficiency ICD-10-CM: I87.2 ICD-9-CM: 459.81  11/4/2020 Yes Acute on chronic systolic CHF (congestive heart failure) (HCC) ICD-10-CM: M08.34 ICD-9-CM: 428.23, 428.0  10/27/2020 Yes Obesity, morbid ([facility] ICD-10-CM: E66.01 
ICD-9-CM: 278.01  10/1/2020 Yes Review of Systems: A comprehensive review of systems was negative except for that written in the HPI. Vital Signs:  
 Last 24hrs VS reviewed since prior progress note. Most recent are: 
Visit Vitals BP (!) 80/0 Pulse 74 Temp 97.6 °F (36.4 °C) Resp 18 Ht 5' 7\" (1.702 m) Wt 118.4 kg (261 lb 0.4 oz) SpO2 93% BMI 40.88 kg/m² Patient Vitals for the past 24 hrs: 
 Temp Pulse Resp SpO2 04/27/21 0439 97.6 °F (36.4 °C) 74 18 93 % 04/26/21 2308 98.1 °F (36.7 °C) 75 18 95 % 04/26/21 2238  71  (!) 85 % 04/26/21 2037 98.1 °F (36.7 °C) 80 20   
04/26/21 2014  91 26   
04/26/21 2000  91 (!) 31   
04/26/21 1945  90 28   
04/26/21 1930  98 23   
04/26/21 1915  98 21   
04/26/21 1900  96 19   
04/26/21 1845  (!) 105 24   
04/26/21 1834 98.3 °F (36.8 °C) 96 28 94 % 04/26/21 1601 97.6 °F (36.4 °C) 98 18 94 % 04/26/21 1200 98.3 °F (36.8 °C) 88 20 98 % 04/26/21 0744 98.4 °F (36.9 °C) 90 20 92 % Intake/Output Summary (Last 24 hours) at 4/27/2021 6377 Last data filed at 4/27/2021 8818 Gross per 24 hour Intake 575 ml Output 1450 ml Net -875 ml Physical Examination:  
     
Constitutional: NAD, awake and alert ENT:  Oral mucosa moist, Resp: Decreased breath sounds, no wheezing CV:  audible LVAD motor GI:  Soft, non distended, non tender. normoactive bowel sounds, no hepatosplenomegaly Musculoskeletal:  mild-mod edema, warm, wounds on bilat LE Neurologic:  Moves all extremities. AAOx3, CN II-XII reviewed, diffuse tremors in upper and lower extremities. Data Review:  
 Review and/or order of clinical lab test 
 
 
Labs:  
 
Recent Labs  
  04/27/21 
0517 04/26/21 
0245 WBC 3.4* 4.9 HGB 8.0* 8.1* HCT 27.6* 27.8*  
* 130* Recent Labs  
  04/26/21 
1711 04/26/21 
0249 04/26/21 
0245 04/25/21 
5431  136  --  138  
K 4.3 4.2  --  3.9 CL 99 99  --  102 CO2 32 32  --  32 BUN 77* 75*  --  66* CREA 4.15* 4.29*  --  4.04* GLU 86 108*  --  54* CA 9.2 9.0  --  8.2* MG 2.7* 2.6*  --  2.6* PHOS  --   --  4.2 3.7 Recent Labs  
  04/25/21 
0343 04/25/21 
0342 ALT  --  8* AP  --  43* TBILI  --  0.6 TP  --  7.3 ALB 3.6 3.5 GLOB  --  3.8 Recent Labs  
  04/27/21 
0517 04/26/21 
0245 04/25/21 0343 INR 1.8* 1.8* 2.5* PTP 18.1* 18.3* 24.9* No results for input(s): FE, TIBC, PSAT, FERR in the last 72 hours. Lab Results Component Value Date/Time Folate 10.0 10/28/2020 03:56 AM  
  
No results for input(s): PH, PCO2, PO2 in the last 72 hours. No results for input(s): CPK, CKNDX, TROIQ in the last 72 hours. No lab exists for component: CPKMB Lab Results Component Value Date/Time Cholesterol, total 129 04/16/2021 10:40 AM  
 HDL Cholesterol 33 04/16/2021 10:40 AM  
 LDL, calculated 74 04/16/2021 10:40 AM  
 Triglyceride 110 04/16/2021 10:40 AM  
 CHOL/HDL Ratio 3.9 04/16/2021 10:40 AM  
 
Lab Results Component Value Date/Time Glucose (POC) 110 (H) 04/26/2021 11:07 PM  
 Glucose (POC) 68 04/26/2021 10:20 PM  
 Glucose (POC) 98 04/26/2021 04:29 PM  
 Glucose (POC) 125 (H) 04/26/2021 11:59 AM  
 Glucose (POC) 115 (H) 04/26/2021 07:13 AM  
 
Lab Results Component Value Date/Time Color YELLOW/STRAW 04/18/2021 05:08 PM  
 Appearance CLEAR 04/18/2021 05:08 PM  
 Specific gravity 1.016 04/18/2021 05:08 PM  
 Specific gravity 1.013 11/09/2020 12:05 AM  
 pH (UA) 6.5 04/18/2021 05:08 PM  
 Protein 30 (A) 04/18/2021 05:08 PM  
 Glucose Negative 04/18/2021 05:08 PM  
 Ketone Negative 04/18/2021 05:08 PM  
 Bilirubin Negative 04/18/2021 05:08 PM  
 Urobilinogen 0.2 04/18/2021 05:08 PM  
 Nitrites Negative 04/18/2021 05:08 PM  
 Leukocyte Esterase SMALL (A) 04/18/2021 05:08 PM  
 Epithelial cells FEW 04/18/2021 05:08 PM  
 Bacteria Negative 04/18/2021 05:08 PM  
 WBC 0-4 04/18/2021 05:08 PM  
 RBC 0-5 04/18/2021 05:08 PM  
 
 
 
Medications Reviewed:  
 
Current Facility-Administered Medications Medication Dose Route Frequency  heparin (porcine) 1,000 unit/mL injection 1,100 Units  1,100 Units InterCATHeter DIALYSIS PRN  And  
 heparin (porcine) 1,000 unit/mL injection 1,400 Units  1,400 Units InterCATHeter DIALYSIS PRN  
 glucose chewable tablet 16 g  4 Tab Oral PRN  
 dextrose (D50W) injection syrg 12.5-25 g  12.5-25 g IntraVENous PRN  
 glucagon (GLUCAGEN) injection 1 mg  1 mg IntraMUSCular PRN  
 0.9% sodium chloride infusion 250 mL  250 mL IntraVENous PRN  
 budesonide-formoteroL (SYMBICORT) 160-4.5 mcg/actuation HFA inhaler 1 Puff (Patient Supplied)  1 Puff Inhalation BID RT  
 bumetanide (BUMEX) injection 2 mg  2 mg IntraVENous TID  potassium chloride SR (KLOR-CON 10) tablet 20 mEq  20 mEq Oral DAILY  albuterol (PROVENTIL HFA, VENTOLIN HFA, PROAIR HFA) inhaler 2 Puff (Patient Supplied)  2 Puff Inhalation Q4H PRN  
 epoetin amalia-epbx (RETACRIT) injection 20,000 Units  20,000 Units SubCUTAneous Q MON, WED & FRI  
 oxymetazoline (AFRIN) 0.05 % nasal spray 2 Spray  2 Spray Both Nostrils BID PRN  
 sodium chloride (OCEAN) 0.65 % nasal squeeze bottle 2 Spray  2 Spray Both Nostrils Q2H PRN  therapeutic multivitamin (THERAGRAN) tablet 1 Tab  1 Tab Oral DAILY  polyethylene glycol (MIRALAX) packet 17 g  17 g Oral DAILY  isosorbide mononitrate ER (IMDUR) tablet 30 mg  30 mg Oral DAILY  cephALEXin (KEFLEX) capsule 250 mg  250 mg Oral BID  sodium chloride (NS) flush 5-40 mL  5-40 mL IntraVENous Q8H  
 sodium chloride (NS) flush 5-40 mL  5-40 mL IntraVENous PRN  
 acetaminophen (TYLENOL) tablet 650 mg  650 mg Oral Q4H PRN  
 hydrALAZINE (APRESOLINE) 20 mg/mL injection 10 mg  10 mg IntraVENous Q4H PRN  
 hydrALAZINE (APRESOLINE) tablet 100 mg  100 mg Oral TID  hydrOXYzine HCL (ATARAX) tablet 10 mg  10 mg Oral TID PRN  
 insulin glargine (LANTUS) injection 20 Units  20 Units SubCUTAneous BID  PARoxetine (PAXIL) tablet 20 mg  20 mg Oral DAILY  ranolazine ER (RANEXA) tablet 1,000 mg  1,000 mg Oral BID  tafamidis cap 61 mg (Patient Supplied)  61 mg Oral DAILY  warfarin dosing per pharmacy   Other Rx Dosing/Monitoring  miconazole (MICOTIN) 2 % powder   Topical BID  
 
______________________________________________________________________ EXPECTED LENGTH OF STAY: 5d 7h 
ACTUAL LENGTH OF STAY:          11 
 
            
Aramice MD Clement

## 2021-04-27 NOTE — PROGRESS NOTES
1930: Bedside and Verbal shift change report given to oSraida Ortiz RN (oncoming nurse) by Brian Jang RN (offgoing nurse). Report included the following information SBAR, Kardex, Intake/Output, MAR, Recent Results and Cardiac Rhythm Paced. 2205: Monitor tech alerted nurse that patient had 13 beat run of VTach at 2146. RN went to assess patient. 2213: RN sent labwork to check potassium level. 2345: Bedside and Verbal shift change report given to Ann Bonilla RN (oncoming nurse) by Soraida Ortiz RN (offgoing nurse). Report included the following information SBAR, Kardex, Intake/Output, MAR, Recent Results and Cardiac Rhythm Paced.      0000: Potassium resulted at 3.8

## 2021-04-27 NOTE — PROGRESS NOTES
[facility] 
 [address] [address], [facility] [facility] Phone: [phone]  
  
Nephrology Progress Note 
Celestina Chester     1950     [id number] Date of Admission : 4/16/2021 04/27/21 CC: Follow up for ROCIO Assessment and Plan ROCIO on CKD: 
- likely 2/2 progressive CKD and CRS 
- dialysis initiated 4/26 
- HD #2 today 
- cont IV diuretics for now 
- not a great long-term HD candidate and she may not even tolerate HD well in the short term- discussed at length with daughter and she wishes to proceed with dialysis at this time Asterixis and encephalopathy 
- appreciate neurology 
- all offending meds off 
- improving with HD so far CKD stage III. - baseline around 2 
- presumed 2/2 DM, HTN. 
- Bone marrow Bx not done. - Renal US appearance of Kidneys not suggestive of Amyloidosis  
  
Chronic systolic CHF, stage C NYHA class IV 
-Combined ischemic and nonischemic cardiomyopathy 
-S/p HM 2 LVAD implant 4/15/2017 by Dr. Payal Del Castillo at [facility] 16 
- hx of recurrent VT : off mexiletine - chronic RV failure. 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 Chronic anemia :  
+ve PYP testing.  
- anemia of chronic disease  
- continue HEIKE Leucopenia, Thrombocytopenia   
Severe COPD. Pulmonary hypertension. Chronic A. fib. History of endometrial Ca ATTR amyloidosis Interval History: 
Seen and examined. Daughter at bedside. Feeling better. Tremors improving. Less confused. Tolerated 2 hrs of HD yesterday. Sharri Coreas Review of Systems: A comprehensive review of systems was negative except for that written in the HPI. Current Medications:  
Current Facility-Administered Medications Medication Dose Route Frequency  warfarin (COUMADIN) tablet 4 mg  4 mg Oral ONCE  
 heparin (porcine) 1,000 unit/mL injection 1,100 Units  1,100 Units InterCATHeter DIALYSIS PRN  And  
 heparin (porcine) 1,000 unit/mL injection 1,400 Units  1,400 Units InterCATHeter DIALYSIS PRN  
 glucose chewable tablet 16 g  4 Tab Oral PRN  
 dextrose (D50W) injection syrg 12.5-25 g  12.5-25 g IntraVENous PRN  
 glucagon (GLUCAGEN) injection 1 mg  1 mg IntraMUSCular PRN  
 0.9% sodium chloride infusion 250 mL  250 mL IntraVENous PRN  
 budesonide-formoteroL (SYMBICORT) 160-4.5 mcg/actuation HFA inhaler 1 Puff (Patient Supplied)  1 Puff Inhalation BID RT  
 bumetanide (BUMEX) injection 2 mg  2 mg IntraVENous TID  potassium chloride SR (KLOR-CON 10) tablet 20 mEq  20 mEq Oral DAILY  albuterol (PROVENTIL HFA, VENTOLIN HFA, PROAIR HFA) inhaler 2 Puff (Patient Supplied)  2 Puff Inhalation Q4H PRN  
 epoetin amalia-epbx (RETACRIT) injection 20,000 Units  20,000 Units SubCUTAneous Q MON, WED & FRI  
 oxymetazoline (AFRIN) 0.05 % nasal spray 2 Spray  2 Spray Both Nostrils BID PRN  
 sodium chloride (OCEAN) 0.65 % nasal squeeze bottle 2 Spray  2 Spray Both Nostrils Q2H PRN  therapeutic multivitamin (THERAGRAN) tablet 1 Tab  1 Tab Oral DAILY  polyethylene glycol (MIRALAX) packet 17 g  17 g Oral DAILY  isosorbide mononitrate ER (IMDUR) tablet 30 mg  30 mg Oral DAILY  cephALEXin (KEFLEX) capsule 250 mg  250 mg Oral BID  sodium chloride (NS) flush 5-40 mL  5-40 mL IntraVENous Q8H  
 sodium chloride (NS) flush 5-40 mL  5-40 mL IntraVENous PRN  
 acetaminophen (TYLENOL) tablet 650 mg  650 mg Oral Q4H PRN  
 hydrALAZINE (APRESOLINE) 20 mg/mL injection 10 mg  10 mg IntraVENous Q4H PRN  
 hydrALAZINE (APRESOLINE) tablet 100 mg  100 mg Oral TID  hydrOXYzine HCL (ATARAX) tablet 10 mg  10 mg Oral TID PRN  
 insulin glargine (LANTUS) injection 20 Units  20 Units SubCUTAneous BID  PARoxetine (PAXIL) tablet 20 mg  20 mg Oral DAILY  ranolazine ER (RANEXA) tablet 1,000 mg  1,000 mg Oral BID  tafamidis cap 61 mg (Patient Supplied)  61 mg Oral DAILY  warfarin dosing per pharmacy   Other Rx Dosing/Monitoring  miconazole (MICOTIN) 2 % powder   Topical BID  
  
Allergies Allergen Reactions  Benzodiazepines Other (comments) Respiratory issues Objective: 
Vitals:  
Vitals:  
 04/26/21 2238 04/26/21 2308 04/27/21 4168 04/27/21 5377 Pulse: 71 75 74 71 Resp:  18 18 16 Temp:  98.1 °F (36.7 °C) 97.6 °F (36.4 °C) 97.8 °F (36.6 °C) TempSrc:      
SpO2: (!) 85% 95% 93% 100% Weight:   118.4 kg (261 lb 0.4 oz) Height:      
 
Intake and Output: 
No intake/output data recorded. 04/25 1901 - 04/27 0700 In: 575 [P.O.:575] Out: 2150 [Urine:2150] Physical Examination: 
General: Morbidly obese, in NAD Mikci Snow Neck: Supple,no mass palpable Lungs : CTA  
CVS: RRR, VAD sounds Abdomen: Soft, NT, BS +, obese Extremities: LE discoloration, LUE lymphedema Neurologic: Awake, mild confusion, + asterixis Access: Alba Rife in place  : adelina 
  
 
[]    High complexity decision making was performed 
[]    Patient is at high-risk of decompensation with multiple organ involvement Lab Data Personally Reviewed: I have reviewed all the pertinent labs, microbiology data and radiology studies during assessment. Recent Labs  
  04/27/21 
0429 04/26/21 
1711 04/26/21 
0249 04/26/21 
0245 04/25/21 
0343 04/25/21 
8129 NA  --  138 136  --  138 138 K  --  4.3 4.2  --  3.9 3.9 CL  --  99 99  --  102 101 CO2  --  32 32  --  32 31 GLU  --  86 108*  --  54* 54* BUN  --  77* 75*  --  66* 66* CREA  --  4.15* 4.29*  --  4.04* 4.02* CA  --  9.2 9.0  --  8.2* 8.6 MG 2.6* 2.7* 2.6*  --  2.6*  --   
PHOS 3.5  --   --  4.2 3.7  --   
ALB  --   --   --   --  3.6 3.5 ALT  --   --   --   --   --  8* INR 1.8*  --   --  1.8* 2.5*  --   
 
Recent Labs  
  04/27/21 
0517 04/26/21 
0245 04/25/21 
1752 04/25/21 
0343 WBC 3.4* 4.9  --  3.5* HGB 8.0* 8.1* 8.2* 6.7* HCT 27.6* 27.8* 27.5* 23.5*  
* 130*  --  127* No results found for: SDES Lab Results Component Value Date/Time  Culture result: NO GROWTH 4 DAYS 04/23/2021 06:51 PM  
 Culture result: NO GROWTH 5 DAYS 11/10/2020 06:42 PM  
 Culture result: No Growth (<1000 cfu/mL) 11/09/2020 12:05 AM  
 Culture result: SCANT Morganella morganii ssp morganii (A) 10/30/2020 11:30 AM  
 Culture result: SCANT Morganella morganii ssp morganii (A) 10/30/2020 11:30 AM  
 Culture result: RARE DIPHTHEROIDS (A) 10/30/2020 11:30 AM  
 
Recent Results (from the past 24 hour(s)) GLUCOSE, POC Collection Time: 04/26/21 11:59 AM  
Result Value Ref Range Glucose (POC) 125 (H) 65 - 100 mg/dL Performed by Haile Obrien, POC Collection Time: 04/26/21  4:29 PM  
Result Value Ref Range Glucose (POC) 98 65 - 100 mg/dL Performed by Ledezma Shine AMMONIA Collection Time: 04/26/21  5:11 PM  
Result Value Ref Range Ammonia 26 <32 UMOL/L  
MAGNESIUM Collection Time: 04/26/21  5:11 PM  
Result Value Ref Range Magnesium 2.7 (H) 1.6 - 2.4 mg/dL METABOLIC PANEL, BASIC Collection Time: 04/26/21  5:11 PM  
Result Value Ref Range Sodium 138 136 - 145 mmol/L Potassium 4.3 3.5 - 5.1 mmol/L Chloride 99 97 - 108 mmol/L  
 CO2 32 21 - 32 mmol/L Anion gap 7 5 - 15 mmol/L Glucose 86 65 - 100 mg/dL BUN 77 (H) 6 - 20 MG/DL Creatinine 4.15 (H) 0.55 - 1.02 MG/DL  
 BUN/Creatinine ratio 19 12 - 20 GFR est AA 13 (L) >60 ml/min/1.73m2 GFR est non-AA 11 (L) >60 ml/min/1.73m2 Calcium 9.2 8.5 - 10.1 MG/DL  
GLUCOSE, POC Collection Time: 04/26/21 10:20 PM  
Result Value Ref Range Glucose (POC) 68 65 - 100 mg/dL Performed by Angelita Pro GLUCOSE, POC Collection Time: 04/26/21 11:07 PM  
Result Value Ref Range Glucose (POC) 110 (H) 65 - 100 mg/dL Performed by Angelita Pro PROTHROMBIN TIME + INR Collection Time: 04/27/21  5:17 AM  
Result Value Ref Range INR 1.8 (H) 0.9 - 1.1 Prothrombin time 18.1 (H) 9.0 - 11.1 sec NT-PRO BNP Collection Time: 04/27/21  5:17 AM  
Result Value Ref Range  NT pro-BNP 15,012 (H) <125 PG/ML  
MAGNESIUM Collection Time: 04/27/21  5:17 AM  
Result Value Ref Range Magnesium 2.6 (H) 1.6 - 2.4 mg/dL LD Collection Time: 04/27/21  5:17 AM  
Result Value Ref Range  81 - 246 U/L  
CBC WITH AUTOMATED DIFF Collection Time: 04/27/21  5:17 AM  
Result Value Ref Range WBC 3.4 (L) 3.6 - 11.0 K/uL  
 RBC 3.03 (L) 3.80 - 5.20 M/uL HGB 8.0 (L) 11.5 - 16.0 g/dL HCT 27.6 (L) 35.0 - 47.0 % MCV 91.1 80.0 - 99.0 FL  
 MCH 26.4 26.0 - 34.0 PG  
 MCHC 29.0 (L) 30.0 - 36.5 g/dL  
 RDW 18.6 (H) 11.5 - 14.5 % PLATELET 012 (L) 439 - 400 K/uL MPV 9.9 8.9 - 12.9 FL  
 NRBC 0.0 0  WBC ABSOLUTE NRBC 0.00 0.00 - 0.01 K/uL NEUTROPHILS 78 (H) 32 - 75 % LYMPHOCYTES 13 12 - 49 % MONOCYTES 9 5 - 13 % EOSINOPHILS 0 0 - 7 % BASOPHILS 0 0 - 1 % IMMATURE GRANULOCYTES 0 0.0 - 0.5 % ABS. NEUTROPHILS 2.7 1.8 - 8.0 K/UL  
 ABS. LYMPHOCYTES 0.4 (L) 0.8 - 3.5 K/UL  
 ABS. MONOCYTES 0.3 0.0 - 1.0 K/UL  
 ABS. EOSINOPHILS 0.0 0.0 - 0.4 K/UL  
 ABS. BASOPHILS 0.0 0.0 - 0.1 K/UL  
 ABS. IMM. GRANS. 0.0 0.00 - 0.04 K/UL  
 DF SMEAR SCANNED    
 RBC COMMENTS ANISOCYTOSIS 
1+ PHOSPHORUS Collection Time: 04/27/21  5:17 AM  
Result Value Ref Range Phosphorus 3.5 2.6 - 4.7 MG/DL  
AMMONIA Collection Time: 04/27/21  5:17 AM  
Result Value Ref Range Ammonia 56 (H) <32 UMOL/L FERRITIN Collection Time: 04/27/21  5:17 AM  
Result Value Ref Range Ferritin 150 8 - 252 NG/ML  
GLUCOSE, POC Collection Time: 04/27/21  8:17 AM  
Result Value Ref Range Glucose (POC) 65 65 - 100 mg/dL Performed by Frederick Herrera GLUCOSE, POC Collection Time: 04/27/21  8:42 AM  
Result Value Ref Range Glucose (POC) 72 65 - 100 mg/dL Performed by Mary Phoenix I have reviewed the flowsheets. Chart and Pertinent Notes have been reviewed. No change in PMH ,family and social history from Consult note.  
 
 
Marylen Cluster, MD

## 2021-04-27 NOTE — PROGRESS NOTES
Problem: Mobility Impaired (Adult and Pediatric)  Goal: *Acute Goals and Plan of Care (Insert Text)  Description: FUNCTIONAL STATUS PRIOR TO ADMISSION: Patient was modified independent using a rollator for functional mobility. HOME SUPPORT PRIOR TO ADMISSION: The patient lived alone with daughter to provide assistance. Will be staying with daughter once discharged. Physical Therapy Goals  Revised 4/26/2021  1. Patient will move from supine to sit and sit to supine , scoot up and down, and roll side to side in bed with minimal assistance/contact guard assist within 7 day(s). 2.  Patient will transfer from bed to chair and chair to bed with minimal assistance/contact guard assist using the least restrictive device within 7 day(s). 3.  Patient will perform sit to stand with minimal assistance/contact guard assist within 7 day(s). 4.  Patient will sit at EOB with supervision for balance engaged in task for 5 minutes within 7 day(s). Physical Therapy Goals  Initiated 4/17/2021  1. Patient will move from supine to sit and sit to supine  and scoot up and down in bed with modified independence within 7 day(s). 2.  Patient will transfer from bed to chair and chair to bed with modified independence using the least restrictive device within 7 day(s). 3.  Patient will perform sit to stand with modified independence within 7 day(s). 4.  Patient will ambulate with modified independence for 150 feet with the least restrictive device within 7 day(s). Outcome: Progressing Towards Goal    PHYSICAL THERAPY TREATMENT  Patient: Vivien Arriaza (77 y.o. female)  Date: 4/27/2021  Diagnosis: Dyspnea [R06.00] <principal problem not specified>  Procedure(s) (LRB):  RIGHT HEART CATH (N/A) 7 Days Post-Op  Precautions: (LVAD)  Chart, physical therapy assessment, plan of care and goals were reviewed. ASSESSMENT  Patient continues with skilled PT services and is progressing towards goals.  Pt agreeable to therapy. Pt reports tremors are better but still present in extremities. Pt did tolerated transfer to the chair but decrease LE support due to tremors. Pt tolerating in chair but fatigued with task. .     Current Level of Function Impacting Discharge (mobility/balance): Ax2 for safety    Other factors to consider for discharge: tremors, decrease activity tolerance           PLAN :  Patient continues to benefit from skilled intervention to address the above impairments. Continue treatment per established plan of care. to address goals. Recommendation for discharge: (in order for the patient to meet his/her long term goals)  To be determined: rehab     This discharge recommendation:  Has not yet been discussed the attending provider and/or case management    IF patient discharges home will need the following DME: patient owns DME required for discharge       SUBJECTIVE:   Patient stated I can try.     OBJECTIVE DATA SUMMARY:   Critical Behavior:  Neurologic State: Alert  Orientation Level: Oriented X4(intermittent confusion)  Cognition: Decreased attention/concentration  Safety/Judgement: Decreased insight into deficits, Decreased awareness of need for safety  Functional Mobility Training:  Bed Mobility:  Rolling: Minimum assistance     Sit to Supine: Moderate assistance;Assist x2; Additional time           Transfers:  Sit to Stand: Moderate assistance;Assist x2; Additional time  Stand to Sit: Moderate assistance;Assist x2; Additional time        Bed to Chair: Moderate assistance;Assist x2; Additional time                    Balance:  Sitting: Impaired; Without support  Sitting - Static: Fair (occasional)  Sitting - Dynamic: Poor (constant support)  Standing: Impaired; Without support  Standing - Static: Poor;Constant support  Standing - Dynamic : Poor;Constant support  Ambulation/Gait Training:                                                    Stairs:               Therapeutic Exercises:     Pain Rating:  none    Activity Tolerance:   Poor    After treatment patient left in no apparent distress:   Sitting in chair, Call bell within reach, Bed / chair alarm activated, and Caregiver / family present    COMMUNICATION/COLLABORATION:   The patients plan of care was discussed with: Registered nurse.      Arabella Zavaleta PTA   Time Calculation: 24 mins

## 2021-04-27 NOTE — PROGRESS NOTES
SPEECH PATHOLOGY BEDSIDE SWALLOW EVALUATION/DISCHARGE  Patient: Juan Monroy (62 y.o. female)  Date: 4/27/2021  Primary Diagnosis: Dyspnea [R06.00]  Procedure(s) (LRB):  RIGHT HEART CATH (N/A) 7 Days Post-Op   Precautions:   (LVAD)    ASSESSMENT :  Based on the objective data described below, the patient presents with [facility] [place] oral/pharyngeal swallow with no overt s/s of aspiration on thin, puree, and solid. Patient with lingual pain and reported sores in mouth. Skilled acute therapy provided by a speech-language pathologist is not indicated at this time. PLAN :  Recommendations:  - Regular/thin liquid diet, encouraged patient to choose food that is most comfortable for her   Discharge Recommendations: None     SUBJECTIVE:   Patient stated I enjoyed you two.     OBJECTIVE:     Past Medical History:   Diagnosis Date    Asthma     Cancer ([facility] [address]     breast    Cancer ([facility] [address]     endometrial    Congestive heart failure, unspecified     CRI (chronic renal insufficiency)     Depression     Diabetes (HCC)     Hypertension      Past Surgical History:   Procedure Laterality Date    HX HERNIA REPAIR      HX HYSTERECTOMY      HX MASTECTOMY      IR INSERT NON TUNL CVC OVER 5 YRS  4/26/2021     Prior Level of Function/Home Situation:   Home Situation  Home Environment: Private residence  # Steps to Enter: 0  One/Two Story Residence: One story  Living Alone: Yes  Support Systems: Child(ivania), Home care staff  Patient Expects to be Discharged to[de-identified] Private residence  Current DME Used/Available at Home: Glorai Slate, rollator  Tub or Shower Type: (sponge bathing)  Diet prior to admission: regular/thin liquid diet  Current Diet:  Regular/thin liquid diet    Cognitive and Communication Status:  Neurologic State: Alert  Orientation Level: Oriented X4(intermittent confusion)  Cognition: Decreased attention/concentration  Perception: Appears intact  Perseveration: No perseveration noted  Safety/Judgement: Decreased insight into deficits, Decreased awareness of need for safety  Oral Assessment:     P.O. Trials:  Patient Position: upright in bed  Vocal quality prior to P.O.: No impairment  Consistency Presented: Thin liquid; Solid;Puree  How Presented: Self-fed/presented;SLP-fed/presented;Straw;Spoon     Bolus Acceptance: No impairment  Bolus Formation/Control: No impairment     Propulsion: No impairment  Oral Residue: None  Initiation of Swallow: No impairment  Laryngeal Elevation: Functional  Aspiration Signs/Symptoms: None  Pharyngeal Phase Characteristics: No impairment, issues, or problems   Effective Modifications: None  Cues for Modifications: None       Oral Phase Severity: No impairment  Pharyngeal Phase Severity : No impairment  NOMS:   The NOMS functional outcome measure was used to quantify this patient's level of swallowing impairment. Based on the NOMS, the patient was determined to be at level 7 for swallow function     NOMS Swallowing Levels:  Level 1 (CN): NPO  Level 2 (CM): NPO but takes consistency in therapy  Level 3 (CL): Takes less than 50% of nutrition p.o. and continues with nonoral feedings; and/or safe with mod cues; and/or max diet restriction  Level 4 (CK): Safe swallow but needs mod cues; and/or mod diet restriction; and/or still requires some nonoral feeding/supplements  Level 5 (CJ): Safe swallow with min diet restriction; and/or needs min cues  Level 6 (CI): Independent with p.o.; rare cues; usually self cues; may need to avoid some foods or needs extra time  Level 7 ([address] [place] [address]): Independent for all p.o.  ABELARDO. (2003). National Outcomes Measurement System (NOMS): Adult Speech-Language Pathology User's Guide. Pain:  Pain Scale 1: Numeric (0 - 10)  Pain Intensity 1: 0     After treatment:   Patient left in no apparent distress in bed    COMMUNICATION/EDUCATION:   Patient was educated regarding her WFL swallow. She demonstrated Excellent understanding as evidenced by verbalizing understanding.     The patient's plan of care including recommendations, planned interventions, and recommended diet changes were discussed with: Registered nurse.      Thank you for this referral.  Raisa Jha  Time Calculation: 20 mins

## 2021-04-27 NOTE — PROGRESS NOTES
Problem: Falls - Risk of 
Goal: *Absence of Falls Description: Document Luís Gum Fall Risk and appropriate interventions in the flowsheet. Outcome: Progressing Towards Goal 
Note: Fall Risk Interventions: 
Mobility Interventions: Bed/chair exit alarm, Communicate number of staff needed for ambulation/transfer, Patient to call before getting OOB, PT Consult for mobility concerns, Utilize walker, cane, or other assistive device Medication Interventions: Bed/chair exit alarm, Evaluate medications/consider consulting pharmacy, Patient to call before getting OOB, Teach patient to arise slowly Elimination Interventions: Call light in reach, Patient to call for help with toileting needs, Toilet paper/wipes in reach, Toileting schedule/hourly rounds, Stay With Me (per policy) History of Falls Interventions: Consult care management for discharge planning, Bed/chair exit alarm, Investigate reason for fall, Evaluate medications/consider consulting pharmacy, Door open when patient unattended Problem: Patient Education: Go to Patient Education Activity Goal: Patient/Family Education Outcome: Progressing Towards Goal 
  
Problem: Pressure Injury - Risk of 
Goal: *Prevention of pressure injury Description: Document Jaime Scale and appropriate interventions in the flowsheet. Outcome: Progressing Towards Goal 
Note: Pressure Injury Interventions: 
Sensory Interventions: Assess changes in LOC, Avoid rigorous massage over bony prominences, Check visual cues for pain, Float heels, Keep linens dry and wrinkle-free, Minimize linen layers Moisture Interventions: Internal/External urinary devices, Absorbent underpads Activity Interventions: Increase time out of bed, Assess need for specialty bed Mobility Interventions: Float heels, HOB 30 degrees or less Nutrition Interventions: Document food/fluid/supplement intake, Offer support with meals,snacks and hydration Friction and Shear Interventions: Lift sheet, HOB 30 degrees or less Problem: Patient Education: Go to Patient Education Activity Goal: Patient/Family Education Outcome: Progressing Towards Goal 
  
Problem: Heart Failure: Day 5 Goal: Off Pathway (Use only if patient is Off Pathway) Outcome: Progressing Towards Goal 
Goal: Activity/Safety Outcome: Progressing Towards Goal 
Goal: Diagnostic Test/Procedures Outcome: Progressing Towards Goal 
Goal: Nutrition/Diet Outcome: Progressing Towards Goal 
Goal: Discharge Planning Outcome: Progressing Towards Goal 
Goal: Medications Outcome: Progressing Towards Goal 
Goal: Respiratory Outcome: Progressing Towards Goal 
Goal: Treatments/Interventions/Procedures Outcome: Progressing Towards Goal 
Goal: Psychosocial 
Outcome: Progressing Towards Goal 
  
Problem: Diabetes Self-Management Goal: *Disease process and treatment process Description: Define diabetes and identify own type of diabetes; list 3 options for treating diabetes. Outcome: Progressing Towards Goal 
Goal: *Incorporating nutritional management into lifestyle Description: Describe effect of type, amount and timing of food on blood glucose; list 3 methods for planning meals. Outcome: Progressing Towards Goal 
Goal: *Monitoring blood glucose, interpreting and using results Description: Identify recommended blood glucose targets  and personal targets. Outcome: Progressing Towards Goal 
Goal: *Prevention, detection, treatment of acute complications Description: List symptoms of hyper- and hypoglycemia; describe how to treat low blood sugar and actions for lowering  high blood glucose level. Outcome: Progressing Towards Goal 
  
Problem: Breathing Pattern - Ineffective Goal: *Absence of hypoxia Outcome: Progressing Towards Goal 
Goal: *Use of effective breathing techniques Outcome: Progressing Towards Goal 
Goal: *PALLIATIVE CARE:  Alleviation of Dyspnea Outcome: Progressing Towards Goal 
  
Problem: Nutrition Deficit Goal: *Optimize nutritional status Outcome: Progressing Towards Goal 
  
Problem: High Risk or History of JED Goal: Recognition of JED or High Risk for JED Outcome: Progressing Towards Goal 
Goal: Maintain Patent Airway and Adequate Oxygenation Outcome: Progressing Towards Goal

## 2021-04-27 NOTE — PROGRESS NOTES
[facility] in [place], [place]  Inpatient Consult Progress Note      Patient name: Patrica Mcenil  Patient : 1950  Patient MRN: [id number]  Consulting MD: Ann Mensah MD  Date of service: 21    CHIEF COMPLAINT:  Dyspnea     PLAN:  · RHC  showed adequate Sal CI 3.0 but severely elevated RA pressure 24 mmHg  · Continue HD for volume management; per Nephrology   · ID consult for pancytopenia and persistent sed rate appreciated; ?alternative abx agent - recommend against stopping antibiotics altogether due to chronic SWI/suspected pump infection and high risk of clinical deterioration if infection is not suppressed  · Neurology consult appreciated for severe myoclonal jerking; suspect metabolic encephalopathy - possible offending agents held   · Palliative Care Consult to discuss goals and limits of care including HD - patient is a poor candidate for HD due to:   · End stage HF with LVAD and RV failure  · Chronic, active infection which would likely seed HD line  · Difficult disposition if patient were to require outpatient HD (woud likely have to travel to [facility] several times per week to accomplish this)   · Discussed with daughter (mPOA), Kiesha Huerta; she and the patient understand concerns regarding patient's multiple co-morbidities, each with life limiting prognosis; they wish to pursue aggressive treatment including HD (if indicated) to prolong patient's life at home; goal is to spend time with her grandson, even if this means frequent trips to HD center.       Continue increased device speed to 9600rpm due to LVEDD up to 8.3cm  TTE  shows improved LVIDd, 6.68 cm with RVIDd 5.14 cm and TAPSE 1.1 cm   Repeat TTE today    Previously was intolerant to higher speeds due to VT; observe closely   Volume management per Nephrology  Justine for strict I/O   PCXR & SLP consult for possible aspiration   Intolerant to BB/ACEi/ARB/ARNI due to aTTR  Continue tafamidis 61 mg PO daily  Hydralazine 100 mg po TID  Continue Imdur 30 mg PO daily   Intolerant of spironolactone due to hyperkalemia, renal failure   Coumadin dose per pharmacy; goal INR lowered to 1.8-2.5 due to GIB  Continue ranolazine, no ectopy - followed by Dr. 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 Daniel Park)  · C/b delayed extubation due to severe COPD  · C/b critical illness polyneuropathy  · C/b prolonged hospitalization post-LVAD, 55 days  · C/b sternal wound infection s/p debridement (by Dr. Rose Medina) s/p wound vac  · C/b sacral ulcer  · Would culture positive for Staph aureus, not MRSA  · C/b LVAD site drainage, improved  S/p CRT-ICD  · ICD fired due to electrolyte imbalance (2011)  H/o breast cancer (1992)   · s/p bilateral mastectomy/chemo and endometrial cancer s/p hysterectomy  · Lymphedema of LLE due to cancer treatment  Severe COPD with FEV1 50%  Depression  Atrial fibrillation  H/o \"two mini strokes\"  S/p fall with hip facture · Right hip hemmiarthroplasty (5/23/18) by Dr. Hunter Abts)  · S/p removed hip hardwarare due to pain (4/15/19)  COPD severe  CKD, stage 4  Hyperkalemia, resolved  Pulmonary hypertension  Cardiac risk factors:  · Morbid obesity, Body mass index is 42.29 kg/m². · DM2 insulin dependent  · JED on Trilogy  · HL  Urinary incontinence, severe  · no procedures due to anticoagulation  Endometrial cancer (2002)  HTN  HL     CARDIAC IMAGING:  Echo (9/24/19) LVEF 20-25%, AV opens 1:1, no AR  Echo (5/29/18) LVEF 10-%, ramps study done, report in [facility]  Echo (1/9/18) ramp study done, LVEDD 7.1cm  C (11/9/18) 2 vessel disease with 90% OM, 80% PDA, DSA to PDA branch     INTERVAL HISTORY:  HD initiated   Cr 3.16 from 4.15    Hgb 8.0 stable   proBNP 16421 from [id number]  Myoclonal jerking improved   + cough this AM, ?aspiration    LVAD INTERROGATION:  Device interrogated in person  Device function normal, normal flow, no events    LVAD   Pump Speed (RPM): 9600  Pump Flow (LPM): 5.4  MAP: 84  PI (Pulsitility Index): 4.4  Power: 6.2   Test: Yes  Back Up  at Bedside & Labeled: Yes  Power Module Test: Yes  Driveline Site Care: No  Driveline Dressing: Clean, Dry, and Intact  Outpatient: No  MAP in Therapeutic Range (Outpatient): Yes  Testing  Alarms Reviewed: Yes  Back up SC speed: 9600  Back up Low Speed Limit: 9200  Emergency Equipment with Patient?: Yes  Emergency procedures reviewed?: Yes  Drive line site inspected?: Yes  Drive line intergrity inspected?: Yes  Drive line dressing changed?: No    PHYSICAL EXAM:  Visit Vitals  BP (!) 80/0   Pulse 73   Temp 97.8 °F (36.6 °C)   Resp 18   Ht 5' 7\" (1.702 m)   Wt 261 lb 0.4 oz (118.4 kg)   SpO2 100%   BMI 40.88 kg/m²     General: Patient is well developed, more alert. Frequent tremors   HEENT: Normocephalic and atraumatic. No scleral icterus. Pupils are equal, round and reactive to light and accomodation. No conjunctival injection. Oropharynx is clear.  Flushed cheeks. Neck: Supple. No evidence of thyroid enlargements or lymphadenopathy. JVD: Cannot be appreciated   Lungs: Breath sounds are equal and clear bilaterally. + wheezing. Heart: Regular rate and rhythm with normal S1 and S2. No murmurs, gallops or rubs. Abdomen: Soft, no mass or tenderness. No organomegaly or hernia. Bowel sounds present. Genitourinary and rectal: deferred  Extremities: BLE + venous stasis   Neurologic: Generalized myoclonal jerking, intentional tremors, +asterixis   Psychiatric: More alert today  Skin: Warm, dry and well perfused. No lesions, nodules or rashes are noted. REVIEW OF SYSTEMS:  General: Denies fever, night sweats. Ear, nose and throat: Denies difficulty hearing, sinus problems, runny nose, post-nasal drip, ringing in ears, mouth sores, loose teeth, ear pain, nosebleeds, sore throate, facial pain or numbess  Cardiovascular: see above in the interval history  Respiratory: Denies cough, wheezing, sputum production, hemoptysis. Gastrointestinal: Denies heartburn, constipation, intolerance to certain foods, diarrhea, abdominal pain, nausea, vomiting, difficulty swallowing, blood in stool  Kidney and bladder: Denies painful urination, frequent urination, urgency, prostate problems and impotence  Musculoskeletal: Denies joint pain, muscle weakness  Skin and hair: Denies change in existing skin lesions, hair loss or increase, breast changes    PAST MEDICAL HISTORY:  Past Medical History:   Diagnosis Date    Asthma     Cancer ([facility] [address]     breast    Cancer ([facility] [address]     endometrial    Congestive heart failure, unspecified     CRI (chronic renal insufficiency)     Depression     Diabetes ([facility] [address]     Hypertension        PAST SURGICAL HISTORY:  Past Surgical History:   Procedure Laterality Date    HX HERNIA REPAIR      HX HYSTERECTOMY      HX MASTECTOMY      IR INSERT NON TUNL CVC OVER 5 YRS  4/26/2021       FAMILY HISTORY:  No family history on file.     SOCIAL HISTORY:  Social History     Socioeconomic History    Marital status:      Spouse name: Not on file    Number of children: Not on file    Years of education: Not on file    Highest education level: Not on file   Tobacco Use    Smoking status: Never Smoker    Smokeless tobacco: Never Used   Substance and Sexual Activity    Alcohol use: Not Currently       LABORATORY RESULTS:     Labs Latest Ref Rng & Units 4/27/2021 4/27/2021 4/26/2021 4/26/2021 4/26/2021 4/25/2021 4/25/2021   WBC 3.6 - 11.0 K/uL - 3.4(L) - - 4.9 - 3. 5(L)   RBC 3.80 - 5.20 M/uL - 3.03(L) - - 3.13(L) - 2.63(L)   Hemoglobin 11.5 - 16.0 g/dL - 8. 0(L) - - 8. 1(L) 8.2(L) 6. 7(L)   Hematocrit 35.0 - 47.0 % - 27. 6(L) - - 27. 8(L) 27. 5(L) 23. 5(L)   MCV 80.0 - 99.0 FL - 91.1 - - 88.8 - 89.4   Platelets 346 - 320 K/uL - 125(L) - - 130(L) - 127(L)   Lymphocytes 12 - 49 % - 13 - - 12 - 16   Monocytes 5 - 13 % - 9 - - 8 - 10   Eosinophils 0 - 7 % - 0 - - 0 - 0   Basophils 0 - 1 % - 0 - - 0 - 0   Albumin 3.5 - 5.0 g/dL 3.6 3.4(L) - - - - 3.6   Calcium 8.5 - 10.1 MG/DL 8.9 9.0 9.2 9.0 - - 8. 2(L)   Glucose 65 - 100 mg/dL 101(H) 62(L) 86 108(H) - - 54(L)   BUN 6 - 20 MG/DL 56(H) 55(H) 77(H) 75(H) - - 66(H)   Creatinine 0.55 - 1.02 MG/DL 3.16(H) 3.03(H) 4.15(H) 4.29(H) - - 4.04(H)   Sodium 136 - 145 mmol/L 138 140 138 136 - - 138   Potassium 3.5 - 5.1 mmol/L 3.9 3.8 4.3 4.2 - - 3.9   TSH 0.36 - 3.74 uIU/mL - - - - - - -   LDH 81 - 246 U/L - 178 - - 185 - -   Some recent data might be hidden     Lab Results   Component Value Date/Time    TSH 5.36 (H) 04/24/2021 04:32 AM    TSH 3.99 (H) 04/17/2021 04:54 AM    TSH 2.980 10/01/2020 12:00 AM       ALLERGY:  Allergies   Allergen Reactions    Benzodiazepines Other (comments)     Respiratory issues        CURRENT MEDICATIONS:    Current Facility-Administered Medications:     warfarin (COUMADIN) tablet 4 mg, 4 mg, Oral, ONCE, Bjorn Meckel, MD    benzocaine-zinc cl-benzalkonium cl (ORAJEL) 20-0.1-0.02 % mucosal gel, , Oral, PRN, Signa Birch, MD    heparin (porcine) 1,000 unit/mL injection 1,100 Units, 1,100 Units, InterCATHeter, DIALYSIS PRN, 1,100 Units at 04/26/21 1941 **AND** heparin (porcine) 1,000 unit/mL injection 1,400 Units, 1,400 Units, InterCATHeter, DIALYSIS PRN, Wilfrido Perry MD, 1,400 Units at 04/26/21 1942    glucose chewable tablet 16 g, 4 Tab, Oral, PRN, Rajiv Macias MD    dextrose (D50W) injection syrg 12.5-25 g, 12.5-25 g, IntraVENous, PRN, Rajiv Macais MD, 25 g at 04/26/21 2246    glucagon (GLUCAGEN) injection 1 mg, 1 mg, IntraMUSCular, PRN, Rajiv Macias MD    0.9% sodium chloride infusion 250 mL, 250 mL, IntraVENous, PRN, Talha Maurice NP    budesonide-formoteroL (SYMBICORT) 160-4.5 mcg/actuation HFA inhaler 1 Puff (Patient Supplied), 1 Puff, Inhalation, BID RT, Rajiv Macias MD, 1 Puff at 04/26/21 1015    bumetanide (BUMEX) injection 2 mg, 2 mg, IntraVENous, TID, Hans Walter NP, 2 mg at 04/27/21 0948    potassium chloride SR (KLOR-CON 10) tablet 20 mEq, 20 mEq, Oral, DAILY, Mal Walter NP, 20 mEq at 04/27/21 1241    albuterol (PROVENTIL HFA, VENTOLIN HFA, PROAIR HFA) inhaler 2 Puff (Patient Supplied), 2 Puff, Inhalation, Q4H PRN, Rajiv Macias MD    epoetin amalia-epbx (RETACRIT) injection 20,000 Units, 20,000 Units, SubCUTAneous, Q MON, WED & FRI, Jaspreet Mendoza MD, 20,000 Units at 04/26/21 2310    oxymetazoline (AFRIN) 0.05 % nasal spray 2 Spray, 2 Spray, Both Nostrils, BID PRN, Hans Walter NP, 2 [place] at 04/22/21 1800    sodium chloride (OCEAN) 0.65 % nasal squeeze bottle 2 Spray, 2 Spray, Both Nostrils, Q2H PRN, Hans Walter NP    therapeutic multivitamin (THERAGRAN) tablet 1 Tab, 1 Tab, Oral, DAILY, Mal Walter NP, 1 Tab at 04/27/21 1241    polyethylene glycol (MIRALAX) packet 17 g, 17 g, Oral, DAILY, Mal Walter NP, 17 g at 04/27/21 1246    isosorbide mononitrate ER (IMDUR) tablet 30 mg, 30 mg, Oral, DAILY, Arnaldo Boyd NP, Stopped at 04/27/21 0900    cephALEXin (KEFLEX) capsule 250 mg, 250 mg, Oral, BID, Jennifer Wu MD, 250 mg at 04/27/21 1239    sodium chloride (NS) flush 5-40 mL, 5-40 mL, IntraVENous, Q8H, Polliard, Verta Oriental orthodox T, NP, 10 mL at 04/27/21 0600    sodium chloride (NS) flush 5-40 mL, 5-40 mL, IntraVENous, PRN, Polliard, Verta Oriental orthodox T, NP, 10 mL at 04/22/21 1238    acetaminophen (TYLENOL) tablet 650 mg, 650 mg, Oral, Q4H PRN, Polliard, Dana T, NP, 650 mg at 04/18/21 0807    hydrALAZINE (APRESOLINE) 20 mg/mL injection 10 mg, 10 mg, IntraVENous, Q4H PRN, Polliard, Verta Oriental orthodox T, NP, 10 mg at 04/21/21 6494    hydrALAZINE (APRESOLINE) tablet 100 mg, 100 mg, Oral, TID, Livia Walter NP, Stopped at 04/26/21 1743    hydrOXYzine HCL (ATARAX) tablet 10 mg, 10 mg, Oral, TID PRN, Livia Walter NP    insulin glargine (LANTUS) injection 20 Units, 20 Units, SubCUTAneous, BID, Livia Walter NP, 20 Units at 04/27/21 0954    PARoxetine (PAXIL) tablet 20 mg, 20 mg, Oral, DAILY, Jose Angel Verparish Oriental orthodox MECHE NP, 20 mg at 04/27/21 1240    ranolazine ER (RANEXA) tablet 1,000 mg, 1,000 mg, Oral, BID, Livia Walter NP, Stopped at 04/27/21 0900    tafamidis cap 61 mg (Patient Supplied), 61 mg, Oral, DAILY, Livia Walter NP, 61 mg at 04/27/21 1242    warfarin dosing per pharmacy, , Other, Rx Dosing/Monitoring, Livia Walter NP    miconazole (MICOTIN) 2 % powder, , Topical, BID, Jennifer Wu MD, Given at 04/27/21 8107    PATIENT CARE TEAM:  Patient Care Team:  Misty Yao NP as PCP - General (Nurse Practitioner)  Ragini Hsu MD (Cardiology)  Ines Diaz MD as Physician (Cardiology)     Thank you for allowing me to participate in this patient's care. Saintclair Sos, NP  [address] [place] [address], [address]  Phone: [phone]  Fax: [phone]    [facility] ATTENDING ADDENDUM    Patient was seen and examined in person. Data and notes were reviewed. I have discussed and agree with the plan as noted in the NP note above without further additions.     Norbert Cooks, MD PhD  [facility] [place]

## 2021-04-27 NOTE — PROGRESS NOTES
0730:  Bedside shift change report given to Dianna Ramirez RN (oncoming nurse) by Ivone Fernandez RN (offgoing nurse). Report included the following information SBAR, Kardex, Procedure Summary, Intake/Output, MAR and Recent Results. [id number]:  Patient sitting up in chair. 1430:  Dialysis at bedside. 1845:  Dialysis complete, patient tolerated well.    1930:  Bedside shift change report given to Ivone Fernandez RN (oncoming nurse) by Dianna Ramirez RN (offgoing nurse). Report included the following information SBAR, Kardex, Procedure Summary, Intake/Output, MAR and Recent Results. Problem: Falls - Risk of  Goal: *Absence of Falls  Description: Document Steven Vasquez Fall Risk and appropriate interventions in the flowsheet. Outcome: Progressing Towards Goal  Note: Fall Risk Interventions:  Mobility Interventions: Bed/chair exit alarm, Patient to call before getting OOB         Medication Interventions: Teach patient to arise slowly, Utilize gait belt for transfers/ambulation, Patient to call before getting OOB, Bed/chair exit alarm    Elimination Interventions: Call light in reach, Bed/chair exit alarm, Patient to call for help with toileting needs    History of Falls Interventions: Bed/chair exit alarm, Consult care management for discharge planning, Door open when patient unattended, Investigate reason for fall, Evaluate medications/consider consulting pharmacy, Room close to nurse's station, Utilize gait belt for transfer/ambulation, Assess for delayed presentation/identification of injury for 48 hrs (comment for end date), Vital signs minimum Q4HRs X 24 hrs (comment for end date)         Problem: Pressure Injury - Risk of  Goal: *Prevention of pressure injury  Description: Document Jaime Scale and appropriate interventions in the flowsheet.   Outcome: Progressing Towards Goal  Note: Pressure Injury Interventions:  Sensory Interventions: Assess changes in LOC    Moisture Interventions: Apply protective barrier, creams and emollients, Assess need for specialty bed    Activity Interventions: Increase time out of bed, Pressure redistribution bed/mattress(bed type)    Mobility Interventions: Float heels, Pressure redistribution bed/mattress (bed type), PT/OT evaluation    Nutrition Interventions: Document food/fluid/supplement intake, Discuss nutritional consult with provider, Offer support with meals,snacks and hydration    Friction and Shear Interventions: Lift sheet, HOB 30 degrees or less

## 2021-04-27 NOTE — PROGRESS NOTES
1930: Bedside and Verbal shift change report given to Gil Llanos RN (oncoming nurse) by Anna Lopez RN (offgoing nurse). Report included the following information SBAR, Kardex, Intake/Output, MAR, Recent Results and Cardiac Rhythm Paced. 2220: HYPOGLYCEMIC EPISODE DOCUMENTATION Patient with hypoglycemic episode(s) at 2220(time) on 4/26/21(date). BG value(s) pre-treatment 68 Was patient symptomatic? unknown Patient was treated with the following rescue medications/treatments: [x] D50 [] Glucose tablets 
              [] Glucagon 
              [] 4oz juice 
              [] 6oz reg soda 
              [] 8oz low fat milk BG value post-treatment: 110 
 
2238: Patient is very lethargic and only responding to pain. O2 Sats in the 80's. Placed patient on Trilogy respiratory device. 2251: Winifred Chicas NP regarding patient's condition. No new orders at this time. Will continue to monitor. 0730: Bedside and Verbal shift change report given to Matilda Field RN (oncoming nurse) by Gil Llanos RN (offgoing nurse). Report included the following information SBAR, Kardex, Intake/Output, MAR, Recent Results and Cardiac Rhythm Paced.

## 2021-04-27 NOTE — PROGRESS NOTES
Pharmacist Note  Warfarin Dosing Consult provided for this 78 y. o.female to manage warfarin for LVAD INR Goal: 1.8-2.5 per NP (lowered due to GIB) Home regimen/ tablet size: 4 mg daily per most recent prescription Drugs that may increase INR: None Drugs that may decrease INR: None Other current anticoagulants/ drugs that may increase bleeding risk: None Risk factors: Age > 72 Daily INR ordered: YES Recent Labs  
  04/27/21 
0517 04/26/21 
0245 04/25/21 
1752 04/25/21 
0343 HGB 8.0* 8.1* 8.2* 6.7* INR 1.8* 1.8*  --  2.5* Date               INR                  Dose 4/16  1.6  4 mg 4/17  1.8  4 mg 4/18  1.8  4 mg 4/19  1.8  4 mg  
4/20                 2.0                  4 mg 
4/21                 2.1                  4 mg 
4/22                 2.2                  4 mg 
4/23                 2.6                  2.5 mg 
4/24  2.7  2.5 mg 
4/25  2.5  2.5 mg 
 4/26               1.8                   4 mg   
4/27                1.8                   4 mg Assessment/ Plan: INR 1.8. Will order warfarin 4 mg PO x 1 dose today. Pharmacy will continue to monitor daily and adjust therapy as indicated. Please contact the pharmacist at  for outpatient recommendations if needed.

## 2021-04-27 NOTE — PROCEDURES
[facility]  [phone]    Vitals   Pre   Post   Assessment   Pre   Post     Temp  Temp: 98.2 °F (36.8 °C) (21 1500)  98.4 LOC  Alert oriented to name and  Alert daughter at bedside assisting with dinner   HR   Pulse (Heart Rate): 79 (21 1500) 82 Lungs   Diminished labored with use of abdominal accessory muscles oxygen via nasal cannula  diminished   B/P   BP: (84 doppler ) (21 1500) 88 doppler LVAD Cardiac   LVAD bedside telemetry   LVAD bedside telemetry    Resp   Resp Rate: 20 (21 1500) 27 Skin   Warm and dry      Pain level  Pain Intensity 1: 0 (21 1151) 0 Edema    Abdominal and BLE   Abdominal BLE   Orders:    Duration:   Start:    1500 End:    1800 Total:   3   Dialyzer:   Dialyzer/Set Up Inspection: Revaclear (21 1500)   K Bath:   Dialysate K (mEq/L): 3 (21 1500)   Ca Bath:   Dialysate CA (mEq/L): 2.5 (21 1500)   Na/Bicarb:   Dialysate NA (mEq/L): 140 (21 1500)   Target Fluid Removal:   Goal/Amount of Fluid to Remove (mL): 1000 mL (21)   Access     Type & Location:   Right IJ, dressing changed with primary nurse and dated, Each catheter limb disinfected per p&p, caps removed, hubs disinfected per p&p, aspirated 5 ml lab drawn and flushed.        Labs     Obtained/Reviewed   Critical Results Called   Date when labs were drawn-  Hgb-    HGB   Date Value Ref Range Status   2021 8.0 (L) 11.5 - 16.0 g/dL Final     K-    Potassium   Date Value Ref Range Status   2021 3.9 3.5 - 5.1 mmol/L Final     Ca-   Calcium   Date Value Ref Range Status   2021 8.9 8.5 - 10.1 MG/DL Final     Bun-   BUN   Date Value Ref Range Status   2021 56 (H) 6 - 20 MG/DL Final     Creat-   Creatinine   Date Value Ref Range Status   2021 3.16 (H) 0.55 - 1.02 MG/DL Final        Medications/ Blood Products Given     Name   Dose   Route and Time     heparin 1:1000 1.1 arterial and 1.4 venous             Blood Volume Processed (BVP):    50.8 Net Fluid   Removed:  1000   Comments   Time Out Done:  2864  Primary Nurse Rpt Pre: Kath White RN   Primary Nurse Rpt Post: Kath White RN   Pt Education: procedural 2nd dialysis   Care Plan: continue current HD  Plan of care   Tx Summary:  1500 daughter at bedside, time given for patient and/or daughter to ask any questions this is her second HD tx, noted tremor, HD initiated as ordered. 1800 treatment completed, Each dialysis catheter limb disinfected per p&p, blood returned per p&p, each dialysis hub disinfected per p&p, post dialysis catheter dwell instilled per order, and caps applied. All dialysis related medications have been reviewed. Admiting Diagnosis: heart failure   Pt's previous clinic- n/a  Consent signed - Informed Consent Verified: Yes (04/27/21 1500)  Hepatitis Status- 04/26/21 antigen negative, antibodies drawn  Machine #- Machine Number: [id number] (04/27/21 1500)  Telemetry status- bedside  Pre-dialysis wt. -  n/a

## 2021-04-27 NOTE — PROGRESS NOTES
Comprehensive Nutrition Assessment Type and Reason for Visit: Mer Camarena Nutrition Recommendations/Plan: 1. Continue to encourage high protein foods with all meals 2. Add daily MVI for wound healing 3. Use Orajel PRN for mouth sores from [facility] Nutrition Assessment:   
Pt admitted for dypsnea. PMHx: heart failure s/p LVAD, COPD, CKD3, pulmonary HTN, Depression, HTN, and HLD. Advanced Heart Failure Team following. Diuretics in place with some improvement in LE edema. ROCIO on CKD due to CRS with 1st HD yesterday. Ammonia elevated today. WOCN following for multiple wound POA  Including chronic sternal wound (see below). Pt & daughter visited today. Intake good for most meals over past few days per daughter reports. Tray ordering has been an issue so orders taken and preferences updated (likes boiled eggs, raisin bran). Mouth sores from [facility] noted - orajel ordered. Briefly discussed high protein foods for while on HD which pt is aware of from previous discussions for wound healing. Pt and daughter with no questions at this time. Per wound care notes 4/23: 
1. POA Right medial lower leg wound - Full thickness 4. POA Stage 3 pressure injury to right buttock - healing Malnutrition Assessment: 
Malnutrition Status:  No malnutrition Nutritionally Significant Medications: bumex, retacrit, lantus (20 units), paxil, miralax, KCl, ranexa, coumadin MVI, bumex drip. Estimated Daily Nutrient Needs: 
Energy (kcal): 0095-4114GKDY; Weight Used for Energy Requirements: Current(118kg) Protein (g): 105-130g (20-25% est needs); Weight Used for Protein Requirements: Current(118kg) Fluid (ml/day): 2100 or per cardio; Method Used for Fluid Requirements: 1 ml/kcal 
 
Nutrition Related Findings:      
BM: 4/25 Edema: 1+ BLLE Wounds:  (See WOCN note) Current Nutrition Therapies:  
Diet: consistent CHO 2000kcal, TRISTEN Supplements: none Meal intake:  
Patient Vitals for the past 168 hrs: 
 % Diet Eaten  
04/27/21 1151 26 - 50% 04/27/21 0839 0  
04/25/21 0830 76 - 100% 04/24/21 1950 76 - 100% 04/24/21 1300 0  
04/24/21 0900 0  
04/23/21 0940 51 - 75% 04/22/21 1925 51 - 75% 04/21/21 1708 51 - 75% 04/20/21 1904 26 - 50% 04/20/21 1559 0  
04/20/21 1341 51 - 75% Anthropometric Measures: 
· Height:  5' 7\" (170.2 cm) · Current Body Wt:  118 kg (260 lb 2.3 oz) · Ideal Body Wt:  135:  192.7 % Wt Readings from Last 10 Encounters:  
04/27/21 118.4 kg (261 lb 0.4 oz) 04/15/21 122.9 kg (271 lb) 04/15/21 122.9 kg (271 lb)  
03/16/21 124.7 kg (275 lb)  
03/15/21 124.7 kg (275 lb) 02/09/21 127.9 kg (282 lb) 01/07/21 127.9 kg (282 lb) 12/08/20 128.8 kg (284 lb)  
11/20/20 128 kg (282 lb 3 oz) 11/08/20 90.7 kg (200 lb) Nutrition Diagnosis:  
· Increased nutrient needs related to renal dysfunction(wound healing) as evidenced by wounds, dialysis Nutrition Interventions:  
Food and/or Nutrient Delivery: Continue current diet Nutrition Education and Counseling: Education completed Coordination of Nutrition Care: Continue to monitor while inpatient, Interdisciplinary rounds Goals: 
Continued consumption of at least 75% meals; high protein food with each meal    
 
Nutrition Monitoring and Evaluation:  
Behavioral-Environmental Outcomes: None identified Food/Nutrient Intake Outcomes: Food and nutrient intake Physical Signs/Symptoms Outcomes: Weight, Fluid status or edema Discharge Planning: Too soon to determine Eric Mann, RD  [address] , Pager #[phone] or via [employer]

## 2021-04-27 NOTE — PROGRESS NOTES
Problem: Self Care Deficits Care Plan (Adult)  Goal: *Acute Goals and Plan of Care (Insert Text)  Description: FUNCTIONAL STATUS PRIOR TO ADMISSION: Patient was modified independent using a rollator for functional mobility. Pt lives alone, was able to perform dressing with AE (reacher.) She does not wear socks and has slip on shoes with pre tied laces. Pt was sponge bathing at home due to B LE wounds. Pt independently managed her LVAD    HOME SUPPORT PRIOR TO ADMISSION: The patient lived alone with daughter to provide assistance. Will be staying with daughter once discharged. Occupational Therapy Goals  Initiated 4/19/2021; Goals reviewed 4/26/2021, goals downgraded due to myoclonic jerking/tremors impacting performance. 1.  Patient will perform upper body dressing and lower body dressing using AE PRN with modified independence within 7 day(s). Downgrade to only UB dressing with min A  2. Patient will perform bathing with modified independence within 7 day(s). Downgrade to min A.   3.  Patient will perform standing ADLs at sink with modified independence within 7 day(s). Downgrade to EOB with CGA  4. Patient will perform toilet transfers in bathroom with least restrictive DME with modified independence within 7 day(s). Downgrade to [facility] t/f with min A  5. Patient will perform all aspects of toileting with modified independence within 7 day(s). Downgrade to min A.  6.  Patient will utilize energy conservation techniques during functional activities with verbal cues within 7 day(s). Continue. Outcome: Progressing Towards Goal   OCCUPATIONAL THERAPY TREATMENT  Patient: Roxanne Orta (89 y.o. female)  Date: 4/27/2021  Diagnosis: Dyspnea [R06.00] <principal problem not specified>  Procedure(s) (LRB):  RIGHT HEART CATH (N/A) 7 Days Post-Op  Precautions: (LVAD)  Chart, occupational therapy assessment, plan of care, and goals were reviewed.     ASSESSMENT  Patient continues with skilled OT services and is progressing towards goals. Patient continues to be limited by jerking movements of BUE however noted patient with increased alertness/orientation and with fewer jerks (per daughter). Patient required largely CGA for grooming tasks seated EOB due to unsteady sitting balance, generalized weakness, decreased endurance, and decreased activity tolerance. Patient required MOD A x 2 for all functional transfers today. Continue to recommend SNF rehab post discharge to maximize patient safety and independence with ADL transfers and tasks. Current Level of Function Impacting Discharge (ADLs): up to TOTAL A LB ADLs; MOD A x 2 functional transfers with high guard (could be higher levels of assist depending on amount of tremors patient is having)    Other factors to consider for discharge: supportive daughter; LVAD HM II; O2 needs         PLAN :  Patient continues to benefit from skilled intervention to address the above impairments. Continue treatment per established plan of care to address goals. Recommend with staff: OOB x 3 to chair if tolerated    Recommend next OT session: LB ADL training    Recommendation for discharge: (in order for the patient to meet his/her long term goals)  Therapy up to 5 days/week in SNF setting    This discharge recommendation:  Has not yet been discussed the attending provider and/or case management    IF patient discharges home will need the following DME: TBD       SUBJECTIVE:   Patient stated I want to get this blood out from under my finger nails (patient confused yesterday and scratching around IV insertion sites).     OBJECTIVE DATA SUMMARY:   Cognitive/Behavioral Status:  Neurologic State: Alert  Orientation Level: Oriented X4(intermittent confusion)  Cognition: Decreased attention/concentration  Perception: Appears intact  Perseveration: No perseveration noted  Safety/Judgement: Decreased insight into deficits; Decreased awareness of need for safety    Functional Mobility and Transfers for ADLs:  Bed Mobility:  Sit to Supine: Moderate assistance;Assist x2; Additional time    Transfers:  Sit to Stand: Moderate assistance;Assist x2; Additional time     Bed to Chair: Moderate assistance;Assist x2; Additional time    Balance:  Sitting: Impaired; Without support  Sitting - Static: Fair (occasional)  Sitting - Dynamic: Poor (constant support)  Standing: Impaired; Without support  Standing - Static: Poor;Constant support  Standing - Dynamic : Poor;Constant support    ADL Intervention:       Grooming  Position Performed: Seated edge of bed  Brushing Teeth: Contact guard assistance  Cues: Verbal cues provided; Tactile cues provided     Completed nail care seated in recliner using a antibacterial wipe with MIN A (following SPT to chair with MOD A x 2). Lower Body Dressing Assistance  Socks: Total assistance (dependent)         Cognitive Retraining  Safety/Judgement: Decreased insight into deficits; Decreased awareness of need for safety    Pain:  No complaints. Activity Tolerance:   Fair, requires rest breaks, and observed SOB with activity    After treatment patient left in no apparent distress:   Sitting in chair, Call bell within reach, Bed / chair alarm activated, and Caregiver / family present    COMMUNICATION/COLLABORATION:   The patients plan of care was discussed with: Physical therapist and Registered nurse.      Mirtha Gamino  Time Calculation: 25 mins

## 2021-04-28 NOTE — PROGRESS NOTES
[address] [facility] Adult  Hospitalist Group Hospitalist Progress Note Clifton Ramey MD 
Answering service: [phone] from in house phone NAME:  Keshav Estrada :  1950 MRN:  [id number] Admission Summary:  
Keshav Estrada is a 78 y.o. female with a past medical history of heart failure s/p LVAD, COPD, CKD stage 3, pulmonary HTN, Depression, HTN, and HLD who presented to [facility] appointment with complaints of chest tightness and increasing dyspnea. She was instructed to come to [facility] [place] for admission for further treatment. At time of examination patient experiencing dyspnea but states she's feeling 100% better than what she felt yesterday. Interval history / Subjective:  
 
Patient seen and examined Heart failure team adjusting meds Creatinine elevated nephrology managing dialysis Severe tremors improved with dialysis Assessment & Plan:  
 
 Acute on Chronic systolic congestive heart failure , s/p LVAD 
- NYHA class IV ;  s/p LVAD heartMate 2 , increased speed per HF team  
- Advanced heart failure team following and adjusting meds , anticoagulation with warfarin [facility]-  to measure heart pressures  
  
COPD-  Stopped nebs- resume home inhalers 
symbicort and albuterol Severe tremors- unclear etiology- stopped nebs and thyroid med Improved after blood transfusion and dialysis,  
neurology eval completed- neurontin discontinued Subclinical hypothyroidism- normal free T4 with minimally elevated TSH Do not recommend any treatment or thyroid meds for these labs- DC synthroid Constipation with KUB suggesting fecal impaction- resolved after suppository CKD stage 3 Creatinine elevated due to diuresis Nephrology consulted and managing Adjusting bumex doses/regimen 
starting dialysis 21 
  
DM  
- A1c 7.2  
- Monitor BG AC/HS  
- Continue lantus , accuchecks and ss insulin 
  
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  
  
JED  
-  home trilogy, BiPAP inpatient if patient able to tolerate this Depression - cont Paroxetine H/o breast cancer (1992) · s/p bilateral mastectomy/chemo and endometrial cancer s/p hysterectomy Pancytopenia- stable but persists Etiology unknown at this point Hematology consulted for eval and tx 
 transfused PRBC this admission Code status: full DVT prophylaxis: warfarin- therapeutic INR Care Plan discussed with: Patient/Family Anticipated Disposition: Home w/Family Anticipated Discharge: 24 hours to 48 hours Hospital Problems  Date Reviewed: 4/25/2021 Codes Class Noted POA Dyspnea ICD-10-CM: R06.00 
ICD-9-CM: 786.09  4/16/2021 Yes [facility] ICD-10-CM: I65.2 ICD-9-CM: 286.9  11/10/2020 Yes Open wound of left lower leg ICD-10-CM: O56.379L ICD-9-CM: 891.0  11/10/2020 Yes Anemia ICD-10-CM: D64.9 ICD-9-CM: 285.9  11/9/2020 Yes NICM (nonischemic cardiomyopathy) ([facility] ICD-10-CM: I42.8 ICD-9-CM: 425.4  11/4/2020 Yes Coronary artery disease involving native coronary artery of native heart without angina pectoris ICD-10-CM: I25.10 ICD-9-CM: 414.01  11/4/2020 Yes Chronic venous insufficiency ICD-10-CM: I87.2 ICD-9-CM: 459.81  11/4/2020 Yes Acute on chronic systolic CHF (congestive heart failure) (HCC) ICD-10-CM: L32.80 ICD-9-CM: 428.23, 428.0  10/27/2020 Yes Obesity, morbid ([facility] ICD-10-CM: E66.01 
ICD-9-CM: 278.01  10/1/2020 Yes Review of Systems: A comprehensive review of systems was negative except for that written in the HPI. Vital Signs:  
 Last 24hrs VS reviewed since prior progress note. Most recent are: 
Visit Vitals BP (!) 80/0 Pulse 78 Temp 98.1 °F (36.7 °C) Resp 24 Ht 5' 7\" (1.702 m) Wt 118.4 kg (261 lb 0.4 oz) SpO2 98% BMI 40.88 kg/m² Patient Vitals for the past 24 hrs: 
 Temp Pulse Resp SpO2  
04/27/21 2231  78    
04/27/21 2102    98 % 04/27/21 2051 98.1 °F (36.7 °C) 78 24 96 % 04/27/21 1800 98.4 °F (36.9 °C) 82 22   
04/27/21 1745  72 30   
04/27/21 1730  73 24   
04/27/21 1715  77 26   
04/27/21 1700  84 24   
04/27/21 1645  70 30   
04/27/21 1630  77 27   
04/27/21 1615  (!) 111 23   
04/27/21 1600  78 20   
04/27/21 1545  86 17   
04/27/21 1530  67 21   
04/27/21 1515  (!) 59 20   
04/27/21 1500 98.2 °F (36.8 °C) 79 20 96 % 04/27/21 1151 97.8 °F (36.6 °C) 73 18 100 % 04/27/21 0839 97.8 °F (36.6 °C) 71 16 100 % 04/27/21 0439 97.6 °F (36.4 °C) 74 18 93 % Intake/Output Summary (Last 24 hours) at 4/27/2021 2317 Last data filed at 4/27/2021 2051 Gross per 24 hour Intake 580 ml Output 1500 ml Net -920 ml Physical Examination:  
     
Constitutional: NAD, awake and alert ENT:  Oral mucosa moist, Resp: Decreased breath sounds, no wheezing CV:  audible LVAD motor GI:  Soft, non distended, non tender. normoactive bowel sounds, no hepatosplenomegaly Musculoskeletal:  mild-mod edema, warm, wounds on bilat LE Neurologic:  Moves all extremities. AAOx3, CN II-XII reviewed, diffuse mild tremors in upper and lower extremities. Data Review:  
 Review and/or order of clinical lab test 
XR CHEST PORT 
  
INDICATION: Possible aspiration 
  
COMPARISON: 4/26/2021 at 1406 hours 
  
TECHNIQUE: Portable AP upright chest view at 0950 hours 
  
FINDINGS: The support devices are stable. There is stable cardiac silhouette 
enlargement. The pulmonary vasculature is within normal limits.  
  
The lungs and pleural spaces are clear. There is no pneumothorax. The bones and 
upper abdomen are stable. 
  
IMPRESSION 
  
Clear lungs. Stable cardiac silhouette enlargement. 
  
 
Labs:  
 
Recent Labs  
  04/27/21 
0517 04/26/21 
0245 WBC 3.4* 4.9 HGB 8. 0* 8.1* HCT 27.6* 27.8*  
* 130* Recent Labs  
  04/27/21 
0944 04/27/21 
0517 04/26/21 
1711 04/26/21 
0249 04/26/21 0245  140 138 136  --   
K 3.9 3.8 4.3 4.2  --   
 104 99 99  --   
CO2 33* 30 32 32  --   
BUN 56* 55* 77* 75*  --   
CREA 3.16* 3.03* 4.15* 4.29*  --   
* 62* 86 108*  --   
CA 8.9 9.0 9.2 9.0  --   
MG  --  2.6* 2.7* 2.6*  --   
PHOS 3.5 3.5  --   --  4.2 Recent Labs  
  04/27/21 
0944 04/27/21 
0517 04/25/21 
0343 04/25/21 
0342 ALT  --  10*  --  8* AP  --  47  --  43* TBILI  --  0.7  --  0.6 TP  --  7.6  --  7.3 ALB 3.6 3.4* 3.6 3.5 GLOB  --  4.2*  --  3.8 Recent Labs  
  04/27/21 0517 04/26/21 0245 04/25/21 0343 INR 1.8* 1.8* 2.5* PTP 18.1* 18.3* 24.9* Recent Labs  
  04/27/21 
4551 FERR 150 Lab Results Component Value Date/Time Folate 10.0 10/28/2020 03:56 AM  
  
No results for input(s): PH, PCO2, PO2 in the last 72 hours. No results for input(s): CPK, CKNDX, TROIQ in the last 72 hours. No lab exists for component: CPKMB Lab Results Component Value Date/Time Cholesterol, total 129 04/16/2021 10:40 AM  
 HDL Cholesterol 33 04/16/2021 10:40 AM  
 LDL, calculated 74 04/16/2021 10:40 AM  
 Triglyceride 110 04/16/2021 10:40 AM  
 CHOL/HDL Ratio 3.9 04/16/2021 10:40 AM  
 
Lab Results Component Value Date/Time Glucose (POC) 102 (H) 04/27/2021 09:24 PM  
 Glucose (POC) 100 04/27/2021 04:47 PM  
 Glucose (POC) 96 04/27/2021 11:37 AM  
 Glucose (POC) 72 04/27/2021 08:42 AM  
 Glucose (POC) 65 04/27/2021 08:17 AM  
 
Lab Results Component Value Date/Time  Color YELLOW/STRAW 04/18/2021 05:08 PM  
 Appearance CLEAR 04/18/2021 05:08 PM  
 Specific gravity 1.016 04/18/2021 05:08 PM  
 Specific gravity 1.013 11/09/2020 12:05 AM  
 pH (UA) 6.5 04/18/2021 05:08 PM  
 Protein 30 (A) 04/18/2021 05:08 PM  
 Glucose Negative 04/18/2021 05:08 PM  
 Ketone Negative 04/18/2021 05:08 PM  
 Bilirubin Negative 04/18/2021 05:08 PM  
 Urobilinogen 0.2 04/18/2021 05:08 PM  
 Nitrites Negative 04/18/2021 05:08 PM  
 Leukocyte Esterase SMALL (A) 04/18/2021 05:08 PM  
 Epithelial cells FEW 04/18/2021 05:08 PM  
 Bacteria Negative 04/18/2021 05:08 PM  
 WBC 0-4 04/18/2021 05:08 PM  
 RBC 0-5 04/18/2021 05:08 PM  
 
 
 
Medications Reviewed:  
 
Current Facility-Administered Medications Medication Dose Route Frequency  benzocaine-zinc cl-benzalkonium cl (ORAJEL) 20-0.1-0.02 % mucosal gel   Oral PRN  
 lactulose (CHRONULAC) 10 gram/15 mL solution 30 mL  20 g Oral DAILY  heparin (porcine) 1,000 unit/mL injection 1,100 Units  1,100 Units InterCATHeter DIALYSIS PRN And  
 heparin (porcine) 1,000 unit/mL injection 1,400 Units  1,400 Units InterCATHeter DIALYSIS PRN  
 glucose chewable tablet 16 g  4 Tab Oral PRN  
 dextrose (D50W) injection syrg 12.5-25 g  12.5-25 g IntraVENous PRN  
 glucagon (GLUCAGEN) injection 1 mg  1 mg IntraMUSCular PRN  
 0.9% sodium chloride infusion 250 mL  250 mL IntraVENous PRN  
 budesonide-formoteroL (SYMBICORT) 160-4.5 mcg/actuation HFA inhaler 1 Puff (Patient Supplied)  1 Puff Inhalation BID RT  
 bumetanide (BUMEX) injection 2 mg  2 mg IntraVENous TID  potassium chloride SR (KLOR-CON 10) tablet 20 mEq  20 mEq Oral DAILY  albuterol (PROVENTIL HFA, VENTOLIN HFA, PROAIR HFA) inhaler 2 Puff (Patient Supplied)  2 Puff Inhalation Q4H PRN  
 epoetin amalia-epbx (RETACRIT) injection 20,000 Units  20,000 Units SubCUTAneous Q MON, WED & FRI  
 oxymetazoline (AFRIN) 0.05 % nasal spray 2 Spray  2 Spray Both Nostrils BID PRN  
 sodium chloride (OCEAN) 0.65 % nasal squeeze bottle 2 Spray  2 Spray Both Nostrils Q2H PRN  therapeutic multivitamin (THERAGRAN) tablet 1 Tab  1 Tab Oral DAILY  polyethylene glycol (MIRALAX) packet 17 g  17 g Oral DAILY  isosorbide mononitrate ER (IMDUR) tablet 30 mg  30 mg Oral DAILY  cephALEXin (KEFLEX) capsule 250 mg  250 mg Oral BID  sodium chloride (NS) flush 5-40 mL  5-40 mL IntraVENous Q8H  
 sodium chloride (NS) flush 5-40 mL  5-40 mL IntraVENous PRN  
 acetaminophen (TYLENOL) tablet 650 mg  650 mg Oral Q4H PRN  
 hydrALAZINE (APRESOLINE) 20 mg/mL injection 10 mg  10 mg IntraVENous Q4H PRN  
 hydrALAZINE (APRESOLINE) tablet 100 mg  100 mg Oral TID  hydrOXYzine HCL (ATARAX) tablet 10 mg  10 mg Oral TID PRN  
 insulin glargine (LANTUS) injection 20 Units  20 Units SubCUTAneous BID  PARoxetine (PAXIL) tablet 20 mg  20 mg Oral DAILY  ranolazine ER (RANEXA) tablet 1,000 mg  1,000 mg Oral BID  tafamidis cap 61 mg (Patient Supplied)  61 mg Oral DAILY  warfarin dosing per pharmacy   Other Rx Dosing/Monitoring  miconazole (MICOTIN) 2 % powder   Topical BID  
 
______________________________________________________________________ EXPECTED LENGTH OF STAY: 5d 7h 
ACTUAL LENGTH OF STAY:          11 
 
            
Concepcion Crook MD

## 2021-04-28 NOTE — PROGRESS NOTES
[facility] VAD/Heart Failure Progress Note    Admit Date: 2021  POD:  8 Days Post-Op    Procedure:  Procedure(s):  RIGHT HEART CATH        Subjective:   Tremors improved with HD; still some itching      Objective:   Vitals:  Blood pressure (!) 80/0, pulse 75, temperature 98.1 °F (36.7 °C), resp. rate 17, height 5' 7\" (1.702 m), weight 260 lb (117.9 kg), SpO2 91 %. Temp (24hrs), Av.3 °F (36.8 °C), Min:97.5 °F (36.4 °C), Max:99.7 °F (37.6 °C)    Hemodynamics:   CO:     CI:     CVP:     SVR:     PAP Systolic:     PAP Diastolic:     PVR:     RR86:     SCV02:      VAD Interrogation: LVAD (Heartmate)  Pump Speed (RPM): 9600  Pump Flow (LPM): 5.5  Chatter in Lines: No  PI (Pulsitility Index): 4.8  Power: 6.2  MAP: 84   Test: Yes  Back Up  at Bedside & Labeled: Yes  Power Module Test: Yes  Driveline Site Care: No  Driveline Dressing: Clean, Dry, and Intact    EKG/Rhythm:      Extubation Date / Time:     CT Output:     Ventilator:  Ventilator Volumes  Vt Spont (ml): 435 ml (21)  Ve Observed (l/min): 6.7 l/min (21)    Oxygen Therapy:  Oxygen Therapy  O2 Sat (%): 91 % (21 0813)  Pulse via Oximetry: 75 beats per minute (21)  O2 Device: None (Room air) (21 08)  O2 Flow Rate (L/min): 2 l/min (21 0300)    CXR:    Admission Weight: Last Weight   Weight: 270 lb 6.4 oz (122.7 kg) Weight: 260 lb (117.9 kg)     Intake / Output / Drain:  Current Shift:  0701 -  1900  In: 240 [P.O.:240]  Out: 125 [Urine:125]  Last 24 hrs.:     Intake/Output Summary (Last 24 hours) at 2021 1054  Last data filed at 2021 0813  Gross per 24 hour   Intake 820 ml   Output 1250 ml   Net -430 ml           No results for input(s): CPK, CKMB, TROIQ in the last 72 hours.   Recent Labs     21  0258 21  2217 21  0944 21  0517 21  1711 21  0245 21  0245     --  138 140 138   < >  --    K 3.8 3.8 3.9 3.8 4.3   < >  --    CO2 30  --  33* 30 32   < >  --    BUN 33*  --  56* 55* 77*   < >  --    CREA 2.25*  --  3.16* 3.03* 4.15*   < >  --    GLU 79  --  101* 62* 86   < >  --    PHOS 2.2*  --  3.5 3.5  --   --  4.2   MG 2.3  --   --  2.6* 2.7*   < >  --    WBC 3.0*  --   --  3.4*  --   --  4.9   HGB 8.3*  --   --  8.0*  --   --  8.1*   HCT 28.5*  --   --  27.6*  --   --  27.8*   *  --   --  125*  --   --  130*    < > = values in this interval not displayed.      Recent Labs     04/28/21  0258 04/27/21  0517 04/26/21  0245   INR 2.1* 1.8* 1.8*   PTP 21.3* 18.1* 18.3*     No lab exists for component: PBNP        Current Facility-Administered Medications:     warfarin (COUMADIN) tablet 3 mg, 3 mg, Oral, ONCE, Cecilio Kong MD    benzocaine-zinc cl-benzalkonium cl (ORAJEL) 20-0.1-0.02 % mucosal gel, , Oral, PRN, Cecilio Kong MD, Given at 04/27/21 2211    lactulose (CHRONULAC) 10 gram/15 mL solution 30 mL, 20 g, Oral, DAILY, Keila Walter NP, 30 mL at 04/28/21 0838    heparin (porcine) 1,000 unit/mL injection 1,100 Units, 1,100 Units, InterCATHeter, DIALYSIS PRN, 1,100 Units at 04/27/21 1818 **AND** heparin (porcine) 1,000 unit/mL injection 1,400 Units, 1,400 Units, InterCATHeter, DIALYSIS PRN, Max Malone MD, 1,400 Units at 04/27/21 1818    glucose chewable tablet 16 g, 4 Tab, Oral, PRN, Cecilio Kong MD    dextrose (D50W) injection syrg 12.5-25 g, 12.5-25 g, IntraVENous, PRN, Cecilio Kong MD, 25 g at 04/26/21 2246    glucagon (GLUCAGEN) injection 1 mg, 1 mg, IntraMUSCular, PRN, Cecilio Kong MD    0.9% sodium chloride infusion 250 mL, 250 mL, IntraVENous, PRN, Johana Moody NP    budesonide-formoteroL (SYMBICORT) 160-4.5 mcg/actuation HFA inhaler 1 Puff (Patient Supplied), 1 Puff, Inhalation, BID RT, Cecilio Kong MD, 1 Puff at 04/28/21 5722    bumetanide (BUMEX) injection 2 mg, 2 mg, IntraVENous, TID, Anabella Walter NP, 2 mg at 04/28/21 6381   potassium chloride SR (KLOR-CON 10) tablet 20 mEq, 20 mEq, Oral, DAILY, Polliard, Ortega Croak T, NP, 20 mEq at 04/28/21 0836    albuterol (PROVENTIL HFA, VENTOLIN HFA, PROAIR HFA) inhaler 2 Puff (Patient Supplied), 2 Puff, Inhalation, Q4H PRN, Ann Mensah MD    epoetin amalia-epbx (RETACRIT) injection 20,000 Units, 20,000 Units, SubCUTAneous, Q MON, WED & Saad Baumann, Jaspreet ABBASI MD, 20,000 Units at 04/26/21 2310    oxymetazoline (AFRIN) 0.05 % nasal spray 2 Spray, 2 Spray, Both Nostrils, BID PRN, Ynes Walter NP, 2 Spray at 04/22/21 1800    sodium chloride (OCEAN) 0.65 % nasal squeeze bottle 2 Spray, 2 Spray, Both Nostrils, Q2H PRN, Ynes Walter NP    therapeutic multivitamin (THERAGRAN) tablet 1 Tab, 1 Tab, Oral, DAILY, Polliard, Ortega Croak T, NP, 1 Tab at 04/28/21 0836    polyethylene glycol (MIRALAX) packet 17 g, 17 g, Oral, DAILY, Polliard, Ortega Croak T, NP, 17 g at 04/28/21 0836    isosorbide mononitrate ER (IMDUR) tablet 30 mg, 30 mg, Oral, DAILY, Polliard, Ortega Croak T, NP, 30 mg at 04/28/21 0836    cephALEXin (KEFLEX) capsule 250 mg, 250 mg, Oral, BID, Carlos Waller MD, 250 mg at 04/28/21 0836    sodium chloride (NS) flush 5-40 mL, 5-40 mL, IntraVENous, Q8H, Dana Walter NP, 10 mL at 04/28/21 0641    sodium chloride (NS) flush 5-40 mL, 5-40 mL, IntraVENous, PRN, Polliard Ortega Croak T, NP, 10 mL at 04/22/21 1238    acetaminophen (TYLENOL) tablet 650 mg, 650 mg, Oral, Q4H PRN, Dana Walter NP, 650 mg at 04/28/21 0836    hydrALAZINE (APRESOLINE) 20 mg/mL injection 10 mg, 10 mg, IntraVENous, Q4H PRN, Ortega Walter NP, 10 mg at 04/21/21 0446    hydrALAZINE (APRESOLINE) tablet 100 mg, 100 mg, Oral, TID, Ortega Walter NP, 100 mg at 04/28/21 0836    hydrOXYzine HCL (ATARAX) tablet 10 mg, 10 mg, Oral, TID PRN, Ynes Walter NP    insulin glargine (LANTUS) injection 20 Units, 20 Units, SubCUTAneous, BID, Ynes Walter NP, 20 Units at 04/28/21 0800    PARoxetine (PAXIL) tablet 20 mg, 20 mg, Oral, DAILY, Polliard, Sawyman Santana T, NP, 20 mg at 04/28/21 7834    ranolazine ER (RANEXA) tablet 1,000 mg, 1,000 mg, Oral, BID, Polliard, Weyman Santana T, NP, 1,000 mg at 04/28/21 5416    tafamidis cap 61 mg (Patient Supplied), 61 mg, Oral, DAILY, Polliard, Weyman Santana T, NP, 61 mg at 04/28/21 0900    warfarin dosing per pharmacy, , Other, Rx Dosing/Monitoring, Wallace Headings, NP    miconazole (MICOTIN) 2 % powder, , Topical, BID, Danyelle Richey MD, Given at 04/27/21 1703    A/P     S/P LVAD - good flows  [facility]  Fluid overload - diuresis  Acute kidney injury - monitor      Risk of morbidity and mortality - high  Medical decision making - high complexity    Signed By: Levy Guevara MD

## 2021-04-28 NOTE — PROGRESS NOTES
Problem: Mobility Impaired (Adult and Pediatric)  Goal: *Acute Goals and Plan of Care (Insert Text)  Description: FUNCTIONAL STATUS PRIOR TO ADMISSION: Patient was modified independent using a rollator for functional mobility. HOME SUPPORT PRIOR TO ADMISSION: The patient lived alone with daughter to provide assistance. Will be staying with daughter once discharged. Physical Therapy Goals  Revised 4/26/2021  1. Patient will move from supine to sit and sit to supine , scoot up and down, and roll side to side in bed with minimal assistance/contact guard assist within 7 day(s). 2.  Patient will transfer from bed to chair and chair to bed with minimal assistance/contact guard assist using the least restrictive device within 7 day(s). 3.  Patient will perform sit to stand with minimal assistance/contact guard assist within 7 day(s). 4.  Patient will sit at EOB with supervision for balance engaged in task for 5 minutes within 7 day(s). Physical Therapy Goals  Initiated 4/17/2021  1. Patient will move from supine to sit and sit to supine  and scoot up and down in bed with modified independence within 7 day(s). 2.  Patient will transfer from bed to chair and chair to bed with modified independence using the least restrictive device within 7 day(s). 3.  Patient will perform sit to stand with modified independence within 7 day(s). 4.  Patient will ambulate with modified independence for 150 feet with the least restrictive device within 7 day(s). Outcome: Progressing Towards Goal    PHYSICAL THERAPY TREATMENT  Patient: Lang Andrews (80 y.o. female)  Date: 4/28/2021  Diagnosis: Dyspnea [R06.00] <principal problem not specified>  Procedure(s) (LRB):  RIGHT HEART CATH (N/A) 8 Days Post-Op  Precautions: (LVAD)  Chart, physical therapy assessment, plan of care and goals were reviewed. ASSESSMENT  Patient continues with skilled PT services and is progressing towards goals.  Pt reports feeling better. Decrease tremors per pt none noted during session. Pt was able to improve mobility and LE was able to support pt. Pt was able to ambulate a short distance but fatigued. Pt could benefit from inpt rehab but questionable pt's willingness to go. Pt lives alone and will need assistance if returning home. Will continue to progress as able. Current Level of Function Impacting Discharge (mobility/balance): CGA+Min A     Other factors to consider for discharge: decrease mobility          PLAN :  Patient continues to benefit from skilled intervention to address the above impairments. Continue treatment per established plan of care. to address goals. Recommendation for discharge: (in order for the patient to meet his/her long term goals)  Inpt rehab if declines then HHPT with assistance    This discharge recommendation:  Has not yet been discussed the attending provider and/or case management    IF patient discharges home will need the following DME: pt owns needed equipment, rollator has poor breaks. Will need to be OOP cost. Pt and pt's daughter aware and planning on purchasing        SUBJECTIVE:   Patient stated I can feel the power.  improve strength in LE    OBJECTIVE DATA SUMMARY:   Critical Behavior:  Neurologic State: Alert  Orientation Level: Oriented X4  Cognition: Appropriate decision making, Appropriate for age attention/concentration, Appropriate safety awareness  Safety/Judgement: Decreased insight into deficits, Decreased awareness of need for safety  Functional Mobility Training:  Bed Mobility:     Supine to Sit: Moderate assistance              Transfers:  Sit to Stand: Contact guard assistance;Minimum assistance;Assist x2  Stand to Sit: Contact guard assistance;Minimum assistance;Assist x2(decrease control)        Bed to Chair: Contact guard assistance;Minimum assistance;Assist x2(with rollator)      Bed to [facility]. Pt required assistance for self care.                Balance:  Sitting: Impaired  Sitting - Static: Good (unsupported)  Sitting - Dynamic: Fair (occasional)  Standing: Impaired  Standing - Static: Fair  Standing - Dynamic : Fair  Ambulation/Gait Training:  Distance (ft): 5 Feet (ft)  Assistive Device: Gait belt;Walker, rolling  Ambulation - Level of Assistance: Contact guard assistance;Minimal assistance;Assist x2        Gait Abnormalities: Decreased step clearance        Base of Support: Center of gravity altered; Widened        Step Length: Left shortened;Right shortened                    Stairs: Therapeutic Exercises:   Seated LE pt initiating   Pain Rating:  No complaints     Activity Tolerance:   observed SOB with activity    After treatment patient left in no apparent distress:   Sitting in chair, Call bell within reach, Bed / chair alarm activated, and Caregiver / family present    COMMUNICATION/COLLABORATION:   The patients plan of care was discussed with: Registered nurse.      Carlotta Garrison PTA   Time Calculation: 34 mins

## 2021-04-28 NOTE — DIALYSIS
[facility]  [phone]    Vitals   Pre   Post   Assessment   Pre   Post     Temp  Temp: 97.9 °F (36.6 °C) (04/28/21 1400)  97.9 LOC  Alert and oriented Alert and oriented   HR   Pulse (Heart Rate): 75 (04/28/21 1415) 74 Lungs   Unlabored at rest, room air, saturation 94%  unlabored at rest   B/P   BP: (doppler map 84) (04/28/21 1400) 86 doppler Cardiac   LVAD bedside telemetry   LVAD bedside telemetry   Resp   Resp Rate: 19 (04/28/21 1415) 22 Skin   Warm and dry   warm and dry    Pain level  Pain Intensity 1: 0 (04/28/21 1115) No verbal complaints Edema  BLE    BLE   Orders:    Duration:   Start:    1400 End:    1700 Total:   3   Dialyzer:   Dialyzer/Set Up Inspection: Violetta Randolph (04/28/21 1400)   K Bath:   Dialysate K (mEq/L): 3 (04/28/21 1400)   Ca Bath:   Dialysate CA (mEq/L): 2.5 (04/28/21 1400)   Na/Bicarb:   Dialysate NA (mEq/L): 140 (04/28/21 1400)   Target Fluid Removal:   Goal/Amount of Fluid to Remove (mL): 1000 mL (04/28/21 1400)   Access     Type & Location:   RIJ, dressing dated 04/27/21, clean, dry and intact, Each catheter limb disinfected per p&p, caps removed, hubs disinfected per p&p, aspirated 5 ml each limb, flushed lines reversed.        Labs     Obtained/Reviewed   Critical Results Called   Date when labs were drawn-  Hgb-    HGB   Date Value Ref Range Status   04/28/2021 8.3 (L) 11.5 - 16.0 g/dL Final     K-    Potassium   Date Value Ref Range Status   04/28/2021 3.8 3.5 - 5.1 mmol/L Final     Ca-   Calcium   Date Value Ref Range Status   04/28/2021 8.7 8.5 - 10.1 MG/DL Final     Bun-   BUN   Date Value Ref Range Status   04/28/2021 33 (H) 6 - 20 MG/DL Final     Comment:     INVESTIGATED PER DELTA CHECK PROTOCOL     Creat-   Creatinine   Date Value Ref Range Status   04/28/2021 2.25 (H) 0.55 - 1.02 MG/DL Final     Comment:     INVESTIGATED PER DELTA CHECK PROTOCOL        Medications/ Blood Products Given     Name   Dose   Route and Time     Heparin  1:1000 1.1 and 1.4 post tx              Blood Volume Processed (BVP):    65 Net Fluid   Removed:  1000   Comments   Time Out Done: 1350  Primary Nurse Rpt Pre: RYAN Camacho RN   Primary Nurse Rpt Post: Kath White RN   Pt Education: infection control  Care Plan: continue current HD plan of care   Tx Summary:  1400 HD initiated without incident  1700 treatment completed, Each dialysis catheter limb disinfected per p&p, blood returned per p&p, each dialysis hub disinfected per p&p, post dialysis catheter dwell instilled per order, and caps applied. All dialysis related medications have been reviewed. Admiting Diagnosis: LVAD, edema, hypoxia   Pt's previous clinic- n/a  Consent signed - Informed Consent Verified: Yes (04/28/21 1400)  Hepatitis Status- 04/21/21 antigen negative, 04/27/21 susceptible  Machine #- Machine Number: [id number] (04/28/21 1400)  Telemetry status- bedside   Pre-dialysis wt. -

## 2021-04-28 NOTE — PROGRESS NOTES
[facility] 
 [address] [address], [address] [facility] Phone: [phone]  
  
Nephrology Progress Note 
Ayanna De La O     1950     [id number] Date of Admission : 4/16/2021 04/28/21 CC: Follow up for ROCIO Assessment and Plan ROCIO on CKD: 
- likely 2/2 progressive CKD and CRS 
- dialysis initiated 4/26 
- HD #3 today then MWF afterwards 
- cont IV diuretics for now 
- not a great long-term HD candidate and she may not even tolerate HD well in the short term- discussed at length with daughter and she wishes to proceed with dialysis at this time Asterixis and encephalopathy 
- appreciate neurology 
- all offending meds off 
- improving with HD so far CKD stage III. - baseline around 2 
- presumed 2/2 DM, HTN. 
- Bone marrow Bx not done. - Renal US appearance of Kidneys not suggestive of Amyloidosis  
  
Chronic systolic CHF, stage C NYHA class IV 
-Combined ischemic and nonischemic cardiomyopathy 
-S/p HM 2 LVAD implant 4/15/2017 by Dr. Lupe Stallings at [facility] 
- hx of recurrent VT : off mexiletine - chronic RV failure. 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 Chronic anemia :  
+ve PYP testing.  
- anemia of chronic disease  
- continue HEIKE Leucopenia, Thrombocytopenia   
Severe COPD. Pulmonary hypertension. Chronic A. fib. History of endometrial Ca ATTR amyloidosis Interval History: 
Seen and examined. Daughter at bedside. Feeling better. Tremors improving. Less confused. Tolerating HD so far. No cp, sob, n/v/d. Review of Systems: A comprehensive review of systems was negative except for that written in the HPI. Current Medications:  
Current Facility-Administered Medications Medication Dose Route Frequency  warfarin (COUMADIN) tablet 3 mg  3 mg Oral ONCE  
 benzocaine-zinc cl-benzalkonium cl (ORAJEL) 20-0.1-0.02 % mucosal gel   Oral PRN  
 lactulose (CHRONULAC) 10 gram/15 mL solution 30 mL  20 g Oral DAILY  heparin (porcine) 1,000 unit/mL injection 1,100 Units  1,100 Units InterCATHeter DIALYSIS PRN And  
 heparin (porcine) 1,000 unit/mL injection 1,400 Units  1,400 Units InterCATHeter DIALYSIS PRN  
 glucose chewable tablet 16 g  4 Tab Oral PRN  
 dextrose (D50W) injection syrg 12.5-25 g  12.5-25 g IntraVENous PRN  
 glucagon (GLUCAGEN) injection 1 mg  1 mg IntraMUSCular PRN  
 0.9% sodium chloride infusion 250 mL  250 mL IntraVENous PRN  
 budesonide-formoteroL (SYMBICORT) 160-4.5 mcg/actuation HFA inhaler 1 Puff (Patient Supplied)  1 Puff Inhalation BID RT  
 bumetanide (BUMEX) injection 2 mg  2 mg IntraVENous TID  potassium chloride SR (KLOR-CON 10) tablet 20 mEq  20 mEq Oral DAILY  albuterol (PROVENTIL HFA, VENTOLIN HFA, PROAIR HFA) inhaler 2 Puff (Patient Supplied)  2 Puff Inhalation Q4H PRN  
 epoetin amalia-epbx (RETACRIT) injection 20,000 Units  20,000 Units SubCUTAneous Q MON, WED & FRI  
 oxymetazoline (AFRIN) 0.05 % nasal spray 2 Spray  2 Spray Both Nostrils BID PRN  
 sodium chloride (OCEAN) 0.65 % nasal squeeze bottle 2 Spray  2 Spray Both Nostrils Q2H PRN  therapeutic multivitamin (THERAGRAN) tablet 1 Tab  1 Tab Oral DAILY  polyethylene glycol (MIRALAX) packet 17 g  17 g Oral DAILY  isosorbide mononitrate ER (IMDUR) tablet 30 mg  30 mg Oral DAILY  cephALEXin (KEFLEX) capsule 250 mg  250 mg Oral BID  sodium chloride (NS) flush 5-40 mL  5-40 mL IntraVENous Q8H  
 sodium chloride (NS) flush 5-40 mL  5-40 mL IntraVENous PRN  
 acetaminophen (TYLENOL) tablet 650 mg  650 mg Oral Q4H PRN  
 hydrALAZINE (APRESOLINE) 20 mg/mL injection 10 mg  10 mg IntraVENous Q4H PRN  
 hydrALAZINE (APRESOLINE) tablet 100 mg  100 mg Oral TID  hydrOXYzine HCL (ATARAX) tablet 10 mg  10 mg Oral TID PRN  
 insulin glargine (LANTUS) injection 20 Units  20 Units SubCUTAneous BID  PARoxetine (PAXIL) tablet 20 mg  20 mg Oral DAILY  ranolazine ER (RANEXA) tablet 1,000 mg 1,000 mg Oral BID  tafamidis cap 61 mg (Patient Supplied)  61 mg Oral DAILY  warfarin dosing per pharmacy   Other Rx Dosing/Monitoring  miconazole (MICOTIN) 2 % powder   Topical BID Allergies Allergen Reactions  Benzodiazepines Other (comments) Respiratory issues Objective: 
Vitals:  
Vitals:  
 04/28/21 8398 04/28/21 0813 04/28/21 5220 04/28/21 1115 Pulse:  75  87 Resp:  17  21 Temp:  98.1 °F (36.7 °C)  98.1 °F (36.7 °C) TempSrc:      
SpO2:  91% Weight: 118 kg (260 lb 2.3 oz)  117.9 kg (260 lb) Height:   5' 7\" (1.702 m) Intake and Output: 
04/28 0701 - 04/28 1900 In: 240 [P.O.:240] Out: 125 [Urine:125] 04/26 1901 - 04/28 0700 In: 580 [P.O.:580] Out: 1825 [Urine:825] Physical Examination: 
General: Morbidly obese, in NAD Lucía Awe Neck: Supple,no mass palpable Lungs : CTA  
CVS: RRR, VAD sounds Abdomen: Soft, NT, BS +, obese Extremities: LE discoloration, LUE lymphedema Neurologic: Awake, alert, minimal tremors now Access: Manual Carol in place  : adelina 
  
 
[]    High complexity decision making was performed 
[]    Patient is at high-risk of decompensation with multiple organ involvement Lab Data Personally Reviewed: I have reviewed all the pertinent labs, microbiology data and radiology studies during assessment. Recent Labs  
  04/28/21 
0258 04/27/21 
2217 04/27/21 
7707 04/27/21 
1943 04/26/21 
1711 04/26/21 
0249 04/26/21 
0245 04/26/21 
0245   --  138 140 138 136  --   --   
K 3.8 3.8 3.9 3.8 4.3 4.2   < >  --   
  --  101 104 99 99  --   --   
CO2 30  --  33* 30 32 32  --   --   
GLU 79  --  101* 62* 86 108*  --   --   
BUN 33*  --  56* 55* 77* 75*  --   --   
CREA 2.25*  --  3.16* 3.03* 4.15* 4.29*  --   --   
CA 8.7  --  8.9 9.0 9.2 9.0  --   --   
MG 2.3  --   --  2.6* 2.7* 2.6*  --   --   
PHOS 2.2*  --  3.5 3.5  --   --   --  4.2 ALB 3.3*  --  3.6 3.4*  --   --   --   --   
ALT 9*  --   --  10*  --   -- --   --   
INR 2.1*  --   --  1.8*  --   --   --  1.8*  
 < > = values in this interval not displayed. Recent Labs  
  04/28/21 
0258 04/27/21 
0517 04/26/21 0245 04/25/21 
1752 WBC 3.0* 3.4* 4.9  --   
HGB 8.3* 8.0* 8.1* 8.2* HCT 28.5* 27.6* 27.8* 27.5*  
* 125* 130*  -- No results found for: SDES Lab Results Component Value Date/Time Culture result: NO GROWTH 5 DAYS 04/23/2021 06:51 PM  
 Culture result: NO GROWTH 5 DAYS 11/10/2020 06:42 PM  
 Culture result: No Growth (<1000 cfu/mL) 11/09/2020 12:05 AM  
 Culture result: SCANT Morganella morganii ssp morganii (A) 10/30/2020 11:30 AM  
 Culture result: SCANT Morganella morganii ssp morganii (A) 10/30/2020 11:30 AM  
 Culture result: RARE DIPHTHEROIDS (A) 10/30/2020 11:30 AM  
 
Recent Results (from the past 24 hour(s)) GLUCOSE, POC Collection Time: 04/27/21 11:37 AM  
Result Value Ref Range Glucose (POC) 96 65 - 100 mg/dL Performed by [facility] HEP B SURFACE AB Collection Time: 04/27/21  2:46 PM  
Result Value Ref Range Hepatitis B surface Ab <3.10 mIU/mL Hep B surface Ab Interp. NONREACTIVE NR    
GLUCOSE, POC Collection Time: 04/27/21  4:47 PM  
Result Value Ref Range Glucose (POC) 100 65 - 100 mg/dL Performed by Radha Marshall, POC Collection Time: 04/27/21  9:24 PM  
Result Value Ref Range Glucose (POC) 102 (H) 65 - 100 mg/dL Performed by Vamshi Galeano POTASSIUM Collection Time: 04/27/21 10:17 PM  
Result Value Ref Range Potassium 3.8 3.5 - 5.1 mmol/L PROTHROMBIN TIME + INR Collection Time: 04/28/21  2:58 AM  
Result Value Ref Range INR 2.1 (H) 0.9 - 1.1 Prothrombin time 21.3 (H) 9.0 - 11.1 sec NT-PRO BNP Collection Time: 04/28/21  2:58 AM  
Result Value Ref Range NT pro-BNP 13,142 (H) <125 PG/ML  
LD Collection Time: 04/28/21  2:58 AM  
Result Value Ref Range  81 - 246 U/L  
CBC WITH AUTOMATED DIFF  Collection Time: 04/28/21 2:58 AM  
Result Value Ref Range WBC 3.0 (L) 3.6 - 11.0 K/uL  
 RBC 3.15 (L) 3.80 - 5.20 M/uL HGB 8.3 (L) 11.5 - 16.0 g/dL HCT 28.5 (L) 35.0 - 47.0 % MCV 90.5 80.0 - 99.0 FL  
 MCH 26.3 26.0 - 34.0 PG  
 MCHC 29.1 (L) 30.0 - 36.5 g/dL  
 RDW 18.4 (H) 11.5 - 14.5 % PLATELET 327 (L) 743 - 400 K/uL MPV 9.5 8.9 - 12.9 FL  
 NRBC 0.0 0  WBC ABSOLUTE NRBC 0.00 0.00 - 0.01 K/uL NEUTROPHILS 76 (H) 32 - 75 % LYMPHOCYTES 14 12 - 49 % MONOCYTES 9 5 - 13 % EOSINOPHILS 0 0 - 7 % BASOPHILS 1 0 - 1 % IMMATURE GRANULOCYTES 0 0.0 - 0.5 % ABS. NEUTROPHILS 2.3 1.8 - 8.0 K/UL  
 ABS. LYMPHOCYTES 0.4 (L) 0.8 - 3.5 K/UL  
 ABS. MONOCYTES 0.3 0.0 - 1.0 K/UL  
 ABS. EOSINOPHILS 0.0 0.0 - 0.4 K/UL  
 ABS. BASOPHILS 0.0 0.0 - 0.1 K/UL  
 ABS. IMM. GRANS. 0.0 0.00 - 0.04 K/UL  
 DF SMEAR SCANNED    
 RBC COMMENTS ANISOCYTOSIS 1+ 
    
 RBC COMMENTS OVALOCYTES PRESENT 
    
PHOSPHORUS Collection Time: 04/28/21  2:58 AM  
Result Value Ref Range Phosphorus 2.2 (L) 2.6 - 4.7 MG/DL  
METABOLIC PANEL, COMPREHENSIVE Collection Time: 04/28/21  2:58 AM  
Result Value Ref Range Sodium 138 136 - 145 mmol/L Potassium 3.8 3.5 - 5.1 mmol/L Chloride 104 97 - 108 mmol/L  
 CO2 30 21 - 32 mmol/L Anion gap 4 (L) 5 - 15 mmol/L Glucose 79 65 - 100 mg/dL BUN 33 (H) 6 - 20 MG/DL Creatinine 2.25 (H) 0.55 - 1.02 MG/DL  
 BUN/Creatinine ratio 15 12 - 20 GFR est AA 26 (L) >60 ml/min/1.73m2 GFR est non-AA 22 (L) >60 ml/min/1.73m2 Calcium 8.7 8.5 - 10.1 MG/DL Bilirubin, total 0.6 0.2 - 1.0 MG/DL  
 ALT (SGPT) 9 (L) 12 - 78 U/L  
 AST (SGOT) 16 15 - 37 U/L Alk. phosphatase 52 45 - 117 U/L Protein, total 7.5 6.4 - 8.2 g/dL Albumin 3.3 (L) 3.5 - 5.0 g/dL Globulin 4.2 (H) 2.0 - 4.0 g/dL A-G Ratio 0.8 (L) 1.1 - 2.2 MAGNESIUM Collection Time: 04/28/21  2:58 AM  
Result Value Ref Range Magnesium 2.3 1.6 - 2.4 mg/dL AMMONIA  Collection Time: 04/28/21  3:00 AM  
Result Value Ref Range Ammonia 38 (H) <32 UMOL/L  
GLUCOSE, POC Collection Time: 04/28/21  6:37 AM  
Result Value Ref Range Glucose (POC) 79 65 - 100 mg/dL [facility]   
ECHO ADULT COMPLETE Collection Time: 04/28/21  9:14 AM  
Result Value Ref Range IVSd 0.82 0.60 - 0.90 cm LVIDd 7.37 (A) 3.90 - 5.30 cm LVIDs 6.88 cm  
 LVPWd 0.93 (A) 0.60 - 0.90 cm RVIDd 5.27 cm  
 RVSP 39.31 mmHg LA Volume 101.48 22.0 - 52.0 mL  
 LA Area 4C 27.11 cm2 LA Vol 2C 100.55 (A) 22.00 - 52.00 mL LA Vol 4C 90.67 (A) 22.00 - 52.00 mL Est. RA Pressure 8.00 mmHg MV A Hi 84.87 cm/s Mitral Valve E Wave Deceleration Time 252.57 ms  
 MV E Hi 83.96 cm/s Mitral Valve Pressure Half-time 73.24 ms  
 MVA (PHT) 3.00 cm2 Pulmonic Regurgitant End Max Velocity 129.88 cm/s Pulmonic Valve Systolic Peak Instantaneous Gradient 4.75 mmHg Tapse 1.44 (A) 1.50 - 2.00 cm Triscuspid Valve Regurgitation Peak Gradient 31.31 mmHg  
 TR Max Velocity 279.78 cm/s Ao Root D 3.24 cm  
 MV E/A 0.99 LV Mass .7 67.0 - 162.0 g  
 LV Mass AL Index 132.1 43.0 - 95.0 g/m2 LA Vol Index 44.89 16.00 - 28.00 ml/m2 LA Vol Index 44.48 16.00 - 28.00 ml/m2 LA Vol Index 40.11 16.00 - 28.00 ml/m2 I have reviewed the flowsheets. Chart and Pertinent Notes have been reviewed. No change in PMH ,family and social history from Consult note.  
 
 
Jann Moreno MD

## 2021-04-28 NOTE — PROGRESS NOTES
[facility] in [place], [place]  Inpatient Consult Progress Note      Patient name: Reji Ojeda  Patient : 1950  Patient MRN: [id number]  Consulting MD: Caroline Baugh MD  Date of service: 21    CHIEF COMPLAINT:  Dyspnea     PLAN:  · RHC  showed adequate Sal CI 3.0 but severely elevated RA pressure 24 mmHg  · Continue HD for volume management; per Nephrology- will need to determine longevity of HD therapy and plan for discharge   · ID consult for pancytopenia and persistent sed rate appreciated; ?alternative abx agent - recommend against stopping antibiotics altogether due to chronic SWI/suspected pump infection and high risk of clinical deterioration if infection is not suppressed  · Neurology consult appreciated for severe myoclonal jerking; suspect metabolic encephalopathy - possible offending agents held   · Palliative Care Consult to discuss goals and limits of care including HD - patient is a poor candidate for HD due to:   · End stage HF with LVAD and RV failure  · Chronic, active infection which would likely seed HD line  · Difficult disposition if patient were to require outpatient HD   · Discussed with daughter (Sarahi), Melinda; she and the patient understand concerns regarding patient's multiple co-morbidities, each with life limiting prognosis; they wish to pursue aggressive treatment including HD (if indicated) to prolong patient's life at home; goal is to spend time with her grandson, even if this means frequent trips to HD center.       Continue increased device speed to 9600rpm due to LVEDD up to 8.3cm  TTE  shows improved LVIDd, 6.68 cm with RVIDd 5.14 cm and TAPSE 1.1 cm   TTE 21 LVIDd 7.37cm, RVIDd 5.27cm, TAPSE 1.44cm   Previously was intolerant to higher speeds due to VT; observe closely   Volume management per Nephrology  Justine for strict I/O   Appreciate SLP consult and recommendations  CXR negative 21  Intolerant Spoke with patient 7/3/18. Patient's blood sugars were 148-253 mg/dl for the past 5 days. Instructed patient to increase Humulin R U-500 doses from 50 to 55. Instructed patient to call sooner if he has sugars below 100 mg/dl.     Birgit Sanders MS, RD, LD, CDE   to BB/ACEi/ARB/ARNI due to aTTR  Continue tafamidis 61 mg PO daily  Hydralazine 100 mg po TID  Continue Imdur 30 mg PO daily   Orthostatics today  Will request standing weights only   Intolerant of spironolactone due to hyperkalemia, renal failure   Coumadin dose per pharmacy; goal INR lowered to 1.8-2.5 due to GIB  Continue ranolazine, no ectopy - followed by Dr. 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 Liudmila Parker)  · C/b delayed extubation due to severe COPD  · C/b critical illness polyneuropathy  · C/b prolonged hospitalization post-LVAD, 55 days  · C/b sternal wound infection s/p debridement (by Dr. Bc Saldana) s/p wound vac  · C/b sacral ulcer  · Would culture positive for Staph aureus, not MRSA  · C/b LVAD site drainage, improved  S/p CRT-ICD  · ICD fired due to electrolyte imbalance (2011)  H/o breast cancer (1992)   · s/p bilateral mastectomy/chemo and endometrial cancer s/p hysterectomy  · Lymphedema of LLE due to cancer treatment  Severe COPD with FEV1 50%  Depression  Atrial fibrillation  H/o \"two mini strokes\"  S/p fall with hip facture   · Right hip hemmiarthroplasty (5/23/18) by Dr. Ney Archuleta)  · S/p removed hip hardwarare due to pain (4/15/19)  COPD severe  CKD, stage 4  Hyperkalemia, resolved  Pulmonary hypertension  Cardiac risk factors:  · Morbid obesity, Body mass index is 42.29 kg/m². · DM2 insulin dependent  · JED on Trilogy  · HL  Urinary incontinence, severe  · no procedures due to anticoagulation  Endometrial cancer (2002)  HTN  HL     CARDIAC IMAGING:  Echo (9/24/19) LVEF 20-25%, AV opens 1:1, no AR  Echo (5/29/18) LVEF 10-%, ramps study done, report in [facility]  Echo (1/9/18) ramp study done, LVEDD 7.1cm  LHC (11/9/18) 2 vessel disease with 90% OM, 80% PDA, DSA to PDA branch     INTERVAL HISTORY:  Tolerating HD  Cr trending down     Hgb stable   proBNP down today to 38199  Myoclonal jerking improved   CXR negative    LVAD INTERROGATION:  Device interrogated in person  Device function normal, normal flow, no events    LVAD   Pump Speed (RPM): 9600  Pump Flow (LPM): 5.98  MAP: 90  PI (Pulsitility Index): 2.9  Power: 6.4   Test: Yes  Back Up  at Bedside & Labeled: Yes  Power Module Test: Yes  Driveline Site Care: No  Driveline Dressing: Clean, Dry, and Intact  Outpatient: No  MAP in Therapeutic Range (Outpatient): Yes  Testing  Alarms Reviewed: Yes  Back up SC speed: 9600  Back up Low Speed Limit: 9200  Emergency Equipment with Patient?: Yes  Emergency procedures reviewed?: Yes  Drive line site inspected?: Yes  Drive line intergrity inspected?: Yes  Drive line dressing changed?: No    PHYSICAL EXAM:  Visit Vitals  BP (!) 80/0   Pulse 87   Temp 98.1 °F (36.7 °C)   Resp 21   Ht 5' 7\" (1.702 m)   Wt 260 lb (117.9 kg)   SpO2 92%   BMI 40.72 kg/m²     General: Patient is well developed, more alert. Improved tremors  HEENT: Normocephalic and atraumatic. No scleral icterus.  Pupils are equal, round and reactive to light and accomodation. No conjunctival injection. Oropharynx is clear. Flushed cheeks. Neck: Supple. No evidence of thyroid enlargements or lymphadenopathy. JVD: Cannot be appreciated   Lungs: Breath sounds are equal and clear bilaterally. + wheezing. Heart: Regular rate and rhythm with normal S1 and S2. No murmurs, gallops or rubs. Abdomen: Soft, no mass or tenderness. No organomegaly or hernia. Bowel sounds present. Genitourinary and rectal: deferred  Extremities: BLE + venous stasis   Neurologic: Generalized myoclonal jerking, intentional tremors, +asterixis   Psychiatric: More alert today  Skin: Warm, dry and well perfused. No lesions, nodules or rashes are noted. REVIEW OF SYSTEMS:  General: Denies fever, night sweats. Ear, nose and throat: Denies difficulty hearing, sinus problems, runny nose, post-nasal drip, ringing in ears, mouth sores, loose teeth, ear pain, nosebleeds, sore throate, facial pain or numbess  Cardiovascular: see above in the interval history  Respiratory: Denies cough, wheezing, sputum production, hemoptysis.   Gastrointestinal: Denies heartburn, constipation, intolerance to certain foods, diarrhea, abdominal pain, nausea, vomiting, difficulty swallowing, blood in stool  Kidney and bladder: Denies painful urination, frequent urination, urgency, prostate problems and impotence  Musculoskeletal: Denies joint pain, muscle weakness  Skin and hair: Denies change in existing skin lesions, hair loss or increase, breast changes    PAST MEDICAL HISTORY:  Past Medical History:   Diagnosis Date    Asthma     Cancer ([facility] [address]     breast    Cancer ([facility] [address]     endometrial    Congestive heart failure, unspecified     CRI (chronic renal insufficiency)     Depression     Diabetes ([facility] [address]     Hypertension        PAST SURGICAL HISTORY:  Past Surgical History:   Procedure Laterality Date    HX HERNIA REPAIR      HX HYSTERECTOMY      HX MASTECTOMY      IR INSERT NON TUNL CVC OVER 5 YRS  4/26/2021       FAMILY HISTORY:  No family history on file. SOCIAL HISTORY:  Social History     Socioeconomic History    Marital status:      Spouse name: Not on file    Number of children: Not on file    Years of education: Not on file    Highest education level: Not on file   Tobacco Use    Smoking status: Never Smoker    Smokeless tobacco: Never Used   Substance and Sexual Activity    Alcohol use: Not Currently       LABORATORY RESULTS:     Labs Latest Ref Rng & Units 4/28/2021 4/27/2021 4/27/2021 4/27/2021 4/26/2021 4/26/2021 4/26/2021   WBC 3.6 - 11.0 K/uL 3. 0(L) - - 3. 4(L) - - 4.9   RBC 3.80 - 5.20 M/uL 3.15(L) - - 3.03(L) - - 3.13(L)   Hemoglobin 11.5 - 16.0 g/dL 8. 3(L) - - 8. 0(L) - - 8. 1(L)   Hematocrit 35.0 - 47.0 % 28. 5(L) - - 27. 6(L) - - 27. 8(L)   MCV 80.0 - 99.0 FL 90.5 - - 91.1 - - 88.8   Platelets 474 - 834 K/uL 137(L) - - 125(L) - - 130(L)   Lymphocytes 12 - 49 % 14 - - 13 - - 12   Monocytes 5 - 13 % 9 - - 9 - - 8   Eosinophils 0 - 7 % 0 - - 0 - - 0   Basophils 0 - 1 % 1 - - 0 - - 0   Albumin 3.5 - 5.0 g/dL 3. 3(L) - 3.6 3.4(L) - - -   Calcium 8.5 - 10.1 MG/DL 8.7 - 8.9 9.0 9.2 9.0 -   Glucose 65 - 100 mg/dL 79 - 101(H) 62(L) 86 108(H) -   BUN 6 - 20 MG/DL 33(H) - 56(H) 55(H) 77(H) 75(H) -   Creatinine 0.55 - 1.02 MG/DL 2.25(H) - 3.16(H) 3.03(H) 4.15(H) 4.29(H) -   Sodium 136 - 145 mmol/L 138 - 138 140 138 136 -   Potassium 3.5 - 5.1 mmol/L 3.8 3.8 3.9 3.8 4.3 4.2 -   TSH 0.36 - 3.74 uIU/mL - - - - - - -   LDH 81 - 246 U/L 188 - - 178 - - 185   Some recent data might be hidden     Lab Results   Component Value Date/Time    TSH 5.36 (H) 04/24/2021 04:32 AM    TSH 3.99 (H) 04/17/2021 04:54 AM    TSH 2.980 10/01/2020 12:00 AM       ALLERGY:  Allergies   Allergen Reactions    Benzodiazepines Other (comments)     Respiratory issues        CURRENT MEDICATIONS:    Current Facility-Administered Medications:     warfarin (COUMADIN) tablet 3 mg, 3 mg, Oral, ONCE, Bjorn Meckel, MD Sena Macintosh benzocaine-zinc cl-benzalkonium cl (ORAJEL) 20-0.1-0.02 % mucosal gel, , Oral, PRN, Luis Brizuela MD, Given at 04/27/21 2211    lactulose (CHRONULAC) 10 gram/15 mL solution 30 mL, 20 g, Oral, DAILY, Polliard, Deboraha Ebbing T, NP, 30 mL at 04/28/21 0838    heparin (porcine) 1,000 unit/mL injection 1,100 Units, 1,100 Units, InterCATHeter, DIALYSIS PRN, 1,100 Units at 04/27/21 1818 **AND** heparin (porcine) 1,000 unit/mL injection 1,400 Units, 1,400 Units, InterCATHeter, DIALYSIS PRN, Arlin Solorzano MD, 1,400 Units at 04/27/21 1818    glucose chewable tablet 16 g, 4 Tab, Oral, PRN, Luis Brizuela MD    dextrose (D50W) injection syrg 12.5-25 g, 12.5-25 g, IntraVENous, PRN, Luis Brizuela MD, 25 g at 04/26/21 2246    glucagon (GLUCAGEN) injection 1 mg, 1 mg, IntraMUSCular, PRN, Luis Brizuela MD    0.9% sodium chloride infusion 250 mL, 250 mL, IntraVENous, PRN, Kraig Austin NP    budesonide-formoteroL (SYMBICORT) 160-4.5 mcg/actuation HFA inhaler 1 Puff (Patient Supplied), 1 Puff, Inhalation, BID RT, Luis Brizuela MD, 1 Puff at 04/28/21 0855    bumetanide (BUMEX) injection 2 mg, 2 mg, IntraVENous, TID, Polliard, Deboraha Ebbing T, NP, 2 mg at 04/28/21 0837    potassium chloride SR (KLOR-CON 10) tablet 20 mEq, 20 mEq, Oral, DAILY, Polliard, Deboraha Ebbing T, NP, 20 mEq at 04/28/21 0836    albuterol (PROVENTIL HFA, VENTOLIN HFA, PROAIR HFA) inhaler 2 Puff (Patient Supplied), 2 Puff, Inhalation, Q4H PRN, Luis Brizuela MD    epoetin amalia-epbx (RETACRIT) injection 20,000 Units, 20,000 Units, SubCUTAneous, Q MON, WED & FRI, Jaspreet Mendoza MD, 20,000 Units at 04/26/21 2310    oxymetazoline (AFRIN) 0.05 % nasal spray 2 Spray, 2 Spray, Both Nostrils, BID PRN, Polliard, Karinae Adela, NP, 2 [place] at 04/22/21 1800    sodium chloride (OCEAN) 0.65 % nasal squeeze bottle 2 Spray, 2 Spray, Both Nostrils, Q2H PRN, Polliard, Karinae Oraa, NP    therapeutic multivitamin (THERAGRAN) tablet 1 Tab, 1 Tab, Oral, DAILY, Polliard, Farrar Battles, NP, 1 Tab at 04/28/21 0836    polyethylene glycol (MIRALAX) packet 17 g, 17 g, Oral, DAILY, Polliard, Brawley Do MECHE, NP, 17 g at 04/28/21 0836    isosorbide mononitrate ER (IMDUR) tablet 30 mg, 30 mg, Oral, DAILY, Pollmarisold Joel Do MECHE, NP, 30 mg at 04/28/21 0836    cephALEXin (KEFLEX) capsule 250 mg, 250 mg, Oral, BID, Dirk Cockayne, MD, 250 mg at 04/28/21 0836    sodium chloride (NS) flush 5-40 mL, 5-40 mL, IntraVENous, Q8H, Dana Walter, NP, 10 mL at 04/28/21 0641    sodium chloride (NS) flush 5-40 mL, 5-40 mL, IntraVENous, PRN, Jose Angel Brawleyboyd Driver, NP, 10 mL at 04/22/21 1238    acetaminophen (TYLENOL) tablet 650 mg, 650 mg, Oral, Q4H PRN, Dana Walter, NP, 650 mg at 04/28/21 0836    hydrALAZINE (APRESOLINE) 20 mg/mL injection 10 mg, 10 mg, IntraVENous, Q4H PRN, Jose Angel Brawley Do T, NP, 10 mg at 04/21/21 6609    hydrALAZINE (APRESOLINE) tablet 100 mg, 100 mg, Oral, TID, Jose Angel Joel Do MECHE, NP, 100 mg at 04/28/21 1214    hydrOXYzine HCL (ATARAX) tablet 10 mg, 10 mg, Oral, TID PRN, Charan Walter NP    insulin glargine (LANTUS) injection 20 Units, 20 Units, SubCUTAneous, BID, Charan Walter NP, 20 Units at 04/28/21 0800    PARoxetine (PAXIL) tablet 20 mg, 20 mg, Oral, DAILY, Jose Angel Brawley Do MECHE, NP, 20 mg at 04/28/21 3970    ranolazine ER (RANEXA) tablet 1,000 mg, 1,000 mg, Oral, BID, Charan Walter NP, 1,000 mg at 04/28/21 6496    tafamidis cap 61 mg (Patient Supplied), 61 mg, Oral, DAILY, Charan Walter NP, 61 mg at 04/28/21 0900    warfarin dosing per pharmacy, , Other, Rx Dosing/Monitoring, Charan Walter NP    miconazole (MICOTIN) 2 % powder, , Topical, BID, Dirk Cockayne, MD, Given at 04/28/21 1121    PATIENT CARE TEAM:  Patient Care Team:  Scottie Dutton NP as PCP - General (Nurse Practitioner)  Miguelangel Santiago MD (Cardiology)  Abelardo Quintanilla MD as Physician (Cardiology)     Thank you for allowing me to participate in this patient's care.     Coleen Perez Belem Boswell NP  [address] [place] [address], [address]  Phone: [phone]    F ATTENDING ADDENDUM    Patient was seen and examined in person. Data and notes were reviewed. I have discussed and agree with the plan as noted in the NP note above without further additions.     Christa Merlos MD PhD  [facility]as[facility]or [phone]

## 2021-04-28 NOTE — PROGRESS NOTES
0730:  Bedside shift change report given to Ed Gamino RN (oncoming nurse) by Libra Alexander RN (offgoing nurse). Report included the following information SBAR, Kardex, Procedure Summary, Intake/Output, MAR and Recent Results. 1030:  Patient up to bedside commode and then recliner with PT. Declined CHG bath at this time. 1300:  Two RNs assisted patient to stand on scale, weight obtained. Patient returned to bed. MAP SITTIN  MAP STANDIN    1330:  Driveline dressing change performed with Domenic Yanez NP at bedside. New orders for dressing to be changed daily with xeroform gauze around driveline. 1400:  Dialysis at bedside. 1745:  Dialysis complete, patient tolerated well.    193:  Bedside shift change report given to Elaina Stephens RN (oncoming nurse) by Ed Gamino RN (offgoing nurse). Report included the following information SBAR, Kardex, Procedure Summary, Intake/Output, MAR and Recent Results. Problem: Patient Education: Go to Patient Education Activity  Goal: Patient/Family Education  Outcome: Progressing Towards Goal     Problem: Pressure Injury - Risk of  Goal: *Prevention of pressure injury  Description: Document Jaime Scale and appropriate interventions in the flowsheet.   Outcome: Progressing Towards Goal  Note: Pressure Injury Interventions:  Sensory Interventions: Assess changes in LOC    Moisture Interventions: Absorbent underpads, Limit adult briefs, Minimize layers    Activity Interventions: Increase time out of bed, Pressure redistribution bed/mattress(bed type), PT/OT evaluation    Mobility Interventions: HOB 30 degrees or less, Pressure redistribution bed/mattress (bed type)    Nutrition Interventions: Document food/fluid/supplement intake    Friction and Shear Interventions: Lift sheet, Minimize layers

## 2021-04-28 NOTE — PROGRESS NOTES
Pharmacist Note - Warfarin Dosing  Consult provided for this 78 y. o.female to manage warfarin for LVAD    INR Goal: 1.8-2.5 per NP (lowered due to GIB)    Home regimen/ tablet size: 4 mg daily     Drugs that may increase INR: None  Drugs that may decrease INR: None  Other current anticoagulants/ drugs that may increase bleeding risk: None  Risk factors: Age > 65  Daily INR ordered: YES    Recent Labs     04/28/21  0258 04/27/21  0517 04/26/21  0245   HGB 8.3* 8.0* 8.1*   INR 2.1* 1.8* 1.8*     Date               INR                  Dose  4/16  1.6  4 mg    4/17  1.8  4 mg   4/18  1.8  4 mg   4/19  1.8  4 mg   4/20                 2.0                  4 mg  4/21                 2.1                  4 mg  4/22                 2.2                  4 mg  4/23                 2.6                  2.5 mg  4/24  2.7  2.5 mg  4/25  2.5  2.5 mg  4/26                1.8                   4 mg    4/27                1.8                   4 mg   4/28  2.1  3 mg                                                                                                                                                        Assessment/ Plan: Will order warfarin 3 mg PO x 1 dose. Pharmacy will continue to monitor daily and adjust therapy as indicated. Please contact the pharmacist at x [phone] for outpatient recommendations if needed.

## 2021-04-28 NOTE — PROGRESS NOTES
SW met with patient and daughter, patient was up to chair and stated she is feeling somewhat better after dialysis. Per Kiesha Huerta, patient's daughter the plan is for the patient to continue dialysis for 3 hours today and then hold off to see how she is doing Friday. Patient may potentially have dialysis done in the community at [facility], unless decision is made for patient to reside with Kiesha Huerta in [facility], then if patient requires dialysis it may be completed at [facility]?  TIMOTHY will continue to follow

## 2021-04-28 NOTE — PROGRESS NOTES
Progress Note Patient: Brandan Lerner MRN: [id number]  SSN: [id number] YOB: 1950  Age: 78 y.o. Sex: female Admit Date: 4/16/2021 LOS: 12 days Subjective:  
 
Brandan Lerner is a 78 y.o. female admitted on 10/27/2020  for non-ischemic cardiomyopathy,  acute on chronic systolic CHF (congestive heart failure) ([facility] [address] [I50.23]. Has past medical history of chronic systolic CHF status post AICD, cardiogenic shock, s/p impella bridge to LVAD, post LVAD implantation (4/15/2017 by Dr. Noam Swanson), RV failure, CAD (left heart catheterization 8/2016 with high-grade ramus and small PDA disease borderline disease of LAD and takeoff of pRCA), A. fib, pulmonary hypertension, JED on CPAP, diabetes type 2, HLD, severe COPD, CKD, morbid obesity, hypertension, breast cancer status post bilateral mastectomy/chemo, and endometrial cancer status post hysterectomy, lymphedema left lower extremity, severe COPD. Had previously been followed by advanced heart failure at [facility] but now followed by Dr. Angus Herron. Reports she has been being treated for abscess of sternal wound since late 2019. She presented on 10/32 2020 with right lower extremity cellulitis and sternal wound infection is suspected. ID is following and patient is on daptomycin. Cardiology has been asked to perform right heart cath and to establish general cardiology care. Patient is on chronic anticoagulation for LVAD with Coumadin. Patient denies any history of MI. She does report history of Plavix resistance. Her daughter reports patient has coronary stent she believes in perhaps 2017. Patient reports she is able to walk in the hallways without chest pain does develop shoulder pain which she attributes to leaning on walker. Has intermittent shortness of breath. Currently denies chest pain shortness of breath. States she is able to lay flat. No recent history of AICD firing. No chest pain or shortness of breath.  Less myoclonus after starting dialysis. 
  
 
Objective:  
 
Vitals:  
 21 0858 21 1115 21 1259 21 1300 Pulse:  87 80 80 Resp:  21 Temp:  98.1 °F (36.7 °C) TempSrc:      
SpO2:  92% Weight: 260 lb (117.9 kg)  255 lb 14.9 oz (116.1 kg) Height: 5' 7\" (1.702 m) Temp (24hrs), Av.3 °F (36.8 °C), Min:97.5 °F (36.4 °C), Max:99.7 °F (37.6 °C) Intake and Output: 
Current Shift:  07 - 1900 In: 480 [P.O.:480] Out: 200 [Urine:200] Last three shifts: 1901 -  0700 In: 580 [P.O.:580] Out: 1825 [Urine:825] Physical Exam: Obese General:  Alert, cooperative, well nourished, well developed, appears stated age Eyes:  Conjunctivae/corneas clear Nose: Nares normal. Septum midline. Mucosa normal. No drainage or sinus tenderness. Neck: Supple, symmetrical, trachea midline, no JVD Lungs:   Clear to auscultation bilaterally, good effort Heart:  Regular rate and rhythm, no murmur, click, rub, or gallop Abdomen:   Soft, non-tender, bowel sounds normal, non-distended Extremities: 2+ edema with stasis dermatitis Skin: Skin color, texture, turgor normal.  
Neurologic: Non focal 
  
 
Current Facility-Administered Medications:  
  warfarin (COUMADIN) tablet 3 mg, 3 mg, Oral, ONCE, Bjorn Meckel, MD 
  [START ON 2021] iron sucrose (VENOFER) 200 mg in 0.9% sodium chloride 100 mL IVPB, 200 mg, IntraVENous, DAILY, BraxtonEverardoin B, NP 
  benzocaine-zinc cl-benzalkonium cl (ORAJEL) 20-0.1-0.02 % mucosal gel, , Oral, PRN, Bjorn Meckel, MD, Given at 211 
  lactulose (CHRONULAC) 10 gram/15 mL solution 30 mL, 20 g, Oral, DAILY, Polliard, Eleonora Grise T, NP, 30 mL at 21 7242   heparin (porcine) 1,000 unit/mL injection 1,100 Units, 1,100 Units, InterCATHeter, DIALYSIS PRN, 1,100 Units at 21 1818 **AND** heparin (porcine) 1,000 unit/mL injection 1,400 Units, 1,400 Units, InterCATHeter, DIALYSIS PRN, Johnathon Elliott, MD, 1,400 Units at 04/27/21 1818 
  glucose chewable tablet 16 g, 4 Tab, Oral, PRN, Tricia Sanders MD 
  dextrose (D50W) injection syrg 12.5-25 g, 12.5-25 g, IntraVENous, PRN, Tricia Sanders MD, 25 g at 04/26/21 2246 
  glucagon (GLUCAGEN) injection 1 mg, 1 mg, IntraMUSCular, PRN, Tricia Sanders MD 
  0.9% sodium chloride infusion 250 mL, 250 mL, IntraVENous, PRN, Tabitha Giron NP 
  budesonide-formoteroL (SYMBICORT) 160-4.5 mcg/actuation HFA inhaler 1 Puff (Patient Supplied), 1 Puff, Inhalation, BID RT, Tricia Sanders MD, 1 Puff at 04/28/21 0701   bumetanide (BUMEX) injection 2 mg, 2 mg, IntraVENous, TID, Jimbo Walter NP, 2 mg at 04/28/21 2683   potassium chloride SR (KLOR-CON 10) tablet 20 mEq, 20 mEq, Oral, DAILY, Jimbo Walter NP, 20 mEq at 04/28/21 0836 
  albuterol (PROVENTIL HFA, VENTOLIN HFA, PROAIR HFA) inhaler 2 Puff (Patient Supplied), 2 Puff, Inhalation, Q4H PRN, Tricia Sanders MD 
  epoetin amalia-epbx (RETACRIT) injection 20,000 Units, 20,000 Units, SubCUTAneous, Q MON, WED & FRI, Jaspreet Mendoza MD, 20,000 Units at 04/26/21 2310 
  oxymetazoline (AFRIN) 0.05 % nasal spray 2 Spray, 2 Spray, Both Nostrils, BID PRN, Imani Walter NP, 2 [place] at 04/22/21 1800 
  sodium chloride (OCEAN) 0.65 % nasal squeeze bottle 2 Spray, 2 Spray, Both Nostrils, Q2H PRN, Imani Walter NP 
  therapeutic multivitamin (THERAGRAN) tablet 1 Tab, 1 Tab, Oral, DAILY, Imani Walter NP, 1 Tab at 04/28/21 0836 
  polyethylene glycol (MIRALAX) packet 17 g, 17 g, Oral, DAILY, Jimbo Walter NP, 17 g at 04/28/21 5290   isosorbide mononitrate ER (IMDUR) tablet 30 mg, 30 mg, Oral, DAILY, Jimbo Walter NP, 30 mg at 04/28/21 0984   cephALEXin (KEFLEX) capsule 250 mg, 250 mg, Oral, BID, Chayo Reese MD, 250 mg at 04/28/21 5549   sodium chloride (NS) flush 5-40 mL, 5-40 mL, IntraVENous, Q8H, Dana Walter NP, 10 mL at 04/28/21 0641 
  sodium chloride (NS) flush 5-40 mL, 5-40 mL, IntraVENous, PRN, Debbie Walter NP, 10 mL at 04/22/21 1238   acetaminophen (TYLENOL) tablet 650 mg, 650 mg, Oral, Q4H PRN, Dillon Walter NP, 650 mg at 04/28/21 4119   hydrALAZINE (APRESOLINE) 20 mg/mL injection 10 mg, 10 mg, IntraVENous, Q4H PRN, Debbie Walter NP, 10 mg at 04/21/21 9885   hydrALAZINE (APRESOLINE) tablet 100 mg, 100 mg, Oral, TID, Debbie Walter NP, 100 mg at 04/28/21 9373   hydrOXYzine HCL (ATARAX) tablet 10 mg, 10 mg, Oral, TID PRN, Dillon Walter NP 
  insulin glargine (LANTUS) injection 20 Units, 20 Units, SubCUTAneous, BID, Dillon Walter NP, 20 Units at 04/28/21 0800   PARoxetine (PAXIL) tablet 20 mg, 20 mg, Oral, DAILY, Debbie Walter NP, 20 mg at 04/28/21 0836 
  ranolazine ER (RANEXA) tablet 1,000 mg, 1,000 mg, Oral, BID, Dillon Walter NP, 1,000 mg at 04/28/21 7740   tafamidis cap 61 mg (Patient Supplied), 61 mg, Oral, DAILY, Debbie Walter NP, 61 mg at 04/28/21 0900   warfarin dosing per pharmacy, , Other, Rx Dosing/Monitoring, Dillon Walter NP 
  miconazole (MICOTIN) 2 % powder, , Topical, BID, Shane Del Cid MD, Given at 04/28/21 1121 Lab/Data Review: 
Labs Latest Ref Rng & Units 4/28/2021 4/27/2021 4/27/2021 4/27/2021 4/26/2021 4/26/2021 4/26/2021 WBC 3.6 - 11.0 K/uL 3. 0(L) - - 3. 4(L) - - 4.9  
RBC 3.80 - 5.20 M/uL 3.15(L) - - 3.03(L) - - 3.13(L) Hemoglobin 11.5 - 16.0 g/dL 8. 3(L) - - 8. 0(L) - - 8. 1(L) Hematocrit 35.0 - 47.0 % 28. 5(L) - - 27. 6(L) - - 27. 8(L) MCV 80.0 - 99.0 FL 90.5 - - 91.1 - - 88.8 Platelets 083 - 404 K/uL 137(L) - - 125(L) - - 130(L) Lymphocytes 12 - 49 % 14 - - 13 - - 12 Monocytes 5 - 13 % 9 - - 9 - - 8 Eosinophils 0 - 7 % 0 - - 0 - - 0 Basophils 0 - 1 % 1 - - 0 - - 0 Albumin 3.5 - 5.0 g/dL 3. 3(L) - 3.6 3.4(L) - - -  
Calcium 8.5 - 10.1 MG/DL 8.7 - 8.9 9.0 9.2 9.0 - Glucose 65 - 100 mg/dL 79 - 101(H) 62(L) 86 108(H) -  
BUN 6 - 20 MG/DL 33(H) - 56(H) 55(H) 77(H) 75(H) -  
Creatinine 0.55 - 1.02 MG/DL 2.25(H) - 3.16(H) 3.03(H) 4.15(H) 4.29(H) - Sodium 136 - 145 mmol/L 138 - 138 140 138 136 - Potassium 3.5 - 5.1 mmol/L 3.8 3.8 3.9 3.8 4.3 4.2 -  
TSH 0.36 - 3.74 uIU/mL - - - - - - -  
LDH 81 - 246 U/L 188 - - 178 - - 185 Some recent data might be hidden 10/27/20 ECHO ADULT COMPLETE 11/02/2020 11/2/2020 Narrative · LVAD speed 9400-AV opens each beat, MV with decreased excursion LVIDd  
6.2 cm LVIDs 5.8 cm · LV: Estimated LVEF is 15 - 20%. · LA: Mildly dilated left atrium. · AV: Sclerosis with no significant stenosis. · RV: Mildly dilated right ventricle. Mildly reduced systolic function. Pacer/ICD present. · Image quality for this study was technically difficult. Signed by: Shayne Huerta MD  
 
 
10/27/20 CARDIAC PROCEDURE 11/03/2020 11/3/2020 Narrative · CO/CI = 5.7/2.2 (Sal) · Wedge mean = 15 mmHg. · LVAD in situ 6Fr right internal jugular Vascade closure to facilitate manual pressure INR 2.6 on warfarin Tolerated well. Signed by: Jean Pierre Jeffers MD  
 
 
Assessment:  
 
Active Problems: 
  Obesity, morbid ([facility] [address] (10/1/2020) Acute on chronic systolic CHF (congestive heart failure) ([address] (10/27/2020) NICM (nonischemic cardiomyopathy) ([address] (11/4/2020) Coronary artery disease involving native coronary artery of native heart without angina pectoris (11/4/2020) Chronic venous insufficiency (11/4/2020) Anemia (11/9/2020) Coagulopathy ([address] (11/10/2020) Open wound of left lower leg (11/10/2020) Dyspnea (4/16/2021) Plan: 1. Acute on chronic systolic CHF and RV failure/Cardiomyopathy; Non-ischemic Cardiomyopathy -NYHA IV 
            -s/p LVAD 2017  
            -s/p ICD  
            -Management per AHF  
            -Coreg, entresto, bumex 
  
2.  Hx of CAD:  
             -[facility] 8/2016 with high-grade ramus and small PDA disease borderline [facility] -[facility] records from [facility]. ?hx of stents. -CAD Appears stable 
            -continue ASA, not on statin PTA (await records for reason) 
  
3. Venous stasis, RLE venous stasis wound, RLE cellulitis 
            -plan outpatient venous ultrasound for reflux evaluation 4. Hx of Afib 
            -currently v paced 
            -on Coumadin for LVAD  
  
5. HLD: , HDL 29. Consider statin/ await records 
  6. Hx of AICD: brick&mobile device 
            -Dr. Abbi Aguilar to establish care/follow up 
            -Had PPM interrogated this admission- normal pacing and sensing.  
  
7. Sternal wound infection 
            -chronic antibiotics 8. ESRD on HD 
 - improved tremors Overall prognosis poor; however, daughter would like everything done. Signed By: Chris Claude, MD   
 April 28, 2021 Interventional Cardiology [facility] [address], [address] P: [phone] F: [phone]

## 2021-04-29 NOTE — PROGRESS NOTES
[facility] Adult  Hospitalist Group Hospitalist Progress Note Constanza Burton MD 
Answering service: [phone] OR 36 from in house phone NAME:  West Champagne :  1950 MRN:  [id number] Admission Summary:  
West Champagne is a 78 y.o. female with a past medical history of heart failure s/p LVAD, COPD, CKD stage 3, pulmonary HTN, Depression, HTN, and HLD who presented to [facility] appointment with complaints of chest tightness and increasing dyspnea. She was instructed to come to [facility] for admission for further treatment. At time of examination patient experiencing dyspnea but states she's feeling 100% better than what she felt yesterday. Interval history / Subjective:  
 
Patient seen and examined Heart failure team adjusting meds Creatinine elevated nephrology managing dialysis Severe tremors improved with dialysis Assessment & Plan:  
 
 Acute on Chronic systolic congestive heart failure , s/p LVAD 
- NYHA class IV ;  s/p LVAD heartMate 2 , increased speed per HF team  
- Advanced heart failure team following and adjusting meds , anticoagulation with warfarin [facility]-  to measure heart pressures  
  
COPD-  Stopped nebs- resume home inhalers 
symbicort and albuterol Severe tremors- unclear etiology- stopped nebs and thyroid med Improved after blood transfusion and dialysis,  
neurology eval completed- neurontin discontinued Subclinical hypothyroidism- normal free T4 with minimally elevated TSH Do not recommend any treatment or thyroid meds for these labs- DC synthroid Constipation with KUB suggesting fecal impaction- resolved after suppository CKD stage 3 Creatinine elevated due to diuresis Nephrology consulted and managing Adjusting bumex doses/regimen 
starting dialysis 21 
  
DM  
- A1c 7.2  
- Monitor BG AC/HS  
- Continue lantus , accuchecks and ss insulin 
  
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  
  
JED  
-  home trilogy, BiPAP inpatient if patient able to tolerate this Depression - cont Paroxetine H/o breast cancer (1992) · s/p bilateral mastectomy/chemo and endometrial cancer s/p hysterectomy Pancytopenia- stable but persists Etiology unknown at this point Hematology consulted for eval and tx 
 transfused PRBC this admission Code status: full DVT prophylaxis: warfarin- therapeutic INR Care Plan discussed with: Patient/Family Anticipated Disposition: Home w/Family Anticipated Discharge: next week- looking for rehab- maybe near [facility] that can manage LVAD, trilogy, and dialysis if needed Hospital Problems  Date Reviewed: 4/25/2021 Codes Class Noted POA Dyspnea ICD-10-CM: R06.00 
ICD-9-CM: 786.09  4/16/2021 Yes Coagulopathy [facility] ICD-10-CM: G45.2 ICD-9-CM: 286.9  11/10/2020 Yes Open wound of left lower leg ICD-10-CM: M66.533N ICD-9-CM: 891.0  11/10/2020 Yes Anemia ICD-10-CM: D64.9 ICD-9-CM: 285.9  11/9/2020 Yes NICM (nonischemic cardiomyopathy) ([facility] ICD-10-CM: I42.8 ICD-9-CM: 425.4  11/4/2020 Yes Coronary artery disease involving native coronary artery of native heart without angina pectoris ICD-10-CM: I25.10 ICD-9-CM: 414.01  11/4/2020 Yes Chronic venous insufficiency ICD-10-CM: I87.2 ICD-9-CM: 459.81  11/4/2020 Yes Acute on chronic systolic CHF (congestive heart failure) (H[facility]) ICD-10-CM: B82.86 ICD-9-CM: 428.23, 428.0  10/27/2020 Yes Obesity, morbid ([facility] ICD-10-CM: E66.01 
ICD-9-CM: 278.01  10/1/2020 Yes Review of Systems: A comprehensive review of systems was negative except for that written in the HPI. Vital Signs:  
 Last 24hrs VS reviewed since prior progress note. Most recent are: 
Visit Vitals BP (!) 80/0 Pulse 78 Temp 98.2 °F (36.8 °C) Resp 17 Ht 5' 7\" (1.702 m) Wt 116.1 kg (255 lb 14.9 oz) SpO2 95% BMI 40.08 kg/m² Patient Vitals for the past 24 hrs: 
 Temp Pulse Resp SpO2  
04/28/21 2133    95 % 04/28/21 2058 98.2 °F (36.8 °C) 78 17 95 % 04/28/21 2022 98.3 °F (36.8 °C) 80 19 95 % 04/28/21 1700 97.9 °F (36.6 °C) 78 23   
04/28/21 1645  74 24 94 % 04/28/21 1630  71 27   
04/28/21 1615  74 18 94 % 04/28/21 1600  72 25 95 % 04/28/21 1545  73 22   
04/28/21 1530  72 22 95 % 04/28/21 1515  75 28 94 % 04/28/21 1500 97.9 °F (36.6 °C) 77 19 95 % 04/28/21 1445  74 22   
04/28/21 1430  74 24   
04/28/21 1415  75 19   
04/28/21 1400 97.9 °F (36.6 °C) 79 21 94 % 04/28/21 1300  80    
04/28/21 1259  80    
04/28/21 1115 98.1 °F (36.7 °C) 87 21 92 % 04/28/21 0813 98.1 °F (36.7 °C) 75 17 91 % 04/28/21 0300 97.5 °F (36.4 °C) 70 18 98 % 04/28/21 0014 99.7 °F (37.6 °C) 76 24 97 % Intake/Output Summary (Last 24 hours) at 4/28/2021 2308 Last data filed at 4/28/2021 2058 Gross per 24 hour Intake 720 ml Output 1360 ml Net -640 ml Physical Examination:  
     
Constitutional: NAD, awake and alert ENT:  Oral mucosa moist, Resp: Decreased breath sounds, no wheezing CV:  audible LVAD motor GI:  Soft, non distended, non tender. normoactive bowel sounds, no hepatosplenomegaly Musculoskeletal:  mild-mod edema, warm, wounds on bilat LE Neurologic:  Moves all extremities. AAOx3, CN II-XII reviewed, diffuse mild tremors in upper and lower extremities. Data Review:  
 Review and/or order of clinical lab test 
XR CHEST PORT 
  
INDICATION: Possible aspiration 
  
COMPARISON: 4/26/2021 at 1406 hours 
  
TECHNIQUE: Portable AP upright chest view at 0950 hours 
  
FINDINGS: The support devices are stable. There is stable cardiac silhouette 
enlargement. The pulmonary vasculature is within normal limits.  
  
The lungs and pleural spaces are clear.  There is no pneumothorax. The bones and 
upper abdomen are stable. 
  
IMPRESSION 
  
Clear lungs. Stable cardiac silhouette enlargement. 
  
 
Labs:  
 
Recent Labs  
  04/28/21 0258 04/27/21 
0517 WBC 3.0* 3.4* HGB 8.3* 8.0*  
HCT 28.5* 27.6*  
* 125* Recent Labs  
  04/28/21 0258 04/27/21 
2217 04/27/21 
0944 04/27/21 
0517 04/26/21 
1711   --  138 140 138  
K 3.8 3.8 3.9 3.8 4.3   --  101 104 99 CO2 30  --  33* 30 32 BUN 33*  --  56* 55* 77* CREA 2.25*  --  3.16* 3.03* 4.15* GLU 79  --  101* 62* 86  
CA 8.7  --  8.9 9.0 9.2 MG 2.3  --   --  2.6* 2.7* PHOS 2.2*  --  3.5 3.5  --   
 
Recent Labs  
  04/28/21 0258 04/27/21 
0944 04/27/21 
0517 ALT 9*  --  10* AP 52  --  47  
TBILI 0.6  --  0.7 TP 7.5  --  7.6 ALB 3.3* 3.6 3.4*  
GLOB 4.2*  --  4.2* Recent Labs  
  04/28/21 0258 04/27/21 
0517 04/26/21 
0245 INR 2.1* 1.8* 1.8* PTP 21.3* 18.1* 18.3* Recent Labs  
  04/27/21 
3830 TIBC 213* PSAT 10* FERR 150 Lab Results Component Value Date/Time Folate 10.0 10/28/2020 03:56 AM  
  
No results for input(s): PH, PCO2, PO2 in the last 72 hours. No results for input(s): CPK, CKNDX, TROIQ in the last 72 hours. No lab exists for component: CPKMB Lab Results Component Value Date/Time Cholesterol, total 129 04/16/2021 10:40 AM  
 HDL Cholesterol 33 04/16/2021 10:40 AM  
 LDL, calculated 74 04/16/2021 10:40 AM  
 Triglyceride 110 04/16/2021 10:40 AM  
 CHOL/HDL Ratio 3.9 04/16/2021 10:40 AM  
 
Lab Results Component Value Date/Time Glucose (POC) 147 (H) 04/28/2021 08:58 PM  
 Glucose (POC) 99 04/28/2021 04:49 PM  
 Glucose (POC) 138 (H) 04/28/2021 11:17 AM  
 Glucose (POC) 79 04/28/2021 06:37 AM  
 Glucose (POC) 102 (H) 04/27/2021 09:24 PM  
 
Lab Results Component Value Date/Time  Color YELLOW/STRAW 04/18/2021 05:08 PM  
 Appearance CLEAR 04/18/2021 05:08 PM  
 Specific gravity 1.016 04/18/2021 05:08 PM  
 Specific gravity 1.013 11/09/2020 12:05 AM  
 pH (UA) 6.5 04/18/2021 05:08 PM  
 Protein 30 (A) 04/18/2021 05:08 PM  
 Glucose Negative 04/18/2021 05:08 PM  
 Ketone Negative 04/18/2021 05:08 PM  
 Bilirubin Negative 04/18/2021 05:08 PM  
 Urobilinogen 0.2 04/18/2021 05:08 PM  
 Nitrites Negative 04/18/2021 05:08 PM  
 Leukocyte Esterase SMALL (A) 04/18/2021 05:08 PM  
 Epithelial cells FEW 04/18/2021 05:08 PM  
 Bacteria Negative 04/18/2021 05:08 PM  
 WBC 0-4 04/18/2021 05:08 PM  
 RBC 0-5 04/18/2021 05:08 PM  
 
 
 
Medications Reviewed:  
 
Current Facility-Administered Medications Medication Dose Route Frequency  [START ON 4/29/2021] iron sucrose (VENOFER) 200 mg in 0.9% sodium chloride 100 mL IVPB  200 mg IntraVENous DAILY  bisacodyL (DULCOLAX) suppository 10 mg  10 mg Rectal DAILY PRN  
 benzocaine-zinc cl-benzalkonium cl (ORAJEL) 20-0.1-0.02 % mucosal gel   Oral PRN  
 lactulose (CHRONULAC) 10 gram/15 mL solution 30 mL  20 g Oral DAILY  heparin (porcine) 1,000 unit/mL injection 1,100 Units  1,100 Units InterCATHeter DIALYSIS PRN And  
 heparin (porcine) 1,000 unit/mL injection 1,400 Units  1,400 Units InterCATHeter DIALYSIS PRN  
 glucose chewable tablet 16 g  4 Tab Oral PRN  
 dextrose (D50W) injection syrg 12.5-25 g  12.5-25 g IntraVENous PRN  
 glucagon (GLUCAGEN) injection 1 mg  1 mg IntraMUSCular PRN  
 0.9% sodium chloride infusion 250 mL  250 mL IntraVENous PRN  
 budesonide-formoteroL (SYMBICORT) 160-4.5 mcg/actuation HFA inhaler 1 Puff (Patient Supplied)  1 Puff Inhalation BID RT  
 bumetanide (BUMEX) injection 2 mg  2 mg IntraVENous TID  potassium chloride SR (KLOR-CON 10) tablet 20 mEq  20 mEq Oral DAILY  albuterol (PROVENTIL HFA, VENTOLIN HFA, PROAIR HFA) inhaler 2 Puff (Patient Supplied)  2 Puff Inhalation Q4H PRN  
 epoetin amalia-epbx (RETACRIT) injection 20,000 Units  20,000 Units SubCUTAneous Q MON, WED & FRI  
 oxymetazoline (AFRIN) 0.05 % nasal spray 2 Spray  2 Spray Both Nostrils BID PRN  
 sodium chloride (OCEAN) 0.65 % nasal squeeze bottle 2 Spray  2 Spray Both Nostrils Q2H PRN  therapeutic multivitamin (THERAGRAN) tablet 1 Tab  1 Tab Oral DAILY  polyethylene glycol (MIRALAX) packet 17 g  17 g Oral DAILY  isosorbide mononitrate ER (IMDUR) tablet 30 mg  30 mg Oral DAILY  cephALEXin (KEFLEX) capsule 250 mg  250 mg Oral BID  sodium chloride (NS) flush 5-40 mL  5-40 mL IntraVENous Q8H  
 sodium chloride (NS) flush 5-40 mL  5-40 mL IntraVENous PRN  
 acetaminophen (TYLENOL) tablet 650 mg  650 mg Oral Q4H PRN  
 hydrALAZINE (APRESOLINE) 20 mg/mL injection 10 mg  10 mg IntraVENous Q4H PRN  
 hydrALAZINE (APRESOLINE) tablet 100 mg  100 mg Oral TID  hydrOXYzine HCL (ATARAX) tablet 10 mg  10 mg Oral TID PRN  
 insulin glargine (LANTUS) injection 20 Units  20 Units SubCUTAneous BID  PARoxetine (PAXIL) tablet 20 mg  20 mg Oral DAILY  ranolazine ER (RANEXA) tablet 1,000 mg  1,000 mg Oral BID  tafamidis cap 61 mg (Patient Supplied)  61 mg Oral DAILY  warfarin dosing per pharmacy   Other Rx Dosing/Monitoring  miconazole (MICOTIN) 2 % powder   Topical BID  
 
______________________________________________________________________ EXPECTED LENGTH OF STAY: 5d 7h 
ACTUAL LENGTH OF STAY:          12 
 
            
Belgica Madrid MD

## 2021-04-29 NOTE — PROGRESS NOTES
1930: Bedside and Verbal shift change report given to Adriana Salazar RN (oncoming nurse) by Randal Maya RN (offgoing nurse). Report included the following information SBAR, Kardex, Intake/Output, MAR, Recent Results and Cardiac Rhythm Paced. 2040: Patient had 27 beat run V Tach. RN went to assess patient   2044: While RN in room Patient had 2 more runs of V Tach. 25 beat run of VTach then a 33 beat run of VTach at 2052. Patient assymptomatic. Denies c/p and SOB. 2057: RN paged HF clinic.   2101: RN spoke with Yosi Sorto NP. Will check electrolytes and continue to monitor. 2113: K 4.3 magnesium 2.5    0730: Bedside and Verbal shift change report given to Mauri Barr RN (oncoming nurse) by Adriana Salazar RN (offgoing nurse). Report included the following information SBAR, Kardex, Intake/Output, MAR, Recent Results and Cardiac Rhythm Paced.

## 2021-04-29 NOTE — PROGRESS NOTES
Problem: Falls - Risk of  Goal: *Absence of Falls  Description: Document [place] Fall Risk and appropriate interventions in the flowsheet. Outcome: Progressing Towards Goal  Note: Fall Risk Interventions:  Mobility Interventions: Bed/chair exit alarm, Communicate number of staff needed for ambulation/transfer, Patient to call before getting OOB         Medication Interventions: Evaluate medications/consider consulting pharmacy, Patient to call before getting OOB, Teach patient to arise slowly, Bed/chair exit alarm    Elimination Interventions: Call light in reach, Patient to call for help with toileting needs, Toilet paper/wipes in reach, Toileting schedule/hourly rounds    History of Falls Interventions: Bed/chair exit alarm, Consult care management for discharge planning, Door open when patient unattended, Evaluate medications/consider consulting pharmacy, Investigate reason for fall         Problem: Patient Education: Go to Patient Education Activity  Goal: Patient/Family Education  Outcome: Progressing Towards Goal     Problem: Pressure Injury - Risk of  Goal: *Prevention of pressure injury  Description: Document Jaime Scale and appropriate interventions in the flowsheet.   Outcome: Progressing Towards Goal  Note: Pressure Injury Interventions:  Sensory Interventions: Assess changes in LOC    Moisture Interventions: Internal/External urinary devices    Activity Interventions: Increase time out of bed, PT/OT evaluation, Pressure redistribution bed/mattress(bed type)    Mobility Interventions: HOB 30 degrees or less, Pressure redistribution bed/mattress (bed type)    Nutrition Interventions: Document food/fluid/supplement intake, Offer support with meals,snacks and hydration    Friction and Shear Interventions: HOB 30 degrees or less, Minimize layers                Problem: Patient Education: Go to Patient Education Activity  Goal: Patient/Family Education  Outcome: Progressing Towards Goal     Problem: Heart Failure: Day 5  Goal: Off Pathway (Use only if patient is Off Pathway)  Outcome: Progressing Towards Goal  Goal: Activity/Safety  Outcome: Progressing Towards Goal  Goal: Diagnostic Test/Procedures  Outcome: Progressing Towards Goal  Goal: Nutrition/Diet  Outcome: Progressing Towards Goal  Goal: Discharge Planning  Outcome: Progressing Towards Goal  Goal: Medications  Outcome: Progressing Towards Goal  Goal: Respiratory  Outcome: Progressing Towards Goal  Goal: Treatments/Interventions/Procedures  Outcome: Progressing Towards Goal  Goal: Psychosocial  Outcome: Progressing Towards Goal     Problem: Diabetes Self-Management  Goal: *Disease process and treatment process  Description: Define diabetes and identify own type of diabetes; list 3 options for treating diabetes. Outcome: Progressing Towards Goal  Goal: *Incorporating nutritional management into lifestyle  Description: Describe effect of type, amount and timing of food on blood glucose; list 3 methods for planning meals. Outcome: Progressing Towards Goal  Goal: *Incorporating physical activity into lifestyle  Description: State effect of exercise on blood glucose levels. Outcome: Progressing Towards Goal  Goal: *Developing strategies to promote health/change behavior  Description: Define the ABC's of diabetes; identify appropriate screenings, schedule and personal plan for screenings. Outcome: Progressing Towards Goal  Goal: *Using medications safely  Description: State effect of diabetes medications on diabetes; name diabetes medication taking, action and side effects. Outcome: Progressing Towards Goal  Goal: *Monitoring blood glucose, interpreting and using results  Description: Identify recommended blood glucose targets  and personal targets.   Outcome: Progressing Towards Goal  Goal: *Prevention, detection, treatment of acute complications  Description: List symptoms of hyper- and hypoglycemia; describe how to treat low blood sugar and actions for lowering  high blood glucose level. Outcome: Progressing Towards Goal  Goal: *Prevention, detection and treatment of chronic complications  Description: Define the natural course of diabetes and describe the relationship of blood glucose levels to long term complications of diabetes.   Outcome: Progressing Towards Goal  Goal: *Developing strategies to address psychosocial issues  Description: Describe feelings about living with diabetes; identify support needed and support network  Outcome: Progressing Towards Goal     Problem: Breathing Pattern - Ineffective  Goal: *Use of effective breathing techniques  Outcome: Progressing Towards Goal  Goal: *PALLIATIVE CARE:  Alleviation of Dyspnea  Outcome: Progressing Towards Goal     Problem: Patient Education: Go to Patient Education Activity  Goal: Patient/Family Education  Outcome: Progressing Towards Goal     Problem: High Risk or History of JED  Goal: Recognition of JED or High Risk for JED  Outcome: Progressing Towards Goal  Goal: Maintain Patent Airway and Adequate Oxygenation  Outcome: Progressing Towards Goal  Goal: Avoid Over-sedation  Outcome: Progressing Towards Goal  Goal: Maintenance Care of JED  Outcome: Progressing Towards Goal     Problem: Patient Education: Go to Patient Education Activity  Goal: Patient/Family Education  Outcome: Progressing Towards Goal

## 2021-04-29 NOTE — PROGRESS NOTES
[address] [facility] Adult  Hospitalist Group Hospitalist Progress Note Misty Jones MD 
Answering service: [phone] OR [phone] from in house phone NAME:  Vivien Arriaza :  1950 MRN:  [id number] Admission Summary:  
Vivien Arriaza is a 78 y.o. female with a past medical history of heart failure s/p LVAD, COPD, CKD stage 3, pulmonary HTN, Depression, HTN, and HLD who presented to F appointment with complaints of chest tightness and increasing dyspnea. She was instructed to come to [facility] for admission for further treatment. At time of examination patient experiencing dyspnea but states she's feeling 100% better than what she felt yesterday. Interval history / Subjective:  
Patient seen and examined. Feels well, stable off O2. No acute events overnight Assessment & Plan:  
 
 Acute on Chronic systolic congestive heart failure , s/p LVAD 
- NYHA class IV ;  s/p LVAD heartMate 2 , increased speed per HF team  
- Advanced heart failure team following and adjusting meds , anticoagulation with warfarin [facility]-  to measure heart pressures  
  
COPD-  Stopped nebs- resume home inhalers- symbicort and albuterol Severe tremors- unclear etiology- stopped nebs and thyroid med Improved after blood transfusion and dialysis,  
neurology eval completed- neurontin discontinued Subclinical hypothyroidism- normal free T4 with minimally elevated TSH Do not recommend any treatment or thyroid meds for these labs- DC synthroid Constipation with KUB suggesting fecal impaction- resolved after suppository CKD stage 3 - advanced to ESRD- now on HD. Nephrology following  
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  
JED -  home trilogy, BiPAP inpatient if patient able to tolerate this Depression - cont Paroxetine H/o breast cancer (1992) · s/p bilateral mastectomy/chemo and endometrial cancer s/p hysterectomy Pancytopenia- stable but persists Etiology unknown at this point - Hematology consulted for eval and tx 
 transfused PRBC this admission Code status: full DVT prophylaxis: warfarin- therapeutic INR Care Plan discussed with: Patient/Family Anticipated Disposition: Home w/Family Anticipated Discharge: next week- looking for rehab- maybe near [facility] that can manage LVAD, trilogy, and dialysis if needed Hospital Problems  Date Reviewed: 4/25/2021 Codes Class Noted POA Dyspnea ICD-10-CM: R06.00 
ICD-9-CM: 786.09  4/16/2021 Yes [facility] ICD-10-CM: O25.9 ICD-9-CM: 286.9  11/10/2020 Yes Open wound of left lower leg ICD-10-CM: F27.670B ICD-9-CM: 891.0  11/10/2020 Yes Anemia ICD-10-CM: D64.9 ICD-9-CM: 285.9  11/9/2020 Yes NICM (nonischemic cardiomyopathy) ([facility] ICD-10-CM: I42.8 ICD-9-CM: 425.4  11/4/2020 Yes Coronary artery disease involving native coronary artery of native heart without angina pectoris ICD-10-CM: I25.10 ICD-9-CM: 414.01  11/4/2020 Yes Chronic venous insufficiency ICD-10-CM: I87.2 ICD-9-CM: 459.81  11/4/2020 Yes Acute on chronic systolic CHF (congestive heart failure) (HCC) ICD-10-CM: M43.28 ICD-9-CM: 428.23, 428.0  10/27/2020 Yes Obesity, morbid ([facility] ICD-10-CM: E66.01 
ICD-9-CM: 278.01  10/1/2020 Yes Review of Systems: A comprehensive review of systems was negative except for that written in the HPI. Vital Signs:  
 Last 24hrs VS reviewed since prior progress note. Most recent are: 
Visit Vitals BP (!) 80/0 Pulse 76 Temp 98 °F (36.7 °C) Resp 18 Ht 5' 7\" (1.702 m) Wt 114.4 kg (252 lb 3.3 oz) SpO2 95% BMI 39.50 kg/m² Patient Vitals for the past 24 hrs: 
 Temp Pulse Resp SpO2  
04/29/21 0859 98 °F (36.7 °C) 76 18 95 % 04/29/21 0732    95 % 04/29/21 0422 98 °F (36.7 °C) 76 17 94 % 04/29/21 0251    95 % 04/29/21 0002    95 % 04/28/21 2355 98.9 °F (37.2 °C) 89 26 95 % 04/28/21 2339 98.9 °F (37.2 °C) 86 24 95 % 04/28/21 2133    95 % 04/28/21 2058 98.2 °F (36.8 °C) 78 17 95 % 04/28/21 2022 98.3 °F (36.8 °C) 80 19 95 % 04/28/21 1700 97.9 °F (36.6 °C) 78 23   
04/28/21 1645  74 24 94 % 04/28/21 1630  71 27   
04/28/21 1615  74 18 94 % 04/28/21 1600  72 25 95 % 04/28/21 1545  73 22   
04/28/21 1530  72 22 95 % 04/28/21 1515  75 28 94 % 04/28/21 1500 97.9 °F (36.6 °C) 77 19 95 % 04/28/21 1445  74 22   
04/28/21 1430  74 24   
04/28/21 1415  75 19   
04/28/21 1400 97.9 °F (36.6 °C) 79 21 94 % 04/28/21 1300  80    
04/28/21 1259  80    
04/28/21 1115 98.1 °F (36.7 °C) 87 21 92 % Intake/Output Summary (Last 24 hours) at 4/29/2021 1040 Last data filed at 4/29/2021 8253 Gross per 24 hour Intake 580 ml Output 1210 ml Net -630 ml Physical Examination:  
     
Constitutional: NAD, awake and alert , obese, [employer]   
   
Resp: Decreased breath sounds, no wheezing, no creps. No accessory muscle use CV:  audible LVAD motor GI:  Soft, non distended, non tender. BS +, no hepatosplenomegaly Musculoskeletal:  mild-mod edema, warm, wounds on bilat LE Neurologic:  Moves all extremities. AAOx3, diffuse mild tremors in upper and lower extremities. Data Review:  
 Review and/or order of clinical lab test 
XR CHEST PORT 
  
INDICATION: Possible aspiration 
  
COMPARISON: 4/26/2021 at 1406 hours 
  
TECHNIQUE: Portable AP upright chest view at 0950 hours 
  
FINDINGS: The support devices are stable. There is stable cardiac silhouette 
enlargement. The pulmonary vasculature is within normal limits.  
  
The lungs and pleural spaces are clear. There is no pneumothorax.  The bones and 
upper abdomen are stable. 
  
IMPRESSION 
  
Clear lungs. Stable cardiac silhouette enlargement. 
  
 
Labs:  
 
Recent Labs  
  04/29/21 
0017 04/28/21 0258 WBC 3.6 3.0* HGB 8.9* 8.3* HCT 30.4* 28.5*  
 137* Recent Labs  
  04/29/21 
0017 04/28/21 
0258 04/27/21 
2217 04/27/21 
7040 04/27/21 
7561  138  --  138 140  
K 4.0 3.8 3.8 3.9 3.8  104  --  101 104 CO2 30 30  --  33* 30  
BUN 20 33*  --  56* 55* CREA 2.04* 2.25*  --  3.16* 3.03* * 79  --  101* 62*  
CA 8.4* 8.7  --  8.9 9.0 MG 2.2 2.3  --   --  2.6* PHOS 1.8* 2.2*  --  3.5 3.5 Recent Labs  
  04/29/21 
0017 04/28/21 
0258 04/27/21 
4640 04/27/21 
0087 ALT 10* 9*  --  10* AP 59 52  --  47  
TBILI 0.7 0.6  --  0.7 TP 7.8 7.5  --  7.6 ALB 3.4* 3.3* 3.6 3.4*  
GLOB 4.4* 4.2*  --  4.2* Recent Labs  
  04/29/21 
0017 04/28/21 
0258 04/27/21 
7661 INR 2.3* 2.1* 1.8* PTP 23.4* 21.3* 18.1* Recent Labs  
  04/27/21 
8773 TIBC 213* PSAT 10* FERR 150 Lab Results Component Value Date/Time Folate 10.0 10/28/2020 03:56 AM  
  
No results for input(s): PH, PCO2, PO2 in the last 72 hours. No results for input(s): CPK, CKNDX, TROIQ in the last 72 hours. No lab exists for component: CPKMB Lab Results Component Value Date/Time Cholesterol, total 129 04/16/2021 10:40 AM  
 HDL Cholesterol 33 04/16/2021 10:40 AM  
 LDL, calculated 74 04/16/2021 10:40 AM  
 Triglyceride 110 04/16/2021 10:40 AM  
 CHOL/HDL Ratio 3.9 04/16/2021 10:40 AM  
 
Lab Results Component Value Date/Time Glucose (POC) 99 04/29/2021 06:05 AM  
 Glucose (POC) 147 (H) 04/28/2021 08:58 PM  
 Glucose (POC) 99 04/28/2021 04:49 PM  
 Glucose (POC) 138 (H) 04/28/2021 11:17 AM  
 Glucose (POC) 79 04/28/2021 06:37 AM  
 
Lab Results Component Value Date/Time  Color YELLOW/STRAW 04/18/2021 05:08 PM  
 Appearance CLEAR 04/18/2021 05:08 PM  
 Specific gravity 1.016 04/18/2021 05:08 PM  
 Specific gravity 1.013 11/09/2020 12:05 AM  
 pH (UA) 6.5 04/18/2021 05:08 PM  
 Protein 30 (A) 04/18/2021 05:08 PM  
 Glucose Negative 04/18/2021 05:08 PM  
 Ketone Negative 04/18/2021 05:08 PM  
 Bilirubin Negative 04/18/2021 05:08 PM  
 Urobilinogen 0.2 04/18/2021 05:08 PM  
 Nitrites Negative 04/18/2021 05:08 PM  
 Leukocyte Esterase SMALL (A) 04/18/2021 05:08 PM  
 Epithelial cells FEW 04/18/2021 05:08 PM  
 Bacteria Negative 04/18/2021 05:08 PM  
 WBC 0-4 04/18/2021 05:08 PM  
 RBC 0-5 04/18/2021 05:08 PM  
 
 
 
Medications Reviewed:  
 
Current Facility-Administered Medications Medication Dose Route Frequency  warfarin (COUMADIN) tablet 3 mg  3 mg Oral ONCE  
 iron sucrose (VENOFER) 200 mg in 0.9% sodium chloride 100 mL IVPB  200 mg IntraVENous DAILY  bisacodyL (DULCOLAX) suppository 10 mg  10 mg Rectal DAILY PRN  
 benzocaine-zinc cl-benzalkonium cl (ORAJEL) 20-0.1-0.02 % mucosal gel   Oral PRN  
 lactulose (CHRONULAC) 10 gram/15 mL solution 30 mL  20 g Oral DAILY  heparin (porcine) 1,000 unit/mL injection 1,100 Units  1,100 Units InterCATHeter DIALYSIS PRN And  
 heparin (porcine) 1,000 unit/mL injection 1,400 Units  1,400 Units InterCATHeter DIALYSIS PRN  
 glucose chewable tablet 16 g  4 Tab Oral PRN  
 dextrose (D50W) injection syrg 12.5-25 g  12.5-25 g IntraVENous PRN  
 glucagon (GLUCAGEN) injection 1 mg  1 mg IntraMUSCular PRN  
 0.9% sodium chloride infusion 250 mL  250 mL IntraVENous PRN  
 budesonide-formoteroL (SYMBICORT) 160-4.5 mcg/actuation HFA inhaler 1 Puff (Patient Supplied)  1 Puff Inhalation BID RT  
 bumetanide (BUMEX) injection 2 mg  2 mg IntraVENous TID  potassium chloride SR (KLOR-CON 10) tablet 20 mEq  20 mEq Oral DAILY  albuterol (PROVENTIL HFA, VENTOLIN HFA, PROAIR HFA) inhaler 2 Puff (Patient Supplied)  2 Puff Inhalation Q4H PRN  
 epoetin amalia-epbx (RETACRIT) injection 20,000 Units  20,000 Units SubCUTAneous Q MON, WED & FRI  
 oxymetazoline (AFRIN) 0.05 % nasal spray 2 Spray  2 Spray Both Nostrils BID PRN  
 sodium chloride (OCEAN) 0.65 % nasal squeeze bottle 2 Spray  2 Spray Both Nostrils Q2H PRN  therapeutic multivitamin (THERAGRAN) tablet 1 Tab  1 Tab Oral DAILY  polyethylene glycol (MIRALAX) packet 17 g  17 g Oral DAILY  isosorbide mononitrate ER (IMDUR) tablet 30 mg  30 mg Oral DAILY  cephALEXin (KEFLEX) capsule 250 mg  250 mg Oral BID  sodium chloride (NS) flush 5-40 mL  5-40 mL IntraVENous Q8H  
 sodium chloride (NS) flush 5-40 mL  5-40 mL IntraVENous PRN  
 acetaminophen (TYLENOL) tablet 650 mg  650 mg Oral Q4H PRN  
 hydrALAZINE (APRESOLINE) 20 mg/mL injection 10 mg  10 mg IntraVENous Q4H PRN  
 hydrALAZINE (APRESOLINE) tablet 100 mg  100 mg Oral TID  hydrOXYzine HCL (ATARAX) tablet 10 mg  10 mg Oral TID PRN  
 insulin glargine (LANTUS) injection 20 Units  20 Units SubCUTAneous BID  PARoxetine (PAXIL) tablet 20 mg  20 mg Oral DAILY  ranolazine ER (RANEXA) tablet 1,000 mg  1,000 mg Oral BID  tafamidis cap 61 mg (Patient Supplied)  61 mg Oral DAILY  warfarin dosing per pharmacy   Other Rx Dosing/Monitoring  miconazole (MICOTIN) 2 % powder   Topical BID  
 
______________________________________________________________________ EXPECTED LENGTH OF STAY: 5d 7h 
ACTUAL LENGTH OF STAY:          Leslie Rucker MD

## 2021-04-29 NOTE — PROGRESS NOTES
Problem: Mobility Impaired (Adult and Pediatric)  Goal: *Acute Goals and Plan of Care (Insert Text)  Description: FUNCTIONAL STATUS PRIOR TO ADMISSION: Patient was modified independent using a rollator for functional mobility. HOME SUPPORT PRIOR TO ADMISSION: The patient lived alone with daughter to provide assistance. Will be staying with daughter once discharged. Physical Therapy Goals  Revised 4/26/2021  1. Patient will move from supine to sit and sit to supine , scoot up and down, and roll side to side in bed with minimal assistance/contact guard assist within 7 day(s). 2.  Patient will transfer from bed to chair and chair to bed with minimal assistance/contact guard assist using the least restrictive device within 7 day(s). 3.  Patient will perform sit to stand with minimal assistance/contact guard assist within 7 day(s). 4.  Patient will sit at EOB with supervision for balance engaged in task for 5 minutes within 7 day(s). Physical Therapy Goals  Initiated 4/17/2021  1. Patient will move from supine to sit and sit to supine  and scoot up and down in bed with modified independence within 7 day(s). 2.  Patient will transfer from bed to chair and chair to bed with modified independence using the least restrictive device within 7 day(s). 3.  Patient will perform sit to stand with modified independence within 7 day(s). 4.  Patient will ambulate with modified independence for 150 feet with the least restrictive device within 7 day(s). Outcome: Progressing Towards Goal     PHYSICAL THERAPY TREATMENT  Patient: Vivien Arriaza (42 y.o. female)  Date: 4/29/2021  Diagnosis: Dyspnea [R06.00] <principal problem not specified>  Procedure(s) (LRB):  RIGHT HEART CATH (N/A) 9 Days Post-Op  Precautions: (LVAD)  Chart, physical therapy assessment, plan of care and goals were reviewed. ASSESSMENT  Patient continues with skilled PT services and is progressing towards goals.  Pt was able to increase gait tolerance and decrease assist level. No tremors noted today. Pt reports improved stability with LE. Pt demonstrated the ability to self check LVAD alarm, connect/disconnect battery to clip. Connect and disconnect power leads. Current Level of Function Impacting Discharge (mobility/balance): mod A     Other factors to consider for discharge: decrease independence and mobility          PLAN :  Patient continues to benefit from skilled intervention to address the above impairments. Continue treatment per established plan of care. to address goals. Recommendation for discharge: (in order for the patient to meet his/her long term goals)  Therapy 3 hours per day 5-7 days per week if declines then HHPT with assistance     This discharge recommendation:  Has not yet been discussed the attending provider and/or case management    IF patient discharges home will need the following DME: patient owns DME required for discharge- rollator need replacement pt and pt's family aware. OOP cost       SUBJECTIVE:   Patient stated I am better.     OBJECTIVE DATA SUMMARY:   Critical Behavior:  Neurologic State: Alert  Orientation Level: Oriented X4  Cognition: Appropriate decision making, Appropriate for age attention/concentration, Appropriate safety awareness  Safety/Judgement: Decreased insight into deficits, Decreased awareness of need for safety  Functional Mobility Training:  Bed Mobility:     Supine to Sit: Moderate assistance              Transfers:  Sit to Stand: Minimum assistance  Stand to Sit: Minimum assistance                             Balance:  Sitting: Impaired  Sitting - Static: Good (unsupported)  Sitting - Dynamic: Fair (occasional)  Standing: Impaired  Standing - Static: Fair  Standing - Dynamic : Fair  Ambulation/Gait Training:  Distance (ft): 12 Feet (ft)(x2)  Assistive Device: Gait belt;Walker, rollator  Ambulation - Level of Assistance: Contact guard assistance        Gait Abnormalities: Decreased step clearance        Base of Support: Center of gravity altered; Widened        Step Length: Left shortened;Right shortened                    Stairs: Therapeutic Exercises:     Pain Rating:  No complaints     Activity Tolerance:   Improving     After treatment patient left in no apparent distress:   Sitting in chair, Call bell within reach, Bed / chair alarm activated, and Caregiver / family present    COMMUNICATION/COLLABORATION:   The patients plan of care was discussed with: Registered nurse.      Arabella Zavaleta PTA   Time Calculation: 25 mins

## 2021-04-29 NOTE — PROGRESS NOTES
Pharmacist Note - Warfarin Dosing  Consult provided for this 78 y. o.female to manage warfarin for LVAD    INR Goal: 1.8-2.5 per NP (lowered due to GIB)    Home regimen/ tablet size: 4 mg daily     Drugs that may increase INR: None  Drugs that may decrease INR: None  Other current anticoagulants/ drugs that may increase bleeding risk: None  Risk factors: Age > 65  Daily INR ordered: YES    Recent Labs     04/29/21  0017 04/28/21  0258 04/27/21  0517   HGB 8.9* 8.3* 8.0*   INR 2.3* 2.1* 1.8*     Date               INR                  Dose  4/16  1.6  4 mg    4/17  1.8  4 mg   4/18  1.8  4 mg   4/19  1.8  4 mg   4/20                 2.0                  4 mg  4/21                 2.1                  4 mg  4/22                 2.2                  4 mg  4/23                 2.6                  2.5 mg  4/24  2.7  2.5 mg  4/25  2.5  2.5 mg  4/26                1.8                   4 mg    4/27                1.8                   4 mg   4/28  2.1  3 mg  4/29  2.3  3 mg                                                                                                                                                        Assessment/ Plan: Will order warfarin 3 mg PO x 1 dose. Pharmacy will continue to monitor daily and adjust therapy as indicated. Please contact the pharmacist at x [phone] for outpatient recommendations if needed.

## 2021-04-29 NOTE — PROGRESS NOTES
Transitions of Care Plan:  RUR:  41%  Clinical Plan: HD - plan for tomorrow if needed; possible OP HD set up; therapy  Consults: Nephrology; [facility]; Therapy; Hem/Onc  Baseline: ambulates with rollator; LVAD; resides alone; open w [address] [facility]  Disposition: daughter's home in [facility]; poss OP HD; home health vs IPR    CM updated by treatment team today - will know by Monday if patient needs OP HD set up. CM to begin process of setting up patient with outpatient HD clinic that is LVAD certified in [place], [place]. Therapy also recommending IPR vs HH with assistance. CM spoke with patient this afternoon. Daughter is currently CATA in a work meeting. Patient states she is willing to go wherever it makes it easier for her daughter. Agreeable to go to [place] to reside with her daughter. Prefers home health service set up over IPR. Patient states [facility] will not accept patient due to her trilogy machine. CM will inquire with [facility] if IPR needed at discharge. Patient owns the following DME:  Mare Creamer, rollator, wheelchair, and trilogy. CM will speak with patient's daughter tomorrow when available. Do not anticipate discharge until next week. CM called supervisor with [facility] outpatient admissions - will inquire into clinics into [place], [place] that are LVAD certified. CM to call [facility] tomorrow to check in.    1450 - Provided update to patient's daughter - confirmed plan for patient to re-locate to [place], [place] to her daughter's home ([address], [facility], [address]). CM working on [employer] and OP HD clinic near [facility].     Luis Fernando Barraza, MPH  Care Manager [facility]  Available via [employer] or

## 2021-04-29 NOTE — PROGRESS NOTES
[facility] 
 [address] [facility] [facility] [facility] Phone: [phone]  
  
Nephrology Progress Note 
Ki Hsu     1950     [id number] Date of Admission : 4/16/2021 04/29/21 CC: Follow up for ROCIO Assessment and Plan ROCIO on CKD: 
- likely 2/2 progressive CKD and CRS 
- dialysis initiated 4/26 
- next HD planned tomorrow if needed 
- cont IV diuretics for now 
- daily labs and strict I/Os 
- not a great long-term HD candidate and she may not even tolerate HD well in the short term- discussed at length with daughter and she wishes to proceed with dialysis at this time Asterixis and encephalopathy 
- appreciate neurology 
- all offending meds off 
- improving with HD so far CKD stage III. - baseline around 2 
- presumed 2/2 DM, HTN. 
- Bone marrow Bx not done. - Renal US appearance of Kidneys not suggestive of Amyloidosis  
  
Chronic systolic CHF, stage C NYHA class IV 
-Combined ischemic and nonischemic cardiomyopathy 
-S/p HM 2 LVAD implant 4/15/2017 by Dr. Cathy Cabrera at [facility] 
- hx of recurrent VT : off mexiletine - chronic RV failure. 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 Chronic anemia :  
+ve PYP testing.  
- anemia of chronic disease  
- continue HEIKE Leucopenia, Thrombocytopenia   
Severe COPD. Pulmonary hypertension. Chronic A. fib. History of endometrial Ca ATTR amyloidosis Interval History: 
Seen and examined. Tolerating HD. Tremors and encephalopathy resolved. Oliguric. No cp, sob, n/v/d reported. Review of Systems: A comprehensive review of systems was negative except for that written in the HPI. Current Medications:  
Current Facility-Administered Medications Medication Dose Route Frequency  warfarin (COUMADIN) tablet 3 mg  3 mg Oral ONCE  
 iron sucrose (VENOFER) 200 mg in 0.9% sodium chloride 100 mL IVPB  200 mg IntraVENous DAILY  bisacodyL (DULCOLAX) suppository 10 mg  10 mg Rectal DAILY PRN  
 benzocaine-zinc cl-benzalkonium cl (ORAJEL) 20-0.1-0.02 % mucosal gel   Oral PRN  
 lactulose (CHRONULAC) 10 gram/15 mL solution 30 mL  20 g Oral DAILY  heparin (porcine) 1,000 unit/mL injection 1,100 Units  1,100 Units InterCATHeter DIALYSIS PRN And  
 heparin (porcine) 1,000 unit/mL injection 1,400 Units  1,400 Units InterCATHeter DIALYSIS PRN  
 glucose chewable tablet 16 g  4 Tab Oral PRN  
 dextrose (D50W) injection syrg 12.5-25 g  12.5-25 g IntraVENous PRN  
 glucagon (GLUCAGEN) injection 1 mg  1 mg IntraMUSCular PRN  
 0.9% sodium chloride infusion 250 mL  250 mL IntraVENous PRN  
 budesonide-formoteroL (SYMBICORT) 160-4.5 mcg/actuation HFA inhaler 1 Puff (Patient Supplied)  1 Puff Inhalation BID RT  
 bumetanide (BUMEX) injection 2 mg  2 mg IntraVENous TID  potassium chloride SR (KLOR-CON 10) tablet 20 mEq  20 mEq Oral DAILY  albuterol (PROVENTIL HFA, VENTOLIN HFA, PROAIR HFA) inhaler 2 Puff (Patient Supplied)  2 Puff Inhalation Q4H PRN  
 epoetin amalia-epbx (RETACRIT) injection 20,000 Units  20,000 Units SubCUTAneous Q MON, WED & FRI  
 oxymetazoline (AFRIN) 0.05 % nasal spray 2 Spray  2 Spray Both Nostrils BID PRN  
 sodium chloride (OCEAN) 0.65 % nasal squeeze bottle 2 Spray  2 Spray Both Nostrils Q2H PRN  therapeutic multivitamin (THERAGRAN) tablet 1 Tab  1 Tab Oral DAILY  polyethylene glycol (MIRALAX) packet 17 g  17 g Oral DAILY  isosorbide mononitrate ER (IMDUR) tablet 30 mg  30 mg Oral DAILY  cephALEXin (KEFLEX) capsule 250 mg  250 mg Oral BID  sodium chloride (NS) flush 5-40 mL  5-40 mL IntraVENous Q8H  
 sodium chloride (NS) flush 5-40 mL  5-40 mL IntraVENous PRN  
 acetaminophen (TYLENOL) tablet 650 mg  650 mg Oral Q4H PRN  
 hydrALAZINE (APRESOLINE) 20 mg/mL injection 10 mg  10 mg IntraVENous Q4H PRN  
 hydrALAZINE (APRESOLINE) tablet 100 mg  100 mg Oral TID  hydrOXYzine HCL (ATARAX) tablet 10 mg  10 mg Oral TID PRN  
 insulin glargine (LANTUS) injection 20 Units  20 Units SubCUTAneous BID  PARoxetine (PAXIL) tablet 20 mg  20 mg Oral DAILY  ranolazine ER (RANEXA) tablet 1,000 mg  1,000 mg Oral BID  tafamidis cap 61 mg (Patient Supplied)  61 mg Oral DAILY  warfarin dosing per pharmacy   Other Rx Dosing/Monitoring  miconazole (MICOTIN) 2 % powder   Topical BID Allergies Allergen Reactions  Benzodiazepines Other (comments) Respiratory issues Objective: 
Vitals:  
Vitals:  
 04/29/21 8820 32/72/04 5068 04/29/21 0732 04/29/21 9348 Pulse: 76   76 Resp: 17   18 Temp: 98 °F (36.7 °C)   98 °F (36.7 °C) TempSrc:      
SpO2: 94%  95% 95% Weight:  114.4 kg (252 lb 3.3 oz) Height:      
 
Intake and Output: 
04/29 0701 - 04/29 1900 In: -  
Out: 100 [Urine:100] 04/27 1901 - 04/29 0700 In: 890 [P.O.:920] Out: 1410 [Urine:410] Physical Examination: 
General: Morbidly obese, in NAD Jairo  Neck: Supple,no mass palpable Lungs : CTA  
CVS: RRR, VAD sounds Abdomen: Soft, NT, BS +, obese Extremities: LE discoloration, LUE lymphedema Neurologic: Awake, alert, minimal tremors now Access: Deena Pinna in place  : sahu 
  
 
[]    High complexity decision making was performed 
[]    Patient is at high-risk of decompensation with multiple organ involvement Lab Data Personally Reviewed: I have reviewed all the pertinent labs, microbiology data and radiology studies during assessment. Recent Labs  
  04/29/21 
0017 04/28/21 
0258 04/27/21 
2217 04/27/21 
7517 04/27/21 
0517 04/26/21 
1711  138  --  138 140 138  
K 4.0 3.8 3.8 3.9 3.8 4.3  104  --  101 104 99 CO2 30 30  --  33* 30 32 * 79  --  101* 62* 86 BUN 20 33*  --  56* 55* 77* CREA 2.04* 2.25*  --  3.16* 3.03* 4.15* CA 8.4* 8.7  --  8.9 9.0 9.2 MG 2.2 2.3  --   --  2.6* 2.7* PHOS 1.8* 2.2*  --  3.5 3.5  --   
ALB 3.4* 3.3*  --  3.6 3.4*  --   
ALT 10* 9*  --   --  10*  --   
INR 2.3* 2.1*  --   --  1.8*  --   
 
Recent Labs  
  04/29/21 
0017 04/28/21 
0258 04/27/21 
1452 WBC 3.6 3.0* 3.4* HGB 8.9* 8.3* 8.0*  
HCT 30.4* 28.5* 27.6*  
 137* 125* No results found for: SDES Lab Results Component Value Date/Time Culture result: NO GROWTH 5 DAYS 04/23/2021 06:51 PM  
 Culture result: NO GROWTH 5 DAYS 11/10/2020 06:42 PM  
 Culture result: No Growth (<1000 cfu/mL) 11/09/2020 12:05 AM  
 Culture result: SCANT Morganella morganii ssp morganii (A) 10/30/2020 11:30 AM  
 Culture result: SCANT Morganella morganii ssp morganii (A) 10/30/2020 11:30 AM  
 Culture result: RARE DIPHTHEROIDS (A) 10/30/2020 11:30 AM  
 
Recent Results (from the past 24 hour(s)) GLUCOSE, POC Collection Time: 04/28/21  4:49 PM  
Result Value Ref Range Glucose (POC) 99 65 - 100 mg/dL Performed by [facility] GLUCOSE, POC Collection Time: 04/28/21  8:58 PM  
Result Value Ref Range Glucose (POC) 147 (H) 65 - 100 mg/dL Performed by Aracelis Rosales Collection Time: 04/29/21 12:16 AM  
Result Value Ref Range Ammonia 26 <32 UMOL/L  
PROTHROMBIN TIME + INR Collection Time: 04/29/21 12:17 AM  
Result Value Ref Range INR 2.3 (H) 0.9 - 1.1 Prothrombin time 23.4 (H) 9.0 - 11.1 sec NT-PRO BNP Collection Time: 04/29/21 12:17 AM  
Result Value Ref Range NT pro-BNP 9,994 (H) <125 PG/ML  
MAGNESIUM Collection Time: 04/29/21 12:17 AM  
Result Value Ref Range Magnesium 2.2 1.6 - 2.4 mg/dL LD Collection Time: 04/29/21 12:17 AM  
Result Value Ref Range  81 - 246 U/L  
CBC WITH AUTOMATED DIFF Collection Time: 04/29/21 12:17 AM  
Result Value Ref Range WBC 3.6 3.6 - 11.0 K/uL  
 RBC 3.37 (L) 3.80 - 5.20 M/uL HGB 8.9 (L) 11.5 - 16.0 g/dL HCT 30.4 (L) 35.0 - 47.0 % MCV 90.2 80.0 - 99.0 FL  
 MCH 26.4 26.0 - 34.0 PG  
 MCHC 29.3 (L) 30.0 - 36.5 g/dL  
 RDW 18.6 (H) 11.5 - 14.5 % PLATELET 026 501 - 709 K/uL  MPV 9.2 8.9 - 12.9 FL  
 NRBC 0.0 0  WBC ABSOLUTE NRBC 0.00 0.00 - 0.01 K/uL NEUTROPHILS 78 (H) 32 - 75 % LYMPHOCYTES 11 (L) 12 - 49 % MONOCYTES 9 5 - 13 % EOSINOPHILS 0 0 - 7 % BASOPHILS 1 0 - 1 % IMMATURE GRANULOCYTES 1 (H) 0.0 - 0.5 % ABS. NEUTROPHILS 2.9 1.8 - 8.0 K/UL  
 ABS. LYMPHOCYTES 0.4 (L) 0.8 - 3.5 K/UL  
 ABS. MONOCYTES 0.3 0.0 - 1.0 K/UL  
 ABS. EOSINOPHILS 0.0 0.0 - 0.4 K/UL  
 ABS. BASOPHILS 0.0 0.0 - 0.1 K/UL  
 ABS. IMM. GRANS. 0.0 0.00 - 0.04 K/UL  
 DF SMEAR SCANNED    
 RBC COMMENTS ANISOCYTOSIS 1+ 
    
 RBC COMMENTS POLYCHROMASIA 1+ PHOSPHORUS Collection Time: 04/29/21 12:17 AM  
Result Value Ref Range Phosphorus 1.8 (L) 2.6 - 4.7 MG/DL  
METABOLIC PANEL, COMPREHENSIVE Collection Time: 04/29/21 12:17 AM  
Result Value Ref Range Sodium 137 136 - 145 mmol/L Potassium 4.0 3.5 - 5.1 mmol/L Chloride 100 97 - 108 mmol/L  
 CO2 30 21 - 32 mmol/L Anion gap 7 5 - 15 mmol/L Glucose 141 (H) 65 - 100 mg/dL BUN 20 6 - 20 MG/DL Creatinine 2.04 (H) 0.55 - 1.02 MG/DL  
 BUN/Creatinine ratio 10 (L) 12 - 20 GFR est AA 29 (L) >60 ml/min/1.73m2 GFR est non-AA 24 (L) >60 ml/min/1.73m2 Calcium 8.4 (L) 8.5 - 10.1 MG/DL Bilirubin, total 0.7 0.2 - 1.0 MG/DL  
 ALT (SGPT) 10 (L) 12 - 78 U/L  
 AST (SGOT) 17 15 - 37 U/L Alk. phosphatase 59 45 - 117 U/L Protein, total 7.8 6.4 - 8.2 g/dL Albumin 3.4 (L) 3.5 - 5.0 g/dL Globulin 4.4 (H) 2.0 - 4.0 g/dL A-G Ratio 0.8 (L) 1.1 - 2.2 GLUCOSE, POC Collection Time: 04/29/21  6:05 AM  
Result Value Ref Range Glucose (POC) 99 65 - 100 mg/dL Performed by Pretty HAJI   
GLUCOSE, POC Collection Time: 04/29/21 12:06 PM  
Result Value Ref Range Glucose (POC) 136 (H) 65 - 100 mg/dL Performed by Martha Rushing I have reviewed the flowsheets. Chart and Pertinent Notes have been reviewed. No change in PMH ,family and social history from Consult note.  
 
 
Ok Musa MD

## 2021-04-29 NOTE — PROGRESS NOTES
[facility] VAD/Heart Failure Progress Note    Admit Date: 2021  POD:  9 Days Post-Op    Procedure:  Procedure(s):  RIGHT HEART CATH        Subjective:   Tremors better on HD; still some itching; good flows     Objective:   Vitals:  Blood pressure (!) 80/0, pulse 86, temperature 98 °F (36.7 °C), resp. rate 22, height 5' 7\" (1.702 m), weight 252 lb 3.3 oz (114.4 kg), SpO2 94 %.   Temp (24hrs), Av.3 °F (36.8 °C), Min:97.9 °F (36.6 °C), Max:98.9 °F (37.2 °C)    Hemodynamics:   CO:     CI:     CVP:     SVR:     PAP Systolic:     PAP Diastolic:     PVR:     WJ33:     SCV02:      VAD Interrogation: LVAD (Heartmate)  Pump Speed (RPM): 9600  Pump Flow (LPM): 5.3  Chatter in Lines: No  PI (Pulsitility Index): 5.5  Power: 6.1  MAP: 90   Test: No  Back Up  at Bedside & Labeled: Yes  Power Module Test: No  Driveline Site Care: No  Driveline Dressing: Clean, Dry, and Intact    EKG/Rhythm:      Extubation Date / Time:     CT Output:     Ventilator:  Ventilator Volumes  Vt Spont (ml): 435 ml (21)  Ve Observed (l/min): 6.7 l/min (21 025)    Oxygen Therapy:  Oxygen Therapy  O2 Sat (%): 94 % (21 1105)  Pulse via Oximetry: 77 beats per minute (21 0732)  O2 Device: None (Room air) (21 1105)  Skin Assessment: Clean, dry, & intact (21)  Skin Protection for O2 Device: No (21)  O2 Flow Rate (L/min): 2 l/min (21 0300)  FIO2 (%): 21 % (21 0732)    CXR:    Admission Weight: Last Weight   Weight: 270 lb 6.4 oz (122.7 kg) Weight: 252 lb 3.3 oz (114.4 kg)     Intake / Output / Drain:  Current Shift:  0701 -  1900  In: 470 [P.O.:360; I.V.:110]  Out: 175 [Urine:175]  Last 24 hrs.:     Intake/Output Summary (Last 24 hours) at 2021 1405  Last data filed at 2021 1105  Gross per 24 hour   Intake 810 ml   Output 1310 ml   Net -500 ml           No results for input(s): CPK, CKMB, TROIQ in the last 72 hours.   Recent Labs     04/29/21  0017 04/28/21  0258 04/27/21  2217 04/27/21  0944 04/27/21  0517    138  --  138 140   K 4.0 3.8 3.8 3.9 3.8   CO2 30 30  --  33* 30   BUN 20 33*  --  56* 55*   CREA 2.04* 2.25*  --  3.16* 3.03*   * 79  --  101* 62*   PHOS 1.8* 2.2*  --  3.5 3.5   MG 2.2 2.3  --   --  2.6*   WBC 3.6 3.0*  --   --  3.4*   HGB 8.9* 8.3*  --   --  8.0*   HCT 30.4* 28.5*  --   --  27.6*    137*  --   --  125*     Recent Labs     04/29/21  0017 04/28/21  0258 04/27/21  0517   INR 2.3* 2.1* 1.8*   PTP 23.4* 21.3* 18.1*     No lab exists for component: PBNP        Current Facility-Administered Medications:     warfarin (COUMADIN) tablet 3 mg, 3 mg, Oral, ONCE, Festus Ribeiro MD    iron sucrose (VENOFER) 200 mg in 0.9% sodium chloride 100 mL IVPB, 200 mg, IntraVENous, DAILY, Braxton, Triny B, NP, Last Rate: 440 mL/hr at 04/29/21 1103, 200 mg at 04/29/21 1103    bisacodyL (DULCOLAX) suppository 10 mg, 10 mg, Rectal, DAILY PRN, Braxton, Triny B, NP, 10 mg at 04/28/21 1950    benzocaine-zinc cl-benzalkonium cl (ORAJEL) 20-0.1-0.02 % mucosal gel, , Oral, PRN, Edward Ortiz MD, Given at 04/27/21 2211    lactulose (CHRONULAC) 10 gram/15 mL solution 30 mL, 20 g, Oral, DAILY, Dilcia Walter NP, 30 mL at 04/29/21 0913    heparin (porcine) 1,000 unit/mL injection 1,100 Units, 1,100 Units, InterCATHeter, DIALYSIS PRN, 1,100 Units at 04/28/21 1655 **AND** heparin (porcine) 1,000 unit/mL injection 1,400 Units, 1,400 Units, InterCATHeter, DIALYSIS PRN, Yvonne Galvez MD, 1,400 Units at 04/28/21 1655    glucose chewable tablet 16 g, 4 Tab, Oral, PRN, Edward Ortiz MD    dextrose (D50W) injection syrg 12.5-25 g, 12.5-25 g, IntraVENous, PRN, Edward Ortiz MD, 25 g at 04/26/21 2246    glucagon (GLUCAGEN) injection 1 mg, 1 mg, IntraMUSCular, PRN, Edward Ortiz MD    0.9% sodium chloride infusion 250 mL, 250 mL, IntraVENous, PRN, Andrzej Macedo NP  [employer] budesonide-formoteroL (SYMBICORT) 160-4.5 mcg/actuation HFA inhaler 1 Puff (Patient Supplied), 1 Puff, Inhalation, BID RT, Dawson Campos MD, 1 Puff at 04/29/21 0731    bumetanide (BUMEX) injection 2 mg, 2 mg, IntraVENous, TID, Polliard, Joel Do T, NP, 2 mg at 04/29/21 0913    potassium chloride SR (KLOR-CON 10) tablet 20 mEq, 20 mEq, Oral, DAILY, Polliard, Joel Do T, NP, 20 mEq at 04/29/21 0913    albuterol (PROVENTIL HFA, VENTOLIN HFA, PROAIR HFA) inhaler 2 Puff (Patient Supplied), 2 Puff, Inhalation, Q4H PRN, Dawson Campos MD    epoetin amalia-epbx (RETACRIT) injection 20,000 Units, 20,000 Units, SubCUTAneous, Q MON, WED & Jaspreet Crawford MD, 20,000 Units at 04/28/21 2214    oxymetazoline (AFRIN) 0.05 % nasal spray 2 Spray, 2 Spray, Both Nostrils, BID PRN, Charan Walter NP, 2 Spray at 04/22/21 1800    sodium chloride (OCEAN) 0.65 % nasal squeeze bottle 2 Spray, 2 Spray, Both Nostrils, Q2H PRN, Charan Walter NP    therapeutic multivitamin (THERAGRAN) tablet 1 Tab, 1 Tab, Oral, DAILY, Charan Walter, NP, 1 Tab at 04/29/21 0913    polyethylene glycol (MIRALAX) packet 17 g, 17 g, Oral, DAILY, Polliard, [place] Do T, NP, 17 g at 04/29/21 0913    isosorbide mononitrate ER (IMDUR) tablet 30 mg, 30 mg, Oral, DAILY, Polliard, Joel Do T, NP, 30 mg at 04/29/21 0913    cephALEXin (KEFLEX) capsule 250 mg, 250 mg, Oral, BID, Dirk Cockayne, MD, 250 mg at 04/29/21 0913    sodium chloride (NS) flush 5-40 mL, 5-40 mL, IntraVENous, Q8H, Polliard, Dana T, NP, 10 mL at 04/29/21 0640    sodium chloride (NS) flush 5-40 mL, 5-40 mL, IntraVENous, PRN, Polliard, [place] Do T, NP, 10 mL at 04/22/21 1238    acetaminophen (TYLENOL) tablet 650 mg, 650 mg, Oral, Q4H PRN, Polliard, Dana T, NP, 650 mg at 04/29/21 1103    hydrALAZINE (APRESOLINE) 20 mg/mL injection 10 mg, 10 mg, IntraVENous, Q4H PRN, Polliard, Dana T, NP, 10 mg at 04/21/21 0332    hydrALAZINE (APRESOLINE) tablet 100 mg, 100 mg, Oral, TID, Ruth Carpenter NP, 100 mg at 04/29/21 3909    hydrOXYzine HCL (ATARAX) tablet 10 mg, 10 mg, Oral, TID PRN, Dillon Walter NP    insulin glargine (LANTUS) injection 20 Units, 20 Units, SubCUTAneous, BID, Dillon Walter NP, 20 Units at 04/29/21 0913    PARoxetine (PAXIL) tablet 20 mg, 20 mg, Oral, DAILY, Debbie Walter NP, 20 mg at 04/29/21 0913    ranolazine ER (RANEXA) tablet 1,000 mg, 1,000 mg, Oral, BID, Debbie Walter NP, 1,000 mg at 04/29/21 6011    tafamidis cap 61 mg (Patient Supplied), 61 mg, Oral, DAILY, Debbie Walter NP, 61 mg at 04/28/21 0900    warfarin dosing per pharmacy, , Other, Rx Dosing/Monitoring, Ruth Carpenter NP    miconazole (MICOTIN) 2 % powder, , Topical, BID, Shane Del Cid MD, Given at 04/29/21 1211    A/P     S/P LVAD - good flows  Need for [facility] - coumadin  Fluid overload - diuresis  Acute kidney injury - monitor      Risk of morbidity and mortality - high  Medical decision making - high complexity    Signed By: Maddy Gale MD

## 2021-04-29 NOTE — PROGRESS NOTES
1930: Bedside and Verbal shift change report given to Narda Handy RN (oncoming nurse) by Mabel Us RN (offgoing nurse). Report included the following information SBAR, Kardex, Intake/Output, MAR, Recent Results and Cardiac Rhythm Paced. 0600: Drive line dressing changed per order. 0730: Bedside and Verbal shift change report given to Neena Castaneda RN (oncoming nurse) by Narda Handy RN (offgoing nurse). Report included the following information SBAR, Kardex, Intake/Output, MAR, Recent Results and Cardiac Rhythm Paced.

## 2021-04-29 NOTE — PROGRESS NOTES
Heme/ONC lab fu  Wbc normal  Platelets normal  Anemia stable  Continue to follow  Call if any questions

## 2021-04-29 NOTE — PROGRESS NOTES
ID Progress Note  2021    Subjective:     SOB/HAIRSTON    Review of Systems:            Symptom Y/N Comments   Symptom Y/N Comments   Fever/Chills n      Chest Pain  n      Poor Appetite       Edema  y      Cough       Abdominal Pain        Sputum       Joint Pain        SOB/HAIRSTON y      Pruritis/Rash        Nausea/vomit  n     Tolerating PT/OT        Diarrhea  n     Tolerating Diet        Constipation  n     Other           Could NOT obtain due to:       Objective:     Vitals:   Visit Vitals  BP (!) 80/0   Pulse 90   Temp 98.1 °F (36.7 °C)   Resp 20   Ht 5' 7\" (1.702 m)   Wt 114.4 kg (252 lb 3.3 oz)   SpO2 94%   BMI 39.50 kg/m²        Tmax:  Temp (24hrs), Av.3 °F (36.8 °C), Min:97.9 °F (36.6 °C), Max:98.9 °F (37.2 °C)      PHYSICAL EXAM:  General: morbidly obese, Chronically ill appearing, Alert, cooperative, no acute distress    EENT:  EOMI. Anicteric sclerae. MMM  Resp:  Clear in apex with decreased breath sounds at bases, no wheezing or rales. No accessory muscle use  CV:  Regular  rhythm,  trace of pitting edema, LVAD hum  GI:  Soft, Non distended, Non tender. +Bowel sounds  Neurologic:  Alert and oriented X 3, normal speech,   Psych:   Good insight. Not anxious nor agitated  Skin:  No rashes.   No jaundice, Venous stasis    Labs:   Lab Results   Component Value Date/Time    WBC 3.6 2021 12:17 AM    HGB 8.9 (L) 2021 12:17 AM    HCT 30.4 (L) 2021 12:17 AM    PLATELET 739  12:17 AM    MCV 90.2 2021 12:17 AM     Lab Results   Component Value Date/Time    Sodium 137 2021 12:17 AM    Potassium 4.0 2021 12:17 AM    Chloride 100 2021 12:17 AM    CO2 30 2021 12:17 AM    Anion gap 7 2021 12:17 AM    Glucose 141 (H) 2021 12:17 AM    BUN 20 2021 12:17 AM    Creatinine 2.04 (H) 2021 12:17 AM    BUN/Creatinine ratio 10 (L) 2021 12:17 AM    GFR est AA 29 (L) 2021 12:17 AM    GFR est non-AA 24 (L) 2021 12:17 AM    Calcium 8.4 (L) 04/29/2021 12:17 AM    Bilirubin, total 0.7 04/29/2021 12:17 AM    Alk. phosphatase 59 04/29/2021 12:17 AM    Protein, total 7.8 04/29/2021 12:17 AM    Albumin 3.4 (L) 04/29/2021 12:17 AM    Globulin 4.4 (H) 04/29/2021 12:17 AM    A-G Ratio 0.8 (L) 04/29/2021 12:17 AM    ALT (SGPT) 10 (L) 04/29/2021 12:17 AM         Assessment and Plan   Elevated sed rate  - trending down 59 (4/18) from 71 on admission    Afebrile    Wbc 3.6    Continue with keflex      -> keflex was started as suppressive therapy for chronic sternal wound infection which has been completely healed. No active source of infection at this time. It is OK to stop the ABX if hematologist contribute leukopneia is secondary to beta lactam.     CBC with diff every other day has been ordered     Fever work up if temp >= 100.4     Pancytopenia  - hematologist has been consulted      Acute on chronic systolic heart failure  - cardiology following    ROCIO on CKD  - nephrology following    Pt's WBC remain stable. ID team signing off. Please contact us with any questions or concerns.      Oly Tompkins NP

## 2021-04-29 NOTE — PROGRESS NOTES
0730 Bedside and Verbal shift change report given to Kenney Castellano RN (oncoming nurse) by Carmelina Sultana RN (offgoing nurse). Report included the following information SBAR, Kardex, Intake/Output, MAR, Recent Results and Med Rec Status. 1910 18 beats of VT. Pt denied SOB, N/V, dizzy/lightheadedness. MD notified. No new orders received. 1500 [facility], NP, notified of VT. No new orders received. 1955 Bedside and Verbal shift change report given to Carmelina Sultana RN (oncoming nurse) by Kenney Castellano RN (offgoing nurse). Report included the following information SBAR, Kardex, Intake/Output, MAR, Recent Results and Med Rec Status. Problem: Falls - Risk of  Goal: *Absence of Falls  Description: Document Ivania Frost Fall Risk and appropriate interventions in the flowsheet. Outcome: Progressing Towards Goal  Note: Fall Risk Interventions:  Mobility Interventions: Bed/chair exit alarm, Communicate number of staff needed for ambulation/transfer, Patient to call before getting OOB         Medication Interventions: Evaluate medications/consider consulting pharmacy, Patient to call before getting OOB, Teach patient to arise slowly, Bed/chair exit alarm    Elimination Interventions: Call light in reach, Patient to call for help with toileting needs, Toilet paper/wipes in reach, Toileting schedule/hourly rounds    History of Falls Interventions: Bed/chair exit alarm, Consult care management for discharge planning, Door open when patient unattended, Evaluate medications/consider consulting pharmacy, Investigate reason for fall         Problem: Patient Education: Go to Patient Education Activity  Goal: Patient/Family Education  Outcome: Progressing Towards Goal     Problem: Pressure Injury - Risk of  Goal: *Prevention of pressure injury  Description: Document Jaime Scale and appropriate interventions in the flowsheet.   Outcome: Progressing Towards Goal  Note: Pressure Injury Interventions:  Sensory Interventions: Assess changes in LOC    Moisture Interventions: Internal/External urinary devices    Activity Interventions: Increase time out of bed, PT/OT evaluation, Pressure redistribution bed/mattress(bed type)    Mobility Interventions: HOB 30 degrees or less, Pressure redistribution bed/mattress (bed type)    Nutrition Interventions: Document food/fluid/supplement intake, Offer support with meals,snacks and hydration    Friction and Shear Interventions: HOB 30 degrees or less, Minimize layers                Problem: Patient Education: Go to Patient Education Activity  Goal: Patient/Family Education  Outcome: Progressing Towards Goal     Problem: Heart Failure: Discharge Outcomes  Goal: *Demonstrates ability to perform prescribed activity without shortness of breath or discomfort  Outcome: Progressing Towards Goal  Goal: *Verbalizes understanding and describes prescribed diet  Outcome: Progressing Towards Goal  Goal: *Verbalizes understanding/describes prescribed medications  Outcome: Progressing Towards Goal  Goal: *Describes available resources and support systems  Description: (eg: Home Health, Palliative Care, Advanced Medical Directive)  Outcome: Progressing Towards Goal

## 2021-04-29 NOTE — PROGRESS NOTES
[facility] in [place], [place]  Inpatient Consult Progress Note      Patient name: Tello Brock  Patient : 1950  Patient MRN: [id number]  Consulting MD: Ismael Turner MD  Date of service: 21    CHIEF COMPLAINT:  Dyspnea     PLAN:  · RHC  showed adequate Sal CI 3.0 but severely elevated RA pressure 24 mmHg  · Continue HD for volume management; per Nephrology- will need to determine longevity of HD therapy and plan for discharge   · ID consult for pancytopenia and persistent sed rate appreciated; ?alternative abx agent - recommend against stopping antibiotics altogether due to chronic SWI/suspected pump infection and high risk of clinical deterioration if infection is not suppressed  · Neurology consult appreciated for severe myoclonal jerking; suspect metabolic encephalopathy - possible offending agents held and this has significantly improved  · Palliative Care Consult to discuss goals and limits of care including HD - patient is a poor candidate for HD due to:   · End stage HF with LVAD and RV failure  · Chronic, active infection which would likely seed HD line  · Difficult disposition if patient were to require outpatient HD   · Discussed with daughter (Sarahi), Melinda; she and the patient understand concerns regarding patient's multiple co-morbidities, each with life limiting prognosis; they wish to pursue aggressive treatment including HD (if indicated) to prolong patient's life at home; goal is to spend time with her grandson, even if this means frequent trips to HD center.       Continue increased device speed to 9600rpm due to LVEDD up to 8.3cm  TTE  shows improved LVIDd, 6.68 cm with RVIDd 5.14 cm and TAPSE 1.1 cm   TTE 21 LVIDd 7.37cm, RVIDd 5.27cm, TAPSE 1.44cm   Previously was intolerant to higher speeds due to VT; observe closely   Volume management per Nephrology  [place] for strict I/O   Appreciate SLP consult and recommendations  CXR negative 4/27/21  Intolerant to BB/ACEi/ARB/ARNI due to aTTR  Continue tafamidis 61 mg PO daily  Hydralazine 100 mg po TID  Continue Imdur 30 mg PO daily   Orthostatics negative  Will request standing weights only   Intolerant of spironolactone due to hyperkalemia, renal failure   Coumadin dose per pharmacy; goal INR lowered to 1.8-2.5 due to GIB  Continue ranolazine, no ectopy - followed by Dr. 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 Lalita Muñoz)  · C/b delayed extubation due to severe COPD  · C/b critical illness polyneuropathy  · C/b prolonged hospitalization post-LVAD, 55 days  · C/b sternal wound infection s/p debridement (by Dr. Mario Swan) s/p wound vac  · C/b sacral ulcer  · Would culture positive for Staph aureus, not MRSA  · C/b LVAD site drainage, improved  S/p CRT-ICD  · ICD fired due to electrolyte imbalance (2011)  H/o breast cancer (1992)   · s/p bilateral mastectomy/chemo and endometrial cancer s/p hysterectomy  · Lymphedema of LLE due to cancer treatment  Severe COPD with FEV1 50%  Depression  Atrial fibrillation  H/o \"two mini strokes\"  S/p fall with hip facture   · Right hip hemmiarthroplasty (5/23/18) by Dr. Marianna Mason)  · S/p removed hip hardwarare due to pain (4/15/19)  COPD severe  CKD, stage 4  Hyperkalemia, resolved  Pulmonary hypertension  Cardiac risk factors:  · Morbid obesity, Body mass index is 42.29 kg/m². · DM2 insulin dependent  · JED on Trilogy  · HL  Urinary incontinence, severe  · no procedures due to anticoagulation  Endometrial cancer (2002)  HTN  HL     CARDIAC IMAGING:  Echo (9/24/19) LVEF 20-25%, AV opens 1:1, no AR  Echo (5/29/18) LVEF 10-%, ramps study done, report in [facility]  Echo (1/9/18) ramp study done, LVEDD 7.1cm  [facility] (11/9/18) 2 vessel disease with 90% OM, 80% PDA, DSA to PDA branch     INTERVAL HISTORY:  Tolerating HD  Cr trending down     Hgb stable   proBNP down today to 9900  Myoclonal jerking almost completely resolved  Working with PT/OT    LVAD INTERROGATION:  Device interrogated in person  Device function normal, normal flow, no events    LVAD   Pump Speed (RPM): 9600  Pump Flow (LPM): 5.3  MAP: 80  PI (Pulsitility Index): 5.5  Power: 6.1   Test: Yes  Back Up  at Bedside & Labeled: Yes  Power Module Test: No  Driveline Site Care: No  Driveline Dressing: Clean, Dry, and Intact  Outpatient: No  MAP in Therapeutic Range (Outpatient): Yes  Testing  Alarms Reviewed: Yes  Back up SC speed: 9600  Back up Low Speed Limit: 9200  Emergency Equipment with Patient?: Yes  Emergency procedures reviewed?: Yes  Drive line site inspected?: Yes  Drive line intergrity inspected?: Yes  Drive line dressing changed?: Yes    PHYSICAL EXAM:  Visit Vitals  BP (!) 80/0   Pulse 76   Temp 98 °F (36.7 °C)   Resp 18   Ht 5' 7\" (1.702 m)   Wt 252 lb 3.3 oz (114.4 kg)   SpO2 95%   BMI 39.50 kg/m²     General: Patient is well developed, more alert.  Improved tremors  HEENT: Normocephalic and atraumatic. No scleral icterus. Pupils are equal, round and reactive to light and accomodation. No conjunctival injection. Oropharynx is clear. Flushed cheeks. Neck: Supple. No evidence of thyroid enlargements or lymphadenopathy. JVD: Cannot be appreciated   Lungs: Breath sounds are equal and clear bilaterally. + wheezing. Heart: Regular rate and rhythm with normal S1 and S2. No murmurs, gallops or rubs. Abdomen: Soft, no mass or tenderness. No organomegaly or hernia. Bowel sounds present. Genitourinary and rectal: deferred  Extremities: BLE + venous stasis   Neurologic: Generalized myoclonal jerking, intentional tremors, +asterixis   Psychiatric: More alert today  Skin: Warm, dry and well perfused. No lesions, nodules or rashes are noted. REVIEW OF SYSTEMS:  General: Denies fever, night sweats. Ear, nose and throat: Denies difficulty hearing, sinus problems, runny nose, post-nasal drip, ringing in ears, mouth sores, loose teeth, ear pain, nosebleeds, sore throate, facial pain or numbess  Cardiovascular: see above in the interval history  Respiratory: Denies cough, wheezing, sputum production, hemoptysis.   Gastrointestinal: Denies heartburn, constipation, intolerance to certain foods, diarrhea, abdominal pain, nausea, vomiting, difficulty swallowing, blood in stool  Kidney and bladder: Denies painful urination, frequent urination, urgency, prostate problems and impotence  Musculoskeletal: Denies joint pain, muscle weakness  Skin and hair: Denies change in existing skin lesions, hair loss or increase, breast changes    PAST MEDICAL HISTORY:  Past Medical History:   Diagnosis Date    Asthma     Cancer [facility] [address]     breast    Cancer ([facility] [address]     endometrial    Congestive heart failure, unspecified     CRI (chronic renal insufficiency)     Depression     Diabetes ([facility] [address]     Hypertension        PAST SURGICAL HISTORY:  Past Surgical History:   Procedure Laterality Date    HX HERNIA REPAIR      HX HYSTERECTOMY      HX MASTECTOMY      IR INSERT NON TUNL CVC OVER 5 YRS  4/26/2021       FAMILY HISTORY:  No family history on file. SOCIAL HISTORY:  Social History     Socioeconomic History    Marital status:      Spouse name: Not on file    Number of children: Not on file    Years of education: Not on file    Highest education level: Not on file   Tobacco Use    Smoking status: Never Smoker    Smokeless tobacco: Never Used   Substance and Sexual Activity    Alcohol use: Not Currently       LABORATORY RESULTS:     Labs Latest Ref Rng & Units 4/29/2021 4/28/2021 4/27/2021 4/27/2021 4/27/2021 4/26/2021 4/26/2021   WBC 3.6 - 11.0 K/uL 3.6 3. 0(L) - - 3. 4(L) - -   RBC 3.80 - 5.20 M/uL 3.37(L) 3.15(L) - - 3.03(L) - -   Hemoglobin 11.5 - 16.0 g/dL 8.9(L) 8. 3(L) - - 8. 0(L) - -   Hematocrit 35.0 - 47.0 % 30. 4(L) 28. 5(L) - - 27. 6(L) - -   MCV 80.0 - 99.0 FL 90.2 90.5 - - 91.1 - -   Platelets 167 - 792 K/uL 155 137(L) - - 125(L) - -   Lymphocytes 12 - 49 % 11(L) 14 - - 13 - -   Monocytes 5 - 13 % 9 9 - - 9 - -   Eosinophils 0 - 7 % 0 0 - - 0 - -   Basophils 0 - 1 % 1 1 - - 0 - -   Albumin 3.5 - 5.0 g/dL 3.4(L) 3. 3(L) - 3.6 3.4(L) - -   Calcium 8.5 - 10.1 MG/DL 8.4(L) 8.7 - 8.9 9.0 9.2 9.0   Glucose 65 - 100 mg/dL 141(H) 79 - 101(H) 62(L) 86 108(H)   BUN 6 - 20 MG/DL 20 33(H) - 56(H) 55(H) 77(H) 75(H)   Creatinine 0.55 - 1.02 MG/DL 2.04(H) 2.25(H) - 3.16(H) 3.03(H) 4.15(H) 4.29(H)   Sodium 136 - 145 mmol/L 137 138 - 138 140 138 136   Potassium 3.5 - 5.1 mmol/L 4.0 3.8 3.8 3.9 3.8 4.3 4.2   TSH 0.36 - 3.74 uIU/mL - - - - - - -   LDH 81 - 246 U/L 228 188 - - 178 - -   Some recent data might be hidden     Lab Results   Component Value Date/Time    TSH 5.36 (H) 04/24/2021 04:32 AM    TSH 3.99 (H) 04/17/2021 04:54 AM    TSH 2.980 10/01/2020 12:00 AM       ALLERGY:  Allergies   Allergen Reactions    Benzodiazepines Other (comments)     Respiratory issues        CURRENT MEDICATIONS:    Current Facility-Administered Medications:     warfarin (COUMADIN) tablet 3 mg, 3 mg, Oral, ONCE, Festus Ribeiro MD    iron sucrose (VENOFER) 200 mg in 0.9% sodium chloride 100 mL IVPB, 200 mg, IntraVENous, DAILY, Triny Limon NP, Last Rate: 440 mL/hr at 04/29/21 1103, 200 mg at 04/29/21 1103    bisacodyL (DULCOLAX) suppository 10 mg, 10 mg, Rectal, DAILY PRN, Triny Limon, NP, 10 mg at 04/28/21 1950    benzocaine-zinc cl-benzalkonium cl (ORAJEL) 20-0.1-0.02 % mucosal gel, , Oral, PRN, Chris Howard MD, Given at 04/27/21 2211    lactulose (CHRONULAC) 10 gram/15 mL solution 30 mL, 20 g, Oral, DAILY, Alyssa Walter Apt T, NP, 30 mL at 04/29/21 0913    heparin (porcine) 1,000 unit/mL injection 1,100 Units, 1,100 Units, InterCATHeter, DIALYSIS PRN, 1,100 Units at 04/28/21 1655 **AND** heparin (porcine) 1,000 unit/mL injection 1,400 Units, 1,400 Units, InterCATHeter, DIALYSIS PRN, Felice Castillo MD, 1,400 Units at 04/28/21 1655    glucose chewable tablet 16 g, 4 Tab, Oral, PRN, Chris Howard MD    dextrose (D50W) injection syrg 12.5-25 g, 12.5-25 g, IntraVENous, PRN, Chris Howard MD, 25 g at 04/26/21 2246    glucagon (GLUCAGEN) injection 1 mg, 1 mg, IntraMUSCular, PRN, Chris Howard MD    0.9% sodium chloride infusion 250 mL, 250 mL, IntraVENous, PRN, Edison Chicas NP    budesonide-formoteroL (SYMBICORT) 160-4.5 mcg/actuation HFA inhaler 1 Puff (Patient Supplied), 1 Puff, Inhalation, BID RT, Chris Howard MD, 1 Puff at 04/29/21 0731    bumetanide (BUMEX) injection 2 mg, 2 mg, IntraVENous, TID, Polliard, Evlyn Apt T, NP, 2 mg at 04/29/21 0913    potassium chloride SR (KLOR-CON 10) tablet 20 mEq, 20 mEq, Oral, DAILY, Polliard, Evlyn Apt T, NP, 20 mEq at 04/29/21 0913    albuterol (PROVENTIL HFA, VENTOLIN HFA, PROAIR HFA) inhaler 2 Puff (Patient Supplied), 2 Puff, Inhalation, Q4H PRN, Chris Howard MD    epoetin amalia-epbx (RETACRIT) injection 20,000 Units, 20,000 Units, SubCUTAneous, Q MON, WED & FRI, Reji, Jaspreet ABBASI MD, 20,000 Units at 04/28/21 2214    oxymetazoline (AFRIN) 0.05 % nasal spray 2 Spray, 2 Spray, Both Nostrils, BID PRN, Polliard, Clifford Slim, NP, 2 Manson at 04/22/21 1800    sodium chloride (OCEAN) 0.65 % nasal squeeze bottle 2 Spray, 2 Spray, Both Nostrils, Q2H PRN, Polliard, Clifford Slim, NP    therapeutic multivitamin (THERAGRAN) tablet 1 Tab, 1 Tab, Oral, DAILY, Polliard, Eleonora Grise T, NP, 1 Tab at 04/29/21 0913    polyethylene glycol (MIRALAX) packet 17 g, 17 g, Oral, DAILY, Polliard, Eleonora Grise T, NP, 17 g at 04/29/21 0913    isosorbide mononitrate ER (IMDUR) tablet 30 mg, 30 mg, Oral, DAILY, Polliard, Eleonora Grise T, NP, 30 mg at 04/29/21 0913    cephALEXin (KEFLEX) capsule 250 mg, 250 mg, Oral, BID, Grant Christian MD, 250 mg at 04/29/21 0913    sodium chloride (NS) flush 5-40 mL, 5-40 mL, IntraVENous, Q8H, Polliard, Dana T, NP, 10 mL at 04/29/21 0640    sodium chloride (NS) flush 5-40 mL, 5-40 mL, IntraVENous, PRN, Polliard, Eleonora Grise T, NP, 10 mL at 04/22/21 1238    acetaminophen (TYLENOL) tablet 650 mg, 650 mg, Oral, Q4H PRN, Polliard, Dana T, NP, 650 mg at 04/29/21 1103    hydrALAZINE (APRESOLINE) 20 mg/mL injection 10 mg, 10 mg, IntraVENous, Q4H PRN, Polliard, Eleonora Grise T, NP, 10 mg at 04/21/21 2209    hydrALAZINE (APRESOLINE) tablet 100 mg, 100 mg, Oral, TID, Polliard, Eleonora Grise T, NP, 100 mg at 04/29/21 0913    hydrOXYzine HCL (ATARAX) tablet 10 mg, 10 mg, Oral, TID PRN, Polliard, Clifford Slim, NP    insulin glargine (LANTUS) injection 20 Units, 20 Units, SubCUTAneous, BID, Polliard, Clifford Slim, NP, 20 Units at 04/29/21 0913    PARoxetine (PAXIL) tablet 20 mg, 20 mg, Oral, DAILY, Polliargeoff, Eleonora MCGREGOR, NP, 20 mg at 04/29/21 0913    ranolazine ER (RANEXA) tablet 1,000 mg, 1,000 mg, Oral, BID, Polliard, Clifford Slim, NP, 1,000 mg at 04/29/21 3062    tafamidis cap 61 mg (Patient Supplied), 61 mg, Oral, DAILY, Jose Angel, Eleonora MCGREGOR, NP, 61 mg at 04/28/21 0900    warfarin dosing per pharmacy, , Other, Rx Dosing/Monitoring, Sofía Walter NP    miconazole (MICOTIN) 2 % powder, , Topical, BID, Beth Aj MD, Given at 04/28/21 1740    PATIENT CARE TEAM:  Patient Care Team:  Phyllis Cordero NP as PCP - General (Nurse Practitioner)  Pedrito Au MD (Cardiology)  Nirav Tello MD as Physician (Cardiology)     Thank you for allowing me to participate in this patient's care. Nathan Michaels NP  [facility] [address]  [address] [facility], [address]  Phone: [phone]      [facility] ATTENDING ADDENDUM    Patient was seen and examined in person. Data and notes were reviewed. I have discussed and agree with the plan as noted in the NP note above without further additions.     Siria Fall MD PhD  [address] [phone]

## 2021-04-30 NOTE — PROGRESS NOTES
Problem: Falls - Risk of  Goal: *Absence of Falls  Description: Document Shonda Pacheco Fall Risk and appropriate interventions in the flowsheet. Outcome: Progressing Towards Goal  Note: Fall Risk Interventions:  Mobility Interventions: Bed/chair exit alarm, Communicate number of staff needed for ambulation/transfer, Patient to call before getting OOB, Utilize walker, cane, or other assistive device         Medication Interventions: Bed/chair exit alarm, Evaluate medications/consider consulting pharmacy, Patient to call before getting OOB, Teach patient to arise slowly    Elimination Interventions: Call light in reach, Bed/chair exit alarm, Patient to call for help with toileting needs, Toilet paper/wipes in reach, Toileting schedule/hourly rounds, Stay With Me (per policy)    History of Falls Interventions: Consult care management for discharge planning, Bed/chair exit alarm, Door open when patient unattended, Evaluate medications/consider consulting pharmacy, Investigate reason for fall, Room close to nurse's station         Problem: Patient Education: Go to Patient Education Activity  Goal: Patient/Family Education  Outcome: Progressing Towards Goal     Problem: Pressure Injury - Risk of  Goal: *Prevention of pressure injury  Description: Document Jaime Scale and appropriate interventions in the flowsheet.   Outcome: Progressing Towards Goal  Note: Pressure Injury Interventions:  Sensory Interventions: Assess changes in LOC    Moisture Interventions: Absorbent underpads, Internal/External urinary devices    Activity Interventions: Increase time out of bed, PT/OT evaluation, Pressure redistribution bed/mattress(bed type)    Mobility Interventions: Float heels, HOB 30 degrees or less, Pressure redistribution bed/mattress (bed type), PT/OT evaluation    Nutrition Interventions: Document food/fluid/supplement intake, Offer support with meals,snacks and hydration    Friction and Shear Interventions: Minimize layers                Problem: Patient Education: Go to Patient Education Activity  Goal: Patient/Family Education  Outcome: Progressing Towards Goal     Problem: Heart Failure: Day 5  Goal: Off Pathway (Use only if patient is Off Pathway)  Outcome: Progressing Towards Goal  Goal: Activity/Safety  Outcome: Progressing Towards Goal  Goal: Diagnostic Test/Procedures  Outcome: Progressing Towards Goal  Goal: Nutrition/Diet  Outcome: Progressing Towards Goal  Goal: Discharge Planning  Outcome: Progressing Towards Goal  Goal: Medications  Outcome: Progressing Towards Goal  Goal: Respiratory  Outcome: Progressing Towards Goal  Goal: Treatments/Interventions/Procedures  Outcome: Progressing Towards Goal  Goal: Psychosocial  Outcome: Progressing Towards Goal     Problem: Diabetes Self-Management  Goal: *Disease process and treatment process  Description: Define diabetes and identify own type of diabetes; list 3 options for treating diabetes. Outcome: Progressing Towards Goal  Goal: *Incorporating nutritional management into lifestyle  Description: Describe effect of type, amount and timing of food on blood glucose; list 3 methods for planning meals. Outcome: Progressing Towards Goal  Goal: *Incorporating physical activity into lifestyle  Description: State effect of exercise on blood glucose levels. Outcome: Progressing Towards Goal  Goal: *Developing strategies to promote health/change behavior  Description: Define the ABC's of diabetes; identify appropriate screenings, schedule and personal plan for screenings. Outcome: Progressing Towards Goal  Goal: *Using medications safely  Description: State effect of diabetes medications on diabetes; name diabetes medication taking, action and side effects. Outcome: Progressing Towards Goal  Goal: *Monitoring blood glucose, interpreting and using results  Description: Identify recommended blood glucose targets  and personal targets.   Outcome: Progressing Towards Goal  Goal: *Prevention, detection, treatment of acute complications  Description: List symptoms of hyper- and hypoglycemia; describe how to treat low blood sugar and actions for lowering  high blood glucose level. Outcome: Progressing Towards Goal  Goal: *Prevention, detection and treatment of chronic complications  Description: Define the natural course of diabetes and describe the relationship of blood glucose levels to long term complications of diabetes.   Outcome: Progressing Towards Goal  Goal: *Developing strategies to address psychosocial issues  Description: Describe feelings about living with diabetes; identify support needed and support network  Outcome: Progressing Towards Goal     Problem: Breathing Pattern - Ineffective  Goal: *Use of effective breathing techniques  Outcome: Progressing Towards Goal  Goal: *PALLIATIVE CARE:  Alleviation of Dyspnea  Outcome: Progressing Towards Goal     Problem: Patient Education: Go to Patient Education Activity  Goal: Patient/Family Education  Outcome: Progressing Towards Goal     Problem: Nutrition Deficit  Goal: *Optimize nutritional status  Outcome: Progressing Towards Goal     Problem: High Risk or History of JED  Goal: Recognition of JED or High Risk for JED  Outcome: Progressing Towards Goal  Goal: Maintain Patent Airway and Adequate Oxygenation  Outcome: Progressing Towards Goal  Goal: Avoid Over-sedation  Outcome: Progressing Towards Goal  Goal: Maintenance Care of JED  Outcome: Progressing Towards Goal

## 2021-04-30 NOTE — WOUND CARE
WOCN Note:  
  
Follow up visit to reassess lower leg wounds and right buttock wound 
  
Chart shows: 
Patient admitted on 4/16/21, was direct admit from physicians office r/t heart failure 
       
Past Medical History:  
Diagnosis Date  Asthma    
 Cancer ([facility] [address]    
  breast  
 Cancer ([facility] [address]    
  endometrial  
 Congestive heart failure, unspecified    
 CRI (chronic renal insufficiency)    
 Depression    
 Diabetes ([facility]    
 Hypertension    
  
Admitted from home From 4/30/21 WBC = 3.2 Hgb = 8.4 Assessment:  
A&O x 4, Appropriately conversational 
Reports no pain Mobility: Moderate assistance with repositioning. Continence: Incontinent of stool, sahu in place Last Jaime Score: 17 
Surface: [employer] p500 mattress Diet: Diabetic consistent carb 
  
Bilateral heel and sacral skin intact. Sacrum without erythema. Bilateral heels with blanchable erythema Heels offloaded with pillows  
  
Patient has been receiving home care through [facility] Sent for wound care 1. POA Right anterior lower leg wound Etiology: Venous Partial thickness 0.2 x 0.2 x <0.1 cm Base is 100% pink  
serosang exudate, scant amount 
no odor  
attached edges Periwound intact with dry skin Image from 4/30/21 2. POA Right medial lower leg wound Etiology: Trauma, Pt hit leg on  door Full thickness 0.9 x 0.4 x 0.1 cm Base is 100% red granulation tissue 
serosang exudate, small amount 
no odor Attached flat edges Periwound intact and skin dry  
  
Image from 4/30/21 3. POA Left anterior lower leg wound  
Etiology: Venous Area has healed and is fully epithelialized 4. POA Stage 3 healing pressure injury to right buttock  
Etiology: Pressure  
Area has healed and is fully epithelialized Image from 4/30/21 Verbal consent given for wound photography Wound Recommendations:   
  
Continue to cleanse right anterior medial lower leg wounds with wound cleanser or normal saline, apply honey sheet, cover with optifoam border dressing every other day and prn if becomes soiled 
  
Continue Miconazole powder under abdominal fold BID after cleansing area well with Remedy foaming cleanser and patting dry 
  
Apply Z-Guard skin barrier cream to buttocks after gently cleansing area with Remedy foaming cleanser and patting dry every 8 hours and prn after each incontinent episode 
  
Moisturize bilateral lower legs with remedy skin cream every shift 
  
PI Prevention: 
Turn/reposition approximately every 2 hours Offload heels with pillows at all times in bed. Minimize layers of linen/pads under patient to optimize support surface to one sheet and one incontinence pad  
  
Discussed with RN, ARENDAL Transition of Care: Plan to follow weekly and as needed while admitted to hospital.   
  
Dusty GARRIDON, RN, [address] [place] [address],[address], [facility] Certified Wound and Ostomy Nurse 
office [phone] 
pager [phone] or call  to page

## 2021-04-30 NOTE — PROGRESS NOTES
Physical Therapy Note:  Chart reviewed in preparation for tx. Patient currently on dialysis and unavailable. Will defer and follow up per POC Monday. Recommend patient is OOB for meals with nursing over the weekend.   Ben Kumar, PT, DPT

## 2021-04-30 NOTE — PROCEDURES
[facility] Acutes  [phone]    Vitals   Pre   Post   Assessment   Pre   Post     Temp  Temp: 98 °F (36.7 °C) (04/30/21 0815)  98 LOC  A&Ox4 A&Ox4   HR   Pulse (Heart Rate): 72 (04/30/21 0815) 77 Lungs   Diminished in bases, RA +cough, remains on RA    B/P  BP: (86 doppler map) (04/28/21 1700) 82 Cardiac   S1S2, LVAD LVAD, RRR    Resp   Resp Rate: 18 (04/30/21 0815) 18 Skin   Warm, dry, intact No change    Pain level  Pain Intensity 1: 0 (04/30/21 0433) 0 Edema  generalized 2+ 2+   Orders:    Duration:   Start: 0815 End: 1115 Total: 3 hrs   Dialyzer:   Dialyzer/Set Up Inspection: Revaclear (04/30/21 0815)   K Bath:   Dialysate K (mEq/L): 3 (04/30/21 0815)   Ca Bath:   Dialysate CA (mEq/L): 2.5 (04/30/21 0815)   Na/Bicarb:   Dialysate NA (mEq/L): 140 (04/30/21 0815)   Target Fluid Removal:   Goal/Amount of Fluid to Remove (mL): 1000 mL (04/30/21 0815)   Access     Type & Location:   RIJ non-tunneled CVC, transparent dsg CDI and dated 04/27/21.  as ordered with acceptable AP/.     Labs     Obtained/Reviewed   Critical Results Called   Date when labs were drawn-  Hgb-    HGB   Date Value Ref Range Status   04/30/2021 8.4 (L) 11.5 - 16.0 g/dL Final     K-    Potassium   Date Value Ref Range Status   04/30/2021 4.2 3.5 - 5.1 mmol/L Final     Ca-   Calcium   Date Value Ref Range Status   04/30/2021 8.9 8.5 - 10.1 MG/DL Final     Bun-   BUN   Date Value Ref Range Status   04/30/2021 31 (H) 6 - 20 MG/DL Final     Creat-   Creatinine   Date Value Ref Range Status   04/30/2021 2.88 (H) 0.55 - 1.02 MG/DL Final     Comment:     INVESTIGATED PER DELTA CHECK PROTOCOL      Medications/ Blood Products Given     Name   Dose   Route and Time     Heparin  1100 units  1400 units  Arterial dwell 1.1 units  Venous dwell 1.4 units             Blood Volume Processed (BVP): 60   Net Fluid   Removed:  2000 mL   Comments   Time Out Done: 4701  Primary Nurse Rpt Markus Elkins, PADILLA  Primary Nurse Rpt Post:  Pt Education: CVC precautions, fluid balance, procedural  Care Plan: HD today, cont MWF. Monitor labs daily for renal recovery  Tx Summary:  0810: Safety checks complete, time out performed. 0815: CVC assessed, no redness, warmth or drainage noted. Transparent dressing and biopatch CDI. Each catheter limb disinfected for 60 seconds per limb with alcohol swabs. Caps removed, dialysis CVC hub scrubbed with Prevantics for 15 seconds, followed by a 5 second dry time per Hospital P&P. Aspirated and discarded 5ml from each lumen. +aspiration/flush. HD initiated. Access visible, lines secure. Medications reviewed. 0845: LUNA Jones MD at bedside for exam and to discuss plan of care. VORB to increase UF to 2L as tolerated. 1115: HD complete. All possible blood rinsed back. Each catheter limb disinfected for 60 seconds per limb with alcohol swabs. Dialysis CVC hubs scrubbed with Prevantics for 15 seconds, followed by a 5 second dry time per Hospital P&P. Saline flushed, locked and capped. SBAR called to primary RN.     Admitting Diagnosis:   Pt's previous clinic: n/a new start  Informed Consent Verified: Yes (04/30/21 0815)   Hepatitis Status: HBsAg: Neg (04/21/21) HBsAb: <3/susceptible (04/27/21)  Machine Number: [id number] (04/30/21 0815)   Telemetry status: bedside cardiac monitor  Pre-Dialysis Weight: 115.4 kg (254 lb 6.6 oz) (04/30/21 0815)

## 2021-04-30 NOTE — PROGRESS NOTES
11:45 Bed alarm removed per recommendation of [facility], wound care, due to pressure it is applying to patient's newly healed sore on her bottom, which could cause wound to reoccur. Patient is alert and oriented, has been calling out appropriately, does not get up without assistance, and family is at bedside. 14:18 LVAD drive line dressing changed. Cleansed with chg and NS, applied xeroform, split gauze, 2x2, and transparent dressing. Two anchor devices present to secure drive line. 19:45 Bedside shift change report given to [id number] [place] [address] (oncoming nurse) by Dawson Foster (offgoing nurse). Report included the following information SBAR.

## 2021-04-30 NOTE — PROGRESS NOTES
Transition of Care Plan   RUR-high    DISPOSITION: Referral sent to [facility] and [address] [place] [address].  F/U with PCP/Specialist     Transport: [facility]   Patient is planning to live with daughter Wellington Patel [phone] [address] [address] after leaving [facility]. From last CM note, CM is still working on securing [facility] agency if IPR is declined/ denied and OP HD clinic that is LVAD certified.          Nadira Barnes  3:21 PM

## 2021-04-30 NOTE — PROGRESS NOTES
Problem: Mobility Impaired (Adult and Pediatric)  Goal: *Acute Goals and Plan of Care (Insert Text)  Description: FUNCTIONAL STATUS PRIOR TO ADMISSION: Patient was modified independent using a rollator for functional mobility. HOME SUPPORT PRIOR TO ADMISSION: The patient lived alone with daughter to provide assistance. Will be staying with daughter once discharged. Physical Therapy Goals  Revised 4/26/2021  1. Patient will move from supine to sit and sit to supine , scoot up and down, and roll side to side in bed with minimal assistance/contact guard assist within 7 day(s). 2.  Patient will transfer from bed to chair and chair to bed with minimal assistance/contact guard assist using the least restrictive device within 7 day(s). 3.  Patient will perform sit to stand with minimal assistance/contact guard assist within 7 day(s). 4.  Patient will sit at EOB with supervision for balance engaged in task for 5 minutes within 7 day(s). Physical Therapy Goals  Initiated 4/17/2021  1. Patient will move from supine to sit and sit to supine  and scoot up and down in bed with modified independence within 7 day(s). 2.  Patient will transfer from bed to chair and chair to bed with modified independence using the least restrictive device within 7 day(s). 3.  Patient will perform sit to stand with modified independence within 7 day(s). 4.  Patient will ambulate with modified independence for 150 feet with the least restrictive device within 7 day(s). Outcome: Progressing Towards Goal     PHYSICAL THERAPY TREATMENT  Patient: Earlene Perez (60 y.o. female)  Date: 4/30/2021  Diagnosis: Dyspnea [R06.00] <principal problem not specified>  Procedure(s) (LRB):  RIGHT HEART CATH (N/A) 10 Days Post-Op  Precautions: (LVAD)  Chart, physical therapy assessment, plan of care and goals were reviewed. ASSESSMENT  Patient continues with skilled PT services and is progressing towards goals.  Patient agreeable to any intervention but expressed concern for fatigue after dialysis and diarrhea. Challenged with bed mobility requiring additional time and moderate assist for supine to sit. Completed seated exercises with good patient effort and form. Completed standing marching trial x15 reps using her rollator for balance and requiring CGA for rollator and line management. Transferred to bedside chair post session and notified nursing. Patient is making daily progress and is motivated to do so. She remains endurance limited following admission for dyspnea and a right heart cath. Continue to recommend discharge to [facility] when medically stable. Current Level of Function Impacting Discharge (mobility/balance): moderate assist for bed mobility             PLAN :  Patient continues to benefit from skilled intervention to address the above impairments. Continue treatment per established plan of care. to address goals. Recommendation for discharge: (in order for the patient to meet his/her long term goals)  Therapy 3 hours per day 5-7 days per week    This discharge recommendation:  Has been made in collaboration with the attending provider and/or case management    IF patient discharges home will need the following DME: patient owns DME required for discharge       SUBJECTIVE:   Patient stated I have been having horrible diarrhea lary.     OBJECTIVE DATA SUMMARY:   Critical Behavior:  Neurologic State: Alert  Orientation Level: Oriented X4  Cognition: Appropriate decision making, Appropriate safety awareness, Appropriate for age attention/concentration, Follows commands  Safety/Judgement: Decreased insight into deficits, Decreased awareness of need for safety  Functional Mobility Training:  Bed Mobility:     Supine to Sit: Moderate assistance; Additional time              Transfers:  Sit to Stand: Minimum assistance  Stand to Sit: Minimum assistance                             Balance:  Sitting: Impaired  Sitting - Static: Good (unsupported)  Sitting - Dynamic: Fair (occasional)  Standing: Impaired  Standing - Static: Fair  Standing - Dynamic : Fair  Ambulation/Gait Training:   Standing marching x15 reps to rollator requiring CGA for line and DME management   Stairs: Therapeutic Exercises:   Seated ankle pumps, LAQ, marches x15 reps each  Pain Rating:      Activity Tolerance:   Fair and requires rest breaks    After treatment patient left in no apparent distress:   Sitting in chair and Call bell within reach    COMMUNICATION/COLLABORATION:   The patients plan of care was discussed with: Registered nurse.      Celine Fitch, PT, DPT   Time Calculation: 38 mins

## 2021-04-30 NOTE — PROGRESS NOTES
[address] [facility]  Hospitalist Group Hospitalist Progress Note Lizbeth Gutierrez MD 
Answering service: [phone] OR [phone] from in house phone NAME:  Estella Oneill :  1950 MRN:  [id number] Admission Summary:  
Estella Oneill is a 78 y.o. female with a past medical history of heart failure s/p LVAD, COPD, CKD stage 3, pulmonary HTN, Depression, HTN, and HLD who presented to [facility] appointment with complaints of chest tightness and increasing dyspnea. She was instructed to come to [facility] for admission for further treatment. At time of examination patient experiencing dyspnea but states she's feeling 100% better than what she felt yesterday. Interval history / Subjective:  
Patient seen and examined. Feels ok, no acute events overnight, likely needs rehab. Assessment & Plan:  
 
 Acute on Chronic systolic congestive heart failure , s/p LVAD 
- NYHA class IV ;  s/p LVAD heartMate 2 , increased speed per HF team  
- Advanced heart failure team following and adjusting meds , anticoagulation with warfarin [facility]-  to measure heart pressures  
  
COPD-  Stopped nebs- resume home inhalers- symbicort and albuterol Severe tremors- unclear etiology- stopped nebs and thyroid med Improved after blood transfusion and dialysis,  
neurology eval completed- neurontin discontinued Subclinical hypothyroidism- normal free T4 with minimally elevated TSH Do not recommend any treatment or thyroid meds for these labs- DC synthroid Constipation with KUB suggesting fecal impaction- resolved after suppository CKD stage 3 - advanced to ESRD- now on HD. Nephrology following  
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  
JED -  home trilogy, BiPAP inpatient if patient able to tolerate this Depression - cont Paroxetine H/o breast cancer (1992) - s/p bilateral mastectomy/chemo and Endometrial ca s/p hysterectomy Pancytopenia- stable but persists - Etiology unknown at this point - Hematology consulted for eval and tx 
 transfused PRBC this admission Code status: full DVT prophylaxis: warfarin- therapeutic INR Care Plan discussed with: Patient/Family Anticipated Disposition: Home w/Family Anticipated Discharge: next week- looking for rehab- maybe near [facility] that can manage LVAD, trilogy, and dialysis if needed Hospital Problems  Date Reviewed: 4/25/2021 Codes Class Noted POA Dyspnea ICD-10-CM: R06.00 
ICD-9-CM: 786.09  4/16/2021 Yes Coagulopathy ([facility] ICD-10-CM: R22.8 ICD-9-CM: 286.9  11/10/2020 Yes Open wound of left lower leg ICD-10-CM: O69.038W ICD-9-CM: 891.0  11/10/2020 Yes Anemia ICD-10-CM: D64.9 ICD-9-CM: 285.9  11/9/2020 Yes NICM (nonischemic cardiomyopathy) ([facility] ICD-10-CM: I42.8 ICD-9-CM: 425.4  11/4/2020 Yes Coronary artery disease involving native coronary artery of native heart without angina pectoris ICD-10-CM: I25.10 ICD-9-CM: 414.01  11/4/2020 Yes Chronic venous insufficiency ICD-10-CM: I87.2 ICD-9-CM: 459.81  11/4/2020 Yes Acute on chronic systolic CHF (congestive heart failure) (HCC) ICD-10-CM: E53.91 ICD-9-CM: 428.23, 428.0  10/27/2020 Yes Obesity, morbid ([facility] ICD-10-CM: E66.01 
ICD-9-CM: 278.01  10/1/2020 Yes Review of Systems: A comprehensive review of systems was negative except for that written in the HPI. Vital Signs:  
 Last 24hrs VS reviewed since prior progress note. Most recent are: 
Visit Vitals BP (!) 80/0 Pulse 75 Temp 98 °F (36.7 °C) (Oral) Resp 16 Ht 5' 7\" (1.702 m) Wt 115.4 kg (254 lb 6.6 oz) SpO2 93% BMI 39.85 kg/m² Patient Vitals for the past 24 hrs: 
 Temp Pulse Resp SpO2  
04/30/21 1015  75 16   
04/30/21 1000  84 21   
04/30/21 0945  77 18   
04/30/21 0942  73    
04/30/21 0930  73 16   
04/30/21 0915  74 27   
04/30/21 0900  71 25   
04/30/21 0845  71 22   
04/30/21 0830  73 26   
04/30/21 0826    93 % 04/30/21 0815 98 °F (36.7 °C) 72 18   
04/30/21 0433 97.7 °F (36.5 °C) 71 20 92 % 04/29/21 2308 98.2 °F (36.8 °C) 81 20 93 % 04/29/21 2046  86 24   
04/1950    95 % 04/29/21 1910 97.8 °F (36.6 °C) 82 24 95 % 04/29/21 1756  89    
04/29/21 1515 98.1 °F (36.7 °C) 90 20 95 % 04/29/21 1105 97.9 °F (36.6 °C) 86 22 94 % Intake/Output Summary (Last 24 hours) at 4/30/2021 1019 Last data filed at 4/30/2021 9037 Gross per 24 hour Intake 520 ml Output 350 ml Net 170 ml Physical Examination:  
     
Constitutional: NAD, awake and alert , obese, [employer]   
   
Resp: Decreased breath sounds, no wheezing, no creps. No accessory muscle use CV:  audible LVAD motor GI:  Soft, non distended, non tender. BS +, no hepatosplenomegaly Musculoskeletal:  mild-mod edema, warm, wounds on bilat LE Neurologic:  Moves all extremities. AAOx3, diffuse mild tremors in upper and lower extremities. Data Review:  
 Review and/or order of clinical lab test 
XR CHEST PORT 
  
INDICATION: Possible aspiration 
  
COMPARISON: 4/26/2021 at 1406 hours 
  
TECHNIQUE: Portable AP upright chest view at 0950 hours 
  
FINDINGS: The support devices are stable. There is stable cardiac silhouette 
enlargement. The pulmonary vasculature is within normal limits.  
  
The lungs and pleural spaces are clear. There is no pneumothorax. The bones and 
upper abdomen are stable. 
  
IMPRESSION 
  
Clear lungs. Stable cardiac silhouette enlargement. 
  
 
Labs:  
 
Recent Labs 04/30/21 
0457 04/29/21 
0017 WBC 3.2* 3.6 HGB 8.4* 8.9* HCT 29.5* 30.4*  155 Recent Labs 04/30/21 
5713 04/29/21 
2113 04/29/21 
0017 04/28/21 
9288   --  137 138 K 4.2 4.3 4.0 3.8   --  100 104 CO2 30  --  30 30 BUN 31*  --  20 33* CREA 2.88*  --  2.04* 2.25* GLU 84  --  141* 79  
CA 8.9  --  8.4* 8.7 MG  --  2.5* 2.2 2.3 PHOS 2.8  --  1.8* 2.2* Recent Labs 04/30/21 
9422 04/29/21 
0017 04/28/21 
6093 ALT 11* 10* 9* AP 52 59 52 TBILI 0.6 0.7 0.6 TP 7.8 7.8 7.5 ALB 3.3* 3.4* 3.3*  
GLOB 4.5* 4.4* 4.2* Recent Labs 04/30/21 
4394 04/29/21 
0017 04/28/21 
0685 INR 2.3* 2.3* 2.1* PTP 22.9* 23.4* 21.3* No results for input(s): FE, TIBC, PSAT, FERR in the last 72 hours. Lab Results Component Value Date/Time Folate 10.0 10/28/2020 03:56 AM  
  
No results for input(s): PH, PCO2, PO2 in the last 72 hours. No results for input(s): CPK, CKNDX, TROIQ in the last 72 hours. No lab exists for component: CPKMB Lab Results Component Value Date/Time Cholesterol, total 129 04/16/2021 10:40 AM  
 HDL Cholesterol 33 04/16/2021 10:40 AM  
 LDL, calculated 74 04/16/2021 10:40 AM  
 Triglyceride 110 04/16/2021 10:40 AM  
 CHOL/HDL Ratio 3.9 04/16/2021 10:40 AM  
 
Lab Results Component Value Date/Time Glucose (POC) 83 04/30/2021 06:30 AM  
 Glucose (POC) 112 (H) 04/29/2021 09:27 PM  
 Glucose (POC) 113 (H) 04/29/2021 04:09 PM  
 Glucose (POC) 136 (H) 04/29/2021 12:06 PM  
 Glucose (POC) 99 04/29/2021 06:05 AM  
 
Lab Results Component Value Date/Time  Color YELLOW/STRAW 04/18/2021 05:08 PM  
 Appearance CLEAR 04/18/2021 05:08 PM  
 Specific gravity 1.016 04/18/2021 05:08 PM  
 Specific gravity 1.013 11/09/2020 12:05 AM  
 pH (UA) 6.5 04/18/2021 05:08 PM  
 Protein 30 (A) 04/18/2021 05:08 PM  
 Glucose Negative 04/18/2021 05:08 PM  
 Ketone Negative 04/18/2021 05:08 PM  
 Bilirubin Negative 04/18/2021 05:08 PM  
 Urobilinogen 0.2 04/18/2021 05:08 PM  
 Nitrites Negative 04/18/2021 05:08 PM  
 Leukocyte Esterase SMALL (A) 04/18/2021 05:08 PM  
 Epithelial cells FEW 04/18/2021 05:08 PM  
 Bacteria Negative 04/18/2021 05:08 PM  
 WBC 0-4 04/18/2021 05:08 PM  
 RBC 0-5 04/18/2021 05:08 PM  
 
 
 
Medications Reviewed:  
 
Current Facility-Administered Medications Medication Dose Route Frequency  warfarin (COUMADIN) tablet 3 mg  3 mg Oral ONCE  
 bisacodyL (DULCOLAX) suppository 10 mg  10 mg Rectal DAILY PRN  
 benzocaine-zinc cl-benzalkonium cl (ORAJEL) 20-0.1-0.02 % mucosal gel   Oral PRN  
 lactulose (CHRONULAC) 10 gram/15 mL solution 30 mL  20 g Oral DAILY  heparin (porcine) 1,000 unit/mL injection 1,100 Units  1,100 Units InterCATHeter DIALYSIS PRN And  
 heparin (porcine) 1,000 unit/mL injection 1,400 Units  1,400 Units InterCATHeter DIALYSIS PRN  
 glucose chewable tablet 16 g  4 Tab Oral PRN  
 dextrose (D50W) injection syrg 12.5-25 g  12.5-25 g IntraVENous PRN  
 glucagon (GLUCAGEN) injection 1 mg  1 mg IntraMUSCular PRN  
 0.9% sodium chloride infusion 250 mL  250 mL IntraVENous PRN  
 budesonide-formoteroL (SYMBICORT) 160-4.5 mcg/actuation HFA inhaler 1 Puff (Patient Supplied)  1 Puff Inhalation BID RT  
 bumetanide (BUMEX) injection 2 mg  2 mg IntraVENous TID  potassium chloride SR (KLOR-CON 10) tablet 20 mEq  20 mEq Oral DAILY  albuterol (PROVENTIL HFA, VENTOLIN HFA, PROAIR HFA) inhaler 2 Puff (Patient Supplied)  2 Puff Inhalation Q4H PRN  
 epoetin amalia-epbx (RETACRIT) injection 20,000 Units  20,000 Units SubCUTAneous Q MON, WED & FRI  
 oxymetazoline (AFRIN) 0.05 % nasal spray 2 Spray  2 Spray Both Nostrils BID PRN  
 sodium chloride (OCEAN) 0.65 % nasal squeeze bottle 2 Spray  2 Spray Both Nostrils Q2H PRN  therapeutic multivitamin (THERAGRAN) tablet 1 Tab  1 Tab Oral DAILY  polyethylene glycol (MIRALAX) packet 17 g  17 g Oral DAILY  isosorbide mononitrate ER (IMDUR) tablet 30 mg  30 mg Oral DAILY  cephALEXin (KEFLEX) capsule 250 mg  250 mg Oral BID  sodium chloride (NS) flush 5-40 mL  5-40 mL IntraVENous Q8H  
 sodium chloride (NS) flush 5-40 mL  5-40 mL IntraVENous PRN  
 acetaminophen (TYLENOL) tablet 650 mg  650 mg Oral Q4H PRN  
 hydrALAZINE (APRESOLINE) 20 mg/mL injection 10 mg  10 mg IntraVENous Q4H PRN  
 hydrALAZINE (APRESOLINE) tablet 100 mg  100 mg Oral TID  hydrOXYzine HCL (ATARAX) tablet 10 mg  10 mg Oral TID PRN  
 insulin glargine (LANTUS) injection 20 Units  20 Units SubCUTAneous BID  PARoxetine (PAXIL) tablet 20 mg  20 mg Oral DAILY  ranolazine ER (RANEXA) tablet 1,000 mg  1,000 mg Oral BID  tafamidis cap 61 mg (Patient Supplied)  61 mg Oral DAILY  warfarin dosing per pharmacy   Other Rx Dosing/Monitoring  miconazole (MICOTIN) 2 % powder   Topical BID  
 
______________________________________________________________________ EXPECTED LENGTH OF STAY: 5d 7h 
ACTUAL LENGTH OF STAY:          Liz Paris MD

## 2021-04-30 NOTE — PROGRESS NOTES
[facility] 
 [id number] [facility], [address] [facility] Phone: [phone]  
  
Nephrology Progress Note 
Negro Jenkins     1950     [id number] Date of Admission : 4/16/2021 04/30/21 CC: Follow up for ROCIO Assessment and Plan ROCIO on CKD: 
- likely 2/2 progressive CKD and CRS 
- dialysis initiated 4/26 
- HD now MWF 
- cont IV diuretics for now 
- daily labs and strict I/Os 
- not a great long-term HD candidate and she may not even tolerate HD well in the short term- discussed at length with daughter and she wishes to proceed with dialysis at this time Asterixis and encephalopathy 
- appreciate neurology 
- all offending meds off 
- improving with HD so far CKD stage III. - baseline around 2 
- presumed 2/2 DM, HTN. 
- Bone marrow Bx not done. - Renal US appearance of Kidneys not suggestive of Amyloidosis  
  
Chronic systolic CHF, stage C NYHA class IV 
-Combined ischemic and nonischemic cardiomyopathy 
-S/p HM 2 LVAD implant 4/15/2017 by Dr. Carmen Wisdom at [facility] 
- hx of recurrent VT : off mexiletine - chronic RV failure. 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 Chronic anemia :  
+ve PYP testing.  
- anemia of chronic disease  
- continue HEIKE Leucopenia, Thrombocytopenia   
Severe COPD. Pulmonary hypertension. Chronic A. fib. History of endometrial Ca ATTR amyloidosis Interval History: 
Seen and examined on HD today. Tolerating HD. Tremors and encephalopathy resolved. Oliguric. No cp, sob, n/v/d reported. Review of Systems: A comprehensive review of systems was negative except for that written in the HPI. Current Medications:  
Current Facility-Administered Medications Medication Dose Route Frequency  warfarin (COUMADIN) tablet 3 mg  3 mg Oral ONCE  
 bisacodyL (DULCOLAX) suppository 10 mg  10 mg Rectal DAILY PRN  
 benzocaine-zinc cl-benzalkonium cl (ORAJEL) 20-0.1-0.02 % mucosal gel   Oral PRN  
 lactulose (CHRONULAC) 10 gram/15 mL solution 30 mL  20 g Oral DAILY  heparin (porcine) 1,000 unit/mL injection 1,100 Units  1,100 Units InterCATHeter DIALYSIS PRN And  
 heparin (porcine) 1,000 unit/mL injection 1,400 Units  1,400 Units InterCATHeter DIALYSIS PRN  
 glucose chewable tablet 16 g  4 Tab Oral PRN  
 dextrose (D50W) injection syrg 12.5-25 g  12.5-25 g IntraVENous PRN  
 glucagon (GLUCAGEN) injection 1 mg  1 mg IntraMUSCular PRN  
 0.9% sodium chloride infusion 250 mL  250 mL IntraVENous PRN  
 budesonide-formoteroL (SYMBICORT) 160-4.5 mcg/actuation HFA inhaler 1 Puff (Patient Supplied)  1 Puff Inhalation BID RT  
 bumetanide (BUMEX) injection 2 mg  2 mg IntraVENous TID  potassium chloride SR (KLOR-CON 10) tablet 20 mEq  20 mEq Oral DAILY  albuterol (PROVENTIL HFA, VENTOLIN HFA, PROAIR HFA) inhaler 2 Puff (Patient Supplied)  2 Puff Inhalation Q4H PRN  
 epoetin amalia-epbx (RETACRIT) injection 20,000 Units  20,000 Units SubCUTAneous Q MON, WED & FRI  
 oxymetazoline (AFRIN) 0.05 % nasal spray 2 Spray  2 Spray Both Nostrils BID PRN  
 sodium chloride (OCEAN) 0.65 % nasal squeeze bottle 2 Spray  2 Spray Both Nostrils Q2H PRN  therapeutic multivitamin (THERAGRAN) tablet 1 Tab  1 Tab Oral DAILY  polyethylene glycol (MIRALAX) packet 17 g  17 g Oral DAILY  isosorbide mononitrate ER (IMDUR) tablet 30 mg  30 mg Oral DAILY  cephALEXin (KEFLEX) capsule 250 mg  250 mg Oral BID  sodium chloride (NS) flush 5-40 mL  5-40 mL IntraVENous Q8H  
 sodium chloride (NS) flush 5-40 mL  5-40 mL IntraVENous PRN  
 acetaminophen (TYLENOL) tablet 650 mg  650 mg Oral Q4H PRN  
 hydrALAZINE (APRESOLINE) 20 mg/mL injection 10 mg  10 mg IntraVENous Q4H PRN  
 hydrALAZINE (APRESOLINE) tablet 100 mg  100 mg Oral TID  hydrOXYzine HCL (ATARAX) tablet 10 mg  10 mg Oral TID PRN  
 insulin glargine (LANTUS) injection 20 Units  20 Units SubCUTAneous BID  PARoxetine (PAXIL) tablet 20 mg 20 mg Oral DAILY  ranolazine ER (RANEXA) tablet 1,000 mg  1,000 mg Oral BID  tafamidis cap 61 mg (Patient Supplied)  61 mg Oral DAILY  warfarin dosing per pharmacy   Other Rx Dosing/Monitoring  miconazole (MICOTIN) 2 % powder   Topical BID Allergies Allergen Reactions  Benzodiazepines Other (comments) Respiratory issues Objective: 
Vitals:  
Vitals:  
 04/30/21 1030 04/30/21 1045 04/30/21 1100 04/30/21 1132 Pulse: 73 73 76 76 Resp: 23 21 23 24 Temp:    98.2 °F (36.8 °C) TempSrc:      
SpO2:    94% Weight:      
Height:      
 
Intake and Output: 
04/30 0701 - 04/30 1900 In: 110 [I.V.:110] Out: 125 [Urine:125] 04/28 1901 - 04/30 0700 In: 18 [P.O.:750; I.V.:110] Out: 535 [Urine:535] Physical Examination: 
General: Morbidly obese, in NAD Portia [place]: Supple,no mass palpable Lungs : CTA  
CVS: RRR, VAD sounds Abdomen: Soft, NT, BS +, obese Extremities: LE discoloration, LUE lymphedema Neurologic: Awake, alert, minimal tremors now Access: Dianne Kida in place  : adelina 
  
 
[]    High complexity decision making was performed 
[]    Patient is at high-risk of decompensation with multiple organ involvement Lab Data Personally Reviewed: I have reviewed all the pertinent labs, microbiology data and radiology studies during assessment. Recent Labs 04/30/21 
5180 04/29/21 
2113 04/29/21 
0017 04/28/21 
0258 04/27/21 
2217   --  137 138  --   
K 4.2 4.3 4.0 3.8 3.8   --  100 104  --   
CO2 30  --  30 30  --   
GLU 84  --  141* 79  --   
BUN 31*  --  20 33*  --   
CREA 2.88*  --  2.04* 2.25*  --   
CA 8.9  --  8.4* 8.7  --   
MG  --  2.5* 2.2 2.3  --   
PHOS 2.8  --  1.8* 2.2*  --   
ALB 3.3*  --  3.4* 3.3*  --   
ALT 11*  --  10* 9*  --   
INR 2.3*  --  2.3* 2.1*  --   
 
Recent Labs 04/30/21 
3080 04/29/21 
0017 04/28/21 
2758 WBC 3.2* 3.6 3.0* HGB 8.4* 8.9* 8.3* HCT 29.5* 30.4* 28.5*  
 155 137* No results found for: SDES Lab Results Component Value Date/Time Culture result: NO GROWTH 5 DAYS 04/23/2021 06:51 PM  
 Culture result: NO GROWTH 5 DAYS 11/10/2020 06:42 PM  
 Culture result: No Growth (<1000 cfu/mL) 11/09/2020 12:05 AM  
 Culture result: SCANT Morganella morganii ssp morganii (A) 10/30/2020 11:30 AM  
 Culture result: SCANT Morganella morganii ssp morganii (A) 10/30/2020 11:30 AM  
 Culture result: RARE DIPHTHEROIDS (A) 10/30/2020 11:30 AM  
 
Recent Results (from the past 24 hour(s)) GLUCOSE, POC Collection Time: 04/29/21  4:09 PM  
Result Value Ref Range Glucose (POC) 113 (H) 65 - 100 mg/dL Performed by [place] MAGNESIUM Collection Time: 04/29/21  9:13 PM  
Result Value Ref Range Magnesium 2.5 (H) 1.6 - 2.4 mg/dL POTASSIUM Collection Time: 04/29/21  9:13 PM  
Result Value Ref Range Potassium 4.3 3.5 - 5.1 mmol/L  
GLUCOSE, POC Collection Time: 04/29/21  9:27 PM  
Result Value Ref Range Glucose (POC) 112 (H) 65 - 100 mg/dL Performed by Angelita Pro PROTHROMBIN TIME + INR Collection Time: 04/30/21  4:57 AM  
Result Value Ref Range INR 2.3 (H) 0.9 - 1.1 Prothrombin time 22.9 (H) 9.0 - 11.1 sec NT-PRO BNP Collection Time: 04/30/21  4:57 AM  
Result Value Ref Range NT pro-BNP 9,994 (H) <125 PG/ML  
LD Collection Time: 04/30/21  4:57 AM  
Result Value Ref Range  81 - 246 U/L  
CBC WITH AUTOMATED DIFF Collection Time: 04/30/21  4:57 AM  
Result Value Ref Range WBC 3.2 (L) 3.6 - 11.0 K/uL  
 RBC 3.24 (L) 3.80 - 5.20 M/uL HGB 8.4 (L) 11.5 - 16.0 g/dL HCT 29.5 (L) 35.0 - 47.0 % MCV 91.0 80.0 - 99.0 FL  
 MCH 25.9 (L) 26.0 - 34.0 PG  
 MCHC 28.5 (L) 30.0 - 36.5 g/dL  
 RDW 19.6 (H) 11.5 - 14.5 % PLATELET 738 554 - 138 K/uL MPV 9.7 8.9 - 12.9 FL  
 NRBC 0.0 0  WBC ABSOLUTE NRBC 0.00 0.00 - 0.01 K/uL NEUTROPHILS 68 32 - 75 % LYMPHOCYTES 17 12 - 49 % MONOCYTES 13 5 - 13 %  EOSINOPHILS 0 0 - 7 % BASOPHILS 1 0 - 1 % IMMATURE GRANULOCYTES 1 (H) 0.0 - 0.5 % ABS. NEUTROPHILS 2.3 1.8 - 8.0 K/UL  
 ABS. LYMPHOCYTES 0.5 (L) 0.8 - 3.5 K/UL  
 ABS. MONOCYTES 0.4 0.0 - 1.0 K/UL  
 ABS. EOSINOPHILS 0.0 0.0 - 0.4 K/UL  
 ABS. BASOPHILS 0.0 0.0 - 0.1 K/UL  
 ABS. IMM. GRANS. 0.0 0.00 - 0.04 K/UL  
 DF SMEAR SCANNED    
 RBC COMMENTS ANISOCYTOSIS 1+ 
    
 RBC COMMENTS OVALOCYTES PRESENT 
    
PHOSPHORUS Collection Time: 04/30/21  4:57 AM  
Result Value Ref Range Phosphorus 2.8 2.6 - 4.7 MG/DL  
AMMONIA Collection Time: 04/30/21  4:57 AM  
Result Value Ref Range Ammonia 33 (H) <29 UMOL/L  
METABOLIC PANEL, COMPREHENSIVE Collection Time: 04/30/21  4:57 AM  
Result Value Ref Range Sodium 139 136 - 145 mmol/L Potassium 4.2 3.5 - 5.1 mmol/L Chloride 104 97 - 108 mmol/L  
 CO2 30 21 - 32 mmol/L Anion gap 5 5 - 15 mmol/L Glucose 84 65 - 100 mg/dL BUN 31 (H) 6 - 20 MG/DL Creatinine 2.88 (H) 0.55 - 1.02 MG/DL  
 BUN/Creatinine ratio 11 (L) 12 - 20 GFR est AA 20 (L) >60 ml/min/1.73m2 GFR est non-AA 16 (L) >60 ml/min/1.73m2 Calcium 8.9 8.5 - 10.1 MG/DL Bilirubin, total 0.6 0.2 - 1.0 MG/DL  
 ALT (SGPT) 11 (L) 12 - 78 U/L  
 AST (SGOT) 16 15 - 37 U/L Alk. phosphatase 52 45 - 117 U/L Protein, total 7.8 6.4 - 8.2 g/dL Albumin 3.3 (L) 3.5 - 5.0 g/dL Globulin 4.5 (H) 2.0 - 4.0 g/dL A-G Ratio 0.7 (L) 1.1 - 2.2 SAMPLES BEING HELD Collection Time: 04/30/21  4:57 AM  
Result Value Ref Range SAMPLES BEING HELD pst   
 COMMENT Add-on orders for these samples will be processed based on acceptable specimen integrity and analyte stability, which may vary by analyte. GLUCOSE, POC Collection Time: 04/30/21  6:30 AM  
Result Value Ref Range Glucose (POC) 83 65 - 100 mg/dL Performed by Hira Goncalves I have reviewed the flowsheets. Chart and Pertinent Notes have been reviewed.   
No change in PMH ,family and social history from Consult note.  
 
 
Desi Esquivel MD

## 2021-04-30 NOTE — PROGRESS NOTES
[facility] VAD/Heart Failure Progress Note    Admit Date: 2021  POD:  10 Days Post-Op    Procedure:  Procedure(s):  RIGHT HEART CATH        Subjective:   Tremors better; on HD this am; good flows     Objective:   Vitals:  Blood pressure (!) 80/0, pulse 71, temperature 98 °F (36.7 °C), temperature source Oral, resp. rate 22, height 5' 7\" (1.702 m), weight 254 lb 6.6 oz (115.4 kg), SpO2 93 %. Temp (24hrs), Av °F (36.7 °C), Min:97.7 °F (36.5 °C), Max:98.2 °F (36.8 °C)    Hemodynamics:   CO:     CI:     CVP:     SVR:     PAP Systolic:     PAP Diastolic:     PVR:     LD40:     SCV02:      VAD Interrogation: LVAD (Heartmate)  Pump Speed (RPM): 9600  Pump Flow (LPM): 5.1  Chatter in Lines: No  PI (Pulsitility Index): 3.5  Power: 6.2  MAP: 88   Test: No  Back Up  at Bedside & Labeled: Yes  Power Module Test: No  Driveline Site Care: No  Driveline Dressing: Clean, Dry, and Intact    EKG/Rhythm:      Extubation Date / Time:     CT Output:     Ventilator:  Ventilator Volumes  Vt Spont (ml): 435 ml (21)  Ve Observed (l/min): 6.7 l/min (21)    Oxygen Therapy:  Oxygen Therapy  O2 Sat (%): 93 % (21)  Pulse via Oximetry: 71 beats per minute (21)  O2 Device: None (21)  Skin Assessment: Clean, dry, & intact (21)  Skin Protection for O2 Device: No (21)  O2 Flow Rate (L/min): 2 l/min (21 030)  FIO2 (%): 21 % (21)    CXR:    Admission Weight: Last Weight   Weight: 270 lb 6.4 oz (122.7 kg) Weight: 254 lb 6.6 oz (115.4 kg)     Intake / Output / Drain:  Current Shift: No intake/output data recorded. Last 24 hrs.:     Intake/Output Summary (Last 24 hours) at 2021 0852  Last data filed at 2021 0433  Gross per 24 hour   Intake 760 ml   Output 450 ml   Net 310 ml             No results for input(s): CPK, CKMB, TROIQ in the last 72 hours.   Recent Labs     21  0458 04/29/21 2113 04/29/21  0017 04/28/21  0258     --  137 138   K 4.2 4.3 4.0 3.8   CO2 30  --  30 30   BUN 31*  --  20 33*   CREA 2.88*  --  2.04* 2.25*   GLU 84  --  141* 79   PHOS 2.8  --  1.8* 2.2*   MG  --  2.5* 2.2 2.3   WBC 3.2*  --  3.6 3.0*   HGB 8.4*  --  8.9* 8.3*   HCT 29.5*  --  30.4* 28.5*     --  155 137*     Recent Labs     04/30/21  0457 04/29/21  0017 04/28/21  0258   INR 2.3* 2.3* 2.1*   PTP 22.9* 23.4* 21.3*     No lab exists for component: PBNP        Current Facility-Administered Medications:     warfarin (COUMADIN) tablet 3 mg, 3 mg, Oral, ONCE, Festus Ribeiro MD    iron sucrose (VENOFER) 200 mg in 0.9% sodium chloride 100 mL IVPB, 200 mg, IntraVENous, DAILY, Triny Limon NP, Last Rate: 440 mL/hr at 04/29/21 1103, 200 mg at 04/29/21 1103    bisacodyL (DULCOLAX) suppository 10 mg, 10 mg, Rectal, DAILY PRN, Triny Limon NP, 10 mg at 04/28/21 1950    benzocaine-zinc cl-benzalkonium cl (ORAJEL) 20-0.1-0.02 % mucosal gel, , Oral, PRN, Edward Ortiz MD, Given at 04/27/21 2211    lactulose (CHRONULAC) 10 gram/15 mL solution 30 mL, 20 g, Oral, DAILY, Dilcia Walter NP, 30 mL at 04/29/21 0913    heparin (porcine) 1,000 unit/mL injection 1,100 Units, 1,100 Units, InterCATHeter, DIALYSIS PRN, 1,100 Units at 04/28/21 1655 **AND** heparin (porcine) 1,000 unit/mL injection 1,400 Units, 1,400 Units, InterCATHeter, DIALYSIS PRN, Yvonne Galvez MD, 1,400 Units at 04/28/21 1655    glucose chewable tablet 16 g, 4 Tab, Oral, PRN, Edward Ortiz MD    dextrose (D50W) injection syrg 12.5-25 g, 12.5-25 g, IntraVENous, PRN, Edward Ortiz MD, 25 g at 04/26/21 2246    glucagon (GLUCAGEN) injection 1 mg, 1 mg, IntraMUSCular, PRN, Edward Ortiz MD    0.9% sodium chloride infusion 250 mL, 250 mL, IntraVENous, PRN, Andrzej Macedo NP    budesonide-formoteroL (SYMBICORT) 160-4.5 mcg/actuation HFA inhaler 1 Puff (Patient Supplied), 1 Puff, Inhalation, BID RT, Yolette Upton MD, 1 Puff at 04/30/21 0825    bumetanide (BUMEX) injection 2 mg, 2 mg, IntraVENous, TID, Polliard, Norlin Climes T, NP, 2 mg at 04/29/21 2125    potassium chloride SR (KLOR-CON 10) tablet 20 mEq, 20 mEq, Oral, DAILY, Polliard, Norlin Climes T, NP, 20 mEq at 04/29/21 0913    albuterol (PROVENTIL HFA, VENTOLIN HFA, PROAIR HFA) inhaler 2 Puff (Patient Supplied), 2 Puff, Inhalation, Q4H PRN, Yolette Upton MD    epoetin amalia-epbx (RETACRIT) injection 20,000 Units, 20,000 Units, SubCUTAneous, Q MON, WED & Benjamen Jaspreet Lr MD, 20,000 Units at 04/28/21 2214    oxymetazoline (AFRIN) 0.05 % nasal spray 2 Spray, 2 Spray, Both Nostrils, BID PRN, Ney Walterer, NP, 2 Jerome at 04/22/21 1800    sodium chloride (OCEAN) 0.65 % nasal squeeze bottle 2 Spray, 2 Spray, Both Nostrils, Q2H PRN, Ney Walterer, NP    therapeutic multivitamin (THERAGRAN) tablet 1 Tab, 1 Tab, Oral, DAILY, Polliard, Brewster Roller, NP, 1 Tab at 04/29/21 0913    polyethylene glycol (MIRALAX) packet 17 g, 17 g, Oral, DAILY, Polliard, Norlin Climes T, NP, 17 g at 04/29/21 0913    isosorbide mononitrate ER (IMDUR) tablet 30 mg, 30 mg, Oral, DAILY, Polliard, Norlin Climes T, NP, 30 mg at 04/29/21 0913    cephALEXin (KEFLEX) capsule 250 mg, 250 mg, Oral, BID, Rodolfo Wilkins MD, 250 mg at 04/29/21 1750    sodium chloride (NS) flush 5-40 mL, 5-40 mL, IntraVENous, Q8H, Pollbrian Dana T, NP, 10 mL at 04/30/21 0631    sodium chloride (NS) flush 5-40 mL, 5-40 mL, IntraVENous, PRN, Polliard, Norlin Climes T, NP, 10 mL at 04/22/21 1238    acetaminophen (TYLENOL) tablet 650 mg, 650 mg, Oral, Q4H PRN, Polliard, Dana T, NP, 650 mg at 04/29/21 2124    hydrALAZINE (APRESOLINE) 20 mg/mL injection 10 mg, 10 mg, IntraVENous, Q4H PRN, Polliard, Norlin Climes T, NP, 10 mg at 04/21/21 0465    hydrALAZINE (APRESOLINE) tablet 100 mg, 100 mg, Oral, TID, Polliard, Norlin Climes T, NP, 100 mg at 04/29/21 2124    hydrOXYzine HCL (ATARAX) tablet 10 mg, 10 mg, Oral, TID PRN, Polliard, Brewster Roller, NP    insulin glargine (LANTUS) injection 20 Units, 20 Units, SubCUTAneous, BID, Luann Walter, NP, 20 Units at 04/29/21 2125    PARoxetine (PAXIL) tablet 20 mg, 20 mg, Oral, DAILY, Judy Walterg T, NP, 20 mg at 04/29/21 0913    ranolazine ER (RANEXA) tablet 1,000 mg, 1,000 mg, Oral, BID, Matd, Judy Fang T, NP, 1,000 mg at 04/29/21 1916    tafamidis cap 61 mg (Patient Supplied), 61 mg, Oral, DAILY, Matd, Judy Fang T, NP, 61 mg at 04/29/21 1100    warfarin dosing per pharmacy, , Other, Rx Dosing/Monitoring, Noel Ball NP    miconazole (MICOTIN) 2 % powder, , Topical, BID, Shella Barthel, MD, Given at 04/29/21 1751    A/P     S/P LVAD - good flows  Need for [facility] - coumadin  Fluid overload - diuresis  Acute kidney injury - monitor      Risk of morbidity and mortality - high  Medical decision making - high complexity    Signed By: Zeenat Casper MD

## 2021-04-30 NOTE — PROGRESS NOTES
Chart reviewed and attempted to see patient for OT intervention. Patient on dialysis at this time. Will follow up as able. Thank you.

## 2021-04-30 NOTE — PROGRESS NOTES
Pharmacist Note - Warfarin Dosing  Consult provided for this 78 y. o.female to manage warfarin for LVAD    INR Goal: 1.8-2.5 per NP (lowered due to GIB)    Home regimen/ tablet size: 4 mg daily     Drugs that may increase INR: None  Drugs that may decrease INR: None  Other current anticoagulants/ drugs that may increase bleeding risk: None  Risk factors: Age > 65  Daily INR ordered: YES    Recent Labs     04/30/21  0457 04/29/21  0017 04/28/21  0258   HGB 8.4* 8.9* 8.3*   INR 2.3* 2.3* 2.1*     Date               INR                  Dose  4/16  1.6  4 mg    4/17  1.8  4 mg   4/18  1.8  4 mg   4/19  1.8  4 mg   4/20                 2.0                  4 mg  4/21                 2.1                  4 mg  4/22                 2.2                  4 mg  4/23                 2.6                  2.5 mg  4/24  2.7  2.5 mg  4/25  2.5  2.5 mg  4/26                1.8                   4 mg    4/27                1.8                   4 mg   4/28  2.1  3 mg  4/29  2.3  3 mg  4/30                2.3                   3 mg                                                                                                                                                      Assessment/ Plan: Will order warfarin 3 mg PO x 1 dose. Pharmacy will continue to monitor daily and adjust therapy as indicated. Please contact the pharmacist at x [phone] for outpatient recommendations if needed.

## 2021-04-30 NOTE — PROGRESS NOTES
[facility] in [facility],  [facility]  Inpatient Consult Progress Note      Patient name: Patrica Mcneil  Patient : 1950  Patient MRN: [id number]  Consulting MD: Barbara Nioxn MD  Date of service: 21    CHIEF COMPLAINT:  Dyspnea     PLAN:  · Continue HD for volume management; per Nephrology- will need to determine longevity of HD therapy and plan for discharge   · ID consult for pancytopenia and persistent sed rate appreciated; ?alternative abx agent - recommend against stopping antibiotics altogether due to chronic SWI/suspected pump infection and high risk of clinical deterioration if infection is not suppressed  · Neurology consult appreciated for severe myoclonal jerking; suspect metabolic encephalopathy - possible offending agents held and this has significantly improved  · Palliative Care Consult to discuss goals and limits of care including HD - patient is a poor candidate for HD due to:   · End stage HF with LVAD and RV failure  · Chronic, active infection which would likely seed HD line  · Difficult disposition if patient were to require outpatient HD   · Discussed with daughter (Sarahi), Melinda; she and the patient understand concerns regarding patient's multiple co-morbidities, each with life limiting prognosis; they wish to pursue aggressive treatment including HD (if indicated) to prolong patient's life at home; goal is to spend time with her grandson, even if this means frequent trips to HD center.       Decrease device speed to 9400rpm due to increased ectopy  TTE  shows improved LVIDd, 6.68 cm with RVIDd 5.14 cm and TAPSE 1.1 cm   TTE 21 LVIDd 7.37cm, RVIDd 5.27cm, TAPSE 1.44cm   Previously was intolerant to higher speeds due to VT; observe closely   Volume management per Nephrology  [place] for strict I/O   Appreciate SLP consult and recommendations  CXR negative 21  Intolerant to BB/ACEi/ARB/ARNI due to aTTR  Continue tafamidis 61 mg PO daily  Hydralazine 100 mg po TID  Continue Imdur 30 mg PO daily   Orthostatics negative  Will request standing weights only   Intolerant of spironolactone due to hyperkalemia, renal failure   Coumadin dose per pharmacy; goal INR lowered to 1.8-2.5 due to GIB  Continue ranolazine, no ectopy - followed by Dr. 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 Lalita Muñoz)  · C/b delayed extubation due to severe COPD  · C/b critical illness polyneuropathy  · C/b prolonged hospitalization post-LVAD, 55 days  · C/b sternal wound infection s/p debridement (by Dr. Mario Swan) s/p wound vac  · C/b sacral ulcer  · Would culture positive for Staph aureus, not MRSA  · C/b LVAD site drainage, improved  S/p CRT-ICD  · ICD fired due to electrolyte imbalance (2011)  H/o breast cancer (1992)   · s/p bilateral mastectomy/chemo and endometrial cancer s/p hysterectomy  · Lymphedema of LLE due to cancer treatment  Severe COPD with FEV1 50%  Depression  Atrial fibrillation  H/o \"two mini strokes\"  S/p fall with hip facture   · Right hip hemmiarthroplasty (5/23/18) by Dr. Ney Archuleta)  · S/p removed hip hardwarare due to pain (4/15/19)  COPD severe  CKD, stage 4  Hyperkalemia, resolved  Pulmonary hypertension  Cardiac risk factors:  · Morbid obesity, Body mass index is 42.29 kg/m². · DM2 insulin dependent  · JED on Trilogy  · HL  Urinary incontinence, severe  · no procedures due to anticoagulation  Endometrial cancer (2002)  HTN  HL     CARDIAC IMAGING:  Echo (9/24/19) LVEF 20-25%, AV opens 1:1, no AR  Echo (5/29/18) LVEF 10-%, ramps study done, report in [facility]  Echo (1/9/18) ramp study done, LVEDD 7.1cm  LHC (11/9/18) 2 vessel disease with 90% OM, 80% PDA, DSA to PDA branch  RHC 4/20 showed adequate Sal CI 3.0 but severely elevated RA pressure 24 mmHg     INTERVAL HISTORY:  Feels well today  Tremors resolved  Getting HD today  INR therapeutic   Improved volume status     LVAD INTERROGATION:  Device interrogated in person  Device function normal, normal flow, no events    LVAD   Pump Speed (RPM): 9400  Pump Flow (LPM): 6  MAP: 80  PI (Pulsitility Index): 4.6  Power: 6.2   Test: No  Back Up  at Bedside & Labeled: Yes  Power Module Test: No  Driveline Site Care: No  Driveline Dressing: Clean, Dry, and Intact  Outpatient: No  MAP in Therapeutic Range (Outpatient): Yes  Testing  Alarms Reviewed: Yes  Back up SC speed: 9400  Back up Low Speed Limit: 9000  Emergency Equipment with Patient?: Yes  Emergency procedures reviewed?: No  Drive line site inspected?: Yes(covered by dressing)  Drive line intergrity inspected?: Yes  Drive line dressing changed?: No    PHYSICAL EXAM:  Visit Vitals  BP (!) 80/0   Pulse 76   Temp 98.2 °F (36.8 °C)   Resp 24   Ht 5' 7\" (1.702 m)   Wt 254 lb 6.6 oz (115.4 kg)   SpO2 94%   BMI 39.85 kg/m²     General: Patient is well developed, more alert. Improved tremors  HEENT: Normocephalic and atraumatic. No scleral icterus. Pupils are equal, round and reactive to light and accomodation. No conjunctival injection. Oropharynx is clear. Flushed cheeks. Neck: Supple. No evidence of thyroid enlargements or lymphadenopathy. JVD: Cannot be appreciated   Lungs: Breath sounds are equal and clear bilaterally. + wheezing. Heart: Regular rate and rhythm with normal S1 and S2. No murmurs, gallops or rubs. Abdomen: Soft, no mass or tenderness. No organomegaly or hernia. Bowel sounds present. Genitourinary and rectal: deferred  Extremities: BLE + venous stasis   Neurologic: Generalized myoclonal jerking, intentional tremors, +asterixis   Psychiatric: More alert today  Skin: Warm, dry and well perfused. No lesions, nodules or rashes are noted. REVIEW OF SYSTEMS:  General: Denies fever, night sweats. Ear, nose and throat: Denies difficulty hearing, sinus problems, runny nose, post-nasal drip, ringing in ears, mouth sores, loose teeth, ear pain, nosebleeds, sore throate, facial pain or numbess  Cardiovascular: see above in the interval history  Respiratory: Denies cough, wheezing, sputum production, hemoptysis.   Gastrointestinal: Denies heartburn, constipation, intolerance to certain foods, diarrhea, abdominal pain, nausea, vomiting, difficulty swallowing, blood in stool  Kidney and bladder: Denies painful urination, frequent urination, urgency, prostate problems and impotence  Musculoskeletal: Denies joint pain, muscle weakness  Skin and hair: Denies change in existing skin lesions, hair loss or increase, breast changes    PAST MEDICAL HISTORY:  Past Medical History:   Diagnosis Date    Asthma     Cancer ([address]     breast    [facility]     endometrial    Congestive heart failure, unspecified     CRI (chronic renal insufficiency)     Depression     Diabetes ([address]     Hypertension        PAST SURGICAL HISTORY:  Past Surgical History:   Procedure Laterality Date    HX HERNIA REPAIR      HX HYSTERECTOMY      HX MASTECTOMY      IR INSERT NON TUNL CVC OVER 5 YRS  4/26/2021       FAMILY HISTORY:  No family history on file. SOCIAL HISTORY:  Social History     Socioeconomic History    Marital status:      Spouse name: Not on file    Number of children: Not on file    Years of education: Not on file    Highest education level: Not on file   Tobacco Use    Smoking status: Never Smoker    Smokeless tobacco: Never Used   Substance and Sexual Activity    Alcohol use: Not Currently       LABORATORY RESULTS:     Labs Latest Ref Rng & Units 4/30/2021 4/29/2021 4/29/2021 4/28/2021 4/27/2021 4/27/2021 4/27/2021   WBC 3.6 - 11.0 K/uL 3.2(L) - 3.6 3. 0(L) - - 3. 4(L)   RBC 3.80 - 5.20 M/uL 3.24(L) - 3.37(L) 3.15(L) - - 3.03(L)   Hemoglobin 11.5 - 16.0 g/dL 8.4(L) - 8. 9(L) 8. 3(L) - - 8. 0(L)   Hematocrit 35.0 - 47.0 % 29. 5(L) - 30. 4(L) 28. 5(L) - - 27. 6(L)   MCV 80.0 - 99.0 FL 91.0 - 90.2 90.5 - - 91.1   Platelets 261 - 258 K/uL 155 - 155 137(L) - - 125(L)   Lymphocytes 12 - 49 % 17 - 11(L) 14 - - 13   Monocytes 5 - 13 % 13 - 9 9 - - 9   Eosinophils 0 - 7 % 0 - 0 0 - - 0   Basophils 0 - 1 % 1 - 1 1 - - 0   Albumin 3.5 - 5.0 g/dL 3. 3(L) - 3. 4(L) 3. 3(L) - 3.6 3.4(L)   Calcium 8.5 - 10.1 MG/DL 8.9 - 8.4(L) 8.7 - 8.9 9.0   Glucose 65 - 100 mg/dL 84 - 141(H) 79 - 101(H) 62(L)   BUN 6 - 20 MG/DL 31(H) - 20 33(H) - 56(H) 55(H)   Creatinine 0.55 - 1.02 MG/DL 2.88(H) - 2.04(H) 2.25(H) - 3.16(H) 3.03(H)   Sodium 136 - 145 mmol/L 139 - 137 138 - 138 140   Potassium 3.5 - 5.1 mmol/L 4.2 4.3 4.0 3.8 3.8 3.9 3.8   TSH 0.36 - 3.74 uIU/mL - - - - - - -   LDH 81 - 246 U/L 176 - 228 188 - - 178   Some recent data might be hidden     Lab Results   Component Value Date/Time    TSH 5.36 (H) 04/24/2021 04:32 AM    TSH 3.99 (H) 04/17/2021 04:54 AM    TSH 2.980 10/01/2020 12:00 AM       ALLERGY:  Allergies   Allergen Reactions    Benzodiazepines Other (comments)     Respiratory issues        CURRENT MEDICATIONS:    Current Facility-Administered Medications:    warfarin (COUMADIN) tablet 3 mg, 3 mg, Oral, ONCE, Festus Ribeiro MD    bisacodyL (DULCOLAX) suppository 10 mg, 10 mg, Rectal, DAILY PRN, Triny Limon NP, 10 mg at 04/28/21 1950    benzocaine-zinc cl-benzalkonium cl (ORAJEL) 20-0.1-0.02 % mucosal gel, , Oral, PRN, Chris Howard MD, Given at 04/27/21 2211    lactulose (CHRONULAC) 10 gram/15 mL solution 30 mL, 20 g, Oral, DAILY, Polliard, Evlyn Apt T, NP, 30 mL at 04/29/21 0913    heparin (porcine) 1,000 unit/mL injection 1,100 Units, 1,100 Units, InterCATHeter, DIALYSIS PRN, 1,100 Units at 04/30/21 1105 **AND** heparin (porcine) 1,000 unit/mL injection 1,400 Units, 1,400 Units, InterCATHeter, DIALYSIS PRN, Felice Castillo MD, 1,400 Units at 04/30/21 1105    glucose chewable tablet 16 g, 4 Tab, Oral, PRN, Chris Howard MD    dextrose (D50W) injection syrg 12.5-25 g, 12.5-25 g, IntraVENous, PRN, Chris Howard MD, 25 g at 04/26/21 2246    glucagon (GLUCAGEN) injection 1 mg, 1 mg, IntraMUSCular, PRN, Chris Howard MD    0.9% sodium chloride infusion 250 mL, 250 mL, IntraVENous, PRN, Edison Chicas NP    budesonide-formoteroL (SYMBICORT) 160-4.5 mcg/actuation HFA inhaler 1 Puff (Patient Supplied), 1 Puff, Inhalation, BID RT, Chris Howard MD, 1 Puff at 04/30/21 0825    bumetanide (BUMEX) injection 2 mg, 2 mg, IntraVENous, TID, Polliard, Evlyn Apt T, NP, 2 mg at 04/30/21 1154    potassium chloride SR (KLOR-CON 10) tablet 20 mEq, 20 mEq, Oral, DAILY, Polliard, Evlyn Apt T, NP, 20 mEq at 04/30/21 0951    albuterol (PROVENTIL HFA, VENTOLIN HFA, PROAIR HFA) inhaler 2 Puff (Patient Supplied), 2 Puff, Inhalation, Q4H PRN, Chris Howard MD    epoetin amalia-epbx (RETACRIT) injection 20,000 Units, 20,000 Units, SubCUTAneous, Q MON, WED & FRI, Jaspreet Mendoza MD, 20,000 Units at 04/28/21 2214    oxymetazoline (AFRIN) 0.05 % nasal spray 2 Spray, 2 Spray, Both Nostrils, BID PRN, Meseret Walter NP, 2 [place] at 04/22/21 1800    sodium chloride (OCEAN) 0.65 % nasal squeeze bottle 2 Spray, 2 Spray, Both Nostrils, Q2H PRN, Polliard, Dominique Minium, NP    therapeutic multivitamin (THERAGRAN) tablet 1 Tab, 1 Tab, Oral, DAILY, Mikaela Walter, NP, 1 Tab at 04/30/21 0951    polyethylene glycol (MIRALAX) packet 17 g, 17 g, Oral, DAILY, Mikaela Walter, NP, 17 g at 04/29/21 0913    isosorbide mononitrate ER (IMDUR) tablet 30 mg, 30 mg, Oral, DAILY, PollMikaela torres, NP, 30 mg at 04/30/21 0951    cephALEXin (KEFLEX) capsule 250 mg, 250 mg, Oral, BID, Colby Cisse MD, 250 mg at 04/30/21 1154    sodium chloride (NS) flush 5-40 mL, 5-40 mL, IntraVENous, Q8H, Dana Walter, NP, 10 mL at 04/30/21 0631    sodium chloride (NS) flush 5-40 mL, 5-40 mL, IntraVENous, PRN, Mikaela Walter, NP, 10 mL at 04/22/21 1238    acetaminophen (TYLENOL) tablet 650 mg, 650 mg, Oral, Q4H PRN, PollAndrés torresa T, NP, 650 mg at 04/30/21 6935    hydrALAZINE (APRESOLINE) 20 mg/mL injection 10 mg, 10 mg, IntraVENous, Q4H PRN, Mikaela Walter, NP, 10 mg at 04/21/21 6070    hydrALAZINE (APRESOLINE) tablet 100 mg, 100 mg, Oral, TID, Polliard, Dominique Minium, NP, 100 mg at 04/30/21 1154    hydrOXYzine HCL (ATARAX) tablet 10 mg, 10 mg, Oral, TID PRN, Polliard, Dominique Minium, NP    insulin glargine (LANTUS) injection 20 Units, 20 Units, SubCUTAneous, BID, Polliard, Dominique Minium, NP, 20 Units at 04/30/21 0951    PARoxetine (PAXIL) tablet 20 mg, 20 mg, Oral, DAILY, Mikaela Walter NP, 20 mg at 04/30/21 1481    ranolazine ER (RANEXA) tablet 1,000 mg, 1,000 mg, Oral, BID, Mikaela Walter NP, 1,000 mg at 04/30/21 1154    tafamidis cap 61 mg (Patient Supplied), 61 mg, Oral, DAILY, Dominique Walter NP, 61 mg at 04/30/21 7790    warfarin dosing per pharmacy, , Other, Rx Dosing/Monitoring, Dominique Walter NP    miconazole (MICOTIN) 2 % powder, , Topical, BID, Colby Cisse MD, Given at 04/30/21 8248    PATIENT CARE TEAM:  Patient Care Team:  Ian Montes NP as PCP - General (Nurse Practitioner)  Beatriz Solorzano MD (Cardiology)  Tereza Sethi MD as Physician (Cardiology)     Thank you for allowing me to participate in this patient's care. Mattie Patton, NP  [facility] [address], [address]  Phone: [phone]      [facility] ATTENDING ADDENDUM    Patient was seen and examined in person. Data and notes were reviewed. I have discussed and agree with the plan as noted in the NP note above without further additions.     Isidra Núñez MD PhD  [id number]

## 2021-05-01 NOTE — PROGRESS NOTES
[facility] Hospitalist Progress Note Danyelle Pierre MD 
Answering service: [phone] OR [phone] from in house phone NAME:  Gabriel Connor :  1950 MRN:  [id number] Admission Summary:  
Gabriel Connor is a 78 y.o. female with a past medical history of heart failure s/p LVAD, COPD, CKD stage 3, pulmonary HTN, Depression, HTN, and HLD who presented to [facility] appointment with complaints of chest tightness and increasing dyspnea. She was instructed to come to [facility] for admission for further treatment. At time of examination patient experiencing dyspnea but states she's feeling 100% better than what she felt yesterday. Interval history / Subjective:  
Patient seen and examined. Feels well, no acute issues overnight, next HD Monday. Breathing comfortable in bed. Assessment & Plan:  
 
 Acute on Chronic systolic congestive heart failure , s/p LVAD 
- NYHA class IV ;  s/p LVAD heartMate 2 , increased speed per HF team  
- Advanced heart failure team following and adjusting meds. AC with warfarin  
- RHC-  to measure heart pressures  
  
COPD-  Stopped nebs- resume home inhalers- symbicort and albuterol Severe tremors- unclear etiology- stopped nebs and thyroid med Improved after blood transfusion and dialysis,  
neurology eval completed- neurontin discontinued Subclinical hypothyroidism- normal free T4 with minimally elevated TSH Do not recommend any treatment or thyroid meds for these labs- DC synthroid Constipation with KUB suggesting fecal impaction- resolved after suppository CKD stage 3 - advanced to ESRD- now on HD. Nephrology following  
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  
JED -  home trilogy, BiPAP inpatient if patient able to tolerate this Depression - cont Paroxetine H/o breast cancer (1992) - s/p bilateral mastectomy/chemo and Endometrial ca s/p hysterectomy Pancytopenia- stable but persists - Etiology unknown at this point - Hematology consulted for eval and tx 
 transfused PRBC this admission Code status: full DVT prophylaxis: warfarin- therapeutic INR Care Plan discussed with: Patient/Family Anticipated Disposition: Home w/Family Anticipated Discharge: next week- looking for rehab- maybe near [facility] that can manage LVAD, trilogy, and dialysis if needed Hospital Problems  Date Reviewed: 4/25/2021 Codes Class Noted POA Dyspnea ICD-10-CM: R06.00 
ICD-9-CM: 786.09  4/16/2021 Yes [facility] ICD-10-CM: R84.0 ICD-9-CM: 286.9  11/10/2020 Yes Open wound of left lower leg ICD-10-CM: J87.403E ICD-9-CM: 891.0  11/10/2020 Yes Anemia ICD-10-CM: D64.9 ICD-9-CM: 285.9  11/9/2020 Yes NICM (nonischemic cardiomyopathy) ([facility] ICD-10-CM: I42.8 ICD-9-CM: 425.4  11/4/2020 Yes Coronary artery disease involving native coronary artery of native heart without angina pectoris ICD-10-CM: I25.10 ICD-9-CM: 414.01  11/4/2020 Yes Chronic venous insufficiency ICD-10-CM: I87.2 ICD-9-CM: 459.81  11/4/2020 Yes Acute on chronic systolic CHF (congestive heart failure) (HCC) ICD-10-CM: P81.28 ICD-9-CM: 428.23, 428.0  10/27/2020 Yes Obesity, morbid ([facility] ICD-10-CM: E66.01 
ICD-9-CM: 278.01  10/1/2020 Yes Review of Systems: A comprehensive review of systems was negative except for that written in the HPI. Vital Signs:  
 Last 24hrs VS reviewed since prior progress note. Most recent are: 
Visit Vitals BP (!) 80/0 Pulse 79 Temp 98.2 °F (36.8 °C) Resp 23 Ht 5' 7\" (1.702 m) Wt 114.6 kg (252 lb 10.4 oz) SpO2 92% BMI 39.57 kg/m² Patient Vitals for the past 24 hrs: 
 Temp Pulse Resp SpO2 05/01/21 0825 98.2 °F (36.8 °C) 79 23 92 % 05/01/21 0308 97.7 °F (36.5 °C) 78 18 96 % 04/30/21 2349 98 °F (36.7 °C) 87 20 93 % 04/30/21 2226  82    
04/30/21 2220  85    
04/30/21 2025 98 °F (36.7 °C) 97 20 93 % 04/30/21 1535 98.2 °F (36.8 °C) 82 25   
04/30/21 1132 98.2 °F (36.8 °C) 76 24 94 % 04/30/21 1115  77 18   
04/30/21 1100  76 23   
04/30/21 1045  73 21   
04/30/21 1030  73 23   
04/30/21 1015  75 16   
04/30/21 1000  84 21   
04/30/21 0945  77 18   
04/30/21 0942  73    
04/30/21 0930  73 16  Intake/Output Summary (Last 24 hours) at 5/1/2021 7505 Last data filed at 5/1/2021 5298 Gross per 24 hour Intake 450 ml Output 2375 ml Net -1925 ml Physical Examination:  
     
Constitutional: NAD, awake and alert , obese, [employer]   
   
Resp: Decreased breath sounds, no wheezing, no creps. No accessory muscle use CV:  audible LVAD motor GI:  Soft, non distended, non tender. BS +, no hepatosplenomegaly Musculoskeletal:  mild-mod edema, warm, wounds on bilat LE Neurologic:  Moves all extremities. AAOx3, diffuse mild tremors in upper and lower extremities. Data Review:  
 Review and/or order of clinical lab test 
XR CHEST PORT 
  
INDICATION: Possible aspiration 
  
COMPARISON: 4/26/2021 at 1406 hours 
  
TECHNIQUE: Portable AP upright chest view at 0950 hours 
  
FINDINGS: The support devices are stable. There is stable cardiac silhouette 
enlargement. The pulmonary vasculature is within normal limits.  
  
The lungs and pleural spaces are clear. There is no pneumothorax. The bones and 
upper abdomen are stable. 
  
IMPRESSION 
  
Clear lungs. Stable cardiac silhouette enlargement. 
  
 
Labs:  
 
Recent Labs 05/01/21 
9009 04/30/21 
7534 WBC 3.5* 3.2* HGB 8.7* 8.4* HCT 30.1* 29.5*  
 155 Recent Labs 05/01/21 
0767 04/30/21 
6711 04/29/21 
2113 04/29/21 
0017  139  --  137  
K 4.4 4.2 4.3 4.0  
 104  --  100 CO2 29 30  --  30  
BUN 22* 31*  --  20  
CREA 2.52* 2.88*  --  2.04*  84  --  141* CA 9.0 8.9  --  8.4* MG 2.3  --  2.5* 2.2 PHOS 2.7 2.8  --  1.8* Recent Labs 05/01/21 
4558 04/30/21 
6927 04/29/21 
0017 ALT 10* 11* 10* AP 53 52 59 TBILI 0.7 0.6 0.7 TP 8.1 7.8 7.8 ALB 3.4* 3.3* 3.4*  
GLOB 4.7* 4.5* 4.4* Recent Labs 05/01/21 
8581 04/30/21 
7696 04/29/21 
0017 INR 2.5* 2.3* 2.3* PTP 24.9* 22.9* 23.4* No results for input(s): FE, TIBC, PSAT, FERR in the last 72 hours. Lab Results Component Value Date/Time Folate 10.0 10/28/2020 03:56 AM  
  
No results for input(s): PH, PCO2, PO2 in the last 72 hours. No results for input(s): CPK, CKNDX, TROIQ in the last 72 hours. No lab exists for component: CPKMB Lab Results Component Value Date/Time Cholesterol, total 129 04/16/2021 10:40 AM  
 HDL Cholesterol 33 04/16/2021 10:40 AM  
 LDL, calculated 74 04/16/2021 10:40 AM  
 Triglyceride 110 04/16/2021 10:40 AM  
 CHOL/HDL Ratio 3.9 04/16/2021 10:40 AM  
 
Lab Results Component Value Date/Time Glucose (POC) 139 (H) 05/01/2021 07:08 AM  
 Glucose (POC) 130 (H) 04/30/2021 10:28 PM  
 Glucose (POC) 141 (H) 04/30/2021 04:35 PM  
 Glucose (POC) 97 04/30/2021 12:53 PM  
 Glucose (POC) 83 04/30/2021 06:30 AM  
 
Lab Results Component Value Date/Time  Color YELLOW/STRAW 04/18/2021 05:08 PM  
 Appearance CLEAR 04/18/2021 05:08 PM  
 Specific gravity 1.016 04/18/2021 05:08 PM  
 Specific gravity 1.013 11/09/2020 12:05 AM  
 pH (UA) 6.5 04/18/2021 05:08 PM  
 Protein 30 (A) 04/18/2021 05:08 PM  
 Glucose Negative 04/18/2021 05:08 PM  
 Ketone Negative 04/18/2021 05:08 PM  
 Bilirubin Negative 04/18/2021 05:08 PM  
 Urobilinogen 0.2 04/18/2021 05:08 PM  
 Nitrites Negative 04/18/2021 05:08 PM  
 Leukocyte Esterase SMALL (A) 04/18/2021 05:08 PM  
 Epithelial cells FEW 04/18/2021 05:08 PM  
 Bacteria Negative 04/18/2021 05:08 PM  
 WBC 0-4 04/18/2021 05:08 PM  
 RBC 0-5 04/18/2021 05:08 PM  
 
 
 
Medications Reviewed:  
 
Current Facility-Administered Medications Medication Dose Route Frequency  bisacodyL (DULCOLAX) suppository 10 mg  10 mg Rectal DAILY PRN  
 benzocaine-zinc cl-benzalkonium cl (ORAJEL) 20-0.1-0.02 % mucosal gel   Oral PRN  
 lactulose (CHRONULAC) 10 gram/15 mL solution 30 mL  20 g Oral DAILY  heparin (porcine) 1,000 unit/mL injection 1,100 Units  1,100 Units InterCATHeter DIALYSIS PRN And  
 heparin (porcine) 1,000 unit/mL injection 1,400 Units  1,400 Units InterCATHeter DIALYSIS PRN  
 glucose chewable tablet 16 g  4 Tab Oral PRN  
 dextrose (D50W) injection syrg 12.5-25 g  12.5-25 g IntraVENous PRN  
 glucagon (GLUCAGEN) injection 1 mg  1 mg IntraMUSCular PRN  
 0.9% sodium chloride infusion 250 mL  250 mL IntraVENous PRN  
 budesonide-formoteroL (SYMBICORT) 160-4.5 mcg/actuation HFA inhaler 1 Puff (Patient Supplied)  1 Puff Inhalation BID RT  
 bumetanide (BUMEX) injection 2 mg  2 mg IntraVENous TID  potassium chloride SR (KLOR-CON 10) tablet 20 mEq  20 mEq Oral DAILY  albuterol (PROVENTIL HFA, VENTOLIN HFA, PROAIR HFA) inhaler 2 Puff (Patient Supplied)  2 Puff Inhalation Q4H PRN  
 epoetin amalia-epbx (RETACRIT) injection 20,000 Units  20,000 Units SubCUTAneous Q MON, WED & FRI  
 oxymetazoline (AFRIN) 0.05 % nasal spray 2 Spray  2 Spray Both Nostrils BID PRN  
 sodium chloride (OCEAN) 0.65 % nasal squeeze bottle 2 Spray  2 Spray Both Nostrils Q2H PRN  therapeutic multivitamin (THERAGRAN) tablet 1 Tab  1 Tab Oral DAILY  polyethylene glycol (MIRALAX) packet 17 g  17 g Oral DAILY  isosorbide mononitrate ER (IMDUR) tablet 30 mg  30 mg Oral DAILY  cephALEXin (KEFLEX) capsule 250 mg  250 mg Oral BID  sodium chloride (NS) flush 5-40 mL  5-40 mL IntraVENous Q8H  
 sodium chloride (NS) flush 5-40 mL  5-40 mL IntraVENous PRN  
 acetaminophen (TYLENOL) tablet 650 mg  650 mg Oral Q4H PRN  
 hydrALAZINE (APRESOLINE) 20 mg/mL injection 10 mg  10 mg IntraVENous Q4H PRN  
 hydrALAZINE (APRESOLINE) tablet 100 mg  100 mg Oral TID  hydrOXYzine HCL (ATARAX) tablet 10 mg  10 mg Oral TID PRN  
 insulin glargine (LANTUS) injection 20 Units  20 Units SubCUTAneous BID  PARoxetine (PAXIL) tablet 20 mg  20 mg Oral DAILY  ranolazine ER (RANEXA) tablet 1,000 mg  1,000 mg Oral BID  tafamidis cap 61 mg (Patient Supplied)  61 mg Oral DAILY  warfarin dosing per pharmacy   Other Rx Dosing/Monitoring  miconazole (MICOTIN) 2 % powder   Topical BID  
 
______________________________________________________________________ EXPECTED LENGTH OF STAY: 5d 7h 
ACTUAL LENGTH OF STAY:          Luz Maria Phillips MD

## 2021-05-01 NOTE — ACP (ADVANCE CARE PLANNING)
Advance Care Planning Note Name: Ayanna De La O YOB: 1950 MRN: [id number] Admission Date: 4/16/2021  9:18 AM 
 
Date of discussion: 5/1/2021 Active Diagnoses: 
 
Hospital Problems  Date Reviewed: 4/25/2021 Codes Class Noted POA Dyspnea ICD-10-CM: R06.00 
ICD-9-CM: 786.09  4/16/2021 Yes Coagulopathy ([facility] ICD-10-CM: W39.7 ICD-9-CM: 286.9  11/10/2020 Yes Open wound of left lower leg ICD-10-CM: W52.014T ICD-9-CM: 891.0  11/10/2020 Yes Anemia ICD-10-CM: D64.9 ICD-9-CM: 285.9  11/9/2020 Yes NICM (nonischemic cardiomyopathy) ([facility] ICD-10-CM: I42.8 ICD-9-CM: 425.4  11/4/2020 Yes Coronary artery disease involving native coronary artery of native heart without angina pectoris ICD-10-CM: I25.10 ICD-9-CM: 414.01  11/4/2020 Yes Chronic venous insufficiency ICD-10-CM: I87.2 ICD-9-CM: 459.81  11/4/2020 Yes Acute on chronic systolic CHF (congestive heart failure) (HCC) ICD-10-CM: B31.84 ICD-9-CM: 428.23, 428.0  10/27/2020 Yes Obesity, morbid ([facility] ICD-10-CM: E66.01 
ICD-9-CM: 278.01  10/1/2020 Yes These active diagnoses are of sufficient risk that focused discussion on advance care planning is indicated in order to allow the patient to thoughtfully consider personal goals of care, and if situations arise that prevent the ability to personally give input, to ensure appropriate representation of their personal desires for different levels and aggressiveness of care. Discussion:  
 
Persons present and participating in discussion: Ayanna De La O, Janie Ortiz MD 
 
Discussion: discussed current condition, short and long term prognosis, we talked about her heart and kidney failures. She has only one child, her daughter is her mPOA. And she has previously talked little about her wishes, we talked at length about CPR and intubation.  She was intubated x2 in past. She will be discussing with her daughter over next few days. Seems to lean towards not wanting CPR- but no final decision for now. Time Spent:  
 
Total time spent face-to-face in education and discussion: 17 minutes.   
 
Elizabeth Juarez MD 
5/1/2021 
9:33 AM

## 2021-05-01 NOTE — PROGRESS NOTES
[facility] in [place], [place]  Inpatient Consult Progress Note      Patient name: Lilly Selby  Patient : 1950  Patient MRN: [id number]  Consulting MD: Eboni Coreas MD  Date of service: 21    CHIEF COMPLAINT:  Dyspnea     PLAN:  · Continue HD for volume management; per Nephrology- will need to determine longevity of HD therapy and plan for discharge   · ID consult for pancytopenia and persistent sed rate appreciated; ?alternative abx agent - recommend against stopping antibiotics altogether due to chronic SWI/suspected pump infection and high risk of clinical deterioration if infection is not suppressed  · Neurology consult appreciated for severe myoclonal jerking; suspect metabolic encephalopathy - possible offending agents held and this has significantly improved  · Palliative Care Consult to discuss goals and limits of care including HD - patient is a poor candidate for HD due to:   ? End stage HF with LVAD and RV failure  ? Chronic, active infection which would likely seed HD line  ? Difficult disposition if patient were to require outpatient HD   ?  Discussed with daughter (Sarahi), [place]; she and the patient understand concerns regarding patient's multiple co-morbidities, each with life limiting prognosis; they wish to pursue aggressive treatment including HD (if indicated) to prolong patient's life at home; goal is to spend time with her grandson, even if this means frequent trips to HD center.       Decrease device speed to 9400rpm due to increased ectopy  TTE  shows improved LVIDd, 6.68 cm with RVIDd 5.14 cm and TAPSE 1.1 cm   TTE 21 LVIDd 7.37cm, RVIDd 5.27cm, TAPSE 1.44cm   Previously was intolerant to higher speeds due to VT; observe closely   Volume management per Nephrology  [place] for strict I/O   Appreciate SLP consult and recommendations  CXR negative 21  Intolerant to BB/ACEi/ARB/ARNI due to aTTR  Continue tafamidis 61 mg PO daily  Hydralazine 100 mg po TID  Continue Imdur 30 mg PO daily   Orthostatics negative  Will request standing weights only   Intolerant of spironolactone due to hyperkalemia, renal failure   Coumadin dose per pharmacy; goal INR lowered to 1.8-2.5 due to GIB  Continue ranolazine, no ectopy - followed by Dr. 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 Ralph Velasco)  · C/b delayed extubation due to severe COPD  · C/b critical illness polyneuropathy  · C/b prolonged hospitalization post-LVAD, 55 days  · C/b sternal wound infection s/p debridement (by Dr. Juan M Woodson) s/p wound vac  · C/b sacral ulcer  · Would culture positive for Staph aureus, not MRSA  · C/b LVAD site drainage, improved  S/p CRT-ICD  · ICD fired due to electrolyte imbalance (2011)  H/o breast cancer (1992)   · s/p bilateral mastectomy/chemo and endometrial cancer s/p hysterectomy  · Lymphedema of LLE due to cancer treatment  Severe COPD with FEV1 50%  Depression  Atrial fibrillation  H/o \"two mini strokes\"  S/p fall with hip facture   · Right hip hemmiarthroplasty (5/23/18) by Dr. Ursula Ravi)  · S/p removed hip hardwarare due to pain (4/15/19)  COPD severe  CKD, stage 4  Hyperkalemia, resolved  Pulmonary hypertension  Cardiac risk factors:  · Morbid obesity, Body mass index is 42.29 kg/m².   · DM2 insulin dependent  · JED on Trilogy  · HL  Urinary incontinence, severe  · no procedures due to anticoagulation  Endometrial cancer (2002)  HTN  HL     CARDIAC IMAGING:  Echo (9/24/19) LVEF 20-25%, AV opens 1:1, no AR  Echo (5/29/18) LVEF 10-%, ramps study done, report in [facility]  Echo (1/9/18) ramp study done, LVEDD 7.1cm  LHC (11/9/18) 2 vessel disease with 90% OM, 80% PDA, DSA to PDA branch  RHC 4/20 showed adequate Sal CI 3.0 but severely elevated RA pressure 24 mmHg     INTERVAL HISTORY:  Feels well today  Tremors resolved  Getting HD Monday  PVCs improved  PI normalized  INR therapeutic   Improved volume status      LVAD INTERROGATION:  Device interrogated in person  Device function normal, normal flow, no events    LVAD INTERROGATION:  Device interrogated in person  Device function normal, normal flow, no events  LVAD   Pump Speed (RPM): 9400  Pump Flow (LPM): 5.4  MAP: 78  PI (Pulsitility Index): 4.9  Power: 6   Test: No  Back Up  at Bedside & Labeled: Yes  Power Module Test: No  Driveline Site Care: No  Driveline Dressing: Clean, Dry, and Intact  Outpatient: No  MAP in Therapeutic Range (Outpatient): Yes  Testing  Alarms Reviewed: Yes  Back up SC speed: 9400  Back up Low Speed Limit: 9000  Emergency Equipment with Patient?: Yes  Emergency procedures reviewed?: No  Drive line site inspected?: (covered by dressing)  Drive line intergrity inspected?: Yes  Drive line dressing changed?: Yes    PHYSICAL EXAM:  Visit Vitals  BP (!) 80/0   Pulse 79   Temp 98.2 °F (36.8 °C)   Resp 23   Ht 5' 7\" (1.702 m)   Wt 252 lb 10.4 oz (114.6 kg)   SpO2 92%   BMI 39.57 kg/m²     General: Patient is well developed, well-nourished in no acute distress  HEENT: Normocephalic and atraumatic. No scleral icterus. Pupils are equal, round and reactive to light and accomodation. No conjunctival injection. Oropharynx is clear. Neck: Supple. No evidence of thyroid enlargements or lymphadenopathy. JVD: Cannot be appreciated   Lungs: Breath sounds are equal and clear bilaterally. No wheezes, rhonchi, or rales. Heart: Regular rate and rhythm with normal S1 and S2. No murmurs, gallops or rubs. Abdomen: Soft, no mass or tenderness. No organomegaly or hernia. Bowel sounds present. Genitourinary and rectal: deferred  Extremities: No cyanosis, clubbing, or edema. Neurologic: No focal sensory or motor deficits are noted. Grossly intact. Psychiatric: Awake, alert an doriented x 3. Appropriate mood and affect. Skin: Warm, dry and well perfused. No lesions, nodules or rashes are noted. REVIEW OF SYSTEMS:  General: Denies fever, night sweats. Ear, nose and throat: Denies difficulty hearing, sinus problems, runny nose, post-nasal drip, ringing in ears, mouth sores, loose teeth, ear pain, nosebleeds, sore throate, facial pain or numbess  Cardiovascular: see above in the interval history  Respiratory: Denies cough, wheezing, sputum production, hemoptysis.   Gastrointestinal: Denies heartburn, constipation, intolerance to certain foods, diarrhea, abdominal pain, nausea, vomiting, difficulty swallowing, blood in stool  Kidney and bladder: Denies painful urination, frequent urination, urgency, prostate problems and impotence  Musculoskeletal: Denies joint pain, muscle weakness  Skin and hair: Denies change in existing skin lesions, hair loss or increase, breast changes    PAST MEDICAL HISTORY:  Past Medical History:   Diagnosis Date    Asthma     Cancer [facility] [address]     breast    Cancer [facility] [address]     endometrial    Congestive heart failure, unspecified     CRI (chronic renal insufficiency)     Depression  Diabetes ([facility] [address]     Hypertension        PAST SURGICAL HISTORY:  Past Surgical History:   Procedure Laterality Date    HX HERNIA REPAIR      HX HYSTERECTOMY      HX MASTECTOMY      IR INSERT NON TUNL CVC OVER 5 YRS  4/26/2021       FAMILY HISTORY:  No family history on file. SOCIAL HISTORY:  Social History     Socioeconomic History    Marital status:      Spouse name: Not on file    Number of children: Not on file    Years of education: Not on file    Highest education level: Not on file   Tobacco Use    Smoking status: Never Smoker    Smokeless tobacco: Never Used   Substance and Sexual Activity    Alcohol use: Not Currently       LABORATORY RESULTS:     Labs Latest Ref Rng & Units 5/1/2021 4/30/2021 4/29/2021 4/29/2021 4/28/2021 4/27/2021 4/27/2021   WBC 3.6 - 11.0 K/uL 3.5(L) 3. 2(L) - 3.6 3. 0(L) - -   RBC 3.80 - 5.20 M/uL 3.32(L) 3.24(L) - 3.37(L) 3.15(L) - -   Hemoglobin 11.5 - 16.0 g/dL 8.7(L) 8.4(L) - 8. 9(L) 8. 3(L) - -   Hematocrit 35.0 - 47.0 % 30. 1(L) 29. 5(L) - 30. 4(L) 28. 5(L) - -   MCV 80.0 - 99.0 FL 90.7 91.0 - 90.2 90.5 - -   Platelets 847 - 053 K/uL 163 155 - 155 137(L) - -   Lymphocytes 12 - 49 % 11(L) 17 - 11(L) 14 - -   Monocytes 5 - 13 % 9 13 - 9 9 - -   Eosinophils 0 - 7 % 0 0 - 0 0 - -   Basophils 0 - 1 % 1 1 - 1 1 - -   Bands 0 - 6 % 8(H) - - - - - -   Albumin 3.5 - 5.0 g/dL 3.4(L) 3. 3(L) - 3. 4(L) 3. 3(L) - 3.6   Calcium 8.5 - 10.1 MG/DL 9.0 8.9 - 8.4(L) 8.7 - 8.9   Glucose 65 - 100 mg/dL 100 84 - 141(H) 79 - 101(H)   BUN 6 - 20 MG/DL 22(H) 31(H) - 20 33(H) - 56(H)   Creatinine 0.55 - 1.02 MG/DL 2.52(H) 2.88(H) - 2.04(H) 2.25(H) - 3.16(H)   Sodium 136 - 145 mmol/L 138 139 - 137 138 - 138   Potassium 3.5 - 5.1 mmol/L 4.4 4.2 4.3 4.0 3.8 3.8 3.9   TSH 0.36 - 3.74 uIU/mL - - - - - - -   LDH 81 - 246 U/L 178 176 - 228 188 - -   Some recent data might be hidden     Lab Results   Component Value Date/Time    TSH 5.36 (H) 04/24/2021 04:32 AM    TSH 3.99 (H) 04/17/2021 04:54 AM TSH 2.980 10/01/2020 12:00 AM       ALLERGY:  Allergies   Allergen Reactions    Benzodiazepines Other (comments)     Respiratory issues        CURRENT MEDICATIONS:    Current Facility-Administered Medications:     bisacodyL (DULCOLAX) suppository 10 mg, 10 mg, Rectal, DAILY PRN, BraxtonEverardoin B, NP, 10 mg at 04/28/21 1950    benzocaine-zinc cl-benzalkonium cl (ORAJEL) 20-0.1-0.02 % mucosal gel, , Oral, PRN, Luis Brizuela MD, Given at 04/27/21 2211    lactulose (CHRONULAC) 10 gram/15 mL solution 30 mL, 20 g, Oral, DAILY, Pollbrian, Eusebio MCGREGOR NP, 30 mL at 04/29/21 0913    heparin (porcine) 1,000 unit/mL injection 1,100 Units, 1,100 Units, InterCATHeter, DIALYSIS PRN, 1,100 Units at 04/30/21 1105 **AND** heparin (porcine) 1,000 unit/mL injection 1,400 Units, 1,400 Units, InterCATHeter, DIALYSIS PRN, Arlin Solorzano MD, 1,400 Units at 04/30/21 1105    glucose chewable tablet 16 g, 4 Tab, Oral, PRN, Luis Brizuela MD    dextrose (D50W) injection syrg 12.5-25 g, 12.5-25 g, IntraVENous, PRN, Luis Brizuela MD, 25 g at 04/26/21 2246    glucagon (GLUCAGEN) injection 1 mg, 1 mg, IntraMUSCular, PRN, Luis Brizuela MD    0.9% sodium chloride infusion 250 mL, 250 mL, IntraVENous, PRN, Kraig Austin NP    budesonide-formoteroL (SYMBICORT) 160-4.5 mcg/actuation HFA inhaler 1 Puff (Patient Supplied), 1 Puff, Inhalation, BID RT, Luis Brizuela MD, 1 Puff at 05/01/21 0726    bumetanide (BUMEX) injection 2 mg, 2 mg, IntraVENous, TID, Eusebio Walter NP, 2 mg at 04/30/21 2220    potassium chloride SR (KLOR-CON 10) tablet 20 mEq, 20 mEq, Oral, DAILY, Eusebio Walter T, NP, 20 mEq at 04/30/21 0951    albuterol (PROVENTIL HFA, VENTOLIN HFA, PROAIR HFA) inhaler 2 Puff (Patient Supplied), 2 Puff, Inhalation, Q4H PRN, Luis Brizuela MD    epoetin amalia-epbx (RETACRIT) injection 20,000 Units, 20,000 Units, SubCUTAneous, Q MON, WED & Silvia Yi MD, 20,000 Units at 04/30/21 0493   oxymetazoline (AFRIN) 0.05 % nasal spray 2 Spray, 2 Spray, Both Nostrils, BID PRN, Polliard, Lucina Selbye, NP, 2 Spray at 04/22/21 1800    sodium chloride (OCEAN) 0.65 % nasal squeeze bottle 2 Spray, 2 Spray, Both Nostrils, Q2H PRN, PollLucina torres, NP    therapeutic multivitamin (THERAGRAN) tablet 1 Tab, 1 Tab, Oral, DAILY, Gina Walter, NP, 1 Tab at 04/30/21 0951    polyethylene glycol (MIRALAX) packet 17 g, 17 g, Oral, DAILY, Gina Walter, NP, 17 g at 04/29/21 0913    isosorbide mononitrate ER (IMDUR) tablet 30 mg, 30 mg, Oral, DAILY, Gina Walter NP, 30 mg at 04/30/21 0951    cephALEXin (KEFLEX) capsule 250 mg, 250 mg, Oral, BID, Toribio Goodpasture, MD, 250 mg at 04/30/21 1700    sodium chloride (NS) flush 5-40 mL, 5-40 mL, IntraVENous, Q8H, Dana Walter NP, 10 mL at 05/01/21 0709    sodium chloride (NS) flush 5-40 mL, 5-40 mL, IntraVENous, PRN, Gina Walter, TEA, 10 mL at 04/22/21 1238    acetaminophen (TYLENOL) tablet 650 mg, 650 mg, Oral, Q4H PRN, Dana Walter NP, 650 mg at 05/01/21 1438    hydrALAZINE (APRESOLINE) 20 mg/mL injection 10 mg, 10 mg, IntraVENous, Q4H PRN, Neptali Walteri Mauro MCGREGOR, NP, 10 mg at 04/21/21 9128    hydrALAZINE (APRESOLINE) tablet 100 mg, 100 mg, Oral, TID, PollLucina torres, NP, 100 mg at 04/30/21 2226    hydrOXYzine HCL (ATARAX) tablet 10 mg, 10 mg, Oral, TID PRN, PollTyrone torresa Hose, NP    insulin glargine (LANTUS) injection 20 Units, 20 Units, SubCUTAneous, BID, Lucina Walter NP, 20 Units at 04/30/21 2228    PARoxetine (PAXIL) tablet 20 mg, 20 mg, Oral, DAILY, Gina Walter NP, 20 mg at 04/30/21 1741    ranolazine ER (RANEXA) tablet 1,000 mg, 1,000 mg, Oral, BID, Gina Walter NP, 1,000 mg at 04/30/21 1700    tafamidis cap 61 mg (Patient Supplied), 61 mg, Oral, DAILY, Lucina Walter NP, 61 mg at 04/30/21 9033    warfarin dosing per pharmacy, , Other, Rx Dosing/Monitoring, Lucina Walter NP    miconazole (MICOTIN) 2 % powder, , Topical, BID, Shane Del Cid MD, Given at 04/30/21 1701    PATIENT CARE TEAM:  Patient Care Team:  Nick Luna NP as PCP - General (Nurse Practitioner)  Alea Del Castillo MD (Cardiology)  Zenaida Hu MD as Physician (Cardiology)     Thank you for allowing me to participate in this patient's care.     Nay Mckeon MD PhD  [address] [place] [address], [address]  Phone: [phone]  Fax: [phone]

## 2021-05-01 NOTE — PROGRESS NOTES
Pharmacist Note - Warfarin Dosing  Consult provided for this 78 y. o.female to manage warfarin for LVAD    INR Goal: 1.8-2.5 per NP (lowered due to GIB)    Home regimen/ tablet size: 4 mg daily     Drugs that may increase INR: None  Drugs that may decrease INR: None  Other current anticoagulants/ drugs that may increase bleeding risk: None  Risk factors: Age > 65  Daily INR ordered: YES    Recent Labs     05/01/21  0314 04/30/21  0457 04/29/21  0017   HGB 8.7* 8.4* 8.9*   INR 2.5* 2.3* 2.3*     Date               INR                  Dose  4/16  1.6  4 mg    4/17  1.8  4 mg   4/18  1.8  4 mg   4/19  1.8  4 mg   4/20                 2.0                  4 mg  4/21                 2.1                  4 mg  4/22                 2.2                  4 mg  4/23                 2.6                  2.5 mg  4/24  2.7  2.5 mg  4/25  2.5  2.5 mg  4/26                1.8                   4 mg    4/27                1.8                   4 mg   4/28  2.1  3 mg  4/29  2.3  3 mg  4/30                2.3                   3 mg     5/1                  2.5                   2.5 mg                                                                                                                                                       Assessment/ Plan: Will order warfarin 2.5 mg PO x 1 dose. Pharmacy will continue to monitor daily and adjust therapy as indicated. Please contact the pharmacist at x [phone] for outpatient recommendations if needed.

## 2021-05-01 NOTE — PROGRESS NOTES
0730: Bedside shift change report received by Clarissa Gomez (offgoing nurse). Report included the following information SBAR, Kardex, Procedure Summary, Intake/Output, MAR, Recent Results, and Cardiac Rhythm paced . 1930: Pt has been sitting in chair since noon. Bedside shift change report given to Clarissa Gomez (oncoming nurse). Report included the following information SBAR, Kardex, Procedure Summary, Intake/Output, MAR, Recent Results and Cardiac Rhythm paced. -------------------  Care Plan [id number]  Problem: Falls - Risk of  Goal: *Absence of Falls  Description: Document Adonis Porcupine Fall Risk and appropriate interventions in the flowsheet.         Outcome: Progressing Towards Goal  Note: Fall Risk Interventions:  Mobility Interventions: Communicate number of staff needed for ambulation/transfer, OT consult for ADLs, Patient to call before getting OOB, PT Consult for mobility concerns         Medication Interventions: Evaluate medications/consider consulting pharmacy, Patient to call before getting OOB, Teach patient to arise slowly    Elimination Interventions: Call light in reach, Patient to call for help with toileting needs, Stay With Me (per policy), Toileting schedule/hourly rounds    History of Falls Interventions: Consult care management for discharge planning

## 2021-05-02 NOTE — PROGRESS NOTES
Pharmacist Note - Warfarin Dosing  Consult provided for this 78 y. o.female to manage warfarin for LVAD    INR Goal: 1.8-2.5 per NP (lowered due to GIB)    Home regimen/ tablet size: 4 mg daily     Drugs that may increase INR: None  Drugs that may decrease INR: None  Other current anticoagulants/ drugs that may increase bleeding risk: None  Risk factors: Age > 65  Daily INR ordered: YES    Recent Labs     05/02/21  0017 05/01/21  0314 04/30/21  0457   HGB 8.5* 8.7* 8.4*   INR 3.0* 2.5* 2.3*     Date               INR                  Dose  4/16  1.6  4 mg    4/17  1.8  4 mg   4/18  1.8  4 mg   4/19  1.8  4 mg   4/20                 2.0                  4 mg  4/21                 2.1                  4 mg  4/22                 2.2                  4 mg  4/23                 2.6                  2.5 mg  4/24  2.7  2.5 mg  4/25  2.5  2.5 mg  4/26                1.8                   4 mg    4/27                1.8                   4 mg   4/28  2.1  3 mg  4/29  2.3  3 mg  4/30                2.3                   3 mg     5/1                  2.5                   2.5 mg     5/2                  3.0                   --                                                                                                                                                     Assessment/ Plan: Will HOLD warfarin x1 dose tonight for large change in INR which is now outside goal INR. Pharmacy will continue to monitor daily and adjust therapy as indicated. Please contact the pharmacist at x [phone] for outpatient recommendations if needed.

## 2021-05-02 NOTE — PROGRESS NOTES
0730: Bedside shift change report received by Kaylen Herron (offgoing nurse). Report included the following information SBAR, Kardex, Procedure Summary, Intake/Output, MAR, Recent Results, and Cardiac Rhythm paced . 1930: Bedside shift change report given to Kaylen Herron (oncoming nurse). Report included the following information SBAR, Kardex, Procedure Summary, Intake/Output, MAR, Recent Results and Cardiac Rhythm paced. -----------------  Care Plan [id number]  Problem: Falls - Risk of  Goal: *Absence of Falls  Description: Document Rowe Maple Fall Risk and appropriate interventions in the flowsheet. Outcome: Progressing Towards Goal  Note: Fall Risk Interventions:  Mobility Interventions: Communicate number of staff needed for ambulation/transfer, OT consult for ADLs, Patient to call before getting OOB, PT Consult for mobility concerns         Medication Interventions: Evaluate medications/consider consulting pharmacy, Patient to call before getting OOB, Teach patient to arise slowly    Elimination Interventions: Call light in reach, Patient to call for help with toileting needs, Stay With Me (per policy), Toileting schedule/hourly rounds    History of Falls Interventions: Door open when patient unattended         Problem: Pressure Injury - Risk of  Goal: *Prevention of pressure injury  Description: Document Jaime Scale and appropriate interventions in the flowsheet.   Outcome: Progressing Towards Goal  Note: Pressure Injury Interventions:  Sensory Interventions: Assess changes in LOC, Assess need for specialty bed, Check visual cues for pain, Discuss PT/OT consult with provider, Keep linens dry and wrinkle-free, Maintain/enhance activity level, Pressure redistribution bed/mattress (bed type)    Moisture Interventions: Apply protective barrier, creams and emollients, Internal/External urinary devices, Minimize layers    Activity Interventions: Increase time out of bed, Pressure redistribution bed/mattress(bed type), PT/OT evaluation    Mobility Interventions: HOB 30 degrees or less, PT/OT evaluation, Pressure redistribution bed/mattress (bed type), Turn and reposition approx.  every two hours(pillow and wedges)    Nutrition Interventions: Document food/fluid/supplement intake    Friction and Shear Interventions: Lift sheet

## 2021-05-02 NOTE — PROGRESS NOTES
[address] [facility] Adult  Hospitalist Group Hospitalist Progress Note Reji Jeffery MD 
Answering service: [phone] OR [phone] from in house phone NAME:  Reji Ojeda :  1950 MRN:  [id number] Admission Summary:  
Reji Ojeda is a 78 y.o. female with a past medical history of heart failure s/p LVAD, COPD, CKD stage 3, pulmonary HTN, Depression, HTN, and HLD who presented to [facility] appointment with complaints of chest tightness and increasing dyspnea. She was instructed to come to [facility] for admission for further treatment. At time of examination patient experiencing dyspnea but states she's feeling 100% better than what she felt yesterday. Interval history / Subjective:  
Patient seen and examined. Feels well, sitting on chair. No acute events overnight. Slightly distended abdomen, no proper BM yet. Assessment & Plan:  
 
 Acute on Chronic systolic congestive heart failure , s/p LVAD 
- NYHA class IV ;  s/p LVAD heartMate 2 , increased speed per HF team  
- Advanced heart failure team following and adjusting meds. AC with warfarin  
- RHC-  to measure heart pressures  
  
COPD-  Stopped nebs- resume home inhalers- symbicort and albuterol Severe tremors- unclear etiology- stopped nebs and thyroid med Improved after blood transfusion and dialysis,  
neurology eval completed- neurontin discontinued Subclinical hypothyroidism- normal free T4 with minimally elevated TSH Do not recommend any treatment or thyroid meds for these labs- DC synthroid Constipation with KUB suggesting fecal impaction- resolved after suppository CKD stage 3 - advanced to ESRD- now on HD. Nephrology following  
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 
 transfused PRBC this admission Code status: full DVT prophylaxis: warfarin- therapeutic INR Care Plan discussed with: Patient/Family Anticipated Disposition: Home w/Family Anticipated Discharge: next week- looking for rehab- maybe near [facility] that can manage LVAD, trilogy, and dialysis if needed Hospital Problems  Date Reviewed: 4/25/2021 Codes Class Noted POA Dyspnea ICD-10-CM: R06.00 
ICD-9-CM: 786.09  4/16/2021 Yes [facility] ICD-10-CM: M64.2 ICD-9-CM: 286.9  11/10/2020 Yes Open wound of left lower leg ICD-10-CM: G80.360K ICD-9-CM: 891.0  11/10/2020 Yes Anemia ICD-10-CM: D64.9 ICD-9-CM: 285.9  11/9/2020 Yes NICM (nonischemic cardiomyopathy) ([facility] ICD-10-CM: I42.8 ICD-9-CM: 425.4  11/4/2020 Yes Coronary artery disease involving native coronary artery of native heart without angina pectoris ICD-10-CM: I25.10 ICD-9-CM: 414.01  11/4/2020 Yes Chronic venous insufficiency ICD-10-CM: I87.2 ICD-9-CM: 459.81  11/4/2020 Yes Acute on chronic systolic CHF (congestive heart failure) (HCC) ICD-10-CM: Q98.65 ICD-9-CM: 428.23, 428.0  10/27/2020 Yes Obesity, morbid ([facility] ICD-10-CM: E66.01 
ICD-9-CM: 278.01  10/1/2020 Yes Review of Systems: A comprehensive review of systems was negative except for that written in the HPI. Vital Signs:  
 Last 24hrs VS reviewed since prior progress note. Most recent are: 
Visit Vitals BP (!) 80/0 Pulse 74 Temp 98.2 °F (36.8 °C) Resp 16 Ht 5' 7\" (1.702 m) Wt 131.1 kg (289 lb 0.4 oz) SpO2 94% BMI 45.27 kg/m² Patient Vitals for the past 24 hrs: 
 Temp Pulse Resp SpO2  
05/02/21 0744 98.2 °F (36.8 °C) 74 16 94 % 05/02/21 0500 97.7 °F (36.5 °C) 77 16 95 % 05/01/21 2340 97.7 °F (36.5 °C) 87 16 93 % 05/01/21 2100 97.7 °F (36.5 °C) 79 20 94 % 05/01/21 1514 97.8 °F (36.6 °C) 79 22   
05/01/21 1100 98.2 °F (36.8 °C) 83 20 92 % Intake/Output Summary (Last 24 hours) at 5/2/2021 9096 Last data filed at 5/2/2021 0500 Gross per 24 hour Intake 820 ml Output 425 ml Net 395 ml Physical Examination:  
     
Constitutional: NAD, awake and alert , obese, [employer]   
   
Resp: Decreased breath sounds, no wheezing, no creps. No accessory muscle use CV:  audible LVAD motor GI:  Soft, mild distended, non tender. BS +, no hepatosplenomegaly Musculoskeletal:  mild-mod edema, warm, wounds on bilat LE Neurologic:  Moves all extremities. AAOx3, diffuse mild tremors in upper and lower extremities. Data Review:  
 Review and/or order of clinical lab test 
XR CHEST PORT 
  
INDICATION: Possible aspiration 
  
COMPARISON: 4/26/2021 at 1406 hours 
  
TECHNIQUE: Portable AP upright chest view at 0950 hours 
  
FINDINGS: The support devices are stable. There is stable cardiac silhouette 
enlargement. The pulmonary vasculature is within normal limits.  
  
The lungs and pleural spaces are clear. There is no pneumothorax. The bones and 
upper abdomen are stable. 
  
IMPRESSION 
  
Clear lungs. Stable cardiac silhouette enlargement. 
  
 
Labs:  
 
Recent Labs 05/02/21 
0017 05/01/21 
5964 WBC 3.2* 3.5* HGB 8.5* 8.7* HCT 29.2* 30.1*  
* 163 Recent Labs 05/02/21 
0017 05/01/21 
4831 04/30/21 
0457 04/29/21 
2113  138 139  --   
K 4.4 4.4 4.2 4.3  105 104  --   
CO2 29 29 30  --   
BUN 33* 22* 31*  --   
CREA 3.16* 2.52* 2.88*  --   
* 100 84  --   
CA 9.0 9.0 8.9  --   
MG  --  2.3  --  2.5* PHOS 3.3 2.7 2.8  --   
 
Recent Labs 05/02/21 
0017 05/01/21 
4577 04/30/21 
3608 ALT 10* 10* 11* AP 48 53 52 TBILI 0.7 0.7 0.6 TP 7.8 8.1 7.8 ALB 3.4* 3.4* 3.3*  
GLOB 4.4* 4.7* 4.5* Recent Labs 05/02/21 
0017 05/01/21 
6410 04/30/21 
9151 INR 3.0* 2.5* 2.3* PTP 29.5* 24.9* 22.9* No results for input(s): FE, TIBC, PSAT, FERR in the last 72 hours. Lab Results Component Value Date/Time Folate 10.0 10/28/2020 03:56 AM  
  
No results for input(s): PH, PCO2, PO2 in the last 72 hours. No results for input(s): CPK, CKNDX, TROIQ in the last 72 hours. No lab exists for component: CPKMB Lab Results Component Value Date/Time Cholesterol, total 129 04/16/2021 10:40 AM  
 HDL Cholesterol 33 04/16/2021 10:40 AM  
 LDL, calculated 74 04/16/2021 10:40 AM  
 Triglyceride 110 04/16/2021 10:40 AM  
 CHOL/HDL Ratio 3.9 04/16/2021 10:40 AM  
 
Lab Results Component Value Date/Time Glucose (POC) 85 05/02/2021 06:35 AM  
 Glucose (POC) 113 (H) 05/01/2021 09:23 PM  
 Glucose (POC) 121 (H) 05/01/2021 11:55 AM  
 Glucose (POC) 139 (H) 05/01/2021 07:08 AM  
 Glucose (POC) 130 (H) 04/30/2021 10:28 PM  
 
Lab Results Component Value Date/Time Color YELLOW/STRAW 04/18/2021 05:08 PM  
 Appearance CLEAR 04/18/2021 05:08 PM  
 Specific gravity 1.016 04/18/2021 05:08 PM  
 Specific gravity 1.013 11/09/2020 12:05 AM  
 pH (UA) 6.5 04/18/2021 05:08 PM  
 Protein 30 (A) 04/18/2021 05:08 PM  
 Glucose Negative 04/18/2021 05:08 PM  
 Ketone Negative 04/18/2021 05:08 PM  
 Bilirubin Negative 04/18/2021 05:08 PM  
 Urobilinogen 0.2 04/18/2021 05:08 PM  
 Nitrites Negative 04/18/2021 05:08 PM  
 Leukocyte Esterase SMALL (A) 04/18/2021 05:08 PM  
 Epithelial cells FEW 04/18/2021 05:08 PM  
 Bacteria Negative 04/18/2021 05:08 PM  
 WBC 0-4 04/18/2021 05:08 PM  
 RBC 0-5 04/18/2021 05:08 PM  
 
 
 
Medications Reviewed:  
 
Current Facility-Administered Medications Medication Dose Route Frequency  bisacodyL (DULCOLAX) suppository 10 mg  10 mg Rectal DAILY PRN  
 benzocaine-zinc cl-benzalkonium cl (ORAJEL) 20-0.1-0.02 % mucosal gel   Oral PRN  
 lactulose (CHRONULAC) 10 gram/15 mL solution 30 mL  20 g Oral DAILY  heparin (porcine) 1,000 unit/mL injection 1,100 Units  1,100 Units InterCATHeter DIALYSIS PRN And  
 heparin (porcine) 1,000 unit/mL injection 1,400 Units  1,400 Units InterCATHeter DIALYSIS PRN  
 glucose chewable tablet 16 g  4 Tab Oral PRN  
 dextrose (D50W) injection syrg 12.5-25 g  12.5-25 g IntraVENous PRN  
 glucagon (GLUCAGEN) injection 1 mg  1 mg IntraMUSCular PRN  
 0.9% sodium chloride infusion 250 mL  250 mL IntraVENous PRN  
 budesonide-formoteroL (SYMBICORT) 160-4.5 mcg/actuation HFA inhaler 1 Puff (Patient Supplied)  1 Puff Inhalation BID RT  
 bumetanide (BUMEX) injection 2 mg  2 mg IntraVENous TID  potassium chloride SR (KLOR-CON 10) tablet 20 mEq  20 mEq Oral DAILY  albuterol (PROVENTIL HFA, VENTOLIN HFA, PROAIR HFA) inhaler 2 Puff (Patient Supplied)  2 Puff Inhalation Q4H PRN  
 epoetin amalia-epbx (RETACRIT) injection 20,000 Units  20,000 Units SubCUTAneous Q MON, WED & FRI  
 oxymetazoline (AFRIN) 0.05 % nasal spray 2 Spray  2 Spray Both Nostrils BID PRN  
 sodium chloride (OCEAN) 0.65 % nasal squeeze bottle 2 Spray  2 Spray Both Nostrils Q2H PRN  therapeutic multivitamin (THERAGRAN) tablet 1 Tab  1 Tab Oral DAILY  isosorbide mononitrate ER (IMDUR) tablet 30 mg  30 mg Oral DAILY  cephALEXin (KEFLEX) capsule 250 mg  250 mg Oral BID  sodium chloride (NS) flush 5-40 mL  5-40 mL IntraVENous Q8H  
 sodium chloride (NS) flush 5-40 mL  5-40 mL IntraVENous PRN  
 acetaminophen (TYLENOL) tablet 650 mg  650 mg Oral Q4H PRN  
 hydrALAZINE (APRESOLINE) 20 mg/mL injection 10 mg  10 mg IntraVENous Q4H PRN  
 hydrALAZINE (APRESOLINE) tablet 100 mg  100 mg Oral TID  hydrOXYzine HCL (ATARAX) tablet 10 mg  10 mg Oral TID PRN  
 insulin glargine (LANTUS) injection 20 Units  20 Units SubCUTAneous BID  PARoxetine (PAXIL) tablet 20 mg  20 mg Oral DAILY  ranolazine ER (RANEXA) tablet 1,000 mg  1,000 mg Oral BID  tafamidis cap 61 mg (Patient Supplied)  61 mg Oral DAILY  warfarin dosing per pharmacy   Other Rx Dosing/Monitoring  miconazole (MICOTIN) 2 % powder   Topical BID  
 
______________________________________________________________________ EXPECTED LENGTH OF STAY: 5d 7h 
ACTUAL LENGTH OF STAY:          Sin Larsen MD

## 2021-05-02 NOTE — PROGRESS NOTES
[facility] in [place], [place]  Inpatient Consult Progress Note      Patient name: Lang Andrews  Patient : 1950  Patient MRN: [id number]  Consulting MD: Nannette Levy MD  Date of service: 21    CHIEF COMPLAINT:  Dyspnea     PLAN:  · Continue HD for volume management; per Nephrology- will need to determine longevity of HD therapy and plan for discharge   · ID consult for pancytopenia and persistent sed rate appreciated; ?alternative abx agent - recommend against stopping antibiotics altogether due to chronic SWI/suspected pump infection and high risk of clinical deterioration if infection is not suppressed  · Neurology consult appreciated for severe myoclonal jerking; suspect metabolic encephalopathy - possible offending agents held and this has significantly improved  · Palliative Care Consult to discuss goals and limits of care including HD - patient is a poor candidate for HD due to:   ? End stage HF with LVAD and RV failure  ? Chronic, active infection which would likely seed HD line  ? Difficult disposition if patient were to require outpatient HD   ?  Discussed with daughter (mPOA), Linda Huerta; she and the patient understand concerns regarding patient's multiple co-morbidities, each with life limiting prognosis; they wish to pursue aggressive treatment including HD (if indicated) to prolong patient's life at home; goal is to spend time with her grandson, even if this means frequent trips to HD center.       Decreased device speed to 9400rpm due to increased ectopy  Continue hydralazine 100 mg po TID and imdur 30 mg PO daily  Continue tafamidis 61 mg PO daily  Intolerant to BB/ACEi/ARB/ARNI due to aTTR  Intolerant to MRA due to hyperkalemia  Volume management per Nephrology; sahu for strict I/O, standing weights  Appreciate SLP consult and recommendations  Coumadin dose per pharmacy; goal INR lowered to 1.8-2.5 due to GIB  Continue ranolazine, no ectopy - followed by Dr. 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 Bob Odom)  · C/b delayed extubation due to severe COPD  · C/b critical illness polyneuropathy  · C/b prolonged hospitalization post-LVAD, 55 days  · C/b sternal wound infection s/p debridement (by Dr. Cathy Cabrera) s/p wound vac  · C/b sacral ulcer  · Would culture positive for Staph aureus, not MRSA  · C/b LVAD site drainage, improved  S/p CRT-ICD  · ICD fired due to electrolyte imbalance (2011)  H/o breast cancer (1992)   · s/p bilateral mastectomy/chemo and endometrial cancer s/p hysterectomy  · Lymphedema of LLE due to cancer treatment  Severe COPD with FEV1 50%  Depression  Atrial fibrillation  H/o \"two mini strokes\"  S/p fall with hip facture   · Right hip hemmiarthroplasty (5/23/18) by Dr. Oneyda Porras)  · S/p removed hip hardwarare due to pain (4/15/19)  COPD severe  CKD, stage 4  Hyperkalemia, resolved  Pulmonary hypertension  Cardiac risk factors:  · Morbid obesity, Body mass index is 42.29 kg/m². · DM2 insulin dependent  · JED on Trilogy  · HL  Urinary incontinence, severe  · no procedures due to anticoagulation  Endometrial cancer (2002)  HTN  HL     CARDIAC IMAGING:  TTE 4/19 shows improved LVIDd, 6.68 cm with RVIDd 5.14 cm and TAPSE 1.1 cm   TTE 4/28/21 LVIDd 7.37cm, RVIDd 5.27cm, TAPSE 1.44cm   Echo (9/24/19) LVEF 20-25%, AV opens 1:1, no AR  Echo (5/29/18) LVEF 10-%, ramps study done, report in [facility]  Echo (1/9/18) ramp study done, LVEDD 7.1cm  LHC (11/9/18) 2 vessel disease with 90% OM, 80% PDA, DSA to PDA branch  RHC 4/20 showed adequate Sal CI 3.0 but severely elevated RA pressure 24 mmHg  CXR negative 4/27/21     INTERVAL HISTORY:  Feels well today  Tremors resolved  Getting HD Monday  PVCs improved  PI normalized  INR therapeutic   Improved volume status     LVAD INTERROGATION:  Device interrogated in person  Device function normal, normal flow, no events  LVAD   Pump Speed (RPM): 9400  Pump Flow (LPM): 5.1  MAP: 82  PI (Pulsitility Index): 5.2  Power: 5.7   Test: No  Back Up  at Bedside & Labeled: Yes  Power Module Test: No  Driveline Site Care: No  Driveline Dressing: Clean, Dry, and Intact  Outpatient: No  MAP in Therapeutic Range (Outpatient): Yes  Testing  Alarms Reviewed: Yes  Back up SC speed: 9400  Back up Low Speed Limit: 9000  Emergency Equipment with Patient?: Yes  Emergency procedures reviewed?: No  Drive line site inspected?: (covered by dressing)  Drive line intergrity inspected?: Yes  Drive line dressing changed?: Yes(per orders; pt tolerated well)    PHYSICAL EXAM:  Visit Vitals  BP (!) 80/0   Pulse 77   Temp 97.7 °F (36.5 °C)   Resp 16   Ht 5' 7\" (1.702 m)   Wt 252 lb 10.4 oz (114.6 kg)   SpO2 95%   BMI 39.57 kg/m²     General: Patient is well developed, well-nourished in no acute distress  HEENT: Normocephalic and atraumatic. No scleral icterus. Pupils are equal, round and reactive to light and accomodation.  No conjunctival injection. Oropharynx is clear. Neck: Supple. No evidence of thyroid enlargements or lymphadenopathy. JVD: Cannot be appreciated   Lungs: Breath sounds are equal and clear bilaterally. No wheezes, rhonchi, or rales. Heart: Regular rate and rhythm with normal S1 and S2. No murmurs, gallops or rubs. Abdomen: Soft, no mass or tenderness. No organomegaly or hernia. Bowel sounds present. Genitourinary and rectal: deferred  Extremities: No cyanosis, clubbing, or edema. Neurologic: No focal sensory or motor deficits are noted. Grossly intact. Psychiatric: Awake, alert an doriented x 3. Appropriate mood and affect. Skin: Warm, dry and well perfused. No lesions, nodules or rashes are noted. REVIEW OF SYSTEMS:  General: Denies fever, night sweats. Ear, nose and throat: Denies difficulty hearing, sinus problems, runny nose, post-nasal drip, ringing in ears, mouth sores, loose teeth, ear pain, nosebleeds, sore throate, facial pain or numbess  Cardiovascular: see above in the interval history  Respiratory: Denies cough, wheezing, sputum production, hemoptysis.   Gastrointestinal: Denies heartburn, constipation, intolerance to certain foods, diarrhea, abdominal pain, nausea, vomiting, difficulty swallowing, blood in stool  Kidney and bladder: Denies painful urination, frequent urination, urgency, prostate problems and impotence  Musculoskeletal: Denies joint pain, muscle weakness  Skin and hair: Denies change in existing skin lesions, hair loss or increase, breast changes    PAST MEDICAL HISTORY:  Past Medical History:   Diagnosis Date    Asthma     Cancer ([facility]     breast    Cancer ([facility]     endometrial    Congestive heart failure, unspecified     CRI (chronic renal insufficiency)     Depression     Diabetes ([facility]     Hypertension        PAST SURGICAL HISTORY:  Past Surgical History:   Procedure Laterality Date    HX HERNIA REPAIR      HX HYSTERECTOMY      HX MASTECTOMY      IR INSERT NON TUNL CVC OVER 5 YRS  4/26/2021       FAMILY HISTORY:  No family history on file. SOCIAL HISTORY:  Social History     Socioeconomic History    Marital status:      Spouse name: Not on file    Number of children: Not on file    Years of education: Not on file    Highest education level: Not on file   Tobacco Use    Smoking status: Never Smoker    Smokeless tobacco: Never Used   Substance and Sexual Activity    Alcohol use: Not Currently       LABORATORY RESULTS:     Labs Latest Ref Rng & Units 5/2/2021 5/1/2021 4/30/2021 4/29/2021 4/29/2021 4/28/2021 4/27/2021   WBC 3.6 - 11.0 K/uL 3.2(L) 3.5(L) 3. 2(L) - 3.6 3. 0(L) -   RBC 3.80 - 5.20 M/uL 3.24(L) 3.32(L) 3.24(L) - 3.37(L) 3.15(L) -   Hemoglobin 11.5 - 16.0 g/dL 8.5(L) 8.7(L) 8.4(L) - 8. 9(L) 8. 3(L) -   Hematocrit 35.0 - 47.0 % 29. 2(L) 30. 1(L) 29. 5(L) - 30. 4(L) 28. 5(L) -   MCV 80.0 - 99.0 FL 90.1 90.7 91.0 - 90.2 90.5 -   Platelets 737 - 475 K/uL 145(L) 163 155 - 155 137(L) -   Lymphocytes 12 - 49 % 13 11(L) 17 - 11(L) 14 -   Monocytes 5 - 13 % 10 9 13 - 9 9 -   Eosinophils 0 - 7 % 0 0 0 - 0 0 -   Basophils 0 - 1 % 1 1 1 - 1 1 -   Bands 0 - 6 % - 8(H) - - - - -   Albumin 3.5 - 5.0 g/dL 3.4(L) 3.4(L) 3. 3(L) - 3. 4(L) 3. 3(L) -   Calcium 8.5 - 10.1 MG/DL 9.0 9.0 8.9 - 8.4(L) 8.7 -   Glucose 65 - 100 mg/dL 109(H) 100 84 - 141(H) 79 -   BUN 6 - 20 MG/DL 33(H) 22(H) 31(H) - 20 33(H) -   Creatinine 0.55 - 1.02 MG/DL 3.16(H) 2.52(H) 2.88(H) - 2.04(H) 2.25(H) -   Sodium 136 - 145 mmol/L 137 138 139 - 137 138 -   Potassium 3.5 - 5.1 mmol/L 4.4 4.4 4.2 4.3 4.0 3.8 3.8   TSH 0.36 - 3.74 uIU/mL - - - - - - -   LDH 81 - 246 U/L 194 178 176 - 228 188 -   Some recent data might be hidden     Lab Results   Component Value Date/Time    TSH 5.36 (H) 04/24/2021 04:32 AM    TSH 3.99 (H) 04/17/2021 04:54 AM    TSH 2.980 10/01/2020 12:00 AM       ALLERGY:  Allergies   Allergen Reactions    Benzodiazepines Other (comments)     Respiratory issues        CURRENT MEDICATIONS:    Current Facility-Administered Medications:     bisacodyL (DULCOLAX) suppository 10 mg, 10 mg, Rectal, DAILY PRN, Triny Limon, NP, 10 mg at 04/28/21 1950    benzocaine-zinc cl-benzalkonium cl (ORAJEL) 20-0.1-0.02 % mucosal gel, , Oral, PRN, Kevin Lopez MD, Given at 04/27/21 2211    lactulose (CHRONULAC) 10 gram/15 mL solution 30 mL, 20 g, Oral, DAILY, Polliard, Daren Fore T, NP, 30 mL at 04/29/21 0913    heparin (porcine) 1,000 unit/mL injection 1,100 Units, 1,100 Units, InterCATHeter, DIALYSIS PRN, 1,100 Units at 04/30/21 1105 **AND** heparin (porcine) 1,000 unit/mL injection 1,400 Units, 1,400 Units, InterCATHeter, DIALYSIS PRN, Zunilda Fernandez MD, 1,400 Units at 04/30/21 1105    glucose chewable tablet 16 g, 4 Tab, Oral, PRN, Kevin Lopez MD    dextrose (D50W) injection syrg 12.5-25 g, 12.5-25 g, IntraVENous, PRN, Kevin Lopez MD, 25 g at 04/26/21 2246    glucagon (GLUCAGEN) injection 1 mg, 1 mg, IntraMUSCular, PRN, Kevin Lopez MD    0.9% sodium chloride infusion 250 mL, 250 mL, IntraVENous, PRN, Gail Monreal NP    budesonide-formoteroL (SYMBICORT) 160-4.5 mcg/actuation HFA inhaler 1 Puff (Patient Supplied), 1 Puff, Inhalation, BID RT, Kevin Lopez MD, 1 Puff at 05/01/21 2046    bumetanide (BUMEX) injection 2 mg, 2 mg, IntraVENous, TID, Jose Angel Laffernando Oka, NP, 2 mg at 05/01/21 2117    potassium chloride SR (KLOR-CON 10) tablet 20 mEq, 20 mEq, Oral, DAILY, Polliard, Daren Fore T, NP, 20 mEq at 05/01/21 0920    albuterol (PROVENTIL HFA, VENTOLIN HFA, PROAIR HFA) inhaler 2 Puff (Patient Supplied), 2 Puff, Inhalation, Q4H PRN, Kevin Lopez MD    epoetin amalia-epbx (RETACRIT) injection 20,000 Units, 20,000 Units, SubCUTAneous, Q MON, WED & FRI, Jaspreet Mendoza MD, 20,000 Units at 04/30/21 2243    oxymetazoline (AFRIN) 0.05 % nasal spray 2 Spray, 2 Spray, Both Nostrils, BID PRN, Sarah Walter NP, 2 [place] at 04/22/21 1800    sodium chloride (OCEAN) 0.65 % nasal squeeze bottle 2 Spray, 2 Spray, Both Nostrils, Q2H PRN, Lon Waltere Charlie, NP    therapeutic multivitamin (THERAGRAN) tablet 1 Tab, 1 Tab, Oral, DAILY, Leana Walters T, NP, 1 Tab at 05/01/21 0920    isosorbide mononitrate ER (IMDUR) tablet 30 mg, 30 mg, Oral, DAILY, Leeann Walterle Dials T, NP, 30 mg at 05/01/21 0920    cephALEXin (KEFLEX) capsule 250 mg, 250 mg, Oral, BID, Sierra Bean MD, 250 mg at 05/01/21 1744    sodium chloride (NS) flush 5-40 mL, 5-40 mL, IntraVENous, Q8H, Leana Walter Dials T, NP, 10 mL at 05/01/21 2119    sodium chloride (NS) flush 5-40 mL, 5-40 mL, IntraVENous, PRN, Leana Walters T, NP, 10 mL at 04/22/21 1238    acetaminophen (TYLENOL) tablet 650 mg, 650 mg, Oral, Q4H PRN, Lon Waltere Charlie, NP, 650 mg at 05/01/21 2118    hydrALAZINE (APRESOLINE) 20 mg/mL injection 10 mg, 10 mg, IntraVENous, Q4H PRN, Polliard, Leeannle Dials T, NP, 10 mg at 04/21/21 7382    hydrALAZINE (APRESOLINE) tablet 100 mg, 100 mg, Oral, TID, Lon Waltere Charlie, NP, 100 mg at 05/01/21 2117    hydrOXYzine HCL (ATARAX) tablet 10 mg, 10 mg, Oral, TID PRN, Kevin Walteritte Charlie, NP    insulin glargine (LANTUS) injection 20 Units, 20 Units, SubCUTAneous, BID, Kevin Walteritte Charlie, NP, 20 Units at 05/01/21 2128    PARoxetine (PAXIL) tablet 20 mg, 20 mg, Oral, DAILY, FrankiarLeeann tellezle Dials T, NP, 20 mg at 05/01/21 0920    ranolazine ER (RANEXA) tablet 1,000 mg, 1,000 mg, Oral, BID, Leana Walter NP, 1,000 mg at 05/01/21 1744    tafamidis cap 61 mg (Patient Supplied), 61 mg, Oral, DAILY, Nayeli Walter NP, 61 mg at 05/01/21 0900    warfarin dosing per pharmacy, , Other, Rx Dosing/Monitoring, Nayeli Walter NP    miconazole (MICOTIN) 2 % powder, , Topical, BID, Sierra Bean MD, Given at 05/01/21 1744    PATIENT CARE TEAM:  Patient Care Team:  Angie Garcia NP as PCP - General (Nurse Practitioner)  Gricel Valenzuela MD (Cardiology)  Daniella Douglas MD as Physician (Cardiology)     Thank you for allowing me to participate in this patient's care.     Keira Thompson MD PhD  [address] [place] [address], [address]  Phone: [phone]  Fax: [phone]

## 2021-05-03 NOTE — PROGRESS NOTES
[facility] [facility] Adult  Hospitalist Group Hospitalist Progress Note Tara Tilley MD 
Answering service: [phone] OR [phone] from in house phone NAME:  Royal Valadez :  1950 MRN:  [id number] Admission Summary:  
Royal Valadez is a 78 y.o. female with a past medical history of heart failure s/p LVAD, COPD, CKD stage 3, pulmonary HTN, Depression, HTN, and HLD who presented to [facility] appointment with complaints of chest tightness and increasing dyspnea. She was instructed to come to [facility] for admission for further treatment. At time of examination patient experiencing dyspnea but states she's feeling 100% better than what she felt yesterday. Interval history / Subjective:  
Patient seen and examined. Feels well, no acute events. Having dialysis, describes gas, chronic, with some constipation, recently had some laxatives that led to diarrhea, conscious of that. Assessment & Plan: #. Acute on Chronic systolic congestive heart failure , s/p LVAD 
- NYHA class IV ;  s/p LVAD heartMate 2 , increased speed per HF team  
- Advanced heart failure team following and adjusting meds. AC with warfarin  
- RHC-  to measure heart pressures CKD stage 3 - advanced to ESRD- now on HD. Nephrology following - permCath . COPD-  Stopped nebs- resume home inhalers- symbicort and albuterol Severe tremors- unclear etiology- stopped nebs and thyroid med Improved after blood transfusion and dialysis,  
neurology eval completed- neurontin discontinued Subclinical hypothyroidism- normal free T4 with minimally elevated TSH Do not recommend any treatment or thyroid meds for these labs- DC synthroid Constipation with KUB suggesting fecal impaction- resolved after suppository DM - A1c 7.2 - Monitor BG AC/HS - Continue lantus , accuchecks and ss insulin BLE edema with ulcers- improved - Elevate legs- Wound care following Chronic sternal wound - On antibiotics chronic per ID  
JED -  home trilogy, BiPAP inpatient if patient able to tolerate this Depression - cont Paroxetine H/o breast cancer (1992) - s/p bilateral mastectomy/chemo and Endometrial ca s/p hysterectomy Pancytopenia- stable- Etiology unknown - Hematology evaluated- transfused RBCs Code status: full DVT prophylaxis: warfarin- therapeutic INR Care Plan discussed with: Patient/Family Dispo: - looking for rehab- maybe near [facility] that can manage LVAD, trilogy, and HD Hospital Problems  Date Reviewed: 4/25/2021 Codes Class Noted POA Dyspnea ICD-10-CM: R06.00 
ICD-9-CM: 786.09  4/16/2021 Yes Coagulopathy ([facility] ICD-10-CM: C95.8 ICD-9-CM: 286.9  11/10/2020 Yes Open wound of left lower leg ICD-10-CM: L13.747F ICD-9-CM: 891.0  11/10/2020 Yes Anemia ICD-10-CM: D64.9 ICD-9-CM: 285.9  11/9/2020 Yes NICM (nonischemic cardiomyopathy) ([facility] ICD-10-CM: I42.8 ICD-9-CM: 425.4  11/4/2020 Yes Coronary artery disease involving native coronary artery of native heart without angina pectoris ICD-10-CM: I25.10 ICD-9-CM: 414.01  11/4/2020 Yes Chronic venous insufficiency ICD-10-CM: I87.2 ICD-9-CM: 459.81  11/4/2020 Yes Acute on chronic systolic CHF (congestive heart failure) (HCC) ICD-10-CM: X57.80 ICD-9-CM: 428.23, 428.0  10/27/2020 Yes Obesity, morbid ([facility] ICD-10-CM: E66.01 
ICD-9-CM: 278.01  10/1/2020 Yes Review of Systems: A comprehensive review of systems was negative except for that written in the HPI. Vital Signs:  
 Last 24hrs VS reviewed since prior progress note. Most recent are: 
Visit Vitals BP (!) 80/0 Pulse 73 Temp 98.2 °F (36.8 °C) (Oral) Resp 16 Ht 5' 7\" (1.702 m) Wt 131.1 kg (289 lb 0.4 oz) SpO2 94% BMI 45.27 kg/m² Patient Vitals for the past 24 hrs: 
 Temp Pulse Resp SpO2  
05/03/21 1030  73 16 94 % 05/03/21 1015  71 16 95 % 05/03/21 1000  70 16 95 % 05/03/21 0950 98.2 °F (36.8 °C) 72 16 96 % 05/03/21 0849 98.2 °F (36.8 °C) 72 18 95 % 05/03/21 0300 98.3 °F (36.8 °C) 79 18 93 % 05/02/21 2330 98.4 °F (36.9 °C) 85 16 95 % 05/02/21 1921 98.3 °F (36.8 °C) 79 16 95 % 05/02/21 1501 98.3 °F (36.8 °C) 79 14 93 % 05/02/21 1100 98.7 °F (37.1 °C) 80 16 94 % Intake/Output Summary (Last 24 hours) at 5/3/2021 1044 Last data filed at 5/3/2021 4246 Gross per 24 hour Intake 480 ml Output 925 ml Net -445 ml Physical Examination:  
     
Constitutional: NAD, awake and alert , obese, [employer]   
   
Resp: Decreased breath sounds, no wheezing, no creps. No accessory muscle use CV:  audible LVAD motor GI:  Soft, mild distended, non tender. BS +, no hepatosplenomegaly Musculoskeletal:  mild-mod edema, warm, wounds on bilat LE Neurologic:  Moves all extremities. AAOx3, diffuse mild tremors in upper and lower extremities. Data Review:  
 Review and/or order of clinical lab test 
XR CHEST PORT 
  
INDICATION: Possible aspiration 
  
COMPARISON: 4/26/2021 at 1406 hours 
  
TECHNIQUE: Portable AP upright chest view at 0950 hours 
  
FINDINGS: The support devices are stable. There is stable cardiac silhouette 
enlargement. The pulmonary vasculature is within normal limits.  
  
The lungs and pleural spaces are clear. There is no pneumothorax. The bones and 
upper abdomen are stable. 
  
IMPRESSION 
  
Clear lungs. Stable cardiac silhouette enlargement. 
  
 
Labs:  
 
Recent Labs 05/03/21 
4350 05/02/21 
0017 WBC 3.4* 3.2* HGB 8.7* 8.5* HCT 30.2* 29.2*  
 145* Recent Labs 05/03/21 
9581 05/02/21 
0017 05/01/21 
7075  137 138  
K 4.2 4.4 4.4  104 105 CO2 26 29 29 BUN 39* 33* 22* CREA 3.51* 3.16* 2.52* GLU 92 109* 100 CA 9.0 9.0 9.0 MG  --   --  2.3 PHOS 4.2 3.3 2.7 Recent Labs 05/03/21 
0573 05/02/21 
0017 05/01/21 
4495 ALT 9* 10* 10* AP 53 48 53 TBILI 0.6 0.7 0.7 TP 8.0 7.8 8.1 ALB 3.4* 3.4* 3.4*  
GLOB 4.6* 4.4* 4.7* Recent Labs 05/03/21 
9915 05/02/21 
0017 05/01/21 
3306 INR 3.9* 3.0* 2.5* PTP 38.7* 29.5* 24.9* No results for input(s): FE, TIBC, PSAT, FERR in the last 72 hours. Lab Results Component Value Date/Time Folate 10.0 10/28/2020 03:56 AM  
  
No results for input(s): PH, PCO2, PO2 in the last 72 hours. No results for input(s): CPK, CKNDX, TROIQ in the last 72 hours. No lab exists for component: CPKMB Lab Results Component Value Date/Time Cholesterol, total 129 04/16/2021 10:40 AM  
 HDL Cholesterol 33 04/16/2021 10:40 AM  
 LDL, calculated 74 04/16/2021 10:40 AM  
 Triglyceride 110 04/16/2021 10:40 AM  
 CHOL/HDL Ratio 3.9 04/16/2021 10:40 AM  
 
Lab Results Component Value Date/Time Glucose (POC) 90 05/03/2021 06:16 AM  
 Glucose (POC) 99 05/02/2021 09:32 PM  
 Glucose (POC) 87 05/02/2021 04:39 PM  
 Glucose (POC) 124 (H) 05/02/2021 11:33 AM  
 Glucose (POC) 85 05/02/2021 06:35 AM  
 
Lab Results Component Value Date/Time Color YELLOW/STRAW 04/18/2021 05:08 PM  
 Appearance CLEAR 04/18/2021 05:08 PM  
 Specific gravity 1.016 04/18/2021 05:08 PM  
 Specific gravity 1.013 11/09/2020 12:05 AM  
 pH (UA) 6.5 04/18/2021 05:08 PM  
 Protein 30 (A) 04/18/2021 05:08 PM  
 Glucose Negative 04/18/2021 05:08 PM  
 Ketone Negative 04/18/2021 05:08 PM  
 Bilirubin Negative 04/18/2021 05:08 PM  
 Urobilinogen 0.2 04/18/2021 05:08 PM  
 Nitrites Negative 04/18/2021 05:08 PM  
 Leukocyte Esterase SMALL (A) 04/18/2021 05:08 PM  
 Epithelial cells FEW 04/18/2021 05:08 PM  
 Bacteria Negative 04/18/2021 05:08 PM  
 WBC 0-4 04/18/2021 05:08 PM  
 RBC 0-5 04/18/2021 05:08 PM  
 
 
 
Medications Reviewed:  
 
Current Facility-Administered Medications Medication Dose Route Frequency  bumetanide (BUMEX) tablet 2 mg  2 mg Oral TID  mineral oil (FLEET) enema   Rectal PRN  
 bisacodyL (DULCOLAX) suppository 10 mg 10 mg Rectal DAILY PRN  
 benzocaine-zinc cl-benzalkonium cl (ORAJEL) 20-0.1-0.02 % mucosal gel   Oral PRN  
 lactulose (CHRONULAC) 10 gram/15 mL solution 30 mL  20 g Oral DAILY  heparin (porcine) 1,000 unit/mL injection 1,100 Units  1,100 Units InterCATHeter DIALYSIS PRN And  
 heparin (porcine) 1,000 unit/mL injection 1,400 Units  1,400 Units InterCATHeter DIALYSIS PRN  
 glucose chewable tablet 16 g  4 Tab Oral PRN  
 dextrose (D50W) injection syrg 12.5-25 g  12.5-25 g IntraVENous PRN  
 glucagon (GLUCAGEN) injection 1 mg  1 mg IntraMUSCular PRN  
 0.9% sodium chloride infusion 250 mL  250 mL IntraVENous PRN  
 budesonide-formoteroL (SYMBICORT) 160-4.5 mcg/actuation HFA inhaler 1 Puff (Patient Supplied)  1 Puff Inhalation BID RT  
 potassium chloride SR (KLOR-CON 10) tablet 20 mEq  20 mEq Oral DAILY  albuterol (PROVENTIL HFA, VENTOLIN HFA, PROAIR HFA) inhaler 2 Puff (Patient Supplied)  2 Puff Inhalation Q4H PRN  
 epoetin amalia-epbx (RETACRIT) injection 20,000 Units  20,000 Units SubCUTAneous Q MON, WED & FRI  
 oxymetazoline (AFRIN) 0.05 % nasal spray 2 Spray  2 Spray Both Nostrils BID PRN  
 sodium chloride (OCEAN) 0.65 % nasal squeeze bottle 2 Spray  2 Spray Both Nostrils Q2H PRN  therapeutic multivitamin (THERAGRAN) tablet 1 Tab  1 Tab Oral DAILY  isosorbide mononitrate ER (IMDUR) tablet 30 mg  30 mg Oral DAILY  cephALEXin (KEFLEX) capsule 250 mg  250 mg Oral BID  sodium chloride (NS) flush 5-40 mL  5-40 mL IntraVENous Q8H  
 sodium chloride (NS) flush 5-40 mL  5-40 mL IntraVENous PRN  
 acetaminophen (TYLENOL) tablet 650 mg  650 mg Oral Q4H PRN  
 hydrALAZINE (APRESOLINE) 20 mg/mL injection 10 mg  10 mg IntraVENous Q4H PRN  
 hydrALAZINE (APRESOLINE) tablet 100 mg  100 mg Oral TID  hydrOXYzine HCL (ATARAX) tablet 10 mg  10 mg Oral TID PRN  
 insulin glargine (LANTUS) injection 20 Units  20 Units SubCUTAneous BID  PARoxetine (PAXIL) tablet 20 mg  20 mg Oral DAILY  ranolazine ER (RANEXA) tablet 1,000 mg  1,000 mg Oral BID  tafamidis cap 61 mg (Patient Supplied)  61 mg Oral DAILY  warfarin dosing per pharmacy   Other Rx Dosing/Monitoring  miconazole (MICOTIN) 2 % powder   Topical BID  
 
______________________________________________________________________ EXPECTED LENGTH OF STAY: 5d 7h 
ACTUAL LENGTH OF STAY:          Lazara Flores MD

## 2021-05-03 NOTE — PROGRESS NOTES
Occupational Therapy: defer    Chart reviewed. Patient is receiving HD. Will defer OT at this time and will f/u as able and appropriate.     Nesha De Luna, OTR/L

## 2021-05-03 NOTE — PROGRESS NOTES
Transitions of Care Plan:  RUR:  41% - High  Clinical Plan: HD today (MWF schedule); permacath tomorrow AM  Consults: Nephrology; [facility]; Therapy  Baseline: ambulates with rollator; LVAD; resides alone; open w [address]  Disposition: [facility] vs [facility] - referrals out with multiple agencies    Disposition Plan:    Primary Plan:  [facility] if can accept LVAD, HD, and Trilogy - referrals with [facility] and [facility]  · CM updated [facility] that patient has her own Trilogy - under review  · CM left HIPPA compliant VM with [facility] to confirm referral received    HH is backup if patient cannot go to [facility] given LVAD, HD, and Trilogy:  · [address] - referral sent    HD Unit - outpatient HD clinic set up:  · [facility] - will need LVAD training if able to accept  · [facility] - unable to accept due to staffing (LVAD RN not available until end of May)  · [address] - pending review    CM will follow up with Nicole Vaughan today for outpatient LVAD clinic set up in the [place] area.     Lev Salazar, MPH  Care Manager [facility]  Available via [employer] or  [employer]

## 2021-05-03 NOTE — PROGRESS NOTES
Problem: Mobility Impaired (Adult and Pediatric)  Goal: *Acute Goals and Plan of Care (Insert Text)  Description: FUNCTIONAL STATUS PRIOR TO ADMISSION: Patient was modified independent using a rollator for functional mobility. HOME SUPPORT PRIOR TO ADMISSION: The patient lived alone with daughter to provide assistance. Will be staying with daughter once discharged. Physical Therapy Goals  Reviewed 5/3/2021   1. Patient will move from supine to sit and sit to supine , scoot up and down, and roll side to side in bed with minimal assistance/contact guard assist within 7 day(s). 2.  Patient will transfer from bed to chair and chair to bed with minimal assistance/contact guard assist using the least restrictive device within 7 day(s). 3.  Patient will perform sit to stand with minimal assistance/contact guard assist within 7 day(s). 4.  Patient will ambulate with supervision for 50 feet with the least restrictive device within 7 days. 5.  Patient will stand at bedside with rollator for 5 minutes within 7 days. Revised 4/26/2021  1. Patient will move from supine to sit and sit to supine , scoot up and down, and roll side to side in bed with minimal assistance/contact guard assist within 7 day(s). 2.  Patient will transfer from bed to chair and chair to bed with minimal assistance/contact guard assist using the least restrictive device within 7 day(s). 3.  Patient will perform sit to stand with minimal assistance/contact guard assist within 7 day(s). 4.  Patient will sit at EOB with supervision for balance engaged in task for 5 minutes within 7 day(s). Physical Therapy Goals  Initiated 4/17/2021  1. Patient will move from supine to sit and sit to supine  and scoot up and down in bed with modified independence within 7 day(s). 2.  Patient will transfer from bed to chair and chair to bed with modified independence using the least restrictive device within 7 day(s).   3.  Patient will perform sit to stand with modified independence within 7 day(s). 4.  Patient will ambulate with modified independence for 150 feet with the least restrictive device within 7 day(s). Outcome: Progressing Towards Goal    PHYSICAL THERAPY TREATMENT: WEEKLY REASSESSMENT  Patient: Patrica Mcneil (51 y.o. female)  Date: 5/3/2021  Primary Diagnosis: Dyspnea [R06.00]  Procedure(s) (LRB):  RIGHT HEART CATH (N/A) 13 Days Post-Op   Precautions:   (LVAD)      ASSESSMENT  Patient continues with skilled PT services and is progressing towards goals. Patient c/o fatigue following dialysis but agreeable to intervention. She required moderate assist and additional time to transfer to EOB. Initially c/o dizziness and H/A but subsided with time. Able to progress total gait distance when given a seated rest break between trials. Utilized her rollator from home requiring CGA for 40' total (4 total 5' forward/backward trials) with 1 standing rest break and a seated rest break after 20'. Making daily overall progress but remains below her modified independent baseline. Patient is highly motivated and could tolerate 3 hours of IP rehab daily. Patient's progression toward goals since last assessment: daily progression of gait to a session total of 40'; improving independence with sit to stand transfers    Current Level of Function Impacting Discharge (mobility/balance): moderate assist for supine to sit             PLAN :  Goals have been updated based on progression since last assessment. Patient continues to benefit from skilled intervention to address the above impairments. Recommendations and Planned Interventions: bed mobility training, transfer training, gait training, therapeutic exercises and therapeutic activities      Frequency/Duration: Patient will be followed by physical therapy:  5 times a week to address goals.     Recommendation for discharge: (in order for the patient to meet his/her long term goals)  Therapy 3 hours per day 5-7 days per week    This discharge recommendation:  Has not yet been discussed the attending provider and/or case management    IF patient discharges home will need the following DME: to be determined (TBD)         SUBJECTIVE:   Patient stated We can try.     OBJECTIVE DATA SUMMARY:   HISTORY:    Past Medical History:   Diagnosis Date    Asthma     Cancer ([facility]     breast    [facility]     endometrial    Congestive heart failure, unspecified     CRI (chronic renal insufficiency)     Depression     Diabetes (HCC)     Hypertension      Past Surgical History:   Procedure Laterality Date    HX HERNIA REPAIR      HX HYSTERECTOMY      HX MASTECTOMY      IR INSERT NON TUNL CVC OVER 5 YRS  4/26/2021       Personal factors and/or comorbidities impacting plan of care:     Home Situation  Home Environment: Private residence  # Steps to Enter: 0  One/Two Story Residence: One story  Living Alone: Yes  Support Systems: Child(ivania), Home care staff  Patient Expects to be Discharged to[de-identified] Private residence  Current DME Used/Available at Home: [employer], rollator  Tub or Shower Type: (sponge bathing)    EXAMINATION/PRESENTATION/DECISION MAKING:   Critical Behavior:  Neurologic State: Alert, Appropriate for age  Orientation Level: Oriented X4  Cognition: Appropriate decision making, Appropriate for age attention/concentration, Appropriate safety awareness, Follows commands, Recognition of people/places  Safety/Judgement: Decreased insight into deficits, Decreased awareness of need for safety  Hearing: Auditory  Auditory Impairment: None  Vision:      Functional Mobility:  Bed Mobility:     Supine to Sit: Moderate assistance; Additional time        Transfers:  Sit to Stand: Minimum assistance; Additional time  Stand to Sit: Minimum assistance(poorly controlled descent)                       Balance:   Sitting: Impaired  Sitting - Static: Good (unsupported)  Sitting - Dynamic: Fair (occasional)  Standing: Impaired  Standing - Static: Fair  Standing - Dynamic : Fair  Ambulation/Gait Training:  Distance (ft): 40 Feet (ft)((40 total) 4 sets of 2x5')  Assistive Device: Gait belt;Walker, rollator  Ambulation - Level of Assistance: Contact guard assistance        Gait Abnormalities: Decreased step clearance        Base of Support: Widened     Speed/Ivana: Slow;Shuffled  Step Length: Left shortened;Right shortened                      Pain Rating:      Activity Tolerance:   Fair    After treatment patient left in no apparent distress:   Supine in bed and Call bell within reach    COMMUNICATION/EDUCATION:   The patients plan of care was discussed with: Registered nurse. Fall prevention education was provided and the patient/caregiver indicated understanding., Patient/family have participated as able in goal setting and plan of care. and Patient/family agree to work toward stated goals and plan of care.     Thank you for this referral.  Josi Tijerina, PT, DPT   Time Calculation: 32 mins

## 2021-05-03 NOTE — PROGRESS NOTES
[facility] in [place], [place]  Inpatient Consult Progress Note      Patient name: Harrie Hamman  Patient : 1950  Patient MRN: [id number]  Consulting MD: Norma Barrett MD  Date of service: 21    CHIEF COMPLAINT:  Dyspnea     PLAN:  · Continue HD for volume management; per Nephrology- will need to determine longevity of HD therapy and plan for discharge   · Plan for Permacath placement tomorrow; hold warfarin tonight   · ID consult for pancytopenia and persistent sed rate appreciated; ?alternative abx agent - recommend against stopping antibiotics altogether due to chronic SWI/suspected pump infection and high risk of clinical deterioration if infection is not suppressed  ?  Palliative Care Consult to discuss goals and limits of care; patient and daughter currently wish to continue full measures including HD      Continue decreased device speed 9400rpm due to increased ectopy  Continue hydralazine 100 mg po TID and imdur 30 mg PO daily  Continue tafamidis 61 mg PO daily  Intolerant to BB/ACEi/ARB/ARNI due to aTTR  Intolerant to MRA due to hyperkalemia  Volume management per Nephrology; adelina for strict I/O, standing weights  Appreciate SLP consult and recommendations  Coumadin dose per pharmacy; goal INR lowered to 1.8-2.5 due to GIB  Hold warfarin tonight for Permacath placement tomorrow   Continue ranolazine, no ectopy - followed by Dr. Hay Leung OP  Antibiotics for chronic sternal wound infection per ID - continue Keflex po  Repeat iron/ferritin remain low, s/p Venofer 200mg daily  Ammonia improved today; d/c lactulose and trend  TSH 3.99; Synthroid on hold due to tremors- repeat before discharge    Use home Trilogy when napping and QHS  PFTs when euvolemic   Continue Miralax daily   PT/OT  Palliative care consult appreciated; patient remains full code  DM management per Hospitalist   Up to date on flu, PNA, and COVID vaccinations      IMPRESSION:  R leg cellulitis  BLE edema  Acute on chronic RV failure  Coronary artery disease  · [facility] (8/2016) high grade ramus and small PDA disease, borderline disease of LAD and takeoff of pRCA  Chronic systolic heart failure  · Stage C, NYHA class IV improved to IIIA symptoms with LVAD  · Combined ischemic and non-ischemic cardiomyopathy, 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 Portillo Huddleston)  · C/b delayed extubation due to severe COPD  · C/b critical illness polyneuropathy  · C/b prolonged hospitalization post-LVAD, 55 days  · C/b sternal wound infection s/p debridement (by Dr. Carmen Wisdom) s/p wound vac  · C/b sacral ulcer  · Would culture positive for Staph aureus, not MRSA  · C/b LVAD site drainage, improved  S/p CRT-ICD  · ICD fired due to electrolyte imbalance (2011)  H/o breast cancer (1992)   · s/p bilateral mastectomy/chemo and endometrial cancer s/p hysterectomy  · Lymphedema of LLE due to cancer treatment  Severe COPD with FEV1 50%  Depression  Atrial fibrillation  H/o \"two mini strokes\"  S/p fall with hip facture   · Right hip hemmiarthroplasty (5/23/18) by Dr. Thomas Vargas)  · S/p removed hip hardwarare due to pain (4/15/19)  COPD severe  CKD, stage 4  Hyperkalemia, resolved  Pulmonary hypertension  Cardiac risk factors:  · Morbid obesity, Body mass index is 42.29 kg/m².   · DM2 insulin dependent  · JED on Trilogy  · HL  Urinary incontinence, severe  · no procedures due to anticoagulation  Endometrial cancer (2002)  HTN  HL     CARDIAC IMAGING:  TTE 4/19 shows improved LVIDd, 6.68 cm with RVIDd 5.14 cm and TAPSE 1.1 cm   TTE 4/28/21 LVIDd 7.37cm, RVIDd 5.27cm, TAPSE 1.44cm   Echo (9/24/19) LVEF 20-25%, AV opens 1:1, no AR  Echo (5/29/18) LVEF 10-%, ramps study done, report in [facility]  Echo (1/9/18) ramp study done, LVEDD 7.1cm  [facility] (11/9/18) 2 vessel disease with 90% OM, 80% PDA, DSA to PDA branch  [facility] 4/20 showed adequate Sal CI 3.0 but severely elevated RA pressure 24 mmHg  CXR negative 4/27/21     INTERVAL HISTORY:  Feels well today  HD in progress   Tremors resolved  INR 3.9  Improved volume status     LVAD INTERROGATION:  Device interrogated in person  Device function normal, normal flow, no events  LVAD   Pump Speed (RPM): 9400  Pump Flow (LPM): 5.1  MAP: 80  PI (Pulsitility Index): 5.7  Power: 5.4   Test: No  Back Up  at Bedside & Labeled: Yes  Power Module Test: Yes  Driveline Site Care: No  Driveline Dressing: Clean, Dry, and Intact  Outpatient: No  MAP in Therapeutic Range (Outpatient): Yes  Testing  Alarms Reviewed: Yes  Back up SC speed: 9400  Back up Low Speed Limit: 9000  Emergency Equipment with Patient?: Yes  Emergency procedures reviewed?: No  Drive line site inspected?: (site covered by dressing; HD in progress will change later)  Drive line intergrity inspected?: Yes  Drive line dressing changed?: No    PHYSICAL EXAM:  Visit Vitals  BP (!) 80/0   Pulse 73   Temp 98.2 °F (36.8 °C) (Oral)   Resp 16   Ht 5' 7\" (1.702 m)   Wt 289 lb 0.4 oz (131.1 kg)   SpO2 96%   BMI 45.27 kg/m²     General: Patient is well developed, well-nourished in no acute distress  HEENT: Normocephalic and atraumatic. No scleral icterus. Pupils are equal, round and reactive to light and accomodation. No conjunctival injection. Oropharynx is clear. Neck: Supple. No evidence of thyroid enlargements or lymphadenopathy. JVD: Cannot be appreciated   Lungs: Breath sounds are equal and clear bilaterally. No wheezes, rhonchi, or rales. Heart: Regular rate and rhythm with normal S1 and S2. No murmurs, gallops or rubs. Abdomen: Soft, no mass or tenderness. No organomegaly or hernia. Bowel sounds present. Genitourinary and rectal: deferred  Extremities: No cyanosis, clubbing, or edema. Neurologic: No focal sensory or motor deficits are noted. Grossly intact.   Psychiatric: Awake, alert an doriented x 3. Appropriate mood and affect. Skin: Warm, dry and well perfused. No lesions, nodules or rashes are noted. REVIEW OF SYSTEMS:  General: Denies fever, night sweats. Ear, nose and throat: Denies difficulty hearing, sinus problems, runny nose, post-nasal drip, ringing in ears, mouth sores, loose teeth, ear pain, nosebleeds, sore throate, facial pain or numbess  Cardiovascular: see above in the interval history  Respiratory: Denies cough, wheezing, sputum production, hemoptysis. Gastrointestinal: Denies heartburn, constipation, intolerance to certain foods, diarrhea, abdominal pain, nausea, vomiting, difficulty swallowing, blood in stool  Kidney and bladder: Denies painful urination, frequent urination. Musculoskeletal: Denies joint pain, muscle weakness  Skin and hair: Denies change in existing skin lesions, hair loss or increase, breast changes    PAST MEDICAL HISTORY:  Past Medical History:   Diagnosis Date    Asthma     Cancer ([facility] [address] [facility]     breast    Cancer ([facility]     endometrial    Congestive heart failure, unspecified     CRI (chronic renal insufficiency)     Depression     [facility]     Hypertension        PAST SURGICAL HISTORY:  Past Surgical History:   Procedure Laterality Date    HX HERNIA REPAIR      HX HYSTERECTOMY      HX MASTECTOMY      IR INSERT NON TUNL CVC OVER 5 YRS  4/26/2021       FAMILY HISTORY:  No family history on file. SOCIAL HISTORY:  Social History     Socioeconomic History    Marital status:      Spouse name: Not on file    Number of children: Not on file    Years of education: Not on file    Highest education level: Not on file   Tobacco Use    Smoking status: Never Smoker    Smokeless tobacco: Never Used   Substance and Sexual Activity    Alcohol use: Not Currently       LABORATORY RESULTS:     Labs Latest Ref Rng & Units 5/3/2021 5/2/2021 5/1/2021 4/30/2021 4/29/2021 4/29/2021 4/28/2021   WBC 3.6 - 11.0 K/uL 3.4(L) 3. 2(L) 3.5(L) 3. 2(L) - 3.6 3.0(L)   RBC 3.80 - 5.20 M/uL 3.37(L) 3.24(L) 3.32(L) 3.24(L) - 3.37(L) 3.15(L)   Hemoglobin 11.5 - 16.0 g/dL 8.7(L) 8.5(L) 8.7(L) 8.4(L) - 8. 9(L) 8. 3(L)   Hematocrit 35.0 - 47.0 % 30. 2(L) 29. 2(L) 30. 1(L) 29. 5(L) - 30. 4(L) 28. 5(L)   MCV 80.0 - 99.0 FL 89.6 90.1 90.7 91.0 - 90.2 90.5   Platelets 201 - 356 K/uL 160 145(L) 163 155 - 155 137(L)   Lymphocytes 12 - 49 % 13 13 11(L) 17 - 11(L) 14   Monocytes 5 - 13 % 10 10 9 13 - 9 9   Eosinophils 0 - 7 % 0 0 0 0 - 0 0   Basophils 0 - 1 % 0 1 1 1 - 1 1   Bands 0 - 6 % - - 8(H) - - - -   Albumin 3.5 - 5.0 g/dL 3.4(L) 3.4(L) 3.4(L) 3. 3(L) - 3. 4(L) 3. 3(L)   Calcium 8.5 - 10.1 MG/DL 9.0 9.0 9.0 8.9 - 8.4(L) 8.7   Glucose 65 - 100 mg/dL 92 109(H) 100 84 - 141(H) 79   BUN 6 - 20 MG/DL 39(H) 33(H) 22(H) 31(H) - 20 33(H)   Creatinine 0.55 - 1.02 MG/DL 3.51(H) 3.16(H) 2.52(H) 2.88(H) - 2.04(H) 2.25(H)   Sodium 136 - 145 mmol/L 136 137 138 139 - 137 138   Potassium 3.5 - 5.1 mmol/L 4.2 4.4 4.4 4.2 4.3 4.0 3.8   TSH 0.36 - 3.74 uIU/mL - - - - - - -   LDH 81 - 246 U/L 189 194 178 176 - 228 188   Some recent data might be hidden     Lab Results   Component Value Date/Time    TSH 5.36 (H) 04/24/2021 04:32 AM    TSH 3.99 (H) 04/17/2021 04:54 AM    TSH 2.980 10/01/2020 12:00 AM       ALLERGY:  Allergies   Allergen Reactions    Benzodiazepines Other (comments)     Respiratory issues        CURRENT MEDICATIONS:    Current Facility-Administered Medications:     bumetanide (BUMEX) tablet 2 mg, 2 mg, Oral, TID, Roselyn Jones MD, Stopped at 05/03/21 1100    mineral oil (FLEET) enema, , Rectal, PRN, Festus Ribeiro MD    bisacodyL (DULCOLAX) suppository 10 mg, 10 mg, Rectal, DAILY PRN, Triny Limon NP, 10 mg at 04/28/21 1950    benzocaine-zinc cl-benzalkonium cl (ORAJEL) 20-0.1-0.02 % mucosal gel, , Oral, PRN, Bobby Santiago MD, Given at 04/27/21 2211    lactulose (CHRONULAC) 10 gram/15 mL solution 30 mL, 20 g, Oral, DAILY, Ezekiel Walter NP, 30 mL at 04/29/21 0913    heparin (porcine) 1,000 unit/mL injection 1,100 Units, 1,100 Units, InterCATHeter, DIALYSIS PRN, 1,100 Units at 05/03/21 1004 **AND** heparin (porcine) 1,000 unit/mL injection 1,400 Units, 1,400 Units, InterCATHeter, DIALYSIS PRN, Felice Castillo MD, 1,400 Units at 05/03/21 1004    glucose chewable tablet 16 g, 4 Tab, Oral, PRN, Chris Howard MD    dextrose (D50W) injection syrg 12.5-25 g, 12.5-25 g, IntraVENous, PRN, Chris Howard MD, 25 g at 04/26/21 2246    glucagon (GLUCAGEN) injection 1 mg, 1 mg, IntraMUSCular, PRN, Chris Howard MD    0.9% sodium chloride infusion 250 mL, 250 mL, IntraVENous, PRN, Edison Chicas NP    budesonide-formoteroL (SYMBICORT) 160-4.5 mcg/actuation HFA inhaler 1 Puff (Patient Supplied), 1 Puff, Inhalation, BID RT, Chris Howard MD, 1 Puff at 05/02/21 2048    potassium chloride SR (KLOR-CON 10) tablet 20 mEq, 20 mEq, Oral, DAILY, Alyssa Walter T, NP, 20 mEq at 05/03/21 0903    albuterol (PROVENTIL HFA, VENTOLIN HFA, PROAIR HFA) inhaler 2 Puff (Patient Supplied), 2 Puff, Inhalation, Q4H PRN, Chris Howard MD    epoetin amalia-epbx (RETACRIT) injection 20,000 Units, 20,000 Units, SubCUTAneous, Q MON, WED & FRI, Jaspreet Mendoza MD, 20,000 Units at 04/30/21 2243    oxymetazoline (AFRIN) 0.05 % nasal spray 2 Spray, 2 Spray, Both Nostrils, BID PRN, Meseret Walter NP, 2 [place] at 04/22/21 1800    sodium chloride (OCEAN) 0.65 % nasal squeeze bottle 2 Spray, 2 Spray, Both Nostrils, Q2H PRN, Meseret Walter NP    therapeutic multivitamin (THERAGRAN) tablet 1 Tab, 1 Tab, Oral, DAILY, Meseret Walter NP, 1 Tab at 05/03/21 0903    isosorbide mononitrate ER (IMDUR) tablet 30 mg, 30 mg, Oral, DAILY, Meseret Walter NP, Stopped at 05/03/21 0900    cephALEXin (KEFLEX) capsule 250 mg, 250 mg, Oral, BID, Lazara Leavitt MD, 250 mg at 05/03/21 3507    sodium chloride (NS) flush 5-40 mL, 5-40 mL, IntraVENous, Q8H, Meseret Walter NP, 10 mL at 05/03/21 0600    sodium chloride (NS) flush 5-40 mL, 5-40 mL, IntraVENous, PRN, Rosi Walter, TEA, 10 mL at 04/22/21 1238    acetaminophen (TYLENOL) tablet 650 mg, 650 mg, Oral, Q4H PRN, Eric Walter NP, 650 mg at 05/02/21 2327    hydrALAZINE (APRESOLINE) 20 mg/mL injection 10 mg, 10 mg, IntraVENous, Q4H PRN, Rosi Walter, NP, 10 mg at 04/21/21 9599    hydrALAZINE (APRESOLINE) tablet 100 mg, 100 mg, Oral, TID, Eric Walter NP, Stopped at 05/03/21 0900    hydrOXYzine HCL (ATARAX) tablet 10 mg, 10 mg, Oral, TID PRN, Eric Walter NP    insulin glargine (LANTUS) injection 20 Units, 20 Units, SubCUTAneous, BID, Rosi Walter NP, 20 Units at 05/03/21 0900    PARoxetine (PAXIL) tablet 20 mg, 20 mg, Oral, DAILY, Rosi Walter NP, 20 mg at 05/03/21 0903    ranolazine ER (RANEXA) tablet 1,000 mg, 1,000 mg, Oral, BID, Rosi Walter NP, 1,000 mg at 05/03/21 1098    tafamidis cap 61 mg (Patient Supplied), 61 mg, Oral, DAILY, Eric Walter NP, 61 mg at 05/03/21 1516    warfarin dosing per pharmacy, , Other, Rx Dosing/Monitoring, Eric Walter NP    miconazole (MICOTIN) 2 % powder, , Topical, BID, Madeleine Claros MD, Given at 05/02/21 1709    PATIENT CARE TEAM:  Patient Care Team:  Venus Barth NP as PCP - General (Nurse Practitioner)  Lydia Tse MD (Cardiology)  Lennox Larger, MD as Physician (Cardiology)     Thank you for allowing me to participate in this patient's care. Jimbo Zepeda NP  [address] [place] [address], [address]  Phone: [phone]  Fax: [phone]    [facility] ATTENDING ADDENDUM    Patient was seen and examined in person. Data and notes were reviewed. I have discussed and agree with the plan as noted in the NP note above without further additions.     Tej Figueroa MD PhD  [facility]

## 2021-05-03 NOTE — PROCEDURES
[facility]  [phone]    Vitals   Pre   Post   Assessment   Pre   Post     Temp  Temp: 98.2 °F (36.8 °C) (05/03/21 0950)  98.2 LOC  A/O A/O   HR   Pulse (Heart Rate): 72 (05/03/21 0950) 73 Lungs   Unlabored Unlabored   B/P   BP: (MAP 78) (05/03/21 0950) MAP 86 Cardiac   LVAD LVAD   Resp   Resp Rate: 16 (05/03/21 0950) 16 Skin   Fragile Fragile   Pain level  Pain Intensity 1: 0 (05/03/21 0849) 0 Edema  Generalized     Generalized   Orders:    Duration:   Start:    1405 End:    1250 Total:   3:00   Dialyzer:   Dialyzer/Set Up Inspection: Marceil Graft (05/03/21 0950)   K Bath:   Dialysate K (mEq/L): 3 (05/03/21 0950)   Ca Bath:   Dialysate CA (mEq/L): 2.5 (05/03/21 0950)   Na/Bicarb:   Dialysate NA (mEq/L): 138 (05/03/21 0950)   Target Fluid Removal:   Goal/Amount of Fluid to Remove (mL): 2000 mL (05/03/21 0950)   Access     Type & Location:   R CVC: Dressing CDI. No s/s of infection. Both lumens aspirate & flush well. Running well at . Each catheter limb disinfected per p&p, caps removed, hubs disinfected per p&p.      Labs     Obtained/Reviewed   Critical Results Called   Date when labs were drawn-  Hgb-    HGB   Date Value Ref Range Status   05/03/2021 8.7 (L) 11.5 - 16.0 g/dL Final     K-    Potassium   Date Value Ref Range Status   05/03/2021 4.2 3.5 - 5.1 mmol/L Final     Ca-   Calcium   Date Value Ref Range Status   05/03/2021 9.0 8.5 - 10.1 MG/DL Final     Bun-   BUN   Date Value Ref Range Status   05/03/2021 39 (H) 6 - 20 MG/DL Final     Creat-   Creatinine   Date Value Ref Range Status   05/03/2021 3.51 (H) 0.55 - 1.02 MG/DL Final        Medications/ Blood Products Given     Name   Dose   Route and Time     Heparin 1mL/1000u  Post HD to dwell in each CVC limb             Blood Volume Processed (BVP):    64.8 Net Fluid   Removed:  2000mL   Comments   Time Out Done:   Primary Nurse SBAR Rpt Pre: Henny Ballesteros RN  Primary Nurse SBAR Rpt Post: Henny Ballesteros RN  Pt Education: Procedural  Care Plan: MD/HD plan of care  Tx Summary:  1133: Treatment initiated as ordered  1250: Tx ended. VSS. Each dialysis catheter limb disinfected per p&p, all possible blood returned per p&p, and each dialysis hub disinfected per p&p. Each lumen flushed, post dialysis catheter Heparin dwell instilled per order, and caps applied. Bed locked and in the lowest position, call bell and belongings in reach. SBAR given to Primary, RN. Patient is stable at time of my departure. Admiting Diagnosis: Dyspnea  Consent signed - Informed Consent Verified: Yes (05/03/21 0950)  Hepatitis B Status- 4/21/21 Jing Ag negative - 4/27/21 Jing Ab susceptible  Machine #- Machine Number: [id number] (05/03/21 5481)  Telemetry status- Bedside  Pre-dialysis wt. - Pre-Dialysis Weight: 115.4 kg (254 lb 6.6 oz) (04/30/21 0870)

## 2021-05-03 NOTE — PROGRESS NOTES
Pharmacist Note - Warfarin Dosing  Consult provided for this 78 y. o.female to manage warfarin for LVAD    INR Goal: 1.8-2.5 per NP (lowered due to GIB)    Home regimen/ tablet size: 4 mg daily     Drugs that may increase INR: None  Drugs that may decrease INR: None  Other current anticoagulants/ drugs that may increase bleeding risk: None  Risk factors: Age > 65  Daily INR ordered: YES    Recent Labs     05/03/21  0243 05/02/21  0017 05/01/21  0314   HGB 8.7* 8.5* 8.7*   INR 3.9* 3.0* 2.5*     Date               INR                  Dose  4/16  1.6  4 mg    4/17  1.8  4 mg   4/18  1.8  4 mg   4/19  1.8  4 mg   4/20                 2.0                  4 mg  4/21                 2.1                  4 mg  4/22                 2.2                  4 mg  4/23                 2.6                  2.5 mg  4/24  2.7  2.5 mg  4/25  2.5  2.5 mg  4/26                1.8                   4 mg    4/27                1.8                   4 mg   4/28  2.1  3 mg  4/29  2.3  3 mg  4/30                2.3                   3 mg     5/1                  2.5                   2.5 mg     5/2                  3.0                   --   5/3  3.9  hold                                                                                                                                                    Assessment/ Plan: Will HOLD warfarin x1 dose tonight for large change in INR which is now outside goal INR. Pharmacy will continue to monitor daily and adjust therapy as indicated. Please contact the pharmacist at [phone] for outpatient recommendations if needed.

## 2021-05-03 NOTE — PROGRESS NOTES
Labs reviewed. Plt within normal limits. Hgb and WBC stable. Will sign off. Call if questions. Patient discussed with Dr. Bar Martell.    Signed By: Nicolle Mcleod NP     May 3, 2021

## 2021-05-03 NOTE — PROGRESS NOTES
[facility] 
 [address] [facility], [facility] [address] [facility] Phone: [phone]  
  
Nephrology Progress Note 
Patrick Gallagher     1950     [id number] Date of Admission : 4/16/2021 05/03/21 CC: Follow up for ROCIO Assessment and Plan ROICO on CKD: 
- likely 2/2 progressive CKD and CRS 
- dialysis initiated 4/26 and tolerating well 
- change to oral diuretics 
- HD today and MWF this week 
- NPO at [facility] for [facility] tomorrow AM 
- CM working on outpt unit in [facility] Encephalopathy: 
- appears to be 2/2 uremia 
- now resolved after dialysis CKD stage III. - baseline around 2 
- presumed 2/2 DM, HTN. 
- Bone marrow Bx not done. - Renal US appearance of Kidneys not suggestive of Amyloidosis  
  
Chronic systolic CHF, stage C NYHA class IV 
-Combined ischemic and nonischemic cardiomyopathy 
-S/p HM 2 LVAD implant 4/15/2017 by Dr. Mickey Esquivel at [facility] 
- hx of recurrent VT : off mexiletine - chronic RV failure. 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 Chronic anemia :  
+ve PYP testing.  
- anemia of chronic disease  
- continue HEIKE Severe COPD. Pulmonary hypertension. Chronic A. fib. History of endometrial Ca ATTR amyloidosis Interval History: 
Seen and examined. Feeling better. No cp, sob, n/v/d. Uneventful weekend. Poor UOP. Cr up today. For HD later today. Review of Systems: A comprehensive review of systems was negative except for that written in the HPI. Current Medications:  
Current Facility-Administered Medications Medication Dose Route Frequency  bumetanide (BUMEX) tablet 2 mg  2 mg Oral TID  mineral oil (FLEET) enema   Rectal PRN  
 bisacodyL (DULCOLAX) suppository 10 mg  10 mg Rectal DAILY PRN  
 benzocaine-zinc cl-benzalkonium cl (ORAJEL) 20-0.1-0.02 % mucosal gel   Oral PRN  
 lactulose (CHRONULAC) 10 gram/15 mL solution 30 mL  20 g Oral DAILY  heparin (porcine) 1,000 unit/mL injection 1,100 Units  1,100 Units InterCATHeter DIALYSIS PRN And  
 heparin (porcine) 1,000 unit/mL injection 1,400 Units  1,400 Units InterCATHeter DIALYSIS PRN  
 glucose chewable tablet 16 g  4 Tab Oral PRN  
 dextrose (D50W) injection syrg 12.5-25 g  12.5-25 g IntraVENous PRN  
 glucagon (GLUCAGEN) injection 1 mg  1 mg IntraMUSCular PRN  
 0.9% sodium chloride infusion 250 mL  250 mL IntraVENous PRN  
 budesonide-formoteroL (SYMBICORT) 160-4.5 mcg/actuation HFA inhaler 1 Puff (Patient Supplied)  1 Puff Inhalation BID RT  
 potassium chloride SR (KLOR-CON 10) tablet 20 mEq  20 mEq Oral DAILY  albuterol (PROVENTIL HFA, VENTOLIN HFA, PROAIR HFA) inhaler 2 Puff (Patient Supplied)  2 Puff Inhalation Q4H PRN  
 epoetin amalia-epbx (RETACRIT) injection 20,000 Units  20,000 Units SubCUTAneous Q MON, WED & FRI  
 oxymetazoline (AFRIN) 0.05 % nasal spray 2 Spray  2 Spray Both Nostrils BID PRN  
 sodium chloride (OCEAN) 0.65 % nasal squeeze bottle 2 Spray  2 Spray Both Nostrils Q2H PRN  therapeutic multivitamin (THERAGRAN) tablet 1 Tab  1 Tab Oral DAILY  isosorbide mononitrate ER (IMDUR) tablet 30 mg  30 mg Oral DAILY  cephALEXin (KEFLEX) capsule 250 mg  250 mg Oral BID  sodium chloride (NS) flush 5-40 mL  5-40 mL IntraVENous Q8H  
 sodium chloride (NS) flush 5-40 mL  5-40 mL IntraVENous PRN  
 acetaminophen (TYLENOL) tablet 650 mg  650 mg Oral Q4H PRN  
 hydrALAZINE (APRESOLINE) 20 mg/mL injection 10 mg  10 mg IntraVENous Q4H PRN  
 hydrALAZINE (APRESOLINE) tablet 100 mg  100 mg Oral TID  hydrOXYzine HCL (ATARAX) tablet 10 mg  10 mg Oral TID PRN  
 insulin glargine (LANTUS) injection 20 Units  20 Units SubCUTAneous BID  PARoxetine (PAXIL) tablet 20 mg  20 mg Oral DAILY  ranolazine ER (RANEXA) tablet 1,000 mg  1,000 mg Oral BID  tafamidis cap 61 mg (Patient Supplied)  61 mg Oral DAILY  warfarin dosing per pharmacy   Other Rx Dosing/Monitoring  miconazole (MICOTIN) 2 % powder Topical BID Allergies Allergen Reactions  Benzodiazepines Other (comments) Respiratory issues Objective: 
Vitals:  
Vitals:  
 05/02/21 1921 05/02/21 2330 05/03/21 0300 05/03/21 6868 Pulse: 79 85 79 72 Resp: 16 16 18 18 Temp: 98.3 °F (36.8 °C) 98.4 °F (36.9 °C) 98.3 °F (36.8 °C) 98.2 °F (36.8 °C) TempSrc:      
SpO2: 95% 95% 93% 95% Weight:      
Height:      
 
Intake and Output: 
05/03 0701 - 05/03 1900 In: -  
Out: 400 [Urine:400] 05/01 1901 - 05/03 0700 In: 580 [P.O.:580] Out: 775 [Urine:775] Physical Examination: 
General: Morbidly obese, in NAD Lucía Awe Neck: Supple,no mass palpable Lungs : CTA  
CVS: RRR, VAD sounds Abdomen: Soft, NT, BS +, obese Extremities: LE discoloration, LUE lymphedema Neurologic: Awake, alert, minimal tremors now Access: Manual Carol in place  : adelina 
  
 
[]    High complexity decision making was performed 
[]    Patient is at high-risk of decompensation with multiple organ involvement Lab Data Personally Reviewed: I have reviewed all the pertinent labs, microbiology data and radiology studies during assessment. Recent Labs 05/03/21 
4974 05/02/21 
0017 05/01/21 
9090  137 138  
K 4.2 4.4 4.4  104 105 CO2 26 29 29 GLU 92 109* 100 BUN 39* 33* 22* CREA 3.51* 3.16* 2.52* CA 9.0 9.0 9.0 MG  --   --  2.3 PHOS 4.2 3.3 2.7 ALB 3.4* 3.4* 3.4* ALT 9* 10* 10* INR 3.9* 3.0* 2.5* Recent Labs 05/03/21 
0743 05/02/21 
0017 05/01/21 
7369 WBC 3.4* 3.2* 3.5* HGB 8.7* 8.5* 8.7* HCT 30.2* 29.2* 30.1*  
 145* 163 No results found for: SDES Lab Results Component Value Date/Time  Culture result: NO GROWTH 5 DAYS 04/23/2021 06:51 PM  
 Culture result: NO GROWTH 5 DAYS 11/10/2020 06:42 PM  
 Culture result: No Growth (<1000 cfu/mL) 11/09/2020 12:05 AM  
 Culture result: SCANT Morganella morganii ssp morganii (A) 10/30/2020 11:30 AM  
 Culture result: SCANT Morganella morganii ssp morganii (A) 10/30/2020 11:30 AM  
 Culture result: RARE DIPHTHEROIDS (A) 10/30/2020 11:30 AM  
 
Recent Results (from the past 24 hour(s)) GLUCOSE, POC Collection Time: 05/02/21 11:33 AM  
Result Value Ref Range Glucose (POC) 124 (H) 65 - 100 mg/dL Performed by Danny Rubio GLUCOSE, POC Collection Time: 05/02/21  4:39 PM  
Result Value Ref Range Glucose (POC) 87 65 - 100 mg/dL Performed by [facility] GLUCOSE, POC Collection Time: 05/02/21  9:32 PM  
Result Value Ref Range Glucose (POC) 99 65 - 100 mg/dL Performed by Barb Merino   
PROTHROMBIN TIME + INR Collection Time: 05/03/21  2:43 AM  
Result Value Ref Range INR 3.9 (H) 0.9 - 1.1 Prothrombin time 38.7 (H) 9.0 - 11.1 sec NT-PRO BNP Collection Time: 05/03/21  2:43 AM  
Result Value Ref Range NT pro-BNP 10,986 (H) <125 PG/ML  
LD Collection Time: 05/03/21  2:43 AM  
Result Value Ref Range  81 - 246 U/L  
CBC WITH AUTOMATED DIFF Collection Time: 05/03/21  2:43 AM  
Result Value Ref Range WBC 3.4 (L) 3.6 - 11.0 K/uL  
 RBC 3.37 (L) 3.80 - 5.20 M/uL HGB 8.7 (L) 11.5 - 16.0 g/dL HCT 30.2 (L) 35.0 - 47.0 % MCV 89.6 80.0 - 99.0 FL  
 MCH 25.8 (L) 26.0 - 34.0 PG  
 MCHC 28.8 (L) 30.0 - 36.5 g/dL RDW 20.2 (H) 11.5 - 14.5 % PLATELET 508 318 - 614 K/uL MPV 8.6 (L) 8.9 - 12.9 FL  
 NRBC 0.0 0  WBC ABSOLUTE NRBC 0.00 0.00 - 0.01 K/uL NEUTROPHILS 76 (H) 32 - 75 % LYMPHOCYTES 13 12 - 49 % MONOCYTES 10 5 - 13 % EOSINOPHILS 0 0 - 7 % BASOPHILS 0 0 - 1 % IMMATURE GRANULOCYTES 1 (H) 0.0 - 0.5 % ABS. NEUTROPHILS 2.7 1.8 - 8.0 K/UL  
 ABS. LYMPHOCYTES 0.4 (L) 0.8 - 3.5 K/UL  
 ABS. MONOCYTES 0.3 0.0 - 1.0 K/UL  
 ABS. EOSINOPHILS 0.0 0.0 - 0.4 K/UL  
 ABS. BASOPHILS 0.0 0.0 - 0.1 K/UL  
 ABS. IMM. GRANS. 0.0 0.00 - 0.04 K/UL  
 DF SMEAR SCANNED    
 RBC COMMENTS ANISOCYTOSIS 2+ 
    
 RBC COMMENTS OVALOCYTES PRESENT 
    
PHOSPHORUS  Collection Time: 05/03/21  2:43 AM  
Result Value Ref Range Phosphorus 4.2 2.6 - 4.7 MG/DL  
AMMONIA Collection Time: 05/03/21  2:43 AM  
Result Value Ref Range Ammonia 22 <26 UMOL/L  
METABOLIC PANEL, COMPREHENSIVE Collection Time: 05/03/21  2:43 AM  
Result Value Ref Range Sodium 136 136 - 145 mmol/L Potassium 4.2 3.5 - 5.1 mmol/L Chloride 103 97 - 108 mmol/L  
 CO2 26 21 - 32 mmol/L Anion gap 7 5 - 15 mmol/L Glucose 92 65 - 100 mg/dL BUN 39 (H) 6 - 20 MG/DL Creatinine 3.51 (H) 0.55 - 1.02 MG/DL  
 BUN/Creatinine ratio 11 (L) 12 - 20 GFR est AA 16 (L) >60 ml/min/1.73m2 GFR est non-AA 13 (L) >60 ml/min/1.73m2 Calcium 9.0 8.5 - 10.1 MG/DL Bilirubin, total 0.6 0.2 - 1.0 MG/DL  
 ALT (SGPT) 9 (L) 12 - 78 U/L  
 AST (SGOT) 20 15 - 37 U/L Alk. phosphatase 53 45 - 117 U/L Protein, total 8.0 6.4 - 8.2 g/dL Albumin 3.4 (L) 3.5 - 5.0 g/dL Globulin 4.6 (H) 2.0 - 4.0 g/dL A-G Ratio 0.7 (L) 1.1 - 2.2 GLUCOSE, POC Collection Time: 05/03/21  6:16 AM  
Result Value Ref Range Glucose (POC) 90 65 - 100 mg/dL Performed by Joaquina Delgado I have reviewed the flowsheets. Chart and Pertinent Notes have been reviewed. No change in PMH ,family and social history from Consult note.  
 
 
Pio Gotti MD

## 2021-05-04 NOTE — PROGRESS NOTES
Pharmacist Note - Warfarin Dosing  Consult provided for this 78 y. o.female to manage warfarin for LVAD    INR Goal: 1.8-2.5 per NP (lowered due to GIB)    Home regimen/ tablet size: 4 mg daily     Drugs that may increase INR: None  Drugs that may decrease INR: None  Other current anticoagulants/ drugs that may increase bleeding risk: None  Risk factors: Age > 65  Daily INR ordered: YES    Recent Labs     05/04/21  0119 05/03/21  0243 05/02/21  0017   HGB 9.3* 8.7* 8.5*   INR 3.6* 3.9* 3.0*     Date               INR                  Dose  4/16  1.6  4 mg    4/17  1.8  4 mg   4/18  1.8  4 mg   4/19  1.8  4 mg   4/20                 2.0                  4 mg  4/21                 2.1                  4 mg  4/22                 2.2                  4 mg  4/23                 2.6                  2.5 mg  4/24  2.7  2.5 mg  4/25  2.5  2.5 mg  4/26                1.8                   4 mg    4/27                1.8                   4 mg   4/28  2.1  3 mg  4/29  2.3  3 mg  4/30                2.3                   3 mg     5/1                  2.5                   2.5 mg     5/2                  3.0                   --   5/3  3.9  Hold  5/4  3.6  Hold                                                                                                                                                      Assessment/ Plan: Will HOLD warfarin x1 dose tonight for large change in INR which is now outside goal INR. Pharmacy will continue to monitor daily and adjust therapy as indicated. Please contact the pharmacist at [phone] for outpatient recommendations if needed.

## 2021-05-04 NOTE — PROGRESS NOTES
1530:  Bedside shift change report given to Opal Pimentel RN (oncoming nurse) by Charlotte Mathews RN (offgoing nurse). Report included the following information SBAR, Kardex, Procedure Summary, Intake/Output, MAR and Recent Results. 1900:  Driveline dressing changed per protocol. 1930:  Bedside shift change report given to Yann Pelaez RN (oncoming nurse) by Opal Pimentel RN (offgoing nurse). Report included the following information SBAR, Kardex, Procedure Summary, Intake/Output, MAR and Recent Results.

## 2021-05-04 NOTE — PROGRESS NOTES
[facility] 
 [address] [address], [facility] [address] [facility] Phone: [phone]  
  
Nephrology Progress Note 
Lang Andrews     1950     [id number] Date of Admission : 4/16/2021 05/04/21 CC: Follow up for ROCIO Assessment and Plan ROCIO on CKD: 
- likely 2/2 progressive CKD and CRS 
- dialysis initiated 4/26 and tolerating well 
- HD MWF for now - PC on hold due to elevated INR 
- NPO at [facility] for [facility] tomorrow AM, coumadin on hold - CM working on outpt unit in [facility] Encephalopathy: 
- appears to be 2/2 uremia 
- now resolved after dialysis CKD stage III. - baseline around 2 
- presumed 2/2 DM, HTN. 
- Bone marrow Bx not done. - Renal US appearance of Kidneys not suggestive of Amyloidosis  
  
Chronic systolic CHF, stage C NYHA class IV 
-Combined ischemic and nonischemic cardiomyopathy 
-S/p HM 2 LVAD implant 4/15/2017 by Dr. Angela Vaughan at [facility] 
- hx of recurrent VT : off mexiletine - chronic RV failure. 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 Chronic anemia :  
+ve PYP testing.  
- anemia of chronic disease  
- continue HEIKE Severe COPD. Pulmonary hypertension. Chronic A. fib. History of endometrial Ca ATTR amyloidosis Interval History: 
Seen and examined. Feeling better. No cp, sob, n/v/d. Tolerated HD yesterday, 2 L UF. UOP of about 800cc. Review of Systems: A comprehensive review of systems was negative except for that written in the HPI. Current Medications:  
Current Facility-Administered Medications Medication Dose Route Frequency  bumetanide (BUMEX) tablet 2 mg  2 mg Oral TID  mineral oil (FLEET) enema   Rectal PRN  
 bisacodyL (DULCOLAX) suppository 10 mg  10 mg Rectal DAILY PRN  
 benzocaine-zinc cl-benzalkonium cl (ORAJEL) 20-0.1-0.02 % mucosal gel   Oral PRN  
 heparin (porcine) 1,000 unit/mL injection 1,100 Units  1,100 Units InterCATHeter DIALYSIS PRN  And  
 heparin (porcine) 1,000 unit/mL injection 1,400 Units  1,400 Units InterCATHeter DIALYSIS PRN  
 glucose chewable tablet 16 g  4 Tab Oral PRN  
 dextrose (D50W) injection syrg 12.5-25 g  12.5-25 g IntraVENous PRN  
 glucagon (GLUCAGEN) injection 1 mg  1 mg IntraMUSCular PRN  
 0.9% sodium chloride infusion 250 mL  250 mL IntraVENous PRN  
 budesonide-formoteroL (SYMBICORT) 160-4.5 mcg/actuation HFA inhaler 1 Puff (Patient Supplied)  1 Puff Inhalation BID RT  
 potassium chloride SR (KLOR-CON 10) tablet 20 mEq  20 mEq Oral DAILY  albuterol (PROVENTIL HFA, VENTOLIN HFA, PROAIR HFA) inhaler 2 Puff (Patient Supplied)  2 Puff Inhalation Q4H PRN  
 epoetin amalia-epbx (RETACRIT) injection 20,000 Units  20,000 Units SubCUTAneous Q MON, WED & FRI  
 oxymetazoline (AFRIN) 0.05 % nasal spray 2 Spray  2 Spray Both Nostrils BID PRN  
 sodium chloride (OCEAN) 0.65 % nasal squeeze bottle 2 Spray  2 Spray Both Nostrils Q2H PRN  therapeutic multivitamin (THERAGRAN) tablet 1 Tab  1 Tab Oral DAILY  isosorbide mononitrate ER (IMDUR) tablet 30 mg  30 mg Oral DAILY  cephALEXin (KEFLEX) capsule 250 mg  250 mg Oral BID  sodium chloride (NS) flush 5-40 mL  5-40 mL IntraVENous Q8H  
 sodium chloride (NS) flush 5-40 mL  5-40 mL IntraVENous PRN  
 acetaminophen (TYLENOL) tablet 650 mg  650 mg Oral Q4H PRN  
 hydrALAZINE (APRESOLINE) 20 mg/mL injection 10 mg  10 mg IntraVENous Q4H PRN  
 hydrALAZINE (APRESOLINE) tablet 100 mg  100 mg Oral TID  hydrOXYzine HCL (ATARAX) tablet 10 mg  10 mg Oral TID PRN  
 insulin glargine (LANTUS) injection 20 Units  20 Units SubCUTAneous BID  PARoxetine (PAXIL) tablet 20 mg  20 mg Oral DAILY  ranolazine ER (RANEXA) tablet 1,000 mg  1,000 mg Oral BID  tafamidis cap 61 mg (Patient Supplied)  61 mg Oral DAILY  warfarin dosing per pharmacy   Other Rx Dosing/Monitoring  miconazole (MICOTIN) 2 % powder   Topical BID Allergies Allergen Reactions  Benzodiazepines Other (comments) Respiratory issues Objective: 
Vitals:  
Vitals:  
 05/03/21 1956 05/04/21 0053 05/04/21 0430 05/04/21 5167 Pulse:  78 91 Resp:  18 16 Temp:  98.2 °F (36.8 °C) 97.5 °F (36.4 °C) TempSrc:      
SpO2: 93% 92% 95% Weight:      
Height:    5' 7\" (1.702 m) Intake and Output: 
No intake/output data recorded. 05/02 1901 - 05/04 0700 In: 900 [P.O.:900] Out: 2900 [Urine:900] Physical Examination: 
General: Morbidly obese, in NAD Jayson Brea Neck: Supple,no mass palpable Lungs : CTA  
CVS: RRR, VAD sounds Abdomen: Soft, NT, BS +, obese Extremities: LE discoloration, LUE lymphedema Neurologic: Awake, alert, minimal tremors now Access: Jennifer Majors in place  : adelina 
  
 
[]    High complexity decision making was performed 
[]    Patient is at high-risk of decompensation with multiple organ involvement Lab Data Personally Reviewed: I have reviewed all the pertinent labs, microbiology data and radiology studies during assessment. Recent Labs 05/04/21 
3990 05/03/21 
6644 05/02/21 
0017  136 137  
K 4.1 4.2 4.4  
 103 104 CO2 28 26 29 GLU 83 92 109* BUN 23* 39* 33* CREA 2.50* 3.51* 3.16* CA 9.0 9.0 9.0 PHOS 3.4 4.2 3.3 ALB 3.2* 3.4* 3.4* ALT 10* 9* 10* INR 3.6* 3.9* 3.0* Recent Labs 05/04/21 
8835 05/03/21 
7303 05/02/21 
0017 WBC 3.0* 3.4* 3.2* HGB 9.3* 8.7* 8.5* HCT 31.5* 30.2* 29.2*  
 160 145* No results found for: SDES Lab Results Component Value Date/Time  Culture result: NO GROWTH 5 DAYS 04/23/2021 06:51 PM  
 Culture result: NO GROWTH 5 DAYS 11/10/2020 06:42 PM  
 Culture result: No Growth (<1000 cfu/mL) 11/09/2020 12:05 AM  
 Culture result: SCANT Morganella morganii ssp morganii (A) 10/30/2020 11:30 AM  
 Culture result: SCANT Morganella morganii ssp morganii (A) 10/30/2020 11:30 AM  
 Culture result: RARE DIPHTHEROIDS (A) 10/30/2020 11:30 AM  
 
Recent Results (from the past 24 hour(s)) GLUCOSE, POC Collection Time: 05/03/21 11:18 AM  
Result Value Ref Range Glucose (POC) 99 65 - 100 mg/dL Performed by Jesus Barroso, POC Collection Time: 05/03/21  4:53 PM  
Result Value Ref Range Glucose (POC) 100 65 - 100 mg/dL Performed by Kenyatta Arvizu PROTHROMBIN TIME + INR Collection Time: 05/04/21  1:19 AM  
Result Value Ref Range INR 3.6 (H) 0.9 - 1.1 Prothrombin time 35.6 (H) 9.0 - 11.1 sec NT-PRO BNP Collection Time: 05/04/21  1:19 AM  
Result Value Ref Range NT pro-BNP 10,087 (H) <125 PG/ML  
LD Collection Time: 05/04/21  1:19 AM  
Result Value Ref Range  81 - 246 U/L  
CBC WITH AUTOMATED DIFF Collection Time: 05/04/21  1:19 AM  
Result Value Ref Range WBC 3.0 (L) 3.6 - 11.0 K/uL  
 RBC 3.49 (L) 3.80 - 5.20 M/uL HGB 9.3 (L) 11.5 - 16.0 g/dL HCT 31.5 (L) 35.0 - 47.0 % MCV 90.3 80.0 - 99.0 FL  
 MCH 26.6 26.0 - 34.0 PG  
 MCHC 29.5 (L) 30.0 - 36.5 g/dL RDW 20.5 (H) 11.5 - 14.5 % PLATELET 034 432 - 789 K/uL MPV 8.9 8.9 - 12.9 FL  
 NRBC 0.0 0  WBC ABSOLUTE NRBC 0.00 0.00 - 0.01 K/uL NEUTROPHILS 62 32 - 75 % LYMPHOCYTES 24 12 - 49 % MONOCYTES 13 5 - 13 % EOSINOPHILS 0 0 - 7 % BASOPHILS 0 0 - 1 % IMMATURE GRANULOCYTES 1 (H) 0.0 - 0.5 % ABS. NEUTROPHILS 1.9 1.8 - 8.0 K/UL  
 ABS. LYMPHOCYTES 0.7 (L) 0.8 - 3.5 K/UL  
 ABS. MONOCYTES 0.4 0.0 - 1.0 K/UL  
 ABS. EOSINOPHILS 0.0 0.0 - 0.4 K/UL  
 ABS. BASOPHILS 0.0 0.0 - 0.1 K/UL  
 ABS. IMM. GRANS. 0.0 0.00 - 0.04 K/UL  
 DF SMEAR SCANNED    
 RBC COMMENTS POLYCHROMASIA 1+ 
    
 RBC COMMENTS ANISOCYTOSIS 
2+ PHOSPHORUS Collection Time: 05/04/21  1:19 AM  
Result Value Ref Range Phosphorus 3.4 2.6 - 4.7 MG/DL  
AMMONIA Collection Time: 05/04/21  1:19 AM  
Result Value Ref Range Ammonia 23 <33 UMOL/L  
METABOLIC PANEL, COMPREHENSIVE Collection Time: 05/04/21  1:19 AM  
Result Value Ref Range  Sodium 137 136 - 145 mmol/L  
 Potassium 4.1 3.5 - 5.1 mmol/L Chloride 104 97 - 108 mmol/L  
 CO2 28 21 - 32 mmol/L Anion gap 5 5 - 15 mmol/L Glucose 83 65 - 100 mg/dL BUN 23 (H) 6 - 20 MG/DL Creatinine 2.50 (H) 0.55 - 1.02 MG/DL  
 BUN/Creatinine ratio 9 (L) 12 - 20 GFR est AA 23 (L) >60 ml/min/1.73m2 GFR est non-AA 19 (L) >60 ml/min/1.73m2 Calcium 9.0 8.5 - 10.1 MG/DL Bilirubin, total 0.6 0.2 - 1.0 MG/DL  
 ALT (SGPT) 10 (L) 12 - 78 U/L  
 AST (SGOT) 23 15 - 37 U/L Alk. phosphatase 56 45 - 117 U/L Protein, total 7.8 6.4 - 8.2 g/dL Albumin 3.2 (L) 3.5 - 5.0 g/dL Globulin 4.6 (H) 2.0 - 4.0 g/dL A-G Ratio 0.7 (L) 1.1 - 2.2 I have reviewed the flowsheets. Chart and Pertinent Notes have been reviewed. No change in PMH ,family and social history from Consult note.  
 
 
Sam Liang MD

## 2021-05-04 NOTE — PROGRESS NOTES
[facility] VAD/Heart Failure Progress Note    Admit Date: 2021  POD:  14 Days Post-Op    Procedure:  Procedure(s):  RIGHT HEART CATH        Subjective:   Dyspnea, tremors, and LE edema improving; good flows      Objective:   Vitals:  Blood pressure (!) 80/0, pulse 94, temperature 97.7 °F (36.5 °C), resp. rate 22, height 5' 7\" (1.702 m), weight 247 lb (112 kg), SpO2 94 %. Temp (24hrs), Av.8 °F (36.6 °C), Min:97.5 °F (36.4 °C), Max:98.3 °F (36.8 °C)    Hemodynamics:   CO:     CI:     CVP:     SVR:     PAP Systolic:     PAP Diastolic:     PVR:     EN36:     SCV02:      VAD Interrogation: LVAD (Heartmate)  Pump Speed (RPM): 9400  Pump Flow (LPM): 5.5  Chatter in Lines: No  PI (Pulsitility Index): 5  Power: 6  MAP: 82   Test: Yes  Back Up  at Bedside & Labeled: Yes  Power Module Test: Yes  Driveline Site Care: No  Driveline Dressing: Clean, Dry, and Intact    EKG/Rhythm:      Extubation Date / Time:     CT Output:     Ventilator:  Ventilator Volumes  Vt Spont (ml): 435 ml (21 025)  Ve Observed (l/min): 6.7 l/min (21 025)    Oxygen Therapy:  Oxygen Therapy  O2 Sat (%): 94 % (21 1508)  Pulse via Oximetry: 83 beats per minute (21 1956)  O2 Device: None (Room air) (21 1508)  Skin Assessment: Clean, dry, & intact (21 0932)  Skin Protection for O2 Device: No (21 025)  O2 Flow Rate (L/min): 2 l/min (21 0300)  FIO2 (%): 21 % (21 0826)    CXR:    Admission Weight: Last Weight   Weight: 270 lb 6.4 oz (122.7 kg) Weight: 247 lb (112 kg)     Intake / Output / Drain:  Current Shift:  0701 -  1900  In: 820 [P.O.:820]  Out: 325 [Urine:325]  Last 24 hrs.:     Intake/Output Summary (Last 24 hours) at 2021 1754  Last data filed at 2021 1508  Gross per 24 hour   Intake 1120 ml   Output 500 ml   Net 620 ml             No results for input(s): CPK, CKMB, TROIQ in the last 72 hours.   Recent Labs 05/04/21  0119 05/03/21  0243 05/02/21  0017    136 137   K 4.1 4.2 4.4   CO2 28 26 29   BUN 23* 39* 33*   CREA 2.50* 3.51* 3.16*   GLU 83 92 109*   PHOS 3.4 4.2 3.3   WBC 3.0* 3.4* 3.2*   HGB 9.3* 8.7* 8.5*   HCT 31.5* 30.2* 29.2*    160 145*     Recent Labs     05/04/21  0119 05/03/21  0243 05/02/21  0017   INR 3.6* 3.9* 3.0*   PTP 35.6* 38.7* 29.5*     No lab exists for component: PBNP        Current Facility-Administered Medications:     insulin lispro (HUMALOG) injection, , SubCUTAneous, AC&HS, Festus Ribeiro MD, Stopped at 05/04/21 1630    dextrose (D50W) injection syrg 12.5-25 g, 12.5-25 g, IntraVENous, PRN, Festus Ribeiro MD    hydrocortisone (CORTAID) 1 % cream, , Topical, BID, Festus Ribeiro MD, Given at 05/04/21 1800    insulin glargine (LANTUS) injection 12 Units, 12 Units, SubCUTAneous, QHS, Festus Ribeiro MD    bumetanide (BUMEX) tablet 2 mg, 2 mg, Oral, TID, Beti Fiore MD, 2 mg at 05/04/21 1658    mineral oil (FLEET) enema, , Rectal, PRN, Festus Ribeiro MD    bisacodyL (DULCOLAX) suppository 10 mg, 10 mg, Rectal, DAILY PRN, Braxton Triny B, NP, 10 mg at 04/28/21 1950    benzocaine-zinc cl-benzalkonium cl (ORAJEL) 20-0.1-0.02 % mucosal gel, , Oral, PRN, Luis Brizuela MD, Given at 05/04/21 1004    heparin (porcine) 1,000 unit/mL injection 1,100 Units, 1,100 Units, InterCATHeter, DIALYSIS PRN, 1,100 Units at 05/03/21 1004 **AND** heparin (porcine) 1,000 unit/mL injection 1,400 Units, 1,400 Units, InterCATHeter, DIALYSIS PRN, Arlin Solorzano MD, 1,400 Units at 05/03/21 1004    glucose chewable tablet 16 g, 4 Tab, Oral, PRN, Luis Brizuela MD    glucagon (GLUCAGEN) injection 1 mg, 1 mg, IntraMUSCular, PRN, Luis Brizuela MD    0.9% sodium chloride infusion 250 mL, 250 mL, IntraVENous, PRN, Kraig Austin NP    budesonide-formoteroL (SYMBICORT) 160-4.5 mcg/actuation HFA inhaler 1 Puff (Patient Supplied), 1 Puff, Inhalation, BID RT, Mary Oro, Lillian Alberto MD, 1 Puff at 05/04/21 0900    potassium chloride SR (KLOR-CON 10) tablet 20 mEq, 20 mEq, Oral, DAILY, Deyvi Walter NP, 20 mEq at 05/04/21 1003    albuterol (PROVENTIL HFA, VENTOLIN HFA, PROAIR HFA) inhaler 2 Puff (Patient Supplied), 2 Puff, Inhalation, Q4H PRN, Corinne Brook, MD    epoetin amalia-epbx (RETACRIT) injection 20,000 Units, 20,000 Units, SubCUTAneous, Q MON, [employer] [place], Jaspreet ABBASI MD, 20,000 Units at 05/03/21 2130    oxymetazoline (AFRIN) 0.05 % nasal spray 2 Spray, 2 Spray, Both Nostrils, BID PRN, Jose Angel, Aidan Gaines NP, 2 [place] at 04/22/21 1800    sodium chloride (OCEAN) 0.65 % nasal squeeze bottle 2 Spray, 2 Spray, Both Nostrils, Q2H PRN, Aidan Walter NP    therapeutic multivitamin (THERAGRAN) tablet 1 Tab, 1 Tab, Oral, DAILY, Deyvi Walter NP, 1 Tab at 05/04/21 1001    isosorbide mononitrate ER (IMDUR) tablet 30 mg, 30 mg, Oral, DAILY, Deyvi Walter NP, 30 mg at 05/04/21 1001    cephALEXin (KEFLEX) capsule 250 mg, 250 mg, Oral, BID, Brown Pina MD, 250 mg at 05/04/21 1700    sodium chloride (NS) flush 5-40 mL, 5-40 mL, IntraVENous, Q8H, Deyvi Walter NP, 10 mL at 05/04/21 1400    sodium chloride (NS) flush 5-40 mL, 5-40 mL, IntraVENous, PRN, Deyvi Walter NP, 10 mL at 04/22/21 1238    acetaminophen (TYLENOL) tablet 650 mg, 650 mg, Oral, Q4H PRN, Dana Walter NP, 650 mg at 05/04/21 1241    hydrALAZINE (APRESOLINE) 20 mg/mL injection 10 mg, 10 mg, IntraVENous, Q4H PRN, Deyvi Walter, NP, 10 mg at 04/21/21 8850    hydrALAZINE (APRESOLINE) tablet 100 mg, 100 mg, Oral, TID, Deyvi Walter, NP, 100 mg at 05/04/21 1659    hydrOXYzine HCL (ATARAX) tablet 10 mg, 10 mg, Oral, TID PRN, Aidan Walter, NP    PARoxetine (PAXIL) tablet 20 mg, 20 mg, Oral, DAILY, Deyvi Walter, NP, 20 mg at 05/04/21 1002    ranolazine ER (RANEXA) tablet 1,000 mg, 1,000 mg, Oral, BID, Deyvi Walter NP, 1,000 mg at 05/04/21 1700   tafamidis cap 61 mg (Patient Supplied), 61 mg, Oral, DAILY, Bessy Walter NP, 61 mg at 05/04/21 1002    warfarin dosing per pharmacy, , Other, Rx Dosing/Monitoring, Severiano Blind, NP    miconazole (MICOTIN) 2 % powder, , Topical, BID, Elena Rojo MD, Given at 05/04/21 1700      A/P     S/P LVAD - good flows  Need for [facility] - coumadin  Fluid overload - diuresis  Acute kidney injury - monitor      Risk of morbidity and mortality - high  Medical decision making - high complexity  Signed By: Soledad Rondon MD

## 2021-05-04 NOTE — PROGRESS NOTES
[facility] in [place], [place]  Inpatient Consult Progress Note      Patient name: Manuel Cartwright  Patient : 1950  Patient MRN: [id number]  Consulting MD: Candance Fox, MD  Date of service: 21    CHIEF COMPLAINT:  Dyspnea     PLAN:  · Continue HD for volume management; per Nephrology- will need to determine longevity of HD therapy and plan for discharge   · Plan for Permacath placement tomorrow; hold warfarin tonight   · ID consult for pancytopenia and persistent sed rate appreciated; ?alternative abx agent - recommend against stopping antibiotics altogether due to chronic SWI/suspected pump infection and high risk of clinical deterioration if infection is not suppressed  ?  Palliative Care Consult to discuss goals and limits of care; patient and daughter currently wish to continue full measures including HD      Continue decreased device speed 9400rpm due to increased ectopy  Continue hydralazine 100 mg po TID and imdur 30 mg PO daily  Continue tafamidis 61 mg PO daily  Intolerant to BB/ACEi/ARB/ARNI due to aTTR  Intolerant to MRA due to hyperkalemia  Volume management per Nephrology; sahu for strict I/O, standing weights  Appreciate SLP consult and recommendations  Coumadin dose per pharmacy; goal INR lowered to 1.8-2.5 due to GIB  Hold warfarin tonight for Permacath placement tomorrow   Continue ranolazine, no ectopy - followed by Dr. Tejinder Giles OP  Antibiotics for chronic sternal wound infection per ID - continue Keflex po  Repeat iron/ferritin remain low, s/p Venofer 200mg daily  TSH 3.99; Synthroid on hold due to tremors- repeat before discharge    Use home Trilogy when napping and QHS  PFTs when euvolemic   Continue Miralax daily   PT/OT  Palliative care consult appreciated; patient remains full code  DM management per Hospitalist   Up to date on flu, PNA, and COVID vaccinations   Plan for tentative d/c to [facility] on Thursday and transfer to daughter, Melanie Jay house to receive HD at [facility] facility as an OP     IMPRESSION:  R leg cellulitis  BLE edema  Acute on chronic RV failure  Coronary artery disease  · [facility] (8/2016) high grade ramus and small PDA disease, borderline disease of LAD and takeoff of pRCA  Chronic systolic heart failure  · Stage C, NYHA class IV improved to IIIA symptoms with LVAD  · Combined ischemic and non-ischemic cardiomyopathy, 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 Rere Barcenas)  · C/b delayed extubation due to severe COPD  · C/b critical illness polyneuropathy  · C/b prolonged hospitalization post-LVAD, 55 days  · C/b sternal wound infection s/p debridement (by Dr. Selin Lopez) s/p wound vac  · C/b sacral ulcer  · Would culture positive for Staph aureus, not MRSA  · C/b LVAD site drainage, improved  S/p CRT-ICD  · ICD fired due to electrolyte imbalance (2011)  H/o breast cancer (1992)   · s/p bilateral mastectomy/chemo and endometrial cancer s/p hysterectomy  · Lymphedema of LLE due to cancer treatment  Severe COPD with FEV1 50%  Depression  Atrial fibrillation  H/o \"two mini strokes\"  S/p fall with hip facture   · Right hip hemmiarthroplasty (5/23/18) by Dr. Alvina Bowles)  · S/p removed hip hardwarare due to pain (4/15/19)  COPD severe  CKD, stage 4  Hyperkalemia, resolved  Pulmonary hypertension  Cardiac risk factors:  · Morbid obesity, Body mass index is 42.29 kg/m².   · DM2 insulin dependent  · JED on Trilogy  · HL  Urinary incontinence, severe  · no procedures due to anticoagulation  Endometrial cancer (2002)  HTN  HL     CARDIAC IMAGING:  TTE 4/19 shows improved LVIDd, 6.68 cm with RVIDd 5.14 cm and TAPSE 1.1 cm   TTE 4/28/21 LVIDd 7.37cm, RVIDd 5.27cm, TAPSE 1.44cm   Echo (9/24/19) LVEF 20-25%, AV opens 1:1, no AR  Echo (5/29/18) LVEF 10-%, ramps study done, report in epic  Echo (1/9/18) ramp study done, LVEDD 7.1cm  [facility] (11/9/18) 2 vessel disease with 90% OM, 80% PDA, DSA to PDA branch  RHC 4/20 showed adequate Sal CI 3.0 but severely elevated RA pressure 24 mmHg  CXR negative 4/27/21     INTERVAL HISTORY:  Feels well today, working with PT/OT   INR 3.8, permacath placement deferred   725 mL UOP   Pro-BNP 10,087 from 10,986   Ammonia 23     LVAD INTERROGATION:  Device interrogated in person  Device function normal, normal flow, no events  LVAD   Pump Speed (RPM): 9400  Pump Flow (LPM): 5.5  MAP: 76  PI (Pulsitility Index): 4.9  Power: 6   Test: Yes  Back Up  at Bedside & Labeled: Yes  Power Module Test: Yes  Driveline Site Care: No  Driveline Dressing: Clean, Dry, and Intact  Outpatient: No  MAP in Therapeutic Range (Outpatient): Yes  Testing  Alarms Reviewed: Yes  Back up SC speed: 9400  Back up Low Speed Limit: 9000  Emergency Equipment with Patient?: Yes  Emergency procedures reviewed?: No  Drive line site inspected?: (site covered by dressing; HD in progress will change later)  Drive line intergrity inspected?: Yes  Drive line dressing changed?: No    PHYSICAL EXAM:  Visit Vitals  BP (!) 80/0   Pulse 85   Temp 97.7 °F (36.5 °C)   Resp 18   Ht 5' 7\" (1.702 m)   Wt 247 lb (112 kg)   SpO2 95%   BMI 38.69 kg/m²     General: Patient is well developed, well-nourished in no acute distress  HEENT: Normocephalic and atraumatic. No scleral icterus. Pupils are equal, round and reactive to light and accomodation. No conjunctival injection. Oropharynx is clear. Neck: Supple. No evidence of thyroid enlargements or lymphadenopathy. JVD: Cannot be appreciated   Lungs: Breath sounds are equal and clear bilaterally. No wheezes, rhonchi, or rales. Heart: Regular rate and rhythm with normal S1 and S2. No murmurs, gallops or rubs. Abdomen: Soft, no mass or tenderness. No organomegaly or hernia. Bowel sounds present.   Genitourinary and rectal: deferred  Extremities: No cyanosis, clubbing, or edema. Neurologic: No focal sensory or motor deficits are noted. Grossly intact. Psychiatric: Awake, alert an doriented x 3. Appropriate mood and affect. Skin: Warm, dry and well perfused. No lesions, nodules or rashes are noted. REVIEW OF SYSTEMS:  General: Denies fever, night sweats. Ear, nose and throat: Denies difficulty hearing, sinus problems, runny nose, post-nasal drip, ringing in ears, mouth sores, loose teeth, ear pain, nosebleeds, sore throate, facial pain or numbess  Cardiovascular: see above in the interval history  Respiratory: Denies cough, wheezing, sputum production, hemoptysis. Gastrointestinal: Denies heartburn, constipation, intolerance to certain foods, diarrhea, abdominal pain, nausea, vomiting, difficulty swallowing, blood in stool  Kidney and bladder: Denies painful urination, frequent urination. Musculoskeletal: Denies joint pain, muscle weakness  Skin and hair: Denies change in existing skin lesions, hair loss or increase, breast changes    PAST MEDICAL HISTORY:  Past Medical History:   Diagnosis Date    Asthma     Cancer ([facility]     breast    Cancer ([facility]     endometrial    Congestive heart failure, unspecified     CRI (chronic renal insufficiency)     Depression     Diabetes ([facility]     Hypertension        PAST SURGICAL HISTORY:  Past Surgical History:   Procedure Laterality Date    HX HERNIA REPAIR      HX HYSTERECTOMY      HX MASTECTOMY      IR INSERT NON TUNL CVC OVER 5 YRS  4/26/2021       FAMILY HISTORY:  No family history on file.     SOCIAL HISTORY:  Social History     Socioeconomic History    Marital status:      Spouse name: Not on file    Number of children: Not on file    Years of education: Not on file    Highest education level: Not on file   Tobacco Use    Smoking status: Never Smoker    Smokeless tobacco: Never Used   Substance and Sexual Activity    Alcohol use: Not Currently       LABORATORY RESULTS:     Labs Latest Ref Rng & Units 5/4/2021 5/3/2021 5/2/2021 5/1/2021 4/30/2021 4/29/2021 4/29/2021   WBC 3.6 - 11.0 K/uL 3. 0(L) 3.4(L) 3. 2(L) 3.5(L) 3. 2(L) - 3.6   RBC 3.80 - 5.20 M/uL 3.49(L) 3.37(L) 3.24(L) 3.32(L) 3.24(L) - 3.37(L)   Hemoglobin 11.5 - 16.0 g/dL 9.3(L) 8.7(L) 8.5(L) 8.7(L) 8.4(L) - 8. 9(L)   Hematocrit 35.0 - 47.0 % 31. 5(L) 30. 2(L) 29. 2(L) 30. 1(L) 29. 5(L) - 30. 4(L)   MCV 80.0 - 99.0 FL 90.3 89.6 90.1 90.7 91.0 - 90.2   Platelets 144 - 014 K/uL 169 160 145(L) 163 155 - 155   Lymphocytes 12 - 49 % 24 13 13 11(L) 17 - 11(L)   Monocytes 5 - 13 % 13 10 10 9 13 - 9   Eosinophils 0 - 7 % 0 0 0 0 0 - 0   Basophils 0 - 1 % 0 0 1 1 1 - 1   Bands 0 - 6 % - - - 8(H) - - -   Albumin 3.5 - 5.0 g/dL 3.2(L) 3.4(L) 3.4(L) 3.4(L) 3. 3(L) - 3. 4(L)   Calcium 8.5 - 10.1 MG/DL 9.0 9.0 9.0 9.0 8.9 - 8.4(L)   Glucose 65 - 100 mg/dL 83 92 109(H) 100 84 - 141(H)   BUN 6 - 20 MG/DL 23(H) 39(H) 33(H) 22(H) 31(H) - 20   Creatinine 0.55 - 1.02 MG/DL 2.50(H) 3.51(H) 3.16(H) 2.52(H) 2.88(H) - 2.04(H)   Sodium 136 - 145 mmol/L 137 136 137 138 139 - 137   Potassium 3.5 - 5.1 mmol/L 4.1 4.2 4.4 4.4 4.2 4.3 4.0   TSH 0.36 - 3.74 uIU/mL - - - - - - -   LDH 81 - 246 U/L 204 189 194 178 176 - 228   Some recent data might be hidden     Lab Results   Component Value Date/Time    TSH 5.36 (H) 04/24/2021 04:32 AM    TSH 3.99 (H) 04/17/2021 04:54 AM    TSH 2.980 10/01/2020 12:00 AM       ALLERGY:  Allergies   Allergen Reactions    Benzodiazepines Other (comments)     Respiratory issues        CURRENT MEDICATIONS:    Current Facility-Administered Medications:     insulin lispro (HUMALOG) injection, , SubCUTAneous, AC&HS, Festus Ribeiro MD, 2 Units at 05/04/21 1241    dextrose (D50W) injection syrg 12.5-25 g, 12.5-25 g, IntraVENous, PRN, Festus Ribeiro MD    hydrocortisone (CORTAID) 1 % cream, , Topical, BID, Festus Ribeiro MD, Given at 05/04/21 1006    insulin glargine (LANTUS) injection 12 Units, 12 Units, SubCUTAneous, QHS, Festus Ribeiro MD Scheryl Gemma bumetanide (BUMEX) tablet 2 mg, 2 mg, Oral, TID, Umberto Villar MD, 2 mg at 05/04/21 1001    mineral oil (FLEET) enema, , Rectal, PRN, Festus Ribeiro MD    bisacodyL (DULCOLAX) suppository 10 mg, 10 mg, Rectal, DAILY PRN, BraxtonTriny, NP, 10 mg at 04/28/21 1950    benzocaine-zinc cl-benzalkonium cl (ORAJEL) 20-0.1-0.02 % mucosal gel, , Oral, PRN, Marquis Carlos MD, Given at 05/04/21 1004    heparin (porcine) 1,000 unit/mL injection 1,100 Units, 1,100 Units, InterCATHeter, DIALYSIS PRN, 1,100 Units at 05/03/21 1004 **AND** heparin (porcine) 1,000 unit/mL injection 1,400 Units, 1,400 Units, InterCATHeter, DIALYSIS PRN, Phyllis Del Rio MD, 1,400 Units at 05/03/21 1004    glucose chewable tablet 16 g, 4 Tab, Oral, PRN, Marquis Carlos MD    glucagon (GLUCAGEN) injection 1 mg, 1 mg, IntraMUSCular, PRN, Marquis Carlos MD    0.9% sodium chloride infusion 250 mL, 250 mL, IntraVENous, PRN, Eloise Webb NP    budesonide-formoteroL (SYMBICORT) 160-4.5 mcg/actuation HFA inhaler 1 Puff (Patient Supplied), 1 Puff, Inhalation, BID RT, Marquis Carlos MD, 1 Puff at 05/04/21 0900    potassium chloride SR (KLOR-CON 10) tablet 20 mEq, 20 mEq, Oral, DAILY, PollKaterine torres NP, 20 mEq at 05/04/21 1003    albuterol (PROVENTIL HFA, VENTOLIN HFA, PROAIR HFA) inhaler 2 Puff (Patient Supplied), 2 Puff, Inhalation, Q4H PRN, Marquis Carlos MD    epoetin amalia-epbx (RETACRIT) injection 20,000 Units, 20,000 Units, SubCUTAneous, Q MON, WED & FRI, Jaspreet Mendoza MD, 20,000 Units at 05/03/21 2130    oxymetazoline (AFRIN) 0.05 % nasal spray 2 Spray, 2 Spray, Both Nostrils, BID PRN, Favian Walter NP, 2 [place] at 04/22/21 1800    sodium chloride (OCEAN) 0.65 % nasal squeeze bottle 2 Spray, 2 Spray, Both Nostrils, Q2H PRN, Favian Walter NP    therapeutic multivitamin (THERAGRAN) tablet 1 Tab, 1 Tab, Oral, DAILY, Favian Walter NP, 1 Tab at 05/04/21 1001    isosorbide mononitrate ER (IMDUR) tablet 30 mg, 30 mg, Oral, DAILY, Gina Walter NP, 30 mg at 05/04/21 1001    cephALEXin (KEFLEX) capsule 250 mg, 250 mg, Oral, BID, Toribio Goodpasture, MD, 250 mg at 05/04/21 1002    sodium chloride (NS) flush 5-40 mL, 5-40 mL, IntraVENous, Q8H, Gina Walter NP, 10 mL at 05/04/21 0518    sodium chloride (NS) flush 5-40 mL, 5-40 mL, IntraVENous, PRN, Gina Walter, NP, 10 mL at 04/22/21 1238    acetaminophen (TYLENOL) tablet 650 mg, 650 mg, Oral, Q4H PRN, Lucina Walter, NP, 650 mg at 05/04/21 1241    hydrALAZINE (APRESOLINE) 20 mg/mL injection 10 mg, 10 mg, IntraVENous, Q4H PRN, Gina Walter NP, 10 mg at 04/21/21 8037    hydrALAZINE (APRESOLINE) tablet 100 mg, 100 mg, Oral, TID, Gina Walter NP, 100 mg at 05/04/21 1002    hydrOXYzine HCL (ATARAX) tablet 10 mg, 10 mg, Oral, TID PRN, Lucina Walter, NP    PARoxetine (PAXIL) tablet 20 mg, 20 mg, Oral, DAILY, Gina Walter NP, 20 mg at 05/04/21 1002    ranolazine ER (RANEXA) tablet 1,000 mg, 1,000 mg, Oral, BID, Gina Walter NP, 1,000 mg at 05/04/21 1009    tafamidis cap 61 mg (Patient Supplied), 61 mg, Oral, DAILY, Lucina Walter NP, 61 mg at 05/04/21 1002    warfarin dosing per pharmacy, , Other, Rx Dosing/Monitoring, Lucina Walter NP    miconazole (MICOTIN) 2 % powder, , Topical, BID, Toribio Goodpasture, MD, Given at 05/04/21 1005    PATIENT CARE TEAM:  Patient Care Team:  Wil Contreras NP as PCP - General (Nurse Practitioner)  Eduarda Bob MD (Cardiology)  Teresa Vasquez MD as Physician (Cardiology)     Thank you for allowing me to participate in this patient's care. Samra Soto NP  [address] [place] [address], [address]  Phone: [phone]  Fax: [phone]    [facility] ATTENDING ADDENDUM    Patient was seen and examined in person. Data and notes were reviewed.  I have discussed and agree with the plan as noted in the NP note above without further additions.     Dianne Cesar MD PhD  Chrissie Lee

## 2021-05-04 NOTE — PROGRESS NOTES
Transitions of Care Plan:  RUR:  41% - high  Clinical Plan: permacath today; HD at [facility] tomorrow; goal dc for Thursday  Consults: Nephrology; Therapy; Hem/Onc; AHFC - LVAD  Baseline: ambulates with rollator; LVAD; resides alone; open with [facility]  Disposition: IPR vs [facility] - referrals out with multiple agencies    HD outpatient clinic update:  [facility] (pt 1st choice given location to daughter's home):  · CM received call from  at [facility] location Bernardo Mclaughlin p: [phone]). Facility will be able to accept patient for outpatient HD: will need additional LVAD training. Outpatient facility remains certified for high acuity patients. Lee Coppola advised CM they should be able to arrange LVAD training through [facility] (not [facility]) - will call CM back by end of the day to confirm. · CM provided our goal is to discharge patient to IPR and then outpatient HD afterwards (would give [facility] 10-14 days for training). Goal discharge date pending medical progress is Thursday. · MALAIKA and Carolyn with [facility] will coordinate by end of business day on discharge plan/timeframe. IPR Update:  · MALAIKA spoke with attending MD - requested assistance to answer [facility] inquiry into switching patient to Bipap support at their facility. Patient will use her Trilogy after she is discharged from  IPR. Attending MD advised that it will be ok for patient to use [facility] bipap support instead of Trilogy while she is at their facility. He will update his note today. · MALAIKA spoke with [facility] - will review updated information; provided goal discharge for Thursday provided patient is medically ready for transfer of care.        ADDENDUM - as of 1130AM:    IPR update:  · [facility] - request updated OT notes for evaluation and medical review; CM advised OT of same - will see pt this afternoon  · [facility] - CM received call from clinical liaison; multiple questions that [facility] needs to address (unaware of LVAD and Trilogy) - will call CM back if facility can accept  · [facility] remains first choice for IPR provided continuity of care and known ability to accept LVAD patients with higher acuity. [facility] Update:  · Medical Director has accepted patient as outpatient clinic provider  · Clinic will need [facility] to provide training; Lizbeth Gutierrez is unable to train   · [facility] contact for training: Qasim Jose p: [phone] or [email or web address]; CM will advise HF clinic of update to coordinate schedules for training    CM will continue to follow.     García Jerome, MPH  Care Manager [facility]  Available via [facility] or

## 2021-05-04 NOTE — PROGRESS NOTES
In chart for IR pre procedural review. Pt's current INR is 3.6, MD notified and will defer procedure for today.  Jessy Schlatter, [facility] coordinator notified and states she will notify inpatient team.

## 2021-05-04 NOTE — PROGRESS NOTES
[address] [facility] Adult  Hospitalist Group Hospitalist Progress Note Estefania Kelly MD 
Answering service: [phone] OR [phone] from in house phone NAME:  Judith Mart :  1950 MRN:  [id number] Admission Summary:  
Judith Mart is a 78 y.o. female with a past medical history of heart failure s/p LVAD, COPD, CKD stage 3, pulmonary HTN, Depression, HTN, and HLD who presented to [facility] appointment with complaints of chest tightness and increasing dyspnea. She was instructed to come to [facility] for admission for further treatment. At time of examination patient experiencing dyspnea but states she's feeling 100% better than what she felt yesterday. Interval history / Subjective:  
Patient seen and examined. Feels well, some itching/rash on forearms, daughter present in room. INR 3.6 permCath on hold till improves. Assessment & Plan: #. Acute on Chronic systolic congestive heart failure , s/p LVAD #. Chronic respiratory failure: on trilogy at home. Pt ok to use Bipap while in rehab instead of trilogy 
- NYHA class IV ;  s/p LVAD heartMate 2 , increased speed per HF team  
- Advanced heart failure team following and adjusting meds. AC with warfarin  
- RHC-  to measure heart pressures CKD stage 3 - advanced to ESRD- now on HD. Nephrology following - permCath on hold as INR supratherapeutic. COPD-  Stopped nebs- resume home inhalers- symbicort and albuterol Severe tremors- unclear etiology- stopped nebs. Improved after blood transfusion and dialysis,  
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  
JED -  home trilogy, BiPAP inpatient if patient able to tolerate this Depression - cont Paroxetine H/o breast cancer (1992) - s/p bilateral mastectomy/chemo and Endometrial ca s/p hysterectomy Pancytopenia- stable- Etiology unknown - Hematology evaluated- transfused RBCs Skin rash: arms, hydrocortisone topical cream. 
 
Code status: full DVT prophylaxis: warfarin- therapeutic INR Care Plan discussed with: Patient/Family Dispo: - looking for rehab- maybe near [facility] that can manage LVAD, trilogy, and HD Hospital Problems  Date Reviewed: 4/25/2021 Codes Class Noted POA Dyspnea ICD-10-CM: R06.00 
ICD-9-CM: 786.09  4/16/2021 Yes Coagulopathy ([facility] ICD-10-CM: M71.8 ICD-9-CM: 286.9  11/10/2020 Yes Open wound of left lower leg ICD-10-CM: P35.335P ICD-9-CM: 891.0  11/10/2020 Yes Anemia ICD-10-CM: D64.9 ICD-9-CM: 285.9  11/9/2020 Yes NICM (nonischemic cardiomyopathy) ([facility] ICD-10-CM: I42.8 ICD-9-CM: 425.4  11/4/2020 Yes Coronary artery disease involving native coronary artery of native heart without angina pectoris ICD-10-CM: I25.10 ICD-9-CM: 414.01  11/4/2020 Yes Chronic venous insufficiency ICD-10-CM: I87.2 ICD-9-CM: 459.81  11/4/2020 Yes Acute on chronic systolic CHF (congestive heart failure) (HCC) ICD-10-CM: B98.50 ICD-9-CM: 428.23, 428.0  10/27/2020 Yes Obesity, morbid ([facility] ICD-10-CM: E66.01 
ICD-9-CM: 278.01  10/1/2020 Yes Review of Systems: A comprehensive review of systems was negative except for that written in the HPI. Vital Signs:  
 Last 24hrs VS reviewed since prior progress note. Most recent are: 
Visit Vitals BP (!) 80/0 Pulse 81 Temp 97.5 °F (36.4 °C) Resp 17 Ht 5' 7\" (1.702 m) Wt 112 kg (247 lb) SpO2 95% BMI 38.69 kg/m² Patient Vitals for the past 24 hrs: 
 Temp Pulse Resp SpO2  
05/04/21 0840 97.5 °F (36.4 °C) 81 17 95 % 05/04/21 0430 97.5 °F (36.4 °C) 91 16 95 % 05/04/21 0053 98.2 °F (36.8 °C) 78 18 92 % 05/03/21 1956    93 % 05/03/21 1902 98.3 °F (36.8 °C) 82 20 94 % 05/03/21 1521 98.2 °F (36.8 °C) 78 20 94 % 05/03/21 1250 98.2 °F (36.8 °C) 73 16 96 % 05/03/21 1230  72 16 94 % 05/03/21 1215  71 16 96 % 05/03/21 1145  73 16 96 % 05/03/21 1130  72 16 96 % 05/03/21 1115 98.1 °F (36.7 °C) 73 16 95 % 05/03/21 1100  70 16 95 % 05/03/21 1045  73 16 94 % 05/03/21 1030  73 16 94 % 05/03/21 1015  71 16 95 % 05/03/21 1000  70 16 95 % 05/03/21 0950 98.2 °F (36.8 °C) 72 16 96 % Intake/Output Summary (Last 24 hours) at 5/4/2021 2124 Last data filed at 5/4/2021 0430 Gross per 24 hour Intake 660 ml Output 2300 ml Net -1640 ml Physical Examination:  
     
Constitutional: NAD, awake and alert , obese, [employer]   
   
Resp: Decreased breath sounds, no wheezing, no creps. No accessory muscle use CV:  audible LVAD motor GI:  Soft, mild distended, non tender. BS +, no hepatosplenomegaly Musculoskeletal:  mild-mod edema, warm, wounds on bilat LE Neurologic:  Moves all extremities. AAOx3, diffuse mild tremors in upper and lower extremities. Data Review:  
 Review and/or order of clinical lab test 
XR CHEST PORT 
  
INDICATION: Possible aspiration 
  
COMPARISON: 4/26/2021 at 1406 hours 
  
TECHNIQUE: Portable AP upright chest view at 0950 hours 
  
FINDINGS: The support devices are stable. There is stable cardiac silhouette 
enlargement. The pulmonary vasculature is within normal limits.  
  
The lungs and pleural spaces are clear. There is no pneumothorax. The bones and 
upper abdomen are stable. 
  
IMPRESSION 
  
Clear lungs. Stable cardiac silhouette enlargement. 
  
 
Labs:  
 
Recent Labs 05/04/21 
0119 05/03/21 
1768 WBC 3.0* 3.4* HGB 9.3* 8.7* HCT 31.5* 30.2*  160 Recent Labs   05/04/21 
0119 05/03/21 
0243 05/02/21 0017  
 136 137  
K 4.1 4.2 4.4  
 103 104 CO2 28 26 29 BUN 23* 39* 33* CREA 2.50* 3.51* 3.16* GLU 83 92 109* CA 9.0 9.0 9.0 PHOS 3.4 4.2 3.3 Recent Labs 05/04/21 
2458 05/03/21 
6619 05/02/21 
0017 ALT 10* 9* 10* AP 56 53 48 TBILI 0.6 0.6 0.7 TP 7.8 8.0 7.8 ALB 3.2* 3.4* 3.4*  
GLOB 4.6* 4.6* 4.4* Recent Labs 05/04/21 
0452 05/03/21 
6942 05/02/21 
0017 INR 3.6* 3.9* 3.0*  
PTP 35.6* 38.7* 29.5* No results for input(s): FE, TIBC, PSAT, FERR in the last 72 hours. Lab Results Component Value Date/Time Folate 10.0 10/28/2020 03:56 AM  
  
No results for input(s): PH, PCO2, PO2 in the last 72 hours. No results for input(s): CPK, CKNDX, TROIQ in the last 72 hours. No lab exists for component: CPKMB Lab Results Component Value Date/Time Cholesterol, total 129 04/16/2021 10:40 AM  
 HDL Cholesterol 33 04/16/2021 10:40 AM  
 LDL, calculated 74 04/16/2021 10:40 AM  
 Triglyceride 110 04/16/2021 10:40 AM  
 CHOL/HDL Ratio 3.9 04/16/2021 10:40 AM  
 
Lab Results Component Value Date/Time Glucose (POC) 83 05/04/2021 08:49 AM  
 Glucose (POC) 100 05/03/2021 04:53 PM  
 Glucose (POC) 99 05/03/2021 11:18 AM  
 Glucose (POC) 90 05/03/2021 06:16 AM  
 Glucose (POC) 99 05/02/2021 09:32 PM  
 
Lab Results Component Value Date/Time  Color YELLOW/STRAW 04/18/2021 05:08 PM  
 Appearance CLEAR 04/18/2021 05:08 PM  
 Specific gravity 1.016 04/18/2021 05:08 PM  
 Specific gravity 1.013 11/09/2020 12:05 AM  
 pH (UA) 6.5 04/18/2021 05:08 PM  
 Protein 30 (A) 04/18/2021 05:08 PM  
 Glucose Negative 04/18/2021 05:08 PM  
 Ketone Negative 04/18/2021 05:08 PM  
 Bilirubin Negative 04/18/2021 05:08 PM  
 Urobilinogen 0.2 04/18/2021 05:08 PM  
 Nitrites Negative 04/18/2021 05:08 PM  
 Leukocyte Esterase SMALL (A) 04/18/2021 05:08 PM  
 Epithelial cells FEW 04/18/2021 05:08 PM  
 Bacteria Negative 04/18/2021 05:08 PM  
 WBC 0-4 04/18/2021 05:08 PM  
 RBC 0-5 04/18/2021 05:08 PM  
 
 
 
Medications Reviewed:  
 
Current Facility-Administered Medications Medication Dose Route Frequency  insulin lispro (HUMALOG) injection   SubCUTAneous AC&HS  
 dextrose (D50W) injection syrg 12.5-25 g  12.5-25 g IntraVENous PRN  
 hydrocortisone (CORTAID) 1 % cream   Topical BID  insulin glargine (LANTUS) injection 12 Units  12 Units SubCUTAneous QHS  bumetanide (BUMEX) tablet 2 mg  2 mg Oral TID  mineral oil (FLEET) enema   Rectal PRN  
 bisacodyL (DULCOLAX) suppository 10 mg  10 mg Rectal DAILY PRN  
 benzocaine-zinc cl-benzalkonium cl (ORAJEL) 20-0.1-0.02 % mucosal gel   Oral PRN  
 heparin (porcine) 1,000 unit/mL injection 1,100 Units  1,100 Units InterCATHeter DIALYSIS PRN And  
 heparin (porcine) 1,000 unit/mL injection 1,400 Units  1,400 Units InterCATHeter DIALYSIS PRN  
 glucose chewable tablet 16 g  4 Tab Oral PRN  
 dextrose (D50W) injection syrg 12.5-25 g  12.5-25 g IntraVENous PRN  
 glucagon (GLUCAGEN) injection 1 mg  1 mg IntraMUSCular PRN  
 0.9% sodium chloride infusion 250 mL  250 mL IntraVENous PRN  
 budesonide-formoteroL (SYMBICORT) 160-4.5 mcg/actuation HFA inhaler 1 Puff (Patient Supplied)  1 Puff Inhalation BID RT  
 potassium chloride SR (KLOR-CON 10) tablet 20 mEq  20 mEq Oral DAILY  albuterol (PROVENTIL HFA, VENTOLIN HFA, PROAIR HFA) inhaler 2 Puff (Patient Supplied)  2 Puff Inhalation Q4H PRN  
 epoetin amalia-epbx (RETACRIT) injection 20,000 Units  20,000 Units SubCUTAneous Q MON, WED & FRI  
 oxymetazoline (AFRIN) 0.05 % nasal spray 2 Spray  2 Spray Both Nostrils BID PRN  
 sodium chloride (OCEAN) 0.65 % nasal squeeze bottle 2 Spray  2 Spray Both Nostrils Q2H PRN  therapeutic multivitamin (THERAGRAN) tablet 1 Tab  1 Tab Oral DAILY  isosorbide mononitrate ER (IMDUR) tablet 30 mg  30 mg Oral DAILY  cephALEXin (KEFLEX) capsule 250 mg  250 mg Oral BID  sodium chloride (NS) flush 5-40 mL  5-40 mL IntraVENous Q8H  
 sodium chloride (NS) flush 5-40 mL  5-40 mL IntraVENous PRN  
 acetaminophen (TYLENOL) tablet 650 mg  650 mg Oral Q4H PRN  
 hydrALAZINE (APRESOLINE) 20 mg/mL injection 10 mg  10 mg IntraVENous Q4H PRN  
 hydrALAZINE (APRESOLINE) tablet 100 mg  100 mg Oral TID  hydrOXYzine HCL (ATARAX) tablet 10 mg  10 mg Oral TID PRN  
 PARoxetine (PAXIL) tablet 20 mg  20 mg Oral DAILY  ranolazine ER (RANEXA) tablet 1,000 mg  1,000 mg Oral BID  tafamidis cap 61 mg (Patient Supplied)  61 mg Oral DAILY  warfarin dosing per pharmacy   Other Rx Dosing/Monitoring  miconazole (MICOTIN) 2 % powder   Topical BID  
 
______________________________________________________________________ EXPECTED LENGTH OF STAY: 5d 7h 
ACTUAL LENGTH OF STAY:          Belkis Thomason MD

## 2021-05-04 NOTE — PROGRESS NOTES
Problem: Self Care Deficits Care Plan (Adult)  Goal: *Acute Goals and Plan of Care (Insert Text)  Description: FUNCTIONAL STATUS PRIOR TO ADMISSION: Patient was modified independent using a rollator for functional mobility. Pt lives alone, was able to perform dressing with AE (reacher.) She does not wear socks and has slip on shoes with pre tied laces. Pt was sponge bathing at home due to B LE wounds. Pt independently managed her LVAD    HOME SUPPORT PRIOR TO ADMISSION: The patient lived alone with daughter to provide assistance. Will be staying with daughter once discharged. Occupational Therapy Goals  Initiated 4/19/2021; Goals reviewed 4/26/2021, goals downgraded due to myoclonic jerking/tremors impacting performance; OT weekly reassessment 5/4  1. Patient will perform upper body dressing and lower body dressing using AE PRN with modified independence within 7 day(s). Downgrade to only UB dressing with min A; continue 5/4  2. Patient will perform bathing with modified independence within 7 day(s). Downgrade to min A; Continue 5/4  3. Patient will perform standing ADLs at sink with modified independence within 7 day(s). Downgrade to EOB with CGA, upgrade 5/4 to mod I  4. Patient will perform toilet transfers in bathroom with least restrictive DME with modified independence within 7 day(s). Downgrade to [facility] t/f with min A, upgrade 5/4 to mod I  5. Patient will perform all aspects of toileting with modified independence within 7 day(s). Downgrade to min A., continue 5/4  6. Patient will utilize energy conservation techniques during functional activities with verbal cues within 7 day(s).  Continue 5/4            Outcome: Progressing Towards Goal    OCCUPATIONAL THERAPY TREATMENT/WEEKLY RE-ASSESSMENT  Patient: Juan Monroy (68 y.o. female)  Date: 5/4/2021  Diagnosis: Dyspnea [R06.00] <principal problem not specified>  Procedure(s) (LRB):  RIGHT HEART CATH (N/A) 14 Days Post-Op  Precautions: (LVAD)  Chart, occupational therapy assessment, plan of care, and goals were reviewed. ASSESSMENT  Patient continues with skilled OT services and is slowly progressing towards goals. Pt  continues to be limited with balance, safety, ADLs, IADLs and mobility by intermittent myoclonic jerking (noted in BLEs today), decreased activity tolerance, and risk for falls. At baseline, pt independently manages her own LVAD, performs her ADLs and IADLs within the home, with majority of her daily tasks being completed in standing. This date, pt tolerated about 2 minutes of a standing ADL prior to requesting to sit due to fatigue. VSS but pt is well below her functional baseline at this time. She requires A of 2 people for safety with sit to stands (especially from low heights) and requires increased cues for safety with returning to chair. Recommend IPR at discharge as pt is highly motivated to regain her independence. Current Level of Function Impacting Discharge (ADLs): min A x 2/mod A x 1 to stand, increased A from low heights, decreased standing tolerance    Other factors to consider for discharge: from home, alone         PLAN :  Goals have been updated based on progression since last assessment. Patient continues to benefit from skilled intervention to address the above impairments. Continue to follow patient 5 times a week to address goals.     Recommend with staff: OOB to chair meals, BSC with A x 1 and rollator    Recommend next OT session: use of AE for full body dressing, retrieval of ADL items    Recommendation for discharge: (in order for the patient to meet his/her long term goals)  Therapy 3 hours per day 5-7 days per week    This discharge recommendation:  Has been made in collaboration with the attending provider and/or case management    IF patient discharges home will need the following DME: TBD- likely none as she has needed AE for LB ADLs       SUBJECTIVE:   Patient stated I use a sock thing to get my socks on at home.    OBJECTIVE DATA SUMMARY:   Cognitive/Behavioral Status:                      Functional Mobility and Transfers for ADLs:  Bed Mobility:  Pt received sitting in recliner     Transfers:  Sit to Stand: Mod A x 1 (min A x 2) to stand from chair, cues hand placement from recliner chair          Balance:  Sitting: Impaired  Sitting - Static: Good (unsupported)  Sitting - Dynamic: Fair (occasional)  Standing: Impaired  Standing - Static: Fair  Standing - Dynamic : Fair    ADL Intervention:       Grooming: pt required CGA with rollator to mobilize to bathroom where she stood at sink about 2 minutes for task, then reported need to sit due to fatigue. Washing Face: Stand-by assistance  Washing Hands: Stand-by assistance  Brushing Teeth: Stand-by assistance                   Lower Body Dressing Assistance  Socks: Total assistance (dependent)(uses sock aide at home)        Pain:  none    Activity Tolerance:   Fair and requires rest breaks    After treatment patient left in no apparent distress:   Sitting in chair and Call bell within reach    COMMUNICATION/COLLABORATION:   The patients plan of care was discussed with: Physical therapy assistant, Registered nurse, Case management, and Rehabilitation technician.      Belkys Black OT  Time Calculation: 17 mins

## 2021-05-04 NOTE — PROGRESS NOTES
Comprehensive Nutrition Assessment    Type and Reason for Visit: Reassess    Nutrition Recommendations/Plan:    1. Continue to encourage high protein foods with all meals  2. Add daily MVI for wound healing    Nutrition Assessment:    Pt admitted for dypsnea. PMHx: heart failure s/p LVAD, COPD, CKD3, pulmonary HTN, Depression, HTN, and HLD. Advanced Heart Failure Team following. ROCIO on CKD due to CRS with HD started on 4/26. Plans to continue HD with permacath placement on hold for INR. WOCN following for multiple wound POA including chronic sternal wound (see below). Lantus dose reduced today. Constipation noted but had diarrhea with laxative (however was also on lactulose (4/27 to 4/29) for elevated ammonia - now WNL and lactulose off). Discussed bowel regimen for maintenance. Gas-x at bedside which pt uses at home since gas a chronic issue. Pt & daughter visited today. Intake good for most meals over past few days per daughter reports. Order continues to be an issue so meal orders for next 2 days updated (likes boiled eggs, raisin bran, frosted flakes). Mouth sores improving. Per wound care notes 4/23:  1. POA Right medial lower leg wound - Full thickness  4. POA Stage 3 pressure injury to right buttock - healing    Malnutrition Assessment:  Malnutrition Status:  No malnutrition      Nutritionally Significant Medications: bumex, retacrit, lantus (12 units), paxil, miralax, KCl, ranexa, coumadin, MVI    Estimated Daily Nutrient Needs:  Energy (kcal): 2233-2436kcal(MSJ x 1.2 x 1.1-1.2 ); Weight Used for Energy Requirements: Current(113kg)  Protein (g): 135g (1.2g/kg);  Weight Used for Protein Requirements: Current(113kg)  Fluid (ml/day): 2100 or per cardio; Method Used for Fluid Requirements: 1 ml/kcal    Nutrition Related Findings:       BM: 5/1  Edema: trace  Wounds:  (See WOCN note)       Current Nutrition Therapies:   Diet: consistent CHO  Supplements: none  Meal intake:   Patient Vitals for the past 168 hrs:   % Diet Eaten   05/03/21 1822 76 - 100%   05/03/21 1448 76 - 100%   05/02/21 0951 1 - 25%   05/01/21 1843 1 - 25%   04/30/21 1535 26 - 50%   04/30/21 0945 0   04/29/21 1105 76 - 100%   04/29/21 0859 76 - 100%   04/28/21 1500 76 - 100%   04/28/21 1115 76 - 100%   04/28/21 0813 76 - 100%   04/27/21 1500 51 - 75%     Anthropometric Measures:  · Height:  5' 7\" (170.2 cm)  · Current Body Wt:  113 kg (249 lb 1.9 oz)   · Ideal Body Wt:  135:  184.5 %   Wt Readings from Last 10 Encounters:   05/04/21 112 kg (247 lb)   04/15/21 122.9 kg (271 lb)   04/15/21 122.9 kg (271 lb)   03/16/21 124.7 kg (275 lb)   03/15/21 124.7 kg (275 lb)   02/09/21 127.9 kg (282 lb)   01/07/21 127.9 kg (282 lb)   12/08/20 128.8 kg (284 lb)   11/20/20 128 kg (282 lb 3 oz)   11/08/20 90.7 kg (200 lb)     Nutrition Diagnosis:   · Increased nutrient needs related to renal dysfunction(wound healing) as evidenced by wounds, dialysis    Nutrition Interventions:   Food and/or Nutrient Delivery: Continue current diet  Nutrition Education and Counseling: Education completed  Coordination of Nutrition Care: Continue to monitor while inpatient    Goals:  Continued consumption of at least 75% meals; high protein food with each meal       Nutrition Monitoring and Evaluation:   Behavioral-Environmental Outcomes: None identified  Food/Nutrient Intake Outcomes: Food and nutrient intake  Physical Signs/Symptoms Outcomes: Weight, Fluid status or edema    Discharge Planning:     Too soon to determine     Eric Mann, RD  [address] [place] , Pager #[phone] or via [place]

## 2021-05-04 NOTE — PROGRESS NOTES
1930  Bedside shift change report given to Angela Jernigan (oncoming nurse) by Shahbaz Ashraf (offgoing nurse). Report included the following information SBAR, Kardex, Intake/Output, MAR, Accordion, Recent Results and Cardiac Rhythm Paced. 0730  Bedside shift change report given to Tricia Gill (oncoming nurse) by Angela Jernigan (offgoing nurse). Report included the following information SBAR, Kardex, Intake/Output, MAR, Accordion, Recent Results and Cardiac Rhythm Paced.

## 2021-05-04 NOTE — PROGRESS NOTES
[facility] VAD/Heart Failure Progress Note    Admit Date: 2021  POD:  14 Days Post-Op    Procedure:  Procedure(s):  RIGHT HEART CATH        Subjective:   Dyspnea and tremors improved on HD; good flows      Objective:   Vitals:  Blood pressure (!) 80/0, pulse 94, temperature 97.7 °F (36.5 °C), resp. rate 22, height 5' 7\" (1.702 m), weight 247 lb (112 kg), SpO2 94 %. Temp (24hrs), Av.8 °F (36.6 °C), Min:97.5 °F (36.4 °C), Max:98.3 °F (36.8 °C)    Hemodynamics:   CO:     CI:     CVP:     SVR:     PAP Systolic:     PAP Diastolic:     PVR:     QY61:     SCV02:      VAD Interrogation: LVAD (Heartmate)  Pump Speed (RPM): 9400  Pump Flow (LPM): 5.5  Chatter in Lines: No  PI (Pulsitility Index): 5  Power: 6  MAP: 82   Test: Yes  Back Up  at Bedside & Labeled: Yes  Power Module Test: Yes  Driveline Site Care: No  Driveline Dressing: Clean, Dry, and Intact    EKG/Rhythm:      Extubation Date / Time:     CT Output:     Ventilator:  Ventilator Volumes  Vt Spont (ml): 435 ml (21 025)  Ve Observed (l/min): 6.7 l/min (21 025)    Oxygen Therapy:  Oxygen Therapy  O2 Sat (%): 94 % (21 1508)  Pulse via Oximetry: 83 beats per minute (21 1956)  O2 Device: None (Room air) (21 1508)  Skin Assessment: Clean, dry, & intact (21 0932)  Skin Protection for O2 Device: No (21 025)  O2 Flow Rate (L/min): 2 l/min (21 0300)  FIO2 (%): 21 % (21 0826)    CXR:    Admission Weight: Last Weight   Weight: 270 lb 6.4 oz (122.7 kg) Weight: 247 lb (112 kg)     Intake / Output / Drain:  Current Shift:  0701 -  1900  In: 820 [P.O.:820]  Out: 325 [Urine:325]  Last 24 hrs.:     Intake/Output Summary (Last 24 hours) at 2021 1753  Last data filed at 2021 1508  Gross per 24 hour   Intake 1120 ml   Output 500 ml   Net 620 ml           No results for input(s): CPK, CKMB, TROIQ in the last 72 hours.   Recent Labs     21  0119 05/03/21  0243 05/02/21  0017    136 137   K 4.1 4.2 4.4   CO2 28 26 29   BUN 23* 39* 33*   CREA 2.50* 3.51* 3.16*   GLU 83 92 109*   PHOS 3.4 4.2 3.3   WBC 3.0* 3.4* 3.2*   HGB 9.3* 8.7* 8.5*   HCT 31.5* 30.2* 29.2*    160 145*     Recent Labs     05/04/21  0119 05/03/21  0243 05/02/21  0017   INR 3.6* 3.9* 3.0*   PTP 35.6* 38.7* 29.5*     No lab exists for component: PBNP        Current Facility-Administered Medications:     insulin lispro (HUMALOG) injection, , SubCUTAneous, AC&HS, Festus Ribeiro MD, Stopped at 05/04/21 1630    dextrose (D50W) injection syrg 12.5-25 g, 12.5-25 g, IntraVENous, PRN, Festus Ribeiro MD    hydrocortisone (CORTAID) 1 % cream, , Topical, BID, Festus Ribeiro MD, Given at 05/04/21 1800    insulin glargine (LANTUS) injection 12 Units, 12 Units, SubCUTAneous, QHS, Festus Ribeiro MD    bumetanide (BUMEX) tablet 2 mg, 2 mg, Oral, TID, Audrey Callahan MD, 2 mg at 05/04/21 1658    mineral oil (FLEET) enema, , Rectal, PRN, Festus Ribeiro MD    bisacodyL (DULCOLAX) suppository 10 mg, 10 mg, Rectal, DAILY PRN, Braxton Triny B, NP, 10 mg at 04/28/21 1950    benzocaine-zinc cl-benzalkonium cl (ORAJEL) 20-0.1-0.02 % mucosal gel, , Oral, PRN, Cecilio Kong MD, Given at 05/04/21 1004    heparin (porcine) 1,000 unit/mL injection 1,100 Units, 1,100 Units, InterCATHeter, DIALYSIS PRN, 1,100 Units at 05/03/21 1004 **AND** heparin (porcine) 1,000 unit/mL injection 1,400 Units, 1,400 Units, InterCATHeter, DIALYSIS PRN, Max Malone MD, 1,400 Units at 05/03/21 1004    glucose chewable tablet 16 g, 4 Tab, Oral, PRN, Cecilio Kong MD    glucagon (GLUCAGEN) injection 1 mg, 1 mg, IntraMUSCular, PRN, Cecilio Kong MD    0.9% sodium chloride infusion 250 mL, 250 mL, IntraVENous, PRN, Johana Moody NP    budesonide-formoteroL (SYMBICORT) 160-4.5 mcg/actuation HFA inhaler 1 Puff (Patient Supplied), 1 Puff, Inhalation, BID RT, Cecilio Kong MD, 1 Puff at 05/04/21 0900    potassium chloride SR (KLOR-CON 10) tablet 20 mEq, 20 mEq, Oral, DAILY, Polliard, Sachi MCGREGOR, NP, 20 mEq at 05/04/21 1003    albuterol (PROVENTIL HFA, VENTOLIN HFA, PROAIR HFA) inhaler 2 Puff (Patient Supplied), 2 Puff, Inhalation, Q4H PRN, Kitty Rahman MD    epoetin amalia-epbx (RETACRIT) injection 20,000 Units, 20,000 Units, SubCUTAneous, Q MON, WED & Jaspreet Chappell MD, 20,000 Units at 05/03/21 2130    oxymetazoline (AFRIN) 0.05 % nasal spray 2 Spray, 2 Spray, Both Nostrils, BID PRN, Yady Walter, NP, 2 Spray at 04/22/21 1800    sodium chloride (OCEAN) 0.65 % nasal squeeze bottle 2 Spray, 2 Spray, Both Nostrils, Q2H PRN, Jose Angel, Yady Hernandez, NP    therapeutic multivitamin (THERAGRAN) tablet 1 Tab, 1 Tab, Oral, DAILY, Polliard, Sachi MCGREGOR, NP, 1 Tab at 05/04/21 1001    isosorbide mononitrate ER (IMDUR) tablet 30 mg, 30 mg, Oral, DAILY, Polliard, Sachi Dillon T, NP, 30 mg at 05/04/21 1001    cephALEXin (KEFLEX) capsule 250 mg, 250 mg, Oral, BID, Danyelle Richey MD, 250 mg at 05/04/21 1700    sodium chloride (NS) flush 5-40 mL, 5-40 mL, IntraVENous, Q8H, Polliard, Sachi Dillon T, NP, 10 mL at 05/04/21 1400    sodium chloride (NS) flush 5-40 mL, 5-40 mL, IntraVENous, PRN, Polliard, Sawyman Santana T, NP, 10 mL at 04/22/21 1238    acetaminophen (TYLENOL) tablet 650 mg, 650 mg, Oral, Q4H PRN, PolliardAndrésa MECHE, NP, 650 mg at 05/04/21 1241    hydrALAZINE (APRESOLINE) 20 mg/mL injection 10 mg, 10 mg, IntraVENous, Q4H PRN, Sachi Walter, NP, 10 mg at 04/21/21 4500    hydrALAZINE (APRESOLINE) tablet 100 mg, 100 mg, Oral, TID, Sachi Walter, NP, 100 mg at 05/04/21 1659    hydrOXYzine HCL (ATARAX) tablet 10 mg, 10 mg, Oral, TID PRN, Yady Walter NP    PARoxetine (PAXIL) tablet 20 mg, 20 mg, Oral, DAILY, Sachi Walter NP, 20 mg at 05/04/21 1002    ranolazine ER (RANEXA) tablet 1,000 mg, 1,000 mg, Oral, BID, Sachi Walter NP, 1,000 mg at 05/04/21 1700    tafamidis cap 61 mg (Patient Supplied), 61 mg, Oral, DAILY, Hans Walter NP, 61 mg at 05/04/21 1002    warfarin dosing per pharmacy, , Other, Rx Dosing/Monitoring, Ruth Youssef NP    miconazole (MICOTIN) 2 % powder, , Topical, BID, Vivien Cisneros MD, Given at 05/04/21 1700    A/P     S/P LVAD - good flows  [facility]  Fluid overload - diuresis  Acute kidney injury - monitor      Risk of morbidity and mortality - high  Medical decision making - high complexity    Signed By: Aydin Gamino MD

## 2021-05-04 NOTE — PROGRESS NOTES
Problem: Mobility Impaired (Adult and Pediatric)  Goal: *Acute Goals and Plan of Care (Insert Text)  Description: FUNCTIONAL STATUS PRIOR TO ADMISSION: Patient was modified independent using a rollator for functional mobility. HOME SUPPORT PRIOR TO ADMISSION: The patient lived alone with daughter to provide assistance. Will be staying with daughter once discharged. Physical Therapy Goals  Reviewed 5/3/2021   1. Patient will move from supine to sit and sit to supine , scoot up and down, and roll side to side in bed with minimal assistance/contact guard assist within 7 day(s). 2.  Patient will transfer from bed to chair and chair to bed with minimal assistance/contact guard assist using the least restrictive device within 7 day(s). 3.  Patient will perform sit to stand with minimal assistance/contact guard assist within 7 day(s). 4.  Patient will ambulate with supervision for 50 feet with the least restrictive device within 7 days. 5.  Patient will stand at bedside with rollator for 5 minutes within 7 days. Revised 4/26/2021  1. Patient will move from supine to sit and sit to supine , scoot up and down, and roll side to side in bed with minimal assistance/contact guard assist within 7 day(s). 2.  Patient will transfer from bed to chair and chair to bed with minimal assistance/contact guard assist using the least restrictive device within 7 day(s). 3.  Patient will perform sit to stand with minimal assistance/contact guard assist within 7 day(s). 4.  Patient will sit at EOB with supervision for balance engaged in task for 5 minutes within 7 day(s). Physical Therapy Goals  Initiated 4/17/2021  1. Patient will move from supine to sit and sit to supine  and scoot up and down in bed with modified independence within 7 day(s). 2.  Patient will transfer from bed to chair and chair to bed with modified independence using the least restrictive device within 7 day(s).   3.  Patient will perform sit to stand with modified independence within 7 day(s). 4.  Patient will ambulate with modified independence for 150 feet with the least restrictive device within 7 day(s). Outcome: Progressing Towards Goal     PHYSICAL THERAPY TREATMENT  Patient: Roxanne Orta (18 y.o. female)  Date: 5/4/2021  Diagnosis: Dyspnea [R06.00] <principal problem not specified>  Procedure(s) (LRB):  RIGHT HEART CATH (N/A) 14 Days Post-Op  Precautions: (LVAD)  Chart, physical therapy assessment, plan of care and goals were reviewed. ASSESSMENT  Patient continues with skilled PT services and is progressing towards goals. Pt was able to increase ambulation. Noted fatigue with task requiring seated rest break. Pt had feet crossed up or step on own foot causing a right lateral collapse in the  chair. Pt required max A x2 to recover and position correctly in the chair. Pt expressing no pain post incident. Pt reports feeling alright. Notified RN    Current Level of Function Impacting Discharge (mobility/balance): Max A x2    Other factors to consider for discharge: decrease mobility and independence          PLAN :  Patient continues to benefit from skilled intervention to address the above impairments. Continue treatment per established plan of care. to address goals. Recommendation for discharge: (in order for the patient to meet his/her long term goals)  Therapy 3 hours per day 5-7 days per week    This discharge recommendation:  Has not yet been discussed the attending provider and/or case management    IF patient discharges home will need the following DME: patient owns DME required for discharge       SUBJECTIVE:   Patient stated I need to breath.     OBJECTIVE DATA SUMMARY:   Critical Behavior:  Neurologic State: Alert, Appropriate for age  Orientation Level: Oriented X4  Cognition: Appropriate decision making, Appropriate for age attention/concentration, Appropriate safety awareness, Follows commands, Recognition of people/places  Safety/Judgement: Decreased insight into deficits, Decreased awareness of need for safety  Functional Mobility Training:  Bed Mobility:     Supine to Sit: Moderate assistance              Transfers:  Sit to Stand: Contact guard assistance;Minimum assistance(height dependent)  Stand to Sit: Minimum assistance(to w/c. Max A x2 due to triping into chair)                             Balance:  Sitting: Impaired  Sitting - Static: Good (unsupported)  Sitting - Dynamic: Fair (occasional)  Standing: Impaired  Standing - Static: Fair  Standing - Dynamic : Fair  Ambulation/Gait Training:  Distance (ft): 40 Feet (ft)(x2 extended seated rest break)  Assistive Device: Gait belt;Walker, rollator  Ambulation - Level of Assistance: Minimal assistance(+ w/c follow)        Gait Abnormalities: Decreased step clearance        Base of Support: Widened        Step Length: Left shortened;Right shortened                  Fatigues with gait  Stairs: Therapeutic Exercises:     Pain Rating:  No complaints     Activity Tolerance:   observed SOB with activity    After treatment patient left in no apparent distress:   Sitting in chair, Call bell within reach, and Caregiver / family present    COMMUNICATION/COLLABORATION:   The patients plan of care was discussed with: Occupational therapist and Registered nurse.      Kevin Aguilar PTA   Time Calculation: 38 mins

## 2021-05-04 NOTE — PROGRESS NOTES
Problem: Falls - Risk of  Goal: *Absence of Falls  Description: Document Krzysztof Caro Fall Risk and appropriate interventions in the flowsheet. Outcome: Progressing Towards Goal  Note: Fall Risk Interventions:  Mobility Interventions: Communicate number of staff needed for ambulation/transfer, Patient to call before getting OOB         Medication Interventions: Patient to call before getting OOB, Teach patient to arise slowly    Elimination Interventions: Call light in reach, Stay With Me (per policy), Patient to call for help with toileting needs    History of Falls Interventions: Room close to nurse's station, Door open when patient unattended(No bed alarm per wound care)         Problem: Patient Education: Go to Patient Education Activity  Goal: Patient/Family Education  Outcome: Progressing Towards Goal     Problem: Pressure Injury - Risk of  Goal: *Prevention of pressure injury  Description: Document Jaime Scale and appropriate interventions in the flowsheet.   Outcome: Progressing Towards Goal  Note: Pressure Injury Interventions:  Sensory Interventions: Assess changes in LOC, Minimize linen layers, Maintain/enhance activity level, Keep linens dry and wrinkle-free    Moisture Interventions: Limit adult briefs, Maintain skin hydration (lotion/cream), Moisture barrier, Minimize layers    Activity Interventions: Increase time out of bed, PT/OT evaluation    Mobility Interventions: HOB 30 degrees or less, Pressure redistribution bed/mattress (bed type)    Nutrition Interventions: Document food/fluid/supplement intake    Friction and Shear Interventions: Lift sheet, HOB 30 degrees or less                Problem: Patient Education: Go to Patient Education Activity  Goal: Patient/Family Education  Outcome: Progressing Towards Goal     Problem: Heart Failure: Day 5  Goal: Off Pathway (Use only if patient is Off Pathway)  Outcome: Progressing Towards Goal  Goal: Activity/Safety  Outcome: Progressing Towards Goal  Goal: Diagnostic Test/Procedures  Outcome: Progressing Towards Goal  Goal: Nutrition/Diet  Outcome: Progressing Towards Goal  Goal: Discharge Planning  Outcome: Progressing Towards Goal  Goal: Medications  Outcome: Progressing Towards Goal  Goal: Respiratory  Outcome: Progressing Towards Goal  Goal: Treatments/Interventions/Procedures  Outcome: Progressing Towards Goal  Goal: Psychosocial  Outcome: Progressing Towards Goal

## 2021-05-04 NOTE — PROGRESS NOTES
SW met with patient in herroom on [facility], her daughter Silvestre Carrera had left the room for a business call. Patient stated she will be moving in permanently with Silvestre Carrera in [place], [place]. Patient stated she hasn't heard definitively, but she believes she will be going to [facility] at discharge and then to [facility] home and will have HD with [facility] in [facility]. Wanda Riley is not currently trained on LVAD as of yet. CM and FC working to get LVAD training complete before patient requires outpatient HD. Patient stated she thinks will be getting a permacath tomorrow and HD and hopefully going to [facility] on Thursday. TIMOTHY will continue to follow.

## 2021-05-04 NOTE — PROGRESS NOTES
0730: Bedside shift change report given to Manpreet Sutton (oncoming nurse) by Chrissie Rao (offgoing nurse). Report included the following information SBAR, Kardex, Intake/Output, MAR, Accordion, Recent Results and Cardiac Rhythm paced.

## 2021-05-05 NOTE — PROGRESS NOTES
[facility] 
 [address] [facility], [address] [facility] Phone: [phone]  
  
Nephrology Progress Note 
Earlene Perez     1950     [id number] Date of Admission : 4/16/2021 05/05/21 CC: Follow up for ROCIO Assessment and Plan ROCIO on CKD: 
- likely 2/2 progressive CKD and CRS 
- dialysis initiated 4/26 and tolerating well 
- HD MWF for now - PC planned for this AM 
- HD later today 
- ok to remove sahu - MALAIKA working on outpt unit in [facility] Encephalopathy: 
- appears to be 2/2 uremia 
- now resolved after dialysis CKD stage III. - baseline around 2 
- presumed 2/2 DM, HTN. 
- Bone marrow Bx not done. - Renal US appearance of Kidneys not suggestive of Amyloidosis  
  
Chronic systolic CHF, stage C NYHA class IV 
-Combined ischemic and nonischemic cardiomyopathy 
-S/p HM 2 LVAD implant 4/15/2017 by Dr. Juan M Woodson at [facility] 
- hx of recurrent VT : off mexiletine - chronic RV failure. 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 Chronic anemia :  
+ve PYP testing.  
- anemia of chronic disease  
- continue HEIKE  MWF Severe COPD. Pulmonary hypertension. Chronic A. fib. History of endometrial Ca ATTR amyloidosis Interval History: 
Seen and examined. Feeling ok. No cp, sob, n/v/d. Awaiting PC, hopefully this AM.  Dialysis planned later today. Review of Systems: A comprehensive review of systems was negative except for that written in the HPI. Current Medications:  
Current Facility-Administered Medications Medication Dose Route Frequency  warfarin (COUMADIN) tablet 2 mg  2 mg Oral ONCE  
 insulin lispro (HUMALOG) injection   SubCUTAneous AC&HS  
 dextrose (D50W) injection syrg 12.5-25 g  12.5-25 g IntraVENous PRN  
 hydrocortisone (CORTAID) 1 % cream   Topical BID  insulin glargine (LANTUS) injection 12 Units  12 Units SubCUTAneous QHS  bumetanide (BUMEX) tablet 2 mg  2 mg Oral TID  mineral oil (FLEET) enema   Rectal PRN  
 bisacodyL (DULCOLAX) suppository 10 mg  10 mg Rectal DAILY PRN  
 benzocaine-zinc cl-benzalkonium cl (ORAJEL) 20-0.1-0.02 % mucosal gel   Oral PRN  
 heparin (porcine) 1,000 unit/mL injection 1,100 Units  1,100 Units InterCATHeter DIALYSIS PRN And  
 heparin (porcine) 1,000 unit/mL injection 1,400 Units  1,400 Units InterCATHeter DIALYSIS PRN  
 glucose chewable tablet 16 g  4 Tab Oral PRN  
 glucagon (GLUCAGEN) injection 1 mg  1 mg IntraMUSCular PRN  
 0.9% sodium chloride infusion 250 mL  250 mL IntraVENous PRN  
 budesonide-formoteroL (SYMBICORT) 160-4.5 mcg/actuation HFA inhaler 1 Puff (Patient Supplied)  1 Puff Inhalation BID RT  
 potassium chloride SR (KLOR-CON 10) tablet 20 mEq  20 mEq Oral DAILY  albuterol (PROVENTIL HFA, VENTOLIN HFA, PROAIR HFA) inhaler 2 Puff (Patient Supplied)  2 Puff Inhalation Q4H PRN  
 epoetin amalia-epbx (RETACRIT) injection 20,000 Units  20,000 Units SubCUTAneous Q MON, WED & FRI  
 oxymetazoline (AFRIN) 0.05 % nasal spray 2 Spray  2 Spray Both Nostrils BID PRN  
 sodium chloride (OCEAN) 0.65 % nasal squeeze bottle 2 Spray  2 Spray Both Nostrils Q2H PRN  therapeutic multivitamin (THERAGRAN) tablet 1 Tab  1 Tab Oral DAILY  isosorbide mononitrate ER (IMDUR) tablet 30 mg  30 mg Oral DAILY  cephALEXin (KEFLEX) capsule 250 mg  250 mg Oral BID  sodium chloride (NS) flush 5-40 mL  5-40 mL IntraVENous Q8H  
 sodium chloride (NS) flush 5-40 mL  5-40 mL IntraVENous PRN  
 acetaminophen (TYLENOL) tablet 650 mg  650 mg Oral Q4H PRN  
 hydrALAZINE (APRESOLINE) 20 mg/mL injection 10 mg  10 mg IntraVENous Q4H PRN  
 hydrALAZINE (APRESOLINE) tablet 100 mg  100 mg Oral TID  hydrOXYzine HCL (ATARAX) tablet 10 mg  10 mg Oral TID PRN  
 PARoxetine (PAXIL) tablet 20 mg  20 mg Oral DAILY  ranolazine ER (RANEXA) tablet 1,000 mg  1,000 mg Oral BID  tafamidis cap 61 mg (Patient Supplied)  61 mg Oral DAILY  warfarin dosing per pharmacy   Other Rx Dosing/Monitoring  miconazole (MICOTIN) 2 % powder   Topical BID Allergies Allergen Reactions  Benzodiazepines Other (comments) Respiratory issues Objective: 
Vitals:  
Vitals:  
 05/04/21 1927 05/04/21 2327 05/05/21 0320 05/05/21 7512 Pulse: 83 96 85 79 Resp: 22 24 20 19 Temp: 98.2 °F (36.8 °C) 98.3 °F (36.8 °C) 97.9 °F (36.6 °C) 98.1 °F (36.7 °C) TempSrc:      
SpO2: 94% 92% 91% 92% Weight:   113.5 kg (250 lb 3.6 oz) Height:      
 
Intake and Output: 
No intake/output data recorded. 05/03 1901 - 05/05 0700 In: 1120 [P.O.:1120] Out: 750 [Urine:750] Physical Examination: 
General: Morbidly obese, in NAD [place] Neck: Supple,no mass palpable Lungs : CTA  
CVS: RRR, VAD sounds Abdomen: Soft, NT, BS +, obese Extremities: LE discoloration, LUE lymphedema Neurologic: Awake, alert, minimal tremors now Access: Casandra [place] in place  : sahu 
  
 
[]    High complexity decision making was performed 
[]    Patient is at high-risk of decompensation with multiple organ involvement Lab Data Personally Reviewed: I have reviewed all the pertinent labs, microbiology data and radiology studies during assessment. Recent Labs 05/05/21 
0891 05/05/21 
4673 05/04/21 
0119 05/03/21 
0212 NA  --  138 137 136 K  --  4.3 4.1 4.2 CL  --  104 104 103 CO2  --  26 28 26 GLU  --  107* 83 92 BUN  --  34* 23* 39* CREA  --  3.47* 2.50* 3.51* CA  --  9.1 9.0 9.0 PHOS  --  4.6 3.4 4.2 ALB  --  3.4* 3.2* 3.4* ALT  --  10* 10* 9* INR 2.3*  --  3.6* 3.9* Recent Labs 05/05/21 
1710 05/04/21 
0119 05/03/21 
6249 WBC 3.2* 3.0* 3.4* HGB 9.1* 9.3* 8.7* HCT 31.9* 31.5* 30.2*  169 160 No results found for: SDES Lab Results Component Value Date/Time  Culture result: NO GROWTH 5 DAYS 04/23/2021 06:51 PM  
 Culture result: NO GROWTH 5 DAYS 11/10/2020 06:42 PM  
 Culture result: No Growth (<1000 cfu/mL) 11/09/2020 12:05 AM  
 Culture result: SCANT Morganella morganii ssp morganii (A) 10/30/2020 11:30 AM  
 Culture result: SCANT Morganella morganii ssp morganii (A) 10/30/2020 11:30 AM  
 Culture result: RARE DIPHTHEROIDS (A) 10/30/2020 11:30 AM  
 
Recent Results (from the past 24 hour(s)) GLUCOSE, POC Collection Time: 05/04/21  8:49 AM  
Result Value Ref Range Glucose (POC) 83 65 - 100 mg/dL Performed by Daniella Hernandez GLUCOSE, POC Collection Time: 05/04/21 11:07 AM  
Result Value Ref Range Glucose (POC) 168 (H) 65 - 100 mg/dL Performed by [address] Field GLUCOSE, POC Collection Time: 05/04/21  4:34 PM  
Result Value Ref Range Glucose (POC) 130 (H) 65 - 100 mg/dL Performed by [address] Field GLUCOSE, POC Collection Time: 05/04/21  9:07 PM  
Result Value Ref Range Glucose (POC) 120 (H) 65 - 100 mg/dL Performed by Vesta HAJI   
NT-PRO BNP Collection Time: 05/05/21  3:41 AM  
Result Value Ref Range NT pro-BNP 8,751 (H) <125 PG/ML  
LD Collection Time: 05/05/21  3:41 AM  
Result Value Ref Range  81 - 246 U/L  
PHOSPHORUS Collection Time: 05/05/21  3:41 AM  
Result Value Ref Range Phosphorus 4.6 2.6 - 4.7 MG/DL  
AMMONIA Collection Time: 05/05/21  3:41 AM  
Result Value Ref Range Ammonia 30 <78 UMOL/L  
METABOLIC PANEL, COMPREHENSIVE Collection Time: 05/05/21  3:41 AM  
Result Value Ref Range Sodium 138 136 - 145 mmol/L Potassium 4.3 3.5 - 5.1 mmol/L Chloride 104 97 - 108 mmol/L  
 CO2 26 21 - 32 mmol/L Anion gap 8 5 - 15 mmol/L Glucose 107 (H) 65 - 100 mg/dL BUN 34 (H) 6 - 20 MG/DL Creatinine 3.47 (H) 0.55 - 1.02 MG/DL  
 BUN/Creatinine ratio 10 (L) 12 - 20 GFR est AA 16 (L) >60 ml/min/1.73m2 GFR est non-AA 13 (L) >60 ml/min/1.73m2 Calcium 9.1 8.5 - 10.1 MG/DL Bilirubin, total 0.6 0.2 - 1.0 MG/DL  
 ALT (SGPT) 10 (L) 12 - 78 U/L  
 AST (SGOT) 21 15 - 37 U/L Alk.  phosphatase 52 45 - 117 U/L  
 Protein, total 7.6 6.4 - 8.2 g/dL Albumin 3.4 (L) 3.5 - 5.0 g/dL Globulin 4.2 (H) 2.0 - 4.0 g/dL A-G Ratio 0.8 (L) 1.1 - 2.2 PROTHROMBIN TIME + INR Collection Time: 05/05/21  3:42 AM  
Result Value Ref Range INR 2.3 (H) 0.9 - 1.1 Prothrombin time 23.5 (H) 9.0 - 11.1 sec CBC WITH AUTOMATED DIFF Collection Time: 05/05/21  3:42 AM  
Result Value Ref Range WBC 3.2 (L) 3.6 - 11.0 K/uL  
 RBC 3.48 (L) 3.80 - 5.20 M/uL HGB 9.1 (L) 11.5 - 16.0 g/dL HCT 31.9 (L) 35.0 - 47.0 % MCV 91.7 80.0 - 99.0 FL  
 MCH 26.1 26.0 - 34.0 PG  
 MCHC 28.5 (L) 30.0 - 36.5 g/dL RDW 20.8 (H) 11.5 - 14.5 % PLATELET 044 833 - 599 K/uL MPV 9.5 8.9 - 12.9 FL  
 NRBC 0.0 0  WBC ABSOLUTE NRBC 0.00 0.00 - 0.01 K/uL NEUTROPHILS 70 32 - 75 % LYMPHOCYTES 17 12 - 49 % MONOCYTES 11 5 - 13 % EOSINOPHILS 0 0 - 7 % BASOPHILS 1 0 - 1 % IMMATURE GRANULOCYTES 1 (H) 0.0 - 0.5 % ABS. NEUTROPHILS 2.3 1.8 - 8.0 K/UL  
 ABS. LYMPHOCYTES 0.5 (L) 0.8 - 3.5 K/UL  
 ABS. MONOCYTES 0.4 0.0 - 1.0 K/UL  
 ABS. EOSINOPHILS 0.0 0.0 - 0.4 K/UL  
 ABS. BASOPHILS 0.0 0.0 - 0.1 K/UL  
 ABS. IMM. GRANS. 0.0 0.00 - 0.04 K/UL  
 DF SMEAR SCANNED    
 RBC COMMENTS ANISOCYTOSIS 2+ GLUCOSE, POC Collection Time: 05/05/21  6:46 AM  
Result Value Ref Range Glucose (POC) 109 (H) 65 - 100 mg/dL Performed by Yessenia Manny I have reviewed the flowsheets. Chart and Pertinent Notes have been reviewed. No change in PMH ,family and social history from Consult note.  
 
 
Joann Lal MD

## 2021-05-05 NOTE — PROGRESS NOTES
Problem: Mobility Impaired (Adult and Pediatric) Goal: *Acute Goals and Plan of Care (Insert Text) Description: FUNCTIONAL STATUS PRIOR TO ADMISSION: Patient was modified independent using a rollator for functional mobility. HOME SUPPORT PRIOR TO ADMISSION: The patient lived alone with daughter to provide assistance. Will be staying with daughter once discharged. Physical Therapy Goals Reviewed 5/3/2021 1. Patient will move from supine to sit and sit to supine , scoot up and down, and roll side to side in bed with minimal assistance/contact guard assist within 7 day(s). 2.  Patient will transfer from bed to chair and chair to bed with minimal assistance/contact guard assist using the least restrictive device within 7 day(s). 3.  Patient will perform sit to stand with minimal assistance/contact guard assist within 7 day(s). 4.  Patient will ambulate with supervision for 50 feet with the least restrictive device within 7 days. 5.  Patient will stand at bedside with rollator for 5 minutes within 7 days. Revised 4/26/2021 1. Patient will move from supine to sit and sit to supine , scoot up and down, and roll side to side in bed with minimal assistance/contact guard assist within 7 day(s). 2.  Patient will transfer from bed to chair and chair to bed with minimal assistance/contact guard assist using the least restrictive device within 7 day(s). 3.  Patient will perform sit to stand with minimal assistance/contact guard assist within 7 day(s). 4.  Patient will sit at EOB with supervision for balance engaged in task for 5 minutes within 7 day(s). Physical Therapy Goals Initiated 4/17/2021 1. Patient will move from supine to sit and sit to supine  and scoot up and down in bed with modified independence within 7 day(s). 2.  Patient will transfer from bed to chair and chair to bed with modified independence using the least restrictive device within 7 day(s).  
3.  Patient will perform sit to stand with modified independence within 7 day(s). 4.  Patient will ambulate with modified independence for 150 feet with the least restrictive device within 7 day(s). Outcome: Progressing Towards Goal 
 
PHYSICAL THERAPY TREATMENT Patient: Patrick Gallagher (7 y.o. female) Date: 5/5/2021 Diagnosis: Dyspnea [R06.00] <principal problem not specified> Procedure(s) (LRB): 
RIGHT HEART CATH (N/A) 15 Days Post-Op Precautions: (LVAD) Chart, physical therapy assessment, plan of care and goals were reviewed. ASSESSMENT Patient continues with skilled PT services and is progressing towards goals. Pt noted with \"jerky\" movement in LE. Decrease stability deferred longer gait for safety. Pt required increase assistance with sit to stand with decrease anterior weightshift. Pt did improve with arm rest. Pt was able to perform repetitive sit to stands. Pt was able to problem solve change over to batteries. Pt  had decrease sequencing with power leads to batteries but able to problem solve for correct change over and secure to self. Current Level of Function Impacting Discharge (mobility/balance): Ax2 Other factors to consider for discharge: LE with jerky movement today PLAN : 
Patient continues to benefit from skilled intervention to address the above impairments. Continue treatment per established plan of care. to address goals. Recommendation for discharge: (in order for the patient to meet his/her long term goals) Therapy 3 hours per day 5-7 days per week This discharge recommendation: 
Has not yet been discussed the attending provider and/or case management IF patient discharges home will need the following DME: patient owns DME required for discharge SUBJECTIVE:  
Patient stated Dianna Campbell you say boss.  OBJECTIVE DATA SUMMARY:  
Critical Behavior: 
Neurologic State: Alert, Appropriate for age Orientation Level: Oriented X4 Cognition: Appropriate decision making, Appropriate for age attention/concentration, Appropriate safety awareness, Follows commands, Recognition of people/places Safety/Judgement: Decreased insight into deficits, Decreased awareness of need for safety Functional Mobility Training: 
Bed Mobility: 
  
Supine to Sit: Moderate assistance; Additional time Transfers: 
Sit to Stand: (varriable assistance min to mod A x2 8 trials ) Stand to Sit: Minimum assistance Balance: 
Sitting: Impaired Sitting - Static: Good (unsupported) Sitting - Dynamic: Good (unsupported) Standing: Impaired Standing - Static: Fair Standing - Dynamic : Fair Ambulation/Gait Training: 
Distance (ft): 5 Feet (ft) Assistive Device: Gait belt;Walker, rollator Ambulation - Level of Assistance: Contact guard assistance;Minimal assistance;Assist x2 Gait Abnormalities: Decreased step clearance Base of Support: Widened Step Length: Left shortened;Right shortened Stairs: Therapeutic Exercises:  
Pt demonstrated LE exercises reports performing when in chair. Good recall Pain Rating: No complaints Activity Tolerance:  
Limited After treatment patient left in no apparent distress:  
Sitting in chair and Call bell within reach COMMUNICATION/COLLABORATION:  
The patients plan of care was discussed with: Registered nurse. YENIFER Zavaleta PTA Time Calculation: 30 mins

## 2021-05-05 NOTE — PROGRESS NOTES
[address] [place] [address] in [place], [place] Procedure: Permacath Placement Pre Procedure: 9549 [id number]. Flow: 5.2 MAP: 82 
PI: 5.6 Power: 5.8 Fixed Speed: 9400 Low Speed: 9000 Post Procedure: 4688 Speed: 9400 Flow: 5.7 MAP: 78 
PI: 5.6 Power: 6.0 VAD Coordinator managed LVAD equipment during procedure.

## 2021-05-05 NOTE — ROUTINE PROCESS
TRANSFER - OUT REPORT: 
 
Verbal report given to Yossi(name) on Estella Oneill  being transferred to [facility](unit) for routine progression of care Report consisted of patients Situation, Background, Assessment and  
Recommendations(SBAR). Information from the following report(s) SBAR, Procedure Summary and MAR was reviewed with the receiving nurse. Lines:  
Quad Lumen RIJ 04/18/21 Right Internal jugular (Active) Central Line Being Utilized Yes 05/05/21 7846 Criteria for Appropriate Use Limited/no vessel suitable for conventional peripheral access 05/05/21 0804 Site Assessment Clean, dry, & intact 05/05/21 0804 Infiltration Assessment 0 05/05/21 0804 Affected Extremity/Extremities Color distal to insertion site pink (or appropriate for race) 05/05/21 4918 Date of Last Dressing Change 05/04/21 05/05/21 7720 Dressing Status Clean, dry, & intact 05/05/21 0804 Dressing Type Disk with Chlorhexadine gluconate (CHG); Transparent 05/05/21 0804 Action Taken Open ports on tubing capped 05/05/21 0804 Proximal Hub Color/Line Status White;Capped 05/05/21 0804 Positive Blood Return (Medial Site) Yes 05/05/21 0804 Medial 1 Hub Color/Line Status Blue;Capped 05/05/21 0804 Positive Blood Return (Lateral Site) Yes 05/05/21 0804 Medial 2 Hub Color/Line Status Gray;Capped 05/05/21 0804 Positive Blood Return (Site #3) Yes 05/05/21 8960 Distal Hub Color/Line Status Brown;Capped 05/05/21 0804 Positive Blood Return (Site #4) Yes 05/05/21 1412 Alcohol Cap Used Yes 05/05/21 0804 Opportunity for questions and clarification was provided. Patient transported with: 
 Registered Nurse, LVAD coordinator, Nadja Montejo with LVAD mo nitor and pt. EKG monitor

## 2021-05-05 NOTE — PROGRESS NOTES
Problem: Falls - Risk of 
Goal: *Absence of Falls Description: Document Magaly Ortez Fall Risk and appropriate interventions in the flowsheet. Outcome: Progressing Towards Goal 
Note: Fall Risk Interventions: 
Mobility Interventions: Communicate number of staff needed for ambulation/transfer, Patient to call before getting OOB Medication Interventions: Patient to call before getting OOB, Teach patient to arise slowly Elimination Interventions: Call light in reach, Stay With Me (per policy), Patient to call for help with toileting needs History of Falls Interventions: Room close to nurse's station, Door open when patient unattended(No bed alarm per wound care) Problem: Patient Education: Go to Patient Education Activity Goal: Patient/Family Education Outcome: Progressing Towards Goal 
  
Problem: Pressure Injury - Risk of 
Goal: *Prevention of pressure injury Description: Document Jaime Scale and appropriate interventions in the flowsheet. Outcome: Progressing Towards Goal 
Note: Pressure Injury Interventions: 
Sensory Interventions: Avoid rigorous massage over bony prominences, Keep linens dry and wrinkle-free, Maintain/enhance activity level Moisture Interventions: Absorbent underpads, Apply protective barrier, creams and emollients Activity Interventions: Increase time out of bed, Pressure redistribution bed/mattress(bed type) Mobility Interventions: HOB 30 degrees or less, Pressure redistribution bed/mattress (bed type) Nutrition Interventions: Document food/fluid/supplement intake Friction and Shear Interventions: HOB 30 degrees or less, Foam dressings/transparent film/skin sealants Problem: Patient Education: Go to Patient Education Activity Goal: Patient/Family Education Outcome: Progressing Towards Goal 
  
Problem: Heart Failure: Day 5 Goal: Activity/Safety Outcome: Progressing Towards Goal 
Goal: Diagnostic Test/Procedures Outcome: Progressing Towards Goal 
Goal: Nutrition/Diet Outcome: Progressing Towards Goal 
Goal: Discharge Planning Outcome: Progressing Towards Goal 
Goal: Medications Outcome: Progressing Towards Goal 
Goal: Respiratory Outcome: Progressing Towards Goal 
Goal: Treatments/Interventions/Procedures Outcome: Progressing Towards Goal 
Goal: Psychosocial 
Outcome: Progressing Towards Goal

## 2021-05-05 NOTE — PROGRESS NOTES
Pharmacist Note - Warfarin Dosing  Consult provided for this 78 y. o.female to manage warfarin for LVAD    INR Goal: 1.8-2.5 per NP (lowered due to GIB)    Home regimen/ tablet size: 4 mg daily     Drugs that may increase INR: None  Drugs that may decrease INR: None  Other current anticoagulants/ drugs that may increase bleeding risk: None  Risk factors: Age > 65  Daily INR ordered: YES    Recent Labs     05/05/21  0342 05/04/21  0119 05/03/21  0243   HGB 9.1* 9.3* 8.7*   INR 2.3* 3.6* 3.9*     Date               INR                  Dose  4/16  1.6  4 mg    4/17  1.8  4 mg   4/18  1.8  4 mg   4/19  1.8  4 mg   4/20                 2.0                  4 mg  4/21                 2.1                  4 mg  4/22                 2.2                  4 mg  4/23                 2.6                  2.5 mg  4/24  2.7  2.5 mg  4/25  2.5  2.5 mg  4/26                1.8                   4 mg    4/27                1.8                   4 mg   4/28  2.1  3 mg  4/29  2.3  3 mg  4/30                2.3                   3 mg     5/1                  2.5                   2.5 mg     5/2                  3.0                   --   5/3  3.9  Hold  5/4  3.6  Hold  5/5                  2.3                   2 mg                                                                                                                                                    Assessment/ Plan: Will order warfarin 2 mg  for today    Pharmacy will continue to monitor daily and adjust therapy as indicated. Please contact the pharmacist at x [phone] for outpatient recommendations if needed.

## 2021-05-05 NOTE — PROGRESS NOTES
[address] [facility] Adult  Hospitalist Group Hospitalist Progress Note Elizabeth Juarez MD 
Answering service: [phone] OR [phone] from in house phone NAME:  West Champagne :  1950 MRN:  [id number] Admission Summary:  
West Champagne is a 78 y.o. female with a past medical history of heart failure s/p LVAD, COPD, CKD stage 3, pulmonary HTN, Depression, HTN, and HLD who presented to [facility] appointment with complaints of chest tightness and increasing dyspnea. She was instructed to come to [facility] for admission for further treatment. At time of examination patient experiencing dyspnea but states she's feeling 100% better than what she felt yesterday. Interval history / Subjective:  
Patient seen and examined. Feels well, no acute events. Working with therapy. sahu out today, plans for PemCath and HD later today. Assessment & Plan: #. Acute on Chronic systolic congestive heart failure , s/p LVAD #. Chronic respiratory failure: on trilogy at home. Pt ok to use Bipap while in rehab instead of trilogy 
- NYHA class IV ;  s/p LVAD heartMate 2 , increased speed per HF team  
- Advanced heart failure team following and adjusting meds. AC with warfarin  
- RHC-  to measure heart pressures CKD stage 3 - advanced to ESRD- now on HD. Nephrology following - permCath on hold as INR supratherapeutic. COPD-  Stopped nebs- resume home inhalers- symbicort and albuterol Severe tremors- unclear etiology- stopped nebs. Improved after blood transfusion and dialysis,  
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  
JED -  home trilogy, BiPAP inpatient if patient able to tolerate this Depression - cont Paroxetine H/o breast cancer (1992) - s/p bilateral mastectomy/chemo and Endometrial ca s/p hysterectomy Pancytopenia- stable- Etiology unknown - Hematology evaluated- transfused RBCs Skin rash: arms, hydrocortisone topical cream. 
 
Code status: full DVT prophylaxis: warfarin- therapeutic INR Care Plan discussed with: Patient/Family Dispo: - looking for rehab- maybe near [facility] that can manage LVAD, trilogy, and HD Hospital Problems  Date Reviewed: 4/25/2021 Codes Class Noted POA Dyspnea ICD-10-CM: R06.00 
ICD-9-CM: 786.09  4/16/2021 Yes Co[facility] ICD-10-CM: P47.4 ICD-9-CM: 286.9  11/10/2020 Yes Open wound of left lower leg ICD-10-CM: B41.895O ICD-9-CM: 891.0  11/10/2020 Yes Anemia ICD-10-CM: D64.9 ICD-9-CM: 285.9  11/9/2020 Yes NICM (nonischemic cardiomyopathy) ([facility] ICD-10-CM: I42.8 ICD-9-CM: 425.4  11/4/2020 Yes Coronary artery disease involving native coronary artery of native heart without angina pectoris ICD-10-CM: I25.10 ICD-9-CM: 414.01  11/4/2020 Yes Chronic venous insufficiency ICD-10-CM: I87.2 ICD-9-CM: 459.81  11/4/2020 Yes Acute on chronic systolic CHF (congestive heart failure) (HCC) ICD-10-CM: N59.07 ICD-9-CM: 428.23, 428.0  10/27/2020 Yes Obesity, morbid ([facility] ICD-10-CM: E66.01 
ICD-9-CM: 278.01  10/1/2020 Yes Review of Systems: A comprehensive review of systems was negative except for that written in the HPI. Vital Signs:  
 Last 24hrs VS reviewed since prior progress note. Most recent are: 
Visit Vitals BP (!) 80/0 Pulse 79 Temp 98.1 °F (36.7 °C) Resp 19 Ht 5' 7\" (1.702 m) Wt 113.5 kg (250 lb 3.6 oz) SpO2 92% BMI 39.19 kg/m² Patient Vitals for the past 24 hrs: 
 Temp Pulse Resp SpO2  
05/05/21 0804 98.1 °F (36.7 °C) 79 19 92 % 05/05/21 0320 97.9 °F (36.6 °C) 85 20 91 % 05/04/21 2327 98.3 °F (36.8 °C) 96 24 92 % 05/04/21 1927 98.2 °F (36.8 °C) 83 22 94 % 05/04/21 1508 97.7 °F (36.5 °C) 94 22 94 % 05/04/21 1236 97.7 °F (36.5 °C) 85 18 95 % Intake/Output Summary (Last 24 hours) at 5/5/2021 1036 Last data filed at 5/5/2021 0284 Gross per 24 hour Intake 780 ml Output 650 ml Net 130 ml Physical Examination:  
     
Constitutional: NAD, awake and alert , obese, [employer]   
   
Resp: Decreased breath sounds, no wheezing, no creps. No accessory muscle use CV:  audible LVAD motor GI:  Soft, mild distended, non tender. BS +, no hepatosplenomegaly Musculoskeletal:  mild-mod edema, warm, wounds on bilat LE Neurologic:  Moves all extremities. AAOx3, diffuse mild tremors in upper and lower extremities. Data Review:  
 Review and/or order of clinical lab test 
XR CHEST PORT 
  
INDICATION: Possible aspiration 
  
COMPARISON: 4/26/2021 at 1406 hours 
  
TECHNIQUE: Portable AP upright chest view at 0950 hours 
  
FINDINGS: The support devices are stable. There is stable cardiac silhouette 
enlargement. The pulmonary vasculature is within normal limits.  
  
The lungs and pleural spaces are clear. There is no pneumothorax. The bones and 
upper abdomen are stable. 
  
IMPRESSION 
  
Clear lungs. Stable cardiac silhouette enlargement. 
  
 
Labs:  
 
Recent Labs 05/05/21 
0342 05/04/21 
7418 WBC 3.2* 3.0* HGB 9.1* 9.3* HCT 31.9* 31.5*  169 Recent Labs 05/05/21 
9932 05/04/21 
0119 05/03/21 
3730  137 136  
K 4.3 4.1 4.2  104 103 CO2 26 28 26 BUN 34* 23* 39* CREA 3.47* 2.50* 3.51* * 83 92 CA 9.1 9.0 9.0 PHOS 4.6 3.4 4.2 Recent Labs 05/05/21 
8312 05/04/21 
0119 05/03/21 
7335 ALT 10* 10* 9* AP 52 56 53 TBILI 0.6 0.6 0.6 TP 7.6 7.8 8.0 ALB 3.4* 3.2* 3.4*  
GLOB 4.2* 4.6* 4.6* Recent Labs   05/05/21 2485 05/04/21 
0119 05/03/21 
8921 INR 2.3* 3.6* 3.9* PTP 23.5* 35.6* 38.7* No results for input(s): FE, TIBC, PSAT, FERR in the last 72 hours. Lab Results Component Value Date/Time Folate 10.0 10/28/2020 03:56 AM  
  
No results for input(s): PH, PCO2, PO2 in the last 72 hours. No results for input(s): CPK, CKNDX, TROIQ in the last 72 hours. No lab exists for component: CPKMB Lab Results Component Value Date/Time Cholesterol, total 129 04/16/2021 10:40 AM  
 HDL Cholesterol 33 04/16/2021 10:40 AM  
 LDL, calculated 74 04/16/2021 10:40 AM  
 Triglyceride 110 04/16/2021 10:40 AM  
 CHOL/HDL Ratio 3.9 04/16/2021 10:40 AM  
 
Lab Results Component Value Date/Time Glucose (POC) 109 (H) 05/05/2021 06:46 AM  
 Glucose (POC) 120 (H) 05/04/2021 09:07 PM  
 Glucose (POC) 130 (H) 05/04/2021 04:34 PM  
 Glucose (POC) 168 (H) 05/04/2021 11:07 AM  
 Glucose (POC) 83 05/04/2021 08:49 AM  
 
Lab Results Component Value Date/Time Color YELLOW/STRAW 04/18/2021 05:08 PM  
 Appearance CLEAR 04/18/2021 05:08 PM  
 Specific gravity 1.016 04/18/2021 05:08 PM  
 Specific gravity 1.013 11/09/2020 12:05 AM  
 pH (UA) 6.5 04/18/2021 05:08 PM  
 Protein 30 (A) 04/18/2021 05:08 PM  
 Glucose Negative 04/18/2021 05:08 PM  
 Ketone Negative 04/18/2021 05:08 PM  
 Bilirubin Negative 04/18/2021 05:08 PM  
 Urobilinogen 0.2 04/18/2021 05:08 PM  
 Nitrites Negative 04/18/2021 05:08 PM  
 Leukocyte Esterase SMALL (A) 04/18/2021 05:08 PM  
 Epithelial cells FEW 04/18/2021 05:08 PM  
 Bacteria Negative 04/18/2021 05:08 PM  
 WBC 0-4 04/18/2021 05:08 PM  
 RBC 0-5 04/18/2021 05:08 PM  
 
 
 
Medications Reviewed:  
 
Current Facility-Administered Medications Medication Dose Route Frequency  warfarin (COUMADIN) tablet 2 mg  2 mg Oral ONCE  
 insulin lispro (HUMALOG) injection   SubCUTAneous AC&HS  
 dextrose (D50W) injection syrg 12.5-25 g  12.5-25 g IntraVENous PRN  
 hydrocortisone (CORTAID) 1 % cream Topical BID  insulin glargine (LANTUS) injection 12 Units  12 Units SubCUTAneous QHS  bumetanide (BUMEX) tablet 2 mg  2 mg Oral TID  mineral oil (FLEET) enema   Rectal PRN  
 bisacodyL (DULCOLAX) suppository 10 mg  10 mg Rectal DAILY PRN  
 benzocaine-zinc cl-benzalkonium cl (ORAJEL) 20-0.1-0.02 % mucosal gel   Oral PRN  
 heparin (porcine) 1,000 unit/mL injection 1,100 Units  1,100 Units InterCATHeter DIALYSIS PRN And  
 heparin (porcine) 1,000 unit/mL injection 1,400 Units  1,400 Units InterCATHeter DIALYSIS PRN  
 glucose chewable tablet 16 g  4 Tab Oral PRN  
 glucagon (GLUCAGEN) injection 1 mg  1 mg IntraMUSCular PRN  
 0.9% sodium chloride infusion 250 mL  250 mL IntraVENous PRN  
 budesonide-formoteroL (SYMBICORT) 160-4.5 mcg/actuation HFA inhaler 1 Puff (Patient Supplied)  1 Puff Inhalation BID RT  
 potassium chloride SR (KLOR-CON 10) tablet 20 mEq  20 mEq Oral DAILY  albuterol (PROVENTIL HFA, VENTOLIN HFA, PROAIR HFA) inhaler 2 Puff (Patient Supplied)  2 Puff Inhalation Q4H PRN  
 epoetin amalia-epbx (RETACRIT) injection 20,000 Units  20,000 Units SubCUTAneous Q MON, WED & FRI  
 oxymetazoline (AFRIN) 0.05 % nasal spray 2 Spray  2 Spray Both Nostrils BID PRN  
 sodium chloride (OCEAN) 0.65 % nasal squeeze bottle 2 Spray  2 Spray Both Nostrils Q2H PRN  therapeutic multivitamin (THERAGRAN) tablet 1 Tab  1 Tab Oral DAILY  isosorbide mononitrate ER (IMDUR) tablet 30 mg  30 mg Oral DAILY  cephALEXin (KEFLEX) capsule 250 mg  250 mg Oral BID  sodium chloride (NS) flush 5-40 mL  5-40 mL IntraVENous Q8H  
 sodium chloride (NS) flush 5-40 mL  5-40 mL IntraVENous PRN  
 acetaminophen (TYLENOL) tablet 650 mg  650 mg Oral Q4H PRN  
 hydrALAZINE (APRESOLINE) 20 mg/mL injection 10 mg  10 mg IntraVENous Q4H PRN  
 hydrALAZINE (APRESOLINE) tablet 100 mg  100 mg Oral TID  hydrOXYzine HCL (ATARAX) tablet 10 mg  10 mg Oral TID PRN  
 PARoxetine (PAXIL) tablet 20 mg  20 mg Oral DAILY  ranolazine ER (RANEXA) tablet 1,000 mg  1,000 mg Oral BID  tafamidis cap 61 mg (Patient Supplied)  61 mg Oral DAILY  warfarin dosing per pharmacy   Other Rx Dosing/Monitoring  miconazole (MICOTIN) 2 % powder   Topical BID  
 
______________________________________________________________________ EXPECTED LENGTH OF STAY: 5d 7h 
ACTUAL LENGTH OF STAY:          Lina Dodd MD

## 2021-05-05 NOTE — PROGRESS NOTES
[address] [facility] [address] in [place], [place] Inpatient Consult Progress Note Patient name: Lilly Selby Patient : 1950 Patient MRN: [id number] Consulting MD: Eboni Coreas MD 
Date of service: 21 CHIEF COMPLAINT: 
Dyspnea 
  
PLAN: 
· Continue HD for volume management; per Nephrology- will need to determine longevity of HD therapy and plan for discharge · Plan for Permacath placement tomorrow; hold warfarin tonight · ID consult for pancytopenia and persistent sed rate appreciated; ?alternative abx agent - recommend against stopping antibiotics altogether due to chronic SWI/suspected pump infection and high risk of clinical deterioration if infection is not suppressed ? Palliative Care Consult to discuss goals and limits of care; patient and daughter currently wish to continue full measures including HD  
  
Continue decreased device speed 9400rpm due to increased ectopy TTE prior to D/C for comparison of baseline Continue hydralazine 100 mg po TID and imdur 30 mg PO daily Continue tafamidis 61 mg PO daily Intolerant to BB/ACEi/ARB/ARNI due to aTTR Intolerant to MRA due to hyperkalemia Volume management per Nephrology; strict I/O, standing weights Appreciate SLP consult and recommendations Coumadin dose per pharmacy; goal INR lowered to 1.8-2.5 due to GIB S/p Permacath placement  Continue ranolazine, no ectopy - followed by Dr. Angel Ortega OP Antibiotics for chronic sternal wound infection per ID - continue Keflex po 
Repeat iron/ferritin remain low, s/p Venofer 200mg daily TSH 3.99; Synthroid on hold due to tremors- repeat before discharge   
Use home Trilogy when napping and QHS PFTs when euvolemic  
Continue Miralax daily PT/OT Palliative care consult appreciated; patient remains full code DM management per Hospitalist  
Up to date on flu, PNA, and COVID vaccinations Plan for tentative d/c to [facility] on Thursday and transfer to daughter, Melanie reyna to receive HD at [facility] facility as an OP 
  
IMPRESSION: 
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 Rere Barcenas) · C/b delayed extubation due to severe COPD 
· C/b critical illness polyneuropathy · C/b prolonged hospitalization post-LVAD, 55 days · C/b sternal wound infection s/p debridement (by Dr. Selin Lopez) s/p wound vac · C/b sacral ulcer · Would culture positive for Staph aureus, not MRSA · C/b LVAD site drainage, improved S/p CRT-ICD · ICD fired due to electrolyte imbalance (2011) H/o breast cancer (1992) · s/p bilateral mastectomy/chemo and endometrial cancer s/p hysterectomy · Lymphedema of LLE due to cancer treatment Severe COPD with FEV1 50% Depression Atrial fibrillation H/o \"two mini strokes\" S/p fall with hip facture · Right hip hemmiarthroplasty (5/23/18) by Dr. Alvina Bowles) · S/p removed hip hardwarare due to pain (4/15/19) COPD severe CKD, stage 4 Hyperkalemia, resolved Pulmonary hypertension Cardiac risk factors: · Morbid obesity, Body mass index is 42.29 kg/m². · DM2 insulin dependent · JED on Trilogy · HL Urinary incontinence, severe · no procedures due to anticoagulation Endometrial cancer (2002) HTN 
HL 
  
CARDIAC IMAGING: 
TTE 4/19 shows improved LVIDd, 6.68 cm with RVIDd 5.14 cm and TAPSE 1.1 cm  
TTE 4/28/21 LVIDd 7.37cm, RVIDd 5.27cm, TAPSE 1.44cm Echo (9/24/19) LVEF 20-25%, AV opens 1:1, no AR Echo (5/29/18) LVEF 10-%, ramps study done, report in [facility] Echo (1/9/18) ramp study done, LVEDD 7.1cm [facility] (11/9/18) 2 vessel disease with 90% OM, 80% PDA, DSA to PDA branch [address] 4/20 showed adequate Sal CI 3.0 but severely elevated RA pressure 24 mmHg CXR negative 4/27/21 
  
INTERVAL HISTORY: 
Feels well today, working with PT/OT  
INR 2.3 Pro-BNP 8751 from 10,087 Ammonia 30 LVAD INTERROGATION: 
Device interrogated in person Device function normal, normal flow, no events LVAD Pump Speed (RPM): 9400 Pump Flow (LPM): 5.7 MAP: 84 
PI (Pulsitility Index): 4 Power: 6.1  Test: No 
Back Up  at Bedside & Labeled: Yes Power Module Test: No 
Driveline Site Care: No 
Driveline Dressing: Clean, Dry, and Intact Outpatient: No 
MAP in Therapeutic Range (Outpatient): Yes Testing Alarms Reviewed: Yes 
Back up SC speed: 9400 Back up Low Speed Limit: 9000 Emergency Equipment with Patient?: Yes Emergency procedures reviewed?: No 
Drive line site inspected?: Yes Drive line intergrity inspected?: Yes Drive line dressing changed?: Yes PHYSICAL EXAM: 
Visit Vitals BP (!) 80/0 Pulse 86 Temp 98.6 °F (37 °C) Resp 24 Ht 5' 7\" (1.702 m) Wt 250 lb 3.6 oz (113.5 kg) SpO2 99% BMI 39.19 kg/m² General: Patient is well developed, well-nourished in no acute distress HEENT: Normocephalic and atraumatic. No scleral icterus. Pupils are equal, round and reactive to light and accomodation. No conjunctival injection. Oropharynx is clear. Neck: Supple. No evidence of thyroid enlargements or lymphadenopathy. JVD: Cannot be appreciated Lungs: Breath sounds are equal and clear bilaterally. No wheezes, rhonchi, or rales. Heart: Regular rate and rhythm with normal S1 and S2. No murmurs, gallops or rubs. Abdomen: Soft, no mass or tenderness. No organomegaly or hernia. Bowel sounds present. Genitourinary and rectal: deferred Extremities: No cyanosis, clubbing, or edema. Neurologic: No focal sensory or motor deficits are noted. Grossly intact. Psychiatric: Awake, alert an doriented x 3. Appropriate mood and affect. Skin: Warm, dry and well perfused. No lesions, nodules or rashes are noted. REVIEW OF SYSTEMS: 
General: Denies fever, night sweats. Ear, nose and throat: Denies difficulty hearing, sinus problems, runny nose, post-nasal drip, ringing in ears, mouth sores, loose teeth, ear pain, nosebleeds, sore throate, facial pain or numbess Cardiovascular: see above in the interval history Respiratory: Denies cough, wheezing, sputum production, hemoptysis. Gastrointestinal: Denies heartburn, constipation, intolerance to certain foods, diarrhea, abdominal pain, nausea, vomiting, difficulty swallowing, blood in stool Kidney and bladder: Denies painful urination, frequent urination. Musculoskeletal: Denies joint pain, muscle weakness Skin and hair: Denies change in existing skin lesions, hair loss or increase, breast changes PAST MEDICAL HISTORY: 
Past Medical History:  
Diagnosis Date  Asthma  Cancer ([facility] breast  
 Cancer ([facility]   
 endometrial  
 Congestive heart failure, unspecified  CRI (chronic renal insufficiency)  Depression  Diabetes ([facility]  Hypertension PAST SURGICAL HISTORY: 
Past Surgical History:  
Procedure Laterality Date  HX HERNIA REPAIR    
 HX HYSTERECTOMY  HX MASTECTOMY  IR INSERT NON TUNL CVC OVER 5 YRS  4/26/2021  IR INSERT TUNL CVC W/O PORT OVER 5 YR  5/5/2021 FAMILY HISTORY: 
No family history on file. SOCIAL HISTORY: 
Social History Socioeconomic History  Marital status:  Spouse name: Not on file  Number of children: Not on file  Years of education: Not on file  Highest education level: Not on file Tobacco Use  Smoking status: Never Smoker  Smokeless tobacco: Never Used Substance and Sexual Activity  Alcohol use: Not Currently LABORATORY RESULTS: 
  
Labs Latest Ref Rng & Units 5/5/2021 5/4/2021 5/3/2021 5/2/2021 5/1/2021 4/30/2021 4/29/2021 WBC 3.6 - 11.0 K/uL 3.2(L) 3.0(L) 3.4(L) 3. 2(L) 3.5(L) 3. 2(L) -  
RBC 3.80 - 5.20 M/uL 3.48(L) 3.49(L) 3.37(L) 3.24(L) 3.32(L) 3.24(L) - Hemoglobin 11.5 - 16.0 g/dL 9.1(L) 9.3(L) 8.7(L) 8.5(L) 8.7(L) 8.4(L) - Hematocrit 35.0 - 47.0 % 31. 9(L) 31. 5(L) 30. 2(L) 29. 2(L) 30. 1(L) 29. 5(L) -  
MCV 80.0 - 99.0 FL 91.7 90.3 89.6 90.1 90.7 91.0 - Platelets 564 - 419 K/uL 179 169 160 145(L) 163 155 - Lymphocytes 12 - 49 % 17 24 13 13 11(L) 17 - Monocytes 5 - 13 % 11 13 10 10 9 13 - Eosinophils 0 - 7 % 0 0 0 0 0 0 - Basophils 0 - 1 % 1 0 0 1 1 1 -  
Bands 0 - 6 % - - - - 8(H) - - Albumin 3.5 - 5.0 g/dL 3.4(L) 3. 2(L) 3.4(L) 3.4(L) 3.4(L) 3. 3(L) -  
Calcium 8.5 - 10.1 MG/DL 9.1 9.0 9.0 9.0 9.0 8.9 - Glucose 65 - 100 mg/dL 107(H) 83 92 109(H) 100 84 -  
BUN 6 - 20 MG/DL 34(H) 23(H) 39(H) 33(H) 22(H) 31(H) -  
Creatinine 0.55 - 1.02 MG/DL 3.47(H) 2.50(H) 3.51(H) 3.16(H) 2.52(H) 2.88(H) - Sodium 136 - 145 mmol/L 138 137 136 137 138 139 - Potassium 3.5 - 5.1 mmol/L 4.3 4.1 4.2 4.4 4.4 4.2 4.3 TSH 0.36 - 3.74 uIU/mL - - - - - - -  
LDH 81 - 246 U/L 215 204 189 194 178 176 - Some recent data might be hidden Lab Results Component Value Date/Time TSH 5.36 (H) 04/24/2021 04:32 AM  
 TSH 3.99 (H) 04/17/2021 04:54 AM  
 TSH 2.980 10/01/2020 12:00 AM  
 
 
ALLERGY: 
Allergies Allergen Reactions  Benzodiazepines Other (comments) Respiratory issues CURRENT MEDICATIONS: 
 
Current Facility-Administered Medications:  
  warfarin (COUMADIN) tablet 2 mg, 2 mg, Oral, ONCE, Festus Ribeiro MD 
  0.9% sodium chloride infusion, 50 mL/hr, IntraVENous, CONTINUOUS, Sharita Bradshaw MD, Last Rate: 50 mL/hr at 05/05/21 1300, 50 mL/hr at 05/05/21 1300 
  insulin lispro (HUMALOG) injection, , SubCUTAneous, AC&HS, Festus Ribeiro MD, Stopped at 05/04/21 1630 
  dextrose (D50W) injection syrg 12.5-25 g, 12.5-25 g, IntraVENous, PRN, Festus Ribeiro MD 
  hydrocortisone (CORTAID) 1 % cream, , Topical, BID, Festus Ribeiro MD, Given at 05/05/21 0900 
  insulin glargine (LANTUS) injection 12 Units, 12 Units, SubCUTAneous, QHS, Festus Ribeiro MD, 12 Units at 05/04/21 2127   bumetanide (BUMEX) tablet 2 mg, 2 mg, Oral, TID, Odette Jones MD, Stopped at 05/05/21 0900 
  mineral oil (FLEET) enema, , Rectal, PRN, Festus Ribeiro MD 
  bisacodyL (DULCOLAX) suppository 10 mg, 10 mg, Rectal, DAILY PRN, Triny Limon NP, 10 mg at 04/28/21 1950   benzocaine-zinc cl-benzalkonium cl (ORAJEL) 20-0.1-0.02 % mucosal gel, , Oral, PRN, Macie Hill MD, Given at 05/04/21 1004   heparin (porcine) 1,000 unit/mL injection 1,100 Units, 1,100 Units, InterCATHeter, DIALYSIS PRN, 1,100 Units at 05/03/21 1004 **AND** heparin (porcine) 1,000 unit/mL injection 1,400 Units, 1,400 Units, InterCATHeter, DIALYSIS PRN, Leighann Zeng MD, 1,400 Units at 05/03/21 1004   glucose chewable tablet 16 g, 4 Tab, Oral, PRN, Macie Hill MD 
  glucagon [facility]) injection 1 mg, 1 mg, IntraMUSCular, PRN, Macie Hill MD 
  0.9% sodium chloride infusion 250 mL, 250 mL, IntraVENous, PRN, Wes Zamora NP 
  budesonide-formoteroL (SYMBICORT) 160-4.5 mcg/actuation HFA inhaler 1 Puff (Patient Supplied), 1 Puff, Inhalation, BID RT, Macie Hill MD, 1 Puff at 05/05/21 9005   potassium chloride SR (KLOR-CON 10) tablet 20 mEq, 20 mEq, Oral, DAILY, PollAnita torres NP, 20 mEq at 05/05/21 3401   albuterol (PROVENTIL HFA, VENTOLIN HFA, PROAIR HFA) inhaler 2 Puff (Patient Supplied), 2 Puff, Inhalation, Q4H PRN, Macie Hill MD 
  epoetin amalia-epbx (RETACRIT) injection 20,000 Units, 20,000 Units, SubCUTAneous, Q MON, WED & FRI, Jaspreet Mendoza MD, 20,000 Units at 05/03/21 2130 
  oxymetazoline (AFRIN) 0.05 % nasal spray 2 Spray, 2 Spray, Both Nostrils, BID PRN, Magda Walter NP, 2 Spray at 04/22/21 1800 
  sodium chloride (OCEAN) 0.65 % nasal squeeze bottle 2 Spray, 2 Spray, Both Nostrils, Q2H PRN, Ynes Walter NP 
  therapeutic multivitamin (THERAGRAN) tablet 1 Tab, 1 Tab, Oral, DAILY, Ynes Walter NP, 1 Tab at 05/05/21 6961   isosorbide mononitrate ER (IMDUR) tablet 30 mg, 30 mg, Oral, DAILY, Ynes Walter NP, Stopped at 05/05/21 0900   cephALEXin (KEFLEX) capsule 250 mg, 250 mg, Oral, BID, Cralos Waller MD, 250 mg at 05/05/21 7236   sodium chloride (NS) flush 5-40 mL, 5-40 mL, IntraVENous, Q8H, Dana Walter NP, 10 mL at 05/05/21 0649 
  sodium chloride (NS) flush 5-40 mL, 5-40 mL, IntraVENous, PRN, Ortega Walter NP, 10 mL at 04/22/21 1238   acetaminophen (TYLENOL) tablet 650 mg, 650 mg, Oral, Q4H PRN, Dana Walter NP, 650 mg at 05/05/21 1141   hydrALAZINE (APRESOLINE) 20 mg/mL injection 10 mg, 10 mg, IntraVENous, Q4H PRN, Ortega Walter NP, 10 mg at 04/21/21 2381   hydrALAZINE (APRESOLINE) tablet 100 mg, 100 mg, Oral, TID, Ynes Walter NP, Stopped at 05/05/21 0900   hydrOXYzine HCL (ATARAX) tablet 10 mg, 10 mg, Oral, TID PRN, Ynes Walter NP 
  PARoxetine (PAXIL) tablet 20 mg, 20 mg, Oral, DAILY, Ortega Walter NP, 20 mg at 05/05/21 2902   ranolazine ER (RANEXA) tablet 1,000 mg, 1,000 mg, Oral, BID, Ynes Walter NP, 1,000 mg at 05/05/21 0549   tafamidis cap 61 mg (Patient Supplied), 61 mg, Oral, DAILY, Ynes Walter NP, 61 mg at 05/05/21 2096   warfarin dosing per pharmacy, , Other, Rx Dosing/Monitoring, Ynes Walter NP 
  miconazole (MICOTIN) 2 % powder, , Topical, BID, Carlos Waller MD, Given at 05/05/21 4011 PATIENT CARE TEAM: 
Patient Care Team: 
He Hendrickson NP as PCP - General (Nurse Practitioner) Lzu Elena Rangel MD (Cardiology) Beverley Ruggiero MD as Physician (Cardiology) Thank you for allowing me to participate in this patient's care. TEA Brooke [id number] [facility] 
[address], [address] Phone: [phone] Fax: [phone] [facility] ATTENDING ADDENDUM Patient was seen and examined in person. Data and notes were reviewed. I have discussed and agree with the plan as noted in the NP note above without further additions. Maricarmen Gutierrez MD PhD 
Irene Skelton

## 2021-05-05 NOTE — PROGRESS NOTES
0730: Bedside shift change report given to 129 Rosa Sutton (oncoming nurse) by Maya Tellez (offgoing nurse). Report included the following information SBAR, Kardex, Intake/Output, MAR, Accordion, Recent Results and Cardiac Rhythm paced. 1000: Fletcher removed per order. 1230: Patient off unit with LVAD coordinator for permacath placement. 1430: Patient back on unit from IR. Permacath site partially saturated. Will continue to monitor. 1530: Permacath dressing changed d/t dressing saturated. Held pressure for 5 minutes. Will continue to monitor. 1700: Permacath dressing changed d/t dressing saturation. [facility] applied to site. Pressure held. 1845: Permacath dressing changed d/t dressing saturation. [facility] applied to site. Pressure held for 10 minutes. 1915: Patient voided 125cc via purwick. Previously had unmeasurable urine occurrence earlier in brief. 1930: Bedside shift change report given to Tim Greco RN (oncoming nurse) by Manpreet Sutton (offgoing nurse). Report included the following information SBAR, Kardex, Intake/Output, MAR, Accordion, Recent Results and Cardiac Rhythm paced.

## 2021-05-05 NOTE — PROGRESS NOTES
1930  Bedside shift change report given to Yovana Fregoso (oncoming nurse) by Rose Marie Mcclendon (offgoing nurse). Report included the following information SBAR, Kardex, Intake/Output, MAR, Accordion, Recent Results and Cardiac Rhythm Paced. 0730  Bedside shift change report given to Matt Johnson (oncoming nurse) by Yovana Fregoso (offgoing nurse). Report included the following information SBAR, Kardex, Intake/Output, MAR, Accordion, Recent Results and Cardiac Rhythm Paced.

## 2021-05-05 NOTE — PROGRESS NOTES
Pt arrives via stretcher to angio department accompanied by LVAD coordinator, Arnoldo Rolle, for Permcath procedure. All assessments completed and consent was reviewed. Education given was regarding procedure, Fentanyl sedation, post-procedure care and  management/follow-up. Opportunity for questions was provided and all questions and concerns were addressed.

## 2021-05-05 NOTE — PROGRESS NOTES
Problem: Self Care Deficits Care Plan (Adult) Goal: *Acute Goals and Plan of Care (Insert Text) Description: FUNCTIONAL STATUS PRIOR TO ADMISSION: Patient was modified independent using a rollator for functional mobility. Pt lives alone, was able to perform dressing with AE (reacher.) She does not wear socks and has slip on shoes with pre tied laces. Pt was sponge bathing at home due to B LE wounds. Pt independently managed her LVAD 
 
HOME SUPPORT PRIOR TO ADMISSION: The patient lived alone with daughter to provide assistance. Will be staying with daughter once discharged. Occupational Therapy Goals Initiated 4/19/2021; Goals reviewed 4/26/2021, goals downgraded due to myoclonic jerking/tremors impacting performance; OT weekly reassessment 5/4 
1. Patient will perform upper body dressing and lower body dressing using AE PRN with modified independence within 7 day(s). Downgrade to only UB dressing with min A; continue 5/4 
2. Patient will perform bathing with modified independence within 7 day(s). Downgrade to min A; Continue 5/4 
3. Patient will perform standing ADLs at sink with modified independence within 7 day(s). Downgrade to EOB with CGA, upgrade 5/4 to mod I 
4. Patient will perform toilet transfers in bathroom with least restrictive DME with modified independence within 7 day(s). Downgrade to [facility] t/f with min A, upgrade 5/4 to mod I 
5. Patient will perform all aspects of toileting with modified independence within 7 day(s). Downgrade to min A., continue 5/4 6. Patient will utilize energy conservation techniques during functional activities with verbal cues within 7 day(s). Continue 5/4 Outcome: Progressing Towards Goal 
 OCCUPATIONAL THERAPY TREATMENT Patient: Keshav Estrada (72 y.o. female) Date: 5/5/2021 Diagnosis: Dyspnea [R06.00] <principal problem not specified> Procedure(s) (LRB): 
RIGHT HEART CATH (N/A) 15 Days Post-Op Precautions: (LVAD) Chart, occupational therapy assessment, plan of care, and goals were reviewed. ASSESSMENT Patient continues with skilled OT services and is progressing towards goals. Patient received resting in bed, agreeable to session. This date, patient requiring overall mod assist for bed mobility. Patient demonstrating intact sitting balance at EOB, using reacher to doff B socks, thread BLEs through protective undergarment, and position slip-on shoes for donning. Patient requiring up to min assist for managing heel of shoe and mod assist for adjusting brief between knee and mid-thigh 2/2 increased myoclonic jerking and unsteadiness in standing. Patient agreeable to multiple trials sit<> prep for greater safety and independence with functional transfers - patient participatory in discussion regarding best hand placement and benefits of BSC frame over toilet for increased BUE support during toilet transfer. Continue to recommend IPR at discharge as patient is extremely motivated and continues to work towards mod I baseline. Current Level of Function Impacting Discharge (ADLs): up to mod assist for lower body tasks; assist x2 for transfers Other factors to consider for discharge: increased fall risk; mod I baseline PLAN : 
Patient continues to benefit from skilled intervention to address the above impairments. Continue treatment per established plan of care to address goals. Recommend with staff: OOB to chair and toileting at [facility] with rollator and assist x2 Recommend next OT session: bathroom mobility; dynamic balance Recommendation for discharge: (in order for the patient to meet his/her long term goals) Therapy 3 hours per day 5-7 days per week This discharge recommendation: 
Has been made in collaboration with the attending provider and/or case management IF patient discharges home will need the following DME: TBD SUBJECTIVE:  
Patient stated i'm just getting so frustrated with myself.  OBJECTIVE DATA SUMMARY:  
Cognitive/Behavioral Status: 
Neurologic State: Alert; Appropriate for age Orientation Level: Oriented X4 Cognition: Follows commands; Appropriate for age attention/concentration Perception: Appears intact Perseveration: No perseveration noted Safety/Judgement: Awareness of environment; Fall prevention;Decreased insight into deficits Functional Mobility and Transfers for ADLs: 
Bed Mobility: 
Supine to Sit: Moderate assistance; Additional time Scooting: Minimum assistance; Additional time Transfers: 
Sit to Stand: Minimum assistance; Moderate assistance;Assist x2 Stand to Sit: Minimum assistance;Assist x2 Bed to Chair: Minimum assistance;Contact guard assistance;Assist x2 Balance: 
Sitting: Impaired Sitting - Static: Good (unsupported) Sitting - Dynamic: Good (unsupported) Standing: Impaired Standing - Static: Fair Standing - Dynamic : Fair ADL Intervention: 
Upper Body Dressing Assistance Dressing Assistance: Stand-by assistance(donning LVAD calabrese w/ waist strap) Patient able to transfer LVAD over to battery power this session with increased time for task and material management. Patient problem-solving effectively. Lower Body Dressing Assistance Dressing Assistance: Minimum assistance Protective Undergarmet: Minimum assistance; Moderate assistance(reacher to thread; assist for knee to mid-thigh in stand) Socks: Stand-by assistance; Compensatory technique training(using reacher to doff) Slip on Shoes with Back: Minimum assistance; Compensatory technique training(reacher; for heels; no long-handled shoehorn available) Position Performed: Seated edge of bed Cues: Verbal cues provided;Visual cues provided;Physical assistance Adaptive Equipment Used: Reacher(rollator in standing) Cognitive Retraining Orientation Retraining: Awareness of environment Safety/Judgement: Awareness of environment; Fall prevention;Decreased insight into deficits Therapeutic Exercises: Tolerated multiple trials sit<>stand with min-mod assist x2 in prep for functional transfers - educated on benefits of BSC frame over toilet for handrail support to facilitate safe transfer. Pain: 
Patient tolerating session well. Activity Tolerance:  
Fair and requires rest breaks After treatment patient left in no apparent distress:  
Sitting in chair, Call bell within reach, and Bed / chair alarm activated COMMUNICATION/COLLABORATION:  
The patients plan of care was discussed with: Physical therapist and Registered nurse. Loki Ramos OT Time Calculation: 32 mins

## 2021-05-06 NOTE — PROCEDURES
[facility] Dialysis Team [place]  [phone] Vitals   Pre   Post   Assessment   Pre   Post    
Temp  Temp: 98.1 °F (36.7 °C) (05/06/21 0804)  98.0 LOC  A/0x4 No change HR   Pulse (Heart Rate): 89 (05/06/21 0804) 87 Lungs   Diminished bases  no change B/P   BP: (MAP 86) (05/03/21 1250) MAP 78 Cardiac   lvad in place  no change Resp   Resp Rate: 18 (05/06/21 0830) 18 Skin   Warm and dry  no change Pain level  Pain Intensity 1: 0 (05/06/21 0315) 0 Edema  Trace edema lower extr. No change Orders: Duration:   Start:   0830 End:   1136 Total:   3 Dialyzer:   Dialyzer/Set Up Inspection: Megha Moses (05/06/21 0830) K Bath:   Dialysate K (mEq/L): 3 (05/06/21 0830) Ca Bath:   Dialysate CA (mEq/L): 2.5 (05/06/21 0830) Na/Bicarb:   Dialysate NA (mEq/L): 140 (05/06/21 0830) Target Fluid Removal:   Goal/Amount of Fluid to Remove (mL): 2000 mL (05/06/21 0830) Access Type & Location:   Right tunneled CVC Labs Obtained/Reviewed Critical Results Called   Date when labs were drawn- 
Hgb-   
HGB Date Value Ref Range Status 05/06/2021 9.3 (L) 11.5 - 16.0 g/dL Final  
 
K-   
Potassium Date Value Ref Range Status 05/06/2021 4.6 3.5 - 5.1 mmol/L Final  
 
Ca-  
Calcium Date Value Ref Range Status 05/06/2021 9.0 8.5 - 10.1 MG/DL Final  
 
Bun-  
BUN Date Value Ref Range Status 05/06/2021 42 (H) 6 - 20 MG/DL Final  
 
Creat-  
Creatinine Date Value Ref Range Status 05/06/2021 3.91 (H) 0.55 - 1.02 MG/DL Final  
 
  
Medications/ Blood Products Given Name   Dose   Route and Time Heparin 1:1000 1.9ml & 1.9ml  Lock Blood Volume Processed (BVP):    66.8 Net Fluid Removed:  2000mls Comments Time Out Done: Yes Primary Nurse Rpt Pre: Jean Claude Kuo, RN 
Primary Nurse Rpt Post:Beatriz Szymanski, RN 
Pt Education:procedural, catheter care. Care Plan: Discharge today- TTS schedule at [facility] Summary: 
Dressing intact. Shadowing noted on gauze.  Per Primary RN, several dressing changes through out night due to bleeding at site. Will do dressing change. No s/s of infection. Both lumens aspirate & flush well. Running well at . SBAR received from Primary RN. Consent signed & on file. 0815: Each catheter limb disinfected per p&p, caps removed, hubs disinfected per p&p. Each lumen aspirated for blood return and flushed with Normal Saline per policy. Labs drawn per request/ order. VSS. Dialysis Tx initiated at 0830 MAPS WNL-pt alert. No symptoms. Tolerating UF well. 1130: Tx ended. VSS. Each dialysis catheter limb disinfected per p&p, all possible blood returned per p&p, and each dialysis hub disinfected per p&p. Each lumen flushed, post dialysis catheter Heparin dwell instilled per order, and caps applied. New dressing placed on patient, has some shadowing noted on gauze. Primary nurse states IR will be in to look at catheter since pt is being discharged today therefore  Will keep dressing as is. Bed locked and in the lowest position, call bell and belongings in reach. SBAR given to Primary, RN. Patient is stable at time of my departure. All Dialysis related medications have been reviewed. Admiting Diagnosis: 
Pt's previous clinic-new start on admission Consent signed - Informed Consent Verified: Yes (05/03/21 0950) Yulisa Consent - on file Hepatitis Status- neg 4/21/21 [facility] 4/27/21 Machine #- Machine Number: [id number] (05/06/21 0830) Telemetry status- bedside monitor Pre-dialysis wt. - Pre-Dialysis Weight: 115.4 kg (254 lb 6.6 oz) (04/30/21 0815)

## 2021-05-06 NOTE — PROGRESS NOTES
[facility] VAD/Heart Failure Progress Note Admit Date: 2021 POD:  16 Days Post-Op Procedure:  Procedure(s): RIGHT HEART CATH Subjective: Dyspnea improved; remains on HD' good flows Objective:  
Vitals: 
Blood pressure (!) 80/0, pulse 85, temperature 98.1 °F (36.7 °C), resp. rate 18, height 5' 7\" (1.702 m), weight 246 lb 11.1 oz (111.9 kg), SpO2 95 %. Temp (24hrs), Av °F (36.7 °C), Min:97.7 °F (36.5 °C), Max:98.1 °F (36.7 °C) Hemodynamics: 
 CO:   
 CI:   
 CVP:   
 SVR:   
 PAP Systolic:   
 PAP Diastolic:   
 PVR:   
 NN05:   
 SCV02:   
 
VAD Interrogation: LVAD (Heartmate) Pump Speed (RPM): 9400 Pump Flow (LPM): 5.3 Chatter in Lines: No 
PI (Pulsitility Index): 5.8 Power: 5.9 MAP: 84  Test: No 
Back Up  at Bedside & Labeled: Yes Power Module Test: No 
Driveline Site Care: No 
Driveline Dressing: Clean, Dry, and Intact EKG/Rhythm:   
 
Extubation Date / Time:  
 
CT Output:  
 
Ventilator: 
Ventilator Volumes Vt Spont (ml): 642 ml (21 0140) Ve Observed (l/min): 12.8 l/min (21 0140) Oxygen Therapy: 
Oxygen Therapy O2 Sat (%): 95 % (21 1245) Pulse via Oximetry: 81 beats per minute (21 2259) O2 Device: None (Room air) (21 1245) Skin Assessment: Clean, dry, & intact (21 0932) Skin Protection for O2 Device: No (21 025) O2 Flow Rate (L/min): 3 l/min (21 1400) FIO2 (%): 24 % (21 0140) CXR: 
 
Admission Weight: Last Weight Weight: 270 lb 6.4 oz (122.7 kg) Weight: 246 lb 11.1 oz (111.9 kg) Intake / Output / Drain: 
Current Shift:  0701 -  1900 In: 0 Out: 2150 [Urine:150] Last 24 hrs.:  
 
Intake/Output Summary (Last 24 hours) at 2021 1408 Last data filed at 2021 1245 Gross per 24 hour Intake 200 ml Output 2800 ml Net -2600 ml No results for input(s): CPK, CKMB, TROIQ in the last 72 hours. Recent Labs   21 7443 05/05/21 
9526 05/05/21 
0337 05/04/21 
7713   --  138 137  
K 4.6  --  4.3 4.1 CO2 24  --  26 28 BUN 42*  --  34* 23* CREA 3.91*  --  3.47* 2.50* *  --  107* 83 PHOS 5.8*  --  4.6 3.4 WBC 3.7 3.2*  --  3.0* HGB 9.3* 9.1*  --  9.3* HCT 31.1* 31.9*  --  31.5*  179  --  169 Recent Labs 05/06/21 
9574 05/05/21 
3092 05/04/21 
5251 INR 2.1* 2.3* 3.6* PTP 21.2* 23.5* 35.6* No lab exists for component: PBNP Current Facility-Administered Medications:  
  heparin (porcine) 1,000 unit/mL injection 1,900 Units, 1,900 Units, InterCATHeter, DIALYSIS PRN, 1,900 Units at 05/06/21 1057 **AND** heparin (porcine) 1,000 unit/mL injection 1,900 Units, 1,900 Units, InterCATHeter, DIALYSIS PRN, Hillary Holman MD, 1,900 Units at 05/06/21 1057   insulin lispro (HUMALOG) injection, , SubCUTAneous, AC&HS, Festus Ribeiro MD, Stopped at 05/04/21 1630 
  dextrose (D50W) injection syrg 12.5-25 g, 12.5-25 g, IntraVENous, PRN, Festus Ribeiro MD 
  hydrocortisone (CORTAID) 1 % cream, , Topical, BID, Festus Ribeiro MD, Given at 05/06/21 0809 
  insulin glargine (LANTUS) injection 12 Units, 12 Units, SubCUTAneous, QHS, Festus Ribeiro MD, 12 Units at 05/05/21 2219   bumetanide (BUMEX) tablet 2 mg, 2 mg, Oral, TID, Hillary Holman MD, 2 mg at 05/06/21 1239 
  mineral oil (FLEET) enema, , Rectal, PRN, Festus Ribeiro MD 
  benzocaine-zinc cl-benzalkonium cl (ORAJEL) 20-0.1-0.02 % mucosal gel, , Oral, PRN, Corinne Brook, MD, Given at 05/04/21 1004   glucose chewable tablet 16 g, 4 Tab, Oral, PRN, Corinne Brook, MD 
  glucagon [facility]) injection 1 mg, 1 mg, IntraMUSCular, PRN, Corinne Brook, MD 
  0.9% sodium chloride infusion 250 mL, 250 mL, IntraVENous, PRN, Thuy Smith NP 
  budesonide-formoteroL (SYMBICORT) 160-4.5 mcg/actuation HFA inhaler 1 Puff (Patient Supplied), 1 Puff, Inhalation, BID RT, Corinne Brook, MD, 1 Puff at 05/06/21 1019 
  potassium chloride SR (KLOR-CON 10) tablet 20 mEq, 20 mEq, Oral, DAILY, Polliard, Eva Fang T, NP, 20 mEq at 05/06/21 0814 
  albuterol (PROVENTIL HFA, VENTOLIN HFA, PROAIR HFA) inhaler 2 Puff (Patient Supplied), 2 Puff, Inhalation, Q4H PRN, Diane Arboleda MD 
  epoetin amalia-epbx (RETACRIT) injection 20,000 Units, 20,000 Units, SubCUTAneous, Q MON, WED & Veronica Hernandez MD, 20,000 Units at 05/05/21 2223   oxymetazoline (AFRIN) 0.05 % nasal spray 2 Spray, 2 Spray, Both Nostrils, BID PRN, Polliard, Allen Rued, NP, 2 [place] at 04/22/21 1800 
  sodium chloride (OCEAN) 0.65 % nasal squeeze bottle 2 Spray, 2 Spray, Both Nostrils, Q2H PRN, Polliard, Lorena Rued, NP 
  therapeutic multivitamin (THERAGRAN) tablet 1 Tab, 1 Tab, Oral, DAILY, Polliard, Allen Rued, NP, 1 Tab at 05/06/21 7823 
  isosorbide mononitrate ER (IMDUR) tablet 30 mg, 30 mg, Oral, DAILY, Polliard, Eva Fang T, NP, 30 mg at 05/06/21 1238   cephALEXin (KEFLEX) capsule 250 mg, 250 mg, Oral, BID, Rexanne Krabbe, MD, 250 mg at 05/06/21 1239 
  sodium chloride (NS) flush 5-40 mL, 5-40 mL, IntraVENous, Q8H, Polliard, Eva Fang T, NP, 10 mL at 05/06/21 6730   sodium chloride (NS) flush 5-40 mL, 5-40 mL, IntraVENous, PRN, Polliard, Eva Fang T, NP, 10 mL at 04/22/21 1238   acetaminophen (TYLENOL) tablet 650 mg, 650 mg, Oral, Q4H PRN, Polliard, Dana T, NP, 650 mg at 05/05/21 1141   hydrALAZINE (APRESOLINE) 20 mg/mL injection 10 mg, 10 mg, IntraVENous, Q4H PRN, Polliard, Eva Fang T, NP, 10 mg at 05/06/21 0440   hydrALAZINE (APRESOLINE) tablet 100 mg, 100 mg, Oral, TID, Polliard, Eva Fang T, NP, 100 mg at 05/06/21 1238   hydrOXYzine HCL (ATARAX) tablet 10 mg, 10 mg, Oral, TID PRN, Frankiargeoff, Lorena Rued, NP 
  PARoxetine (PAXIL) tablet 20 mg, 20 mg, Oral, DAILY, Polliard, Eva Fang T, NP, 20 mg at 05/06/21 9190 
  ranolazine ER (RANEXA) tablet 1,000 mg, 1,000 mg, Oral, BID, Frankiard, Lorena Rued, NP, 1,000 mg at 05/06/21 1254   tafamidis cap 61 mg (Patient Supplied), 61 mg, Oral, DAILY, Judy Walter NP, 61 mg at 05/06/21 1239   warfarin dosing per pharmacy, , Other, Rx Dosing/Monitoring, Luann Walter NP 
  miconazole (MICOTIN) 2 % powder, , Topical, BID, Shella Barthel, MD, Given at 05/06/21 7898 A/P 
  
S/P LVAD - [facility] flows [facility] Fluid overload - diuresis Acute kidney injury - monitor  
  
Risk of morbidity and mortality - high Medical decision making - high complexity Signed By: Zeenat Casper MD

## 2021-05-06 NOTE — PROGRESS NOTES
Interventional Radiology Progress Note 5/6/2021 Subjective:  
 History: ESRD, Permacath oozing. Pt had permacath placed 5/5/21. Pt is on systemic anticoagulation for an LVAD. Nurse reports that the permacath site was oozing all night. Yohannes Liu placed a quick clot and pressure dressing on it this morning during hemodialysis. Pt is anticipating discharge to rehab facility today. Today: Pt sitting up in bed- \"I'm ready to get out of this place. \" She reports no oozing from around the pressure dressing. Daughter at bedside. Objective:  
 Laboratory:     
Recent Labs 05/06/21 
2668 HGB 9.3* HCT 31.1* WBC 3.7  INR 2.1*  
BUN 42* CREA 3.91* K 4.6 Imaging: 
Ir Insert Marie Sartorius Cvc W/o Port Over 5 Yr Result Date: 5/5/2021 Successful tunneled right sided dialysis catheter placement as described above. The catheter is functioning and is ready for use. [address] [place] [address] Result Date: 4/21/2021 Cholelithiasis. Us Retroperitoneum Comp Result Date: 4/18/2021 1. No hydronephrosis. 2. Renal atrophy and acute on chronic nonspecific medical renal disease. Xr Chest [facility] Result Date: 4/27/2021 Clear lungs. Stable cardiac silhouette enlargement. Xr Chest [facility] Result Date: 4/26/2021 Right IJ catheter satisfactory position without pneumothorax. Xr Chest [facility] Result Date: 4/24/2021 Improved minimal edema. Xr Chest [facility] Result Date: 4/18/2021 Mild CHF pattern of pulmonary edema. Xr Chest [facility] Result Date: 4/16/2021 1. No radiographic evidence of acute cardiopulmonary disease. Xr Abd Port  1 V Result Date: 4/24/2021 No demonstration of bowel obstruction. Ir Insert Non Tunl Cvc Over 5 Yrs Result Date: 4/26/2021 Technically successful placement of a right internal jugular vein temporary dialysis catheter. A post procedure chest x-ray is pending.  
 
 
Physical Exam: 
Vital Signs Blood pressure (!) 80/0, pulse 85, temperature 98.1 °F (36.7 °C), resp. rate 18, height 5' 7\" (1.702 m), weight 111.9 kg (246 lb 11.1 oz), SpO2 95 %. GENERAL: alert, cooperative, no distress CATHETER EVALUATION: R IJ tunneled Permacath inspected after dressing taken down. Quick clot removed. No active leaking from skin site. Skin site re-prepped with chlorhexidine scrub, new biopatch and Tegaderm dressing placed. No signs of active bleeding. Pt tolerated well. Assessment/Plan:  
79 yo female with Permacath site oozing post placement, now resolved. -Ready for discharge to rehab from IR perspective. 
 -Continue HD as scheduled. -Catheter may be more likely to bleed in the future due to systemic anticoagulation. Please note that Dr. Melinda Briones participated in the provision of these services. Oc Barlow, [place] Interventional Radiology [address] [facility]. 
[facility]  [phone] [facility] [phone]

## 2021-05-06 NOTE — PROGRESS NOTES
[facility] 
 [address] [address], [facility] [address] [facility] Phone: [phone]  
  
Nephrology Progress Note 
Lilly Selby     1950     [id number] Date of Admission : 4/16/2021 05/06/21 CC: Follow up for ROCIO Assessment and Plan ROCIO on CKD: 
- likely 2/2 progressive CKD and CRS 
- dialysis initiated 4/26 and tolerating well 
- HD now then TTS at [facility] 
- going to [facility] then [address] upon d/c from [facility] 
 
CKD stage IIIb. - baseline around 2 
- presumed 2/2 DM, HTN. 
- Bone marrow Bx not done. - Renal US appearance of Kidneys not suggestive of Amyloidosis  
  
Chronic systolic CHF, stage C NYHA class IV 
-Combined ischemic and nonischemic cardiomyopathy 
-S/p HM 2 LVAD implant 4/15/2017 by Dr. Jacoby Phillip at [facility] 
- hx of recurrent VT : off mexiletine - chronic RV failure. 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 Chronic anemia :  
+ve PYP testing.  
- anemia of chronic disease  
- continue HEIKE  MWF Severe COPD. Pulmonary hypertension. Chronic A. fib. History of endometrial Ca ATTR amyloidosis Interval History: 
Seen and examined on dialysis. BP stable. Feeling ok. Oozing slowed down from [facility] site.  hgb stable. For d/c to [facility] today. Review of Systems: A comprehensive review of systems was negative except for that written in the HPI. Current Medications:  
Current Facility-Administered Medications Medication Dose Route Frequency  heparin (porcine) 1,000 unit/mL injection 1,900 Units  1,900 Units InterCATHeter DIALYSIS PRN And  
 heparin (porcine) 1,000 unit/mL injection 1,900 Units  1,900 Units InterCATHeter DIALYSIS PRN  
 insulin lispro (HUMALOG) injection   SubCUTAneous AC&HS  
 dextrose (D50W) injection syrg 12.5-25 g  12.5-25 g IntraVENous PRN  
 hydrocortisone (CORTAID) 1 % cream   Topical BID  insulin glargine (LANTUS) injection 12 Units  12 Units SubCUTAneous QHS  bumetanide (BUMEX) tablet 2 mg  2 mg Oral TID  mineral oil (FLEET) enema   Rectal PRN  
 benzocaine-zinc cl-benzalkonium cl (ORAJEL) 20-0.1-0.02 % mucosal gel   Oral PRN  
 glucose chewable tablet 16 g  4 Tab Oral PRN  
 glucagon (GLUCAGEN) injection 1 mg  1 mg IntraMUSCular PRN  
 0.9% sodium chloride infusion 250 mL  250 mL IntraVENous PRN  
 budesonide-formoteroL (SYMBICORT) 160-4.5 mcg/actuation HFA inhaler 1 Puff (Patient Supplied)  1 Puff Inhalation BID RT  
 potassium chloride SR (KLOR-CON 10) tablet 20 mEq  20 mEq Oral DAILY  albuterol (PROVENTIL HFA, VENTOLIN HFA, PROAIR HFA) inhaler 2 Puff (Patient Supplied)  2 Puff Inhalation Q4H PRN  
 epoetin amalia-epbx (RETACRIT) injection 20,000 Units  20,000 Units SubCUTAneous Q MON, WED & FRI  
 oxymetazoline (AFRIN) 0.05 % nasal spray 2 Spray  2 Spray Both Nostrils BID PRN  
 sodium chloride (OCEAN) 0.65 % nasal squeeze bottle 2 Spray  2 Spray Both Nostrils Q2H PRN  therapeutic multivitamin (THERAGRAN) tablet 1 Tab  1 Tab Oral DAILY  isosorbide mononitrate ER (IMDUR) tablet 30 mg  30 mg Oral DAILY  cephALEXin (KEFLEX) capsule 250 mg  250 mg Oral BID  sodium chloride (NS) flush 5-40 mL  5-40 mL IntraVENous Q8H  
 sodium chloride (NS) flush 5-40 mL  5-40 mL IntraVENous PRN  
 acetaminophen (TYLENOL) tablet 650 mg  650 mg Oral Q4H PRN  
 hydrALAZINE (APRESOLINE) 20 mg/mL injection 10 mg  10 mg IntraVENous Q4H PRN  
 hydrALAZINE (APRESOLINE) tablet 100 mg  100 mg Oral TID  hydrOXYzine HCL (ATARAX) tablet 10 mg  10 mg Oral TID PRN  
 PARoxetine (PAXIL) tablet 20 mg  20 mg Oral DAILY  ranolazine ER (RANEXA) tablet 1,000 mg  1,000 mg Oral BID  tafamidis cap 61 mg (Patient Supplied)  61 mg Oral DAILY  warfarin dosing per pharmacy   Other Rx Dosing/Monitoring  miconazole (MICOTIN) 2 % powder   Topical BID Allergies Allergen Reactions  Benzodiazepines Other (comments) Respiratory issues Objective: 
Vitals:  
Vitals: 05/06/21 1045 05/06/21 1100 05/06/21 1115 05/06/21 1130 Pulse:      
Resp: 17 18 18 18 Temp:    98 °F (36.7 °C) TempSrc:      
SpO2:      
Weight:      
Height:      
 
Intake and Output: 
05/06 0701 - 05/06 1900 In: 0 Out: 2000 05/04 1901 - 05/06 0700 In: 400 [P.O.:400] Out: 1075 [PZURE:5291] Physical Examination: 
General: Morbidly obese, in NAD Eve Carolus Neck: Supple,no mass palpable Lungs : CTA  
CVS: RRR, VAD sounds Abdomen: Soft, NT, BS +, obese Extremities: LE discoloration, LUE lymphedema Neurologic: Awake, alert, minimal tremors now Access: RI PC in place  : adelina 
  
 
[]    High complexity decision making was performed 
[]    Patient is at high-risk of decompensation with multiple organ involvement Lab Data Personally Reviewed: I have reviewed all the pertinent labs, microbiology data and radiology studies during assessment. Recent Labs 05/06/21 
3294 05/05/21 
0032 05/05/21 
9507 05/04/21 
2551   --  138 137  
K 4.6  --  4.3 4.1   --  104 104 CO2 24  --  26 28 *  --  107* 83 BUN 42*  --  34* 23* CREA 3.91*  --  3.47* 2.50* CA 9.0  --  9.1 9.0 PHOS 5.8*  --  4.6 3.4 ALB 3.5  --  3.4* 3.2* ALT 12  --  10* 10* INR 2.1* 2.3*  --  3.6* Recent Labs 05/06/21 
1452 05/05/21 
1444 05/04/21 
5682 WBC 3.7 3.2* 3.0* HGB 9.3* 9.1* 9.3* HCT 31.1* 31.9* 31.5*  179 169 No results found for: SDES Lab Results Component Value Date/Time Culture result: NO GROWTH 5 DAYS 04/23/2021 06:51 PM  
 Culture result: NO GROWTH 5 DAYS 11/10/2020 06:42 PM  
 Culture result: No Growth (<1000 cfu/mL) 11/09/2020 12:05 AM  
 Culture result: SCANT Morganella morganii ssp morganii (A) 10/30/2020 11:30 AM  
 Culture result: SCANT Morganella morganii ssp morganii (A) 10/30/2020 11:30 AM  
 Culture result: RARE DIPHTHEROIDS (A) 10/30/2020 11:30 AM  
 
Recent Results (from the past 24 hour(s)) GLUCOSE, POC  Collection Time: 05/05/21  4:21 PM  
Result Value Ref Range Glucose (POC) 138 (H) 65 - 100 mg/dL Performed by Rm Goodwin GLUCOSE, POC Collection Time: 05/05/21  9:57 PM  
Result Value Ref Range Glucose (POC) 114 (H) 65 - 100 mg/dL Performed by Anuradha Reis PROTHROMBIN TIME + INR Collection Time: 05/06/21  3:42 AM  
Result Value Ref Range INR 2.1 (H) 0.9 - 1.1 Prothrombin time 21.2 (H) 9.0 - 11.1 sec NT-PRO BNP Collection Time: 05/06/21  3:42 AM  
Result Value Ref Range NT pro-BNP 10,388 (H) <125 PG/ML  
LD Collection Time: 05/06/21  3:42 AM  
Result Value Ref Range  81 - 246 U/L  
CBC WITH AUTOMATED DIFF Collection Time: 05/06/21  3:42 AM  
Result Value Ref Range WBC 3.7 3.6 - 11.0 K/uL  
 RBC 3.37 (L) 3.80 - 5.20 M/uL HGB 9.3 (L) 11.5 - 16.0 g/dL HCT 31.1 (L) 35.0 - 47.0 % MCV 92.3 80.0 - 99.0 FL  
 MCH 27.6 26.0 - 34.0 PG  
 MCHC 29.9 (L) 30.0 - 36.5 g/dL RDW 21.2 (H) 11.5 - 14.5 % PLATELET 832 144 - 793 K/uL MPV 8.7 (L) 8.9 - 12.9 FL  
 NRBC 0.0 0  WBC ABSOLUTE NRBC 0.00 0.00 - 0.01 K/uL NEUTROPHILS 71 32 - 75 % LYMPHOCYTES 18 12 - 49 % MONOCYTES 10 5 - 13 % EOSINOPHILS 0 0 - 7 % BASOPHILS 1 0 - 1 % IMMATURE GRANULOCYTES 0 0.0 - 0.5 % ABS. NEUTROPHILS 2.6 1.8 - 8.0 K/UL  
 ABS. LYMPHOCYTES 0.7 (L) 0.8 - 3.5 K/UL  
 ABS. MONOCYTES 0.4 0.0 - 1.0 K/UL  
 ABS. EOSINOPHILS 0.0 0.0 - 0.4 K/UL  
 ABS. BASOPHILS 0.0 0.0 - 0.1 K/UL  
 ABS. IMM. GRANS. 0.0 0.00 - 0.04 K/UL  
 DF SMEAR SCANNED    
 RBC COMMENTS ANISOCYTOSIS 2+ 
    
 RBC COMMENTS OVALOCYTES PRESENT 
    
PHOSPHORUS Collection Time: 05/06/21  3:42 AM  
Result Value Ref Range Phosphorus 5.8 (H) 2.6 - 4.7 MG/DL  
AMMONIA Collection Time: 05/06/21  3:42 AM  
Result Value Ref Range Ammonia 17 <28 UMOL/L  
METABOLIC PANEL, COMPREHENSIVE Collection Time: 05/06/21  3:42 AM  
Result Value Ref Range  Sodium 137 136 - 145 mmol/L  
 Potassium 4.6 3.5 - 5.1 mmol/L Chloride 104 97 - 108 mmol/L  
 CO2 24 21 - 32 mmol/L Anion gap 9 5 - 15 mmol/L Glucose 136 (H) 65 - 100 mg/dL BUN 42 (H) 6 - 20 MG/DL Creatinine 3.91 (H) 0.55 - 1.02 MG/DL  
 BUN/Creatinine ratio 11 (L) 12 - 20 GFR est AA 14 (L) >60 ml/min/1.73m2 GFR est non-AA 11 (L) >60 ml/min/1.73m2 Calcium 9.0 8.5 - 10.1 MG/DL Bilirubin, total 0.6 0.2 - 1.0 MG/DL  
 ALT (SGPT) 12 12 - 78 U/L  
 AST (SGOT) 22 15 - 37 U/L Alk. phosphatase 55 45 - 117 U/L Protein, total 8.0 6.4 - 8.2 g/dL Albumin 3.5 3.5 - 5.0 g/dL Globulin 4.5 (H) 2.0 - 4.0 g/dL A-G Ratio 0.8 (L) 1.1 - 2.2 GLUCOSE, POC Collection Time: 05/06/21  6:36 AM  
Result Value Ref Range Glucose (POC) 128 (H) 65 - 100 mg/dL Performed by Henry Xiao GLUCOSE, POC Collection Time: 05/06/21 11:15 AM  
Result Value Ref Range Glucose (POC) 91 65 - 100 mg/dL Performed by Davion Lomeli I have reviewed the flowsheets. Chart and Pertinent Notes have been reviewed. No change in PMH ,family and social history from Consult note.  
 
 
Marylen Cluster, MD

## 2021-05-06 NOTE — PROGRESS NOTES
0730: Bedside shift change report given to [facility] [facility] (oncoming nurse) by Ratna Rios RN (offgoing nurse). Report included the following information SBAR, Kardex, Intake/Output, MAR, Recent Results, and Cardiac Rhythm Paced . 1300: Permacath dressing reinforced by IR PA 
 
1330: Right IJ removed 1447:  TRANSFER - OUT REPORT: 
 
Verbal report given to [address] (name) on Yoni Navas  being transferred to [facility] (unit) for routine progression of care Report consisted of patients Situation, Background, Assessment and  
Recommendations(SBAR). Information from the following report(s) SBAR, Kardex, Intake/Output, MAR and Recent Results was reviewed with the receiving nurse. Lines: R permacath Opportunity for questions and clarification was provided. Patient transported with: [facility] Problem: Falls - Risk of 
Goal: *Absence of Falls Description: Document Cece Sun Fall Risk and appropriate interventions in the flowsheet. Outcome: Progressing Towards Goal 
Note: Fall Risk Interventions: 
Mobility Interventions: Patient to call before getting OOB, Communicate number of staff needed for ambulation/transfer Medication Interventions: Assess postural VS orthostatic hypotension, Patient to call before getting OOB, Evaluate medications/consider consulting pharmacy, Teach patient to arise slowly Elimination Interventions: Call light in reach, Stay With Me (per policy), Toileting schedule/hourly rounds History of Falls Interventions: Bed/chair exit alarm, Door open when patient unattended Problem: Patient Education: Go to Patient Education Activity Goal: Patient/Family Education Outcome: Progressing Towards Goal 
  
Problem: Pressure Injury - Risk of 
Goal: *Prevention of pressure injury Description: Document Jaime Scale and appropriate interventions in the flowsheet.  
Outcome: Progressing Towards Goal 
Note: Pressure Injury Interventions: 
Sensory Interventions: Assess changes in LOC Moisture Interventions: Absorbent underpads Activity Interventions: Increase time out of bed, Pressure redistribution bed/mattress(bed type), PT/OT evaluation Mobility Interventions: Pressure redistribution bed/mattress (bed type), PT/OT evaluation Nutrition Interventions: Document food/fluid/supplement intake Friction and Shear Interventions: Minimize layers, Apply protective barrier, creams and emollients Problem: Patient Education: Go to Patient Education Activity Goal: Patient/Family Education Outcome: Progressing Towards Goal 
  
Problem: Heart Failure: Day 5 Goal: Activity/Safety Outcome: Progressing Towards Goal 
Goal: Diagnostic Test/Procedures Outcome: Progressing Towards Goal 
Goal: Nutrition/Diet Outcome: Progressing Towards Goal 
Goal: Discharge Planning Outcome: Progressing Towards Goal 
Goal: Medications Outcome: Progressing Towards Goal 
Goal: Respiratory Outcome: Progressing Towards Goal 
Goal: Treatments/Interventions/Procedures Outcome: Progressing Towards Goal 
Goal: Psychosocial 
Outcome: Progressing Towards Goal 
  
Problem: Heart Failure: Discharge Outcomes Goal: *Demonstrates ability to perform prescribed activity without shortness of breath or discomfort Outcome: Progressing Towards Goal 
Goal: *Left ventricular function assessment completed prior to or during stay, or planned for post-discharge Outcome: Progressing Towards Goal 
Goal: *ACEI prescribed if LVEF less than 40% and no contraindications or ARB prescribed Outcome: Progressing Towards Goal 
Goal: *Verbalizes understanding and describes prescribed diet Outcome: Progressing Towards Goal 
Goal: *Verbalizes understanding/describes prescribed medications Outcome: Progressing Towards Goal 
Goal: *Describes available resources and support systems Description: (eg: Home Health, Palliative Care, Advanced Medical Directive) Outcome: Progressing Towards Goal 
Goal: *Describes smoking cessation resources Outcome: Progressing Towards Goal 
Goal: *Understands and describes signs and symptoms to report to providers(Stroke Metric) Outcome: Progressing Towards Goal 
Goal: *Describes/verbalizes understanding of follow-up/return appt Description: (eg: to physicians, diabetes treatment coordinator, and other resources Outcome: Progressing Towards Goal 
Goal: *Describes importance of continuing daily weights and changes to report to physician Outcome: Progressing Towards Goal 
  
Problem: High Risk or History of JED Goal: Recognition of JED or High Risk for JED Outcome: Progressing Towards Goal 
Goal: Maintain Patent Airway and Adequate Oxygenation Outcome: Progressing Towards Goal 
Goal: Avoid Over-sedation Outcome: Progressing Towards Goal 
Goal: Maintenance Care of JED Outcome: Progressing Towards Goal 
  
Problem: Patient Education: Go to Patient Education Activity Goal: Patient/Family Education Outcome: Progressing Towards Goal

## 2021-05-06 NOTE — PROGRESS NOTES
Transitions of Care Plan:  RUR: 45%  Clinical Plan: Discharge today - awaiting response from [facility]  Consults: Therapy; Nephrology; [facility]  Baseline: ambulates with walker; resides home alone; open with [facility]  Disposition: [facility] for IPR; HD set up - confirmed    HD set up:  · CM called [facility] this AM and left VM with admissions rep Judy Zapata p: [phone] to advise we are confirming OP HD set up with [facility] and LVAD training is planned for next week with the clinic. Patient currently scheduled for TTS 2nd shift at [facility]. IPR Admission:   · CM left VM for [facility] liaison [facility]. Confirmed with [facility] that patient accepted for admission today - request PM transport. CM updated patient and daughter of plan for today. Both remain agreeable to [facility] for IPR then home with daughter in [place], [place]. EMTALA:  Accepted Facility:  [facility]  Accepted MD:  Renee Norman MD  RN Report# [phone]  Transportation:  [facility] (LVAD; ECG monitoring)  Transportation Time: 1500 - confirmed    [facility] RN updated of above information. Transportation packet placed on hard chart. 1235 - Updated by [facility] RN - [facility] notified that patient's ride delayed to [phone].     1240 - CM spoke with [facility] dispatch - patient's ride delayed due to \"volume\" would not provide specific reason and would not escalate patient's spot on their transport list. CM provided patient needed to get to IPR at a timely manner today - needs ECG monitoring due to LVAD with HD session this morning. CM asked to speak with supervisor - dispatcher advised that her supervisor called CM and now is gone for the day. 1245 - CM called [facility] coordinator and requested callback. 1248 - CM called [facility] - no openings. CM called [facility] - unable to transport patient to [facility].     1255 - CM spoke with [facility] Coordinator - working to get crew here by .    1400 - [facility] arrived. CM will continue to follow.     Regi Hamm, MPH  Care Manager [facility]  Available via Bevvy or

## 2021-05-06 NOTE — PROGRESS NOTES
[facility] VAD/Heart Failure Progress Note Admit Date: 2021 POD:  16 Days Post-Op Procedure:  Procedure(s): RIGHT HEART CATH Subjective: Dyspnea and itching improving; good flows Objective:  
Vitals: 
Blood pressure (!) 80/0, pulse 85, temperature 98.1 °F (36.7 °C), resp. rate 18, height 5' 7\" (1.702 m), weight 246 lb 11.1 oz (111.9 kg), SpO2 95 %. Temp (24hrs), Av °F (36.7 °C), Min:97.7 °F (36.5 °C), Max:98.1 °F (36.7 °C) Hemodynamics: 
 CO:   
 CI:   
 CVP:   
 SVR:   
 PAP Systolic:   
 PAP Diastolic:   
 PVR:   
 WE73:   
 SCV02:   
 
VAD Interrogation: LVAD (Heartmate) Pump Speed (RPM): 9400 Pump Flow (LPM): 5.3 Chatter in Lines: No 
PI (Pulsitility Index): 5.8 Power: 5.9 MAP: 84  Test: No 
Back Up  at Bedside & Labeled: Yes Power Module Test: No 
Driveline Site Care: No 
Driveline Dressing: Clean, Dry, and Intact EKG/Rhythm:   
 
Extubation Date / Time:  
 
CT Output:  
 
Ventilator: 
Ventilator Volumes Vt Spont (ml): 642 ml (21 0140) Ve Observed (l/min): 12.8 l/min (21 0140) Oxygen Therapy: 
Oxygen Therapy O2 Sat (%): 95 % (21 1245) Pulse via Oximetry: 81 beats per minute (21 2259) O2 Device: None (Room air) (21 1245) Skin Assessment: Clean, dry, & intact (21 0932) Skin Protection for O2 Device: No (21 025) O2 Flow Rate (L/min): 3 l/min (21 1400) FIO2 (%): 24 % (21 0140) CXR: 
 
Admission Weight: Last Weight Weight: 270 lb 6.4 oz (122.7 kg) Weight: 246 lb 11.1 oz (111.9 kg) Intake / Output / Drain: 
Current Shift:  0701 -  1900 In: 0 Out: 2150 [Urine:150] Last 24 hrs.:  
 
Intake/Output Summary (Last 24 hours) at 2021 1409 Last data filed at 2021 1245 Gross per 24 hour Intake 200 ml Output 2800 ml Net -2600 ml No results for input(s): CPK, CKMB, TROIQ in the last 72 hours. Recent Labs   21 3506 05/05/21 
9257 05/05/21 
7495 05/04/21 
2928   --  138 137  
K 4.6  --  4.3 4.1 CO2 24  --  26 28 BUN 42*  --  34* 23* CREA 3.91*  --  3.47* 2.50* *  --  107* 83 PHOS 5.8*  --  4.6 3.4 WBC 3.7 3.2*  --  3.0* HGB 9.3* 9.1*  --  9.3* HCT 31.1* 31.9*  --  31.5*  179  --  169 Recent Labs 05/06/21 
7121 05/05/21 
7763 05/04/21 
5275 INR 2.1* 2.3* 3.6* PTP 21.2* 23.5* 35.6* No lab exists for component: PBNP Current Facility-Administered Medications:  
  heparin (porcine) 1,000 unit/mL injection 1,900 Units, 1,900 Units, InterCATHeter, DIALYSIS PRN, 1,900 Units at 05/06/21 1057 **AND** heparin (porcine) 1,000 unit/mL injection 1,900 Units, 1,900 Units, InterCATHeter, DIALYSIS PRN, Nikki Smith MD, 1,900 Units at 05/06/21 1057   insulin lispro (HUMALOG) injection, , SubCUTAneous, AC&HS, Festus Ribeiro MD, Stopped at 05/04/21 1630 
  dextrose (D50W) injection syrg 12.5-25 g, 12.5-25 g, IntraVENous, PRN, Festus Ribeiro MD 
  hydrocortisone (CORTAID) 1 % cream, , Topical, BID, Festus Ribeiro MD, Given at 05/06/21 0809 
  insulin glargine (LANTUS) injection 12 Units, 12 Units, SubCUTAneous, QHS, Festus Ribeiro MD, 12 Units at 05/05/21 2219   bumetanide (BUMEX) tablet 2 mg, 2 mg, Oral, TID, Nikki Smith MD, 2 mg at 05/06/21 1239 
  mineral oil (FLEET) enema, , Rectal, PRN, Festus Ribeiro MD 
  benzocaine-zinc cl-benzalkonium cl (ORAJEL) 20-0.1-0.02 % mucosal gel, , Oral, PRN, Keshav Grier MD, Given at 05/04/21 1004   glucose chewable tablet 16 g, 4 Tab, Oral, PRN, Keshav Grier MD 
  glucagon [facility]) injection 1 mg, 1 mg, IntraMUSCular, PRN, Keshav Grier MD 
  0.9% sodium chloride infusion 250 mL, 250 mL, IntraVENous, PRN, Darrell Marcano NP 
  budesonide-formoteroL (SYMBICORT) 160-4.5 mcg/actuation HFA inhaler 1 Puff (Patient Supplied), 1 Puff, Inhalation, BID RT, Keshav Grier MD, 1 Puff at 05/06/21 1019 
  potassium chloride SR (KLOR-CON 10) tablet 20 mEq, 20 mEq, Oral, DAILY, Polliard, Sandy Angles T, NP, 20 mEq at 05/06/21 0814 
  albuterol (PROVENTIL HFA, VENTOLIN HFA, PROAIR HFA) inhaler 2 Puff (Patient Supplied), 2 Puff, Inhalation, Q4H PRN, Danny Escamilla MD 
  epoetin amalia-epbx (RETACRIT) injection 20,000 Units, 20,000 Units, SubCUTAneous, Q MON, WED & Sudhir Hong MD, 20,000 Units at 05/05/21 2223   oxymetazoline (AFRIN) 0.05 % nasal spray 2 Spray, 2 Spray, Both Nostrils, BID PRN, Polliard, Christie Lauro, NP, 2 [place] at 04/22/21 1800 
  sodium chloride (OCEAN) 0.65 % nasal squeeze bottle 2 Spray, 2 Spray, Both Nostrils, Q2H PRN, Polliargeoff, Christie Lauro, NP 
  therapeutic multivitamin (THERAGRAN) tablet 1 Tab, 1 Tab, Oral, DAILY, Polliard, Christie Lauro, NP, 1 Tab at 05/06/21 7894 
  isosorbide mononitrate ER (IMDUR) tablet 30 mg, 30 mg, Oral, DAILY, Polliard, Sandy Angles T, NP, 30 mg at 05/06/21 1238   cephALEXin (KEFLEX) capsule 250 mg, 250 mg, Oral, BID, Kasey Saunders MD, 250 mg at 05/06/21 1239 
  sodium chloride (NS) flush 5-40 mL, 5-40 mL, IntraVENous, Q8H, PollBel torrese Angles T, NP, 10 mL at 05/06/21 8283   sodium chloride (NS) flush 5-40 mL, 5-40 mL, IntraVENous, PRN, Polliard, Sandy Angles T, NP, 10 mL at 04/22/21 1238   acetaminophen (TYLENOL) tablet 650 mg, 650 mg, Oral, Q4H PRN, Polliard, Dana T, NP, 650 mg at 05/05/21 1141   hydrALAZINE (APRESOLINE) 20 mg/mL injection 10 mg, 10 mg, IntraVENous, Q4H PRN, Jose Angel, Sandy Muller T, NP, 10 mg at 05/06/21 0440   hydrALAZINE (APRESOLINE) tablet 100 mg, 100 mg, Oral, TID, Matd, Sandy Muller T, NP, 100 mg at 05/06/21 1238   hydrOXYzine HCL (ATARAX) tablet 10 mg, 10 mg, Oral, TID PRN, Harmony Waltere Lauro, NP 
  PARoxetine (PAXIL) tablet 20 mg, 20 mg, Oral, DAILY, Sandy Walter T, NP, 20 mg at 05/06/21 4096 
  ranolazine ER (RANEXA) tablet 1,000 mg, 1,000 mg, Oral, BID, Harmony Waltere Lauro, NP, 1,000 mg at 05/06/21 1254   tafamidis cap 61 mg (Patient Supplied), 61 mg, Oral, DAILY, Rosi Walter NP, 61 mg at 05/06/21 1239   warfarin dosing per pharmacy, , Other, Rx Dosing/Monitoring, Eric Walter NP 
  miconazole (MICOTIN) 2 % powder, , Topical, BID, Madeleine Claros MD, Given at 05/06/21 3474 A/P 
  
S/P LVAD - good flows [facility] - coumadin Fluid overload - diuresis Acute kidney injury - monitor  
  
Risk of morbidity and mortality - high Medical decision making - high complexity Signed By: Kavon Griffin MD

## 2021-05-06 NOTE — DISCHARGE INSTRUCTIONS
Discharge Instructions       PATIENT ID: Mark Clarke  MRN: [id number]   YOB: 1950    DATE OF ADMISSION: 4/16/2021  9:18 AM    DATE OF DISCHARGE: 5/6/2021    PRIMARY CARE PROVIDER: Radha Aj NP     ATTENDING PHYSICIAN: Josee rGullon MD  DISCHARGING PROVIDER: Melina Marx MD    To contact this individual call [phone] and ask the  to page. If unavailable ask to be transferred the Adult Hospitalist Department. DISCHARGE DIAGNOSES Advanced CKD to ESRD. Acute on chronic Advanced heart failure    CONSULTATIONS: IP CONSULT TO NEPHROLOGY  IP CONSULT TO INFECTIOUS DISEASES  IP CONSULT TO HEMATOLOGY  IP CONSULT TO HEMATOLOGY  IP CONSULT TO NEUROLOGY  IP CONSULT TO CARDIOLOGY    PROCEDURES/SURGERIES: Procedure(s):  RIGHT HEART CATH    FOLLOW UP APPOINTMENTS:   Follow-up Information     Follow up With Specialties Details Why Contact Info    [address] [place] [address] today inpatient rehab [address]    Radha Aj NP Nurse Practitioner In 1 week  [address] [facility]  [phone]        In 1 week  Nephrology      In 1 week  Heart failure team           ADDITIONAL CARE RECOMMENDATIONS: follow up with PCP and Nephrology/cardiology    DIET: Resume previous diet    DISCHARGE MEDICATIONS:      · It is important that you take the medication exactly as they are prescribed. · Keep your medication in the bottles provided by the pharmacist and keep a list of the medication names, dosages, and times to be taken in your wallet. · Do not take other medications without consulting your doctor. NOTIFY YOUR PHYSICIAN FOR ANY OF THE FOLLOWING:   Fever over 101 degrees for 24 hours. Chest pain, shortness of breath, fever, chills, nausea, vomiting, diarrhea, change in mentation, falling, weakness, bleeding. Severe pain or pain not relieved by medications.   Or, any other signs or symptoms that you may have questions about. It was our pleasure to help take care of you and we hope you get well very soon.     DISPOSITION:    Home With:   OT  PT  HH  RN       SNF/Inpatient Rehab/LTAC   x Independent/assisted living    Hospice    Other:     CDMP Checked:   Yes x     PROBLEM LIST Updated:  Yes x     Signed:   Pau Templeton MD  5/6/2021  1:22 PM

## 2021-05-06 NOTE — DISCHARGE SUMMARY
Discharge Summary       PATIENT ID: Ericka Anderson  MRN: [id number]   YOB: 1950    DATE OF ADMISSION: 4/16/2021  9:18 AM    DATE OF DISCHARGE: 5/6/2021   PRIMARY CARE PROVIDER: Domenic Grayson NP     ATTENDING PHYSICIAN: Toya Oliver MD  DISCHARGING PROVIDER: Toya Oliver MD    To contact this individual call [phone] and ask the  to page. If unavailable ask to be transferred the Adult Hospitalist Department. CONSULTATIONS: IP CONSULT TO NEPHROLOGY  IP CONSULT TO INFECTIOUS DISEASES  IP CONSULT TO HEMATOLOGY  IP CONSULT TO HEMATOLOGY  IP CONSULT TO NEUROLOGY  IP CONSULT TO CARDIOLOGY    PROCEDURES/SURGERIES: Procedure(s):  RIGHT HEART CATH    ADMITTING [address] [place] [address] COURSE:   Evaluated and treated for acute on chronic systolic CHF, and worsening kidney disease to ESRD, requiring HD. Dc with op HD. Risk of Re-Admission: high  DISCHARGE DIAGNOSES / PLAN:      #. Acute on Chronic systolic congestive heart failure , s/p LVAD  #. Chronic respiratory failure: on trilogy at home. Pt ok to use Bipap while in rehab instead of trilogy  - NYHA class IV ;  s/p LVAD heartMate 2 , increased speed per HF team   - Advanced heart failure team following and adjusting meds. AC with warfarin   - RHC- 4/20 to measure heart pressures      CKD 3 - advanced to ESRD- now on HD. Nephrology following - permCath placed for HD      COPD-  Stopped nebs- resume home inhalers- symbicort and albuterol   Severe tremors- unclear etiology- stopped nebs- Improved after blood transfusion and dialysis- neurology eval completed- neurontin discontinued     Subclinical hypothyroidism- normal free T4 with minimally elevated TSH- DC synthroid. F/u op if TSH continues to rise to >10/ or clinically shows symptoms can start synthroid     Constipation with KUB suggesting fecal impaction- resolved after suppository   DM2 - A1c 7.2 - op treatment.   BLE edema with ulcers- improved - Elevate legs- Wound care following  Chronic sternal wound - On antibiotics chronic per ID   JED -  home trilogy, BiPAP inpatient if patient able to tolerate this  Depression - cont Paroxetine   H/o breast cancer (1992) - s/p bilateral mastectomy/chemo and   Endometrial ca s/p hysterectomy  Pancytopenia- stable- Etiology unknown - Hematology evaluated- transfused RBCs  Skin rash: arms, hydrocortisone topical cream.     FOLLOW UP APPOINTMENTS:    Follow-up Information     Follow up With Specialties Details Why [facility] today inpatient rehab [address]    Phyllis Cordero, TEA Nurse Practitioner In 1 week  [address] [facility]  [phone]        In 1 week  Nephrology      In 1 week  Heart failure team         ADDITIONAL CARE RECOMMENDATIONS:  Follow up with PCP, Nephrology and advanced heart failure team.    DIET: Resume previous diet    ACTIVITY: Activity as tolerated    DISCHARGE MEDICATIONS:  Current Discharge Medication List      START taking these medications    Details   bumetanide (BUMEX) 2 mg tablet Take 1 Tab by mouth three (3) times daily for 30 days. Qty: 90 Tab, Refills: 0      isosorbide mononitrate ER (IMDUR) 30 mg tablet Take 1 Tab by mouth daily for 30 days. Qty: 30 Tab, Refills: 0      therapeutic multivitamin (THERAGRAN) tablet Take 1 Tab by mouth daily for 30 days. Qty: 30 Tab, Refills: 0         CONTINUE these medications which have NOT CHANGED    Details   !! warfarin (COUMADIN) 1 mg tablet Take 4 Tabs by mouth daily. Qty: 180 Tab, Refills: 5      FeroSul 325 mg (65 mg iron) tablet TAKE 1 TABLET BY MOUTH EVERY MORNING WITH BREAKFAST  Qty: 30 Tab, Refills: 1      ranolazine ER (RANEXA) 500 mg SR tablet TAKE 2 TABLETS BY MOUTH TWICE DAILY  Qty: 120 Tab, Refills: 3      hydrALAZINE (APRESOLINE) 25 mg tablet Take 4 Tabs by mouth three (3) times daily. Qty: 1080 Tab, Refills: 0    Comments: Dose increase on 12/11/20.  Patient still has tablets at home. Please place on file. Thank you! insulin detemir (LEVEMIR U-100 INSULIN SC) 20 Units by SubCUTAneous route two (2) times a day. PARoxetine (PAXIL) 30 mg tablet Take 20 mg by mouth daily. !! warfarin (COUMADIN) 5 mg tablet Take 1 Tab by mouth daily. Take per INR tracker  Qty: 45 Tab, Refills: 5      albuterol (PROVENTIL HFA, VENTOLIN HFA, PROAIR HFA) 90 mcg/actuation inhaler Take 2 Puffs by inhalation every four (4) hours as needed for Wheezing. Qty: 1 Inhaler, Refills: 1      cephALEXin (Keflex) 500 mg capsule Take 1 Cap by mouth two (2) times a day. Qty: 60 Cap, Refills: 0      tafamidis (Vyndamax) 61 mg cap Take 61 mg by mouth daily. Qty: 30 Cap, Refills: 2    Associated Diagnoses: Cardiac amyloidosis (HCC)      magnesium oxide (MAG-OX) 400 mg tablet Take 1 Tab by mouth daily. Qty: 30 Tab, Refills: 5      budesonide-formoteroL (Symbicort) 160-4.5 mcg/actuation HFAA Take 2 Puffs by inhalation two (2) times daily as needed. hydrOXYzine HCL (ATARAX) 10 mg tablet Take 10 mg by mouth three (3) times daily as needed for Itching (hives). !! - Potential duplicate medications found. Please discuss with provider. STOP taking these medications       torsemide (DEMADEX) 100 mg tablet Comments:   Reason for Stopping:             NOTIFY YOUR PHYSICIAN FOR ANY OF THE FOLLOWING:   Fever over 101 degrees for 24 hours. Chest pain, shortness of breath, fever, chills, nausea, vomiting, diarrhea, change in mentation, falling, weakness, bleeding. Severe pain or pain not relieved by medications. Or, any other signs or symptoms that you may have questions about.     DISPOSITION:    Home With:   OT  PT  HH  RN       Long term SNF/Inpatient Rehab   x Independent/assisted living    Hospice    Other:     PATIENT CONDITION AT DISCHARGE:     Functional status    Poor     Deconditioned     Independent      Cognition     Lucid     Forgetful     Dementia      Catheters/lines (plus indication)    Fletcher     PICC     PEG     None      Code status     Full code     DNR      PHYSICAL EXAMINATION AT DISCHARGE:  Patient seen and examined at bedside, Condition stable, explained discharge and follow up plans. BP (!) 80/0   Pulse 85   Temp 98.1 °F (36.7 °C)   Resp 18   Ht 5' 7\" (1.702 m)   Wt 111.9 kg (246 lb 11.1 oz)   SpO2 95%   BMI 38.64 kg/m²   General:  Alert, oriented, No acute distress. Comfortable, off O2. Obese [employer]. Dewitte Mannheim on dc to rehab  Resp:  No accessory muscle use, Good AE.   Neuro:  Grossly normal, no focal neuro deficits, follows commands     CHRONIC MEDICAL DIAGNOSES:  Problem List as of 5/6/2021 Date Reviewed: 4/25/2021          Codes Class Noted - Resolved    Dyspnea ICD-10-CM: R06.00  ICD-9-CM: 786.09  4/16/2021 - Present        HAIRSTON (dyspnea on exertion) ICD-10-CM: R06.00  ICD-9-CM: 786.09  1/7/2021 - Present        Incontinence in female ICD-10-CM: R32  ICD-9-CM: 625.6  12/8/2020 - Present        LVAD (left ventricular assist device) present [facility]) ICD-10-CM: Z95.811  ICD-9-CM: V43.21  12/8/2020 - Present        Hospital discharge follow-up ICD-10-CM: Z09  ICD-9-CM: V67.59  12/8/2020 - Present        ICD (implantable cardioverter-defibrillator) battery depletion ICD-10-CM: Z45.02  ICD-9-CM: V53.32  12/8/2020 - Present        Coagulopathy ([facility] ICD-10-CM: D68.9  ICD-9-CM: 286.9  11/10/2020 - Present        Open wound of left lower leg ICD-10-CM: T56.299Y  ICD-9-CM: 891.0  11/10/2020 - Present        Anemia ICD-10-CM: D64.9  ICD-9-CM: 285.9  11/9/2020 - Present        ROCIO (acute kidney injury) ([facility] ICD-10-CM: N17.9  ICD-9-CM: 584.9  11/9/2020 - Present        NSVT (nonsustained ventricular tachycardia) ([facility] ICD-10-CM: I47.2  ICD-9-CM: 427.1  11/6/2020 - Present        NICM (nonischemic cardiomyopathy) ([facility] ICD-10-CM: I42.8  ICD-9-CM: 425.4  11/4/2020 - Present        Coronary artery disease involving native coronary artery of native heart without angina pectoris ICD-10-CM: I25.10  ICD-9-CM: 414.01  11/4/2020 - Present        JED on CPAP ICD-10-CM: G47.33, Z99.89  ICD-9-CM: 327.23, V46.8  11/4/2020 - Present        Chronic venous insufficiency ICD-10-CM: I87.2  ICD-9-CM: 459.81  11/4/2020 - Present        Ulcer of right foot, limited to breakdown of skin ([facility] ICD-10-CM: Z53.707  ICD-9-CM: 707.15  11/4/2020 - Present        Acute on chronic systolic CHF (congestive heart failure) (HCC) ICD-10-CM: I50.23  ICD-9-CM: 428.23, 428.0  10/27/2020 - Present        Obesity, morbid ([facility] ICD-10-CM: E66.01  ICD-9-CM: 278.01  10/1/2020 - Present              37 minutes were spent with the patient on counseling and coordination of care.     Signed:   Sneha Kelley MD  5/6/2021  1:10 PM

## 2021-05-10 NOTE — TELEPHONE ENCOUNTER
Ruth Carpenter NP  You 2 hours ago (12:51 PM)     No, let's see what her attending says. Thank you! Message text     Patient's daughter notified. Charlette Gaona RN.

## 2021-05-10 NOTE — TELEPHONE ENCOUNTER
Message received from patient's daughter, Harvinder Benjamin, stating that since patient's discharge from [facility], \"things have been going downhill. \" She stated she has been having confusion and tremors, shortness of breath and she is \"really agitated. \" She reported patient was given albumin without much improvement. Per Harvinder Benjamin patient, \"heard someone say she may go back to [place]. [facility]'s,\" but Harvinder Benjamin stated she has not been notified of that plan. She stated she did attempt to contact [facility] over the weekend, but has been having a difficult garry receiving information. She requested a return call at [phone]. Spoke with TIFFANY Walter NP who reported she did receive one call from [facility] over the weekend. Per TIFFANY Walter NP the plan for albumin was discussed as [facility] thought patient may be dehydrated. She stated no further concerns were brought up. Sonya Stevenson who stated ever since patient left [place] [place] things have not been going well. She stated that the problems began after patient had permacath placed and she \"lost a lot of blood. \" Per Harvinder Benjamin [facility] has checked a few hemoglobin levels which she reported have been fine. She stated patient is \"sometimes fine,\" and other times is very confused and does not remember where she is or what is going on. She reported patient called her Friday confused and did not know where she was. She was tearful and was unsure if [facility] was her new permanent living facility. She also reported patient at times does not recall when [facility] has visited. Harvinder Benjamin stated she spoke with her this morning and patient was confused, her tremors have been getting worse, and she had worsening shortness of breath. She also reported that the other day pt was dizzy and was saying, \"the paint on the wall was moving. \"    Harvinder Benjamin also raised concerns re: patient's new BiPAP and is worried she may be over oxygenated, which she stated was a problem in the past. She also noted the other day when she called patient was on oxygen for desaturations and again stated she was concerned patient is getting too much oxygen. She stated she is on her way to [address] [place] [address] now and plans to speak with Dr. Caroline Omer, the hospitalist there, for further information regarding the plan. She stated she has spoken with the nurses several times, but has not received answers to her questions. Encouraged Melinda to request to speak with the charge nurse, in addition to Dr. Caroline Omer, to voice her concerns. Informed her will review with provider and contact her with any further recommendations. She verbalized understanding and had no further questions. Levi Amado RN.

## 2021-05-11 PROBLEM — G93.40 ACUTE ENCEPHALOPATHY: Status: ACTIVE | Noted: 2021-01-01

## 2021-05-11 NOTE — PROCEDURES
[facility]      Procedure Note - Arterial Access:   Performed by Misty Guerra MD.    Immediately prior to the procedure, the patient was reevaluated and found suitable for the planned procedure and any planned medications. Immediately prior to the procedure a time out was called to verify the correct patient, procedure, equipment, staff, and marking as appropriate. Central line Bundle:  Full sterile barrier precautions used. 5 mL 1% Lidocaine placed at insertion site. Insertion Date: 5/11/21 Time:1230   Procedure Location:  CVICU. Condition: Elective. Consent:  YES. Method: Seldinger technique. Site Prep: ChloraPrep. Procedure: Arterial Catheter Insertion in Right, Radial Artery   Catheter inserted into a new site. Number of Attempts:  2   Indication: Monitoring and Blood Drawing. There was bright red, pulsatile blood return. Femoral Site? no. If Yes, reason femoral site was chosen:   Catheter secured. Biopatch in place? yes. Sterile Bio-occlusive dressing placed. Complication None. The procedure was tolerated well.     Misty Guerra MD   Critical Care Medicine  [facility]

## 2021-05-11 NOTE — TELEPHONE ENCOUNTER
Message received from patient's daughter stating patient was taken to [facility] overnight and is ventilated and doing poorly. She stated she was able to be with pt initially, but was asked to leave as patient will need negative COVID PCR before she is able to have visitors. Zuleima Flemingho stated she was informed this may take up to 24 hours. Zuleima Divya stated she was alarmed and is concerned about being kept updated on plan of care. She stated she is staying at a hotel in [place] for the time being. Informed her will notify inpatient providers and will request she be updated once providers have seen pt. She verbalized understanding and had no further questions at this time. Reviewed with Albert Erickson NP who stated they will round on patient later today and provide update once she is seen. Will await update. Maribel Umaña RN.

## 2021-05-11 NOTE — PROGRESS NOTES
Pharmacist Note  Warfarin Dosing  Consult provided for this 78 y. o.female to manage warfarin for LVAD    INR Goal: 1.8-2.5    Home regimen/ tablet size: 5 mg daily    Drugs that may increase INR: None  Drugs that may decrease INR: None  Other current anticoagulants/ drugs that may increase bleeding risk: None  Risk factors: Age > 65  Daily INR ordered: YES    Recent Labs     05/11/21  0556 05/11/21  0217   HGB 10.5* 10.9*   INR 3.3* 3.4*     Date               INR                  Dose  5/11  3.3  hold                                                                                Assessment/ Plan: Will hold warfarin today due to supratherapeutic INR    Pharmacy will continue to monitor daily and adjust therapy as indicated. Please contact the pharmacist at x [phone] or  for outpatient recommendations if needed.

## 2021-05-11 NOTE — ED PROVIDER NOTES
62-year-old female history of asthma, breast cancer, renal insufficiency, diabetes, hypertension, LVAD patient with recent hospitalization presents to the emergency department for altered mental status. Daughter reports that the patient has not been back to her baseline or doing well since discharge from the hospital.  She recently started on hemodialysis and becomes altered between periods of dialysis. Last dialysis was on Saturday. There is no report of fever. Patient has been using oxygen for the past several days. There is no history of COPD although there is some report of chronic CO2 retention. The patient is altered and unable to provide any meaningful history. The history is provided by the EMS personnel, a relative and medical records. Altered mental status   This is a recurrent problem. The current episode started more than 1 week ago. The problem has been gradually worsening. Associated symptoms include somnolence. Pertinent negatives include no unresponsiveness and no violence. Her past medical history is significant for heart disease. Past Medical History:   Diagnosis Date    Asthma     Cancer ([facility]     breast    Cancer ([facility]     endometrial    Congestive heart failure, unspecified     CRI (chronic renal insufficiency)     Depression     Diabetes (HCC)     Hypertension        Past Surgical History:   Procedure Laterality Date    HX HERNIA REPAIR      HX HYSTERECTOMY      HX MASTECTOMY      IR INSERT NON TUNL CVC OVER 5 YRS  4/26/2021    IR INSERT TUNL CVC W/O PORT OVER 5 YR  5/5/2021         No family history on file.     Social History     Socioeconomic History    Marital status:      Spouse name: Not on file    Number of children: Not on file    Years of education: Not on file    Highest education level: Not on file   Occupational History    Not on file   Social Needs    Financial resource strain: Not on file    Food insecurity     Worry: Not on file Inability: Not on file    Transportation needs     Medical: Not on file     Non-medical: Not on file   Tobacco Use    Smoking status: Never Smoker    Smokeless tobacco: Never Used   Substance and Sexual Activity    Alcohol use: Not Currently    Drug use: Not on file    Sexual activity: Not on file   Lifestyle    Physical activity     Days per week: Not on file     Minutes per session: Not on file    Stress: Not on file   Relationships    Social connections     Talks on phone: Not on file     Gets together: Not on file     Attends Gnosticism service: Not on file     Active member of club or organization: Not on file     Attends meetings of clubs or organizations: Not on file     Relationship status: Not on file    Intimate partner violence     Fear of current or ex partner: Not on file     Emotionally abused: Not on file     Physically abused: Not on file     Forced sexual activity: Not on file   Other Topics Concern    Not on file   Social History Narrative    Not on file         ALLERGIES: Benzodiazepines    Review of Systems   Unable to perform ROS: Mental status change       There were no vitals filed for this visit. Physical Exam  Vitals signs and nursing note reviewed. Constitutional:       General: She is not in acute distress. Appearance: She is well-developed. She is obese. She is ill-appearing. She is not toxic-appearing or diaphoretic. HENT:      Head: Normocephalic and atraumatic. Nose: Nose normal.      Mouth/Throat:      Mouth: Mucous membranes are moist.      Pharynx: Oropharynx is clear. Eyes:      Extraocular Movements: Extraocular movements intact. Conjunctiva/sclera: Conjunctivae normal.      Pupils: Pupils are equal, round, and reactive to light. Neck:      Musculoskeletal: Normal range of motion and neck supple. No neck rigidity or muscular tenderness. Cardiovascular:      Rate and Rhythm: Regular rhythm. Tachycardia present.       Heart sounds: Murmur (Constant whir consistent with LVAD) present. Pulmonary:      Effort: Pulmonary effort is normal. Tachypnea present. No respiratory distress. Breath sounds: Normal breath sounds. No wheezing. Chest:      Chest wall: No tenderness. Abdominal:      General: Abdomen is flat. There is no distension. Palpations: Abdomen is soft. Tenderness: There is no abdominal tenderness. There is no guarding or rebound. Musculoskeletal: Normal range of motion. General: No swelling, tenderness, deformity or signs of injury. Right lower leg: No edema. Left lower leg: No edema. Skin:     General: Skin is warm and dry. Capillary Refill: Capillary refill takes less than 2 seconds. Neurological:      General: No focal deficit present. Mental Status: She is alert and oriented to person, place, and time. Psychiatric:         Mood and Affect: Mood normal.         Behavior: Behavior normal.          MDM  Number of Diagnoses or Management Options  Diagnosis management comments: 53-year-old female presents as above with concerns for sepsis in the setting of LVAD of suspected pulmonary etiology. Potentially also metabolic encephalopathy. Started on antibiotics in the emergency department. She decompensated while in the emergency department with worsening mental status, worsening hypoxia, unable to tolerate noninvasive ventilation. Intubated after discussion with the daughter. Plan for admission to the ICU in critical condition.        Amount and/or Complexity of Data Reviewed  Clinical lab tests: reviewed  Tests in the radiology section of CPT®: reviewed  Tests in the medicine section of CPT®: reviewed           Intubation    Date/Time: 5/11/2021 3:08 AM  Performed by: Shawn Fortune MD  Authorized by: Shawn Fortune MD     Consent:     Consent obtained:  Emergent situation  Pre-procedure details:     Patient status:  Altered mental status    Mallampati score:  II    Pretreatment medications:  None    Paralytics:  Rocuronium  Procedure details:     Preoxygenation:  Bag valve mask    CPR in progress: no      Intubation method:  Oral    Oral intubation technique:  Video-assisted    Laryngoscope blade: Mac 3    Tube size (mm):  7.5    Tube type:  Cuffed    Number of attempts:  1    Cricoid pressure: no      Tube visualized through cords: yes    Placement assessment:     ETT to lip:  22    Tube secured with:  ETT calabrese    Breath sounds:  Equal    Placement verification: chest rise, condensation, CXR verification, direct visualization, equal breath sounds, ETCO2 detector and tube exhalation      CXR findings:  ETT in proper place  Post-procedure details:     Patient tolerance of procedure: Tolerated well, no immediate complications              ED EKG interpretation:  Rhythm: Ventricularly paced at a rate of approximately 140. ST segment:  No concerning ST elevations or depressions. This EKG was interpreted by Henna Lopez MD,ED Provider. 0330: Discussed with Dr. Marilee Ram of heart failure team who will see patient as consulting service tomorrow, recommends a-line, recommends ICU admission    0330: Discussed with intensivist via [facility] for admission.     IMPRESSION:  No diagnosis found.    - Broad Spectrum Antibiotics ordered: Cefepime  - Repeat lactic acid ordered for time 0530  - Re-assessment performed at time 3:32 AM and clinical condition worsening.    - Hypotension or Lactic Acidosis present (SBP<90, MAP<65, Lactate >4): NO IVF:  Not given due to concerns for volume overload  - Persistent Hypotension despite IVF resuscitation: NO  Vasopressors: Not indicated due to septic shock not present    Plan:  Admit to ICU    Total critical care time spent exclusive of procedures:  45 minutes    Chad Topete MD        Repeat Sepsis focused physical exam at time 3:33 AM   Vital signs   Visit Vitals  Pulse 99   Temp (!) 101 °F (38.3 °C)   Resp 18   SpO2 98%        Cardiopulmonary exam  Regular rate and rhythm, baseline whirring on cardiac auscultation, bilateral coarse breath sounds  Capillary refill  Less than 2 seconds    Peripheral pulse evaluation  Weak but palpable     Skin exam  Cool and dry    Michelet Venegas MD    [facility] Consult for Admission  3:37 AM    ED Room Number: ER17/17  Patient Name and age:  Patrick Gallagher 78 y.o.  female  Working Diagnosis:   1. Sepsis with acute hypoxic respiratory failure without septic shock, due to unspecified organism ([address]    2. Altered mental status, unspecified altered mental status type    3. Febrile illness, acute    4.  Acute respiratory failure with hypoxia ([address]        COVID-19 Suspicion:  yes  Sepsis present:  yes  Reassessment needed: no  Code Status:  Full Code  Readmission: yes  Isolation Requirements:  yes  Recommended Level of Care:  ICU  Department:[facility] Adult ED - 21

## 2021-05-11 NOTE — TELEPHONE ENCOUNTER
Spoke with Sim Johnston NP who reported patient daughter is at the bedside and has been updated. Roberth Frost RN.

## 2021-05-11 NOTE — PROCEDURES
[facility] Team [facility]  [phone]    Vitals   Pre   Post   Assessment   Pre   Post     Temp  Temp: 100.2 °F (37.9 °C) (05/11/21 1630)  99.1 LOC  Sedated sedated   HR   Pulse (Heart Rate): 82 (05/11/21 1600) 79 Lungs   Intubated  intubated   B/P   BP: 106/82(dressing changed) (05/11/21 1815) 95/77 Cardiac   LVAD  s1s2   Resp   Resp Rate: 24 (05/11/21 1600) 18 Skin   Intact catheter Intact catheter   Pain level  Sedated sedated Edema    None   None   Orders:    Duration:   Start:    1630 End:    1930 Total:   3 hours   Dialyzer:   Dialyzer/Set Up Inspection: Revaclear (05/11/21 1630)   K Bath:   Dialysate K (mEq/L): 3 (05/11/21 1630)   Ca Bath:   Dialysate CA (mEq/L): 2.5 (05/11/21 1630)   Na/Bicarb:   Dialysate NA (mEq/L): 140 (05/11/21 1630)   Target Fluid Removal:   Goal/Amount of Fluid to Remove (mL): 1000 mL (05/11/21 1630)   Access     Type & Location:   Right tunneled catheter. Good aspirations/flushes. Dressing changed. No s/s infection noted.    Labs     Obtained/Reviewed   Critical Results Called   Date when labs were drawn-  Hgb-    HGB   Date Value Ref Range Status   05/11/2021 10.5 (L) 11.5 - 16.0 g/dL Final     K-    Potassium   Date Value Ref Range Status   05/11/2021 4.0 3.5 - 5.1 mmol/L Final     Ca-   Calcium   Date Value Ref Range Status   05/11/2021 9.3 8.5 - 10.1 MG/DL Final     Bun-   BUN   Date Value Ref Range Status   05/11/2021 49 (H) 6 - 20 MG/DL Final     Creat-   Creatinine   Date Value Ref Range Status   05/11/2021 6.51 (H) 0.55 - 1.02 MG/DL Final        Medications/ Blood Products Given     Name   Dose   Route and Time     Heparin dwell  AP=1.9ml,  =1.9ml IC at 1935             Blood Volume Processed (BVP):    64.8 Net Fluid   Removed:  [id number]   Comments   Time Out Done: 1600  Primary Nurse Rpt Pre:Haily Gibbons  Primary Nurse Rpt Yunior Montgomery, RN  Pt Jose Carlos HD as prescribed  Tx Summary:Treatment initiated via right [facility]. Catheter. Tolerated treatment. All possible blood returned. CAtheter ports flushed with NS; heparin instilled as dwell. Sterile caps applied. Endorsed to primary, Ericka Kelly RN  Admiting Diagnosis:Acute Encephalopathy  Pt's previous clinic-  Consent signed - Informed Consent Verified: Yes (05/11/21 1630)  Rajiita Consent - Obtained  Hepatitis Status- Negative on 4/21/2021  Machine #- Machine Number: [id number] (05/11/21 1630)  Telemetry status-yes  Pre-dialysis wt. - unable to weigh accurately  Post dialysis wt.:unable to weigh accurately

## 2021-05-11 NOTE — ED NOTES
100 mg of Ketamine and 100mg of Sagar pushed by Cyrus time now. One bolus of fluid given time.
Called [employer] to page Rep to interrogate patient's device. Waiting on a call back from local rep. MD aware.
Dr. Scto Davies at bedside. [employer] performed interrogation on ICD.
POC B
Pt begins to desat to 67. Pt is placed on bipap by respiratory and immediately begins to vomitt. Pt is taken off bagged by BVM.
Pt transport to CT with RN and Respiratory.
(4) excellent

## 2021-05-11 NOTE — PROGRESS NOTES
Renal Dosing/Monitoring  Medication: famotidine   Current regimen:  20 mg every 12 hr  Recent Labs     05/11/21  0556 05/11/21  0217   CREA 6.51* 6.50*   BUN 49* 47*     Estimated CrCl:  ESRD on iHD  Plan: Change to 20 mg q24h for CrCl < 50 ml/min

## 2021-05-11 NOTE — PROCEDURES
[facility]      Procedure Note - Central Venous Access:   Performed by Laura Messina MD    Obtained informed Consent. Immediately prior to the procedure, the patient was reevaluated and found suitable for the planned procedure and any planned medications. Immediately prior to the procedure a time out was called to verify the correct patient, procedure, equipment, staff, and marking as appropriate. Central line Bundle:  Full sterile barrier precautions used. 7-Step Sterility Protocol followed. (cap, mask sterile gown, sterile gloves, large sterile sheet, hand hygiene, 2% chlorhexidine for cutaneous antisepsis)  5 mL 1% Lidocaine placed at insertion site. Patient positioned in Trendelenburg?yes   The site was prepped with ChloraPrep. Using Seldinger technique a Quad Lumen CVC was placed in the Left, Internal Jugular Vein via direct cannulation with 1 number of attempts for IV Access. Ultrasound Guidance was utilized. There was good dark, non-pulsatile blood return in all ports. Column manometry confirmed venous pressure. Femoral Site? no. If Yes, reason femoral site was chosen:   Catheter secured. Biopatch in place? yes. Sterile Bio-occlusive dressing placed. The following complications were encountered: None. A follow-up chest x-ray was ordered post procedure. The procedure was tolerated well.       Laura Messina MD  Critical Care Medicine  [facility]

## 2021-05-11 NOTE — PROGRESS NOTES
[facility] in [place], [place]  Inpatient Consult Progress Note      Patient name: Patrick Gallagher  Patient : 1950  Patient MRN: [id number]  Consulting MD: Santa Cheatham MD  Date of service: 21    CHIEF COMPLAINT:  Chief Complaint   Patient presents with    Altered mental status        PLAN:  Continue set speed 9400rpm, low speed 9000rpm   TTE today- pending   Wean pressors for MAP > 65  Albumin 250ml x 2 doses   Hold hydralazine/Imdur due to hypotension  Hold Tafamidis for now- can resume when able to take PO  Intolerant to BB/ACEi/ARB/ARNI due to aTTR  Intolerant to MRA due to hyperkalemia  Volume management per Nephrology; strict I/O, standing weights  Plan for HD today   Vent management per Intensivist   Sedation management per intensivist  Antibiotic regimen per intensivist   Blood cultures, wound culture and Urine cx pending   Coumadin dose per pharmacy; goal INR lowered to 1.8-2.5 due to GIB  Continue ranolazine, no ectopy - followed by Dr. Bienvenido Noel OP  Antibiotics for chronic sternal wound infection- continue Keflex po when appropriate  Cont to hold synthroid for now, recheck TSH before discharge   Bowel regimen  PT/OT when appropriate   Up to date on flu, PNA, and COVID vaccinations      IMPRESSION:  Likely urosepsis  Acute respiratory failure   Chronic RV failure  Coronary artery disease  · [facility] (2016) high grade ramus and small PDA disease, borderline disease of LAD and takeoff of pRCA  Chronic systolic heart failure  · Stage C, NYHA class IV improved to IIIA symptoms with LVAD  · Combined ischemic and non-ischemic cardiomyopathy, 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 Bird Monae)  · C/b delayed extubation due to severe COPD  · C/b critical illness polyneuropathy  · C/b prolonged hospitalization post-LVAD, 54 days  · C/b sternal wound infection s/p debridement (by Dr. Claudia Landin) s/p wound vac  · C/b sacral ulcer  · Would culture positive for Staph aureus, not MRSA  · C/b LVAD site drainage, improved  S/p CRT-ICD  · ICD fired due to electrolyte imbalance (2011)  H/o breast cancer (1992)   · s/p bilateral mastectomy/chemo and endometrial cancer s/p hysterectomy  · Lymphedema of LLE due to cancer treatment  Severe COPD with FEV1 50%  Depression  Atrial fibrillation  H/o \"two mini strokes\"  S/p fall with hip facture   · Right hip hemmiarthroplasty (5/23/18) by Dr. Katelyn Galvan)  · S/p removed hip hardwarare due to pain (4/15/19)  COPD severe  CKD, stage 4- on HD  Hyperkalemia, resolved  Pulmonary hypertension  Cardiac risk factors:  · Morbid obesity, Body mass index is 42.29 kg/m². · DM2 insulin dependent  · JED on Trilogy  · HL  Urinary incontinence, severe  · no procedures due to anticoagulation  Endometrial cancer (2002)  HTN  HL     CARDIAC IMAGING:  TTE 5/11/21 pending  TTE 4/19 shows improved LVIDd, 6.68 cm with RVIDd 5.14 cm and TAPSE 1.1 cm   TTE 4/28/21 LVIDd 7.37cm, RVIDd 5.27cm, TAPSE 1.44cm   Echo (9/24/19) LVEF 20-25%, AV opens 1:1, no AR  Echo (5/29/18) LVEF 10-%, ramps study done, report in [facility]  Echo (1/9/18) ramp study done, LVEDD 7.1cm  LHC (11/9/18) 2 vessel disease with 90% OM, 80% PDA, DSA to PDA branch  RHC 4/20 showed adequate Sal CI 3.0 but severely elevated RA pressure 24 mmHg  CXR negative 4/27/21     INTERVAL HISTORY:  Ms. Kasia Sears is a 78 y.o. Female s/p LVAD implant with HM 2 and ESRD requiring HD that was recently admitted for RV failure and worsening renal function. She was discharged on 5/5/21 to [facility] for rehabilitation before transitioning home to live her with daughter. In the last few days she had worsening mental status changed, decreased UOP and respiratory distress after possible aspiration- she was intubated and admitted to [facility] for further management.      LVAD INTERROGATION:  Device interrogated in person  Device function normal, normal flow, no events  LVAD   Pump Speed (RPM): 9300  Pump Flow (LPM): 4.5  MAP: 62  PI (Pulsitility Index): 6.5  Power: 5.4   Test: No  Back Up  at Bedside & Labeled: No  Power Module Test: No  Driveline Site Care: No  Driveline Dressing: Moist  Outpatient: No  MAP in Therapeutic Range (Outpatient): Yes       PHYSICAL EXAM:  Visit Vitals  BP (!) 67/57   Pulse 86   Temp 100.2 °F (37.9 °C)   Resp 24   Wt 269 lb 10 oz (122.3 kg)   SpO2 95%   BMI 42.23 kg/m²     General: Patient is well developed, well-nourished in no acute distress, sedated   HEENT: Normocephalic and atraumatic. No scleral icterus. Pupils are equal, round and reactive to light and accomodation. No conjunctival injection. Oropharynx is clear. Neck: Supple. No evidence of thyroid enlargements or lymphadenopathy. JVD: Cannot be appreciated   Lungs: Breath sounds are equal and clear bilaterally. No wheezes, rhonchi, or rales. On Vent  Heart: Regular rate and rhythm with normal S1 and S2. No murmurs, gallops or rubs. Abdomen: Soft, no mass or tenderness. No organomegaly or hernia. Bowel sounds present. Genitourinary and rectal: deferred  Extremities: No cyanosis, clubbing, or edema. Neurologic: No focal sensory or motor deficits are noted. Grossly intact. Psychiatric: Awake, alert an doriented x 3. Appropriate mood and affect. Skin: Warm, dry and well perfused. No lesions, nodules or rashes are noted.     REVIEW OF SYSTEMS:  Review of Systems   Unable to perform ROS: Acuity of condition         PAST MEDICAL HISTORY:  Past Medical History:   Diagnosis Date    Asthma     Cancer ([facility]     breast    Cancer ([facility]     endometrial    Congestive heart failure, unspecified     CRI (chronic renal insufficiency)     Depression     Diabetes ([facility]     Hypertension        PAST SURGICAL HISTORY:  Past Surgical History:   Procedure Laterality Date    HX HERNIA REPAIR      HX HYSTERECTOMY      HX MASTECTOMY      IR INSERT NON TUNL CVC OVER 5 YRS  4/26/2021    IR INSERT TUNL CVC W/O PORT OVER 5 YR  5/5/2021       FAMILY HISTORY:  No family history on file. SOCIAL HISTORY:  Social History     Socioeconomic History    Marital status:      Spouse name: Not on file    Number of children: Not on file    Years of education: Not on file    Highest education level: Not on file   Tobacco Use    Smoking status: Never Smoker    Smokeless tobacco: Never Used   Substance and Sexual Activity    Alcohol use: Not Currently       LABORATORY RESULTS:     Labs Latest Ref Rng & Units 5/11/2021 5/11/2021 5/6/2021 5/5/2021 5/4/2021 5/3/2021 5/2/2021   WBC 3.6 - 11.0 K/uL 8.0 8.5 3.7 3. 2(L) 3.0(L) 3.4(L) 3. 2(L)   RBC 3.80 - 5.20 M/uL 3.89 4.06 3.37(L) 3.48(L) 3.49(L) 3.37(L) 3.24(L)   Hemoglobin 11.5 - 16.0 g/dL 10. 5(L) 10. 9(L) 9.3(L) 9.1(L) 9.3(L) 8.7(L) 8.5(L)   Hematocrit 35.0 - 47.0 % 35.7 36.8 31. 1(L) 31. 9(L) 31. 5(L) 30. 2(L) 29. 2(L)   MCV 80.0 - 99.0 FL 91.8 90.6 92.3 91.7 90.3 89.6 90.1   Platelets 162 - 670 K/uL 193 248 160 179 169 160 145(L)   Lymphocytes 12 - 49 % 11(L) 15 18 17 24 13 13   Monocytes 5 - 13 % 8 11 10 11 13 10 10   Eosinophils 0 - 7 % 0 0 0 0 0 0 0   Basophils 0 - 1 % 1 1 1 1 0 0 1   Bands 0 - 6 % - - - - - - -   Albumin 3.5 - 5.0 g/dL 3.9 4.2 3.5 3. 4(L) 3. 2(L) 3.4(L) 3.4(L)   Calcium 8.5 - 10.1 MG/DL 9.3 10.0 9.0 9.1 9.0 9.0 9.0   Glucose 65 - 100 mg/dL 172(H) 172(H) 136(H) 107(H) 83 92 109(H)   BUN 6 - 20 MG/DL 49(H) 47(H) 42(H) 34(H) 23(H) 39(H) 33(H)   Creatinine 0.55 - 1.02 MG/DL 6.51(H) 6.50(H) 3.91(H) 3.47(H) 2.50(H) 3.51(H) 3.16(H)   Sodium 136 - 145 mmol/L 137 135(L) 137 138 137 136 137   Potassium 3.5 - 5.1 mmol/L 4.0 4.3 4.6 4.3 4.1 4.2 4.4   TSH 0.36 - 3.74 uIU/mL - - - - - - -   LDH 81 - 246 U/L - 402(H) 231 215 204 189 194   Some recent data might be hidden     Lab Results   Component Value Date/Time    TSH 5.36 (H) 04/24/2021 04:32 AM    TSH 3.99 (H) 04/17/2021 04:54 AM    TSH 2.980 10/01/2020 12:00 AM       ALLERGY:  Allergies   Allergen Reactions    Benzodiazepines Other (comments)     Respiratory issues        CURRENT MEDICATIONS:    Current Facility-Administered Medications:     sodium chloride (NS) flush 5-10 mL, 5-10 mL, IntraVENous, PRN, Jonas Holman MD    [facility] injection 4 mg, 4 mg, IntraVENous, NOW, Jonas Holman MD, Stopped at 05/11/21 0313    sodium chloride (NS) flush 5-40 mL, 5-40 mL, IntraVENous, Q8H, Eamon White MD    sodium chloride (NS) flush 5-40 mL, 5-40 mL, IntraVENous, PRN, Eamon White MD    acetaminophen (TYLENOL) tablet 650 mg, 650 mg, Oral, Q6H PRN **OR** acetaminophen (TYLENOL) suppository 650 mg, 650 mg, Rectal, Q6H PRN, Eamon White MD    polyethylene glycol (MIRALAX) packet 17 g, 17 g, Oral, DAILY PRN, Eamon White MD    albuterol-ipratropium (DUO-NEB) 2.5 MG-0.5 MG/3 ML, 3 mL, Nebulization, Q6H PRN, Eamon White MD    methylPREDNISolone (PF) (SOLU-MEDROL) injection 40 mg, 40 mg, IntraVENous, DAILY, Eamon White MD, 40 mg at 05/11/21 0845    famotidine (PF) (PEPCID) 20 mg in 0.9% sodium chloride 10 mL injection, 20 mg, IntraVENous, DAILY, Eamon White MD, 20 mg at 05/11/21 0845    propofol (DIPRIVAN) 10 mg/mL infusion, 0-50 mcg/kg/min, IntraVENous, TITRATE, Eamon White MD, Last Rate: 18.3 mL/hr at 05/11/21 0845, 24.939 mcg/kg/min at 05/11/21 0845    fentaNYL (PF) 1,500 mcg/30 mL (50 mcg/mL) infusion, 0-200 mcg/hr, IntraVENous, TITRATE, Eamon White MD, Last Rate: 1 mL/hr at 05/11/21 0846, 50 mcg/hr at 05/11/21 0846    PATIENT CARE TEAM:  Patient Care Team:  Toy Alpers, NP as PCP - General (Nurse Practitioner)  Mary Brown MD (Cardiology)  Gio Newsome MD as Physician (Cardiology)  Inge Cranker, LPN as Care Coordinator  Leonor Ash MD (Cardiothoracic Surgery)  Shella Barthel, MD (Cardiology)     Thank you for allowing me to participate in this patient's care.     Anjelica Alonso Yasir Ace NP  [address] [place] [address], [address]  Phone: [phone]

## 2021-05-11 NOTE — ED TRIAGE NOTES
78year old female pt comes via squad from [facility] for a CC of SOB and Altered Mental Status. Pt was sating in the 90s on room air pt was tachypneic. EMS put her on 2 liter via nasal cannula. Pt has tremors all over her body. Pt has a LVAD with a MAP of 80. Pt is alert but disorient to situation.

## 2021-05-11 NOTE — NURSE NAVIGATOR
Chart reviewed by Heart Failure Nurse Navigator. Heart Failure database completed. EF:  <15%     ACEi/ARB/ARNi: Contraindicated aTTR per previous [facility] note    BB: Contraindicated aTTR per previous [facility] note    Aldosterone Antagonist: Contraindicated hyperkalemia     Obstructive Sleep Apnea Screening:  Yes/N/4   STOP-BANG score:   Referred to Sleep Medicine:     CRT ICD    NYHA Functional Class . Heart Failure Teach Back in Patient Education. Heart Failure Avoiding Triggers on Discharge Instructions. Cardiologist: [facility]      Post discharge follow up phone call to be made within 48-72 hours of discharge. Initial admit  4/16/21  5/6/21  Final dx code - Hypertensive heart and chronic kidney disease with heart failure and with stage 5 chronic kidney disease, or end stage renal disease. Mark Clarke  78 y.o. female with a past medical history of heart failure s/p LVAD, COPD, CKD stage 3, pulmonary HTN, Depression, HTN, and HLD admitted for dyspnea. Patient started HD during stay. Discharged from [place] [address] to Inpatient Rehab at [address] [place] [address]    Readmission  5/11/21  Readmitted from [address] [place] [address] with acute hypoxic respiratory failure. Admitted to CVICU on vent.

## 2021-05-11 NOTE — H&P
[facility]    ICU Team H&P Note    Name: Hayley Hernandez   : 1950   MRN: [id number]   Date: 2021      Subjective:   H&P Note: 2021      Patient is asked to be seen by Dr. Victor Manuel Hay for Acute hypoxic respiratory failure, necessitating the need for possible ICU care. Reason for ICU Admission: Acute hypoxic respiratory failure      HPI: 69F with hx of HF on LVAD(HMII) placed 2017, AICD, COPD on Trilogy at home, ESRD now HD dependent, she was recently discharged from [facility] on 2021, patient was transferred from long term care [facility] for acute encephalopathy and hypoxic respiratory failure, patient last had HD on 2021. Patient had lactic acid of 2.2, MAP 95, LVAD functioning with flow at 5L, PI 2.7 noted intrinsic PEEP with incomplete exhalation phase on mechanical ventilation. Febrile at 101.      POD:* No surgery found *    S/P:     Active Problem List:     Problem List  Date Reviewed: 2021          Codes Class    Dyspnea ICD-10-CM: R06.00  ICD-9-CM: 786.09         HAIRSTON (dyspnea on exertion) ICD-10-CM: R06.00  ICD-9-CM: 786.09         Incontinence in female ICD-10-CM: R32  ICD-9-CM: 625.6         LVAD (left ventricular assist device) present [facility]) ICD-10-CM: Z95.811  ICD-9-CM: V43.21         Hospital discharge follow-up ICD-10-CM: Z09  ICD-9-CM: V67.59         ICD (implantable cardioverter-defibrillator) battery depletion ICD-10-CM: Z45.02  ICD-9-CM: V53.32         Coagulopathy ([facility] ICD-10-CM: D68.9  ICD-9-CM: 286.9         Open wound of left lower leg ICD-10-CM: C23.499T  ICD-9-CM: 891.0         Anemia ICD-10-CM: D64.9  ICD-9-CM: 285.9         ROCIO (acute kidney injury) ([facility] ICD-10-CM: N17.9  ICD-9-CM: 584.9         NSVT (nonsustained ventricular tachycardia) ([facility]) ICD-10-CM: I47.2  ICD-9-CM: 427.1         NICM (nonischemic cardiomyopathy) ([facility] ICD-10-CM: I42.8  ICD-9-CM: 425.4         Coronary artery disease involving native coronary artery of native heart without angina pectoris ICD-10-CM: I25.10  ICD-9-CM: 414.01         JED on CPAP ICD-10-CM: G47.33, Z99.89  ICD-9-CM: 327.23, V46.8         Chronic venous insufficiency ICD-10-CM: I87.2  ICD-9-CM: 459.81         Ulcer of right foot, limited to breakdown of skin [facility] ICD-10-CM: Z54.735  ICD-9-CM: 707.15         Acute on chronic systolic CHF (congestive heart failure) (HCC) ICD-10-CM: I50.23  ICD-9-CM: 428.23, 428.0         Obesity, morbid (HCC) ICD-10-CM: E66.01  ICD-9-CM: 278.01               Past Medical History:      has a past medical history of Asthma, Cancer ([facility] Cancer ([facility] Congestive heart failure, unspecified, CRI (chronic renal insufficiency), Depression, Diabetes ([facility] and Hypertension. Past Surgical History:      has a past surgical history that includes hx mastectomy; hx hysterectomy; hx hernia repair; ir insert non tunl cvc over 5 yrs (4/26/2021); and ir insert tunl cvc w/o port over 5 yr (5/5/2021). Home Medications:     Prior to Admission medications    Medication Sig Start Date End Date Taking? Authorizing Provider   bumetanide (BUMEX) 2 mg tablet Take 1 Tab by mouth three (3) times daily for 30 days. 5/6/21 6/5/21  Jeison Ribeiro MD   isosorbide mononitrate ER (IMDUR) 30 mg tablet Take 1 Tab by mouth daily for 30 days. 5/7/21 6/6/21  Danielle Saunders MD   therapeutic multivitamin [facility]) tablet Take 1 Tab by mouth daily for 30 days. 5/7/21 6/6/21  Jeison Ribeiro MD   cephALEXin (KEFLEX) 250 mg capsule Take 1 Cap by mouth two (2) times a day for 30 days. 5/6/21 6/5/21  Jeison Ribeiro MD   warfarin (COUMADIN) 5 mg tablet Take 1 Tab by mouth daily. Take per INR tracker 5/7/21   Danielle Saunders MD   warfarin (COUMADIN) 1 mg tablet Take 4 Tabs by mouth daily.  5/7/21   Jeison Ribeiro MD   FeroSul 325 mg (65 mg iron) tablet TAKE 1 TABLET BY MOUTH EVERY MORNING WITH BREAKFAST 4/6/21   Josh Eli MD   ranolazine ER (RANEXA) 500 mg SR tablet TAKE 2 TABLETS BY MOUTH TWICE DAILY 3/16/21   Owen Castorena MD   hydrALAZINE (APRESOLINE) 25 mg tablet Take 4 Tabs by mouth three (3) times daily. 21   Larisa Limon NP   insulin detemir (LEVEMIR U-100 INSULIN SC) 20 Units by SubCUTAneous route two (2) times a day. Provider, Historical   PARoxetine (PAXIL) 30 mg tablet Take 20 mg by mouth daily. Provider, Historical   albuterol (PROVENTIL HFA, VENTOLIN HFA, PROAIR HFA) 90 mcg/actuation inhaler Take 2 Puffs by inhalation every four (4) hours as needed for Wheezing. 20   Owen Castorena MD   tafamidis (Vyndamax) 61 mg cap Take 61 mg by mouth daily. 20   Pratima Aviles NP   magnesium oxide (MAG-OX) 400 mg tablet Take 1 Tab by mouth daily. 20   Pratima Aviles NP   budesonide-formoteroL (Symbicort) 160-4.5 mcg/actuation HFAA Take 2 Puffs by inhalation two (2) times daily as needed. Provider, Historical   hydrOXYzine HCL (ATARAX) 10 mg tablet Take 10 mg by mouth three (3) times daily as needed for Itching (hives). Provider, Historical       Allergies/Social/Family History: Allergies   Allergen Reactions    Benzodiazepines Other (comments)     Respiratory issues      Social History     Tobacco Use    Smoking status: Never Smoker    Smokeless tobacco: Never Used   Substance Use Topics    Alcohol use: Not Currently      No family history on file. Review of Systems:     Review of systems not obtained due to patient factors. Objective:   Vital Signs:  Visit Vitals  Pulse (!) 105   Temp (!) 101 °F (38.3 °C)   Resp 24   SpO2 94%    O2 Flow Rate (L/min): 5 l/min O2 Device: Ventilator Temp (24hrs), Av.6 °F (38.1 °C), Min:100.2 °F (37.9 °C), Max:101 °F (38.3 °C)           Intake/Output:   No intake or output data in the 24 hours ending 21 [id number]    Physical Exam:    General:  Sedated and on the ventilator. No acute distress. Eyes:  Sclera anicteric. Pupils equal, round, reactive to light.    Mouth/Throat: Orotracheal tube in place, OGT in place    Neck: Supple. Lungs: On mechanical ventilation   Cardiovascular:  Tachycardic, LVAD in place   Abdomen:   Soft, distended non-tender, bowel sounds normal, non-distended. Extremities: Discoloration in bilateral LE   Skin: No acute rash or lesions. Musculoskeletal:  No swelling or deformity. Lines/Devices:  Intact, no erythema, drainage, or tenderness. Neuro: Sedated and appears comfortable on ventilator. LABS AND  DATA: Personally reviewed  Recent Labs     05/11/21 0217   WBC 8.5   HGB 10.9*   HCT 36.8        Recent Labs     05/11/21 0217   *   K 4.3   CL 99   CO2 26   BUN 47*   CREA 6.50*   *   CA 10.0     Recent Labs     05/11/21 0217   AP 66   TP 9.5*   ALB 4.2   GLOB 5.3*     Recent Labs     05/11/21 0217   INR 3.4*   PTP 33.5*      Recent Labs     05/11/21 0224   PHI 7.41   PCO2I 44.8   PO2I 84   FIO2I 40     Recent Labs     05/11/21 0217   TROIQ 0.09*       Hemodynamics:   PAP:   CO:     Wedge:   CI:     CVP:    SVR:       PVR:       Ventilator Settings:  Mode Rate Tidal Volume Pressure FiO2 PEEP   Volume control, Assist control   450 ml    40 % 8 cm H20(change made by Dr Dilan Bates)     Peak airway pressure: 36 cm H2O    Minute ventilation: 7.98 l/min        MEDS: Reviewed    XR CHEST PORT   Final Result   Endotracheal tube tip at the thoracic inlet. Nasogastric tube tip at the   gastroesophageal junction. XR ABD (KUB)   Final Result   Nasogastric tube tip projects over the gastroesophageal junction. XR CHEST PORT   Final Result   Cardiomegaly. No pulmonary edema. CT HEAD WO CONT    (Results Pending)         ECHO 4/28/2021  · LV: Estimated LVEF is <15%. Dilated left ventricle. Moderate concentric hypertrophy. Severely and globally reduced systolic function. · LA: Dilated left atrium. · MV: Mitral valve non-specific thickening. Moderate mitral valve regurgitation is present.   · TV: Moderate to severe tricuspid valve regurgitation is present. · RV: Mildly dilated right ventricle. Mildly reduced systolic function. Pacer/ICD present. · PA: Mild pulmonary hypertension. Pulmonary arterial systolic pressure is 41 mmHg. · AV: Mild aortic valve regurgitation is present. Assessment:     ICU Problems:  - Acute encephalopathy   - Heart failure s/p LVAD   - COPD  - ESRD on IHD  - DM2  - Hx of breast CA s/p mastectomy/chemo 1992    ICU Comprehensive Plan of Care:   Plans for this Shift:   1. Keep on sedation with propofol, analgesic with fentanyl   2. Minimal ventilator settings, hypoxic/hypercarbic respiratory failure  3. Start duoneb, methylprednisolone 40mg every day for 5 days for possible COPD exacerbation  4. Change ventilator setting, patient retaining PCO2 in addition to incomplete exhalation leading to intrinsic peep   5. ICD interrogated by [facility], normal function without any recorded events  6. LVAD has good flow, BP stable, heart failure team consulted, pending HF team recommendation, start Heparin gtt vs continue coumadin, currently has therapeutic INR  7. Obtain BCx, UCx, RCx  8. Trend lactic acid  9. Nephrology consult for IHD  10. Patient has UA positive for nitrites, concern for UTI leading to acute encephalopathy   11. Continue broad spectrum abx   12. R/O COVID   13. Admit to CVICU   14. Limited ECHO    Multidisciplinary Rounds Completed: Yes    ABCDEF Bundle/Checklist  Pain Medications: Fentanyl  Target RASS: N/A  Sedation Medications: Propofol  CAM-ICU:  N/A  Mobility: Bedrest  PT/OT: Not ready   Restraints: Soft wrist restraints  Discussed Plan of Care (goals of care):  Yes  Addressed Code Status: Full Code    CARDIOVASCULAR  Cardiac Gtts: None  SBP Goal of: > 90 mmHg  MAP Goal of: 65-85 mmHg  Transfusion Trigger (Hgb): <7 g/dL    RESPIRATORY  Vent Goals:   Chlorhexidine   Optimize PEEP/Ventilation/Oxygenation  Goal Tidal Volume 6 cc/kg based on IBW  Aim for lung protective ventilation  Head of bed > 30 degrees  DVT Prophylaxis (if no, list reason): SCD's or Sequential Compression Device   SPO2 Goal: > 92%  Pulmonary toilet: Duo-Nebs     GI/  Fletcher Catheter Present: Yes  GI Prophylaxis: Pepcid (famotidine)   Nutrition: No   IVFs: None  Bowel Movement: No  Bowel Regimen: None needed at this time  Insulin: Sliding Scale insulin PRN    ANTIBIOTICS  Antibiotics:  Cefepime  Vancomycin    T/L/D  Tubes: ETT and Orogastric Tube  Lines: Peripheral IV and Antonio  Drains: Fletcher Catheter    SPECIAL EQUIPMENT  IHD and LVAD    DISPOSITION  Stay in ICU    CRITICAL CARE CONSULTANT NOTE  I had a face to face encounter with the patient, reviewed and interpreted patient data including clinical events, labs, images, vital signs, I/O's, and examined patient. I have discussed the case and the plan and management of the patient's care with the consulting services, the bedside nurses and the respiratory therapist.      NOTE OF PERSONAL INVOLVEMENT IN CARE   This patient has a high probability of imminent, clinically significant deterioration, which requires the highest level of preparedness to intervene urgently. I participated in the decision-making and personally managed or directed the management of the following life and organ supporting interventions that required my frequent assessment to treat or prevent imminent deterioration. I personally spent 90 minutes of critical care time. This is time spent at this critically ill patient's bedside actively involved in patient care as well as the coordination of care and discussions with the patient's family. This does not include any procedural time which has been billed separately.     Chilo Romero MD  Staff [facility]  5/11/2021

## 2021-05-11 NOTE — PROGRESS NOTES
[facility]   [address] [facility], [facility], [facility]  Phone: [phone]   [phone]       Nephrology Progress Note  Geeta Lindsay     1950     [id number]  Date of Admission : 5/11/2021 05/11/21    CC: Follow up for ARF    Assessment and Plan   ROCIO on CKD:  - 2/2 progressive CKD and CRS  - dialysis initiated 4/26   - she was an extremely poor candidate for dialysis and not sure of the circumstances for initiation of dialysis   - scheduled HD for today   - consult Palliative care to discuss goals of care      Acute Hypoxic Resp failure :  - baseline severe COPD + chronic CHF + morbid obesity/ JED   - on vent   - per [facility]     Chronic systolic CHF, stage C NYHA class IV  Combined ischemic and nonischemic cardiomyopathy  S/p HM 2 LVAD implant 4/15/2017 by Dr. Whit Boogie at a 18  hx of recurrent VT : off mexiletine  chronic RV failure. 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 - consult AHF team      Chronic anemia :   +ve PYP testing.   - anemia of chronic disease   - continue HEIKE  MWF     Severe COPD. Pulmonary hypertension. Chronic A. fib. History of endometrial Ca  ATTR amyloidosis     Interval History:  Ms Lila Boeck was sent from [facility] for hypoxic resp failure  She is currently intubated   Events leading to intubation is unknown   Her last dialysis was on Saturday   Head CT : no acute findings   CXR without fluid overload/ PNA   She is afebrile and has normal WBC  resp viral panel including SARS COV-2 negative       Review of Systems: Review of systems not obtained due to patient factors.     Current Medications:   Current Facility-Administered Medications   Medication Dose Route Frequency    sodium chloride (NS) flush 5-10 mL  5-10 mL IntraVENous PRN    ondansetron (ZOFRAN) injection 4 mg  4 mg IntraVENous NOW    sodium chloride (NS) flush 5-40 mL  5-40 mL IntraVENous Q8H    sodium chloride (NS) flush 5-40 mL  5-40 mL IntraVENous PRN    acetaminophen (TYLENOL) tablet 650 mg  650 mg Oral Q6H PRN    Or    acetaminophen (TYLENOL) suppository 650 mg  650 mg Rectal Q6H PRN    polyethylene glycol (MIRALAX) packet 17 g  17 g Oral DAILY PRN    albuterol-ipratropium (DUO-NEB) 2.5 MG-0.5 MG/3 ML  3 mL Nebulization Q6H PRN    methylPREDNISolone (PF) (SOLU-MEDROL) injection 40 mg  40 mg IntraVENous DAILY    famotidine (PF) (PEPCID) 20 mg in 0.9% sodium chloride 10 mL injection  20 mg IntraVENous DAILY    propofol (DIPRIVAN) 10 mg/mL infusion  0-50 mcg/kg/min IntraVENous TITRATE    fentaNYL (PF) 1,500 mcg/30 mL (50 mcg/mL) infusion  0-200 mcg/hr IntraVENous TITRATE     Current Outpatient Medications   Medication Sig    bumetanide (BUMEX) 2 mg tablet Take 1 Tab by mouth three (3) times daily for 30 days.  isosorbide mononitrate ER (IMDUR) 30 mg tablet Take 1 Tab by mouth daily for 30 days.  therapeutic multivitamin (THERAGRAN) tablet Take 1 Tab by mouth daily for 30 days.  cephALEXin (KEFLEX) 250 mg capsule Take 1 Cap by mouth two (2) times a day for 30 days.  warfarin (COUMADIN) 5 mg tablet Take 1 Tab by mouth daily. Take per INR tracker    warfarin (COUMADIN) 1 mg tablet Take 4 Tabs by mouth daily.  FeroSul 325 mg (65 mg iron) tablet TAKE 1 TABLET BY MOUTH EVERY MORNING WITH BREAKFAST    ranolazine ER (RANEXA) 500 mg SR tablet TAKE 2 TABLETS BY MOUTH TWICE DAILY    hydrALAZINE (APRESOLINE) 25 mg tablet Take 4 Tabs by mouth three (3) times daily.  insulin detemir (LEVEMIR U-100 INSULIN SC) 20 Units by SubCUTAneous route two (2) times a day.  PARoxetine (PAXIL) 30 mg tablet Take 20 mg by mouth daily.  albuterol (PROVENTIL HFA, VENTOLIN HFA, PROAIR HFA) 90 mcg/actuation inhaler Take 2 Puffs by inhalation every four (4) hours as needed for Wheezing.  tafamidis (Vyndamax) 61 mg cap Take 61 mg by mouth daily.  magnesium oxide (MAG-OX) 400 mg tablet Take 1 Tab by mouth daily.     budesonide-formoteroL (Symbicort) 160-4.5 mcg/actuation HFAA Take 2 Puffs by inhalation two (2) times daily as needed. Allergies   Allergen Reactions    Benzodiazepines Other (comments)     Respiratory issues       Objective:  Vitals:    Vitals:    05/11/21 0430 05/11/21 0437 05/11/21 0442 05/11/21 0542   Pulse: (!) 105  (!) 107    Resp: 24  24    Temp:       SpO2: 94% 94% 94%    Weight:    122.3 kg (269 lb 10 oz)     Intake and Output:  No intake/output data recorded. No intake/output data recorded. Physical Examination:  Pt intubated    Yes  General: Obese   HEENT            Pupils reactive   Neck:  permacath +  Resp:  Lungs CTA B/L, no wheezing   CV:  RRR,  no murmur or rub,no LE edema  GI:  Soft, NT, + BS  Neurologic:  Sedated   Psych:             Unable to assess      []    High complexity decision making was performed  []    Patient is at high-risk of decompensation with multiple organ involvement    Lab Data Personally Reviewed: I have reviewed all the pertinent labs, microbiology data and radiology studies during assessment.     Recent Labs     05/11/21  0556 05/11/21 0217    135*   K 4.0 4.3    99   CO2 24 26   * 172*   BUN 49* 47*   CREA 6.51* 6.50*   CA 9.3 10.0   MG 3.4*  --    PHOS 4.5  --    ALB 3.9 4.2   ALT 10* 12   INR 3.3* 3.4*     Recent Labs     05/11/21  0556 05/11/21 0217   WBC 8.0 8.5   HGB 10.5* 10.9*   HCT 35.7 36.8    248     No results found for: SDES  Lab Results   Component Value Date/Time    Culture result: NO GROWTH 5 DAYS 04/23/2021 06:51 PM    Culture result: NO GROWTH 5 DAYS 11/10/2020 06:42 PM    Culture result: No Growth (<1000 cfu/mL) 11/09/2020 12:05 AM    Culture result: SCANT Morganella morganii ssp morganii (A) 10/30/2020 11:30 AM    Culture result: SCANT Morganella morganii ssp morganii (A) 10/30/2020 11:30 AM    Culture result: RARE DIPHTHEROIDS (A) 10/30/2020 11:30 AM     Recent Results (from the past 24 hour(s))   EKG, 12 LEAD, INITIAL    Collection Time: 05/11/21  1:52 AM   Result Value Ref Range    Ventricular Rate 140 BPM    Atrial Rate 110 BPM    QRS Duration 158 ms    Q-T Interval 260 ms    QTC Calculation (Bezet) 396 ms    Calculated P Axis 113 degrees    Calculated R Axis 180 degrees    Calculated T Axis -63 degrees    Diagnosis       Ventricular-paced rhythm with occasional premature ventricular complexes  When compared with ECG of 16-APR-2021 18:26,  premature ventricular complexes are now present  Vent. rate has increased BY  10 BPM     CBC WITH AUTOMATED DIFF    Collection Time: 05/11/21  2:17 AM   Result Value Ref Range    WBC 8.5 3.6 - 11.0 K/uL    RBC 4.06 3.80 - 5.20 M/uL    HGB 10.9 (L) 11.5 - 16.0 g/dL    HCT 36.8 35.0 - 47.0 %    MCV 90.6 80.0 - 99.0 FL    MCH 26.8 26.0 - 34.0 PG    MCHC 29.6 (L) 30.0 - 36.5 g/dL    RDW 20.8 (H) 11.5 - 14.5 %    PLATELET 287 258 - 713 K/uL    MPV 9.4 8.9 - 12.9 FL    NRBC 0.0 0  WBC    ABSOLUTE NRBC 0.00 0.00 - 0.01 K/uL    NEUTROPHILS 73 32 - 75 %    LYMPHOCYTES 15 12 - 49 %    MONOCYTES 11 5 - 13 %    EOSINOPHILS 0 0 - 7 %    BASOPHILS 1 0 - 1 %    IMMATURE GRANULOCYTES 0 0.0 - 0.5 %    ABS. NEUTROPHILS 6.2 1.8 - 8.0 K/UL    ABS. LYMPHOCYTES 1.3 0.8 - 3.5 K/UL    ABS. MONOCYTES 0.9 0.0 - 1.0 K/UL    ABS. EOSINOPHILS 0.0 0.0 - 0.4 K/UL    ABS. BASOPHILS 0.1 0.0 - 0.1 K/UL    ABS. IMM.  GRANS. 0.0 0.00 - 0.04 K/UL    DF SMEAR SCANNED      RBC COMMENTS OVALOCYTES  PRESENT        RBC COMMENTS ANISOCYTOSIS  2+       METABOLIC PANEL, COMPREHENSIVE    Collection Time: 05/11/21  2:17 AM   Result Value Ref Range    Sodium 135 (L) 136 - 145 mmol/L    Potassium 4.3 3.5 - 5.1 mmol/L    Chloride 99 97 - 108 mmol/L    CO2 26 21 - 32 mmol/L    Anion gap 10 5 - 15 mmol/L    Glucose 172 (H) 65 - 100 mg/dL    BUN 47 (H) 6 - 20 MG/DL    Creatinine 6.50 (H) 0.55 - 1.02 MG/DL    BUN/Creatinine ratio 7 (L) 12 - 20      GFR est AA 8 (L) >60 ml/min/1.73m2    GFR est non-AA 6 (L) >60 ml/min/1.73m2    Calcium 10.0 8.5 - 10.1 MG/DL    Bilirubin, total 0.8 0.2 - 1.0 MG/DL ALT (SGPT) 12 12 - 78 U/L    AST (SGOT) 46 (H) 15 - 37 U/L    Alk. phosphatase 66 45 - 117 U/L    Protein, total 9.5 (H) 6.4 - 8.2 g/dL    Albumin 4.2 3.5 - 5.0 g/dL    Globulin 5.3 (H) 2.0 - 4.0 g/dL    A-G Ratio 0.8 (L) 1.1 - 2.2     TROPONIN I    Collection Time: 05/11/21  2:17 AM   Result Value Ref Range    Troponin-I, Qt. 0.09 (H) <0.05 ng/mL   AMMONIA    Collection Time: 05/11/21  2:17 AM   Result Value Ref Range    Ammonia 18 <32 UMOL/L   LACTIC ACID    Collection Time: 05/11/21  2:17 AM   Result Value Ref Range    Lactic acid 2.2 (HH) 0.4 - 2.0 MMOL/L   PROTHROMBIN TIME + INR    Collection Time: 05/11/21  2:17 AM   Result Value Ref Range    INR 3.4 (H) 0.9 - 1.1      Prothrombin time 33.5 (H) 9.0 - 11.1 sec   LD    Collection Time: 05/11/21  2:17 AM   Result Value Ref Range     (H) 81 - 246 U/L   POC EG7    Collection Time: 05/11/21  2:24 AM   Result Value Ref Range    Calcium, ionized (POC) 1.22 1.12 - 1.32 mmol/L    FIO2 (POC) 40 %    pH (POC) 7.41 7.35 - 7.45      pCO2 (POC) 44.8 35.0 - 45.0 MMHG    pO2 (POC) 84 80 - 100 MMHG    HCO3 (POC) 28.1 (H) 22 - 26 MMOL/L    Base excess (POC) 3 mmol/L    sO2 (POC) 96 92 - 97 %    Site RIGHT RADIAL      Device: NASAL CANNULA      Flow rate (POC) 5 L/M    Allens test (POC) YES      Specimen type (POC) ARTERIAL     SAMPLES BEING HELD    Collection Time: 05/11/21  2:27 AM   Result Value Ref Range    SAMPLES BEING HELD 1RED     COMMENT        Add-on orders for these samples will be processed based on acceptable specimen integrity and analyte stability, which may vary by analyte.    GLUCOSE, POC    Collection Time: 05/11/21  3:10 AM   Result Value Ref Range    Glucose (POC) 273 (H) 65 - 117 mg/dL    Performed by Nathaniel Toledo    COVID-19 RAPID TEST    Collection Time: 05/11/21  3:35 AM   Result Value Ref Range    Specimen source Nasopharyngeal      COVID-19 rapid test Not detected NOTD     BLOOD GAS + IONIZED CALCIUM    Collection Time: 05/11/21  3:44 AM Result Value Ref Range    pH 7.22 (LL) 7.35 - 7.45      PCO2 56 (H) 35 - 45 mmHg    PO2 297 (H) 80 - 100 mmHg    BICARBONATE 22 22 - 26 mmol/L    BASE DEFICIT 6.0 mmol/L    O2  (H) 92 - 97 %    Calcium, ionized 1.19 1.13 - 1.32 mmol/L    O2 METHOD VENT      FIO2 100 %    MODE ASSIST CONTROL      Tidal volume 450.0      SET RATE 18      PEEP/CPAP 10.0      Sample source ARTERIAL      SITE RIGHT RADIAL      ABEBE'S TEST YES      Critical value read back Called to MD Sendy on 05/11/2021 at 03:46     TEMPERATURE 37.0     URINALYSIS W/MICROSCOPIC    Collection Time: 05/11/21  3:57 AM   Result Value Ref Range    Color DARK YELLOW      Appearance TURBID (A) CLEAR      Specific gravity 1.029 1.003 - 1.030      pH (UA) 5.0 5.0 - 8.0      Protein 100 (A) NEG mg/dL    Glucose Negative NEG mg/dL    Ketone TRACE (A) NEG mg/dL    Blood Negative NEG      Urobilinogen 0.2 0.2 - 1.0 EU/dL    Nitrites Positive (A) NEG      Leukocyte Esterase LARGE (A) NEG      WBC >100 (H) 0 - 4 /hpf    RBC 20-50 0 - 5 /hpf    Epithelial cells MODERATE (A) FEW /lpf    Bacteria 1+ (A) NEG /hpf    Hyaline cast 2-5 0 - 5 /lpf   URINE CULTURE HOLD SAMPLE    Collection Time: 05/11/21  3:57 AM    Specimen: Serum; Urine   Result Value Ref Range    Urine culture hold        Urine on hold in Microbiology dept for 2 days. If unpreserved urine is submitted, it cannot be used for addtional testing after 24 hours, recollection will be required.    BILIRUBIN, CONFIRM    Collection Time: 05/11/21  3:57 AM   Result Value Ref Range    Bilirubin UA, confirm Negative NEG     RESPIRATORY VIRUS PANEL W/COVID-19, PCR    Collection Time: 05/11/21  4:29 AM    Specimen: Nasopharyngeal   Result Value Ref Range    Adenovirus Not detected NOTD      Coronavirus 229E Not detected NOTD      Coronavirus HKU1 Not detected NOTD      Coronavirus CVNL63 Not detected NOTD      Coronavirus OC43 Not detected NOTD      Metapneumovirus Not detected NOTD      Rhinovirus and Enterovirus Not detected NOTD      Influenza A Not detected NOTD      Influenza A, subtype H1 Not detected NOTD      Influenza A, subtype H3 Not detected NOTD      INFLUENZA A H1N1 PCR Not detected NOTD      Influenza B Not detected NOTD      Parainfluenza 1 Not detected NOTD      Parainfluenza 2 Not detected NOTD      Parainfluenza 3 Not detected NOTD      Parainfluenza virus 4 Not detected NOTD      RSV by PCR Not detected NOTD      B. parapertussis, PCR Not detected NOTD      Bordetella pertussis - PCR Not detected NOTD      Chlamydophila pneumoniae DNA, QL, PCR Not detected NOTD      Mycoplasma pneumoniae DNA, QL, PCR Not detected NOTD      SARS-CoV-2, PCR Not detected NOTD     CBC WITH AUTOMATED DIFF    Collection Time: 05/11/21  5:56 AM   Result Value Ref Range    WBC 8.0 3.6 - 11.0 K/uL    RBC 3.89 3.80 - 5.20 M/uL    HGB 10.5 (L) 11.5 - 16.0 g/dL    HCT 35.7 35.0 - 47.0 %    MCV 91.8 80.0 - 99.0 FL    MCH 27.0 26.0 - 34.0 PG    MCHC 29.4 (L) 30.0 - 36.5 g/dL    RDW 20.6 (H) 11.5 - 14.5 %    PLATELET 707 214 - 967 K/uL    MPV 9.7 8.9 - 12.9 FL    NRBC 0.0 0  WBC    ABSOLUTE NRBC 0.00 0.00 - 0.01 K/uL    NEUTROPHILS 79 (H) 32 - 75 %    LYMPHOCYTES 11 (L) 12 - 49 %    MONOCYTES 8 5 - 13 %    EOSINOPHILS 0 0 - 7 %    BASOPHILS 1 0 - 1 %    IMMATURE GRANULOCYTES 1 (H) 0.0 - 0.5 %    ABS. NEUTROPHILS 6.3 1.8 - 8.0 K/UL    ABS. LYMPHOCYTES 0.9 0.8 - 3.5 K/UL    ABS. MONOCYTES 0.6 0.0 - 1.0 K/UL    ABS. EOSINOPHILS 0.0 0.0 - 0.4 K/UL    ABS. BASOPHILS 0.1 0.0 - 0.1 K/UL    ABS. IMM.  GRANS. 0.1 (H) 0.00 - 0.04 K/UL    DF SMEAR SCANNED      RBC COMMENTS POLYCHROMASIA  1+        RBC COMMENTS ANISOCYTOSIS  2+       CK    Collection Time: 05/11/21  5:56 AM   Result Value Ref Range    CK 72 26 - 195 U/L   METABOLIC PANEL, COMPREHENSIVE    Collection Time: 05/11/21  5:56 AM   Result Value Ref Range    Sodium 137 136 - 145 mmol/L    Potassium 4.0 3.5 - 5.1 mmol/L    Chloride 103 97 - 108 mmol/L    CO2 24 21 - 32 mmol/L    Anion gap 10 5 - 15 mmol/L    Glucose 172 (H) 65 - 100 mg/dL    BUN 49 (H) 6 - 20 MG/DL    Creatinine 6.51 (H) 0.55 - 1.02 MG/DL    BUN/Creatinine ratio 8 (L) 12 - 20      GFR est AA 8 (L) >60 ml/min/1.73m2    GFR est non-AA 6 (L) >60 ml/min/1.73m2    Calcium 9.3 8.5 - 10.1 MG/DL    Bilirubin, total 0.7 0.2 - 1.0 MG/DL    ALT (SGPT) 10 (L) 12 - 78 U/L    AST (SGOT) 30 15 - 37 U/L    Alk. phosphatase 60 45 - 117 U/L    Protein, total 8.6 (H) 6.4 - 8.2 g/dL    Albumin 3.9 3.5 - 5.0 g/dL    Globulin 4.7 (H) 2.0 - 4.0 g/dL    A-G Ratio 0.8 (L) 1.1 - 2.2     LACTIC ACID    Collection Time: 05/11/21  5:56 AM   Result Value Ref Range    Lactic acid 1.4 0.4 - 2.0 MMOL/L   MAGNESIUM    Collection Time: 05/11/21  5:56 AM   Result Value Ref Range    Magnesium 3.4 (H) 1.6 - 2.4 mg/dL   PHOSPHORUS    Collection Time: 05/11/21  5:56 AM   Result Value Ref Range    Phosphorus 4.5 2.6 - 4.7 MG/DL   PROTHROMBIN TIME + INR    Collection Time: 05/11/21  5:56 AM   Result Value Ref Range    INR 3.3 (H) 0.9 - 1.1      Prothrombin time 33.0 (H) 9.0 - 11.1 sec   MIXING STUDY, APTT    Collection Time: 05/11/21  5:56 AM   Result Value Ref Range    aPTT mixing study          aPTT 33.3 (H) 22.1 - 31.0 sec    aPTT 1:1 mix patient  sec     Mixing study not performed as it is uninterpretable when PTT is normal or <= 5 seconds prolonged. EKG, 12 LEAD, INITIAL    Collection Time: 05/11/21  6:38 AM   Result Value Ref Range    Ventricular Rate 98 BPM    Atrial Rate 98 BPM    QRS Duration 162 ms    Q-T Interval 442 ms    QTC Calculation (Bezet) 564 ms    Calculated R Axis -78 degrees    Calculated T Axis 91 degrees    Diagnosis       Ventricular-paced rhythm  When compared with ECG of 11-MAY-2021 01:52,  MANUAL COMPARISON REQUIRED, DATA IS UNCONFIRMED             Total time spent with patient:  xxx   min.                                Care Plan discussed with:  Patient     Family      RN      Consulting Physician [address] [facility],         I have reviewed the flowsheets. Chart and Pertinent Notes have been reviewed. No change in PMH ,family and social history from Consult note.       Ciarra Jones MD

## 2021-05-12 NOTE — CONSULTS
PALLIATIVE MEDICINE        Official note to follow      Patient and daughter well-known to me from previous admission. Met with daughter at the bedside today. Goals at this time are to continue current level care with the hope of recovery. I have plan to follow-up with her tomorrow as well. Hopefully the patient will be able to engage in dialogue and extubated tomorrow. Colette Goldmann.  [address] MSN, FNP-BC, [facility]

## 2021-05-12 NOTE — PROGRESS NOTES
[facility] VAD/Heart Failure Progress Note    Admit Date: 2021  POD:  * No surgery found *    Procedure:          Subjective:   Remains intubated and sedated; will try HD before extubation; good flows     Objective:   Vitals:  Blood pressure 100/80, pulse 75, temperature (!) 96.6 °F (35.9 °C), resp. rate 13, height 5' 7\" (1.702 m), weight 229 lb 0.9 oz (103.9 kg), SpO2 97 %.   Temp (24hrs), Av.1 °F (37.3 °C), Min:96.6 °F (35.9 °C), Max:100.2 °F (37.9 °C)    Hemodynamics:   CO:     CI:     CVP: CVP (mmHg): 13 mmHg (21 1200)   SVR:     PAP Systolic:     PAP Diastolic:     PVR:     TP56:     SCV02:      VAD Interrogation: LVAD (Heartmate)  Pump Speed (RPM): 9400  Pump Flow (LPM): 5.3  Chatter in Lines: No  PI (Pulsitility Index): 5.7  Power: 5.8  MAP: 70   Test: Yes  Back Up  at Bedside & Labeled: Yes  Power Module Test: Yes  Driveline Site Care: No  Driveline Dressing: Clean, Dry, and Intact    EKG/Rhythm:      Extubation Date / Time:     CT Output:     Ventilator:  Ventilator Volumes  Vt Set (ml): 450 ml (21 1108)  Vt Exhaled (Machine Breath) (ml): 471 ml (21 1108)  Ve Observed (l/min): 10.4 l/min (21 1108)    Oxygen Therapy:  Oxygen Therapy  O2 Sat (%): 97 % (21 1200)  Pulse via Oximetry: 109 beats per minute (21 0437)  O2 Device: Endotracheal tube;Ventilator (21 08)  Skin Assessment: Clean, dry, & intact (21 08)  Skin Protection for O2 Device: No (21 08)  O2 Flow Rate (L/min): 5 l/min (21 0231)  FIO2 (%): 50 % (21 1108)  ETCO2 (mmHg): 36 mmHg (21 0200)    CXR:    Admission Weight: Last Weight   Weight: 269 lb 10 oz (122.3 kg) Weight: 229 lb 0.9 oz (103.9 kg)     Intake / Output / Drain:  Current Shift:  0701 -  1900  In: 266.5 [I.V.:266.5]  Out: 40 [Urine:40]  Last 24 hrs.:     Intake/Output Summary (Last 24 hours) at 2021 1311  Last data filed at 2021 1100  Gross per 24 hour   Intake 1500.8 ml   Output 1127 ml   Net 373.8 ml           Recent Labs     05/12/21 0223 05/11/21 0556 05/11/21 0217   CPK 61 72  --    TROIQ  --   --  0.09*     Recent Labs     05/12/21 0223 05/11/21 0556 05/11/21 0217    137 135*   K 3.8 4.0 4.3   CO2 22 24 26   BUN 27* 49* 47*   CREA 3.67* 6.51* 6.50*   * 172* 172*   PHOS 3.6 4.5  --    MG 2.8* 3.4*  --    WBC 11.4* 8.0 8.5   HGB 8.9* 10.5* 10.9*   HCT 29.9* 35.7 36.8    193 248     Recent Labs     05/12/21 0223 05/11/21 0556 05/11/21 0217   INR 1.9* 3.3* 3.4*   PTP 18.9* 33.0* 33.5*   APTT 36.5* 33.3*  --      No lab exists for component: PBNP        Current Facility-Administered Medications:     [START ON 5/13/2021] epoetin amalia-epbx (RETACRIT) injection 20,000 Units, 20,000 Units, SubCUTAneous, Q TUE, THU & SAT, Jaspreet Mendoza MD    piperacillin-tazobactam (ZOSYN) 3.375 g in 0.9% sodium chloride (MBP/ADV) 100 mL MBP, 3.375 g, IntraVENous, Q12H, Shira MCGREGOR MD, Last Rate: 25 mL/hr at 05/12/21 0754, 3.375 g at 05/12/21 0754    Vancomycin Pharmacy Dosing, , Other, PRN, Shira Smith MD    vancomycin (VANCOCIN) 750 mg in 0.9% sodium chloride 250 mL (VIAL-MATE), 750 mg, IntraVENous, ONCE, Shira MCGREGOR MD, 750 mg at 05/12/21 1200    warfarin (COUMADIN) tablet 2 mg, 2 mg, Oral, ONCE, Vivien Cisneros MD    albumin human 5% (BUMINATE) solution 12.5 g, 12.5 g, IntraVENous, Q4H, Triny Limon, NP, 12.5 g at 05/12/21 1206    sodium chloride (NS) flush 5-10 mL, 5-10 mL, IntraVENous, PRN, Zi Phillip MD    sodium chloride (NS) flush 5-40 mL, 5-40 mL, IntraVENous, Q8H, Aure Pereira MD, 10 mL at 05/12/21 0622    sodium chloride (NS) flush 5-40 mL, 5-40 mL, IntraVENous, PRN, Aure Pereira MD    acetaminophen (TYLENOL) tablet 650 mg, 650 mg, Oral, Q6H PRN **OR** acetaminophen (TYLENOL) suppository 650 mg, 650 mg, Rectal, Q6H PRN, Aure Pereira MD    polyethylene glycol (MIRALAX) packet 17 g, 17 g, Oral, DAILY PRN, Santa Cheatham MD    albuterol-ipratropium (DUO-NEB) 2.5 MG-0.5 MG/3 ML, 3 mL, Nebulization, Q6H PRN, Santa Cheatham MD    methylPREDNISolone (PF) (SOLU-MEDROL) injection 40 mg, 40 mg, IntraVENous, DAILY, Santa Cheatham MD, 40 mg at 05/12/21 0829    famotidine (PF) (PEPCID) 20 mg in 0.9% sodium chloride 10 mL injection, 20 mg, IntraVENous, DAILY, Santa Cheatham MD, 20 mg at 05/12/21 8362    propofol (DIPRIVAN) 10 mg/mL infusion, 0-50 mcg/kg/min, IntraVENous, TITRATE, Santa Cheatham MD, Last Rate: 33 mL/hr at 05/12/21 1244, 45 mcg/kg/min at 05/12/21 1244    fentaNYL (PF) 1,500 mcg/30 mL (50 mcg/mL) infusion, 0-200 mcg/hr, IntraVENous, TITRATE, Santa Cheatham MD, Last Rate: 1.5 mL/hr at 05/12/21 1255, 75 mcg/hr at 05/12/21 1255    NOREPINephrine (LEVOPHED) 8 mg in 5% dextrose 250mL (32 mcg/mL) infusion, 0.5-16 mcg/min, IntraVENous, TITRATE, Rusty Womack MD, Stopped at 05/12/21 1212    Warfarin - Pharmacy to Dose, , Other, Rx Dosing/Monitoring, Jocelynn Walter Begun, NP    sodium chloride (NS) flush 5-40 mL, 5-40 mL, IntraVENous, Q8H, Braxton Triny B, NP, 10 mL at 05/12/21 0621    sodium chloride (NS) flush 5-40 mL, 5-40 mL, IntraVENous, PRN, Braxton Triny B, NP    heparin (porcine) 1,000 unit/mL injection 3,800 Units, 3,800 Units, Hemodialysis, DIALYSIS PRN, Reyes Fern, MD, 3,800 Units at 05/11/21 1926    nystatin (MYCOSTATIN) 100,000 unit/gram powder, , Topical, PRN, Rusty Womack MD    0.9% sodium chloride infusion, 11 mL/hr, IntraVENous, CONTINUOUS, Rusty MCGREGOR MD, Last Rate: 11 mL/hr at 05/12/21 0759, 11 mL/hr at 05/12/21 0759       A/P  S/P LVAD - good flows  Renal failure - HD  Urosepsis - Abx's  Need for anticoagulation - coumadin      Risk of morbidity and mortality - high  Medical decision making - high complexity    Signed By: Sulema Jose MD

## 2021-05-12 NOTE — CONSULTS
Palliative Medicine Consult  Charile: [phone] ([phone])    Patient Name: Lisa Cheney  YOB: 1950    Date of Initial Consult: May 12, 2021  Reason for Consult: Care decisions  Requesting Provider: Amy Reis  Primary Care Physician: Phyllis Cordero NP     SUMMARY:   Lisa Cheney is a 78 y.o. with a past history of heart failure, CAD, AICD, nonischemic cardiomyopathy, LVAD, ESRD started dialysis 4/26, COPD (trilogy), morbid obesity, DM, breast cancer, who was admitted on 5/11/2021 from rehab with a diagnosis of acute metabolic encephalopathy, acute on chronic heart failure. Current medical issues leading to Palliative Medicine involvement include: Support with care decisions. Patient acutely ill with multiple morbidities and high risk of compromise. Social: , one daughter, lives at home alone, daughter lives 2 hours away,  Worked at [address] [place] [address] before leaving to care for her ill . PALLIATIVE DIAGNOSES:   1. Shortness of breath  2. Encephalopathy  3. Feeding difficulties  4. Physical debility      PLAN:   1. Patient seen with daughter at the bedside. Patient intubated and sedated unable to provide any information  2. Daughter recalls meeting with me back in November along with the heart failure team  3. Daughter able to provide history since her last encounter  4. Daughter shared plans of moving her mother closer to her prior to this admission  5. She also provided her insight as to the events at [facility]  6. Daughter feels dialysis has been helping the patient  7. Daughter shared shared her mother's goals to continue aggressive management  8. We are hoping she will be extubated tomorrow enabling her to participate in a care goals discussion  9. We will follow-up  10. Initial consult note routed to primary continuity provider and/or primary health care team members  6.  Communicated plan of care with: [id number] Team     GOALS OF CARE / TREATMENT PREFERENCES:     GOALS OF CARE:  Patient/Health Care Proxy Stated Goals: Rehabilitation    TREATMENT PREFERENCES:   Code Status: Full Code    Advance Care Planning:  [] The [facility] Interdisciplinary Team has updated the ACP Navigator with Health Care Decision Maker and Patient Capacity      Primary Decision Maker: Ramez Crespo - [phone]  Advance Care Planning 4/22/2021   Patient's Healthcare Decision Maker is: -   Confirm Advance Directive Yes, not on file   Patient Would Like to Complete Advance Directive -       Medical Interventions: Full interventions     Other Instructions: Other:    As far as possible, the palliative care team has discussed with patient / health care proxy about goals of care / treatment preferences for patient.      HISTORY:     History obtained from: Chart, team, family    CHIEF COMPLAINT: Patient admitted with  forementioned history and issues    HPI/SUBJECTIVE:    The patient is:   [] Verbal and participatory  [x] Non-participatory due to:   Medical condition    Clinical Pain Assessment (nonverbal scale for severity on nonverbal patients):   Clinical Pain Assessment  Severity: 0     Activity (Movement): Lying quietly, normal position    Duration: for how long has pt been experiencing pain (e.g., 2 days, 1 month, years)  Frequency: how often pain is an issue (e.g., several times per day, once every few days, constant)     FUNCTIONAL ASSESSMENT:     Palliative Performance Scale (PPS):  PPS: 50       PSYCHOSOCIAL/SPIRITUAL SCREENING:     Palliative IDT has assessed this patient for cultural preferences / practices and a referral made as appropriate to needs (Cultural Services, Patient Advocacy, Ethics, etc.)    Any spiritual / Advent concerns:  [] Yes /  [x] No    Caregiver Burnout:  [] Yes /  [x] No /  [] No Caregiver Present      Anticipatory grief assessment:   [x] Normal  / [] Maladaptive       ESAS Anxiety: Anxiety: 0    ESAS Depression: REVIEW OF SYSTEMS:     Positive and pertinent negative findings in ROS are noted above in HPI. The following systems were [] reviewed / [x] unable to be reviewed as noted in HPI  Other findings are noted below. Systems: constitutional, ears/nose/mouth/throat, respiratory, gastrointestinal, genitourinary, musculoskeletal, integumentary, neurologic, psychiatric, endocrine. Positive findings noted below. Modified ESAS Completed by: provider     Drowsiness: 10     Pain: 0   Anxiety: 0     Anorexia: 10 Dyspnea: 0                    PHYSICAL EXAM:     From RN flowsheet:  Wt Readings from Last 3 Encounters:   05/13/21 228 lb 9.9 oz (103.7 kg)   05/06/21 246 lb 11.1 oz (111.9 kg)   04/15/21 271 lb (122.9 kg)     Blood pressure 100/80, pulse 93, temperature 97.8 °F (36.6 °C), temperature source Axillary, resp. rate 14, height 5' 7\" (1.702 m), weight 228 lb 9.9 oz (103.7 kg), SpO2 98 %.     Pain Scale 1: Adult Nonverbal Pain Scale  Pain Intensity 1: 0                 Last bowel movement, if known:     Constitutional: Ill-appearing, sedated  Eyes: Unable to assess  ENMT: no nasal discharge, moist mucous membranes  Cardiovascular: regular rhythm  Respiratory: breathing not labored on vent, symmetric  Gastrointestinal: Gross, soft non-tender, +bowel sounds  Musculoskeletal: no deformity, no tenderness to palpation  Skin:   Neurologic: Sedated, encephalopathic prior to sedation  Psychiatric: Sedated  Other:       HISTORY:     Active Problems:    Acute encephalopathy (5/11/2021)      Past Medical History:   Diagnosis Date    Asthma     Cancer ([facility] [address]     breast    Cancer ([facility]     endometrial    Congestive heart failure, unspecified     CRI (chronic renal insufficiency)     Depression     Diabetes ([facility] [facility]     Hypertension       Past Surgical History:   Procedure Laterality Date    HX HERNIA REPAIR      HX HYSTERECTOMY      HX MASTECTOMY      IR INSERT NON TUNL CVC OVER 5 YRS  4/26/2021    IR INSERT TUNL CVC W/O PORT OVER 5 YR  5/5/2021      No family history on file. History reviewed, no pertinent family history. Social History     Tobacco Use    Smoking status: Never Smoker    Smokeless tobacco: Never Used   Substance Use Topics    Alcohol use: Not Currently     Allergies   Allergen Reactions    Benzodiazepines Other (comments)     Respiratory issues      Current Facility-Administered Medications   Medication Dose Route Frequency    albumin human 25% (BUMINATE) solution 50 g  50 g IntraVENous DIALYSIS PRN    Vancomycin random level ~ 10 am  (prior to dialysis)   Other ONCE    warfarin (COUMADIN) tablet 4 mg  4 mg Oral ONCE    heparin (porcine) 1,000 unit/mL injection 1,900 Units  1,900 Units InterCATHeter DIALYSIS PRN    And    heparin (porcine) 1,000 unit/mL injection 1,900 Units  1,900 Units InterCATHeter DIALYSIS PRN    hydrALAZINE (APRESOLINE) 20 mg/mL injection 20 mg  20 mg IntraVENous Q6H PRN    hydrALAZINE (APRESOLINE) 20 mg/mL injection 10 mg  10 mg IntraVENous Q6H PRN    OTHER(NON-FORMULARY) 61 mg  61 mg Oral DAILY    . PHARMACY TO SUBSTITUTE PER PROTOCOL (Reordered from: ranolazine ER (RANEXA) 500 mg SR tablet)    Per Protocol    hydrALAZINE (APRESOLINE) tablet 100 mg  100 mg Oral TID    isosorbide mononitrate ER (IMDUR) tablet 30 mg  30 mg Oral DAILY    heparin 25,000 units in D5W 250 ml infusion  12-25 Units/kg/hr IntraVENous TITRATE    epoetin amalia-epbx (RETACRIT) injection 20,000 Units  20,000 Units SubCUTAneous Q TUE, THU & SAT    piperacillin-tazobactam (ZOSYN) 3.375 g in 0.9% sodium chloride (MBP/ADV) 100 mL MBP  3.375 g IntraVENous Q12H    Vancomycin Pharmacy Dosing   Other PRN    zinc oxide-cod liver oil (DESITIN) 40 % paste   Topical Q12H    sodium chloride (NS) flush 5-10 mL  5-10 mL IntraVENous PRN    sodium chloride (NS) flush 5-40 mL  5-40 mL IntraVENous Q8H    sodium chloride (NS) flush 5-40 mL  5-40 mL IntraVENous PRN    acetaminophen (TYLENOL) tablet 650 mg  650 mg Oral Q6H PRN    Or    acetaminophen (TYLENOL) suppository 650 mg  650 mg Rectal Q6H PRN    polyethylene glycol (MIRALAX) packet 17 g  17 g Oral DAILY PRN    albuterol-ipratropium (DUO-NEB) 2.5 MG-0.5 MG/3 ML  3 mL Nebulization Q6H PRN    methylPREDNISolone (PF) (SOLU-MEDROL) injection 40 mg  40 mg IntraVENous DAILY    famotidine (PF) (PEPCID) 20 mg in 0.9% sodium chloride 10 mL injection  20 mg IntraVENous DAILY    propofol (DIPRIVAN) 10 mg/mL infusion  0-50 mcg/kg/min IntraVENous TITRATE    fentaNYL (PF) 1,500 mcg/30 mL (50 mcg/mL) infusion  0-200 mcg/hr IntraVENous TITRATE    NOREPINephrine (LEVOPHED) 8 mg in 5% dextrose 250mL (32 mcg/mL) infusion  0.5-16 mcg/min IntraVENous TITRATE    Warfarin - Pharmacy to Dose   Other Rx Dosing/Monitoring    sodium chloride (NS) flush 5-40 mL  5-40 mL IntraVENous Q8H    sodium chloride (NS) flush 5-40 mL  5-40 mL IntraVENous PRN    nystatin (MYCOSTATIN) 100,000 unit/gram powder   Topical PRN    0.9% sodium chloride infusion  11 mL/hr IntraVENous CONTINUOUS          LAB AND IMAGING FINDINGS:     Lab Results   Component Value Date/Time    WBC 7.4 05/13/2021 03:17 AM    HGB 8.3 (L) 05/13/2021 03:17 AM    PLATELET 288 (L) 99/61/0075 03:17 AM     Lab Results   Component Value Date/Time    Sodium 137 05/13/2021 03:17 AM    Potassium 4.2 05/13/2021 03:17 AM    Chloride 103 05/13/2021 03:17 AM    CO2 21 05/13/2021 03:17 AM    BUN 45 (H) 05/13/2021 03:17 AM    Creatinine 4.78 (H) 05/13/2021 03:17 AM    Calcium 9.1 05/13/2021 03:17 AM    Magnesium 3.2 (H) 05/13/2021 03:17 AM    Phosphorus 6.0 (H) 05/13/2021 03:17 AM      Lab Results   Component Value Date/Time    Alk.  phosphatase 48 05/13/2021 03:17 AM    Protein, total 7.7 05/13/2021 03:17 AM    Albumin 3.4 (L) 05/13/2021 03:17 AM    Globulin 4.3 (H) 05/13/2021 03:17 AM     Lab Results   Component Value Date/Time    INR 1.4 (H) 05/13/2021 03:17 AM    Prothrombin time 14.5 (H) 05/13/2021 03:17 AM    aPTT 32.8 (H) 05/13/2021 03:17 AM      Lab Results   Component Value Date/Time    Iron 22 (L) 04/27/2021 05:17 AM    TIBC 213 (L) 04/27/2021 05:17 AM    Iron % saturation 10 (L) 04/27/2021 05:17 AM    Ferritin 150 04/27/2021 05:17 AM      Lab Results   Component Value Date/Time    pH 7.22 (LL) 05/11/2021 03:44 AM    PCO2 56 (H) 05/11/2021 03:44 AM    PO2 297 (H) 05/11/2021 03:44 AM     No components found for: [place]   Lab Results   Component Value Date/Time    CK 25 (L) 05/13/2021 03:17 AM                Total time: 70 minutes  Counseling / coordination time, spent as noted above: 60 minutes  > 50% counseling / coordination?:  Yes    Prolonged service was provided for  []30 min   []75 min in face to face time in the presence of the patient, spent as noted above. Time Start:   Time End:   Note: this can only be billed with 56608 (initial) or 59547 (follow up). If multiple start / stop times, list each separately.

## 2021-05-12 NOTE — PROGRESS NOTES
Visited Ms [facility] in [facility] for ongoing spiritual/emotional support. Ms Ara Bergman was resting with eyes closed and on vent support. No family present. Left note at bedside reassuring patient and family of ongoing  availability for support. : . Sean Hernandez.  Kristin Munroe; [facility], to contact [phone] call: [phone]

## 2021-05-12 NOTE — PROGRESS NOTES
Transitions of Care Plan:  RUR:  32% - high  Clinical Plan: intubated/sedated - SBT tomorrow; HD tomorrow  Consults: AHFC; Nephrology  Baseline: from [facility] for IPR; ambulates with RW; LVAD   Disposition: pending medical progress    Reason for Readmission:     Septic Shock         RUR Score/Risk Level:     32% - high    PCP: First and Last name:     Name of Practice:    Are you a current patient: Yes/No:    Approximate date of last visit:    Can you participate in a virtual visit with your PCP:     Is a Care Conference indicated:  No      Did you attend your follow up appointment (s): If not, why not:       Resources/supports as identified by patient/family:   Daughter       Top Challenges facing patient (as identified by patient/family and CM): Medical complexity - LVAD; HD dependent; debilitated    Finances/Medication cost?     No concerns  Transportation      ALS or BLS  Support system or lack thereof? Daughter  Living arrangements? Pt at [facility]; plan was to go to daughter's home in [facility] with OP HD set up once discharged from [facility]  Self-care/ADLs/Cognition? Dependent on assistance for self-care and ADLs (RW); unable to assess orientation at this time; baseline AOx4       Current Advanced Directive/Advance Care Plan:  Daughter is healthcare decision maker; no AMD on file           Plan for utilizing home health:   Pending medical progress             Transition of Care Plan:    Based on readmission, the patient's previous Plan of Care   has been evaluated and/or modified. The current Transition of Care Plan is:           PRESTON MCDONALD plan is currently pending medical progress.      Previous disposition plan:  [facility] for inpatient rehab; once medically stable for discharge from [facility], plan was for patient to go to her daughter's home in [facility] with home health ([address]) and OP HD ([facility] on [facility] [address]); LVAD coordinators were to provide education today for OP HD clinic (cancelled due to IP admission). Will reassess disposition plan as patient medically progresses. CM will continue to follow. Rain Townsend, MPH  Care Manager [facility]  Available via [employer] or  [employer]    Care Management Interventions  PCP Verified by CM:  Yes  Palliative Care Criteria Met (RRAT>21 & CHF Dx)?: Yes  Palliative Consult Recommended?: No  Reason Palliative Care Not Recommended?: Patient already received consult  Mode of Transport at Discharge: [address]: No  Reason Outside [facility]: Out of service area  [place] [address] [facility] [address] [place] [place]fali Davenport[place]: Yes  Discharge Durable Medical Equipment: No  Health Maintenance Reviewed: Yes  Physical Therapy Consult: No  Occupational Therapy Consult: No  Speech Therapy Consult: No  Current Support Network: Relative's Home(Daughter's Home)  Confirm Follow Up Transport: Family  Discharge Location  Discharge Placement: Other:(TBD)     Readmission Assessment  Number of days since last admission?: 1-7 days  Previous disposition: Acute Rehab  Who is being interviewed?: Caregiver  Did you visit your Primary Care Physician after you left the hospital, before you returned this time?: No  Why weren't you able to visit your PCP?: Other (Comment)(Inpatient rehab)  Did you see a specialist, such as Cardiac, Pulmonary, Orthopedic Physician, etc. after you left the hospital?: Yes(Inpatient Rehab)  Who advised the patient to return to the hospital?: Acute Rehab  Does the patient report anything that got in the way of taking their medications?: No

## 2021-05-12 NOTE — PROGRESS NOTES
[facility] VAD/Heart Failure Progress Note    Admit Date: 2021  POD:  * No surgery found *    Procedure:          Subjective: Intubated and sedated; abx's for urosepsis; good flows      Objective:   Vitals:  Blood pressure 100/80, pulse 75, temperature (!) 96.6 °F (35.9 °C), resp. rate 13, height 5' 7\" (1.702 m), weight 229 lb 0.9 oz (103.9 kg), SpO2 97 %.   Temp (24hrs), Av.1 °F (37.3 °C), Min:96.6 °F (35.9 °C), Max:100.2 °F (37.9 °C)    Hemodynamics:   CO:     CI:     CVP: CVP (mmHg): 13 mmHg (21 1200)   SVR:     PAP Systolic:     PAP Diastolic:     PVR:     WO77:     SCV02:      VAD Interrogation: LVAD (Heartmate)  Pump Speed (RPM): 9400  Pump Flow (LPM): 5.3  Chatter in Lines: No  PI (Pulsitility Index): 5.7  Power: 5.8  MAP: 70   Test: Yes  Back Up  at Bedside & Labeled: Yes  Power Module Test: Yes  Driveline Site Care: No  Driveline Dressing: Clean, Dry, and Intact    EKG/Rhythm:      Extubation Date / Time:     CT Output:     Ventilator:  Ventilator Volumes  Vt Set (ml): 450 ml (21 1108)  Vt Exhaled (Machine Breath) (ml): 471 ml (21 1108)  Ve Observed (l/min): 10.4 l/min (21 1108)    Oxygen Therapy:  Oxygen Therapy  O2 Sat (%): 97 % (21 1200)  Pulse via Oximetry: 109 beats per minute (21 0437)  O2 Device: Endotracheal tube;Ventilator (21 08)  Skin Assessment: Clean, dry, & intact (21 08)  Skin Protection for O2 Device: No (21 08)  O2 Flow Rate (L/min): 5 l/min (21 0231)  FIO2 (%): 50 % (21 1108)  ETCO2 (mmHg): 36 mmHg (21 0200)    CXR:    Admission Weight: Last Weight   Weight: 269 lb 10 oz (122.3 kg) Weight: 229 lb 0.9 oz (103.9 kg)     Intake / Output / Drain:  Current Shift:  0701 -  1900  In: 266.5 [I.V.:266.5]  Out: 40 [Urine:40]  Last 24 hrs.:     Intake/Output Summary (Last 24 hours) at 2021 1310  Last data filed at 2021 1100  Gross per 24 hour Intake 1500.8 ml   Output 1127 ml   Net 373.8 ml           Recent Labs     05/12/21 0223 05/11/21 0556 05/11/21 0217   CPK 61 72  --    TROIQ  --   --  0.09*     Recent Labs     05/12/21 0223 05/11/21 0556 05/11/21 0217    137 135*   K 3.8 4.0 4.3   CO2 22 24 26   BUN 27* 49* 47*   CREA 3.67* 6.51* 6.50*   * 172* 172*   PHOS 3.6 4.5  --    MG 2.8* 3.4*  --    WBC 11.4* 8.0 8.5   HGB 8.9* 10.5* 10.9*   HCT 29.9* 35.7 36.8    193 248     Recent Labs     05/12/21 0223 05/11/21 0556 05/11/21 0217   INR 1.9* 3.3* 3.4*   PTP 18.9* 33.0* 33.5*   APTT 36.5* 33.3*  --      No lab exists for component: PBNP        Current Facility-Administered Medications:     [START ON 5/13/2021] epoetin amalia-epbx (RETACRIT) injection 20,000 Units, 20,000 Units, SubCUTAneous, Q TUE, THU & SAT, Jaspreet Mendoza MD    piperacillin-tazobactam (ZOSYN) 3.375 g in 0.9% sodium chloride (MBP/ADV) 100 mL MBP, 3.375 g, IntraVENous, Q12H, Gavin MCGREGOR MD, Last Rate: 25 mL/hr at 05/12/21 0754, 3.375 g at 05/12/21 0754    Vancomycin Pharmacy Dosing, , Other, PRN, Gavin Morelos MD    vancomycin (VANCOCIN) 750 mg in 0.9% sodium chloride 250 mL (VIAL-MATE), 750 mg, IntraVENous, ONCE, Gavin MCGREGOR MD, 750 mg at 05/12/21 1200    warfarin (COUMADIN) tablet 2 mg, 2 mg, Oral, ONCE, Kasey Saunders MD    albumin human 5% (BUMINATE) solution 12.5 g, 12.5 g, IntraVENous, Q4H, Triny Limon, NP, 12.5 g at 05/12/21 1206    sodium chloride (NS) flush 5-10 mL, 5-10 mL, IntraVENous, PRN, Neida Gould MD    sodium chloride (NS) flush 5-40 mL, 5-40 mL, IntraVENous, Q8H, Nilson Acosta MD, 10 mL at 05/12/21 0622    sodium chloride (NS) flush 5-40 mL, 5-40 mL, IntraVENous, PRN, Nilson Acosta MD    acetaminophen (TYLENOL) tablet 650 mg, 650 mg, Oral, Q6H PRN **OR** acetaminophen (TYLENOL) suppository 650 mg, 650 mg, Rectal, Q6H PRN, Nilson Acosta MD    polyethylene glycol [facility]) packet 17 g, 17 g, Oral, DAILY PRN, Radha Horan MD    albuterol-ipratropium (DUO-NEB) 2.5 MG-0.5 MG/3 ML, 3 mL, Nebulization, Q6H PRN, Radha Horan MD    methylPREDNISolone (PF) (SOLU-MEDROL) injection 40 mg, 40 mg, IntraVENous, DAILY, Radha Horan MD, 40 mg at 05/12/21 0829    famotidine (PF) (PEPCID) 20 mg in 0.9% sodium chloride 10 mL injection, 20 mg, IntraVENous, DAILY, Radha Horan MD, 20 mg at 05/12/21 6426    propofol (DIPRIVAN) 10 mg/mL infusion, 0-50 mcg/kg/min, IntraVENous, TITRATE, Radha Horan MD, Last Rate: 33 mL/hr at 05/12/21 1244, 45 mcg/kg/min at 05/12/21 1244    fentaNYL (PF) 1,500 mcg/30 mL (50 mcg/mL) infusion, 0-200 mcg/hr, IntraVENous, TITRATE, Radha Horan MD, Last Rate: 1.5 mL/hr at 05/12/21 1255, 75 mcg/hr at 05/12/21 1255    NOREPINephrine (LEVOPHED) 8 mg in 5% dextrose 250mL (32 mcg/mL) infusion, 0.5-16 mcg/min, IntraVENous, TITRATE, Soraida Phan MD, Stopped at 05/12/21 1212    Warfarin - Pharmacy to Dose, , Other, Rx Dosing/Monitoring, Sarah Walter, NP    sodium chloride (NS) flush 5-40 mL, 5-40 mL, IntraVENous, Q8H, Triny Limon B, NP, 10 mL at 05/12/21 0621    sodium chloride (NS) flush 5-40 mL, 5-40 mL, IntraVENous, PRN, Everardo Limonin B, NP    heparin (porcine) 1,000 unit/mL injection 3,800 Units, 3,800 Units, Hemodialysis, DIALYSIS PRN, Lori Rojas MD, 3,800 Units at 05/11/21 1926    nystatin (MYCOSTATIN) 100,000 unit/gram powder, , Topical, PRN, Soraida Phan MD    0.9% sodium chloride infusion, 11 mL/hr, IntraVENous, CONTINUOUS, Soraida MCGREGOR MD, Last Rate: 11 mL/hr at 05/12/21 0759, 11 mL/hr at 05/12/21 0759    A/P  S/P LVAD - good flows  Renal failure - HD  Urosepsis - Abx's  Need for anticoagulation - coumadin     Risk of morbidity and mortality - high  Medical decision making - high complexity    Signed By: Shaina Huerta MD

## 2021-05-12 NOTE — PROGRESS NOTES
Physician Progress Note      Whit Samson Liner  CSN #:                  [id number]  :                       1950  ADMIT DATE:       2021 1:26 AM  DISCH DATE:  RESPONDING  PROVIDER #:        Alla Figueredo MD          QUERY TEXT:    Pt admitted with respiratory failure . Pt noted to have UTI and cardiology noting urosepsis  on . If possible, please document in the progress notes and discharge summary if you are evaluating and /or treating any of the following: The medical record reflects the following:  Risk Factors: UTI  Clinical Indicators:  -cardiology note  Likely urosepsis    -nephrology  Sepsis :  UTI :  follow up Cx results. abx per primary team  [facility] cx from [facility] : pending    2021 02:23  WBC: 11.4 (H)    2021 05:56  NEUTROPHILS: 79 (H)    2021 02:23  NEUTROPHILS: 87 (H)    2021 02:17  Lactic acid: 2.2 (HH)    admission VS  Pulse 99  Temp (!) 101 ? F (38.3 ? C)  Resp 18  SpO2 98%    ED  Sepsis with acute hypoxic respiratory failure without septic shock, due to unspecified organism [facility])    Treatment: critical care monitoring, vancomycin IV, levaquin IV, 500 cc fluid bolus, levophed      Thank you,  Pedro Hooks RN, CDI, [facility], CCDS  Certified Clinical   [phone]  Options provided:  -- Sepsis, present on admission  -- Sepsis, present on admission with shock  -- Sepsis, present on admission due to permacath  -- Sepsis, present on admission due to UTI  -- Sepsis, not present on admission  -- No Sepsis, UTI only  -- Sepsis was ruled out  -- Other - I will add my own diagnosis  -- Disagree - Not applicable / Not valid  -- Disagree - Clinically unable to determine / Unknown  -- Refer to Clinical Documentation Reviewer    PROVIDER RESPONSE TEXT:    This patient has UTI only, patient is not septic.     Query created by: Kristina Hamm on 2021 10:50 AM      Electronically signed by: Yamini Monroe MD 5/12/2021 5:51 PM

## 2021-05-12 NOTE — PROGRESS NOTES
Bedside and Verbal shift change report given to Erick South (oncoming nurse) by Nellie Echavarria RN (offgoing nurse). Report included the following information SBAR    2000 Turned patient. Has a  ~ quarter size round blanchable area on sacrum. Applied foam dressing. 2325 Called to inquire why urine culture still on hold. Per lab, urine culture order has not been placed and tube is still within timeframe to order. Placed culture order. 0148 8 beat run of VT self terminating. 0210 Multiple short bursts of VT. Labs drawn for AM labs. 0430 LVAD drive line changed under sterile technique. Wound culture obtained. 0440 ABG:   Vent changes by RT went to FiO2 50% (from 40%) and Rate from 24 to 22. Lab Results   Component Value Date/Time    PHI 7.48 (H) 05/12/2021 04:39 AM    PCO2I 32.0 (L) 05/12/2021 04:39 AM    PO2I 65 (L) 05/12/2021 04:39 AM    HCO3I 23.9 05/12/2021 04:39 AM    FIO2I 40 05/12/2021 04:39 AM     0700 Informed Dr. Lou Moreno, patient rhythm throughout night varied from AV paced to V paced to burst of VT.      0800 Bedside and Verbal shift change report given to Ivan Ortiz (oncoming nurse) by Ryanne Castañeda (offgoing nurse). Report included the following information SBAR, Kardex, Procedure Summary, Intake/Output, MAR, Recent Results, Med Rec Status and Cardiac Rhythm AV paced/Vpaced and bursts of VT. Currently AV paced. Prudencio Jose

## 2021-05-12 NOTE — PROGRESS NOTES
[facility]   [address] [facility], [address] [place] [address], [facility]  Phone: [phone]   [phone]       Nephrology Progress Note  Estella Oneill     1950     [id number]  Date of Admission : 5/11/2021 05/12/21    CC: Follow up for ARF    Assessment and Plan   ROCIO on CKD:  - 2/2 progressive CKD and CRS  - dialysis initiated 4/26   - tolerated HD   - next HD tomorrow     Sepsis :  UTI :  follow up Cx results. abx per primary team   Blood cx from Permacath : pending      Acute Hypoxic Resp failure :  - baseline severe COPD + chronic CHF + morbid obesity/ JED   - on vent   - per [facility]     Chronic systolic CHF, stage C NYHA class IV  Combined ischemic and nonischemic cardiomyopathy  S/p HM 2 LVAD implant 4/15/2017 by Dr. Miki Asher at [facility]  hx of recurrent VT : off mexiletine  chronic RV failure. 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 - consult AHF team      Chronic anemia :   ATTR amyloidosis  - anemia of chronic disease   - continue HEIKE  MWF     Severe COPD. Pulmonary hypertension. Chronic A. fib. History of endometrial Ca       Interval History:  Seen and examined  Tolerated HD  On levophed   On fentanyl and propofol for sedation   URINE :  110 CC. ua +VE      Review of Systems: Review of systems not obtained due to patient factors.     Current Medications:   Current Facility-Administered Medications   Medication Dose Route Frequency    sodium chloride (NS) flush 5-10 mL  5-10 mL IntraVENous PRN    sodium chloride (NS) flush 5-40 mL  5-40 mL IntraVENous Q8H    sodium chloride (NS) flush 5-40 mL  5-40 mL IntraVENous PRN    acetaminophen (TYLENOL) tablet 650 mg  650 mg Oral Q6H PRN    Or    acetaminophen (TYLENOL) suppository 650 mg  650 mg Rectal Q6H PRN    polyethylene glycol (MIRALAX) packet 17 g  17 g Oral DAILY PRN    albuterol-ipratropium (DUO-NEB) 2.5 MG-0.5 MG/3 ML  3 mL Nebulization Q6H PRN    methylPREDNISolone (PF) (SOLU-MEDROL) injection 40 mg  40 mg IntraVENous DAILY    famotidine (PF) (PEPCID) 20 mg in 0.9% sodium chloride 10 mL injection  20 mg IntraVENous DAILY    propofol (DIPRIVAN) 10 mg/mL infusion  0-50 mcg/kg/min IntraVENous TITRATE    fentaNYL (PF) 1,500 mcg/30 mL (50 mcg/mL) infusion  0-200 mcg/hr IntraVENous TITRATE    NOREPINephrine (LEVOPHED) 8 mg in 5% dextrose 250mL (32 mcg/mL) infusion  0.5-16 mcg/min IntraVENous TITRATE    Warfarin - Pharmacy to Dose   Other Rx Dosing/Monitoring    sodium chloride (NS) flush 5-40 mL  5-40 mL IntraVENous Q8H    sodium chloride (NS) flush 5-40 mL  5-40 mL IntraVENous PRN    heparin (porcine) 1,000 unit/mL injection 3,800 Units  3,800 Units Hemodialysis DIALYSIS PRN    nystatin (MYCOSTATIN) 100,000 unit/gram powder   Topical PRN    0.9% sodium chloride infusion  11 mL/hr IntraVENous CONTINUOUS      Allergies   Allergen Reactions    Benzodiazepines Other (comments)     Respiratory issues       Objective:  Vitals:    Vitals:    05/12/21 0300 05/12/21 0400 05/12/21 0500 05/12/21 0600   BP:       Pulse: 75 72 71 73   Resp: 24 24 22 22   Temp:  99.5 °F (37.5 °C)     SpO2: 94% 93% 93% 98%   Weight:       Height:         Intake and Output:  05/11 1901 - 05/12 0700  In: 674.4 [I.V.:674.4]  Out: 4761 [Urine:47]  05/10 0701 - 05/11 1900  In: 479.2 [I.V.:479.2]  Out: 70 [Urine:70]    Physical Examination:  Pt intubated    Yes  General: Obese   HEENT            Pupils reactive   Neck:  permacath +  Resp:  Lungs CTA B/L, no wheezing   CV:  RRR,  no murmur or rub,no LE edema  GI:  Soft, NT, + BS  Neurologic:  Sedated   Psych:             Unable to assess      []    High complexity decision making was performed  []    Patient is at high-risk of decompensation with multiple organ involvement    Lab Data Personally Reviewed: I have reviewed all the pertinent labs, microbiology data and radiology studies during assessment.     Recent Labs     05/12/21  0223 05/11/21  0556 05/11/21  0217    137 135*   K 3.8 4.0 4.3  103 99   CO2 22 24 26   * 172* 172*   BUN 27* 49* 47*   CREA 3.67* 6.51* 6.50*   CA 9.1 9.3 10.0   MG 2.8* 3.4*  --    PHOS 3.6 4.5  --    ALB 3.6 3.9 4.2   ALT 8* 10* 12   INR 1.9* 3.3* 3.4*     Recent Labs     05/12/21  0223 05/11/21  0556 05/11/21  0217   WBC 11.4* 8.0 8.5   HGB 8.9* 10.5* 10.9*   HCT 29.9* 35.7 36.8    193 248     No results found for: SDES  Lab Results   Component Value Date/Time    Culture result: NO GROWTH AFTER 11 HOURS 05/11/2021 04:30 PM    Culture result: NO GROWTH AFTER 23 HOURS 05/11/2021 02:27 AM    Culture result: NO GROWTH 5 DAYS 04/23/2021 06:51 PM    Culture result: NO GROWTH 5 DAYS 11/10/2020 06:42 PM    Culture result: No Growth (<1000 cfu/mL) 11/09/2020 12:05 AM     Recent Results (from the past 24 hour(s))   EKG, 12 LEAD, INITIAL    Collection Time: 05/11/21  6:38 AM   Result Value Ref Range    Ventricular Rate 98 BPM    Atrial Rate 98 BPM    QRS Duration 162 ms    Q-T Interval 442 ms    QTC Calculation (Bezet) 564 ms    Calculated R Axis -78 degrees    Calculated T Axis 91 degrees    Diagnosis       Ventricular-paced rhythm  When compared with ECG of 11-MAY-2021 01:52,  No significant change    Confirmed by Sola Adhikari M.D., Marek Zapata ([id number]) on 5/11/2021 7:59:23 PM     ECHO ADULT FOLLOW-UP OR LIMITED    Collection Time: 05/11/21  1:28 PM   Result Value Ref Range    IVSd 0.98 (A) 0.60 - 0.90 cm    LVIDd 5.96 (A) 3.90 - 5.30 cm    LVIDs 5.49 cm    LVPWd 1.07 (A) 0.60 - 0.90 cm    RVIDd 4.14 cm    RVSP 40.62 mmHg    Left Atrium Major Axis 3.22 cm    Est. RA Pressure 8.00 mmHg    AoV PG 1.24 mmHg    Aortic Valve Systolic Peak Velocity 11.96 cm/s    Pulmonic Valve Systolic Peak Instantaneous Gradient 2.20 mmHg    Tapse 0.99 (A) 1.50 - 2.00 cm    Triscuspid Valve Regurgitation Peak Gradient 32.62 mmHg    TR Max Velocity 285.57 cm/s    Ao Root D 3.65 cm    LV Mass .0 67.0 - 162.0 g    LV Mass AL Index 109.9 43.0 - 95.0 g/m2    Left Atrium Minor Axis 1.40 cm CULTURE, BLOOD, LINE    Collection Time: 05/11/21  4:30 PM    Specimen: Blood   Result Value Ref Range    Special Requests: NO SPECIAL REQUESTS      Culture result: NO GROWTH AFTER 11 HOURS     SAMPLES BEING HELD    Collection Time: 05/11/21  5:00 PM   Result Value Ref Range    SAMPLES BEING HELD 1blu,1lav     COMMENT        Add-on orders for these samples will be processed based on acceptable specimen integrity and analyte stability, which may vary by analyte. CBC WITH AUTOMATED DIFF    Collection Time: 05/12/21  2:23 AM   Result Value Ref Range    WBC 11.4 (H) 3.6 - 11.0 K/uL    RBC 3.29 (L) 3.80 - 5.20 M/uL    HGB 8.9 (L) 11.5 - 16.0 g/dL    HCT 29.9 (L) 35.0 - 47.0 %    MCV 90.9 80.0 - 99.0 FL    MCH 27.1 26.0 - 34.0 PG    MCHC 29.8 (L) 30.0 - 36.5 g/dL    RDW 20.9 (H) 11.5 - 14.5 %    PLATELET 648 654 - 991 K/uL    MPV 9.5 8.9 - 12.9 FL    NRBC 0.0 0  WBC    ABSOLUTE NRBC 0.00 0.00 - 0.01 K/uL    NEUTROPHILS 87 (H) 32 - 75 %    LYMPHOCYTES 7 (L) 12 - 49 %    MONOCYTES 5 5 - 13 %    EOSINOPHILS 0 0 - 7 %    BASOPHILS 0 0 - 1 %    IMMATURE GRANULOCYTES 1 (H) 0.0 - 0.5 %    ABS. NEUTROPHILS 9.9 (H) 1.8 - 8.0 K/UL    ABS. LYMPHOCYTES 0.8 0.8 - 3.5 K/UL    ABS. MONOCYTES 0.6 0.0 - 1.0 K/UL    ABS. EOSINOPHILS 0.0 0.0 - 0.4 K/UL    ABS. BASOPHILS 0.0 0.0 - 0.1 K/UL    ABS. IMM.  GRANS. 0.1 (H) 0.00 - 0.04 K/UL    DF SMEAR SCANNED      RBC COMMENTS ANISOCYTOSIS  2+        RBC COMMENTS OVALOCYTES  1+        RBC COMMENTS POLYCHROMASIA  PRESENT       CK    Collection Time: 05/12/21  2:23 AM   Result Value Ref Range    CK 61 26 - 548 U/L   METABOLIC PANEL, COMPREHENSIVE    Collection Time: 05/12/21  2:23 AM   Result Value Ref Range    Sodium 138 136 - 145 mmol/L    Potassium 3.8 3.5 - 5.1 mmol/L    Chloride 104 97 - 108 mmol/L    CO2 22 21 - 32 mmol/L    Anion gap 12 5 - 15 mmol/L    Glucose 104 (H) 65 - 100 mg/dL    BUN 27 (H) 6 - 20 MG/DL    Creatinine 3.67 (H) 0.55 - 1.02 MG/DL    BUN/Creatinine ratio 7 (L) 12 - 20      GFR est AA 15 (L) >60 ml/min/1.73m2    GFR est non-AA 12 (L) >60 ml/min/1.73m2    Calcium 9.1 8.5 - 10.1 MG/DL    Bilirubin, total 0.8 0.2 - 1.0 MG/DL    ALT (SGPT) 8 (L) 12 - 78 U/L    AST (SGOT) 25 15 - 37 U/L    Alk. phosphatase 49 45 - 117 U/L    Protein, total 7.9 6.4 - 8.2 g/dL    Albumin 3.6 3.5 - 5.0 g/dL    Globulin 4.3 (H) 2.0 - 4.0 g/dL    A-G Ratio 0.8 (L) 1.1 - 2.2     LACTIC ACID    Collection Time: 05/12/21  2:23 AM   Result Value Ref Range    Lactic acid 0.9 0.4 - 2.0 MMOL/L   MAGNESIUM    Collection Time: 05/12/21  2:23 AM   Result Value Ref Range    Magnesium 2.8 (H) 1.6 - 2.4 mg/dL   PHOSPHORUS    Collection Time: 05/12/21  2:23 AM   Result Value Ref Range    Phosphorus 3.6 2.6 - 4.7 MG/DL   PROTHROMBIN TIME + INR    Collection Time: 05/12/21  2:23 AM   Result Value Ref Range    INR 1.9 (H) 0.9 - 1.1      Prothrombin time 18.9 (H) 9.0 - 11.1 sec   MIXING STUDY, APTT    Collection Time: 05/12/21  2:23 AM   Result Value Ref Range    aPTT mixing study          Mixing study interpretation PENDING     aPTT 36.5 (H) 22.1 - 31.0 sec    aPTT 1:1 mix patient PENDING sec    aPTT incubated patient PENDING sec   PROCALCITONIN    Collection Time: 05/12/21  2:23 AM   Result Value Ref Range    Procalcitonin 14.94 ng/mL   LD    Collection Time: 05/12/21  2:23 AM   Result Value Ref Range     81 - 246 U/L   POC EG7    Collection Time: 05/12/21  4:39 AM   Result Value Ref Range    Calcium, ionized (POC) 1.18 1.12 - 1.32 mmol/L    FIO2 (POC) 40 %    pH (POC) 7.48 (H) 7.35 - 7.45      pCO2 (POC) 32.0 (L) 35.0 - 45.0 MMHG    pO2 (POC) 65 (L) 80 - 100 MMHG    HCO3 (POC) 23.9 22 - 26 MMOL/L    Base excess (POC) 0 mmol/L    sO2 (POC) 94 92 - 97 %    Site DRAWN FROM ARTERIAL LINE      Device: VENT      Mode ASSIST CONTROL      Tidal volume 450 ml    Set Rate 24 bpm    PEEP/CPAP (POC) 8 cmH2O    Allens test (POC) N/A      Specimen type (POC) ARTERIAL             Total time spent with patient:  xxx   min. Care Plan discussed with:  Patient     Family      RN      Consulting Physician [id number],         I have reviewed the flowsheets. Chart and Pertinent Notes have been reviewed. No change in PMH ,family and social history from Consult note.       Beatris Langley MD

## 2021-05-12 NOTE — PROGRESS NOTES
Pharmacist Note  Warfarin Dosing  Consult provided for this 78 y. o.female to manage warfarin for LVAD    INR Goal: 1.8-2.5    Home regimen/ tablet size: 5 mg daily    Drugs that may increase INR: None  Drugs that may decrease INR: None  Other current anticoagulants/ drugs that may increase bleeding risk: None  Risk factors: Age > 65  Daily INR ordered: YES    Recent Labs     05/12/21  0223 05/11/21  0556 05/11/21  0217   HGB 8.9* 10.5* 10.9*   INR 1.9* 3.3* 3.4*     Date               INR                  Dose  5/11  3.3  hold    5/12               1.9                    2 mg                                                                            Assessment/ Plan: Will order  warfarin 2 mg today     Pharmacy will continue to monitor daily and adjust therapy as indicated. Please contact the pharmacist at e [phone] or  for outpatient recommendations if needed.

## 2021-05-12 NOTE — PROGRESS NOTES
Day # 2  of Vancomycin  Indication:  sepsis  Current regimen: Vancomycin 2 grams on  @ 0400  Abx regimen:  zosyn+vancomycin  Inpatient dialysis schedule: Tuesday/Thursday/Saturday    Recent Labs     21  0556 21  0217   WBC 11.4* 8.0 8.5   CREA 3.67* 6.51* 6.50*   BUN 27* 49* 47*     Est CrCl: ESRD on HD  Temp (24hrs), Av.9 °F (37.7 °C), Min:99.5 °F (37.5 °C), Max:100.2 °F (37.9 °C)    Cultures:    blood in process   urine NGTD   wound in process    Goal trough = 20 - 25 mcg/mL for therapeutic goal of 15 - 20 mcg/mL (assuming ~35% removal by dialysis)    Date                Dialysis (Yes/No)       Pre-HD Level              Dose      Yes   -                       2000 mg @ 0400                               No                              Plan:  Will order vancomycin 750 mg after each HD

## 2021-05-12 NOTE — WOUND CARE
WOCN Note:     New consult placed for assessment of buttocks, sacrum and bilateral low legs. Assessed in room cvi 35 with Melinda CAUSEY. Chart reviewed. Admitted DX:  Acute encephalopathy  Past Medical History:   Diagnosis Date    Asthma     Cancer ([facility] [address]     breast    Cancer ([facility] [address]     endometrial    Congestive heart failure, unspecified     CRI (chronic renal insufficiency)     Depression     Diabetes (HCC)     Hypertension        Assessment:   Patient is intubated, sedated and requires assist of 2 with repositioning. Bed:  [facility]  Patient has a Fletcher. Patient repositioned on right side with pillow. Heels offloaded with pillows. Heels intact without erythema. 1.  Sacrum and buttocks intact with resurfaced pink scarring. Applied sacral foam dressing. 2.  Bilateral low legs dry scabs. 3.  Groin and abdominal folds with partial thickness wounds from MASD. Wound, Pressure Prevention & Skin Care Recommendations:    1. Minimize layers of linen/pads under patient to optimize support surface. 2.  Turn/reposition approximately every 2 hours and offload heels. 3.  Manage moisture/ Keep skin folds clean and dry. 4.  Groin and abdominal folds:  Cleanse, dry and apply Desitin Zinc cream.    Discussed above plan with Melinda CAUSEY.     Transition of Care: Plan to follow as needed while admitted to hospital.    ANGY Hester RN [facility] [place] [facility]  Available on Perfect Serve  Pager [phone]  Office [phone]

## 2021-05-12 NOTE — PROGRESS NOTES
[facility] in [place], [place]  Inpatient Consult Progress Note      Patient name: Manuel Cartwright  Patient : 1950  Patient MRN: [id number]  Consulting MD: Chico Shields MD  Date of service: 21    CHIEF COMPLAINT:  Chief Complaint   Patient presents with    Altered mental status        PLAN:  Continue set speed 9400rpm, low speed 9000rpm   Wean pressors for MAP > 65  Albumin 250ml x 2 doses again today  Keep CVP 10-12 goal   Hold hydralazine/Imdur due to hypotension  Hold Tafamidis for now- can resume when able to take PO  Intolerant to BB/ACEi/ARB/ARNI due to aTTR  Intolerant to MRA due to hyperkalemia  Volume management per Nephrology; strict I/O, standing weights  Tolerating HD, plan for tomorrow   Vent management per Intensivist- SBTs tomorrow   Sedation management per intensivist  Antibiotic regimen per intensivist   Blood cultures, wound culture and Urine cx pending   Coumadin dose per pharmacy; goal INR lowered to 1.8-2.5 due to GIB  Antibiotics for chronic sternal wound infection- continue Keflex po when appropriate  Cont to hold synthroid for now, recheck TSH before discharge   Bowel regimen  PT/OT when appropriate   Up to date on flu, PNA, and COVID vaccinations      IMPRESSION:  Likely urosepsis  Acute respiratory failure   Chronic RV failure  Coronary artery disease  · [facility] (2016) high grade ramus and small PDA disease, borderline disease of LAD and takeoff of pRCA  Chronic systolic heart failure  · Stage C, NYHA class IV improved to IIIA symptoms with LVAD  · Combined ischemic and non-ischemic cardiomyopathy, 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 Arias Amaya)  · C/b delayed extubation due to severe COPD  · C/b critical illness polyneuropathy  · C/b prolonged hospitalization post-LVAD, 55 days  · C/b sternal wound infection s/p debridement (by Dr. Bc Saldana) s/p wound vac  · C/b sacral ulcer  · Would culture positive for Staph aureus, not MRSA  · C/b LVAD site drainage, improved  S/p CRT-ICD  · ICD fired due to electrolyte imbalance (2011)  H/o breast cancer (1992)   · s/p bilateral mastectomy/chemo and endometrial cancer s/p hysterectomy  · Lymphedema of LLE due to cancer treatment  Severe COPD with FEV1 50%  Depression  Atrial fibrillation  H/o \"two mini strokes\"  S/p fall with hip facture   · Right hip hemmiarthroplasty (5/23/18) by Dr. Gale Collins)  · S/p removed hip hardwarare due to pain (4/15/19)  COPD severe  CKD, stage 4- on HD  Hyperkalemia, resolved  Pulmonary hypertension  Cardiac risk factors:  · Morbid obesity, Body mass index is 42.29 kg/m². · DM2 insulin dependent  · JED on Trilogy  · HL  Urinary incontinence, severe  · no procedures due to anticoagulation  Endometrial cancer (2002)  HTN  HL     CARDIAC IMAGING:  TTE 5/11/21 LVEF < 15%, LVIDd 5.96cm, TAPSE 0.99cm, RVIDd 4.14cm  TTE 4/19 shows improved LVIDd, 6.68 cm with RVIDd 5.14 cm and TAPSE 1.1 cm   TTE 4/28/21 LVIDd 7.37cm, RVIDd 5.27cm, TAPSE 1.44cm   Echo (9/24/19) LVEF 20-25%, AV opens 1:1, no AR  Echo (5/29/18) LVEF 10-%, ramps study done, report in [facility]  Echo (1/9/18) ramp study done, LVEDD 7.1cm  LHC (11/9/18) 2 vessel disease with 90% OM, 80% PDA, DSA to PDA branch  RHC 4/20 showed adequate Sal CI 3.0 but severely elevated RA pressure 24 mmHg  CXR negative 4/27/21     INTERVAL HISTORY:  Ms. Art Ovalle is a 78 y.o. Female s/p LVAD implant with HM 2 and ESRD requiring HD that was recently admitted for RV failure and worsening renal function. She was discharged on 5/5/21 to [address] [place] [address] for rehabilitation before transitioning home to live her with daughter. In the last few days she had worsening mental status changed, decreased UOP and respiratory distress after possible aspiration- she was intubated and admitted to [facility] for further management.      LVAD INTERROGATION:  Device interrogated in person  Device function normal, normal flow, no events  LVAD   Pump Speed (RPM): 9400  Pump Flow (LPM): 5  MAP: 78  PI (Pulsitility Index): 5.2  Power: 5.8   Test: No  Back Up  at Bedside & Labeled: Yes  Power Module Test: No  Driveline Site Care: Yes  Driveline Dressing: Changed per order  Outpatient: No  MAP in Therapeutic Range (Outpatient): Yes  Testing  Alarms Reviewed: Yes  Back up SC speed: 9400  Back up Low Speed Limit: 9000  Emergency Equipment with Patient?: Yes  Emergency procedures reviewed?: Yes  Drive line site inspected?: No  Drive line intergrity inspected?: Yes  Drive line dressing changed?: No    PHYSICAL EXAM:  Visit Vitals  /80 Comment: treatment ended. Blood returned   Pulse 78   Temp 97.9 °F (36.6 °C)   Resp 18   Ht 5' 7\" (1.702 m)   Wt 229 lb 0.9 oz (103.9 kg)   SpO2 94%   BMI 35.88 kg/m²     General: Patient is well developed, well-nourished in no acute distress, sedated   HEENT: Normocephalic and atraumatic. No scleral icterus. Pupils are equal, round and reactive to light and accomodation. No conjunctival injection. Oropharynx is clear. Neck: Supple. No evidence of thyroid enlargements or lymphadenopathy. JVD: Cannot be appreciated   Lungs: Breath sounds are equal and clear bilaterally. No wheezes, rhonchi, or rales. On Vent  Heart: Regular rate and rhythm with normal S1 and S2. No murmurs, gallops or rubs. Abdomen: Soft, no mass or tenderness. No organomegaly or hernia. Bowel sounds present. Genitourinary and rectal: deferred  Extremities: No cyanosis, clubbing, or edema. Neurologic: No focal sensory or motor deficits are noted. Grossly intact. Psychiatric: Awake, alert an doriented x 3. Appropriate mood and affect. Skin: Warm, dry and well perfused. No lesions, nodules or rashes are noted.     REVIEW OF SYSTEMS:  Review of Systems   Unable to perform ROS: Acuity of condition         PAST MEDICAL HISTORY:  Past Medical History:   Diagnosis Date    Asthma     Cancer ([facility]     breast    Cancer ([facility]     endometrial    Congestive heart failure, unspecified     CRI (chronic renal insufficiency)     Depression     Diabetes (HCC)     Hypertension        PAST SURGICAL HISTORY:  Past Surgical History:   Procedure Laterality Date    HX HERNIA REPAIR      HX HYSTERECTOMY      HX MASTECTOMY      IR INSERT NON TUNL CVC OVER 5 YRS  4/26/2021    IR INSERT TUNL CVC W/O PORT OVER 5 YR  5/5/2021       FAMILY HISTORY:  No family history on file. SOCIAL HISTORY:  Social History     Socioeconomic History    Marital status:      Spouse name: Not on file    Number of children: Not on file    Years of education: Not on file    Highest education level: Not on file   Tobacco Use    Smoking status: Never Smoker    Smokeless tobacco: Never Used   Substance and Sexual Activity    Alcohol use: Not Currently       LABORATORY RESULTS:     Labs Latest Ref Rng & Units 5/12/2021 5/11/2021 5/11/2021 5/6/2021 5/5/2021 5/4/2021 5/3/2021   WBC 3.6 - 11.0 K/uL 11. 4(H) 8.0 8.5 3.7 3. 2(L) 3.0(L) 3.4(L)   RBC 3.80 - 5.20 M/uL 3.29(L) 3.89 4.06 3.37(L) 3.48(L) 3.49(L) 3.37(L)   Hemoglobin 11.5 - 16.0 g/dL 8.9(L) 10. 5(L) 10. 9(L) 9.3(L) 9.1(L) 9.3(L) 8.7(L)   Hematocrit 35.0 - 47.0 % 29. 9(L) 35.7 36.8 31. 1(L) 31. 9(L) 31. 5(L) 30. 2(L)   MCV 80.0 - 99.0 FL 90.9 91.8 90.6 92.3 91.7 90.3 89.6   Platelets 856 - 154 K/uL 167 193 248 160 179 169 160   Lymphocytes 12 - 49 % 7(L) 11(L) 15 18 17 24 13   Monocytes 5 - 13 % 5 8 11 10 11 13 10   Eosinophils 0 - 7 % 0 0 0 0 0 0 0   Basophils 0 - 1 % 0 1 1 1 1 0 0   Bands 0 - 6 % - - - - - - -   Albumin 3.5 - 5.0 g/dL 3.6 3.9 4.2 3.5 3. 4(L) 3. 2(L) 3.4(L)   Calcium 8.5 - 10.1 MG/DL 9.1 9.3 10.0 9.0 9.1 9.0 9.0   Glucose 65 - 100 mg/dL 104(H) 172(H) 172(H) 136(H) 107(H) 83 92   BUN 6 - 20 MG/DL 27(H) 49(H) 47(H) 42(H) 34(H) 23(H) 39(H)   Creatinine 0.55 - 1.02 MG/DL 3.67(H) 6.51(H) 6.50(H) 3.91(H) 3.47(H) 2.50(H) 3.51(H)   Sodium 136 - 145 mmol/L 138 137 135(L) 137 138 137 136   Potassium 3.5 - 5.1 mmol/L 3.8 4.0 4.3 4.6 4.3 4.1 4.2   TSH 0.36 - 3.74 uIU/mL - - - - - - -   LDH 81 - 246 U/L 194 - 402(H) 231 215 204 189   Some recent data might be hidden     Lab Results   Component Value Date/Time    TSH 5.36 (H) 04/24/2021 04:32 AM    TSH 3.99 (H) 04/17/2021 04:54 AM    TSH 2.980 10/01/2020 12:00 AM       ALLERGY:  Allergies   Allergen Reactions    Benzodiazepines Other (comments)     Respiratory issues        CURRENT MEDICATIONS:    Current Facility-Administered Medications:     [START ON 5/13/2021] epoetin amalia-epbx (RETACRIT) injection 20,000 Units, 20,000 Units, SubCUTAneous, Q TUE, THU & SATReji Srikanth R, MD    piperacillin-tazobactam (ZOSYN) 3.375 g in 0.9% sodium chloride (MBP/ADV) 100 mL MBP, 3.375 g, IntraVENous, Q12H, Soraida MCGREGOR MD, Last Rate: 25 mL/hr at 05/12/21 0754, 3.375 g at 05/12/21 0754    Vancomycin Pharmacy Dosing, , Other, PRN, Soraida Phan MD    vancomycin (VANCOCIN) 750 mg in 0.9% sodium chloride 250 mL (VIAL-MATE), 750 mg, IntraVENous, ONCE, Hulon Saint, Keri Bjork, MD    warfarin (COUMADIN) tablet 2 mg, 2 mg, Oral, ONCE, Martine Vallecillo MD    sodium chloride (NS) flush 5-10 mL, 5-10 mL, IntraVENous, PRN, Shawn Fortune MD    sodium chloride (NS) flush 5-40 mL, 5-40 mL, IntraVENous, Q8H, Radha Horan MD, 10 mL at 05/12/21 0622    sodium chloride (NS) flush 5-40 mL, 5-40 mL, IntraVENous, PRN, Radha Horan MD    acetaminophen (TYLENOL) tablet 650 mg, 650 mg, Oral, Q6H PRN **OR** acetaminophen (TYLENOL) suppository 650 mg, 650 mg, Rectal, Q6H PRN, Radha Horan MD    polyethylene glycol [facility]) packet 17 g, 17 g, Oral, DAILY PRN, Radha Horan MD    albuterol-ipratropium (DUO-NEB) 2.5 MG-0.5 MG/3 ML, 3 mL, Nebulization, Q6H PRN, Radha Horan MD    methylPREDNISolone (PF) (SOLU-MEDROL) injection 40 mg, 40 mg, IntraVENous, DAILY, Sun, Faith Noriega MD, 40 mg at 05/12/21 0829    famotidine (PF) (PEPCID) 20 mg in 0.9% sodium chloride 10 mL injection, 20 mg, IntraVENous, DAILY, Kali Boone MD, 20 mg at 05/12/21 6161    propofol (DIPRIVAN) 10 mg/mL infusion, 0-50 mcg/kg/min, IntraVENous, TITRATE, Kali Boone MD, Last Rate: 25.7 mL/hr at 05/12/21 0759, 35 mcg/kg/min at 05/12/21 0759    fentaNYL (PF) 1,500 mcg/30 mL (50 mcg/mL) infusion, 0-200 mcg/hr, IntraVENous, TITRATE, Kali Boone MD, Last Rate: 1 mL/hr at 05/12/21 0802, 50 mcg/hr at 05/12/21 0802    NOREPINephrine (LEVOPHED) 8 mg in 5% dextrose 250mL (32 mcg/mL) infusion, 0.5-16 mcg/min, IntraVENous, TITRATE, Avila MCGREGOR MD, Last Rate: 3.8 mL/hr at 05/12/21 0800, 2 mcg/min at 05/12/21 0800    Warfarin - Pharmacy to Dose, , Other, Rx Dosing/Monitoring, Imani Walter NP    sodium chloride (NS) flush 5-40 mL, 5-40 mL, IntraVENous, Q8H, Triny Limon B, NP, 10 mL at 05/12/21 0621    sodium chloride (NS) flush 5-40 mL, 5-40 mL, IntraVENous, PRN, Braxton Triny B, NP    heparin (porcine) 1,000 unit/mL injection 3,800 Units, 3,800 Units, Hemodialysis, DIALYSIS PRN, Myra Stroud MD, 3,800 Units at 05/11/21 1926    nystatin (MYCOSTATIN) 100,000 unit/gram powder, , Topical, PRN, Avila Gamino MD    0.9% sodium chloride infusion, 11 mL/hr, IntraVENous, CONTINUOUS, Avila MCGREGOR MD, Last Rate: 11 mL/hr at 05/12/21 0759, 11 mL/hr at 05/12/21 0759    PATIENT CARE TEAM:  Patient Care Team:  Rosalba Robb NP as PCP - General (Nurse Practitioner)  Opal Ferrer MD (Cardiology)  Wilmer Malone MD as Physician (Cardiology)  Lucio Castaneda LPN as Care Coordinator  Helane Sandifer, MD (Cardiothoracic Surgery)  Chayo Reese MD (Cardiology)     Thank you for allowing me to participate in this patient's care.     Carlotta Leger NP  [address] [place] [address], [address]  Phone: [phone]

## 2021-05-12 NOTE — PROGRESS NOTES
2000: Bedside and Verbal shift change report [facility] (oncoming nurse) by Bhavani Gutiérrez RN (offgoing nurse). Report included the following information SBAR.

## 2021-05-12 NOTE — PROGRESS NOTES
1545- bedside report and drips verified. Patient intubed and sedated (AC 22, , FiO2 50%, peep 8). 1- daughter asking if patient is receiving her renexa; informed her it is not ordered and will ask Dr. Jasmyne Larkin. 2000- Bedside and Verbal shift change report given to Ave Barth RN (oncoming nurse) by Denice Bonilla RN (offgoing nurse). Report included the following information SBAR, Kardex, Recent Results and Cardiac Rhythm paced.

## 2021-05-12 NOTE — PROGRESS NOTES
[facility]    ICU TEAM Progress Note    Name: Keshav Estrada   : 1950   MRN: [id number]   Date: 2021      Assessment:   Reason for ICU Admission: Acute Hypoxic Respiratory Failure     Brief HPI: 70F with hx of HF on LVAD(HMII) placed 2017, AICD, COPD on Trilogy at home, ESRD now HD dependent, she was recently discharged from [facility] on 2021, patient was transferred from long term care facility for acute encephalopathy and hypoxic respiratory failure, patient last had HD on 2021. Patient had lactic acid of 2.2, MAP 95, LVAD functioning with flow at 5L, PI 2.7 noted intrinsic PEEP with incomplete exhalation phase on mechanical ventilation. Febrile at 101. - Acute Metabolic Encephalopathy  - Coronary Artery Disease  - Non-Ischemic Cardiomyopathy  - Acute on Chronic Heart Failure  - LVAD Dependence  - Long Term Anticoagulation  - End Stage Renal Disease  - COPD  - Morbid Obesity  - Diabetes  - History of Breast Cancer    Plan:     Neuro: sedation with prop, fentanyl, SATs  Pulm: mechanical ventilation. VAP bundle: chlorhexidine, HOB >30 degrees. SBTs. Wean FiO2 as able. Will need addl PEEP given habitus  Cardiac: LVAD - HFT following. Stabilized on low dose NE following iHD. Renal: Fletcher, Epo.   GI: OGT, NPO. Pepcid. ID: UTI - continue vanc/zosyn. Follow cultures and sensitivities  Heme: CTM anemia - concern for slow GI hemorrhage. q8h H+H  Endo: SSI.  Methylpred  MSK:  SCDs    F - Feeding:  No   A - Analgesia: Fentanyl  S - Sedation: Propofol  T - DVT Prophylaxis: SCD's or Sequential Compression Device and Coumadin   C - Code Status: Full Code  H - Head of Bed: > 30 Degrees  U - Ulcer Prophylaxis: Pepcid (famotidine)   G - Glycemic Control: Insulin  S - Spontaneous Breathing Trial: Yes  B - Bowel Regimen: Senna, MiraLax and Docusate (Colace)  I - Indwelling Catheter:   Tubes: ETT and Orogastric Tube  Lines: Peripheral IV, Arterial Line, Central Line and Antonio  Drains: Fletcher Catheter and LVAD  D - De-escalation of Antibiotics: v/z    Subjective:   Overnight Events:   2021 Hgb down a bit    Objective:     Visit Vitals  /80 Comment: treatment ended. Blood returned   Pulse 73   Temp 99.5 °F (37.5 °C)   Resp 22   Ht 5' 7\" (1.702 m)   Wt 103.9 kg (229 lb 0.9 oz)   SpO2 98%   BMI 35.88 kg/m²    O2 Flow Rate (L/min): 5 l/min O2 Device: Endotracheal tube, Ventilator Temp (24hrs), Av.9 °F (37.7 °C), Min:99.5 °F (37.5 °C), Max:100.2 °F (37.9 °C)    CVP (mmHg): 12 mmHg (21 0600)      Hemodynamics:   PAP:   CO:     Wedge:   CI:     CVP:  CVP (mmHg): 12 mmHg (21 0600) SVR:       PVR:       Ventilator Settings:  Mode Rate Tidal Volume Pressure FiO2 PEEP   Assist control, Volume control   450 ml    50 %(per ABG) 8 cm H20     Peak airway pressure: 32 cm H2O    Minute ventilation: 11.3 l/min      Intake/Output:     Intake/Output Summary (Last 24 hours) at 2021 0656  Last data filed at 2021 0600  Gross per 24 hour   Intake 1195.09 ml   Output 1117 ml   Net 78.09 ml     Physical Exam:  General: intubated and sedated  Neurologic:  Moves all 4 extremities spontaneously. Wakes appropriately  Neck:  Normal, scant bloody oral secretions, no erythema or exudates noted. LIJ CVC  Lungs:  Distant lung sounds  Heart:  LVAD hum  Abdomen:  Obese, soft  Skin:  Poor integrity, innumerable bruises from prior venipuncture sites    Labs & Data: Reviewed    Medications: Reviewed    Chest X-Ray:    TTE 21:  · LV: Estimated LVEF is <15%. Dilated left ventricle. Moderate concentric hypertrophy. Severely and globally reduced systolic function. · LA: Dilated left atrium. · MV: Mitral valve non-specific thickening. Moderate mitral valve regurgitation is present. · TV: Moderate to severe tricuspid valve regurgitation is present. · RV: Mildly dilated right ventricle. Mildly reduced systolic function. Pacer/ICD present. · PA: Mild pulmonary hypertension.  Pulmonary arterial systolic pressure is 41 mmHg. · AV: Mild aortic valve regurgitation is present. Multidisciplinary Rounds Completed:  No    ABCDEF Bundle/Checklist Completed: Yes    SPECIAL EQUIPMENT: IHD and LVAD    DISPOSITION: Stay in ICU    CRITICAL CARE CONSULTANT NOTE  I had a face to face encounter with the patient, reviewed and interpreted patient data including clinical events, labs, images, vital signs, I/O's, and examined patient. I have discussed the case and the plan and management of the patient's care with the consulting services, the bedside nurses and the respiratory therapist.      NOTE OF PERSONAL INVOLVEMENT IN CARE   This patient has a high probability of imminent, clinically significant deterioration, which requires the highest level of preparedness to intervene urgently. I participated in the decision-making and personally managed or directed the management of the following life and organ supporting interventions that required my frequent assessment to treat or prevent imminent deterioration. I personally spent 65 minutes of critical care time. This is time spent at this critically ill patient's bedside actively involved in patient care as well as the coordination of care and discussions with the patient's family. This does not include any procedural time which has been billed separately.     Evelin Carver MD  Staff Intensivist/Anesthesiologist  [facility]  5/12/2021

## 2021-05-12 NOTE — PROGRESS NOTES
Bedside shift change report given to Cynthia Canales RN (oncoming nurse) by May Patel RN (offgoing nurse). Report included the following information SBAR, Kardex, ED Summary, Procedure Summary, Intake/Output, MAR, Recent Results and Cardiac Rhythm V Paced. 0830 Pt agitated on 35mcg of propofol, 50mcg fentanyl. Responds to voice, pulling at restraints. On Levo for BP management, abx infusing  Per MD Zahra Francis - plans for SBT tomorrow after HD  0900 Pts daughter at bedside, updated. 1212 Levo held/stopped - MAP 84  1255 Fentanyl increased to 75mcg d/t agitation. 1520 Propofol to 45mcg for agitation. Bedside shift change report given to Naila Johnston RN (oncoming nurse) by Cynthia Canales RN (offgoing nurse). Report included the following information SBAR, Kardex, ED Summary, Procedure Summary, Intake/Output, MAR, Recent Results and Cardiac Rhythm V paced.

## 2021-05-12 NOTE — TELEPHONE ENCOUNTER
Fax received from [facility] stating patient overdue for home INR check. Contacted [employer] to notify patient has been in the hospital and is again in the hospital. They will reschedule next INR check out to 30 days. Niyah Smart RN.

## 2021-05-13 NOTE — PROGRESS NOTES
05/13/21 0433   Patient Observations   Pulse (Heart Rate) 72   Resp Rate 15   O2 Sat (%) 100 %   Vent Settings   FIO2 (%) 40 %   SpO2/FIO2 Ratio 250   CMV Rate Set 14  (decreased to encourage spont breaths)   Back-Up Rate 14   Vt Set (ml) 450 ml   PEEP/VENT (cm H2O) 5 cm H20  (weaned per protocol)   I:E Ratio 1:3   Insp Flow (l/min) 46 l/min   Flow Trigger 3   Ventilator Measurements   Resp Rate Observed 15   Vt Exhaled (Machine Breath) (ml) 455 ml   Ve Observed (l/min) 6.35 l/min   PIP Observed (cm H2O) 24 cm H2O   MAP (cm H2O) 11   I:E Ratio Actual 1:3   Vent Method/Mode   Ventilation Method Conventional   Ventilator Mode Assist control;Volume control

## 2021-05-13 NOTE — PROGRESS NOTES
Pharmacist Note  Warfarin Dosing  Consult provided for this 78 y. o.female to manage warfarin for LVAD    INR Goal: 1.8-2.5    Home regimen/ tablet size: 5 mg daily    Drugs that may increase INR: None  Drugs that may decrease INR: None  Other current anticoagulants/ drugs that may increase bleeding risk: None  Risk factors: Age > 65  Daily INR ordered: YES    Recent Labs     05/13/21  0317 05/12/21  0223 05/11/21  0556   HGB 8.3* 8.9* 10.5*   INR 1.4* 1.9* 3.3*     Date               INR                  Dose  5/11  3.3  hold    5/12               1.9                    2 mg      5/13                 1.4                   4 mg                                                                        Assessment/ Plan: Will order  warfarin 4 mg today     Pharmacy will continue to monitor daily and adjust therapy as indicated. Please contact the pharmacist at a [phone] or  for outpatient recommendations if needed.

## 2021-05-13 NOTE — PROGRESS NOTES
[facility]   [address] [facility], [address] [place] [address], [facility]  Phone: [phone]   [phone]       Nephrology Progress Note  Patrica Mcneil     1950     [id number]  Date of Admission : 5/11/2021 05/13/21    CC: Follow up for ARF    Assessment and Plan   ROCIO on CKD:  - 2/2 progressive CKD and CRS  - dialysis initiated 4/26/21. Has RIJ permacath for access. Cx negative   -  HD today   - her prognosis is extremely poor on dialysis with life expectancy of less than a year   - sahu can be removed post extubation     Sepsis :  UTI :  follow up Cx results. abx per primary team   Blood cx from Permacath : negative so far      Acute Hypoxic Resp failure :  - baseline severe COPD + chronic CHF + morbid obesity/ JED   - on vent   - per [facility]     Chronic systolic CHF, stage C NYHA class IV  Combined ischemic and nonischemic cardiomyopathy  S/p HM 2 LVAD implant 4/15/2017 by Dr. Blas Castleman at [facility]  hx of recurrent VT : off mexiletine  chronic RV failure. 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 - per AHF team      Chronic anemia :   ATTR amyloidosis  - anemia of chronic disease   - continue HEIKE  MWF     Severe COPD. Pulmonary hypertension. Chronic A. fib. History of endometrial Ca       Interval History:  Seen and examined  Sedation off, following commands   Starting dialysis now   BP stable   CVP 6-8 cm       Review of Systems: Review of systems not obtained due to patient factors.     Current Medications:   Current Facility-Administered Medications   Medication Dose Route Frequency    epoetin amalia-epbx (RETACRIT) injection 20,000 Units  20,000 Units SubCUTAneous Q TUE, THU & SAT    piperacillin-tazobactam (ZOSYN) 3.375 g in 0.9% sodium chloride (MBP/ADV) 100 mL MBP  3.375 g IntraVENous Q12H    Vancomycin Pharmacy Dosing   Other PRN    zinc oxide-cod liver oil (DESITIN) 40 % paste   Topical Q12H    sodium chloride (NS) flush 5-10 mL  5-10 mL IntraVENous PRN    sodium chloride (NS) flush 5-40 mL  5-40 mL IntraVENous Q8H    sodium chloride (NS) flush 5-40 mL  5-40 mL IntraVENous PRN    acetaminophen (TYLENOL) tablet 650 mg  650 mg Oral Q6H PRN    Or    acetaminophen (TYLENOL) suppository 650 mg  650 mg Rectal Q6H PRN    polyethylene glycol (MIRALAX) packet 17 g  17 g Oral DAILY PRN    albuterol-ipratropium (DUO-NEB) 2.5 MG-0.5 MG/3 ML  3 mL Nebulization Q6H PRN    methylPREDNISolone (PF) (SOLU-MEDROL) injection 40 mg  40 mg IntraVENous DAILY    famotidine (PF) (PEPCID) 20 mg in 0.9% sodium chloride 10 mL injection  20 mg IntraVENous DAILY    propofol (DIPRIVAN) 10 mg/mL infusion  0-50 mcg/kg/min IntraVENous TITRATE    fentaNYL (PF) 1,500 mcg/30 mL (50 mcg/mL) infusion  0-200 mcg/hr IntraVENous TITRATE    NOREPINephrine (LEVOPHED) 8 mg in 5% dextrose 250mL (32 mcg/mL) infusion  0.5-16 mcg/min IntraVENous TITRATE    Warfarin - Pharmacy to Dose   Other Rx Dosing/Monitoring    sodium chloride (NS) flush 5-40 mL  5-40 mL IntraVENous Q8H    sodium chloride (NS) flush 5-40 mL  5-40 mL IntraVENous PRN    heparin (porcine) 1,000 unit/mL injection 3,800 Units  3,800 Units Hemodialysis DIALYSIS PRN    nystatin (MYCOSTATIN) 100,000 unit/gram powder   Topical PRN    0.9% sodium chloride infusion  11 mL/hr IntraVENous CONTINUOUS      Allergies   Allergen Reactions    Benzodiazepines Other (comments)     Respiratory issues       Objective:  Vitals:    Vitals:    05/13/21 0400 05/13/21 0433 05/13/21 0500 05/13/21 0600   BP:       Pulse: 73 72 70 70   Resp: 22 15 16 14   Temp: 97.7 °F (36.5 °C)      SpO2: 94% 100% 97% 98%   Weight: 103.7 kg (228 lb 9.9 oz)      Height:         Intake and Output:  No intake/output data recorded.   05/11 1901 - 05/13 0700  In: 1978.4 [I.V.:1978.4]  Out: 1187 [Urine:187]    Physical Examination:  Pt intubated    Yes  General: Obese   HEENT            Pupils reactive   Neck:  permacath +  Resp:  Lungs CTA B/L, no wheezing   CV:  RRR,  no murmur or rub,no LE edema  GI:  Soft, NT, + BS  Neurologic:  Sedated   Psych:             Unable to assess      []    High complexity decision making was performed  []    Patient is at high-risk of decompensation with multiple organ involvement    Lab Data Personally Reviewed: I have reviewed all the pertinent labs, microbiology data and radiology studies during assessment. Recent Labs     05/13/21 0317 05/12/21 0223 05/11/21 0556 05/11/21 0217    138 137 135*   K 4.2 3.8 4.0 4.3    104 103 99   CO2 21 22 24 26   * 104* 172* 172*   BUN 45* 27* 49* 47*   CREA 4.78* 3.67* 6.51* 6.50*   CA 9.1 9.1 9.3 10.0   MG 3.2* 2.8* 3.4*  --    PHOS 6.0* 3.6 4.5  --    ALB 3.4* 3.6 3.9 4.2   ALT 10* 8* 10* 12   INR 1.4* 1.9* 3.3* 3.4*     Recent Labs     05/13/21 0317 05/12/21 0223 05/11/21 0556 05/11/21 0217   WBC 7.4 11.4* 8.0 8.5   HGB 8.3* 8.9* 10.5* 10.9*   HCT 27.6* 29.9* 35.7 36.8   * 167 193 248     No results found for: SDES  Lab Results   Component Value Date/Time    Culture result: PENDING 05/12/2021 04:29 AM    Culture result: LIGHT YEAST, (APPARENT CANDIDA ALBICANS) (A) 05/11/2021 07:09 PM    Culture result: FEW NORMAL RESPIRATORY BETO 05/11/2021 07:09 PM    Culture result: NO GROWTH 2 DAYS 05/11/2021 04:30 PM    Culture result: NO GROWTH 2 DAYS 05/11/2021 02:27 AM     Recent Results (from the past 24 hour(s))   LACTIC ACID    Collection Time: 05/13/21  3:17 AM   Result Value Ref Range    Lactic acid 0.8 0.4 - 2.0 MMOL/L   PROTHROMBIN TIME + INR    Collection Time: 05/13/21  3:17 AM   Result Value Ref Range    INR 1.4 (H) 0.9 - 1.1      Prothrombin time 14.5 (H) 9.0 - 11.1 sec   MIXING STUDY, APTT    Collection Time: 05/13/21  3:17 AM   Result Value Ref Range    aPTT mixing study          aPTT 32.8 (H) 22.1 - 31.0 sec    aPTT 1:1 mix patient  sec     Mixing study not performed as it is uninterpretable when PTT is normal or <= 5 seconds prolonged.    PROCALCITONIN    Collection Time: 05/13/21 3: 17 AM   Result Value Ref Range    Procalcitonin 13.21 ng/mL   LD    Collection Time: 05/13/21  3:17 AM   Result Value Ref Range     81 - 246 U/L   CBC WITH AUTOMATED DIFF    Collection Time: 05/13/21  3:17 AM   Result Value Ref Range    WBC 7.4 3.6 - 11.0 K/uL    RBC 3.02 (L) 3.80 - 5.20 M/uL    HGB 8.3 (L) 11.5 - 16.0 g/dL    HCT 27.6 (L) 35.0 - 47.0 %    MCV 91.4 80.0 - 99.0 FL    MCH 27.5 26.0 - 34.0 PG    MCHC 30.1 30.0 - 36.5 g/dL    RDW 20.7 (H) 11.5 - 14.5 %    PLATELET 171 (L) 765 - 400 K/uL    MPV 9.7 8.9 - 12.9 FL    NRBC 0.0 0  WBC    ABSOLUTE NRBC 0.00 0.00 - 0.01 K/uL    NEUTROPHILS 88 (H) 32 - 75 %    LYMPHOCYTES 5 (L) 12 - 49 %    MONOCYTES 6 5 - 13 %    EOSINOPHILS 0 0 - 7 %    BASOPHILS 0 0 - 1 %    IMMATURE GRANULOCYTES 1 (H) 0.0 - 0.5 %    ABS. NEUTROPHILS 6.5 1.8 - 8.0 K/UL    ABS. LYMPHOCYTES 0.4 (L) 0.8 - 3.5 K/UL    ABS. MONOCYTES 0.4 0.0 - 1.0 K/UL    ABS. EOSINOPHILS 0.0 0.0 - 0.4 K/UL    ABS. BASOPHILS 0.0 0.0 - 0.1 K/UL    ABS. IMM. GRANS. 0.1 (H) 0.00 - 0.04 K/UL    DF SMEAR SCANNED      RBC COMMENTS OVALOCYTES  PRESENT        RBC COMMENTS ANISOCYTOSIS  2+       METABOLIC PANEL, COMPREHENSIVE    Collection Time: 05/13/21  3:17 AM   Result Value Ref Range    Sodium 137 136 - 145 mmol/L    Potassium 4.2 3.5 - 5.1 mmol/L    Chloride 103 97 - 108 mmol/L    CO2 21 21 - 32 mmol/L    Anion gap 13 5 - 15 mmol/L    Glucose 110 (H) 65 - 100 mg/dL    BUN 45 (H) 6 - 20 MG/DL    Creatinine 4.78 (H) 0.55 - 1.02 MG/DL    BUN/Creatinine ratio 9 (L) 12 - 20      GFR est AA 11 (L) >60 ml/min/1.73m2    GFR est non-AA 9 (L) >60 ml/min/1.73m2    Calcium 9.1 8.5 - 10.1 MG/DL    Bilirubin, total 0.7 0.2 - 1.0 MG/DL    ALT (SGPT) 10 (L) 12 - 78 U/L    AST (SGOT) 19 15 - 37 U/L    Alk.  phosphatase 48 45 - 117 U/L    Protein, total 7.7 6.4 - 8.2 g/dL    Albumin 3.4 (L) 3.5 - 5.0 g/dL    Globulin 4.3 (H) 2.0 - 4.0 g/dL    A-G Ratio 0.8 (L) 1.1 - 2.2     CK    Collection Time: 05/13/21  3:17 AM Result Value Ref Range    CK 25 (L) 26 - 192 U/L   MAGNESIUM    Collection Time: 05/13/21  3:17 AM   Result Value Ref Range    Magnesium 3.2 (H) 1.6 - 2.4 mg/dL   PHOSPHORUS    Collection Time: 05/13/21  3:17 AM   Result Value Ref Range    Phosphorus 6.0 (H) 2.6 - 4.7 MG/DL           Total time spent with patient:  xxx   min. Care Plan discussed with:  Patient     Family      RN      Consulting Physician [address] [place] [address]        I have reviewed the flowsheets. Chart and Pertinent Notes have been reviewed. No change in PMH ,family and social history from Consult note.       Sandra Selby MD

## 2021-05-13 NOTE — PROGRESS NOTES
0745- bedside report and drips verified. Patient intubated, lightly sedated (following simple voice commands), and on HD. HD RN at bedside as well.    0800- HD RN stated doppler MAP over 100; MAP on monitor also reading over 100 (114). LVAD flows 4.7, PI 6.5, power 5.5. Will inform team.    4338- patient awake and nodding head to simple commands. Patient continuously attempting to reach for ETT, restraints re-accessed and tightened as needed; needing frequent reminders to not reach for lines and ETT.     0920- Dr. Caroline Reyna and Anne Moseley, TEA at bedside for rounds. Anne Moseley will place orders for EKG, heparin drip, and UA. 1052- PRN hydralazine given, .    1100- heparin drip started. 1115- HD completed. Will perform EKG now. 1147- propofol weaned off. Patient following commands (opening eyes, lifting head, squeezing/moving all extremities). 1212- RT at bedside to place patient on CPAP. 1254- extubate per Dr. She Childs. 1258- patient suctioned and extubated to 4 L nasal cannula. Patient able to cough and state her name post extubation. 1730- passed dysphagia screening. 1800- sahu removed without difficulty. 2000- Bedside and Verbal shift change report given to Jeet Cantor RN (oncoming nurse) by Efren Sahu RN (offgoing nurse). Report included the following information SBAR, Kardex, Recent Results and Cardiac Rhythm paced.

## 2021-05-13 NOTE — PROGRESS NOTES
Pharmacist Note - Vancomycin Dosing  Therapy day 3  Indication: Sepsis  Current regimen: 750 mg after each HD    Recent Labs     05/13/21  0317 05/12/21  0223 05/11/21  0556   WBC 7.4 11.4* 8.0   CREA 4.78* 3.67* 6.51*   BUN 45* 27* 49*       A post-HD level resulted at 19.4 mcg/mL     Goal trough: 15 - 20 mcg/mL     Plan: No post-HD dose needed today. Would decrease future post HD doses to 500 mg. Pharmacy will continue to monitor this patient daily for changes in clinical status and renal function.

## 2021-05-13 NOTE — PROGRESS NOTES
05/13/21 0328   Vent Settings   FIO2 (%) 40 %   SpO2/FIO2 Ratio 247.5   CMV Rate Set 22   Back-Up Rate 22   Vt Set (ml) 450 ml   PEEP/VENT (cm H2O) 8 cm H20   I:E Ratio 1:2   Insp Flow (l/min) 53 l/min   Flow Trigger 3   Ventilator Measurements   Resp Rate Observed 22   Vt Exhaled (Machine Breath) (ml) 470 ml   Ve Observed (l/min) 10.3 l/min   PIP Observed (cm H2O) 30 cm H2O   Plateau Pressure (cm H2O) 25 cm H2O   Driving Pressure 17 [id number]   MAP (cm H2O) 16   I:E Ratio Actual 1:2   Auto PEEP Observed (cm H2O) 0.9 cm H2O   Static Compliance (ml/cm H20) 32 ml/cm H20   Vent Method/Mode   Ventilation Method Conventional   Ventilator Mode Assist control;Volume control     RT able to wean FIO2 to 40% per protocol. Otherwise vent stable overnight.

## 2021-05-13 NOTE — CONSULTS
Palliative Medicine Consult  Charlie: [phone] ([phone])    Patient Name: Teddy Jernigan  YOB: 1950    Date of Initial Consult: May 12, 2021  Reason for Consult: Care decisions  Requesting Provider: Tony Wright  Primary Care Physician: Rosalba Robb NP     SUMMARY:   Teddy Jernigan is a 78 y.o. with a past history of heart failure, CAD, AICD, nonischemic cardiomyopathy, LVAD, ESRD started dialysis 4/26, COPD (trilogy), morbid obesity, DM, breast cancer, who was admitted on 5/11/2021 from rehab with a diagnosis of acute metabolic encephalopathy, acute on chronic heart failure. Current medical issues leading to Palliative Medicine involvement include: Support with care decisions. Patient acutely ill with multiple morbidities and high risk of compromise. Social: , one daughter, lives at home alone, daughter lives 2 hours away,  Worked at [employer] before leaving to care for her ill . PALLIATIVE DIAGNOSES:   1. Shortness of breath  2. Encephalopathy  3. Feeding difficulties  4. Physical debility      PLAN:   1. Patient seen with daughter at the bedside. Patient intubated and sedated unable to provide any information  2. Plans for extubation   3. Will follow up with pt if she is able to engage in discussion  4. Daughter has no questions or concerns   5. Goals: continue current care      6. Initial consult note routed to primary continuity provider and/or primary health care team members  7.  Communicated plan of care with: Palliative Cyndee CALVILLO [facility] Team     GOALS OF CARE / TREATMENT PREFERENCES:     GOALS OF CARE:  Patient/Health Care Proxy Stated Goals: Rehabilitation    TREATMENT PREFERENCES:   Code Status: Full Code    Advance Care Planning:  [] [facility] Interdisciplinary Team has updated the ACP Navigator with [employer] and Patient Capacity      Primary Decision Maker: Gaye Lawrence - Daughter - [phone]  Advance Care Planning 4/22/2021   Patient's Healthcare Decision Maker is: -   Confirm Advance Directive Yes, not on file   Patient Would Like to Complete Advance Directive -       Medical Interventions: Full interventions     Other Instructions: Other:    As far as possible, the palliative care team has discussed with patient / health care proxy about goals of care / treatment preferences for patient. HISTORY:     History obtained from: Chart, team, family    CHIEF COMPLAINT: Patient admitted with  forementioned history and issues    HPI/SUBJECTIVE:    The patient is:   [] Verbal and participatory  [x] Non-participatory due to:   Medical condition    Clinical Pain Assessment (nonverbal scale for severity on nonverbal patients):   Clinical Pain Assessment  Severity: 0     Activity (Movement): Lying quietly, normal position    Duration: for how long has pt been experiencing pain (e.g., 2 days, 1 month, years)  Frequency: how often pain is an issue (e.g., several times per day, once every few days, constant)     FUNCTIONAL ASSESSMENT:     Palliative Performance Scale (PPS):  PPS: 50       PSYCHOSOCIAL/SPIRITUAL SCREENING:     Palliative IDT has assessed this patient for cultural preferences / practices and a referral made as appropriate to needs (Cultural Services, Patient Advocacy, Ethics, etc.)    Any spiritual / Zoroastrianism concerns:  [] Yes /  [x] No    Caregiver Burnout:  [] Yes /  [x] No /  [] No Caregiver Present      Anticipatory grief assessment:   [x] Normal  / [] Maladaptive       ESAS Anxiety: Anxiety: 0    ESAS Depression:          REVIEW OF SYSTEMS:     Positive and pertinent negative findings in ROS are noted above in HPI. The following systems were [] reviewed / [x] unable to be reviewed as noted in HPI  Other findings are noted below. Systems: constitutional, ears/nose/mouth/throat, respiratory, gastrointestinal, genitourinary, musculoskeletal, integumentary, neurologic, psychiatric, endocrine.  Positive findings noted below. Modified ESAS Completed by: provider   Fatigue: 10 Drowsiness: 8     Pain: 0   Anxiety: 0     Anorexia: 10 Dyspnea: 0                    PHYSICAL EXAM:     From RN flowsheet:  Wt Readings from Last 3 Encounters:   05/13/21 228 lb 9.9 oz (103.7 kg)   05/06/21 246 lb 11.1 oz (111.9 kg)   04/15/21 271 lb (122.9 kg)     Blood pressure 100/80, pulse (!) 112, temperature 97.4 °F (36.3 °C), resp. rate 29, height 5' 7\" (1.702 m), weight 228 lb 9.9 oz (103.7 kg), SpO2 92 %. Pain Scale 1: Adult Nonverbal Pain Scale  Pain Intensity 1: 0                 Last bowel movement, if known:     Constitutional: Ill-appearing, sedated  Eyes: Unable to assess  ENMT: no nasal discharge, moist mucous membranes  Cardiovascular: regular rhythm  Respiratory: breathing not labored on vent, symmetric  Gastrointestinal: Gross, soft non-tender, +bowel sounds  Musculoskeletal: no deformity, no tenderness to palpation  Skin:   Neurologic: Sedated, encephalopathic prior to sedation  Psychiatric: Sedated  Other:       HISTORY:     Active Problems:    Acute encephalopathy (5/11/2021)      Past Medical History:   Diagnosis Date    Asthma     Cancer ([facility]     breast    Cancer ([facility]     endometrial    Congestive heart failure, unspecified     CRI (chronic renal insufficiency)     Depression     Diabetes ([facility]     Hypertension       Past Surgical History:   Procedure Laterality Date    HX HERNIA REPAIR      HX HYSTERECTOMY      HX MASTECTOMY      IR INSERT NON TUNL CVC OVER 5 YRS  4/26/2021    IR INSERT TUNL CVC W/O PORT OVER 5 YR  5/5/2021      No family history on file. History reviewed, no pertinent family history.   Social History     Tobacco Use    Smoking status: Never Smoker    Smokeless tobacco: Never Used   Substance Use Topics    Alcohol use: Not Currently     Allergies   Allergen Reactions    Benzodiazepines Other (comments)     Respiratory issues      Current Facility-Administered Medications   Medication Dose Route Frequency    albumin human 25% (BUMINATE) solution 50 g  50 g IntraVENous DIALYSIS PRN    Vancomycin random level ~ 10 am  (prior to dialysis)   Other ONCE    warfarin (COUMADIN) tablet 4 mg  4 mg Oral ONCE    heparin (porcine) 1,000 unit/mL injection 1,900 Units  1,900 Units InterCATHeter DIALYSIS PRN    And    heparin (porcine) 1,000 unit/mL injection 1,900 Units  1,900 Units InterCATHeter DIALYSIS PRN    hydrALAZINE (APRESOLINE) 20 mg/mL injection 20 mg  20 mg IntraVENous Q6H PRN    hydrALAZINE (APRESOLINE) 20 mg/mL injection 10 mg  10 mg IntraVENous Q6H PRN    ranolazine ER (RANEXA) tablet 1,000 mg  1,000 mg Oral BID    hydrALAZINE (APRESOLINE) tablet 100 mg  100 mg Oral TID    isosorbide mononitrate ER (IMDUR) tablet 30 mg  30 mg Oral DAILY    heparin 25,000 units in D5W 250 ml infusion  9-25 Units/kg/hr IntraVENous TITRATE    tafamidis cap 61 mg (Patient Supplied)  61 mg Oral DAILY    epoetin amalia-epbx (RETACRIT) injection 20,000 Units  20,000 Units SubCUTAneous Q TUE, THU & SAT    piperacillin-tazobactam (ZOSYN) 3.375 g in 0.9% sodium chloride (MBP/ADV) 100 mL MBP  3.375 g IntraVENous Q12H    Vancomycin Pharmacy Dosing   Other PRN    zinc oxide-cod liver oil (DESITIN) 40 % paste   Topical Q12H    sodium chloride (NS) flush 5-10 mL  5-10 mL IntraVENous PRN    sodium chloride (NS) flush 5-40 mL  5-40 mL IntraVENous Q8H    sodium chloride (NS) flush 5-40 mL  5-40 mL IntraVENous PRN    acetaminophen (TYLENOL) tablet 650 mg  650 mg Oral Q6H PRN    Or    acetaminophen (TYLENOL) suppository 650 mg  650 mg Rectal Q6H PRN    polyethylene glycol (MIRALAX) packet 17 g  17 g Oral DAILY PRN    albuterol-ipratropium (DUO-NEB) 2.5 MG-0.5 MG/3 ML  3 mL Nebulization Q6H PRN    methylPREDNISolone (PF) (SOLU-MEDROL) injection 40 mg  40 mg IntraVENous DAILY    famotidine (PF) (PEPCID) 20 mg in 0.9% sodium chloride 10 mL injection  20 mg IntraVENous DAILY    propofol (DIPRIVAN) 10 mg/mL infusion  0-50 mcg/kg/min IntraVENous TITRATE    fentaNYL (PF) 1,500 mcg/30 mL (50 mcg/mL) infusion  0-200 mcg/hr IntraVENous TITRATE    NOREPINephrine (LEVOPHED) 8 mg in 5% dextrose 250mL (32 mcg/mL) infusion  0.5-16 mcg/min IntraVENous TITRATE    Warfarin - Pharmacy to Dose   Other Rx Dosing/Monitoring    sodium chloride (NS) flush 5-40 mL  5-40 mL IntraVENous Q8H    sodium chloride (NS) flush 5-40 mL  5-40 mL IntraVENous PRN    nystatin (MYCOSTATIN) 100,000 unit/gram powder   Topical PRN    0.9% sodium chloride infusion  11 mL/hr IntraVENous CONTINUOUS          LAB AND IMAGING FINDINGS:     Lab Results   Component Value Date/Time    WBC 7.4 05/13/2021 03:17 AM    HGB 8.3 (L) 05/13/2021 03:17 AM    PLATELET 572 (L) 15/17/5344 03:17 AM     Lab Results   Component Value Date/Time    Sodium 137 05/13/2021 03:17 AM    Potassium 4.2 05/13/2021 03:17 AM    Chloride 103 05/13/2021 03:17 AM    CO2 21 05/13/2021 03:17 AM    BUN 45 (H) 05/13/2021 03:17 AM    Creatinine 4.78 (H) 05/13/2021 03:17 AM    Calcium 9.1 05/13/2021 03:17 AM    Magnesium 3.2 (H) 05/13/2021 03:17 AM    Phosphorus 6.0 (H) 05/13/2021 03:17 AM      Lab Results   Component Value Date/Time    Alk.  phosphatase 48 05/13/2021 03:17 AM    Protein, total 7.7 05/13/2021 03:17 AM    Albumin 3.4 (L) 05/13/2021 03:17 AM    Globulin 4.3 (H) 05/13/2021 03:17 AM     Lab Results   Component Value Date/Time    INR 1.4 (H) 05/13/2021 03:17 AM    Prothrombin time 14.5 (H) 05/13/2021 03:17 AM    aPTT 32.8 (H) 05/13/2021 03:17 AM      Lab Results   Component Value Date/Time    Iron 22 (L) 04/27/2021 05:17 AM    TIBC 213 (L) 04/27/2021 05:17 AM    Iron % saturation 10 (L) 04/27/2021 05:17 AM    Ferritin 150 04/27/2021 05:17 AM      Lab Results   Component Value Date/Time    pH 7.22 (LL) 05/11/2021 03:44 AM    PCO2 56 (H) 05/11/2021 03:44 AM    PO2 297 (H) 05/11/2021 03:44 AM     No components found for: [place]   Lab Results   Component Value Date/Time    CK 25 (L) 05/13/2021 03:17 AM                Total time: 25 minutes  Counseling / coordination time, spent as noted above: 20 minutes  > 50% counseling / coordination?:  Yes    Prolonged service was provided for  []30 min   []75 min in face to face time in the presence of the patient, spent as noted above. Time Start:   Time End:   Note: this can only be billed with 87765 (initial) or 21239 (follow up). If multiple start / stop times, list each separately.

## 2021-05-13 NOTE — PROGRESS NOTES
2000 Assumed care of patient from [address] [place] [address]    Brief shift summary: Did not need vasopressor overnight, pulled out arterial line during SAT once propofol was off. Pressure held to site x10 minutes. Bedside and Verbal shift change report given to [address] [place] [address] (oncoming nurse) by Kayla Musa (offgoing nurse). Report included the following information SBAR, Kardex, [facility], Recent Results and Cardiac Rhythm Paced .

## 2021-05-13 NOTE — PROGRESS NOTES
05/13/21 1212   Weaning Parameters   Spontaneous Breathing Trial Complete Yes   Resp Rate Observed 24   Ve 8.8      RSBI 62       ABG drawn. Patient extubated to Alexus Atkins per Dr. Ruslan Dave. Order. RN at bedside. Family present.

## 2021-05-13 NOTE — PROGRESS NOTES
[facility] in [place], [place]  Inpatient Consult Progress Note      Patient name: Earlene Perez  Patient : 1950  Patient MRN: [id number]  Consulting MD: Genaro Toledo MD  Date of service: 21    CHIEF COMPLAINT:  Chief Complaint   Patient presents with    Altered mental status        PLAN:  Continue set speed 9400rpm, low speed 9000rpm   Keep CVP 10-12 goal   Resume home dose hydralazine/Imdur  ResumeTafamidis 61mg daily, ok for patient to take her own   Intolerant to BB/ACEi/ARB/ARNI due to aTTR  Intolerant to MRA due to hyperkalemia  Resume Ranexa   Volume management per Nephrology; strict I/O, standing weights  HD today   EKG today   Vent management per Intensivist- SBTs today- plan for extubation   Sedation management per intensivist  Antibiotic regimen per intensivist   Blood cultures, wound culture and Urine cx NGTD  Sputum cx-yeast noted   Coumadin dose per pharmacy; goal INR lowered to 1.8-2.5 due to GIB  Start heparin gtt for bridge   Antibiotics for chronic sternal wound infection- continue Keflex po when appropriate  Cont to hold synthroid for now, recheck TSH before discharge   Bowel regimen  Advance diet when extubated as tolerated   PT/OT when appropriate   Up to date on flu, PNA, and COVID vaccinations      IMPRESSION:  Likely urosepsis  Acute respiratory failure   Chronic RV failure  Coronary artery disease  · [facility] (2016) high grade ramus and small PDA disease, borderline disease of LAD and takeoff of pRCA  Chronic systolic heart failure  · Stage C, NYHA class IV improved to IIIA symptoms with LVAD  · Combined ischemic and non-ischemic cardiomyopathy, 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 Ralph Velasco)  · C/b delayed extubation due to severe COPD  · C/b critical illness polyneuropathy  · C/b prolonged hospitalization post-LVAD, 55 days  · C/b sternal wound infection s/p debridement (by Dr. Julian Pathak) s/p wound vac  · C/b sacral ulcer  · Would culture positive for Staph aureus, not MRSA  · C/b LVAD site drainage, improved  S/p CRT-ICD  · ICD fired due to electrolyte imbalance (2011)  H/o breast cancer (1992)   · s/p bilateral mastectomy/chemo and endometrial cancer s/p hysterectomy  · Lymphedema of LLE due to cancer treatment  Severe COPD with FEV1 50%  Depression  Atrial fibrillation  H/o \"two mini strokes\"  S/p fall with hip facture   · Right hip hemmiarthroplasty (5/23/18) by Dr. Jun Camejo)  · S/p removed hip hardwarare due to pain (4/15/19)  COPD severe  CKD, stage 4- on HD  Hyperkalemia, resolved  Pulmonary hypertension  Cardiac risk factors:  · Morbid obesity, Body mass index is 42.29 kg/m². · DM2 insulin dependent  · JED on Trilogy  · HL  Urinary incontinence, severe  · no procedures due to anticoagulation  Endometrial cancer (2002)  HTN  HL    Recent Events:  Remains on antibiotics  MAPs stable  CVP 6-8  Tolerating HD  Hypertensive     CARDIAC IMAGING:  TTE 5/11/21 LVEF < 15%, LVIDd 5.96cm, TAPSE 0.99cm, RVIDd 4.14cm  TTE 4/19 shows improved LVIDd, 6.68 cm with RVIDd 5.14 cm and TAPSE 1.1 cm   TTE 4/28/21 LVIDd 7.37cm, RVIDd 5.27cm, TAPSE 1.44cm   Echo (9/24/19) LVEF 20-25%, AV opens 1:1, no AR  Echo (5/29/18) LVEF 10-%, ramps study done, report in epic  Echo (1/9/18) ramp study done, LVEDD 7.1cm  LHC (11/9/18) 2 vessel disease with 90% OM, 80% PDA, DSA to PDA branch  RHC 4/20 showed adequate Sal CI 3.0 but severely elevated RA pressure 24 mmHg  CXR negative 4/27/21     INTERVAL HISTORY:  Ms. Chiqui Cortes is a 78 y.o. Female s/p LVAD implant with HM 2 and ESRD requiring HD that was recently admitted for RV failure and worsening renal function. She was discharged on 5/5/21 to [facility] for rehabilitation before transitioning home to live her with daughter.  In the last few days she had worsening mental status changed, decreased UOP and respiratory distress after possible aspiration- she was intubated and admitted to [facility] for further management. LVAD INTERROGATION:  Device interrogated in person  Device function normal, normal flow, no events  LVAD   Pump Speed (RPM): 9400  Pump Flow (LPM): 4.5  MAP: 72  PI (Pulsitility Index): 6  Power: 5.4   Test: Yes  Back Up  at Bedside & Labeled: Yes  Power Module Test: Yes  Driveline Site Care: No  Driveline Dressing: Clean, Dry, and Intact  Outpatient: No  MAP in Therapeutic Range (Outpatient): Yes  Testing  Alarms Reviewed: Yes  Back up SC speed: 9400  Back up Low Speed Limit: 9000  Emergency Equipment with Patient?: Yes  Emergency procedures reviewed?: Yes  Drive line site inspected?: No  Drive line intergrity inspected?: Yes  Drive line dressing changed?: No    PHYSICAL EXAM:  Visit Vitals  /80 Comment: treatment ended. Blood returned   Pulse 93   Temp 97.8 °F (36.6 °C) (Axillary)   Resp 14   Ht 5' 7\" (1.702 m)   Wt 228 lb 9.9 oz (103.7 kg)   SpO2 98%   BMI 35.81 kg/m²     General: Patient is well developed, well-nourished in no acute distress, sedated   HEENT: Normocephalic and atraumatic. No scleral icterus. Pupils are equal, round and reactive to light and accomodation. No conjunctival injection. Oropharynx is clear. Neck: Supple. No evidence of thyroid enlargements or lymphadenopathy. JVD: Cannot be appreciated   Lungs: Breath sounds are equal and clear bilaterally. No wheezes, rhonchi, or rales. On Vent  Heart: Regular rate and rhythm with normal S1 and S2. No murmurs, gallops or rubs. Abdomen: Soft, no mass or tenderness. No organomegaly or hernia. Bowel sounds present. Genitourinary and rectal: deferred  Extremities: No cyanosis, clubbing, or edema. Neurologic: No focal sensory or motor deficits are noted. Grossly intact. Psychiatric: Awake, alert an doriented x 3. Appropriate mood and affect.   Skin: Warm, dry and well perfused. No lesions, nodules or rashes are noted. REVIEW OF SYSTEMS:  Review of Systems   Unable to perform ROS: Acuity of condition         PAST MEDICAL HISTORY:  Past Medical History:   Diagnosis Date    Asthma     [facility]     breast    Cancer ([facility]     endometrial    Congestive heart failure, unspecified     CRI (chronic renal insufficiency)     Depression     Diabetes ([facility]     Hypertension        PAST SURGICAL HISTORY:  Past Surgical History:   Procedure Laterality Date    HX HERNIA REPAIR      HX HYSTERECTOMY      HX MASTECTOMY      IR INSERT NON TUNL CVC OVER 5 YRS  4/26/2021    IR INSERT TUNL CVC W/O PORT OVER 5 YR  5/5/2021       FAMILY HISTORY:  No family history on file. SOCIAL HISTORY:  Social History     Socioeconomic History    Marital status:      Spouse name: Not on file    Number of children: Not on file    Years of education: Not on file    Highest education level: Not on file   Tobacco Use    Smoking status: Never Smoker    Smokeless tobacco: Never Used   Substance and Sexual Activity    Alcohol use: Not Currently       LABORATORY RESULTS:     Labs Latest Ref Rng & Units 5/13/2021 5/12/2021 5/11/2021 5/11/2021 5/6/2021 5/5/2021 5/4/2021   WBC 3.6 - 11.0 K/uL 7.4 11. 4(H) 8.0 8.5 3.7 3. 2(L) 3.0(L)   RBC 3.80 - 5.20 M/uL 3.02(L) 3.29(L) 3.89 4.06 3.37(L) 3.48(L) 3.49(L)   Hemoglobin 11.5 - 16.0 g/dL 8. 3(L) 8. 9(L) 10. 5(L) 10. 9(L) 9.3(L) 9.1(L) 9.3(L)   Hematocrit 35.0 - 47.0 % 27. 6(L) 29. 9(L) 35.7 36.8 31. 1(L) 31. 9(L) 31. 5(L)   MCV 80.0 - 99.0 FL 91.4 90.9 91.8 90.6 92.3 91.7 90.3   Platelets 266 - 221 K/uL 130(L) 167 193 248 160 179 169   Lymphocytes 12 - 49 % 5(L) 7(L) 11(L) 15 18 17 24   Monocytes 5 - 13 % 6 5 8 11 10 11 13   Eosinophils 0 - 7 % 0 0 0 0 0 0 0   Basophils 0 - 1 % 0 0 1 1 1 1 0   Bands 0 - 6 % - - - - - - -   Albumin 3.5 - 5.0 g/dL 3.4(L) 3.6 3.9 4.2 3.5 3. 4(L) 3. 2(L)   Calcium 8.5 - 10.1 MG/DL 9.1 9.1 9.3 10.0 9.0 9.1 9.0   Glucose 65 - 100 mg/dL 110(H) 104(H) 172(H) 172(H) 136(H) 107(H) 83   BUN 6 - 20 MG/DL 45(H) 27(H) 49(H) 47(H) 42(H) 34(H) 23(H)   Creatinine 0.55 - 1.02 MG/DL 4.78(H) 3.67(H) 6.51(H) 6.50(H) 3.91(H) 3.47(H) 2.50(H)   Sodium 136 - 145 mmol/L 137 138 137 135(L) 137 138 137   Potassium 3.5 - 5.1 mmol/L 4.2 3.8 4.0 4.3 4.6 4.3 4.1   TSH 0.36 - 3.74 uIU/mL - - - - - - -   LDH 81 - 246 U/L 156 194 - 402(H) 231 215 204   Some recent data might be hidden     Lab Results   Component Value Date/Time    TSH 5.36 (H) 04/24/2021 04:32 AM    TSH 3.99 (H) 04/17/2021 04:54 AM    TSH 2.980 10/01/2020 12:00 AM       ALLERGY:  Allergies   Allergen Reactions    Benzodiazepines Other (comments)     Respiratory issues        CURRENT MEDICATIONS:    Current Facility-Administered Medications:     albumin human 25% (BUMINATE) solution 50 g, 50 g, IntraVENous, DIALYSIS PRN, Jaspreet Mendoza MD    Vancomycin random level ~ 10 am  (prior to dialysis), , Other, ONCE, Dennis Cortez MD    warfarin (COUMADIN) tablet 4 mg, 4 mg, Oral, ONCE, Ester Zhou MD    heparin (porcine) 1,000 unit/mL injection 1,900 Units, 1,900 Units, InterCATHeter, DIALYSIS PRN **AND** heparin (porcine) 1,000 unit/mL injection 1,900 Units, 1,900 Units, InterCATHeter, DIALYSIS PRN, Jaspreet Mendoza MD    hydrALAZINE (APRESOLINE) 20 mg/mL injection 20 mg, 20 mg, IntraVENous, Q6H PRN, Braxton, Triny B, NP    hydrALAZINE (APRESOLINE) 20 mg/mL injection 10 mg, 10 mg, IntraVENous, Q6H PRN, Braxton, Triny B, NP    OTHER(NON-FORMULARY) 61 mg, 61 mg, Oral, DAILY, Braxton, Triny B, NP    . PHARMACY TO SUBSTITUTE PER PROTOCOL (Reordered from: ranolazine ER (RANEXA) 500 mg SR tablet), , , Per Protocol, Braxton, Triny B, NP    hydrALAZINE (APRESOLINE) tablet 100 mg, 100 mg, Oral, TID, Braxton, Triny B, NP    isosorbide mononitrate ER (IMDUR) tablet 30 mg, 30 mg, Oral, DAILY, Braxton, Triny B, NP    epoetin amalia-epbx (RETACRIT) injection 20,000 Units, 20,000 Units, SubCUTAneous, Q TUE, THU & SAT, Jaspreet Mendoza MD    piperacillin-tazobactam (ZOSYN) 3.375 g in 0.9% sodium chloride (MBP/ADV) 100 mL MBP, 3.375 g, IntraVENous, Q12H, Justine MCGREGOR MD, Last Rate: 25 mL/hr at 05/13/21 0744, 3.375 g at 05/13/21 0744    Vancomycin Pharmacy Dosing, , Other, PRN, Justine Callahan MD    zinc oxide-cod liver oil (DESITIN) 40 % paste, , Topical, Q12H, Lele Iniguez MD    sodium chloride (NS) flush 5-10 mL, 5-10 mL, IntraVENous, PRN, Daniel Jean MD    sodium chloride (NS) flush 5-40 mL, 5-40 mL, IntraVENous, Q8H, Barb Urbina MD, 10 mL at 05/12/21 1521    sodium chloride (NS) flush 5-40 mL, 5-40 mL, IntraVENous, PRN, Barb Urbina MD    acetaminophen (TYLENOL) tablet 650 mg, 650 mg, Oral, Q6H PRN **OR** acetaminophen (TYLENOL) suppository 650 mg, 650 mg, Rectal, Q6H PRN, Barb Urbina MD    polyethylene glycol (MIRALAX) packet 17 g, 17 g, Oral, DAILY PRN, Barb Urbina MD    albuterol-ipratropium (DUO-NEB) 2.5 MG-0.5 MG/3 ML, 3 mL, Nebulization, Q6H PRN, Barb Urbina MD    methylPREDNISolone (PF) (SOLU-MEDROL) injection 40 mg, 40 mg, IntraVENous, DAILY, Barb Urbina MD, 40 mg at 05/12/21 0829    famotidine (PF) (PEPCID) 20 mg in 0.9% sodium chloride 10 mL injection, 20 mg, IntraVENous, DAILY, Barb Urbina MD, 20 mg at 05/12/21 7135    propofol (DIPRIVAN) 10 mg/mL infusion, 0-50 mcg/kg/min, IntraVENous, TITRATE, Barb Urbina MD, Last Rate: 18.3 mL/hr at 05/13/21 0910, 25 mcg/kg/min at 05/13/21 0910    fentaNYL (PF) 1,500 mcg/30 mL (50 mcg/mL) infusion, 0-200 mcg/hr, IntraVENous, TITRATE, Barb Urbina MD, Last Rate: 2 mL/hr at 05/13/21 0740, 100 mcg/hr at 05/13/21 0740    NOREPINephrine (LEVOPHED) 8 mg in 5% dextrose 250mL (32 mcg/mL) infusion, 0.5-16 mcg/min, IntraVENous, TITRATE, Justine Callahan MD, Stopped at 05/12/21 1212    Warfarin - Pharmacy to Dose, , Other, Rx Dosing/Monitoring, Polliard, Romana Blazer, NP    sodium chloride (NS) flush 5-40 mL, 5-40 mL, IntraVENous, Q8H, Triny Limon, NP, 10 mL at 05/13/21 0713    sodium chloride (NS) flush 5-40 mL, 5-40 mL, IntraVENous, PRN, Aníbal Limon NP    nystatin (MYCOSTATIN) 100,000 unit/gram powder, , Topical, PRN, Chas Markham MD    0.9% sodium chloride infusion, 11 mL/hr, IntraVENous, CONTINUOUS, Chas MCGREGOR MD, Last Rate: 11 mL/hr at 05/12/21 0759, 11 mL/hr at 05/12/21 0759    PATIENT CARE TEAM:  Patient Care Team:  He Hendrickson NP as PCP - General (Nurse Practitioner)  Luz Elena Rangel MD (Cardiology)  Beverley Ruggiero MD as Physician (Cardiology)  Precious Bach LPN as Care Coordinator  Nathalie Avina MD (Cardiothoracic Surgery)  Carlos Waller MD (Cardiology)     Thank you for allowing me to participate in this patient's care.     Steve Landeros NP  [address] [place] [address], [address]  Phone: [phone]

## 2021-05-13 NOTE — PROCEDURES
[facility] Acutes  [phone]    Vitals   Pre   Post   Assessment   Pre   Post     Temp  Temp: 97.3 °F (36.3 °C) (05/13/21 0800)  97.7 LOC  Responds to commands Responds to commands   HR   Pulse (Heart Rate): 87 (05/13/21 0800) 96 Lungs   Intubated coughing around vent planned extubation after HD  remains intubated with plans to extubate today   B/P   BP: (map 100 lvad) (05/13/21 0800) 88 doppler map patient has lvad Cardiac   LVAD   LVAD   Resp   Resp Rate: 14 (05/13/21 0800) 15 Skin   Warm and dry  warm and dry    Pain level  Pain Intensity 1: 0 (05/13/21 0400) 0 Edema  Trace BLE and abdominal ascites     Abdominal ascites   Orders:    Duration:   Start:    0800 End:    1100 Total:   3   Dialyzer:   Dialyzer/Set Up Inspection: Revaclear (05/13/21 0800)   K Bath:   Dialysate K (mEq/L): 3.5 (05/13/21 0800)   Ca Bath:   Dialysate CA (mEq/L): 2.5 (05/13/21 0800)   Na/Bicarb:   Dialysate NA (mEq/L): 140 (05/13/21 0800)   Target Fluid Removal:   Goal/Amount of Fluid to Remove (mL): 1000 mL (05/13/21 0800)   Access     Type & Location:   Right tunneled cvc, dressing clean, dry and intact dated05/11/21 Each catheter limb disinfected per p&p, caps removed, hubs disinfected per p&p, aspirated 5 ml each limb, flushed.        Labs     Obtained/Reviewed   Critical Results Called   Date when labs were drawn-  Hgb-    HGB   Date Value Ref Range Status   05/13/2021 8.3 (L) 11.5 - 16.0 g/dL Final     K-    Potassium   Date Value Ref Range Status   05/13/2021 4.2 3.5 - 5.1 mmol/L Final     Ca-   Calcium   Date Value Ref Range Status   05/13/2021 9.1 8.5 - 10.1 MG/DL Final     Bun-   BUN   Date Value Ref Range Status   05/13/2021 45 (H) 6 - 20 MG/DL Final     Comment:     INVESTIGATED PER DELTA CHECK PROTOCOL     Creat-   Creatinine   Date Value Ref Range Status   05/13/2021 4.78 (H) 0.55 - 1.02 MG/DL Final     Comment:     INVESTIGATED PER DELTA CHECK PROTOCOL        Medications/ Blood Products Given Name   Dose   Route and Time     heparin 1:1000 1.9 arterial and 1.9 venous             Blood Volume Processed (BVP):    68 Net Fluid   Removed:  900   Comments   Time Out Done: 1873  Primary Nurse Rpt Pre: Shemar Brock  Primary Nurse Rpt Post: meng edwards rn  Pt Education: procedural  Care Plan: continue current hd plan of care  Tx Summary: LVAD doppler MAP being used  0800 hd initiated as ordered,   0815 MAP of 114 verified with primary nurse  1100 treatment completed, Each dialysis catheter limb disinfected per p&p, blood returned per p&p, each dialysis hub disinfected per p&p, post dialysis catheter dwell instilled per order, and caps applied. All dialysis related medications have been reviewed. Admiting Diagnosis: sepsis  Pt's previous clinic- n/a  Consent signed - Informed Consent Verified: Yes (05/13/21 0800)  Hepatitis Status-  04/21/21 neg ag 04/27/21 susceptible connect care   Machine #- Machine Number: [id number] (05/13/21 0800)  Telemetry status- bedside cvicu  Pre-dialysis wt. -

## 2021-05-14 NOTE — PROGRESS NOTES
Clinical Pharmacy Note: IV to PO Automatic Conversion  Please note: Shyanne Tate medication(s) (famotidine) has/have been changed from IV to PO based on the following critiera:    Patient is tolerating oral medications  Patient is tolerating a diet more advanced than clear liquids  Patient is not requiring vasopressors    This IV to PO conversion is based on the P&T approved automatic conversion policy for eligible patients. Please call with questions.

## 2021-05-14 NOTE — PROGRESS NOTES
[facility] VAD/Heart Failure Progress Note    Admit Date: 2021  POD:  * No surgery found *    Procedure:          Subjective:   Extubated; feeling better; good flows; abx's      Objective:   Vitals:  Blood pressure 100/80, pulse 89, temperature 98.1 °F (36.7 °C), resp. rate 21, height 5' 7\" (1.702 m), weight 227 lb 4.7 oz (103.1 kg), SpO2 95 %. Temp (24hrs), Av.1 °F (36.7 °C), Min:97.9 °F (36.6 °C), Max:98.5 °F (36.9 °C)    Hemodynamics:   CO:     CI:     CVP: CVP (mmHg): 10 mmHg (21)   SVR:     PAP Systolic:     PAP Diastolic:     PVR:     GT39:     SCV02:      VAD Interrogation: LVAD (Heartmate)  Pump Speed (RPM): 9400  Pump Flow (LPM): 5.8  Chatter in Lines: No  PI (Pulsitility Index): 4.8  Power: 6.2  MAP: 78   Test: Yes  Back Up  at Bedside & Labeled: Yes  Power Module Test: Yes  Driveline Site Care: No  Driveline Dressing: Clean, Dry, and Intact    EKG/Rhythm:      Extubation Date / Time:     CT Output:     Ventilator:  Ventilator Volumes  Vt Set (ml): 450 ml (21 0750)  Vt Exhaled (Machine Breath) (ml): 473 ml (21 0750)  Vt Spont (ml): 465 ml (21 0133)  Ve Observed (l/min): 10.1 l/min (21 0133)    Oxygen Therapy:  Oxygen Therapy  O2 Sat (%): 95 % (21)  Pulse via Oximetry: 89 beats per minute (21 0351)  O2 Device: Nasal cannula (21)  Skin Assessment: Clean, dry, & intact (21 08)  Skin Protection for O2 Device: No (21 0800)  O2 Flow Rate (L/min): 2 l/min (21 1600)  O2 Temperature: (HME) (21 0328)  FIO2 (%): 40 % (21 1200)  ETCO2 (mmHg): 36 mmHg (21 0200)    CXR:    Admission Weight: Last Weight   Weight: 269 lb 10 oz (122.3 kg) Weight: 227 lb 4.7 oz (103.1 kg)     Intake / Output / Drain:  Current Shift:  0701 -  1900  In: 740.5 [P.O.:240;  I.V.:500.5]  Out: -   Last 24 hrs.:     Intake/Output Summary (Last 24 hours) at 2021 1645  Last data filed at 5/14/2021 1400  Gross per 24 hour   Intake 1053.28 ml   Output 5 ml   Net 1048.28 ml             Recent Labs     05/14/21  0408 05/13/21 0317 05/12/21 0223   CPK 27 25* 61     Recent Labs     05/14/21  0408 05/13/21  1810 05/13/21 0317 05/12/21 0223     --  137 138   K 4.1 4.2 4.2 3.8   CO2 25  --  21 22   BUN 31*  --  45* 27*   CREA 3.67*  --  4.78* 3.67*   GLU 96  --  110* 104*   PHOS 4.5  --  6.0* 3.6   MG 2.8* 2.6* 3.2* 2.8*   WBC 7.8  --  7.4 11.4*   HGB 8.6*  --  8.3* 8.9*   HCT 28.9*  --  27.6* 29.9*   *  --  130* 167     Recent Labs     05/14/21  0922 05/14/21 0408 05/14/21  0132 05/13/21 0317 05/13/21 0317 05/12/21 0223   INR  --  1.4*  --   --  1.4* 1.9*   PTP  --  14.9*  --   --  14.5* 18.9*   APTT 61.1* PLEASE DISREGARD RESULTS 74.5*   < > 32.8* 36.5*    < > = values in this interval not displayed.      No lab exists for component: PBNP        Current Facility-Administered Medications:     warfarin (COUMADIN) tablet 6 mg, 6 mg, Oral, ONCE, Dirk Cockayne, MD    [START ON 5/15/2021] famotidine (PEPCID) tablet 20 mg, 20 mg, Oral, DAILY, Dirk Cockayne, MD    benzocaine-zinc cl-benzalkonium cl (ORAJEL) 20-0.1-0.02 % mucosal gel, , Oral, PRN, Dirk Cockayne, MD    magic mouthwash cpd (without sucralfate), 5 mL, Oral, DAILY PRN, Dirk Cockayne, MD    albumin human 25% (BUMINATE) solution 50 g, 50 g, IntraVENous, DIALYSIS PRN, Jaspreet Mendoza MD    heparin (porcine) 1,000 unit/mL injection 1,900 Units, 1,900 Units, InterCATHeter, DIALYSIS PRN, 1,900 Units at 05/13/21 1041 **AND** heparin (porcine) 1,000 unit/mL injection 1,900 Units, 1,900 Units, InterCATHeter, DIALYSIS PRN, Kylah Maciel MD, 1,900 Units at 05/13/21 1040    hydrALAZINE (APRESOLINE) 20 mg/mL injection 20 mg, 20 mg, IntraVENous, Q6H PRN, Braxton, Triny B, NP    hydrALAZINE (APRESOLINE) 20 mg/mL injection 10 mg, 10 mg, IntraVENous, Q6H PRN, Braxton, Triny B, NP, 10 mg at 05/13/21 1052    ranolazine ER (RANEXA) tablet 1,000 mg, 1,000 mg, Oral, BID, Braxton, Triny B, NP, 1,000 mg at 05/14/21 7705    hydrALAZINE (APRESOLINE) tablet 100 mg, 100 mg, Oral, TID, Braxton, Triny B, NP, 100 mg at 05/14/21 5836    isosorbide mononitrate ER (IMDUR) tablet 30 mg, 30 mg, Oral, DAILY, Braxton, Triny B, NP, 30 mg at 05/14/21 0921    heparin 25,000 units in D5W 250 ml infusion, 9-25 Units/kg/hr, IntraVENous, TITRATE, Braxton, Triny B, NP, Last Rate: 13.5 mL/hr at 05/14/21 1256, 13 Units/kg/hr at 05/14/21 1256    tafamidis cap 61 mg (Patient Supplied), 61 mg, Oral, DAILY, Darlin Villalba MD, 61 mg at 05/14/21 1152    ondansetron (ZOFRAN) injection 4 mg, 4 mg, IntraVENous, Q4H PRN, Armaan Morrow MD    epoetin amalia-epbx (RETACRIT) injection 20,000 Units, 20,000 Units, SubCUTAneous, Q TUE, THU & SAT, Donald Ken MD, 20,000 Units at 05/13/21 2117    piperacillin-tazobactam (ZOSYN) 3.375 g in 0.9% sodium chloride (MBP/ADV) 100 mL MBP, 3.375 g, IntraVENous, Q12H, Fracisco MCGREGOR MD, Last Rate: 25 mL/hr at 05/14/21 0642, 3.375 g at 05/14/21 8282    Vancomycin Pharmacy Dosing, , Other, PRN, Fracisco Gaines MD    zinc oxide-cod liver oil (DESITIN) 40 % paste, , Topical, Q12H, Fracisco MCGREGOR MD, Given at 05/14/21 0921    sodium chloride (NS) flush 5-10 mL, 5-10 mL, IntraVENous, PRN, Paris Campbell MD    sodium chloride (NS) flush 5-40 mL, 5-40 mL, IntraVENous, Q8H, Gladis Booker MD, 10 mL at 05/14/21 1322    sodium chloride (NS) flush 5-40 mL, 5-40 mL, IntraVENous, PRN, Gladis Booker MD    acetaminophen (TYLENOL) tablet 650 mg, 650 mg, Oral, Q6H PRN **OR** acetaminophen (TYLENOL) suppository 650 mg, 650 mg, Rectal, Q6H PRN, Gladis Booker MD    polyethylene glycol [facility]) packet 17 g, 17 g, Oral, DAILY PRN, Gladis Booker MD    albuterol-ipratropium (DUO-NEB) 2.5 MG-0.5 MG/3 ML, 3 mL, Nebulization, Q6H PRN, Gladis Booker MD    methylPREDNISolone (PF) (SOLU-MEDROL) injection 40 mg, 40 mg, IntraVENous, DAILY, Rema Montanez MD, 40 mg at 05/14/21 0972    propofol (DIPRIVAN) 10 mg/mL infusion, 0-50 mcg/kg/min, IntraVENous, TITRATE, Rema Montanez MD, Stopped at 05/13/21 1147    fentaNYL (PF) 1,500 mcg/30 mL (50 mcg/mL) infusion, 0-200 mcg/hr, IntraVENous, TITRATE, Rema Montanez MD, Stopped at 05/13/21 1650    NOREPINephrine (LEVOPHED) 8 mg in 5% dextrose 250mL (32 mcg/mL) infusion, 0.5-16 mcg/min, IntraVENous, TITRATE, Idalia Sears MD, Stopped at 05/12/21 1212    Warfarin - Pharmacy to Dose, , Other, Rx Dosing/Monitoring, Denisse Walter NP    sodium chloride (NS) flush 5-40 mL, 5-40 mL, IntraVENous, Q8H, Everardo Limonin B, NP, 10 mL at 05/14/21 1322    sodium chloride (NS) flush 5-40 mL, 5-40 mL, IntraVENous, PRN, Braxton Triny B, NP    nystatin (MYCOSTATIN) 100,000 unit/gram powder, , Topical, PRN, Idalia Sears MD    0.9% sodium chloride infusion, 11 mL/hr, IntraVENous, CONTINUOUS, Idalia MCGREGOR MD, Last Rate: 11 mL/hr at 05/12/21 0759, 11 mL/hr at 05/12/21 0759       A/P  S/P LVAD - good flows  Renal failure - HD  Urosepsis - Abx's  Need for anticoagulation - coumadin      Risk of morbidity and mortality - high  Medical decision making - high complexity    Signed By: Evens Enriquez MD

## 2021-05-14 NOTE — PROGRESS NOTES
Pharmacist Note  Warfarin Dosing  Consult provided for this 78 y. o.female to manage warfarin for LVAD    INR Goal: 1.8-2.5    Home regimen/ tablet size: 5 mg daily    Drugs that may increase INR: None  Drugs that may decrease INR: None  Other current anticoagulants/ drugs that may increase bleeding risk: heparin  Risk factors: Age > 65  Daily INR ordered: YES    Recent Labs     05/14/21  0408 05/13/21  0317 05/12/21  0223   HGB 8.6* 8.3* 8.9*   INR 1.4* 1.4* 1.9*     Date               INR                  Dose  5/11  3.3  hold    5/12               1.9                    2 mg      5/13                 1.4                   4 mg      5/14                1.4                   6 mg                                                                    Assessment/ Plan: Will order  warfarin 6 mg today     Pharmacy will continue to monitor daily and adjust therapy as indicated. Please contact the pharmacist at j [phone] or  for outpatient recommendations if needed.

## 2021-05-14 NOTE — PROGRESS NOTES
[facility] in [place], [place]  Inpatient Consult Progress Note      Patient name: Harrie Hamman  Patient : 1950  Patient MRN: [id number]  Consulting MD: Akanksha Echeverria MD  Date of service: 21    CHIEF COMPLAINT:  Chief Complaint   Patient presents with    Altered mental status        PLAN:  Continue set speed 9400rpm, low speed 9000rpm   Keep CVP 10-12 goal   1 dose of Albumin today   Cont home dose hydralazine/Imdur  Cont Tafamidis 61mg daily, ok for patient to take her own   Intolerant to BB/ACEi/ARB/ARNI due to aTTR  Intolerant to MRA due to hyperkalemia  Cont Ranexa   Volume management per Nephrology; strict I/O, standing weights  HD today   Repeat EKG today   Extubated yesterday, pulmonary hygiene  Cont Zosyn  ID consult for yeast in urine and sputum   Blood cultures, wound culture NGTD  Coumadin dose per pharmacy; goal INR lowered to 1.8-2.5 due to GIB  Cont heparin gtt until INR at goal   Antibiotics for chronic sternal wound infection- continue Keflex po when appropriate  Cont to hold synthroid for now, recheck TSH before discharge   Bowel regimen  Advance diet when extubated as tolerated   PT/OT when appropriate   Up to date on flu, PNA, and COVID vaccinations      IMPRESSION:  Likely urosepsis  Acute respiratory failure   Chronic RV failure  Coronary artery disease  · [facility] (2016) high grade ramus and small PDA disease, borderline disease of LAD and takeoff of pRCA  Chronic systolic heart failure  · Stage C, NYHA class IV improved to IIIA symptoms with LVAD  · Combined ischemic and non-ischemic cardiomyopathy, 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 Kaveh Brito)  · C/b delayed extubation due to severe COPD  · C/b critical illness polyneuropathy  · C/b prolonged hospitalization post-LVAD, 55 days  · C/b sternal wound infection s/p debridement (by Dr. Payal Del Castillo) s/p wound vac  · C/b sacral ulcer  · Would culture positive for Staph aureus, not MRSA  · C/b LVAD site drainage, improved  S/p CRT-ICD  · ICD fired due to electrolyte imbalance (2011)  H/o breast cancer (1992)   · s/p bilateral mastectomy/chemo and endometrial cancer s/p hysterectomy  · Lymphedema of LLE due to cancer treatment  Severe COPD with FEV1 50%  Depression  Atrial fibrillation  H/o \"two mini strokes\"  S/p fall with hip facture   · Right hip hemmiarthroplasty (5/23/18) by Dr. Miah Gómez)  · S/p removed hip hardwarare due to pain (4/15/19)  COPD severe  CKD, stage 4- on HD  Hyperkalemia, resolved  Pulmonary hypertension  Cardiac risk factors:  · Morbid obesity, Body mass index is 42.29 kg/m². · DM2 insulin dependent  · JED on Trilogy  · HL  Urinary incontinence, severe  · no procedures due to anticoagulation  Endometrial cancer (2002)  HTN  HL    Recent Events:  Extubated  MAPs improved  Tolerating HD  PCT trending down   CVP 9-11     CARDIAC IMAGING:  TTE 5/11/21 LVEF < 15%, LVIDd 5.96cm, TAPSE 0.99cm, RVIDd 4.14cm  TTE 4/19 shows improved LVIDd, 6.68 cm with RVIDd 5.14 cm and TAPSE 1.1 cm   TTE 4/28/21 LVIDd 7.37cm, RVIDd 5.27cm, TAPSE 1.44cm   Echo (9/24/19) LVEF 20-25%, AV opens 1:1, no AR  Echo (5/29/18) LVEF 10-%, ramps study done, report in epic  Echo (1/9/18) ramp study done, LVEDD 7.1cm  LHC (11/9/18) 2 vessel disease with 90% OM, 80% PDA, DSA to PDA branch  RHC 4/20 showed adequate Sal CI 3.0 but severely elevated RA pressure 24 mmHg  CXR negative 4/27/21     INTERVAL HISTORY:  Ms. Alicia Steward is a 78 y.o. Female s/p LVAD implant with HM 2 and ESRD requiring HD that was recently admitted for RV failure and worsening renal function. She was discharged on 5/5/21 to [facility] for rehabilitation before transitioning home to live her with daughter.  In the last few days she had worsening mental status changed, decreased UOP and respiratory distress after possible aspiration- she was intubated and admitted to CV for further management. LVAD INTERROGATION:  Device interrogated in person  Device function normal, normal flow, no events  LVAD   Pump Speed (RPM): 9400  Pump Flow (LPM): 5.4  MAP: 82  PI (Pulsitility Index): 4.6  Power: 6   Test: Yes  Back Up  at Bedside & Labeled: No  Power Module Test: No  Driveline Site Care: No  Driveline Dressing: Clean, Dry, and Intact  Outpatient: No  MAP in Therapeutic Range (Outpatient): Yes  Testing  Alarms Reviewed: Yes  Back up SC speed: 9400  Back up Low Speed Limit: 9000  Emergency Equipment with Patient?: Yes  Emergency procedures reviewed?: Yes  Drive line site inspected?: No  Drive line intergrity inspected?: Yes  Drive line dressing changed?: No    PHYSICAL EXAM:  Visit Vitals  /80 Comment: treatment ended. Blood returned   Pulse 84   Temp 98.2 °F (36.8 °C)   Resp 23   Ht 5' 7\" (1.702 m)   Wt 227 lb 4.7 oz (103.1 kg)   SpO2 94%   BMI 35.60 kg/m²     General: Patient is well developed, well-nourished in no acute distress, sedated   HEENT: Normocephalic and atraumatic. No scleral icterus. Pupils are equal, round and reactive to light and accomodation. No conjunctival injection. Oropharynx is clear. Neck: Supple. No evidence of thyroid enlargements or lymphadenopathy. JVD: Cannot be appreciated   Lungs: Breath sounds are equal and clear bilaterally. No wheezes, rhonchi, or rales. On Vent  Heart: Regular rate and rhythm with normal S1 and S2. No murmurs, gallops or rubs. Abdomen: Soft, no mass or tenderness. No organomegaly or hernia. Bowel sounds present. Genitourinary and rectal: deferred  Extremities: No cyanosis, clubbing, or edema. Neurologic: No focal sensory or motor deficits are noted. Grossly intact. Psychiatric: Awake, alert an doriented x 3. Appropriate mood and affect. Skin: Warm, dry and well perfused. No lesions, nodules or rashes are noted.     REVIEW OF SYSTEMS:  Review of Systems   Constitutional: Positive for malaise/fatigue. Negative for chills and fever. Respiratory: Positive for cough. Negative for shortness of breath. Cardiovascular: Negative for chest pain and leg swelling. Gastrointestinal: Negative for heartburn and nausea. Genitourinary: Negative. Musculoskeletal: Negative. Neurological: Negative. Endo/Heme/Allergies: Negative. Psychiatric/Behavioral: Negative. PAST MEDICAL HISTORY:  Past Medical History:   Diagnosis Date    Asthma     [facility] ([facility]     breast    [facility]     endometrial    Congestive heart failure, unspecified     CRI (chronic renal insufficiency)     Depression     Diabetes (HCC)     Hypertension        PAST SURGICAL HISTORY:  Past Surgical History:   Procedure Laterality Date    HX HERNIA REPAIR      HX HYSTERECTOMY      HX MASTECTOMY      IR INSERT NON TUNL CVC OVER 5 YRS  4/26/2021    IR INSERT TUNL CVC W/O PORT OVER 5 YR  5/5/2021       FAMILY HISTORY:  No family history on file. SOCIAL HISTORY:  Social History     Socioeconomic History    Marital status:      Spouse name: Not on file    Number of children: Not on file    Years of education: Not on file    Highest education level: Not on file   Tobacco Use    Smoking status: Never Smoker    Smokeless tobacco: Never Used   Substance and Sexual Activity    Alcohol use: Not Currently       LABORATORY RESULTS:     Labs Latest Ref Rng & Units 5/14/2021 5/13/2021 5/13/2021 5/12/2021 5/11/2021 5/11/2021 5/6/2021   WBC 3.6 - 11.0 K/uL 7.8 - 7.4 11. 4(H) 8.0 8.5 3.7   RBC 3.80 - 5.20 M/uL 3.14(L) - 3.02(L) 3.29(L) 3.89 4.06 3.37(L)   Hemoglobin 11.5 - 16.0 g/dL 8.6(L) - 8. 3(L) 8. 9(L) 10. 5(L) 10. 9(L) 9.3(L)   Hematocrit 35.0 - 47.0 % 28. 9(L) - 27. 6(L) 29. 9(L) 35.7 36.8 31. 1(L)   MCV 80.0 - 99.0 FL 92.0 - 91.4 90.9 91.8 90.6 92.3   Platelets 905 - 348 K/uL 149(L) - 130(L) 167 193 248 160   Lymphocytes 12 - 49 % 4(L) - 5(L) 7(L) 11(L) 15 18   Monocytes 5 - 13 % 7 - 6 5 8 11 10   Eosinophils 0 - 7 % 0 - 0 0 0 0 0   Basophils 0 - 1 % 0 - 0 0 1 1 1   Bands 0 - 6 % 11(H) - - - - - -   Albumin 3.5 - 5.0 g/dL 3.6 - 3.4(L) 3.6 3.9 4.2 3.5   Calcium 8.5 - 10.1 MG/DL 9.3 - 9.1 9.1 9.3 10.0 9.0   Glucose 65 - 100 mg/dL 96 - 110(H) 104(H) 172(H) 172(H) 136(H)   BUN 6 - 20 MG/DL 31(H) - 45(H) 27(H) 49(H) 47(H) 42(H)   Creatinine 0.55 - 1.02 MG/DL 3.67(H) - 4.78(H) 3.67(H) 6.51(H) 6.50(H) 3.91(H)   Sodium 136 - 145 mmol/L 138 - 137 138 137 135(L) 137   Potassium 3.5 - 5.1 mmol/L 4.1 4.2 4.2 3.8 4.0 4.3 4.6   TSH 0.36 - 3.74 uIU/mL - - - - - - -   LDH 81 - 246 U/L 189 - 156 194 - 402(H) 231   Some recent data might be hidden     Lab Results   Component Value Date/Time    TSH 5.36 (H) 04/24/2021 04:32 AM    TSH 3.99 (H) 04/17/2021 04:54 AM    TSH 2.980 10/01/2020 12:00 AM       ALLERGY:  Allergies   Allergen Reactions    Benzodiazepines Other (comments)     Respiratory issues        CURRENT MEDICATIONS:    Current Facility-Administered Medications:     warfarin (COUMADIN) tablet 6 mg, 6 mg, Oral, ONCE, Luci Allen MD  [employer]  [START ON 5/15/2021] famotidine (PEPCID) tablet 20 mg, 20 mg, Oral, DAILY, Luci Allen MD    albumin human 25% (BUMINATE) solution 50 g, 50 g, IntraVENous, DIALYSIS PRN, Jaspreet Mendoza MD    heparin (porcine) 1,000 unit/mL injection 1,900 Units, 1,900 Units, InterCATHeter, DIALYSIS PRN, 1,900 Units at 05/13/21 1041 **AND** heparin (porcine) 1,000 unit/mL injection 1,900 Units, 1,900 Units, InterCATHeter, DIALYSIS PRN, Reyes Fern, MD, 1,900 Units at 05/13/21 1040    hydrALAZINE (APRESOLINE) 20 mg/mL injection 20 mg, 20 mg, IntraVENous, Q6H PRN, Braxton, Triny B, NP    hydrALAZINE (APRESOLINE) 20 mg/mL injection 10 mg, 10 mg, IntraVENous, Q6H PRN, Braxton, Triny B, NP, 10 mg at 05/13/21 1052    ranolazine ER (RANEXA) tablet 1,000 mg, 1,000 mg, Oral, BID, Braxton, Triny B, NP, 1,000 mg at 05/14/21 0921    hydrALAZINE (APRESOLINE) tablet 100 mg, 100 mg, Oral, TID, Braxton, Triny B, NP, 100 mg at 05/14/21 1184    isosorbide mononitrate ER (IMDUR) tablet 30 mg, 30 mg, Oral, DAILY, Braxton, Triny B, NP, 30 mg at 05/14/21 0921    heparin 25,000 units in D5W 250 ml infusion, 9-25 Units/kg/hr, IntraVENous, TITRATE, Braxton, Triny B, NP, Last Rate: 13.5 mL/hr at 05/14/21 0758, 13 Units/kg/hr at 05/14/21 0758    tafamidis cap 61 mg (Patient Supplied), 61 mg, Oral, DAILY, Kasey Saunders MD    ondansetron [facility]) injection 4 mg, 4 mg, IntraVENous, Q4H PRN, Juan Daniel Morrow MD    epoetin amalia-epbx (RETACRIT) injection 20,000 Units, 20,000 Units, SubCUTAneous, Q TUE, THU & SAT, Kalyn Lerma MD, 20,000 Units at 05/13/21 2117    piperacillin-tazobactam (ZOSYN) 3.375 g in 0.9% sodium chloride (MBP/ADV) 100 mL MBP, 3.375 g, IntraVENous, Q12H, Hansa MCGREGOR MD, Last Rate: 25 mL/hr at 05/14/21 0642, 3.375 g at 05/14/21 4448    Vancomycin Pharmacy Dosing, , Other, PRN, Hansa Chavez MD    zinc oxide-cod liver oil (DESITIN) 40 % paste, , Topical, Q12H, Hansa Chavez MD, Given at 05/14/21 0921    sodium chloride (NS) flush 5-10 mL, 5-10 mL, IntraVENous, PRN, Neida Gould MD    sodium chloride (NS) flush 5-40 mL, 5-40 mL, IntraVENous, Q8H, Nilson Acosta MD, 10 mL at 05/14/21 0644    sodium chloride (NS) flush 5-40 mL, 5-40 mL, IntraVENous, PRN, Nilson Acosta MD    acetaminophen (TYLENOL) tablet 650 mg, 650 mg, Oral, Q6H PRN **OR** acetaminophen (TYLENOL) suppository 650 mg, 650 mg, Rectal, Q6H PRN, Nilson Acosta MD    polyethylene glycol (MIRALAX) packet 17 g, 17 g, Oral, DAILY PRN, Nilson Acosta MD    albuterol-ipratropium (DUO-NEB) 2.5 MG-0.5 MG/3 ML, 3 mL, Nebulization, Q6H PRN, Nilson Acosta MD    methylPREDNISolone (PF) (SOLU-MEDROL) injection 40 mg, 40 mg, IntraVENous, DAILY, Nilson Acosta MD, 40 mg at 05/14/21 0921    propofol (DIPRIVAN) 10 mg/mL infusion, 0-50 mcg/kg/min, IntraVENous, TITRATE, Halina Hatchet, MD, Stopped at 05/13/21 1147    fentaNYL (PF) 1,500 mcg/30 mL (50 mcg/mL) infusion, 0-200 mcg/hr, IntraVENous, TITRATE, Halina Hatchet, MD, Stopped at 05/13/21 1650    NOREPINephrine (LEVOPHED) 8 mg in 5% dextrose 250mL (32 mcg/mL) infusion, 0.5-16 mcg/min, IntraVENous, TITRATE, Ray Cooley MD, Stopped at 05/12/21 1212    Warfarin - Pharmacy to Dose, , Other, Rx Dosing/Monitoring, Jesus George NP    sodium chloride (NS) flush 5-40 mL, 5-40 mL, IntraVENous, Q8H, Triny Limon NP, 10 mL at 05/13/21 2124    sodium chloride (NS) flush 5-40 mL, 5-40 mL, IntraVENous, PRN, Triny Limno NP    nystatin (MYCOSTATIN) 100,000 unit/gram powder, , Topical, PRN, Ray Cooley MD    0.9% sodium chloride infusion, 11 mL/hr, IntraVENous, CONTINUOUS, Ray MCGREGOR MD, Last Rate: 11 mL/hr at 05/12/21 0759, 11 mL/hr at 05/12/21 0759    PATIENT CARE TEAM:  Patient Care Team:  Venus Barth NP as PCP - General (Nurse Practitioner)  Lydia Tse MD (Cardiology)  Lennox Larger, MD as Physician (Cardiology)  Leon Raman LPN as Care Coordinator  Renetta Sanders MD (Cardiothoracic Surgery)  Madeleine Claros MD (Cardiology)     Thank you for allowing me to participate in this patient's care. Adrian Vargas NP  [facility] [address], [address]  Phone: [phone]      [facility] ATTENDING ADDENDUM    Patient was seen and examined in person. Data and notes were reviewed. I have discussed and agree with the plan as noted in the NP note above without further additions.     Tej Figueroa MD PhD  [facility] [phone]

## 2021-05-14 NOTE — PROGRESS NOTES
[facility] VAD/Heart Failure Progress Note    Admit Date: 2021  POD:  * No surgery found *    Procedure:          Subjective:   Remains intubated; following commands; good flows; work on extubation today      Objective:   Vitals:  Blood pressure 100/80, pulse 88, temperature 98.5 °F (36.9 °C), resp. rate 19, height 5' 7\" (1.702 m), weight 227 lb 4.7 oz (103.1 kg), SpO2 97 %. Temp (24hrs), Av.1 °F (36.7 °C), Min:97.8 °F (36.6 °C), Max:98.5 °F (36.9 °C)    Hemodynamics:   CO:     CI:     CVP: CVP (mmHg): 16 mmHg (21 1400)   SVR:     PAP Systolic:     PAP Diastolic:     PVR:     GM26:     SCV02:      VAD Interrogation: LVAD (Heartmate)  Pump Speed (RPM): 9400  Pump Flow (LPM): 5.8  Chatter in Lines: No  PI (Pulsitility Index): 4.8  Power: 6.2  MAP: 78   Test: Yes  Back Up  at Bedside & Labeled: Yes  Power Module Test: Yes  Driveline Site Care: No  Driveline Dressing: Clean, Dry, and Intact    EKG/Rhythm:      Extubation Date / Time:     CT Output:     Ventilator:  Ventilator Volumes  Vt Set (ml): 450 ml (21 0750)  Vt Exhaled (Machine Breath) (ml): 473 ml (21 0750)  Vt Spont (ml): 465 ml (21 0133)  Ve Observed (l/min): 10.1 l/min (21 0133)    Oxygen Therapy:  Oxygen Therapy  O2 Sat (%): 97 % (21 1400)  Pulse via Oximetry: 89 beats per minute (21 0351)  O2 Device: Nasal cannula (21 1200)  Skin Assessment: Clean, dry, & intact (21 0800)  Skin Protection for O2 Device: No (21 0800)  O2 Flow Rate (L/min): 2 l/min (21 1200)  O2 Temperature: (HME) (21 0328)  FIO2 (%): 40 % (21 1200)  ETCO2 (mmHg): 36 mmHg (21 0200)    CXR:    Admission Weight: Last Weight   Weight: 269 lb 10 oz (122.3 kg) Weight: 227 lb 4.7 oz (103.1 kg)     Intake / Output / Drain:  Current Shift:  0701 -  1900  In: 740.5 [P.O.:240;  I.V.:500.5]  Out: -   Last 24 hrs.:     Intake/Output Summary (Last 24 hours) at 5/14/2021 1526  Last data filed at 5/14/2021 1400  Gross per 24 hour   Intake 1089.28 ml   Output 13 ml   Net 1076.28 ml             Recent Labs     05/14/21  0408 05/13/21 0317 05/12/21 0223   CPK 27 25* 61     Recent Labs     05/14/21  0408 05/13/21  1810 05/13/21 0317 05/12/21 0223     --  137 138   K 4.1 4.2 4.2 3.8   CO2 25  --  21 22   BUN 31*  --  45* 27*   CREA 3.67*  --  4.78* 3.67*   GLU 96  --  110* 104*   PHOS 4.5  --  6.0* 3.6   MG 2.8* 2.6* 3.2* 2.8*   WBC 7.8  --  7.4 11.4*   HGB 8.6*  --  8.3* 8.9*   HCT 28.9*  --  27.6* 29.9*   *  --  130* 167     Recent Labs     05/14/21  0922 05/14/21  0408 05/14/21  0132 05/13/21 0317 05/13/21 0317 05/12/21 0223   INR  --  1.4*  --   --  1.4* 1.9*   PTP  --  14.9*  --   --  14.5* 18.9*   APTT 61.1* PLEASE DISREGARD RESULTS 74.5*   < > 32.8* 36.5*    < > = values in this interval not displayed.      No lab exists for component: PBNP        Current Facility-Administered Medications:     warfarin (COUMADIN) tablet 6 mg, 6 mg, Oral, ONCE, Yarelis Cornelius MD    [START ON 5/15/2021] famotidine (PEPCID) tablet 20 mg, 20 mg, Oral, DAILY, Yarelis Cornelius MD    benzocaine-zinc cl-benzalkonium cl (ORAJEL) 20-0.1-0.02 % mucosal gel, , Oral, PRN, Yarelis Cornelius MD    magic mouthwash cpd (without sucralfate), 5 mL, Oral, DAILY PRN, Yarelis Cornelius MD    albumin human 25% (BUMINATE) solution 50 g, 50 g, IntraVENous, DIALYSIS PRN, Jaspreet Mendoza MD    heparin (porcine) 1,000 unit/mL injection 1,900 Units, 1,900 Units, InterCATHeter, DIALYSIS PRN, 1,900 Units at 05/13/21 1041 **AND** heparin (porcine) 1,000 unit/mL injection 1,900 Units, 1,900 Units, InterCATHeter, DIALYSIS PRN, Liliya Lamb MD, 1,900 Units at 05/13/21 1040    hydrALAZINE (APRESOLINE) 20 mg/mL injection 20 mg, 20 mg, IntraVENous, Q6H PRN, Triny Limon, NP    hydrALAZINE (APRESOLINE) 20 mg/mL injection 10 mg, 10 mg, IntraVENous, Q6H PRN, Reece Oliveros, NP, 10 mg at 05/13/21 1052    ranolazine ER (RANEXA) tablet 1,000 mg, 1,000 mg, Oral, BID, Braxton, Triny B, NP, 1,000 mg at 05/14/21 0355    hydrALAZINE (APRESOLINE) tablet 100 mg, 100 mg, Oral, TID, Braxton, Triny B, NP, 100 mg at 05/14/21 2355    isosorbide mononitrate ER (IMDUR) tablet 30 mg, 30 mg, Oral, DAILY, Braxton, Triny B, NP, 30 mg at 05/14/21 0921    heparin 25,000 units in D5W 250 ml infusion, 9-25 Units/kg/hr, IntraVENous, TITRATE, Braxton, Triny B, NP, Last Rate: 13.5 mL/hr at 05/14/21 1256, 13 Units/kg/hr at 05/14/21 1256    tafamidis cap 61 mg (Patient Supplied), 61 mg, Oral, DAILY, Jennifer Wu MD, 61 mg at 05/14/21 1152    ondansetron (ZOFRAN) injection 4 mg, 4 mg, IntraVENous, Q4H PRN, Fracisco Morrow MD    epoetin amalia-epbx (RETACRIT) injection 20,000 Units, 20,000 Units, SubCUTAneous, Q TUE, THU & SAT, Rui Mercado MD, 20,000 Units at 05/13/21 2117    piperacillin-tazobactam (ZOSYN) 3.375 g in 0.9% sodium chloride (MBP/ADV) 100 mL MBP, 3.375 g, IntraVENous, Q12H, Darcy MCGREGOR MD, Last Rate: 25 mL/hr at 05/14/21 0642, 3.375 g at 05/14/21 8358    Vancomycin Pharmacy Dosing, , Other, PRN, Darcy Desir MD    zinc oxide-cod liver oil (DESITIN) 40 % paste, , Topical, Q12H, Darcy MCGREGOR MD, Given at 05/14/21 0921    sodium chloride (NS) flush 5-10 mL, 5-10 mL, IntraVENous, PRN, Celina Gill MD    sodium chloride (NS) flush 5-40 mL, 5-40 mL, IntraVENous, Q8H, Sonia Palacio MD, 10 mL at 05/14/21 1322    sodium chloride (NS) flush 5-40 mL, 5-40 mL, IntraVENous, PRN, Sonia Palacio MD    acetaminophen (TYLENOL) tablet 650 mg, 650 mg, Oral, Q6H PRN **OR** acetaminophen (TYLENOL) suppository 650 mg, 650 mg, Rectal, Q6H PRN, Sonia Palacio MD    polyethylene glycol [facility]) packet 17 g, 17 g, Oral, DAILY PRN, Sonia Palacio MD    albuterol-ipratropium (DUO-NEB) 2.5 MG-0.5 MG/3 ML, 3 mL, Nebulization, Q6H PRN, Sun, Rick Cruz MD    methylPREDNISolone (PF) (SOLU-MEDROL) injection 40 mg, 40 mg, IntraVENous, DAILY, Facundo Brizuela MD, 40 mg at 05/14/21 7411    propofol (DIPRIVAN) 10 mg/mL infusion, 0-50 mcg/kg/min, IntraVENous, TITRATE, Facundo Brizuela MD, Stopped at 05/13/21 1147    fentaNYL (PF) 1,500 mcg/30 mL (50 mcg/mL) infusion, 0-200 mcg/hr, IntraVENous, TITRATE, Facundo Brizuela MD, Stopped at 05/13/21 1650    NOREPINephrine (LEVOPHED) 8 mg in 5% dextrose 250mL (32 mcg/mL) infusion, 0.5-16 mcg/min, IntraVENous, TITRATE, Cecilio Randolph MD, Stopped at 05/12/21 1212    Warfarin - Pharmacy to Dose, , Other, Rx Dosing/Monitoring, Meseret Walter NP    sodium chloride (NS) flush 5-40 mL, 5-40 mL, IntraVENous, Q8H, Triny Limon NP, 10 mL at 05/14/21 1322    sodium chloride (NS) flush 5-40 mL, 5-40 mL, IntraVENous, PRN, Triny Limon, NP    nystatin (MYCOSTATIN) 100,000 unit/gram powder, , Topical, PRN, Cecilio Randolph MD    0.9% sodium chloride infusion, 11 mL/hr, IntraVENous, CONTINUOUS, Cecilio MCGREGOR MD, Last Rate: 11 mL/hr at 05/12/21 0759, 11 mL/hr at 05/12/21 0759    A/P  S/P LVAD - good flows  Renal failure - HD  Urosepsis - Abx's  Need for anticoagulation - coumadin      Risk of morbidity and mortality - high  Medical decision making - high complexity    Signed By: Thiago Birmingham MD

## 2021-05-14 NOTE — PROGRESS NOTES
0800 Bedside and Verbal shift change report given to Charline Loco (oncoming nurse) by Malgorzata Elise RN (offgoing nurse). Report included the following information SBAR, Kardex, Procedure Summary, Intake/Output, MAR, Recent Results and Cardiac Rhythm V paced. 4221 Dr Jeff Petersen at the bedside. Plan: to obtain ECG; consult ID.    1000 Daughter at the bedside. 56 Dr Dakota Anand at the bedside. No new orders received at this time. Uneventful shift. 2000 Bedside and Verbal shift change report given to Malgorzata Elise RN (oncoming nurse) by Reece Gonzalez RN (offgoing nurse). Report included the following information SBAR, Kardex, Procedure Summary, Intake/Output, MAR, Recent Results and Cardiac Rhythm V Paced.

## 2021-05-14 NOTE — PROGRESS NOTES
2000 Assumed care of patient from [facility]    No acute issues overnight. Received telephone ok from TIFFANY Walter from heart failure, ok to check MAP every 2 hours while awake and every 4 hours when asleep    0800 Bedside and Verbal shift change report given to Deena Noonan (oncoming nurse) by Meka Newman (offgoing nurse). Report included the following information SBAR, Kardex, MAR, Recent Results and Cardiac Rhythm paced.

## 2021-05-14 NOTE — PROGRESS NOTES
[facility]    ICU Intensivist- Critical Care Progress Note    Name: Yaniv Chu   : 1950   MRN: [id number]   Admit: 2021  1:26 AM      Diagnosis:     Principal Problem:    Acute encephalopathy     Problem List:    Acute encephalopathy    NICM (nonischemic cardiomyopathy)    LVAD (left ventricular assist device) present    Severe sepsis with acute organ dysfunction    Acute on chronic systolic CHF (congestive heart failure)     Coronary artery disease involving native coronary artery of native heart without angina pectoris    Ulcer of right foot, limited to breakdown of skin    Chronic venous insufficiency    HAIRSTON (dyspnea on exertion)    Incontinence in female    Obesity, morbid    JED on CPAP    Dyspnea    ICU Comprehensive Plan of Care:     Plans for this Shift:   1. Severe sepsis with acute organ dysfunction and encephalopathy- resolving.     2. Nonischemic cardiomyopathy- stable     3. ESRD on hemodialysis- RIJ permacath access       Subjective:     Progress Note:   2021   8:22 PM     Reason for ICU Admission:   <principal problem not specified>     Yaniv Chu is a 78 y.o. with heart failure, CAD, AICD, nonischemic cardiomyopathy, LVAD, ESRD started dialysis , COPD (trilogy), morbid obesity, DM, breast cancer, who was admitted on 2021 from rehab with a diagnosis of acute metabolic encephalopathy, acute on chronic heart failure. Overnight Events:   Uneventful    Past Medical History:      has a past medical history of Asthma, Cancer ([address] Cancer ([address] Congestive heart failure, unspecified, CRI (chronic renal insufficiency), Depression, Diabetes ([address] and Hypertension. Past Surgical History:      has a past surgical history that includes hx mastectomy; hx hysterectomy; hx hernia repair; ir insert non tunl cvc over 5 yrs (2021); and ir insert tunl cvc w/o port over 5 yr (2021).     Current Medications:     Current Facility-Administered Medications Medication Dose Route Frequency    albumin human 25% (BUMINATE) solution 50 g  50 g IntraVENous DIALYSIS PRN    heparin (porcine) 1,000 unit/mL injection 1,900 Units  1,900 Units InterCATHeter DIALYSIS PRN    And    heparin (porcine) 1,000 unit/mL injection 1,900 Units  1,900 Units InterCATHeter DIALYSIS PRN    hydrALAZINE (APRESOLINE) 20 mg/mL injection 20 mg  20 mg IntraVENous Q6H PRN    hydrALAZINE (APRESOLINE) 20 mg/mL injection 10 mg  10 mg IntraVENous Q6H PRN    ranolazine ER (RANEXA) tablet 1,000 mg  1,000 mg Oral BID    hydrALAZINE (APRESOLINE) tablet 100 mg  100 mg Oral TID    isosorbide mononitrate ER (IMDUR) tablet 30 mg  30 mg Oral DAILY    heparin 25,000 units in D5W 250 ml infusion  9-25 Units/kg/hr IntraVENous TITRATE    tafamidis cap 61 mg (Patient Supplied)  61 mg Oral DAILY    ondansetron (ZOFRAN) injection 4 mg  4 mg IntraVENous Q4H PRN    epoetin amalia-epbx (RETACRIT) injection 20,000 Units  20,000 Units SubCUTAneous Q TUE, THU & SAT    piperacillin-tazobactam (ZOSYN) 3.375 g in 0.9% sodium chloride (MBP/ADV) 100 mL MBP  3.375 g IntraVENous Q12H    Vancomycin Pharmacy Dosing   Other PRN    zinc oxide-cod liver oil (DESITIN) 40 % paste   Topical Q12H    sodium chloride (NS) flush 5-10 mL  5-10 mL IntraVENous PRN    sodium chloride (NS) flush 5-40 mL  5-40 mL IntraVENous Q8H    sodium chloride (NS) flush 5-40 mL  5-40 mL IntraVENous PRN    acetaminophen (TYLENOL) tablet 650 mg  650 mg Oral Q6H PRN    Or    acetaminophen (TYLENOL) suppository 650 mg  650 mg Rectal Q6H PRN    polyethylene glycol (MIRALAX) packet 17 g  17 g Oral DAILY PRN    albuterol-ipratropium (DUO-NEB) 2.5 MG-0.5 MG/3 ML  3 mL Nebulization Q6H PRN    methylPREDNISolone (PF) (SOLU-MEDROL) injection 40 mg  40 mg IntraVENous DAILY    famotidine (PF) (PEPCID) 20 mg in 0.9% sodium chloride 10 mL injection  20 mg IntraVENous DAILY    propofol (DIPRIVAN) 10 mg/mL infusion  0-50 mcg/kg/min IntraVENous TITRATE    fentaNYL (PF) 1,500 mcg/30 mL (50 mcg/mL) infusion  0-200 mcg/hr IntraVENous TITRATE    NOREPINephrine (LEVOPHED) 8 mg in 5% dextrose 250mL (32 mcg/mL) infusion  0.5-16 mcg/min IntraVENous TITRATE    Warfarin - Pharmacy to Dose   Other Rx Dosing/Monitoring    sodium chloride (NS) flush 5-40 mL  5-40 mL IntraVENous Q8H    sodium chloride (NS) flush 5-40 mL  5-40 mL IntraVENous PRN    nystatin (MYCOSTATIN) 100,000 unit/gram powder   Topical PRN    0.9% sodium chloride infusion  11 mL/hr IntraVENous CONTINUOUS       Allergies/Social/Family History: Allergies   Allergen Reactions    Benzodiazepines Other (comments)     Respiratory issues      Social History     Tobacco Use    Smoking status: Never Smoker    Smokeless tobacco: Never Used   Substance Use Topics    Alcohol use: Not Currently      No family history on file. Review of Systems:     Review of systems not obtained due to patient factors. Objective:   Vital Signs:  Visit Vitals  /80 Comment: treatment ended. Blood returned   Pulse (!) 104   Temp 97.8 °F (36.6 °C)   Resp 21   Ht 5' 7\" (1.702 m)   Wt 103.7 kg (228 lb 9.9 oz)   SpO2 98%   BMI 35.81 kg/m²    O2 Flow Rate (L/min): 4 l/min O2 Device: Nasal cannula Temp (24hrs), Av.7 °F (36.5 °C), Min:97.3 °F (36.3 °C), Max:98.2 °F (36.8 °C)    CVP (mmHg): 7 mmHg (21)      Intake/Output:     Intake/Output Summary (Last 24 hours) at 2021  Last data filed at 2021 1900  Gross per 24 hour   Intake 998.74 ml   Output 988 ml   Net 10.74 ml       Physical Exam:  General:  Alert, cooperative, well noursished, well developed, appears stated age   Eyes:  Sclera anicteric. Pupils equally round and reactive to light. Mouth/Throat: Mucous membranes normal, oral pharynx clear   Neck: Supple   Lungs:   Clear to auscultation bilaterally, good effort   CV:  Regular rate and rhythm,no murmur, click, rub or gallop   Abdomen:   Soft, non-tender.  bowel sounds normal. non-distended Extremities: No cyanosis or edema   Skin: Skin color, texture, turgor normal. no acute rash or lesions   Lymph nodes: Cervical and supraclavicular normal   Musculoskeletal: No swelling or deformity         LABS AND  DATA: Personally reviewed  Recent Labs     05/13/21 0317 05/12/21 0223   WBC 7.4 11.4*   HGB 8.3* 8.9*   HCT 27.6* 29.9*   * 167     Recent Labs     05/13/21  1810 05/13/21 0317 05/12/21 0223   NA  --  137 138   K 4.2 4.2 3.8   CL  --  103 104   CO2  --  21 22   BUN  --  45* 27*   CREA  --  4.78* 3.67*   GLU  --  110* 104*   CA  --  9.1 9.1   MG 2.6* 3.2* 2.8*   PHOS  --  6.0* 3.6     Recent Labs     05/13/21 0317 05/12/21 0223   AP 48 49   TP 7.7 7.9   ALB 3.4* 3.6   GLOB 4.3* 4.3*     Recent Labs     05/13/21  1706 05/13/21 0317 05/12/21 0223   INR  --  1.4* 1.9*   PTP  --  14.5* 18.9*   APTT 40.7* 32.8* 36.5*      Recent Labs     05/13/21  1238 05/12/21  0439 05/11/21 0224   PHI 7.35 7.48* 7.41   PCO2I 49.5* 32.0* 44.8   PO2I 76* 65* 84   FIO2I  --  40 40     Recent Labs     05/13/21 0317 05/12/21 0223 05/11/21 0217 05/11/21 0217   CPK 25* 61   < >  --    TROIQ  --   --   --  0.09*    < > = values in this interval not displayed. Ventilator Settings:  Mode Rate Tidal Volume Pressure FiO2 PEEP   Spontaneous   450 ml  5 cm H2O 40 % 5 cm H20     Peak airway pressure: 25 cm H2O    Minute ventilation: 6.61 l/min        MEDS: Reviewed    RADIOLOGY:  No results found. Assessment:     Hospital Problems  Date Reviewed: 4/25/2021          Codes Class Noted POA    Acute encephalopathy ICD-10-CM: G93.40  ICD-9-CM: 348.30  5/11/2021 Unknown            Multidisciplinary Rounds Completed: Yes     ABCDEF Bundle/Checklist  Pain Medications: None  Target RASS: 0 - Alert & Calm - Spontaneously pays attention to caregiver  Sedation Medications: None  CAM-ICU:  Negative  Discussed Plan of Care (goals of care):  Yes  Addressed Code Status: Full Code     CARDIOVASCULAR  Cardiac Gtts: None  SBP Goal of: > 90 mmHg  MAP Goal of: > 65 mmHg  Transfusion Trigger (Hgb): <7 g/dL     ANTIBIOTICS  Antibiotics:  Vancomycin  Zosyn     T/L/D  Tubes: None  Lines: Peripheral IV and Central Line  Drains: None     SPECIAL EQUIPMENT  IHD and LVAD     DISPOSITION  Stay in ICU     CRITICAL CARE CONSULTANT NOTE  I provided a face-to-face bedside physician/patient encounter, greater than the usual and customary amount normally needed, due to the moderate complexity of medical decision-making required.     I reviewed and interpreted patient data including clinical events, labs, images, vital signs, I/O's, and examined patient.       I have actively participated in multi-disciplinary discussions (Thoracic Surgery, Nephrology, CVICU Nursing Staff) regarding the case in formulating an optimal therapeutic plan, and effecting a management strategy for this patient.     NOTE OF PERSONAL INVOLVEMENT IN CARE   This patient has a moderate probability of imminent, clinically significant deterioration, which requires the highest level of preparedness to intervene urgently. I participated in the decision-making and personally managed, or directed the management of, a myriad of life and organ supporting interventions which required my frequent-interval clinical reassessments, in order to treat or prevent imminent deterioration.     I personally spent 45 minutes of critical care time. This is time spent at this critically ill patient's bedside actively involved in patient care as well as the coordination of care and discussions with the patient's family. This does not include any procedural time which has been billed separately.       Haja Arthur MD, [address]  5/13/2021

## 2021-05-14 NOTE — PROGRESS NOTES
Comprehensive Nutrition Assessment    Type and Reason for Visit:      Nutrition Recommendations/Plan:    1. Advance diet to regular (adjust texture PRN for mouth sores)  2. Encourage PO with all meals  3. Daily weights     Nutrition Assessment:    Pt admitted for acute encephalopathy. PMHx: heart failure s/p LVAD, COPD, CKD3, pulmonary HTN, Depression, HTN, and HLD, ESRD on HD. Advanced Heart Failure Team following. New to HD last admit, 1st session on 4/26/20. Extubated yesterday. WOCN following for multiple wound POA including chronic sternal wound (see below). Lantus dose reduced today.      Pt & daughter visited today. Still on clear liquids this morning. Mouth sores/pain still an issue with scabs on admit. Daughter reports new dental damage from intubation as well. Will order Orajel and magic mouthwash which pt used last admit. Diet advanced for lunch. Likes boiled eggs, raisin bran, frosted flakes, hamburgers. Severe wt loss of 20% x 6 months. Some likely d/t fluid losses with start of HD but also fair intake and muscle wasting. LE muscle wasting observed. Malnutrition Assessment:  Malnutrition Status:  Severe malnutrition    Context:  Chronic illness     Findings of the 6 clinical characteristics of malnutrition:   Energy Intake:  7 - 75% or less est energy requirements for 1 month or longer  Weight Loss:  7.00 - Greater than 10% over 6 months     Body Fat Loss:  Unable to assess,     Muscle Mass Loss:  1 - Mild muscle mass loss, Calf (gastrocnemius)  Fluid Accumulation:  Unable to assess,     Strength:        Nutritionally Significant Medications: retacrit, pepcid, solu-mderol, zosyn, coumadin,     Estimated Daily Nutrient Needs:  Energy (kcal): 2101-2292kcal(MSJ x 1.2-1-2.2); Weight Used for Energy Requirements: Current(103kg)  Protein (g): 123-144g (1.2-1.4g/kg);  Weight Used for Protein Requirements: Current(103kg)  Fluid (ml/day): 1500 - or per renal; Method Used for Fluid Requirements: Standard renal    Nutrition Related Findings:       BM: 5/13  Edema: 1+ BLLE, 1+ BLUE  Wounds:  (see WOCN notes)       Current Nutrition Therapies:   Diet: clear liquids  Supplements: none    Anthropometric Measures:  · Height:  5' 7\" (170.2 cm)  · Current Body Wt:  103 kg (227 lb 1.2 oz)   · Admission Body Wt:       · Usual Body Wt:        · Ideal Body Wt:  135:  168.2 %  Wt Readings from Last 10 Encounters:   05/14/21 103.1 kg (227 lb 4.7 oz)   05/06/21 111.9 kg (246 lb 11.1 oz)   04/15/21 122.9 kg (271 lb)   04/15/21 122.9 kg (271 lb)   03/16/21 124.7 kg (275 lb)   03/15/21 124.7 kg (275 lb)   02/09/21 127.9 kg (282 lb)   01/07/21 127.9 kg (282 lb)   12/08/20 128.8 kg (284 lb)   11/20/20 128 kg (282 lb 3 oz)     Nutrition Diagnosis:   · Increased nutrient needs related to renal dysfunction(wounds) as evidenced by wounds, dialysis    · Severe malnutrition related to increased demand for energy/nutrients, inadequate protein-energy intake as evidenced by poor intake prior to admission, weight loss greater than or equal to 20% in 1 year, moderate muscle loss    Nutrition Interventions:   Food and/or Nutrient Delivery: Modify current diet  Nutrition Education and Counseling: No recommendations at this time  Coordination of Nutrition Care: Continue to monitor while inpatient    Goals:  Consumption of at least 60% meals in 3-4 days; good protein intake with each meal       Nutrition Monitoring and Evaluation:   Behavioral-Environmental Outcomes: None identified  Food/Nutrient Intake Outcomes: Food and nutrient intake  Physical Signs/Symptoms Outcomes: Weight, Skin, Fluid status or edema, Chewing or swallowing    Discharge Planning:     Too soon to determine     Lucia Whitmore, RD  [address] , Pager #[phone] or via [employer]

## 2021-05-14 NOTE — CONSULTS
Infectious Disease Consult    Today's Date: 5/14/2021   Admit Date: 5/11/2021    Impression:   · Chronic sternal wound infection   · LVAD infection  · ?aspiration event  · Funguria --needs Rx  · Candida from sputum--likely colonizer    Plan:   · IV antibiotic therapy  · Add Eraxis  · PRN this weekend    Anti-infectives:   · Vancomycin   · Zosyn     Subjective:   Date of Consultation:  May 14, 2021  Referring Physician: Dr Alie Aguilar    Patient is a 78 y.o. female admitted with respiratory failure and concerns over aspiration. She is well-known to the ID service for management of chronic sternal wound and LVAD line infection. She has been found to have yeast in her urine. She is on broad antibiotic coverage for sepsis and we are asked to see her in consultation.      Patient Active Problem List   Diagnosis Code    Obesity, morbid ([facility] E66.01    Acute on chronic systolic CHF (congestive heart failure) ([facility]) I50.23    NICM (nonischemic cardiomyopathy) ([facility]) I42.8    Coronary artery disease involving native coronary artery of native heart without angina pectoris I25.10    JED on CPAP G47.33, Z99.89    Chronic venous insufficiency I87.2    Ulcer of right foot, limited to breakdown of skin ([facility]) L97.521    NSVT (nonsustained ventricular tachycardia) ([facility]) I47.2    Anemia D64.9    ROCIO (acute kidney injury) ([facility] N17.9    Coagulopathy ([facility] D68.9    Open wound of left lower leg S81.802A    Incontinence in female R32    LVAD (left ventricular assist device) present [facility]) [address]  discharge follow-up Z09    ICD (implantable cardioverter-defibrillator) battery depletion Z45.02    HAIRSTON (dyspnea on exertion) R06.00    Dyspnea R06.00    Acute encephalopathy G93.40     Past Medical History:   Diagnosis Date    Asthma     Cancer ([facility]     breast    Cancer ([facility]     endometrial    Congestive heart failure, unspecified     CRI (chronic renal insufficiency)     Depression     Diabetes ([facility]     Hypertension       No family history on file. Social History     Tobacco Use    Smoking status: Never Smoker    Smokeless tobacco: Never Used   Substance Use Topics    Alcohol use: Not Currently     Past Surgical History:   Procedure Laterality Date    HX HERNIA REPAIR      HX HYSTERECTOMY      HX MASTECTOMY      IR INSERT NON TUNL CVC OVER 5 YRS  4/26/2021    IR INSERT TUNL CVC W/O PORT OVER 5 YR  5/5/2021      Prior to Admission medications    Medication Sig Start Date End Date Taking? Authorizing Provider   bumetanide (BUMEX) 2 mg tablet Take 1 Tab by mouth three (3) times daily for 30 days. 5/6/21 6/5/21  Marylou Ribeiro MD   isosorbide mononitrate ER (IMDUR) 30 mg tablet Take 1 Tab by mouth daily for 30 days. 5/7/21 6/6/21  Larry Farrar MD   therapeutic multivitamin [facility]) tablet Take 1 Tab by mouth daily for 30 days. 5/7/21 6/6/21  Marylou Ribeiro MD   cephALEXin (KEFLEX) 250 mg capsule Take 1 Cap by mouth two (2) times a day for 30 days. 5/6/21 6/5/21  Marylou Ribeiro MD   warfarin (COUMADIN) 5 mg tablet Take 1 Tab by mouth daily. Take per INR tracker 5/7/21   Larry Farrar MD   warfarin (COUMADIN) 1 mg tablet Take 4 Tabs by mouth daily. 5/7/21   Marylou Ribeiro MD   FeroSul 325 mg (65 mg iron) tablet TAKE 1 TABLET BY MOUTH EVERY MORNING WITH BREAKFAST 4/6/21   Wadell Siemens, MD   ranolazine ER (RANEXA) 500 mg SR tablet TAKE 2 TABLETS BY MOUTH TWICE DAILY 3/16/21   Wadell Siemens, MD   hydrALAZINE (APRESOLINE) 25 mg tablet Take 4 Tabs by mouth three (3) times daily. 2/25/21   Christina Limon NP   insulin detemir (LEVEMIR U-100 INSULIN SC) 20 Units by SubCUTAneous route two (2) times a day. Provider, Historical   PARoxetine (PAXIL) 30 mg tablet Take 20 mg by mouth daily. Provider, Historical   albuterol (PROVENTIL HFA, VENTOLIN HFA, PROAIR HFA) 90 mcg/actuation inhaler Take 2 Puffs by inhalation every four (4) hours as needed for Wheezing.  12/27/20   Tiago Hamburg, Charlotte Copeland MD   tafamidis (Vyndamax) 61 mg cap Take 61 mg by mouth daily. 20   Chun Campos NP   magnesium oxide (MAG-OX) 400 mg tablet Take 1 Tab by mouth daily. 20   Chun Campos NP   budesonide-formoteroL (Symbicort) 160-4.5 mcg/actuation HFAA Take 2 Puffs by inhalation two (2) times daily as needed. Provider, Historical       Allergies   Allergen Reactions    Benzodiazepines Other (comments)     Respiratory issues        Review of Systems:  A comprehensive review of systems was negative except for that written in the History of Present Illness. Objective:     Visit Vitals  /80   Pulse 88   Temp 98.1 °F (36.7 °C)   Resp 25   Ht 5' 7\" (1.702 m)   Wt 103.1 kg (227 lb 4.7 oz)   SpO2 99%   BMI 35.60 kg/m²     Temp (24hrs), Av.1 °F (36.7 °C), Min:97.9 °F (36.6 °C), Max:98.5 °F (36.9 °C)       Lines:  Central Venous Catheter:    and Hemodialysis Catheter:       Physical Exam:  Lungs:  clear to auscultation bilaterally  Heart:  regular rate and rhythm  Abdomen:  soft, non-tender.  Bowel sounds normal. No masses,  no organomegaly  Skin:  Venous stasis changes noted    Data Review:     CBC:  Recent Labs     21   WBC 7.8 7.4 11.4*   GRANS 78* 88* 87*   MONOS 7 6 5   EOS 0 0 0   ANEU 7.0 6.5 9.9*   ABL 0.3* 0.4* 0.8   HGB 8.6* 8.3* 8.9*   HCT 28.9* 27.6* 29.9*   * 130* 167       BMP:  Recent Labs     21  1810 21   CREA 3.67*  --  4.78* 3.67*   BUN 31*  --  45* 27*     --  137 138   K 4.1 4.2 4.2 3.8     --  103 104   CO2   --  21 22   AGAP 11  --  13 12   GLU 96  --  110* 104*       LFTS:  Recent Labs     21  0408 21  0317 21  0223   TBILI 0.9 0.7 0.8   ALT 8* 10* 8*   AP 61 48 49   TP 8.0 7.7 7.9   ALB 3.6 3.4* 3.6       Microbiology:     All Micro Results     Procedure Component Value Units Date/Time    CULTURE, URINE [[id number]]  (Abnormal) Collected: 05/11/21 0357    Order Status: Completed Specimen: Urine Updated: 05/14/21 1229     Special Requests: NO SPECIAL REQUESTS        [place] Count --        >100,000  COLONIES/mL       Culture result: CANDIDA ALBICANS       CULTURE, Garry Quintero STAIN [[id number]]  (Abnormal) Collected: 05/12/21 0429    Order Status: Completed Specimen: Wound from Abdomen Updated: 05/14/21 1047     Special Requests: NO SPECIAL REQUESTS        GRAM STAIN NO WBC'S SEEN         NO ORGANISMS SEEN        Culture result: RARE DIPHTHEROIDS       CULTURE, URINE [[id number]]  (Abnormal) Collected: 05/13/21 1129    Order Status: Completed Specimen: Cath Urine Updated: 05/14/21 0926     Special Requests: NO SPECIAL REQUESTS        [place] Count --        >100,000  COLONIES/mL       Culture result:       YEAST IDENTIFICATION TO FOLLOW          CULTURE, BLOOD, PAIRED [[id number]] Collected: 05/11/21 0227    Order Status: Completed Specimen: Blood Updated: 05/14/21 0732     Special Requests: NO SPECIAL REQUESTS        Culture result: NO GROWTH 3 DAYS       CULTURE, BLOOD, LINE [[id number]] Collected: 05/11/21 1630    Order Status: Completed Specimen: Blood Updated: 05/14/21 0732     Special Requests: NO SPECIAL REQUESTS        Culture result: NO GROWTH 3 DAYS       CULTURE, RESPIRATORY/SPUTUM/BRONCH [place] STAIN [[id number]]  (Abnormal) Collected: 05/11/21 1909    Order Status: Completed Specimen: Sputum,ET Suction Updated: 05/13/21 1056     Special Requests: NO SPECIAL REQUESTS        GRAM STAIN 4+ WBCS SEEN         FEW EPITHELIAL CELLS SEEN               3+ BUDDING YEAST WITH PSEUDOHYPHAE           Culture result:       LIGHT YEAST, (APPARENT CANDIDA ALBICANS)                  LIGHT NORMAL RESPIRATORY BETO          RESPIRATORY VIRUS PANEL W/COVID-19, PCR [[id number]] Collected: 05/11/21 0429    Order Status: Completed Specimen: Nasopharyngeal Updated: 05/11/21 0735     Adenovirus Not detected        Coronavirus 229E Not detected Coronavirus HKU1 Not detected        Coronavirus CVNL63 Not detected        Coronavirus OC43 Not detected        Metapneumovirus Not detected        Rhinovirus and Enterovirus Not detected        Influenza A Not detected        Influenza A, subtype H1 Not detected        Influenza A, subtype H3 Not detected        INFLUENZA A H1N1 PCR Not detected        Influenza B Not detected        Parainfluenza 1 Not detected        Parainfluenza 2 Not detected        Parainfluenza 3 Not detected        Parainfluenza virus 4 Not detected        RSV by PCR Not detected        B. parapertussis, PCR Not detected        Bordetella pertussis - PCR Not detected        Chlamydophila pneumoniae DNA, QL, PCR Not detected        Mycoplasma pneumoniae DNA, QL, PCR Not detected        SARS-CoV-2, PCR Not detected        Comment: This test has been authorized by the FDA under an Emergency Use Authorization (EUA) for use by authorized laboratories. Factsheet for Healthcare Provider: Grovo.co.nz  Factsheet for Patients: FaceAlerta.nz  Methodology: Multiplex PCR, Nucleic Acid Amplification Non-Probe Detection         COVID-19 RAPID TEST [[id number]] Collected: 05/11/21 0335    Order Status: Completed Specimen: Nasopharyngeal Updated: 05/11/21 0414     Specimen source Nasopharyngeal        COVID-19 rapid test Not detected        Comment: Rapid Abbott ID Now       Rapid NAAT:  The specimen is NEGATIVE for SARS-CoV-2, the novel coronavirus associated with COVID-19. Negative results should be treated as presumptive and, if inconsistent with clinical signs and symptoms or necessary for patient management, should be tested with an alternative molecular assay. Negative results do not preclude SARS-CoV-2 infection and should not be used as the sole basis for patient management decisions.        This test has been authorized by the FDA under an Emergency Use Authorization (EUA) for use by authorized laboratories. Fact sheet for Healthcare Providers: ConventionUpdate.co.nz  Fact sheet for Patients: ConventionUpdate.co.nz       Methodology: Isothermal Nucleic Acid Amplification         URINE CULTURE HOLD SAMPLE [[id number]] Collected: 05/11/21 0357    Order Status: Completed Specimen: Urine from Serum Updated: 05/11/21 0401     Urine culture hold       Urine on hold in Microbiology dept for 2 days. If unpreserved urine is submitted, it cannot be used for addtional testing after 24 hours, recollection will be required.                 Imaging:   Reviewed     Signed By: Harrison Copeland MD     May 14, 2021

## 2021-05-14 NOTE — PROGRESS NOTES
[facility]   [address] [facility], [facility] [address], [facility]  Phone: [phone]   [phone]       Nephrology Progress Note  Judith Dust     1950     [id number]  Date of Admission : 5/11/2021 05/14/21    CC: Follow up for ARF    Assessment and Plan   ROCIO on CKD:  - 2/2 progressive CKD and CRS  - dialysis initiated 4/26/21. Has RIJ permacath for access. Cx negative   - Next HD tomorrow   - Out pt dialysis to be set up at [facility]     Sepsis :  UTI :  follow up Cx results. abx per primary team   Blood cx from Permacath : negative so far      Acute Hypoxic Resp failure :  - baseline severe COPD + chronic CHF + morbid obesity/ JED   - Pulm HTN   - extubated 5/13     Chronic systolic CHF, stage C NYHA class IV  Combined ischemic and nonischemic cardiomyopathy  S/p HM 2 LVAD implant 4/15/2017 by Dr. Bc Saldana at [facility]  hx of recurrent VT : off mexiletine  chronic RV failure. 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 - per AHF team      Chronic anemia :   ATTR amyloidosis  - anemia of chronic disease   - continue HEIKE  MWF     Chronic A. fib. History of endometrial Ca       Interval History:  Seen and examined  Extubated yesterday and doing well   Discussed risks of long term dialysis in LVAD pts with daughter yesterday   Denies any N/V/CP/SOB/ abdo pain/ cough     Review of Systems: Pertinent items are noted in HPI.     Current Medications:   Current Facility-Administered Medications   Medication Dose Route Frequency    albumin human 25% (BUMINATE) solution 50 g  50 g IntraVENous DIALYSIS PRN    heparin (porcine) 1,000 unit/mL injection 1,900 Units  1,900 Units InterCATHeter DIALYSIS PRN    And    heparin (porcine) 1,000 unit/mL injection 1,900 Units  1,900 Units InterCATHeter DIALYSIS PRN    hydrALAZINE (APRESOLINE) 20 mg/mL injection 20 mg  20 mg IntraVENous Q6H PRN    hydrALAZINE (APRESOLINE) 20 mg/mL injection 10 mg  10 mg IntraVENous Q6H PRN    ranolazine ER (RANEXA) tablet 1,000 mg  1,000 mg Oral BID    hydrALAZINE (APRESOLINE) tablet 100 mg  100 mg Oral TID    isosorbide mononitrate ER (IMDUR) tablet 30 mg  30 mg Oral DAILY    heparin 25,000 units in D5W 250 ml infusion  9-25 Units/kg/hr IntraVENous TITRATE    tafamidis cap 61 mg (Patient Supplied)  61 mg Oral DAILY    ondansetron (ZOFRAN) injection 4 mg  4 mg IntraVENous Q4H PRN    epoetin amalia-epbx (RETACRIT) injection 20,000 Units  20,000 Units SubCUTAneous Q TUE, THU & SAT    piperacillin-tazobactam (ZOSYN) 3.375 g in 0.9% sodium chloride (MBP/ADV) 100 mL MBP  3.375 g IntraVENous Q12H    Vancomycin Pharmacy Dosing   Other PRN    zinc oxide-cod liver oil (DESITIN) 40 % paste   Topical Q12H    sodium chloride (NS) flush 5-10 mL  5-10 mL IntraVENous PRN    sodium chloride (NS) flush 5-40 mL  5-40 mL IntraVENous Q8H    sodium chloride (NS) flush 5-40 mL  5-40 mL IntraVENous PRN    acetaminophen (TYLENOL) tablet 650 mg  650 mg Oral Q6H PRN    Or    acetaminophen (TYLENOL) suppository 650 mg  650 mg Rectal Q6H PRN    polyethylene glycol (MIRALAX) packet 17 g  17 g Oral DAILY PRN    albuterol-ipratropium (DUO-NEB) 2.5 MG-0.5 MG/3 ML  3 mL Nebulization Q6H PRN    methylPREDNISolone (PF) (SOLU-MEDROL) injection 40 mg  40 mg IntraVENous DAILY    famotidine (PF) (PEPCID) 20 mg in 0.9% sodium chloride 10 mL injection  20 mg IntraVENous DAILY    propofol (DIPRIVAN) 10 mg/mL infusion  0-50 mcg/kg/min IntraVENous TITRATE    fentaNYL (PF) 1,500 mcg/30 mL (50 mcg/mL) infusion  0-200 mcg/hr IntraVENous TITRATE    NOREPINephrine (LEVOPHED) 8 mg in 5% dextrose 250mL (32 mcg/mL) infusion  0.5-16 mcg/min IntraVENous TITRATE    Warfarin - Pharmacy to Dose   Other Rx Dosing/Monitoring    sodium chloride (NS) flush 5-40 mL  5-40 mL IntraVENous Q8H    sodium chloride (NS) flush 5-40 mL  5-40 mL IntraVENous PRN    nystatin (MYCOSTATIN) 100,000 unit/gram powder   Topical PRN    0.9% sodium chloride infusion  11 mL/hr IntraVENous CONTINUOUS      Allergies   Allergen Reactions    Benzodiazepines Other (comments)     Respiratory issues       Objective:  Vitals:    Vitals:    05/14/21 0300 05/14/21 0351 05/14/21 0400 05/14/21 0500   Pulse: 88  (!) 101 86   Resp: 22  29 19   Temp:   97.9 °F (36.6 °C)    TempSrc:       SpO2: 92% 92% 93% 90%   Weight:       Height:         Intake and Output:  No intake/output data recorded. 05/12 0701 - 05/13 1900  In: 4332 [I.V.:2222]  Out: 8362 [Urine:178]    Physical Examination:    General: Obese   HEENT            NAD   Neck:  permacath +  Resp:  Lungs CTA B/L, no wheezing   CV:  RRR,  no murmur or rub,no LE edema  GI:  Soft, NT, + BS  Neurologic:  AAO X 3   Psych:             Normal affect and mood       []    High complexity decision making was performed  []    Patient is at high-risk of decompensation with multiple organ involvement    Lab Data Personally Reviewed: I have reviewed all the pertinent labs, microbiology data and radiology studies during assessment.     Recent Labs     05/14/21 0408 05/13/21 1810 05/13/21 0317 05/12/21 0223     --  137 138   K 4.1 4.2 4.2 3.8     --  103 104   CO2 25  --  21 22   GLU 96  --  110* 104*   BUN 31*  --  45* 27*   CREA 3.67*  --  4.78* 3.67*   CA 9.3  --  9.1 9.1   MG 2.8* 2.6* 3.2* 2.8*   PHOS 4.5  --  6.0* 3.6   ALB 3.6  --  3.4* 3.6   ALT 8*  --  10* 8*   INR 1.4*  --  1.4* 1.9*     Recent Labs     05/14/21 0408 05/13/21 0317 05/12/21 0223   WBC 7.8 7.4 11.4*   HGB 8.6* 8.3* 8.9*   HCT 28.9* 27.6* 29.9*   * 130* 167     No results found for: SDES  Lab Results   Component Value Date/Time    Culture result: CULTURE IN PROGRESS,FURTHER UPDATES TO FOLLOW 05/12/2021 04:29 AM    Culture result: LIGHT YEAST, (APPARENT CANDIDA ALBICANS) (A) 05/11/2021 07:09 PM    Culture result: LIGHT NORMAL RESPIRATORY BETO 05/11/2021 07:09 PM    Culture result: NO GROWTH 2 DAYS 05/11/2021 04:30 PM    Culture result: YEAST IDENTIFICATION TO FOLLOW (A) 05/11/2021 03:57 AM     Recent Results (from the past 24 hour(s))   EKG, 12 LEAD, INITIAL    Collection Time: 05/13/21 11:37 AM   Result Value Ref Range    Ventricular Rate 132 BPM    Atrial Rate 96 BPM    QRS Duration 180 ms    Q-T Interval 490 ms    QTC Calculation (Bezet) 726 ms    Calculated R Axis -73 degrees    Calculated T Axis 106 degrees    Diagnosis       Ventricular paced rhythm  Left axis deviation  When compared with ECG of 11-MAY-2021 06:38,  heart rate has increased  Confirmed by Eleonora Singh M.D., [facility] ([id number]) on 5/13/2021 4:10:34 PM     POC EG7    Collection Time: 05/13/21 12:38 PM   Result Value Ref Range    Calcium, ionized (POC) 1.23 1.12 - 1.32 mmol/L    pH (POC) 7.35 7.35 - 7.45      pCO2 (POC) 49.5 (H) 35.0 - 45.0 MMHG    pO2 (POC) 76 (L) 80 - 100 MMHG    HCO3 (POC) 27.4 (H) 22 - 26 MMOL/L    Base excess (POC) 2 mmol/L    sO2 (POC) 94 92 - 97 %    Site LEFT RADIAL      Device: VENT      Mode CPAP/SPON      PEEP/CPAP (POC) 5 cmH2O    Pressure support 5 cmH2O    Allens test (POC) YES      Specimen type (POC) ARTERIAL     VANCOMYCIN, RANDOM    Collection Time: 05/13/21  2:31 PM   Result Value Ref Range    Vancomycin, random 19.4 UG/ML   PTT    Collection Time: 05/13/21  5:06 PM   Result Value Ref Range    aPTT 40.7 (H) 22.1 - 31.0 sec    aPTT, therapeutic range     58.0 - 77.0 SECS   POTASSIUM    Collection Time: 05/13/21  6:10 PM   Result Value Ref Range    Potassium 4.2 3.5 - 5.1 mmol/L   MAGNESIUM    Collection Time: 05/13/21  6:10 PM   Result Value Ref Range    Magnesium 2.6 (H) 1.6 - 2.4 mg/dL   PTT    Collection Time: 05/14/21  1:32 AM   Result Value Ref Range    aPTT 74.5 (H) 22.1 - 31.0 sec    aPTT, therapeutic range     58.0 - 77.0 SECS   CK    Collection Time: 05/14/21  4:08 AM   Result Value Ref Range    CK 27 26 - 192 U/L   LACTIC ACID    Collection Time: 05/14/21  4:08 AM   Result Value Ref Range    Lactic acid 1.0 0.4 - 2.0 MMOL/L   MAGNESIUM    Collection Time: 05/14/21  4:08 AM   Result Value Ref Range    Magnesium 2.8 (H) 1.6 - 2.4 mg/dL   PHOSPHORUS    Collection Time: 05/14/21  4:08 AM   Result Value Ref Range    Phosphorus 4.5 2.6 - 4.7 MG/DL   PROTHROMBIN TIME + INR    Collection Time: 05/14/21  4:08 AM   Result Value Ref Range    INR 1.4 (H) 0.9 - 1.1      Prothrombin time 14.9 (H) 9.0 - 11.1 sec   MIXING STUDY, APTT    Collection Time: 05/14/21  4:08 AM   Result Value Ref Range    aPTT mixing study          Mixing study interpretation PENDING     aPTT 67.8 (H) 22.1 - 31.0 sec    aPTT 1:1 mix patient PENDING sec    aPTT incubated patient PENDING sec   PROCALCITONIN    Collection Time: 05/14/21  4:08 AM   Result Value Ref Range    Procalcitonin 8.89 ng/mL   LD    Collection Time: 05/14/21  4:08 AM   Result Value Ref Range     81 - 246 U/L   CBC WITH AUTOMATED DIFF    Collection Time: 05/14/21  4:08 AM   Result Value Ref Range    WBC 7.8 3.6 - 11.0 K/uL    RBC 3.14 (L) 3.80 - 5.20 M/uL    HGB 8.6 (L) 11.5 - 16.0 g/dL    HCT 28.9 (L) 35.0 - 47.0 %    MCV 92.0 80.0 - 99.0 FL    MCH 27.4 26.0 - 34.0 PG    MCHC 29.8 (L) 30.0 - 36.5 g/dL    RDW 20.4 (H) 11.5 - 14.5 %    PLATELET 088 (L) 930 - 400 K/uL    MPV 9.8 8.9 - 12.9 FL    NRBC 0.0 0  WBC    ABSOLUTE NRBC 0.00 0.00 - 0.01 K/uL    NEUTROPHILS 78 (H) 32 - 75 %    BAND NEUTROPHILS 11 (H) 0 - 6 %    LYMPHOCYTES 4 (L) 12 - 49 %    MONOCYTES 7 5 - 13 %    EOSINOPHILS 0 0 - 7 %    BASOPHILS 0 0 - 1 %    IMMATURE GRANULOCYTES 0 %    ABS. NEUTROPHILS 7.0 1.8 - 8.0 K/UL    ABS. LYMPHOCYTES 0.3 (L) 0.8 - 3.5 K/UL    ABS. MONOCYTES 0.5 0.0 - 1.0 K/UL    ABS. EOSINOPHILS 0.0 0.0 - 0.4 K/UL    ABS. BASOPHILS 0.0 0.0 - 0.1 K/UL    ABS. IMM.  GRANS. 0.0 K/UL    DF MANUAL      RBC COMMENTS ANISOCYTOSIS  2+        RBC COMMENTS MICROCYTOSIS  1+        RBC COMMENTS ATYPICAL LYMPHOCYTES PRESENT     METABOLIC PANEL, COMPREHENSIVE    Collection Time: 05/14/21  4:08 AM   Result Value Ref Range    Sodium 138 136 - 145 mmol/L    Potassium 4.1 3.5 - 5.1 mmol/L    Chloride 102 97 - 108 mmol/L    CO2 25 21 - 32 mmol/L    Anion gap 11 5 - 15 mmol/L    Glucose 96 65 - 100 mg/dL    BUN 31 (H) 6 - 20 MG/DL    Creatinine 3.67 (H) 0.55 - 1.02 MG/DL    BUN/Creatinine ratio 8 (L) 12 - 20      GFR est AA 15 (L) >60 ml/min/1.73m2    GFR est non-AA 12 (L) >60 ml/min/1.73m2    Calcium 9.3 8.5 - 10.1 MG/DL    Bilirubin, total 0.9 0.2 - 1.0 MG/DL    ALT (SGPT) 8 (L) 12 - 78 U/L    AST (SGOT) 19 15 - 37 U/L    Alk. phosphatase 61 45 - 117 U/L    Protein, total 8.0 6.4 - 8.2 g/dL    Albumin 3.6 3.5 - 5.0 g/dL    Globulin 4.4 (H) 2.0 - 4.0 g/dL    A-G Ratio 0.8 (L) 1.1 - 2.2             Total time spent with patient:  xxx   min. Care Plan discussed with:  Patient     Family      RN      Consulting Physician [id number] [facility],         I have reviewed the flowsheets. Chart and Pertinent Notes have been reviewed. No change in PMH ,family and social history from Consult note.       Alysia Colby MD

## 2021-05-15 PROBLEM — A41.9 SEVERE SEPSIS WITH ACUTE ORGAN DYSFUNCTION (HCC): Status: ACTIVE | Noted: 2021-01-01

## 2021-05-15 PROBLEM — R65.20 SEVERE SEPSIS WITH ACUTE ORGAN DYSFUNCTION (HCC): Status: ACTIVE | Noted: 2021-01-01

## 2021-05-15 NOTE — PROGRESS NOTES
[facility] VAD/Heart Failure Progress Note    Admit Date: 2021  POD:  * No surgery found *    Procedure:          Subjective:   Dyspnea much improved; abx's; HD; good flows     Objective:   Vitals:  Blood pressure 100/80, pulse 79, temperature 98.5 °F (36.9 °C), resp. rate 24, height 5' 7\" (1.702 m), weight 230 lb 13.2 oz (104.7 kg), SpO2 96 %.   Temp (24hrs), Av °F (36.7 °C), Min:97.2 °F (36.2 °C), Max:98.5 °F (36.9 °C)    Hemodynamics:   CO:     CI:     CVP: CVP (mmHg): 11 mmHg (05/15/21 1300)   SVR:     PAP Systolic:     PAP Diastolic:     PVR:     PY56:     SCV02:      VAD Interrogation: LVAD (Heartmate)  Pump Speed (RPM): 9400  Pump Flow (LPM): 5.4  Chatter in Lines: No  PI (Pulsitility Index): 5.1  Power: 60  MAP: 92   Test: Yes  Back Up  at Bedside & Labeled: Yes  Power Module Test: Yes  Driveline Site Care: No  Driveline Dressing: Clean, Dry, and Intact    EKG/Rhythm:      Extubation Date / Time:     CT Output:     Ventilator:  Ventilator Volumes  Vt Set (ml): 450 ml (21 0750)  Vt Exhaled (Machine Breath) (ml): 473 ml (21 0750)  Vt Spont (ml): 465 ml (21 0133)  Ve Observed (l/min): 10.1 l/min (21 0133)    Oxygen Therapy:  Oxygen Therapy  O2 Sat (%): 96 % (05/15/21 1300)  Pulse via Oximetry: 89 beats per minute (21 0351)  O2 Device: Nasal cannula (05/15/21 1200)  Skin Assessment: Clean, dry, & intact (21 0800)  Skin Protection for O2 Device: No (21 0800)  O2 Flow Rate (L/min): 1 l/min (05/15/21 1200)  O2 Temperature: (HME) (21 0328)  FIO2 (%): 40 % (21 1200)  ETCO2 (mmHg): 36 mmHg (21 0200)    CXR:    Admission Weight: Last Weight   Weight: 269 lb 10 oz (122.3 kg) Weight: 230 lb 13.2 oz (104.7 kg)     Intake / Output / Drain:  Current Shift: 05/15 0701 - 05/15 1900  In: 282 [P.O.:50; I.V.:232]  Out: 1000   Last 24 hrs.:     Intake/Output Summary (Last 24 hours) at 5/15/2021 1418  Last data filed at 5/15/2021 1300  Gross per 24 hour   Intake 883.53 ml   Output 1001 ml   Net -117.47 ml             Recent Labs     05/15/21  0314 05/14/21  0408 05/13/21  0317   CPK 20* 27 25*     Recent Labs     05/15/21  0314 05/14/21  0408 05/13/21  1810 05/13/21 0317   * 138  --  137   K 4.4 4.1 4.2 4.2   CO2 25 25  --  21   BUN 49* 31*  --  45*   CREA 4.79* 3.67*  --  4.78*   * 96  --  110*   PHOS 5.1* 4.5  --  6.0*   MG 2.9* 2.8* 2.6* 3.2*   WBC 4.5 7.8  --  7.4   HGB 7.7* 8.6*  --  8.3*   HCT 26.8* 28.9*  --  27.6*   * 149*  --  130*     Recent Labs     05/15/21  0314 05/14/21  0922 05/14/21 0408 05/13/21 0317 05/13/21 0317   INR 1.9*  --  1.4*  --  1.4*   PTP 19.1*  --  14.9*  --  14.5*   APTT 76.7* 61.1* PLEASE DISREGARD RESULTS   < > 32.8*    < > = values in this interval not displayed.      No lab exists for component: PBNP        Current Facility-Administered Medications:     warfarin (COUMADIN) tablet 4 mg, 4 mg, Oral, ONCE, Dillon Walter NP    vancomycin (VANCOCIN) 500 mg in 0.9% sodium chloride (MBP/ADV) 100 mL MBP, 500 mg, IntraVENous, ONCE, Lubna MCGREGOR MD, Last Rate: 50 mL/hr at 05/15/21 1221, 500 mg at 05/15/21 1221    famotidine (PEPCID) tablet 20 mg, 20 mg, Oral, DAILY, Shane Del Cid MD, 20 mg at 05/15/21 0803    benzocaine-zinc cl-benzalkonium cl (ORAJEL) 20-0.1-0.02 % mucosal gel, , Oral, PRN, Shane Pester, MD    magic mouthwash cpd (without sucralfate), 5 mL, Oral, DAILY PRN, Shane Del Cid MD    anidulafungin (ERAXIS) 100 mg in 0.9% sodium chloride 130 mL IVPB, 100 mg, IntraVENous, Q24H, Manjeet Sandoval MD    balsam peru-castor oiL (VENELEX) ointment, , Topical, BID, Shane Del Cid MD, Given at 05/15/21 0803    albumin human 25% (BUMINATE) solution 50 g, 50 g, IntraVENous, DIALYSIS PRN, Jaspreet Mendoza MD    heparin (porcine) 1,000 unit/mL injection 1,900 Units, 1,900 Units, InterCATHeter, DIALYSIS PRN, 1,900 Units at 05/15/21 1115 **AND** heparin (porcine) 1,000 unit/mL injection 1,900 Units, 1,900 Units, InterCATHeter, DIALYSIS PRN, Jaspreet Mendoza MD, 1,900 Units at 05/15/21 1115    hydrALAZINE (APRESOLINE) 20 mg/mL injection 20 mg, 20 mg, IntraVENous, Q6H PRN, Braxton, Triny B, NP    hydrALAZINE (APRESOLINE) 20 mg/mL injection 10 mg, 10 mg, IntraVENous, Q6H PRN, Braxton, Triny B, NP, 10 mg at 05/13/21 1052    ranolazine ER (RANEXA) tablet 1,000 mg, 1,000 mg, Oral, BID, Braxton, Triny B, NP, 1,000 mg at 05/15/21 0803    hydrALAZINE (APRESOLINE) tablet 100 mg, 100 mg, Oral, TID, Braxton, Triny B, NP, 100 mg at 05/14/21 2123    isosorbide mononitrate ER (IMDUR) tablet 30 mg, 30 mg, Oral, DAILY, Braxton, Triny B, NP, 30 mg at 05/15/21 1244    tafamidis cap 61 mg (Patient Supplied), 61 mg, Oral, DAILY, Vivien Cisneros MD, 61 mg at 05/15/21 0803    ondansetron (ZOFRAN) injection 4 mg, 4 mg, IntraVENous, Q4H PRN, Valeriy Morrow MD    epoetin amalia-epbx (RETACRIT) injection 20,000 Units, 20,000 Units, SubCUTAneous, Q TUE, THU & SAT, Ty Zavaleta MD, 20,000 Units at 05/13/21 2117    piperacillin-tazobactam (ZOSYN) 3.375 g in 0.9% sodium chloride (MBP/ADV) 100 mL MBP, 3.375 g, IntraVENous, Q12H, Shira MCGREGOR MD, Last Rate: 25 mL/hr at 05/15/21 0622, 3.375 g at 05/15/21 0622    Vancomycin Pharmacy Dosing, , Other, PRN, Shira Smith MD    zinc oxide-cod liver oil (DESITIN) 40 % paste, , Topical, Q12H, Shira MCGREGOR MD, Given at 05/15/21 0805    sodium chloride (NS) flush 5-10 mL, 5-10 mL, IntraVENous, PRN, Zi Phillip MD    sodium chloride (NS) flush 5-40 mL, 5-40 mL, IntraVENous, Q8H, Aure Pereira MD, 10 mL at 05/15/21 1322    sodium chloride (NS) flush 5-40 mL, 5-40 mL, IntraVENous, PRN, Aure Pereira MD    acetaminophen (TYLENOL) tablet 650 mg, 650 mg, Oral, Q6H PRN **OR** acetaminophen (TYLENOL) suppository 650 mg, 650 mg, Rectal, Q6H PRN, MD Karey Escamilla.Izaiah polyethylene glycol (MIRALAX) packet 17 g, 17 g, Oral, DAILY PRN, Maricarmen Oliver MD    albuterol-ipratropium (DUO-NEB) 2.5 MG-0.5 MG/3 ML, 3 mL, Nebulization, Q6H PRN, Maricarmen Oliver MD    propofol (DIPRIVAN) 10 mg/mL infusion, 0-50 mcg/kg/min, IntraVENous, TITRATE, Maricarmen Oliver MD, Stopped at 05/13/21 1147    fentaNYL (PF) 1,500 mcg/30 mL (50 mcg/mL) infusion, 0-200 mcg/hr, IntraVENous, TITRATE, Maricarmen Oliver MD, Stopped at 05/13/21 1650    NOREPINephrine (LEVOPHED) 8 mg in 5% dextrose 250mL (32 mcg/mL) infusion, 0.5-16 mcg/min, IntraVENous, TITRATE, Clifton Wynn MD, Stopped at 05/12/21 1212    Warfarin - Pharmacy to Dose, , Other, Rx Dosing/Monitoring, Polliard, Clifford Slim, NP    sodium chloride (NS) flush 5-40 mL, 5-40 mL, IntraVENous, Q8H, Braxton, Triny B, NP, 10 mL at 05/15/21 1321    sodium chloride (NS) flush 5-40 mL, 5-40 mL, IntraVENous, PRN, Braxton, Triny B, NP    nystatin (MYCOSTATIN) 100,000 unit/gram powder, , Topical, PRN, Clifton Wynn MD    0.9% sodium chloride infusion, 11 mL/hr, IntraVENous, CONTINUOUS, Clifton MCGREGOR MD, Last Rate: 3 mL/hr at 05/15/21 0741, 3 mL/hr at 05/15/21 0741       A/P  S/P LVAD - good flows  Renal failure - HD  Urosepsis - Abx's  Need for anticoagulation - coumadin      Risk of morbidity and mortality - high  Medical decision making - high complexity    Signed By: Elgin Ventura MD

## 2021-05-15 NOTE — PROGRESS NOTES
[facility]   [address] [facility], [address] [place] [address] [address], [facility]  Phone: [phone]   [phone]       Nephrology Progress Note  Earlene Perez     1950     [id number]  Date of Admission : 5/11/2021  05/15/21    CC: Follow up for ROCIO    Assessment and Plan   ROCIO on CKD:  - 2/2 progressive CKD and CRS  - dialysis initiated 4/26/21. Has RIJ permacath for access. Cx negative   - S/P HD TODAY- 1 kg FLUID removed  - Out pt dialysis to be set up at [facility]     Sepsis :  UTI :  follow up Cx results. abx per primary team   Blood cx from Permacath : negative so far      Acute Hypoxic Resp failure :  - baseline severe COPD + chronic CHF + morbid obesity/ JED   - Pulm HTN   - extubated 5/13     Chronic systolic CHF, stage C NYHA class IV  Combined ischemic and nonischemic cardiomyopathy  S/p HM 2 LVAD implant 4/15/2017 by Dr. Juan M Woodson at [facility]  hx of recurrent VT : off mexiletine  chronic RV failure. 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 - per AHF team      Chronic anemia :   ATTR amyloidosis  - anemia of chronic disease   - continue HEIKE  MWF     Chronic A. fib. History of endometrial Ca       Interval History:  Seen and examined  S/p hd today  No sob    Review of Systems: Pertinent items are noted in HPI.     Current Medications:   Current Facility-Administered Medications   Medication Dose Route Frequency    warfarin (COUMADIN) tablet 4 mg  4 mg Oral ONCE    vancomycin (VANCOCIN) 500 mg in 0.9% sodium chloride (MBP/ADV) 100 mL MBP  500 mg IntraVENous ONCE    famotidine (PEPCID) tablet 20 mg  20 mg Oral DAILY    benzocaine-zinc cl-benzalkonium cl (ORAJEL) 20-0.1-0.02 % mucosal gel   Oral PRN    magic mouthwash cpd (without sucralfate)  5 mL Oral DAILY PRN    anidulafungin (ERAXIS) 100 mg in 0.9% sodium chloride 130 mL IVPB  100 mg IntraVENous Q24H    balsam peru-castor oiL (VENELEX) ointment   Topical BID    albumin human 25% (BUMINATE) solution 50 g  50 g IntraVENous DIALYSIS PRN    heparin (porcine) 1,000 unit/mL injection 1,900 Units  1,900 Units InterCATHeter DIALYSIS PRN    And    heparin (porcine) 1,000 unit/mL injection 1,900 Units  1,900 Units InterCATHeter DIALYSIS PRN    hydrALAZINE (APRESOLINE) 20 mg/mL injection 20 mg  20 mg IntraVENous Q6H PRN    hydrALAZINE (APRESOLINE) 20 mg/mL injection 10 mg  10 mg IntraVENous Q6H PRN    ranolazine ER (RANEXA) tablet 1,000 mg  1,000 mg Oral BID    hydrALAZINE (APRESOLINE) tablet 100 mg  100 mg Oral TID    isosorbide mononitrate ER (IMDUR) tablet 30 mg  30 mg Oral DAILY    tafamidis cap 61 mg (Patient Supplied)  61 mg Oral DAILY    ondansetron (ZOFRAN) injection 4 mg  4 mg IntraVENous Q4H PRN    epoetin amalia-epbx (RETACRIT) injection 20,000 Units  20,000 Units SubCUTAneous Q TUE, THU & SAT    piperacillin-tazobactam (ZOSYN) 3.375 g in 0.9% sodium chloride (MBP/ADV) 100 mL MBP  3.375 g IntraVENous Q12H    Vancomycin Pharmacy Dosing   Other PRN    zinc oxide-cod liver oil (DESITIN) 40 % paste   Topical Q12H    sodium chloride (NS) flush 5-10 mL  5-10 mL IntraVENous PRN    sodium chloride (NS) flush 5-40 mL  5-40 mL IntraVENous Q8H    sodium chloride (NS) flush 5-40 mL  5-40 mL IntraVENous PRN    acetaminophen (TYLENOL) tablet 650 mg  650 mg Oral Q6H PRN    Or    acetaminophen (TYLENOL) suppository 650 mg  650 mg Rectal Q6H PRN    polyethylene glycol (MIRALAX) packet 17 g  17 g Oral DAILY PRN    albuterol-ipratropium (DUO-NEB) 2.5 MG-0.5 MG/3 ML  3 mL Nebulization Q6H PRN    propofol (DIPRIVAN) 10 mg/mL infusion  0-50 mcg/kg/min IntraVENous TITRATE    fentaNYL (PF) 1,500 mcg/30 mL (50 mcg/mL) infusion  0-200 mcg/hr IntraVENous TITRATE    NOREPINephrine (LEVOPHED) 8 mg in 5% dextrose 250mL (32 mcg/mL) infusion  0.5-16 mcg/min IntraVENous TITRATE    Warfarin - Pharmacy to Dose   Other Rx Dosing/Monitoring    sodium chloride (NS) flush 5-40 mL  5-40 mL IntraVENous Q8H    sodium chloride (NS) flush 5-40 mL  5-40 mL IntraVENous PRN    nystatin (MYCOSTATIN) 100,000 unit/gram powder   Topical PRN    0.9% sodium chloride infusion  11 mL/hr IntraVENous CONTINUOUS      Allergies   Allergen Reactions    Benzodiazepines Other (comments)     Respiratory issues       Objective:  Vitals:    Vitals:    05/15/21 1115 05/15/21 1120 05/15/21 1200 05/15/21 1300   Pulse: 78 81 86 79   Resp: 24 26 21 24   Temp:   98.5 °F (36.9 °C)    TempSrc:       SpO2: 100% 100% 98% 96%   Weight:       Height:         Intake and Output:  05/15 0701 - 05/15 1900  In: 282 [P.O.:50; I.V.:232]  Out: 1000   05/13 1901 - 05/15 0700  In: 1342 [P.O.:480; I.V.:862]  Out: 1     Physical Examination:    General: Obese   HEENT            NAD  Neck:  permacath +  Resp:  Lungs clear B/L, no wheezing   CV:  RRR,  S1,S2  GI:  Soft, NT, + BS  Neurologic:  AAO X 3   Psych:             Normal affect and mood       []    High complexity decision making was performed  []    Patient is at high-risk of decompensation with multiple organ involvement    Lab Data Personally Reviewed: I have reviewed all the pertinent labs, microbiology data and radiology studies during assessment.     Recent Labs     05/15/21  0314 05/14/21  0408 05/13/21  1810 05/13/21 0317   * 138  --  137   K 4.4 4.1 4.2 4.2    102  --  103   CO2 25 25  --  21   * 96  --  110*   BUN 49* 31*  --  45*   CREA 4.79* 3.67*  --  4.78*   CA 8.8 9.3  --  9.1   MG 2.9* 2.8* 2.6* 3.2*   PHOS 5.1* 4.5  --  6.0*   ALB 3.2* 3.6  --  3.4*   ALT 9* 8*  --  10*   INR 1.9* 1.4*  --  1.4*     Recent Labs     05/15/21  0314 05/14/21  0408 05/13/21  0317   WBC 4.5 7.8 7.4   HGB 7.7* 8.6* 8.3*   HCT 26.8* 28.9* 27.6*   * 149* 130*     No results found for: SDES  Lab Results   Component Value Date/Time    Culture result: YEAST IDENTIFICATION TO FOLLOW (A) 05/13/2021 11:29 AM    Culture result: RARE DIPHTHEROIDS (A) 05/12/2021 04:29 AM    Culture result: LIGHT YEAST, (APPARENT CANDIDA ALBICANS) (A) 05/11/2021 07:09 PM    Culture result: LIGHT NORMAL RESPIRATORY BETO 05/11/2021 07:09 PM    Culture result: NO GROWTH 4 DAYS 05/11/2021 04:30 PM     Recent Results (from the past 24 hour(s))   EKG, 12 LEAD, INITIAL    Collection Time: 05/14/21  1:37 PM   Result Value Ref Range    Ventricular Rate 99 BPM    Atrial Rate 170 BPM    QRS Duration 162 ms    Q-T Interval 464 ms    QTC Calculation (Bezet) 595 ms    Calculated R Axis -69 degrees    Calculated T Axis 100 degrees    Diagnosis       Ventricular-paced rhythm with fusion complexes  Left axis deviation  When compared with ECG of 13-MAY-2021 11:39,  fusion complexes are now present  Confirmed by Darin Noble M.D., Quincy Baig ([id number]) on 5/15/2021 11:33:57 AM     CK    Collection Time: 05/15/21  3:14 AM   Result Value Ref Range    CK 20 (L) 26 - 192 U/L   LACTIC ACID    Collection Time: 05/15/21  3:14 AM   Result Value Ref Range    Lactic acid 0.7 0.4 - 2.0 MMOL/L   MAGNESIUM    Collection Time: 05/15/21  3:14 AM   Result Value Ref Range    Magnesium 2.9 (H) 1.6 - 2.4 mg/dL   PHOSPHORUS    Collection Time: 05/15/21  3:14 AM   Result Value Ref Range    Phosphorus 5.1 (H) 2.6 - 4.7 MG/DL   PROTHROMBIN TIME + INR    Collection Time: 05/15/21  3:14 AM   Result Value Ref Range    INR 1.9 (H) 0.9 - 1.1      Prothrombin time 19.1 (H) 9.0 - 11.1 sec   MIXING STUDY, APTT    Collection Time: 05/15/21  3:14 AM   Result Value Ref Range    aPTT mixing study          Mixing study interpretation OTHER      aPTT 76.7 (H) 22.1 - 31.0 sec    aPTT 1:1 mix patient 33.0 sec    aPTT incubated patient 36.2 sec   PROCALCITONIN    Collection Time: 05/15/21  3:14 AM   Result Value Ref Range    Procalcitonin 6.19 ng/mL   LD    Collection Time: 05/15/21  3:14 AM   Result Value Ref Range     81 - 246 U/L   CBC WITH AUTOMATED DIFF    Collection Time: 05/15/21  3:14 AM   Result Value Ref Range    WBC 4.5 3.6 - 11.0 K/uL    RBC 2.88 (L) 3.80 - 5.20 M/uL    HGB 7.7 (L) 11.5 - 16.0 g/dL HCT 26.8 (L) 35.0 - 47.0 %    MCV 93.1 80.0 - 99.0 FL    MCH 26.7 26.0 - 34.0 PG    MCHC 28.7 (L) 30.0 - 36.5 g/dL    RDW 20.0 (H) 11.5 - 14.5 %    PLATELET 933 (L) 134 - 400 K/uL    MPV 9.7 8.9 - 12.9 FL    NRBC 0.0 0  WBC    ABSOLUTE NRBC 0.00 0.00 - 0.01 K/uL    NEUTROPHILS 72 32 - 75 %    BAND NEUTROPHILS 8 (H) 0 - 6 %    LYMPHOCYTES 9 (L) 12 - 49 %    MONOCYTES 11 5 - 13 %    EOSINOPHILS 0 0 - 7 %    BASOPHILS 0 0 - 1 %    IMMATURE GRANULOCYTES 0 %    ABS. NEUTROPHILS 3.6 1.8 - 8.0 K/UL    ABS. LYMPHOCYTES 0.4 (L) 0.8 - 3.5 K/UL    ABS. MONOCYTES 0.5 0.0 - 1.0 K/UL    ABS. EOSINOPHILS 0.0 0.0 - 0.4 K/UL    ABS. BASOPHILS 0.0 0.0 - 0.1 K/UL    ABS. IMM. GRANS. 0.0 K/UL    DF MANUAL      PLATELET COMMENTS Large Platelets      RBC COMMENTS ANISOCYTOSIS  2+       METABOLIC PANEL, COMPREHENSIVE    Collection Time: 05/15/21  3:14 AM   Result Value Ref Range    Sodium 135 (L) 136 - 145 mmol/L    Potassium 4.4 3.5 - 5.1 mmol/L    Chloride 101 97 - 108 mmol/L    CO2 25 21 - 32 mmol/L    Anion gap 9 5 - 15 mmol/L    Glucose 118 (H) 65 - 100 mg/dL    BUN 49 (H) 6 - 20 MG/DL    Creatinine 4.79 (H) 0.55 - 1.02 MG/DL    BUN/Creatinine ratio 10 (L) 12 - 20      GFR est AA 11 (L) >60 ml/min/1.73m2    GFR est non-AA 9 (L) >60 ml/min/1.73m2    Calcium 8.8 8.5 - 10.1 MG/DL    Bilirubin, total 0.7 0.2 - 1.0 MG/DL    ALT (SGPT) 9 (L) 12 - 78 U/L    AST (SGOT) 14 (L) 15 - 37 U/L    Alk. phosphatase 47 45 - 117 U/L    Protein, total 7.5 6.4 - 8.2 g/dL    Albumin 3.2 (L) 3.5 - 5.0 g/dL    Globulin 4.3 (H) 2.0 - 4.0 g/dL    A-G Ratio 0.7 (L) 1.1 - 2.2     TSH 3RD GENERATION    Collection Time: 05/15/21  3:14 AM   Result Value Ref Range    TSH 1.60 0.36 - 3.74 uIU/mL           Total time spent with patient:  xxx   min. Care Plan discussed with:  Patient     Family      RN      Consulting Physician [address] [place] [address]        I have reviewed the flowsheets. Chart and Pertinent Notes have been reviewed. No change in PMH ,family and social history from Consult note.       Tollie Olszewski, MD

## 2021-05-15 NOTE — PROGRESS NOTES
Pharmacist Note  Warfarin Dosing  Consult provided for this 78 y. o.female to manage warfarin for LVAD    INR Goal: 1.8-2.5    Home regimen/ tablet size: 5 mg daily    Drugs that may increase INR: None  Drugs that may decrease INR: None  Other current anticoagulants/ drugs that may increase bleeding risk: heparin  Risk factors: Age > 65  Daily INR ordered: YES    Recent Labs     05/15/21  0314 05/14/21  0408 05/13/21  0317   HGB 7.7* 8.6* 8.3*   INR 1.9* 1.4* 1.4*     Date               INR                  Dose  5/11  3.3  hold   5/12                1.9                    2 mg      5/13                 1.4                   4 mg     5/14                 1.4                   6 mg      5/15  1.9  4 mg                                                                  Assessment/ Plan: Will order warfarin 4 mg today     Pharmacy will continue to monitor daily and adjust therapy as indicated. Please contact the pharmacist at j [phone] or  for outpatient recommendations if needed.      Buffy Larsen, PharmD  Clinical Pharmacist, Orthopedics and Med/Surg  [address] [facility] ()

## 2021-05-15 NOTE — PROGRESS NOTES
[facility] in [place], [place]  Inpatient Consult Progress Note      Patient name: Judith Mart  Patient : 1950  Patient MRN: [id number]  Consulting MD: Liz Benson MD  Date of service: 05/15/21    CHIEF COMPLAINT:  Chief Complaint   Patient presents with    Altered mental status        PLAN:  Continue set speed 9400rpm, low speed 9000rpm   Keep CVP 10-12 goal   Cont home dose hydralazine/Imdur  Cont Tafamidis 61mg daily, ok for patient to take her own   Intolerant to BB/ACEi/ARB/ARNI due to aTTR  Intolerant to MRA due to hyperkalemia  Cont Ranexa   Volume management per Nephrology; strict I/O, standing weights  HD today goal 1 L removal   Pulmonary hygiene  Cont Zosyn  Cont Eraxis, appreciate ID recommendations   ID consult for yeast in urine and sputum   Blood cultures NGTD  Drive line culture- rare diphtheroids   Coumadin dose per pharmacy; goal INR lowered to 1.8-2.5 due to GIB  Stop heparin gtt   Antibiotics for chronic sternal wound infection- continue Keflex po when appropriate  TSH 1.60- monitor for now   Bowel regimen  Advance diet   PT/OT consults  Up to date on flu, PNA, and COVID vaccinations      IMPRESSION:  Likely urosepsis  Acute respiratory failure   Chronic RV failure  Coronary artery disease  · [facility] (2016) high grade ramus and small PDA disease, borderline disease of LAD and takeoff of pRCA  Chronic systolic heart failure  · Stage C, NYHA class IV improved to IIIA symptoms with LVAD  · Combined ischemic and non-ischemic cardiomyopathy, 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 Liudmila Parker)  · C/b delayed extubation due to severe COPD  · C/b critical illness polyneuropathy  · C/b prolonged hospitalization post-LVAD, 55 days  · C/b sternal wound infection s/p debridement (by Dr. Bc Saldana) s/p wound vac  · C/b sacral ulcer  · Would culture positive for Staph aureus, not MRSA  · C/b LVAD site drainage, improved  S/p CRT-ICD  · ICD fired due to electrolyte imbalance (2011)  H/o breast cancer (1992)   · s/p bilateral mastectomy/chemo and endometrial cancer s/p hysterectomy  · Lymphedema of LLE due to cancer treatment  Severe COPD with FEV1 50%  Depression  Atrial fibrillation  H/o \"two mini strokes\"  S/p fall with hip facture   · Right hip hemmiarthroplasty (5/23/18) by Dr. Igor Kendall)  · S/p removed hip hardwarare due to pain (4/15/19)  COPD severe  CKD, stage 4- on HD  Hyperkalemia, resolved  Pulmonary hypertension  Cardiac risk factors:  · Morbid obesity, Body mass index is 42.29 kg/m². · DM2 insulin dependent  · JED on Trilogy  · HL  Urinary incontinence, severe  · no procedures due to anticoagulation  Endometrial cancer (2002)  HTN  HL    Recent Events:  HD this morning  Started on Eraxis for C. Albicans in urine  Remains on NC  PCT trending down  Improved mental status      CARDIAC IMAGING:  TTE 5/11/21 LVEF < 15%, LVIDd 5.96cm, TAPSE 0.99cm, RVIDd 4.14cm  TTE 4/19 shows improved LVIDd, 6.68 cm with RVIDd 5.14 cm and TAPSE 1.1 cm   TTE 4/28/21 LVIDd 7.37cm, RVIDd 5.27cm, TAPSE 1.44cm   Echo (9/24/19) LVEF 20-25%, AV opens 1:1, no AR  Echo (5/29/18) LVEF 10-%, ramps study done, report in epic  Echo (1/9/18) ramp study done, LVEDD 7.1cm  LHC (11/9/18) 2 vessel disease with 90% OM, 80% PDA, DSA to PDA branch  RHC 4/20 showed adequate Sal CI 3.0 but severely elevated RA pressure 24 mmHg  CXR negative 4/27/21     INTERVAL HISTORY:  Ms. [place] is a 78 y.o. Female s/p LVAD implant with HM 2 and ESRD requiring HD that was recently admitted for RV failure and worsening renal function. She was discharged on 5/5/21 to [address] [place] [address] for rehabilitation before transitioning home to live her with daughter.  In the last few days she had worsening mental status changed, decreased UOP and respiratory distress after possible aspiration- she was intubated and admitted to CV for further management. LVAD INTERROGATION:  Device interrogated in person  Device function normal, normal flow, no events  LVAD   Pump Speed (RPM): 9400  Pump Flow (LPM): 4.7  MAP: 76  PI (Pulsitility Index): 4.3  Power: 5.5   Test: No  Back Up  at Bedside & Labeled: Yes  Power Module Test: No  Driveline Site Care: No  Driveline Dressing: Clean, Dry, and Intact  Outpatient: No  MAP in Therapeutic Range (Outpatient): Yes  Testing  Alarms Reviewed: Yes  Back up SC speed: 9400  Back up Low Speed Limit: 9000  Emergency Equipment with Patient?: Yes  Emergency procedures reviewed?: Yes  Drive line site inspected?: Yes  Drive line intergrity inspected?: Yes  Drive line dressing changed?: Yes    PHYSICAL EXAM:  Visit Vitals  /80 Comment: treatment ended. Blood returned   Pulse 69   Temp 97.7 °F (36.5 °C)   Resp 18   Ht 5' 7\" (1.702 m)   Wt 230 lb 13.2 oz (104.7 kg)   SpO2 95%   BMI 36.15 kg/m²     General: Patient is well developed, well-nourished in no acute distress, sedated   HEENT: Normocephalic and atraumatic. No scleral icterus. Pupils are equal, round and reactive to light and accomodation. No conjunctival injection. Oropharynx is clear. Neck: Supple. No evidence of thyroid enlargements or lymphadenopathy. JVD: Cannot be appreciated   Lungs: Breath sounds are equal and clear bilaterally. No wheezes, rhonchi, or rales. On Vent  Heart: Regular rate and rhythm with normal S1 and S2. No murmurs, gallops or rubs. Abdomen: Soft, no mass or tenderness. No organomegaly or hernia. Bowel sounds present. Genitourinary and rectal: deferred  Extremities: No cyanosis, clubbing, or edema. Neurologic: No focal sensory or motor deficits are noted. Grossly intact. Psychiatric: Awake, alert an doriented x 3. Appropriate mood and affect. Skin: Warm, dry and well perfused. No lesions, nodules or rashes are noted.     REVIEW OF SYSTEMS:  Review of Systems   Constitutional: Positive for malaise/fatigue. Negative for chills and fever. Respiratory: Positive for cough. Negative for shortness of breath. Cardiovascular: Negative for chest pain and leg swelling. Gastrointestinal: Negative for heartburn and nausea. Genitourinary: Negative. Musculoskeletal: Negative. Neurological: Negative. Endo/Heme/Allergies: Negative. Psychiatric/Behavioral: Negative. PAST MEDICAL HISTORY:  Past Medical History:   Diagnosis Date    Asthma     Cancer ([facility] [address]     breast    Cancer ([facility]     endometrial    Congestive heart failure, unspecified     CRI (chronic renal insufficiency)     Depression     Diabetes (HCC)     Hypertension        PAST SURGICAL HISTORY:  Past Surgical History:   Procedure Laterality Date    HX HERNIA REPAIR      HX HYSTERECTOMY      HX MASTECTOMY      IR INSERT NON TUNL CVC OVER 5 YRS  4/26/2021    IR INSERT TUNL CVC W/O PORT OVER 5 YR  5/5/2021       FAMILY HISTORY:  No family history on file. SOCIAL HISTORY:  Social History     Socioeconomic History    Marital status:      Spouse name: Not on file    Number of children: Not on file    Years of education: Not on file    Highest education level: Not on file   Tobacco Use    Smoking status: Never Smoker    Smokeless tobacco: Never Used   Substance and Sexual Activity    Alcohol use: Not Currently       LABORATORY RESULTS:     Labs Latest Ref Rng & Units 5/15/2021 5/14/2021 5/13/2021 5/13/2021 5/12/2021 5/11/2021 5/11/2021   WBC 3.6 - 11.0 K/uL 4.5 7.8 - 7.4 11. 4(H) 8.0 8.5   RBC 3.80 - 5.20 M/uL 2.88(L) 3.14(L) - 3.02(L) 3.29(L) 3.89 4.06   Hemoglobin 11.5 - 16.0 g/dL 7. 7(L) 8.6(L) - 8. 3(L) 8. 9(L) 10. 5(L) 10. 9(L)   Hematocrit 35.0 - 47.0 % 26. 8(L) 28. 9(L) - 27. 6(L) 29. 9(L) 35.7 36.8   MCV 80.0 - 99.0 FL 93.1 92.0 - 91.4 90.9 91.8 90.6   Platelets 734 - 118 K/uL 126(L) 149(L) - 130(L) 167 193 248   Lymphocytes 12 - 49 % 9(L) 4(L) - 5(L) 7(L) 11(L) 15 Monocytes 5 - 13 % 11 7 - 6 5 8 11   Eosinophils 0 - 7 % 0 0 - 0 0 0 0   Basophils 0 - 1 % 0 0 - 0 0 1 1   Bands 0 - 6 % 8(H) 11(H) - - - - -   Albumin 3.5 - 5.0 g/dL 3.2(L) 3.6 - 3.4(L) 3.6 3.9 4.2   Calcium 8.5 - 10.1 MG/DL 8.8 9.3 - 9.1 9.1 9.3 10.0   Glucose 65 - 100 mg/dL 118(H) 96 - 110(H) 104(H) 172(H) 172(H)   BUN 6 - 20 MG/DL 49(H) 31(H) - 45(H) 27(H) 49(H) 47(H)   Creatinine 0.55 - 1.02 MG/DL 4.79(H) 3.67(H) - 4.78(H) 3.67(H) 6.51(H) 6.50(H)   Sodium 136 - 145 mmol/L 135(L) 138 - 137 138 137 135(L)   Potassium 3.5 - 5.1 mmol/L 4.4 4.1 4.2 4.2 3.8 4.0 4.3   TSH 0.36 - 3.74 uIU/mL 1.60 - - - - - -   LDH 81 - 246 U/L 151 189 - 156 194 - 402(H)   Some recent data might be hidden     Lab Results   Component Value Date/Time    TSH 1.60 05/15/2021 03:14 AM    TSH 5.36 (H) 04/24/2021 04:32 AM    TSH 3.99 (H) 04/17/2021 04:54 AM    TSH 2.980 10/01/2020 12:00 AM       ALLERGY:  Allergies   Allergen Reactions    Benzodiazepines Other (comments)     Respiratory issues        CURRENT MEDICATIONS:    Current Facility-Administered Medications:     famotidine (PEPCID) tablet 20 mg, 20 mg, Oral, DAILY, Luci Allen MD    benzocaine-zinc cl-benzalkonium cl (ORAJEL) 20-0.1-0.02 % mucosal gel, , Oral, PRN, Luci Allen MD    magic mouthwash cpd (without sucralfate), 5 mL, Oral, DAILY PRN, Luci Allen MD    anidulafungin (ERAXIS) 100 mg in 0.9% sodium chloride 130 mL IVPB, 100 mg, IntraVENous, Q24H, Rowles, Puneet, MD    balsam peru-castor oiL (VENELEX) ointment, , Topical, BID, Luci Allen MD    albumin human 25% (BUMINATE) solution 50 g, 50 g, IntraVENous, DIALYSIS PRN, Jaspreet Mendoza MD    heparin (porcine) 1,000 unit/mL injection 1,900 Units, 1,900 Units, InterCATHeter, DIALYSIS PRN, 1,900 Units at 05/13/21 1041 **AND** heparin (porcine) 1,000 unit/mL injection 1,900 Units, 1,900 Units, InterCATHeter, DIALYSIS PRN, Reyes Fern, MD, 1,900 Units at 05/13/21 1040   hydrALAZINE (APRESOLINE) 20 mg/mL injection 20 mg, 20 mg, IntraVENous, Q6H PRN, Braxton, Triny B, NP    hydrALAZINE (APRESOLINE) 20 mg/mL injection 10 mg, 10 mg, IntraVENous, Q6H PRN, Braxton, Triny B, NP, 10 mg at 05/13/21 1052    ranolazine ER (RANEXA) tablet 1,000 mg, 1,000 mg, Oral, BID, Braxton, Triny B, NP, 1,000 mg at 05/14/21 2123    hydrALAZINE (APRESOLINE) tablet 100 mg, 100 mg, Oral, TID, Braxton, Triny B, NP, 100 mg at 05/14/21 2123    isosorbide mononitrate ER (IMDUR) tablet 30 mg, 30 mg, Oral, DAILY, Braxton, Triny B, NP, 30 mg at 05/14/21 0921    heparin 25,000 units in D5W 250 ml infusion, 9-25 Units/kg/hr, IntraVENous, TITRATE, Braxton, Triny B, NP, Last Rate: 13.5 mL/hr at 05/15/21 0350, 13 Units/kg/hr at 05/15/21 0350    tafamidis cap 61 mg (Patient Supplied), 61 mg, Oral, DAILY, Grant Christian MD, 61 mg at 05/14/21 1152    ondansetron (ZOFRAN) injection 4 mg, 4 mg, IntraVENous, Q4H PRN, Valeriy Morrow MD    epoetin amalia-epbx (RETACRIT) injection 20,000 Units, 20,000 Units, SubCUTAneous, Q TUE, THU & SAT, Dat Miranda MD, 20,000 Units at 05/13/21 2117    piperacillin-tazobactam (ZOSYN) 3.375 g in 0.9% sodium chloride (MBP/ADV) 100 mL MBP, 3.375 g, IntraVENous, Q12H, Marielle MCGREGOR MD, Last Rate: 25 mL/hr at 05/15/21 0622, 3.375 g at 05/15/21 0622    Vancomycin Pharmacy Dosing, , Other, PRN, Marielle Peralta MD    zinc oxide-cod liver oil (DESITIN) 40 % paste, , Topical, Q12H, Marielle MCGREGOR MD, Given at 05/14/21 0921    sodium chloride (NS) flush 5-10 mL, 5-10 mL, IntraVENous, PRN, Luis Miguel Carson MD    sodium chloride (NS) flush 5-40 mL, 5-40 mL, IntraVENous, Q8H, Maricarmen Oliver MD, 10 mL at 05/14/21 1322    sodium chloride (NS) flush 5-40 mL, 5-40 mL, IntraVENous, PRN, Maricarmen Oliver MD    acetaminophen (TYLENOL) tablet 650 mg, 650 mg, Oral, Q6H PRN **OR** acetaminophen (TYLENOL) suppository 650 mg, 650 mg, Rectal, Q6H PRN, Sun, Segundo He MD    polyethylene glycol [facility]) packet 17 g, 17 g, Oral, DAILY PRN, Azeem Allen MD    albuterol-ipratropium (DUO-NEB) 2.5 MG-0.5 MG/3 ML, 3 mL, Nebulization, Q6H PRN, Azeem Allen MD    methylPREDNISolone (PF) (SOLU-MEDROL) injection 40 mg, 40 mg, IntraVENous, DAILY, Azeem Allen MD, 40 mg at 05/14/21 9150    propofol (DIPRIVAN) 10 mg/mL infusion, 0-50 mcg/kg/min, IntraVENous, TITRATE, Azeem Allen MD, Stopped at 05/13/21 1147    fentaNYL (PF) 1,500 mcg/30 mL (50 mcg/mL) infusion, 0-200 mcg/hr, IntraVENous, TITRATE, Azeem Allen MD, Stopped at 05/13/21 1650    NOREPINephrine (LEVOPHED) 8 mg in 5% dextrose 250mL (32 mcg/mL) infusion, 0.5-16 mcg/min, IntraVENous, TITRATE, Keerthi Malone MD, Stopped at 05/12/21 1212    Warfarin - Pharmacy to Dose, , Other, Rx Dosing/Monitoring, Allen Walter NP    sodium chloride (NS) flush 5-40 mL, 5-40 mL, IntraVENous, Q8H, Braxton, Erin B, NP, 10 mL at 05/14/21 1322    sodium chloride (NS) flush 5-40 mL, 5-40 mL, IntraVENous, PRN, Braxton Triny B, NP    nystatin (MYCOSTATIN) 100,000 unit/gram powder, , Topical, PRN, Keerthi Malone MD    0.9% sodium chloride infusion, 11 mL/hr, IntraVENous, CONTINUOUS, Keerthi MCGREGOR MD, Last Rate: 11 mL/hr at 05/12/21 0759, 11 mL/hr at 05/12/21 0759    PATIENT CARE TEAM:  Patient Care Team:  Gurvinder Lira NP as PCP - General (Nurse Practitioner)  Rusty Sanchez MD (Cardiology)  Mar Michelle MD as Physician (Cardiology)  Jennifer Renteria LPN as Care Coordinator  Sushil Turner MD (Cardiothoracic Surgery)  Rexanne Krabbe, MD (Cardiology)     Thank you for allowing me to participate in this patient's care.     Aimee Sheridan NP  [address] [place] [address], [address]  Phone: [phone]

## 2021-05-15 NOTE — ROUTINE PROCESS
2000 Assumed care of patient from [facility]    No events overnight, MAP 70-90 all night without PRN medications. Slept majority of night on home trilogy, bled 1-2L of 02 into system for sat 86-88%. Post 02 POX >90    0745 Bedside and Verbal shift change report given to [facility] (oncoming nurse) by Kayla Musa (offgoing nurse). Report included the following information SBAR, Kardex, MAR, Recent Results and Cardiac Rhythm NSR.

## 2021-05-15 NOTE — PROGRESS NOTES
0800 Bedside and Verbal shift change report given to Nargis Gujaardo (oncoming nurse) by Adama Horan, PADILLA (offgoing nurse). Report included the following information SBAR, Kardex, Procedure Summary, Intake/Output, MAR, Recent Results and Cardiac Rhythm V paced w/ PVC's.     0820 HD started. 0930 Daughter at the bedside. [facility] NP at the bedside. Plan: to stop Heparin. 1120 HD completed. 1 kg was removed. 2000 Bedside and Verbal shift change report given to Marybel Mckeon RN (oncoming nurse) by Ancelmo Alexander RN (offgoing nurse). Report included the following information SBAR, Kardex, Procedure Summary, Intake/Output, MAR and Recent Results.

## 2021-05-15 NOTE — PROCEDURES
[facility]  [phone]    Vitals   Pre   Post   Assessment   Pre   Post     Temp  Temp: 98.3 °F (36.8 °C) (05/15/21 0820) 98 LOC  A&Ox3, mildly confused No change   HR   Pulse (Heart Rate): 79 (05/15/21 0820) 81 Lungs   Diminished in bases, 2L O2 Remains on 2L O2   B/P  BP: (88 doppler) (05/13/21 1100) 90 (doppler) Cardiac   S1S2, LVAD LVAD   Resp   Resp Rate: 20 (05/15/21 0820) 26 Skin   Warm and dry Remains intact   Pain level  Pain Intensity 1: 0 (05/15/21 0400)  Edema  No peripheral edema noted None   Orders:    Duration:   Start:   0820 End:   1120 Total: 3 hrs   Dialyzer:   Dialyzer/Set Up Inspection: Jonathan Hinkle (05/15/21 0820)   K Bath:   Dialysate K (mEq/L): 3 (05/15/21 0820)   Ca Bath:   Dialysate CA (mEq/L): 2.5 (05/15/21 0820)   Na/Bicarb:   Dialysate NA (mEq/L): 140 (05/15/21 0820)   Target Fluid Removal:   Goal/Amount of Fluid to Remove (mL): 1000 mL (05/15/21 0820)   Access     Type & Location:   RIJ tunneled CVC, transparent dsg CDI and dated 05/11/21. +aspiration/flush. Running well with  as ordered.    Labs     Obtained/Reviewed   Critical Results Called   Date when labs were drawn-  Hgb-    HGB   Date Value Ref Range Status   05/15/2021 7.7 (L) 11.5 - 16.0 g/dL Final     K-    Potassium   Date Value Ref Range Status   05/15/2021 4.4 3.5 - 5.1 mmol/L Final     Ca-   Calcium   Date Value Ref Range Status   05/15/2021 8.8 8.5 - 10.1 MG/DL Final     Bun-   BUN   Date Value Ref Range Status   05/15/2021 49 (H) 6 - 20 MG/DL Final     Comment:     INVESTIGATED PER DELTA CHECK PROTOCOL     Creat-   Creatinine   Date Value Ref Range Status   05/15/2021 4.79 (H) 0.55 - 1.02 MG/DL Final     Comment:     INVESTIGATED PER DELTA CHECK PROTOCOL      Medications/ Blood Products Given     Name   Dose   Route and Time     heparin 1900 units  1900 units Arterial dwell 1.9 mL  Venous dwell 1.9 mL             Blood Volume Processed (BVP): 64.3L Net Fluid   Removed:  1000 mL Comments   Time Out Done: 346  Primary Nurse Rpt Pre: LUNA Ellis RN  Primary Nurse Rpt Post: LUNA Ellis RN  Pt Education: CVC precautions  Care Plan: HD today, cont TTS  Tx Summary:  0810: Safety checks complete, time out performed. 0820: CVC assessed, no redness, warmth or drainage noted. Transparent dressing and biopatch CDI. Each catheter limb disinfected for 60 seconds per limb with alcohol swabs. Caps removed, dialysis CVC hub scrubbed with Prevantics for 15 seconds, followed by a 5 second dry time per Hospital P&P. Aspirated and discarded 5ml from each lumen. +aspiration/flush. HD initiated. Access visible, lines secure. Medications reviewed. 1120: HD complete. All possible blood rinsed back. Each catheter limb disinfected for 60 seconds per limb with alcohol swabs. Dialysis CVC hubs scrubbed with Prevantics for 15 seconds, followed by a 5 second dry time per Hospital P&P. Saline flushed, hep locked and capped with q-syte and alcohol caps. SBAR given to primary RN.     Admitting Diagnosis: AMS, urosepsis, ROCIO on CKD  Pt's previous clinic: [facility]  Informed Consent Verified: Yes (05/15/21 0820)   Hepatitis Status: HBsAg: Neg (04/21/21) HBsAb: <3/susceptible (04/27/21)  Machine Number: [id number] (05/15/21 0820)   Telemetry status: bedside cardiac monitor  Pre-Dialysis Weight: 104.7 kg (230 lb 13.2 oz) (05/15/21 0820)

## 2021-05-16 NOTE — PROGRESS NOTES
[facility] in [place], [place]  Inpatient Consult Progress Note      Patient name: Roxanne Orta  Patient : 1950  Patient MRN: [id number]  Consulting MD: Lamar Mcpherson MD  Date of service: 21    CHIEF COMPLAINT:  Chief Complaint   Patient presents with    Altered mental status        PLAN:  Continue set speed 9400rpm, low speed 9000rpm   Keep CVP 10-12 goal   Cont home dose hydralazine/Imdur  Cont Tafamidis 61mg daily  Intolerant to BB/ACEi/ARB/ARNI due to aTTR  Intolerant to MRA due to hyperkalemia  Cont Ranexa   Volume management per Nephrology; strict I/O, standing weights  HD tomorrow   Albumin x 1 again this morning  Pulmonary hygiene  Cont Zosyn  Cont Eraxis, appreciate ID recommendations   ID consult for yeast in urine and sputum   Blood cultures negative   Drive line culture- rare diphtheroids   Coumadin dose per pharmacy; goal INR lowered to 1.8-2.5 due to GIB  Antibiotics for chronic sternal wound infection- continue Keflex po when appropriate  Will ask EP to see tomorrow for increased ectopy.    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 Ro Ferguson)  · C/b delayed extubation due to severe COPD  · C/b critical illness polyneuropathy  · C/b prolonged hospitalization post-LVAD, 55 days  · C/b sternal wound infection s/p debridement (by Dr. Porsche White) s/p wound vac  · C/b sacral ulcer  · Would culture positive for Staph aureus, not MRSA  · C/b LVAD site drainage, improved  S/p CRT-ICD  · ICD fired due to electrolyte imbalance (2011)  H/o breast cancer (1992)   · s/p bilateral mastectomy/chemo and endometrial cancer s/p hysterectomy  · Lymphedema of LLE due to cancer treatment  Severe COPD with FEV1 50%  Depression  Atrial fibrillation  H/o \"two mini strokes\"  S/p fall with hip facture   · Right hip hemmiarthroplasty (5/23/18) by Dr. Everett [facility])  · S/p removed hip hardwarare due to pain (4/15/19)  COPD severe  CKD, stage 4- on HD  Hyperkalemia, resolved  Pulmonary hypertension  Cardiac risk factors:  · Morbid obesity, Body mass index is 42.29 kg/m². · DM2 insulin dependent  · JED on Trilogy  · HL  Urinary incontinence, severe  · no procedures due to anticoagulation  Endometrial cancer (2002)  HTN  HL    Recent Events:  Remains on NC  PCT trending down  LA normal   OOB this morning with min assist  Improved mental status      CARDIAC IMAGING:  TTE 5/11/21 LVEF < 15%, LVIDd 5.96cm, TAPSE 0.99cm, RVIDd 4.14cm  TTE 4/19 shows improved LVIDd, 6.68 cm with RVIDd 5.14 cm and TAPSE 1.1 cm   TTE 4/28/21 LVIDd 7.37cm, RVIDd 5.27cm, TAPSE 1.44cm   Echo (9/24/19) LVEF 20-25%, AV opens 1:1, no AR  Echo (5/29/18) LVEF 10-%, ramps study done, report in epic  Echo (1/9/18) ramp study done, LVEDD 7.1cm  LHC (11/9/18) 2 vessel disease with 90% OM, 80% PDA, DSA to PDA branch  RHC 4/20 showed adequate Sal CI 3.0 but severely elevated RA pressure 24 mmHg  CXR negative 4/27/21     INTERVAL HISTORY:  Ms. Teresa Chong is a 78 y.o. Female s/p LVAD implant with HM 2 and ESRD requiring HD that was recently admitted for RV failure and worsening renal function. She was discharged on 5/5/21 to [facility] for rehabilitation before transitioning home to live her with daughter.  In the last few days she had worsening mental status changed, decreased UOP and respiratory distress after possible aspiration- she was intubated and admitted to [facility] for further management. LVAD INTERROGATION:  Device interrogated in person  Device function normal, normal flow, no events  LVAD   Pump Speed (RPM): 9400  Pump Flow (LPM): 4.9  MAP: 80  PI (Pulsitility Index): 6.3  Power: 5.7   Test: No  Back Up  at Bedside & Labeled: Yes  Power Module Test: Yes  Driveline Site Care: No  Driveline Dressing: Clean, Dry, and Intact  Outpatient: No  MAP in Therapeutic Range (Outpatient): Yes  Testing  Alarms Reviewed: Yes  Back up SC speed: 9400  Back up Low Speed Limit: 9000  Emergency Equipment with Patient?: Yes  Emergency procedures reviewed?: Yes  Drive line site inspected?: No  Drive line intergrity inspected?: Yes  Drive line dressing changed?: No    PHYSICAL EXAM:  Visit Vitals  /80 Comment: treatment ended. Blood returned   Pulse 82   Temp 98.2 °F (36.8 °C)   Resp 18   Ht 5' 7\" (1.702 m)   Wt 231 lb 14.8 oz (105.2 kg)   SpO2 95%   BMI 36.32 kg/m²     General: Patient is well developed, well-nourished in no acute distress, sedated   HEENT: Normocephalic and atraumatic. No scleral icterus. Pupils are equal, round and reactive to light and accomodation. No conjunctival injection. Oropharynx is clear. Neck: Supple. No evidence of thyroid enlargements or lymphadenopathy. JVD: Cannot be appreciated   Lungs: Breath sounds are equal and clear bilaterally. No wheezes, rhonchi, or rales. On Vent  Heart: Regular rate and rhythm with normal S1 and S2. No murmurs, gallops or rubs. Abdomen: Soft, no mass or tenderness. No organomegaly or hernia. Bowel sounds present. Genitourinary and rectal: deferred  Extremities: No cyanosis, clubbing, or edema. Neurologic: No focal sensory or motor deficits are noted. Grossly intact. Psychiatric: Awake, alert an doriented x 3. Appropriate mood and affect. Skin: Warm, dry and well perfused. No lesions, nodules or rashes are noted.     REVIEW OF SYSTEMS:  Review of Systems   Constitutional: Positive for malaise/fatigue. Negative for chills and fever. Respiratory: Negative for cough and shortness of breath. Cardiovascular: Negative for chest pain and leg swelling. Gastrointestinal: Negative for heartburn and nausea. Genitourinary: Negative. Musculoskeletal: Negative. Neurological: Negative. Endo/Heme/Allergies: Negative. Psychiatric/Behavioral: Negative. PAST MEDICAL HISTORY:  Past Medical History:   Diagnosis Date    Asthma     Cancer ([facility]     breast    Cancer ([facility]     endometrial    Congestive heart failure, unspecified     CRI (chronic renal insufficiency)     Depression     Diabetes ([facility]     Hypertension     Severe sepsis with acute organ dysfunction ([facility] 5/11/2021       PAST SURGICAL HISTORY:  Past Surgical History:   Procedure Laterality Date    HX HERNIA REPAIR      HX HYSTERECTOMY      HX MASTECTOMY      IR INSERT NON TUNL CVC OVER 5 YRS  4/26/2021    IR INSERT TUNL CVC W/O PORT OVER 5 YR  5/5/2021       FAMILY HISTORY:  No family history on file. SOCIAL HISTORY:  Social History     Socioeconomic History    Marital status:      Spouse name: Not on file    Number of children: Not on file    Years of education: Not on file    Highest education level: Not on file   Tobacco Use    Smoking status: Never Smoker    Smokeless tobacco: Never Used   Substance and Sexual Activity    Alcohol use: Not Currently       LABORATORY RESULTS:     Labs Latest Ref Rng & Units 5/16/2021 5/15/2021 5/14/2021 5/13/2021 5/13/2021 5/12/2021 5/11/2021   WBC 3.6 - 11.0 K/uL 4.1 4.5 7.8 - 7.4 11. 4(H) 8.0   RBC 3.80 - 5.20 M/uL 3.22(L) 2.88(L) 3.14(L) - 3.02(L) 3.29(L) 3.89   Hemoglobin 11.5 - 16.0 g/dL 8.8(L) 7. 7(L) 8.6(L) - 8. 3(L) 8. 9(L) 10. 5(L)   Hematocrit 35.0 - 47.0 % 29. 9(L) 26. 8(L) 28. 9(L) - 27. 6(L) 29. 9(L) 35.7   MCV 80.0 - 99.0 FL 92.9 93.1 92.0 - 91.4 90.9 91.8   Platelets 999 - 853 K/uL 134(L) 126(L) 149(L) - 130(L) 167 193   Lymphocytes 12 - 49 % 9(L) 9(L) 4(L) - 5(L) 7(L) 11(L)   Monocytes 5 - 13 % 11 11 7 - 6 5 8   Eosinophils 0 - 7 % 0 0 0 - 0 0 0   Basophils 0 - 1 % 0 0 0 - 0 0 1   Bands 0 - 6 % - 8(H) 11(H) - - - -   Albumin 3.5 - 5.0 g/dL 3.4(L) 3. 2(L) 3.6 - 3.4(L) 3.6 3.9   Calcium 8.5 - 10.1 MG/DL 9.1 8.8 9.3 - 9.1 9.1 9.3   Glucose 65 - 100 mg/dL 119(H) 118(H) 96 - 110(H) 104(H) 172(H)   BUN 6 - 20 MG/DL 28(H) 49(H) 31(H) - 45(H) 27(H) 49(H)   Creatinine 0.55 - 1.02 MG/DL 3.63(H) 4.79(H) 3.67(H) - 4.78(H) 3.67(H) 6.51(H)   Sodium 136 - 145 mmol/L 136 135(L) 138 - 137 138 137   Potassium 3.5 - 5.1 mmol/L 4.0 4.4 4.1 4.2 4.2 3.8 4.0   TSH 0.36 - 3.74 uIU/mL - 1.60 - - - - -   LDH 81 - 246 U/L 160 151 189 - 156 194 -   Some recent data might be hidden     Lab Results   Component Value Date/Time    TSH 1.60 05/15/2021 03:14 AM    TSH 5.36 (H) 04/24/2021 04:32 AM    TSH 3.99 (H) 04/17/2021 04:54 AM    TSH 2.980 10/01/2020 12:00 AM       ALLERGY:  Allergies   Allergen Reactions    Benzodiazepines Other (comments)     Respiratory issues        CURRENT MEDICATIONS:    Current Facility-Administered Medications:     famotidine (PEPCID) tablet 20 mg, 20 mg, Oral, DAILY, Rexanne Krabbe, MD, 20 mg at 05/15/21 0803    benzocaine-zinc cl-benzalkonium cl (ORAJEL) 20-0.1-0.02 % mucosal gel, , Oral, PRN, Rexanne Krabbe, MD    magic mouthwash cpd (without sucralfate), 5 mL, Oral, DAILY PRN, Rexanne Krabbe, MD    anidulafungin (ERAXIS) 100 mg in 0.9% sodium chloride 130 mL IVPB, 100 mg, IntraVENous, Q24H, Puneet Méndez MD, 100 mg at 05/15/21 2143    balsam peru-castor oiL (VENELEX) ointment, , Topical, BID, Rexanne Krabbe, MD, Given at 05/15/21 1742    albumin human 25% (BUMINATE) solution 50 g, 50 g, IntraVENous, DIALYSIS PRN, Jaspreet Mendoza MD    heparin (porcine) 1,000 unit/mL injection 1,900 Units, 1,900 Units, InterCATHeter, DIALYSIS PRN, 1,900 Units at 05/15/21 1115 **AND** heparin (porcine) 1,000 unit/mL injection 1,900 Units, 1,900 Units, InterCATHeter, DIALYSIS PRN, Jaspreet Mendoza MD, 1,900 Units at 05/15/21 1115    hydrALAZINE (APRESOLINE) 20 mg/mL injection 20 mg, 20 mg, IntraVENous, Q6H PRN, Braxton, Triny B, NP    hydrALAZINE (APRESOLINE) 20 mg/mL injection 10 mg, 10 mg, IntraVENous, Q6H PRN, Braxton, Triny B, NP, 10 mg at 05/13/21 1052    ranolazine ER (RANEXA) tablet 1,000 mg, 1,000 mg, Oral, BID, Braxton, Triny B, NP, 1,000 mg at 05/15/21 2147    hydrALAZINE (APRESOLINE) tablet 100 mg, 100 mg, Oral, TID, Braxton, Triny B, NP, 100 mg at 05/15/21 2223    isosorbide mononitrate ER (IMDUR) tablet 30 mg, 30 mg, Oral, DAILY, Braxton, Triny B, NP, 30 mg at 05/15/21 1244    tafamidis cap 61 mg (Patient Supplied), 61 mg, Oral, DAILY, Radha Portillo MD, 61 mg at 05/15/21 0803    ondansetron (ZOFRAN) injection 4 mg, 4 mg, IntraVENous, Q4H PRN, Valeriy Morrow MD    epoetin amalia-epbx (RETACRIT) injection 20,000 Units, 20,000 Units, SubCUTAneous, Q TUE, THU & SAT, Pavithra Cerda MD, 20,000 Units at 05/15/21 2149    piperacillin-tazobactam (ZOSYN) 3.375 g in 0.9% sodium chloride (MBP/ADV) 100 mL MBP, 3.375 g, IntraVENous, Q12H, Shivani MCGREGOR MD, Last Rate: 25 mL/hr at 05/16/21 0733, 3.375 g at 05/16/21 0733    Vancomycin Pharmacy Dosing, , Other, PRN, Shivani Cuenca MD    zinc oxide-cod liver oil (DESITIN) 40 % paste, , Topical, Q12H, Shivani MCGREGOR MD, Given at 05/15/21 2148    sodium chloride (NS) flush 5-10 mL, 5-10 mL, IntraVENous, PRN, Bob Chavez MD    sodium chloride (NS) flush 5-40 mL, 5-40 mL, IntraVENous, Q8H, Gerald Paniagua MD, 10 mL at 05/15/21 2148    sodium chloride (NS) flush 5-40 mL, 5-40 mL, IntraVENous, PRN, Gerald Paniagua MD    acetaminophen (TYLENOL) tablet 650 mg, 650 mg, Oral, Q6H PRN **OR** acetaminophen (TYLENOL) suppository 650 mg, 650 mg, Rectal, Q6H PRN, Gerald Paniagua MD    polyethylene glycol (MIRALAX) packet 17 g, 17 g, Oral, DAILY PRN, Bernardo Mckinley MD    albuterol-ipratropium (DUO-NEB) 2.5 MG-0.5 MG/3 ML, 3 mL, Nebulization, Q6H PRN, Bernardo Mckinley MD    Warfarin - Pharmacy to Dose, , Other, Rx Dosing/Monitoring, Favian Norman NP    sodium chloride (NS) flush 5-40 mL, 5-40 mL, IntraVENous, Q8H, Triny Limon NP, 10 mL at 05/15/21 2149    sodium chloride (NS) flush 5-40 mL, 5-40 mL, IntraVENous, PRN, Lane Limon NP    nystatin (MYCOSTATIN) 100,000 unit/gram powder, , Topical, PRN, Dennis Cortez MD    0.9% sodium chloride infusion, 11 mL/hr, IntraVENous, CONTINUOUS, Dennis MCGREGOR MD, Last Rate: 3 mL/hr at 05/15/21 0741, 3 mL/hr at 05/15/21 0741    PATIENT CARE TEAM:  Patient Care Team:  Camacho Pham NP as PCP - General (Nurse Practitioner)  Aura Mckinney MD (Cardiology)  Dante Yuen MD as Physician (Cardiology)  Bryan Morrison LPN as Care Coordinator  Kevin Cheema MD (Cardiothoracic Surgery)  Ester Zhou MD (Cardiology)     Thank you for allowing me to participate in this patient's care.     Raj Medina NP  [address] [place] [address], [address]  Phone: [phone]

## 2021-05-16 NOTE — PROGRESS NOTES
[facility]    ICU TEAM Progress Note    Name: Ericka Anderson   : 1950   MRN: [id number]   Date: 2021      I  Subjective:   Progress Note: 2021      Reason for ICU Admission: Acute encephalopathy. Sepsis    Interval history:  42-year-old lady with multiple medical problem on LVAD, end-stage renal disease on hemodialysis, diabetes mellitus type 2 and severe COPD admitted to the hospital on May 11 and acute encephalopathy. Thought to be related to sepsis and or hypercapnia  Overnight Events:   No acute event overnight, doing well.     Active Problem List:     Problem List  Date Reviewed: 2021          Codes Class    Severe sepsis with acute organ dysfunction ([facility] ICD-10-CM: A41.9, R65.20  ICD-9-CM: 038.9, 995.92 Acute        * (Principal) Acute encephalopathy ICD-10-CM: G93.40  ICD-9-CM: 348.30         Dyspnea ICD-10-CM: R06.00  ICD-9-CM: 786.09         HAIRSTON (dyspnea on exertion) ICD-10-CM: R06.00  ICD-9-CM: 786.09         Incontinence in female ICD-10-CM: R32  ICD-9-CM: 625.6         LVAD (left ventricular assist device) present [facility]) ICD-10-CM: Z95.811  ICD-9-CM: V43.21         Hospital discharge follow-up ICD-10-CM: Z09  ICD-9-CM: V67.59         ICD (implantable cardioverter-defibrillator) battery depletion ICD-10-CM: Z45.02  ICD-9-CM: V53.32         Coagulopathy ([facility] ICD-10-CM: D68.9  ICD-9-CM: 286.9         Open wound of left lower leg ICD-10-CM: L49.622B  ICD-9-CM: 891.0         Anemia ICD-10-CM: D64.9  ICD-9-CM: 285.9         ROCIO (acute kidney injury) ([facility] ICD-10-CM: N17.9  ICD-9-CM: 584.9         NSVT (nonsustained ventricular tachycardia) (HCC) ICD-10-CM: I47.2  ICD-9-CM: 427.1         NICM (nonischemic cardiomyopathy) (HCC) ICD-10-CM: I42.8  ICD-9-CM: 425.4         Coronary artery disease involving native coronary artery of native heart without angina pectoris ICD-10-CM: I25.10  ICD-9-CM: 414.01         JED on CPAP ICD-10-CM: G47.33, Z99.89  ICD-9-CM: 327.23, V46.8 Chronic venous insufficiency ICD-10-CM: I87.2  ICD-9-CM: 459.81         Ulcer of right foot, limited to breakdown of skin ([facility] ICD-10-CM: Y75.632  ICD-9-CM: 707.15         Acute on chronic systolic CHF (congestive heart failure) (HCC) ICD-10-CM: I50.23  ICD-9-CM: 428.23, 428.0         Obesity, morbid (HCC) ICD-10-CM: E66.01  ICD-9-CM: 278.01               Past Medical History:      has a past medical history of Asthma, Cancer ([facility] Cancer ([facility] Congestive heart failure, unspecified, CRI (chronic renal insufficiency), Depression, Diabetes ([facility] Hypertension, and Severe sepsis with acute organ dysfunction ([facility] (5/11/2021). Past Surgical History:      has a past surgical history that includes hx mastectomy; hx hysterectomy; hx hernia repair; ir insert non tunl cvc over 5 yrs (4/26/2021); and ir insert tunl cvc w/o port over 5 yr (5/5/2021). Home Medications:     Prior to Admission medications    Medication Sig Start Date End Date Taking? Authorizing Provider   bumetanide (BUMEX) 2 mg tablet Take 1 Tab by mouth three (3) times daily for 30 days. 5/6/21 6/5/21  Carlin Ribeiro MD   isosorbide mononitrate ER (IMDUR) 30 mg tablet Take 1 Tab by mouth daily for 30 days. 5/7/21 6/6/21  Pretty Perrin MD   therapeutic multivitamin [facility]) tablet Take 1 Tab by mouth daily for 30 days. 5/7/21 6/6/21  Carlin Ribeiro MD   cephALEXin (KEFLEX) 250 mg capsule Take 1 Cap by mouth two (2) times a day for 30 days. 5/6/21 6/5/21  Carlin Ribeiro MD   warfarin (COUMADIN) 5 mg tablet Take 1 Tab by mouth daily. Take per INR tracker 5/7/21   Pretty Perrin MD   warfarin (COUMADIN) 1 mg tablet Take 4 Tabs by mouth daily.  5/7/21   Calrin Ribeiro MD   FeroSul 325 mg (65 mg iron) tablet TAKE 1 TABLET BY MOUTH EVERY MORNING WITH BREAKFAST 4/6/21   Cherilyn Landau, MD   ranolazine ER (RANEXA) 500 mg SR tablet TAKE 2 TABLETS BY MOUTH TWICE DAILY 3/16/21   Cherilyn Landau, MD   hydrALAZINE (APRESOLINE) 25 mg tablet Take 4 Tabs by mouth three (3) times daily. 21   Johan Limon NP   insulin detemir (LEVEMIR U-100 INSULIN SC) 20 Units by SubCUTAneous route two (2) times a day. Provider, Historical   PARoxetine (PAXIL) 30 mg tablet Take 20 mg by mouth daily. Provider, Historical   albuterol (PROVENTIL HFA, VENTOLIN HFA, PROAIR HFA) 90 mcg/actuation inhaler Take 2 Puffs by inhalation every four (4) hours as needed for Wheezing. 20   Fifi Dimas MD   tafamidis (Vyndamax) 61 mg cap Take 61 mg by mouth daily. 20   Tevin Dai NP   magnesium oxide (MAG-OX) 400 mg tablet Take 1 Tab by mouth daily. 20   Tevin Dai NP   budesonide-formoteroL (Symbicort) 160-4.5 mcg/actuation HFAA Take 2 Puffs by inhalation two (2) times daily as needed. Provider, Historical       Allergies/Social/Family History: Allergies   Allergen Reactions    Benzodiazepines Other (comments)     Respiratory issues      Social History     Tobacco Use    Smoking status: Never Smoker    Smokeless tobacco: Never Used   Substance Use Topics    Alcohol use: Not Currently      No family history on file. Review of Systems:     10 system reviewed and are negative but as in interval history    Objective:   Vital Signs:  Visit Vitals  /80 Comment: treatment ended. Blood returned   Pulse 79   Temp 98.4 °F (36.9 °C)   Resp 21   Ht 5' 7\" (1.702 m)   Wt 105.2 kg (231 lb 14.8 oz)   SpO2 95%   BMI 36.32 kg/m²    O2 Flow Rate (L/min): 1 l/min O2 Device: Nasal cannula Temp (24hrs), Av.3 °F (36.8 °C), Min:98.2 °F (36.8 °C), Max:98.4 °F (36.9 °C)    CVP (mmHg): 10 mmHg (21 0700)      Intake/Output:     Intake/Output Summary (Last 24 hours) at 2021 1433  Last data filed at 2021 1131  Gross per 24 hour   Intake 1202 ml   Output 0 ml   Net 1202 ml       Physical Exam:    General:  Alert, cooperative, chronically ill looking   Eyes:  Sclera anicteric.  Pupils equally round and reactive to light. Mouth/Throat: Mucous membranes normal, oral pharynx clear   Neck: Supple   Lungs:   Clear to auscultation bilaterally, good effort   CV:  Regular rate and rhythm,no murmur, click, rub or gallop   Abdomen:   Soft, non-tender. bowel sounds normal. non-distended   Extremities:  Chronic lower limb discoloration   Skin: Skin color, texture, turgor normal. no acute rash or lesions   Lymph nodes: Cervical and supraclavicular normal   Musculoskeletal: No swelling or deformity   Lines/Devices:  Intact, no erythema, drainage or tenderness   Psych: Alert and oriented, normal mood affect given the setting         LABS AND  DATA: Personally reviewed  Recent Labs     05/16/21  0332 05/15/21  0314   WBC 4.1 4.5   HGB 8.8* 7.7*   HCT 29.9* 26.8*   * 126*     Recent Labs     05/16/21  0332 05/15/21  0314    135*   K 4.0 4.4    101   CO2 28 25   BUN 28* 49*   CREA 3.63* 4.79*   * 118*   CA 9.1 8.8   MG 2.5* 2.9*   PHOS 3.9 5.1*     Recent Labs     05/16/21  0332 05/15/21  0314   AP 52 47   TP 7.8 7.5   ALB 3.4* 3.2*   GLOB 4.4* 4.3*     Recent Labs     05/16/21  0332 05/15/21  0314 05/14/21  0922   INR 3.1* 1.9*  --    PTP 31.0* 19.1*  --    APTT  --  76.7* 61.1*      No results for input(s): PHI, PCO2I, PO2I, FIO2I in the last 72 hours. Recent Labs     05/16/21  0332 05/15/21  0314   CPK 15* 20*       Hemodynamics:   PAP:   CO:     Wedge:   CI:     CVP:  CVP (mmHg): 10 mmHg (05/16/21 0700) SVR:       PVR:       Ventilator Settings:  Mode Rate Tidal Volume Pressure FiO2 PEEP   Spontaneous   450 ml  5 cm H2O 40 % 5 cm H20     Peak airway pressure: 25 cm H2O    Minute ventilation: 10.1 l/min        MEDS: Reviewed    Chest X-Ray:  CXR Results  (Last 48 hours)    None          Assessment and Plan:   Chronic systolic heart failure on LVAD:  No acute issue. Appreciate heart failure team management  Sepsis:  Source is not clear, only positive culture is for Candida in urine.   Infectious disease following and I appreciate their input. Currently on Zosyn vancomycin and Eraxis  End-stage renal disease on hemodialysis:  Appreciate nephrology  Chronic hypoxic hypercapnic respiratory failure:  Patient doing well on her home trilogy ventilator at night. Supplemental oxygen to keep saturation above 92%    Heart failure team approve transfer to telemetry floor. Will notify the hospitalist team to continue following her with the heart failure team out of the intensive care unit. DISPOSITION  Stay in ICU    CRITICAL CARE CONSULTANT NOTE  I had a face to face encounter with the patient, reviewed and interpreted patient data including clinical events, labs, images, vital signs, I/O's, and examined patient. I have discussed the case and the plan and management of the patient's care with the consulting services, the bedside nurses and the respiratory therapist.      NOTE OF PERSONAL INVOLVEMENT IN CARE   This patient has a high probability of imminent, clinically significant deterioration, which requires the highest level of preparedness to intervene urgently. I participated in the decision-making and personally managed or directed the management of the following life and organ supporting interventions that required my frequent assessment to treat or prevent imminent deterioration. I personally spent 40 minutes of critical care time. This is time spent at this critically ill patient's bedside actively involved in patient care as well as the coordination of care and discussions with the patient's family. This does not include any procedural time which has been billed separately. Lizzeth Snow M.D.   Staff Intensivist/Pulmonologist  [facility]  5/16/2021

## 2021-05-16 NOTE — PROGRESS NOTES
0730: Bedside shift change report given to Libby Viveros (oncoming nurse) by Emmy Peoples (offgoing nurse). Report included the following information SBAR, Kardex, Intake/Output, MAR, Recent Results, and Cardiac Rhythm Paced . 1930: Bedside shift change report given to [address] (oncoming nurse) by Matthias CAUSEY (offgoing nurse). Report included the following information SBAR, Kardex, Intake/Output, MAR, Recent Results and Cardiac Rhythm Paced. Problem: Diabetes Self-Management  Goal: *Disease process and treatment process  Description: Define diabetes and identify own type of diabetes; list 3 options for treating diabetes. Outcome: Progressing Towards Goal  Goal: *Incorporating nutritional management into lifestyle  Description: Describe effect of type, amount and timing of food on blood glucose; list 3 methods for planning meals. Outcome: Progressing Towards Goal  Goal: *Incorporating physical activity into lifestyle  Description: State effect of exercise on blood glucose levels. Outcome: Progressing Towards Goal  Goal: *Developing strategies to promote health/change behavior  Description: Define the ABC's of diabetes; identify appropriate screenings, schedule and personal plan for screenings. Outcome: Progressing Towards Goal  Goal: *Using medications safely  Description: State effect of diabetes medications on diabetes; name diabetes medication taking, action and side effects. Outcome: Progressing Towards Goal  Goal: *Monitoring blood glucose, interpreting and using results  Description: Identify recommended blood glucose targets  and personal targets. Outcome: Progressing Towards Goal  Goal: *Prevention, detection, treatment of acute complications  Description: List symptoms of hyper- and hypoglycemia; describe how to treat low blood sugar and actions for lowering  high blood glucose level.   Outcome: Progressing Towards Goal  Goal: *Prevention, detection and treatment of chronic complications  Description: Define the natural course of diabetes and describe the relationship of blood glucose levels to long term complications of diabetes. Outcome: Progressing Towards Goal  Goal: *Developing strategies to address psychosocial issues  Description: Describe feelings about living with diabetes; identify support needed and support network  Outcome: Progressing Towards Goal  Goal: *Insulin pump training  Outcome: Progressing Towards Goal  Goal: *Sick day guidelines  Outcome: Progressing Towards Goal  Goal: *Patient Specific Goal (EDIT GOAL, INSERT TEXT)  Outcome: Progressing Towards Goal     Problem: Patient Education: Go to Patient Education Activity  Goal: Patient/Family Education  Outcome: Progressing Towards Goal     Problem: Pressure Injury - Risk of  Goal: *Prevention of pressure injury  Description: Document Jaime Scale and appropriate interventions in the flowsheet. Outcome: Progressing Towards Goal  Note: Pressure Injury Interventions:  Sensory Interventions: Assess changes in LOC    Moisture Interventions: Absorbent underpads    Activity Interventions: Increase time out of bed, Pressure redistribution bed/mattress(bed type), PT/OT evaluation    Mobility Interventions: Pressure redistribution bed/mattress (bed type)    Nutrition Interventions: Document food/fluid/supplement intake    Friction and Shear Interventions: Minimize layers, Apply protective barrier, creams and emollients                Problem: Patient Education: Go to Patient Education Activity  Goal: Patient/Family Education  Outcome: Progressing Towards Goal     Problem: Falls - Risk of  Goal: *Absence of Falls  Description: Document Godfrey Fall Risk and appropriate interventions in the flowsheet.   Outcome: Progressing Towards Goal  Note: Fall Risk Interventions:  Mobility Interventions: Communicate number of staff needed for ambulation/transfer    Mentation Interventions: Adequate sleep, hydration, pain control    Medication Interventions: Patient to call before getting OOB, Teach patient to arise slowly    Elimination Interventions: Call light in reach, Toileting schedule/hourly rounds    History of Falls Interventions: Door open when patient unattended         Problem: Patient Education: Go to Patient Education Activity  Goal: Patient/Family Education  Outcome: Progressing Towards Goal     Problem: Breathing Pattern - Ineffective  Goal: *Absence of hypoxia  Outcome: Progressing Towards Goal  Goal: *Use of effective breathing techniques  Outcome: Progressing Towards Goal  Goal: *PALLIATIVE CARE:  Alleviation of Dyspnea  Outcome: Progressing Towards Goal     Problem: Patient Education: Go to Patient Education Activity  Goal: Patient/Family Education  Outcome: Progressing Towards Goal     Problem: Airway Clearance - Ineffective  Goal: *PALLIATIVE CARE:  Alleviation of secretions, cough and/or nasal congestion  Outcome: Progressing Towards Goal     Problem: Nutrition Deficit  Goal: *Optimize nutritional status  Outcome: Progressing Towards Goal

## 2021-05-16 NOTE — PROGRESS NOTES
[facility]    ICU Intensivist- Critical Care Progress Note    Name: Royal Valadez   : 1950   MRN: [id number]   Admit: 2021  1:26 AM      Diagnosis:     Principal Problem:    Acute encephalopathy    Problem List:    Acute encephalopathy    NICM (nonischemic cardiomyopathy)    LVAD (left ventricular assist device) present    Severe sepsis with acute organ dysfunction    Acute on chronic systolic CHF (congestive heart failure)     Coronary artery disease involving native coronary artery of native heart without angina pectoris    Ulcer of right foot, limited to breakdown of skin    Chronic venous insufficiency    HAIRSTON (dyspnea on exertion)    Incontinence in female    Obesity, morbid    JED on CPAP    Dyspnea    ICU Comprehensive Plan of Care:     Plans for this Shift:   1. Severe sepsis with acute organ dysfunction and encephalopathy- resolving. 2. Nonischemic cardiomyopathy- stable    3. ESRD on hemodialysis- RIJ permacath access      Subjective:     Progress Note:   5/15/2021   10:06 AM     Reason for ICU Admission:   Severe sepsis with acute organ dysfunction    Overnight Events:   Subjectively improved    Past Medical History:      has a past medical history of Asthma, Cancer ([facility] [address] Cancer ([facility] [address] Congestive heart failure, unspecified, CRI (chronic renal insufficiency), Depression, Diabetes ([address] Hypertension, and Severe sepsis with acute organ dysfunction ([address] (2021). Past Surgical History:      has a past surgical history that includes hx mastectomy; hx hysterectomy; hx hernia repair; ir insert non tunl cvc over 5 yrs (2021); and ir insert tunl cvc w/o port over 5 yr (2021).     Current Medications:     Current Facility-Administered Medications   Medication Dose Route Frequency    famotidine (PEPCID) tablet 20 mg  20 mg Oral DAILY    benzocaine-zinc cl-benzalkonium cl (ORAJEL) 20-0.1-0.02 % mucosal gel   Oral PRN    magic mouthwash cpd (without sucralfate)  5 mL Oral DAILY PRN    anidulafungin (ERAXIS) 100 mg in 0.9% sodium chloride 130 mL IVPB  100 mg IntraVENous Q24H    balsam peru-castor oiL (VENELEX) ointment   Topical BID    albumin human 25% (BUMINATE) solution 50 g  50 g IntraVENous DIALYSIS PRN    heparin (porcine) 1,000 unit/mL injection 1,900 Units  1,900 Units InterCATHeter DIALYSIS PRN    And    heparin (porcine) 1,000 unit/mL injection 1,900 Units  1,900 Units InterCATHeter DIALYSIS PRN    hydrALAZINE (APRESOLINE) 20 mg/mL injection 20 mg  20 mg IntraVENous Q6H PRN    hydrALAZINE (APRESOLINE) 20 mg/mL injection 10 mg  10 mg IntraVENous Q6H PRN    ranolazine ER (RANEXA) tablet 1,000 mg  1,000 mg Oral BID    hydrALAZINE (APRESOLINE) tablet 100 mg  100 mg Oral TID    isosorbide mononitrate ER (IMDUR) tablet 30 mg  30 mg Oral DAILY    tafamidis cap 61 mg (Patient Supplied)  61 mg Oral DAILY    ondansetron (ZOFRAN) injection 4 mg  4 mg IntraVENous Q4H PRN    epoetin amalia-epbx (RETACRIT) injection 20,000 Units  20,000 Units SubCUTAneous Q TUE, THU & SAT    piperacillin-tazobactam (ZOSYN) 3.375 g in 0.9% sodium chloride (MBP/ADV) 100 mL MBP  3.375 g IntraVENous Q12H    Vancomycin Pharmacy Dosing   Other PRN    zinc oxide-cod liver oil (DESITIN) 40 % paste   Topical Q12H    sodium chloride (NS) flush 5-10 mL  5-10 mL IntraVENous PRN    sodium chloride (NS) flush 5-40 mL  5-40 mL IntraVENous Q8H    sodium chloride (NS) flush 5-40 mL  5-40 mL IntraVENous PRN    acetaminophen (TYLENOL) tablet 650 mg  650 mg Oral Q6H PRN    Or    acetaminophen (TYLENOL) suppository 650 mg  650 mg Rectal Q6H PRN    polyethylene glycol (MIRALAX) packet 17 g  17 g Oral DAILY PRN    albuterol-ipratropium (DUO-NEB) 2.5 MG-0.5 MG/3 ML  3 mL Nebulization Q6H PRN    propofol (DIPRIVAN) 10 mg/mL infusion  0-50 mcg/kg/min IntraVENous TITRATE    fentaNYL (PF) 1,500 mcg/30 mL (50 mcg/mL) infusion  0-200 mcg/hr IntraVENous TITRATE    NOREPINephrine (LEVOPHED) 8 mg in 5% dextrose 250mL (32 mcg/mL) infusion  0.5-16 mcg/min IntraVENous TITRATE    Warfarin - Pharmacy to Dose   Other Rx Dosing/Monitoring    sodium chloride (NS) flush 5-40 mL  5-40 mL IntraVENous Q8H    sodium chloride (NS) flush 5-40 mL  5-40 mL IntraVENous PRN    nystatin (MYCOSTATIN) 100,000 unit/gram powder   Topical PRN    0.9% sodium chloride infusion  11 mL/hr IntraVENous CONTINUOUS       Allergies/Social/Family History: Allergies   Allergen Reactions    Benzodiazepines Other (comments)     Respiratory issues      Social History     Tobacco Use    Smoking status: Never Smoker    Smokeless tobacco: Never Used   Substance Use Topics    Alcohol use: Not Currently      No family history on file. Review of Systems:     A comprehensive review of systems was negative. Objective:   Vital Signs:  Visit Vitals  /80 Comment: treatment ended. Blood returned   Pulse 86   Temp 98.3 °F (36.8 °C)   Resp 24   Ht 5' 7\" (1.702 m)   Wt 104.7 kg (230 lb 13.2 oz)   SpO2 95%   BMI 36.15 kg/m²    O2 Flow Rate (L/min): 1 l/min O2 Device: Nasal cannula Temp (24hrs), Av.1 °F (36.7 °C), Min:97.2 °F (36.2 °C), Max:98.5 °F (36.9 °C)    CVP (mmHg): 8 mmHg (05/15/21 2000)      Intake/Output:     Intake/Output Summary (Last 24 hours) at 5/15/2021 2206  Last data filed at 5/15/2021 1900  Gross per 24 hour   Intake 994.03 ml   Output 1001 ml   Net -6.97 ml       Physical Exam:  General:  Alert, cooperative, well noursished, well developed, appears stated age   Eyes:  Sclera anicteric. Pupils equally round and reactive to light. Mouth/Throat: Mucous membranes normal, oral pharynx clear   Neck: Supple   Lungs:   Clear to auscultation bilaterally, good effort   CV:  Regular rate and rhythm,no murmur, click, rub or gallop   Abdomen:   Soft, non-tender.  bowel sounds normal. non-distended   Extremities: No cyanosis or edema   Skin: Skin color, texture, turgor normal. no acute rash or lesions   Lymph nodes: Cervical and supraclavicular normal   Musculoskeletal: No swelling or deformity   Lines/Devices:  Intact, no erythema, drainage or tenderness   Psych: Alert and oriented, normal mood affect given the setting       LABS AND  DATA: Personally reviewed  Recent Labs     05/15/21  0314 05/14/21  0408   WBC 4.5 7.8   HGB 7.7* 8.6*   HCT 26.8* 28.9*   * 149*     Recent Labs     05/15/21  0314 05/14/21  0408   * 138   K 4.4 4.1    102   CO2 25 25   BUN 49* 31*   CREA 4.79* 3.67*   * 96   CA 8.8 9.3   MG 2.9* 2.8*   PHOS 5.1* 4.5     Recent Labs     05/15/21  0314 05/14/21  0408   AP 47 61   TP 7.5 8.0   ALB 3.2* 3.6   GLOB 4.3* 4.4*     Recent Labs     05/15/21  0314 05/14/21  0922 05/14/21  0408   INR 1.9*  --  1.4*   PTP 19.1*  --  14.9*   APTT 76.7* 61.1* PLEASE DISREGARD RESULTS      Recent Labs     05/13/21  1238   PHI 7.35   PCO2I 49.5*   PO2I 76*     Recent Labs     05/15/21  0314 05/14/21  0408   CPK 20* 27         Ventilator Settings:  Mode Rate Tidal Volume Pressure FiO2 PEEP   Spontaneous   450 ml  5 cm H2O 40 % 5 cm H20     Peak airway pressure: 25 cm H2O    Minute ventilation: 10.1 l/min        MEDS: Reviewed    RADIOLOGY:  No results found. Assessment:     Hospital Problems  Date Reviewed: 4/25/2021          Codes Class Noted POA    Severe sepsis with acute organ dysfunction [facility]) ICD-10-CM: A41.9, R65.20  ICD-9-CM: 038.9, 995.92 Acute 5/11/2021 Yes        * (Principal) Acute encephalopathy ICD-10-CM: G93.40  ICD-9-CM: 348.30  5/11/2021 Unknown                  Multidisciplinary Rounds Completed: Yes    ABCDEF Bundle/Checklist  Pain Medications: None  Target RASS: 0 - Alert & Calm - Spontaneously pays attention to caregiver  Sedation Medications: None  CAM-ICU:  Negative  Discussed Plan of Care (goals of care):  Yes  Addressed Code Status: Full Code    CARDIOVASCULAR  Cardiac Gtts: None  SBP Goal of: > 90 mmHg  MAP Goal of: > 65 mmHg  Transfusion Trigger (Hgb): <7 g/dL    ANTIBIOTICS  Antibiotics:  Vancomycin  Zosyn    T/L/D  Tubes: None  Lines: Peripheral IV and Central Line  Drains: None    SPECIAL EQUIPMENT  IHD and LVAD    DISPOSITION  Stay in ICU    CRITICAL CARE CONSULTANT NOTE  I provided a face-to-face bedside physician/patient encounter, greater than the usual and customary amount normally needed, due to the moderate complexity of medical decision-making required. I reviewed and interpreted patient data including clinical events, labs, images, vital signs, I/O's, and examined patient. I have actively participated in multi-disciplinary discussions (Thoracic Surgery, Nephrology, CVICU Nursing Staff) regarding the case in formulating an optimal therapeutic plan, and effecting a management strategy for this patient. NOTE OF PERSONAL INVOLVEMENT IN CARE   This patient has a moderate probability of imminent, clinically significant deterioration, which requires the highest level of preparedness to intervene urgently. I participated in the decision-making and personally managed, or directed the management of, a myriad of life and organ supporting interventions which required my frequent-interval clinical reassessments, in order to treat or prevent imminent deterioration. I personally spent 30 minutes of critical care time. This is time spent at this critically ill patient's bedside actively involved in patient care as well as the coordination of care and discussions with the patient's family. This does not include any procedural time which has been billed separately.     Gian Retana MD, [facility] Critical Care  5/15/2021

## 2021-05-16 NOTE — PROGRESS NOTES
Problem: Mobility Impaired (Adult and Pediatric)  Goal: *Acute Goals and Plan of Care (Insert Text)  Description: FUNCTIONAL STATUS PRIOR TO ADMISSION: Patient was modified independent using a rollator for functional mobility. HOME SUPPORT PRIOR TO ADMISSION: The patient lived alone with her daughter to provide assistance as needed. Recent prolonged admission to [facility], discharged to rehab then readmitted from rehab. Physical Therapy Goals  Initiated 5/16/2021  1. Patient will move from supine to sit and sit to supine  and scoot up and down in bed with minimal assistance/contact guard assist within 7 day(s). 2.  Patient will transfer from bed to chair and chair to bed with supervision/set-up using the least restrictive device within 7 day(s). 3.  Patient will perform sit to stand with supervision/set-up within 7 day(s). 4.  Patient will ambulate with supervision/set-up for 50 feet with the least restrictive device within 7 day(s). 5.  Patient will ascend/descend 1 stairs without handrail(s) with supervision/set-up within 7 day(s). Outcome: Progressing Towards Goal     PHYSICAL THERAPY EVALUATION  Patient: Keshav Estrada (41 y.o. female)  Date: 5/16/2021  Primary Diagnosis: Acute encephalopathy [G93.40]        Precautions:  (LVAD)    ASSESSMENT  Based on the objective data described below, the patient presents with impaired functional mobility as compared to baseline level following admission from [facility] for worsening SOB and AMS, requiring intubation from 5/11 - 5/13. At baseline prior to initial hospitalization, pt lived at home alone and was mod I with ADLs/mobility using a rollator and managing her LVAD without assist.    Received pt up in chair in CVICU, alert and ?intermittently confused. Deferred completing switch over to battery power today (daugther to bring in her vest and clips). She was able to complete multiple sit<>stands with up to min A plus HHA for stabilization once up.  Performed several bouts of marching in place as well as several fwd and retro steps with B HHA (min A for stability). Quick fatigue but no major knee buckle. Remained up in recliner at end of session, NAD. Recommending discharge back to IPR vs home with HHPT and assist pending progress/family support once medically cleared. Will follow. Current Level of Function Impacting Discharge (mobility/balance): min A for sit<>stands    Functional Outcome Measure: The patient scored 35/100 on the barthel outcome measure which is indicative of falls risk. Other factors to consider for discharge: below baseline      Patient will benefit from skilled therapy intervention to address the above noted impairments. PLAN :  Recommendations and Planned Interventions: bed mobility training, transfer training, gait training, therapeutic exercises, neuromuscular re-education, patient and family training/education, and therapeutic activities      Frequency/Duration: Patient will be followed by physical therapy:  5 times a week to address goals. Recommendation for discharge: (in order for the patient to meet his/her long term goals)  To be determined: pending progress - back to IPR vs home with assist and HHPT    This discharge recommendation:  A follow-up discussion with the attending provider and/or case management is planned    IF patient discharges home will need the following DME: to be determined (TBD)         SUBJECTIVE:   Patient stated Oh they are carrying in trays one by one.     OBJECTIVE DATA SUMMARY:   HISTORY:    Past Medical History:   Diagnosis Date    Asthma     Cancer ([facility] [address]     breast    Cancer ([facility] [address]     endometrial    Congestive heart failure, unspecified     CRI (chronic renal insufficiency)     Depression     Diabetes ([facility] [address]     Hypertension     Severe sepsis with acute organ dysfunction ([facility] [address] 5/11/2021     Past Surgical History:   Procedure Laterality Date    HX HERNIA REPAIR      HX HYSTERECTOMY      HX MASTECTOMY      IR INSERT NON TUNL CVC OVER 5 YRS  2021    IR INSERT TUNL CVC W/O PORT OVER 5 YR  2021       Personal factors and/or comorbidities impacting plan of care: PMHx    Home Situation  Home Environment: Private residence  # Steps to Enter: 1  One/Two Story Residence: One story  Living Alone: Yes  Support Systems: Family member(s)  Patient Expects to be Discharged to[de-identified] Private residence(D/C to home with daughter )  Current DME Used/Available at Home: Hospital bed, Commode, bedside, Walker, rollator, Wheelchair    EXAMINATION/PRESENTATION/DECISION MAKING:   Critical Behavior:  Neurologic State: Alert  Orientation Level: Oriented X4  Cognition: Follows commands  Safety/Judgement: Awareness of environment  Hearing: Auditory  Auditory Impairment: None  Skin:    Edema:   Range Of Motion:  AROM: Generally decreased, functional  PROM: Generally decreased, functional    Strength:    Strength: Generally decreased, functional     Tone & Sensation:   Tone: Normal        Coordination:  Coordination: Generally decreased, functional  Vision:      Functional Mobility:  Bed Mobility:     Transfers:  Sit to Stand: Minimum assistance  Stand to Sit: Minimum assistance     Balance:   Sitting: Impaired; Without support  Sitting - Static: Good (unsupported)  Sitting - Dynamic: Good (unsupported); Fair (occasional)  Standing: Impaired  Standing - Static: Fair  Standing - Dynamic : Fair;Constant support    Functional Measure:  Barthel Index:    Bathin  Bladder: 5  Bowels: 10  Groomin  Dressin  Feedin  Mobility: 0  Stairs: 0  Toilet Use: 5  Transfer (Bed to Chair and Back): 10  Total: 35/100       The Barthel ADL Index: Guidelines  1. The index should be used as a record of what a patient does, not as a record of what a patient could do. 2. The main aim is to establish degree of independence from any help, physical or verbal, however minor and for whatever reason.   3. The need for supervision renders the patient not independent. 4. A patient's performance should be established using the best available evidence. Asking the patient, friends/relatives and nurses are the usual sources, but direct observation and common sense are also important. However direct testing is not needed. 5. Usually the patient's performance over the preceding 24-48 hours is important, but occasionally longer periods will be relevant. 6. Middle categories imply that the patient supplies over 50 per cent of the effort. 7. Use of aids to be independent is allowed. Carlos Wise., Barthel, DLacieW. (3232). Functional evaluation: the Barthel Index. [facility] (14)2. Leonette Castleman isaac FORREST Eagle, Rolo Deng., Nya Swan., [place], [address] [place] [address] Ave (1999). Measuring the change indisability after inpatient rehabilitation; comparison of the responsiveness of the Barthel Index and Functional [place] Measure. Journal of Neurology, Neurosurgery, and Psychiatry, 66(4), 677-926. [place] Rodarte.A, ROSLYN Still, & Lamar Cormier M.A. (2004.) Assessment of post-stroke quality of life in cost-effectiveness studies: The usefulness of the Barthel Index and the EuroQoL-5D.  Quality of Life Research, 15, 305-37       Physical Therapy Evaluation Charge Determination   History Examination Presentation Decision-Making   HIGH Complexity :3+ comorbidities / personal factors will impact the outcome/ POC  MEDIUM Complexity : 3 Standardized tests and measures addressing body structure, function, activity limitation and / or participation in recreation  LOW Complexity : Stable, uncomplicated  HIGH Complexity : FOTO score of 1- 25       Based on the above components, the patient evaluation is determined to be of the following complexity level: LOW     Activity Tolerance:   Good, requires frequent rest breaks, and observed SOB with activity    After treatment patient left in no apparent distress:   Sitting in chair, Call bell within reach, and Caregiver / family present    COMMUNICATION/EDUCATION:   The patients plan of care was discussed with: Registered nurse. Fall prevention education was provided and the patient/caregiver indicated understanding., Patient/family have participated as able in goal setting and plan of care. , and Patient/family agree to work toward stated goals and plan of care.     Thank you for this referral.  Gerry Ryder, PT, DPT   Time Calculation: 19 mins

## 2021-05-16 NOTE — PROGRESS NOTES
[facility] Adult  Hospitalist Group     ICU Transfer/Accept Summary     This patient is being [place] [address] ICU  DATE OF TRANSFER: 5/16/2021       PATIENT ID: Ki Hsu  MRN: [id number]   YOB: 1950    PRIMARY CARE PROVIDER: Scott Llanes NP   DATE OF ADMISSION: 5/11/2021  1:26 AM    ATTENDING PHYSICIAN: Mic Crooks MD  CONSULTATIONS:   IP CONSULT TO ADVANCED HEART FAILURE  IP CONSULT TO ADVANCED HEART FAILURE  IP CONSULT TO PALLIATIVE CARE - PROVIDER  IP CONSULT TO INFECTIOUS DISEASES  IP CONSULT TO ELECTROPHYSIOLOGY    PROCEDURES/SURGERIES:   * No surgery found *    REASON FOR ADMISSION: Acute encephalopathy     HOSPITAL PROBLEM LIST:  Patient Active Problem List   Diagnosis Code    Obesity, morbid ([facility] E66.01    Acute on chronic systolic CHF (congestive heart failure) ([facility]) I50.23    NICM (nonischemic cardiomyopathy) ([facility]) I42.8    Coronary artery disease involving native coronary artery of native heart without angina pectoris I25.10    JED on CPAP G47.33, Z99.89    Chronic venous insufficiency I87.2    Ulcer of right foot, limited to breakdown of skin ([facility]) L97.521    NSVT (nonsustained ventricular tachycardia) ([facility]) I47.2    Anemia D64.9    ROCIO (acute kidney injury) ([facility] N17.9    Coagulopathy ([facility] D68.9    Open wound of left lower leg S81.802A    Incontinence in female R32    LVAD (left ventricular assist device) present [facility]) [address] discharge follow-up Z09    ICD (implantable cardioverter-defibrillator) battery depletion Z45.02    HAIRSTON (dyspnea on exertion) R06.00    Dyspnea R06.00    Acute encephalopathy G93.40    Severe sepsis with acute organ dysfunction (HCC) A41.9, R65.20         Brief HPI and Hospital Course:         HPI: 69F with hx of HF on LVAD(HMII) placed 2017, AICD, COPD on Trilogy at home, ESRD now HD dependent, she was recently discharged from [facility] on 5/6/2021 after a prolong hospitalization for ROCIO/CHF,  patient was transferred from long term care facility for acute encephalopathy and hypoxic respiratory failure on 5/11. Pt was intubated and admitted to ICU    Extubated 5/13  Stable overnight, transfer out ICU 5/16     Assessment and Plan:    Sepsis:   Source is not clear, only positive culture is for Candida in urine. Infectious disease following     Currently on Zosyn vancomycin and Eraxis  Acute on chronic respiratory failure :due to sepsis, chf and severe copd : improved   Patient doing well on her home trilogy ventilator at night. Supplemental oxygen to keep saturation above 92%  -dialysis per nephrologist      Chronic RV failure  Coronary artery disease  · [facility] (8/2016) high grade ramus and small PDA disease, borderline disease of LAD and takeoff of pRCA  Chronic systolic heart failure: BV , s/p LVAD, s/p ICD. 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 Cx negative   - Out pt dialysis to be set up at [place], [place]   -plan per nephrologist        Hyperkalemia, resolved  Pulmonary hypertension  Morbid obesity, Body mass index is 42.29 kg/m².   DM2 insulin dependent:  Follow BG, start SSI   Will start lantus tomorrow if no hypoglycemia noticed   Will adjust.     JED on Trilogy    Urinary incontinence, severe    Endometrial cancer (2002)  HTN  HL          PHYSICAL EXAMINATION:  Visit Vitals  /80 Comment: treatment ended. Blood returned   Pulse 80   Temp 98 °F (36.7 °C)   Resp 25   Ht 5' 7\" (1.702 m)   Wt 105.2 kg (231 lb 14.8 oz)   SpO2 96%   BMI 36.32 kg/m²       General:          Alert, cooperative, no distress  HEENT:           Atraumatic, MMM            Neck:               Supple, symmetrical,  thyroid: non tender  Lungs:             Clear to auscultation bilaterally. No Wheezing or Rhonchi. No rales. Heart:              LVAD hums   Abdomen:       Soft, non-tender. Not distended. Bowel sounds normal  Extremities:     No cyanosis. chronis stasis skin changes of legs. Skin:                Not pale. Not Jaundiced  No rashes   Psych:             Not anxious or agitated. Neurologic:      Alert, moves all extremities, oriented X 3. Labs:     Recent Labs     05/16/21  0332 05/15/21  0314   WBC 4.1 4.5   HGB 8.8* 7.7*   HCT 29.9* 26.8*   * 126*     Recent Labs     05/16/21  0332 05/15/21  0314 05/14/21  0408    135* 138   K 4.0 4.4 4.1    101 102   CO2 28 25 25   BUN 28* 49* 31*   CREA 3.63* 4.79* 3.67*   * 118* 96   CA 9.1 8.8 9.3   MG 2.5* 2.9* 2.8*   PHOS 3.9 5.1* 4.5     Recent Labs     05/16/21  0332 05/15/21  0314 05/14/21  0408   ALT 10* 9* 8*   AP 52 47 61   TBILI 0.7 0.7 0.9   TP 7.8 7.5 8.0   ALB 3.4* 3.2* 3.6   GLOB 4.4* 4.3* 4.4*     Recent Labs     05/16/21  0332 05/15/21  0314 05/14/21  0922 05/14/21  0408   INR 3.1* 1.9*  --  1.4*   PTP 31.0* 19.1*  --  14.9*   APTT  --  76.7* 61.1* PLEASE DISREGARD RESULTS      No results for input(s): FE, TIBC, PSAT, FERR in the last 72 hours. Lab Results   Component Value Date/Time    Folate 10.0 10/28/2020 03:56 AM      No results for input(s): PH, PCO2, PO2 in the last 72 hours.   Recent Labs     05/16/21  0332 05/15/21  0314 05/14/21  0408   CPK 15* 20* 27     Lab Results   Component Value Date/Time    Cholesterol, total 129 04/16/2021 10:40 AM    HDL Cholesterol 33 04/16/2021 10:40 AM    LDL, calculated 74 04/16/2021 10:40 AM    Triglyceride 110 04/16/2021 10:40 AM    CHOL/HDL Ratio 3.9 04/16/2021 10:40 AM     Lab Results   Component Value Date/Time    Glucose (POC) 273 (H) 05/11/2021 03:10 AM    Glucose (POC) 91 05/06/2021 11:15 AM    Glucose (POC) 128 (H) 05/06/2021 06:36 AM    Glucose (POC) 114 (H) 05/05/2021 09:57 PM    Glucose (POC) 138 (H) 05/05/2021 04:21 PM     Lab Results   Component Value Date/Time    Color DARK YELLOW 05/11/2021 03:57 AM    Appearance TURBID (A) 05/11/2021 03:57 AM    Specific gravity 1.029 05/11/2021 03:57 AM    Specific gravity 1.013 11/09/2020 12:05 AM    pH (UA) 5.0 05/11/2021 03:57 AM    Protein 100 (A) 05/11/2021 03:57 AM    Glucose Negative 05/11/2021 03:57 AM    Ketone TRACE (A) 05/11/2021 03:57 AM    Bilirubin Negative 04/18/2021 05:08 PM    Urobilinogen 0.2 05/11/2021 03:57 AM    Nitrites Positive (A) 05/11/2021 03:57 AM    Leukocyte Esterase LARGE (A) 05/11/2021 03:57 AM    Epithelial cells MODERATE (A) 05/11/2021 03:57 AM    Bacteria 1+ (A) 05/11/2021 03:57 AM    WBC >100 (H) 05/11/2021 03:57 AM    RBC 20-50 05/11/2021 03:57 AM         CODE STATUS:  x Full Code    DNR    Partial    Comfort Care       Signed:   Mathew Angulo MD  Date of Service:  5/16/2021  3:53 PM

## 2021-05-17 NOTE — PROGRESS NOTES
[facility] in [place], [place]  Inpatient Consult Progress Note      Patient name: Олег Gonzalez  Patient : 1950  Patient MRN: [id number]  Consulting MD: Ester Denver, MD  Date of service: 21    CHIEF COMPLAINT:  Chief Complaint   Patient presents with    Altered mental status        PLAN:  Continue set speed 9400rpm, low speed 9000rpm   Cont home dose hydralazine/Imdur  Cont Tafamidis 61mg daily  Intolerant to BB/ACEi/ARB/ARNI due to aTTR  Intolerant to MRA due to hyperkalemia  Cont Ranexa   Volume management per Nephrology; strict I/O, standing weights  HD tomorrow   Pulmonary hygiene  Cont Zosyn  Cont Eraxis, appreciate ID recommendations   ID consult for yeast in urine and sputum   Blood cultures negative   Drive line culture- rare diphtheroids   Coumadin dose per pharmacy; goal INR lowered to 1.8-2.5 due to GIB  Antibiotics for chronic sternal wound infection- continue Keflex po when appropriate  Will ask EP to see for increased ectopy.    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 Palmer Mom)  · C/b delayed extubation due to severe COPD  · C/b critical illness polyneuropathy  · C/b prolonged hospitalization post-LVAD, 55 days  · C/b sternal wound infection s/p debridement (by  Elvie Rios) s/p wound vac  · C/b sacral ulcer  · Would culture positive for Staph aureus, not MRSA  · C/b LVAD site drainage, improved  S/p CRT-ICD  · ICD fired due to electrolyte imbalance (2011)  H/o breast cancer (1992)   · s/p bilateral mastectomy/chemo and endometrial cancer s/p hysterectomy  · Lymphedema of LLE due to cancer treatment  Severe COPD with FEV1 50%  Depression  Atrial fibrillation  H/o \"two mini strokes\"  S/p fall with hip facture   · Right hip hemmiarthroplasty (5/23/18) by Dr. Kathe Amaya)  · S/p removed hip hardwarare due to pain (4/15/19)  COPD severe  CKD, stage 4- on HD  Hyperkalemia, resolved  Pulmonary hypertension  Cardiac risk factors:  · Morbid obesity, Body mass index is 42.29 kg/m². · DM2 insulin dependent  · JED on Trilogy  · HL  Urinary incontinence, severe  · no procedures due to anticoagulation  Endometrial cancer (2002)  HTN  HL    Recent Events:  More fatigued this morning  Transferred to stepdown  PCT trending down  INR supratherapeutic      CARDIAC IMAGING:  TTE 5/11/21 LVEF < 15%, LVIDd 5.96cm, TAPSE 0.99cm, RVIDd 4.14cm  TTE 4/19 shows improved LVIDd, 6.68 cm with RVIDd 5.14 cm and TAPSE 1.1 cm   TTE 4/28/21 LVIDd 7.37cm, RVIDd 5.27cm, TAPSE 1.44cm   Echo (9/24/19) LVEF 20-25%, AV opens 1:1, no AR  Echo (5/29/18) LVEF 10-%, ramps study done, report in epic  Echo (1/9/18) ramp study done, LVEDD 7.1cm  LHC (11/9/18) 2 vessel disease with 90% OM, 80% PDA, DSA to PDA branch  RHC 4/20 showed adequate Sal CI 3.0 but severely elevated RA pressure 24 mmHg  CXR negative 4/27/21     INTERVAL HISTORY:  Ms. Roger Poon is a 78 y.o. Female s/p LVAD implant with HM 2 and ESRD requiring HD that was recently admitted for RV failure and worsening renal function. She was discharged on 5/5/21 to [facility] for [facility] before transitioning home to live her with daughter.  In the last few days she had worsening mental status changed, decreased UOP and respiratory distress after possible aspiration- she was intubated and admitted to CV for further management. LVAD INTERROGATION:  Device interrogated in person  Device function normal, normal flow, no events  LVAD   Pump Speed (RPM): 9400  Pump Flow (LPM): 4.7  MAP: 76  PI (Pulsitility Index): 6.5  Power: 5.6   Test: Yes  Back Up  at Bedside & Labeled: Yes  Power Module Test: Yes  Driveline Site Care: No  Driveline Dressing: Clean, Dry, and Intact  Outpatient: No  MAP in Therapeutic Range (Outpatient): Yes  Testing  Alarms Reviewed: Yes  Back up SC speed: 9400  Back up Low Speed Limit: 9000  Emergency Equipment with Patient?: Yes  Emergency procedures reviewed?: Yes  Drive line site inspected?: No  Drive line intergrity inspected?: Yes  Drive line dressing changed?: No    PHYSICAL EXAM:  Visit Vitals  /80 Comment: treatment ended. Blood returned   Pulse (!) 119   Temp 98.7 °F (37.1 °C)   Resp 22   Ht 5' 7\" (1.702 m)   Wt 237 lb 10.5 oz (107.8 kg)   SpO2 95%   BMI 37.22 kg/m²     General: Patient is well developed, well-nourished in no acute distress, sedated   HEENT: Normocephalic and atraumatic. No scleral icterus. Pupils are equal, round and reactive to light and accomodation. No conjunctival injection. Oropharynx is clear. Neck: Supple. No evidence of thyroid enlargements or lymphadenopathy. JVD: Cannot be appreciated   Lungs: Breath sounds are equal and clear bilaterally. No wheezes, rhonchi, or rales. On Vent  Heart: Regular rate and rhythm with normal S1 and S2. No murmurs, gallops or rubs. Abdomen: Soft, no mass or tenderness. No organomegaly or hernia. Bowel sounds present. Genitourinary and rectal: deferred  Extremities: No cyanosis, clubbing, or edema. Neurologic: No focal sensory or motor deficits are noted. Grossly intact. Psychiatric: Awake, alert an doriented x 3. Appropriate mood and affect. Skin: Warm, dry and well perfused. No lesions, nodules or rashes are noted.     REVIEW OF SYSTEMS:  Review of Systems   Constitutional: Positive for malaise/fatigue. Negative for chills and fever. Respiratory: Negative for cough and shortness of breath. Cardiovascular: Negative for chest pain and leg swelling. Gastrointestinal: Negative for heartburn and nausea. Genitourinary: Negative. Musculoskeletal: Negative. Neurological: Negative. Endo/Heme/Allergies: Negative. Psychiatric/Behavioral: The patient has insomnia. PAST MEDICAL HISTORY:  Past Medical History:   Diagnosis Date    Asthma     Cancer ([facility]     breast    Cancer ([facility]     endometrial    Congestive heart failure, unspecified     CRI (chronic renal insufficiency)     Depression     Diabetes [facility]     Hypertension     Severe sepsis with acute organ dysfunction ([facility] 5/11/2021       PAST SURGICAL HISTORY:  Past Surgical History:   Procedure Laterality Date    HX HERNIA REPAIR      HX HYSTERECTOMY      HX MASTECTOMY      IR INSERT NON TUNL CVC OVER 5 YRS  4/26/2021    IR INSERT TUNL CVC W/O PORT OVER 5 YR  5/5/2021       FAMILY HISTORY:  No family history on file. SOCIAL HISTORY:  Social History     Socioeconomic History    Marital status:      Spouse name: Not on file    Number of children: Not on file    Years of education: Not on file    Highest education level: Not on file   Tobacco Use    Smoking status: Never Smoker    Smokeless tobacco: Never Used   Substance and Sexual Activity    Alcohol use: Not Currently       LABORATORY RESULTS:     Labs Latest Ref Rng & Units 5/17/2021 5/16/2021 5/15/2021 5/14/2021 5/13/2021 5/13/2021 5/12/2021   WBC 3.6 - 11.0 K/uL 3.7 4.1 4.5 7.8 - 7.4 11. 4(H)   RBC 3.80 - 5.20 M/uL 3.08(L) 3.22(L) 2.88(L) 3.14(L) - 3.02(L) 3.29(L)   Hemoglobin 11.5 - 16.0 g/dL 8.6(L) 8.8(L) 7. 7(L) 8.6(L) - 8. 3(L) 8. 9(L)   Hematocrit 35.0 - 47.0 % 28. 3(L) 29. 9(L) 26. 8(L) 28. 9(L) - 27. 6(L) 29. 9(L)   MCV 80.0 - 99.0 FL 91.9 92.9 93.1 92.0 - 91.4 90.9   Platelets 421 - 568 K/uL 127(L) 134(L) 126(L) 149(L) - 130(L) 167   Lymphocytes 12 - 49 % 11(L) 9(L) 9(L) 4(L) - 5(L) 7(L)   Monocytes 5 - 13 % 13 11 11 7 - 6 5   Eosinophils 0 - 7 % 0 0 0 0 - 0 0   Basophils 0 - 1 % 1 0 0 0 - 0 0   Bands 0 - 6 % - - 8(H) 11(H) - - -   Albumin 3.5 - 5.0 g/dL 3. 1(L) 3.4(L) 3. 2(L) 3.6 - 3.4(L) 3.6   Calcium 8.5 - 10.1 MG/DL 8.7 9.1 8.8 9.3 - 9.1 9.1   Glucose 65 - 100 mg/dL 111(H) 119(H) 118(H) 96 - 110(H) 104(H)   BUN 6 - 20 MG/DL 41(H) 28(H) 49(H) 31(H) - 45(H) 27(H)   Creatinine 0.55 - 1.02 MG/DL 4.88(H) 3.63(H) 4.79(H) 3.67(H) - 4.78(H) 3.67(H)   Sodium 136 - 145 mmol/L 136 136 135(L) 138 - 137 138   Potassium 3.5 - 5.1 mmol/L 4.2 4.0 4.4 4.1 4.2 4.2 3.8   TSH 0.36 - 3.74 uIU/mL - - 1.60 - - - -   LDH 81 - 246 U/L 156 160 151 189 - 156 194   Some recent data might be hidden     Lab Results   Component Value Date/Time    TSH 1.60 05/15/2021 03:14 AM    TSH 5.36 (H) 04/24/2021 04:32 AM    TSH 3.99 (H) 04/17/2021 04:54 AM    TSH 2.980 10/01/2020 12:00 AM       ALLERGY:  Allergies   Allergen Reactions    Benzodiazepines Other (comments)     Respiratory issues        CURRENT MEDICATIONS:    Current Facility-Administered Medications:     warfarin - HOLD today 5/17, , Other, ONCE, Triny Limon, NP    glucose chewable tablet 16 g, 4 Tab, Oral, PRN, Elda Pineda MD    dextrose (D50W) injection syrg 12.5-25 g, 25-50 mL, IntraVENous, PRN, Elda Pineda MD    glucagon [facility]) injection 1 mg, 1 mg, IntraMUSCular, PRN, Elda Pineda MD    insulin lispro (HUMALOG) injection, , SubCUTAneous, AC&HS, Carline Gallego MD, Stopped at 05/16/21 1630    famotidine (PEPCID) tablet 20 mg, 20 mg, Oral, DAILY, Rodolfo Wilkins MD, 20 mg at 05/17/21 0959    benzocaine-zinc cl-benzalkonium cl (ORAJEL) 20-0.1-0.02 % mucosal gel, , Oral, PRN, Rodolfo Wilkins MD    magic mouthwash cpd (without sucralfate), 5 mL, Oral, DAILY PRN, Rodolfo Wilkins MD    anidulafungin (ERAXIS) 100 mg in 0.9% sodium chloride 130 mL IVPB, 100 mg, IntraVENous, Q24H, Manjeet Sandoval MD, 100 mg at 05/16/21 2046    balsam peru-castor oiL (VENELEX) ointment, , Topical, BID, Shane Del Cid MD, Given at 05/17/21 0959    albumin human 25% (BUMINATE) solution 50 g, 50 g, IntraVENous, DIALYSIS PRN, Jaspreet Mendoza MD    heparin (porcine) 1,000 unit/mL injection 1,900 Units, 1,900 Units, InterCATHeter, DIALYSIS PRN, 1,900 Units at 05/15/21 1115 **AND** heparin (porcine) 1,000 unit/mL injection 1,900 Units, 1,900 Units, InterCATHeter, DIALYSIS PRN, Suzanna Galarza MD, 1,900 Units at 05/15/21 1115    hydrALAZINE (APRESOLINE) 20 mg/mL injection 20 mg, 20 mg, IntraVENous, Q6H PRN, Braxton, Triny B, NP    hydrALAZINE (APRESOLINE) 20 mg/mL injection 10 mg, 10 mg, IntraVENous, Q6H PRN, Braxton, Triny B, NP, 10 mg at 05/13/21 1052    ranolazine ER (RANEXA) tablet 1,000 mg, 1,000 mg, Oral, BID, Braxton, Triny B, NP, 1,000 mg at 05/17/21 1007    hydrALAZINE (APRESOLINE) tablet 100 mg, 100 mg, Oral, TID, Braxton, Triny B, NP, 100 mg at 05/17/21 0959    isosorbide mononitrate ER (IMDUR) tablet 30 mg, 30 mg, Oral, DAILY, Braxton, Triny B, NP, 30 mg at 05/17/21 0959    tafamidis cap 61 mg (Patient Supplied), 61 mg, Oral, DAILY, Shane Del Cid MD, 61 mg at 05/17/21 1002    ondansetron (ZOFRAN) injection 4 mg, 4 mg, IntraVENous, Q4H PRN, Valeriy Morrow MD    epoetin amalia-epbx (RETACRIT) injection 20,000 Units, 20,000 Units, SubCUTAneous, Q TUE, THU & SAT, Suzanna Galarza MD, 20,000 Units at 05/15/21 2149    piperacillin-tazobactam (ZOSYN) 3.375 g in 0.9% sodium chloride (MBP/ADV) 100 mL MBP, 3.375 g, IntraVENous, Q12H, Lubna MCGREGOR MD, Last Rate: 25 mL/hr at 05/17/21 0727, 3.375 g at 05/17/21 0727    Vancomycin Pharmacy Dosing, , Other, PRN, Lubna Rivera MD    zinc oxide-cod liver oil (DESITIN) 40 % paste, , Topical, Q12H, Lubna MCGREGOR MD, Given at 05/17/21 0900    sodium chloride (NS) flush 5-10 mL, 5-10 mL, IntraVENous, PRN, Sergio Coker MD    sodium chloride (NS) flush 5-40 mL, 5-40 mL, IntraVENous, Q8H, Santa Cheatham MD, 40 mL at 05/17/21 5280    sodium chloride (NS) flush 5-40 mL, 5-40 mL, IntraVENous, PRN, Santa Cheatham MD    acetaminophen (TYLENOL) tablet 650 mg, 650 mg, Oral, Q6H PRN **OR** acetaminophen (TYLENOL) suppository 650 mg, 650 mg, Rectal, Q6H PRN, Santa Cheatham MD    polyethylene glycol [facility]) packet 17 g, 17 g, Oral, DAILY PRN, Santa Cheatham MD    albuterol-ipratropium (DUO-NEB) 2.5 MG-0.5 MG/3 ML, 3 mL, Nebulization, Q6H PRN, Santa Cheatham MD    Warfarin - Pharmacy to Dose, , Other, Rx Dosing/Monitoring, Petrona Tilley NP    sodium chloride (NS) flush 5-40 mL, 5-40 mL, IntraVENous, Q8H, Triny Limon NP, 10 mL at 05/16/21 1321    sodium chloride (NS) flush 5-40 mL, 5-40 mL, IntraVENous, PRN, Triny Limon NP    nystatin (MYCOSTATIN) 100,000 unit/gram powder, , Topical, PRN, Anupama Ronquillo MD    0.9% sodium chloride infusion, 11 mL/hr, IntraVENous, CONTINUOUS, Anupama MCGREGOR MD, Last Rate: 3 mL/hr at 05/15/21 0741, 3 mL/hr at 05/15/21 0741    PATIENT CARE TEAM:  Patient Care Team:  Schuyler Rice NP as PCP - General (Nurse Practitioner)  Eunice Gaxiola MD (Cardiology)  Pravin Cifuentes MD as Physician (Cardiology)  Iraida Cisneros LPN as Care Coordinator  Nadya Newman MD (Cardiothoracic Surgery)  Luci Allen MD (Cardiology)     Thank you for allowing me to participate in this patient's care. Cary Marsh NP  [facility] [address]  [address] [facility], [address]  Phone: [phone]      [facility] ATTENDING ADDENDUM    Patient was seen and examined in person. Data and notes were reviewed. I have discussed and agree with the plan as noted in the NP note above without further additions.     Helio Galarza MD PhD  Charan Jones

## 2021-05-17 NOTE — PROGRESS NOTES
[facility] VAD/Heart Failure Progress Note    Admit Date: 2021  POD:  * No surgery found *    Procedure:          Subjective:   Dyspnea and fatigue improving; good flows; Abx's      Objective:   Vitals:  Blood pressure 100/80, pulse (!) 119, temperature 98.7 °F (37.1 °C), resp. rate 22, height 5' 7\" (1.702 m), weight 237 lb 10.5 oz (107.8 kg), SpO2 95 %.   Temp (24hrs), Av.6 °F (37 °C), Min:98 °F (36.7 °C), Max:99.2 °F (37.3 °C)    Hemodynamics:   CO:     CI:     CVP: CVP (mmHg): 10 mmHg (21 0700)   SVR:     PAP Systolic:     PAP Diastolic:     PVR:     BY38:     SCV02:      VAD Interrogation: LVAD (Heartmate)  Pump Speed (RPM): 9400  Pump Flow (LPM): 4.7  Chatter in Lines: No  PI (Pulsitility Index): 6.5  Power: 5.6  MAP: 78   Test: Yes  Back Up  at Bedside & Labeled: Yes  Power Module Test: Yes  Driveline Site Care: No  Driveline Dressing: Clean, Dry, and Intact    EKG/Rhythm:      Extubation Date / Time:     CT Output:     Ventilator:  Ventilator Volumes  Vt Set (ml): 450 ml (21 0750)  Vt Exhaled (Machine Breath) (ml): 473 ml (21 0750)  Vt Spont (ml): 465 ml (21 0133)  Ve Observed (l/min): 10.1 l/min (21 0133)    Oxygen Therapy:  Oxygen Therapy  O2 Sat (%): 95 % (21 0747)  Pulse via Oximetry: 89 beats per minute (21 0351)  O2 Device: BIPAP (21 0429)  Skin Assessment: Clean, dry, & intact (21 0400)  Skin Protection for O2 Device: No (21 0800)  O2 Flow Rate (L/min): 1 l/min (21 0951)  O2 Temperature: (HME) (21 0328)  FIO2 (%): 40 % (21 1200)  ETCO2 (mmHg): 36 mmHg (21 0200)    CXR:    Admission Weight: Last Weight   Weight: 269 lb 10 oz (122.3 kg) Weight: 237 lb 10.5 oz (107.8 kg)     Intake / Output / Drain:  Current Shift:  0701 -  1900  In: 240 [P.O.:240]  Out: -   Last 24 hrs.:     Intake/Output Summary (Last 24 hours) at 2021 1029  Last data filed at 5/17/2021 0747  Gross per 24 hour   Intake 1270 ml   Output 0 ml   Net 1270 ml           Recent Labs     05/16/21  0332 05/15/21  0314   CPK 15* 20*     Recent Labs     05/17/21  0432 05/16/21  0332 05/15/21  0314    136 135*   K 4.2 4.0 4.4   CO2 26 28 25   BUN 41* 28* 49*   CREA 4.88* 3.63* 4.79*   * 119* 118*   PHOS 3.9 3.9 5.1*   MG 2.4 2.5* 2.9*   WBC 3.7 4.1 4.5   HGB 8.6* 8.8* 7.7*   HCT 28.3* 29.9* 26.8*   * 134* 126*     Recent Labs     05/17/21  0432 05/16/21  0332 05/15/21  0314   INR 4.1* 3.1* 1.9*   PTP 40.6* 31.0* 19.1*   APTT  --   --  76.7*     No lab exists for component: PBNP        Current Facility-Administered Medications:     warfarin - HOLD today 5/17, , Other, ONCE, Triny Limon, NP    senna-docusate (PERICOLACE) 8.6-50 mg per tablet 1 Tab, 1 Tab, Oral, DAILY, Triny Limon NP    glucose chewable tablet 16 g, 4 Tab, Oral, PRN, Elda Pineda MD    dextrose (D50W) injection syrg 12.5-25 g, 25-50 mL, IntraVENous, PRN, Elda Pineda MD    glucagon [facility]) injection 1 mg, 1 mg, IntraMUSCular, PRN, Elda Pineda MD    insulin lispro (HUMALOG) injection, , SubCUTAneous, AC&HS, Amber Thorpe MD, Stopped at 05/16/21 1630    famotidine (PEPCID) tablet 20 mg, 20 mg, Oral, DAILY, Toribio Goodpasture, MD, 20 mg at 05/17/21 0959    benzocaine-zinc cl-benzalkonium cl (ORAJEL) 20-0.1-0.02 % mucosal gel, , Oral, PRN, Toribio Goodpasture, MD    magic mouthwash cpd (without sucralfate), 5 mL, Oral, DAILY PRN, Toribio Goodpasture, MD    anidulafungin (ERAXIS) 100 mg in 0.9% sodium chloride 130 mL IVPB, 100 mg, IntraVENous, Q24H, Dav Hooper MD, 100 mg at 05/16/21 2046    balsam peru-castor oiL (VENELEX) ointment, , Topical, BID, Toribio Goodpasture, MD, Given at 05/17/21 0959    albumin human 25% (BUMINATE) solution 50 g, 50 g, IntraVENous, DIALYSIS PRN, Jaspreet Mendoza MD    heparin (porcine) 1,000 unit/mL injection 1,900 Units, 1,900 Units, InterCATHeter, DIALYSIS PRN, 1,900 Units at 05/15/21 1115 **AND** heparin (porcine) 1,000 unit/mL injection 1,900 Units, 1,900 Units, InterCATHeter, DIALYSIS PRN, Jaspreet Mendoza MD, 1,900 Units at 05/15/21 1115    hydrALAZINE (APRESOLINE) 20 mg/mL injection 20 mg, 20 mg, IntraVENous, Q6H PRN, Braxton, Triny B, NP    hydrALAZINE (APRESOLINE) 20 mg/mL injection 10 mg, 10 mg, IntraVENous, Q6H PRN, Braxton, Triny B, NP, 10 mg at 05/13/21 1052    ranolazine ER (RANEXA) tablet 1,000 mg, 1,000 mg, Oral, BID, Braxton, Triny B, NP, 1,000 mg at 05/17/21 1007    hydrALAZINE (APRESOLINE) tablet 100 mg, 100 mg, Oral, TID, Braxton, Triny B, NP, 100 mg at 05/17/21 0959    isosorbide mononitrate ER (IMDUR) tablet 30 mg, 30 mg, Oral, DAILY, Braxton, Triny B, NP, 30 mg at 05/17/21 0959    tafamidis cap 61 mg (Patient Supplied), 61 mg, Oral, DAILY, Vivien Cisneros MD, 61 mg at 05/17/21 1002    ondansetron (ZOFRAN) injection 4 mg, 4 mg, IntraVENous, Q4H PRN, Valeriy Morrow MD    epoetin amalia-epbx (RETACRIT) injection 20,000 Units, 20,000 Units, SubCUTAneous, Q TUE, THU & SAT, Ty Zavaleta MD, 20,000 Units at 05/15/21 2149    piperacillin-tazobactam (ZOSYN) 3.375 g in 0.9% sodium chloride (MBP/ADV) 100 mL MBP, 3.375 g, IntraVENous, Q12H, Renetta MCGREGOR MD, Last Rate: 25 mL/hr at 05/17/21 0727, 3.375 g at 05/17/21 0727    Vancomycin Pharmacy Dosing, , Other, PRN, Renetta Ontiveros MD    zinc oxide-cod liver oil (DESITIN) 40 % paste, , Topical, Q12H, Renetta MCGREGOR MD, Given at 05/17/21 0900    sodium chloride (NS) flush 5-10 mL, 5-10 mL, IntraVENous, PRN, Zi Phillip MD    sodium chloride (NS) flush 5-40 mL, 5-40 mL, IntraVENous, Q8H, Aure Pereira MD, 40 mL at 05/17/21 0728    sodium chloride (NS) flush 5-40 mL, 5-40 mL, IntraVENous, PRN, Aure Pereira MD    acetaminophen (TYLENOL) tablet 650 mg, 650 mg, Oral, Q6H PRN **OR** acetaminophen (TYLENOL) suppository 650 mg, 650 mg, Rectal, Q6H PRN, Aure Pereira, MD    albuterol-ipratropium (DUO-NEB) 2.5 MG-0.5 MG/3 ML, 3 mL, Nebulization, Q6H PRN, Alba Miner MD    Warfarin - Pharmacy to Dose, , Other, Rx Dosing/Monitoring, Ney Walter, NP    sodium chloride (NS) flush 5-40 mL, 5-40 mL, IntraVENous, Q8H, Braxton, Triny B, NP, 10 mL at 05/16/21 1321    sodium chloride (NS) flush 5-40 mL, 5-40 mL, IntraVENous, PRN, Braxton, Triny B, NP    nystatin (MYCOSTATIN) 100,000 unit/gram powder, , Topical, PRN, Carolina Chaviar MD    0.9% sodium chloride infusion, 11 mL/hr, IntraVENous, CONTINUOUS, [place] MCGREGOR MD, Last Rate: 3 mL/hr at 05/15/21 0741, 3 mL/hr at 05/15/21 0741    A/P  S/P LVAD - good flows  Renal failure - HD  Urosepsis - Abx's  Need for anticoagulation - coumadin      Risk of morbidity and mortality - high  Medical decision making - high complexity    Signed By: Lucius Bland MD

## 2021-05-17 NOTE — PROGRESS NOTES
Orders received, chart reviewed and patient evaluated by occupational therapy. Pending progression with skilled acute occupational therapy, recommend:  Therapy 3 hours per day 5-7 days per weekvs home with [address] [place] [address] and assist.      Recommend with nursing ADLs with supervision/setup, OOB to chair 3x/day and toileting via beside commode 2 assist and rollator. Thank you for completing as able in order to maintain patient strength, endurance and independence. Full evaluation to follow.

## 2021-05-17 NOTE — PROGRESS NOTES
[facility] VAD/Heart Failure Progress Note    Admit Date: 2021  POD:  * No surgery found *    Procedure:          Subjective:   Dyspnea continues to improve; good flows; feeling better     Objective:   Vitals:  Blood pressure 100/80, pulse 86, temperature 99 °F (37.2 °C), resp. rate 29, height 5' 7\" (1.702 m), weight 237 lb 10.5 oz (107.8 kg), SpO2 96 %.   Temp (24hrs), Av.7 °F (37.1 °C), Min:98 °F (36.7 °C), Max:99.2 °F (37.3 °C)    Hemodynamics:   CO:     CI:     CVP: CVP (mmHg): 10 mmHg (21 0700)   SVR:     PAP Systolic:     PAP Diastolic:     PVR:     TF51:     SCV02:      VAD Interrogation: LVAD (Heartmate)  Pump Speed (RPM): 9400  Pump Flow (LPM): 4.7  Chatter in Lines: No  PI (Pulsitility Index): 6.5  Power: 5.6  MAP: 78   Test: Yes  Back Up  at Bedside & Labeled: Yes  Power Module Test: Yes  Driveline Site Care: No  Driveline Dressing: Clean, Dry, and Intact    EKG/Rhythm:      Extubation Date / Time:     CT Output:     Ventilator:  Ventilator Volumes  Vt Set (ml): 450 ml (21 0750)  Vt Exhaled (Machine Breath) (ml): 473 ml (21 0750)  Vt Spont (ml): 465 ml (21 0133)  Ve Observed (l/min): 10.1 l/min (21 0133)    Oxygen Therapy:  Oxygen Therapy  O2 Sat (%): 96 % (21 1125)  Pulse via Oximetry: 89 beats per minute (21 0351)  O2 Device: BIPAP (21 0429)  Skin Assessment: Clean, dry, & intact (21 0400)  Skin Protection for O2 Device: No (21 0800)  O2 Flow Rate (L/min): 1 l/min (21 0244)  O2 Temperature: (HME) (21 0328)  FIO2 (%): 40 % (21 1200)  ETCO2 (mmHg): 36 mmHg (21 0200)    CXR:    Admission Weight: Last Weight   Weight: 269 lb 10 oz (122.3 kg) Weight: 237 lb 10.5 oz (107.8 kg)     Intake / Output / Drain:  Current Shift:  0701 -  1900  In: 240 [P.O.:240]  Out: -   Last 24 hrs.:     Intake/Output Summary (Last 24 hours) at 2021 1149  Last data filed at 5/17/2021 0747  Gross per 24 hour   Intake 780 ml   Output 0 ml   Net 780 ml           Recent Labs     05/16/21  0332 05/15/21  0314   CPK 15* 20*     Recent Labs     05/17/21  0432 05/16/21  0332 05/15/21  0314    136 135*   K 4.2 4.0 4.4   CO2 26 28 25   BUN 41* 28* 49*   CREA 4.88* 3.63* 4.79*   * 119* 118*   PHOS 3.9 3.9 5.1*   MG 2.4 2.5* 2.9*   WBC 3.7 4.1 4.5   HGB 8.6* 8.8* 7.7*   HCT 28.3* 29.9* 26.8*   * 134* 126*     Recent Labs     05/17/21  0432 05/16/21  0332 05/15/21  0314   INR 4.1* 3.1* 1.9*   PTP 40.6* 31.0* 19.1*   APTT  --   --  76.7*     No lab exists for component: PBNP        Current Facility-Administered Medications:     warfarin - HOLD today 5/17, , Other, ONCE, Triny Limon, NP    senna-docusate (PERICOLACE) 8.6-50 mg per tablet 1 Tab, 1 Tab, Oral, DAILY, Triny Limon, NP    glucose chewable tablet 16 g, 4 Tab, Oral, PRN, Elda Pineda MD    dextrose (D50W) injection syrg 12.5-25 g, 25-50 mL, IntraVENous, PRN, Elda Pineda MD    glucagon [facility]) injection 1 mg, 1 mg, IntraMUSCular, PRN, Elda Pineda MD    insulin lispro (HUMALOG) injection, , SubCUTAneous, AC&HS, Arely Gordon MD, Stopped at 05/16/21 1630    famotidine (PEPCID) tablet 20 mg, 20 mg, Oral, DAILY, Kenzie Bishop MD, 20 mg at 05/17/21 0959    benzocaine-zinc cl-benzalkonium cl (ORAJEL) 20-0.1-0.02 % mucosal gel, , Oral, PRN, Kenzie Bishop MD    magic mouthwash cpd (without sucralfate), 5 mL, Oral, DAILY PRN, Kenzie Bishop MD    anidulafungin (ERAXIS) 100 mg in 0.9% sodium chloride 130 mL IVPB, 100 mg, IntraVENous, Q24H, Eryn Gallagher MD, 100 mg at 05/16/21 2046    balsam peru-castor oiL (VENELEX) ointment, , Topical, BID, Kenzie Bishop MD, Given at 05/17/21 0959    albumin human 25% (BUMINATE) solution 50 g, 50 g, IntraVENous, DIALYSIS PRN, Jaspreet Mendoza MD    heparin (porcine) 1,000 unit/mL injection 1,900 Units, 1,900 Units, InterCATHeter, DIALYSIS PRN, 1,900 Units at 05/15/21 1115 **AND** heparin (porcine) 1,000 unit/mL injection 1,900 Units, 1,900 Units, InterCATHeter, DIALYSIS PRN, Jaspreet Mendoza MD, 1,900 Units at 05/15/21 1115    hydrALAZINE (APRESOLINE) 20 mg/mL injection 20 mg, 20 mg, IntraVENous, Q6H PRN, Braxton, Triny B, NP    hydrALAZINE (APRESOLINE) 20 mg/mL injection 10 mg, 10 mg, IntraVENous, Q6H PRN, Braxton, Triny B, NP, 10 mg at 05/13/21 1052    ranolazine ER (RANEXA) tablet 1,000 mg, 1,000 mg, Oral, BID, Braxton, Triny B, NP, 1,000 mg at 05/17/21 1007    hydrALAZINE (APRESOLINE) tablet 100 mg, 100 mg, Oral, TID, Braxton, Triny B, NP, 100 mg at 05/17/21 0959    isosorbide mononitrate ER (IMDUR) tablet 30 mg, 30 mg, Oral, DAILY, Braxton, Triny B, NP, 30 mg at 05/17/21 0959    tafamidis cap 61 mg (Patient Supplied), 61 mg, Oral, DAILY, Darlin Villalba MD, 61 mg at 05/17/21 1002    ondansetron (ZOFRAN) injection 4 mg, 4 mg, IntraVENous, Q4H PRN, Valeriy Morrow MD    epoetin amalia-epbx (RETACRIT) injection 20,000 Units, 20,000 Units, SubCUTAneous, Q TUE, THU & SAT, Donald Ken MD, 20,000 Units at 05/15/21 2149    piperacillin-tazobactam (ZOSYN) 3.375 g in 0.9% sodium chloride (MBP/ADV) 100 mL MBP, 3.375 g, IntraVENous, Q12H, Fracisco MCGREGOR MD, Last Rate: 25 mL/hr at 05/17/21 0727, 3.375 g at 05/17/21 0727    Vancomycin Pharmacy Dosing, , Other, PRN, Fracisco Gaines MD    zinc oxide-cod liver oil (DESITIN) 40 % paste, , Topical, Q12H, Fracisco MCGREGOR MD, Given at 05/17/21 0900    sodium chloride (NS) flush 5-10 mL, 5-10 mL, IntraVENous, PRN, Paris Campbell MD    sodium chloride (NS) flush 5-40 mL, 5-40 mL, IntraVENous, Q8H, Gladis Booker MD, 40 mL at 05/17/21 0728    sodium chloride (NS) flush 5-40 mL, 5-40 mL, IntraVENous, PRN, Gladis Booker MD    acetaminophen (TYLENOL) tablet 650 mg, 650 mg, Oral, Q6H PRN **OR** acetaminophen (TYLENOL) suppository 650 mg, 650 mg, Rectal, Q6H PRN, Gladis Booker MD    albuterol-ipratropium (DUO-NEB) 2.5 MG-0.5 MG/3 ML, 3 mL, Nebulization, Q6H PRN, Marcello Ybarra MD    Warfarin - Pharmacy to Dose, , Other, Rx Dosing/Monitoring, Dominique Walter, NP    sodium chloride (NS) flush 5-40 mL, 5-40 mL, IntraVENous, Q8H, Braxton, Triny B, NP, 10 mL at 05/16/21 1321    sodium chloride (NS) flush 5-40 mL, 5-40 mL, IntraVENous, PRN, Braxton, Triny B, NP    nystatin (MYCOSTATIN) 100,000 unit/gram powder, , Topical, PRN, Myrna Wade MD    0.9% sodium chloride infusion, 11 mL/hr, IntraVENous, CONTINUOUS, Myrna MCGREGOR MD, Last Rate: 3 mL/hr at 05/15/21 0741, 3 mL/hr at 05/15/21 0741    A/P  S/P LVAD - good flows  Renal failure - HD  Urosepsis - Abx's  Need for anticoagulation - coumadin      Risk of morbidity and mortality - high  Medical decision making - high complexity    Signed By: Roxy Garcias MD

## 2021-05-17 NOTE — PROGRESS NOTES
[facility]   [address] [address], [facility] [address], [facility]  Phone: [phone]   [phone]       Nephrology Progress Note  Keshav Estrada     1950     [id number]  Date of Admission : 5/11/2021 05/17/21    CC: Follow up for ROCIO    Assessment and Plan   ROCIO on CKD:  - 2/2 progressive CKD and CRS  - dialysis initiated 4/26/21. Has RIJ permacath for access. Cx negative   - next HD tomorrow   - Out pt dialysis to be set up at [place], [place]     Sepsis :  UTI :  follow up Cx results. abx per primary team   Blood cx from Permacath : negative so far      Acute Hypoxic Resp failure :  - baseline severe COPD + chronic CHF + morbid obesity/ JED   - Pulm HTN   - extubated 5/13     Chronic systolic CHF, stage C NYHA class IV  Combined ischemic and nonischemic cardiomyopathy  S/p HM 2 LVAD implant 4/15/2017 by Dr. Elvi Arndt at [facility]  hx of recurrent VT : off mexiletine  chronic RV failure. 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 - per AHF team      Chronic anemia :   ATTR amyloidosis  - anemia of chronic disease   - continue HEIKE  MWF     Chronic A. fib. History of endometrial Ca       Interval History:  Seen and examined  Daughter concerned about her ongoing confusion   Remains on O2     Review of Systems: Pertinent items are noted in HPI.     Current Medications:   Current Facility-Administered Medications   Medication Dose Route Frequency    warfarin - HOLD today 5/17   Other ONCE    glucose chewable tablet 16 g  4 Tab Oral PRN    dextrose (D50W) injection syrg 12.5-25 g  25-50 mL IntraVENous PRN    glucagon (GLUCAGEN) injection 1 mg  1 mg IntraMUSCular PRN    insulin lispro (HUMALOG) injection   SubCUTAneous AC&HS    famotidine (PEPCID) tablet 20 mg  20 mg Oral DAILY    benzocaine-zinc cl-benzalkonium cl (ORAJEL) 20-0.1-0.02 % mucosal gel   Oral PRN    magic mouthwash cpd (without sucralfate)  5 mL Oral DAILY PRN    anidulafungin (ERAXIS) 100 mg in 0.9% sodium chloride 130 mL IVPB  100 mg IntraVENous Q24H    balsam peru-castor oiL (VENELEX) ointment   Topical BID    albumin human 25% (BUMINATE) solution 50 g  50 g IntraVENous DIALYSIS PRN    heparin (porcine) 1,000 unit/mL injection 1,900 Units  1,900 Units InterCATHeter DIALYSIS PRN    And    heparin (porcine) 1,000 unit/mL injection 1,900 Units  1,900 Units InterCATHeter DIALYSIS PRN    hydrALAZINE (APRESOLINE) 20 mg/mL injection 20 mg  20 mg IntraVENous Q6H PRN    hydrALAZINE (APRESOLINE) 20 mg/mL injection 10 mg  10 mg IntraVENous Q6H PRN    ranolazine ER (RANEXA) tablet 1,000 mg  1,000 mg Oral BID    hydrALAZINE (APRESOLINE) tablet 100 mg  100 mg Oral TID    isosorbide mononitrate ER (IMDUR) tablet 30 mg  30 mg Oral DAILY    tafamidis cap 61 mg (Patient Supplied)  61 mg Oral DAILY    ondansetron (ZOFRAN) injection 4 mg  4 mg IntraVENous Q4H PRN    epoetin amalia-epbx (RETACRIT) injection 20,000 Units  20,000 Units SubCUTAneous Q TUE, THU & SAT    piperacillin-tazobactam (ZOSYN) 3.375 g in 0.9% sodium chloride (MBP/ADV) 100 mL MBP  3.375 g IntraVENous Q12H    Vancomycin Pharmacy Dosing   Other PRN    zinc oxide-cod liver oil (DESITIN) 40 % paste   Topical Q12H    sodium chloride (NS) flush 5-10 mL  5-10 mL IntraVENous PRN    sodium chloride (NS) flush 5-40 mL  5-40 mL IntraVENous Q8H    sodium chloride (NS) flush 5-40 mL  5-40 mL IntraVENous PRN    acetaminophen (TYLENOL) tablet 650 mg  650 mg Oral Q6H PRN    Or    acetaminophen (TYLENOL) suppository 650 mg  650 mg Rectal Q6H PRN    polyethylene glycol (MIRALAX) packet 17 g  17 g Oral DAILY PRN    albuterol-ipratropium (DUO-NEB) 2.5 MG-0.5 MG/3 ML  3 mL Nebulization Q6H PRN    Warfarin - Pharmacy to Dose   Other Rx Dosing/Monitoring    sodium chloride (NS) flush 5-40 mL  5-40 mL IntraVENous Q8H    sodium chloride (NS) flush 5-40 mL  5-40 mL IntraVENous PRN    nystatin (MYCOSTATIN) 100,000 unit/gram powder   Topical PRN    0.9% sodium chloride infusion  11 mL/hr IntraVENous CONTINUOUS      Allergies   Allergen Reactions    Benzodiazepines Other (comments)     Respiratory issues       Objective:  Vitals:    Vitals:    05/16/21 2349 05/17/21 0429 05/17/21 0727 05/17/21 0747   Pulse: 93 100  (!) 119   Resp: 26 24 22   Temp: 99.2 °F (37.3 °C) 98.8 °F (37.1 °C)  98.7 °F (37.1 °C)   TempSrc:       SpO2: 96% 96%  95%   Weight:   107.8 kg (237 lb 10.5 oz)    Height:         Intake and Output:  05/17 0701 - 05/17 1900  In: 240 [P.O.:240]  Out: -   05/15 1901 - 05/17 0700  In: 1623 [P.O.:1020; I.V.:507]  Out: 0     Physical Examination:    General: Obese   HEENT            NAD  Neck:  permacath +  Resp:  Lungs clear B/L, no wheezing   CV:  RRR,  S1,S2  GI:  Soft, NT, + BS  Neurologic:  AAO X 3   Psych:             Normal affect and mood       []    High complexity decision making was performed  []    Patient is at high-risk of decompensation with multiple organ involvement    Lab Data Personally Reviewed: I have reviewed all the pertinent labs, microbiology data and radiology studies during assessment.     Recent Labs     05/17/21  0432 05/16/21  0332 05/15/21  0314    136 135*   K 4.2 4.0 4.4    103 101   CO2 26 28 25   * 119* 118*   BUN 41* 28* 49*   CREA 4.88* 3.63* 4.79*   CA 8.7 9.1 8.8   MG 2.4 2.5* 2.9*   PHOS 3.9 3.9 5.1*   ALB 3.1* 3.4* 3.2*   ALT 9* 10* 9*   INR 4.1* 3.1* 1.9*     Recent Labs     05/17/21  0432 05/16/21  0332 05/15/21  0314   WBC 3.7 4.1 4.5   HGB 8.6* 8.8* 7.7*   HCT 28.3* 29.9* 26.8*   * 134* 126*     No results found for: SDES  Lab Results   Component Value Date/Time    Culture result: CANDIDA ALBICANS (A) 05/13/2021 11:29 AM    Culture result: RARE DIPHTHEROIDS (A) 05/12/2021 04:29 AM    Culture result: LIGHT YEAST, (APPARENT CANDIDA ALBICANS) (A) 05/11/2021 07:09 PM    Culture result: LIGHT NORMAL RESPIRATORY BETO 05/11/2021 07:09 PM    Culture result: NO GROWTH 5 DAYS 05/11/2021 04:30 PM Recent Results (from the past 24 hour(s))   GLUCOSE, POC    Collection Time: 05/16/21  5:37 PM   Result Value Ref Range    Glucose (POC) 129 (H) 65 - 117 mg/dL    Performed by Caroline العلي    GLUCOSE, POC    Collection Time: 05/16/21 10:13 PM   Result Value Ref Range    Glucose (POC) 128 (H) 65 - 117 mg/dL    Performed by Tania 110    Collection Time: 05/17/21  4:32 AM   Result Value Ref Range    Magnesium 2.4 1.6 - 2.4 mg/dL   PHOSPHORUS    Collection Time: 05/17/21  4:32 AM   Result Value Ref Range    Phosphorus 3.9 2.6 - 4.7 MG/DL   PROTHROMBIN TIME + INR    Collection Time: 05/17/21  4:32 AM   Result Value Ref Range    INR 4.1 (H) 0.9 - 1.1      Prothrombin time 40.6 (H) 9.0 - 11.1 sec   PROCALCITONIN    Collection Time: 05/17/21  4:32 AM   Result Value Ref Range    Procalcitonin 2.21 ng/mL   LD    Collection Time: 05/17/21  4:32 AM   Result Value Ref Range     81 - 246 U/L   CBC WITH AUTOMATED DIFF    Collection Time: 05/17/21  4:32 AM   Result Value Ref Range    WBC 3.7 3.6 - 11.0 K/uL    RBC 3.08 (L) 3.80 - 5.20 M/uL    HGB 8.6 (L) 11.5 - 16.0 g/dL    HCT 28.3 (L) 35.0 - 47.0 %    MCV 91.9 80.0 - 99.0 FL    MCH 27.9 26.0 - 34.0 PG    MCHC 30.4 30.0 - 36.5 g/dL    RDW 19.9 (H) 11.5 - 14.5 %    PLATELET 056 (L) 696 - 400 K/uL    MPV 9.0 8.9 - 12.9 FL    NRBC 0.0 0  WBC    ABSOLUTE NRBC 0.00 0.00 - 0.01 K/uL    NEUTROPHILS 73 32 - 75 %    LYMPHOCYTES 11 (L) 12 - 49 %    MONOCYTES 13 5 - 13 %    EOSINOPHILS 0 0 - 7 %    BASOPHILS 1 0 - 1 %    IMMATURE GRANULOCYTES 2 (H) 0.0 - 0.5 %    ABS. NEUTROPHILS 2.7 1.8 - 8.0 K/UL    ABS. LYMPHOCYTES 0.4 (L) 0.8 - 3.5 K/UL    ABS. MONOCYTES 0.5 0.0 - 1.0 K/UL    ABS. EOSINOPHILS 0.0 0.0 - 0.4 K/UL    ABS. BASOPHILS 0.0 0.0 - 0.1 K/UL    ABS. IMM.  GRANS. 0.1 (H) 0.00 - 0.04 K/UL    DF SMEAR SCANNED      RBC COMMENTS ANISOCYTOSIS  1+       METABOLIC PANEL, COMPREHENSIVE    Collection Time: 05/17/21  4:32 AM   Result Value Ref Range Sodium 136 136 - 145 mmol/L    Potassium 4.2 3.5 - 5.1 mmol/L    Chloride 101 97 - 108 mmol/L    CO2 26 21 - 32 mmol/L    Anion gap 9 5 - 15 mmol/L    Glucose 111 (H) 65 - 100 mg/dL    BUN 41 (H) 6 - 20 MG/DL    Creatinine 4.88 (H) 0.55 - 1.02 MG/DL    BUN/Creatinine ratio 8 (L) 12 - 20      GFR est AA 11 (L) >60 ml/min/1.73m2    GFR est non-AA 9 (L) >60 ml/min/1.73m2    Calcium 8.7 8.5 - 10.1 MG/DL    Bilirubin, total 0.8 0.2 - 1.0 MG/DL    ALT (SGPT) 9 (L) 12 - 78 U/L    AST (SGOT) 15 15 - 37 U/L    Alk. phosphatase 46 45 - 117 U/L    Protein, total 7.1 6.4 - 8.2 g/dL    Albumin 3.1 (L) 3.5 - 5.0 g/dL    Globulin 4.0 2.0 - 4.0 g/dL    A-G Ratio 0.8 (L) 1.1 - 2.2     GLUCOSE, POC    Collection Time: 05/17/21  7:17 AM   Result Value Ref Range    Glucose (POC) 113 65 - 117 mg/dL    Performed by Emiliano Gutierrez            Total time spent with patient:  xxx   min. Care Plan discussed with:  Patient     Family      RN      Consulting Physician [id number],         I have reviewed the flowsheets. Chart and Pertinent Notes have been reviewed. No change in PMH ,family and social history from Consult note.       Willy Dowd MD

## 2021-05-17 NOTE — PROGRESS NOTES
10:45 Called RT to get ABG per order to assess CO2    19:30 Bedside shift change report given to Yelitza (oncoming nurse) by Shiela Antonio RN (offgoing nurse). Report included the following information Kardex.

## 2021-05-17 NOTE — PROGRESS NOTES
Pharmacist Note  Warfarin Dosing  Consult provided for this 78 y. o.female to manage warfarin for LVAD    INR Goal: 1.8-2.5  Home regimen/ tablet size: 5 mg daily    Drugs that may increase INR: None  Drugs that may decrease INR: None  Other current anticoagulants/ drugs that may increase bleeding risk: heparin  Risk factors: Age > 65, dialysis  Daily INR ordered: YES    Recent Labs     05/17/21  0432 05/16/21  0332 05/15/21  0314   HGB 8.6* 8.8* 7.7*   INR 4.1* 3.1* 1.9*     Date               INR                  Dose  5/11  3.3  hold   5/12                1.9                    2 mg      5/13                 1.4                   4 mg     5/14                 1.4                   6 mg      5/15  1.9  4 mg  5/16  3.1  HOLD  5/17                 4.1                  HOLD                                                                                  Assessment/ Plan: Will HOLD warfarin today     Pharmacy will continue to monitor daily and adjust therapy as indicated. Please contact the pharmacist at x [phone] or  for outpatient recommendations if needed.

## 2021-05-17 NOTE — PROGRESS NOTES
Hospitalist Progress Note  Yohannes Liu MD  Answering service: [phone] from in house phone      Date of Service:  2021  NAME:  Marianne Roy  :  1950  MRN:  [id number]    Admission Summary:   70F p/w SOB. Interval history / Subjective:   Patient seen and examined at bedside, feels weak, lethargic, no appetite for food. Daughter present in room, hx revisited, came back one day after dc with resp failure     Assessment & Plan:     #. Sepsis: Source is not clear, only positive culture is for Candida in urine.   - ID following on Zosyn vancomycin and Eraxis    #. Acute on chronic respiratory failure :due to sepsis, chf and severe copd- resolved. - extubated. doing well on her home trilogy ventilator at night. #. Chronic RV failure  #. CAD: home regimen  #. Chronic sCHF: BiVentricular , s/p LVAD, s/p ICD. 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 H/o breast cancer () - s/p bilateral mastectomy/chemo   #. Lymphedema of LLE due to cancer treatment  #. COPD: no acute exacerbation. FEV1 50%  #. AFib: AC as above   #. ROCIO on CKD stage 4: progressive CKD and CRS- started HD   - Nephro following: R- IJ permacath in situ- OP HD set up at [facility], [address] [facility]     #. Hyperkalemia, resolved  #. Pulmonary hypertension  #. Morbid obesity, BMI 42.29 kg/m². #. DM2: A1c 7.2, SSI, AccuChecks, monitor  #. JED on Trilogy  #. Urinary incontinence, severe  #. Endometrial cancer ()- s/p Hysterectomy  #. HTN: chronic, stable, Home regimen, PRN BP meds. Monitor  #.  HLD: chronic, Stable, Home regimen    Code status: Full  DVT prophylaxis: warfarin  Care Plan discussed with: Patient/Family and Nurse  Disposition: TBD >2days     Hospital Problems  Date Reviewed: 4/25/2021          Codes Class Noted POA    Severe sepsis with acute organ dysfunction [facility]) ICD-10-CM: A41.9, R65.20  ICD-9-CM: 038.9, 995.92 Acute 5/11/2021 Yes        * (Principal) Acute encephalopathy ICD-10-CM: G93.40  ICD-9-CM: 348.30  5/11/2021 Unknown            Review of Systems:   Pertinent items are mentioned in interval history. Vital Signs:    Last 24hrs VS reviewed since prior progress note. Most recent are:  Visit Vitals  /80   Pulse 86   Temp 99 °F (37.2 °C)   Resp 29   Ht 5' 7\" (1.702 m)   Wt 107.8 kg (237 lb 10.5 oz)   SpO2 96%   BMI 37.22 kg/m²         Intake/Output Summary (Last 24 hours) at 5/17/2021 1127  Last data filed at 5/17/2021 0747  Gross per 24 hour   Intake 1270 ml   Output 0 ml   Net 1270 ml        Physical Examination:   Evaluated face to face and examined 05/17/21    General:  Alert, oriented, No acute distress, chronically very sick looking, obese [employer]  Card:  humming VAD sound. good peripheral perfusion  Resp:  No accessory muscle use, fair AE, no wheezes. no crepitations  Abd:  Soft, non-tender, non-distended, BS+  Extremities:  No cyanosis or clubbing, significant edema signs. Venous insufficiency discoloration. Neuro:  Grossly normal, no focal neuro deficits, follows commands, speech wnl. Psych:  Good insight, not agitated.     Data Review:    Review and/or order of clinical lab test  Review and/or order of tests in the radiology section of CPT  Review and/or order of tests in the medicine section of CPT  Labs:     Recent Labs     05/17/21 0432 05/16/21  0332   WBC 3.7 4.1   HGB 8.6* 8.8*   HCT 28.3* 29.9*   * 134*     Recent Labs     05/17/21  0432 05/16/21  0332 05/15/21  0314    136 135*   K 4.2 4.0 4.4    103 101   CO2 26 28 25   BUN 41* 28* 49*   CREA 4.88* 3.63* 4.79*   * 119* 118*   CA 8.7 9.1 8.8   MG 2.4 2.5* 2.9*   PHOS 3.9 3.9 5.1*     Recent Labs     05/17/21  0432 05/16/21  0332 05/15/21  0314   ALT 9* 10* 9*   AP 46 52 47   TBILI 0.8 0.7 0.7   TP 7.1 7.8 7.5   ALB 3.1* 3.4* 3.2*   GLOB 4.0 4.4* 4.3*     Recent Labs     05/17/21  0432 05/16/21  0332 05/15/21  0314   INR 4.1* 3.1* 1.9*   PTP 40.6* 31.0* 19.1*   APTT  --   --  76.7*      No results for input(s): FE, TIBC, PSAT, FERR in the last 72 hours. Lab Results   Component Value Date/Time    Folate 10.0 10/28/2020 03:56 AM      No results for input(s): PH, PCO2, PO2 in the last 72 hours.   Recent Labs     05/16/21  0332 05/15/21  0314   CPK 15* 20*     Lab Results   Component Value Date/Time    Cholesterol, total 129 04/16/2021 10:40 AM    HDL Cholesterol 33 04/16/2021 10:40 AM    LDL, calculated 74 04/16/2021 10:40 AM    Triglyceride 110 04/16/2021 10:40 AM    CHOL/HDL Ratio 3.9 04/16/2021 10:40 AM     Lab Results   Component Value Date/Time    Glucose (POC) 113 05/17/2021 07:17 AM    Glucose (POC) 128 (H) 05/16/2021 10:13 PM    Glucose (POC) 129 (H) 05/16/2021 05:37 PM    Glucose (POC) 273 (H) 05/11/2021 03:10 AM    Glucose (POC) 91 05/06/2021 11:15 AM     Lab Results   Component Value Date/Time    Color DARK YELLOW 05/11/2021 03:57 AM    Appearance TURBID (A) 05/11/2021 03:57 AM    Specific gravity 1.029 05/11/2021 03:57 AM    Specific gravity 1.013 11/09/2020 12:05 AM    pH (UA) 5.0 05/11/2021 03:57 AM    Protein 100 (A) 05/11/2021 03:57 AM    Glucose Negative 05/11/2021 03:57 AM    Ketone TRACE (A) 05/11/2021 03:57 AM    Bilirubin Negative 04/18/2021 05:08 PM    Urobilinogen 0.2 05/11/2021 03:57 AM    Nitrites Positive (A) 05/11/2021 03:57 AM    Leukocyte Esterase LARGE (A) 05/11/2021 03:57 AM    Epithelial cells MODERATE (A) 05/11/2021 03:57 AM    Bacteria 1+ (A) 05/11/2021 03:57 AM    WBC >100 (H) 05/11/2021 03:57 AM    RBC 20-50 05/11/2021 03:57 AM     Medications Reviewed:     Current Facility-Administered Medications   Medication Dose Route Frequency  warfarin - HOLD today 5/17   Other ONCE    senna-docusate (PERICOLACE) 8.6-50 mg per tablet 1 Tab  1 Tab Oral DAILY    glucose chewable tablet 16 g  4 Tab Oral PRN    dextrose (D50W) injection syrg 12.5-25 g  25-50 mL IntraVENous PRN    glucagon (GLUCAGEN) injection 1 mg  1 mg IntraMUSCular PRN    insulin lispro (HUMALOG) injection   SubCUTAneous AC&HS    famotidine (PEPCID) tablet 20 mg  20 mg Oral DAILY    benzocaine-zinc cl-benzalkonium cl (ORAJEL) 20-0.1-0.02 % mucosal gel   Oral PRN    magic mouthwash cpd (without sucralfate)  5 mL Oral DAILY PRN    anidulafungin (ERAXIS) 100 mg in 0.9% sodium chloride 130 mL IVPB  100 mg IntraVENous Q24H    balsam peru-castor oiL (VENELEX) ointment   Topical BID    albumin human 25% (BUMINATE) solution 50 g  50 g IntraVENous DIALYSIS PRN    heparin (porcine) 1,000 unit/mL injection 1,900 Units  1,900 Units InterCATHeter DIALYSIS PRN    And    heparin (porcine) 1,000 unit/mL injection 1,900 Units  1,900 Units InterCATHeter DIALYSIS PRN    hydrALAZINE (APRESOLINE) 20 mg/mL injection 20 mg  20 mg IntraVENous Q6H PRN    hydrALAZINE (APRESOLINE) 20 mg/mL injection 10 mg  10 mg IntraVENous Q6H PRN    ranolazine ER (RANEXA) tablet 1,000 mg  1,000 mg Oral BID    hydrALAZINE (APRESOLINE) tablet 100 mg  100 mg Oral TID    isosorbide mononitrate ER (IMDUR) tablet 30 mg  30 mg Oral DAILY    tafamidis cap 61 mg (Patient Supplied)  61 mg Oral DAILY    ondansetron (ZOFRAN) injection 4 mg  4 mg IntraVENous Q4H PRN    epoetin amalia-epbx (RETACRIT) injection 20,000 Units  20,000 Units SubCUTAneous Q TUE, THU & SAT    piperacillin-tazobactam (ZOSYN) 3.375 g in 0.9% sodium chloride (MBP/ADV) 100 mL MBP  3.375 g IntraVENous Q12H    Vancomycin Pharmacy Dosing   Other PRN    zinc oxide-cod liver oil (DESITIN) 40 % paste   Topical Q12H    sodium chloride (NS) flush 5-10 mL  5-10 mL IntraVENous PRN    sodium chloride (NS) flush 5-40 mL  5-40 mL IntraVENous Q8H    sodium chloride (NS) flush 5-40 mL  5-40 mL IntraVENous PRN    acetaminophen (TYLENOL) tablet 650 mg  650 mg Oral Q6H PRN    Or    acetaminophen (TYLENOL) suppository 650 mg  650 mg Rectal Q6H PRN    albuterol-ipratropium (DUO-NEB) 2.5 MG-0.5 MG/3 ML  3 mL Nebulization Q6H PRN    Warfarin - Pharmacy to Dose   Other Rx Dosing/Monitoring    sodium chloride (NS) flush 5-40 mL  5-40 mL IntraVENous Q8H    sodium chloride (NS) flush 5-40 mL  5-40 mL IntraVENous PRN    nystatin (MYCOSTATIN) 100,000 unit/gram powder   Topical PRN    0.9% sodium chloride infusion  11 mL/hr IntraVENous CONTINUOUS   ______________________________________________________________________  EXPECTED LENGTH OF STAY: 4d 21h  ACTUAL LENGTH OF STAY:          6               Danyelle Pierre MD

## 2021-05-17 NOTE — PROGRESS NOTES
Problem: Self Care Deficits Care Plan (Adult)  Goal: *Acute Goals and Plan of Care (Insert Text)  Description:   FUNCTIONAL STATUS PRIOR TO ADMISSION: Patient was modified independent using a rollator for functional mobility prior to initial hospitalization in [facility] and transfer to [facility]. HOME SUPPORT: The patient lived alone with LTC aides to assist her however now her daughter plans to live with her post discharge. Occupational Therapy Goals  Initiated 5/17/2021  1. Patient will perform grooming with modified independence within 7 day(s). 2.  Patient will perform bathing with moderate assistance  within 7 day(s). 3.  Patient will perform lower body dressing with moderate assistance  within 7 day(s). 4.  Patient will perform toilet transfers with minimal assistance/contact guard assist within 7 day(s). 5.  Patient will perform all aspects of toileting with moderate assistance  within 7 day(s). 6.  Patient will participate in upper extremity therapeutic exercise/activities with supervision/set-up for 5 minutes within 7 day(s). 7.  Patient will utilize energy conservation techniques during functional activities with verbal cues within 7 day(s). Outcome: Progressing Towards Goal   OCCUPATIONAL THERAPY EVALUATION  Patient: Brandan Lerner (78 y.o. female)  Date: 5/17/2021  Primary Diagnosis: Acute encephalopathy [G93.40]        Precautions:Fall(prior LVAD)    ASSESSMENT  Based on the objective data described below, the patient presents with generalized weakness, intermittent confusion, impaired endurance, decreased activity tolerance and impaired balance s/p admission from [facility] for SOB and AMS requiring intubation from 5/11-5/13. Prior to stay at [facility], patient was living at home and getting assistance with self-care tasks from care aides. Patient also with LVAD HM II and gets dialysis Tues, Thursday, and Saturday (h/o BUE tremors without dialysis however none observed this session).  Patient continues with AMS, however daughter present during evaluation and stating it is improving. Recommend IPR vs home with [address] and assistance pending medical and functional progression. Current Level of Function Impacting Discharge (ADLs/self-care): MIN A x 2 transfers using rollator; up to MAX A LB ADLs    Functional Outcome Measure: The patient scored 30/100 on the Barthel Index outcome measure which is indicative of 70% ADL impairment. Other factors to consider for discharge: dialysis; LVAD; safety awareness     Patient will benefit from skilled therapy intervention to address the above noted impairments. PLAN :  Recommendations and Planned Interventions: self care training, functional mobility training, therapeutic exercise, balance training, therapeutic activities, endurance activities, patient education, home safety training and family training/education    Frequency/Duration: Patient will be followed by occupational therapy 5 times a week to address goals. Recommendation for discharge: (in order for the patient to meet his/her long term goals)  Therapy 3 hours per day 5-7 days per week vs home with HHOT and supper from daughter (daughter works from home)    This discharge recommendation:  Has not yet been discussed the attending provider and/or case management    IF patient discharges home will need the following DME: patient owns DME required for discharge       SUBJECTIVE:   Patient stated I live with my  (patient intermittently confused).     OBJECTIVE DATA SUMMARY:   HISTORY:   Past Medical History:   Diagnosis Date    Asthma     Cancer ([facility] [address]     breast    Cancer ([facility] [address]     endometrial    Congestive heart failure, unspecified     CRI (chronic renal insufficiency)     Depression     Diabetes (HCC)     Hypertension     Severe sepsis with acute organ dysfunction ([facility] [address] 5/11/2021     Past Surgical History:   Procedure Laterality Date    HX HERNIA REPAIR      HX HYSTERECTOMY  HX MASTECTOMY      IR INSERT NON TUNL CVC OVER 5 YRS  4/26/2021    IR INSERT TUNL CVC W/O PORT OVER 5 YR  5/5/2021       Expanded or extensive additional review of patient history:     Home Situation  Home Environment: Private residence  # Steps to Enter: 1  One/Two Story Residence: One story  Living Alone: Yes  Support Systems: Family member(s)(daughter)  Patient Expects to be Discharged to[de-identified] Private residence  Current DME Used/Available at Home: Mae Lover, rollator, Wheelchair, [facility], bedside, Hospital bed  Tub or Shower Type: Shower(however patient sponge bathes)    Hand dominance: Right (however eats with L hand)    EXAMINATION OF PERFORMANCE DEFICITS:  Cognitive/Behavioral Status:  Neurologic State: Alert  Orientation Level: Oriented X4(intermittent confusion in conversation per daughter)  Cognition: Decreased attention/concentration;Decreased command following  Perception: Appears intact  Perseveration: No perseveration noted  Safety/Judgement: Decreased insight into deficits; Decreased awareness of need for safety;Decreased awareness of need for assistance    Skin: exposed areas grossly intact; some BUE bruising    Edema: LUE    Hearing: Auditory  Auditory Impairment: None    Vision/Perceptual:                                Corrective Lenses: Reading glasses    Range of Motion:  BUE  AROM: Generally decreased, functional  PROM: Generally decreased, functional                      Strength:  BUE  Strength: Generally decreased, functional(LUE edema)                Coordination:  Coordination: Generally decreased, functional  Fine Motor Skills-Upper: Left Impaired;Right Intact    Gross Motor Skills-Upper: Left Intact; Right Intact    Tone & Sensation:  BUE  Tone: Normal  Sensation: Intact                      Balance:  Sitting: Impaired; Without support  Sitting - Static: Good (unsupported)  Sitting - Dynamic: Fair (occasional)  Standing: Impaired; With support  Standing - Static: Fair;Constant support  Standing - Dynamic : Fair;Constant support    Functional Mobility and Transfers for ADLs:  Bed Mobility:   Not observed. Transfers:  Sit to Stand: Minimum assistance;Assist x2; Additional time; Adaptive equipment  Stand to Sit: Minimum assistance;Assist x2; Additional time; Adaptive equipment    ADL Assessment:  Feeding: Independent    Oral Facial Hygiene/Grooming: Setup;Supervision(infer seated in recliner)    Bathing: Maximum assistance(infer A for periarea and BLE)    Upper Body Dressing: Minimum assistance(infer cues for sequencing; LVAD management)    Lower Body Dressing: Maximum assistance(infer A for socks, shoes, and threading)    Toileting: Maximum assistance(infer A for pericare/clothing management (incontinent))                ADL Intervention and task modifications:                           Lower Body Dressing Assistance  Slip on Shoes with Back: Maximum assistance         Cognitive Retraining  Safety/Judgement: Decreased insight into deficits; Decreased awareness of need for safety;Decreased awareness of need for assistance    Functional Measure:  Barthel Index:    Bathin  Bladder: 0  Bowels: 0  Groomin  Dressin  Feedin  Mobility: 0  Stairs: 0  Toilet Use: 5  Transfer (Bed to Chair and Back): 10  Total: 30/100        The Barthel ADL Index: Guidelines  1. The index should be used as a record of what a patient does, not as a record of what a patient could do. 2. The main aim is to establish degree of independence from any help, physical or verbal, however minor and for whatever reason. 3. The need for supervision renders the patient not independent. 4. A patient's performance should be established using the best available evidence. Asking the patient, friends/relatives and nurses are the usual sources, but direct observation and common sense are also important. However direct testing is not needed.   5. Usually the patient's performance over the preceding 24-48 hours is important, but occasionally longer periods will be relevant. 6. Middle categories imply that the patient supplies over 50 per cent of the effort. 7. Use of aids to be independent is allowed. Janessa Nieves., Barthel, D.W. ([id number]). Functional evaluation: the Barthel Index. [address] [facility] (14)2. FORREST Krishnan, Mague Montoya.Mark., [place], [address] [place] [address] Ave (1999). Measuring the change indisability after inpatient rehabilitation; comparison of the responsiveness of the Barthel Index and Functional [employer]oit Measure. Journal of Neurology, Neurosurgery, and Psychiatry, 66(4), 898-935. Nasrin Harrell, N.J.A, ROSLNY Still, & Kevin Riddle, M.A. (2004.) Assessment of post-stroke quality of life in cost-effectiveness studies: The usefulness of the Barthel Index and the EuroQoL-5D. Quality of Life Research, 15, 167-71       Occupational Therapy Evaluation Charge Determination   History Examination Decision-Making   LOW Complexity : Brief history review  MEDIUM Complexity : 3-5 performance deficits relating to physical, cognitive , or psychosocial skils that result in activity limitations and / or participation restrictions MEDIUM Complexity : Patient may present with comorbidities that affect occupational performnce. Miniml to moderate modification of tasks or assistance (eg, physical or verbal ) with assesment(s) is necessary to enable patient to complete evaluation       Based on the above components, the patient evaluation is determined to be of the following complexity level: LOW   Pain Rating:  No complaints    Activity Tolerance:   Fair and requires rest breaks    After treatment patient left in no apparent distress:    Sitting in chair, Call bell within reach and Caregiver / family present    COMMUNICATION/EDUCATION:   The patients plan of care was discussed with: Physical therapist and Registered nurse.      Home safety education was provided and the patient/caregiver indicated understanding., Patient/family have participated as able in goal setting and plan of care. and Patient/family agree to work toward stated goals and plan of care. This patients plan of care is appropriate for delegation to [facility].     Thank you for this referral.  Latisha Rivera  Time Calculation: 20 mins

## 2021-05-17 NOTE — CONSULTS
[address] Initial Visit Note     Subjective:       Teddy Jernigan is a 78 y.o. patient who is seen for management of ventricular ectopy. She was admitted on 05/11/2021 with AMS, had recently started dialysis, became altered between periods of dialysis. Arin Olguin found to have acute encephalopathy & hypoxic respiratory failure due to sepsis, CHF, & severe COPD.  Intubated, vented.  She has since been extubated. Continues antibiotics for chronic sternal wound infection.  Diagnosed with UTI during admission.  Blood cultures negative. She has central line in her left neck and right sided BIV ICD and dialysis catheter on right chest    NICM, biventricular failure, LVEF <15%, has LVAD (2017 at [facility]).  NYHA III chronic systolic CHF.  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  Intolerant of BB/ACEi/ARB/ARNi due to aTTR.  Held Tafamidis briefly due to NPO status, held hydralazine & Imdur briefly due to hypotension. On ranolazine for prior NSVT because she could not tolerate mexiletine or amiodarone. TSH WNL.  Electrolytes unremarkable today.  Occasional PVCs on monitor. Anticoagulated with warfarin, denies current bleeding issues. Previous:   Intolerant of mexiletine, had significant weakness and fell in the past.     Pacing rate 70 bpm to suppress PVCs.      Started mexiletine on 11/02/2020 during a recent admission for control of recurrent VT.  Her daughter then reported that she was unsteady, fatigued, nauseated, & shaky with Levaquin & mexiletine.  Antibiotic changed to cefepime IV.  Stopped mexiletine 11/2020, started ranolazine 500 mg po bid on 11/11/2020.  She did have subsequent run NSVT (8-15 beats, very wide complex, rate 150 bpm).       Admitted 11/09/2020 after a fall, suffered leg wound bleeding.  She had been discharged on Levaquin, still treated for leg cellulitis (Morganella morganii), has renal failure, advanced systolic CHF, & anemia.       Treated for abscess of sternal wound since late . S/p LVAD (04/15/2017) by Dr. Julian Pathak.       S/p PCI approx 2017.  Reports history of Plavix resistance.       Previously followed by [facility], now sees Dr. Lea Man.       Mother with CAD,  age 76.  Father  age [de-identified], CAD & MI. Merl Brow with valve replacement. Intolerant of spironolactone due to hyperkalemia. Primary cardiologist is Dr. Eder Sun.        Problem List   Severe sepsis with acute organ dysfunction (HCC) ICD-10-CM: A41.9, R65.20   ICD-9-CM: 038.9, 995.92 Acute 2021     * (Principal) Acute encephalopathy ICD-10-CM: G93.40   ICD-9-CM: 348.30 2021     Dyspnea ICD-10-CM: R06.00   ICD-9-CM: 786.09 2021     HAIRSTON (dyspnea on exertion) ICD-10-CM: R06.00   ICD-9-CM: 786.09 2021     Incontinence in female ICD-10-CM: R32   ICD-9-CM: 625.6 2020     LVAD (left ventricular assist device) present [facility]) ICD-10-CM: Z95.811   ICD-9-CM: V43.21 2020     Hospital discharge follow-up ICD-10-CM: [place]   ICD-9-CM: V67.59 2020     ICD (implantable cardioverter-defibrillator) battery depletion ICD-10-CM: Z45.02   ICD-9-CM: V53.32 2020     Coagulopathy ([facility] [address] ICD-10-CM: D68.9   ICD-9-CM: 286.9 11/10/2020     Open wound of left lower leg ICD-10-CM: R44.291Y   ICD-9-CM: 891.0 11/10/2020     Anemia ICD-10-CM: D64.9   ICD-9-CM: 285.9 2020     ROCIO (acute kidney injury) ([facility] ICD-10-CM: N17.9   ICD-9-CM: 584.9 2020     NSVT (nonsustained ventricular tachycardia) (HCC) ICD-10-CM: I47.2   ICD-9-CM: 427.1 2020     NICM (nonischemic cardiomyopathy) ([facility] ICD-10-CM: I42.8   ICD-9-CM: 425.4 2020     Coronary artery disease involving native coronary artery of native heart without angina pectoris ICD-10-CM: I25.10   ICD-9-CM: 414.01 2020     JED on CPAP ICD-10-CM: G47.33, Z99.89   ICD-9-CM: 327.23, V46.8 2020     Chronic venous insufficiency ICD-10-CM: L16.6   ICD-9-CM: 459.81 11/4/2020     Ulcer of right foot, limited to breakdown of skin ([facility] ICD-10-CM: N94.633   ICD-9-CM: 707.15 11/4/2020     Acute on chronic systolic CHF (congestive heart failure) ([facility] ICD-10-CM: I50.23   ICD-9-CM: 428.23, 428.0 10/27/2020     Obesity, morbid ([facility] ICD-10-CM: E66.01   ICD-9-CM: 278.01 10/1/2020           Current Facility-Administered Medications   Medication Dose Route Frequency Provider Last Rate Last Admin   · warfarin - HOLD today 5/17 Other ONCE Triny Limon NP   · senna-docusate (PERICOLACE) 8.6-50 mg per tablet 1 Tab 1 Tab Oral DAILY BraxtonTriny cheatham, NP 1 Tab at 05/17/21 1237   · [START ON 5/18/2021] vancomycin random level with am labs on 5/18 @ 04:00 Other ONCE Joseph Grewal MD   · glucose chewable tablet 16 g 4 Tab Oral PRN Gwendolyn Beck MD   · dextrose (D50W) injection syrg 12.5-25 g 25-50 mL IntraVENous PRN Gwendolyn Beck MD   · glucagon (GLUCAGEN) injection 1 mg 1 mg IntraMUSCular PRN Gwendolyn Beck MD   · insulin lispro (HUMALOG) injection SubCUTAneous AC&HS Gwendolyn Beck MD Stopped at 05/16/21 1630   · famotidine (PEPCID) tablet 20 mg 20 mg Oral DAILY Harpreet Wolf MD 20 mg at 05/17/21 0959   · benzocaine-zinc cl-benzalkonium cl (ORAJEL) 20-0.1-0.02 % mucosal gel Oral PRN Harpreet Wolf MD   · magic mouthwash cpd (without sucralfate) 5 mL Oral DAILY PRN Harpreet Wolf MD   · anidulafungin (ERAXIS) 100 mg in 0.9% sodium chloride 130 mL IVPB 100 mg IntraVENous Q24H Ade Ibrahim  mg at 05/16/21 2046   · balsam peru-castor oiL (Solmon Ciara) ointment Topical BID Harpreet Wolf MD Given at 05/17/21 0920   · albumin human 25% (BUMINATE) solution 50 g 50 g IntraVENous DIALYSIS PRGARTH Rojas MD   · heparin (porcine) 1,000 unit/mL injection 1,900 Units 1,900 Units InterCATHeter DIALYSIS PRN Mary Rojas MD 1,900 Units at 05/15/21 1115   And   · heparin (porcine) 1,000 unit/mL injection 1,900 Units 1,900 Units InterCATHeter DIALYSIS PRN Andrew Mclaughlin MD 1,900 Units at 05/15/21 1115   · hydrALAZINE (APRESOLINE) 20 mg/mL injection 20 mg 20 mg IntraVENous Q6H PRN Braxton, Triny B, NP   · hydrALAZINE (APRESOLINE) 20 mg/mL injection 10 mg 10 mg IntraVENous Q6H PRN Braxton, Triny B, NP 10 mg at 05/13/21 1052   · ranolazine ER (RANEXA) tablet 1,000 mg 1,000 mg Oral BID Braxton, Triny B, NP 1,000 mg at 05/17/21 1007   · hydrALAZINE (APRESOLINE) tablet 100 mg 100 mg Oral TID Moshe Clap B,  mg at 05/17/21 0959   · isosorbide mononitrate ER (IMDUR) tablet 30 mg 30 mg Oral DAILY Braxton, Triny B, NP 30 mg at 05/17/21 0959   · tafamidis cap 61 mg (Patient Supplied) 61 mg Oral DAILY Shella Barthel, MD 61 mg at 05/17/21 1002   · ondansetron (ZOFRAN) injection 4 mg 4 mg IntraVENous Q4H PRN Leonel Mcdaniel MD   · epoetin amalia-epbx (RETACRIT) injection 20,000 Units 20,000 Units SubCUTAneous Q TUE, THU & SAT Andrew Mclaughlin MD 20,000 Units at 05/15/21 2149   · piperacillin-tazobactam (ZOSYN) 3.375 g in 0.9% sodium chloride (MBP/ADV) 100 mL MBP 3.375 g IntraVENous Q12H Christa MCGREGOR MD 25 mL/hr at 05/17/21 0727 3.375 g at 05/17/21 0727   · Vancomycin Pharmacy Dosing Other PRN Christa Aguilera MD   · zinc oxide-cod liver oil (DESITIN) 40 % paste Topical Q12H Christa Aguilera MD Given at 05/17/21 0900   · sodium chloride (NS) flush 5-10 mL 5-10 mL IntraVENous PRN Jonas Holman MD   · sodium chloride (NS) flush 5-40 mL 5-40 mL IntraVENous Q8H Eamon White MD 40 mL at 05/17/21 0728   · sodium chloride (NS) flush 5-40 mL 5-40 mL IntraVENous PRN Eamon White MD   · acetaminophen (TYLENOL) tablet 650 mg 650 mg Oral Q6H PRN Eamon White MD   Or   · acetaminophen (TYLENOL) suppository 650 mg 650 mg Rectal Q6H PRN Eamon White MD   · albuterol-ipratropium (DUO-NEB) 2.5 MG-0.5 MG/3 ML 3 mL Nebulization Q6H PRN Eamon White MD   · Warfarin - Pharmacy to Dose Other Rx Dosing/Monitoring Anjali Walter NP   · sodium chloride (NS) flush 5-40 mL 5-40 mL IntraVENous Q8H Triny Limon NP 40 mL at 05/17/21 1304   · sodium chloride (NS) flush 5-40 mL 5-40 mL IntraVENous PRN Triny Limon NP   · nystatin (MYCOSTATIN) 100,000 unit/gram powder Topical PRN See Serna MD   · 0.9% sodium chloride infusion 11 mL/hr IntraVENous CONTINUOUS See MCGREGOR MD 3 mL/hr at 05/15/21 0741 3 mL/hr at 05/15/21 0741     Allergies   Allergen Reactions   · Benzodiazepines Other (comments)   Respiratory issues     Past Medical History:   Diagnosis Date   · Asthma   · Cancer (HCC)   breast   · Cancer ([facility]   endometrial   · Congestive heart failure, unspecified   · CRI (chronic renal insufficiency)   · Depression   · Diabetes ([facility] [address] [facility]   · Hypertension   · Severe sepsis with acute organ dysfunction ([facility] 5/11/2021     Past Surgical History:   Procedure Laterality Date   · HX HERNIA REPAIR   · HX HYSTERECTOMY   · HX MASTECTOMY   · IR INSERT NON TUNL CVC OVER 5 YRS 4/26/2021   · IR INSERT TUNL CVC W/O PORT OVER 5 YR 5/5/2021     No family history on file. Social History     Tobacco Use   · Smoking status: Never Smoker   · Smokeless tobacco: Never Used   Substance Use Topics   · Alcohol use: Not Currently         Review of Systems: Review of all other systems otherwise negative. Constitutional: Negative for fever, chills, weight loss, malaise/fatigue. HEENT: Negative for nosebleeds, vision changes. Respiratory: Negative for cough, hemoptysis   Cardiovascular: Negative for chest pain, palpitations, + orthopnea, no claudication, leg swelling, syncope, and PND. + HAIRSTON   Gastrointestinal: Negative for nausea, vomiting, diarrhea, blood in stool and melena. Genitourinary: Negative for dysuria, and hematuria. Musculoskeletal: Negative for myalgias, + hip arthralgia. Skin: Negative for rash. Slow wound healing. Heme: Does not bleed or bruise easily.    Neurological: Negative for speech change and focal weakness       Objective:     Visit Vitals   /80 Comment: treatment ended. Blood returned   Pulse 85   Temp 98.3 °F (36.8 °C)   Resp 29   Ht 5' 7\" (1.702 m)   Wt 237 lb 10.5 oz (107.8 kg)   SpO2 93%   BMI 37.22 kg/m²       Physical Exam:   Constitutional: Well-developed and well-nourished. No respiratory distress. Head: Normocephalic and atraumatic. Eyes: Pupils are equal, round   ENT: Hearing normal   Neck: Supple. No JVD present. Cardiovascular: LVAD sounds. Pulmonary/Chest: Effort normal and breath sounds normal. No wheezes. Abdominal: Obese. Musculoskeletal: Moves extremities independently.     Vasc/lymphatic: 3+ bilat lower extremity edema. Neurological: Alert,oriented. Skin: bruises on forarms  Right chest ICD (LV off due to chronic left phrenic nerve stim)  Psychiatric: Normal mood and affect. Behavior is normal. Judgment and thought content normal.         Assessment/Plan:     Imaging/Studies:   Limited echo (05/11/2021): LVEF <15%, dilated LV, mild concentric LVH.  Mod dilated RV, mildly reduced RVEF.  Mild to mod TR.  Mild PH. Nuclear cardiac amyloid (11/04/2020): PYP scan strongly suggestive for ATTR cardiac amyloidosis based on H:CL ratio of 2 & semiquantitative score of 2. LHC (11/09/2018): 2 vessel disease with 90% OM, 80% PDA, DSA to PDA branch         Nusocket biventricular ICD (DOI 07/09/2010): LV lead output previously turned down due to persistent phrenic nerve stimso burden of PVCs are meaningless for CRT as she is not biventricular pacing.    Risk of infection with possible lead replacement outweighs benefit to revision it at this time, especially she has LVAD in place      NICM: Strong evidence for aTTR cardiac amyloidosis.  NYHA III chronic systolic CHF.  she is on Tafamidis  LVAD since 2017, speed limited by VT.  Intolerant of beta blocker, ACEi/ARB/ARNi due to aTTR.   no amiodarone      VT/PVC: Off label ranolazine as previously prescribed, was intolerant of mexiletine (weak and falls)  She is high risk for ablation and as many discussions in the past, her daughter and she preferred medication    Anticoagulation:  On warfarin due to LVAD, has target range 1.8-2.5 per advanced heart failure.           Thank you for involving me in this patient's care and please call with further concerns or questions. Lady Ness M.D.    Electrophysiology/Cardiology   [facility] [place]   [address], [address] [place] [phone] [id number] [facility], [address] [place] [address]   [phone]

## 2021-05-17 NOTE — PROGRESS NOTES
ID Progress Note  2021    Subjective:     Feeling better overall    Objective:     Antibiotics:  1. Vancomycin   2. Eraxis   3. Zosyn       Vitals:   Visit Vitals  /80 Comment: treatment ended. Blood returned   Pulse 84   Temp 99 °F (37.2 °C)   Resp 18   Ht 5' 7\" (1.702 m)   Wt 107.8 kg (237 lb 10.5 oz)   SpO2 96%   BMI 37.22 kg/m²        Tmax:  Temp (24hrs), Av.7 °F (37.1 °C), Min:98 °F (36.7 °C), Max:99.2 °F (37.3 °C)      Exam:  Lungs:  clear to auscultation bilaterally  Heart:  regular rate and rhythm  Abdomen:  soft, non-tender. Bowel sounds normal. No masses,  no organomegaly    Labs:      Recent Labs     21  0432 21  0332 05/15/21  0314   WBC 3.7 4.1 4.5   HGB 8.6* 8.8* 7.7*   * 134* 126*   BUN 41* 28* 49*   CREA 4.88* 3.63* 4.79*   AP 46 52 47   TBILI 0.8 0.7 0.7       Cultures:     No results found for: SDES  Lab Results   Component Value Date/Time    Culture result: CANDIDA ALBICANS (A) 2021 11:29 AM    Culture result: RARE DIPHTHEROIDS (A) 2021 04:29 AM    Culture result: LIGHT YEAST, (APPARENT CANDIDA ALBICANS) (A) 2021 07:09 PM    Culture result: LIGHT NORMAL RESPIRATORY BETO 2021 07:09 PM       Radiology:     Line/Insert Date:           Assessment:     1. Chronic SWI  2. Candida UTI--can treat with fluconazole if able  3. LVAD infection     Objective:     1. Contraindications to fluconazole  2.  Consider stopping other antibiotic therapy in face of negative cultures    Elias Rahman MD

## 2021-05-17 NOTE — PROGRESS NOTES
0530: per lab, mixing study was previously drawn on 5/15. It will not be processed. 0730: Bedside and Verbal shift change report given to Apple Minor (oncoming nurse) by Lam Stroud (offgoing nurse). Report included the following information SBAR, Kardex, Intake/Output, MAR, Recent Results and Cardiac Rhythm V Paced. Problem: Pressure Injury - Risk of  Goal: *Prevention of pressure injury  Description: Document Jaime Scale and appropriate interventions in the flowsheet. Outcome: Progressing Towards Goal  Note: Pressure Injury Interventions:  Sensory Interventions: Assess changes in LOC, Float heels, Maintain/enhance activity level, Turn and reposition approx. every two hours (pillows and wedges if needed)    Moisture Interventions: Absorbent underpads, Check for incontinence Q2 hours and as needed, Maintain skin hydration (lotion/cream), Moisture barrier    Activity Interventions: Increase time out of bed, Pressure redistribution bed/mattress(bed type), PT/OT evaluation    Mobility Interventions: Float heels, Pressure redistribution bed/mattress (bed type), PT/OT evaluation, Turn and reposition approx. every two hours(pillow and wedges)    Nutrition Interventions: Document food/fluid/supplement intake    Friction and Shear Interventions: Apply protective barrier, creams and emollients, Lift sheet, Lift team/patient mobility team    Turned approximately every 2 hours. Skin assess approximately every 4 hours. Problem: Falls - Risk of  Goal: *Absence of Falls  Description: Document Tawanda Munoz Fall Risk and appropriate interventions in the flowsheet.   Outcome: Progressing Towards Goal  Note: Fall Risk Interventions:  Mobility Interventions: Assess mobility with egress test, Communicate number of staff needed for ambulation/transfer, OT consult for ADLs, Patient to call before getting OOB, PT Consult for mobility concerns, Strengthening exercises (ROM-active/passive), Utilize walker, cane, or other assistive device    Mentation Interventions: Adequate sleep, hydration, pain control, Bed/chair exit alarm, Evaluate medications/consider consulting pharmacy, Familiar objects from home, Family/sitter at bedside, Increase mobility, More frequent rounding, Reorient patient, Update white board    Medication Interventions: Bed/chair exit alarm, Patient to call before getting OOB, Teach patient to arise slowly, Utilize gait belt for transfers/ambulation    Elimination Interventions: Bed/chair exit alarm, Call light in reach, Patient to call for help with toileting needs, Toileting schedule/hourly rounds    History of Falls Interventions: Door open when patient unattended  Call bell and personal effects within reach. Bed locked and in low position. Non skid footwear in place.

## 2021-05-17 NOTE — PROGRESS NOTES
Problem: Mobility Impaired (Adult and Pediatric)  Goal: *Acute Goals and Plan of Care (Insert Text)  Description: FUNCTIONAL STATUS PRIOR TO ADMISSION: Patient was modified independent using a rollator for functional mobility. HOME SUPPORT PRIOR TO ADMISSION: The patient lived alone with her daughter to provide assistance as needed. Recent prolonged admission to [facility], discharged to rehab then readmitted from rehab. Physical Therapy Goals  Initiated 5/16/2021  1. Patient will move from supine to sit and sit to supine  and scoot up and down in bed with minimal assistance/contact guard assist within 7 day(s). 2.  Patient will transfer from bed to chair and chair to bed with supervision/set-up using the least restrictive device within 7 day(s). 3.  Patient will perform sit to stand with supervision/set-up within 7 day(s). 4.  Patient will ambulate with supervision/set-up for 50 feet with the least restrictive device within 7 day(s). 5.  Patient will ascend/descend 1 stairs without handrail(s) with supervision/set-up within 7 day(s). Outcome: Progressing Towards Goal    PHYSICAL THERAPY TREATMENT  Patient: Lelia Reynoso (86 y.o. female)  Date: 5/17/2021  Diagnosis: Acute encephalopathy [G93.40] Acute encephalopathy       Precautions: Fall(prior LVAD)  Chart, physical therapy assessment, plan of care and goals were reviewed. ASSESSMENT  Patient continues with skilled PT services and is progressing towards goals. Pt was able to ambulate a short distance. Pt required encouragement due to fatigue on arrival. Pt confused but able to participate. Pt on batteries on arrival assisted with securing utilizing starr pack. Pt had slight decrease LE stability with gait.  .     Current Level of Function Impacting Discharge (mobility/balance): Min A x2    Other factors to consider for discharge: cognition, decrease mobility and independence          PLAN :  Patient continues to benefit from skilled intervention to address the above impairments. Continue treatment per established plan of care. to address goals. Recommendation for discharge: (in order for the patient to meet his/her long term goals)  Therapy 3 hours per day 5-7 days per week vs HHPT with assistance     This discharge recommendation:  Has not yet been discussed the attending provider and/or case management    IF patient discharges home will need the following DME: patient owns DME required for discharge       SUBJECTIVE:   Patient stated hello again.     OBJECTIVE DATA SUMMARY:   Critical Behavior:  Neurologic State: Alert  Orientation Level: Oriented X4(intermittent confusion in conversation per daughter)  Cognition: Decreased attention/concentration, Decreased command following  Safety/Judgement: Decreased insight into deficits, Decreased awareness of need for safety, Decreased awareness of need for assistance  Functional Mobility Training:  Bed Mobility:                    Transfers:  Sit to Stand: Minimum assistance;Assist x2; Additional time; Adaptive equipment  Stand to Sit: Minimum assistance;Assist x2; Additional time; Adaptive equipment                             Balance:  Sitting: Impaired; Without support  Sitting - Static: Good (unsupported)  Sitting - Dynamic: Fair (occasional)  Standing: Impaired; With support  Standing - Static: Fair;Constant support  Standing - Dynamic : Fair;Constant support  Ambulation/Gait Training:  Distance (ft): 5 Feet (ft)  Assistive Device: Gait belt;Walker, rollator(chair follow)  Ambulation - Level of Assistance: Contact guard assistance;Minimal assistance;Assist x2        Gait Abnormalities: Decreased step clearance        Base of Support: Widened        Step Length: Left shortened;Right shortened                    Stairs:               Therapeutic Exercises:     Pain Rating:  No complaints     Activity Tolerance:   requires frequent rest breaks    After treatment patient left in no apparent distress:   Sitting in chair, Call bell within reach and Caregiver / family present    COMMUNICATION/COLLABORATION:   The patients plan of care was discussed with: Registered nurse.      Antonio Vasquez PTA   Time Calculation: 25 mins

## 2021-05-18 NOTE — PROGRESS NOTES
Pharmacist Note - Vancomycin Dosing  LVAD infection. Possible aspiration. Abx regimen:  Vancomycin, Zosyn, and Eraxis  Inpatient dialysis schedule: Tuesday/Thursday/Saturday    Recent Labs     05/18/21  0354 05/17/21  0432 05/16/21  0332   WBC 3.7 3.7 4.1   CREA 5.74* 4.88* 3.63*   BUN 51* 41* 28*       A Random Level resulted at 18 mcg/mL which was obtained prior to planned HD today. Goal trough: = 20 - 25 mcg/mL for therapeutic goal of 15 - 20 mcg/mL (assuming ~35% removal by dialysis)       Plan: Will order 750 mg IV after HD today. Pharmacy will continue to monitor this patient daily for changes in clinical status and renal function.

## 2021-05-18 NOTE — PROGRESS NOTES
1930: Bedside shift change report given to PADILLA Anglees (oncoming nurse) by Glenn Parra RN  (offgoing nurse). Report included the following information SBAR, Kardex, Intake/Output, MAR, Recent Results, Med Rec Status and Cardiac Rhythm Ventricular Paced. 3708: Alteplase administered to blue hub.     0600: Blue hub successful with blood return. 0730: Bedside shift change report given to Ventura Huerta RN (oncoming nurse) by Young Abbasi RN (offgoing nurse). Report included the following information SBAR, Kardex, Intake/Output, MAR, Recent Results, Med Rec Status and Cardiac Rhythm Ventricular Paced.

## 2021-05-18 NOTE — PROGRESS NOTES
Hospitalist Progress Note  Shayla Holley MD  Answering service: [phone] from in house phone      Date of Service:  2021  NAME:  Harrie Hamman  :  1950  MRN:  [id number]    Admission Summary:   70F p/w SOB. Interval history / Subjective:   Patient seen and examined at bedside, feels 'pretty well' ready to start HD. Noted scratch marks on her forearms. Will use topical creams. Assessment & Plan:     #. Sepsis: Source is not clear, only positive culture is for Candida in urine.   - ID following on Zosyn vancomycin and Eraxis    #. Acute on chronic respiratory failure :due to sepsis, chf and severe copd- resolved. - extubated. doing well on her home trilogy ventilator at night. #. Chronic RV failure  #. CAD: home regimen  #. Chronic sCHF: BiVentricular , s/p LVAD, s/p ICD. 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 H/o breast cancer () - s/p bilateral mastectomy/chemo   #. Lymphedema of LLE due to cancer treatment  #. COPD: no acute exacerbation. FEV1 50%  #. AFib: AC as above   #. ROCIO on CKD stage 4: progressive CKD and CRS- started HD   - Nephro following: R- IJ permacath in situ- OP HD set up at [place], [place]     #. Hyperkalemia, resolved  #. Pulmonary hypertension  #. Morbid obesity, BMI 42.29 kg/m². #. DM2: A1c 7.2, SSI, AccuChecks, monitor  #. JED on Trilogy  #. Urinary incontinence, severe  #. Endometrial cancer ()- s/p Hysterectomy  #. HTN: chronic, stable, Home regimen, PRN BP meds. Monitor  #.  HLD: chronic, Stable, Home regimen    Code status: Full  DVT prophylaxis: warfarin  Care Plan discussed with: Patient/Family and Nurse  Disposition: TBD 1-2days     Hospital Problems  Date Reviewed: 4/25/2021          Codes Class Noted POA    Severe sepsis with acute organ dysfunction [facility]) ICD-10-CM: A41.9, R65.20  ICD-9-CM: 038.9, 995.92 Acute 5/11/2021 Yes        * (Principal) Acute encephalopathy ICD-10-CM: G93.40  ICD-9-CM: 348.30  5/11/2021 Unknown            Review of Systems:   Pertinent items are mentioned in interval history. Vital Signs:    Last 24hrs VS reviewed since prior progress note. Most recent are:  Visit Vitals  /80   Pulse (!) 108   Temp 99 °F (37.2 °C)   Resp 24   Ht 5' 7\" (1.702 m)   Wt 108.3 kg (238 lb 12.1 oz)   SpO2 96%   BMI 37.39 kg/m²         Intake/Output Summary (Last 24 hours) at 5/18/2021 1223  Last data filed at 5/18/2021 1124  Gross per 24 hour   Intake 3180 ml   Output    Net 3180 ml        Physical Examination:   Evaluated face to face and examined 05/18/21    General:  Alert, oriented, No acute distress, chronically very sick looking, obese [employer]  Card:  humming VAD sound. good peripheral perfusion  Resp:  No accessory muscle use, fair AE, no wheezes. no crepitations  Abd:  Soft, non-tender, non-distended, BS+  Extremities:  No cyanosis or clubbing, significant edema signs. Venous insufficiency discoloration. Neuro:  Grossly normal, no focal neuro deficits, follows commands, speech wnl. Psych:  Good insight, not agitated.     Data Review:    Review and/or order of clinical lab test  Review and/or order of tests in the radiology section of CPT  Review and/or order of tests in the medicine section of CPT  Labs:     Recent Labs     05/18/21 0354 05/17/21 0432   WBC 3.7 3.7   HGB 9.0* 8.6*   HCT 30.0* 28.3*   * 127*     Recent Labs     05/18/21  0354 05/17/21 0432 05/16/21  0332   * 136 136   K 4.0 4.2 4.0    101 103   CO2 23 26 28   BUN 51* 41* 28*   CREA 5.74* 4.88* 3.63*   * 111* 119*   CA 8.7 8.7 9.1   MG 2.5* 2.4 2.5*   PHOS 5.0* 3.9 3.9 Recent Labs     05/18/21  0354 05/17/21  0432 05/16/21  0332   ALT 7* 9* 10*   AP 59 46 52   TBILI 0.7 0.8 0.7   TP 7.4 7.1 7.8   ALB 3.2* 3.1* 3.4*   GLOB 4.2* 4.0 4.4*     Recent Labs     05/18/21  0354 05/17/21  0432 05/16/21  0332   INR 4.2* 4.1* 3.1*   PTP 40.9* 40.6* 31.0*      No results for input(s): FE, TIBC, PSAT, FERR in the last 72 hours. Lab Results   Component Value Date/Time    Folate 10.0 10/28/2020 03:56 AM      No results for input(s): PH, PCO2, PO2 in the last 72 hours.   Recent Labs     05/16/21 0332   CPK 15*     Lab Results   Component Value Date/Time    Cholesterol, total 129 04/16/2021 10:40 AM    HDL Cholesterol 33 04/16/2021 10:40 AM    LDL, calculated 74 04/16/2021 10:40 AM    Triglyceride 110 04/16/2021 10:40 AM    CHOL/HDL Ratio 3.9 04/16/2021 10:40 AM     Lab Results   Component Value Date/Time    Glucose (POC) 170 (H) 05/18/2021 11:27 AM    Glucose (POC) 152 (H) 05/18/2021 07:14 AM    Glucose (POC) 144 (H) 05/17/2021 09:15 PM    Glucose (POC) 110 05/17/2021 04:35 PM    Glucose (POC) 124 (H) 05/17/2021 11:39 AM     Lab Results   Component Value Date/Time    Color DARK YELLOW 05/11/2021 03:57 AM    Appearance TURBID (A) 05/11/2021 03:57 AM    Specific gravity 1.029 05/11/2021 03:57 AM    Specific gravity 1.013 11/09/2020 12:05 AM    pH (UA) 5.0 05/11/2021 03:57 AM    Protein 100 (A) 05/11/2021 03:57 AM    Glucose Negative 05/11/2021 03:57 AM    Ketone TRACE (A) 05/11/2021 03:57 AM    Bilirubin Negative 04/18/2021 05:08 PM    Urobilinogen 0.2 05/11/2021 03:57 AM    Nitrites Positive (A) 05/11/2021 03:57 AM    Leukocyte Esterase LARGE (A) 05/11/2021 03:57 AM    Epithelial cells MODERATE (A) 05/11/2021 03:57 AM    Bacteria 1+ (A) 05/11/2021 03:57 AM    WBC >100 (H) 05/11/2021 03:57 AM    RBC 20-50 05/11/2021 03:57 AM     Medications Reviewed:     Current Facility-Administered Medications   Medication Dose Route Frequency    warfarin - HOLD on 5/18   Other ONCE    senna-docusate (PERICOLACE) 8.6-50 mg per tablet 1 Tab  1 Tab Oral DAILY    glucose chewable tablet 16 g  4 Tab Oral PRN    dextrose (D50W) injection syrg 12.5-25 g  25-50 mL IntraVENous PRN    glucagon (GLUCAGEN) injection 1 mg  1 mg IntraMUSCular PRN    insulin lispro (HUMALOG) injection   SubCUTAneous AC&HS    famotidine (PEPCID) tablet 20 mg  20 mg Oral DAILY    benzocaine-zinc cl-benzalkonium cl (ORAJEL) 20-0.1-0.02 % mucosal gel   Oral PRN    magic mouthwash cpd (without sucralfate)  5 mL Oral DAILY PRN    anidulafungin (ERAXIS) 100 mg in 0.9% sodium chloride 130 mL IVPB  100 mg IntraVENous Q24H    balsam peru-castor oiL (VENELEX) ointment   Topical BID    albumin human 25% (BUMINATE) solution 50 g  50 g IntraVENous DIALYSIS PRN    heparin (porcine) 1,000 unit/mL injection 1,900 Units  1,900 Units InterCATHeter DIALYSIS PRN    And    heparin (porcine) 1,000 unit/mL injection 1,900 Units  1,900 Units InterCATHeter DIALYSIS PRN    hydrALAZINE (APRESOLINE) 20 mg/mL injection 20 mg  20 mg IntraVENous Q6H PRN    hydrALAZINE (APRESOLINE) 20 mg/mL injection 10 mg  10 mg IntraVENous Q6H PRN    ranolazine ER (RANEXA) tablet 1,000 mg  1,000 mg Oral BID    hydrALAZINE (APRESOLINE) tablet 100 mg  100 mg Oral TID    isosorbide mononitrate ER (IMDUR) tablet 30 mg  30 mg Oral DAILY    tafamidis cap 61 mg (Patient Supplied)  61 mg Oral DAILY    ondansetron (ZOFRAN) injection 4 mg  4 mg IntraVENous Q4H PRN    epoetin amalia-epbx (RETACRIT) injection 20,000 Units  20,000 Units SubCUTAneous Q TUE, THU & SAT    zinc oxide-cod liver oil (DESITIN) 40 % paste   Topical Q12H    sodium chloride (NS) flush 5-10 mL  5-10 mL IntraVENous PRN    sodium chloride (NS) flush 5-40 mL  5-40 mL IntraVENous Q8H    sodium chloride (NS) flush 5-40 mL  5-40 mL IntraVENous PRN    acetaminophen (TYLENOL) tablet 650 mg  650 mg Oral Q6H PRN    Or    acetaminophen (TYLENOL) suppository 650 mg  650 mg Rectal Q6H PRN    albuterol-ipratropium (DUO-NEB) 2.5 MG-0.5 MG/3 ML  3 mL Nebulization Q6H PRN    Warfarin - Pharmacy to Dose   Other Rx Dosing/Monitoring    sodium chloride (NS) flush 5-40 mL  5-40 mL IntraVENous Q8H    sodium chloride (NS) flush 5-40 mL  5-40 mL IntraVENous PRN    nystatin (MYCOSTATIN) 100,000 unit/gram powder   Topical PRN   ______________________________________________________________________  EXPECTED LENGTH OF STAY: 4d 21h  ACTUAL LENGTH OF STAY:          Simone Mcneal MD

## 2021-05-18 NOTE — PROGRESS NOTES
0730:  Bedside shift change report given to Michelet Jaramillo RN (oncoming nurse) by Mary Ellen Barajas RN (offgoing nurse). Report included the following information SBAR, Kardex, Procedure Summary, Intake/Output, MAR, and Recent Results. 1203:  Dialysis at bedside. Patient returned to bed.    1600:  Dialysis complete, patient tearful and anxious during, discussed with daughter and brought to RNs attention that patient has been off paxil. Received orders from Dr. Lear Dancer.    1930:  Bedside shift change report given to Mary Ellen Barajas RN (oncoming nurse) by Michelet Jaramillo RN (offgoing nurse). Report included the following information SBAR, Kardex, Procedure Summary, Intake/Output, MAR and Recent Results. Problem: Pressure Injury - Risk of  Goal: *Prevention of pressure injury  Description: Document Jaime Scale and appropriate interventions in the flowsheet. Outcome: Progressing Towards Goal  Note: Pressure Injury Interventions:  Sensory Interventions: Assess need for specialty bed, Assess changes in LOC, Discuss PT/OT consult with provider    Moisture Interventions: Absorbent underpads, Check for incontinence Q2 hours and as needed, Limit adult briefs, Moisture barrier    Activity Interventions: Increase time out of bed    Mobility Interventions: HOB 30 degrees or less, Pressure redistribution bed/mattress (bed type)    Nutrition Interventions: Document food/fluid/supplement intake, Discuss nutritional consult with provider, Offer support with meals,snacks and hydration    Friction and Shear Interventions: Apply protective barrier, creams and emollients, Lift team/patient mobility team                Problem: Falls - Risk of  Goal: *Absence of Falls  Description: Document Godfrey Fall Risk and appropriate interventions in the flowsheet.   Outcome: Progressing Towards Goal  Note: Fall Risk Interventions:  Mobility Interventions: Bed/chair exit alarm, Patient to call before getting OOB    Mentation Interventions: Bed/chair exit alarm, Door open when patient unattended, Family/sitter at bedside    Medication Interventions: Teach patient to arise slowly, Utilize gait belt for transfers/ambulation, Bed/chair exit alarm    Elimination Interventions: Call light in reach, Patient to call for help with toileting needs, Toileting schedule/hourly rounds    History of Falls Interventions: Bed/chair exit alarm, Consult care management for discharge planning, Door open when patient unattended, Evaluate medications/consider consulting pharmacy, Room close to nurse's station, Utilize gait belt for transfer/ambulation

## 2021-05-18 NOTE — PROGRESS NOTES
ID Progress Note  2021    Subjective:     Feeling better overall    Objective:     Antibiotics:  1. Vancomycin   2. Eraxis   3. Zosyn       Vitals:   Visit Vitals  /80 Comment: treatment ended. Blood returned   Pulse (!) 108   Temp 98.3 °F (36.8 °C) (Oral)   Resp 24   Ht 5' 7\" (1.702 m)   Wt 108.3 kg (238 lb 12.1 oz)   SpO2 96%   BMI 37.39 kg/m²        Tmax:  Temp (24hrs), Av.5 °F (36.9 °C), Min:98 °F (36.7 °C), Max:99 °F (37.2 °C)      Exam:  Lungs:  clear to auscultation bilaterally  Heart:  regular rate and rhythm  Abdomen:  soft, non-tender. Bowel sounds normal. No masses,  no organomegaly    Labs:      Recent Labs     21  0354 21  0432 21  0332   WBC 3.7 3.7 4.1   HGB 9.0* 8.6* 8.8*   * 127* 134*   BUN 51* 41* 28*   CREA 5.74* 4.88* 3.63*   AP 59 46 52   TBILI 0.7 0.8 0.7       Cultures:     No results found for: SDES  Lab Results   Component Value Date/Time    Culture result: CANDIDA ALBICANS (A) 2021 11:29 AM    Culture result: RARE DIPHTHEROIDS (A) 2021 04:29 AM    Culture result: LIGHT YEAST, (APPARENT CANDIDA ALBICANS) (A) 2021 07:09 PM    Culture result: LIGHT NORMAL RESPIRATORY BETO 2021 07:09 PM       Radiology:     Line/Insert Date:           Assessment:     1. Chronic SWI  2. Candida UTI--can treat with fluconazole if able  3. LVAD infection     Objective:     1. Contraindications to fluconazole--will continue Eraxis  2.  Consider stopping other antibiotic therapy in face of negative cultures    Cristel Drake MD

## 2021-05-18 NOTE — PROGRESS NOTES
[facility]   [address] [address], [address], [facility]  Phone: [phone]   [phone]       Nephrology Progress Note  Geeta Lindsay     1950     [id number]  Date of Admission : 5/11/2021 05/18/21    CC: Follow up for ROCIO    Assessment and Plan   ROCIO on CKD:  - 2/2 progressive CKD and CRS  - dialysis initiated 4/26/21. Has RI permacath for access. - HD later today  - Out pt dialysis to be set up at [facility] [place] [facility]     Sepsis :  UTI :  + for candida  Blood cx from Permacath : negative so far      Acute Hypoxic Resp failure :  - baseline severe COPD + chronic CHF + morbid obesity/ JED   - Pulm HTN   - extubated 5/13     Chronic systolic CHF, stage C NYHA class IV  Combined ischemic and nonischemic cardiomyopathy  S/p HM 2 LVAD implant 4/15/2017 by Dr. Whit Boogie at [facility]  hx of recurrent VT : off mexiletine  chronic RV failure. 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 - per AHF team      Chronic anemia :   ATTR amyloidosis  - anemia of chronic disease   - continue HEIKE  MWF     Chronic A. fib. History of endometrial Ca       Interval History:  Seen and examined. Confusion seems better this AM.  No cp, sob reported. For HD later today. Review of Systems: Pertinent items are noted in HPI.     Current Medications:   Current Facility-Administered Medications   Medication Dose Route Frequency    warfarin - HOLD on 5/18   Other ONCE    senna-docusate (PERICOLACE) 8.6-50 mg per tablet 1 Tab  1 Tab Oral DAILY    glucose chewable tablet 16 g  4 Tab Oral PRN    dextrose (D50W) injection syrg 12.5-25 g  25-50 mL IntraVENous PRN    glucagon (GLUCAGEN) injection 1 mg  1 mg IntraMUSCular PRN    insulin lispro (HUMALOG) injection   SubCUTAneous AC&HS    famotidine (PEPCID) tablet 20 mg  20 mg Oral DAILY    benzocaine-zinc cl-benzalkonium cl (ORAJEL) 20-0.1-0.02 % mucosal gel   Oral PRN    magic mouthwash cpd (without sucralfate)  5 mL Oral DAILY PRN    anidulafungin (ERAXIS) 100 mg in 0.9% sodium chloride 130 mL IVPB  100 mg IntraVENous Q24H    balsam peru-castor oiL (VENELEX) ointment   Topical BID    albumin human 25% (BUMINATE) solution 50 g  50 g IntraVENous DIALYSIS PRN    heparin (porcine) 1,000 unit/mL injection 1,900 Units  1,900 Units InterCATHeter DIALYSIS PRN    And    heparin (porcine) 1,000 unit/mL injection 1,900 Units  1,900 Units InterCATHeter DIALYSIS PRN    hydrALAZINE (APRESOLINE) 20 mg/mL injection 20 mg  20 mg IntraVENous Q6H PRN    hydrALAZINE (APRESOLINE) 20 mg/mL injection 10 mg  10 mg IntraVENous Q6H PRN    ranolazine ER (RANEXA) tablet 1,000 mg  1,000 mg Oral BID    hydrALAZINE (APRESOLINE) tablet 100 mg  100 mg Oral TID    isosorbide mononitrate ER (IMDUR) tablet 30 mg  30 mg Oral DAILY    tafamidis cap 61 mg (Patient Supplied)  61 mg Oral DAILY    ondansetron (ZOFRAN) injection 4 mg  4 mg IntraVENous Q4H PRN    epoetin amalia-epbx (RETACRIT) injection 20,000 Units  20,000 Units SubCUTAneous Q TUE, THU & SAT    piperacillin-tazobactam (ZOSYN) 3.375 g in 0.9% sodium chloride (MBP/ADV) 100 mL MBP  3.375 g IntraVENous Q12H    Vancomycin Pharmacy Dosing   Other PRN    zinc oxide-cod liver oil (DESITIN) 40 % paste   Topical Q12H    sodium chloride (NS) flush 5-10 mL  5-10 mL IntraVENous PRN    sodium chloride (NS) flush 5-40 mL  5-40 mL IntraVENous Q8H    sodium chloride (NS) flush 5-40 mL  5-40 mL IntraVENous PRN    acetaminophen (TYLENOL) tablet 650 mg  650 mg Oral Q6H PRN    Or    acetaminophen (TYLENOL) suppository 650 mg  650 mg Rectal Q6H PRN    albuterol-ipratropium (DUO-NEB) 2.5 MG-0.5 MG/3 ML  3 mL Nebulization Q6H PRN    Warfarin - Pharmacy to Dose   Other Rx Dosing/Monitoring    sodium chloride (NS) flush 5-40 mL  5-40 mL IntraVENous Q8H    sodium chloride (NS) flush 5-40 mL  5-40 mL IntraVENous PRN    nystatin (MYCOSTATIN) 100,000 unit/gram powder   Topical PRN      Allergies   Allergen Reactions    Benzodiazepines Other (comments)     Respiratory issues       Objective:  Vitals:    Vitals:    05/18/21 0000 05/18/21 0343 05/18/21 0817 05/18/21 0822   Pulse: 88 92 96    Resp: 18 20 20    Temp: 98.6 °F (37 °C) 98.4 °F (36.9 °C) 99 °F (37.2 °C)    TempSrc:       SpO2: 95% 95% 95%    Weight:    108.3 kg (238 lb 12.1 oz)   Height:         Intake and Output:  05/18 0701 - 05/18 1900  In: 100 [I.V.:100]  Out: -   05/16 1901 - 05/18 0700  In: 7798 [P.O.:2900; I.V.:560]  Out: 0     Physical Examination:    General: Obese   HEENT            NAD, pleasant  Neck:  permacath +  Resp:  Lungs clear B/L, no wheezing   CV:  RRR,  S1,S2  GI:  Soft, NT, + BS  Neurologic:  AAO X 3   Psych:             Normal affect and mood       []    High complexity decision making was performed  []    Patient is at high-risk of decompensation with multiple organ involvement    Lab Data Personally Reviewed: I have reviewed all the pertinent labs, microbiology data and radiology studies during assessment.     Recent Labs     05/18/21 0354 05/17/21 0432 05/16/21 0332   * 136 136   K 4.0 4.2 4.0    101 103   CO2 23 26 28   * 111* 119*   BUN 51* 41* 28*   CREA 5.74* 4.88* 3.63*   CA 8.7 8.7 9.1   MG 2.5* 2.4 2.5*   PHOS 5.0* 3.9 3.9   ALB 3.2* 3.1* 3.4*   ALT 7* 9* 10*   INR 4.2* 4.1* 3.1*     Recent Labs     05/18/21 0354 05/17/21 0432 05/16/21 0332   WBC 3.7 3.7 4.1   HGB 9.0* 8.6* 8.8*   HCT 30.0* 28.3* 29.9*   * 127* 134*     No results found for: SDES  Lab Results   Component Value Date/Time    Culture result: CANDIDA ALBICANS (A) 05/13/2021 11:29 AM    Culture result: RARE DIPHTHEROIDS (A) 05/12/2021 04:29 AM    Culture result: LIGHT YEAST, (APPARENT CANDIDA ALBICANS) (A) 05/11/2021 07:09 PM    Culture result: LIGHT NORMAL RESPIRATORY BETO 05/11/2021 07:09 PM    Culture result: NO GROWTH 5 DAYS 05/11/2021 04:30 PM     Recent Results (from the past 24 hour(s))   POC EG7    Collection Time: 05/17/21 11:03 AM Result Value Ref Range    Calcium, ionized (POC) 1.17 1.12 - 1.32 mmol/L    pH (POC) 7.41 7.35 - 7.45      pCO2 (POC) 37.5 35.0 - 45.0 MMHG    pO2 (POC) 64 (L) 80 - 100 MMHG    HCO3 (POC) 23.9 22 - 26 MMOL/L    Base deficit (POC) 1 mmol/L    sO2 (POC) 92 92 - 97 %    Site RIGHT RADIAL      Device: ROOM AIR      Allens test (POC) YES      Specimen type (POC) ARTERIAL     GLUCOSE, POC    Collection Time: 05/17/21 11:39 AM   Result Value Ref Range    Glucose (POC) 124 (H) 65 - 117 mg/dL    [facility]    GLUCOSE, POC    Collection Time: 05/17/21  4:35 PM   Result Value Ref Range    Glucose (POC) 110 65 - 117 mg/dL    Performed by SHA GARDNER    GLUCOSE, POC    Collection Time: 05/17/21  9:15 PM   Result Value Ref Range    Glucose (POC) 144 (H) 65 - 117 mg/dL    Performed by Tania 110    Collection Time: 05/18/21  3:54 AM   Result Value Ref Range    Magnesium 2.5 (H) 1.6 - 2.4 mg/dL   PHOSPHORUS    Collection Time: 05/18/21  3:54 AM   Result Value Ref Range    Phosphorus 5.0 (H) 2.6 - 4.7 MG/DL   PROTHROMBIN TIME + INR    Collection Time: 05/18/21  3:54 AM   Result Value Ref Range    INR 4.2 (H) 0.9 - 1.1      Prothrombin time 40.9 (H) 9.0 - 11.1 sec   PROCALCITONIN    Collection Time: 05/18/21  3:54 AM   Result Value Ref Range    Procalcitonin 1.55 ng/mL   LD    Collection Time: 05/18/21  3:54 AM   Result Value Ref Range     81 - 246 U/L   CBC WITH AUTOMATED DIFF    Collection Time: 05/18/21  3:54 AM   Result Value Ref Range    WBC 3.7 3.6 - 11.0 K/uL    RBC 3.29 (L) 3.80 - 5.20 M/uL    HGB 9.0 (L) 11.5 - 16.0 g/dL    HCT 30.0 (L) 35.0 - 47.0 %    MCV 91.2 80.0 - 99.0 FL    MCH 27.4 26.0 - 34.0 PG    MCHC 30.0 30.0 - 36.5 g/dL    RDW 19.9 (H) 11.5 - 14.5 %    PLATELET 336 (L) 797 - 400 K/uL    MPV 9.3 8.9 - 12.9 FL    NRBC 0.0 0  WBC    ABSOLUTE NRBC 0.00 0.00 - 0.01 K/uL    NEUTROPHILS 82 (H) 32 - 75 %    LYMPHOCYTES 5 (L) 12 - 49 %    MONOCYTES 12 5 - 13 % EOSINOPHILS 0 0 - 7 %    BASOPHILS 0 0 - 1 %    METAMYELOCYTES 1 (H) 0 %    IMMATURE GRANULOCYTES 0 %    ABS. NEUTROPHILS 3.0 1.8 - 8.0 K/UL    ABS. LYMPHOCYTES 0.2 (L) 0.8 - 3.5 K/UL    ABS. MONOCYTES 0.4 0.0 - 1.0 K/UL    ABS. EOSINOPHILS 0.0 0.0 - 0.4 K/UL    ABS. BASOPHILS 0.0 0.0 - 0.1 K/UL    ABS. IMM. GRANS. 0.0 K/UL    DF MANUAL      RBC COMMENTS ANISOCYTOSIS  1+        RBC COMMENTS POLYCHROMASIA  1+       METABOLIC PANEL, COMPREHENSIVE    Collection Time: 05/18/21  3:54 AM   Result Value Ref Range    Sodium 135 (L) 136 - 145 mmol/L    Potassium 4.0 3.5 - 5.1 mmol/L    Chloride 101 97 - 108 mmol/L    CO2 23 21 - 32 mmol/L    Anion gap 11 5 - 15 mmol/L    Glucose 130 (H) 65 - 100 mg/dL    BUN 51 (H) 6 - 20 MG/DL    Creatinine 5.74 (H) 0.55 - 1.02 MG/DL    BUN/Creatinine ratio 9 (L) 12 - 20      GFR est AA 9 (L) >60 ml/min/1.73m2    GFR est non-AA 7 (L) >60 ml/min/1.73m2    Calcium 8.7 8.5 - 10.1 MG/DL    Bilirubin, total 0.7 0.2 - 1.0 MG/DL    ALT (SGPT) 7 (L) 12 - 78 U/L    AST (SGOT) 19 15 - 37 U/L    Alk. phosphatase 59 45 - 117 U/L    Protein, total 7.4 6.4 - 8.2 g/dL    Albumin 3.2 (L) 3.5 - 5.0 g/dL    Globulin 4.2 (H) 2.0 - 4.0 g/dL    A-G Ratio 0.8 (L) 1.1 - 2.2     VANCOMYCIN, RANDOM    Collection Time: 05/18/21  3:54 AM   Result Value Ref Range    Vancomycin, random 18.0 UG/ML   GLUCOSE, POC    Collection Time: 05/18/21  7:14 AM   Result Value Ref Range    Glucose (POC) 152 (H) 65 - 117 mg/dL    Performed by Henna Brito            Total time spent with patient:  xxx   min. Care Plan discussed with:  Patient     Family      RN      Consulting Physician [id number] [facility]        I have reviewed the flowsheets. Chart and Pertinent Notes have been reviewed. No change in PMH ,family and social history from Consult note.       Gena Loera MD

## 2021-05-18 NOTE — PROGRESS NOTES
0730: Bedside and Verbal shift change report given to Marc Huber (oncoming nurse) by Michael Chin (offgoing nurse). Report included the following information SBAR, Kardex, ED Summary, Procedure Summary, Intake/Output, MAR, Recent Results and Cardiac Rhythm V Paced. Problem: Pressure Injury - Risk of  Goal: *Prevention of pressure injury  Description: Document Jaime Scale and appropriate interventions in the flowsheet. Outcome: Progressing Towards Goal  Note: Pressure Injury Interventions:  Sensory Interventions: Assess changes in LOC, Discuss PT/OT consult with provider, Float heels, Minimize linen layers    Moisture Interventions: Absorbent underpads, Check for incontinence Q2 hours and as needed, Limit adult briefs, Moisture barrier    Activity Interventions: Increase time out of bed, Pressure redistribution bed/mattress(bed type), PT/OT evaluation, Chair cushion    Mobility Interventions: Pressure redistribution bed/mattress (bed type), PT/OT evaluation, Turn and reposition approx. every two hours(pillow and wedges), Chair cushion    Nutrition Interventions: Document food/fluid/supplement intake    Friction and Shear Interventions: Apply protective barrier, creams and emollients, Lift team/patient mobility team            Problem: Falls - Risk of  Goal: *Absence of Falls  Description: Document Carroll Maillard Fall Risk and appropriate interventions in the flowsheet.   Note: Fall Risk Interventions:  Mobility Interventions: Bed/chair exit alarm, OT consult for ADLs, Patient to call before getting OOB, PT Consult for mobility concerns, Strengthening exercises (ROM-active/passive), Utilize walker, cane, or other assistive device    Mentation Interventions: Bed/chair exit alarm, Familiar objects from home, Family/sitter at bedside, Reorient patient, Room close to nurse's station    Medication Interventions: Bed/chair exit alarm, Evaluate medications/consider consulting pharmacy, Patient to call before getting OOB, Utilize gait belt for transfers/ambulation    Elimination Interventions: Call light in reach, Patient to call for help with toileting needs, Urinal in reach    History of Falls Interventions: Bed/chair exit alarm, Consult care management for discharge planning, Door open when patient unattended, Evaluate medications/consider consulting pharmacy, Room close to nurse's station, Utilize gait belt for transfer/ambulation    Call bell and personal effects within reach. Bed locked and in low position. Non skid footwear in place.        Problem: Breathing Pattern - Ineffective  Goal: *Absence of hypoxia  Outcome: Progressing Towards Goal  Goal: *Use of effective breathing techniques  Outcome: Progressing Towards Goal

## 2021-05-18 NOTE — PROGRESS NOTES
[facility] VAD/Heart Failure Progress Note    Admit Date: 2021  POD:  * No surgery found *    Procedure:          Subjective:   Dyspnea continues to improve; off oxygen; abx's; good flows      Objective:   Vitals:  Blood pressure 100/80, pulse 96, temperature 99 °F (37.2 °C), resp. rate 20, height 5' 7\" (1.702 m), weight 238 lb 12.1 oz (108.3 kg), SpO2 95 %.   Temp (24hrs), Av.6 °F (37 °C), Min:98 °F (36.7 °C), Max:99 °F (37.2 °C)    Hemodynamics:   CO:     CI:     CVP: CVP (mmHg): 10 mmHg (21 0700)   SVR:     PAP Systolic:     PAP Diastolic:     PVR:     YY33:     SCV02:      VAD Interrogation: LVAD (Heartmate)  Pump Speed (RPM): 9400  Pump Flow (LPM): 5.1  Chatter in Lines: No  PI (Pulsitility Index): 5.8  Power: 5.8  MAP: 84   Test: Yes  Back Up  at Bedside & Labeled: Yes  Power Module Test: Yes  Driveline Site Care: No  Driveline Dressing: Clean, Dry, and Intact    EKG/Rhythm:      Extubation Date / Time:     CT Output:     Ventilator:  Ventilator Volumes  Vt Set (ml): 450 ml (21 0750)  Vt Exhaled (Machine Breath) (ml): 473 ml (21 0750)  Vt Spont (ml): 465 ml (21 0133)  Ve Observed (l/min): 10.1 l/min (21 0133)    Oxygen Therapy:  Oxygen Therapy  O2 Sat (%): 95 % (21 0817)  Pulse via Oximetry: 89 beats per minute (21 0351)  O2 Device: None (Room air) (21 0817)  Skin Assessment: Clean, dry, & intact (21 2100)  Skin Protection for O2 Device: No (21 0800)  O2 Flow Rate (L/min): 1 l/min (21 2349)  O2 Temperature: (HME) (21 0328)  FIO2 (%): 40 % (21 1200)  ETCO2 (mmHg): 36 mmHg (21 0200)    CXR:    Admission Weight: Last Weight   Weight: 269 lb 10 oz (122.3 kg) Weight: 238 lb 12.1 oz (108.3 kg)     Intake / Output / Drain:  Current Shift:  0701 -  190  In: 280 [P.O.:180; I.V.:100]  Out: -   Last 24 hrs.:     Intake/Output Summary (Last 24 hours) at 2021 [address] filed at 5/18/2021 0817  Gross per 24 hour   Intake 3000 ml   Output    Net 3000 ml           Recent Labs     05/16/21  0332   CPK 15*     Recent Labs     05/18/21 0354 05/17/21 0432 05/16/21 0332   * 136 136   K 4.0 4.2 4.0   CO2 23 26 28   BUN 51* 41* 28*   CREA 5.74* 4.88* 3.63*   * 111* 119*   PHOS 5.0* 3.9 3.9   MG 2.5* 2.4 2.5*   WBC 3.7 3.7 4.1   HGB 9.0* 8.6* 8.8*   HCT 30.0* 28.3* 29.9*   * 127* 134*     Recent Labs     05/18/21 0354 05/17/21 0432 05/16/21 0332   INR 4.2* 4.1* 3.1*   PTP 40.9* 40.6* 31.0*     No lab exists for component: PBNP        Current Facility-Administered Medications:     warfarin - HOLD on 5/18, , Other, ONCE, Triny Limon NP    senna-docusate (PERICOLACE) 8.6-50 mg per tablet 1 Tab, 1 Tab, Oral, DAILY, Triny Limon NP, 1 Tab at 05/17/21 1237    glucose chewable tablet 16 g, 4 Tab, Oral, PRN, Elda Pineda MD    dextrose (D50W) injection syrg 12.5-25 g, 25-50 mL, IntraVENous, PRN, Elda Pineda MD    glucagon [facility]) injection 1 mg, 1 mg, IntraMUSCular, PRN, Elda Pineda MD    insulin lispro (HUMALOG) injection, , SubCUTAneous, AC&HS, Benito Kwan MD, 2 Units at 05/18/21 0724    famotidine (PEPCID) tablet 20 mg, 20 mg, Oral, DAILY, Beth Aj MD, 20 mg at 05/18/21 2492    benzocaine-zinc cl-benzalkonium cl (ORAJEL) 20-0.1-0.02 % mucosal gel, , Oral, PRN, Beth Aj MD    magic mouthwash cpd (without sucralfate), 5 mL, Oral, DAILY PRN, Beth Aj MD    anidulafungin (ERAXIS) 100 mg in 0.9% sodium chloride 130 mL IVPB, 100 mg, IntraVENous, Q24H, Michellfam Turner MD, 100 mg at 05/17/21 1808    balsam peru-castor oiL (VENELEX) ointment, , Topical, BID, Beth Aj MD, Given at 05/18/21 4704    albumin human 25% (BUMINATE) solution 50 g, 50 g, IntraVENous, DIALYSIS PRN, Jaspreet Mendoza MD    heparin (porcine) 1,000 unit/mL injection 1,900 Units, 1,900 Units, InterCATHeter, DIALYSIS PRN, 1,900 Units at 05/15/21 1115 **AND** heparin (porcine) 1,000 unit/mL injection 1,900 Units, 1,900 Units, InterCATHeter, DIALYSIS PRN, Jaspreet Mendoza MD, 1,900 Units at 05/15/21 1115    hydrALAZINE (APRESOLINE) 20 mg/mL injection 20 mg, 20 mg, IntraVENous, Q6H PRN, Braxton, Triny B, NP    hydrALAZINE (APRESOLINE) 20 mg/mL injection 10 mg, 10 mg, IntraVENous, Q6H PRN, Braxton, Triny B, NP, 10 mg at 05/13/21 1052    ranolazine ER (RANEXA) tablet 1,000 mg, 1,000 mg, Oral, BID, Braxton, Triny B, NP, 1,000 mg at 05/18/21 0838    hydrALAZINE (APRESOLINE) tablet 100 mg, 100 mg, Oral, TID, Braxton, Triny B, NP, 100 mg at 05/18/21 6734    isosorbide mononitrate ER (IMDUR) tablet 30 mg, 30 mg, Oral, DAILY, Braxton, Triny B, NP, 30 mg at 05/18/21 0838    tafamidis cap 61 mg (Patient Supplied), 61 mg, Oral, DAILY, Jennifer Wu MD, 61 mg at 05/18/21 0838    ondansetron (ZOFRAN) injection 4 mg, 4 mg, IntraVENous, Q4H PRN, Valeriy Morrow MD    epoetin amalia-epbx (RETACRIT) injection 20,000 Units, 20,000 Units, SubCUTAneous, Q TUE, THU & SAT, Rui Mercado MD, 20,000 Units at 05/15/21 2149    piperacillin-tazobactam (ZOSYN) 3.375 g in 0.9% sodium chloride (MBP/ADV) 100 mL MBP, 3.375 g, IntraVENous, Q12H, Darcy MCGREGOR MD, Last Rate: 25 mL/hr at 05/18/21 0724, 3.375 g at 05/18/21 0724    Vancomycin Pharmacy Dosing, , Other, PRN, Darcy Desir MD    zinc oxide-cod liver oil (DESITIN) 40 % paste, , Topical, Q12H, Darcy MCGREGOR MD, Given at 05/18/21 0839    sodium chloride (NS) flush 5-10 mL, 5-10 mL, IntraVENous, PRN, Agustin Hernandez MD    sodium chloride (NS) flush 5-40 mL, 5-40 mL, IntraVENous, Q8H, Sonia Palacio MD, 30 mL at 05/18/21 0716    sodium chloride (NS) flush 5-40 mL, 5-40 mL, IntraVENous, PRN, Sonia Palacio MD    acetaminophen (TYLENOL) tablet 650 mg, 650 mg, Oral, Q6H PRN **OR** acetaminophen (TYLENOL) suppository 650 mg, 650 mg, Rectal, Q6H PRN, Sonia Palacio MD    albuterol-ipratropium (DUO-NEB) 2.5 MG-0.5 MG/3 ML, 3 mL, Nebulization, Q6H PRN, An Rowland MD    Warfarin - Pharmacy to Dose, , Other, Rx Dosing/Monitoring, Vani Walter, NP    sodium chloride (NS) flush 5-40 mL, 5-40 mL, IntraVENous, Q8H, Braxton, Triny B, NP, 30 mL at 05/18/21 0716    sodium chloride (NS) flush 5-40 mL, 5-40 mL, IntraVENous, PRN, Braxton, Triny B, NP    nystatin (MYCOSTATIN) 100,000 unit/gram powder, , Topical, PRN, Bessie Gallego MD    A/P  S/P LVAD - good flows  Renal failure - HD  Urosepsis - Abx's  Need for anticoagulation - coumadin      Risk of morbidity and mortality - high  Medical decision making - high complexity    Signed By: Minerva Núñez MD

## 2021-05-18 NOTE — CONSULTS
Palliative Medicine Consult  Charlie: [phone] ([phone]    Patient Name: Estella Oneill  YOB: 1950    Date of Initial Consult: May 12, 2021  Reason for Consult: Care decisions  Requesting Provider: aRy Gallegos  Primary Care Physician: Ian Montes NP     SUMMARY:   Estella Oneill is a 78 y.o. with a past history of heart failure, CAD, AICD, nonischemic cardiomyopathy, LVAD, ESRD started dialysis 4/26, COPD (trilogy), morbid obesity, DM, breast cancer, who was admitted on 5/11/2021 from rehab with a diagnosis of acute metabolic encephalopathy, acute on chronic heart failure. Current medical issues leading to Palliative Medicine involvement include: Support with care decisions. Patient acutely ill with multiple morbidities and high risk of compromise. Social: , one daughter, lives at home alone, daughter lives 2 hours away,  Worked at [employer] before leaving to care for her ill . PALLIATIVE DIAGNOSES:   1. Shortness of breath  2. Hypoalbuminemia  3. Physical debility  4. Goals of care      PLAN:   Patient seen during dialysis.   She is tolerating treatment well, alert, and engaging  Goals are to continue current level of care  Chart notes indicate daughter trying to move the patient closer to her in [address]  No additional palliative needs identified at this time  We will sign off       GOALS OF CARE / TREATMENT PREFERENCES:     GOALS OF CARE:  Patient/Health Care Proxy Stated Goals: Rehabilitation    TREATMENT PREFERENCES:   Code Status: Full Code    Advance Care Planning:  [] [facility] Interdisciplinary Team has updated the ACP Navigator with [address] and Patient Capacity      Primary Decision Maker: Art Rowe - Daughter - [phone]  Advance Care Planning 5/11/2021   Patient's Healthcare Decision Maker is: -   Confirm Advance Directive None   Patient Would Like to Complete Advance Directive -       Medical Interventions: Full interventions     Other Instructions: Other:    As far as possible, the palliative care team has discussed with patient / health care proxy about goals of care / treatment preferences for patient. HISTORY:     History obtained from: Chart, team, family    CHIEF COMPLAINT: Patient admitted with  forementioned history and issues    HPI/SUBJECTIVE:    The patient is:   [] Verbal and participatory  [x] Non-participatory due to:   Medical condition    Clinical Pain Assessment (nonverbal scale for severity on nonverbal patients):   Clinical Pain Assessment  Severity: 0     Activity (Movement): Lying quietly, normal position    Duration: for how long has pt been experiencing pain (e.g., 2 days, 1 month, years)  Frequency: how often pain is an issue (e.g., several times per day, once every few days, constant)     FUNCTIONAL ASSESSMENT:     Palliative Performance Scale (PPS):  PPS: 50       PSYCHOSOCIAL/SPIRITUAL SCREENING:     Palliative IDT has assessed this patient for cultural preferences / practices and a referral made as appropriate to needs (Cultural Services, Patient Advocacy, Ethics, etc.)    Any spiritual / Worship concerns:  [] Yes /  [x] No    Caregiver Burnout:  [] Yes /  [x] No /  [] No Caregiver Present      Anticipatory grief assessment:   [x] Normal  / [] Maladaptive       ESAS Anxiety: Anxiety: 0    ESAS Depression:          REVIEW OF SYSTEMS:     Positive and pertinent negative findings in ROS are noted above in HPI. The following systems were [] reviewed / [x] unable to be reviewed as noted in HPI  Other findings are noted below. Systems: constitutional, ears/nose/mouth/throat, respiratory, gastrointestinal, genitourinary, musculoskeletal, integumentary, neurologic, psychiatric, endocrine. Positive findings noted below.   Modified ESAS Completed by: provider   Fatigue: 10 Drowsiness: 8     Pain: 0   Anxiety: 0     Anorexia: 10 Dyspnea: 0           Stool Occurrence(s): 1        PHYSICAL EXAM:     From RN flowsheet:  Wt Readings from Last 3 Encounters:   05/18/21 238 lb 12.1 oz (108.3 kg)   05/06/21 246 lb 11.1 oz (111.9 kg)   04/15/21 271 lb (122.9 kg)     Blood pressure 100/80, pulse (!) 108, temperature 98.3 °F (36.8 °C), temperature source Oral, resp. rate 24, height 5' 7\" (1.702 m), weight 238 lb 12.1 oz (108.3 kg), SpO2 96 %. Pain Scale 1: Numeric (0 - 10)  Pain Intensity 1: 0  Pain Onset 1: acute  Pain Location 1: Shoulder  Pain Orientation 1: Right  Pain Description 1: Aching  Pain Intervention(s) 1: Declines  Last bowel movement, if known:     Constitutional: Alert, NAD, conversant  Eyes: Anicteric  ENMT: no nasal discharge, moist mucous membranes  Cardiovascular: regular rhythm  Respiratory: breathing not labored, symmetric  Gastrointestinal: Gross, soft non-tender, +bowel sounds  Musculoskeletal: no deformity, no tenderness to palpation  Skin: Warm dry  Neurologic: Nonfocal  Psychiatric: Calm, pleasant  Other:       HISTORY:     Principal Problem:    Acute encephalopathy (5/11/2021)    Active Problems:    Severe sepsis with acute organ dysfunction ([address] (5/11/2021)      Past Medical History:   Diagnosis Date    Asthma     Cancer ([facility]     breast    Cancer ([facility]     endometrial    Congestive heart failure, unspecified     CRI (chronic renal insufficiency)     Depression     Diabetes ([address]     Hypertension     Severe sepsis with acute organ dysfunction ([facility] 5/11/2021      Past Surgical History:   Procedure Laterality Date    HX HERNIA REPAIR      HX HYSTERECTOMY      HX MASTECTOMY      IR INSERT NON TUNL CVC OVER 5 YRS  4/26/2021    IR INSERT TUNL CVC W/O PORT OVER 5 YR  5/5/2021      No family history on file. History reviewed, no pertinent family history.   Social History     Tobacco Use    Smoking status: Never Smoker    Smokeless tobacco: Never Used   Substance Use Topics    Alcohol use: Not Currently     Allergies   Allergen Reactions    Benzodiazepines Other (comments)     Respiratory issues      Current Facility-Administered Medications   Medication Dose Route Frequency    warfarin - HOLD on 5/18   Other ONCE    PARoxetine (PAXIL) tablet 20 mg  20 mg Oral DAILY    senna-docusate (PERICOLACE) 8.6-50 mg per tablet 1 Tab  1 Tab Oral DAILY    glucose chewable tablet 16 g  4 Tab Oral PRN    dextrose (D50W) injection syrg 12.5-25 g  25-50 mL IntraVENous PRN    glucagon (GLUCAGEN) injection 1 mg  1 mg IntraMUSCular PRN    insulin lispro (HUMALOG) injection   SubCUTAneous AC&HS    famotidine (PEPCID) tablet 20 mg  20 mg Oral DAILY    benzocaine-zinc cl-benzalkonium cl (ORAJEL) 20-0.1-0.02 % mucosal gel   Oral PRN    magic mouthwash cpd (without sucralfate)  5 mL Oral DAILY PRN    anidulafungin (ERAXIS) 100 mg in 0.9% sodium chloride 130 mL IVPB  100 mg IntraVENous Q24H    balsam peru-castor oiL (VENELEX) ointment   Topical BID    albumin human 25% (BUMINATE) solution 50 g  50 g IntraVENous DIALYSIS PRN    heparin (porcine) 1,000 unit/mL injection 1,900 Units  1,900 Units InterCATHeter DIALYSIS PRN    And    heparin (porcine) 1,000 unit/mL injection 1,900 Units  1,900 Units InterCATHeter DIALYSIS PRN    hydrALAZINE (APRESOLINE) 20 mg/mL injection 20 mg  20 mg IntraVENous Q6H PRN    hydrALAZINE (APRESOLINE) 20 mg/mL injection 10 mg  10 mg IntraVENous Q6H PRN    ranolazine ER (RANEXA) tablet 1,000 mg  1,000 mg Oral BID    hydrALAZINE (APRESOLINE) tablet 100 mg  100 mg Oral TID    isosorbide mononitrate ER (IMDUR) tablet 30 mg  30 mg Oral DAILY    tafamidis cap 61 mg (Patient Supplied)  61 mg Oral DAILY    ondansetron (ZOFRAN) injection 4 mg  4 mg IntraVENous Q4H PRN    epoetin amalia-epbx (RETACRIT) injection 20,000 Units  20,000 Units SubCUTAneous Q TUE, THU & SAT    zinc oxide-cod liver oil (DESITIN) 40 % paste   Topical Q12H    sodium chloride (NS) flush 5-10 mL  5-10 mL IntraVENous PRN    sodium chloride (NS) flush 5-40 mL  5-40 mL IntraVENous Q8H    sodium chloride (NS) flush 5-40 mL  5-40 mL IntraVENous PRN    acetaminophen (TYLENOL) tablet 650 mg  650 mg Oral Q6H PRN    Or    acetaminophen (TYLENOL) suppository 650 mg  650 mg Rectal Q6H PRN    albuterol-ipratropium (DUO-NEB) 2.5 MG-0.5 MG/3 ML  3 mL Nebulization Q6H PRN    Warfarin - Pharmacy to Dose   Other Rx Dosing/Monitoring    sodium chloride (NS) flush 5-40 mL  5-40 mL IntraVENous Q8H    sodium chloride (NS) flush 5-40 mL  5-40 mL IntraVENous PRN    nystatin (MYCOSTATIN) 100,000 unit/gram powder   Topical PRN          LAB AND IMAGING FINDINGS:     Lab Results   Component Value Date/Time    WBC 3.7 05/18/2021 03:54 AM    HGB 9.0 (L) 05/18/2021 03:54 AM    PLATELET 074 (L) 89/74/6214 03:54 AM     Lab Results   Component Value Date/Time    Sodium 135 (L) 05/18/2021 03:54 AM    Potassium 4.0 05/18/2021 03:54 AM    Chloride 101 05/18/2021 03:54 AM    CO2 23 05/18/2021 03:54 AM    BUN 51 (H) 05/18/2021 03:54 AM    Creatinine 5.74 (H) 05/18/2021 03:54 AM    Calcium 8.7 05/18/2021 03:54 AM    Magnesium 2.5 (H) 05/18/2021 03:54 AM    Phosphorus 5.0 (H) 05/18/2021 03:54 AM      Lab Results   Component Value Date/Time    Alk.  phosphatase 59 05/18/2021 03:54 AM    Protein, total 7.4 05/18/2021 03:54 AM    Albumin 3.2 (L) 05/18/2021 03:54 AM    Globulin 4.2 (H) 05/18/2021 03:54 AM     Lab Results   Component Value Date/Time    INR 4.2 (H) 05/18/2021 03:54 AM    Prothrombin time 40.9 (H) 05/18/2021 03:54 AM    aPTT 76.7 (H) 05/15/2021 03:14 AM      Lab Results   Component Value Date/Time    Iron 22 (L) 04/27/2021 05:17 AM    TIBC 213 (L) 04/27/2021 05:17 AM    Iron % saturation 10 (L) 04/27/2021 05:17 AM    Ferritin 150 04/27/2021 05:17 AM      Lab Results   Component Value Date/Time    pH 7.22 (LL) 05/11/2021 03:44 AM    PCO2 56 (H) 05/11/2021 03:44 AM    PO2 297 (H) 05/11/2021 03:44 AM     No components found for: [place]   Lab Results   Component Value Date/Time    CK 15 (L) 05/16/2021 03:32 AM Total time: 25 minutes  Counseling / coordination time, spent as noted above: 20 minutes  > 50% counseling / coordination?:  Yes    Prolonged service was provided for  []30 min   []75 min in face to face time in the presence of the patient, spent as noted above. Time Start:   Time End:   Note: this can only be billed with 26290 (initial) or 50763 (follow up). If multiple start / stop times, list each separately.

## 2021-05-18 NOTE — PROGRESS NOTES
Chart reviewed and attempted to see patient for OT intervention. Patient with HD at bedside at this time. Will follow up tomorrow for OT intervention. Thank you.

## 2021-05-18 NOTE — PROGRESS NOTES
[facility] in [place], [place]  Inpatient Consult Progress Note      Patient name: Harrie Hamman  Patient : 1950  Patient MRN: [id number]  Consulting MD: Norma Barrett MD  Date of service: 21    CHIEF COMPLAINT:  Chief Complaint   Patient presents with    Altered mental status        PLAN:  Continue set speed 9400rpm, low speed 9000rpm   Cont home dose hydralazine/Imdur  Cont Tafamidis 61mg daily  Intolerant to BB/ACEi/ARB/ARNI due to aTTR  Intolerant to MRA due to hyperkalemia  Volume management per Nephrology; strict I/O, standing weights  HD today  Pulmonary hygiene including IS and nebulizers   Stop Zosyn   Cont Eraxis, appreciate ID recommendations   ID consult for C. Albicans in urine and sputum   Blood cultures negative   Drive line culture- rare diphtheroids   Coumadin dose per pharmacy; goal INR lowered to 1.8-2.5 due to GIB  Antibiotics for chronic sternal wound infection- continue Keflex po when appropriate  Appreciate EP recommendations- no changes to current therapy.    TSH 1.60- monitor for now   Bowel regimen  Advance diet   PT/OT   Standing weights only  Cont to avoid O2 on patient due to intolerance   Up to date on flu, PNA, and COVID vaccinations   Will need to start DC plan for home with PT/OT vs back to IPR     IMPRESSION:  Likely urosepsis  Acute respiratory failure   Chronic RV failure  Coronary artery disease  · [facility] (2016) high grade ramus and small PDA disease, borderline disease of LAD and takeoff of pRCA  Chronic systolic heart failure  · Stage C, NYHA class IV improved to IIIA symptoms with LVAD  · Combined ischemic and non-ischemic cardiomyopathy, 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 Kaveh Brito)  · C/b delayed extubation due to severe COPD  · C/b critical illness polyneuropathy  · C/b prolonged hospitalization post-LVAD, 55 days  · C/b sternal wound infection s/p debridement (by Dr. Cici Carbajal) s/p wound vac  · C/b sacral ulcer  · Would culture positive for Staph aureus, not MRSA  · C/b LVAD site drainage, improved  S/p CRT-ICD  · ICD fired due to electrolyte imbalance (2011)  H/o breast cancer (1992)   · s/p bilateral mastectomy/chemo and endometrial cancer s/p hysterectomy  · Lymphedema of LLE due to cancer treatment  Severe COPD with FEV1 50%  Depression  Atrial fibrillation  H/o \"two mini strokes\"  S/p fall with hip facture   · Right hip hemmiarthroplasty (5/23/18) by Dr. Hakan Wade)  · S/p removed hip hardwarare due to pain (4/15/19)  COPD severe  CKD, stage 4- on HD  Hyperkalemia, resolved  Pulmonary hypertension  Cardiac risk factors:  · Morbid obesity, Body mass index is 42.29 kg/m². · DM2 insulin dependent  · JED on Trilogy  · HL  Urinary incontinence, severe  · no procedures due to anticoagulation  Endometrial cancer (2002)  HTN  HL    Recent Events:  Slept well  No HD yesterday  PCT continues to trend down   No acute overnight events       CARDIAC IMAGING:  TTE 5/11/21 LVEF < 15%, LVIDd 5.96cm, TAPSE 0.99cm, RVIDd 4.14cm  TTE 4/19 shows improved LVIDd, 6.68 cm with RVIDd 5.14 cm and TAPSE 1.1 cm   TTE 4/28/21 LVIDd 7.37cm, RVIDd 5.27cm, TAPSE 1.44cm   Echo (9/24/19) LVEF 20-25%, AV opens 1:1, no AR  Echo (5/29/18) LVEF 10-%, ramps study done, report in epic  Echo (1/9/18) ramp study done, LVEDD 7.1cm  LHC (11/9/18) 2 vessel disease with 90% OM, 80% PDA, DSA to PDA branch  RHC 4/20 showed adequate Sal CI 3.0 but severely elevated RA pressure 24 mmHg  CXR negative 4/27/21     INTERVAL HISTORY:  Ms. Azeb Martinez is a 78 y.o. Female s/p LVAD implant with HM 2 and ESRD requiring HD that was recently admitted for RV failure and worsening renal function. She was discharged on 5/5/21 to [address] [place] [address] for rehabilitation before transitioning home to live her with daughter.  In the last few days she had worsening mental status changed, decreased UOP and respiratory distress after possible aspiration- she was intubated and admitted to [facility] for further management. LVAD INTERROGATION:  Device interrogated in person  Device function normal, normal flow, no events  LVAD   Pump Speed (RPM): 9400  Pump Flow (LPM): 5.1  MAP: 82  PI (Pulsitility Index): 5.8  Power: 5.8   Test: Yes  Back Up  at Bedside & Labeled: Yes  Power Module Test: Yes  Driveline Site Care: No  Driveline Dressing: Clean, Dry, and Intact  Outpatient: No  MAP in Therapeutic Range (Outpatient): Yes  Testing  Alarms Reviewed: Yes  Back up SC speed: 9400  Back up Low Speed Limit: 9000  Emergency Equipment with Patient?: Yes  Emergency procedures reviewed?: Yes  Drive line site inspected?: Yes  Drive line intergrity inspected?: Yes  Drive line dressing changed?: No    PHYSICAL EXAM:  Visit Vitals  /80 Comment: treatment ended. Blood returned   Pulse 96   Temp 99 °F (37.2 °C)   Resp 20   Ht 5' 7\" (1.702 m)   Wt 238 lb 12.1 oz (108.3 kg)   SpO2 95%   BMI 37.39 kg/m²     General: Patient is well developed, well-nourished in no acute distress, sedated   HEENT: Normocephalic and atraumatic. No scleral icterus. Pupils are equal, round and reactive to light and accomodation. No conjunctival injection. Oropharynx is clear. Neck: Supple. No evidence of thyroid enlargements or lymphadenopathy. JVD: Cannot be appreciated   Lungs: Breath sounds are equal and clear bilaterally. No wheezes, rhonchi, or rales. On Vent  Heart: Regular rate and rhythm with normal S1 and S2. No murmurs, gallops or rubs. Abdomen: Soft, no mass or tenderness. No organomegaly or hernia. Bowel sounds present. Genitourinary and rectal: deferred  Extremities: No cyanosis, clubbing, or edema. Neurologic: No focal sensory or motor deficits are noted. Grossly intact. Psychiatric: Awake, alert an doriented x 3. Appropriate mood and affect.   Skin: Warm, dry and well perfused. No lesions, nodules or rashes are noted. REVIEW OF SYSTEMS:  Review of Systems   Constitutional: Positive for malaise/fatigue. Negative for chills and fever. Respiratory: Negative for cough and shortness of breath. Cardiovascular: Negative for chest pain and leg swelling. Gastrointestinal: Negative for heartburn and nausea. Genitourinary: Negative. Musculoskeletal: Negative. Neurological: Negative. Endo/Heme/Allergies: Negative. Psychiatric/Behavioral: The patient does not have insomnia. PAST MEDICAL HISTORY:  Past Medical History:   Diagnosis Date    Asthma     [facility]     breast    [facility]     endometrial    Congestive heart failure, unspecified     CRI (chronic renal insufficiency)     Depression     Diabetes ([facility]     Hypertension     Severe sepsis with acute organ dysfunction ([facility] 5/11/2021       PAST SURGICAL HISTORY:  Past Surgical History:   Procedure Laterality Date    HX HERNIA REPAIR      HX HYSTERECTOMY      HX MASTECTOMY      IR INSERT NON TUNL CVC OVER 5 YRS  4/26/2021    IR INSERT TUNL CVC W/O PORT OVER 5 YR  5/5/2021       FAMILY HISTORY:  No family history on file. SOCIAL HISTORY:  Social History     Socioeconomic History    Marital status:      Spouse name: Not on file    Number of children: Not on file    Years of education: Not on file    Highest education level: Not on file   Tobacco Use    Smoking status: Never Smoker    Smokeless tobacco: Never Used   Substance and Sexual Activity    Alcohol use: Not Currently       LABORATORY RESULTS:     Labs Latest Ref Rng & Units 5/18/2021 5/17/2021 5/16/2021 5/15/2021 5/14/2021 5/13/2021 5/13/2021   WBC 3.6 - 11.0 K/uL 3.7 3.7 4.1 4.5 7.8 - 7.4   RBC 3.80 - 5.20 M/uL 3.29(L) 3.08(L) 3.22(L) 2.88(L) 3.14(L) - 3.02(L)   Hemoglobin 11.5 - 16.0 g/dL 9. 0(L) 8.6(L) 8.8(L) 7. 7(L) 8.6(L) - 8. 3(L)   Hematocrit 35.0 - 47.0 % 30. 0(L) 28. 3(L) 29. 9(L) 26. 8(L) 28. 9(L) - 27. 6(L) MCV 80.0 - 99.0 FL 91.2 91.9 92.9 93.1 92.0 - 91.4   Platelets 006 - 234 K/uL 139(L) 127(L) 134(L) 126(L) 149(L) - 130(L)   Lymphocytes 12 - 49 % 5(L) 11(L) 9(L) 9(L) 4(L) - 5(L)   Monocytes 5 - 13 % 12 13 11 11 7 - 6   Eosinophils 0 - 7 % 0 0 0 0 0 - 0   Basophils 0 - 1 % 0 1 0 0 0 - 0   Bands 0 - 6 % - - - 8(H) 11(H) - -   Albumin 3.5 - 5.0 g/dL 3.2(L) 3. 1(L) 3.4(L) 3. 2(L) 3.6 - 3.4(L)   Calcium 8.5 - 10.1 MG/DL 8.7 8.7 9.1 8.8 9.3 - 9.1   Glucose 65 - 100 mg/dL 130(H) 111(H) 119(H) 118(H) 96 - 110(H)   BUN 6 - 20 MG/DL 51(H) 41(H) 28(H) 49(H) 31(H) - 45(H)   Creatinine 0.55 - 1.02 MG/DL 5.74(H) 4.88(H) 3.63(H) 4.79(H) 3.67(H) - 4.78(H)   Sodium 136 - 145 mmol/L 135(L) 136 136 135(L) 138 - 137   Potassium 3.5 - 5.1 mmol/L 4.0 4.2 4.0 4.4 4.1 4.2 4.2   TSH 0.36 - 3.74 uIU/mL - - - 1.60 - - -   LDH 81 - 246 U/L 199 156 160 151 189 - 156   Some recent data might be hidden     Lab Results   Component Value Date/Time    TSH 1.60 05/15/2021 03:14 AM    TSH 5.36 (H) 04/24/2021 04:32 AM    TSH 3.99 (H) 04/17/2021 04:54 AM    TSH 2.980 10/01/2020 12:00 AM       ALLERGY:  Allergies   Allergen Reactions    Benzodiazepines Other (comments)     Respiratory issues        CURRENT MEDICATIONS:    Current Facility-Administered Medications:     warfarin - HOLD on 5/18, , Other, ONCE, Triny Limon, NP    senna-docusate (PERICOLACE) 8.6-50 mg per tablet 1 Tab, 1 Tab, Oral, DAILY, Triny Limon NP, 1 Tab at 05/17/21 1237    glucose chewable tablet 16 g, 4 Tab, Oral, PRN, Elda Pineda MD    dextrose (D50W) injection syrg 12.5-25 g, 25-50 mL, IntraVENous, PRN, Elda Pineda MD    glucagon [facility]) injection 1 mg, 1 mg, IntraMUSCular, PRN, Elda Pineda MD    insulin lispro (HUMALOG) injection, , SubCUTAneous, AC&HS, Mariia Martinez MD, 2 Units at 05/18/21 0724    famotidine (PEPCID) tablet 20 mg, 20 mg, Oral, DAILY, Shane Del Cid MD, 20 mg at 05/18/21 0838    benzocaine-zinc cl-benzalkonium cl (ORAJEL) 20-0.1-0.02 % mucosal gel, , Oral, PRN, Lazara Leavitt MD    magic mouthwash cpd (without sucralfate), 5 mL, Oral, DAILY PRN, Lazara Leavitt MD    anidulafungin (ERAXIS) 100 mg in 0.9% sodium chloride 130 mL IVPB, 100 mg, IntraVENous, Q24H, Alesha Melgar MD, 100 mg at 05/17/21 1804    balsam peru-castor oiL (VENELEX) ointment, , Topical, BID, Lazara Leavitt MD, Given at 05/18/21 9353    albumin human 25% (BUMINATE) solution 50 g, 50 g, IntraVENous, DIALYSIS PRN, Jaspreet Mendoza MD    heparin (porcine) 1,000 unit/mL injection 1,900 Units, 1,900 Units, InterCATHeter, DIALYSIS PRN, 1,900 Units at 05/15/21 1115 **AND** heparin (porcine) 1,000 unit/mL injection 1,900 Units, 1,900 Units, InterCATHeter, DIALYSIS PRN, Jaspreet Mendoza MD, 1,900 Units at 05/15/21 1115    hydrALAZINE (APRESOLINE) 20 mg/mL injection 20 mg, 20 mg, IntraVENous, Q6H PRN, Braxton, Triny B, NP    hydrALAZINE (APRESOLINE) 20 mg/mL injection 10 mg, 10 mg, IntraVENous, Q6H PRN, Braxton, Triny B, NP, 10 mg at 05/13/21 1052    ranolazine ER (RANEXA) tablet 1,000 mg, 1,000 mg, Oral, BID, Braxton, Triny B, NP, 1,000 mg at 05/18/21 0838    hydrALAZINE (APRESOLINE) tablet 100 mg, 100 mg, Oral, TID, Braxton, Triny B, NP, 100 mg at 05/18/21 3619    isosorbide mononitrate ER (IMDUR) tablet 30 mg, 30 mg, Oral, DAILY, Braxton, Triny B, NP, 30 mg at 05/18/21 0838    tafamidis cap 61 mg (Patient Supplied), 61 mg, Oral, DAILY, Lazara Leavitt MD, 61 mg at 05/18/21 0838    ondansetron (ZOFRAN) injection 4 mg, 4 mg, IntraVENous, Q4H PRN, Valeriy Morrow MD    epoetin amalia-epbx (RETACRIT) injection 20,000 Units, 20,000 Units, SubCUTAneous, Q TUE, THU & SAT, Saima Grullon MD, 20,000 Units at 05/15/21 2148    zinc oxide-cod liver oil (DESITIN) 40 % paste, , Topical, Q12H, Cecilio Randolph MD, Given at 05/18/21 0839    sodium chloride (NS) flush 5-10 mL, 5-10 mL, IntraVENous, PRN, Valeriy Khoury MD    sodium chloride (NS) flush 5-40 mL, 5-40 mL, IntraVENous, Q8H, Halina Hatchet, MD, 30 mL at 05/18/21 0716    sodium chloride (NS) flush 5-40 mL, 5-40 mL, IntraVENous, PRN, Halina Hatchet, MD    acetaminophen (TYLENOL) tablet 650 mg, 650 mg, Oral, Q6H PRN **OR** acetaminophen (TYLENOL) suppository 650 mg, 650 mg, Rectal, Q6H PRN, Halina Hatchet, MD    albuterol-ipratropium (DUO-NEB) 2.5 MG-0.5 MG/3 ML, 3 mL, Nebulization, Q6H PRN, Halina Hatchet, MD    Warfarin - Pharmacy to Dose, , Other, Rx Dosing/Monitoring, Jesus George NP    sodium chloride (NS) flush 5-40 mL, 5-40 mL, IntraVENous, Q8H, Triny Limon NP, 30 mL at 05/18/21 0716    sodium chloride (NS) flush 5-40 mL, 5-40 mL, IntraVENous, PRN, Emerita Limon NP    nystatin (MYCOSTATIN) 100,000 unit/gram powder, , Topical, PRN, Ray Cooley MD    PATIENT CARE TEAM:  Patient Care Team:  Venus Barth NP as PCP - General (Nurse Practitioner)  Lydia Tse MD (Cardiology)  Lennox Larger, MD as Physician (Cardiology)  Leon Raman LPN as Care Coordinator  Renetta Sanders MD (Cardiothoracic Surgery)  Madeleine Claros MD (Cardiology)     Thank you for allowing me to participate in this patient's care. Adrian Vargas NP  [facility] [address]  [address], [address]  Phone: [phone]    [facility] ATTENDING ADDENDUM    Patient was seen and examined in person. Data and notes were reviewed. I have discussed and agree with the plan as noted in the NP note above without further additions.     Tej Figueroa MD PhD  Rufus Pulido

## 2021-05-18 NOTE — PROCEDURES
[facility]  [phone]    Vitals   Pre   Post   Assessment   Pre   Post     Temp  Temp: 98.3 °F (36.8 °C) (05/18/21 1230) 98.1   LOC  A/O x 3 A/O x 3   HR   Pulse (Heart Rate): (!) 108 (05/18/21 1124) 84 Lungs   CTA CTA   B/P   BP: (doppler 78) (05/18/21 1330) 82 Cardiac   LVAD  LVAD   Resp   Resp Rate: 24 (05/18/21 1124) 21 Skin   Intact Catheter Intact catheter   Pain level  Pain Intensity 1: 0 (05/18/21 1124) 0 Edema  Trace B/L LE  Abdominal. distension     Trace LE  Abdominal distension   Orders:    Duration:   Start:    1230 End:   1530 Total:   3 hours   Dialyzer:   Dialyzer/Set Up Inspection: Revaclear (05/18/21 1230)   K Bath:   Dialysate K (mEq/L): 3.5 (05/18/21 1230)   Ca Bath:   Dialysate CA (mEq/L): 2.5 (05/18/21 1230)   Na/Bicarb:   Dialysate NA (mEq/L): 140 (05/18/21 1230)   Target Fluid Removal:   Goal/Amount of Fluid to Remove (mL): 2000 mL (05/18/21 1230)   Access     Type & Location:   Right tunneled catheter. Good aspirations/flushes. Dressing changed today. No s/s infection noted.    Labs     Obtained/Reviewed   Critical Results Called   Date when labs were drawn-  Hgb-    HGB   Date Value Ref Range Status   05/18/2021 9.0 (L) 11.5 - 16.0 g/dL Final     K-    Potassium   Date Value Ref Range Status   05/18/2021 4.0 3.5 - 5.1 mmol/L Final     Ca-   Calcium   Date Value Ref Range Status   05/18/2021 8.7 8.5 - 10.1 MG/DL Final     Bun-   BUN   Date Value Ref Range Status   05/18/2021 51 (H) 6 - 20 MG/DL Final     Creat-   Creatinine   Date Value Ref Range Status   05/18/2021 5.74 (H) 0.55 - 1.02 MG/DL Final        Medications/ Blood Products Given     Name   Dose   Route and Time     Heparin AP=1.9ml  =1.9ml IC at 1530             Blood Volume Processed (BVP):    65.9 Net Fluid   Removed:  2500-514=3260 ml  (2 kg)   Comments   Time Out Done: 1150  Primary Nurse Rpt Pre:Lelia Quiroz, RN  Primary Nurse R[address], RN  [address] [facility] Plan:Continue [facility] as prescribed  Tx Summary: Treatment initiated via right Perm. Catheter. Tolerated treatment. All possible blood returned. Catheter ports flushed with NS; heparin instilled as dwell. Sterile caps applied. Endorsed to primary, RYAN Cruz RN  Admiting Diagnosis:Acute Encephalopathy  Pt's previous clinic-N/A  Consent signed - Informed Consent Verified: Yes (05/18/21 1230)  [facility] Consent - N/A  Hepatitis Status- Negative on 4/21/2021  Machine #- Machine Number: [id number] (05/18/21 1230)  Telemetry status-yes  Pre-dialysis wt. - Pre-Dialysis Weight: 109 kg (240 lb 4.8 oz) (05/18/21 1230)  Post dialysis wt:107 kg (-2 kg)

## 2021-05-18 NOTE — PROGRESS NOTES
Pharmacist Note  Warfarin Dosing  Consult provided for this 78 y. o.female to manage warfarin for LVAD    INR Goal: 1.8-2.5  Home regimen/ tablet size: 5 mg daily    Drugs that may increase INR: None  Drugs that may decrease INR: None  Other current anticoagulants/ drugs that may increase bleeding risk: heparin  Risk factors: Age > 65, dialysis  Daily INR ordered: YES    Recent Labs     05/18/21  0354 05/17/21  0432 05/16/21  0332   HGB 9.0* 8.6* 8.8*   INR 4.2* 4.1* 3.1*     Date               INR                  Dose  5/11  3.3  hold   5/12                1.9                    2 mg      5/13                 1.4                   4 mg     5/14                 1.4                   6 mg      5/15  1.9  4 mg  5/16  3.1  HOLD  5/17                 4.1                  HOLD     5/18                 4.2                  HOLD                                                                               Assessment/ Plan: Will HOLD warfarin today     Pharmacy will continue to monitor daily and adjust therapy as indicated. Please contact the pharmacist at x [phone] or  for outpatient recommendations if needed.

## 2021-05-18 NOTE — PROGRESS NOTES
Problem: Mobility Impaired (Adult and Pediatric)  Goal: *Acute Goals and Plan of Care (Insert Text)  Description: FUNCTIONAL STATUS PRIOR TO ADMISSION: Patient was modified independent using a rollator for functional mobility. HOME SUPPORT PRIOR TO ADMISSION: The patient lived alone with her daughter to provide assistance as needed. Recent prolonged admission to [address] [place] [address], discharged to rehab then readmitted from rehab. Physical Therapy Goals  Initiated 5/16/2021  1. Patient will move from supine to sit and sit to supine  and scoot up and down in bed with minimal assistance/contact guard assist within 7 day(s). 2.  Patient will transfer from bed to chair and chair to bed with supervision/set-up using the least restrictive device within 7 day(s). 3.  Patient will perform sit to stand with supervision/set-up within 7 day(s). 4.  Patient will ambulate with supervision/set-up for 50 feet with the least restrictive device within 7 day(s). 5.  Patient will ascend/descend 1 stairs without handrail(s) with supervision/set-up within 7 day(s). Outcome: Progressing Towards Goal    PHYSICAL THERAPY TREATMENT  Patient: Royal Courser (88 y.o. female)  Date: 5/18/2021  Diagnosis: Acute encephalopathy [G93.40] Acute encephalopathy       Precautions: Fall(prior LVAD)  Chart, physical therapy assessment, plan of care and goals were reviewed. ASSESSMENT  Patient continues with skilled PT services and is progressing towards goals. Pt reports feeling better but still has c/o tiredness. Pt was able to increase gait tolerance with assistance. Utilizing chair follow due to decrease LE stability. Pt did have difficulties reconnecting black power lead to battery requiring assistance. Will continue to progress pt as as able.      Current Level of Function Impacting Discharge (mobility/balance):min/CGA x2    Other factors to consider for discharge:below baseline          PLAN :  Patient continues to benefit from skilled intervention to address the above impairments. Continue treatment per established plan of care. to address goals. Recommendation for discharge: (in order for the patient to meet his/her long term goals)  Therapy 3 hours per day 5-7 days per week    This discharge recommendation:  Has not yet been discussed the attending provider and/or case management    IF patient discharges home will need the following DME: patient owns DME required for discharge       SUBJECTIVE:   Patient stated I am so tired .     OBJECTIVE DATA SUMMARY:   Critical Behavior:  Neurologic State: Alert  Orientation Level: Oriented X4  Cognition: Decreased attention/concentration, Memory loss  Safety/Judgement: Decreased insight into deficits, Decreased awareness of need for safety, Decreased awareness of need for assistance  Functional Mobility Training:  Bed Mobility:                    Transfers:  Sit to Stand: Contact guard assistance;Minimum assistance;Assist x2  Stand to Sit: Contact guard assistance;Minimum assistance;Assist x2                             Balance:  Sitting: Impaired  Sitting - Static: Fair (occasional)  Sitting - Dynamic: Fair (occasional)  Standing: Impaired  Standing - Static: Fair  Standing - Dynamic : Fair  Ambulation/Gait Training:     Assistive Device: Gait belt;Walker, rollator(chair follow)  Ambulation - Level of Assistance: Contact guard assistance;Minimal assistance;Assist x2   25feet      Gait Abnormalities: Decreased step clearance        Base of Support: Widened        Step Length: Left shortened;Right shortened                    Stairs: Therapeutic Exercises:   Seated LAQ and marches   Pain Rating:  No complaints     Activity Tolerance:   Limited     After treatment patient left in no apparent distress:   Sitting in chair and Call bell within reach    COMMUNICATION/COLLABORATION:   The patients plan of care was discussed with: Occupational therapist and Registered nurse.      Zhanna Webb RAINER Wood   Time Calculation: 29 mins

## 2021-05-19 NOTE — PROGRESS NOTES
[facility] in [place], [place]  Inpatient Consult Progress Note      Patient name: Merary Reynoso  Patient : 1950  Patient MRN: [id number]  Consulting MD: Connor Yanez MD  Date of service: 21    CHIEF COMPLAINT:  Chief Complaint   Patient presents with    Altered mental status        PLAN:  Continue set speed 9400rpm, low speed 9000rpm   Cont home dose hydralazine/Imdur  Cont Tafamidis 61mg daily  Intolerant to BB/ACEi/ARB/ARNI due to aTTR  Intolerant to MRA due to hyperkalemia  Volume management per Nephrology; strict I/O, standing weights  HD tomorrow   Pulmonary hygiene including IS and nebulizers   Cont Eraxis, appreciate ID recommendations   ID consult for C. Albicans in urine and sputum   Blood cultures negative   Drive line culture- rare diphtheroids   Repeat Urine culture   Coumadin dose per pharmacy; goal INR lowered to 1.8-2.5 due to GIB  Antibiotics for chronic sternal wound infection- continue Keflex po when appropriate  Appreciate EP recommendations- no changes to current therapy.    TSH 1.60- monitor for now   Bowel regimen  Advance diet   PT/OT   Standing weights only  Cont to avoid O2 on patient due to intolerance   Up to date on flu, PNA, and COVID vaccinations   Will need to start DC plan for home with PT/OT maybe early next week      IMPRESSION:  Likely urosepsis  Acute respiratory failure   Chronic RV failure  Coronary artery disease  · [facility] (2016) high grade ramus and small PDA disease, borderline disease of LAD and takeoff of pRCA  Chronic systolic heart failure  · Stage C, NYHA class IV improved to IIIA symptoms with LVAD  · Combined ischemic and non-ischemic cardiomyopathy, 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 Lalita Muñoz)  · C/b delayed extubation due to severe COPD  · C/b critical illness polyneuropathy  · C/b prolonged hospitalization post-LVAD, 55 days  · C/b sternal wound infection s/p debridement (by Dr. Elvi Arndt) s/p wound vac  · C/b sacral ulcer  · Would culture positive for Staph aureus, not MRSA  · C/b LVAD site drainage, improved  S/p CRT-ICD  · ICD fired due to electrolyte imbalance (2011)  H/o breast cancer (1992)   · s/p bilateral mastectomy/chemo and endometrial cancer s/p hysterectomy  · Lymphedema of LLE due to cancer treatment  Severe COPD with FEV1 50%  Depression  Atrial fibrillation  H/o \"two mini strokes\"  S/p fall with hip facture   · Right hip hemmiarthroplasty (5/23/18) by Dr. Gisella Altamirano)  · S/p removed hip hardwarare due to pain (4/15/19)  COPD severe  CKD, stage 4- on HD  Hyperkalemia, resolved  Pulmonary hypertension  Cardiac risk factors:  · Morbid obesity, Body mass index is 42.29 kg/m². · DM2 insulin dependent  · JED on Trilogy  · HL  Urinary incontinence, severe  · no procedures due to anticoagulation  Endometrial cancer (2002)  HTN  HL    Recent Events:  Slept well  Tolerated HD  PCT continues to trend down   No acute overnight events       CARDIAC IMAGING:  TTE 5/11/21 LVEF < 15%, LVIDd 5.96cm, TAPSE 0.99cm, RVIDd 4.14cm  TTE 4/19 shows improved LVIDd, 6.68 cm with RVIDd 5.14 cm and TAPSE 1.1 cm   TTE 4/28/21 LVIDd 7.37cm, RVIDd 5.27cm, TAPSE 1.44cm   Echo (9/24/19) LVEF 20-25%, AV opens 1:1, no AR  Echo (5/29/18) LVEF 10-%, ramps study done, report in epic  Echo (1/9/18) ramp study done, LVEDD 7.1cm  LHC (11/9/18) 2 vessel disease with 90% OM, 80% PDA, DSA to PDA branch  RHC 4/20 showed adequate Sal CI 3.0 but severely elevated RA pressure 24 mmHg  CXR negative 4/27/21     INTERVAL HISTORY:  Ms. Gabriella Lux is a 78 y.o. Female s/p LVAD implant with HM 2 and ESRD requiring HD that was recently admitted for RV failure and worsening renal function. She was discharged on 5/5/21 to [facility] for rehabilitation before transitioning home to live her with daughter.  In the last few days she had worsening mental status changed, decreased UOP and respiratory distress after possible aspiration- she was intubated and admitted to [facility] for further management. LVAD INTERROGATION:  Device interrogated in person  Device function normal, normal flow, no events  LVAD   Pump Speed (RPM): 9400  Pump Flow (LPM): 4.9  MAP: 82  PI (Pulsitility Index): 5.7  Power: 5.4   Test: No  Back Up  at Bedside & Labeled: Yes  Power Module Test: No  Driveline Site Care: No  Driveline Dressing: Clean, Dry, and Intact  Outpatient: No  MAP in Therapeutic Range (Outpatient): Yes  Testing  Alarms Reviewed: Yes  Back up SC speed: 9400  Back up Low Speed Limit: 9000  Emergency Equipment with Patient?: Yes  Emergency procedures reviewed?: Yes  Drive line site inspected?: Yes  Drive line intergrity inspected?: Yes  Drive line dressing changed?: No    PHYSICAL EXAM:  Visit Vitals  /80 Comment: treatment ended. Blood returned   Pulse 78   Temp 97.7 °F (36.5 °C)   Resp 20   Ht 5' 7\" (1.702 m)   Wt 238 lb 12.1 oz (108.3 kg)   SpO2 96%   BMI 37.39 kg/m²     General: Patient is well developed, well-nourished in no acute distress, sedated   HEENT: Normocephalic and atraumatic. No scleral icterus. Pupils are equal, round and reactive to light and accomodation. No conjunctival injection. Oropharynx is clear. Neck: Supple. No evidence of thyroid enlargements or lymphadenopathy. JVD: Cannot be appreciated   Lungs: Breath sounds are equal and clear bilaterally. No wheezes, rhonchi, or rales. On Vent  Heart: Regular rate and rhythm with normal S1 and S2. No murmurs, gallops or rubs. Abdomen: Soft, no mass or tenderness. No organomegaly or hernia. Bowel sounds present. Genitourinary and rectal: deferred  Extremities: No cyanosis, clubbing, or edema. Neurologic: No focal sensory or motor deficits are noted. Grossly intact. Psychiatric: Awake, alert an doriented x 3. Appropriate mood and affect.   Skin: Warm, dry and well perfused. No lesions, nodules or rashes are noted. REVIEW OF SYSTEMS:  Review of Systems   Constitutional: Positive for malaise/fatigue. Negative for chills and fever. Respiratory: Negative for cough and shortness of breath. Cardiovascular: Negative for chest pain and leg swelling. Gastrointestinal: Negative for heartburn and nausea. Genitourinary: Negative. Musculoskeletal: Negative. Neurological: Negative. Endo/Heme/Allergies: Negative. Psychiatric/Behavioral: The patient does not have insomnia. PAST MEDICAL HISTORY:  Past Medical History:   Diagnosis Date    Asthma     Cancer ([facility]     breast    [facility]     endometrial    Congestive heart failure, unspecified     CRI (chronic renal insufficiency)     Depression     Diabetes [facility]     Hypertension     Severe sepsis with acute organ dysfunction ([facility] 5/11/2021       PAST SURGICAL HISTORY:  Past Surgical History:   Procedure Laterality Date    HX HERNIA REPAIR      HX HYSTERECTOMY      HX MASTECTOMY      IR INSERT NON TUNL CVC OVER 5 YRS  4/26/2021    IR INSERT TUNL CVC W/O PORT OVER 5 YR  5/5/2021       FAMILY HISTORY:  No family history on file. SOCIAL HISTORY:  Social History     Socioeconomic History    Marital status:      Spouse name: Not on file    Number of children: Not on file    Years of education: Not on file    Highest education level: Not on file   Tobacco Use    Smoking status: Never Smoker    Smokeless tobacco: Never Used   Substance and Sexual Activity    Alcohol use: Not Currently     Social Determinants of Health     Financial Resource Strain:     Difficulty of Paying Living Expenses:    Food Insecurity:     Worried About Running Out of Food in the Last Year:     [address] in the Last Year:    Transportation Needs:     Lack of Transportation (Medical):      Lack of Transportation (Non-Medical):    Physical Activity:     Days of Exercise per Week:  Minutes of Exercise per Session:    Stress:     Feeling of Stress :    Social Connections:     Frequency of Communication with Friends and Family:     Frequency of Social Gatherings with Friends and Family:     Attends L[employer] Services:     Active Member of Clubs or Organizations:     Attends Club or Organization Meetings:     Marital Status:        LABORATORY RESULTS:     Labs Latest Ref Rng & Units 5/19/2021 5/18/2021 5/17/2021 5/16/2021 5/15/2021 5/14/2021 5/13/2021   WBC 3.6 - 11.0 K/uL 3.5(L) 3.7 3.7 4.1 4.5 7.8 -   RBC 3.80 - 5.20 M/uL 3.38(L) 3.29(L) 3.08(L) 3.22(L) 2.88(L) 3.14(L) -   Hemoglobin 11.5 - 16.0 g/dL 9.1(L) 9.0(L) 8.6(L) 8.8(L) 7. 7(L) 8.6(L) -   Hematocrit 35.0 - 47.0 % 31. 2(L) 30. 0(L) 28. 3(L) 29. 9(L) 26. 8(L) 28. 9(L) -   MCV 80.0 - 99.0 FL 92.3 91.2 91.9 92.9 93.1 92.0 -   Platelets 907 - 781 K/uL 143(L) 139(L) 127(L) 134(L) 126(L) 149(L) -   Lymphocytes 12 - 49 % 11(L) 5(L) 11(L) 9(L) 9(L) 4(L) -   Monocytes 5 - 13 % 13 12 13 11 11 7 -   Eosinophils 0 - 7 % 0 0 0 0 0 0 -   Basophils 0 - 1 % 1 0 1 0 0 0 -   Bands 0 - 6 % - - - - 8(H) 11(H) -   Albumin 3.5 - 5.0 g/dL 3. 0(L) 3. 2(L) 3. 1(L) 3.4(L) 3. 2(L) 3.6 -   Calcium 8.5 - 10.1 MG/DL 8.8 8.7 8.7 9.1 8.8 9.3 -   Glucose 65 - 100 mg/dL 116(H) 130(H) 111(H) 119(H) 118(H) 96 -   BUN 6 - 20 MG/DL 27(H) 51(H) 41(H) 28(H) 49(H) 31(H) -   Creatinine 0.55 - 1.02 MG/DL 3.86(H) 5.74(H) 4.88(H) 3.63(H) 4.79(H) 3.67(H) -   Sodium 136 - 145 mmol/L 139 135(L) 136 136 135(L) 138 -   Potassium 3.5 - 5.1 mmol/L 3.9 4.0 4.2 4.0 4.4 4.1 4.2   TSH 0.36 - 3.74 uIU/mL - - - - 1.60 - -   LDH 81 - 246 U/L 186 199 156 160 151 189 -   Some recent data might be hidden     Lab Results   Component Value Date/Time    TSH 1.60 05/15/2021 03:14 AM    TSH 5.36 (H) 04/24/2021 04:32 AM    TSH 3.99 (H) 04/17/2021 04:54 AM    TSH 2.980 10/01/2020 12:00 AM       ALLERGY:  Allergies   Allergen Reactions    Benzodiazepines Other (comments)     Respiratory issues CURRENT MEDICATIONS:    Current Facility-Administered Medications:     warfarin - HOLD 5/19, , Other, ONCE, Triny Limon, NP    artificial saliva (MOUTH KOTE) 1 Spray, 1 Spray, Oral, PRN, Triny Limon, NP    PARoxetine (PAXIL) tablet 20 mg, 20 mg, Oral, DAILY, Festus Ribeiro MD, 20 mg at 05/19/21 1733    hydrocortisone (CORTAID) 1 % cream, , Topical, PRN, Festus Ribeiro MD    senna-docusate (PERICOLACE) 8.6-50 mg per tablet 1 Tab, 1 Tablet, Oral, DAILY, Triny Limon, NP, 1 Tablet at 05/17/21 1237    glucose chewable tablet 16 g, 4 Tablet, Oral, PRN, Elda Pineda MD    dextrose (D50W) injection syrg 12.5-25 g, 25-50 mL, IntraVENous, PRN, Elda Pineda MD    glucagon [facility]) injection 1 mg, 1 mg, IntraMUSCular, PRN, Elda Pineda MD    insulin lispro (HUMALOG) injection, , SubCUTAneous, AC&HS, Lucius Jack MD, 2 Units at 05/18/21 0724    famotidine (PEPCID) tablet 20 mg, 20 mg, Oral, DAILY, Shella Barthel, MD, 20 mg at 05/19/21 5387    benzocaine-zinc cl-benzalkonium cl (ORAJEL) 20-0.1-0.02 % mucosal gel, , Oral, PRN, Shella Barthel, MD    magic mouthwash cpd (without sucralfate), 5 mL, Oral, DAILY PRN, Shella Barthel, MD    anidulafungin (ERAXIS) 100 mg in 0.9% sodium chloride 130 mL IVPB, 100 mg, IntraVENous, Q24H, Kasia Méndez MD, 100 mg at 05/18/21 2040    balsam peru-castor oiL (VENELEX) ointment, , Topical, BID, Shella Barthel, MD, Given at 05/19/21 1035    albumin human 25% (BUMINATE) solution 50 g, 50 g, IntraVENous, DIALYSIS PRN, Jaspreet Mendoza MD    heparin (porcine) 1,000 unit/mL injection 1,900 Units, 1,900 Units, InterCATHeter, DIALYSIS PRN, 1,900 Units at 05/18/21 1522 **AND** heparin (porcine) 1,000 unit/mL injection 1,900 Units, 1,900 Units, InterCATHeter, DIALYSIS PRN, Andrew Mclaughlin MD, 1,900 Units at 05/18/21 1521    hydrALAZINE (APRESOLINE) 20 mg/mL injection 20 mg, 20 mg, IntraVENous, Q6H PRN, Triny Limon, NP    hydrALAZINE (APRESOLINE) 20 mg/mL injection 10 mg, 10 mg, IntraVENous, Q6H PRN, Braxton, Triny B, NP, 10 mg at 05/13/21 1052    ranolazine ER (RANEXA) tablet 1,000 mg, 1,000 mg, Oral, BID, Braxton, Triny B, NP, 1,000 mg at 05/19/21 1033    hydrALAZINE (APRESOLINE) tablet 100 mg, 100 mg, Oral, TID, Braxton, Triny B, NP, 100 mg at 05/19/21 0926    isosorbide mononitrate ER (IMDUR) tablet 30 mg, 30 mg, Oral, DAILY, Braxton, Triny B, NP, 30 mg at 05/19/21 0926    tafamidis cap 61 mg (Patient Supplied), 61 mg, Oral, DAILY, Whitney Baig MD, 61 mg at 05/19/21 1034    ondansetron (ZOFRAN) injection 4 mg, 4 mg, IntraVENous, Q4H PRN, Deejay Morrow MD    epoetin amalia-epbx (RETACRIT) injection 20,000 Units, 20,000 Units, SubCUTAneous, Q TUE, THU & SAT, Derik Ramos MD, 20,000 Units at 05/18/21 2041    zinc oxide-cod liver oil (DESITIN) 40 % paste, , Topical, Q12H, Irene MCGREGOR MD, Given at 05/19/21 1035    sodium chloride (NS) flush 5-10 mL, 5-10 mL, IntraVENous, PRN, Shama Bernardo MD    sodium chloride (NS) flush 5-40 mL, 5-40 mL, IntraVENous, Q8H, Volodymyr Wright MD, 10 mL at 05/19/21 0600    sodium chloride (NS) flush 5-40 mL, 5-40 mL, IntraVENous, PRN, Volodymyr Wright MD    acetaminophen (TYLENOL) tablet 650 mg, 650 mg, Oral, Q6H PRN **OR** acetaminophen (TYLENOL) suppository 650 mg, 650 mg, Rectal, Q6H PRN, Volodymyr Wright MD    albuterol-ipratropium (DUO-NEB) 2.5 MG-0.5 MG/3 ML, 3 mL, Nebulization, Q6H PRN, Volodymyr Wright MD, 3 mL at 05/19/21 1045    Warfarin - Pharmacy to Dose, , Other, Rx Dosing/Monitoring, Sandeep Walter, NP    sodium chloride (NS) flush 5-40 mL, 5-40 mL, IntraVENous, Q8H, Braxton, Triny B, NP, 10 mL at 05/19/21 0600    sodium chloride (NS) flush 5-40 mL, 5-40 mL, IntraVENous, PRN, Braxton, Triny B, NP    nystatin (MYCOSTATIN) 100,000 unit/gram powder, , Topical, PRN, Irene Boyce MD    PATIENT CARE TEAM:  Patient Care Team:  Albert Quintero, NP as PCP - General (Nurse Practitioner)  Luz Elena Rangel MD (Cardiology)  Beverley Ruggiero MD as Physician (Cardiology)  Precious Bach LPN as Care Coordinator  Nathalie Avina MD (Cardiothoracic Surgery)  Carlos Waller MD (Cardiology)     Thank you for allowing me to participate in this patient's care. Steve Landeros NP  [facility] [address] [facility]  [address], [address]  Phone: [phone]    [facility] ATTENDING ADDENDUM    Patient was seen and examined in person. Data and notes were reviewed. I have discussed and agree with the plan as noted in the NP note above without further additions.     Gerardo Gunderson MD PhD  [facility] [id number]

## 2021-05-19 NOTE — PROGRESS NOTES
Pharmacist Note  Warfarin Dosing  Consult provided for this 78 y. o.female to manage warfarin for LVAD    INR Goal: 1.8-2.5  Home regimen/ tablet size: 5 mg daily    Drugs that may increase INR: None  Drugs that may decrease INR: None  Other current anticoagulants/ drugs that may increase bleeding risk: heparin  Risk factors: Age > 65, dialysis  Daily INR ordered: YES    Recent Labs     05/19/21  0454 05/19/21  0453 05/18/21  0354 05/17/21  0432   HGB 9.1*  --  9.0* 8.6*   INR  --  3.9* 4.2* 4.1*     Date               INR                  Dose  5/11  3.3  hold   5/12                1.9                    2 mg      5/13                 1.4                   4 mg     5/14                 1.4                   6 mg      5/15  1.9  4 mg  5/16  3.1  HOLD  5/17                 4.1                  HOLD     5/18                 4.2                  HOLD   5/19                 3.9                  HOLD                                                                              Assessment/ Plan: Will HOLD warfarin today     Pharmacy will continue to monitor daily and adjust therapy as indicated. Please contact the pharmacist at x [phone] or  for outpatient recommendations if needed.

## 2021-05-19 NOTE — PROGRESS NOTES
Hospitalist Progress Note  Shayla Holley MD  Answering service: [phone] from in house phone      Date of Service:  2021  NAME:  Harrie Hamman  :  1950  MRN:  [id number]    Admission Summary:   70F p/w SOB. Interval history / Subjective:   Patient seen and examined at bedside, feels well, no acute events. Daughter at bedside. CM now working on [employer] home with home health. ?next day or 2 if ok with CHF team.     Assessment & Plan:     #. Sepsis: Source is not clear, only positive culture is for Candida in urine.   - ID following on Zosyn vancomycin and Eraxis    #. Acute on chronic respiratory failure :due to sepsis, chf and severe copd- resolved. - extubated. doing well on her home trilogy ventilator at night. #. Chronic RV failure  #. CAD: home regimen  #. Chronic sCHF: BiVentricular , s/p LVAD, s/p ICD. 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 H/o breast cancer () - s/p bilateral mastectomy/chemo   #. Lymphedema of LLE due to cancer treatment  #. COPD: no acute exacerbation. FEV1 50%  #. AFib: AC as above   #. ROCIO on CKD stage 4: progressive CKD and CRS- started HD   - Nephro following: R- IJ permacath in situ- OP HD set up at [place], [place]     #. Hyperkalemia, resolved  #. Pulmonary hypertension  #. Morbid obesity, BMI 42.29 kg/m². #. DM2: A1c 7.2, SSI, AccuChecks, monitor  #. JED on Trilogy  #. Urinary incontinence, severe  #. Endometrial cancer ()- s/p Hysterectomy  #. HTN: chronic, stable, Home regimen, PRN BP meds. Monitor  #.  HLD: chronic, Stable, Home regimen    Code status: Full  DVT prophylaxis: warfarin  Care Plan discussed with: Patient/Family and Nurse  Disposition: [facility] Problems  Date Reviewed: 4/25/2021        Codes Class Noted POA    Severe sepsis with acute organ dysfunction [facility]) ICD-10-CM: A41.9, R65.20  ICD-9-CM: 038.9, 995.92 Acute 5/11/2021 Yes        * (Principal) Acute encephalopathy ICD-10-CM: G93.40  ICD-9-CM: 348.30  5/11/2021 Unknown            Review of Systems:   Pertinent items are mentioned in interval history. Vital Signs:    Last 24hrs VS reviewed since prior progress note. Most recent are:  Visit Vitals  /80   Pulse 81   Temp 98.2 °F (36.8 °C)   Resp 18   Ht 5' 7\" (1.702 m)   Wt 108.3 kg (238 lb 12.1 oz)   SpO2 98%   BMI 37.39 kg/m²         Intake/Output Summary (Last 24 hours) at 5/19/2021 1213  Last data filed at 5/18/2021 2330  Gross per 24 hour   Intake 370 ml   Output 2000 ml   Net -1630 ml        Physical Examination:   Evaluated face to face and examined 05/19/21    General:  Alert, oriented, No acute distress, chronically very sick looking, obese [employer]  Card:  humming VAD sound. good peripheral perfusion  Resp:  No accessory muscle use, fair AE, no wheezes. no crepitations  Abd:  Soft, non-tender, non-distended, BS+  Extremities:  No cyanosis or clubbing, significant edema signs. Venous insufficiency discoloration. Neuro:  Grossly normal, no focal neuro deficits, follows commands, speech wnl. Psych:  Good insight, not agitated.     Data Review:    Review and/or order of clinical lab test  Review and/or order of tests in the radiology section of CPT  Review and/or order of tests in the medicine section of CPT  Labs:     Recent Labs     05/19/21 0454 05/18/21  0354   WBC 3.5* 3.7   HGB 9.1* 9.0*   HCT 31.2* 30.0*   * 139*     Recent Labs     05/19/21  0454 05/19/21  0453 05/18/21  0354 05/17/21  0432     --  135* 136   K 3.9  --  4.0 4.2     --  101 101   CO2 27  --  23 26   BUN 27*  --  51* 41*   CREA 3.86*  --  5.74* 4.88* *  --  130* 111*   CA 8.8  --  8.7 8.7   MG  --  2.4 2.5* 2.4   PHOS  --  3.6 5.0* 3.9     Recent Labs     05/19/21 0454 05/18/21 0354 05/17/21 0432   ALT 8* 7* 9*   AP 48 59 46   TBILI 0.7 0.7 0.8   TP 7.3 7.4 7.1   ALB 3.0* 3.2* 3.1*   GLOB 4.3* 4.2* 4.0     Recent Labs     05/19/21 0453 05/18/21 0354 05/17/21 0432   INR 3.9* 4.2* 4.1*   PTP 38.2* 40.9* 40.6*      No results for input(s): FE, TIBC, PSAT, FERR in the last 72 hours. Lab Results   Component Value Date/Time    Folate 10.0 10/28/2020 03:56 AM      No results for input(s): PH, PCO2, PO2 in the last 72 hours. No results for input(s): CPK, CKNDX, TROIQ in the last 72 hours.     No lab exists for component: CPKMB  Lab Results   Component Value Date/Time    Cholesterol, total 129 04/16/2021 10:40 AM    HDL Cholesterol 33 04/16/2021 10:40 AM    LDL, calculated 74 04/16/2021 10:40 AM    Triglyceride 110 04/16/2021 10:40 AM    CHOL/HDL Ratio 3.9 04/16/2021 10:40 AM     Lab Results   Component Value Date/Time    Glucose (POC) 178 (H) 05/19/2021 11:27 AM    Glucose (POC) 108 05/19/2021 07:35 AM    Glucose (POC) 131 (H) 05/18/2021 09:22 PM    Glucose (POC) 129 (H) 05/18/2021 04:17 PM    Glucose (POC) 170 (H) 05/18/2021 11:27 AM     Lab Results   Component Value Date/Time    Color DARK YELLOW 05/11/2021 03:57 AM    Appearance TURBID (A) 05/11/2021 03:57 AM    Specific gravity 1.029 05/11/2021 03:57 AM    Specific gravity 1.013 11/09/2020 12:05 AM    pH (UA) 5.0 05/11/2021 03:57 AM    Protein 100 (A) 05/11/2021 03:57 AM    Glucose Negative 05/11/2021 03:57 AM    Ketone TRACE (A) 05/11/2021 03:57 AM    Bilirubin Negative 04/18/2021 05:08 PM    Urobilinogen 0.2 05/11/2021 03:57 AM    Nitrites Positive (A) 05/11/2021 03:57 AM    Leukocyte Esterase LARGE (A) 05/11/2021 03:57 AM    Epithelial cells MODERATE (A) 05/11/2021 03:57 AM    Bacteria 1+ (A) 05/11/2021 03:57 AM    WBC >100 (H) 05/11/2021 03:57 AM    RBC 20-50 05/11/2021 03:57 AM     Medications Reviewed:     Current Facility-Administered Medications   Medication Dose Route Frequency    warfarin - HOLD 5/19   Other ONCE    artificial saliva (MOUTH KOTE) 1 Spray  1 Spray Oral PRN    PARoxetine (PAXIL) tablet 20 mg  20 mg Oral DAILY    hydrocortisone (CORTAID) 1 % cream   Topical PRN    senna-docusate (PERICOLACE) 8.6-50 mg per tablet 1 Tab  1 Tablet Oral DAILY    glucose chewable tablet 16 g  4 Tablet Oral PRN    dextrose (D50W) injection syrg 12.5-25 g  25-50 mL IntraVENous PRN    glucagon (GLUCAGEN) injection 1 mg  1 mg IntraMUSCular PRN    insulin lispro (HUMALOG) injection   SubCUTAneous AC&HS    famotidine (PEPCID) tablet 20 mg  20 mg Oral DAILY    benzocaine-zinc cl-benzalkonium cl (ORAJEL) 20-0.1-0.02 % mucosal gel   Oral PRN    magic mouthwash cpd (without sucralfate)  5 mL Oral DAILY PRN    anidulafungin (ERAXIS) 100 mg in 0.9% sodium chloride 130 mL IVPB  100 mg IntraVENous Q24H    balsam peru-castor oiL (VENELEX) ointment   Topical BID    albumin human 25% (BUMINATE) solution 50 g  50 g IntraVENous DIALYSIS PRN    heparin (porcine) 1,000 unit/mL injection 1,900 Units  1,900 Units InterCATHeter DIALYSIS PRN    And    heparin (porcine) 1,000 unit/mL injection 1,900 Units  1,900 Units InterCATHeter DIALYSIS PRN    hydrALAZINE (APRESOLINE) 20 mg/mL injection 20 mg  20 mg IntraVENous Q6H PRN    hydrALAZINE (APRESOLINE) 20 mg/mL injection 10 mg  10 mg IntraVENous Q6H PRN    ranolazine ER (RANEXA) tablet 1,000 mg  1,000 mg Oral BID    hydrALAZINE (APRESOLINE) tablet 100 mg  100 mg Oral TID    isosorbide mononitrate ER (IMDUR) tablet 30 mg  30 mg Oral DAILY    tafamidis cap 61 mg (Patient Supplied)  61 mg Oral DAILY    ondansetron (ZOFRAN) injection 4 mg  4 mg IntraVENous Q4H PRN    epoetin amalia-epbx (RETACRIT) injection 20,000 Units  20,000 Units SubCUTAneous Q TUE, THU & SAT    zinc oxide-cod liver oil (DESITIN) 40 % paste   Topical Q12H    sodium chloride (NS) flush 5-10 mL 5-10 mL IntraVENous PRN    sodium chloride (NS) flush 5-40 mL  5-40 mL IntraVENous Q8H    sodium chloride (NS) flush 5-40 mL  5-40 mL IntraVENous PRN    acetaminophen (TYLENOL) tablet 650 mg  650 mg Oral Q6H PRN    Or    acetaminophen (TYLENOL) suppository 650 mg  650 mg Rectal Q6H PRN    albuterol-ipratropium (DUO-NEB) 2.5 MG-0.5 MG/3 ML  3 mL Nebulization Q6H PRN    Warfarin - Pharmacy to Dose   Other Rx Dosing/Monitoring    sodium chloride (NS) flush 5-40 mL  5-40 mL IntraVENous Q8H    sodium chloride (NS) flush 5-40 mL  5-40 mL IntraVENous PRN    nystatin (MYCOSTATIN) 100,000 unit/gram powder   Topical PRN   ______________________________________________________________________  EXPECTED LENGTH OF STAY: 4d 21h  ACTUAL LENGTH OF STAY:          8               Yohannes Liu MD

## 2021-05-19 NOTE — PROGRESS NOTES
[facility]   [address] [facility], [address] [place] [address], [facility]  Phone: [phone]   [phone]       Nephrology Progress Note  Ki Hsu     1950     [id number]  Date of Admission : 5/11/2021 05/19/21    CC: Follow up for ROCIO    Assessment and Plan   ROCIO on CKD:  - 2/2 progressive CKD and CRS  - dialysis initiated 4/26/21. Has RIJ permacath for access. - HD TTS    Sepsis :  UTI :  + for candida  Blood cx from Permacath : negative so far      Acute Hypoxic Resp failure :  - baseline severe COPD + chronic CHF + morbid obesity/ JED   - Pulm HTN   - extubated 5/13     Chronic systolic CHF, stage C NYHA class IV  Combined ischemic and nonischemic cardiomyopathy  S/p HM 2 LVAD implant 4/15/2017 by Dr. Cathy Cabrera at [facility]  hx of recurrent VT : off mexiletine  chronic RV failure. 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 - per AHF team      Chronic anemia :   ATTR amyloidosis  - anemia of chronic disease   - continue HEIKE  MWF     Chronic A. fib. History of endometrial Ca       Interval History:  Seen and examined. Confusion seems better this AM.  No cp, sob reported. Tolerated HD    Review of Systems: Pertinent items are noted in HPI.     Current Medications:   Current Facility-Administered Medications   Medication Dose Route Frequency    warfarin - HOLD 5/19   Other ONCE    PARoxetine (PAXIL) tablet 20 mg  20 mg Oral DAILY    hydrocortisone (CORTAID) 1 % cream   Topical PRN    senna-docusate (PERICOLACE) 8.6-50 mg per tablet 1 Tab  1 Tablet Oral DAILY    glucose chewable tablet 16 g  4 Tablet Oral PRN    dextrose (D50W) injection syrg 12.5-25 g  25-50 mL IntraVENous PRN    glucagon (GLUCAGEN) injection 1 mg  1 mg IntraMUSCular PRN    insulin lispro (HUMALOG) injection   SubCUTAneous AC&HS    famotidine (PEPCID) tablet 20 mg  20 mg Oral DAILY    benzocaine-zinc cl-benzalkonium cl (ORAJEL) 20-0.1-0.02 % mucosal gel   Oral PRN    magic mouthwash cpd (without sucralfate)  5 mL Oral DAILY PRN    anidulafungin (ERAXIS) 100 mg in 0.9% sodium chloride 130 mL IVPB  100 mg IntraVENous Q24H    balsam peru-castor oiL (VENELEX) ointment   Topical BID    albumin human 25% (BUMINATE) solution 50 g  50 g IntraVENous DIALYSIS PRN    heparin (porcine) 1,000 unit/mL injection 1,900 Units  1,900 Units InterCATHeter DIALYSIS PRN    And    heparin (porcine) 1,000 unit/mL injection 1,900 Units  1,900 Units InterCATHeter DIALYSIS PRN    hydrALAZINE (APRESOLINE) 20 mg/mL injection 20 mg  20 mg IntraVENous Q6H PRN    hydrALAZINE (APRESOLINE) 20 mg/mL injection 10 mg  10 mg IntraVENous Q6H PRN    ranolazine ER (RANEXA) tablet 1,000 mg  1,000 mg Oral BID    hydrALAZINE (APRESOLINE) tablet 100 mg  100 mg Oral TID    isosorbide mononitrate ER (IMDUR) tablet 30 mg  30 mg Oral DAILY    tafamidis cap 61 mg (Patient Supplied)  61 mg Oral DAILY    ondansetron (ZOFRAN) injection 4 mg  4 mg IntraVENous Q4H PRN    epoetin amalia-epbx (RETACRIT) injection 20,000 Units  20,000 Units SubCUTAneous Q TUE, THU & SAT    zinc oxide-cod liver oil (DESITIN) 40 % paste   Topical Q12H    sodium chloride (NS) flush 5-10 mL  5-10 mL IntraVENous PRN    sodium chloride (NS) flush 5-40 mL  5-40 mL IntraVENous Q8H    sodium chloride (NS) flush 5-40 mL  5-40 mL IntraVENous PRN    acetaminophen (TYLENOL) tablet 650 mg  650 mg Oral Q6H PRN    Or    acetaminophen (TYLENOL) suppository 650 mg  650 mg Rectal Q6H PRN    albuterol-ipratropium (DUO-NEB) 2.5 MG-0.5 MG/3 ML  3 mL Nebulization Q6H PRN    Warfarin - Pharmacy to Dose   Other Rx Dosing/Monitoring    sodium chloride (NS) flush 5-40 mL  5-40 mL IntraVENous Q8H    sodium chloride (NS) flush 5-40 mL  5-40 mL IntraVENous PRN    nystatin (MYCOSTATIN) 100,000 unit/gram powder   Topical PRN      Allergies   Allergen Reactions    Benzodiazepines Other (comments)     Respiratory issues       Objective:  Vitals:    Vitals:    05/18/21 2050 05/18/21 2330 05/19/21 0445 05/19/21 0800   Pulse: 92 87 85 78   Resp: 28 25 23 20   Temp: 99 °F (37.2 °C) 98.3 °F (36.8 °C) 97.4 °F (36.3 °C) 97.7 °F (36.5 °C)   TempSrc:       SpO2: 93% 94% 95% 96%   Weight:   108.3 kg (238 lb 12.1 oz)    Height:         Intake and Output:  No intake/output data recorded. 05/17 1901 - 05/19 0700  In: 65 [P.O.:960; I.V.:590]  Out: 2000     Physical Examination:    General: Obese   HEENT            NAD, pleasant  Neck:  permacath +  Resp:  Lungs clear B/L, no wheezing   CV:  RRR,  S1,S2  GI:  Soft, NT, + BS  Neurologic:  AAO X 3   Psych:             Normal affect and mood       []    High complexity decision making was performed  []    Patient is at high-risk of decompensation with multiple organ involvement    Lab Data Personally Reviewed: I have reviewed all the pertinent labs, microbiology data and radiology studies during assessment.     Recent Labs     05/19/21 0454 05/19/21 0453 05/18/21 0354 05/17/21 0432     --  135* 136   K 3.9  --  4.0 4.2     --  101 101   CO2 27  --  23 26   *  --  130* 111*   BUN 27*  --  51* 41*   CREA 3.86*  --  5.74* 4.88*   CA 8.8  --  8.7 8.7   MG  --  2.4 2.5* 2.4   PHOS  --  3.6 5.0* 3.9   ALB 3.0*  --  3.2* 3.1*   ALT 8*  --  7* 9*   INR  --  3.9* 4.2* 4.1*     Recent Labs     05/19/21 0454 05/18/21 0354 05/17/21 0432   WBC 3.5* 3.7 3.7   HGB 9.1* 9.0* 8.6*   HCT 31.2* 30.0* 28.3*   * 139* 127*     No results found for: SDES  Lab Results   Component Value Date/Time    Culture result: CANDIDA ALBICANS (A) 05/13/2021 11:29 AM    Culture result: RARE DIPHTHEROIDS (A) 05/12/2021 04:29 AM    Culture result: LIGHT YEAST, (APPARENT CANDIDA ALBICANS) (A) 05/11/2021 07:09 PM    Culture result: LIGHT NORMAL RESPIRATORY BETO 05/11/2021 07:09 PM    Culture result: NO GROWTH 5 DAYS 05/11/2021 04:30 PM     Recent Results (from the past 24 hour(s))   GLUCOSE, POC    Collection Time: 05/18/21 11:27 AM   Result Value Ref Range Glucose (POC) 170 (H) 65 - 117 mg/dL    Performed by [facility]  PCT    GLUCOSE, POC    Collection Time: 05/18/21  4:17 PM   Result Value Ref Range    Glucose (POC) 129 (H) 65 - 117 mg/dL    Performed by [facility] [place]    GLUCOSE, POC    Collection Time: 05/18/21  9:22 PM   Result Value Ref Range    Glucose (POC) 131 (H) 65 - 117 mg/dL    Performed by Jamar HAJI    MAGNESIUM    Collection Time: 05/19/21  4:53 AM   Result Value Ref Range    Magnesium 2.4 1.6 - 2.4 mg/dL   PHOSPHORUS    Collection Time: 05/19/21  4:53 AM   Result Value Ref Range    Phosphorus 3.6 2.6 - 4.7 MG/DL   PROTHROMBIN TIME + INR    Collection Time: 05/19/21  4:53 AM   Result Value Ref Range    INR 3.9 (H) 0.9 - 1.1      Prothrombin time 38.2 (H) 9.0 - 11.1 sec   PROCALCITONIN    Collection Time: 05/19/21  4:54 AM   Result Value Ref Range    Procalcitonin 1.08 ng/mL   LD    Collection Time: 05/19/21  4:54 AM   Result Value Ref Range     81 - 246 U/L   CBC WITH AUTOMATED DIFF    Collection Time: 05/19/21  4:54 AM   Result Value Ref Range    WBC 3.5 (L) 3.6 - 11.0 K/uL    RBC 3.38 (L) 3.80 - 5.20 M/uL    HGB 9.1 (L) 11.5 - 16.0 g/dL    HCT 31.2 (L) 35.0 - 47.0 %    MCV 92.3 80.0 - 99.0 FL    MCH 26.9 26.0 - 34.0 PG    MCHC 29.2 (L) 30.0 - 36.5 g/dL    RDW 20.1 (H) 11.5 - 14.5 %    PLATELET 312 (L) 882 - 400 K/uL    MPV 9.5 8.9 - 12.9 FL    NRBC 0.0 0  WBC    ABSOLUTE NRBC 0.00 0.00 - 0.01 K/uL    NEUTROPHILS 73 32 - 75 %    LYMPHOCYTES 11 (L) 12 - 49 %    MONOCYTES 13 5 - 13 %    EOSINOPHILS 0 0 - 7 %    BASOPHILS 1 0 - 1 %    IMMATURE GRANULOCYTES 2 (H) 0.0 - 0.5 %    ABS. NEUTROPHILS 2.5 1.8 - 8.0 K/UL    ABS. LYMPHOCYTES 0.4 (L) 0.8 - 3.5 K/UL    ABS. MONOCYTES 0.5 0.0 - 1.0 K/UL    ABS. EOSINOPHILS 0.0 0.0 - 0.4 K/UL    ABS. BASOPHILS 0.0 0.0 - 0.1 K/UL    ABS. IMM.  GRANS. 0.1 (H) 0.00 - 0.04 K/UL    DF SMEAR SCANNED      RBC COMMENTS OVALOCYTES  PRESENT        RBC COMMENTS CHIARA CELLS  PRESENT        RBC COMMENTS ANISOCYTOSIS  2+       METABOLIC PANEL, COMPREHENSIVE    Collection Time: 05/19/21  4:54 AM   Result Value Ref Range    Sodium 139 136 - 145 mmol/L    Potassium 3.9 3.5 - 5.1 mmol/L    Chloride 104 97 - 108 mmol/L    CO2 27 21 - 32 mmol/L    Anion gap 8 5 - 15 mmol/L    Glucose 116 (H) 65 - 100 mg/dL    BUN 27 (H) 6 - 20 MG/DL    Creatinine 3.86 (H) 0.55 - 1.02 MG/DL    BUN/Creatinine ratio 7 (L) 12 - 20      GFR est AA 14 (L) >60 ml/min/1.73m2    GFR est non-AA 12 (L) >60 ml/min/1.73m2    Calcium 8.8 8.5 - 10.1 MG/DL    Bilirubin, total 0.7 0.2 - 1.0 MG/DL    ALT (SGPT) 8 (L) 12 - 78 U/L    AST (SGOT) 14 (L) 15 - 37 U/L    Alk. phosphatase 48 45 - 117 U/L    Protein, total 7.3 6.4 - 8.2 g/dL    Albumin 3.0 (L) 3.5 - 5.0 g/dL    Globulin 4.3 (H) 2.0 - 4.0 g/dL    A-G Ratio 0.7 (L) 1.1 - 2.2     HEP B SURFACE AG    Collection Time: 05/19/21  4:54 AM   Result Value Ref Range    Hepatitis B surface Ag <0.10 Index    Hep B surface Ag Interp. Negative NEG     GLUCOSE, POC    Collection Time: 05/19/21  7:35 AM   Result Value Ref Range    Glucose (POC) 108 65 - 117 mg/dL    Performed by Sid Rivera            Total time spent with patient:  xxx   min. Care Plan discussed with:  Patient     Family      RN      Consulting Physician [id number] [facility],         I have reviewed the flowsheets. Chart and Pertinent Notes have been reviewed. No change in PMH ,family and social history from Consult note.       Willy Dowd MD

## 2021-05-19 NOTE — PROGRESS NOTES
Problem: Mobility Impaired (Adult and Pediatric)  Goal: *Acute Goals and Plan of Care (Insert Text)  Description: FUNCTIONAL STATUS PRIOR TO ADMISSION: Patient was modified independent using a rollator for functional mobility. HOME SUPPORT PRIOR TO ADMISSION: The patient lived alone with her daughter to provide assistance as needed. Recent prolonged admission to [facility], discharged to rehab then readmitted from rehab. Physical Therapy Goals  Initiated 5/16/2021  1. Patient will move from supine to sit and sit to supine  and scoot up and down in bed with minimal assistance/contact guard assist within 7 day(s). 2.  Patient will transfer from bed to chair and chair to bed with supervision/set-up using the least restrictive device within 7 day(s). 3.  Patient will perform sit to stand with supervision/set-up within 7 day(s). 4.  Patient will ambulate with supervision/set-up for 50 feet with the least restrictive device within 7 day(s). 5.  Patient will ascend/descend 1 stairs without handrail(s) with supervision/set-up within 7 day(s). Outcome: Progressing Towards Goal    PHYSICAL THERAPY TREATMENT  Patient: Teddy Jernigan (89 y.o. female)  Date: 5/19/2021  Diagnosis: Acute encephalopathy [G93.40] Acute encephalopathy       Precautions: Fall (prior LVAD)  Chart, physical therapy assessment, plan of care and goals were reviewed. ASSESSMENT  Patient continues with skilled PT services and is progressing towards goals. Pt reports feeling tired today with in any movement. Pt required increase time. Pt reports unable to ambulate but agreeable to mobilize to the chair. Pt required v.c to assist with change over to batteries. Current Level of Function Impacting Discharge (mobility/balance): Ax2    Other factors to consider for discharge: decrease mobility, increase assist level          PLAN :  Patient continues to benefit from skilled intervention to address the above impairments.   Continue treatment per established plan of care. to address goals. Recommendation for discharge: (in order for the patient to meet his/her long term goals)  Therapy 3 hours per day 5-7 days per week if declined then HHPT with 24/7 assistance. This discharge recommendation:  Has not yet been discussed the attending provider and/or case management    IF patient discharges home will need the following DME: patient owns DME required for discharge       SUBJECTIVE:   Patient stated I am just going to the chair.     OBJECTIVE DATA SUMMARY:   Critical Behavior:  Neurologic State: Alert  Orientation Level: Oriented X4  Cognition: Memory loss  Safety/Judgement: Decreased insight into deficits, Decreased awareness of need for safety, Decreased awareness of need for assistance  Functional Mobility Training:  Bed Mobility:     Supine to Sit: Minimum assistance              Transfers:  Sit to Stand: Minimum assistance; Additional time;Assist x2  Stand to Sit: Minimum assistance (decrease control)                             Balance:  Sitting: Impaired  Sitting - Static: Fair (occasional)  Sitting - Dynamic: Fair (occasional)  Standing: Impaired  Standing - Static: Fair  Standing - Dynamic : Fair  Ambulation/Gait Training:  Distance (ft): 3 Feet (ft)  Assistive Device: Gait belt;Walker, rollator  Ambulation - Level of Assistance: Minimal assistance;Assist x2        Gait Abnormalities: Decreased step clearance        Base of Support: Widened        Step Length: Left shortened;Right shortened                Stairs: Therapeutic Exercises:     Pain Rating:  No complaints    Activity Tolerance:   Limited     After treatment patient left in no apparent distress:   Sitting in chair, Call bell within reach, and Bed / chair alarm activated    COMMUNICATION/COLLABORATION:   The patients plan of care was discussed with: Registered nurse.      Janelle Arreola PTA   Time Calculation: 27 mins

## 2021-05-19 NOTE — PROGRESS NOTES
Chart reviewed and attempted to see patient x 3 this morning. First attempt, patient requested to eat breakfast (1015). Second attempt, patient still not able to eat breakfast due to multiple practitioners entering room; RN also reporting patient needs breathing treatment (1038). Third attempt, patient stated she is unable to finish breakfast and needs to take care of herself before learning to \"put cans in the cabinet\". Daughter reports in rehab, patient has done this activity several times, thus equating OT to the disciple that teachers her to put cans in a cabinet. Patient requested to hold OT today until she is \"feeling better\" and that \"today is not the day\" (1130). Will follow up as able. Thank you.

## 2021-05-19 NOTE — PROGRESS NOTES
Physician Progress Note      PATIENTRogparris Friedman  CSN #:                  [id number]  :                       1950  ADMIT DATE:       2021 1:26 AM  DISCH DATE:  RESPONDING  PROVIDER #:        CECY MCCLELLAN MD          QUERY TEXT:    Pt admitted with respiratory failure . Pt noted to have UTI and cardiology noting urosepsis  on . If possible, please document in the progress notes and discharge summary if you are evaluating and /or treating any of the following: The medical record reflects the following:  Risk Factors: UTI  Clinical Indicators:  -cardiology note  Likely urosepsis    -nephrology  Sepsis :  UTI :  follow up Cx results. abx per primary team  [facility]    2021 02:23  WBC: 11.4 (H)    2021 05:56  NEUTROPHILS: 79 (H)    2021 02:23  NEUTROPHILS: 87 (H)    2021 02:17  Lactic acid: 2.2 (HH)    admission VS  Pulse 99  Temp (!) 101 ? F (38.3 ? C)  Resp 18  SpO2 98%    ED  Sepsis with acute hypoxic respiratory failure without septic shock, due to unspecified organism [facility])    Treatment: critical care monitoring, vancomycin IV, levaquin IV, 500 cc fluid bolus, levophed      Thank you,  Amanda Calderón RN, CDI, [place], CCDS  Certified Clinical   [phone]  Options provided:  -- Sepsis, present on admission  -- Sepsis, present on admission with shock  -- Sepsis, present on admission due to permacath  -- Sepsis, present on admission due to UTI  -- Sepsis, not present on admission  -- No Sepsis, UTI only  -- Sepsis was ruled out  -- Other - I will add my own diagnosis  -- Disagree - Not applicable / Not valid  -- Disagree - Clinically unable to determine / Unknown  -- Refer to Clinical Documentation Reviewer    PROVIDER RESPONSE TEXT:    This patient has sepsis which was present on admission.     Query created by: Pooja Iverson on 2021 12:16 PM      QUERY TEXT:    Patient admitted with respiratory failure . Noted documentation of  chronic systolic CHF and also documentation of acute on chronic systolic CHF. If possible, please document in progress notes and discharge summary if you are evaluating and /or treating any of the following: The medical record reflects the following:  Risk Factors: HX of CHF  Clinical Indicators:  5/15 ICU  Problem List:  Acute on chronic systolic CHF (congestive heart failure)    5/14 cardiology  IMPRESSION:  Chronic systolic heart failure  ? Stage C, NYHA class IV improved to IIIA symptoms?with LVAD  5/18 PN  Chronic sCHF: BiVentricular , s/p LVAD, s/p ICD. ?  - Stage C, NYHA class IV improved to IIIA symptoms?with LVAD    Treatment: cardiology consult, isobide  Thank you,  Pedro Hooks RN, CDI, [facility], [facility]  Certified Clinical   [phone]  Options provided:  -- Acute on chronic systolic CHF  confirmed and chronic systolic CHF only  ruled out  -- Chronic systolic CHF  confirmed and acute systolic CHF  ruled out  -- Other - I will add my own diagnosis  -- Disagree - Not applicable / Not valid  -- Disagree - Clinically unable to determine / Unknown  -- Refer to Clinical Documentation Reviewer    PROVIDER RESPONSE TEXT:    After study, chronic systolic CHF confirmed and acute CHF ruled out. Query created by: Kristina Hamm on 5/19/2021 12:26 PM      QUERY TEXT:    Pt admitted with respiratory failure   Noted documentation of severe protein calorie malnutrition by nutritional consultant. t. If possible, please document in progress notes and discharge summary:    The medical record reflects the following:  Risk Factors: muscle wasting  Clinical Indicators:  5/14-nutritional consult  Severe wt loss of 20% x 6 months. Some likely d/t fluid losses with start of HD but also fair intake and muscle wasting. LE muscle wasting observed.     Malnutrition Assessment:  Malnutrition Status:  Severe malnutrition  Context:  Chronic illness  Findings of the 6 clinical characteristics of malnutrition:  Energy Intake:  7 - 75% or less est energy requirements for 1 month or longer  Weight Loss:  7.00 - Greater than 10% over 6 months  Body Fat Loss:  Unable to assess,  Muscle Mass Loss:  1 - Mild muscle mass loss, Calf (gastrocnemius)  Fluid Accumulation:  Unable to assess,   Strength:      Treatment: nutritional consult, clear liquid diet    Thank you,  Brisa Keating RN, CDI, [facility], [facility]  Certified Clinical   [phone]  Options provided:  -- severe protein calorie malnutrition  confirmed present on admission  -- Defer to nutritional  consultant documentation regarding severe protein calorie malnutrition  -- Other - I will add my own diagnosis  -- Disagree - Not applicable / Not valid  -- Disagree - Clinically unable to determine / Unknown  -- Refer to Clinical Documentation Reviewer    PROVIDER RESPONSE TEXT:    I defer to nutritional consultant regarding documentation of severe protein calorie malnutrition .     Query created by: Connor Mortimer on 5/19/2021 12:36 PM      Electronically signed by:  CECY PERKINS [facility] MD 5/19/2021 1:20 PM

## 2021-05-19 NOTE — PROGRESS NOTES
ID Progress Note  2021    Subjective:     Feeling better overall    Objective:     Antibiotics:  1. Vancomycin   2. Eraxis   3. Zosyn       Vitals:   Visit Vitals  /80 Comment: treatment ended. Blood returned   Pulse 75   Temp 98.3 °F (36.8 °C)   Resp 18   Ht 5' 7\" (1.702 m)   Wt 108.3 kg (238 lb 12.1 oz)   SpO2 98%   BMI 37.39 kg/m²        Tmax:  Temp (24hrs), Av.2 °F (36.8 °C), Min:97.4 °F (36.3 °C), Max:99 °F (37.2 °C)      Exam:  Lungs:  clear to auscultation bilaterally  Heart:  regular rate and rhythm  Abdomen:  soft, non-tender. Bowel sounds normal. No masses,  no organomegaly    Labs:      Recent Labs     21  0454 21  0354 21  0432   WBC 3.5* 3.7 3.7   HGB 9.1* 9.0* 8.6*   * 139* 127*   BUN 27* 51* 41*   CREA 3.86* 5.74* 4.88*   AP 48 59 46   TBILI 0.7 0.7 0.8       Cultures:     No results found for: SDES  Lab Results   Component Value Date/Time    Culture result: CANDIDA ALBICANS (A) 2021 11:29 AM    Culture result: RARE DIPHTHEROIDS (A) 2021 04:29 AM    Culture result: LIGHT YEAST, (APPARENT CANDIDA ALBICANS) (A) 2021 07:09 PM    Culture result: LIGHT NORMAL RESPIRATORY BETO 2021 07:09 PM       Radiology:     Line/Insert Date:           Assessment:     1. Chronic SWI  2. Candida UTI--can treat with fluconazole if able  3. LVAD infection     Objective:     1.  Contraindications to fluconazole--will continue Eraxis until discharge or , whichever comes first--will not need treatment past the time of discharge     Elias Rahman MD

## 2021-05-19 NOTE — WOUND CARE
WOCN Note:      Follow assessment of buttocks, sacrum and bilateral low legs.     Assessed in room 465.     Chart reviewed. Admitted DX:  Acute encephalopathy       Past Medical History:   Diagnosis Date    Asthma      Cancer ([facility]       breast    Cancer ([facility]       endometrial    Congestive heart failure, unspecified      CRI (chronic renal insufficiency)      Depression      Diabetes ([facility] [facility]      Hypertension        Assessment:   Patient is alert and sitting up in chair. She requires assist of 2 with ambulation. Bed: foam mattress  Heels intact without erythema. Sacrum intact without erythema.     1.  Bilateral buttocks have blanching red/purple erythema. Applied Venelex. Provided waffle cushion. 2.  Bilateral low legs dry scabs. 3.  Groin and abdominal folds:  Resolving MASD. Wound, Pressure Prevention & Skin Care Recommendations:    1. Minimize layers of linen/pads under patient to optimize support surface. 2.  Turn/reposition approximately every 2 hours and offload heels. 3.  Manage moisture/ Keep skin folds clean and dry. 4.  Groin and abdominal folds:  Cleanse, dry and apply Desitin Zinc cream.  5.  Buttocks:  Venelex TID.     6.  Use waffle cushion when seated.     Discussed above plan with Mayuri Vides RN.     Transition of Care: Plan to follow as needed while admitted to hospital.     Rosendo Olszewski, BSN [facility]  Available on [employer]  Pager [phone]  Office [phone]

## 2021-05-19 NOTE — PROGRESS NOTES
1530 Bedside and Verbal shift change report given to Ann Christine RN (oncoming nurse) by Norma Ceballos RN (offgoing nurse). Report included the following information SBAR, Kardex, Intake/Output, MAR, Recent Results, Med Rec Status and Cardiac Rhythm V Paced. 2020 Bedside and Verbal shift change report given to Radha West RN (oncoming nurse) by Ann Christine RN (offgoing nurse). Report included the following information SBAR, Kardex, Intake/Output, MAR, Recent Results, Med Rec Status and Cardiac Rhythm V Paced.

## 2021-05-19 NOTE — PROGRESS NOTES
Transitions of Care Plan:  RUR: 39%  Clinical Plan: progress with therapy; discharge soon; HD MWF  Consults: Nephrology; [facility]; Therapy  Baseline: ambulates with RW; resides alone; prev with [facility]  Disposition: Home with [address]; HD set up at [facility]    Patient has clinically progressed - now in stepdown unit. HD is MWF. Therapy on board. CM spoke with [facility] - patient will likely discharge home with home health; HD will be at [facility] (high acuity clinic). CM spoke with patient with intent to follow up with daughter, Yarely Streeter, when available. Patient agrees discharge plan is home to [place], [place] and would like same [facility] nurse to see her at home. CM will request updated home health orders from [facility]. Outpatient HD - patient needs high acuity clinic - [facility] - due to LVAD management. CM sent update to Shay[facility] Holley and spoke with admissions with request to callback if they need updated information now that patient will be transferring outpatient clinics. CM to confirm arrangements for personal care aides and transportation for patient to/from HD now that she will discharge to [place]. Daughter resides in [place], [place]. CM will continue to follow.     Carol Bustillos, MPH  Care Manager l [employer]  Available via [address] [place] [address] or

## 2021-05-19 NOTE — PROGRESS NOTES
Problem: Pressure Injury - Risk of  Goal: *Prevention of pressure injury  Description: Document Jaime Scale and appropriate interventions in the flowsheet. Outcome: Progressing Towards Goal  Note: Pressure Injury Interventions:  Sensory Interventions: Assess changes in LOC, Check visual cues for pain, Discuss PT/OT consult with provider    Moisture Interventions: Absorbent underpads, Apply protective barrier, creams and emollients, Check for incontinence Q2 hours and as needed, Minimize layers    Activity Interventions: Increase time out of bed, PT/OT evaluation    Mobility Interventions: HOB 30 degrees or less, PT/OT evaluation    Nutrition Interventions: Document food/fluid/supplement intake, Discuss nutritional consult with provider, Offer support with meals,snacks and hydration    Friction and Shear Interventions: Apply protective barrier, creams and emollients, Lift team/patient mobility team    Problem: Patient Education: Go to Patient Education Activity  Goal: Patient/Family Education  Outcome: Progressing Towards Goal     Problem: Falls - Risk of  Goal: *Absence of Falls  Description: Document Shonda Pacheco Fall Risk and appropriate interventions in the flowsheet.   Outcome: Progressing Towards Goal  Note: Fall Risk Interventions:  Mobility Interventions: Communicate number of staff needed for ambulation/transfer, Patient to call before getting OOB, Utilize gait belt for transfers/ambulation    Mentation Interventions: Adequate sleep, hydration, pain control, Door open when patient unattended, Eyeglasses and hearing aids, Family/sitter at bedside, Room close to nurse's station, Update white board    Medication Interventions: Evaluate medications/consider consulting pharmacy, Patient to call before getting OOB, Utilize gait belt for transfers/ambulation    Elimination Interventions: Call light in reach, Toileting schedule/hourly rounds    History of Falls Interventions: Bed/chair exit alarm, Consult care management for discharge planning, Door open when patient unattended, Evaluate medications/consider consulting pharmacy, Room close to nurse's station, Utilize gait belt for transfer/ambulation    Problem: Patient Education: Go to Patient Education Activity  Goal: Patient/Family Education  Outcome: Progressing Towards Goal     Problem: Nutrition Deficit  Goal: *Optimize nutritional status  Outcome: Progressing Towards Goal

## 2021-05-20 NOTE — PROGRESS NOTES
Problem: Mobility Impaired (Adult and Pediatric)  Goal: *Acute Goals and Plan of Care (Insert Text)  Description: FUNCTIONAL STATUS PRIOR TO ADMISSION: Patient was modified independent using a rollator for functional mobility. HOME SUPPORT PRIOR TO ADMISSION: The patient lived alone with her daughter to provide assistance as needed. Recent prolonged admission to [facility], discharged to rehab then readmitted from rehab. Physical Therapy Goals  Initiated 5/16/2021  1. Patient will move from supine to sit and sit to supine  and scoot up and down in bed with minimal assistance/contact guard assist within 7 day(s). 2.  Patient will transfer from bed to chair and chair to bed with supervision/set-up using the least restrictive device within 7 day(s). 3.  Patient will perform sit to stand with supervision/set-up within 7 day(s). 4.  Patient will ambulate with supervision/set-up for 50 feet with the least restrictive device within 7 day(s). 5.  Patient will ascend/descend 1 stairs without handrail(s) with supervision/set-up within 7 day(s). Outcome: Progressing Towards Goal    PHYSICAL THERAPY TREATMENT  Patient: Estella Oneill (29 y.o. female)  Date: 5/20/2021  Diagnosis: Acute encephalopathy [G93.40] Acute encephalopathy       Precautions: Fall (prior LVAD)  Chart, physical therapy assessment, plan of care and goals were reviewed. ASSESSMENT  Patient continues with skilled PT services and is progressing towards goals. RN requesting assistance to get pt to [facility]. Pt was able to transfer with assistance of two. Pt reports fatigue with mobility. Pt tolerated a short distance to post BSC. Decrease safety with turning to chair. Will continue to progress as able.   .     Current Level of Function Impacting Discharge (mobility/balance): min A x2    Other factors to consider for discharge: decrease mobility, assist level         PLAN :  Patient continues to benefit from skilled intervention to address the above impairments. Continue treatment per established plan of care. to address goals. Recommendation for discharge: (in order for the patient to meet his/her long term goals)  inpt rehab if able and agreeable. If not HHPT with assistance     This discharge recommendation:  Has not yet been discussed the attending provider and/or case management    IF patient discharges home will need the following DME: patient owns DME required for discharge       SUBJECTIVE:   Patient stated Are you ready.     OBJECTIVE DATA SUMMARY:   Critical Behavior:  Neurologic State: Alert  Orientation Level: Oriented X4  Cognition: Memory loss  Safety/Judgement: Decreased insight into deficits, Decreased awareness of need for safety, Decreased awareness of need for assistance  Functional Mobility Training:  Bed Mobility:     Supine to Sit: Minimum assistance              Transfers:  Sit to Stand: Minimum assistance;Assist x2 (mod  A)  Stand to Sit: Minimum assistance;Assist x2 (decrease control)        Bed to Chair: Minimum assistance; Additional time;Assist x2 (rollator)                    Balance:  Sitting: Impaired  Sitting - Static: Fair (occasional)  Sitting - Dynamic: Fair (occasional)  Standing: Impaired  Standing - Static: Fair  Standing - Dynamic : Fair  Ambulation/Gait Training:  Distance (ft): 4 Feet (ft)  Assistive Device: Gait belt;Walker, rolling  Ambulation - Level of Assistance: Minimal assistance;Assist x2        Gait Abnormalities: Decreased step clearance        Base of Support: Widened        Step Length: Left shortened;Right shortened                   Stairs: Therapeutic Exercises:     Pain Rating:  No complaints     Activity Tolerance:   Poor and requires frequent rest breaks    After treatment patient left in no apparent distress:   Sitting in chair and Call bell within reach    COMMUNICATION/COLLABORATION:   The patients plan of care was discussed with: Registered nurseRAINER Goldsmith Calculation: 26 mins

## 2021-05-20 NOTE — PROGRESS NOTES
10:30 Called RT to request albuterol neb treatment    13:50 Notified Enrigue Bottom NP that bladder scan showed only 32mL of urine present in bladder, which may not be adequate to obtain a urine sample for UA via straight cath. Will recheck bladder later today. 19:22 Bladder scan 51 mL. 19:30 Bedside shift change report given to HARLEYοsalmaιmaricelώmarilouοblossom Gutierrez (oncoming nurse) by Thuy Tracy (offgoing nurse). Report included the following information SBAR.

## 2021-05-20 NOTE — PROGRESS NOTES
[facility]   [address] [facility], [address] [place] [address], [facility]  Phone: [phone]   [phone]       Nephrology Progress Note  Yaniv Chu     1950     [id number]  Date of Admission : 5/11/2021 05/20/21    CC: Follow up for ROCIO    Assessment and Plan   ROCIO on CKD:  - 2/2 progressive CKD and CRS  - dialysis initiated 4/26/21. Has RIJ permacath for access. - HD TTS. HD today     Sepsis :  UTI :  + for candida  Blood cx from Permacath : negative so far      Acute Hypoxic Resp failure :  - baseline severe COPD + chronic CHF + morbid obesity/ JED   - Pulm HTN   - extubated 5/13     Chronic systolic CHF, stage C NYHA class IV  Combined ischemic and nonischemic cardiomyopathy  S/p HM 2 LVAD implant 4/15/2017 by Dr. Edna Eagle at [facility]  hx of recurrent VT : off mexiletine  chronic RV failure. 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 - per AHF team      Chronic anemia :   ATTR amyloidosis  - anemia of chronic disease   - continue HEIKE  MWF     Chronic A. fib. History of endometrial Ca       Interval History:  Seen and examined on HD. Doing well. No N/V/CP/SOB    Review of Systems: Pertinent items are noted in HPI.     Current Medications:   Current Facility-Administered Medications   Medication Dose Route Frequency    artificial saliva (MOUTH KOTE) 1 Spray  1 Spray Oral PRN    cephALEXin (KEFLEX) capsule 250 mg  250 mg Oral BID    PARoxetine (PAXIL) tablet 20 mg  20 mg Oral DAILY    hydrocortisone (CORTAID) 1 % cream   Topical PRN    senna-docusate (PERICOLACE) 8.6-50 mg per tablet 1 Tab  1 Tablet Oral DAILY    glucose chewable tablet 16 g  4 Tablet Oral PRN    dextrose (D50W) injection syrg 12.5-25 g  25-50 mL IntraVENous PRN    glucagon (GLUCAGEN) injection 1 mg  1 mg IntraMUSCular PRN    insulin lispro (HUMALOG) injection   SubCUTAneous AC&HS    famotidine (PEPCID) tablet 20 mg  20 mg Oral DAILY    benzocaine-zinc cl-benzalkonium cl (ORAJEL) 20-0.1-0.02 % mucosal gel   Oral PRN    magic mouthwash cpd (without sucralfate)  5 mL Oral DAILY PRN    anidulafungin (ERAXIS) 100 mg in 0.9% sodium chloride 130 mL IVPB  100 mg IntraVENous Q24H    balsam peru-castor oiL (VENELEX) ointment   Topical BID    albumin human 25% (BUMINATE) solution 50 g  50 g IntraVENous DIALYSIS PRN    heparin (porcine) 1,000 unit/mL injection 1,900 Units  1,900 Units InterCATHeter DIALYSIS PRN    And    heparin (porcine) 1,000 unit/mL injection 1,900 Units  1,900 Units InterCATHeter DIALYSIS PRN    hydrALAZINE (APRESOLINE) 20 mg/mL injection 20 mg  20 mg IntraVENous Q6H PRN    hydrALAZINE (APRESOLINE) 20 mg/mL injection 10 mg  10 mg IntraVENous Q6H PRN    ranolazine ER (RANEXA) tablet 1,000 mg  1,000 mg Oral BID    hydrALAZINE (APRESOLINE) tablet 100 mg  100 mg Oral TID    isosorbide mononitrate ER (IMDUR) tablet 30 mg  30 mg Oral DAILY    tafamidis cap 61 mg (Patient Supplied)  61 mg Oral DAILY    ondansetron (ZOFRAN) injection 4 mg  4 mg IntraVENous Q4H PRN    epoetin amalia-epbx (RETACRIT) injection 20,000 Units  20,000 Units SubCUTAneous Q TUE, THU & SAT    zinc oxide-cod liver oil (DESITIN) 40 % paste   Topical Q12H    sodium chloride (NS) flush 5-10 mL  5-10 mL IntraVENous PRN    sodium chloride (NS) flush 5-40 mL  5-40 mL IntraVENous Q8H    sodium chloride (NS) flush 5-40 mL  5-40 mL IntraVENous PRN    acetaminophen (TYLENOL) tablet 650 mg  650 mg Oral Q6H PRN    Or    acetaminophen (TYLENOL) suppository 650 mg  650 mg Rectal Q6H PRN    albuterol-ipratropium (DUO-NEB) 2.5 MG-0.5 MG/3 ML  3 mL Nebulization Q6H PRN    Warfarin - Pharmacy to Dose   Other Rx Dosing/Monitoring    sodium chloride (NS) flush 5-40 mL  5-40 mL IntraVENous Q8H    sodium chloride (NS) flush 5-40 mL  5-40 mL IntraVENous PRN    nystatin (MYCOSTATIN) 100,000 unit/gram powder   Topical PRN      Allergies   Allergen Reactions    Benzodiazepines Other (comments)     Respiratory issues Objective:  Vitals:    Vitals:    05/20/21 0900 05/20/21 0915 05/20/21 0930 05/20/21 0945   Pulse: 84 82 85 84   Resp:   18 18   Temp:   98.3 °F (36.8 °C)    TempSrc:       SpO2:   97% 99%   Weight:       Height:         Intake and Output:  05/20 0701 - 05/20 1900  In: -   Out: 2000 05/18 1901 - 05/20 0700  In: 710 [P.O.:580; I.V.:130]  Out: 0     Physical Examination:    General: Obese   HEENT            NAD, pleasant  Neck:  permacath +  Resp:  Lungs clear B/L, no wheezing   CV:  RRR,  S1,S2  GI:  Soft, NT, + BS  Neurologic:  AAO X 3   Psych:             Normal affect and mood       []    High complexity decision making was performed  []    Patient is at high-risk of decompensation with multiple organ involvement    Lab Data Personally Reviewed: I have reviewed all the pertinent labs, microbiology data and radiology studies during assessment.     Recent Labs     05/20/21 0445 05/20/21 0443 05/19/21 0454 05/19/21 0453 05/18/21 0354   NA  --  134* 139  --  135*   K  --  4.0 3.9  --  4.0   CL  --  100 104  --  101   CO2  --  24 27  --  23   GLU  --  124* 116*  --  130*   BUN  --  35* 27*  --  51*   CREA  --  4.77* 3.86*  --  5.74*   CA  --  9.0 8.8  --  8.7   MG 2.4  --   --  2.4 2.5*   PHOS 4.2  --   --  3.6 5.0*   ALB  --  3.0* 3.0*  --  3.2*   ALT  --  9* 8*  --  7*   INR 3.2*  --   --  3.9* 4.2*     Recent Labs     05/20/21 0443 05/19/21 0454 05/18/21 0354   WBC 4.8 3.5* 3.7   HGB 9.0* 9.1* 9.0*   HCT 30.4* 31.2* 30.0*    143* 139*     No results found for: SDES  Lab Results   Component Value Date/Time    Culture result: CANDIDA ALBICANS (A) 05/13/2021 11:29 AM    Culture result: RARE DIPHTHEROIDS (A) 05/12/2021 04:29 AM    Culture result: LIGHT YEAST, (APPARENT CANDIDA ALBICANS) (A) 05/11/2021 07:09 PM    Culture result: LIGHT NORMAL RESPIRATORY BETO 05/11/2021 07:09 PM    Culture result: NO GROWTH 5 DAYS 05/11/2021 04:30 PM     Recent Results (from the past 24 hour(s))   GLUCOSE, POC Collection Time: 05/19/21 11:27 AM   Result Value Ref Range    Glucose (POC) 178 (H) 65 - 117 mg/dL    Performed by Chrissy ALEXANDER(CON)    GLUCOSE, POC    Collection Time: 05/19/21  4:20 PM   Result Value Ref Range    Glucose (POC) 119 (H) 65 - 117 mg/dL    Performed by Chrissy ALEXANDER(CON)    GLUCOSE, POC    Collection Time: 05/19/21  9:31 PM   Result Value Ref Range    Glucose (POC) 161 (H) 65 - 117 mg/dL    Performed by Suhas HAJI (CON)    PROCALCITONIN    Collection Time: 05/20/21  4:43 AM   Result Value Ref Range    Procalcitonin 0.88 ng/mL   LD    Collection Time: 05/20/21  4:43 AM   Result Value Ref Range     81 - 246 U/L   CBC WITH AUTOMATED DIFF    Collection Time: 05/20/21  4:43 AM   Result Value Ref Range    WBC 4.8 3.6 - 11.0 K/uL    RBC 3.35 (L) 3.80 - 5.20 M/uL    HGB 9.0 (L) 11.5 - 16.0 g/dL    HCT 30.4 (L) 35.0 - 47.0 %    MCV 90.7 80.0 - 99.0 FL    MCH 26.9 26.0 - 34.0 PG    MCHC 29.6 (L) 30.0 - 36.5 g/dL    RDW 20.1 (H) 11.5 - 14.5 %    PLATELET 438 915 - 096 K/uL    MPV 9.0 8.9 - 12.9 FL    NRBC 0.0 0  WBC    ABSOLUTE NRBC 0.00 0.00 - 0.01 K/uL    NEUTROPHILS 78 (H) 32 - 75 %    LYMPHOCYTES 11 (L) 12 - 49 %    MONOCYTES 8 5 - 13 %    EOSINOPHILS 0 0 - 7 %    BASOPHILS 1 0 - 1 %    IMMATURE GRANULOCYTES 2 (H) 0.0 - 0.5 %    ABS. NEUTROPHILS 3.8 1.8 - 8.0 K/UL    ABS. LYMPHOCYTES 0.5 (L) 0.8 - 3.5 K/UL    ABS. MONOCYTES 0.4 0.0 - 1.0 K/UL    ABS. EOSINOPHILS 0.0 0.0 - 0.4 K/UL    ABS. BASOPHILS 0.0 0.0 - 0.1 K/UL    ABS. IMM.  GRANS. 0.1 (H) 0.00 - 0.04 K/UL    DF SMEAR SCANNED      RBC COMMENTS ANISOCYTOSIS  1+        RBC COMMENTS POLYCHROMASIA  1+       METABOLIC PANEL, COMPREHENSIVE    Collection Time: 05/20/21  4:43 AM   Result Value Ref Range    Sodium 134 (L) 136 - 145 mmol/L    Potassium 4.0 3.5 - 5.1 mmol/L    Chloride 100 97 - 108 mmol/L    CO2 24 21 - 32 mmol/L    Anion gap 10 5 - 15 mmol/L    Glucose 124 (H) 65 - 100 mg/dL    BUN 35 (H) 6 - 20 MG/DL    Creatinine 4.77 (H) 0.55 - 1.02 MG/DL    BUN/Creatinine ratio 7 (L) 12 - 20      GFR est AA 11 (L) >60 ml/min/1.73m2    GFR est non-AA 9 (L) >60 ml/min/1.73m2    Calcium 9.0 8.5 - 10.1 MG/DL    Bilirubin, total 0.6 0.2 - 1.0 MG/DL    ALT (SGPT) 9 (L) 12 - 78 U/L    AST (SGOT) 15 15 - 37 U/L    Alk. phosphatase 52 45 - 117 U/L    Protein, total 7.5 6.4 - 8.2 g/dL    Albumin 3.0 (L) 3.5 - 5.0 g/dL    Globulin 4.5 (H) 2.0 - 4.0 g/dL    A-G Ratio 0.7 (L) 1.1 - 2.2     MAGNESIUM    Collection Time: 05/20/21  4:45 AM   Result Value Ref Range    Magnesium 2.4 1.6 - 2.4 mg/dL   PHOSPHORUS    Collection Time: 05/20/21  4:45 AM   Result Value Ref Range    Phosphorus 4.2 2.6 - 4.7 MG/DL   PROTHROMBIN TIME + INR    Collection Time: 05/20/21  4:45 AM   Result Value Ref Range    INR 3.2 (H) 0.9 - 1.1      Prothrombin time 32.0 (H) 9.0 - 11.1 sec   GLUCOSE, POC    Collection Time: 05/20/21  6:24 AM   Result Value Ref Range    Glucose (POC) 132 (H) 65 - 117 mg/dL    Performed by Sergio Polanco H (CON)            Total time spent with patient:  xxx   min. Care Plan discussed with:  Patient     Family      RN      Consulting Physician [address] [place] [address]        I have reviewed the flowsheets. Chart and Pertinent Notes have been reviewed. No change in PMH ,family and social history from Consult note.       Alessandra Babinski, MD

## 2021-05-20 NOTE — PROGRESS NOTES
Pharmacist Note  Warfarin Dosing  Consult provided for this 78 y. o.female to manage warfarin for LVAD    INR Goal: 1.8-2.5  Home regimen/ tablet size: 5 mg daily    Drugs that may increase INR: None  Drugs that may decrease INR: None  Other current anticoagulants/ drugs that may increase bleeding risk: heparin  Risk factors: Age > 65, dialysis  Daily INR ordered: YES    Recent Labs     05/20/21  0445 05/20/21  0443 05/19/21  0454 05/19/21  0453 05/18/21  0354   HGB  --  9.0* 9.1*  --  9.0*   INR 3.2*  --   --  3.9* 4.2*     Date               INR                  Dose  5/11  3.3  hold   5/12                1.9                    2 mg      5/13                 1.4                   4 mg     5/14                 1.4                   6 mg      5/15  1.9  4 mg  5/16  3.1  HOLD  5/17                 4.1                  HOLD     5/18                 4.2                  HOLD   5/19                 3.9                  HOLD   5/20  3.2  HOLD                                                                               Assessment/ Plan: Will HOLD warfarin today     Pharmacy will continue to monitor daily and adjust therapy as indicated. Please contact the pharmacist at b [phone] or  for outpatient recommendations if needed.

## 2021-05-20 NOTE — PROGRESS NOTES
1930:Bedside and Verbal shift change report given to John (oncoming nurse) by Jeanice Epley (offgoing nurse). Report included the following information SBAR, Kardex, ED Summary, Intake/Output, MAR, Accordion and Recent Results. 2145: This RN and PADILLA Turcios assisting pt from chair to bed. Pt transitioned from sitting to standing position. Pt stated her legs were giving out and she was going to fall. This RN and PADILLA Turcios assisted pt back to sitting position in chair. VSS. Pt is weak and states she is tired and will call out when feeling more ready to move to the bed.     2330: Pt states she is ready to get in the bed now. This RN and Cosme Blair RN assisting patient from chair to standing position. Cosme Blair RN calling out for more assistance getting patient back to chair and gait belt. Pt says she feels like her legs are going to give out again. Pt assisted back to chair by 4 RN's with gait belt. Pt requesting to sleep in recliner d/t feeling weak and tired. VSS.    0630: HD RN on unit to initiate dialysis. Pre-HD MAP= 78.    0730: Bedside and Verbal shift change report given to Marquis Poole (oncoming nurse) by Deisy Yoder (offgoing nurse). Report included the following information SBAR, Kardex, Intake/Output, MAR, Accordion and Recent Results.

## 2021-05-20 NOTE — PROGRESS NOTES
[facility] VAD/Heart Failure Progress Note    Admit Date: 2021  POD:  * No surgery found *    Procedure:          Subjective:   No issues overnight; good flows; abx's      Objective:   Vitals:  Blood pressure 100/80, pulse 84, temperature 98.9 °F (37.2 °C), resp. rate 20, height 5' 7\" (1.702 m), weight 237 lb 14 oz (107.9 kg), SpO2 92 %.   Temp (24hrs), Av.6 °F (37 °C), Min:98.3 °F (36.8 °C), Max:98.9 °F (37.2 °C)    Hemodynamics:   CO:     CI:     CVP: CVP (mmHg): 10 mmHg (21 0700)   SVR:     PAP Systolic:     PAP Diastolic:     PVR:     HJ07:     SCV02:      VAD Interrogation: LVAD (Heartmate)  Pump Speed (RPM): 9400  Pump Flow (LPM): 4.9  Chatter in Lines: No  PI (Pulsitility Index): 3.9  Power: 5.5  MAP: 70   Test: No  Back Up  at Bedside & Labeled: Yes  Power Module Test: No  Driveline Site Care: No  Driveline Dressing: Clean, Dry, and Intact    EKG/Rhythm:      Extubation Date / Time:     CT Output:     Ventilator:  Ventilator Volumes  Vt Set (ml): 450 ml (21 0750)  Vt Exhaled (Machine Breath) (ml): 473 ml (21 0750)  Vt Spont (ml): 465 ml (21 0133)  Ve Observed (l/min): 10.1 l/min (21 0133)    Oxygen Therapy:  Oxygen Therapy  O2 Sat (%): 92 % (21 1131)  Pulse via Oximetry: 80 beats per minute (21 1040)  O2 Device: None (Room air) (21 1040)  Skin Assessment: Clean, dry, & intact (21 2100)  Skin Protection for O2 Device: No (21 0800)  O2 Flow Rate (L/min): 1 l/min (21 2349)  O2 Temperature:  (HME) (21 0328)  FIO2 (%): 40 % (21 1200)  ETCO2 (mmHg): 36 mmHg (21 0200)    CXR:    Admission Weight: Last Weight   Weight: 269 lb 10 oz (122.3 kg) Weight: 237 lb 14 oz (107.9 kg)     Intake / Output / Drain:  Current Shift:  0701 -  1900  In: 240 [P.O.:240]  Out:    Last 24 hrs.:     Intake/Output Summary (Last 24 hours) at 2021 1558  Last data filed at 2021 0945  Gross per 24 hour   Intake 340 ml   Output 2000 ml   Net -1660 ml             No results for input(s): CPK, CKMB, TROIQ in the last 72 hours.   Recent Labs     05/20/21 0445 05/20/21 0443 05/19/21 0454 05/19/21 0453 05/18/21  0354   NA  --  134* 139  --  135*   K  --  4.0 3.9  --  4.0   CO2  --  24 27  --  23   BUN  --  35* 27*  --  51*   CREA  --  4.77* 3.86*  --  5.74*   GLU  --  124* 116*  --  130*   PHOS 4.2  --   --  3.6 5.0*   MG 2.4  --   --  2.4 2.5*   WBC  --  4.8 3.5*  --  3.7   HGB  --  9.0* 9.1*  --  9.0*   HCT  --  30.4* 31.2*  --  30.0*   PLT  --  154 143*  --  139*     Recent Labs     05/20/21 0445 05/19/21 0453 05/18/21  0354   INR 3.2* 3.9* 4.2*   PTP 32.0* 38.2* 40.9*     No lab exists for component: PBNP        Current Facility-Administered Medications:     epoetin amalia-epbx (RETACRIT) injection 20,000 Units, 20,000 Units, SubCUTAneous, Q TUE, THU & SAT, Jaspreet Mendoza MD    artificial saliva (MOUTH KOTE) 1 Spray, 1 Spray, Oral, PRN, Braxton, Triny B, NP    cephALEXin (KEFLEX) capsule 250 mg, 250 mg, Oral, BID, Braxton, Triny B, NP, 250 mg at 05/20/21 1027    PARoxetine (PAXIL) tablet 20 mg, 20 mg, Oral, DAILY, Festus Ribeiro MD, 20 mg at 05/20/21 1027    hydrocortisone (CORTAID) 1 % cream, , Topical, PRN, Festus Ribeiro MD    senna-docusate (PERICOLACE) 8.6-50 mg per tablet 1 Tab, 1 Tablet, Oral, DAILY, Braxton, Triny B, NP, 1 Tablet at 05/17/21 1237    glucose chewable tablet 16 g, 4 Tablet, Oral, PRN, Elda Pineda MD    dextrose (D50W) injection syrg 12.5-25 g, 25-50 mL, IntraVENous, PRN, Elda Pineda MD    glucagon [facility]) injection 1 mg, 1 mg, IntraMUSCular, PRN, Elda Pineda MD    insulin lispro (HUMALOG) injection, , SubCUTAneous, AC&HS, Raina Flores MD, 2 Units at 05/20/21 1152    famotidine (PEPCID) tablet 20 mg, 20 mg, Oral, DAILY, Dirk Cockayne, MD, 20 mg at 05/20/21 1027    benzocaine-zinc cl-benzalkonium cl (ORAJEL) 20-0.1-0.02 % mucosal gel, , Oral, PRN, Kenzie Bishop MD    magic mouthwash cpd (without sucralfate), 5 mL, Oral, DAILY PRN, Kenzie Bishop MD, 5 mL at 05/19/21 1507    anidulafungin (ERAXIS) 100 mg in 0.9% sodium chloride 130 mL IVPB, 100 mg, IntraVENous, Q24H, Eyrn Gallagher MD, 100 mg at 05/19/21 1852    balsam peru-castor oiL (VENELEX) ointment, , Topical, BID, Kenzie Bishop MD, Given at 05/20/21 1028    albumin human 25% (BUMINATE) solution 50 g, 50 g, IntraVENous, DIALYSIS PRN, Jaspreet Mendoza MD    heparin (porcine) 1,000 unit/mL injection 1,900 Units, 1,900 Units, InterCATHeter, DIALYSIS PRN, 1,900 Units at 05/18/21 1522 **AND** heparin (porcine) 1,000 unit/mL injection 1,900 Units, 1,900 Units, InterCATHeter, DIALYSIS PRN, Jaspreet Mendoza MD, 1,900 Units at 05/18/21 1521    hydrALAZINE (APRESOLINE) 20 mg/mL injection 20 mg, 20 mg, IntraVENous, Q6H PRN, Braxton, Triny B, NP    hydrALAZINE (APRESOLINE) 20 mg/mL injection 10 mg, 10 mg, IntraVENous, Q6H PRN, Braxton, Triny B, NP, 10 mg at 05/13/21 1052    ranolazine ER (RANEXA) tablet 1,000 mg, 1,000 mg, Oral, BID, Braxton, Triny B, NP, 1,000 mg at 05/20/21 1027    hydrALAZINE (APRESOLINE) tablet 100 mg, 100 mg, Oral, TID, Braxton, Triny B, NP, 100 mg at 05/20/21 1027    isosorbide mononitrate ER (IMDUR) tablet 30 mg, 30 mg, Oral, DAILY, Braxton, Triny B, NP, 30 mg at 05/20/21 1027    tafamidis cap 61 mg (Patient Supplied), 61 mg, Oral, DAILY, Kenzie Bishop MD, 61 mg at 05/20/21 1030    ondansetron (ZOFRAN) injection 4 mg, 4 mg, IntraVENous, Q4H PRN, Valeriy Morrow MD    zinc oxide-cod liver oil (DESITIN) 40 % paste, , Topical, Q12H, Bashir MCGREGOR MD, Given at 05/20/21 1029    sodium chloride (NS) flush 5-10 mL, 5-10 mL, IntraVENous, PRN, Denise Mcardle, MD    sodium chloride (NS) flush 5-40 mL, 5-40 mL, IntraVENous, Q8H, Robert Galicia MD, 10 mL at 05/20/21 0637    sodium chloride (NS) flush 5-40 mL, 5-40 mL, IntraVENous, PRN, Marcello Ybarra MD    acetaminophen (TYLENOL) tablet 650 mg, 650 mg, Oral, Q6H PRN **OR** acetaminophen (TYLENOL) suppository 650 mg, 650 mg, Rectal, Q6H PRN, Marcello Ybarra MD    albuterol-ipratropium (DUO-NEB) 2.5 MG-0.5 MG/3 ML, 3 mL, Nebulization, Q6H PRN, Marcello Ybarra MD, 3 mL at 05/20/21 1043    Warfarin - Pharmacy to Dose, , Other, Rx Dosing/Monitoring, Dominique Walter, NP    sodium chloride (NS) flush 5-40 mL, 5-40 mL, IntraVENous, Q8H, Braxton, Triny B, NP, 40 mL at 05/20/21 1308    sodium chloride (NS) flush 5-40 mL, 5-40 mL, IntraVENous, PRN, Braxton, Triny B, NP    nystatin (MYCOSTATIN) 100,000 unit/gram powder, , Topical, PRN, Myrna Wade MD       A/P  S/P LVAD - good flows  Renal failure - HD  Urosepsis - Abx's  Need for anticoagulation - coumadin      Risk of morbidity and mortality - high  Medical decision making - high complexity    Signed By: Roxy Garcias MD

## 2021-05-20 NOTE — PROGRESS NOTES
Problem: Self Care Deficits Care Plan (Adult)  Goal: *Acute Goals and Plan of Care (Insert Text)  Description:   FUNCTIONAL STATUS PRIOR TO ADMISSION: Patient was modified independent using a rollator for functional mobility prior to initial hospitalization in [facility] and transfer to [facility]. HOME SUPPORT: The patient lived alone with LTC aides to assist her however now her daughter plans to live with her post discharge. Occupational Therapy Goals  Initiated 5/17/2021  1. Patient will perform grooming with modified independence within 7 day(s). 2.  Patient will perform bathing with moderate assistance  within 7 day(s). 3.  Patient will perform lower body dressing with moderate assistance  within 7 day(s). 4.  Patient will perform toilet transfers with minimal assistance/contact guard assist within 7 day(s). 5.  Patient will perform all aspects of toileting with moderate assistance  within 7 day(s). 6.  Patient will participate in upper extremity therapeutic exercise/activities with supervision/set-up for 5 minutes within 7 day(s). 7.  Patient will utilize energy conservation techniques during functional activities with verbal cues within 7 day(s). Outcome: Progressing Towards Goal   OCCUPATIONAL THERAPY TREATMENT  Patient: Brandan Lerner (62 y.o. female)  Date: 5/20/2021  Diagnosis: Acute encephalopathy [G93.40] Acute encephalopathy       Precautions: Fall (prior LVAD)  Chart, occupational therapy assessment, plan of care, and goals were reviewed. ASSESSMENT  Patient continues with skilled OT services and is slowly progressing towards goals. Patient continues to be limited by intermittent confusion, generalized weakness, decreased endurance, decreased activity tolerance, and impaired balance. Patient required MIN A for LVAD management today and for donning of hospital gown following UB/LB bathing tasks.  Patient continent of bowel on [facility] however unaware of when BM starts/ends and performing several sit <> stand transfers for OT to perform pericare when BM had not ceased. Continue to recommend rehab vs home with [address] and support from daughter. Current Level of Function Impacting Discharge (ADLs): up to TOTAL A for toileting tasks     Other factors to consider for discharge: cognition; dialysis MWF         PLAN :  Patient continues to benefit from skilled intervention to address the above impairments. Continue treatment per established plan of care to address goals. Recommend with staff: OOB x 3 to chair    Recommend next OT session: LB ADL training    Recommendation for discharge: (in order for the patient to meet his/her long term goals)  Therapy 3 hours per day 5-7 days per week vs home with [address] pending functional/medical progression     This discharge recommendation:  Has not yet been discussed the attending provider and/or case management    IF patient discharges home will need the following DME: TBD       SUBJECTIVE:   Patient stated Thank you.     OBJECTIVE DATA SUMMARY:   Cognitive/Behavioral Status:  Neurologic State: Alert  Orientation Level: Oriented X4  Cognition: Appropriate for age attention/concentration  Perception: Appears intact  Perseveration: No perseveration noted  Safety/Judgement: Decreased insight into deficits; Decreased awareness of need for safety;Decreased awareness of need for assistance    Functional Mobility and Transfers for ADLs:  Bed Mobility:  Supine to Sit: Minimum assistance    Transfers:  Sit to Stand: Minimum assistance;Assist x2 (mod  A)  Functional Transfers  Toilet Transfer : Minimum assistance;Assist x2; Additional time; Adaptive equipment (bariatric BSC)  Bed to Chair: Minimum assistance; Additional time;Assist x2 (rollator)    Balance:  Sitting: Impaired  Sitting - Static: Fair (occasional)  Sitting - Dynamic: Fair (occasional)  Standing: Impaired  Standing - Static: Fair  Standing - Dynamic : Fair    ADL Intervention:            Upper Body Bathing  Bathing Assistance: Set-up; Supervision  Position Performed: Seated in chair    Lower Body Bathing  Bathing Assistance: Maximum assistance (overall; A for bilateral lower legs and periarea)  Perineal  : Total assistance (dependent)  Lower Body : Moderate assistance    Upper Body [address]: Minimum  assistance (for ties)    Lower Body Dressing Assistance  Underpants: Maximum assistance    Toileting  Toileting Assistance: Total assistance(dependent)  Bowel Hygiene: Total assistance (dependent)  Clothing Management: Maximum assistance    Cognitive Retraining  Safety/Judgement: Decreased insight into deficits; Decreased awareness of need for safety;Decreased awareness of need for assistance    Pain:  No complaints    Activity Tolerance:   Fair    After treatment patient left in no apparent distress:   Sitting in chair and Call bell within reach    COMMUNICATION/COLLABORATION:   The patients plan of care was discussed with: Physical therapist and Registered nurse.      Caitlin Gamino  Time Calculation: 30 mins

## 2021-05-20 NOTE — PROGRESS NOTES
Hospitalist Progress Note  Zuleyma Marc MD  Answering service: [phone] OR [phone] from in house phone      Date of Service:  2021  NAME:  Estella Oneill  :  1950  MRN:  [id number]    Admission Summary: This is a 57-year-old female who has advanced at heart failure with ventricular assist device support is admitted for dyspnea. She is found to have sepsis on further work-up. Interval history / Subjective: Follow-up for dyspnea/CHF/sepsis   Patient seen in a recliner chair by the bedside. She is on room air. She notes improvement in the shortness of breath, no chest pain. Assessment & Plan:     #. Sepsis likely source chronic surgical wound infection.   -Now back on home Keflex now. #Candida UTI: Continue Eraxis. ID help appreciated. #. Acute on chronic respiratory failure :due to sepsis, chf and severe copd- resolved. - extubated. doing well on her home trilogy ventilator at night. #. Chronic RV failure  #. CAD: home regimen  #. Chronic sCHF: BiVentricular , s/p LVAD, s/p ICD. 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 H/o breast cancer () - s/p bilateral mastectomy/chemo   #. Lymphedema of LLE due to cancer treatment  #. COPD: no acute exacerbation. FEV1 50%  #. AFib: AC as above   #. ROCIO on CKD stage 4: progressive CKD and CRS- started HD   - Nephro following: R- IJ permacath in situ- OP HD set up at [place], [place]     #. Hyperkalemia, resolved  #. Pulmonary hypertension  #. Morbid obesity, BMI 42.29 kg/m². #. DM2: A1c 7.2, SSI, AccuChecks, monitor  #. JED on Trilogy  #.  Urinary incontinence, severe  #. Endometrial cancer (2002)- s/p Hysterectomy  #. HTN: chronic, stable, Home regimen, PRN BP meds. Monitor  #. HLD: chronic, Stable, Home regimen    Code status: Full  DVT prophylaxis: warfarin  Care Plan discussed with: Patient/Family and Nurse  Disposition: Home with home health PT OT in 1-2 days. She will finish Eraxis on 5/21     Hospital Problems  Date Reviewed: 4/25/2021        Codes Class Noted POA    Severe sepsis with acute organ dysfunction [facility]) ICD-10-CM: A41.9, R65.20  ICD-9-CM: 038.9, 995.92 Acute 5/11/2021 Yes        * (Principal) Acute encephalopathy ICD-10-CM: G93.40  ICD-9-CM: 348.30  5/11/2021 Unknown            Review of Systems:   Pertinent items are mentioned in interval history. Vital Signs:    Last 24hrs VS reviewed since prior progress note. Most recent are:  Visit Vitals  /80   Pulse 84   Temp 98.9 °F (37.2 °C)   Resp 20   Ht 5' 7\" (1.702 m)   Wt 107.9 kg (237 lb 14 oz)   SpO2 92%   BMI 37.26 kg/m²         Intake/Output Summary (Last 24 hours) at 5/20/2021 1524  Last data filed at 5/20/2021 0945  Gross per 24 hour   Intake 580 ml   Output 2000 ml   Net -1420 ml        Physical Examination:   Evaluated face to face and examined 05/20/21    General:   Sitting in a wheelchair by the bedside. Alert oriented x4. Not in distress. Card:  humming VAD sound. good peripheral perfusion  Resp:  No accessory muscle use, fair AE, no wheezes. no crepitations  Abd:  Soft, non-tender, non-distended, BS+  Extremities:  No cyanosis or clubbing, edema present. . Venous insufficiency discoloration. Neuro:  Grossly normal, no focal neuro deficits, follows commands, speech wnl. Psych:  Good insight, not agitated.     Data Review:    Review and/or order of clinical lab test  Review and/or order of tests in the radiology section of CPT  Review and/or order of tests in the medicine section of CPT  Labs:     Recent Labs     05/20/21  0443 05/19/21  0454   WBC 4.8 3.5*   HGB 9.0* 9.1*   HCT 30.4* 31.2*    143*     Recent Labs     05/20/21 0445 05/20/21 0443 05/19/21 0454 05/19/21 0453 05/18/21  0354   NA  --  134* 139  --  135*   K  --  4.0 3.9  --  4.0   CL  --  100 104  --  101   CO2  --  24 27  --  23   BUN  --  35* 27*  --  51*   CREA  --  4.77* 3.86*  --  5.74*   GLU  --  124* 116*  --  130*   CA  --  9.0 8.8  --  8.7   MG 2.4  --   --  2.4 2.5*   PHOS 4.2  --   --  3.6 5.0*     Recent Labs     05/20/21 0443 05/19/21 0454 05/18/21  0354   ALT 9* 8* 7*   AP 52 48 59   TBILI 0.6 0.7 0.7   TP 7.5 7.3 7.4   ALB 3.0* 3.0* 3.2*   GLOB 4.5* 4.3* 4.2*     Recent Labs     05/20/21 0445 05/19/21 0453 05/18/21  0354   INR 3.2* 3.9* 4.2*   PTP 32.0* 38.2* 40.9*      No results for input(s): FE, TIBC, PSAT, FERR in the last 72 hours. Lab Results   Component Value Date/Time    Folate 10.0 10/28/2020 03:56 AM      No results for input(s): PH, PCO2, PO2 in the last 72 hours. No results for input(s): CPK, CKNDX, TROIQ in the last 72 hours.     No lab exists for component: CPKMB  Lab Results   Component Value Date/Time    Cholesterol, total 129 04/16/2021 10:40 AM    HDL Cholesterol 33 04/16/2021 10:40 AM    LDL, calculated 74 04/16/2021 10:40 AM    Triglyceride 110 04/16/2021 10:40 AM    CHOL/HDL Ratio 3.9 04/16/2021 10:40 AM     Lab Results   Component Value Date/Time    Glucose (POC) 162 (H) 05/20/2021 11:32 AM    Glucose (POC) 132 (H) 05/20/2021 06:24 AM    Glucose (POC) 161 (H) 05/19/2021 09:31 PM    Glucose (POC) 119 (H) 05/19/2021 04:20 PM    Glucose (POC) 178 (H) 05/19/2021 11:27 AM     Lab Results   Component Value Date/Time    Color DARK YELLOW 05/11/2021 03:57 AM    Appearance TURBID (A) 05/11/2021 03:57 AM    Specific gravity 1.029 05/11/2021 03:57 AM    Specific gravity 1.013 11/09/2020 12:05 AM    pH (UA) 5.0 05/11/2021 03:57 AM    Protein 100 (A) 05/11/2021 03:57 AM    Glucose Negative 05/11/2021 03:57 AM    Ketone TRACE (A) 05/11/2021 03:57 AM    Bilirubin Negative 04/18/2021 05:08 PM    Urobilinogen 0.2 05/11/2021 03:57 AM    Nitrites Positive (A) 05/11/2021 03:57 AM    Leukocyte Esterase LARGE (A) 05/11/2021 03:57 AM    Epithelial cells MODERATE (A) 05/11/2021 03:57 AM    Bacteria 1+ (A) 05/11/2021 03:57 AM    WBC >100 (H) 05/11/2021 03:57 AM    RBC 20-50 05/11/2021 03:57 AM     Medications Reviewed:     Current Facility-Administered Medications   Medication Dose Route Frequency    epoetin amalia-epbx (RETACRIT) injection 20,000 Units  20,000 Units SubCUTAneous Q TUE, THU & SAT    artificial saliva (MOUTH KOTE) 1 Spray  1 Spray Oral PRN    cephALEXin (KEFLEX) capsule 250 mg  250 mg Oral BID    PARoxetine (PAXIL) tablet 20 mg  20 mg Oral DAILY    hydrocortisone (CORTAID) 1 % cream   Topical PRN    senna-docusate (PERICOLACE) 8.6-50 mg per tablet 1 Tab  1 Tablet Oral DAILY    glucose chewable tablet 16 g  4 Tablet Oral PRN    dextrose (D50W) injection syrg 12.5-25 g  25-50 mL IntraVENous PRN    glucagon (GLUCAGEN) injection 1 mg  1 mg IntraMUSCular PRN    insulin lispro (HUMALOG) injection   SubCUTAneous AC&HS    famotidine (PEPCID) tablet 20 mg  20 mg Oral DAILY    benzocaine-zinc cl-benzalkonium cl (ORAJEL) 20-0.1-0.02 % mucosal gel   Oral PRN    magic mouthwash cpd (without sucralfate)  5 mL Oral DAILY PRN    anidulafungin (ERAXIS) 100 mg in 0.9% sodium chloride 130 mL IVPB  100 mg IntraVENous Q24H    balsam peru-castor oiL (VENELEX) ointment   Topical BID    albumin human 25% (BUMINATE) solution 50 g  50 g IntraVENous DIALYSIS PRN    heparin (porcine) 1,000 unit/mL injection 1,900 Units  1,900 Units InterCATHeter DIALYSIS PRN    And    heparin (porcine) 1,000 unit/mL injection 1,900 Units  1,900 Units InterCATHeter DIALYSIS PRN    hydrALAZINE (APRESOLINE) 20 mg/mL injection 20 mg  20 mg IntraVENous Q6H PRN    hydrALAZINE (APRESOLINE) 20 mg/mL injection 10 mg  10 mg IntraVENous Q6H PRN    ranolazine ER (RANEXA) tablet 1,000 mg  1,000 mg Oral BID    hydrALAZINE (APRESOLINE) tablet 100 mg  100 mg Oral TID    isosorbide mononitrate ER (IMDUR) tablet 30 mg  30 mg Oral DAILY    tafamidis cap 61 mg (Patient Supplied)  61 mg Oral DAILY    ondansetron (ZOFRAN) injection 4 mg  4 mg IntraVENous Q4H PRN    zinc oxide-cod liver oil (DESITIN) 40 % paste   Topical Q12H    sodium chloride (NS) flush 5-10 mL  5-10 mL IntraVENous PRN    sodium chloride (NS) flush 5-40 mL  5-40 mL IntraVENous Q8H    sodium chloride (NS) flush 5-40 mL  5-40 mL IntraVENous PRN    acetaminophen (TYLENOL) tablet 650 mg  650 mg Oral Q6H PRN    Or    acetaminophen (TYLENOL) suppository 650 mg  650 mg Rectal Q6H PRN    albuterol-ipratropium (DUO-NEB) 2.5 MG-0.5 MG/3 ML  3 mL Nebulization Q6H PRN    Warfarin - Pharmacy to Dose   Other Rx Dosing/Monitoring    sodium chloride (NS) flush 5-40 mL  5-40 mL IntraVENous Q8H    sodium chloride (NS) flush 5-40 mL  5-40 mL IntraVENous PRN    nystatin (MYCOSTATIN) 100,000 unit/gram powder   Topical PRN   ______________________________________________________________________  EXPECTED LENGTH OF STAY: 4d 19h  ACTUAL LENGTH OF STAY:          9               Alma Barney MD

## 2021-05-20 NOTE — PROGRESS NOTES
ID Progress Note  2021    Subjective:     Feeling better overall    Objective:     Antibiotics:  1. Vancomycin   2. Eraxis   3. Zosyn       Vitals:   Visit Vitals  /80 Comment: treatment ended. Blood returned   Pulse 93   Temp 98.5 °F (36.9 °C)   Resp 20   Ht 5' 7\" (1.702 m)   Wt 107.9 kg (237 lb 14 oz)   SpO2 95%   BMI 37.26 kg/m²        Tmax:  Temp (24hrs), Av.6 °F (37 °C), Min:98.3 °F (36.8 °C), Max:98.9 °F (37.2 °C)      Exam:  Lungs:  clear to auscultation bilaterally  Heart:  regular rate and rhythm  Abdomen:  soft, non-tender. Bowel sounds normal. No masses,  no organomegaly    Labs:      Recent Labs     21  0443 21  0454 21  0354   WBC 4.8 3.5* 3.7   HGB 9.0* 9.1* 9.0*    143* 139*   BUN 35* 27* 51*   CREA 4.77* 3.86* 5.74*   AP 52 48 59   TBILI 0.6 0.7 0.7       Cultures:     No results found for: SDES  Lab Results   Component Value Date/Time    Culture result: CANDIDA ALBICANS (A) 2021 11:29 AM    Culture result: RARE DIPHTHEROIDS (A) 2021 04:29 AM    Culture result: LIGHT YEAST, (APPARENT CANDIDA ALBICANS) (A) 2021 07:09 PM    Culture result: LIGHT NORMAL RESPIRATORY BETO 2021 07:09 PM       Radiology:     Line/Insert Date:           Assessment:     1. Chronic SWI  2. Candida UTI--can treat with fluconazole if able  3. LVAD infection     Objective:     1.  Contraindications to fluconazole--will continue Eraxis until discharge or , whichever comes first--will not need treatment past the time of discharge     Douglas Stein MD

## 2021-05-20 NOTE — PROGRESS NOTES
Comprehensive Nutrition Assessment    Type and Reason for Visit: Reassess    Nutrition Recommendations/Plan:    1. Adjust diet texture PRN for mouth sores   - give magic mouthwash PRN before meals  2. Encourage PO with all meals  3. Daily weights     Nutrition Assessment:    Pt admitted for acute encephalopathy. PMHx: heart failure s/p LVAD, COPD, CKD3, pulmonary HTN, Depression, HTN, and HLD, ESRD on HD. Advanced Heart Failure Team following. New to HD last admit, 1st session on 4/26/20. Extubated 5/13. WOCN following for multiple wounds POA including chronic sternal wound (see below). ID following for abx with UTI and LVAD infection. Possible d/c next week per pt.      Pt visited yesterday. Sore mouth continued with Orajel and magic mouthwash in place but pt reports not getting any mouthwash - discussed with RN yesterday to provide. Likes boiled eggs, raisin bran, frosted flakes, hamburgers, cottage cheese, chocolate milk. Some food restrictions now d/t difficulty biting with dental damage this admit. Meal orders taken during visit yesterday. Pt interested in trying chocolate protein drinks again - Ensure HP added BID (320kcal, 32g protein). Diet liberalized to regular since food options a barrier to eating and not eating large enough amounts to be contributing    Severe wt loss of 20% x 6 months. Some likely d/t fluid losses with start of HD but also fair intake and muscle wasting. LE and UE muscle wasting observed.     Malnutrition Assessment:  Malnutrition Status:  Severe malnutrition    Context:  Chronic illness     Findings of the 6 clinical characteristics of malnutrition:   Energy Intake:  7 - 75% or less est energy requirements for 1 month or longer  Weight Loss:  7.00 - Greater than 10% over 6 months     Body Fat Loss:  Unable to assess,     Muscle Mass Loss:  1 - Mild muscle mass loss, Calf (gastrocnemius)  Fluid Accumulation:  Unable to assess,     Strength:        Nutritionally Significant Medications:eraxis, retacrit, pepcid, SSI, paxil, pericolace, coumadin; PRN: mouthkote, orajel, zofran, magic mouthwash    Estimated Daily Nutrient Needs:  Energy (kcal): 2101-2292kcal (MSJ x 1.2-1-2.2); Weight Used for Energy Requirements: Current (103kg)  Protein (g): 123-144g (1.2-1.4g/kg);  Weight Used for Protein Requirements: Current (103kg)  Fluid (ml/day): 1500 - or per renal; Method Used for Fluid Requirements: Standard renal    Nutrition Related Findings:       BM: 5/20  Edema: trace  Wounds:   (see WOCN notes)       Current Nutrition Therapies:   Diet: regular  Supplements: Ensure HP BID  Meal Intake:   Patient Vitals for the past 168 hrs:   % Diet Eaten   05/18/21 2330 0%   05/18/21 2050 76 - 100%   05/18/21 1530 0%   05/18/21 1124 26 - 50%   05/18/21 0817 26 - 50%   05/17/21 1810 1 - 25%   05/17/21 1230 0%   05/17/21 0747 1 - 25%   05/16/21 1508 26 - 50%   05/16/21 1131 0%   05/16/21 0837 76 - 100%   05/15/21 1800 51 - 75%   05/15/21 1400 51 - 75%   05/14/21 0900 51 - 75%         Anthropometric Measures:  · Height:  5' 7\" (170.2 cm)  · Current Body Wt:  103 kg (227 lb 1.2 oz)   · Admission Body Wt:       · Usual Body Wt:        · Ideal Body Wt:  135:  168.2 %  Wt Readings from Last 10 Encounters:   05/20/21 107.9 kg (237 lb 14 oz)   05/06/21 111.9 kg (246 lb 11.1 oz)   04/15/21 122.9 kg (271 lb)   04/15/21 122.9 kg (271 lb)   03/16/21 124.7 kg (275 lb)   03/15/21 124.7 kg (275 lb)   02/09/21 127.9 kg (282 lb)   01/07/21 127.9 kg (282 lb)   12/08/20 128.8 kg (284 lb)   11/20/20 128 kg (282 lb 3 oz)     Nutrition Diagnosis:   · Increased nutrient needs related to renal dysfunction (wounds) as evidenced by wounds, dialysis    · Severe malnutrition related to increased demand for energy/nutrients, inadequate protein-energy intake as evidenced by poor intake prior to admission, weight loss greater than or equal to 20% in 1 year, moderate muscle loss    Nutrition Interventions:   Food and/or Nutrient Delivery: Modify current diet, Start oral nutrition supplement  Nutrition Education and Counseling: Education completed  Coordination of Nutrition Care: Continue to monitor while inpatient    Goals:  Consumption of at least 60% meals in 3-4 days; good protein intake with each meal       Nutrition Monitoring and Evaluation:   Behavioral-Environmental Outcomes: None identified  Food/Nutrient Intake Outcomes: Food and nutrient intake, Supplement intake  Physical Signs/Symptoms Outcomes: Weight, Biochemical data    Discharge Planning:     Too soon to determine     Kira Anne Marie, RD  [address] , Pager #[phone] or via [email or web address]

## 2021-05-20 NOTE — DIALYSIS
[facility] [address] [facility] [place]  [phone]    Vitals   Pre   Post   Assessment   Pre   Post     Temp  Temp: 98.9 °F (37.2 °C) (05/20/21 0430) 98.3 LOC  AXOX4 AXOX4   HR   88 82   Lungs   CONGESTED CONGESTED   B/P   BP:  (doppler 78) (05/20/21 0614) 76 Cardiac   LVAD LVAD   Resp   17 18 Skin   WDI WDI   Pain level  0   0   Edema  GENERALIZED     GENRALIZED     Orders:    Duration:   Start:    8806 End:   0945 Total:   3   Dialyzer:   Dialyzer/Set Up Inspection: Neri Cintron (05/20/21 0614)   Christy Traore Bath:   Dialysate K (mEq/L): 3.5 (05/20/21 0614)   Ca Bath:   Dialysate CA (mEq/L): 2.5 (05/20/21 0330)   Na/Bicarb:   Dialysate NA (mEq/L): 140 (05/20/21 3780)   Target Fluid Removal:   Goal/Amount of Fluid to Remove (mL): 2000 mL (05/20/21 0229)   Access     Type & Location:   RIJ CVC: Dressing CDI. No s/s of infection. Both lumens aspirate & flush well. Running well at . Labs     Obtained/Reviewed   Critical Results Called   Date when labs were drawn-  Hgb-    HGB   Date Value Ref Range Status   05/20/2021 9.0 (L) 11.5 - 16.0 g/dL Final     K-    Potassium   Date Value Ref Range Status   05/20/2021 4.0 3.5 - 5.1 mmol/L Final     Ca-   Calcium   Date Value Ref Range Status   05/20/2021 9.0 8.5 - 10.1 MG/DL Final     Bun-   BUN   Date Value Ref Range Status   05/20/2021 35 (H) 6 - 20 MG/DL Final     Creat-   Creatinine   Date Value Ref Range Status   05/20/2021 4.77 (H) 0.55 - 1.02 MG/DL Final     Comment:     INVESTIGATED PER DELTA CHECK PROTOCOL        Medications/ Blood Products Given     Name   Dose   Route and Time                     Blood Volume Processed (BVP):    62.4 Net Fluid   Removed:  2000   Comments   Time Out Done: YES  Primary Nurse Rpt Pre Gilson Rice RN  Primary Nurse Golden Myles RN  Pt Elis Mancilla MD ORDERS  Tx Summary:     SBAR received from Primary RN. Pt A&Ox4. Consent signed & on file.    8730: Each catheter limb disinfected per p&p, caps removed, hubs disinfected per p&p. Each lumen aspirated for blood return and flushed with Normal Saline per policy. 0645VSS. Dialysis Tx initiated. 0945 Tx ended. VSS. Each dialysis catheter limb disinfected per p&p, all possible blood returned per p&p, and each dialysis hub disinfected per p&p. Each lumen flushed, post dialysis catheter saline and caps applied. Bed locked and in the lowest position, call bell and belongings in reach. SBAR given to Hillary, RN. Patient is stable at time of my departure. All Dialysis related medications have been reviewed. Admiting Diagnosis:  Consent signed - Informed Consent Verified: Yes (05/20/21 4742)  [facility] Consent - ON FILE  Hepatitis Status- 5/19/21 NEG 4/21/21 [facility]  Machine #- Machine Number: [id number] (05/20/21 5956)  Telemetry status-REMOTE  Pre-dialysis wt. - Pre-Dialysis Weight: 109 kg (240 lb 4.8 oz) (05/18/21 1230)

## 2021-05-20 NOTE — PROGRESS NOTES
[facility] in [place], [place]  Inpatient Consult Progress Note      Patient name: Earlene Perez  Patient : 1950  Patient MRN: [id number]  Consulting MD: Karo Alfredo MD  Date of service: 21    CHIEF COMPLAINT:  Chief Complaint   Patient presents with    Altered mental status        PLAN:  Continue set speed 9400rpm, low speed 9000rpm   Cont home dose hydralazine/Imdur  Cont Tafamidis 61mg daily  Intolerant to BB/ACEi/ARB/ARNI due to aTTR  Intolerant to MRA due to hyperkalemia  Volume management per Nephrology; strict I/O, standing weights  HD today   Pulmonary hygiene including IS and nebulizers   Start Chest PT  Cont Eraxis until . ID recommendations appreciated for C. Albicans in urine and sputum   Blood cultures negative   Drive line culture- rare diphtheroids   Repeat Urine culture via straight cath  Coumadin dose per pharmacy; goal INR lowered to 1.8-2.5 due to GIB  Resume home keflex dose- d/w with ID  Appreciate EP recommendations- no changes to current therapy.    TSH 1.60- monitor for now   Bowel regimen  Advance diet   PT/OT   Standing weights only  Cont to avoid O2 on patient due to intolerance   Up to date on flu, PNA, and COVID vaccinations   Will need to start DC plan for home with PT/OT maybe early next week.      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 Ralph Velasco)  · C/b delayed extubation due to severe COPD  · C/b critical illness polyneuropathy  · C/b prolonged hospitalization post-LVAD, 55 days  · C/b sternal wound infection s/p debridement (by Dr. Lizabeth Gauthier) s/p wound vac  · C/b sacral ulcer  · Would culture positive for Staph aureus, not MRSA  · C/b LVAD site drainage, improved  S/p CRT-ICD  · ICD fired due to electrolyte imbalance (2011)  H/o breast cancer (1992)   · s/p bilateral mastectomy/chemo and endometrial cancer s/p hysterectomy  · Lymphedema of LLE due to cancer treatment  Severe COPD with FEV1 50%  Depression  Atrial fibrillation  H/o \"two mini strokes\"  S/p fall with hip facture   · Right hip hemmiarthroplasty (5/23/18) by Dr. Shin President)  · S/p removed hip hardwarare due to pain (4/15/19)  COPD severe  CKD, stage 4- on HD  Hyperkalemia, resolved  Pulmonary hypertension  Cardiac risk factors:  · Morbid obesity, Body mass index is 42.29 kg/m². · DM2 insulin dependent  · JED on Trilogy  · HL  Urinary incontinence, severe  · no procedures due to anticoagulation  Endometrial cancer (2002)  HTN  HL    Recent Events:  Slept well  No acute overnight events  PCT normalized      CARDIAC IMAGING:  TTE 5/11/21 LVEF < 15%, LVIDd 5.96cm, TAPSE 0.99cm, RVIDd 4.14cm  TTE 4/19 shows improved LVIDd, 6.68 cm with RVIDd 5.14 cm and TAPSE 1.1 cm   TTE 4/28/21 LVIDd 7.37cm, RVIDd 5.27cm, TAPSE 1.44cm   Echo (9/24/19) LVEF 20-25%, AV opens 1:1, no AR  Echo (5/29/18) LVEF 10-%, ramps study done, report in epic  Echo (1/9/18) ramp study done, LVEDD 7.1cm  LHC (11/9/18) 2 vessel disease with 90% OM, 80% PDA, DSA to PDA branch  RHC 4/20 showed adequate Sal CI 3.0 but severely elevated RA pressure 24 mmHg  CXR negative 4/27/21     INTERVAL HISTORY:  Ms. Mekhi Rodriguez is a 78 y.o. Female s/p LVAD implant with HM 2 and ESRD requiring HD that was recently admitted for RV failure and worsening renal function. She was discharged on 5/5/21 to [facility] for rehabilitation before transitioning home to live her with daughter.  In the last few days she had worsening mental status changed, decreased UOP and respiratory distress after possible aspiration- she was intubated and admitted to [facility] for further management. LVAD INTERROGATION:  Device interrogated in person  Device function normal, normal flow, no events  LVAD   Pump Speed (RPM): 9400  Pump Flow (LPM): 4.5  MAP: 76  PI (Pulsitility Index): 5.8  Power: 5.8   Test: Yes  Back Up  at Bedside & Labeled: Yes  Power Module Test: Yes  Driveline Site Care: No  Driveline Dressing: Clean, Dry, and Intact  Outpatient: No  MAP in Therapeutic Range (Outpatient): Yes  Testing  Alarms Reviewed: Yes  Back up SC speed: 9400  Back up Low Speed Limit: 9000  Emergency Equipment with Patient?: Yes  Emergency procedures reviewed?: Yes  Drive line site inspected?:  (covered by dressing)  Drive line intergrity inspected?: Yes  Drive line dressing changed?: No    PHYSICAL EXAM:  Visit Vitals  /80 Comment: treatment ended. Blood returned   Pulse 84   Temp 98.3 °F (36.8 °C)   Resp 18   Ht 5' 7\" (1.702 m)   Wt 237 lb 14 oz (107.9 kg)   SpO2 94%   BMI 37.26 kg/m²     General: Patient is well developed, well-nourished in no acute distress, sedated   HEENT: Normocephalic and atraumatic. No scleral icterus. Pupils are equal, round and reactive to light and accomodation. No conjunctival injection. Oropharynx is clear. Neck: Supple. No evidence of thyroid enlargements or lymphadenopathy. JVD: Cannot be appreciated   Lungs: Breath sounds are equal and clear bilaterally. No wheezes, rhonchi, or rales. On Vent  Heart: Regular rate and rhythm with normal S1 and S2. No murmurs, gallops or rubs. Abdomen: Soft, no mass or tenderness. No organomegaly or hernia. Bowel sounds present. Genitourinary and rectal: deferred  Extremities: No cyanosis, clubbing, or edema. Neurologic: No focal sensory or motor deficits are noted. Grossly intact. Psychiatric: Awake, alert an doriented x 3. Appropriate mood and affect.   Skin: Warm, dry and well perfused. No lesions, nodules or rashes are noted. REVIEW OF SYSTEMS:  Review of Systems   Constitutional: Positive for malaise/fatigue. Negative for chills and fever. Respiratory: Negative for cough and shortness of breath. Cardiovascular: Negative for chest pain and leg swelling. Gastrointestinal: Negative for heartburn and nausea. Genitourinary: Negative. Musculoskeletal: Negative. Neurological: Negative. Endo/Heme/Allergies: Negative. Psychiatric/Behavioral: The patient does not have insomnia. PAST MEDICAL HISTORY:  Past Medical History:   Diagnosis Date    Asthma     [facility]     breast    [facility]     endometrial    Congestive heart failure, unspecified     CRI (chronic renal insufficiency)     Depression     Diabetes ([facility]     Hypertension     Severe sepsis with acute organ dysfunction ([facility] 5/11/2021       PAST SURGICAL HISTORY:  Past Surgical History:   Procedure Laterality Date    HX HERNIA REPAIR      HX HYSTERECTOMY      HX MASTECTOMY      IR INSERT NON TUNL CVC OVER 5 YRS  4/26/2021    IR INSERT TUNL CVC W/O PORT OVER 5 YR  5/5/2021       FAMILY HISTORY:  No family history on file. SOCIAL HISTORY:  Social History     Socioeconomic History    Marital status:      Spouse name: Not on file    Number of children: Not on file    Years of education: Not on file    Highest education level: Not on file   Tobacco Use    Smoking status: Never Smoker    Smokeless tobacco: Never Used   Substance and Sexual Activity    Alcohol use: Not Currently     Social Determinants of Health     Financial Resource Strain:     Difficulty of Paying Living Expenses:    Food Insecurity:     Worried About Running Out of Food in the Last Year:     [address] in the Last Year:    Transportation Needs:     Lack of Transportation (Medical):      Lack of Transportation (Non-Medical):    Physical Activity:     Days of Exercise per Week:     Minutes of Exercise per Session:    Stress:     Feeling of Stress :    Social Connections:     Frequency of Communication with Friends and Family:     Frequency of Social Gatherings with Friends and Family:     Attends Yazdanism Services:     Active Member of Clubs or Organizations:     Attends Club or Organization Meetings:     Marital Status:        LABORATORY RESULTS:     Labs Latest Ref Rng & Units 5/20/2021 5/19/2021 5/18/2021 5/17/2021 5/16/2021 5/15/2021 5/14/2021   WBC 3.6 - 11.0 K/uL 4.8 3.5(L) 3.7 3.7 4.1 4.5 7.8   RBC 3.80 - 5.20 M/uL 3.35(L) 3.38(L) 3.29(L) 3.08(L) 3.22(L) 2.88(L) 3.14(L)   Hemoglobin 11.5 - 16.0 g/dL 9. 0(L) 9.1(L) 9.0(L) 8.6(L) 8.8(L) 7. 7(L) 8.6(L)   Hematocrit 35.0 - 47.0 % 30. 4(L) 31. 2(L) 30. 0(L) 28. 3(L) 29. 9(L) 26. 8(L) 28. 9(L)   MCV 80.0 - 99.0 FL 90.7 92.3 91.2 91.9 92.9 93.1 92.0   Platelets 203 - 224 K/uL 154 143(L) 139(L) 127(L) 134(L) 126(L) 149(L)   Lymphocytes 12 - 49 % 11(L) 11(L) 5(L) 11(L) 9(L) 9(L) 4(L)   Monocytes 5 - 13 % 8 13 12 13 11 11 7   Eosinophils 0 - 7 % 0 0 0 0 0 0 0   Basophils 0 - 1 % 1 1 0 1 0 0 0   Bands 0 - 6 % - - - - - 8(H) 11(H)   Albumin 3.5 - 5.0 g/dL 3. 0(L) 3.0(L) 3. 2(L) 3. 1(L) 3.4(L) 3. 2(L) 3.6   Calcium 8.5 - 10.1 MG/DL 9.0 8.8 8.7 8.7 9.1 8.8 9.3   Glucose 65 - 100 mg/dL 124(H) 116(H) 130(H) 111(H) 119(H) 118(H) 96   BUN 6 - 20 MG/DL 35(H) 27(H) 51(H) 41(H) 28(H) 49(H) 31(H)   Creatinine 0.55 - 1.02 MG/DL 4.77(H) 3.86(H) 5.74(H) 4.88(H) 3.63(H) 4.79(H) 3.67(H)   Sodium 136 - 145 mmol/L 134(L) 139 135(L) 136 136 135(L) 138   Potassium 3.5 - 5.1 mmol/L 4.0 3.9 4.0 4.2 4.0 4.4 4.1   TSH 0.36 - 3.74 uIU/mL - - - - - 1.60 -   LDH 81 - 246 U/L 216 186 199 156 160 151 189   Some recent data might be hidden     Lab Results   Component Value Date/Time    TSH 1.60 05/15/2021 03:14 AM    TSH 5.36 (H) 04/24/2021 04:32 AM    TSH 3.99 (H) 04/17/2021 04:54 AM    TSH 2.980 10/01/2020 12:00 AM       ALLERGY:  Allergies   Allergen Reactions    Benzodiazepines Other (comments)     Respiratory issues        CURRENT MEDICATIONS:    Current Facility-Administered Medications:     artificial saliva (MOUTH KOTE) 1 Spray, 1 Spray, Oral, PRN, Braxton, Triny B, NP    cephALEXin (KEFLEX) capsule 250 mg, 250 mg, Oral, BID, Braxton, Triny B, NP, 250 mg at 05/20/21 1027    PARoxetine (PAXIL) tablet 20 mg, 20 mg, Oral, DAILY, Festus Ribeiro MD, 20 mg at 05/20/21 1027    hydrocortisone (CORTAID) 1 % cream, , Topical, PRN, Festus Ribeiro MD    senna-docusate (PERICOLACE) 8.6-50 mg per tablet 1 Tab, 1 Tablet, Oral, DAILY, Braxton, Triny B, NP, 1 Tablet at 05/17/21 1237    glucose chewable tablet 16 g, 4 Tablet, Oral, PRN, Elda Pineda MD    dextrose (D50W) injection syrg 12.5-25 g, 25-50 mL, IntraVENous, PRN, Elda Pineda MD    glucagon [facility]) injection 1 mg, 1 mg, IntraMUSCular, PRN, Elda Pineda MD    insulin lispro (HUMALOG) injection, , SubCUTAneous, AC&HS, Jem Davis MD, 2 Units at 05/19/21 1217    famotidine (PEPCID) tablet 20 mg, 20 mg, Oral, DAILY, Elena Rojo MD, 20 mg at 05/20/21 1027    benzocaine-zinc cl-benzalkonium cl (ORAJEL) 20-0.1-0.02 % mucosal gel, , Oral, PRN, Elena Rojo MD    magic mouthwash cpd (without sucralfate), 5 mL, Oral, DAILY PRN, Elena Rojo MD, 5 mL at 05/19/21 1507    anidulafungin (ERAXIS) 100 mg in 0.9% sodium chloride 130 mL IVPB, 100 mg, IntraVENous, Q24H, Nisreen Ramos MD, 100 mg at 05/19/21 1852    balsam peru-castor oiL (VENELEX) ointment, , Topical, BID, Elena Rojo MD, Given at 05/20/21 1028    albumin human 25% (BUMINATE) solution 50 g, 50 g, IntraVENous, DIALYSIS PRN, Tarah Joseph MD    heparin (porcine) 1,000 unit/mL injection 1,900 Units, 1,900 Units, InterCATHeter, DIALYSIS PRN, 1,900 Units at 05/18/21 1522 **AND** heparin (porcine) 1,000 unit/mL injection 1,900 Units, 1,900 Units, InterCATHeter, DIALYSIS PRN, Tarah Joseph MD, 1,900 Units at 05/18/21 1521   hydrALAZINE (APRESOLINE) 20 mg/mL injection 20 mg, 20 mg, IntraVENous, Q6H PRN, Braxton, Triny B, NP    hydrALAZINE (APRESOLINE) 20 mg/mL injection 10 mg, 10 mg, IntraVENous, Q6H PRN, Braxton, Triny B, NP, 10 mg at 05/13/21 1052    ranolazine ER (RANEXA) tablet 1,000 mg, 1,000 mg, Oral, BID, Braxton, Triny B, NP, 1,000 mg at 05/20/21 1027    hydrALAZINE (APRESOLINE) tablet 100 mg, 100 mg, Oral, TID, Braxton, Triny B, NP, 100 mg at 05/20/21 1027    isosorbide mononitrate ER (IMDUR) tablet 30 mg, 30 mg, Oral, DAILY, Braxton, Triny B, NP, 30 mg at 05/20/21 1027    tafamidis cap 61 mg (Patient Supplied), 61 mg, Oral, DAILY, Kenzie Bishop MD, 61 mg at 05/20/21 1030    ondansetron (ZOFRAN) injection 4 mg, 4 mg, IntraVENous, Q4H PRN, Daniella Morrow MD    epoetin amalia-epbx (RETACRIT) injection 20,000 Units, 20,000 Units, SubCUTAneous, Q TUE, THU & SAT, Len Salinas MD, 20,000 Units at 05/18/21 2041    zinc oxide-cod liver oil (DESITIN) 40 % paste, , Topical, Q12H, Bashir MCGREGOR MD, Given at 05/20/21 1029    sodium chloride (NS) flush 5-10 mL, 5-10 mL, IntraVENous, PRN, Denise Mcardle, MD    sodium chloride (NS) flush 5-40 mL, 5-40 mL, IntraVENous, Q8H, Robert Galicia MD, 10 mL at 05/20/21 7756    sodium chloride (NS) flush 5-40 mL, 5-40 mL, IntraVENous, PRN, Robert Galicia MD    acetaminophen (TYLENOL) tablet 650 mg, 650 mg, Oral, Q6H PRN **OR** acetaminophen (TYLENOL) suppository 650 mg, 650 mg, Rectal, Q6H PRN, Robert Galicia MD    albuterol-ipratropium (DUO-NEB) 2.5 MG-0.5 MG/3 ML, 3 mL, Nebulization, Q6H PRN, Robert Galicia MD, 3 mL at 05/20/21 1043    Warfarin - Pharmacy to Dose, , Other, Rx Dosing/Monitoring, Jose Angel, Earlene Guy, NP    sodium chloride (NS) flush 5-40 mL, 5-40 mL, IntraVENous, Q8H, Braxton, Triny B, NP, 20 mL at 05/20/21 0626    sodium chloride (NS) flush 5-40 mL, 5-40 mL, IntraVENous, PRN, Braxton, Triny B, NP    nystatin (MYCOSTATIN) 100,000 unit/gram powder, , Topical, PRN, Bessie Gallego MD    PATIENT CARE TEAM:  Patient Care Team:  Warren Amaya NP as PCP - General (Nurse Practitioner)  Willian Lopez MD (Cardiology)  Meredith Venegas MD as Physician (Cardiology)  Akash Valera LPN as Care Coordinator  Ayaz Viramontes MD (Cardiothoracic Surgery)  Macarena Martinez MD (Cardiology)     Thank you for allowing me to participate in this patient's care. Anant Schmidt NP  [facility] [address] [place] [address], [place] [address]  Phone: [phone]    [facility] ATTENDING ADDENDUM    Patient was seen and examined in person. Data and notes were reviewed. I have discussed and agree with the plan as noted in the NP note above without further additions.     Kingsley Yarbrough MD PhD  Len Gaviria

## 2021-05-21 NOTE — PROGRESS NOTES
Transitions of Care Plan:  RUR: 38%  Clinical Plan: Therapy today; HD tomorrow; DC Sunday  Consults: Nephrology; [facility]; Therapy  Baseline: dependent on RW or WC for ambulation; [facility] services  Disposition: Home with Home Health and new OP HD set up    HD Set Up Update:  [facility] - only available chair time is MWF 1st chair (6:45AM); patient is currently on TTS schedule; will be difficult to have transportation arranged and family assistance for early morning chair time on MWF. [facility] - daughter willing to travel additional distance if patient can have TTS schedule or 2nd/3rd chair time for MWF. CM requested [facility] to [facility] if [facility] can [facility]. CM sent message to Nephrologist with update. Patient and daughter HD preference:    1) [facility] - TTS with 2nd or 3rd chair time  2) [facility] - MWF with 2nd or 3rd chair time  3) [facility] - MWF 1st chair time (pt on this schedule currently)    Home Health Update:  [address] accepted for resumption of home health orders - SN, LVAD, PT. At this time, CM is awaiting response from [facility] if patient can go to [facility]. MALAIKA has provided the [facility] number for [facility] to call after hours.     Brittani Sullivan, MPH  Care Manager l [employer]  Available via Spreaker or

## 2021-05-21 NOTE — PROGRESS NOTES
07:45 Bedside shift change report given to [address] [place] [address] (oncoming nurse) by Luis Fernando Johnson RN (offgoing nurse). Report included the following information SBAR. Unable to obtain urine specimen for UA via straight cath due to low volume in bladder yesterday. Overnight RN checked bladder several times, attempted straight cath x3 without success. 10:00 Called RT to do ordered chest PT. He states it is on his schedule to do today. 10:30 Sarthak Gomez NP was notified that straight cath attempts were unable to obtain urine specimen for UA. Also verified we are still to do daily LVAD drive line dressing changes per order until discharge. 15:45 Called RT; requested chest PT to be done. 18:20 bladder scan 63 mL     19:30 Bedside shift change report given to [address] (oncoming nurse) by [address] [place] [address] (offgoing nurse). Report included the following information SBAR.

## 2021-05-21 NOTE — PROGRESS NOTES
[facility] VAD/Heart Failure Progress Note    Admit Date: 2021  POD:  * No surgery found *    Procedure:          Subjective:   Looks good this am; no issues overnight; good flows; setting up for OP HD     Objective:   Vitals:  Blood pressure 100/80, pulse 100, temperature 98.3 °F (36.8 °C), resp. rate 16, height 5' 7\" (1.702 m), weight 233 lb 11 oz (106 kg), SpO2 97 %.   Temp (24hrs), Av.3 °F (36.8 °C), Min:98 °F (36.7 °C), Max:98.5 °F (36.9 °C)    Hemodynamics:   CO:     CI:     CVP: CVP (mmHg): 10 mmHg (21 0700)   SVR:     PAP Systolic:     PAP Diastolic:     PVR:     CQ74:     SCV02:      VAD Interrogation: LVAD (Heartmate)  Pump Speed (RPM): 9400  Pump Flow (LPM): 5.3  Chatter in Lines: No  PI (Pulsitility Index): 4  Power: 5.8  MAP: 82   Test: Yes  Back Up  at Bedside & Labeled: Yes  Power Module Test: Yes  Driveline Site Care: No  Driveline Dressing: Clean, Dry, and Intact    EKG/Rhythm:      Extubation Date / Time:     CT Output:     Ventilator:  Ventilator Volumes  Vt Set (ml): 450 ml (21 0750)  Vt Exhaled (Machine Breath) (ml): 473 ml (21 0750)  Vt Spont (ml): 482 ml (21)  Ve Observed (l/min): 9.5 l/min (21)    Oxygen Therapy:  Oxygen Therapy  O2 Sat (%): 97 % (21 1104)  Pulse via Oximetry: 103 beats per minute (21)  O2 Device: None (Room air) (21 1104)  Skin Assessment: Clean, dry, & intact (21)  Skin Protection for O2 Device: No (21)  O2 Flow Rate (L/min): 1 l/min (21 4279)  O2 Temperature:  (HME) (21 0328)  FIO2 (%): 30 % (21 0228)  ETCO2 (mmHg): 36 mmHg (21 0200)    CXR:    Admission Weight: Last Weight   Weight: 269 lb 10 oz (122.3 kg) Weight: 233 lb 11 oz (106 kg)     Intake / Output / Drain:  Current Shift:  0701 -  1900  In: 740 [P.O.:740]  Out: 0   Last 24 hrs.:     Intake/Output Summary (Last 24 hours) at 2021 1329  Last data filed at 5/21/2021 1206  Gross per 24 hour   Intake 1100 ml   Output 0 ml   Net 1100 ml             No results for input(s): CPK, CKMB, TROIQ in the last 72 hours.   Recent Labs     05/21/21  0029 05/20/21 0445 05/20/21 0443 05/19/21  0454 05/19/21 0453     --  134* 139  --    K 3.7  --  4.0 3.9  --    CO2 27  --  24 27  --    BUN 20  --  35* 27*  --    CREA 3.45*  --  4.77* 3.86*  --    *  --  124* 116*  --    PHOS 3.6 4.2  --   --  3.6   MG 2.1 2.4  --   --  2.4   WBC 4.1  --  4.8 3.5*  --    HGB 8.6*  --  9.0* 9.1*  --    HCT 29.0*  --  30.4* 31.2*  --      --  154 143*  --      Recent Labs     05/21/21  0029 05/20/21 0445 05/19/21 0453   INR 2.9* 3.2* 3.9*   PTP 28.5* 32.0* 38.2*     No lab exists for component: PBNP        Current Facility-Administered Medications:     hydrOXYzine HCL (ATARAX) tablet 25 mg, 25 mg, Oral, TID PRN, Sona Pouch, NP, 25 mg at 05/21/21 0431    albuterol-ipratropium (DUO-NEB) 2.5 MG-0.5 MG/3 ML, 3 mL, Nebulization, Q6H RT, Braxton, Triny B, NP    epoetin amalia-epbx (RETACRIT) injection 20,000 Units, 20,000 Units, SubCUTAneous, Q TUE, THU & SAT, Kyle Pimentel MD, 20,000 Units at 05/20/21 2130    artificial saliva (MOUTH KOTE) 1 Spray, 1 Spray, Oral, PRN, Braxton, Triny B, NP    cephALEXin (KEFLEX) capsule 250 mg, 250 mg, Oral, BID, Braxton, Triny B, NP, 250 mg at 05/21/21 0825    PARoxetine (PAXIL) tablet 20 mg, 20 mg, Oral, DAILY, Festus Ribeiro MD, 20 mg at 05/21/21 0825    hydrocortisone (CORTAID) 1 % cream, , Topical, PRN, Festus Ribeiro MD    senna-docusate (PERICOLACE) 8.6-50 mg per tablet 1 Tab, 1 Tablet, Oral, DAILY, Triny Limon, NP, 1 Tablet at 05/17/21 1237    glucose chewable tablet 16 g, 4 Tablet, Oral, PRN, Elda Pineda MD    dextrose (D50W) injection syrg 12.5-25 g, 25-50 mL, IntraVENous, PRN, Elda Pineda MD    glucagon [facility]) injection 1 mg, 1 mg, IntraMUSCular, PRN, Yadira Reed MD    insulin lispro (HUMALOG) injection, , SubCUTAneous, AC&HS, Steven Tompkins MD, 2 Units at 05/21/21 1212    famotidine (PEPCID) tablet 20 mg, 20 mg, Oral, DAILY, Denise Lei MD, 20 mg at 05/21/21 0825    benzocaine-zinc cl-benzalkonium cl (ORAJEL) 20-0.1-0.02 % mucosal gel, , Oral, PRN, Denise Lei MD    magic mouthwash cpd (without sucralfate), 5 mL, Oral, DAILY PRN, Denise Lei MD, 5 mL at 05/19/21 1507    anidulafungin (ERAXIS) 100 mg in 0.9% sodium chloride 130 mL IVPB, 100 mg, IntraVENous, Q24H, Megha Ibrahim MD, 100 mg at 05/20/21 1830    balsam peru-castor oiL (VENELEX) ointment, , Topical, BID, Denise Lei MD, Given at 05/21/21 0825    albumin human 25% (BUMINATE) solution 50 g, 50 g, IntraVENous, DIALYSIS PRN, Stacey Garner MD    heparin (porcine) 1,000 unit/mL injection 1,900 Units, 1,900 Units, InterCATHeter, DIALYSIS PRN, 1,900 Units at 05/18/21 1522 **AND** heparin (porcine) 1,000 unit/mL injection 1,900 Units, 1,900 Units, InterCATHeter, DIALYSIS PRN, Stacey Garner MD, 1,900 Units at 05/18/21 1521    hydrALAZINE (APRESOLINE) 20 mg/mL injection 20 mg, 20 mg, IntraVENous, Q6H PRN, Braxton, Triny B, NP    hydrALAZINE (APRESOLINE) 20 mg/mL injection 10 mg, 10 mg, IntraVENous, Q6H PRN, Braxton, Triny B, NP, 10 mg at 05/13/21 1052    ranolazine ER (RANEXA) tablet 1,000 mg, 1,000 mg, Oral, BID, Braxton, Triny B, NP, 1,000 mg at 05/21/21 6211    hydrALAZINE (APRESOLINE) tablet 100 mg, 100 mg, Oral, TID, Braxton, Triny B, NP, 100 mg at 05/21/21 0825    isosorbide mononitrate ER (IMDUR) tablet 30 mg, 30 mg, Oral, DAILY, Braxton, Triny B, NP, 30 mg at 05/21/21 0825    tafamidis cap 61 mg (Patient Supplied), 61 mg, Oral, DAILY, Denise Lei MD, 61 mg at 05/21/21 0826    ondansetron (ZOFRAN) injection 4 mg, 4 mg, IntraVENous, Q4H PRN, Valeriy Morrow MD    zinc oxide-cod liver oil (DESITIN) 40 % paste, , Topical, Q12H, See Serna MD, Given at 05/21/21 0900    sodium chloride (NS) flush 5-10 mL, 5-10 mL, IntraVENous, PRN, Lorenzo Slaughter MD    sodium chloride (NS) flush 5-40 mL, 5-40 mL, IntraVENous, Q8H, Bernardo Mckinley MD, 10 mL at 05/20/21 0038    sodium chloride (NS) flush 5-40 mL, 5-40 mL, IntraVENous, PRN, Bernardo Mckinley MD    acetaminophen (TYLENOL) tablet 650 mg, 650 mg, Oral, Q6H PRN **OR** acetaminophen (TYLENOL) suppository 650 mg, 650 mg, Rectal, Q6H PRN, Bernardo Mckinley MD    Warfarin - Pharmacy to Dose, , Other, Rx Dosing/Monitoring, Jayla Walter, NP    sodium chloride (NS) flush 5-40 mL, 5-40 mL, IntraVENous, Q8H, Braxton, Triny B, NP, 40 mL at 05/21/21 1311    sodium chloride (NS) flush 5-40 mL, 5-40 mL, IntraVENous, PRN, Braxton, Triny B, NP    nystatin (MYCOSTATIN) 100,000 unit/gram powder, , Topical, PRN, Dennis Cortez MD    A/P  S/P LVAD - good flows  Renal failure - HD  Urosepsis - Abx's  Need for anticoagulation - coumadin      Risk of morbidity and mortality - high  Medical decision making - high complexity    Signed By: Jennifer Mederos MD

## 2021-05-21 NOTE — PROGRESS NOTES
1930: Bedside and Verbal shift change report given to [address] (oncoming nurse) by Pooja Paul (offgoing nurse). Report included the following information SBAR, Kardex, Intake/Output and Recent Results    0400: Central line dressing change performed on R chest HD port due to self-removal of dressing. Line appears intact and remains partially covered. Sterile dressing change completed per protocol. 0500: Blader scanned, showed 70mL, Straight cath to obtain urine specimen attempted. This RN unable, another staff RN attemped with the kit provided by the floor and a smaller size straight cath as well. Unable to obtain sample. 0730: Bedside and Verbal shift change report given to Pooja Paul (oncoming nurse) by Gera Flores RN (offgoing nurse).  Report included the following information SBAR, Kardex, Intake/Output and Recent Results

## 2021-05-21 NOTE — PROGRESS NOTES
ID Progress Note  2021    Subjective:     Feeling better overall    Objective:     Antibiotics:  1. Vancomycin   2. Eraxis   3. Zosyn       Vitals:   Visit Vitals  /80 Comment: treatment ended. Blood returned   Pulse 89   Temp 99.1 °F (37.3 °C)   Resp 18   Ht 5' 7\" (1.702 m)   Wt 106 kg (233 lb 11 oz)   SpO2 96%   BMI 36.60 kg/m²        Tmax:  Temp (24hrs), Av.4 °F (36.9 °C), Min:98 °F (36.7 °C), Max:99.1 °F (37.3 °C)      Exam:  Lungs:  clear to auscultation bilaterally  Heart:  regular rate and rhythm  Abdomen:  soft, non-tender. Bowel sounds normal. No masses,  no organomegaly    Labs:      Recent Labs     21  0029 21  0443 21  0454   WBC 4.1 4.8 3.5*   HGB 8.6* 9.0* 9.1*    154 143*   BUN 20 35* 27*   CREA 3.45* 4.77* 3.86*   AP 58 52 48   TBILI 0.6 0.6 0.7       Cultures:     No results found for: SDES  Lab Results   Component Value Date/Time    Culture result: CANDIDA ALBICANS (A) 2021 11:29 AM    Culture result: RARE DIPHTHEROIDS (A) 2021 04:29 AM    Culture result: LIGHT YEAST, (APPARENT CANDIDA ALBICANS) (A) 2021 07:09 PM    Culture result: LIGHT NORMAL RESPIRATORY BETO 2021 07:09 PM       Radiology:     Line/Insert Date:           Assessment:     1. Chronic SWI  2. Candida UTI--can treat with fluconazole if able  3. LVAD infection     Objective:     1. Contraindications to fluconazole--will continue Eraxis until discharge or , whichever comes first--will not need treatment past the time of discharge   2. D/W daughter at bedside  3.  PRN this weekend    Danny Butler MD

## 2021-05-21 NOTE — PROGRESS NOTES
Pharmacist Note  Warfarin Dosing  Consult provided for this 78 y. o.female to manage warfarin for LVAD    INR Goal: 1.8-2.5  Home regimen/ tablet size: 5 mg daily    Drugs that may increase INR: None  Drugs that may decrease INR: None  Other current anticoagulants/ drugs that may increase bleeding risk: heparin  Risk factors: Age > 65, dialysis  Daily INR ordered: YES    Recent Labs     05/21/21  0029 05/20/21  0445 05/20/21  0443 05/19/21  0454 05/19/21  0453   HGB 8.6*  --  9.0* 9.1*  --    INR 2.9* 3.2*  --   --  3.9*     Date               INR                  Dose  5/11  3.3  hold   5/12                1.9                    2 mg      5/13                 1.4                   4 mg     5/14                 1.4                   6 mg      5/15  1.9  4 mg  5/16  3.1  HOLD  5/17                 4.1                  HOLD     5/18                 4.2                  HOLD   5/19                 3.9                  HOLD   5/20  3.2  HOLD  5/21  2.9  HOLD                                                                                 Assessment/ Plan: Will HOLD warfarin today     Pharmacy will continue to monitor daily and adjust therapy as indicated. Please contact the pharmacist at x [phone] or  for outpatient recommendations if needed.

## 2021-05-21 NOTE — PROGRESS NOTES
Problem: Self Care Deficits Care Plan (Adult)  Goal: *Acute Goals and Plan of Care (Insert Text)  Description:   FUNCTIONAL STATUS PRIOR TO ADMISSION: Patient was modified independent using a rollator for functional mobility prior to initial hospitalization in [facility] and transfer to [facility]. HOME SUPPORT: The patient lived alone with LTC aides to assist her however now her daughter plans to live with her post discharge. Occupational Therapy Goals  Initiated 5/17/2021  1. Patient will perform grooming with modified independence within 7 day(s). 2.  Patient will perform bathing with moderate assistance  within 7 day(s). 3.  Patient will perform lower body dressing with moderate assistance  within 7 day(s). 4.  Patient will perform toilet transfers with minimal assistance/contact guard assist within 7 day(s). 5.  Patient will perform all aspects of toileting with moderate assistance  within 7 day(s). 6.  Patient will participate in upper extremity therapeutic exercise/activities with supervision/set-up for 5 minutes within 7 day(s). 7.  Patient will utilize energy conservation techniques during functional activities with verbal cues within 7 day(s). Outcome: Not Met   OCCUPATIONAL THERAPY TREATMENT  Patient: Estella Oneill (51 y.o. female)  Date: 5/21/2021  Diagnosis: Acute encephalopathy [G93.40] Acute encephalopathy       Precautions: Fall (prior LVAD)  Chart, occupational therapy assessment, plan of care, and goals were reviewed. ASSESSMENT  Patient continues with skilled OT services and is slowly progressing towards goals. She is limited with ADL tasks with impaired activity tolerance, mobility, and standing balance. Received in chair. Patient completed switch over to batteries, min cues as she was distracted with previously used batteries in her pack. She completed sit > stand with min Ax2 with incontinence of liquid stool with standing.   Self care transfer with RW to [facility] with min A x 2.  Total A for hygiene and brief mgmt, returned to chair following toileting. Attempted additional therapy activity however patient reporting fatigue and need for rest.     Current Level of Function Impacting Discharge (ADLs): bowel hygiene total A, self care transfers min Ax2    Other factors to consider for discharge: Patient with extensive medical history and multiple admissions. Daughter present and supportive. They plan for patient to dc home to patient's house with support from family. They own w/c, raised toilet seat, lift chair and dressing aides. PLAN :  Patient continues to benefit from skilled intervention to address the above impairments. Continue treatment per established plan of care to address goals. Recommend with staff: x2 assist for RW for self care transfer to chair and BSC    Recommend next OT session: brief prior to standing    Recommendation for discharge: (in order for the patient to meet his/her long term goals)  To be determined: IPR vs HH-  patient would benefit from rehab however daughter expressed desire to dc home with support from family    This discharge recommendation:  Has not yet been discussed the attending provider and/or case management    IF patient discharges home will need the following DME: patient owns DME required for discharge       SUBJECTIVE:   Patient stated .    OBJECTIVE DATA SUMMARY:   Cognitive/Behavioral Status:  Neurologic State: Alert;Confused  Orientation Level: Oriented to person;Disoriented to situation;Disoriented to place; Disoriented to time  Cognition: Follows commands; Impaired decision making             Functional Mobility and Transfers for ADLs:  Bed Mobility:       Transfers:  Sit to Stand: Minimum assistance;Assist x2  Functional Transfers  Toilet Transfer : Minimum assistance;Assist x2  Bed to Chair: Minimum assistance;Assist x2    Balance:  Sitting: Impaired  Sitting - Static: Fair (occasional)  Sitting - Dynamic: Fair (occasional)  Standing: Impaired  Standing - Static: Fair  Standing - Dynamic : Fair    ADL Intervention:   Patient instructed and indicated understanding the benefits of maintaining activity tolerance, functional mobility, and independence with self care tasks during acute stay  to ensure safe return home and to baseline. Encouraged patient to increase frequency and duration OOB, be out of bed for all meals, perform daily ADLs (as approved by RN/MD regarding bathing etc), and performing functional mobility to/from bathroom. Provided education through multi-modal cues for RW safety including proper positioning, hand placement,  and safety. Patient able to perform sit <> stand with Fair  assistance. Fair understanding of RW use and safety. Provided education on energy conservation techniques in regards to ADL/IADL tasks. Discussion with examples of pacing, planning, completion of heavy activity during strongest part of day, seated vs standing, and asking for assistance as needed. Patient with good participation in discussion and fair understanding. Grooming  Position Performed: Seated in chair  Brushing/Combing Hair: Set-up; Supervision    Lower Body Dressing Assistance  Socks: Total assistance (dependent) (verbalized understanding of sock aide, not available this se)    Toileting  Bowel Hygiene: Total assistance (dependent)  Clothing Management: Total assistance (dependent)      Pain:  No c/o pain    Activity Tolerance:   Fair, Poor, and requires rest breaks    After treatment patient left in no apparent distress:   Sitting in chair, Call bell within reach, and Caregiver / family present    COMMUNICATION/COLLABORATION:   The patients plan of care was discussed with: Physical therapy assistant and Registered nurse.      Camacho Ramon OT  Time Calculation: 41 mins

## 2021-05-21 NOTE — PROGRESS NOTES
Due to lack of equipment to put the CPT vest machine together, the patient was given an [employer] PEP device to help mobilize secretions.

## 2021-05-21 NOTE — PROGRESS NOTES
[facility] in [place], [place]  Inpatient Consult Progress Note      Patient name: Manuel Cartwright  Patient : 1950  Patient MRN: [id number]  Consulting MD: Unruly Simpson MD  Date of service: 21    CHIEF COMPLAINT:  Chief Complaint   Patient presents with    Altered mental status        PLAN:  Continue set speed 9400rpm, low speed 9000rpm   Cont home dose hydralazine/Imdur  Cont Tafamidis 61mg daily  Intolerant to BB/ACEi/ARB/ARNI due to aTTR  Intolerant to MRA due to hyperkalemia  Volume management per Nephrology; strict I/O, standing weights  Pulmonary hygiene including IS and nebulizers   Start Chest PT  Cont Eraxis until . ID recommendations appreciated for C. Albicans in urine and sputum   Blood cultures negative   Drive line culture- rare diphtheroids   Repeat Urine culture via straight cath  Coumadin dose per pharmacy; goal INR lowered to 1.8-2.5 due to GIB  Resume home keflex dose- d/w with ID  Appreciate EP recommendations- no changes to current therapy.    TSH 1.60- monitor for now   Bowel regimen  Advance diet   PT/OT   Standing weights only  Cont to avoid O2 on patient due to intolerance   Up to date on flu, PNA, and COVID vaccinations   Will need to start DC plan for home with PT/OT /monday      IMPRESSION:  Likely urosepsis  Acute respiratory failure   Chronic RV failure  Coronary artery disease  · [facility] (2016) high grade ramus and small PDA disease, borderline disease of LAD and takeoff of pRCA  Chronic systolic heart failure  · Stage C, NYHA class IV improved to IIIA symptoms with LVAD  · Combined ischemic and non-ischemic cardiomyopathy, 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 Arias Amaya)  · C/b delayed extubation due to severe COPD  · C/b critical illness polyneuropathy  · C/b prolonged hospitalization post-LVAD, 55 days  · C/b sternal wound infection s/p debridement (by Dr. Robyn Templeton) s/p wound vac  · C/b sacral ulcer  · Would culture positive for Staph aureus, not MRSA  · C/b LVAD site drainage, improved  S/p CRT-ICD  · ICD fired due to electrolyte imbalance (2011)  H/o breast cancer (1992)   · s/p bilateral mastectomy/chemo and endometrial cancer s/p hysterectomy  · Lymphedema of LLE due to cancer treatment  Severe COPD with FEV1 50%  Depression  Atrial fibrillation  H/o \"two mini strokes\"  S/p fall with hip facture   · Right hip hemmiarthroplasty (5/23/18) by Dr. Carley Cason)  · S/p removed hip hardwarare due to pain (4/15/19)  COPD severe  CKD, stage 4- on HD  Hyperkalemia, resolved  Pulmonary hypertension  Cardiac risk factors:  · Morbid obesity, Body mass index is 42.29 kg/m². · DM2 insulin dependent  · JED on Trilogy  · HL  Urinary incontinence, severe  · no procedures due to anticoagulation  Endometrial cancer (2002)  HTN  HL    Recent Events:  Slept well  No acute overnight events  PCT normalized      CARDIAC IMAGING:  TTE 5/11/21 LVEF < 15%, LVIDd 5.96cm, TAPSE 0.99cm, RVIDd 4.14cm  TTE 4/19 shows improved LVIDd, 6.68 cm with RVIDd 5.14 cm and TAPSE 1.1 cm   TTE 4/28/21 LVIDd 7.37cm, RVIDd 5.27cm, TAPSE 1.44cm   Echo (9/24/19) LVEF 20-25%, AV opens 1:1, no AR  Echo (5/29/18) LVEF 10-%, ramps study done, report in epic  Echo (1/9/18) ramp study done, LVEDD 7.1cm  LHC (11/9/18) 2 vessel disease with 90% OM, 80% PDA, DSA to PDA branch  RHC 4/20 showed adequate Sal CI 3.0 but severely elevated RA pressure 24 mmHg  CXR negative 4/27/21     INTERVAL HISTORY:  Ms. Roberta Marcano is a 78 y.o. Female s/p LVAD implant with HM 2 and ESRD requiring HD that was recently admitted for RV failure and worsening renal function. She was discharged on 5/5/21 to [facility] for rehabilitation before transitioning home to live her with daughter.  In the last few days she had worsening mental status changed, decreased UOP and respiratory distress after possible aspiration- she was intubated and admitted to CV for further management. LVAD INTERROGATION:  Device interrogated in person  Device function normal, normal flow, no events  LVAD   Pump Speed (RPM): 9400  Pump Flow (LPM): 5.3  MAP: 82  PI (Pulsitility Index): 4  Power: 5.8   Test: Yes  Back Up  at Bedside & Labeled: Yes  Power Module Test: Yes  Driveline Site Care: No  Driveline Dressing: Clean, Dry, and Intact  Outpatient: No  MAP in Therapeutic Range (Outpatient): Yes  Testing  Alarms Reviewed: Yes  Back up SC speed: 9400  Back up Low Speed Limit: 9000  Emergency Equipment with Patient?: Yes  Emergency procedures reviewed?: Yes  Drive line site inspected?:  (covered by dressing)  Drive line intergrity inspected?: Yes  Drive line dressing changed?: No    PHYSICAL EXAM:  Visit Vitals  /80 Comment: treatment ended. Blood returned   Pulse 100   Temp 98.3 °F (36.8 °C)   Resp 16   Ht 5' 7\" (1.702 m)   Wt 233 lb 11 oz (106 kg)   SpO2 97%   BMI 36.60 kg/m²     General: Patient is well developed, well-nourished in no acute distress, sedated   HEENT: Normocephalic and atraumatic. No scleral icterus. Pupils are equal, round and reactive to light and accomodation. No conjunctival injection. Oropharynx is clear. Neck: Supple. No evidence of thyroid enlargements or lymphadenopathy. JVD: Cannot be appreciated   Lungs: Breath sounds are equal and clear bilaterally. No wheezes, rhonchi, or rales. On Vent  Heart: Regular rate and rhythm with normal S1 and S2. No murmurs, gallops or rubs. Abdomen: Soft, no mass or tenderness. No organomegaly or hernia. Bowel sounds present. Genitourinary and rectal: deferred  Extremities: No cyanosis, clubbing, or edema. Neurologic: No focal sensory or motor deficits are noted. Grossly intact. Psychiatric: Awake, alert an doriented x 3. Appropriate mood and affect. Skin: Warm, dry and well perfused.  No lesions, nodules or rashes are noted. REVIEW OF SYSTEMS:  Review of Systems   Constitutional: Positive for malaise/fatigue. Negative for chills and fever. Respiratory: Negative for cough and shortness of breath. Cardiovascular: Negative for chest pain and leg swelling. Gastrointestinal: Negative for heartburn and nausea. Genitourinary: Negative. Musculoskeletal: Negative. Neurological: Negative. Endo/Heme/Allergies: Negative. Psychiatric/Behavioral: The patient does not have insomnia. PAST MEDICAL HISTORY:  Past Medical History:   Diagnosis Date    Asthma     Cancer ([facility]     breast    Cancer ([facility]     endometrial    Congestive heart failure, unspecified     CRI (chronic renal insufficiency)     Depression     Diabetes ([facility]     Hypertension     [facility] 5/11/2021       PAST SURGICAL HISTORY:  Past Surgical History:   Procedure Laterality Date    HX HERNIA REPAIR      HX HYSTERECTOMY      HX MASTECTOMY      IR INSERT NON TUNL CVC OVER 5 YRS  4/26/2021    IR INSERT TUNL CVC W/O PORT OVER 5 YR  5/5/2021       FAMILY HISTORY:  No family history on file. SOCIAL HISTORY:  Social History     Socioeconomic History    Marital status:      Spouse name: Not on file    Number of children: Not on file    Years of education: Not on file    Highest education level: Not on file   Tobacco Use    Smoking status: Never Smoker    Smokeless tobacco: Never Used   Substance and Sexual Activity    Alcohol use: Not Currently     Social Determinants of Health     Financial Resource Strain:     Difficulty of Paying Living Expenses:    Food Insecurity:     Worried About Running Out of Food in the Last Year:     [address] in the Last Year:    Transportation Needs:     Lack of Transportation (Medical):      Lack of Transportation (Non-Medical):    Physical Activity:     Days of Exercise per Week:     Minutes of Exercise per Session: Stress:     Feeling of Stress :    Social Connections:     Frequency of Communication with Friends and Family:     Frequency of Social Gatherings with Friends and Family:     Attends Druze Services:     Active Member of Clubs or Organizations:     Attends Club or Organization Meetings:     Marital Status:        LABORATORY RESULTS:     Labs Latest Ref Rng & Units 5/21/2021 5/20/2021 5/19/2021 5/18/2021 5/17/2021 5/16/2021 5/15/2021   WBC 3.6 - 11.0 K/uL 4.1 4.8 3.5(L) 3.7 3.7 4.1 4.5   RBC 3.80 - 5.20 M/uL 3.18(L) 3.35(L) 3.38(L) 3.29(L) 3.08(L) 3.22(L) 2.88(L)   Hemoglobin 11.5 - 16.0 g/dL 8.6(L) 9.0(L) 9.1(L) 9.0(L) 8.6(L) 8.8(L) 7. 7(L)   Hematocrit 35.0 - 47.0 % 29. 0(L) 30. 4(L) 31. 2(L) 30. 0(L) 28. 3(L) 29. 9(L) 26. 8(L)   MCV 80.0 - 99.0 FL 91.2 90.7 92.3 91.2 91.9 92.9 93.1   Platelets 953 - 675 K/uL 152 154 143(L) 139(L) 127(L) 134(L) 126(L)   Lymphocytes 12 - 49 % 11(L) 11(L) 11(L) 5(L) 11(L) 9(L) 9(L)   Monocytes 5 - 13 % 9 8 13 12 13 11 11   Eosinophils 0 - 7 % 0 0 0 0 0 0 0   Basophils 0 - 1 % 1 1 1 0 1 0 0   Bands 0 - 6 % - - - - - - 8(H)   Albumin 3.5 - 5.0 g/dL 2. 9(L) 3.0(L) 3.0(L) 3. 2(L) 3. 1(L) 3.4(L) 3. 2(L)   Calcium 8.5 - 10.1 MG/DL 8.8 9.0 8.8 8.7 8.7 9.1 8.8   Glucose 65 - 100 mg/dL 142(H) 124(H) 116(H) 130(H) 111(H) 119(H) 118(H)   BUN 6 - 20 MG/DL 20 35(H) 27(H) 51(H) 41(H) 28(H) 49(H)   Creatinine 0.55 - 1.02 MG/DL 3.45(H) 4.77(H) 3.86(H) 5.74(H) 4.88(H) 3.63(H) 4.79(H)   Sodium 136 - 145 mmol/L 136 134(L) 139 135(L) 136 136 135(L)   Potassium 3.5 - 5.1 mmol/L 3.7 4.0 3.9 4.0 4.2 4.0 4.4   TSH 0.36 - 3.74 uIU/mL - - - - - - 1.60   LDH 81 - 246 U/L 214 216 186 199 156 160 151   Some recent data might be hidden     Lab Results   Component Value Date/Time    TSH 1.60 05/15/2021 03:14 AM    TSH 5.36 (H) 04/24/2021 04:32 AM    TSH 3.99 (H) 04/17/2021 04:54 AM    TSH 2.980 10/01/2020 12:00 AM       ALLERGY:  Allergies   Allergen Reactions    Benzodiazepines Other (comments)     Respiratory issues        CURRENT MEDICATIONS:    Current Facility-Administered Medications:     hydrOXYzine HCL (ATARAX) tablet 25 mg, 25 mg, Oral, TID PRN, Aggarwal Lorne, NP, 25 mg at 05/21/21 0431    albuterol-ipratropium (DUO-NEB) 2.5 MG-0.5 MG/3 ML, 3 mL, Nebulization, Q6H RT, Braxton, Triny B, NP    epoetin amalia-epbx (RETACRIT) injection 20,000 Units, 20,000 Units, SubCUTAneous, Q TUE, THU & SAT, Reyes Fern R, MD, 20,000 Units at 05/20/21 2130    artificial saliva (MOUTH KOTE) 1 Spray, 1 Spray, Oral, PRN, Braxton, Triny B, NP    cephALEXin (KEFLEX) capsule 250 mg, 250 mg, Oral, BID, Braxton, Triny B, NP, 250 mg at 05/21/21 0825    PARoxetine (PAXIL) tablet 20 mg, 20 mg, Oral, DAILY, Festus Ribeiro MD, 20 mg at 05/21/21 0825    hydrocortisone (CORTAID) 1 % cream, , Topical, PRN, Festus Ribeiro MD    senna-docusate (PERICOLACE) 8.6-50 mg per tablet 1 Tab, 1 Tablet, Oral, DAILY, Braxton, Triny B, NP, 1 Tablet at 05/17/21 1237    glucose chewable tablet 16 g, 4 Tablet, Oral, PRN, Elda Pineda MD    dextrose (D50W) injection syrg 12.5-25 g, 25-50 mL, IntraVENous, PRN, Elda Pineda MD    glucagon [facility]) injection 1 mg, 1 mg, IntraMUSCular, PRN, Elda Pineda MD    insulin lispro (HUMALOG) injection, , SubCUTAneous, AC&HS, Gudelia Moya MD, 2 Units at 05/20/21 1152    famotidine (PEPCID) tablet 20 mg, 20 mg, Oral, DAILY, Luci Allen MD, 20 mg at 05/21/21 0825    benzocaine-zinc cl-benzalkonium cl (ORAJEL) 20-0.1-0.02 % mucosal gel, , Oral, PRN, Luci Allen MD    magic mouthwash cpd (without sucralfate), 5 mL, Oral, DAILY PRN, Luci Allen MD, 5 mL at 05/19/21 1507    anidulafungin (ERAXIS) 100 mg in 0.9% sodium chloride 130 mL IVPB, 100 mg, IntraVENous, Q24H, Rah Still MD, 100 mg at 05/20/21 1830    balsam peru-castor oiL (VENELEX) ointment, , Topical, BID, Luci Allen MD, Given at 05/21/21 0825    albumin human 25% (BUMINATE) solution 50 g, 50 g, IntraVENous, DIALYSIS PRN, Lynda Nicole MD    heparin (porcine) 1,000 unit/mL injection 1,900 Units, 1,900 Units, InterCATHeter, DIALYSIS PRN, 1,900 Units at 05/18/21 1522 **AND** heparin (porcine) 1,000 unit/mL injection 1,900 Units, 1,900 Units, InterCATHeter, DIALYSIS PRN, Jaspreet Mendoza MD, 1,900 Units at 05/18/21 1521    hydrALAZINE (APRESOLINE) 20 mg/mL injection 20 mg, 20 mg, IntraVENous, Q6H PRN, Braxton, Triny B, NP    hydrALAZINE (APRESOLINE) 20 mg/mL injection 10 mg, 10 mg, IntraVENous, Q6H PRN, Braxton, Triny B, NP, 10 mg at 05/13/21 1052    ranolazine ER (RANEXA) tablet 1,000 mg, 1,000 mg, Oral, BID, Braxton, Triny B, NP, 1,000 mg at 05/21/21 1830    hydrALAZINE (APRESOLINE) tablet 100 mg, 100 mg, Oral, TID, Braxton, Triny B, NP, 100 mg at 05/21/21 0825    isosorbide mononitrate ER (IMDUR) tablet 30 mg, 30 mg, Oral, DAILY, Braxton, Triny B, NP, 30 mg at 05/21/21 0825    tafamidis cap 61 mg (Patient Supplied), 61 mg, Oral, DAILY, Danyelle Richey MD, 61 mg at 05/21/21 0826    ondansetron (ZOFRAN) injection 4 mg, 4 mg, IntraVENous, Q4H PRN, Valeriy Morrow MD    zinc oxide-cod liver oil (DESITIN) 40 % paste, , Topical, Q12H, Duane Hoes T, MD, Given at 05/21/21 0900    sodium chloride (NS) flush 5-10 mL, 5-10 mL, IntraVENous, PRN, Radha Antunez MD    sodium chloride (NS) flush 5-40 mL, 5-40 mL, IntraVENous, Q8H, Candice Hernandez MD, 10 mL at 05/20/21 6599    sodium chloride (NS) flush 5-40 mL, 5-40 mL, IntraVENous, PRN, Candice Hernandez MD    acetaminophen (TYLENOL) tablet 650 mg, 650 mg, Oral, Q6H PRN **OR** acetaminophen (TYLENOL) suppository 650 mg, 650 mg, Rectal, Q6H PRN, Candice Hernandez MD    Warfarin - Pharmacy to Dose, , Other, Rx Dosing/Monitoring, Yady Walter, NP    sodium chloride (NS) flush 5-40 mL, 5-40 mL, IntraVENous, Q8H, Triny Limon, NP, 10 mL at 05/21/21 0511    sodium chloride (NS) flush 5-40 mL, 5-40 mL, IntraVENous, PRN, Triny Limon B, NP    nystatin (MYCOSTATIN) 100,000 unit/gram powder, , Topical, PRN, Dennis Cortez MD    PATIENT CARE TEAM:  Patient Care Team:  Camacho Pham NP as PCP - General (Nurse Practitioner)  Aura Mckinney MD (Cardiology)  Dante Yuen MD as Physician (Cardiology)  Bryan Morrison LPN as Care Coordinator  Kevin Cheema MD (Cardiothoracic Surgery)  Ester Zhou MD (Cardiology)     Thank you for allowing me to participate in this patient's care. Raj Medina NP  [facility] [address], [address]  Phone: [phone]    [facility] ATTENDING ADDENDUM    Patient was seen and examined in person. Data and notes were reviewed. I have discussed and agree with the plan as noted in the NP note above without further additions.     Christa Merlos MD PhD  [facility] [phone]

## 2021-05-21 NOTE — PROGRESS NOTES
05/21/21 0228   Oxygen Therapy   O2 Sat (%) 96 %   Pulse via Oximetry 103 beats per minute   O2 Device BIPAP   Skin Assessment Clean, dry, & intact   Skin Protection for O2 Device No   FIO2 (%) 30 %   CPAP/BIPAP   CPAP/BIPAP Start/Stop On   Device Mode AVAP   $$ Bipap Daily Yes   AVAP Set Resp Rate 5   AVAP Set VT (ml) 465   Bio[employer] ID # [id number]   Mask Type and Size Large;Nasal mask   Skin Condition Intact   IPAP (cm H2O)   (PS max 20 min 6)   EPAP (cm H2O) 5 cm H2O   Inspiratory Time (sec) 1.3 seconds   Vt Spont (ml) 482 ml   Ve Observed (l/min) 9.5 l/min   Backup Rate 15   Total RR (Spontaneous) 20 breaths per minute   Insp Rise Time (sec) 3   Leak (Estimated) 9 L/min     RT S/U NIPPV. Patient lost part of her home mask therefore unable to use her Home Trilogy. Patient understands as she has used our machine in the past. Patient states her family will take care of getting her a new part for her mask.

## 2021-05-21 NOTE — PROGRESS NOTES
Problem: Mobility Impaired (Adult and Pediatric)  Goal: *Acute Goals and Plan of Care (Insert Text)  Description: FUNCTIONAL STATUS PRIOR TO ADMISSION: Patient was modified independent using a rollator for functional mobility. HOME SUPPORT PRIOR TO ADMISSION: The patient lived alone with her daughter to provide assistance as needed. Recent prolonged admission to [facility], discharged to rehab then readmitted from rehab. Physical Therapy Goals  Initiated 5/16/2021  1. Patient will move from supine to sit and sit to supine  and scoot up and down in bed with minimal assistance/contact guard assist within 7 day(s). 2.  Patient will transfer from bed to chair and chair to bed with supervision/set-up using the least restrictive device within 7 day(s). 3.  Patient will perform sit to stand with supervision/set-up within 7 day(s). 4.  Patient will ambulate with supervision/set-up for 50 feet with the least restrictive device within 7 day(s). 5.  Patient will ascend/descend 1 stairs without handrail(s) with supervision/set-up within 7 day(s). Outcome: Progressing Towards Goal    PHYSICAL THERAPY TREATMENT  Patient: Hayley Hernandez (89 y.o. female)  Date: 5/21/2021  Diagnosis: Acute encephalopathy [G93.40] Acute encephalopathy       Precautions: Fall (prior LVAD)  Chart, physical therapy assessment, plan of care and goals were reviewed. ASSESSMENT  Patient continues with skilled PT services and is progressing towards goals. Pt is agreeable reporting feeling better agreeable to ambulate. Pt had liquid stool with initial stand. Pt required assistance for self care. Pt reports fatigue post BSC and self care. Attempted gait post rest. Pt declined due to decrease LE stability.  Pt repositioned in chair  Will attempt to treat on Sunday due to family request   Current Level of Function Impacting Discharge (mobility/balance): min Ax2    Other factors to consider for discharge: decrease mobility          PLAN :  Patient continues to benefit from skilled intervention to address the above impairments. Continue treatment per established plan of care. to address goals. Recommendation for discharge: (in order for the patient to meet his/her long term goals)  Physical therapy at least 2 days/week in the home AND ensure assist and/or supervision for safety with mobility and ADLs if unable then inpt rehab     This discharge recommendation:  Has not yet been discussed the attending provider and/or case management    IF patient discharges home will need the following DME: patient owns DME required for discharge       SUBJECTIVE:   Patient stated I am falling backwards.     OBJECTIVE DATA SUMMARY:   Critical Behavior:  Neurologic State: Alert, Confused  Orientation Level: Oriented to person, Disoriented to situation, Disoriented to place, Disoriented to time  Cognition: Follows commands, Impaired decision making  Safety/Judgement: Decreased insight into deficits, Decreased awareness of need for safety, Decreased awareness of need for assistance  Functional Mobility Training:  Bed Mobility:                    Transfers:  Sit to Stand: Minimum assistance;Assist x2  Stand to Sit: Minimum assistance;Assist x2        Bed to Chair: Minimum assistance;Assist x2                    Balance:  Sitting: Impaired  Sitting - Static: Fair (occasional)  Sitting - Dynamic: Fair (occasional)  Standing: Impaired  Standing - Static: Fair  Standing - Dynamic : Fair  Ambulation/Gait Training:  Distance (ft): 4 Feet (ft)  Assistive Device: Gait belt;Walker, rolling  Ambulation - Level of Assistance: Minimal assistance;Assist x2        Gait Abnormalities: Decreased step clearance        Base of Support: Widened        Step Length: Left shortened;Right shortened                   Stairs:               Therapeutic Exercises:     Pain Rating:  No complaints     Activity Tolerance:   requires frequent rest breaks    After treatment patient left in no apparent distress:   Sitting in chair, Call bell within reach, Bed / chair alarm activated, and Caregiver / family present    COMMUNICATION/COLLABORATION:   The patients plan of care was discussed with: Registered nurse.      Erick Wade PTA   Time Calculation: 38 mins

## 2021-05-21 NOTE — PROGRESS NOTES
Hospitalist Progress Note  Lemon Holter, MD  Answering service: [phone] OR [phone] from in house phone      Date of Service:  2021  NAME:  Emiliano Guan  :  1950  MRN:  [id number]    Admission Summary: This is a 29-year-old female who has advanced at heart failure with ventricular assist device support is admitted for dyspnea. She is found to have sepsis on further work-up. Interval history / Subjective: Follow-up for dyspnea/CHF/sepsis   Patient complains of gas pain, loose bowel movement and bowel distention. Said she has had a days for more than a week. She stated simethicone helps, ordered. Discussed with daughter at the bedside. Assessment & Plan:     #. Sepsis likely source chronic surgical wound infection.   -Now back on home Keflex now. #Candida UTI: Continue Eraxis. ID help appreciated. #. Acute on chronic respiratory failure :due to sepsis, chf and severe copd- resolved. - extubated. doing well on her home trilogy ventilator at night. #. Chronic RV failure  #. CAD: home regimen  #. Chronic sCHF: BiVentricular , s/p LVAD, s/p ICD. 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 H/o breast cancer () - s/p bilateral mastectomy/chemo   #. Lymphedema of LLE due to cancer treatment  #. COPD: no acute exacerbation. FEV1 50%  #. AFib: AC as above   #. ROCIO on CKD stage 4: progressive CKD and CRS- started HD   - Nephro following: R- IJ permacath in situ- OP HD set up at [place], [place]     #. Hyperkalemia, resolved  #. Pulmonary hypertension  #. Morbid obesity, BMI 42.29 kg/m².   #. DM2: A1c 7.2, SSI, AccuChecks, monitor  #. JED on Trilogy  #. Urinary incontinence, severe  #. Endometrial cancer (2002)- s/p Hysterectomy  #. HTN: chronic, stable, Home regimen, PRN BP meds. Monitor  #. HLD: chronic, Stable, Home regimen  #Abdominal distention with gas pain: As needed simethicone. Check KUB    Discharge planning:  -Patient accepted to [address] [facility] [facility] [facility] dialysis unit but available time only at 645 Monday Wednesday Friday, currently on a TTS schedule, patient and family session be difficult for them to arrange transportation. CM working on to set up for [facility] [place] 2nd/3rd shift on TTS    Code status: Full  DVT prophylaxis: warfarin  Care Plan discussed with: Patient/Family and Nurse  Disposition: Home with home health PT OT,in 2-3 days . She will finish Eraxis on 5/23     Hospital Problems  Date Reviewed: 4/25/2021        Codes Class Noted POA    Severe sepsis with acute organ dysfunction [facility]) ICD-10-CM: A41.9, R65.20  ICD-9-CM: 038.9, 995.92 Acute 5/11/2021 Yes        * (Principal) Acute encephalopathy ICD-10-CM: G93.40  ICD-9-CM: 348.30  5/11/2021 Unknown            Review of Systems:   Pertinent items are mentioned in interval history. Vital Signs:    Last 24hrs VS reviewed since prior progress note. Most recent are:  Visit Vitals  /80   Pulse 89   Temp 99.1 °F (37.3 °C)   Resp 18   Ht 5' 7\" (1.702 m)   Wt 106 kg (233 lb 11 oz)   SpO2 96%   BMI 36.60 kg/m²         Intake/Output Summary (Last 24 hours) at 5/21/2021 1645  Last data filed at 5/21/2021 1206  Gross per 24 hour   Intake 1100 ml   Output 0 ml   Net 1100 ml        Physical Examination:   Evaluated face to face and examined 05/21/21    General:   Sitting in a wheelchair by the bedside. Alert oriented x4. Not in distress. Card:  humming VAD sound. good peripheral perfusion  Resp:  No accessory muscle use, fair AE, no wheezes. no crepitations  Abd:   Distended, normoactive, nontender. Extremities:  No cyanosis or clubbing, edema present. . Venous insufficiency discoloration. Neuro:  Grossly normal, no focal neuro deficits, follows commands, speech wnl. Psych:  Good insight, not agitated. Data Review:    Review and/or order of clinical lab test  Review and/or order of tests in the radiology section of [facility]  Review and/or order of tests in the medicine section of [facility]  Labs:     Recent Labs     05/21/21 0029 05/20/21 0443   WBC 4.1 4.8   HGB 8.6* 9.0*   HCT 29.0* 30.4*    154     Recent Labs     05/21/21  0029 05/20/21 0445 05/20/21 0443 05/19/21  0454 05/19/21 0453     --  134* 139  --    K 3.7  --  4.0 3.9  --      --  100 104  --    CO2 27  --  24 27  --    BUN 20  --  35* 27*  --    CREA 3.45*  --  4.77* 3.86*  --    *  --  124* 116*  --    CA 8.8  --  9.0 8.8  --    MG 2.1 2.4  --   --  2.4   PHOS 3.6 4.2  --   --  3.6     Recent Labs     05/21/21 0029 05/20/21 0443 05/19/21 0454   ALT 8* 9* 8*   AP 58 52 48   TBILI 0.6 0.6 0.7   TP 7.3 7.5 7.3   ALB 2.9* 3.0* 3.0*   GLOB 4.4* 4.5* 4.3*     Recent Labs     05/21/21 0029 05/20/21 0445 05/19/21 0453   INR 2.9* 3.2* 3.9*   PTP 28.5* 32.0* 38.2*      No results for input(s): FE, TIBC, PSAT, FERR in the last 72 hours. Lab Results   Component Value Date/Time    Folate 10.0 10/28/2020 03:56 AM      No results for input(s): PH, PCO2, PO2 in the last 72 hours. No results for input(s): CPK, CKNDX, TROIQ in the last 72 hours.     No lab exists for component: CPKMB  Lab Results   Component Value Date/Time    Cholesterol, total 129 04/16/2021 10:40 AM    HDL Cholesterol 33 04/16/2021 10:40 AM    LDL, calculated 74 04/16/2021 10:40 AM    Triglyceride 110 04/16/2021 10:40 AM    CHOL/HDL Ratio 3.9 04/16/2021 10:40 AM     Lab Results   Component Value Date/Time    Glucose (POC) 145 (H) 05/21/2021 11:04 AM    Glucose (POC) 136 (H) 05/21/2021 06:09 AM    Glucose (POC) 175 (H) 05/20/2021 09:22 PM    Glucose (POC) 123 (H) 05/20/2021 04:53 PM    Glucose (POC) 162 (H) 05/20/2021 11:32 AM     Lab Results   Component Value Date/Time    Color DARK YELLOW 05/11/2021 03:57 AM    Appearance TURBID (A) 05/11/2021 03:57 AM    Specific gravity 1.029 05/11/2021 03:57 AM    Specific gravity 1.013 11/09/2020 12:05 AM    pH (UA) 5.0 05/11/2021 03:57 AM    Protein 100 (A) 05/11/2021 03:57 AM    Glucose Negative 05/11/2021 03:57 AM    Ketone TRACE (A) 05/11/2021 03:57 AM    Bilirubin Negative 04/18/2021 05:08 PM    Urobilinogen 0.2 05/11/2021 03:57 AM    Nitrites Positive (A) 05/11/2021 03:57 AM    Leukocyte Esterase LARGE (A) 05/11/2021 03:57 AM    Epithelial cells MODERATE (A) 05/11/2021 03:57 AM    Bacteria 1+ (A) 05/11/2021 03:57 AM    WBC >100 (H) 05/11/2021 03:57 AM    RBC 20-50 05/11/2021 03:57 AM     Medications Reviewed:     Current Facility-Administered Medications   Medication Dose Route Frequency    hydrOXYzine HCL (ATARAX) tablet 25 mg  25 mg Oral TID PRN    albuterol-ipratropium (DUO-NEB) 2.5 MG-0.5 MG/3 ML  3 mL Nebulization Q6H RT    simethicone (MYLICON) tablet 80 mg  80 mg Oral QID PRN    epoetin amalia-epbx (RETACRIT) injection 20,000 Units  20,000 Units SubCUTAneous Q TUE, THU & SAT    artificial saliva (MOUTH KOTE) 1 Spray  1 Spray Oral PRN    cephALEXin (KEFLEX) capsule 250 mg  250 mg Oral BID    PARoxetine (PAXIL) tablet 20 mg  20 mg Oral DAILY    hydrocortisone (CORTAID) 1 % cream   Topical PRN    senna-docusate (PERICOLACE) 8.6-50 mg per tablet 1 Tab  1 Tablet Oral DAILY    glucose chewable tablet 16 g  4 Tablet Oral PRN    dextrose (D50W) injection syrg 12.5-25 g  25-50 mL IntraVENous PRN    glucagon (GLUCAGEN) injection 1 mg  1 mg IntraMUSCular PRN    insulin lispro (HUMALOG) injection   SubCUTAneous AC&HS    famotidine (PEPCID) tablet 20 mg  20 mg Oral DAILY    benzocaine-zinc cl-benzalkonium cl (ORAJEL) 20-0.1-0.02 % mucosal gel   Oral PRN    magic mouthwash cpd (without sucralfate)  5 mL Oral DAILY PRN    anidulafungin (ERAXIS) 100 mg in 0.9% sodium chloride 130 mL IVPB  100 mg IntraVENous Q24H    balsam peru-castor oiL (VENELEX) ointment   Topical BID    albumin human 25% (BUMINATE) solution 50 g  50 g IntraVENous DIALYSIS PRN    heparin (porcine) 1,000 unit/mL injection 1,900 Units  1,900 Units InterCATHeter DIALYSIS PRN    And    heparin (porcine) 1,000 unit/mL injection 1,900 Units  1,900 Units InterCATHeter DIALYSIS PRN    hydrALAZINE (APRESOLINE) 20 mg/mL injection 20 mg  20 mg IntraVENous Q6H PRN    hydrALAZINE (APRESOLINE) 20 mg/mL injection 10 mg  10 mg IntraVENous Q6H PRN    ranolazine ER (RANEXA) tablet 1,000 mg  1,000 mg Oral BID    hydrALAZINE (APRESOLINE) tablet 100 mg  100 mg Oral TID    isosorbide mononitrate ER (IMDUR) tablet 30 mg  30 mg Oral DAILY    tafamidis cap 61 mg (Patient Supplied)  61 mg Oral DAILY    ondansetron (ZOFRAN) injection 4 mg  4 mg IntraVENous Q4H PRN    zinc oxide-cod liver oil (DESITIN) 40 % paste   Topical Q12H    sodium chloride (NS) flush 5-10 mL  5-10 mL IntraVENous PRN    sodium chloride (NS) flush 5-40 mL  5-40 mL IntraVENous Q8H    sodium chloride (NS) flush 5-40 mL  5-40 mL IntraVENous PRN    acetaminophen (TYLENOL) tablet 650 mg  650 mg Oral Q6H PRN    Or    acetaminophen (TYLENOL) suppository 650 mg  650 mg Rectal Q6H PRN    Warfarin - Pharmacy to Dose   Other Rx Dosing/Monitoring    sodium chloride (NS) flush 5-40 mL  5-40 mL IntraVENous Q8H    sodium chloride (NS) flush 5-40 mL  5-40 mL IntraVENous PRN    nystatin (MYCOSTATIN) 100,000 unit/gram powder   Topical PRN   ______________________________________________________________________  EXPECTED LENGTH OF STAY: 4d 19h  ACTUAL LENGTH OF STAY:          10               Frandy Stroud MD

## 2021-05-21 NOTE — PROGRESS NOTES
[facility]   [address] [address], [facility]  Phone: [phone]   [phone]       Nephrology Progress Note  Ulisses Johnson     1950     [id number]  Date of Admission : 5/11/2021 05/21/21    CC: Follow up for ROCIO    Assessment and Plan   ROCIO on CKD:  - 2/2 progressive CKD and CRS  - dialysis initiated 4/26/21. Has RI permacat for access. - HD TTS. Next HD tomorrow   - if she is switching to MWF schedule as out pt, we will dialyze her again on Monday   - out pt dialysis to be set up at [facility]       Acute on chronic Hypoxic Resp failure :  - baseline severe COPD + chronic CHF + morbid obesity/ JED   - Pulm HTN   - extubated 5/13     Chronic systolic CHF, stage C NYHA class IV  Combined ischemic and nonischemic cardiomyopathy  S/p HM 2 LVAD implant 4/15/2017 by Dr. Alize Zhang at [facility]  hx of recurrent VT : off mexiletine  chronic RV failure. 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 - per AHF team      Chronic anemia :   ATTR amyloidosis  - anemia of chronic disease   - continue HEIKE  MWF     Chronic A. Fib. History of endometrial Ca       Interval History:  Seen and examined. Doing well. No N/V/CP/SOB. Awaiting discharge plans. Review of Systems: Pertinent items are noted in HPI.     Current Medications:   Current Facility-Administered Medications   Medication Dose Route Frequency    hydrOXYzine HCL (ATARAX) tablet 25 mg  25 mg Oral TID PRN    epoetin amalia-epbx (RETACRIT) injection 20,000 Units  20,000 Units SubCUTAneous Q TUE, THU & SAT    artificial saliva (MOUTH KOTE) 1 Spray  1 Spray Oral PRN    cephALEXin (KEFLEX) capsule 250 mg  250 mg Oral BID    PARoxetine (PAXIL) tablet 20 mg  20 mg Oral DAILY    hydrocortisone (CORTAID) 1 % cream   Topical PRN    senna-docusate (PERICOLACE) 8.6-50 mg per tablet 1 Tab  1 Tablet Oral DAILY    glucose chewable tablet 16 g  4 Tablet Oral PRN    dextrose (D50W) injection syrg 12.5-25 g  25-50 mL IntraVENous PRN    glucagon (GLUCAGEN) injection 1 mg  1 mg IntraMUSCular PRN    insulin lispro (HUMALOG) injection   SubCUTAneous AC&HS    famotidine (PEPCID) tablet 20 mg  20 mg Oral DAILY    benzocaine-zinc cl-benzalkonium cl (ORAJEL) 20-0.1-0.02 % mucosal gel   Oral PRN    magic mouthwash cpd (without sucralfate)  5 mL Oral DAILY PRN    anidulafungin (ERAXIS) 100 mg in 0.9% sodium chloride 130 mL IVPB  100 mg IntraVENous Q24H    balsam peru-castor oiL (VENELEX) ointment   Topical BID    albumin human 25% (BUMINATE) solution 50 g  50 g IntraVENous DIALYSIS PRN    heparin (porcine) 1,000 unit/mL injection 1,900 Units  1,900 Units InterCATHeter DIALYSIS PRN    And    heparin (porcine) 1,000 unit/mL injection 1,900 Units  1,900 Units InterCATHeter DIALYSIS PRN    hydrALAZINE (APRESOLINE) 20 mg/mL injection 20 mg  20 mg IntraVENous Q6H PRN    hydrALAZINE (APRESOLINE) 20 mg/mL injection 10 mg  10 mg IntraVENous Q6H PRN    ranolazine ER (RANEXA) tablet 1,000 mg  1,000 mg Oral BID    hydrALAZINE (APRESOLINE) tablet 100 mg  100 mg Oral TID    isosorbide mononitrate ER (IMDUR) tablet 30 mg  30 mg Oral DAILY    tafamidis cap 61 mg (Patient Supplied)  61 mg Oral DAILY    ondansetron (ZOFRAN) injection 4 mg  4 mg IntraVENous Q4H PRN    zinc oxide-cod liver oil (DESITIN) 40 % paste   Topical Q12H    sodium chloride (NS) flush 5-10 mL  5-10 mL IntraVENous PRN    sodium chloride (NS) flush 5-40 mL  5-40 mL IntraVENous Q8H    sodium chloride (NS) flush 5-40 mL  5-40 mL IntraVENous PRN    acetaminophen (TYLENOL) tablet 650 mg  650 mg Oral Q6H PRN    Or    acetaminophen (TYLENOL) suppository 650 mg  650 mg Rectal Q6H PRN    albuterol-ipratropium (DUO-NEB) 2.5 MG-0.5 MG/3 ML  3 mL Nebulization Q6H PRN    Warfarin - Pharmacy to Dose   Other Rx Dosing/Monitoring    sodium chloride (NS) flush 5-40 mL  5-40 mL IntraVENous Q8H    sodium chloride (NS) flush 5-40 mL  5-40 mL IntraVENous PRN  nystatin (MYCOSTATIN) 100,000 unit/gram powder   Topical PRN      Allergies   Allergen Reactions    Benzodiazepines Other (comments)     Respiratory issues       Objective:  Vitals:    Vitals:    05/21/21 0228 05/21/21 0502 05/21/21 0814 05/21/21 0822   Pulse:   (!) 101 85   Resp:   16    Temp:   98.4 °F (36.9 °C)    TempSrc:       SpO2: 96%  96%    Weight:  106 kg (233 lb 11 oz)     Height:         Intake and Output:  No intake/output data recorded. 05/19 1901 - 05/21 0700  In: 700 [P.O.:700]  Out: 2000     Physical Examination:    General: Obese   HEENT            NAD, pleasant  Neck:  permacath +  Resp:  Lungs clear B/L, no wheezing   CV:  RRR,  S1,S2  GI:  Soft, NT, + BS  Neurologic:  AAO X 3   Psych:             Normal affect and mood       []    High complexity decision making was performed  []    Patient is at high-risk of decompensation with multiple organ involvement    Lab Data Personally Reviewed: I have reviewed all the pertinent labs, microbiology data and radiology studies during assessment.     Recent Labs     05/21/21 0029 05/20/21 0445 05/20/21 0443 05/19/21 0454 05/19/21 0453     --  134* 139  --    K 3.7  --  4.0 3.9  --      --  100 104  --    CO2 27  --  24 27  --    *  --  124* 116*  --    BUN 20  --  35* 27*  --    CREA 3.45*  --  4.77* 3.86*  --    CA 8.8  --  9.0 8.8  --    MG 2.1 2.4  --   --  2.4   PHOS 3.6 4.2  --   --  3.6   ALB 2.9*  --  3.0* 3.0*  --    ALT 8*  --  9* 8*  --    INR 2.9* 3.2*  --   --  3.9*     Recent Labs     05/21/21  0029 05/20/21 0443 05/19/21 0454   WBC 4.1 4.8 3.5*   HGB 8.6* 9.0* 9.1*   HCT 29.0* 30.4* 31.2*    154 143*     No results found for: SDES  Lab Results   Component Value Date/Time    Culture result: CANDIDA ALBICANS (A) 05/13/2021 11:29 AM    Culture result: RARE DIPHTHEROIDS (A) 05/12/2021 04:29 AM    Culture result: LIGHT YEAST, (APPARENT CANDIDA ALBICANS) (A) 05/11/2021 07:09 PM    Culture result: LIGHT NORMAL RESPIRATORY BETO 05/11/2021 07:09 PM    Culture result: NO GROWTH 5 DAYS 05/11/2021 04:30 PM     Recent Results (from the past 24 hour(s))   GLUCOSE, POC    Collection Time: 05/20/21 11:32 AM   Result Value Ref Range    Glucose (POC) 162 (H) 65 - 117 mg/dL    Performed by He HOFFMAN (CON)    GLUCOSE, POC    Collection Time: 05/20/21  4:53 PM   Result Value Ref Range    Glucose (POC) 123 (H) 65 - 117 mg/dL    Performed by Manny Solis    GLUCOSE, POC    Collection Time: 05/20/21  9:22 PM   Result Value Ref Range    Glucose (POC) 175 (H) 65 - 117 mg/dL    Performed by Dylan HAJI    MAGNESIUM    Collection Time: 05/21/21 12:29 AM   Result Value Ref Range    Magnesium 2.1 1.6 - 2.4 mg/dL   PHOSPHORUS    Collection Time: 05/21/21 12:29 AM   Result Value Ref Range    Phosphorus 3.6 2.6 - 4.7 MG/DL   PROTHROMBIN TIME + INR    Collection Time: 05/21/21 12:29 AM   Result Value Ref Range    INR 2.9 (H) 0.9 - 1.1      Prothrombin time 28.5 (H) 9.0 - 11.1 sec   PROCALCITONIN    Collection Time: 05/21/21 12:29 AM   Result Value Ref Range    Procalcitonin 0.91 ng/mL   LD    Collection Time: 05/21/21 12:29 AM   Result Value Ref Range     81 - 246 U/L   CBC WITH AUTOMATED DIFF    Collection Time: 05/21/21 12:29 AM   Result Value Ref Range    WBC 4.1 3.6 - 11.0 K/uL    RBC 3.18 (L) 3.80 - 5.20 M/uL    HGB 8.6 (L) 11.5 - 16.0 g/dL    HCT 29.0 (L) 35.0 - 47.0 %    MCV 91.2 80.0 - 99.0 FL    MCH 27.0 26.0 - 34.0 PG    MCHC 29.7 (L) 30.0 - 36.5 g/dL    RDW 19.7 (H) 11.5 - 14.5 %    PLATELET 506 347 - 965 K/uL    MPV 9.2 8.9 - 12.9 FL    NRBC 0.0 0  WBC    ABSOLUTE NRBC 0.00 0.00 - 0.01 K/uL    NEUTROPHILS 78 (H) 32 - 75 %    LYMPHOCYTES 11 (L) 12 - 49 %    MONOCYTES 9 5 - 13 %    EOSINOPHILS 0 0 - 7 %    BASOPHILS 1 0 - 1 %    IMMATURE GRANULOCYTES 1 (H) 0.0 - 0.5 %    ABS. NEUTROPHILS 3.2 1.8 - 8.0 K/UL    ABS. LYMPHOCYTES 0.5 (L) 0.8 - 3.5 K/UL    ABS. MONOCYTES 0.4 0.0 - 1.0 K/UL    ABS.  EOSINOPHILS 0.0 0.0 - 0.4 K/UL    ABS. BASOPHILS 0.0 0.0 - 0.1 K/UL    ABS. IMM. GRANS. 0.0 0.00 - 0.04 K/UL    DF SMEAR SCANNED      RBC COMMENTS ANISOCYTOSIS  1+        RBC COMMENTS OVALOCYTES  PRESENT       METABOLIC PANEL, COMPREHENSIVE    Collection Time: 05/21/21 12:29 AM   Result Value Ref Range    Sodium 136 136 - 145 mmol/L    Potassium 3.7 3.5 - 5.1 mmol/L    Chloride 100 97 - 108 mmol/L    CO2 27 21 - 32 mmol/L    Anion gap 9 5 - 15 mmol/L    Glucose 142 (H) 65 - 100 mg/dL    BUN 20 6 - 20 MG/DL    Creatinine 3.45 (H) 0.55 - 1.02 MG/DL    BUN/Creatinine ratio 6 (L) 12 - 20      GFR est AA 16 (L) >60 ml/min/1.73m2    GFR est non-AA 13 (L) >60 ml/min/1.73m2    Calcium 8.8 8.5 - 10.1 MG/DL    Bilirubin, total 0.6 0.2 - 1.0 MG/DL    ALT (SGPT) 8 (L) 12 - 78 U/L    AST (SGOT) 16 15 - 37 U/L    Alk. phosphatase 58 45 - 117 U/L    Protein, total 7.3 6.4 - 8.2 g/dL    Albumin 2.9 (L) 3.5 - 5.0 g/dL    Globulin 4.4 (H) 2.0 - 4.0 g/dL    A-G Ratio 0.7 (L) 1.1 - 2.2     GLUCOSE, POC    Collection Time: 05/21/21  6:09 AM   Result Value Ref Range    Glucose (POC) 136 (H) 65 - 117 mg/dL    Performed by Dean Restrepo            Total time spent with patient:  xxx   min. Care Plan discussed with:  Patient     Family      RN      Consulting Physician [address],         I have reviewed the flowsheets. Chart and Pertinent Notes have been reviewed. No change in PMH ,family and social history from Consult note.       Kirit Magana MD

## 2021-05-22 NOTE — PROGRESS NOTES
0730: Bedside shift change report received by Olivia Mckeon (offgoing nurse). Report included the following information SBAR, Kardex, Procedure Summary, Intake/Output, MAR, Recent Results, and Cardiac Rhythm paced . 1930: Bedside shift change report given to Mor Jose Carlos (oncoming nurse). Report included the following information SBAR, Kardex, Procedure Summary, Intake/Output, MAR, Recent Results and Cardiac Rhythm Paced.     -------------------  Care Plan [id number]  Problem: Pressure Injury - Risk of  Goal: *Prevention of pressure injury  Description: Document Jaime Scale and appropriate interventions in the flowsheet. Outcome: Progressing Towards Goal  Note: Pressure Injury Interventions:  Sensory Interventions: Assess changes in LOC, Keep linens dry and wrinkle-free    Moisture Interventions: Internal/External urinary devices, Maintain skin hydration (lotion/cream), Minimize layers, Absorbent underpads, Apply protective barrier, creams and emollients    Activity Interventions: Increase time out of bed, Pressure redistribution bed/mattress(bed type), PT/OT evaluation    Mobility Interventions: Float heels, HOB 30 degrees or less, Pressure redistribution bed/mattress (bed type), PT/OT evaluation, Turn and reposition approx. every two hours(pillow and wedges)    Nutrition Interventions: Document food/fluid/supplement intake    Friction and Shear Interventions: HOB 30 degrees or less, Lift team/patient mobility team                Problem: Falls - Risk of  Goal: *Absence of Falls  Description: Document Godfrey Fall Risk and appropriate interventions in the flowsheet.   Outcome: Progressing Towards Goal  Note: Fall Risk Interventions:  Mobility Interventions: Communicate number of staff needed for ambulation/transfer, OT consult for ADLs, Patient to call before getting OOB, PT Consult for mobility concerns, Bed/chair exit alarm    Mentation Interventions: Adequate sleep, hydration, pain control, Bed/chair exit alarm, Door open when patient unattended, Increase mobility, More frequent rounding, Reorient patient, Room close to nurse's station, Update white board    Medication Interventions: Bed/chair exit alarm, Evaluate medications/consider consulting pharmacy, Patient to call before getting OOB, Teach patient to arise slowly    Elimination Interventions: Bed/chair exit alarm, Call light in reach, Patient to call for help with toileting needs, Stay With Me (per policy), Toileting schedule/hourly rounds    History of Falls Interventions: Bed/chair exit alarm, Room close to nurse's station, Evaluate medications/consider consulting pharmacy

## 2021-05-22 NOTE — PROGRESS NOTES
Hospitalist Progress Note  Yasir Fitzpatrick MD  Answering service: [phone] OR [phone] from in house phone      Date of Service:  2021  NAME:  Patrick Gallagher  :  1950  MRN:  [id number]    Admission Summary: This is a 22-year-old female who has advanced at heart failure with ventricular assist device support is admitted for dyspnea. She is found to have sepsis on further work-up. Interval history / Subjective: Follow-up for dyspnea/CHF/sepsis   She states abdominal distention and gas pain better with simethicone. She still has intermittent loose stool. Assessment & Plan:     #. Sepsis likely source chronic surgical wound infection.   -Now back on home Keflex now. #Candida UTI: Continue Eraxis. ID help appreciated. #. Acute on chronic respiratory failure :due to sepsis, chf and severe copd- resolved. - extubated. doing well on her home trilogy ventilator at night. #. Chronic RV failure  #. CAD: home regimen  #. Chronic sCHF: BiVentricular , s/p LVAD, s/p ICD. 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 H/o breast cancer () - s/p bilateral mastectomy/chemo   #. Lymphedema of LLE due to cancer treatment  #. COPD: no acute exacerbation. FEV1 50%  #. AFib: AC as above   #. ROCIO on CKD stage 4: progressive CKD and CRS- started HD   - Nephro following: R- IJ permacath in situ- OP HD set up at [place], [place]     #. Hyperkalemia, resolved  #. Pulmonary hypertension  #. Morbid obesity, BMI 42.29 kg/m². #. DM2: A1c 7.2, SSI, AccuChecks, monitor  #. JED on Trilogy  #. Urinary incontinence, severe  #.  Endometrial cancer (2002)- s/p Hysterectomy  #. HTN: chronic, stable, Home regimen, PRN BP meds. Monitor  #. HLD: chronic, Stable, Home regimen  #Abdominal distention with gas pain: As needed simethicone. KUB with: Gaseous distention. -DC Colace  -Add probiotics and as needed Imodium. Discharge planning:  -Patient accepted to [facility] dialysis unit but available time only at 645 Monday Wednesday Friday, currently on a TTS schedule, patient and family session be difficult for them to arrange transportation. CM working on to set up for [facility] 2nd/3rd shift on TTS    Code status: Full  DVT prophylaxis: warfarin  Care Plan discussed with: Patient/Family and Nurse  Disposition: Home with home health PT OT,in 2-3 days . She will finish Eraxis on 5/23     Hospital Problems  Date Reviewed: 4/25/2021        Codes Class Noted POA    Severe sepsis with acute organ dysfunction [facility]) ICD-10-CM: A41.9, R65.20  ICD-9-CM: 038.9, 995.92 Acute 5/11/2021 Yes        * (Principal) Acute encephalopathy ICD-10-CM: G93.40  ICD-9-CM: 348.30  5/11/2021 Unknown            Review of Systems:   Pertinent items are mentioned in interval history. Vital Signs:    Last 24hrs VS reviewed since prior progress note. Most recent are:  Visit Vitals  /80   Pulse 75   Temp 98.9 °F (37.2 °C) (Oral)   Resp 16   Ht 5' 7\" (1.702 m)   Wt 106 kg (233 lb 11 oz)   SpO2 91%   BMI 36.60 kg/m²         Intake/Output Summary (Last 24 hours) at 5/22/2021 1421  Last data filed at 5/22/2021 0358  Gross per 24 hour   Intake 440 ml   Output 200 ml   Net 240 ml        Physical Examination:   Evaluated face to face and examined 05/22/21    General:   Sitting in a wheelchair by the bedside. Alert oriented x4. Not in distress. Card:  humming VAD sound. good peripheral perfusion  Resp:  No accessory muscle use, fair AE, no wheezes. no crepitations  Abd:   Distended, normoactive, nontender. Extremities:  No cyanosis or clubbing, edema present. . Venous insufficiency discoloration. Neuro:  Grossly normal, no focal neuro deficits, follows commands, speech wnl. Psych:  Good insight, not agitated. Data Review:    Review and/or order of clinical lab test  Review and/or order of tests in the radiology section of [facility]  Review and/or order of tests in the medicine section of [facility]  Labs:     Recent Labs     05/22/21 0409 05/21/21  0029   WBC 4.2 4.1   HGB 8.1* 8.6*   HCT 27.7* 29.0*   * 152     Recent Labs     05/22/21  0409 05/21/21  0029 05/20/21  0445 05/20/21  0443   * 136  --  134*   K 3.9 3.7  --  4.0    100  --  100   CO2 25 27  --  24   BUN 28* 20  --  35*   CREA 4.97* 3.45*  --  4.77*   * 142*  --  124*   CA 9.0 8.8  --  9.0   MG 2.5* 2.1 2.4  --    PHOS 4.7 3.6 4.2  --      Recent Labs     05/22/21 0409 05/21/21  0029 05/20/21  0443   ALT 8* 8* 9*   AP 53 58 52   TBILI 0.6 0.6 0.6   TP 7.1 7.3 7.5   ALB 2.9* 2.9* 3.0*   GLOB 4.2* 4.4* 4.5*     Recent Labs     05/22/21 0409 05/21/21  0029 05/20/21 0445   INR 1.5* 2.9* 3.2*   PTP 15.6* 28.5* 32.0*      Recent Labs     05/22/21  0409   FERR 212      Lab Results   Component Value Date/Time    Folate 10.0 10/28/2020 03:56 AM      No results for input(s): PH, PCO2, PO2 in the last 72 hours. No results for input(s): CPK, CKNDX, TROIQ in the last 72 hours.     No lab exists for component: CPKMB  Lab Results   Component Value Date/Time    Cholesterol, total 129 04/16/2021 10:40 AM    HDL Cholesterol 33 04/16/2021 10:40 AM    LDL, calculated 74 04/16/2021 10:40 AM    Triglyceride 110 04/16/2021 10:40 AM    CHOL/HDL Ratio 3.9 04/16/2021 10:40 AM     Lab Results   Component Value Date/Time    Glucose (POC) 105 05/22/2021 11:47 AM    Glucose (POC) 123 (H) 05/22/2021 06:18 AM    Glucose (POC) 123 (H) 05/21/2021 09:10 PM    Glucose (POC) 125 (H) 05/21/2021 05:39 PM    Glucose (POC) 145 (H) 05/21/2021 11:04 AM     Lab Results   Component Value Date/Time    Color DARK YELLOW 05/22/2021 05:30 AM    Appearance CLOUDY (A) 05/22/2021 05:30 AM    Specific gravity 1.023 05/22/2021 05:30 AM    Specific gravity 1.013 11/09/2020 12:05 AM    pH (UA) 5.0 05/22/2021 05:30 AM    Protein 100 (A) 05/22/2021 05:30 AM    Glucose Negative 05/22/2021 05:30 AM    Ketone Negative 05/22/2021 05:30 AM    Bilirubin Negative 04/18/2021 05:08 PM    Urobilinogen 0.2 05/22/2021 05:30 AM    Nitrites Positive (A) 05/22/2021 05:30 AM    Leukocyte Esterase SMALL (A) 05/22/2021 05:30 AM    Epithelial cells FEW 05/22/2021 05:30 AM    Bacteria 1+ (A) 05/22/2021 05:30 AM    WBC 0-4 05/22/2021 05:30 AM    RBC 0-5 05/22/2021 05:30 AM     Medications Reviewed:     Current Facility-Administered Medications   Medication Dose Route Frequency    warfarin (COUMADIN) tablet 2.5 mg  2.5 mg Oral ONCE    L.acidophilus-paracasei-S.thermophil-bifidobacter (RISAQUAD) 8 billion cell capsule  1 Capsule Oral DAILY    loperamide (IMODIUM) 1 mg/7.5 mL oral solution 2 mg  2 mg Oral QID PRN    hydrOXYzine HCL (ATARAX) tablet 25 mg  25 mg Oral TID PRN    albuterol-ipratropium (DUO-NEB) 2.5 MG-0.5 MG/3 ML  3 mL Nebulization Q6H RT    simethicone (MYLICON) tablet 80 mg  80 mg Oral QID PRN    epoetin amalia-epbx (RETACRIT) injection 20,000 Units  20,000 Units SubCUTAneous Q TUE, THU & SAT    artificial saliva (MOUTH KOTE) 1 Spray  1 Spray Oral PRN    cephALEXin (KEFLEX) capsule 250 mg  250 mg Oral BID    PARoxetine (PAXIL) tablet 20 mg  20 mg Oral DAILY    hydrocortisone (CORTAID) 1 % cream   Topical PRN    glucose chewable tablet 16 g  4 Tablet Oral PRN    dextrose (D50W) injection syrg 12.5-25 g  25-50 mL IntraVENous PRN    glucagon (GLUCAGEN) injection 1 mg  1 mg IntraMUSCular PRN    insulin lispro (HUMALOG) injection   SubCUTAneous AC&HS    famotidine (PEPCID) tablet 20 mg  20 mg Oral DAILY    benzocaine-zinc cl-benzalkonium cl (ORAJEL) 20-0.1-0.02 % mucosal gel   Oral PRN    magic mouthwash cpd (without sucralfate)  5 mL Oral DAILY PRN    anidulafungin (ERAXIS) 100 mg in 0.9% sodium chloride 130 mL IVPB  100 mg IntraVENous Q24H    balsam peru-castor oiL (VENELEX) ointment   Topical BID    albumin human 25% (BUMINATE) solution 50 g  50 g IntraVENous DIALYSIS PRN    heparin (porcine) 1,000 unit/mL injection 1,900 Units  1,900 Units InterCATHeter DIALYSIS PRN    And    heparin (porcine) 1,000 unit/mL injection 1,900 Units  1,900 Units InterCATHeter DIALYSIS PRN    hydrALAZINE (APRESOLINE) 20 mg/mL injection 20 mg  20 mg IntraVENous Q6H PRN    hydrALAZINE (APRESOLINE) 20 mg/mL injection 10 mg  10 mg IntraVENous Q6H PRN    ranolazine ER (RANEXA) tablet 1,000 mg  1,000 mg Oral BID    hydrALAZINE (APRESOLINE) tablet 100 mg  100 mg Oral TID    isosorbide mononitrate ER (IMDUR) tablet 30 mg  30 mg Oral DAILY    tafamidis cap 61 mg (Patient Supplied)  61 mg Oral DAILY    ondansetron (ZOFRAN) injection 4 mg  4 mg IntraVENous Q4H PRN    zinc oxide-cod liver oil (DESITIN) 40 % paste   Topical Q12H    sodium chloride (NS) flush 5-10 mL  5-10 mL IntraVENous PRN    sodium chloride (NS) flush 5-40 mL  5-40 mL IntraVENous Q8H    sodium chloride (NS) flush 5-40 mL  5-40 mL IntraVENous PRN    acetaminophen (TYLENOL) tablet 650 mg  650 mg Oral Q6H PRN    Or    acetaminophen (TYLENOL) suppository 650 mg  650 mg Rectal Q6H PRN    Warfarin - Pharmacy to Dose   Other Rx Dosing/Monitoring    sodium chloride (NS) flush 5-40 mL  5-40 mL IntraVENous Q8H    sodium chloride (NS) flush 5-40 mL  5-40 mL IntraVENous PRN    nystatin (MYCOSTATIN) 100,000 unit/gram powder   Topical PRN   ______________________________________________________________________  EXPECTED LENGTH OF STAY: 4d 19h  ACTUAL LENGTH OF STAY:          11               Raina Luna MD

## 2021-05-22 NOTE — PROGRESS NOTES
[facility] in [facility] HEALTHCARE  Inpatient Consult Progress Note      Patient name: Patrica Mcneil  Patient : 1950  Patient MRN: [id number]  Consulting MD: Dave Carmona MD  Date of service: 21    REASON FOR CONSULT:  Acute on chronic systolic heart failure    PLAN:  Okay to discharge home from AHF perspective after antibiotics completed   Continue set speed 9400rpm, low speed 9000rpm   Cont home dose hydralazine/Imdur  Cont Tafamidis 61mg daily  Intolerant to BB/ACEi/ARB/ARNI due to aTTR  Intolerant to MRA due to hyperkalemia  Volume management per Nephrology; strict I/O, standing weights  Pulmonary hygiene including IS and nebulizers   Start Chest PT  Cont Eraxis until . ID recommendations appreciated for C. Albicans in urine and sputum   Blood cultures negative   Drive line culture- rare diphtheroids   Repeat Urine culture via straight cath  Coumadin dose per pharmacy; goal INR lowered to 1.8-2.5 due to GIB  Resume home keflex dose- d/w with ID  Appreciate EP recommendations- no changes to current therapy.    TSH 1.60- monitor for now   Bowel regimen  Advance diet   PT/OT   Standing weights only  Cont to avoid O2 on patient due to intolerance   Up to date on flu, PNA, and COVID vaccinations   Will need to start DC plan for home with PT/OT /monday      IMPRESSION:  Likely urosepsis  Acute respiratory failure   Chronic RV failure  Coronary artery disease  · [facility] (2016) high grade ramus and small PDA disease, borderline disease of LAD and takeoff of pRCA  Chronic systolic heart failure  · Stage C, NYHA class IV improved to IIIA symptoms with LVAD  · Combined ischemic and non-ischemic cardiomyopathy, 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 Claire Hollis)  · C/b delayed extubation due to severe COPD  · C/b critical illness polyneuropathy  · C/b prolonged hospitalization post-LVAD, 55 days  · C/b sternal wound infection s/p debridement (by Dr. Alize Zhang) s/p wound vac  · C/b sacral ulcer  · Would culture positive for Staph aureus, not MRSA  · C/b LVAD site drainage, improved  S/p CRT-ICD  · ICD fired due to electrolyte imbalance (2011)  H/o breast cancer (1992)   · s/p bilateral mastectomy/chemo and endometrial cancer s/p hysterectomy  · Lymphedema of LLE due to cancer treatment  Severe COPD with FEV1 50%  Depression  Atrial fibrillation  H/o \"two mini strokes\"  S/p fall with hip facture   · Right hip hemmiarthroplasty (5/23/18) by Dr. Diego Oliver)  · S/p removed hip hardwarare due to pain (4/15/19)  COPD severe  CKD, stage 4- on HD  Hyperkalemia, resolved  Pulmonary hypertension  Cardiac risk factors:  · Morbid obesity, Body mass index is 42.29 kg/m². · DM2 insulin dependent  · JED on Trilogy  · HL  Urinary incontinence, severe  · no procedures due to anticoagulation  Endometrial cancer (2002)  HTN  HL     Recent Events:  Slept well  No acute overnight events  PCT normalized   Some lose stools, may need priobiotics  ONe record of hypertension     CARDIAC IMAGING:  TTE 5/11/21 LVEF < 15%, LVIDd 5.96cm, TAPSE 0.99cm, RVIDd 4.14cm  TTE 4/19 shows improved LVIDd, 6.68 cm with RVIDd 5.14 cm and TAPSE 1.1 cm   TTE 4/28/21 LVIDd 7.37cm, RVIDd 5.27cm, TAPSE 1.44cm   Echo (9/24/19) LVEF 20-25%, AV opens 1:1, no AR  Echo (5/29/18) LVEF 10-%, ramps study done, report in [facility]  Echo (1/9/18) ramp study done, LVEDD 7.1cm  LHC (11/9/18) 2 vessel disease with 90% OM, 80% PDA, DSA to PDA branch  RHC 4/20 showed adequate Sal CI 3.0 but severely elevated RA pressure 24 mmHg  CXR negative 4/27/21     INTERVAL HISTORY:  Ms. Elisa Moore is a 78 y.o. Female s/p LVAD implant with HM 2 and ESRD requiring HD that was recently admitted for RV failure and worsening renal function.  She was discharged on 5/5/21 to [facility] for rehabilitation before transitioning home to live her with daughter. In the last few days she had worsening mental status changed, decreased UOP and respiratory distress after possible aspiration- she was intubated and admitted to [facility] for further management. LVAD INTERROGATION:  Device interrogated in person  Device function normal, normal flow, no events  LVAD   Pump Speed (RPM): 9400  Pump Flow (LPM): 5.2  MAP: 94  PI (Pulsitility Index): 6  Power: 5.7   Test: No  Back Up  at Bedside & Labeled: Yes  Power Module Test: No  Driveline Site Care: No  Driveline Dressing: Clean, Dry, and Intact  Outpatient: No  MAP in Therapeutic Range (Outpatient): No  Testing  Alarms Reviewed: Yes  Back up SC speed: 9400  Back up Low Speed Limit: 9000  Emergency Equipment with Patient?: Yes  Emergency procedures reviewed?: No  Drive line site inspected?: No (covered by dressing)  Drive line intergrity inspected?: Yes  Drive line dressing changed?: No    PHYSICAL EXAM:  Visit Vitals  /80 Comment: treatment ended. Blood returned   Pulse 76   Temp 98.4 °F (36.9 °C)   Resp 18   Ht 5' 7\" (1.702 m)   Wt 233 lb 11 oz (106 kg)   SpO2 96%   BMI 36.60 kg/m²     General: Patient is well developed, well-nourished in no acute distress  HEENT: Normocephalic and atraumatic. No scleral icterus. Pupils are equal, round and reactive to light and accomodation. No conjunctival injection. Oropharynx is clear. Neck: Supple. No evidence of thyroid enlargements or lymphadenopathy. JVD: Cannot be appreciated   Lungs: Breath sounds are equal and clear bilaterally. No wheezes, rhonchi, or rales. Heart: Regular rate and rhythm with normal S1 and S2. No murmurs, gallops or rubs. Abdomen: Soft, no mass or tenderness. No organomegaly or hernia. Bowel sounds present. Genitourinary and rectal: deferred  Extremities: No cyanosis, clubbing, or edema. Neurologic: No focal sensory or motor deficits are noted. Grossly intact.   Psychiatric: Awake, alert an doriented x 3. Appropriate mood and affect. Skin: Warm, dry and well perfused. No lesions, nodules or rashes are noted. REVIEW OF SYSTEMS:  General: Denies fever, night sweats. Ear, nose and throat: Denies difficulty hearing, sinus problems, runny nose, post-nasal drip, ringing in ears, mouth sores, loose teeth, ear pain, nosebleeds, sore throate, facial pain or numbess  Cardiovascular: see above in the interval history  Respiratory: Denies cough, wheezing, sputum production, hemoptysis. Gastrointestinal: Denies heartburn, constipation, intolerance to certain foods, diarrhea, abdominal pain, nausea, vomiting, difficulty swallowing, blood in stool  Kidney and bladder: Denies painful urination, frequent urination, urgency, prostate problems and impotence  Musculoskeletal: Denies joint pain, muscle weakness  Skin and hair: Denies change in existing skin lesions, hair loss or increase, breast changes    PAST MEDICAL HISTORY:  Past Medical History:   Diagnosis Date    Asthma     Cancer [facility] [address]     breast    Cancer ([facility] [address]     endometrial    Congestive heart failure, unspecified     CRI (chronic renal insufficiency)     Depression     Diabetes ([facility] [address]     Hypertension     Severe sepsis with acute organ dysfunction ([facility] [address] 5/11/2021       PAST SURGICAL HISTORY:  Past Surgical History:   Procedure Laterality Date    HX HERNIA REPAIR      HX HYSTERECTOMY      HX MASTECTOMY      IR INSERT NON TUNL CVC OVER 5 YRS  4/26/2021    IR INSERT TUNL CVC W/O PORT OVER 5 YR  5/5/2021       FAMILY HISTORY:  No family history on file.     SOCIAL HISTORY:  Social History     Socioeconomic History    Marital status:      Spouse name: Not on file    Number of children: Not on file    Years of education: Not on file    Highest education level: Not on file   Tobacco Use    Smoking status: Never Smoker    Smokeless tobacco: Never Used   Substance and Sexual Activity    Alcohol use: Not Currently Social Determinants of Health     Financial Resource Strain:     Difficulty of Paying Living Expenses:    Food Insecurity:     Worried About Running Out of Food in the Last Year:     [address] in the Last Year:    Transportation Needs:     Lack of Transportation (Medical):  Lack of Transportation (Non-Medical):    Physical Activity:     Days of Exercise per Week:     Minutes of Exercise per Session:    Stress:     Feeling of Stress :    Social Connections:     Frequency of Communication with Friends and Family:     Frequency of Social Gatherings with Friends and Family:     Attends Christian Services:     Active Member of Clubs or Organizations:     Attends Club or Organization Meetings:     Marital Status:        LABORATORY RESULTS:     Labs Latest Ref Rng & Units 5/22/2021 5/21/2021 5/20/2021 5/19/2021 5/18/2021 5/17/2021 5/16/2021   WBC 3.6 - 11.0 K/uL 4.2 4.1 4.8 3.5(L) 3.7 3.7 4.1   RBC 3.80 - 5.20 M/uL 2.97(L) 3.18(L) 3.35(L) 3.38(L) 3.29(L) 3.08(L) 3.22(L)   Hemoglobin 11.5 - 16.0 g/dL 8. 1(L) 8.6(L) 9.0(L) 9.1(L) 9.0(L) 8.6(L) 8.8(L)   Hematocrit 35.0 - 47.0 % 27. 7(L) 29. 0(L) 30. 4(L) 31. 2(L) 30. 0(L) 28. 3(L) 29. 9(L)   MCV 80.0 - 99.0 FL 93.3 91.2 90.7 92.3 91.2 91.9 92.9   Platelets 531 - 982 K/uL 133(L) 152 154 143(L) 139(L) 127(L) 134(L)   Lymphocytes 12 - 49 % 11(L) 11(L) 11(L) 11(L) 5(L) 11(L) 9(L)   Monocytes 5 - 13 % 8 9 8 13 12 13 11   Eosinophils 0 - 7 % 0 0 0 0 0 0 0   Basophils 0 - 1 % 1 1 1 1 0 1 0   Bands 0 - 6 % - - - - - - -   Albumin 3.5 - 5.0 g/dL 2. 9(L) 2. 9(L) 3.0(L) 3.0(L) 3. 2(L) 3. 1(L) 3.4(L)   Calcium 8.5 - 10.1 MG/DL 9.0 8.8 9.0 8.8 8.7 8.7 9.1   Glucose 65 - 100 mg/dL 125(H) 142(H) 124(H) 116(H) 130(H) 111(H) 119(H)   BUN 6 - 20 MG/DL 28(H) 20 35(H) 27(H) 51(H) 41(H) 28(H)   Creatinine 0.55 - 1.02 MG/DL 4.97(H) 3.45(H) 4.77(H) 3.86(H) 5.74(H) 4.88(H) 3.63(H)   Sodium 136 - 145 mmol/L 135(L) 136 134(L) 139 135(L) 136 136   Potassium 3.5 - 5.1 mmol/L 3.9 3.7 4.0 3.9 4.0 4.2 4.0   TSH 0.36 - 3.74 uIU/mL - - - - - - -   LDH 81 - 246 U/L 192 214 216 186 199 156 160   Some recent data might be hidden     Lab Results   Component Value Date/Time    TSH 1.60 05/15/2021 03:14 AM    TSH 5.36 (H) 04/24/2021 04:32 AM    TSH 3.99 (H) 04/17/2021 04:54 AM    TSH 2.980 10/01/2020 12:00 AM       ALLERGY:  Allergies   Allergen Reactions    Benzodiazepines Other (comments)     Respiratory issues        CURRENT MEDICATIONS:    Current Facility-Administered Medications:     warfarin (COUMADIN) tablet 2.5 mg, 2.5 mg, Oral, ONCE, Braxton, Triny B, NP    hydrOXYzine HCL (ATARAX) tablet 25 mg, 25 mg, Oral, TID PRN, Aggarwal Counter, NP, 25 mg at 05/21/21 0431    albuterol-ipratropium (DUO-NEB) 2.5 MG-0.5 MG/3 ML, 3 mL, Nebulization, Q6H RT, Braxton, Triny B, NP, 3 mL at 05/22/21 0739    simethicone (MYLICON) tablet 80 mg, 80 mg, Oral, QID PRN, JAJA WardALECIA MCGREGOR MD    epoetin amalia-epbx (RETACRIT) injection 20,000 Units, 20,000 Units, SubCUTAneous, Q TUE, THU & SAT, Reyes Fern, MD, 20,000 Units at 05/20/21 2130    artificial saliva (MOUTH KOTE) 1 Spray, 1 Spray, Oral, PRN, Braxton, Triny B, NP    cephALEXin (KEFLEX) capsule 250 mg, 250 mg, Oral, BID, Braxton, Triny B, NP, 250 mg at 05/22/21 0820    PARoxetine (PAXIL) tablet 20 mg, 20 mg, Oral, DAILY, Festus Ribeiro MD, 20 mg at 05/22/21 0820    hydrocortisone (CORTAID) 1 % cream, , Topical, PRN, Festus Ribeiro MD    senna-docusate (PERICOLACE) 8.6-50 mg per tablet 1 Tab, 1 Tablet, Oral, DAILY, Braxton, Triny B, NP, 1 Tablet at 05/17/21 1237    glucose chewable tablet 16 g, 4 Tablet, Oral, PRN, Elda Pineda MD    dextrose (D50W) injection syrg 12.5-25 g, 25-50 mL, IntraVENous, PRN, Elda Pineda MD    glucagon [facility]) injection 1 mg, 1 mg, IntraMUSCular, PRN, Elda Pineda MD    insulin lispro (HUMALOG) injection, , SubCUTAneous, AC&HS, Gudelia Moya MD, 2 Units at 05/21/21 1212    famotidine (PEPCID) tablet 20 mg, 20 mg, Oral, DAILY, Kody Palmer MD, 20 mg at 05/22/21 0820    benzocaine-zinc cl-benzalkonium cl (ORAJEL) 20-0.1-0.02 % mucosal gel, , Oral, PRN, Kody Palmer MD    magic mouthwash cpd (without sucralfate), 5 mL, Oral, DAILY PRN, Kody Palmer MD, 5 mL at 05/19/21 1507    anidulafungin (ERAXIS) 100 mg in 0.9% sodium chloride 130 mL IVPB, 100 mg, IntraVENous, Q24H, Anjali Méndez MD, 100 mg at 05/21/21 1807    balsam peru-castor oiL (VENELEX) ointment, , Topical, BID, Kody Palmer MD, Given at 05/22/21 0825    albumin human 25% (BUMINATE) solution 50 g, 50 g, IntraVENous, DIALYSIS PRN, Jaspreet Mendoza MD    heparin (porcine) 1,000 unit/mL injection 1,900 Units, 1,900 Units, InterCATHeter, DIALYSIS PRN, 1,900 Units at 05/18/21 1522 **AND** heparin (porcine) 1,000 unit/mL injection 1,900 Units, 1,900 Units, InterCATHeter, DIALYSIS PRN, Jaspreet Mendoza MD, 1,900 Units at 05/18/21 1521    hydrALAZINE (APRESOLINE) 20 mg/mL injection 20 mg, 20 mg, IntraVENous, Q6H PRN, Braxton, Triny B, NP, 20 mg at 05/21/21 2335    hydrALAZINE (APRESOLINE) 20 mg/mL injection 10 mg, 10 mg, IntraVENous, Q6H PRN, Braxton, Triny B, NP, 10 mg at 05/13/21 1052    ranolazine ER (RANEXA) tablet 1,000 mg, 1,000 mg, Oral, BID, Braxton, Triny B, NP, 1,000 mg at 05/22/21 0820    hydrALAZINE (APRESOLINE) tablet 100 mg, 100 mg, Oral, TID, Braxton, Triny B, NP, 100 mg at 05/22/21 0820    isosorbide mononitrate ER (IMDUR) tablet 30 mg, 30 mg, Oral, DAILY, Triny Limon NP, 30 mg at 05/22/21 0820    tafamidis cap 61 mg (Patient Supplied), 61 mg, Oral, DAILY, Kody Palmer MD, 61 mg at 05/22/21 0821    ondansetron (ZOFRAN) injection 4 mg, 4 mg, IntraVENous, Q4H PRN, Valeriy Morrow MD    zinc oxide-cod liver oil (DESITIN) 40 % paste, , Topical, Q12H, Michael MCGREGOR MD, Given at 05/22/21 0900    sodium chloride (NS) flush 5-10 mL, 5-10 mL, IntraVENous, PRN, Juan Daniel Cardoso MD  [facility] sodium chloride (NS) flush 5-40 mL, 5-40 mL, IntraVENous, Q8H, Rema Montanez MD, 10 mL at 05/22/21 0620    sodium chloride (NS) flush 5-40 mL, 5-40 mL, IntraVENous, PRN, Rema Montanez MD    acetaminophen (TYLENOL) tablet 650 mg, 650 mg, Oral, Q6H PRN **OR** acetaminophen (TYLENOL) suppository 650 mg, 650 mg, Rectal, Q6H PRN, Rema Montanez MD    Warfarin - Pharmacy to Dose, , Other, Rx Dosing/Monitoring, Kent Luis Angel, NP    sodium chloride (NS) flush 5-40 mL, 5-40 mL, IntraVENous, Q8H, Triny Limon, NP, 10 mL at 05/22/21 5035    sodium chloride (NS) flush 5-40 mL, 5-40 mL, IntraVENous, PRN, Livier Limon, NP    nystatin (MYCOSTATIN) 100,000 unit/gram powder, , Topical, PRN, Idalia Sears MD    PATIENT CARE TEAM:  Patient Care Team:  Yasmin Gallegos NP as PCP - General (Nurse Practitioner)  Manuelito Murphy MD (Cardiology)  Adrian Gasca MD as Physician (Cardiology)  Chanel Massey LPN as Care Coordinator  Baljeet Levi MD (Cardiothoracic Surgery)  Kimmie Burgos MD (Cardiology)     Thank you for allowing me to participate in this patient's care.     Andi Mendosa MD PhD  [address] [place] [address], [address]  Phone: [phone]  Fax: [phone]

## 2021-05-22 NOTE — PROGRESS NOTES
1930: Bedside and Verbal shift change report given to [facility], [facility] (oncoming nurse) by Chucky Dela Cruz RN (offgoing nurse). Report included the following information SBAR, Kardex, ED Summary, Intake/Output, MAR, Recent Results and Cardiac Rhythm VPACED.     0730: Bedside and Verbal shift change report given to Mason Alfonso RN (oncoming nurse) by Michael Jenkins RN (offgoing nurse). Report included the following information SBAR, Kardex, ED Summary, Intake/Output, MAR, Recent Results and Cardiac Rhythm VPACED.

## 2021-05-22 NOTE — PROCEDURES
[facility]                         [phone]  Vitals Pre Post Assessment Pre Post   BP MAP 70 MAP 76 LOC A&O x 3 A&O x 3   HR 72 78 Lungs clear clear   Temp 98.9 98.7 Cardiac LVAD/paced LVAD/paced   Resp 16 18 Skin Warm/dry Warm/dry   Weight 108.5kgs 106.5kgs Edema generalized generalized   Tele status LVAD LVAD Pain denies denies     Orders   Duration: Start: 1300 End: 1600 Total: 3hrs   Dialyzer: Dialyzer/Set Up Inspection: Revaclear (05/22/21 1245)   K Bath: Dialysate K (mEq/L): 3.5 (05/22/21 1300)   Ca Bath: Dialysate CA (mEq/L): 2.5 (05/22/21 1300)   Na / Bicarb: Dialysate NA (mEq/L): 140 (05/22/21 1300)  / 35   Target Fluid Removal: Goal/Amount of Fluid to Remove (mL): 2000 mL (05/22/21 1245)     Access   Type & Location: R IJ PC   Comments:    Prepped per policy. Dsg dated 5/21 remains clean, dry and intact. No redness, swelling or drainage noted. Flushed and heparinized post tx. Secured with sterile end caps. Labs   Hep B status / date:  Ag neg 5/19/21    Ab susceptible 4/27/21 in [facility]   Obtained/Reviewed  Critical Results Called HGB   Date Value Ref Range Status   05/22/2021 8.1 (L) 11.5 - 16.0 g/dL Final     Potassium   Date Value Ref Range Status   05/22/2021 3.9 3.5 - 5.1 mmol/L Final     Calcium   Date Value Ref Range Status   05/22/2021 9.0 8.5 - 10.1 MG/DL Final     BUN   Date Value Ref Range Status   05/22/2021 28 (H) 6 - 20 MG/DL Final            Meds Given   Name Dose Route   Heparin 1000u/ml 1.9ml IC / venous port   Heparin 1000u/ml 1.9ml IC / arteria port     Total Liters Process: 72 liters   Net Fluid Removed: 2kgs      Comments   Time Out Done: 6386   Admitting Diagnosis: ROCIO   Consent obtained/signed: Informed Consent Verified: Yes (05/22/21 1245)   Machine / RO # Machine Number: [id number] / Pricilla Montejo (05/22/21 4815)   Primary Nurse Rpt Pre: Cindy Anderson RN   Primary Nurse Rpt Post: Cindy Anderson RN    Pt Education: CVC care, renal diet   Care Plan: Continue current tx plan   Pts outpatient clinic: Being set up   Tx Summary: Pt tolerated tx well with 2kgs removed. Report given. Bed locked, in low position with call bell in reach.

## 2021-05-22 NOTE — PROGRESS NOTES
05/21/21 2106   Pre-Treatment   Cough   (none noted)   Breath Sounds Bilateral Clear;Middle; Lower;Diminished   Pulse 97   Resps 16   Post-Treatment   Treatment tolerance Poor  (pt not able, pt confused, not following directions)   Chest Physiotherapy Tx   CPT Initial Treatment Flutter   Chest Physiotherapy Tx   Chest Site Full range

## 2021-05-22 NOTE — PROGRESS NOTES
Pharmacist Note  Warfarin Dosing  Consult provided for this 78 y. o.female to manage warfarin for LVAD    INR Goal: 1.8-2.5  Home regimen/ tablet size: 5 mg daily    Drugs that may increase INR: None  Drugs that may decrease INR: None  Other current anticoagulants/ drugs that may increase bleeding risk: heparin  Risk factors: Age > 65, dialysis  Daily INR ordered: YES    Recent Labs     05/22/21  0409 05/21/21  0029 05/20/21  0445 05/20/21  0443   HGB 8.1* 8.6*  --  9.0*   INR 1.5* 2.9* 3.2*  --      Date               INR                  Dose  5/11  3.3  hold   5/12                1.9                    2 mg      5/13                 1.4                   4 mg     5/14                 1.4                   6 mg      5/15  1.9  4 mg  5/16  3.1  HOLD  5/17                 4.1                  HOLD     5/18                 4.2                  HOLD   5/19                 3.9                  HOLD   5/20  3.2  HOLD  5/21  2.9  HOLD  5/22  1.5  2.5 mg                                                                              Assessment/ Plan: Will order warfarin 2.5 mg x 1 today     Pharmacy will continue to monitor daily and adjust therapy as indicated. Please contact the pharmacist at  for outpatient recommendations if needed.

## 2021-05-22 NOTE — PROGRESS NOTES
05/21/21 2142   Oxygen Therapy   O2 Sat (%) 92 %   Pulse via Oximetry 81 beats per minute   O2 Device Other (comment)  (Trilogy)   O2 Flow Rate (L/min) 0 l/min   Respiratory   Respiratory Pattern Regular   CPAP/BIPAP   CPAP/BIPAP Start/Stop On   Device Mode AVAP   AVAP Set Resp Rate 5   AVAP Set VT (ml) 465   Mask Type and Size Medium; Full face   Skin Condition Intact   PIP Observed 30 cm H20  (Max pressure)   IPAP (cm H2O)   (PS max 15 min)   EPAP (cm H2O)   (Max 20  Min 5)   Vt Spont (ml) 534 ml   Ve Observed (l/min) 9.6 l/min   Backup Rate 5   Total RR (Spontaneous) 18 breaths per minute   Insp Rise Time (sec) 3   Leak (Estimated) 39 L/min   Pt's Home Machine Yes (type/vendor)  (Trilogy, no heater)     Family brought in a back up mask for patient to use.

## 2021-05-23 NOTE — PROGRESS NOTES
[address] [place] [address] E [facility]  Inpatient Consult Progress Note      Patient name: Royal Courser  Patient : 1950  Patient MRN: [id number]  Consulting MD: Viry Cueto MD    Date of service: 21    REASON FOR CONSULT:  Acute on chronic systolic heart failure     PLAN:  Okay to discharge home from AHF perspective  Should follow with AHF Clinic at [facility] within one week  Schedule appointment at [address] [place] [address] at [facility] to establish shared care  Consider MOCA and referral for neurocognitive testing OP    Continue set speed 9400rpm, low speed 9000rpm   Continue hydralazine 100mg TID, increase imdur to 30mg twice daily  Cont Tafamidis 61mg daily  Intolerant to BB/ACEi/ARB/ARNI due to aTTR  Intolerant to MRA due to hyperkalemia  Volume management per Nephrology; strict I/O, standing weights  Pulmonary hygiene including IS and nebulizers   Start Chest PT  Cont Eraxis until .    ID recommendations appreciated for C. Albicans in urine and sputum   Blood cultures negative   Drive line culture- rare diphtheroids   Repeat Urine culture via straight cath  Coumadin dose per pharmacy; goal INR lowered to 1.8-2.5 due to GIB  Resume home keflex dose- d/w with ID  Appreciate EP recommendations- no changes to current therapy.    TSH 1.60- monitor for now   Bowel regimen  Advance diet   PT/OT   Standing weights only  Cont to avoid O2 on patient due to intolerance   Up to date on flu, PNA, and COVID vaccinations   Will need to start DC plan for home with PT/OT /monday      IMPRESSION:  Likely urosepsis  Acute respiratory failure   Chronic RV failure  Coronary artery disease  · [facility] (2016) high grade ramus and small PDA disease, borderline disease of LAD and takeoff of pRCA  Chronic systolic heart failure  · Stage C, NYHA class IV improved to IIIA symptoms with LVAD  · Combined ischemic and non-ischemic cardiomyopathy, 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 Alma Mao)  · C/b delayed extubation due to severe COPD  · C/b critical illness polyneuropathy  · C/b prolonged hospitalization post-LVAD, 55 days  · C/b sternal wound infection s/p debridement (by Dr. Elvie Rios) s/p wound vac  · C/b sacral ulcer  · Would culture positive for Staph aureus, not MRSA  · C/b LVAD site drainage, improved  S/p CRT-ICD  · ICD fired due to electrolyte imbalance (2011)  H/o breast cancer (1992)   · s/p bilateral mastectomy/chemo and endometrial cancer s/p hysterectomy  · Lymphedema of LLE due to cancer treatment  Severe COPD with FEV1 50%  Depression  Atrial fibrillation  H/o \"two mini strokes\"  S/p fall with hip facture   · Right hip hemmiarthroplasty (5/23/18) by Dr. Kathe Amaya)  · S/p removed hip hardwarare due to pain (4/15/19)  COPD severe  CKD, stage 4- on HD  Hyperkalemia, resolved  Pulmonary hypertension  Cardiac risk factors:  · Morbid obesity, Body mass index is 42.29 kg/m².   · DM2 insulin dependent  · JED on Trilogy  · HL  Urinary incontinence, severe  · no procedures due to anticoagulation  Endometrial cancer (2002)  HTN  HL     Recent Events:  Slept well  No acute overnight events  PCT normalized   Some lose stools, may need priobiotics  ONe record of hypertension     CARDIAC IMAGING:  TTE 5/11/21 LVEF < 15%, LVIDd 5.96cm, TAPSE 0.99cm, RVIDd 4.14cm  TTE 4/19 shows improved LVIDd, 6.68 cm with RVIDd 5.14 cm and TAPSE 1.1 cm   TTE 4/28/21 LVIDd 7.37cm, RVIDd 5.27cm, TAPSE 1.44cm   Echo (9/24/19) LVEF 20-25%, AV opens 1:1, no AR  Echo (5/29/18) LVEF 10-%, ramps study done, report in [facility]  Echo (1/9/18) ramp study done, LVEDD 7.1cm  C (11/9/18) 2 vessel disease with 90% OM, 80% PDA, DSA to PDA branch  RHC 4/20 showed adequate Sal CI 3.0 but severely elevated RA pressure 24 mmHg  CXR negative 4/27/21     INTERVAL HISTORY:  Ms. Roger Poon is J 89 y.o.  Female s/p LVAD implant with HM 2 and ESRD requiring HD that was recently admitted for RV failure and worsening renal function. She was discharged on 5/5/21 to [facility] for rehabilitation before transitioning home to live her with daughter. In the last few days she had worsening mental status changed, decreased UOP and respiratory distress after possible aspiration- she was intubated and admitted to [facility] for further management. It appears patient condition was triggered by candida UTI and likely intolerance of BIPAP - inadequately supporting ventilation in rehab - patient is dependent on Trilogy support. LVAD INTERROGATION:  Device interrogated in person  Device function normal, normal flow, no events  LVAD   Pump Speed (RPM): 9400  Pump Flow (LPM): 5  MAP: 76  PI (Pulsitility Index): 6.2  Power: 5.6   Test: No  Back Up  at Bedside & Labeled: Yes  Power Module Test: No  Driveline Site Care: No  Driveline Dressing: Clean, Dry, and Intact  Outpatient: No  MAP in Therapeutic Range (Outpatient): No  Testing  Alarms Reviewed: Yes  Back up SC speed: 9400  Back up Low Speed Limit: 9000  Emergency Equipment with Patient?: Yes  Emergency procedures reviewed?: No  Drive line site inspected?: No (covered by dressing)  Drive line intergrity inspected?: Yes  Drive line dressing changed?: No    PHYSICAL EXAM:  Visit Vitals  /80 Comment: treatment ended. Blood returned   Pulse 91   Temp 98.2 °F (36.8 °C)   Resp 22   Ht 5' 7\" (1.702 m)   Wt 229 lb 6.4 oz (104.1 kg)   SpO2 93%   BMI 35.93 kg/m²     General: Patient is well developed, well-nourished in no acute distress  HEENT: Normocephalic and atraumatic. No scleral icterus. Pupils are equal, round and reactive to light and accomodation. No conjunctival injection. Oropharynx is clear. Neck: Supple. No evidence of thyroid enlargements or lymphadenopathy. JVD: Cannot be appreciated   Lungs: Breath sounds are equal and clear bilaterally.  No wheezes, rhonchi, or rales. Heart: Regular rate and rhythm with normal S1 and S2. No murmurs, gallops or rubs. Abdomen: Soft, no mass or tenderness. No organomegaly or hernia. Bowel sounds present. Genitourinary and rectal: deferred  Extremities: No cyanosis, clubbing, or edema. Neurologic: No focal sensory or motor deficits are noted. Grossly intact. Psychiatric: Awake, alert an doriented x 3. Appropriate mood and affect. Skin: Warm, dry and well perfused. No lesions, nodules or rashes are noted. REVIEW OF SYSTEMS:  General: Denies fever, night sweats. Ear, nose and throat: Denies difficulty hearing, sinus problems, runny nose, post-nasal drip, ringing in ears, mouth sores, loose teeth, ear pain, nosebleeds, sore throate, facial pain or numbess  Cardiovascular: see above in the interval history  Respiratory: Denies cough, wheezing, sputum production, hemoptysis. Gastrointestinal: Denies heartburn, constipation, intolerance to certain foods, diarrhea, abdominal pain, nausea, vomiting, difficulty swallowing, blood in stool  Kidney and bladder: Denies painful urination, frequent urination, urgency, prostate problems and impotence  Musculoskeletal: Denies joint pain, muscle weakness  Skin and hair: Denies change in existing skin lesions, hair loss or increase, breast changes    PAST MEDICAL HISTORY:  Past Medical History:   Diagnosis Date    Asthma     Cancer ([address]     breast    Cancer ([address]     endometrial    Congestive heart failure, unspecified     CRI (chronic renal insufficiency)     Depression     Diabetes ([address]     Hypertension     Severe sepsis with acute organ dysfunction ([address] 5/11/2021       PAST SURGICAL HISTORY:  Past Surgical History:   Procedure Laterality Date    HX HERNIA REPAIR      HX HYSTERECTOMY      HX MASTECTOMY      IR INSERT NON TUNL CVC OVER 5 YRS  4/26/2021    IR INSERT TUNL CVC W/O PORT OVER 5 YR  5/5/2021       FAMILY HISTORY:  No family history on file.     SOCIAL HISTORY:  Social History     Socioeconomic History    Marital status:      Spouse name: Not on file    Number of children: Not on file    Years of education: Not on file    Highest education level: Not on file   Tobacco Use    Smoking status: Never Smoker    Smokeless tobacco: Never Used   Substance and Sexual Activity    Alcohol use: Not Currently     Social Determinants of Health     Financial Resource Strain:     Difficulty of Paying Living Expenses:    Food Insecurity:     Worried About Running Out of Food in the Last Year:     [address] in the Last Year:    Transportation Needs:     Lack of Transportation (Medical):  Lack of Transportation (Non-Medical):    Physical Activity:     Days of Exercise per Week:     Minutes of Exercise per Session:    Stress:     Feeling of Stress :    Social Connections:     Frequency of Communication with Friends and Family:     Frequency of Social Gatherings with Friends and Family:     Attends Taoism Services:     Active Member of Clubs or Organizations:     Attends Club or Organization Meetings:     Marital Status:        LABORATORY RESULTS:     Labs Latest Ref Rng & Units 5/23/2021 5/22/2021 5/21/2021 5/20/2021 5/19/2021 5/18/2021 5/17/2021   WBC 3.6 - 11.0 K/uL 4.2 4.2 4.1 4.8 3.5(L) 3.7 3.7   RBC 3.80 - 5.20 M/uL 3.26(L) 2.97(L) 3.18(L) 3.35(L) 3.38(L) 3.29(L) 3.08(L)   Hemoglobin 11.5 - 16.0 g/dL 8.9(L) 8. 1(L) 8.6(L) 9.0(L) 9.1(L) 9.0(L) 8.6(L)   Hematocrit 35.0 - 47.0 % 29. 8(L) 27. 7(L) 29. 0(L) 30. 4(L) 31. 2(L) 30. 0(L) 28. 3(L)   MCV 80.0 - 99.0 FL 91.4 93.3 91.2 90.7 92.3 91.2 91.9   Platelets 739 - 603 K/uL 139(L) 133(L) 152 154 143(L) 139(L) 127(L)   Lymphocytes 12 - 49 % 10(L) 11(L) 11(L) 11(L) 11(L) 5(L) 11(L)   Monocytes 5 - 13 % 8 8 9 8 13 12 13   Eosinophils 0 - 7 % 0 0 0 0 0 0 0   Basophils 0 - 1 % 1 1 1 1 1 0 1   Bands 0 - 6 % - - - - - - -   Albumin 3.5 - 5.0 g/dL 3. 0(L) 2. 9(L) 2. 9(L) 3.0(L) 3.0(L) 3. 2(L) 3. 1(L)   Calcium 8.5 - 10.1 MG/DL 8.8 9.0 8.8 9.0 8.8 8.7 8.7   Glucose 65 - 100 mg/dL 112(H) 125(H) 142(H) 124(H) 116(H) 130(H) 111(H)   BUN 6 - 20 MG/DL 14 28(H) 20 35(H) 27(H) 51(H) 41(H)   Creatinine 0.55 - 1.02 MG/DL 3.14(H) 4.97(H) 3.45(H) 4.77(H) 3.86(H) 5.74(H) 4.88(H)   Sodium 136 - 145 mmol/L 136 135(L) 136 134(L) 139 135(L) 136   Potassium 3.5 - 5.1 mmol/L 3.8 3.9 3.7 4.0 3.9 4.0 4.2   TSH 0.36 - 3.74 uIU/mL - - - - - - -   LDH 81 - 246 U/L 210 192 214 216 186 199 156   Some recent data might be hidden     Lab Results   Component Value Date/Time    TSH 1.60 05/15/2021 03:14 AM    TSH 5.36 (H) 04/24/2021 04:32 AM    TSH 3.99 (H) 04/17/2021 04:54 AM    TSH 2.980 10/01/2020 12:00 AM       ALLERGY:  Allergies   Allergen Reactions    Benzodiazepines Other (comments)     Respiratory issues        CURRENT MEDICATIONS:    Current Facility-Administered Medications:     L.acidophilus-paracasei-S.thermophil-bifidobacter (RISAQUAD) 8 billion cell capsule, 1 Capsule, Oral, DAILY, MARIO Ward MD, 1 Capsule at 05/22/21 1632    loperamide (IMODIUM) capsule 2 mg, 2 mg, Oral, Q4H PRN, JALEEL Ward MD    albuterol-ipratropium (DUO-NEB) 2.5 MG-0.5 MG/3 ML, 3 mL, Nebulization, BID RT, Dirk Cockayne, MD, 3 mL at 05/22/21 1959    hydrOXYzine HCL (ATARAX) tablet 25 mg, 25 mg, Oral, TID PRN, Mariely Gallegos NP, 25 mg at 05/21/21 0431    simethicone (MYLICON) tablet 80 mg, 80 mg, Oral, QID PRN, JUAN ANTONIO Ward MD    epoetin amalia-epbx (RETACRIT) injection 20,000 Units, 20,000 Units, SubCUTAneous, Q TUE, THU & SAT, Kylah Maciel MD, 20,000 Units at 05/22/21 2038    artificial saliva (MOUTH KOTE) 1 Spray, 1 Spray, Oral, PRN, Braxton, Triny B, NP    cephALEXin (KEFLEX) capsule 250 mg, 250 mg, Oral, BID, Braxton, Triny B, NP, 250 mg at 05/22/21 2027    PARoxetine (PAXIL) tablet 20 mg, 20 mg, Oral, DAILY, [place], MD Festus, 20 mg at 05/22/21 0820    hydrocortisone (CORTAID) 1 % cream, , Topical, PRN, MD Micah Mcgheein glucose chewable tablet 16 g, 4 Tablet, Oral, PRN, Elda Pineda MD    dextrose (D50W) injection syrg 12.5-25 g, 25-50 mL, IntraVENous, PRN, Elda Pineda MD    glucagon [facility]) injection 1 mg, 1 mg, IntraMUSCular, PRN, Elda Pineda MD    insulin lispro (HUMALOG) injection, , SubCUTAneous, AC&HS, Boby Rojas MD, 2 Units at 05/21/21 1212    famotidine (PEPCID) tablet 20 mg, 20 mg, Oral, DAILY, Grant Christian MD, 20 mg at 05/22/21 0820    benzocaine-zinc cl-benzalkonium cl (ORAJEL) 20-0.1-0.02 % mucosal gel, , Oral, PRN, Grant Christian MD    magic mouthwash cpd (without sucralfate), 5 mL, Oral, DAILY PRN, Grant Christian MD, 5 mL at 05/19/21 1507    anidulafungin (ERAXIS) 100 mg in 0.9% sodium chloride 130 mL IVPB, 100 mg, IntraVENous, Q24H, Chandrakant Barraza MD, 100 mg at 05/22/21 1840    balsam peru-castor oiL (VENELEX) ointment, , Topical, BID, Grant Christian MD, Given at 05/22/21 1828    albumin human 25% (BUMINATE) solution 50 g, 50 g, IntraVENous, DIALYSIS PRN, Jaspreet Mendoza MD    heparin (porcine) 1,000 unit/mL injection 1,900 Units, 1,900 Units, InterCATHeter, DIALYSIS PRN, 1,900 Units at 05/22/21 1542 **AND** heparin (porcine) 1,000 unit/mL injection 1,900 Units, 1,900 Units, InterCATHeter, DIALYSIS PRN, Dat Miranda MD, 1,900 Units at 05/22/21 1542    hydrALAZINE (APRESOLINE) 20 mg/mL injection 20 mg, 20 mg, IntraVENous, Q6H PRN, Triny Limon NP, 20 mg at 05/21/21 2335    hydrALAZINE (APRESOLINE) 20 mg/mL injection 10 mg, 10 mg, IntraVENous, Q6H PRN, Braxton, Triny B, NP, 10 mg at 05/22/21 2330    ranolazine ER (RANEXA) tablet 1,000 mg, 1,000 mg, Oral, BID, Braxton, Triny B, NP, 1,000 mg at 05/22/21 2037    hydrALAZINE (APRESOLINE) tablet 100 mg, 100 mg, Oral, TID, Braxton, Triny B, NP, 100 mg at 05/22/21 2027    isosorbide mononitrate ER (IMDUR) tablet 30 mg, 30 mg, Oral, DAILY, Braxton, Triny B, NP, 30 mg at 05/22/21 0820    tafamidis cap 61 mg (Patient Supplied), 61 mg, Oral, DAILY, Colby Cisse MD, 61 mg at 05/22/21 0821    ondansetron (ZOFRAN) injection 4 mg, 4 mg, IntraVENous, Q4H PRN, Phan Morrow MD    zinc oxide-cod liver oil (DESITIN) 40 % paste, , Topical, Q12H, Myrna MCGREGOR MD, Given at 05/22/21 2331    sodium chloride (NS) flush 5-10 mL, 5-10 mL, IntraVENous, PRN, Jesus Meehan MD    sodium chloride (NS) flush 5-40 mL, 5-40 mL, IntraVENous, Q8H, Marcello Ybarra MD, 10 mL at 05/23/21 0753    sodium chloride (NS) flush 5-40 mL, 5-40 mL, IntraVENous, PRN, Marcello Ybarra MD    acetaminophen (TYLENOL) tablet 650 mg, 650 mg, Oral, Q6H PRN **OR** acetaminophen (TYLENOL) suppository 650 mg, 650 mg, Rectal, Q6H PRN, Marcello Ybarra MD    Warfarin - Pharmacy to Dose, , Other, Rx Dosing/Monitoring, April Srivastava NP    sodium chloride (NS) flush 5-40 mL, 5-40 mL, IntraVENous, Q8H, Triny Limon NP, 10 mL at 05/23/21 0754    sodium chloride (NS) flush 5-40 mL, 5-40 mL, IntraVENous, PRN, Rachelle Limon NP    nystatin (MYCOSTATIN) 100,000 unit/gram powder, , Topical, PRN, Myrna Wade MD    PATIENT CARE TEAM:  Patient Care Team:  Ian Montes NP as PCP - General (Nurse Practitioner)  Beatriz Solorzano MD (Cardiology)  Tereza Sethi MD as Physician (Cardiology)  Meredith Lopez LPN as Care Coordinator  Buffy Hussein MD (Cardiothoracic Surgery)  Colby Cisse MD (Cardiology)     Thank you for allowing me to participate in this patient's care.     Isidra Núñez MD PhD  [address] [place] [address], [address]  Phone: [phone]  Fax: [phone]

## 2021-05-23 NOTE — PROGRESS NOTES
Hospitalist Progress Note  Martha Cheung MD  Answering service: [phone] OR [phone] from in house phone      Date of Service:  2021  NAME:  Reji Ojeda  :  1950  MRN:  [id number]    Admission Summary: This is a 72-year-old female who has advanced at heart failure with ventricular assist device support is admitted for dyspnea. She is found to have sepsis on further work-up. Interval history / Subjective: Follow-up for dyspnea/CHF/sepsis   Patient is sitting in chair, dozing off, daughter at the bedside. Patient awoken and is interactive appropriately. Daughter stated that patient's off today. She usually gets somehow off a day after dialysis. Assessment & Plan:      #CAD, advanced heart failure, chronic systolic and diastolic CHF: BiVentricular , s/p LVAD, s/p ICD. 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 Resolved. On room air  - extubated. doing well on her home trilogy ventilator at night. #ROCIO on CKD stage 4: progressive CKD and CRS- started HD   - Nephro following: R- IJ permacath in situ- OP HD set up at [facility],  [facility]       #Sepsis likely source chronic surgical wound infection. Resolved. -Now back on home Keflex now. #Candida UTI: Continue Eraxis. ID help appreciated.             #. H/o breast cancer () - s/p bilateral mastectomy/chemo   #. Lymphedema of LLE due to cancer treatment  #. COPD: no acute exacerbation. FEV1 50%  #. AFib: AC as above   #. Hyperkalemia, resolved  #. Pulmonary hypertension  #.  Morbid obesity, BMI 42.29 kg/m². #. DM2: A1c 7.2, SSI, AccuChecks, monitor  #. JED on Trilogy  #. Urinary incontinence, severe  #. Endometrial cancer (2002)- s/p Hysterectomy  #. HTN: chronic, stable, Home regimen, PRN BP meds. Monitor  #. HLD: chronic, Stable, Home regimen  #Abdominal distention with gas pain: As needed simethicone. KUB with: Gaseous distention. -DC Colace  -Add probiotics and as needed Imodium. Discharge planning:  -Patient accepted to [address] [place] [address],[address] dialysis unit but available time only at 645 Monday Wednesday Friday, currently on a TTS schedule, patient and family session be difficult for them to arrange transportation. CM working on to set up for [facility] 2nd/3rd shift on TTS    Code status: Full  DVT prophylaxis: warfarin  Care Plan discussed with: Patient/Family and Nurse  Disposition: Home with home health PT OT,in 2-3 days . She will finish Eraxis on 5/23     Hospital Problems  Date Reviewed: 4/25/2021        Codes Class Noted POA    Severe sepsis with acute organ dysfunction [facility]) ICD-10-CM: A41.9, R65.20  ICD-9-CM: 038.9, 995.92 Acute 5/11/2021 Yes        * (Principal) Acute encephalopathy ICD-10-CM: G93.40  ICD-9-CM: 348.30  5/11/2021 Unknown            Review of Systems:   Pertinent items are mentioned in interval history. Vital Signs:    Last 24hrs VS reviewed since prior progress note. Most recent are:  Visit Vitals  /80   Pulse 81   Temp 98.4 °F (36.9 °C)   Resp 22   Ht 5' 7\" (1.702 m)   Wt 104.1 kg (229 lb 6.4 oz)   SpO2 94%   BMI 35.93 kg/m²         Intake/Output Summary (Last 24 hours) at 5/23/2021 1501  Last data filed at 5/23/2021 0730  Gross per 24 hour   Intake 130 ml   Output    Net 130 ml        Physical Examination:   Evaluated face to face and examined 05/23/21    General:   Sitting in a wheelchair by the bedside. Alert oriented x4. Not in distress. Card:  humming VAD sound. good peripheral perfusion  Resp:  No accessory muscle use, fair AE, no wheezes.  no crepitations  Abd:   Distended, normoactive, nontender. Extremities:   No edema. No cyanosis or clubbing, edema present. . Venous insufficiency discoloration. Neuro:  Grossly normal, no focal neuro deficits, follows commands, speech wnl. Psych:  Good insight, not agitated. Data Review:    Review and/or order of clinical lab test  Review and/or order of tests in the radiology section of [facility]  Review and/or order of tests in the medicine section of [facility]  Labs:     Recent Labs     05/23/21 0332 05/22/21  0409   WBC 4.2 4.2   HGB 8.9* 8.1*   HCT 29.8* 27.7*   * 133*     Recent Labs     05/23/21  0332 05/22/21 0409 05/21/21  0029    135* 136   K 3.8 3.9 3.7    100 100   CO2 27 25 27   BUN 14 28* 20   CREA 3.14* 4.97* 3.45*   * 125* 142*   CA 8.8 9.0 8.8   MG  --  2.5* 2.1   PHOS  --  4.7 3.6     Recent Labs     05/23/21  0332 05/22/21  0409 05/21/21  0029   ALT 6* 8* 8*   AP 54 53 58   TBILI 0.7 0.6 0.6   TP 7.2 7.1 7.3   ALB 3.0* 2.9* 2.9*   GLOB 4.2* 4.2* 4.4*     Recent Labs     05/23/21  0332 05/22/21  0409 05/21/21  0029   INR 1.3* 1.5* 2.9*   PTP 13.8* 15.6* 28.5*      Recent Labs     05/22/21  0409   FERR 212      Lab Results   Component Value Date/Time    Folate 10.0 10/28/2020 03:56 AM      No results for input(s): PH, PCO2, PO2 in the last 72 hours. No results for input(s): CPK, CKNDX, TROIQ in the last 72 hours.     No lab exists for component: CPKMB  Lab Results   Component Value Date/Time    Cholesterol, total 129 04/16/2021 10:40 AM    HDL Cholesterol 33 04/16/2021 10:40 AM    LDL, calculated 74 04/16/2021 10:40 AM    Triglyceride 110 04/16/2021 10:40 AM    CHOL/HDL Ratio 3.9 04/16/2021 10:40 AM     Lab Results   Component Value Date/Time    Glucose (POC) 141 (H) 05/23/2021 12:06 PM    Glucose (POC) 168 (H) 05/23/2021 11:28 AM    Glucose (POC) 116 05/23/2021 07:25 AM    Glucose (POC) 129 (H) 05/22/2021 09:16 PM    Glucose (POC) 98 05/22/2021 04:41 PM     Lab Results Component Value Date/Time    Color DARK YELLOW 05/22/2021 05:30 AM    Appearance CLOUDY (A) 05/22/2021 05:30 AM    Specific gravity 1.023 05/22/2021 05:30 AM    Specific gravity 1.013 11/09/2020 12:05 AM    pH (UA) 5.0 05/22/2021 05:30 AM    Protein 100 (A) 05/22/2021 05:30 AM    Glucose Negative 05/22/2021 05:30 AM    Ketone Negative 05/22/2021 05:30 AM    Bilirubin Negative 04/18/2021 05:08 PM    Urobilinogen 0.2 05/22/2021 05:30 AM    Nitrites Positive (A) 05/22/2021 05:30 AM    Leukocyte Esterase SMALL (A) 05/22/2021 05:30 AM    Epithelial cells FEW 05/22/2021 05:30 AM    Bacteria 1+ (A) 05/22/2021 05:30 AM    WBC 0-4 05/22/2021 05:30 AM    RBC 0-5 05/22/2021 05:30 AM     Medications Reviewed:     Current Facility-Administered Medications   Medication Dose Route Frequency    isosorbide mononitrate ER (IMDUR) tablet 30 mg  30 mg Oral BID    warfarin (COUMADIN) tablet 4 mg  4 mg Oral ONCE    L.acidophilus-paracasei-S.thermophil-bifidobacter (RISAQUAD) 8 billion cell capsule  1 Capsule Oral DAILY    loperamide (IMODIUM) capsule 2 mg  2 mg Oral Q4H PRN    albuterol-ipratropium (DUO-NEB) 2.5 MG-0.5 MG/3 ML  3 mL Nebulization BID RT    hydrOXYzine HCL (ATARAX) tablet 25 mg  25 mg Oral TID PRN    simethicone (MYLICON) tablet 80 mg  80 mg Oral QID PRN    epoetin amalia-epbx (RETACRIT) injection 20,000 Units  20,000 Units SubCUTAneous Q TUE, THU & SAT    artificial saliva (MOUTH KOTE) 1 Spray  1 Spray Oral PRN    cephALEXin (KEFLEX) capsule 250 mg  250 mg Oral BID    PARoxetine (PAXIL) tablet 20 mg  20 mg Oral DAILY    hydrocortisone (CORTAID) 1 % cream   Topical PRN    glucose chewable tablet 16 g  4 Tablet Oral PRN    dextrose (D50W) injection syrg 12.5-25 g  25-50 mL IntraVENous PRN    glucagon (GLUCAGEN) injection 1 mg  1 mg IntraMUSCular PRN    insulin lispro (HUMALOG) injection   SubCUTAneous AC&HS    famotidine (PEPCID) tablet 20 mg  20 mg Oral DAILY    benzocaine-zinc cl-benzalkonium cl (ORAJEL) 20-0.1-0.02 % mucosal gel   Oral PRN    magic mouthwash cpd (without sucralfate)  5 mL Oral DAILY PRN    anidulafungin (ERAXIS) 100 mg in 0.9% sodium chloride 130 mL IVPB  100 mg IntraVENous Q24H    balsam peru-castor oiL (VENELEX) ointment   Topical BID    albumin human 25% (BUMINATE) solution 50 g  50 g IntraVENous DIALYSIS PRN    heparin (porcine) 1,000 unit/mL injection 1,900 Units  1,900 Units InterCATHeter DIALYSIS PRN    And    heparin (porcine) 1,000 unit/mL injection 1,900 Units  1,900 Units InterCATHeter DIALYSIS PRN    hydrALAZINE (APRESOLINE) 20 mg/mL injection 20 mg  20 mg IntraVENous Q6H PRN    hydrALAZINE (APRESOLINE) 20 mg/mL injection 10 mg  10 mg IntraVENous Q6H PRN    ranolazine ER (RANEXA) tablet 1,000 mg  1,000 mg Oral BID    hydrALAZINE (APRESOLINE) tablet 100 mg  100 mg Oral TID    tafamidis cap 61 mg (Patient Supplied)  61 mg Oral DAILY    ondansetron (ZOFRAN) injection 4 mg  4 mg IntraVENous Q4H PRN    zinc oxide-cod liver oil (DESITIN) 40 % paste   Topical Q12H    sodium chloride (NS) flush 5-10 mL  5-10 mL IntraVENous PRN    sodium chloride (NS) flush 5-40 mL  5-40 mL IntraVENous Q8H    sodium chloride (NS) flush 5-40 mL  5-40 mL IntraVENous PRN    acetaminophen (TYLENOL) tablet 650 mg  650 mg Oral Q6H PRN    Or    acetaminophen (TYLENOL) suppository 650 mg  650 mg Rectal Q6H PRN    Warfarin - Pharmacy to Dose   Other Rx Dosing/Monitoring    sodium chloride (NS) flush 5-40 mL  5-40 mL IntraVENous Q8H    sodium chloride (NS) flush 5-40 mL  5-40 mL IntraVENous PRN    nystatin (MYCOSTATIN) 100,000 unit/gram powder   Topical PRN   ______________________________________________________________________  EXPECTED LENGTH OF STAY: 4d 19h  ACTUAL LENGTH OF STAY:          12               Teddy Houston MD

## 2021-05-23 NOTE — PROGRESS NOTES
0700: Bedside shift change report received by Brodie Medina (offgoing nurse). Report included the following information SBAR, Kardex, Procedure Summary, Intake/Output, MAR, Recent Results, and Cardiac Rhythm V paced . 1930: Bedside shift change report given to [facility] (oncoming nurse). Report included the following information SBAR, Kardex, Procedure Summary, Intake/Output, MAR, Recent Results and Cardiac Rhythm v paced. ------------------------  Care Plan 1748  Problem: Pressure Injury - Risk of  Goal: *Prevention of pressure injury  Description: Document Jaime Scale and appropriate interventions in the flowsheet. Outcome: Progressing Towards Goal  Note: Pressure Injury Interventions:  Sensory Interventions: Assess changes in LOC, Keep linens dry and wrinkle-free    Moisture Interventions: Maintain skin hydration (lotion/cream), Apply protective barrier, creams and emollients, Absorbent underpads, Check for incontinence Q2 hours and as needed, Internal/External urinary devices    Activity Interventions: Increase time out of bed, Pressure redistribution bed/mattress(bed type), PT/OT evaluation    Mobility Interventions: HOB 30 degrees or less, Pressure redistribution bed/mattress (bed type), PT/OT evaluation, Turn and reposition approx. every two hours(pillow and wedges)    Nutrition Interventions: Document food/fluid/supplement intake    Friction and Shear Interventions: Lift sheet                Problem: Falls - Risk of  Goal: *Absence of Falls  Description: Document Godfrey Fall Risk and appropriate interventions in the flowsheet.   Outcome: Progressing Towards Goal  Note: Fall Risk Interventions:  Mobility Interventions: Communicate number of staff needed for ambulation/transfer, OT consult for ADLs, Patient to call before getting OOB, PT Consult for mobility concerns    Mentation Interventions: Adequate sleep, hydration, pain control, Door open when patient unattended, Increase mobility, More frequent rounding, Room close to nurse's station, Reorient patient, Update white board    Medication Interventions: Evaluate medications/consider consulting pharmacy, Patient to call before getting OOB, Teach patient to arise slowly    Elimination Interventions: Call light in reach, Patient to call for help with toileting needs, Stay With Me (per policy), Toileting schedule/hourly rounds    History of Falls Interventions: Evaluate medications/consider consulting pharmacy, Investigate reason for fall, Room close to nurse's station

## 2021-05-23 NOTE — PROGRESS NOTES
Pharmacist Note  Warfarin Dosing  Consult provided for this 78 y. o.female to manage warfarin for LVAD    INR Goal: 1.8-2.5  Home regimen/ tablet size: 5 mg daily    Drugs that may increase INR: None  Drugs that may decrease INR: None  Other current anticoagulants/ drugs that may increase bleeding risk: heparin  Risk factors: Age > 65, dialysis  Daily INR ordered: YES    Recent Labs     05/23/21  0332 05/22/21  0409 05/21/21  0029   HGB 8.9* 8.1* 8.6*   INR 1.3* 1.5* 2.9*     Date               INR                  Dose  5/11  3.3  hold   5/12                1.9                    2 mg      5/13                 1.4                   4 mg     5/14                 1.4                   6 mg      5/15  1.9  4 mg  5/16  3.1  HOLD  5/17                 4.1                  HOLD     5/18                 4.2                  HOLD   5/19                 3.9                  HOLD   5/20  3.2  HOLD  5/21  2.9  HOLD  5/22  1.5  2.5 mg              5/23  1.3  4 mg                                                                  Assessment/ Plan: Will order warfarin 4 mg x 1 today     Pharmacy will continue to monitor daily and adjust therapy as indicated. Please contact the pharmacist at  for outpatient recommendations if needed.

## 2021-05-23 NOTE — PROGRESS NOTES
Problem: Mobility Impaired (Adult and Pediatric)  Goal: *Acute Goals and Plan of Care (Insert Text)  Description: FUNCTIONAL STATUS PRIOR TO ADMISSION: Patient was modified independent using a rollator for functional mobility. HOME SUPPORT PRIOR TO ADMISSION: The patient lived alone with her daughter to provide assistance as needed. Recent prolonged admission to [facility], discharged to rehab then readmitted from rehab. Physical Therapy Goals  Initiated 5/16/2021  1. Patient will move from supine to sit and sit to supine  and scoot up and down in bed with minimal assistance/contact guard assist within 7 day(s). 2.  Patient will transfer from bed to chair and chair to bed with supervision/set-up using the least restrictive device within 7 day(s). 3.  Patient will perform sit to stand with supervision/set-up within 7 day(s). 4.  Patient will ambulate with supervision/set-up for 50 feet with the least restrictive device within 7 day(s). 5.  Patient will ascend/descend 1 stairs without handrail(s) with supervision/set-up within 7 day(s). Outcome: Progressing Towards Goal    PHYSICAL THERAPY TREATMENT  Patient: Harrie Hamman (12 y.o. female)  Date: 5/23/2021  Diagnosis: Acute encephalopathy [G93.40] Acute encephalopathy       Precautions: Fall (prior LVAD)  Chart, physical therapy assessment, plan of care and goals were reviewed. ASSESSMENT  Patient continues with skilled PT services and is progressing towards goals. Pt agreeable to OOB. Pt required increase assistance with bed mobility. Pt required assistance of two for safety. Decrease LE stability. Pt was able to ambulate a short distance but fatigued with gait. Pt's daughter present. Discussed about discharge plan. Pt will be returning to her home with daughter assistance and HHPT. Pt may benefit from w/c van to assist with discharge into home.       Current Level of Function Impacting Discharge (mobility/balance): *min A x2    Other factors to consider for discharge: cognition, decrease mobility, assist level. Unable to go to inpt rehab         PLAN :  Patient continues to benefit from skilled intervention to address the above impairments. Continue treatment per established plan of care. to address goals. Recommendation for discharge: (in order for the patient to meet his/her long term goals)  Physical therapy at least 2 days/week in the home AND ensure assist and/or supervision for safety with ADLs and mobility     This discharge recommendation:  Has not yet been discussed the attending provider and/or case management    IF patient discharges home will need the following DME: patient owns DME required for discharge       SUBJECTIVE:   Patient stated Pablito Gordon you get me two batteries and clips .     OBJECTIVE DATA SUMMARY:   Critical Behavior:  Neurologic State: Alert (intermittent confusion)  Orientation Level: Oriented X4  Cognition: Follows commands  Safety/Judgement: Decreased insight into deficits, Decreased awareness of need for safety, Decreased awareness of need for assistance  Functional Mobility Training:  Bed Mobility:     Supine to Sit: Moderate assistance              Transfers:  Sit to Stand: Minimum assistance;Assist x2 2 trials   Stand to Sit: Minimum assistance;Assist x2        Bed to Chair: Minimum assistance;Assist x2                    Balance:  Sitting: Impaired  Sitting - Static: Fair (occasional)  Sitting - Dynamic: Fair (occasional)  Standing: Impaired  Standing - Static: Fair  Standing - Dynamic : Fair  Ambulation/Gait Training:  Distance (ft): 5 Feet (ft)  Assistive Device: Gait belt;Walker, rolling (chair follow. )  Ambulation - Level of Assistance: Minimal assistance;Assist x2        Gait Abnormalities: Decreased step clearance        Base of Support: Widened        Step Length: Left shortened;Right shortened               Stairs:               Therapeutic Exercises:     Pain Rating:  No complaints     Activity Tolerance: requires frequent rest breaks and observed SOB with activity    After treatment patient left in no apparent distress:   Sitting in chair, Call bell within reach, and Caregiver / family present    COMMUNICATION/COLLABORATION:   The patients plan of care was discussed with: Registered nurse.      Samuel Barraza PTA   Time Calculation: 23 mins

## 2021-05-23 NOTE — PROGRESS NOTES
TRANSITION OF CARE  RUR--38%  Disposition--Resume home health with [address]. Begin outpatient HD with [facility]. Location & time TBD. Transport-- Daughter would prefer wheelchair [facility]. Patient's primary family contact: daughter Gio Sanchez [phone]. CM met with patient and her daughter at their request. Daughter requested list of private pay personal care providers, and CM gave daughter a list as requested.

## 2021-05-24 NOTE — PROGRESS NOTES
[address] [place] [address] in [place], [place]  Inpatient Consult Progress Note      Patient name: Manuel Cartwright  Patient : 1950  Patient MRN: [id number]  Consulting MD: Unruly Simpson MD    Date of service: 21    REASON FOR CONSULT:  Acute on chronic systolic heart failure     PLAN:  Okay to discharge home from AHF perspective  Should follow with AHF Clinic at [facility] within one week  Schedule appointment at [address] [place] [address] at [facility] to establish shared care  Consider MOCA and referral for neurocognitive testing OP    Continue set speed 9400rpm, low speed 9000rpm   Continue hydralazine 100mg TID, increase imdur to 30mg twice daily  Cont Tafamidis 61mg daily  Intolerant to BB/ACEi/ARB/ARNI due to aTTR  Intolerant to MRA due to hyperkalemia  Volume management per Nephrology; strict I/O, standing weights  Pulmonary hygiene including IS and nebulizers   Albumin x 1     ID recommendations appreciated for C. Albicans in urine and sputum   Blood cultures negative   Drive line culture- rare diphtheroids   Repeat Urine culture via straight cath  Coumadin dose per pharmacy; goal INR lowered to 1.8-2.5 due to GIB  Resume home keflex dose- d/w with ID  Appreciate EP recommendations- no changes to current therapy.    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 Deanne Trimble)  · C/b delayed extubation due to severe COPD  · C/b critical illness polyneuropathy  · C/b prolonged hospitalization post-LVAD, 55 days  · C/b sternal wound infection s/p debridement (by Dr. Elvi Arndt) s/p wound vac  · C/b sacral ulcer  · Would culture positive for Staph aureus, not MRSA  · C/b LVAD site drainage, improved  S/p CRT-ICD  · ICD fired due to electrolyte imbalance (2011)  H/o breast cancer (1992)   · s/p bilateral mastectomy/chemo and endometrial cancer s/p hysterectomy  · Lymphedema of LLE due to cancer treatment  Severe COPD with FEV1 50%  Depression  Atrial fibrillation  H/o \"two mini strokes\"  S/p fall with hip facture   · Right hip hemmiarthroplasty (5/23/18) by Dr. Gisella Altamirano)  · S/p removed hip hardwarare due to pain (4/15/19)  COPD severe  CKD, stage 4- on HD  Hyperkalemia, resolved  Pulmonary hypertension  Cardiac risk factors:  · Morbid obesity, Body mass index is 42.29 kg/m². · DM2 insulin dependent  · JED on Trilogy  · HL  Urinary incontinence, severe  · no procedures due to anticoagulation  Endometrial cancer (2002)  HTN  HL     Recent Events:  No acute overnight events  PCT normalized   Confusion with dialysis      CARDIAC IMAGING:  TTE 5/11/21 LVEF < 15%, LVIDd 5.96cm, TAPSE 0.99cm, RVIDd 4.14cm  TTE 4/19 shows improved LVIDd, 6.68 cm with RVIDd 5.14 cm and TAPSE 1.1 cm   TTE 4/28/21 LVIDd 7.37cm, RVIDd 5.27cm, TAPSE 1.44cm   Echo (9/24/19) LVEF 20-25%, AV opens 1:1, no AR  Echo (5/29/18) LVEF 10-%, ramps study done, report in [facility]  Echo (1/9/18) ramp study done, LVEDD 7.1cm  LHC (11/9/18) 2 vessel disease with 90% OM, 80% PDA, DSA to PDA branch  RHC 4/20 showed adequate Sal CI 3.0 but severely elevated RA pressure 24 mmHg  CXR negative 4/27/21     INTERVAL HISTORY:  Ms. Gabriella Lux is [age] y.o.  Female s/p LVAD implant with HM 2 and ESRD requiring HD that was recently admitted for RV failure and worsening renal function.  She was discharged on 5/5/21 to [facility] for rehabilitation before transitioning home to live her with daughter. In the last few days she had worsening mental status changed, decreased UOP and respiratory distress after possible aspiration- she was intubated and admitted to CV for further management. It appears patient condition was triggered by candida UTI and likely intolerance of BIPAP - inadequately supporting ventilation in rehab - patient is dependent on Trilogy support. LVAD INTERROGATION:  Device interrogated in person  Device function normal, normal flow, no events  LVAD   Pump Speed (RPM): 9400  Pump Flow (LPM): 5  MAP: 100  PI (Pulsitility Index): 6.7  Power: 5.6   Test: No  Back Up  at Bedside & Labeled: Yes  Power Module Test: No  Driveline Site Care: No  Driveline Dressing: Clean, Dry, and Intact  Outpatient: No  MAP in Therapeutic Range (Outpatient): No  Testing  Alarms Reviewed: Yes  Back up SC speed: 9400  Back up Low Speed Limit: 9000  Emergency Equipment with Patient?: Yes  Emergency procedures reviewed?: No  Drive line site inspected?: No  Drive line intergrity inspected?: Yes  Drive line dressing changed?: No    PHYSICAL EXAM:  Visit Vitals  /80 Comment: treatment ended. Blood returned   Pulse 96   Temp 98.3 °F (36.8 °C)   Resp 18   Ht 5' 7\" (1.702 m)   Wt 230 lb 9.6 oz (104.6 kg)   SpO2 93%   BMI 36.12 kg/m²     General: Patient is well developed, well-nourished in no acute distress  HEENT: Normocephalic and atraumatic. No scleral icterus. Pupils are equal, round and reactive to light and accomodation. No conjunctival injection. Oropharynx is clear. Neck: Supple. No evidence of thyroid enlargements or lymphadenopathy. JVD: Cannot be appreciated   Lungs: Breath sounds are equal and clear bilaterally. No wheezes, rhonchi, or rales. Heart: Regular rate and rhythm with normal S1 and S2. No murmurs, gallops or rubs. Abdomen: Soft, no mass or tenderness.  No organomegaly or hernia. Bowel sounds present. Genitourinary and rectal: deferred  Extremities: No cyanosis, clubbing, or edema. Neurologic: No focal sensory or motor deficits are noted. Grossly intact. Psychiatric: Awake, alert an doriented x 3. Appropriate mood and affect. Skin: Warm, dry and well perfused. No lesions, nodules or rashes are noted. REVIEW OF SYSTEMS:  General: Denies fever, night sweats. Ear, nose and throat: Denies difficulty hearing, sinus problems, runny nose, post-nasal drip, ringing in ears, mouth sores, loose teeth, ear pain, nosebleeds, sore throate, facial pain or numbess  Cardiovascular: see above in the interval history  Respiratory: Denies cough, wheezing, sputum production, hemoptysis. Gastrointestinal: Denies heartburn, constipation, intolerance to certain foods, diarrhea, abdominal pain, nausea, vomiting, difficulty swallowing, blood in stool  Kidney and bladder: Denies painful urination, frequent urination, urgency, prostate problems and impotence  Musculoskeletal: Denies joint pain, muscle weakness  Skin and hair: Denies change in existing skin lesions, hair loss or increase, breast changes    PAST MEDICAL HISTORY:  Past Medical History:   Diagnosis Date    Asthma     Cancer ([facility]     breast    Cancer ([facility]     endometrial    Congestive heart failure, unspecified     CRI (chronic renal insufficiency)     Depression     Diabetes ([address]     Hypertension     Severe sepsis with acute organ dysfunction ([facility] 5/11/2021       PAST SURGICAL HISTORY:  Past Surgical History:   Procedure Laterality Date    HX HERNIA REPAIR      HX HYSTERECTOMY      HX MASTECTOMY      IR INSERT NON TUNL CVC OVER 5 YRS  4/26/2021    IR INSERT TUNL CVC W/O PORT OVER 5 YR  5/5/2021       FAMILY HISTORY:  No family history on file.     SOCIAL HISTORY:  Social History     Socioeconomic History    Marital status:      Spouse name: Not on file    Number of children: Not on file    Years of education: Not on file    Highest education level: Not on file   Tobacco Use    Smoking status: Never Smoker    Smokeless tobacco: Never Used   Substance and Sexual Activity    Alcohol use: Not Currently     Social Determinants of Health     Financial Resource Strain:     Difficulty of Paying Living Expenses:    Food Insecurity:     Worried About Running Out of Food in the Last Year:     Ran Out of Food in the Last Year:    Transportation Needs:     Lack of Transportation (Medical):  Lack of Transportation (Non-Medical):    Physical Activity:     Days of Exercise per Week:     Minutes of Exercise per Session:    Stress:     Feeling of Stress :    Social Connections:     Frequency of Communication with Friends and Family:     Frequency of Social Gatherings with Friends and Family:     Attends [employer] Services:     Active Member of Clubs or Organizations:     Attends Club or Organization Meetings:     Marital Status:        LABORATORY RESULTS:     Labs Latest Ref Rng & Units 5/24/2021 5/23/2021 5/22/2021 5/21/2021 5/20/2021 5/19/2021 5/18/2021   WBC 3.6 - 11.0 K/uL 4.6 4.2 4.2 4.1 4.8 3.5(L) 3.7   RBC 3.80 - 5.20 M/uL 3.22(L) 3.26(L) 2.97(L) 3.18(L) 3.35(L) 3.38(L) 3.29(L)   Hemoglobin 11.5 - 16.0 g/dL 8.7(L) 8. 9(L) 8. 1(L) 8.6(L) 9.0(L) 9.1(L) 9.0(L)   Hematocrit 35.0 - 47.0 % 29. 7(L) 29. 8(L) 27. 7(L) 29. 0(L) 30. 4(L) 31. 2(L) 30. 0(L)   MCV 80.0 - 99.0 FL 92.2 91.4 93.3 91.2 90.7 92.3 91.2   Platelets 872 - 579 K/uL 129(L) 139(L) 133(L) 152 154 143(L) 139(L)   Lymphocytes 12 - 49 % 11(L) 10(L) 11(L) 11(L) 11(L) 11(L) 5(L)   Monocytes 5 - 13 % 9 8 8 9 8 13 12   Eosinophils 0 - 7 % 0 0 0 0 0 0 0   Basophils 0 - 1 % 1 1 1 1 1 1 0   Bands 0 - 6 % - - - - - - -   Albumin 3.5 - 5.0 g/dL 3. 0(L) 3.0(L) 2. 9(L) 2. 9(L) 3.0(L) 3.0(L) 3. 2(L)   Calcium 8.5 - 10.1 MG/DL 8.9 8.8 9.0 8.8 9.0 8.8 8.7   Glucose 65 - 100 mg/dL 119(H) 112(H) 125(H) 142(H) 124(H) 116(H) 130(H)   BUN 6 - 20 MG/DL 22(H) 14 28(H) 20 35(H) 27(H) 51(H)   Creatinine 0.55 - 1.02 MG/DL 4.15(H) 3.14(H) 4.97(H) 3.45(H) 4.77(H) 3.86(H) 5.74(H)   Sodium 136 - 145 mmol/L 134(L) 136 135(L) 136 134(L) 139 135(L)   Potassium 3.5 - 5.1 mmol/L 3.6 3.8 3.9 3.7 4.0 3.9 4.0   TSH 0.36 - 3.74 uIU/mL - - - - - - -   LDH 81 - 246 U/L 194 210 192 214 216 186 199   Some recent data might be hidden     Lab Results   Component Value Date/Time    TSH 1.60 05/15/2021 03:14 AM    TSH 5.36 (H) 04/24/2021 04:32 AM    TSH 3.99 (H) 04/17/2021 04:54 AM    TSH 2.980 10/01/2020 12:00 AM       ALLERGY:  Allergies   Allergen Reactions    Benzodiazepines Other (comments)     Respiratory issues        CURRENT MEDICATIONS:    Current Facility-Administered Medications:     warfarin (COUMADIN) tablet 4 mg, 4 mg, Oral, ONCE, Rosemarie Ward MD    albumin human 5% (BUMINATE) solution 12.5 g, 12.5 g, IntraVENous, ONCE, Triny Limon B, NP    isosorbide mononitrate ER (IMDUR) tablet 30 mg, 30 mg, Oral, BID, Brown Pina MD, 30 mg at 05/24/21 0956    L.acidophilus-paracasei-S.thermophil-bifidobacter (RISAQUAD) 8 billion cell capsule, 1 Capsule, Oral, DAILY, TRE Ward MD, 1 Capsule at 05/24/21 0955    loperamide (IMODIUM) capsule 2 mg, 2 mg, Oral, Q4H PRN, ANDRE Ward MD    albuterol-ipratropium (DUO-NEB) 2.5 MG-0.5 MG/3 ML, 3 mL, Nebulization, BID RT, Brown Pina MD, 3 mL at 05/24/21 0713    hydrOXYzine HCL (ATARAX) tablet 25 mg, 25 mg, Oral, TID PRN, Thuy Sax, NP, 25 mg at 05/24/21 0436    simethicone (MYLICON) tablet 80 mg, 80 mg, Oral, QID PRN, MARIANA Ward MD    epoetin amalia-epbx (RETACRIT) injection 20,000 Units, 20,000 Units, SubCUTAneous, Q TUE, THU & SAT, Jessica ABBASI MD, 20,000 Units at 05/22/21 2038    artificial saliva (MOUTH KOTE) 1 Spray, 1 Spray, Oral, PRN, Braxton, Triny B, NP    cephALEXin (KEFLEX) capsule 250 mg, 250 mg, Oral, BID, Braxton, Triny B, NP, 250 mg at 05/24/21 0955    PARoxetine (PAXIL) tablet 20 mg, 20 mg, Oral, DAILY, Festus Ribeiro MD, 20 mg at 05/24/21 0956    hydrocortisone (CORTAID) 1 % cream, , Topical, PRN, Festus Ribeiro MD    glucose chewable tablet 16 g, 4 Tablet, Oral, PRN, Elda Pineda MD    dextrose (D50W) injection syrg 12.5-25 g, 25-50 mL, IntraVENous, PRN, Elda Pineda MD    glucagon [facility]) injection 1 mg, 1 mg, IntraMUSCular, PRN, Elda Pineda MD    insulin lispro (HUMALOG) injection, , SubCUTAneous, AC&HS, Doris Philippe MD, 2 Units at 05/24/21 0956    famotidine (PEPCID) tablet 20 mg, 20 mg, Oral, DAILY, Kimmie Burgos MD, 20 mg at 05/24/21 0955    benzocaine-zinc cl-benzalkonium cl (ORAJEL) 20-0.1-0.02 % mucosal gel, , Oral, PRN, Kimmie Burgos MD    magic mouthwash cpd (without sucralfate), 5 mL, Oral, DAILY PRN, Kimmie Burgos MD, 5 mL at 05/19/21 1507    balsam peru-castor oiL (VENELEX) ointment, , Topical, BID, Kimmie Burgos MD, Given at 05/24/21 0956    albumin human 25% (BUMINATE) solution 50 g, 50 g, IntraVENous, DIALYSIS PRN, Jaspreet Mendoza MD    heparin (porcine) 1,000 unit/mL injection 1,900 Units, 1,900 Units, InterCATHeter, DIALYSIS PRN, 1,900 Units at 05/22/21 1542 **AND** heparin (porcine) 1,000 unit/mL injection 1,900 Units, 1,900 Units, InterCATHeter, DIALYSIS PRN, Kianna Bonds MD, 1,900 Units at 05/22/21 1542    hydrALAZINE (APRESOLINE) 20 mg/mL injection 20 mg, 20 mg, IntraVENous, Q6H PRN, Triny Limon NP, 20 mg at 05/24/21 0010    hydrALAZINE (APRESOLINE) 20 mg/mL injection 10 mg, 10 mg, IntraVENous, Q6H PRN, Braxton, Triny B, NP, 10 mg at 05/22/21 2330    ranolazine ER (RANEXA) tablet 1,000 mg, 1,000 mg, Oral, BID, Braxton, Triny B, NP, 1,000 mg at 05/24/21 0955    hydrALAZINE (APRESOLINE) tablet 100 mg, 100 mg, Oral, TID, Braxton, Triny B, NP, 100 mg at 05/24/21 0956    tafamidis cap 61 mg (Patient Supplied), 61 mg, Oral, DAILY, Kimmie Burgos MD, 61 mg at 05/24/21 0955    ondansetron (Freddy Cowan) injection 4 mg, 4 mg, IntraVENous, Q4H PRN, Valeriy Morrow MD, 4 mg at 05/23/21 1216    zinc oxide-cod liver oil (DESITIN) 40 % paste, , Topical, Q12H, Joseph Grewal MD, Given at 05/24/21 0958    sodium chloride (NS) flush 5-10 mL, 5-10 mL, IntraVENous, PRN, Daniel Jean MD    sodium chloride (NS) flush 5-40 mL, 5-40 mL, IntraVENous, Q8H, Barb Urbina MD, 10 mL at 05/23/21 2129    sodium chloride (NS) flush 5-40 mL, 5-40 mL, IntraVENous, PRN, Barb Urbina MD    acetaminophen (TYLENOL) tablet 650 mg, 650 mg, Oral, Q6H PRN **OR** acetaminophen (TYLENOL) suppository 650 mg, 650 mg, Rectal, Q6H PRN, Barb Urbina MD    Warfarin - Pharmacy to Dose, , Other, Rx Dosing/Monitoring, Polliard, Romana Blazer, NP    sodium chloride (NS) flush 5-40 mL, 5-40 mL, IntraVENous, Q8H, Braxton, Triny B, NP, 40 mL at 05/24/21 0957    sodium chloride (NS) flush 5-40 mL, 5-40 mL, IntraVENous, PRN, Braxton, Triny B, NP, 10 mL at 05/23/21 1216    nystatin (MYCOSTATIN) 100,000 unit/gram powder, , Topical, PRN, Joseph Grewal MD    PATIENT CARE TEAM:  Patient Care Team:  Karo Roberts NP as PCP - General (Nurse Practitioner)  Tunde Cain MD (Cardiology)  Kim Parnell MD as Physician (Cardiology)  Loni Amaral LPN as Care Coordinator  Rashel Espinoza MD (Cardiothoracic Surgery)  Harpreet Wolf MD (Cardiology)     Thank you for allowing me to participate in this patient's care. Lalita Lopez NP  [facility] [address] [facility] [address], [address]  Phone: [phone]    [facility] ATTENDING ADDENDUM    Patient was seen and examined in person. Data and notes were reviewed. I have discussed and agree with the plan as noted in the NP note above without further additions.     Dianne Cesar MD PhD  [facility] [id number]

## 2021-05-24 NOTE — PROGRESS NOTES
ID Progress Note  2021    Subjective:     Somnolent today--no complaints, but falls asleep easily    Objective:     Antibiotics:  1. Vancomycin   2. Eraxis   3. Zosyn       Vitals:   Visit Vitals  /80 Comment: treatment ended. Blood returned   Pulse 98   Temp 97 °F (36.1 °C)   Resp 18   Ht 5' 7\" (1.702 m)   Wt 104.6 kg (230 lb 9.6 oz)   SpO2 92%   BMI 36.12 kg/m²        Tmax:  Temp (24hrs), Av.1 °F (36.7 °C), Min:97 °F (36.1 °C), Max:98.3 °F (36.8 °C)      Exam:  Lungs:  clear to auscultation bilaterally  Heart:  regular rate and rhythm  Abdomen:  soft, non-tender. Bowel sounds normal. No masses,  no organomegaly    Labs:      Recent Labs     21  0017 21  0332 21  0409   WBC 4.6 4.2 4.2   HGB 8.7* 8.9* 8.1*   * 139* 133*   BUN 22* 14 28*   CREA 4.15* 3.14* 4.97*   AP 52 54 53   TBILI 0.6 0.7 0.6       Cultures:     No results found for: SDES  Lab Results   Component Value Date/Time    Culture result: CANDIDA ALBICANS (A) 2021 11:29 AM    Culture result: RARE DIPHTHEROIDS (A) 2021 04:29 AM    Culture result: LIGHT YEAST, (APPARENT CANDIDA ALBICANS) (A) 2021 07:09 PM    Culture result: LIGHT NORMAL RESPIRATORY BETO 2021 07:09 PM       Radiology:     Line/Insert Date:           Assessment:     1. Chronic SWI  2. Candida UTI--can treat with fluconazole if able  3. LVAD infection     Objective:     1. Monitor off antibiotic therapy  2. Work up fevers  3.  Discussed     Kellie Souza MD

## 2021-05-24 NOTE — PROGRESS NOTES
Hospitalist Progress Note  Fadumo Massey MD  Answering service: [phone] OR [phone] from in house phone      Date of Service:  2021  NAME:  Judith Mart  :  1950  MRN:  [id number]    Admission Summary: This is a 82-year-old female who has advanced at heart failure with ventricular assist device support is admitted for dyspnea. She is found to have sepsis on further work-up. Interval history / Subjective: Follow-up for dyspnea/CHF/sepsis   Patient somnolent but arousable interactive. Daughter noted increased tremors today. ABG unremarkable, CO2 normal.  Electrolytes are optimal.  Patient has a dose of Atarax last night. Daughter stated that she does not tolerate Atarax and had similar reaction in the past, she is sure her somnolence and increased tremors is due to this. Assessment & Plan:      #CAD, advanced heart failure, chronic systolic and diastolic CHF: BiVentricular , s/p LVAD, s/p ICD. 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 Resolved. On room air  - extubated. doing well on her home trilogy ventilator at night. #ROCIO on CKD stage 4: progressive CKD and CRS- started HD   - Nephro following: R- IJ permacath in situ- OP HD set up at [place], [place]       #Sepsis likely source chronic surgical wound infection. Resolved. -Now back on home Keflex now. #Candida UTI: Continue Eraxis. ID help appreciated. #Gross tremors. Work-up in prior admissions unremarkable. Covid suggesting this could be due to amyloidosis.   Somnolent and increased tremor, per family consistent with previous reaction to Atarax. Atarax discontinued from ordered.         #. H/o breast cancer (1992) - s/p bilateral mastectomy/chemo   #. Lymphedema of LLE due to cancer treatment  #. COPD: no acute exacerbation. FEV1 50%  #. AFib: AC as above   #. Hyperkalemia, resolved  #. Pulmonary hypertension  #. Morbid obesity, BMI 42.29 kg/m². #. DM2: A1c 7.2, SSI, AccuChecks, monitor  #. JED on Trilogy  #. Urinary incontinence, severe  #. Endometrial cancer (2002)- s/p Hysterectomy  #. HTN: chronic, stable, Home regimen, PRN BP meds. Monitor  #. HLD: chronic, Stable, Home regimen  #Abdominal distention with gas pain: As needed simethicone. KUB with: Gaseous distention. -DC Colace  -Add probiotics and as needed Imodium. Discharge planning:  -Patient accepted to [facility] dialysis unit but available time only at 645 Monday Wednesday Friday, currently on a TTS schedule, patient and family session be difficult for them to arrange transportation. CM working on to set up for [facility] 2nd/3rd shift on TTS    Code status: Full  DVT prophylaxis: warfarin  Care Plan discussed with: Patient/Family and Nurse  Disposition: Home with home health PT OT,in 2-3 days . She will finish Eraxis on 5/23     Hospital Problems  Date Reviewed: 4/25/2021        Codes Class Noted POA    Severe sepsis with acute organ dysfunction [facility]) ICD-10-CM: A41.9, R65.20  ICD-9-CM: 038.9, 995.92 Acute 5/11/2021 Yes        * (Principal) Acute encephalopathy ICD-10-CM: G93.40  ICD-9-CM: 348.30  5/11/2021 Unknown            Review of Systems:   Pertinent items are mentioned in interval history. Vital Signs:    Last 24hrs VS reviewed since prior progress note.  Most recent are:  Visit Vitals  /80   Pulse 98   Temp 97 °F (36.1 °C)   Resp 18   Ht 5' 7\" (1.702 m)   Wt 104.6 kg (230 lb 9.6 oz)   SpO2 92%   BMI 36.12 kg/m²         Intake/Output Summary (Last 24 hours) at 5/24/2021 6321  Last data filed at 5/24/2021 1020  Gross per 24 hour   Intake 1150 ml   Output 0 ml   Net 1150 ml        Physical Examination:   Evaluated face to face and examined 05/24/21    General:   Somnolent but arousable and interactive. Card:  humming VAD sound. good peripheral perfusion  Resp:  No accessory muscle use, fair AE, no wheezes. no crepitations  Abd:   Distended, normoactive, nontender. Extremities:   No edema. No cyanosis or clubbing, edema present. . Venous insufficiency discoloration. Neuro:  Grossly normal, no focal neuro deficits, follows commands, speech wnl. Psych:  Good insight, not agitated. Data Review:    Review and/or order of clinical lab test  Review and/or order of tests in the radiology section of CPT  Review and/or order of tests in the medicine section of CPT  Labs:     Recent Labs     05/24/21 0017 05/23/21 0332   WBC 4.6 4.2   HGB 8.7* 8.9*   HCT 29.7* 29.8*   * 139*     Recent Labs     05/24/21 0017 05/23/21 0332 05/22/21  0409   * 136 135*   K 3.6 3.8 3.9   CL 99 101 100   CO2 27 27 25   BUN 22* 14 28*   CREA 4.15* 3.14* 4.97*   * 112* 125*   CA 8.9 8.8 9.0   MG 2.1  --  2.5*   PHOS 3.8  --  4.7     Recent Labs     05/24/21 0017 05/23/21 0332 05/22/21 0409   ALT 7* 6* 8*   AP 52 54 53   TBILI 0.6 0.7 0.6   TP 7.4 7.2 7.1   ALB 3.0* 3.0* 2.9*   GLOB 4.4* 4.2* 4.2*     Recent Labs     05/24/21 0017 05/23/21 0332 05/22/21  0409   INR 1.3* 1.3* 1.5*   PTP 13.5* 13.8* 15.6*      Recent Labs     05/22/21  0409   FERR 212      Lab Results   Component Value Date/Time    Folate 10.0 10/28/2020 03:56 AM      No results for input(s): PH, PCO2, PO2 in the last 72 hours. No results for input(s): CPK, CKNDX, TROIQ in the last 72 hours.     No lab exists for component: CPKMB  Lab Results   Component Value Date/Time    Cholesterol, total 129 04/16/2021 10:40 AM    HDL Cholesterol 33 04/16/2021 10:40 AM    LDL, calculated 74 04/16/2021 10:40 AM    Triglyceride 110 04/16/2021 10:40 AM CHOL/HDL Ratio 3.9 04/16/2021 10:40 AM     Lab Results   Component Value Date/Time    Glucose (POC) 120 (H) 05/24/2021 05:00 PM    Glucose (POC) 111 05/24/2021 11:42 AM    Glucose (POC) 143 (H) 05/24/2021 06:07 AM    Glucose (POC) 122 (H) 05/23/2021 09:26 PM    Glucose (POC) 127 (H) 05/23/2021 05:09 PM     Lab Results   Component Value Date/Time    Color DARK YELLOW 05/22/2021 05:30 AM    Appearance CLOUDY (A) 05/22/2021 05:30 AM    Specific gravity 1.023 05/22/2021 05:30 AM    Specific gravity 1.013 11/09/2020 12:05 AM    pH (UA) 5.0 05/22/2021 05:30 AM    Protein 100 (A) 05/22/2021 05:30 AM    Glucose Negative 05/22/2021 05:30 AM    Ketone Negative 05/22/2021 05:30 AM    Bilirubin Negative 04/18/2021 05:08 PM    Urobilinogen 0.2 05/22/2021 05:30 AM    Nitrites Positive (A) 05/22/2021 05:30 AM    Leukocyte Esterase SMALL (A) 05/22/2021 05:30 AM    Epithelial cells FEW 05/22/2021 05:30 AM    Bacteria 1+ (A) 05/22/2021 05:30 AM    WBC 0-4 05/22/2021 05:30 AM    RBC 0-5 05/22/2021 05:30 AM     Medications Reviewed:     Current Facility-Administered Medications   Medication Dose Route Frequency    isosorbide mononitrate ER (IMDUR) tablet 30 mg  30 mg Oral BID    L.acidophilus-paracasei-S.thermophil-bifidobacter (RISAQUAD) 8 billion cell capsule  1 Capsule Oral DAILY    loperamide (IMODIUM) capsule 2 mg  2 mg Oral Q4H PRN    albuterol-ipratropium (DUO-NEB) 2.5 MG-0.5 MG/3 ML  3 mL Nebulization BID RT    simethicone (MYLICON) tablet 80 mg  80 mg Oral QID PRN    epoetin amalia-epbx (RETACRIT) injection 20,000 Units  20,000 Units SubCUTAneous Q TUE, THU & SAT    artificial saliva (MOUTH KOTE) 1 Spray  1 Spray Oral PRN    cephALEXin (KEFLEX) capsule 250 mg  250 mg Oral BID    PARoxetine (PAXIL) tablet 20 mg  20 mg Oral DAILY    hydrocortisone (CORTAID) 1 % cream   Topical PRN    glucose chewable tablet 16 g  4 Tablet Oral PRN    dextrose (D50W) injection syrg 12.5-25 g  25-50 mL IntraVENous PRN    glucagon (GLUCAGEN) injection 1 mg  1 mg IntraMUSCular PRN    insulin lispro (HUMALOG) injection   SubCUTAneous AC&HS    famotidine (PEPCID) tablet 20 mg  20 mg Oral DAILY    benzocaine-zinc cl-benzalkonium cl (ORAJEL) 20-0.1-0.02 % mucosal gel   Oral PRN    magic mouthwash cpd (without sucralfate)  5 mL Oral DAILY PRN    balsam peru-castor oiL (VENELEX) ointment   Topical BID    albumin human 25% (BUMINATE) solution 50 g  50 g IntraVENous DIALYSIS PRN    heparin (porcine) 1,000 unit/mL injection 1,900 Units  1,900 Units InterCATHeter DIALYSIS PRN    And    heparin (porcine) 1,000 unit/mL injection 1,900 Units  1,900 Units InterCATHeter DIALYSIS PRN    hydrALAZINE (APRESOLINE) 20 mg/mL injection 20 mg  20 mg IntraVENous Q6H PRN    hydrALAZINE (APRESOLINE) 20 mg/mL injection 10 mg  10 mg IntraVENous Q6H PRN    ranolazine ER (RANEXA) tablet 1,000 mg  1,000 mg Oral BID    hydrALAZINE (APRESOLINE) tablet 100 mg  100 mg Oral TID    tafamidis cap 61 mg (Patient Supplied)  61 mg Oral DAILY    ondansetron (ZOFRAN) injection 4 mg  4 mg IntraVENous Q4H PRN    zinc oxide-cod liver oil (DESITIN) 40 % paste   Topical Q12H    sodium chloride (NS) flush 5-10 mL  5-10 mL IntraVENous PRN    sodium chloride (NS) flush 5-40 mL  5-40 mL IntraVENous Q8H    sodium chloride (NS) flush 5-40 mL  5-40 mL IntraVENous PRN    acetaminophen (TYLENOL) tablet 650 mg  650 mg Oral Q6H PRN    Or    acetaminophen (TYLENOL) suppository 650 mg  650 mg Rectal Q6H PRN    Warfarin - Pharmacy to Dose   Other Rx Dosing/Monitoring    sodium chloride (NS) flush 5-40 mL  5-40 mL IntraVENous Q8H    sodium chloride (NS) flush 5-40 mL  5-40 mL IntraVENous PRN    nystatin (MYCOSTATIN) 100,000 unit/gram powder   Topical PRN   ______________________________________________________________________  EXPECTED LENGTH OF STAY: 4d 19h  ACTUAL LENGTH OF STAY:          13               Araceli Kaiser MD

## 2021-05-24 NOTE — PROGRESS NOTES
Pharmacist Note  Warfarin Dosing  Consult provided for this 78 y. o.female to manage warfarin for LVAD    INR Goal: 1.8-2.5 (2/2 GIB)  Home regimen/ tablet size: 4mg every other day alternating with 3 mg QOD    Drugs that may increase INR: None  Drugs that may decrease INR: None  Other current anticoagulants/ drugs that may increase bleeding risk: none  Risk factors: Age > 65, dialysis  Daily INR ordered: YES    Recent Labs     05/24/21  0017 05/23/21  0332 05/22/21  0409   HGB 8.7* 8.9* 8.1*   INR 1.3* 1.3* 1.5*     Date               INR                  Dose  5/11  3.3  hold   5/12                1.9                    2 mg      5/13                 1.4                   4 mg     5/14                 1.4                   6 mg      5/15  1.9  4 mg  5/16  3.1  HOLD  5/17                 4.1                  HOLD     5/18                 4.2                  HOLD   5/19                 3.9                  HOLD   5/20  3.2  HOLD  5/21  2.9  HOLD  5/22  1.5  2.5 mg              5/23  1.3  4 mg  5/24  1.3   4 mg                                                                  Assessment/ Plan: Will order warfarin 4 mg x 1 today     Pharmacy will continue to monitor daily and adjust therapy as indicated. Please contact the pharmacist at  for outpatient recommendations if needed.

## 2021-05-24 NOTE — PROGRESS NOTES
Transitions of Care Plan:  RUR:  41%  Clinical Plan: Therapy   Consults: Nephrology; [facility]; Therapy  Baseline: independent with LVAD; ambulates with WC or RW  Disposition: home with home health and outpatient HD unit set up    CM spoke with patient's daughter, Dirk Camarillo, re disposition plan. Currently awaiting chair time at [address] [place] [address] for outpatient HD. Private duty caregiver list provided to Dirk Camarillo yesterday. Plan for home with home health and 24/7 support provided by daughter and caregiver coordination that daughter is facilitating for patient. CM spoke with Gina Bernal (Coordinator of Beatrizmary alice Ramirez [facility] Admissions) to escalate inquiry into chair time for dialysis at [address] [place] [address]. Ashley Hardy provided update that they are working on the arrangement now to switch patient to 2nd or 3rd shift at [address] [place] [address]. Will call CM back ASA with update. 1300 - Received update from [address] [place] [address] confirming patient's information for admission to [address] [place] [address] has been transferred to that outpatient clinic and currently under review for chair time. CM will continue to follow.     Santiago Gay, MPH  Care Manager [facility]  Available via [address] [place] [address] or

## 2021-05-24 NOTE — PROGRESS NOTES
1455hrs:  Patient's daughter told RN that the ABG was completed and RT was going to follow up with MD regarding recommendations. Patient resting in bed--continues to remove trilogy mask, fidgety, drowsy. MD notified. 1509hrs:  Conversation with Dr. Don Lujan will be by to see patient. 1519hrs:  Dr. Jesica Garrison at bedside. Goal SpO2 >90%. Patient increasingly tremorous and drowsy. Per daughter, tremors improve with HD. In conversation, daughter stated that patient does not tolerate atarax well-\"she can only handle a very very small dose. This explains why she is like she is right now. \"  Verbal order from Dr. Jesica Garrison to discontinue this medication.   If patient has itching, use ice packs and anti-itch cream.

## 2021-05-24 NOTE — PROGRESS NOTES
[facility]   [address] [facility], [address] [place] [address], [facility]  Phone: [phone]   [phone]       Nephrology Progress Note  Gio Smith     1950     [id number]  Date of Admission : 5/11/2021 05/24/21    CC: Follow up for ROCIO    Assessment and Plan   ROCIO on CKD:  - 2/2 progressive CKD and CRS  - dialysis initiated 4/26/21. Has RIJ permacath for access. - HD TTS for now  - working on outpt unit placement at [facility]    Acute on chronic Hypoxic Resp failure :  - baseline severe COPD + chronic CHF + morbid obesity/ JED   - Pulm HTN   - extubated 5/13     Chronic systolic CHF, stage C NYHA class IV  Combined ischemic and nonischemic cardiomyopathy  S/p HM 2 LVAD implant 4/15/2017 by Dr. Cici Carbajal at [facility]  hx of recurrent VT : off mexiletine  chronic RV failure. 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 - per AHF team      Chronic anemia :   ATTR amyloidosis  - anemia of chronic disease   - continue HEIKE  MWF     Chronic A. Fib. History of endometrial Ca       Interval History:  Seen and examined. Per daughter, having confusion post dialysis. Feeling ok this AM. Awaiting dialysis unit placement. No cp, sob, n/v/d reported per patient. Review of Systems: Pertinent items are noted in HPI.     Current Medications:   Current Facility-Administered Medications   Medication Dose Route Frequency    warfarin (COUMADIN) tablet 4 mg  4 mg Oral ONCE    isosorbide mononitrate ER (IMDUR) tablet 30 mg  30 mg Oral BID    L.acidophilus-paracasei-S.thermophil-bifidobacter (RISAQUAD) 8 billion cell capsule  1 Capsule Oral DAILY    loperamide (IMODIUM) capsule 2 mg  2 mg Oral Q4H PRN    albuterol-ipratropium (DUO-NEB) 2.5 MG-0.5 MG/3 ML  3 mL Nebulization BID RT    hydrOXYzine HCL (ATARAX) tablet 25 mg  25 mg Oral TID PRN    simethicone (MYLICON) tablet 80 mg  80 mg Oral QID PRN    epoetin amalia-epbx (RETACRIT) injection 20,000 Units  20,000 Units SubCUTAneous Q TUE, THU & SAT    artificial saliva (MOUTH KOTE) 1 Spray  1 Spray Oral PRN    cephALEXin (KEFLEX) capsule 250 mg  250 mg Oral BID    PARoxetine (PAXIL) tablet 20 mg  20 mg Oral DAILY    hydrocortisone (CORTAID) 1 % cream   Topical PRN    glucose chewable tablet 16 g  4 Tablet Oral PRN    dextrose (D50W) injection syrg 12.5-25 g  25-50 mL IntraVENous PRN    glucagon (GLUCAGEN) injection 1 mg  1 mg IntraMUSCular PRN    insulin lispro (HUMALOG) injection   SubCUTAneous AC&HS    famotidine (PEPCID) tablet 20 mg  20 mg Oral DAILY    benzocaine-zinc cl-benzalkonium cl (ORAJEL) 20-0.1-0.02 % mucosal gel   Oral PRN    magic mouthwash cpd (without sucralfate)  5 mL Oral DAILY PRN    balsam peru-castor oiL (VENELEX) ointment   Topical BID    albumin human 25% (BUMINATE) solution 50 g  50 g IntraVENous DIALYSIS PRN    heparin (porcine) 1,000 unit/mL injection 1,900 Units  1,900 Units InterCATHeter DIALYSIS PRN    And    heparin (porcine) 1,000 unit/mL injection 1,900 Units  1,900 Units InterCATHeter DIALYSIS PRN    hydrALAZINE (APRESOLINE) 20 mg/mL injection 20 mg  20 mg IntraVENous Q6H PRN    hydrALAZINE (APRESOLINE) 20 mg/mL injection 10 mg  10 mg IntraVENous Q6H PRN    ranolazine ER (RANEXA) tablet 1,000 mg  1,000 mg Oral BID    hydrALAZINE (APRESOLINE) tablet 100 mg  100 mg Oral TID    tafamidis cap 61 mg (Patient Supplied)  61 mg Oral DAILY    ondansetron (ZOFRAN) injection 4 mg  4 mg IntraVENous Q4H PRN    zinc oxide-cod liver oil (DESITIN) 40 % paste   Topical Q12H    sodium chloride (NS) flush 5-10 mL  5-10 mL IntraVENous PRN    sodium chloride (NS) flush 5-40 mL  5-40 mL IntraVENous Q8H    sodium chloride (NS) flush 5-40 mL  5-40 mL IntraVENous PRN    acetaminophen (TYLENOL) tablet 650 mg  650 mg Oral Q6H PRN    Or    acetaminophen (TYLENOL) suppository 650 mg  650 mg Rectal Q6H PRN    Warfarin - Pharmacy to Dose   Other Rx Dosing/Monitoring    sodium chloride (NS) flush 5-40 mL  5-40 mL IntraVENous Q8H    sodium chloride (NS) flush 5-40 mL  5-40 mL IntraVENous PRN    nystatin (MYCOSTATIN) 100,000 unit/gram powder   Topical PRN      Allergies   Allergen Reactions    Benzodiazepines Other (comments)     Respiratory issues       Objective:  Vitals:    Vitals:    05/24/21 0019 05/24/21 0444 05/24/21 0713 05/24/21 0749   Pulse: 93 99  97   Resp: 20 22 22   Temp: 98.3 °F (36.8 °C) 98.3 °F (36.8 °C)  98.3 °F (36.8 °C)   TempSrc:       SpO2: 93% 91% 93% 93%   Weight:  105.5 kg (232 lb 9.4 oz)     Height:         Intake and Output:  No intake/output data recorded. 05/22 1901 - 05/24 0700  In: 1080 [P.O.:820; I.V.:260]  Out: 0     Physical Examination:    General: Obese   HEENT            NAD, pleasant  Neck:  permacath +  Resp:  Lungs clear B/L, no wheezing   CV:  RRR,  S1,S2  GI:  Soft, NT, + BS  Neurologic:  AAO X 3   Psych:             Normal affect and mood       []    High complexity decision making was performed  []    Patient is at high-risk of decompensation with multiple organ involvement    Lab Data Personally Reviewed: I have reviewed all the pertinent labs, microbiology data and radiology studies during assessment.     Recent Labs     05/24/21  0017 05/23/21  0332 05/22/21  0409   * 136 135*   K 3.6 3.8 3.9   CL 99 101 100   CO2 27 27 25   * 112* 125*   BUN 22* 14 28*   CREA 4.15* 3.14* 4.97*   CA 8.9 8.8 9.0   MG 2.1  --  2.5*   PHOS 3.8  --  4.7   ALB 3.0* 3.0* 2.9*   ALT 7* 6* 8*   INR 1.3* 1.3* 1.5*     Recent Labs     05/24/21  0017 05/23/21  0332 05/22/21  0409   WBC 4.6 4.2 4.2   HGB 8.7* 8.9* 8.1*   HCT 29.7* 29.8* 27.7*   * 139* 133*     No results found for: SDES  Lab Results   Component Value Date/Time    Culture result: CANDIDA ALBICANS (A) 05/13/2021 11:29 AM    Culture result: RARE DIPHTHEROIDS (A) 05/12/2021 04:29 AM    Culture result: LIGHT YEAST, (APPARENT CANDIDA ALBICANS) (A) 05/11/2021 07:09 PM    Culture result: LIGHT NORMAL RESPIRATORY BETO 05/11/2021 07:09 PM    Culture result: NO GROWTH 5 DAYS 05/11/2021 04:30 PM     Recent Results (from the past 24 hour(s))   GLUCOSE, POC    Collection Time: 05/23/21 11:28 AM   Result Value Ref Range    Glucose (POC) 168 (H) 65 - 117 mg/dL    Performed by Vesta HAJI    GLUCOSE, POC    Collection Time: 05/23/21 12:06 PM   Result Value Ref Range    Glucose (POC) 141 (H) 65 - 117 mg/dL    Performed by Preston Rivera    GLUCOSE, POC    Collection Time: 05/23/21  5:09 PM   Result Value Ref Range    Glucose (POC) 127 (H) 65 - 117 mg/dL    Performed by Preston Rivera    GLUCOSE, POC    Collection Time: 05/23/21  9:26 PM   Result Value Ref Range    Glucose (POC) 122 (H) 65 - 117 mg/dL    Performed by Sayra Maciel    MAGNESIUM    Collection Time: 05/24/21 12:17 AM   Result Value Ref Range    Magnesium 2.1 1.6 - 2.4 mg/dL   PHOSPHORUS    Collection Time: 05/24/21 12:17 AM   Result Value Ref Range    Phosphorus 3.8 2.6 - 4.7 MG/DL   PROTHROMBIN TIME + INR    Collection Time: 05/24/21 12:17 AM   Result Value Ref Range    INR 1.3 (H) 0.9 - 1.1      Prothrombin time 13.5 (H) 9.0 - 11.1 sec   PROCALCITONIN    Collection Time: 05/24/21 12:17 AM   Result Value Ref Range    Procalcitonin 0.55 ng/mL   LD    Collection Time: 05/24/21 12:17 AM   Result Value Ref Range     81 - 215 U/L   METABOLIC PANEL, COMPREHENSIVE    Collection Time: 05/24/21 12:17 AM   Result Value Ref Range    Sodium 134 (L) 136 - 145 mmol/L    Potassium 3.6 3.5 - 5.1 mmol/L    Chloride 99 97 - 108 mmol/L    CO2 27 21 - 32 mmol/L    Anion gap 8 5 - 15 mmol/L    Glucose 119 (H) 65 - 100 mg/dL    BUN 22 (H) 6 - 20 MG/DL    Creatinine 4.15 (H) 0.55 - 1.02 MG/DL    BUN/Creatinine ratio 5 (L) 12 - 20      GFR est AA 13 (L) >60 ml/min/1.73m2    GFR est non-AA 11 (L) >60 ml/min/1.73m2    Calcium 8.9 8.5 - 10.1 MG/DL    Bilirubin, total 0.6 0.2 - 1.0 MG/DL    ALT (SGPT) 7 (L) 12 - 78 U/L    AST (SGOT) 17 15 - 37 U/L    Alk.  phosphatase 52 45 - 117 U/L Protein, total 7.4 6.4 - 8.2 g/dL    Albumin 3.0 (L) 3.5 - 5.0 g/dL    Globulin 4.4 (H) 2.0 - 4.0 g/dL    A-G Ratio 0.7 (L) 1.1 - 2.2     CBC WITH AUTOMATED DIFF    Collection Time: 05/24/21 12:17 AM   Result Value Ref Range    WBC 4.6 3.6 - 11.0 K/uL    RBC 3.22 (L) 3.80 - 5.20 M/uL    HGB 8.7 (L) 11.5 - 16.0 g/dL    HCT 29.7 (L) 35.0 - 47.0 %    MCV 92.2 80.0 - 99.0 FL    MCH 27.0 26.0 - 34.0 PG    MCHC 29.3 (L) 30.0 - 36.5 g/dL    RDW 19.5 (H) 11.5 - 14.5 %    PLATELET 445 (L) 818 - 400 K/uL    MPV 8.8 (L) 8.9 - 12.9 FL    NRBC 0.0 0  WBC    ABSOLUTE NRBC 0.00 0.00 - 0.01 K/uL    NEUTROPHILS 78 (H) 32 - 75 %    LYMPHOCYTES 11 (L) 12 - 49 %    MONOCYTES 9 5 - 13 %    EOSINOPHILS 0 0 - 7 %    BASOPHILS 1 0 - 1 %    IMMATURE GRANULOCYTES 1 (H) 0.0 - 0.5 %    ABS. NEUTROPHILS 3.7 1.8 - 8.0 K/UL    ABS. LYMPHOCYTES 0.5 (L) 0.8 - 3.5 K/UL    ABS. MONOCYTES 0.4 0.0 - 1.0 K/UL    ABS. EOSINOPHILS 0.0 0.0 - 0.4 K/UL    ABS. BASOPHILS 0.0 0.0 - 0.1 K/UL    ABS. IMM. GRANS. 0.0 0.00 - 0.04 K/UL    DF SMEAR SCANNED      RBC COMMENTS ANISOCYTOSIS  1+       GLUCOSE, POC    Collection Time: 05/24/21  6:07 AM   Result Value Ref Range    Glucose (POC) 143 (H) 65 - 117 mg/dL    Performed by Daniele Dumont            Total time spent with patient:  xxx   min. Care Plan discussed with:  Patient     Family      RN      Consulting Physician [address] [place] [address],         I have reviewed the flowsheets. Chart and Pertinent Notes have been reviewed. No change in PMH ,family and social history from Consult note.       Augie Villegas MD

## 2021-05-24 NOTE — PROGRESS NOTES
[facility] VAD/Heart Failure Progress Note    Admit Date: 2021  POD:  * No surgery found *    Procedure:          Subjective:   Feels good this am; off oxygen; good flows     Objective:   Vitals:  Blood pressure 100/80, pulse 97, temperature 98.3 °F (36.8 °C), resp. rate 22, height 5' 7\" (1.702 m), weight 232 lb 9.4 oz (105.5 kg), SpO2 93 %. Temp (24hrs), Av.3 °F (36.8 °C), Min:98.3 °F (36.8 °C), Max:98.4 °F (36.9 °C)    Hemodynamics:   CO:     CI:     CVP: CVP (mmHg): 10 mmHg (21 0700)   SVR:     PAP Systolic:     PAP Diastolic:     PVR:     MC55:     SCV02:      VAD Interrogation: LVAD (Heartmate)  Pump Speed (RPM): 9400  Pump Flow (LPM): 5.3  Chatter in Lines: No  PI (Pulsitility Index): 5.9  Power: 5.9  MAP: 88   Test: No  Back Up  at Bedside & Labeled: Yes  Power Module Test: No  Driveline Site Care: Yes (new anchor placed)  Driveline Dressing: Clean, Dry, and Intact    EKG/Rhythm:      Extubation Date / Time:     CT Output:     Ventilator:  Ventilator Volumes  Vt Set (ml): 450 ml (21 0750)  Vt Exhaled (Machine Breath) (ml): 473 ml (21 0750)  Vt Spont (ml): 520 ml (21 0235)  Ve Observed (l/min): 10.1 l/min (21 0235)    Oxygen Therapy:  Oxygen Therapy  O2 Sat (%): 93 % (21 0749)  Pulse via Oximetry: 98 beats per minute (21 0713)  O2 Device: None (Room air) (21 0713)  Skin Assessment: Clean, dry, & intact (21 920)  Skin Protection for O2 Device: No (21)  O2 Flow Rate (L/min): 0 l/min (21)  O2 Temperature:  (HME) (21 0328)  FIO2 (%): 21 % (21 2152)  ETCO2 (mmHg): 36 mmHg (21 0200)    CXR:    Admission Weight: Last Weight   Weight: 269 lb 10 oz (122.3 kg) Weight: 232 lb 9.4 oz (105.5 kg) (patient unable to tolerate standing scale at this time)     Intake / Output / Drain:  Current Shift: No intake/output data recorded.   Last 24 hrs.:     Intake/Output Summary (Last 24 hours) at 5/24/2021 0932  Last data filed at 5/24/2021 0019  Gross per 24 hour   Intake 950 ml   Output 0 ml   Net 950 ml           No results for input(s): CPK, CKMB, TROIQ in the last 72 hours.   Recent Labs     05/24/21  0017 05/23/21  0332 05/22/21  0409   * 136 135*   K 3.6 3.8 3.9   CO2 27 27 25   BUN 22* 14 28*   CREA 4.15* 3.14* 4.97*   * 112* 125*   PHOS 3.8  --  4.7   MG 2.1  --  2.5*   WBC 4.6 4.2 4.2   HGB 8.7* 8.9* 8.1*   HCT 29.7* 29.8* 27.7*   * 139* 133*     Recent Labs     05/24/21 0017 05/23/21  0332 05/22/21  0409   INR 1.3* 1.3* 1.5*   PTP 13.5* 13.8* 15.6*     No lab exists for component: PBNP        Current Facility-Administered Medications:     warfarin (COUMADIN) tablet 4 mg, 4 mg, Oral, ONCE, Rosemarie Ward MD    isosorbide mononitrate ER (IMDUR) tablet 30 mg, 30 mg, Oral, BID, Denise Lei MD, 30 mg at 05/23/21 1709    L.acidophilus-paracasei-S.thermophil-bifidobacter (RISAQUAD) 8 billion cell capsule, 1 Capsule, Oral, DAILY, KEL Ward MD, 1 Capsule at 05/23/21 0947    loperamide (IMODIUM) capsule 2 mg, 2 mg, Oral, Q4H PRN, LIANA Ward MD    albuterol-ipratropium (DUO-NEB) 2.5 MG-0.5 MG/3 ML, 3 mL, Nebulization, BID RT, Denise Lei MD, 3 mL at 05/24/21 4670    hydrOXYzine HCL (ATARAX) tablet 25 mg, 25 mg, Oral, TID PRN, Andrzej Macedo NP, 25 mg at 05/24/21 0436    simethicone (MYLICON) tablet 80 mg, 80 mg, Oral, QID PRN, GLORIA Ward MD    epoetin amalia-epbx (RETACRIT) injection 20,000 Units, 20,000 Units, SubCUTAneous, Q TUE, THU & SAT, Stacey Garner MD, 20,000 Units at 05/22/21 2038    artificial saliva (MOUTH KOTE) 1 Spray, 1 Spray, Oral, PRN, Braxton, Triny B, NP    cephALEXin (KEFLEX) capsule 250 mg, 250 mg, Oral, BID, Braxton, Triny B, NP, 250 mg at 05/23/21 2127    PARoxetine (PAXIL) tablet 20 mg, 20 mg, Oral, DAILY, AlmsalFestus boyd MD, 20 mg at 05/23/21 0946    hydrocortisone (CORTAID) 1 % cream, , Topical, PRN, AlmsallamFestus MD    glucose chewable tablet 16 g, 4 Tablet, Oral, PRN, Elda Pineda MD    dextrose (D50W) injection syrg 12.5-25 g, 25-50 mL, IntraVENous, PRN, Elda Pineda MD    glucagon [facility]) injection 1 mg, 1 mg, IntraMUSCular, PRN, Elda Pineda MD    insulin lispro (HUMALOG) injection, , SubCUTAneous, AC&HS, Amber Thorpe MD, 2 Units at 05/23/21 1223    famotidine (PEPCID) tablet 20 mg, 20 mg, Oral, DAILY, Toribio Goodpasture, MD, 20 mg at 05/23/21 0946    benzocaine-zinc cl-benzalkonium cl (ORAJEL) 20-0.1-0.02 % mucosal gel, , Oral, PRN, Toribio Goodpasture, MD    magic mouthwash cpd (without sucralfate), 5 mL, Oral, DAILY PRN, Toribio Goodpasture, MD, 5 mL at 05/19/21 1507    balsam peru-castor oiL (VENELEX) ointment, , Topical, BID, Toribio Goodpasture, MD, Given at 05/23/21 1709    albumin human 25% (BUMINATE) solution 50 g, 50 g, IntraVENous, DIALYSIS PRN, Dea Win MD    heparin (porcine) 1,000 unit/mL injection 1,900 Units, 1,900 Units, InterCATHeter, DIALYSIS PRN, 1,900 Units at 05/22/21 1542 **AND** heparin (porcine) 1,000 unit/mL injection 1,900 Units, 1,900 Units, InterCATHeter, DIALYSIS PRN, Dea Win MD, 1,900 Units at 05/22/21 1542    hydrALAZINE (APRESOLINE) 20 mg/mL injection 20 mg, 20 mg, IntraVENous, Q6H PRN, Braxton, Triny B, NP, 20 mg at 05/24/21 0010    hydrALAZINE (APRESOLINE) 20 mg/mL injection 10 mg, 10 mg, IntraVENous, Q6H PRN, Braxton, Triny B, NP, 10 mg at 05/22/21 2330    ranolazine ER (RANEXA) tablet 1,000 mg, 1,000 mg, Oral, BID, Braxton, Triny B, NP, 1,000 mg at 05/23/21 2135    hydrALAZINE (APRESOLINE) tablet 100 mg, 100 mg, Oral, TID, Braxton, Triny B, NP, 100 mg at 05/23/21 2127    tafamidis cap 61 mg (Patient Supplied), 61 mg, Oral, DAILY, Toribio Goodpasture, MD, 61 mg at 05/23/21 0946    ondansetron (ZOFRAN) injection 4 mg, 4 mg, IntraVENous, Q4H PRN, Valeriy Morrow MD, 4 mg at 05/23/21 1216    zinc oxide-cod liver oil (DESITIN) 40 % paste, , Topical, Q12H, Chas Markham MD, Given at 05/23/21 2100    sodium chloride (NS) flush 5-10 mL, 5-10 mL, IntraVENous, PRN, Araceli Taylor MD    sodium chloride (NS) flush 5-40 mL, 5-40 mL, IntraVENous, Q8H, Anny Esparza MD, 10 mL at 05/23/21 2129    sodium chloride (NS) flush 5-40 mL, 5-40 mL, IntraVENous, PRN, Anny Esparza MD    acetaminophen (TYLENOL) tablet 650 mg, 650 mg, Oral, Q6H PRN **OR** acetaminophen (TYLENOL) suppository 650 mg, 650 mg, Rectal, Q6H PRN, Anny Esparza MD    Warfarin - Pharmacy to Dose, , Other, Rx Dosing/Monitoring, Ynes Walter NP    sodium chloride (NS) flush 5-40 mL, 5-40 mL, IntraVENous, Q8H, Braxton, Triny B, NP, 10 mL at 05/23/21 2128    sodium chloride (NS) flush 5-40 mL, 5-40 mL, IntraVENous, PRN, Braxton, Triny B, NP, 10 mL at 05/23/21 1216    nystatin (MYCOSTATIN) 100,000 unit/gram powder, , Topical, PRN, Chas Markham MD    A/P  S/P LVAD - good flows  Renal failure - HD  Urosepsis - Abx's  Need for anticoagulation - coumadin      Risk of morbidity and mortality - high  Medical decision making - high complexity    Signed By: Kalli Alberto MD

## 2021-05-24 NOTE — PROGRESS NOTES
Problem: Mobility Impaired (Adult and Pediatric)  Goal: *Acute Goals and Plan of Care (Insert Text)  Description: FUNCTIONAL STATUS PRIOR TO ADMISSION: Patient was modified independent using a rollator for functional mobility. HOME SUPPORT PRIOR TO ADMISSION: The patient lived alone with her daughter to provide assistance as needed. Recent prolonged admission to [facility], discharged to rehab then readmitted from rehab. Physical Therapy Goals  Initiated 5/16/2021; goals remain appropriate 5/24/21  1. Patient will move from supine to sit and sit to supine  and scoot up and down in bed with minimal assistance/contact guard assist within 7 day(s). 2.  Patient will transfer from bed to chair and chair to bed with supervision/set-up using the least restrictive device within 7 day(s). 3.  Patient will perform sit to stand with supervision/set-up within 7 day(s). 4.  Patient will ambulate with supervision/set-up for 50 feet with the least restrictive device within 7 day(s). 5.  Patient will ascend/descend 1 stairs without handrail(s) with supervision/set-up within 7 day(s). Outcome: Progressing Towards Goal   PHYSICAL THERAPY TREATMENT: WEEKLY REASSESSMENT  Patient: Patrica Mcneil (12 y.o. female)  Date: 5/24/2021  Primary Diagnosis: Acute encephalopathy [G93.40]        Precautions:   Fall (prior LVAD)      ASSESSMENT  Patient continues with skilled PT services and is progressing towards goals. Patient received in chair reclined and presenting with UE tremors and sleeping. RN aware. Possible plan for patient to d/c to daughters tomorrow. Patient required 2 attempts to stand and able to awaken for transfer yet required moderate cues. Patient transferred back to bed and returned to supine. Deferred further mobility at this time 2/2 fatigue and will continue to follow to progress functional mobility.       Patient's progression toward goals since last assessment: min A x2 for transfers    Current Level of Function Impacting Discharge (mobility/balance): mod A sit to supine    Functional Outcome Measure: The patient scored 2/28 on the Tinetti outcome measure which is indicative of patient is a high fall risk. Other factors to consider for discharge: fall risk, supportive daughter, LVAD, w/c accessible home         PLAN :  Goals have been updated based on progression since last assessment. Patient continues to benefit from skilled intervention to address the above impairments. Recommendations and Planned Interventions: bed mobility training, transfer training, gait training, therapeutic exercises, neuromuscular re-education, patient and family training/education, and therapeutic activities      Frequency/Duration: Patient will be followed by physical therapy:  5 times a week to address goals. Recommendation for discharge: (in order for the patient to meet his/her long term goals)  Physical therapy at least 2 days/week in the home AND ensure assist and/or supervision for safety with transfers and ambulation    This discharge recommendation:  Has been made in collaboration with the attending provider and/or case management    IF patient discharges home will need the following DME: patient owns DME required for discharge         SUBJECTIVE:   Patient stated I am thinking.     OBJECTIVE DATA SUMMARY:   HISTORY:    Past Medical History:   Diagnosis Date    Asthma     Cancer ([facility] [address]     breast    Cancer ([facility] [address]     endometrial    Congestive heart failure, unspecified     CRI (chronic renal insufficiency)     Depression     Diabetes ([facility] [address]     Hypertension     Severe sepsis with acute organ dysfunction ([address] 5/11/2021     Past Surgical History:   Procedure Laterality Date    HX HERNIA REPAIR      HX HYSTERECTOMY      HX MASTECTOMY      IR INSERT NON TUNL CVC OVER 5 YRS  4/26/2021    IR INSERT TUNL CVC W/O PORT OVER 5 YR  5/5/2021       Personal factors and/or comorbidities impacting plan of care: [address] [place] [address] Dr WALKER Situation  Home Environment: Private residence  # Steps to Enter: 1  One/Two Story Residence: One story  Living Alone: Yes  Support Systems: Family member(s) (daughter)  Patient Expects to be Discharged to[de-identified] Private residence  Current DME Used/Available at Home: Selestino Godfrey, rollator, Wheelchair, Commode, bedside, Hospital bed  Tub or Shower Type: Shower (however patient sponge bathes)    EXAMINATION/PRESENTATION/DECISION MAKING:   Critical Behavior:  Neurologic State: Alert  Orientation Level: Disoriented to situation, Disoriented to person, Disoriented to place (intermittant confusion and hallucinations)  Cognition: Follows commands  Safety/Judgement: Decreased insight into deficits, Decreased awareness of need for safety, Decreased awareness of need for assistance  Hearing: Auditory  Auditory Impairment: None  Skin:  intact  Edema: none noted  Range Of Motion:  AROM: Generally decreased, functional           PROM: Generally decreased, functional           Strength:    Strength: Generally decreased, functional                    Tone & Sensation:                                  Coordination:     Vision:      Functional Mobility:  Bed Mobility:        Sit to Supine: Moderate assistance     Transfers:  Sit to Stand: Minimum assistance; Moderate assistance;Assist x2  Stand to Sit: Minimum assistance; Adaptive equipment; Additional time;Assist x2  Stand Pivot Transfers: Minimum assistance; Adaptive equipment; Additional time;Assist x2                    Balance:   Sitting: Impaired; Without support  Sitting - Static: Good (unsupported)  Sitting - Dynamic: Fair (occasional)  Standing: Impaired; With support  Standing - Static: Fair  Standing - Dynamic : Fair;Constant support  Functional Measure:  Tinetti test:    Sitting Balance: 1  Arises: 0  Attempts to Rise: 0  Immediate Standing Balance: 0  Standing Balance: 0  Nudged: 0  Eyes Closed: 0  Turn 360 Degrees - Continuous/Discontinuous: 0  Turn 360 Degrees - Steady/Unsteady: 0  Sitting Down: 1  Balance Score: 2 Balance total score  Indication of Gait: 0  R Step Length/Height: 0  L Step Length/Height: 0  R Foot Clearance: 0  L Foot Clearance: 0  Step Symmetry: 0  Step Continuity: 0  Path: 0  Trunk: 0  Walking Time: 0  Gait Score: 0 Gait total score  Total Score: 2/28 Overall total score         Tinetti Tool Score Risk of Falls  <19 = High Fall Risk  19-24 = Moderate Fall Risk  25-28 = Low Fall Risk  Tinetti ME. Performance-Oriented Assessment of Mobility Problems in Elderly Patients. [address]; [id number]. (Scoring Description: PT Bulletin Feb. 10, 1993)    Older adults: Everett Fu et al, 2009; n = 1000 [facility] elderly evaluated with ABC, FELIZ, ADL, and IADL)  · Mean FELIZ score for males aged 69-68 years = 26.21(3.40)  · Mean FELIZ score for females age 69-68 years = 25.16(4.30)  · Mean FELIZ score for males over 80 years = 23.29(6.02)  · Mean FELIZ score for females over 80 years = 17.20(8.32)         Activity Tolerance:   Fair and requires rest breaks    After treatment patient left in no apparent distress:   Supine in bed, Call bell within reach, Caregiver / family present, and Side rails x 3    COMMUNICATION/EDUCATION:   The patients plan of care was discussed with: Registered nurse. Fall prevention education was provided and the patient/caregiver indicated understanding., Patient/family have participated as able in goal setting and plan of care. , and Patient/family agree to work toward stated goals and plan of care.     Thank you for this referral.  Monroe Dumont, PT, DPT   Time Calculation: 15 mins

## 2021-05-24 NOTE — PROGRESS NOTES
Chart reviewed and attempted to see patient for OT weekly reassessment. RN reported that they had to take ABGs and that patient is exhausted at this time. Will follow up for OT intervention as able. Thank you.

## 2021-05-24 NOTE — PROGRESS NOTES
1930: Bedside and Verbal shift change report given to [address] (oncoming nurse) by Juan Lees RN (offgoing nurse). Report included the following information SBAR, Kardex, Intake/Output, Recent Results and Med Rec Status. 0730: Bedside and Verbal shift change report given to ** RN (oncoming nurse) by Edith Jimenez RN (offgoing nurse). Report included the following information SBAR, Kardex, Intake/Output, Recent Results and Med Rec Status.

## 2021-05-25 NOTE — PROGRESS NOTES
Problem: Pressure Injury - Risk of  Goal: *Prevention of pressure injury  Description: Document Jaime Scale and appropriate interventions in the flowsheet. Outcome: Progressing Towards Goal  Note: Pressure Injury Interventions:  Sensory Interventions: Assess changes in LOC, Assess need for specialty bed    Moisture Interventions: Absorbent underpads, Apply protective barrier, creams and emollients    Activity Interventions: Increase time out of bed, Pressure redistribution bed/mattress(bed type)    Mobility Interventions: HOB 30 degrees or less, Pressure redistribution bed/mattress (bed type)    Nutrition Interventions: Document food/fluid/supplement intake, Discuss nutritional consult with provider, Offer support with meals,snacks and hydration    Friction and Shear Interventions: HOB 30 degrees or less                Problem: Falls - Risk of  Goal: *Absence of Falls  Description: Document Godfrey Fall Risk and appropriate interventions in the flowsheet.   Outcome: Progressing Towards Goal  Note: Fall Risk Interventions:  Mobility Interventions: Bed/chair exit alarm, Patient to call before getting OOB    Mentation Interventions: Bed/chair exit alarm, Adequate sleep, hydration, pain control, Door open when patient unattended, Family/sitter at bedside    Medication Interventions: Teach patient to arise slowly, Utilize gait belt for transfers/ambulation, Patient to call before getting OOB    Elimination Interventions: Call light in reach, Patient to call for help with toileting needs    History of Falls Interventions: Consult care management for discharge planning, Bed/chair exit alarm, Door open when patient unattended, Evaluate medications/consider consulting pharmacy, Investigate reason for fall, Room close to nurse's station, Utilize gait belt for transfer/ambulation, Assess for delayed presentation/identification of injury for 48 hrs (comment for end date), Vital signs minimum Q4HRs X 24 hrs (comment for end date)         Problem: High Risk or History of JED  Goal: Recognition of JED or High Risk for JED  Outcome: Progressing Towards Goal

## 2021-05-25 NOTE — PROGRESS NOTES
[facility]   [address] [address], [facility] [address], [facility]  Phone: [phone]   [phone]       Nephrology Progress Note  Mark Clarke     1950     [id number]  Date of Admission : 5/11/2021 05/25/21    CC: Follow up for ROCIO    Assessment and Plan   ROCIO on CKD:  - 2/2 progressive CKD and CRS  - dialysis initiated 4/26/21. Has RIJ permacath for access. - HD TTS for now  - HD today, UF goal 1 kg as tolerated  - working on outpt unit placement at [facility] 3 chopt    Acute on chronic Hypoxic Resp failure :  - baseline severe COPD + chronic CHF + morbid obesity/ JED   - Pulm HTN   - extubated 5/13     Chronic systolic CHF, stage C NYHA class IV  Combined ischemic and nonischemic cardiomyopathy  S/p HM 2 LVAD implant 4/15/2017 by Dr. Robyn Templeton at [facility] 16  hx of recurrent VT : off mexiletine  chronic RV failure. 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 - per AHF team      Chronic anemia :   ATTR amyloidosis  - anemia of chronic disease   - continue HEIKE  MWF     Chronic A. Fib. History of endometrial Ca       Interval History:  Seen and examined. About to start dialysis. Confused overnight, seems better this AM.  MAPs stable. No reported cp or sob. On BiPAP now. Review of Systems: Pertinent items are noted in HPI.     Current Medications:   Current Facility-Administered Medications   Medication Dose Route Frequency    warfarin (COUMADIN) tablet 4 mg  4 mg Oral ONCE    isosorbide mononitrate ER (IMDUR) tablet 30 mg  30 mg Oral BID    L.acidophilus-paracasei-S.thermophil-bifidobacter (RISAQUAD) 8 billion cell capsule  1 Capsule Oral DAILY    loperamide (IMODIUM) capsule 2 mg  2 mg Oral Q4H PRN    albuterol-ipratropium (DUO-NEB) 2.5 MG-0.5 MG/3 ML  3 mL Nebulization BID RT    simethicone (MYLICON) tablet 80 mg  80 mg Oral QID PRN    epoetin amalia-epbx (RETACRIT) injection 20,000 Units  20,000 Units SubCUTAneous Q TUE, THU & SAT    artificial saliva (MOUTH KOTE) 1 Spray  1 Spray Oral PRN    cephALEXin (KEFLEX) capsule 250 mg  250 mg Oral BID    PARoxetine (PAXIL) tablet 20 mg  20 mg Oral DAILY    hydrocortisone (CORTAID) 1 % cream   Topical PRN    glucose chewable tablet 16 g  4 Tablet Oral PRN    dextrose (D50W) injection syrg 12.5-25 g  25-50 mL IntraVENous PRN    glucagon (GLUCAGEN) injection 1 mg  1 mg IntraMUSCular PRN    insulin lispro (HUMALOG) injection   SubCUTAneous AC&HS    famotidine (PEPCID) tablet 20 mg  20 mg Oral DAILY    benzocaine-zinc cl-benzalkonium cl (ORAJEL) 20-0.1-0.02 % mucosal gel   Oral PRN    magic mouthwash cpd (without sucralfate)  5 mL Oral DAILY PRN    balsam peru-castor oiL (VENELEX) ointment   Topical BID    albumin human 25% (BUMINATE) solution 50 g  50 g IntraVENous DIALYSIS PRN    heparin (porcine) 1,000 unit/mL injection 1,900 Units  1,900 Units InterCATHeter DIALYSIS PRN    And    heparin (porcine) 1,000 unit/mL injection 1,900 Units  1,900 Units InterCATHeter DIALYSIS PRN    hydrALAZINE (APRESOLINE) 20 mg/mL injection 20 mg  20 mg IntraVENous Q6H PRN    hydrALAZINE (APRESOLINE) 20 mg/mL injection 10 mg  10 mg IntraVENous Q6H PRN    ranolazine ER (RANEXA) tablet 1,000 mg  1,000 mg Oral BID    hydrALAZINE (APRESOLINE) tablet 100 mg  100 mg Oral TID    tafamidis cap 61 mg (Patient Supplied)  61 mg Oral DAILY    ondansetron (ZOFRAN) injection 4 mg  4 mg IntraVENous Q4H PRN    zinc oxide-cod liver oil (DESITIN) 40 % paste   Topical Q12H    sodium chloride (NS) flush 5-10 mL  5-10 mL IntraVENous PRN    sodium chloride (NS) flush 5-40 mL  5-40 mL IntraVENous Q8H    sodium chloride (NS) flush 5-40 mL  5-40 mL IntraVENous PRN    acetaminophen (TYLENOL) tablet 650 mg  650 mg Oral Q6H PRN    Or    acetaminophen (TYLENOL) suppository 650 mg  650 mg Rectal Q6H PRN    Warfarin - Pharmacy to Dose   Other Rx Dosing/Monitoring    sodium chloride (NS) flush 5-40 mL  5-40 mL IntraVENous Q8H    sodium chloride (NS) flush 5-40 mL  5-40 mL IntraVENous PRN    nystatin (MYCOSTATIN) 100,000 unit/gram powder   Topical PRN      Allergies   Allergen Reactions    Benzodiazepines Other (comments)     Respiratory issues       Objective:  Vitals:    Vitals:    05/25/21 0005 05/25/21 0400 05/25/21 0639 05/25/21 0814   Pulse:  (!) 103 96 95   Resp:  24 22 18   Temp:  97 °F (36.1 °C)  98.3 °F (36.8 °C)   TempSrc:       SpO2: 94% 95% 96% 92%   Weight:       Height:         Intake and Output:  No intake/output data recorded. 05/23 1901 - 05/25 0700  In: 1200 [P.O.:1070; I.V.:130]  Out: 0     Physical Examination:    General: Obese, on BiPAP  HEENT            NAD, pleasant  Neck:  permacath +  Resp:  Lungs clear B/L, no wheezing   CV:  RRR,  S1,S2  GI:  Soft, NT, + BS  Neurologic:  Mild confusion, + asterixis  Psych:             Unable to assess this AM      []    High complexity decision making was performed  []    Patient is at high-risk of decompensation with multiple organ involvement    Lab Data Personally Reviewed: I have reviewed all the pertinent labs, microbiology data and radiology studies during assessment.     Recent Labs     05/25/21 0349 05/24/21 0017 05/23/21 0332    134* 136   K 3.3* 3.6 3.8    99 101   CO2 25 27 27   * 119* 112*   BUN 27* 22* 14   CREA 5.11* 4.15* 3.14*   CA 9.2 8.9 8.8   MG 2.4 2.1  --    PHOS 4.8* 3.8  --    ALB 3.2* 3.0* 3.0*   ALT 8* 7* 6*   INR 1.5* 1.3* 1.3*     Recent Labs     05/25/21 0349 05/24/21 0017 05/23/21 0332   WBC 4.7 4.6 4.2   HGB 8.6* 8.7* 8.9*   HCT 28.7* 29.7* 29.8*   * 129* 139*     No results found for: SDES  Lab Results   Component Value Date/Time    Culture result: CANDIDA ALBICANS (A) 05/13/2021 11:29 AM    Culture result: RARE DIPHTHEROIDS (A) 05/12/2021 04:29 AM    Culture result: LIGHT YEAST, (APPARENT CANDIDA ALBICANS) (A) 05/11/2021 07:09 PM    Culture result: LIGHT NORMAL RESPIRATORY BETO 05/11/2021 07:09 PM    Culture result: NO GROWTH 5 DAYS 05/11/2021 04:30 PM     Recent Results (from the past 24 hour(s))   GLUCOSE, POC    Collection Time: 05/24/21 11:42 AM   Result Value Ref Range    Glucose (POC) 111 65 - 117 mg/dL    Performed by SHA GARDNER    POC EG7    Collection Time: 05/24/21  1:35 PM   Result Value Ref Range    Calcium, ionized (POC) 1.17 1.12 - 1.32 mmol/L    pH (POC) 7.38 7.35 - 7.45      pCO2 (POC) 41.1 35.0 - 45.0 MMHG    pO2 (POC) 62 (L) 80 - 100 MMHG    HCO3 (POC) 24.3 22 - 26 MMOL/L    Base deficit (POC) 0.8 mmol/L    sO2 (POC) 91.0 (L) 92 - 97 %    Specimen type (POC) ARTERIAL     GLUCOSE, POC    Collection Time: 05/24/21  5:00 PM   Result Value Ref Range    Glucose (POC) 120 (H) 65 - 117 mg/dL    Performed by [facility]    GLUCOSE, POC    Collection Time: 05/24/21  9:24 PM   Result Value Ref Range    Glucose (POC) 128 (H) 65 - 117 mg/dL    Performed by SHA GARDNER    GLUCOSE, POC    Collection Time: 05/25/21  3:40 AM   Result Value Ref Range    Glucose (POC) 119 (H) 65 - 117 mg/dL    Performed by [address] [place] [address] + INR    Collection Time: 05/25/21  3:49 AM   Result Value Ref Range    INR 1.5 (H) 0.9 - 1.1      Prothrombin time 15.8 (H) 9.0 - 11.1 sec   LD    Collection Time: 05/25/21  3:49 AM   Result Value Ref Range     81 - 246 U/L   CBC WITH AUTOMATED DIFF    Collection Time: 05/25/21  3:49 AM   Result Value Ref Range    WBC 4.7 3.6 - 11.0 K/uL    RBC 3.12 (L) 3.80 - 5.20 M/uL    HGB 8.6 (L) 11.5 - 16.0 g/dL    HCT 28.7 (L) 35.0 - 47.0 %    MCV 92.0 80.0 - 99.0 FL    MCH 27.6 26.0 - 34.0 PG    MCHC 30.0 30.0 - 36.5 g/dL    RDW 19.9 (H) 11.5 - 14.5 %    PLATELET 827 (L) 326 - 400 K/uL    MPV 8.9 8.9 - 12.9 FL    NRBC 0.0 0  WBC    ABSOLUTE NRBC 0.00 0.00 - 0.01 K/uL    NEUTROPHILS 79 (H) 32 - 75 %    LYMPHOCYTES 11 (L) 12 - 49 %    MONOCYTES 9 5 - 13 %    EOSINOPHILS 0 0 - 7 %    BASOPHILS 1 0 - 1 %    IMMATURE GRANULOCYTES 0 0.0 - 0.5 %    ABS. NEUTROPHILS 3.8 1.8 - 8.0 K/UL    ABS. LYMPHOCYTES 0.5 (L) 0.8 - 3.5 K/UL    ABS. MONOCYTES 0.4 0.0 - 1.0 K/UL    ABS. EOSINOPHILS 0.0 0.0 - 0.4 K/UL    ABS. BASOPHILS 0.0 0.0 - 0.1 K/UL    ABS. IMM. GRANS. 0.0 0.00 - 0.04 K/UL    DF SMEAR SCANNED      RBC COMMENTS ANISOCYTOSIS  1+        RBC COMMENTS TEARDROP CELLS  PRESENT        RBC COMMENTS OVALOCYTES  1+        RBC COMMENTS MICROCYTOSIS  1+       METABOLIC PANEL, COMPREHENSIVE    Collection Time: 05/25/21  3:49 AM   Result Value Ref Range    Sodium 137 136 - 145 mmol/L    Potassium 3.3 (L) 3.5 - 5.1 mmol/L    Chloride 100 97 - 108 mmol/L    CO2 25 21 - 32 mmol/L    Anion gap 12 5 - 15 mmol/L    Glucose 121 (H) 65 - 100 mg/dL    BUN 27 (H) 6 - 20 MG/DL    Creatinine 5.11 (H) 0.55 - 1.02 MG/DL    BUN/Creatinine ratio 5 (L) 12 - 20      GFR est AA 10 (L) >60 ml/min/1.73m2    GFR est non-AA 8 (L) >60 ml/min/1.73m2    Calcium 9.2 8.5 - 10.1 MG/DL    Bilirubin, total 0.6 0.2 - 1.0 MG/DL    ALT (SGPT) 8 (L) 12 - 78 U/L    AST (SGOT) 16 15 - 37 U/L    Alk.  phosphatase 54 45 - 117 U/L    Protein, total 7.5 6.4 - 8.2 g/dL    Albumin 3.2 (L) 3.5 - 5.0 g/dL    Globulin 4.3 (H) 2.0 - 4.0 g/dL    A-G Ratio 0.7 (L) 1.1 - 2.2     MAGNESIUM    Collection Time: 05/25/21  3:49 AM   Result Value Ref Range    Magnesium 2.4 1.6 - 2.4 mg/dL   PHOSPHORUS    Collection Time: 05/25/21  3:49 AM   Result Value Ref Range    Phosphorus 4.8 (H) 2.6 - 4.7 MG/DL   POC EG7    Collection Time: 05/25/21  3:56 AM   Result Value Ref Range    Calcium, ionized (POC) 1.14 1.12 - 1.32 mmol/L    FIO2 (POC) 21 %    pH (POC) 7.39 7.35 - 7.45      pCO2 (POC) 40.2 35.0 - 45.0 MMHG    pO2 (POC) 72 (L) 80 - 100 MMHG    HCO3 (POC) 24.3 22 - 26 MMOL/L    Base deficit (POC) 0.7 mmol/L    sO2 (POC) 94.1 92 - 97 %    Site LEFT RADIAL      Device: CPAP      Mode Pressure Support      PEEP/CPAP (POC) 10 cmH2O    Allens test (POC) Positive      Specimen type (POC) ARTERIAL     AMMONIA    Collection Time: 05/25/21  4:29 AM   Result Value Ref Range Ammonia <10 <32 UMOL/L   GLUCOSE, POC    Collection Time: 05/25/21  6:44 AM   Result Value Ref Range    Glucose (POC) 156 (H) 65 - 117 mg/dL    Performed by PORSCHE ALDRICH            Total time spent with patient:  xxx   min. Care Plan discussed with:  Patient     Family      RN      Consulting Physician [id number] [facility],         I have reviewed the flowsheets. Chart and Pertinent Notes have been reviewed. No change in PMH ,family and social history from Consult note.       You Navarro MD

## 2021-05-25 NOTE — PROGRESS NOTES
1930: Bedside and Verbal shift change report given to Jamel Ackerman RN (oncoming nurse) by Wolfgang Murphy RN (offgoing nurse). Report included the following information SBAR, Kardex, Procedure Summary, Intake/Output, MAR, Recent Results and Med Rec Status. 2100: Patient appears lethargic and is oriented to self only. Dr. Bel Whitney saw patient prior to this shift and ordered STAT ABGs. Patient continues with the confusion and lethargy. Patient woke up enough to swallow three evening pills but was unable to swallow scheduled hydralazine. Suspect that patient has not been using Trilogy for sleep. Trilogy placed on patient and daughter at bedside. RN spoke with Dialysis RN requesting patient be dialyzed first thing in the morning if possible. Dialysis RN to pass along in the morning. Patient confusion possibly due to Atarax given on 5/32-5/24.     0000: Patient has a sitter due to pulling at critical lines and continuing to take off Trilogy. 4922: RRT called. . Map 108 (RN to give PRN Hydralazine POST Hydralazine). Patient more awake with garbled speech. She was eventually able to say \"I forget sometimes\" repeatedly in response to any question or comment made. Patient did not seem to recognize her daughter nor could she verbalize her name. She eventually started to spell the word \"ordinary\" and say \"yeah\" occasionally. Shaheed Carter, NP at [facility]. NP order for STAT labs including an Ammonia level, ABG, and CT. Respiratory reported improvement in PO2 since last ABG. RN awaiting transport to assist to CT.    0700: Patient is more responsive this morning. She recognizes her daughter and is following commands. Dialysis called and states she will be ad bedside around 0730. Bedside and Verbal shift change report given to Michelet Jaramillo RN (oncoming nurse) by Jamel Ackerman RN (offgoing nurse). Report included the following information SBAR, Kardex, Procedure Summary, Intake/Output, MAR, Recent Results and Med Rec Status.

## 2021-05-25 NOTE — PROGRESS NOTES
[facility] in [place], [place]  Inpatient Consult Progress Note      Patient name: Lilly Selby  Patient : 1950  Patient MRN: [id number]  Consulting MD: Alonzo Reyna MD    Date of service: 21    REASON FOR CONSULT:  Candida sepsis     PLAN:  Hold discharge due to change of mental status    Continue set speed 9400rpm, low speed 9000rpm   Continue hydralazine 100mg TID and imdur to 30mg twice daily  Cont Tafamidis 61mg daily  Intolerant to BB/ACEi/ARB/ARNI due to aTTR  Intolerant to MRA due to hyperkalemia  Volume management per Nephrology; strict I/O, standing weights  Pulmonary hygiene including IS and nebulizers   ID recommendations appreciated for C. Albicans in urine and sputum   Blood cultures negative   Drive line culture- rare diphtheroids   Coumadin dose per pharmacy; goal INR lowered to 1.8-2.5 due to GIB  Cont home keflex dose- d/w with ID  Appreciate EP recommendations- no changes to current therapy.    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 Vitaliy Olivares)  · C/b delayed extubation due to severe COPD  · C/b critical illness polyneuropathy  · C/b prolonged hospitalization post-LVAD, 55 days  · C/b sternal wound infection s/p debridement (by Dr. Jacoby Phillip) s/p wound vac  · C/b sacral ulcer  · Would culture positive for Staph aureus, not MRSA  · C/b LVAD site drainage, improved  S/p CRT-ICD  · ICD fired due to electrolyte imbalance (2011)  H/o breast cancer (1992)   · s/p bilateral mastectomy/chemo and endometrial cancer s/p hysterectomy  · Lymphedema of LLE due to cancer treatment  Severe COPD with FEV1 50%  Depression  Atrial fibrillation  H/o \"two mini strokes\"  S/p fall with hip facture   · Right hip hemmiarthroplasty (5/23/18) by Dr. Katelyn Galvan)  · S/p removed hip hardwarare due to pain (4/15/19)  COPD severe  CKD, stage 4- on HD  Hyperkalemia, resolved  Pulmonary hypertension  Cardiac risk factors:  · Morbid obesity, Body mass index is 42.29 kg/m². · DM2 insulin dependent  · JED on Trilogy  · HL  Urinary incontinence, severe  · no procedures due to anticoagulation  Endometrial cancer (2002)  HTN  HL     Recent Events:  Difficulty to arouse this morning  Daughter feels this is after atarax  PCT normalized   Confusion with dialysis      CARDIAC IMAGING:  TTE 5/11/21 LVEF < 15%, LVIDd 5.96cm, TAPSE 0.99cm, RVIDd 4.14cm  TTE 4/19 shows improved LVIDd, 6.68 cm with RVIDd 5.14 cm and TAPSE 1.1 cm   TTE 4/28/21 LVIDd 7.37cm, RVIDd 5.27cm, TAPSE 1.44cm   Echo (9/24/19) LVEF 20-25%, AV opens 1:1, no AR  Echo (5/29/18) LVEF 10-%, ramps study done, report in [facility]  Echo (1/9/18) ramp study done, LVEDD 7.1cm  LHC (11/9/18) 2 vessel disease with 90% OM, 80% PDA, DSA to PDA branch  RHC 4/20 showed adequate Sal CI 3.0 but severely elevated RA pressure 24 mmHg  CXR negative 4/27/21     INTERVAL HISTORY:  Ms. Kasia Sears is [place][age][place][age] y.o.  Female s/p LVAD implant with HM 2 and ESRD requiring HD that was recently admitted for RV failure and worsening renal function. She was discharged on 5/5/21 to [facility] for rehabilitation before transitioning home to live her with daughter.  In the last few days she had worsening mental status changed, decreased UOP and respiratory distress after possible aspiration- she was intubated and admitted to [facility] for further management. It appears patient condition was triggered by candida UTI and likely intolerance of BIPAP - inadequately supporting ventilation in rehab - patient is dependent on Trilogy support. LVAD INTERROGATION:  Device interrogated in person  Device function normal, normal flow, no events  LVAD   Pump Speed (RPM): 9400  Pump Flow (LPM): 5.2  MAP: 90  PI (Pulsitility Index): 6  Power: 5.2   Test: Yes  Back Up  at Bedside & Labeled: Yes  Power Module Test: Yes  Driveline Site Care: No  Driveline Dressing: Clean, Dry, and Intact  Outpatient: No  MAP in Therapeutic Range (Outpatient): No  Testing  Alarms Reviewed: Yes  Back up SC speed: 9400  Back up Low Speed Limit: 9000  Emergency Equipment with Patient?: Yes  Emergency procedures reviewed?: No  Drive line site inspected?: Yes  Drive line intergrity inspected?: Yes  Drive line dressing changed?: No    PHYSICAL EXAM:  Visit Vitals  /80 Comment: treatment ended. Blood returned   Pulse 98   Temp 98.1 °F (36.7 °C) (Axillary)   Resp 18   Ht 5' 7\" (1.702 m)   Wt 233 lb 11 oz (106 kg)   SpO2 97%   BMI 36.60 kg/m²     General: Patient is well developed, well-nourished in no acute distress  HEENT: Normocephalic and atraumatic. No scleral icterus. Pupils are equal, round and reactive to light and accomodation. No conjunctival injection. Oropharynx is clear. Neck: Supple. No evidence of thyroid enlargements or lymphadenopathy. JVD: Cannot be appreciated   Lungs: Breath sounds are equal and clear bilaterally. No wheezes, rhonchi, or rales. Heart: Regular rate and rhythm with normal S1 and S2. No murmurs, gallops or rubs. Abdomen: Soft, no mass or tenderness. No organomegaly or hernia. Bowel sounds present. Genitourinary and rectal: deferred  Extremities: No cyanosis, clubbing, or edema.   Neurologic: Unable to determine  Psychiatric: Sleeping with BIPAP on; difficult to arouse  Skin: Warm, dry and well perfused. No lesions, nodules or rashes are noted. REVIEW OF SYSTEMS:  General: Unable to obtain. PAST MEDICAL HISTORY:  Past Medical History:   Diagnosis Date    Asthma     Cancer ([facility]     breast    [facility]     endometrial    Congestive heart failure, unspecified     CRI (chronic renal insufficiency)     Depression     Diabetes [facility]     Hypertension     Severe sepsis with acute organ dysfunction [facility] 5/11/2021       PAST SURGICAL HISTORY:  Past Surgical History:   Procedure Laterality Date    HX HERNIA REPAIR      HX HYSTERECTOMY      HX MASTECTOMY      IR INSERT NON TUNL CVC OVER 5 YRS  4/26/2021    IR INSERT TUNL CVC W/O PORT OVER 5 YR  5/5/2021       FAMILY HISTORY:  No family history on file. SOCIAL HISTORY:  Social History     Socioeconomic History    Marital status:      Spouse name: Not on file    Number of children: Not on file    Years of education: Not on file    Highest education level: Not on file   Tobacco Use    Smoking status: Never Smoker    Smokeless tobacco: Never Used   Substance and Sexual Activity    Alcohol use: Not Currently     Social Determinants of Health     Financial Resource Strain:     Difficulty of Paying Living Expenses:    Food Insecurity:     Worried About Running Out of Food in the Last Year:     [address] in the Last Year:    Transportation Needs:     Lack of Transportation (Medical):      Lack of Transportation (Non-Medical):    Physical Activity:     Days of Exercise per Week:     Minutes of Exercise per Session:    Stress:     Feeling of Stress :    Social Connections:     Frequency of Communication with Friends and Family:     Frequency of Social Gatherings with Friends and Family:     Attends Yazidism Services:     Active Member of Clubs or Organizations:     Attends Club or Organization Meetings:     Marital Status:        LABORATORY RESULTS:     Labs Latest Ref Rng & Units 5/25/2021 5/24/2021 5/23/2021 5/22/2021 5/21/2021 5/20/2021 5/19/2021   WBC 3.6 - 11.0 K/uL 4.7 4.6 4.2 4.2 4.1 4.8 3.5(L)   RBC 3.80 - 5.20 M/uL 3.12(L) 3.22(L) 3.26(L) 2.97(L) 3.18(L) 3.35(L) 3.38(L)   Hemoglobin 11.5 - 16.0 g/dL 8.6(L) 8.7(L) 8. 9(L) 8. 1(L) 8.6(L) 9.0(L) 9.1(L)   Hematocrit 35.0 - 47.0 % 28. 7(L) 29. 7(L) 29. 8(L) 27. 7(L) 29. 0(L) 30. 4(L) 31. 2(L)   MCV 80.0 - 99.0 FL 92.0 92.2 91.4 93.3 91.2 90.7 92.3   Platelets 878 - 929 K/uL 130(L) 129(L) 139(L) 133(L) 152 154 143(L)   Lymphocytes 12 - 49 % 11(L) 11(L) 10(L) 11(L) 11(L) 11(L) 11(L)   Monocytes 5 - 13 % 9 9 8 8 9 8 13   Eosinophils 0 - 7 % 0 0 0 0 0 0 0   Basophils 0 - 1 % 1 1 1 1 1 1 1   Bands 0 - 6 % - - - - - - -   Albumin 3.5 - 5.0 g/dL 3.2(L) 3.0(L) 3.0(L) 2. 9(L) 2. 9(L) 3.0(L) 3.0(L)   Calcium 8.5 - 10.1 MG/DL 9.2 8.9 8.8 9.0 8.8 9.0 8.8   Glucose 65 - 100 mg/dL 121(H) 119(H) 112(H) 125(H) 142(H) 124(H) 116(H)   BUN 6 - 20 MG/DL 27(H) 22(H) 14 28(H) 20 35(H) 27(H)   Creatinine 0.55 - 1.02 MG/DL 5.11(H) 4.15(H) 3.14(H) 4.97(H) 3.45(H) 4.77(H) 3.86(H)   Sodium 136 - 145 mmol/L 137 134(L) 136 135(L) 136 134(L) 139   Potassium 3.5 - 5.1 mmol/L 3. 3(L) 3.6 3.8 3.9 3.7 4.0 3.9   TSH 0.36 - 3.74 uIU/mL - - - - - - -   LDH 81 - 246 U/L 197 194 210 192 214 216 186   Some recent data might be hidden     Lab Results   Component Value Date/Time    TSH 1.60 05/15/2021 03:14 AM    TSH 5.36 (H) 04/24/2021 04:32 AM    TSH 3.99 (H) 04/17/2021 04:54 AM    TSH 2.980 10/01/2020 12:00 AM       ALLERGY:  Allergies   Allergen Reactions    Benzodiazepines Other (comments)     Respiratory issues        CURRENT MEDICATIONS:    Current Facility-Administered Medications:     warfarin (COUMADIN) tablet 4 mg, 4 mg, Oral, ONCE, Rosemarie Ward MD    isosorbide mononitrate ER (IMDUR) tablet 30 mg, 30 mg, Oral, BID, Whitney Baig MD, 30 mg at 05/24/21 1748    L.acidophilus-paracasei-S.thermophil-bifidobacter (RISAQUAD) 8 billion cell capsule, 1 Capsule, Oral, DAILY, CLAY Ward MD, 1 Capsule at 05/24/21 0955    loperamide (IMODIUM) capsule 2 mg, 2 mg, Oral, Q4H PRN, Rosemarie Ward MD    albuterol-ipratropium (DUO-NEB) 2.5 MG-0.5 MG/3 ML, 3 mL, Nebulization, BID RT, Duane Castaneda MD, 3 mL at 05/24/21 0713    simethicone (MYLICON) tablet 80 mg, 80 mg, Oral, QID PRN, HANK Ward MD    epoetin amalia-epbx (RETACRIT) injection 20,000 Units, 20,000 Units, SubCUTAneous, Q TUE, THU & SAT, Kyle Pimentel MD, 20,000 Units at 05/22/21 2038    artificial saliva (MOUTH KOTE) 1 Spray, 1 Spray, Oral, PRN, Braxton, Triny B, NP    cephALEXin (KEFLEX) capsule 250 mg, 250 mg, Oral, BID, Braxton, Triny B, NP, 250 mg at 05/24/21 2120    PARoxetine (PAXIL) tablet 20 mg, 20 mg, Oral, DAILY, Festus Ribeiro MD, 20 mg at 05/24/21 0956    hydrocortisone (CORTAID) 1 % cream, , Topical, PRN, Festus Ribeiro MD, Given at 05/24/21 1746    glucose chewable tablet 16 g, 4 Tablet, Oral, PRN, Elda Pineda MD    dextrose (D50W) injection syrg 12.5-25 g, 25-50 mL, IntraVENous, PRN, Elda Pineda MD    glucagon [facility]) injection 1 mg, 1 mg, IntraMUSCular, PRN, Elda Pineda MD    insulin lispro (HUMALOG) injection, , SubCUTAneous, AC&HS, Yadira Reed MD, 2 Units at 05/24/21 0956    famotidine (PEPCID) tablet 20 mg, 20 mg, Oral, DAILY, Duane Castaneda MD, 20 mg at 05/24/21 0955    benzocaine-zinc cl-benzalkonium cl (ORAJEL) 20-0.1-0.02 % mucosal gel, , Oral, PRN, Duane Castaneda MD    magic mouthwash cpd (without sucralfate), 5 mL, Oral, DAILY PRN, Duane Castaneda MD, 5 mL at 05/19/21 1507    balsam peru-castor oiL (VENELEX) ointment, , Topical, BID, Duane Castaneda MD, Given at 05/25/21 0876    albumin human 25% (BUMINATE) solution 50 g, 50 g, IntraVENous, DIALYSIS PRN, Jaspreet Mendoza MD    heparin (porcine) 1,000 unit/mL injection 1,900 Units, 1,900 Units, InterCATHeter, DIALYSIS PRN, 1,900 Units at 05/25/21 1045 **AND** heparin (porcine) 1,000 unit/mL injection 1,900 Units, 1,900 Units, InterCATHeter, DIALYSIS PRN, Dat Miranda MD, 1,900 Units at 05/25/21 1044    hydrALAZINE (APRESOLINE) 20 mg/mL injection 20 mg, 20 mg, IntraVENous, Q6H PRN, Braxton, Triny B, NP, 20 mg at 05/24/21 0010    hydrALAZINE (APRESOLINE) 20 mg/mL injection 10 mg, 10 mg, IntraVENous, Q6H PRN, Braxton, Triny B, NP, 10 mg at 05/25/21 2865    ranolazine ER (RANEXA) tablet 1,000 mg, 1,000 mg, Oral, BID, Braxton, Triny B, NP, 1,000 mg at 05/24/21 2121    hydrALAZINE (APRESOLINE) tablet 100 mg, 100 mg, Oral, TID, Braxton, Triny B, NP, 100 mg at 05/24/21 1748    tafamidis cap 61 mg (Patient Supplied), 61 mg, Oral, DAILY, Grant Christian MD, 61 mg at 05/24/21 0955    ondansetron (ZOFRAN) injection 4 mg, 4 mg, IntraVENous, Q4H PRN, Valeriy Morrow MD, 4 mg at 05/23/21 1216    zinc oxide-cod liver oil (DESITIN) 40 % paste, , Topical, Q12H, Marielle MCGREGOR MD, Given at 05/25/21 0828    sodium chloride (NS) flush 5-10 mL, 5-10 mL, IntraVENous, PRN, Tae Hooks MD    sodium chloride (NS) flush 5-40 mL, 5-40 mL, IntraVENous, Q8H, Maricarmen Oliver MD, 10 mL at 05/25/21 0645    sodium chloride (NS) flush 5-40 mL, 5-40 mL, IntraVENous, PRN, Maricarmen Oliver MD    acetaminophen (TYLENOL) tablet 650 mg, 650 mg, Oral, Q6H PRN **OR** acetaminophen (TYLENOL) suppository 650 mg, 650 mg, Rectal, Q6H PRN, Maricarmen Oliver MD    Warfarin - Pharmacy to Dose, , Other, Rx Dosing/Monitoring, Polliard, Clifford Slim, NP    sodium chloride (NS) flush 5-40 mL, 5-40 mL, IntraVENous, Q8H, Braxton, Triny B, NP, 10 mL at 05/25/21 0645    sodium chloride (NS) flush 5-40 mL, 5-40 mL, IntraVENous, PRN, Braxton, Triny B, NP, 10 mL at 05/23/21 1216    nystatin (MYCOSTATIN) 100,000 unit/gram powder, , Topical, PRN, Marielle Peralta MD    PATIENT CARE TEAM:  Patient Care Team:  Rachid Garcia NP as PCP - General (Nurse Practitioner)  Sofia Cortez MD (Cardiology)  Sonia Bagn MD as Physician (Cardiology)  Abraham Jarrett LPN as Care Coordinator  Pablo Barraza MD (Cardiothoracic Surgery)  Rodolfo Wilkins MD (Cardiology)     Thank you for allowing me to participate in this patient's care. Laquita Wolf NP  [facility] [address]  [address] [facility], [address]  Phone: [phone]    [facility] ATTENDING ADDENDUM    Patient was seen and examined in person. Data and notes were reviewed. I have discussed and agree with the plan as noted in the NP note above without further additions.     Aelxis Cordero MD PhD  [id number]

## 2021-05-25 NOTE — PROGRESS NOTES
[facility]    ICU TEAM Progress Note    Name: Royal Courser   : 1950   MRN: [id number]   Date: 2021           ICU Assessment     1. Septic Shock (drive-line? Urine? Chest?)  2. Acute Metabolic Encephalopathy  a. Infection?  b. Hypoactive Delirium?  c. Hypoxia?  d. Multi-factoral?  3. Acute on Chronic Hypoxic Respiratory Failure  4. Atelectasis  5. Acute Kidney Injury/CKD  a. IHD per Nephrology (TTS; Marlene/Karen)  6. Morbid Obesity  7. S/p LVAD (4/15/17 at [facility])  8. Hx of Recurrent VT  9. Mild Pulm HTN  10. 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 C. albicans (in urine)  13. A fib  14. Chronic Anemia  15. ATTR Amyloidosis             ICU Comprehensive Plan of Care:     1. Neurological System  Avoid Benzos/Narcotics  Lights on during day  Low dose Precedex  Neurology consulted per HM team  EEG Pending  CT head Neg  Spontaneous Awakening Trial: N/A  Sedation: None  Analgesia: Acetaminophen    2. Cardiovascular System  LVAD  CTS/AHF Following  MAP Goal of: > 65 mmHg  None - For above SBP/MAP goals  Transfusion Trigger (Hgb): <7 g/dL  Keep K > 4; Mg > 2     3. Respiratory System  Utilize triology as able  Spontaneous Breathing Trial: N/A  Pulmonary toilet: Duo-Nebs   SpO2 Goal: > 90%  DVT Prophylaxis: SCD's or Sequential Compression Device     4. Renal/GI/Endocrine System  Appreciate Nephrology  IHD  Ulcer Prophylaxis: Pepcid (famotidine)   Bowel Regimen: None needed at this time  Feeding:  Pending   Blood Sugar Goal 120-180 - Glycemic Control: Insulin    5. Infectious Disease  Appreciate ID Team  Indwelling Catheter:  Tubes: None  Lines: Central Line and Antonio  Drains: None  D - De-escalation of Antibiotics:   Ancef    6. PT/OT: PT consulted and on board, OT consulted and on board and Speech therapy consulted and on board     7. Goals of Care Discussion with family Yes     8. Plan of Care/Code Status: Full Code    9.  Discussed Care Plan with Bedside RN    10. Documentation of Current Medications    Subjective:   Progress Note: 5/25/2021      Reason for ICU Admission: Acute Metabolic Encephalopathy & Respiratory Faliure     HPI:  Asked to see patient for possible ICU admission. Long, chronic history (see assessment above), prior LVAD, now dialysis dependent, worsening mental status, on IHD, multiple infections this hospitalizations as well as failure to thrive. RRT on 5/25 & 5/26, due to lethargy that is waxing & waning. ABG (with hypoxia), CT head (WNL) and dialysis completed -- patient still waxing & waning. Upon my assessment, she MIJARES's, arousable, one word answers to daughter at the bedside and appears to be protecting her airway.      Overnight Events:   NA      Active Problem List:     Problem List  Date Reviewed: 4/25/2021        Codes Class    Severe sepsis with acute organ dysfunction ([facility] ICD-10-CM: A41.9, R65.20  ICD-9-CM: 038.9, 995.92 Acute        * (Principal) Acute encephalopathy ICD-10-CM: G93.40  ICD-9-CM: 348.30         Dyspnea ICD-10-CM: R06.00  ICD-9-CM: 786.09         HAIRSTON (dyspnea on exertion) ICD-10-CM: R06.00  ICD-9-CM: 786.09         Incontinence in female ICD-10-CM: R32  ICD-9-CM: 625.6         LVAD (left ventricular assist device) present [facility]) ICD-10-CM: Z95.811  ICD-9-CM: V43.21         Hospital discharge follow-up ICD-10-CM: Z09  ICD-9-CM: V67.59         ICD (implantable cardioverter-defibrillator) battery depletion ICD-10-CM: Z45.02  ICD-9-CM: V53.32         Coagulopathy ([facility] ICD-10-CM: D68.9  ICD-9-CM: 286.9         Open wound of left lower leg ICD-10-CM: Q80.783F  ICD-9-CM: 891.0         Anemia ICD-10-CM: D64.9  ICD-9-CM: 285.9         ROCIO (acute kidney injury) ([facility] ICD-10-CM: N17.9  ICD-9-CM: 584.9         NSVT (nonsustained ventricular tachycardia) ([facility]) ICD-10-CM: I47.2  ICD-9-CM: 427.1         NICM (nonischemic cardiomyopathy) ([facility]) ICD-10-CM: I42.8  ICD-9-CM: 425.4         Coronary artery disease involving native coronary artery of native heart without angina pectoris ICD-10-CM: I25.10  ICD-9-CM: 414.01         JED on CPAP ICD-10-CM: G47.33, Z99.89  ICD-9-CM: 327.23, V46.8         Chronic venous insufficiency ICD-10-CM: I87.2  ICD-9-CM: 459.81         Ulcer of right foot, limited to breakdown of skin ([facility] ICD-10-CM: F68.143  ICD-9-CM: 707.15         Acute on chronic systolic CHF (congestive heart failure) (HCC) ICD-10-CM: I50.23  ICD-9-CM: 428.23, 428.0         Obesity, morbid (HCC) ICD-10-CM: E66.01  ICD-9-CM: 278.01               Past Medical History:      has a past medical history of Asthma, Cancer ([facility] Cancer ([facility] Congestive heart failure, unspecified, CRI (chronic renal insufficiency), Depression, Diabetes ([facility] Hypertension, and Severe sepsis with acute organ dysfunction ([facility] (5/11/2021). Past Surgical History:      has a past surgical history that includes hx mastectomy; hx hysterectomy; hx hernia repair; ir insert non tunl cvc over 5 yrs (4/26/2021); and ir insert tunl cvc w/o port over 5 yr (5/5/2021). Home Medications:     Prior to Admission medications    Medication Sig Start Date End Date Taking? Authorizing Provider   warfarin (COUMADIN) 3 mg tablet Take 3 mg by mouth every other day. 3 mg every other day alternating with 4 mg every other day   Yes Provider, Historical   warfarin (COUMADIN) 4 mg tablet Take 4 mg by mouth every other day. 3 mg every other day alternating with 4 mg every other day   Yes Provider, Historical   bumetanide (BUMEX) 2 mg tablet Take 1 Tab by mouth three (3) times daily for 30 days. 5/6/21 6/5/21  Aditi Ribeiro MD   isosorbide mononitrate ER (IMDUR) 30 mg tablet Take 1 Tab by mouth daily for 30 days. 5/7/21 6/6/21  Ester Denver, MD   therapeutic multivitamin [facility]) tablet Take 1 Tab by mouth daily for 30 days. 5/7/21 6/6/21  Aditi Ribeiro MD   cephALEXin (KEFLEX) 250 mg capsule Take 1 Cap by mouth two (2) times a day for 30 days. 21  Pallavi Ribeiro MD   FeroSul 325 mg (65 mg iron) tablet TAKE 1 TABLET BY MOUTH EVERY MORNING WITH BREAKFAST 21   Nirav Tello MD   ranolazine ER (RANEXA) 500 mg SR tablet TAKE 2 TABLETS BY MOUTH TWICE DAILY 3/16/21   Nirav Tello MD   hydrALAZINE (APRESOLINE) 25 mg tablet Take 4 Tabs by mouth three (3) times daily. 21   Gudelia Limon NP   insulin detemir (LEVEMIR U-100 INSULIN SC) 20 Units by SubCUTAneous route two (2) times a day. Provider, Historical   PARoxetine (PAXIL) 30 mg tablet Take 20 mg by mouth daily. Provider, Historical   albuterol (PROVENTIL HFA, VENTOLIN HFA, PROAIR HFA) 90 mcg/actuation inhaler Take 2 Puffs by inhalation every four (4) hours as needed for Wheezing. 20   Nirav Tello MD   tafamidis (Vyndamax) 61 mg cap Take 61 mg by mouth daily. 20   Tayla Skelton NP   magnesium oxide (MAG-OX) 400 mg tablet Take 1 Tab by mouth daily. 20   Tayla Skelton NP   budesonide-formoteroL (Symbicort) 160-4.5 mcg/actuation HFAA Take 2 Puffs by inhalation two (2) times daily as needed. Provider, Historical       Allergies/Social/Family History: Allergies   Allergen Reactions    Atarax [Hydroxyzine Hcl] Drowsiness    Benzodiazepines Other (comments)     Respiratory issues      Social History     Tobacco Use    Smoking status: Never Smoker    Smokeless tobacco: Never Used   Substance Use Topics    Alcohol use: Not Currently      No family history on file. Review of Systems:     A comprehensive review of systems was negative except for that written in the HPI. Objective:   Vital Signs:  Visit Vitals  /80 Comment: treatment ended.  Blood returned   Pulse 97   Temp 98.3 °F (36.8 °C)   Resp 10   Ht 5' 7\" (1.702 m)   Wt 106 kg (233 lb 11 oz)   SpO2 92%   BMI 36.60 kg/m²    O2 Flow Rate (L/min): 0 l/min O2 Device:  (trilogy with 1L o2) Temp (24hrs), Av.9 °F (36.6 °C), Min:97 °F (36.1 °C), Max:98.3 °F (36.8 °C)    CVP (mmHg): 10 mmHg (05/16/21 0700)      Intake/Output:     Intake/Output Summary (Last 24 hours) at 5/25/2021 1321  Last data filed at 5/25/2021 1138  Gross per 24 hour   Intake 50 ml   Output 832 ml   Net -782 ml       Physical Exam:    General appearance: mild distress, appears older than stated age, moderately obese, confused  Eyes: negative  Nose: Nares normal. Septum midline. Mucosa normal. No drainage or sinus tenderness. Lungs: rhonchi R apex, L apex  Heart: paced   Abdomen: soft, non-tender. Bowel sounds normal. No masses,  no organomegaly, mild distention  Extremities: venous stasis dermatitis noted  Pulses: 2+ and symmetric  Skin: anterior shins with ecchymotic lesions of ventral surfaces bilaterally  Neurologic: MIJARES's, intermittent command following    LABS AND  DATA: Personally reviewed  Recent Labs     05/25/21  0349 05/24/21  0017   WBC 4.7 4.6   HGB 8.6* 8.7*   HCT 28.7* 29.7*   * 129*     Recent Labs     05/25/21  1152 05/25/21  0349 05/24/21  0017    137 134*   K 3.4* 3.3* 3.6    100 99   CO2 27 25 27   BUN 10 27* 22*   CREA 2.27* 5.11* 4.15*   * 121* 119*   CA 8.7 9.2 8.9   MG 2.2 2.4 2.1   PHOS  --  4.8* 3.8     Recent Labs     05/25/21  1152 05/25/21  0349   AP 56 54   TP 7.8 7.5   ALB 3.3* 3.2*   GLOB 4.5* 4.3*     Recent Labs     05/25/21  0349 05/24/21  0017   INR 1.5* 1.3*   PTP 15.8* 13.5*      Recent Labs     05/25/21  1144 05/25/21  0356   PHI 7.37 7.39   PCO2I 47.7* 40.2   PO2I 49* 72*   FIO2I  --  21     No results for input(s): CPK, CKMB, TROIQ, BNPP in the last 72 hours.     Hemodynamics:   PAP:   CO:     Wedge:   CI:     CVP:  CVP (mmHg): 10 mmHg (05/16/21 0700) SVR:       PVR:       Ventilator Settings:  Mode Rate Tidal Volume Pressure FiO2 PEEP   Spontaneous   450 ml  5 cm H2O 21 % 5 cm H20     Peak airway pressure: 25 cm H2O    Minute ventilation: 10.1 l/min        MEDS: Reviewed    Chest X-Ray:  CXR Results  (Last 48 hours) None        Multidisciplinary Rounds Completed: Yes    ABCDEF Bundle/Checklist Completed:  Yes    SPECIAL EQUIPMENT  LVAD    DISPOSITION  Stay in ICU    CRITICAL CARE CONSULTANT NOTE  I had a face to face encounter with the patient, reviewed and interpreted patient data including clinical events, labs, images, vital signs, I/O's, and examined patient. I have discussed the case and the plan and management of the patient's care with the consulting services, the bedside nurses and the respiratory therapist.      NOTE OF PERSONAL INVOLVEMENT IN CARE   This patient has a high probability of imminent, clinically significant deterioration, which requires the highest level of preparedness to intervene urgently. I participated in the decision-making and personally managed or directed the management of the following life and organ supporting interventions that required my frequent assessment to treat or prevent imminent deterioration. I personally spent 85 minutes of critical care time. This is time spent at this critically ill patient's bedside actively involved in patient care as well as the coordination of care. This does not include any procedural time which has been billed separately.     Tere Stinson DO, MS  Staff Intensivist/Anesthesiologist  [facility]  5/25/2021

## 2021-05-25 NOTE — PROGRESS NOTES
ID Progress Note  2021    Subjective:     Not doing any better--worsening respiratory status, actually. Heading for the ICU. Objective:     Antibiotics:  1. Vancomycin   2. Eraxis   3. Zosyn       Vitals:   Visit Vitals  /80 Comment: treatment ended. Blood returned   Pulse 99   Temp 98.3 °F (36.8 °C)   Resp 16   Ht 5' 7\" (1.702 m)   Wt 106 kg (233 lb 11 oz)   SpO2 98%   BMI 36.60 kg/m²        Tmax:  Temp (24hrs), Av °F (36.7 °C), Min:97 °F (36.1 °C), Max:98.3 °F (36.8 °C)      Exam:  Lungs:  clear to auscultation bilaterally  Heart:  regular rate and rhythm  Abdomen:  soft, non-tender. Bowel sounds normal. No masses,  no organomegaly    Labs:      Recent Labs     21  1152 21  0349 21  0017 21  0332   WBC  --  4.7 4.6 4.2   HGB  --  8.6* 8.7* 8.9*   PLT  --  130* 129* 139*   BUN 10 27* 22* 14   CREA 2.27* 5.11* 4.15* 3.14*   AP 56 54 52 54   TBILI 0.6 0.6 0.6 0.7       Cultures:     No results found for: SDES  Lab Results   Component Value Date/Time    Culture result: CANDIDA ALBICANS (A) 2021 11:29 AM    Culture result: RARE DIPHTHEROIDS (A) 2021 04:29 AM    Culture result: LIGHT YEAST, (APPARENT CANDIDA ALBICANS) (A) 2021 07:09 PM    Culture result: LIGHT NORMAL RESPIRATORY BETO 2021 07:09 PM       Radiology:     Line/Insert Date:           Assessment:     1. Chronic SWI  2. Candida UTI--can treat with fluconazole if able  3. LVAD infection  4. Worsening respiratory status     Objective:     1. Monitor off antibiotic therapy  2. Work up fevers  3. Discussed   4.  Grim prognosis    Leana Del Rosario MD

## 2021-05-25 NOTE — PROGRESS NOTES
Responded to RRT. Pt was being attended to by medical staff.    Chaplain Pulido MDiv, MS, [facility]  [phone] ([id number])

## 2021-05-25 NOTE — PROGRESS NOTES
0730:  Bedside shift change report given to Brian Jang RN (oncoming nurse) by Ann Bonilla RN (offgoing nurse). Report included the following information SBAR, Kardex, Procedure Summary, Intake/Output, MAR, and Recent Results. 0800:  Dialysis at bedside. Patient resting quietly in bed on home trilogy. Daughter and sister at bedside. 7509:  PRN hydralazine given for .    1145:  RN called by PCT to inform that the dialysis RN was going to call a RRT on patient. RN entered room, patient lethargic, not responding, tremorous. Dialysis RN and patient's daughter at bedside, informed RN that patient experienced a fixed gaze to the right. Chantale Gonzalez, NP at bedside as well, trilogy mask removed and jay exam attempted. Mask off for 2-4 minutes and then replaced. ABG completed by RT. Labs drawn and sent. Request made for transfer to ICU. Orders received based on ABG results. 1228: Intensivist at bedside. 1415:  Post rapid response ABG complete. TRANSFER - OUT REPORT:    Verbal report given to Renetta Mejía RN(name) on Juan Monroy  being transferred to [facility](unit) for change in patient condition(AMS/Respiratory Distress)       Report consisted of patients Situation, Background, Assessment and   Recommendations(SBAR). Information from the following report(s) SBAR, Kardex, Procedure Summary, Intake/Output, MAR and Recent Results was reviewed with the receiving nurse. Lines:   Quad Lumen 05/11/21 Anterior; Left Neck (Active)   Central Line Being Utilized Yes 05/25/21 2833   Criteria for Appropriate Use Limited/no vessel suitable for conventional peripheral access 05/25/21 0814   Site Assessment Clean, dry, & intact 05/25/21 0814   Infiltration Assessment 0 05/25/21 0814   Affected Extremity/Extremities Color distal to insertion site pink (or appropriate for race) 05/25/21 0814   Date of Last Dressing Change 05/24/21 05/25/21 0814   Dressing Status Clean, dry, & intact 05/25/21 0814   Dressing Type Disk with Chlorhexadine gluconate (CHG); Stabilization/securement device;Transparent 05/25/21 6296   Action Taken Open ports on tubing capped 05/24/21 2100   Proximal Hub Color/Line Status Blue;Capped 05/25/21 0814   Positive Blood Return (Medial Site) Yes 05/24/21 0938   Medial 1 Hub Color/Line Status White;Capped 05/25/21 0814   Positive Blood Return (Lateral Site) Yes 05/24/21 0938   Medial 2 Hub Color/Line Status Gray;Capped 05/25/21 0814   Positive Blood Return (Site #3) Yes 05/24/21 0938   Distal Hub Color/Line Status Brown;Capped 05/25/21 0814   Positive Blood Return (Site #4) Yes 05/24/21 0938   Alcohol Cap Used Yes 05/25/21 0814        Opportunity for questions and clarification was provided. Patient transported with:   Monitor  Registered Nurse  Tech              Problem: Pressure Injury - Risk of  Goal: *Prevention of pressure injury  Description: Document Jaime Scale and appropriate interventions in the flowsheet. Outcome: Progressing Towards Goal  Note: Pressure Injury Interventions:  Sensory Interventions: Assess changes in LOC, Assess need for specialty bed    Moisture Interventions: Absorbent underpads, Apply protective barrier, creams and emollients    Activity Interventions: Increase time out of bed, Pressure redistribution bed/mattress(bed type)    Mobility Interventions: HOB 30 degrees or less, Pressure redistribution bed/mattress (bed type)    Nutrition Interventions: Document food/fluid/supplement intake, Discuss nutritional consult with provider, Offer support with meals,snacks and hydration    Friction and Shear Interventions: HOB 30 degrees or less                Problem: Falls - Risk of  Goal: *Absence of Falls  Description: Document Godfrey Fall Risk and appropriate interventions in the flowsheet.   Outcome: Progressing Towards Goal  Note: Fall Risk Interventions:  Mobility Interventions: Bed/chair exit alarm, Patient to call before getting OOB    Mentation Interventions: Bed/chair exit alarm, Adequate sleep, hydration, pain control, Door open when patient unattended, Family/sitter at bedside    Medication Interventions: Teach patient to arise slowly, Utilize gait belt for transfers/ambulation, Patient to call before getting OOB    Elimination Interventions: Call light in reach, Patient to call for help with toileting needs    History of Falls Interventions: Consult care management for discharge planning, Bed/chair exit alarm, Door open when patient unattended, Evaluate medications/consider consulting pharmacy, Investigate reason for fall, Room close to nurse's station, Utilize gait belt for transfer/ambulation, Assess for delayed presentation/identification of injury for 48 hrs (comment for end date), Vital signs minimum Q4HRs X 24 hrs (comment for end date)         Problem: Breathing Pattern - Ineffective  Goal: *Use of effective breathing techniques  Outcome: Progressing Towards Goal     Problem: High Risk or History of JED  Goal: Maintain Patent Airway and Adequate Oxygenation  Outcome: Progressing Towards Goal

## 2021-05-25 NOTE — PROGRESS NOTES
1440: TRANSFER - IN REPORT:    Verbal report received from [facility](name) on Ayanna De La O  being received from [facility](unit) for change in patient condition(decreasing oxygen saturation)      Report consisted of patients Situation, Background, Assessment and   Recommendations(SBAR). Information from the following report(s) SBAR, OR Summary, Intake/Output, MAR, Recent Results, Cardiac Rhythm Paced and Alarm Parameters  was reviewed with the receiving nurse. Opportunity for questions and clarification was provided. Assessment completed upon patients arrival to unit and care assumed. Pt. Not alert, opens eyes intermittently to voice. Precedex gtt started. LVAD speeds 9400                         9000  1509: Pt. Experiencing what looked like a seizure lasting for about 30 sec. Terminated without intervention. Dr. Leif Madrigal at bedside. Precedex gtt increased. 1530: EEG at bedside. Unable to give pt. PO medication due to inability to tolerate triology mask being taken off. Pt. Will desat into the 70's. Also not alert enough to swallow. Dr. Leif Madrigal notified. No new orders at this time. Will address in morning. 2000: Bedside and Verbal shift change report given to Grayson Lacey, RN/PADILLA Brown (oncoming nurse) by Ruth Calderón RN (offgoing nurse). Report included the following information SBAR, Kardex, Procedure Summary, Intake/Output, MAR, Recent Results, Cardiac Rhythm paced and Alarm Parameters .

## 2021-05-25 NOTE — PROGRESS NOTES
[facility] Adult  Hospitalist Group     ICU Transfer/Accept Summary     This patient is being transferred INTO THE ICU  DATE OF TRANSFER: 5/25/2021       PATIENT ID: Hayley Hernandez  MRN: [id number]   YOB: 1950    PRIMARY CARE PROVIDER: Khoa Domingo NP   DATE OF ADMISSION: 5/11/2021  1:26 AM    ATTENDING PHYSICIAN: Brian Mane MD  CONSULTATIONS:   IP CONSULT TO ADVANCED HEART FAILURE  IP CONSULT TO ADVANCED HEART FAILURE  IP CONSULT TO INFECTIOUS DISEASES  IP CONSULT TO ELECTROPHYSIOLOGY  IP CONSULT TO NEUROLOGY  IP CONSULT TO INTENSIVIST  IP CONSULT TO PALLIATIVE CARE - PROVIDER    PROCEDURES/SURGERIES:   * No surgery found *    REASON FOR ADMISSION: Acute encephalopathy     HOSPITAL PROBLEM LIST:  Patient Active Problem List   Diagnosis Code    Obesity, morbid ([facility] [address] E66.01    Acute on chronic systolic CHF (congestive heart failure) ([facility]) I50.23    NICM (nonischemic cardiomyopathy) ([facility]) I42.8    Coronary artery disease involving native coronary artery of native heart without angina pectoris I25.10    JED on CPAP G47.33, Z99.89    Chronic venous insufficiency I87.2    Ulcer of right foot, limited to breakdown of skin ([facility]) L97.521    NSVT (nonsustained ventricular tachycardia) ([facility]) I47.2    Anemia D64.9    ORCIO (acute kidney injury) ([facility] [address] N17.9    Coagulopathy ([facility] [address] D68.9    Open wound of left lower leg S81.802A    Incontinence in female R32    LVAD (left ventricular assist device) present [facility]) Z95.811   [facility] discharge follow-up Z09    ICD (implantable cardioverter-defibrillator) battery depletion Z45.02    HAIRSTON (dyspnea on exertion) R06.00    Dyspnea R06.00    Acute encephalopathy G93.40    Severe sepsis with acute organ dysfunction ([facility]) A41.9, R65.20         Brief HPI and Hospital Course: This is a 77-year-old female who has advanced at heart failure with ventricular assist device support is admitted for dyspnea.   She is found to have sepsis on further work-up. Patient completed treatment for sepsis. She was awaiting dialysis chair for discharge. Mental status worsened over the last 24 hours and she is now being transferred to the ICU, 5/25       #Acute metabolic encephalopathy, worsening myoclonic activities. Etiology is definitely multifactorial, suspect this is primarily metabolic. Neurologic exam is nonfocal.  Noncontrast head CT was negative. Ammonia was low, 10. ABG with mild hypoxia but no CO2 retention. Will repeat metabolic panel, magnesium. EKG requested. Discussed with neurologist.  -Patient with multiple significant comorbidities including respiratory failure requiring BiPAP, advanced heart failure on LVAD and ESRD on dialysis now with worsening mental status. She definitely is close monitoring at the higher setting, she has been transferred to the ICU.     #CAD, advanced heart failure, chronic systolic and diastolic CHF: BiVentricular , s/p LVAD, s/p ICD.    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 Resolved. On room air  - extubated. doing well on her home trilogy ventilator at night.     #ROCIO on CKD stage 4: progressive CKD and CRS- started HD 4/26  - Nephro following: R- IJ permacath in situ- OP HD set up at [place]      #Sepsis likely source chronic surgical wound infection. Resolved. -Now back on home Keflex now.     #Candida UTI: Continue Eraxis. ID help appreciated.     #Gross tremors. Work-up in prior admissions unremarkable. Cardiology team suggesting this could be due to amyloidosis.          #.  H/o breast cancer (1992) - s/p bilateral mastectomy/chemo   #. Lymphedema of LLE due to cancer treatment  #. COPD: no acute exacerbation. FEV1 50%  #. AFib: AC as above   #. Hyperkalemia, resolved  #. Pulmonary hypertension  #. Morbid obesity, BMI 42.29 kg/m². #. DM2: A1c 7.2, SSI, AccuChecks, monitor  #. JED on Trilogy  #. Urinary incontinence, severe  #. Endometrial cancer (2002)- s/p Hysterectomy  #. HTN: chronic, stable, Home regimen, PRN BP meds. Monitor  #. HLD: chronic, Stable, Home regimen  #Abdominal distention with gas pain: As needed simethicone. KUB with: Gaseous distention. -DC Colace  -Add probiotics and as needed Imodium.         PHYSICAL EXAMINATION:  Visit Vitals  /80 Comment: treatment ended. Blood returned   Pulse 99   Temp 99.3 °F (37.4 °C)   Resp 16   Ht 5' 7\" (1.702 m)   Wt 106 kg (233 lb 11 oz)   SpO2 98%   BMI 36.60 kg/m²     General:   Lethargic and minimally responsive. Card:  humming VAD sound. good peripheral perfusion  Resp:  No accessory muscle use, fair AE, no wheezes. no crepitations  Abd:   Distended, normoactive, nontender. Extremities:   No edema. No cyanosis or clubbing, edema present. . Venous insufficiency discoloration. Neuro:   Lethargic, minimally responsive.   Psych:  Good insight, not agitated.       Labs:     Recent Labs     05/25/21  0349 05/24/21  0017   WBC 4.7 4.6   HGB 8.6* 8.7*   HCT 28.7* 29.7*   * 129*     Recent Labs     05/25/21  1152 05/25/21  0349 05/24/21  0017    137 134*   K 3.4* 3.3* 3.6    100 99   CO2 27 25 27   BUN 10 27* 22*   CREA 2.27* 5.11* 4.15*   * 121* 119*   CA 8.7 9.2 8.9   MG 2.2 2.4 2.1   PHOS  --  4.8* 3.8     Recent Labs     05/25/21  1152 05/25/21  0349 05/24/21  0017   ALT 7* 8* 7*   AP 56 54 52   TBILI 0.6 0.6 0.6   TP 7.8 7.5 7.4   ALB 3.3* 3.2* 3.0*   GLOB 4.5* 4.3* 4.4*     Recent Labs     05/25/21  0349 05/24/21  0017 05/23/21  0332   INR 1.5* 1.3* 1.3*   PTP 15.8* 13.5* 13.8*      No results for input(s): FE, TIBC, PSAT, FERR in the last 72 hours. Lab Results   Component Value Date/Time    Folate 10.0 10/28/2020 03:56 AM      No results for input(s): PH, PCO2, PO2 in the last 72 hours. No results for input(s): CPK, CKNDX, TROIQ in the last 72 hours. No lab exists for component: CPKMB  Lab Results   Component Value Date/Time    Cholesterol, total 129 04/16/2021 10:40 AM    HDL Cholesterol 33 04/16/2021 10:40 AM    LDL, calculated 74 04/16/2021 10:40 AM    Triglyceride 110 04/16/2021 10:40 AM    CHOL/HDL Ratio 3.9 04/16/2021 10:40 AM     Lab Results   Component Value Date/Time    Glucose (POC) 124 (H) 05/25/2021 12:01 PM    Glucose (POC) 156 (H) 05/25/2021 06:44 AM    Glucose (POC) 119 (H) 05/25/2021 03:40 AM    Glucose (POC) 128 (H) 05/24/2021 09:24 PM    Glucose (POC) 120 (H) 05/24/2021 05:00 PM     Lab Results   Component Value Date/Time    Color DARK YELLOW 05/22/2021 05:30 AM    Appearance CLOUDY (A) 05/22/2021 05:30 AM    Specific gravity 1.023 05/22/2021 05:30 AM    Specific gravity 1.013 11/09/2020 12:05 AM    pH (UA) 5.0 05/22/2021 05:30 AM    Protein 100 (A) 05/22/2021 05:30 AM    Glucose Negative 05/22/2021 05:30 AM    Ketone Negative 05/22/2021 05:30 AM    Bilirubin Negative 04/18/2021 05:08 PM    Urobilinogen 0.2 05/22/2021 05:30 AM    Nitrites Positive (A) 05/22/2021 05:30 AM    Leukocyte Esterase SMALL (A) 05/22/2021 05:30 AM    Epithelial cells FEW 05/22/2021 05:30 AM    Bacteria 1+ (A) 05/22/2021 05:30 AM    WBC 0-4 05/22/2021 05:30 AM    RBC 0-5 05/22/2021 05:30 AM         CODE STATUS:  x Full Code    DNR    Partial    Comfort Care       Signed:    Escobar Mendez MD  Date of Service:  5/25/2021  3:36 PM

## 2021-05-25 NOTE — PROGRESS NOTES
.  [facility] VAD/Heart Failure Progress Note    Admit Date: 2021  POD:  * No surgery found *    Procedure:          Subjective:   Not very responsive; very hypoxic with PO2 40's; on HD; not really moving extremities; may need re-intubation; discussed with ICU and HF teams      Objective:   Vitals:  Blood pressure 100/80, pulse 99, temperature 98.3 °F (36.8 °C), resp. rate 10, height 5' 7\" (1.702 m), weight 233 lb 11 oz (106 kg), SpO2 (!) 84 %. Temp (24hrs), Av.9 °F (36.6 °C), Min:97 °F (36.1 °C), Max:98.3 °F (36.8 °C)    Hemodynamics:   CO:     CI:     CVP: CVP (mmHg): 10 mmHg (21 0700)   SVR:     PAP Systolic:     PAP Diastolic:     PVR:     KJ20:     SCV02:      VAD Interrogation: LVAD (Heartmate)  Pump Speed (RPM): 9400  Pump Flow (LPM): 5.2  Chatter in Lines: No  PI (Pulsitility Index): 6  Power: 5.2  MAP: 90   Test: Yes  Back Up  at Bedside & Labeled: Yes  Power Module Test: Yes  Driveline Site Care: No  Driveline Dressing: Clean, Dry, and Intact    EKG/Rhythm:      Extubation Date / Time:     CT Output:     Ventilator:  Ventilator Volumes  Vt Set (ml): 450 ml (21 0750)  Vt Exhaled (Machine Breath) (ml): 473 ml (21 0750)  Vt Spont (ml): 520 ml (21 0235)  Ve Observed (l/min): 10.1 l/min (21 0235)    Oxygen Therapy:  Oxygen Therapy  O2 Sat (%): (!) 84 % (21 1145)  Pulse via Oximetry: 98 beats per minute (21 0713)  O2 Device:  (trilogy with 1L o2) (21 1145)  Skin Assessment: Clean, dry, & intact (21 9912)  Skin Protection for O2 Device: No (21)  O2 Flow Rate (L/min): 0 l/min (21)  O2 Temperature:  (HME) (21 0328)  FIO2 (%): 21 % (21)  ETCO2 (mmHg): 36 mmHg (21 0200)    CXR:    Admission Weight: Last Weight   Weight: 269 lb 10 oz (122.3 kg) Weight: 233 lb 11 oz (106 kg)     Intake / Output / Drain:  Current Shift: No intake/output data recorded.   Last 24 hrs.:     Intake/Output Summary (Last 24 hours) at 5/25/2021 1220  Last data filed at 5/25/2021 0814  Gross per 24 hour   Intake 50 ml   Output 0 ml   Net 50 ml           No results for input(s): CPK, CKMB, TROIQ in the last 72 hours.   Recent Labs     05/25/21  0349 05/24/21  0017 05/23/21  0332    134* 136   K 3.3* 3.6 3.8   CO2 25 27 27   BUN 27* 22* 14   CREA 5.11* 4.15* 3.14*   * 119* 112*   PHOS 4.8* 3.8  --    MG 2.4 2.1  --    WBC 4.7 4.6 4.2   HGB 8.6* 8.7* 8.9*   HCT 28.7* 29.7* 29.8*   * 129* 139*     Recent Labs     05/25/21 0349 05/24/21  0017 05/23/21  0332   INR 1.5* 1.3* 1.3*   PTP 15.8* 13.5* 13.8*     No lab exists for component: PBNP        Current Facility-Administered Medications:     warfarin (COUMADIN) tablet 4 mg, 4 mg, Oral, ONCE, Rosemarie Ward MD    isosorbide mononitrate ER (IMDUR) tablet 30 mg, 30 mg, Oral, BID, Brown Pina MD, 30 mg at 05/24/21 1748    L.acidophilus-paracasei-S.thermophil-bifidobacter (RISAQUAD) 8 billion cell capsule, 1 Capsule, Oral, DAILY, JERRI Ward MD, 1 Capsule at 05/24/21 0955    loperamide (IMODIUM) capsule 2 mg, 2 mg, Oral, Q4H PRN, SAMIR Ward MD    albuterol-ipratropium (DUO-NEB) 2.5 MG-0.5 MG/3 ML, 3 mL, Nebulization, BID RT, Brown Pina MD, 3 mL at 05/24/21 0713    simethicone (MYLICON) tablet 80 mg, 80 mg, Oral, QID PRN, DANA Ward MD    epoetin amalia-epbx (RETACRIT) injection 20,000 Units, 20,000 Units, SubCUTAneous, Q TUE, THU & SAT, Rich Adams MD, 20,000 Units at 05/22/21 2038    artificial saliva (MOUTH KOTE) 1 Spray, 1 Spray, Oral, PRN, Braxton, Triny B, NP    cephALEXin (KEFLEX) capsule 250 mg, 250 mg, Oral, BID, Braxton, Triny B, NP, 250 mg at 05/24/21 2120    PARoxetine (PAXIL) tablet 20 mg, 20 mg, Oral, DAILY, [place], MD Festus, 20 mg at 05/24/21 0956    hydrocortisone (CORTAID) 1 % cream, , Topical, PRN, Phares Bosworth, MD, Given at 05/24/21 1746    glucose chewable tablet 16 g, 4 Tablet, Oral, PRN, Elda Pineda MD    dextrose (D50W) injection syrg 12.5-25 g, 25-50 mL, IntraVENous, PRN, Elda Pineda MD    glucagon [facility]) injection 1 mg, 1 mg, IntraMUSCular, PRN, Elda Pineda MD    insulin lispro (HUMALOG) injection, , SubCUTAneous, AC&HS, Samantha Pereira MD, 2 Units at 05/24/21 0956    famotidine (PEPCID) tablet 20 mg, 20 mg, Oral, DAILY, Yarelis Cornelius MD, 20 mg at 05/24/21 0955    benzocaine-zinc cl-benzalkonium cl (ORAJEL) 20-0.1-0.02 % mucosal gel, , Oral, PRN, Yarelis Cornelius MD    magic mouthwash cpd (without sucralfate), 5 mL, Oral, DAILY PRN, Yarelis Cornelius MD, 5 mL at 05/19/21 1507    balsam peru-castor oiL (VENELEX) ointment, , Topical, BID, Yarelis Cornelius MD, Given at 05/25/21 0826    albumin human 25% (BUMINATE) solution 50 g, 50 g, IntraVENous, DIALYSIS PRN, Jaspreet Mendoza MD    heparin (porcine) 1,000 unit/mL injection 1,900 Units, 1,900 Units, InterCATHeter, DIALYSIS PRN, 1,900 Units at 05/25/21 1045 **AND** heparin (porcine) 1,000 unit/mL injection 1,900 Units, 1,900 Units, InterCATHeter, DIALYSIS PRN, Liliya Lamb MD, 1,900 Units at 05/25/21 1044    hydrALAZINE (APRESOLINE) 20 mg/mL injection 20 mg, 20 mg, IntraVENous, Q6H PRN, Braxton, Triny B, NP, 20 mg at 05/24/21 0010    hydrALAZINE (APRESOLINE) 20 mg/mL injection 10 mg, 10 mg, IntraVENous, Q6H PRN, Braxton, Triny B, NP, 10 mg at 05/25/21 9744    ranolazine ER (RANEXA) tablet 1,000 mg, 1,000 mg, Oral, BID, Braxton, Triny B, NP, 1,000 mg at 05/24/21 2121    hydrALAZINE (APRESOLINE) tablet 100 mg, 100 mg, Oral, TID, Braxton, Triny B, NP, 100 mg at 05/24/21 1748    tafamidis cap 61 mg (Patient Supplied), 61 mg, Oral, DAILY, Yarelis Cornelius MD, 61 mg at 05/24/21 0955    ondansetron (ZOFRAN) injection 4 mg, 4 mg, IntraVENous, Q4H PRN, Valeriy Morrow MD, 4 mg at 05/23/21 1216    zinc oxide-cod liver oil (DESITIN) 40 % paste, , Topical, Q12H, Dustin Antunez, Zacarias Miller MD, Given at 05/25/21 4930    sodium chloride (NS) flush 5-10 mL, 5-10 mL, IntraVENous, PRN, Thao Borrero MD    sodium chloride (NS) flush 5-40 mL, 5-40 mL, IntraVENous, Q8H, Lili Tan MD, 10 mL at 05/25/21 0645    sodium chloride (NS) flush 5-40 mL, 5-40 mL, IntraVENous, PRN, Lili Tan MD    acetaminophen (TYLENOL) tablet 650 mg, 650 mg, Oral, Q6H PRN **OR** acetaminophen (TYLENOL) suppository 650 mg, 650 mg, Rectal, Q6H PRN, Lili Tan MD    Warfarin - Pharmacy to Dose, , Other, Rx Dosing/Monitoring, Dillon Walter NP    sodium chloride (NS) flush 5-40 mL, 5-40 mL, IntraVENous, Q8H, Braxton, Triny B, NP, 10 mL at 05/25/21 0645    sodium chloride (NS) flush 5-40 mL, 5-40 mL, IntraVENous, PRN, Braxton, Triny B, NP, 10 mL at 05/23/21 1216    nystatin (MYCOSTATIN) 100,000 unit/gram powder, , Topical, PRN, Sheri Bell MD    A/P  S/P LVAD - good flows  Renal failure - HD  Urosepsis - Abx's  Need for anticoagulation - coumadin      Risk of morbidity and mortality - high  Medical decision making - high complexity    Signed By: Maddy Gale MD

## 2021-05-25 NOTE — CONSULTS
Neurology  Consult  Tao Paul FNP-C  Neurology NP  [phone]    Patient: Royal Valadez MRN: [id number]  SSN: [id number]    YOB: 1950  Age: 78 y.o. Sex: female        Chief Complaint:    Subjective:      Royal Valadez is a 78 y.o. female with a HX for advanced heart failure with LVAD, diabetes and neuropathy, renal dysfunction, and hypertension She had previously been seen by neurology for tremors caused by metabolic causes. On 5/6/2021, she was released from [facility] after being moved from a long-term care facility for acute encephalopathy and hypoxic respiratory failure. This time, she was hospitalized for sepsis and acute respiratory failure. Neurology was consulted due to her confusion and increased tremors. Daughter seems to have stated overnight that she does not tolerate Atarax and has had similar reactions in the past; she is convinced that her somnolence and increased tremors are the result of this. Past Medical History:   Diagnosis Date    Asthma     Cancer ([address]     breast    Cancer ([address]     endometrial    Congestive heart failure, unspecified     CRI (chronic renal insufficiency)     Depression     Diabetes ([address]     Hypertension     Severe sepsis with acute organ dysfunction ([address] 5/11/2021     Past Surgical History:   Procedure Laterality Date    HX HERNIA REPAIR      HX HYSTERECTOMY      HX MASTECTOMY      IR INSERT NON TUNL CVC OVER 5 YRS  4/26/2021    IR INSERT TUNL CVC W/O PORT OVER 5 YR  5/5/2021      No family history on file.   Social History     Tobacco Use    Smoking status: Never Smoker    Smokeless tobacco: Never Used   Substance Use Topics    Alcohol use: Not Currently      Current Facility-Administered Medications   Medication Dose Route Frequency Provider Last Rate Last Admin    warfarin (COUMADIN) tablet 4 mg  4 mg Oral ONCE THUAN Ward MD        dexmedeTOMidine in 0.9 % NaCl (PRECEDEX) 400 mcg/100 mL (4 mcg/mL) infusion soln 0.1-1.5 mcg/kg/hr IntraVENous TITRATE Migel Lynn DO 8 mL/hr at 05/25/21 1443 0.3 mcg/kg/hr at 05/25/21 1443    isosorbide mononitrate ER (IMDUR) tablet 30 mg  30 mg Oral BID Toribio Goodpasture, MD   30 mg at 05/24/21 1748    L.acidophilus-paracasei-S.thermophil-bifidobacter (RISAQUAD) 8 billion cell capsule  1 Capsule Oral DAILY ISABEL Ward MD   1 Capsule at 05/24/21 0955    loperamide (IMODIUM) capsule 2 mg  2 mg Oral Q4H PRN Rosemarie Ward MD        albuterol-ipratropium (DUO-NEB) 2.5 MG-0.5 MG/3 ML  3 mL Nebulization BID RT Toribio Goodpasture, MD   3 mL at 05/24/21 0713    simethicone (MYLICON) tablet 80 mg  80 mg Oral QID PRN Rosemarie aWrd MD        epoetin amalia-epbx (RETACRIT) injection 20,000 Units  20,000 Units SubCUTAneous Q TUE, THU & SAT eDa Win MD   20,000 Units at 05/22/21 2038    artificial saliva (MOUTH KOTE) 1 Spray  1 Spray Oral PRN Lysle Sages B, NP        cephALEXin (KEFLEX) capsule 250 mg  250 mg Oral BID Braxton, Triny B, NP   250 mg at 05/24/21 2120    PARoxetine (PAXIL) tablet 20 mg  20 mg Oral DAILY Festus Ribeiro MD   20 mg at 05/24/21 0956    hydrocortisone (CORTAID) 1 % cream   Topical PRN Aries Ribeiro MD   Given at 05/24/21 1746    glucose chewable tablet 16 g  4 Tablet Oral PRN Amber Thorpe MD        dextrose (D50W) injection syrg 12.5-25 g  25-50 mL IntraVENous PRN Amber Thorpe MD        glucagon [facility]) injection 1 mg  1 mg IntraMUSCular PRN Elda Pineda MD        insulin lispro (HUMALOG) injection   SubCUTAneous AC&HS Amber Thorpe MD   2 Units at 05/24/21 0956    famotidine (PEPCID) tablet 20 mg  20 mg Oral DAILY Toribio Goodpasture, MD   20 mg at 05/24/21 8857    benzocaine-zinc cl-benzalkonium cl (ORAJEL) 20-0.1-0.02 % mucosal gel   Oral PRN Toribio Goodpasture, MD        magic mouthwash cpd (without sucralfate)  5 mL Oral DAILY PRN Toribio Goodpasture, MD   5 mL at 05/19/21 7742    balsam peru-castor oiL (VENELEX) ointment   Topical BID Elena Rojo MD   Given at 05/25/21 5707    albumin human 25% (BUMINATE) solution 50 g  50 g IntraVENous DIALYSIS PRN Colton Mendoza MD        heparin (porcine) 1,000 unit/mL injection 1,900 Units  1,900 Units InterCATHeter DIALYSIS PRN Tarah Joseph MD   1,900 Units at 05/25/21 1045    And    heparin (porcine) 1,000 unit/mL injection 1,900 Units  1,900 Units InterCATHeter DIALYSIS PRN Tarah Joseph MD   1,900 Units at 05/25/21 1044    hydrALAZINE (APRESOLINE) 20 mg/mL injection 20 mg  20 mg IntraVENous Q6H PRN Braxton, Triny B, NP   20 mg at 05/24/21 0010    hydrALAZINE (APRESOLINE) 20 mg/mL injection 10 mg  10 mg IntraVENous Q6H PRN Raphael Art B, NP   10 mg at 05/25/21 0955    ranolazine ER (RANEXA) tablet 1,000 mg  1,000 mg Oral BID Raphael Art B, NP   1,000 mg at 05/24/21 2121    hydrALAZINE (APRESOLINE) tablet 100 mg  100 mg Oral TID Raphael Art B, NP   100 mg at 05/24/21 1748    tafamidis cap 61 mg (Patient Supplied)  61 mg Oral DAILY Elena Rojo MD   61 mg at 05/24/21 0955    ondansetron (ZOFRAN) injection 4 mg  4 mg IntraVENous Q4H PRN Valeriy Morrow MD   4 mg at 05/23/21 1216    zinc oxide-cod liver oil (DESITIN) 40 % paste   Topical Q12H Cheryl Iqbal MD   Given at 05/25/21 0828    sodium chloride (NS) flush 5-10 mL  5-10 mL IntraVENous PRN Amarjit White MD        sodium chloride (NS) flush 5-40 mL  5-40 mL IntraVENous Q8H Mariana Barrett MD   10 mL at 05/25/21 1405    sodium chloride (NS) flush 5-40 mL  5-40 mL IntraVENous PRN Mariana Barrett MD        acetaminophen (TYLENOL) tablet 650 mg  650 mg Oral Q6H PRN Mariana Barrett MD        Or   Mohan acetaminophen (TYLENOL) suppository 650 mg  650 mg Rectal Q6H PRN Mariana Barrett MD        Warfarin - Pharmacy to Dose   Other Rx Dosing/Monitoring Severiano Blind, TEA        sodium chloride (NS) flush 5-40 mL  5-40 mL IntraVENous Q8H Triny Limon NP   10 mL at 05/25/21 1405    sodium chloride (NS) flush 5-40 mL  5-40 mL IntraVENous PRN Triny Limon, NP   10 mL at 05/23/21 1216    nystatin (MYCOSTATIN) 100,000 unit/gram powder   Topical PRN See Serna MD            Allergies   Allergen Reactions    Atarax [Hydroxyzine Hcl] Drowsiness    Benzodiazepines Other (comments)     Respiratory issues       Review of Systems:  Unable to obtain       Objective:     Vitals:    05/25/21 1145 05/25/21 1219 05/25/21 1341 05/25/21 1440   Pulse: 99 97 99    Resp: 10  16    Temp: 98.3 °F (36.8 °C)  98.3 °F (36.8 °C) 99.3 °F (37.4 °C)   TempSrc:       SpO2: (!) 84% 92% 98%    Weight:       Height:            Physical Exam:  GENERAL: mild distress, appears stated age, syndromic appearance      Neurologic Exam:  Mental Status:  Alert and oriented to self   CPAP on difficulty to understand     Language:        Cranial Nerves:   Normal appearing fundi, sharp discs. Visual fields full to confrontation. Extraocular movements around the room,      Facial sensation intact. Full facial strength, no asymmetry. Hearing intact bilaterally. symmetrically. Shoulder shrug 5/5 bilaterally. Motor: Tenzin Kick       Move all extremities      Myoclonic jerks with movement upper extremities with asterixis noted at the hands, multifocal myoclonus affecting the biceps and deltoid frequently    Sensation:        Reflexes:        Coordination & Gait: Deferred     Recent Results (from the past 24 hour(s))   GLUCOSE, POC    Collection Time: 05/24/21  5:00 PM   Result Value Ref Range    Glucose (POC) 120 (H) 65 - 117 mg/dL    Performed by [facility]    GLUCOSE, POC    Collection Time: 05/24/21  9:24 PM   Result Value Ref Range    Glucose (POC) 128 (H) 65 - 117 mg/dL    Performed by SHA GARDNER    GLUCOSE, POC    Collection Time: 05/25/21  3:40 AM   Result Value Ref Range    Glucose (POC) 119 (H) 65 - 117 mg/dL    Performed by [address] [place] [address] + INR    Collection Time: 05/25/21  3:49 AM   Result Value Ref Range    INR 1.5 (H) 0.9 - 1.1      Prothrombin time 15.8 (H) 9.0 - 11.1 sec   LD    Collection Time: 05/25/21  3:49 AM   Result Value Ref Range     81 - 246 U/L   CBC WITH AUTOMATED DIFF    Collection Time: 05/25/21  3:49 AM   Result Value Ref Range    WBC 4.7 3.6 - 11.0 K/uL    RBC 3.12 (L) 3.80 - 5.20 M/uL    HGB 8.6 (L) 11.5 - 16.0 g/dL    HCT 28.7 (L) 35.0 - 47.0 %    MCV 92.0 80.0 - 99.0 FL    MCH 27.6 26.0 - 34.0 PG    MCHC 30.0 30.0 - 36.5 g/dL    RDW 19.9 (H) 11.5 - 14.5 %    PLATELET 377 (L) 649 - 400 K/uL    MPV 8.9 8.9 - 12.9 FL    NRBC 0.0 0  WBC    ABSOLUTE NRBC 0.00 0.00 - 0.01 K/uL    NEUTROPHILS 79 (H) 32 - 75 %    LYMPHOCYTES 11 (L) 12 - 49 %    MONOCYTES 9 5 - 13 %    EOSINOPHILS 0 0 - 7 %    BASOPHILS 1 0 - 1 %    IMMATURE GRANULOCYTES 0 0.0 - 0.5 %    ABS. NEUTROPHILS 3.8 1.8 - 8.0 K/UL    ABS. LYMPHOCYTES 0.5 (L) 0.8 - 3.5 K/UL    ABS. MONOCYTES 0.4 0.0 - 1.0 K/UL    ABS. EOSINOPHILS 0.0 0.0 - 0.4 K/UL    ABS. BASOPHILS 0.0 0.0 - 0.1 K/UL    ABS. IMM. GRANS. 0.0 0.00 - 0.04 K/UL    DF SMEAR SCANNED      RBC COMMENTS ANISOCYTOSIS  1+        RBC COMMENTS TEARDROP CELLS  PRESENT        RBC COMMENTS OVALOCYTES  1+        RBC COMMENTS MICROCYTOSIS  1+       METABOLIC PANEL, COMPREHENSIVE    Collection Time: 05/25/21  3:49 AM   Result Value Ref Range    Sodium 137 136 - 145 mmol/L    Potassium 3.3 (L) 3.5 - 5.1 mmol/L    Chloride 100 97 - 108 mmol/L    CO2 25 21 - 32 mmol/L    Anion gap 12 5 - 15 mmol/L    Glucose 121 (H) 65 - 100 mg/dL    BUN 27 (H) 6 - 20 MG/DL    Creatinine 5.11 (H) 0.55 - 1.02 MG/DL    BUN/Creatinine ratio 5 (L) 12 - 20      GFR est AA 10 (L) >60 ml/min/1.73m2    GFR est non-AA 8 (L) >60 ml/min/1.73m2    Calcium 9.2 8.5 - 10.1 MG/DL    Bilirubin, total 0.6 0.2 - 1.0 MG/DL    ALT (SGPT) 8 (L) 12 - 78 U/L    AST (SGOT) 16 15 - 37 U/L    Alk.  phosphatase 54 45 - 117 U/L    Protein, total 7.5 6.4 - 8.2 g/dL    Albumin 3.2 (L) 3.5 - 5.0 g/dL    Globulin 4.3 (H) 2.0 - 4.0 g/dL    A-G Ratio 0.7 (L) 1.1 - 2.2     MAGNESIUM    Collection Time: 05/25/21  3:49 AM   Result Value Ref Range    Magnesium 2.4 1.6 - 2.4 mg/dL   PHOSPHORUS    Collection Time: 05/25/21  3:49 AM   Result Value Ref Range    Phosphorus 4.8 (H) 2.6 - 4.7 MG/DL   POC EG7    Collection Time: 05/25/21  3:56 AM   Result Value Ref Range    Calcium, ionized (POC) 1.14 1.12 - 1.32 mmol/L    FIO2 (POC) 21 %    pH (POC) 7.39 7.35 - 7.45      pCO2 (POC) 40.2 35.0 - 45.0 MMHG    pO2 (POC) 72 (L) 80 - 100 MMHG    HCO3 (POC) 24.3 22 - 26 MMOL/L    Base deficit (POC) 0.7 mmol/L    sO2 (POC) 94.1 92 - 97 %    Site LEFT RADIAL      Device: CPAP      Mode Pressure Support      PEEP/CPAP (POC) 10 cmH2O    Allens test (POC) Positive      Specimen type (POC) ARTERIAL     AMMONIA    Collection Time: 05/25/21  4:29 AM   Result Value Ref Range    Ammonia <10 <32 UMOL/L   GLUCOSE, POC    Collection Time: 05/25/21  6:44 AM   Result Value Ref Range    Glucose (POC) 156 (H) 65 - 117 mg/dL    Performed by Samantha Manriquez    POC EG7    Collection Time: 05/25/21 11:44 AM   Result Value Ref Range    Calcium, ionized (POC) 1.13 1.12 - 1.32 mmol/L    pH (POC) 7.37 7.35 - 7.45      pCO2 (POC) 47.7 (H) 35.0 - 45.0 MMHG    pO2 (POC) 49 (LL) 80 - 100 MMHG    HCO3 (POC) 27.8 (H) 22 - 26 MMOL/L    Base excess (POC) 2.0 mmol/L    sO2 (POC) 82.3 (L) 92 - 97 %    Site RIGHT RADIAL      Device: CPAP      Allens test (POC) Positive      Specimen type (POC) ARTERIAL      Critical value read back [facility]    METABOLIC PANEL, COMPREHENSIVE    Collection Time: 05/25/21 11:52 AM   Result Value Ref Range    Sodium 137 136 - 145 mmol/L    Potassium 3.4 (L) 3.5 - 5.1 mmol/L    Chloride 102 97 - 108 mmol/L    CO2 27 21 - 32 mmol/L    Anion gap 8 5 - 15 mmol/L    Glucose 123 (H) 65 - 100 mg/dL    BUN 10 6 - 20 MG/DL    Creatinine 2.27 (H) 0.55 - 1.02 MG/DL    BUN/Creatinine ratio 4 (L) 12 - 20      GFR est AA 26 (L) >60 ml/min/1.73m2    GFR est non-AA 21 (L) >60 ml/min/1.73m2    Calcium 8.7 8.5 - 10.1 MG/DL    Bilirubin, total 0.6 0.2 - 1.0 MG/DL    ALT (SGPT) 7 (L) 12 - 78 U/L    AST (SGOT) 19 15 - 37 U/L    Alk. phosphatase 56 45 - 117 U/L    Protein, total 7.8 6.4 - 8.2 g/dL    Albumin 3.3 (L) 3.5 - 5.0 g/dL    Globulin 4.5 (H) 2.0 - 4.0 g/dL    A-G Ratio 0.7 (L) 1.1 - 2.2     LACTIC ACID    Collection Time: 05/25/21 11:52 AM   Result Value Ref Range    Lactic acid 1.1 0.4 - 2.0 MMOL/L   MAGNESIUM    Collection Time: 05/25/21 11:52 AM   Result Value Ref Range    Magnesium 2.2 1.6 - 2.4 mg/dL   GLUCOSE, POC    Collection Time: 05/25/21 12:01 PM   Result Value Ref Range    Glucose (POC) 124 (H) 65 - 117 mg/dL    Performed by Diana Spaulding  PCT    NT-PRO BNP    Collection Time: 05/25/21  1:43 PM   Result Value Ref Range    NT pro-BNP 21,901 (H) <125 PG/ML   POC EG7    Collection Time: 05/25/21  2:15 PM   Result Value Ref Range    Calcium, ionized (POC) 1.16 1.12 - 1.32 mmol/L    pH (POC) 7.41 7.35 - 7.45      pCO2 (POC) 38.3 35.0 - 45.0 MMHG    pO2 (POC) 74 (L) 80 - 100 MMHG    HCO3 (POC) 24.5 22 - 26 MMOL/L    Base excess (POC) 0.0 mmol/L    sO2 (POC) 95.0 92 - 97 %    Site RIGHT RADIAL      Device: CPAP      Allens test (POC) Positive      Specimen type (POC) ARTERIAL         Imaging:  CT Results (most recent):  Results from Hospital Encounter encounter on 05/11/21    CT HEAD WO CONT    Narrative  EXAM:  CT HEAD WO CONT    INDICATION: Acute confusion. COMPARISON: CT 5/11/2021    TECHNIQUE: Axial noncontrast head CT from foramen magnum to vertex. Coronal and  sagittal reformatted images were obtained. CT dose reduction was achieved  through use of a standardized protocol tailored for this examination and  automatic exposure control for dose modulation. Adaptive statistical iterative  reconstruction (ASIR) was utilized. FINDINGS:  There is diffuse age-related parenchymal volume loss.  The ventricles  and sulci are age-appropriate without hydrocephalus. There is no mass effect or  midline shift. There is no intracranial hemorrhage or extra-axial fluid  collection. A probable subcentimeter right frontal meningioma is again noted. 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 There is no new abnormal parenchymal attenuation. The basal cisterns are patent. The osseous structures are intact. The visualized paranasal sinuses and mastoid  air cells are clear. Impression  No acute intracranial abnormality. Assessment:     Hospital Problems  Date Reviewed: 4/25/2021        Codes Class Noted POA    Severe sepsis with acute organ dysfunction [facility]) ICD-10-CM: A41.9, R65.20  ICD-9-CM: 038.9, 995.92 Acute 5/11/2021 Yes        * (Principal) Acute encephalopathy ICD-10-CM: G93.40  ICD-9-CM: 348.30  5/11/2021 Unknown              Plan:   82-year-old female with right-hand dominance and a history of end-stage systolic cardiomyopathy, post-LVAD placement with worsening tremors. She  experienced a similar occurrence in April and was diagnosed with multifocal metabolic moyoclonus, which I suspect is also the case with her tremors at the moment. Upper extremities were examined, with asterixis reported at the hands and multifocal myoclonus affecting the biceps and deltoid muscles frequently. Recommend that she be treated for her underlying metabolic problems. There is a chance she has dementia, which may intensify with  metabolic issues or acute infection.     -EEG,pending    -will follow tomorrow   I    Signed By: Pam Casper NP     May 25, 2021

## 2021-05-25 NOTE — CONSULTS
Neuro update:    Prelim EEG read shows discharges. No status. Starting briviact now.      [facility],   Neurologist  Diplomate, American Board of Psychiatry and Neurology  Board Certified, Adult Neurology and Brain Injury Medicine

## 2021-05-25 NOTE — PROGRESS NOTES
Transitions of Care Plan:  RUR: 41% - high  Clinical Plan: transfer into CVICU  Consults: Multiple Disciplines  Baseline:   Disposition: pending medical progress    Events of today noted - RRT and transfer into CVICU for higher level of care. Patient became hypoxic with AMS during HD session. Patient has been accepted to [address] [place] [address] with initial start date of Monday for 1230PM chair time. This has been put on hold given today's setback. CM will continue to follow.     García Jerome, MPH  Care Manager [facility]  Available via J[employer]pt[employer] or

## 2021-05-25 NOTE — PROGRESS NOTES
Rapid Response Documentation    Name: Mark Clarke  YOB: 1950  MRN: [id number]  Admission Date: 5/11/2021    Date of service: 5:31 AM    ____________________________________________________________________________    Rapid response called approximately 0330 after patient woke up more confused than normal.      Per daughter at bedside and patient's primary nurse, patient is confused at baseline but normally interactive and able to recognize her daughter. Now, she is just repeating the same words and does not appear to be focusing on anyone in particular. Per my exam, she does follow commands. There are no focal neurological deficits. Patient's daughter reports increased confusion just prior to dialysis has been an issue in the past.  She also notes that patient has had confusion issues after taking hydralazine (which she last received at 6 PM last night.). She is due for hemodialysis today. Last evening,  patient was reported to be lethargic and oriented only to herself. ABG done at that time was not concerning. At approximately midnight, patient began pulling at critical lines and her LVAD and she kept removing her Trilogy. A sitter was ordered. Normally uses CPAP at night, has been on Trilogy at night. Stat ABG was reassuringly negative for hypercarbia. PO2 72 is a significant improvement from patient's past several ABGs. BMP (creatinine is up to 5.11 (slightly above patient's baseline). Ammonia is less than 10. CBC is stable relatively unchanged from previous. A stat CT of the head showed no acute intracranial abnormality. Due to patient's kidney function and her LVAD, imaging with contrast and/or MRI are not possible.     Acute on chronic confusion  · Most likely metabolic, no focal findings to suggest neuro  · Daughter reports history of same presentation in the past just prior to dialysis  · CV staff has asked dialysis company to dialyze patient first today if possible    Event, findings and options discussed with patient's daughter. She is well versed in her mother's condition and provided a significant amount of background information that was helpful. We will continue to monitor closely. Patient Vitals for the past 12 hrs:   Temp Pulse Resp SpO2   05/25/21 0400 97 °F (36.1 °C) (!) 103 24 95 %   05/25/21 0005    94 %   05/24/21 2359 98 °F (36.7 °C) 91 18 96 %   05/24/21 1916 98.3 °F (36.8 °C) 95 18 94 %       Recent Results (from the past 12 hour(s))   GLUCOSE, POC    Collection Time: 05/24/21  9:24 PM   Result Value Ref Range    Glucose (POC) 128 (H) 65 - 117 mg/dL    Performed by SHA GARDNER    GLUCOSE, POC    Collection Time: 05/25/21  3:40 AM   Result Value Ref Range    Glucose (POC) 119 (H) 65 - 117 mg/dL    Performed by [address] [place] [address] + INR    Collection Time: 05/25/21  3:49 AM   Result Value Ref Range    INR 1.5 (H) 0.9 - 1.1      Prothrombin time 15.8 (H) 9.0 - 11.1 sec   LD    Collection Time: 05/25/21  3:49 AM   Result Value Ref Range     81 - 246 U/L   CBC WITH AUTOMATED DIFF    Collection Time: 05/25/21  3:49 AM   Result Value Ref Range    WBC 4.7 3.6 - 11.0 K/uL    RBC 3.12 (L) 3.80 - 5.20 M/uL    HGB 8.6 (L) 11.5 - 16.0 g/dL    HCT 28.7 (L) 35.0 - 47.0 %    MCV 92.0 80.0 - 99.0 FL    MCH 27.6 26.0 - 34.0 PG    MCHC 30.0 30.0 - 36.5 g/dL    RDW 19.9 (H) 11.5 - 14.5 %    PLATELET 647 (L) 657 - 400 K/uL    MPV 8.9 8.9 - 12.9 FL    NRBC 0.0 0  WBC    ABSOLUTE NRBC 0.00 0.00 - 0.01 K/uL    NEUTROPHILS 79 (H) 32 - 75 %    LYMPHOCYTES 11 (L) 12 - 49 %    MONOCYTES 9 5 - 13 %    EOSINOPHILS 0 0 - 7 %    BASOPHILS 1 0 - 1 %    IMMATURE GRANULOCYTES 0 0.0 - 0.5 %    ABS. NEUTROPHILS 3.8 1.8 - 8.0 K/UL    ABS. LYMPHOCYTES 0.5 (L) 0.8 - 3.5 K/UL    ABS. MONOCYTES 0.4 0.0 - 1.0 K/UL    ABS. EOSINOPHILS 0.0 0.0 - 0.4 K/UL    ABS. BASOPHILS 0.0 0.0 - 0.1 K/UL    ABS. IMM.  GRANS. 0.0 0.00 - 0.04 K/UL    DF SMEAR SCANNED      RBC COMMENTS ANISOCYTOSIS  1+        RBC COMMENTS TEARDROP CELLS  PRESENT        RBC COMMENTS OVALOCYTES  1+        RBC COMMENTS MICROCYTOSIS  1+       METABOLIC PANEL, COMPREHENSIVE    Collection Time: 05/25/21  3:49 AM   Result Value Ref Range    Sodium 137 136 - 145 mmol/L    Potassium 3.3 (L) 3.5 - 5.1 mmol/L    Chloride 100 97 - 108 mmol/L    CO2 25 21 - 32 mmol/L    Anion gap 12 5 - 15 mmol/L    Glucose 121 (H) 65 - 100 mg/dL    BUN 27 (H) 6 - 20 MG/DL    Creatinine 5.11 (H) 0.55 - 1.02 MG/DL    BUN/Creatinine ratio 5 (L) 12 - 20      GFR est AA 10 (L) >60 ml/min/1.73m2    GFR est non-AA 8 (L) >60 ml/min/1.73m2    Calcium 9.2 8.5 - 10.1 MG/DL    Bilirubin, total 0.6 0.2 - 1.0 MG/DL    ALT (SGPT) 8 (L) 12 - 78 U/L    AST (SGOT) 16 15 - 37 U/L    Alk.  phosphatase 54 45 - 117 U/L    Protein, total 7.5 6.4 - 8.2 g/dL    Albumin 3.2 (L) 3.5 - 5.0 g/dL    Globulin 4.3 (H) 2.0 - 4.0 g/dL    A-G Ratio 0.7 (L) 1.1 - 2.2     MAGNESIUM    Collection Time: 05/25/21  3:49 AM   Result Value Ref Range    Magnesium 2.4 1.6 - 2.4 mg/dL   PHOSPHORUS    Collection Time: 05/25/21  3:49 AM   Result Value Ref Range    Phosphorus 4.8 (H) 2.6 - 4.7 MG/DL   POC EG7    Collection Time: 05/25/21  3:56 AM   Result Value Ref Range    Calcium, ionized (POC) 1.14 1.12 - 1.32 mmol/L    FIO2 (POC) 21 %    pH (POC) 7.39 7.35 - 7.45      pCO2 (POC) 40.2 35.0 - 45.0 MMHG    pO2 (POC) 72 (L) 80 - 100 MMHG    HCO3 (POC) 24.3 22 - 26 MMOL/L    Base deficit (POC) 0.7 mmol/L    sO2 (POC) 94.1 92 - 97 %    Site LEFT RADIAL      Device: CPAP      Mode Pressure Support      PEEP/CPAP (POC) 10 cmH2O    Allens test (POC) Positive      Specimen type (POC) ARTERIAL     AMMONIA    Collection Time: 05/25/21  4:29 AM   Result Value Ref Range    Ammonia <10 <32 UMOL/L       ____________________________________________________________________________    Jason Dye, SUNDEEP, RN, NP-C  [phone] or via [employer]

## 2021-05-25 NOTE — PROGRESS NOTES
Physical Therapy    Reviewed chart and noted pt is currently on dialysis. Will defer at this time and continue to follow. Followed up with pt. Noted Rapid response during dialysis. Pt not appropriate at this time will defer for the day.

## 2021-05-25 NOTE — DIALYSIS
[facility] Team [facility]  [phone]    Vitals   Pre   Post   Assessment   Pre   Post     Temp  Temp: 98.1 °F (36.7 °C) (05/25/21 0908)  98.3 Axillary LOC  AOX0, overnight confusion  persist Continued lethargy  (postictal state)  1215: pt arousing, opening eyes, intentional movement. HR   Pulse (Heart Rate): 90 (05/25/21 0908) 101 Lungs   Trilogy  Clear/diminished  Trilogy w/1L Oxygen added post rapid response   B/P   BP:  () (05/24/21 0956) Map 80   Via doppler  R lower arm Cardiac   Vpaced  +murmur  LVAD  Vpaced  +murmur  LVAD   Resp   Resp Rate: 18 (05/25/21 0908)   Remains on Trilogy 22 Skin   Cool/dry  Warm/dry   Pain level  Pain Intensity 1: 0 (05/25/21 0814) 0Flacc Edema  Trace edema in left arm (baseline per daughter at bedside) No other edema noted   No edema noted   Orders:    Duration:   Start:    0908 End:    1138 Total:   2.5hr   Dialyzer:   Dialyzer/Set Up Inspection: Squire Pacer (05/25/21 0908)   K Bath:   Dialysate K (mEq/L): 3.5 (05/25/21 0908)   Ca Bath:   Dialysate CA (mEq/L): 2.5 (05/25/21 0908)   Na/Bicarb:   Dialysate NA (mEq/L): 140 (05/25/21 0908)   Target Fluid Removal:   Goal/Amount of Fluid to Remove (mL): 1000 mL (05/25/21 0908)   Access     Type & Location:   R tunneled CVC CDI dated 05/24/21. Each catheter limb disinfected per p&p, caps removed, hubs disinfected per p&p.  +aspiration/flush X2. Post tx: All possible blood returned. Each dialysis catheter limb disinfected per p&p, blood returned per p&p, each dialysis hub disinfected per p&p, post dialysis catheter dwell instilled per order, and caps applied. Line labeled.        Labs     Obtained/Reviewed   Critical Results Called   Date when labs were drawn-  Hgb-    HGB   Date Value Ref Range Status   05/25/2021 8.6 (L) 11.5 - 16.0 g/dL Final     K-    Potassium   Date Value Ref Range Status   05/25/2021 3.3 (L) 3.5 - 5.1 mmol/L Final     Ca-   Calcium   Date Value Ref Range Status   05/25/2021 9.2 8.5 - 10.1 MG/DL Final     Bun-   BUN   Date Value Ref Range Status   05/25/2021 27 (H) 6 - 20 MG/DL Final     Creat-   Creatinine   Date Value Ref Range Status   05/25/2021 5.11 (H) 0.55 - 1.02 MG/DL Final        Medications/ Blood Products Given     Name   Dose   Route and Time     Heparin 1:1000 1900units  1.9ml Blue port post 1142   Heparin 1:1000 1900units 1.9ml Red port post 1142        Blood Volume Processed (BVP):    53.2 Net Fluid   Removed:  832Net   Comments   Time Out Done: 0830  Primary Nurse Rpt Pre: Juvenal Scott, RN. Primary Nurse Ty Schwartz RN  Pt Education:Education provided to daughter at bedside r/to todays tx to include pre-tx conversation with Dr. Fifi Carcamo, adjustments made to goal from 2kg to 0-1kg as tolerated  Care Plan: Continue in hospital hd as per md order. Sitter will remains at bedside due to impulsiveness. Tx Summary: Patient/family introduction. Daughter and primary at bedside. Report received. HD start time: 0908 0915: Dr. Fifi Carcamo at bedside   1000: Pt Map trending upwards to 105, primary RN notified. Primary RN treated with Hydralazine as per parameter. 1030: Map improved at 80, primary RN provided with updated. Pt becoming more clear. Pt making eye contact, waved at MD at bedside. 1040: ph7.2 Conductivity 14.0  1050: Pt continues to have episodes of involuntary mild jerking which was reported by primary and daughter prior to tx  (hx of intermit jerking believed to be metabolic as per daughter)  [phone]: pt making eye contact, answering simple questions. MAP remains within desired parameters. Daughter remains at bedside. 1138: Pt suddenly presented with R upwards gaze with associated seizure-like jerking. Pupils fixed, non-responsive. Rapid response initiated by \"sitter\" while I immediately stopped tx returning all possible blood. 1139: Team arrived at bedside, trilogy removed with immediate drop in saturation levels from 96% to 70%.  ABGs drawn by respiratory, labs drawn/sent by staff, Albumin given via triple lumen by primary RN (Dr. Coco Moran ordered) Trilogy reapplied with 1L Oxygen with improved saturation at 96%. 1145: Pt stabilized, Dr. Violet Ventura notified. He does not feel this is dialysis related. 1215: Intensive care MD at bedside to evaluate plan of care. Admiting Diagnosis: Acute Encephalopathy  Pt's previous clinic- tentatively [facility]  Consent signed - Informed Consent Verified: Yes (05/25/21 0908)  [facility] Consent - Verified/on chart   Hepatitis Status- Hep B ag Negative/Hep B Ab Susceptible 05/19/21  Machine #- Machine Number: [id number] (05/25/21 1276)  Telemetry status- Bedside telemetry in progress  Pre-dialysis wt. - Pre-Dialysis Weight: 108.5 kg (239 lb 3.2 oz) (05/22/21 1245)

## 2021-05-25 NOTE — PROGRESS NOTES
Pharmacist Note  Warfarin Dosing  Consult provided for this 78 y. o.female to manage warfarin for LVAD    INR Goal: 1.8-2.5 (2/2 GIB)  Home regimen/ tablet size: 4mg every other day alternating with 3 mg QOD    Drugs that may increase INR: None  Drugs that may decrease INR: None  Other current anticoagulants/ drugs that may increase bleeding risk: none  Risk factors: Age > 65, dialysis  Daily INR ordered: YES    Recent Labs     05/25/21  0349 05/24/21  0017 05/23/21  0332   HGB 8.6* 8.7* 8.9*   INR 1.5* 1.3* 1.3*     Date               INR                  Dose  5/11  3.3  hold   5/12                1.9                    2 mg      5/13                 1.4                   4 mg     5/14                 1.4                   6 mg      5/15  1.9  4 mg  5/16  3.1  HOLD  5/17                 4.1                  HOLD     5/18                 4.2                  HOLD   5/19                 3.9                  HOLD   5/20  3.2  HOLD  5/21  2.9  HOLD  5/22  1.5  2.5 mg              5/23  1.3  4 mg  5/24  1.3   4 mg  5/25  1.5   4 mg                                                                  Assessment/ Plan: Will order warfarin 4 mg x 1 today     Pharmacy will continue to monitor daily and adjust therapy as indicated. Please contact the pharmacist at  for outpatient recommendations if needed.

## 2021-05-25 NOTE — PROGRESS NOTES
EEG Tech approached pt but could not access pt for exam. Spoke with Nurse who informed tech they are preparing pt for transport to a new unit. EEG tech will reapproach when pt arrives at new unit.

## 2021-05-26 NOTE — PROGRESS NOTES
Pharmacy Renal Dose protocl  Indication:  chronic drive line infection  Homed dose cephalexin 250mg po bid-change to IV cefazolin  Current regimen:  500 mg IV q12h    Recent Labs     21  0255 21  1152 21  0349 21  0017   WBC 2.8*  --  4.7 4.6   CREA 3.48* 2.27* 5.11* 4.15*   BUN 16 10 27* 22*     Est CrCl: iHD  Temp (24hrs), Av.2 °F (36.8 °C), Min:97.2 °F (36.2 °C), Max:99.3 °F (37.4 °C)    Plan: Change to 1gm IV q24h as per dosing policy for patient on hemodialysis.

## 2021-05-26 NOTE — PROGRESS NOTES
ICU Progress Note (Brief Update)    I spoke with Thurmon Angelucci [facility] & Daughter) at the bedside. We spoke at length about her mother's illness. Decision made to make Mrs. Teresa Chong a DNR/DNR. Decision to consult Hospice to discuss options for her and the patient.     [address], DO   Staff Intensivist/Anesthesiologist  Critical Care Medicine

## 2021-05-26 NOTE — PROGRESS NOTES
[facility]    ICU TEAM Progress Note    Name: Tello Brock   : 1950   MRN: [id number]   Date: 2021           ICU Assessment     DNR/DNI per POA/Daughter Crow Bowden    Exploring options for Hospice (inpatient/outpatient/home)    1. Septic Shock (drive-line? Urine? Chest?)  2. Acute Metabolic Encephalopathy  a. Seizures?  b. Infection?  c. Hypoactive Delirium?  d. Hypoxia?  e. Multi-factoral?  3. Acute on Chronic Hypoxic Respiratory Failure  4. Atelectasis  5. Acute Kidney Injury/CKD  a. IHD per Nephrology (TTS; Marlene/Karen)  6. Morbid Obesity  7. S/p LVAD (4/15/17 at [facility])  8. Hx of Recurrent VT  9. Mild Pulm HTN  10. 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 C. albicans (in urine)  13. A fib  14. Chronic Anemia  15. ATTR Amyloidosis             ICU Comprehensive Plan of Care:     1. Neurological System  Avoid Benzos/Narcotics  Lights on during day  Low dose Precedex  Neurology consulted per HM team  EEG Pending  CT head Neg  Spontaneous Awakening Trial: N/A  Sedation: None  Analgesia: Acetaminophen    2. Cardiovascular System  LVAD  CTS/AHF Following  MAP Goal of: > 65 mmHg  None - For above SBP/MAP goals  Transfusion Trigger (Hgb): <7 g/dL  Keep K > 4; Mg > 2     3. Respiratory System  Utilize triology as able  Spontaneous Breathing Trial: N/A  Pulmonary toilet: Duo-Nebs   SpO2 Goal: > 90%  DVT Prophylaxis: SCD's or Sequential Compression Device     4. Renal/GI/Endocrine System  Appreciate Nephrology  IHD  Ulcer Prophylaxis: Pepcid (famotidine)   Bowel Regimen: None needed at this time  Feeding:  Pending   Blood Sugar Goal 120-180 - Glycemic Control: Insulin    5. Infectious Disease  Appreciate ID Team  Indwelling Catheter:  Tubes: None  Lines: Central Line and Antonio  Drains: None  D - De-escalation of Antibiotics:   Ancef    6.  PT/OT: PT consulted and on board, OT consulted and on board and Speech therapy consulted and on board 7. Goals of Care Discussion with family Yes     8. Plan of Care/Code Status: Full Code    9. Discussed Care Plan with Bedside RN    10. Documentation of Current Medications    Subjective:   Progress Note: 5/26/2021      Reason for ICU Admission: Acute Metabolic Encephalopathy & Respiratory Faliure     HPI:  Asked to see patient for possible ICU admission. Long, chronic history (see assessment above), prior LVAD, now dialysis dependent, worsening mental status, on IHD, multiple infections this hospitalizations as well as failure to thrive. RRT on 5/25 & 5/26, due to lethargy that is waxing & waning. ABG (with hypoxia), CT head (WNL) and dialysis completed -- patient still waxing & waning. Upon my assessment, she MIJARES's, arousable, one word answers to daughter at the bedside and appears to be protecting her airway.      Overnight Events:   NA      Active Problem List:     Problem List  Date Reviewed: 4/25/2021        Codes Class    Severe sepsis with acute organ dysfunction ([facility] ICD-10-CM: A41.9, R65.20  ICD-9-CM: 038.9, 995.92 Acute        * (Principal) Acute encephalopathy ICD-10-CM: G93.40  ICD-9-CM: 348.30         Dyspnea ICD-10-CM: R06.00  ICD-9-CM: 786.09         HAIRSTON (dyspnea on exertion) ICD-10-CM: R06.00  ICD-9-CM: 786.09         Incontinence in female ICD-10-CM: R32  ICD-9-CM: 625.6         LVAD (left ventricular assist device) present [facility]) ICD-10-CM: Z95.811  ICD-9-CM: V43.21         Hospital discharge follow-up ICD-10-CM: Z09  ICD-9-CM: V67.59         ICD (implantable cardioverter-defibrillator) battery depletion ICD-10-CM: Z45.02  ICD-9-CM: V53.32         Coagulopathy ([facility] ICD-10-CM: D68.9  ICD-9-CM: 286.9         Open wound of left lower leg ICD-10-CM: D42.537V  ICD-9-CM: 891.0         Anemia ICD-10-CM: D64.9  ICD-9-CM: 285.9         ROCIO (acute kidney injury) ([facility] ICD-10-CM: N17.9  ICD-9-CM: 584.9         NSVT (nonsustained ventricular tachycardia) ([facility] ICD-10-CM: I47.2  ICD-9-CM: 427.1 NICM (nonischemic cardiomyopathy) ([facility]) ICD-10-CM: I42.8  ICD-9-CM: 425.4         Coronary artery disease involving native coronary artery of native heart without angina pectoris ICD-10-CM: I25.10  ICD-9-CM: 414.01         JED on CPAP ICD-10-CM: G47.33, Z99.89  ICD-9-CM: 327.23, V46.8         Chronic venous insufficiency ICD-10-CM: I87.2  ICD-9-CM: 459.81         Ulcer of right foot, limited to breakdown of skin ([facility] ICD-10-CM: J12.022  ICD-9-CM: 707.15         Acute on chronic systolic CHF (congestive heart failure) ([facility]) ICD-10-CM: I50.23  ICD-9-CM: 428.23, 428.0         Obesity, morbid ([facility]) ICD-10-CM: E66.01  ICD-9-CM: 278.01               Past Medical History:      has a past medical history of Asthma, Cancer ([facility] Cancer ([facility] Congestive heart failure, unspecified, CRI (chronic renal insufficiency), Depression, Diabetes ([facility] Hypertension, and Severe sepsis with acute organ dysfunction ([facility] (5/11/2021). Past Surgical History:      has a past surgical history that includes hx mastectomy; hx hysterectomy; hx hernia repair; ir insert non tunl cvc over 5 yrs (4/26/2021); and ir insert tunl cvc w/o port over 5 yr (5/5/2021). Home Medications:     Prior to Admission medications    Medication Sig Start Date End Date Taking? Authorizing Provider   warfarin (COUMADIN) 3 mg tablet Take 3 mg by mouth every other day. 3 mg every other day alternating with 4 mg every other day   Yes Provider, Historical   warfarin (COUMADIN) 4 mg tablet Take 4 mg by mouth every other day. 3 mg every other day alternating with 4 mg every other day   Yes Provider, Historical   bumetanide (BUMEX) 2 mg tablet Take 1 Tab by mouth three (3) times daily for 30 days. 5/6/21 6/5/21  Wally Ribeiro MD   isosorbide mononitrate ER (IMDUR) 30 mg tablet Take 1 Tab by mouth daily for 30 days. 5/7/21 6/6/21  Brooks Smith MD   therapeutic multivitamin [facility]) tablet Take 1 Tab by mouth daily for 30 days.  5/7/21 6/6/21 Adiel Azar MD   cephALEXin (KEFLEX) 250 mg capsule Take 1 Cap by mouth two (2) times a day for 30 days. 5/6/21 6/5/21  Misha Ribeiro MD   FeroSul 325 mg (65 mg iron) tablet TAKE 1 TABLET BY MOUTH EVERY MORNING WITH BREAKFAST 4/6/21   Pravin Cifuentes MD   ranolazine ER (RANEXA) 500 mg SR tablet TAKE 2 TABLETS BY MOUTH TWICE DAILY 3/16/21   Pravin Cifuentes MD   hydrALAZINE (APRESOLINE) 25 mg tablet Take 4 Tabs by mouth three (3) times daily. 2/25/21   Elvi Limon, TEA   insulin detemir (LEVEMIR U-100 INSULIN SC) 20 Units by SubCUTAneous route two (2) times a day. Provider, Historical   PARoxetine (PAXIL) 20 mg tablet Take 20 mg by mouth daily. Provider, Historical   albuterol (PROVENTIL HFA, VENTOLIN HFA, PROAIR HFA) 90 mcg/actuation inhaler Take 2 Puffs by inhalation every four (4) hours as needed for Wheezing. 12/27/20   Pravin Cifuentes MD   tafamidis (Vyndamax) 61 mg cap Take 61 mg by mouth daily. 12/9/20   Dotty Post, NP   magnesium oxide (MAG-OX) 400 mg tablet Take 1 Tab by mouth daily. 12/8/20   Dotty Post, NP   budesonide-formoteroL (Symbicort) 160-4.5 mcg/actuation HFAA Take 2 Puffs by inhalation two (2) times daily as needed. Provider, Historical       Allergies/Social/Family History: Allergies   Allergen Reactions    Atarax [Hydroxyzine Hcl] Drowsiness    Benzodiazepines Other (comments)     Respiratory issues      Social History     Tobacco Use    Smoking status: Never Smoker    Smokeless tobacco: Never Used   Substance Use Topics    Alcohol use: Not Currently      No family history on file. Review of Systems:     A comprehensive review of systems was negative except for that written in the HPI.     Objective:   Vital Signs:  Visit Vitals  BP (!) 81/69   Pulse 70   Temp 97.4 °F (36.3 °C)   Resp 17   Ht 5' 7\" (1.702 m)   Wt 102.9 kg (226 lb 14.4 oz)   SpO2 100%   BMI 35.54 kg/m²    O2 Flow Rate (L/min): 1 l/min O2 Device: BIPAP Temp (24hrs), Av.2 °F (36.8 °C), Min:97.2 °F (36.2 °C), Max:99.3 °F (37.4 °C)    CVP (mmHg): 10 mmHg (21 0700)      Intake/Output:     Intake/Output Summary (Last 24 hours) at 2021 1155  Last data filed at 2021 0900  Gross per 24 hour   Intake 412.27 ml   Output 0 ml   Net 412.27 ml       Physical Exam:    General appearance: mild distress, appears older than stated age, moderately obese, mildly sedated  Eyes: negative  Nose: Nares normal. Septum midline. Mucosa normal. No drainage or sinus tenderness. Lungs: rhonchi R apex, L apex  Heart: paced   Abdomen: soft, non-tender. Bowel sounds normal. No masses,  no organomegaly, mild distention  Extremities: venous stasis dermatitis noted  Pulses: 2+ and symmetric  Skin: anterior shins with ecchymotic lesions of ventral surfaces bilaterally  Neurologic: MIJARES's, intermittent command following    LABS AND  DATA: Personally reviewed  Recent Labs     21  034   WBC 2.8* 4.7   HGB 8.3* 8.6*   HCT 28.1* 28.7*   * 130*     Recent Labs     21  1152 21  0349    137 137   K 3.9 3.4* 3.3*    102 100   CO2 27 27 25   BUN 16 10 27*   CREA 3.48* 2.27* 5.11*   * 123* 121*   CA 9.4 8.7 9.2   MG 2.5* 2.2 2.4   PHOS 3.3  --  4.8*     Recent Labs     21  0255 21  1152   AP 50 56   TP 7.1 7.8   ALB 3.1* 3.3*   GLOB 4.0 4.5*     Recent Labs     21  0349   INR 1.9* 1.5*   PTP 18.9* 15.8*      Recent Labs     21  1415 21  1144 21  0356   PHI 7.41 7.37 7.39   PCO2I 38.3 47.7* 40.2   PO2I 74* 49* 72*   FIO2I  --   --  21     No results for input(s): CPK, CKMB, TROIQ, BNPP in the last 72 hours.     Hemodynamics:   PAP:   CO:     Wedge:   CI:     CVP:  CVP (mmHg): 10 mmHg (21 0700) SVR:       PVR:       Ventilator Settings:  Mode Rate Tidal Volume Pressure FiO2 PEEP   Spontaneous   450 ml  5 cm H2O 21 % 5 cm H20     Peak airway pressure: 25 cm H2O Minute ventilation: 10.1 l/min        MEDS: Reviewed    Chest X-Ray:  CXR Results  (Last 48 hours)               05/25/21 1425  XR CHEST PORT Final result    Impression:  1. LVAD, catheters and pacing wires are all similar to prior exam.   2. No definite congestion. No confluent airspace disease. Narrative:  INDICATION:  Respiratory failure. Altered neurologic status. Follow-up. COMPARISON:  Chest x-ray 5/11/21   FINDINGS:    A portable AP upright radiograph of the chest was obtained at 1412 hours. Right-sided pacemaker type device and lead wires again demonstrated and   unchanged. The right-sided tunneled dialysis catheter is also unchanged. Left-sided triple lumen central venous catheter is also unchanged and adequately   positioned. The LVAD device is seen at the bottom of the film and appears unchanged in   position. Cardiomegaly is stable. Minimal if any pulmonary vascular congestion. No confluent airspace disease or consolidation. No evidence of pleural fluid or pneumothorax. .                Multidisciplinary Rounds Completed: Yes    ABCDEF Bundle/Checklist Completed:  Yes    SPECIAL EQUIPMENT  LVAD    DISPOSITION  Stay in ICU    CRITICAL CARE CONSULTANT NOTE  I had a face to face encounter with the patient, reviewed and interpreted patient data including clinical events, labs, images, vital signs, I/O's, and examined patient. I have discussed the case and the plan and management of the patient's care with the consulting services, the bedside nurses and the respiratory therapist.      NOTE OF PERSONAL INVOLVEMENT IN CARE   This patient has a high probability of imminent, clinically significant deterioration, which requires the highest level of preparedness to intervene urgently.  I participated in the decision-making and personally managed or directed the management of the following life and organ supporting interventions that required my frequent assessment to treat or prevent imminent deterioration. I personally spent 90 minutes of critical care time. This is time spent at this critically ill patient's bedside actively involved in patient care as well as the coordination of care. This does not include any procedural time which has been billed separately.     Corrinne Bread, DO, MS  Staff Intensivist/Anesthesiologist  [facility]  5/26/2021

## 2021-05-26 NOTE — PROGRESS NOTES
Move towards hospice noted--seems appropriate, but will follow peripherally until final decision is made.

## 2021-05-26 NOTE — PROGRESS NOTES
Transitions of Care Plan:  RUR:  39%   Clinical Plan: Hospice  Consults: Hospice  Baseline:  Disposition: Hospice ([facility] vs home vs hospice house)    CM updated by intensivist and consult received for hospice. Spoke with patient's daughter - provided referral sent to [address] [place] [address] for consult on possible hospice and options of inpatient or home hospice. 1450 - Received update from [facility]. Plans to admit into [facility] hospice tomorrow.     Manuel Love, MPH  Care Manager [facility]  Available via [address] [place] [address] or

## 2021-05-26 NOTE — PROGRESS NOTES
Pharmacist Note  Warfarin Dosing  Consult provided for this 78 y. o.female to manage warfarin for LVAD    INR Goal: 1.8-2.5 (2/2 GIB)  Home regimen/ tablet size: 4mg every other day alternating with 3 mg QOD    Drugs that may increase INR: None  Drugs that may decrease INR: None  Other current anticoagulants/ drugs that may increase bleeding risk: none  Risk factors: Age > 65, dialysis  Daily INR ordered: YES    Recent Labs     05/26/21  0255 05/25/21  0349 05/24/21  0017   HGB 8.3* 8.6* 8.7*   INR 1.9* 1.5* 1.3*     Date               INR                  Dose  5/11  3.3  hold   5/12                1.9                    2 mg      5/13                 1.4                   4 mg     5/14                 1.4                   6 mg      5/15  1.9  4 mg  5/16  3.1  HOLD  5/17                 4.1                  HOLD     5/18                 4.2                  HOLD   5/19                 3.9                  HOLD   5/20  3.2  HOLD  5/21  2.9  HOLD  5/22  1.5  2.5 mg              5/23  1.3  4 mg  5/24  1.3   4 mg  5/25  1.5   4 mg  5/26  1.9  2.5 mg                                                                   Assessment/ Plan: Will order warfarin 2.5 mg x 1 today     Pharmacy will continue to monitor daily and adjust therapy as indicated. Please contact the pharmacist at  for outpatient recommendations if needed.

## 2021-05-26 NOTE — PROGRESS NOTES
[facility] VAD/Heart Failure Progress Note    Admit Date: 2021  POD:  * No surgery found *    Procedure:          Subjective:   Dyspnea, fatigue, and weakness; admitted to ICU; palliative care consult; good flows      Objective:   Vitals:  Blood pressure (!) 81/69, pulse 70, temperature 97.4 °F (36.3 °C), resp. rate 17, height 5' 7\" (1.702 m), weight 226 lb 14.4 oz (102.9 kg), SpO2 100 %.   Temp (24hrs), Av.2 °F (36.8 °C), Min:97.2 °F (36.2 °C), Max:99.3 °F (37.4 °C)    Hemodynamics:   CO:     CI:     CVP: CVP (mmHg): 10 mmHg (21 0700)   SVR:     PAP Systolic:     PAP Diastolic:     PVR:     WP74:     SCV02:      VAD Interrogation: LVAD (Heartmate)  Pump Speed (RPM): 9400  Pump Flow (LPM): 4.1  Chatter in Lines: No  PI (Pulsitility Index): 4.3  Power: 5.2  MAP: 80   Test: Yes  Back Up  at Bedside & Labeled: Yes  Power Module Test: No  Driveline Site Care: No  Driveline Dressing: Intact    EKG/Rhythm:      Extubation Date / Time:     CT Output:     Ventilator:  Ventilator Volumes  Vt Set (ml): 450 ml (21 0750)  Vt Exhaled (Machine Breath) (ml): 473 ml (21 0750)  Vt Spont (ml): 520 ml (21 0235)  Ve Observed (l/min): 10.1 l/min (21 0235)    Oxygen Therapy:  Oxygen Therapy  O2 Sat (%): 100 % (21 1100)  Pulse via Oximetry: 98 beats per minute (21 0713)  O2 Device: BIPAP (21 0800)  Skin Assessment: Clean, dry, & intact (21)  Skin Protection for O2 Device: No (21)  O2 Flow Rate (L/min): 1 l/min (21 0800)  O2 Temperature:  (HME) (21 0328)  FIO2 (%): 21 % (212)  ETCO2 (mmHg): 36 mmHg (21 0200)    CXR:    Admission Weight: Last Weight   Weight: 269 lb 10 oz (122.3 kg) Weight: 226 lb 14.4 oz (102.9 kg)     Intake / Output / Drain:  Current Shift:  0701 - 1900  In: 67.1 [I.V.:67.1]  Out: -   Last 24 hrs.:     Intake/Output Summary (Last 24 hours) at 2021 [address] filed at 5/26/2021 0900  Gross per 24 hour   Intake 412.27 ml   Output 832 ml   Net -419.73 ml         No results for input(s): CPK, CKMB, TROIQ in the last 72 hours.   Recent Labs     05/26/21  0255 05/25/21  1152 05/25/21  0349 05/24/21  0017    137 137 134*   K 3.9 3.4* 3.3* 3.6   CO2 27 27 25 27   BUN 16 10 27* 22*   CREA 3.48* 2.27* 5.11* 4.15*   * 123* 121* 119*   PHOS 3.3  --  4.8* 3.8   MG 2.5* 2.2 2.4 2.1   WBC 2.8*  --  4.7 4.6   HGB 8.3*  --  8.6* 8.7*   HCT 28.1*  --  28.7* 29.7*   *  --  130* 129*     Recent Labs     05/26/21  0255 05/25/21  0349 05/24/21  0017   INR 1.9* 1.5* 1.3*   PTP 18.9* 15.8* 13.5*     No lab exists for component: PBNP        Current Facility-Administered Medications:     warfarin (COUMADIN) tablet 2.5 mg, 2.5 mg, Oral, ONCE, Migel Lynn DO    famotidine (PF) (PEPCID) 20 mg in 0.9% sodium chloride 10 mL injection, 20 mg, IntraVENous, Q12H, Migel Lynn DO    hydrALAZINE (APRESOLINE) 20 mg/mL injection 20 mg, 20 mg, IntraVENous, Q6H PRN, Migel Lynn DO    dexmedeTOMidine in 0.9 % NaCl (PRECEDEX) 400 mcg/100 mL (4 mcg/mL) infusion soln, 0.1-1.5 mcg/kg/hr, IntraVENous, TITRATE, Migel Lynn DO, Last Rate: 21.2 mL/hr at 05/26/21 0825, 0.8 mcg/kg/hr at 05/26/21 0825    brivaracetam (BRIVIACT) injection 100 mg, 100 mg, IntraVENous, Q12H, [place], Supakunya K, DO, 100 mg at 05/26/21 0515    alteplase (CATHFLO) 2 mg in sterile water (preservative free) 2 mL injection, 2 mg, InterCATHeter, PRN, Chas Markham MD    isosorbide mononitrate ER (IMDUR) tablet 30 mg, 30 mg, Oral, BID, Carlos Waller MD, 30 mg at 05/24/21 1743    L.acidophilus-paracasei-S.thermophil-bifidobacter (RISAQUAD) 8 billion cell capsule, 1 Capsule, Oral, DAILY, AUGUSTUS Ward MD, 1 Capsule at 05/24/21 0955    loperamide (IMODIUM) capsule 2 mg, 2 mg, Oral, Q4H PRN, MAGNO Ward MD    albuterol-ipratropium (DUO-NEB) 2.5 MG-0.5 MG/3 ML, 3 mL, Nebulization, BID RT, Toribio Goodpasture, MD, 3 mL at 05/26/21 0754    simethicone (MYLICON) tablet 80 mg, 80 mg, Oral, QID PRN, SHAHANA Ward MD    epoetin amalia-epbx (RETACRIT) injection 20,000 Units, 20,000 Units, SubCUTAneous, Q TUE, THU & SAT, Dea Win MD, 20,000 Units at 05/25/21 2120    artificial saliva (MOUTH KOTE) 1 Spray, 1 Spray, Oral, PRN, Braxton, Triny B, NP    cephALEXin (KEFLEX) capsule 250 mg, 250 mg, Oral, BID, Braxton, Triny B, NP, 250 mg at 05/24/21 2120    PARoxetine (PAXIL) tablet 20 mg, 20 mg, Oral, DAILY, AlmsalFestus boyd MD, 20 mg at 05/24/21 0956    hydrocortisone (CORTAID) 1 % cream, , Topical, PRN, Festus Ribeiro MD, Given at 05/24/21 1746    glucose chewable tablet 16 g, 4 Tablet, Oral, PRN, Elda Pineda MD    dextrose (D50W) injection syrg 12.5-25 g, 25-50 mL, IntraVENous, PRN, Elda Pineda MD    glucagon [facility]) injection 1 mg, 1 mg, IntraMUSCular, PRN, Elda Pineda MD    insulin lispro (HUMALOG) injection, , SubCUTAneous, AC&HS, Amber Thorpe MD, 2 Units at 05/24/21 0956    benzocaine-zinc cl-benzalkonium cl (ORAJEL) 20-0.1-0.02 % mucosal gel, , Oral, PRN, Toribio Goodpasture, MD    magic mouthwash cpd (without sucralfate), 5 mL, Oral, DAILY PRN, Toribio Goodpasture, MD, 5 mL at 05/19/21 1507    balsam peru-castor oiL (VENELEX) ointment, , Topical, BID, Toribio Goodpasture, MD, Given at 05/25/21 2026    albumin human 25% (BUMINATE) solution 50 g, 50 g, IntraVENous, DIALYSIS PRN, Jaspreet Mendoza MD    heparin (porcine) 1,000 unit/mL injection 1,900 Units, 1,900 Units, InterCATHeter, DIALYSIS PRN, 1,900 Units at 05/25/21 1045 **AND** heparin (porcine) 1,000 unit/mL injection 1,900 Units, 1,900 Units, InterCATHeter, DIALYSIS PRN, Dea Win MD, 1,900 Units at 05/25/21 1044    hydrALAZINE (APRESOLINE) 20 mg/mL injection 20 mg, 20 mg, IntraVENous, Q6H PRN, Triny Limon NP, 20 mg at 05/24/21 0010    hydrALAZINE (APRESOLINE) 20 mg/mL injection 10 mg, 10 mg, IntraVENous, Q6H PRN, Braxton, Triny B, NP, 10 mg at 05/25/21 0955    [Held by provider] hydrALAZINE (APRESOLINE) tablet 100 mg, 100 mg, Oral, TID, Braxton, Triny B, NP, 100 mg at 05/24/21 1748    tafamidis cap 61 mg (Patient Supplied), 61 mg, Oral, DAILY, Clemetine MD Lindsey, 61 mg at 05/24/21 0955    ondansetron (ZOFRAN) injection 4 mg, 4 mg, IntraVENous, Q4H PRN, Valeriy Morrow MD, 4 mg at 05/23/21 1216    zinc oxide-cod liver oil (DESITIN) 40 % paste, , Topical, Q12H, Shivani MCGREGOR MD, Given at 05/25/21 2141    sodium chloride (NS) flush 5-10 mL, 5-10 mL, IntraVENous, PRN, Bob Chavez MD    sodium chloride (NS) flush 5-40 mL, 5-40 mL, IntraVENous, Q8H, Gerald Paniagua MD, 10 mL at 05/26/21 0517    sodium chloride (NS) flush 5-40 mL, 5-40 mL, IntraVENous, PRN, Gerald Paniagua MD    acetaminophen (TYLENOL) tablet 650 mg, 650 mg, Oral, Q6H PRN **OR** acetaminophen (TYLENOL) suppository 650 mg, 650 mg, Rectal, Q6H PRN, Gerald Paniagua MD    Warfarin - Pharmacy to Dose, , Other, Rx Dosing/Monitoring, Eduardo Walter NP    sodium chloride (NS) flush 5-40 mL, 5-40 mL, IntraVENous, Q8H, Braxton, Triny B, NP, 10 mL at 05/26/21 0516    sodium chloride (NS) flush 5-40 mL, 5-40 mL, IntraVENous, PRN, Braxton, Triny B, NP, 10 mL at 05/23/21 1216    nystatin (MYCOSTATIN) 100,000 unit/gram powder, , Topical, PRN, Shivani Cuenca MD    A/P  S/P LVAD - good flows  Renal failure - HD  Urosepsis - Abx's  Need for anticoagulation - coumadin      Risk of morbidity and mortality - high  Medical decision making - high complexity  4.      Signed By: Tejinder Solomon MD

## 2021-05-26 NOTE — PROGRESS NOTES
Neurology Progress Note  Nahid Small NP    Admit Date: 2021   LOS: 14 days      Daily Progress Note: 2021    HPI: Ms. Simón Hogan is a 78year old female here for advanced heart failure S/P LVAD on 4/15 with recurrent VT, septic shock, acute on chronic respiratory failure, and acute metabolic encephalopathy who Neurology was consulted for comment on patient's confusion and tremors. She had previously been seen by Neurology at a prior admission and diagnosed with multifocal metabolic myoclonus and it is suspected this is the same. She was started on briviact and placed on EEG without obvious epileptic discharges on preliminary read. Subjective:     ALEX    Allergies   Allergen Reactions    Atarax [Hydroxyzine Hcl] Drowsiness    Benzodiazepines Other (comments)     Respiratory issues       Review of Systems:  Review of systems not obtained due to patient factors. Objective:   Vital signs  Temp (24hrs), Av.3 °F (36.8 °C), Min:97 °F (36.1 °C), Max:99.3 °F (37.4 °C)   1901 -  0700  In: 65.2 [I.V.:65.2]  Out: 0    0701 - 1900  In: 314.2 [P.O.:250; I.V.:64.2]  Out: 832   Visit Vitals  BP 91/65   Pulse 70   Temp 98.7 °F (37.1 °C)   Resp 14   Ht 5' 7\" (1.702 m)   Wt 106 kg (233 lb 11 oz)   SpO2 97%   BMI 36.60 kg/m²    O2 Flow Rate (L/min): 7 l/min O2 Device: Other (comment) (Trilogy 7L O2)   Vitals:    21 2100 21 2200 21 2201 21 2300   BP: 91/72   91/65   Pulse: 70 70 70 70   Resp: 15 17 15 14   Temp:       TempSrc:       SpO2: 99% 97% 98% 97%   Weight:       Height:            Physical Exam:  GENERAL: On BiPAP/CPAP, dried oral blood  SKIN: BLE dusky, discolored and cool to touch    Neurologic Exam:  Mental Status:  Somnolent, not following commands or opening eyes    Language:    None verbal at this encounter-moaning to questions. Cranial Nerves:   Pupils left 5 mm, Right 4mm, round and reactive to light.      Blinks to threats             Motor:    Not following commands                                      Weakly withdraws to BLE                                      Actively resisting/spont moving L>RUE, some proximal bicep/tricep spasm-also L>R     Reflexes:    Corneal, cough +    Labs:  Lab Results   Component Value Date/Time    WBC 4.7 05/25/2021 03:49 AM    HGB 8.6 (L) 05/25/2021 03:49 AM    HCT 28.7 (L) 05/25/2021 03:49 AM    PLATELET 932 (L) 87/63/6098 03:49 AM    MCV 92.0 05/25/2021 03:49 AM     Lab Results   Component Value Date/Time    Sodium 137 05/25/2021 11:52 AM    Potassium 3.4 (L) 05/25/2021 11:52 AM    Chloride 102 05/25/2021 11:52 AM    CO2 27 05/25/2021 11:52 AM    Anion gap 8 05/25/2021 11:52 AM    Glucose 123 (H) 05/25/2021 11:52 AM    BUN 10 05/25/2021 11:52 AM    Creatinine 2.27 (H) 05/25/2021 11:52 AM    BUN/Creatinine ratio 4 (L) 05/25/2021 11:52 AM    GFR est AA 26 (L) 05/25/2021 11:52 AM    GFR est non-AA 21 (L) 05/25/2021 11:52 AM    Calcium 8.7 05/25/2021 11:52 AM     Imaging:  CT Results (maximum last 3): Results from [place] encounter on 05/11/21    CT HEAD WO CONT    Narrative  EXAM:  CT HEAD WO CONT    INDICATION: Acute confusion. COMPARISON: CT 5/11/2021    FINDINGS:  There is diffuse age-related parenchymal volume loss. The ventricles  and sulci are age-appropriate without hydrocephalus. There is no mass effect or  midline shift. There is no intracranial hemorrhage or extra-axial fluid  collection. A probable subcentimeter right frontal meningioma is again noted. 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 There is no new abnormal parenchymal attenuation. The basal cisterns are patent. The osseous structures are intact. The visualized paranasal sinuses and mastoid  air cells are clear. Impression  No acute intracranial abnormality.       CT HEAD WO CONT    Narrative  INDICATION: AMS    EXAM:  HEAD CT WITHOUT CONTRAST    COMPARISON: November 11, 2020    FINDINGS:    Ventricles: Midline, no hydrocephalus. Intracranial Hemorrhage: None. Brain Parenchyma/Brainstem: Unchanged chronic infarctions right cerebellum and  right parietotemporal junction. Basal Cisterns: Normal.  Paranasal Sinuses: Visualized sinuses are clear. Additional Comments: 7 mm partly calcified extra-axial focus right frontal  convexity likely a small meningioma. Impression  No acute process. Results from [place] encounter on 11/09/20    CT HEAD WO CONT    Narrative  EXAM: CT HEAD WO CONT    INDICATION: AMS, involuntary muscular movement    COMPARISON: None. CONTRAST: None. FINDINGS:  Ventricles and sulci are within normal limits. No hemorrhage or acute infarction  is identified. Is 1.1 cm high density lesion based against the posterior frontal  bone most likely meningioma. The bone windows demonstrate no abnormalities. The visualized portions of the  paranasal sinuses and mastoid air cells are clear. Impression  IMPRESSION:  No acute abnormality identified. Incidentally noted is a 1.1 cm meningioma. Assessment:   Principal Problem:    Acute encephalopathy (5/11/2021)    Active Problems:    Severe sepsis with acute organ dysfunction ([facility] [address] (5/11/2021)      Plan: 1) Metabolic Encephalopathy with Myoclonic Tremors                   -Will F/U EEG reading and clinic exam                  -Continue Briviact 100mg BID                  -Continue to correct metabolic factors that can be contributing per primary team         Plan discussed with Dr. Isidra Perdomo and the primary RN. Ashley Allen NP  Neurocritical Care Nurse Practitioner    =========================================================    Neurology staff:  I examined the patient the bedside. I agree with the plans and documentation above from [facility]A [place]. Sukumar Amayaelor is a 53-year-old woman who was admitted May 11 for advanced heart failure.   Her hospital course was unremarkable for septic shock, respiratory failure and persistent metabolic encephalopathy. Neurology was consulted yesterday for concerns of twitching movements that she has had before leading up to dialysis. She had been diagnosed previously of metabolic myoclonus. Yesterday she had a deterioration of her respiratory status and was transferred to the ICU. She is now on BiPAP. Preliminary EEG was showing suspicious discharges. Continuous EEG done overnight pending review. I started her on Briviact yesterday. On exam she is somnolent. BiPAP in place. Daughter at the bedside. 71-year-old woman who is critically ill. Stay on [facility] for now. Awaiting the final EEG read. She is DNR now which is appropriate. I spoke with her daughter at length at the bedside. She is high risk to deteriorate and her daughter understands this. Following along.   [address] [facility],   Neurologist  Diplomate, American Board of Psychiatry and Neurology  Board Certified, Adult Neurology and Brain Injury Medicine

## 2021-05-26 NOTE — ROUTINE PROCESS
1200: to 2 L. Patient alert and oriented most of time. Periods of confusion    Hospice in to speak with the daughter. Will get back to them after Dr Dewayne Aguilera in to speak with the daughter about stopping dialysis    1330: Bedside shift change report given to [address] (oncoming nurse) by Romina Proctor RN (offgoing nurse). Report included the following information SBAR, Kardex, Intake/Output, MAR and Recent Results.

## 2021-05-26 NOTE — HOSPICE
[facility] RN note:  Consult noted. Reviewing chart. Discussed pt with staff RN Henry Atwood. Hospice NP Anisa Zafar and this RN met with pt's daughter outside pt room. Extensive conversation around current clinical status with multi organ failure. Daughter is clear that she does not want her mother to suffer, DNR order placed today. Daughter wanting final confirmation form nephrology before deciding to D/C HD, staff RN Henry Atwood to communicate with Dr Nehal Chan. Pt is appropriate for inpt admission to hospice at [facility] level of care per Dr Wendy Kramer. Daughter seeking input from  and other family members before making final decision for hospice. Information packet with 24hr contact information provided. [id number] will continue to follow as pt's clinical status evolves and support daughter as she navigates complex decisions. Thank you for the opportunity to care for this pt and family. Please contact hospice at [phone] with any questions or concerns.

## 2021-05-26 NOTE — PROGRESS NOTES
2000: Bedside and Verbal shift change report received by Scott Napoles. Report included the following information SBAR, OR Summary, Intake/Output, MAR, Recent Results, Med Rec Status, Cardiac Rhythm V pacd  and Alarm Parameters . Pt laying in bed, unable to follow commands, AMS, & impulsive scratching. Trilogy BiPAP machine in place, w/ 7L O2 bled into system - pt sating %. 2010: Neuro NP at bedside, no new orders at this time. 2021: Pt restless, increased precedex drip (1) and rate verified w/ Liliana Adamson., RN    [id number]: Doppler MAP 80 & auto cuff MAP 80. Will continue to check hourly cuff MAPs and q4h doppler MAPs. 2137: Spoke with Dr. Carbajal Oriental regarding hydralazine PO dose. Okay to hold because pt has no route of administration and has PRN IV dose. 2307: Repositioned pt, more interactive and arousable. Eyes open to voice and bilateral upper and lower limb command following. 2348: Pt less interactive during assessment, but does withdraw to pain. 0020: Pt maintaining O2 sats 100%, weaned bled in O2 to 5L. 0200: Intermittent/delayed command following, does not open eyes. Lifting arms and legs to commands during bath. 2035: Pt maintaining O2 sats 100%, weaned bled in O2 to 3L. 0451: Pt increasingly restless in bed, scratching sides frequently. Unable to be redircted. Zinc applied. Increased Dex (1.2).     0526: Pt moderately sedated, less interactive. Doppler MAP 74 - Precedex decreased to 1.     0639: Pt resting in bed comfortably, dex decreased to 0.8. Attempted to wean Triology bled in O2 support, sats 90-92. Increased to 2L NC.     0745: Bedside and Verbal shift change report given to Tisha RN (oncoming nurse) by Brady Marsh RN (offgoing nurse). Report included the following information SBAR, Procedure Summary, Intake/Output, MAR, Recent Results, Med Rec Status, Cardiac Rhythm V paced and Alarm Parameters Dex (0.8) verified.  Pt resting comfortably in bed with eyes closed with Trilogy mask on 2L O2 NC - O2 sats 94-97%.

## 2021-05-26 NOTE — PROGRESS NOTES
Physical Therapy    Reviewed chart and noted pt is not currently appropriate for therapy at this time. Will defer and continue to follow.

## 2021-05-26 NOTE — PROGRESS NOTES
[facility] in [place], [place]  Inpatient Consult Progress Note      Patient name: Gio Smith  Patient : 1950  Patient MRN: [id number]  Consulting MD: Angela Euceda DO    Date of service: 21    REASON FOR CONSULT:  Candida sepsis     PLAN:  Hold discharge due to change of mental status  Made DNR/DNI; patient needs ICD deactivated  IP consult for hospice     EKG today   Will check with pharmacy about paxil, ranexa and tafamidis causing underlying metabolic changes   Continue set speed 9400rpm, low speed 9000rpm   Continue hydralazine 100mg TID and imdur to 30mg twice daily  Cont Tafamidis 61mg daily  Intolerant to BB/ACEi/ARB/ARNI due to aTTR  Intolerant to MRA due to hyperkalemia  Volume management per Nephrology; strict I/O, standing weights  Pulmonary hygiene including IS and nebulizers   ID recommendations appreciated for C. Albicans in urine and sputum   Blood cultures negative   Drive line culture- rare diphtheroids   Coumadin dose per pharmacy; goal INR lowered to 1.8-2.5 due to GIB  Cont home keflex dose- d/w with ID  Appreciate EP recommendations- no changes to current therapy.    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 Susan Field)  · C/b delayed extubation due to severe COPD  · C/b critical illness polyneuropathy  · C/b prolonged hospitalization post-LVAD, 55 days  · C/b sternal wound infection s/p debridement (by Dr. Edna Eagle) s/p wound vac  · C/b sacral ulcer  · Would culture positive for Staph aureus, not MRSA  · C/b LVAD site drainage, improved  S/p CRT-ICD  · ICD fired due to electrolyte imbalance (2011)  H/o breast cancer (1992)   · s/p bilateral mastectomy/chemo and endometrial cancer s/p hysterectomy  · Lymphedema of LLE due to cancer treatment  Severe COPD with FEV1 50%  Depression  Atrial fibrillation  H/o \"two mini strokes\"  S/p fall with hip facture   · Right hip hemmiarthroplasty (5/23/18) by Dr. Elis Lopez)  · S/p removed hip hardwarare due to pain (4/15/19)  COPD severe  CKD, stage 4- on HD  Hyperkalemia, resolved  Pulmonary hypertension  Cardiac risk factors:  · Morbid obesity, Body mass index is 42.29 kg/m². · DM2 insulin dependent  · JED on Trilogy  · HL  Urinary incontinence, severe  · no procedures due to anticoagulation  Endometrial cancer (2002)  HTN  HL     Recent Events:  Moved to [facility] to monitor airway after mental status change   Started on precedex  EEG pending- started on Briviact per neurology        CARDIAC IMAGING:  TTE 5/11/21 LVEF < 15%, LVIDd 5.96cm, TAPSE 0.99cm, RVIDd 4.14cm  TTE 4/19 shows improved LVIDd, 6.68 cm with RVIDd 5.14 cm and TAPSE 1.1 cm   TTE 4/28/21 LVIDd 7.37cm, RVIDd 5.27cm, TAPSE 1.44cm   Echo (9/24/19) LVEF 20-25%, AV opens 1:1, no AR  Echo (5/29/18) LVEF 10-%, ramps study done, report in epic  Echo (1/9/18) ramp study done, LVEDD 7.1cm  LHC (11/9/18) 2 vessel disease with 90% OM, 80% PDA, DSA to PDA branch  RHC 4/20 showed adequate Sal CI 3.0 but severely elevated RA pressure 24 mmHg  CXR negative 4/27/21     INTERVAL HISTORY:  Ms. Kiran Chol is Quintus.B[age] y.o.  Female s/p LVAD implant with HM 2 and ESRD requiring HD that was recently admitted for RV failure and worsening renal function.  She was discharged on 5/5/21 to [facility] for rehabilitation before transitioning home to live her with daughter. In the last few days she had worsening mental status changed, decreased UOP and respiratory distress after possible aspiration- she was intubated and admitted to [facility] for further management. It appears patient condition was triggered by candida UTI and likely intolerance of BIPAP - inadequately supporting ventilation in rehab - patient is dependent on Trilogy support. LVAD INTERROGATION:  Device interrogated in person  Device function normal, normal flow, no events  LVAD   Pump Speed (RPM): 9400  Pump Flow (LPM): 4.1  MAP: 82  PI (Pulsitility Index): 4.3  Power: 5.2   Test: Yes  Back Up  at Bedside & Labeled: Yes  Power Module Test: No  Driveline Site Care: No  Driveline Dressing: Intact  Outpatient: No  MAP in Therapeutic Range (Outpatient): Yes  Testing  Alarms Reviewed: Yes  Back up SC speed: 9400  Back up Low Speed Limit: 9000  Emergency Equipment with Patient?: Yes  Emergency procedures reviewed?: Yes  Drive line site inspected?: No (covered by dressing )  Drive line intergrity inspected?: Yes  Drive line dressing changed?: No    PHYSICAL EXAM:  Visit Vitals  BP (!) 81/69   Pulse 70   Temp 97.4 °F (36.3 °C)   Resp 17   Ht 5' 7\" (1.702 m)   Wt 226 lb 14.4 oz (102.9 kg)   SpO2 100%   BMI 35.54 kg/m²     General: Patient is well developed, well-nourished in no acute distress  HEENT: Normocephalic and atraumatic. No scleral icterus. Pupils are equal, round and reactive to light and accomodation. No conjunctival injection. Oropharynx is clear. Neck: Supple. No evidence of thyroid enlargements or lymphadenopathy. JVD: Cannot be appreciated   Lungs: Breath sounds are equal and clear bilaterally. No wheezes, rhonchi, or rales. Heart: Regular rate and rhythm with normal S1 and S2. No murmurs, gallops or rubs. Abdomen: Soft, no mass or tenderness. No organomegaly or hernia. Bowel sounds present.   Genitourinary and rectal: deferred  Extremities: No cyanosis, clubbing, or edema. Neurologic: Unable to determine  Psychiatric: Sleeping with BIPAP on; difficult to arouse  Skin: Warm, dry and well perfused. No lesions, nodules or rashes are noted. REVIEW OF SYSTEMS:  General: Unable to obtain. PAST MEDICAL HISTORY:  Past Medical History:   Diagnosis Date    Asthma     [facility]     breast    [facility]     endometrial    Congestive heart failure, unspecified     CRI (chronic renal insufficiency)     Depression     Diabetes [facility]     Hypertension     Severe sepsis with acute organ dysfunction ([facility] 5/11/2021       PAST SURGICAL HISTORY:  Past Surgical History:   Procedure Laterality Date    HX HERNIA REPAIR      HX HYSTERECTOMY      HX MASTECTOMY      IR INSERT NON TUNL CVC OVER 5 YRS  4/26/2021    IR INSERT TUNL CVC W/O PORT OVER 5 YR  5/5/2021       FAMILY HISTORY:  No family history on file. SOCIAL HISTORY:  Social History     Socioeconomic History    Marital status:      Spouse name: Not on file    Number of children: Not on file    Years of education: Not on file    Highest education level: Not on file   Tobacco Use    Smoking status: Never Smoker    Smokeless tobacco: Never Used   Substance and Sexual Activity    Alcohol use: Not Currently     Social Determinants of Health     Financial Resource Strain:     Difficulty of Paying Living Expenses:    Food Insecurity:     Worried About Running Out of Food in the Last Year:     [address] in the Last Year:    Transportation Needs:     Lack of Transportation (Medical):      Lack of Transportation (Non-Medical):    Physical Activity:     Days of Exercise per Week:     Minutes of Exercise per Session:    Stress:     Feeling of Stress :    Social Connections:     Frequency of Communication with Friends and Family:     Frequency of Social Gatherings with Friends and Family:     Attends Restorationist Services:     Active Member of Clubs or Organizations:     Attends Club or Organization Meetings:     Marital Status:        LABORATORY RESULTS:     Labs Latest Ref Rng & Units 5/26/2021 5/25/2021 5/25/2021 5/24/2021 5/23/2021 5/22/2021 5/21/2021   WBC 3.6 - 11.0 K/uL 2. 8(L) - 4.7 4.6 4.2 4.2 4.1   RBC 3.80 - 5.20 M/uL 2.98(L) - 3.12(L) 3.22(L) 3.26(L) 2.97(L) 3.18(L)   Hemoglobin 11.5 - 16.0 g/dL 8. 3(L) - 8. 6(L) 8.7(L) 8. 9(L) 8. 1(L) 8.6(L)   Hematocrit 35.0 - 47.0 % 28. 1(L) - 28. 7(L) 29. 7(L) 29. 8(L) 27. 7(L) 29. 0(L)   MCV 80.0 - 99.0 FL 94.3 - 92.0 92.2 91.4 93.3 91.2   Platelets 884 - 635 K/uL 132(L) - 130(L) 129(L) 139(L) 133(L) 152   Lymphocytes 12 - 49 % 22 - 11(L) 11(L) 10(L) 11(L) 11(L)   Monocytes 5 - 13 % 11 - 9 9 8 8 9   Eosinophils 0 - 7 % 0 - 0 0 0 0 0   Basophils 0 - 1 % 1 - 1 1 1 1 1   Bands 0 - 6 % - - - - - - -   Albumin 3.5 - 5.0 g/dL 3. 1(L) 3. 3(L) 3. 2(L) 3.0(L) 3.0(L) 2. 9(L) 2. 9(L)   Calcium 8.5 - 10.1 MG/DL 9.4 8.7 9.2 8.9 8.8 9.0 8.8   Glucose 65 - 100 mg/dL 134(H) 123(H) 121(H) 119(H) 112(H) 125(H) 142(H)   BUN 6 - 20 MG/DL 16 10 27(H) 22(H) 14 28(H) 20   Creatinine 0.55 - 1.02 MG/DL 3.48(H) 2.27(H) 5.11(H) 4.15(H) 3.14(H) 4.97(H) 3.45(H)   Sodium 136 - 145 mmol/L 139 137 137 134(L) 136 135(L) 136   Potassium 3.5 - 5.1 mmol/L 3.9 3.4(L) 3. 3(L) 3.6 3.8 3.9 3.7   TSH 0.36 - 3.74 uIU/mL - - - - - - -   LDH 81 - 246 U/L - - 197 194 210 192 214   Some recent data might be hidden     Lab Results   Component Value Date/Time    TSH 1.60 05/15/2021 03:14 AM    TSH 5.36 (H) 04/24/2021 04:32 AM    TSH 3.99 (H) 04/17/2021 04:54 AM    TSH 2.980 10/01/2020 12:00 AM       ALLERGY:  Allergies   Allergen Reactions    Atarax [Hydroxyzine Hcl] Drowsiness    Benzodiazepines Other (comments)     Respiratory issues        CURRENT MEDICATIONS:    Current Facility-Administered Medications:     warfarin (COUMADIN) tablet 2.5 mg, 2.5 mg, Oral, ONCE, Migel Lynn DO    famotidine (PF) (PEPCID) 20 mg in 0.9% sodium chloride 10 mL injection, 20 mg, IntraVENous, Q12H, Migel Lynn DO   hydrALAZINE (APRESOLINE) 20 mg/mL injection 20 mg, 20 mg, IntraVENous, Q6H PRN, Migel Lynn DO    dexmedeTOMidine in 0.9 % NaCl (PRECEDEX) 400 mcg/100 mL (4 mcg/mL) infusion soln, 0.1-1.5 mcg/kg/hr, IntraVENous, TITRATE, Migel Lynn DO, Last Rate: 21.2 mL/hr at 05/26/21 0825, 0.8 mcg/kg/hr at 05/26/21 0825    brivaracetam (BRIVIACT) injection 100 mg, 100 mg, IntraVENous, Q12H, GiovanaAlbert K, , 100 mg at 05/26/21 0515    alteplase (CATHFLO) 2 mg in sterile water (preservative free) 2 mL injection, 2 mg, InterCATHeter, PRN, Clifton Wynn MD    isosorbide mononitrate ER (IMDUR) tablet 30 mg, 30 mg, Oral, BID, Grant Christian MD, 30 mg at 05/24/21 1748    L.acidophilus-paracasei-S.thermophil-bifidobacter (RISAQUAD) 8 billion cell capsule, 1 Capsule, Oral, DAILY, CORWIN Ward MD, 1 Capsule at 05/24/21 0955    loperamide (IMODIUM) capsule 2 mg, 2 mg, Oral, Q4H PRN, CARLOZ WardHGALEXEYPI MD MECHE    albuterol-ipratropium (DUO-NEB) 2.5 MG-0.5 MG/3 ML, 3 mL, Nebulization, BID RT, Grant Christian MD, 3 mL at 05/26/21 0754    simethicone (MYLICON) tablet 80 mg, 80 mg, Oral, QID PRN, Ed LIQPNCHERYL MCGREGOR MD    epoetin amalia-epbx (RETACRIT) injection 20,000 Units, 20,000 Units, SubCUTAneous, Q TUE, THU & SAT, Dat Miranda MD, 20,000 Units at 05/25/21 2120    artificial saliva (MOUTH KOTE) 1 Spray, 1 Spray, Oral, PRN, BraxtonTriny B, NP    cephALEXin (KEFLEX) capsule 250 mg, 250 mg, Oral, BID, BraxtonTriny cheatham, NP, 250 mg at 05/24/21 2120    PARoxetine (PAXIL) tablet 20 mg, 20 mg, Oral, DAILY, Festus Ribeiro MD, 20 mg at 05/24/21 0956    hydrocortisone (CORTAID) 1 % cream, , Topical, PRN, Festus Ribeiro MD, Given at 05/24/21 1746    glucose chewable tablet 16 g, 4 Tablet, Oral, PRN, Elda Pineda MD    dextrose (D50W) injection syrg 12.5-25 g, 25-50 mL, IntraVENous, PRN, Elda Pineda MD    glucagon [place] [facility]) injection 1 mg, 1 mg, IntraMUSCular, PRN, Boby Rojas MD  [facility] insulin lispro (HUMALOG) injection, , SubCUTAneous, AC&HS, Steven Tompkins MD, 2 Units at 05/24/21 0956    benzocaine-zinc cl-benzalkonium cl (ORAJEL) 20-0.1-0.02 % mucosal gel, , Oral, PRN, Denise Lei MD    magic mouthwash cpd (without sucralfate), 5 mL, Oral, DAILY PRN, Denise Lei MD, 5 mL at 05/19/21 1507    balsam peru-castor oiL (VENELEX) ointment, , Topical, BID, Denise Lei MD, Given at 05/25/21 2026    albumin human 25% (BUMINATE) solution 50 g, 50 g, IntraVENous, DIALYSIS PRN, Jaspreet Mendoza MD    heparin (porcine) 1,000 unit/mL injection 1,900 Units, 1,900 Units, InterCATHeter, DIALYSIS PRN, 1,900 Units at 05/25/21 1045 **AND** heparin (porcine) 1,000 unit/mL injection 1,900 Units, 1,900 Units, InterCATHeter, DIALYSIS PRN, Stacey Garner MD, 1,900 Units at 05/25/21 1044    hydrALAZINE (APRESOLINE) 20 mg/mL injection 20 mg, 20 mg, IntraVENous, Q6H PRN, Braxton, Triny B, NP, 20 mg at 05/24/21 0010    hydrALAZINE (APRESOLINE) 20 mg/mL injection 10 mg, 10 mg, IntraVENous, Q6H PRN, Braxton, Triny B, NP, 10 mg at 05/25/21 0955    [Held by provider] hydrALAZINE (APRESOLINE) tablet 100 mg, 100 mg, Oral, TID, Braxton, Triny B, NP, 100 mg at 05/24/21 1748    tafamidis cap 61 mg (Patient Supplied), 61 mg, Oral, DAILY, Denise Lei MD, 61 mg at 05/24/21 0955    ondansetron (ZOFRAN) injection 4 mg, 4 mg, IntraVENous, Q4H PRN, Valeriy Morrow MD, 4 mg at 05/23/21 1216    zinc oxide-cod liver oil (DESITIN) 40 % paste, , Topical, Q12H, See Serna MD, Given at 05/25/21 2141    sodium chloride (NS) flush 5-10 mL, 5-10 mL, IntraVENous, PRN, Susanne Sun MD    sodium chloride (NS) flush 5-40 mL, 5-40 mL, IntraVENous, Q8H, Sameer Jarrett MD, 10 mL at 05/26/21 0517    sodium chloride (NS) flush 5-40 mL, 5-40 mL, IntraVENous, PRN, Sameer Jarrett MD    acetaminophen (TYLENOL) tablet 650 mg, 650 mg, Oral, Q6H PRN **OR** acetaminophen (TYLENOL) suppository 650 mg, 650 mg, Rectal, Q6H PRN, Gladis Booker MD    Warfarin - Pharmacy to Dose, , Other, Rx Dosing/Monitoring, Rafia Castillo NP    sodium chloride (NS) flush 5-40 mL, 5-40 mL, IntraVENous, Q8H, Braxton, Triny B, NP, 10 mL at 05/26/21 0516    sodium chloride (NS) flush 5-40 mL, 5-40 mL, IntraVENous, PRN, Braxton, Triny B, NP, 10 mL at 05/23/21 1216    nystatin (MYCOSTATIN) 100,000 unit/gram powder, , Topical, PRN, Fracisco Gaines MD    PATIENT CARE TEAM:  Patient Care Team:  Radha Aj NP as PCP - General (Nurse Practitioner)  Sarah Pat MD (Cardiology)  Julio C Enrique MD as Physician (Cardiology)  Mundo Morrell LPN as Care Coordinator  Nicola Krishnan MD (Cardiothoracic Surgery)  Darlin Villalba MD (Cardiology)     Thank you for allowing me to participate in this patient's care. Radha Pisano NP  [facility] [facility]  [address], [address]  Phone: [phone]      [facility] ATTENDING ADDENDUM    Patient was seen and examined in person. Data and notes were reviewed. I have discussed and agree with the plan as noted in the NP note above without further additions.     Kenneth Guy MD PhD  [address]

## 2021-05-26 NOTE — WOUND CARE
WOCN Note:      Follow assessment of buttocks, sacrum and bilateral low legs.     Assessed in room 4333.   Chart reviewed. Admitted DX:  Acute encephalopathy          Past Medical History:   Diagnosis Date    Asthma      Cancer ([facility]       breast    Cancer ([facility]       endometrial    Congestive heart failure, unspecified      CRI (chronic renal insufficiency)      Depression      Diabetes (HCC)      Hypertension        Assessment:   Patient is alert and on bipap. She requires assist of 2 with repostioning  Bed: garret HEATHER  Heels intact without erythema and floated on pillows. Sacrum intact without erythema.     1.  Bilateral buttocks have blanching red/purple erythema. Applied Venelex. 2.  Bilateral low legs dry scabs. 3.  Groin and abdominal folds:  Resolving MASD.     Wound, Pressure Prevention & Skin Care Recommendations:    1. Minimize layers of linen/pads under patient to optimize support surface.    2.  Turn/reposition approximately every 2 hours and offload heels. 3.  Manage moisture/ Keep skin folds clean and dry.   4.  Groin and abdominal folds:  Cleanse, dry and apply Desitin Zinc cream.  5.  Buttocks:  Venelex TID.       Discussed above plan with Tisha CAUSEY.     Transition of Care: Plan to follow as needed while admitted to hospital.     ANGY Covarrubias RN [facility] Inpatient Wound Care  Available on Perfect Serve  Pager [phone]  Office [phone]

## 2021-05-26 NOTE — PROGRESS NOTES
[facility]   [address] [facility], [address] [place] [address] Rd [address], [facility]  Phone: [phone]   [phone]       Nephrology Progress Note  Ulisses Johnson     1950     [id number]  Date of Admission : 5/11/2021 05/26/21    CC: Follow up for ROCIO    Assessment and Plan   ROCIO on CKD:  - 2/2 progressive CKD and CRS  - dialysis initiated 4/26/21. Has RIJ permacath for access. - HD TTS for now  - working on outpt unit placement at [employer]    Acute on chronic Hypoxic Resp failure :  - baseline severe COPD + chronic CHF + morbid obesity/ JED   - Pulm HTN   - extubated 5/13     Chronic systolic CHF, stage C NYHA class IV  Combined ischemic and nonischemic cardiomyopathy  S/p HM 2 LVAD implant 4/15/2017 by Dr. Alize Zhang at [facility]  hx of recurrent VT : off mexiletine  chronic RV failure. 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 - per AHF team      Chronic anemia :   ATTR amyloidosis  - anemia of chronic disease   - continue HEIKE  MWF     Seizure:  - on Briviact  - neurology following    Chronic A. Fib. History of endometrial Ca       Interval History:  Seen and examined. Transferred to CVICU after sz. Received dialysis yesterday, 800cc UF. Pt on BiPAP, sedated this AM.  Continuous EEG underway    Review of Systems: Pertinent items are noted in HPI.     Current Medications:   Current Facility-Administered Medications   Medication Dose Route Frequency    dexmedeTOMidine in 0.9 % NaCl (PRECEDEX) 400 mcg/100 mL (4 mcg/mL) infusion soln  0.1-1.5 mcg/kg/hr IntraVENous TITRATE    brivaracetam (BRIVIACT) injection 100 mg  100 mg IntraVENous Q12H    alteplase (CATHFLO) 2 mg in sterile water (preservative free) 2 mL injection  2 mg InterCATHeter PRN    isosorbide mononitrate ER (IMDUR) tablet 30 mg  30 mg Oral BID    L.acidophilus-paracasei-S.thermophil-bifidobacter (RISAQUAD) 8 billion cell capsule  1 Capsule Oral DAILY    loperamide (IMODIUM) capsule 2 mg  2 mg Oral Q4H PRN    albuterol-ipratropium (DUO-NEB) 2.5 MG-0.5 MG/3 ML  3 mL Nebulization BID RT    simethicone (MYLICON) tablet 80 mg  80 mg Oral QID PRN    epoetin amalia-epbx (RETACRIT) injection 20,000 Units  20,000 Units SubCUTAneous Q TUE, THU & SAT    artificial saliva (MOUTH KOTE) 1 Spray  1 Spray Oral PRN    cephALEXin (KEFLEX) capsule 250 mg  250 mg Oral BID    PARoxetine (PAXIL) tablet 20 mg  20 mg Oral DAILY    hydrocortisone (CORTAID) 1 % cream   Topical PRN    glucose chewable tablet 16 g  4 Tablet Oral PRN    dextrose (D50W) injection syrg 12.5-25 g  25-50 mL IntraVENous PRN    glucagon (GLUCAGEN) injection 1 mg  1 mg IntraMUSCular PRN    insulin lispro (HUMALOG) injection   SubCUTAneous AC&HS    famotidine (PEPCID) tablet 20 mg  20 mg Oral DAILY    benzocaine-zinc cl-benzalkonium cl (ORAJEL) 20-0.1-0.02 % mucosal gel   Oral PRN    magic mouthwash cpd (without sucralfate)  5 mL Oral DAILY PRN    balsam peru-castor oiL (VENELEX) ointment   Topical BID    albumin human 25% (BUMINATE) solution 50 g  50 g IntraVENous DIALYSIS PRN    heparin (porcine) 1,000 unit/mL injection 1,900 Units  1,900 Units InterCATHeter DIALYSIS PRN    And    heparin (porcine) 1,000 unit/mL injection 1,900 Units  1,900 Units InterCATHeter DIALYSIS PRN    hydrALAZINE (APRESOLINE) 20 mg/mL injection 20 mg  20 mg IntraVENous Q6H PRN    hydrALAZINE (APRESOLINE) 20 mg/mL injection 10 mg  10 mg IntraVENous Q6H PRN    ranolazine ER (RANEXA) tablet 1,000 mg  1,000 mg Oral BID    hydrALAZINE (APRESOLINE) tablet 100 mg  100 mg Oral TID    tafamidis cap 61 mg (Patient Supplied)  61 mg Oral DAILY    ondansetron (ZOFRAN) injection 4 mg  4 mg IntraVENous Q4H PRN    zinc oxide-cod liver oil (DESITIN) 40 % paste   Topical Q12H    sodium chloride (NS) flush 5-10 mL  5-10 mL IntraVENous PRN    sodium chloride (NS) flush 5-40 mL  5-40 mL IntraVENous Q8H    sodium chloride (NS) flush 5-40 mL  5-40 mL IntraVENous PRN    acetaminophen (TYLENOL) tablet 650 mg  650 mg Oral Q6H PRN    Or    acetaminophen (TYLENOL) suppository 650 mg  650 mg Rectal Q6H PRN    Warfarin - Pharmacy to Dose   Other Rx Dosing/Monitoring    sodium chloride (NS) flush 5-40 mL  5-40 mL IntraVENous Q8H    sodium chloride (NS) flush 5-40 mL  5-40 mL IntraVENous PRN    nystatin (MYCOSTATIN) 100,000 unit/gram powder   Topical PRN      Allergies   Allergen Reactions    Atarax [Hydroxyzine Hcl] Drowsiness    Benzodiazepines Other (comments)     Respiratory issues       Objective:  Vitals:    Vitals:    05/26/21 0535 05/26/21 0600 05/26/21 0601 05/26/21 0700   BP: (!) 82/63  91/72 94/81   Pulse: 70 70  70   Resp: 15 17  15   Temp:       TempSrc:       SpO2: 98% 96%  95%   Weight:       Height:         Intake and Output:  No intake/output data recorded. 05/24 1901 - 05/26 0700  In: 395.1 [P.O.:50; I.V.:345.1]  Out: 798     Physical Examination:    General: Obese, on BiPAP  HEENT            EOMI  Neck:  permacath +  Resp:  Lungs clear B/L, no wheezing   CV:  RRR,  S1,S2  GI:  Soft, NT, + BS  Neurologic:  confusion  Psych:             Unable to assess this AM      []    High complexity decision making was performed  []    Patient is at high-risk of decompensation with multiple organ involvement    Lab Data Personally Reviewed: I have reviewed all the pertinent labs, microbiology data and radiology studies during assessment.     Recent Labs     05/26/21  0255 05/25/21  1152 05/25/21  0349 05/24/21  0017    137 137 134*   K 3.9 3.4* 3.3* 3.6    102 100 99   CO2 27 27 25 27   * 123* 121* 119*   BUN 16 10 27* 22*   CREA 3.48* 2.27* 5.11* 4.15*   CA 9.4 8.7 9.2 8.9   MG 2.5* 2.2 2.4 2.1   PHOS 3.3  --  4.8* 3.8   ALB 3.1* 3.3* 3.2* 3.0*   ALT 8* 7* 8* 7*   INR 1.9*  --  1.5* 1.3*     Recent Labs     05/26/21  0255 05/25/21  0349 05/24/21  0017   WBC 2.8* 4.7 4.6   HGB 8.3* 8.6* 8.7*   HCT 28.1* 28.7* 29.7*   * 130* 129*     No results found for: SDES  Lab Results Component Value Date/Time    Culture result: CANDIDA ALBICANS (A) 05/13/2021 11:29 AM    Culture result: RARE DIPHTHEROIDS (A) 05/12/2021 04:29 AM    Culture result: LIGHT YEAST, (APPARENT CANDIDA ALBICANS) (A) 05/11/2021 07:09 PM    Culture result: LIGHT NORMAL RESPIRATORY BETO 05/11/2021 07:09 PM    Culture result: NO GROWTH 5 DAYS 05/11/2021 04:30 PM     Recent Results (from the past 24 hour(s))   POC EG7    Collection Time: 05/25/21 11:44 AM   Result Value Ref Range    Calcium, ionized (POC) 1.13 1.12 - 1.32 mmol/L    pH (POC) 7.37 7.35 - 7.45      pCO2 (POC) 47.7 (H) 35.0 - 45.0 MMHG    pO2 (POC) 49 (LL) 80 - 100 MMHG    HCO3 (POC) 27.8 (H) 22 - 26 MMOL/L    Base excess (POC) 2.0 mmol/L    sO2 (POC) 82.3 (L) 92 - 97 %    Site RIGHT RADIAL      Device: CPAP      Allens test (POC) Positive      Specimen type (POC) ARTERIAL      Critical value read back [facility]    METABOLIC PANEL, COMPREHENSIVE    Collection Time: 05/25/21 11:52 AM   Result Value Ref Range    Sodium 137 136 - 145 mmol/L    Potassium 3.4 (L) 3.5 - 5.1 mmol/L    Chloride 102 97 - 108 mmol/L    CO2 27 21 - 32 mmol/L    Anion gap 8 5 - 15 mmol/L    Glucose 123 (H) 65 - 100 mg/dL    BUN 10 6 - 20 MG/DL    Creatinine 2.27 (H) 0.55 - 1.02 MG/DL    BUN/Creatinine ratio 4 (L) 12 - 20      GFR est AA 26 (L) >60 ml/min/1.73m2    GFR est non-AA 21 (L) >60 ml/min/1.73m2    Calcium 8.7 8.5 - 10.1 MG/DL    Bilirubin, total 0.6 0.2 - 1.0 MG/DL    ALT (SGPT) 7 (L) 12 - 78 U/L    AST (SGOT) 19 15 - 37 U/L    Alk.  phosphatase 56 45 - 117 U/L    Protein, total 7.8 6.4 - 8.2 g/dL    Albumin 3.3 (L) 3.5 - 5.0 g/dL    Globulin 4.5 (H) 2.0 - 4.0 g/dL    A-G Ratio 0.7 (L) 1.1 - 2.2     LACTIC ACID    Collection Time: 05/25/21 11:52 AM   Result Value Ref Range    Lactic acid 1.1 0.4 - 2.0 MMOL/L   MAGNESIUM    Collection Time: 05/25/21 11:52 AM   Result Value Ref Range    Magnesium 2.2 1.6 - 2.4 mg/dL   GLUCOSE, POC    Collection Time: 05/25/21 12:01 PM   Result Value Ref Range    Glucose (POC) 124 (H) 65 - 117 mg/dL    Performed by Jeri Epperson  PCT    PROCALCITONIN    Collection Time: 05/25/21  1:43 PM   Result Value Ref Range    Procalcitonin 0.43 ng/mL   NT-PRO BNP    Collection Time: 05/25/21  1:43 PM   Result Value Ref Range    NT pro-BNP 21,901 (H) <125 PG/ML   POC EG7    Collection Time: 05/25/21  2:15 PM   Result Value Ref Range    Calcium, ionized (POC) 1.16 1.12 - 1.32 mmol/L    pH (POC) 7.41 7.35 - 7.45      pCO2 (POC) 38.3 35.0 - 45.0 MMHG    pO2 (POC) 74 (L) 80 - 100 MMHG    HCO3 (POC) 24.5 22 - 26 MMOL/L    Base excess (POC) 0.0 mmol/L    sO2 (POC) 95.0 92 - 97 %    Site RIGHT RADIAL      Device: CPAP      Allens test (POC) Positive      Specimen type (POC) ARTERIAL     GLUCOSE, POC    Collection Time: 05/25/21  9:42 PM   Result Value Ref Range    Glucose (POC) 124 (H) 65 - 117 mg/dL    Performed by Leila Wetzel    MAGNESIUM    Collection Time: 05/26/21  2:55 AM   Result Value Ref Range    Magnesium 2.5 (H) 1.6 - 2.4 mg/dL   NT-PRO BNP    Collection Time: 05/26/21  2:55 AM   Result Value Ref Range    NT pro-BNP 24,036 (H) <125 PG/ML   PHOSPHORUS    Collection Time: 05/26/21  2:55 AM   Result Value Ref Range    Phosphorus 3.3 2.6 - 4.7 MG/DL   CBC WITH AUTOMATED DIFF    Collection Time: 05/26/21  2:55 AM   Result Value Ref Range    WBC 2.8 (L) 3.6 - 11.0 K/uL    RBC 2.98 (L) 3.80 - 5.20 M/uL    HGB 8.3 (L) 11.5 - 16.0 g/dL    HCT 28.1 (L) 35.0 - 47.0 %    MCV 94.3 80.0 - 99.0 FL    MCH 27.9 26.0 - 34.0 PG    MCHC 29.5 (L) 30.0 - 36.5 g/dL    RDW 19.6 (H) 11.5 - 14.5 %    PLATELET 733 (L) 486 - 400 K/uL    MPV 9.0 8.9 - 12.9 FL    NRBC 0.0 0  WBC    ABSOLUTE NRBC 0.00 0.00 - 0.01 K/uL    NEUTROPHILS 65 32 - 75 %    LYMPHOCYTES 22 12 - 49 %    MONOCYTES 11 5 - 13 %    EOSINOPHILS 0 0 - 7 %    BASOPHILS 1 0 - 1 %    IMMATURE GRANULOCYTES 1 (H) 0.0 - 0.5 %    ABS. NEUTROPHILS 1.9 1.8 - 8.0 K/UL    ABS. LYMPHOCYTES 0.6 (L) 0.8 - 3.5 K/UL    ABS.  MONOCYTES 0.3 0.0 - 1.0 K/UL    ABS. EOSINOPHILS 0.0 0.0 - 0.4 K/UL    ABS. BASOPHILS 0.0 0.0 - 0.1 K/UL    ABS. IMM. GRANS. 0.0 0.00 - 0.04 K/UL    DF SMEAR SCANNED      RBC COMMENTS ANISOCYTOSIS  2+        RBC COMMENTS MACROCYTOSIS  1+        RBC COMMENTS POLYCHROMASIA  PRESENT       PROCALCITONIN    Collection Time: 05/26/21  2:55 AM   Result Value Ref Range    Procalcitonin 0.43 ng/mL   PROTHROMBIN TIME + INR    Collection Time: 05/26/21  2:55 AM   Result Value Ref Range    INR 1.9 (H) 0.9 - 1.1      Prothrombin time 18.9 (H) 9.0 - 42.4 sec   METABOLIC PANEL, COMPREHENSIVE    Collection Time: 05/26/21  2:55 AM   Result Value Ref Range    Sodium 139 136 - 145 mmol/L    Potassium 3.9 3.5 - 5.1 mmol/L    Chloride 104 97 - 108 mmol/L    CO2 27 21 - 32 mmol/L    Anion gap 8 5 - 15 mmol/L    Glucose 134 (H) 65 - 100 mg/dL    BUN 16 6 - 20 MG/DL    Creatinine 3.48 (H) 0.55 - 1.02 MG/DL    BUN/Creatinine ratio 5 (L) 12 - 20      GFR est AA 16 (L) >60 ml/min/1.73m2    GFR est non-AA 13 (L) >60 ml/min/1.73m2    Calcium 9.4 8.5 - 10.1 MG/DL    Bilirubin, total 0.6 0.2 - 1.0 MG/DL    ALT (SGPT) 8 (L) 12 - 78 U/L    AST (SGOT) 16 15 - 37 U/L    Alk. phosphatase 50 45 - 117 U/L    Protein, total 7.1 6.4 - 8.2 g/dL    Albumin 3.1 (L) 3.5 - 5.0 g/dL    Globulin 4.0 2.0 - 4.0 g/dL    A-G Ratio 0.8 (L) 1.1 - 2.2     GLUCOSE, POC    Collection Time: 05/26/21  7:15 AM   Result Value Ref Range    Glucose (POC) 114 65 - 117 mg/dL    Performed by Avel Lopez            Total time spent with patient:  xxx   min. Care Plan discussed with:  Patient     Family      RN      Consulting Physician [facility]        I have reviewed the flowsheets. Chart and Pertinent Notes have been reviewed. No change in PMH ,family and social history from Consult note.       Parvez Aden MD

## 2021-05-26 NOTE — PROGRESS NOTES
Bedside shift change report given to Jeanice Epley, RN (oncoming nurse) by Dana Alvarado RN (offgoing nurse). Report included the following information SBAR, Kardex, ED Summary, Intake/Output, MAR, Recent Results and Cardiac Rhythm Paced. 1400 Pt with episode of stool incontinence. Noted bleeding/crust in pt's mouth - provided mouth care   Per report, pt's daughter/POA is waiting to speak with MD Jones regarding plan of care/possible discontinuation of HD  1530 MD Jones to bedside - spoke with Kb Wilson. Plans to discontinue HD. Family would like to wait until evening visitors/other family members arrive before officially placing pt on hospice. 1600 Updated NP Kei Dust  6424 STAND completed; pt ok for PO meds  1715 Pt tolerated PO meds - comfortable; daughter at bedside    Bedside shift change report given to Ryanne Castañeda RN (oncoming nurse) by Jeanice Epley, RN (offgoing nurse). Report included the following information SBAR, Kardex, ED Summary, Intake/Output, MAR, Recent Results and Cardiac Rhythm Paced.

## 2021-05-26 NOTE — PROGRESS NOTES
Chart reviewed and noted patient transferred to a higher level of care following RRT yesterday during dialysis where patient became hypoxic with AMS. Pt now on BiPAP and sedated this am on continuous EEG. Per neurology note, patient not following commands and with metabolic encephalopathy and myoclonic tremors. Will follow up for OT re-evaluation as able. Thank you.

## 2021-05-27 NOTE — PROGRESS NOTES
NUTRITION brief  Recommendations:   Continue diet as tolerated for comfort       Diet: full liquids  Supplements/Nutrition Support: none    New events impacting nutrition plan of care: Change in status over past couple of days. Daughter now moving more towards comfort care with Hospice following for final decisions. No plans for further HD notes. Pt alert but more confused today. On full liquids diet. Continue as ordered while alert and appropriate. Aggressive nutrition intervention not indicated at this time with likely transition of care. See full RD assessment from 5/20 for additional details, goals, and monitoring/evaluation.    Estimated Nutrition Needs:   Energy: 2101-2292kcal (MSJ x 1.2-1-2.2)  Wt used: Current (103kg)  Protein: 123-144g (1.2-1.4g/kg)  Wt used: Current (103kg)   Fluid: 1500 - or per renal     Enrico Craft RD [facility], Pager #[phone] or via [employer]

## 2021-05-27 NOTE — PROGRESS NOTES
Hospice attending    Chart reviewed and discussed with bedside nurse as well as hospice Mikel Toure. Patient with a complicated history to include end-stage heart failure as patient with LVAD. Patient also with end-stage renal disease and dialysis was attempted but unfortunately did not tolerate with evidence of seizures, confusion, hypotension. Patient with acute respiratory failure and weaned from BiPAP yesterday. Patient has been started on antiseizure medication and although confused today, she is interactive and able to answer some questions. She has been weaned completely off of oxygen. Our team has been asked to evaluate for admission to hospice. In discussion with bedside nurse prior to talking with patient and daughter, patient clearly not a candidate for ongoing dialysis per nephrology. Patient has had issues with chronic driveline infections, pulmonary hypertension, chronic right ventricular failure, acute on chronic hypoxic respiratory failure. Our team had a good discussion with patient and daughter at the bedside. Patient once again does not understand the full details of her medical issues. She did think [place] was approximately 4 weeks away. She was able to engage with discussion and is very clear that she would like to get home if all possible. We explained the support of hospice could provide. After this discussion and explaining that dialysis is no longer an option, patient and daughter agree on transition to hospice. Given the patient appears more stable today and wants to return home, we will see if we had a small window of opportunity to discharge patient tomorrow with ongoing hospice care. The plan will be to continue her LVAD for now.   We discussed extensively burdens versus benefits to include sometimes what we can provide such as LVAD and dialysis can be more burdensome than beneficial.  Clearly she is not a candidate for ongoing dialysis but for now appears to be tolerating her LVAD. We will need to have ongoing discussions based on her decline at home and how long to continue LVAD. We will begin arranging DME equipment to be delivered so the patient hopefully can be discharged home tomorrow. Sujey talamantes will discuss with her home team/admission team as well as case management. We reviewed the plan with patient's bedside nurse after our meeting.

## 2021-05-27 NOTE — PROGRESS NOTES
[facility] VAD/Heart Failure Progress Note    Admit Date: 2021  POD:  * No surgery found *    Procedure:          Subjective:   More alert today; confusion; good flows      Objective:   Vitals:  Blood pressure 117/88, pulse (!) 104, temperature 98.7 °F (37.1 °C), resp. rate 18, height 5' 7\" (1.702 m), weight 223 lb 4.8 oz (101.3 kg), SpO2 94 %. Temp (24hrs), Av.9 °F (37.2 °C), Min:98.5 °F (36.9 °C), Max:99.9 °F (37.7 °C)    Hemodynamics:   CO:     CI:     CVP: CVP (mmHg): 10 mmHg (21 0700)   SVR:     PAP Systolic:     PAP Diastolic:     PVR:     WF42:     SCV02:      VAD Interrogation: LVAD (Heartmate)  Pump Speed (RPM): 9400  Pump Flow (LPM): 5.4  Chatter in Lines: No  PI (Pulsitility Index): 5.9  Power: 5.8  MAP: 92   Test: Yes  Back Up  at Bedside & Labeled: Yes  Power Module Test: Yes  Driveline Site Care: No  Driveline Dressing: Clean, Dry, and Intact    EKG/Rhythm:      Extubation Date / Time:     CT Output:     Ventilator:  Ventilator Volumes  Vt Set (ml): 450 ml (21 0750)  Vt Exhaled (Machine Breath) (ml): 473 ml (21 0750)  Vt Spont (ml): 520 ml (21 0235)  Ve Observed (l/min): 10.1 l/min (21 0235)    Oxygen Therapy:  Oxygen Therapy  O2 Sat (%): 94 % (21 1000)  Pulse via Oximetry: 101 beats per minute (21 0740)  O2 Device: Nasal cannula (21 0740)  Skin Assessment: Clean, dry, & intact (21 1600)  Skin Protection for O2 Device: No (21 1600)  O2 Flow Rate (L/min): 2 l/min (21 0740)  O2 Temperature:  (HME) (21 0328)  FIO2 (%): 21 % (212)  ETCO2 (mmHg): 36 mmHg (21 0200)    CXR:    Admission Weight: Last Weight   Weight: 269 lb 10 oz (122.3 kg) Weight: 223 lb 4.8 oz (101.3 kg)     Intake / Output / Drain:  Current Shift: No intake/output data recorded.   Last 24 hrs.:     Intake/Output Summary (Last 24 hours) at 2021 1307  Last data filed at 2021 0424  Gross per 24 hour   Intake 379.21 ml   Output    Net 379.21 ml             No results for input(s): CPK, CKMB, TROIQ in the last 72 hours.   Recent Labs     05/27/21  0356 05/26/21  0255 05/25/21  1152 05/25/21  0349    139 137 137   K 3.2* 3.9 3.4* 3.3*   CO2 22 27 27 25   BUN 23* 16 10 27*   CREA 4.11* 3.48* 2.27* 5.11*   GLU 89 134* 123* 121*   PHOS 3.7 3.3  --  4.8*   MG 2.5* 2.5* 2.2 2.4   WBC 3.6 2.8*  --  4.7   HGB 8.0* 8.3*  --  8.6*   HCT 27.0* 28.1*  --  28.7*   * 132*  --  130*     Recent Labs     05/27/21  0356 05/26/21  0255 05/25/21  0349   INR 2.0* 1.9* 1.5*   PTP 20.3* 18.9* 15.8*     No lab exists for component: PBNP        Current Facility-Administered Medications:     warfarin (COUMADIN) tablet 2.5 mg, 2.5 mg, Oral, ONCE, Ester Zhou MD  [facility] ON 5/28/2021] famotidine (PF) (PEPCID) 20 mg in 0.9% sodium chloride 10 mL injection, 20 mg, IntraVENous, Q24H, Migel Lynn DO    ceFAZolin (ANCEF) 1 g in 0.9% sodium chloride (MBP/ADV) 50 mL MBP, 1 g, IntraVENous, Q24H, Migel Lynn DO, Last Rate: 100 mL/hr at 05/26/21 1300, 1 g at 05/26/21 1300    PARoxetine (PAXIL) tablet 10 mg, 10 mg, Oral, DAILY, Triny Limon NP, 10 mg at 05/27/21 0916    dexmedeTOMidine in 0.9 % NaCl (PRECEDEX) 400 mcg/100 mL (4 mcg/mL) infusion soln, 0.1-1.5 mcg/kg/hr, IntraVENous, TITRATE, Migel Lynn DO, Last Rate: 5.3 mL/hr at 05/26/21 2241, 0.2 mcg/kg/hr at 05/26/21 2241    brivaracetam (BRIVIACT) injection 100 mg, 100 mg, IntraVENous, Q12H, Albert Akhtar DO, 100 mg at 05/27/21 0502    alteplase (CATHFLO) 2 mg in sterile water (preservative free) 2 mL injection, 2 mg, InterCATHeter, PRN, Dennis Cortez MD    isosorbide mononitrate ER (IMDUR) tablet 30 mg, 30 mg, Oral, BID, Ester Zhou MD, 30 mg at 05/27/21 0916    L.acidophilus-paracasei-S.thermophil-bifidobacter (RISAQUAD) 8 billion cell capsule, 1 Capsule, Oral, DAILY, Marry Ward MD, 1 Capsule at 05/27/21 0916    loperamide (IMODIUM) capsule 2 mg, 2 mg, Oral, Q4H PRN, Rosemarie Ward MD    albuterol-ipratropium (DUO-NEB) 2.5 MG-0.5 MG/3 ML, 3 mL, Nebulization, BID RT, Shane Del Cid MD, 3 mL at 05/27/21 0739    simethicone (MYLICON) tablet 80 mg, 80 mg, Oral, QID PRN, BRIJESH WardTVTREVER MCGREGOR MD    epoetin amalia-epbx (RETACRIT) injection 20,000 Units, 20,000 Units, SubCUTAneous, Q TUE, THU & SAT, Suznana Galarza MD, 20,000 Units at 05/25/21 2120    artificial saliva (MOUTH KOTE) 1 Spray, 1 Spray, Oral, PRN, Braxton Triny B, NP    [Held by provider] cephALEXin (KEFLEX) capsule 250 mg, 250 mg, Oral, BID, Braxton, Triny B, NP, 250 mg at 05/24/21 2120    hydrocortisone (CORTAID) 1 % cream, , Topical, PRN, AlmsallamFestus MD, Given at 05/24/21 1746    glucose chewable tablet 16 g, 4 Tablet, Oral, PRN, Elda Pineda MD    dextrose (D50W) injection syrg 12.5-25 g, 25-50 mL, IntraVENous, PRN, Elda Pineda MD    glucagon [facility]) injection 1 mg, 1 mg, IntraMUSCular, PRN, Elda Pineda MD    insulin lispro (HUMALOG) injection, , SubCUTAneous, AC&HS, Mariia Martinez MD, 2 Units at 05/24/21 0956    benzocaine-zinc cl-benzalkonium cl (ORAJEL) 20-0.1-0.02 % mucosal gel, , Oral, PRN, Shane Del Cid MD    magic mouthwash cpd (without sucralfate), 5 mL, Oral, DAILY PRN, Shane Del Cid MD, 5 mL at 05/27/21 0504    balsam peru-castor oiL (VENELEX) ointment, , Topical, BID, Shane Del Cid MD, Given at 05/27/21 9705    albumin human 25% (BUMINATE) solution 50 g, 50 g, IntraVENous, DIALYSIS PRN, Jaspreet Mendoza MD    heparin (porcine) 1,000 unit/mL injection 1,900 Units, 1,900 Units, InterCATHeter, DIALYSIS PRN, 1,900 Units at 05/25/21 1045 **AND** heparin (porcine) 1,000 unit/mL injection 1,900 Units, 1,900 Units, InterCATHeter, DIALYSIS PRN, Suzanna Galarza MD, 1,900 Units at 05/25/21 1044    hydrALAZINE (APRESOLINE) 20 mg/mL injection 20 mg, 20 mg, IntraVENous, Q6H PRN, Triny Limon, NP, 20 mg at 05/24/21 0010    hydrALAZINE (APRESOLINE) 20 mg/mL injection 10 mg, 10 mg, IntraVENous, Q6H PRN, Braxton, Triny B, NP, 10 mg at 05/25/21 0955    [Held by provider] hydrALAZINE (APRESOLINE) tablet 100 mg, 100 mg, Oral, TID, Braxton, Triny B, NP, 100 mg at 05/24/21 1748    tafamidis cap 61 mg (Patient Supplied), 61 mg, Oral, DAILY, Clemetine MD Lindsey, 61 mg at 05/27/21 1014    ondansetron (ZOFRAN) injection 4 mg, 4 mg, IntraVENous, Q4H PRN, Valeriy Morrow MD, 4 mg at 05/23/21 1216    zinc oxide-cod liver oil (DESITIN) 40 % paste, , Topical, Q12H, Shivani MCGREGOR MD, Given at 05/27/21 1013    sodium chloride (NS) flush 5-10 mL, 5-10 mL, IntraVENous, PRN, Bob Chavez MD    sodium chloride (NS) flush 5-40 mL, 5-40 mL, IntraVENous, Q8H, Gerald Paniagua MD, 10 mL at 05/26/21 2216    sodium chloride (NS) flush 5-40 mL, 5-40 mL, IntraVENous, PRN, Gerald Paniagua MD    acetaminophen (TYLENOL) tablet 650 mg, 650 mg, Oral, Q6H PRN **OR** acetaminophen (TYLENOL) suppository 650 mg, 650 mg, Rectal, Q6H PRN, Gerald Paniagua MD    Warfarin - Pharmacy to Dose, , Other, Rx Dosing/Monitoring, Eduardo Walter NP    sodium chloride (NS) flush 5-40 mL, 5-40 mL, IntraVENous, Q8H, Braxton, Triny B, NP, 10 mL at 05/27/21 0912    sodium chloride (NS) flush 5-40 mL, 5-40 mL, IntraVENous, PRN, Braxton, Triny B, NP, 10 mL at 05/23/21 1216    nystatin (MYCOSTATIN) 100,000 unit/gram powder, , Topical, PRN, Shivani Cuenca MD    A/P  S/P LVAD - good flows  Renal failure - HD  Urosepsis - Abx's  Need for anticoagulation - coumadin      Risk of morbidity and mortality - high  Medical decision making - high complexity    Signed By: Tejinder Solomon MD

## 2021-05-27 NOTE — PROGRESS NOTES
[facility]    ICU TEAM Progress Note    Name: Ulisses Johnson   : 1950   MRN: [id number]   Date: 2021           ICU Assessment     DNR/DNI per POA/Daughter Jade Bazan    Exploring options for Hospice (inpatient/outpatient/home)    1. Septic Shock (drive-line? Urine? Chest?)  2. Acute Metabolic Encephalopathy  a. Seizures?  b. Infection?  c. Hypoactive Delirium?  d. Hypoxia?  e. Multi-factoral?  3. Acute on Chronic Hypoxic Respiratory Failure  4. Atelectasis  5. Acute Kidney Injury/CKD  a. IHD per Nephrology (TTS; Marlene/Karen)  6. Morbid Obesity  7. S/p LVAD (4/15/17 at [facility])  8. Hx of Recurrent VT  9. Mild Pulm HTN  10. 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 C. albicans (in urine)  13. A fib  14. Chronic Anemia  15. ATTR Amyloidosis             ICU Comprehensive Plan of Care:     1. Neurological System  Avoid Benzos/Narcotics  Lights on during day  Briviact per Neuro  Spontaneous Awakening Trial: N/A  Sedation: None  Analgesia: Acetaminophen    2. Cardiovascular System  LVAD  CTS/AHF Following  MAP Goal of: > 65 mmHg  None - For above SBP/MAP goals  Transfusion Trigger (Hgb): <7 g/dL  Keep K > 4; Mg > 2     3. Respiratory System  Utilize triology as able  Spontaneous Breathing Trial: N/A  Pulmonary toilet: Duo-Nebs   SpO2 Goal: > 90%  DVT Prophylaxis: SCD's or Sequential Compression Device     4. Renal/GI/Endocrine System  Appreciate Nephrology  Ulcer Prophylaxis: Pepcid (famotidine)   Bowel Regimen: None needed at this time  Feeding:  Yes   Blood Sugar Goal 120-180 - Glycemic Control: Insulin    5. Infectious Disease  Appreciate ID Team  Indwelling Catheter:  Tubes: None  Lines: Central Line and Antonio  Drains: None  D - De-escalation of Antibiotics:   Ancef    6. PT/OT: PT consulted and on board, OT consulted and on board and Speech therapy consulted and on board     7. Goals of Care Discussion with family Yes     8.  Plan of Care/Code Status: DNR/DNR    9. Discussed Care Plan with Bedside RN    10. Documentation of Current Medications    Subjective:   Progress Note: 5/27/2021      Reason for ICU Admission: Acute Metabolic Encephalopathy & Respiratory Faliure     HPI:  Asked to see patient for possible ICU admission. Long, chronic history (see assessment above), prior LVAD, now dialysis dependent, worsening mental status, on IHD, multiple infections this hospitalizations as well as failure to thrive. RRT on 5/25 & 5/26, due to lethargy that is waxing & waning. ABG (with hypoxia), CT head (WNL) and dialysis completed -- patient still waxing & waning. Upon my assessment, she MIJARES's, arousable, one word answers to daughter at the bedside and appears to be protecting her airway.      Overnight Events:   None      Active Problem List:     Problem List  Date Reviewed: 4/25/2021        Codes Class    Severe sepsis with acute organ dysfunction ([facility] ICD-10-CM: A41.9, R65.20  ICD-9-CM: 038.9, 995.92 Acute        * (Principal) Acute encephalopathy ICD-10-CM: G93.40  ICD-9-CM: 348.30         Dyspnea ICD-10-CM: R06.00  ICD-9-CM: 786.09         HAIRSTON (dyspnea on exertion) ICD-10-CM: R06.00  ICD-9-CM: 786.09         Incontinence in female ICD-10-CM: R32  ICD-9-CM: 625.6         LVAD (left ventricular assist device) present [facility]) ICD-10-CM: Z95.811  ICD-9-CM: V43.21         Hospital discharge follow-up ICD-10-CM: Z09  ICD-9-CM: V67.59         ICD (implantable cardioverter-defibrillator) battery depletion ICD-10-CM: Z45.02  ICD-9-CM: V53.32         Coagulopathy ([facility] ICD-10-CM: D68.9  ICD-9-CM: 286.9         Open wound of left lower leg ICD-10-CM: S91.076T  ICD-9-CM: 891.0         Anemia ICD-10-CM: D64.9  ICD-9-CM: 285.9         ROCIO (acute kidney injury) ([facility] ICD-10-CM: N17.9  ICD-9-CM: 584.9         NSVT (nonsustained ventricular tachycardia) (HCC) ICD-10-CM: I47.2  ICD-9-CM: 427.1         NICM (nonischemic cardiomyopathy) ([facility] ICD-10-CM: I42.8  ICD-9-CM: 425.4         Coronary artery disease involving native coronary artery of native heart without angina pectoris ICD-10-CM: I25.10  ICD-9-CM: 414.01         JED on CPAP ICD-10-CM: G47.33, Z99.89  ICD-9-CM: 327.23, V46.8         Chronic venous insufficiency ICD-10-CM: I87.2  ICD-9-CM: 459.81         Ulcer of right foot, limited to breakdown of skin ([facility] ICD-10-CM: P36.482  ICD-9-CM: 707.15         Acute on chronic systolic CHF (congestive heart failure) (HCC) ICD-10-CM: I50.23  ICD-9-CM: 428.23, 428.0         Obesity, morbid (HCC) ICD-10-CM: E66.01  ICD-9-CM: 278.01               Past Medical History:      has a past medical history of Asthma, Cancer ([facility] Cancer ([facility] Congestive heart failure, unspecified, CRI (chronic renal insufficiency), Depression, Diabetes ([facility] Hypertension, and Severe sepsis with acute organ dysfunction ([facility] (5/11/2021). Past Surgical History:      has a past surgical history that includes hx mastectomy; hx hysterectomy; hx hernia repair; ir insert non tunl cvc over 5 yrs (4/26/2021); and ir insert tunl cvc w/o port over 5 yr (5/5/2021). Home Medications:     Prior to Admission medications    Medication Sig Start Date End Date Taking? Authorizing Provider   warfarin (COUMADIN) 3 mg tablet Take 3 mg by mouth every other day. 3 mg every other day alternating with 4 mg every other day   Yes Provider, Historical   warfarin (COUMADIN) 4 mg tablet Take 4 mg by mouth every other day. 3 mg every other day alternating with 4 mg every other day   Yes Provider, Historical   bumetanide (BUMEX) 2 mg tablet Take 1 Tab by mouth three (3) times daily for 30 days. 5/6/21 6/5/21  AlmsalYobani boyd MD   isosorbide mononitrate ER (IMDUR) 30 mg tablet Take 1 Tab by mouth daily for 30 days. 5/7/21 6/6/21  Nilson Navas MD   therapeutic multivitamin [facility]) tablet Take 1 Tab by mouth daily for 30 days.  5/7/21 6/6/21  Yobani Ribeiro MD   cephALEXin (KEFLEX) 250 mg capsule Take 1 Cap by mouth two (2) times a day for 30 days. 21  Almsallam, Danita Hong MD   FeroSul 325 mg (65 mg iron) tablet TAKE 1 TABLET BY MOUTH EVERY MORNING WITH BREAKFAST 21   Ines Diaz MD   ranolazine ER (RANEXA) 500 mg SR tablet TAKE 2 TABLETS BY MOUTH TWICE DAILY 3/16/21   Ines Diaz MD   hydrALAZINE (APRESOLINE) 25 mg tablet Take 4 Tabs by mouth three (3) times daily. 21   Nikkie Limon NP   insulin detemir (LEVEMIR U-100 INSULIN SC) 20 Units by SubCUTAneous route two (2) times a day. Provider, Historical   PARoxetine (PAXIL) 20 mg tablet Take 20 mg by mouth daily. Provider, Historical   albuterol (PROVENTIL HFA, VENTOLIN HFA, PROAIR HFA) 90 mcg/actuation inhaler Take 2 Puffs by inhalation every four (4) hours as needed for Wheezing. 20   Ines Diaz MD   tafamidis (Vyndamax) 61 mg cap Take 61 mg by mouth daily. 20   Donald Najera NP   magnesium oxide (MAG-OX) 400 mg tablet Take 1 Tab by mouth daily. 20   Donald Najera NP   budesonide-formoteroL (Symbicort) 160-4.5 mcg/actuation HFAA Take 2 Puffs by inhalation two (2) times daily as needed. Provider, Historical       Allergies/Social/Family History: Allergies   Allergen Reactions    Atarax [Hydroxyzine Hcl] Drowsiness    Benzodiazepines Other (comments)     Respiratory issues      Social History     Tobacco Use    Smoking status: Never Smoker    Smokeless tobacco: Never Used   Substance Use Topics    Alcohol use: Not Currently      No family history on file. Review of Systems:     A comprehensive review of systems was negative except for that written in the HPI.     Objective:   Vital Signs:  Visit Vitals  /82   Pulse 88   Temp 98.8 °F (37.1 °C)   Resp 27   Ht 5' 7\" (1.702 m)   Wt 101.3 kg (223 lb 4.8 oz)   SpO2 98%   BMI 34.97 kg/m²    O2 Flow Rate (L/min): 2 l/min O2 Device: Nasal cannula Temp (24hrs), Av.7 °F (37.1 °C), Min:97.4 °F (36.3 °C), Max:99.9 °F (37.7 °C)    CVP (mmHg): 10 mmHg (05/16/21 0700)      Intake/Output:     Intake/Output Summary (Last 24 hours) at 5/27/2021 0655  Last data filed at 5/27/2021 0424  Gross per 24 hour   Intake 509.94 ml   Output    Net 509.94 ml       Physical Exam:    General appearance: No distress, appears older than stated age, moderately obese  Eyes: negative  Nose: Nares normal. Septum midline. Mucosa normal. No drainage or sinus tenderness. Lungs: CTAB  Heart: paced   Abdomen: soft, non-tender. Bowel sounds normal. No masses,  no organomegaly, mild distention  Extremities: venous stasis dermatitis noted  Pulses: 2+ and symmetric  Skin: anterior shins with ecchymotic lesions of ventral surfaces bilaterally  Neurologic: MARYANA's    LABS AND  DATA: Personally reviewed  Recent Labs     05/27/21  0356 05/26/21  0255   WBC 3.6 2.8*   HGB 8.0* 8.3*   HCT 27.0* 28.1*   * 132*     Recent Labs     05/27/21  0356 05/26/21  0255    139   K 3.2* 3.9    104   CO2 22 27   BUN 23* 16   CREA 4.11* 3.48*   GLU 89 134*   CA 9.0 9.4   MG 2.5* 2.5*   PHOS 3.7 3.3     Recent Labs     05/27/21  0356 05/26/21  0255   AP 46 50   TP 6.8 7.1   ALB 3.0* 3.1*   GLOB 3.8 4.0     Recent Labs     05/27/21  0356 05/26/21  0255   INR 2.0* 1.9*   PTP 20.3* 18.9*      Recent Labs     05/25/21  1415 05/25/21  1144 05/25/21  0356   PHI 7.41 7.37 7.39   PCO2I 38.3 47.7* 40.2   PO2I 74* 49* 72*   FIO2I  --   --  21     No results for input(s): CPK, CKMB, TROIQ, BNPP in the last 72 hours. Hemodynamics:   PAP:   CO:     Wedge:   CI:     CVP:  CVP (mmHg): 10 mmHg (05/16/21 0700) SVR:       PVR:       Ventilator Settings:  Mode Rate Tidal Volume Pressure FiO2 PEEP   Spontaneous   450 ml  5 cm H2O 21 % 5 cm H20     Peak airway pressure: 25 cm H2O    Minute ventilation: 10.1 l/min        MEDS: Reviewed    Chest X-Ray:  CXR Results  (Last 48 hours)               05/25/21 1425  XR CHEST PORT Final result    Impression:  1.  LVAD, catheters and pacing wires are all similar to prior exam.   2. No definite congestion. No confluent airspace disease. Narrative:  INDICATION:  Respiratory failure. Altered neurologic status. Follow-up. COMPARISON:  Chest x-ray 5/11/21   FINDINGS:    A portable AP upright radiograph of the chest was obtained at 1412 hours. Right-sided pacemaker type device and lead wires again demonstrated and   unchanged. The right-sided tunneled dialysis catheter is also unchanged. Left-sided triple lumen central venous catheter is also unchanged and adequately   positioned. The LVAD device is seen at the bottom of the film and appears unchanged in   position. Cardiomegaly is stable. Minimal if any pulmonary vascular congestion. No confluent airspace disease or consolidation. No evidence of pleural fluid or pneumothorax. .                Multidisciplinary Rounds Completed: Yes    ABCDEF Bundle/Checklist Completed:  Yes    SPECIAL EQUIPMENT  LVAD    DISPOSITION  Transfer to non-ICU bed    CRITICAL CARE CONSULTANT NOTE  I had a face to face encounter with the patient, reviewed and interpreted patient data including clinical events, labs, images, vital signs, I/O's, and examined patient.   I have discussed the case and the plan and management of the patient's care with the consulting services, the bedside nurses and the respiratory therapist.      NOTE OF PERSONAL INVOLVEMENT IN CARE   V3    Charan Jones DO, MS  Staff Intensivist/Anesthesiologist  [facility]  5/27/2021

## 2021-05-27 NOTE — HOSPICE
Hospice Liaison Note:    Bedside meeting with patient, pt daughter, Opal Pompa, LCSALEXEY and Dr. Audrey Mills. Reviewed multpile plans involving hospice care. Patient states that she would like more than anything, to return home. Pt daughter, Thurmon Angelucci is in agreement to provide care for patient if she is able to return home with hospice. Dr. Audrey Mills has given verbal CTI for the hospice diagnosis of ESRD Primary. Secondary diagnosis is Heart Failure. Currently arranging plans for home with hospice. Hospice Home Admission scheduled for Friday at 15:00.      15:30: Home DME:Lift recliner, cane, rollator, wheelchair, BSC. Plan to order DME: Bed, OBT and oxygen for home delivery.     Thank you for your coordination with the hospice plan of care      Brittanie Weir RN, [place] [address] Nurse Liaison  [phone] [place]  [phone] Office

## 2021-05-27 NOTE — HOSPICE
Hospice Liaison Note:    Chart reviewed for updates in plan of care. Plan to meet with family this am.  Please call Hospice team to offer support for patient, family or staff.    09:30: Bedside visit with patient and her daughter, Kenney Castellano. Pt is alert, oriented X 2. Pleasantly confused, very chatty. Pt states she is feeling \"much better\" this morning. She shared that her goals are to return home and spend time with her family. Pt has sitter bedside as well. Per unit nurse, patient will no longer receive dialysis. Last dialysis was Tuesday. 10:00: Met with Kenney Nona and Sherrell Jaramillo LCSW to continue with goals of care and hospice services. Kenney Castellano is in agreement with hospice services. Reviewed plan for hospice care with Dr. Vernon Salts Director. Dr. Orellana Safe to meet with patient and daughter this afternoon prior to enrolling with hospice.     Thank you for your coordination with the hospice plan of care      Andrzej Santoyo RN, [place] [address] Nurse Liaison  [phone] [place]  [phone] Office

## 2021-05-27 NOTE — PROCEDURES
[address]  EEG    Name:  Ginger Rizvi  MR#:  [id number]  :  1950  ACCOUNT #:  [de-identified]  DATE OF SERVICE:  2021      REQUESTING PHYSICIAN:  Chelsea Gonzales DO    HISTORY:  The patient is a 65-year-old male with a history of previous stroke, LVAD, who is being evaluated for altered mental status. DESCRIPTION:  This is an 18-channel EEG performed on a poorly responsive patient. There is no clear dominant background rhythm. The background activity consists of medium voltage rhythms in the 6-7 Hz frequency range out of the posterior head region. Intermittently, there were sharply contoured slow waveforms seen in a generalized frontally predominant fashion with a triphasic configuration. Drowsiness and sleep architecture were not clearly seen. Photic stimulation did not elicit driving response. On rare occasion, there was generalized sharply contoured waveform seen followed by voltage suppression. EEG SUMMARY:  Abnormal EEG due to:  1. Occasional generalized sharp wave discharges. 2.  Generalized slowing of the background rhythms with slow waveforms of triphasic configuration. CLINICAL INTERPRETATION:  This EEG is suggestive of a mild-to-moderate generalized encephalopathic process, nonspecific in type. This may be related to an underlying structural brain injury and/or toxic/metabolic abnormality. In addition, there were rare generalized sharp wave discharges which may be related to an underlying history of epilepsy, or could be related to metabolic/inflammatory/neurodegenerative cause. No electrographic seizures were seen. Please correlate clinically.         Vee Coreas MD      [id number]  D:  2021 15:27  T:  2021 22:12  JOB #:  [id number]

## 2021-05-27 NOTE — ROUTINE PROCESS
Bedside and Verbal shift change report given to [address] [place] [address] (oncoming nurse) by Maria Elena Razo (offgoing nurse). Report included the following information SBAR, Kardex, [facility], Recent Results and Cardiac Rhythm Paced.

## 2021-05-27 NOTE — PROGRESS NOTES
Occupational Therapy: hold    Chart reviewed, consulted with RN, who advised to continue holding OT as patient has been confused/ agitated, would benefit from less stimulation, and has family meeting today with hospice consulted. Will continue to hold OT and will f/u as appropriate.     William Shipman, OTR/L

## 2021-05-27 NOTE — PROCEDURES
[address] [facility]  EEG    Name:  Mali Tuttle  MR#:  [id number]  :  1950  ACCOUNT #:  [de-identified]  DATE OF SERVICE:  2021      REQUESTING PHYSICIAN:  Dr. Nael Rahman    HISTORY:  The patient is a 47-year-old female who is being evaluated for suspected nonconvulsive-type seizures. DESCRIPTION:  This is an 18-channel EEG with video monitoring performed on 2021. The recording starts at 06:02 p.m. and continues until 08:19 a.m. on 2021. The dominant posterior background rhythm consists of medium voltage rhythms in the 6-7 Hz frequency range out of the posterior head region. Intermittently, there are frontally dominant sharply contoured slow waveforms of triphasic configuration lasting 3-4 seconds. In addition, there were occasional generalized spike wave, polyspike slow wave discharges seen lasting 3-4 seconds at a time and associated/followed by significant background voltage attenuation. Drowsiness and sleep architecture were not clearly seen. Photic stimulation and hyperventilation were not performed. Clinically, the patient was noted to be on BiPAP machine without any convulsive activities seen. EEG SUMMARY:  Abnormal EEG due to  1. Moderate slowing of the background rhythms with slow waveforms of triphasic configuration. 2.  Occasional generalized sharp and slow wave, polyspike slow wave discharges. CLINICAL INTERPRETATION:  This EEG is suggestive of moderate generalized encephalopathic process, nonspecific in type. This may be related to an underlying structural brain injury and/or more likely from a toxic/metabolic abnormality. In addition, there were occasional generalized epileptiform discharges seen which may be related to an underlying history of epilepsy or may be from a toxic/metabolic, inflammatory or neurodegenerative source. Please correlate clinically and with imaging.         Joe Johnson MD      [id number]  D:  2021 15:31  T:  05/26/2021 23:03  JOB #:  [id number]

## 2021-05-27 NOTE — PROGRESS NOTES
0800: Report received from [place], [facility]. Patient care assumed. Dr. Miguel Campos at bedside. Updated on patient status and the possibility of moving to hospice today. Pt. Will not receive dialysis today per Dr. Miguel Campos. 0900: Hospice nurse at bedside. Updated on patient status. Speaking with pt's daughter about plan moving forward. 1000: Pt. Becoming increasingly agitated. Dr. Ramón Bellamy at bedside. Orders for 25 mcg Fentanyl. 9419: Dr. Erasto Pereira and hospice RN at the bedside having a family meeting. Plan is to send pt. Home tomorrow on hospice if she remains stable throughout the night. 2000: Bedside and Verbal shift change report given to Marco A roberts RN (oncoming nurse) by Marianna Jeffery RN (offgoing nurse). Report included the following information SBAR, Kardex, Procedure Summary, Intake/Output, MAR, Recent Results, Cardiac Rhythm V paced and Alarm Parameters .

## 2021-05-27 NOTE — PROGRESS NOTES
[facility]  Hospitalist Group     ICU Transfer/Accept Summary     [place] [address] [place]  DATE OF TRANSFER: 5/27/2021       PATIENT ID: Brandan Lerner  MRN: [id number]   YOB: 1950    PRIMARY CARE PROVIDER: Anca Gee NP   DATE OF ADMISSION: 5/11/2021  1:26 AM    ATTENDING PHYSICIAN: Phuong Odonnell NP  CONSULTATIONS:   IP CONSULT TO ADVANCED HEART FAILURE  IP CONSULT TO ADVANCED HEART FAILURE  IP CONSULT TO INFECTIOUS DISEASES  IP CONSULT TO ELECTROPHYSIOLOGY  IP CONSULT TO NEUROLOGY  IP CONSULT TO INTENSIVIST  IP CONSULT TO PALLIATIVE CARE - PROVIDER    PROCEDURES/SURGERIES:   * No surgery found *    REASON FOR ADMISSION: Acute encephalopathy     HOSPITAL PROBLEM LIST:  Patient Active Problem List   Diagnosis Code    Obesity, morbid ([facility] E66.01    Acute on chronic systolic CHF (congestive heart failure) ([facility]) I50.23    NICM (nonischemic cardiomyopathy) ([facility]) I42.8    Coronary artery disease involving native coronary artery of native heart without angina pectoris I25.10    JED on CPAP G47.33, Z99.89    Chronic venous insufficiency I87.2    Ulcer of right foot, limited to breakdown of skin ([facility]) L97.521    NSVT (nonsustained ventricular tachycardia) ([facility]) I47.2    Anemia D64.9    ROCIO (acute kidney injury) ([facility] N17.9    Coagulopathy ([facility] D68.9    Open wound of left lower leg S81.802A    Incontinence in female R32    LVAD (left ventricular assist device) present [facility]) [address] discharge follow-up Z09    ICD (implantable cardioverter-defibrillator) battery depletion Z45.02    HAIRSTON (dyspnea on exertion) R06.00    Dyspnea R06.00    Acute encephalopathy G93.40    Severe sepsis with acute organ dysfunction ([facility]) A41.9, R65.20         Brief HPI and Hospital Course:    Per ICU note: Progress Note: 5/27/2021       Reason for ICU Admission: Acute Metabolic Encephalopathy & Respiratory Faliure   HPI:  Asked to see patient for possible ICU admission. Long, chronic history (see assessment above), prior LVAD, now dialysis dependent, worsening mental status, on IHD, multiple infections this hospitalizations as well as failure to thrive. RRT on 5/25 & 5/26, due to lethargy that is waxing & waning. ABG (with hypoxia), CT head (WNL) and dialysis completed -- patient still waxing & waning. Upon my assessment, she MIJARES's, arousable, one word answers to daughter at the bedside and appears to be protecting her airway. 05/27 1500: Patient seen this am. Plan was to transition to hospice services today, patient stable at this time. Discussed with hospice RN- Updated plan is for the patient to be discharged home with hospice tomorrow. Continue current treatment   Patient to transfer out of ICU   Assessment and Plan:    Acute metabolic encephalopathy   - Possible secondary to seizures, infection, hypoactive delirium, hypoxia, and/or multifactorial  -Neurology following  -Frequent reorientation      ROCIO on CKD:  - 2/2 progressive CKD and CRS  - dialysis initiated 4/26/21. Nephrology following: Discussions held with daughter that patient is no longer tolerating dialysis. Hospice consulted       Acute on chronic Hypoxic Resp failure :  - baseline severe COPD + chronic CHF + morbid obesity/ JED   - Pulm HTN   - extubated 5/13     Chronic systolic CHF, stage C NYHA class IV  Combined ischemic and nonischemic cardiomyopathy  S/p HM 2 LVAD implant 4/15/2017 by Dr. Nguyen Rajan at [facility]  hx of recurrent VT : off mexiletine  chronic RV failure. 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 Plan for patient to transition to home hospice tomorrow, will continue LVAD at home.    - Heart failure team following.        Chronic anemia :   ATTR amyloidosis  - anemia of chronic disease      Seizure:  - on Briviact  - neurology following      Chronic A. Fib.     History of endometrial Ca      PLAN: Patient to be discharged home tomorrow with hospice. PHYSICAL EXAMINATION:  Visit Vitals  /88   Pulse (!) 104   Temp 98.7 °F (37.1 °C)   Resp (!) 31   Ht 5' 7\" (1.702 m)   Wt 101.3 kg (223 lb 4.8 oz)   SpO2 94%   BMI 34.97 kg/m²       General:          Alert  HEENT:           Atraumatic, MMM            Neck:               Supple, symmetrical,  thyroid: non tender  Lungs:             No distress noted. Heart:              LVAD  Abdomen:       Soft, non-tender. Not distended. Bowel sounds normal  Extremities:     No cyanosis. No clubbing,  +2 distal pulses  Skin:                Not pale. Not Jaundiced  No rashes   Psych:             Not anxious or agitated.   Neurologic:      Alert, moves all extremities, oriented X 1.     CODE STATUS:   Full Code   X DNR    Partial    Comfort Care     Signed:   Pepito Sparrow NP  Date of Service:  5/27/2021  2:34 PM

## 2021-05-27 NOTE — PROGRESS NOTES
Neurology Progress Note  Yadira Webster NP    Admit Date: 2021   LOS: 15 days      Daily Progress Note: 2021    HPI: Ms. Raine Baca is a 78year old female here for advanced heart failure S/P LVAD on 4/15 with recurrent VT, septic shock, acute on chronic respiratory failure, and acute metabolic encephalopathy who Neurology was consulted for comment on patient's confusion and tremors. She had previously been seen by Neurology at a prior admission and diagnosed with multifocal metabolic myoclonus and it is suspected this is the same. She was started on briviact and placed on EEG without obvious epileptic discharges on preliminary read. On  daughter and providers met to discuss possibility of hospice care. Subjective:     Denies any acute concerns-reports she is wanting to have something to drink. Allergies   Allergen Reactions    Atarax [Hydroxyzine Hcl] Drowsiness    Benzodiazepines Other (comments)     Respiratory issues       Review of Systems:  A comprehensive review of systems was negative. Objective:   Vital signs  Temp (24hrs), Av.1 °F (36.7 °C), Min:97.2 °F (36.2 °C), Max:99.9 °F (37.7 °C)   No intake/output data recorded.  07 - 1900  In: 560 [I.V.:560]  Out: 832   Visit Vitals  BP (!) 147/70   Pulse 76   Temp 99.9 °F (37.7 °C)   Resp 24   Ht 5' 7\" (1.702 m)   Wt 102.9 kg (226 lb 14.4 oz)   SpO2 95%   BMI 35.54 kg/m²    O2 Flow Rate (L/min): 2 l/min O2 Device: Nasal cannula   Vitals:    21 1800 21 1900 21   BP:  (!) 147/70     Pulse: 72 73 76    Resp: 18  24    Temp:   99.9 °F (37.7 °C)    TempSrc:       SpO2: 98% 98% 95% 95%   Weight:       Height:            Physical Exam:  GENERAL: Calm, cooperative, NAD, on NC at exam  SKIN: Warm, dry, color appropriate for ethnicity. Neurologic Exam:  Mental Status:  Alert and oriented x 3-not fully understanding/comprehending situation, but does state she think she maybe had a seizure. Appropriate affect, mood and behavior. Language:    Normal fluency, comprehension    Cranial Nerves:   Pupils 3 mm, equal, round and reactive to light. Visual fields full to confrontation. Extraocular movements intact. Full facial strength, no asymmetry. Hearing grossly intact bilaterally. No dysarthria. Tongue protrudes to midline, palate elevates symmetrically. Shoulder shrug 5/5 bilaterally. Motor:    No pronator drift. Bulk and tone normal.      5/5 power in all extremities proximally and distally. No involuntary movements. Sensation:    Sensation intact throughout to light touch x4    Labs:  Lab Results   Component Value Date/Time    WBC 2.8 (L) 05/26/2021 02:55 AM    HGB 8.3 (L) 05/26/2021 02:55 AM    HCT 28.1 (L) 05/26/2021 02:55 AM    PLATELET 024 (L) 01/99/1498 02:55 AM    MCV 94.3 05/26/2021 02:55 AM     Lab Results   Component Value Date/Time    Sodium 139 05/26/2021 02:55 AM    Potassium 3.9 05/26/2021 02:55 AM    Chloride 104 05/26/2021 02:55 AM    CO2 27 05/26/2021 02:55 AM    Anion gap 8 05/26/2021 02:55 AM    Glucose 134 (H) 05/26/2021 02:55 AM    BUN 16 05/26/2021 02:55 AM    Creatinine 3.48 (H) 05/26/2021 02:55 AM    BUN/Creatinine ratio 5 (L) 05/26/2021 02:55 AM    GFR est AA 16 (L) 05/26/2021 02:55 AM    GFR est non-AA 13 (L) 05/26/2021 02:55 AM    Calcium 9.4 05/26/2021 02:55 AM     Imaging:  CT Results (maximum last 3): Results from [place] encounter on 05/11/21    CT HEAD WO CONT    Narrative  EXAM:  CT HEAD WO CONT    INDICATION: Acute confusion. COMPARISON: CT 5/11/2021    FINDINGS:  There is diffuse age-related parenchymal volume loss. The ventricles  and sulci are age-appropriate without hydrocephalus. There is no mass effect or  midline shift. There is no intracranial hemorrhage or extra-axial fluid  collection. A probable subcentimeter right frontal meningioma is again noted.   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 There is no new abnormal parenchymal attenuation. The basal cisterns are patent. The osseous structures are intact. The visualized paranasal sinuses and mastoid  air cells are clear. Impression  No acute intracranial abnormality. CT HEAD WO CONT    Narrative  INDICATION: AMS    EXAM:  HEAD CT WITHOUT CONTRAST    COMPARISON: November 11, 2020    FINDINGS:    Ventricles: Midline, no hydrocephalus. Intracranial Hemorrhage: None. Brain Parenchyma/Brainstem: Unchanged chronic infarctions right cerebellum and  right parietotemporal junction. Basal Cisterns: Normal.  Paranasal Sinuses: Visualized sinuses are clear. Additional Comments: 7 mm partly calcified extra-axial focus right frontal  convexity likely a small meningioma. Impression  No acute process. Results from [place] encounter on 11/09/20    CT HEAD WO CONT    Narrative  EXAM: CT HEAD WO CONT    INDICATION: AMS, involuntary muscular movement    COMPARISON: None. CONTRAST: None. FINDINGS:  Ventricles and sulci are within normal limits. No hemorrhage or acute infarction  is identified. Is 1.1 cm high density lesion based against the posterior frontal  bone most likely meningioma. The bone windows demonstrate no abnormalities. The visualized portions of the  paranasal sinuses and mastoid air cells are clear. Impression  IMPRESSION:  No acute abnormality identified. Incidentally noted is a 1.1 cm meningioma.     Assessment:   Principal Problem:    Acute encephalopathy (5/11/2021)    Active Problems:    Severe sepsis with acute organ dysfunction ([facility] [address] (5/11/2021)      Plan: 1) Metabolic Encephalopathy with Myoclonic Tremors vs. Seizures                   -Will F/U EEG reading-suspicious discharges noted on preliminary reading, final reading pending                  -Continue Briviact 100mg BID                  -Continue to correct metabolic factors that can be contributing per primary team     Plan discussed with the patient and the primary RN. The patient verbalized understanding of the current plan of care and is in agreement. Nahid Small NP  Neurocritical Care Nurse Practitioner    ==========================================================    Neurology staff:    I examined the patient the bedside. I agree with the plans and documentation above from TEA [place]. As of yesterday Suzette Ballesteros is showing improvement neurologically. She is talkative with me this morning. EEG did not show seizures but did show some rare discharges. She is on Briviact. On exam she is awake and alert following commands. She is oriented to location month date and year. She does admit to some memory loss of events but otherwise is better. 71-year-old woman who has been critically ill whom neurologically is stabilizing. No more seizures. I think she needs to stay on Briviact from here on out given the tendency for seizures to develop. We will follow peripherally for now. Please call if needed.   [facility], DO  Neurologist  Diplomate, American Board of Psychiatry and Neurology  Board Certified, Adult Neurology and Brain Injury Medicine

## 2021-05-27 NOTE — PROGRESS NOTES
Bedside shift change report given to Cherylene Gouge (oncoming nurse) by Randal Maya RN (offgoing nurse). Report included the following information SBAR, Kardex, ED Summary, Intake/Output, MAR, Recent Results and Cardiac Rhythm Paced. 2200 LVAD dressing changed under sterile technique. Exit site appears normal, no drainage noted. 2230 Bedside shift change report given to Kelly Briseno RN (oncoming nurse) by Steven De La O RN (offgoing nurse). Report included the following information SBAR, Kardex, ED Summary, Intake/Output, MAR, Recent Results and Cardiac Rhythm Paced.

## 2021-05-27 NOTE — PROGRESS NOTES
[facility] in [place], [place]  Inpatient Consult Progress Note      Patient name: Reji Ojeda  Patient : 1950  Patient MRN: [id number]  Consulting MD: Virgilio Vance MD    Date of service: 21    REASON FOR CONSULT:  Candida sepsis     PLAN:  Made DNR/DNI; patient needs ICD deactivated  IP consult for hospice -ongoing discussions  Holding HD  Will need aggressive symptom management and consider stopping LVAD when symptoms begin for volume overload       IMPRESSION:  Likely urosepsis  Acute respiratory failure   Chronic RV failure  Coronary artery disease  · [facility] (2016) high grade ramus and small PDA disease, borderline disease of LAD and takeoff of pRCA  Chronic systolic heart failure  · Stage C, NYHA class IV improved to IIIA symptoms with LVAD  · Combined ischemic and non-ischemic cardiomyopathy, 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 Ruthy Hugo)  · C/b delayed extubation due to severe COPD  · C/b critical illness polyneuropathy  · C/b prolonged hospitalization post-LVAD, 55 days  · C/b sternal wound infection s/p debridement (by Dr. Sandro Foster) s/p wound vac  · C/b sacral ulcer  · Would culture positive for Staph aureus, not MRSA  · C/b LVAD site drainage, improved  S/p CRT-ICD  · ICD fired due to electrolyte imbalance ()  H/o breast cancer ()   · s/p bilateral mastectomy/chemo and endometrial cancer s/p hysterectomy  · Lymphedema of LLE due to cancer treatment  Severe COPD with FEV1 50%  Depression  Atrial fibrillation  H/o \"two mini strokes\"  S/p fall with hip facture   · Right hip hemmiarthroplasty (18) by Dr. Reyes Rose)  · S/p removed hip hardwarare due to pain (4/15/19)  COPD severe  CKD, stage 4- on HD  Hyperkalemia, resolved  Pulmonary hypertension  Cardiac risk factors:  · Morbid obesity, Body mass index is 42.29 kg/m². · DM2 insulin dependent  · JED on Trilogy  · HL  Urinary incontinence, severe  · no procedures due to anticoagulation  Endometrial cancer (2002)  HTN  HL  DNR/DNI     Recent Events:  More alert but confused  Made DNR/DNI  Hospice consulted      CARDIAC IMAGING:  TTE 5/11/21 LVEF < 15%, LVIDd 5.96cm, TAPSE 0.99cm, RVIDd 4.14cm  TTE 4/19 shows improved LVIDd, 6.68 cm with RVIDd 5.14 cm and TAPSE 1.1 cm   TTE 4/28/21 LVIDd 7.37cm, RVIDd 5.27cm, TAPSE 1.44cm   Echo (9/24/19) LVEF 20-25%, AV opens 1:1, no AR  Echo (5/29/18) LVEF 10-%, ramps study done, report in [facility]  Echo (1/9/18) ramp study done, LVEDD 7.1cm  LHC (11/9/18) 2 vessel disease with 90% OM, 80% PDA, DSA to PDA branch  RHC 4/20 showed adequate Sal CI 3.0 but severely elevated RA pressure 24 mmHg  CXR negative 4/27/21     INTERVAL HISTORY:  Ms. Chiqui Cortes is [place].[age] y.o.  Female s/p LVAD implant with HM 2 and ESRD requiring HD that was recently admitted for RV failure and worsening renal function. She was discharged on 5/5/21 to [facility] for rehabilitation before transitioning home to live her with daughter. In the last few days she had worsening mental status changed, decreased UOP and respiratory distress after possible aspiration- she was intubated and admitted to [facility] for further management. It appears patient condition was triggered by candida UTI and likely intolerance of BIPAP - inadequately supporting ventilation in rehab - patient is dependent on Trilogy support.      LVAD INTERROGATION:  Device interrogated in person  Device function normal, normal flow, no events  LVAD   Pump Speed (RPM): 9400  Pump Flow (LPM): 5.4  MAP: 95  PI (Pulsitility Index): 5.9  Power: 5.8   Test: Yes  Back Up  at Bedside & Labeled: Yes  Power Module Test: Yes  Driveline Site Care: No  Driveline Dressing: Clean, Dry, and Intact  Outpatient: No  MAP in Therapeutic Range (Outpatient): Yes  Testing  Alarms Reviewed: Yes  Back up SC speed: 9400  Back up Low Speed Limit: 900  Emergency Equipment with Patient?: Yes  Emergency procedures reviewed?: Yes  Drive line site inspected?: No  Drive line intergrity inspected?: Yes  Drive line dressing changed?: No    PHYSICAL EXAM:  Visit Vitals  /88   Pulse (!) 104   Temp 98.7 °F (37.1 °C)   Resp 18   Ht 5' 7\" (1.702 m)   Wt 223 lb 4.8 oz (101.3 kg)   SpO2 94%   BMI 34.97 kg/m²     General: Patient is well developed, well-nourished in no acute distress  HEENT: Normocephalic and atraumatic. No scleral icterus. Pupils are equal, round and reactive to light and accomodation. No conjunctival injection. Oropharynx is clear. Neck: Supple. No evidence of thyroid enlargements or lymphadenopathy. JVD: Cannot be appreciated   Lungs: Breath sounds are equal and clear bilaterally. No wheezes, rhonchi, or rales. Heart: Regular rate and rhythm with normal S1 and S2. No murmurs, gallops or rubs. Abdomen: Soft, no mass or tenderness. No organomegaly or hernia. Bowel sounds present. Genitourinary and rectal: deferred  Extremities: No cyanosis, clubbing, or edema. Neurologic: Unable to determine  Psychiatric: awake but confused and agitated   Skin: Warm, dry and well perfused. No lesions, nodules or rashes are noted. REVIEW OF SYSTEMS:  General: Unable to obtain due to confusion    PAST MEDICAL HISTORY:  Past Medical History:   Diagnosis Date    Asthma     Cancer ([facility] [address]     breast    Cancer ([facility] [address]     endometrial    Congestive heart failure, unspecified     CRI (chronic renal insufficiency)     Depression     Diabetes ([address]     Hypertension     Severe sepsis with acute organ dysfunction ([address] 5/11/2021       PAST SURGICAL HISTORY:  Past Surgical History:   Procedure Laterality Date    HX HERNIA REPAIR      HX HYSTERECTOMY      HX MASTECTOMY      IR INSERT NON TUNL CVC OVER 5 YRS  4/26/2021    IR INSERT TUNL CVC W/O PORT OVER 5 YR  5/5/2021       FAMILY HISTORY:  No family history on file.     SOCIAL HISTORY:  Social History     Socioeconomic History    Marital status:      Spouse name: Not on file    Number of children: Not on file    Years of education: Not on file    Highest education level: Not on file   Tobacco Use    Smoking status: Never Smoker    Smokeless tobacco: Never Used   Substance and Sexual Activity    Alcohol use: Not Currently     Social Determinants of Health     Financial Resource Strain:     Difficulty of Paying Living Expenses:    Food Insecurity:     Worried About Running Out of Food in the Last Year:     [address] in the Last Year:    Transportation Needs:     Lack of Transportation (Medical):  Lack of Transportation (Non-Medical):    Physical Activity:     Days of Exercise per Week:     Minutes of Exercise per Session:    Stress:     Feeling of Stress :    Social Connections:     Frequency of Communication with Friends and Family:     Frequency of Social Gatherings with Friends and Family:     Attends [employer] Services:     Active Member of Clubs or Organizations:     Attends Club or Organization Meetings:     Marital Status:        LABORATORY RESULTS:     Labs Latest Ref Rng & Units 5/27/2021 5/26/2021 5/25/2021 5/25/2021 5/24/2021 5/23/2021 5/22/2021   WBC 3.6 - 11.0 K/uL 3.6 2.8(L) - 4.7 4.6 4.2 4.2   RBC 3.80 - 5.20 M/uL 2.87(L) 2.98(L) - 3.12(L) 3.22(L) 3.26(L) 2.97(L)   Hemoglobin 11.5 - 16.0 g/dL 8. 0(L) 8. 3(L) - 8. 6(L) 8.7(L) 8. 9(L) 8. 1(L)   Hematocrit 35.0 - 47.0 % 27. 0(L) 28. 1(L) - 28. 7(L) 29. 7(L) 29. 8(L) 27. 7(L)   MCV 80.0 - 99.0 FL 94.1 94.3 - 92.0 92.2 91.4 93.3   Platelets 102 - 635 K/uL 138(L) 132(L) - 130(L) 129(L) 139(L) 133(L)   Lymphocytes 12 - 49 % 17 22 - 11(L) 11(L) 10(L) 11(L)   Monocytes 5 - 13 % 11 11 - 9 9 8 8   Eosinophils 0 - 7 % 0 0 - 0 0 0 0   Basophils 0 - 1 % 1 1 - 1 1 1 1   Bands 0 - 6 % - - - - - - -   Albumin 3.5 - 5.0 g/dL 3. 0(L) 3. 1(L) 3. 3(L) 3. 2(L) 3.0(L) 3.0(L) 2. 9(L)   Calcium 8.5 - 10.1 MG/DL 9.0 9.4 8.7 9.2 8.9 8.8 9.0   Glucose 65 - 100 mg/dL 89 134(H) 123(H) 121(H) 119(H) 112(H) 125(H)   BUN 6 - 20 MG/DL 23(H) 16 10 27(H) 22(H) 14 28(H)   Creatinine 0.55 - 1.02 MG/DL 4.11(H) 3.48(H) 2.27(H) 5.11(H) 4.15(H) 3.14(H) 4.97(H)   Sodium 136 - 145 mmol/L 139 139 137 137 134(L) 136 135(L)   Potassium 3.5 - 5.1 mmol/L 3.2(L) 3.9 3.4(L) 3. 3(L) 3.6 3.8 3.9   TSH 0.36 - 3.74 uIU/mL - - - - - - -   LDH 81 - 246 U/L 174 195 - 197 194 210 192   Some recent data might be hidden     Lab Results   Component Value Date/Time    TSH 1.60 05/15/2021 03:14 AM    TSH 5.36 (H) 04/24/2021 04:32 AM    TSH 3.99 (H) 04/17/2021 04:54 AM    TSH 2.980 10/01/2020 12:00 AM       ALLERGY:  Allergies   Allergen Reactions    Atarax [Hydroxyzine Hcl] Drowsiness    Benzodiazepines Other (comments)     Respiratory issues        CURRENT MEDICATIONS:    Current Facility-Administered Medications:     warfarin (COUMADIN) tablet 2.5 mg, 2.5 mg, Oral, ONCE, Yarelis Cornelius MD  [address] [place] [address]  [START ON 5/28/2021] famotidine (PF) (PEPCID) 20 mg in 0.9% sodium chloride 10 mL injection, 20 mg, IntraVENous, Q24H, Migel Lynn DO    ceFAZolin (ANCEF) 1 g in 0.9% sodium chloride (MBP/ADV) 50 mL MBP, 1 g, IntraVENous, Q24H, Migel Lynn DO, Last Rate: 100 mL/hr at 05/26/21 1300, 1 g at 05/26/21 1300    PARoxetine (PAXIL) tablet 10 mg, 10 mg, Oral, DAILY, Triny Limon NP, 10 mg at 05/27/21 0916    dexmedeTOMidine in 0.9 % NaCl (PRECEDEX) 400 mcg/100 mL (4 mcg/mL) infusion soln, 0.1-1.5 mcg/kg/hr, IntraVENous, TITRATE, Migel Lynn DO, Last Rate: 5.3 mL/hr at 05/26/21 2241, 0.2 mcg/kg/hr at 05/26/21 2241    brivaracetam (BRIVIACT) injection 100 mg, 100 mg, IntraVENous, Q12H, Albert Akhtar, , 100 mg at 05/27/21 0502    alteplase (CATHFLO) 2 mg in sterile water (preservative free) 2 mL injection, 2 mg, InterCATHeter, PRN, Tea Crook MD    isosorbide mononitrate ER (IMDUR) tablet 30 mg, 30 mg, Oral, BID, Yarelis Cornelius MD, 30 mg at 05/27/21 0916    L.acidophilus-paracasei-S.thermophil-bifidobacter (RISAQUAD) 8 billion cell capsule, 1 Capsule, Oral, DAILY, Ed SVMLQYK MD MECHE, 1 Capsule at 05/27/21 0916    loperamide (IMODIUM) capsule 2 mg, 2 mg, Oral, Q4H PRN, JIE Ward MD    albuterol-ipratropium (DUO-NEB) 2.5 MG-0.5 MG/3 ML, 3 mL, Nebulization, BID RT, Beth Aj MD, 3 mL at 05/27/21 0739    simethicone (MYLICON) tablet 80 mg, 80 mg, Oral, QID PRN, Ed XHJGQYB MD MECHE    epoetin amalia-epbx (RETACRIT) injection 20,000 Units, 20,000 Units, SubCUTAneous, Q TUE, THU & SAT, Teddy Milian MD, 20,000 Units at 05/25/21 2120    artificial saliva (MOUTH KOTE) 1 Spray, 1 Spray, Oral, PRN, Braxton, Triny B, NP    [Held by provider] cephALEXin (KEFLEX) capsule 250 mg, 250 mg, Oral, BID, Braxton, Triny B, NP, 250 mg at 05/24/21 2120    hydrocortisone (CORTAID) 1 % cream, , Topical, PRN, AlmsallamFestus MD, Given at 05/24/21 1746    glucose chewable tablet 16 g, 4 Tablet, Oral, PRN, Elda Pineda MD    dextrose (D50W) injection syrg 12.5-25 g, 25-50 mL, IntraVENous, PRN, Elda Pineda MD    glucagon [facility]) injection 1 mg, 1 mg, IntraMUSCular, PRN, Elda Pineda MD    insulin lispro (HUMALOG) injection, , SubCUTAneous, AC&HS, Benito Kwan MD, 2 Units at 05/24/21 0956    benzocaine-zinc cl-benzalkonium cl (ORAJEL) 20-0.1-0.02 % mucosal gel, , Oral, PRN, Beth Aj MD    magic mouthwash cpd (without sucralfate), 5 mL, Oral, DAILY PRN, Beth Aj MD, 5 mL at 05/27/21 0504    balsam peru-castor oiL (VENELEX) ointment, , Topical, BID, Beth Aj MD, Given at 05/27/21 0917    albumin human 25% (BUMINATE) solution 50 g, 50 g, IntraVENous, DIALYSIS PRN, Jaspreet Mendoza MD    heparin (porcine) 1,000 unit/mL injection 1,900 Units, 1,900 Units, InterCATHeter, DIALYSIS PRN, 1,900 Units at 05/25/21 1045 **AND** heparin (porcine) 1,000 unit/mL injection 1,900 Units, 1,900 Units, InterCATHeter, DIALYSIS PRN, Lynda Nicole MD, 1,900 Units at 05/25/21 1044    hydrALAZINE (APRESOLINE) 20 mg/mL injection 20 mg, 20 mg, IntraVENous, Q6H PRN, Braxton, Triny B, NP, 20 mg at 05/24/21 0010    hydrALAZINE (APRESOLINE) 20 mg/mL injection 10 mg, 10 mg, IntraVENous, Q6H PRN, Braxton, Triny B, NP, 10 mg at 05/25/21 0955    [Held by provider] hydrALAZINE (APRESOLINE) tablet 100 mg, 100 mg, Oral, TID, Braxton, Triny B, NP, 100 mg at 05/24/21 1748    tafamidis cap 61 mg (Patient Supplied), 61 mg, Oral, DAILY, Danyelle Richey MD, 61 mg at 05/27/21 1014    ondansetron (ZOFRAN) injection 4 mg, 4 mg, IntraVENous, Q4H PRN, Valeriy Morrow MD, 4 mg at 05/23/21 1216    zinc oxide-cod liver oil (DESITIN) 40 % paste, , Topical, Q12H, Harshad MCGREGOR MD, Given at 05/27/21 1013    sodium chloride (NS) flush 5-10 mL, 5-10 mL, IntraVENous, PRN, Radha Antunez MD    sodium chloride (NS) flush 5-40 mL, 5-40 mL, IntraVENous, Q8H, Candice Hernandez MD, 10 mL at 05/26/21 2216    sodium chloride (NS) flush 5-40 mL, 5-40 mL, IntraVENous, PRN, Candice Hernandez MD    acetaminophen (TYLENOL) tablet 650 mg, 650 mg, Oral, Q6H PRN **OR** acetaminophen (TYLENOL) suppository 650 mg, 650 mg, Rectal, Q6H PRN, Candice Hernandez MD    Warfarin - Pharmacy to Dose, , Other, Rx Dosing/Monitoring, Wallace Headings, NP    sodium chloride (NS) flush 5-40 mL, 5-40 mL, IntraVENous, Q8H, Braxton, Triny B, NP, 10 mL at 05/27/21 0912    sodium chloride (NS) flush 5-40 mL, 5-40 mL, IntraVENous, PRN, Triny Limon, NP, 10 mL at 05/23/21 1216    nystatin (MYCOSTATIN) 100,000 unit/gram powder, , Topical, PRN, Harshad Joseph MD    PATIENT CARE TEAM:  Patient Care Team:  Bakari Roy NP as PCP - General (Nurse Practitioner)  Abel Prescott MD (Cardiology)  Anisa Silvestre MD as Physician (Cardiology)  Judy Solis LPN as Care Coordinator  Andrew Landry MD (Cardiothoracic Surgery)  Danyelle Richey MD (Cardiology)     Thank you for allowing me to participate in this patient's care. Laquita Wolf, TEA  [facility] [address], [address]  Phone: [phone]      F ATTENDING ADDENDUM    Patient was seen and examined in person. Data and notes were reviewed. I have discussed and agree with the plan as noted in the NP note above without further additions.     Alexis Cordero MD PhD  [facility] [address]

## 2021-05-27 NOTE — PROGRESS NOTES
Patient seen this am.   Discussed with hospice RN, patient to be admitted into hospice services today. Plan is for patient to transfer from ICU to [facility] hospice. Please let us know if you need any assistance from hospitalist services.

## 2021-05-27 NOTE — PROGRESS NOTES
Pharmacist Note  Warfarin Dosing  Consult provided for this 78 y. o.female to manage warfarin for LVAD    INR Goal: 1.8-2.5 (2/2 GIB)  Home regimen/ tablet size: 4mg every other day alternating with 3 mg QOD    Drugs that may increase INR: None  Drugs that may decrease INR: None  Other current anticoagulants/ drugs that may increase bleeding risk: none  Risk factors: Age > 65, dialysis  Daily INR ordered: YES    Recent Labs     05/27/21  0356 05/26/21  0255 05/25/21  0349   HGB 8.0* 8.3* 8.6*   INR 2.0* 1.9* 1.5*     Date               INR                  Dose  5/11  3.3  hold   5/12                1.9                    2 mg      5/13                 1.4                   4 mg     5/14                 1.4                   6 mg      5/15  1.9  4 mg  5/16  3.1  HOLD  5/17                 4.1                  HOLD     5/18                 4.2                  HOLD   5/19                 3.9                  HOLD   5/20  3.2  HOLD  5/21  2.9  HOLD  5/22  1.5  2.5 mg              5/23  1.3  4 mg  5/24  1.3   4 mg  5/25  1.5   4 mg  5/26  1.9  2.5 mg   5/27                 2.0                  2.5 mg                                                                  Assessment/ Plan: Will order warfarin 2.5 mg x 1 today     Pharmacy will continue to monitor daily and adjust therapy as indicated. Please contact the pharmacist at  for outpatient recommendations if needed. Pharmacist Note  Warfarin Dosing  Consult provided for this 78 y. o.female to manage warfarin for LVAD    INR Goal: 1.8-2.5 (2/2 GIB)  Home regimen/ tablet size: 4mg every other day alternating with 3 mg QOD    Drugs that may increase INR: None  Drugs that may decrease INR: None  Other current anticoagulants/ drugs that may increase bleeding risk: none  Risk factors: Age > 65, dialysis  Daily INR ordered: YES    Recent Labs     05/26/21  0255 05/25/21  0349 05/24/21  0017   HGB 8.3* 8.6* 8.7*   INR 1.9* 1.5* 1.3*     Date               INR Dose  5/11  3.3  hold   5/12                1.9                    2 mg      5/13                 1.4                   4 mg     5/14                 1.4                   6 mg      5/15  1.9  4 mg  5/16  3.1  HOLD  5/17                 4.1                  HOLD     5/18                 4.2                  HOLD   5/19                 3.9                  HOLD   5/20  3.2  HOLD  5/21  2.9  HOLD  5/22  1.5  2.5 mg              5/23  1.3  4 mg  5/24  1.3   4 mg  5/25  1.5   4 mg  5/26  1.9  2.5 mg                                                                   Assessment/ Plan: Will order warfarin 2.5 mg x 1 today     Pharmacy will continue to monitor daily and adjust therapy as indicated. Please contact the pharmacist at  for outpatient recommendations if needed.

## 2021-05-27 NOTE — PROGRESS NOTES
[facility]   [address] [facility], [address] [place] [address] [address], [facility]  Phone: [phone]   [phone]       Nephrology Progress Note  Reji Ojeda     1950     [id number]  Date of Admission : 5/11/2021 05/27/21    CC: Follow up for ROCIO    Assessment and Plan   ROCIO on CKD:  - 2/2 progressive CKD and CRS  - dialysis initiated 4/26/21. Has RIJ permacath for access. - after discussion with daughter yesterday evening, she is moving toward comfort care  - no plans for dialysis today  - DNR now  - no official plan for hospice yet, but this is the most appropriate as she is no longer tolerating dialysis    Acute on chronic Hypoxic Resp failure :  - baseline severe COPD + chronic CHF + morbid obesity/ JED   - Pulm HTN   - extubated 5/13     Chronic systolic CHF, stage C NYHA class IV  Combined ischemic and nonischemic cardiomyopathy  S/p HM 2 LVAD implant 4/15/2017 by Dr. Sandro Foster at [facility]  hx of recurrent VT : off mexiletine  chronic RV failure. 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 - per AHF team      Chronic anemia :   ATTR amyloidosis  - anemia of chronic disease   - continue HEIKE  MWF     Seizure:  - on Briviact  - neurology following    Chronic A. Fib. History of endometrial Ca       Interval History:  Seen and examined. Intermittent confusion. Pulling at lines this AM.  Plans for possible transition to comfort care. Pt in DNR now. Review of Systems: Pertinent items are noted in HPI.     Current Medications:   Current Facility-Administered Medications   Medication Dose Route Frequency    warfarin (COUMADIN) tablet 2.5 mg  2.5 mg Oral ONCE    famotidine (PF) (PEPCID) 20 mg in 0.9% sodium chloride 10 mL injection  20 mg IntraVENous Q12H    ceFAZolin (ANCEF) 1 g in 0.9% sodium chloride (MBP/ADV) 50 mL MBP  1 g IntraVENous Q24H    PARoxetine (PAXIL) tablet 10 mg  10 mg Oral DAILY    dexmedeTOMidine in 0.9 % NaCl (PRECEDEX) 400 mcg/100 mL (4 mcg/mL) infusion soln  0.1-1.5 mcg/kg/hr IntraVENous TITRATE    brivaracetam (BRIVIACT) injection 100 mg  100 mg IntraVENous Q12H    alteplase (CATHFLO) 2 mg in sterile water (preservative free) 2 mL injection  2 mg InterCATHeter PRN    isosorbide mononitrate ER (IMDUR) tablet 30 mg  30 mg Oral BID    L.acidophilus-paracasei-S.thermophil-bifidobacter (RISAQUAD) 8 billion cell capsule  1 Capsule Oral DAILY    loperamide (IMODIUM) capsule 2 mg  2 mg Oral Q4H PRN    albuterol-ipratropium (DUO-NEB) 2.5 MG-0.5 MG/3 ML  3 mL Nebulization BID RT    simethicone (MYLICON) tablet 80 mg  80 mg Oral QID PRN    epoetin amalia-epbx (RETACRIT) injection 20,000 Units  20,000 Units SubCUTAneous Q TUE, THU & SAT    artificial saliva (MOUTH KOTE) 1 Spray  1 Spray Oral PRN    [Held by provider] cephALEXin (KEFLEX) capsule 250 mg  250 mg Oral BID    hydrocortisone (CORTAID) 1 % cream   Topical PRN    glucose chewable tablet 16 g  4 Tablet Oral PRN    dextrose (D50W) injection syrg 12.5-25 g  25-50 mL IntraVENous PRN    glucagon (GLUCAGEN) injection 1 mg  1 mg IntraMUSCular PRN    insulin lispro (HUMALOG) injection   SubCUTAneous AC&HS    benzocaine-zinc cl-benzalkonium cl (ORAJEL) 20-0.1-0.02 % mucosal gel   Oral PRN    magic mouthwash cpd (without sucralfate)  5 mL Oral DAILY PRN    balsam peru-castor oiL (VENELEX) ointment   Topical BID    albumin human 25% (BUMINATE) solution 50 g  50 g IntraVENous DIALYSIS PRN    heparin (porcine) 1,000 unit/mL injection 1,900 Units  1,900 Units InterCATHeter DIALYSIS PRN    And    heparin (porcine) 1,000 unit/mL injection 1,900 Units  1,900 Units InterCATHeter DIALYSIS PRN    hydrALAZINE (APRESOLINE) 20 mg/mL injection 20 mg  20 mg IntraVENous Q6H PRN    hydrALAZINE (APRESOLINE) 20 mg/mL injection 10 mg  10 mg IntraVENous Q6H PRN    [Held by provider] hydrALAZINE (APRESOLINE) tablet 100 mg  100 mg Oral TID    tafamidis cap 61 mg (Patient Supplied)  61 mg Oral DAILY    ondansetron (ZOFRAN) injection 4 mg  4 mg IntraVENous Q4H PRN    zinc oxide-cod liver oil (DESITIN) 40 % paste   Topical Q12H    sodium chloride (NS) flush 5-10 mL  5-10 mL IntraVENous PRN    sodium chloride (NS) flush 5-40 mL  5-40 mL IntraVENous Q8H    sodium chloride (NS) flush 5-40 mL  5-40 mL IntraVENous PRN    acetaminophen (TYLENOL) tablet 650 mg  650 mg Oral Q6H PRN    Or    acetaminophen (TYLENOL) suppository 650 mg  650 mg Rectal Q6H PRN    Warfarin - Pharmacy to Dose   Other Rx Dosing/Monitoring    sodium chloride (NS) flush 5-40 mL  5-40 mL IntraVENous Q8H    sodium chloride (NS) flush 5-40 mL  5-40 mL IntraVENous PRN    nystatin (MYCOSTATIN) 100,000 unit/gram powder   Topical PRN      Allergies   Allergen Reactions    Atarax [Hydroxyzine Hcl] Drowsiness    Benzodiazepines Other (comments)     Respiratory issues       Objective:  Vitals:    Vitals:    05/27/21 0600 05/27/21 0700 05/27/21 0740 05/27/21 0800   BP: 117/82 (!) 107/98     Pulse: 88 92  (!) 103   Resp: 27 22 22   Temp:    98.7 °F (37.1 °C)   TempSrc:       SpO2: 98% 95% 100% 95%   Weight:       Height:         Intake and Output:  No intake/output data recorded. 05/25 1901 - 05/27 0700  In: 790.9 [P.O.:240; I.V.:550.9]  Out: 0     Physical Examination:    General: confused  HEENT            EOMI  Neck:  permacath +  Resp:  Lungs clear B/L, no wheezing   CV:  RRR,  S1,S2  GI:  Soft, NT, + BS  Neurologic:  confused  Psych:             Unable to assess this AM      []    High complexity decision making was performed  []    Patient is at high-risk of decompensation with multiple organ involvement    Lab Data Personally Reviewed: I have reviewed all the pertinent labs, microbiology data and radiology studies during assessment.     Recent Labs     05/27/21  0356 05/26/21  0255 05/25/21  1152 05/25/21  0349    139 137 137   K 3.2* 3.9 3.4* 3.3*    104 102 100   CO2 22 27 27 25   GLU 89 134* 123* 121*   BUN 23* 16 10 27*   CREA 4.11* 3.48* 2.27* 5.11*   CA 9.0 9.4 8.7 9.2   MG 2.5* 2.5* 2.2 2.4   PHOS 3.7 3.3  --  4.8*   ALB 3.0* 3.1* 3.3* 3.2*   ALT 7* 8* 7* 8*   INR 2.0* 1.9*  --  1.5*     Recent Labs     05/27/21  0356 05/26/21  0255 05/25/21  0349   WBC 3.6 2.8* 4.7   HGB 8.0* 8.3* 8.6*   HCT 27.0* 28.1* 28.7*   * 132* 130*     No results found for: SDES  Lab Results   Component Value Date/Time    Culture result: CANDIDA ALBICANS (A) 05/13/2021 11:29 AM    Culture result: RARE DIPHTHEROIDS (A) 05/12/2021 04:29 AM    Culture result: LIGHT YEAST, (APPARENT CANDIDA ALBICANS) (A) 05/11/2021 07:09 PM    Culture result: LIGHT NORMAL RESPIRATORY BETO 05/11/2021 07:09 PM    Culture result: NO GROWTH 5 DAYS 05/11/2021 04:30 PM     Recent Results (from the past 24 hour(s))   GLUCOSE, POC    Collection Time: 05/26/21  9:28 AM   Result Value Ref Range    Glucose (POC) 115 65 - 117 mg/dL    Performed by Luisana MAZARIEGOS    GLUCOSE, POC    Collection Time: 05/26/21 12:42 PM   Result Value Ref Range    Glucose (POC) 119 (H) 65 - 117 mg/dL    Performed by Luisana MAZARIEGOS    EKG, 12 LEAD, INITIAL    Collection Time: 05/26/21  1:37 PM   Result Value Ref Range    Ventricular Rate 71 BPM    Atrial Rate 71 BPM    P-R Interval 232 ms    QRS Duration 162 ms    Q-T Interval 506 ms    QTC Calculation (Bezet) 549 ms    Calculated R Axis -71 degrees    Calculated T Axis 99 degrees    Diagnosis       Sinus rhythm with 1st degree AV block  Left axis deviation  Nonspecific intraventricular block  Inferior infarct , age undetermined  Anterolateral infarct , age undetermined  When compared with ECG of 14-MAY-2021 13:37,  Sinus rhythm has replaced Wide QRS rhythm     GLUCOSE, POC    Collection Time: 05/26/21  5:20 PM   Result Value Ref Range    Glucose (POC) 92 65 - 117 mg/dL    Performed by ROBER Rider    Collection Time: 05/26/21 10:12 PM   Result Value Ref Range    Glucose (POC) 83 65 - 117 mg/dL    Performed by [facility] YOHANNES SALAZAR    Collection Time: 05/27/21  3:56 AM   Result Value Ref Range     81 - 246 U/L   MAGNESIUM    Collection Time: 05/27/21  3:56 AM   Result Value Ref Range    Magnesium 2.5 (H) 1.6 - 2.4 mg/dL   NT-PRO BNP    Collection Time: 05/27/21  3:56 AM   Result Value Ref Range    NT pro-BNP 17,985 (H) <125 PG/ML   PHOSPHORUS    Collection Time: 05/27/21  3:56 AM   Result Value Ref Range    Phosphorus 3.7 2.6 - 4.7 MG/DL   PROCALCITONIN    Collection Time: 05/27/21  3:56 AM   Result Value Ref Range    Procalcitonin 0.25 ng/mL   PROTHROMBIN TIME + INR    Collection Time: 05/27/21  3:56 AM   Result Value Ref Range    INR 2.0 (H) 0.9 - 1.1      Prothrombin time 20.3 (H) 9.0 - 11.1 sec   CBC WITH AUTOMATED DIFF    Collection Time: 05/27/21  3:56 AM   Result Value Ref Range    WBC 3.6 3.6 - 11.0 K/uL    RBC 2.87 (L) 3.80 - 5.20 M/uL    HGB 8.0 (L) 11.5 - 16.0 g/dL    HCT 27.0 (L) 35.0 - 47.0 %    MCV 94.1 80.0 - 99.0 FL    MCH 27.9 26.0 - 34.0 PG    MCHC 29.6 (L) 30.0 - 36.5 g/dL    RDW 19.7 (H) 11.5 - 14.5 %    PLATELET 440 (L) 778 - 400 K/uL    MPV 9.1 8.9 - 12.9 FL    NRBC 0.0 0  WBC    ABSOLUTE NRBC 0.00 0.00 - 0.01 K/uL    NEUTROPHILS 70 32 - 75 %    LYMPHOCYTES 17 12 - 49 %    MONOCYTES 11 5 - 13 %    EOSINOPHILS 0 0 - 7 %    BASOPHILS 1 0 - 1 %    IMMATURE GRANULOCYTES 1 (H) 0.0 - 0.5 %    ABS. NEUTROPHILS 2.6 1.8 - 8.0 K/UL    ABS. LYMPHOCYTES 0.6 (L) 0.8 - 3.5 K/UL    ABS. MONOCYTES 0.4 0.0 - 1.0 K/UL    ABS. EOSINOPHILS 0.0 0.0 - 0.4 K/UL    ABS. BASOPHILS 0.0 0.0 - 0.1 K/UL    ABS. IMM.  GRANS. 0.0 0.00 - 0.04 K/UL    DF SMEAR SCANNED      RBC COMMENTS ANISOCYTOSIS  1+        RBC COMMENTS POLYCHROMASIA  1+        RBC COMMENTS HYPOCHROMIA  1+        RBC COMMENTS SCHISTOCYTES  PRESENT       METABOLIC PANEL, COMPREHENSIVE    Collection Time: 05/27/21  3:56 AM   Result Value Ref Range    Sodium 139 136 - 145 mmol/L    Potassium 3.2 (L) 3.5 - 5.1 mmol/L    Chloride 105 97 - 108 mmol/L    CO2 22 21 - 32 mmol/L    Anion gap 12 5 - 15 mmol/L    Glucose 89 65 - 100 mg/dL    BUN 23 (H) 6 - 20 MG/DL    Creatinine 4.11 (H) 0.55 - 1.02 MG/DL    BUN/Creatinine ratio 6 (L) 12 - 20      GFR est AA 13 (L) >60 ml/min/1.73m2    GFR est non-AA 11 (L) >60 ml/min/1.73m2    Calcium 9.0 8.5 - 10.1 MG/DL    Bilirubin, total 0.6 0.2 - 1.0 MG/DL    ALT (SGPT) 7 (L) 12 - 78 U/L    AST (SGOT) 16 15 - 37 U/L    Alk. phosphatase 46 45 - 117 U/L    Protein, total 6.8 6.4 - 8.2 g/dL    Albumin 3.0 (L) 3.5 - 5.0 g/dL    Globulin 3.8 2.0 - 4.0 g/dL    A-G Ratio 0.8 (L) 1.1 - 2.2             Total time spent with patient:  xxx   min. Care Plan discussed with:  Patient     Family      RN      Consulting Physician [facility],         I have reviewed the flowsheets. Chart and Pertinent Notes have been reviewed. No change in PMH ,family and social history from Consult note.       Nori Andrea MD

## 2021-05-27 NOTE — PROGRESS NOTES
Renal Dosing/Monitoring  Medication: famotidine   Current regimen:  20 mg every 12 hr  Recent Labs     05/27/21  0356 05/26/21  0255 05/25/21  1152   CREA 4.11* 3.48* 2.27*   BUN 23* 16 10     Estimated CrCl:  dialysis ml/min  Plan: Change to q24h given renal function

## 2021-05-28 NOTE — PROGRESS NOTES
[facility] in [place], [place]  Inpatient Consult Progress Note      Patient name: Teddy Jernigan  Patient : 1950  Patient MRN: [id number]  Consulting MD: Dante Borrero MD    Date of service: 21    REASON FOR CONSULT:  Candida sepsis     PLAN:  Made DNR/DNI; patient needs ICD deactivated  Plan to send home today on hospice   Will need aggressive symptom management and consider stopping LVAD when symptoms begin for volume overload       IMPRESSION:  Likely urosepsis  Acute respiratory failure   Chronic RV failure  Coronary artery disease  · [facility] (2016) high grade ramus and small PDA disease, borderline disease of LAD and takeoff of pRCA  Chronic systolic heart failure  · Stage C, NYHA class IV improved to IIIA symptoms with LVAD  · Combined ischemic and non-ischemic cardiomyopathy, 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 Santiago Archer)  · C/b delayed extubation due to severe COPD  · C/b critical illness polyneuropathy  · C/b prolonged hospitalization post-LVAD, 55 days  · C/b sternal wound infection s/p debridement (by Dr. Calhoun Stands) s/p wound vac  · C/b sacral ulcer  · Would culture positive for Staph aureus, not MRSA  · C/b LVAD site drainage, improved  S/p CRT-ICD  · ICD fired due to electrolyte imbalance ()  H/o breast cancer ()   · s/p bilateral mastectomy/chemo and endometrial cancer s/p hysterectomy  · Lymphedema of LLE due to cancer treatment  Severe COPD with FEV1 50%  Depression  Atrial fibrillation  H/o \"two mini strokes\"  S/p fall with hip facture   · Right hip hemmiarthroplasty (18) by Dr. Elsa Vogel)  · S/p removed hip hardwarare due to pain (4/15/19)  COPD severe  CKD, stage 4- on HD  Hyperkalemia, resolved  Pulmonary hypertension  Cardiac risk factors:  · Morbid obesity, Body mass index is 42.29 kg/m².  · DM2 insulin dependent  · JED on Trilogy  · HL  Urinary incontinence, severe  · no procedures due to anticoagulation  Endometrial cancer (2002)  HTN  HL  DNR/DNI     Recent Events:  No acute overnight events  Hospice plans noted      CARDIAC IMAGING:  TTE 5/11/21 LVEF < 15%, LVIDd 5.96cm, TAPSE 0.99cm, RVIDd 4.14cm  TTE 4/19 shows improved LVIDd, 6.68 cm with RVIDd 5.14 cm and TAPSE 1.1 cm   TTE 4/28/21 LVIDd 7.37cm, RVIDd 5.27cm, TAPSE 1.44cm   Echo (9/24/19) LVEF 20-25%, AV opens 1:1, no AR  Echo (5/29/18) LVEF 10-%, ramps study done, report in [facility]  Echo (1/9/18) ramp study done, LVEDD 7.1cm  LHC (11/9/18) 2 vessel disease with 90% OM, 80% PDA, DSA to PDA branch  RHC 4/20 showed adequate Sal CI 3.0 but severely elevated RA pressure 24 mmHg  CXR negative 4/27/21     INTERVAL HISTORY:  Ms. Araceli Luther is [age] y.o.  Female s/p LVAD implant with HM 2 and ESRD requiring HD that was recently admitted for RV failure and worsening renal function. She was discharged on 5/5/21 to [facility] for rehabilitation before transitioning home to live her with daughter. In the last few days she had worsening mental status changed, decreased UOP and respiratory distress after possible aspiration- she was intubated and admitted to [facility] for further management. It appears patient condition was triggered by candida UTI and likely intolerance of BIPAP - inadequately supporting ventilation in rehab - patient is dependent on Trilogy support.      LVAD INTERROGATION:  Device interrogated in person  Device function normal, normal flow, no events  LVAD   Pump Speed (RPM): 9400  Pump Flow (LPM): 5.1  MAP: 100  PI (Pulsitility Index): 6.6  Power: 5.7   Test: No  Back Up  at Bedside & Labeled: Yes  Power Module Test: No  Driveline Site Care: No  Driveline Dressing: Clean, Dry, and Intact  Outpatient: No  MAP in Therapeutic Range (Outpatient): Yes  Testing  Alarms Reviewed: Yes  Back up SC speed: 9400  Back up Low Speed Limit: 900  Emergency Equipment with Patient?: Yes  Emergency procedures reviewed?: Yes  Drive line site inspected?: No  Drive line intergrity inspected?: Yes  Drive line dressing changed?: No    PHYSICAL EXAM:  Visit Vitals  /88   Pulse 97   Temp 98.1 °F (36.7 °C)   Resp 22   Ht 5' 7\" (1.702 m)   Wt 223 lb 4.8 oz (101.3 kg)   SpO2 95%   BMI 34.97 kg/m²     General: Patient is well developed, well-nourished in no acute distress  HEENT: Normocephalic and atraumatic. No scleral icterus. Pupils are equal, round and reactive to light and accomodation. No conjunctival injection. Oropharynx is clear. Neck: Supple. No evidence of thyroid enlargements or lymphadenopathy. JVD: Cannot be appreciated   Lungs: Breath sounds are equal and clear bilaterally. No wheezes, rhonchi, or rales. Heart: Regular rate and rhythm with normal S1 and S2. No murmurs, gallops or rubs. Abdomen: Soft, no mass or tenderness. No organomegaly or hernia. Bowel sounds present. Genitourinary and rectal: deferred  Extremities: No cyanosis, clubbing, or edema. Neurologic: confused but interactive   Skin: Warm, dry and well perfused. No lesions, nodules or rashes are noted. REVIEW OF SYSTEMS:  General: Unable to obtain due to confusion    PAST MEDICAL HISTORY:  Past Medical History:   Diagnosis Date    Asthma     Cancer ([address]     breast    Cancer [address] [address]     endometrial    Congestive heart failure, unspecified     CRI (chronic renal insufficiency)     Depression     Diabetes ([address]     Hypertension     Severe sepsis with acute organ dysfunction ([address] 5/11/2021       PAST SURGICAL HISTORY:  Past Surgical History:   Procedure Laterality Date    HX HERNIA REPAIR      HX HYSTERECTOMY      HX MASTECTOMY      IR INSERT NON TUNL CVC OVER 5 YRS  4/26/2021    IR INSERT TUNL CVC W/O PORT OVER 5 YR  5/5/2021       FAMILY HISTORY:  No family history on file.     SOCIAL HISTORY:  Social History     Socioeconomic History    Marital status:      Spouse name: Not on file    Number of children: Not on file    Years of education: Not on file    Highest education level: Not on file   Tobacco Use    Smoking status: Never Smoker    Smokeless tobacco: Never Used   Substance and Sexual Activity    Alcohol use: Not Currently     Social Determinants of Health     Financial Resource Strain:     Difficulty of Paying Living Expenses:    Food Insecurity:     Worried About Running Out of Food in the Last Year:     [address] in the Last Year:    Transportation Needs:     Lack of Transportation (Medical):  Lack of Transportation (Non-Medical):    Physical Activity:     Days of Exercise per Week:     Minutes of Exercise per Session:    Stress:     Feeling of Stress :    Social Connections:     Frequency of Communication with Friends and Family:     Frequency of Social Gatherings with Friends and Family:     Attends [employer] Services:     Active Member of Clubs or Organizations:     Attends Club or Organization Meetings:     Marital Status:        LABORATORY RESULTS:     Labs Latest Ref Rng & Units 5/27/2021 5/26/2021 5/25/2021 5/25/2021 5/24/2021 5/23/2021 5/22/2021   WBC 3.6 - 11.0 K/uL 3.6 2.8(L) - 4.7 4.6 4.2 4.2   RBC 3.80 - 5.20 M/uL 2.87(L) 2.98(L) - 3.12(L) 3.22(L) 3.26(L) 2.97(L)   Hemoglobin 11.5 - 16.0 g/dL 8. 0(L) 8. 3(L) - 8. 6(L) 8.7(L) 8. 9(L) 8. 1(L)   Hematocrit 35.0 - 47.0 % 27. 0(L) 28. 1(L) - 28. 7(L) 29. 7(L) 29. 8(L) 27. 7(L)   MCV 80.0 - 99.0 FL 94.1 94.3 - 92.0 92.2 91.4 93.3   Platelets 790 - 645 K/uL 138(L) 132(L) - 130(L) 129(L) 139(L) 133(L)   Lymphocytes 12 - 49 % 17 22 - 11(L) 11(L) 10(L) 11(L)   Monocytes 5 - 13 % 11 11 - 9 9 8 8   Eosinophils 0 - 7 % 0 0 - 0 0 0 0   Basophils 0 - 1 % 1 1 - 1 1 1 1   Bands 0 - 6 % - - - - - - -   Albumin 3.5 - 5.0 g/dL 3. 0(L) 3. 1(L) 3. 3(L) 3. 2(L) 3.0(L) 3.0(L) 2. 9(L)   Calcium 8.5 - 10.1 MG/DL 9.0 9.4 8.7 9.2 8.9 8.8 9.0   Glucose 65 - 100 mg/dL 89 134(H) 123(H) 121(H) 119(H) 112(H) 125(H)   BUN 6 - 20 MG/DL 23(H) 16 10 27(H) 22(H) 14 28(H)   Creatinine 0.55 - 1.02 MG/DL 4.11(H) 3.48(H) 2.27(H) 5.11(H) 4.15(H) 3.14(H) 4.97(H)   Sodium 136 - 145 mmol/L 139 139 137 137 134(L) 136 135(L)   Potassium 3.5 - 5.1 mmol/L 3.2(L) 3.9 3.4(L) 3. 3(L) 3.6 3.8 3.9   TSH 0.36 - 3.74 uIU/mL - - - - - - -   LDH 81 - 246 U/L 174 195 - 197 194 210 192   Some recent data might be hidden     Lab Results   Component Value Date/Time    TSH 1.60 05/15/2021 03:14 AM    TSH 5.36 (H) 04/24/2021 04:32 AM    TSH 3.99 (H) 04/17/2021 04:54 AM    TSH 2.980 10/01/2020 12:00 AM       ALLERGY:  Allergies   Allergen Reactions    Atarax [Hydroxyzine Hcl] Drowsiness    Benzodiazepines Other (comments)     Respiratory issues        CURRENT MEDICATIONS:    Current Facility-Administered Medications:     famotidine (PF) (PEPCID) 20 mg in 0.9% sodium chloride 10 mL injection, 20 mg, IntraVENous, Q24H, Migel Lynn DO, 20 mg at 05/28/21 0901    ceFAZolin (ANCEF) 1 g in 0.9% sodium chloride (MBP/ADV) 50 mL MBP, 1 g, IntraVENous, Q24H, Migel Lynn DO, Last Rate: 100 mL/hr at 05/27/21 1439, 1 g at 05/27/21 1439    PARoxetine (PAXIL) tablet 10 mg, 10 mg, Oral, DAILY, Triny Limon NP, 10 mg at 05/28/21 0901    dexmedeTOMidine in 0.9 % NaCl (PRECEDEX) 400 mcg/100 mL (4 mcg/mL) infusion soln, 0.1-1.5 mcg/kg/hr, IntraVENous, TITRATE, Ratzlaff, Migel A, DO, Last Rate: 8 mL/hr at 05/27/21 2047, 0.3 mcg/kg/hr at 05/27/21 2047    brivaracetam (BRIVIACT) injection 100 mg, 100 mg, IntraVENous, Q12H, Albert Akhtar, DO, 100 mg at 05/27/21 1938    alteplase (CATHFLO) 2 mg in sterile water (preservative free) 2 mL injection, 2 mg, InterCATHeter, PRN, Sujatha Rodriguez MD    isosorbide mononitrate ER (IMDUR) tablet 30 mg, 30 mg, Oral, BID, Duane Castaneda MD, 30 mg at 05/28/21 0901    L.acidophilus-paracasei-S.thermophil-bifidobacter (RISAQUAD) 8 billion cell capsule, 1 Capsule, Oral, DAILY, Idalia Jolley MD, 1 Capsule at 05/28/21 0901    loperamide (IMODIUM) capsule 2 mg, 2 mg, Oral, Q4H PRN, Rosemarie Ward MD    albuterol-ipratropium (DUO-NEB) 2.5 MG-0.5 MG/3 ML, 3 mL, Nebulization, BID RT, Rauline MD Lindsey, 3 mL at 05/28/21 0736    simethicone (MYLICON) tablet 80 mg, 80 mg, Oral, QID PRN, Ed COOQCALEXEYF MD MECHE    epoetin amalia-epbx (RETACRIT) injection 20,000 Units, 20,000 Units, SubCUTAneous, Q TUE, THU & SAT, Pavithra Cerda MD, 20,000 Units at 05/25/21 2120    artificial saliva (MOUTH KOTE) 1 Spray, 1 Spray, Oral, PRN, Braxton Triny B, NP    [Held by provider] cephALEXin (KEFLEX) capsule 250 mg, 250 mg, Oral, BID, Braxton Triny B, NP, 250 mg at 05/24/21 2120    hydrocortisone (CORTAID) 1 % cream, , Topical, PRN, AlmsallamFestus MD, Given at 05/24/21 1746    glucose chewable tablet 16 g, 4 Tablet, Oral, PRN, Elda Pineda MD    dextrose (D50W) injection syrg 12.5-25 g, 25-50 mL, IntraVENous, PRN, Elda Pineda MD    glucagon [facility]") injection 1 mg, 1 mg, IntraMUSCular, PRN, Virgilio Vance MD    insulin lispro (HUMALOG) injection, , SubCUTAneous, AC&HS, Virgiliomae Vance MD, 2 Units at 05/27/21 1724    benzocaine-zinc cl-benzalkonium cl (ORAJEL) 20-0.1-0.02 % mucosal gel, , Oral, PRN, Radha Portillo MD    magic mouthwash cpd (without sucralfate), 5 mL, Oral, DAILY PRN, Charlesmetkulwant Portillo MD, 5 mL at 05/27/21 0504    balsam peru-castor oiL (VENELEX) ointment, , Topical, BID, Clemetine MD Lindsey, Given at 05/28/21 0855    albumin human 25% (BUMINATE) solution 50 g, 50 g, IntraVENous, DIALYSIS PRN, Pavithra Cerda MD    heparin (porcine) 1,000 unit/mL injection 1,900 Units, 1,900 Units, InterCATHeter, DIALYSIS PRN, 1,900 Units at 05/25/21 1045 **AND** heparin (porcine) 1,000 unit/mL injection 1,900 Units, 1,900 Units, InterCATHeter, DIALYSIS PRN, Pavithra Cerda MD, 1,900 Units at 05/25/21 1044    hydrALAZINE (APRESOLINE) 20 mg/mL injection 20 mg, 20 mg, IntraVENous, Q6H PRN, Braxton, Triny B, NP, 20 mg at 05/24/21 0010    hydrALAZINE (APRESOLINE) 20 mg/mL injection 10 mg, 10 mg, IntraVENous, Q6H PRN, Braxton, Triny B, NP, 10 mg at 05/27/21 2023    [Held by provider] hydrALAZINE (APRESOLINE) tablet 100 mg, 100 mg, Oral, TID, Braxton, Triny B, NP, 100 mg at 05/24/21 1748    tafamidis cap 61 mg (Patient Supplied), 61 mg, Oral, DAILY, Clemetine MD Lindsey, 61 mg at 05/28/21 0901    ondansetron (ZOFRAN) injection 4 mg, 4 mg, IntraVENous, Q4H PRN, Valeriy Morrow MD, 4 mg at 05/23/21 1216    zinc oxide-cod liver oil (DESITIN) 40 % paste, , Topical, Q12H, Shivani MCGREGOR MD, Given at 05/28/21 0857    sodium chloride (NS) flush 5-10 mL, 5-10 mL, IntraVENous, PRN, Bob Chavez MD    sodium chloride (NS) flush 5-40 mL, 5-40 mL, IntraVENous, Q8H, Gerald Paniagua MD, 10 mL at 05/27/21 1434    sodium chloride (NS) flush 5-40 mL, 5-40 mL, IntraVENous, PRN, Gerald Paniagua MD    acetaminophen (TYLENOL) tablet 650 mg, 650 mg, Oral, Q6H PRN **OR** acetaminophen (TYLENOL) suppository 650 mg, 650 mg, Rectal, Q6H PRN, Gerald Paniagua MD    Warfarin - Pharmacy to Dose, , Other, Rx Dosing/Monitoring, Stacia Warren NP    sodium chloride (NS) flush 5-40 mL, 5-40 mL, IntraVENous, Q8H, Braxton, Triny B, NP, 10 mL at 05/27/21 1434    sodium chloride (NS) flush 5-40 mL, 5-40 mL, IntraVENous, PRN, Braxton, Triny B, NP, 10 mL at 05/23/21 1216    nystatin (MYCOSTATIN) 100,000 unit/gram powder, , Topical, PRN, Shivani Cuenca MD    PATIENT CARE TEAM:  Patient Care Team:  Shasha Buchanan NP as PCP - General (Nurse Practitioner)  Arnie Dahl MD (Cardiology)  Wadell Siemens, MD as Physician (Cardiology)  Lamar Roberts LPN as Care Coordinator  Haroldo Armendariz MD (Cardiothoracic Surgery)  Radha Portillo MD (Cardiology)     Thank you for allowing me to participate in this patient's care.     Ravin Sanders NP  Advanced Heart Failure [address]  [address] [facility], [address]  Phone: [phone]

## 2021-05-28 NOTE — DISCHARGE SUMMARY
Inpatient hospitalist discharge summary                Brief Overview    PATIENT ID: Reji Ojeda    MRN: [id number]     YOB: 1950    Admitting Provider: Surinder Michelle MD    Discharging Provider: Hilario Yanez MD   To contact this individual call [phone] and ask the  to page. If unavailable ask to be transferred the Adult Hospitalist Department. PCP at discharge: Shasha Buchanan [phone]   [address] [place] [address] 20 / [address] [facility] [address]    Admission date: 5/11/2021  Date of Discharge: 05/28/21    Chief complaint:   Chief Complaint   Patient presents with    Altered mental status     Patient Active Problem List   Diagnosis Code    Obesity, morbid ([facility] E66.01    Acute on chronic systolic CHF (congestive heart failure) ([facility] I50.23    NICM (nonischemic cardiomyopathy) ([facility] I42.8    Coronary artery disease involving native coronary artery of native heart without angina pectoris I25.10    JED on CPAP G47.33, Z99.89    Chronic venous insufficiency I87.2    Ulcer of right foot, limited to breakdown of skin ([facility]) L97.521    NSVT (nonsustained ventricular tachycardia) ([facility]) I47.2    Anemia D64.9    ROCIO (acute kidney injury) ([facility] N17.9    Coagulopathy ([facility] D68.9    Open wound of left lower leg S81.802A    Incontinence in female R32    LVAD (left ventricular assist device) present [facility]) [address] discharge follow-up Z09    ICD (implantable cardioverter-defibrillator) battery depletion Z45.02    HAIRSTON (dyspnea on exertion) R06.00    Dyspnea R06.00    Acute encephalopathy G93.40    Severe sepsis with acute organ dysfunction ([facility]) A41.9, R65.20         Discharge diagnosis, hospital course/plan:  Patient and family opted for hospice. I was informed that patient is planned for discharge aftenoon. D/w neurology continued briviact 100 mg bid. AHFT did the remaining medicine reconciliation. Talked to patient and family today.  Agreed with discharge planning. Patient is going home with hospice    On the date of discharge, diagnostic face to face encounter was performed. Patient was hemodynamically stable, offering no new complaints. Denies any shortness of breath at rest, no fevers or chills, no diarrhea or constipation. Patient is agreeable for discharge. Patient understood and verbalized the understanding of the discharge plan. Patient was advised to seek medical help/ care or return to ED, if symptoms recur, worsen or new symptoms develop. Discharge Disposition:  Home or Self Care    Discharge activity:  Activity as tolerated    Code status at discharge:  DNR     Outpatient follow up:  Please follow up with your PCP in one week       Future appointments-  Future Appointments   Date Time Provider Mello Pacheco   6/7/2021  2:30 PM Flakita Carbajal NP [facility] BS AMB   6/21/2021  3:15 PM REMOTE1, DORIS NAGEL BS AMB   7/8/2021  1:20 PM MD ROBE Arce BS AMB   9/27/2021  8:45 AM REMOTE1, DORIS NAGEL BS AMB   1/31/2022  8:15 AM REMOTE1, DORIS NAGEL BS AMB   5/3/2022  1:20 PM PACEMAKER3, DORIS NAGEL BS AMB   5/3/2022  2:00 PM MD ROBE Bernstein BS AMB     Follow-up Information     Follow up With Specialties Details [facility] On 6/7/2021 2:30 [address] [id number]   [address]  Resume home health services. [address] [address]  [phone]    Eliza Blackmon NP Nurse Practitioner   [address] [place], [place] [id number]  [phone]            Operative procedures performed:      Consults:  IP CONSULT TO ADVANCED HEART FAILURE  IP CONSULT TO ADVANCED HEART FAILURE  IP CONSULT TO INFECTIOUS DISEASES  IP CONSULT TO ELECTROPHYSIOLOGY  IP CONSULT TO NEUROLOGY  IP CONSULT TO INTENSIVIST  IP CONSULT TO PALLIATIVE CARE - PROVIDER    Procedures:   * No surgery found *    Diet:  DIET FULL LIQUID  DIET ONE TIME MESSAGE    Pertinent test results:  XR ABD (KUB)    Result Date: 5/21/2021  EXAM: XR ABD (KUB) INDICATION: Abdominal distention, gas pain. COMPARISON: May 11. FINDINGS: A supine radiograph of the abdomen shows an LVAD device and median sternotomy wires. The patient is morbidly obese. There is prominent gas throughout the colon but no evidence of small bowel dilatation. Mild gaseous distention of the colon. No evidence of bowel obstruction. XR ABD (KUB)    Result Date: 5/11/2021  INDICATION:  OG tube insertion EXAMINATION:  ABDOMEN KUB COMPARISON: April 24, 2021 FINDINGS: AP radiograph of the abdomen demonstrates a nonobstructive bowel gas pattern. Nasogastric tube tip projects over the gastroesophageal junction. Left ventricular assist device present. No abnormal calcifications are identified. The osseous structures are normal.     Nasogastric tube tip projects over the gastroesophageal junction. IR INSERT TUNL CVC W/O PORT OVER 5 YR    Result Date: 5/5/2021  PROCEDURE: Permacath insertion ESTIMATED BLOOD LOSS: Less than 5mL OPERATING PHYSICIAN: JULIO Kim Ring: None PRE PROCEDURE DIAGNOSIS:  Dialysis POST PROCEDURE DIAGNOSIS: SAME FLUOROSCOPY DOSE (air kerma): 5.68 mGy Procedure and findings: The risks and benefits of the procedure were discussed with the patient. Written consent was obtained. Prophylactic antibiotic of Ancef 2 g was administered prior to the intervention and discontinued prior to the completion of the procedure. Maximal sterile barrier technique (cap and mask and sterile gown and sterile gloves and a large sterile drape and hand hygiene and cutaneous antisepsis) was utilized for this procedure. 1% lidocaine was injected locally. A 0.035 inch guidewire was then advanced through the a pre-existing Antonio catheter to the level of the cavoatrial junction.  Dilatation of the tract was performed. A peel-away sheath was inserted over the guidewire. Lidocaine was then injected along the planned tunnel tract. A small dermatotomy was made over the right upper chest. A tunneling device was inserted at the dermatotomy site and advanced to the venotomy site. A permacath dialysis catheter was then pulled through the tunnel tract and inserted into the peel-away sheath . The peel-away sheath was removed. The tip of the catheter was positioned at the cavoatrial junction under fluoroscopic guidance. The catheter was sutured to the skin and dressed appropriately. The venotomy site was closed with a single suture. The patient tolerated the procedure well. There were no immediate complications. The catheter demonstrates appropriate blood flow. Successful tunneled right sided dialysis catheter placement as described above. The catheter is functioning and is ready for use. CT HEAD WO CONT    Result Date: 5/25/2021  EXAM:  CT HEAD WO CONT INDICATION: Acute confusion. COMPARISON: CT 5/11/2021 TECHNIQUE: Axial noncontrast head CT from foramen magnum to vertex. Coronal and sagittal reformatted images were obtained. CT dose reduction was achieved through use of a standardized protocol tailored for this examination and automatic exposure control for dose modulation. Adaptive statistical iterative reconstruction (ASIR) was utilized. FINDINGS:  There is diffuse age-related parenchymal volume loss. The ventricles and sulci are age-appropriate without hydrocephalus. There is no mass effect or midline shift. There is no intracranial hemorrhage or extra-axial fluid collection. A probable subcentimeter right frontal meningioma is again noted. 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 There is no new abnormal parenchymal attenuation. The basal cisterns are patent. The osseous structures are intact. The visualized paranasal sinuses and mastoid air cells are clear.      No acute intracranial abnormality. CT HEAD WO CONT    Result Date: 5/11/2021  INDICATION: AMS EXAM:  HEAD CT WITHOUT CONTRAST COMPARISON: November 11, 2020 TECHNIQUE:  Routine noncontrast axial head CT was performed. Sagittal and coronal reconstructions were generated. CT dose reduction was achieved through use of a standardized protocol tailored for this examination and automatic exposure control for dose modulation. FINDINGS: Ventricles: Midline, no hydrocephalus. Intracranial Hemorrhage: None. Brain Parenchyma/Brainstem: Unchanged chronic infarctions right cerebellum and right parietotemporal junction. Basal Cisterns: Normal. Paranasal Sinuses: Visualized sinuses are clear. Additional Comments: 7 mm partly calcified extra-axial focus right frontal convexity likely a small meningioma. No acute process. XR CHEST PORT    Result Date: 5/25/2021  INDICATION:  Respiratory failure. Altered neurologic status. Follow-up. COMPARISON:  Chest x-ray 5/11/21 FINDINGS: A portable AP upright radiograph of the chest was obtained at 1412 hours. Right-sided pacemaker type device and lead wires again demonstrated and unchanged. The right-sided tunneled dialysis catheter is also unchanged. Left-sided triple lumen central venous catheter is also unchanged and adequately positioned. The LVAD device is seen at the bottom of the film and appears unchanged in position. Cardiomegaly is stable. Minimal if any pulmonary vascular congestion. No confluent airspace disease or consolidation. No evidence of pleural fluid or pneumothorax. .     1. LVAD, catheters and pacing wires are all similar to prior exam. 2. No definite congestion. No confluent airspace disease. XR CHEST PORT    Result Date: 5/11/2021  INDICATION: CVL. Portable AP semiupright view of the chest. Direct comparison made to prior chest x-ray dated May 11, 2021. Cardiomediastinal silhouette is stable. Median sternotomy wires are noted.  There has been interval placement of a left internal jugular dual-lumen central venous catheter extends to the mid SVC. Tubes and lines are otherwise unchanged. There is a probable small right pleural effusion and mild right basilar atelectasis, unchanged. Left lung is clear. There is no pneumothorax. Tubes and lines, as described above. No pneumothorax. XR CHEST PORT    Result Date: 5/11/2021  INDICATION: Intubation EXAMINATION:  AP CHEST, PORTABLE COMPARISON: May 11, 2020 FINDINGS: Single AP portable view of the chest demonstrates placement of endotracheal tube with tip at the thoracic inlet. Nasogastric tube present with tip at the gastroesophageal junction. The cardiomediastinal silhouette is unchanged. There is no new airspace disease. Endotracheal tube tip at the thoracic inlet. Nasogastric tube tip at the gastroesophageal junction. XR CHEST PORT    Result Date: 5/11/2021  INDICATION: Hypoxia EXAMINATION:  AP CHEST, PORTABLE COMPARISON: April 27, 2021 FINDINGS: Single AP portable view of the chest demonstrates no change in position of the lines and tubes. Left ventricular assist device partly visualized. Prior median sternotomy and cardiomegaly. No pulmonary edema or focal airspace disease. Cardiomegaly. No pulmonary edema. ECHO ADULT FOLLOW-UP OR LIMITED    Result Date: 5/11/2021  · Image quality for this study was technically difficult. · LV: Left ventricular assist device is present. Estimated LVEF is <15%. Dilated left ventricle. Mild concentric hypertrophy. · RV: Moderately dilated right ventricle. Mildly reduced systolic function. Pacer/ICD present. · TV: Mild to moderate tricuspid valve regurgitation is present. · PA: Mild pulmonary hypertension. Pulmonary arterial systolic pressure is 40 mmHg.         Recent Results (from the past 168 hour(s))   GLUCOSE, POC    Collection Time: 05/21/21  5:39 PM   Result Value Ref Range    Glucose (POC) 125 (H) 65 - 117 mg/dL    Performed by Mikki Lyn, POC    Collection Time: 05/21/21  9:10 PM   Result Value Ref Range    Glucose (POC) 123 (H) 65 - 117 mg/dL    Performed by [id number]    Collection Time: 05/22/21  4:09 AM   Result Value Ref Range    Magnesium 2.5 (H) 1.6 - 2.4 mg/dL   PHOSPHORUS    Collection Time: 05/22/21  4:09 AM   Result Value Ref Range    Phosphorus 4.7 2.6 - 4.7 MG/DL   PROTHROMBIN TIME + INR    Collection Time: 05/22/21  4:09 AM   Result Value Ref Range    INR 1.5 (H) 0.9 - 1.1      Prothrombin time 15.6 (H) 9.0 - 11.1 sec   PROCALCITONIN    Collection Time: 05/22/21  4:09 AM   Result Value Ref Range    Procalcitonin 0.85 ng/mL   LD    Collection Time: 05/22/21  4:09 AM   Result Value Ref Range     81 - 246 U/L   CBC WITH AUTOMATED DIFF    Collection Time: 05/22/21  4:09 AM   Result Value Ref Range    WBC 4.2 3.6 - 11.0 K/uL    RBC 2.97 (L) 3.80 - 5.20 M/uL    HGB 8.1 (L) 11.5 - 16.0 g/dL    HCT 27.7 (L) 35.0 - 47.0 %    MCV 93.3 80.0 - 99.0 FL    MCH 27.3 26.0 - 34.0 PG    MCHC 29.2 (L) 30.0 - 36.5 g/dL    RDW 19.8 (H) 11.5 - 14.5 %    PLATELET 053 (L) 870 - 400 K/uL    MPV 9.0 8.9 - 12.9 FL    NRBC 0.0 0  WBC    ABSOLUTE NRBC 0.00 0.00 - 0.01 K/uL    NEUTROPHILS 79 (H) 32 - 75 %    LYMPHOCYTES 11 (L) 12 - 49 %    MONOCYTES 8 5 - 13 %    EOSINOPHILS 0 0 - 7 %    BASOPHILS 1 0 - 1 %    IMMATURE GRANULOCYTES 1 (H) 0.0 - 0.5 %    ABS. NEUTROPHILS 3.4 1.8 - 8.0 K/UL    ABS. LYMPHOCYTES 0.5 (L) 0.8 - 3.5 K/UL    ABS. MONOCYTES 0.3 0.0 - 1.0 K/UL    ABS. EOSINOPHILS 0.0 0.0 - 0.4 K/UL    ABS. BASOPHILS 0.0 0.0 - 0.1 K/UL    ABS. IMM.  GRANS. 0.0 0.00 - 0.04 K/UL    DF SMEAR SCANNED      RBC COMMENTS ANISOCYTOSIS  2+        RBC COMMENTS OVALOCYTES  1+        RBC COMMENTS POLYCHROMASIA  PRESENT        RBC COMMENTS SCHISTOCYTES  PRESENT       METABOLIC PANEL, COMPREHENSIVE    Collection Time: 05/22/21  4:09 AM   Result Value Ref Range    Sodium 135 (L) 136 - 145 mmol/L    Potassium 3.9 3.5 - 5.1 mmol/L    Chloride 100 97 - 108 mmol/L CO2 25 21 - 32 mmol/L    Anion gap 10 5 - 15 mmol/L    Glucose 125 (H) 65 - 100 mg/dL    BUN 28 (H) 6 - 20 MG/DL    Creatinine 4.97 (H) 0.55 - 1.02 MG/DL    BUN/Creatinine ratio 6 (L) 12 - 20      GFR est AA 11 (L) >60 ml/min/1.73m2    GFR est non-AA 9 (L) >60 ml/min/1.73m2    Calcium 9.0 8.5 - 10.1 MG/DL    Bilirubin, total 0.6 0.2 - 1.0 MG/DL    ALT (SGPT) 8 (L) 12 - 78 U/L    AST (SGOT) 16 15 - 37 U/L    Alk.  phosphatase 53 45 - 117 U/L    Protein, total 7.1 6.4 - 8.2 g/dL    Albumin 2.9 (L) 3.5 - 5.0 g/dL    Globulin 4.2 (H) 2.0 - 4.0 g/dL    A-G Ratio 0.7 (L) 1.1 - 2.2     FERRITIN    Collection Time: 05/22/21  4:09 AM   Result Value Ref Range    Ferritin 212 8 - 252 NG/ML   URINALYSIS W/ REFLEX CULTURE    Collection Time: 05/22/21  5:30 AM    Specimen: Miscellaneous sample; Urine    Urine specimen   Result Value Ref Range    Color DARK YELLOW      Appearance CLOUDY (A) CLEAR      Specific gravity 1.023 1.003 - 1.030      pH (UA) 5.0 5.0 - 8.0      Protein 100 (A) NEG mg/dL    Glucose Negative NEG mg/dL    Ketone Negative NEG mg/dL    Blood Negative NEG      Urobilinogen 0.2 0.2 - 1.0 EU/dL    Nitrites Positive (A) NEG      Leukocyte Esterase SMALL (A) NEG      WBC 0-4 0 - 4 /hpf    RBC 0-5 0 - 5 /hpf    Epithelial cells FEW FEW /lpf    Bacteria 1+ (A) NEG /hpf    UA:UC IF INDICATED CULTURE NOT INDICATED BY UA RESULT CNI     BILIRUBIN, CONFIRM    Collection Time: 05/22/21  5:30 AM   Result Value Ref Range    Bilirubin UA, confirm Negative NEG     GLUCOSE, POC    Collection Time: 05/22/21  6:18 AM   Result Value Ref Range    Glucose (POC) 123 (H) 65 - 117 mg/dL    Performed by Katherine 60, POC    Collection Time: 05/22/21 11:47 AM   Result Value Ref Range    Glucose (POC) 105 65 - 117 mg/dL    Performed by Albert CEE, POC    Collection Time: 05/22/21  4:41 PM   Result Value Ref Range    Glucose (POC) 98 65 - 117 mg/dL    Performed by [address] [place] [address] Collection Time: 05/22/21  9:16 PM   Result Value Ref Range    Glucose (POC) 129 (H) 65 - 117 mg/dL    Performed by Lyn Godwin (AYAKA)    PROTHROMBIN TIME + INR    Collection Time: 05/23/21  3:32 AM   Result Value Ref Range    INR 1.3 (H) 0.9 - 1.1      Prothrombin time 13.8 (H) 9.0 - 11.1 sec   PROCALCITONIN    Collection Time: 05/23/21  3:32 AM   Result Value Ref Range    Procalcitonin 0.57 ng/mL   LD    Collection Time: 05/23/21  3:32 AM   Result Value Ref Range     81 - 456 U/L   METABOLIC PANEL, COMPREHENSIVE    Collection Time: 05/23/21  3:32 AM   Result Value Ref Range    Sodium 136 136 - 145 mmol/L    Potassium 3.8 3.5 - 5.1 mmol/L    Chloride 101 97 - 108 mmol/L    CO2 27 21 - 32 mmol/L    Anion gap 8 5 - 15 mmol/L    Glucose 112 (H) 65 - 100 mg/dL    BUN 14 6 - 20 MG/DL    Creatinine 3.14 (H) 0.55 - 1.02 MG/DL    BUN/Creatinine ratio 4 (L) 12 - 20      GFR est AA 18 (L) >60 ml/min/1.73m2    GFR est non-AA 15 (L) >60 ml/min/1.73m2    Calcium 8.8 8.5 - 10.1 MG/DL    Bilirubin, total 0.7 0.2 - 1.0 MG/DL    ALT (SGPT) 6 (L) 12 - 78 U/L    AST (SGOT) 16 15 - 37 U/L    Alk. phosphatase 54 45 - 117 U/L    Protein, total 7.2 6.4 - 8.2 g/dL    Albumin 3.0 (L) 3.5 - 5.0 g/dL    Globulin 4.2 (H) 2.0 - 4.0 g/dL    A-G Ratio 0.7 (L) 1.1 - 2.2     CBC WITH AUTOMATED DIFF    Collection Time: 05/23/21  3:32 AM   Result Value Ref Range    WBC 4.2 3.6 - 11.0 K/uL    RBC 3.26 (L) 3.80 - 5.20 M/uL    HGB 8.9 (L) 11.5 - 16.0 g/dL    HCT 29.8 (L) 35.0 - 47.0 %    MCV 91.4 80.0 - 99.0 FL    MCH 27.3 26.0 - 34.0 PG    MCHC 29.9 (L) 30.0 - 36.5 g/dL    RDW 19.4 (H) 11.5 - 14.5 %    PLATELET 662 (L) 935 - 400 K/uL    MPV 8.9 8.9 - 12.9 FL    NRBC 0.0 0  WBC    ABSOLUTE NRBC 0.00 0.00 - 0.01 K/uL    NEUTROPHILS 80 (H) 32 - 75 %    LYMPHOCYTES 10 (L) 12 - 49 %    MONOCYTES 8 5 - 13 %    EOSINOPHILS 0 0 - 7 %    BASOPHILS 1 0 - 1 %    IMMATURE GRANULOCYTES 1 (H) 0.0 - 0.5 %    ABS. NEUTROPHILS 3.5 1.8 - 8.0 K/UL    ABS. LYMPHOCYTES 0.4 (L) 0.8 - 3.5 K/UL    ABS. MONOCYTES 0.3 0.0 - 1.0 K/UL    ABS. EOSINOPHILS 0.0 0.0 - 0.4 K/UL    ABS. BASOPHILS 0.0 0.0 - 0.1 K/UL    ABS. IMM.  GRANS. 0.0 0.00 - 0.04 K/UL    DF SMEAR SCANNED      RBC COMMENTS ANISOCYTOSIS  1+       GLUCOSE, POC    Collection Time: 05/23/21  7:25 AM   Result Value Ref Range    Glucose (POC) 116 65 - 117 mg/dL    Performed by Raymundo Reid    GLUCOSE, POC    Collection Time: 05/23/21 11:28 AM   Result Value Ref Range    Glucose (POC) 168 (H) 65 - 117 mg/dL    Performed by Gabriela Betancourt    GLUCOSE, POC    Collection Time: 05/23/21 12:06 PM   Result Value Ref Range    Glucose (POC) 141 (H) 65 - 117 mg/dL    Performed by Raymundo Reid    GLUCOSE, POC    Collection Time: 05/23/21  5:09 PM   Result Value Ref Range    Glucose (POC) 127 (H) 65 - 117 mg/dL    Performed by Raymundo Reid    GLUCOSE, POC    Collection Time: 05/23/21  9:26 PM   Result Value Ref Range    Glucose (POC) 122 (H) 65 - 117 mg/dL    Performed by Zack Ibrahim    MAGNESIUM    Collection Time: 05/24/21 12:17 AM   Result Value Ref Range    Magnesium 2.1 1.6 - 2.4 mg/dL   PHOSPHORUS    Collection Time: 05/24/21 12:17 AM   Result Value Ref Range    Phosphorus 3.8 2.6 - 4.7 MG/DL   PROTHROMBIN TIME + INR    Collection Time: 05/24/21 12:17 AM   Result Value Ref Range    INR 1.3 (H) 0.9 - 1.1      Prothrombin time 13.5 (H) 9.0 - 11.1 sec   PROCALCITONIN    Collection Time: 05/24/21 12:17 AM   Result Value Ref Range    Procalcitonin 0.55 ng/mL   LD    Collection Time: 05/24/21 12:17 AM   Result Value Ref Range     81 - 055 U/L   METABOLIC PANEL, COMPREHENSIVE    Collection Time: 05/24/21 12:17 AM   Result Value Ref Range    Sodium 134 (L) 136 - 145 mmol/L    Potassium 3.6 3.5 - 5.1 mmol/L    Chloride 99 97 - 108 mmol/L    CO2 27 21 - 32 mmol/L    Anion gap 8 5 - 15 mmol/L    Glucose 119 (H) 65 - 100 mg/dL    BUN 22 (H) 6 - 20 MG/DL    Creatinine 4.15 (H) 0.55 - 1.02 MG/DL    BUN/Creatinine ratio 5 (L) 12 - 20      GFR est AA 13 (L) >60 ml/min/1.73m2    GFR est non-AA 11 (L) >60 ml/min/1.73m2    Calcium 8.9 8.5 - 10.1 MG/DL    Bilirubin, total 0.6 0.2 - 1.0 MG/DL    ALT (SGPT) 7 (L) 12 - 78 U/L    AST (SGOT) 17 15 - 37 U/L    Alk. phosphatase 52 45 - 117 U/L    Protein, total 7.4 6.4 - 8.2 g/dL    Albumin 3.0 (L) 3.5 - 5.0 g/dL    Globulin 4.4 (H) 2.0 - 4.0 g/dL    A-G Ratio 0.7 (L) 1.1 - 2.2     CBC WITH AUTOMATED DIFF    Collection Time: 05/24/21 12:17 AM   Result Value Ref Range    WBC 4.6 3.6 - 11.0 K/uL    RBC 3.22 (L) 3.80 - 5.20 M/uL    HGB 8.7 (L) 11.5 - 16.0 g/dL    HCT 29.7 (L) 35.0 - 47.0 %    MCV 92.2 80.0 - 99.0 FL    MCH 27.0 26.0 - 34.0 PG    MCHC 29.3 (L) 30.0 - 36.5 g/dL    RDW 19.5 (H) 11.5 - 14.5 %    PLATELET 853 (L) 947 - 400 K/uL    MPV 8.8 (L) 8.9 - 12.9 FL    NRBC 0.0 0  WBC    ABSOLUTE NRBC 0.00 0.00 - 0.01 K/uL    NEUTROPHILS 78 (H) 32 - 75 %    LYMPHOCYTES 11 (L) 12 - 49 %    MONOCYTES 9 5 - 13 %    EOSINOPHILS 0 0 - 7 %    BASOPHILS 1 0 - 1 %    IMMATURE GRANULOCYTES 1 (H) 0.0 - 0.5 %    ABS. NEUTROPHILS 3.7 1.8 - 8.0 K/UL    ABS. LYMPHOCYTES 0.5 (L) 0.8 - 3.5 K/UL    ABS. MONOCYTES 0.4 0.0 - 1.0 K/UL    ABS. EOSINOPHILS 0.0 0.0 - 0.4 K/UL    ABS. BASOPHILS 0.0 0.0 - 0.1 K/UL    ABS. IMM.  GRANS. 0.0 0.00 - 0.04 K/UL    DF SMEAR SCANNED      RBC COMMENTS ANISOCYTOSIS  1+       GLUCOSE, POC    Collection Time: 05/24/21  6:07 AM   Result Value Ref Range    Glucose (POC) 143 (H) 65 - 117 mg/dL    Performed by Firman Sandifer    GLUCOSE, POC    Collection Time: 05/24/21 11:42 AM   Result Value Ref Range    Glucose (POC) 111 65 - 117 mg/dL    Performed by SHA GARDNER    POC EG7    Collection Time: 05/24/21  1:35 PM   Result Value Ref Range    Calcium, ionized (POC) 1.17 1.12 - 1.32 mmol/L    pH (POC) 7.38 7.35 - 7.45      pCO2 (POC) 41.1 35.0 - 45.0 MMHG    pO2 (POC) 62 (L) 80 - 100 MMHG    HCO3 (POC) 24.3 22 - 26 MMOL/L    Base deficit (POC) 0.8 mmol/L    sO2 (POC) 91.0 (L) 92 - 97 %    Specimen type (POC) ARTERIAL     GLUCOSE, POC    Collection Time: 05/24/21  5:00 PM   Result Value Ref Range    Glucose (POC) 120 (H) 65 - 117 mg/dL    Performed by [facility]    GLUCOSE, POC    Collection Time: 05/24/21  9:24 PM   Result Value Ref Range    Glucose (POC) 128 (H) 65 - 117 mg/dL    Performed by SHA GARDNER    GLUCOSE, POC    Collection Time: 05/25/21  3:40 AM   Result Value Ref Range    Glucose (POC) 119 (H) 65 - 117 mg/dL    Performed by [employer] + INR    Collection Time: 05/25/21  3:49 AM   Result Value Ref Range    INR 1.5 (H) 0.9 - 1.1      Prothrombin time 15.8 (H) 9.0 - 11.1 sec   LD    Collection Time: 05/25/21  3:49 AM   Result Value Ref Range     81 - 246 U/L   CBC WITH AUTOMATED DIFF    Collection Time: 05/25/21  3:49 AM   Result Value Ref Range    WBC 4.7 3.6 - 11.0 K/uL    RBC 3.12 (L) 3.80 - 5.20 M/uL    HGB 8.6 (L) 11.5 - 16.0 g/dL    HCT 28.7 (L) 35.0 - 47.0 %    MCV 92.0 80.0 - 99.0 FL    MCH 27.6 26.0 - 34.0 PG    MCHC 30.0 30.0 - 36.5 g/dL    RDW 19.9 (H) 11.5 - 14.5 %    PLATELET 154 (L) 721 - 400 K/uL    MPV 8.9 8.9 - 12.9 FL    NRBC 0.0 0  WBC    ABSOLUTE NRBC 0.00 0.00 - 0.01 K/uL    NEUTROPHILS 79 (H) 32 - 75 %    LYMPHOCYTES 11 (L) 12 - 49 %    MONOCYTES 9 5 - 13 %    EOSINOPHILS 0 0 - 7 %    BASOPHILS 1 0 - 1 %    IMMATURE GRANULOCYTES 0 0.0 - 0.5 %    ABS. NEUTROPHILS 3.8 1.8 - 8.0 K/UL    ABS. LYMPHOCYTES 0.5 (L) 0.8 - 3.5 K/UL    ABS. MONOCYTES 0.4 0.0 - 1.0 K/UL    ABS. EOSINOPHILS 0.0 0.0 - 0.4 K/UL    ABS. BASOPHILS 0.0 0.0 - 0.1 K/UL    ABS. IMM.  GRANS. 0.0 0.00 - 0.04 K/UL    DF SMEAR SCANNED      RBC COMMENTS ANISOCYTOSIS  1+        RBC COMMENTS TEARDROP CELLS  PRESENT        RBC COMMENTS OVALOCYTES  1+        RBC COMMENTS MICROCYTOSIS  1+       METABOLIC PANEL, COMPREHENSIVE    Collection Time: 05/25/21  3:49 AM   Result Value Ref Range    Sodium 137 136 - 145 mmol/L    Potassium 3.3 (L) 3.5 - 5.1 mmol/L    Chloride 100 97 - 108 mmol/L    CO2 25 21 - 32 mmol/L    Anion gap 12 5 - 15 mmol/L    Glucose 121 (H) 65 - 100 mg/dL    BUN 27 (H) 6 - 20 MG/DL    Creatinine 5.11 (H) 0.55 - 1.02 MG/DL    BUN/Creatinine ratio 5 (L) 12 - 20      GFR est AA 10 (L) >60 ml/min/1.73m2    GFR est non-AA 8 (L) >60 ml/min/1.73m2    Calcium 9.2 8.5 - 10.1 MG/DL    Bilirubin, total 0.6 0.2 - 1.0 MG/DL    ALT (SGPT) 8 (L) 12 - 78 U/L    AST (SGOT) 16 15 - 37 U/L    Alk.  phosphatase 54 45 - 117 U/L    Protein, total 7.5 6.4 - 8.2 g/dL    Albumin 3.2 (L) 3.5 - 5.0 g/dL    Globulin 4.3 (H) 2.0 - 4.0 g/dL    A-G Ratio 0.7 (L) 1.1 - 2.2     MAGNESIUM    Collection Time: 05/25/21  3:49 AM   Result Value Ref Range    Magnesium 2.4 1.6 - 2.4 mg/dL   PHOSPHORUS    Collection Time: 05/25/21  3:49 AM   Result Value Ref Range    Phosphorus 4.8 (H) 2.6 - 4.7 MG/DL   POC EG7    Collection Time: 05/25/21  3:56 AM   Result Value Ref Range    Calcium, ionized (POC) 1.14 1.12 - 1.32 mmol/L    FIO2 (POC) 21 %    pH (POC) 7.39 7.35 - 7.45      pCO2 (POC) 40.2 35.0 - 45.0 MMHG    pO2 (POC) 72 (L) 80 - 100 MMHG    HCO3 (POC) 24.3 22 - 26 MMOL/L    Base deficit (POC) 0.7 mmol/L    sO2 (POC) 94.1 92 - 97 %    Site LEFT RADIAL      Device: CPAP      Mode Pressure Support      PEEP/CPAP (POC) 10 cmH2O    Allens test (POC) Positive      Specimen type (POC) ARTERIAL     AMMONIA    Collection Time: 05/25/21  4:29 AM   Result Value Ref Range    Ammonia <10 <32 UMOL/L   GLUCOSE, POC    Collection Time: 05/25/21  6:44 AM   Result Value Ref Range    Glucose (POC) 156 (H) 65 - 117 mg/dL    Performed by Samantha Manriquez    POC EG7    Collection Time: 05/25/21 11:44 AM   Result Value Ref Range    Calcium, ionized (POC) 1.13 1.12 - 1.32 mmol/L    pH (POC) 7.37 7.35 - 7.45      pCO2 (POC) 47.7 (H) 35.0 - 45.0 MMHG    pO2 (POC) 49 (LL) 80 - 100 MMHG    HCO3 (POC) 27.8 (H) 22 - 26 MMOL/L    Base excess (POC) 2.0 mmol/L    sO2 (POC) 82.3 (L) 92 - 97 %    Site RIGHT RADIAL Device: CPAP      Allens test (POC) Positive      Specimen type (POC) ARTERIAL      Critical value read back [facility]    METABOLIC PANEL, COMPREHENSIVE    Collection Time: 05/25/21 11:52 AM   Result Value Ref Range    Sodium 137 136 - 145 mmol/L    Potassium 3.4 (L) 3.5 - 5.1 mmol/L    Chloride 102 97 - 108 mmol/L    CO2 27 21 - 32 mmol/L    Anion gap 8 5 - 15 mmol/L    Glucose 123 (H) 65 - 100 mg/dL    BUN 10 6 - 20 MG/DL    Creatinine 2.27 (H) 0.55 - 1.02 MG/DL    BUN/Creatinine ratio 4 (L) 12 - 20      GFR est AA 26 (L) >60 ml/min/1.73m2    GFR est non-AA 21 (L) >60 ml/min/1.73m2    Calcium 8.7 8.5 - 10.1 MG/DL    Bilirubin, total 0.6 0.2 - 1.0 MG/DL    ALT (SGPT) 7 (L) 12 - 78 U/L    AST (SGOT) 19 15 - 37 U/L    Alk.  phosphatase 56 45 - 117 U/L    Protein, total 7.8 6.4 - 8.2 g/dL    Albumin 3.3 (L) 3.5 - 5.0 g/dL    Globulin 4.5 (H) 2.0 - 4.0 g/dL    A-G Ratio 0.7 (L) 1.1 - 2.2     LACTIC ACID    Collection Time: 05/25/21 11:52 AM   Result Value Ref Range    Lactic acid 1.1 0.4 - 2.0 MMOL/L   MAGNESIUM    Collection Time: 05/25/21 11:52 AM   Result Value Ref Range    Magnesium 2.2 1.6 - 2.4 mg/dL   GLUCOSE, POC    Collection Time: 05/25/21 12:01 PM   Result Value Ref Range    Glucose (POC) 124 (H) 65 - 117 mg/dL    Performed by Liz Davalos  PCT    PROCALCITONIN    Collection Time: 05/25/21  1:43 PM   Result Value Ref Range    Procalcitonin 0.43 ng/mL   NT-PRO BNP    Collection Time: 05/25/21  1:43 PM   Result Value Ref Range    NT pro-BNP 21,901 (H) <125 PG/ML   POC EG7    Collection Time: 05/25/21  2:15 PM   Result Value Ref Range    Calcium, ionized (POC) 1.16 1.12 - 1.32 mmol/L    pH (POC) 7.41 7.35 - 7.45      pCO2 (POC) 38.3 35.0 - 45.0 MMHG    pO2 (POC) 74 (L) 80 - 100 MMHG    HCO3 (POC) 24.5 22 - 26 MMOL/L    Base excess (POC) 0.0 mmol/L    sO2 (POC) 95.0 92 - 97 %    Site RIGHT RADIAL      Device: CPAP      Allens test (POC) Positive      Specimen type (POC) ARTERIAL     LEGIONELLA PNEUMOPHILA AG, URINE    Collection Time: 05/25/21  6:45 PM    Specimen: Urine   Result Value Ref Range    Source URINE      L pneumophila S1 Ag, urine Negative Negative     GLUCOSE, POC    Collection Time: 05/25/21  9:42 PM   Result Value Ref Range    Glucose (POC) 124 (H) 65 - 117 mg/dL    Performed by Yeni SALAZAR    Collection Time: 05/26/21  2:55 AM   Result Value Ref Range     81 - 246 U/L   MAGNESIUM    Collection Time: 05/26/21  2:55 AM   Result Value Ref Range    Magnesium 2.5 (H) 1.6 - 2.4 mg/dL   NT-PRO BNP    Collection Time: 05/26/21  2:55 AM   Result Value Ref Range    NT pro-BNP 24,036 (H) <125 PG/ML   PHOSPHORUS    Collection Time: 05/26/21  2:55 AM   Result Value Ref Range    Phosphorus 3.3 2.6 - 4.7 MG/DL   CBC WITH AUTOMATED DIFF    Collection Time: 05/26/21  2:55 AM   Result Value Ref Range    WBC 2.8 (L) 3.6 - 11.0 K/uL    RBC 2.98 (L) 3.80 - 5.20 M/uL    HGB 8.3 (L) 11.5 - 16.0 g/dL    HCT 28.1 (L) 35.0 - 47.0 %    MCV 94.3 80.0 - 99.0 FL    MCH 27.9 26.0 - 34.0 PG    MCHC 29.5 (L) 30.0 - 36.5 g/dL    RDW 19.6 (H) 11.5 - 14.5 %    PLATELET 583 (L) 019 - 400 K/uL    MPV 9.0 8.9 - 12.9 FL    NRBC 0.0 0  WBC    ABSOLUTE NRBC 0.00 0.00 - 0.01 K/uL    NEUTROPHILS 65 32 - 75 %    LYMPHOCYTES 22 12 - 49 %    MONOCYTES 11 5 - 13 %    EOSINOPHILS 0 0 - 7 %    BASOPHILS 1 0 - 1 %    IMMATURE GRANULOCYTES 1 (H) 0.0 - 0.5 %    ABS. NEUTROPHILS 1.9 1.8 - 8.0 K/UL    ABS. LYMPHOCYTES 0.6 (L) 0.8 - 3.5 K/UL    ABS. MONOCYTES 0.3 0.0 - 1.0 K/UL    ABS. EOSINOPHILS 0.0 0.0 - 0.4 K/UL    ABS. BASOPHILS 0.0 0.0 - 0.1 K/UL    ABS. IMM.  GRANS. 0.0 0.00 - 0.04 K/UL    DF SMEAR SCANNED      RBC COMMENTS ANISOCYTOSIS  2+        RBC COMMENTS MACROCYTOSIS  1+        RBC COMMENTS POLYCHROMASIA  PRESENT       PROCALCITONIN    Collection Time: 05/26/21  2:55 AM   Result Value Ref Range    Procalcitonin 0.43 ng/mL   PROTHROMBIN TIME + INR    Collection Time: 05/26/21  2:55 AM   Result Value Ref Range    INR 1.9 (H) 0.9 - 1.1      Prothrombin time 18.9 (H) 9.0 - 92.8 sec   METABOLIC PANEL, COMPREHENSIVE    Collection Time: 05/26/21  2:55 AM   Result Value Ref Range    Sodium 139 136 - 145 mmol/L    Potassium 3.9 3.5 - 5.1 mmol/L    Chloride 104 97 - 108 mmol/L    CO2 27 21 - 32 mmol/L    Anion gap 8 5 - 15 mmol/L    Glucose 134 (H) 65 - 100 mg/dL    BUN 16 6 - 20 MG/DL    Creatinine 3.48 (H) 0.55 - 1.02 MG/DL    BUN/Creatinine ratio 5 (L) 12 - 20      GFR est AA 16 (L) >60 ml/min/1.73m2    GFR est non-AA 13 (L) >60 ml/min/1.73m2    Calcium 9.4 8.5 - 10.1 MG/DL    Bilirubin, total 0.6 0.2 - 1.0 MG/DL    ALT (SGPT) 8 (L) 12 - 78 U/L    AST (SGOT) 16 15 - 37 U/L    Alk.  phosphatase 50 45 - 117 U/L    Protein, total 7.1 6.4 - 8.2 g/dL    Albumin 3.1 (L) 3.5 - 5.0 g/dL    Globulin 4.0 2.0 - 4.0 g/dL    A-G Ratio 0.8 (L) 1.1 - 2.2     URIC ACID    Collection Time: 05/26/21  2:55 AM   Result Value Ref Range    Uric acid 4.6 2.6 - 6.0 MG/DL   GLUCOSE, POC    Collection Time: 05/26/21  7:15 AM   Result Value Ref Range    Glucose (POC) 114 65 - 117 mg/dL    Performed by Yudelka Leroy    GLUCOSE, POC    Collection Time: 05/26/21  9:28 AM   Result Value Ref Range    Glucose (POC) 115 65 - 117 mg/dL    Performed by [address] [place] [address], POC    Collection Time: 05/26/21 12:42 PM   Result Value Ref Range    Glucose (POC) 119 (H) 65 - 117 mg/dL    Performed by [facility]    EKG, 12 LEAD, INITIAL    Collection Time: 05/26/21  1:37 PM   Result Value Ref Range    Ventricular Rate 71 BPM    Atrial Rate 71 BPM    P-R Interval 232 ms    QRS Duration 162 ms    Q-T Interval 506 ms    QTC Calculation (Bezet) 549 ms    Calculated R Axis -71 degrees    Calculated T Axis 99 degrees    Diagnosis       AV dual-paced rhythm  Confirmed by Stephanie Harrison M.D., Ashely Rebolledo ([id number]) on 5/27/2021 8:39:51 AM     GLUCOSE, POC    Collection Time: 05/26/21  5:20 PM   Result Value Ref Range    Glucose (POC) 92 65 - 117 mg/dL    Performed by Reji Valdez GLUCOSE, POC    Collection Time: 05/26/21 10:12 PM   Result Value Ref Range    Glucose (POC) 83 65 - 117 mg/dL    Performed by [facility] YOHANNES ELISEO    LD    Collection Time: 05/27/21  3:56 AM   Result Value Ref Range     81 - 246 U/L   MAGNESIUM    Collection Time: 05/27/21  3:56 AM   Result Value Ref Range    Magnesium 2.5 (H) 1.6 - 2.4 mg/dL   NT-PRO BNP    Collection Time: 05/27/21  3:56 AM   Result Value Ref Range    NT pro-BNP 17,985 (H) <125 PG/ML   PHOSPHORUS    Collection Time: 05/27/21  3:56 AM   Result Value Ref Range    Phosphorus 3.7 2.6 - 4.7 MG/DL   PROCALCITONIN    Collection Time: 05/27/21  3:56 AM   Result Value Ref Range    Procalcitonin 0.25 ng/mL   PROTHROMBIN TIME + INR    Collection Time: 05/27/21  3:56 AM   Result Value Ref Range    INR 2.0 (H) 0.9 - 1.1      Prothrombin time 20.3 (H) 9.0 - 11.1 sec   CBC WITH AUTOMATED DIFF    Collection Time: 05/27/21  3:56 AM   Result Value Ref Range    WBC 3.6 3.6 - 11.0 K/uL    RBC 2.87 (L) 3.80 - 5.20 M/uL    HGB 8.0 (L) 11.5 - 16.0 g/dL    HCT 27.0 (L) 35.0 - 47.0 %    MCV 94.1 80.0 - 99.0 FL    MCH 27.9 26.0 - 34.0 PG    MCHC 29.6 (L) 30.0 - 36.5 g/dL    RDW 19.7 (H) 11.5 - 14.5 %    PLATELET 157 (L) 727 - 400 K/uL    MPV 9.1 8.9 - 12.9 FL    NRBC 0.0 0  WBC    ABSOLUTE NRBC 0.00 0.00 - 0.01 K/uL    NEUTROPHILS 70 32 - 75 %    LYMPHOCYTES 17 12 - 49 %    MONOCYTES 11 5 - 13 %    EOSINOPHILS 0 0 - 7 %    BASOPHILS 1 0 - 1 %    IMMATURE GRANULOCYTES 1 (H) 0.0 - 0.5 %    ABS. NEUTROPHILS 2.6 1.8 - 8.0 K/UL    ABS. LYMPHOCYTES 0.6 (L) 0.8 - 3.5 K/UL    ABS. MONOCYTES 0.4 0.0 - 1.0 K/UL    ABS. EOSINOPHILS 0.0 0.0 - 0.4 K/UL    ABS. BASOPHILS 0.0 0.0 - 0.1 K/UL    ABS. IMM.  GRANS. 0.0 0.00 - 0.04 K/UL    DF SMEAR SCANNED      RBC COMMENTS ANISOCYTOSIS  1+        RBC COMMENTS POLYCHROMASIA  1+        RBC COMMENTS HYPOCHROMIA  1+        RBC COMMENTS SCHISTOCYTES  PRESENT       METABOLIC PANEL, COMPREHENSIVE    Collection Time: 05/27/21  3:56 AM Result Value Ref Range    Sodium 139 136 - 145 mmol/L    Potassium 3.2 (L) 3.5 - 5.1 mmol/L    Chloride 105 97 - 108 mmol/L    CO2 22 21 - 32 mmol/L    Anion gap 12 5 - 15 mmol/L    Glucose 89 65 - 100 mg/dL    BUN 23 (H) 6 - 20 MG/DL    Creatinine 4.11 (H) 0.55 - 1.02 MG/DL    BUN/Creatinine ratio 6 (L) 12 - 20      GFR est AA 13 (L) >60 ml/min/1.73m2    GFR est non-AA 11 (L) >60 ml/min/1.73m2    Calcium 9.0 8.5 - 10.1 MG/DL    Bilirubin, total 0.6 0.2 - 1.0 MG/DL    ALT (SGPT) 7 (L) 12 - 78 U/L    AST (SGOT) 16 15 - 37 U/L    Alk. phosphatase 46 45 - 117 U/L    Protein, total 6.8 6.4 - 8.2 g/dL    Albumin 3.0 (L) 3.5 - 5.0 g/dL    Globulin 3.8 2.0 - 4.0 g/dL    A-G Ratio 0.8 (L) 1.1 - 2.2     GLUCOSE, POC    Collection Time: 05/27/21  9:08 AM   Result Value Ref Range    Glucose (POC) 99 65 - 117 mg/dL    Performed by [address] [place] [address],[address], POC    Collection Time: 05/27/21  5:18 PM   Result Value Ref Range    Glucose (POC) 154 (H) 65 - 117 mg/dL    Performed by Maribel Wakefield    GLUCOSE, POC    Collection Time: 05/28/21  8:54 AM   Result Value Ref Range    Glucose (POC) 118 (H) 65 - 117 mg/dL    Performed by Bailey Luo [address] POC    Collection Time: 05/28/21 11:57 AM   Result Value Ref Range    Glucose (POC) 96 65 - 117 mg/dL    Performed by Melani Wheat            Physical Exam on Discharge:    Discharge condition: stable    Vital signs:   Patient Vitals for the past 12 hrs:   Temp Pulse Resp BP SpO2   05/28/21 1200 98.9 °F (37.2 °C) 99 28 (!) 130/93 100 %   05/28/21 0800 98.1 °F (36.7 °C) 97 22  95 %   05/28/21 0736     93 %   05/28/21 0400 98.5 °F (36.9 °C) 90 20  93 %       Visit Vitals  BP (!) 130/93 (BP 1 Location: Right upper arm, BP Patient Position: At rest)   Pulse 99   Temp 98.9 °F (37.2 °C)   Resp 28   Ht 5' 7\" (1.702 m)   Wt 101.3 kg (223 lb 4.8 oz)   SpO2 100%   BMI 34.97 kg/m²     General:  Alert, cooperative, no distress, appears stated age.    Head:  Normocephalic, without obvious abnormality, atraumatic. Lungs:   Clear to auscultation bilaterally. Chest wall:  No tenderness or deformity. Heart:  Regular rate and rhythm, S1, S2 normal, no murmur, click, rub or gallop. Abdomen:   Soft, non-tender. Bowel sounds normal. No masses,  No organomegaly. Extremities: Extremities normal, atraumatic, no cyanosis or edema. Pulses: 2+ and symmetric all extremities. Neurologic: CNII-XII intact. Normal strength, sensation and reflexes throughout. Current Discharge Medication List      START taking these medications    Details   brivaracetam (Briviact) 10 mg/mL oral solution Take 10 mL by mouth two (2) times a day. Max Daily Amount: 200 mg. Qty: 300 mL, Refills: 0  Start date: 5/28/2021    Associated Diagnoses: Seizure ([address]         CONTINUE these medications which have CHANGED    Details   PARoxetine (PAXIL) 20 mg tablet Take 0.5 Tablets by mouth daily. Qty: 30 Tablet, Refills: 1  Start date: 5/28/2021         CONTINUE these medications which have NOT CHANGED    Details   !! warfarin (COUMADIN) 3 mg tablet Take 3 mg by mouth every other day. 3 mg every other day alternating with 4 mg every other day      !! warfarin (COUMADIN) 4 mg tablet Take 4 mg by mouth every other day. 3 mg every other day alternating with 4 mg every other day      isosorbide mononitrate ER (IMDUR) 30 mg tablet Take 1 Tab by mouth daily for 30 days. Qty: 30 Tab, Refills: 0      albuterol (PROVENTIL HFA, VENTOLIN HFA, PROAIR HFA) 90 mcg/actuation inhaler Take 2 Puffs by inhalation every four (4) hours as needed for Wheezing. Qty: 1 Inhaler, Refills: 1      tafamidis (Vyndamax) 61 mg cap Take 61 mg by mouth daily. Qty: 30 Cap, Refills: 2    Associated Diagnoses: Cardiac amyloidosis (HCC)      magnesium oxide (MAG-OX) 400 mg tablet Take 1 Tab by mouth daily. Qty: 30 Tab, Refills: 5      budesonide-formoteroL (Symbicort) 160-4.5 mcg/actuation HFAA Take 2 Puffs by inhalation two (2) times daily as needed. !! - Potential duplicate medications found. Please discuss with provider. STOP taking these medications       bumetanide (BUMEX) 2 mg tablet Comments:   Reason for Stopping:         therapeutic multivitamin (THERAGRAN) tablet Comments:   Reason for Stopping:         cephALEXin (KEFLEX) 250 mg capsule Comments:   Reason for Stopping:         FeroSul 325 mg (65 mg iron) tablet Comments:   Reason for Stopping:         ranolazine ER (RANEXA) 500 mg SR tablet Comments:   Reason for Stopping:         hydrALAZINE (APRESOLINE) 25 mg tablet Comments:   Reason for Stopping:         insulin detemir (LEVEMIR U-100 INSULIN SC) Comments:   Reason for Stopping:         hydrOXYzine HCL (ATARAX) 10 mg tablet Comments:   Reason for Stopping:             Med rec done by RITU.  I added briviact after discussion with neurology     Total time spent on discharge planning, counseling and co-ordination of care:   35 minutes    Corrie Waters MD  05/28/21  2:13 PM

## 2021-05-28 NOTE — PROGRESS NOTES
Pharmacist Discharge Medication Reconciliation    Discharging Provider: Leni Gu    Significant PMH:   Past Medical History:   Diagnosis Date    Asthma     Cancer ([facility]     breast    Cancer ([facility]     endometrial    Congestive heart failure, unspecified     CRI (chronic renal insufficiency)     Depression     Diabetes ([facility]     Hypertension     Severe sepsis with acute organ dysfunction ([facility] 5/11/2021     Chief Complaint for this Admission:   Chief Complaint   Patient presents with    Altered mental status     Allergies: Atarax [hydroxyzine hcl] and Benzodiazepines    Discharge Medications:   Current Discharge Medication List        START taking these medications    Details   brivaracetam (Briviact) 10 mg/mL oral solution Take 10 mL by mouth two (2) times a day. Max Daily Amount: 200 mg. Qty: 600 mL, Refills: 0  Start date: 5/28/2021    Associated Diagnoses: Seizure ([facility]           CONTINUE these medications which have CHANGED    Details   PARoxetine (PAXIL) 20 mg tablet Take 0.5 Tablets by mouth daily. Qty: 30 Tablet, Refills: 1  Start date: 5/28/2021           CONTINUE these medications which have NOT CHANGED    Details   !! warfarin (COUMADIN) 3 mg tablet Take 3 mg by mouth every other day. 3 mg every other day alternating with 4 mg every other day      !! warfarin (COUMADIN) 4 mg tablet Take 4 mg by mouth every other day. 3 mg every other day alternating with 4 mg every other day      isosorbide mononitrate ER (IMDUR) 30 mg tablet Take 1 Tab by mouth daily for 30 days. Qty: 30 Tab, Refills: 0      albuterol (PROVENTIL HFA, VENTOLIN HFA, PROAIR HFA) 90 mcg/actuation inhaler Take 2 Puffs by inhalation every four (4) hours as needed for Wheezing. Qty: 1 Inhaler, Refills: 1      tafamidis (Vyndamax) 61 mg cap Take 61 mg by mouth daily. Qty: 30 Cap, Refills: 2    Associated Diagnoses: Cardiac amyloidosis (HCC)      magnesium oxide (MAG-OX) 400 mg tablet Take 1 Tab by mouth daily.   Qty: 30 Tab, Refills: 5 budesonide-formoteroL (Symbicort) 160-4.5 mcg/actuation HFAA Take 2 Puffs by inhalation two (2) times daily as needed. !! - Potential duplicate medications found. Please discuss with provider. STOP taking these medications       bumetanide (BUMEX) 2 mg tablet Comments:   Reason for Stopping:         therapeutic multivitamin (THERAGRAN) tablet Comments:   Reason for Stopping:         cephALEXin (KEFLEX) 250 mg capsule Comments:   Reason for Stopping:         FeroSul 325 mg (65 mg iron) tablet Comments:   Reason for Stopping:         ranolazine ER (RANEXA) 500 mg SR tablet Comments:   Reason for Stopping:         hydrALAZINE (APRESOLINE) 25 mg tablet Comments:   Reason for Stopping:         insulin detemir (LEVEMIR U-100 INSULIN SC) Comments:   Reason for Stopping:         hydrOXYzine HCL (ATARAX) 10 mg tablet Comments:   Reason for Stopping:               The patient's chart, MAR and AVS were reviewed by Frances Godfrey RPH.

## 2021-05-28 NOTE — PROGRESS NOTES
Noted plans for Home w/ Hospice   Signing off   Thank you for allowing us to participate in this patient's care.         [address] [facility]  Office :[phone]  Fax: [phone]

## 2021-05-28 NOTE — PROGRESS NOTES
Physical Therapy 5/28/2021    Chart reviewed up to date. Per RN, requesting to defer PT. Pt planned to d/c home with hospice. Will follow-up as appropriate. Thank you.   Simeon Chang, PT, DPT

## 2021-05-28 NOTE — PROGRESS NOTES
Noted plans for home hospice today. [facility]  requested for 1:30PM.     Spoke with Julian Warren and Mitch Bosch at length this morning - ready for home hospice today. Julian Warren is in good spirits. LVAD to go bag is in patient's room - batteries charged for transport. Transport time confirmed: 1400    [facility] updated transport time now 1445.    1505 - Called [facility] and requested update on ETA. Coordinator advised the crew assigned is at [address] [place] [address] and should leave there shortly to come  patient. CM advised patient needs to be home in [place] prior to 1600 for admission into hospice. Coordinator is looking for alternate crew to  patient, if possible. CM will continue to follow.     Shruti Hansen, MPH  Care Manager l [employer]  Available via [employer] or

## 2021-05-28 NOTE — PROGRESS NOTES
Problem: Diabetes Self-Management  Goal: *Disease process and treatment process  Description: Define diabetes and identify own type of diabetes; list 3 options for treating diabetes. Outcome: Resolved/Met  Goal: *Incorporating nutritional management into lifestyle  Description: Describe effect of type, amount and timing of food on blood glucose; list 3 methods for planning meals. Outcome: Resolved/Met  Goal: *Incorporating physical activity into lifestyle  Description: State effect of exercise on blood glucose levels. Outcome: Resolved/Met  Goal: *Developing strategies to promote health/change behavior  Description: Define the ABC's of diabetes; identify appropriate screenings, schedule and personal plan for screenings. Outcome: Resolved/Met  Goal: *Using medications safely  Description: State effect of diabetes medications on diabetes; name diabetes medication taking, action and side effects. Outcome: Resolved/Met  Goal: *Monitoring blood glucose, interpreting and using results  Description: Identify recommended blood glucose targets  and personal targets. Outcome: Resolved/Met  Goal: *Prevention, detection, treatment of acute complications  Description: List symptoms of hyper- and hypoglycemia; describe how to treat low blood sugar and actions for lowering  high blood glucose level. Outcome: Resolved/Met  Goal: *Prevention, detection and treatment of chronic complications  Description: Define the natural course of diabetes and describe the relationship of blood glucose levels to long term complications of diabetes.   Outcome: Resolved/Met  Goal: *Developing strategies to address psychosocial issues  Description: Describe feelings about living with diabetes; identify support needed and support network  Outcome: Resolved/Met  Goal: *Insulin pump training  Outcome: Resolved/Met  Goal: *Sick day guidelines  Outcome: Resolved/Met  Goal: *Patient Specific Goal (EDIT GOAL, INSERT TEXT)  Outcome: Resolved/Met Problem: Patient Education: Go to Patient Education Activity  Goal: Patient/Family Education  Outcome: Resolved/Met     Problem: Pressure Injury - Risk of  Goal: *Prevention of pressure injury  Description: Document Jaime Scale and appropriate interventions in the flowsheet. Outcome: Resolved/Met     Problem: Patient Education: Go to Patient Education Activity  Goal: Patient/Family Education  Outcome: Resolved/Met     Problem: Falls - Risk of  Goal: *Absence of Falls  Description: Document Godfrey Fall Risk and appropriate interventions in the flowsheet.   Outcome: Resolved/Met     Problem: Patient Education: Go to Patient Education Activity  Goal: Patient/Family Education  Outcome: Resolved/Met     Problem: Breathing Pattern - Ineffective  Goal: *Use of effective breathing techniques  Outcome: Resolved/Met  Goal: *PALLIATIVE CARE:  Alleviation of Dyspnea  Outcome: Resolved/Met     Problem: Patient Education: Go to Patient Education Activity  Goal: Patient/Family Education  Outcome: Resolved/Met     Problem: Nutrition Deficit  Goal: *Optimize nutritional status  Outcome: Resolved/Met     Problem: Patient Education: Go to Patient Education Activity  Goal: Patient/Family Education  Outcome: Resolved/Met     Problem: Patient Education: Go to Patient Education Activity  Goal: Patient/Family Education  Outcome: Resolved/Met     Problem: High Risk or History of JED  Goal: Recognition of JED or High Risk for JED  Outcome: Resolved/Met  Goal: Maintain Patent Airway and Adequate Oxygenation  Outcome: Resolved/Met  Goal: Avoid Over-sedation  Outcome: Resolved/Met  Goal: Maintenance Care of JED  Outcome: Resolved/Met     Problem: Patient Education: Go to Patient Education Activity  Goal: Patient/Family Education  Outcome: Resolved/Met

## 2021-05-28 NOTE — PROGRESS NOTES
0800: Bedside and Verbal shift change report given to [address] RN (oncoming nurse) by Alicia Villalobos (offgoing nurse). Report included the following information SBAR and Cardiac Rhythm V Paced. 1100: Bedside and Verbal shift change report given to Duane Kilts (oncoming nurse) by Escobar Stinson (offgoing nurse). Report included the following information SBAR and Cardiac Rhythm V Paced.

## 2021-05-28 NOTE — HOSPICE
Hospice Liaison Note:    Chart reviewed for updates in plan of care. Patient seems stable. She is alert and states that she feels \"marvelous\" and is looking forward to going home today. Called patient daughter, Suzanne Homans, to review plans for pt discharge home today. DME has not arrived in the home. DME confirmed for delivery by noon today. Melinda updated. , Guido Mckenna, to arrange transportation home around 1:30-2:00pm.    Hospice Social Worker, Aldo Allan LCSW to have hospice consents signed this am.    Please call Hospice team to offer support for patient, family or staff. 11:06: Hospice consents signed and scanned to hospice intake. DDNR signed by family and on hard chart for MD signature prior to discharge. [employer] called and plan to come to [facility] [place] to deactivate AICD prior to discharge home. 13:00: AICD has been deactivated. DDNR signed by Dr. Mainor Nicole. Original given to patient's daughter. Symptom kit medications, including Morphine liquid 30ml, handed to daughter with instructions to give to admission nurse this afternoon.     Thank you for your coordination with the hospice plan of care      Faby Ann RN, [place] [address] Nurse Liaison  [phone] [place]  [phone] Office

## 2021-05-29 NOTE — PROGRESS NOTES
[facility] VAD/Heart Failure Progress Note    Admit Date: 5/11/2021  POD:  * No surgery found *    Procedure:          Subjective:   Continues to improve; home with hospice / palliative care soon; good flows     Objective:   Vitals:  Blood pressure (!) 130/93, pulse 99, temperature 98.9 °F (37.2 °C), resp. rate 28, height 5' 7\" (1.702 m), weight 223 lb 4.8 oz (101.3 kg), SpO2 100 %. No data recorded.     Hemodynamics:   CO:     CI:     CVP: CVP (mmHg): 10 mmHg (05/16/21 0700)   SVR:     PAP Systolic:     PAP Diastolic:     PVR:     EH59:     SCV02:      VAD Interrogation: LVAD (Heartmate)  Pump Speed (RPM): 9400  Pump Flow (LPM): 5  Chatter in Lines: No  PI (Pulsitility Index): 6.5  Power: 5.7  MAP: 105   Test: No  Back Up  at Bedside & Labeled: Yes  Power Module Test: No  Driveline Site Care: No  Driveline Dressing: Clean, Dry, and Intact    EKG/Rhythm:      Extubation Date / Time:     CT Output:     Ventilator:  Ventilator Volumes  Vt Set (ml): 450 ml (05/13/21 0750)  Vt Exhaled (Machine Breath) (ml): 473 ml (05/13/21 0750)  Vt Spont (ml): 520 ml (05/22/21 0235)  Ve Observed (l/min): 10.1 l/min (05/22/21 0235)    Oxygen Therapy:  Oxygen Therapy  O2 Sat (%): 100 % (05/28/21 1200)  Pulse via Oximetry: 94 beats per minute (05/28/21 0736)  O2 Device: None (Room air) (05/28/21 1200)  Skin Assessment: Clean, dry, & intact (05/27/21 0800)  Skin Protection for O2 Device: No (05/26/21 1600)  O2 Flow Rate (L/min): 2 l/min (05/27/21 0740)  O2 Temperature:  (HME) (05/13/21 0328)  FIO2 (%): 21 % (05/28/21 0736)  ETCO2 (mmHg): 36 mmHg (05/11/21 0200)    CXR:    Admission Weight: Last Weight   Weight: 269 lb 10 oz (122.3 kg) Weight: 223 lb 4.8 oz (101.3 kg)     Intake / Output / Drain:  Current Shift: 05/29 0701 - 05/29 1900  In: 240 [P.O.:240]  Out: -   Last 24 hrs.: No intake or output data in the 24 hours ending 05/29/21 1813        No results for input(s): CPK, CKMB, TROIQ in the last 72 hours. Recent Labs     05/27/21  0356      K 3.2*   CO2 22   BUN 23*   CREA 4.11*   GLU 89   PHOS 3.7   MG 2.5*   WBC 3.6   HGB 8.0*   HCT 27.0*   *     Recent Labs     05/27/21  0356   INR 2.0*   PTP 20.3*     No lab exists for component: PBNP      No current facility-administered medications for this encounter. Current Outpatient Medications:     PARoxetine (PAXIL) 20 mg tablet, Take 0.5 Tablets by mouth daily. , Disp: 30 Tablet, Rfl: 1    brivaracetam (Briviact) 10 mg/mL oral solution, Take 10 mL by mouth two (2) times a day. Max Daily Amount: 200 mg., Disp: 600 mL, Rfl: 0    levETIRAcetam (KEPPRA) 500 mg tablet, Take 500 mg by mouth two (2) times a day., Disp: , Rfl:     ranolazine ER (RANEXA) 500 mg SR tablet, Take 1,000 mg by mouth two (2) times a day., Disp: , Rfl:     morphine (ROXANOL) 100 mg/5 mL (20 mg/mL) concentrated solution, Take 5 mg by mouth every three (3) hours as needed for Shortness of Breath., Disp: , Rfl:     haloperidol (HALDOL) 2 mg/mL oral concentrate, Take 1 mg by mouth every six (6) hours as needed for Agitation. , Disp: , Rfl:     prochlorperazine (COMPAZINE) 10 mg tablet, Take 10 mg by mouth every six (6) hours as needed for Nausea or Vomiting., Disp: , Rfl:     acetaminophen (TYLENOL) 650 mg suppository, Insert 650 mg into rectum every six (6) hours as needed for Fever., Disp: , Rfl:     bisacodyL (DULCOLAX) 10 mg supp, Insert 10 mg into rectum daily as needed for Constipation. , Disp: , Rfl:     hyoscyamine SL (LEVSIN/SL) 0.125 mg SL tablet, Take 0.125 mg by mouth every four (4) hours as needed for Secretions. , Disp: , Rfl:     budesonide-formoteroL (SYMBICORT) 160-4.5 mcg/actuation HFAA, Take 2 Puffs by inhalation two (2) times daily as needed (shortness of breath). , Disp: , Rfl:     warfarin (COUMADIN) 3 mg tablet, Take 3 mg by mouth every other day.  3 mg every other day alternating with 4 mg every other day, Disp: , Rfl:     warfarin (COUMADIN) 4 mg tablet, Take 4 mg by mouth every other day. 3 mg every other day alternating with 4 mg every other day, Disp: , Rfl:     tafamidis 61 mg cap, Take 61 mg by mouth daily. , Disp: , Rfl:     magnesium oxide (MAG-OX) 400 mg tablet, Take 400 mg by mouth daily. , Disp: , Rfl:     isosorbide mononitrate ER (IMDUR) 30 mg tablet, Take 30 mg by mouth daily. , Disp: , Rfl:     albuterol (PROVENTIL HFA, VENTOLIN HFA, PROAIR HFA) 90 mcg/actuation inhaler, Take 2 Puffs by inhalation every four (4) hours as needed for Wheezing., Disp: , Rfl:     hydrALAZINE (APRESOLINE) 25 mg tablet, Take 100 mg by mouth three (3) times daily. MAP >80, Disp: , Rfl:     OXYGEN-AIR DELIVERY SYSTEMS, 2 L by Nasal route as needed (dyspnea). , Disp: , Rfl:     isosorbide mononitrate ER (IMDUR) 30 mg tablet, Take 1 Tab by mouth daily for 30 days. (Patient not taking: Reported on 5/28/2021), Disp: 30 Tab, Rfl: 0    albuterol (PROVENTIL HFA, VENTOLIN HFA, PROAIR HFA) 90 mcg/actuation inhaler, Take 2 Puffs by inhalation every four (4) hours as needed for Wheezing. (Patient not taking: Reported on 5/28/2021), Disp: 1 Inhaler, Rfl: 1    tafamidis (Vyndamax) 61 mg cap, Take 61 mg by mouth daily. (Patient not taking: Reported on 5/28/2021), Disp: 30 Cap, Rfl: 2    magnesium oxide (MAG-OX) 400 mg tablet, Take 1 Tab by mouth daily.  (Patient not taking: Reported on 5/28/2021), Disp: 30 Tab, Rfl: 5       A/P  S/P LVAD - good flows  Renal failure - HD  Urosepsis - Abx's  Need for anticoagulation - coumadin      Risk of morbidity and mortality - high  Medical decision making - high complexity    Signed By: Robert Jaffe MD

## 2021-06-16 NOTE — TELEPHONE ENCOUNTER
Contacted patient's daughter to check in (PHI release auth verified). Per Melinda patient has been doing well at home. She stated she has been up walking and doing things around the house. She reported hospice placed referral for home PT as they felt it would be beneficial. She stated they are currently at the lab getting lab work as hospice providers are curious about renal function as patient has been doing so well. Madeleine Hurtado had no questions or concerns at this time. Encouraged her to contact LVAD team if questions arise. Provider updated. Roberth Frost RN.

## 2021-06-18 NOTE — TELEPHONE ENCOUNTER
Gavin Arceo NP  You Yesterday (8:34 AM)   EP  Lets do a virtual visit at least please if Mitch Bosch agrees    Message text      Attempted to contact patient's daughter. Message left. Will await return call. Vesta Jama RN.

## 2021-06-18 NOTE — TELEPHONE ENCOUNTER
Patient's daughter returned call. She is agreeable to scheduling virtual visit. She had no further questions. Transferred to TIFFANY Zuluaga for schedulng. Rich Gutierrez RN.

## 2021-07-01 NOTE — PROGRESS NOTES
Patient actively a bundle patient that was discharge home with hospice through [facility]. LPN Care Coordinator will follow up with the hospice company every 30 days for the next 90 days.

## 2021-07-02 NOTE — PROGRESS NOTES
[address] Note      Patient name: Farrukh Smith  Patient : 1950  Patient MRN: [id number]  Consulting MD: No att. providers found    Date of service: 21    Chief Complaint   Patient presents with    Follow-up     VAD          PLAN:  Remains under hospice care at this time   No VAD alarms or issues  Medication management per Dr. 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 Randa King)  · C/b delayed extubation due to severe COPD  · C/b critical illness polyneuropathy  · C/b prolonged hospitalization post-LVAD, 55 days  · C/b sternal wound infection s/p debridement (by Dr. Lona Saint) s/p wound vac  · C/b sacral ulcer  · Would culture positive for Staph aureus, not MRSA  · C/b LVAD site drainage, improved  S/p CRT-ICD  · ICD fired due to electrolyte imbalance ()  H/o breast cancer ()   · s/p bilateral mastectomy/chemo and endometrial cancer s/p hysterectomy  · Lymphedema of LLE due to cancer treatment  Severe COPD with FEV1 50%  Depression  Atrial fibrillation  H/o \"two mini strokes\"  S/p fall with hip facture   · Right hip hemmiarthroplasty (18) by Dr. Maurice Barron)  · S/p removed hip hardwarare due to pain (4/15/19)  COPD severe  CKD, stage 4  Pulmonary hypertension  Cardiac risk factors:  · Morbid obesity, Body mass index is 42.29 kg/m².   · DM2 insulin dependent  · JED on Trilogy  · HL  Urinary incontinence, severe  · no procedures due to anticoagulation  Endometrial cancer (2002)  HTN  HL  DNR/DNI        CARDIAC IMAGING:  TTE 5/11/21 LVEF < 15%, LVIDd 5.96cm, TAPSE 0.99cm, RVIDd 4.14cm  TTE 4/19 shows improved LVIDd, 6.68 cm with RVIDd 5.14 cm and TAPSE 1.1 cm   TTE 4/28/21 LVIDd 7.37cm, RVIDd 5.27cm, TAPSE 1.44cm   Echo (9/24/19) LVEF 20-25%, AV opens 1:1, no AR  Echo (5/29/18) LVEF 10-%, ramps study done, report in [facility]  Echo (1/9/18) ramp study done, LVEDD 7.1cm  LHC (11/9/18) 2 vessel disease with 90% OM, 80% PDA, DSA to PDA branch  RHC 4/20 showed adequate Sal CI 3.0 but severely elevated RA pressure 24 mmHg  CXR negative 4/27/21     INTERVAL HISTORY:  Ms. Kanu Jones is [age] y.o.  Female s/p LVAD implant with HM 2 and ESRD requiring HD that was recently admitted for RV failure and worsening renal function. She was discharged on 5/5/21 to [facility] for rehabilitation before transitioning home to live her with daughter. In the last few days she had worsening mental status changed, decreased UOP and respiratory distress after possible aspiration- she was intubated and admitted to [facility] for further management. It appears patient condition was triggered by candida UTI and likely intolerance of BIPAP - inadequately supporting ventilation in rehab - patient is dependent on Trilogy support. LVAD INTERROGATION:  Device interrogated in person  Device function normal, normal flow, no events          PHYSICAL EXAM:  There were no vitals taken for this visit. No flowsheet data found. Limited PE due to virtual visit   Physical Exam  Constitutional:       Appearance: Normal appearance. Pulmonary:      Effort: Pulmonary effort is normal.   Neurological:      General: No focal deficit present. Mental Status: She is alert and oriented to person, place, and time.    Psychiatric:         Mood and Affect: Mood normal.         REVIEW OF SYSTEMS:  Review of Systems Constitutional: Positive for malaise/fatigue. Negative for chills and fever. HENT: Negative. Eyes: Negative. Respiratory: Negative for cough, sputum production and shortness of breath. Cardiovascular: Positive for leg swelling. Negative for chest pain and palpitations. Chronic edema   Gastrointestinal: Positive for diarrhea. Negative for heartburn. Improving    Genitourinary: Negative. Musculoskeletal: Negative. Negative for falls. Skin:        Some weeping, sacral decub improving    Neurological: Negative for dizziness and headaches. Endo/Heme/Allergies: Negative. Psychiatric/Behavioral: Negative. PAST MEDICAL HISTORY:  Past Medical History:   Diagnosis Date    Asthma     Cancer [facility]     breast    Cancer ([facility]     endometrial    Congestive heart failure, unspecified     CRI (chronic renal insufficiency)     Depression     Diabetes ([facility]     Hypertension     [facility] 5/11/2021       PAST SURGICAL HISTORY:  Past Surgical History:   Procedure Laterality Date    HX HERNIA REPAIR      HX HYSTERECTOMY      HX MASTECTOMY      IR INSERT NON TUNL CVC OVER 5 YRS  4/26/2021    IR INSERT TUNL CVC W/O PORT OVER 5 YR  5/5/2021       FAMILY HISTORY:  No family history on file.     SOCIAL HISTORY:  Social History     Socioeconomic History    Marital status:      Spouse name: Not on file    Number of children: Not on file    Years of education: Not on file    Highest education level: Not on file   Tobacco Use    Smoking status: Never Smoker    Smokeless tobacco: Never Used   Substance and Sexual Activity    Alcohol use: Not Currently     Social Determinants of Health     Financial Resource Strain:     Difficulty of Paying Living Expenses:    Food Insecurity:     Worried About Running Out of Food in the Last Year:     [address] in the Last Year:    Transportation Needs:     Lack of Transportation (Medical):  Lack of Transportation (Non-Medical):    Physical Activity:     Days of Exercise per Week:     Minutes of Exercise per Session:    Stress:     Feeling of Stress :    Social Connections:     Frequency of Communication with Friends and Family:     Frequency of Social Gatherings with Friends and Family:     Attends Denominational Services:     Active Member of Clubs or Organizations:     Attends Club or Organization Meetings:     Marital Status:        LABORATORY RESULTS:     Labs Latest Ref Rng & Units 5/27/2021 5/26/2021 5/25/2021 5/25/2021 5/24/2021 5/23/2021 5/22/2021   WBC 3.6 - 11.0 K/uL 3.6 2.8(L) - 4.7 4.6 4.2 4.2   RBC 3.80 - 5.20 M/uL 2.87(L) 2.98(L) - 3.12(L) 3.22(L) 3.26(L) 2.97(L)   Hemoglobin 11.5 - 16.0 g/dL 8. 0(L) 8. 3(L) - 8. 6(L) 8.7(L) 8. 9(L) 8. 1(L)   Hematocrit 35.0 - 47.0 % 27. 0(L) 28. 1(L) - 28. 7(L) 29. 7(L) 29. 8(L) 27. 7(L)   MCV 80.0 - 99.0 FL 94.1 94.3 - 92.0 92.2 91.4 93.3   Platelets 116 - 794 K/uL 138(L) 132(L) - 130(L) 129(L) 139(L) 133(L)   Lymphocytes 12 - 49 % 17 22 - 11(L) 11(L) 10(L) 11(L)   Monocytes 5 - 13 % 11 11 - 9 9 8 8   Eosinophils 0 - 7 % 0 0 - 0 0 0 0   Basophils 0 - 1 % 1 1 - 1 1 1 1   Bands 0 - 6 % - - - - - - -   Albumin 3.5 - 5.0 g/dL 3. 0(L) 3. 1(L) 3. 3(L) 3. 2(L) 3.0(L) 3.0(L) 2. 9(L)   Calcium 8.5 - 10.1 MG/DL 9.0 9.4 8.7 9.2 8.9 8.8 9.0   Glucose 65 - 100 mg/dL 89 134(H) 123(H) 121(H) 119(H) 112(H) 125(H)   BUN 6 - 20 MG/DL 23(H) 16 10 27(H) 22(H) 14 28(H)   Creatinine 0.55 - 1.02 MG/DL 4.11(H) 3.48(H) 2.27(H) 5.11(H) 4.15(H) 3.14(H) 4.97(H)   Sodium 136 - 145 mmol/L 139 139 137 137 134(L) 136 135(L)   Potassium 3.5 - 5.1 mmol/L 3.2(L) 3.9 3.4(L) 3. 3(L) 3.6 3.8 3.9   TSH 0.36 - 3.74 uIU/mL - - - - - - -   LDH 81 - 246 U/L 174 195 - 197 194 210 192   Some recent data might be hidden     Lab Results   Component Value Date/Time    TSH 1.60 05/15/2021 03:14 AM    TSH 5.36 (H) 04/24/2021 04:32 AM    TSH 3.99 (H) 04/17/2021 04:54 AM    TSH 2.980 10/01/2020 12:00 AM ALLERGY:  Allergies   Allergen Reactions    Atarax [Hydroxyzine Hcl] Other (comments)     Small doses tolerated. Full dose 10mg causes oversedation.  Benzodiazepines Other (comments)     Respiratory issues        CURRENT MEDICATIONS:    Current Outpatient Medications:     hydrOXYzine HCL (ATARAX) 10 mg tablet, Take 2.5 mg by mouth every six (6) hours as needed for Itching., Disp: , Rfl:     neomycin-bacitracin-polymyxin (Triple Antibiotic) 3.5mg-400 unit- 5,000 unit/gram ointment, Apply 1 UNSPECIFIED to affected area daily. Apply a thin layer to wound when changing dressing daily, Disp: 28, Rfl: 6    levETIRAcetam (KEPPRA) 500 mg tablet, Take 250 mg by mouth two (2) times a day. Take half a tablet two times a day  Indications: seizures, Disp: , Rfl:     bumetanide (BUMEX) 1 mg tablet, Take 1 mg by mouth daily as needed for Other (fluid retention, edema). , Disp: , Rfl:     HYDROcodone-acetaminophen (NORCO) 5-325 mg per tablet, Take 1 Tablet by mouth every four (4) hours as needed for Pain., Disp: 60 Tablet, Rfl: 4    simethicone (MYLICON) 40 AC/2.3 mL drops, Take 40 mg by mouth Before breakfast, lunch, dinner and at bedtime. Indications: gas, Disp: 30 mL, Rfl: 4    loperamide (IMODIUM) 1 mg/5 mL solution, Take 2 mg by mouth four (4) times daily as needed for Diarrhea., Disp: 60 mL, Rfl: 4    PARoxetine (PAXIL) 20 mg tablet, Take 0.5 Tablets by mouth daily. (Patient taking differently: Take 20 mg by mouth daily.), Disp: 30 Tablet, Rfl: 1    acetaminophen (TYLENOL) 650 mg suppository, Insert 650 mg into rectum every six (6) hours as needed for Fever., Disp: , Rfl:     budesonide-formoteroL (SYMBICORT) 160-4.5 mcg/actuation HFAA, Take 2 Puffs by inhalation two (2) times a day., Disp: , Rfl:     warfarin (COUMADIN) 3 mg tablet, Take 3 mg by mouth every other day.  3 mg every other day alternating with 4 mg every other day, Disp: , Rfl:     warfarin (COUMADIN) 4 mg tablet, Take 4 mg by mouth every other day. 3 mg every other day alternating with 4 mg every other day, Disp: , Rfl:     tafamidis 61 mg cap, Take 61 mg by mouth daily. , Disp: , Rfl:     isosorbide mononitrate ER (IMDUR) 30 mg tablet, Take 30 mg by mouth daily. , Disp: , Rfl:     albuterol (PROVENTIL HFA, VENTOLIN HFA, PROAIR HFA) 90 mcg/actuation inhaler, Take 2 Puffs by inhalation every four (4) hours as needed for Wheezing., Disp: , Rfl:     hydrALAZINE (APRESOLINE) 25 mg tablet, Take 100 mg by mouth three (3) times daily. MAP >80, Disp: , Rfl:     traZODone (DESYREL) 50 mg tablet, Take 25 mg by mouth nightly. Give 1/2 (half) tablet by mouth at bedtime  Indications: insomnia and anxiety (Patient not taking: Reported on 7/2/2021), Disp: , Rfl:     OXYGEN-AIR DELIVERY SYSTEMS, 2 L by Nasal route as needed (Comfort). (Patient not taking: Reported on 7/2/2021), Disp: , Rfl:     morphine (ROXANOL) 100 mg/5 mL (20 mg/mL) concentrated solution, Take 5 mg by mouth every three (3) hours as needed for Shortness of Breath (Unrelieved by Symbicort). (Patient not taking: Reported on 7/2/2021), Disp: , Rfl:     brivaracetam (Briviact) 10 mg/mL oral solution, Take 10 mL by mouth two (2) times a day. Max Daily Amount: 200 mg. (Patient not taking: Reported on 7/2/2021), Disp: 600 mL, Rfl: 0    haloperidol (HALDOL) 2 mg/mL oral concentrate, Take 1 mg by mouth every six (6) hours as needed for Agitation. (Patient not taking: Reported on 7/2/2021), Disp: , Rfl:     prochlorperazine (COMPAZINE) 10 mg tablet, Take 10 mg by mouth every six (6) hours as needed for Nausea or Vomiting. (Patient not taking: Reported on 7/2/2021), Disp: , Rfl:     hyoscyamine SL (LEVSIN/SL) 0.125 mg SL tablet, Take 0.125 mg by mouth every four (4) hours as needed for Secretions. (Patient not taking: Reported on 7/2/2021), Disp: , Rfl:     magnesium oxide (MAG-OX) 400 mg tablet, Take 400 mg by mouth daily.  (Patient not taking: Reported on 7/2/2021), Disp: , Rfl:     PATIENT CARE TEAM:  Patient Care Team:  Colin Le NP as PCP - General (Nurse Practitioner)  Ilah Merlin, MD as PCP - Hospice Attending  David Dwyer MD (Cardiology)  Mac Wynn MD as Physician (Cardiology)  Demond Campos LPN as Care Coordinator  Stefan Pierce MD (Cardiothoracic Surgery)  Loni Metz MD (Cardiology)     Thank you for allowing me to participate in this patient's care.     Prakash Burrell NP  [address] [place] [address], [address]  Phone: [phone]

## 2021-07-09 NOTE — TELEPHONE ENCOUNTER
Message received from Dr. Lisa Brooks, [facility] Director, requesting return call regarding patient. Returned call to Dr. Lisa Brooks. He stated patient was originally put on service for end stage renal disease. He reported patient's creatinine has improved to, \"better that it's been for months. \" He stated at this time end stage renal disease can not be a justifiable diagnosis for hospice coverage, and he is unsure of any other coverable diagnosis for patient currently. He stated patient and family did appreciate the extra assistance hospice provider, but at this time she has improved. He also reported they have deprescribed some of the hospice specific medications due to lack of need. Informed him will discuss with providers at medical review board meeting today and will contact him with further recommendations. Informed him will also discuss possibility of restarting home health if recommendations are to discontinue hospice. He verbalized understanding and had no further questions at this time. Марина Quarles RN.

## 2021-07-09 NOTE — TELEPHONE ENCOUNTER
Patient reviewed at [facility]. Per providers Jodi Jones NP, Bryant Drake NP, Dr. Sylwia Waldron) okay to discharge from hospice at this time due to lack of need and improved patient condition. Home Health reordered per Dr. Krzysztof Knowles. TEA Waletr V.O.RSETH Gould, JUMA to assist in setting up. Will contact patient and daughter Monday to discuss. Next visit to be in clinic, not virtual. Will discuss Monday. Viri Rosa RN.

## 2021-07-12 NOTE — TELEPHONE ENCOUNTER
Message received via secure email from patient's daughter inquiring about plan of care. Contacted patient's daughter Edson Parikh, [facility] release auth verified. Informed her would like to reestablish care with in office clinic visit and home health has been reordered. Edson Parikh stated that patient has remained at home in [facility], and plans to stay there. She requested services with [facility] be restarted as patient was managed by [facility] previously. She requested nurse Jesus Anand be reassigned to patient from [facility] if at all possible. Edson Parikh stated they are very open for restarting in office clinic visits. She did inquire about orders for a medical bed. She stated patient has been using the bed provided by Hospice which has greatly improved her sleep. Informed her unsure if order can be provided by this office. Informed her order may need to come from PCP, but will review with providers and notify her of recommendations. Informed Edson Parikh will have scheduling staff contact her to schedule patient for visit within the next few weeks. Melinda verbalized understanding and had no further questions at this time. Judy Lopez RN.

## 2021-07-14 NOTE — PROGRESS NOTES
Patients improved health condition and need or lack of need for hospice was discussed during [place]. [facility] providers agreeable to patients discharge from hospice at this time due to lack of need and patients improved condition. At request of [facility] Team, TIMOTHY has started to coordinate re-establishing home health with [address] [place] [address] at the request of the patient and her daughter Vj Nix. Patient/Melinda has requested they have the same nurse, Italo Al that has provided care for the patient in the past. SW sent the referral for SN, PT/OT, and LVAD to [address] [place] [address] requesting [facility], they accepted the patient and noted patient/familys request for [facility], for a start of care on 7/19/21 or before. At [facility] request, TIMOTHY has sent a referral to [facility] () with [facility] for a hospital bed. Nallelyne Boyd stated patient has been using the bed provided by Hospice which has greatly improved her sleep; however, the patient cannot keep the bed provided through hospice if she is no longer receiving hospice services. SW awaiting confirmation from [facility] to set up delivery of a new bed to patient's home. TIMOTHY contacted [facility], and spoke with Wood Bacon, patient will graduate form hospice services with a discharge date of 7/17/21. TIMOTHY was asked by Vj Nix to assist with getting patients Trilogy machine covered by insurance, it is not covered while in hospice care. TIMOTHY contacted [facility] with [employer]. ([phone]), she will fax a document/order to be completed with Trilogy settings and a providers signature for patient need. Vj Nix also requested assistance with a bill she received from [facility] from the patients last discharge. Vj Nix will contact [facility] first to see if she can get the issue taken care of if not she will contact this SW. Patient has been scheduled for 7/22/21 at 10:00 am in the [facility] for an in-person visit.     7/15/21 SW has received a form from [employer].  to  (re)start coverage for patient's Trilogy. This document has been provided to [facility] NP for completion on 7/14/21. TIMOTHY spoke with Nick Anderson today, she informed SW that Clemente Thomas contacted the patient today and they notified patient they will be coming to home 7/19/21 to get settings. SW will follow up with [facility]. And [facility] NP on status of form for insurance coverage for Trilogy. SW spoke with [facility], they have received the order/referral for hospital bed for the patient. They need the copayment, upgrade fee (for full electric bed) and a credit card on file for the bed to schedule a date for delivery. TIMOTHY contacted Nickboyd Anderson a notified her what [facility] needs for delivery of bed. Nick Anderson stated she will contact Alonzo Condon today and provide previous and will reach out to [address] [place] [address] to coordinate  of Hospice bed and delivery of [facility] bed. TIMOTHY received a voicemail message from Nick Anderson stating she had spoken with [facility] and they will deliver the bed 7/16/21 pm if Hospice can  their bed before 7/17/21 if not [facility] will reschedule for deliver on Saturday. Nick Anderson also stated the monthly out of pocket cost for the full electric hospital bed is $20.     TIMOTHY continues to follow for out patient needs.

## 2021-07-15 NOTE — TELEPHONE ENCOUNTER
Spoke with JUMA Braga who is setting patient up for home health, and preparing orders per Laura Colon NP for medical bed. Patient scheduled for clinic follow up 8/3/21. RADHA Castillo RN.

## 2021-07-19 NOTE — PROGRESS NOTES
TIMOTHY spoke with [facility] with [address] [place] with [address] has not completed her home visit with the patient. [facility] will reach out to TIMOTHY with most current settings once Ximena's visit is complete.  Once setting information is provided to TIMOTHY, a new order to continue Trilogy will be completed and faxed to [facility] will continue to follow

## 2021-07-20 NOTE — TELEPHONE ENCOUNTER
Panda Lei, NP  You 1 hour ago (12:25 PM)   EP  No electrolytes, her potassium is already 4.5, she needs to stay hydrated, no changes to medications until appt.  Repeat labs on Thursday     Message text      Contacted patient's daughter to notify. She verbalized understanding and had no further questions. Gabino Ramos RN.

## 2021-07-20 NOTE — TELEPHONE ENCOUNTER
Roya Wang, physical therapist with [facility] called to let us know she saw patient and they will be seeing patient twice a week for the next 3-4 weeks.     If you have questions, you an call her back at [phone]

## 2021-07-20 NOTE — TELEPHONE ENCOUNTER
Call received from patient's daughter who reported patient has \"just not been her self,\" last couple of days. She stated Saturday patient was sick with \"tons of diarrhea,\" and she is concerned she may be dehydrated. She reported her O2 sats have been, \"in the high 80's and we've been trying to get it above 90.\" She reported pt Trilogy machine was set up yesterday and she was able to get more sleep last night. She stated MAP has been 80's-90's, but HR on pulse ox with HH yesterday was high. She reported patient has developed a cough that is \"crackly,\" and was told by [facility] nurse yesterday lungs slightly diminished at bases, but upper lungs clear. Per Nick Anderson pt has not been eating or drinking well, has been very tired and lethargic, and reported PI in the \"low 4's. \" She denied any LVAD alarms. Wt today 233lbs, but has not been able to weigh in quite some time, so no previous wts for comparison. She also reported swelling in the ankles, but denied any increase shortness of breath. Medication reconciliation completed. Of note patient on bumex 1mg daily, Imdur 30mg daily, tafamadis 61mg daily, and hydralazine 100mg TID. Nick Anderson inquired if patient may drink electrolyte solution and if any changes should be made prior to appointment with [facility] Thursday. Labs drawn by [facility] 7/19/21 pending. Informed her will review with provider and will contact her with further recommendations. She verbalized understanding and had no further questions. Attempted to [facility] to inquire about visit yesterday and check status of labs. Message left. Will await return call. Kirti Peng RN.

## 2021-07-20 NOTE — PROGRESS NOTES
TIMOTHY received most recent Trilogy setting information (obtained 7/19/21 by Fadumo Ballard) from [place] with [address] completed referral for [employer]. to continue patient's home Trilogy machine, it was faxed back to [address] [place] [address] and a confirmation was received. TIMOTHY spoke with Shasha Henry, patient's daughter to inform her that per [address] [place] [address] the patient has been non-compliant with Trilogy usage. [employer] advises the patient will have 30 days to improve compliance, schedule  An appointment in 5 days from 7/19/21 and another appointment in 30 days. Melinda verbalized understandin and shared the patient had not been compliant because she needed a new mask which she received 7/19/21 and she wore the new mask last night and use the Trilogy for 8 hours. Shasha Henry reported hospice bed has been removed and hospital bed from [facility] has been installed in patient's home. TIMOTHY will follow up with [address] [place] [address] for Trilogy referral status.

## 2021-07-20 NOTE — PATIENT INSTRUCTIONS
Medication changes: You can start to take mucinex (guafenesin) 1200mg twice per day. Please take this to your pharmacy to notify them of the change in medications. Testing Ordered:    Lab work has been drawn today. You will be contacted with any abnormal results requiring changes to your current plan of care. Other Recommendations:     Please ensure to call your INR results in to [employer]    Continue to change drive line exit site dressing 3 times a week using sterile technique,     Ensure you are drinking an adequate amount of water with a goal of 6-8 eight ounce glasses (1.5-2 liters) of fluid daily. Your urine should be clear and light yellow straw colored. Follow up in 1 month with Beatriz Rosas      For further patient and caregiver resources as well as access to online LVAD support groups visit [email or web address]       Please bring your daily sheets and medications to your next appointment. Please monitor your weights daily upon waking and after using the bathroom. Keep a written records of your weights and bring to your next appointment. If you have a weight gain of 3 or more pounds overnight OR 5 or more pounds in one week please contact our office. Thank you for allowing us the privilege of being a part of your healthcare team! Please do not hesitate to contact our office at [phone] with any questions or concerns. Virtual Heart Failure [address] invites you to learn more about heart failure and to share your questions, ideas, and experiences with others. Each month, the Heart Failure Support Group features a new educational topic and a guest speaker, followed by an interactive discussion. Our Heart Failure Nurse Navigator will moderate each session. You will be able to participate by phone, tablet or computer through [address] [place] [address].  This support group takes place on the 3rd Thursday of each month from 6:00-7:30PM. All individuals living with heart failure and their caregivers are welcome to join. If you are interested in participating, please contact us at [email or web address] and you will be sent the link to join the [place]. LVAD Daily Maintenance    For healthy living with your LVAD, youll need to make sure that:     Your equipment is working properly  [employer] have sufficient power sources at all times   Your driveline exit site is clean and dry  Sharlon Masker following a healthy diet   Youre exercising regularly, as youre able to tolerate it  Sharlon Masker taking your medications and supplements as directed by your doctor.  Your LVAD team will give you detailed instructions on what you need to do on a daily basis for your specific device and medical condition. They will also most likely give you a chart (sometimes called a flowsheet) customized for your specific device, to fill out daily. Flowsheets make it easy to keep track of your weight, medications, device settings (such as pump speed, power, etc.) and other daily maintenance items. General Daily Checklist   Check your Driveline and Driveline Exit Site   Make sure your driveline is not twisted or kinked, and is secured close to your body.  Check the driveline regularly for signs of damage (cuts, holes, tears). If you see any damage, let your LVAD team know immediately. Do not try to repair it yourself.  Change your exit site dressing as instructed by your LVAD Team. When examining the exit site, make sure its clean and dry.  Notify your LVAD Team immediately if you see any signs of injury, trauma or infection. Signs of infection may include redness, drainage, tenderness/pain, fevers and/or shaking chills. Read more about driveline management.  Monitor your Device Settings, Alarms and Power sources.  Follow the device self-check instructions your LVAD team has given you or as explained in your device patient handbook.    Note device settings and numbers on your flowsheet if you have one.  Document any unusual alarms and the circumstances around them (such as what your activity was at the time of the alarm, any symptoms you had at the time of the alarm, etc). Remember to bring this information with you to your clinic visits to share with your LVAD team.   If your controller or other components are dirty, clean them carefully with a damp cloth.  Be careful not to splash liquids on your equipment and do not submerge them in water. Most equipment display screens (such as on bedside monitors or controllers, depending upon your device) should only be cleaned with a dry, soft cloth.  Consult your device manual or handbook for additional details.  Take your Medications as Directed   Its very important to take your medications and supplements according to your doctors instructions, at the same times each day. Visit the medications section of this site for information on commonly prescribed medications for LVAD patients, plus a chart you can print out and use to help keep track of your medications. Check your Weight    You measure your weight to help detect if you are retaining fluid. Try to weigh yourself around the same time every day, as your weight can fluctuate throughout the day. The ideal time is when you first wake up and get out of bed. If possible, zero your scale before stepping on it. Notify your LVAD team with any concerns you have about weight gain or loss. Be Aware of What and How Much you are Eating    While you were in the hospital, you may have needed to take dietary supplements because of poor nutrition related to your disease. If your clinicians direct you to keep taking them after you leave the hospital, be sure to continue them as instructed.  Some of the fluid and dietary restrictions that you had before your LVAD implant may change once you leave the hospital. Work with your LVAD team to make sure you understand any new diet or fluid instructions. Contact your LVAD team if you have any significant changes in your ability to tolerate food or beverages (e.g., stomach or gastrointestinal flu, nausea, vomiting, or diarrhea). Its important not to become dehydrated and under-nourished, as this could cause significant problems with your pump or your overall health. From Website: www.[employer]. [employer] [email or web address]/patients-caregivers/lvad-lifestyle/living-lvad/lvad-daily-maintenance  Heart-Healthy Diet: Care Instructions  Your Care Instructions     A heart-healthy diet has lots of vegetables, fruits, nuts, beans, and whole grains, and is low in salt. It limits foods that are high in saturated fat, such as meats, cheeses, and fried foods. It may be hard to change your diet, but even small changes can lower your risk of heart attack and heart disease. Follow-up care is a key part of your treatment and safety. Be sure to make and go to all appointments, and call your doctor if you are having problems. It's also a good idea to know your test results and keep a list of the medicines you take. How can you care for yourself at home? Watch your portions  · Use food labels to learn what the recommended servings are for the foods you eat. · Eat only the number of calories you need to stay at a healthy weight. If you need to lose weight, eat fewer calories than your body burns (through exercise and other physical activity). Eat more fruits and vegetables  · Eat a variety of fruit and vegetables every day. Dark green, deep orange, red, or yellow fruits and vegetables are especially good for you. Examples include spinach, carrots, peaches, and berries. · Keep carrots, celery, and other veggies handy for snacks. Buy fruit that is in season and store it where you can see it so that you will be tempted to eat it. · Cook dishes that have a lot of veggies in them, such as stir-fries and soups.   Limit saturated fat  · Read food labels, and try to avoid saturated fats. They increase your risk of heart disease. · Use olive or canola oil when you cook. · Bake, broil, grill, or steam foods instead of frying them. · Choose lean meats instead of high-fat meats such as hot dogs and sausages. Cut off all visible fat when you prepare meat. · Eat fish, skinless poultry, and meat alternatives such as soy products instead of high-fat meats. Soy products, such as tofu, may be especially good for your heart. · Choose low-fat or fat-free milk and dairy products. Eat foods high in fiber  · Eat a variety of grain products every day. Include whole-grain foods that have lots of fiber and nutrients. Examples of whole-grain foods include oats, whole wheat bread, and brown rice. · Buy whole-grain breads and cereals, instead of white bread or pastries. Limit salt and sodium  · Limit how much salt and sodium you eat to help lower your blood pressure. · Taste food before you salt it. Add only a little salt when you think you need it. With time, your taste buds will adjust to less salt. · Eat fewer snack items, fast foods, and other high-salt, processed foods. Check food labels for the amount of sodium in packaged foods. · Choose low-sodium versions of canned goods (such as soups, vegetables, and beans). Limit sugar  · Limit drinks and foods with added sugar. These include candy, desserts, and soda pop. Limit alcohol  · Limit alcohol to no more than 2 drinks a day for men and 1 drink a day for women. Too much alcohol can cause health problems. When should you call for help? Watch closely for changes in your health, and be sure to contact your doctor if:    · You would like help planning heart-healthy meals. Where can you learn more? Go to http://www.CureSquare.com/  Enter V137 in the search box to learn more about \"Heart-Healthy Diet: Care Instructions. \"  Current as of: December 17, 2020               Content Version: 12.8  © 8339-7297 Healthwise, Incorporated. Care instructions adapted under license by [employer] (which disclaims liability or warranty for this information). If you have questions about a medical condition or this instruction, always ask your healthcare professional. Jason Silvestre any warranty or liability for your use of this information. Low Sodium Diet (2,000 Milligram): Care Instructions  Overview     Limiting sodium can be an important part of managing some health problems. The most common source of sodium is salt. People get most of the salt in their diet from canned, prepared, and packaged foods. Fast food and restaurant meals also are very high in sodium. Your doctor will probably limit your sodium to less than 2,000 milligrams (mg) a day. This limit counts all the sodium in prepared and packaged foods and any salt you add to your food. Follow-up care is a key part of your treatment and safety. Be sure to make and go to all appointments, and call your doctor if you are having problems. It's also a good idea to know your test results and keep a list of the medicines you take. How can you care for yourself at home? Read food labels  · Read labels on cans and food packages. The labels tell you how much sodium is in each serving. Make sure that you look at the serving size. If you eat more than the serving size, you have eaten more sodium. · Food labels also tell you the Percent Daily Value for sodium. Choose products with low Percent Daily Values for sodium. · Be aware that sodium can come in forms other than salt, including monosodium glutamate (MSG), sodium citrate, and sodium bicarbonate (baking soda). MSG is often added to Asian food. When you eat out, you can sometimes ask for food without MSG or added salt.   Buy low-sodium foods  · Buy foods that are labeled \"unsalted\" (no salt added), \"sodium-free\" (less than 5 mg of sodium per serving), or \"low-sodium\" (140 mg or less of sodium per serving). Foods labeled \"reduced-sodium\" and \"light sodium\" may still have too much sodium. Be sure to read the label to see how much sodium you are getting. · Buy fresh vegetables, or frozen vegetables without added sauces. Buy low-sodium versions of canned vegetables, soups, and other canned goods. Prepare low-sodium meals  · Cut back on the amount of salt you use in cooking. This will help you adjust to the taste. Do not add salt after cooking. One teaspoon of salt has about 2,300 mg of sodium. · Take the salt shaker off the table. · Flavor your food with garlic, lemon juice, onion, vinegar, herbs, and spices. Do not use soy sauce, lite soy sauce, steak sauce, onion salt, garlic salt, celery salt, or ketchup on your food. · Use low-sodium salad dressings, sauces, and ketchup. Or make your own salad dressings and sauces without adding salt. · Use less salt (or none) when recipes call for it. You can often use half the salt a recipe calls for without losing flavor. Other foods such as rice, pasta, and grains do not need added salt. · Rinse canned vegetables, and cook them in fresh water. This removes somebut not allof the salt. · Avoid water that is naturally high in sodium or that has been treated with water softeners, which add sodium. If you buy bottled water, read the label and choose a sodium-free brand. Avoid high-sodium foods  · Avoid eating:  ? Smoked, cured, salted, and canned meat, fish, and poultry. ? Ham, floyd, hot dogs, and luncheon meats. ? Regular, hard, and processed cheese and regular peanut butter. ? Crackers with salted tops, and other salted snack foods such as pretzels, chips, and salted popcorn. ? Frozen prepared meals, unless labeled low-sodium. ? Canned and dried soups, broths, and bouillon, unless labeled sodium-free or low-sodium. ? Canned vegetables, unless labeled sodium-free or low-sodium. ? [place] fries, pizza, tacos, and other fast foods.   ? Pickles, olives, ketchup, and other condiments, especially soy sauce, unless labeled sodium-free or low-sodium. Where can you learn more? Go to http://www.gray.com/  Enter V843 in the search box to learn more about \"Low Sodium Diet (2,000 Milligram): Care Instructions. \"  Current as of: December 17, 2020               Content Version: 12.8  © 2006-2021 [employer]. Care instructions adapted under license by [employer] (which disclaims liability or warranty for this information). If you have questions about a medical condition or this instruction, always ask your healthcare professional. [address] any warranty or liability for your use of this information.

## 2021-07-21 NOTE — TELEPHONE ENCOUNTER
Telephone Call RE:  Appointment reminder     Outcome:     [x] Patient confirmed appointment   [] Patient rescheduled appointment for    [] Unable to reach   [] Left message              [] Other:       Confirmed appt with patient, screened for Covid. Reminder to arrive 15 minutes early for check-in and screening.   Eliu Rivera

## 2021-07-21 NOTE — TELEPHONE ENCOUNTER
----- Message from Keara Pickard RN sent at 7/21/2021  2:08 PM EDT -----  Bj Reyesbrigid from [facility] [phone] stating she could not get a dopplerable pulse in right wrist, states daughter has not been able to for last couple of days-left vm.

## 2021-07-22 NOTE — PROGRESS NOTES
[facility] in [place], [facility] Note    Patient name: Billy Ferrer  Patient : 1950  Patient MRN: [id number]  Date of service: 21    Primary care physician: Randal Genao NP  Primary general cardiologist:    Primary [facility] cardiologist: Nilda Swift MD      CHIEF COMPLAINT:    Chief Complaint   Patient presents with    Follow-up     LVAD    Shortness of Breath     \"with exertion\"    Leg Swelling        PLAN:  General goals of care are pt comfort, maximizing quality of life. Pt does not want re-hospitalization or invasive procedures. Recently discharged from hospice due to improvement in renal function; latest labs show acute worsening of renal function again  Need repeat labs today to reassess progress of renal function  Continue LVAD at current speed  Rales and LE edema noted on exam- continue current dose of bumex 1mg daily; pt taking at night as she is incontinent through the night, and finds it less disruptive to diurese overnight rather than having to go to the bathroom frequently during the day. Await lab results and discussion with nephrology and hospice to decide on additional diuretics- pt is not interested in outpatient dialysis  Pt c/o post-prandial epigastric discomfort, ? reflux- discussed supportive care for digestion including small meals, eating slowly, chewing thoroughly, avoid large amt of liquids with meals, sit uprights 30 mins after eating. Can try tums prn if needed.   Continue hydralazine 100mg TID and imdur to 30mg twice daily  Cont Tafamidis 61mg daily  Intolerant to BB/ACEi/ARB/ARNI due to aTTR  Intolerant to MRA due to hyperkalemia  Reinforced heart healthy, low salt diet  Reinforced appropriate fluid intake of 6 x 8oz glasses of water per day  Advanced care plan present on file  Follow-up with nephrology and hospice physician      [facility] with NP/MD        IMPRESSION:  Chronic RV failure  Coronary artery disease  · [facility] (8/2016) high grade ramus and small PDA disease, borderline disease of LAD and takeoff of pRCA  Chronic systolic heart failure  · Stage C, NYHA class IV improved to IIIA symptoms with LVAD  · Combined ischemic and non-ischemic cardiomyopathy, 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 Lurdes Rahman)  · C/b delayed extubation due to severe COPD  · C/b critical illness polyneuropathy  · C/b prolonged hospitalization post-LVAD, 55 days  · C/b sternal wound infection s/p debridement (by Dr. Jayjay Johansen) s/p wound vac  · C/b sacral ulcer  · Would culture positive for Staph aureus, not MRSA  · C/b LVAD site drainage, improved  S/p CRT-ICD  · ICD fired due to electrolyte imbalance (2011)  H/o breast cancer (1992)   · s/p bilateral mastectomy/chemo and endometrial cancer s/p hysterectomy  · Lymphedema of LLE due to cancer treatment  Severe COPD with FEV1 50%  Depression  Atrial fibrillation  H/o \"two mini strokes\"  S/p fall with hip facture   · Right hip hemmiarthroplasty (5/23/18) by Dr. Shanon Gonsalez)  · S/p removed hip hardwarare due to pain (4/15/19)  COPD severe  CKD, stage 4  Pulmonary hypertension  Cardiac risk factors:  · Morbid obesity, Body mass index is 42.29 kg/m².   · DM2 insulin dependent  · JED on Trilogy  · HL  Urinary incontinence, severe  · no procedures due to anticoagulation  Endometrial cancer (2002)  HTN  HL  DNR/DNI      CARDIAC IMAGING:  TTE 5/11/21 LVEF < 15%, LVIDd 5.96cm, TAPSE 0.99cm, RVIDd 4.14cm  TTE 4/19 shows improved LVIDd, 6.68 cm with RVIDd 5.14 cm and TAPSE 1.1 cm   TTE 4/28/21 LVIDd 7.37cm, RVIDd 5.27cm, TAPSE 1.44cm   Echo (9/24/19) LVEF 20-25%, AV opens 1:1, no AR  Echo (5/29/18) LVEF 10-%, ramps study done, report in [facility]  Echo (1/9/18) ramp study done, LVEDD 7.1cm  [facility] (11/9/18) 2 vessel disease with 90% OM, 80% PDA, DSA to PDA branch  RHC 4/20 showed adequate Sal CI 3.0 but severely elevated RA pressure 24 mmHg  CXR negative 4/27/21    HEMODYNAMICS:  RHC not done  CPEST not done  6MW not done    OTHER IMAGING:  CXR  CT chest not done    HISTORY OF PRESENT ILLNESS:  I had the pleasure of seeing Myra Rowland in [facility] at [facility] in [address] [facility]. Briefly, Myra Rowland is a 78 y.o. female with h/o LVAD implant with HM 2 and ESRD requiring HD (now off HD). She had some recent hospitalizations and additional complications and ultimately decided to discharge home with hospice support. Her UOP increased and repeat labs at home showed improved renal function, and she was discharged from Hospice services for not meeting criteria. She is still at home, living with her daughter, now with home health services in place instead of hospice. INTERVAL HISTORY:  Today, patient presents for routine clinic visit. She reports not feeling great, is fatigued. Not sleeping well, poor appetite. Having epigastric cramping pain intermittently. REVIEW OF SYSTEMS:  Review of Systems   Constitutional: Positive for malaise/fatigue. Negative for chills, fever and weight loss. Respiratory: Positive for shortness of breath. Negative for cough. Cardiovascular: Positive for leg swelling. Negative for chest pain and palpitations. Gastrointestinal: Positive for heartburn. Negative for constipation, diarrhea, nausea and vomiting. Neurological: Negative for dizziness and focal weakness. PHYSICAL EXAM:  Visit Vitals  BP (!) 78/0 (BP 1 Location: Right lower arm, BP Patient Position: Sitting, BP Cuff Size: Adult)   Pulse 86   Temp 98.7 °F (37.1 °C) (Oral)   Resp 20   Ht 5' 7\" (1.702 m)   Wt 234 lb 1.6 oz (106.2 kg)   SpO2 98%   BMI 36.67 kg/m²     Physical Exam  Constitutional:       Appearance: She is obese. HENT:      Head: Normocephalic and atraumatic. Eyes:      Conjunctiva/sclera: Conjunctivae normal.      Pupils: Pupils are equal, round, and reactive to light. Cardiovascular:      Comments: LVAD Humm noted on auscultation  Pulmonary:      Effort: Pulmonary effort is normal. No respiratory distress. Breath sounds: Rales present. Abdominal:      General: Bowel sounds are normal. There is no distension. Palpations: Abdomen is soft. Tenderness: There is no abdominal tenderness. Musculoskeletal:      Cervical back: Neck supple. Right lower le+ Pitting Edema present. Left lower le+ Pitting Edema present. Skin:     General: Skin is warm and dry. Capillary Refill: Capillary refill takes less than 2 seconds. Neurological:      General: No focal deficit present. Mental Status: She is alert and oriented to person, place, and time. PAST MEDICAL HISTORY:  Past Medical History:   Diagnosis Date    Asthma     Cancer ([facility]     breast    Cancer ([facility]     endometrial    Congestive heart failure, unspecified     CRI (chronic renal insufficiency)     Depression     Diabetes ([facility]     Hypertension     Severe sepsis with acute organ dysfunction ([facility] 2021       PAST SURGICAL HISTORY:  Past Surgical History:   Procedure Laterality Date    HX HERNIA REPAIR      HX HYSTERECTOMY      HX MASTECTOMY      IR INSERT NON TUNL CVC OVER 5 YRS  2021    IR INSERT TUNL CVC W/O PORT OVER 5 YR  2021       FAMILY HISTORY:  No family history on file.     SOCIAL HISTORY:  Social History     Socioeconomic History    Marital status:      Spouse name: Not on file    Number of children: Not on file    Years of education: Not on file    Highest education level: Not on file   Tobacco Use    Smoking status: Never Smoker    Smokeless tobacco: Never Used   Substance and Sexual Activity    Alcohol use: Not Currently     Social Determinants of Health     Financial Resource Strain:     Difficulty of Paying Living Expenses:    Food Insecurity:     Worried About [address] [employer] in the Last Year:     [address] in the Last Year:    Transportation Needs:     Lack of Transportation (Medical):  Lack of Transportation (Non-Medical):    Physical Activity:     Days of Exercise per Week:     Minutes of Exercise per Session:    Stress:     Feeling of Stress :    Social Connections:     Frequency of Communication with Friends and Family:     Frequency of Social Gatherings with Friends and Family:     Attends Orthodoxy Services:     Active Member of Clubs or Organizations:     Attends Club or Organization Meetings:     Marital Status:        LABORATORY RESULTS:  Labs Latest Ref Rng & Units 5/27/2021 5/26/2021 5/25/2021 5/25/2021 5/24/2021 5/23/2021   WBC 3.6 - 11.0 K/uL 3.6 2.8(L) - 4.7 4.6 4.2   RBC 3.80 - 5.20 M/uL 2.87(L) 2.98(L) - 3.12(L) 3.22(L) 3.26(L)   Hemoglobin 11.5 - 16.0 g/dL 8. 0(L) 8. 3(L) - 8. 6(L) 8.7(L) 8. 9(L)   Hematocrit 35.0 - 47.0 % 27. 0(L) 28. 1(L) - 28. 7(L) 29. 7(L) 29. 8(L)   MCV 80.0 - 99.0 FL 94.1 94.3 - 92.0 92.2 91.4   Platelets 637 - 648 K/uL 138(L) 132(L) - 130(L) 129(L) 139(L)   Lymphocytes 12 - 49 % 17 22 - 11(L) 11(L) 10(L)   Monocytes 5 - 13 % 11 11 - 9 9 8   Eosinophils 0 - 7 % 0 0 - 0 0 0   Basophils 0 - 1 % 1 1 - 1 1 1   Albumin 3.5 - 5.0 g/dL 3. 0(L) 3. 1(L) 3. 3(L) 3. 2(L) 3.0(L) 3.0(L)   Calcium 8.5 - 10.1 MG/DL 9.0 9.4 8.7 9.2 8.9 8.8   Glucose 65 - 100 mg/dL 89 134(H) 123(H) 121(H) 119(H) 112(H)   BUN 6 - 20 MG/DL 23(H) 16 10 27(H) 22(H) 14   Creatinine 0.55 - 1.02 MG/DL 4.11(H) 3.48(H) 2.27(H) 5.11(H) 4.15(H) 3.14(H)   Sodium 136 - 145 mmol/L 139 139 137 137 134(L) 136   Potassium 3.5 - 5.1 mmol/L 3.2(L) 3.9 3.4(L) 3. 3(L) 3.6 3.8   LDH 81 - 246 U/L 174 195 - 197 194 210   Some recent data might be hidden       ALLERGY:  Allergies   Allergen Reactions    Atarax [Hydroxyzine Hcl] Other (comments)     Small doses tolerated. Full dose 10mg causes oversedation.      Benzodiazepines Other (comments)     Respiratory issues        CURRENT MEDICATIONS:    Current Outpatient Medications:     PARoxetine (PAXIL) 20 mg tablet, Take 20 mg by mouth daily. Indications: anxiousness associated with depression, Disp: , Rfl:     warfarin (COUMADIN) 3 mg tablet, Take 3 mg by mouth daily. , Disp: , Rfl:     hydrOXYzine HCL (ATARAX) 10 mg tablet, Take 2.5 mg by mouth every six (6) hours as needed for Itching., Disp: , Rfl:     levETIRAcetam (KEPPRA) 500 mg tablet, Take 250 mg by mouth two (2) times a day. Take half a tablet two times a day  Indications: seizures, Disp: , Rfl:     bumetanide (BUMEX) 1 mg tablet, Take 1 mg by mouth daily. , Disp: , Rfl:     simethicone (MYLICON) 40 NX/9.7 mL drops, Take 40 mg by mouth Before breakfast, lunch, dinner and at bedtime. Indications: gas, Disp: 30 mL, Rfl: 4    loperamide (IMODIUM) 1 mg/5 mL solution, Take 2 mg by mouth four (4) times daily as needed for Diarrhea., Disp: 60 mL, Rfl: 4    acetaminophen (TYLENOL) 650 mg suppository, Insert 650 mg into rectum every six (6) hours as needed for Fever., Disp: , Rfl:     budesonide-formoteroL (SYMBICORT) 160-4.5 mcg/actuation HFAA, Take 2 Puffs by inhalation as needed. , Disp: , Rfl:     tafamidis 61 mg cap, Take 61 mg by mouth daily. , Disp: , Rfl:     isosorbide mononitrate ER (IMDUR) 30 mg tablet, Take 30 mg by mouth daily. , Disp: , Rfl:     albuterol (PROVENTIL HFA, VENTOLIN HFA, PROAIR HFA) 90 mcg/actuation inhaler, Take 2 Puffs by inhalation every four (4) hours as needed for Wheezing., Disp: , Rfl:     hydrALAZINE (APRESOLINE) 25 mg tablet, Take 100 mg by mouth three (3) times daily. MAP >80, Disp: , Rfl:     cephALEXin (KEFLEX) 500 mg capsule, Take 500 mg by mouth daily. 500mg once daily after completing three times daily dose (Patient not taking: Reported on 7/20/2021), Disp: , Rfl:     traZODone (DESYREL) 50 mg tablet, Take 25 mg by mouth nightly.  Give 1/2 (half) tablet by mouth at bedtime Indications: insomnia and anxiety (Patient not taking: Reported on 7/2/2021), Disp: , Rfl:     HYDROcodone-acetaminophen (NORCO) 5-325 mg per tablet, Take 1 Tablet by mouth every four (4) hours as needed for Pain. (Patient not taking: Reported on 7/22/2021), Disp: 60 Tablet, Rfl: 4    morphine (ROXANOL) 100 mg/5 mL (20 mg/mL) concentrated solution, Take 5 mg by mouth every three (3) hours as needed for Shortness of Breath (Unrelieved by Symbicort). (Patient not taking: Reported on 7/2/2021), Disp: , Rfl:     brivaracetam (Briviact) 10 mg/mL oral solution, Take 10 mL by mouth two (2) times a day. Max Daily Amount: 200 mg. (Patient not taking: Reported on 7/2/2021), Disp: 600 mL, Rfl: 0    haloperidol (HALDOL) 2 mg/mL oral concentrate, Take 1 mg by mouth every six (6) hours as needed for Agitation. (Patient not taking: Reported on 7/2/2021), Disp: , Rfl:     prochlorperazine (COMPAZINE) 10 mg tablet, Take 10 mg by mouth every six (6) hours as needed for Nausea or Vomiting. (Patient not taking: Reported on 7/2/2021), Disp: , Rfl:     hyoscyamine SL (LEVSIN/SL) 0.125 mg SL tablet, Take 0.125 mg by mouth every four (4) hours as needed for Secretions. (Patient not taking: Reported on 7/2/2021), Disp: , Rfl:       Jessica Garcias.  Cale Mata, MSN, AGACNP-[address], [address]  Phone: [phone]  Fax: [phone]    PATIENT CARE TEAM:  Patient Care Team:  Tram Knowles NP as PCP - General (Nurse Practitioner)  Apoorva Addison MD (Cardiology)  Pallavi Rodriguez MD as Physician (Cardiology)  Tunde Iqbal LPN as Care Coordinator  Eliana Michaels MD (Cardiothoracic Surgery)  Ventura Trinh MD (Cardiology)

## 2021-07-22 NOTE — PROGRESS NOTES
10:10 7/22/21 SW left a message for [facility] with [address] () to return call re: referral for patient's Trilogy machine.   2:15 7/23/21SW left another message for a return call from [facility]

## 2021-07-23 NOTE — TELEPHONE ENCOUNTER
----- Message from Tatum Montilla RN sent at 7/23/2021  1:56 PM EDT -----  Jensen Blow from [address] [place] [address] left vm for you to call regarding a lab alert.

## 2021-07-23 NOTE — TELEPHONE ENCOUNTER
Neli Vazquez who reported patient WBC of 1.8. Verified by read back. Informed her results given to TIFFANY Walter NP this morning and she is aware. Yajaira Bring verbalized understanding and had no further questions. Julia Ernandez NP aware. Татьяна Lange RN.

## 2021-07-23 NOTE — TELEPHONE ENCOUNTER
Telephone Call RE:  Lab Reminder      Outcome:     [] Patient verbalizes understanding    [x] Unable to reach   [] Left message              []   Patient's voice mail is full.   Indira Machado

## 2021-07-26 NOTE — PROGRESS NOTES
Return call received from patient's daughter. Notified her of recommendations. She requested labs be sent to [facility] in [place]. She verbalized understanding and had no further questions at this time. Sedonia Leventhal, RN.

## 2021-07-26 NOTE — TELEPHONE ENCOUNTER
Per TIFFANY Walter, NP will continue to hold Keflex and reassess after lab received Thursday. Melinda notified. She verbalized understanding and had no further questions. Марина Quarles RN.

## 2021-07-26 NOTE — PROGRESS NOTES
Reviewed labs from 7/22. Significant leukopenia 1.8. Monitor for s/s infection, repeat on Thursday. Hgb 9.1, stable. Cr 2.18, on Bumex 1 mg daily. Continue. K 3.4, begin klor con 20 mEq PO daily. ProBNP 76911. Continue diuretics. Mg 1.9. Begin mag ox 400 mg PO daily. Repeat INR, CBC, CMP, pro-BNP, and Mg on Thursday.

## 2021-07-26 NOTE — TELEPHONE ENCOUNTER
Call received from patient's daughter stating patient has not been on Keflex. She stated it was stopped a few weeks ago because it made her nauseous. Given her low WBC, she inquired if patient should restart Keflex. Inform her will review with provider and contact her with further recommendations. She verbalized understanding and had no further questions.  Viri Rosa Rn.

## 2021-07-26 NOTE — PROGRESS NOTES
Attempted to contact patient's daughter to notify. Message left. Will await return call. Mario Peoples RN.

## 2021-07-28 NOTE — TELEPHONE ENCOUNTER
Telephone Call RE:  Lab Reminder      Outcome:     [x] Patient verbalizes understanding    [] Unable to reach   [] Left message              []       Danyel Dempsey

## 2021-07-29 NOTE — PROGRESS NOTES
Attempted to contact patient's daughter to notify. Voicemail verified by full name. Message left with coumadin instructions and encouraged Deatra Hamman to contact clinic with any further questions. Gabbie Valdivia RN.

## 2021-07-30 NOTE — PROGRESS NOTES
Patient send email stating she had received a message from RADHA Reveles RN regarding coumadin dosing and repeat INR.

## 2021-08-02 NOTE — TELEPHONE ENCOUNTER
Called and spoke with patient's daughter. Melinda verbalized understanding to change dressings daily and to resume Keflex. Spoke with Hailey ([address] [place] [address]) who verbalized understanding to culture drive line site at next visit.

## 2021-08-02 NOTE — TELEPHONE ENCOUNTER
Resume Keflex, daily dressing changes, no showering. [facility] RN to obtain wound culture tomorrow.

## 2021-08-02 NOTE — TELEPHONE ENCOUNTER
Received a call from patient's daughter. Roxanne Munroe reported bleeding from the driveline site for the last 3-4 days. She states the dressing is not completely saturated but is definitely covered with bright red blood. She states it appears as if the driveline was pulled. Roxanne Munroe states she is sending pictures via email, and inquired if patient should resume Keflex (which she states has been stopped). Encouraged Melinda to change dressing daily and will check with provider regarding Keflex.

## 2021-08-10 NOTE — TELEPHONE ENCOUNTER
Telephone Call RE:  Lab Reminder      Outcome:     [x] Patient verbalizes understanding    [] Unable to reach   [] Left message              []       Blanca Channel

## 2021-08-18 NOTE — TELEPHONE ENCOUNTER
Rashad Reeder with [facility] called to inform us that she was supposed to see patient for physical therapy but patients daughter states that patient is nauseous today and would like to resume PT next week. Questions, her call back number is [phone].

## 2021-08-18 NOTE — TELEPHONE ENCOUNTER
Telephone Call RE:  Lab Reminder      Outcome:     [x] Patient verbalizes understanding    [] Unable to reach   [] Left message              []       Papa Ortiz

## 2021-08-20 NOTE — PROGRESS NOTES
Will send rx for wheelchair for pt. She has severe debility from her heart failure and is not able to ambulate independently. Her mobility limitation impairs her ability to participate in ADLs including toileting, feeding, dressing and bathing. These mobility limitations cannot be resolved with the use of a cane or walker. She is not capable of propelling a standard wheelchair, but is able to propel a lightweight chair. Use of a lightweight wheelchair can resolve her mobility deficits. She has the strength to maneuver a lightweight chair, and in the event this would change, she does have a 24 hour caregiver who could maneuver it.       Reyna Frank NP  [address] [facility]  [address] [facility], [address]  [address]  Office [phone]  Fax [phone]

## 2021-08-20 NOTE — PROGRESS NOTES
Order and notes for wheelchair faxed to [facility] with [facility]

## 2021-08-23 NOTE — PROGRESS NOTES
TIMOTHY spoke with Aniceto Knowles with [facility] shared they are short staffed,  However Aniceto Knowles will be working on patient's [facility] referral next. She stated the order needs to be processed and as soon as it is ready they will reach out to patient's daughter, Misha Graves. Processing could be completed today or tomorrow. TIMOTHY left a voicemail message for Misha Graves, provided her with previous information from [facility].

## 2021-08-24 NOTE — TELEPHONE ENCOUNTER
Patient's [facility] is requesting additional information for lightweight wheelchair order and approval

## 2021-08-25 NOTE — TELEPHONE ENCOUNTER
Telephone Call RE:  Lab Reminder      Outcome:     [x] Patient verbalizes understanding    [] Unable to reach   [] Left message              []       Larisa Rodriguez

## 2021-08-26 NOTE — TELEPHONE ENCOUNTER
Message received from patient's daughter, Maddy Montes De Oca (PHI release Rohitell Gazella verified), reporting pt has had \"some fluid in her tissues in her arms. \" She stated it mostly occurs in her \"breast cancer arm,\" which she has had lymphadema in for several years. She stated it is, \"a little tighter and puffy. Fluid is not pushing out but, if she scratches the arm or picks a spot, it will leak a bit. \" She reported she otherwise is doing well. Pt has had good urine output, weight has been stable within 2 lbs, denies any shortness of breath, and  RN informed them lungs are clear per Maddy Montes De Oca. She reported \"PI's are steady in the 5's and she's taking her medicine on a very strict schedule. \" She stated pt has been having difficulty with Trilogy at night and has not been sleeping well, but last night was able to wear for 10 hours. She is inquiring if patient should take 0.5 or 1mg of bumex for one day, or if there are alternative options, to see if arm swelling improves. She stated she does not want to \"tax\" pt's kidneys. She requested Secure email be sent with recommendations as she is in and out of meetings for work and may not be available via phone. Will review with provider and contact patient's daughter with further recommendations. Genny Lucia RN.

## 2021-08-26 NOTE — PROGRESS NOTES
SW has refaxed order with [facility] NP addendum to her OV note, and demographics to [facility] in an attempt to try and get a lightweight wheelchair for this patient. Once the order has been received and an update on the status of the [facility] order from [facility] has been obtain, TIMOTHY will notify Dane Smith, patient's daughter. TIMOTHY received a confirmation for the faxed order for a light weight WC for patient. TIMOTHY left a message for a return call from Tess Garner 656 to discuss confirmation of receipt of order and if it has been accepted. SW will await a return call.

## 2021-08-26 NOTE — TELEPHONE ENCOUNTER
Call received from Sandeep with [facility] who stated patient also having swelling in her legs with some weeping. Reviewed with TIFFANY Walter NP who recommended V.O.R.B. patient take additional 1mg dose today and home health to recheck full set of labs next week. Contacted [facility] and spoke with Ezekiel Benedict. Informed her of need for full set of labs next week. Per Jc Russ will be going out on Wednesday and will ensure she gets full set of labs. Ezekiel Benedict had no further questions. Secure email sent to patient's daughter per her request. Lary Dolan RN.

## 2021-08-26 NOTE — TELEPHONE ENCOUNTER
Reyes Rangel NP  You 2 minutes ago (11:28 AM)   LP  Why don't we start with 0.5 and see what happens.      Message text

## 2021-08-30 NOTE — TELEPHONE ENCOUNTER
Message received from patient's daughter via secure email reporting patient took 1.5mg of bumex daily over the weekend and weight has decreased by 3.5lbs. She reported leg swelling has significantly decreased, but reported patient \"a little more shaky and unsettled,\" and inquired if she should continue the 1.5mg dose. She expressed concerns about patient's renal function. She also reported home health nurse will visit Wednesday and attempt labs, but in the past has had difficulty. She stated that she will take patient to [facility] on Thursday or Friday if home health unable to draw, and inquired if they should do INR with home machine Wednesday if they go to lab maame.     Reviewed with TIFFANY Walter NP who recommended patient hold bumex 8/30/21, then resume at previous dose of 1mg daily on 8/31/21. Secure email sent back to patient's daughter notifying. Also requested INR be sent Wednesday if home health unable to draw labs. She verbalized understanding and had no further questions. Mario Peoples RN.

## 2021-08-31 NOTE — TELEPHONE ENCOUNTER
Telephone Call RE:  Lab Reminder      Outcome:     [x] Patient verbalizes understanding    [] Unable to reach   [] Left message              []       Walker Cedeño

## 2021-08-31 NOTE — PROGRESS NOTES
TIMOTHY spoke with Misha Graves, notified her no new updates from [facility]. Documentation has been sent to and received by [facility]. TIMOTHY will follow up with Msiha Graves once SW has received an update. TIMOTHY left a voicemail message for Aniceto Knowles with [facility] for an update on ordered wheelchair. SW will await a return call. TIMOTHY received a return call from Aniceto Knowles with [facility], she stated she needed the wheelchair prescription signed before it can be filled. TEA Mata signed prescription for [facility] and it was faxed back to Aniceto Knowles with [facility] and a confirmation received. TIMOTHY left a voicemail message for Aniceto Knowles to return call to verify confirmation WC order/prescription was filled. TIMOTHY will continue to follow.

## 2021-09-02 PROBLEM — I50.9 CHF EXACERBATION (HCC): Status: ACTIVE | Noted: 2021-01-01

## 2021-09-02 PROBLEM — N17.9 AKI (ACUTE KIDNEY INJURY) (HCC): Chronic | Status: ACTIVE | Noted: 2020-01-01

## 2021-09-02 NOTE — PROGRESS NOTES
Pharmacist Note  Warfarin Dosing  Consult provided for this 73 y. o.female to manage warfarin for LVAD    INR Goal:1.8-2.5 (2/2 GIB)    Home regimen/ tablet size: 3 mg daily    Drugs that may increase INR: None  Drugs that may decrease INR: None  Other current anticoagulants/ drugs that may increase bleeding risk: None  Risk factors: Age > 65  Daily INR ordered: YES    Recent Labs     09/02/21  1530 09/02/21  1256 09/01/21  0000   HGB  --  8.1*  --    INR 2.4*  --  3.4     Date               INR                  Dose  9/2   2.4     3 mg                                                                                 Assessment/ Plan: Will order warfarin 3 mg PO x 1 dose. Pharmacy will continue to monitor daily and adjust therapy as indicated. Please contact the pharmacist at   for outpatient recommendations if needed.

## 2021-09-02 NOTE — ED TRIAGE NOTES
TRIAGE NOTE:  Patient arrives referred by heart failure center for fluid overload, and for abnormal kidney function. Patient does have LVAD Heartmate 2.

## 2021-09-02 NOTE — TELEPHONE ENCOUNTER
Call received [place] [id number] with [facility] reporting the following labs values from labs drawn yesterday:    WBC: 2.2  RBC: 2.33  Hgb: 6.9  Hct: 20.5  Platelets: 765  BUN: 92  Cr: 6.7  K: 5.5    Results verified by read back. Per Hailey labs have been faxed. Notified Klever Keita NP and TIFFANY Walter NP who requested to review labs once received. Will await fax. Adrienne Landis RN.

## 2021-09-02 NOTE — H&P
History & Physical    Primary Care Provider: Dao Hernandez NP  Source of Information: Patient     Please note that this dictation was completed with Enhance[employer], the computer voice recognition software. Quite often unanticipated grammatical, syntax, homophones, and other interpretive errors are inadvertently transcribed by the computer software. Please disregard these errors. Please excuse any errors that have escaped final proofreading. History of Presenting Illness:   Celena Walsh is a 73 y.o. female who presents acute renal failure referred by heart failure center for fluid overload, and for abnormal kidney function. Patient does have LVAD Heartmate 2. Her past medical history includes heart failure s/p LVAD, COPD, CKD stage 3, pulmonary HTN, Depression, HTN, and HLD. As per daughter at bedside patient was previously followed by LVAD team and then was in hospice then stopped all the medications did do well she still had a permacath and gradually after doing well came out of hospice. Yesterday home health nurses came drawn the labs and after reviewing the labs they were sent to the ED for acute renal failure. As discussed before patient has CKD as well and had a permacath currently not in use. The patient denies any fever, chills, chest pain, cough, congestion, recent illness, palpitations, or dysuria. Review of Systems:  Pertinent items are noted in the History of Present Illness.      Past Medical History:   Diagnosis Date    Asthma     Cancer ([address]     breast    Cancer ([address]     endometrial    Congestive heart failure, unspecified     CRI (chronic renal insufficiency)     Depression     Diabetes (HCC)     Hypertension     Severe sepsis with acute organ dysfunction ([address] 5/11/2021      Past Surgical History:   Procedure Laterality Date    HX HERNIA REPAIR      HX HYSTERECTOMY      HX MASTECTOMY      IR INSERT NON TUNL CVC OVER 5 YRS 4/26/2021    IR INSERT TUNL CVC W/O PORT OVER 5 YR  5/5/2021     Prior to Admission medications    Medication Sig Start Date End Date Taking? Authorizing Provider   Wheel Chair niurka Daily use due to severe class IV HF symptoms, deconditioning and high fall risk. Pt has severe debility from her heart failure and is not able to ambulate independently. Her mobility limitation impairs her ability to participate in ADLs including toileting, feeding, dressing and bathing. These mobility limitations cannot be resolved with the use of a cane or walker. She is not capable of propelling a standard wheelchair, but is able to propel a lightweight chair. Use of a lightweight wheelchair can resolve her mobility deficits. She has the strength to maneuver a lightweight chair. 8/26/21   Kylah Heard NP   potassium chloride (KLOR-CON) 10 mEq tablet Take 4 Tablets by mouth daily. 7/26/21   Sharon Walter NP   magnesium oxide (MAG-OX) 400 mg tablet Take 1 Tablet by mouth daily. 7/26/21   Sharon Walter NP   isosorbide mononitrate ER (IMDUR) 30 mg tablet Take 1 Tablet by mouth daily. 7/22/21   Kylah Heard NP   PARoxetine (PAXIL) 20 mg tablet Take 1 Tablet by mouth daily. Indications: anxiousness associated with depression 7/22/21   Annemarie HOFFMAN NP   levETIRAcetam (KEPPRA) 500 mg tablet Take 0.5 Tablets by mouth two (2) times a day. Take half a tablet two times a day  Indications: seizures 7/22/21   Annemarie HOFFMAN NP   bumetanide (BUMEX) 1 mg tablet Take 1 Tablet by mouth daily. Additional doses as directed 7/22/21   Kylah Heard NP   melatonin 3 mg tablet Take 1 Tablet by mouth nightly. 7/22/21   Kylah Heard NP   warfarin (COUMADIN) 3 mg tablet Take 3 mg by mouth daily. 7/13/21   Provider, Historical   cephALEXin (KEFLEX) 500 mg capsule Take 500 mg by mouth daily.  500mg once daily after completing three times daily dose  Patient not taking: Reported on 7/20/2021 7/13/21 Provider, Historical   hydrOXYzine HCL (ATARAX) 10 mg tablet Take 2.5 mg by mouth every six (6) hours as needed for Itching. 6/25/21   Provider, Historical   traZODone (DESYREL) 50 mg tablet Take 25 mg by mouth nightly. Give 1/2 (half) tablet by mouth at bedtime  Indications: insomnia and anxiety  Patient not taking: Reported on 7/2/2021 6/21/21   Provider, Historical   HYDROcodone-acetaminophen (NORCO) 5-325 mg per tablet Take 1 Tablet by mouth every four (4) hours as needed for Pain. Patient not taking: Reported on 4/10/3913 8/4/21   Tu Chu MD   morphine (ROXANOL) 100 mg/5 mL (20 mg/mL) concentrated solution Take 5 mg by mouth every three (3) hours as needed for Shortness of Breath (Unrelieved by Symbicort). Patient not taking: Reported on 9/6/2442 6/61/77   Tu Chu MD   simethicone (MYLICON) 40 BB/3.5 mL drops Take 40 mg by mouth Before breakfast, lunch, dinner and at bedtime. Indications: gas 5/31/95   Tu Chu MD   loperamide (IMODIUM) 1 mg/5 mL solution Take 2 mg by mouth four (4) times daily as needed for Diarrhea. 5/83/89   Tu Chu MD   haloperidol (HALDOL) 2 mg/mL oral concentrate Take 1 mg by mouth every six (6) hours as needed for Agitation. Patient not taking: Reported on 5/0/2794 4/66/06   Tu Chu MD   prochlorperazine (COMPAZINE) 10 mg tablet Take 10 mg by mouth every six (6) hours as needed for Nausea or Vomiting. Patient not taking: Reported on 0/1/1889 1/07/17   Tu Chu MD   acetaminophen (TYLENOL) 650 mg suppository Insert 650 mg into rectum every six (6) hours as needed for Fever. 9/31/74   Tu Chu MD   hyoscyamine SL (LEVSIN/SL) 0.125 mg SL tablet Take 0.125 mg by mouth every four (4) hours as needed for Secretions. Patient not taking: Reported on 1/3/0314 9/19/77   Tu Chu MD   budesonide-formoteroL [facility]) 160-4.5 mcg/actuation HFAA Take 2 Puffs by inhalation as needed.  5/28/21   Provider, Historical   tafamidis 61 mg cap Take 61 mg by mouth daily. 4/61/33   Wilma Early MD   albuterol (PROVENTIL HFA, VENTOLIN HFA, PROAIR HFA) 90 mcg/actuation inhaler Take 2 Puffs by inhalation every four (4) hours as needed for Wheezing. 6/45/22   Wilma Early MD   hydrALAZINE (APRESOLINE) 25 mg tablet Take 100 mg by mouth three (3) times daily. MAP >31 5/76/94   Jacky Soria MD   levETIRAcetam (KEPPRA) 500 mg tablet Take 250 mg by mouth two (2) times a day. 5/28/21   Provider, Historical     Allergies   Allergen Reactions    Atarax [Hydroxyzine Hcl] Other (comments)     Small doses tolerated. Full dose 10mg causes oversedation.  Benzodiazepines Other (comments)     Respiratory issues      No family history on file. SOCIAL HISTORY:  Patient resides:  Independently    Assisted Living    SNF    With family care x      Smoking history:   None x   Former    Chronic      Alcohol history:   None x   Social    Chronic      Ambulates:   Independently x   w/cane    w/walker    w/wc    CODE STATUS:  DNR    Full x   Other      Objective:     Physical Exam:     Visit Vitals  Pulse (!) 121   Temp 97.2 °F (36.2 °C)   Resp (!) 35   SpO2 93%           General:  Alert, cooperative, no distress, appears stated age. Head:  Normocephalic, without obvious abnormality, atraumatic. Eyes:  Conjunctivae/corneas clear. PERRL, EOMs intact. Nose: Nares normal. Septum midline. Mucosa normal.   Throat: Lips, mucosa, and tongue normal. Teeth and gums normal.   Neck: Supple, symmetrical, trachea midline,  no carotid bruit and no JVD. Lungs:   Clear to auscultation bilaterally. Heart:   LVAD hum   Abdomen:   Soft, non-tender. Bowel sounds normal. No masses,  No organomegaly. Extremities: Extremities normal, atraumatic, no cyanosis or edema. Pulses: 2+ and symmetric all extremities.    Skin: Skin color, texture, turgor normal. No rashes or lesions   Neurologic:  Alert and awake         EKG: Data Review:     Recent Days:  Recent Labs     09/02/21  1256   WBC 3.1*   HGB 8.1*   HCT 25.0*   *     Recent Labs     09/02/21  1256 09/01/21  0000   *  --    K 5.0  --      --    CO2 23  --    GLU 91  --    BUN 99*  --    CREA 7.38*  --    CA 7.3*  --    MG 2.6*  --    ALB 1.9*  --    TBILI 0.6  --    ALT <6*  --    INR  --  3.4     No results for input(s): PH, PCO2, PO2, HCO3, FIO2 in the last 72 hours. 24 Hour Results:  Recent Results (from the past 24 hour(s))   CBC WITH AUTOMATED DIFF    Collection Time: 09/02/21 12:56 PM   Result Value Ref Range    WBC 3.1 (L) 3.6 - 11.0 K/uL    RBC 2.66 (L) 3.80 - 5.20 M/uL    HGB 8.1 (L) 11.5 - 16.0 g/dL    HCT 25.0 (L) 35.0 - 47.0 %    MCV 94.0 80.0 - 99.0 FL    MCH 30.5 26.0 - 34.0 PG    MCHC 32.4 30.0 - 36.5 g/dL    RDW 18.2 (H) 11.5 - 14.5 %    PLATELET 269 (L) 064 - 400 K/uL    MPV 9.4 8.9 - 12.9 FL    NRBC 0.0 0  WBC    ABSOLUTE NRBC 0.00 0.00 - 0.01 K/uL    NEUTROPHILS 83 (H) 32 - 75 %    LYMPHOCYTES 11 (L) 12 - 49 %    MONOCYTES 4 (L) 5 - 13 %    EOSINOPHILS 0 0 - 7 %    BASOPHILS 0 0 - 1 %    IMMATURE GRANULOCYTES 2 (H) 0.0 - 0.5 %    ABS. NEUTROPHILS 2.6 1.8 - 8.0 K/UL    ABS. LYMPHOCYTES 0.3 (L) 0.8 - 3.5 K/UL    ABS. MONOCYTES 0.1 0.0 - 1.0 K/UL    ABS. EOSINOPHILS 0.0 0.0 - 0.4 K/UL    ABS. BASOPHILS 0.0 0.0 - 0.1 K/UL    ABS. IMM.  GRANS. 0.1 (H) 0.00 - 0.04 K/UL    DF SMEAR SCANNED      RBC COMMENTS ANISOCYTOSIS  1+        RBC COMMENTS OVALOCYTES  PRESENT       METABOLIC PANEL, COMPREHENSIVE    Collection Time: 09/02/21 12:56 PM   Result Value Ref Range    Sodium 133 (L) 136 - 145 mmol/L    Potassium 5.0 3.5 - 5.1 mmol/L    Chloride 104 97 - 108 mmol/L    CO2 23 21 - 32 mmol/L    Anion gap 6 5 - 15 mmol/L    Glucose 91 65 - 100 mg/dL    BUN 99 (H) 6 - 20 MG/DL    Creatinine 7.38 (H) 0.55 - 1.02 MG/DL    BUN/Creatinine ratio 13 12 - 20      GFR est AA 7 (L) >60 ml/min/1.73m2    GFR est non-AA 5 (L) >60 ml/min/1.73m2 Calcium 7.3 (L) 8.5 - 10.1 MG/DL    Bilirubin, total 0.6 0.2 - 1.0 MG/DL    ALT (SGPT) <6 (L) 12 - 78 U/L    AST (SGOT) 19 15 - 37 U/L    Alk. phosphatase 83 45 - 117 U/L    Protein, total 7.7 6.4 - 8.2 g/dL    Albumin 1.9 (L) 3.5 - 5.0 g/dL    Globulin 5.8 (H) 2.0 - 4.0 g/dL    A-G Ratio 0.3 (L) 1.1 - 2.2     SAMPLES BEING HELD    Collection Time: 09/02/21 12:56 PM   Result Value Ref Range    SAMPLES BEING HELD 1RED     COMMENT        Add-on orders for these samples will be processed based on acceptable specimen integrity and analyte stability, which may vary by analyte. MAGNESIUM    Collection Time: 09/02/21 12:56 PM   Result Value Ref Range    Magnesium 2.6 (H) 1.6 - 2.4 mg/dL   EKG, 12 LEAD, INITIAL    Collection Time: 09/02/21 12:59 PM   Result Value Ref Range    Ventricular Rate 117 BPM    Atrial Rate 117 BPM    QRS Duration 202 ms    Q-T Interval 430 ms    QTC Calculation (Bezet) 599 ms    Calculated R Axis -73 degrees    Calculated T Axis 88 degrees    Diagnosis       Ventricular-paced rhythm  Abnormal ECG  When compared with ECG of 26-MAY-2021 13:37,  Vent. rate has increased BY  46 BPM           Imaging:     US RETROPERITONEUM COMP    Result Date: 9/2/2021  1. Sonographic evidence of medical renal disease. 2.  No hydronephrosis or nephrolithiasis. Admit to inpatient status      Patient was explained about the risk of admission including and not a complete list including risk of falls,fractures,blood clots,allergic reactions,infections. Patient/family also understands and agrees to the treatment plan including medications and side effect profiles and also understand the risk with radiation while undergoing imaging studies. The patient and the family/friends (after permission given by the patient to discuss) understand this and agree with the admission plan.         Assessment:     Active Problems:    CHF exacerbation ([facility] [address] [address] (9/2/2021)           Plan:     Acute renal failure  Unclear etiology does not look like cardiorenal  Patient was on diuretics will hold  Fletcher catheter insertion care I's and O's Daily weight  Ordered for ultrasonogram of kidneys  Nephrology consulted   Patient has permacath but not on dialysis    Chronic systolic congestive heart failure   - NYHA class IV   - s/p LVAD heartMate 2   - Advanced heart failure team following   - Holding Bumex does not look like volume overloaded  - Cardiac diet   -Get echocardiogram     COPD  - PRN nebs      DM   -A1c  - Monitor BG AC/HS      BLE edema with ulcers   - Elevate legs   - Wound care following      JED   - Continue home trilogy    Hx of Sz d/o:  - on keppra     Morbid Obesity       Requested urinalysis and portable chest x-ray to rule out any infection pending at the time of admission    DVT prophylaxis on Coumadin pharmacy to dose  CODE STATUS full code         Signed By: Katheen Lanes, MD     September 2, 2021

## 2021-09-02 NOTE — TELEPHONE ENCOUNTER
Labs received and reviewed by TIFFANY Walter NP who recommend patient present to ER today for further evaluation. Contacted patient's daughter to Lamar Covarrubias. She stated patient had a, \"Really bad day,\" yesterday. She stated she will discuss with patient and return call with patient's decision. Informed her if patient does not wish to present to ER discussions will need to take place on plan of care given significantly abnormal lab results. Melinda verbalized understanding and had no further questions. 1030: Spoke with patient's daughter who confirmed they will present to ER. Ayde Argueta NP notified. She verbalized understanding and stated she will notify ER. Pepito Mathias RN.

## 2021-09-02 NOTE — CONSULTS
NEPHROLOGY CONSULT NOTE     Patient: Dary Malik MRN: [id number]  PCP: Kiet Mcleod NP   :     1950  Age:   73 y.o. Sex:  female      Referring physician: Joaquim Torres MD  Reason for consultation: 73 y.o. female with CHF exacerbation ([facility] [address] [M31.5] complicated by ROCIO   Admission Date: 2021  1:12 PM  LOS: 0 days      ASSESSMENT and PLAN :   ROCIO on CKD:  - IVVD from diuretics, less likely CRS  - hold diuretics  - place sahu  - renal U/S  - start IVF  - daily labs and I/Os  - no urgent need for RRT at this time  - poor candidate regardless     CKD IV:  - baseline Cr around 2 at baseline    Chronic systolic CHF, stage C NYHA class IV  Combined ischemic and nonischemic cardiomyopathy  S/p HM 2 LVAD implant 4/15/2017 by Dr. 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      Chronic anemia :   ATTR amyloidosis  - check iron studies in AM  - start HEIKE     Hx of Sz d/o:  - on keppra     Chronic A. Fib.     History of endometrial Ca  Obesity  COPD     Active Problems / Assessment AAActive  :   Principal Problem:    ROCIO (acute kidney injury) ([facility] (2020)    Active Problems:    CHF exacerbation ([facility] (2021)         Subjective:   HPI: Dary Malik is a 73 y.o.  female who has been admitted to the hospital for worsening renal failure and anemia. She has an LVAD, HM2, obesity, ATTR amyloidosis, CKD who presented with worsening labs. Cr 7, hgb 6 with outpt labs. She was on HD and CRRT during her last admission and it was decided to make the patient comfort and send her home with hospice. Her renal function recovered. Last Cr was 1.98 in late July of this year. Her most recent outpt labs showed a Cr of 7 as above. No changes per daughter. mentating well. Some tremors. Voiding ok per daughter and pt. On bumex as an outpt.   Has indra cath in place from last admission that was never removed. Past Medical Hx:   Past Medical History:   Diagnosis Date    Asthma     Cancer ([facility] [address]     breast    [facility]     endometrial    Congestive heart failure, unspecified     CRI (chronic renal insufficiency)     Depression     Diabetes [facility]     Hypertension     Severe sepsis with acute organ dysfunction ([facility] 5/11/2021        Past Surgical Hx:     Past Surgical History:   Procedure Laterality Date    HX HERNIA REPAIR      HX HYSTERECTOMY      HX MASTECTOMY      IR INSERT NON TUNL CVC OVER 5 YRS  4/26/2021    IR INSERT TUNL CVC W/O PORT OVER 5 YR  5/5/2021       Medications:  Prior to Admission medications    Medication Sig Start Date End Date Taking? Authorizing Provider   Wheel Chair niurka Daily use due to severe class IV HF symptoms, deconditioning and high fall risk. Pt has severe debility from her heart failure and is not able to ambulate independently. Her mobility limitation impairs her ability to participate in ADLs including toileting, feeding, dressing and bathing. These mobility limitations cannot be resolved with the use of a cane or walker. She is not capable of propelling a standard wheelchair, but is able to propel a lightweight chair. Use of a lightweight wheelchair can resolve her mobility deficits. She has the strength to maneuver a lightweight chair. 8/26/21   Claudia Poole NP   potassium chloride (KLOR-CON) 10 mEq tablet Take 4 Tablets by mouth daily. 7/26/21   Titus Walter NP   magnesium oxide (MAG-OX) 400 mg tablet Take 1 Tablet by mouth daily. 7/26/21   Titus Walter NP   isosorbide mononitrate ER (IMDUR) 30 mg tablet Take 1 Tablet by mouth daily. 7/22/21   Claudia Poole NP   PARoxetine (PAXIL) 20 mg tablet Take 1 Tablet by mouth daily. Indications: anxiousness associated with depression 7/22/21   Carlos Alberto HOFFMAN NP   levETIRAcetam (KEPPRA) 500 mg tablet Take 0.5 Tablets by mouth two (2) times a day.  Take half a tablet two times a day Indications: seizures 7/22/21   Casandra HOFFMAN NP   bumetanide (BUMEX) 1 mg tablet Take 1 Tablet by mouth daily. Additional doses as directed 7/22/21   Finesse Rudolph NP   melatonin 3 mg tablet Take 1 Tablet by mouth nightly. 7/22/21   Finesse Rudolph NP   warfarin (COUMADIN) 3 mg tablet Take 3 mg by mouth daily. 7/13/21   Provider, Historical   cephALEXin (KEFLEX) 500 mg capsule Take 500 mg by mouth daily. 500mg once daily after completing three times daily dose  Patient not taking: Reported on 7/20/2021 7/13/21   Provider, Historical   hydrOXYzine HCL (ATARAX) 10 mg tablet Take 2.5 mg by mouth every six (6) hours as needed for Itching. 6/25/21   Provider, Historical   traZODone (DESYREL) 50 mg tablet Take 25 mg by mouth nightly. Give 1/2 (half) tablet by mouth at bedtime  Indications: insomnia and anxiety  Patient not taking: Reported on 7/2/2021 6/21/21   Provider, Historical   HYDROcodone-acetaminophen (NORCO) 5-325 mg per tablet Take 1 Tablet by mouth every four (4) hours as needed for Pain. Patient not taking: Reported on 4/52/8698 2/4/88   Adele Moyer MD   morphine (ROXANOL) 100 mg/5 mL (20 mg/mL) concentrated solution Take 5 mg by mouth every three (3) hours as needed for Shortness of Breath (Unrelieved by Symbicort). Patient not taking: Reported on 1/3/2098 8/75/45   Adele Moyer MD   simethicone (MYLICON) 40 BJ/0.0 mL drops Take 40 mg by mouth Before breakfast, lunch, dinner and at bedtime. Indications: gas 1/08/06   Adele Moyer MD   loperamide (IMODIUM) 1 mg/5 mL solution Take 2 mg by mouth four (4) times daily as needed for Diarrhea. 0/04/96   Adele Moyer MD   haloperidol (HALDOL) 2 mg/mL oral concentrate Take 1 mg by mouth every six (6) hours as needed for Agitation.   Patient not taking: Reported on 2/4/1303 9/97/96   Adele Moyer MD   prochlorperazine (COMPAZINE) 10 mg tablet Take 10 mg by mouth every six (6) hours as needed for Nausea or Vomiting. Patient not taking: Reported on 8/4/5542 3/90/97   Lynn Crigler, MD   acetaminophen (TYLENOL) 650 mg suppository Insert 650 mg into rectum every six (6) hours as needed for Fever. 7/79/64   Lynn Crigler, MD   hyoscyamine SL (LEVSIN/SL) 0.125 mg SL tablet Take 0.125 mg by mouth every four (4) hours as needed for Secretions. Patient not taking: Reported on 1/9/7299 0/30/92   Lynn Crigler, MD   budesonide-formoteroL [facility]) 160-4.5 mcg/actuation HFAA Take 2 Puffs by inhalation as needed. 5/28/21   Provider, Historical   tafamidis 61 mg cap Take 61 mg by mouth daily. 9/17/79   Lynn Crigler, MD   albuterol (PROVENTIL HFA, VENTOLIN HFA, PROAIR HFA) 90 mcg/actuation inhaler Take 2 Puffs by inhalation every four (4) hours as needed for Wheezing. 0/69/45   Lynn Crigler, MD   hydrALAZINE (APRESOLINE) 25 mg tablet Take 100 mg by mouth three (3) times daily. MAP >03 2/71/80   Fifi Soria MD   levETIRAcetam (KEPPRA) 500 mg tablet Take 250 mg by mouth two (2) times a day. 5/28/21   Provider, Historical       Allergies   Allergen Reactions    Atarax [Hydroxyzine Hcl] Other (comments)     Small doses tolerated. Full dose 10mg causes oversedation.  Benzodiazepines Other (comments)     Respiratory issues       Social Hx:  reports that she has never smoked. She has never used smokeless tobacco. She reports previous alcohol use. No family history on file. Review of Systems:  A twelve point review of system was performed today. Pertinent positives and negatives are mentioned in the HPI. The reminder of the ROS is negative and noncontributory. Objective:    Vitals:    Vitals:    09/02/21 1244 09/02/21 1315   Pulse: (!) 118 (!) 121   Resp: 22 (!) 35   Temp: 97.2 °F (36.2 °C)    SpO2: 93%      I&O's:  No intake/output data recorded.   Visit Vitals  Pulse (!) 121   Temp 97.2 °F (36.2 °C)   Resp (!) 35   SpO2 93%       Physical Exam:  General:Alert, No distress,   HEENT: Eyes are PERRL. Conjunctiva without pallor ,erythema. The sclerae without icterus. .   Neck:Supple,no mass palpable  Lungs : Clears to auscultation Bilaterally, Normal respiratory effort  CVS: RRR, S1 S2 normal, No rub, no LE edema  Abdomen: Soft, Non tender, No hepatosplenomegaly, bowel sounds present  Extremities: No cyanosis, No clubbing  Skin: No rash or lesions.   Lymph nodes: No palpable nodes  MS: No joint swelling, erythema, warmth  Neurologic: non focal, AAO x 3  Psych: normal affect    Laboratory Results:    Lab Results   Component Value Date    BUN 99 (H) 09/02/2021     (L) 09/02/2021    K 5.0 09/02/2021     09/02/2021    CO2 23 09/02/2021       Lab Results   Component Value Date    BUN 99 (H) 09/02/2021    BUN 66 (H) 07/29/2021    BUN 69 (H) 07/22/2021    BUN 23 (H) 05/27/2021    BUN 16 05/26/2021    K 5.0 09/02/2021    K 4.0 07/29/2021    K 3.4 (L) 07/22/2021    K 3.2 (L) 05/27/2021    K 3.9 05/26/2021       Lab Results   Component Value Date    WBC 3.1 (L) 09/02/2021    RBC 2.66 (L) 09/02/2021    HGB 8.1 (L) 09/02/2021    HCT 25.0 (L) 09/02/2021    MCV 94.0 09/02/2021    MCH 30.5 09/02/2021    RDW 18.2 (H) 09/02/2021     (L) 09/02/2021       Lab Results   Component Value Date    PHOS 3.7 05/27/2021       Urine dipstick:   Lab Results   Component Value Date/Time    Color DARK YELLOW 05/22/2021 05:30 AM    Appearance CLOUDY (A) 05/22/2021 05:30 AM    Specific gravity 1.023 05/22/2021 05:30 AM    Specific gravity 1.013 11/09/2020 12:05 AM    pH (UA) 5.0 05/22/2021 05:30 AM    Protein 100 (A) 05/22/2021 05:30 AM    Glucose Negative 05/22/2021 05:30 AM    Ketone Negative 05/22/2021 05:30 AM    Bilirubin Negative 04/18/2021 05:08 PM    Urobilinogen 0.2 05/22/2021 05:30 AM    Nitrites Positive (A) 05/22/2021 05:30 AM    Leukocyte Esterase SMALL (A) 05/22/2021 05:30 AM    Epithelial cells FEW 05/22/2021 05:30 AM    Bacteria 1+ (A) 05/22/2021 05:30 AM    WBC 0-4 05/22/2021 05:30 AM    RBC 0-5 05/22/2021 05:30 AM       I have reviewed the following: All pertinent labs, microbiology data, radiology imaging for my assessment           Thank you for allowing us to participate in the care of this patient. We will follow patient. Please dont hesitate to call with any questions    Rod Reyes MD  9/2/2021        [facility] [address]  Phone - [phone]   Fax - [phone]  [email or web address]

## 2021-09-02 NOTE — ED PROVIDER NOTES
The history is provided by a relative and the patient. Abnormal Lab Results  This is a new problem. The current episode started 12 to 24 hours ago. The problem occurs constantly. The problem has not changed since onset. Associated symptoms comments: Increased swelling, no LVAD alarms. Nothing aggravates the symptoms. Nothing relieves the symptoms. Treatments tried: diuretics, home medications. The treatment provided no relief. Past Medical History:   Diagnosis Date    Asthma     Cancer ([facility]     breast    Cancer ([facility]     endometrial    Congestive heart failure, unspecified     CRI (chronic renal insufficiency)     Depression     Diabetes ([facility]     Hypertension     Severe sepsis with acute organ dysfunction ([facility] 5/11/2021       Past Surgical History:   Procedure Laterality Date    HX HERNIA REPAIR      HX HYSTERECTOMY      HX MASTECTOMY      IR INSERT NON TUNL CVC OVER 5 YRS  4/26/2021    IR INSERT TUNL CVC W/O PORT OVER 5 YR  5/5/2021         No family history on file. Social History     Socioeconomic History    Marital status:      Spouse name: Not on file    Number of children: Not on file    Years of education: Not on file    Highest education level: Not on file   Occupational History    Not on file   Tobacco Use    Smoking status: Never Smoker    Smokeless tobacco: Never Used   Substance and Sexual Activity    Alcohol use: Not Currently    Drug use: Not on file    Sexual activity: Not on file   Other Topics Concern    Not on file   Social History Narrative    Not on file     Social Determinants of Health     Financial Resource Strain:     Difficulty of Paying Living Expenses:    Food Insecurity:     Worried About Running Out of Food in the Last Year:     [address] in the Last Year:    Transportation Needs:     Lack of Transportation (Medical):      Lack of Transportation (Non-Medical):    Physical Activity:     Days of Exercise per Week:     Minutes of Exercise per Session:    Stress:     Feeling of Stress :    Social Connections:     Frequency of Communication with Friends and Family:     Frequency of Social Gatherings with Friends and Family:     Attends Religion Services:     Active Member of Clubs or Organizations:     Attends Club or Organization Meetings:     Marital Status:    Intimate Partner Violence:     Fear of Current or Ex-Partner:     Emotionally Abused:     Physically Abused:     Sexually Abused: ALLERGIES: Atarax [hydroxyzine hcl] and Benzodiazepines    Review of Systems   All other systems reviewed and are negative. Vitals:    09/02/21 1244   Pulse: (!) 118   Resp: 22   Temp: 97.2 °F (36.2 °C)   SpO2: 93%            Physical Exam  Vitals and nursing note reviewed. Constitutional:       General: She is not in acute distress. Appearance: She is well-developed. She is obese. She is ill-appearing. HENT:      Head: Normocephalic and atraumatic. Eyes:      Conjunctiva/sclera: Conjunctivae normal.   Cardiovascular:      Rate and Rhythm: Regular rhythm. Tachycardia present. Pulmonary:      Effort: Pulmonary effort is normal. No respiratory distress. Abdominal:      General: There is no distension. Musculoskeletal:         General: No deformity. Normal range of motion. Cervical back: Neck supple. Comments: Chronic lymphedema LUE. Chronic b/l LE nonpitting edema   Skin:     General: Skin is warm and dry. Neurological:      Mental Status: She is alert. Cranial Nerves: No cranial nerve deficit. Psychiatric:         Mood and Affect: Mood normal. Affect is flat. Behavior: Behavior normal.          MDM     73 y.o. female with history of LVAD presents with recurrent renal failure and volume overload. Nephrology is evaluating and discussing options, heart failure team will follow up with her here for discussion of ongoing management. Procedures    EKG 1259: Rate 117. Ventricular paced rhythm. No ST segment or T wave abnormalities. Since last tracing rate faster. Otherwise no significant change. Perfect Serve Consult for Admission  2:00 PM    ED Room Number: ER03/03  Patient Name and age:  Guicho Cortes 73 y.o.  female  Working Diagnosis:   1. Acute renal failure superimposed on stage 4 chronic kidney disease, unspecified acute renal failure type ([facility] [address]    2.  Acute on chronic systolic (congestive) heart failure ([facility]        COVID-19 Suspicion:  no  Sepsis present:  no  Reassessment needed: no  Code Status:  Do Not Resuscitate  Readmission: no  Isolation Requirements:  no  Recommended Level of Care:  step down  Department:[facility] Adult ED - 21

## 2021-09-03 NOTE — PROGRESS NOTES
[facility]   [address] [facility], [facility], [facility]  Phone: [phone]   [phone]       Nephrology Progress Note  Farrukh Smith     1950     [id number]  Date of Admission : 9/2/2021 09/03/21    CC: Follow up for ROCIO       Assessment and Plan   ROCIO on CKD:  -Etiology to be determined. -Differential diagnosis includes prerenal azotemia from a Bumex use, possible line infection from a permacath and resultant bacteremia, possible ATN  -Nonoliguric with IV fluids and creatinine trending down  -Continue to hold diuretics  -Reduce the rate of IV fluids to 50 cc/h    Right IJ permacath. -This is being unused since July 2021.  -This is unacceptable to leave a permacath in the patient who is no longer on hospice and has LVAD with the potential risk for bloodstream infections.  -Ordered peripheral blood cultures. -If patient develops any signs and symptoms of sepsis, night line needs to be removed immediately.     CKD IV:  - baseline Cr around 2 at baseline     Chronic systolic CHF, stage C NYHA class IV  Combined ischemic and nonischemic cardiomyopathy  S/p HM 2 LVAD implant 4/15/2017 by Dr. 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      Chronic anemia :   ATTR amyloidosis  - check iron studies  -On HEIKE     Hx of Sz d/o:  - on keppra     Chronic A. Fib.     History of endometrial Ca  Obesity  COPD     Interval History:  Daughter reports slight worsening of left upper extremity edema prior to this admission. When asked the daughter about right IJ permacath, she reported that permacath was never removed when she left the hospital in May as she was going on hospice and hospice team requested permacath for blood draws. She reports that patient was discharged from hospice care when her creatinine improved to 1.9 mg/dL in July. However permacath was not removed at the time. Patient denied any fevers. Has fungal UTI. Daughter reports fungal infection in the groin. She reports an open wound in the back. Review of Systems: A comprehensive review of systems was negative. Current Medications:   Current Facility-Administered Medications   Medication Dose Route Frequency    0.9% sodium chloride infusion 250 mL  250 mL IntraVENous PRN    calcium gluconate 2 g/100 mL sodium chloride (ISO-OSM)  2 g IntraVENous ONCE    warfarin (COUMADIN) tablet 3 mg  3 mg Oral ONCE    fluconazole (DIFLUCAN) tablet 100 mg  100 mg Oral DAILY    sodium chloride (NS) flush 5-40 mL  5-40 mL IntraVENous Q8H    sodium chloride (NS) flush 5-40 mL  5-40 mL IntraVENous PRN    acetaminophen (TYLENOL) tablet 650 mg  650 mg Oral Q6H PRN    Or    acetaminophen (TYLENOL) suppository 650 mg  650 mg Rectal Q6H PRN    ondansetron (ZOFRAN ODT) tablet 4 mg  4 mg Oral Q8H PRN    Or    ondansetron (ZOFRAN) injection 4 mg  4 mg IntraVENous Q6H PRN    senna (SENOKOT) tablet 8.6 mg  1 Tablet Oral DAILY PRN    levETIRAcetam (KEPPRA) tablet 250 mg  250 mg Oral BID    PARoxetine (PAXIL) tablet 20 mg  20 mg Oral DAILY    Warfarin - pharamcy to dose   Other Rx Dosing/Monitoring    0.9% sodium chloride infusion  100 mL/hr IntraVENous CONTINUOUS    cephALEXin (KEFLEX) capsule 500 mg  500 mg Oral BID    hydrALAZINE (APRESOLINE) tablet 100 mg  100 mg Oral TID    tafamidis cap 61 mg (Patient Supplied)  61 mg Oral DAILY      Allergies   Allergen Reactions    Atarax [Hydroxyzine Hcl] Other (comments)     Small doses tolerated. Full dose 10mg causes oversedation.      Benzodiazepines Other (comments)     Respiratory issues       Objective:  Vitals:    Vitals:    09/03/21 0559 09/03/21 0752 09/03/21 0915 09/03/21 1000   BP:   (!) 78/0    Pulse: 90 89  87   Resp:  20     Temp:  97.7 °F (36.5 °C)     SpO2:  92%     Weight:   114.8 kg (253 lb)    Height:   5' 7\" (1.702 m)      Intake and Output:  09/03 0701 - 09/03 1900  In: -   Out: 50 [Urine:50]  09/01 1901 - 09/03 0700  In: 1000 [I.V.:1000]  Out: 350 [Urine:350]    Physical Examination:    General: Obese   Neck:  Supple, no mass  Resp:  Lungs CTA B/L, no wheezing , normal respiratory effort  CV:  VAD sounds,  no murmur or rub, no LE edema  GI:  Soft, NT, + Bowel sounds, no hepatosplenomegaly  Neurologic:  Non focal  Psych:             AAO x 3 appropriate affect   Skin:  No Rash  :  Fletcher +    []    High complexity decision making was performed  []    Patient is at high-risk of decompensation with multiple organ involvement    Lab Data Personally Reviewed: I have reviewed all the pertinent labs, microbiology data and radiology studies during assessment.     Recent Labs     09/03/21 0419 09/02/21  1530 09/02/21  1256 09/01/21  0000     --  133*  --    K 4.2  --  5.0  --    *  --  104  --    CO2 20*  --  23  --    GLU 77  --  91  --    BUN 97*  --  99*  --    CREA 7.12*  --  7.38*  --    CA 6.4*  --  7.3*  --    MG  --   --  2.6*  --    ALB  --   --  1.9*  --    ALT  --   --  <6*  --    INR 2.4* 2.4*  --  3.4     Recent Labs     09/03/21  0606 09/03/21  0419 09/02/21  1256   WBC  --  1.9* 3.1*   HGB 6.4* 6.3* 8.1*   HCT 20.5* 20.1* 25.0*   PLT  --  109* 140*     No results found for: SDES  Lab Results   Component Value Date/Time    Culture result: CANDIDA ALBICANS (A) 05/13/2021 11:29 AM    Culture result: RARE DIPHTHEROIDS (A) 05/12/2021 04:29 AM    Culture result: LIGHT YEAST, (APPARENT CANDIDA ALBICANS) (A) 05/11/2021 07:09 PM    Culture result: LIGHT NORMAL RESPIRATORY BETO 05/11/2021 07:09 PM    Culture result: NO GROWTH 5 DAYS 05/11/2021 04:30 PM     Recent Results (from the past 24 hour(s))   CBC WITH AUTOMATED DIFF    Collection Time: 09/02/21 12:56 PM   Result Value Ref Range    WBC 3.1 (L) 3.6 - 11.0 K/uL    RBC 2.66 (L) 3.80 - 5.20 M/uL    HGB 8.1 (L) 11.5 - 16.0 g/dL    HCT 25.0 (L) 35.0 - 47.0 %    MCV 94.0 80.0 - 99.0 FL    MCH 30.5 26.0 - 34.0 PG    MCHC 32.4 30.0 - 36.5 g/dL    RDW 18.2 (H) 11.5 - 14.5 %    PLATELET 637 (L) 987 - 400 K/uL    MPV 9.4 8.9 - 12.9 FL    NRBC 0.0 0  WBC    ABSOLUTE NRBC 0.00 0.00 - 0.01 K/uL    NEUTROPHILS 83 (H) 32 - 75 %    LYMPHOCYTES 11 (L) 12 - 49 %    MONOCYTES 4 (L) 5 - 13 %    EOSINOPHILS 0 0 - 7 %    BASOPHILS 0 0 - 1 %    IMMATURE GRANULOCYTES 2 (H) 0.0 - 0.5 %    ABS. NEUTROPHILS 2.6 1.8 - 8.0 K/UL    ABS. LYMPHOCYTES 0.3 (L) 0.8 - 3.5 K/UL    ABS. MONOCYTES 0.1 0.0 - 1.0 K/UL    ABS. EOSINOPHILS 0.0 0.0 - 0.4 K/UL    ABS. BASOPHILS 0.0 0.0 - 0.1 K/UL    ABS. IMM. GRANS. 0.1 (H) 0.00 - 0.04 K/UL    DF SMEAR SCANNED      RBC COMMENTS ANISOCYTOSIS  1+        RBC COMMENTS OVALOCYTES  PRESENT       METABOLIC PANEL, COMPREHENSIVE    Collection Time: 09/02/21 12:56 PM   Result Value Ref Range    Sodium 133 (L) 136 - 145 mmol/L    Potassium 5.0 3.5 - 5.1 mmol/L    Chloride 104 97 - 108 mmol/L    CO2 23 21 - 32 mmol/L    Anion gap 6 5 - 15 mmol/L    Glucose 91 65 - 100 mg/dL    BUN 99 (H) 6 - 20 MG/DL    Creatinine 7.38 (H) 0.55 - 1.02 MG/DL    BUN/Creatinine ratio 13 12 - 20      GFR est AA 7 (L) >60 ml/min/1.73m2    GFR est non-AA 5 (L) >60 ml/min/1.73m2    Calcium 7.3 (L) 8.5 - 10.1 MG/DL    Bilirubin, total 0.6 0.2 - 1.0 MG/DL    ALT (SGPT) <6 (L) 12 - 78 U/L    AST (SGOT) 19 15 - 37 U/L    Alk. phosphatase 83 45 - 117 U/L    Protein, total 7.7 6.4 - 8.2 g/dL    Albumin 1.9 (L) 3.5 - 5.0 g/dL    Globulin 5.8 (H) 2.0 - 4.0 g/dL    A-G Ratio 0.3 (L) 1.1 - 2.2     SAMPLES BEING HELD    Collection Time: 09/02/21 12:56 PM   Result Value Ref Range    SAMPLES BEING HELD 1RED     COMMENT        Add-on orders for these samples will be processed based on acceptable specimen integrity and analyte stability, which may vary by analyte.    MAGNESIUM    Collection Time: 09/02/21 12:56 PM   Result Value Ref Range    Magnesium 2.6 (H) 1.6 - 2.4 mg/dL   EKG, 12 LEAD, INITIAL    Collection Time: 09/02/21 12:59 PM   Result Value Ref Range    Ventricular Rate 117 BPM    Atrial Rate 117 BPM    QRS Duration 202 ms    Q-T Interval 430 ms    QTC Calculation (Bezet) 599 ms    Calculated R Axis -73 degrees    Calculated T Axis 88 degrees    Diagnosis       Ventricular-paced rhythm    When compared with ECG of 26-MAY-2021 13:37,  Vent.  rate has increased BY  46 BPM  Confirmed by Charla Cordoba M.D., Mraio Chacon ([id number]) on 9/2/2021 3:27:31 PM     NT-PRO BNP    Collection Time: 09/02/21  2:45 PM   Result Value Ref Range    NT pro-BNP >35,000 (H) <125 PG/ML   PROTHROMBIN TIME + INR    Collection Time: 09/02/21  3:30 PM   Result Value Ref Range    INR 2.4 (H) 0.9 - 1.1      Prothrombin time 24.0 (H) 9.0 - 11.1 sec   URINALYSIS W/ REFLEX CULTURE    Collection Time: 09/02/21  5:00 PM    Specimen: Urine   Result Value Ref Range    Color PINK      Appearance CLOUDY (A) CLEAR      Specific gravity 1.013 1.003 - 1.030      pH (UA) 5.0 5.0 - 8.0      Protein 300 (A) NEG mg/dL    Glucose Negative NEG mg/dL    Ketone Negative NEG mg/dL    Bilirubin Negative NEG      Blood LARGE (A) NEG      Urobilinogen 0.2 0.2 - 1.0 EU/dL    Nitrites Negative NEG      Leukocyte Esterase SMALL (A) NEG      WBC 0-4 0 - 4 /hpf    RBC 20-50 0 - 5 /hpf    Epithelial cells FEW FEW /lpf    Bacteria Negative NEG /hpf    UA:UC IF INDICATED CULTURE NOT INDICATED BY UA RESULT CNI      Yeast PRESENT (A) NEG      Budding yeast PRESENT (A) NEG      Yeast w/hyphae PRESENT (A) NEG     METABOLIC PANEL, BASIC    Collection Time: 09/03/21  4:19 AM   Result Value Ref Range    Sodium 136 136 - 145 mmol/L    Potassium 4.2 3.5 - 5.1 mmol/L    Chloride 109 (H) 97 - 108 mmol/L    CO2 20 (L) 21 - 32 mmol/L    Anion gap 7 5 - 15 mmol/L    Glucose 77 65 - 100 mg/dL    BUN 97 (H) 6 - 20 MG/DL    Creatinine 7.12 (H) 0.55 - 1.02 MG/DL    BUN/Creatinine ratio 14 12 - 20      GFR est AA 7 (L) >60 ml/min/1.73m2    GFR est non-AA 6 (L) >60 ml/min/1.73m2    Calcium 6.4 (LL) 8.5 - 10.1 MG/DL   CBC W/O DIFF    Collection Time: 09/03/21  4:19 AM   Result Value Ref Range    WBC 1.9 (L) 3.6 - 11.0 K/uL    RBC 2.11 (L) 3.80 - 5.20 M/uL    HGB 6.3 (L) 11.5 - 16.0 g/dL    HCT 20.1 (L) 35.0 - 47.0 %    MCV 95.3 80.0 - 99.0 FL    MCH 29.9 26.0 - 34.0 PG    MCHC 31.3 30.0 - 36.5 g/dL    RDW 18.1 (H) 11.5 - 14.5 %    PLATELET 074 (L) 845 - 400 K/uL    MPV 9.4 8.9 - 12.9 FL    NRBC 0.0 0  WBC    ABSOLUTE NRBC 0.00 0.00 - 0.01 K/uL   PROTHROMBIN TIME + INR    Collection Time: 09/03/21  4:19 AM   Result Value Ref Range    INR 2.4 (H) 0.9 - 1.1      Prothrombin time 24.2 (H) 9.0 - 11.1 sec   HGB & HCT    Collection Time: 09/03/21  6:06 AM   Result Value Ref Range    HGB 6.4 (L) 11.5 - 16.0 g/dL    HCT 20.5 (L) 35.0 - 47.0 %   TYPE & SCREEN    Collection Time: 09/03/21  6:06 AM   Result Value Ref Range    Crossmatch Expiration 09/06/2021,2359     ABO/Rh(D) Marcella Isbell NEGATIVE     Antibody screen POS     Antibody ID PENDING    RBC, ALLOCATE    Collection Time: 09/03/21  7:45 AM   Result Value Ref Range    HISTORY CHECKED? Historical check performed    ECHO ADULT COMPLETE    Collection Time: 09/03/21  9:28 AM   Result Value Ref Range    IVSd 0.79 0.60 - 0.90 cm    LVIDd 6.76 (A) 3.90 - 5.30 cm    LVIDs 5.72 cm    LVOT d 2.28 cm    LVPWd 1.02 (A) 0.60 - 0.90 cm    RVIDd 6.66 cm    Left Atrium Major Axis 4.27 cm    LA Volume 115.00 22.0 - 52.0 mL    LA Area 4C 28.81 cm2    LA Vol 2C 114.55 (A) 22.00 - 52.00 mL    LA Vol 4C 91.65 (A) 22.00 - 52.00 mL    Pulmonic Valve Systolic Peak Instantaneous Gradient 4.71 mmHg    Pulmonic Valve Max Velocity 108.51 cm/s    Tapse 1.91 1.50 - 2.00 cm    Triscuspid Valve Regurgitation Peak Gradient 38.55 mmHg    TR Max Velocity 310.45 cm/s    Ao Root D 2.94 cm    LV Mass .3 67.0 - 162.0 g    LV Mass AL Index 119.8 43.0 - 95.0 g/m2    Left Atrium Minor Axis 1.91 cm    LA Vol Index 51.57 16.00 - 28.00 ml/m2    LA Vol Index 51.37 16.00 - 28.00 ml/m2    LA Vol Index 41.10 16.00 - 28.00 ml/m2           Total time spent with patient:  xxx   min. Care Plan discussed with:  Patient     Family      RN      Consulting Physician [id number] [facility],         I have reviewed the flowsheets. Chart and Pertinent Notes have been reviewed. No change in PMH ,family and social history from Consult note.       Karuna Escobar MD

## 2021-09-03 NOTE — PROGRESS NOTES
0730: Bedside shift change report given to Mello Pacheco (oncoming nurse) by [facility]  (offgoing nurse). Report included the following information SBAR, Kardex, ED Summary, Intake/Output, MAR and Recent Results. 0754: Pt had seven runs of vtach. Perfect served Dr. Cal Pablo. 65: Orders provided. Adv HF made aware. 1925: LVAD dressing changed. 1930: Bedside shift change report given to Nely (oncoming nurse) by Mello Pacheco (offgoing nurse). Report included the following information SBAR, Kardex, ED Summary, Procedure Summary, Intake/Output, MAR and Recent Results.

## 2021-09-03 NOTE — NURSE NAVIGATOR
Chart reviewed by Heart Failure Nurse Navigator. Heart Failure database completed. EF:  pending     ACEi/ARB/ARNi: Per [facility] note 4/15/21 contraindicated     BB: Per [facility] note 4/15/21 contraindictated     Aldosterone Antagonist :Per [facility] note 4/15/21 contraindicated    Obstructive Sleep Apnea Screening: Yes/triology   STOP-BANG score:   Referred to Sleep Medicine:     CRT  BIV-ICD    NYHA Functional Class IV      Heart Failure Teach Back in Patient Education. Heart Failure Avoiding Triggers on Discharge Instructions. Cardiologist: [facility]      Post discharge follow up phone call to be made within 48-72 hours of discharge.

## 2021-09-03 NOTE — PROGRESS NOTES
[facility] in [place], [facility] Note    Patient name: Karen Suarez  Patient : 1950  Patient MRN: [id number]  Date of service: 21    Primary care physician: Genny Ken NP  Primary general cardiologist:    Primary [facility] cardiologist: Diane Segura MD      CHIEF COMPLAINT:    Chief Complaint   Patient presents with    Abnormal Lab Results        PLAN:  Admitted with ROCIO on CKD, suspect due to prerenal azotemia vs septic ATN   Recommend Palliative Care consultation to clarify goals and limits of care; patient previously enrolled in Hospice and had voiced wishes to avoid hospitalization or invasive procedures  Clarification of code status per primary team     Continue LVAD at current speed, TTE pending to evaluate for vegetations   Continue NS per nephrology   Management of infection per attending; cultures pending   No BB due to RV failure   No ACEi/ARB/ARNI/MRA due to renal failure and ATTR   Continue hydralazine 100 mg PO TID   Cont Tafamidis 61mg daily  HF labs  US of LUE d/t unilateral edema   Strict I/O, daily weights     Remainder of care per primary.      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 Joshua Valera at [facility])  C/b delayed extubation due to severe COPD  C/b critical illness polyneuropathy  C/b prolonged hospitalization post-LVAD, 55 days  C/b sternal wound infection s/p debridement (by Dr. Joshua Valera) s/p wound vac  C/b sacral ulcer  Would culture positive for Staph aureus, not MRSA  C/b LVAD site drainage, improved  S/p CRT-ICD  ICD fired due to electrolyte imbalance (2011)  H/o breast cancer (1992)   s/p bilateral mastectomy/chemo and endometrial cancer s/p hysterectomy  Lymphedema of LLE due to cancer treatment  Severe COPD with FEV1 50%  Depression  Atrial fibrillation  H/o \"two mini strokes\"  S/p fall with hip facture   Right hip hemmiarthroplasty (5/23/18) by Dr. Gwynne Alpers)  S/p removed hip hardwarare due to pain (4/15/19)  COPD severe  CKD, stage 4  Pulmonary hypertension  Cardiac risk factors: Morbid obesity, Body mass index is 42.29 kg/m². DM2 insulin dependent  JED on Trilogy  HL  Urinary incontinence, severe  no procedures due to anticoagulation  Endometrial cancer (2002)  HTN  HL  DNR/DNI  Sepsis        CARDIAC IMAGING:  TTE 5/11/21 LVEF < 15%, LVIDd 5.96cm, TAPSE 0.99cm, RVIDd 4.14cm  TTE 4/19 shows improved LVIDd, 6.68 cm with RVIDd 5.14 cm and TAPSE 1.1 cm   TTE 4/28/21 LVIDd 7.37cm, RVIDd 5.27cm, TAPSE 1.44cm   Echo (9/24/19) LVEF 20-25%, AV opens 1:1, no AR  Echo (5/29/18) LVEF 10-%, ramps study done, report in [facility]  Echo (1/9/18) ramp study done, LVEDD 7.1cm  LHC (11/9/18) 2 vessel disease with 90% OM, 80% PDA, DSA to PDA branch  RHC 4/20 showed adequate Sal CI 3.0 but severely elevated RA pressure 24 mmHg  CXR negative 4/27/21    HEMODYNAMICS:  RHC not done  CPEST not done  6MW not done    OTHER IMAGING:  CXR  CT chest not done    HISTORY OF PRESENT ILLNESS:  I had the pleasure of seeing Edmund Steve in [address] [place] [address] at [facility] in [place]. Briefly, Edmund Steve is a 73 y.o. female with h/o LVAD implant with HM 2 and ESRD requiring HD (now off HD). She had some recent hospitalizations and additional complications and ultimately decided to discharge home with hospice support. Her UOP increased and repeat labs at home showed improved renal function, and she was discharged from Hospice services for not meeting criteria.   She is still at home, living with her daughter, now with home health services in place instead of hospice. Routine lab surveillance revealed ROCIO on CKD, Cr > 7 and Hgb 6. She was asked to present to ER for further assessment as her goals of care were not entirely clear and had been discharged from hospice. REVIEW OF SYSTEMS:  Review of Systems   Constitutional: Positive for malaise/fatigue. Negative for chills, fever and weight loss. Respiratory: Negative for cough. Cardiovascular: Negative for chest pain, palpitations and leg swelling. Gastrointestinal: Negative for constipation, diarrhea, nausea and vomiting. Musculoskeletal:        Arm swelling   Neurological: Negative for dizziness and focal weakness. PHYSICAL EXAM:  Visit Vitals  BP (!) 78/0   Pulse 86   Temp 97.4 °F (36.3 °C)   Resp 16   Ht 5' 7\" (1.702 m)   Wt 253 lb (114.8 kg)   SpO2 94%   BMI 39.63 kg/m²     Physical Exam  Constitutional:       Appearance: She is obese. HENT:      Head: Normocephalic and atraumatic. Eyes:      Conjunctiva/sclera: Conjunctivae normal.      Pupils: Pupils are equal, round, and reactive to light. Cardiovascular:      Comments: LVAD Humm noted on auscultation  Pulmonary:      Effort: Pulmonary effort is normal. No respiratory distress. Breath sounds: Rales present. Abdominal:      General: Bowel sounds are normal. There is no distension. Palpations: Abdomen is soft. Tenderness: There is no abdominal tenderness. Musculoskeletal:         General: Swelling present. Cervical back: Neck supple. Right lower le+ Pitting Edema present. Left lower le+ Pitting Edema present. Skin:     General: Skin is warm and dry. Capillary Refill: Capillary refill takes less than 2 seconds. Neurological:      General: No focal deficit present. Mental Status: She is alert and oriented to person, place, and time.           PAST MEDICAL HISTORY:  Past Medical History:   Diagnosis Date    Asthma     Cancer ([facility]     breast    Cancer ([facility]     endometrial    Congestive heart failure, unspecified     CRI (chronic renal insufficiency)     Depression     Diabetes ([facility]     Hypertension     Severe sepsis with acute organ dysfunction ([facility] 5/11/2021       PAST SURGICAL HISTORY:  Past Surgical History:   Procedure Laterality Date    HX HERNIA REPAIR      HX HYSTERECTOMY      HX MASTECTOMY      IR INSERT NON TUNL CVC OVER 5 YRS  4/26/2021    IR INSERT TUNL CVC W/O PORT OVER 5 YR  5/5/2021       FAMILY HISTORY:  No family history on file. SOCIAL HISTORY:  Social History     Socioeconomic History    Marital status:      Spouse name: Not on file    Number of children: Not on file    Years of education: Not on file    Highest education level: Not on file   Tobacco Use    Smoking status: Never Smoker    Smokeless tobacco: Never Used   Substance and Sexual Activity    Alcohol use: Not Currently     Social Determinants of Health     Financial Resource Strain:     Difficulty of Paying Living Expenses:    Food Insecurity:     Worried About Running Out of Food in the Last Year:     Ran Out of Food in the Last Year:    Transportation Needs:     Lack of Transportation (Medical):     Lack of Transportation (Non-Medical):    Physical Activity:     Days of Exercise per Week:     Minutes of Exercise per Session:    Stress:     Feeling of Stress :    Social Connections:     Frequency of Communication with Friends and Family:     Frequency of Social Gatherings with Friends and Family:     Attends [employer] Services: Active Member of Clubs or Organizations:     Attends Club or Organization Meetings:     Marital Status:        LABORATORY RESULTS:  Labs Latest Ref Rng & Units 9/3/2021 9/3/2021 9/2/2021 7/29/2021 7/22/2021   WBC 3.6 - 11.0 K/uL - 1. 9(L) 3. 1(L) 2. 6(L) 1.8(LL)   RBC 3.80 - 5.20 M/uL - 2.11(L) 2.66(L) 3.20(L) 3.19(L)   Hemoglobin 11.5 - 16.0 g/dL 6. 4(L) 6. 3(L) 8. 1(L) 9.1(L) 9.1(L)   Hematocrit 35.0 - 47.0 % 20. 5(L) 20. 1(L) 25. 0(L) 28. 6(L) 28. 3(L)   MCV 80.0 - 99.0 FL - 95.3 94.0 89 89   Platelets 914 - 210 K/uL - 109(L) 140(L) 150 152   Lymphocytes 12 - 49 % - - 11(L) - -   Monocytes 5 - 13 % - - 4(L) 7 -   Eosinophils 0 - 7 % - - 0 0 -   Basophils 0 - 1 % - - 0 1 -   Albumin 3.5 - 5.0 g/dL - - 1. 9(L) 2. 5(L) 2. 7(L)   Calcium 8.5 - 10.1 MG/DL - 6. 4(LL) 7. 3(L) 7. 8(L) 8.2(L)   Glucose 65 - 100 mg/dL - 77 91 164(H) 105(H)   BUN 6 - 20 MG/DL - 97(H) 99(H) 66(H) 69(H)   Creatinine 0.55 - 1.02 MG/DL - 7.12(H) 7.38(H) 1.98(H) 2.18(H)   Sodium 136 - 145 mmol/L - 136 133(L) 135 138   Potassium 3.5 - 5.1 mmol/L - 4.2 5.0 4.0 3.4(L)    - 226 IU/L - - - - 297(H)   Some recent data might be hidden       ALLERGY:  Allergies   Allergen Reactions    Atarax [Hydroxyzine Hcl] Other (comments)     Small doses tolerated. Full dose 10mg causes oversedation.      Benzodiazepines Other (comments)     Respiratory issues        CURRENT MEDICATIONS:    Current Facility-Administered Medications:     warfarin (COUMADIN) tablet 3 mg, 3 mg, Oral, ONCE, Forrest Amaya MD    fluconazole (DIFLUCAN) tablet 100 mg, 100 mg, Oral, DAILY, Forrest Amaya MD, 100 mg at 09/03/21 1151    sodium chloride (NS) flush 5-40 mL, 5-40 mL, IntraVENous, Q8H, Arti Ramon MD, 10 mL at 09/03/21 1658    sodium chloride (NS) flush 5-40 mL, 5-40 mL, IntraVENous, PRN, Forrest Amaya MD    acetaminophen (TYLENOL) tablet 650 mg, 650 mg, Oral, Q6H PRN, 650 mg at 09/03/21 1324 **OR** acetaminophen (TYLENOL) suppository 650 mg, 650 mg, Rectal, Q6H PRN, Arti Naranjo MD    ondansetron (ZOFRAN ODT) tablet 4 mg, 4 mg, Oral, Q8H PRN **OR** ondansetron (ZOFRAN) injection 4 mg, 4 mg, IntraVENous, Q6H PRN, Forrest Amaya MD    senna (SENOKOT) tablet 8.6 mg, 1 Tablet, Oral, DAILY PRN, Jovi Zhang, MD Arti    levETIRAcetam (KEPPRA) tablet 250 mg, 250 mg, Oral, BID, Forrest Amaya MD, 250 mg at 09/03/21 1006    PARoxetine (PAXIL) tablet 20 mg, 20 mg, Oral, DAILY, Delilah Martinez MD, 20 mg at 09/03/21 1006    Warfarin - pharamcy to dose, , Other, Rx Dosing/Monitoring, Delilah Martinez MD    0.9% sodium chloride infusion, 50 mL/hr, IntraVENous, CONTINUOUS, Maikel Griffin, Jaspreet ABBASI MD, Last Rate: 50 mL/hr at 09/03/21 1213, 50 mL/hr at 09/03/21 1213    cephALEXin (KEFLEX) capsule 500 mg, 500 mg, Oral, BID, Rosanna Jones MD, 500 mg at 09/03/21 0002    hydrALAZINE (APRESOLINE) tablet 100 mg, 100 mg, Oral, TID, Rosanna Jones MD, 100 mg at 09/03/21 1658    tafamidis cap 61 mg (Patient Supplied), 61 mg, Oral, DAILY, Rosanna Jones MD, 61 mg at 09/03/21 1991 [facility], [address], [address]  Phone: [phone]  Fax: [phone]    PATIENT CARE TEAM:  Patient Care Team:  Nicola Porras NP as PCP - General (Nurse Practitioner)  Delvin Melendez MD (Cardiology)  Partha Castillo MD as Physician (Cardiology)  Levy Rao MD (Cardiothoracic Surgery)  Daniel Dodge MD (Cardiology)     [facility] ATTENDING ADDENDUM    Patient was seen and examined in person. Data and notes were reviewed. I have discussed and agree with the plan as noted in the NP note above without further additions.     Daphnie Nicole MD PhD  [place]

## 2021-09-03 NOTE — WOUND CARE
WOCN Note:     New consult for groin, pannus, sacrum, BLE    Chart shows:  Admitted for acute renal failure with overload  History of LVAD, DM, CHF    Assessment:   Appropriately conversational and and reports no pain. Assists in repositioning onto right for incont care of small smear of soft stool. Suface: garret gel mattress    Daughter at bedside. Bilateral heels intact and without erythema. Heels offloaded with pillows. Generalized edema to lower legs and feet. 1. POA left lower medial leg ulcer  1 x 1 x 0.3 cm  100% red with slight bleeding     2. POA right lower lateral leg ulcer  0.6 x 0.4 x 0.1 cm  100% red  no exudate     Tx: cleaned both leg wounds with saline, applied petrolatum and foam dressing  Other scabbed areas to her legs that are dry and left open to air. 3. POA sacrum and buttocks MASD  8 x 8 cm field with sawtooth ragged appearance with bleeding pinpoints  Barrier cream in use  Daughter reports dressings get dirty quickly with stool and they both prefer just cream    4. POA left groin erosion from unknow source but is irregularly shaped like a ruptured bullae may present  5 x 3 x 0.1 cm  100% moist red  no exudate  Periwound intact & without erythema; distant redness under pannus on left side but no rash noted  Tx: cleaned with saline, applied silver foam dressing    Wound Recommendations:    Left groin: clean with saline and cover with silver foam dressing (left in room and daughter is aware)  Lower legs: clean with saline, apply petrolatum and foam cover. Change every 3 days. PI Prevention:  Turn/reposition approximately every 2 hours  Offload heels with heels hanging off end of pillow at all times while in bed.     Z-guard cream to buttocks and sacrum daily and as needed with incontinence care    Transition of Care: Plan to follow weekly and as needed while admitted to hospital.     Margoth Hernandez, HEMAN, RN, [address]  Certified Wound, Ostomy, Continence Nurse  office [phone]  Available via [employer]

## 2021-09-03 NOTE — PROGRESS NOTES
Pharmacist Note  Warfarin Dosing  Consult provided for this 73 y. o.female to manage warfarin for LVAD    INR Goal:1.8-2.5 (2/2 GIB)    Home regimen/ tablet size: 3 mg daily    Drugs that may increase INR: None  Drugs that may decrease INR: None  Other current anticoagulants/ drugs that may increase bleeding risk: None  Risk factors: Age > 65, dialysis  Daily INR ordered: YES    Recent Labs     09/03/21  0606 09/03/21  0419 09/02/21  1530 09/02/21  1256 09/01/21  0000   HGB 6.4* 6.3*  --  8.1*  --    INR  --  2.4* 2.4*  --  3.4     Date               INR                  Dose  9/2   2.4     3 mg   9/3                  2.4                   3 mg                                                                                Assessment/ Plan: Will order warfarin 3 mg PO x 1 dose. Pharmacy will continue to monitor daily and adjust therapy as indicated. Please contact the pharmacist at   for outpatient recommendations if needed.

## 2021-09-03 NOTE — PROGRESS NOTES
9/3/21  1:38 PM    Care Management Initial Evaluation    CM reviewed EMR and spoke with patients daughter over the phone to complete the initial evaluation. Confirmed charted demographics. Patient had a hospital stay from 5/11-5/28/21 and was discharged home with [facility]- she remained on hospice until 7/17/21 when she was discharged. She is now open with [facility] for PT and Skilled nursing. IPR HX: DEBORAH from 5/6-5/11/21    Reason for Admission:   CHF exacerbation                 RUR Score:    40% HIGH      PCP: First and Last name:  Natalie Flannery NP     Name of Practice:   Are you a current patient: Yes/No:  Yes, Saw within the last two weeks   Approximate date of last visit:    Can you do a virtual visit with your PCP:              Resources/supports as identified by patient/family:   Patients daughter lives with patient- she started living with her when she discharged from the hospital in May, 2021                Top Challenges facing patient (as identified by patient/family and CM): Finances/Medication cost?   Has medication coverage                 Transportation? Patient's daughter transports her to all of her appointments              Support system or lack thereof? Good support system- her daughter is living with her. May eventually move her to her home in [address] arrangements? One story home with no entry steps            Self-care/ADLs/Cognition?   Had been able to transfer and ambulate- was working with [address] and was discharged from [address]:  Full Code      Healthcare Decision Maker:   Click here to complete [address] including selection of the Healthcare Decision Maker Relationship (ie \"Primary\")      Primary Decision Maker: Paulie Jerry - Daughter - [phone]    Payor Source Payor: Wilmer Garcia / Plan: Pascal Aase / Product Type: Medicare / Plan for utilizing home health:   Patient open with [facility] for PT and Skilled Nursing-- will need a resumption order at dc                  Transition of Care Plan:    DME:  Patient has a RW, Wheelchair, Trilogy machine, [facility] and a hospital bed    1). Admitted for medica management  2). Open with [facility]- will need a resumption order  3). Family plans to transport at dc unless patient becomes unable to ride in a car and needs transport arranged  4). PCP follow up- patients daughter requested to schedule the appointment herself as she has to coordinate with her work schedule  4). CM will follow for dc needs    Care Management Interventions  PCP Verified by CM: Yes (within the last two weeks)  Mode of Transport at Discharge:  Other (see comment)  Transition of Care Consult (CM Consult): Discharge Planning  Discharge Durable Medical Equipment: No  Physical Therapy Consult: No  Occupational Therapy Consult: No  Speech Therapy Consult: No  Current Support Network: Lives Alone (Daughter is currently residing with patient)  Confirm Follow Up Transport: Family  Discharge Location  Discharge Placement: Home with home health

## 2021-09-03 NOTE — PROGRESS NOTES
TRANSFER - IN REPORT:    Verbal report received from PADILLA Tabor (name) on Nemesio Karla  being received from ED (unit) for routine progression of care      Report consisted of patients Situation, Background, Assessment and   Recommendations(SBAR). Information from the following report(s) SBAR, ED Summary and Recent Results was reviewed with the receiving nurse. Opportunity for questions and clarification was provided. Assessment completed upon patients arrival to unit and care assumed. 0: Notified hospitalist of critical calcium 6.4 and of Hgb 6.3. Orders received, H&H obtained. 7066: Pt Hgb resulted at 6.4 on redraw. Hospitalist notified, will transfuse with dayshift. 0730: Bedside and Verbal shift change report given to Merline Barrios, RN  (oncoming nurse) by Marcial Barron RN (offgoing nurse). Report included the following information SBAR, Kardex, ED Summary, MAR, Recent Results and Cardiac Rhythm VPACED.

## 2021-09-03 NOTE — ED NOTES
TRANSFER - OUT REPORT:    Verbal report given to 4S RN on Jos Valiente  being transferred to 4S(unit) for routine progression of care       Report consisted of patients Situation, Background, Assessment and   Recommendations(SBAR). Information from the following report(s) SBAR, ED Summary, [facility] and Recent Results was reviewed with the receiving nurse. Lines:   Venous Access Device Hemodialysis catheter Upper chest (subclavicular area, right (Active)       Peripheral IV 09/02/21 Right Antecubital (Active)        Opportunity for questions and clarification was provided.       Patient transported with:   Registered Nurse

## 2021-09-03 NOTE — PROGRESS NOTES
[facility] [facility] Adult  Hospitalist Group                                                                                          Hospitalist Progress Note  Jessica Cary MD  Answering service: [phone] from in house phone        Date of Service:  9/3/2021  NAME:  Isidra Carrera  :  1950  MRN:  [id number]      Admission Summary:   Isidra Carrera is a 73 y.o. female who presents acute renal failure referred by heart failure center for fluid overload, and for abnormal kidney function.  Patient does have LVAD Heartmate 2. Her past medical history includes heart failure s/p LVAD, COPD, CKD stage 3, pulmonary HTN, Depression, HTN, and HLD. As per daughter at bedside patient was previously followed by LVAD team and then was in hospice then stopped all the medications did do well she still had a permacath and gradually after doing well came out of hospice. Yesterday home health nurses came drawn the labs and after reviewing the labs they were sent to the ED for acute renal failure. As discussed before patient has CKD as well and had a permacath currently not in use.     Interval history / Subjective:   Seen and examined discussed with daughter at bedside and need 1 unit of blood  Patient has permacath not functional at this time last used in April for 2 weeks for dialysis  Patient also had some run of V. tach calcium being replaced awaiting heart failure team to evaluate LVAD  Patient may require hemodialysis long-term defer to nephrology [facility] will address     Assessment & Plan:     Acute renal failure  Unclear etiology does not look like cardiorenal  Patient was on diuretics will hold  Lfetcher catheter insertion care I's and O's Daily weight  Ordered for ultrasonogram of kidneys--no hydronephrosis or nephrolithiasis  Nephrology consulted may need hemodialysis  Urinalysis showing yeast infection we will treat with Diflucan renally dosed    Chronic blood loss anemia possibly due to Coumadin versus CKD  Give 1 unit of blood hemoglobin 6.4  Check a stool guaiac on Coumadin  Will defer to advanced heart failure team about Coumadin  We will get a GI consult    Chronic systolic congestive heart failure   - NYHA class IV   - s/p LVAD heartMate 2   - Advanced heart failure team following   - Holding Bumex does not look like volume overloaded  - Cardiac diet   -Get echocardiogram     COPD  - PRN nebs      DM   -A1c  - Monitor BG AC/HS      BLE edema with ulcers   - Elevate legs   - Wound care following      JED   - Continue home trilogy     Hx of Sz d/o:  - on keppra     Morbid Obesity      Code status: Full code  DVT prophylaxis: On Coumadin    Care Plan discussed with: Patient/Family and Nurse  Anticipated Disposition: Home w/Family  Anticipated Discharge: 24 hours to 48 hours     Hospital Problems  Date Reviewed: 7/27/2021        Codes Class Noted POA    CHF exacerbation ([facility] [address] ICD-10-CM: I50.9  ICD-9-CM: 428.0  9/2/2021 Unknown        * (Principal) ROCIO (acute kidney injury) ([facility] [address] (Chronic) ICD-10-CM: N17.9  ICD-9-CM: 584.9  11/9/2020 Yes                Review of Systems:   A comprehensive review of systems was negative. Vital Signs:    Last 24hrs VS reviewed since prior progress note. Most recent are:  Visit Vitals  BP (!) 78/0   Pulse 87   Temp 97.7 °F (36.5 °C)   Resp 20   Ht 5' 7\" (1.702 m)   Wt 114.8 kg (253 lb)   SpO2 92%   BMI 39.63 kg/m²         Intake/Output Summary (Last 24 hours) at 9/3/2021 1044  Last data filed at 9/3/2021 7133  Gross per 24 hour   Intake 1000 ml   Output 400 ml   Net 600 ml        Physical Examination:     I had a face to face encounter with this patient and independently examined them on 9/3/2021 as outlined below:          Constitutional:  No acute distress, cooperative, pleasant    ENT:  Oral mucosa moist, oropharynx benign. Resp:  CTA bilaterally. No wheezing/rhonchi/rales.  No accessory muscle use   CV:  Regular rhythm, normal rate, no murmurs, gallops, rubs    GI:  Soft, non distended, non tender. normoactive bowel sounds, no hepatosplenomegaly     Musculoskeletal:  No edema, warm, 2+ pulses throughout    Neurologic:  Moves all extremities. AAOx3, CN II-XII reviewed            Data Review:    I personally reviewed  Image and LANBS      Labs:     Recent Labs     09/03/21  0606 09/03/21  0419 09/02/21  1256 09/02/21  1256   WBC  --  1.9*  --  3.1*   HGB 6.4* 6.3*   < > 8.1*   HCT 20.5* 20.1*   < > 25.0*   PLT  --  109*  --  140*    < > = values in this interval not displayed. Recent Labs     09/03/21  0419 09/02/21  1256    133*   K 4.2 5.0   * 104   CO2 20* 23   BUN 97* 99*   CREA 7.12* 7.38*   GLU 77 91   CA 6.4* 7.3*   MG  --  2.6*     Recent Labs     09/02/21  1256   ALT <6*   AP 83   TBILI 0.6   TP 7.7   ALB 1.9*   GLOB 5.8*     Recent Labs     09/03/21  0419 09/02/21  1530 09/01/21  0000   INR 2.4* 2.4* 3.4   PTP 24.2* 24.0*  --       No results for input(s): FE, TIBC, PSAT, FERR in the last 72 hours. Lab Results   Component Value Date/Time    Folate 10.0 10/28/2020 03:56 AM      No results for input(s): PH, PCO2, PO2 in the last 72 hours. No results for input(s): CPK, CKNDX, TROIQ in the last 72 hours.     No lab exists for component: CPKMB  Lab Results   Component Value Date/Time    Cholesterol, total 129 04/16/2021 10:40 AM    HDL Cholesterol 33 04/16/2021 10:40 AM    LDL, calculated 74 04/16/2021 10:40 AM    Triglyceride 110 04/16/2021 10:40 AM    CHOL/HDL Ratio 3.9 04/16/2021 10:40 AM     Lab Results   Component Value Date/Time    Glucose (POC) 96 05/28/2021 11:57 AM    Glucose (POC) 118 (H) 05/28/2021 08:54 AM    Glucose (POC) 154 (H) 05/27/2021 05:18 PM    Glucose (POC) 99 05/27/2021 09:08 AM    Glucose (POC) 83 05/26/2021 10:12 PM     Lab Results   Component Value Date/Time    Color PINK 09/02/2021 05:00 PM    Appearance CLOUDY (A) 09/02/2021 05:00 PM    Specific gravity 1.013 09/02/2021 05:00 PM    Specific gravity 1.013 11/09/2020 12:05 AM    pH (UA) 5.0 09/02/2021 05:00 PM    Protein 300 (A) 09/02/2021 05:00 PM    Glucose Negative 09/02/2021 05:00 PM    Ketone Negative 09/02/2021 05:00 PM    Bilirubin Negative 09/02/2021 05:00 PM    Urobilinogen 0.2 09/02/2021 05:00 PM    Nitrites Negative 09/02/2021 05:00 PM    Leukocyte Esterase SMALL (A) 09/02/2021 05:00 PM    Epithelial cells FEW 09/02/2021 05:00 PM    Bacteria Negative 09/02/2021 05:00 PM    WBC 0-4 09/02/2021 05:00 PM    RBC 20-50 09/02/2021 05:00 PM         Medications Reviewed:     Current Facility-Administered Medications   Medication Dose Route Frequency    0.9% sodium chloride infusion 250 mL  250 mL IntraVENous PRN    calcium gluconate 2 g/100 mL sodium chloride (ISO-OSM)  2 g IntraVENous ONCE    warfarin (COUMADIN) tablet 3 mg  3 mg Oral ONCE    fluconazole (DIFLUCAN) tablet 100 mg  100 mg Oral DAILY    sodium chloride (NS) flush 5-40 mL  5-40 mL IntraVENous Q8H    sodium chloride (NS) flush 5-40 mL  5-40 mL IntraVENous PRN    acetaminophen (TYLENOL) tablet 650 mg  650 mg Oral Q6H PRN    Or    acetaminophen (TYLENOL) suppository 650 mg  650 mg Rectal Q6H PRN    ondansetron (ZOFRAN ODT) tablet 4 mg  4 mg Oral Q8H PRN    Or    ondansetron (ZOFRAN) injection 4 mg  4 mg IntraVENous Q6H PRN    senna (SENOKOT) tablet 8.6 mg  1 Tablet Oral DAILY PRN    levETIRAcetam (KEPPRA) tablet 250 mg  250 mg Oral BID    PARoxetine (PAXIL) tablet 20 mg  20 mg Oral DAILY    Warfarin - pharamcy to dose   Other Rx Dosing/Monitoring    0.9% sodium chloride infusion  100 mL/hr IntraVENous CONTINUOUS    cephALEXin (KEFLEX) capsule 500 mg  500 mg Oral BID    hydrALAZINE (APRESOLINE) tablet 100 mg  100 mg Oral TID    tafamidis cap 61 mg (Patient Supplied)  61 mg Oral DAILY     ______________________________________________________________________  EXPECTED LENGTH OF STAY: 3d 2h  ACTUAL LENGTH OF STAY:          1                 Lelia William MD

## 2021-09-03 NOTE — ED NOTES
Bedside shift change report given to Bernadette Bradley (oncoming nurse) by Constanza Olson (offgoing nurse). Report included the following information SBAR, Kardex, ED Summary and Recent Results.

## 2021-09-04 NOTE — PROGRESS NOTES
1930: Bedside shift change report given to Nely CAUSEY (oncoming nurse) by Mary Encinas RN (offgoing nurse). Report included the following information SBAR, Kardex, Procedure Summary, Intake/Output, MAR and Recent Results. 0500: Unable to draw labs from patient. Contacted Kevin NP for art stick order. Orders to defer to day shift for labs when central line access can be obtained.

## 2021-09-04 NOTE — PROGRESS NOTES
Venous duplex of the arm was attempted twice today. In the a.m., pt in consultation with MD.  Pt in middle of procedure now. Exam to be deferred late today or tomorrow.     Joaquina Morales RVT  Vascular Lab

## 2021-09-04 NOTE — PROGRESS NOTES
Charting and patient care of Jose Manuel Abrams by Chico Gonzalez RN  from [phone] was supervised and approved by this RN.

## 2021-09-04 NOTE — PROGRESS NOTES
[facility] [facility] Adult  Hospitalist Group                                                                                          Hospitalist Progress Note  Jessica Cary MD  Answering service: [phone] from in house phone        Date of Service:  2021  NAME:  Isidra Carrera  :  1950  MRN:  [id number]      Admission Summary:   Isidra Carrera is a 73 y.o. female who presents acute renal failure referred by heart failure center for fluid overload, and for abnormal kidney function.  Patient does have LVAD Heartmate 2. Her past medical history includes heart failure s/p LVAD, COPD, CKD stage 3, pulmonary HTN, Depression, HTN, and HLD. As per daughter at bedside patient was previously followed by LVAD team and then was in hospice then stopped all the medications did do well she still had a permacath and gradually after doing well came out of hospice. Yesterday home health nurses came drawn the labs and after reviewing the labs they were sent to the ED for acute renal failure. As discussed before patient has CKD as well and had a permacath currently not in use.     Interval history / Subjective:   Seen and examined discussed with daughter at bedside   Patient has poor vascular access discussed with  RN  There is a tentative palliative care meeting today for long-term care discussion with the daughter if decide to proceed with full restorative measures will get central line discussed with nephrology after getting the labs probably will continue order for starting dialysis may radiate Antonio if permacath is unable to use  Patient has permacath not functional at this time last used in April for 2 weeks for dialysis       Assessment & Plan:     Acute renal failure  Unclear etiology does not look like cardiorenal versus ATN  Patient was on diuretics will hold nephrology following  Fletcher catheter insertion care I's and O's Daily weight  Ordered for ultrasonogram of kidneys--no hydronephrosis or nephrolithiasis  Nephrology consulted may need hemodialysis--plan as above may initiate today  Urinalysis showing yeast infection we will treat with Diflucan renally dosed    Chronic blood loss anemia possibly due to Coumadin versus CKD  Give 1 unit of blood hemoglobin 6.4--blood transfusion was not actually given due to unavailability of blood types  Check a stool guaiac on Coumadin--pending stool guaiac  Will defer to advanced heart failure team about Coumadin dosing    Chronic systolic congestive heart failure   - NYHA class IV   - s/p LVAD heartMate 2   - Advanced heart failure team following   - Holding Bumex does not look like volume overloaded  - Cardiac diet   -Get echocardiogram     COPD  - PRN nebs      DM   -A1c  - Monitor BG AC/HS      BLE edema with ulcers   - Elevate legs   - Wound care following      JED   - Continue home trilogy     Hx of Jose d/o:  - on keppra     Morbid Obesity      Code status: Full code palliative care to meet with the patient's family for further goal of care discussion  DVT prophylaxis: On Coumadin    Care Plan discussed with: Patient/Family and Nurse  Anticipated Disposition: Home w/Family  Anticipated Discharge: 24 hours to 48 hours     Hospital Problems  Date Reviewed: 7/27/2021        Codes Class Noted POA    CHF exacerbation ([facility] [address] ICD-10-CM: I50.9  ICD-9-CM: 428.0  9/2/2021 Unknown        * (Principal) ROCIO (acute kidney injury) ([facility] [address] (Chronic) ICD-10-CM: N17.9  ICD-9-CM: 584.9  11/9/2020 Yes                Review of Systems:   A comprehensive review of systems was negative. Vital Signs:    Last 24hrs VS reviewed since prior progress note.  Most recent are:  Visit Vitals  BP (!) 78/0   Pulse (!) 110   Temp 98 °F (36.7 °C)   Resp 25   Ht 5' 7\" (1.702 m)   Wt 114.8 kg (253 lb)   SpO2 93%   BMI 39.63 kg/m²         Intake/Output Summary (Last 24 hours) at 9/4/2021 1128  Last data filed at 9/4/2021 0935  Gross per 24 hour   Intake 600 ml   Output 550 ml Net 50 ml        Physical Examination:     I had a face to face encounter with this patient and independently examined them on 9/4/2021 as outlined below:          Constitutional:  No acute distress,    ENT:  Oral mucosa moist, oropharynx benign. Resp:  CTA bilaterally. CV:  LVAD hum    GI:  Soft, non distended, non tender. bs+    Musculoskeletal:  Extremity swollen edematous lower extremity chronic skin changes    Neurologic:  Moves all extremities. AAOx3, CN II-XII reviewed            Data Review:    I personally reviewed  Image and LANBS    US RETROPERITONEUM COMP    Result Date: 9/2/2021  1. Sonographic evidence of medical renal disease. 2.  No hydronephrosis or nephrolithiasis. XR CHEST PORT    Result Date: 9/2/2021  1. Stable minimal bibasilar atelectasis. 2. Stable cardiomegaly with postoperative changes, lines and tubes as described. .  . Labs:     Recent Labs     09/03/21  0606 09/03/21  0419 09/02/21  1256 09/02/21  1256   WBC  --  1.9*  --  3.1*   HGB 6.4* 6.3*   < > 8.1*   HCT 20.5* 20.1*   < > 25.0*   PLT  --  109*  --  140*    < > = values in this interval not displayed. Recent Labs     09/03/21  0419 09/02/21  1256    133*   K 4.2 5.0   * 104   CO2 20* 23   BUN 97* 99*   CREA 7.12* 7.38*   GLU 77 91   CA 6.4* 7.3*   MG  --  2.6*     Recent Labs     09/02/21  1256   ALT <6*   AP 83   TBILI 0.6   TP 7.7   ALB 1.9*   GLOB 5.8*     Recent Labs     09/03/21  0419 09/02/21  1530   INR 2.4* 2.4*   PTP 24.2* 24.0*      Recent Labs     09/03/21  0419   TIBC 129*   PSAT 40      Lab Results   Component Value Date/Time    Folate 10.0 10/28/2020 03:56 AM      No results for input(s): PH, PCO2, PO2 in the last 72 hours. No results for input(s): CPK, CKNDX, TROIQ in the last 72 hours.     No lab exists for component: CPKMB  Lab Results   Component Value Date/Time    Cholesterol, total 129 04/16/2021 10:40 AM    HDL Cholesterol 33 04/16/2021 10:40 AM    LDL, calculated 74 04/16/2021 10:40 AM    Triglyceride 110 04/16/2021 10:40 AM    CHOL/HDL Ratio 3.9 04/16/2021 10:40 AM     Lab Results   Component Value Date/Time    Glucose (POC) 96 05/28/2021 11:57 AM    Glucose (POC) 118 (H) 05/28/2021 08:54 AM    Glucose (POC) 154 (H) 05/27/2021 05:18 PM    Glucose (POC) 99 05/27/2021 09:08 AM    Glucose (POC) 83 05/26/2021 10:12 PM     Lab Results   Component Value Date/Time    Color PINK 09/02/2021 05:00 PM    Appearance CLOUDY (A) 09/02/2021 05:00 PM    Specific gravity 1.013 09/02/2021 05:00 PM    Specific gravity 1.013 11/09/2020 12:05 AM    pH (UA) 5.0 09/02/2021 05:00 PM    Protein 300 (A) 09/02/2021 05:00 PM    Glucose Negative 09/02/2021 05:00 PM    Ketone Negative 09/02/2021 05:00 PM    Bilirubin Negative 09/02/2021 05:00 PM    Urobilinogen 0.2 09/02/2021 05:00 PM    Nitrites Negative 09/02/2021 05:00 PM    Leukocyte Esterase SMALL (A) 09/02/2021 05:00 PM    Epithelial cells FEW 09/02/2021 05:00 PM    Bacteria Negative 09/02/2021 05:00 PM    WBC 0-4 09/02/2021 05:00 PM    RBC 20-50 09/02/2021 05:00 PM         Medications Reviewed:     Current Facility-Administered Medications   Medication Dose Route Frequency    fluconazole (DIFLUCAN) tablet 100 mg  100 mg Oral DAILY    HYDROcodone-acetaminophen (NORCO) 5-325 mg per tablet 1 Tablet  1 Tablet Oral Q6H PRN    sodium chloride (NS) flush 5-40 mL  5-40 mL IntraVENous Q8H    sodium chloride (NS) flush 5-40 mL  5-40 mL IntraVENous PRN    acetaminophen (TYLENOL) tablet 650 mg  650 mg Oral Q6H PRN    Or    acetaminophen (TYLENOL) suppository 650 mg  650 mg Rectal Q6H PRN    ondansetron (ZOFRAN ODT) tablet 4 mg  4 mg Oral Q8H PRN    Or    ondansetron (ZOFRAN) injection 4 mg  4 mg IntraVENous Q6H PRN    senna (SENOKOT) tablet 8.6 mg  1 Tablet Oral DAILY PRN    levETIRAcetam (KEPPRA) tablet 250 mg  250 mg Oral BID    PARoxetine (PAXIL) tablet 20 mg  20 mg Oral DAILY    Warfarin - pharamcy to dose   Other Rx Dosing/Monitoring    0.9% sodium chloride infusion  50 mL/hr IntraVENous CONTINUOUS    cephALEXin (KEFLEX) capsule 500 mg  500 mg Oral BID    hydrALAZINE (APRESOLINE) tablet 100 mg  100 mg Oral TID    tafamidis cap 61 mg (Patient Supplied)  61 mg Oral DAILY     ______________________________________________________________________  EXPECTED LENGTH OF STAY: 3d 2h  ACTUAL LENGTH OF STAY:          2                 Lelia William MD

## 2021-09-04 NOTE — PROGRESS NOTES
[facility]   [address] [address], [facility] [address], [facility]  Phone: [phone]   [phone]       Nephrology Progress Note  Kwadwo Daley     1950     [id number]  Date of Admission : 9/2/2021 09/04/21    CC: Follow up for ROCIO       Assessment and Plan   ROCIO on CKD:  - appears to have ATN . R/o BSI   - UA : hematuria and proteinuria   - urine lytes pending   - Non oliguric   - labs pending   - Pt and family undecided on dialysis     Right IJ permacath. -This is being unused since July 2021.  -This is unacceptable to leave a permacath in the patient who is no longer on hospice and has LVAD with the potential risk for bloodstream infections. -f/u  peripheral blood cultures.  -if Cx negative, can keep the line for possible dialysis this admission      CKD IV:  - baseline Cr around 2 at baseline     Chronic systolic CHF, stage C NYHA class IV  Combined ischemic and nonischemic cardiomyopathy  S/p HM 2 LVAD implant 4/15/2017 by Dr. 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      Chronic anemia :   ATTR amyloidosis  - check iron studies  -On HEIKE     Hx of Sz d/o:  - on keppra     Chronic A. Fib.     History of endometrial Ca  Obesity  COPD     Interval History:  Seen and examined.  cc/ 24 hrs, urine appears dark and has hematuria. UA with hematuria and proteinuria. Denies any new sx. Persistent Nausea +      Review of Systems: A comprehensive review of systems was negative.     Current Medications:   Current Facility-Administered Medications   Medication Dose Route Frequency    fluconazole (DIFLUCAN) tablet 100 mg  100 mg Oral DAILY    HYDROcodone-acetaminophen (NORCO) 5-325 mg per tablet 1 Tablet  1 Tablet Oral Q6H PRN    sodium chloride (NS) flush 5-40 mL  5-40 mL IntraVENous Q8H    sodium chloride (NS) flush 5-40 mL  5-40 mL IntraVENous PRN    acetaminophen (TYLENOL) tablet 650 mg  650 mg Oral Q6H PRN    Or    acetaminophen (TYLENOL) suppository 650 mg  650 mg Rectal Q6H PRN    ondansetron (ZOFRAN ODT) tablet 4 mg  4 mg Oral Q8H PRN    Or    ondansetron (ZOFRAN) injection 4 mg  4 mg IntraVENous Q6H PRN    senna (SENOKOT) tablet 8.6 mg  1 Tablet Oral DAILY PRN    levETIRAcetam (KEPPRA) tablet 250 mg  250 mg Oral BID    PARoxetine (PAXIL) tablet 20 mg  20 mg Oral DAILY    Warfarin - pharamcy to dose   Other Rx Dosing/Monitoring    0.9% sodium chloride infusion  50 mL/hr IntraVENous CONTINUOUS    cephALEXin (KEFLEX) capsule 500 mg  500 mg Oral BID    hydrALAZINE (APRESOLINE) tablet 100 mg  100 mg Oral TID    tafamidis cap 61 mg (Patient Supplied)  61 mg Oral DAILY      Allergies   Allergen Reactions    Atarax [Hydroxyzine Hcl] Other (comments)     Small doses tolerated. Full dose 10mg causes oversedation.  Benzodiazepines Other (comments)     Respiratory issues       Objective:  Vitals:    Vitals:    09/04/21 0209 09/04/21 0403 09/04/21 0605 09/04/21 0703   BP:       Pulse: 92 98 96 95   Resp:  18  20   Temp:  98.7 °F (37.1 °C)  98 °F (36.7 °C)   SpO2:  99%  93%   Weight:       Height:         Intake and Output:  No intake/output data recorded. 09/02 1901 - 09/04 0700  In: 9767 [P.O.:720; I.V.:1100]  Out: 725 [Urine:725]    Physical Examination:    General: Obese   Neck:  Supple, no mass  Resp:  Lungs CTA B/L, no wheezing , normal respiratory effort  CV:  VAD sounds,  no murmur or rub, no LE edema  GI:  Soft, NT, + Bowel sounds, no hepatosplenomegaly  Neurologic:  Non focal  Psych:             AAO x 3 appropriate affect   Skin:  No Rash  :  Fletcher +    []    High complexity decision making was performed  []    Patient is at high-risk of decompensation with multiple organ involvement    Lab Data Personally Reviewed: I have reviewed all the pertinent labs, microbiology data and radiology studies during assessment.     Recent Labs     09/03/21  0419 09/02/21  1530 09/02/21  1256     -- 133*   K 4.2  --  5.0   *  --  104   CO2 20*  --  23   GLU 77  --  91   BUN 97*  --  99*   CREA 7.12*  --  7.38*   CA 6.4*  --  7.3*   MG  --   --  2.6*   ALB  --   --  1.9*   ALT  --   --  <6*   INR 2.4* 2.4*  --      Recent Labs     09/03/21  0606 09/03/21  0419 09/02/21  1256   WBC  --  1.9* 3.1*   HGB 6.4* 6.3* 8.1*   HCT 20.5* 20.1* 25.0*   PLT  --  109* 140*     No results found for: SDES  Lab Results   Component Value Date/Time    Culture result: CANDIDA ALBICANS (A) 05/13/2021 11:29 AM    Culture result: RARE DIPHTHEROIDS (A) 05/12/2021 04:29 AM    Culture result: LIGHT YEAST, (APPARENT CANDIDA ALBICANS) (A) 05/11/2021 07:09 PM    Culture result: LIGHT NORMAL RESPIRATORY BETO 05/11/2021 07:09 PM    Culture result: NO GROWTH 5 DAYS 05/11/2021 04:30 PM     Recent Results (from the past 24 hour(s))   ECHO ADULT COMPLETE    Collection Time: 09/03/21  9:28 AM   Result Value Ref Range    IVSd 0.79 0.60 - 0.90 cm    LVIDd 6.76 (A) 3.90 - 5.30 cm    LVIDs 5.72 cm    LVOT d 2.28 cm    LVPWd 1.02 (A) 0.60 - 0.90 cm    RVIDd 6.66 cm    Left Atrium Major Axis 4.27 cm    LA Volume 115.00 22.0 - 52.0 mL    LA Area 4C 28.81 cm2    LA Vol 2C 114.55 (A) 22.00 - 52.00 mL    LA Vol 4C 91.65 (A) 22.00 - 52.00 mL    Pulmonic Valve Systolic Peak Instantaneous Gradient 4.71 mmHg    Pulmonic Valve Max Velocity 108.51 cm/s    Tapse 1.91 1.50 - 2.00 cm    Triscuspid Valve Regurgitation Peak Gradient 38.55 mmHg    TR Max Velocity 310.45 cm/s    Ao Root D 2.94 cm    LV Mass .3 67.0 - 162.0 g    LV Mass AL Index 119.8 43.0 - 95.0 g/m2    Left Atrium Minor Axis 1.91 cm    LA Vol Index 51.57 16.00 - 28.00 ml/m2    LA Vol Index 51.37 16.00 - 28.00 ml/m2    LA Vol Index 41.10 16.00 - 28.00 ml/m2   LACTIC ACID    Collection Time: 09/03/21 12:56 PM   Result Value Ref Range    Lactic acid 0.5 0.4 - 2.0 MMOL/L           Total time spent with patient:  xxx   min.                                Care Plan discussed with:  Patient Family      RN      Consulting Physician /Specialist        I have reviewed the flowsheets. Chart and Pertinent Notes have been reviewed. No change in PMH ,family and social history from Consult note.       Kaylen Edwards MD

## 2021-09-04 NOTE — PROGRESS NOTES
Pharmacist Note  Warfarin Dosing  Consult provided for this 73 y. o.female to manage warfarin for LVAD    INR Goal:1.8-2.5 (2/2 GIB)    Home regimen/ tablet size: 3 mg daily    Drugs that may increase INR: None  Drugs that may decrease INR: None  Other current anticoagulants/ drugs that may increase bleeding risk: None  Risk factors: Age > 65, dialysis  Daily INR ordered: YES    Recent Labs     09/04/21  1710 09/03/21  0606 09/03/21  0419 09/02/21  1530 09/02/21  1256   HGB 6.5* 6.4* 6.3*  --    < >   INR 3.3*  --  2.4* 2.4*  --     < > = values in this interval not displayed. Date               INR                  Dose  9/2   2.4     3 mg   9/3                  2.4                   3 mg   9/4                  3.3                    HOLD                                                                               Assessment/ Plan: Will HOLD warfarin for INR = 3.3. Pharmacy will continue to monitor daily and adjust therapy as indicated. Please contact the pharmacist at   for outpatient recommendations if needed.

## 2021-09-04 NOTE — PROGRESS NOTES
0425: Bedside shift change report given to Cuauhtemoc Escobar RN (oncoming nurse) by Hood Dunn RN  (offgoing nurse). Report included the following information SBAR, Kardex, Intake/Output and MAR.      1100: Paged anesthesiology for central line insertion. 1233: Patients MAP via doppler 102. Salina Patel NP notified. PRN 10 mg IV hydralazine q4 for MAP >90 ordered. Per NP retake MAP in one hour. 1330: Patients MAP via doppler 78.      1700: Central line dressing saturated with blood. Using sterile technique, surgicel applied to insertion site and reinforced with gauze and dressing. 1730: Pt had BM. Unable to collect occult stool sample at this time. After incontinence care, central line dressing saturated. Reinforced with 4x4 gauze and Tegaderm. 1945: Bedside shift change report given to [place] (oncoming nurse) by Cuauhtemoc Escobar RN (offgoing nurse). Report included the following information SBAR, Kardex, Intake/Output and MAR.

## 2021-09-04 NOTE — PROCEDURES
Central Line Placement    Start time: 9/4/2021 3:27 PM  Performed by: Wanda Mays MD  Authorized by: Wanda Mays MD     Indications: vascular access, central pressure monitoring and need for vasopressors  Preanesthetic Checklist: patient identified, risks and benefits discussed, anesthesia consent, site marked, patient being monitored and timeout performed    Timeout Time: 15:27 EDT       Pre-procedure: All elements of maximal sterile barrier technique followed? Yes    2% Chlorhexidine for cutaneous antisepsis, Hand hygiene performed prior to catheter insertion and Ultrasound guidance    Sterile Ultrasound Technique followed?: Yes            Procedure:   Prep:  Chlorhexidine  Location: internal jugular  Orientation:  Left  Patient position:  Trendelenburg  Catheter type:  Quad lumen  Catheter size:  8 Fr  Catheter length:  16 cm  Number of attempts:  1  Successful placement: Yes      Assessment:   Post-procedure:  Catheter secured and sterile dressing applied  Assessment:  Blood return through all ports, free fluid flow and guidewire removal verified  Insertion:  Uncomplicated  Patient tolerance:  Patient tolerated the procedure well with no immediate complications  RIJ with existing IJ catheter, L ICD. Could see small LIJ.

## 2021-09-04 NOTE — PROGRESS NOTES
[facility] in [place], [place]  Heart Failure Inpatient Progress Note    Patient name: Dustin Mckenzie  Patient : 1950  Patient MRN: [id number]  Date of service: 21    Primary care physician: Velma Martell NP  Primary general cardiologist:    Primary [facility] cardiologist: Gordo Ellis MD      CHIEF COMPLAINT:    Chief Complaint   Patient presents with    Abnormal Lab Results        PLAN:  · Admitted with ROCIO on CKD, suspect due to prerenal azotemia vs septic ATN   · Recommend Palliative Care consultation to clarify goals and limits of care; patient previously enrolled in Hospice and had voiced wishes to avoid hospitalization or invasive procedures  · Clarification of code status per primary team     Continue LVAD at current speed, TTE negative for vegetations   Continue NS per nephrology   Management of infection per attending; cultures pending   No BB due to RV failure   No ACEi/ARB/ARNI/MRA due to renal failure and ATTR   Continue hydralazine 100 mg PO TID   Cont Tafamidis 61mg daily  HF labs once central line established; unable to draw peripheral labs   US of LUE d/t unilateral edema   Strict I/O, daily weights     Remainder of care per primary.      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 Aliya Preciado)  · C/b delayed extubation due to severe COPD  · C/b critical illness polyneuropathy  · C/b prolonged hospitalization post-LVAD, 55 days  · C/b sternal wound infection s/p debridement (by Dr. Camacho Bradley) s/p wound vac  · C/b sacral ulcer  · Would culture positive for Staph aureus, not MRSA  · C/b LVAD site drainage, improved  S/p CRT-ICD  · ICD fired due to electrolyte imbalance (2011)  H/o breast cancer (1992)   · s/p bilateral mastectomy/chemo and endometrial cancer s/p hysterectomy  · Lymphedema of LLE due to cancer treatment  Severe COPD with FEV1 50%  Depression  Atrial fibrillation  H/o \"two mini strokes\"  S/p fall with hip facture   · Right hip hemmiarthroplasty (5/23/18) by Dr. Emerald Manzanares)  · S/p removed hip hardwarare due to pain (4/15/19)  COPD severe  CKD, stage 4  Pulmonary hypertension  Cardiac risk factors:  · Morbid obesity, Body mass index is 42.29 kg/m². · DM2 insulin dependent  · JED on Trilogy  · HL  Urinary incontinence, severe  · no procedures due to anticoagulation  Endometrial cancer (2002)  HTN  HL  DNR/DNI  Sepsis    CARDIAC IMAGING:  TTE 5/11/21 LVEF < 15%, LVIDd 5.96cm, TAPSE 0.99cm, RVIDd 4.14cm  TTE 4/19 shows improved LVIDd, 6.68 cm with RVIDd 5.14 cm and TAPSE 1.1 cm   TTE 4/28/21 LVIDd 7.37cm, RVIDd 5.27cm, TAPSE 1.44cm   Echo (9/24/19) LVEF 20-25%, AV opens 1:1, no AR  Echo (5/29/18) LVEF 10-%, ramps study done, report in [facility]  Echo (1/9/18) ramp study done, LVEDD 7.1cm  LHC (11/9/18) 2 vessel disease with 90% OM, 80% PDA, DSA to PDA branch  RHC 4/20 showed adequate Sal CI 3.0 but severely elevated RA pressure 24 mmHg  CXR negative 4/27/21    HEMODYNAMICS:  RHC not done  CPEST not done  6MW not done    OTHER IMAGING:  CXR  CT chest not done    HISTORY OF PRESENT ILLNESS:  I had the pleasure of seeing Jos Valiente in [address] [place] [address] [facility] [place]. Briefly, Jos Valiente is a 73 y.o. female with h/o LVAD implant with HM 2 and ESRD requiring HD (now off HD). She had some recent hospitalizations and additional complications and ultimately decided to discharge home with hospice support.   Her UOP increased and repeat labs at home showed improved renal function, and she was discharged from Hospice services for not meeting criteria. She is still at home, living with her daughter, now with home health services in place instead of hospice. Routine lab surveillance revealed ROCIO on CKD, Cr > 7 and Hgb 6. She was asked to present to ER for further assessment as her goals of care were not entirely clear and had been discharged from hospice. REVIEW OF SYSTEMS:  Review of Systems   Constitutional: Positive for malaise/fatigue. Negative for chills, fever and weight loss. Respiratory: Negative for cough. Cardiovascular: Negative for chest pain, palpitations and leg swelling. Gastrointestinal: Negative for constipation, diarrhea, nausea and vomiting. Musculoskeletal:        Arm swelling   Neurological: Negative for dizziness and focal weakness. PHYSICAL EXAM:  Visit Vitals  BP (!) 78/0   Pulse 95   Temp 98 °F (36.7 °C)   Resp 20   Ht 5' 7\" (1.702 m)   Wt 253 lb (114.8 kg)   SpO2 93%   BMI 39.63 kg/m²     Physical Exam  Constitutional:       Appearance: She is obese. HENT:      Head: Normocephalic and atraumatic. Eyes:      Conjunctiva/sclera: Conjunctivae normal.      Pupils: Pupils are equal, round, and reactive to light. Cardiovascular:      Comments: LVAD Humm noted on auscultation  Pulmonary:      Effort: Pulmonary effort is normal. No respiratory distress. Breath sounds: Rales present. Abdominal:      General: Bowel sounds are normal. There is no distension. Palpations: Abdomen is soft. Tenderness: There is no abdominal tenderness. Musculoskeletal:         General: Swelling present. Cervical back: Neck supple. Right lower le+ Pitting Edema present. Left lower le+ Pitting Edema present. Skin:     General: Skin is warm and dry. Capillary Refill: Capillary refill takes less than 2 seconds. Neurological:      General: No focal deficit present.       Mental Status: She is alert and oriented to person, place, and time. PAST MEDICAL HISTORY:  Past Medical History:   Diagnosis Date    Asthma     Cancer ([facility]     breast    [facility]     endometrial    Congestive heart failure, unspecified     CRI (chronic renal insufficiency)     Depression     Diabetes ([facility]     Hypertension     Severe sepsis with acute organ dysfunction ([facility] 5/11/2021       PAST SURGICAL HISTORY:  Past Surgical History:   Procedure Laterality Date    HX HERNIA REPAIR      HX HYSTERECTOMY      HX MASTECTOMY      IR INSERT NON TUNL CVC OVER 5 YRS  4/26/2021    IR INSERT TUNL CVC W/O PORT OVER 5 YR  5/5/2021       FAMILY HISTORY:  No family history on file. SOCIAL HISTORY:  Social History     Socioeconomic History    Marital status:      Spouse name: Not on file    Number of children: Not on file    Years of education: Not on file    Highest education level: Not on file   Tobacco Use    Smoking status: Never Smoker    Smokeless tobacco: Never Used   Substance and Sexual Activity    Alcohol use: Not Currently     Social Determinants of Health     Financial Resource Strain:     Difficulty of Paying Living Expenses:    Food Insecurity:     Worried About Running Out of Food in the Last Year:     [address] in the Last Year:    Transportation Needs:     Lack of Transportation (Medical):  Lack of Transportation (Non-Medical):    Physical Activity:     Days of Exercise per Week:     Minutes of Exercise per Session:    Stress:     Feeling of Stress :    Social Connections:     Frequency of Communication with Friends and Family:     Frequency of Social Gatherings with Friends and Family:     Attends Voodoo Services:     Active Member of Clubs or Organizations:     Attends Club or Organization Meetings:     Marital Status:        LABORATORY RESULTS:  Labs Latest Ref Rng & Units 9/3/2021 9/3/2021 9/2/2021 7/29/2021 7/22/2021   WBC 3.6 - 11.0 K/uL - 1. 9(L) 3. 1(L) 2. 6(L) 1.8(LL)   RBC 3.80 - 5.20 M/uL - 2.11(L) 2.66(L) 3.20(L) 3.19(L)   Hemoglobin 11.5 - 16.0 g/dL 6. 4(L) 6. 3(L) 8. 1(L) 9.1(L) 9.1(L)   Hematocrit 35.0 - 47.0 % 20. 5(L) 20. 1(L) 25. 0(L) 28. 6(L) 28. 3(L)   MCV 80.0 - 99.0 FL - 95.3 94.0 89 89   Platelets 225 - 493 K/uL - 109(L) 140(L) 150 152   Lymphocytes 12 - 49 % - - 11(L) - -   Monocytes 5 - 13 % - - 4(L) 7 -   Eosinophils 0 - 7 % - - 0 0 -   Basophils 0 - 1 % - - 0 1 -   Albumin 3.5 - 5.0 g/dL - - 1. 9(L) 2. 5(L) 2. 7(L)   Calcium 8.5 - 10.1 MG/DL - 6. 4(LL) 7. 3(L) 7. 8(L) 8.2(L)   Glucose 65 - 100 mg/dL - 77 91 164(H) 105(H)   BUN 6 - 20 MG/DL - 97(H) 99(H) 66(H) 69(H)   Creatinine 0.55 - 1.02 MG/DL - 7.12(H) 7.38(H) 1.98(H) 2.18(H)   Sodium 136 - 145 mmol/L - 136 133(L) 135 138   Potassium 3.5 - 5.1 mmol/L - 4.2 5.0 4.0 3.4(L)    - 226 IU/L - - - - 297(H)   Some recent data might be hidden       ALLERGY:  Allergies   Allergen Reactions    Atarax [Hydroxyzine Hcl] Other (comments)     Small doses tolerated. Full dose 10mg causes oversedation.      Benzodiazepines Other (comments)     Respiratory issues        CURRENT MEDICATIONS:    Current Facility-Administered Medications:     fluconazole (DIFLUCAN) tablet 100 mg, 100 mg, Oral, DAILY, Shasha Mccallum MD, 100 mg at 09/03/21 1151    HYDROcodone-acetaminophen (NORCO) 5-325 mg per tablet 1 Tablet, 1 Tablet, Oral, Q6H PRN, Megha Melton NP, 1 Tablet at 09/04/21 0449    sodium chloride (NS) flush 5-40 mL, 5-40 mL, IntraVENous, Q8H, Arti Ramon MD, 10 mL at 09/04/21 0606    sodium chloride (NS) flush 5-40 mL, 5-40 mL, IntraVENous, PRN, Shasha Mccallum MD    acetaminophen (TYLENOL) tablet 650 mg, 650 mg, Oral, Q6H PRN, 650 mg at 09/03/21 1745 **OR** acetaminophen (TYLENOL) suppository 650 mg, 650 mg, Rectal, Q6H PRN, Arti Torres MD    ondansetron (ZOFRAN ODT) tablet 4 mg, 4 mg, Oral, Q8H PRN **OR** ondansetron (ZOFRAN) injection 4 mg, 4 mg, IntraVENous, Q6H PRN, Shasha Mccallum MD, 4 mg at 09/03/21 1710    senna (SENOKOT) tablet 8.6 mg, 1 Tablet, Oral, DAILY PRN, Vivian Trinh MD    levETIRAcetam (KEPPRA) tablet 250 mg, 250 mg, Oral, BID, Vivian Trinh MD, 250 mg at 09/03/21 1745    PARoxetine (PAXIL) tablet 20 mg, 20 mg, Oral, DAILY, Vivian Trinh MD, 20 mg at 09/03/21 1006    Warfarin - pharamcy to dose, , Other, Rx Dosing/Monitoring, Vivian Trinh MD    0.9% sodium chloride infusion, 50 mL/hr, IntraVENous, CONTINUOUS, Recardo Alpha, Jaspreet ABBASI MD, Last Rate: 50 mL/hr at 09/03/21 1213, 50 mL/hr at 09/03/21 1213    cephALEXin (KEFLEX) capsule 500 mg, 500 mg, Oral, BID, Rosanna Jones MD, 500 mg at 09/03/21 2144    hydrALAZINE (APRESOLINE) tablet 100 mg, 100 mg, Oral, TID, Rosanna Jones MD, 100 mg at 09/03/21 2144    tafamidis cap 61 mg (Patient Supplied), 61 mg, Oral, DAILY, Rosanna Jones MD, 61 mg at 09/03/21 [address] [facility], [address], [address]  Phone: [phone]  Fax: [phone]    PATIENT CARE TEAM:  Patient Care Team:  Noman Calvillo NP as PCP - General (Nurse Practitioner)  Jaimie Barr MD (Cardiology)  Sin Delgado MD as Physician (Cardiology)  Parvin Ruano MD (Cardiothoracic Surgery)  Forrest Cisneros MD (Cardiology)

## 2021-09-05 NOTE — PROGRESS NOTES
Called to see patient re: bleeding around left IJ catheter site. Blood oozing around suture sites. I injected 5cc of 1.5% with epi [id number] around suture sites with  Improvement.   RN to further cover with surgicel and foam compression bandage

## 2021-09-05 NOTE — PROCEDURES
[facility]                      [phone]  Vitals Pre Post Assessment Pre Post   BP BP:  (LVAD pt) (09/05/21 1150) MAP 78 84 LOC A&Ox3, not clear on situation Remains A&Ox3   HR Pulse (Heart Rate): 92 (09/05/21 1150) 119 Lungs Diminished in bases, RA Remains on RA   Resp Resp Rate: 23 (09/05/21 1150) 27 Cardiac V-paced, LVAD V-paced   Temp Temp: 98.6 °F (37 °C) (09/05/21 1150) 98.9   Skin Warm, dry, discoloration of PAD No change   Weight Pre-Dialysis Weight: 114.8 kg (253 lb 1.4 oz) (09/05/21 1150)  Edema Generalized, LUE>RUE generalized   Pain Pain Intensity 1: 0 (09/05/21 1121)  Tele Status Bedside cardiac monitor      Orders   Duration: Start: 1150 End: 1450 Total: 3 hrs   Dialyzer: Dialyzer/Set Up Inspection: Revaclear (09/05/21 1150)   K Bath: Dialysate K (mEq/L): 3.5 (09/05/21 1150)   Ca Bath: Dialysate CA (mEq/L): 2.5 (09/05/21 1150)   Na: Dialysate NA (mEq/L): 140 (09/05/21 1150)   Bicarb: 35   Target Fluid Removal: Goal/Amount of Fluid to Remove (mL): 500 mL (09/05/21 1150)     Access   Type & Location: RIJ tunneled CVC- last accessed in July. -aspiration. Cathflo 1.9ml dwell in each lumen x30 mins. +aspiration/flush.  Running well with  as ordered,   Comments:                                        Labs   HBsAg (Antigen) / date: Neg (09/05/21)                                      HBsAb (Antibody) / date: <3/susceptible (04/27/21)   Source: Obtained from [facility]   Obtained/Reviewed  Critical Results Called HGB   Date Value Ref Range Status   09/05/2021 7.4 (L) 11.5 - 16.0 g/dL Final     Potassium   Date Value Ref Range Status   09/05/2021 4.4 3.5 - 5.1 mmol/L Final     Calcium   Date Value Ref Range Status   09/05/2021 6.8 (L) 8.5 - 10.1 MG/DL Final     BUN   Date Value Ref Range Status   09/05/2021 98 (H) 6 - 20 MG/DL Final     Creatinine   Date Value Ref Range Status   09/05/2021 8.00 (H) 0.55 - 1.02 MG/DL Final        Meds Given   Name Dose Route   alteplase 1.9 mg/lumen CVC dwell   heparin 1900 units/lumen CVC dwell          Adequacy / Fluid    Total Liters Process: 68 L   Net Fluid Removed: 500 mL      Comments   Time Out Done: 1145   Admitting Diagnosis: Fluid overload, ROCIO   Consent obtained/signed: Informed Consent Verified: Yes (09/05/21 1150)   Machine / RO # Machine Number: [id number] (09/05/21 1150)   Primary Nurse Rpt Pre: Fer Lemons RN   Primary Nurse Rpt Post: Fer Lemons RN   Pt Education: CVC precautions, procedural edu provided to daughter   Care Plan: HD today,    Pts outpatient clinic: TBD     Tx Summary   Comments:                         1100: CVC assessed, no redness, warmth or drainage noted. Transparent dressing and biopatch CDI. Each catheter limb disinfected for 60 seconds per limb with alcohol swabs. Caps removed, dialysis CVC hub scrubbed with Prevantics for 15 seconds, followed by a 5 second dry time per Hospital P&P. Unable to aspirate, alteplase 1.9mg/lumen instilled. 1145: Safety checks complete, time out performed. 1150: +aspiration/flush. HD initiated. Access visible, lines secure. Medications reviewed. 1450: HD complete. All possible blood rinsed back. Each catheter limb disinfected for 60 seconds per limb with alcohol swabs. Dialysis CVC hubs scrubbed with Prevantics for 15 seconds, followed by a 5 second dry time per Hospital P&P. Saline flushed, locked and capped. SBAR given to primary RN.

## 2021-09-05 NOTE — PROGRESS NOTES
[facility]   [address] [facility], [facility] [address], [facility]  Phone: [phone]   [phone]       Nephrology Progress Note  Carolina Gonzalez     1950     [id number]  Date of Admission : 9/2/2021 09/05/21    CC: Follow up for ROCIO       Assessment and Plan   ROCIO on CKD:  - appears to have ATN . UA : hematuria and proteinuria   -Patient and her daughter wants to proceed with the initiation of dialysis  -Will use Cathflo for unused permacath. If permacath functions plan for HD today. If permacath dysfunctional, will have to ask IR for Antonio catheter placement    CKD IV:  - baseline Cr around 2 at baseline    Hypocalcemia : will be corrected w/ HD     Chronic systolic CHF, stage C NYHA class IV  Combined ischemic and nonischemic cardiomyopathy  S/p HM 2 LVAD implant 4/15/2017 by Dr. Lia Lazcano at [facility]  hx of recurrent VT   chronic RV failure  chronicsternal wound infection with staph aureus and Morganella.      Chronic anemia :   ATTR amyloidosis  - check iron studies  -On HEIKE     Hx of Sz d/o:  - on keppra     Chronic A. Fib.     History of endometrial Ca  Obesity  COPD    Discussed with patient and daughter, RN     Interval History:  Seen and examined. Renal function continues to worsen. Urine output is marginal.  She had a left IJ CVC placed yesterday and has developed bleeding from the site this morning. Her hemoglobin is at 7.4 g.  Creatinine is up to 8 mg/dL      Review of Systems: A comprehensive review of systems was negative.     Current Medications:   Current Facility-Administered Medications   Medication Dose Route Frequency    alteplase (CATHFLO) 2 mg in sterile water (preservative free) 2 mL injection  2 mg InterCATHeter ONCE    alteplase (CATHFLO) 2 mg in sterile water (preservative free) 2 mL injection  2 mg InterCATHeter ONCE    hydrALAZINE (APRESOLINE) 20 mg/mL injection 10 mg  10 mg IntraVENous Q4H PRN    HYDROcodone-acetaminophen (NORCO) 5-325 mg per tablet 1 Tablet  1 Tablet Oral Q6H PRN    fluconazole (DIFLUCAN) tablet 100 mg  100 mg Oral DAILY    sodium chloride (NS) flush 5-40 mL  5-40 mL IntraVENous Q8H    sodium chloride (NS) flush 5-40 mL  5-40 mL IntraVENous PRN    acetaminophen (TYLENOL) tablet 650 mg  650 mg Oral Q6H PRN    Or    acetaminophen (TYLENOL) suppository 650 mg  650 mg Rectal Q6H PRN    ondansetron (ZOFRAN ODT) tablet 4 mg  4 mg Oral Q8H PRN    Or    ondansetron (ZOFRAN) injection 4 mg  4 mg IntraVENous Q6H PRN    senna (SENOKOT) tablet 8.6 mg  1 Tablet Oral DAILY PRN    levETIRAcetam (KEPPRA) tablet 250 mg  250 mg Oral BID    PARoxetine (PAXIL) tablet 20 mg  20 mg Oral DAILY    Warfarin - pharamcy to dose   Other Rx Dosing/Monitoring    cephALEXin (KEFLEX) capsule 500 mg  500 mg Oral BID    hydrALAZINE (APRESOLINE) tablet 100 mg  100 mg Oral TID    tafamidis cap 61 mg (Patient Supplied)  61 mg Oral DAILY      Allergies   Allergen Reactions    Atarax [Hydroxyzine Hcl] Other (comments)     Small doses tolerated. Full dose 10mg causes oversedation.  Benzodiazepines Other (comments)     Respiratory issues       Objective:  Vitals:    Vitals:    09/05/21 0353 09/05/21 0430 09/05/21 0602 09/05/21 0829   BP:       Pulse: 100 (!) 112 98 95   Resp:  16  20   Temp:  98.3 °F (36.8 °C)  97.9 °F (36.6 °C)   SpO2:  91%  93%   Weight:       Height:         Intake and Output:  No intake/output data recorded.   09/03 1901 - 09/05 0700  In: 3281.3 [P.O.:960; I.V.:2015]  Out: 625 [Urine:625]    Physical Examination:    General: Obese   Neck:  Supple, no mass  Resp:  Lungs CTA B/L, no wheezing , normal respiratory effort  CV:  VAD sounds,  no murmur or rub, no LE edema  GI:  Soft, NT, + Bowel sounds, no hepatosplenomegaly  Neurologic:  Non focal  Psych:             AAO x 3 appropriate affect   Skin:  No Rash  :  Fletcher +    []    High complexity decision making was performed  []    Patient is at high-risk of decompensation with multiple organ involvement    Lab Data Personally Reviewed: I have reviewed all the pertinent labs, microbiology data and radiology studies during assessment.     Recent Labs     09/05/21 0541 09/04/21  1710 09/03/21 0419 09/02/21  1530 09/02/21  1256    138 136  --  133*   K 4.4 4.5 4.2  --  5.0    106 109*  --  104   CO2 20* 20* 20*  --  23   GLU 89 86 77  --  91   BUN 98* 103* 97*  --  99*   CREA 8.00* 7.91* 7.12*  --  7.38*   CA 6.8* 6.7* 6.4*  --  7.3*   MG  --   --   --   --  2.6*   ALB 1.6* 1.5*  --   --  1.9*   ALT <6* <6*  --   --  <6*   INR 3.6* 3.3* 2.4* 2.4*  --      Recent Labs     09/05/21 0541 09/04/21 1710 09/03/21  0606 09/03/21  0419 09/02/21  1256   WBC 3.0* 2.7*  --  1.9* 3.1*   HGB 7.4* 6.5* 6.4* 6.3* 8.1*   HCT 23.0* 20.2* 20.5* 20.1* 25.0*   * 117*  --  109* 140*     No results found for: SDES  Lab Results   Component Value Date/Time    Culture result: NO GROWTH 2 DAYS 09/03/2021 07:16 PM    Culture result: CANDIDA ALBICANS (A) 05/13/2021 11:29 AM    Culture result: RARE DIPHTHEROIDS (A) 05/12/2021 04:29 AM    Culture result: LIGHT YEAST, (APPARENT CANDIDA ALBICANS) (A) 05/11/2021 07:09 PM    Culture result: LIGHT NORMAL RESPIRATORY BETO 05/11/2021 07:09 PM     Recent Results (from the past 24 hour(s))   T4, FREE    Collection Time: 09/04/21  2:44 PM   Result Value Ref Range    T4, Free 1.1 0.8 - 1.5 NG/DL   TSH 3RD GENERATION    Collection Time: 09/04/21  2:44 PM   Result Value Ref Range    TSH 5.24 (H) 0.36 - 3.74 uIU/mL   CBC W/O DIFF    Collection Time: 09/04/21  5:10 PM   Result Value Ref Range    WBC 2.7 (L) 3.6 - 11.0 K/uL    RBC 2.13 (L) 3.80 - 5.20 M/uL    HGB 6.5 (L) 11.5 - 16.0 g/dL    HCT 20.2 (L) 35.0 - 47.0 %    MCV 94.8 80.0 - 99.0 FL    MCH 30.5 26.0 - 34.0 PG    MCHC 32.2 30.0 - 36.5 g/dL    RDW 18.1 (H) 11.5 - 14.5 %    PLATELET 219 (L) 170 - 400 K/uL    MPV 9.3 8.9 - 12.9 FL    NRBC 0.0 0  WBC    ABSOLUTE NRBC 0.00 0.00 - 0.01 K/uL   LACTIC ACID    Collection Time: 09/04/21  5:10 PM   Result Value Ref Range    Lactic acid 0.8 0.4 - 2.0 MMOL/L   LD    Collection Time: 09/04/21  5:10 PM   Result Value Ref Range     (H) 81 - 741 U/L   METABOLIC PANEL, COMPREHENSIVE    Collection Time: 09/04/21  5:10 PM   Result Value Ref Range    Sodium 138 136 - 145 mmol/L    Potassium 4.5 3.5 - 5.1 mmol/L    Chloride 106 97 - 108 mmol/L    CO2 20 (L) 21 - 32 mmol/L    Anion gap 12 5 - 15 mmol/L    Glucose 86 65 - 100 mg/dL     (H) 6 - 20 MG/DL    Creatinine 7.91 (H) 0.55 - 1.02 MG/DL    BUN/Creatinine ratio 13 12 - 20      GFR est AA 6 (L) >60 ml/min/1.73m2    GFR est non-AA 5 (L) >60 ml/min/1.73m2    Calcium 6.7 (L) 8.5 - 10.1 MG/DL    Bilirubin, total 0.4 0.2 - 1.0 MG/DL    ALT (SGPT) <6 (L) 12 - 78 U/L    AST (SGOT) 18 15 - 37 U/L    Alk.  phosphatase 66 45 - 117 U/L    Protein, total 6.6 6.4 - 8.2 g/dL    Albumin 1.5 (L) 3.5 - 5.0 g/dL    Globulin 5.1 (H) 2.0 - 4.0 g/dL    A-G Ratio 0.3 (L) 1.1 - 2.2     NT-PRO BNP    Collection Time: 09/04/21  5:10 PM   Result Value Ref Range    NT pro-BNP >35,000 (H) <125 PG/ML   PROCALCITONIN    Collection Time: 09/04/21  5:10 PM   Result Value Ref Range    Procalcitonin 1.00 ng/mL   PROTHROMBIN TIME + INR    Collection Time: 09/04/21  5:10 PM   Result Value Ref Range    INR 3.3 (H) 0.9 - 1.1      Prothrombin time 32.7 (H) 9.0 - 11.1 sec   OCCULT BLOOD, STOOL    Collection Time: 09/05/21  5:41 AM   Result Value Ref Range    Occult blood, stool Negative NEG     LD    Collection Time: 09/05/21  5:41 AM   Result Value Ref Range     (H) 81 - 042 U/L   METABOLIC PANEL, COMPREHENSIVE    Collection Time: 09/05/21  5:41 AM   Result Value Ref Range    Sodium 137 136 - 145 mmol/L    Potassium 4.4 3.5 - 5.1 mmol/L    Chloride 107 97 - 108 mmol/L    CO2 20 (L) 21 - 32 mmol/L    Anion gap 10 5 - 15 mmol/L    Glucose 89 65 - 100 mg/dL    BUN 98 (H) 6 - 20 MG/DL    Creatinine 8.00 (H) 0.55 - 1.02 MG/DL    BUN/Creatinine ratio 12 12 - 20      GFR est AA 6 (L) >60 ml/min/1.73m2    GFR est non-AA 5 (L) >60 ml/min/1.73m2    Calcium 6.8 (L) 8.5 - 10.1 MG/DL    Bilirubin, total 0.4 0.2 - 1.0 MG/DL    ALT (SGPT) <6 (L) 12 - 78 U/L    AST (SGOT) 18 15 - 37 U/L    Alk. phosphatase 68 45 - 117 U/L    Protein, total 6.6 6.4 - 8.2 g/dL    Albumin 1.6 (L) 3.5 - 5.0 g/dL    Globulin 5.0 (H) 2.0 - 4.0 g/dL    A-G Ratio 0.3 (L) 1.1 - 2.2     NT-PRO BNP    Collection Time: 09/05/21  5:41 AM   Result Value Ref Range    NT pro-BNP >35,000 (H) <125 PG/ML   PROTHROMBIN TIME + INR    Collection Time: 09/05/21  5:41 AM   Result Value Ref Range    INR 3.6 (H) 0.9 - 1.1      Prothrombin time 35.4 (H) 9.0 - 11.1 sec   SAMPLES BEING HELD    Collection Time: 09/05/21  5:41 AM   Result Value Ref Range    SAMPLES BEING HELD 1LAV     COMMENT        Add-on orders for these samples will be processed based on acceptable specimen integrity and analyte stability, which may vary by analyte. CBC WITH AUTOMATED DIFF    Collection Time: 09/05/21  5:41 AM   Result Value Ref Range    WBC 3.0 (L) 3.6 - 11.0 K/uL    RBC 2.43 (L) 3.80 - 5.20 M/uL    HGB 7.4 (L) 11.5 - 16.0 g/dL    HCT 23.0 (L) 35.0 - 47.0 %    MCV 94.7 80.0 - 99.0 FL    MCH 30.5 26.0 - 34.0 PG    MCHC 32.2 30.0 - 36.5 g/dL    RDW 18.1 (H) 11.5 - 14.5 %    PLATELET 125 (L) 465 - 400 K/uL    MPV 9.2 8.9 - 12.9 FL    NRBC 0.0 0  WBC    ABSOLUTE NRBC 0.00 0.00 - 0.01 K/uL    NEUTROPHILS 78 (H) 32 - 75 %    LYMPHOCYTES 13 12 - 49 %    MONOCYTES 7 5 - 13 %    EOSINOPHILS 0 0 - 7 %    BASOPHILS 1 0 - 1 %    IMMATURE GRANULOCYTES 1 (H) 0.0 - 0.5 %    ABS. NEUTROPHILS 2.4 1.8 - 8.0 K/UL    ABS. LYMPHOCYTES 0.4 (L) 0.8 - 3.5 K/UL    ABS. MONOCYTES 0.2 0.0 - 1.0 K/UL    ABS. EOSINOPHILS 0.0 0.0 - 0.4 K/UL    ABS. BASOPHILS 0.0 0.0 - 0.1 K/UL    ABS. IMM. GRANS. 0.0 0.00 - 0.04 K/UL    DF SMEAR SCANNED      RBC COMMENTS ANISOCYTOSIS  1+               Total time spent with patient:  xxx   min.                                Care Plan discussed with:  Patient     Family      RN      Consulting Physician [facility],         I have reviewed the flowsheets. Chart and Pertinent Notes have been reviewed. No change in PMH ,family and social history from Consult note.       Tere Zavala MD

## 2021-09-05 NOTE — PROGRESS NOTES
0740: Bedside shift change report given to Pricila Gates RN (oncoming nurse) by PADILLA Colvin (offgoing nurse). Report included the following information SBAR, Intake/Output, MAR and Recent Results. 0830: On AM assessment, pt central line dressing saturated in blood along with gown and sheets. Anaesthesiologist contacted and assessment at bedside. See MD Pooja Abad note. Central line dressing changed with quick clot and surgicel. 1328: Pt had 8 beat run of V tach. Pt asymptomatic and VSS,    1657: Drive line dressing changed per order    1945: Bedside shift change report given to Ropoa Gaston RN (oncoming nurse) by Pricila Gates RN (offgoing nurse). Report included the following information SBAR, Kardex, Intake/Output and MAR.

## 2021-09-05 NOTE — PROGRESS NOTES
Charting and patient care of Triny Berylestephania by Aries Daigle RN  from [phone] was supervised and approved by this RN.

## 2021-09-05 NOTE — PROGRESS NOTES
[address] [facility]  Hospitalist Group                                                                                          Hospitalist Progress Note  Lelia William MD  Answering service: [phone] OR 36 from in house phone        Date of Service:  2021  NAME:  Jean-Paul Rodriguez  :  1950  MRN:  [id number]      Admission Summary:   Jean-Paul Rodriguez is a 73 y.o. female who presents acute renal failure referred by heart failure center for fluid overload, and for abnormal kidney function.  Patient does have LVAD Heartmate 2. Her past medical history includes heart failure s/p LVAD, COPD, CKD stage 3, pulmonary HTN, Depression, HTN, and HLD. As per daughter at bedside patient was previously followed by LVAD team and then was in hospice then stopped all the medications did do well she still had a permacath and gradually after doing well came out of hospice. Yesterday home health nurses came drawn the labs and after reviewing the labs they were sent to the ED for acute renal failure. As discussed before patient has CKD as well and had a permacath currently not in use. Interval history / Subjective:   Discussed with nephrology and patient's daughter   Patient now has a central line preparing for initiating dialysis today  Patient and her daughter wants to proceed with the initiation of dialysis  Will use [facility] for unused permacath.   She might need of Antonio if [facility] does not open the permacath       Assessment & Plan:     Acute renal failure  Unclear etiology does not look like cardiorenal possibly from ATN  He decided to go on with hemodialysis initiating today  Ordered for ultrasonogram of kidneys--no hydronephrosis or nephrolithiasis  Nephrology consulted  Urinalysis showing yeast infection we will treat with Diflucan renally dosed    Chronic blood loss anemia possibly due to Coumadin versus CKD  Give 1 unit of blood hemoglobin 7.4--blood transfusion was not actually given due to unavailability of blood types  Check a stool guaiac --negative test  Will defer to advanced heart failure team about Coumadin dosing    Chronic systolic congestive heart failure   - NYHA class IV   - s/p LVAD heartMate 2   - Advanced heart failure team following   - Holding Bumex does not look like volume overloaded now on hemodialysis  - Cardiac diet   -Go from 9/3 EF of 15%     COPD  - PRN nebs      DM   -A1c  - Monitor BG AC/HS      BLE edema with ulcers   - Elevate legs   - Wound care following      JED   - Continue home trilogy     Hx of Sz d/o:  - on keppra     Morbid Obesity      Code status: Full code   DVT prophylaxis: On Coumadin    Care Plan discussed with: Patient/Family and Nurse  Anticipated Disposition: Home w/Family  Anticipated Discharge: 24 hours to 48 hours     Hospital Problems  Date Reviewed: 7/27/2021        Codes Class Noted POA    CHF exacerbation ([facility] [address] ICD-10-CM: I50.9  ICD-9-CM: 428.0  9/2/2021 Unknown        * (Principal) ROCIO (acute kidney injury) ([facility] [address] (Chronic) ICD-10-CM: N17.9  ICD-9-CM: 584.9  11/9/2020 Yes                Review of Systems:   A comprehensive review of systems was negative. Vital Signs:    Last 24hrs VS reviewed since prior progress note. Most recent are:  Visit Vitals  BP (!) 78/0   Pulse (!) 48   Temp 97.9 °F (36.6 °C)   Resp 16   Ht 5' 7\" (1.702 m)   Wt 114.8 kg (253 lb)   SpO2 93%   BMI 39.63 kg/m²         Intake/Output Summary (Last 24 hours) at 9/5/2021 1134  Last data filed at 9/5/2021 5046  Gross per 24 hour   Intake 3021.3 ml   Output 200 ml   Net 2821.3 ml        Physical Examination:     I had a face to face encounter with this patient and independently examined them on 9/5/2021 as outlined below:          Constitutional:  No acute distress,    ENT:  Oral mucosa moist, oropharynx benign. Resp:  CTA bilaterally. CV:  LVAD hum    GI:  Soft, non distended, non tender.  bs+    Musculoskeletal:  Extremity swollen edematous lower extremity chronic skin changes    Neurologic:  Moves all extremities. AAOx3, CN II-XII reviewed            Data Review:    I personally reviewed  Image and LANBS    US RETROPERITONEUM COMP    Result Date: 9/2/2021  1. Sonographic evidence of medical renal disease. 2.  No hydronephrosis or nephrolithiasis. XR CHEST PORT    Result Date: 9/4/2021  Left IJ central line in appropriate position. No pneumothorax. XR CHEST PORT    Result Date: 9/2/2021  1. Stable minimal bibasilar atelectasis. 2. Stable cardiomegaly with postoperative changes, lines and tubes as described. .  . Labs:     Recent Labs     09/05/21 0541 09/04/21 1710   WBC 3.0* 2.7*   HGB 7.4* 6.5*   HCT 23.0* 20.2*   * 117*     Recent Labs     09/05/21 0541 09/04/21 1710 09/03/21 0419 09/02/21  1256 09/02/21  1256    138 136   < > 133*   K 4.4 4.5 4.2   < > 5.0    106 109*   < > 104   CO2 20* 20* 20*   < > 23   BUN 98* 103* 97*   < > 99*   CREA 8.00* 7.91* 7.12*   < > 7.38*   GLU 89 86 77   < > 91   CA 6.8* 6.7* 6.4*   < > 7.3*   MG  --   --   --   --  2.6*    < > = values in this interval not displayed. Recent Labs     09/05/21 0541 09/04/21 1710 09/02/21  1256   ALT <6* <6* <6*   AP 68 66 83   TBILI 0.4 0.4 0.6   TP 6.6 6.6 7.7   ALB 1.6* 1.5* 1.9*   GLOB 5.0* 5.1* 5.8*     Recent Labs     09/05/21 0541 09/04/21 1710 09/03/21  0419   INR 3.6* 3.3* 2.4*   PTP 35.4* 32.7* 24.2*      Recent Labs     09/03/21  0419   TIBC 129*   PSAT 40      Lab Results   Component Value Date/Time    Folate 10.0 10/28/2020 03:56 AM      No results for input(s): PH, PCO2, PO2 in the last 72 hours. No results for input(s): CPK, CKNDX, TROIQ in the last 72 hours.     No lab exists for component: CPKMB  Lab Results   Component Value Date/Time    Cholesterol, total 129 04/16/2021 10:40 AM    HDL Cholesterol 33 04/16/2021 10:40 AM    LDL, calculated 74 04/16/2021 10:40 AM    Triglyceride 110 04/16/2021 10:40 AM    CHOL/HDL Ratio 3.9 04/16/2021 10:40 AM     Lab Results   Component Value Date/Time    Glucose (POC) 96 05/28/2021 11:57 AM    Glucose (POC) 118 (H) 05/28/2021 08:54 AM    Glucose (POC) 154 (H) 05/27/2021 05:18 PM    Glucose (POC) 99 05/27/2021 09:08 AM    Glucose (POC) 83 05/26/2021 10:12 PM     Lab Results   Component Value Date/Time    Color PINK 09/02/2021 05:00 PM    Appearance CLOUDY (A) 09/02/2021 05:00 PM    Specific gravity 1.013 09/02/2021 05:00 PM    Specific gravity 1.013 11/09/2020 12:05 AM    pH (UA) 5.0 09/02/2021 05:00 PM    Protein 300 (A) 09/02/2021 05:00 PM    Glucose Negative 09/02/2021 05:00 PM    Ketone Negative 09/02/2021 05:00 PM    Bilirubin Negative 09/02/2021 05:00 PM    Urobilinogen 0.2 09/02/2021 05:00 PM    Nitrites Negative 09/02/2021 05:00 PM    Leukocyte Esterase SMALL (A) 09/02/2021 05:00 PM    Epithelial cells FEW 09/02/2021 05:00 PM    Bacteria Negative 09/02/2021 05:00 PM    WBC 0-4 09/02/2021 05:00 PM    RBC 20-50 09/02/2021 05:00 PM         Medications Reviewed:     Current Facility-Administered Medications   Medication Dose Route Frequency    hydrALAZINE (APRESOLINE) 20 mg/mL injection 10 mg  10 mg IntraVENous Q4H PRN    HYDROcodone-acetaminophen (NORCO) 5-325 mg per tablet 1 Tablet  1 Tablet Oral Q6H PRN    fluconazole (DIFLUCAN) tablet 100 mg  100 mg Oral DAILY    sodium chloride (NS) flush 5-40 mL  5-40 mL IntraVENous Q8H    sodium chloride (NS) flush 5-40 mL  5-40 mL IntraVENous PRN    acetaminophen (TYLENOL) tablet 650 mg  650 mg Oral Q6H PRN    Or    acetaminophen (TYLENOL) suppository 650 mg  650 mg Rectal Q6H PRN    ondansetron (ZOFRAN ODT) tablet 4 mg  4 mg Oral Q8H PRN    Or    ondansetron (ZOFRAN) injection 4 mg  4 mg IntraVENous Q6H PRN    senna (SENOKOT) tablet 8.6 mg  1 Tablet Oral DAILY PRN    levETIRAcetam (KEPPRA) tablet 250 mg  250 mg Oral BID    PARoxetine (PAXIL) tablet 20 mg  20 mg Oral DAILY    Warfarin - pharamcy to dose   Other Rx Dosing/Monitoring    cephALEXin (KEFLEX) capsule 500 mg 500 mg Oral BID    hydrALAZINE (APRESOLINE) tablet 100 mg  100 mg Oral TID    tafamidis cap 61 mg (Patient Supplied)  61 mg Oral DAILY     ______________________________________________________________________  EXPECTED LENGTH OF STAY: 3d 2h  ACTUAL LENGTH OF STAY:          3                 Kylee Granados MD

## 2021-09-05 NOTE — PROGRESS NOTES
[facility] in [place], [place]  Heart Failure Inpatient Progress Note    Patient name: Carolina Gonzalez  Patient : 1950  Patient MRN: [id number]  Date of service: 21    Primary care physician: Cody Lacey NP  Primary general cardiologist:    Primary [facility] cardiologist: Daniela Dan MD      CHIEF COMPLAINT:    Chief Complaint   Patient presents with    Abnormal Lab Results        PLAN:  · Admitted with ROCIO on CKD, suspect due to prerenal azotemia vs septic ATN   · Initiated on HD   · Recommend Palliative Care consultation to clarify goals and limits of care; patient previously enrolled in Hospice and had voiced wishes to avoid hospitalization or invasive procedures  · Clarification of code status per primary team     Continue LVAD at current speed, TTE negative for vegetations   HD per nephrology   Management of infection per attending; cultures pending   No BB due to RV failure    No ACEi/ARB/ARNI/MRA due to renal failure and ATTR   Continue hydralazine 100 mg PO TID   Cont Tafamidis 61mg daily  US of LUE d/t unilateral edema   Strict I/O, daily weights     Remainder of care per primary.      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 Niels Shone)  · C/b delayed extubation due to severe COPD  · C/b critical illness polyneuropathy  · C/b prolonged hospitalization post-LVAD, 55 days  · C/b sternal wound infection s/p debridement (by Dr. Lia Lazcano) s/p wound vac  · C/b sacral ulcer  · Would culture positive for Staph aureus, not MRSA  · C/b LVAD site drainage, improved  S/p CRT-ICD  · ICD fired due to electrolyte imbalance (2011)  H/o breast cancer (1992)   · s/p bilateral mastectomy/chemo and endometrial cancer s/p hysterectomy  · Lymphedema of LLE due to cancer treatment  Severe COPD with FEV1 50%  Depression  Atrial fibrillation  H/o \"two mini strokes\"  S/p fall with hip facture   · Right hip hemmiarthroplasty (5/23/18) by Dr. Remington Jackson)  · S/p removed hip hardwarare due to pain (4/15/19)  COPD severe  CKD, stage 4  Pulmonary hypertension  Cardiac risk factors:  · Morbid obesity, Body mass index is 42.29 kg/m². · DM2 insulin dependent  · JED on Trilogy  · HL  Urinary incontinence, severe  · no procedures due to anticoagulation  Endometrial cancer (2002)  HTN  HL  DNR/DNI  Sepsis    CARDIAC IMAGING:  TTE 5/11/21 LVEF < 15%, LVIDd 5.96cm, TAPSE 0.99cm, RVIDd 4.14cm  TTE 4/19 shows improved LVIDd, 6.68 cm with RVIDd 5.14 cm and TAPSE 1.1 cm   TTE 4/28/21 LVIDd 7.37cm, RVIDd 5.27cm, TAPSE 1.44cm   Echo (9/24/19) LVEF 20-25%, AV opens 1:1, no AR  Echo (5/29/18) LVEF 10-%, ramps study done, report in [facility]  Echo (1/9/18) ramp study done, LVEDD 7.1cm  LHC (11/9/18) 2 vessel disease with 90% OM, 80% PDA, DSA to PDA branch  RHC 4/20 showed adequate Sal CI 3.0 but severely elevated RA pressure 24 mmHg  CXR negative 4/27/21    INTERVAL EVENTS:   Central line placed  Cr worse, started on HD   Hgb 7.4 from 6.5     HEMODYNAMICS:  RHC not done  CPEST not done  6MW not done    OTHER IMAGING:  CXR  CT chest not done    HISTORY OF PRESENT ILLNESS:  I had the pleasure of seeing Carolina Gonzalez in [facility] at [address] [place] [address] in [place]. Briefly, Carolina Gonzalez is a 73 y.o. female with h/o LVAD implant with HM 2 and ESRD requiring HD (now off HD). She had some recent hospitalizations and additional complications and ultimately decided to discharge home with hospice support.   Her UOP increased and repeat labs at home showed improved renal function, and she was discharged from Hospice services for not meeting criteria. She is still at home, living with her daughter, now with home health services in place instead of hospice. Routine lab surveillance revealed ROCIO on CKD, Cr > 7 and Hgb 6. She was asked to present to ER for further assessment as her goals of care were not entirely clear and had been discharged from hospice. REVIEW OF SYSTEMS:  Review of Systems   Constitutional: Positive for malaise/fatigue. Negative for chills, fever and weight loss. Respiratory: Negative for cough. Cardiovascular: Negative for chest pain, palpitations and leg swelling. Gastrointestinal: Negative for constipation, diarrhea, nausea and vomiting. Musculoskeletal:        Arm swelling   Neurological: Negative for dizziness and focal weakness. PHYSICAL EXAM:  Visit Vitals  BP (!) 78/0   Pulse 100   Temp 98.6 °F (37 °C) (Oral)   Resp 26   Ht 5' 7\" (1.702 m)   Wt 253 lb (114.8 kg)   SpO2 93%   BMI 39.63 kg/m²     Physical Exam  Constitutional:       Appearance: She is obese. HENT:      Head: Normocephalic and atraumatic. Eyes:      Conjunctiva/sclera: Conjunctivae normal.      Pupils: Pupils are equal, round, and reactive to light. Cardiovascular:      Comments: LVAD Humm noted on auscultation  Pulmonary:      Effort: Pulmonary effort is normal. No respiratory distress. Breath sounds: Rales present. Abdominal:      General: Bowel sounds are normal. There is no distension. Palpations: Abdomen is soft. Tenderness: There is no abdominal tenderness. Musculoskeletal:         General: Swelling present. Cervical back: Neck supple. Right lower le+ Pitting Edema present. Left lower le+ Pitting Edema present. Skin:     General: Skin is warm and dry. Capillary Refill: Capillary refill takes less than 2 seconds. Neurological:      General: No focal deficit present. Mental Status: She is alert and oriented to person, place, and time. PAST MEDICAL HISTORY:  Past Medical History:   Diagnosis Date    Asthma     Cancer ([facility]     breast    Cancer ([facility]     endometrial    Congestive heart failure, unspecified     CRI (chronic renal insufficiency)     Depression     Diabetes ([facility]     Hypertension     Severe sepsis with acute organ dysfunction ([facility] 5/11/2021       PAST SURGICAL HISTORY:  Past Surgical History:   Procedure Laterality Date    HX HERNIA REPAIR      HX HYSTERECTOMY      HX MASTECTOMY      IR INSERT NON TUNL CVC OVER 5 YRS  4/26/2021    IR INSERT TUNL CVC W/O PORT OVER 5 YR  5/5/2021       FAMILY HISTORY:  No family history on file. SOCIAL HISTORY:  Social History     Socioeconomic History    Marital status:      Spouse name: Not on file    Number of children: Not on file    Years of education: Not on file    Highest education level: Not on file   Tobacco Use    Smoking status: Never Smoker    Smokeless tobacco: Never Used   Substance and Sexual Activity    Alcohol use: Not Currently     Social Determinants of Health     Financial Resource Strain:     Difficulty of Paying Living Expenses:    Food Insecurity:     Worried About Running Out of Food in the Last Year:     [address] in the Last Year:    Transportation Needs:     Lack of Transportation (Medical):      Lack of Transportation (Non-Medical):    Physical Activity:     Days of Exercise per Week:     Minutes of Exercise per Session:    Stress:     Feeling of Stress :    Social Connections:     Frequency of Communication with Friends and Family:     Frequency of Social Gatherings with Friends and Family:     Attends Jewish Services:     Active Member of Clubs or Organizations:     Attends Club or Organization Meetings:     Marital Status:        LABORATORY RESULTS:  Labs Latest Ref Rng & Units 9/5/2021 9/4/2021 9/3/2021 9/3/2021 9/2/2021 7/29/2021 7/22/2021 WBC 3.6 - 11.0 K/uL 3. 0(L) 2. 7(L) - 1. 9(L) 3. 1(L) 2. 6(L) 1.8(LL)   RBC 3.80 - 5.20 M/uL 2.43(L) 2.13(L) - 2.11(L) 2.66(L) 3.20(L) 3.19(L)   Hemoglobin 11.5 - 16.0 g/dL 7. 4(L) 6. 5(L) 6. 4(L) 6. 3(L) 8. 1(L) 9.1(L) 9.1(L)   Hematocrit 35.0 - 47.0 % 23. 0(L) 20. 2(L) 20. 5(L) 20. 1(L) 25. 0(L) 28. 6(L) 28. 3(L)   MCV 80.0 - 99.0 FL 94.7 94.8 - 95.3 94.0 89 89   Platelets 398 - 211 K/uL 123(L) 117(L) - 109(L) 140(L) 150 152   Lymphocytes 12 - 49 % 13 - - - 11(L) - -   Monocytes 5 - 13 % 7 - - - 4(L) 7 -   Eosinophils 0 - 7 % 0 - - - 0 0 -   Basophils 0 - 1 % 1 - - - 0 1 -   Albumin 3.5 - 5.0 g/dL 1.6(L) 1.5(L) - - 1. 9(L) 2. 5(L) 2. 7(L)   Calcium 8.5 - 10.1 MG/DL 6.8(L) 6. 7(L) - 6. 4(LL) 7. 3(L) 7. 8(L) 8.2(L)   Glucose 65 - 100 mg/dL 89 86 - 77 91 164(H) 105(H)   BUN 6 - 20 MG/DL 98(H) 103(H) - 97(H) 99(H) 66(H) 69(H)   Creatinine 0.55 - 1.02 MG/DL 8.00(H) 7.91(H) - 7.12(H) 7.38(H) 1.98(H) 2.18(H)   Sodium 136 - 145 mmol/L 137 138 - 136 133(L) 135 138   Potassium 3.5 - 5.1 mmol/L 4.4 4.5 - 4.2 5.0 4.0 3.4(L)   TSH 0.36 - 3.74 uIU/mL - 5.24(H) - - - - -   LDH 81 - 246 U/L 319(H) 279(H) - - - - 297(H)   Some recent data might be hidden       ALLERGY:  Allergies   Allergen Reactions    Atarax [Hydroxyzine Hcl] Other (comments)     Small doses tolerated. Full dose 10mg causes oversedation.      Benzodiazepines Other (comments)     Respiratory issues        CURRENT MEDICATIONS:    Current Facility-Administered Medications:     heparin (porcine) 1,000 unit/mL injection 1,900 Units, 1,900 Units, InterCATHeter, DIALYSIS PRN **AND** heparin (porcine) 1,000 unit/mL injection 1,900 Units, 1,900 Units, InterCATHeter, DIALYSIS PRN, Jaspreet Mendoza MD    hydrALAZINE (APRESOLINE) 20 mg/mL injection 10 mg, 10 mg, IntraVENous, Q4H PRN, Suni Walter NP    HYDROcodone-acetaminophen (NORCO) 5-325 mg per tablet 1 Tablet, 1 Tablet, Oral, Q6H PRN, Kely Lomeli MD, 1 Tablet at 09/05/21 0840    fluconazole (DIFLUCAN) tablet 100 mg, 100 mg, Oral, DAILY, Myranda Koehler MD, 100 mg at 09/05/21 0841    sodium chloride (NS) flush 5-40 mL, 5-40 mL, IntraVENous, Q8H, Myranda Koheler MD, 10 mL at 09/05/21 0844    sodium chloride (NS) flush 5-40 mL, 5-40 mL, IntraVENous, PRN, Myranda Koehler MD    acetaminophen (TYLENOL) tablet 650 mg, 650 mg, Oral, Q6H PRN, 650 mg at 09/05/21 1116 **OR** acetaminophen (TYLENOL) suppository 650 mg, 650 mg, Rectal, Q6H PRN, Myranda Koehler MD    ondansetron (ZOFRAN ODT) tablet 4 mg, 4 mg, Oral, Q8H PRN **OR** ondansetron (ZOFRAN) injection 4 mg, 4 mg, IntraVENous, Q6H PRN, Myranda Koehler MD, 4 mg at 09/03/21 1710    senna (SENOKOT) tablet 8.6 mg, 1 Tablet, Oral, DAILY PRN, Myranda Koehler MD    levETIRAcetam (KEPPRA) tablet 250 mg, 250 mg, Oral, BID, Myranda Koehler MD, 250 mg at 09/05/21 0841    PARoxetine (PAXIL) tablet 20 mg, 20 mg, Oral, DAILY, Myranda Koehler MD, 20 mg at 09/05/21 0840    Warfarin - pharamcy to dose, , Other, Rx Dosing/Monitoring, Myranda Koehler MD    cephALEXin (KEFLEX) capsule 500 mg, 500 mg, Oral, BID, Rosanna Jones MD, 500 mg at 09/04/21 2155    hydrALAZINE (APRESOLINE) tablet 100 mg, 100 mg, Oral, TID, Rosanna Jones MD, 100 mg at 09/05/21 0840    tafamidis cap 61 mg (Patient Supplied), 61 mg, Oral, DAILY, Rosanna Jones MD, 61 mg at 09/04/21 [address], NP  [address] [place] [address], [address]  Phone: [phone]  Fax: [phone]    PATIENT CARE TEAM:  Patient Care Team:  Deja Jones NP as PCP - General (Nurse Practitioner)  Italo Coreas MD (Cardiology)  Kaisa Vaca MD as Physician (Cardiology)  Deb Wang MD (Cardiothoracic Surgery)  Karuna Jennings MD (Cardiology)

## 2021-09-05 NOTE — PROGRESS NOTES
Pharmacist Note  Warfarin Dosing  Consult provided for this 73 y. o.female to manage warfarin for LVAD    INR Goal:1.8-2.5 (2/2 GIB)    Home regimen/ tablet size: 2mg qod alternating with 3 mg every other day daily    Drugs that may increase INR: None  Drugs that may decrease INR: None  Other current anticoagulants/ drugs that may increase bleeding risk: None  Risk factors: Age > 65, dialysis  Daily INR ordered: YES    Recent Labs     09/05/21  0541 09/04/21  1710 09/03/21  0606 09/03/21  0419 09/03/21 0419   HGB 7.4* 6.5* 6.4*   < > 6.3*   INR 3.6* 3.3*  --   --  2.4*    < > = values in this interval not displayed. Date               INR                  Dose  9/2   2.4     3 mg   9/3                  2.4                   3 mg   9/4                  3.3                    HOLD  9/5                  3.6                    HOLD                                                                               Assessment/ Plan: Will HOLD warfarin for INR = 3.6  Pharmacy will continue to monitor daily and adjust therapy as indicated.

## 2021-09-06 NOTE — PROGRESS NOTES
1930Soto Georges RN  0430: IJ Dressing Change  0554: Hgb 6.8 -hx of IJ bleeding today & dressing changes q 8 hours using quickclot - usually saturated. Kevin NP aware. 0600: No orders at this time via perfectserve, due to patient being hemodynamically stable. 0730: Bedside shift change report given to Crissy Sanon RN (oncoming nurse) by Gera Paredes RN (offgoing nurse). Report included the following information SBAR, Kardex, Intake/Output, MAR, Recent Results, Med Rec Status and Cardiac Rhythm  .

## 2021-09-06 NOTE — PROGRESS NOTES
[facility]  Hospitalist Group                                                                                          Hospitalist Progress Note  Angelica Lucas MD  Answering service: [phone] from in house phone        Date of Service:  2021  NAME:  Lisa Alberto  :  1950  MRN:  [id number]      Admission Summary:   Lisa Alberto is a 73 y.o. female who presents acute renal failure referred by heart failure center for fluid overload, and for abnormal kidney function.  Patient does have LVAD Heartmate 2. Her past medical history includes heart failure s/p LVAD, COPD, CKD stage 3, pulmonary HTN, Depression, HTN, and HLD. As per daughter at bedside patient was previously followed by LVAD team and then was in hospice then stopped all the medications did do well she still had a permacath and gradually after doing well came out of hospice. Yesterday home health nurses came drawn the labs and after reviewing the labs they were sent to the ED for acute renal failure. As discussed before patient has CKD as well and had a permacath currently not in use.     Interval history / Subjective:   Discussed with nephrology and patient's daughter patient is now on dialysis  Hemoglobin 6.8 difficult match for getting blood we will try to get 1 today  Patient followed by advanced heart failure team  Need a palliative care evaluation tomorrow     Assessment & Plan:     Acute renal failure  Unclear etiology does not look like cardiorenal possibly from ATN  He decided to go on with hemodialysis --management per nephrology patient has permacath  Ordered for ultrasonogram of kidneys--no hydronephrosis or nephrolithiasis  Nephrology consulted  Urinalysis showing yeast infection we will treat with Diflucan renally dosed    Chronic blood loss anemia possibly due to Coumadin versus CKD  Give 1 unit of blood hemoglobin 6.8--blood transfusion was not actually given due to unavailability of blood types  Check a stool guaiac --negative test  Will defer to advanced heart failure team about Coumadin dosing    Chronic systolic congestive heart failure   - NYHA class IV   - s/p LVAD heartMate 2   - Advanced heart failure team following   -Not on beta-blocker due to right ventricular failure not on ACE inhibitor due to CKD  - Holding Bumex does not look like volume overloaded now on hemodialysis  - Cardiac diet   -Go from 9/3 EF of 15%     COPD  - PRN nebs      DM   - Monitor BG AC/HS      BLE edema with ulcers   - Elevate legs   - Wound care following      JED   - Continue home trilogy     Hx of Sz d/o:  - on keppra     Morbid Obesity     Wound care - for groin wound and back wound POA  - pain better with norco     Code status: Full code   DVT prophylaxis: On Coumadin    Care Plan discussed with: Patient/Family and Nurse  Anticipated Disposition: Home w/Family  Anticipated Discharge: 24 hours to 48 hours     Hospital Problems  Date Reviewed: 7/27/2021        Codes Class Noted POA    CHF exacerbation ([facility] ICD-10-CM: I50.9  ICD-9-CM: 428.0  9/2/2021 Unknown        * (Principal) ROCIO (acute kidney injury) ([facility] (Chronic) ICD-10-CM: N17.9  ICD-9-CM: 584.9  11/9/2020 Yes                Review of Systems:   A comprehensive review of systems was negative. Vital Signs:    Last 24hrs VS reviewed since prior progress note. Most recent are:  Visit Vitals  BP (!) 78/0   Pulse (!) 121   Temp 99.6 °F (37.6 °C)   Resp 23   Ht 5' 7\" (1.702 m)   Wt 114.8 kg (253 lb)   SpO2 94%   BMI 39.63 kg/m²         Intake/Output Summary (Last 24 hours) at 9/6/2021 7922  Last data filed at 9/6/2021 0442  Gross per 24 hour   Intake 660 ml   Output 675 ml   Net -15 ml        Physical Examination:     I had a face to face encounter with this patient and independently examined them on 9/6/2021 as outlined below:          Constitutional:  No acute distress,    ENT:  Oral mucosa moist, oropharynx benign. Resp:  CTA bilaterally.     CV:  LVAD hum    GI:  Soft, non distended, non tender. bs+    Musculoskeletal:  Extremity swollen edematous lower extremity chronic skin changes    Neurologic:  Moves all extremities. AAOx3, CN II-XII reviewed            Data Review:    I personally reviewed  Image and LANBS    US RETROPERITONEUM COMP    Result Date: 9/2/2021  1. Sonographic evidence of medical renal disease. 2.  No hydronephrosis or nephrolithiasis. XR CHEST PORT    Result Date: 9/4/2021  Left IJ central line in appropriate position. No pneumothorax. XR CHEST PORT    Result Date: 9/2/2021  1. Stable minimal bibasilar atelectasis. 2. Stable cardiomegaly with postoperative changes, lines and tubes as described. .  . Labs:     Recent Labs     09/06/21 0457 09/05/21 0541   WBC 5.4 3.0*   HGB 6.8* 7.4*   HCT 21.4* 23.0*   * 123*     Recent Labs     09/06/21 0457 09/05/21 0541 09/04/21  1710   * 137 138   K 4.1 4.4 4.5    107 106   CO2 23 20* 20*   BUN 52* 98* 103*   CREA 4.98* 8.00* 7.91*   GLU 73 89 86   CA 7.2* 6.8* 6.7*     Recent Labs     09/06/21 0457 09/05/21  0541 09/04/21  1710   ALT 7* <6* <6*   AP 71 68 66   TBILI 0.6 0.4 0.4   TP 6.6 6.6 6.6   ALB 1.5* 1.6* 1.5*   GLOB 5.1* 5.0* 5.1*     Recent Labs     09/06/21 0457 09/05/21  0541 09/04/21  1710   INR 3.8* 3.6* 3.3*   PTP 37.6* 35.4* 32.7*      No results for input(s): FE, TIBC, PSAT, FERR in the last 72 hours. Lab Results   Component Value Date/Time    Folate 10.0 10/28/2020 03:56 AM      No results for input(s): PH, PCO2, PO2 in the last 72 hours. No results for input(s): CPK, CKNDX, TROIQ in the last 72 hours.     No lab exists for component: CPKMB  Lab Results   Component Value Date/Time    Cholesterol, total 129 04/16/2021 10:40 AM    HDL Cholesterol 33 04/16/2021 10:40 AM    LDL, calculated 74 04/16/2021 10:40 AM    Triglyceride 110 04/16/2021 10:40 AM    CHOL/HDL Ratio 3.9 04/16/2021 10:40 AM     Lab Results   Component Value Date/Time    Glucose (POC) 96 05/28/2021 11:57 AM    Glucose (POC) 118 (H) 05/28/2021 08:54 AM    Glucose (POC) 154 (H) 05/27/2021 05:18 PM    Glucose (POC) 99 05/27/2021 09:08 AM    Glucose (POC) 83 05/26/2021 10:12 PM     Lab Results   Component Value Date/Time    Color PINK 09/02/2021 05:00 PM    Appearance CLOUDY (A) 09/02/2021 05:00 PM    Specific gravity 1.013 09/02/2021 05:00 PM    Specific gravity 1.013 11/09/2020 12:05 AM    pH (UA) 5.0 09/02/2021 05:00 PM    Protein 300 (A) 09/02/2021 05:00 PM    Glucose Negative 09/02/2021 05:00 PM    Ketone Negative 09/02/2021 05:00 PM    Bilirubin Negative 09/02/2021 05:00 PM    Urobilinogen 0.2 09/02/2021 05:00 PM    Nitrites Negative 09/02/2021 05:00 PM    Leukocyte Esterase SMALL (A) 09/02/2021 05:00 PM    Epithelial cells FEW 09/02/2021 05:00 PM    Bacteria Negative 09/02/2021 05:00 PM    WBC 0-4 09/02/2021 05:00 PM    RBC 20-50 09/02/2021 05:00 PM         Medications Reviewed:     Current Facility-Administered Medications   Medication Dose Route Frequency    heparin (porcine) 1,000 unit/mL injection 1,900 Units  1,900 Units InterCATHeter DIALYSIS PRN    And    heparin (porcine) 1,000 unit/mL injection 1,900 Units  1,900 Units InterCATHeter DIALYSIS PRN    hydrALAZINE (APRESOLINE) 20 mg/mL injection 10 mg  10 mg IntraVENous Q4H PRN    HYDROcodone-acetaminophen (NORCO) 5-325 mg per tablet 1 Tablet  1 Tablet Oral Q6H PRN    fluconazole (DIFLUCAN) tablet 100 mg  100 mg Oral DAILY    sodium chloride (NS) flush 5-40 mL  5-40 mL IntraVENous Q8H    sodium chloride (NS) flush 5-40 mL  5-40 mL IntraVENous PRN    acetaminophen (TYLENOL) tablet 650 mg  650 mg Oral Q6H PRN    Or    acetaminophen (TYLENOL) suppository 650 mg  650 mg Rectal Q6H PRN    ondansetron (ZOFRAN ODT) tablet 4 mg  4 mg Oral Q8H PRN    Or    ondansetron (ZOFRAN) injection 4 mg  4 mg IntraVENous Q6H PRN    senna (SENOKOT) tablet 8.6 mg  1 Tablet Oral DAILY PRN    levETIRAcetam (KEPPRA) tablet 250 mg  250 mg Oral BID    PARoxetine (PAXIL) tablet 20 mg  20 mg Oral DAILY    Warfarin - pharamcy to dose   Other Rx Dosing/Monitoring    cephALEXin (KEFLEX) capsule 500 mg  500 mg Oral BID    hydrALAZINE (APRESOLINE) tablet 100 mg  100 mg Oral TID    tafamidis cap 61 mg (Patient Supplied)  61 mg Oral DAILY     ______________________________________________________________________  EXPECTED LENGTH OF STAY: 3d 2h  ACTUAL LENGTH OF STAY:          4                 Shruti Monteiro MD

## 2021-09-06 NOTE — PROGRESS NOTES
[facility] in [facility], [facility]  Heart Failure Inpatient Progress Note    Patient name: Claudette Abt  Patient : 1950  Patient MRN: [id number]  Date of service: 21    Primary care physician: Gayle Scanlon NP  Primary general cardiologist:    Primary [facility] cardiologist: Carol Banuelos MD      CHIEF COMPLAINT:    Chief Complaint   Patient presents with    Abnormal Lab Results        PLAN:  · Admitted with ROCIO on CKD, initiated on HD    · Recommend Palliative Care consultation to clarify goals and limits of care; patient previously enrolled in Hospice and had voiced wishes to avoid hospitalization or invasive procedures  · Clarification of code status per primary team     Continue LVAD at current speed, TTE negative for vegetations   HD per nephrology   Transfusion 1 unit PRBCs today   Management of infection per attending; BC NGTD  No BB due to RV failure    No ACEi/ARB/ARNI/MRA due to renal failure and ATTR   Continue hydralazine 100 mg PO TID   Cont Tafamidis 61mg daily  Hold warfarin d/t supratherapeutic INR   US of LUE d/t unilateral edema   Strict I/O, daily weights     Remainder of care per primary.      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 Ravin Moeller)  · C/b delayed extubation due to severe COPD  · C/b critical illness polyneuropathy  · C/b prolonged hospitalization post-LVAD, 55 days  · C/b sternal wound infection s/p debridement (by Dr. Jerad Barnes) s/p wound vac  · C/b sacral ulcer  · Would culture positive for Staph aureus, not MRSA  · C/b LVAD site drainage, improved  S/p CRT-ICD  · ICD fired due to electrolyte imbalance (2011)  H/o breast cancer (1992)   · s/p bilateral mastectomy/chemo and endometrial cancer s/p hysterectomy  · Lymphedema of LLE due to cancer treatment  Severe COPD with FEV1 50%  Depression  Atrial fibrillation  H/o \"two mini strokes\"  S/p fall with hip facture   · Right hip hemmiarthroplasty (5/23/18) by Dr. Jean-Paul Bermeo)  · S/p removed hip hardwarare due to pain (4/15/19)  COPD severe  CKD, stage 4, on HD  Pulmonary hypertension  Cardiac risk factors:  · Morbid obesity, Body mass index is 42.29 kg/m². · DM2 insulin dependent  · JED on Trilogy  · HL  Urinary incontinence, severe  · no procedures due to anticoagulation  Endometrial cancer (2002)  HTN  HL  DNR/DNI  Sepsis    CARDIAC IMAGING:  TTE 5/11/21 LVEF < 15%, LVIDd 5.96cm, TAPSE 0.99cm, RVIDd 4.14cm  TTE 4/19 shows improved LVIDd, 6.68 cm with RVIDd 5.14 cm and TAPSE 1.1 cm   TTE 4/28/21 LVIDd 7.37cm, RVIDd 5.27cm, TAPSE 1.44cm   Echo (9/24/19) LVEF 20-25%, AV opens 1:1, no AR  Echo (5/29/18) LVEF 10-%, ramps study done, report in [facility]  Echo (1/9/18) ramp study done, LVEDD 7.1cm  LHC (11/9/18) 2 vessel disease with 90% OM, 80% PDA, DSA to PDA branch  RHC 4/20 showed adequate Sal CI 3.0 but severely elevated RA pressure 24 mmHg  CXR negative 4/27/21    INTERVAL EVENTS:   Slept poorly  Sig bleeding from central line   Cr improved from 8 to 4 s/p HD     HEMODYNAMICS:  RHC not done  CPEST not done  6MW not done    OTHER IMAGING:  CXR  CT chest not done    HISTORY OF PRESENT ILLNESS:  I had the pleasure of seeing Susanne Leong in [address] at [facility] in [place]. Briefly, Susanne Leong is a 73 y.o. female with h/o LVAD implant with HM 2 and ESRD requiring HD (now off HD).   She had some recent hospitalizations and additional complications and ultimately decided to discharge home with hospice support. Her UOP increased and repeat labs at home showed improved renal function, and she was discharged from Hospice services for not meeting criteria. She is still at home, living with her daughter, now with home health services in place instead of hospice. Routine lab surveillance revealed ROCIO on CKD, Cr > 7 and Hgb 6. She was asked to present to ER for further assessment as her goals of care were not entirely clear and had been discharged from hospice. REVIEW OF SYSTEMS:  Review of Systems   Constitutional: Positive for malaise/fatigue. Negative for chills, fever and weight loss. Respiratory: Negative for cough. Cardiovascular: Negative for chest pain, palpitations and leg swelling. Gastrointestinal: Negative for constipation, diarrhea, nausea and vomiting. Musculoskeletal:        Arm swelling   Neurological: Negative for dizziness and focal weakness. PHYSICAL EXAM:  Visit Vitals  BP (!) 78/0   Pulse (!) 115   Temp 98.9 °F (37.2 °C)   Resp 25   Ht 5' 7\" (1.702 m)   Wt 253 lb (114.8 kg)   SpO2 95%   BMI 39.63 kg/m²     Physical Exam  Constitutional:       Appearance: She is obese. HENT:      Head: Normocephalic and atraumatic. Eyes:      Conjunctiva/sclera: Conjunctivae normal.      Pupils: Pupils are equal, round, and reactive to light. Cardiovascular:      Comments: LVAD Humm noted on auscultation  Pulmonary:      Effort: Pulmonary effort is normal. No respiratory distress. Breath sounds: Rales present. Abdominal:      General: Bowel sounds are normal. There is no distension. Palpations: Abdomen is soft. Tenderness: There is no abdominal tenderness. Musculoskeletal:         General: Swelling present. Cervical back: Neck supple. Right lower le+ Pitting Edema present. Left lower le+ Pitting Edema present. Skin:     General: Skin is warm and dry. Capillary Refill: Capillary refill takes less than 2 seconds.    Neurological: General: No focal deficit present. Mental Status: She is alert and oriented to person, place, and time. PAST MEDICAL HISTORY:  Past Medical History:   Diagnosis Date    Asthma     Cancer ([facility] [facility]     breast    Cancer ([facility]     endometrial    Congestive heart failure, unspecified     CRI (chronic renal insufficiency)     Depression     Diabetes ([facility]     Hypertension     Severe sepsis with acute organ dysfunction ([facility] 5/11/2021       PAST SURGICAL HISTORY:  Past Surgical History:   Procedure Laterality Date    HX HERNIA REPAIR      HX HYSTERECTOMY      HX MASTECTOMY      IR INSERT NON TUNL CVC OVER 5 YRS  4/26/2021    IR INSERT TUNL CVC W/O PORT OVER 5 YR  5/5/2021       FAMILY HISTORY:  No family history on file. SOCIAL HISTORY:  Social History     Socioeconomic History    Marital status:      Spouse name: Not on file    Number of children: Not on file    Years of education: Not on file    Highest education level: Not on file   Tobacco Use    Smoking status: Never Smoker    Smokeless tobacco: Never Used   Substance and Sexual Activity    Alcohol use: Not Currently     Social Determinants of Health     Financial Resource Strain:     Difficulty of Paying Living Expenses:    Food Insecurity:     Worried About Running Out of Food in the Last Year:     [address] in the Last Year:    Transportation Needs:     Lack of Transportation (Medical):      Lack of Transportation (Non-Medical):    Physical Activity:     Days of Exercise per Week:     Minutes of Exercise per Session:    Stress:     Feeling of Stress :    Social Connections:     Frequency of Communication with Friends and Family:     Frequency of Social Gatherings with Friends and Family:     Attends Congregational Services:     Active Member of Clubs or Organizations:     Attends Club or Organization Meetings:     Marital Status:        LABORATORY RESULTS:  Labs Latest Ref Rng & Units 9/6/2021 9/5/2021 9/4/2021 9/3/2021 9/3/2021 9/2/2021 7/29/2021   WBC 3.6 - 11.0 K/uL 5.4 3. 0(L) 2. 7(L) - 1. 9(L) 3. 1(L) 2. 6(L)   RBC 3.80 - 5.20 M/uL 2.28(L) 2.43(L) 2.13(L) - 2.11(L) 2.66(L) 3.20(L)   Hemoglobin 11.5 - 16.0 g/dL 6.8(L) 7. 4(L) 6. 5(L) 6. 4(L) 6. 3(L) 8. 1(L) 9.1(L)   Hematocrit 35.0 - 47.0 % 21. 4(L) 23. 0(L) 20. 2(L) 20. 5(L) 20. 1(L) 25. 0(L) 28. 6(L)   MCV 80.0 - 99.0 FL 93.9 94.7 94.8 - 95.3 94.0 89   Platelets 775 - 614 K/uL 114(L) 123(L) 117(L) - 109(L) 140(L) 150   Lymphocytes 12 - 49 % - 13 - - - 11(L) -   Monocytes 5 - 13 % - 7 - - - 4(L) 7   Eosinophils 0 - 7 % - 0 - - - 0 0   Basophils 0 - 1 % - 1 - - - 0 1   Albumin 3.5 - 5.0 g/dL 1.5(L) 1.6(L) 1.5(L) - - 1. 9(L) 2. 5(L)   Calcium 8.5 - 10.1 MG/DL 7. 2(L) 6. 8(L) 6. 7(L) - 6. 4(LL) 7. 3(L) 7. 8(L)   Glucose 65 - 100 mg/dL 73 89 86 - 77 91 164(H)   BUN 6 - 20 MG/DL 52(H) 98(H) 103(H) - 97(H) 99(H) 66(H)   Creatinine 0.55 - 1.02 MG/DL 4.98(H) 8.00(H) 7.91(H) - 7.12(H) 7.38(H) 1.98(H)   Sodium 136 - 145 mmol/L 135(L) 137 138 - 136 133(L) 135   Potassium 3.5 - 5.1 mmol/L 4.1 4.4 4.5 - 4.2 5.0 4.0   TSH 0.36 - 3.74 uIU/mL - - 5.24(H) - - - -   LDH 81 - 246 U/L 299(H) 319(H) 279(H) - - - -   Some recent data might be hidden       ALLERGY:  Allergies   Allergen Reactions    Atarax [Hydroxyzine Hcl] Other (comments)     Small doses tolerated. Full dose 10mg causes oversedation.      Benzodiazepines Other (comments)     Respiratory issues        CURRENT MEDICATIONS:    Current Facility-Administered Medications:     0.9% sodium chloride infusion 250 mL, 250 mL, IntraVENous, PRN, Arti Saavedra MD    guaiFENesin ER (MUCINEX) tablet 600 mg, 600 mg, Oral, Q12H, Arti Saavedra MD    HYDROcodone-acetaminophen (NORCO) 5-325 mg per tablet 1 Tablet, 1 Tablet, Oral, Q6H PRN, Prakash Pereira MD    HYDROcodone-acetaminophen (NORCO) 5-325 mg per tablet 2 Tablet, 2 Tablet, Oral, Q6H PRN, Prakash Pereira MD    Warfarin - HOLD for INR 3.8, , Other, ONCE, Prakash Pereira MD    heparin (porcine) 1,000 unit/mL injection 1,900 Units, 1,900 Units, InterCATHeter, DIALYSIS PRN, 1,900 Units at 09/05/21 1430 **AND** heparin (porcine) 1,000 unit/mL injection 1,900 Units, 1,900 Units, InterCATHeter, DIALYSIS PRN, Mando ABBASI MD, 1,900 Units at 09/05/21 1430    hydrALAZINE (APRESOLINE) 20 mg/mL injection 10 mg, 10 mg, IntraVENous, Q4H PRN, Racquel Walter NP    fluconazole (DIFLUCAN) tablet 100 mg, 100 mg, Oral, DAILY, Forrest Amaya MD, 100 mg at 09/06/21 0907    sodium chloride (NS) flush 5-40 mL, 5-40 mL, IntraVENous, Q8H, Forrest Amaya MD, 10 mL at 09/06/21 0446    sodium chloride (NS) flush 5-40 mL, 5-40 mL, IntraVENous, PRN, Forrest Amaya MD    acetaminophen (TYLENOL) tablet 650 mg, 650 mg, Oral, Q6H PRN, 650 mg at 09/05/21 1116 **OR** acetaminophen (TYLENOL) suppository 650 mg, 650 mg, Rectal, Q6H PRN, Forrest Amaya MD    ondansetron (ZOFRAN ODT) tablet 4 mg, 4 mg, Oral, Q8H PRN **OR** ondansetron (ZOFRAN) injection 4 mg, 4 mg, IntraVENous, Q6H PRN, Forrest Amaya MD, 4 mg at 09/06/21 0513    senna (SENOKOT) tablet 8.6 mg, 1 Tablet, Oral, DAILY PRN, Forrest Amaya MD    levETIRAcetam (KEPPRA) tablet 250 mg, 250 mg, Oral, BID, Forrest Amaya MD, 250 mg at 09/06/21 0907    PARoxetine (PAXIL) tablet 20 mg, 20 mg, Oral, DAILY, Forrest Amaya MD, 20 mg at 09/06/21 0907    Warfarin - pharamcy to dose, , Other, Rx Dosing/Monitoring, Arti Naranjo MD    cephALEXin (KEFLEX) capsule 500 mg, 500 mg, Oral, BID, Rosanna Jones MD, 500 mg at 09/06/21 1109    hydrALAZINE (APRESOLINE) tablet 100 mg, 100 mg, Oral, TID, Rosanna Jones MD, 100 mg at 09/05/21 2126    tafamidis cap 61 mg (Patient Supplied), 61 mg, Oral, DAILY, Rosanna Jones MD, 61 mg at 09/06/21 [address] NP  [address] [place] [address] Ave, [address]  Phone: [phone]  Fax: [phone]    PATIENT CARE TEAM:  Patient Care Team:  Danny Kunz NP as PCP - General (Nurse Practitioner)  Nehemias Santiago MD (Cardiology)  Armaan Chacon MD as Physician (Cardiology)  Varinder Marx MD (Cardiothoracic Surgery)  Andi Mcdonough MD (Cardiology)

## 2021-09-06 NOTE — PROCEDURES
Vitals Pre   BP BP:  (LVAD pt) (09/05/21 1150)   HR Pulse (Heart Rate): (!) 121 (09/06/21 1624)   Resp Resp Rate: 27 (09/06/21 1624)   Temp Temp: 98.4 °F (36.9 °C) (09/06/21 1624)   Weight Pre-Dialysis Weight: 114.8 kg (253 lb 1.4 oz) (09/06/21 1445)      09/06/21 1445   Assessment  Type   Assessment Type Shift assessment(DIALYSIS)   Psychosocial   Psychosocial (WDL) X   Patient Behaviors Anxious; Restless; Cooperative   Neuro   Neurologic State Alert; Restless;Irritable   Cardiac   Ventricular Assist Device LVAD [facility])   Cardiac Rhythm Ventricular Paced   Cardiac/Telemetry   Cardiac/Telemetry Monitor On Yes   Box Number [id number]   Alarm Audible Yes   Respiratory   Respiratory (WDL) X   Respiratory Pattern Dyspnea at rest;Accessory muscles; Tachypneic   Abdominal    Abdominal (WDL) X   Abdominal Assessment Obese;Pannus;Soft;Tender; Other (comment)(LVAD)   Appetite Poor   Genitourinary   Genitourinary (WDL) X   Urine Assessment   Urinary Status Voiding;Draining; Fletcher   Urine Color Brown   Edema   LUE Non-pitting   RLE 1+;2+;Pitting   LLE 1+;2+;Pitting   RUE Non-pitting   Generalized Anasarca   Dual Skin Pressure Injury Assessment   Dual Skin Pressure Injury Assessment X   Skin Integumentary   Skin Integumentary (WDL) X   Skin Color Ben   Skin Condition/Temp Dry;Flaky; Warm   Activity and Safety   Activity Level Bed Rest(WHILE ON HD TX)   Neuro   Neuro (WDL) X   Cardiac   Cardiac (WDL) X     POST TX 09/06/21 1815   During Hemodialysis    Pulse (Heart Rate) (!) 137   Resp Rate 24   Fluid Removed (mL) 1500   Post-Dialysis   Heparin Arterial Packing Concentration 1,000 units/ml   Heparin Arterial Packing Volume (ml) 1.9 ml   Heparin Venous Packing Concentration 1,000 units/ml   Heparin Venous Packing Volume (ml) 1.9 ml   Rinseback Volume (ml) 200 ml   Duration of Treatment (hours) 3.5 hours   Patient Response to Treatment Fair   Patient Disposition Other (comment)(remained in room during hd tx)   Condition of Dialyzer Filter Good   Hemodialysis End Time (1815)     POST TX 09/06/21 1815   Vital Signs   Pulse (Heart Rate) (!) 137   Resp Rate 24   MAP (Monitor) 72     Orders   Duration: Start: 5827 End: 1815 Total: 3.5   Dialyzer: Dialyzer/Set Up Inspection: Revaclear (09/06/21 1445)   K Bath: Dialysate K (mEq/L): 3 (09/06/21 1445)   Ca Bath: Dialysate CA (mEq/L): 2.5 (09/06/21 1445)   Na: Dialysate NA (mEq/L): 140 (09/06/21 1445)   Bicarb: Dialysate HCO3 (mEq/L): 35 (09/06/21 1445)   Target Fluid Removal: Goal/Amount of Fluid to Remove (mL): 1000 mL (09/06/21 1445)     Access   Type & Location: [facility] TD, NO S/S INFECTION/COMPLICATION   Comments: ACCESSED PER POLICY, GOOD FLOW TO [facility] LIMB                                       Labs   Lab Results   Component Value Date/Time    Hepatitis A, IgM NONREACTIVE 04/21/2021 10:17 AM    Hepatitis B surface Ag <0.10 09/05/2021 11:36 AM    Hepatitis B surface Ab <3.10 04/27/2021 02:46 PM    Hepatitis B core, IgM NONREACTIVE 04/21/2021 10:17 AM      Obtained/Reviewed  Critical Results Called HGB   Date Value Ref Range Status   09/06/2021 6.8 (L) 11.5 - 16.0 g/dL Final     Potassium   Date Value Ref Range Status   09/06/2021 4.1 3.5 - 5.1 mmol/L Final     Calcium   Date Value Ref Range Status   09/06/2021 7.2 (L) 8.5 - 10.1 MG/DL Final     BUN   Date Value Ref Range Status   09/06/2021 52 (H) 6 - 20 MG/DL Final     Comment:     INVESTIGATED PER DELTA CHECK PROTOCOL     Creatinine   Date Value Ref Range Status   09/06/2021 4.98 (H) 0.55 - 1.02 MG/DL Final     Comment:     INVESTIGATED PER DELTA CHECK PROTOCOL        Meds Given   SEE MAR       Adequacy / Fluid    Total Liters Process: 72.4   Net Fluid Removed: 1000      Comments   Time Out Done:   (Time) 1435   Admitting Diagnosis: ROCIO (acute kidney injury) ([facility] [address]  CHF EXACERBATION   Consent obtained/signed: Informed Consent Verified: Yes (09/06/21 1445)   Machine / RO # Machine Number: [id number] (09/06/21 6699)   Primary Nurse Rpt Pre: TIFFANY Cantrell Primary Nurse Rpt Post: Marielena Go RN   Pt Education: [place], K+, FLUID REMOVAL, [address] [facility] [place] # [id number] dialyzer # [id number]  Tx Summary   Comments:  PT HAS LVAD IN PLACE, UNABLE TO TAKE ACCURATE BP READINGS, WILL PERFORM MAP READINGS W/ DOPLAR. DTR @ BEDSIDE. PT IS RESTLESS, ANXIOUS, APPEARS TO BE IN PAIN, INCREASED RR, PURSED MOUTH BREATHING, C/O PAIN TO R HIP AND SACRAL AREA WHERE WOUND IS. PT REPOSITIONED AND FLOATED ON PILLOWS W/ MINIMAL EFFECT.   @1630 BLOOD TRANSFUSION STARTED. NO S/S ADVERSE REACTION TO BLOOD.    @1745 blood transfuion completed no s/s adverse reactions to transfusion. @1800 pt restless @ times, nad, pt has bipap on and sleeping on occasion     @18[address] PT BLOOD RETURNED W/O ISSUES, LIMBS FLUSHED, LOCKED PER ORDER, NEW CAPS PLACED.  PT TOLERATED TX WELL, DTR REMAINS @ BEDSIDE, BED IN LOWEST POSITON

## 2021-09-06 NOTE — PROGRESS NOTES
0730: Bedside shift change report given to Mo Ventura (oncoming nurse) by Wilfredo Barcenas (offgoing nurse). Report included the following information SBAR and Kardex. - patient asleep after dialysis. Daughter doesn't want her to wake up and will wait to do LVAD dressing change and take out sahu. Problem: Pressure Injury - Risk of  Goal: *Prevention of pressure injury  Description: Document Jaime Scale and appropriate interventions in the flowsheet. Outcome: Progressing Towards Goal  Note: Pressure Injury Interventions:  Sensory Interventions: Minimize linen layers    Moisture Interventions: Absorbent underpads    Activity Interventions: Pressure redistribution bed/mattress(bed type)    Mobility Interventions: Pressure redistribution bed/mattress (bed type)    Nutrition Interventions: Document food/fluid/supplement intake    Friction and Shear Interventions: Apply protective barrier, creams and emollients              Problem: Falls - Risk of  Goal: *Absence of Falls  Description: Document Godfrey Fall Risk and appropriate interventions in the flowsheet.   Outcome: Progressing Towards Goal  Note: Fall Risk Interventions:  Mobility Interventions: Communicate number of staff needed for ambulation/transfer    Mentation Interventions: Adequate sleep, hydration, pain control, Family/sitter at bedside    Medication Interventions: Evaluate medications/consider consulting pharmacy    Elimination Interventions: Call light in reach    History of Falls Interventions: Room close to nurse's station         Problem: Patient Education: Go to Patient Education Activity  Goal: Patient/Family Education  Outcome: Progressing Towards Goal

## 2021-09-06 NOTE — PROGRESS NOTES
[facility]   [address] [facility] [address], [facility]  Phone: [phone]   [phone]       Nephrology Progress Note  Carolina Gonzalez     1950     [id number]  Date of Admission : 9/2/2021 09/06/21    CC: Follow up for ROCIO       Assessment and Plan   ROCIO on CKD:  - appears to have ATN . UA : hematuria and proteinuria   -Restarted on dialysis on 9/5/2021 using R IJ permacath  -HD #2 today    CKD IV:  - baseline Cr around 2 at baseline    Hypocalcemia : will be corrected w/ HD     Chronic systolic CHF, stage C NYHA class IV  Combined ischemic and nonischemic cardiomyopathy  S/p HM 2 LVAD implant 4/15/2017 by Dr. 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      Chronic anemia :   ATTR amyloidosis  - check iron studies  -On HEIKE     Hx of Sz d/o:  - on keppra     Chronic A. Fib.     History of endometrial Ca  Obesity  COPD    Discussed with patient and daughter, RN     Interval History:  seem and examined. She had profuse bleeding from her left IJ CVC despite epinephrine injection. Hemoglobin is low and is scheduled for transfusion. She tolerated her first dialysis treatment very well. Her permacath was functional after TPA. Review of Systems: A comprehensive review of systems was negative.     Current Medications:   Current Facility-Administered Medications   Medication Dose Route Frequency    0.9% sodium chloride infusion 250 mL  250 mL IntraVENous PRN    guaiFENesin ER (MUCINEX) tablet 600 mg  600 mg Oral Q12H    HYDROcodone-acetaminophen (NORCO) 5-325 mg per tablet 2 Tablet  2 Tablet Oral Q6H PRN    heparin (porcine) 1,000 unit/mL injection 1,900 Units  1,900 Units InterCATHeter DIALYSIS PRN    And    heparin (porcine) 1,000 unit/mL injection 1,900 Units  1,900 Units InterCATHeter DIALYSIS PRN    hydrALAZINE (APRESOLINE) 20 mg/mL injection 10 mg  10 mg IntraVENous Q4H PRN    fluconazole (DIFLUCAN) tablet 100 mg  100 mg Oral DAILY    sodium chloride (NS) flush 5-40 mL  5-40 mL IntraVENous Q8H    sodium chloride (NS) flush 5-40 mL  5-40 mL IntraVENous PRN    acetaminophen (TYLENOL) tablet 650 mg  650 mg Oral Q6H PRN    Or    acetaminophen (TYLENOL) suppository 650 mg  650 mg Rectal Q6H PRN    ondansetron (ZOFRAN ODT) tablet 4 mg  4 mg Oral Q8H PRN    Or    ondansetron (ZOFRAN) injection 4 mg  4 mg IntraVENous Q6H PRN    senna (SENOKOT) tablet 8.6 mg  1 Tablet Oral DAILY PRN    levETIRAcetam (KEPPRA) tablet 250 mg  250 mg Oral BID    PARoxetine (PAXIL) tablet 20 mg  20 mg Oral DAILY    Warfarin - pharamcy to dose   Other Rx Dosing/Monitoring    cephALEXin (KEFLEX) capsule 500 mg  500 mg Oral BID    hydrALAZINE (APRESOLINE) tablet 100 mg  100 mg Oral TID    tafamidis cap 61 mg (Patient Supplied)  61 mg Oral DAILY      Allergies   Allergen Reactions    Atarax [Hydroxyzine Hcl] Other (comments)     Small doses tolerated. Full dose 10mg causes oversedation.  Benzodiazepines Other (comments)     Respiratory issues       Objective:  Vitals:    Vitals:    09/06/21 0553 09/06/21 0917 09/06/21 1005 09/06/21 1157   BP:       Pulse: (!) 121 (!) 122 (!) 125 (!) 115   Resp:  26     Temp:  99 °F (37.2 °C)     TempSrc:       SpO2:  95%     Weight:       Height:         Intake and Output:  09/06 0701 - 09/06 1900  In: 100 [P.O.:100]  Out: -   09/04 1901 - 09/06 0700  In: 2133 [P.O.:1000;  I.V.:826.7]  Out: 675 [Urine:175]    Physical Examination:    General: Obese   Neck:  Supple, no mass  Resp:  Lungs CTA B/L, no wheezing , normal respiratory effort  CV:  VAD sounds,  no murmur or rub, no LE edema  GI:  Soft, NT, + Bowel sounds, no hepatosplenomegaly  Neurologic:  Non focal  Psych:             AAO x 3 appropriate affect   Skin:  No Rash  :  Fletcher +    []    High complexity decision making was performed  []    Patient is at high-risk of decompensation with multiple organ involvement    Lab Data Personally Reviewed: I have reviewed all the pertinent labs, microbiology data and radiology studies during assessment. Recent Labs     09/06/21 0457 09/05/21  0541 09/04/21  1710   * 137 138   K 4.1 4.4 4.5    107 106   CO2 23 20* 20*   GLU 73 89 86   BUN 52* 98* 103*   CREA 4.98* 8.00* 7.91*   CA 7.2* 6.8* 6.7*   ALB 1.5* 1.6* 1.5*   ALT 7* <6* <6*   INR 3.8* 3.6* 3.3*     Recent Labs     09/06/21 0457 09/05/21  0541 09/04/21  1710   WBC 5.4 3.0* 2.7*   HGB 6.8* 7.4* 6.5*   HCT 21.4* 23.0* 20.2*   * 123* 117*     No results found for: SDES  Lab Results   Component Value Date/Time    Culture result: NO GROWTH 3 DAYS 09/03/2021 07:16 PM    Culture result: CANDIDA ALBICANS (A) 05/13/2021 11:29 AM    Culture result: RARE DIPHTHEROIDS (A) 05/12/2021 04:29 AM    Culture result: LIGHT YEAST, (APPARENT CANDIDA ALBICANS) (A) 05/11/2021 07:09 PM    Culture result: LIGHT NORMAL RESPIRATORY BETO 05/11/2021 07:09 PM     Recent Results (from the past 24 hour(s))   LD    Collection Time: 09/06/21  4:57 AM   Result Value Ref Range     (H) 81 - 795 U/L   METABOLIC PANEL, COMPREHENSIVE    Collection Time: 09/06/21  4:57 AM   Result Value Ref Range    Sodium 135 (L) 136 - 145 mmol/L    Potassium 4.1 3.5 - 5.1 mmol/L    Chloride 103 97 - 108 mmol/L    CO2 23 21 - 32 mmol/L    Anion gap 9 5 - 15 mmol/L    Glucose 73 65 - 100 mg/dL    BUN 52 (H) 6 - 20 MG/DL    Creatinine 4.98 (H) 0.55 - 1.02 MG/DL    BUN/Creatinine ratio 10 (L) 12 - 20      GFR est AA 10 (L) >60 ml/min/1.73m2    GFR est non-AA 9 (L) >60 ml/min/1.73m2    Calcium 7.2 (L) 8.5 - 10.1 MG/DL    Bilirubin, total 0.6 0.2 - 1.0 MG/DL    ALT (SGPT) 7 (L) 12 - 78 U/L    AST (SGOT) 20 15 - 37 U/L    Alk.  phosphatase 71 45 - 117 U/L    Protein, total 6.6 6.4 - 8.2 g/dL    Albumin 1.5 (L) 3.5 - 5.0 g/dL    Globulin 5.1 (H) 2.0 - 4.0 g/dL    A-G Ratio 0.3 (L) 1.1 - 2.2     NT-PRO BNP    Collection Time: 09/06/21  4:57 AM   Result Value Ref Range    NT pro-BNP >35,000 (H) <125 PG/ML   PROTHROMBIN TIME + INR    Collection Time: 09/06/21  4:57 AM   Result Value Ref Range    INR 3.8 (H) 0.9 - 1.1      Prothrombin time 37.6 (H) 9.0 - 11.1 sec   CBC W/O DIFF    Collection Time: 09/06/21  4:57 AM   Result Value Ref Range    WBC 5.4 3.6 - 11.0 K/uL    RBC 2.28 (L) 3.80 - 5.20 M/uL    HGB 6.8 (L) 11.5 - 16.0 g/dL    HCT 21.4 (L) 35.0 - 47.0 %    MCV 93.9 80.0 - 99.0 FL    MCH 29.8 26.0 - 34.0 PG    MCHC 31.8 30.0 - 36.5 g/dL    RDW 18.2 (H) 11.5 - 14.5 %    PLATELET 640 (L) 334 - 400 K/uL    MPV 9.3 8.9 - 12.9 FL    NRBC 0.0 0  WBC    ABSOLUTE NRBC 0.00 0.00 - 0.01 K/uL   RBC, ALLOCATE    Collection Time: 09/06/21 11:45 AM   Result Value Ref Range    HISTORY CHECKED? Historical check performed            Total time spent with patient:  xxx   min. Care Plan discussed with:  Patient     Family      RN      Consulting Physician [facility]        I have reviewed the flowsheets. Chart and Pertinent Notes have been reviewed. No change in PMH ,family and social history from Consult note.       Stephane Herrera MD

## 2021-09-06 NOTE — PROGRESS NOTES
Pharmacist Note  Warfarin Dosing  Consult provided for this 73 y. o.female to manage warfarin for LVAD    INR Goal: 1.8-2.5 (2/2 GIB)     Home regimen/ tablet size: 2mg qod alternating with 3 mg every other day daily    Drugs that may increase INR: None  Drugs that may decrease INR: None  Other current anticoagulants/ drugs that may increase bleeding risk: None  Risk factors: Age > 65, dialysis  Daily INR ordered: YES    Recent Labs     09/06/21  0457 09/05/21  0541 09/04/21  1710   HGB 6.8* 7.4* 6.5*   INR 3.8* 3.6* 3.3*     Date               INR                  Dose  9/2   2.4     3 mg   9/3                  2.4                   3 mg   9/4                  3.3                   HOLD  9/5                  3.6                   HOLD  9/6      3.8      HOLD                                                                               Assessment/ Plan: Will HOLD warfarin for INR = 3.8  Pharmacy will continue to monitor daily and adjust therapy as indicated.        Temo Marrero, PharmD  Clinical Pharmacist, Orthopedics and Med/Surg  [address] [facility] ()

## 2021-09-06 NOTE — PROGRESS NOTES
visit for routine follow up, palliative consult. Patient resting in recliner at bedside, good eye contact, smiling, friendly. Patient's daughter, Jeronimo Dougherty, also present. Provided spiritual presence and listening as she spoke of her present thoughts, feelings, and concerns. Spoke of her health. Says she feels she is making progress little by little. Hopes to be able to be discharged home soon. Said she is comfortable and not in need of anything and expressed gratitude to the staff for all of the care she has bene receiving. Informed her of availability of  and pastoral care services. Both appeared comforted as a result of this visit and expressed gratitude for this visit. Rev.  Teena Estes, MDiv, [facility], [facility]   paging service: [phone])

## 2021-09-07 NOTE — PROGRESS NOTES
1930: Bedside and Verbal shift change report given to Michael Jenkins, [address] [place] [address] (oncoming nurse) by Charmayne Gallon, RN (offgoing nurse). Report included the following information SBAR, Kardex, ED Summary, Intake/Output, MAR, Recent Results and Cardiac Rhythm VPACED.     2200: Pt was to sedated to take oral meds. Gave prn IV hydralazine for elevated maps. Pt's tele alarm rang out all night. Maggie Jean notified and came to the floor, assessed pt. No new orders. Pt's vitals stable. Will continue to monitor. 0730: Bedside and Verbal shift change report given to Alexandra Jauregui RN (oncoming nurse) by Michael Jenkins RN (offgoing nurse). Report included the following information SBAR, Kardex, ED Summary, Intake/Output, MAR, Recent Results and Cardiac Rhythm VPACED.

## 2021-09-07 NOTE — PROGRESS NOTES
ICU TEAM Progress Note    Name: Marcelino Dudley   : 1950   MRN: [id number]   Date: 2021      Assessment:   Reason for ICU Admission: stable VT     Brief HPI: 73 y.o. female who presents acute renal failure referred by heart failure center for fluid overload, and for abnormal kidney function.  Patient does have LVAD Heartmate 2.  Her past medical history includes heart failure s/p LVAD, COPD, CKD stage 3, pulmonary HTN, Depression, HTN, and HLD.  As per daughter at bedside patient was previously followed by LVAD team and then was in hospice then stopped all the medications did do well she still had a permacath and gradually after doing well came out of hospice.  Yesterday home health nurses came drawn the labs and after reviewing the labs they were sent to the ED for acute renal failure.  As discussed before patient has CKD as well and had a permacath currently not in use. Patient was on the floor this morning when seh went into stable VT. I attempted 100mg of lidocaine x2 with limited improvement in HR. She was transferred to the CVICU for close monitoring. She is due to receive amio bolus/gtt. I discussed the case with her daughter and Emilie Wilson. We reviewed the circumstances around her mother being discharged to hospice two months and subsequent discharge from the hospice service after she began to improve clinically. I explained that given the irreversible nature of her other's heart failure, that these episodes of unstable arrhythmia will only become more frequent despite presence of her LVAD. I feel that her mothers improvement was an aberrancy and that overall her clinical trajectory has been downward over the last year - and more so in the last few months. She was febrile this afternoon - there is concern over chronic sternal wound infection versus line infection. Starting emperic abx and checking lac/procal.     She was admitted to the hospital as a full code.  I explained that given the previous conversations and decisions around hospice, that returning her status to DNR would make sense. She is going to think about it for a while today and I will follow up with her later this afternoon. Addendum: discussed case with patient and her daughter again later in the day. Patient seems to understand the extent of her disease. She stated that she is tired. She does not wish to have further medical treatments. I asked if she remembered her conversations previously about hospice and she nodded. I asked her if transitioning away from aggressive medical care back to hospice care was what she desired. She emphatically agreed. Following this I called her daughter who agreed with her mother's decision. She request that we revert her code status back to DNR/DNI. I offered to put in transfer order to other medical floor so that she could again be with her mother. Plan to stop vasoactive/anti-arrythmics and antibiotics. - Acute Metabolic Encephalopathy  - Coronary Artery Disease  - Non-Ischemic Cardiomyopathy  - Acute on Chronic Heart Failure  - LVAD Dependence  - Long Term Anticoagulation  - End Stage Renal Disease on HD  - COPD  - Morbid Obesity  - Diabetes  - History of Breast Cancer    Plan:     Neuro: avoid antihistamines. Continue keppra, paxil  Pulm: continue AVAPS on home trilogy. Supplemental o2 bleed in as needed. CXR  Cardiac: HF following. Continue LVAD. Repeat TTE. Amio at 1. EKG. Hydralazine. Tafamidis for cardiac amyloid  Renal: Nephrology following. HD per neph. Retacrit  GI: Reg diet  ID: draw BCx, start vanc/cefepime. Check procal/lactate. Diflucan.    Heme: AC per HF - on coumadin  Endo: DANIEL  MSK: SCDs    F - Feeding:  Yes   A - Analgesia: None  S - Sedation: None  T - DVT Prophylaxis: Coumadin   C - Code Status: Full Code  H - Head of Bed: > 30 Degrees  U - Ulcer Prophylaxis: Not at this time   G - Glycemic Control: Insulin  S - Spontaneous Breathing Trial: N/A  B - Bowel Regimen: None needed at this time  I - Indwelling Catheter:   Tubes: None  Lines: Peripheral IV, Central Line, Antonio and Pacemaker  Drains:   D - De-escalation of Antibiotics:     Subjective:   Overnight Events:   2021 - remains in VT following lido/amio. Ongoing discussion with daughter about resuscitation plans moving forward    Objective:     Visit Vitals  /72   Pulse (!) 130   Temp 98.7 °F (37.1 °C)   Resp 25   Ht 5' 7\" (1.702 m)   Wt 140 kg (308 lb 10.3 oz)   SpO2 91%   BMI 48.34 kg/m²      O2 Device: None (Room air) Temp (24hrs), Av.2 °F (37.3 °C), Min:98.4 °F (36.9 °C), Max:102.8 °F (39.3 °C)    Hemodynamics:   PAP:   CO:     Wedge:   CI:     CVP:    SVR:       PVR:       Intake/Output:     Intake/Output Summary (Last 24 hours) at 2021 1246  Last data filed at 2021 2345  Gross per 24 hour   Intake 1420.8 ml   Output    Net 1420.8 ml       Physical Exam:  General:  fatigued, cooperative, no distress, appears older than stated age, morbidly obese  Neurologic:  grossly non-focal  Neck:  RIJ CVC w dried blood  Lungs:  clear to auscultation bilaterally  Heart: tachycardic, LVAD hum  Abdomen:  soft, non-tender. Bowel sounds normal. No masses,  no organomegaly  Skin: scattered ecchymosis    24h Labs & Data: Reviewed    Medications: Reviewed    TTE 9/3:  · LVEF is 15 - 20%. · RV: Mildly dilated right ventricle. Mildly reduced systolic function. Pacer/ICD present. · AV: Mild aortic valve regurgitation is present. · PA: Mild to moderate pulmonary hypertension. Pulmonary arterial systolic pressure is 50 mmHg. SPECIAL EQUIPMENT: LVAD, HD    DISPOSITION: Stay in ICU    CRITICAL CARE CONSULTANT NOTE  I had a face to face encounter with the patient, reviewed and interpreted patient data including clinical events, labs, images, vital signs, I/O's, and examined patient.   I have discussed the case and the plan and management of the patient's care with the consulting services, the bedside nurses and the respiratory therapist.      NOTE OF PERSONAL INVOLVEMENT IN CARE   This patient has a high probability of imminent, clinically significant deterioration, which requires the highest level of preparedness to intervene urgently. I participated in the decision-making and personally managed or directed the management of the following life and organ supporting interventions that required my frequent assessment to treat or prevent imminent deterioration. I personally spent 60 minutes of critical care time. This is time spent at this critically ill patient's bedside actively involved in patient care as well as the coordination of care and discussions with the patient's family. This does not include any procedural time which has been billed separately.     Baltazar Hannon MD  Staff Intensivist/Anesthesiologist  [facility]  9/7/2021

## 2021-09-07 NOTE — PROGRESS NOTES
responded to RRT called for Ms. Amelia Georges. Pt's daughter Sunitha Monet was present at bedside as staff was offering care. Spoke with pt's daughter and explored her need. She is processing the news that plan is to transfer pt to CVICU, and Sunitha Monet is unable to visit in that unit at this time. Normalized her feelings and asked how we could best support. Melinda requests that if/when pt is transferred, that staff continue to let patient know that Sunitha Monet is thinking of her.       [address]ino, MDiv, [facility]

## 2021-09-07 NOTE — PROGRESS NOTES
Currently, wide complex tachycardia with RBBB. This could be VT but appears to be possible atrial flutter 2:1 av conduction as well               She had hx of intolerance to amiodarone and mexiletine for VT. She has renal failure, being dialyzed, has LVAD, being worked up for sepsis and endocarditis, and considered for hospice.   There are no other antiarrhythmic beside amiodarone and electrical cardioversion for her arrhythmia  I recommended Dr Evens Ackerman to use IV amiodarone bolus and drip to terminate this arrhythmia    She may not want PO amiodarone long term but for this acute event, this is the only drug that she can use

## 2021-09-07 NOTE — HOSPICE
[facility] Help to Those in Need  [phone]     Patient Name: Kwadwo Daley  YOB: 1950  Age: 73 y.o. [facility] RN Note:  Hospice consult received, reviewing chart. Will follow up with Unit Nurse and Care Manager to discuss plan of care, patient status and discharge disposition within the hour. 16:23: Reviewed patient with Dr. Mitzi Canavan, Hospice Medical Director. Plan to visit patient bedside, and arrange hospice meeting for Wednesday 1/8/4580 with Dr. Angelica Davila and Hospice Team.    17:00: Bedside visit with patient. Pt eyes closed wearing home Trilogy. Update received from bedside nurse and Dr. Landon Stinson. Dr. Landon Stinson shared that pt has told him that she is tired, and would like to stop escalation of treatments. Dr. Landon Stinson shared that he also spoke with pt daughter, Armand Arriaza, over the phone, who expressed she respects her mother's decisions for comfort focused care. 17:10: Phone call with patient daughter, Armand Arriaza. Briefly reviewed past few months with patient living with her daughter. Shasha Henry states that she is open to bringing her mother back home with hospice services. Discussed [facility] as an option, and Shasha Henry states that she would appreciate that. Plan: Review patient status with Dr. Angelica Davila in the am. Plan to meet with patient and daughter to review Hospice Plan of care. Daughter has Hospice contact info. Thank you for the opportunity to be of service to this patient.     Sidra Young RN, [place] [address] Nurse Liaison  [phone] Mobile  [phone] Office  Available on [employer]

## 2021-09-07 NOTE — PROGRESS NOTES
7044: TRANSFER - IN REPORT:    Verbal report received from [facility] RN(name) on Drea Wade  being received from [facility](unit) for urgent transfer    Report consisted of patients Situation, Background, Assessment and   Recommendations(SBAR). Information from the following report(s) SBAR and Cardiac Rhythm V Debbie King' was reviewed with the receiving nurse. Opportunity for questions and clarification was provided. Assessment completed upon patients arrival to unit and care assumed. 1000: Intensivist and Dr Ned Roberts at bedside. Verbal order from Intensivist for Amio bolus and gtt starting at 1.    1105: Intensivist at bedside. Verbal orders to draw blood cultures, ABG, labs (procal, lactic), and start pt on Cefepime and Vanc    1140: RT at bedside - unable to perform arterial stick for ABG. Received venous blood - ran for pH. PH 7.4. RT to consult team to re-attempt stick. 1146: Echo tech at bedside    1400: Intensivist spoke to family - daughter and patient decided to proceed with DNR/I. Hospice consult placed. 1630: Amio gtt stopped per telephone order from Rose Shelby NP    2000: Bedside and Verbal shift change report given to [facility] (oncoming nurse) by Anahi Lui (offgoing nurse). Report included the following information SBAR and Cardiac Rhythm V Tach.

## 2021-09-07 NOTE — CONSULTS
[facility]iology [facility] Initial Visit Note      Subjective:      Drea Wade is a 73 y.o. female patient who is evaluated for wide complex tachycardia  She has renal failure, anemia and possibly septic ATN  LOBITO was being considered  She has [address] 233 dual chamber ICD (DOI 2010).    NICM with biventricular failure, s/p LVAD in 2017 at [facility].  NYHA III chronic systolic CHF. 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     LV lead is turned down so LV pacing < 1%  No more left phrenic nerve stimulation       Previous:  Pacing rate 70 bpm to suppress PVCs.     Intolerant of spironolactone due to hyperkalemia.     [employer] showed 1 potentially positive pathogenetic variant and 3 VUS      Started mexiletine on 2020 during a recent admission for control of recurrent VT.  Her daughter then reported that she was unsteady, fatigued, nauseated, & shaky with Levaquin & mexiletine.  Antibiotic changed to cefepime IV.  Stopped mexiletine 2020, started ranolazine 500 mg po bid on 2020.  She did have subsequent run NSVT (8-15 beats, very wide complex, rate 150 bpm).     Admitted 2020 after a fall, suffered leg wound bleeding.  She had been discharged on Levaquin, still treated for leg cellulitis (Morganella morganii), has renal failure, advanced systolic CHF, & anemia.     Treated for abscess of sternal wound since late 2019.     S/p PCI approx 2017.  Reports history of Plavix resistance.     Previously followed by [facility], now sees Dr. Ned Roberts.     Mother with CAD,  age 76.  Father  age [de-identified], CAD & MI. Bobie Fontan with valve replacement.                      Problem List             Codes Class Noted     HAIRSTON (dyspnea on exertion) ICD-10-CM: R06.00  ICD-9-CM: 786.09   2021           Incontinence in female ICD-10-CM: R32  ICD-9-CM: 932. 6   2020           LVAD (left ventricular assist device) present [facility]) ICD-10-CM: Z95.811  ICD-9-CM: V43.21   12/8/2020           Hospital discharge follow-up ICD-10-CM: Z09  ICD-9-CM: V67.59   12/8/2020           ICD (implantable cardioverter-defibrillator) battery depletion ICD-10-CM: Z45.02  ICD-9-CM: V53.32   12/8/2020           Coagulopathy (HCC) ICD-10-CM: D68.9  ICD-9-CM: 286.9   11/10/2020           Open wound of left lower leg ICD-10-CM: P85.167P  ICD-9-CM: 891.0   11/10/2020           Anemia ICD-10-CM: D64.9  ICD-9-CM: 326. 9   11/9/2020           ROCIO (acute kidney injury) ([facility] ICD-10-CM: N17.9  ICD-9-CM: 652. 9   11/9/2020           NSVT (nonsustained ventricular tachycardia) ([facility]) ICD-10-CM: I47.2  ICD-9-CM: 427.1   11/6/2020           NICM (nonischemic cardiomyopathy) ([facility] ICD-10-CM: I42.8  ICD-9-CM: 425. 4   11/4/2020           Coronary artery disease involving native coronary artery of native heart without angina pectoris ICD-10-CM: I25.10  ICD-9-CM: 414.01   11/4/2020           JED on CPAP ICD-10-CM: G47.33, Z99.89  ICD-9-CM: 327.23, V46.8   11/4/2020           Chronic venous insufficiency ICD-10-CM: I87.2  ICD-9-CM: 459.81   11/4/2020           Ulcer of right foot, limited to breakdown of skin ([facility] ICD-10-CM: J12.394  ICD-9-CM: 707.15   11/4/2020           Acute on chronic systolic CHF (congestive heart failure) ([facility]) ICD-10-CM: I50.23  ICD-9-CM: 428.23, 428.0   10/27/2020           Obesity, morbid ([facility] ICD-10-CM: E66.01  ICD-9-CM: 278.01   10/1/2020                         Allergies   Allergen Reactions    Benzodiazepines Other (comments)       Respiratory issues      No current facility-administered medications on file prior to encounter. Current Outpatient Medications on File Prior to Encounter   Medication Sig Dispense Refill    potassium chloride (KLOR-CON) 10 mEq tablet Take 4 Tablets by mouth daily. 60 Tablet 2    magnesium oxide (MAG-OX) 400 mg tablet Take 1 Tablet by mouth daily.  30 Tablet 2    isosorbide mononitrate ER (IMDUR) 30 mg tablet Take 1 Tablet by mouth daily. 30 Tablet 2    PARoxetine (PAXIL) 20 mg tablet Take 1 Tablet by mouth daily. Indications: anxiousness associated with depression 30 Tablet 2    levETIRAcetam (KEPPRA) 500 mg tablet Take 0.5 Tablets by mouth two (2) times a day. Take half a tablet two times a day  Indications: seizures 60 Tablet 2    bumetanide (BUMEX) 1 mg tablet Take 1 Tablet by mouth daily. Additional doses as directed 45 Tablet 1    warfarin (COUMADIN) 3 mg tablet Take 3 mg by mouth daily.  cephALEXin (KEFLEX) 500 mg capsule Take 500 mg by mouth daily. 500mg once daily after completing three times daily dose      hydrOXYzine HCL (ATARAX) 10 mg tablet Take 2.5 mg by mouth every six (6) hours as needed for Itching.  HYDROcodone-acetaminophen (NORCO) 5-325 mg per tablet Take 1 Tablet by mouth every four (4) hours as needed for Pain. 60 Tablet 4    loperamide (IMODIUM) 1 mg/5 mL solution Take 2 mg by mouth four (4) times daily as needed for Diarrhea. 60 mL 4    budesonide-formoteroL (SYMBICORT) 160-4.5 mcg/actuation HFAA Take 2 Puffs by inhalation as needed.  tafamidis 61 mg cap Take 61 mg by mouth daily.  albuterol (PROVENTIL HFA, VENTOLIN HFA, PROAIR HFA) 90 mcg/actuation inhaler Take 2 Puffs by inhalation every four (4) hours as needed for Wheezing.  hydrALAZINE (APRESOLINE) 25 mg tablet Take 100 mg by mouth three (3) times daily. MAP >80      Wheel Chair niurka Daily use due to severe class IV HF symptoms, deconditioning and high fall risk. Pt has severe debility from her heart failure and is not able to ambulate independently. Her mobility limitation impairs her ability to participate in ADLs including toileting, feeding, dressing and bathing. These mobility limitations cannot be resolved with the use of a cane or walker. She is not capable of propelling a standard wheelchair, but is able to propel a lightweight chair.   Use of a lightweight wheelchair can resolve her mobility deficits. She has the strength to maneuver a lightweight chair. 1 Each 0    melatonin 3 mg tablet Take 1 Tablet by mouth nightly. (Patient not taking: Reported on 9/2/2021) 30 Tablet 1    traZODone (DESYREL) 50 mg tablet Take 25 mg by mouth nightly. Give 1/2 (half) tablet by mouth at bedtime  Indications: insomnia and anxiety (Patient not taking: Reported on 7/2/2021)      morphine (ROXANOL) 100 mg/5 mL (20 mg/mL) concentrated solution Take 5 mg by mouth every three (3) hours as needed for Shortness of Breath (Unrelieved by Symbicort). (Patient not taking: Reported on 7/2/2021)      simethicone (MYLICON) 40 ZL/4.2 mL drops Take 40 mg by mouth Before breakfast, lunch, dinner and at bedtime. Indications: gas (Patient not taking: Reported on 9/2/2021) 30 mL 4    haloperidol (HALDOL) 2 mg/mL oral concentrate Take 1 mg by mouth every six (6) hours as needed for Agitation. (Patient not taking: Reported on 7/2/2021)      prochlorperazine (COMPAZINE) 10 mg tablet Take 10 mg by mouth every six (6) hours as needed for Nausea or Vomiting. (Patient not taking: Reported on 7/2/2021)      acetaminophen (TYLENOL) 650 mg suppository Insert 650 mg into rectum every six (6) hours as needed for Fever. (Patient not taking: Reported on 9/2/2021)      hyoscyamine SL (LEVSIN/SL) 0.125 mg SL tablet Take 0.125 mg by mouth every four (4) hours as needed for Secretions. (Patient not taking: Reported on 7/2/2021)      [DISCONTINUED] levETIRAcetam (KEPPRA) 500 mg tablet Take 250 mg by mouth two (2) times a day.               Past Medical History:   Diagnosis Date    Asthma      Cancer ([facility]       breast    Cancer ([facility]       endometrial    Congestive heart failure, unspecified      CRI (chronic renal insufficiency)      Depression      Diabetes ([facility]      Hypertension              Past Surgical History:   Procedure Laterality Date    HX HERNIA REPAIR        HX HYSTERECTOMY        HX MASTECTOMY          No family history on file. Social History           Tobacco Use    Smoking status: Never Smoker    Smokeless tobacco: Never Used   Substance Use Topics    Alcohol use: Not Currently         Review of Systems: limited due to poor mental status     Objective:      Visit Vitals  BP (!) 78/0   Pulse (!) 134   Temp 99.3 °F (37.4 °C)   Resp 28   Ht 5' 7\" (1.702 m)   Wt 308 lb 10.3 oz (140 kg)   SpO2 92%   BMI 48.34 kg/m²        Physical Exam:   Constitutional: Well-developed and well-nourished. + respiratory distress. Head: Normocephalic and atraumatic. Eyes: closed  ENT: high Oxygen flow cannula  Neck: no JVD seen   Cardiovascular: LVAD sounds. Pulmonary/Chest: heavy breathing, tachypneic   Abdominal morbidly bese. Musculoskeletal: no ROM at this time    Vasc/lymphatic: 4+ bilat lower extremity edema. Neurological: awoke to voice but no interaction   Skin Right chest ICD         Assessment/Plan:      Imaging/Studies:  Cardiac amyloid scan (11/04/2020): PYP scan strongly suggestive for ATTR cardiac amyloidosis.     Echo (11/02/2020): LVEF 15-20%, LVAD in place.  Mildly dilated RV with mildly reduced RVEF.  Mildly dilated LA.  Aortic valve sclerosis without stenosis.     LHC (11/09/2018): 2 vessel disease with 90% OM, 80% PDA, DSA to PDA branch     09/02/21    ECHO ADULT FOLLOW-UP OR LIMITED 09/07/2021 9/7/2021    Interpretation Summary  · LV: Estimated LVEF is <15%. Moderately dilated left ventricle. Severely and globally reduced systolic function. The findings are consistent with dilated cardiomyopathy. · RV: Dilated right ventricle. Moderately to severely reduced systolic function. Pacer/ICD present. · MV: Mitral valve non-specific thickening. · AV: Mild aortic valve regurgitation is present. · RA: Mildly dilated right atrium. · TV: Moderate to severe tricuspid valve regurgitation is present. Pulmonary hypertension not suggested by Doppler findings. Signed by:  Miguel Angel Dupree MD on 9/7/2021  2:11 PM         [employer] biventricular ICD (DOI 07/09/2010) Low LV pacing < 1% consistent with previous; LV lead output had to be turned down due to persistent phrenic nerve stim. Battery 1.5 years       NICM: + evidence for aTTR cardiac amyloidosis. LVAD since 2017, speed limited by VT. Intolerant of beta blocker, ACEi/ARB/ARNi       VT: off label ranolazine as previously prescribed, was intolerant of mexiletine and amiodarone. Wide complex tachycardia with RBBB. This could be VT but can also be atrial flutter 2:1 av conduction   Lidocaine did not have effect  IV amiodarone helped slow down rate  Currently off amiodarone because of comfort care decision                     Thank you for involving me in this patient's care and please call with further concerns or questions.        Chet Vidales M.D.   Electrophysiology/Cardiology  [facility] [place]  [address] [place] [address]  [phone]

## 2021-09-07 NOTE — TELEPHONE ENCOUNTER
Telephone Call RE:  Lab Reminder      Outcome:     [x] Patient verbalizes understanding    [] Unable to reach   [] Left message           Daughter states patient is in hospital at this time.     Rashel Urbina

## 2021-09-07 NOTE — PROGRESS NOTES
[facility] in [place], [place]  Heart Failure Inpatient Progress Note    Patient name: Ruby Moore  Patient : 1950  Patient MRN: [id number]  Date of service: 21    Primary care physician: Alexis Delgadillo NP  Primary general cardiologist:    Primary F cardiologist: Nomi Luna MD      Reason for Referral:  Management of heart failure and LVAD     Chief Complaint   Patient presents with    Abnormal Lab Results        PLAN:  · Admitted with ROCIO on CKD, initiated on HD    · Recommend Palliative Care consultation to clarify goals and limits of care; patient previously enrolled in Hospice and had voiced wishes to avoid hospitalization or invasive procedures  · Clarification of code status per primary team     Continue LVAD at current speed, TTE negative for vegetations   Repeat TTE today with new onset VT  EKG today   HD per nephrology- none today   Using Bipap- management per intensivist   Management of infection per Intensivist  Repeat blood cultures  Starting Vanc/Cefepime   No BB due to RV failure    No ACEi/ARB/ARNI/MRA due to renal failure and ATTR   Continue hydralazine 100 mg PO TID   Cont Tafamidis 61mg daily  Hold warfarin d/t supratherapeutic INR   US of LUE d/t unilateral edema   Strict I/O, daily weights   Change Keppra, Diflucan, hydralazine to IV    Remainder of care per primary.      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 Tereasa Frankel)  · C/b delayed extubation due to severe COPD  · C/b critical illness polyneuropathy  · C/b prolonged hospitalization post-LVAD, 55 days  · C/b sternal wound infection s/p debridement (by Dr. Lady Rivera) s/p wound vac  · C/b sacral ulcer  · Would culture positive for Staph aureus, not MRSA  · C/b LVAD site drainage, improved  S/p CRT-ICD  · ICD fired due to electrolyte imbalance (2011)  H/o breast cancer (1992)   · s/p bilateral mastectomy/chemo and endometrial cancer s/p hysterectomy  · Lymphedema of LLE due to cancer treatment  Severe COPD with FEV1 50%  Depression  Atrial fibrillation  H/o \"two mini strokes\"  S/p fall with hip facture   · Right hip hemmiarthroplasty (5/23/18) by Dr. Maurice Camacho)  · S/p removed hip hardwarare due to pain (4/15/19)  COPD severe  CKD, stage 4, on HD  Pulmonary hypertension  Cardiac risk factors:  · Morbid obesity, Body mass index is 42.29 kg/m².   · DM2 insulin dependent  · JED on Trilogy  · HL  Urinary incontinence, severe  · no procedures due to anticoagulation  Endometrial cancer (2002)  HTN  HL  Full Code   Sepsis    CARDIAC IMAGING:  TTE 5/11/21 LVEF < 15%, LVIDd 5.96cm, TAPSE 0.99cm, RVIDd 4.14cm  TTE 4/19 shows improved LVIDd, 6.68 cm with RVIDd 5.14 cm and TAPSE 1.1 cm   TTE 4/28/21 LVIDd 7.37cm, RVIDd 5.27cm, TAPSE 1.44cm   Echo (9/24/19) LVEF 20-25%, AV opens 1:1, no AR  Echo (5/29/18) LVEF 10-%, ramps study done, report in epic  Echo (1/9/18) ramp study done, LVEDD 7.1cm  LHC (11/9/18) 2 vessel disease with 90% OM, 80% PDA, DSA to PDA branch  RHC 4/20 showed adequate Sal CI 3.0 but severely elevated RA pressure 24 mmHg  CXR negative 4/27/21    INTERVAL EVENTS:   Hgb 8  Creatinine down to 3.22 with HD  Febrile this am  VT in am- RRT called and moved to CVI  Amiodarone started   Confused this morning     HEMODYNAMICS:  RHC not done  CPEST not done  6MW not done    OTHER IMAGING:  CXR Results  (Last 48 hours)    None            HISTORY OF PRESENT ILLNESS:  I had the pleasure of seeing Triny Tolliver in [address] at [facility] [address] [place] [address] in [place]. Briefly, Diego Reddy is a 73 y.o. female with h/o LVAD implant with HM 2 and ESRD requiring HD (now off HD). She had some recent hospitalizations and additional complications and ultimately decided to discharge home with hospice support. Her UOP increased and repeat labs at home showed improved renal function, and she was discharged from Hospice services for not meeting criteria. She is still at home, living with her daughter, now with home health services in place instead of hospice. Routine lab surveillance revealed ROCIO on CKD, Cr > 7 and Hgb 6. She was asked to present to ER for further assessment as her goals of care were not entirely clear and had been discharged from hospice. REVIEW OF SYSTEMS:  Review of Systems   Unable to perform ROS: Acuity of condition        PHYSICAL EXAM:  Visit Vitals  /72 (BP 1 Location: Right lower arm, BP Patient Position: At rest)   Pulse (!) 115   Temp 98.9 °F (37.2 °C)   Resp 28   Ht 5' 7\" (1.702 m)   Wt 308 lb 10.3 oz (140 kg)   SpO2 91%   BMI 48.34 kg/m²     Physical Exam  Vitals and nursing note reviewed. Constitutional:       Appearance: She is obese. HENT:      Head: Normocephalic and atraumatic. Eyes:      Conjunctiva/sclera: Conjunctivae normal.      Pupils: Pupils are equal, round, and reactive to light. Cardiovascular:      Comments: LVAD Humm noted on auscultation  Pulmonary:      Effort: Pulmonary effort is normal. No respiratory distress. Breath sounds: Rales present. Abdominal:      General: Bowel sounds are normal. There is no distension. Palpations: Abdomen is soft. Tenderness: There is no abdominal tenderness. Musculoskeletal:         General: No swelling. Cervical back: Neck supple. Right lower leg: No edema. Left lower leg: No edema. Skin:     General: Skin is warm and dry. Capillary Refill: Capillary refill takes less than 2 seconds. Neurological:      General: No focal deficit present. Mental Status: She is alert. She is disoriented. PAST MEDICAL HISTORY:  Past Medical History:   Diagnosis Date    Asthma     Cancer ([facility]     breast    [facility]     endometrial    Congestive heart failure, unspecified     CRI (chronic renal insufficiency)     Depression     Diabetes ([facility]     Hypertension     Severe sepsis with acute organ dysfunction ([facility] 5/11/2021       PAST SURGICAL HISTORY:  Past Surgical History:   Procedure Laterality Date    HX HERNIA REPAIR      HX HYSTERECTOMY      HX MASTECTOMY      IR INSERT NON TUNL CVC OVER 5 YRS  4/26/2021    IR INSERT TUNL CVC W/O PORT OVER 5 YR  5/5/2021       FAMILY HISTORY:  No family history on file. SOCIAL HISTORY:  Social History     Socioeconomic History    Marital status:      Spouse name: Not on file    Number of children: Not on file    Years of education: Not on file    Highest education level: Not on file   Tobacco Use    Smoking status: Never Smoker    Smokeless tobacco: Never Used   Substance and Sexual Activity    Alcohol use: Not Currently     Social Determinants of Health     Financial Resource Strain:     Difficulty of Paying Living Expenses:    Food Insecurity:     Worried About Running Out of Food in the Last Year:     [address] in the Last Year:    Transportation Needs:     Lack of Transportation (Medical):      Lack of Transportation (Non-Medical):    Physical Activity:     Days of Exercise per Week:     Minutes of Exercise per Session:    Stress:     Feeling of Stress :    Social Connections:     Frequency of Communication with Friends and Family:     Frequency of Social Gatherings with Friends and Family:     Attends Sabianism Services:     Active Member of Clubs or Organizations:     Attends Club or Organization Meetings:     Marital Status:        LABORATORY RESULTS:  Labs Latest Ref Rng & Units 9/7/2021 9/6/2021 9/5/2021 9/4/2021 9/3/2021 9/3/2021 9/2/2021   WBC 3.6 - 11.0 K/uL 9.0 5.4 3. 0(L) 2. 7(L) - 1. 9(L) 3. 1(L)   RBC 3.80 - 5.20 M/uL 2.64(L) 2.28(L) 2.43(L) 2.13(L) - 2.11(L) 2.66(L)   Hemoglobin 11.5 - 16.0 g/dL 8. 0(L) 6. 8(L) 7. 4(L) 6. 5(L) 6. 4(L) 6. 3(L) 8. 1(L)   Hematocrit 35.0 - 47.0 % 24. 5(L) 21. 4(L) 23. 0(L) 20. 2(L) 20. 5(L) 20. 1(L) 25. 0(L)   MCV 80.0 - 99.0 FL 92.8 93.9 94.7 94.8 - 95.3 94.0   Platelets 065 - 390 K/uL 118(L) 114(L) 123(L) 117(L) - 109(L) 140(L)   Lymphocytes 12 - 49 % - - 13 - - - 11(L)   Monocytes 5 - 13 % - - 7 - - - 4(L)   Eosinophils 0 - 7 % - - 0 - - - 0   Basophils 0 - 1 % - - 1 - - - 0   Albumin 3.5 - 5.0 g/dL 1.6(L) 1.5(L) 1.6(L) 1.5(L) - - 1. 9(L)   Calcium 8.5 - 10.1 MG/DL 7. 5(L) 7. 2(L) 6. 8(L) 6. 7(L) - 6. 4(LL) 7. 3(L)   Glucose 65 - 100 mg/dL 81 73 89 86 - 77 91   BUN 6 - 20 MG/DL 25(H) 52(H) 98(H) 103(H) - 97(H) 99(H)   Creatinine 0.55 - 1.02 MG/DL 3.22(H) 4.98(H) 8.00(H) 7.91(H) - 7.12(H) 7.38(H)   Sodium 136 - 145 mmol/L 137 135(L) 137 138 - 136 133(L)   Potassium 3.5 - 5.1 mmol/L 3.7 4.1 4.4 4.5 - 4.2 5.0   TSH 0.36 - 3.74 uIU/mL - - - 5.24(H) - - -   LDH 81 - 246 U/L 354(H) 299(H) 319(H) 279(H) - - -   Some recent data might be hidden       ALLERGY:  Allergies   Allergen Reactions    Atarax [Hydroxyzine Hcl] Other (comments)     Small doses tolerated. Full dose 10mg causes oversedation.      Benzodiazepines Other (comments)     Respiratory issues        CURRENT MEDICATIONS:    Current Facility-Administered Medications:     potassium chloride 20 mEq in 50 ml IVPB, 20 mEq, IntraVENous, ONCE, Polliard, Vernia Krabbe T, NP, Last Rate: 25 mL/hr at 09/07/21 1029, 20 mEq at 09/07/21 1029    lidocaine (PF) (XYLOCAINE) 100 mg/5 mL (2 %) injection syringe, , , ,     [START ON 9/8/2021] epoetin amalia-epbx (RETACRIT) injection 20,000 Units, 20,000 Units, SubCUTAneous, Q MON, WED & FRI, Jaspreet Mendoza MD    amiodarone (CORDARONE) 375 mg/250 mL D5W infusion, 0.5-1 mg/min, IntraVENous, TITRATE, Cabrera Shah MD, Last Rate: 40 mL/hr at 09/07/21 1038, 1 mg/min at 09/07/21 1038    fluconazole in 0.9% NaCl 100 mg IVPB, 100 mg, IntraVENous, DAILY, Triny Limon NP    levETIRAcetam (KEPPRA) 250 mg in 0.9% sodium chloride 100 mL IVPB, 250 mg, IntraVENous, Q12H, Triny Limon NP    0.9% sodium chloride infusion 250 mL, 250 mL, IntraVENous, PRN, Camilo June MD    [Held by provider] guaiFENesin ER (MUCINEX) tablet 600 mg, 600 mg, Oral, Q12H, Camilo June MD, 600 mg at 09/06/21 1336    HYDROcodone-acetaminophen (NORCO) 5-325 mg per tablet 1 Tablet, 1 Tablet, Oral, Q6H PRN, Camilo June MD, 1 Tablet at 09/06/21 1335    HYDROcodone-acetaminophen (NORCO) 5-325 mg per tablet 2 Tablet, 2 Tablet, Oral, Q6H PRN, Camilo June MD    heparin (porcine) 1,000 unit/mL injection 1,900 Units, 1,900 Units, InterCATHeter, DIALYSIS PRN, 1,900 Units at 09/06/21 1813 **AND** heparin (porcine) 1,000 unit/mL injection 1,900 Units, 1,900 Units, InterCATHeter, DIALYSIS PRN, Mikayla Miramontes MD, 1,900 Units at 09/06/21 1812    hydrALAZINE (APRESOLINE) 20 mg/mL injection 10 mg, 10 mg, IntraVENous, Q4H PRN, Aydin Walter NP, 10 mg at 09/07/21 0402    [Held by provider] fluconazole (DIFLUCAN) tablet 100 mg, 100 mg, Oral, DAILY, Camilo June MD, 100 mg at 09/06/21 0907    sodium chloride (NS) flush 5-40 mL, 5-40 mL, IntraVENous, Q8H, Camilo June MD, 10 mL at 09/07/21 0656    sodium chloride (NS) flush 5-40 mL, 5-40 mL, IntraVENous, PRN, Camilo June MD    acetaminophen (TYLENOL) tablet 650 mg, 650 mg, Oral, Q6H PRN, 650 mg at 09/05/21 1116 **OR** acetaminophen (TYLENOL) suppository 650 mg, 650 mg, Rectal, Q6H PRN, Arti Murguia MD    ondansetron (ZOFRAN ODT) tablet 4 mg, 4 mg, Oral, Q8H PRN **OR** ondansetron (ZOFRAN) injection 4 mg, 4 mg, IntraVENous, Q6H PRN, Camilo June MD, 4 mg at 09/06/21 0513    senna (SENOKOT) tablet 8.6 mg, 1 Tablet, Oral, DAILY PRN, Eric Wahl MD  [facility] by provider] levETIRAcetam (KEPPRA) tablet 250 mg, 250 mg, Oral, BID, Eric Wahl MD, 250 mg at 09/06/21 1800    PARoxetine (PAXIL) tablet 20 mg, 20 mg, Oral, DAILY, Eric Wahl MD, 20 mg at 09/06/21 0907    Warfarin - pharamcy to dose, , Other, Rx Dosing/Monitoring, Eric Wahl MD    [Held by provider] cephALEXin (KEFLEX) capsule 500 mg, 500 mg, Oral, BID, Rosanna Jones MD, 500 mg at 09/06/21 1109    hydrALAZINE (APRESOLINE) tablet 100 mg, 100 mg, Oral, TID, Rosanna Jones MD, 100 mg at 09/05/21 2126    tafamidis cap 61 mg (Patient Supplied), 61 mg, Oral, DAILY, Rosanna Jones MD, 61 mg at 09/06/21 [address], NP  [address] [place] [address], [address]  Phone: [phone] TEAM:  Patient Care Team:  Alexis Delgadillo NP as PCP - General (Nurse Practitioner)  Leighton Shipley MD (Cardiology)  Jennifer Kelsey MD as Physician (Cardiology)  Fantasma Rao MD (Cardiothoracic Surgery)  Orlando Rodriguez MD (Cardiology)     [facility] ATTENDING ADDENDUM    Patient was seen and examined in person. Data and notes were reviewed. I have discussed and agree with the plan as noted in the NP note above without further additions.     Nomi Luna MD PhD  Angie Souza

## 2021-09-07 NOTE — PROGRESS NOTES
Transitions of Care:    RUR - 45% high    Dispo: Referral made to [facility] - evaluation pending    Transport: ALS/BLS transport to home    Reason for Admission:   Fluid overload - LVAD Heartmate 2                 RUR Score:     45%      PCP: First and Last name:  Elisa Liu NP     Name of Practice:   Are you a current patient: Yes/No:   Approximate date of last visit:    Can you do a virtual visit with your PCP:              Resources/supports as identified by patient/family:   Daughter provides needed support at home                Top Challenges facing patient (as identified by patient/family and CM): Finances/Medication cost?      Medicare/Blue Cross Federal              Transportation? ALS/BLS              Support system or lack thereof? Daughter                     Living arrangements? Lives in own home in [place]           Self-care/ADLs/Cognition? Patient was receiving [facility] prior to admit - no longer has OP HD that had previously been set up in [address] near daughter home- had [facility] but was disenrolled as patients condition had improved           Current Advanced Directive/Advance Care Plan:  DNR      Healthcare Decision Maker:   Click here to complete [address] including selection of the Healthcare Decision Maker Relationship (ie \"Primary\")      Primary Decision Maker: Parvez Olmos - Daughter - [phone]    Payor Source Payor: [place] MEDICARE / Plan: Ruth Leon / Product Type: Medicare /                             Plan for utilizing home health:    TBD- hospice eval pending                 Transition of Care Plan:                  Patient is well known to  and [facility] staff. Was previously discharged to her home in [address] [place] [address] with support from her daughter. Patient receiving [facility] prior to admit.   CM will await evaluation from [facility] for further GO and plans at this time. ENMA Solo    Care Management Interventions  PCP Verified by CM: Yes (seen regulary by [facility])  Mode of Transport at Discharge:  Other (see comment)  Transition of Care Consult (CM Consult): Diego: Yes  Discharge Durable Medical Equipment: No  Physical Therapy Consult: No  Occupational Therapy Consult: No  Speech Therapy Consult: No  Current Support Network: Own Home (Daughter provides needed support at home)  Confirm Follow Up Transport: Other (see comment) (BLS/ALS)  Discharge Location  Discharge Placement: Home with hospice

## 2021-09-07 NOTE — PROGRESS NOTES
Pharmacist Note  Warfarin Dosing  Consult provided for this 73 y. o.female to manage warfarin for LVAD    INR Goal:1.8-2.5 (2/2 GIB)    Home regimen/ tablet size: 2mg qod alternating with 3 mg every other day daily    Drugs that may increase INR: None  Drugs that may decrease INR: None  Other current anticoagulants/ drugs that may increase bleeding risk: None  Risk factors: Age > 65, dialysis  Daily INR ordered: YES    Recent Labs     09/07/21  0051 09/06/21  0457 09/05/21  0541   HGB 8.0* 6.8* 7.4*   INR 3.8* 3.8* 3.6*     Date               INR                  Dose  9/2   2.4     3 mg   9/3                  2.4                   3 mg   9/4                  3.3                   HOLD  9/5                  3.6                   HOLD  9/6      3.8      HOLD  9/7      3.8      HOLD                                                                               Assessment/ Plan: Will HOLD warfarin for INR = 3.8  Pharmacy will continue to monitor daily and adjust therapy as indicated.

## 2021-09-07 NOTE — PROGRESS NOTES
TIMOTHY spoke with Castro Kaiser with [address] out for delivery to patient's home, Dane Smith contacted  notified him patient was in hospital, once she is discharged Dane Smith will contact for delivery.

## 2021-09-07 NOTE — PROGRESS NOTES
+                                                                                                                                                                                     Critical Care Documentation    Name: Carolina Gonzalez  YOB: 1950  MRN: [id number]  Admission Date: 9/2/2021  1:12 PM    Date of service: 9/7/2021    Active Diagnoses:    Hospital Problems  Date Reviewed: 7/27/2021        Codes Class Noted POA    CHF exacerbation ([facility] ICD-10-CM: I50.9  ICD-9-CM: 428.0  9/2/2021 Unknown        * (Principal) ROCIO (acute kidney injury) ([facility] (Chronic) ICD-10-CM: N17.9  ICD-9-CM: 584.9  11/9/2020 Yes              Chief Complaint: Wide complex tachycardia      Clinical Presentation: Alton Vicente a 73 y.o. female who presents acute renal failure referred by heart failure center for fluid overload, and for abnormal kidney function.  Patient does have LVAD Heartmate 2., went into V tech this morning      Physical Exam:                                                 Constitutional:  on non re breather    ENT:  MM dry   Resp:  CTA bilaterally. CV:  LVAD hum    GI:  Soft, non distended, non tender. bs+    Musculoskeletal:  Extremity swollen edematous lower extremity chronic skin changes    Neurologic:  Moves all extremities. Data Reviewed: All diagnostic labs and studies have been reviewed. EKG done     Assessment and Plan:    1. V tech with wide complex tachy in a LVAD pt    EKG done and reviewed with Dr. Emory Merlin - seems mary alice ramirez  Suggested amiodarone  bolus  D/w ICU attending and AHFT   transferring to ICU for further MGMT  Given lidocaine instead after d/w ICU attending   Pt transferred to CVICU will sign off         Medications Administered:   Sedation: [ ] yes [x ] no   Anxiolytics: [ ] yes [ x] no   Antiarrhythmics: [x ] yes [ ] no   Antihypertensives: [ ] yes [x ] no   Pressors: [ ] yes [ x] no   IVF's: [ ] yes [x ] no       Critical Care Attestation:   This patient is unstable and critically ill. Due to a high probability of clinically significant, life threatening deterioration, the patient required my highest level of preparedness to intervene emergently and I personally spent this critical care time directly and personally managing the patient. This critical care time included obtaining a history; examining the patient; pulse oximetry; ordering and review of studies; arranging urgent treatment with development of a management plan; evaluation of patient's response to treatment; frequent reassessment; and, discussions with other providers and/or family. This critical care time was performed to assess and manage the high probability of imminent, life-threatening deterioration that could result in multi-organ failure and death. It was exclusive of separately billable procedures, treating other patients, and teaching time. Time Spent:     I personally spent 35 minutes in providing critical care time.     Jordyn Camejo MD  9/7/2021  9:05 AM    Addendum: pt transferred back to floor after family decided to go with comfort measures only, Hospice consult , pall care on board

## 2021-09-08 ENCOUNTER — DOCUMENTATION ONLY (OUTPATIENT)
Dept: CARDIOLOGY CLINIC | Age: 71
End: 2021-09-08

## 2021-09-08 LAB
ATRIAL RATE: 163 BPM
BACTERIA SPEC CULT: NORMAL
CALCULATED R AXIS, ECG10: 175 DEGREES
CALCULATED T AXIS, ECG11: 21 DEGREES
DIAGNOSIS, 93000: NORMAL
Q-T INTERVAL, ECG07: 284 MS
QRS DURATION, ECG06: 154 MS
QTC CALCULATION (BEZET), ECG08: 470 MS
SARS-COV-2, XPLCVT: NOT DETECTED
SERVICE CMNT-IMP: NORMAL
SOURCE, COVRS: NORMAL
VENTRICULAR RATE, ECG03: 165 BPM

## 2021-09-08 NOTE — PROGRESS NOTES
0730: Bedside shift change report given to Mirta Sharp (oncoming nurse) by Michael Jenkins (offgoing nurse). Report included the following information SBAR, Kardex, MAR, Recent Results and Cardiac Rhythm V-Paced. 0820: RN noted pt to be in sustained VTACH with HR in  170s. Map 65. O2 90% with trilogy. Pt diaphoretic with labored respirations. Central line actively bleeding. Hand held pressure applied at site. Pacer pads placed. [facility] paged. 1770:  Rapid response called. Map 60. During RR, lidocaine administered x2. HR sustained 130s. TRANSFER - OUT REPORT:    Verbal report given to Kelly (name) on Hellen Olivo  being transferred to CVICU (unit) for urgent transfer       Report consisted of patients Situation, Background, Assessment and   Recommendations(SBAR). Information from the following report(s) SBAR, Kardex, [facility], Recent Results and Cardiac Rhythm [facility] was reviewed with the receiving nurse. Lines:   Quad Lumen 09/04/21 Left (Active)   Central Line Being Utilized Yes 09/07/21 1000   Criteria for Appropriate Use Limited/no vessel suitable for conventional peripheral access 09/07/21 1000   Site Assessment Bleeding 09/06/21 2011   Infiltration Assessment 0 09/07/21 1000   Affected Extremity/Extremities Color distal to insertion site pink (or appropriate for race); Pulses palpable 09/07/21 1000   Date of Last Dressing Change 09/06/21 09/07/21 1000   Dressing Status Dry; Intact 09/07/21 1000   Dressing Type Disk with Chlorhexadine gluconate (CHG); Transparent 09/07/21 1000   Action Taken Open ports on tubing capped 09/07/21 1000   Proximal Hub Color/Line Status White;Patent; Flushed;Capped 09/07/21 1000   Positive Blood Return (Medial Site) Yes 09/07/21 1000   Medial 1 Hub Color/Line Status Gray;Patent; Flushed;Capped 09/07/21 1000   Positive Blood Return (Lateral Site) Yes 09/07/21 1000   Medial 2 Hub Color/Line Status Blue;Patent; Flushed;Capped 09/07/21 1000   Positive Blood Return (Site #3) Yes 09/07/21 1000   Distal Hub Color/Line Status Brown;Patent; Flushed;Capped 09/07/21 1000   Positive Blood Return (Site #4) Yes 09/07/21 1000   Alcohol Cap Used Yes 09/07/21 1000       Venous Access Device Hemodialysis catheter Upper chest (subclavicular area, right (Active)   Central Line Being Utilized No 09/07/21 1000   Criteria for Appropriate Use Dialysis/apheresis 09/07/21 1000   Site Assessment Intact 09/06/21 2011   Date of Last Dressing Change 09/05/21 09/07/21 1000   Dressing Status Dry; Intact 09/07/21 1000   Dressing Type Disk with Chlorhexadine gluconate (CHG); Transparent 09/07/21 1000   Action Taken Open ports on tubing capped 09/07/21 1000        Opportunity for questions and clarification was provided.       Patient transported with:   Rn,  Monitor

## 2021-09-08 NOTE — PROGRESS NOTES
TIMOTHY received a phone call from patient's daughter, Hannah Cabrera requesting information on having patient's home hospital bed removed from home and cancellation of lightweight wheelchair from [facility]. TIMOTHY has left a message for [facility][phone][facility] to return call and will e-mail Hannah Cabrera information on scheduling  of hospital bed 91/9/21 or 9/11/21  1 [phone] option 3.    TIMOTHY recevied a message from Hannah Cabrera, [address] [place] [address] attempted to deliver the wheelchair, however Hannah Cabrera refused delivery. Tyronefazal Cabrera also stated she received TIMOTHY e-mail and she will reach out to [facility] to schedule time for hospital bed . No other needs at this time.

## 2021-09-08 NOTE — PROGRESS NOTES
2000: Received report from Fransisca Miner, [facility], [facility]. Pt unresponsive to sternal rub or painful stimuli, doppler MAP reading 38. 02 sats 86% while on home trilegy device. Heart rhythm remains VTach with -140's.     2130: RN calling house supervisorMeche to discuss possible bed placement, no step down beds available. Permission granted by [employer], RN supervisor to have pt's daughter come to bedside. Will provide daughter, Magaly Vides, with n95 mask upon arrival to unit. 2150: Palomo Freeman NP on phone with RN discussing next steps and updating on current vital signs and pt status. Directions given to turn LVAD off if daughter decides to do so. 2159: VT stopped, pt pacing in the 70-80's. Doppler MAP: 20.    2200: Magaly Vides, pt's daughter, on telephone. RN giving updates on pt status and offering her emotional support. Offer to have Magaly Vides come to bedside, she is at [facility] and will come as soon as possible. 2215Lanking Sharma, pt's daughter at bedside. n95 mask provided prior to entry to unit. 2216: HR dropping, MAP unreadable. 2250:  RN discussing plan of care with Magaly Vides, pt's daughter. Plan to LVAD disconnected per Daughters request. Trilegy mask removed. 2255: Asystole on monitor. Palomo Freeman NP and Intensivist paged. Intensivist at code blue currently, will come to bedside as soon as possible. 0000: Hospitalist paged. Dr. Aimee Pickard. Intensivist unable to come to bedside at this time. Pt's daughter remains at bedside. 0008: [facility] on paged. 0022: Olivier Hayes NP at bedside for pronunciation of death. Pt's daughter remains at bedside, opportunities for questions and concerns provided at this time. Melinda, pt's daughter leaving bedside. She took with her pt's LVAD equipment including 2 batteries, controller, and backup controller and trilegy machine including tubing and carrying case, and pt's green blanket. No other valuables at bedside.      0023: Release from [facility] confirmed, release from [facility] confirmed. Chaplain jaime. 0025Cara Mcallister, RN Supervisor made aware of death.

## 2021-09-08 NOTE — DISCHARGE SUMMARY
[address] [facility] Adult  Hospitalist Group     Death Discharge Summary   PATIENT ID: Puneet Bailey  MRN: [id number]   YOB: 1950    DATE OF ADMISSION: 9/2/2021  1:12 PM    PRIMARY CARE PROVIDER: Deja Jones NP   ATTENDING PHYSICIAN: Dr. Angie Beckett:   LEO Spaulding TO CARDIOLOGY    PROCEDURES/SURGERIES:   * No surgery found *    REASON FOR ADMISSION: ROCIO (acute kidney injury) [facility])     HOSPITAL PROBLEM LIST:  Patient Active Problem List   Diagnosis Code    Obesity, morbid ([address] E66.01    Acute on chronic systolic CHF (congestive heart failure) ([facility] I50.23    NICM (nonischemic cardiomyopathy) ([facility] I42.8    Coronary artery disease involving native coronary artery of native heart without angina pectoris I25.10    JED on CPAP G47.33, Z99.89    Chronic venous insufficiency I87.2    Ulcer of right foot, limited to breakdown of skin ([facility] L97.521    NSVT (nonsustained ventricular tachycardia) ([facility]) I47.2    Anemia D64.9    ROCIO (acute kidney injury) ([facility] N17.9    Coagulopathy ([facility] D68.9    Open wound of left lower leg S81.802A    Incontinence in female R32    LVAD (left ventricular assist device) present [facility]) [address] discharge follow-up Z09    ICD (implantable cardioverter-defibrillator) battery depletion Z45.02    HAIRSTON (dyspnea on exertion) R06.00    Dyspnea R06.00    Acute encephalopathy G93.40    Severe sepsis with acute organ dysfunction (HCC) A41.9, R65.20    CHF exacerbation ([address] I50.9       DATE AND TIME OF DEATH: 09/07/2021 6829     CODE STATUS AT DISCHARGE:   Full Code   x DNR    Partial    Comfort Care     DISCHARGE DIAGNOSES: Heart failure    Brief HPI and Hospital Course:      Puneet Bailey is a 22-year-old female originally admitted 9/2/2021 from the heart failure clinic for acute renal failure, fluid overload and abnormal kidney function. Her PMH was significant for CAD, obesity, heart failure s/p LVAD, COPD, CKD 3, pulmonary hypertension, depression, hypertension and hyperlipidemia. She was originally admitted to the floor, but was transferred to the ICU after an episode of stable ventricular tachycardia. She was worked up for sepsis and endocarditis. She was restarted on hemodialysis on 2021 and her tachyarrhythmia continued. Cardiology recommended continuing amiodarone. After consulting with patient's family, decision was made to return to hospice care on 2021. LVAD was turned off at approximately 10 PM and patient  shortly thereafter       On exam, no heart sounds or breath sounds were noted after 1 minute of auscultation. Pupils were fixed and dilated without pupillary light reflex. Patient was pronounced dead on 2021 2255    Cause of Death:  Immediate: Heart failure    Events related to death:  Was code called: [address]   notified: YES  Family notified: Gladys Miller  Autopsy requested: NO  Death certificate completed: NO  Code Status Prior to Death: DNR     PHYSICAL EXAMINATION AT DISCHARGE:  GEN: No response to verbal or tactile stimuli  HEENT: Pupils fixed, dilated  CV: No cardiac activity or heart sounds appreciated. No carotid pulse. PULM: No respiratory effort or spontaneous respirations. Ext: No peripheral pulses.       Signed:   James Hernandez NP  Date of Service:  2021  2:54 AM

## 2021-09-08 NOTE — PROGRESS NOTES
requested, inpatient death CVICU room 9396. Spoke to patient's nurse. Daughter was present, but stated she was not in need of pastoral care as she has already been in touch with her family . Daughter left shortly after her mother's passing. Please contact spiritual care for further referral or consult. Rev.  Mars Apodaca MDiv, [facility], [facility]   paging service: [phone])

## 2021-09-08 NOTE — HOSPICE
[address] Bereavement/Condolence Call: This LCSW called pts daughter Sara Causey to offer condolences and support. LCSW and Sara Causey discussed mother going home and being able spend time with her son. LCSW and daughter discussed Ivis Celestin information for ongoing support. She is open to a f/u phone call from [facility]. Pt was on hospice services and revoked 7/19/2021 for no longer meeting hospice criteria. Kvng Barney RN sent email to [facility] to see if they can provide support.         [facility]    [phone]

## 2021-09-08 NOTE — HOSPICE
Hospice Liaison Note:  2021  10:00am    Chart reviewed for updates in plan of care. Noted patient  last night, not under Hospice Care. Plan: [facility] will continue to provide Bereavement support to patient's daughter, Love Patella, and review patient/family in IDG. Please call Hospice team to offer support for patient, family or staff.     Thank you for your coordination with the hospice plan of care      Charisma Siddiqi RN, [place] [address] Nurse Liaison  [phone] [place]  [phone] Office

## 2021-09-08 NOTE — PROGRESS NOTES
Death Pronouncement Note      Events prior to patients death:    Called to bedside at 12:54 AM for unresponsive and pulseless patient. On exam, no heart sounds or breath sounds were noted after 1 minute of auscultation. Pupils were fixed and dilated without pupillary light reflex.      Patient was pronounced dead on 09/07/2021 at 2255      Cause:     Immediate: Heart failure    Underlying cause: Acute metabolic encephalopathy, CAD, nonischemic cardiomyopathy, acute on chronic heart failure, LVAD dependence, end-stage renal disease    Hospital Problems  Date Reviewed: 7/27/2021        Codes Class Noted POA    CHF exacerbation ([facility] ICD-10-CM: I50.9  ICD-9-CM: 428.0  9/2/2021 Unknown        * (Principal) ROCIO (acute kidney injury) ([facility] (Chronic) ICD-10-CM: N17.9  ICD-9-CM: 584.9  11/9/2020 Yes              Physician Pronouncing Death: Reji Acosta NP    Attending Physician of Record:   Janella Bloch, MD     Events related to death:    Was code called: NO   notified: YES  Family notified: Belinda Tenorio  Autopsy requested: NO  Death certificate completed: NO  Code Status Prior to Death: DNR     Discharge summary and death certificate will be completed by: Janella Bloch, MD    Date of Service:  9/8/2021

## 2021-09-11 LAB
BACTERIA SPEC CULT: ABNORMAL
BACTERIA SPEC CULT: ABNORMAL
CC UR VC: ABNORMAL
SERVICE CMNT-IMP: ABNORMAL

## 2021-09-20 NOTE — PROGRESS NOTES
Physician Progress Note      PATIENT:               Karl Vicente  CSN #:                  [id number]  :                       1950  ADMIT DATE:       2021 1:12 PM  [address] DATE:        2021 10:55 PM  RESPONDING  PROVIDER #:        Gunnar Perales MD          QUERY TEXT:    Pt admitted with Acute Renal Failure. Pt noted to have Sepsis per cardiology PN beginning 9/3. Additional documentation of ATN and Acute Metabolic Encephalopathy. If possible, please document in the progress notes and discharge summary if you are evaluating and /or treating any of the following: The medical record reflects the following:    Risk Factors: 71yo female admit Acute Renal Failure, taking Keflex PO prior to arrival, and on admit with low Wbc levels & left shift  Clinical Indicators:  -WBC 3.1 - 1.9 - 2.7 - 3.0 - 5.4 - 9.0  -Neutrophils: 83 - 78  -UA: pink, cloudy, large blood, small leuks, wbc 0-4, Yeast  -Procalcitonin 1.0 with rise to 22.72 on   -Lactic Acid 1.9 on   -Central Line in place since 2021  -LVAD in place  -9/3 Hospitalist: UA showing Yeast Infection  -9/3 Renal: Fungal UTI  -9/3 Cardiology: ROCIO on CKD- suspect due to prerenal azotemia vs Septic ATN;  Impression-Sepsis  -Intermittent episodes of VTach  - at 2200 by RN: too sedated to take oral meds  - Intensivist: Febrile this afternoon, concern over chronic sternal wound infection vs line infection; Acute Metabolic Encephalopathy  -DC Summary: she was worked up for sepsis & endocarditis  Treatment: continue home Keflex dose, NS at 100-50cc/hr, \"UA & pCXR to r/o infection pending at time of admit\", order for Diflucan 9/3, change PO to IV meds, transfer to ICU for management of arrhythmias, empiric antibiotics, & ultimate decision for comfort measures only    Thank you,  Lori Garnett, HEMAN, RN, CCDS, CDI Supervisor  Office [phone] or [phone]  Options provided:  -- Sepsis, present on admission with Acute Organ Dysfunction  -- Sepsis, present on admission without Acute Organ Dysfunction  -- Sepsis, not present on admission with Acute Organ Dysfunction  -- Sepsis, not present on admission without Acute Organ Dysfunction  -- Other - I will add my own diagnosis  -- Disagree - Not applicable / Not valid  -- Disagree - Clinically unable to determine / Unknown  -- Refer to Clinical Documentation Reviewer    PROVIDER RESPONSE TEXT:    Provider is clinically unable to determine a response to this query. Query created by: Bhakti Baxter on 9/13/2021 4:54 PM      QUERY TEXT:    Pt admitted with Acute Renal Failure and documentation of suspected causes, including ATN. If possible, please document in the progress notes and discharge summary if you are evaluating and/or treating any of the following: The medical record reflects the following:    Risk Factors: 73yo female admitted with Acute Renal Failure of unclear etiology  Clinical Indicators:  -BUN 99 - 97 - 103 - 98 - 52 - 25  -GFR 5 - 6 - 5 - 5 - 9 - 14  -CR 7.38 - 7.12 - 7.91 - 8.00 - 4.98 - 3.22  -Acute Renal Failure on CKD stage 4 (serum Cr baseline around 2)  -Cardiology 9/3: ROCIO on CKD, suspect due to prerenal azotemia vs Septic ATN.   Impression- Sepsis  -Renal 9/4: appears to have ATN  -Hospitalist 9/4: Acute Renal Failure- unclear etiology, doesn't look like cardiorenal vs ATN  -Advanced HF NP 9/4: admit for ROCIO on CKD, suspect due to prerenal azotemia vs septic ATN  Treatment: Renal consult, Renal Ultrasound, Bumex held, NS at 100-50cc/hr, monitoring BUN/Cr/GFR levels    Thank you,  HEMA PhillipsN, RN, CCDS, CDI Supervisor  Office [phone] or [phone]  Options provided:  -- Acute kidney failure  -- Acute kidney failure with acute tubular necrosis  -- Other - I will add my own diagnosis  -- Disagree - Not applicable / Not valid  -- Disagree - Clinically unable to determine / Unknown  -- Refer to Clinical Documentation Reviewer    PROVIDER RESPONSE TEXT:    Patient with CKD and ESRD on HD    Query created by: Marina Burrell on 9/13/2021 5:06 PM      QUERY TEXT:    Pt admitted with Acute Renal Failure and notation of UA & pCXR to r/o infection pending at time of admit. If possible, please document in the progress notes and discharge summary if you are evaluating and/or treating any of the following: The medical record reflects the following:    Risk Factors: 73yo female  Clinical Indicators:  -9/2 UA: pink, cloudy, large blood, small leukocytes, wbc 0-4, Yeast  -9/3 Hospitalist: UA showing Yeast Infection  -9/3 Renal: Fungal UTI  -9/3 Cardiology: Sepsis  -9/7 Urine Culture showing Proteus Vulgaris & Pseudomonas Aeruginosa  Treatment: continuance of home PO Keflex before changing to IV on 9/7, UA performed 9/2, Fletcher, PO Diflucan started 9/3, Urine Culture performed 9/7, start IV Vancomycin & Cefepime 9/7, NS at 100-50cc/hr    Thank you,  HEMA HuttonN, RN, CCDS, CDI Supervisor  Office [phone] or [phone]  Options provided:  -- Urinary Tract Infection (UTI) due to Candida  -- Urinary Tract Infection (UTI) due to Candida, Proteus Vulgaris, & Pseudomonas Aeruginosa  -- Urinary Tract Infection (UTI) due to Proteus Vulgaris & Pseudomonas Aeruginosa  -- Other - I will add my own diagnosis  -- Disagree - Not applicable / Not valid  -- Disagree - Clinically unable to determine / Unknown  -- Refer to Clinical Documentation Reviewer    PROVIDER RESPONSE TEXT:    This patient had a UTI due to Proteus Vulgaris & Pseudomonas Aeruginosa. Query created by:  Marina Burrell on 9/13/2021 5:29 PM      Electronically signed by:  Louis Isabel MD 9/20/2021 8:30 AM

## 2022-01-06 NOTE — TELEPHONE ENCOUNTER
Casandra Velazquez NP  You 11 minutes ago (11:54 AM)       She can take 100mg hydralazine TID.  Resume bumex 1mg daily- take extra 1mg for weight gain 2-3lbs overnight or 5lbs in 7days    Message text      Contacted patient's daughter Ludy Catherine. Informed her of all recommendations. Updated prescriptions sent to pharmacy. Melinda verbalized understanding and had no further questions. Barbie Gil RN. Patient seems to be doing ok at this time but is due for yearly scan.  Will order this now and discussed this today.  Will call with result and have her back with me yearly.

## 2022-03-10 NOTE — PROGRESS NOTES
[facility] Heart & Vascular Lab - [facility]    This is a pre read worksheet - prior to official physician interpretation    Avelino Claros  4/20/1929  Date of study: 3/10/22    Indication for study:  Carotid artery stenosis  Study : Bilateral Carotid Artery Duplex Examination    Duplex examination of the RIGHT carotid artery system identifies atherosclerotic plaque. The peak systolic velocity in internal carotid artery was 274 centimeters / second. The maximum end diastolic velocity was 54 centimeters / second. The ICA/CCA ratio is 2.6. The right vertebral artery has antegrade flow. Duplex examination of the LEFT carotid artery system identifies atherosclerotic plaque. The peak systolic velocity in internal carotid artery was 127 centimeters / second. The maximum end diastolic velocity was 29 centimeters / second. The ICA/CCA ratio is 1.0. The left vertebral artery has antegrade flow.     Limited d/t bilateral large, dense plaques  RIGHT 70%    psv  LEFT 50%        LAST STUDY  9/3/2021 @ [facility] Spiritual Care Assessment/Progress Note [address] 
 
 
NAME: Gena King      MRN: [id number] AGE: 78 y.o. SEX: female Yarsanism Affiliation: Bahai  
Language: English  
 
11/16/2020 Spiritual Assessment begun in [facility] 4 CV SERVICES UNIT through conversation with: 
  
    [x]Patient        [x] Family    [] Friend(s) Reason for Consult: Initial/Spiritual assessment, patient floor Spiritual beliefs: (Please include comment if needed) 
   [] Identifies with a mayela tradition:     
   [] Supported by a mayela community:        
   [] Claims no spiritual orientation:       
   [] Seeking spiritual identity:            
   [x] Adheres to an individual form of spirituality:       
   [] Not able to assess:                   
 
    
Identified resources for coping:  
   [x] Prayer                           
   [] Music                  [] Guided Imagery [x] Family/friends                 [] Pet visits [] Devotional reading                         [] Unknown 
   [] Other:                                          
 
 
Interventions offered during this visit: (See comments for more details) Patient Interventions: Affirmation of emotions/emotional suffering, Catharsis/review of pertinent events in supportive environment, Initial/Spiritual assessment, patient floor, Iconic (affirming the presence of God/Higher Power), Normalization of emotional/spiritual concerns, Prayer (assurance of), Prayer (actual) Family/Friend(s): Initial Assessment, Prayer (actual), Prayer (assurance of) Plan of Care: 
 
 [x] Support spiritual and/or cultural needs  
 [] Support AMD and/or advance care planning process    
 [] Support grieving process 
 [] Coordinate Rites and/or Rituals  
 [] Coordination with community clergy [] No spiritual needs identified at this time 
 [] Detailed Plan of Care below (See Comments)  [] Make referral to Music Therapy 
[] Make referral to Pet Therapy [] Make referral to Addiction services 
[] Make referral to [facility] 
[] Make referral to Spiritual Care Partner 
[] No future visits requested       
[x] Follow up visits as needed Comments: Visited Ms Caroline Atkinson in room [phone] for initial spiritual assessment. Ms Caroline Atkinson was sitting up in a chair and the wound care nurse was attending to her. Patient's daughter was also present; patient and daughter appeared in pretty good spirits. Provided pastoral presence and active listening as Ms Caroline Atkinson shared about her medical issues. Said that she felt things were going okay. She denied having any needs other than to be kept in prayer. She said that she was not currently associated with any particular Yazidi/mayela community. Had prayer on behalf of patient and family and assured them of ongoing  availability for support. : Rev. Deejay Fagan. Chan Chen; [facility], to contact [phone] [address] call: [phone]

## 2022-04-19 NOTE — PROGRESS NOTES
[facility]   [address] [address], [facility], [facility]  Phone: [phone]   [phone]       Nephrology Progress Note  Isidra Carrera     1950     [id number]  Date of Admission : 9/2/2021 09/07/21    CC: Follow up for ROCIO       Assessment and Plan   ROCIO on CKD:  - appears to have ATN this time . UA : hematuria and proteinuria   - Restarted on dialysis on 9/5/2021 using R IJ permacath  - s/p HD x2   - No HD today    CKD IV:  - baseline Cr around 2 at baseline    Tachyarrythmia:  - suspected A flutter w/ 2:1 block per EP   - per cardiology      Chronic systolic CHF, stage C NYHA class IV  Combined ischemic and nonischemic cardiomyopathy  S/p HM 2 LVAD implant 4/15/2017 by Dr. 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      Chronic anemia :   ATTR amyloidosis  -On HEIKE     Hx of Sz d/o:  - on keppra     Chronic A. Fib.     History of endometrial Ca  Obesity  COPD    Discussed with patient and daughter, RN     Interval History:  Events noted. Tolerated HD but reportedly got benadryl during HD and several Hours later she started feeling poorly. This morning was having tachyarrhythmia when seen. Review of Systems: A comprehensive review of systems was negative.     Current Medications:   Current Facility-Administered Medications   Medication Dose Route Frequency    potassium chloride 20 mEq in 50 ml IVPB  20 mEq IntraVENous ONCE    magnesium sulfate 1 g/100 ml IVPB (premix or compounded)  1 g IntraVENous ONCE    lidocaine (PF) (XYLOCAINE) 100 mg/5 mL (2 %) injection syringe        [START ON 9/8/2021] epoetin amalia-epbx (RETACRIT) injection 20,000 Units  20,000 Units SubCUTAneous Q MON, WED & FRI    amiodarone (CORDARONE) 150 mg in dextrose 5% 100 mL bolus infusion  150 mg IntraVENous ONCE    amiodarone (CORDARONE) 375 mg/250 mL D5W infusion  0.5-1 mg/min IntraVENous TITRATE    0.9% sodium chloride infusion 250 mL  250 mL IntraVENous PRN    guaiFENesin ER (MUCINEX) tablet 600 mg  600 mg Oral Q12H    HYDROcodone-acetaminophen (NORCO) 5-325 mg per tablet 1 Tablet  1 Tablet Oral Q6H PRN    HYDROcodone-acetaminophen (NORCO) 5-325 mg per tablet 2 Tablet  2 Tablet Oral Q6H PRN    heparin (porcine) 1,000 unit/mL injection 1,900 Units  1,900 Units InterC[facility] DIALYSIS PRN    And    heparin (porcine) 1,000 unit/mL injection 1,900 Units  1,900 Units InterCATHeter DIALYSIS PRN    hydrALAZINE (APRESOLINE) 20 mg/mL injection 10 mg  10 mg IntraVENous Q4H PRN    fluconazole (DIFLUCAN) tablet 100 mg  100 mg Oral DAILY    sodium chloride (NS) flush 5-40 mL  5-40 mL IntraVENous Q8H    sodium chloride (NS) flush 5-40 mL  5-40 mL IntraVENous PRN    acetaminophen (TYLENOL) tablet 650 mg  650 mg Oral Q6H PRN    Or    acetaminophen (TYLENOL) suppository 650 mg  650 mg Rectal Q6H PRN    ondansetron (ZOFRAN ODT) tablet 4 mg  4 mg Oral Q8H PRN    Or    ondansetron (ZOFRAN) injection 4 mg  4 mg IntraVENous Q6H PRN    senna (SENOKOT) tablet 8.6 mg  1 Tablet Oral DAILY PRN    levETIRAcetam (KEPPRA) tablet 250 mg  250 mg Oral BID    PARoxetine (PAXIL) tablet 20 mg  20 mg Oral DAILY    Warfarin - pharamcy to dose   Other Rx Dosing/Monitoring    cephALEXin (KEFLEX) capsule 500 mg  500 mg Oral BID    hydrALAZINE (APRESOLINE) tablet 100 mg  100 mg Oral TID    tafamidis cap 61 mg (Patient Supplied)  61 mg Oral DAILY      Allergies   Allergen Reactions    Atarax [Hydroxyzine Hcl] Other (comments)     Small doses tolerated. Full dose 10mg causes oversedation.      Benzodiazepines Other (comments)     Respiratory issues       Objective:  Vitals:    Vitals:    09/07/21 0800 09/07/21 0954 09/07/21 1000 09/07/21 1001   BP:  111/72     Pulse: (!) 134 (!) 137 (!) 136 (!) 136   Resp:  (!) 32 30 (!) 32   Temp:  (!) 102.8 °F (39.3 °C)     TempSrc:       SpO2:  (!) 79%     Weight:       Height:         Intake and Output:  No intake/output data recorded. 09/05 1901 - 09/07 0700  In: 1800.8 [P.O.:480]  Out: 150 [Urine:150]    Physical Examination:    General: Obese   Neck:  Supple, no mass  Resp:  resp distress, wheezing   CV:  VAD sounds,  no murmur or rub, no LE edema  GI:  Soft, NT, + Bowel sounds, no hepatosplenomegaly  Neurologic:  Non focal  :  Fletcher +    []    High complexity decision making was performed  []    Patient is at high-risk of decompensation with multiple organ involvement    Lab Data Personally Reviewed: I have reviewed all the pertinent labs, microbiology data and radiology studies during assessment. Recent Labs     09/07/21 0051 09/06/21 0457 09/05/21 0541 09/04/21  1710    135* 137 138   K 3.7 4.1 4.4 4.5    103 107 106   CO2 26 23 20* 20*   GLU 81 73 89 86   BUN 25* 52* 98* 103*   CREA 3.22* 4.98* 8.00* 7.91*   CA 7.5* 7.2* 6.8* 6.7*   MG 1.9  --   --   --    ALB 1.6* 1.5* 1.6* 1.5*   ALT 7* 7* <6* <6*   INR 3.8* 3.8* 3.6* 3.3*     Recent Labs     09/07/21 0051 09/06/21 0457 09/05/21 0541 09/04/21  1710   WBC 9.0 5.4 3.0* 2.7*   HGB 8.0* 6.8* 7.4* 6.5*   HCT 24.5* 21.4* 23.0* 20.2*   * 114* 123* 117*     No results found for: SDES  Lab Results   Component Value Date/Time    Culture result: NO GROWTH 4 DAYS 09/03/2021 07:16 PM    Culture result: CANDIDA ALBICANS (A) 05/13/2021 11:29 AM    Culture result: RARE DIPHTHEROIDS (A) 05/12/2021 04:29 AM    Culture result: LIGHT YEAST, (APPARENT CANDIDA ALBICANS) (A) 05/11/2021 07:09 PM    Culture result: LIGHT NORMAL RESPIRATORY BETO 05/11/2021 07:09 PM     Recent Results (from the past 24 hour(s))   RBC, ALLOCATE    Collection Time: 09/06/21 11:45 AM   Result Value Ref Range    HISTORY CHECKED?  Historical check performed    TYPE & SCREEN    Collection Time: 09/06/21 12:23 PM   Result Value Ref Range    Crossmatch Expiration 09/09/2021,8145     ABO/Rh(D) O NEGATIVE     Antibody screen POS     Antibody ID PENDING     ELSIE Poly POS     ELSIE IgG POS     Comment       PREVIOUSLY IDENTIFIED ANTI JKB, ANTI C, ANTI E, ANTI D    ELSIE C3b/C3d NEG    LD    Collection Time: 09/07/21 12:51 AM   Result Value Ref Range     (H) 81 - 431 U/L   METABOLIC PANEL, COMPREHENSIVE    Collection Time: 09/07/21 12:51 AM   Result Value Ref Range    Sodium 137 136 - 145 mmol/L    Potassium 3.7 3.5 - 5.1 mmol/L    Chloride 103 97 - 108 mmol/L    CO2 26 21 - 32 mmol/L    Anion gap 8 5 - 15 mmol/L    Glucose 81 65 - 100 mg/dL    BUN 25 (H) 6 - 20 MG/DL    Creatinine 3.22 (H) 0.55 - 1.02 MG/DL    BUN/Creatinine ratio 8 (L) 12 - 20      GFR est AA 17 (L) >60 ml/min/1.73m2    GFR est non-AA 14 (L) >60 ml/min/1.73m2    Calcium 7.5 (L) 8.5 - 10.1 MG/DL    Bilirubin, total 0.7 0.2 - 1.0 MG/DL    ALT (SGPT) 7 (L) 12 - 78 U/L    AST (SGOT) 26 15 - 37 U/L    Alk.  phosphatase 80 45 - 117 U/L    Protein, total 6.9 6.4 - 8.2 g/dL    Albumin 1.6 (L) 3.5 - 5.0 g/dL    Globulin 5.3 (H) 2.0 - 4.0 g/dL    A-G Ratio 0.3 (L) 1.1 - 2.2     NT-PRO BNP    Collection Time: 09/07/21 12:51 AM   Result Value Ref Range    NT pro-BNP >35,000 (H) <125 PG/ML   PROTHROMBIN TIME + INR    Collection Time: 09/07/21 12:51 AM   Result Value Ref Range    INR 3.8 (H) 0.9 - 1.1      Prothrombin time 37.6 (H) 9.0 - 11.1 sec   CBC W/O DIFF    Collection Time: 09/07/21 12:51 AM   Result Value Ref Range    WBC 9.0 3.6 - 11.0 K/uL    RBC 2.64 (L) 3.80 - 5.20 M/uL    HGB 8.0 (L) 11.5 - 16.0 g/dL    HCT 24.5 (L) 35.0 - 47.0 %    MCV 92.8 80.0 - 99.0 FL    MCH 30.3 26.0 - 34.0 PG    MCHC 32.7 30.0 - 36.5 g/dL    RDW 17.2 (H) 11.5 - 14.5 %    PLATELET 147 (L) 387 - 400 K/uL    MPV 9.0 8.9 - 12.9 FL    NRBC 0.0 0  WBC    ABSOLUTE NRBC 0.00 0.00 - 0.01 K/uL   MAGNESIUM    Collection Time: 09/07/21 12:51 AM   Result Value Ref Range    Magnesium 1.9 1.6 - 2.4 mg/dL   COVID-19 RAPID TEST    Collection Time: 09/07/21  9:03 AM   Result Value Ref Range    Specimen source Nasopharyngeal      COVID-19 rapid test Not detected NOTD             Total time spent with patient:  xxx   min. Care Plan discussed with:  Patient     Family      RN      Consulting Physician [address] [facility]        I have reviewed the flowsheets. Chart and Pertinent Notes have been reviewed. No change in PMH ,family and social history from Consult note.       Madison Parry MD Primary Defect Length In Cm (Final Defect Size - Required For Flaps/Grafts): 1.9

## 2022-09-30 NOTE — PROGRESS NOTES
1145: Bedside and Verbal shift change report given to [address] (oncoming nurse) by Gavin Reed (offgoing nurse). Report included the following information SBAR, Kardex, OR Summary, Procedure Summary, Intake/Output, MAR, Recent Results, Cardiac Rhythm Paced and Alarm Parameters . 1230: Janel from [address] at bedside to deactivate AICD. 1255: Patient's quad lumen catheter removed. 1545: I have reviewed discharge instructions with the patient and caregiver. The patient and caregiver verbalized understanding. Patient discharged home with Hospice with [facility]. All questions answered. [Alert] : alert [Well Nourished] : well nourished [No Acute Distress] : no acute distress [Well Developed] : well developed [Normal Sclera/Conjunctiva] : normal sclera/conjunctiva [EOMI] : extra ocular movement intact [No Proptosis] : no proptosis [Normal Oropharynx] : the oropharynx was normal [Thyroid Not Enlarged] : the thyroid was not enlarged [No Thyroid Nodules] : no palpable thyroid nodules [No Respiratory Distress] : no respiratory distress [No Accessory Muscle Use] : no accessory muscle use [Clear to Auscultation] : lungs were clear to auscultation bilaterally [Normal S1, S2] : normal S1 and S2 [Normal Rate] : heart rate was normal [Regular Rhythm] : with a regular rhythm [No Edema] : no peripheral edema [Pedal Pulses Normal] : the pedal pulses are present [Normal Bowel Sounds] : normal bowel sounds [Not Tender] : non-tender [Not Distended] : not distended [Soft] : abdomen soft [Normal Anterior Cervical Nodes] : no anterior cervical lymphadenopathy [No Spinal Tenderness] : no spinal tenderness [Spine Straight] : spine straight [No Stigmata of Cushings Syndrome] : no stigmata of Cushings Syndrome [Normal Gait] : normal gait [Normal Strength/Tone] : muscle strength and tone were normal [No Rash] : no rash [Acanthosis Nigricans] : no acanthosis nigricans [Normal Reflexes] : deep tendon reflexes were 2+ and symmetric [No Tremors] : no tremors [Oriented x3] : oriented to person, place, and time

## 2023-12-02 NOTE — PROGRESS NOTES
[facility] VAD/Heart Failure Progress Note Admit Date: 2020 POD:  * No surgery found * Procedure:      
 
 Subjective:  
Feels better; abx's Objective:  
Vitals: 
Pulse 95, temperature 98.2 °F (36.8 °C), resp. rate 18, height 5' 2\" (1.575 m), weight 282 lb 3 oz (128 kg), SpO2 98 %. Temp (24hrs), Av.2 °F (36.8 °C), Min:98.1 °F (36.7 °C), Max:98.2 °F (36.8 °C) Hemodynamics: 
 CO:   
 CI:   
 CVP:   
 SVR:   
 PAP Systolic:   
 PAP Diastolic:   
 PVR:   
 JK34:   
 SCV02:   
 
VAD Interrogation: LVAD (Heartmate) Pump Speed (RPM): 9400 Pump Flow (LPM): 4.5 PI (Pulsitility Index): 4.6 Power: 5.8 MAP: 100  Test: Yes 
Back Up  at Bedside & Labeled: Yes Power Module Test: Yes Driveline Site Care: No 
Driveline Dressing: Clean, Dry, and Intact EKG/Rhythm:   
 
Extubation Date / Time:  
 
CT Output:  
 
Ventilator: 
  
 
Oxygen Therapy: 
Oxygen Therapy O2 Sat (%): 98 % (20 0337) Pulse via Oximetry: 81 beats per minute (20 1550) O2 Device: Room air (20 0836) O2 Flow Rate (L/min): 2 l/min (20 0848) CXR: 
 
Admission Weight: Last Weight Weight: 297 lb 8 oz (134.9 kg) Weight: 282 lb 3 oz (128 kg) Intake / Output / Drain: 
Current Shift: 701 -  1900 In: 640 [P.O.:640] Out: - Last 24 hrs.:  
 
Intake/Output Summary (Last 24 hours) at 2020 1621 Last data filed at 2020 1200 Gross per 24 hour Intake 1084 ml Output 300 ml Net 784 ml No results for input(s): CPK, CKMB, TROIQ in the last 72 hours. Recent Labs  
  20 
0547 20 
0520 20 
0523 20 
0515  141  --  140  
K 4.4 4.7  --  4.8  
CO2 27 27  --  27  
BUN 53* 52*  --  51* CREA 2.60* 2.57*  --  2.58* * 118*  --  121* PHOS 4.6  --   --   --   
MG 2.0 1.9 2.0  --   
WBC 5.1 6.2 6.3  --   
HGB 7.9* 8.4* 8.3*  --   
HCT 25.4* 28.0* 27.7*  --   
* 178 167  --   
 
 Recent Labs  
  11/20/20 
0547 11/19/20 
0520 11/18/20 
0515 INR 3.0* 2.7* 2.6* PTP 29.9* 27.4* 25.8* No lab exists for component: PBNP Current Facility-Administered Medications:  
  bumetanide (BUMEX) tablet 1 mg, 1 mg, Oral, DAILY, Jaspreet Mendoza MD, 1 mg at 11/20/20 8121   hydrALAZINE (APRESOLINE) 20 mg/mL injection 10 mg, 10 mg, IntraVENous, Q6H PRN, Triny Limon NP, 10 mg at 11/18/20 1753   hydrALAZINE (APRESOLINE) tablet 25 mg, 25 mg, Oral, TID, Adán Collazo NP, 25 mg at 11/20/20 5502   Warfarin - Pharmacy Dosing, , Other, Rx Dosing/Monitoring, Swati Huerta MD 
  PARoxetine (PAXIL) tablet 20 mg, 20 mg, Oral, DAILY, Albert Akhtar DO, 20 mg at 11/20/20 0846 
  albuterol-ipratropium (DUO-NEB) 2.5 MG-0.5 MG/3 ML, 3 mL, Nebulization, Q6H PRN, Mike Roberson MD, 3 mL at 11/14/20 1549   cephALEXin (KEFLEX) capsule 250 mg, 250 mg, Oral, BID, Noah Spaulding MD, 250 mg at 11/20/20 0846 
  ranolazine ER (RANEXA) tablet 1,000 mg, 1,000 mg, Oral, BID, Nikko Btaes MD, 1,000 mg at 11/20/20 1002   ferrous sulfate tablet 325 mg, 325 mg, Oral, DAILY WITH BREAKFAST, Britta KATE DO, 325 mg at 11/20/20 0846 
  docusate sodium (COLACE) capsule 100 mg, 100 mg, Oral, BID, Britta KATE DO, 100 mg at 11/20/20 4013   hydrOXYzine HCL (ATARAX) tablet 10 mg, 10 mg, Oral, TID PRN, CHARLENE Callahan M,  
  glucose chewable tablet 16 g, 4 Tab, Oral, PRN, Britta KATE DO 
  glucagon (GLUCAGEN) injection 1 mg, 1 mg, IntraMUSCular, PRN, Britta KATE,  
  dextrose 10% infusion 0-250 mL, 0-250 mL, IntraVENous, PRN, CHARLENE Callahan, DO 
  insulin lispro (HUMALOG) injection, , SubCUTAneous, AC&HS, Britta KATE DO, 2 Units at 11/20/20 1303   sodium chloride (NS) flush 5-40 mL, 5-40 mL, IntraVENous, Q8H, Nilson Mina MD, 10 mL at 11/20/20 3080   sodium chloride (NS) flush 5-40 mL, 5-40 mL, IntraVENous, PRN, Nilson Mina MD 
   acetaminophen (TYLENOL) tablet 650 mg, 650 mg, Oral, Q6H PRN, 650 mg at 11/19/20 1526 **OR** acetaminophen (TYLENOL) suppository 650 mg, 650 mg, Rectal, Q6H PRN, Ranjan Crook MD 
  polyethylene glycol (MIRALAX) packet 17 g, 17 g, Oral, DAILY PRN, Ranjan Crook MD 
  promethazine (PHENERGAN) tablet 12.5 mg, 12.5 mg, Oral, Q6H PRN **OR** ondansetron (ZOFRAN) injection 4 mg, 4 mg, IntraVENous, Q6H PRN, Ranjan Crook MD 
 
A/P 
  
S/P LVAD - good flows Need for anticoagulation - coumadin Sternal wound infecion - abx's Bite - abx's  
  
Risk of morbidity and mortality - high Medical decision making - high complexity Signed By: Mary Salmeron MD 
 
 
 No

## 2024-03-10 NOTE — PROGRESS NOTES
0137: pt intubated at this time by Derik Medina MD. Pt bagged with 100% oxygen by ambu bag, noticed pt vomiting a few seconds after being placed on bipap. Pt has 7.5 tube, 22@ lip, tube verified by cmac, xray, BBS, +color change. Placed immediately on ventilator 18/450/+10/100%. Settings documented in flowsheet    6496: ABG completed, results uploaded, shown to Saurabh Freedman MD, and vent settings adjusted in flowsheet    8027: Found pt on Vmax of 60 and PEEP to +8, changes made by Dr Rubin Ready: pt off to CT at this time, transported on vent and monitor with RN and this RT    0536: pt returned from CT, hooked immediately back up to room vent. Will continue to monitor. Refer to the Assessment tab to view/cancel completed assessment.

## 2024-04-03 NOTE — PROGRESS NOTES
Aniya GRIFFINR will call patient, paperwork at  he is to have completed by PCP as he was in hospital in [place] for TIA, no cardiac issues, also other forms he brought were for patient to fill out not physician.    Problem: Self Care Deficits Care Plan (Adult) Goal: *Acute Goals and Plan of Care (Insert Text) Description: FUNCTIONAL STATUS PRIOR TO ADMISSION: Patient was modified independent using a rollator for functional mobility. Patient with recent hospital admission with discharge home however returned s/p GLF with left leg wound. HOME SUPPORT: The patient lived alone with daughter to provide assistance occasionally, however daughter is caring for her baby and is looking into hiring caregivers for patient per  note. Occupational Therapy Goals Initiated 11/13/2020 1. Patient will perform ADLs standing 5 mins without fatigue or LOB with contact guard assistance within 5 day(s). 2.  Patient will perform lower body ADLs with supervision/set-up within 5 day(s) using AE PRN. 3.  Patient will perform sponge bathing x supervision/set-up within 5 day(s). 4.  Patient will perform toilet transfers with contact guard assistance within 5 day(s). 5.  Patient will perform all aspects of toileting with contact guard assistance within 7 day(s). 6.  Patient will complete LVAD switchover from wall <> portable battery pack with independence within 7 day(s) Outcome: Progressing Towards Goal 
 OCCUPATIONAL THERAPY TREATMENT Patient: Abdirashid Gutierrez (32 y.o. female) Date: 11/16/2020 Diagnosis: Anemia [D64.9] ROCIO (acute kidney injury) ([facility] [address] [N17.9] <principal problem not specified> Precautions: Fall Chart, occupational therapy assessment, plan of care, and goals were reviewed. ASSESSMENT Patient continues with skilled OT services and is progressing towards goals. Patient continues to be limited by spontaneous jerky/tremulous movements of BUE and BLE as well as with tachycardia with episodes of VTAC in session ranging from 100-109 bpm. Patient also continues to have difficulty with LVAD switchovers requiring heavy verbal cues for sequencing and problem-solving through tasks.  Noted patient's rollator brakes are broken so utilized inpatient rolling richter for increased safety. Patient's daughter present and stating she would purchase patient a new rollator. Patient's daughter also noting that all involuntary movements are new and that her mother's cognition is not at baseline level, with patient appearing confused and requiring cueing for basic tasks. Patient A&Ox 2 for OT today (knew name and location only); asked patient twice about date and reason for hospitalization however patient with poor recall of previously asked question after 5 minutes. Highly recommend inpatient rehab to increase patient safety, balance, and coordination required for participation with ADL transfers and tasks. Current Level of Function Impacting Discharge (ADLs): MIN A x 2 for toilet transfers > bedside commode (inability to walk safely long distances); MIN A for LB dressing and bathing tasks Other factors to consider for discharge: cognition (A&Ox2 today); decreased safety awareness; elevated HR (Afib and tachycardia); patient with limited insight into deficits; high fall risk PLAN : 
Patient continues to benefit from skilled intervention to address the above impairments. Continue treatment per established plan of care. to address goals. Recommend with staff: AX2 to bedside commode using gait belt; daily practice with LVAD switchovers; reorient patient to date and reason for hospitalization Recommend next OT session: functional mobility to bathroom for grooming tasks Recommendation for discharge: (in order for the patient to meet his/her long term goals) Therapy 3 hours per day 5-7 days per week This discharge recommendation: 
Has been made in collaboration with the attending provider and/or case management IF patient discharges home will need the following DME: walker: rollator SUBJECTIVE:  
Patient stated I think it is Tuesday (OT told patient it was Monday 5 minutes prior).  OBJECTIVE DATA SUMMARY:  
Cognitive/Behavioral Status: 
Neurologic State: Alert Orientation Level: Oriented to person;Oriented to place; Disoriented to situation;Disoriented to time Cognition: Poor safety awareness;Decreased attention/concentration Perception: Appears intact Perseveration: Perseverates during ADLS Safety/Judgement: Decreased awareness of need for safety;Decreased insight into deficits; Decreased awareness of need for assistance Functional Mobility and Transfers for ADLs: 
Bed Mobility: 
Supine to Sit: Contact guard assistance Scooting: Contact guard assistance;Assist x1;Additional time Transfers: 
Sit to Stand: Minimum assistance;Assist x2; Additional time; Adaptive equipment Functional Transfers Toilet Transfer : (patient using brief and purewick at this time) Bed to Chair: Minimum assistance;Assist x2; Additional time; Adaptive equipment Balance: 
Sitting: Impaired; Without support Sitting - Static: Fair (occasional) Sitting - Dynamic: Fair (occasional)(high guard) Standing: Impaired; With support Standing - Static: Fair;Constant support Standing - Dynamic : Fair;Constant support ADL Intervention: 
  
Patient completed UB and LB dressing/bathing tasks seated EOB with high guard provided for dynamic sitting and standing balance x MANUEL. Patient also with some impulsivity standing without attending to  or driveline and requiring verbal cues from OT for safety using walker. Patient also required heavy verbal cues for sequencing through LVAD switchover today and perseverating on unscrewing power leads. Patient also with decreased dexterity of bilateral hands requiring increased time for management of LVAD equipment. Upper Body Bathing Bathing Assistance: Contact guard assistance(seated EOB) Cues: Physical assistance;Verbal cues provided Lower Body Bathing Bathing Assistance: Minimum assistance Perineal  : Minimum assistance; Compensatory technique training Position Performed: Standing(using walker for support) Lower Body : Minimum assistance(seated with A for balance when leaning forward to wash BLE) Upper Body Dressing Assistance Dressing Assistance: Minimum assistance(heavy cueing for sequencing and for A with batteries) Lower Body Dressing Assistance Pants With Elastic Waist: Minimum assistance(A for balance when pulling briefs up/over hips/reacher) Cognitive Retraining Safety/Judgement: Decreased awareness of need for safety;Decreased insight into deficits; Decreased awareness of need for assistance Activity Tolerance:  
Fair, requires frequent rest breaks, observed SOB with activity, and episodes of VTAC in session After treatment patient left in no apparent distress:  
Sitting in chair, Call bell within reach, Caregiver / family present, and legs elevated COMMUNICATION/COLLABORATION:  
The patients plan of care was discussed with: Physical therapist and PhysicianLacie Pelaez Time Calculation: 47 mins

## 2024-06-11 NOTE — PROGRESS NOTES
[address] [facility] [address] [facility] in [place], [place] Inpatient Consult Progress Note Patient name: Gabriel Connor Patient : 1950 Patient MRN: [id number] Consulting MD: Jewels Dillard MD 
Date of service: 21 CHIEF COMPLAINT: 
Dyspnea 
  
PLAN: 
· Nephrology consult for worsening renal function; continue Bumex for now, RHC requested · ID consult for pancytopenia and persistent sed rate · Hematology consult for pancytopenia Continue increased device speed to 9600rpm due to LVEDD up to 8.3cm TTE to reassess dimensions Previously was intolerant to higher speeds due to VT; will observe Cardiology consult for [address] Intolerant to BB/ACEi/ARB/ARNI due to aTTR; on hold for now Bumex per Dr. Teresa Montejo; appreciate assistance   
Continue tafamidis 61 mg PO daily Hydralazine 100 mg po TID, MAP above goal 70-90 mmHg in absence of palpable radial pulse   Begin Imdur 30 mg PO daily Intolerant of spironolactone due to hyperkalemia  
Coumadin dose per pharmacy; goal INR lowered to 1.8-2.5 due to GIB Continue ranolazine, no ectopy - followed by Dr. Dk Mann OP Antibiotics for chronic sternal wound infection per ID- continue Keflex po Check FOB due to anemia TSH 3.99; begin Synthroid 25 mcg qAM  
Use home Trilogy when napping and QHS PFTs when euvolemic  
SLP consult due to dysphagia appreciated WOCN consult for BLE wounds PT/OT eval 
US of Lar Palliative care consult appreciated; patient remains full code, AMD given to patient/daughter to complete DM management per Hospitalist  
Up to date on flu, PNA, and COVID vaccinations CM to assist with dispo planning; patient has [place] and home care aide already set up  
  
IMPRESSION: 
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 Parvez Cartwright) · C/b delayed extubation due to severe COPD 
· C/b critical illness polyneuropathy · C/b prolonged hospitalization post-LVAD, 55 days · C/b sternal wound infection s/p debridement (by Dr. Nguyen Rajan) s/p wound vac · C/b sacral ulcer · Would culture positive for Staph aureus, not MRSA · C/b LVAD site drainage, improved S/p CRT-ICD · ICD fired due to electrolyte imbalance (2011) H/o breast cancer (1992) · s/p bilateral mastectomy/chemo and endometrial cancer s/p hysterectomy · Lymphedema of LLE due to cancer treatment Severe COPD with FEV1 50% Depression Atrial fibrillation H/o \"two mini strokes\" S/p fall with hip facture · Right hip hemmiarthroplasty (5/23/18) by Dr. Isabel Dandy) · S/p removed hip hardwarare due to pain (4/15/19) COPD severe CKD, stage 3 Hyperkalemia Pulmonary hypertension Cardiac risk factors: · Morbid obesity, Body mass index is 42.29 kg/m². · DM2 insulin dependent · JED on Trilogy · HL Urinary incontinence, severe · no procedures due to anticoagulation Endometrial cancer (2002) HTN 
HL 
  
CARDIAC IMAGING: 
Echo (9/24/19) LVEF 20-25%, AV opens 1:1, no AR Echo (5/29/18) LVEF 10-%, ramps study done, report in [facility] Echo (1/9/18) ramp study done, LVEDD 7.1cm C (11/9/18) 2 vessel disease with 90% OM, 80% PDA, DSA to PDA branch 
  INTERVAL HISTORY: 
MAPs elevated Bumex resumed Net (-) 510 mL Cr improved 3.03  
proBNP elevated 9464 from 7545 INR 1.8 LVAD INTERROGATION: 
Device interrogated in person Device function normal, normal flow, no events LVAD Pump Speed (RPM): 6857 Pump Flow (LPM): 5.3 MAP: 84 
PI (Pulsitility Index): 5.6 Power: 6.1  Test: No 
Back Up System Controller at Bedside & Labeled: Yes Power Module Test: Yes Driveline Site Care: No 
Driveline Dressing: Clean, Dry, and Intact Outpatient: No 
MAP in Therapeutic Range (Outpatient): Yes Testing Alarms Reviewed: Yes 
Back up SC speed: 9600 Back up Low Speed Limit: 9200 Emergency Equipment with Patient?: Yes Emergency procedures reviewed?: No 
Drive line site inspected?: No(site covered by dressing) Drive line intergrity inspected?: Yes Drive line dressing changed?: No 
 
PHYSICAL EXAM: 
Visit Vitals BP (!) 80/0 Pulse 81 Temp 97.9 °F (36.6 °C) Resp 27 Ht 5' 7\" (1.702 m) Wt 275 lb 9.2 oz (125 kg) SpO2 99% BMI 43.16 kg/m² General: Patient is well developed, well-nourished in no acute distress HEENT: Normocephalic and atraumatic. No scleral icterus. Pupils are equal, round and reactive to light and accomodation. No conjunctival injection. Oropharynx is clear. Neck: Supple. No evidence of thyroid enlargements or lymphadenopathy. JVD: Cannot be appreciated Lungs: Breath sounds are equal and clear bilaterally. No wheezes, rhonchi, or rales. Heart: Regular rate and rhythm with normal S1 and S2. No murmurs, gallops or rubs. Abdomen: Soft, no mass or tenderness. No organomegaly or hernia. Bowel sounds present. Genitourinary and rectal: deferred Extremities: No cyanosis, clubbing, or edema. Neurologic: No focal sensory or motor deficits are noted. Grossly intact. Psychiatric: Awake, alert an doriented x 3. Appropriate mood and affect. Skin: Warm, dry and well perfused. No lesions, nodules or rashes are noted. REVIEW OF SYSTEMS: 
General: Denies fever, night sweats. Ear, nose and throat: Denies difficulty hearing, sinus problems, runny nose, post-nasal drip, ringing in ears, mouth sores, loose teeth, ear pain, nosebleeds, sore throate, facial pain or numbess Cardiovascular: see above in the interval history Respiratory: Denies cough, wheezing, sputum production, hemoptysis. Gastrointestinal: Denies heartburn, constipation, intolerance to certain foods, diarrhea, abdominal pain, nausea, vomiting, difficulty swallowing, blood in stool Kidney and bladder: Denies painful urination, frequent urination, urgency, prostate problems and impotence Musculoskeletal: Denies joint pain, muscle weakness Skin and hair: Denies change in existing skin lesions, hair loss or increase, breast changes PAST MEDICAL HISTORY: 
Past Medical History:  
Diagnosis Date  Asthma  Cancer ([facility] breast  
 Cancer ([facility]   
 endometrial  
 Congestive heart failure, unspecified  CRI (chronic renal insufficiency)  Depression  Diabetes ([facility]  Hypertension PAST SURGICAL HISTORY: 
Past Surgical History:  
Procedure Laterality Date  HX HERNIA REPAIR    
 HX HYSTERECTOMY  HX MASTECTOMY FAMILY HISTORY: 
No family history on file. SOCIAL HISTORY: 
Social History Socioeconomic History  Marital status:  Spouse name: Not on file  Number of children: Not on file  Years of education: Not on file  Highest education level: Not on file Tobacco Use  Smoking status: Never Smoker  Smokeless tobacco: Never Used Substance and Sexual Activity  Alcohol use: Not Currently LABORATORY RESULTS: 
  
Labs Latest Ref Rng & Units 4/19/2021 4/18/2021 4/17/2021 4/5/2021 4/2/2021 WBC 3.6 - 11.0 K/uL 3. 0(L) 3. 2(L) 2. 7(L) - 4.9  
RBC 3.80 - 5.20 M/uL 2.89(L) 3.02(L) 2.94(L) - 3.45(L) Hemoglobin 11.5 - 16.0 g/dL 7. 6(L) 7. 8(L) 7. 7(L) - 9. 0(L) Hematocrit 35.0 - 47.0 % 25. 5(L) 26. 8(L) 26. 3(L) - 28. 7(L) MCV 80.0 - 99.0 FL 88.2 88.7 89.5 - 83 Platelets 141 - 429 K/uL 122(L) 129(L) 128(L) - 181 Lymphocytes 12 - 49 % - 13 - - - Monocytes 5 - 13 % - 8 - - - Eosinophils 0 - 7 % - 0 - - - Basophils 0 - 1 % - 0 - - - Albumin 3.5 - 5.0 g/dL 3.6 3.6 3.6 4.0 4.2 Calcium 8.5 - 10.1 MG/DL 8.9 9.2 8.7 8.9 9.5 Glucose 65 - 100 mg/dL 125(H) 147(H) 130(H) 179(H) 155(H) BUN 6 - 20 MG/DL 43(H) 44(H) 42(H) 35(H) 43(H) Creatinine 0.55 - 1.02 MG/DL 3.02(H) 3.13(H) 2.64(H) 2.44(H) 2.89(H) Sodium 136 - 145 mmol/L 137 136 139 140 140 Potassium 3.5 - 5.1 mmol/L 4.0 4.7 4.6 4.9 4.9 TSH 0.36 - 3.74 uIU/mL - - 3.99(H) - -  
LDH 81 - 246 U/L 169 173 179 - 208 Some recent data might be hidden Lab Results Component Value Date/Time TSH 3.99 (H) 04/17/2021 04:54 AM  
 TSH 2.980 10/01/2020 12:00 AM  
 
 
ALLERGY: 
Allergies Allergen Reactions  Benzodiazepines Other (comments) Respiratory issues CURRENT MEDICATIONS: 
 
Current Facility-Administered Medications:  
  bumetanide (BUMEX) injection 2 mg, 2 mg, IntraVENous, BID, Donald Ken MD, 2 mg at 04/19/21 1880   [START ON 4/20/2021] levothyroxine (SYNTHROID) tablet 25 mcg, 25 mcg, Oral, 6am, Jade Walter, NP 
  isosorbide mononitrate ER (IMDUR) tablet 30 mg, 30 mg, Oral, DAILY, PolliardJadeuser, NP 
  cephALEXin (KEFLEX) capsule 250 mg, 250 mg, Oral, BID, Darlin Villalba MD, 250 mg at 04/19/21 0949 
  sodium chloride (NS) flush 5-40 mL, 5-40 mL, IntraVENous, Q8H, PollDana torres T, NP, 40 mL at 04/19/21 8234   sodium chloride (NS) flush 5-40 mL, 5-40 mL, IntraVENous, PRN, PolliardGucci T NP, 20 mL at 04/17/21 6739   acetaminophen (TYLENOL) tablet 650 mg, 650 mg, Oral, Q4H PRN, PollmarisoldJade NP, 650 mg at 04/18/21 9449   hydrALAZINE (APRESOLINE) 20 mg/mL injection 10 mg, 10 mg, IntraVENous, Q4H PRN, Jade Walter NP 
  albuterol (PROVENTIL VENTOLIN) nebulizer solution 2.5 mg, 2.5 mg, Nebulization, Q4H PRN, Jade Walter NP 
  arformoterol 15 mcg/budesonide 0.5 mg neb solution, , Nebulization, BID RT, Jade Walter NP, Given at 04/19/21 0154   hydrALAZINE (APRESOLINE) tablet 100 mg, 100 mg, Oral, TID, Gucci Walter NP, 100 mg at 04/19/21 0581   hydrOXYzine HCL (ATARAX) tablet 10 mg, 10 mg, Oral, TID PRN, Jade Walter NP 
Rock Samples insulin glargine (LANTUS) injection 20 Units, 20 Units, SubCUTAneous, BID, Luann Walter NP, 20 Units at 04/19/21 7033   PARoxetine (PAXIL) tablet 20 mg, 20 mg, Oral, DAILY, Judy Walter NP, 20 mg at 04/19/21 0949 
  ranolazine ER (RANEXA) tablet 1,000 mg, 1,000 mg, Oral, BID, Luann Walter NP, 1,000 mg at 04/19/21 5785   tafamidis cap 61 mg (Patient Supplied), 61 mg, Oral, DAILY, Judy Walter NP, 61 mg at 04/19/21 1005   warfarin dosing per pharmacy, , Other, Rx Dosing/Monitoring, Luann Walter NP 
  miconazole (MICOTIN) 2 % powder, , Topical, BID, Shella Barthel, MD, Given at 04/19/21 8116 PATIENT CARE TEAM: 
Patient Care Team: 
Toy Alpers, NP as PCP - General (Nurse Practitioner) Mary Brown MD (Cardiology) Gio Newsome MD as Physician (Cardiology) Thank you for allowing me to participate in this patient's care. TEA Lauren [address] [place] [address], [address] Phone: [phone] Fax: [phone] ambulatory

## 2025-04-28 NOTE — TELEPHONE ENCOUNTER
Telephone Call RE:  Lab Reminder      Outcome:     [x] Patient verbalizes understanding    [] Unable to reach   [] Left message              []     Requesting patient check her INR in the AM prior to her appointment with our office      Vivek Adame
pmd
5

## (undated) DEVICE — PACK PROCEDURE SURG HRT CATH

## (undated) DEVICE — PRESSURE MONITORING SET: Brand: TRUWAVE

## (undated) DEVICE — DRAPE PRB US TRNSDCR 6X96IN --

## (undated) DEVICE — 6FR THERMODILUTION BALLOON CATHETER SWAN (ARROW)

## (undated) DEVICE — CO-SET DELIVERY SYSTEM FOR 123 ROOM TEMPATURE INJECTATE: Brand: CO-SET+

## (undated) DEVICE — WASTE KIT - ST MARY: Brand: MEDLINE INDUSTRIES, INC.

## (undated) DEVICE — ANGIOGRAPHY KIT

## (undated) DEVICE — SPECIAL PROCEDURE DRAPE 32" X 34": Brand: SPECIAL PROCEDURE DRAPE

## (undated) DEVICE — GLIDESHEATH SLENDER STAINLESS STEEL KIT: Brand: GLIDESHEATH SLENDER

## (undated) DEVICE — GUIDEWIRE VASC L150CM DIA0.025IN TIP L7CM J RAD 3MM PTFE

## (undated) DEVICE — Device

## (undated) DEVICE — KIT MFLD ISOLATN NACL CNTRST PRT TBNG SPIK W/ PRSS TRNSDUC

## (undated) DEVICE — KIT MED IMAG CNTRST AGNT W/ IOPAMIDOL REUSE